# Patient Record
Sex: FEMALE | Race: WHITE | NOT HISPANIC OR LATINO | Employment: OTHER | URBAN - METROPOLITAN AREA
[De-identification: names, ages, dates, MRNs, and addresses within clinical notes are randomized per-mention and may not be internally consistent; named-entity substitution may affect disease eponyms.]

---

## 2017-01-11 ENCOUNTER — NURSE TRIAGE (OUTPATIENT)
Dept: ADMINISTRATIVE | Facility: CLINIC | Age: 62
End: 2017-01-11

## 2017-01-12 NOTE — TELEPHONE ENCOUNTER
Pt reports she has blockages in her carotid arteries: today at 1500 she was raising her arms to hang a curtain and had a sudden sharp pain in chest, brief, she belched and pain resolved. She then developed a sudden, sharp, brief pain in her forehead . She then had some dizziness and feels tired. Reported she has had intermittent dizziness since last week. Also reports a wet, productive cough, nose is dry. Afebrile. No sx's other than feeling tired at this time. She is concerned something broke off in her arteries and caused her sx's.   Reason for Disposition   Nursing judgment    Protocols used: ST NO GUIDELINE OR REFERENCE [id number]    Advised to f/u with her dr for her concerns. Call if any changes.

## 2017-01-16 ENCOUNTER — TELEPHONE (OUTPATIENT)
Dept: INTERNAL MEDICINE | Facility: CLINIC | Age: 62
End: 2017-01-16

## 2017-01-16 NOTE — TELEPHONE ENCOUNTER
----- Message from Jaja Santiago sent at 1/16/2017  7:51 AM CST -----  Contact: call  [phone]  Pt is asking for the nurse to call her,  She would not give any other information just that you know her and know why she is calling

## 2017-01-16 NOTE — TELEPHONE ENCOUNTER
Spoke with pt, she states she is been having dizzy spells offer pt an appt she stated she can come Wednesday or Thursday schedule pt an appt for this Wednesday 1/18/2017.

## 2017-01-16 NOTE — TELEPHONE ENCOUNTER
----- Message from Neisha Martinez sent at 1/16/2017 11:23 AM CST -----  Contact: self/[phone]  Pt called in regards to speaking to the nurse about her appointment that was scheduled.        Please advise

## 2017-01-19 ENCOUNTER — OFFICE VISIT (OUTPATIENT)
Dept: INTERNAL MEDICINE | Facility: CLINIC | Age: 62
End: 2017-01-19
Payer: COMMERCIAL

## 2017-01-19 VITALS
DIASTOLIC BLOOD PRESSURE: 72 MMHG | TEMPERATURE: 98 F | HEART RATE: 70 BPM | SYSTOLIC BLOOD PRESSURE: 110 MMHG | WEIGHT: 108.94 LBS | OXYGEN SATURATION: 98 % | HEIGHT: 62 IN | BODY MASS INDEX: 20.05 KG/M2

## 2017-01-19 DIAGNOSIS — I10 ESSENTIAL HYPERTENSION: Primary | ICD-10-CM

## 2017-01-19 DIAGNOSIS — R42 LIGHTHEADEDNESS: ICD-10-CM

## 2017-01-19 DIAGNOSIS — Z87.448 HISTORY OF HEMATURIA: ICD-10-CM

## 2017-01-19 LAB
BILIRUB UR QL STRIP: NEGATIVE
CLARITY UR REFRACT.AUTO: CLEAR
COLOR UR AUTO: NORMAL
GLUCOSE UR QL STRIP: NEGATIVE
HGB UR QL STRIP: NEGATIVE
KETONES UR QL STRIP: NEGATIVE
LEUKOCYTE ESTERASE UR QL STRIP: NEGATIVE
NITRITE UR QL STRIP: NEGATIVE
PH UR STRIP: 7 [PH] (ref 5–8)
PROT UR QL STRIP: NEGATIVE
SP GR UR STRIP: 1 (ref 1–1.03)
URN SPEC COLLECT METH UR: NORMAL
UROBILINOGEN UR STRIP-ACNC: NEGATIVE EU/DL

## 2017-01-19 PROCEDURE — 1159F MED LIST DOCD IN RCRD: CPT | Mod: S$GLB,,, | Performed by: INTERNAL MEDICINE

## 2017-01-19 PROCEDURE — 3078F DIAST BP <80 MM HG: CPT | Mod: S$GLB,,, | Performed by: INTERNAL MEDICINE

## 2017-01-19 PROCEDURE — 99999 PR PBB SHADOW E&M-EST. PATIENT-LVL IV: CPT | Mod: PBBFAC,,, | Performed by: INTERNAL MEDICINE

## 2017-01-19 PROCEDURE — 81003 URINALYSIS AUTO W/O SCOPE: CPT

## 2017-01-19 PROCEDURE — 3074F SYST BP LT 130 MM HG: CPT | Mod: S$GLB,,, | Performed by: INTERNAL MEDICINE

## 2017-01-19 PROCEDURE — 99214 OFFICE O/P EST MOD 30 MIN: CPT | Mod: S$GLB,,, | Performed by: INTERNAL MEDICINE

## 2017-01-19 RX ORDER — TIZANIDINE 4 MG/1
TABLET ORAL
Refills: 2 | COMMUNITY
Start: 2016-12-29 | End: 2018-04-17 | Stop reason: SDUPTHER

## 2017-01-19 NOTE — PROGRESS NOTES
Subjective:       Patient ID: Bhavna Landry is a 61 y.o. female.    Chief Complaint: Dizziness (x 2 weeks )    HPI   She got 133/73, we got 110/72.  Periodic lightheadedness.  Feels dizzy, but no vertigo.  May last 20 seconds.  Occurs sitting, standing.  Began about 10 days ago.    Had shooting pain in forehead, then pain in chest when arms extending above head hanging shower curtains.  Cp lasted 30 seconds.  She thought it was related to carotid art dz, so she called [facility], and thus comes in today.    Her machine was tested and is accurate.  BP has been similar at home.  Taking amlodipine 5 mg at home.    She has noted tiny spots of blood on front part of underwear, intermittent, since jennifer.  No hematuria.  She has had hysterectomy.  She refuses gyn eval.  No dysuria. Saw Dr Hobbs for gross hematuria.  CT urogram was nl as was cystoscope.  Review of Systems   Constitutional: Negative for fever and unexpected weight change.   HENT: Negative for congestion and postnasal drip.    Eyes: Negative for pain, discharge and visual disturbance.   Respiratory: Negative for cough, chest tightness, shortness of breath and wheezing.    Cardiovascular: Negative for chest pain and leg swelling.   Gastrointestinal: Negative for abdominal pain, constipation, diarrhea and nausea.   Genitourinary: Negative for difficulty urinating, dysuria and hematuria.   Skin: Negative for rash.   Neurological: Negative for headaches.   Psychiatric/Behavioral: Negative for dysphoric mood and sleep disturbance. The patient is not nervous/anxious.        Objective:      Physical Exam   Constitutional: She is oriented to person, place, and time. She appears well-developed and well-nourished.   Eyes: No scleral icterus.   Neck: No JVD present. No thyromegaly present.   Cardiovascular: Normal rate, regular rhythm and normal heart sounds.    Pulmonary/Chest: Effort normal and breath sounds normal. No respiratory distress. She has no wheezes. She has  no rales.   Abdominal: Soft. She exhibits no mass. There is no tenderness.   Musculoskeletal: She exhibits no edema.   Neurological: She is alert and oriented to person, place, and time.   Psychiatric: She has a normal mood and affect. Her behavior is normal.       122/70  119/65 by her machine.  130/70  Assessment:       1. Essential hypertension    2. Lightheadedness    3. History of hematuria      4.    Benign transient head and chest pain  Plan:       Bhavna was seen today for dizziness.    Diagnoses and all orders for this visit:    Essential hypertension    Lightheadedness    History of hematuria  -     Urinalysis       PE due in July

## 2017-01-19 NOTE — MR AVS SNAPSHOT
[facility] - Internal Medicine  [address]  [facility] [address]  Phone: [phone]  Fax: [phone]                  Bhavna Landry   2017 10:40 AM   Office Visit    Description:  Female : 1955   Provider:  Sadaf Vargas MD   Department:  [facility] Medicine           Reason for Visit     Dizziness           Diagnoses this Visit        Comments    Essential hypertension    -  Primary     Lightheadedness         History of hematuria                To Do List           Goals (5 Years of Data)     None      [facility]     [facility] -  Assistance  Registered nurses in the [facility] provide clinical advisement, health education, appointment booking, and other advisory services.  Call for this free service at [phone].             Medications           Message regarding Medications     Verify the changes and/or additions to your medication regime listed below are the same as discussed with your clinician today.  If any of these changes or additions are incorrect, please notify your healthcare provider.        STOP taking these medications     lorazepam (ATIVAN) 1 MG tablet Take 1 tablet (1 mg total) by mouth every evening.           Verify that the below list of medications is an accurate representation of the medications you are currently taking.  If none reported, the list may be blank. If incorrect, please contact your healthcare provider. Carry this list with you in case of emergency.           Current Medications     amlodipine (NORVASC) 5 MG tablet Take 1 tablet (5 mg total) by mouth once daily.    aspirin (ECOTRIN) 81 MG EC tablet Take 81 mg by mouth once daily.    atorvastatin (LIPITOR) 40 MG tablet Take 1 tablet (40 mg total) by mouth once daily.    benztropine (COGENTIN) 0.5 MG tablet Take 1 tablet (0.5 mg total) by mouth 2 (two) times daily.    butalbital-acetaminophen-caffeine -40 mg (FIORICET, ESGIC) -40  "mg per tablet     diclofenac (VOLTAREN) 75 MG EC tablet     mirtazapine (REMERON) 7.5 MG Tab Take 1 tablet (7.5 mg total) by mouth every evening.    multivitamin capsule Take 1 capsule by mouth once daily.    ropinirole (REQUIP) 3 MG tablet Take 1 tablet 2 hours before bedtime    thiothixene (NAVANE) 10 MG capsule Take 1 capsule (10 mg total) by mouth 3 (three) times daily.    tizanidine (ZANAFLEX) 4 MG tablet TK 1 T PO HS    topiramate (TOPAMAX) 25 MG tablet Take 75 mg by mouth 2 (two) times daily.    venlafaxine (EFFEXOR-XR) 150 MG Cp24 Take 1 capsule (150 mg total) by mouth once daily.    fluticasone (FLONASE) 50 mcg/actuation nasal spray 1 spray by Each Nare route once daily.           Clinical Reference Information           Vital Signs - Last Recorded  Most recent update: 1/19/2017 10:47 AM by Álvaro Duncan MA    BP Pulse Temp Ht Wt SpO2    110/72 (BP Location: Right arm, Patient Position: Sitting, BP Method: Manual) 70 98.4 °F (36.9 °C) (Oral) 5' 2" (1.575 m) 49.4 kg (108 lb 14.5 oz) 98%    BMI                19.92 kg/m2          Blood Pressure          Most Recent Value    BP  110/72      Allergies as of 1/19/2017     Adhesive    Chloromycetin [Chloramphenicol Sod Succinate]    Etanercept    Nickel Sutures [Surgical Stainless Steel]      Immunizations Administered on Date of Encounter - 1/19/2017     None      Orders Placed During Today's Visit      Normal Orders This Visit    Urinalysis       Smoking Cessation     If you would like to quit smoking:   You may be eligible for free services if you are a [place] resident and started smoking cigarettes before September 1, 1988.  Call the Smoking Cessation Trust (SCT) toll free at (254) 311-7781 or (768) 367-1831.   Call 3-800-QUIT-NOW if you do not meet the above criteria.            "

## 2017-01-24 ENCOUNTER — TELEPHONE (OUTPATIENT)
Dept: INTERNAL MEDICINE | Facility: CLINIC | Age: 62
End: 2017-01-24

## 2017-01-24 NOTE — TELEPHONE ENCOUNTER
----- Message from Mady Brennan sent at 1/24/2017  4:38 PM CST -----  Contact: pt [phone]  Patient would like to get test results.  Name of test (lab, mammo, etc.):  urine  Date of test:  1-19  Ordering provider: Alicia  Where was the test performed[facility]  Comments:

## 2017-01-25 ENCOUNTER — TELEPHONE (OUTPATIENT)
Dept: INTERNAL MEDICINE | Facility: CLINIC | Age: 62
End: 2017-01-25

## 2017-01-25 NOTE — TELEPHONE ENCOUNTER
----- Message from Ivan Limon sent at 1/25/2017  8:23 AM CST -----  Contact: Self/[phone] cell  Type: Test Results    What test was performed?Lab work    Who ordered the test?    When and where were the test performed? 01/19/2017 [facility]    Comments:Patient is calling to request test results. Please call and advise.    Thank you!

## 2017-02-27 DIAGNOSIS — E78.5 HYPERLIPIDEMIA: ICD-10-CM

## 2017-02-28 RX ORDER — ATORVASTATIN CALCIUM 40 MG/1
TABLET, FILM COATED ORAL
Qty: 30 TABLET | Refills: 11 | Status: SHIPPED | OUTPATIENT
Start: 2017-02-28 | End: 2018-01-15 | Stop reason: SDUPTHER

## 2017-03-15 ENCOUNTER — TELEPHONE (OUTPATIENT)
Dept: PSYCHIATRY | Facility: CLINIC | Age: 62
End: 2017-03-15

## 2017-03-15 NOTE — TELEPHONE ENCOUNTER
See below. Patient requested letter for jury duty. Dr. Stafford (prior psychiatrist) has provided patient with letter in the past simply stating patient's name, diagnosis, and that she is treated with antipsychotics. Patient gave verbal permission for this information being documented in letter to court. Will writer letter and leave at  for patient to  3/16/16 - anytime after noon.    -Armando Coleman,   PGY-3  Pager: [phone]        ----- Message from Martha Cosby sent at 3/14/2017  8:15 AM CDT -----  Contact: pt   Pt calling stating she needs a   Excuse Letter for Moise Kincaid  Jury Duty that  she received on yesterday.   Her cb #  [phone]

## 2017-04-17 ENCOUNTER — TELEPHONE (OUTPATIENT)
Dept: INTERNAL MEDICINE | Facility: CLINIC | Age: 62
End: 2017-04-17

## 2017-04-17 NOTE — TELEPHONE ENCOUNTER
Spoke with pt, x 4 days white phlegm, otc mucinex, headache 7/10. No fever.Pt stated she is clogged up.

## 2017-04-17 NOTE — TELEPHONE ENCOUNTER
Sinus symptoms sound viral.  Call if no better in a few days    She prefers to call neurologist about frontal headache.

## 2017-04-17 NOTE — TELEPHONE ENCOUNTER
----- Message from Stephani Ferrer sent at 4/13/2017  4:35 PM CDT -----  Contact: Patient  Patient would like to get medical advice.  Symptoms (please be specific): sinus headache and chest congestion, coughing up phlegm  How long has patient had these symptoms:    Pharmacy name and phone #:  [id number] - [place] - [phone] AT [facility] [address] [place] [address] [phone] (Phone)  [phone] (Fax)  Any drug allergies:  Please see chart.   Comments: The patient says she has tried multiple OTCs without any relief.  Please call her at [phone].    Thanks!

## 2017-04-27 PROCEDURE — 99283 EMERGENCY DEPT VISIT LOW MDM: CPT

## 2017-04-27 PROCEDURE — 99284 EMERGENCY DEPT VISIT MOD MDM: CPT | Mod: ,,, | Performed by: EMERGENCY MEDICINE

## 2017-04-27 PROCEDURE — 96372 THER/PROPH/DIAG INJ SC/IM: CPT | Mod: 25

## 2017-04-28 ENCOUNTER — HOSPITAL ENCOUNTER (EMERGENCY)
Facility: HOSPITAL | Age: 62
Discharge: HOME | End: 2017-04-28
Attending: EMERGENCY MEDICINE | Admitting: EMERGENCY MEDICINE
Payer: COMMERCIAL

## 2017-04-28 VITALS
RESPIRATION RATE: 18 BRPM | OXYGEN SATURATION: 100 % | SYSTOLIC BLOOD PRESSURE: 144 MMHG | HEART RATE: 66 BPM | DIASTOLIC BLOOD PRESSURE: 65 MMHG | BODY MASS INDEX: 20.24 KG/M2 | WEIGHT: 110 LBS | TEMPERATURE: 98 F | HEIGHT: 62 IN

## 2017-04-28 DIAGNOSIS — G43.909 MIGRAINE WITHOUT STATUS MIGRAINOSUS, NOT INTRACTABLE, UNSPECIFIED MIGRAINE TYPE: ICD-10-CM

## 2017-04-28 DIAGNOSIS — R51.9 HEADACHE, UNSPECIFIED HEADACHE TYPE: Primary | ICD-10-CM

## 2017-04-28 PROCEDURE — 63600175 PHARM REV CODE 636 W HCPCS: Performed by: EMERGENCY MEDICINE

## 2017-04-28 RX ORDER — DIPHENHYDRAMINE HYDROCHLORIDE 50 MG/ML
25 INJECTION INTRAMUSCULAR; INTRAVENOUS
Status: COMPLETED | OUTPATIENT
Start: 2017-04-28 | End: 2017-04-28

## 2017-04-28 RX ORDER — METOCLOPRAMIDE HYDROCHLORIDE 5 MG/ML
10 INJECTION INTRAMUSCULAR; INTRAVENOUS
Status: COMPLETED | OUTPATIENT
Start: 2017-04-28 | End: 2017-04-28

## 2017-04-28 RX ADMIN — DEXAMETHASONE 10 MG: 4 TABLET ORAL at 02:04

## 2017-04-28 RX ADMIN — DIPHENHYDRAMINE HYDROCHLORIDE 25 MG: 50 INJECTION, SOLUTION INTRAMUSCULAR; INTRAVENOUS at 01:04

## 2017-04-28 RX ADMIN — METOCLOPRAMIDE 10 MG: 5 INJECTION, SOLUTION INTRAMUSCULAR; INTRAVENOUS at 01:04

## 2017-04-28 NOTE — ED AVS SNAPSHOT
[facility]  [address] [facility] [address]               Bhavna Landry   2017 12:13 AM   ED    Description:  Female : 1955   Department:  [facility]           Your Care was Coordinated By:     Provider Role From To    Mark Elizabeth MD Attending Provider 17 --    Rosendo Doran MD Resident 17 --      Reason for Visit     Headache           Diagnoses this Visit        Comments    Headache, unspecified headache type    -  Primary     Migraine without status migrainosus, not intractable, unspecified migraine type           ED Disposition     ED Disposition Condition Comment    Discharge             To Do List           Follow-up Information     Follow up with Sadaf Vargas MD.    Specialty:  Internal Medicine    Contact information:    [address]  [place] [address]  [phone]          Schedule an appointment as soon as possible for a visit with Your Neurologist.        Follow up with Sadaf Vargas MD In 1 week(s).    Specialty:  Internal Medicine    Contact information:    [address]  [place] [address]  [phone]        [facility]     [facility] On Call Nurse Care Line -  Assistance  Unless otherwise directed by your provider, please contact [facility], our nurse care line that is available for  assistance.     Registered nurses in the [facility] provide: appointment scheduling, clinical advisement, health education, and other advisory services.  Call: [phone] (toll free)               Medications           Message regarding Medications     Verify the changes and/or additions to your medication regime listed below are the same as discussed with your clinician today.  If any of these changes or additions are incorrect, please notify your healthcare provider.        These medications were administered today        Dose Freq    metoclopramide  HCl injection 10 mg 10 mg ED 1 Time    Sig: Inject 2 mLs (10 mg total) into the muscle ED 1 Time.    Class: Normal    Route: Intramuscular    diphenhydrAMINE injection 25 mg 25 mg ED 1 Time    Sig: Inject 0.5 mLs (25 mg total) into the muscle ED 1 Time.    Class: Normal    Route: Intramuscular    dexamethasone tablet 10 mg 10 mg ED 1 Time    Sig: Take 10 mg by mouth ED 1 Time.    Class: Normal    Route: Oral           Verify that the below list of medications is an accurate representation of the medications you are currently taking.  If none reported, the list may be blank. If incorrect, please contact your healthcare provider. Carry this list with you in case of emergency.           Current Medications     amlodipine (NORVASC) 5 MG tablet Take 1 tablet (5 mg total) by mouth once daily.    aspirin (ECOTRIN) 81 MG EC tablet Take 81 mg by mouth once daily.    atorvastatin (LIPITOR) 40 MG tablet TAKE 1 TABLET BY MOUTH DAILY    benztropine (COGENTIN) 0.5 MG tablet Take 1 tablet (0.5 mg total) by mouth 2 (two) times daily.    butalbital-acetaminophen-caffeine -40 mg (FIORICET, ESGIC) -40 mg per tablet     diclofenac (VOLTAREN) 75 MG EC tablet     fluticasone (FLONASE) 50 mcg/actuation nasal spray 1 spray by Each Nare route once daily.    mirtazapine (REMERON) 7.5 MG Tab Take 1 tablet (7.5 mg total) by mouth every evening.    multivitamin capsule Take 1 capsule by mouth once daily.    ropinirole (REQUIP) 3 MG tablet Take 1 tablet 2 hours before bedtime    thiothixene (NAVANE) 10 MG capsule Take 1 capsule (10 mg total) by mouth 3 (three) times daily.    tizanidine (ZANAFLEX) 4 MG tablet TK 1 T PO HS    topiramate (TOPAMAX) 25 MG tablet Take 75 mg by mouth 2 (two) times daily.    venlafaxine (EFFEXOR-XR) 150 MG Cp24 Take 1 capsule (150 mg total) by mouth once daily.           Clinical Reference Information           Your Vitals Were     BP Pulse Temp Resp Height Weight    144/65 (BP Location: Right arm, Patient  "Position: Sitting) 66 97.9 °F (36.6 °C) (Oral) 18 5' 2" (1.575 m) 49.9 kg (110 lb)    SpO2 BMI             100% 20.12 kg/m2         Allergies as of 4/28/2017        Reactions    Adhesive     Other reaction(s): Rash    Chloromycetin [Chloramphenicol Sod Succinate] Hives    Etanercept     Other reaction(s): recurrent infections    Nickel Sutures [Surgical Stainless Steel]     Allergic contact dermatitis      Immunizations Administered on Date of Encounter - 4/28/2017     None      ED Micro, Lab, POCT     None      ED Imaging Orders     Start Ordered       Status Ordering Provider    04/28/17 0141 04/28/17 0140  CT Head Without Contrast  1 time imaging      Final result         Discharge Instructions         Self-Care for Headaches  Most headaches aren't serious and can be relieved with self-care. But some headaches may be a sign of another health problem like eye trouble or high blood pressure. To find the best treatment, learn what kind of headaches you get. For tension headaches, self-care will usually help. To treat migraines, ask your healthcare provider for advice. It is also possible to get both tension and migraine headaches. Self-care involves relieving the pain and avoiding headache triggers if you can.    Ways to reduce pain and tension  Try these steps:  · Apply a cold compress or ice pack to the pain site.  · Drink fluids. If nausea makes it hard to drink, try sucking on ice.  · Rest. Protect yourself from bright light and loud noises.  · Calm your emotions by imagining a peaceful scene.  · Massage tight neck, shoulder, and head muscles.  · To relax muscles, soak in a hot bath or use a hot shower.  Use medicines  Aspirin or aspirin substitutes, such as ibuprofen and acetaminophen, can relieve headache. Remember: Never give aspirin to anyone 18 years old or younger because of the risk of developing Reye syndrome. Use pain medicines only when necessary.  Track your headaches  Keeping a headache diary can " "help you and your healthcare provider identify what's causing your headaches:  · Note when each headache happens.  · Identify your activities and the foods you've eaten 6 to 8 hours before the headache began.  · Look for any trends or "triggers."  Signs of tension headache  Any of the following can be signs:  · Dull pain or feeling of pressure in a tight band around your head  · Pain in your neck or shoulders  · Headache without a definite beginning or end  · Headache after an activity such as driving or working on a computer  Signs of migraine  Any of the following can be signs:  · Throbbing pain on one or both sides of your head  · Nausea or vomiting  · Extreme sensitivity to light, sound, and smells  · Bright spots, flashes, or other visual changes  · Pain or nausea so severe that you can't continue your daily activities  Call your healthcare provider   If you have any of the following symptoms, contact your healthcare provider:  · A headache that lingers after a recent injury or bump to the head.  · A fever with a stiff neck or pain when you bend your head toward your chest.  · A headache along with slurred speech, changes in your vision, or numbness or weakness in your arms or legs.  · A headache for longer than 3 days.  · Frequent headaches, especially in the morning.  · Headaches with seizures   · Seek immediate medical attention if you have a headache that you would call "the worst headache you have ever had."   Date Last Reviewed: 10/4/2015  © 4221-0533 [employer]. [address] [place], [place] [address]. All rights reserved. This information is not intended as a substitute for professional medical care. Always follow your healthcare professional's instructions.          Preventing Migraine Headaches: Medicines and Lifestyle Changes  A migraine is a type of severe headache. Having a migraine can be very painful. But there are steps you can take to help prevent migraines.    Medicines to " help prevent migraines  · Your healthcare provider may prescribe certain medicines to help prevent migraines. These medicines may need to be taken daily. Or they may only need to be taken at times when youre likely to have a migraine.  · Common medicines used to help prevent migraines include:  ¨ Triptans (serotonin receptor agonists)  ¨ Nonsteroidal anti-inflammatory drugs (available over-the-counter)  ¨ Beta-blockers  ¨ Anticonvulsants  ¨ Tricyclic antidepressants  ¨ Calcium channel blockers  ¨ Certain vitamins, minerals, and plant extracts  ¨ Botulinum toxin injection (Botox) for certain chronic migraines   ¨ CGRP (calcitonin gene-related peptide) agnonists are being reviewed by the Food and Drug Administration (FDA)  Lifestyle changes for long-term prevention  Here are some suggestions:  · Exercise. Regular exercise can help prevent migraines and improve your health. (If exercise triggers your migraines, talk to your healthcare provider.)  · Keep regular habits. Dont skip or delay meals. Drink plenty of water. And go to bed and get up at about the same time each day. This includes weekends.  · Try alternative treatments. These are treatments that do not involve the use of medicines or surgery. They may help relieve symptoms and prevent migraines. Some treatment options include biofeedback and acupuncture. Ask your healthcare provider to tell you more about these treatments if you have questions.  · Limit caffeine. You may find that caffeine helps relieve pain during an attack. But too much caffeine can also trigger migraines. So, limit the amount of caffeine you consume.  Date Last Reviewed: 10/11/2015  © 2876-7657 [employer]. [address] [place] [address], [place] [address]. All rights reserved. This information is not intended as a substitute for professional medical care. Always follow your healthcare professional's instructions.          Preventing Migraine Headaches: Triggers  The first step in  preventing migraines is to learn what triggers them. You may then be able to control your triggers to avoid or reduce the severity of your migraines.     Know your triggers  Be aware that you may have more than one trigger, and that some triggers may work together. Common migraine triggers include:  · Food and nutrition. Skipping meals or not drinking enough water can trigger headaches. So can certain foods, such as caffeine, monosodium glutamate (MSG), aged cheese, or sausage.  · Alcohol. Red wine and other alcoholic beverages are common migraine triggers.  · Chemicals. Scents, cleaning products, gasoline, glue, perfume, and paint can be triggers. So can tobacco smoke, including secondhand smoke.  · Emotions. Stress can trigger headaches or make them worse once they begin.  · Sleep disruption. Staying up late, sleeping late, and traveling across time zones can disrupt your sleep cycle, triggering headaches.  · Hormones. Many women notice that migraines tend to happen at a certain point in their menstrual cycle. Birth control pills or hormone replacement therapy may also trigger migraines.  · Environment and weather. Air travel, changes in altitude, air pressure changes, hot sun, or bright or flashing lights can be triggers.    Control your triggers  These are some of the things you can do to try to control triggers:  · Avoid triggers if you can. For example, stay clear of alcohol and foods that trigger your headaches. Use unscented household products. Keep regular sleep habits. Manage stress to help control emotional triggers.  · Change your behavior at times when triggers can't be avoided. For example, make sure to get enough rest and drink plenty of water while you're traveling. Make sure to carry a hat, sunglasses, and your medicines. Be alert for migraine symptoms, so you can treat a migraine early if it happens.  Date Last Reviewed: 10/9/2015  © 3521-5373 [employer]. [address],  [place] [address]. All rights reserved. This information is not intended as a substitute for professional medical care. Always follow your healthcare professional's instructions.          Smoking Cessation     If you would like to quit smoking:   You may be eligible for free services if you are a [place] resident and started smoking cigarettes before September 1, 1988.  Call the Smoking Cessation Trust (SCT) toll free at (863) 039-6631 or (986) 945-1700.   Call 1-800-QUIT-NOW if you do not meet the above criteria.   Contact us via email: [email or web address]   View our website for more information: [email or web address]         [facility] complies with applicable Federal civil rights laws and does not discriminate on the basis of race, color, national origin, age, disability, or sex.        Language Assistance Services     ATTENTION: Language assistance services are available, free of charge. Please call [phone].      ATENCIÓN: Si habla español, tiene a tobias disposición servicios gratuitos de asistencia lingüística. Llame al [phone].     CHÚ Ý: N?u b?n nói Ti?ng Vi?t, có các d?ch v? h? tr? ngôn ng? mi?n phí dành cho b?n. G?i s? [phone].

## 2017-04-28 NOTE — DISCHARGE INSTRUCTIONS
"  Self-Care for Headaches  Most headaches aren't serious and can be relieved with self-care. But some headaches may be a sign of another health problem like eye trouble or high blood pressure. To find the best treatment, learn what kind of headaches you get. For tension headaches, self-care will usually help. To treat migraines, ask your healthcare provider for advice. It is also possible to get both tension and migraine headaches. Self-care involves relieving the pain and avoiding headache triggers if you can.    Ways to reduce pain and tension  Try these steps:  · Apply a cold compress or ice pack to the pain site.  · Drink fluids. If nausea makes it hard to drink, try sucking on ice.  · Rest. Protect yourself from bright light and loud noises.  · Calm your emotions by imagining a peaceful scene.  · Massage tight neck, shoulder, and head muscles.  · To relax muscles, soak in a hot bath or use a hot shower.  Use medicines  Aspirin or aspirin substitutes, such as ibuprofen and acetaminophen, can relieve headache. Remember: Never give aspirin to anyone 18 years old or younger because of the risk of developing Reye syndrome. Use pain medicines only when necessary.  Track your headaches  Keeping a headache diary can help you and your healthcare provider identify what's causing your headaches:  · Note when each headache happens.  · Identify your activities and the foods you've eaten 6 to 8 hours before the headache began.  · Look for any trends or "triggers."  Signs of tension headache  Any of the following can be signs:  · Dull pain or feeling of pressure in a tight band around your head  · Pain in your neck or shoulders  · Headache without a definite beginning or end  · Headache after an activity such as driving or working on a computer  Signs of migraine  Any of the following can be signs:  · Throbbing pain on one or both sides of your head  · Nausea or vomiting  · Extreme sensitivity to light, sound, and " "smells  · Bright spots, flashes, or other visual changes  · Pain or nausea so severe that you can't continue your daily activities  Call your healthcare provider   If you have any of the following symptoms, contact your healthcare provider:  · A headache that lingers after a recent injury or bump to the head.  · A fever with a stiff neck or pain when you bend your head toward your chest.  · A headache along with slurred speech, changes in your vision, or numbness or weakness in your arms or legs.  · A headache for longer than 3 days.  · Frequent headaches, especially in the morning.  · Headaches with seizures   · Seek immediate medical attention if you have a headache that you would call "the worst headache you have ever had."   Date Last Reviewed: 10/4/2015  © 7953-1475 [employer]. [address] [place] [address], [place] [address]. All rights reserved. This information is not intended as a substitute for professional medical care. Always follow your healthcare professional's instructions.          Preventing Migraine Headaches: Medicines and Lifestyle Changes  A migraine is a type of severe headache. Having a migraine can be very painful. But there are steps you can take to help prevent migraines.    Medicines to help prevent migraines  · Your healthcare provider may prescribe certain medicines to help prevent migraines. These medicines may need to be taken daily. Or they may only need to be taken at times when youre likely to have a migraine.  · Common medicines used to help prevent migraines include:  ¨ Triptans (serotonin receptor agonists)  ¨ Nonsteroidal anti-inflammatory drugs (available over-the-counter)  ¨ Beta-blockers  ¨ Anticonvulsants  ¨ Tricyclic antidepressants  ¨ Calcium channel blockers  ¨ Certain vitamins, minerals, and plant extracts  ¨ Botulinum toxin injection (Botox) for certain chronic migraines   ¨ CGRP (calcitonin gene-related peptide) agnonists are being reviewed by the Food and " Drug Administration (FDA)  Lifestyle changes for long-term prevention  Here are some suggestions:  · Exercise. Regular exercise can help prevent migraines and improve your health. (If exercise triggers your migraines, talk to your healthcare provider.)  · Keep regular habits. Dont skip or delay meals. Drink plenty of water. And go to bed and get up at about the same time each day. This includes weekends.  · Try alternative treatments. These are treatments that do not involve the use of medicines or surgery. They may help relieve symptoms and prevent migraines. Some treatment options include biofeedback and acupuncture. Ask your healthcare provider to tell you more about these treatments if you have questions.  · Limit caffeine. You may find that caffeine helps relieve pain during an attack. But too much caffeine can also trigger migraines. So, limit the amount of caffeine you consume.  Date Last Reviewed: 10/11/2015  © 9739-6975 [employer]. [address] [place] [address]. All rights reserved. This information is not intended as a substitute for professional medical care. Always follow your healthcare professional's instructions.          Preventing Migraine Headaches: Triggers  The first step in preventing migraines is to learn what triggers them. You may then be able to control your triggers to avoid or reduce the severity of your migraines.     Know your triggers  Be aware that you may have more than one trigger, and that some triggers may work together. Common migraine triggers include:  · Food and nutrition. Skipping meals or not drinking enough water can trigger headaches. So can certain foods, such as caffeine, monosodium glutamate (MSG), aged cheese, or sausage.  · Alcohol. Red wine and other alcoholic beverages are common migraine triggers.  · Chemicals. Scents, cleaning products, gasoline, glue, perfume, and paint can be triggers. So can tobacco smoke, including secondhand  smoke.  · Emotions. Stress can trigger headaches or make them worse once they begin.  · Sleep disruption. Staying up late, sleeping late, and traveling across time zones can disrupt your sleep cycle, triggering headaches.  · Hormones. Many women notice that migraines tend to happen at a certain point in their menstrual cycle. Birth control pills or hormone replacement therapy may also trigger migraines.  · Environment and weather. Air travel, changes in altitude, air pressure changes, hot sun, or bright or flashing lights can be triggers.    Control your triggers  These are some of the things you can do to try to control triggers:  · Avoid triggers if you can. For example, stay clear of alcohol and foods that trigger your headaches. Use unscented household products. Keep regular sleep habits. Manage stress to help control emotional triggers.  · Change your behavior at times when triggers can't be avoided. For example, make sure to get enough rest and drink plenty of water while you're traveling. Make sure to carry a hat, sunglasses, and your medicines. Be alert for migraine symptoms, so you can treat a migraine early if it happens.  Date Last Reviewed: 10/9/2015  © 1292-4228 [employer]. [address] [place] [address], [place] [address]. All rights reserved. This information is not intended as a substitute for professional medical care. Always follow your healthcare professional's instructions.

## 2017-04-28 NOTE — ED NOTES
Pt assisted to bed. Pt identifiers verified.    APPEARANCE: Pt is awake and alert. Pt is clean and well groomed with clothes appropriately fastened.   RESPIRATORY: Respirations are even and unlabored. Airway is patent.   SKIN: Skin is warm, dry, intact and appropriately colored for ethnicity.   NEURO: Pt is moving all extremities without difficulty. Sensation is intact. Speech is clear and appropriate. Facial movement is symmetrical.   CARDIAC: No edema noted. Peripheral pulses are intact and palpable. Cap refill is <3 seconds.     Bed is in low, locked position with side rails upx2. Call light is in reach.

## 2017-04-28 NOTE — ED PROVIDER NOTES
Encounter Date: 2017    SCRIBE #1 NOTE: I, Carlos Alberto Gee, am scribing for, and in the presence of,  Mark Elizabeth MD. I have scribed the following portions of the note - the Resident attestation.       History     Chief Complaint   Patient presents with    Headache     Patient reports a migraine x 3 weeks. Patient also reports right eye swelling     Review of patient's allergies indicates:   Allergen Reactions    Adhesive      Other reaction(s): Rash    Chloromycetin [chloramphenicol sod succinate] Hives    Etanercept      Other reaction(s): recurrent infections    Nickel sutures [surgical stainless steel]      Allergic contact dermatitis     HPI Comments: 62 yo female pmhx schizophrenia, HTN, migraine headaches presenting with three weeks unilateral , throbbing headache associated with photophobia, phonophobia, nausea. Reports this is her typical migraine symptoms however they usually don't last this long. She has been taking Fioricet and hydrocodone-acetaminophen at home which relieves pain temporarily. Pain is constant. She sees a neurologist outside the [facility] system for her migraines. Typically gets them once per year.    The history is provided by the patient.     Past Medical History:   Diagnosis Date    Allergy     Amblyopia     Anemia     Arthritis 1992    Cataract     Depression     Dry eyes     Dry mouth     Fibromyalgia 2014    Fibromyalgia     GERD (gastroesophageal reflux disease)     History of psychiatric hospitalization     Hyperlipidemia 1992    Hypertension     Kidney stone     Migraine headache     Osteoporosis     Psoriatic arthritis 1992    Right knee pain     post knee replacement surgery (possible rejectiion of metal)    RLS (restless legs syndrome)     Schizophrenia 1992    stable on meds    Urinary tract infection      Past Surgical History:   Procedure Laterality Date     SECTION      COLONOSCOPY N/A 2016     Procedure: COLONOSCOPY;  Surgeon: ANDRIA Connelly MD;  Location: [facility] ([address] [place] [address]);  Service: Endoscopy;  Laterality: N/A;    cyst removed from right sinus  07/09/1982    HYSTERECTOMY      VAGINAL HYSTERECTOMY WITHOUT BSO - ENDOMETRIOSIS    JOINT REPLACEMENT Right     knee    KNEE SURGERY      Surgery on right knee  07/09/1982    tumor removed from back left side upper shoulder  03/17/2006     Family History   Problem Relation Age of Onset    Colon cancer Mother     Psoriasis Mother     Cancer Mother      colon    Depression Mother     Diabetes Brother     Arthritis Brother     Heart disease Brother      cad    Cancer Father      lymphoma    Stroke Sister     No Known Problems Daughter     Hypertension Neg Hx     Suicide Neg Hx     Amblyopia Neg Hx     Blindness Neg Hx     Cataracts Neg Hx     Glaucoma Neg Hx     Macular degeneration Neg Hx     Retinal detachment Neg Hx     Strabismus Neg Hx      Social History   Substance Use Topics    Smoking status: Current Every Day Smoker     Packs/day: 0.50     Years: 10.00     Types: Cigarettes     Last attempt to quit: 5/19/2012    Smokeless tobacco: Former User    Alcohol use No     Review of Systems   Constitutional: Negative for activity change, appetite change, chills and fever.   HENT: Negative for congestion, ear pain, facial swelling, rhinorrhea, sinus pressure, trouble swallowing and voice change.    Eyes: Positive for photophobia. Negative for visual disturbance.   Respiratory: Negative for cough, shortness of breath and wheezing.    Cardiovascular: Negative for chest pain and palpitations.   Gastrointestinal: Positive for nausea. Negative for abdominal pain, diarrhea and vomiting.   Endocrine: Negative for polydipsia, polyphagia and polyuria.   Genitourinary: Negative for flank pain, frequency and urgency.   Musculoskeletal: Negative for joint swelling, neck pain and neck stiffness.   Skin: Negative for color change, pallor, rash and  wound.   Neurological: Positive for facial asymmetry (chronic) and headaches. Negative for speech difficulty, weakness and numbness.   Psychiatric/Behavioral: Negative for confusion.   All other systems reviewed and are negative.      Physical Exam   Initial Vitals   BP Pulse Resp Temp SpO2   04/27/17 2203 04/27/17 2203 04/27/17 2203 04/27/17 2203 04/27/17 2203   144/65 66 18 97.9 °F (36.6 °C) 100 %     Physical Exam    Constitutional: She appears well-developed and well-nourished. She is not diaphoretic. No distress.   HENT:   Head: Normocephalic and atraumatic.   Nose: Nose normal.   Mouth/Throat: No oropharyngeal exudate.   Eyes: Pupils are equal, round, and reactive to light. Right eye exhibits abnormal extraocular motion (Unable to abduct(chronic)). Left eye exhibits normal extraocular motion.   Neck: Normal range of motion. Neck supple.   Cardiovascular: Normal rate, regular rhythm, normal heart sounds and intact distal pulses. Exam reveals no gallop and no friction rub.    No murmur heard.  Pulmonary/Chest: Breath sounds normal. No respiratory distress. She has no wheezes. She has no rales.   Abdominal: Soft. Bowel sounds are normal. She exhibits no distension. There is no tenderness. There is no rebound.   Musculoskeletal: Normal range of motion. She exhibits no edema.   Neurological: She is alert and oriented to person, place, and time. She has normal strength. No cranial nerve deficit or sensory deficit. She displays a negative Romberg sign. Coordination normal.   Rt sided facial droop(chronic)   Skin: Skin is warm and dry.   Psychiatric: She has a normal mood and affect. Her behavior is normal. Judgment and thought content normal.         ED Course   Procedures  Labs Reviewed - No data to display     Imaging Results         CT Head Without Contrast (Final result) Result time:  04/28/17 02:33:05    Final result by Dale Hernandez MD (04/28/17 02:33:05)    Impression:       No acute intracranial  process.              Electronically signed by: SUZY SAMANO MD  Date:     04/28/17  Time:    02:33     Narrative:    Exam: [id number]  04/28/17  02:20:00 [id number] ([facility]) : CT HEAD WITHOUT CONTRAST    Technique:    Axial CT scan of the head was obtained from the vertex to the skull base without intravenous contrast. Coronal and Sagittal reformats were obtained.     Comparison:    2/14/14    Findings:      The subcutaneous tissues are within normal limits.  The bony calvarium is intact.  The paranasal sinuses and the mastoid air cells are clear.  The orbits and intraorbital contents are within normal limits.    There are no extra-axial fluid collections.  There is no evidence of intracranial hemorrhage.  The ventricles and sulci are within normal limits.  The gray-white differentiation is maintained.  There is no evidence of midline shift.  There is no evidence of mass effect.                 Medical Decision Making:   History:   Old Medical Records: I decided to obtain old medical records.  Initial Assessment:   62 yo female pmhx schizophrenia, HTN, migraine presenting with unilateral, throbbing headache with photophobia, phonophobia, nausea. Will try and break headache cycle with metoclopramide and benadryl with decadron to decrease recurrence. No new neurologic findings.  Differential Diagnosis:   Migraine headache vs tension headache vs medication overuse headache  Clinical Tests:   Radiological Study: Ordered and Reviewed            Scribe Attestation:   Scribe #1: I performed the above scribed service and the documentation accurately describes the services I performed. I attest to the accuracy of the note.    Attending Attestation:   Physician Attestation Statement for Resident:  As the supervising MD   Physician Attestation Statement: I have personally seen and examined this patient.   I agree with the above history. -: Pt presents with right sided frontal headache that feels like her prior migraine headaches. It  has been off an on for the last 3 weeks. I considered stroke, brain bleed, brain tumor, migraine headache, tension headache  among other diagnosis. Plan to give migraine cocktail, obtain Head CT scan and will re-evaluate.     Head CT neg.    HA improved with Migraine cocktail.    The patient was given strict return precautions and follow up instructions and expressed understanding of these.  The patient felt comfortable with the plan for discharge and I did as well.  Stable for DC.       As the supervising MD I agree with the above PE.    As the supervising MD I agree with the above treatment, course, plan, and disposition.  I have reviewed and agree with the residents interpretation of the following: CT scans.          Physician Attestation for Scribe:  Physician Attestation Statement for Scribe #1: I, Mark Elizabeth MD, reviewed documentation, as scribed by Carlos Alberto Gee in my presence, and it is both accurate and complete.                 ED Course     Clinical Impression:   The primary encounter diagnosis was Headache, unspecified headache type. A diagnosis of Migraine without status migrainosus, not intractable, unspecified migraine type was also pertinent to this visit.          Mark Elizabeth MD  04/28/17 0646

## 2017-05-10 ENCOUNTER — TELEPHONE (OUTPATIENT)
Dept: INTERNAL MEDICINE | Facility: CLINIC | Age: 62
End: 2017-05-10

## 2017-05-10 NOTE — TELEPHONE ENCOUNTER
Spoke with pt, informed her that the pressure is normal and if she has any concerns she can schedule an appt.

## 2017-05-10 NOTE — TELEPHONE ENCOUNTER
----- Message from Stephani Ferrer sent at 5/10/2017 11:08 AM CDT -----  Contact: Patient, phone [phone]  The patient says she is low on energy today and her blood pressure is 121/70.  Please call and advise.     Thanks!

## 2017-05-10 NOTE — TELEPHONE ENCOUNTER
pls call - that BP is perfect.    Does she have any specific complaints beyond fatigue?    pls offer appt.

## 2017-05-15 ENCOUNTER — TELEPHONE (OUTPATIENT)
Dept: INTERNAL MEDICINE | Facility: CLINIC | Age: 62
End: 2017-05-15

## 2017-05-15 RX ORDER — ALBUTEROL SULFATE 90 UG/1
2 AEROSOL, METERED RESPIRATORY (INHALATION) EVERY 6 HOURS PRN
Qty: 1 EACH | Refills: 2 | Status: SHIPPED | OUTPATIENT
Start: 2017-05-15 | End: 2017-09-14

## 2017-05-15 RX ORDER — PREDNISONE 20 MG/1
TABLET ORAL
Qty: 10 TABLET | Refills: 0 | Status: SHIPPED | OUTPATIENT
Start: 2017-05-15 | End: 2017-09-14

## 2017-05-15 NOTE — TELEPHONE ENCOUNTER
pls call -     symptoms sound viral so I don't think she needs abx    Is she using inhaler?   If wheezing a lot I can try prednisone.  Let me know if temp goes above 101. Or if she doesn't feel better soon  She may take tylenol for fever.    So use albuterol inhaler and Ill call in prednisone if she agrees 20 mg  2 tabs daily for 5 days

## 2017-05-15 NOTE — TELEPHONE ENCOUNTER
Spoke with pt in regards to below listed message. Pt stated that she is in agreement with taking steroid and using inhaler. Pt stated that she does not currently have an inhaler and would like for you to send in a prescription for one.

## 2017-05-15 NOTE — TELEPHONE ENCOUNTER
Pt c/o sore throat, lots of coughing and wheezing x 3 days. She stated that she currently has a temp of 100.3. Pt has been taking Aleve and Coricidin for symptoms. Pt states that she is unable to come in for evaluation due to the cost of the visit. She stated that if she has to pay for her visit, that'll take away the funds to purchase medication.  Please advise. Thanks

## 2017-05-15 NOTE — TELEPHONE ENCOUNTER
----- Message from Neisha Taylor sent at 5/15/2017  8:11 AM CDT -----  Contact: patient [phone]  Pt called with c/o bronchitis flare up/wheezing/ productive cough and swollen glands on neck. Pt can't come in for an appt because she has her grandchildren and they are sick too. Pt is requesting meds . She cannot lay flat without wheezing. Pt has not been able to sleep x 3 nights. Please call pt asap.

## 2017-06-21 ENCOUNTER — TELEPHONE (OUTPATIENT)
Dept: INTERNAL MEDICINE | Facility: CLINIC | Age: 62
End: 2017-06-21

## 2017-06-21 RX ORDER — DOXYCYCLINE 100 MG/1
100 CAPSULE ORAL 2 TIMES DAILY
Qty: 20 CAPSULE | Refills: 0 | Status: SHIPPED | OUTPATIENT
Start: 2017-06-21 | End: 2017-09-14 | Stop reason: SDUPTHER

## 2017-06-21 NOTE — TELEPHONE ENCOUNTER
Be sure to use albuterol inhaler 2-3 times a day if wheezing.    rx was sent last m [facility]    appt if no betteer

## 2017-06-21 NOTE — TELEPHONE ENCOUNTER
----- Message from Annabel Naik sent at 6/21/2017  8:09 AM CDT -----  Contact: Self/[phone]  Pt said that she is calling in regards to she has been coughing up phlem again pt stated that she was taking Prednisone for this and it went away but the cough has returned. Please call and advise              Thank you

## 2017-07-07 ENCOUNTER — TELEPHONE (OUTPATIENT)
Dept: INTERNAL MEDICINE | Facility: CLINIC | Age: 62
End: 2017-07-07

## 2017-07-07 DIAGNOSIS — Z00.00 ANNUAL PHYSICAL EXAM: Primary | ICD-10-CM

## 2017-07-09 ENCOUNTER — HOSPITAL ENCOUNTER (EMERGENCY)
Facility: HOSPITAL | Age: 62
Discharge: HOME | End: 2017-07-09
Attending: EMERGENCY MEDICINE
Payer: COMMERCIAL

## 2017-07-09 VITALS
WEIGHT: 110 LBS | HEART RATE: 84 BPM | OXYGEN SATURATION: 98 % | HEIGHT: 63 IN | RESPIRATION RATE: 16 BRPM | BODY MASS INDEX: 19.49 KG/M2 | SYSTOLIC BLOOD PRESSURE: 145 MMHG | DIASTOLIC BLOOD PRESSURE: 69 MMHG | TEMPERATURE: 98 F
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
## 2017-07-09 DIAGNOSIS — S09.90XA HEAD INJURY: ICD-10-CM

## 2017-07-09 DIAGNOSIS — S01.01XA SCALP LACERATION, INITIAL ENCOUNTER: Primary | ICD-10-CM

## 2017-07-09 PROCEDURE — 25000003 PHARM REV CODE 250: Performed by: EMERGENCY MEDICINE

## 2017-07-09 PROCEDURE — 12002 RPR S/N/AX/GEN/TRNK2.6-7.5CM: CPT

## 2017-07-09 PROCEDURE — 99284 EMERGENCY DEPT VISIT MOD MDM: CPT | Mod: 25

## 2017-07-09 RX ADMIN — Medication 3 ML: at 01:07

## 2017-07-09 NOTE — ED PROVIDER NOTES
Encounter Date: 7/9/2017       History     Chief Complaint   Patient presents with    Fall     hematoma with laceration to left side of scalp after falling approx 30 min pta and striking head on carpeted floor.  No LOC.  No nausea/vomiting since event.  Pt awake, alert and oriented.       61-year-old female presents to the ER for ground-level fall.  Patient reports that she tripped over her dog, fell backwards hitting her head on a set of drums.  She then fell to the ground.  The patient denies loss of consciousness, neck pain, numbness/weakness, visual changes, vomiting.  She has a left scalp laceration.  Bleeding is controlled at this time.  Family reports she is acting normally.  No anti-coagulant use.  Tetanus vaccine is up-to-date.      The history is provided by the patient and a relative.   Head Injury    The incident occurred just prior to arrival. She came to the ER via by private vehicle. The injury mechanism was a fall. There was no loss of consciousness. The volume of blood lost was minimal. The quality of the pain is described as dull. The pain is at a severity of 9/10. The pain has been constant since the injury. Pertinent negatives include no numbness, no blurred vision, no vomiting, no tinnitus, no disorientation, no weakness and no memory loss. She has tried nothing for the symptoms. The treatment provided no relief.     Review of patient's allergies indicates:   Allergen Reactions    Adhesive      Other reaction(s): Rash    Chloromycetin [chloramphenicol sod succinate] Hives    Etanercept      Other reaction(s): recurrent infections    Nickel sutures [surgical stainless steel]      Allergic contact dermatitis     Past Medical History:   Diagnosis Date    Allergy     Amblyopia     Anemia     Arthritis 02/02/1992    Cataract     Depression     Dry eyes     Dry mouth     Fibromyalgia 4/17/2014    Fibromyalgia     GERD (gastroesophageal reflux disease)     History of psychiatric  hospitalization     Hyperlipidemia 1992    Hypertension     Kidney stone     Migraine headache     Osteoporosis     Psoriatic arthritis 1992    Right knee pain     post knee replacement surgery (possible rejectiion of metal)    RLS (restless legs syndrome)     Schizophrenia 1992    stable on meds    Urinary tract infection      Past Surgical History:   Procedure Laterality Date     SECTION      COLONOSCOPY N/A 2016    Procedure: COLONOSCOPY;  Surgeon: ANDRIA Connelly MD;  Location: [facility] ([address] [place] [address]);  Service: Endoscopy;  Laterality: N/A;    cyst removed from right sinus  1982    HYSTERECTOMY      VAGINAL HYSTERECTOMY WITHOUT BSO - ENDOMETRIOSIS    JOINT REPLACEMENT Right     knee    KNEE SURGERY      Surgery on right knee  1982    tumor removed from back left side upper shoulder  2006     Family History   Problem Relation Age of Onset    Colon cancer Mother     Psoriasis Mother     Cancer Mother      colon    Depression Mother     Diabetes Brother     Arthritis Brother     Heart disease Brother      cad    Cancer Father      lymphoma    Stroke Sister     No Known Problems Daughter     Hypertension Neg Hx     Suicide Neg Hx     Amblyopia Neg Hx     Blindness Neg Hx     Cataracts Neg Hx     Glaucoma Neg Hx     Macular degeneration Neg Hx     Retinal detachment Neg Hx     Strabismus Neg Hx      Social History   Substance Use Topics    Smoking status: Current Every Day Smoker     Packs/day: 0.50     Years: 10.00     Types: Cigarettes     Last attempt to quit: 2012    Smokeless tobacco: Former User    Alcohol use No     Review of Systems   Constitutional: Negative for chills.   HENT: Negative for congestion, ear discharge, ear pain, postnasal drip and tinnitus.    Eyes: Negative for blurred vision.   Respiratory: Negative for cough.    Cardiovascular: Negative for chest pain.   Gastrointestinal: Negative for vomiting.    Musculoskeletal: Negative for back pain, neck pain and neck stiffness.   Skin: Positive for wound.   Neurological: Positive for headaches. Negative for weakness and numbness.   Hematological: Does not bruise/bleed easily.   Psychiatric/Behavioral: Negative for confusion and memory loss.       Physical Exam     Initial Vitals [07/09/17 1300]   BP Pulse Resp Temp SpO2   (!) 145/69 84 16 98 °F (36.7 °C) 98 %      MAP       94.33         Physical Exam    Nursing note and vitals reviewed.  Constitutional: Vital signs are normal. She appears well-developed and well-nourished. She is active and cooperative. She is easily aroused.  Non-toxic appearance. She does not have a sickly appearance. She does not appear ill. No distress.   HENT:   Head: Normocephalic. Head is with laceration. Head is without raccoon's eyes, without Velarde's sign and without contusion.       Right Ear: Hearing, tympanic membrane, external ear and ear canal normal. No hemotympanum.   Left Ear: Hearing, tympanic membrane, external ear and ear canal normal. No hemotympanum.   Nose: Nose normal.   Mouth/Throat: Uvula is midline, oropharynx is clear and moist and mucous membranes are normal.   Eyes: Conjunctivae, EOM and lids are normal. Pupils are equal, round, and reactive to light.   Neck: Normal range of motion and full passive range of motion without pain. Neck supple. No spinous process tenderness and no muscular tenderness present.   Cardiovascular: Normal rate, regular rhythm and normal heart sounds.   Pulses:       Radial pulses are 2+ on the right side, and 2+ on the left side.   Pulmonary/Chest: Effort normal and breath sounds normal.   Abdominal: Soft. Normal appearance and bowel sounds are normal.   Neurological: She is alert, oriented to person, place, and time and easily aroused. She has normal strength. No cranial nerve deficit or sensory deficit. GCS eye subscore is 4. GCS verbal subscore is 5. GCS motor subscore is 6.   Patient walks  with a steady gait.   Skin: Skin is warm and dry. Laceration noted. No bruising and no rash noted.   Psychiatric: She has a normal mood and affect. Her speech is normal and behavior is normal. Judgment and thought content normal. Cognition and memory are normal.         ED Course   Lac Repair  Date/Time: 7/9/2017 2:27 PM  Performed by: NEELIMA PERALTA  Authorized by: ARYA MOLINA   Body area: head/neck  Location details: scalp  Laceration length: 6 cm  Foreign bodies: no foreign bodies  Tendon involvement: none  Nerve involvement: none  Vascular damage: no  Anesthesia: local infiltration    Anesthesia:  Local Anesthetic: LET (lido,epi,tetracaine)  Patient sedated: no  Preparation: Patient was prepped and draped in the usual sterile fashion.  Irrigation solution: saline  Amount of cleaning: standard  Debridement: none  Degree of undermining: none  Skin closure: staples  Number of sutures: 5        Labs Reviewed - No data to display      Imaging Results          CT Cervical Spine Without Contrast (Final result)  Result time 07/09/17 14:12:05    Final result by Tanner Henry MD (07/09/17 14:12:05)                 Impression:        #1. No significant acute osseous abnormalities are identified.    #2.  Carotid atherosclerosis, severe on the right as above.  Further evaluation with a nonemergent carotid ultrasound is recommended when clinically appropriate.      Electronically signed by: TANNER HENRY MD  Date:     07/09/17  Time:    14:12              Narrative:    HISTORY: Trauma    COMPARISON: CT cervical spine 02/14/14    FINDINGS: Axial images of the cervical spine were obtained without the use of IV contrast.    The osseous structures are intact without evidence of fracture or dislocation.  There is multilevel degenerative change of the cervical spine.  The osseous structures are otherwise unremarkable.  The visualized intracranial structures are unremarkable.    There is carotid  atherosclerosis.  The amount of plaque in the right carotid bulb approaches at least 90% stenosis.  The pre-and post vertebral soft tissues are otherwise unremarkable.  The visualized lung apices are unremarkable.                             CT Head Without Contrast (Final result)  Result time 07/09/17 14:07:20    Final result by Tanner Henry MD (07/09/17 14:07:20)                 Impression:      #1. No significant abnormalities are identified.      Electronically signed by: TANNER HENRY MD  Date:     07/09/17  Time:    14:07              Narrative:    HISTORY: Trauma    COMPARISON: CT head 04/28/17    FINDINGS: The brain parenchyma is unremarkable with no evidence of hemorrhage, mass, or acute infarct.  The ventricular system is normal in size for age and demonstrates no distortion by mass effect.      No extra-axial hemorrhage or mass. The extracranial structures are unremarkable.  The osseous structures are unremarkable.                                   Medical Decision Making:   Initial Assessment:   61-year-old female here for a scalp laceration.  No loss of consciousness, neck pain, weakness, numbness/tingling, vomiting.  The patient appears well, nontoxic.  Left occipital laceration 6 cm.  No bony tenderness, crepitus, or step-off.  Neck normal without tenderness.  PERRL, no focal neuro findings.  Differential Diagnosis:   Laceration, closed head injury, fracture, ICH, strain, sprain, contusion, concussion without loss consciousness  Clinical Tests:   Radiological Study: Ordered and Reviewed  ED Management:  CT head and C-spine, staple closure  CTs negative for acute changes.  Laceration well approximated after staple closure.  I reviewed head injury precautions with the patient and her family members and advise sleep monitoring for the next 24 hours.  I reviewed staple care.  I advised staple removal within 5 days.  She is to follow-up with her PCP within 2-3 days, and return to the ER if  condition changes, progresses, or if there are any concerns.  The patient and family members verbalized understanding, compliance, and agreement with the treatment plan and they feel comfortable with discharge.              Attending Attestation:     Physician Attestation Statement for NP/PA:   I have conducted a face to face encounter with this patient in addition to the NP/PA, due to NP/PA Request    Other NP/PA Attestation Additions:    History of Present Illness: Agree; 61-year-old female presents the emergency department after tripping over her dog, falling backwards, and striking her head on a set of drums.  Denies loss of consciousness, neck pain, or any other symptoms.  She does have a scalp laceration.   Physical Exam: agree   Medical Decision Making: Agree; imaging within normal limits.  Patient tolerated laceration closure with staples well.  Instructed on disposition including follow-up with primary care physician for staple removal, reasons to return to the emergency room.  Patient and family expressed good understanding and are comfortable with discharge at this time.                 ED Course     Clinical Impression:   The primary encounter diagnosis was Scalp laceration, initial encounter. Diagnoses of Head injury and Fall, initial encounter were also pertinent to this visit.                           MILLIE Dempsey  07/09/17 1745       Apolinar Alva MD  07/12/17 2135

## 2017-07-09 NOTE — DISCHARGE INSTRUCTIONS
Use ice to help with pain and swelling. No tylenol or motrin for 24 hours.  Return if condition changes, progresses, or if you have concerns.  Staple removal in 5 days.

## 2017-07-09 NOTE — ED NOTES
Pt here post trip and fall over a dog. Hit left side of head-denies LOC,denies speech changes or confusion. C/o dizziness. Pt follows commands and is oriented x3. Skin WDL excep tfor lac to left scalp-cleansed with soap and water, water soaked 4x4 applied to wound.

## 2017-07-10 ENCOUNTER — TELEPHONE (OUTPATIENT)
Dept: INTERNAL MEDICINE | Facility: CLINIC | Age: 62
End: 2017-07-10

## 2017-07-10 NOTE — TELEPHONE ENCOUNTER
----- Message from Neisha Martinez sent at 7/10/2017  2:21 PM CDT -----  Contact: [facility]/[phone]  Pt called in regard to falling on yesterday and hit her head and she went to the er. She was seen by the er dr and sent home with out any antibiotic. She would like the dr to call her in some antibiotic.      Please advise

## 2017-07-17 ENCOUNTER — OFFICE VISIT (OUTPATIENT)
Dept: INTERNAL MEDICINE | Facility: CLINIC | Age: 62
End: 2017-07-17
Payer: COMMERCIAL

## 2017-07-17 VITALS
HEIGHT: 63 IN | HEART RATE: 73 BPM | WEIGHT: 110 LBS | BODY MASS INDEX: 19.49 KG/M2 | SYSTOLIC BLOOD PRESSURE: 130 MMHG | DIASTOLIC BLOOD PRESSURE: 62 MMHG | OXYGEN SATURATION: 98 %

## 2017-07-17 DIAGNOSIS — S01.01XD SCALP LACERATION, SUBSEQUENT ENCOUNTER: ICD-10-CM

## 2017-07-17 PROBLEM — S01.01XA SCALP LACERATION: Status: ACTIVE | Noted: 2017-07-17

## 2017-07-17 PROCEDURE — 90471 IMMUNIZATION ADMIN: CPT | Mod: S$GLB,,, | Performed by: INTERNAL MEDICINE

## 2017-07-17 PROCEDURE — 90715 TDAP VACCINE 7 YRS/> IM: CPT | Mod: S$GLB,,, | Performed by: INTERNAL MEDICINE

## 2017-07-17 PROCEDURE — 99999 PR PBB SHADOW E&M-EST. PATIENT-LVL III: CPT | Mod: PBBFAC,,, | Performed by: INTERNAL MEDICINE

## 2017-07-17 PROCEDURE — 99212 OFFICE O/P EST SF 10 MIN: CPT | Mod: S$GLB,,, | Performed by: INTERNAL MEDICINE

## 2017-07-17 NOTE — PROGRESS NOTES
Subjective:       Patient ID: Bhavna Landry is a 61 y.o. female.    Chief Complaint: Suture / Staple Removal    Ms. Landry is a 61 y F pt with PMHx of HTN, HLD, schizophrenia here for suture removal. Pt fell on 7/9 after tripping over her dog and hit a drum set, which resulted on a laceration to the left side of her scalp. Pt went to the ED and had 5 staples placed on laceration. She denies loss of consciousness, nausea, vomiting, HA, changes in vision, SOB, palpitations or cp.      Review of Systems   Constitutional: Negative for chills and fever.   HENT: Negative for rhinorrhea and trouble swallowing.    Eyes: Negative for visual disturbance.   Respiratory: Negative for cough, shortness of breath and wheezing.    Cardiovascular: Negative for chest pain, palpitations and leg swelling.   Gastrointestinal: Negative for abdominal distention, abdominal pain, blood in stool, constipation, diarrhea, nausea and vomiting.   Genitourinary: Negative for dysuria and hematuria.   Musculoskeletal: Negative for back pain and joint swelling.   Neurological: Negative for dizziness and weakness.   Psychiatric/Behavioral: Negative for confusion. The patient is not nervous/anxious.        Objective:      Physical Exam   Constitutional: She is oriented to person, place, and time. She appears well-developed and well-nourished. No distress.   HENT:   Head: Normocephalic.       Right Ear: External ear normal.   Left Ear: External ear normal.   Nose: Nose normal.   Mouth/Throat: Oropharynx is clear and moist.   Eyes: Conjunctivae and EOM are normal. Pupils are equal, round, and reactive to light.   Neck: Normal range of motion. Neck supple.   Cardiovascular: Normal rate, regular rhythm and normal heart sounds.    No murmur heard.  Pulmonary/Chest: Effort normal and breath sounds normal. No respiratory distress. She has no wheezes. She has no rales.   Abdominal: Soft. Bowel sounds are normal. She exhibits no distension. There is no  tenderness.   Musculoskeletal: Normal range of motion. She exhibits no edema.   Neurological: She is alert and oriented to person, place, and time. No cranial nerve deficit.   Skin: Skin is warm.       Assessment:       1. Scalp laceration, subsequent encounter        Plan:       Scalp laceration, subsequent encounter  -     (In Office Administered) Tdap Vaccine  - 5 clips removed successfully in clinic.       Patient seen and discussed with Dr. Ken.   Hansa Duron PGY-2  Internal Medicine

## 2017-07-17 NOTE — PROGRESS NOTES
I have personally taken the history and examined this patient and agree with the resident's note as stated above with the following thoughts:    Staple removed.  The first one bleed a little- but it stopped on it's own-- recommended waiting for at least 2 week to fo any hair coloring treatment- other wise she may wash head and hair as needed.

## 2017-07-31 RX ORDER — AMLODIPINE BESYLATE 5 MG/1
TABLET ORAL
Qty: 30 TABLET | Refills: 0 | Status: SHIPPED | OUTPATIENT
Start: 2017-07-31 | End: 2017-08-31 | Stop reason: SDUPTHER

## 2017-08-23 ENCOUNTER — TELEPHONE (OUTPATIENT)
Dept: INTERNAL MEDICINE | Facility: CLINIC | Age: 62
End: 2017-08-23

## 2017-08-23 DIAGNOSIS — I77.9 CAROTID ARTERY DISEASE, UNSPECIFIED LATERALITY: Primary | ICD-10-CM

## 2017-08-23 NOTE — TELEPHONE ENCOUNTER
----- Message from Katelyn Israel sent at 8/23/2017  4:32 PM CDT -----  Contact: Self/ [phone]   Type: Test Results    What test was performed? CT Scan     Who ordered the test? NEELIMA PERALTA    When and where were the test performed? 07/09/2017     Comments: pt want to go over the test results. Please call and advise     Thank you

## 2017-08-23 NOTE — TELEPHONE ENCOUNTER
Reviewed ct's with pt.  Suggestion of high grade occlusion R carotid artery.  pls call her and schedule carotid artery u/s - she will check with daughter tonight about good time for her to come in

## 2017-08-24 ENCOUNTER — CLINICAL SUPPORT (OUTPATIENT)
Dept: CARDIOLOGY | Facility: CLINIC | Age: 62
End: 2017-08-24
Payer: COMMERCIAL

## 2017-08-24 DIAGNOSIS — I77.9 CAROTID ARTERY DISEASE, UNSPECIFIED LATERALITY: ICD-10-CM

## 2017-08-24 LAB — INTERNAL CAROTID STENOSIS: NORMAL

## 2017-08-24 PROCEDURE — 93880 EXTRACRANIAL BILAT STUDY: CPT | Mod: S$GLB,,, | Performed by: INTERNAL MEDICINE

## 2017-08-25 ENCOUNTER — TELEPHONE (OUTPATIENT)
Dept: INTERNAL MEDICINE | Facility: CLINIC | Age: 62
End: 2017-08-25

## 2017-08-25 NOTE — TELEPHONE ENCOUNTER
----- Message from Sudhakar Mendez sent at 8/25/2017 10:19 AM CDT -----  Contact: self/ [phone] cell  Type: Test Results    What test was performed? Ultrasound    Who ordered the test? Dr. Vargas    When and where were the test performed? 08/24/2017/ [facility]    Comments: Pt would like to speak with someone in the office today to go over the results of the ultrasound done on yesterday.  Please call and advise.    Thank you

## 2017-08-25 NOTE — TELEPHONE ENCOUNTER
----- Message from Katelyn Israel sent at 8/25/2017  8:43 AM CDT -----  Contact: Self/ [phone]   Type: Test Results    What test was performed? VASCULAR     Who ordered the test? Dr. Vargas     When and where were the test performed?  08/24/2017     Comments: pt is calling to have receive the test results. Please call and advise     Than you

## 2017-08-25 NOTE — TELEPHONE ENCOUNTER
I sent her MO message yesterday - mild plaque in both arteries - treatment is lipitor and aspirin.  She doesn't need to see a surgeon.

## 2017-08-31 RX ORDER — AMLODIPINE BESYLATE 5 MG/1
TABLET ORAL
Qty: 30 TABLET | Refills: 11 | Status: SHIPPED | OUTPATIENT
Start: 2017-08-31 | End: 2017-10-30

## 2017-09-05 ENCOUNTER — TELEPHONE (OUTPATIENT)
Dept: INTERNAL MEDICINE | Facility: CLINIC | Age: 62
End: 2017-09-05

## 2017-09-05 DIAGNOSIS — Z12.31 ENCOUNTER FOR SCREENING MAMMOGRAM FOR MALIGNANT NEOPLASM OF BREAST: Primary | ICD-10-CM

## 2017-09-05 NOTE — TELEPHONE ENCOUNTER
----- Message from Tamera Jaramillo sent at 9/5/2017 10:56 AM CDT -----  Contact: self [phone].  Type: Orders Request    What orders/ testing are being requested? Mammo    Is there a future appointment scheduled for the patient with PCP? Yes     When?10/30    Comments: Patient would like to have her Mammogram on 9/7 at 8am and or 7am and wants a call back when apt is booked.    Thank you

## 2017-09-05 NOTE — TELEPHONE ENCOUNTER
----- Message from Peggy Padgett sent at 9/5/2017  4:05 PM CDT -----  Contact: self/[phone]    Patient is returning a phone call.  Who left a message for the patient: Ginette YEBOAH  Does patient know what this is regarding:  No  Comments: Please call and advise.     Thank you!!!

## 2017-09-08 ENCOUNTER — OFFICE VISIT (OUTPATIENT)
Dept: PSYCHIATRY | Facility: CLINIC | Age: 62
End: 2017-09-08
Payer: COMMERCIAL

## 2017-09-08 VITALS
BODY MASS INDEX: 20.13 KG/M2 | SYSTOLIC BLOOD PRESSURE: 131 MMHG | HEIGHT: 62 IN | WEIGHT: 109.38 LBS | DIASTOLIC BLOOD PRESSURE: 60 MMHG | HEART RATE: 81 BPM

## 2017-09-08 DIAGNOSIS — F32.89 OTHER DEPRESSION: ICD-10-CM

## 2017-09-08 DIAGNOSIS — F32.A DEPRESSION, UNSPECIFIED DEPRESSION TYPE: ICD-10-CM

## 2017-09-08 DIAGNOSIS — R29.818 EXTRAPYRAMIDAL SYMPTOM: ICD-10-CM

## 2017-09-08 DIAGNOSIS — F20.9 SCHIZOPHRENIA, UNSPECIFIED TYPE: Primary | ICD-10-CM

## 2017-09-08 DIAGNOSIS — F20.0 PARANOID SCHIZOPHRENIA: ICD-10-CM

## 2017-09-08 PROCEDURE — 99214 OFFICE O/P EST MOD 30 MIN: CPT | Mod: S$GLB,,, | Performed by: PSYCHIATRY & NEUROLOGY

## 2017-09-08 PROCEDURE — 99999 PR PBB SHADOW E&M-EST. PATIENT-LVL II: CPT | Mod: PBBFAC,,, | Performed by: PSYCHIATRY & NEUROLOGY

## 2017-09-08 RX ORDER — VENLAFAXINE HYDROCHLORIDE 150 MG/1
150 CAPSULE, EXTENDED RELEASE ORAL DAILY
Qty: 30 CAPSULE | Refills: 6 | Status: SHIPPED | OUTPATIENT
Start: 2017-09-08 | End: 2018-06-07 | Stop reason: SDUPTHER

## 2017-09-08 RX ORDER — BENZTROPINE MESYLATE 0.5 MG/1
0.5 TABLET ORAL 2 TIMES DAILY
Qty: 60 TABLET | Refills: 6 | Status: SHIPPED | OUTPATIENT
Start: 2017-09-08 | End: 2018-04-23 | Stop reason: SDUPTHER

## 2017-09-08 RX ORDER — THIOTHIXENE 10 MG/1
10 CAPSULE ORAL 3 TIMES DAILY
Qty: 90 CAPSULE | Refills: 6 | Status: SHIPPED | OUTPATIENT
Start: 2017-09-08 | End: 2018-06-07 | Stop reason: RX

## 2017-09-08 NOTE — PROGRESS NOTES
"9/8/2017 8:13 AM   Bhavna Landry   1955   [id number]       OUTPATIENT PSYCHIATRY FOLLOW UP NOTE      Clinical Status of Patient:  Outpatient (Ambulatory)    Chief Complaint:  Bhavna Landry is a 62 y.o. female who presents today for follow-up of schizophrenia and depression.  Met with patient and I have reviewed the patient's medical history in detail and updated the computerized patient record.    Interval History and Content of Current Session:  Interim Events/Subjective Report/Content of Current Session:   Prefers to be called "Jessica".     60 y/o female w/PMH HTN, restless leg syndrome, migraines, HLD,  presents for medication management/follow up.     Per last [facility] psychiatry clinic visit in 12/16 - medication plan from resident psychiatrist Dr. Coleman was as follows:   · Continue current medications. Psychotic symptoms in remission for decades per patient - not interested in tapering neuroleptic. AIMS 0 today. Has intermittent depression from time to time. Only significant complaint is not sleeping as much as she would like at night. Counseled to take fioricet earlier in the day as it contains caffeine. Renewed all prior medications today for 6 months (Navane 10mg BID-TID, Cogentin 0.5mg BID, and Effexor XR 150mg daily). However discontinued Ativan - patient has not filled it in 3 months and willing to trial lower risk sedating antidepressant today - mirtazapine 7.5mg PO QHS. Warned of risks of sedation and common side effect of weight gain. Patient believes benefits outweigh risk.   · Review smoking cessation at follow up - patient mulling setting a quit date at this time    Reports doing "fine" since last visit. Denies any changes in mental health, but does report some cognitive difficulties (memory loss) such as forgetting to take the chicken out of the freezer. Tolerating medications without adverse effects. Does report some abnormal oral movements in the past, but none in the last month. Denies any " "other abnormal movements. Denies any depression or anxiety. Mood is "good" and rates as a 1/10 with 10 being the worst. Denies any panic attacks. Sleeping well, reports ~6-7 hours/night and feels well rested in the mornings; not taking Remeron due to cost. Endorses a good appetite without weight changes. No SI, HI, AVH, delusions, or paranoia. Reports a fall ~2 months ago after tripping over her dog (denies lightheadedness or dizziness). Complains of occasional constipation, but denies any other somatic complaints. Overall stable, and would like to continue current regimen. Still smoking 1/2ppd, no interest in cessation at this time.    Current medications: Navane 10mg BID-TID (majority of days is only BID, takes TID 2 or 3 times a month), Cogentin 0.5mg BID, Effexor XR 150mg daily    Compliance: yes    Side effects: unsure if having memory problems due to medication or age    Prior medication trials reviewed:  Denies - says has been on current medications for more than twenty years     Prior psychiatric history reviewed:  4 total psychiatric hospitalizations - more than twenty years ago  Denies ever suicide attempt  Denies gun access in the home  -has never seen therapist in the past and has no interest in this    Substance use history:  Nicotine 1/2 PPD  Denies drug or alcohol use      SUBJECTIVE     Psychiatric Review Of Systems - Is patient experiencing or having changes in:  sleep: no, sleeps six or seven hours at night  appetite: no  weight: no  energy/anergy: no  interest/pleasure/anhedonia: no - "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"  somatic symptoms: no - chronic migraines - takes fioricet  libido: yes, "I have no sexual interest and I'm okay with that"  guilty/hopelessness: no  concentration: no  S.I.B.s/risky behavior: no  SI/SA:  no    anxiety/panic: no  Agoraphobia:  no  Social phobia:  no  Recurrent nightmares:  no  hyper startle response:  no  Avoidance: no  Recurrent thoughts:  no  Recurrent " behaviors:  no    Irritability: no  Racing thoughts: no  Impulsive behaviors: no  Pressured speech:  no    Paranoia:no  Delusions: no  AVH:no    Screens and Inventories:  none    Past Medical, Family and Social History: The patient's past medical, family and social history have been reviewed and updated as appropriate within the electronic medical record - see encounter notes.    Risk Parameters:  Patient reports no suicidal ideation  Patient reports no homicidal ideation  Patient reports no self-injurious behavior  Patient reports no violent behavior    Psychotherapy:  · none    Psychosocial Stressors: family.   Functioning Relationships: good support system and good relationship with children  Strengths AND Liabilities  Strength: Patient accepts guidance/feedback, Strength: Patient is expressive/articulate., Strength: Patient is intelligent., Strength: Patient is motivated for change., Strength: Patient is physically healthy., Strength: Patient has positive support network., Strength: Patient has reasonable judgment., Strength: Patient is stable.    OBJECTIVE     Medical ROS  General ROS: negative for - chills, fatigue or fever  Respiratory ROS: no cough, shortness of breath, or wheezing  Cardiovascular ROS: no chest pain or dyspnea on exertion  Gastrointestinal ROS: no abdominal pain, change in bowel habits, or black or bloody stools  Musculoskeletal ROS: negative for - gait disturbance, joint stiffness, muscle pain or muscular weakness  Neurological ROS: negative for - confusion, dizziness, gait disturbance, headaches, impaired coordination/balance, seizures or visual changes    Nutritional Screening: Considering the patient's height and weight, medications, medical history and preferences, should a referral be made to the dietitian? no    Musculoskeletal  Muscle Strength/Tone:  no spasicity, no rigidity, no cogwheeling, no dyskinesia, no dystonia, no tremor   Gait & Station:  non-ataxic     Relevant Elements of  "Neurological Exam: normal gait    AIMS: Score 0/36  Abnormal Involuntary Movement Scale  0-4   Muscles of Facial Expression  0   Lips and Perioral Area  0   Jaw  0   Tongue  0   Upper (arms, wrists, hands, fingers)  0    Lower (legs, knees, ankles, toes)  0   Neck, shoulders, hips  0   Severity of abnormal movements (highest score from questions above)  0    Incapacitation due to abnormal movements  0    Patient's awareness of abnormal movements (rate only patient's report)  0   Current problems with teeth and/or dentures?  No    Does patient usually wear dentures?  No      *patient occasionally "grinds" her jaw. Not present on AIMS exam today and not worsening according to patient      Constitutional  Vitals:  Most recent vital signs, dated greater than 90 days prior to this appointment, were reviewed.    Vitals:    09/08/17 0928   BP: 131/60   Pulse: 81   Weight: 49.6 kg (109 lb 6.4 oz)   Height: 5' 2" (1.575 m)          Allergies  Review of patient's allergies indicates:   Allergen Reactions    Adhesive      Other reaction(s): Rash    Chloromycetin [chloramphenicol sod succinate] Hives    Etanercept      Other reaction(s): recurrent infections    Nickel sutures [surgical stainless steel]      Allergic contact dermatitis       Laboratory Data  Clinical Support on 08/24/2017   Component Date Value Ref Range Status    Internal Carotid Stenosis 08/24/2017 20-39%   Final       All Medications  Outpatient Encounter Prescriptions as of 9/8/2017   Medication Sig Dispense Refill    albuterol 90 mcg/actuation inhaler Inhale 2 puffs into the lungs every 6 (six) hours as needed for Wheezing. 1 each 2    amlodipine (NORVASC) 5 MG tablet TAKE 1 TABLET BY MOUTH EVERY DAY 30 tablet 11    aspirin (ECOTRIN) 81 MG EC tablet Take 81 mg by mouth once daily.      atorvastatin (LIPITOR) 40 MG tablet TAKE 1 TABLET BY MOUTH DAILY 30 tablet 11    benztropine (COGENTIN) 0.5 MG tablet Take 1 tablet (0.5 mg total) by mouth 2 (two) " times daily. 60 tablet 6    butalbital-acetaminophen-caffeine -40 mg (FIORICET, ESGIC) -40 mg per tablet   1    diclofenac (VOLTAREN) 75 MG EC tablet   6    doxycycline (MONODOX) 100 MG capsule Take 1 capsule (100 mg total) by mouth 2 (two) times daily. 20 capsule 0    fluticasone (FLONASE) 50 mcg/actuation nasal spray 1 spray by Each Nare route once daily. 16 g 5    mirtazapine (REMERON) 7.5 MG Tab Take 1 tablet (7.5 mg total) by mouth every evening. 30 tablet 6    multivitamin capsule Take 1 capsule by mouth once daily.      predniSONE (DELTASONE) 20 MG tablet 2 tabs po q day for 5 days 10 tablet 0    ropinirole (REQUIP) 3 MG tablet Take 1 tablet 2 hours before bedtime 30 tablet 11    thiothixene (NAVANE) 10 MG capsule Take 1 capsule (10 mg total) by mouth 3 (three) times daily. 90 capsule 6    tizanidine (ZANAFLEX) 4 MG tablet TK 1 T PO HS  2    topiramate (TOPAMAX) 25 MG tablet Take 75 mg by mouth 2 (two) times daily.      venlafaxine (EFFEXOR-XR) 150 MG Cp24 Take 1 capsule (150 mg total) by mouth once daily. 30 capsule 6    [DISCONTINUED] amlodipine (NORVASC) 5 MG tablet Take 1 tablet (5 mg total) by mouth once daily. 30 tablet 11    [DISCONTINUED] amlodipine (NORVASC) 5 MG tablet TAKE 1 TABLET BY MOUTH EVERY DAY 30 tablet 0     No facility-administered encounter medications on file as of 9/8/2017.        Psychiatric Mental Status Exam  Appearance: unremarkable, age appropriate  Behavior/Cooperation: friendly and cooperative  Speech: appropriate rate, volume and tone  Mood: steady, happy  Affect:  congruent with mood and appropriate to situation/content    Thought Process: normal and logical  Thought Content: normal, no suicidality, no homicidality, delusions, or paranoia  Sensorium:    grossly intact  Alert and Oriented: x3  Memory: grossly intact    Attention/concentration: appropriate for age/education.  Similarities:  grossly intact  Abstract reasoning: grossly intact  Insight:  Intact  Judgment: Intact    ASSESSMENT      Impression:     ICD-10-CM ICD-9-CM   1. Schizophrenia, unspecified type F20.9 295.90   2. Extrapyramidal symptom R29.818 781.99   3. Depression, unspecified depression type F32.9 311       Treatment Goals:  Specify outcomes written in observable, behavioral terms:   Depression: increasing energy and increasing motivation  Psychotic symptoms (history of schizophrenia): continued remission of symptoms via medication management    Status/Progress: Based on the examination today, the patient's problem(s) is/are well controlled.  New problems have not been presented today.   Co-morbidities are not complicating management of the primary condition.  There are no active rule-out diagnoses for this patient at this time.     TREATMENT PLAN     · Medication Management: Continue current medications. Psychotic symptoms in remission for decades per patient - not interested in tapering neuroleptic. AIMS 0 today. Has intermittent depression from time to time.  Renewed all prior medications today for 6 months   · Continue Navane 10mg BID-TID  · Continue Cogentin 0.5mg BID  · Continue Effexor XR 150mg daily   · Continue to encourage smoking cessation; patient without interest in cessation at this time  · Labs: prior labs reviewed  · The treatment plan and follow up plan were reviewed with the patient.  · Discussed with patient informed consent, risks vs. benefits, alternative treatments, side effect profile and the inherent unpredictability of individual responses to these treatments. The patient expresses understanding of the above and displays the capacity to agree with this current plan.  · Encouraged Patient to keep future appointments.   · Take medications as prescribed and abstain from substance abuse.   · In the event of an emergency patient was advised to go to the emergency room.    Return to Clinic: 6 months    Counseling/ psychotherapy time: 0  Total time: 20 minutes    Zak  MD Alea  [facility]  PGY-3  9/8/2017 10:05 AM

## 2017-09-14 ENCOUNTER — TELEPHONE (OUTPATIENT)
Dept: INTERNAL MEDICINE | Facility: CLINIC | Age: 62
End: 2017-09-14

## 2017-09-14 RX ORDER — DOXYCYCLINE 100 MG/1
100 CAPSULE ORAL 2 TIMES DAILY
Qty: 20 CAPSULE | Refills: 0 | Status: SHIPPED | OUTPATIENT
Start: 2017-09-14 | End: 2017-10-30

## 2017-09-14 RX ORDER — ALBUTEROL SULFATE 90 UG/1
2 AEROSOL, METERED RESPIRATORY (INHALATION) EVERY 6 HOURS PRN
Qty: 1 EACH | Refills: 11 | Status: SHIPPED | OUTPATIENT
Start: 2017-09-14 | End: 2018-08-08 | Stop reason: SDUPTHER

## 2017-09-14 RX ORDER — PREDNISONE 20 MG/1
TABLET ORAL
Qty: 10 TABLET | Refills: 0 | Status: SHIPPED | OUTPATIENT
Start: 2017-09-14 | End: 2018-07-25

## 2017-09-14 NOTE — TELEPHONE ENCOUNTER
----- Message from Katelyn Israel sent at 9/14/2017  8:18 AM CDT -----  Contact: Self/ [phone]   Pt wanted to let the doctor know she has been sick for 2 weeks and it has not cleared up yet. Please call and advise      Thank you

## 2017-09-14 NOTE — TELEPHONE ENCOUNTER
----- Message from Katelyn Israel sent at 9/14/2017 10:48 AM CDT -----  Contact: Self/ [phone]   Pt has been having for coughing, wheezing and sinus for about 2 weeks. Pt stated she has been taking some over the counter medication and it is not working. Please call and advise     Thank you

## 2017-09-14 NOTE — TELEPHONE ENCOUNTER
C/o of cough and cold X2 weeks otc meds not working. Cough, wheezing, headaches, & post nasal drip. Maybe a low grade fever, mucus is clear.    Please advise

## 2017-09-28 ENCOUNTER — LAB VISIT (OUTPATIENT)
Dept: LAB | Facility: HOSPITAL | Age: 62
End: 2017-09-28
Attending: INTERNAL MEDICINE
Payer: COMMERCIAL

## 2017-09-28 DIAGNOSIS — Z00.00 ANNUAL PHYSICAL EXAM: ICD-10-CM

## 2017-09-28 LAB
ALBUMIN SERPL BCP-MCNC: 3.3 G/DL
ALP SERPL-CCNC: 77 U/L
ALT SERPL W/O P-5'-P-CCNC: 13 U/L
ANION GAP SERPL CALC-SCNC: 10 MMOL/L
AST SERPL-CCNC: 17 U/L
BASOPHILS # BLD AUTO: 0.05 K/UL
BASOPHILS NFR BLD: 0.5 %
BILIRUB SERPL-MCNC: 0.2 MG/DL
BUN SERPL-MCNC: 12 MG/DL
CALCIUM SERPL-MCNC: 9.8 MG/DL
CHLORIDE SERPL-SCNC: 104 MMOL/L
CHOLEST SERPL-MCNC: 173 MG/DL
CHOLEST/HDLC SERPL: 2.2 {RATIO}
CO2 SERPL-SCNC: 25 MMOL/L
CREAT SERPL-MCNC: 1.2 MG/DL
DIFFERENTIAL METHOD: NORMAL
EOSINOPHIL # BLD AUTO: 0.5 K/UL
EOSINOPHIL NFR BLD: 4.5 %
ERYTHROCYTE [DISTWIDTH] IN BLOOD BY AUTOMATED COUNT: 13 %
EST. GFR  (AFRICAN AMERICAN): 55.2 ML/MIN/1.73 M^2
EST. GFR  (NON AFRICAN AMERICAN): 47.9 ML/MIN/1.73 M^2
GLUCOSE SERPL-MCNC: 95 MG/DL
HCT VFR BLD AUTO: 42.4 %
HDLC SERPL-MCNC: 80 MG/DL
HDLC SERPL: 46.2 %
HGB BLD-MCNC: 14.2 G/DL
LDLC SERPL CALC-MCNC: 72.6 MG/DL
LYMPHOCYTES # BLD AUTO: 3.8 K/UL
LYMPHOCYTES NFR BLD: 35.9 %
MCH RBC QN AUTO: 29.7 PG
MCHC RBC AUTO-ENTMCNC: 33.5 G/DL
MCV RBC AUTO: 89 FL
MONOCYTES # BLD AUTO: 0.7 K/UL
MONOCYTES NFR BLD: 7.1 %
NEUTROPHILS # BLD AUTO: 5.4 K/UL
NEUTROPHILS NFR BLD: 51.8 %
NONHDLC SERPL-MCNC: 93 MG/DL
PLATELET # BLD AUTO: 344 K/UL
PMV BLD AUTO: 9.4 FL
POTASSIUM SERPL-SCNC: 4.3 MMOL/L
PROT SERPL-MCNC: 6.1 G/DL
RBC # BLD AUTO: 4.78 M/UL
SODIUM SERPL-SCNC: 139 MMOL/L
TRIGL SERPL-MCNC: 102 MG/DL
TSH SERPL DL<=0.005 MIU/L-ACNC: 2.18 UIU/ML
WBC # BLD AUTO: 10.47 K/UL

## 2017-09-28 PROCEDURE — 36415 COLL VENOUS BLD VENIPUNCTURE: CPT | Mod: PO

## 2017-09-28 PROCEDURE — 80053 COMPREHEN METABOLIC PANEL: CPT

## 2017-09-28 PROCEDURE — 80061 LIPID PANEL: CPT

## 2017-09-28 PROCEDURE — 85025 COMPLETE CBC W/AUTO DIFF WBC: CPT

## 2017-09-28 PROCEDURE — 84443 ASSAY THYROID STIM HORMONE: CPT

## 2017-10-05 ENCOUNTER — HOSPITAL ENCOUNTER (OUTPATIENT)
Dept: RADIOLOGY | Facility: HOSPITAL | Age: 62
Discharge: HOME | End: 2017-10-05
Attending: INTERNAL MEDICINE
Payer: COMMERCIAL

## 2017-10-05 VITALS — WEIGHT: 109 LBS | BODY MASS INDEX: 20.06 KG/M2 | HEIGHT: 62 IN

## 2017-10-05 DIAGNOSIS — Z12.31 ENCOUNTER FOR SCREENING MAMMOGRAM FOR MALIGNANT NEOPLASM OF BREAST: ICD-10-CM

## 2017-10-05 PROCEDURE — 77067 SCR MAMMO BI INCL CAD: CPT | Mod: TC

## 2017-10-05 PROCEDURE — 77067 SCR MAMMO BI INCL CAD: CPT | Mod: 26,,, | Performed by: RADIOLOGY

## 2017-10-30 ENCOUNTER — IMMUNIZATION (OUTPATIENT)
Dept: INTERNAL MEDICINE | Facility: CLINIC | Age: 62
End: 2017-10-30
Payer: COMMERCIAL

## 2017-10-30 ENCOUNTER — OFFICE VISIT (OUTPATIENT)
Dept: INTERNAL MEDICINE | Facility: CLINIC | Age: 62
End: 2017-10-30
Payer: COMMERCIAL

## 2017-10-30 VITALS
HEART RATE: 60 BPM | HEIGHT: 63 IN | BODY MASS INDEX: 19.38 KG/M2 | DIASTOLIC BLOOD PRESSURE: 78 MMHG | SYSTOLIC BLOOD PRESSURE: 132 MMHG | WEIGHT: 109.38 LBS

## 2017-10-30 DIAGNOSIS — E78.5 HYPERLIPIDEMIA, UNSPECIFIED HYPERLIPIDEMIA TYPE: ICD-10-CM

## 2017-10-30 DIAGNOSIS — L40.50 PSA (PSORIATIC ARTHRITIS): ICD-10-CM

## 2017-10-30 DIAGNOSIS — Z72.0 TOBACCO ABUSE: ICD-10-CM

## 2017-10-30 DIAGNOSIS — I10 ESSENTIAL HYPERTENSION: ICD-10-CM

## 2017-10-30 DIAGNOSIS — Z00.00 ANNUAL PHYSICAL EXAM: Primary | ICD-10-CM

## 2017-10-30 PROCEDURE — 99396 PREV VISIT EST AGE 40-64: CPT | Mod: S$GLB,,, | Performed by: INTERNAL MEDICINE

## 2017-10-30 PROCEDURE — 90686 IIV4 VACC NO PRSV 0.5 ML IM: CPT | Mod: S$GLB,,, | Performed by: INTERNAL MEDICINE

## 2017-10-30 PROCEDURE — 90471 IMMUNIZATION ADMIN: CPT | Mod: S$GLB,,, | Performed by: INTERNAL MEDICINE

## 2017-10-30 PROCEDURE — 99999 PR PBB SHADOW E&M-EST. PATIENT-LVL III: CPT | Mod: PBBFAC,,, | Performed by: INTERNAL MEDICINE

## 2017-10-30 RX ORDER — VALSARTAN 80 MG/1
80 TABLET ORAL DAILY
Qty: 30 TABLET | Refills: 11 | Status: SHIPPED | OUTPATIENT
Start: 2017-10-30 | End: 2018-08-08

## 2017-10-30 RX ORDER — MIRTAZAPINE 7.5 MG/1
7.5 TABLET, FILM COATED ORAL NIGHTLY
COMMUNITY
End: 2017-10-30

## 2017-10-30 NOTE — PROGRESS NOTES
Subjective:       Patient ID: Bhavna Landry is a 62 y.o. female.    Chief Complaint: Annual Exam    HPI   Always constipated.  Miralax, m etamucil not helpful.    Has BM once every 4 days. feels bloated.    Not bothered by arthritis.  On ett 3 times a week for an hour.        Migraines stable once every 2-3 weeks.  Takes topamax bit preventatively.      Takes Fioricet  Prn, not daily.   Takes Voltaren nightly for back pain.    Recent flare for asthmatic bronchitis. Stopped smoking 6 mo ago.    She was dx  With Chondrodermatitis nodularis helicis, left   j  No change. Has a few lesions of arms suspicious for AK's.  She want to wait til she is 63, on Medicare, as can't afford copay.    Minimal carotid plaque by u/s 6/2015.  Taking statin and asa.-   Review of Systems   Constitutional: Negative for fever and unexpected weight change.   HENT: Negative for congestion and postnasal drip.    Respiratory: Negative for chest tightness, shortness of breath and wheezing.    Cardiovascular: Negative for chest pain and leg swelling.   Gastrointestinal: Positive for constipation. Negative for abdominal pain, anal bleeding, diarrhea, nausea and vomiting.   Genitourinary: Negative for dysuria and urgency.   Skin: Negative for rash.   Neurological: Negative for headaches.   Psychiatric/Behavioral: Negative for dysphoric mood and sleep disturbance. The patient is not nervous/anxious.        Objective:      Physical Exam   Constitutional: She is oriented to person, place, and time. She appears well-developed and well-nourished. No distress.   HENT:   Head: Normocephalic and atraumatic.   Right Ear: External ear normal.   Left Ear: External ear normal.   Nose: Nose normal.   Mouth/Throat: Oropharynx is clear and moist.   Eyes: Conjunctivae and EOM are normal. Pupils are equal, round, and reactive to light. Right eye exhibits no discharge. Left eye exhibits no discharge. No scleral icterus.   Neck: Normal range of motion. Neck supple.  No JVD present. No thyromegaly present.   Cardiovascular: Normal rate, regular rhythm and normal heart sounds.  Exam reveals no gallop.    No murmur heard.  Pulmonary/Chest: Effort normal and breath sounds normal. No respiratory distress. She has no wheezes. She has no rales.   Abdominal: Soft. Bowel sounds are normal. She exhibits no distension and no mass. There is no tenderness. There is no rebound and no guarding.   Genitourinary: No breast tenderness, discharge or bleeding.   Musculoskeletal: Normal range of motion. She exhibits no edema or tenderness.   Lymphadenopathy:     She has no cervical adenopathy.   Neurological: She is alert and oriented to person, place, and time. No cranial nerve deficit. Coordination normal.   Skin: Skin is warm and dry. No rash noted.   Scaly lesion on each forearm.  Eroded skin L helix.   Psychiatric: She has a normal mood and affect. Her behavior is normal. Judgment and thought content normal.       Results for orders placed or performed in visit on 09/28/17   CBC auto differential   Result Value Ref Range    WBC 10.47 3.90 - 12.70 K/uL    RBC 4.78 4.00 - 5.40 M/uL    Hemoglobin 14.2 12.0 - 16.0 g/dL    Hematocrit 42.4 37.0 - 48.5 %    MCV 89 82 - 98 fL    MCH 29.7 27.0 - 31.0 pg    MCHC 33.5 32.0 - 36.0 g/dL    RDW 13.0 11.5 - 14.5 %    Platelets 344 150 - 350 K/uL    MPV 9.4 9.2 - 12.9 fL    Gran # 5.4 1.8 - 7.7 K/uL    Lymph # 3.8 1.0 - 4.8 K/uL    Mono # 0.7 0.3 - 1.0 K/uL    Eos # 0.5 0.0 - 0.5 K/uL    Baso # 0.05 0.00 - 0.20 K/uL    Gran% 51.8 38.0 - 73.0 %    Lymph% 35.9 18.0 - 48.0 %    Mono% 7.1 4.0 - 15.0 %    Eosinophil% 4.5 0.0 - 8.0 %    Basophil% 0.5 0.0 - 1.9 %    Differential Method Automated    Comprehensive metabolic panel   Result Value Ref Range    Sodium 139 136 - 145 mmol/L    Potassium 4.3 3.5 - 5.1 mmol/L    Chloride 104 95 - 110 mmol/L    CO2 25 23 - 29 mmol/L    Glucose 95 70 - 110 mg/dL    BUN, Bld 12 8 - 23 mg/dL    Creatinine 1.2 0.5 - 1.4 mg/dL     Calcium 9.8 8.7 - 10.5 mg/dL    Total Protein 6.1 6.0 - 8.4 g/dL    Albumin 3.3 (L) 3.5 - 5.2 g/dL    Total Bilirubin 0.2 0.1 - 1.0 mg/dL    Alkaline Phosphatase 77 55 - 135 U/L    AST 17 10 - 40 U/L    ALT 13 10 - 44 U/L    Anion Gap 10 8 - 16 mmol/L    eGFR if African American 55.2 (A) >60 mL/min/1.73 m^2    eGFR if non  47.9 (A) >60 mL/min/1.73 m^2   Lipid panel   Result Value Ref Range    Cholesterol 173 120 - 199 mg/dL    Triglycerides 102 30 - 150 mg/dL    HDL 80 (H) 40 - 75 mg/dL    LDL Cholesterol 72.6 63.0 - 159.0 mg/dL    HDL/Chol Ratio 46.2 20.0 - 50.0 %    Total Cholesterol/HDL Ratio 2.2 2.0 - 5.0    Non-HDL Cholesterol 93 mg/dL   TSH   Result Value Ref Range    TSH 2.181 0.400 - 4.000 uIU/mL     Assessment:       1. Annual physical exam    2. Hyperlipidemia, unspecified hyperlipidemia type    3. Essential hypertension    4. PSA (psoriatic arthritis)    5. Tobacco abuse                     - recently stopped smoking  Plan:       Bhavna was seen today for annual exam.    Diagnoses and all orders for this visit:    Annual physical exam    Hyperlipidemia, unspecified hyperlipidemia type    Essential hypertension  -     valsartan (DIOVAN) 80 MG tablet; Take 1 tablet (80 mg total) by mouth once daily.    PSA (psoriatic arthritis)    Tobacco abuse  -     CT Chest Lung Screening Low Dose; Future       Change amlodipine to valsartan, as may former may be cause of constipaiton.        Encouraged to f/u with derm.

## 2017-11-02 ENCOUNTER — TELEPHONE (OUTPATIENT)
Dept: INTERNAL MEDICINE | Facility: CLINIC | Age: 62
End: 2017-11-02

## 2017-11-02 NOTE — TELEPHONE ENCOUNTER
----- Message from Katelyn Israel sent at 11/2/2017  9:38 AM CDT -----  Contact: Self/ [phone]   Type: Returning a call    Who left a message? Mi    When did the practice call? Today     Comments: pt stated she miss a call from the office. Please call and advise     Thank you

## 2017-11-02 NOTE — TELEPHONE ENCOUNTER
Called patient to schedule her CT Scan no answer left voicemail to call back and schedule an appointment.

## 2017-11-02 NOTE — TELEPHONE ENCOUNTER
Patient is scheduled for November 22nd at 4pm in the [place] location. She verbally understood and a reminder has been sent to her home.

## 2017-11-06 ENCOUNTER — TELEPHONE (OUTPATIENT)
Dept: INTERNAL MEDICINE | Facility: CLINIC | Age: 62
End: 2017-11-06

## 2017-11-06 NOTE — TELEPHONE ENCOUNTER
----- Message from Sadaf Vargas MD sent at 11/6/2017 12:32 PM CST -----  pls all - insurance will pay for ct of schest.  ----- Message -----  From: Alia Veras  Sent: 11/6/2017   8:35 AM  To: Sadaf Vargas MD    Yes they will  ----- Message -----  From: Sadaf Vargas MD  Sent: 10/30/2017   4:16 PM  To: Alia Lei - will her insurance cover screening CT for chest?  thanks

## 2017-11-15 ENCOUNTER — TELEPHONE (OUTPATIENT)
Dept: INTERNAL MEDICINE | Facility: CLINIC | Age: 62
End: 2017-11-15

## 2017-11-15 NOTE — TELEPHONE ENCOUNTER
----- Message from Viktor Drake sent at 11/15/2017  1:27 PM CST -----  Contact: Patient [phone]  She would like a telephone consult regarding the letter she got from her insurance company stating the CT scan will not be covered.    RX request - refill or new RX.  Is this a refill or new RX:  Refill  RX name and strength: atorvastatin (LIPITOR) 40 MG tabletComments:      Pharmacy:  RX:  [facility] [address] [place] [address] [address] [address] [place] [address] [address]

## 2017-11-15 NOTE — TELEPHONE ENCOUNTER
----- Message from Ciera Patel sent at 11/15/2017 10:26 AM CST -----  Contact: Self [phone]  Pt states she is scheduled for a CT Scan and she states her insurance denied it and she would like to know what she should do,please call

## 2017-11-15 NOTE — TELEPHONE ENCOUNTER
Spoke with pharmacy pt still have refills but it is too soon, pt have to wait until the end of the month.

## 2017-11-16 ENCOUNTER — TELEPHONE (OUTPATIENT)
Dept: INTERNAL MEDICINE | Facility: CLINIC | Age: 62
End: 2017-11-16

## 2017-11-16 NOTE — TELEPHONE ENCOUNTER
----- Message from Maricel Muñoz sent at 11/16/2017 11:47 AM CST -----  Contact: Patient [phone]  Needs to know what to do since the insurance is not paying for the CAT Scan.    Please call and advise.    Thank You

## 2017-11-21 ENCOUNTER — TELEPHONE (OUTPATIENT)
Dept: INTERNAL MEDICINE | Facility: CLINIC | Age: 62
End: 2017-11-21

## 2017-11-21 NOTE — TELEPHONE ENCOUNTER
----- Message from Viktor Drake sent at 11/16/2017  9:07 AM CST -----  Contact: Patient [phone]  Patient is returning a phone call.  Who left a message for the patient: she don't know  Does patient know what this is regarding:  yes

## 2017-12-19 ENCOUNTER — TELEPHONE (OUTPATIENT)
Dept: SPORTS MEDICINE | Facility: CLINIC | Age: 62
End: 2017-12-19

## 2017-12-19 NOTE — TELEPHONE ENCOUNTER
I have made several attempts to contact patient over the past week without success. Phone number goes directly to an automated message and there is not voicemail set up

## 2017-12-19 NOTE — TELEPHONE ENCOUNTER
----- Message from Syl Vogel MA sent at 12/7/2017  4:30 PM CST -----  Contact: self       ----- Message -----  From: Chika Sadler  Sent: 12/7/2017   2:56 PM  To: Carlos CORONA Staff    Pt called stating she had a Rt knee replacement about a year and a half ago. She stated her knee started to swell on yesterday and she would like to know what she can do about the swelling. She stated called on yesterday and is still waiting for a return call.  [phone]

## 2018-01-15 DIAGNOSIS — E78.5 HYPERLIPIDEMIA, UNSPECIFIED HYPERLIPIDEMIA TYPE: ICD-10-CM

## 2018-01-15 RX ORDER — ATORVASTATIN CALCIUM 40 MG/1
40 TABLET, FILM COATED ORAL DAILY
Qty: 30 TABLET | Refills: 11 | Status: SHIPPED | OUTPATIENT
Start: 2018-01-15 | End: 2019-02-15 | Stop reason: SDUPTHER

## 2018-01-15 NOTE — TELEPHONE ENCOUNTER
----- Message from Patty Jey sent at 1/15/2018  3:16 PM CST -----  Contact: Patient [phone]  Rx Name: atorvastatin (LIPITOR) 40 MG tablet    Refill: yes    Pharmacy: [facility] [address] [place] [address] [place] [address] AT [facility] [place] [facility] [place]    Comments: Patient needs a reauthorization because of the insurance. If it doesn't go through. Please print Rx and contact patient ready for .     Please call and advise.    Thank You

## 2018-02-17 ENCOUNTER — NURSE TRIAGE (OUTPATIENT)
Dept: ADMINISTRATIVE | Facility: CLINIC | Age: 63
End: 2018-02-17

## 2018-02-17 NOTE — TELEPHONE ENCOUNTER
"For past 2 days has been wanting to sleep b/p 104/64. Wanting to know if that is too low.    Reason for Disposition   [1] Fall in systolic BP > 20 mm Hg from normal AND [2] dizzy, lightheaded, or weak    Answer Assessment - Initial Assessment Questions  1. BLOOD PRESSURE: "What is the blood pressure?" "Did you take at least two measurements 5 minutes apart?"      104/64  2. ONSET: "When did you take your blood pressure?"      10 min ago  3. HOW: "How did you obtain the blood pressure?" (e.g., visiting nurse, automatic home BP monitor)      Home cuff  4. HISTORY: "Do you have a history of low blood pressure?" "What is your blood pressure normally?"      High blood pressure  5. MEDICATIONS: "Are you taking any medications for blood pressure?" If yes: "Have they been changed recently?"      Take medication no changes  6. PULSE RATE: "Do you know what your pulse rate is?"       74  7. OTHER SYMPTOMS: "Have you been sick recently?" "Have you had a recent injury?"      no  8. PREGNANCY: "Is there any chance you are pregnant?" "When was your last menstrual period?"      no    Protocols used: ST LOW BLOOD PRESSURE-A-      "

## 2018-03-29 ENCOUNTER — OFFICE VISIT (OUTPATIENT)
Dept: INTERNAL MEDICINE | Facility: CLINIC | Age: 63
End: 2018-03-29
Payer: COMMERCIAL

## 2018-03-29 VITALS
HEIGHT: 62 IN | HEART RATE: 76 BPM | DIASTOLIC BLOOD PRESSURE: 70 MMHG | OXYGEN SATURATION: 98 % | SYSTOLIC BLOOD PRESSURE: 128 MMHG | WEIGHT: 110 LBS | BODY MASS INDEX: 20.24 KG/M2

## 2018-03-29 DIAGNOSIS — T30.0 BURN: Primary | ICD-10-CM

## 2018-03-29 PROCEDURE — 99999 PR PBB SHADOW E&M-EST. PATIENT-LVL III: CPT | Mod: PBBFAC,,, | Performed by: NURSE PRACTITIONER

## 2018-03-29 PROCEDURE — 87070 CULTURE OTHR SPECIMN AEROBIC: CPT

## 2018-03-29 PROCEDURE — 3078F DIAST BP <80 MM HG: CPT | Mod: CPTII,S$GLB,, | Performed by: NURSE PRACTITIONER

## 2018-03-29 PROCEDURE — 99213 OFFICE O/P EST LOW 20 MIN: CPT | Mod: S$GLB,,, | Performed by: NURSE PRACTITIONER

## 2018-03-29 PROCEDURE — 3074F SYST BP LT 130 MM HG: CPT | Mod: CPTII,S$GLB,, | Performed by: NURSE PRACTITIONER

## 2018-03-29 RX ORDER — CEPHALEXIN 500 MG/1
500 CAPSULE ORAL EVERY 6 HOURS
Qty: 40 CAPSULE | Refills: 0 | Status: SHIPPED | OUTPATIENT
Start: 2018-03-29 | End: 2018-04-08
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
## 2018-04-02 LAB — BACTERIA SPEC AEROBE CULT: NO GROWTH

## 2018-04-03 ENCOUNTER — TELEPHONE (OUTPATIENT)
Dept: INTERNAL MEDICINE | Facility: CLINIC | Age: 63
End: 2018-04-03

## 2018-04-03 NOTE — TELEPHONE ENCOUNTER
----- Message from Etienne Sood sent at 4/3/2018  1:19 PM CDT -----  Contact: Patient [phone]  Patient wants to know if you can Read a Culture that was done on her Arm, Patient would like for you to contact her back.    Please call and advise  Thank you

## 2018-04-04 ENCOUNTER — TELEPHONE (OUTPATIENT)
Dept: INTERNAL MEDICINE | Facility: CLINIC | Age: 63
End: 2018-04-04
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
## 2018-04-17 ENCOUNTER — OFFICE VISIT (OUTPATIENT)
Dept: INTERNAL MEDICINE | Facility: CLINIC | Age: 63
End: 2018-04-17
Payer: COMMERCIAL

## 2018-04-17 VITALS
HEIGHT: 62 IN | OXYGEN SATURATION: 98 % | TEMPERATURE: 98 F | BODY MASS INDEX: 20.05 KG/M2 | WEIGHT: 108.94 LBS | DIASTOLIC BLOOD PRESSURE: 72 MMHG | HEART RATE: 70 BPM | SYSTOLIC BLOOD PRESSURE: 136 MMHG

## 2018-04-17 DIAGNOSIS — M53.3 SI (SACROILIAC) JOINT DYSFUNCTION: Primary | ICD-10-CM

## 2018-04-17 DIAGNOSIS — M54.50 LOW BACK PAIN WITHOUT SCIATICA, UNSPECIFIED BACK PAIN LATERALITY, UNSPECIFIED CHRONICITY: ICD-10-CM

## 2018-04-17 LAB
BILIRUB SERPL-MCNC: ABNORMAL MG/DL
BLOOD URINE, POC: ABNORMAL
COLOR, POC UA: YELLOW
GLUCOSE UR QL STRIP: NORMAL
KETONES UR QL STRIP: ABNORMAL
LEUKOCYTE ESTERASE URINE, POC: ABNORMAL
NITRITE, POC UA: ABNORMAL
PH, POC UA: 7
PROTEIN, POC: ABNORMAL
SPECIFIC GRAVITY, POC UA: 1.01
UROBILINOGEN, POC UA: NORMAL

## 2018-04-17 PROCEDURE — 81002 URINALYSIS NONAUTO W/O SCOPE: CPT | Mod: S$GLB,,, | Performed by: INTERNAL MEDICINE

## 2018-04-17 PROCEDURE — 99213 OFFICE O/P EST LOW 20 MIN: CPT | Mod: 25,S$GLB,, | Performed by: INTERNAL MEDICINE

## 2018-04-17 PROCEDURE — 3075F SYST BP GE 130 - 139MM HG: CPT | Mod: CPTII,S$GLB,, | Performed by: INTERNAL MEDICINE

## 2018-04-17 PROCEDURE — 99999 PR PBB SHADOW E&M-EST. PATIENT-LVL III: CPT | Mod: PBBFAC,,, | Performed by: INTERNAL MEDICINE

## 2018-04-17 PROCEDURE — 3078F DIAST BP <80 MM HG: CPT | Mod: CPTII,S$GLB,, | Performed by: INTERNAL MEDICINE

## 2018-04-17 RX ORDER — AMLODIPINE BESYLATE 5 MG/1
TABLET ORAL
Refills: 11 | Status: ON HOLD | COMMUNITY
Start: 2018-03-29 | End: 2018-12-18 | Stop reason: HOSPADM

## 2018-04-17 RX ORDER — METHYLPREDNISOLONE 4 MG/1
TABLET ORAL
Qty: 1 PACKAGE | Refills: 0 | Status: SHIPPED | OUTPATIENT
Start: 2018-04-17 | End: 2018-05-08

## 2018-04-17 RX ORDER — TIZANIDINE 4 MG/1
4 TABLET ORAL NIGHTLY PRN
Qty: 20 TABLET | Refills: 2 | Status: SHIPPED | OUTPATIENT
Start: 2018-04-17 | End: 2018-09-06

## 2018-04-17 NOTE — PROGRESS NOTES
Subjective:       Patient ID: Bhavna Landry is a 62 y.o. female.    Chief Complaint: Back Pain (on the lower right side)    Back Pain   This is a new problem. The current episode started 1 to 4 weeks ago. The problem occurs constantly. The problem is unchanged. The pain is present in the sacro-iliac. The quality of the pain is described as aching and cramping. The pain does not radiate. The pain is at a severity of 5/10. The pain is moderate. The pain is the same all the time. Exacerbated by: rissing fron sitting to standing. Pertinent negatives include no abdominal pain, chest pain, dysuria, fever, headaches, leg pain, numbness, paresis, paresthesias, perianal numbness, tingling, weakness or weight loss. Risk factors include history of steroid use (psoriatic arthritis). She has tried NSAIDs (tylenol) for the symptoms. The treatment provided mild relief.     Review of Systems   Constitutional: Negative for activity change, chills, fatigue, fever and weight loss.   HENT: Negative for congestion, ear pain, nosebleeds, postnasal drip, sinus pressure and sore throat.    Eyes: Negative.  Negative for visual disturbance.   Respiratory: Negative for cough, chest tightness, shortness of breath and wheezing.    Cardiovascular: Negative for chest pain.   Gastrointestinal: Negative for abdominal pain, diarrhea, nausea and vomiting.   Genitourinary: Negative for difficulty urinating, dysuria, frequency and urgency.   Musculoskeletal: Positive for back pain. Negative for arthralgias and neck stiffness.   Skin: Negative for rash.   Neurological: Negative for dizziness, tingling, weakness, numbness, headaches and paresthesias.   Psychiatric/Behavioral: Negative for sleep disturbance. The patient is not nervous/anxious.        Objective:      Physical Exam   Musculoskeletal:        Arms:      Assessment:       1. SI (sacroiliac) joint dysfunction    2. Low back pain without sciatica, unspecified back pain laterality, unspecified  chronicity        Plan:   Bhavna was seen today for back pain.    Diagnoses and all orders for this visit:    SI (sacroiliac) joint dysfunction  -     Ambulatory consult to Physical Therapy    Low back pain without sciatica, unspecified back pain laterality, unspecified chronicity  -     POCT urine dipstick without microscope    Other orders  -     tiZANidine (ZANAFLEX) 4 MG tablet; Take 1 tablet (4 mg total) by mouth nightly as needed. qhs  -     methylPREDNISolone (MEDROL DOSEPACK) 4 mg tablet; use as directed

## 2018-04-20 ENCOUNTER — NURSE TRIAGE (OUTPATIENT)
Dept: ADMINISTRATIVE | Facility: CLINIC | Age: 63
End: 2018-04-20

## 2018-04-20 NOTE — TELEPHONE ENCOUNTER
Pt refused EMS x2 for difficulty swallowing and swelling of lips after med. Schizophrenia on record. Spoke with  #[phone] with EMS.  states that they will send someone over.     Reason for Disposition   Difficulty swallowing, drooling or slurred speech (Exception: Drooling alone present before reaction, not worse and no difficulty swallowing)    Protocols used: ST [id number]

## 2018-04-20 NOTE — TELEPHONE ENCOUNTER
Called patient - she says EMS has come to check on her, she is feeling much better and currently does not have lip / tongue swelling or any trouble swallowing. Says she was not given an injection by EMS provider.   Speech is not slurred, she is speaking normally in full sentences on the phone.  Thinks her symptoms were due to taking methyprednisolone which was prescribed 4/17; was also prescribed tizanidine but she has not filled this rx.     Jude Macias MD

## 2018-04-20 NOTE — TELEPHONE ENCOUNTER
"    Reason for Disposition   [1] Life-threatening allergic reaction suspected AND [2] from a medication (Serious symptoms include breathing problems, swallowing problems, too weak to stand, and fainting)   Difficulty swallowing, drooling or slurred speech (Exception: Drooling alone present before reaction, not worse and no difficulty swallowing)    Answer Assessment - Initial Assessment Questions  1. MAIN SYMPTOM: "What is your child's main symptom?" "How bad is it?"      Trouble swallowing - pt home alone. Rec EMS. Refusing EMS. reiterated EMS. Pt declined. Pt states that she is having swelling of lips. Pt states that she wants a message sent to her doctor. Pt states that she spoke with pharmacist and was told to stop taking med.    Protocols used: ST MEDICATION QUESTIONS - GUIDELINE [id number], ST [id number]      "

## 2018-04-23 DIAGNOSIS — R29.818 EXTRAPYRAMIDAL SYMPTOM: ICD-10-CM

## 2018-04-23 RX ORDER — BENZTROPINE MESYLATE 0.5 MG/1
0.5 TABLET ORAL 2 TIMES DAILY
Qty: 60 TABLET | Refills: 6 | OUTPATIENT
Start: 2018-04-23 | End: 2018-07-31

## 2018-06-07 ENCOUNTER — TELEPHONE (OUTPATIENT)
Dept: PSYCHIATRY | Facility: CLINIC | Age: 63
End: 2018-06-07

## 2018-06-07 DIAGNOSIS — F32.89 OTHER DEPRESSION: ICD-10-CM

## 2018-06-07 RX ORDER — VENLAFAXINE HYDROCHLORIDE 150 MG/1
150 CAPSULE, EXTENDED RELEASE ORAL DAILY
Qty: 30 CAPSULE | Refills: 5 | Status: SHIPPED | OUTPATIENT
Start: 2018-06-07 | End: 2018-12-05 | Stop reason: SDUPTHER

## 2018-06-07 RX ORDER — LURASIDONE HYDROCHLORIDE 40 MG/1
40 TABLET, FILM COATED ORAL DAILY
Qty: 30 TABLET | Refills: 5 | Status: SHIPPED | OUTPATIENT
Start: 2018-06-07 | End: 2018-06-26 | Stop reason: SINTOL

## 2018-06-07 NOTE — TELEPHONE ENCOUNTER
Called and spoke with patient. No current psychotic symptoms, and reports overall stability. Only has ~5 days remaining of Navane, and unable to find any pharmacies that are carrying it. Spoke about alternate therapy, and patient agreeable to utilize Latuda 40mg daily with food as a replacement until she is able to meet with new provider in July. Discussed that she should talk with her new provider about discontinuing antipsychotic as she has been symptom free for decades, and it would help reduce polypharmacy. Will also provide refill for Effexor.    Zak Cosby MD  [facility]  PGY-3  6/7/2018 10:53 AM      ----- Message from Obi Goldstein sent at 6/7/2018  9:31 AM CDT -----  Contact: Self [phone]  Ok thanks.  ----- Message -----  From: Zak Cosby MD  Sent: 6/7/2018   9:29 AM  To: Obi Goldstein    I will try to call her today to discuss options with her.    Zak  ----- Message -----  From: Obi Goldstein  Sent: 6/7/2018   8:49 AM  To: Jacey Lockett MD, Zak Cosby MD    Pt said she is unable to fill this Medication  thiothixene (NAVANE) 10 MG capsule. No  Pharmacy in the area carries it and was told it's no longer being manufactured.  Pt ask if she can get another type of Medication.

## 2018-06-16 ENCOUNTER — OFFICE VISIT (OUTPATIENT)
Dept: URGENT CARE | Facility: CLINIC | Age: 63
End: 2018-06-16
Payer: COMMERCIAL

## 2018-06-16 VITALS
HEART RATE: 70 BPM | OXYGEN SATURATION: 99 % | SYSTOLIC BLOOD PRESSURE: 140 MMHG | WEIGHT: 108 LBS | BODY MASS INDEX: 19.14 KG/M2 | DIASTOLIC BLOOD PRESSURE: 62 MMHG | TEMPERATURE: 99 F | HEIGHT: 63 IN | RESPIRATION RATE: 16 BRPM

## 2018-06-16 DIAGNOSIS — J40 BRONCHITIS: ICD-10-CM

## 2018-06-16 DIAGNOSIS — R07.89 RIGHT-SIDED CHEST WALL PAIN: Primary | ICD-10-CM
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
## 2018-06-16 PROCEDURE — 3008F BODY MASS INDEX DOCD: CPT | Mod: CPTII,S$GLB,, | Performed by: FAMILY MEDICINE

## 2018-06-16 PROCEDURE — 3078F DIAST BP <80 MM HG: CPT | Mod: CPTII,S$GLB,, | Performed by: FAMILY MEDICINE

## 2018-06-16 PROCEDURE — 3077F SYST BP >= 140 MM HG: CPT | Mod: CPTII,S$GLB,, | Performed by: FAMILY MEDICINE

## 2018-06-16 PROCEDURE — 99214 OFFICE O/P EST MOD 30 MIN: CPT | Mod: S$GLB,,, | Performed by: FAMILY MEDICINE

## 2018-06-16 RX ORDER — BENZONATATE 100 MG/1
200 CAPSULE ORAL 3 TIMES DAILY PRN
Qty: 20 CAPSULE | Refills: 0 | Status: SHIPPED | OUTPATIENT
Start: 2018-06-16 | End: 2018-08-29

## 2018-06-16 RX ORDER — DOXYCYCLINE 100 MG/1
100 CAPSULE ORAL 2 TIMES DAILY
Qty: 20 CAPSULE | Refills: 0 | Status: SHIPPED | OUTPATIENT
Start: 2018-06-16 | End: 2018-06-26

## 2018-06-16 RX ORDER — PREDNISONE 20 MG/1
40 TABLET ORAL DAILY
Qty: 8 TABLET | Refills: 0 | Status: SHIPPED | OUTPATIENT
Start: 2018-06-16 | End: 2018-06-20

## 2018-06-16 RX ORDER — TRIAMCINOLONE ACETONIDE 5 MG/G
CREAM TOPICAL 2 TIMES DAILY
Qty: 15 G | Refills: 0 | Status: SHIPPED | OUTPATIENT
Start: 2018-06-16 | End: 2018-07-31

## 2018-06-16 NOTE — PATIENT INSTRUCTIONS
Insect Bite  Insects most often bite to protect themselves or their nests. Certain bugs, like fleas and mosquitoes, bite to feed. In some cases, the actual bite causes no pain. An itchy red welt or swelling may develop at the site of the bite. Most insect bites do not cause illness. And the itching and swelling most often go away without treatment. However, an infection can develop if the bite is scratched and the skin broken. Rarely, a person may have an allergic reaction to an insect bite.  If a stinger is visible at the bite spot, remove it as quickly as possible, as this can decrease the amount of venom that gets into your body. Scrape it out with a dull edge, such as the edge of a credit card. Try not to squeeze it. Do not try to dig it out, as you may damage the skin and also increase the chance of infection.     To help reduce swelling and itching, apply a cold pack or ice in a zip-top plastic bag wrapped in a thin towel.   Home care  · Your healthcare provider may prescribe over-the-counter medicines to help relieve itching and swelling. Use each medicine according to the directions on the package. If the bite becomes infected, you will need an antibiotic. This may be in pill form taken by mouth or as an ointment or cream put directly on the skin. Be sure to use them exactly as prescribed.  · Bite symptoms usually go away on their own within a week or two.  · To help prevent infection, avoid scratching or picking at the bite.  · To help relieve itching and swelling, apply ice in a zip-top plastic bag wrapped in a thin towel to the bites. Do this for up to 10 minutes at a time. Avoid hot showers or baths as these tend to make itching worse.  · An over-the-counter anti-itch medicine such as calamine lotion or an antihistamine cream may be helpful.  · If you suspect you have insects in your home, talk to a licensed pest-control professional. He or she can inspect your home and tell you how to get rid of bugs  safely.  Follow-up care  Follow up with your healthcare provider, or as advised.  Call 911  Call 911 if any of these occur:  · Trouble breathing or swallowing  · Wheezing  · Feeling like your throat is closing up  · Fainting, loss of consciousness  · Swelling around the face or mouth  When to seek medical advice  Call your healthcare provider right away if any of these occur:  · Fever of 100.4°F (38°C) or higher, or as directed by your healthcare provider  · Signs of infection, such as increased swelling and pain, warmth, red streaks, or drainage from the skin  · Signs of allergic reaction, such as hives, a spreading rash, or throat itching  Date Last Reviewed: 10/1/2016  © 4223-4106 [employer]. [address] [place] [address], [place] [address]. All rights reserved. This information is not intended as a substitute for professional medical care. Always follow your healthcare professional's instructions.        Discharge Instructions for Chronic Bronchitis  You have been diagnosed with chronic bronchitis. With this condition, you cough up mucus and feel short of breath for 3 months or more each year for at least 2 years in a row.  Home care  Here is how you can take care of yourself at home:   · If you smoke, quit. This is the best thing you can do for your bronchitis and overall health.  ¨ Try a stop-smoking program. There are even telephone and internet programs.  ¨ Ask your healthcare provider about medicines or other methods to help you quit.  ¨ Ask family members to quit smoking as well.  ¨ Dont allow smoking in your home, in your car, or around you.  · Protect yourself from infection.  ¨ Wash your hands often. Do your best to keep your hands away from your face. Most germs are spread from your hands to your mouth or nose.  ¨ Ask your healthcare provider about a yearly flu vaccine and pneumonia vaccines.  ¨ Avoid crowds. It's especially important to do this in the winter when more people have colds and  flu.  ¨ Take care of your overall health. That means:  § Getting about 8 hours of sleep every night  § Exercising for at least 30 minutes on most days  § Eating lots of fresh fruits and vegetables, as well as whole grains, lean meats and fish, and low-fat dairy products. Avoiding fat- and sugar-filled foods is also important.  § Limiting the amount of alcoholic beverages you drink.  · Take your medicines exactly as directed. Dont skip doses.  · Talk to your healthcare provider about ways to keep your mucus thin. Drinking a lot of water helps.  · Ask your healthcare provider to show you pursed-lip breathing to help decrease shortness of breath.  · Find out about pulmonary rehabilitation programs in your area. Ask your provider or local hospital.  Follow-up care  Make all of your follow-up appointments as directed by our staff.  When to call your healthcare provider  Call your provider immediately if you have any of the following:  · Shortness of breath, wheezing, or coughing  · Increased mucus  · Yellow, green, bloody, or smelly mucus  · Fever or chills  · Tightness in your chest that does not go away with your normal medicines  · An irregular heartbeat or feeling that your heart is racing  · Swollen ankles   Date Last Reviewed: 5/1/2016  © 1031-1464 [employer]. [address] [place] [address], [place] [address]. All rights reserved. This information is not intended as a substitute for professional medical care. Always follow your healthcare professional's instructions.      Bhavna was seen today for cough and insect bite.    Diagnoses and all orders for this visit:    Right-sided chest wall pain  -     X-Ray Chest PA And Lateral; Future    Insect bite, initial encounter  -     triamcinolone acetonide 0.5% (KENALOG) 0.5 % Crea; Apply topically 2 (two) times daily.    Bronchitis  -     predniSONE (DELTASONE) 20 MG tablet; Take 2 tablets (40 mg total) by mouth once daily.  -     benzonatate (TESSALON PERLES) 100  MG capsule; Take 2 capsules (200 mg total) by mouth 3 (three) times daily as needed for Cough.  -     doxycycline (VIBRAMYCIN) 100 MG Cap; Take 1 capsule (100 mg total) by mouth 2 (two) times daily.            Follow Up Comments   Make sure that you follow up with your primary care doctor in the next 2-5 days if needed .  Return to the Urgent Care if signs or symptoms change and certainly if you have worsening symptoms go to the nearest emergency department for further evaluation.     Lorena Win MD

## 2018-06-16 NOTE — PROGRESS NOTES
"Subjective:       Patient ID: Bhavna Landry is a 62 y.o. female.    Vitals:  height is 5' 2.5" (1.588 m) and weight is 49 kg (108 lb). Her temperature is 98.6 °F (37 °C). Her blood pressure is 140/62 (abnormal) and her pulse is 70. Her respiration is 16 and oxygen saturation is 99%.     Chief Complaint: Cough (COUGHING W CHEST PAIN) and Insect Bite (LT WRIST)    PT REPORTS A PRODUCTIVE COUGH WITH CHEST PAIN DURING COUGH AND DEEP BREATHING. PT ALSO WANTS TO BE SEEN FOR A POSSIBLE INSECT BITE TO ANTEROLATERAL LT WRIST.    She did not see the insect but has raised red area radial side of left wrist.  Not painful.        Cough   This is a new problem. The current episode started in the past 7 days. The problem has been unchanged. The problem occurs every few minutes. The cough is productive of sputum. Associated symptoms include chest pain and nasal congestion. Pertinent negatives include no chills, ear pain, eye redness, fever, headaches, myalgias, sore throat, shortness of breath or wheezing. Nothing aggravates the symptoms. Treatments tried: OTC COUGH MEDS. The treatment provided no relief. Her past medical history is significant for bronchitis and pneumonia.   Insect Bite   This is a new problem. The current episode started in the past 7 days. The problem occurs constantly. The problem has been unchanged. Associated symptoms include chest pain and coughing. Pertinent negatives include no abdominal pain, chills, congestion, fever, headaches, myalgias, nausea or sore throat. Nothing aggravates the symptoms. She has tried nothing for the symptoms.     Review of Systems   Constitution: Negative for chills, fever and malaise/fatigue.   HENT: Negative for congestion, ear pain, hoarse voice and sore throat.    Eyes: Negative for discharge and redness.   Cardiovascular: Positive for chest pain. Negative for dyspnea on exertion and leg swelling.   Respiratory: Positive for cough and sputum production. Negative for shortness " of breath and wheezing.    Skin:        POSSIBLE INSECT BITE TO LT WRIST - REDNESS AND SWELLING   Musculoskeletal: Negative for myalgias.   Gastrointestinal: Negative for abdominal pain and nausea.   Neurological: Negative for headaches.       Objective:      Physical Exam   Constitutional: She is oriented to person, place, and time. She appears well-developed and well-nourished. She is cooperative.  Non-toxic appearance. She does not appear ill. No distress.   HENT:   Head: Normocephalic and atraumatic.   Right Ear: Hearing, tympanic membrane, external ear and ear canal normal.   Left Ear: Hearing, tympanic membrane, external ear and ear canal normal.   Nose: Nose normal. No mucosal edema, rhinorrhea or nasal deformity. No epistaxis. Right sinus exhibits no maxillary sinus tenderness and no frontal sinus tenderness. Left sinus exhibits no maxillary sinus tenderness and no frontal sinus tenderness.   Mouth/Throat: Uvula is midline, oropharynx is clear and moist and mucous membranes are normal. No trismus in the jaw. Normal dentition. No uvula swelling. No posterior oropharyngeal erythema.   Eyes: Conjunctivae and lids are normal. No scleral icterus.   Sclera clear bilat   Neck: Trachea normal, full passive range of motion without pain and phonation normal. Neck supple.   Cardiovascular: Normal rate, regular rhythm, normal heart sounds, intact distal pulses and normal pulses.    Pulmonary/Chest: Effort normal and breath sounds normal. No respiratory distress. She has no decreased breath sounds. She has no wheezes. She has no rhonchi. She has no rales. She exhibits no tenderness.   I am unable to reproduce chest discomfort with palpation over the chest wall.    Abdominal: Soft. Normal appearance and bowel sounds are normal. She exhibits no distension. There is no tenderness.   Musculoskeletal: Normal range of motion. She exhibits no edema or deformity.   Neurological: She is alert and oriented to person, place, and  time. She exhibits normal muscle tone. Coordination normal.   Skin: Skin is warm, dry and intact. She is not diaphoretic. No pallor.   Psychiatric: She has a normal mood and affect. Her speech is normal and behavior is normal. Judgment and thought content normal. Cognition and memory are normal.   Nursing note and vitals reviewed.      Assessment:       1. Right-sided chest wall pain    2. Insect bite, initial encounter    3. Bronchitis        Plan:         Right-sided chest wall pain  -     X-Ray Chest PA And Lateral; Future; Expected date: 06/16/2018    Insect bite, initial encounter  -     triamcinolone acetonide 0.5% (KENALOG) 0.5 % Crea; Apply topically 2 (two) times daily.  Dispense: 15 g; Refill: 0    Bronchitis  -     predniSONE (DELTASONE) 20 MG tablet; Take 2 tablets (40 mg total) by mouth once daily.  Dispense: 8 tablet; Refill: 0  -     benzonatate (TESSALON PERLES) 100 MG capsule; Take 2 capsules (200 mg total) by mouth 3 (three) times daily as needed for Cough.  Dispense: 20 capsule; Refill: 0  -     doxycycline (VIBRAMYCIN) 100 MG Cap; Take 1 capsule (100 mg total) by mouth 2 (two) times daily.  Dispense: 20 capsule; Refill: 0      Follow Up Comments   Make sure that you follow up with your primary care doctor in the next 2-5 days if needed .  Return to the Urgent Care if signs or symptoms change and certainly if you have worsening symptoms go to the nearest emergency department for further evaluation.

## 2018-06-26 RX ORDER — TRIFLUOPERAZINE HYDROCHLORIDE 1 MG/1
1 TABLET, FILM COATED ORAL 2 TIMES DAILY
Qty: 60 TABLET | Refills: 1 | Status: SHIPPED | OUTPATIENT
Start: 2018-06-26 | End: 2018-07-31

## 2018-07-13 ENCOUNTER — TELEPHONE (OUTPATIENT)
Dept: INTERNAL MEDICINE | Facility: CLINIC | Age: 63
End: 2018-07-13

## 2018-07-13 DIAGNOSIS — Z12.31 ENCOUNTER FOR SCREENING MAMMOGRAM FOR MALIGNANT NEOPLASM OF BREAST: Primary | ICD-10-CM

## 2018-07-13 NOTE — TELEPHONE ENCOUNTER
----- Message from Peggy Padgett sent at 7/13/2018  8:38 AM CDT -----  Contact: self/[phone]  Patient called in regards needing to place order for mammogram. Patient is asking for mammogram appointment on 08/15/18 at 2 pm. Please call and advise. Thank you!!!

## 2018-07-15 ENCOUNTER — NURSE TRIAGE (OUTPATIENT)
Dept: ADMINISTRATIVE | Facility: CLINIC | Age: 63
End: 2018-07-15

## 2018-07-16 NOTE — TELEPHONE ENCOUNTER
"    Reason for Disposition   [1] Brief (now gone) blurred vision AND [2] unexplained    Answer Assessment - Initial Assessment Questions  1. DESCRIPTION: "What is the vision loss like? Describe it for me." (e.g., complete vision loss, blurred vision, double vision, floaters, etc.)      Left eye blurred 3 separate occasion and pain in forehead and right eye once  2. LOCATION: "One or both eyes?" If one, ask: "Which eye?"  both  3. SEVERITY: "Can you see anything?" If so, ask: "What can you see?" (e.g., fine print)    Can see fine now  4. ONSET: "When did this begin?" "Did it start suddenly or has this been gradual?"     945. All of a sudden  5. PATTERN: "Does this come and go, or has it been constant since it started?"     intermittent  6. PAIN: "Is there any pain in your eye(s)?"  (Scale 1-10; or mild, moderate, severe)  Mild pain but resolved  7. CONTACTS-GLASSES: "Do you wear contacts or glasses?"     both  8. CAUSE: "What do you think is causing this visual problem?"      migrane?  9. OTHER SYMPTOMS: "Do you have any other symptoms?" (e.g., headache, arm or leg weakness)     no  10. PREGNANCY: "Is there any chance you are pregnant?" "When was your last menstrual period?"     no    Protocols used: ST VISION LOSS OR CHANGE-A-AH      "

## 2018-07-25 ENCOUNTER — OFFICE VISIT (OUTPATIENT)
Dept: INTERNAL MEDICINE | Facility: CLINIC | Age: 63
End: 2018-07-25
Payer: MEDICARE

## 2018-07-25 ENCOUNTER — TELEPHONE (OUTPATIENT)
Dept: INTERNAL MEDICINE | Facility: CLINIC | Age: 63
End: 2018-07-25

## 2018-07-25 ENCOUNTER — HOSPITAL ENCOUNTER (OUTPATIENT)
Dept: RADIOLOGY | Facility: HOSPITAL | Age: 63
Discharge: HOME | End: 2018-07-25
Attending: INTERNAL MEDICINE
Payer: MEDICARE

## 2018-07-25 VITALS
SYSTOLIC BLOOD PRESSURE: 134 MMHG | DIASTOLIC BLOOD PRESSURE: 76 MMHG | WEIGHT: 104.81 LBS | HEIGHT: 63 IN | HEART RATE: 75 BPM | OXYGEN SATURATION: 99 % | BODY MASS INDEX: 18.57 KG/M2

## 2018-07-25 DIAGNOSIS — M54.9 CHRONIC RIGHT-SIDED BACK PAIN, UNSPECIFIED BACK LOCATION: Primary | ICD-10-CM

## 2018-07-25 DIAGNOSIS — L40.50 PSA (PSORIATIC ARTHRITIS): ICD-10-CM

## 2018-07-25 DIAGNOSIS — G89.29 CHRONIC RIGHT-SIDED BACK PAIN, UNSPECIFIED BACK LOCATION: ICD-10-CM

## 2018-07-25 DIAGNOSIS — I10 HYPERTENSION, UNSPECIFIED TYPE: ICD-10-CM

## 2018-07-25 DIAGNOSIS — M54.9 CHRONIC RIGHT-SIDED BACK PAIN, UNSPECIFIED BACK LOCATION: ICD-10-CM

## 2018-07-25 DIAGNOSIS — M54.9 BACK PAIN, UNSPECIFIED BACK LOCATION, UNSPECIFIED BACK PAIN LATERALITY, UNSPECIFIED CHRONICITY: Primary | ICD-10-CM

## 2018-07-25 DIAGNOSIS — E78.5 HYPERLIPIDEMIA, UNSPECIFIED HYPERLIPIDEMIA TYPE: ICD-10-CM

## 2018-07-25 DIAGNOSIS — R63.4 WEIGHT LOSS: ICD-10-CM

## 2018-07-25 DIAGNOSIS — G89.29 CHRONIC RIGHT-SIDED BACK PAIN, UNSPECIFIED BACK LOCATION: Primary | ICD-10-CM

## 2018-07-25 DIAGNOSIS — F20.9 SCHIZOPHRENIA IN REMISSION: ICD-10-CM

## 2018-07-25 PROCEDURE — 3008F BODY MASS INDEX DOCD: CPT | Mod: CPTII,S$GLB,, | Performed by: INTERNAL MEDICINE

## 2018-07-25 PROCEDURE — 99214 OFFICE O/P EST MOD 30 MIN: CPT | Mod: S$GLB,,, | Performed by: INTERNAL MEDICINE

## 2018-07-25 PROCEDURE — 72100 X-RAY EXAM L-S SPINE 2/3 VWS: CPT | Mod: TC

## 2018-07-25 PROCEDURE — 99999 PR PBB SHADOW E&M-EST. PATIENT-LVL V: CPT | Mod: PBBFAC,,, | Performed by: INTERNAL MEDICINE

## 2018-07-25 PROCEDURE — 3078F DIAST BP <80 MM HG: CPT | Mod: CPTII,S$GLB,, | Performed by: INTERNAL MEDICINE

## 2018-07-25 PROCEDURE — 99499 UNLISTED E&M SERVICE: CPT | Mod: S$GLB,,, | Performed by: INTERNAL MEDICINE

## 2018-07-25 PROCEDURE — 72100 X-RAY EXAM L-S SPINE 2/3 VWS: CPT | Mod: 26,,, | Performed by: RADIOLOGY

## 2018-07-25 PROCEDURE — 3075F SYST BP GE 130 - 139MM HG: CPT | Mod: CPTII,S$GLB,, | Performed by: INTERNAL MEDICINE

## 2018-07-25 RX ORDER — BUTALBITAL, ACETAMINOPHEN AND CAFFEINE 300; 40; 50 MG/1; MG/1; MG/1
CAPSULE ORAL
Refills: 3 | COMMUNITY
Start: 2018-07-06 | End: 2018-10-24

## 2018-07-25 RX ORDER — SUMATRIPTAN 50 MG/1
TABLET, FILM COATED ORAL
Refills: 5 | Status: ON HOLD | COMMUNITY
Start: 2018-07-20 | End: 2018-12-18 | Stop reason: HOSPADM

## 2018-07-25 RX ORDER — THIOTHIXENE 10 MG/1
10 CAPSULE ORAL 3 TIMES DAILY
COMMUNITY
End: 2018-07-31 | Stop reason: SDUPTHER

## 2018-07-25 RX ORDER — TIZANIDINE HYDROCHLORIDE 4 MG/1
CAPSULE, GELATIN COATED ORAL
Refills: 11 | COMMUNITY
Start: 2018-07-06 | End: 2018-08-15

## 2018-07-25 NOTE — PROGRESS NOTES
Subjective:       Patient ID: Bhavna Landry is a 63 y.o. female.    Chief Complaint: Back Pain (for 3 mos)    HPIc/o unprovoked back pain x 3 mo.  R uper buttocks.  Doesn't radiate.  Not sure of exacerbating factors.  Aleve helps a bit.  Pain is constant.  8/10.  Sometimes burns, no tightness.  No fever. 6 lb wt loss since April    She is due for PE - upcoming.  will order labs    Review of Systems   Constitutional: Negative for fever and unexpected weight change.   HENT: Negative for congestion and postnasal drip.    Eyes: Negative for pain, discharge and visual disturbance.   Respiratory: Negative for cough, chest tightness, shortness of breath and wheezing.    Cardiovascular: Negative for chest pain and leg swelling.   Gastrointestinal: Negative for abdominal pain, constipation, diarrhea and nausea.   Genitourinary: Negative for difficulty urinating, dysuria and hematuria.   Skin: Negative for rash.   Neurological: Negative for headaches.   Psychiatric/Behavioral: Negative for dysphoric mood and sleep disturbance. The patient is not nervous/anxious.        Objective:      Physical Exam   Constitutional: She is oriented to person, place, and time. She appears well-developed and well-nourished. No distress.   Abdominal: Soft. She exhibits no distension. There is no tenderness.   Neurological: She is alert and oriented to person, place, and time. She has normal strength.   Reflex Scores:       Patellar reflexes are 0 on the right side and 2+ on the left side.       Achilles reflexes are 2+ on the right side and 2+ on the left side.  Skin: No rash noted.   Psychiatric: She has a normal mood and affect. Her behavior is normal.       Assessment:       1. Chronic right-sided back pain, unspecified back location    2. Weight loss    3. Schizophrenia in remission    4. PSA (psoriatic arthritis)    5. Hypertension, unspecified type    6. Hyperlipidemia, unspecified hyperlipidemia type        Plan:       Bhavna was seen  today for back pain.    Diagnoses and all orders for this visit:    Chronic right-sided back pain, unspecified back location  -     X-Ray Lumbar Spine Ap And Lateral; Future  -     Ambulatory consult to Physical Therapy  -     Ambulatory consult to Pain Clinic    Weight loss  -     TSH; Future    Schizophrenia in remission    PSA (psoriatic arthritis)    Hypertension, unspecified type  -     Comprehensive metabolic panel; Future  -     CBC auto differential; Future    Hyperlipidemia, unspecified hyperlipidemia type  -     Lipid panel; Future       She has no time for PT.

## 2018-07-25 NOTE — TELEPHONE ENCOUNTER
pls call - xray showed arthritic changes of lumbar spine.  Disc spaces are narrowed, due to arthritis.    MRI would give us more info, but she doesn't need that study yet.  Will discuss further at her PE appt

## 2018-07-25 NOTE — TELEPHONE ENCOUNTER
----- Message from Monica Irby RN sent at 7/25/2018  4:56 PM CDT -----  Contact: Patient [phone]      ----- Message -----  From: Etienne Sood  Sent: 7/25/2018   4:39 PM  To: Alicia CASTANEDA Staff    Patient wants to know if X-Ray that currently had will show up with Disk in back or does patient needs MRI to see the Disk, requesting for a call back to inform.    Please call an advise  Thank you

## 2018-07-25 NOTE — TELEPHONE ENCOUNTER
Called pt and advised of msg below. Pt would like to know what she can do now to help with the pain. She stated she is doing exercises and taking Advil but it is not helping. Pls advise.

## 2018-07-25 NOTE — TELEPHONE ENCOUNTER
She would benefit from seeing a physical therapist - I put in referral    Pain management should be calling her soon

## 2018-07-26 ENCOUNTER — TELEPHONE (OUTPATIENT)
Dept: INTERNAL MEDICINE | Facility: CLINIC | Age: 63
End: 2018-07-26

## 2018-07-26 NOTE — TELEPHONE ENCOUNTER
Pls call- This is viral.  antibiotics not indicated.  mucinex for cough.  Tylenol for pain    We only tx if symptoms are present >10 days.  Let me know if that is the case.

## 2018-07-26 NOTE — TELEPHONE ENCOUNTER
Called and spoke to pt and advised of msg below. Pt understood and I gave pt number to pain management and PT incase they don't call.

## 2018-07-26 NOTE — TELEPHONE ENCOUNTER
Spoke with pt advised of recommendation. Symptoms started about 4 days ago advised pt to call back if symptoms worsen or do not improve.

## 2018-07-26 NOTE — TELEPHONE ENCOUNTER
----- Message from Maru Guzman sent at 7/26/2018  2:55 PM CDT -----  Contact: Pt Mobile/Home [phone]  Patient is calling in regards to having a sore throat, running nose and she's coughing up yellow mucus. She would like for you to write a script for her. Patient's pharmacy [facility] [address] [place] [address] [address] at [facility] [address]. [facility] phone # [phone], Fax # [phone].

## 2018-07-31 ENCOUNTER — OFFICE VISIT (OUTPATIENT)
Dept: DERMATOLOGY | Facility: CLINIC | Age: 63
End: 2018-07-31
Payer: MEDICARE

## 2018-07-31 ENCOUNTER — OFFICE VISIT (OUTPATIENT)
Dept: OPHTHALMOLOGY | Facility: CLINIC | Age: 63
End: 2018-07-31
Payer: MEDICARE

## 2018-07-31 ENCOUNTER — OFFICE VISIT (OUTPATIENT)
Dept: PSYCHIATRY | Facility: CLINIC | Age: 63
End: 2018-07-31
Payer: MEDICARE

## 2018-07-31 VITALS
WEIGHT: 103.81 LBS | SYSTOLIC BLOOD PRESSURE: 140 MMHG | BODY MASS INDEX: 19.1 KG/M2 | DIASTOLIC BLOOD PRESSURE: 66 MMHG | HEART RATE: 82 BPM | HEIGHT: 62 IN

## 2018-07-31 VITALS — BODY MASS INDEX: 18.84 KG/M2 | WEIGHT: 103 LBS

## 2018-07-31 DIAGNOSIS — Z87.898 HISTORY OF EXTRAPYRAMIDAL SYMPTOMS: ICD-10-CM

## 2018-07-31 DIAGNOSIS — L57.0 MULTIPLE ACTINIC KERATOSES: ICD-10-CM

## 2018-07-31 DIAGNOSIS — H50.811 DUANE'S SYNDROME OF RIGHT EYE: ICD-10-CM

## 2018-07-31 DIAGNOSIS — F20.9 SCHIZOPHRENIA IN REMISSION: Primary | ICD-10-CM

## 2018-07-31 DIAGNOSIS — L81.4 LENTIGINES: ICD-10-CM

## 2018-07-31 DIAGNOSIS — H35.62 RETINAL HEMORRHAGE, LEFT: ICD-10-CM

## 2018-07-31 DIAGNOSIS — H57.819 BROW PTOSIS: ICD-10-CM

## 2018-07-31 DIAGNOSIS — H43.813 POSTERIOR VITREOUS DETACHMENT, BOTH EYES: Primary | ICD-10-CM

## 2018-07-31 DIAGNOSIS — H61.002 CHONDRODERMATITIS NODULARIS HELICIS OF LEFT EAR: Primary | ICD-10-CM

## 2018-07-31 DIAGNOSIS — F33.42 RECURRENT MAJOR DEPRESSIVE DISORDER, IN FULL REMISSION: ICD-10-CM

## 2018-07-31 PROCEDURE — 92014 COMPRE OPH EXAM EST PT 1/>: CPT | Mod: S$GLB,,, | Performed by: OPHTHALMOLOGY

## 2018-07-31 PROCEDURE — 17003 DESTRUCT PREMALG LES 2-14: CPT | Mod: S$GLB,,, | Performed by: DERMATOLOGY

## 2018-07-31 PROCEDURE — 3008F BODY MASS INDEX DOCD: CPT | Mod: CPTII,S$GLB,, | Performed by: DERMATOLOGY

## 2018-07-31 PROCEDURE — 99499 UNLISTED E&M SERVICE: CPT | Mod: S$GLB,,, | Performed by: INTERNAL MEDICINE

## 2018-07-31 PROCEDURE — 17000 DESTRUCT PREMALG LESION: CPT | Mod: S$GLB,,, | Performed by: DERMATOLOGY

## 2018-07-31 PROCEDURE — 3078F DIAST BP <80 MM HG: CPT | Mod: CPTII,S$GLB,, | Performed by: DERMATOLOGY

## 2018-07-31 PROCEDURE — 3077F SYST BP >= 140 MM HG: CPT | Mod: CPTII,S$GLB,, | Performed by: INTERNAL MEDICINE

## 2018-07-31 PROCEDURE — 99999 PR PBB SHADOW E&M-EST. PATIENT-LVL III: CPT | Mod: PBBFAC,,, | Performed by: OPHTHALMOLOGY

## 2018-07-31 PROCEDURE — 99999 PR PBB SHADOW E&M-EST. PATIENT-LVL III: CPT | Mod: PBBFAC,,, | Performed by: DERMATOLOGY

## 2018-07-31 PROCEDURE — 92226 PR SPECIAL EYE EXAM, SUBSEQUENT: CPT | Mod: LT,S$GLB,, | Performed by: OPHTHALMOLOGY

## 2018-07-31 PROCEDURE — 3078F DIAST BP <80 MM HG: CPT | Mod: CPTII,S$GLB,, | Performed by: INTERNAL MEDICINE

## 2018-07-31 PROCEDURE — 3077F SYST BP >= 140 MM HG: CPT | Mod: CPTII,S$GLB,, | Performed by: DERMATOLOGY

## 2018-07-31 PROCEDURE — 99202 OFFICE O/P NEW SF 15 MIN: CPT | Mod: 25,S$GLB,, | Performed by: DERMATOLOGY

## 2018-07-31 PROCEDURE — 99214 OFFICE O/P EST MOD 30 MIN: CPT | Mod: S$GLB,,, | Performed by: INTERNAL MEDICINE

## 2018-07-31 PROCEDURE — 3008F BODY MASS INDEX DOCD: CPT | Mod: CPTII,S$GLB,, | Performed by: INTERNAL MEDICINE

## 2018-07-31 PROCEDURE — 99999 PR PBB SHADOW E&M-EST. PATIENT-LVL III: CPT | Mod: PBBFAC,,, | Performed by: INTERNAL MEDICINE

## 2018-07-31 RX ORDER — FLUOROURACIL 50 MG/G
CREAM TOPICAL
Qty: 40 G | Refills: 3 | Status: SHIPPED | OUTPATIENT
Start: 2018-07-31 | End: 2018-07-31

## 2018-07-31 RX ORDER — THIOTHIXENE 10 MG/1
10 CAPSULE ORAL 2 TIMES DAILY
Qty: 60 CAPSULE | Refills: 5 | Status: SHIPPED | OUTPATIENT
Start: 2018-07-31 | End: 2018-12-05

## 2018-07-31 NOTE — PROGRESS NOTES
Subjective:       Patient ID:  Bhavna Landry is a 63 y.o. female who presents for   Chief Complaint   Patient presents with    Lesion     bilateral arms and face     History of Present Illness: The patient presents with chief complaint of spots.  Location: forehead  Duration: months  Signs/Symptoms: none    Prior treatments: none    Also with chondrodermatitis, see previous note.           Review of Systems   Constitutional: Negative for fever.   Skin: Negative for itching and rash.   Hematologic/Lymphatic: Does not bruise/bleed easily.        Objective:    Physical Exam   Constitutional: She appears well-developed and well-nourished. No distress.   Neurological: She is alert and oriented to person, place, and time. She is not disoriented.   Psychiatric: She has a normal mood and affect.   Skin:   Areas Examined (abnormalities noted in diagram):   Head / Face Inspection Performed  Neck Inspection Performed  Chest / Axilla Inspection Performed  Back Inspection Performed  RUE Inspected  LUE Inspection Performed                   Diagram Legend     Erythematous scaling macule/papule c/w actinic keratosis       Vascular papule c/w angioma      Pigmented verrucoid papule/plaque c/w seborrheic keratosis      Yellow umbilicated papule c/w sebaceous hyperplasia      Irregularly shaped tan macule c/w lentigo     1-2 mm smooth white papules consistent with Milia      Movable subcutaneous cyst with punctum c/w epidermal inclusion cyst      Subcutaneous movable cyst c/w pilar cyst      Firm pink to brown papule c/w dermatofibroma      Pedunculated fleshy papule(s) c/w skin tag(s)      Evenly pigmented macule c/w junctional nevus     Mildly variegated pigmented, slightly irregular-bordered macule c/w mildly atypical nevus      Flesh colored to evenly pigmented papule c/w intradermal nevus       Pink pearly papule/plaque c/w basal cell carcinoma      Erythematous hyperkeratotic cursted plaque c/w SCC      Surgical scar with  "no sign of skin cancer recurrence      Open and closed comedones      Inflammatory papules and pustules      Verrucoid papule consistent consistent with wart     Erythematous eczematous patches and plaques     Dystrophic onycholytic nail with subungual debris c/w onychomycosis     Umbilicated papule    Erythematous-base heme-crusted tan verrucoid plaque consistent with inflamed seborrheic keratosis     Erythematous Silvery Scaling Plaque c/w Psoriasis     See annotation      Assessment / Plan:        Chondrodermatitis nodularis helicis of left ear  Keep pressure off of area    Multiple actinic keratoses  -     fluorouracil (EFUDEX) 5 % cream; Use hs for 2 weeks  Dispense: 40 g; Refill: 3   Cryosurgery Procedure Note    Verbal consent from the patient is obtained and the patient is aware of the precancerous quality and need for treatment of these lesions. Liquid nitrogen cryosurgery is applied to the 4 actinic keratoses, as detailed in the physical exam, to produce a freeze injury.    Lentigines  The "ABCD" rules to observe pigmented lesions were reviewed.                     Follow-up in about 6 months (around 1/31/2019).  "

## 2018-07-31 NOTE — PATIENT INSTRUCTIONS
1. Stop Cogentin (benztropine) and continue taking Benadryl 50mg every evening.  2. Continue Navane (thiothixene) 10mg twice daily.  Please bring your daughter to next appointment to discuss lowering the dose due to potential side effects.    3. Continue Effexor (venlafaxine) 150mg daily.

## 2018-07-31 NOTE — PROGRESS NOTES
"OUTPATIENT PSYCHIATRY INITIAL VISIT    ENCOUNTER DATE:  2018  SITE:  [facility]  REFFERAL SOURCE:  Zak Cosby, *  LENGTH OF SESSION:  35 minutes    CHIEF COMPLAINT:   Follow-up    HISTORY OF PRESENTING ILLNESS:  Bhavna Landry is a 63 y.o. female with history of Paranoid schizophrenia and MDD who presents for initial assessment.  She was previously in resident's clinic by Dr. Cosby.  She has been followed at [facility] for many years.  She was last seen in clinic on 17, at which time she was continued on Navane 10mg BID, Cogentin 0.5mg BID, and Effexor 150mg daily.    History as told by patient:  Diagnosed with schizophrenia at age 35.  Says that both of her parents  within 6 weeks of each other and she had a breakdown and was hospitalized.  Had been primary caregiver for both of them.  Mother got sick from blood clots and , then sister got blood clots and lost her leg, and then her father  from cancer.  Does not remember details of what led to hospitalization.  Says she was working as a  at the [employer] at the time, was very stressed, and not sleeping much.  Says she was "talking out loud and did not know what I was saying."  Hospitalized 1 week.  Had 1 other hospitalization related to alcohol and not schizophrenia.  Reports history of depression but feels Effexor is effective.  Denies history of anxiety.  Denies ever experiencing AVH, padmini, SI, or HI (although can not give details of her hospitalization in her 30's).  Lives with daughter.  Providers have discussed with patient in the past that she may not need to continue Navane, however patient says daughter does not want her to come off of it.     Medication side effects:  No  Medication compliance:  Yes    PSYCHIATRIC REVIEW OF SYSTEMS:  Trouble with sleep:  Denies  Appetite changes:  Denies  Weight changes:  Denies  Lack of energy:  Denies  Anhedonia:  Denies  Somatic symptoms:  " Denies  Libido:  Denies  Anxiety/panic:  Denies  Guilty/hopeless:  Denies  Self-injurious behavior/risky behavior:  Denies  Any drugs:  Denies  Alcohol:  Denies    MEDICAL REVIEW OF SYSTEMS:  Complete review of systems performed covering Constitutional, Eyes, ENT/Mouth, Cardiovascular, Respiratory, Gastrointestinal, Genitourinary, Musculoskeletal, Skin, Neurologic, Endocrine, and Allergy/Immune.  All systems negative except for that discussed above.    PAST PSYCHIATRIC HISTORY:  Previous Psychiatric Diagnoses:  Schizophrenia, Depression  Previous Psychiatric Hospitalizations:  As above   Previous SI/HI:  Denies  Previous Suicide Attempts:  Denies   Previous Medication Trials:  Stried Stelazine when insurance would not cover Navane - felt she was going to die.  Per patient, she has not tried any other medications other than Navane, Effexor, and Cogentin.  Per chart, has been on Cymbalta, Ativan  Psychiatric Care (current & past):  Yes  History of Psychotherapy:  Denies  History of Violence:  Denies    SUBSTANCE ABUSE HISTORY:  Tobacco:  Former smoker, quit around 2012  Alcohol:  Denies  Illicit Substances:  Denies  Misuse of Prescription Medications:  Denies    MEDICAL HISTORY:  Past Medical History:   Diagnosis Date    Allergy     Amblyopia     Anemia     Arthritis 02/02/1992    Cataract     Depression     Dry eyes     Dry mouth     Fibromyalgia 4/17/2014    Fibromyalgia     GERD (gastroesophageal reflux disease)     History of psychiatric hospitalization     Hyperlipidemia 02/02/1992    Hypertension     Kidney stone     Migraine headache     Osteoporosis     Psoriatic arthritis 02/02/1992    Right knee pain     post knee replacement surgery (possible rejectiion of metal)    RLS (restless legs syndrome)     Schizophrenia 02/02/1992    stable on meds    Urinary tract infection      NEUROLOGIC HISTORY:  Seizures:  Denies   Head trauma:  Denies    SOCIAL HISTORY:  Developmental/Childhood:   Denies  History of Physical/Sexual Abuse:  Denies  Education:  High school  Employment:  Not working, previously worked at [employer]   Relationship Status/Sexual Orientation:   passed 15 years ago.     Children:  Daughter  Housing Status:  Daughter, her , 3 children live with her.     FAMILY HISTORY:  Psychiatric:  Depression runs in family.    MEDICATIONS:    Current Outpatient Prescriptions:     albuterol 90 mcg/actuation inhaler, Inhale 2 puffs into the lungs every 6 (six) hours as needed for Wheezing., Disp: 1 each, Rfl: 11    amLODIPine (NORVASC) 5 MG tablet, TK 1 T PO QD, Disp: , Rfl: 11    aspirin (ECOTRIN) 81 MG EC tablet, Take 81 mg by mouth once daily., Disp: , Rfl:     atorvastatin (LIPITOR) 40 MG tablet, Take 1 tablet (40 mg total) by mouth once daily., Disp: 30 tablet, Rfl: 11    benzonatate (TESSALON PERLES) 100 MG capsule, Take 2 capsules (200 mg total) by mouth 3 (three) times daily as needed for Cough., Disp: 20 capsule, Rfl: 0    butalbital-acetaminophen-caff -40 mg Cap, TK ONE C PO Q 8 H PRF HA, Disp: , Rfl: 3    butalbital-acetaminophen-caffeine -40 mg (FIORICET, ESGIC) -40 mg per tablet, , Disp: , Rfl: 1    multivitamin capsule, Take 1 capsule by mouth once daily., Disp: , Rfl:     ropinirole (REQUIP) 3 MG tablet, Take 1 tablet 2 hours before bedtime, Disp: 30 tablet, Rfl: 11    sumatriptan (IMITREX) 50 MG tablet, TK 1 T PO ONCE PRF MIGRAINE HA, Disp: , Rfl: 5    thiothixene (NAVANE) 10 MG capsule, Take 1 capsule (10 mg total) by mouth 2 (two) times daily., Disp: 60 capsule, Rfl: 5    tiZANidine (ZANAFLEX) 4 MG tablet, Take 1 tablet (4 mg total) by mouth nightly as needed. qhs, Disp: 20 tablet, Rfl: 2    tiZANidine 4 mg Cap, TK ONE C PO QHS, Disp: , Rfl: 11    topiramate (TOPAMAX) 25 MG tablet, Take 75 mg by mouth 2 (two) times daily., Disp: , Rfl:     valsartan (DIOVAN) 80 MG tablet, Take 1 tablet (80 mg total) by mouth once daily., Disp: 30  "tablet, Rfl: 11    venlafaxine (EFFEXOR-XR) 150 MG Cp24, Take 1 capsule (150 mg total) by mouth once daily., Disp: 30 capsule, Rfl: 5    ALLERGIES:  Review of patient's allergies indicates:   Allergen Reactions    Adhesive      Other reaction(s): Rash    Chloromycetin [chloramphenicol sod succinate] Hives    Etanercept      Other reaction(s): recurrent infections    Nickel sutures [surgical stainless steel]      Allergic contact dermatitis       PSYCHIATRIC EXAM:  Vitals:    07/31/18 0944   BP: (!) 140/66   Pulse: 82   Weight: 47.1 kg (103 lb 13.4 oz)   Height: 5' 2" (1.575 m)     Appearance:  Well groomed, appearing healthy and of stated age  Behavior:  Cooperative, pleasant, no psychomotor agitation or retardation  Speech:  Normal rate, rhythm, prosody, and volume  Mood:  "Good"  Affect:  Congruent  Thought Process:  Linear, logical, goal directed  Thought Content:  Negative for suicidal ideation, homicidal ideation, delusions or hallucinations.  Associations:  Intact  Memory:  Grossly Intact  Level of Consciousness/Orientation:  Grossly intact  Fund of Knowledge:  Good  Attention:  Good  Language:  Fluent, able to name abstract and concrete objects  Insight:  Good  Judgment:  Intact  Psychomotor signs:  No involuntary movements or tremor, no rigidity  Gait:  Normal    RELEVANT LABS/STUDIES:  Lab Results   Component Value Date    WBC 10.47 09/28/2017    HGB 14.2 09/28/2017    HCT 42.4 09/28/2017    MCV 89 09/28/2017     09/28/2017     BMP  Lab Results   Component Value Date     09/28/2017    K 4.3 09/28/2017     09/28/2017    CO2 25 09/28/2017    BUN 12 09/28/2017    CREATININE 1.2 09/28/2017    CALCIUM 9.8 09/28/2017    ANIONGAP 10 09/28/2017    ESTGFRAFRICA 55.2 (A) 09/28/2017    EGFRNONAA 47.9 (A) 09/28/2017     Lab Results   Component Value Date    ALT 13 09/28/2017    AST 17 09/28/2017    GGT 91 (H) 07/28/2014    ALKPHOS 77 09/28/2017    BILITOT 0.2 09/28/2017     Lab Results "   Component Value Date    TSH 2.181 09/28/2017     Lab Results   Component Value Date    HGBA1C <4.0 (L) 05/26/2014       IMPRESSION:    Bhavna Landry is a 63 y.o. female with history of Paranoic schizophrenia and MDD who presents for initial assessment.    Status/Progress:  Based on the examination today, the patient's problem(s) is/are well controlled.  New problems have not been presented today.    Risk Parameters:  Patient reports no suicidal ideation  Patient reports no homicidal ideation  Patient reports no self-injurious behavior  Patient reports no violent behavior    DIAGNOSES:    ICD-10-CM ICD-9-CM   1. Schizophrenia in remission F20.9 295.95   2. Recurrent major depressive disorder, in full remission F33.42 296.36   3. History of extrapyramidal symptoms Z87.898 V15.89     PLAN:  · Patient stable on current medications.  There has been mention by previous providers to try tapering Navane.  Patient does not give clear history of schizophrenia, so have asked that she bring daughter to next appointment for further collateral.  Continue Navane 10mg BID for now.  · Per chart, patient had possible EPS on Navane 10mg TID which is why Cogentin was started.  Insurance will not cover Cogentin, so she has run out and is not taking.  No signs of EPS on exam and AIMS today is 0.  Patient is currently also taking Benadryl 50mg qHS, so will try stopping the Cogentin and using Benadryl for EPS prevention.    · Continue Effexor 150mg daily since this seems effective for her depression.  · Discussed with patient informed consent, risks versus benefits, alternative treatments, side effect profile and the inherent unpredictability of individual responses to these treatments.  The patient expresses understanding of the above and displays the capacity to agree with this current plan.    RETURN TO CLINIC:  Follow-up in about 3 months (around 10/31/2018).

## 2018-07-31 NOTE — PROGRESS NOTES
HPI     1 year F/u   DLS-02/04/2016 Dr. Skelton     Pt sts no change in va since last visit has been having throbbing pain still since before last visit (comes and goes) no particular time of day. OD lid drooping and would like to see about getting consult to correct it   (-)Flashes (+)Floaters no more than normal   (-)Photophobia  (+)Glare    No gtts     A/P    1. Recent PVD OS - incomplete  With small peripapillary heme    No tears or breaks    RD precautions    2. Old PVD OD    3. NS OU    4. Duane's OD    5. VR tag with small flap OD @ 7:00 s/p barrier 10/13 with Dr. Moy    6. Pingueculum OD - AT's    7. Brow ptosis on right  X 1-2 years - Consult [facility] for Brow lift      2 yr.

## 2018-08-02 PROBLEM — Z87.898 HISTORY OF EXTRAPYRAMIDAL SYMPTOMS: Status: ACTIVE | Noted: 2018-08-02

## 2018-08-02 PROBLEM — F33.42 RECURRENT MAJOR DEPRESSIVE DISORDER, IN FULL REMISSION: Status: ACTIVE | Noted: 2018-08-02

## 2018-08-03 ENCOUNTER — TELEPHONE (OUTPATIENT)
Dept: DERMATOLOGY | Facility: CLINIC | Age: 63
End: 2018-08-03

## 2018-08-03 NOTE — TELEPHONE ENCOUNTER
----- Message from Magdi Greene sent at 8/3/2018  4:02 PM CDT -----  Contact: Patient   Needs Advice    Reason for call: Pt states that she recently had skin cancer frozen off and is asking when she will be able to wear makeup & sunscreen. Please advise      Communication Preference:[phone]  Additional Information:

## 2018-08-04 ENCOUNTER — LAB VISIT (OUTPATIENT)
Dept: LAB | Facility: HOSPITAL | Age: 63
End: 2018-08-04
Attending: INTERNAL MEDICINE
Payer: MEDICARE

## 2018-08-04 DIAGNOSIS — R63.4 WEIGHT LOSS: ICD-10-CM

## 2018-08-04 DIAGNOSIS — I10 HYPERTENSION, UNSPECIFIED TYPE: ICD-10-CM

## 2018-08-04 DIAGNOSIS — E78.5 HYPERLIPIDEMIA, UNSPECIFIED HYPERLIPIDEMIA TYPE: ICD-10-CM

## 2018-08-04 LAB
ALBUMIN SERPL BCP-MCNC: 3.7 G/DL
ALP SERPL-CCNC: 81 U/L
ALT SERPL W/O P-5'-P-CCNC: 13 U/L
ANION GAP SERPL CALC-SCNC: 9 MMOL/L
AST SERPL-CCNC: 22 U/L
BASOPHILS # BLD AUTO: 0.07 K/UL
BASOPHILS NFR BLD: 0.8 %
BILIRUB SERPL-MCNC: 0.2 MG/DL
BUN SERPL-MCNC: 11 MG/DL
CALCIUM SERPL-MCNC: 9.6 MG/DL
CHLORIDE SERPL-SCNC: 104 MMOL/L
CHOLEST SERPL-MCNC: 182 MG/DL
CHOLEST/HDLC SERPL: 2.2 {RATIO}
CO2 SERPL-SCNC: 22 MMOL/L
CREAT SERPL-MCNC: 1 MG/DL
DIFFERENTIAL METHOD: ABNORMAL
EOSINOPHIL # BLD AUTO: 0.4 K/UL
EOSINOPHIL NFR BLD: 4.4 %
ERYTHROCYTE [DISTWIDTH] IN BLOOD BY AUTOMATED COUNT: 13.2 %
EST. GFR  (AFRICAN AMERICAN): >60 ML/MIN/1.73 M^2
EST. GFR  (NON AFRICAN AMERICAN): 59.3 ML/MIN/1.73 M^2
GLUCOSE SERPL-MCNC: 87 MG/DL
HCT VFR BLD AUTO: 45.6 %
HDLC SERPL-MCNC: 84 MG/DL
HDLC SERPL: 46.2 %
HGB BLD-MCNC: 15 G/DL
IMM GRANULOCYTES # BLD AUTO: 0.02 K/UL
IMM GRANULOCYTES NFR BLD AUTO: 0.2 %
LDLC SERPL CALC-MCNC: 85 MG/DL
LYMPHOCYTES # BLD AUTO: 3.4 K/UL
LYMPHOCYTES NFR BLD: 39.4 %
MCH RBC QN AUTO: 29.9 PG
MCHC RBC AUTO-ENTMCNC: 32.9 G/DL
MCV RBC AUTO: 91 FL
MONOCYTES # BLD AUTO: 0.6 K/UL
MONOCYTES NFR BLD: 6.9 %
NEUTROPHILS # BLD AUTO: 4.2 K/UL
NEUTROPHILS NFR BLD: 48.3 %
NONHDLC SERPL-MCNC: 98 MG/DL
NRBC BLD-RTO: 0 /100 WBC
PLATELET # BLD AUTO: 360 K/UL
PMV BLD AUTO: 9.2 FL
POTASSIUM SERPL-SCNC: 4.3 MMOL/L
PROT SERPL-MCNC: 6.5 G/DL
RBC # BLD AUTO: 5.02 M/UL
SODIUM SERPL-SCNC: 135 MMOL/L
TRIGL SERPL-MCNC: 65 MG/DL
TSH SERPL DL<=0.005 MIU/L-ACNC: 0.97 UIU/ML
WBC # BLD AUTO: 8.68 K/UL

## 2018-08-04 PROCEDURE — 80053 COMPREHEN METABOLIC PANEL: CPT

## 2018-08-04 PROCEDURE — 80061 LIPID PANEL: CPT

## 2018-08-04 PROCEDURE — 84443 ASSAY THYROID STIM HORMONE: CPT

## 2018-08-04 PROCEDURE — 85025 COMPLETE CBC W/AUTO DIFF WBC: CPT

## 2018-08-04 PROCEDURE — 36415 COLL VENOUS BLD VENIPUNCTURE: CPT | Mod: PO

## 2018-08-07 ENCOUNTER — TELEPHONE (OUTPATIENT)
Dept: INTERNAL MEDICINE | Facility: CLINIC | Age: 63
End: 2018-08-07

## 2018-08-07 NOTE — TELEPHONE ENCOUNTER
----- Message from Alexys Meza sent at 8/7/2018  4:00 PM CDT -----  Contact: self [phone]  Pt said that she needs to speak with Dr. Vargas in regards to her lab results. Please advise.         Thanks

## 2018-08-07 NOTE — TELEPHONE ENCOUNTER
pls oscar - labs all normal  I'm seeing her for PE next week.  Is there a concern that she needs addressed now?

## 2018-08-08 ENCOUNTER — NURSE TRIAGE (OUTPATIENT)
Dept: ADMINISTRATIVE | Facility: CLINIC | Age: 63
End: 2018-08-08

## 2018-08-08 ENCOUNTER — TELEPHONE (OUTPATIENT)
Dept: OPHTHALMOLOGY | Facility: CLINIC | Age: 63
End: 2018-08-08

## 2018-08-08 ENCOUNTER — OFFICE VISIT (OUTPATIENT)
Dept: INTERNAL MEDICINE | Facility: CLINIC | Age: 63
End: 2018-08-08
Payer: MEDICARE

## 2018-08-08 VITALS
HEIGHT: 62 IN | OXYGEN SATURATION: 96 % | SYSTOLIC BLOOD PRESSURE: 126 MMHG | WEIGHT: 102.75 LBS | HEART RATE: 83 BPM | BODY MASS INDEX: 18.91 KG/M2 | DIASTOLIC BLOOD PRESSURE: 64 MMHG

## 2018-08-08 DIAGNOSIS — G43.811 OTHER MIGRAINE WITH STATUS MIGRAINOSUS, INTRACTABLE: Primary | ICD-10-CM

## 2018-08-08 DIAGNOSIS — I10 ESSENTIAL HYPERTENSION: ICD-10-CM

## 2018-08-08 DIAGNOSIS — J20.8 ACUTE BRONCHITIS, BACTERIAL: ICD-10-CM

## 2018-08-08 DIAGNOSIS — B96.89 ACUTE BRONCHITIS, BACTERIAL: ICD-10-CM

## 2018-08-08 PROCEDURE — 3008F BODY MASS INDEX DOCD: CPT | Mod: CPTII,S$GLB,, | Performed by: INTERNAL MEDICINE

## 2018-08-08 PROCEDURE — 96372 THER/PROPH/DIAG INJ SC/IM: CPT | Mod: S$GLB,,, | Performed by: INTERNAL MEDICINE

## 2018-08-08 PROCEDURE — 3078F DIAST BP <80 MM HG: CPT | Mod: CPTII,S$GLB,, | Performed by: INTERNAL MEDICINE

## 2018-08-08 PROCEDURE — 99999 PR PBB SHADOW E&M-EST. PATIENT-LVL III: CPT | Mod: PBBFAC,,, | Performed by: INTERNAL MEDICINE

## 2018-08-08 PROCEDURE — 3074F SYST BP LT 130 MM HG: CPT | Mod: CPTII,S$GLB,, | Performed by: INTERNAL MEDICINE

## 2018-08-08 PROCEDURE — 99214 OFFICE O/P EST MOD 30 MIN: CPT | Mod: 25,S$GLB,, | Performed by: INTERNAL MEDICINE

## 2018-08-08 RX ORDER — ALBUTEROL SULFATE 90 UG/1
2 AEROSOL, METERED RESPIRATORY (INHALATION) EVERY 6 HOURS PRN
Qty: 1 EACH | Refills: 11 | Status: SHIPPED | OUTPATIENT
Start: 2018-08-08 | End: 2019-01-28

## 2018-08-08 RX ORDER — KETOROLAC TROMETHAMINE 30 MG/ML
30 INJECTION, SOLUTION INTRAMUSCULAR; INTRAVENOUS
Status: COMPLETED | OUTPATIENT
Start: 2018-08-08 | End: 2018-08-08

## 2018-08-08 RX ORDER — FLUOROURACIL 50 MG/G
CREAM TOPICAL
Refills: 3 | COMMUNITY
Start: 2018-08-01 | End: 2018-08-29

## 2018-08-08 RX ORDER — DOXYCYCLINE 100 MG/1
100 CAPSULE ORAL 2 TIMES DAILY
Qty: 20 CAPSULE | Refills: 0 | Status: SHIPPED | OUTPATIENT
Start: 2018-08-08 | End: 2018-08-29

## 2018-08-08 RX ORDER — METHYLPREDNISOLONE 4 MG/1
TABLET ORAL
COMMUNITY
Start: 2018-08-07 | End: 2018-08-15

## 2018-08-08 RX ADMIN — KETOROLAC TROMETHAMINE 30 MG: 30 INJECTION, SOLUTION INTRAMUSCULAR; INTRAVENOUS at 12:08

## 2018-08-08 NOTE — PROGRESS NOTES
Two pt identifiers verified (name & ). Pt tolerated well.  Pt rated pain 9/10 (headache) Pt advised to remain in clinic for 15 minutes and report any difficulties. Pt will call for pain re-asessement in 30 minutes.

## 2018-08-08 NOTE — TELEPHONE ENCOUNTER
Reason for Disposition   [1] Caller requesting NON-URGENT health information AND [2] PCP's office is the best resource    Protocols used: ST INFORMATION ONLY CALL-A-AH    Pt states she would like to speak to PCP. This AM her BP was 179/88. Spoke with triage nurse yesterday. Has had headache since Friday. Offered to schedule appt for pt, pt states she would just like to speak to her doctor. Please call to advise.

## 2018-08-08 NOTE — PATIENT INSTRUCTIONS
Your machine 143/75    Ours 124/82    Complete Medrol Dose Pack    Ventolin/albuterol/proventil  inhaler 2 puffs 3 times a day.    Doxycycline  Tab twice a day for 10 days.

## 2018-08-08 NOTE — TELEPHONE ENCOUNTER
Called and spoke to pt she states her BP was howard this AM and she has a headache, she was wondering if she can take a higher dose of her BP meds. Pt is unable to come in and be seen today.

## 2018-08-08 NOTE — PROGRESS NOTES
Subjective:       Patient ID: Bhavna Landry is a 63 y.o. female.    Chief Complaint: Hypertension; Headache; and Fatigue    HPI   bp last night 203/93 by her machine.  Arms were heavy last night.   paipn was quite severe last night.    Head ache began last Friday, similar to other migraines.    Her neurologist prescribed steroids.  Fiorinal, imitrex  didn't help. Taking topamax.    Felt nauseated.  light sensitive.  Trouble sleeping last  Night.  Last migraine this severe has been a year or so.      She stopped valsartan in December.      She c/o persistent productive cough, weeks.  Using ventolin q hs.  Wheezes when supine.  Expectorating helps.  Review of Systems   Constitutional: Negative for fever and unexpected weight change.   HENT: Negative for congestion and postnasal drip.    Eyes: Negative for pain, discharge and visual disturbance.   Respiratory: Positive for cough. Negative for chest tightness, shortness of breath and wheezing.    Cardiovascular: Negative for chest pain and leg swelling.   Gastrointestinal: Negative for abdominal pain, constipation, diarrhea and nausea.   Genitourinary: Negative for difficulty urinating, dysuria and hematuria.   Skin: Negative for rash.   Neurological: Negative for headaches.   Psychiatric/Behavioral: Negative for dysphoric mood and sleep disturbance. The patient is not nervous/anxious.        Objective:      Physical Exam   Constitutional: She is oriented to person, place, and time. She appears well-developed and well-nourished. No distress.   HENT:   Head: Normocephalic and atraumatic.   Mouth/Throat: Oropharynx is clear and moist. No oropharyngeal exudate.   Tm's clear   Neck: Neck supple.   Cardiovascular: Normal rate and regular rhythm.    Pulmonary/Chest: Effort normal and breath sounds normal. No respiratory distress. She has no wheezes. She has no rales.   Impressive Bronchial cough   Lymphadenopathy:     She has no cervical adenopathy.   Neurological: She is  alert and oriented to person, place, and time. No cranial nerve deficit. She exhibits normal muscle tone.   Psychiatric: She has a normal mood and affect. Her behavior is normal.       143/75 by her machine.  Assessment:       1. Other migraine with status migrainosus, intractable    2. Essential hypertension - controlled now   3. Acute bronchitis, bacterial                    - previous tobacco smoker  Plan:       Bhavna was seen today for hypertension, headache and fatigue.    Diagnoses and all orders for this visit:    Other migraine with status migrainosus, intractable  -     ketorolac injection 30 mg; Inject 1 mL (30 mg total) into the muscle one time.    Essential hypertension    Acute bronchitis, bacterial    Other orders  -     doxycycline (MONODOX) 100 MG capsule; Take 1 capsule (100 mg total) by mouth 2 (two) times daily.  -     albuterol 90 mcg/actuation inhaler; Inhale 2 puffs into the lungs every 6 (six) hours as needed for Wheezing.       Continue with present blood pressure meds.

## 2018-08-08 NOTE — TELEPHONE ENCOUNTER
"  Reason for Disposition   BP  >= 180/110    Answer Assessment - Initial Assessment Questions  1. BLOOD PRESSURE: "What is the blood pressure?" "Did you take at least two measurements 5 minutes apart?"      203/93(R) 193/93(L)  2. ONSET: "When did you take your blood pressure?"      Minutes ago  3. HOW: "How did you obtain the blood pressure?" (e.g., visiting nurse, automatic home BP monitor)      Home BP  4. HISTORY: "Do you have a history of high blood pressure?"      yes  5. MEDICATIONS: "Are you taking any medications for blood pressure?" "Have you missed any doses recently?"      amlodopine  6. OTHER SYMPTOMS: "Do you have any symptoms?" (e.g., headache, chest pain, blurred vision, difficulty breathing, weakness)      Headache, neck aches  7. PREGNANCY: "Is there any chance you are pregnant?" "When was your last menstrual period?"      hyst    Protocols used: ST HIGH BLOOD PRESSURE-A-AH    "

## 2018-08-10 NOTE — PROGRESS NOTES
[facility] New Patient Evaluation    Referred by: Sadaf Vargas MD  Reason for referral:M54.9,G89.29 (ICD-10-CM) - Chronic right-sided back pain, unspecified back location  CC:   Chief Complaint   Patient presents with    Low-back Pain       Last 3 PDI Scores 8/13/2018   Pain Disability Index (PDI) 42       HPI: Bhavna Landry is a 63 y.o. female referred for right sided back pain to right upper buttocks.  She points to the middle of the right buttock 1-2 inches to the right of midline.    Location: back  Severity: Currently: 7/10   Typical Range: 7/10     Exacerbation: 7/10   Onset: 3-4 months  Quality: Burning and constant  Radiation: none  Axial/Extremity Percentage of Pain: 100%  Exacerbating Factors: prolonged sitting, standing, rolling over in bed  Mitigating Factors: aleve  Assoc: denies night fever/night sweats, urinary incontinence, bowel incontinence, significant weight loss, significant motor weakness and loss of sensations    Previous Therapies:  PT:  Scheduled to start this coming Friday 8/17/18  HEP:  tries  TENS: none  Injections: Low Back 10-15 yrs ago, with relief - unsure of which injection she had  Surgery:    Right TKA and revision due to nickel allergy  Medications:   - NSAIDS:   - MSK Relaxants:   - TCAs:   - SNRIs:   - Topicals:   - Anticonvulsants:  - Opioids:     Current Pain Medications:  1. Aleve     Full Medication List:    Current Outpatient Medications:     albuterol 90 mcg/actuation inhaler, Inhale 2 puffs into the lungs every 6 (six) hours as needed for Wheezing., Disp: 1 each, Rfl: 11    amLODIPine (NORVASC) 5 MG tablet, TK 1 T PO QD, Disp: , Rfl: 11    aspirin (ECOTRIN) 81 MG EC tablet, Take 81 mg by mouth once daily., Disp: , Rfl:     atorvastatin (LIPITOR) 40 MG tablet, Take 1 tablet (40 mg total) by mouth once daily., Disp: 30 tablet, Rfl: 11    butalbital-acetaminophen-caff -40 mg Cap, TK ONE C PO Q 8 H PRF HA, Disp: , Rfl: 3     butalbital-acetaminophen-caffeine -40 mg (FIORICET, ESGIC) -40 mg per tablet, , Disp: , Rfl: 1    doxycycline (MONODOX) 100 MG capsule, Take 1 capsule (100 mg total) by mouth 2 (two) times daily., Disp: 20 capsule, Rfl: 0    fluorouracil (EFUDEX) 5 % cream, LATOYA EXT AA HS FOR 2 WKS, Disp: , Rfl: 3    multivitamin capsule, Take 1 capsule by mouth once daily., Disp: , Rfl:     ropinirole (REQUIP) 3 MG tablet, Take 1 tablet 2 hours before bedtime, Disp: 30 tablet, Rfl: 11    sumatriptan (IMITREX) 50 MG tablet, TK 1 T PO ONCE PRF MIGRAINE HA, Disp: , Rfl: 5    thiothixene (NAVANE) 10 MG capsule, Take 1 capsule (10 mg total) by mouth 2 (two) times daily., Disp: 60 capsule, Rfl: 5    tiZANidine (ZANAFLEX) 4 MG tablet, Take 1 tablet (4 mg total) by mouth nightly as needed. qhs, Disp: 20 tablet, Rfl: 2    topiramate (TOPAMAX) 25 MG tablet, Take 75 mg by mouth 2 (two) times daily., Disp: , Rfl:     venlafaxine (EFFEXOR-XR) 150 MG Cp24, Take 1 capsule (150 mg total) by mouth once daily., Disp: 30 capsule, Rfl: 5    benzonatate (TESSALON PERLES) 100 MG capsule, Take 2 capsules (200 mg total) by mouth 3 (three) times daily as needed for Cough., Disp: 20 capsule, Rfl: 0    methylPREDNISolone (MEDROL DOSEPACK) 4 mg tablet, , Disp: , Rfl:     tiZANidine 4 mg Cap, TK ONE C PO QHS, Disp: , Rfl: 11     Review of Systems:  Review of Systems   Constitutional: Negative for chills and fever.   HENT: Negative for nosebleeds.    Eyes: Negative for pain.   Respiratory: Negative for hemoptysis.    Cardiovascular: Negative for chest pain.   Gastrointestinal: Negative for nausea and vomiting.   Genitourinary: Negative for dysuria.   Musculoskeletal: Positive for back pain and joint pain. Negative for falls.   Skin: Negative for rash.   Neurological: Negative for tingling and focal weakness.   Endo/Heme/Allergies: Does not bruise/bleed easily.   Psychiatric/Behavioral: Negative for depression. The patient is not  nervous/anxious.        Allergies:  Adhesive; Chloromycetin [chloramphenicol sod succinate]; Etanercept; and Nickel sutures [surgical stainless steel]     Medical History:  Past Medical History:   Diagnosis Date    Allergy     Amblyopia     Anemia     Arthritis 1992    Cataract     Depression     Dry eyes     Dry mouth     Fibromyalgia 2014    Fibromyalgia     GERD (gastroesophageal reflux disease)     History of psychiatric hospitalization     Hyperlipidemia 1992    Hypertension     Kidney stone     Migraine headache     Osteoporosis     Psoriatic arthritis 1992    Right knee pain     post knee replacement surgery (possible rejectiion of metal)    RLS (restless legs syndrome)     Schizophrenia 1992    stable on meds    Urinary tract infection         Surgical History:  Past Surgical History:   Procedure Laterality Date     SECTION      cyst removed from right sinus  1982    HYSTERECTOMY      VAGINAL HYSTERECTOMY WITHOUT BSO - ENDOMETRIOSIS    JOINT REPLACEMENT Right     knee    KNEE SURGERY      Surgery on right knee  1982    tumor removed from back left side upper shoulder  2006        Social History:  Social History     Socioeconomic History    Marital status:      Spouse name: Not on file    Number of children: 1    Years of education: Not on file    Highest education level: Not on file   Social Needs    Financial resource strain: Not on file    Food insecurity - worry: Not on file    Food insecurity - inability: Not on file    Transportation needs - medical: Not on file    Transportation needs - non-medical: Not on file   Occupational History    Occupation: retired asst [employer] [address].     Employer: 2010   Tobacco Use    Smoking status: Former Smoker     Packs/day: 0.50     Years: 10.00     Pack years: 5.00     Types: Cigarettes     Last attempt to quit: 2012     Years since quittin.2     Smokeless tobacco: Former User   Substance and Sexual Activity    Alcohol use: No    Drug use: No    Sexual activity: No     Birth control/protection: Post-menopausal   Other Topics Concern    Patient feels they ought to cut down on drinking/drug use Not Asked    Patient annoyed by others criticizing their drinking/drug use Not Asked    Patient has felt bad or guilty about drinking/drug use Not Asked    Patient has had a drink/used drugs as an eye opener in the AM Not Asked    Are you pregnant or think you may be? Not Asked    Breast-feeding Not Asked   Social History Narrative    Exercise:  Childcare.        Ett:  3 days a week       Physical Exam:  Vitals:    18 1031   BP: 112/68   Pulse: 74   Weight: 44.9 kg (99 lb)   PainSc:   7   PainLoc: Back     General    Nursing note and vitals reviewed.  Constitutional: She is oriented to person, place, and time. She appears well-developed and well-nourished. No distress.   HENT:   Head: Normocephalic and atraumatic.   Nose: Nose normal.   Eyes: Conjunctivae and EOM are normal. Pupils are equal, round, and reactive to light. Right eye exhibits no discharge. Left eye exhibits no discharge. No scleral icterus.   Neck: No JVD present.   Cardiovascular: Intact distal pulses.    Pulmonary/Chest: Effort normal. No respiratory distress.   Abdominal: She exhibits no distension.   Neurological: She is alert and oriented to person, place, and time. Coordination normal.   Psychiatric: She has a normal mood and affect. Her behavior is normal. Judgment and thought content normal.     General Musculoskeletal Exam   Gait: antalgic         Right Hip Exam   Right hip exam is normal.     Tenderness   The patient tender to palpation of the SI joint.    Tests   Pain w/ forced internal rotation (HELIO): present  Back (L-Spine & T-Spine) / Neck (C-Spine) Exam     Tenderness Right paramedian tenderness of the Sacrum.         Imagin18 - X-Ray Lumbar Spine Ap And  Lateral   Narrative    EXAMINATION:  XR LUMBAR SPINE AP AND LATERAL    CLINICAL HISTORY:  Low back pain, >6wks conservative tx, persistent-progressive sx, surgical candidate;pain r low back;Dorsalgia, unspecified    TECHNIQUE:  AP, lateral and spot images were performed of the lumbar spine.    COMPARISON:  None    FINDINGS:  DJD and lumbar scoliosis.  The L1/L2 and the L2/L3 disc spaces are significantly narrowed.  No fracture, spondylolisthesis or bone destruction identified        Labs:  BMP  Lab Results   Component Value Date     (L) 08/04/2018    K 4.3 08/04/2018     08/04/2018    CO2 22 (L) 08/04/2018    BUN 11 08/04/2018    CREATININE 1.0 08/04/2018    CALCIUM 9.6 08/04/2018    ANIONGAP 9 08/04/2018    ESTGFRAFRICA >60.0 08/04/2018    EGFRNONAA 59.3 (A) 08/04/2018     Lab Results   Component Value Date    ALT 13 08/04/2018    AST 22 08/04/2018    GGT 91 (H) 07/28/2014    ALKPHOS 81 08/04/2018    BILITOT 0.2 08/04/2018       Assessment:  Problem List Items Addressed This Visit     Low back pain - Primary    Sacroiliac joint dysfunction          This is a 63-year-old female with chronic low back pain the past 4 months.  The pain is nonradiating and exacerbated by prolonged sitting, standing, rolling over in bed.  Her physical exam demonstrates a positive HELIO sign on the right.  The history and exam are consistent with right sacroiliac joint dysfunction.  The patient states pain limits her ability to accomplish some activities of daily living and I have recommended a right sacroiliac joint injection for diagnostic and therapeutic purposes.  This injection will also facilitate her participation in physical therapy which is scheduled to start this coming Friday, August 17th.    Treatment Plan:   PT/OT/HEP: Attend as scheduled.  The injection will help reduce pain so that she can fully participate in PT.  I also strongly encouraged establishing a HEP based on PT.  Procedures: RIGHT SI injection  x1  Medications: No changes recommended at this time.  Imaging: No additional imaging recommended at this time.  Labs: Reviewed.  Medications are appropriately dosed for current hepatorenal function.    Follow Up: RTC p injection    Michelle Pineda Jr, MD  Interventional Pain Medicine / Anesthesiology    Disclaimer: This note was partly generated using dictation software which may occasionally result in transcription errors.

## 2018-08-10 NOTE — H&P (VIEW-ONLY)
[facility] New Patient Evaluation    Referred by: Sadaf Vargas MD  Reason for referral:M54.9,G89.29 (ICD-10-CM) - Chronic right-sided back pain, unspecified back location  CC:   Chief Complaint   Patient presents with    Low-back Pain       Last 3 PDI Scores 8/13/2018   Pain Disability Index (PDI) 42       HPI: Bhavna Landry is a 63 y.o. female referred for right sided back pain to right upper buttocks.  She points to the middle of the right buttock 1-2 inches to the right of midline.    Location: back  Severity: Currently: 7/10   Typical Range: 7/10     Exacerbation: 7/10   Onset: 3-4 months  Quality: Burning and constant  Radiation: none  Axial/Extremity Percentage of Pain: 100%  Exacerbating Factors: prolonged sitting, standing, rolling over in bed  Mitigating Factors: aleve  Assoc: denies night fever/night sweats, urinary incontinence, bowel incontinence, significant weight loss, significant motor weakness and loss of sensations    Previous Therapies:  PT:  Scheduled to start this coming Friday 8/17/18  HEP:  tries  TENS: none  Injections: Low Back 10-15 yrs ago, with relief - unsure of which injection she had  Surgery:    Right TKA and revision due to nickel allergy  Medications:   - NSAIDS:   - MSK Relaxants:   - TCAs:   - SNRIs:   - Topicals:   - Anticonvulsants:  - Opioids:     Current Pain Medications:  1. Aleve     Full Medication List:    Current Outpatient Medications:     albuterol 90 mcg/actuation inhaler, Inhale 2 puffs into the lungs every 6 (six) hours as needed for Wheezing., Disp: 1 each, Rfl: 11    amLODIPine (NORVASC) 5 MG tablet, TK 1 T PO QD, Disp: , Rfl: 11    aspirin (ECOTRIN) 81 MG EC tablet, Take 81 mg by mouth once daily., Disp: , Rfl:     atorvastatin (LIPITOR) 40 MG tablet, Take 1 tablet (40 mg total) by mouth once daily., Disp: 30 tablet, Rfl: 11    butalbital-acetaminophen-caff -40 mg Cap, TK ONE C PO Q 8 H PRF HA, Disp: , Rfl: 3     butalbital-acetaminophen-caffeine -40 mg (FIORICET, ESGIC) -40 mg per tablet, , Disp: , Rfl: 1    doxycycline (MONODOX) 100 MG capsule, Take 1 capsule (100 mg total) by mouth 2 (two) times daily., Disp: 20 capsule, Rfl: 0    fluorouracil (EFUDEX) 5 % cream, LATOYA EXT AA HS FOR 2 WKS, Disp: , Rfl: 3    multivitamin capsule, Take 1 capsule by mouth once daily., Disp: , Rfl:     ropinirole (REQUIP) 3 MG tablet, Take 1 tablet 2 hours before bedtime, Disp: 30 tablet, Rfl: 11    sumatriptan (IMITREX) 50 MG tablet, TK 1 T PO ONCE PRF MIGRAINE HA, Disp: , Rfl: 5    thiothixene (NAVANE) 10 MG capsule, Take 1 capsule (10 mg total) by mouth 2 (two) times daily., Disp: 60 capsule, Rfl: 5    tiZANidine (ZANAFLEX) 4 MG tablet, Take 1 tablet (4 mg total) by mouth nightly as needed. qhs, Disp: 20 tablet, Rfl: 2    topiramate (TOPAMAX) 25 MG tablet, Take 75 mg by mouth 2 (two) times daily., Disp: , Rfl:     venlafaxine (EFFEXOR-XR) 150 MG Cp24, Take 1 capsule (150 mg total) by mouth once daily., Disp: 30 capsule, Rfl: 5    benzonatate (TESSALON PERLES) 100 MG capsule, Take 2 capsules (200 mg total) by mouth 3 (three) times daily as needed for Cough., Disp: 20 capsule, Rfl: 0    methylPREDNISolone (MEDROL DOSEPACK) 4 mg tablet, , Disp: , Rfl:     tiZANidine 4 mg Cap, TK ONE C PO QHS, Disp: , Rfl: 11     Review of Systems:  Review of Systems   Constitutional: Negative for chills and fever.   HENT: Negative for nosebleeds.    Eyes: Negative for pain.   Respiratory: Negative for hemoptysis.    Cardiovascular: Negative for chest pain.   Gastrointestinal: Negative for nausea and vomiting.   Genitourinary: Negative for dysuria.   Musculoskeletal: Positive for back pain and joint pain. Negative for falls.   Skin: Negative for rash.   Neurological: Negative for tingling and focal weakness.   Endo/Heme/Allergies: Does not bruise/bleed easily.   Psychiatric/Behavioral: Negative for depression. The patient is not  nervous/anxious.        Allergies:  Adhesive; Chloromycetin [chloramphenicol sod succinate]; Etanercept; and Nickel sutures [surgical stainless steel]     Medical History:  Past Medical History:   Diagnosis Date    Allergy     Amblyopia     Anemia     Arthritis 1992    Cataract     Depression     Dry eyes     Dry mouth     Fibromyalgia 2014    Fibromyalgia     GERD (gastroesophageal reflux disease)     History of psychiatric hospitalization     Hyperlipidemia 1992    Hypertension     Kidney stone     Migraine headache     Osteoporosis     Psoriatic arthritis 1992    Right knee pain     post knee replacement surgery (possible rejectiion of metal)    RLS (restless legs syndrome)     Schizophrenia 1992    stable on meds    Urinary tract infection         Surgical History:  Past Surgical History:   Procedure Laterality Date     SECTION      cyst removed from right sinus  1982    HYSTERECTOMY      VAGINAL HYSTERECTOMY WITHOUT BSO - ENDOMETRIOSIS    JOINT REPLACEMENT Right     knee    KNEE SURGERY      Surgery on right knee  1982    tumor removed from back left side upper shoulder  2006        Social History:  Social History     Socioeconomic History    Marital status:      Spouse name: Not on file    Number of children: 1    Years of education: Not on file    Highest education level: Not on file   Social Needs    Financial resource strain: Not on file    Food insecurity - worry: Not on file    Food insecurity - inability: Not on file    Transportation needs - medical: Not on file    Transportation needs - non-medical: Not on file   Occupational History    Occupation: retired asst [employer] [address]     Employer: 2010   Tobacco Use    Smoking status: Former Smoker     Packs/day: 0.50     Years: 10.00     Pack years: 5.00     Types: Cigarettes     Last attempt to quit: 2012     Years since quittin.2     Smokeless tobacco: Former User   Substance and Sexual Activity    Alcohol use: No    Drug use: No    Sexual activity: No     Birth control/protection: Post-menopausal   Other Topics Concern    Patient feels they ought to cut down on drinking/drug use Not Asked    Patient annoyed by others criticizing their drinking/drug use Not Asked    Patient has felt bad or guilty about drinking/drug use Not Asked    Patient has had a drink/used drugs as an eye opener in the AM Not Asked    Are you pregnant or think you may be? Not Asked    Breast-feeding Not Asked   Social History Narrative    Exercise:  Childcare.        Ett:  3 days a week       Physical Exam:  Vitals:    18 1031   BP: 112/68   Pulse: 74   Weight: 44.9 kg (99 lb)   PainSc:   7   PainLoc: Back     General    Nursing note and vitals reviewed.  Constitutional: She is oriented to person, place, and time. She appears well-developed and well-nourished. No distress.   HENT:   Head: Normocephalic and atraumatic.   Nose: Nose normal.   Eyes: Conjunctivae and EOM are normal. Pupils are equal, round, and reactive to light. Right eye exhibits no discharge. Left eye exhibits no discharge. No scleral icterus.   Neck: No JVD present.   Cardiovascular: Intact distal pulses.    Pulmonary/Chest: Effort normal. No respiratory distress.   Abdominal: She exhibits no distension.   Neurological: She is alert and oriented to person, place, and time. Coordination normal.   Psychiatric: She has a normal mood and affect. Her behavior is normal. Judgment and thought content normal.     General Musculoskeletal Exam   Gait: antalgic         Right Hip Exam   Right hip exam is normal.     Tenderness   The patient tender to palpation of the SI joint.    Tests   Pain w/ forced internal rotation (HELIO): present  Back (L-Spine & T-Spine) / Neck (C-Spine) Exam     Tenderness Right paramedian tenderness of the Sacrum.         Imagin18 - X-Ray Lumbar Spine Ap And  Lateral   Narrative    EXAMINATION:  XR LUMBAR SPINE AP AND LATERAL    CLINICAL HISTORY:  Low back pain, >6wks conservative tx, persistent-progressive sx, surgical candidate;pain r low back;Dorsalgia, unspecified    TECHNIQUE:  AP, lateral and spot images were performed of the lumbar spine.    COMPARISON:  None    FINDINGS:  DJD and lumbar scoliosis.  The L1/L2 and the L2/L3 disc spaces are significantly narrowed.  No fracture, spondylolisthesis or bone destruction identified        Labs:  BMP  Lab Results   Component Value Date     (L) 08/04/2018    K 4.3 08/04/2018     08/04/2018    CO2 22 (L) 08/04/2018    BUN 11 08/04/2018    CREATININE 1.0 08/04/2018    CALCIUM 9.6 08/04/2018    ANIONGAP 9 08/04/2018    ESTGFRAFRICA >60.0 08/04/2018    EGFRNONAA 59.3 (A) 08/04/2018     Lab Results   Component Value Date    ALT 13 08/04/2018    AST 22 08/04/2018    GGT 91 (H) 07/28/2014    ALKPHOS 81 08/04/2018    BILITOT 0.2 08/04/2018       Assessment:  Problem List Items Addressed This Visit     Low back pain - Primary    Sacroiliac joint dysfunction          This is a 63-year-old female with chronic low back pain the past 4 months.  The pain is nonradiating and exacerbated by prolonged sitting, standing, rolling over in bed.  Her physical exam demonstrates a positive HELIO sign on the right.  The history and exam are consistent with right sacroiliac joint dysfunction.  The patient states pain limits her ability to accomplish some activities of daily living and I have recommended a right sacroiliac joint injection for diagnostic and therapeutic purposes.  This injection will also facilitate her participation in physical therapy which is scheduled to start this coming Friday, August 17th.    Treatment Plan:   PT/OT/HEP: Attend as scheduled.  The injection will help reduce pain so that she can fully participate in PT.  I also strongly encouraged establishing a HEP based on PT.  Procedures: RIGHT SI injection  x1  Medications: No changes recommended at this time.  Imaging: No additional imaging recommended at this time.  Labs: Reviewed.  Medications are appropriately dosed for current hepatorenal function.    Follow Up: RTC p injection    Michelle Pineda Jr, MD  Interventional Pain Medicine / Anesthesiology    Disclaimer: This note was partly generated using dictation software which may occasionally result in transcription errors.

## 2018-08-13 ENCOUNTER — TELEPHONE (OUTPATIENT)
Dept: INTERNAL MEDICINE | Facility: CLINIC | Age: 63
End: 2018-08-13

## 2018-08-13 ENCOUNTER — OFFICE VISIT (OUTPATIENT)
Dept: PAIN MEDICINE | Facility: CLINIC | Age: 63
End: 2018-08-13
Payer: MEDICARE

## 2018-08-13 VITALS
HEART RATE: 74 BPM | SYSTOLIC BLOOD PRESSURE: 112 MMHG | BODY MASS INDEX: 18.11 KG/M2 | DIASTOLIC BLOOD PRESSURE: 68 MMHG | WEIGHT: 99 LBS

## 2018-08-13 DIAGNOSIS — G89.29 CHRONIC RIGHT-SIDED LOW BACK PAIN WITHOUT SCIATICA: Primary | ICD-10-CM

## 2018-08-13 DIAGNOSIS — M53.3 SACROILIAC JOINT DYSFUNCTION: ICD-10-CM

## 2018-08-13 DIAGNOSIS — M54.50 CHRONIC RIGHT-SIDED LOW BACK PAIN WITHOUT SCIATICA: Primary | ICD-10-CM

## 2018-08-13 PROCEDURE — 3008F BODY MASS INDEX DOCD: CPT | Mod: CPTII,S$GLB,, | Performed by: PAIN MEDICINE

## 2018-08-13 PROCEDURE — 99999 PR PBB SHADOW E&M-EST. PATIENT-LVL IV: CPT | Mod: PBBFAC,,, | Performed by: PAIN MEDICINE

## 2018-08-13 PROCEDURE — 99204 OFFICE O/P NEW MOD 45 MIN: CPT | Mod: S$GLB,,, | Performed by: PAIN MEDICINE

## 2018-08-13 PROCEDURE — 3074F SYST BP LT 130 MM HG: CPT | Mod: CPTII,S$GLB,, | Performed by: PAIN MEDICINE

## 2018-08-13 PROCEDURE — 3078F DIAST BP <80 MM HG: CPT | Mod: CPTII,S$GLB,, | Performed by: PAIN MEDICINE

## 2018-08-13 NOTE — TELEPHONE ENCOUNTER
----- Message from Adrianna Sethi sent at 8/13/2018  4:22 PM CDT -----  Contact: pt [phone]  Pt states that she was in the office last week and weighed 104 and today she weighs 99 pounds and would like to discuss this. Please advise.

## 2018-08-13 NOTE — LETTER
August 13, 2018      Sadaf Vargas MD  [address]  [place] [address]           [facility]  [address]  [facility] [address]  Phone: [phone]          Patient: Bhavna Landry   MR Number: [id number]   YOB: 1955   Date of Visit: 8/13/2018       Dear Dr. Sadaf Vargas:    Thank you for referring Bhavna Landry to me for evaluation. Attached you will find relevant portions of my assessment and plan of care.    If you have questions, please do not hesitate to call me. I look forward to following Bhavna Landry along with you.    Sincerely,    Michelle Pineda Jr., MD    Enclosure  CC:  No Recipients    If you would like to receive this communication electronically, please contact [email or web address] or [phone] to request more information on [employer] Link access.    For providers and/or their staff who would like to refer a patient to [facility], please contact us through our one-stop-shop provider referral line, [facility], at [phone].    If you feel you have received this communication in error or would no longer like to receive these types of communications, please e-mail [email or web address]

## 2018-08-14 ENCOUNTER — TELEPHONE (OUTPATIENT)
Dept: INTERNAL MEDICINE | Facility: CLINIC | Age: 63
End: 2018-08-14

## 2018-08-14 NOTE — TELEPHONE ENCOUNTER
----- Message from Angela Fallon sent at 8/14/2018  9:57 AM CDT -----  Contact: Bhavna Pierre [phone]  Patient is returning a phone call.  Who left a message for the patient: CELINE CIFUENTES  Does patient know what this is regarding:    Comments:

## 2018-08-15 ENCOUNTER — OFFICE VISIT (OUTPATIENT)
Dept: INTERNAL MEDICINE | Facility: CLINIC | Age: 63
End: 2018-08-15
Payer: MEDICARE

## 2018-08-15 VITALS
HEART RATE: 80 BPM | DIASTOLIC BLOOD PRESSURE: 70 MMHG | WEIGHT: 103.38 LBS | OXYGEN SATURATION: 99 % | BODY MASS INDEX: 19.02 KG/M2 | HEIGHT: 62 IN | SYSTOLIC BLOOD PRESSURE: 128 MMHG

## 2018-08-15 DIAGNOSIS — Z00.00 ANNUAL PHYSICAL EXAM: Primary | ICD-10-CM

## 2018-08-15 DIAGNOSIS — Z12.9 SCREENING FOR CANCER: ICD-10-CM

## 2018-08-15 DIAGNOSIS — E78.5 HYPERLIPIDEMIA, UNSPECIFIED HYPERLIPIDEMIA TYPE: ICD-10-CM

## 2018-08-15 DIAGNOSIS — N95.9 MENOPAUSAL DISORDER: ICD-10-CM

## 2018-08-15 DIAGNOSIS — M53.3 SACROILIAC DYSFUNCTION: ICD-10-CM

## 2018-08-15 DIAGNOSIS — I10 ESSENTIAL HYPERTENSION: ICD-10-CM
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
## 2018-08-15 PROCEDURE — 96372 THER/PROPH/DIAG INJ SC/IM: CPT | Mod: S$GLB,,, | Performed by: INTERNAL MEDICINE

## 2018-08-15 PROCEDURE — 99999 PR PBB SHADOW E&M-EST. PATIENT-LVL IV: CPT | Mod: PBBFAC,,, | Performed by: INTERNAL MEDICINE

## 2018-08-15 PROCEDURE — 3074F SYST BP LT 130 MM HG: CPT | Mod: CPTII,S$GLB,, | Performed by: INTERNAL MEDICINE

## 2018-08-15 PROCEDURE — 99214 OFFICE O/P EST MOD 30 MIN: CPT | Mod: 25,S$GLB,, | Performed by: INTERNAL MEDICINE

## 2018-08-15 PROCEDURE — 3078F DIAST BP <80 MM HG: CPT | Mod: CPTII,S$GLB,, | Performed by: INTERNAL MEDICINE

## 2018-08-15 RX ORDER — KETOROLAC TROMETHAMINE 30 MG/ML
30 INJECTION, SOLUTION INTRAMUSCULAR; INTRAVENOUS
Status: COMPLETED | OUTPATIENT
Start: 2018-08-15 | End: 2018-08-15

## 2018-08-15 RX ADMIN — KETOROLAC TROMETHAMINE 30 MG: 30 INJECTION, SOLUTION INTRAMUSCULAR; INTRAVENOUS at 04:08

## 2018-08-15 NOTE — PROGRESS NOTES
Subjective:       Patient ID: Bhavna Landry is a 63 y.o. female.    Chief Complaint: Annual Exam; Back Pain (low back); Neck Pain (right side); and Cough    HPI   Cough has improved , but not resolved.  Continues to use inhaler.  Not wheezing as much.    Migraine improved, but minor headache persists.     Pain management note reviewed.  She would like an injection of Toradol, which really helped her headache.    C/o lump in neck an dear is painful..    RLS controlled with requip.    No significant carotid artery dis by last u/s 2017.  Review of Systems   Constitutional: Negative for fever and unexpected weight change.   HENT: Negative for congestion and postnasal drip.    Respiratory: Positive for cough. Negative for chest tightness, shortness of breath and wheezing.    Cardiovascular: Negative for chest pain and leg swelling.   Gastrointestinal: Negative for abdominal pain, anal bleeding, constipation, diarrhea, nausea and vomiting.   Genitourinary: Negative for dysuria and urgency.   Musculoskeletal: Positive for back pain.   Skin: Negative for rash.   Neurological: Positive for headaches.   Psychiatric/Behavioral: Negative for dysphoric mood and sleep disturbance. The patient is not nervous/anxious.        Objective:      Physical Exam   Constitutional: She is oriented to person, place, and time. She appears well-developed and well-nourished. No distress.   HENT:   Head: Normocephalic and atraumatic.   Right Ear: External ear normal.   Left Ear: External ear normal.   Nose: Nose normal.   Mouth/Throat: Oropharynx is clear and moist.   Eyes: Conjunctivae and EOM are normal. Pupils are equal, round, and reactive to light. Right eye exhibits no discharge. Left eye exhibits no discharge. No scleral icterus.   Neck: Normal range of motion. Neck supple. No JVD present. No thyromegaly present.   Mild fullness R mid neck - but no discreet masses.    Cardiovascular: Normal rate, regular rhythm and normal heart sounds.  Exam reveals no gallop.   No murmur heard.  Pulmonary/Chest: Effort normal and breath sounds normal. No respiratory distress. She has no wheezes. She has no rales.   Bronchial cough     Abdominal: Soft. Bowel sounds are normal. She exhibits no distension and no mass. There is no tenderness. There is no rebound and no guarding.   Musculoskeletal: Normal range of motion. She exhibits no edema or tenderness.   Lymphadenopathy:     She has no cervical adenopathy.   Neurological: She is alert and oriented to person, place, and time. No cranial nerve deficit. Coordination normal.   Skin: Skin is warm and dry. No rash noted.   Psychiatric: She has a normal mood and affect. Her behavior is normal. Judgment and thought content normal.       Results for orders placed or performed in visit on 08/04/18   Comprehensive metabolic panel   Result Value Ref Range    Sodium 135 (L) 136 - 145 mmol/L    Potassium 4.3 3.5 - 5.1 mmol/L    Chloride 104 95 - 110 mmol/L    CO2 22 (L) 23 - 29 mmol/L    Glucose 87 70 - 110 mg/dL    BUN, Bld 11 8 - 23 mg/dL    Creatinine 1.0 0.5 - 1.4 mg/dL    Calcium 9.6 8.7 - 10.5 mg/dL    Total Protein 6.5 6.0 - 8.4 g/dL    Albumin 3.7 3.5 - 5.2 g/dL    Total Bilirubin 0.2 0.1 - 1.0 mg/dL    Alkaline Phosphatase 81 55 - 135 U/L    AST 22 10 - 40 U/L    ALT 13 10 - 44 U/L    Anion Gap 9 8 - 16 mmol/L    eGFR if African American >60.0 >60 mL/min/1.73 m^2    eGFR if non  59.3 (A) >60 mL/min/1.73 m^2   CBC auto differential   Result Value Ref Range    WBC 8.68 3.90 - 12.70 K/uL    RBC 5.02 4.00 - 5.40 M/uL    Hemoglobin 15.0 12.0 - 16.0 g/dL    Hematocrit 45.6 37.0 - 48.5 %    MCV 91 82 - 98 fL    MCH 29.9 27.0 - 31.0 pg    MCHC 32.9 32.0 - 36.0 g/dL    RDW 13.2 11.5 - 14.5 %    Platelets 360 (H) 150 - 350 K/uL    MPV 9.2 9.2 - 12.9 fL    Immature Granulocytes 0.2 0.0 - 0.5 %    Gran # (ANC) 4.2 1.8 - 7.7 K/uL    Immature Grans (Abs) 0.02 0.00 - 0.04 K/uL    Lymph # 3.4 1.0 - 4.8 K/uL    Mono #  0.6 0.3 - 1.0 K/uL    Eos # 0.4 0.0 - 0.5 K/uL    Baso # 0.07 0.00 - 0.20 K/uL    nRBC 0 0 /100 WBC    Gran% 48.3 38.0 - 73.0 %    Lymph% 39.4 18.0 - 48.0 %    Mono% 6.9 4.0 - 15.0 %    Eosinophil% 4.4 0.0 - 8.0 %    Basophil% 0.8 0.0 - 1.9 %    Differential Method Automated    Lipid panel   Result Value Ref Range    Cholesterol 182 120 - 199 mg/dL    Triglycerides 65 30 - 150 mg/dL    HDL 84 (H) 40 - 75 mg/dL    LDL Cholesterol 85.0 63.0 - 159.0 mg/dL    HDL/Chol Ratio 46.2 20.0 - 50.0 %    Total Cholesterol/HDL Ratio 2.2 2.0 - 5.0    Non-HDL Cholesterol 98 mg/dL   TSH   Result Value Ref Range    TSH 0.972 0.400 - 4.000 uIU/mL     Assessment:       1. Annual physical exam    2. Menopausal disorder    3. Hyperlipidemia, unspecified hyperlipidemia type    4. Essential hypertension    5. Screening for cancer        Plan:       Bhavna was seen today for annual exam, back pain, neck pain and cough.    Diagnoses and all orders for this visit:    Annual physical exam    Menopausal disorder  -     DXA Bone Density Spine And Hip; Future    Hyperlipidemia, unspecified hyperlipidemia type    Essential hypertension    Screening for cancer  -     CT Chest Lung Screening Low Dose; Future       Continue with albuterol.  Call if cough doesn't continue to improve.       Call if neck tenderness doesn't resolve.  Observe for adenopathy.      PRevnar now

## 2018-08-17 ENCOUNTER — CLINICAL SUPPORT (OUTPATIENT)
Dept: REHABILITATION | Facility: HOSPITAL | Age: 63
End: 2018-08-17
Payer: MEDICARE

## 2018-08-17 DIAGNOSIS — R53.1 DECREASED STRENGTH: ICD-10-CM

## 2018-08-17 DIAGNOSIS — M54.50 ACUTE RIGHT-SIDED LOW BACK PAIN WITHOUT SCIATICA: ICD-10-CM

## 2018-08-17 DIAGNOSIS — M53.86 DECREASED ROM OF LUMBAR SPINE: ICD-10-CM

## 2018-08-17 PROCEDURE — 97161 PT EVAL LOW COMPLEX 20 MIN: CPT | Mod: PN

## 2018-08-17 PROCEDURE — G8978 MOBILITY CURRENT STATUS: HCPCS | Mod: CK,PN

## 2018-08-17 PROCEDURE — G8979 MOBILITY GOAL STATUS: HCPCS | Mod: CJ,PN

## 2018-08-17 PROCEDURE — 97110 THERAPEUTIC EXERCISES: CPT | Mod: PN

## 2018-08-17 NOTE — PLAN OF CARE
TIME RECORD    Date: 8/17/2018    Start Time:  11:20  Stop Time:  12:00    PROCEDURES:    TIMED  Procedure Min.   TE 10                     UNTIMED  Procedure Min.   IE 30         Total Timed Minutes:  10  Total Timed Units:  1 TE  Total Untimed Units:  1 LCE  Charges Billed/# of units:  1 TE + 1 LCE    [facility]    PHYSICAL THERAPY EVALUATION  Onset Date: 4/2018  Medical Diagnosis:      M54.9 (ICD-10-CM) - Back pain, unspecified back location, unspecified back pain laterality, unspecified chronicity   Treatment Diagnosis:   1. Decreased ROM of lumbar spine     2. Decreased strength     3. Acute right-sided low back pain without sciatica       Physician: Sadaf Vargas MD  Past Medical History:   Diagnosis Date    Allergy     Amblyopia     Anemia     Arthritis 02/02/1992    Cataract     Depression     Dry eyes     Dry mouth     Fibromyalgia 4/17/2014    Fibromyalgia     GERD (gastroesophageal reflux disease)     History of psychiatric hospitalization     Hyperlipidemia 02/02/1992    Hypertension     Kidney stone     Migraine headache     Osteoporosis     Psoriatic arthritis 02/02/1992    Right knee pain     post knee replacement surgery (possible rejectiion of metal)    RLS (restless legs syndrome)     Schizophrenia 02/02/1992    stable on meds    Urinary tract infection      Precautions: R TKA and revision, psoriatic arthritis, osteoporosis, migraines, HTN, psych history, depression, fibromyalgia, GERD  Prior Therapy: yes for R knee  Medications: Bhavna Landry has a current medication list which includes the following prescription(s): albuterol, amlodipine, aspirin, atorvastatin, benzonatate, butalbital-acetaminophen-caff, butalbital-acetaminophen-caffeine -40 mg, doxycycline, fluorouracil, multivitamin, pneumoc 13-evert conj-dip cr(pf), ropinirole, sumatriptan, thiothixene, tizanidine, topiramate, and venlafaxine.  Nutrition:  Normal  History of Present Illness:  See subjective below  Prior Level of Function: Independent  Social History: retired [place] [employer] as   Place of Residence (Steps/Adaptations): 1-2 steps to enter [facility]  Functional Deficits Leading to Referral/Nature of Injury:  prolonged standing, STS especially in the morning (has to use night table to stand initially), rolling in bed, bending over, squatting, and at times sitting.  Patient Therapy Goals: No more pain    Subjective     Bhavna Landry states she started to have R sided and midline low back pain starting about 3-4 months ago that is more at the location of her R upper buttock. Pt does have a history of falling out the attic about 10 years ago creating a disc issue, but has recovered from that. Pain now is worse int he mornings, prolonged standing, STS especially in the morning (has to use night table to stand initially), rolling in bed, bending over, squatting, and at times sitting. Pain has been about the same since it started. Pt reports she used to walk on her treadmill for an hour 3x a week before her back pain started, but now only goes for 45 mins 2x a week dur to her back pain. Pt is L side sleeper in sidelying, pain in supine with legs straight that is relieved with hookyling position.  Pt also reports since changing to a firm mattress that has greatly helped with pain in and out of bed. Pt has 3 grandchildren that she takes care of at home.    Pain:  Location: back  and buttocks   Description: Aching  Activities Which Increase Pain: Standing, Bending, Walking, Morning and Getting out of bed/chair  Activities Which Decrease Pain: pain medication, lying down, rest and long sitting in bed  Pain Scale: 5/10 at best 6/10 now  8/10 at worst    Objective     Posture: R trunk lean in standing and sitting, R shoulder greatly lower than L, R rotation slightly  Spine: L upper thoracic curve, R mid-lower thoracic curve, and L lumbar curve with decreased lumbar lordosis  Palpation: +TTP at R  SI joint, L  Sensation: light touch intact  DTRs: 3+/5 S1 and L3  Range of Motion/Strength:  * = R low back pain with testing  AROM: LUMBAR   Flexion 100%*, pain w/ return to ext   Extension 25%*   Right side bending 75%   Left side bending 75%*   Right rotation 75%   Left rotation 75%     Hip  Right   Left  Pain/Dysfunction with Movement    AROM PROM MMT AROM PROM MMT    Flexion WFL WFL 4/5 WFL WFL 4+/5    Extension WFL WFL 4-/5* WFL WFL 3+/5*    Abduction WFL WFL 4-/5 WFL WFL 4/5    Adduction WFL WFL 4/5 WFL WFL 4/5    Internal rotation WFL WFL 4/5 WFL WFL 4/5    External rotation WFL WFL 4/5 WFL WFL 4/5       Knee  Right   Left  Pain/Dysfunction with Movement    AROM PROM MMT AROM PROM MMT    Flexion WFL WFL 4+/5* WFL WFL 4+/5    Extension WFL WFL 4+/5* WFL WFL 4+/5      Ankle  Right   Left  Pain/Dysfunction with Movement    AROM PROM MMT AROM PROM MMT    Plantarflexion WFL WFL 5/5* WFL WFL 4+/5    Dorsiflexion WFL WFL 5/5* WFL WFL 5/5    Inversion WFL WFL NT WFL WFL NT    Eversion WFL WFL NT WFL WFL NT      Flexibility: significant B quad /hip flexor tightness  Gait: Without AD  Analysis: R trunk lean, decreased trish.  Bed Mobility:Independent  Transfers: Independent  Special Tests:   Slump test = NT  Quadrant test = NT  SLR Test = negative on R, postive on L in descent of L LE  SL Bridge Test (glut med strength) = NT  Ely's test = positive B for quad tightness  Prone Instability Test = NT  Niko Test = NT   J-Sign= NT  HS Length 90-90 test = NT    SI Tests:  SI distraction test = NT  SI compression test (sidelying) = NT  Flick test = NT  Thigh Thrust Test = NT  Sacral Spring = NT    Repeated Measures:  Lumbar flexion = increased low back pain  Lumbar extension = increased low back pain    CMS Impairment/Limitation/Restriction for FOTO Lumbar Spine Survey    Therapist reviewed FOTO scores for Bhavna Landry on 8/17/2018.   FOTO documents entered into EPIC - see Media section.    Limitation Score:  50%  Category: Mobility    Current : CK = at least 40% but < 60% impaired, limited or restricted  Goal: CJ = at least 20% but < 40% impaired, limited or restricted       TREATMENT     Time In: 11:50  Time Out: 12:00    PT Evaluation Completed? Yes  Discussed Plan of Care with patient: Yes    Bhavna received 10 minutes of therapeutic exercise & instruction including:  DATE 8/17/2018   VISIT 1   POC 10/17/18    G CODE 1/10   FOTO 1/5   Cap Visit  Cap Total 113.20  113.20   MHP    MT    Stretches:    SKTC 1x30'' B   Piriformis stretch    Hip flexor stretch    Supine:    LTRs 10x   TAs Next    Brace knee fall outs    Brace marching    Bridges 10x   SL hip abd 10x B   Clams next   Thoracic rotations    Dead bug        Prone:    Glut sets    Butt winks    Prone contra UE/LE ext     Quadruped contra UE/LE ext    Cat-Camel    Isometric multifidi submax contraction        Standing:    Rows    Lat pull downs Next    Calf stretch on fitter    Leg Press Next    Table turns on SB    Lifts    Chops    Gait    CP    INITIALS SHOBHA     Written Home Exercises Provided: Yes  Bhavna demo good understanding of the education provided. Patient demo good return demo of skill of exercises.    See EMR in Patient Instructions for HEP given: Yes      Assessment       Initial Assessment (Pertinent finding, problem list and factors affecting outcome):   Pt is a 62 yo female dx with Back pain (Unspecified back location) presenting to PT at [facility]. Pt currently presents with R sided SI and R buttock pain, decreased lumbar ROM, decreased BLE and trunk/core strength, 2 curve scoliosis, poor posture, impaired balance and gait, and functional deficits with lifting activities, prolonged standing activities, and rolling in bed. Pt would benefit from skilled PT consisting of gait training, muscular skeletal stretching/strengthening, manual therapy, neuro muscular re-education, and modalities prn to address limitations and  increase functional mobility.      History  Co-morbidities and personal factors that may impact the plan of care Co-morbidities:   artritis, back pain, cancer, depression, headaches, HTN      Personal Factors:   no deficits     high   Examination  Body Structures and Functions, activity limitations and participation restrictions that may impact the plan of care Body Regions:   neck  back  lower extremities  trunk    Body Systems:    gross symmetry  ROM  strength  gross coordinated movement  balance  gait  transfers  transitions  motor control    Participation Restrictions:   Prolonged standing ADLs.    Activity limitations:   Learning and applying knowledge  no deficits    General Tasks and Commands  no deficits    Communication  no deficits    Mobility  lifting and carrying objects  walking  driving (bike, car, motorcycle)    Self care  no deficits    Domestic Life  shopping  cooking  doing house work (cleaning house, washing dishes, laundry)    Interactions/Relationships  no deficits    Life Areas  no deficits    Community and Social Life  no deficits         high   Clinical Presentation stable and uncomplicated low   Decision Making/ Complexity Score: low       Rehab Potiential: good  Spiritual/Cultural Needs: None  Barriers to Rehab: None  GOALS: Short Term Goals: 4 weeks  1. Pt will demo good TA muscle contraction for improved deep abdominal strength and lumbar stability.  3. Increase lumbar ROM to WNL without pain in order to improve functional mobility.    4. Pt will demo good sitting/standing posture and body mechanics for improved spine health and decreased risk of future injury.  5. Pt to tolerate HEP to improve ROM and independence with ADL's.    Long Term Goals: 8 weeks  1. Report decreased low back pain without radiculopathy to </= 2/10  to increase tolerance for ADLs  2. Increase strength to >/= 4/5 MMT grade for BLE to increase tolerance for ADL and work activities.  3. Pt to demonstrate negative SLR  and/or Slump Test in order to show improved core strength and decreased nerve/dural tension.  4. Patient's goal: No more pain  5. Pt will report at CJ level (20-40% impaired) on FOTO lumbar score for low back pain disability to demonstrate decrease in disability and improvement in back pain.      Plan     Certification Period: 8/17/18 to 10/17/18  Recommended Treatment Plan: 2 times per week for 8 weeks: Aquatic Therapy, Cervical/Lumbar Traction, Electrical Stimulation IFC/Russian, Gait Training, Manual Therapy, Moist Heat/ Ice, Neuromuscular Re-ed, Patient Education, Self Care, Therapeutic Activites and Therapeutic Exercise  Other Recommendations: modalities prn, ASTYM prn, kinesiotape prn, Functional Dry Needling prn       Cal Arredondo, PT  8/17/2018      I CERTIFY THE NEED FOR THESE SERVICES FURNISHED UNDER THIS PLAN OF TREATMENT AND WHILE UNDER MY CARE    Physician's comments: ________________________________________________________________________________________________________________________________________________      Physician's Name: ___________________________________

## 2018-08-21 ENCOUNTER — HOSPITAL ENCOUNTER (OUTPATIENT)
Dept: RADIOLOGY | Facility: HOSPITAL | Age: 63
Discharge: HOME | End: 2018-08-21
Attending: INTERNAL MEDICINE
Payer: MEDICARE

## 2018-08-21 ENCOUNTER — TELEPHONE (OUTPATIENT)
Dept: INTERNAL MEDICINE | Facility: CLINIC | Age: 63
End: 2018-08-21

## 2018-08-21 DIAGNOSIS — Z12.9 SCREENING FOR CANCER: ICD-10-CM

## 2018-08-21 DIAGNOSIS — N95.9 MENOPAUSAL DISORDER: ICD-10-CM

## 2018-08-21 PROCEDURE — 77080 DXA BONE DENSITY AXIAL: CPT | Mod: TC

## 2018-08-21 PROCEDURE — 77080 DXA BONE DENSITY AXIAL: CPT | Mod: 26,,, | Performed by: RADIOLOGY

## 2018-08-21 PROCEDURE — G0297 LDCT FOR LUNG CA SCREEN: HCPCS | Mod: TC

## 2018-08-21 NOTE — TELEPHONE ENCOUNTER
Ct of chest showed atherosclerosis of aorta, otherwise normal.  This is being treated with lipitor (Atorvastatin)

## 2018-08-21 NOTE — TELEPHONE ENCOUNTER
Called and advised pt of msg below and sched an apt for next week.     Pt would like to know results of CT chest lung.

## 2018-08-21 NOTE — TELEPHONE ENCOUNTER
----- Message from Sadaf Vargas MD sent at 8/21/2018 10:48 AM CDT -----  She has osteoporosis.  pls make appt at her convenience to discuss therapy

## 2018-08-21 NOTE — TELEPHONE ENCOUNTER
----- Message from eYnni Nagel sent at 8/21/2018 11:07 AM CDT -----  Type: Returning a call    Who left a message?  Hannah    When did the practice call?  today    Comments: Patient would like a call back today, please advise.    Thank you

## 2018-08-22 ENCOUNTER — TELEPHONE (OUTPATIENT)
Dept: PAIN MEDICINE | Facility: CLINIC | Age: 63
End: 2018-08-22

## 2018-08-22 ENCOUNTER — TELEPHONE (OUTPATIENT)
Dept: INTERNAL MEDICINE | Facility: CLINIC | Age: 63
End: 2018-08-22

## 2018-08-22 NOTE — TELEPHONE ENCOUNTER
----- Message from Maricel Muñoz sent at 8/22/2018  8:35 AM CDT -----  Contact: self / [phone]      ----- Message -----  From: Alexys Meza  Sent: 8/22/2018   8:26 AM  To: Alicia CASTANEDA Staff    Pt is calling to speak with 's nurse about her test results. Pt would like to know if she can get a call back as soon as possible. Please advise.          Thanks

## 2018-08-23 ENCOUNTER — TELEPHONE (OUTPATIENT)
Dept: INTERNAL MEDICINE | Facility: CLINIC | Age: 63
End: 2018-08-23

## 2018-08-23 ENCOUNTER — CLINICAL SUPPORT (OUTPATIENT)
Dept: REHABILITATION | Facility: HOSPITAL | Age: 63
End: 2018-08-23
Payer: MEDICARE

## 2018-08-23 DIAGNOSIS — R53.1 DECREASED STRENGTH: ICD-10-CM

## 2018-08-23 DIAGNOSIS — M53.86 DECREASED ROM OF LUMBAR SPINE: ICD-10-CM

## 2018-08-23 DIAGNOSIS — M54.50 ACUTE RIGHT-SIDED LOW BACK PAIN WITHOUT SCIATICA: ICD-10-CM

## 2018-08-23 PROCEDURE — 97140 MANUAL THERAPY 1/> REGIONS: CPT | Mod: PN

## 2018-08-23 PROCEDURE — 97110 THERAPEUTIC EXERCISES: CPT | Mod: PN

## 2018-08-23 NOTE — TELEPHONE ENCOUNTER
----- Message from Salena Lopes sent at 8/23/2018  1:00 PM CDT -----  Contact: Call her at [phone]  Patient want to know if  her CT Scan was to have been done with contrast or not?

## 2018-08-23 NOTE — PROGRESS NOTES
"DAILY TREATMENT NOTE    DATE: 8/23/2018    Start Time:  905  Stop Time:  955    PROCEDURES:    TIMED  Procedure Min.   Therex 35   Manual therapy 12           Total Timed Minutes:  47  Total Timed Units:  3  Total Untimed Units:  0  Charges Billed/# of units:  3  TEx2, MTx1      Progress/Current Status    Subjective:     Patient ID: Bhavna Landry is a 63 y.o. female.  Diagnosis:   1. Decreased ROM of lumbar spine     2. Decreased strength     3. Acute right-sided low back pain without sciatica       Pain: 7 /10  Patient reports complaint of R sided low back pain.    Objective:     Patient instructed in and performed all therex as per log with 1:1 by PTA x 35 minutes.  Manual therapy provided x 12 minutes in left sidelying including STM/MFR to R lumbosacral paraspinals down to piriformis, STM/MFR along sacral border.      DATE 8/23/18 8/17/2018   VISIT 2 1   POC 10/17/18      G CODE 2/10 1/10   FOTO 2/5 1/5   Cap Visit  Cap Total 88.10  201.30 113.20  113.20   MHP      MT      Stretches:      SKTC 3x30" B 1x30'' B   Piriformis stretch 30"x3 B     Hip flexor stretch MT     Supine:      LTRs 30x 10x   TAs 5"x10 Next    Brace knee fall outs      Brace marching Next     Bridges pain 10x   SL hip abd 2x10 B 10x B   Clams 2x10 B next   Thoracic rotations      Dead bug             Prone:      Glut sets Next     Butt winks      Prone contra UE/LE ext       Quadruped contra UE/LE ext      Cat-Camel      Isometric multifidi submax contraction      Sitting:     TAs 5"x10           Standing:      Rows RTC 2x10     Lat pull downs RTC 2x10 Next    Calf stretch on fitter      Leg Press Next Next    Table turns on SB      Lifts      Chops      Gait      CP      INITIALS DH 1/6 SHOBHA         Assessment:     Significant myofascial restrictions noted along R sacral border with manual therapy.  Able to complete all therex with reports of "Less pain now" after treatment.  Rates "4/10" after session.    Patient Education/Response: "     Patient educated to continue HEP.  Verbalized understanding.  Edu for postural awareness and TAs with daily activity, pillow positioning for sleeping and and instructed in bed mobility and supine <> sit with log roll technique, practiced same and demonstrated understanding.    Plans and Goals:     Continue PT per POC, progress as able.    GOALS: Short Term Goals: 4 weeks  1. Pt will demo good TA muscle contraction for improved deep abdominal strength and lumbar stability.  3. Increase lumbar ROM to WNL without pain in order to improve functional mobility.    4. Pt will demo good sitting/standing posture and body mechanics for improved spine health and decreased risk of future injury.  5. Pt to tolerate HEP to improve ROM and independence with ADL's.     Long Term Goals: 8 weeks  1. Report decreased low back pain without radiculopathy to </= 2/10  to increase tolerance for ADLs  2. Increase strength to >/= 4/5 MMT grade for BLE to increase tolerance for ADL and work activities.  3. Pt to demonstrate negative SLR and/or Slump Test in order to show improved core strength and decreased nerve/dural tension.  4. Patient's goal: No more pain  5. Pt will report at CJ level (20-40% impaired) on FOTO lumbar score for low back pain disability to demonstrate decrease in disability and improvement in back pain.

## 2018-08-23 NOTE — TELEPHONE ENCOUNTER
Attempted to contact Pt. To inform her that of your response. Phone call was sent to . Left  for Pt. To call practice back.    morgan

## 2018-08-24 ENCOUNTER — TELEPHONE (OUTPATIENT)
Dept: INTERNAL MEDICINE | Facility: CLINIC | Age: 63
End: 2018-08-24

## 2018-08-24 ENCOUNTER — CLINICAL SUPPORT (OUTPATIENT)
Dept: REHABILITATION | Facility: HOSPITAL | Age: 63
End: 2018-08-24
Payer: MEDICARE

## 2018-08-24 DIAGNOSIS — R53.1 DECREASED STRENGTH: ICD-10-CM

## 2018-08-24 DIAGNOSIS — M54.50 ACUTE RIGHT-SIDED LOW BACK PAIN WITHOUT SCIATICA: ICD-10-CM

## 2018-08-24 DIAGNOSIS — M53.86 DECREASED ROM OF LUMBAR SPINE: ICD-10-CM

## 2018-08-24 PROCEDURE — 97140 MANUAL THERAPY 1/> REGIONS: CPT | Mod: PN

## 2018-08-24 PROCEDURE — 97110 THERAPEUTIC EXERCISES: CPT | Mod: PN

## 2018-08-24 NOTE — TELEPHONE ENCOUNTER
Pt called and I advised of Dr. Vargas's message below. Pt stated she did not have the dye contrast but was charged for it. Pt states she will call imaging center and try to get her money back.

## 2018-08-24 NOTE — PROGRESS NOTES
"DAILY TREATMENT NOTE    DATE: 8/24/2018    Start Time:  1000  Stop Time:  1050    PROCEDURES:    TIMED  Procedure Min.   Therex  20   Manual therapy 12   Therex supervised 20            Total Timed Minutes:  32  Total Timed Units:  2  Total Untimed Units:  0  Charges Billed/# of units:  2 MTx1, TEx1      Progress/Current Status    Subjective:     Patient ID: Bhavna Landry is a 63 y.o. female.  Diagnosis:   1. Decreased ROM of lumbar spine     2. Decreased strength     3. Acute right-sided low back pain without sciatica       Pain: 7 /10  States she was feeling great after therapy - then she helped her grandson cut the grass, causing resumption of pain.    Objective:     Patient instructed in and performed all therex as per log with 1:1 by PTA x 20 minutes, additional 20 with supervision  Manual therapy provided x 12 minutes in left sidelying including STM/MFR to R lumbosacral paraspinals down to piriformis, STM/MFR along sacral border.      DATE 8/24/18 8/23/18 8/17/2018   VISIT 3 2 1   POC 10/17/18       G CODE 3/10 2/10 1/10   FOTO 3/5 2/5 1/5   Cap Visit  Cap Total   57.78  259.05 88.10  201.30 113.20  113.20   MHP       MT       Stretches:       SKTC 30"x3 B 3x30" B 1x30'' B   Piriformis stretch 30"x3 B 30"x3 B     Hip flexor stretch MT MT     Supine:       LTRs 30x 30x 10x   TAs 5" 2x10 5"x10 Next    Brace knee fall outs --      Brace marching 2x10 Next     Bridges NT pain 10x   SL hip abd 2x12 B 2x10 B 10x B   Clams 2x12 B 2x10 B next   Thoracic rotations       Dead bug               Prone:       Glut sets 5" 2x10 Next     Butt winks next      Prone contra UE/LE ext  --      Quadruped contra UE/LE ext --      Cat-Camel --      Isometric multifidi submax contraction --      Sitting:      TAs 5"x15 5"x10     March 2x10      Standing:       Rows RTC 2x12 RTC 2x10     Lat pull downs RTC 2x12 RTC 2x10 Next    Calf stretch on fitter       Leg Press  Next Next    Table turns on SB       Lifts       Chops       Gait  " "     CP       INITIALS  2/6  1/6 [facility]       Assessment:     Patient able to increase activity with added core stabilization with reports of "much better now" after treatment.  Not specific to scale.    Patient Education/Response:     Patient educated to continue HEP.  Verbalized understanding.      Plans and Goals:     Continue PT per POC, progress as able.    GOALS: Short Term Goals: 4 weeks  1. Pt will demo good TA muscle contraction for improved deep abdominal strength and lumbar stability.  3. Increase lumbar ROM to WNL without pain in order to improve functional mobility.    4. Pt will demo good sitting/standing posture and body mechanics for improved spine health and decreased risk of future injury.  5. Pt to tolerate HEP to improve ROM and independence with ADL's.     Long Term Goals: 8 weeks  1. Report decreased low back pain without radiculopathy to </= 2/10  to increase tolerance for ADLs  2. Increase strength to >/= 4/5 MMT grade for BLE to increase tolerance for ADL and work activities.  3. Pt to demonstrate negative SLR and/or Slump Test in order to show improved core strength and decreased nerve/dural tension.  4. Patient's goal: No more pain  5. Pt will report at CJ level (20-40% impaired) on FOTO lumbar score for low back pain disability to demonstrate decrease in disability and improvement in back pain.        "

## 2018-08-27 ENCOUNTER — TELEPHONE (OUTPATIENT)
Dept: DERMATOLOGY | Facility: CLINIC | Age: 63
End: 2018-08-27

## 2018-08-27 ENCOUNTER — TELEPHONE (OUTPATIENT)
Dept: INTERNAL MEDICINE | Facility: CLINIC | Age: 63
End: 2018-08-27

## 2018-08-27 NOTE — DISCHARGE INSTRUCTIONS
Home Care Instructions Pain Management:    1.  DIET:    You may resume your normal diet today.    2.  BATHING:    You may shower with luke warm water.    3.  DRESSING:    You may remove your bandage today.    4.  ACTIVITY LEVEL:      You may resume your normal activities 24 hours after your procedure.    5.  MEDICATIONS:    You may resume your normal medications today.    6.  SPECIAL INSTRUCTIONS:    No heat to the injection site for 24 hours including bath or shower, heating pad, moist heat or hot tubs.    Use an ice pack to the injection site for any pain or discomfort.  Apply ice packs for 20 minute intervals as needed.    If you have received any sedatives by mouth today, you can not drive for 12 hours.    If you have received sedation through an IV, you can not drive for 24 hours.    PLEASE CALL YOUR DOCTOR FOR THE FOLLOWIN.  Redness or swelling around the injection site.  2.  Fever of 101 degrees.  3.  Drainage (pus) from the injection site.  4.  For any continuous bleeding (some dried blood over the incision is normal.)    FOR EMERGENCIES:    If any unusual problems or difficulties occur during clinic hours, call [phone] or dial [phone].    Follow up with with your physician in 2-3 weeks.       Procedural Sedation  Procedural sedation is medicine to ease discomfort, pain, and anxiety during a procedure. The medicine is often given through an IV (intravenous) line in your arm or hand. In some cases, the medicine may be taken by mouth or inhaled. While you are under sedation, you will likely be awake. But you may not remember anything afterward.  Why procedural sedation is used  Sedation is used for many types of procedures. The goal is to reduce pain, anxiety, and stressful memories of a procedure. It can also help your healthcare provider treat you. For example, having a broken bone fixed may be easier if you feel relaxed.  Procedural sedation is used only for short, basic procedures. It is not used  for complex surgeries. Some procedures that use this type of sedation include:  · Dental surgery  · Breast biopsy, to take a sample of breast tissue  · Endoscopy, to look at gastrointestinal problems  · Bronchoscopy, to check for lung problems  · Bone or joint realignment, to fix a broken bone or dislocated joint  · Minor foot or skin surgery  · Electrical cardioversion, to restore a normal heart rhythm  · Lumbar puncture, to assess neurological disease  Risks of procedural sedation  Procedural sedation has some risks and possible side effects, such as:  · Headache  · Nausea and vomiting  · Unpleasant memory of the procedure  · Lowered rate of breathing  · Changes in heart rate and blood pressure (rare)  · Inhalation of stomach contents into your lungs (rare)  Side effects will likely go away shortly after the procedure. Your healthcare team will watch your heart rate and breathing during your sedation. This is to help prevent problems.  Your own risks may vary based on your age and your overall health. They also depend on the type of sedation you are given. Talk with your healthcare provider about the risks that apply most to you.  Getting ready for procedural sedation  Talk with your healthcare provider about how to get ready for your procedure. Tell him or her about all the medicines you take. This includes over-the-counter medicines such as ibuprofen. It also includes vitamins, herbs, and other supplements. You may need to stop taking some medicines before the procedure, such as blood thinners and aspirin. If you smoke, you should stop to lessen the chance of a lung issue. Talk with your healthcare provider if you need help to stop smoking.  Tell your healthcare provider if you:  · Have had any problems in the past with sedation or anesthesia  · Have had any recent changes in your health, such as an infection or fever  · Are pregnant or think you may be pregnant  Also be sure to:  · Ask a family member or friend  to take you home after the procedure. You cant drive on the day you receive sedation.  · Not eat or drink after midnight the night before your procedure, if advised.  · Not plan on making any important decisions, such as financial or legal, for the day after you receive the sedation.  · Follow all other instructions from your healthcare provider.  During your procedural sedation  You may have your procedure in a hospital or a medical clinic. Sedation is done by a trained healthcare provider. In general, you can expect the following:  · You will be given medicine through an IV line in your arm or hand. Or you may receive a shot. The medicine may also be given by mouth. Or you may inhale it through a mask.  · If you receive medicine through an IV, you may feel the effects very quickly. You will start to feel relaxed and drowsy.  · During the procedure, your heart rate, breathing, and blood pressure will be closely watched. Your breathing and blood pressure may decrease a little. But you will likely not need help with your breathing. You may receive a little extra oxygen through a mask or through some soft plastic prongs underneath your nose.  · You will probably be awake the entire time. If you do fall asleep, you should be easy to wake up, if needed. You should feel little or no pain.  · When your procedure is over, the sedative medicine will be stopped.  After your procedural sedation  You will begin to feel more awake and aware. But you will likely be drowsy for a while afterward. You will be closely watched as you become more alert. You may have a faint memory of the procedure. Or you may not remember it at all.  You should be able to return home within an hour or two after your procedure. Plan to have someone stay with you for a few hours. Side effects such as headache and nausea may go away quickly. Tell your healthcare provider if they continue.  Dont drive or make any important decisions for at least 24  hours. Be sure to follow all after-care instructions.     When to call your healthcare provider  Have someone call your healthcare provider right away if you have any of these:  · Drowsiness that gets worse  · Weakness or dizziness that gets worse  · Repeated vomiting  · You cant be awakened  · Severe or ongoing pain from the procedure, not relieved by the pain medicine   Date Last Reviewed: 2/1/2017  © 2159-5713 [employer]. [address] [place] [address], [place] [address]. All rights reserved. This information is not intended as a substitute for professional medical care. Always follow your healthcare professional's instructions.

## 2018-08-27 NOTE — TELEPHONE ENCOUNTER
----- Message from Maricel Muñoz sent at 8/27/2018  8:49 AM CDT -----  Contact: Bhavna Pierre [phone]      ----- Message -----  From: Angela Fallon  Sent: 8/27/2018   8:19 AM  To: Alicia CASTANEDA Staff    Bhavna would like a call back . Bhavna states she does not want to leave a message she just have a few questions to ask the nurse.

## 2018-08-27 NOTE — TELEPHONE ENCOUNTER
----- Message from Josefina Vega MD sent at 8/27/2018  4:43 PM CDT -----  Contact: pty at [phone]  She can use over the counter hydrocortisone, it may take many weeks for the redness to resolve.   ----- Message -----  From: Loretta Irby MA  Sent: 8/27/2018   4:35 PM  To: MD Dr. Gary Carvajal Mrs. Pierre stated the tx cryo still have her face pink  ----- Message -----  From: Delaney Patricia  Sent: 8/27/2018   4:21 PM  To: Gary DE LUNA [facility] pt-pt is calling in ref to an area on her face that was frozen.  Using an ointment on it.  Area is still pink.  Please call and advise

## 2018-08-27 NOTE — TELEPHONE ENCOUNTER
----- Message from Delaney Gomez sent at 8/27/2018  4:53 PM CDT -----  Contact: pt at [phone]  [facility] pt-Please try again.  Missed a call a few minutes

## 2018-08-29 ENCOUNTER — LAB VISIT (OUTPATIENT)
Dept: LAB | Facility: HOSPITAL | Age: 63
End: 2018-08-29
Attending: INTERNAL MEDICINE
Payer: MEDICARE

## 2018-08-29 ENCOUNTER — OFFICE VISIT (OUTPATIENT)
Dept: INTERNAL MEDICINE | Facility: CLINIC | Age: 63
End: 2018-08-29
Payer: MEDICARE

## 2018-08-29 VITALS
BODY MASS INDEX: 19.21 KG/M2 | HEIGHT: 62 IN | SYSTOLIC BLOOD PRESSURE: 136 MMHG | OXYGEN SATURATION: 98 % | DIASTOLIC BLOOD PRESSURE: 78 MMHG | HEART RATE: 65 BPM | WEIGHT: 104.38 LBS

## 2018-08-29 DIAGNOSIS — M81.0 OSTEOPOROSIS, POST-MENOPAUSAL: Primary | ICD-10-CM

## 2018-08-29 DIAGNOSIS — M81.0 OSTEOPOROSIS, POST-MENOPAUSAL: ICD-10-CM

## 2018-08-29 LAB
25(OH)D3+25(OH)D2 SERPL-MCNC: 52 NG/ML
PTH-INTACT SERPL-MCNC: 21 PG/ML

## 2018-08-29 PROCEDURE — 83970 ASSAY OF PARATHORMONE: CPT

## 2018-08-29 PROCEDURE — 99999 PR PBB SHADOW E&M-EST. PATIENT-LVL III: CPT | Mod: PBBFAC,,, | Performed by: INTERNAL MEDICINE

## 2018-08-29 PROCEDURE — 82306 VITAMIN D 25 HYDROXY: CPT

## 2018-08-29 PROCEDURE — 3075F SYST BP GE 130 - 139MM HG: CPT | Mod: CPTII,S$GLB,, | Performed by: INTERNAL MEDICINE

## 2018-08-29 PROCEDURE — 99213 OFFICE O/P EST LOW 20 MIN: CPT | Mod: S$GLB,,, | Performed by: INTERNAL MEDICINE

## 2018-08-29 PROCEDURE — 36415 COLL VENOUS BLD VENIPUNCTURE: CPT

## 2018-08-29 PROCEDURE — 3078F DIAST BP <80 MM HG: CPT | Mod: CPTII,S$GLB,, | Performed by: INTERNAL MEDICINE

## 2018-08-29 PROCEDURE — 3008F BODY MASS INDEX DOCD: CPT | Mod: CPTII,S$GLB,, | Performed by: INTERNAL MEDICINE

## 2018-08-29 RX ORDER — ALENDRONATE SODIUM 70 MG/1
70 TABLET ORAL
Qty: 4 TABLET | Refills: 11 | Status: SHIPPED | OUTPATIENT
Start: 2018-08-29 | End: 2018-12-28

## 2018-08-29 NOTE — PROGRESS NOTES
Subjective:       Patient ID: Bhavna Landry is a 63 y.o. female.    Chief Complaint: Follow-up    HPI   Here to discuss osteoporosis.  No dysphagia, dental issues.  No h/o fractures.    Progression of osteopenia from 2014.    She has h/o hypercalcemia and high normal PTH in past.  No h/o kidney stones.  Review of Systems   Constitutional: Negative for fever and unexpected weight change.   HENT: Negative for congestion and postnasal drip.    Eyes: Negative for pain, discharge and visual disturbance.   Respiratory: Negative for cough, chest tightness, shortness of breath and wheezing.    Cardiovascular: Negative for chest pain and leg swelling.   Gastrointestinal: Negative for abdominal pain, constipation, diarrhea and nausea.   Genitourinary: Negative for difficulty urinating, dysuria and hematuria.   Skin: Negative for rash.   Neurological: Negative for headaches.   Psychiatric/Behavioral: Negative for dysphoric mood and sleep disturbance. The patient is not nervous/anxious.        Objective:      Physical Exam   Constitutional: She appears well-developed and well-nourished. No distress.   Psychiatric: She has a normal mood and affect. Her behavior is normal.       Assessment:       1. Osteoporosis, post-menopausal        Plan:       Bhavna was seen today for follow-up.    Diagnoses and all orders for this visit:    Osteoporosis, post-menopausal  -     Vitamin D; Future  -     PTH, intact; Future    Other orders  -     alendronate (FOSAMAX) 70 MG tablet; Take 1 tablet (70 mg total) by mouth every 7 days.

## 2018-08-29 NOTE — PLAN OF CARE
Left message with pt regarding arrival time 1230p. Advised npo for 8 hours prior to procedure and hold asa 81 mg and dm medication. Advised must have a ride to and from procedure. Advised contact phone number [phone] for questions or concerns.

## 2018-08-30 ENCOUNTER — HOSPITAL ENCOUNTER (OUTPATIENT)
Facility: HOSPITAL | Age: 63
Discharge: HOME | End: 2018-08-30
Attending: PAIN MEDICINE | Admitting: PAIN MEDICINE
Payer: MEDICARE

## 2018-08-30 ENCOUNTER — TELEPHONE (OUTPATIENT)
Dept: INTERNAL MEDICINE | Facility: CLINIC | Age: 63
End: 2018-08-30

## 2018-08-30 VITALS
OXYGEN SATURATION: 99 % | BODY MASS INDEX: 19.14 KG/M2 | HEART RATE: 64 BPM | WEIGHT: 104 LBS | HEIGHT: 62 IN | TEMPERATURE: 98 F | SYSTOLIC BLOOD PRESSURE: 168 MMHG | RESPIRATION RATE: 17 BRPM | DIASTOLIC BLOOD PRESSURE: 77 MMHG

## 2018-08-30 DIAGNOSIS — M53.3 SACROILIAC JOINT DYSFUNCTION: Primary | ICD-10-CM

## 2018-08-30 DIAGNOSIS — G89.29 CHRONIC PAIN: ICD-10-CM

## 2018-08-30 PROCEDURE — 25500020 PHARM REV CODE 255: Performed by: PAIN MEDICINE

## 2018-08-30 PROCEDURE — 27096 INJECT SACROILIAC JOINT: CPT | Mod: RT,,, | Performed by: PAIN MEDICINE

## 2018-08-30 PROCEDURE — 63600175 PHARM REV CODE 636 W HCPCS: Performed by: PAIN MEDICINE

## 2018-08-30 PROCEDURE — 25000003 PHARM REV CODE 250: Performed by: PAIN MEDICINE

## 2018-08-30 PROCEDURE — 27096 INJECT SACROILIAC JOINT: CPT | Performed by: PAIN MEDICINE

## 2018-08-30 RX ORDER — LIDOCAINE HYDROCHLORIDE 10 MG/ML
INJECTION, SOLUTION EPIDURAL; INFILTRATION; INTRACAUDAL; PERINEURAL
Status: DISCONTINUED | OUTPATIENT
Start: 2018-08-30 | End: 2018-08-30 | Stop reason: HOSPADM

## 2018-08-30 RX ORDER — ALPRAZOLAM 0.5 MG/1
0.5 TABLET, ORALLY DISINTEGRATING ORAL ONCE AS NEEDED
Status: COMPLETED | OUTPATIENT
Start: 2018-08-30 | End: 2018-08-30

## 2018-08-30 RX ORDER — TRIAMCINOLONE ACETONIDE 40 MG/ML
INJECTION, SUSPENSION INTRA-ARTICULAR; INTRAMUSCULAR
Status: DISCONTINUED | OUTPATIENT
Start: 2018-08-30 | End: 2018-08-30 | Stop reason: HOSPADM

## 2018-08-30 RX ORDER — BUPIVACAINE HYDROCHLORIDE 2.5 MG/ML
INJECTION, SOLUTION EPIDURAL; INFILTRATION; INTRACAUDAL
Status: DISCONTINUED | OUTPATIENT
Start: 2018-08-30 | End: 2018-08-30 | Stop reason: HOSPADM

## 2018-08-30 RX ADMIN — ALPRAZOLAM 0.5 MG: 0.5 TABLET, ORALLY DISINTEGRATING ORAL at 12:08

## 2018-08-30 NOTE — PLAN OF CARE
Problem: Patient Care Overview  Goal: Plan of Care Review  Outcome: Outcome(s) achieved Date Met: 08/30/18  Procedure complete, tolerated well. Discharge instructions given and explained to patient. Patient verbalized understanding. Will follow up in pain clinic as scheduled. Discharged home with daughter() via private vehicle. Safety maintained.

## 2018-08-30 NOTE — DISCHARGE SUMMARY
[facility]  Discharge Note  Short Stay     Admit Date: 8/30/2018    Discharge Date: 8/30/2018     Attending Physician: Michelle Pineda Jr, MD    Diagnoses:  Active Hospital Problems    Diagnosis  POA    *Chronic pain [G89.29]  Yes      Resolved Hospital Problems   No resolved problems to display.     Discharged Condition: Good     Hospital Course: Patient was admitted for an outpatient interventional pain management procedure and tolerated the procedure well with no complications.     Final Diagnoses: Same as principal problem.     Disposition: Home or Self Care     Follow up/Patient Instructions:    Follow-up Information     Michelle Pineda Jr, MD. Go in 1 month.    Specialty:  Pain Medicine  Why:  Post-procedural Follow Up As Scheduled, Call to make an appointment if you do not have one  Contact information:  [address]  [address]  [place] [id number] [phone]                   Reconciled Medications:     Medication List      CONTINUE taking these medications    albuterol 90 mcg/actuation inhaler  Inhale 2 puffs into the lungs every 6 (six) hours as needed for Wheezing.     alendronate 70 MG tablet  Commonly known as:  FOSAMAX  Take 1 tablet (70 mg total) by mouth every 7 days.     amLODIPine 5 MG tablet  Commonly known as:  NORVASC  TK 1 T PO QD     aspirin 81 MG EC tablet  Commonly known as:  ECOTRIN  Take 81 mg by mouth once daily.     atorvastatin 40 MG tablet  Commonly known as:  LIPITOR  Take 1 tablet (40 mg total) by mouth once daily.     * butalbital-acetaminophen-caffeine -40 mg -40 mg per tablet  Commonly known as:  FIORICET, ESGIC     * butalbital-acetaminophen-caff -40 mg Cap  TK ONE C PO Q 8 H PRF HA     multivitamin capsule  Take 1 capsule by mouth once daily.     rOPINIRole 3 MG tablet  Commonly known as:  REQUIP  Take 1 tablet 2 hours before bedtime     sumatriptan 50 MG tablet  Commonly known as:  IMITREX  TK 1 T PO ONCE PRF MIGRAINE HA     thiothixene  10 MG capsule  Commonly known as:  NAVANE  Take 1 capsule (10 mg total) by mouth 2 (two) times daily.     tiZANidine 4 MG tablet  Commonly known as:  ZANAFLEX  Take 1 tablet (4 mg total) by mouth nightly as needed. qhs     topiramate 25 MG tablet  Commonly known as:  TOPAMAX  Take 75 mg by mouth 2 (two) times daily.     venlafaxine 150 MG Cp24  Commonly known as:  EFFEXOR-XR  Take 1 capsule (150 mg total) by mouth once daily.         * This list has 2 medication(s) that are the same as other medications prescribed for you. Read the directions carefully, and ask your doctor or other care provider to review them with you.               Discharge Procedure Orders (must include Diet, Follow-up, Activity)   Call MD for:  temperature >100.4     Call MD for:  severe uncontrolled pain     Call MD for:  redness, tenderness, or signs of infection (pain, swelling, redness, odor or green/yellow discharge around incision site)     Call MD for:  difficulty breathing or increased cough     Call MD for:  severe persistent headache     Call MD for:  worsening rash     Remove dressing in 24 hours       Michelle Pineda Jr, MD  Interventional Pain Medicine / Anesthesiology

## 2018-08-30 NOTE — OP NOTE
Procedure Note    Pre-operative Diagnosis: Chronic pain  Post-operative Diagnosis: Chronic pain  Procedure: (1) RIGHT Sacroiliac Joint Injection and (2) Intraoperative fluoroscopy  Procedure Date: 08/30/2018      Anesthesia: Local    Indications: (1) To alleviate pain and suffering, (2) reduce functional impairment, and for (3) diagnostic purposes.    The patients history and physical exam were reviewed. The risks (local discomfort, infection, headache, temporary or permanent weakness and/or numbness of one or both legs, temporary or permanent paraplegia, heart attack and stroke), benefits and alternatives to the procedure were discussed, and all questions were answered to the patients satisfaction. The patient agreed to proceed, and written, informed consent was verified.    Procedure in Detail: Patient was brought back to procedure room and placed in a prone position and head resting comfortably in head ring. Prior to the initiation of the procedure, the patient's identity, the site, and the nature of the procedure were verified.     The skin was prepped with chloroprep and sterile drapes were applied. The Right SI joint was identified by fluoroscopy in an oblique plane. Skin and subcutaneous tissues overyling the target area was anesthetized with 1-2 mL of Lidocaine 1%.  A 22g 3 1/2 inch spinal needle was advanced under intermittent fluoroscopy until joint space was entered. There was no paresthesia with needle placement. Aspiration was negative for blood. Omnipaque 0.5 mL was injected confirming appropriate position and spread into the joint without intravascualr uptake. Next, 2 mL containing 40 mg kenalog and 1 mL of 0.25% Marcaine  was injected without difficulty or resistance.  The spinal needle was removed with lidocaine flush and bandaged placed over site of needle insertion.      EBL: nil    Disposition: The patient tolerated the procedure well, and there were no apparent complications. Vital signs  remained stable throughout the procedure. The patient was taken to the recovery area where written discharge instructions for the procedure were given.     Follow-up: RTC as scheduled      Michelle Pineda Jr, MD  Interventional Pain Medicine / Anesthesiology

## 2018-08-30 NOTE — TELEPHONE ENCOUNTER
----- Message from Monica Irby RN sent at 8/30/2018 10:56 AM CDT -----      ----- Message -----  From: Sadaf Vargas MD  Sent: 8/30/2018  10:39 AM  To: Nurse Alicia ayala call - pth and vit d normal.    So, start fosamax (alendronate), as we discussed yesterday

## 2018-08-31 NOTE — TELEPHONE ENCOUNTER
----- Message from Etienne Sood sent at 8/31/2018  9:44 AM CDT -----  Contact: Patient [phone]  Patient is returning a phone call.  Who left a message for the patient: Gilda MCGREGOR   Does patient know what this is regarding:  Previous message  Comments:     Please call an advise  Thank you

## 2018-08-31 NOTE — TELEPHONE ENCOUNTER
----- Message from Catracho Hatfield sent at 8/31/2018  8:40 AM CDT -----  Contact: Self [phone]  Patient is returning a phone call.  Who left a message for the patient: Marcela  Does patient know what this is regarding: No   Comments:

## 2018-09-05 ENCOUNTER — TELEPHONE (OUTPATIENT)
Dept: INTERNAL MEDICINE | Facility: CLINIC | Age: 63
End: 2018-09-05

## 2018-09-05 ENCOUNTER — TELEPHONE (OUTPATIENT)
Dept: PSYCHIATRY | Facility: CLINIC | Age: 63
End: 2018-09-05

## 2018-09-05 ENCOUNTER — CLINICAL SUPPORT (OUTPATIENT)
Dept: REHABILITATION | Facility: HOSPITAL | Age: 63
End: 2018-09-05
Payer: MEDICARE

## 2018-09-05 ENCOUNTER — DOCUMENTATION ONLY (OUTPATIENT)
Dept: REHABILITATION | Facility: HOSPITAL | Age: 63
End: 2018-09-05

## 2018-09-05 DIAGNOSIS — M54.50 ACUTE RIGHT-SIDED LOW BACK PAIN WITHOUT SCIATICA: ICD-10-CM

## 2018-09-05 DIAGNOSIS — R53.1 DECREASED STRENGTH: ICD-10-CM

## 2018-09-05 DIAGNOSIS — G21.19 DRUG-INDUCED PARKINSONISM: Primary | ICD-10-CM

## 2018-09-05 DIAGNOSIS — M53.86 DECREASED ROM OF LUMBAR SPINE: ICD-10-CM

## 2018-09-05 PROCEDURE — 97140 MANUAL THERAPY 1/> REGIONS: CPT | Mod: PN

## 2018-09-05 PROCEDURE — 97110 THERAPEUTIC EXERCISES: CPT | Mod: PN

## 2018-09-05 NOTE — TELEPHONE ENCOUNTER
Pt states her back is hurting very little. the cramping alternates legs, She says its worse at night. she has tried aleve but did not get any reliefe

## 2018-09-05 NOTE — PROGRESS NOTES
Face to Face PTA Conference performed with  Estela Luis PTA, Mary Harmon PTA, Vern Uribe PTA Rusty Haley PTA regarding patient's current status, overall progress, and plan of care    Cal Arredondo, PT    Face to face meeting completed with Cal Tabares PT regarding current status and progress of   Bhavna H LaNasa .  Vern Uribe PTA     Face to face meeting completed with Cal Arredondo PT regarding current status and progress of   Bhavna H LaNasa .  Estela Luis PTA     Face to face meeting completed with Cal Arredondo PT regarding current status and progress of   Bhavna H LaNasa .  Rusty Haley PTA     Face to face meeting completed with Cal Arredondo PT regarding current status and progress of   Bhavna H LaNasa .  Mary Harmon PTA

## 2018-09-05 NOTE — TELEPHONE ENCOUNTER
pls call - continue to take alendronate.  She already has rx for Tizanidine - has she tried that for leg cramps?  If not, try it.

## 2018-09-05 NOTE — TELEPHONE ENCOUNTER
----- Message from Etienne Sood sent at 9/5/2018  1:52 PM CDT -----  Contact: Patient [phone]  Patient stating  Rx alendronate (FOSAMAX) 70 MG tablet started taking on Friday and now is experiencing leg cramps, not sure if Rx has given patient the cramps, requesting something to get rid of cramps sent to pharmacy below please. Leg cramps happened today during Physical therapy four times wanted to inform.    Pharmacy: [facility] [address] [place] [address] [address] [address] [place] [address] AT [facility] [address]    Please call an advise  Thank you

## 2018-09-05 NOTE — PROGRESS NOTES
"  Physical Therapy Daily Treatment Note     Name: Bhavna Landry  Clinic Number: [id number]    Therapy Diagnosis:   Encounter Diagnoses   Name Primary?    Decreased ROM of lumbar spine     Decreased strength     Acute right-sided low back pain without sciatica      Physician: Sadaf Vargas MD    Visit Date: 9/5/2018  Physician Orders: PT Eval and Treat  Medical Diagnosis: Back pain  Evaluation Date: 8/17/18  Authorization Period Expiration: 10/17/2018  Plan of Care Certification Period: 8/17/18 to 10/17/18  Visit #/Visits authorized: 4/ 12     Time In: ***  Time Out: ***  Total Billable Time: *** minutes    Precautions: {IP WOUND PRECAUTIONS OHS:53118}    Subjective     Pt reports: ***.  She {Actions; was/was not:35981} compliant with home exercise program.  Response to previous treatment: ***  Functional change: ***    Pain: {0-10:69494::"0"}/10  Location: {RIGHT/LEFT/BILATERAL:80240} {LOCATION ON BODY:72533}     Objective     Jessica received therapeutic exercises to develop {AMB PT PROGRESS OBJECTIVE:16467} for *** minutes including:  ***    DATE 9/5/18 8/24/18 8/23/18 8/17/2018   VISIT 4 3 2 1   POC 10/17/18        G CODE 4/10 3/10 2/10 1/10   FOTO NEXT 3/5 2/5 1/5   Cap Visit  Cap Total    57.78  259.05 88.10  201.30 113.20  113.20   MHP        MT        Stretches:        SKTC  30"x3 B 3x30" B 1x30'' B   Piriformis stretch  30"x3 B 30"x3 B     Hip flexor stretch  MT MT     Supine:        LTRs  30x 30x 10x   TAs  5" 2x10 5"x10 Next    Brace knee fall outs  --      Brace marching  2x10 Next     Bridges  NT pain 10x   SL hip abd  2x12 B 2x10 B 10x B   Clams  2x12 B 2x10 B next   Thoracic rotations        Dead bug                 Prone:        Glut sets  5" 2x10 Next     Butt winks  next      Prone contra UE/LE ext   --      Quadruped contra UE/LE ext  --      Cat-Camel  --      Isometric multifidi submax contraction  --      Sitting:       TAs  5"x15 5"x10     March  2x10      Standing:        Rows  RTC 2x12 RTC " "2x10     Lat pull downs  RTC 2x12 RTC 2x10 Next    Calf stretch on fitter        Leg Press   Next Next    Table turns on SB        Lifts        Chops        Gait        CP        INITIALS  DH 2/6 DH 1/6 SHOBHA       Jessica received the following manual therapy techniques: {AMB PT PROGRESS MANUAL THERAPY:23465} were applied to the: *** for *** minutes, including:  ***    Jessica participated in neuromuscular re-education activities to improve: {AMB PT PROGRESS NEURO RE-ED:39400} for *** minutes. The following activities were included:  ***    Jessica participated in dynamic functional therapeutic activities to improve functional performance for ***  minutes, including:  ***    Jessica participated in gait training to improve functional mobility and safety for ***  minutes, including:  ***    Jessica received the following direct contact modalities after being cleared for contraindications: {AMB PT PROGRESS DIRECT CONTACT MODES:40531}    Jessica received the following supervised modalities after being cleared for contradictions: {AMB PT SUPERVISED MODES:86519}    Jessica received hot pack for *** minutes to ***.    Jessica received cold pack for *** minutes to ***.      Home Exercises Provided and Patient Education Provided     Education provided:   - ***    Written Home Exercises Provided: {Blank single:01988::"yes","Patient instructed to cont prior HEP"}.  Exercises were reviewed and Jessica was able to demonstrate them prior to the end of the session.  Jessica demonstrated {Desc; good/fair/poor:51387} understanding of the education provided.     See EMR under {Blank single:77512::"Media","Patient Instructions"} for exercises provided {Blank single:90806::"9/5/2018","prior visit"}.    Assessment     ***  Jessica {IS/IS NOT:64466} progressing well towards her goals.   Pt prognosis is {REHAB PROGNOSIS OHS:13743}.     Pt will continue to benefit from skilled outpatient physical therapy to address the deficits listed in the problem " list box on initial evaluation, provide pt/family education and to maximize pt's level of independence in the home and community environment.     Pt's spiritual, cultural and educational needs considered and pt agreeable to plan of care and goals.    Anticipated barriers to physical therapy: ***    Goals:     Short Term Goals: 4 weeks  1. Pt will demo good TA muscle contraction for improved deep abdominal strength and lumbar stability.  3. Increase lumbar ROM to WNL without pain in order to improve functional mobility.    4. Pt will demo good sitting/standing posture and body mechanics for improved spine health and decreased risk of future injury.  5. Pt to tolerate HEP to improve ROM and independence with ADL's.     Long Term Goals: 8 weeks  1. Report decreased low back pain without radiculopathy to </= 2/10  to increase tolerance for ADLs  2. Increase strength to >/= 4/5 MMT grade for BLE to increase tolerance for ADL and work activities.  3. Pt to demonstrate negative SLR and/or Slump Test in order to show improved core strength and decreased nerve/dural tension.  4. Patient's goal: No more pain  5. Pt will report at CJ level (20-40% impaired) on FOTO lumbar score for low back pain disability to demonstrate decrease in disability and improvement in back pain.            Plan     ***    Loev Vallejo, PT

## 2018-09-05 NOTE — TELEPHONE ENCOUNTER
pls call - I don't think alendronate is causing cramps.  I need more info - is back hurting?  -may be radiating from back.  Both legs?  Has she tried ibuprofen?

## 2018-09-05 NOTE — TELEPHONE ENCOUNTER
Please call patient and find out what is going on.  We did not start any new medications at last appointment, however she did stop taking Cogentin.  If she needs to come in for an appointment, you can bring her in next Wednesday.  Tell her to bring her daughter to this appointment though.

## 2018-09-05 NOTE — PROGRESS NOTES
"DAILY TREATMENT NOTE    DATE: 9/5/2018    Start Time:  1105  Stop Time:  1205    PROCEDURES:    TIMED  Procedure Min.   TE 45   MT 15   TE supervised             Total Timed Minutes:  60  Total Timed Units: 4  Total Untimed Units:  0  Charges Billed/# of units:  MT-1, TE-3      Progress/Current Status    Subjective:     Patient ID: Bhavna Landry is a 63 y.o. female.  Diagnosis:   1. Decreased ROM of lumbar spine     2. Decreased strength     3. Acute right-sided low back pain without sciatica       Pain: 4/10 R lateral gluteal area prior to session; 0/10 following session    Pt reports she had an injection last Thursday which has improved pain. Pt reports frequent cramping in L LE although adequately hydrated, often complaining of this during bridges.    Objective:     MT x 15' in L sidelying including STM/MFR to R glute med/min; brief STM to L QL, piriformis, and L sacral border (d/c next).  TE 1:1 with PT x 45' including objective measurements and exercises per log.    Palpation: TTP to R glute med/min; atrophy to B gluteal mm  Lumbar ROM:  Flexion WNL no pain  Extension major loss pain superior to R PSIS  Sidebending minimal loss concordant sign to R lateral buttock w/ L sidebend    DATE 9/4/18 8/24/18 8/23/18 8/17/2018   VISIT 4 3 2 1   POC 10/17/18        G CODE 4/10 3/10 2/10 1/10   FOTO 4/5 next 3/5 2/5 1/5   Cap Visit  Cap Total 118.42  377.47   57.78  259.05 88.10  201.30 113.20  113.20   MHP        MT               Stretches:        SKTC 3x30" B 30"x3 B 3x30" B 1x30'' B   Piriformis stretch 3x30" B 30"x3 B 30"x3 B     Hip flexor stretch NT MT MT            Supine:        LTRs 5"x10 B w/ BSB 30x 30x 10x   DKC 5"x20 w/ BSB      TAs 5" 2x10 V/TCs 5" 2x10 5"x10 Next    Brace marching NT 2x10 Next     Dead Bug LE only 3x5 B      Bridges Glute set + bridge 2x10 DL NT pain 10x   SL hip abd 2x15 B, !R 2x12 B 2x10 B 10x B   Clams 2x15 B 2x12 B 2x10 B next            Prone:        Glut sets Supine 5"x20 before " "bridges 5" 2x10 Next            Standing:        Rows NT RTC 2x12 RTC 2x10     Lat pull downs OOT next RTC 2x12 RTC 2x10 Next    Calf stretch on fitter        Leg Press 3.0 2x10 DL  Next Next           Gait               CP               INITIALS CC DH 2/6 DH 1/6 SHOBHA   !=pain  V/TCs= verbal and tactile cues    Assessment:      Increased focus on posterior chain strengthening today. Only exercise increasing pain was second set of SL hip abd on R; pain eliminated by end of session.    Patient Education/Response:     Continue HEP.  Pt verbalized understanding.    Plans and Goals:     Continue POC, focus on posterior chain strengthening.    GOALS: Short Term Goals: 4 weeks  1. Pt will demo good TA muscle contraction for improved deep abdominal strength and lumbar stability.  3. Increase lumbar ROM to WNL without pain in order to improve functional mobility.    4. Pt will demo good sitting/standing posture and body mechanics for improved spine health and decreased risk of future injury.  5. Pt to tolerate HEP to improve ROM and independence with ADL's.     Long Term Goals: 8 weeks  1. Report decreased low back pain without radiculopathy to </= 2/10  to increase tolerance for ADLs  2. Increase strength to >/= 4/5 MMT grade for BLE to increase tolerance for ADL and work activities.  3. Pt to demonstrate negative SLR and/or Slump Test in order to show improved core strength and decreased nerve/dural tension.  4. Patient's goal: No more pain  5. Pt will report at CJ level (20-40% impaired) on FOTO lumbar score for low back pain disability to demonstrate decrease in disability and improvement in back pain.        "

## 2018-09-06 ENCOUNTER — TELEPHONE (OUTPATIENT)
Dept: INTERNAL MEDICINE | Facility: CLINIC | Age: 63
End: 2018-09-06

## 2018-09-06 ENCOUNTER — TELEPHONE (OUTPATIENT)
Dept: PAIN MEDICINE | Facility: CLINIC | Age: 63
End: 2018-09-06

## 2018-09-06 RX ORDER — TIZANIDINE 4 MG/1
TABLET ORAL
Qty: 40 TABLET | Refills: 1 | Status: SHIPPED | OUTPATIENT
Start: 2018-09-06 | End: 2018-09-07 | Stop reason: SDUPTHER

## 2018-09-06 NOTE — TELEPHONE ENCOUNTER
----- Message from Zack Mora sent at 9/6/2018  9:44 AM CDT -----  Contact: [phone]  Patient requesting a call from the office to discus medication. Please call and advise, Thanks

## 2018-09-06 NOTE — TELEPHONE ENCOUNTER
Notified pt, pt says she has no memory of getting that medication and does not have the bottle. Can you send in new rx?

## 2018-09-06 NOTE — TELEPHONE ENCOUNTER
Spoke with pt regarding hip and leg pain. Pt stated she had a SI Joint Injection on 8/30/18 with 100% relief for 4 days. Since the procedure pt stated she's been experiencing bilateral leg cramping and right hip burning. I explained to pt I will give Dr. Pineda the message and I'll give her a call back. Pt verbalized understanding.

## 2018-09-06 NOTE — TELEPHONE ENCOUNTER
----- Message from Sheron Carrillo sent at 9/6/2018  7:30 AM CDT -----  Contact: [phone]/self  Patient requesting to speak with you regarding experiencing leg pain and her right hip is burning. Please advise.

## 2018-09-07 ENCOUNTER — TELEPHONE (OUTPATIENT)
Dept: PSYCHIATRY | Facility: CLINIC | Age: 63
End: 2018-09-07

## 2018-09-07 ENCOUNTER — HOSPITAL ENCOUNTER (OUTPATIENT)
Dept: RADIOLOGY | Facility: HOSPITAL | Age: 63
Discharge: HOME | End: 2018-09-07
Attending: PAIN MEDICINE
Payer: MEDICARE

## 2018-09-07 ENCOUNTER — TELEPHONE (OUTPATIENT)
Dept: REHABILITATION | Facility: HOSPITAL | Age: 63
End: 2018-09-07

## 2018-09-07 ENCOUNTER — TELEPHONE (OUTPATIENT)
Dept: PAIN MEDICINE | Facility: CLINIC | Age: 63
End: 2018-09-07

## 2018-09-07 ENCOUNTER — OFFICE VISIT (OUTPATIENT)
Dept: PAIN MEDICINE | Facility: CLINIC | Age: 63
End: 2018-09-07
Payer: MEDICARE

## 2018-09-07 VITALS
WEIGHT: 101 LBS | DIASTOLIC BLOOD PRESSURE: 85 MMHG | BODY MASS INDEX: 18.47 KG/M2 | SYSTOLIC BLOOD PRESSURE: 163 MMHG | HEART RATE: 77 BPM

## 2018-09-07 DIAGNOSIS — M53.3 SACROILIAC JOINT DYSFUNCTION: Primary | ICD-10-CM

## 2018-09-07 DIAGNOSIS — M79.18 MYOFASCIAL PAIN SYNDROME: ICD-10-CM

## 2018-09-07 DIAGNOSIS — M53.3 SACROILIAC JOINT DYSFUNCTION: ICD-10-CM

## 2018-09-07 DIAGNOSIS — G89.4 CHRONIC PAIN SYNDROME: ICD-10-CM

## 2018-09-07 PROCEDURE — 3008F BODY MASS INDEX DOCD: CPT | Mod: CPTII,,, | Performed by: PAIN MEDICINE

## 2018-09-07 PROCEDURE — 99214 OFFICE O/P EST MOD 30 MIN: CPT | Mod: S$PBB,,, | Performed by: PAIN MEDICINE

## 2018-09-07 PROCEDURE — 72200 X-RAY EXAM SI JOINTS: CPT | Mod: TC,FY

## 2018-09-07 PROCEDURE — 99999 PR PBB SHADOW E&M-EST. PATIENT-LVL III: CPT | Mod: PBBFAC,,, | Performed by: PAIN MEDICINE

## 2018-09-07 PROCEDURE — 3077F SYST BP >= 140 MM HG: CPT | Mod: CPTII,,, | Performed by: PAIN MEDICINE

## 2018-09-07 PROCEDURE — 1101F PT FALLS ASSESS-DOCD LE1/YR: CPT | Mod: CPTII,,, | Performed by: PAIN MEDICINE

## 2018-09-07 PROCEDURE — 72200 X-RAY EXAM SI JOINTS: CPT | Mod: 26,,, | Performed by: RADIOLOGY

## 2018-09-07 PROCEDURE — 99213 OFFICE O/P EST LOW 20 MIN: CPT | Mod: PBBFAC,25,PO | Performed by: PAIN MEDICINE

## 2018-09-07 PROCEDURE — 3079F DIAST BP 80-89 MM HG: CPT | Mod: CPTII,,, | Performed by: PAIN MEDICINE

## 2018-09-07 RX ORDER — BENZTROPINE MESYLATE 0.5 MG/1
0.5 TABLET ORAL 2 TIMES DAILY
Qty: 60 TABLET | Refills: 11 | Status: SHIPPED | OUTPATIENT
Start: 2018-09-07 | End: 2018-12-10 | Stop reason: SDUPTHER

## 2018-09-07 RX ORDER — TRIHEXYPHENIDYL HYDROCHLORIDE 2 MG/1
2 TABLET ORAL 2 TIMES DAILY WITH MEALS
Qty: 60 TABLET | Refills: 3 | Status: SHIPPED | OUTPATIENT
Start: 2018-09-07 | End: 2018-12-07

## 2018-09-07 RX ORDER — TIZANIDINE 4 MG/1
TABLET ORAL
Qty: 276 TABLET | Refills: 0 | Status: ON HOLD | OUTPATIENT
Start: 2018-09-07 | End: 2018-12-18 | Stop reason: HOSPADM

## 2018-09-07 NOTE — PROGRESS NOTES
[facility] New Patient Evaluation    Referred by: Sadaf Vargas MD  Reason for referral:M54.9,G89.29 (ICD-10-CM) - Chronic right-sided back pain, unspecified back location  CC:   Chief Complaint   Patient presents with    Low-back Pain       Last 3 PDI Scores 9/7/2018 8/13/2018   Pain Disability Index (PDI) 32 42     Interval Update:  9/7/18 - Pt returns to clinic reporting severely exacerbated low back pain bilaterally following the last session of physical therapy.  She endorses sharp, achy, stabbing pain in the areas of the SI joints and would like to repeat the SI injection she received a few weeks ago.  She reported 100% relief of lbp following the SI injection that last up until a few days ago when she experienced the exacerbation.  Overall, her PDI is improved which shows progress.    Background:  Bhavna Landry is a 63 y.o. female referred for right sided back pain to right upper buttocks.  She points to the middle of the right buttock 1-2 inches to the right of midline.    Location: back  Severity: Currently: 7/10   Typical Range: 7/10     Exacerbation: 7/10   Onset: 3-4 months  Quality: Burning and constant  Radiation: none  Axial/Extremity Percentage of Pain: 100%  Exacerbating Factors: prolonged sitting, standing, rolling over in bed  Mitigating Factors: aleve  Assoc: denies night fever/night sweats, urinary incontinence, bowel incontinence, significant weight loss, significant motor weakness and loss of sensations    Previous Therapies:  PT:  Scheduled to start this coming Friday 8/17/18  HEP:  tries  TENS: none  Injections: Low Back 10-15 yrs ago, with relief - unsure of which injection she had  Surgery:    Right TKA and revision due to nickel allergy  Medications:   - NSAIDS: Yes  - MSK Relaxants: Yes, tizanidine  - TCAs:   - SNRIs:   - Topicals:   - Anticonvulsants:  - Opioids:     Current Pain Medications:  1. Aleve     Full Medication List:    Current Outpatient Medications:      albuterol 90 mcg/actuation inhaler, Inhale 2 puffs into the lungs every 6 (six) hours as needed for Wheezing., Disp: 1 each, Rfl: 11    alendronate (FOSAMAX) 70 MG tablet, Take 1 tablet (70 mg total) by mouth every 7 days., Disp: 4 tablet, Rfl: 11    amLODIPine (NORVASC) 5 MG tablet, TK 1 T PO QD, Disp: , Rfl: 11    aspirin (ECOTRIN) 81 MG EC tablet, Take 81 mg by mouth once daily., Disp: , Rfl:     atorvastatin (LIPITOR) 40 MG tablet, Take 1 tablet (40 mg total) by mouth once daily., Disp: 30 tablet, Rfl: 11    benztropine (COGENTIN) 0.5 MG tablet, Take 1 tablet (0.5 mg total) by mouth 2 (two) times daily., Disp: 60 tablet, Rfl: 11    butalbital-acetaminophen-caff -40 mg Cap, TK ONE C PO Q 8 H PRF HA, Disp: , Rfl: 3    butalbital-acetaminophen-caffeine -40 mg (FIORICET, ESGIC) -40 mg per tablet, , Disp: , Rfl: 1    multivitamin capsule, Take 1 capsule by mouth once daily., Disp: , Rfl:     ropinirole (REQUIP) 3 MG tablet, Take 1 tablet 2 hours before bedtime, Disp: 30 tablet, Rfl: 11    sumatriptan (IMITREX) 50 MG tablet, TK 1 T PO ONCE PRF MIGRAINE HA, Disp: , Rfl: 5    thiothixene (NAVANE) 10 MG capsule, Take 1 capsule (10 mg total) by mouth 2 (two) times daily., Disp: 60 capsule, Rfl: 5    tiZANidine (ZANAFLEX) 4 MG tablet, 1 tab po q 8 h prn leg cramps, Disp: 40 tablet, Rfl: 1    topiramate (TOPAMAX) 25 MG tablet, Take 75 mg by mouth 2 (two) times daily., Disp: , Rfl:     trihexyphenidyl (ARTANE) 2 MG tablet, Take 1 tablet (2 mg total) by mouth 2 (two) times daily with meals., Disp: 60 tablet, Rfl: 3    venlafaxine (EFFEXOR-XR) 150 MG Cp24, Take 1 capsule (150 mg total) by mouth once daily., Disp: 30 capsule, Rfl: 5     Review of Systems:  Review of Systems   Constitutional: Negative for chills and fever.   HENT: Negative for nosebleeds.    Eyes: Negative for pain.   Respiratory: Negative for hemoptysis.    Cardiovascular: Negative for chest pain.   Gastrointestinal: Negative  for nausea and vomiting.   Genitourinary: Negative for dysuria.   Musculoskeletal: Positive for back pain and joint pain. Negative for falls.   Skin: Negative for rash.   Neurological: Negative for tingling and focal weakness.   Endo/Heme/Allergies: Does not bruise/bleed easily.   Psychiatric/Behavioral: Negative for depression. The patient is not nervous/anxious.        Allergies:  Adhesive; Chloromycetin [chloramphenicol sod succinate]; Etanercept; and Nickel sutures [surgical stainless steel]     Medical History:  Past Medical History:   Diagnosis Date    Allergy     Amblyopia     Anemia     Arthritis 1992    Cataract     Depression     Dry eyes     Dry mouth     Fibromyalgia 2014    Fibromyalgia     GERD (gastroesophageal reflux disease)     History of psychiatric hospitalization     Hyperlipidemia 1992    Hypertension     Kidney stone     Migraine headache     Osteoporosis     Psoriatic arthritis 1992    Right knee pain     post knee replacement surgery (possible rejectiion of metal)    RLS (restless legs syndrome)     Schizophrenia 1992    stable on meds    Urinary tract infection         Surgical History:  Past Surgical History:   Procedure Laterality Date     SECTION      cyst removed from right sinus  1982    HYSTERECTOMY      VAGINAL HYSTERECTOMY WITHOUT BSO - ENDOMETRIOSIS    JOINT REPLACEMENT Right     knee    KNEE SURGERY      Surgery on right knee  1982    tumor removed from back left side upper shoulder  2006        Social History:  Social History     Socioeconomic History    Marital status:      Spouse name: Not on file    Number of children: 1    Years of education: Not on file    Highest education level: Not on file   Social Needs    Financial resource strain: Not on file    Food insecurity - worry: Not on file    Food insecurity - inability: Not on file    Transportation needs - medical: Not on file     Transportation needs - non-medical: Not on file   Occupational History    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     Employer: 2010   Tobacco Use    Smoking status: Former Smoker     Packs/day: 0.50     Years: 10.00     Pack years: 5.00     Types: Cigarettes     Last attempt to quit: 2012     Years since quittin.3    Smokeless tobacco: Former User    Tobacco comment: stopped smoking .     Substance and Sexual Activity    Alcohol use: No    Drug use: No    Sexual activity: No     Birth control/protection: Post-menopausal   Other Topics Concern    Patient feels they ought to cut down on drinking/drug use Not Asked    Patient annoyed by others criticizing their drinking/drug use Not Asked    Patient has felt bad or guilty about drinking/drug use Not Asked    Patient has had a drink/used drugs as an eye opener in the AM Not Asked    Are you pregnant or think you may be? Not Asked    Breast-feeding Not Asked   Social History Narrative    Exercise:  Childcare.        Ett:  3 days a week       Physical Exam:  Vitals:    18 1137   BP: (!) 163/85   Pulse: 77   Weight: 45.8 kg (101 lb)   PainSc:   8     General    Nursing note and vitals reviewed.  Constitutional: She is oriented to person, place, and time. She appears well-developed and well-nourished. No distress.   HENT:   Head: Normocephalic and atraumatic.   Nose: Nose normal.   Eyes: Conjunctivae and EOM are normal. Pupils are equal, round, and reactive to light. Right eye exhibits no discharge. Left eye exhibits no discharge. No scleral icterus.   Neck: No JVD present.   Cardiovascular: Intact distal pulses.    Pulmonary/Chest: Effort normal. No respiratory distress.   Abdominal: She exhibits no distension.   Neurological: She is alert and oriented to person, place, and time. Coordination normal.   Psychiatric: She has a normal mood and affect. Her behavior is normal. Judgment and thought content normal.      General Musculoskeletal Exam   Gait: antalgic         Right Hip Exam   Right hip exam is normal.     Tenderness   The patient tender to palpation of the SI joint.    Tests   Pain w/ forced internal rotation (HELIO): present  Back (L-Spine & T-Spine) / Neck (C-Spine) Exam     Tenderness Right paramedian tenderness of the Sacrum.         Imagin[facility] - X-Ray Lumbar Spine Ap And Lateral   Narrative    EXAMINATION:  XR LUMBAR SPINE AP AND LATERAL    CLINICAL HISTORY:  Low back pain, >6wks conservative tx, persistent-progressive sx, surgical candidate;pain r low back;Dorsalgia, unspecified    TECHNIQUE:  AP, lateral and spot images were performed of the lumbar spine.    COMPARISON:  None    FINDINGS:  DJD and lumbar scoliosis.  The L1/L2 and the L2/L3 disc spaces are significantly narrowed.  No fracture, spondylolisthesis or bone destruction identified        Labs:  BMP  Lab Results   Component Value Date     (L) 2018    K 4.3 2018     2018    CO2 22 (L) 2018    BUN 11 2018    CREATININE 1.0 2018    CALCIUM 9.6 2018    ANIONGAP 9 2018    ESTGFRAFRICA >60.0 2018    EGFRNONAA 59.3 (A) 2018     Lab Results   Component Value Date    ALT 13 2018    AST 22 2018    GGT 91 (H) 2014    ALKPHOS 81 2018    BILITOT 0.2 2018       Assessment:  Problem List Items Addressed This Visit     Sacroiliac joint dysfunction - Primary    Relevant Orders    X-Ray Sacroiliac Joints 3 Views    Chronic pain    Myofascial pain syndrome          This is a 63-year-old female with chronic low back pain the past 4 months.  The pain is nonradiating and exacerbated by prolonged sitting, standing, rolling over in bed.  Her physical exam demonstrates a positive HELIO sign on the right.  The history and exam are consistent with right sacroiliac joint dysfunction.  The patient states pain limits her ability to accomplish some activities of daily  living and I have recommended a right sacroiliac joint injection for diagnostic and therapeutic purposes.  This injection will also facilitate her participation in physical therapy which is scheduled to start this coming Friday, August 17th.    Treatment Plan:   PT/OT/HEP: I requested that she return to PT after the injection even though she associates it with increasing her pain.  Procedures: BILATERAL SI injection x1  Medications: No changes recommended at this time.  Imaging: No additional imaging recommended at this time.  Labs: Reviewed.  Medications are appropriately dosed for current hepatorenal function.    Follow Up: RTC p injection    Michelle Pineda Jr, MD  Interventional Pain Medicine / Anesthesiology    Disclaimer: This note was partly generated using dictation software which may occasionally result in transcription errors.

## 2018-09-07 NOTE — TELEPHONE ENCOUNTER
----- Message from Mary Alba sent at 9/6/2018  5:39 PM CDT -----  Contact: self, [phone]  Patient states she had an injection in her back last week, went to physical therapy this week ans has been hurting having leg cramps since then. Patient requests to be seen tomorrow at around 1 pm if possible. Please advise.

## 2018-09-07 NOTE — TELEPHONE ENCOUNTER
Spoke with pt regarding low back pain. Pt state she's in a lot of pain and would like to be seen today. Pt is scheduled to see Dr. Pineda today for 11:30 am. Pt verbalized understanding and confirmed appt date and time.

## 2018-09-07 NOTE — TELEPHONE ENCOUNTER
She really needs to restart he Cogentin.  I have sent a new prescription to her pharmacy.  Tell her to see how much it costs to see if she can at least restart it for 1 month until we find an alternative.  Her pharmacist may be able to tell us what her insurance covers instead if she can not afford the Cogentin.

## 2018-09-07 NOTE — H&P (VIEW-ONLY)
[facility] New Patient Evaluation    Referred by: Sadaf Vargas MD  Reason for referral:M54.9,G89.29 (ICD-10-CM) - Chronic right-sided back pain, unspecified back location  CC:   Chief Complaint   Patient presents with    Low-back Pain       Last 3 PDI Scores 9/7/2018 8/13/2018   Pain Disability Index (PDI) 32 42     Interval Update:  9/7/18 - Pt returns to clinic reporting severely exacerbated low back pain bilaterally following the last session of physical therapy.  She endorses sharp, achy, stabbing pain in the areas of the SI joints and would like to repeat the SI injection she received a few weeks ago.  She reported 100% relief of lbp following the SI injection that last up until a few days ago when she experienced the exacerbation.  Overall, her PDI is improved which shows progress.    Background:  Bhavna Landry is a 63 y.o. female referred for right sided back pain to right upper buttocks.  She points to the middle of the right buttock 1-2 inches to the right of midline.    Location: back  Severity: Currently: 7/10   Typical Range: 7/10     Exacerbation: 7/10   Onset: 3-4 months  Quality: Burning and constant  Radiation: none  Axial/Extremity Percentage of Pain: 100%  Exacerbating Factors: prolonged sitting, standing, rolling over in bed  Mitigating Factors: aleve  Assoc: denies night fever/night sweats, urinary incontinence, bowel incontinence, significant weight loss, significant motor weakness and loss of sensations    Previous Therapies:  PT:  Scheduled to start this coming Friday 8/17/18  HEP:  tries  TENS: none  Injections: Low Back 10-15 yrs ago, with relief - unsure of which injection she had  Surgery:    Right TKA and revision due to nickel allergy  Medications:   - NSAIDS: Yes  - MSK Relaxants: Yes, tizanidine  - TCAs:   - SNRIs:   - Topicals:   - Anticonvulsants:  - Opioids:     Current Pain Medications:  1. Aleve     Full Medication List:    Current Outpatient Medications:      albuterol 90 mcg/actuation inhaler, Inhale 2 puffs into the lungs every 6 (six) hours as needed for Wheezing., Disp: 1 each, Rfl: 11    alendronate (FOSAMAX) 70 MG tablet, Take 1 tablet (70 mg total) by mouth every 7 days., Disp: 4 tablet, Rfl: 11    amLODIPine (NORVASC) 5 MG tablet, TK 1 T PO QD, Disp: , Rfl: 11    aspirin (ECOTRIN) 81 MG EC tablet, Take 81 mg by mouth once daily., Disp: , Rfl:     atorvastatin (LIPITOR) 40 MG tablet, Take 1 tablet (40 mg total) by mouth once daily., Disp: 30 tablet, Rfl: 11    benztropine (COGENTIN) 0.5 MG tablet, Take 1 tablet (0.5 mg total) by mouth 2 (two) times daily., Disp: 60 tablet, Rfl: 11    butalbital-acetaminophen-caff -40 mg Cap, TK ONE C PO Q 8 H PRF HA, Disp: , Rfl: 3    butalbital-acetaminophen-caffeine -40 mg (FIORICET, ESGIC) -40 mg per tablet, , Disp: , Rfl: 1    multivitamin capsule, Take 1 capsule by mouth once daily., Disp: , Rfl:     ropinirole (REQUIP) 3 MG tablet, Take 1 tablet 2 hours before bedtime, Disp: 30 tablet, Rfl: 11    sumatriptan (IMITREX) 50 MG tablet, TK 1 T PO ONCE PRF MIGRAINE HA, Disp: , Rfl: 5    thiothixene (NAVANE) 10 MG capsule, Take 1 capsule (10 mg total) by mouth 2 (two) times daily., Disp: 60 capsule, Rfl: 5    tiZANidine (ZANAFLEX) 4 MG tablet, 1 tab po q 8 h prn leg cramps, Disp: 40 tablet, Rfl: 1    topiramate (TOPAMAX) 25 MG tablet, Take 75 mg by mouth 2 (two) times daily., Disp: , Rfl:     trihexyphenidyl (ARTANE) 2 MG tablet, Take 1 tablet (2 mg total) by mouth 2 (two) times daily with meals., Disp: 60 tablet, Rfl: 3    venlafaxine (EFFEXOR-XR) 150 MG Cp24, Take 1 capsule (150 mg total) by mouth once daily., Disp: 30 capsule, Rfl: 5     Review of Systems:  Review of Systems   Constitutional: Negative for chills and fever.   HENT: Negative for nosebleeds.    Eyes: Negative for pain.   Respiratory: Negative for hemoptysis.    Cardiovascular: Negative for chest pain.   Gastrointestinal: Negative  for nausea and vomiting.   Genitourinary: Negative for dysuria.   Musculoskeletal: Positive for back pain and joint pain. Negative for falls.   Skin: Negative for rash.   Neurological: Negative for tingling and focal weakness.   Endo/Heme/Allergies: Does not bruise/bleed easily.   Psychiatric/Behavioral: Negative for depression. The patient is not nervous/anxious.        Allergies:  Adhesive; Chloromycetin [chloramphenicol sod succinate]; Etanercept; and Nickel sutures [surgical stainless steel]     Medical History:  Past Medical History:   Diagnosis Date    Allergy     Amblyopia     Anemia     Arthritis 1992    Cataract     Depression     Dry eyes     Dry mouth     Fibromyalgia 2014    Fibromyalgia     GERD (gastroesophageal reflux disease)     History of psychiatric hospitalization     Hyperlipidemia 1992    Hypertension     Kidney stone     Migraine headache     Osteoporosis     Psoriatic arthritis 1992    Right knee pain     post knee replacement surgery (possible rejectiion of metal)    RLS (restless legs syndrome)     Schizophrenia 1992    stable on meds    Urinary tract infection         Surgical History:  Past Surgical History:   Procedure Laterality Date     SECTION      cyst removed from right sinus  1982    HYSTERECTOMY      VAGINAL HYSTERECTOMY WITHOUT BSO - ENDOMETRIOSIS    JOINT REPLACEMENT Right     knee    KNEE SURGERY      Surgery on right knee  1982    tumor removed from back left side upper shoulder  2006        Social History:  Social History     Socioeconomic History    Marital status:      Spouse name: Not on file    Number of children: 1    Years of education: Not on file    Highest education level: Not on file   Social Needs    Financial resource strain: Not on file    Food insecurity - worry: Not on file    Food insecurity - inability: Not on file    Transportation needs - medical: Not on file     Transportation needs - non-medical: Not on file   Occupational History    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     Employer: 2010   Tobacco Use    Smoking status: Former Smoker     Packs/day: 0.50     Years: 10.00     Pack years: 5.00     Types: Cigarettes     Last attempt to quit: 2012     Years since quittin.3    Smokeless tobacco: Former User    Tobacco comment: stopped smoking .     Substance and Sexual Activity    Alcohol use: No    Drug use: No    Sexual activity: No     Birth control/protection: Post-menopausal   Other Topics Concern    Patient feels they ought to cut down on drinking/drug use Not Asked    Patient annoyed by others criticizing their drinking/drug use Not Asked    Patient has felt bad or guilty about drinking/drug use Not Asked    Patient has had a drink/used drugs as an eye opener in the AM Not Asked    Are you pregnant or think you may be? Not Asked    Breast-feeding Not Asked   Social History Narrative    Exercise:  Childcare.        Ett:  3 days a week       Physical Exam:  Vitals:    18 1137   BP: (!) 163/85   Pulse: 77   Weight: 45.8 kg (101 lb)   PainSc:   8     General    Nursing note and vitals reviewed.  Constitutional: She is oriented to person, place, and time. She appears well-developed and well-nourished. No distress.   HENT:   Head: Normocephalic and atraumatic.   Nose: Nose normal.   Eyes: Conjunctivae and EOM are normal. Pupils are equal, round, and reactive to light. Right eye exhibits no discharge. Left eye exhibits no discharge. No scleral icterus.   Neck: No JVD present.   Cardiovascular: Intact distal pulses.    Pulmonary/Chest: Effort normal. No respiratory distress.   Abdominal: She exhibits no distension.   Neurological: She is alert and oriented to person, place, and time. Coordination normal.   Psychiatric: She has a normal mood and affect. Her behavior is normal. Judgment and thought content normal.      General Musculoskeletal Exam   Gait: antalgic         Right Hip Exam   Right hip exam is normal.     Tenderness   The patient tender to palpation of the SI joint.    Tests   Pain w/ forced internal rotation (HELIO): present  Back (L-Spine & T-Spine) / Neck (C-Spine) Exam     Tenderness Right paramedian tenderness of the Sacrum.         Imagin18 - X-Ray Lumbar Spine Ap And Lateral   Narrative    EXAMINATION:  XR LUMBAR SPINE AP AND LATERAL    CLINICAL HISTORY:  Low back pain, >6wks conservative tx, persistent-progressive sx, surgical candidate;pain r low back;Dorsalgia, unspecified    TECHNIQUE:  AP, lateral and spot images were performed of the lumbar spine.    COMPARISON:  None    FINDINGS:  DJD and lumbar scoliosis.  The L1/L2 and the L2/L3 disc spaces are significantly narrowed.  No fracture, spondylolisthesis or bone destruction identified        Labs:  BMP  Lab Results   Component Value Date     (L) 2018    K 4.3 2018     2018    CO2 22 (L) 2018    BUN 11 2018    CREATININE 1.0 2018    CALCIUM 9.6 2018    ANIONGAP 9 2018    ESTGFRAFRICA >60.0 2018    EGFRNONAA 59.3 (A) 2018     Lab Results   Component Value Date    ALT 13 2018    AST 22 2018    GGT 91 (H) 2014    ALKPHOS 81 2018    BILITOT 0.2 2018       Assessment:  Problem List Items Addressed This Visit     Sacroiliac joint dysfunction - Primary    Relevant Orders    X-Ray Sacroiliac Joints 3 Views    Chronic pain    Myofascial pain syndrome          This is a 63-year-old female with chronic low back pain the past 4 months.  The pain is nonradiating and exacerbated by prolonged sitting, standing, rolling over in bed.  Her physical exam demonstrates a positive HELIO sign on the right.  The history and exam are consistent with right sacroiliac joint dysfunction.  The patient states pain limits her ability to accomplish some activities of daily  living and I have recommended a right sacroiliac joint injection for diagnostic and therapeutic purposes.  This injection will also facilitate her participation in physical therapy which is scheduled to start this coming Friday, August 17th.    Treatment Plan:   PT/OT/HEP: I requested that she return to PT after the injection even though she associates it with increasing her pain.  Procedures: BILATERAL SI injection x1  Medications: No changes recommended at this time.  Imaging: No additional imaging recommended at this time.  Labs: Reviewed.  Medications are appropriately dosed for current hepatorenal function.    Follow Up: RTC p injection    Michelle Pineda Jr, MD  Interventional Pain Medicine / Anesthesiology    Disclaimer: This note was partly generated using dictation software which may occasionally result in transcription errors.

## 2018-09-07 NOTE — TELEPHONE ENCOUNTER
It appears her insurance may cover Artane.  I'll send a prescription for Artane.  Will you call her pharmacy and clarify what her insurance covers.  If it covers Artane, please cancel Cogentin prescription.  Please relay message to patient as well.  Tell her to keep us updated on her symptoms.  If symptoms continue, she needs to come in for an appointment.  If she would prefer to schedule Urgent visit with NP today or next week, please offer that as well.

## 2018-09-10 ENCOUNTER — TELEPHONE (OUTPATIENT)
Dept: INTERNAL MEDICINE | Facility: CLINIC | Age: 63
End: 2018-09-10

## 2018-09-10 RX ORDER — TRAMADOL HYDROCHLORIDE 50 MG/1
TABLET ORAL
Qty: 40 TABLET | Refills: 0 | Status: SHIPPED | OUTPATIENT
Start: 2018-09-10 | End: 2018-09-20 | Stop reason: SDUPTHER

## 2018-09-10 NOTE — TELEPHONE ENCOUNTER
----- Message from Angela Fallon sent at 9/10/2018  2:13 PM CDT -----  Contact: Bhavna Gabby [phone]  Bhavna would like a call back in regards to her going to the pain management doctor. Bhavna states he did not give her anything for pain and she wont receive the shots to relive the pain until the 27th. Bhavna would like to receive a script for Tramadol 50 mg.

## 2018-09-18 ENCOUNTER — TELEPHONE (OUTPATIENT)
Dept: INTERNAL MEDICINE | Facility: CLINIC | Age: 63
End: 2018-09-18

## 2018-09-18 NOTE — TELEPHONE ENCOUNTER
----- Message from Etienne Sood sent at 9/18/2018  8:42 AM CDT -----  Contact: Patient [phone]  Patient stating [facility] sent Forms to fill out for Rx the will be needing which is  Rx Benztropine Tab 0.5 MG, [employer] will supply the above Rx  and also wanting to know the Rx that did not work for patient or that made patient sick from the past.    Please call an advise  Thank you

## 2018-09-19 NOTE — TELEPHONE ENCOUNTER
Pt states that the medication alendronate (FOSAMAX) 70 MG tablet makes her very tired and makes her feel sick as if she has a cold or flu. Pls advise. Also pended meds

## 2018-09-19 NOTE — TELEPHONE ENCOUNTER
----- Message from Alexys Meza sent at 9/19/2018 10:49 AM CDT -----  Contact: self/[phone]  Pt states that the medication alendronate (FOSAMAX) 70 MG tablet makes her very tired and makes her feel sick as if she has a cold or flu. Pt is also requesting a refill on medication traMADol (ULTRAM) 50 mg tablet sent to [facility] [address] [place] [address], [place] [address] AT [facility] [place] [address]. Please advise.      Thanks

## 2018-09-19 NOTE — TELEPHONE ENCOUNTER
pls oscar - she got #40 sept 10  Is she out - how many is she taking daily?    Continue with fosamax for another month - lets see if she gets used to it.  Other options are injections - yearly or every 6m onths.

## 2018-09-20 ENCOUNTER — TELEPHONE (OUTPATIENT)
Dept: INTERNAL MEDICINE | Facility: CLINIC | Age: 63
End: 2018-09-20

## 2018-09-20 DIAGNOSIS — M54.9 BACK PAIN, UNSPECIFIED BACK LOCATION, UNSPECIFIED BACK PAIN LATERALITY, UNSPECIFIED CHRONICITY: Primary | ICD-10-CM

## 2018-09-20 RX ORDER — TRAMADOL HYDROCHLORIDE 50 MG/1
TABLET ORAL
Qty: 80 TABLET | Refills: 0 | Status: SHIPPED | OUTPATIENT
Start: 2018-09-20 | End: 2018-11-14 | Stop reason: SDUPTHER

## 2018-09-20 RX ORDER — TRAMADOL HYDROCHLORIDE 50 MG/1
TABLET ORAL
Qty: 40 TABLET | Refills: 0 | OUTPATIENT
Start: 2018-09-20

## 2018-09-20 NOTE — TELEPHONE ENCOUNTER
pls call - she needs to f/u pain management and complete course of PT.  If she didn't have Pain management helpful I'd like her to se Phys MEd and rehab doctor    I don't not want her to take pain meds chronically.

## 2018-09-20 NOTE — TELEPHONE ENCOUNTER
----- Message from Yenni Nagel sent at 9/20/2018  9:04 AM CDT -----  Contact: Montse/[facility]/ [phone]  [facility] is requesting a PA for patient 's medication   benztropine (COGENTIN) 0.5 MG tablet.    Please advise, thank you

## 2018-09-20 NOTE — TELEPHONE ENCOUNTER
Pt called and I advised her of msg below. Pt is seeing back and spine this Wednesday. Pt said thank you for refilling her tramadol.

## 2018-09-20 NOTE — TELEPHONE ENCOUNTER
Pt called regarding the status of her tramadol refill. I also advised pt of your message from yesterday. Pt stated she is out of Tramadol. She takes 2 tablets every 12 hours.

## 2018-09-24 RX ORDER — AMLODIPINE BESYLATE 5 MG/1
TABLET ORAL
Qty: 30 TABLET | Refills: 11 | Status: ON HOLD | OUTPATIENT
Start: 2018-09-24 | End: 2018-12-18 | Stop reason: HOSPADM

## 2018-09-25 ENCOUNTER — TELEPHONE (OUTPATIENT)
Dept: SPINE | Facility: CLINIC | Age: 63
End: 2018-09-25

## 2018-09-25 DIAGNOSIS — M54.5 LOW BACK PAIN, UNSPECIFIED BACK PAIN LATERALITY, UNSPECIFIED CHRONICITY, WITH SCIATICA PRESENCE UNSPECIFIED: Primary | ICD-10-CM

## 2018-09-25 NOTE — TELEPHONE ENCOUNTER
Called and spoke with patient and informed her that she will need to arrive an hour early for her xrays.  Patient verbalized understanding.

## 2018-09-25 NOTE — TELEPHONE ENCOUNTER
----- Message from Unique Arroyo sent at 9/25/2018 11:40 AM CDT -----            Name of Who is Calling:PHUONG HAZEL [[id number]]  What is the request in detail:     NP appt tomorrow does she need XR you looked at it but didn't order any   please call          Can the clinic reply by [facility]:no    What Number to Call Back if not in [facility]:[phone]

## 2018-09-26 ENCOUNTER — HOSPITAL ENCOUNTER (OUTPATIENT)
Dept: RADIOLOGY | Facility: OTHER | Age: 63
Discharge: HOME | End: 2018-09-26
Attending: PHYSICIAN ASSISTANT
Payer: MEDICARE

## 2018-09-26 ENCOUNTER — OFFICE VISIT (OUTPATIENT)
Dept: SPINE | Facility: CLINIC | Age: 63
End: 2018-09-26
Payer: MEDICARE

## 2018-09-26 VITALS
HEIGHT: 62 IN | WEIGHT: 101 LBS | BODY MASS INDEX: 18.58 KG/M2 | SYSTOLIC BLOOD PRESSURE: 144 MMHG | DIASTOLIC BLOOD PRESSURE: 75 MMHG | HEART RATE: 77 BPM

## 2018-09-26 DIAGNOSIS — M54.50 ACUTE BILATERAL LOW BACK PAIN WITHOUT SCIATICA: ICD-10-CM

## 2018-09-26 DIAGNOSIS — M47.816 SPONDYLOSIS OF LUMBAR REGION WITHOUT MYELOPATHY OR RADICULOPATHY: ICD-10-CM

## 2018-09-26 DIAGNOSIS — M46.1 SACROILIITIS: Primary | ICD-10-CM

## 2018-09-26 DIAGNOSIS — M41.25 OTHER IDIOPATHIC SCOLIOSIS, THORACOLUMBAR REGION: ICD-10-CM

## 2018-09-26 DIAGNOSIS — M54.5 LOW BACK PAIN, UNSPECIFIED BACK PAIN LATERALITY, UNSPECIFIED CHRONICITY, WITH SCIATICA PRESENCE UNSPECIFIED: ICD-10-CM

## 2018-09-26 PROCEDURE — 99204 OFFICE O/P NEW MOD 45 MIN: CPT | Mod: S$PBB,,, | Performed by: PHYSICIAN ASSISTANT

## 2018-09-26 PROCEDURE — 99999 PR PBB SHADOW E&M-EST. PATIENT-LVL IV: CPT | Mod: PBBFAC,,, | Performed by: PHYSICIAN ASSISTANT

## 2018-09-26 PROCEDURE — 99214 OFFICE O/P EST MOD 30 MIN: CPT | Mod: PBBFAC,25 | Performed by: PHYSICIAN ASSISTANT

## 2018-09-26 PROCEDURE — 3008F BODY MASS INDEX DOCD: CPT | Mod: CPTII,,, | Performed by: PHYSICIAN ASSISTANT

## 2018-09-26 PROCEDURE — 3078F DIAST BP <80 MM HG: CPT | Mod: CPTII,,, | Performed by: PHYSICIAN ASSISTANT

## 2018-09-26 PROCEDURE — 1101F PT FALLS ASSESS-DOCD LE1/YR: CPT | Mod: CPTII,,, | Performed by: PHYSICIAN ASSISTANT

## 2018-09-26 PROCEDURE — 72082 X-RAY EXAM ENTIRE SPI 2/3 VW: CPT | Mod: 26,,, | Performed by: RADIOLOGY

## 2018-09-26 PROCEDURE — 72082 X-RAY EXAM ENTIRE SPI 2/3 VW: CPT | Mod: TC,FY

## 2018-09-26 PROCEDURE — 99499 UNLISTED E&M SERVICE: CPT | Mod: S$GLB,,, | Performed by: PHYSICIAN ASSISTANT

## 2018-09-26 PROCEDURE — 3077F SYST BP >= 140 MM HG: CPT | Mod: CPTII,,, | Performed by: PHYSICIAN ASSISTANT

## 2018-09-26 RX ORDER — ASPIRIN 325 MG
325 TABLET ORAL DAILY
COMMUNITY
End: 2019-11-20

## 2018-09-26 NOTE — DISCHARGE INSTRUCTIONS
Home Care Instructions Pain Management:    1.  DIET:    You may resume your normal diet today.    2.  BATHING:    You may shower with luke warm water.    3.  DRESSING:    You may remove your bandage today.    4.  ACTIVITY LEVEL:      You may resume your normal activities 24 hours after your procedure.    5.  MEDICATIONS:    You may resume your normal medications today.    6.  SPECIAL INSTRUCTIONS:    No heat to the injection site for 24 hours including bath or shower, heating pad, moist heat or hot tubs.    Use an ice pack to the injection site for any pain or discomfort.  Apply ice packs for 20 minute intervals as needed.    If you have received any sedatives by mouth today, you can not drive for 12 hours.    If you have received sedation through an IV, you can not drive for 24 hours.    PLEASE CALL YOUR DOCTOR FOR THE FOLLOWIN.  Redness or swelling around the injection site.  2.  Fever of 101 degrees.  3.  Drainage (pus) from the injection site.  4.  For any continuous bleeding (some dried blood over the incision is normal.)    FOR EMERGENCIES:    If any unusual problems or difficulties occur during clinic hours, call [phone] or dial [phone].    Follow up with with your physician in 2-3 weeks.

## 2018-09-27 ENCOUNTER — HOSPITAL ENCOUNTER (OUTPATIENT)
Facility: HOSPITAL | Age: 63
Discharge: HOME | End: 2018-09-27
Attending: PAIN MEDICINE | Admitting: PAIN MEDICINE
Payer: MEDICARE

## 2018-09-27 VITALS
WEIGHT: 103 LBS | DIASTOLIC BLOOD PRESSURE: 66 MMHG | TEMPERATURE: 98 F | HEART RATE: 72 BPM | BODY MASS INDEX: 18.95 KG/M2 | OXYGEN SATURATION: 99 % | SYSTOLIC BLOOD PRESSURE: 140 MMHG | RESPIRATION RATE: 15 BRPM | HEIGHT: 62 IN

## 2018-09-27 DIAGNOSIS — M53.3 SACROILIAC JOINT DYSFUNCTION: Primary | ICD-10-CM

## 2018-09-27 DIAGNOSIS — G89.29 CHRONIC PAIN: ICD-10-CM

## 2018-09-27 PROCEDURE — 25000003 PHARM REV CODE 250: Performed by: PAIN MEDICINE

## 2018-09-27 PROCEDURE — 25500020 PHARM REV CODE 255: Performed by: PAIN MEDICINE

## 2018-09-27 PROCEDURE — 27096 INJECT SACROILIAC JOINT: CPT | Mod: 50,,, | Performed by: PAIN MEDICINE

## 2018-09-27 PROCEDURE — 27096 INJECT SACROILIAC JOINT: CPT | Mod: 50 | Performed by: PAIN MEDICINE

## 2018-09-27 PROCEDURE — 63600175 PHARM REV CODE 636 W HCPCS: Performed by: PAIN MEDICINE

## 2018-09-27 RX ORDER — TRIAMCINOLONE ACETONIDE 40 MG/ML
INJECTION, SUSPENSION INTRA-ARTICULAR; INTRAMUSCULAR
Status: DISCONTINUED | OUTPATIENT
Start: 2018-09-27 | End: 2018-09-27 | Stop reason: HOSPADM

## 2018-09-27 RX ORDER — BUPIVACAINE HYDROCHLORIDE 2.5 MG/ML
INJECTION, SOLUTION EPIDURAL; INFILTRATION; INTRACAUDAL
Status: DISCONTINUED | OUTPATIENT
Start: 2018-09-27 | End: 2018-09-27 | Stop reason: HOSPADM

## 2018-09-27 RX ORDER — LIDOCAINE HYDROCHLORIDE 10 MG/ML
INJECTION, SOLUTION EPIDURAL; INFILTRATION; INTRACAUDAL; PERINEURAL
Status: DISCONTINUED | OUTPATIENT
Start: 2018-09-27 | End: 2018-09-27 | Stop reason: HOSPADM

## 2018-09-27 RX ORDER — ALPRAZOLAM 0.5 MG/1
0.5 TABLET, ORALLY DISINTEGRATING ORAL ONCE AS NEEDED
Status: DISCONTINUED | OUTPATIENT
Start: 2018-09-27 | End: 2018-09-27 | Stop reason: HOSPADM

## 2018-09-27 NOTE — DISCHARGE SUMMARY
[facility]  Discharge Note  Short Stay     Admit Date: 9/27/2018    Discharge Date: 9/27/2018     Attending Physician: Michelle Pineda Jr, MD    Diagnoses:  Active Hospital Problems    Diagnosis  POA    *Sacroiliac joint dysfunction [M53.3]  Yes    Chronic pain [G89.29]  Yes      Resolved Hospital Problems   No resolved problems to display.     Discharged Condition: Good     Hospital Course: Patient was admitted for an outpatient interventional pain management procedure and tolerated the procedure well with no complications.     Final Diagnoses: Same as principal problem.     Disposition: Home or Self Care     Follow up/Patient Instructions:    Follow-up Information     Go to Michelle Pineda Jr, MD.    Specialty:  Pain Medicine  Why:  Post-procedural Follow Up As Scheduled, Call to make an appointment if you do not have one  Contact information:  [address]  [address]  [place] [address]  [phone]                   Reconciled Medications:     Medication List      CONTINUE taking these medications    albuterol 90 mcg/actuation inhaler  Commonly known as:  PROVENTIL/VENTOLIN HFA  Inhale 2 puffs into the lungs every 6 (six) hours as needed for Wheezing.     alendronate 70 MG tablet  Commonly known as:  FOSAMAX  Take 1 tablet (70 mg total) by mouth every 7 days.     * amLODIPine 5 MG tablet  Commonly known as:  NORVASC  TK 1 T PO QD     * amLODIPine 5 MG tablet  Commonly known as:  NORVASC  TAKE 1 TABLET BY MOUTH EVERY DAY     * aspirin 81 MG EC tablet  Commonly known as:  ECOTRIN  Take 81 mg by mouth once daily.     * aspirin 325 MG tablet  Take 325 mg by mouth once daily.     atorvastatin 40 MG tablet  Commonly known as:  LIPITOR  Take 1 tablet (40 mg total) by mouth once daily.     benztropine 0.5 MG tablet  Commonly known as:  COGENTIN  Take 1 tablet (0.5 mg total) by mouth 2 (two) times daily.     * butalbital-acetaminophen-caffeine -40 mg -40 mg per tablet  Commonly known as:   FIORICET, ESGIC     * butalbital-acetaminophen-caff -40 mg Cap  TK ONE C PO Q 8 H PRF HA     multivitamin capsule  Take 1 capsule by mouth once daily.     rOPINIRole 3 MG tablet  Commonly known as:  REQUIP  Take 1 tablet 2 hours before bedtime     sumatriptan 50 MG tablet  Commonly known as:  IMITREX  TK 1 T PO ONCE PRF MIGRAINE HA     thiothixene 10 MG capsule  Commonly known as:  NAVANE  Take 1 capsule (10 mg total) by mouth 2 (two) times daily.     tiZANidine 4 MG tablet  Commonly known as:  ZANAFLEX  1 tab po q 8 h prn leg cramps     topiramate 25 MG tablet  Commonly known as:  TOPAMAX  Take 75 mg by mouth 2 (two) times daily.     traMADol 50 mg tablet  Commonly known as:  ULTRAM  1-2 tabs po q 12 h prn pain     trihexyphenidyl 2 MG tablet  Commonly known as:  ARTANE  Take 1 tablet (2 mg total) by mouth 2 (two) times daily with meals.     venlafaxine 150 MG Cp24  Commonly known as:  EFFEXOR-XR  Take 1 capsule (150 mg total) by mouth once daily.         * This list has 6 medication(s) that are the same as other medications prescribed for you. Read the directions carefully, and ask your doctor or other care provider to review them with you.               Discharge Procedure Orders (must include Diet, Follow-up, Activity)   Call MD for:  temperature >100.4     Call MD for:  severe uncontrolled pain     Call MD for:  redness, tenderness, or signs of infection (pain, swelling, redness, odor or green/yellow discharge around incision site)     Call MD for:  difficulty breathing or increased cough     Call MD for:  severe persistent headache     Call MD for:  worsening rash     Remove dressing in 24 hours       Michelle Pineda Jr, MD  Interventional Pain Medicine / Anesthesiology

## 2018-09-27 NOTE — PLAN OF CARE
Problem: Patient Care Overview  Goal: Plan of Care Review  Outcome: Ongoing (interventions implemented as appropriate)  Procedure complete, tolerated well. Discharge instructions given and explained to patient. Patient will call for follow up with pain clinic. No sedation given, patient will drive self home. Safety maintained

## 2018-09-27 NOTE — PROGRESS NOTES
Subjective:     Patient ID:  Bhavna Landry is a 63 y.o. female.    [facility]    Chief Complaint: Low back pain and whole spine cracking    HPI    Bhavna Landry is a 63 y.o. female who presents with the above CC. Low back pain has been present for 4 months and has been getting progressively worse.  Pain is constant rated 7/10 that is worse with standing and better with laying down. No leg pain or paresthesias.  She feels some cracking of the entire spine at time and some pain in the neck region.    She has been seeing Dr. Pineda and got a right SI joint injection which completely relieved her back pain.  PT made it worse.  She is taking tramadol and zanaflex which is helping some.    No spine surgery.     Patient denies any recent accidents or trauma, no saddle anesthesias, and no bowel or bladder incontinence.      Review of Systems:  Please refer to page three of the spine center intake form for a complete review of systems.    Past Medical History:   Diagnosis Date    Allergy     Amblyopia     Anemia     Arthritis 1992    Cataract     Depression     Dry eyes     Dry mouth     Fibromyalgia 2014    Fibromyalgia     GERD (gastroesophageal reflux disease)     History of psychiatric hospitalization     Hyperlipidemia 1992    Hypertension     Kidney stone     Migraine headache     Osteoporosis     Psoriatic arthritis 1992    Right knee pain     post knee replacement surgery (possible rejectiion of metal)    RLS (restless legs syndrome)     Schizophrenia 1992    stable on meds    Urinary tract infection      Past Surgical History:   Procedure Laterality Date    ARTHROPLASTY, KNEE, TOTAL Right 2013    Performed by Philipp [facility]um [facility] at [facility] OR 2ND FLR    BLOCK, SACROILIAC JOINT-Right- ORAL SEDATION Right 2018    Performed by Michelle Pineda Jr., MD at [facility] PAIN MGT     SECTION      COLONOSCOPY N/A 2016    Procedure: COLONOSCOPY;   Surgeon: ANDRIA Connelly MD;  Location: [facility] ENDO (4TH FLR);  Service: Endoscopy;  Laterality: N/A;    COLONOSCOPY N/A 5/30/2016    Performed by ANDRIA Connelly MD at [facility] ENDO (4TH FLR)    COLONOSCOPY N/A 8/16/2013    Performed by Didier Poole MD at [facility] ENDO (4TH FLR)    cyst removed from right sinus  07/09/1982    GRAFT-FEMORAL AUTOLOGOUS BONE GRAFTING Right 8/28/2014    Performed by Stone Gonzalez MD at [facility] OR    HYSTERECTOMY      VAGINAL HYSTERECTOMY WITHOUT BSO - ENDOMETRIOSIS    INJECTION OF ANESTHETIC AGENT INTO SACROILIAC JOINT Right 8/30/2018    Procedure: BLOCK, SACROILIAC JOINT-Right- ORAL SEDATION;  Surgeon: Michelle Pineda Jr., MD;  Location: [facility];  Service: Pain Management;  Laterality: Right;    JOINT REPLACEMENT Right     knee    KNEE SURGERY      REVISION-ARTHROPLASTY-KNEE-TOTAL Right 8/28/2014    Performed by Stone Gonzalez MD at [facility] OR    Surgery on right knee  07/09/1982    tumor removed from back left side upper shoulder  03/17/2006     Current Outpatient Medications on File Prior to Visit   Medication Sig Dispense Refill    alendronate (FOSAMAX) 70 MG tablet Take 1 tablet (70 mg total) by mouth every 7 days. 4 tablet 11    amLODIPine (NORVASC) 5 MG tablet TAKE 1 TABLET BY MOUTH EVERY DAY 30 tablet 11    aspirin 325 MG tablet Take 325 mg by mouth once daily.      atorvastatin (LIPITOR) 40 MG tablet Take 1 tablet (40 mg total) by mouth once daily. 30 tablet 11    benztropine (COGENTIN) 0.5 MG tablet Take 1 tablet (0.5 mg total) by mouth 2 (two) times daily. 60 tablet 11    butalbital-acetaminophen-caff -40 mg Cap TK ONE C PO Q 8 H PRF HA  3    multivitamin capsule Take 1 capsule by mouth once daily.      ropinirole (REQUIP) 3 MG tablet Take 1 tablet 2 hours before bedtime 30 tablet 11    tiZANidine (ZANAFLEX) 4 MG tablet 1 tab po q 8 h prn leg cramps 276 tablet 0    topiramate (TOPAMAX) 25 MG tablet Take 75 mg by mouth 2 (two) times daily.       traMADol (ULTRAM) 50 mg tablet 1-2 tabs po q 12 h prn pain 80 tablet 0    trihexyphenidyl (ARTANE) 2 MG tablet Take 1 tablet (2 mg total) by mouth 2 (two) times daily with meals. 60 tablet 3    venlafaxine (EFFEXOR-XR) 150 MG Cp24 Take 1 capsule (150 mg total) by mouth once daily. 30 capsule 5    albuterol 90 mcg/actuation inhaler Inhale 2 puffs into the lungs every 6 (six) hours as needed for Wheezing. 1 each 11    amLODIPine (NORVASC) 5 MG tablet TK 1 T PO QD  11    aspirin (ECOTRIN) 81 MG EC tablet Take 81 mg by mouth once daily.      butalbital-acetaminophen-caffeine -40 mg (FIORICET, ESGIC) -40 mg per tablet   1    sumatriptan (IMITREX) 50 MG tablet TK 1 T PO ONCE PRF MIGRAINE HA  5    thiothixene (NAVANE) 10 MG capsule Take 1 capsule (10 mg total) by mouth 2 (two) times daily. 60 capsule 5     No current facility-administered medications on file prior to visit.      Review of patient's allergies indicates:   Allergen Reactions    Adhesive      Other reaction(s): Rash    Chloromycetin [chloramphenicol sod succinate] Hives    Etanercept      Other reaction(s): recurrent infections    Nickel sutures [surgical stainless steel]      Allergic contact dermatitis     Social History     Socioeconomic History    Marital status:      Spouse name: Not on file    Number of children: 1    Years of education: Not on file    Highest education level: Not on file   Social Needs    Financial resource strain: Not on file    Food insecurity - worry: Not on file    Food insecurity - inability: Not on file    Transportation needs - medical: Not on file    Transportation needs - non-medical: Not on file   Occupational History    Occupation: retired asst  [employer] [address]     Employer: [employer] 2010   Tobacco Use    Smoking status: Former Smoker     Packs/day: 0.50     Years: 10.00     Pack years: 5.00     Types: Cigarettes     Last attempt to quit: 5/19/2012     Years since  "quittin.3    Smokeless tobacco: Former User    Tobacco comment: stopped smoking 2017.     Substance and Sexual Activity    Alcohol use: No    Drug use: No    Sexual activity: No     Birth control/protection: Post-menopausal   Other Topics Concern    Patient feels they ought to cut down on drinking/drug use Not Asked    Patient annoyed by others criticizing their drinking/drug use Not Asked    Patient has felt bad or guilty about drinking/drug use Not Asked    Patient has had a drink/used drugs as an eye opener in the AM Not Asked    Are you pregnant or think you may be? Not Asked    Breast-feeding Not Asked   Social History Narrative    Exercise:  Childcare.        Ett:  3 days a week     Family History   Problem Relation Age of Onset    Colon cancer Mother     Psoriasis Mother     Cancer Mother         colon    Depression Mother     Diabetes Brother     Arthritis Brother     Heart disease Brother         cad    Cancer Father         lymphoma    Stroke Sister     No Known Problems Daughter     Hypertension Neg Hx     Suicide Neg Hx     Amblyopia Neg Hx     Blindness Neg Hx     Cataracts Neg Hx     Glaucoma Neg Hx     Macular degeneration Neg Hx     Retinal detachment Neg Hx     Strabismus Neg Hx        Objective:      Vitals:    18 1051   BP: (!) 144/75   Pulse: 77   Weight: 45.8 kg (101 lb)   Height: 5' 2" (1.575 m)   PainSc:   7   PainLoc: Back         Physical Exam:    General:  Bhavna Landry is well-developed, well-nourished, appears stated age, in no acute distress, alert and oriented to person, place, and time.    Pulmonary/Chest:  Respiratory effort normal  Abdominal: Exhibits no distension  Psychiatric:  Normal mood and affect.  Behavior is normal.  Judgement and thought content normal    Musculoskeletal:    Patient arises from a sitting to standing position without difficulty.  Patient walks to the door without evidence of limp, pain, or abnormality of gait. Patient " is able to walk on heels and toes without difficulty.    Lumbar ROM:   Mild pain in lumbar flexion, extension, right lateral bending, and left lateral bending.    Lumbar Spine Inspection:  Normal with no surgical scars and no visible rashes.    Lumbar Spine Palpation:  No tenderness to low back palpation.    SI Joint Palpation:  Mderate tenderness to bilateral SI Joint palpation, right greater than left.    Straight Leg Raise:  Negative right and left SLR.    Neurological: Alert and oriented to person, place, and time    Muscle strength against resistance:     Right Left   Hip flexion  5 / 5 5 / 5   Hip extension 5 / 5 5 / 5   Hip abduction 5 / 5 5 / 5   Hip adduction  5 / 5 5 / 5   Knee extension  5 / 5 5 / 5   Knee flexion 5 / 5 5 / 5   Dorsiflexion  5 / 5 5 / 5   EHL  5 / 5 5 / 5   Plantar flexion  5 / 5 5 / 5   Inversion of the feet 5 / 5 5 / 5   Eversion of the feet  5 / 5 5 / 5     Reflexes:     Right Left   Patellar 2+ 2+   Achilles 2+ 2+     Clonus:  Negative bilaterally    On gross examination of the bilateral upper extremities, patient has full painfree ROM with no signs of clubbing, cyanosis, edema, or weakness.     XRAY Results:     FINDINGS:  There is a levoscoliosis of the thoracolumbar spine centered at approximately L1.  Lawson angle measures approximately 26°.  No significant pelvic tilt.  No acute fractures.  Degenerative changes of the proximal lumbar spine and cervical spine worse at C5-C6.  There is grade 1 anterolisthesis of C4 on C5.  Visualized chest and abdomen show no acute findings.      Impression       Levoscoliosis and degenerative change of the spine as above.      Electronically signed by: Kehinde Greer MD  Date: 09/26/2018  Time: 10:15           Assessment:          1. Sacroiliitis    2. Acute bilateral low back pain without sciatica    3. Spondylosis of lumbar region without myelopathy or radiculopathy    4. Other idiopathic scoliosis, thoracolumbar region            Plan:            Bilateral SI joint pain    -She is scheduled to have bilateral SI joint injections with Dr. Reyes tomorrow  -Continue Tramadol and Zanaflex PRN from other providers  -If SI joint injections don't end up helping then she could discuss lumbar procedures further with Dr. Reyes  -Do not recommend continuing with PT lumbar spine at this time  -FU spine surgery PRN      Follow-Up:  Follow-up if symptoms worsen or fail to improve. If there are any questions prior to this, the patient was instructed to contact the office.       MERLY Groves, PA-C  Neurosurgery  [facility]  [facility]

## 2018-09-27 NOTE — OP NOTE
Procedure Note    Pre-operative Diagnosis: Sacroiliac Joint Dysfunction  Post-operative Diagnosis: Sacroiliac Joint Dysfunction  Procedure Date: 09/27/2018      Procedure:  (1) BILATERAL Sacroiliac Joint Injection     (2) Intraoperative fluoroscopy      Indications: (1) To alleviate pain and suffering, (2) reduce functional impairment, and for (3) diagnostic purposes.    The patients history and physical exam were reviewed. The risks (local discomfort, infection, headache, temporary or permanent weakness and/or numbness of one or both legs, temporary or permanent paraplegia, heart attack and stroke), benefits and alternatives to the procedure were discussed, and all questions were answered to the patients satisfaction. The patient agreed to proceed, and written, informed consent was verified.    Procedure in Detail: Patient was brought back to procedure room and placed in a prone position and head resting comfortably in head ring. Prior to the initiation of the procedure, the patient's identity, the site, and the nature of the procedure were verified.     The skin was prepped with chloroprep and sterile drapes were applied. The Right SI joint was identified by fluoroscopy in an oblique plane. Skin and subcutaneous tissues overyling the target area was anesthetized with 1-2 mL of Lidocaine 1%.  A 22g 3 1/2 inch spinal needle was advanced under intermittent fluoroscopy until joint space was entered. There was no paresthesia with needle placement. Aspiration was negative for blood. Omnipaque 0.5 mL was injected confirming appropriate position and spread into the joint without intravascualr uptake. Next, 2 mL containing 40 mg kenalog and 1 mL of 0.25% Marcaine  was injected without difficulty or resistance.  The spinal needle was removed with lidocaine flush and bandaged placed over site of needle insertion.      The same procedure was repeated on the opposite side.    EBL: nil    Disposition: The patient tolerated  the procedure well, and there were no apparent complications. Vital signs remained stable throughout the procedure. The patient was taken to the recovery area where written discharge instructions for the procedure were given.     Follow-up: RTC as scheduled      Michelle Pineda Jr, MD  Interventional Pain Medicine / Anesthesiology

## 2018-10-01 ENCOUNTER — TELEPHONE (OUTPATIENT)
Dept: PAIN MEDICINE | Facility: HOSPITAL | Age: 63
End: 2018-10-01

## 2018-10-06 ENCOUNTER — HOSPITAL ENCOUNTER (OUTPATIENT)
Dept: RADIOLOGY | Facility: HOSPITAL | Age: 63
Discharge: HOME | End: 2018-10-06
Attending: INTERNAL MEDICINE
Payer: MEDICARE

## 2018-10-06 DIAGNOSIS — Z12.31 ENCOUNTER FOR SCREENING MAMMOGRAM FOR MALIGNANT NEOPLASM OF BREAST: ICD-10-CM

## 2018-10-06 PROCEDURE — 77063 BREAST TOMOSYNTHESIS BI: CPT | Mod: TC

## 2018-10-06 PROCEDURE — 77067 SCR MAMMO BI INCL CAD: CPT | Mod: 26,,, | Performed by: RADIOLOGY

## 2018-10-06 PROCEDURE — 77063 BREAST TOMOSYNTHESIS BI: CPT | Mod: 26,,, | Performed by: RADIOLOGY

## 2018-10-06 PROCEDURE — 77067 SCR MAMMO BI INCL CAD: CPT | Mod: TC

## 2018-10-19 ENCOUNTER — HOSPITAL ENCOUNTER (EMERGENCY)
Facility: HOSPITAL | Age: 63
Discharge: HOME | End: 2018-10-19
Attending: EMERGENCY MEDICINE
Payer: MEDICARE

## 2018-10-19 VITALS
RESPIRATION RATE: 18 BRPM | HEIGHT: 62 IN | SYSTOLIC BLOOD PRESSURE: 166 MMHG | OXYGEN SATURATION: 97 % | HEART RATE: 65 BPM | WEIGHT: 104 LBS | DIASTOLIC BLOOD PRESSURE: 81 MMHG | TEMPERATURE: 98 F | BODY MASS INDEX: 19.14 KG/M2

## 2018-10-19 DIAGNOSIS — T14.90XA TRAUMA: ICD-10-CM

## 2018-10-19 DIAGNOSIS — M25.511 ACUTE PAIN OF RIGHT SHOULDER: ICD-10-CM

## 2018-10-19 DIAGNOSIS — R51.9 ACUTE NONINTRACTABLE HEADACHE, UNSPECIFIED HEADACHE TYPE: Primary | ICD-10-CM
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
## 2018-10-19 DIAGNOSIS — M54.2 NECK PAIN: ICD-10-CM

## 2018-10-19 PROCEDURE — 99284 EMERGENCY DEPT VISIT MOD MDM: CPT

## 2018-10-19 PROCEDURE — 99283 EMERGENCY DEPT VISIT LOW MDM: CPT | Mod: ,,, | Performed by: EMERGENCY MEDICINE
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
## 2018-10-24 ENCOUNTER — OFFICE VISIT (OUTPATIENT)
Dept: INTERNAL MEDICINE | Facility: CLINIC | Age: 63
End: 2018-10-24
Payer: MEDICARE

## 2018-10-24 VITALS
DIASTOLIC BLOOD PRESSURE: 80 MMHG | HEART RATE: 75 BPM | BODY MASS INDEX: 18.2 KG/M2 | HEIGHT: 62 IN | OXYGEN SATURATION: 97 % | SYSTOLIC BLOOD PRESSURE: 138 MMHG | WEIGHT: 98.88 LBS

## 2018-10-24 DIAGNOSIS — R51.9 NONINTRACTABLE HEADACHE, UNSPECIFIED CHRONICITY PATTERN, UNSPECIFIED HEADACHE TYPE: ICD-10-CM
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
## 2018-10-24 DIAGNOSIS — R25.2 HAND CRAMPS: ICD-10-CM

## 2018-10-24 DIAGNOSIS — M25.812 SHOULDER IMPINGEMENT, LEFT: ICD-10-CM

## 2018-10-24 DIAGNOSIS — S16.1XXD STRAIN OF NECK MUSCLE, SUBSEQUENT ENCOUNTER: Primary | ICD-10-CM

## 2018-10-24 PROCEDURE — 99213 OFFICE O/P EST LOW 20 MIN: CPT | Mod: PBBFAC | Performed by: INTERNAL MEDICINE

## 2018-10-24 PROCEDURE — 3075F SYST BP GE 130 - 139MM HG: CPT | Mod: CPTII,,, | Performed by: INTERNAL MEDICINE

## 2018-10-24 PROCEDURE — 99214 OFFICE O/P EST MOD 30 MIN: CPT | Mod: S$PBB,,, | Performed by: INTERNAL MEDICINE

## 2018-10-24 PROCEDURE — 3008F BODY MASS INDEX DOCD: CPT | Mod: CPTII,,, | Performed by: INTERNAL MEDICINE

## 2018-10-24 PROCEDURE — 99999 PR PBB SHADOW E&M-EST. PATIENT-LVL III: CPT | Mod: PBBFAC,,, | Performed by: INTERNAL MEDICINE

## 2018-10-24 PROCEDURE — 1101F PT FALLS ASSESS-DOCD LE1/YR: CPT | Mod: CPTII,,, | Performed by: INTERNAL MEDICINE

## 2018-10-24 PROCEDURE — 3079F DIAST BP 80-89 MM HG: CPT | Mod: CPTII,,, | Performed by: INTERNAL MEDICINE
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
## 2018-11-02 ENCOUNTER — NURSE TRIAGE (OUTPATIENT)
Dept: ADMINISTRATIVE | Facility: CLINIC | Age: 63
End: 2018-11-02
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
## 2018-11-05 NOTE — TELEPHONE ENCOUNTER
Spoke with pt, she states she is feeling better and does not need appt at the time. Advised pt to call back if she would like appt. Pt verbalized understanding

## 2018-11-13 ENCOUNTER — INITIAL CONSULT (OUTPATIENT)
Dept: OPHTHALMOLOGY | Facility: CLINIC | Age: 63
End: 2018-11-13
Payer: MEDICARE

## 2018-11-13 ENCOUNTER — CLINICAL SUPPORT (OUTPATIENT)
Dept: OPHTHALMOLOGY | Facility: CLINIC | Age: 63
End: 2018-11-13
Payer: MEDICARE

## 2018-11-13 DIAGNOSIS — H33.311 RETINAL TEAR OF RIGHT EYE: ICD-10-CM

## 2018-11-13 DIAGNOSIS — H35.62 RETINAL HEMORRHAGE, LEFT: ICD-10-CM

## 2018-11-13 DIAGNOSIS — H50.811 DUANE'S SYNDROME OF RIGHT EYE: ICD-10-CM

## 2018-11-13 DIAGNOSIS — H02.409 PTOSIS OF EYELID, UNSPECIFIED LATERALITY: Primary | ICD-10-CM

## 2018-11-13 DIAGNOSIS — H43.813 POSTERIOR VITREOUS DETACHMENT, BOTH EYES: ICD-10-CM

## 2018-11-13 DIAGNOSIS — H57.811 BROW PTOSIS, RIGHT: Primary | ICD-10-CM

## 2018-11-13 PROCEDURE — 92012 INTRM OPH EXAM EST PATIENT: CPT | Mod: S$GLB,,, | Performed by: OPHTHALMOLOGY

## 2018-11-13 PROCEDURE — 92285 EXTERNAL OCULAR PHOTOGRAPHY: CPT | Mod: S$GLB,,, | Performed by: OPHTHALMOLOGY

## 2018-11-13 PROCEDURE — 99999 PR PBB SHADOW E&M-EST. PATIENT-LVL II: CPT | Mod: PBBFAC,,, | Performed by: OPHTHALMOLOGY

## 2018-11-13 PROCEDURE — 92082 INTERMEDIATE VISUAL FIELD XM: CPT | Mod: S$GLB,,, | Performed by: OPHTHALMOLOGY

## 2018-11-13 NOTE — PROGRESS NOTES
HPI     Ref. By Dr Skelton for ptosis evaluation.  Pt. States right eye has been drooping for years.getting worse blocking   vision. Problem reading and driving. Headaches from forcing lid open.  Retinal tear od s/p barrier laser with Dr Moy. Sees Dr Skelton yearly  PVD OD  NS OU  Duane's OD  No accidents or injuries  No previous eyelid surgery  Dry eyes no drops  Treated for migraines Dr Bianchi at [facility]    Last edited by Marisol Pickens on 11/13/2018  1:33 PM. (History)            Assessment /Plan     For exam results, see Encounter Report.    Brow ptosis, right  -     External/Slit Lamp Photography  -     Goldmann Perimetry - OU - Intermediate - Both Eyes    Retinal tear of right eye    Retinal hemorrhage, left    Duane's syndrome of right eye    Posterior vitreous detachment, both eyes      Pt. With significant improvement of right superior visual field with lids taped vs. Untaped.    The patient is a very pleasant 63-year-old female who complains of impairment of the right temporal visual field over the last 3-4 years.  She notices the heaviness from her right brow.     Patient has asymmetry between her right and left side of her body.  CT head reviewed from earlier this year confirming no orbital masses.    Plan for right direct temporal brow lift and minor procedure room.    Informed consent obtained after extensive risks/benefits/alternatives were discussed with the patient including but not limited to pain, bleeding, infection, ocular injury, loss of the eye, asymmetry, need for revision in future, scarring.  Alternatives such as waiting were discussed.  All questions were answered.      Hold ASA, NSAIDS, and fish oil 5 to 7 days prior to procedure.    Return for surgery     Retinal tear followed by Dr. Skelton

## 2018-11-14 ENCOUNTER — TELEPHONE (OUTPATIENT)
Dept: INTERNAL MEDICINE | Facility: CLINIC | Age: 63
End: 2018-11-14

## 2018-11-14 RX ORDER — TRAMADOL HYDROCHLORIDE 50 MG/1
TABLET ORAL
Qty: 80 TABLET | Refills: 0 | Status: ON HOLD | OUTPATIENT
Start: 2018-11-14 | End: 2018-12-18 | Stop reason: HOSPADM

## 2018-11-14 NOTE — TELEPHONE ENCOUNTER
----- Message from Peggy Padgett sent at 11/14/2018  4:10 PM CST -----  Contact: self/[phone]  Patient Is calling in regards her early request for Rx - tramadol. Patient is asking for a courtesy call today. Thank you

## 2018-11-14 NOTE — TELEPHONE ENCOUNTER
Called and spoke to pt and advised that we did receive her medication request and it was sent to Dr. Vargas. Advised prescription request can take 24-48hrs but that I would send a message.

## 2018-11-14 NOTE — TELEPHONE ENCOUNTER
----- Message from Monique Gonzalez sent at 11/14/2018 12:13 PM CST -----  Contact: Patient [phone]  Type: Rx    Name of medication(s): traMADol (ULTRAM) 50 mg tablet  Is this a refill? New rx?Refill    Who prescribed medication?Sadaf Vargas MD    [facility], [facility] [address] [place] [address] [address] [place] [facility] [facility] [address]    Comments:Pt states that she will be going to a chiropractor for her neck and shoulder. Please refill.      Thanks

## 2018-11-14 NOTE — PROGRESS NOTES
Reliable-------Poor--OD--Good--OS  Fixation-------Poor--OD--Good---OS  Coop---------Cathy--OU    Kirk Ptosis HVF & External Photos Done OU

## 2018-11-26 ENCOUNTER — TELEPHONE (OUTPATIENT)
Dept: INTERNAL MEDICINE | Facility: CLINIC | Age: 63
End: 2018-11-26

## 2018-11-26 NOTE — TELEPHONE ENCOUNTER
----- Message from Catracho Hatfield sent at 11/26/2018 12:47 PM CST -----  Contact: Self [phone]  Patient would like to get a referral.  Does the patient already have the specialty clinic appointment scheduled:  no  If yes, what date is the appointment scheduled:     Referral to what specialty:   Colon and rectal  Reason (be specific):    Did you confirm the insurance currently in Epic is correct (important for a referral):    Does the patient want the referral with a specific physician:  Yes  Is the specialist an [facility]:  [facility]  Comments:

## 2018-11-26 NOTE — TELEPHONE ENCOUNTER
Pt states she would like to get orders for a colonoscopy, pt had one 5 years ago, pt has had polyps every time she has had a colonoscopy.

## 2018-12-05 ENCOUNTER — OFFICE VISIT (OUTPATIENT)
Dept: PSYCHIATRY | Facility: CLINIC | Age: 63
End: 2018-12-05
Payer: MEDICARE

## 2018-12-05 VITALS
SYSTOLIC BLOOD PRESSURE: 162 MMHG | HEART RATE: 107 BPM | BODY MASS INDEX: 18.03 KG/M2 | WEIGHT: 98 LBS | DIASTOLIC BLOOD PRESSURE: 86 MMHG | HEIGHT: 62 IN

## 2018-12-05 DIAGNOSIS — F20.0 PARANOID SCHIZOPHRENIA: Primary | ICD-10-CM

## 2018-12-05 DIAGNOSIS — F33.42 RECURRENT MAJOR DEPRESSIVE DISORDER, IN FULL REMISSION: ICD-10-CM

## 2018-12-05 PROCEDURE — 3077F SYST BP >= 140 MM HG: CPT | Mod: CPTII,S$GLB,, | Performed by: INTERNAL MEDICINE

## 2018-12-05 PROCEDURE — 3008F BODY MASS INDEX DOCD: CPT | Mod: CPTII,S$GLB,, | Performed by: INTERNAL MEDICINE

## 2018-12-05 PROCEDURE — 99999 PR PBB SHADOW E&M-EST. PATIENT-LVL III: CPT | Mod: PBBFAC,,, | Performed by: INTERNAL MEDICINE

## 2018-12-05 PROCEDURE — 3079F DIAST BP 80-89 MM HG: CPT | Mod: CPTII,S$GLB,, | Performed by: INTERNAL MEDICINE

## 2018-12-05 PROCEDURE — 1101F PT FALLS ASSESS-DOCD LE1/YR: CPT | Mod: CPTII,S$GLB,, | Performed by: INTERNAL MEDICINE

## 2018-12-05 PROCEDURE — 99214 OFFICE O/P EST MOD 30 MIN: CPT | Mod: S$GLB,,, | Performed by: INTERNAL MEDICINE

## 2018-12-05 RX ORDER — ARIPIPRAZOLE 10 MG/1
10 TABLET ORAL DAILY
Qty: 30 TABLET | Refills: 3 | Status: SHIPPED | OUTPATIENT
Start: 2018-12-05 | End: 2018-12-07 | Stop reason: SDUPTHER

## 2018-12-05 RX ORDER — VENLAFAXINE HYDROCHLORIDE 150 MG/1
150 CAPSULE, EXTENDED RELEASE ORAL DAILY
Qty: 30 CAPSULE | Refills: 11 | Status: SHIPPED | OUTPATIENT
Start: 2018-12-05 | End: 2019-01-10 | Stop reason: SDUPTHER

## 2018-12-07 ENCOUNTER — PATIENT MESSAGE (OUTPATIENT)
Dept: PSYCHIATRY | Facility: CLINIC | Age: 63
End: 2018-12-07

## 2018-12-07 ENCOUNTER — TELEPHONE (OUTPATIENT)
Dept: INTERNAL MEDICINE | Facility: CLINIC | Age: 63
End: 2018-12-07

## 2018-12-07 DIAGNOSIS — F20.0 PARANOID SCHIZOPHRENIA: Primary | ICD-10-CM

## 2018-12-07 DIAGNOSIS — F20.9 SCHIZOPHRENIA IN REMISSION: ICD-10-CM

## 2018-12-07 RX ORDER — ARIPIPRAZOLE 20 MG/1
20 TABLET ORAL DAILY
Qty: 30 TABLET | Refills: 3 | Status: SHIPPED | OUTPATIENT
Start: 2018-12-07 | End: 2018-12-07

## 2018-12-07 RX ORDER — OLANZAPINE 5 MG/1
5 TABLET ORAL 2 TIMES DAILY
Qty: 60 TABLET | Refills: 3 | Status: SHIPPED | OUTPATIENT
Start: 2018-12-07 | End: 2018-12-10

## 2018-12-07 NOTE — TELEPHONE ENCOUNTER
Spoke with daughter.  Patient's paranoia worsening and mother becoming more agitated.  No safety concerns at this time.  Will stop Abilify and start Zyprexa 5mg BID.  Daughter aware that if her mother remains agitated after 1st dose, can give an additional dose after 1 hour.  If any safety issues arise, she agrees to call 911 or take her mother to the ER.

## 2018-12-10 ENCOUNTER — HOSPITAL ENCOUNTER (EMERGENCY)
Facility: HOSPITAL | Age: 63
Discharge: PSYCHIATRIC OR SUBSTANCE USE FACILITY | End: 2018-12-10
Attending: EMERGENCY MEDICINE
Payer: MEDICARE

## 2018-12-10 ENCOUNTER — PATIENT MESSAGE (OUTPATIENT)
Dept: PSYCHIATRY | Facility: CLINIC | Age: 63
End: 2018-12-10

## 2018-12-10 ENCOUNTER — TELEPHONE (OUTPATIENT)
Dept: INTERNAL MEDICINE | Facility: CLINIC | Age: 63
End: 2018-12-10

## 2018-12-10 ENCOUNTER — HOSPITAL ENCOUNTER (INPATIENT)
Facility: HOSPITAL | Age: 63
LOS: 8 days | Discharge: HOME | DRG: 885 | End: 2018-12-18
Attending: INTERNAL MEDICINE | Admitting: INTERNAL MEDICINE
Payer: COMMERCIAL

## 2018-12-10 VITALS
BODY MASS INDEX: 16.73 KG/M2 | HEART RATE: 92 BPM | OXYGEN SATURATION: 99 % | WEIGHT: 98 LBS | SYSTOLIC BLOOD PRESSURE: 207 MMHG | HEIGHT: 64 IN | TEMPERATURE: 98 F | DIASTOLIC BLOOD PRESSURE: 95 MMHG | RESPIRATION RATE: 18 BRPM

## 2018-12-10 DIAGNOSIS — F20.9 SCHIZOPHRENIA, CHRONIC CONDITION WITH ACUTE EXACERBATION: Chronic | ICD-10-CM

## 2018-12-10 DIAGNOSIS — Z91.89 AT RISK FOR PROLONGED QT INTERVAL SYNDROME: ICD-10-CM

## 2018-12-10 DIAGNOSIS — F20.0 PARANOID SCHIZOPHRENIA: ICD-10-CM

## 2018-12-10 DIAGNOSIS — E78.5 HYPERLIPIDEMIA, UNSPECIFIED HYPERLIPIDEMIA TYPE: ICD-10-CM

## 2018-12-10 DIAGNOSIS — F29 PSYCHOSIS, UNSPECIFIED PSYCHOSIS TYPE: ICD-10-CM

## 2018-12-10 DIAGNOSIS — I10 ESSENTIAL HYPERTENSION: Chronic | ICD-10-CM

## 2018-12-10 DIAGNOSIS — Z86.59 HX OF SCHIZOPHRENIA: Primary | ICD-10-CM

## 2018-12-10 DIAGNOSIS — F17.200 TOBACCO USE DISORDER: Chronic | ICD-10-CM

## 2018-12-10 DIAGNOSIS — G25.81 RESTLESS LEGS: ICD-10-CM

## 2018-12-10 DIAGNOSIS — M19.90 OSTEOARTHRITIS, UNSPECIFIED OSTEOARTHRITIS TYPE, UNSPECIFIED SITE: ICD-10-CM

## 2018-12-10 DIAGNOSIS — G25.9 EXTRAPYRAMIDAL SYNDROME: Primary | ICD-10-CM

## 2018-12-10 DIAGNOSIS — G43.009 MIGRAINE WITHOUT AURA AND WITHOUT STATUS MIGRAINOSUS, NOT INTRACTABLE: Chronic | ICD-10-CM

## 2018-12-10 LAB
ALBUMIN SERPL BCP-MCNC: 3.7 G/DL
ALP SERPL-CCNC: 82 U/L
ALT SERPL W/O P-5'-P-CCNC: 24 U/L
AMPHET+METHAMPHET UR QL: NEGATIVE
ANION GAP SERPL CALC-SCNC: 10 MMOL/L
APAP SERPL-MCNC: <3 UG/ML
AST SERPL-CCNC: 33 U/L
BARBITURATES UR QL SCN>200 NG/ML: NEGATIVE
BASOPHILS # BLD AUTO: 0.09 K/UL
BASOPHILS NFR BLD: 1 %
BENZODIAZ UR QL SCN>200 NG/ML: NEGATIVE
BILIRUB SERPL-MCNC: 0.2 MG/DL
BILIRUB UR QL STRIP: NEGATIVE
BUN SERPL-MCNC: 8 MG/DL
BZE UR QL SCN: NEGATIVE
CALCIUM SERPL-MCNC: 10.8 MG/DL
CANNABINOIDS UR QL SCN: NEGATIVE
CHLORIDE SERPL-SCNC: 98 MMOL/L
CLARITY UR REFRACT.AUTO: CLEAR
CO2 SERPL-SCNC: 25 MMOL/L
COLOR UR AUTO: ABNORMAL
CREAT SERPL-MCNC: 1 MG/DL
CREAT UR-MCNC: 17 MG/DL
DIFFERENTIAL METHOD: ABNORMAL
EOSINOPHIL # BLD AUTO: 0.5 K/UL
EOSINOPHIL NFR BLD: 4.8 %
ERYTHROCYTE [DISTWIDTH] IN BLOOD BY AUTOMATED COUNT: 13.4 %
EST. GFR  (AFRICAN AMERICAN): >60 ML/MIN/1.73 M^2
EST. GFR  (NON AFRICAN AMERICAN): 59.3 ML/MIN/1.73 M^2
ETHANOL SERPL-MCNC: <10 MG/DL
GLUCOSE SERPL-MCNC: 94 MG/DL
GLUCOSE UR QL STRIP: NEGATIVE
HCT VFR BLD AUTO: 42.2 %
HGB BLD-MCNC: 13.9 G/DL
HGB UR QL STRIP: NEGATIVE
IMM GRANULOCYTES # BLD AUTO: 0.01 K/UL
IMM GRANULOCYTES NFR BLD AUTO: 0.1 %
KETONES UR QL STRIP: NEGATIVE
LEUKOCYTE ESTERASE UR QL STRIP: NEGATIVE
LYMPHOCYTES # BLD AUTO: 3.4 K/UL
LYMPHOCYTES NFR BLD: 36.4 %
MCH RBC QN AUTO: 29.8 PG
MCHC RBC AUTO-ENTMCNC: 32.9 G/DL
MCV RBC AUTO: 90 FL
METHADONE UR QL SCN>300 NG/ML: NEGATIVE
MONOCYTES # BLD AUTO: 0.7 K/UL
MONOCYTES NFR BLD: 7.1 %
NEUTROPHILS # BLD AUTO: 4.8 K/UL
NEUTROPHILS NFR BLD: 50.6 %
NITRITE UR QL STRIP: NEGATIVE
NRBC BLD-RTO: 0 /100 WBC
OPIATES UR QL SCN: NEGATIVE
PCP UR QL SCN>25 NG/ML: NEGATIVE
PH UR STRIP: 8 [PH] (ref 5–8)
PLATELET # BLD AUTO: 297 K/UL
PMV BLD AUTO: 8.8 FL
POTASSIUM SERPL-SCNC: 4.1 MMOL/L
PROT SERPL-MCNC: 6.8 G/DL
PROT UR QL STRIP: NEGATIVE
RBC # BLD AUTO: 4.67 M/UL
SALICYLATES SERPL-MCNC: <5 MG/DL
SODIUM SERPL-SCNC: 133 MMOL/L
SP GR UR STRIP: 1 (ref 1–1.03)
TOXICOLOGY INFORMATION: NORMAL
TSH SERPL DL<=0.005 MIU/L-ACNC: 0.82 UIU/ML
URN SPEC COLLECT METH UR: ABNORMAL
WBC # BLD AUTO: 9.38 K/UL

## 2018-12-10 PROCEDURE — 80307 DRUG TEST PRSMV CHEM ANLYZR: CPT

## 2018-12-10 PROCEDURE — 12400001 HC PSYCH SEMI-PRIVATE ROOM

## 2018-12-10 PROCEDURE — 80329 ANALGESICS NON-OPIOID 1 OR 2: CPT

## 2018-12-10 PROCEDURE — 25000003 PHARM REV CODE 250: Performed by: STUDENT IN AN ORGANIZED HEALTH CARE EDUCATION/TRAINING PROGRAM

## 2018-12-10 PROCEDURE — 93005 ELECTROCARDIOGRAM TRACING: CPT

## 2018-12-10 PROCEDURE — 99285 EMERGENCY DEPT VISIT HI MDM: CPT

## 2018-12-10 PROCEDURE — 99285 EMERGENCY DEPT VISIT HI MDM: CPT | Mod: ,,, | Performed by: PHYSICIAN ASSISTANT

## 2018-12-10 PROCEDURE — 80320 DRUG SCREEN QUANTALCOHOLS: CPT

## 2018-12-10 PROCEDURE — 93010 ELECTROCARDIOGRAM REPORT: CPT | Mod: ,,, | Performed by: INTERNAL MEDICINE

## 2018-12-10 PROCEDURE — 80053 COMPREHEN METABOLIC PANEL: CPT

## 2018-12-10 PROCEDURE — 81003 URINALYSIS AUTO W/O SCOPE: CPT | Mod: 59

## 2018-12-10 PROCEDURE — 84443 ASSAY THYROID STIM HORMONE: CPT

## 2018-12-10 PROCEDURE — 85025 COMPLETE CBC W/AUTO DIFF WBC: CPT

## 2018-12-10 RX ORDER — BENZTROPINE MESYLATE 0.5 MG/1
0.5 TABLET ORAL 2 TIMES DAILY
Status: DISCONTINUED | OUTPATIENT
Start: 2018-12-10 | End: 2018-12-11

## 2018-12-10 RX ORDER — AMLODIPINE BESYLATE 2.5 MG/1
5 TABLET ORAL DAILY
Status: DISCONTINUED | OUTPATIENT
Start: 2018-12-11 | End: 2018-12-11

## 2018-12-10 RX ORDER — ASPIRIN 325 MG
325 TABLET ORAL DAILY
Status: DISCONTINUED | OUTPATIENT
Start: 2018-12-11 | End: 2018-12-18 | Stop reason: HOSPADM

## 2018-12-10 RX ORDER — ATORVASTATIN CALCIUM 20 MG/1
40 TABLET, FILM COATED ORAL DAILY
Status: DISCONTINUED | OUTPATIENT
Start: 2018-12-11 | End: 2018-12-18 | Stop reason: HOSPADM

## 2018-12-10 RX ORDER — HALOPERIDOL 5 MG/1
5 TABLET ORAL 2 TIMES DAILY
Status: DISCONTINUED | OUTPATIENT
Start: 2018-12-10 | End: 2018-12-11

## 2018-12-10 RX ORDER — HYDROXYZINE PAMOATE 25 MG/1
25 CAPSULE ORAL ONCE
Status: COMPLETED | OUTPATIENT
Start: 2018-12-10 | End: 2018-12-10

## 2018-12-10 RX ORDER — DIPHENHYDRAMINE HYDROCHLORIDE 50 MG/ML
50 INJECTION INTRAMUSCULAR; INTRAVENOUS EVERY 4 HOURS PRN
Status: DISCONTINUED | OUTPATIENT
Start: 2018-12-10 | End: 2018-12-18 | Stop reason: HOSPADM

## 2018-12-10 RX ORDER — HALOPERIDOL 5 MG/1
5 TABLET ORAL EVERY 4 HOURS PRN
Status: DISCONTINUED | OUTPATIENT
Start: 2018-12-10 | End: 2018-12-18 | Stop reason: HOSPADM

## 2018-12-10 RX ORDER — FOLIC ACID 1 MG/1
1 TABLET ORAL DAILY
Status: DISCONTINUED | OUTPATIENT
Start: 2018-12-11 | End: 2018-12-18 | Stop reason: HOSPADM

## 2018-12-10 RX ORDER — DIPHENHYDRAMINE HCL 50 MG
50 CAPSULE ORAL EVERY 4 HOURS PRN
Status: DISCONTINUED | OUTPATIENT
Start: 2018-12-10 | End: 2018-12-18 | Stop reason: HOSPADM

## 2018-12-10 RX ORDER — ROPINIROLE 1 MG/1
3 TABLET, FILM COATED ORAL NIGHTLY
Status: DISCONTINUED | OUTPATIENT
Start: 2018-12-10 | End: 2018-12-11

## 2018-12-10 RX ORDER — HALOPERIDOL 5 MG/1
5-10 TABLET ORAL 2 TIMES DAILY
Qty: 120 TABLET | Refills: 3 | Status: ON HOLD | OUTPATIENT
Start: 2018-12-10 | End: 2018-12-18 | Stop reason: HOSPADM

## 2018-12-10 RX ORDER — BENZTROPINE MESYLATE 0.5 MG/1
0.5 TABLET ORAL 2 TIMES DAILY
Qty: 60 TABLET | Refills: 3 | Status: ON HOLD | OUTPATIENT
Start: 2018-12-10 | End: 2018-12-18 | Stop reason: HOSPADM

## 2018-12-10 RX ORDER — HALOPERIDOL 5 MG/ML
5 INJECTION INTRAMUSCULAR EVERY 4 HOURS PRN
Status: DISCONTINUED | OUTPATIENT
Start: 2018-12-10 | End: 2018-12-18 | Stop reason: HOSPADM

## 2018-12-10 RX ORDER — LORAZEPAM 1 MG/1
2 TABLET ORAL EVERY 4 HOURS PRN
Status: DISCONTINUED | OUTPATIENT
Start: 2018-12-10 | End: 2018-12-18 | Stop reason: HOSPADM

## 2018-12-10 RX ADMIN — HALOPERIDOL 5 MG: 5 TABLET ORAL at 11:12

## 2018-12-10 RX ADMIN — BENZTROPINE MESYLATE 0.5 MG: 0.5 TABLET ORAL at 11:12

## 2018-12-10 RX ADMIN — HYDROXYZINE PAMOATE 25 MG: 25 CAPSULE ORAL at 11:12

## 2018-12-10 NOTE — ED TRIAGE NOTES
Pt ran out of her nevane 2 weeks ago for schizophrenia . Pt psychiatrist changed her med to abilify and symptoms worsened. Pt was recently changed to zyprexa for past 3 days with no improvement. Pt talking to herself and having hallucinations.

## 2018-12-10 NOTE — TELEPHONE ENCOUNTER
Spoke with daughter.  Reports patient remains paranoid and psychotic.  Responding to unseen others.  Not sleeping well.  No agitation or safety issues at this time.  Discussed recommendation for hospitalization since failing 2 antipsychotics in the last week.  Patient declining hospitalization and family does not want to [facility] through .  Will stop Zyprexa and start Haldol 5-10mg BID.  Daughter agrees to keep me updated.  She knows to call 911 or take patient to the ED if patient becomes agitated or other safety issues arise.  She also is aware she can call the  to hold patient involuntarily.

## 2018-12-10 NOTE — ED PROVIDER NOTES
Encounter Date: 12/10/2018       History     Chief Complaint   Patient presents with    Psychiatric Evaluation     Pt sent in by psychiatrist for evaluation. Pt had medication change and new meds are not working.  Pt denies suicidal ideations.      5:08 PM  Patient is a 63 year old female with a pmhx of schizophrenia, HTN, HLD, who presents to the ED for psychiatric evaluation. Patient recently d/c Navane which she has been on for over a decade because it was on back order. Her pscyhiatrist presribed her new medication, but patient and her family feels as if the medication is not working and she is declining. Over the weekend, patient has been going back in forth in the house, slamming doors, talking to herself outloud. She however denies any auditory or visual hallucinations, suicidal ideations, homicidal ideations. She has decrease sleep. She presents to the ED with her daughter who she stays with and is concerned. Patient has a chronic cough, unchanged from baseline. No fever or chills. Smokes 10 cigs per day.           Review of patient's allergies indicates:   Allergen Reactions    Adhesive      Other reaction(s): Rash    Chloromycetin [chloramphenicol sod succinate] Hives    Etanercept      Other reaction(s): recurrent infections    Nickel sutures [surgical stainless steel]      Allergic contact dermatitis     Past Medical History:   Diagnosis Date    Allergy     Amblyopia     Anemia     Arthritis 02/02/1992    Cataract     Depression     Dry eyes     Dry mouth     Fibromyalgia 4/17/2014    Fibromyalgia     GERD (gastroesophageal reflux disease)     History of psychiatric hospitalization     Hyperlipidemia 02/02/1992    Hypertension     Kidney stone     Migraine headache     Osteoporosis     Psoriatic arthritis 02/02/1992    Right knee pain     post knee replacement surgery (possible rejectiion of metal)    RLS (restless legs syndrome)     Schizophrenia 02/02/1992    stable on meds     Urinary tract infection      Past Surgical History:   Procedure Laterality Date    ARTHROPLASTY, KNEE, TOTAL Right 2013    Performed by Philipp Begum MD at [facility] OR 2ND FLR    BLOCK, SACROILIAC JOINT-BILATERAL Bilateral 2018    Performed by Michelle Pineda Jr., [facility] at [facility]    BLOCK, SACROILIAC JOINT-Right- ORAL SEDATION Right 2018    Performed by Michelle Pineda Jr., [facility] at [facility]     SECTION      COLONOSCOPY N/A 2016    Procedure: COLONOSCOPY;  Surgeon: ANDRIA Connelly MD;  Location: [facility] ENDO (4TH FLR);  Service: Endoscopy;  Laterality: N/A;    COLONOSCOPY N/A 2016    Performed by ANDRIA Connelly MD at [facility] ENDO (4TH FLR)    COLONOSCOPY N/A 2013    Performed by Didier Poole MD at [facility] ENDO (4TH FLR)    cyst removed from right sinus  1982    GRAFT-FEMORAL AUTOLOGOUS BONE GRAFTING Right 2014    Performed by Stone Gonzalez MD at [facility], [place] [place] [facility] OR    HYSTERECTOMY      VAGINAL HYSTERECTOMY WITHOUT BSO - ENDOMETRIOSIS    INJECTION OF ANESTHETIC AGENT INTO SACROILIAC JOINT Right 2018    Procedure: BLOCK, SACROILIAC JOINT-Right- ORAL SEDATION;  Surgeon: Michelle Pineda Jr., MD;  Location: [facility];  Service: Pain Management;  Laterality: Right;    INJECTION OF ANESTHETIC AGENT INTO SACROILIAC JOINT Bilateral 2018    Procedure: BLOCK, SACROILIAC JOINT-BILATERAL;  Surgeon: Michelle Pineda Jr., MD;  Location: [facility];  Service: Pain Management;  Laterality: Bilateral;  Please keep at 10:00 due to trasnsportation    JOINT REPLACEMENT Right     knee    KNEE SURGERY      REVISION-ARTHROPLASTY-KNEE-TOTAL Right 2014    Performed by Stone Gonzalez MD at [facility], [place] OR    Surgery on right knee  1982    tumor removed from back left side upper shoulder  2006     Family History   Problem Relation Age of Onset    Colon cancer Mother     Psoriasis Mother     Cancer Mother         colon    Depression  Mother     Diabetes Brother     Arthritis Brother     Heart disease Brother         cad    Cancer Father         lymphoma    Stroke Sister     No Known Problems Daughter     Hypertension Neg Hx     Suicide Neg Hx     Amblyopia Neg Hx     Blindness Neg Hx     Cataracts Neg Hx     Glaucoma Neg Hx     Macular degeneration Neg Hx     Retinal detachment Neg Hx     Strabismus Neg Hx      Social History     Tobacco Use    Smoking status: Former Smoker     Packs/day: 0.50     Years: 10.00     Pack years: 5.00     Types: Cigarettes     Last attempt to quit: 2012     Years since quittin.5    Smokeless tobacco: Former User    Tobacco comment: stopped smoking .     Substance Use Topics    Alcohol use: No    Drug use: No     Review of Systems   Constitutional: Negative for chills and fever.   HENT: Negative for ear pain and sore throat.    Eyes: Negative for pain.   Respiratory: Positive for cough (chronic, unchanged). Negative for shortness of breath.    Cardiovascular: Negative for chest pain.   Gastrointestinal: Negative for nausea.   Endocrine: Negative for polydipsia.   Genitourinary: Negative for dysuria and hematuria.   Musculoskeletal: Negative for back pain.   Skin: Negative for rash.   Neurological: Negative for weakness and headaches.   Hematological: Does not bruise/bleed easily.   Psychiatric/Behavioral: Positive for agitation, decreased concentration and sleep disturbance. Negative for hallucinations and suicidal ideas. The patient is hyperactive. The patient is not nervous/anxious.        Physical Exam     Initial Vitals [12/10/18 1635]   BP Pulse Resp Temp SpO2   (!) 189/85 96 16 98 °F (36.7 °C) 98 %      MAP       --         Physical Exam    Vitals reviewed.  Constitutional: She appears well-developed and well-nourished. She is not diaphoretic.   Pleasant elderly female in NAD and nontoxic appearing. Good clothing and hygiene.    HENT:   Head: Normocephalic and atraumatic.    Nose: Nose normal.   Eyes: Conjunctivae and EOM are normal.   Neck: Normal range of motion.   Cardiovascular: Normal rate, regular rhythm and normal heart sounds. Exam reveals no friction rub.    No murmur heard.  Pulmonary/Chest: No accessory muscle usage. No tachypnea. No respiratory distress. She has no wheezes. She has rhonchi in the right lower field. She has no rales.   Coughing throughout exam.    Abdominal: Soft. Bowel sounds are normal. She exhibits no distension. There is no tenderness. There is no rebound.   Musculoskeletal: Normal range of motion.   Neurological: She is alert and oriented to person, place, and time. She has normal strength. No sensory deficit.   Skin: Skin is warm and dry. No erythema. No pallor.   Psychiatric: She has a normal mood and affect. Her speech is normal and behavior is normal. Judgment and thought content normal. Her mood appears not anxious. She is not agitated, not slowed and not actively hallucinating. Cognition and memory are normal. She does not exhibit a depressed mood. She is attentive.         ED Course   Procedures  Labs Reviewed   CBC W/ AUTO DIFFERENTIAL - Abnormal; Notable for the following components:       Result Value    MPV 8.8 (*)     All other components within normal limits   COMPREHENSIVE METABOLIC PANEL - Abnormal; Notable for the following components:    Sodium 133 (*)     Calcium 10.8 (*)     eGFR if non  59.3 (*)     All other components within normal limits   URINALYSIS, REFLEX TO URINE CULTURE - Abnormal; Notable for the following components:    Specific Gravity, UA 1.000 (*)     All other components within normal limits    Narrative:     Preferred Collection Type->Urine, Clean Catch   ACETAMINOPHEN LEVEL - Abnormal; Notable for the following components:    Acetaminophen (Tylenol), Serum <3.0 (*)     All other components within normal limits   SALICYLATE LEVEL - Abnormal; Notable for the following components:    Salicylate Lvl <5.0  (*)     All other components within normal limits   TSH   DRUG SCREEN PANEL, URINE EMERGENCY    Narrative:     Preferred Collection Type->Urine, Clean Catch   ALCOHOL,MEDICAL (ETHANOL)          Imaging Results    None          Medical Decision Making:   History:   Old Medical Records: I decided to obtain old medical records.  Clinical Tests:   Lab Tests: Reviewed and Ordered       AP[facility] / Resident Notes:   5:21 PM  Psychiatry notified. They will evaluate.     6:33 PM  Psychiatry recommends PEC due to psychosis and gravely disabled. See their consult note. There is room in the [facility].     7:52 PM  Patient medically cleared. She will be admitted to the APU for further evaluation and treatment.       Marialuisa Garcia PA-C  Emergent Department  [facility]           Attending Attestation:     Physician Attestation Statement for NP/PA:   I discussed this assessment and plan of this patient with the NP/PA, but I did not personally examine the patient. The face to face encounter was performed by the NP/PA.                     Clinical Impression:   The primary encounter diagnosis was Hx of schizophrenia. Diagnoses of At risk for prolonged QT interval syndrome and Psychosis, unspecified psychosis type were also pertinent to this visit.      Disposition:   Disposition: Admitted                        Marialuisa Garcia PA-C  12/1955

## 2018-12-11 PROBLEM — F17.200 TOBACCO USE DISORDER: Chronic | Status: ACTIVE | Noted: 2018-12-11

## 2018-12-11 PROBLEM — F33.42 RECURRENT MAJOR DEPRESSIVE DISORDER, IN FULL REMISSION: Status: RESOLVED | Noted: 2018-08-02 | Resolved: 2018-12-11

## 2018-12-11 LAB
ALBUMIN SERPL BCP-MCNC: 3.6 G/DL
ALP SERPL-CCNC: 93 U/L
ALT SERPL W/O P-5'-P-CCNC: 25 U/L
ANION GAP SERPL CALC-SCNC: 13 MMOL/L
AST SERPL-CCNC: 36 U/L
BASOPHILS # BLD AUTO: 0.06 K/UL
BASOPHILS NFR BLD: 0.7 %
BILIRUB SERPL-MCNC: 0.4 MG/DL
BUN SERPL-MCNC: 7 MG/DL
CALCIUM SERPL-MCNC: 10.6 MG/DL
CHLORIDE SERPL-SCNC: 96 MMOL/L
CO2 SERPL-SCNC: 25 MMOL/L
CREAT SERPL-MCNC: 1 MG/DL
DIFFERENTIAL METHOD: ABNORMAL
EOSINOPHIL # BLD AUTO: 0.5 K/UL
EOSINOPHIL NFR BLD: 5.4 %
ERYTHROCYTE [DISTWIDTH] IN BLOOD BY AUTOMATED COUNT: 13.2 %
EST. GFR  (AFRICAN AMERICAN): >60 ML/MIN/1.73 M^2
EST. GFR  (NON AFRICAN AMERICAN): 59.3 ML/MIN/1.73 M^2
ESTIMATED AVG GLUCOSE: 105 MG/DL
FOLATE SERPL-MCNC: 15.3 NG/ML
GLUCOSE SERPL-MCNC: 113 MG/DL
HBA1C MFR BLD HPLC: 5.3 %
HCT VFR BLD AUTO: 44.8 %
HGB BLD-MCNC: 15.3 G/DL
IMM GRANULOCYTES # BLD AUTO: 0.02 K/UL
IMM GRANULOCYTES NFR BLD AUTO: 0.2 %
LYMPHOCYTES # BLD AUTO: 2.7 K/UL
LYMPHOCYTES NFR BLD: 30.5 %
MCH RBC QN AUTO: 29.9 PG
MCHC RBC AUTO-ENTMCNC: 34.2 G/DL
MCV RBC AUTO: 88 FL
MONOCYTES # BLD AUTO: 0.8 K/UL
MONOCYTES NFR BLD: 9 %
NEUTROPHILS # BLD AUTO: 4.7 K/UL
NEUTROPHILS NFR BLD: 54.2 %
NRBC BLD-RTO: 0 /100 WBC
PLATELET # BLD AUTO: 331 K/UL
PMV BLD AUTO: 8.5 FL
POTASSIUM SERPL-SCNC: 3.8 MMOL/L
PROT SERPL-MCNC: 6.9 G/DL
RBC # BLD AUTO: 5.11 M/UL
SODIUM SERPL-SCNC: 134 MMOL/L
T4 FREE SERPL-MCNC: 1.1 NG/DL
TSH SERPL DL<=0.005 MIU/L-ACNC: 2.75 UIU/ML
VIT B12 SERPL-MCNC: >2000 PG/ML
WBC # BLD AUTO: 8.69 K/UL

## 2018-12-11 PROCEDURE — 84439 ASSAY OF FREE THYROXINE: CPT

## 2018-12-11 PROCEDURE — 25000003 PHARM REV CODE 250: Performed by: STUDENT IN AN ORGANIZED HEALTH CARE EDUCATION/TRAINING PROGRAM

## 2018-12-11 PROCEDURE — 83036 HEMOGLOBIN GLYCOSYLATED A1C: CPT

## 2018-12-11 PROCEDURE — 84443 ASSAY THYROID STIM HORMONE: CPT

## 2018-12-11 PROCEDURE — 99223 1ST HOSP IP/OBS HIGH 75: CPT | Mod: ,,, | Performed by: PSYCHIATRY & NEUROLOGY

## 2018-12-11 PROCEDURE — 25000003 PHARM REV CODE 250: Performed by: PSYCHIATRY & NEUROLOGY

## 2018-12-11 PROCEDURE — 82607 VITAMIN B-12: CPT

## 2018-12-11 PROCEDURE — 85025 COMPLETE CBC W/AUTO DIFF WBC: CPT

## 2018-12-11 PROCEDURE — 82746 ASSAY OF FOLIC ACID SERUM: CPT

## 2018-12-11 PROCEDURE — 36415 COLL VENOUS BLD VENIPUNCTURE: CPT

## 2018-12-11 PROCEDURE — 90853 GROUP PSYCHOTHERAPY: CPT | Mod: ,,, | Performed by: PSYCHOLOGIST

## 2018-12-11 PROCEDURE — 12400001 HC PSYCH SEMI-PRIVATE ROOM

## 2018-12-11 PROCEDURE — 80053 COMPREHEN METABOLIC PANEL: CPT

## 2018-12-11 RX ORDER — ALBUTEROL SULFATE 90 UG/1
2 AEROSOL, METERED RESPIRATORY (INHALATION) EVERY 6 HOURS PRN
Status: DISCONTINUED | OUTPATIENT
Start: 2018-12-11 | End: 2018-12-18 | Stop reason: HOSPADM

## 2018-12-11 RX ORDER — AMLODIPINE BESYLATE 10 MG/1
10 TABLET ORAL DAILY
Status: DISCONTINUED | OUTPATIENT
Start: 2018-12-12 | End: 2018-12-18 | Stop reason: HOSPADM

## 2018-12-11 RX ORDER — ACETAMINOPHEN 325 MG/1
650 TABLET ORAL EVERY 6 HOURS PRN
Status: DISCONTINUED | OUTPATIENT
Start: 2018-12-11 | End: 2018-12-18 | Stop reason: HOSPADM

## 2018-12-11 RX ORDER — POLYETHYLENE GLYCOL 3350 17 G/17G
17 POWDER, FOR SOLUTION ORAL 2 TIMES DAILY PRN
Status: DISCONTINUED | OUTPATIENT
Start: 2018-12-11 | End: 2018-12-18 | Stop reason: HOSPADM

## 2018-12-11 RX ORDER — TIZANIDINE 4 MG/1
4 TABLET ORAL EVERY 8 HOURS PRN
Status: DISCONTINUED | OUTPATIENT
Start: 2018-12-11 | End: 2018-12-18 | Stop reason: HOSPADM

## 2018-12-11 RX ORDER — VENLAFAXINE HYDROCHLORIDE 150 MG/1
150 CAPSULE, EXTENDED RELEASE ORAL DAILY
Status: DISCONTINUED | OUTPATIENT
Start: 2018-12-11 | End: 2018-12-18 | Stop reason: HOSPADM

## 2018-12-11 RX ORDER — IBUPROFEN 200 MG
1 TABLET ORAL DAILY PRN
Status: DISCONTINUED | OUTPATIENT
Start: 2018-12-11 | End: 2018-12-18 | Stop reason: HOSPADM

## 2018-12-11 RX ORDER — AMLODIPINE BESYLATE 2.5 MG/1
5 TABLET ORAL ONCE
Status: COMPLETED | OUTPATIENT
Start: 2018-12-11 | End: 2018-12-11

## 2018-12-11 RX ORDER — LOPERAMIDE HYDROCHLORIDE 2 MG/1
2 CAPSULE ORAL 4 TIMES DAILY PRN
Status: DISCONTINUED | OUTPATIENT
Start: 2018-12-11 | End: 2018-12-18 | Stop reason: HOSPADM

## 2018-12-11 RX ORDER — RISPERIDONE 1 MG/1
2 TABLET ORAL NIGHTLY
Status: DISCONTINUED | OUTPATIENT
Start: 2018-12-11 | End: 2018-12-12

## 2018-12-11 RX ORDER — HYDROXYZINE PAMOATE 50 MG/1
50 CAPSULE ORAL EVERY 6 HOURS PRN
Status: DISCONTINUED | OUTPATIENT
Start: 2018-12-11 | End: 2018-12-13

## 2018-12-11 RX ADMIN — AMLODIPINE BESYLATE 5 MG: 2.5 TABLET ORAL at 09:12

## 2018-12-11 RX ADMIN — HALOPERIDOL 5 MG: 5 TABLET ORAL at 09:12

## 2018-12-11 RX ADMIN — ASPIRIN 325 MG ORAL TABLET 325 MG: 325 PILL ORAL at 09:12

## 2018-12-11 RX ADMIN — LORAZEPAM 2 MG: 1 TABLET ORAL at 02:12

## 2018-12-11 RX ADMIN — DIPHENHYDRAMINE HYDROCHLORIDE 50 MG: 50 CAPSULE ORAL at 02:12

## 2018-12-11 RX ADMIN — AMLODIPINE BESYLATE 5 MG: 2.5 TABLET ORAL at 01:12

## 2018-12-11 RX ADMIN — RISPERIDONE 2 MG: 1 TABLET ORAL at 08:12

## 2018-12-11 RX ADMIN — VENLAFAXINE HYDROCHLORIDE 150 MG: 150 CAPSULE, EXTENDED RELEASE ORAL at 01:12

## 2018-12-11 RX ADMIN — FOLIC ACID 1 MG: 1 TABLET ORAL at 09:12

## 2018-12-11 RX ADMIN — BENZTROPINE MESYLATE 0.5 MG: 0.5 TABLET ORAL at 09:12

## 2018-12-11 RX ADMIN — THERA TABS 1 TABLET: TAB at 09:12

## 2018-12-11 RX ADMIN — ATORVASTATIN CALCIUM 40 MG: 20 TABLET, FILM COATED ORAL at 09:12
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
## 2018-12-11 NOTE — H&P
[facility]  Psychiatry  History & Physical    Patient Name: Bhavna Landry  MRN: [id number]   Code Status: No Order  Admission Date: (Not on file)  Attending Physician: Carol Ann Poe MD   Primary Care Provider: Sadaf Vargas MD    Current Legal Status: [facility]    Patient information was obtained from patient, relative(s) and ER records.     Subjective:     Principal Problem:<principal problem not specified>    Chief Complaint:  AH     HPI: Bhavna Landry is a 63 y.o. female with a past psychiatric history of Paranoid Schizophrenia, currently presenting with AH.  Psychiatry was originally consulted to address the patient's symptoms of auditory hallucinations.     Per ED RN(s):  Pt ran out of her nevane 2 weeks ago for schizophrenia . Pt psychiatrist changed her med to abilify and symptoms worsened. Pt was recently changed to zyprexa for past 3 days with no improvement. Pt talking to herself and having hallucinations.      Per ED MD:  Patient is a 63 year old female with a pmhx of schizophrenia who presents to the ED for psychiatric evaluation. Patient recently d/c Navane which she has been on for over a decade because it was on back order. Her pscyhiatrist presribed her new medication, but patient and her family feels as if she is declining. Over the weekend, patient has been going back in forth in the house, slamming doors, talking to herself. She however denies any auditory or visual hallucinations, suicidal ideations, homicidal ideations. She presents to the ED with her daughter who she stays with  and who is concerned. Patient has a chronic cough, unchanged from baseline. No fever or chills. Smokes 10 cigs per day.     Per Psych MD:  Upon initiation of interview, pt was seated next to ED bed, dressed in casual clothing, in no apparent distress, daughter at bedside. Pt reports a long history of paranoid schizophrenia controlled on Navane, however, recently has been unable to fill said  "prescription, being told that said medication is no longer in production. As of late, pt has been "talking outloud," stating "don't do anything to my daughter, I dont want to hurt anybody, not my granddaughter, I wanna protect her from old people bc I dont want her to get gray hair." Additionally, pt states that she has been agitated due to feeling like "my head and body are being pulled apart." Pt follows with Dr. Poe, who has been utilizing different medications in an attempt to stabilize patient, on an outpt basis, having tried Abilify and Zyprexa, to no avail. Pt denies any other psychiatric symptomatology for depression (SIGECAPS), padmini (DIGFAST), suicidality or homicidality.     Psychiatric Review Of Systems - Is patient experiencing or having changes in:  sleep: no  appetite: no  weight: no  energy/anergy: no  interest/pleasure/anhedonia: no  somatic symptoms: no  guilty/hopelessness: no  concentration: no  S.I.B.s/risky behavior: no  SI/SA:  no     anxiety/panic: yes  Agoraphobia:  no  Social phobia:  no  Recurrent nightmares:  no  hyper startle response:  no  Avoidance: no  Recurrent thoughts:  no  Recurrent behaviors:  no     Irritability: yes  Racing thoughts: no  Impulsive behaviors: no  Pressured speech:  no     Paranoia:yes  Delusions: yes  AVH:yes     Medical Review Of Systems:  Pertinent items noted in HPI     Psychiatric History:  Diagnose(s): Yes - Paranoid Schizophrenia  Previous Medication Trials: Yes - NavSerenity malavelify, Zyprexa  Previous Psychiatric Hospitalizations: Yes - 4x (last 2000)  Previous Suicide Attempts: No  History of Violence: No  Outpatient Psychiatrist: Yes - Carol nAn Poe MD     Social History:  Marital Status:   Children: 1   Employment Status: retired  Education: high school diploma/GED  Special Ed: no   History: no  Housing Status: Yes - lives with daughter and grandchildren  Developmental History: No  History of Abuse: No  Access to Gun: Yes - but it is " unloaded     Substance Abuse History:  Recreational Drugs: no  Use of Alcohol: no  Rehab History: No  Tobacco Use: Yes - 10 per day  Use of Caffeine: Yes -  5 to 6 cups per day  Use of OTC: Yes - Benadryl  Legal consequences of chemical use: No  Is the patient aware of the biomedical complications associated with substance abuse and mental illness? No     Legal History:  Past Charges/Incarcerations: No  Pending Charges: No     Family Psychiatric History:   No     Collateral:   Yes - daughter at bedside, who brought pt to ED. She is very concerned, due to her mother being well controlled for years on Navane, and now with inability to access said medication, daughter worried for mother's safety as she has begun to hallucinate, regularly, as well as present agitated on multiple occasions      Psychosocial Stressors: health.   Functioning Relationships: good support system     Psychosocial Factors:  Maladaptive or problem behaviors: No  Peer group, social, ethic, cultural, emotional, and health factors: No  Living situation, family constellation, family circumstances/home: Yes - good support system  Recovery environment: Yes -   Community resources used by patient: No  Treatment acceptance/motivation for change: Yes -         Patient History           Medical as of 12/10/2018     Past Medical History     Diagnosis Date Comments Source    Allergy -- -- Provider    Amblyopia -- -- Provider    Anemia -- -- Provider    Arthritis 02/02/1992 -- Provider    Cataract -- -- Provider    Depression -- -- Provider    Dry eyes -- -- Provider    Dry mouth -- -- Provider    Fibromyalgia 4/17/2014 -- Provider    Fibromyalgia -- -- Provider    GERD (gastroesophageal reflux disease) -- -- Provider    History of psychiatric hospitalization -- -- Provider    Hyperlipidemia 02/02/1992 -- Provider    Hypertension -- -- Provider    Kidney stone -- -- Provider    Migraine headache -- -- Provider    Osteoporosis -- -- Provider    Psoriatic  arthritis 1992 -- Provider    Right knee pain -- post knee replacement surgery (possible rejectiion of metal) Provider    RLS (restless legs syndrome) -- -- Provider    Schizophrenia 1992 stable on meds Provider    Urinary tract infection -- -- Provider          Pertinent Negatives     Diagnosis Date Noted Comments Source    Alopecia 2013 -- Provider    Behavioral problem 2013 -- Provider    Chronic kidney disease 2013 -- Provider    Diabetes mellitus 2014 -- Provider    Diabetic retinopathy 2013 -- Provider    Liver disease 2014 -- Provider    Macular degeneration 2013 -- Provider    Corina 2013 -- Provider    Retinal detachment 2013 -- Provider    Scalp tenderness 2013 -- Provider    Self-harming behavior 2013 -- Provider    STD (sexually transmitted disease) 2014 -- Provider    Strabismus 2013 -- Provider    Suicide attempt 2013 -- Provider    Uveitis 2013 -- Provider    Vaginal infection 2014 -- Provider                  Surgical as of 12/10/2018     Past Surgical History     Procedure Laterality Date Comments Source    Surgery on right knee [Other] -- 1982 -- Provider    cyst removed from right sinus [Other] -- 1982 -- Provider    tumor removed from back left side upper shoulder [Other] -- 2006 -- Provider    HYSTERECTOMY -- -- VAGINAL HYSTERECTOMY WITHOUT BSO - ENDOMETRIOSIS Provider    KNEE SURGERY -- -- -- Provider     SECTION -- -- -- Provider    JOINT REPLACEMENT Right -- knee Provider    COLONOSCOPY N/A 2016 Procedure: COLONOSCOPY;  Surgeon: ANDRIA Connelly MD;  Location: [facility] ENDO ([address] [place] [address]);  Service: Endoscopy;  Laterality: N/A; Provider    INJECTION OF ANESTHETIC AGENT INTO SACROILIAC JOINT Right 2018 Procedure: BLOCK, SACROILIAC JOINT-Right- ORAL SEDATION;  Surgeon: Michelle Pineda Jr., MD;  Location: [facility] PAIN MGT;  Service: Pain Management;  Laterality:  Right; Provider    INJECTION OF ANESTHETIC AGENT INTO SACROILIAC JOINT Bilateral 9/27/2018 Procedure: BLOCK, SACROILIAC JOINT-BILATERAL;  Surgeon: Michelle Pineda Jr., MD;  Location: [facility];  Service: Pain Management;  Laterality: Bilateral;  Please keep at 10:00 due to trasnsportation Provider                  Family as of 12/10/2018     Problem Relation Name Age of Onset Comments Source    Colon cancer Mother -- -- -- Provider    Psoriasis Mother -- -- -- Provider    Cancer Mother -- -- colon Provider    Depression Mother -- -- -- Provider    Diabetes Brother -- -- -- Provider    Arthritis Brother -- -- -- Provider    Heart disease Brother -- -- cad Provider    Cancer Father -- -- lymphoma Provider    Stroke Sister -- -- -- Provider    No Known Problems Daughter -- -- -- Provider    Hypertension Neg Hx -- -- -- Provider    Suicide Neg Hx -- -- -- Provider    Amblyopia Neg Hx -- -- -- Provider    Blindness Neg Hx -- -- -- Provider    Cataracts Neg Hx -- -- -- Provider    Glaucoma Neg Hx -- -- -- Provider    Macular degeneration Neg Hx -- -- -- Provider    Retinal detachment Neg Hx -- -- -- Provider    Strabismus Neg Hx -- -- -- Provider            Tobacco Use as of 12/10/2018     Smoking Status Smoking Start Date Smoking Quit Date Packs/Day Years Used    Former Smoker -- 5/19/2012 0.50 10.00    Types Comments Smokeless Tobacco Status Smokeless Tobacco Quit Date Source     Cigarettes stopped smoking 2017.   Former User -- Provider            Alcohol Use as of 12/10/2018     Alcohol Use Drinks/Week Alcohol/Week Comments Source    No -- -- -- Provider    Frequency Standard Drinks Binge Drinking Source      -- -- -- Provider             Drug Use as of 12/10/2018     Drug Use Types Frequency Comments Source    No -- -- -- Provider            Sexual Activity as of 12/10/2018     Sexually Active Birth Control Partners Comments Source    No Post-menopausal -- -- Provider            Activities of Daily Living as of  12/10/2018     Activities of Daily Living Question Response Comments Source    Patient feels they ought to cut down on drinking/drug use Not Asked -- Provider    Patient annoyed by others criticizing their drinking/drug use Not Asked -- Provider    Patient has felt bad or guilty about drinking/drug use Not Asked -- Provider    Patient has had a drink/used drugs as an eye opener in the AM Not Asked -- Provider    Are you pregnant or think you may be? Not Asked -- Provider    Breast-feeding Not Asked -- Provider            Social Documentation as of 12/10/2018    Exercise:  Childcare.      Ett:  3 days a week  Source: Provider           Occupational as of 12/10/2018     Occupation Employer Comments Source    retired as[employer] DECEMBER 2010 -- Provider            Socioeconomic as of 12/10/2018     Marital Status Spouse Name Number of Children Years Education Education Level Preferred Language Ethnicity Race Source     -- 1 -- -- English /White White Provider    Financial Resource Strain Food Insecurity: Worry Food Insecurity: Inability Transportation Needs: Medical Transportation Needs: Non-medical       -- -- -- -- --             Pertinent History     Question Response Comments    Lives with -- --    Place in Birth Order -- --    Lives in -- --    Number of Siblings -- --    Raised by -- --    Legal Involvement -- --    Childhood Trauma -- --    Criminal History of -- --    Financial Status -- --    Highest Level of Education -- --    Does patient have access to a firearm? -- --     Service -- --    Primary Leisure Activity -- --    Spirituality -- --        Past Medical History:   Diagnosis Date    Allergy     Amblyopia     Anemia     Arthritis 02/02/1992    Cataract     Depression     Dry eyes     Dry mouth     Fibromyalgia 4/17/2014    Fibromyalgia     GERD (gastroesophageal reflux disease)     History of psychiatric hospitalization     Hyperlipidemia  1992    Hypertension     Kidney stone     Migraine headache     Osteoporosis     Psoriatic arthritis 1992    Right knee pain     post knee replacement surgery (possible rejectiion of metal)    RLS (restless legs syndrome)     Schizophrenia 1992    stable on meds    Urinary tract infection      Past Surgical History:   Procedure Laterality Date    ARTHROPLASTY, KNEE, TOTAL Right 2013    Performed by Philipp [facility]um [facility] at [facility] OR 2ND FLR    BLOCK, SACROILIAC JOINT-BILATERAL Bilateral 2018    Performed by Michelle Pineda Jr., [facility] at [facility] PAIN MGT    BLOCK, SACROILIAC JOINT-Right- ORAL SEDATION Right 2018    Performed by Michelle Pineda Jr., MD at [facility] PAIN T     SECTION      COLONOSCOPY N/A 2016    Procedure: COLONOSCOPY;  Surgeon: ANDRIA Connelly MD;  [facility] (4TH [facility]LR);  Service: Endoscopy;  Laterality: N/A;    COLONOSCOPY N/A 2016    Performed by ANDRIA Connelly MD at [facility] ENDO (4TH FLR)    COLONOSCOPY N/A 2013    Performed by Didier Poole MD at [facility] ENDO (4TH FLR)    cyst removed from right sinus  1982    GRAFT-FEMORAL AUTOLOGOUS BONE GRAFTING Right 2014    [facility]    HYSTERECTOMY      VAGINAL HYSTERECTOMY WITHOUT BSO - ENDOMETRIOSIS    INJECTION OF ANESTHETIC AGENT INTO SACROILIAC JOINT Right 2018    Procedure: BLOCK, SACROILIAC JOINT-Right- ORAL SEDATION;  Surgeon: Michelle Pineda Jr., MD;  Location[facility];  Service: Pain Management;  Laterality: Right;    INJECTION OF ANESTHETIC AGENT INTO SACROILIAC JOINT Bilateral 2018    Procedure: BLOCK, SACROILIAC JOINT-BILATERAL;  Surgeon: Michelle Pineda Jr., MD;  Location: [facility];  Service: Pain Management;  Laterality: Bilateral;  Please keep at 10:00 due to trasnsportation    JOINT REPLACEMENT Right     knee    KNEE SURGERY      REVISION-ARTHROPLASTY-KNEE-TOTAL Right 2014     [facility]    Surgery on right knee  1982    tumor removed from back left side upper shoulder  2006     Family History     Problem Relation (Age of Onset)    Arthritis Brother    Cancer Mother, Father    Colon cancer Mother    Depression Mother    Diabetes Brother    Heart disease Brother    No Known Problems Daughter    Psoriasis Mother    Stroke Sister        Tobacco Use    Smoking status: Former Smoker     Packs/day: 0.50     Years: 10.00     Pack years: 5.00     Types: Cigarettes     Last attempt to quit: 2012     Years since quittin.5    Smokeless tobacco: Former User    Tobacco comment: stopped smoking .     Substance and Sexual Activity    Alcohol use: No    Drug use: No    Sexual activity: No     Birth control/protection: Post-menopausal     Review of patient's allergies indicates:   Allergen Reactions    Adhesive      Other reaction(s): Rash    Chloromycetin [chloramphenicol sod succinate] Hives    Etanercept      Other reaction(s): recurrent infections    Nickel sutures [surgical stainless steel]      Allergic contact dermatitis       No current facility-administered medications on file prior to encounter.      Current Outpatient Medications on File Prior to Encounter   Medication Sig    albuterol 90 mcg/actuation inhaler Inhale 2 puffs into the lungs every 6 (six) hours as needed for Wheezing.    alendronate (FOSAMAX) 70 MG tablet Take 1 tablet (70 mg total) by mouth every 7 days.    amLODIPine (NORVASC) 5 MG tablet TK 1 T PO QD    amLODIPine (NORVASC) 5 MG tablet TAKE 1 TABLET BY MOUTH EVERY DAY    aspirin 325 MG tablet Take 325 mg by mouth once daily.    atorvastatin (LIPITOR) 40 MG tablet Take 1 tablet (40 mg total) by mouth once daily.    benztropine (COGENTIN) 0.5 MG tablet Take 1 tablet (0.5 mg total) by mouth 2 (two) times daily.    butalbital-acetaminophen-caffeine -40 mg (FIORICET, ESGIC) -40 mg per tablet      haloperidol (HALDOL) 5 MG tablet Take 1-2 tablets (5-10 mg total) by mouth 2 (two) times daily.    multivitamin capsule Take 1 capsule by mouth once daily.    ropinirole (REQUIP) 3 MG tablet Take 1 tablet 2 hours before bedtime    sumatriptan (IMITREX) 50 MG tablet TK 1 T PO ONCE PRF MIGRAINE HA    tiZANidine (ZANAFLEX) 4 MG tablet 1 tab po q 8 h prn leg cramps    topiramate (TOPAMAX) 25 MG tablet Take 75 mg by mouth 2 (two) times daily.    traMADol (ULTRAM) 50 mg tablet 1-2 tabs po q 12 h prn pain    venlafaxine (EFFEXOR-XR) 150 MG Cp24 Take 1 capsule (150 mg total) by mouth once daily.    [DISCONTINUED] benztropine (COGENTIN) 0.5 MG tablet Take 1 tablet (0.5 mg total) by mouth 2 (two) times daily.    [DISCONTINUED] OLANZapine (ZYPREXA) 5 MG tablet Take 1 tablet (5 mg total) by mouth 2 (two) times daily.     Psychotherapeutics (From admission, onward)    None        Review of Systems   Constitutional: Negative.    HENT: Negative.    Eyes: Negative.    Respiratory: Negative.    Cardiovascular: Negative.    Gastrointestinal: Negative.    Endocrine: Negative.    Genitourinary: Negative.    Musculoskeletal: Negative.    Neurological: Negative.    Hematological: Negative.      Strengths and Liabilities: Strength: Patient has positive support network.    Objective:     Vital Signs (Most Recent):    Vital Signs (24h Range):  Temp:  [98 °F (36.7 °C)] 98 °F (36.7 °C)  Pulse:  [88-96] 88  Resp:  [16-18] 18  SpO2:  [98 %-99 %] 99 %  BP: (146-189)/(84-85) 146/84           There is no height or weight on file to calculate BMI.    No intake or output data in the 24 hours ending 12/10/18 2024    Physical Exam   Constitutional: She is oriented to person, place, and time. She appears well-developed and well-nourished.   HENT:   Head: Normocephalic and atraumatic.   Eyes: Conjunctivae and EOM are normal. Pupils are equal, round, and reactive to light.   Neck: Normal range of motion. Neck supple.   Cardiovascular: Normal  "rate, regular rhythm, normal heart sounds and intact distal pulses.   Pulmonary/Chest: Effort normal and breath sounds normal.   Abdominal: Soft. Bowel sounds are normal.   Musculoskeletal: Normal range of motion.   Neurological: She is alert and oriented to person, place, and time. Gait normal.   Skin: Skin is warm and dry.   Psychiatric:   Mental Status Exam:  Appearance: unremarkable, older than stated age, casually dressed  Level of Consciousness: alert  Behavior/Cooperation: reluctant to participate  Psychomotor: within normal limits   Speech: loud  Language: english, fluid  Orientation: person, place, situation, day of week, month of year, year  Attention Span/Concentration: intact  Memory: Grossly intact  Mood: "agitated"  Affect: irritable  Thought Process: goal-directed  Associations: normal and logical, concrete  Thought Content: delusions: yes, hallucinations: (auditory: yes), paranoid  Fund of Knowledge: Aware of current events  Insight: fair  Judgment: fair      NEUROLOGICAL EXAMINATION:     MENTAL STATUS   Oriented to person, place, and time.     CRANIAL NERVES     CN III, IV, VI   Pupils are equal, round, and reactive to light.  Extraocular motions are normal.     CN V   Facial sensation intact.     CN VII   Facial expression full, symmetric.     CN VIII   CN VIII normal.     CN IX, X   CN IX normal.   CN X normal.     CN XI   CN XI normal.     CN XII   CN XII normal.     GAIT AND COORDINATION     Gait  Gait: normal    Significant Labs: All pertinent labs within the past 24 hours have been reviewed.    Significant Imaging: I have reviewed all pertinent imaging results/findings within the past 24 hours.    Assessment/Plan:     Paranoid schizophrenia    ASSESSMENT      Bhavna Landry is a 63 y.o. female with a past psychiatric history of Paranoid Schizophrenia, currently presenting with AH.  Psychiatry was originally consulted to address the patient's symptoms of auditory hallucinations. Pt with long " history of medication controlled paranoid schizophrenia, now due to inability to access previous med regimen, pt now presenting with paranoia and auditory hallucinations, in need of revamped med regimen in order to stabilize current psychotic symptomatology.     IMPRESSION  Paranoid Schizophrenia     RECOMMENDATION(S)       1. Scheduled Medication(s):  Haldol 5mg PO BID for psychotic features  Cogentin 0.5mg PO BID for EPS  Amlodipine Besylate 5mg PO daily  Ropinirole 3mg PO qHS  Atorvastatin 40mg PO daily  Aspirin 325mg PO daily     2. PRN Medication(s):  -Haldol 5mg PO/IM q6hr PRN for non-redirectable agitation  -Ativan 2mg PO/IM q4hr PRN for non-redirectable agitation  -Ramelteon 8mg PO qHS for insomnia     3.  Monitor:  Please obtain daily EKG to monitor QTc     4. Legal Status/Precaution(s):  Cont [facility] as pt is gravely disabled due to a mental illness. Will admit to [facility], once medically cleared by ED.        Estimated Discharge Date:   Initial Discharge Plan: Home    Prognosis: Fair    Need for Continued Hospitalization:   Requires ongoing hospitalization for stabilization of medications.    Total Time: 70 with greater than 50% of time spent in counseling and/or coordination of care.     In cases of emergency, daily coverage provided by Acute/ER Psych MD, NP, or SW, with contact numbers located in [facility] On Call Schedule.    Braulio Uriarte MD, PATRICIA  [facility], PGY-2  [facility]  Pager Number [phone]

## 2018-12-11 NOTE — PROGRESS NOTES
"Group Psychotherapy (PhD/LCSW)    Site: [facility]    Clinical status of patient: Inpatient    Date: 12/11/2018    Group Focus: Life Skills    Length of service: 77300 - 35-40 minutes    Number of patients in attendance: 8    Referred by: Acute Psychiatry Unit Treatment Team    Target symptoms: Psychosis    Patient's response to treatment: Pt listened but did not actively participate     Progress toward goals: Progressing slowly    Interval History: Pt appeared alert in group. Pt appeared to listen but did not actively participate in a group discussion of the way that we can be trapped by holding on to unrealistic expectations of ourselves or others. Noted examples in which "letting go" of unrealistic expectations allows us to be more effective in adapting to difficult life situations.      Diagnosis: Schizophrenia     Plan: Continue treatment on [facility]        "

## 2018-12-11 NOTE — HPI
"History of Present Illness:   Bhavna Landry is a 63 y.o. female with a past psychiatric history of Paranoid Schizophrenia, currently presenting with .  Psychiatry was originally consulted to address the patient's symptoms of auditory hallucinations.    Per ED RN(s):  Pt ran out of her nevane 2 weeks ago for schizophrenia . Pt psychiatrist changed her med to abilify and symptoms worsened. Pt was recently changed to zyprexa for past 3 days with no improvement. Pt talking to herself and having hallucinations.     Per ED MD:  Patient is a 63 year old female with a pmhx of schizophrenia who presents to the ED for psychiatric evaluation. Patient recently d/c Navane which she has been on for over a decade because it was on back order. Her pscyhiatrist presribed her new medication, but patient and her family feels as if she is declining. Over the weekend, patient has been going back in forth in the house, slamming doors, talking to herself. She however denies any auditory or visual hallucinations, suicidal ideations, homicidal ideations. She presents to the ED with her daughter who she stays with  and who is concerned. Patient has a chronic cough, unchanged from baseline. No fever or chills. Smokes 10 cigs per day.    Per Psych MD:  Upon initiation of interview, pt was seated next to ED bed, dressed in casual clothing, in no apparent distress, daughter at bedside. Pt reports a long history of paranoid schizophrenia controlled on Navane, however, recently has been unable to fill said prescription, being told that said medication is no longer in production. As of late, pt has been "talking outloud," stating "don't do anything to my daughter, I dont want to hurt anybody, not my granddaughter, I wanna protect her from old people bc I dont want her to get gray hair." Additionally, pt states that she has been agitated due to feeling like "my head and body are being pulled apart." Pt follows with Dr. Poe, who has been " utilizing different medications in an attempt to stabilize patient, on an outpt basis, having tried Abilify and Zyprexa, to no avail. Pt denies any other psychiatric symptomatology for depression (SIGECAPS), padmini (DIGFAST), suicidality or homicidality.    Psychiatric Review Of Systems - Is patient experiencing or having changes in:  sleep: no  appetite: no  weight: no  energy/anergy: no  interest/pleasure/anhedonia: no  somatic symptoms: no  guilty/hopelessness: no  concentration: no  S.I.B.s/risky behavior: no  SI/SA:  no    anxiety/panic: yes  Agoraphobia:  no  Social phobia:  no  Recurrent nightmares:  no  hyper startle response:  no  Avoidance: no  Recurrent thoughts:  no  Recurrent behaviors:  no    Irritability: yes  Racing thoughts: no  Impulsive behaviors: no  Pressured speech:  no    Paranoia:yes  Delusions: yes  AVH:yes    Medical Review Of Systems:  Pertinent items noted in HPI    Psychiatric History:  Diagnose(s): Yes - Paranoid Schizophrenia  Previous Medication Trials: Yes - Navane, Abilify, Zyprexa  Previous Psychiatric Hospitalizations: Yes - 4x (last 2000)  Previous Suicide Attempts: No  History of Violence: No  Outpatient Psychiatrist: Yes - Carol Ann Poe MD    Social History:  Marital Status:   Children: 1   Employment Status: retired  Education: high school diploma/GED  Special Ed: no   History: no  Housing Status: Yes - lives with daughter and grandchildren  Developmental History: No  History of Abuse: No  Access to Gun: Yes - but it is unloaded    Substance Abuse History:  Recreational Drugs: no  Use of Alcohol: no  Rehab History: No  Tobacco Use: Yes - 10 per day  Use of Caffeine: Yes -  5 to 6 cups per day  Use of OTC: Yes - Benadryl  Legal consequences of chemical use: No  Is the patient aware of the biomedical complications associated with substance abuse and mental illness? No    Legal History:  Past Charges/Incarcerations: No  Pending Charges: No    Family Psychiatric  History:   No    Collateral:   Yes - daughter at bedside, who brought pt to ED. She is very concerned, due to her mother being well controlled for years on Navane, and now with inability to access said medication, daughter worried for mother's safety as she has begun to hallucinate, regularly, as well as present agitated on multiple occasions     Psychosocial Stressors: health.   Functioning Relationships: good support system    Psychosocial Factors:  Maladaptive or problem behaviors: No  Peer group, social, ethic, cultural, emotional, and health factors: No  Living situation, family constellation, family circumstances/home: Yes - good support system  Recovery environment: Yes -   Community resources used by patient: No  Treatment acceptance/motivation for change: Yes -

## 2018-12-11 NOTE — SUBJECTIVE & OBJECTIVE
Patient History           Medical as of 12/10/2018     Past Medical History     Diagnosis Date Comments Source    Allergy -- -- Provider    Amblyopia -- -- Provider    Anemia -- -- Provider    Arthritis 02/02/1992 -- Provider    Cataract -- -- Provider    Depression -- -- Provider    Dry eyes -- -- Provider    Dry mouth -- -- Provider    Fibromyalgia 4/17/2014 -- Provider    Fibromyalgia -- -- Provider    GERD (gastroesophageal reflux disease) -- -- Provider    History of psychiatric hospitalization -- -- Provider    Hyperlipidemia 02/02/1992 -- Provider    Hypertension -- -- Provider    Kidney stone -- -- Provider    Migraine headache -- -- Provider    Osteoporosis -- -- Provider    Psoriatic arthritis 02/02/1992 -- Provider    Right knee pain -- post knee replacement surgery (possible rejectiion of metal) Provider    RLS (restless legs syndrome) -- -- Provider    Schizophrenia 02/02/1992 stable on meds Provider    Urinary tract infection -- -- Provider          Pertinent Negatives     Diagnosis Date Noted Comments Source    Alopecia 01/21/2013 -- Provider    Behavioral problem 08/22/2013 -- Provider    Chronic kidney disease 01/21/2013 -- Provider    Diabetes mellitus 02/07/2014 -- Provider    Diabetic retinopathy 09/05/2013 -- Provider    Liver disease 02/07/2014 -- Provider    Macular degeneration 09/05/2013 -- Provider    Corina 08/22/2013 -- Provider    Retinal detachment 09/05/2013 -- Provider    Scalp tenderness 01/21/2013 -- Provider    Self-harming behavior 08/22/2013 -- Provider    STD (sexually transmitted disease) 02/07/2014 -- Provider    Strabismus 09/05/2013 -- Provider    Suicide attempt 08/22/2013 -- Provider    Uveitis 09/05/2013 -- Provider    Vaginal infection 02/07/2014 -- Provider                  Surgical as of 12/10/2018     Past Surgical History     Procedure Laterality Date Comments Source    Surgery on right knee [Other] -- 07/09/1982 -- Provider    cyst removed from right sinus  [Other] -- 1982 -- Provider    tumor removed from back left side upper shoulder [Other] -- 2006 -- Provider    HYSTERECTOMY -- -- VAGINAL HYSTERECTOMY WITHOUT BSO - ENDOMETRIOSIS Provider    KNEE SURGERY -- -- -- Provider     SECTION -- -- -- Provider    JOINT REPLACEMENT Right -- knee Provider    COLONOSCOPY N/A 2016 Procedure: COLONOSCOPY;  Surgeon: ANDRIA Connelly MD;  Location: [facility] ENDO ([address] [place] [address]);  Service: Endoscopy;  Laterality: N/A; Provider    INJECTION OF ANESTHETIC AGENT INTO SACROILIAC JOINT Right 2018 Procedure: BLOCK, SACROILIAC JOINT-Right- ORAL SEDATION;  Surgeon: Michelle Pineda Jr., MD;  Location: [facility] PAIN MGT;  Service: Pain Management;  Laterality: Right; Provider    INJECTION OF ANESTHETIC AGENT INTO SACROILIAC JOINT Bilateral 2018 Procedure: BLOCK, SACROILIAC JOINT-BILATERAL;  Surgeon: Michelle Pineda Jr., MD;  Location: [facility] PAIN MGT;  Service: Pain Management;  Laterality: Bilateral;  Please keep at 10:00 due to trasnsportation Provider                  Family as of 12/10/2018     Problem Relation Name Age of Onset Comments Source    Colon cancer Mother -- -- -- Provider    Psoriasis Mother -- -- -- Provider    Cancer Mother -- -- colon Provider    Depression Mother -- -- -- Provider    Diabetes Brother -- -- -- Provider    Arthritis Brother -- -- -- Provider    Heart disease Brother -- -- cad Provider    Cancer Father -- -- lymphoma Provider    Stroke Sister -- -- -- Provider    No Known Problems Daughter -- -- -- Provider    Hypertension Neg Hx -- -- -- Provider    Suicide Neg Hx -- -- -- Provider    Amblyopia Neg Hx -- -- -- Provider    Blindness Neg Hx -- -- -- Provider    Cataracts Neg Hx -- -- -- Provider    Glaucoma Neg Hx -- -- -- Provider    Macular degeneration Neg Hx -- -- -- Provider    Retinal detachment Neg Hx -- -- -- Provider    Strabismus Neg Hx -- -- -- Provider            Tobacco Use as of 12/10/2018     Smoking Status Smoking Start  Date Smoking Quit Date Packs/Day Years Used    Former Smoker -- 5/19/2012 0.50 10.00    Types Comments Smokeless Tobacco Status Smokeless Tobacco Quit Date Source     Cigarettes stopped smoking 2017.   Former User -- Provider            Alcohol Use as of 12/10/2018     Alcohol Use Drinks/Week Alcohol/Week Comments Source    No -- -- -- Provider    Frequency Standard Drinks Binge Drinking Source      -- -- -- Provider             Drug Use as of 12/10/2018     Drug Use Types Frequency Comments Source    No -- -- -- Provider            Sexual Activity as of 12/10/2018     Sexually Active Birth Control Partners Comments Source    No Post-menopausal -- -- Provider            Activities of Daily Living as of 12/10/2018     Activities of Daily Living Question Response Comments Source    Patient feels they ought to cut down on drinking/drug use Not Asked -- Provider    Patient annoyed by others criticizing their drinking/drug use Not Asked -- Provider    Patient has felt bad or guilty about drinking/drug use Not Asked -- Provider    Patient has had a drink/used drugs as an eye opener in the AM Not Asked -- Provider    Are you pregnant or think you may be? Not Asked -- Provider    Breast-feeding Not Asked -- Provider            Social Documentation as of 12/10/2018    Exercise:  Childcare.      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 -- Provider            Socioeconomic as of 12/10/2018     Marital Status Spouse Name Number of Children Years Education Education Level Preferred Language Ethnicity Race Source     -- 1 -- -- English /White White Provider    Financial Resource Strain Food Insecurity: Worry Food Insecurity: Inability Transportation Needs: Medical Transportation Needs: Non-medical       -- -- -- -- --             Pertinent History     Question Response Comments     Lives with -- --    Place in Birth Order -- --    Lives in -- --    Number of Siblings -- --    Raised by -- --    Legal Involvement -- --    Childhood Trauma -- --    Criminal History of -- --    Financial Status -- --    Highest Level of Education -- --    Does patient have access to a firearm? -- --     Service -- --    Primary Leisure Activity -- --    Spirituality -- --        Past Medical History:   Diagnosis Date    Allergy     Amblyopia     Anemia     Arthritis 1992    Cataract     Depression     Dry eyes     Dry mouth     Fibromyalgia 2014    Fibromyalgia     GERD (gastroesophageal reflux disease)     History of psychiatric hospitalization     Hyperlipidemia 1992    Hypertension     Kidney stone     Migraine headache     Osteoporosis     Psoriatic arthritis 1992    Right knee pain     post knee replacement surgery (possible rejectiion of metal)    RLS (restless legs syndrome)     Schizophrenia 1992    stable on meds    Urinary tract infection      Past Surgical History:   Procedure Laterality Date    ARTHROPLASTY, KNEE, TOTAL Right 2013    Performed by Philipp [facility]um [facility] at [facility] OR 2ND FLR    BLOCK, SACROILIAC JOINT-BILATERAL Bilateral 2018    [facility]    BLOCK, SACROILIAC JOINT-Right- ORAL SEDATION Right 2018    [facility]     SECTION      COLONOSCOPY N/A 2016    Procedure: COLONOSCOPY;  Surgeon: ANDRIA Connelly MD;  Location: [facility] (4TH FLR);  Service: Endoscopy;  Laterality: N/A;    COLONOSCOPY N/A 2016    Performed by ANDRIA Connelly MD at [facility] ENDO (4TH FLR)    COLONOSCOPY N/A 2013    Performed by Didier Poole MD at [facility] ENDO (4TH FLR)    cyst removed from right sinus  1982    GRAFT-FEMORAL AUTOLOGOUS BONE GRAFTING Right 2014    [facility]     HYSTERECTOMY      VAGINAL HYSTERECTOMY WITHOUT BSO - ENDOMETRIOSIS    INJECTION OF ANESTHETIC AGENT INTO SACROILIAC JOINT Right 2018    Procedure: BLOCK, SACROILIAC JOINT-Right- ORAL SEDATION;  Surgeon: Michelle Pineda Jr., MD;  Location: [facility] PAIN MGT;  Service: Pain Management;  Laterality: Right;    INJECTION OF ANESTHETIC AGENT INTO SACROILIAC JOINT Bilateral 2018    Procedure: BLOCK, SACROILIAC JOINT-BILATERAL;  Surgeon: Michelle Pineda Jr., MD;  Location: [facility] PAIN MGT;  Service: Pain Management;  Laterality: Bilateral;  Please keep at 10:00 due to trasnsportation    JOINT REPLACEMENT Right     knee    KNEE SURGERY      REVISION-ARTHROPLASTY-KNEE-TOTAL Right 2014    [facility]    Surgery on right knee  1982    tumor removed from back left side upper shoulder  2006     Family History     Problem Relation (Age of Onset)    Arthritis Brother    Cancer Mother, Father    Colon cancer Mother    Depression Mother    Diabetes Brother    Heart disease Brother    No Known Problems Daughter    Psoriasis Mother    Stroke Sister        Tobacco Use    Smoking status: Former Smoker     Packs/day: 0.50     Years: 10.00     Pack years: 5.00     Types: Cigarettes     Last attempt to quit: 2012     Years since quittin.5    Smokeless tobacco: Former User    Tobacco comment: stopped smoking .     Substance and Sexual Activity    Alcohol use: No    Drug use: No    Sexual activity: No     Birth control/protection: Post-menopausal     Review of patient's allergies indicates:   Allergen Reactions    Adhesive      Other reaction(s): Rash    Chloromycetin [chloramphenicol sod succinate] Hives    Etanercept      Other reaction(s): recurrent infections    Nickel sutures [surgical stainless steel]      Allergic contact dermatitis       No current facility-administered medications on file prior to encounter.      Current Outpatient Medications on  File Prior to Encounter   Medication Sig    albuterol 90 mcg/actuation inhaler Inhale 2 puffs into the lungs every 6 (six) hours as needed for Wheezing.    alendronate (FOSAMAX) 70 MG tablet Take 1 tablet (70 mg total) by mouth every 7 days.    amLODIPine (NORVASC) 5 MG tablet TK 1 T PO QD    amLODIPine (NORVASC) 5 MG tablet TAKE 1 TABLET BY MOUTH EVERY DAY    aspirin 325 MG tablet Take 325 mg by mouth once daily.    atorvastatin (LIPITOR) 40 MG tablet Take 1 tablet (40 mg total) by mouth once daily.    benztropine (COGENTIN) 0.5 MG tablet Take 1 tablet (0.5 mg total) by mouth 2 (two) times daily.    butalbital-acetaminophen-caffeine -40 mg (FIORICET, ESGIC) -40 mg per tablet     haloperidol (HALDOL) 5 MG tablet Take 1-2 tablets (5-10 mg total) by mouth 2 (two) times daily.    multivitamin capsule Take 1 capsule by mouth once daily.    ropinirole (REQUIP) 3 MG tablet Take 1 tablet 2 hours before bedtime    sumatriptan (IMITREX) 50 MG tablet TK 1 T PO ONCE PRF MIGRAINE HA    tiZANidine (ZANAFLEX) 4 MG tablet 1 tab po q 8 h prn leg cramps    topiramate (TOPAMAX) 25 MG tablet Take 75 mg by mouth 2 (two) times daily.    traMADol (ULTRAM) 50 mg tablet 1-2 tabs po q 12 h prn pain    venlafaxine (EFFEXOR-XR) 150 MG Cp24 Take 1 capsule (150 mg total) by mouth once daily.    [DISCONTINUED] benztropine (COGENTIN) 0.5 MG tablet Take 1 tablet (0.5 mg total) by mouth 2 (two) times daily.    [DISCONTINUED] OLANZapine (ZYPREXA) 5 MG tablet Take 1 tablet (5 mg total) by mouth 2 (two) times daily.     Psychotherapeutics (From admission, onward)    None        Review of Systems   Constitutional: Negative.    HENT: Negative.    Eyes: Negative.    Respiratory: Negative.    Cardiovascular: Negative.    Gastrointestinal: Negative.    Endocrine: Negative.    Genitourinary: Negative.    Musculoskeletal: Negative.    Neurological: Negative.    Hematological: Negative.      Strengths and Liabilities: Strength:  "Patient has positive support network.    Objective:     Vital Signs (Most Recent):    Vital Signs (24h Range):  Temp:  [98 °F (36.7 °C)] 98 °F (36.7 °C)  Pulse:  [88-96] 88  Resp:  [16-18] 18  SpO2:  [98 %-99 %] 99 %  BP: (146-189)/(84-85) 146/84           There is no height or weight on file to calculate BMI.    No intake or output data in the 24 hours ending 12/10/18 2024    Physical Exam   Constitutional: She is oriented to person, place, and time. She appears well-developed and well-nourished.   HENT:   Head: Normocephalic and atraumatic.   Eyes: Conjunctivae and EOM are normal. Pupils are equal, round, and reactive to light.   Neck: Normal range of motion. Neck supple.   Cardiovascular: Normal rate, regular rhythm, normal heart sounds and intact distal pulses.   Pulmonary/Chest: Effort normal and breath sounds normal.   Abdominal: Soft. Bowel sounds are normal.   Musculoskeletal: Normal range of motion.   Neurological: She is alert and oriented to person, place, and time. Gait normal.   Skin: Skin is warm and dry.   Psychiatric:   Mental Status Exam:  Appearance: unremarkable, older than stated age, casually dressed  Level of Consciousness: alert  Behavior/Cooperation: reluctant to participate  Psychomotor: within normal limits   Speech: loud  Language: english, fluid  Orientation: person, place, situation, day of week, month of year, year  Attention Span/Concentration: intact  Memory: Grossly intact  Mood: "agitated"  Affect: irritable  Thought Process: goal-directed  Associations: normal and logical, concrete  Thought Content: delusions: yes, hallucinations: (auditory: yes), paranoid  Fund of Knowledge: Aware of current events  Insight: fair  Judgment: fair      NEUROLOGICAL EXAMINATION:     MENTAL STATUS   Oriented to person, place, and time.     CRANIAL NERVES     CN III, IV, VI   Pupils are equal, round, and reactive to light.  Extraocular motions are normal.     CN V   Facial sensation intact.     CN VII "   Facial expression full, symmetric.     CN VIII   CN VIII normal.     CN IX, X   CN IX normal.   CN X normal.     CN XI   CN XI normal.     CN XII   CN XII normal.     GAIT AND COORDINATION     Gait  Gait: normal    Significant Labs: All pertinent labs within the past 24 hours have been reviewed.    Significant Imaging: I have reviewed all pertinent imaging results/findings within the past 24 hours.

## 2018-12-11 NOTE — ASSESSMENT & PLAN NOTE
Bhavna Landry is a 63 y.o. female with a past psychiatric history of Paranoid Schizophrenia, who presented with AH.  Psychiatry was originally consulted to address the patient's symptoms of auditory hallucinations. Pt with long history of medication controlled paranoid schizophrenia, now due to inability to access previous med regimen, pt now presenting with paranoia and auditory hallucinations, in need of revamped med regimen in order to stabilize current psychotic symptomatology.     - Continue Effexor 150mg daily  - Started on Haldol, but changed to Risperdal 2mg qHS for psychotic features. Risperdal increased to 3mg qHS on 12/12.       PRN Medication(s):  -Haldol 5mg PO/IM q6hr PRN for non-redirectable agitation  -Ativan 2mg PO/IM q4hr PRN for non-redirectable agitation  -Vistaril 50mg qHS for insomnia

## 2018-12-11 NOTE — ED NOTES
Patient transferred to [facility] with ER staff and security with 1 bag of belongings. Patient searched for contraband. 1 bag of belongings placed in  1 bin.  called and informed of transfer to [facility]. Spoke with Melisa. MVc maintained.

## 2018-12-11 NOTE — PLAN OF CARE
Pt quiet but visible in milieu this shift. Appears to be distracted at times but denies AH/VH at this time. Denies SI/HI. Medication compliant. Pt was CEC'ed by  this shift. Attended unit activities. NAD observed. Will cont to monitor.

## 2018-12-11 NOTE — NURSING
"Pt slept 3 -4 hours, she was admitted during the night. Pt anxious but redirectable when admitted. She became increasingly anxious over the night requiring po Ativan and Benadryl. Pt thoughts became delusional and disorganized. She kept repeating " I'm being pulled from my body." Pt did take the medicine and returned to her room to sleep. MVC in place will continue to monitor.   "

## 2018-12-11 NOTE — PROGRESS NOTES
12/11/18 0900 12/11/18 1000 12/11/18 1100   [facility] Group Therapy   Group Name Community Reintegration Education Education   Specific Interventions Current Events Relapse Prevention Guided Imagery/Relaxation   Participation Level --  Active;Appropriate Active;Appropriate   Participation Quality Sleeping Cooperative Cooperative   Insight/Motivation --  Good Good   Affect/Mood Display --  Appropriate Appropriate   Cognition --  Alert Alert       12/11/18 1200   [facility] Group Therapy   Group Name Therapeutic Recreation   Specific Interventions Skilled Activity Crafts   Participation Level Active;Appropriate   Participation Quality Cooperative   Insight/Motivation Good   Affect/Mood Display Appropriate   Cognition Alert

## 2018-12-11 NOTE — SUBJECTIVE & OBJECTIVE
Patient History           Medical as of 12/10/2018     Past Medical History     Diagnosis Date Comments Source    Allergy -- -- Provider    Amblyopia -- -- Provider    Anemia -- -- Provider    Arthritis 02/02/1992 -- Provider    Cataract -- -- Provider    Depression -- -- Provider    Dry eyes -- -- Provider    Dry mouth -- -- Provider    Fibromyalgia 4/17/2014 -- Provider    Fibromyalgia -- -- Provider    GERD (gastroesophageal reflux disease) -- -- Provider    History of psychiatric hospitalization -- -- Provider    Hyperlipidemia 02/02/1992 -- Provider    Hypertension -- -- Provider    Kidney stone -- -- Provider    Migraine headache -- -- Provider    Osteoporosis -- -- Provider    Psoriatic arthritis 02/02/1992 -- Provider    Right knee pain -- post knee replacement surgery (possible rejectiion of metal) Provider    RLS (restless legs syndrome) -- -- Provider    Schizophrenia 02/02/1992 stable on meds Provider    Urinary tract infection -- -- Provider          Pertinent Negatives     Diagnosis Date Noted Comments Source    Alopecia 01/21/2013 -- Provider    Behavioral problem 08/22/2013 -- Provider    Chronic kidney disease 01/21/2013 -- Provider    Diabetes mellitus 02/07/2014 -- Provider    Diabetic retinopathy 09/05/2013 -- Provider    Liver disease 02/07/2014 -- Provider    Macular degeneration 09/05/2013 -- Provider    Corina 08/22/2013 -- Provider    Retinal detachment 09/05/2013 -- Provider    Scalp tenderness 01/21/2013 -- Provider    Self-harming behavior 08/22/2013 -- Provider    STD (sexually transmitted disease) 02/07/2014 -- Provider    Strabismus 09/05/2013 -- Provider    Suicide attempt 08/22/2013 -- Provider    Uveitis 09/05/2013 -- Provider    Vaginal infection 02/07/2014 -- Provider                  Surgical as of 12/10/2018     Past Surgical History     Procedure Laterality Date Comments Source    Surgery on right knee [Other] -- 07/09/1982 -- Provider    cyst removed from right sinus  [Other] -- 1982 -- Provider    tumor removed from back left side upper shoulder [Other] -- 2006 -- Provider    HYSTERECTOMY -- -- VAGINAL HYSTERECTOMY WITHOUT BSO - ENDOMETRIOSIS Provider    KNEE SURGERY -- -- -- Provider     SECTION -- -- -- Provider    JOINT REPLACEMENT Right -- knee Provider    COLONOSCOPY N/A 2016 Procedure: COLONOSCOPY;  Surgeon: ANDRIA Connelly MD;  Location: [facility] ENDO ([address] [place] [address]);  Service: Endoscopy;  Laterality: N/A; Provider    INJECTION OF ANESTHETIC AGENT INTO SACROILIAC JOINT Right 2018 Procedure: BLOCK, SACROILIAC JOINT-Right- ORAL SEDATION;  Surgeon: Michelle Pineda Jr., MD;  Location: [facility] PAIN MGT;  Service: Pain Management;  Laterality: Right; Provider    INJECTION OF ANESTHETIC AGENT INTO SACROILIAC JOINT Bilateral 2018 Procedure: BLOCK, SACROILIAC JOINT-BILATERAL;  Surgeon: Michelle Pineda Jr., MD;  Location: [facility] PAIN MGT;  Service: Pain Management;  Laterality: Bilateral;  Please keep at 10:00 due to trasnsportation Provider                  Family as of 12/10/2018     Problem Relation Name Age of Onset Comments Source    Colon cancer Mother -- -- -- Provider    Psoriasis Mother -- -- -- Provider    Cancer Mother -- -- colon Provider    Depression Mother -- -- -- Provider    Diabetes Brother -- -- -- Provider    Arthritis Brother -- -- -- Provider    Heart disease Brother -- -- cad Provider    Cancer Father -- -- lymphoma Provider    Stroke Sister -- -- -- Provider    No Known Problems Daughter -- -- -- Provider    Hypertension Neg Hx -- -- -- Provider    Suicide Neg Hx -- -- -- Provider    Amblyopia Neg Hx -- -- -- Provider    Blindness Neg Hx -- -- -- Provider    Cataracts Neg Hx -- -- -- Provider    Glaucoma Neg Hx -- -- -- Provider    Macular degeneration Neg Hx -- -- -- Provider    Retinal detachment Neg Hx -- -- -- Provider    Strabismus Neg Hx -- -- -- Provider            Tobacco Use as of 12/10/2018     Smoking Status Smoking Start  Date Smoking Quit Date Packs/Day Years Used    Former Smoker -- 5/19/2012 0.50 10.00    Types Comments Smokeless Tobacco Status Smokeless Tobacco Quit Date Source     Cigarettes stopped smoking 2017.   Former User -- Provider            Alcohol Use as of 12/10/2018     Alcohol Use Drinks/Week Alcohol/Week Comments Source    No -- -- -- Provider    Frequency Standard Drinks Binge Drinking Source      -- -- -- Provider             Drug Use as of 12/10/2018     Drug Use Types Frequency Comments Source    No -- -- -- Provider            Sexual Activity as of 12/10/2018     Sexually Active Birth Control Partners Comments Source    No Post-menopausal -- -- Provider            Activities of Daily Living as of 12/10/2018     Activities of Daily Living Question Response Comments Source    Patient feels they ought to cut down on drinking/drug use Not Asked -- Provider    Patient annoyed by others criticizing their drinking/drug use Not Asked -- Provider    Patient has felt bad or guilty about drinking/drug use Not Asked -- Provider    Patient has had a drink/used drugs as an eye opener in the AM Not Asked -- Provider    Are you pregnant or think you may be? Not Asked -- Provider    Breast-feeding Not Asked -- Provider            Social Documentation as of 12/10/2018    Exercise:  Childcare.      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 -- Provider            Socioeconomic as of 12/10/2018     Marital Status Spouse Name Number of Children Years Education Education Level Preferred Language Ethnicity Race Source     -- 1 -- -- English /White White Provider    Financial Resource Strain Food Insecurity: Worry Food Insecurity: Inability Transportation Needs: Medical Transportation Needs: Non-medical       -- -- -- -- --             Pertinent History     Question Response Comments     Lives with -- --    Place in Birth Order -- --    Lives in -- --    Number of Siblings -- --    Raised by -- --    Legal Involvement -- --    Childhood Trauma -- --    Criminal History of -- --    Financial Status -- --    Highest Level of Education -- --    Does patient have access to a firearm? -- --     Service -- --    Primary Leisure Activity -- --    Spirituality -- --        Past Medical History:   Diagnosis Date    Allergy     Amblyopia     Anemia     Arthritis 1992    Cataract     Depression     Dry eyes     Dry mouth     Fibromyalgia 2014    Fibromyalgia     GERD (gastroesophageal reflux disease)     History of psychiatric hospitalization     Hyperlipidemia 1992    Hypertension     Kidney stone     Migraine headache     Osteoporosis     Psoriatic arthritis 1992    Right knee pain     post knee replacement surgery (possible rejectiion of metal)    RLS (restless legs syndrome)     Schizophrenia 1992    stable on meds    Urinary tract infection      Past Surgical History:   Procedure Laterality Date    ARTHROPLASTY, KNEE, TOTAL Right 2013    [facility] 2ND FLR    BLOCK, SACROILIAC JOINT-BILATERAL Bilateral 2018    [facility]    BLOCK, SACROILIAC JOINT-Right- ORAL SEDATION Right 2018    [facility]     SECTION      COLONOSCOPY N/A 2016    Procedure: COLONOSCOPY;  Surgeon: ANDRIA Connelly MD;  Location: [facility] (4TH FLR);  Service: Endoscopy;  Laterality: N/A;    COLONOSCOPY N/A 2016    Performed by ANDRIA Connelly MD at [facility] ENDO (4TH FLR)    COLONOSCOPY N/A 2013    Performed by Didier Poole MD at [facility] ENDO (4TH FLR)    cyst removed from right sinus  1982    GRAFT-FEMORAL AUTOLOGOUS BONE GRAFTING Right 2014    [facility]     HYSTERECTOMY      VAGINAL HYSTERECTOMY WITHOUT BSO - ENDOMETRIOSIS    INJECTION OF ANESTHETIC AGENT INTO SACROILIAC JOINT Right 2018    Procedure: BLOCK, SACROILIAC JOINT-Right- ORAL SEDATION;  Surgeon: Michelle Pineda Jr., MD;  Location: [facility] PAIN MGT;  Service: Pain Management;  Laterality: Right;    INJECTION OF ANESTHETIC AGENT INTO SACROILIAC JOINT Bilateral 2018    Procedure: BLOCK, SACROILIAC JOINT-BILATERAL;  Surgeon: Michelle Pineda Jr., MD;  Location: [facility] PAIN MGT;  Service: Pain Management;  Laterality: Bilateral;  Please keep at 10:00 due to trasnsportation    JOINT REPLACEMENT Right     knee    KNEE SURGERY      REVISION-ARTHROPLASTY-KNEE-TOTAL Right 2014    [facility]    Surgery on right knee  1982    tumor removed from back left side upper shoulder  2006     Family History     Problem Relation (Age of Onset)    Arthritis Brother    Cancer Mother, Father    Colon cancer Mother    Depression Mother    Diabetes Brother    Heart disease Brother    No Known Problems Daughter    Psoriasis Mother    Stroke Sister        Tobacco Use    Smoking status: Former Smoker     Packs/day: 0.50     Years: 10.00     Pack years: 5.00     Types: Cigarettes     Last attempt to quit: 2012     Years since quittin.5    Smokeless tobacco: Former User    Tobacco comment: stopped smoking .     Substance and Sexual Activity    Alcohol use: No    Drug use: No    Sexual activity: No     Birth control/protection: Post-menopausal     Review of patient's allergies indicates:   Allergen Reactions    Adhesive      Other reaction(s): Rash    Chloromycetin [chloramphenicol sod succinate] Hives    Etanercept      Other reaction(s): recurrent infections    Nickel sutures [surgical stainless steel]      Allergic contact dermatitis       No current facility-administered medications on file prior to encounter.      Current Outpatient Medications on  File Prior to Encounter   Medication Sig    alendronate (FOSAMAX) 70 MG tablet Take 1 tablet (70 mg total) by mouth every 7 days.    amLODIPine (NORVASC) 5 MG tablet TK 1 T PO QD    aspirin 325 MG tablet Take 325 mg by mouth once daily.    atorvastatin (LIPITOR) 40 MG tablet Take 1 tablet (40 mg total) by mouth once daily.    benztropine (COGENTIN) 0.5 MG tablet Take 1 tablet (0.5 mg total) by mouth 2 (two) times daily.    multivitamin capsule Take 1 capsule by mouth once daily.    sumatriptan (IMITREX) 50 MG tablet TK 1 T PO ONCE PRF MIGRAINE HA    venlafaxine (EFFEXOR-XR) 150 MG Cp24 Take 1 capsule (150 mg total) by mouth once daily.    albuterol 90 mcg/actuation inhaler Inhale 2 puffs into the lungs every 6 (six) hours as needed for Wheezing.    amLODIPine (NORVASC) 5 MG tablet TAKE 1 TABLET BY MOUTH EVERY DAY    butalbital-acetaminophen-caffeine -40 mg (FIORICET, ESGIC) -40 mg per tablet     haloperidol (HALDOL) 5 MG tablet Take 1-2 tablets (5-10 mg total) by mouth 2 (two) times daily.    ropinirole (REQUIP) 3 MG tablet Take 1 tablet 2 hours before bedtime    tiZANidine (ZANAFLEX) 4 MG tablet 1 tab po q 8 h prn leg cramps    topiramate (TOPAMAX) 25 MG tablet Take 75 mg by mouth 2 (two) times daily.    traMADol (ULTRAM) 50 mg tablet 1-2 tabs po q 12 h prn pain    [DISCONTINUED] benztropine (COGENTIN) 0.5 MG tablet Take 1 tablet (0.5 mg total) by mouth 2 (two) times daily.    [DISCONTINUED] OLANZapine (ZYPREXA) 5 MG tablet Take 1 tablet (5 mg total) by mouth 2 (two) times daily.     Psychotherapeutics (From admission, onward)    None        Review of Systems  Strengths and Liabilities: Strength: Patient has positive support network., Liability: Patient is defensive.    Objective:     Vital Signs (Most Recent):  Temp: 98 °F (36.7 °C) (12/10/18 1635)  Pulse: 96 (12/10/18 1635)  Resp: 16 (12/10/18 1635)  BP: (!) 189/85 (12/10/18 1635)  SpO2: 98 % (12/10/18 1635) Vital Signs (24h  "Range):  Temp:  [98 °F (36.7 °C)] 98 °F (36.7 °C)  Pulse:  [96] 96  Resp:  [16] 16  SpO2:  [98 %] 98 %  BP: (189)/(85) 189/85     Height: 5' 4" (162.6 cm)  Weight: 44.5 kg (98 lb)  Body mass index is 16.82 kg/m².    No intake or output data in the 24 hours ending 12/10/18 1933    Physical Exam   Psychiatric:   Mental Status Exam:  Appearance: unremarkable, older than stated age, casually dressed  Level of Consciousness: alert  Behavior/Cooperation: reluctant to participate  Psychomotor: within normal limits   Speech: loud  Language: english, fluid  Orientation: person, place, situation, day of week, month of year, year  Attention Span/Concentration: intact  Memory: Grossly intact  Mood: "agitated"  Affect: irritable  Thought Process: goal-directed  Associations: normal and logical, concrete  Thought Content: delusions: yes, hallucinations: (auditory: yes), paranoid  Fund of Knowledge: Aware of current events  Insight: fair  Judgment: fair        Significant Labs: All pertinent labs within the past 24 hours have been reviewed.    Significant Imaging: I have reviewed all pertinent imaging results/findings within the past 24 hours.  "

## 2018-12-11 NOTE — NURSING
"Pt has not slept she is out of her room at the nursing station stating " I need to use the phone, I am going to die" pt reassured of her safety but continues to state " I'm being pulled I'm gonning to die." Pt is becoming increasingly more anxious. Pt given prn Ativan and Benadryl po, she has received scheduled po Haldol at  11:34 pm. Pt did take the po medicine but continues to fear for her life. Pt instructed she can sit in the phillips with staff while she feels anxious she did appreciate this and is sitting by the nursing station. MVC in place will continue to monitor.    "

## 2018-12-11 NOTE — ASSESSMENT & PLAN NOTE
ASSESSMENT      Bhavna Landry is a 63 y.o. female with a past psychiatric history of Paranoid Schizophrenia, currently presenting with AH.  Psychiatry was originally consulted to address the patient's symptoms of auditory hallucinations. Pt with long history of medication controlled paranoid schizophrenia, now due to inability to access previous med regimen, pt now presenting with paranoia and auditory hallucinations, in need of revamped med regimen in order to stabilize current psychotic symptomatology.     IMPRESSION  Paranoid Schizophrenia     RECOMMENDATION(S)       1. Scheduled Medication(s):  Haldol 5mg PO BID for psychotic features  Cogentin 0.5mg PO BID for EPS  Amlodipine Besylate 5mg PO daily  Ropinirole 3mg PO qHS  Atorvastatin 40mg PO daily  Aspirin 325mg PO daily  Alendronate 70mg PO qweekly     2. PRN Medication(s):  -Haldol 5mg PO/IM q6hr PRN for non-redirectable agitation  -Ativan 2mg PO/IM q4hr PRN for non-redirectable agitation  -Ramelteon 8mg PO qHS for insomnia     3.  Monitor:  Please obtain daily EKG to monitor QTc     4. Legal Status/Precaution(s):  Cont PEC-CEC as pt is gravely disabled due to a mental illness. Will admit to APU, once medically cleared by ED.

## 2018-12-11 NOTE — PLAN OF CARE
Problem: Adult Behavioral Health Plan of Care  Goal: Plan of Care Review  Outcome: Ongoing (interventions implemented as appropriate)  Pt admitted to the unit she is anxious but cooperative. PEC called into the coroners office. Pt checked for contraband and wearing unit scrubs. Pt ID checked using two person identifier and ID band placed. Pt denies SI/HI/AVH's, she did request and take a shower. Med compliant and has a good appetite. Pt oriented to her room and introduced to staff, all legal documents signed MD aware of the admit. Safety plan developed, pt given time to ask questions and have them answered. Belongings checked in and secured. MVC in place will continue to monitor.

## 2018-12-11 NOTE — ASSESSMENT & PLAN NOTE
ASSESSMENT      Bhavna Landry is a 63 y.o. female with a past psychiatric history of Paranoid Schizophrenia, currently presenting with AH.  Psychiatry was originally consulted to address the patient's symptoms of auditory hallucinations. Pt with long history of medication controlled paranoid schizophrenia, now due to inability to access previous med regimen, pt now presenting with paranoia and auditory hallucinations, in need of revamped med regimen in order to stabilize current psychotic symptomatology.     IMPRESSION  Paranoid Schizophrenia     RECOMMENDATION(S)       1. Scheduled Medication(s):  Haldol 5mg PO BID for psychotic features  Cogentin 0.5mg PO BID for EPS  Amlodipine Besylate 5mg PO daily  Ropinirole 3mg PO qHS  Atorvastatin 40mg PO daily  Aspirin 325mg PO daily     2. PRN Medication(s):  -Haldol 5mg PO/IM q6hr PRN for non-redirectable agitation  -Ativan 2mg PO/IM q4hr PRN for non-redirectable agitation  -Ramelteon 8mg PO qHS for insomnia     3.  Monitor:  Please obtain daily EKG to monitor QTc     4. Legal Status/Precaution(s):  Cont PEC-CEC as pt is gravely disabled due to a mental illness. Will admit to APU, once medically cleared by ED.

## 2018-12-11 NOTE — ED NOTES
Pt resting comfortably and independently repositioned in stretcher with bed locked in lowest position for safety.     1:1 sitter at bedside   Family at bedside       NAD noted at this time.   Respirations even and unlabored and visible chest rise noted.    Patient offered bathroom assistance and denies need at this time.     Frequent monitoring

## 2018-12-11 NOTE — PROGRESS NOTES
Acute Psychiatric Unit  Psychosocial History and Assessment  Progress Note      Patient Name: Bhavna Landry YOB: 1955 SW: Mariann Mccurdy, RSW Date: 12/11/2018    Chief Complaint: Audio Hallucinations     Consent:     Did the patient consent for an interview with the ? Yes    Did the patient consent for the  to contact family/friend/caregiver?   Yes  Name: Lang, Relationship: Alannaer  and Contact: [phone]    Did the patient give consent for the  to inform family/friend/caregiver of his/her whereabouts or to discuss discharge planning? Yes    Source of Information: Face to face with patient    Is information obtained from interviews considered reliable?   yes    Reason for Admission:     Active Hospital Problems    Diagnosis  POA    Paranoid schizophrenia [F20.0]  Yes      Resolved Hospital Problems   No resolved problems to display.       History of Present Illness - (Patient Perception):   Patient states she is only here because her daughter,patient states her daughter he talking to herself    History of Present Illness - (Perception of Others): as per h&P:Pt ran out of her nevane 2 weeks ago for schizophrenia . Pt psychiatrist changed her med to abilify and symptoms worsened. Pt was recently changed to zyprexa for past 3 days with no improvement. Pt talking to herself and having hallucinations.     Present biopsychosocial functioning: patient was calm , and pleasant, patient answered all interview questions. Patient is the caregiver for her grandchildren during the day, while their parents are at work.    Past biopsychosocial functioning: patient stated she retired from the [employer]     Family and Marital/Relationship History:     Significant Other/Partner Relationships:       Family Relationships: Intact    Culture and Roman Catholic:     Roman Catholic: No Roman Catholic    How strong of a role does Restoration and spirituality play in patient's life? None    Episcopal or spiritual concerns regarding treatment: not applicable     History of Abuse:   History of Abuse: Denies      Outcome: none     Psychiatric and Medical History:     History of psychiatric illness or treatment: prior inpatient treatment    Medical history:   Past Medical History:   Diagnosis Date    Allergy     Amblyopia     Anemia     Arthritis 02/02/1992    Cataract     Depression     Dry eyes     Dry mouth     Fibromyalgia 4/17/2014    Fibromyalgia     GERD (gastroesophageal reflux disease)     History of psychiatric hospitalization     Hyperlipidemia 02/02/1992    Hypertension     Kidney stone     Migraine headache     Osteoporosis     Psoriatic arthritis 02/02/1992    Right knee pain     post knee replacement surgery (possible rejectiion of metal)    RLS (restless legs syndrome)     Schizophrenia 02/02/1992    stable on meds    Urinary tract infection        Substance Abuse History:     Alcohol - (Patient Perspective): patient denies   Social History     Substance and Sexual Activity   Alcohol Use No       Alcohol - (Collateral Perspective): unable to access at this time     Drugs - (Patient Perspective): patient denies  Social History     Substance and Sexual Activity   Drug Use No     Drugs - (Collateral Perspective): unable to access at this time     Additional Comments: none     Education:     Currently Enrolled? No  High School (9-12) or GED    Special Education? No    Interested in Completing Education/GED: No    Employment and Financial:     Currently employed? Unemployed Reason for unemployment: patient is disabled   Source of Income: custodial/pension    Able to afford basic needs (food, shelter, utilities)? Yes    Who manages finances/personal affairs? Patient       Service:     ? no    Combat Service? No     Community Resources:     Describe present use of community resources: none      Identify previously used community resources   None, according  to patient     Environmental:     Current living situation:Lives with family    Social Evaluation:     Patient Assets: Communicable skills    Patient Limitations: patient is non medicine compliant and was unable to explain why she did not  her prescription.     High risk psychosocial issues that may impact discharge patient is non medicine compliant     Recommendations:     Anticipated discharge plan:   outpatient follow up    High risk issues requiring early treatment planning and immediate intervention: patient is non medicine compliant      Community resources needed for discharge planning:  aftercare treatment sources    Anticipated social work role(s) in treatment and discharge planning: ensure patient has a follow up appointment with patient's primary psychiatrist.

## 2018-12-11 NOTE — ED NOTES
Patient to be discharged to -HERE  All belongings sent home with patients daughter  Security here for escort  Patient transported under 1:1 observation  PEC sent with

## 2018-12-11 NOTE — CONSULTS
[facility]  Psychiatry  Consult Note    Patient Name: Bhavna Landry  MRN: [id number]   Code Status: No Order  Admission Date: 12/10/2018  Hospital Length of Stay: 0 days  Attending Physician: Geo Hauser MD  Primary Care Provider: Sadaf Vargas MD    Current Legal Status: [facility]    Patient information was obtained from patient, relative(s) and ER records.   Consults  Subjective:     Principal Problem:<principal problem not specified>    Chief Complaint:  AH     HPI: History of Present Illness:   Bhavna Landry is a 63 y.o. female with a past psychiatric history of Paranoid Schizophrenia, currently presenting with AH.  Psychiatry was originally consulted to address the patient's symptoms of auditory hallucinations.    Per ED RN(s):  Pt ran out of her nevane 2 weeks ago for schizophrenia . Pt psychiatrist changed her med to abilify and symptoms worsened. Pt was recently changed to zyprexa for past 3 days with no improvement. Pt talking to herself and having hallucinations.     Per ED MD:  Patient is a 63 year old female with a pmhx of schizophrenia who presents to the ED for psychiatric evaluation. Patient recently d/c Navane which she has been on for over a decade because it was on back order. Her pscyhiatrist presribed her new medication, but patient and her family feels as if she is declining. Over the weekend, patient has been going back in forth in the house, slamming doors, talking to herself. She however denies any auditory or visual hallucinations, suicidal ideations, homicidal ideations. She presents to the ED with her daughter who she stays with  and who is concerned. Patient has a chronic cough, unchanged from baseline. No fever or chills. Smokes 10 cigs per day.    Per Psych MD:  Upon initiation of interview, pt was seated next to ED bed, dressed in casual clothing, in no apparent distress, daughter at bedside. Pt reports a long history of paranoid schizophrenia controlled on Navane,  "however, recently has been unable to fill said prescription, being told that said medication is no longer in production. As of late, pt has been "talking outloud," stating "don't do anything to my daughter, I dont want to hurt anybody, not my granddaughter, I wanna protect her from old people bc I dont want her to get gray hair." Additionally, pt states that she has been agitated due to feeling like "my head and body are being pulled apart." Pt follows with Dr. Poe, who has been utilizing different medications in an attempt to stabilize patient, on an outpt basis, having tried Abilify and Zyprexa, to no avail. Pt denies any other psychiatric symptomatology for depression (SIGECAPS), padmini (DIGFAST), suicidality or homicidality.    Psychiatric Review Of Systems - Is patient experiencing or having changes in:  sleep: no  appetite: no  weight: no  energy/anergy: no  interest/pleasure/anhedonia: no  somatic symptoms: no  guilty/hopelessness: no  concentration: no  S.I.B.s/risky behavior: no  SI/SA:  no    anxiety/panic: yes  Agoraphobia:  no  Social phobia:  no  Recurrent nightmares:  no  hyper startle response:  no  Avoidance: no  Recurrent thoughts:  no  Recurrent behaviors:  no    Irritability: yes  Racing thoughts: no  Impulsive behaviors: no  Pressured speech:  no    Paranoia:yes  Delusions: yes  AVH:yes    Medical Review Of Systems:  Pertinent items noted in HPI    Psychiatric History:  Diagnose(s): Yes - Paranoid Schizophrenia  Previous Medication Trials: Yes - Terrie Thorpefy, Zyprexa  Previous Psychiatric Hospitalizations: Yes - 4x (last 2000)  Previous Suicide Attempts: No  History of Violence: No  Outpatient Psychiatrist: Yes - Carol Ann Poe MD    Social History:  Marital Status:   Children: 1   Employment Status: retired  Education: high school diploma/GED  Special Ed: no   History: no  Housing Status: Yes - lives with daughter and grandchildren  Developmental History: No  History of " Abuse: No  Access to Gun: Yes - but it is unloaded    Substance Abuse History:  Recreational Drugs: no  Use of Alcohol: no  Rehab History: No  Tobacco Use: Yes - 10 per day  Use of Caffeine: Yes -  5 to 6 cups per day  Use of OTC: Yes - Benadryl  Legal consequences of chemical use: No  Is the patient aware of the biomedical complications associated with substance abuse and mental illness? No    Legal History:  Past Charges/Incarcerations: No  Pending Charges: No    Family Psychiatric History:   No    Collateral:   Yes - daughter at bedside, who brought pt to ED. She is very concerned, due to her mother being well controlled for years on Navane, and now with inability to access said medication, daughter worried for mother's safety as she has begun to hallucinate, regularly, as well as present agitated on multiple occasions     Psychosocial Stressors: health.   Functioning Relationships: good support system    Psychosocial Factors:  Maladaptive or problem behaviors: No  Peer group, social, ethic, cultural, emotional, and health factors: No  Living situation, family constellation, family circumstances/home: Yes - good support system  Recovery environment: Yes -   Community resources used by patient: No  Treatment acceptance/motivation for change: Yes -    Hospital Course: No notes on file         Patient History           Medical as of 12/10/2018     Past Medical History     Diagnosis Date Comments Source    Allergy -- -- Provider    Amblyopia -- -- Provider    Anemia -- -- Provider    Arthritis 02/02/1992 -- Provider    Cataract -- -- Provider    Depression -- -- Provider    Dry eyes -- -- Provider    Dry mouth -- -- Provider    Fibromyalgia 4/17/2014 -- Provider    Fibromyalgia -- -- Provider    GERD (gastroesophageal reflux disease) -- -- Provider    History of psychiatric hospitalization -- -- Provider    Hyperlipidemia 02/02/1992 -- Provider    Hypertension -- -- Provider    Kidney stone -- -- Provider    Migraine  headache -- -- Provider    Osteoporosis -- -- Provider    Psoriatic arthritis 1992 -- Provider    Right knee pain -- post knee replacement surgery (possible rejectiion of metal) Provider    RLS (restless legs syndrome) -- -- Provider    Schizophrenia 1992 stable on meds Provider    Urinary tract infection -- -- Provider          Pertinent Negatives     Diagnosis Date Noted Comments Source    Alopecia 2013 -- Provider    Behavioral problem 2013 -- Provider    Chronic kidney disease 2013 -- Provider    Diabetes mellitus 2014 -- Provider    Diabetic retinopathy 2013 -- Provider    Liver disease 2014 -- Provider    Macular degeneration 2013 -- Provider    Corina 2013 -- Provider    Retinal detachment 2013 -- Provider    Scalp tenderness 2013 -- Provider    Self-harming behavior 2013 -- Provider    STD (sexually transmitted disease) 2014 -- Provider    Strabismus 2013 -- Provider    Suicide attempt 2013 -- Provider    Uveitis 2013 -- Provider    Vaginal infection 2014 -- Provider                  Surgical as of 12/10/2018     Past Surgical History     Procedure Laterality Date Comments Source    Surgery on right knee [Other] -- 1982 -- Provider    cyst removed from right sinus [Other] -- 1982 -- Provider    tumor removed from back left side upper shoulder [Other] -- 2006 -- Provider    HYSTERECTOMY -- -- VAGINAL HYSTERECTOMY WITHOUT BSO - ENDOMETRIOSIS Provider    KNEE SURGERY -- -- -- Provider     SECTION -- -- -- Provider    JOINT REPLACEMENT Right -- knee Provider    COLONOSCOPY N/A 2016 Procedure: COLONOSCOPY;  Surgeon: ANDRIA Connelly MD;  Location: [facility] ([address] [place] [address]);  Service: Endoscopy;  Laterality: N/A; Provider    INJECTION OF ANESTHETIC AGENT INTO SACROILIAC JOINT Right 2018 Procedure: BLOCK, SACROILIAC JOINT-Right- ORAL SEDATION;  Surgeon: Michelle Pineda Jr., MD;   Location: [facility];  Service: Pain Management;  Laterality: Right; Provider    INJECTION OF ANESTHETIC AGENT INTO SACROILIAC JOINT Bilateral 9/27/2018 Procedure: BLOCK, SACROILIAC JOINT-BILATERAL;  Surgeon: Michelle Pineda Jr., MD;  Location: [facility];  Service: Pain Management;  Laterality: Bilateral;  Please keep at 10:00 due to trasnsportation Provider                  Family as of 12/10/2018     Problem Relation Name Age of Onset Comments Source    Colon cancer Mother -- -- -- Provider    Psoriasis Mother -- -- -- Provider    Cancer Mother -- -- colon Provider    Depression Mother -- -- -- Provider    Diabetes Brother -- -- -- Provider    Arthritis Brother -- -- -- Provider    Heart disease Brother -- -- cad Provider    Cancer Father -- -- lymphoma Provider    Stroke Sister -- -- -- Provider    No Known Problems Daughter -- -- -- Provider    Hypertension Neg Hx -- -- -- Provider    Suicide Neg Hx -- -- -- Provider    Amblyopia Neg Hx -- -- -- Provider    Blindness Neg Hx -- -- -- Provider    Cataracts Neg Hx -- -- -- Provider    Glaucoma Neg Hx -- -- -- Provider    Macular degeneration Neg Hx -- -- -- Provider    Retinal detachment Neg Hx -- -- -- Provider    Strabismus Neg Hx -- -- -- Provider            Tobacco Use as of 12/10/2018     Smoking Status Smoking Start Date Smoking Quit Date Packs/Day Years Used    Former Smoker -- 5/19/2012 0.50 10.00    Types Comments Smokeless Tobacco Status Smokeless Tobacco Quit Date Source     Cigarettes stopped smoking 2017.   Former User -- Provider            Alcohol Use as of 12/10/2018     Alcohol Use Drinks/Week Alcohol/Week Comments Source    No -- -- -- Provider    Frequency Standard Drinks Binge Drinking Source      -- -- -- Provider             Drug Use as of 12/10/2018     Drug Use Types Frequency Comments Source    No -- -- -- Provider            Sexual Activity as of 12/10/2018     Sexually Active Birth Control Partners Comments Source    No  Post-menopausal -- -- Provider            Activities of Daily Living as of 12/10/2018     Activities of Daily Living Question Response Comments Source    Patient feels they ought to cut down on drinking/drug use Not Asked -- Provider    Patient annoyed by others criticizing their drinking/drug use Not Asked -- Provider    Patient has felt bad or guilty about drinking/drug use Not Asked -- Provider    Patient has had a drink/used drugs as an eye opener in the AM Not Asked -- Provider    Are you pregnant or think you may be? Not Asked -- Provider    Breast-feeding Not Asked -- Provider            Social Documentation as of 12/10/2018    Exercise:  Childcare.      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 -- Provider            Socioeconomic as of 12/10/2018     Marital Status Spouse Name Number of Children Years Education Education Level Preferred Language Ethnicity Race Source     -- 1 -- -- English /White White Provider    Financial Resource Strain Food Insecurity: Worry Food Insecurity: Inability Transportation Needs: Medical Transportation Needs: Non-medical       -- -- -- -- --             Pertinent History     Question Response Comments    Lives with -- --    Place in Birth Order -- --    Lives in -- --    Number of Siblings -- --    Raised by -- --    Legal Involvement -- --    Childhood Trauma -- --    Criminal History of -- --    Financial Status -- --    Highest Level of Education -- --    Does patient have access to a firearm? -- --     Service -- --    Primary Leisure Activity -- --    Spirituality -- --        Past Medical History:   Diagnosis Date    Allergy     Amblyopia     Anemia     Arthritis 02/02/1992    Cataract     Depression     Dry eyes     Dry mouth     Fibromyalgia 4/17/2014    Fibromyalgia     GERD (gastroesophageal reflux  disease)     History of psychiatric hospitalization     Hyperlipidemia 1992    Hypertension     Kidney stone     Migraine headache     Osteoporosis     Psoriatic arthritis 1992    Right knee pain     post knee replacement surgery (possible rejectiion of metal)    RLS (restless legs syndrome)     Schizophrenia 1992    stable on meds    Urinary tract infection      Past Surgical History:   Procedure Laterality Date    ARTHROPLASTY, KNEE, TOTAL Right 2013    Performed [facility] Philipp [facility]um [facility] at [facility] OR 2ND FLR    BLOCK, SACROILIAC JOINT-BILATERAL Bilateral 2018    [facility] Jr[facility]    BLOCK, SACROILIAC JOINT-Right- ORAL SEDATION Right 2018    [facility] Jr[facility] at [facility]     SECTION      COLONOSCOPY N/A 2016    Procedure: COLONOSCOPY;  Surgeon: ANDRIA Connelly MD;  Location: [facility] (4TH FLR);  Service: Endoscopy;  Laterality: N/A;    COLONOSCOPY N/A 2016    Performed by ANDRIA Connelly MD at [facility] ENDO (4TH FLR)    COLONOSCOPY N/A 2013    Performed by Didier Poole MD at [facility] ENDO (4TH FLR)    cyst removed from right sinus  1982    GRAFT-FEMORAL AUTOLOGOUS BONE GRAFTING Right 2014    [facility] by [facility] at [facility]    HYSTERECTOMY      VAGINAL HYSTERECTOMY WITHOUT BSO - ENDOMETRIOSIS    INJECTION OF ANESTHETIC AGENT INTO SACROILIAC JOINT Right 2018    Procedure: BLOCK, SACROILIAC JOINT-Right- ORAL SEDATION;  Surgeon: Michelle Pineda Jr., MD;  Location: [facility];  Service: Pain Management;  Laterality: Right;    INJECTION OF ANESTHETIC AGENT INTO SACROILIAC JOINT Bilateral 2018    Procedure: BLOCK, SACROILIAC JOINT-BILATERAL;  Surgeon: Michelle Pineda Jr., MD;  Location: [facility];  Service: Pain Management;  Laterality: Bilateral;  Please keep at 10:00 due to trasnsportation    JOINT REPLACEMENT Right     knee     KNEE SURGERY      REVISION-ARTHROPLASTY-KNEE-TOTAL Right 2014    Performed [facility] at [facility]    Surgery on right knee  1982    tumor removed from back left side upper shoulder  2006     Family History     Problem Relation (Age of Onset)    Arthritis Brother    Cancer Mother, Father    Colon cancer Mother    Depression Mother    Diabetes Brother    Heart disease Brother    No Known Problems Daughter    Psoriasis Mother    Stroke Sister        Tobacco Use    Smoking status: Former Smoker     Packs/day: 0.50     Years: 10.00     Pack years: 5.00     Types: Cigarettes     Last attempt to quit: 2012     Years since quittin.5    Smokeless tobacco: Former User    Tobacco comment: stopped smoking .     Substance and Sexual Activity    Alcohol use: No    Drug use: No    Sexual activity: No     Birth control/protection: Post-menopausal     Review of patient's allergies indicates:   Allergen Reactions    Adhesive      Other reaction(s): Rash    Chloromycetin [chloramphenicol sod succinate] Hives    Etanercept      Other reaction(s): recurrent infections    Nickel sutures [surgical stainless steel]      Allergic contact dermatitis       No current facility-administered medications on file prior to encounter.      Current Outpatient Medications on File Prior to Encounter   Medication Sig    alendronate (FOSAMAX) 70 MG tablet Take 1 tablet (70 mg total) by mouth every 7 days.    amLODIPine (NORVASC) 5 MG tablet TK 1 T PO QD    aspirin 325 MG tablet Take 325 mg by mouth once daily.    atorvastatin (LIPITOR) 40 MG tablet Take 1 tablet (40 mg total) by mouth once daily.    benztropine (COGENTIN) 0.5 MG tablet Take 1 tablet (0.5 mg total) by mouth 2 (two) times daily.    multivitamin capsule Take 1 capsule by mouth once daily.    sumatriptan (IMITREX) 50 MG tablet TK 1 T PO ONCE PRF MIGRAINE HA    venlafaxine (EFFEXOR-XR) 150 MG Cp24 Take 1 capsule (150 mg total) by  "mouth once daily.    albuterol 90 mcg/actuation inhaler Inhale 2 puffs into the lungs every 6 (six) hours as needed for Wheezing.    amLODIPine (NORVASC) 5 MG tablet TAKE 1 TABLET BY MOUTH EVERY DAY    butalbital-acetaminophen-caffeine -40 mg (FIORICET, ESGIC) -40 mg per tablet     haloperidol (HALDOL) 5 MG tablet Take 1-2 tablets (5-10 mg total) by mouth 2 (two) times daily.    ropinirole (REQUIP) 3 MG tablet Take 1 tablet 2 hours before bedtime    tiZANidine (ZANAFLEX) 4 MG tablet 1 tab po q 8 h prn leg cramps    topiramate (TOPAMAX) 25 MG tablet Take 75 mg by mouth 2 (two) times daily.    traMADol (ULTRAM) 50 mg tablet 1-2 tabs po q 12 h prn pain    [DISCONTINUED] benztropine (COGENTIN) 0.5 MG tablet Take 1 tablet (0.5 mg total) by mouth 2 (two) times daily.    [DISCONTINUED] OLANZapine (ZYPREXA) 5 MG tablet Take 1 tablet (5 mg total) by mouth 2 (two) times daily.     Psychotherapeutics (From admission, onward)    None        Review of Systems  Strengths and Liabilities: Strength: Patient has positive support network., Liability: Patient is defensive.    Objective:     Vital Signs (Most Recent):  Temp: 98 °F (36.7 °C) (12/10/18 1635)  Pulse: 96 (12/10/18 1635)  Resp: 16 (12/10/18 1635)  BP: (!) 189/85 (12/10/18 1635)  SpO2: 98 % (12/10/18 1635) Vital Signs (24h Range):  Temp:  [98 °F (36.7 °C)] 98 °F (36.7 °C)  Pulse:  [96] 96  Resp:  [16] 16  SpO2:  [98 %] 98 %  BP: (189)/(85) 189/85     Height: 5' 4" (162.6 cm)  Weight: 44.5 kg (98 lb)  Body mass index is 16.82 kg/m².    No intake or output data in the 24 hours ending 12/10/18 1933    Physical Exam   Psychiatric:   Mental Status Exam:  Appearance: unremarkable, older than stated age, casually dressed  Level of Consciousness: alert  Behavior/Cooperation: reluctant to participate  Psychomotor: within normal limits   Speech: loud  Language: english, fluid  Orientation: person, place, situation, day of week, month of year, year  Attention " "Span/Concentration: intact  Memory: Grossly intact  Mood: "agitated"  Affect: irritable  Thought Process: goal-directed  Associations: normal and logical, concrete  Thought Content: delusions: yes, hallucinations: (auditory: yes), paranoid  Fund of Knowledge: Aware of current events  Insight: fair  Judgment: fair        Significant Labs: All pertinent labs within the past 24 hours have been reviewed.    Significant Imaging: I have reviewed all pertinent imaging results/findings within the past 24 hours.    Assessment/Plan:     Paranoid schizophrenia    ASSESSMENT      Bhavna Landry is a 63 y.o. female with a past psychiatric history of Paranoid Schizophrenia, currently presenting with .  Psychiatry was originally consulted to address the patient's symptoms of auditory hallucinations. Pt with long history of medication controlled paranoid schizophrenia, now due to inability to access previous med regimen, pt now presenting with paranoia and auditory hallucinations, in need of revamped med regimen in order to stabilize current psychotic symptomatology.     IMPRESSION  Paranoid Schizophrenia     RECOMMENDATION(S)       1. Scheduled Medication(s):  Haldol 5mg PO BID for psychotic features  Cogentin 0.5mg PO BID for EPS  Amlodipine Besylate 5mg PO daily  Ropinirole 3mg PO qHS  Atorvastatin 40mg PO daily  Aspirin 325mg PO daily  Alendronate 70mg PO qweekly     2. PRN Medication(s):  -Haldol 5mg PO/IM q6hr PRN for non-redirectable agitation  -Ativan 2mg PO/IM q4hr PRN for non-redirectable agitation  -Ramelteon 8mg PO qHS for insomnia     3.  Monitor:  Please obtain daily EKG to monitor QTc     4. Legal Status/Precaution(s):  Cont [facility] as pt is gravely disabled due to a mental illness. Will admit to [facility], once medically cleared by ED.        Total Time:  60 minutes      In cases of emergency, daily coverage provided by Acute/ER Psych MD, NP, or SW, with contact numbers located in [facility] On [facility] " Schedule.    Case discussed with psychiatry staff: MD Braulio Gruber MD, PATRICIA  [facility], PGY-2  [facility]  Pager Number [phone]

## 2018-12-11 NOTE — CONSULTS
Please see my previous consult note.    Braulio Uriarte MD, PATRICIA  [facility], PGY-2  [facility]  Pager Number [phone]

## 2018-12-11 NOTE — HPI
"Bhavna Landry is a 63 y.o. female with a past psychiatric history of Paranoid Schizophrenia, currently presenting with .  Psychiatry was originally consulted to address the patient's symptoms of auditory hallucinations.     Per ED RN(s):  Pt ran out of her nevane 2 weeks ago for schizophrenia . Pt psychiatrist changed her med to abilify and symptoms worsened. Pt was recently changed to zyprexa for past 3 days with no improvement. Pt talking to herself and having hallucinations.      Per ED MD:  Patient is a 63 year old female with a pmhx of schizophrenia who presents to the ED for psychiatric evaluation. Patient recently d/c Navane which she has been on for over a decade because it was on back order. Her pscyhiatrist presribed her new medication, but patient and her family feels as if she is declining. Over the weekend, patient has been going back in forth in the house, slamming doors, talking to herself. She however denies any auditory or visual hallucinations, suicidal ideations, homicidal ideations. She presents to the ED with her daughter who she stays with  and who is concerned. Patient has a chronic cough, unchanged from baseline. No fever or chills. Smokes 10 cigs per day.     Per Psych MD:  Upon initiation of interview, pt was seated next to ED bed, dressed in casual clothing, in no apparent distress, daughter at bedside. Pt reports a long history of paranoid schizophrenia controlled on Navane, however, recently has been unable to fill said prescription, being told that said medication is no longer in production. As of late, pt has been "talking outloud," stating "don't do anything to my daughter, I dont want to hurt anybody, not my granddaughter, I wanna protect her from old people bc I dont want her to get gray hair." Additionally, pt states that she has been agitated due to feeling like "my head and body are being pulled apart." Pt follows with Dr. Poe, who has been utilizing different medications " in an attempt to stabilize patient, on an outpt basis, having tried Abilify and Zyprexa, to no avail. Pt denies any other psychiatric symptomatology for depression (SIGECAPS), padmini (DIGFAST), suicidality or homicidality.     Psychiatric Review Of Systems - Is patient experiencing or having changes in:  sleep: no  appetite: no  weight: no  energy/anergy: no  interest/pleasure/anhedonia: no  somatic symptoms: no  guilty/hopelessness: no  concentration: no  S.I.B.s/risky behavior: no  SI/SA:  no     anxiety/panic: yes  Agoraphobia:  no  Social phobia:  no  Recurrent nightmares:  no  hyper startle response:  no  Avoidance: no  Recurrent thoughts:  no  Recurrent behaviors:  no     Irritability: yes  Racing thoughts: no  Impulsive behaviors: no  Pressured speech:  no     Paranoia:yes  Delusions: yes  AVH:yes     Medical Review Of Systems:  Pertinent items noted in HPI     Psychiatric History:  Diagnose(s): Yes - Paranoid Schizophrenia  Previous Medication Trials: Yes - Navane, Abilify, Zyprexa  Previous Psychiatric Hospitalizations: Yes - 4x (last 2000)  Previous Suicide Attempts: No  History of Violence: No  Outpatient Psychiatrist: Yes - Carol Ann Poe MD     Social History:  Marital Status:   Children: 1   Employment Status: retired  Education: high school diploma/GED  Special Ed: no   History: no  Housing Status: Yes - lives with daughter and grandchildren  Developmental History: No  History of Abuse: No  Access to Gun: Yes - but it is unloaded     Substance Abuse History:  Recreational Drugs: no  Use of Alcohol: no  Rehab History: No  Tobacco Use: Yes - 10 per day  Use of Caffeine: Yes -  5 to 6 cups per day  Use of OTC: Yes - Benadryl  Legal consequences of chemical use: No  Is the patient aware of the biomedical complications associated with substance abuse and mental illness? No     Legal History:  Past Charges/Incarcerations: No  Pending Charges: No     Family Psychiatric History:    No     Collateral:   Yes - daughter at bedside, who brought pt to ED. She is very concerned, due to her mother being well controlled for years on Navane, and now with inability to access said medication, daughter worried for mother's safety as she has begun to hallucinate, regularly, as well as present agitated on multiple occasions      Psychosocial Stressors: health.   Functioning Relationships: good support system     Psychosocial Factors:  Maladaptive or problem behaviors: No  Peer group, social, ethic, cultural, emotional, and health factors: No  Living situation, family constellation, family circumstances/home: Yes - good support system  Recovery environment: Yes -   Community resources used by patient: No  Treatment acceptance/motivation for change: Yes -

## 2018-12-12 ENCOUNTER — PATIENT MESSAGE (OUTPATIENT)
Dept: PSYCHIATRY | Facility: CLINIC | Age: 63
End: 2018-12-12

## 2018-12-12 PROCEDURE — 25000003 PHARM REV CODE 250: Performed by: STUDENT IN AN ORGANIZED HEALTH CARE EDUCATION/TRAINING PROGRAM

## 2018-12-12 PROCEDURE — 99233 SBSQ HOSP IP/OBS HIGH 50: CPT | Mod: ,,, | Performed by: PSYCHIATRY & NEUROLOGY

## 2018-12-12 PROCEDURE — 90853 GROUP PSYCHOTHERAPY: CPT | Mod: ,,, | Performed by: PSYCHOLOGIST

## 2018-12-12 PROCEDURE — 12400001 HC PSYCH SEMI-PRIVATE ROOM

## 2018-12-12 PROCEDURE — 25000003 PHARM REV CODE 250: Performed by: PSYCHIATRY & NEUROLOGY

## 2018-12-12 RX ORDER — RISPERIDONE 1 MG/1
3 TABLET ORAL NIGHTLY
Status: DISCONTINUED | OUTPATIENT
Start: 2018-12-12 | End: 2018-12-15

## 2018-12-12 RX ADMIN — FOLIC ACID 1 MG: 1 TABLET ORAL at 09:12

## 2018-12-12 RX ADMIN — VENLAFAXINE HYDROCHLORIDE 150 MG: 150 CAPSULE, EXTENDED RELEASE ORAL at 09:12

## 2018-12-12 RX ADMIN — ASPIRIN 325 MG ORAL TABLET 325 MG: 325 PILL ORAL at 09:12

## 2018-12-12 RX ADMIN — AMLODIPINE BESYLATE 10 MG: 10 TABLET ORAL at 09:12

## 2018-12-12 RX ADMIN — HYDROXYZINE PAMOATE 50 MG: 50 CAPSULE ORAL at 02:12

## 2018-12-12 RX ADMIN — THERA TABS 1 TABLET: TAB at 09:12

## 2018-12-12 RX ADMIN — ACETAMINOPHEN 650 MG: 325 TABLET ORAL at 02:12

## 2018-12-12 RX ADMIN — ACETAMINOPHEN 650 MG: 325 TABLET ORAL at 09:12

## 2018-12-12 RX ADMIN — RISPERIDONE 3 MG: 1 TABLET ORAL at 08:12

## 2018-12-12 RX ADMIN — ATORVASTATIN CALCIUM 40 MG: 20 TABLET, FILM COATED ORAL at 01:12

## 2018-12-12 NOTE — PROGRESS NOTES
12/12/18 0900 12/12/18 1000 12/12/18 1100   [facility] Group Therapy   Group Name Community Reintegration Mental Awareness Education   Specific Interventions Current Events Cognitive Stimulation Training Guided Imagery/Relaxation   Participation Level Active;Appropriate Active;Appropriate;Attentive Active;Appropriate;Attentive   Participation Quality Cooperative;Social Cooperative;Social Cooperative;Social   Insight/Motivation Good Good Good   Affect/Mood Display Appropriate Appropriate Appropriate   Cognition Alert Alert Alert       12/12/18 1200   [facility] Group Therapy   Group Name Therapeutic Recreation   Specific Interventions Skilled Activity Crafts   Participation Level Active;Appropriate;Attentive   Participation Quality Cooperative;Social   Insight/Motivation Good   Affect/Mood Display Appropriate   Cognition Alert

## 2018-12-12 NOTE — PROGRESS NOTES
Group Psychotherapy (PhD/LCSW)    Site: [facility]    Clinical status of patient: Inpatient    Date: 12/12/2018    Group Focus: Life Skills    Length of service: [id number] - 35-40 minutes    Number of patients in attendance: 10    Referred by: Acute Psychiatry Unit Treatment Team    Target symptoms: Psychosis    Patient's response to treatment: Active Listening, Self-disclosure     Progress toward goals: Progressing slowly    Interval History:Pt appeared alert and attentive in group. Pt participated briefly but appropriately when prompted n a group discussion of anger management strategies.  Pt stated that she tends to her anger in and then later talk it over with a friend.       Diagnosis: Schizophrenia     Plan: Continue treatment on [facility]

## 2018-12-12 NOTE — PROGRESS NOTES
[facility]  Psychiatry  Progress Note    Patient Name: Bhavna Landry  MRN: [id number]   Code Status: Full Code  Admission Date: 12/10/2018  Hospital Length of Stay: 2 days  Expected Discharge Date:   Attending Physician: Amador Arita MD  Primary Care Provider: Sadaf Vargas MD    Current Legal Status: [facility]    Patient information was obtained from patient and EHR.     Subjective:     Principal Problem:Schizophrenia, chronic condition with acute exacerbation    Chief Complaint: AH    HPI: Bhavna Landry is a 63 y.o. female with a past psychiatric history of Paranoid Schizophrenia, currently presenting with AH.  Psychiatry was originally consulted to address the patient's symptoms of auditory hallucinations.     Per ED RN(s):  Pt ran out of her nevane 2 weeks ago for schizophrenia . Pt psychiatrist changed her med to abilify and symptoms worsened. Pt was recently changed to zyprexa for past 3 days with no improvement. Pt talking to herself and having hallucinations.      Per ED MD:  Patient is a 63 year old female with a pmhx of schizophrenia who presents to the ED for psychiatric evaluation. Patient recently d/c Navane which she has been on for over a decade because it was on back order. Her pscyhiatrist presribed her new medication, but patient and her family feels as if she is declining. Over the weekend, patient has been going back in forth in the house, slamming doors, talking to herself. She however denies any auditory or visual hallucinations, suicidal ideations, homicidal ideations. She presents to the ED with her daughter who she stays with  and who is concerned. Patient has a chronic cough, unchanged from baseline. No fever or chills. Smokes 10 cigs per day.     Per Psych MD:  Upon initiation of interview, pt was seated next to ED bed, dressed in casual clothing, in no apparent distress, daughter at bedside. Pt reports a long history of paranoid schizophrenia controlled on Navane,  "however, recently has been unable to fill said prescription, being told that said medication is no longer in production. As of late, pt has been "talking outloud," stating "don't do anything to my daughter, I dont want to hurt anybody, not my granddaughter, I wanna protect her from old people bc I dont want her to get gray hair." Additionally, pt states that she has been agitated due to feeling like "my head and body are being pulled apart." Pt follows with Dr. Poe, who has been utilizing different medications in an attempt to stabilize patient, on an outpt basis, having tried Abilify and Zyprexa, to no avail. Pt denies any other psychiatric symptomatology for depression (SIGECAPS), padmini (DIGFAST), suicidality or homicidality.     Psychiatric Review Of Systems - Is patient experiencing or having changes in:  sleep: no  appetite: no  weight: no  energy/anergy: no  interest/pleasure/anhedonia: no  somatic symptoms: no  guilty/hopelessness: no  concentration: no  S.I.B.s/risky behavior: no  SI/SA:  no     anxiety/panic: yes  Agoraphobia:  no  Social phobia:  no  Recurrent nightmares:  no  hyper startle response:  no  Avoidance: no  Recurrent thoughts:  no  Recurrent behaviors:  no     Irritability: yes  Racing thoughts: no  Impulsive behaviors: no  Pressured speech:  no     Paranoia:yes  Delusions: yes  AVH:yes     Medical Review Of Systems:  Pertinent items noted in HPI     Psychiatric History:  Diagnose(s): Yes - Paranoid Schizophrenia  Previous Medication Trials: Yes - Serenity Thorpelify, Zyprexa  Previous Psychiatric Hospitalizations: Yes - 4x (last 2000)  Previous Suicide Attempts: No  History of Violence: No  Outpatient Psychiatrist: Yes - Carol Ann Poe MD     Social History:  Marital Status:   Children: 1   Employment Status: retired  Education: high school diploma/GED  Special Ed: no   History: no  Housing Status: Yes - lives with daughter and grandchildren  Developmental History: No  History " of Abuse: No  Access to Gun: Yes - but it is unloaded     Substance Abuse History:  Recreational Drugs: no  Use of Alcohol: no  Rehab History: No  Tobacco Use: Yes - 10 per day  Use of Caffeine: Yes -  5 to 6 cups per day  Use of OTC: Yes - Benadryl  Legal consequences of chemical use: No  Is the patient aware of the biomedical complications associated with substance abuse and mental illness? No     Legal History:  Past Charges/Incarcerations: No  Pending Charges: No     Family Psychiatric History:   No     Collateral:   Yes - daughter at bedside, who brought pt to ED. She is very concerned, due to her mother being well controlled for years on Navane, and now with inability to access said medication, daughter worried for mother's safety as she has begun to hallucinate, regularly, as well as present agitated on multiple occasions      Psychosocial Stressors: health.   Functioning Relationships: good support system     Psychosocial Factors:  Maladaptive or problem behaviors: No  Peer group, social, ethic, cultural, emotional, and health factors: No  Living situation, family constellation, family circumstances/home: Yes - good support system  Recovery environment: Yes -   Community resources used by patient: No  Treatment acceptance/motivation for change: Yes -    Hospital Course: 12/11/2018  Seen for initial evaluation with treatment team. Patient with long history of schizophrenia treated with Navane (also depression on Effexor XR) who was in their usual state of health until she could not access the Navane due to a shortage at the pharmacy.  Off her neuroleptic she quickly decompensated.  Outpatient pscyhiatrist, Dr. Poe (who I spoke with today to discuss the case), attempted to restabilize her on an outpatient basis but she failed brief trials of Abilify and Zyprexa (only a few days in duration each).  She was initiated on Haldol last night but I spoke with Dr. Poe and will first initiate a Risperdal trial  "prior to moving to another typical neuroleptic.  Collateral obtained from daughter.    2018  Staff reports poor sleep (maybe 3 hours overnight) and that patient was changing clothes frequently. Staff denies patient with delusional content, but concerned for hypomania and intrusiveness. Patient seen and examined with treatment team; chart and nursing notes reviewed. No distress noted, and patient was agreeable and cooperative with interview. Patient states they are feeling "alright" this morning. Patient reports poor sleep sleep overnight, but feels it may be attributed to being a strange environment. Has a stable appetite. No somatic complaints today. Tolerating medications without adverse side effects. Denies SI, HI, AVH, delusions, or paranoia. Patient has been compliant with medications.    Interval History: See hospital course    Family History     Problem Relation (Age of Onset)    Arthritis Brother    Cancer Mother, Father    Colon cancer Mother    Depression Mother    Diabetes Brother    Heart disease Brother    No Known Problems Daughter    Psoriasis Mother    Stroke Sister        Tobacco Use    Smoking status: Former Smoker     Packs/day: 0.50     Years: 10.00     Pack years: 5.00     Types: Cigarettes     Last attempt to quit: 2012     Years since quittin.5    Smokeless tobacco: Former User    Tobacco comment: stopped smoking 2017.     Substance and Sexual Activity    Alcohol use: No    Drug use: No    Sexual activity: No     Partners: Male     Birth control/protection: Post-menopausal     Psychotherapeutics (From admission, onward)    Start     Stop Route Frequency Ordered    18 2100  risperiDONE tablet 2 mg      -- Oral Nightly 18 1220    18 1130  venlafaxine 24 hr capsule 150 mg      -- Oral Daily 18 1117    12/10/18 2310  haloperidol tablet 5 mg  (Med - Acute  Behavioral Management)      -- Oral Every 4 hours PRN 12/10/18 2310    12/10/18 2310  LORazepam " "tablet 2 mg  (Med - Acute  Behavioral Management)      -- Oral Every 4 hours PRN 12/10/18 2310    12/10/18 2310  haloperidol lactate injection 5 mg  (Med - Acute  Behavioral Management)      -- IM Every 4 hours PRN 12/10/18 2310    12/10/18 2310  lorazepam (ATIVAN) injection 2 mg  (Med - Acute  Behavioral Management)      -- IM Every 4 hours PRN 12/10/18 2310           Review of Systems   Constitutional: Negative for chills and fever.   Respiratory: Negative for cough and shortness of breath.    Cardiovascular: Negative for chest pain.   Gastrointestinal: Positive for constipation. Negative for abdominal pain, diarrhea, nausea and vomiting.   Neurological: Negative for headaches.   Psychiatric/Behavioral: Positive for sleep disturbance. Negative for behavioral problems, dysphoric mood, hallucinations and suicidal ideas. The patient is not nervous/anxious.      Objective:     Vital Signs (Most Recent):  Temp: 97.5 °F (36.4 °C) (12/12/18 0720)  Pulse: 102 (12/12/18 0720)  Resp: 18 (12/12/18 0720)  BP: (!) 165/88 (12/12/18 0720) Vital Signs (24h Range):  Temp:  [97.5 °F (36.4 °C)-97.6 °F (36.4 °C)] 97.5 °F (36.4 °C)  Pulse:  [] 102  Resp:  [16-18] 18  BP: (153-168)/(71-89) 165/88     Height: 5' 4" (162.6 cm)  Weight: 44.5 kg (98 lb 1.7 oz)  Body mass index is 16.84 kg/m².    No intake or output data in the 24 hours ending 12/12/18 0858    Physical Exam   Musculoskeletal:   AIMS (12/12): 0   Psychiatric:   Mental Status Exam:  Arousal: alert  Sensorium/Orientation: grossly intact  Behavior/Cooperation: normal, friendly and cooperative   Psychomotor: unremarkable and within normal limits  Speech: conversational rate, tone, and volume  Language: answered questions appropriately  Mood: "alright"   Affect: Appropriate and reactive to content  Thought Process: linear, logical  Thought Content:   Auditory hallucinations: NO  Visual hallucinations: NO  Paranoia: NO  Delusions:  NO  Suicidal ideation: NO  Homicidal " ideation: NO  Attention/Concentration:  intact  able to focus  Memory: grossly intact     Insight: Intact  Judgment:Intact         Significant Labs: Labs:    No results found for: LITHIUM, PHENYTOIN, PHENOBARB, VALPROATE, CBMZ    CBC:   Recent Labs   Lab 12/11/18  0940   WBC 8.69   RBC 5.11   HGB 15.3   HCT 44.8      MCV 88   MCH 29.9   MCHC 34.2     CMP:   Recent Labs   Lab 12/11/18  0940   *   CALCIUM 10.6*   ALBUMIN 3.6   PROT 6.9   *   K 3.8   CO2 25   CL 96   BUN 7*   CREATININE 1.0   ALKPHOS 93   ALT 25   AST 36   BILITOT 0.4     Labs within the past 24 hours have been reviewed.    Significant Imaging: None    Assessment/Plan:     * Schizophrenia, chronic condition with acute exacerbation      Bhavna Landry is a 63 y.o. female with a past psychiatric history of Paranoid Schizophrenia, who presented with AH.  Psychiatry was originally consulted to address the patient's symptoms of auditory hallucinations. Pt with long history of medication controlled paranoid schizophrenia, now due to inability to access previous med regimen, pt now presenting with paranoia and auditory hallucinations, in need of revamped med regimen in order to stabilize current psychotic symptomatology.     - Continue Effexor 150mg daily  - Started on Haldol, but changed to Risperdal 2mg qHS for psychotic features. Risperdal increased to 3mg qHS on 12/12.       PRN Medication(s):  -Haldol 5mg PO/IM q6hr PRN for non-redirectable agitation  -Ativan 2mg PO/IM q4hr PRN for non-redirectable agitation  -Vistaril 50mg qHS for insomnia        Tobacco use disorder    PRN Nicotine patch      Migraine without aura and without status migrainosus, not intractable    - Zanaflex 4mg Q8 PRN for headache  - Tylenol 650 mg PRN Q6 for headache     Hyperlipidemia    - Restarted home Atorvastatin 40 mg daily  -  mg daily     Essential hypertension    - Norvasc 10mg daily     Osteoarthritis    - Tylenol 650 mg Q6 PRN as needed for  pain          Need for Continued Hospitalization:   Psychiatric illness continues to pose a potential threat to life or bodily function, of self or others, thereby requiring the need for continued inpatient psychiatric hospitalization. and Requires ongoing hospitalization for stabilization of medications.    Anticipated Disposition: Still a Patient     Total time:  15 minutes with greater than 50% of this time spent in counseling and/or coordination of care.     Zak Cosby MD  [facility]  PGY-4  12/12/2018

## 2018-12-12 NOTE — SUBJECTIVE & OBJECTIVE
Interval History: See hospital course    Family History     Problem Relation (Age of Onset)    Arthritis Brother    Cancer Mother, Father    Colon cancer Mother    Depression Mother    Diabetes Brother    Heart disease Brother    No Known Problems Daughter    Psoriasis Mother    Stroke Sister        Tobacco Use    Smoking status: Former Smoker     Packs/day: 0.50     Years: 10.00     Pack years: 5.00     Types: Cigarettes     Last attempt to quit: 2012     Years since quittin.5    Smokeless tobacco: Former User    Tobacco comment: stopped smoking 2017.     Substance and Sexual Activity    Alcohol use: No    Drug use: No    Sexual activity: No     Partners: Male     Birth control/protection: Post-menopausal     Psychotherapeutics (From admission, onward)    Start     Stop Route Frequency Ordered    18 2100  risperiDONE tablet 2 mg      -- Oral Nightly 18 1220    18 1130  venlafaxine 24 hr capsule 150 mg      -- Oral Daily 18 1117    12/10/18 2310  haloperidol tablet 5 mg  (Med - Acute  Behavioral Management)      -- Oral Every 4 hours PRN 12/10/18 2310    12/10/18 2310  LORazepam tablet 2 mg  (Med - Acute  Behavioral Management)      -- Oral Every 4 hours PRN 12/10/18 2310    12/10/18 2310  haloperidol lactate injection 5 mg  (Med - Acute  Behavioral Management)      -- IM Every 4 hours PRN 12/10/18 2310    12/10/18 2310  lorazepam (ATIVAN) injection 2 mg  (Med - Acute  Behavioral Management)      -- IM Every 4 hours PRN 12/10/18 2310           Review of Systems   Constitutional: Negative for chills and fever.   Respiratory: Negative for cough and shortness of breath.    Cardiovascular: Negative for chest pain.   Gastrointestinal: Positive for constipation. Negative for abdominal pain, diarrhea, nausea and vomiting.   Neurological: Negative for headaches.   Psychiatric/Behavioral: Positive for sleep disturbance. Negative for behavioral problems, dysphoric mood, hallucinations  "and suicidal ideas. The patient is not nervous/anxious.      Objective:     Vital Signs (Most Recent):  Temp: 97.5 °F (36.4 °C) (12/12/18 0720)  Pulse: 102 (12/12/18 0720)  Resp: 18 (12/12/18 0720)  BP: (!) 165/88 (12/12/18 0720) Vital Signs (24h Range):  Temp:  [97.5 °F (36.4 °C)-97.6 °F (36.4 °C)] 97.5 °F (36.4 °C)  Pulse:  [] 102  Resp:  [16-18] 18  BP: (153-168)/(71-89) 165/88     Height: 5' 4" (162.6 cm)  Weight: 44.5 kg (98 lb 1.7 oz)  Body mass index is 16.84 kg/m².    No intake or output data in the 24 hours ending 12/12/18 0858    Physical Exam   Musculoskeletal:   AIMS (12/12): 0   Psychiatric:   Mental Status Exam:  Arousal: alert  Sensorium/Orientation: grossly intact  Behavior/Cooperation: normal, friendly and cooperative   Psychomotor: unremarkable and within normal limits  Speech: conversational rate, tone, and volume  Language: answered questions appropriately  Mood: "alright"   Affect: Appropriate and reactive to content  Thought Process: linear, logical  Thought Content:   Auditory hallucinations: NO  Visual hallucinations: NO  Paranoia: NO  Delusions:  NO  Suicidal ideation: NO  Homicidal ideation: NO  Attention/Concentration:  intact  able to focus  Memory: grossly intact     Insight: Intact  Judgment:Intact         Significant Labs: Labs:    No results found for: LITHIUM, PHENYTOIN, PHENOBARB, VALPROATE, CBMZ    CBC:   Recent Labs   Lab 12/11/18  0940   WBC 8.69   RBC 5.11   HGB 15.3   HCT 44.8      MCV 88   MCH 29.9   MCHC 34.2     CMP:   Recent Labs   Lab 12/11/18  0940   *   CALCIUM 10.6*   ALBUMIN 3.6   PROT 6.9   *   K 3.8   CO2 25   CL 96   BUN 7*   CREATININE 1.0   ALKPHOS 93   ALT 25   AST 36   BILITOT 0.4     Labs within the past 24 hours have been reviewed.    Significant Imaging: None  "

## 2018-12-12 NOTE — PROGRESS NOTES
12/11/18 2000   [facility] Group Therapy   Group Name Community Reintegration   Specific Interventions Current Events   Participation Level None   Participation Quality Other (see comments)  (in the shower)

## 2018-12-12 NOTE — PLAN OF CARE
Problem: Adult Behavioral Health Plan of Care  Goal: Plan of Care Review  Outcome: Ongoing (interventions implemented as appropriate)  Observed awake and alert. Affect flat, mood anxious. Pt denied SI/HI, A/V hallucinations. No agitation or hostile behavior noted. Took scheduled medications without any problems. Pt observed asleep in bed at 2200, at 2230 observed exercising in her room. Encouraged to lay in bed to promote rest. Pt complained of hip pain and insomnia. Medication given per MD's orders ( see MAR). Presently awake in her room sitting in a chair. Free from falls/injury. Safety maintained. Continue to monitor.

## 2018-12-12 NOTE — PLAN OF CARE
12/12/18 1600   [facility] Group Therapy   Group Name Medication   Participation Level Supportive;Appropriate;Attentive;Sharing   Participation Quality Cooperative   Insight/Motivation Good   Affect/Mood Display Appropriate   Cognition Alert

## 2018-12-12 NOTE — HOSPITAL COURSE
"12/11/2018  Seen for initial evaluation with treatment team. Patient with long history of schizophrenia treated with Navane (also depression on Effexor XR) who was in their usual state of health until she could not access the Navane due to a shortage at the pharmacy.  Off her neuroleptic she quickly decompensated.  Outpatient pscyhiatrist, Dr. Poe (who I spoke with today to discuss the case), attempted to restabilize her on an outpatient basis but she failed brief trials of Abilify and Zyprexa (only a few days in duration each).  She was initiated on Haldol last night but I spoke with Dr. Poe and will first initiate a Risperdal trial prior to moving to another typical neuroleptic.  Collateral obtained from daughter.    12/12/2018  Staff reports poor sleep (maybe 3 hours overnight) and that patient was changing clothes frequently. Staff denies patient with delusional content, but concerned for hypomania and intrusiveness. Patient seen and examined with treatment team; chart and nursing notes reviewed. No distress noted, and patient was agreeable and cooperative with interview. Patient states they are feeling "alright" this morning. Patient reports poor sleep sleep overnight, but feels it may be attributed to being a strange environment. Has a stable appetite. No somatic complaints today. Tolerating medications without adverse side effects. Denies SI, HI, AVH, delusions, or paranoia. Patient has been compliant with medications.    12/13/2018  Patient under good behavioral control. Bizarre affect at times, sometimes seen staring into the the nurses station. Patient denies any pain or discomfort. Guarded, but can be pleasant and interacts appropriately with staff and peers. Did not displayed any signs of padmini and did not expressed any delusional thoughts to staff yesterday. Attending groups, taking medications, and denies SI/HI/AV hallucinations. Reports improved sleep, and states she got ~5 hours overnight. " "Stable appetite. Denies any somatic complaints, but does express some concern for constipation. Patient does request medication to help with sleep and "nerves;" states Benadryl was beneficial in the past.    12/14/2018  Patient has not been a management problem. The patient is medication compliant; no PRN medications administered. Patient endorsed feeling anxious last night with poor sleep related to her anxiety. Staff reports that patient appeared guarded and more withdrawn in the evening hour; did not engage with her peers. No manic or disorganized behavior displayed. Her thought content remains appropriate. Daughter plans on coming to visit this weekend and is agreeable to family meeting next week. With treatment team, patient reports doing "better" today. Reports she routinely sleeps sitting up, but alternates with lying down. States she is feeling more like herself today and thinking clearly. Only complaint is Left leg cramps during the day and restless legs at night.    12/15/2018  Patient reports sleep improved though staff states she was still up and down and noted to be sleeping sitting up.  She states she feels like "the ground is moving under my feet" when she walks - had more difficulty characterizing.  Visitor feedback form from daughter reviewed, per daughter she may be guarded about delusional material in effort to secure discharge.  Patient does admit to some paranoia upon my interview but is hesitant to discuss further.  She is amenable to dose titration of risperdal to address.      12/16/2018  Patient's sleep appears to be improved.  She complains about what sounds like somatic delusions - "people are moving through my body".  She asks for medication in the morning - we discussed splitting her risperdal dose and she is amenable to this.  She denies SI/HI.  She states daughter visited.    12/17/2018  Per staff, patient slept 8 hours. She remains anxious but cooperative on the unit. Does attend " "groups, but with less participation in the evenings. Daughter visited over the weekend and reports patient "mostly" back to baseline. Med compliant, good hygiene,and good appetite. Denies SI/HI/AVH, bright affect, thoughts are linear. Denies any depression. Feels mood is "good." Denies any delusions or paranoia. Poor insight, and does not feel like she has schizophrenia, but acknowledges that medications are helpful. Some illogical statements involving hypnotizing herself and experiencing people moving through her body.    12/18/2018   Per staff, patient slept 8 hours. She remains anxious but cooperative. Denies AVH's but does appear to be paranoid. Patient socially isolates during the evening. Follows direction and attends group with minimal participation. Med compliant, good hygiene, and good appetite. Family meeting held with treatment team, patient, and patient's daughter. Patient continued to endorse some delusional content regarding people moving in and out of her body; does state it is improving and that it occurs mainly in the middle of the day. Patient expressed desire to return home today with family and continue care on an outpatient basis. Able to contract for safety and verbalize a comprehensive safety plan. Agreeable to increase of medication. Denies any SI/HI, intent, or plan.  "

## 2018-12-12 NOTE — PLAN OF CARE
Problem: Adult Behavioral Health Plan of Care  Goal: Plan of Care Review  Outcome: Ongoing (interventions implemented as appropriate)  Pt under good behavioral control. Pt with bizarre affect at times, sometimes seen staring into the the nurses station. Pt denies any pain or discomfort, guarded but can be pleasant and interacts appropriately with staff and peers. Pt has not displayed any signs of padmini, has not expressed any delusional thoughts. Pt attending groups, taking medications, denies SI/HI/AV hallucinations. Pt remains free from falls or injuries.

## 2018-12-13 PROCEDURE — 25000003 PHARM REV CODE 250: Performed by: STUDENT IN AN ORGANIZED HEALTH CARE EDUCATION/TRAINING PROGRAM

## 2018-12-13 PROCEDURE — 97165 OT EVAL LOW COMPLEX 30 MIN: CPT

## 2018-12-13 PROCEDURE — 97150 GROUP THERAPEUTIC PROCEDURES: CPT

## 2018-12-13 PROCEDURE — 25000242 PHARM REV CODE 250 ALT 637 W/ HCPCS: Performed by: STUDENT IN AN ORGANIZED HEALTH CARE EDUCATION/TRAINING PROGRAM

## 2018-12-13 PROCEDURE — 12400001 HC PSYCH SEMI-PRIVATE ROOM

## 2018-12-13 PROCEDURE — 99233 SBSQ HOSP IP/OBS HIGH 50: CPT | Mod: ,,, | Performed by: PSYCHIATRY & NEUROLOGY

## 2018-12-13 PROCEDURE — 25000003 PHARM REV CODE 250: Performed by: PSYCHIATRY & NEUROLOGY

## 2018-12-13 PROCEDURE — 90853 GROUP PSYCHOTHERAPY: CPT | Mod: ,,, | Performed by: PSYCHOLOGIST

## 2018-12-13 RX ORDER — DIPHENHYDRAMINE HCL 25 MG
25 CAPSULE ORAL 3 TIMES DAILY PRN
Status: DISCONTINUED | OUTPATIENT
Start: 2018-12-13 | End: 2018-12-18 | Stop reason: HOSPADM

## 2018-12-13 RX ORDER — DIPHENHYDRAMINE HCL 50 MG
50 CAPSULE ORAL NIGHTLY
Status: DISCONTINUED | OUTPATIENT
Start: 2018-12-13 | End: 2018-12-18 | Stop reason: HOSPADM

## 2018-12-13 RX ORDER — BACITRACIN 500 [USP'U]/G
OINTMENT TOPICAL
Status: DISCONTINUED | OUTPATIENT
Start: 2018-12-13 | End: 2018-12-18 | Stop reason: HOSPADM

## 2018-12-13 RX ADMIN — DIPHENHYDRAMINE HYDROCHLORIDE 50 MG: 50 CAPSULE ORAL at 08:12

## 2018-12-13 RX ADMIN — THERA TABS 1 TABLET: TAB at 09:12

## 2018-12-13 RX ADMIN — AMLODIPINE BESYLATE 10 MG: 10 TABLET ORAL at 09:12

## 2018-12-13 RX ADMIN — ASPIRIN 325 MG ORAL TABLET 325 MG: 325 PILL ORAL at 09:12

## 2018-12-13 RX ADMIN — ALBUTEROL SULFATE 2 PUFF: 90 AEROSOL, METERED RESPIRATORY (INHALATION) at 11:12

## 2018-12-13 RX ADMIN — VENLAFAXINE HYDROCHLORIDE 150 MG: 150 CAPSULE, EXTENDED RELEASE ORAL at 09:12

## 2018-12-13 RX ADMIN — FOLIC ACID 1 MG: 1 TABLET ORAL at 09:12

## 2018-12-13 RX ADMIN — ATORVASTATIN CALCIUM 40 MG: 20 TABLET, FILM COATED ORAL at 09:12

## 2018-12-13 RX ADMIN — RISPERIDONE 3 MG: 1 TABLET ORAL at 08:12

## 2018-12-13 RX ADMIN — BACITRACIN: 500 OINTMENT TOPICAL at 04:12

## 2018-12-13 NOTE — PROGRESS NOTES
12/13/18 0900 12/13/18 1000 12/13/18 1100   [facility] Group Therapy   Group Name Community Reintegration Mental Awareness Stress Management   Specific Interventions Current Events Cognitive Stimulation Training Guided Imagery/Relaxation   Participation Level Active;Supportive;Appropriate Active;Supportive;Appropriate Active;Supportive;Appropriate   Participation Quality Cooperative;Social Cooperative;Social Cooperative   Insight/Motivation Good Good Good   Affect/Mood Display Appropriate Appropriate Appropriate   Cognition Alert;Oriented Alert;Oriented Alert;Oriented       12/13/18 1300   [facility] Group Therapy   Group Name Therapeutic Recreation   Specific Interventions Skilled Activity Creative Expression   Participation Level Active;Supportive;Appropriate   Participation Quality Cooperative;Social   Insight/Motivation Good   Affect/Mood Display Appropriate   Cognition Alert;Oriented

## 2018-12-13 NOTE — ASSESSMENT & PLAN NOTE
Bhavna Landry is a 63 y.o. female with a past psychiatric history of Paranoid Schizophrenia, who presented with AH.  Psychiatry was originally consulted to address the patient's symptoms of auditory hallucinations. Pt with long history of medication controlled paranoid schizophrenia, now due to inability to access previous med regimen, pt now presenting with paranoia and auditory hallucinations, in need of revamped med regimen in order to stabilize current psychotic symptomatology.     - Continue Effexor 150mg daily  - Started on Haldol, but changed to Risperdal 2mg qHS for psychotic features. Risperdal increased to 3mg qHS on 12/12.       PRN Medication(s):  - Haldol 5mg PO/IM q6hr PRN for non-redirectable agitation  - Ativan 2mg PO/IM q4hr PRN for non-redirectable agitation  - Benadryl 25mg TID PRN for breakthrough anxiety  - Benadryl 50mg qHS for insomnia, per patient request

## 2018-12-13 NOTE — PROGRESS NOTES
Spoke with daughter and she does feel patient is doing a little bit better. Daughter does plan to come see her this weekend and will fill out a feedback form. Daughter said the friend that came to visit patient last night said patient was progressing. Daughter is informed about the medication change and progress the treatment team has noticed. Daughter will be happy to come in for family meeting next week.
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
## 2018-12-13 NOTE — SUBJECTIVE & OBJECTIVE
Interval History: See hospital course    Family History     Problem Relation (Age of Onset)    Arthritis Brother    Cancer Mother, Father    Colon cancer Mother    Depression Mother    Diabetes Brother    Heart disease Brother    No Known Problems Daughter    Psoriasis Mother    Stroke Sister        Tobacco Use    Smoking status: Former Smoker     Packs/day: 0.50     Years: 10.00     Pack years: 5.00     Types: Cigarettes     Last attempt to quit: 2012     Years since quittin.5    Smokeless tobacco: Former User    Tobacco comment: stopped smoking 2017.     Substance and Sexual Activity    Alcohol use: No    Drug use: No    Sexual activity: No     Partners: Male     Birth control/protection: Post-menopausal     Psychotherapeutics (From admission, onward)    Start     Stop Route Frequency Ordered    18 2100  risperiDONE tablet 3 mg      -- Oral Nightly 18 1002    18 1130  venlafaxine 24 hr capsule 150 mg      -- Oral Daily 18 1117    12/10/18 2310  haloperidol tablet 5 mg  (Med - Acute  Behavioral Management)      -- Oral Every 4 hours PRN 12/10/18 2310    12/10/18 2310  LORazepam tablet 2 mg  (Med - Acute  Behavioral Management)      -- Oral Every 4 hours PRN 12/10/18 2310    12/10/18 2310  haloperidol lactate injection 5 mg  (Med - Acute  Behavioral Management)      -- IM Every 4 hours PRN 12/10/18 2310    12/10/18 2310  lorazepam (ATIVAN) injection 2 mg  (Med - Acute  Behavioral Management)      -- IM Every 4 hours PRN 12/10/18 2310           Review of Systems   Constitutional: Negative for chills and fever.   Respiratory: Negative for cough and shortness of breath.    Cardiovascular: Negative for chest pain.   Gastrointestinal: Positive for constipation. Negative for abdominal pain, diarrhea, nausea and vomiting.   Neurological: Negative for headaches.   Psychiatric/Behavioral: Positive for sleep disturbance. Negative for behavioral problems, dysphoric mood, hallucinations  "and suicidal ideas. The patient is not nervous/anxious.      Objective:     Vital Signs (Most Recent):  Temp: 97.8 °F (36.6 °C) (12/13/18 0735)  Pulse: 108 (12/13/18 0735)  Resp: 18 (12/13/18 0735)  BP: (!) 144/82 (12/13/18 0735) Vital Signs (24h Range):  Temp:  [97.8 °F (36.6 °C)-98 °F (36.7 °C)] 97.8 °F (36.6 °C)  Pulse:  [] 108  Resp:  [16-18] 18  BP: (144-192)/(81-82) 144/82     Height: 5' 4" (162.6 cm)  Weight: 44.5 kg (98 lb 1.7 oz)  Body mass index is 16.84 kg/m².    No intake or output data in the 24 hours ending 12/13/18 0950    Physical Exam   Musculoskeletal:   AIMS (12/12): 0   Psychiatric:   Mental Status Exam:  Arousal: alert  Sensorium/Orientation: grossly intact  Behavior/Cooperation: normal, friendly and cooperative   Psychomotor: unremarkable and within normal limits  Speech: conversational rate, tone, and volume  Language: answered questions appropriately  Mood: "good"   Affect: Appropriate and reactive to content  Thought Process: linear, logical  Thought Content:   Auditory hallucinations: NO  Visual hallucinations: NO  Paranoia: NO  Delusions:  NO  Suicidal ideation: NO  Homicidal ideation: NO  Attention/Concentration:  intact  able to focus  Memory: grossly intact     Insight: Intact  Judgment:Intact           Significant Labs: Labs:    No results found for: LITHIUM, PHENYTOIN, PHENOBARB, VALPROATE, CBMZ    CBC:   Recent Labs   Lab 12/11/18  0940   WBC 8.69   RBC 5.11   HGB 15.3   HCT 44.8      MCV 88   MCH 29.9   MCHC 34.2     CMP:   Recent Labs   Lab 12/11/18  0940   *   CALCIUM 10.6*   ALBUMIN 3.6   PROT 6.9   *   K 3.8   CO2 25   CL 96   BUN 7*   CREATININE 1.0   ALKPHOS 93   ALT 25   AST 36   BILITOT 0.4     Labs within the past 24 hours have been reviewed.    Significant Imaging: None  "

## 2018-12-13 NOTE — PROGRESS NOTES
[facility]  Psychiatry  Progress Note    Patient Name: Bhavna Landry  MRN: [id number]   Code Status: Full Code  Admission Date: 12/10/2018  Hospital Length of Stay: 3 days  Expected Discharge Date:   Attending Physician: Amador Arita MD  Primary Care Provider: Sadaf Vargas MD    Current Legal Status: [facility] [place] [facility]    Patient information was obtained from patient and EHR.     Subjective:     Principal Problem:Schizophrenia, chronic condition with acute exacerbation    Chief Complaint: AH    HPI: Bhavna Landry is a 63 y.o. female with a past psychiatric history of Paranoid Schizophrenia, currently presenting with AH.  Psychiatry was originally consulted to address the patient's symptoms of auditory hallucinations.     Per ED RN(s):  Pt ran out of her nevane 2 weeks ago for schizophrenia . Pt psychiatrist changed her med to abilify and symptoms worsened. Pt was recently changed to zyprexa for past 3 days with no improvement. Pt talking to herself and having hallucinations.      Per ED MD:  Patient is a 63 year old female with a pmhx of schizophrenia who presents to the ED for psychiatric evaluation. Patient recently d/c Navane which she has been on for over a decade because it was on back order. Her pscyhiatrist presribed her new medication, but patient and her family feels as if she is declining. Over the weekend, patient has been going back in forth in the house, slamming doors, talking to herself. She however denies any auditory or visual hallucinations, suicidal ideations, homicidal ideations. She presents to the ED with her daughter who she stays with  and who is concerned. Patient has a chronic cough, unchanged from baseline. No fever or chills. Smokes 10 cigs per day.     Per Psych MD:  Upon initiation of interview, pt was seated next to ED bed, dressed in casual clothing, in no apparent distress, daughter at bedside. Pt reports a long history of paranoid schizophrenia controlled on Navane,  "however, recently has been unable to fill said prescription, being told that said medication is no longer in production. As of late, pt has been "talking outloud," stating "don't do anything to my daughter, I dont want to hurt anybody, not my granddaughter, I wanna protect her from old people bc I dont want her to get gray hair." Additionally, pt states that she has been agitated due to feeling like "my head and body are being pulled apart." Pt follows with Dr. Poe, who has been utilizing different medications in an attempt to stabilize patient, on an outpt basis, having tried Abilify and Zyprexa, to no avail. Pt denies any other psychiatric symptomatology for depression (SIGECAPS), padmini (DIGFAST), suicidality or homicidality.     Psychiatric Review Of Systems - Is patient experiencing or having changes in:  sleep: no  appetite: no  weight: no  energy/anergy: no  interest/pleasure/anhedonia: no  somatic symptoms: no  guilty/hopelessness: no  concentration: no  S.I.B.s/risky behavior: no  SI/SA:  no     anxiety/panic: yes  Agoraphobia:  no  Social phobia:  no  Recurrent nightmares:  no  hyper startle response:  no  Avoidance: no  Recurrent thoughts:  no  Recurrent behaviors:  no     Irritability: yes  Racing thoughts: no  Impulsive behaviors: no  Pressured speech:  no     Paranoia:yes  Delusions: yes  AVH:yes     Medical Review Of Systems:  Pertinent items noted in HPI     Psychiatric History:  Diagnose(s): Yes - Paranoid Schizophrenia  Previous Medication Trials: Yes - Serenity Thorpelify, Zyprexa  Previous Psychiatric Hospitalizations: Yes - 4x (last 2000)  Previous Suicide Attempts: No  History of Violence: No  Outpatient Psychiatrist: Yes - Carol Ann Poe MD     Social History:  Marital Status:   Children: 1   Employment Status: retired  Education: high school diploma/GED  Special Ed: no   History: no  Housing Status: Yes - lives with daughter and grandchildren  Developmental History: No  History " of Abuse: No  Access to Gun: Yes - but it is unloaded     Substance Abuse History:  Recreational Drugs: no  Use of Alcohol: no  Rehab History: No  Tobacco Use: Yes - 10 per day  Use of Caffeine: Yes -  5 to 6 cups per day  Use of OTC: Yes - Benadryl  Legal consequences of chemical use: No  Is the patient aware of the biomedical complications associated with substance abuse and mental illness? No     Legal History:  Past Charges/Incarcerations: No  Pending Charges: No     Family Psychiatric History:   No     Collateral:   Yes - daughter at bedside, who brought pt to ED. She is very concerned, due to her mother being well controlled for years on Navane, and now with inability to access said medication, daughter worried for mother's safety as she has begun to hallucinate, regularly, as well as present agitated on multiple occasions      Psychosocial Stressors: health.   Functioning Relationships: good support system     Psychosocial Factors:  Maladaptive or problem behaviors: No  Peer group, social, ethic, cultural, emotional, and health factors: No  Living situation, family constellation, family circumstances/home: Yes - good support system  Recovery environment: Yes -   Community resources used by patient: No  Treatment acceptance/motivation for change: Yes -    Hospital Course: 12/11/2018  Seen for initial evaluation with treatment team. Patient with long history of schizophrenia treated with Navane (also depression on Effexor XR) who was in their usual state of health until she could not access the Navane due to a shortage at the pharmacy.  Off her neuroleptic she quickly decompensated.  Outpatient pscyhiatrist, Dr. Poe (who I spoke with today to discuss the case), attempted to restabilize her on an outpatient basis but she failed brief trials of Abilify and Zyprexa (only a few days in duration each).  She was initiated on Haldol last night but I spoke with Dr. Poe and will first initiate a Risperdal trial  "prior to moving to another typical neuroleptic.  Collateral obtained from daughter.    12/12/2018  Staff reports poor sleep (maybe 3 hours overnight) and that patient was changing clothes frequently. Staff denies patient with delusional content, but concerned for hypomania and intrusiveness. Patient seen and examined with treatment team; chart and nursing notes reviewed. No distress noted, and patient was agreeable and cooperative with interview. Patient states they are feeling "alright" this morning. Patient reports poor sleep sleep overnight, but feels it may be attributed to being a strange environment. Has a stable appetite. No somatic complaints today. Tolerating medications without adverse side effects. Denies SI, HI, AVH, delusions, or paranoia. Patient has been compliant with medications.    12/13/2018  Patient under good behavioral control. Bizarre affect at times, sometimes seen staring into the the nurses station. Patient denies any pain or discomfort. Guarded, but can be pleasant and interacts appropriately with staff and peers. Did not displayed any signs of padmini and did not expressed any delusional thoughts to staff yesterday. Attending groups, taking medications, and denies SI/HI/AV hallucinations. Reports improved sleep, and states she got ~5 hours overnight. Stable appetite. Denies any somatic complaints, but does express some concern for constipation. Patient does request medication to help with sleep and "nerves;" states Benadryl was beneficial in the past.    No new subjective & objective note has been filed under this hospital service since the last note was generated.    Assessment/Plan:     * Schizophrenia, chronic condition with acute exacerbation      Bhavna Landry is a 63 y.o. female with a past psychiatric history of Paranoid Schizophrenia, who presented with .  Psychiatry was originally consulted to address the patient's symptoms of auditory hallucinations. Pt with long history of " medication controlled paranoid schizophrenia, now due to inability to access previous med regimen, pt now presenting with paranoia and auditory hallucinations, in need of revamped med regimen in order to stabilize current psychotic symptomatology.     - Continue Effexor 150mg daily  - Started on Haldol, but changed to Risperdal 2mg qHS for psychotic features. Risperdal increased to 3mg qHS on 12/12.       PRN Medication(s):  - Haldol 5mg PO/IM q6hr PRN for non-redirectable agitation  - Ativan 2mg PO/IM q4hr PRN for non-redirectable agitation  - Benadryl 25mg TID PRN for breakthrough anxiety  - Benadryl 50mg qHS for insomnia, per patient request        Tobacco use disorder    - PRN Nicotine patch      Migraine without aura and without status migrainosus, not intractable    - Zanaflex 4mg Q8 PRN for headache  - Tylenol 650 mg PRN Q6 for headache     Hyperlipidemia    - Continue home Atorvastatin 40 mg daily  -  mg daily     Essential hypertension    - Norvasc 10mg daily  - Continue to monitor     Osteoarthritis    - Tylenol 650 mg Q6 PRN as needed for pain          Need for Continued Hospitalization:   Psychiatric illness continues to pose a potential threat to life or bodily function, of self or others, thereby requiring the need for continued inpatient psychiatric hospitalization. and Requires ongoing hospitalization for stabilization of medications.    Anticipated Disposition: Still a Patient     Total time:  15 minutes with greater than 50% of this time spent in counseling and/or coordination of care.     Zak Cosby MD  [facility]  PGY-4  12/13/2018

## 2018-12-13 NOTE — PROGRESS NOTES
12/12/18 2000   [facility] Group Therapy   Group Name Community Reintegration   Specific Interventions Current Events   Participation Level Active;Appropriate   Participation Quality Cooperative   Insight/Motivation Good   Affect/Mood Display Appropriate   Cognition Alert;Oriented

## 2018-12-13 NOTE — PROGRESS NOTES
Group Psychotherapy (PhD/LCSW)    Site: [facility]    Clinical status of patient: Inpatient    Date: 12/13/2018    Group Focus: Life Skills    Length of service: 64105 - 35-40 minutes    Number of patients in attendance: 10    Referred by: Acute Psychiatry Unit Treatment Team    Target symptoms: Psychosis    Patient's response to treatment: Active Listening, Self-disclosure     Progress toward goals: Progressing slowly    Interval History:. Pt appeared alert and attentive in group. Pt participated appropriately in a group discussion of strategies for mitigating anxiety. .       Diagnosis: Schizophrenia     Plan: Continue treatment on [facility]

## 2018-12-13 NOTE — PSYCH
The patient has been sleeping for approximately 3 hours. She appears restless and wakes frequently. Will continue to monitor.

## 2018-12-14 ENCOUNTER — TELEPHONE (OUTPATIENT)
Dept: INTERNAL MEDICINE | Facility: CLINIC | Age: 63
End: 2018-12-14

## 2018-12-14 PROBLEM — G25.81 RESTLESS LEGS: Status: ACTIVE | Noted: 2018-12-14

## 2018-12-14 PROCEDURE — 25000003 PHARM REV CODE 250: Performed by: STUDENT IN AN ORGANIZED HEALTH CARE EDUCATION/TRAINING PROGRAM

## 2018-12-14 PROCEDURE — 25000003 PHARM REV CODE 250: Performed by: PSYCHIATRY & NEUROLOGY

## 2018-12-14 PROCEDURE — 99233 SBSQ HOSP IP/OBS HIGH 50: CPT | Mod: ,,, | Performed by: PSYCHIATRY & NEUROLOGY

## 2018-12-14 PROCEDURE — 12400001 HC PSYCH SEMI-PRIVATE ROOM

## 2018-12-14 RX ORDER — LISINOPRIL 10 MG/1
10 TABLET ORAL DAILY
Status: DISCONTINUED | OUTPATIENT
Start: 2018-12-14 | End: 2018-12-18 | Stop reason: HOSPADM

## 2018-12-14 RX ORDER — LISINOPRIL 10 MG/1
10 TABLET ORAL DAILY
Status: DISCONTINUED | OUTPATIENT
Start: 2018-12-14 | End: 2018-12-14

## 2018-12-14 RX ORDER — GABAPENTIN 300 MG/1
600 CAPSULE ORAL NIGHTLY
Status: DISCONTINUED | OUTPATIENT
Start: 2018-12-14 | End: 2018-12-18 | Stop reason: HOSPADM

## 2018-12-14 RX ADMIN — GABAPENTIN 600 MG: 300 CAPSULE ORAL at 08:12

## 2018-12-14 RX ADMIN — LISINOPRIL 10 MG: 10 TABLET ORAL at 09:12

## 2018-12-14 RX ADMIN — BACITRACIN: 500 OINTMENT TOPICAL at 09:12

## 2018-12-14 RX ADMIN — ATORVASTATIN CALCIUM 40 MG: 20 TABLET, FILM COATED ORAL at 09:12

## 2018-12-14 RX ADMIN — RISPERIDONE 3 MG: 1 TABLET ORAL at 08:12

## 2018-12-14 RX ADMIN — ASPIRIN 325 MG ORAL TABLET 325 MG: 325 PILL ORAL at 09:12

## 2018-12-14 RX ADMIN — THERA TABS 1 TABLET: TAB at 09:12

## 2018-12-14 RX ADMIN — DIPHENHYDRAMINE HYDROCHLORIDE 50 MG: 50 CAPSULE ORAL at 08:12

## 2018-12-14 RX ADMIN — VENLAFAXINE HYDROCHLORIDE 150 MG: 150 CAPSULE, EXTENDED RELEASE ORAL at 09:12

## 2018-12-14 RX ADMIN — AMLODIPINE BESYLATE 10 MG: 10 TABLET ORAL at 09:12

## 2018-12-14 RX ADMIN — FOLIC ACID 1 MG: 1 TABLET ORAL at 09:12

## 2018-12-14 NOTE — PLAN OF CARE
12/13/18 2000   [facility] Group Therapy   Group Name Community Reintegration   Specific Interventions Social Skills Training   Participation Level Active   Participation Quality Cooperative   Insight/Motivation Good   Affect/Mood Display Appropriate   Cognition Alert

## 2018-12-14 NOTE — SUBJECTIVE & OBJECTIVE
Interval History: See hospital course    Family History     Problem Relation (Age of Onset)    Arthritis Brother    Cancer Mother, Father    Colon cancer Mother    Depression Mother    Diabetes Brother    Heart disease Brother    No Known Problems Daughter    Psoriasis Mother    Stroke Sister        Tobacco Use    Smoking status: Former Smoker     Packs/day: 0.50     Years: 10.00     Pack years: 5.00     Types: Cigarettes     Last attempt to quit: 2012     Years since quittin.5    Smokeless tobacco: Former User    Tobacco comment: stopped smoking 2017.     Substance and Sexual Activity    Alcohol use: No    Drug use: No    Sexual activity: No     Partners: Male     Birth control/protection: Post-menopausal     Psychotherapeutics (From admission, onward)    Start     Stop Route Frequency Ordered    18 2100  risperiDONE tablet 3 mg      -- Oral Nightly 18 1002    18 1130  venlafaxine 24 hr capsule 150 mg      -- Oral Daily 18 1117    12/10/18 2310  haloperidol tablet 5 mg  (Med - Acute  Behavioral Management)      -- Oral Every 4 hours PRN 12/10/18 2310    12/10/18 2310  LORazepam tablet 2 mg  (Med - Acute  Behavioral Management)      -- Oral Every 4 hours PRN 12/10/18 2310    12/10/18 2310  haloperidol lactate injection 5 mg  (Med - Acute  Behavioral Management)      -- IM Every 4 hours PRN 12/10/18 2310    12/10/18 2310  lorazepam (ATIVAN) injection 2 mg  (Med - Acute  Behavioral Management)      -- IM Every 4 hours PRN 12/10/18 2310           Review of Systems   Constitutional: Negative for chills and fever.   Respiratory: Negative for cough and shortness of breath.    Cardiovascular: Negative for chest pain.   Gastrointestinal: Negative for abdominal pain, constipation, diarrhea, nausea and vomiting.   Musculoskeletal: Positive for myalgias.   Neurological: Negative for headaches.   Psychiatric/Behavioral: Positive for sleep disturbance. Negative for behavioral problems,  "dysphoric mood, hallucinations and suicidal ideas. The patient is not nervous/anxious.      Objective:     Vital Signs (Most Recent):  Temp: 97.9 °F (36.6 °C) (12/13/18 1954)  Pulse: 97 (12/14/18 0725)  Resp: 20 (12/14/18 0725)  BP: (!) 149/97 (12/14/18 0725) Vital Signs (24h Range):  Temp:  [97.9 °F (36.6 °C)] 97.9 °F (36.6 °C)  Pulse:  [] 97  Resp:  [16-20] 20  BP: (149-177)/(84-97) 149/97     Height: 5' 4" (162.6 cm)  Weight: 44.5 kg (98 lb 1.7 oz)  Body mass index is 16.84 kg/m².    No intake or output data in the 24 hours ending 12/14/18 0753    Physical Exam   Musculoskeletal:   AIMS (12/12): 0   Psychiatric:   Mental Status Exam:  Arousal: alert  Sensorium/Orientation: grossly intact  Behavior/Cooperation: normal, friendly and cooperative   Psychomotor: unremarkable and within normal limits  Speech: conversational rate, tone, and volume  Language: answered questions appropriately  Mood: "better"   Affect: Appropriate and reactive to content  Thought Process: linear, logical  Thought Content:   Auditory hallucinations: NO  Visual hallucinations: NO  Paranoia: NO  Delusions:  NO  Suicidal ideation: NO  Homicidal ideation: NO  Attention/Concentration:  intact  able to focus  Memory: grossly intact     Insight: Intact  Judgment:Intact           Significant Labs: Labs:    No results found for: LITHIUM, PHENYTOIN, PHENOBARB, VALPROATE, CBMZ    CBC:   Recent Labs   Lab 12/11/18  0940   WBC 8.69   RBC 5.11   HGB 15.3   HCT 44.8      MCV 88   MCH 29.9   MCHC 34.2     CMP:   Recent Labs   Lab 12/11/18  0940   *   CALCIUM 10.6*   ALBUMIN 3.6   PROT 6.9   *   K 3.8   CO2 25   CL 96   BUN 7*   CREATININE 1.0   ALKPHOS 93   ALT 25   AST 36   BILITOT 0.4     Labs within the past 24 hours have been reviewed.    Significant Imaging: None  "

## 2018-12-14 NOTE — PROGRESS NOTES
[facility]  Psychiatry  Progress Note    Patient Name: Bhavna Landry  MRN: [id number]   Code Status: Full Code  Admission Date: 12/10/2018  Hospital Length of Stay: 4 days  Expected Discharge Date:   Attending Physician: Amador Arita MD  Primary Care Provider: Sadaf Vargas MD    [place] [facility] [place]    Patient information was obtained from patient and EHR.     Subjective:     Principal Problem:Schizophrenia, chronic condition with acute exacerbation    Chief Complaint: AH    HPI: Bhavna Landry is a 63 y.o. female with a past psychiatric history of Paranoid Schizophrenia, currently presenting with AH.  Psychiatry was originally consulted to address the patient's symptoms of auditory hallucinations.     Per ED RN(s):  Pt ran out of her nevane 2 weeks ago for schizophrenia . Pt psychiatrist changed her med to abilify and symptoms worsened. Pt was recently changed to zyprexa for past 3 days with no improvement. Pt talking to herself and having hallucinations.      Per ED MD:  Patient is a 63 year old female with a pmhx of schizophrenia who presents to the ED for psychiatric evaluation. Patient recently d/c Navane which she has been on for over a decade because it was on back order. Her pscyhiatrist presribed her new medication, but patient and her family feels as if she is declining. Over the weekend, patient has been going back in forth in the house, slamming doors, talking to herself. She however denies any auditory or visual hallucinations, suicidal ideations, homicidal ideations. She presents to the ED with her daughter who she stays with  and who is concerned. Patient has a chronic cough, unchanged from baseline. No fever or chills. Smokes 10 cigs per day.     Per Psych MD:  Upon initiation of interview, pt was seated next to ED bed, dressed in casual clothing, in no apparent distress, daughter at bedside. Pt reports a long history of paranoid schizophrenia controlled on Navane,  "however, recently has been unable to fill said prescription, being told that said medication is no longer in production. As of late, pt has been "talking outloud," stating "don't do anything to my daughter, I dont want to hurt anybody, not my granddaughter, I wanna protect her from old people bc I dont want her to get gray hair." Additionally, pt states that she has been agitated due to feeling like "my head and body are being pulled apart." Pt follows with Dr. Poe, who has been utilizing different medications in an attempt to stabilize patient, on an outpt basis, having tried Abilify and Zyprexa, to no avail. Pt denies any other psychiatric symptomatology for depression (SIGECAPS), padmini (DIGFAST), suicidality or homicidality.     Psychiatric Review Of Systems - Is patient experiencing or having changes in:  sleep: no  appetite: no  weight: no  energy/anergy: no  interest/pleasure/anhedonia: no  somatic symptoms: no  guilty/hopelessness: no  concentration: no  S.I.B.s/risky behavior: no  SI/SA:  no     anxiety/panic: yes  Agoraphobia:  no  Social phobia:  no  Recurrent nightmares:  no  hyper startle response:  no  Avoidance: no  Recurrent thoughts:  no  Recurrent behaviors:  no     Irritability: yes  Racing thoughts: no  Impulsive behaviors: no  Pressured speech:  no     Paranoia:yes  Delusions: yes  AVH:yes     Medical Review Of Systems:  Pertinent items noted in HPI     Psychiatric History:  Diagnose(s): Yes - Paranoid Schizophrenia  Previous Medication Trials: Yes - Serenity Thorpelify, Zyprexa  Previous Psychiatric Hospitalizations: Yes - 4x (last 2000)  Previous Suicide Attempts: No  History of Violence: No  Outpatient Psychiatrist: Yes - Carol Ann Poe MD     Social History:  Marital Status:   Children: 1   Employment Status: retired  Education: high school diploma/GED  Special Ed: no   History: no  Housing Status: Yes - lives with daughter and grandchildren  Developmental History: No  History " of Abuse: No  Access to Gun: Yes - but it is unloaded     Substance Abuse History:  Recreational Drugs: no  Use of Alcohol: no  Rehab History: No  Tobacco Use: Yes - 10 per day  Use of Caffeine: Yes -  5 to 6 cups per day  Use of OTC: Yes - Benadryl  Legal consequences of chemical use: No  Is the patient aware of the biomedical complications associated with substance abuse and mental illness? No     Legal History:  Past Charges/Incarcerations: No  Pending Charges: No     Family Psychiatric History:   No     Collateral:   Yes - daughter at bedside, who brought pt to ED. She is very concerned, due to her mother being well controlled for years on Navane, and now with inability to access said medication, daughter worried for mother's safety as she has begun to hallucinate, regularly, as well as present agitated on multiple occasions      Psychosocial Stressors: health.   Functioning Relationships: good support system     Psychosocial Factors:  Maladaptive or problem behaviors: No  Peer group, social, ethic, cultural, emotional, and health factors: No  Living situation, family constellation, family circumstances/home: Yes - good support system  Recovery environment: Yes -   Community resources used by patient: No  Treatment acceptance/motivation for change: Yes -    Hospital Course: 12/11/2018  Seen for initial evaluation with treatment team. Patient with long history of schizophrenia treated with Navane (also depression on Effexor XR) who was in their usual state of health until she could not access the Navane due to a shortage at the pharmacy.  Off her neuroleptic she quickly decompensated.  Outpatient pscyhiatrist, Dr. Poe (who I spoke with today to discuss the case), attempted to restabilize her on an outpatient basis but she failed brief trials of Abilify and Zyprexa (only a few days in duration each).  She was initiated on Haldol last night but I spoke with Dr. Poe and will first initiate a Risperdal trial  "prior to moving to another typical neuroleptic.  Collateral obtained from daughter.    12/12/2018  Staff reports poor sleep (maybe 3 hours overnight) and that patient was changing clothes frequently. Staff denies patient with delusional content, but concerned for hypomania and intrusiveness. Patient seen and examined with treatment team; chart and nursing notes reviewed. No distress noted, and patient was agreeable and cooperative with interview. Patient states they are feeling "alright" this morning. Patient reports poor sleep sleep overnight, but feels it may be attributed to being a strange environment. Has a stable appetite. No somatic complaints today. Tolerating medications without adverse side effects. Denies SI, HI, AVH, delusions, or paranoia. Patient has been compliant with medications.    12/13/2018  Patient under good behavioral control. Bizarre affect at times, sometimes seen staring into the the nurses station. Patient denies any pain or discomfort. Guarded, but can be pleasant and interacts appropriately with staff and peers. Did not displayed any signs of padmini and did not expressed any delusional thoughts to staff yesterday. Attending groups, taking medications, and denies SI/HI/AV hallucinations. Reports improved sleep, and states she got ~5 hours overnight. Stable appetite. Denies any somatic complaints, but does express some concern for constipation. Patient does request medication to help with sleep and "nerves;" states Benadryl was beneficial in the past.    12/14/2018  Patient has not been a management problem. The patient is medication compliant; no PRN medications administered. Patient endorsed feeling anxious last night with poor sleep related to her anxiety. Staff reports that patient appeared guarded and more withdrawn in the evening hour; did not engage with her peers. No manic or disorganized behavior displayed. Her thought content remains appropriate. Daughter plans on coming to visit " "this weekend and is agreeable to family meeting next week. With treatment team, patient reports doing "better" today. Reports she routinely sleeps sitting up, but alternates with lying down. States she is feeling more like herself today and thinking clearly. Only complaint is Left leg cramps during the day and restless legs at night.    Interval History: See hospital course    Family History     Problem Relation (Age of Onset)    Arthritis Brother    Cancer Mother, Father    Colon cancer Mother    Depression Mother    Diabetes Brother    Heart disease Brother    No Known Problems Daughter    Psoriasis Mother    Stroke Sister        Tobacco Use    Smoking status: Former Smoker     Packs/day: 0.50     Years: 10.00     Pack years: 5.00     Types: Cigarettes     Last attempt to quit: 2012     Years since quittin.5    Smokeless tobacco: Former User    Tobacco comment: stopped smoking 2017.     Substance and Sexual Activity    Alcohol use: No    Drug use: No    Sexual activity: No     Partners: Male     Birth control/protection: Post-menopausal     Psychotherapeutics (From admission, onward)    Start     Stop Route Frequency Ordered    18 2100  risperiDONE tablet 3 mg      -- Oral Nightly 18 1002    18 1130  venlafaxine 24 hr capsule 150 mg      -- Oral Daily 18 1117    12/10/18 2310  haloperidol tablet 5 mg  (Med - Acute  Behavioral Management)      -- Oral Every 4 hours PRN 12/10/18 2310    12/10/18 2310  LORazepam tablet 2 mg  (Med - Acute  Behavioral Management)      -- Oral Every 4 hours PRN 12/10/18 2310    12/10/18 2310  haloperidol lactate injection 5 mg  (Med - Acute  Behavioral Management)      -- IM Every 4 hours PRN 12/10/18 2310    12/10/18 2310  lorazepam (ATIVAN) injection 2 mg  (Med - Acute  Behavioral Management)      -- IM Every 4 hours PRN 12/10/18 2310           Review of Systems   Constitutional: Negative for chills and fever.   Respiratory: Negative for cough " "and shortness of breath.    Cardiovascular: Negative for chest pain.   Gastrointestinal: Negative for abdominal pain, constipation, diarrhea, nausea and vomiting.   Musculoskeletal: Positive for myalgias.   Neurological: Negative for headaches.   Psychiatric/Behavioral: Positive for sleep disturbance. Negative for behavioral problems, dysphoric mood, hallucinations and suicidal ideas. The patient is not nervous/anxious.      Objective:     Vital Signs (Most Recent):  Temp: 97.9 °F (36.6 °C) (12/13/18 1954)  Pulse: 97 (12/14/18 0725)  Resp: 20 (12/14/18 0725)  BP: (!) 149/97 (12/14/18 0725) Vital Signs (24h Range):  Temp:  [97.9 °F (36.6 °C)] 97.9 °F (36.6 °C)  Pulse:  [] 97  Resp:  [16-20] 20  BP: (149-177)/(84-97) 149/97     Height: 5' 4" (162.6 cm)  Weight: 44.5 kg (98 lb 1.7 oz)  Body mass index is 16.84 kg/m².    No intake or output data in the 24 hours ending 12/14/18 0753    Physical Exam   Musculoskeletal:   AIMS (12/12): 0   Psychiatric:   Mental Status Exam:  Arousal: alert  Sensorium/Orientation: grossly intact  Behavior/Cooperation: normal, friendly and cooperative   Psychomotor: unremarkable and within normal limits  Speech: conversational rate, tone, and volume  Language: answered questions appropriately  Mood: "better"   Affect: Appropriate and reactive to content  Thought Process: linear, logical  Thought Content:   Auditory hallucinations: NO  Visual hallucinations: NO  Paranoia: NO  Delusions:  NO  Suicidal ideation: NO  Homicidal ideation: NO  Attention/Concentration:  intact  able to focus  Memory: grossly intact     Insight: Intact  Judgment:Intact           Significant Labs: Labs:    No results found for: LITHIUM, PHENYTOIN, PHENOBARB, VALPROATE, CBMZ    CBC:   Recent Labs   Lab 12/11/18  0940   WBC 8.69   RBC 5.11   HGB 15.3   HCT 44.8      MCV 88   MCH 29.9   MCHC 34.2     CMP:   Recent Labs   Lab 12/11/18  0940   *   CALCIUM 10.6*   ALBUMIN 3.6   PROT 6.9   *   K 3.8 "   CO2 25   CL 96   BUN 7*   CREATININE 1.0   ALKPHOS 93   ALT 25   AST 36   BILITOT 0.4     Labs within the past 24 hours have been reviewed.    Significant Imaging: None    Assessment/Plan:     * Schizophrenia, chronic condition with acute exacerbation      Bhavna Landry is a 63 y.o. female with a past psychiatric history of Paranoid Schizophrenia, who presented with AH.  Psychiatry was originally consulted to address the patient's symptoms of auditory hallucinations. Pt with long history of medication controlled paranoid schizophrenia, now due to inability to access previous med regimen, pt now presenting with paranoia and auditory hallucinations, in need of revamped med regimen in order to stabilize current psychotic symptomatology.     - Continue Effexor 150mg daily  - Started on Haldol, but changed to Risperdal 2mg qHS for psychotic features. Risperdal increased to 3mg qHS on 12/12.       PRN Medication(s):  - Haldol 5mg PO/IM q6hr PRN for non-redirectable agitation  - Ativan 2mg PO/IM q4hr PRN for non-redirectable agitation  - Benadryl 25mg TID PRN for breakthrough anxiety  - Benadryl 50mg qHS for insomnia, per patient request        Restless legs    Reports being prescribed medication from outpatient neurologist, but cannot recall medication name.  - Will start gabapentin 600mg qHS and attempt to uncover name of home medication     Tobacco use disorder    - PRN Nicotine patch      Migraine without aura and without status migrainosus, not intractable    - Zanaflex 4mg Q8 PRN for headache  - Tylenol 650 mg PRN Q6 for headache     Hyperlipidemia    - Continue home Atorvastatin 40 mg daily  -  mg daily     Essential hypertension    - Norvasc 10mg daily  - Lisinopril 10mg daily added 12/14  - Continue to monitor     Osteoarthritis    - Tylenol 650 mg Q6 PRN as needed for pain          Need for Continued Hospitalization:   Psychiatric illness continues to pose a potential threat to life or bodily function,  of self or others, thereby requiring the need for continued inpatient psychiatric hospitalization. and Requires ongoing hospitalization for stabilization of medications.    Anticipated Disposition: Still a Patient     Total time:  15 minutes with greater than 50% of this time spent in counseling and/or coordination of care.     Zak Cosby MD  [facility]  PGY-4  12/14/2018

## 2018-12-14 NOTE — ASSESSMENT & PLAN NOTE
- Zanaflex 4mg Q8 PRN for headache  - Tylenol 650 mg PRN Q6 for headache    Neurontin initiated 12/14/18, which should also help with headaches

## 2018-12-14 NOTE — TELEPHONE ENCOUNTER
----- Message from Maricel Muñoz sent at 12/14/2018  3:02 PM CST -----  Contact: [facility]/[phone]      ----- Message -----  From: Yenni Nagel  Sent: 12/14/2018   2:46 PM  To: Alicia CASTANEDA [facility] is requesting a call from [employer] in regards to Prior Authorization for patient.    They have questions concerning the PA.    Please advise, thank you

## 2018-12-14 NOTE — ASSESSMENT & PLAN NOTE
- Norvasc 10mg daily  - Lisinopril 10mg daily added 12/14/18  - Continue to monitor vitals on the unit

## 2018-12-14 NOTE — PLAN OF CARE
Problem: Adult Behavioral Health Plan of Care  Goal: Plan of Care Review  Outcome: Outcome(s) achieved Date Met: 12/14/18  Patient is visible on the unit.  No bizarre or psychotic behavior exhibited.  Thought processes are much improved.  Patient reports mood is also improved but still having problems with anxiety.  Medication compliant no side effects noted.  Appearance is appropriate.  Attending select groups and interacting with peers.   Goal: Adheres to Safety Considerations for Self and Others  Outcome: Ongoing (interventions implemented as appropriate)  Adheres to safety rules and routines.   Goal: Optimized Coping Skills in Response to Life Stressors  Outcome: Ongoing (interventions implemented as appropriate)  Verbalized understanding of coping skills  Goal: Develops/Participates in Therapeutic [place] to Support Successful Transition  Outcome: Ongoing (interventions implemented as appropriate)  Participates in the therapeutic alliance    Problem: Cognitive Impairment (Anxiety Signs/Symptoms)  Goal: Improved Cognitive Function (Anxiety Signs/Symptoms)  Outcome: Ongoing (interventions implemented as appropriate)  Patient thought processes are organized and goal directed.  She does have some anxiety especially at night.     Problem: Sleep Impairment (Anxiety Signs/Symptoms)  Goal: Improved Sleep Hygiene (Anxiety Signs/Symptoms)  Outcome: Ongoing (interventions implemented as appropriate)  Patient cont to have difficulty sleeping.  She does not lye down at night.  She sit up in the bed or on the sofat to sleep.    Problem: Gratification/Engagement Impairment (Psychotic Signs/Symptoms)  Goal: Increased Interaction and Expression (Psychotic Signs/Symptoms)  Outcome: Ongoing (interventions implemented as appropriate)  Patient able to engage in conversation without the interference of delusioins    Problem: Mental State/Mood Impairment (Psychotic Signs/Symptoms)  Goal: Improved Mental State and Mood (Psychotic  Signs/Symptoms)  Outcome: Ongoing (interventions implemented as appropriate)  Patient mood and mental state are improved.      Problem: Psychomotor Movement Impairment (Psychotic Signs/Symptoms)  Goal: Improved Psychomotor Symptoms (Psychotic Signs/Symptoms)  Outcome: Ongoing (interventions implemented as appropriate)  Patient does not appear psychotic at this time.  Denies audio/visual hallucination.      Problem: Cognitive Impairment (Manic/Hypomanic Signs/Symptoms)  Goal: Improved Cognitive Function (Manic/Hypomanic Signs/Symptoms)  Outcome: Ongoing (interventions implemented as appropriate)  Patient thought problems have improved.  She is organized and linear.

## 2018-12-14 NOTE — PROGRESS NOTES
12/14/18 1400   [facility] Group Therapy   Group Name Therapeutic Recreation   Specific Interventions Skilled Activity Crafts   Participation Level Active   Participation Quality Cooperative;Requires Prompting   Insight/Motivation Limited   Affect/Mood Display Appropriate;Restless   Cognition Alert

## 2018-12-14 NOTE — NURSING
Patients thought processes are organized and goal directed.  No delusional thought content noted.  Patient is compliant with medication.  No bizarre behavior noted.  Appetite is stable.  Attended some groups.  Appearance is appropriate.

## 2018-12-14 NOTE — ASSESSMENT & PLAN NOTE
Bhavna Landry is a 63 y.o. female with a past psychiatric history of Paranoid Schizophrenia, who presented with AH.  Psychiatry was originally consulted to address the patient's symptoms of auditory hallucinations. Pt with long history of medication controlled paranoid schizophrenia, now due to inability to access previous med regimen, pt now presenting with paranoia and auditory hallucinations, in need of revamped med regimen in order to stabilize current psychotic symptomatology.     - Continue Effexor 150mg daily  - Started on Haldol, but changed to Risperdal 2mg qHS for psychotic features. Risperdal increased to 3mg qHS on 12/12/18.     PRN Medication(s):  - Haldol 5mg PO/IM q6hr PRN for non-redirectable agitation  - Ativan 2mg PO/IM q4hr PRN for non-redirectable agitation  - Benadryl 25mg TID PRN for breakthrough anxiety  - Benadryl 50mg qHS for insomnia, per patient request    Patient under good behavioral control on unit, showing attenuation of psychosis but not yet back to baseline.

## 2018-12-14 NOTE — ASSESSMENT & PLAN NOTE
Reports being prescribed medication from outpatient neurologist, but cannot recall medication name.  - Will start gabapentin 600mg qHS and attempt to uncover name of home medication

## 2018-12-14 NOTE — PROGRESS NOTES
12/14/18 0900 12/14/18 1000 12/14/18 1100   [facility] Group Therapy   Group Name Community Reintegration Education (pet therapy)   Specific Interventions Current Events Guided Imagery/Relaxation Sensory Stimulation   Participation Level Active;Appropriate Active;Appropriate Active;Appropriate   Participation Quality Cooperative;Social Cooperative;Requires Prompting Cooperative   Insight/Motivation Good Limited Limited   Affect/Mood Display Appropriate;Restless Restless;Appropriate Appropriate;Restless   Cognition Alert Alert Alert

## 2018-12-14 NOTE — NURSING
Patient cont to make improvements.  Thought processes are organized and goal directed.  Patient is medication compliant.  She does endorse anxiety and problems sleeping.  Told treatment team she sleeps sitting up on a regular basis.  Also reports a problem with leg cramps.  Attends groups.  Appearance is appropriate.

## 2018-12-14 NOTE — TELEPHONE ENCOUNTER
"----- Message from Izzy Braun sent at 12/13/2018  4:12 PM CST -----      ----- Message -----  From: Elizabeth Canales  Sent: 12/13/2018   4:08 PM  To: Alicia CASTANEDA Staff    Prior Authorization Needed    Rx:     To submit the PA:    1: Go to " [email or web address] " and click "Enter a Key"    2. Enter the patient's last name and date of birth and the key.      KEY: [id number]    3. Complete the forms and click "send to Plan"    Note chart when prior authorization has been submitted.    Please notify pharmacy when prior authorization has been approved.    Thank You    "

## 2018-12-14 NOTE — PLAN OF CARE
12/14/18 1330   [facility] Group Therapy   Group Name Education   Specific Interventions Coping Skills Training   Participation Level Minimal;Appropriate;Sharing   Participation Quality Cooperative;Lack of Interest   Insight/Motivation Limited   Affect/Mood Display Unable to Assess   Cognition Alert

## 2018-12-14 NOTE — ASSESSMENT & PLAN NOTE
Reports being prescribed medication from outpatient neurologist, but cannot recall medication name.  - Will start gabapentin 600mg qHS, which should also help with sleep, anxiety, and headache

## 2018-12-15 PROCEDURE — 25000003 PHARM REV CODE 250: Performed by: PSYCHIATRY & NEUROLOGY

## 2018-12-15 PROCEDURE — 25000003 PHARM REV CODE 250: Performed by: STUDENT IN AN ORGANIZED HEALTH CARE EDUCATION/TRAINING PROGRAM

## 2018-12-15 PROCEDURE — 99232 SBSQ HOSP IP/OBS MODERATE 35: CPT | Mod: ,,, | Performed by: PSYCHIATRY & NEUROLOGY

## 2018-12-15 PROCEDURE — 12400001 HC PSYCH SEMI-PRIVATE ROOM

## 2018-12-15 RX ORDER — RISPERIDONE 1 MG/1
4 TABLET ORAL NIGHTLY
Status: DISCONTINUED | OUTPATIENT
Start: 2018-12-15 | End: 2018-12-16

## 2018-12-15 RX ADMIN — DIPHENHYDRAMINE HYDROCHLORIDE 50 MG: 50 CAPSULE ORAL at 08:12

## 2018-12-15 RX ADMIN — THERA TABS 1 TABLET: TAB at 08:12

## 2018-12-15 RX ADMIN — RISPERIDONE 4 MG: 1 TABLET ORAL at 08:12

## 2018-12-15 RX ADMIN — AMLODIPINE BESYLATE 10 MG: 10 TABLET ORAL at 08:12

## 2018-12-15 RX ADMIN — GABAPENTIN 600 MG: 300 CAPSULE ORAL at 08:12

## 2018-12-15 RX ADMIN — LISINOPRIL 10 MG: 10 TABLET ORAL at 08:12

## 2018-12-15 RX ADMIN — ATORVASTATIN CALCIUM 40 MG: 20 TABLET, FILM COATED ORAL at 08:12

## 2018-12-15 RX ADMIN — VENLAFAXINE HYDROCHLORIDE 150 MG: 150 CAPSULE, EXTENDED RELEASE ORAL at 08:12

## 2018-12-15 RX ADMIN — ASPIRIN 325 MG ORAL TABLET 325 MG: 325 PILL ORAL at 08:12

## 2018-12-15 RX ADMIN — FOLIC ACID 1 MG: 1 TABLET ORAL at 08:12

## 2018-12-15 RX ADMIN — ACETAMINOPHEN 650 MG: 325 TABLET ORAL at 12:12

## 2018-12-15 NOTE — PROGRESS NOTES
12/15/18 1000   [facility] Group Therapy   Group Name Therapeutic Recreation   Specific Interventions Cognitive Stimulation Training   Participation Level Active   Participation Quality Cooperative   Insight/Motivation Good   Affect/Mood Display Appropriate   Cognition Oriented

## 2018-12-15 NOTE — PLAN OF CARE
Pt displayed a flat affect. Remains guarded in conversation. Restless at times, pacing in hallway. Cooperative. Denies SI/HI. Denies AH/VH. Complained of headache and was given tylenol PO. Compliant with all medications. Attended unit activities. NAD observed. Will cont to monitor.

## 2018-12-15 NOTE — SUBJECTIVE & OBJECTIVE
Interval History: see hospital course    REVIEW OF SYSTEMS  Neuro: denies headache or tremor  GI: no N/V    MENTAL STATUS EXAM  General Appearance:  Stated age, casually dressed  Behavior: cooperative with interview  Speech: conversational, spontaneous  Mood: OK  Affect: low key but euthymic  Thought Process: linear, guarded  Thought Content: +PI.  Denies SI/HI  Memory: grossly intact  Attention: not distractible  Insight: impaired but improving  Judgment: impaired but improving        Family History     Problem Relation (Age of Onset)    Arthritis Brother    Cancer Mother, Father    Colon cancer Mother    Depression Mother    Diabetes Brother    Heart disease Brother    No Known Problems Daughter    Psoriasis Mother    Stroke Sister        Tobacco Use    Smoking status: Former Smoker     Packs/day: 0.50     Years: 10.00     Pack years: 5.00     Types: Cigarettes     Last attempt to quit: 2012     Years since quittin.5    Smokeless tobacco: Former User    Tobacco comment: stopped smoking 2017.     Substance and Sexual Activity    Alcohol use: No    Drug use: No    Sexual activity: No     Partners: Male     Birth control/protection: Post-menopausal     Psychotherapeutics (From admission, onward)    Start     Stop Route Frequency Ordered    12/15/18 2100  risperiDONE tablet 4 mg      -- Oral Nightly 12/15/18 0837    18 1130  venlafaxine 24 hr capsule 150 mg      -- Oral Daily 18 1117    12/10/18 2310  haloperidol tablet 5 mg  (Med - Acute  Behavioral Management)      -- Oral Every 4 hours PRN 12/10/18 2310    12/10/18 231  LORazepam tablet 2 mg  (Med - Acute  Behavioral Management)      -- Oral Every 4 hours PRN 12/10/18 2310    12/10/18 231  haloperidol lactate injection 5 mg  (Med - Acute  Behavioral Management)      -- IM Every 4 hours PRN 12/10/18 2310    12/10/18 2310  lorazepam (ATIVAN) injection 2 mg  (Med - Acute  Behavioral Management)      -- IM Every 4 hours PRN 12/10/18 2310     "       Review of Systems  Objective:     Vital Signs (Most Recent):  Temp: 97.4 °F (36.3 °C) (12/14/18 1920)  Pulse: 110 (12/14/18 1920)  Resp: 16 (12/14/18 1920)  BP: (!) 143/88 (12/14/18 1920) Vital Signs (24h Range):  Temp:  [97.4 °F (36.3 °C)] 97.4 °F (36.3 °C)  Pulse:  [110] 110  Resp:  [16] 16  BP: (143)/(88) 143/88     Height: 5' 4" (162.6 cm)  Weight: 44.5 kg (98 lb 1.7 oz)  Body mass index is 16.84 kg/m².    No intake or output data in the 24 hours ending 12/15/18 0838    Physical Exam     Significant Labs:   Last 24 Hours:   Recent Lab Results     None          Significant Imaging: None  "

## 2018-12-15 NOTE — PROGRESS NOTES
12/15/18 1100   [facility] Group Therapy   Group Name Therapeutic Recreation   Specific Interventions Guided Imagery/Relaxation   Participation Level Active   Participation Quality Cooperative   Insight/Motivation Good   Affect/Mood Display Appropriate   Cognition Oriented

## 2018-12-15 NOTE — PLAN OF CARE
12/15/18 1030   [facility] Group Therapy   Group Name Medication   Participation Level Appropriate;Attentive   Participation Quality Cooperative   Insight/Motivation Improved   Affect/Mood Display Appropriate   Cognition Alert

## 2018-12-15 NOTE — PROGRESS NOTES
12/15/18 0900   [facility] Group Therapy   Group Name Therapeutic Recreation   Specific Interventions Social Skills Training   Participation Level Active   Participation Quality Cooperative   Insight/Motivation Good   Affect/Mood Display Appropriate   Cognition Oriented

## 2018-12-15 NOTE — PLAN OF CARE
Problem: Adult Behavioral Health Plan of Care  Goal: Plan of Care Review  Outcome: Ongoing (interventions implemented as appropriate)  No management issues overnight. The patient is medication compliant at this time. No PRN medications administered. Patient endorsed feeling anxious this evening and poor sleep related to her anxiety. The patient appeared guarded this evening. She did not engage with her peers tonight. No manic or disorganized behavior displayed by the patient this evening. Her thought content remains appropriate. MVC maintained. Frequent safety rounds performed. Will continue to monitor.     Problem: Sleep Impairment (Anxiety Signs/Symptoms)  Goal: Improved Sleep Hygiene (Anxiety Signs/Symptoms)  Outcome: Ongoing (interventions implemented as appropriate)  The patient appears to be less restless this evening but continues to not be able to stay asleep in her bed. She was observed leaving her bed to sleep in the day-room. When sleeping in her bed, the patient does not lay down, instead she appears to sleep sitting up.      Problem: Mental State/Mood Impairment (Psychotic Signs/Symptoms)  Goal: Improved Mental State and Mood (Psychotic Signs/Symptoms)  Outcome: Ongoing (interventions implemented as appropriate)  The patient endorsed an anxious mood this evening. Per the patient she feels anxious at night time and this causes her to feel restless and experience difficulty sleeping. The patient's affect was congruent with her stated mood. She was guarded this evening on the unit.      Problem: Cognitive Impairment (Manic/Hypomanic Signs/Symptoms)  Goal: Improved Cognitive Function (Manic/Hypomanic Signs/Symptoms)  Outcome: Ongoing (interventions implemented as appropriate)  No manic behavior displayed overnight. The patient appeared less restless this evening as well. Her thought process and content appear linear and relevant.

## 2018-12-15 NOTE — PROGRESS NOTES
[facility]  Psychiatry  Progress Note    Patient Name: Bhavna Landry  MRN: [id number]   Code Status: Full Code  Admission Date: 12/10/2018  Hospital Length of Stay: 5 days  Expected Discharge Date:   Attending Physician: Amador Arita MD  Primary Care Provider: Sadaf Vargas MD    [place] [facility] [place]    Patient information was obtained from patient, relative(s), past medical records and ER records.     Subjective:     Principal Problem:Schizophrenia, chronic condition with acute exacerbation    Chief Complaint: psychosis    HPI: Bhavna Landry is a 63 y.o. female with a past psychiatric history of Paranoid Schizophrenia, currently presenting with .  Psychiatry was originally consulted to address the patient's symptoms of auditory hallucinations.     Per ED RN(s):  Pt ran out of her nevane 2 weeks ago for schizophrenia . Pt psychiatrist changed her med to abilify and symptoms worsened. Pt was recently changed to zyprexa for past 3 days with no improvement. Pt talking to herself and having hallucinations.      Per ED MD:  Patient is a 63 year old female with a pmhx of schizophrenia who presents to the ED for psychiatric evaluation. Patient recently d/c Navane which she has been on for over a decade because it was on back order. Her pscyhiatrist presribed her new medication, but patient and her family feels as if she is declining. Over the weekend, patient has been going back in forth in the house, slamming doors, talking to herself. She however denies any auditory or visual hallucinations, suicidal ideations, homicidal ideations. She presents to the ED with her daughter who she stays with  and who is concerned. Patient has a chronic cough, unchanged from baseline. No fever or chills. Smokes 10 cigs per day.     Per Psych MD:  Upon initiation of interview, pt was seated next to ED bed, dressed in casual clothing, in no apparent distress, daughter at bedside. Pt reports a long history of  "paranoid schizophrenia controlled on Navane, however, recently has been unable to fill said prescription, being told that said medication is no longer in production. As of late, pt has been "talking outloud," stating "don't do anything to my daughter, I dont want to hurt anybody, not my granddaughter, I wanna protect her from old people bc I dont want her to get gray hair." Additionally, pt states that she has been agitated due to feeling like "my head and body are being pulled apart." Pt follows with Dr. Poe, who has been utilizing different medications in an attempt to stabilize patient, on an outpt basis, having tried Abilify and Zyprexa, to no avail. Pt denies any other psychiatric symptomatology for depression (SIGECAPS), padmini (DIGFAST), suicidality or homicidality.     Psychiatric Review Of Systems - Is patient experiencing or having changes in:  sleep: no  appetite: no  weight: no  energy/anergy: no  interest/pleasure/anhedonia: no  somatic symptoms: no  guilty/hopelessness: no  concentration: no  S.I.B.s/risky behavior: no  SI/SA:  no     anxiety/panic: yes  Agoraphobia:  no  Social phobia:  no  Recurrent nightmares:  no  hyper startle response:  no  Avoidance: no  Recurrent thoughts:  no  Recurrent behaviors:  no     Irritability: yes  Racing thoughts: no  Impulsive behaviors: no  Pressured speech:  no     Paranoia:yes  Delusions: yes  AVH:yes     Medical Review Of Systems:  Pertinent items noted in HPI     Psychiatric History:  Diagnose(s): Yes - Paranoid Schizophrenia  Previous Medication Trials: Yes - Navane, Abilify, Zyprexa  Previous Psychiatric Hospitalizations: Yes - 4x (last 2000)  Previous Suicide Attempts: No  History of Violence: No  Outpatient Psychiatrist: Yes - Carol Ann Poe MD     Social History:  Marital Status:   Children: 1   Employment Status: retired  Education: high school diploma/GED  Special Ed: no   History: no  Housing Status: Yes - lives with daughter and " grandchildren  Developmental History: No  History of Abuse: No  Access to Gun: Yes - but it is unloaded     Substance Abuse History:  Recreational Drugs: no  Use of Alcohol: no  Rehab History: No  Tobacco Use: Yes - 10 per day  Use of Caffeine: Yes -  5 to 6 cups per day  Use of OTC: Yes - Benadryl  Legal consequences of chemical use: No  Is the patient aware of the biomedical complications associated with substance abuse and mental illness? No     Legal History:  Past Charges/Incarcerations: No  Pending Charges: No     Family Psychiatric History:   No     Collateral:   Yes - daughter at bedside, who brought pt to ED. She is very concerned, due to her mother being well controlled for years on Navane, and now with inability to access said medication, daughter worried for mother's safety as she has begun to hallucinate, regularly, as well as present agitated on multiple occasions      Psychosocial Stressors: health.   Functioning Relationships: good support system     Psychosocial Factors:  Maladaptive or problem behaviors: No  Peer group, social, ethic, cultural, emotional, and health factors: No  Living situation, family constellation, family circumstances/home: Yes - good support system  Recovery environment: Yes -   Community resources used by patient: No  Treatment acceptance/motivation for change: Yes -    Hospital Course: 12/11/2018  Seen for initial evaluation with treatment team. Patient with long history of schizophrenia treated with Navane (also depression on Effexor XR) who was in their usual state of health until she could not access the Navane due to a shortage at the pharmacy.  Off her neuroleptic she quickly decompensated.  Outpatient pscyhiatrist, Dr. Poe (who I spoke with today to discuss the case), attempted to restabilize her on an outpatient basis but she failed brief trials of Abilify and Zyprexa (only a few days in duration each).  She was initiated on Haldol last night but I spoke with   "Lui and will first initiate a Risperdal trial prior to moving to another typical neuroleptic.  Collateral obtained from daughter.    12/12/2018  Staff reports poor sleep (maybe 3 hours overnight) and that patient was changing clothes frequently. Staff denies patient with delusional content, but concerned for hypomania and intrusiveness. Patient seen and examined with treatment team; chart and nursing notes reviewed. No distress noted, and patient was agreeable and cooperative with interview. Patient states they are feeling "alright" this morning. Patient reports poor sleep sleep overnight, but feels it may be attributed to being a strange environment. Has a stable appetite. No somatic complaints today. Tolerating medications without adverse side effects. Denies SI, HI, AVH, delusions, or paranoia. Patient has been compliant with medications.    12/13/2018  Patient under good behavioral control. Bizarre affect at times, sometimes seen staring into the the nurses station. Patient denies any pain or discomfort. Guarded, but can be pleasant and interacts appropriately with staff and peers. Did not displayed any signs of padmini and did not expressed any delusional thoughts to staff yesterday. Attending groups, taking medications, and denies SI/HI/AV hallucinations. Reports improved sleep, and states she got ~5 hours overnight. Stable appetite. Denies any somatic complaints, but does express some concern for constipation. Patient does request medication to help with sleep and "nerves;" states Benadryl was beneficial in the past.    12/14/2018  Patient has not been a management problem. The patient is medication compliant; no PRN medications administered. Patient endorsed feeling anxious last night with poor sleep related to her anxiety. Staff reports that patient appeared guarded and more withdrawn in the evening hour; did not engage with her peers. No manic or disorganized behavior displayed. Her thought content " "remains appropriate. Daughter plans on coming to visit this weekend and is agreeable to family meeting next week. With treatment team, patient reports doing "better" today. Reports she routinely sleeps sitting up, but alternates with lying down. States she is feeling more like herself today and thinking clearly. Only complaint is Left leg cramps during the day and restless legs at night.    12/15/2018  Patient reports sleep improved though staff states she was still up and down and noted to be sleeping sitting up.  She states she feels like "the ground is moving under my feet" when she walks - had more difficulty characterizing.  Visitor feedback form from daughter reviewed, per daughter she may be guarded about delusional material in effort to secure discharge.  Patient does admit to some paranoia upon my interview but is hesitant to discuss further.  She is amenable to dose titration of risperdal to address.      Interval History: see hospital course    REVIEW OF SYSTEMS  Neuro: denies headache or tremor  GI: no N/V    MENTAL STATUS EXAM  General Appearance:  Stated age, casually dressed  Behavior: cooperative with interview  Speech: conversational, spontaneous  Mood: OK  Affect: low key but euthymic  Thought Process: linear, guarded  Thought Content: +PI.  Denies SI/HI  Memory: grossly intact  Attention: not distractible  Insight: impaired but improving  Judgment: impaired but improving        Family History     Problem Relation (Age of Onset)    Arthritis Brother    Cancer Mother, Father    Colon cancer Mother    Depression Mother    Diabetes Brother    Heart disease Brother    No Known Problems Daughter    Psoriasis Mother    Stroke Sister        Tobacco Use    Smoking status: Former Smoker     Packs/day: 0.50     Years: 10.00     Pack years: 5.00     Types: Cigarettes     Last attempt to quit: 2012     Years since quittin.5    Smokeless tobacco: Former User    Tobacco comment: stopped smoking . " "    Substance and Sexual Activity    Alcohol use: No    Drug use: No    Sexual activity: No     Partners: Male     Birth control/protection: Post-menopausal     Psychotherapeutics (From admission, onward)    Start     Stop Route Frequency Ordered    12/15/18 2100  risperiDONE tablet 4 mg      -- Oral Nightly 12/15/18 0837    12/11/18 1130  venlafaxine 24 hr capsule 150 mg      -- Oral Daily 12/11/18 1117    12/10/18 2310  haloperidol tablet 5 mg  (Med - Acute  Behavioral Management)      -- Oral Every 4 hours PRN 12/10/18 2310    12/10/18 2310  LORazepam tablet 2 mg  (Med - Acute  Behavioral Management)      -- Oral Every 4 hours PRN 12/10/18 2310    12/10/18 2310  haloperidol lactate injection 5 mg  (Med - Acute  Behavioral Management)      -- IM Every 4 hours PRN 12/10/18 2310    12/10/18 2310  lorazepam (ATIVAN) injection 2 mg  (Med - Acute  Behavioral Management)      -- IM Every 4 hours PRN 12/10/18 2310           Review of Systems  Objective:     Vital Signs (Most Recent):  Temp: 97.4 °F (36.3 °C) (12/14/18 1920)  Pulse: 110 (12/14/18 1920)  Resp: 16 (12/14/18 1920)  BP: (!) 143/88 (12/14/18 1920) Vital Signs (24h Range):  Temp:  [97.4 °F (36.3 °C)] 97.4 °F (36.3 °C)  Pulse:  [110] 110  Resp:  [16] 16  BP: (143)/(88) 143/88     Height: 5' 4" (162.6 cm)  Weight: 44.5 kg (98 lb 1.7 oz)  Body mass index is 16.84 kg/m².    No intake or output data in the 24 hours ending 12/15/18 0838    Physical Exam     Significant Labs:   Last 24 Hours:   Recent Lab Results     None          Significant Imaging: None    Assessment/Plan:   Schizophrenia - primary problem  Not yet back to baseline - remains in acute episode  Titrate risperdal to 4mg bedtime - continue to monitor      Restless legs    Reports being prescribed medication from outpatient neurologist, but cannot recall medication name.  - Will start gabapentin 600mg qHS, which should also help with sleep, anxiety, and headache     Tobacco use disorder    - PRN " Nicotine patch  Counseled on cessation, motivational interviewing applied.      Migraine without aura and without status migrainosus, not intractable    - Zanaflex 4mg Q8 PRN for headache  - Tylenol 650 mg PRN Q6 for headache    Neurontin initiated 12/14/18, which should also help with headaches     Hyperlipidemia    - Continue home Atorvastatin 40 mg daily  -  mg daily      Essential hypertension    - Norvasc 10mg daily  - Lisinopril 10mg daily added 12/14/18  - Continue to monitor vitals on the unit     Osteoarthritis    - Tylenol 650 mg Q6 PRN as needed for pain           Need for Continued Hospitalization:   Protective inpatient psychiatric hospitalization required while a safe disposition plan is enacted. and Requires ongoing hospitalization for stabilization of medications.    Anticipated Disposition: Home or Self Care       Amador Arita MD   Psychiatry  [facility]

## 2018-12-16 PROCEDURE — 25000003 PHARM REV CODE 250: Performed by: STUDENT IN AN ORGANIZED HEALTH CARE EDUCATION/TRAINING PROGRAM

## 2018-12-16 PROCEDURE — 25000003 PHARM REV CODE 250: Performed by: PSYCHIATRY & NEUROLOGY

## 2018-12-16 PROCEDURE — 12400001 HC PSYCH SEMI-PRIVATE ROOM

## 2018-12-16 PROCEDURE — 99232 SBSQ HOSP IP/OBS MODERATE 35: CPT | Mod: ,,, | Performed by: PSYCHIATRY & NEUROLOGY

## 2018-12-16 RX ORDER — RISPERIDONE 1 MG/1
2 TABLET ORAL 2 TIMES DAILY
Status: DISCONTINUED | OUTPATIENT
Start: 2018-12-16 | End: 2018-12-18 | Stop reason: HOSPADM

## 2018-12-16 RX ADMIN — DIPHENHYDRAMINE HYDROCHLORIDE 50 MG: 50 CAPSULE ORAL at 08:12

## 2018-12-16 RX ADMIN — FOLIC ACID 1 MG: 1 TABLET ORAL at 08:12

## 2018-12-16 RX ADMIN — THERA TABS 1 TABLET: TAB at 08:12

## 2018-12-16 RX ADMIN — RISPERIDONE 2 MG: 1 TABLET ORAL at 08:12

## 2018-12-16 RX ADMIN — ASPIRIN 325 MG ORAL TABLET 325 MG: 325 PILL ORAL at 08:12

## 2018-12-16 RX ADMIN — LISINOPRIL 10 MG: 10 TABLET ORAL at 08:12

## 2018-12-16 RX ADMIN — VENLAFAXINE HYDROCHLORIDE 150 MG: 150 CAPSULE, EXTENDED RELEASE ORAL at 08:12

## 2018-12-16 RX ADMIN — GABAPENTIN 600 MG: 300 CAPSULE ORAL at 08:12

## 2018-12-16 RX ADMIN — ATORVASTATIN CALCIUM 40 MG: 20 TABLET, FILM COATED ORAL at 08:12

## 2018-12-16 RX ADMIN — AMLODIPINE BESYLATE 10 MG: 10 TABLET ORAL at 08:12

## 2018-12-16 NOTE — PROGRESS NOTES
[facility]  Psychiatry  Progress Note    Patient Name: Bhavna Landry  MRN: [id number]   Code Status: Full Code  Admission Date: 12/10/2018  Hospital Length of Stay: 6 days  Expected Discharge Date:   Attending Physician: Amador Arita MD  Primary Care Provider: Sadaf Vargas MD    [place] [facility]    Patient information was obtained from patient, relative(s), past medical records and ER records.     Subjective:     Principal Problem:Schizophrenia, chronic condition with acute exacerbation    Chief Complaint: psychosis    HPI: Bhavna Landry is a 63 y.o. female with a past psychiatric history of Paranoid Schizophrenia, currently presenting with .  Psychiatry was originally consulted to address the patient's symptoms of auditory hallucinations.     Per ED RN(s):  Pt ran out of her nevane 2 weeks ago for schizophrenia . Pt psychiatrist changed her med to abilify and symptoms worsened. Pt was recently changed to zyprexa for past 3 days with no improvement. Pt talking to herself and having hallucinations.      Per ED MD:  Patient is a 63 year old female with a pmhx of schizophrenia who presents to the ED for psychiatric evaluation. Patient recently d/c Navane which she has been on for over a decade because it was on back order. Her pscyhiatrist presribed her new medication, but patient and her family feels as if she is declining. Over the weekend, patient has been going back in forth in the house, slamming doors, talking to herself. She however denies any auditory or visual hallucinations, suicidal ideations, homicidal ideations. She presents to the ED with her daughter who she stays with  and who is concerned. Patient has a chronic cough, unchanged from baseline. No fever or chills. Smokes 10 cigs per day.     Per Psych MD:  Upon initiation of interview, pt was seated next to ED bed, dressed in casual clothing, in no apparent distress, daughter at bedside. Pt reports a long history of  "paranoid schizophrenia controlled on Navane, however, recently has been unable to fill said prescription, being told that said medication is no longer in production. As of late, pt has been "talking outloud," stating "don't do anything to my daughter, I dont want to hurt anybody, not my granddaughter, I wanna protect her from old people bc I dont want her to get gray hair." Additionally, pt states that she has been agitated due to feeling like "my head and body are being pulled apart." Pt follows with Dr. Poe, who has been utilizing different medications in an attempt to stabilize patient, on an outpt basis, having tried Abilify and Zyprexa, to no avail. Pt denies any other psychiatric symptomatology for depression (SIGECAPS), padmini (DIGFAST), suicidality or homicidality.     Psychiatric Review Of Systems - Is patient experiencing or having changes in:  sleep: no  appetite: no  weight: no  energy/anergy: no  interest/pleasure/anhedonia: no  somatic symptoms: no  guilty/hopelessness: no  concentration: no  S.I.B.s/risky behavior: no  SI/SA:  no     anxiety/panic: yes  Agoraphobia:  no  Social phobia:  no  Recurrent nightmares:  no  hyper startle response:  no  Avoidance: no  Recurrent thoughts:  no  Recurrent behaviors:  no     Irritability: yes  Racing thoughts: no  Impulsive behaviors: no  Pressured speech:  no     Paranoia:yes  Delusions: yes  AVH:yes     Medical Review Of Systems:  Pertinent items noted in HPI     Psychiatric History:  Diagnose(s): Yes - Paranoid Schizophrenia  Previous Medication Trials: Yes - Navane, Abilify, Zyprexa  Previous Psychiatric Hospitalizations: Yes - 4x (last 2000)  Previous Suicide Attempts: No  History of Violence: No  Outpatient Psychiatrist: Yes - Carol Ann Poe MD     Social History:  Marital Status:   Children: 1   Employment Status: retired  Education: high school diploma/GED  Special Ed: no   History: no  Housing Status: Yes - lives with daughter and " grandchildren  Developmental History: No  History of Abuse: No  Access to Gun: Yes - but it is unloaded     Substance Abuse History:  Recreational Drugs: no  Use of Alcohol: no  Rehab History: No  Tobacco Use: Yes - 10 per day  Use of Caffeine: Yes -  5 to 6 cups per day  Use of OTC: Yes - Benadryl  Legal consequences of chemical use: No  Is the patient aware of the biomedical complications associated with substance abuse and mental illness? No     Legal History:  Past Charges/Incarcerations: No  Pending Charges: No     Family Psychiatric History:   No     Collateral:   Yes - daughter at bedside, who brought pt to ED. She is very concerned, due to her mother being well controlled for years on Navane, and now with inability to access said medication, daughter worried for mother's safety as she has begun to hallucinate, regularly, as well as present agitated on multiple occasions      Psychosocial Stressors: health.   Functioning Relationships: good support system     Psychosocial Factors:  Maladaptive or problem behaviors: No  Peer group, social, ethic, cultural, emotional, and health factors: No  Living situation, family constellation, family circumstances/home: Yes - good support system  Recovery environment: Yes -   Community resources used by patient: No  Treatment acceptance/motivation for change: Yes -    Hospital Course: 12/11/2018  Seen for initial evaluation with treatment team. Patient with long history of schizophrenia treated with Navane (also depression on Effexor XR) who was in their usual state of health until she could not access the Navane due to a shortage at the pharmacy.  Off her neuroleptic she quickly decompensated.  Outpatient pscyhiatrist, Dr. Poe (who I spoke with today to discuss the case), attempted to restabilize her on an outpatient basis but she failed brief trials of Abilify and Zyprexa (only a few days in duration each).  She was initiated on Haldol last night but I spoke with   "Lui and will first initiate a Risperdal trial prior to moving to another typical neuroleptic.  Collateral obtained from daughter.    12/12/2018  Staff reports poor sleep (maybe 3 hours overnight) and that patient was changing clothes frequently. Staff denies patient with delusional content, but concerned for hypomania and intrusiveness. Patient seen and examined with treatment team; chart and nursing notes reviewed. No distress noted, and patient was agreeable and cooperative with interview. Patient states they are feeling "alright" this morning. Patient reports poor sleep sleep overnight, but feels it may be attributed to being a strange environment. Has a stable appetite. No somatic complaints today. Tolerating medications without adverse side effects. Denies SI, HI, AVH, delusions, or paranoia. Patient has been compliant with medications.    12/13/2018  Patient under good behavioral control. Bizarre affect at times, sometimes seen staring into the the nurses station. Patient denies any pain or discomfort. Guarded, but can be pleasant and interacts appropriately with staff and peers. Did not displayed any signs of padmini and did not expressed any delusional thoughts to staff yesterday. Attending groups, taking medications, and denies SI/HI/AV hallucinations. Reports improved sleep, and states she got ~5 hours overnight. Stable appetite. Denies any somatic complaints, but does express some concern for constipation. Patient does request medication to help with sleep and "nerves;" states Benadryl was beneficial in the past.    12/14/2018  Patient has not been a management problem. The patient is medication compliant; no PRN medications administered. Patient endorsed feeling anxious last night with poor sleep related to her anxiety. Staff reports that patient appeared guarded and more withdrawn in the evening hour; did not engage with her peers. No manic or disorganized behavior displayed. Her thought content " "remains appropriate. Daughter plans on coming to visit this weekend and is agreeable to family meeting next week. With treatment team, patient reports doing "better" today. Reports she routinely sleeps sitting up, but alternates with lying down. States she is feeling more like herself today and thinking clearly. Only complaint is Left leg cramps during the day and restless legs at night.    12/15/2018  Patient reports sleep improved though staff states she was still up and down and noted to be sleeping sitting up.  She states she feels like "the ground is moving under my feet" when she walks - had more difficulty characterizing.  Visitor feedback form from daughter reviewed, per daughter she may be guarded about delusional material in effort to secure discharge.  Patient does admit to some paranoia upon my interview but is hesitant to discuss further.  She is amenable to dose titration of risperdal to address.      12/16/2018  Patient's sleep appears to be improved.  She complains about what sounds like somatic delusions - "people are moving through my body".  She asks for medication in the morning - we discussed splitting her risperdal dose and she is amenable to this.  She denies SI/HI.  She states daughter visited.      Interval History: see hospital course    REVIEW OF SYSTEMS  GI: upset stomach yesterday - not today  Neuro: denies headache or restless legs    MENTAL STATUS EXAM  General Appearance: stated age, casually dressed   Behavior: calm and cooperative with interview  Speech: decreased spontaneity but answers questions  Mood: "alright" - denies depression  Affect: pleasant but oddly related, low key  Thought Process: linear but guarded  Thought Content: +delusions.  Denies SI/HI  Memory: grossly intact  Attention: not distractible  Insight: impaired  Judgment: impaired but improving        Family History     Problem Relation (Age of Onset)    Arthritis Brother    Cancer Mother, Father    Colon cancer " "Mother    Depression Mother    Diabetes Brother    Heart disease Brother    No Known Problems Daughter    Psoriasis Mother    Stroke Sister        Tobacco Use    Smoking status: Former Smoker     Packs/day: 0.50     Years: 10.00     Pack years: 5.00     Types: Cigarettes     Last attempt to quit: 2012     Years since quittin.5    Smokeless tobacco: Former User    Tobacco comment: stopped smoking 2017.     Substance and Sexual Activity    Alcohol use: No    Drug use: No    Sexual activity: No     Partners: Male     Birth control/protection: Post-menopausal     Psychotherapeutics (From admission, onward)    Start     Stop Route Frequency Ordered    18 0900  risperiDONE tablet 2 mg      -- Oral 2 times daily 18 0739    18 1130  venlafaxine 24 hr capsule 150 mg      -- Oral Daily 18 1117    12/10/18 2310  haloperidol tablet 5 mg  (Med - Acute  Behavioral Management)      -- Oral Every 4 hours PRN 12/10/18 2310    12/10/18 2310  LORazepam tablet 2 mg  (Med - Acute  Behavioral Management)      -- Oral Every 4 hours PRN 12/10/18 2310    12/10/18 2310  haloperidol lactate injection 5 mg  (Med - Acute  Behavioral Management)      -- IM Every 4 hours PRN 12/10/18 2310    12/10/18 2310  lorazepam (ATIVAN) injection 2 mg  (Med - Acute  Behavioral Management)      -- IM Every 4 hours PRN 12/10/18 2310           Review of Systems  Objective:     Vital Signs (Most Recent):  Temp: 98 °F (36.7 °C) (12/15/18 1931)  Pulse: 104 (12/15/18 1931)  Resp: 17 (12/15/18 1931)  BP: (!) 145/68 (12/15/18 1931) Vital Signs (24h Range):  Temp:  [98 °F (36.7 °C)] 98 °F (36.7 °C)  Pulse:  [104-120] 104  Resp:  [16-17] 17  BP: (139-145)/(61-68) 145/68     Height: 5' 4" (162.6 cm)  Weight: 44.5 kg (98 lb 1.7 oz)  Body mass index is 16.84 kg/m².    No intake or output data in the 24 hours ending 18 0741    Physical Exam     Significant Labs:   Last 24 Hours:   Recent Lab Results     None          Significant " Imaging: None    Assessment/Plan:     * Schizophrenia, chronic condition with acute exacerbation    Bhavna Landry is a 63 y.o. female with a past psychiatric history of Paranoid Schizophrenia, who presented with AH.  Psychiatry was originally consulted to address the patient's symptoms of auditory hallucinations. Pt with long history of medication controlled paranoid schizophrenia, now due to inability to access previous med regimen, pt now presenting with paranoia and auditory hallucinations, in need of revamped med regimen in order to stabilize current psychotic symptomatology.     - Continue Effexor 150mg daily  - Started on Haldol, but changed to Risperdal 2mg qHS for psychotic features. Risperdal increased to 3mg qHS on 12/12/18.  Risperdal increased to 4mg bedtime on 12/15/18 and then split to 2mg bid on 12/16/18 per her request.     PRN Medication(s):  - Haldol 5mg PO/IM q6hr PRN for non-redirectable agitation  - Ativan 2mg PO/IM q4hr PRN for non-redirectable agitation  - Benadryl 25mg TID PRN for breakthrough anxiety  - Benadryl 50mg qHS for insomnia, per patient request    Patient under good behavioral control on unit, showing attenuation of psychosis but not yet back to baseline.        Restless legs    Reports being prescribed medication from outpatient neurologist, but cannot recall medication name.  - Will start gabapentin 600mg qHS, which should also help with sleep, anxiety, and headache     Tobacco use disorder    - PRN Nicotine patch  Counseled on cessation, motivational interviewing applied.      Migraine without aura and without status migrainosus, not intractable    - Zanaflex 4mg Q8 PRN for headache  - Tylenol 650 mg PRN Q6 for headache    Neurontin initiated 12/14/18, which should also help with headaches     Hyperlipidemia    - Continue home Atorvastatin 40 mg daily  -  mg daily      Essential hypertension    - Norvasc 10mg daily  - Lisinopril 10mg daily added 12/14/18  - Continue to  monitor vitals on the unit     Osteoarthritis    - Tylenol 650 mg Q6 PRN as needed for pain           Need for Continued Hospitalization:   Protective inpatient psychiatric hospitalization required while a safe disposition plan is enacted. and Requires ongoing hospitalization for stabilization of medications.    Anticipated Disposition: Home or Self Care       Amador Arita MD   Psychiatry  [facility]

## 2018-12-16 NOTE — PLAN OF CARE
12/16/18 1000   [facility] Group Therapy   Group Name Medication   Participation Level Appropriate;Attentive   Participation Quality Cooperative   Insight/Motivation Good   Affect/Mood Display Appropriate   Cognition Alert

## 2018-12-16 NOTE — PLAN OF CARE
Problem: Adult Behavioral Health Plan of Care  Goal: Plan of Care Review  Outcome: Ongoing (interventions implemented as appropriate)  Pt remains calm and compliant, pleasant on approach. Pt isolates but attends most groups. Taking all medications, compliant with treatment plan. No signs of distress noted or reported. Denies SI/HI/AV hallucinations. MVC maintained.

## 2018-12-16 NOTE — PROGRESS NOTES
12/16/18 0900 12/16/18 1000 12/16/18 1030   [facility] Group Therapy   Group Name Community Reintegration Medication Mental Awareness   Specific Interventions Current Events --  Cognitive Stimulation Training   Participation Level Active;Appropriate Appropriate;Attentive Active;Appropriate   Participation Quality Cooperative Cooperative Cooperative   Insight/Motivation Good Good Good   Affect/Mood Display Appropriate Appropriate Appropriate   Cognition Oriented Alert Oriented       12/16/18 1100 12/16/18 1245   [facility] Group Therapy   Group Name Stress Management Therapeutic Recreation   Specific Interventions Guided Imagery/Relaxation ( movie social)   Participation Level Appropriate;Attentive Appropriate;Attentive   Participation Quality Cooperative Cooperative   Insight/Motivation Good Good   Affect/Mood Display Appropriate Appropriate   Cognition Oriented Oriented

## 2018-12-16 NOTE — SUBJECTIVE & OBJECTIVE
"Interval History: see hospital course    REVIEW OF SYSTEMS  GI: upset stomach yesterday - not today  Neuro: denies headache or restless legs    MENTAL STATUS EXAM  General Appearance: stated age, casually dressed   Behavior: calm and cooperative with interview  Speech: decreased spontaneity but answers questions  Mood: "alright" - denies depression  Affect: pleasant but oddly related, low key  Thought Process: linear but guarded  Thought Content: +delusions.  Denies SI/HI  Memory: grossly intact  Attention: not distractible  Insight: impaired  Judgment: impaired but improving        Family History     Problem Relation (Age of Onset)    Arthritis Brother    Cancer Mother, Father    Colon cancer Mother    Depression Mother    Diabetes Brother    Heart disease Brother    No Known Problems Daughter    Psoriasis Mother    Stroke Sister        Tobacco Use    Smoking status: Former Smoker     Packs/day: 0.50     Years: 10.00     Pack years: 5.00     Types: Cigarettes     Last attempt to quit: 2012     Years since quittin.5    Smokeless tobacco: Former User    Tobacco comment: stopped smoking 2017.     Substance and Sexual Activity    Alcohol use: No    Drug use: No    Sexual activity: No     Partners: Male     Birth control/protection: Post-menopausal     Psychotherapeutics (From admission, onward)    Start     Stop Route Frequency Ordered    18 0900  risperiDONE tablet 2 mg      -- Oral 2 times daily 18 0739    18 1130  venlafaxine 24 hr capsule 150 mg      -- Oral Daily 18 1117    12/10/18 231  haloperidol tablet 5 mg  (Med - Acute  Behavioral Management)      -- Oral Every 4 hours PRN 12/10/18 2310    12/10/18 231  LORazepam tablet 2 mg  (Med - Acute  Behavioral Management)      -- Oral Every 4 hours PRN 12/10/18 2310    12/10/18 231  haloperidol lactate injection 5 mg  (Med - Acute  Behavioral Management)      -- IM Every 4 hours PRN 12/10/18 2310    12/10/18 231  lorazepam " "(ATIVAN) injection 2 mg  (Med - Acute  Behavioral Management)      -- IM Every 4 hours PRN 12/10/18 2310           Review of Systems  Objective:     Vital Signs (Most Recent):  Temp: 98 °F (36.7 °C) (12/15/18 1931)  Pulse: 104 (12/15/18 1931)  Resp: 17 (12/15/18 1931)  BP: (!) 145/68 (12/15/18 1931) Vital Signs (24h Range):  Temp:  [98 °F (36.7 °C)] 98 °F (36.7 °C)  Pulse:  [104-120] 104  Resp:  [16-17] 17  BP: (139-145)/(61-68) 145/68     Height: 5' 4" (162.6 cm)  Weight: 44.5 kg (98 lb 1.7 oz)  Body mass index is 16.84 kg/m².    No intake or output data in the 24 hours ending 12/16/18 0741    Physical Exam     Significant Labs:   Last 24 Hours:   Recent Lab Results     None          Significant Imaging: None  "

## 2018-12-16 NOTE — PSYCH
"The patient requested that she "take risperidone twice a day instead of once a day because I can feel it wear off". When asked to elaborate on this the patient stated, "I can just feel it. My body feels different". When pressed to further describe the difference she was feeling, the patient was unable to do so. Her sleep was restless and broken this evening. At one point the patient woke up, walked to the nurse's station and stated, "I didn't get my medications that you were supposed to give me earlier in the night". The patient required reorientation and was informed that she indeed had been administered her medications. She continues to sleep sitting up in both her bed and in the chair. The patient remains guarded and withdrawn to her room throughout the night. MVC maintained. Frequent safety rounds performed. Will continue to monitor.   "

## 2018-12-17 PROCEDURE — 25000003 PHARM REV CODE 250: Performed by: STUDENT IN AN ORGANIZED HEALTH CARE EDUCATION/TRAINING PROGRAM

## 2018-12-17 PROCEDURE — 90853 GROUP PSYCHOTHERAPY: CPT | Mod: ,,, | Performed by: PSYCHOLOGIST

## 2018-12-17 PROCEDURE — 12400001 HC PSYCH SEMI-PRIVATE ROOM

## 2018-12-17 PROCEDURE — 25000003 PHARM REV CODE 250: Performed by: PSYCHIATRY & NEUROLOGY

## 2018-12-17 PROCEDURE — 99232 SBSQ HOSP IP/OBS MODERATE 35: CPT | Mod: ,,, | Performed by: PSYCHIATRY & NEUROLOGY

## 2018-12-17 RX ADMIN — GABAPENTIN 600 MG: 300 CAPSULE ORAL at 08:12

## 2018-12-17 RX ADMIN — RISPERIDONE 2 MG: 1 TABLET ORAL at 08:12

## 2018-12-17 RX ADMIN — ATORVASTATIN CALCIUM 40 MG: 20 TABLET, FILM COATED ORAL at 10:12

## 2018-12-17 RX ADMIN — DIPHENHYDRAMINE HYDROCHLORIDE 50 MG: 50 CAPSULE ORAL at 08:12

## 2018-12-17 RX ADMIN — VENLAFAXINE HYDROCHLORIDE 150 MG: 150 CAPSULE, EXTENDED RELEASE ORAL at 10:12

## 2018-12-17 RX ADMIN — FOLIC ACID 1 MG: 1 TABLET ORAL at 10:12

## 2018-12-17 RX ADMIN — ASPIRIN 325 MG ORAL TABLET 325 MG: 325 PILL ORAL at 10:12

## 2018-12-17 RX ADMIN — LISINOPRIL 10 MG: 10 TABLET ORAL at 10:12

## 2018-12-17 RX ADMIN — RISPERIDONE 2 MG: 1 TABLET ORAL at 10:12

## 2018-12-17 RX ADMIN — THERA TABS 1 TABLET: TAB at 10:12

## 2018-12-17 RX ADMIN — AMLODIPINE BESYLATE 10 MG: 10 TABLET ORAL at 10:12

## 2018-12-17 NOTE — PLAN OF CARE
Problem: Adult Behavioral Health Plan of Care  Goal: Plan of Care Review  Outcome: Ongoing (interventions implemented as appropriate)  POC discussed with pt, calm and cooperative on the unit. Follows direction and attends group with minimal participation. Med compliant, good hygiene,and good appetite. Denies SI/HI/AVH, flat affect, thoughts are linear. Mood is fair. Out visible on the unit. Safety plan reviewed and environmental rounds done. Reviewed medicine with pt will require further instruction. Pt given time to ask questions, all questions answered. MVC in place will continue to monitor.     Problem: Cognitive Impairment (Depressive Signs/Symptoms)  Goal: Improved Ability to Think and Concentrate (Depressive Signs/Symptoms)  Outcome: Ongoing (interventions implemented as appropriate)  Pt denies being depressed.     Problem: Mental State/Mood Impairment (Psychotic Signs/Symptoms)  Goal: Improved Mental State and Mood (Psychotic Signs/Symptoms)  Outcome: Ongoing (interventions implemented as appropriate)  Pt thoughts are linear and relevant. She is calm and cooperative. Pt does not engage with her peers during the evening.     Problem: Cognitive Impairment (Manic/Hypomanic Signs/Symptoms)  Goal: Improved Cognitive Function (Manic/Hypomanic Signs/Symptoms)  Outcome: Ongoing (interventions implemented as appropriate)  Pt following instruction she did require some redirection during the evening but did comply with verbal instruction.

## 2018-12-17 NOTE — NURSING
Patients mood is a little anxious.  Cooperative with all rules and routines.  Patient does not appears overtly delusional.  A little paranoid at times.  She has poor insight into her illness and aftercare needs.  Medication compliant.  Patient daughter reports she is almost back to her baseline.  Patient does not think she has schizophrenia.  Appearance appropriate.

## 2018-12-17 NOTE — ASSESSMENT & PLAN NOTE
Bhavna Landry is a 63 y.o. female with a past psychiatric history of Paranoid Schizophrenia, who presented with AH.  Psychiatry was originally consulted to address the patient's symptoms of auditory hallucinations. Pt with long history of medication controlled paranoid schizophrenia, now due to inability to access previous med regimen, pt now presenting with paranoia and auditory hallucinations, in need of revamped med regimen in order to stabilize current psychotic symptomatology.     - Continue Effexor 150mg daily  - Started on Haldol, but changed to Risperdal 2mg qHS for psychotic features. Risperdal increased to 3mg qHS on 12/12/18, 4mg qHS on 12/15, and 2mg BID on 12/16.     PRN Medication(s):  - Haldol 5mg PO/IM q6hr PRN for non-redirectable agitation  - Ativan 2mg PO/IM q4hr PRN for non-redirectable agitation  - Benadryl 25mg TID PRN for breakthrough anxiety      Patient under good behavioral control on unit, showing attenuation of psychosis. Spoke with patient's daughter, and arranged a family meeting.for 12/18 at 1000.

## 2018-12-17 NOTE — PROGRESS NOTES
[facility]  Psychiatry  Progress Note    Patient Name: Bhavna Landry  MRN: [id number]   Code Status: Full Code  Admission Date: 12/10/2018  Hospital Length of Stay: 7 days  Expected Discharge Date:   Attending Physician: Amador Arita MD  Primary Care Provider: Sadaf Vargas MD    Current Legal Status: [facility] (expires 12/25 @ 0635)    Patient information was obtained from patient and EHR.     Subjective:     Principal Problem:Schizophrenia, chronic condition with acute exacerbation    Chief Complaint: AH    HPI: Bhavna Landry is a 63 y.o. female with a past psychiatric history of Paranoid Schizophrenia, currently presenting with AH.  Psychiatry was originally consulted to address the patient's symptoms of auditory hallucinations.     Per ED RN(s):  Pt ran out of her nevane 2 weeks ago for schizophrenia . Pt psychiatrist changed her med to abilify and symptoms worsened. Pt was recently changed to zyprexa for past 3 days with no improvement. Pt talking to herself and having hallucinations.      Per ED MD:  Patient is a 63 year old female with a pmhx of schizophrenia who presents to the ED for psychiatric evaluation. Patient recently d/c Navane which she has been on for over a decade because it was on back order. Her pscyhiatrist presribed her new medication, but patient and her family feels as if she is declining. Over the weekend, patient has been going back in forth in the house, slamming doors, talking to herself. She however denies any auditory or visual hallucinations, suicidal ideations, homicidal ideations. She presents to the ED with her daughter who she stays with  and who is concerned. Patient has a chronic cough, unchanged from baseline. No fever or chills. Smokes 10 cigs per day.     Per Psych MD:  Upon initiation of interview, pt was seated next to ED bed, dressed in casual clothing, in no apparent distress, daughter at bedside. Pt reports a long history of paranoid schizophrenia  "controlled on Navane, however, recently has been unable to fill said prescription, being told that said medication is no longer in production. As of late, pt has been "talking outloud," stating "don't do anything to my daughter, I dont want to hurt anybody, not my granddaughter, I wanna protect her from old people bc I dont want her to get gray hair." Additionally, pt states that she has been agitated due to feeling like "my head and body are being pulled apart." Pt follows with Dr. Poe, who has been utilizing different medications in an attempt to stabilize patient, on an outpt basis, having tried Abilify and Zyprexa, to no avail. Pt denies any other psychiatric symptomatology for depression (SIGECAPS), padmini (DIGFAST), suicidality or homicidality.     Psychiatric Review Of Systems - Is patient experiencing or having changes in:  sleep: no  appetite: no  weight: no  energy/anergy: no  interest/pleasure/anhedonia: no  somatic symptoms: no  guilty/hopelessness: no  concentration: no  S.I.B.s/risky behavior: no  SI/SA:  no     anxiety/panic: yes  Agoraphobia:  no  Social phobia:  no  Recurrent nightmares:  no  hyper startle response:  no  Avoidance: no  Recurrent thoughts:  no  Recurrent behaviors:  no     Irritability: yes  Racing thoughts: no  Impulsive behaviors: no  Pressured speech:  no     Paranoia:yes  Delusions: yes  AVH:yes     Medical Review Of Systems:  Pertinent items noted in HPI     Psychiatric History:  Diagnose(s): Yes - Paranoid Schizophrenia  Previous Medication Trials: Yes - Navane, Abilify, Zyprexa  Previous Psychiatric Hospitalizations: Yes - 4x (last 2000)  Previous Suicide Attempts: No  History of Violence: No  Outpatient Psychiatrist: Yes - Carol Ann Poe MD     Social History:  Marital Status:   Children: 1   Employment Status: retired  Education: high school diploma/GED  Special Ed: no   History: no  Housing Status: Yes - lives with daughter and " grandchildren  Developmental History: No  History of Abuse: No  Access to Gun: Yes - but it is unloaded     Substance Abuse History:  Recreational Drugs: no  Use of Alcohol: no  Rehab History: No  Tobacco Use: Yes - 10 per day  Use of Caffeine: Yes -  5 to 6 cups per day  Use of OTC: Yes - Benadryl  Legal consequences of chemical use: No  Is the patient aware of the biomedical complications associated with substance abuse and mental illness? No     Legal History:  Past Charges/Incarcerations: No  Pending Charges: No     Family Psychiatric History:   No     Collateral:   Yes - daughter at bedside, who brought pt to ED. She is very concerned, due to her mother being well controlled for years on Navane, and now with inability to access said medication, daughter worried for mother's safety as she has begun to hallucinate, regularly, as well as present agitated on multiple occasions      Psychosocial Stressors: health.   Functioning Relationships: good support system     Psychosocial Factors:  Maladaptive or problem behaviors: No  Peer group, social, ethic, cultural, emotional, and health factors: No  Living situation, family constellation, family circumstances/home: Yes - good support system  Recovery environment: Yes -   Community resources used by patient: No  Treatment acceptance/motivation for change: Yes -    Hospital Course: 12/11/2018  Seen for initial evaluation with treatment team. Patient with long history of schizophrenia treated with Navane (also depression on Effexor XR) who was in their usual state of health until she could not access the Navane due to a shortage at the pharmacy.  Off her neuroleptic she quickly decompensated.  Outpatient pscyhiatrist, Dr. Poe (who I spoke with today to discuss the case), attempted to restabilize her on an outpatient basis but she failed brief trials of Abilify and Zyprexa (only a few days in duration each).  She was initiated on Haldol last night but I spoke with   "Lui and will first initiate a Risperdal trial prior to moving to another typical neuroleptic.  Collateral obtained from daughter.    12/12/2018  Staff reports poor sleep (maybe 3 hours overnight) and that patient was changing clothes frequently. Staff denies patient with delusional content, but concerned for hypomania and intrusiveness. Patient seen and examined with treatment team; chart and nursing notes reviewed. No distress noted, and patient was agreeable and cooperative with interview. Patient states they are feeling "alright" this morning. Patient reports poor sleep sleep overnight, but feels it may be attributed to being a strange environment. Has a stable appetite. No somatic complaints today. Tolerating medications without adverse side effects. Denies SI, HI, AVH, delusions, or paranoia. Patient has been compliant with medications.    12/13/2018  Patient under good behavioral control. Bizarre affect at times, sometimes seen staring into the the nurses station. Patient denies any pain or discomfort. Guarded, but can be pleasant and interacts appropriately with staff and peers. Did not displayed any signs of padmini and did not expressed any delusional thoughts to staff yesterday. Attending groups, taking medications, and denies SI/HI/AV hallucinations. Reports improved sleep, and states she got ~5 hours overnight. Stable appetite. Denies any somatic complaints, but does express some concern for constipation. Patient does request medication to help with sleep and "nerves;" states Benadryl was beneficial in the past.    12/14/2018  Patient has not been a management problem. The patient is medication compliant; no PRN medications administered. Patient endorsed feeling anxious last night with poor sleep related to her anxiety. Staff reports that patient appeared guarded and more withdrawn in the evening hour; did not engage with her peers. No manic or disorganized behavior displayed. Her thought content " "remains appropriate. Daughter plans on coming to visit this weekend and is agreeable to family meeting next week. With treatment team, patient reports doing "better" today. Reports she routinely sleeps sitting up, but alternates with lying down. States she is feeling more like herself today and thinking clearly. Only complaint is Left leg cramps during the day and restless legs at night.    12/15/2018  Patient reports sleep improved though staff states she was still up and down and noted to be sleeping sitting up.  She states she feels like "the ground is moving under my feet" when she walks - had more difficulty characterizing.  Visitor feedback form from daughter reviewed, per daughter she may be guarded about delusional material in effort to secure discharge.  Patient does admit to some paranoia upon my interview but is hesitant to discuss further.  She is amenable to dose titration of risperdal to address.      12/16/2018  Patient's sleep appears to be improved.  She complains about what sounds like somatic delusions - "people are moving through my body".  She asks for medication in the morning - we discussed splitting her risperdal dose and she is amenable to this.  She denies SI/HI.  She states daughter visited.    12/17/2018  Per staff, patient slept 8 hours. She remains anxious but cooperative on the unit. Does attend groups, but with less participation in the evenings. Daughter visited over the weekend and reports patient "mostly" back to baseline. Med compliant, good hygiene,and good appetite. Denies SI/HI/AVH, bright affect, thoughts are linear. Denies any depression. Feels mood is "good." Denies any delusions or paranoia. Poor insight, and does not feel like she has schizophrenia, but acknowledges that medications are helpful. Some illogical statements involving hypnotizing herself and experiencing people moving through her body.    Interval History: See hospital course    Family History     Problem " Relation (Age of Onset)    Arthritis Brother    Cancer Mother, Father    Colon cancer Mother    Depression Mother    Diabetes Brother    Heart disease Brother    No Known Problems Daughter    Psoriasis Mother    Stroke Sister        Tobacco Use    Smoking status: Former Smoker     Packs/day: 0.50     Years: 10.00     Pack years: 5.00     Types: Cigarettes     Last attempt to quit: 2012     Years since quittin.5    Smokeless tobacco: Former User    Tobacco comment: stopped smoking 2017.     Substance and Sexual Activity    Alcohol use: No    Drug use: No    Sexual activity: No     Partners: Male     Birth control/protection: Post-menopausal     Psychotherapeutics (From admission, onward)    Start     Stop Route Frequency Ordered    18 0900  risperiDONE tablet 2 mg      -- Oral 2 times daily 18 0739    18 1130  venlafaxine 24 hr capsule 150 mg      -- Oral Daily 18 1117    12/10/18 2310  haloperidol tablet 5 mg  (Med - Acute  Behavioral Management)      -- Oral Every 4 hours PRN 12/10/18 2310    12/10/18 2310  LORazepam tablet 2 mg  (Med - Acute  Behavioral Management)      -- Oral Every 4 hours PRN 12/10/18 2310    12/10/18 2310  haloperidol lactate injection 5 mg  (Med - Acute  Behavioral Management)      -- IM Every 4 hours PRN 12/10/18 2310    12/10/18 2310  lorazepam (ATIVAN) injection 2 mg  (Med - Acute  Behavioral Management)      -- IM Every 4 hours PRN 12/10/18 2310           Review of Systems   Constitutional: Positive for diaphoresis. Negative for chills and fever.   Respiratory: Negative for cough and shortness of breath.    Cardiovascular: Negative for chest pain.   Gastrointestinal: Positive for constipation. Negative for abdominal pain, diarrhea, nausea and vomiting.   Neurological: Negative for headaches.   Psychiatric/Behavioral: Negative for behavioral problems, dysphoric mood, hallucinations, sleep disturbance and suicidal ideas. The patient is not  "nervous/anxious.      Objective:     Vital Signs (Most Recent):  Temp: 97.3 °F (36.3 °C) (12/17/18 0733)  Pulse: 108 (12/17/18 0733)  Resp: 18 (12/17/18 0733)  BP: (!) 140/71 (12/17/18 0733)  SpO2: 98 % (12/16/18 1929) Vital Signs (24h Range):  Temp:  [97.3 °F (36.3 °C)-98 °F (36.7 °C)] 97.3 °F (36.3 °C)  Pulse:  [] 108  Resp:  [18] 18  SpO2:  [98 %] 98 %  BP: (140-166)/(71-78) 140/71     Height: 5' 4" (162.6 cm)  Weight: 44.5 kg (98 lb 1.7 oz)  Body mass index is 16.84 kg/m².    No intake or output data in the 24 hours ending 12/17/18 0800    Physical Exam   Musculoskeletal:   AIMS (12/12): 0   Psychiatric:   Mental Status Exam:  Arousal: alert  Sensorium/Orientation: grossly intact  Behavior/Cooperation: normal, friendly and cooperative   Psychomotor: unremarkable and within normal limits  Speech: conversational rate, tone, and volume  Language: answered questions appropriately  Mood: "good"   Affect: Appropriate and reactive to content  Thought Process: linear, logical  Thought Content:   Auditory hallucinations: NO  Visual hallucinations: NO  Paranoia: NO  Delusions:  NO  Suicidal ideation: NO  Homicidal ideation: NO  Attention/Concentration:  intact  able to focus  Memory: grossly intact     Insight: Intact  Judgment:Intact           Significant Labs: Labs:    No results found for: LITHIUM, PHENYTOIN, PHENOBARB, VALPROATE, CBMZ    CBC:   Recent Labs   Lab 12/11/18  0940   WBC 8.69   RBC 5.11   HGB 15.3   HCT 44.8      MCV 88   MCH 29.9   MCHC 34.2     CMP:   Recent Labs   Lab 12/11/18  0940   *   CALCIUM 10.6*   ALBUMIN 3.6   PROT 6.9   *   K 3.8   CO2 25   CL 96   BUN 7*   CREATININE 1.0   ALKPHOS 93   ALT 25   AST 36   BILITOT 0.4     Labs within the past 24 hours have been reviewed.    Significant Imaging: None    Assessment/Plan:     * Schizophrenia, chronic condition with acute exacerbation    Bhavna Landry is a 63 y.o. female with a past psychiatric history of Paranoid " Schizophrenia, who presented with AH.  Psychiatry was originally consulted to address the patient's symptoms of auditory hallucinations. Pt with long history of medication controlled paranoid schizophrenia, now due to inability to access previous med regimen, pt now presenting with paranoia and auditory hallucinations, in need of revamped med regimen in order to stabilize current psychotic symptomatology.     - Continue Effexor 150mg daily  - Started on Haldol, but changed to Risperdal 2mg qHS for psychotic features. Risperdal increased to 3mg qHS on 12/12/18, 4mg qHS on 12/15, and 2mg BID on 12/16.     PRN Medication(s):  - Haldol 5mg PO/IM q6hr PRN for non-redirectable agitation  - Ativan 2mg PO/IM q4hr PRN for non-redirectable agitation  - Benadryl 25mg TID PRN for breakthrough anxiety      Patient under good behavioral control on unit, showing attenuation of psychosis. Spoke with patient's daughter, and arranged a family meeting.for 12/18 at 1000.        Restless legs    Reports being prescribed medication from outpatient neurologist, but cannot recall medication name.  - Will start gabapentin 600mg qHS, which should also help with sleep, anxiety, and headache     Tobacco use disorder    - PRN Nicotine patch  Counseled on cessation, motivational interviewing applied.      Migraine without aura and without status migrainosus, not intractable    - Zanaflex 4mg Q8 PRN for headache  - Tylenol 650 mg PRN Q6 for headache    Neurontin initiated 12/14/18, which should also help with headaches     Insomnia    - Benadryl 50mg qHS for insomnia, per patient request     Hyperlipidemia    - Continue home Atorvastatin 40 mg daily  -  mg daily      Essential hypertension    - Norvasc 10mg daily  - Lisinopril 10mg daily added 12/14/18  - Continue to monitor vitals on the unit     Osteoarthritis    - Tylenol 650 mg Q6 PRN as needed for pain           Need for Continued Hospitalization:   Psychiatric illness continues to  pose a potential threat to life or bodily function, of self or others, thereby requiring the need for continued inpatient psychiatric hospitalization., Protective inpatient psychiatric hospitalization required while a safe disposition plan is enacted. and Requires ongoing hospitalization for stabilization of medications.    Anticipated Disposition: Home or Self Care     Zak Cosby MD  [facility]  PGY-4  12/17/2018

## 2018-12-17 NOTE — PROGRESS NOTES
12/17/18 0900 12/17/18 1000 12/17/18 1100   [facility] Group Therapy   Group Name Community Reintegration Mental Awareness Education   Specific Interventions Current Events Cognitive Stimulation Training Guided Imagery/Relaxation   Participation Level Active;Appropriate Active;Appropriate;Attentive Active;Appropriate;Attentive   Participation Quality Cooperative Cooperative;Social Cooperative;Social   Insight/Motivation Good Good Good   Affect/Mood Display Appropriate Appropriate Appropriate   Cognition Alert Alert Alert       12/17/18 1300   [facility] Group Therapy   Group Name Therapeutic Recreation   Specific Interventions Skilled Activity Crafts   Participation Level Active;Appropriate   Participation Quality Cooperative   Insight/Motivation Good   Affect/Mood Display Appropriate   Cognition Alert

## 2018-12-17 NOTE — PROGRESS NOTES
12/16/18 2000   [facility] Group Therapy   Group Name Community Reintegration   Specific Interventions Current Events   Participation Level None   Participation Quality Refused

## 2018-12-17 NOTE — NURSING
Pt slept 8 hours she remains anxious but cooperative on the unit pt does attend group but does not participate. She does not engage in any social interaction with her peers. MVC in place will continue to monitor.

## 2018-12-17 NOTE — SUBJECTIVE & OBJECTIVE
Interval History: See hospital course    Family History     Problem Relation (Age of Onset)    Arthritis Brother    Cancer Mother, Father    Colon cancer Mother    Depression Mother    Diabetes Brother    Heart disease Brother    No Known Problems Daughter    Psoriasis Mother    Stroke Sister        Tobacco Use    Smoking status: Former Smoker     Packs/day: 0.50     Years: 10.00     Pack years: 5.00     Types: Cigarettes     Last attempt to quit: 2012     Years since quittin.5    Smokeless tobacco: Former User    Tobacco comment: stopped smoking 2017.     Substance and Sexual Activity    Alcohol use: No    Drug use: No    Sexual activity: No     Partners: Male     Birth control/protection: Post-menopausal     Psychotherapeutics (From admission, onward)    Start     Stop Route Frequency Ordered    18 0900  risperiDONE tablet 2 mg      -- Oral 2 times daily 18 0739    18 1130  venlafaxine 24 hr capsule 150 mg      -- Oral Daily 18 1117    12/10/18 2310  haloperidol tablet 5 mg  (Med - Acute  Behavioral Management)      -- Oral Every 4 hours PRN 12/10/18 2310    12/10/18 2310  LORazepam tablet 2 mg  (Med - Acute  Behavioral Management)      -- Oral Every 4 hours PRN 12/10/18 2310    12/10/18 2310  haloperidol lactate injection 5 mg  (Med - Acute  Behavioral Management)      -- IM Every 4 hours PRN 12/10/18 2310    12/10/18 2310  lorazepam (ATIVAN) injection 2 mg  (Med - Acute  Behavioral Management)      -- IM Every 4 hours PRN 12/10/18 2310           Review of Systems   Constitutional: Positive for diaphoresis. Negative for chills and fever.   Respiratory: Negative for cough and shortness of breath.    Cardiovascular: Negative for chest pain.   Gastrointestinal: Positive for constipation. Negative for abdominal pain, diarrhea, nausea and vomiting.   Neurological: Negative for headaches.   Psychiatric/Behavioral: Negative for behavioral problems, dysphoric mood, hallucinations,  "sleep disturbance and suicidal ideas. The patient is not nervous/anxious.      Objective:     Vital Signs (Most Recent):  Temp: 97.3 °F (36.3 °C) (12/17/18 0733)  Pulse: 108 (12/17/18 0733)  Resp: 18 (12/17/18 0733)  BP: (!) 140/71 (12/17/18 0733)  SpO2: 98 % (12/16/18 1929) Vital Signs (24h Range):  Temp:  [97.3 °F (36.3 °C)-98 °F (36.7 °C)] 97.3 °F (36.3 °C)  Pulse:  [] 108  Resp:  [18] 18  SpO2:  [98 %] 98 %  BP: (140-166)/(71-78) 140/71     Height: 5' 4" (162.6 cm)  Weight: 44.5 kg (98 lb 1.7 oz)  Body mass index is 16.84 kg/m².    No intake or output data in the 24 hours ending 12/17/18 0800    Physical Exam   Musculoskeletal:   AIMS (12/12): 0   Psychiatric:   Mental Status Exam:  Arousal: alert  Sensorium/Orientation: grossly intact  Behavior/Cooperation: normal, friendly and cooperative   Psychomotor: unremarkable and within normal limits  Speech: conversational rate, tone, and volume  Language: answered questions appropriately  Mood: "good"   Affect: Appropriate and reactive to content  Thought Process: linear, logical  Thought Content:   Auditory hallucinations: NO  Visual hallucinations: NO  Paranoia: NO  Delusions:  NO  Suicidal ideation: NO  Homicidal ideation: NO  Attention/Concentration:  intact  able to focus  Memory: grossly intact     Insight: Intact  Judgment:Intact           Significant Labs: Labs:    No results found for: LITHIUM, PHENYTOIN, PHENOBARB, VALPROATE, CBMZ    CBC:   Recent Labs   Lab 12/11/18  0940   WBC 8.69   RBC 5.11   HGB 15.3   HCT 44.8      MCV 88   MCH 29.9   MCHC 34.2     CMP:   Recent Labs   Lab 12/11/18  0940   *   CALCIUM 10.6*   ALBUMIN 3.6   PROT 6.9   *   K 3.8   CO2 25   CL 96   BUN 7*   CREATININE 1.0   ALKPHOS 93   ALT 25   AST 36   BILITOT 0.4     Labs within the past 24 hours have been reviewed.    Significant Imaging: None  "

## 2018-12-17 NOTE — PROGRESS NOTES
Group Psychotherapy (PhD/LCSW)    Site: [facility]    Clinical status of patient: Inpatient    Date: 12/17/2018    Group Focus: Life Skills    Length of service: 49748 - 35-40 minutes    Number of patients in attendance: 8    Referred by: Acute Psychiatry Unit Treatment Team    Target symptoms: Psychosis    Patient's response to treatment: Active Listening, Self-disclosure     Progress toward goals: Progressing slowly    Interval History:.Pt appeared alert and attentive in group. Pt participated appropriately when prompted in a group discussion of various forms of self-care (biological, psychological, social, spiritual) that could support resilience post-discharge from the [facility]. Emphasized the value of self-care practices on a daily basis. Pt noted that being able to be in nature (eg going camping) is an important form of self-care for her.       Diagnosis: Schizophrenia     Plan: Continue treatment on [facility]

## 2018-12-18 VITALS
DIASTOLIC BLOOD PRESSURE: 70 MMHG | TEMPERATURE: 98 F | HEIGHT: 64 IN | HEART RATE: 109 BPM | BODY MASS INDEX: 16.75 KG/M2 | OXYGEN SATURATION: 98 % | WEIGHT: 98.13 LBS | SYSTOLIC BLOOD PRESSURE: 157 MMHG | RESPIRATION RATE: 18 BRPM

## 2018-12-18 PROCEDURE — 25000003 PHARM REV CODE 250: Performed by: STUDENT IN AN ORGANIZED HEALTH CARE EDUCATION/TRAINING PROGRAM

## 2018-12-18 PROCEDURE — 25000003 PHARM REV CODE 250: Performed by: PSYCHIATRY & NEUROLOGY

## 2018-12-18 PROCEDURE — 90847 FAMILY PSYTX W/PT 50 MIN: CPT | Mod: ,,, | Performed by: PSYCHIATRY & NEUROLOGY

## 2018-12-18 PROCEDURE — 99239 HOSP IP/OBS DSCHRG MGMT >30: CPT | Mod: 25,,, | Performed by: PSYCHIATRY & NEUROLOGY

## 2018-12-18 RX ORDER — LISINOPRIL 10 MG/1
10 TABLET ORAL DAILY
Qty: 30 TABLET | Refills: 0 | Status: SHIPPED | OUTPATIENT
Start: 2018-12-19 | End: 2019-01-10 | Stop reason: SDUPTHER

## 2018-12-18 RX ORDER — AMLODIPINE BESYLATE 10 MG/1
10 TABLET ORAL DAILY
Qty: 30 TABLET | Refills: 0 | Status: SHIPPED | OUTPATIENT
Start: 2018-12-19 | End: 2019-02-21 | Stop reason: SDUPTHER

## 2018-12-18 RX ORDER — RISPERIDONE 2 MG/1
3 TABLET ORAL 2 TIMES DAILY
Qty: 90 TABLET | Refills: 0 | Status: SHIPPED | OUTPATIENT
Start: 2018-12-18 | End: 2018-12-21

## 2018-12-18 RX ORDER — GABAPENTIN 300 MG/1
600 CAPSULE ORAL NIGHTLY
Qty: 60 CAPSULE | Refills: 0 | Status: SHIPPED | OUTPATIENT
Start: 2018-12-18 | End: 2018-12-19 | Stop reason: SDUPTHER

## 2018-12-18 RX ADMIN — THERA TABS 1 TABLET: TAB at 09:12

## 2018-12-18 RX ADMIN — ASPIRIN 325 MG ORAL TABLET 325 MG: 325 PILL ORAL at 09:12

## 2018-12-18 RX ADMIN — RISPERIDONE 2 MG: 1 TABLET ORAL at 09:12

## 2018-12-18 RX ADMIN — VENLAFAXINE HYDROCHLORIDE 150 MG: 150 CAPSULE, EXTENDED RELEASE ORAL at 09:12

## 2018-12-18 RX ADMIN — ATORVASTATIN CALCIUM 40 MG: 20 TABLET, FILM COATED ORAL at 09:12

## 2018-12-18 RX ADMIN — LISINOPRIL 10 MG: 10 TABLET ORAL at 09:12

## 2018-12-18 RX ADMIN — AMLODIPINE BESYLATE 10 MG: 10 TABLET ORAL at 09:12

## 2018-12-18 RX ADMIN — FOLIC ACID 1 MG: 1 TABLET ORAL at 09:12

## 2018-12-18 NOTE — PLAN OF CARE
----- Message from Re Singh sent at 4/28/2017 11:23 AM CDT -----  Contact: [facility]  //[phone]  RHANNON  Calling about  New  Birth control , not  Available //please call   Problem: Adult Behavioral Health Plan of Care  Goal: Plan of Care Review  Outcome: Ongoing (interventions implemented as appropriate)  POC discussed with pt, calm and cooperative on the unit. Follows direction and attends group with minimal participation. Med compliant, good hygiene,and good appetite. Denies SI/HI/AVH, bright affect, thoughts are linear. Mood is good. Out visible on the unit. Safety plan reviewed and environmental rounds done. Reviewed medicine with pt will require further instruction. Pt given time to ask questions, all questions answered. MVC in place will continue to monitor.     Problem: Sleep Impairment (Anxiety Signs/Symptoms)  Goal: Improved Sleep Hygiene (Anxiety Signs/Symptoms)  Outcome: Ongoing (interventions implemented as appropriate)  Pt sleep has improved from admit. She appears to have slept 8 hours last night and got up well rested.     Problem: Mental State/Mood Impairment (Psychotic Signs/Symptoms)  Goal: Improved Mental State and Mood (Psychotic Signs/Symptoms)  Outcome: Ongoing (interventions implemented as appropriate)  Pt is calm and cooperative she is active on the unit. Pt tends to socialize with staff only but does attend group and participate.

## 2018-12-18 NOTE — NURSING
Pt slept 8 hours she remains anxious but cooperative. Pt denies AVH's but does appear to be paranoid. She is med compliant. Pt socially isolates during the evening. MVC in place will continue to monitor.

## 2018-12-18 NOTE — ASSESSMENT & PLAN NOTE
- PRN Nicotine patch never requested  Counseled on cessation, motivational interviewing applied.     Agreeable to referral for smoking cessation assistance

## 2018-12-18 NOTE — ASSESSMENT & PLAN NOTE
- Norvasc 10mg daily  - Lisinopril 10mg daily added 12/14/18  - Follow up with PCP for continued management

## 2018-12-18 NOTE — ASSESSMENT & PLAN NOTE
Reports being prescribed medication from outpatient neurologist, but cannot recall medication name.  - gabapentin 600mg qHS, which should also help with sleep, anxiety, and headache

## 2018-12-18 NOTE — NURSING
Pt is given detailed d/c instructions on medications and follow up appointments. She is given signed MD scripts.  Pt is told to notify friends/ family/911/ suicide hotline if symptoms return. Pt denies SI/HI/AV hallucinations. She is calm and compliant. Pt signs form verifying that she has received ALL of her belongings. She is d/c to home with daughter.

## 2018-12-18 NOTE — ASSESSMENT & PLAN NOTE
- Zanaflex 4mg Q8 PRN for headache (not required)  - Tylenol 650 mg PRN Q6 for headache    Gabapentin initiated 12/14/18, which should help with headaches

## 2018-12-18 NOTE — ASSESSMENT & PLAN NOTE
Bhavna Landry is a 63 y.o. female with a past psychiatric history of Paranoid Schizophrenia, who presented with AH.  Psychiatry was originally consulted to address the patient's symptoms of auditory hallucinations. Pt with long history of medication controlled paranoid schizophrenia, now due to inability to access previous med regimen, pt now presenting with paranoia and auditory hallucinations, in need of revamped med regimen in order to stabilize current psychotic symptomatology.     - Continued Effexor 150mg daily during hospitalization and at discharge  - Started on Haldol, but changed to Risperdal 2mg qHS for psychotic features. Risperdal increased to 3mg qHS on 12/12/18, 4mg qHS on 12/15, and 2mg BID on 12/16. Will discharge on 3mg BID.     PRN Medication(s):  - Haldol 5mg PO/IM q6hr PRN for non-redirectable agitation  - Ativan 2mg PO/IM q4hr PRN for non-redirectable agitation  - Benadryl 25mg TID PRN for breakthrough anxiety      Patient under good behavioral control on unit, showing attenuation of psychosis. Spoke with patient's daughter during family meeting on 12/18, and patient and daughter agreeable to discharge.

## 2018-12-18 NOTE — DISCHARGE SUMMARY
"[facility]  Psychiatry  Discharge Summary      Patient Name: Bhavna Landry  MRN: [id number]  Admission Date: 12/10/2018  Hospital Length of Stay: 8 days  Discharge Date and Time:  12/18/2018 11:23 AM  Attending Physician: Amador Arita MD   Discharging Provider: Zak Cosby MD  Primary Care Provider: Sadaf Vargas MD    HPI:   Bhavna Landry is a 63 y.o. female with a past psychiatric history of Paranoid Schizophrenia, currently presenting with .  Psychiatry was originally consulted to address the patient's symptoms of auditory hallucinations.     Per ED RN(s):  Pt ran out of her nevane 2 weeks ago for schizophrenia . Pt psychiatrist changed her med to abilify and symptoms worsened. Pt was recently changed to zyprexa for past 3 days with no improvement. Pt talking to herself and having hallucinations.      Per ED MD:  Patient is a 63 year old female with a pmhx of schizophrenia who presents to the ED for psychiatric evaluation. Patient recently d/c Navane which she has been on for over a decade because it was on back order. Her pscyhiatrist presribed her new medication, but patient and her family feels as if she is declining. Over the weekend, patient has been going back in forth in the house, slamming doors, talking to herself. She however denies any auditory or visual hallucinations, suicidal ideations, homicidal ideations. She presents to the ED with her daughter who she stays with  and who is concerned. Patient has a chronic cough, unchanged from baseline. No fever or chills. Smokes 10 cigs per day.     Per Psych MD:  Upon initiation of interview, pt was seated next to ED bed, dressed in casual clothing, in no apparent distress, daughter at bedside. Pt reports a long history of paranoid schizophrenia controlled on Navane, however, recently has been unable to fill said prescription, being told that said medication is no longer in production. As of late, pt has been "talking " "outloud," stating "don't do anything to my daughter, I dont want to hurt anybody, not my granddaughter, I wanna protect her from old people bc I dont want her to get gray hair." Additionally, pt states that she has been agitated due to feeling like "my head and body are being pulled apart." Pt follows with Dr. Poe, who has been utilizing different medications in an attempt to stabilize patient, on an outpt basis, having tried Abilify and Zyprexa, to no avail. Pt denies any other psychiatric symptomatology for depression (SIGECAPS), padmini (DIGFAST), suicidality or homicidality.     Psychiatric Review Of Systems - Is patient experiencing or having changes in:  sleep: no  appetite: no  weight: no  energy/anergy: no  interest/pleasure/anhedonia: no  somatic symptoms: no  guilty/hopelessness: no  concentration: no  S.I.B.s/risky behavior: no  SI/SA:  no     anxiety/panic: yes  Agoraphobia:  no  Social phobia:  no  Recurrent nightmares:  no  hyper startle response:  no  Avoidance: no  Recurrent thoughts:  no  Recurrent behaviors:  no     Irritability: yes  Racing thoughts: no  Impulsive behaviors: no  Pressured speech:  no     Paranoia:yes  Delusions: yes  AVH:yes     Medical Review Of Systems:  Pertinent items noted in HPI     Psychiatric History:  Diagnose(s): Yes - Paranoid Schizophrenia  Previous Medication Trials: Yes - Navane, Abilify, Zyprexa  Previous Psychiatric Hospitalizations: Yes - 4x (last 2000)  Previous Suicide Attempts: No  History of Violence: No  Outpatient Psychiatrist: Yes - Carol Ann Poe MD     Social History:  Marital Status:   Children: 1   Employment Status: retired  Education: high school diploma/GED  Special Ed: no   History: no  Housing Status: Yes - lives with daughter and grandchildren  Developmental History: No  History of Abuse: No  Access to Gun: Yes - but it is unloaded     Substance Abuse History:  Recreational Drugs: no  Use of Alcohol: no  Rehab History: " No  Tobacco Use: Yes - 10 per day  Use of Caffeine: Yes -  5 to 6 cups per day  Use of OTC: Yes - Benadryl  Legal consequences of chemical use: No  Is the patient aware of the biomedical complications associated with substance abuse and mental illness? No     Legal History:  Past Charges/Incarcerations: No  Pending Charges: No     Family Psychiatric History:   No     Collateral:   Yes - daughter at bedside, who brought pt to ED. She is very concerned, due to her mother being well controlled for years on Navane, and now with inability to access said medication, daughter worried for mother's safety as she has begun to hallucinate, regularly, as well as present agitated on multiple occasions      Psychosocial Stressors: health.   Functioning Relationships: good support system     Psychosocial Factors:  Maladaptive or problem behaviors: No  Peer group, social, ethic, cultural, emotional, and health factors: No  Living situation, family constellation, family circumstances/home: Yes - good support system  Recovery environment: Yes -   Community resources used by patient: No  Treatment acceptance/motivation for change: Yes -    Hospital Course:   12/11/2018  Seen for initial evaluation with treatment team. Patient with long history of schizophrenia treated with Navane (also depression on Effexor XR) who was in their usual state of health until she could not access the Navane due to a shortage at the pharmacy.  Off her neuroleptic she quickly decompensated.  Outpatient pscyhiatrist, Dr. Poe (who I spoke with today to discuss the case), attempted to restabilize her on an outpatient basis but she failed brief trials of Abilify and Zyprexa (only a few days in duration each).  She was initiated on Haldol last night but I spoke with Dr. Poe and will first initiate a Risperdal trial prior to moving to another typical neuroleptic.  Collateral obtained from daughter.    12/12/2018  Staff reports poor sleep (maybe 3 hours  "overnight) and that patient was changing clothes frequently. Staff denies patient with delusional content, but concerned for hypomania and intrusiveness. Patient seen and examined with treatment team; chart and nursing notes reviewed. No distress noted, and patient was agreeable and cooperative with interview. Patient states they are feeling "alright" this morning. Patient reports poor sleep sleep overnight, but feels it may be attributed to being a strange environment. Has a stable appetite. No somatic complaints today. Tolerating medications without adverse side effects. Denies SI, HI, AVH, delusions, or paranoia. Patient has been compliant with medications.    12/13/2018  Patient under good behavioral control. Bizarre affect at times, sometimes seen staring into the the nurses station. Patient denies any pain or discomfort. Guarded, but can be pleasant and interacts appropriately with staff and peers. Did not displayed any signs of padmini and did not expressed any delusional thoughts to staff yesterday. Attending groups, taking medications, and denies SI/HI/AV hallucinations. Reports improved sleep, and states she got ~5 hours overnight. Stable appetite. Denies any somatic complaints, but does express some concern for constipation. Patient does request medication to help with sleep and "nerves;" states Benadryl was beneficial in the past.    12/14/2018  Patient has not been a management problem. The patient is medication compliant; no PRN medications administered. Patient endorsed feeling anxious last night with poor sleep related to her anxiety. Staff reports that patient appeared guarded and more withdrawn in the evening hour; did not engage with her peers. No manic or disorganized behavior displayed. Her thought content remains appropriate. Daughter plans on coming to visit this weekend and is agreeable to family meeting next week. With treatment team, patient reports doing "better" today. Reports she routinely " "sleeps sitting up, but alternates with lying down. States she is feeling more like herself today and thinking clearly. Only complaint is Left leg cramps during the day and restless legs at night.    12/15/2018  Patient reports sleep improved though staff states she was still up and down and noted to be sleeping sitting up.  She states she feels like "the ground is moving under my feet" when she walks - had more difficulty characterizing.  Visitor feedback form from daughter reviewed, per daughter she may be guarded about delusional material in effort to secure discharge.  Patient does admit to some paranoia upon my interview but is hesitant to discuss further.  She is amenable to dose titration of risperdal to address.      12/16/2018  Patient's sleep appears to be improved.  She complains about what sounds like somatic delusions - "people are moving through my body".  She asks for medication in the morning - we discussed splitting her risperdal dose and she is amenable to this.  She denies SI/HI.  She states daughter visited.    12/17/2018  Per staff, patient slept 8 hours. She remains anxious but cooperative on the unit. Does attend groups, but with less participation in the evenings. Daughter visited over the weekend and reports patient "mostly" back to baseline. Med compliant, good hygiene,and good appetite. Denies SI/HI/AVH, bright affect, thoughts are linear. Denies any depression. Feels mood is "good." Denies any delusions or paranoia. Poor insight, and does not feel like she has schizophrenia, but acknowledges that medications are helpful. Some illogical statements involving hypnotizing herself and experiencing people moving through her body.    12/18/2018   Per staff, patient slept 8 hours. She remains anxious but cooperative. Denies AVH's but does appear to be paranoid. Patient socially isolates during the evening. Follows direction and attends group with minimal participation. Med compliant, good hygiene, " and good appetite. Family meeting held with treatment team, patient, and patient's daughter. Patient continued to endorse some delusional content regarding people moving in and out of her body; does state it is improving and that it occurs mainly in the middle of the day. Patient expressed desire to return home today with family and continue care on an outpatient basis. Able to contract for safety and verbalize a comprehensive safety plan. Agreeable to increase of medication. Denies any SI/HI, intent, or plan.     * No surgery found *     Consults:   Physical Exam   Musculoskeletal:   AIMS (12/12): Abnormal Involuntary Movement Scale  (0-4 point scale)     Facial and Oral movements   Muscles of Facial Expression (forehead, eyebrows, cheeks: blinking, smiling, frowning, etc.) 0  Lips and Perioral Area (puckering, pouting, smacking, etc.) 0  Jaw (biting, clinching, chewing, etc.) 0  Tongue (increases in movement in and out; NOT sustained movement) 0     Extremity Movements   Upper: arms, wrists, hands, fingers  (choreic:rapid, irregular) (athetoid: slow, irregular, serpintine) NOT tremor 0  Lower: legs, knees, ankles, toes (foot tapping, heel dropping, foot inversion/eversion, etc.) 0     Trunk Movements   Neck, shoulders, hips (rocking, twisting, pelvic gyrations, etc.) 0     Overall Severity   Severity of abnormal movements (highest score from questions above)  0  Incapacitation due to abnormal movements  0     Patient's awareness of abnormal movements (rate only patient's report; awareness+distress)  0     Dental Status (may aid in lip/jaw, tongue movements)   Current problems with teeth and/or dentures?  No   Does patient usually wear dentures?  No      A POSITIVE AIMS EXAMINATION IS A SCORE OF 2 IN TWO OR MORE MOVEMENTS or a SCORE OF 3 OR 4 IN A SINGLE MOVEMENT   Do not sum the scores: e.g. a patient who has scores 1 in four movements DOES NOT have a positive AIMS score of 4.    Psychiatric:   Mental Status  "Exam:  Arousal: alert  Sensorium/Orientation: person, place, situation, time/date  Behavior/Cooperation: normal, friendly and cooperative   Psychomotor: unremarkable and within normal limits  Speech: conversational rate, tone, and volume  Language: answered questions appropriately  Mood: "fine"   Affect: Appropriate and reactive to content  Thought Process: linear, logical  Thought Content:   Auditory hallucinations: NO  Visual hallucinations: NO  Paranoia: NO  Delusions:  YES: feels people passing through her (improving)     Suicidal ideation: NO  Homicidal ideation: NO  Attention/Concentration:  intact  able to focus  Memory: grossly intact     Insight: Intact  Judgment:Intact         Significant Labs:   TSH wnl  Hg A1c 5.3  CMP non-concerning  CBC wnl  UTox negative    Significant Imaging: None    Smoking Cessation:   Does the patient smoke? Yes  Does the patient want a prescription for Smoking Cessation? No  Does the patient want counseling for Smoking Cessation? Yes         Pending Diagnostic Studies:     None        Final Active Diagnoses:    Diagnosis Date Noted POA    PRINCIPAL PROBLEM:  Schizophrenia, chronic condition with acute exacerbation [F20.9] 05/28/2013 Yes     Chronic    Restless legs [G25.81] 12/14/2018 Yes    Tobacco use disorder [F17.200] 12/11/2018 Yes     Chronic    Migraine without aura and without status migrainosus, not intractable [G43.009] 07/25/2013 Yes     Chronic    Insomnia [G47.00] 05/28/2013 Yes    Essential hypertension [I10] 07/23/2012 Yes     Chronic    Hyperlipidemia [E78.5] 07/23/2012 Yes    Osteoarthritis [M19.90] 07/19/2012 Yes      Problems Resolved During this Admission:      * Schizophrenia, chronic condition with acute exacerbation    Bhavna Landry is a 63 y.o. female with a past psychiatric history of Paranoid Schizophrenia, who presented with AH.  Psychiatry was originally consulted to address the patient's symptoms of auditory hallucinations. Pt with long " history of medication controlled paranoid schizophrenia, now due to inability to access previous med regimen, pt now presenting with paranoia and auditory hallucinations, in need of revamped med regimen in order to stabilize current psychotic symptomatology.     - Continued Effexor 150mg daily during hospitalization and at discharge  - Started on Haldol, but changed to Risperdal 2mg qHS for psychotic features. Risperdal increased to 3mg qHS on 12/12/18, 4mg qHS on 12/15, and 2mg BID on 12/16. Will discharge on 3mg BID.     PRN Medication(s):  - Haldol 5mg PO/IM q6hr PRN for non-redirectable agitation  - Ativan 2mg PO/IM q4hr PRN for non-redirectable agitation  - Benadryl 25mg TID PRN for breakthrough anxiety      Patient under good behavioral control on unit, showing attenuation of psychosis. Spoke with patient's daughter during family meeting on 12/18, and patient and daughter agreeable to discharge.     Restless legs    Reports being prescribed medication from outpatient neurologist, but cannot recall medication name.  - gabapentin 600mg qHS, which should also help with sleep, anxiety, and headache     Tobacco use disorder    - PRN Nicotine patch never requested  Counseled on cessation, motivational interviewing applied.     Agreeable to referral for smoking cessation assistance     Migraine without aura and without status migrainosus, not intractable    - Zanaflex 4mg Q8 PRN for headache (not required)  - Tylenol 650 mg PRN Q6 for headache    Gabapentin initiated 12/14/18, which should help with headaches     Insomnia    - Benadryl 50mg qHS for insomnia, per patient request     Hyperlipidemia    - Atorvastatin 40 mg daily  -  mg daily      Essential hypertension    - Norvasc 10mg daily  - Lisinopril 10mg daily added 12/14/18  - Follow up with PCP for continued management     Osteoarthritis    - Tylenol 650 mg Q6 PRN for pain available         Functional Condition: Independent ambulation, Independent  communication skills, Can read/write, Able to access transportation , Independent with ADLs and basic life skills, Able to obtain/access community resources and Able to participate in aftercare arrangements independently    Discharged Condition: fair    Disposition: Home or Self Care    Follow Up:  Follow-up Information     [facility].    Specialty:  Emergency Medicine  Why:  As needed  Contact information:  [address] [facility] [id number] [phone]           Carol Ann Poe MD. Go on 12/19/2018.    Specialties:  Internal Medicine, Psychiatry  Why:  appointment scheduled for 10:00 AM  Contact information:  [address] [facility] [address]  [phone]                 Patient Instructions:      Ambulatory referral to Smoking Cessation Program   Referral Priority: Routine Referral Type: Consultation   Referral Reason: Specialty Services Required   Requested Specialty: CTTS   Number of Visits Requested: 1     Call MD for:   Order Comments: True SI, HI, AVH, or other changes to mental status     Medications:  Reconciled Home Medications:      Medication List      START taking these medications    gabapentin 300 MG capsule  Commonly known as:  NEURONTIN  Take 2 capsules (600 mg total) by mouth every evening.     lisinopril 10 MG tablet  Take 1 tablet (10 mg total) by mouth once daily.  Start taking on:  12/19/2018     risperiDONE 2 MG tablet  Commonly known as:  RISPERDAL  Take 1.5 tablets (3 mg total) by mouth 2 (two) times daily.        CHANGE how you take these medications    amLODIPine 10 MG tablet  Commonly known as:  NORVASC  Take 1 tablet (10 mg total) by mouth once daily.  Start taking on:  12/19/2018  What changed:    · medication strength  · how much to take  · Another medication with the same name was removed. Continue taking this medication, and follow the directions you see here.        CONTINUE taking these medications    albuterol 90  mcg/actuation inhaler  Commonly known as:  PROVENTIL/VENTOLIN HFA  Inhale 2 puffs into the lungs every 6 (six) hours as needed for Wheezing.     alendronate 70 MG tablet  Commonly known as:  FOSAMAX  Take 1 tablet (70 mg total) by mouth every 7 days.     aspirin 325 MG tablet  Take 325 mg by mouth once daily.     atorvastatin 40 MG tablet  Commonly known as:  LIPITOR  Take 1 tablet (40 mg total) by mouth once daily.     butalbital-acetaminophen-caffeine -40 mg -40 mg per tablet  Commonly known as:  FIORICET, ESGIC     multivitamin capsule  Take 1 capsule by mouth once daily.     venlafaxine 150 MG Cp24  Commonly known as:  EFFEXOR-XR  Take 1 capsule (150 mg total) by mouth once daily.        STOP taking these medications    benztropine 0.5 MG tablet  Commonly known as:  COGENTIN     haloperidol 5 MG tablet  Commonly known as:  HALDOL     OLANZapine 5 MG tablet  Commonly known as:  ZyPREXA     rOPINIRole 3 MG tablet  Commonly known as:  REQUIP     sumatriptan 50 MG tablet  Commonly known as:  IMITREX     tiZANidine 4 MG tablet  Commonly known as:  ZANAFLEX     topiramate 25 MG tablet  Commonly known as:  TOPAMAX     traMADol 50 mg tablet  Commonly known as:  ULTRAM          Is patient being discharged on multiple antipsychotics? No        Total time:45 minutes with greater than 50% of this time spent in counseling and/or coordination of care.     All elements of the transition record were discussed with the patient at discharge and patient agrees to this plan.    Zak Cosby MD  [facility]  PGY-4  12/18/2018

## 2018-12-19 ENCOUNTER — OFFICE VISIT (OUTPATIENT)
Dept: PSYCHIATRY | Facility: CLINIC | Age: 63
End: 2018-12-19
Payer: MEDICARE

## 2018-12-19 VITALS
HEIGHT: 64 IN | WEIGHT: 104.63 LBS | DIASTOLIC BLOOD PRESSURE: 58 MMHG | BODY MASS INDEX: 17.86 KG/M2 | SYSTOLIC BLOOD PRESSURE: 134 MMHG | HEART RATE: 84 BPM

## 2018-12-19 DIAGNOSIS — F33.42 RECURRENT MAJOR DEPRESSIVE DISORDER, IN FULL REMISSION: ICD-10-CM

## 2018-12-19 DIAGNOSIS — G25.81 RESTLESS LEGS: ICD-10-CM

## 2018-12-19 DIAGNOSIS — F20.9 SCHIZOPHRENIA, CHRONIC CONDITION WITH ACUTE EXACERBATION: Primary | Chronic | ICD-10-CM

## 2018-12-19 PROCEDURE — 1101F PT FALLS ASSESS-DOCD LE1/YR: CPT | Mod: CPTII,S$GLB,, | Performed by: INTERNAL MEDICINE

## 2018-12-19 PROCEDURE — 3008F BODY MASS INDEX DOCD: CPT | Mod: CPTII,S$GLB,, | Performed by: INTERNAL MEDICINE

## 2018-12-19 PROCEDURE — 99214 OFFICE O/P EST MOD 30 MIN: CPT | Mod: S$GLB,,, | Performed by: INTERNAL MEDICINE

## 2018-12-19 PROCEDURE — 3078F DIAST BP <80 MM HG: CPT | Mod: CPTII,S$GLB,, | Performed by: INTERNAL MEDICINE

## 2018-12-19 PROCEDURE — 99999 PR PBB SHADOW E&M-EST. PATIENT-LVL III: CPT | Mod: PBBFAC,,, | Performed by: INTERNAL MEDICINE

## 2018-12-19 PROCEDURE — 3075F SYST BP GE 130 - 139MM HG: CPT | Mod: CPTII,S$GLB,, | Performed by: INTERNAL MEDICINE

## 2018-12-19 RX ORDER — GABAPENTIN 300 MG/1
600 CAPSULE ORAL NIGHTLY
Qty: 60 CAPSULE | Refills: 5 | Status: SHIPPED | OUTPATIENT
Start: 2018-12-19 | End: 2019-02-21

## 2018-12-19 NOTE — PROGRESS NOTES
"OUTPATIENT PSYCHIATRY RETURN VISIT    ENCOUNTER DATE:  12/20/2018  SITE:  [facility]  LENGTH OF SESSION:  30 minutes    CHIEF COMPLAINT:  Paranoid    HISTORY OF PRESENTING ILLNESS:  Bhavna Landry is a 63 y.o. female with history of Paranoid Schizophrenia who presents for follow up appointment.  Patient was last seen in clinic on 12/7/18, at which time she was started on Abilify and then soon changed to Zyprexa.  She was recently hospitalized from 12/10/18 to 12/18/18 and discharged on Risperdal 3mg BID, Effexor 150mg daily, Neurontin 600mg qHS, and Benadryl 50mg qHS.  She presents today accompanied by her daughter.    History as told by patient and daughter:  Says hospital was "knowledgable."  Says she "moves" people (not physically but "emotionally" ).  Moved her brother in law mentally.  Daughter says patient reported being stressed in the hospital.  Thought she was there to make others well.  Felt others were "taking her health."  Discharged on Risperdal 3mg BID, which was just increased from 2mg BID last night for the first time.  Neurontin seems to be working well for restless leg.  Patient denies sleeping well but daughter says she is sleeping through the night.  Still with some paranoia.  Daughter feels she is able to converse normally.  Reports definite improvement but still not back to baseline.  Patient denies SI, HI, or AVH.    Medication side effects:  No  Medication compliance:  Yes    PSYCHIATRIC REVIEW OF SYSTEMS:  Trouble with sleep:  Patient reports, daughter denies  Appetite changes:  Denies  Weight changes:  Denies  Lack of energy:  Denies  Anhedonia:  Denies  Somatic symptoms:  Denies  Libido:  Denies  Anxiety/panic:  Denies  Guilty/hopeless:  Denies  Self-injurious behavior/risky behavior:  Denies  Any drugs:  Denies  Alcohol:  Denies    MEDICAL REVIEW OF SYSTEMS:  Complete review of systems performed covering Constitutional, Musculoskeletal, Neurologic.  All systems " negative except for that covered in HPI.    PAST PSYCHIATRIC, MEDICAL, AND SOCIAL HISTORY REVIEWED  The patient's past medical, family and social history have been reviewed and updated as appropriate within the electronic medical record - see encounter notes.    MEDICATIONS:    Current Outpatient Medications:     albuterol 90 mcg/actuation inhaler, Inhale 2 puffs into the lungs every 6 (six) hours as needed for Wheezing., Disp: 1 each, Rfl: 11    alendronate (FOSAMAX) 70 MG tablet, Take 1 tablet (70 mg total) by mouth every 7 days., Disp: 4 tablet, Rfl: 11    amLODIPine (NORVASC) 10 MG tablet, Take 1 tablet (10 mg total) by mouth once daily., Disp: 30 tablet, Rfl: 0    aspirin 325 MG tablet, Take 325 mg by mouth once daily., Disp: , Rfl:     atorvastatin (LIPITOR) 40 MG tablet, Take 1 tablet (40 mg total) by mouth once daily., Disp: 30 tablet, Rfl: 11    butalbital-acetaminophen-caffeine -40 mg (FIORICET, ESGIC) -40 mg per tablet, , Disp: , Rfl: 1    gabapentin (NEURONTIN) 300 MG capsule, Take 2 capsules (600 mg total) by mouth every evening., Disp: 60 capsule, Rfl: 5    lisinopril 10 MG tablet, Take 1 tablet (10 mg total) by mouth once daily., Disp: 30 tablet, Rfl: 0    multivitamin capsule, Take 1 capsule by mouth once daily., Disp: , Rfl:     risperiDONE (RISPERDAL) 2 MG tablet, Take 1.5 tablets (3 mg total) by mouth 2 (two) times daily., Disp: 90 tablet, Rfl: 0    venlafaxine (EFFEXOR-XR) 150 MG Cp24, Take 1 capsule (150 mg total) by mouth once daily., Disp: 30 capsule, Rfl: 11    ALLERGIES:  Review of patient's allergies indicates:   Allergen Reactions    Adhesive      Other reaction(s): Rash    Chloromycetin [chloramphenicol sod succinate] Hives    Etanercept      Other reaction(s): recurrent infections    Nickel sutures [surgical stainless steel]      Allergic contact dermatitis     PSYCHIATRIC EXAM:  Vitals:    12/19/18 0957   BP: (!) 134/58   Pulse: 84   Weight: 47.4 kg (104 lb  "9.7 oz)   Height: 5' 4" (1.626 m)     Appearance:  Well groomed, appearing healthy and of stated age  Behavior:  Cooperative, pleasant, no psychomotor agitation or retardation  Speech:  Normal rate, rhythm, prosody, and volume  Mood:  "Good"  Affect:  Blunted, smiling at times  Thought Process:  At times relatively logical and other times disorganized  Thought Content:  Negative for suicidal ideation, homicidal ideation.  Not responding to unseen others.  Some paranoia.   Associations:  Loose  Memory:  Poor  Level of Consciousness/Orientation:  Grossly intact  Fund of Knowledge:  Fair  Attention:  Good  Language:  Fluent, able to name abstract and concrete objects  Insight:  Poor due to severe mental illness  Judgment:  Intact  Psychomotor signs:  No involuntary movements or tremor  Gait:  Normal    RELEVANT LABS/STUDIES:    Lab Results   Component Value Date    WBC 8.69 12/11/2018    HGB 15.3 12/11/2018    HCT 44.8 12/11/2018    MCV 88 12/11/2018     12/11/2018     BMP  Lab Results   Component Value Date     (L) 12/11/2018    K 3.8 12/11/2018    CL 96 12/11/2018    CO2 25 12/11/2018    BUN 7 (L) 12/11/2018    CREATININE 1.0 12/11/2018    CALCIUM 10.6 (H) 12/11/2018    ANIONGAP 13 12/11/2018    ESTGFRAFRICA >60.0 12/11/2018    EGFRNONAA 59.3 (A) 12/11/2018     Lab Results   Component Value Date    ALT 25 12/11/2018    AST 36 12/11/2018    GGT 91 (H) 07/28/2014    ALKPHOS 93 12/11/2018    BILITOT 0.4 12/11/2018     Lab Results   Component Value Date    TSH 2.745 12/11/2018     Lab Results   Component Value Date    HGBA1C 5.3 12/11/2018       IMPRESSION:    Bhavna Landry is a 63 y.o. female with history of history of Paranoid Schizophrenia who presents for follow up appointment.    Status/Progress:  Based on the examination today, the patient's problem(s) is/are improving but still inadequately controlled.  New problems have not been presented today.     Risk Parameters:  Patient reports no suicidal " ideation  Patient reports no homicidal ideation  Patient reports no self-injurious behavior  Patient reports no violent behavior    DIAGNOSES:    ICD-10-CM ICD-9-CM   1. Schizophrenia, chronic condition with acute exacerbation F20.9 295.94   2. Recurrent major depressive disorder, in full remission F33.42 296.36   3. Restless legs G25.81 333.94     PLAN:  · Patient tolerating Risperdal 3mg BID very well.  She continues to be improving, however is not back at baseline.  Dose just increased from 2mg BID to 3mg BID last night, so will give her a few more days on this dose.  Will call daughter on Friday to decide about continuing 3mg BID versus increasing to 4mg BID.    · No EPS on interview today.  Continue Benadryl 50mg qHS.  · Continue Effexor 150mg daily for depression.  · Continue Neurontin 600mg qHS for restless leg.    · Discussed with patient and daughter informed consent, risks versus benefits, alternative treatments, side effect profile and the inherent unpredictability of individual responses to these treatments.  The patient and daughter express understanding of the above and display the capacity to agree with this current plan.    RETURN TO CLINIC:  Follow-up in about 2 months (around 2/19/2019).

## 2018-12-21 ENCOUNTER — PATIENT MESSAGE (OUTPATIENT)
Dept: PSYCHIATRY | Facility: CLINIC | Age: 63
End: 2018-12-21

## 2018-12-21 DIAGNOSIS — F20.9 SCHIZOPHRENIA, CHRONIC CONDITION WITH ACUTE EXACERBATION: Primary | Chronic | ICD-10-CM

## 2018-12-21 RX ORDER — RISPERIDONE 4 MG/1
4 TABLET ORAL 2 TIMES DAILY
Qty: 60 TABLET | Refills: 3 | Status: SHIPPED | OUTPATIENT
Start: 2018-12-21 | End: 2019-02-26 | Stop reason: SDUPTHER

## 2018-12-28 ENCOUNTER — TELEPHONE (OUTPATIENT)
Dept: OPHTHALMOLOGY | Facility: CLINIC | Age: 63
End: 2018-12-28

## 2018-12-28 ENCOUNTER — PROCEDURE VISIT (OUTPATIENT)
Dept: OPHTHALMOLOGY | Facility: CLINIC | Age: 63
End: 2018-12-28
Payer: MEDICARE

## 2018-12-28 VITALS — SYSTOLIC BLOOD PRESSURE: 158 MMHG | HEART RATE: 85 BPM | DIASTOLIC BLOOD PRESSURE: 83 MMHG

## 2018-12-28 DIAGNOSIS — H50.811 DUANE'S SYNDROME OF RIGHT EYE: ICD-10-CM

## 2018-12-28 DIAGNOSIS — H43.813 POSTERIOR VITREOUS DETACHMENT, BOTH EYES: ICD-10-CM

## 2018-12-28 DIAGNOSIS — H57.811 BROW PTOSIS, RIGHT: Primary | ICD-10-CM

## 2018-12-28 DIAGNOSIS — H33.311 RETINAL TEAR OF RIGHT EYE: ICD-10-CM

## 2018-12-28 DIAGNOSIS — H35.62 RETINAL HEMORRHAGE, LEFT: ICD-10-CM

## 2018-12-28 PROCEDURE — 99499 UNLISTED E&M SERVICE: CPT | Mod: S$GLB,,, | Performed by: OPHTHALMOLOGY

## 2018-12-28 RX ORDER — DIAZEPAM 5 MG/1
5 TABLET ORAL
Status: COMPLETED | OUTPATIENT
Start: 2018-12-28 | End: 2018-12-28

## 2018-12-28 RX ADMIN — DIAZEPAM 5 MG: 5 TABLET ORAL at 08:12

## 2018-12-28 NOTE — PROGRESS NOTES
HPI     Here for ptosis right   Procedure approved  Requesting valium  No aspirin    Last edited by Marisol Pickens on 12/28/2018  8:15 AM. (History)            Assessment /Plan     For exam results, see Encounter Report.    Brow ptosis, right  -     Cancel: Fix Eyelid Ptosis - OD - Right Eye    Retinal tear of right eye    Retinal hemorrhage, left    Duane's syndrome of right eye    Posterior vitreous detachment, both eyes    Other orders  -     diazePAM tablet 5 mg      Here for right brow ptosis repair. Patient given 5 mg valium prior to procedure.     The procedure was not performed as authorization was not obtained for the brow ptosis repair.    Will obtain authorization and have the patient return for her procedure.

## 2018-12-31 ENCOUNTER — TELEPHONE (OUTPATIENT)
Dept: OPHTHALMOLOGY | Facility: CLINIC | Age: 63
End: 2018-12-31

## 2019-01-04 ENCOUNTER — TELEPHONE (OUTPATIENT)
Dept: OPHTHALMOLOGY | Facility: CLINIC | Age: 64
End: 2019-01-04

## 2019-01-07 ENCOUNTER — TELEPHONE (OUTPATIENT)
Dept: INTERNAL MEDICINE | Facility: CLINIC | Age: 64
End: 2019-01-07

## 2019-01-07 RX ORDER — TRAMADOL HYDROCHLORIDE 50 MG/1
TABLET ORAL
Qty: 40 TABLET | Refills: 0 | Status: SHIPPED | OUTPATIENT
Start: 2019-01-07 | End: 2019-01-22 | Stop reason: SDUPTHER

## 2019-01-07 NOTE — TELEPHONE ENCOUNTER
"----- Message from Tamera Jaramillo sent at 1/7/2019  3:07 PM CST -----  Contact: Self/[phone]      ----- Message -----  From: Jackie Denton  Sent: 1/7/2019   3:02 PM  To: Alicia CASTANEDA Staff    Patient would like to get medical advice.    Symptoms (please be specific):  Back pain    How long has patient had these symptoms:  today    Pharmacy name and phone # (DON'T enter "on file" or "in chart"):  [facility] [phone] (Phone) or [phone] (Fax)  Any drug allergies:  No    Would you prefer a response via [employer]?:      Comments:  Patient requesting Tramadol   "

## 2019-01-07 NOTE — TELEPHONE ENCOUNTER
Pt called stating she is having lower back pain. Pt states this is something that she deals with normally and she gets shots for it but she doesn't want to go to doctor for a shot. Pt states PCP is aware of back pain as well, requesting tramadol for pain.  She rates Pain at a 7, it started this morning when she first woke up. She denies any other symptoms

## 2019-01-09 ENCOUNTER — TELEPHONE (OUTPATIENT)
Dept: INTERNAL MEDICINE | Facility: CLINIC | Age: 64
End: 2019-01-09

## 2019-01-09 RX ORDER — OSELTAMIVIR PHOSPHATE 75 MG/1
75 CAPSULE ORAL 2 TIMES DAILY
Qty: 10 CAPSULE | Refills: 0 | Status: SHIPPED | OUTPATIENT
Start: 2019-01-09 | End: 2019-01-14

## 2019-01-09 NOTE — TELEPHONE ENCOUNTER
"Spoke with pt, she states she has a sore throat, body aches, chills, low grade fever of 100. Pt stated she "feels like she has the flu" explained to pt it is est to be seen to because of symptoms she is having but pt states she has no transportation. Informed pt that in order for RX to be sent we would have to figure out what we are treating so appt is needed. Pt declined  She would like to know what can be sent in for her. Please advise   "

## 2019-01-09 NOTE — TELEPHONE ENCOUNTER
Discussed with pt.  May have the flu.  Had flu vax.  Unable to make it to clinic.  Will call in tamiflu

## 2019-01-09 NOTE — TELEPHONE ENCOUNTER
----- Message from Lorena Deshpande sent at 1/9/2019  2:18 PM CST -----  Contact: Patient [phone]  Patient would like to get medical advice.    Symptoms (please be specific):  Fever Low grade 100, sore throat and Dizziness upon standing.     How long has patient had these symptoms:  Yesterday     Pharmacy name and phone # [id number] - [place] - [address] AT [facility] [address] [phone] (Phone)  [phone] (Fax)      Any drug allergies:  none  Would you prefer a response via [employer]?:  No please call    Comments:    Please call and advise  Thank you

## 2019-01-09 NOTE — TELEPHONE ENCOUNTER
Pt is requesting advice, OTC medication or an RX to be called in. Pt c/o of dizziness when standing, no productive cough and a fever of 100. Pt stated that symptoms began yesterday

## 2019-01-10 DIAGNOSIS — F33.42 RECURRENT MAJOR DEPRESSIVE DISORDER, IN FULL REMISSION: ICD-10-CM

## 2019-01-10 RX ORDER — LISINOPRIL 10 MG/1
10 TABLET ORAL DAILY
Qty: 30 TABLET | Refills: 11 | Status: SHIPPED | OUTPATIENT
Start: 2019-01-10 | End: 2019-02-26

## 2019-01-10 RX ORDER — VENLAFAXINE HYDROCHLORIDE 150 MG/1
150 CAPSULE, EXTENDED RELEASE ORAL DAILY
Qty: 30 CAPSULE | Refills: 11 | Status: SHIPPED | OUTPATIENT
Start: 2019-01-10 | End: 2020-01-22 | Stop reason: SDUPTHER

## 2019-01-22 ENCOUNTER — TELEPHONE (OUTPATIENT)
Dept: INTERNAL MEDICINE | Facility: CLINIC | Age: 64
End: 2019-01-22

## 2019-01-22 DIAGNOSIS — M54.9 BACK PAIN, UNSPECIFIED BACK LOCATION, UNSPECIFIED BACK PAIN LATERALITY, UNSPECIFIED CHRONICITY: Primary | ICD-10-CM

## 2019-01-22 RX ORDER — TRAMADOL HYDROCHLORIDE 50 MG/1
TABLET ORAL
Qty: 40 TABLET | Refills: 0 | Status: SHIPPED | OUTPATIENT
Start: 2019-01-22 | End: 2019-02-07

## 2019-01-22 NOTE — TELEPHONE ENCOUNTER
----- Message from Lorena Deshpande sent at 1/22/2019 11:19 AM CST -----  Contact: Patient [phone]  RX request - refill or new RX.  Is this a refill or new RX: refill   RX name and strength: traMADol (ULTRAM) 50 mg tablet  Directions:   Is this a 30 day or 90 day RX:    Local pharmacy or mail order pharmacy:local    Pharmacy name and phone #[id number] - [place]  [facility] [address] AT [facility] [address] [phone] (Phone)  [phone] (Fax)       Comments:      Please call and advise  Thank you

## 2019-01-22 NOTE — TELEPHONE ENCOUNTER
----- Message from Lorena Deshpande sent at 1/22/2019 11:31 AM CST -----  Contact: patient [phone]  Patient would like to get a referral.  Does the patient already have the specialty clinic appointment scheduled:    If yes, what date is the appointment scheduled:   no  Referral to what specialty:  Pain Management  Reason (be specific):    Did you confirm the insurance currently in Epic is correct (important for a referral):    Does the patient want the referral with a specific physician:    Is the specialist an [facility]:    Comments:      ##Advise the patient that once the physician approves this either a nurse or the  will return their call##

## 2019-01-23 ENCOUNTER — TELEPHONE (OUTPATIENT)
Dept: INTERNAL MEDICINE | Facility: CLINIC | Age: 64
End: 2019-01-23

## 2019-01-23 ENCOUNTER — TELEPHONE (OUTPATIENT)
Dept: PAIN MEDICINE | Facility: CLINIC | Age: 64
End: 2019-01-23

## 2019-01-23 DIAGNOSIS — M54.9 BACK PAIN, UNSPECIFIED BACK LOCATION, UNSPECIFIED BACK PAIN LATERALITY, UNSPECIFIED CHRONICITY: Primary | ICD-10-CM

## 2019-01-23 RX ORDER — LIDOCAINE 50 MG/G
1 PATCH TOPICAL DAILY
Qty: 30 PATCH | Refills: 11 | Status: SHIPPED | OUTPATIENT
Start: 2019-01-23 | End: 2019-02-07

## 2019-01-23 NOTE — TELEPHONE ENCOUNTER
Spoke with pt regarding low back pain. Pt stated she would like to be scheduled for a procedure. I advise pt to f/u with Dr. Pineda to further advise. Pt is schedule to see Dr. Pineda 1/28/19 at 10:15 am. Pt verbalized understanding and confirmed appt date and time.

## 2019-01-23 NOTE — TELEPHONE ENCOUNTER
----- Message from Barbra Krueger sent at 1/23/2019  1:17 PM CST -----  Contact: Self [phone]  Patient is calling to schedule a procedure. Please advice

## 2019-01-28 ENCOUNTER — OFFICE VISIT (OUTPATIENT)
Dept: PAIN MEDICINE | Facility: CLINIC | Age: 64
End: 2019-01-28
Payer: MEDICARE

## 2019-01-28 ENCOUNTER — TELEPHONE (OUTPATIENT)
Dept: PAIN MEDICINE | Facility: CLINIC | Age: 64
End: 2019-01-28

## 2019-01-28 VITALS
SYSTOLIC BLOOD PRESSURE: 130 MMHG | WEIGHT: 105 LBS | BODY MASS INDEX: 18.02 KG/M2 | DIASTOLIC BLOOD PRESSURE: 75 MMHG | HEART RATE: 90 BPM

## 2019-01-28 DIAGNOSIS — M53.3 SACROILIAC JOINT DYSFUNCTION: ICD-10-CM

## 2019-01-28 DIAGNOSIS — M46.1 BILATERAL SACROILIITIS: Primary | ICD-10-CM

## 2019-01-28 DIAGNOSIS — G89.4 CHRONIC PAIN SYNDROME: Primary | ICD-10-CM

## 2019-01-28 PROCEDURE — 99214 PR OFFICE/OUTPT VISIT, EST, LEVL IV, 30-39 MIN: ICD-10-PCS | Mod: S$GLB,,, | Performed by: PAIN MEDICINE

## 2019-01-28 PROCEDURE — 3078F PR MOST RECENT DIASTOLIC BLOOD PRESSURE < 80 MM HG: ICD-10-PCS | Mod: CPTII,S$GLB,, | Performed by: PAIN MEDICINE

## 2019-01-28 PROCEDURE — 1101F PR PT FALLS ASSESS DOC 0-1 FALLS W/OUT INJ PAST YR: ICD-10-PCS | Mod: CPTII,S$GLB,, | Performed by: PAIN MEDICINE

## 2019-01-28 PROCEDURE — 99214 OFFICE O/P EST MOD 30 MIN: CPT | Mod: S$GLB,,, | Performed by: PAIN MEDICINE

## 2019-01-28 PROCEDURE — 3008F PR BODY MASS INDEX (BMI) DOCUMENTED: ICD-10-PCS | Mod: CPTII,S$GLB,, | Performed by: PAIN MEDICINE

## 2019-01-28 PROCEDURE — 3075F PR MOST RECENT SYSTOLIC BLOOD PRESS GE 130-139MM HG: ICD-10-PCS | Mod: CPTII,S$GLB,, | Performed by: PAIN MEDICINE

## 2019-01-28 PROCEDURE — 3078F DIAST BP <80 MM HG: CPT | Mod: CPTII,S$GLB,, | Performed by: PAIN MEDICINE

## 2019-01-28 PROCEDURE — 1101F PT FALLS ASSESS-DOCD LE1/YR: CPT | Mod: CPTII,S$GLB,, | Performed by: PAIN MEDICINE

## 2019-01-28 PROCEDURE — 3008F BODY MASS INDEX DOCD: CPT | Mod: CPTII,S$GLB,, | Performed by: PAIN MEDICINE

## 2019-01-28 PROCEDURE — 3075F SYST BP GE 130 - 139MM HG: CPT | Mod: CPTII,S$GLB,, | Performed by: PAIN MEDICINE

## 2019-01-28 PROCEDURE — 99999 PR PBB SHADOW E&M-EST. PATIENT-LVL III: ICD-10-PCS | Mod: PBBFAC,,, | Performed by: PAIN MEDICINE

## 2019-01-28 PROCEDURE — 99999 PR PBB SHADOW E&M-EST. PATIENT-LVL III: CPT | Mod: PBBFAC,,, | Performed by: PAIN MEDICINE

## 2019-01-28 NOTE — PROGRESS NOTES
[facility] Established Patient Evaluation    Referred by: Sadaf Vargas MD  Reason for referral:M54.9,G89.29 (ICD-10-CM) - Chronic right-sided back pain, unspecified back location    CC:   Chief Complaint   Patient presents with    Hip Pain    Low-back Pain       Last 3 PDI Scores 1/28/2019 9/7/2018 8/13/2018   Pain Disability Index (PDI) 29 32 42     Interval Update:   1/29/19 - Pt returns for follow up she reports 8/10 low back  bilateral hip pain.  She reports return of her low back pain in the area of the SI joint 1-2 weeks ago.  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    9/7/18 - Pt returns to clinic reporting severely exacerbated low back pain bilaterally following the last session of physical therapy.  She endorses sharp, achy, stabbing pain in the areas of the SI joints and would like to repeat the SI injection she received a few weeks ago.  She reported 100% relief of lbp following the SI injection that last up until a few days ago when she experienced the exacerbation.  Overall, her PDI is improved which shows progress.    Background:  Bhavna Landry is a 63 y.o. female referred for right sided back pain to right upper buttocks.  She points to the middle of the right buttock 1-2 inches to the right of midline.    Location: back  Severity: Currently: 7/10   Typical Range: 7/10     Exacerbation: 7/10   Onset: 3-4 months  Quality: Burning and constant  Radiation: none  Axial/Extremity Percentage of Pain: 100%  Exacerbating Factors: prolonged sitting, standing, rolling over in bed  Mitigating Factors: aleve  Assoc: denies night fever/night sweats, urinary incontinence, bowel incontinence, significant weight loss, significant motor weakness and loss of sensations    Previous Therapies:  PT:  Scheduled to start this coming Friday 8/17/18  HEP:  tries  TENS: none  Injections: Low Back 10-15 yrs ago, with relief - unsure of which injection she had  Surgery:    Right TKA and  revision due to nickel allergy  Medications:   - NSAIDS: Yes  - MSK Relaxants: Yes, tizanidine  - TCAs:   - SNRIs: Yes  - Topicals:   - Anticonvulsants:  - Opioids: Yes    Current Pain Medications:  1. Aleve   2. Tramadol  3. Tylenol  4. Effexor    Full Medication List:    Current Outpatient Medications:     amLODIPine (NORVASC) 10 MG tablet, Take 1 tablet (10 mg total) by mouth once daily., Disp: 30 tablet, Rfl: 0    aspirin 325 MG tablet, Take 325 mg by mouth once daily., Disp: , Rfl:     atorvastatin (LIPITOR) 40 MG tablet, Take 1 tablet (40 mg total) by mouth once daily., Disp: 30 tablet, Rfl: 11    gabapentin (NEURONTIN) 300 MG capsule, Take 2 capsules (600 mg total) by mouth every evening., Disp: 60 capsule, Rfl: 5    risperiDONE (RISPERDAL) 4 MG tablet, Take 1 tablet (4 mg total) by mouth 2 (two) times daily., Disp: 60 tablet, Rfl: 3    traMADol (ULTRAM) 50 mg tablet, 1-2 tabs po q 6-8 h prn pain, Disp: 40 tablet, Rfl: 0    venlafaxine (EFFEXOR-XR) 150 MG Cp24, Take 1 capsule (150 mg total) by mouth once daily., Disp: 30 capsule, Rfl: 11    lidocaine (LIDODERM) 5 %, Place 1 patch onto the skin once daily. Remove & Discard patch within 12 hours or as directed by MD, Disp: 30 patch, Rfl: 11    lisinopril 10 MG tablet, Take 1 tablet (10 mg total) by mouth once daily., Disp: 30 tablet, Rfl: 11    multivitamin capsule, Take 1 capsule by mouth once daily., Disp: , Rfl:      Review of Systems:  Review of Systems   Constitutional: Negative for chills and fever.   HENT: Negative for nosebleeds.    Eyes: Negative for pain.   Respiratory: Negative for hemoptysis.    Cardiovascular: Negative for chest pain.   Gastrointestinal: Negative for nausea and vomiting.   Genitourinary: Negative for dysuria.   Musculoskeletal: Positive for back pain and joint pain. Negative for falls.   Skin: Negative for rash.   Neurological: Negative for tingling and focal weakness.   Endo/Heme/Allergies: Does not bruise/bleed easily.    Psychiatric/Behavioral: Negative for depression. The patient is not nervous/anxious.        Allergies:  Adhesive; Chloromycetin [chloramphenicol sod succinate]; Etanercept; and Nickel sutures [surgical stainless steel]     Medical History:  Past Medical History:   Diagnosis Date    Allergy     Amblyopia     Anemia     Arthritis 1992    Cataract     Depression     Dry eyes     Dry mouth     Fibromyalgia 2014    Fibromyalgia     GERD (gastroesophageal reflux disease)     History of psychiatric hospitalization     Hyperlipidemia 1992    Hypertension     Kidney stone     Migraine headache     Osteoporosis     Psoriatic arthritis 1992    Right knee pain     post knee replacement surgery (possible rejectiion of metal)    RLS (restless legs syndrome)     Schizophrenia 1992    stable on meds    Urinary tract infection         Surgical History:  Past Surgical History:   Procedure Laterality Date    ARTHROPLASTY, KNEE, TOTAL Right 2013    [facility]formed [facility] at [facility] OR 2ND FLR    BLOCK, SACROILIAC JOINT-BILATERAL Bilateral 2018    Performed by Michelle [facility]a Jr., [facility] at [facility] PAIN MGT    BLOCK, SACROILIAC JOINT-Right- ORAL SEDATION Right 2018    Performed by [facility] Jr[facility] at [facility] PAIN [facility]GT     SECTION      COLONOSCOPY N/A 2016    Performed by ANDRIA Connelly MD at [facility] ENDO (4TH FLR)    COLONOSCOPY N/A 2013    Performed by Didier Poole MD at [facility] ENDO (4TH FLR)    cyst removed from right sinus  1982    GRAFT-FEMORAL AUTOLOGOUS BONE GRAFTING Right 2014    Performed by [facility] at [facility] OR    HYSTERECTOMY      VAGINAL HYSTERECTOMY WITHOUT BSO - ENDOMETRIOSIS    JOINT REPLACEMENT Right     knee    KNEE SURGERY      REVISION-ARTHROPLASTY-KNEE-TOTAL Right 2014    Performed by [facility] at [facility] OR    Surgery on right knee  1982    tumor removed from back  left side upper shoulder  2006        Social History:  Social History     Socioeconomic History    Marital status:      Spouse name: Not on file    Number of children: 1    Years of education: Not on file    Highest education level: Not on file   Social Needs    Financial resource strain: Not on file    Food insecurity - worry: Not on file    Food insecurity - inability: Not on file    Transportation needs - medical: Not on file    Transportation needs - non-medical: Not on file   Occupational History    Occupation: retired asst  [employer].     Employer: 2010   Tobacco Use    Smoking status: Former Smoker     Packs/day: 0.50     Years: 10.00     Pack years: 5.00     Types: Cigarettes     Last attempt to quit: 2012     Years since quittin.6    Smokeless tobacco: Former User    Tobacco comment: stopped smoking .     Substance and Sexual Activity    Alcohol use: No    Drug use: No    Sexual activity: No     Partners: Male     Birth control/protection: Post-menopausal   Other Topics Concern    Patient feels they ought to cut down on drinking/drug use Not Asked    Patient annoyed by others criticizing their drinking/drug use Not Asked    Patient has felt bad or guilty about drinking/drug use Not Asked    Patient has had a drink/used drugs as an eye opener in the AM Not Asked    Are you pregnant or think you may be? Not Asked    Breast-feeding Not Asked   Social History Narrative    Exercise:  Childcare.        Ett:  3 days a week       Physical Exam:  Vitals:    19 1026   BP: 130/75   Pulse: 90   Weight: 47.6 kg (105 lb)   PainSc:   8     General    Nursing note and vitals reviewed.  Constitutional: She is oriented to person, place, and time. She appears well-developed and well-nourished. No distress.   HENT:   Head: Normocephalic and atraumatic.   Nose: Nose normal.   Eyes: Conjunctivae and EOM are normal. Pupils are equal, round, and reactive to  light. Right eye exhibits no discharge. Left eye exhibits no discharge. No scleral icterus.   Neck: No JVD present.   Cardiovascular: Intact distal pulses.    Pulmonary/Chest: Effort normal. No respiratory distress.   Abdominal: She exhibits no distension.   Neurological: She is alert and oriented to person, place, and time. Coordination normal.   Psychiatric: She has a normal mood and affect. Her behavior is normal. Judgment and thought content normal.     General Musculoskeletal Exam   Gait: antalgic         Right Hip Exam   Right hip exam is normal.     Tenderness   The patient tender to palpation of the SI joint.    Tests   Pain w/ forced internal rotation (HELIO): present  Back (L-Spine & T-Spine) / Neck (C-Spine) Exam     Tenderness Right paramedian tenderness of the Sacrum.         Imagin18 - X-Ray Lumbar Spine Ap And Lateral   Narrative    EXAMINATION:  XR LUMBAR SPINE AP AND LATERAL    CLINICAL HISTORY:  Low back pain, >6wks conservative tx, persistent-progressive sx, surgical candidate;pain r low back;Dorsalgia, unspecified    TECHNIQUE:  AP, lateral and spot images were performed of the lumbar spine.    COMPARISON:  None    FINDINGS:  DJD and lumbar scoliosis.  The L1/L2 and the L2/L3 disc spaces are significantly narrowed.  No fracture, spondylolisthesis or bone destruction identified        Labs:  BMP  Lab Results   Component Value Date     (L) 2018    K 3.8 2018    CL 96 2018    CO2 25 2018    BUN 7 (L) 2018    CREATININE 1.0 2018    CALCIUM 10.6 (H) 2018    ANIONGAP 13 2018    ESTGFRAFRICA >60.0 2018    EGFRNONAA 59.3 (A) 2018     Lab Results   Component Value Date    ALT 25 2018    AST 36 2018    GGT 91 (H) 2014    ALKPHOS 93 2018    BILITOT 0.4 2018       Assessment:  Problem List Items Addressed This Visit     Sacroiliac joint dysfunction    Chronic pain - Primary          This is a 63-year-old  "female with chronic low back pain the past 4 months.  The pain is nonradiating and exacerbated by prolonged sitting, standing, rolling over in bed.  Her physical exam demonstrates a positive HELIO sign on the right.  The history and exam are consistent with right sacroiliac joint dysfunction.  The patient states pain limits her ability to accomplish some activities of daily living and I have recommended a right sacroiliac joint injection for diagnostic and therapeutic purposes.  This injection will also facilitate her participation in physical therapy which is scheduled to start this coming Friday, August 17th.    1/28/19 - Previous SI injection lasted 5 months and patient would like to repeat the injection, which is appropriate for helping to restore function.    Treatment Plan:   PT/OT/HEP: I requested that she return to PT after the injection even though she associates it with increasing her pain.  Procedures: BILATERAL SI injection x1  Medications:    - Patient requested a "stronger" opioid for her pain.  I declined advising her to maximize her use of heat/ice, tylenol, and rest.  Imaging: No additional imaging recommended at this time.  Labs: Reviewed.  Medications are appropriately dosed for current hepatorenal function.    Follow Up: RTC p injection    Michelle Pineda Jr, MD  Interventional Pain Medicine / Anesthesiology    Disclaimer: This note was partly generated using dictation software which may occasionally result in transcription errors.    "

## 2019-01-28 NOTE — H&P (VIEW-ONLY)
[facility] Established Patient Evaluation    Referred by: Sadaf Vargas MD  Reason for referral:M54.9,G89.29 (ICD-10-CM) - Chronic right-sided back pain, unspecified back location    CC:   Chief Complaint   Patient presents with    Hip Pain    Low-back Pain       Last 3 PDI Scores 1/28/2019 9/7/2018 8/13/2018   Pain Disability Index (PDI) 29 [phone] 42     Interval Update:   1/29/19 - Pt returns for follow up she reports 8/10 low back  bilateral hip pain.  She reports return of her low back pain in the area of the SI joint 1-2 weeks ago.  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    9/7/18 - Pt returns to clinic reporting severely exacerbated low back pain bilaterally following the last session of physical therapy.  She endorses sharp, achy, stabbing pain in the areas of the SI joints and would like to repeat the SI injection she received a few weeks ago.  She reported 100% relief of lbp following the SI injection that last up until a few days ago when she experienced the exacerbation.  Overall, her PDI is improved which shows progress.    Background:  Bhavna Landry is a 63 y.o. female referred for right sided back pain to right upper buttocks.  She points to the middle of the right buttock 1-2 inches to the right of midline.    Location: back  Severity: Currently: 7/10   Typical Range: 7/10     Exacerbation: 7/10   Onset: 3-4 months  Quality: Burning and constant  Radiation: none  Axial/Extremity Percentage of Pain: 100%  Exacerbating Factors: prolonged sitting, standing, rolling over in bed  Mitigating Factors: aleve  Assoc: denies night fever/night sweats, urinary incontinence, bowel incontinence, significant weight loss, significant motor weakness and loss of sensations    Previous Therapies:  PT:  Scheduled to start this coming Friday 8/17/18  HEP:  tries  TENS: none  Injections: Low Back 10-15 yrs ago, with relief - unsure of which injection she had  Surgery:    Right TKA and  revision due to nickel allergy  Medications:   - NSAIDS: Yes  - MSK Relaxants: Yes, tizanidine  - TCAs:   - SNRIs: Yes  - Topicals:   - Anticonvulsants:  - Opioids: Yes    Current Pain Medications:  1. Aleve   2. Tramadol  3. Tylenol  4. Effexor    Full Medication List:    Current Outpatient Medications:     amLODIPine (NORVASC) 10 MG tablet, Take 1 tablet (10 mg total) by mouth once daily., Disp: 30 tablet, Rfl: 0    aspirin 325 MG tablet, Take 325 mg by mouth once daily., Disp: , Rfl:     atorvastatin (LIPITOR) 40 MG tablet, Take 1 tablet (40 mg total) by mouth once daily., Disp: 30 tablet, Rfl: 11    gabapentin (NEURONTIN) 300 MG capsule, Take 2 capsules (600 mg total) by mouth every evening., Disp: 60 capsule, Rfl: 5    risperiDONE (RISPERDAL) 4 MG tablet, Take 1 tablet (4 mg total) by mouth 2 (two) times daily., Disp: 60 tablet, Rfl: 3    traMADol (ULTRAM) 50 mg tablet, 1-2 tabs po q 6-8 h prn pain, Disp: 40 tablet, Rfl: 0    venlafaxine (EFFEXOR-XR) 150 MG Cp24, Take 1 capsule (150 mg total) by mouth once daily., Disp: 30 capsule, Rfl: 11    lidocaine (LIDODERM) 5 %, Place 1 patch onto the skin once daily. Remove & Discard patch within 12 hours or as directed by MD, Disp: 30 patch, Rfl: 11    lisinopril 10 MG tablet, Take 1 tablet (10 mg total) by mouth once daily., Disp: 30 tablet, Rfl: 11    multivitamin capsule, Take 1 capsule by mouth once daily., Disp: , Rfl:      Review of Systems:  Review of Systems   Constitutional: Negative for chills and fever.   HENT: Negative for nosebleeds.    Eyes: Negative for pain.   Respiratory: Negative for hemoptysis.    Cardiovascular: Negative for chest pain.   Gastrointestinal: Negative for nausea and vomiting.   Genitourinary: Negative for dysuria.   Musculoskeletal: Positive for back pain and joint pain. Negative for falls.   Skin: Negative for rash.   Neurological: Negative for tingling and focal weakness.   Endo/Heme/Allergies: Does not bruise/bleed easily.    Psychiatric/Behavioral: Negative for depression. The patient is not nervous/anxious.        Allergies:  Adhesive; Chloromycetin [chloramphenicol sod succinate]; Etanercept; and Nickel sutures [surgical stainless steel]     Medical History:  Past Medical History:   Diagnosis Date    Allergy     Amblyopia     Anemia     Arthritis 1992    Cataract     Depression     Dry eyes     Dry mouth     Fibromyalgia 2014    Fibromyalgia     GERD (gastroesophageal reflux disease)     History of psychiatric hospitalization     Hyperlipidemia 1992    Hypertension     Kidney stone     Migraine headache     Osteoporosis     Psoriatic arthritis 1992    Right knee pain     post knee replacement surgery (possible rejectiion of metal)    RLS (restless legs syndrome)     Schizophrenia 1992    stable on meds    Urinary tract infection         Surgical History:  Past Surgical History:   Procedure Laterality Date    ARTHROPLASTY, KNEE, TOTAL Right 2013    Performed [facility] at [facility] OR 2ND FLR    BLOCK, SACROILIAC JOINT-BILATERAL Bilateral 2018    Performed by Michelle Pineda Jr., MD at [facility]    BLOCK, SACROILIAC JOINT-Right- ORAL SEDATION Right 2018    Performed by Michelle Pineda Jr., MD at [facility]     SECTION      COLONOSCOPY N/A 2016    Performed by ANDRIA Connelly MD at [facility] ENDO (4TH FLR)    COLONOSCOPY N/A 2013    Performed by Didier Poole MD at [facility] ENDO (4TH FLR)    cyst removed from right sinus  1982    GRAFT-FEMORAL AUTOLOGOUS BONE GRAFTING Right 2014    Performed [facility] at [facility] OR    HYSTERECTOMY      VAGINAL HYSTERECTOMY WITHOUT BSO - ENDOMETRIOSIS    JOINT REPLACEMENT Right     knee    KNEE SURGERY      REVISION-ARTHROPLASTY-KNEE-TOTAL Right 2014    Performed by Stone Gonzalez MD at [facility] OR    Surgery on right knee  1982    tumor removed from back  left side upper shoulder  2006        Social History:  Social History     Socioeconomic History    Marital status:      Spouse name: Not on file    Number of children: 1    Years of education: Not on file    Highest education level: Not on file   Social Needs    Financial resource strain: Not on file    Food insecurity - worry: Not on file    Food insecurity - inability: Not on file    Transportation needs - medical: Not on file    Transportation needs - non-medical: Not on file   Occupational History    Occupation: retired asst  [employer] [address]     Employer: 2010   Tobacco Use    Smoking status: Former Smoker     Packs/day: 0.50     Years: 10.00     Pack years: 5.00     Types: Cigarettes     Last attempt to quit: 2012     Years since quittin.6    Smokeless tobacco: Former User    Tobacco comment: stopped smoking .     Substance and Sexual Activity    Alcohol use: No    Drug use: No    Sexual activity: No     Partners: Male     Birth control/protection: Post-menopausal   Other Topics Concern    Patient feels they ought to cut down on drinking/drug use Not Asked    Patient annoyed by others criticizing their drinking/drug use Not Asked    Patient has felt bad or guilty about drinking/drug use Not Asked    Patient has had a drink/used drugs as an eye opener in the AM Not Asked    Are you pregnant or think you may be? Not Asked    Breast-feeding Not Asked   Social History Narrative    Exercise:  Childcare.        Ett:  3 days a week       Physical Exam:  Vitals:    19 1026   BP: 130/75   Pulse: 90   Weight: 47.6 kg (105 lb)   PainSc:   8     General    Nursing note and vitals reviewed.  Constitutional: She is oriented to person, place, and time. She appears well-developed and well-nourished. No distress.   HENT:   Head: Normocephalic and atraumatic.   Nose: Nose normal.   Eyes: Conjunctivae and EOM are normal. Pupils are equal, round, and reactive to  light. Right eye exhibits no discharge. Left eye exhibits no discharge. No scleral icterus.   Neck: No JVD present.   Cardiovascular: Intact distal pulses.    Pulmonary/Chest: Effort normal. No respiratory distress.   Abdominal: She exhibits no distension.   Neurological: She is alert and oriented to person, place, and time. Coordination normal.   Psychiatric: She has a normal mood and affect. Her behavior is normal. Judgment and thought content normal.     General Musculoskeletal Exam   Gait: antalgic         Right Hip Exam   Right hip exam is normal.     Tenderness   The patient tender to palpation of the SI joint.    Tests   Pain w/ forced internal rotation (HELIO): present  Back (L-Spine & T-Spine) / Neck (C-Spine) Exam     Tenderness Right paramedian tenderness of the Sacrum.         Imagin18 - X-Ray Lumbar Spine Ap And Lateral   Narrative    EXAMINATION:  XR LUMBAR SPINE AP AND LATERAL    CLINICAL HISTORY:  Low back pain, >6wks conservative tx, persistent-progressive sx, surgical candidate;pain r low back;Dorsalgia, unspecified    TECHNIQUE:  AP, lateral and spot images were performed of the lumbar spine.    COMPARISON:  None    FINDINGS:  DJD and lumbar scoliosis.  The L1/L2 and the L2/L3 disc spaces are significantly narrowed.  No fracture, spondylolisthesis or bone destruction identified        Labs:  BMP  Lab Results   Component Value Date     (L) 2018    K 3.8 2018    CL 96 2018    CO2 25 2018    BUN 7 (L) 2018    CREATININE 1.0 2018    CALCIUM 10.6 (H) 2018    ANIONGAP 13 2018    ESTGFRAFRICA >60.0 2018    EGFRNONAA 59.3 (A) 2018     Lab Results   Component Value Date    ALT 25 2018    AST 36 2018    GGT 91 (H) 2014    ALKPHOS 93 2018    BILITOT 0.4 2018       Assessment:  Problem List Items Addressed This Visit     Sacroiliac joint dysfunction    Chronic pain - Primary          This is a 63-year-old  "female with chronic low back pain the past 4 months.  The pain is nonradiating and exacerbated by prolonged sitting, standing, rolling over in bed.  Her physical exam demonstrates a positive HELIO sign on the right.  The history and exam are consistent with right sacroiliac joint dysfunction.  The patient states pain limits her ability to accomplish some activities of daily living and I have recommended a right sacroiliac joint injection for diagnostic and therapeutic purposes.  This injection will also facilitate her participation in physical therapy which is scheduled to start this coming Friday, August 17th.    1/28/19 - Previous SI injection lasted 5 months and patient would like to repeat the injection, which is appropriate for helping to restore function.    Treatment Plan:   PT/OT/HEP: I requested that she return to PT after the injection even though she associates it with increasing her pain.  Procedures: BILATERAL SI injection x1  Medications:    - Patient requested a "stronger" opioid for her pain.  I declined advising her to maximize her use of heat/ice, tylenol, and rest.  Imaging: No additional imaging recommended at this time.  Labs: Reviewed.  Medications are appropriately dosed for current hepatorenal function.    Follow Up: RTC p injection    Michelle Pineda Jr, MD  Interventional Pain Medicine / Anesthesiology    Disclaimer: This note was partly generated using dictation software which may occasionally result in transcription errors.    "

## 2019-02-04 NOTE — DISCHARGE INSTRUCTIONS
Home Care Instructions Pain Management:    1.  DIET:    You may resume your normal diet today.    2.  BATHING:    You may shower with luke warm water.    3.  DRESSING:    You may remove your bandage today.    4.  ACTIVITY LEVEL:      You may resume your normal activities 24 hours after your procedure.    5.  MEDICATIONS:    You may resume your normal medications today.    6.  SPECIAL INSTRUCTIONS:    No heat to the injection site for 24 hours including bath or shower, heating pad, moist heat or hot tubs.    Use an ice pack to the injection site for any pain or discomfort.  Apply ice packs for 20 minute intervals as needed.    If you have received any sedatives by mouth today, you can not drive for 12 hours.    If you have received sedation through an IV, you can not drive for 24 hours.    PLEASE CALL YOUR DOCTOR FOR THE FOLLOWIN.  Redness or swelling around the injection site.  2.  Fever of 101 degrees.  3.  Drainage (pus) from the injection site.  4.  For any continuous bleeding (some dried blood over the incision is normal.)    FOR EMERGENCIES:    If any unusual problems or difficulties occur during clinic hours, call [phone] or dial [phone].    Follow up with with your physician in 2-3 weeks.

## 2019-02-06 ENCOUNTER — OFFICE VISIT (OUTPATIENT)
Dept: URGENT CARE | Facility: CLINIC | Age: 64
End: 2019-02-06
Payer: MEDICARE

## 2019-02-06 VITALS
SYSTOLIC BLOOD PRESSURE: 130 MMHG | RESPIRATION RATE: 14 BRPM | WEIGHT: 105 LBS | OXYGEN SATURATION: 98 % | HEART RATE: 105 BPM | TEMPERATURE: 99 F | HEIGHT: 64 IN | BODY MASS INDEX: 17.93 KG/M2 | DIASTOLIC BLOOD PRESSURE: 78 MMHG

## 2019-02-06 DIAGNOSIS — J02.9 SORE THROAT: ICD-10-CM

## 2019-02-06 DIAGNOSIS — M79.642 LEFT HAND PAIN: ICD-10-CM

## 2019-02-06 DIAGNOSIS — J06.9 UPPER RESPIRATORY INFECTION, VIRAL: Primary | ICD-10-CM

## 2019-02-06 DIAGNOSIS — M19.042 OSTEOARTHRITIS OF LEFT HAND, UNSPECIFIED OSTEOARTHRITIS TYPE: ICD-10-CM

## 2019-02-06 LAB
CTP QC/QA: YES
S PYO RRNA THROAT QL PROBE: NEGATIVE

## 2019-02-06 PROCEDURE — 87880 STREP A ASSAY W/OPTIC: CPT | Mod: QW,S$GLB,, | Performed by: NURSE PRACTITIONER

## 2019-02-06 PROCEDURE — 3078F DIAST BP <80 MM HG: CPT | Mod: CPTII,S$GLB,, | Performed by: NURSE PRACTITIONER

## 2019-02-06 PROCEDURE — 1101F PT FALLS ASSESS-DOCD LE1/YR: CPT | Mod: CPTII,S$GLB,, | Performed by: NURSE PRACTITIONER

## 2019-02-06 PROCEDURE — 1101F PR PT FALLS ASSESS DOC 0-1 FALLS W/OUT INJ PAST YR: ICD-10-PCS | Mod: CPTII,S$GLB,, | Performed by: NURSE PRACTITIONER

## 2019-02-06 PROCEDURE — 3075F PR MOST RECENT SYSTOLIC BLOOD PRESS GE 130-139MM HG: ICD-10-PCS | Mod: CPTII,S$GLB,, | Performed by: NURSE PRACTITIONER

## 2019-02-06 PROCEDURE — 96372 THER/PROPH/DIAG INJ SC/IM: CPT | Mod: S$GLB,,, | Performed by: NURSE PRACTITIONER

## 2019-02-06 PROCEDURE — 73130 X-RAY EXAM OF HAND: CPT | Mod: FY,LT,S$GLB, | Performed by: RADIOLOGY

## 2019-02-06 PROCEDURE — 87880 POCT RAPID STREP A: ICD-10-PCS | Mod: QW,S$GLB,, | Performed by: NURSE PRACTITIONER

## 2019-02-06 PROCEDURE — 99214 PR OFFICE/OUTPT VISIT, EST, LEVL IV, 30-39 MIN: ICD-10-PCS | Mod: 25,S$GLB,, | Performed by: NURSE PRACTITIONER

## 2019-02-06 PROCEDURE — 3078F PR MOST RECENT DIASTOLIC BLOOD PRESSURE < 80 MM HG: ICD-10-PCS | Mod: CPTII,S$GLB,, | Performed by: NURSE PRACTITIONER

## 2019-02-06 PROCEDURE — 73130 XR HAND COMPLETE 3 VIEW LEFT: ICD-10-PCS | Mod: FY,LT,S$GLB, | Performed by: RADIOLOGY

## 2019-02-06 PROCEDURE — 96372 PR INJECTION,THERAP/PROPH/DIAG2ST, IM OR SUBCUT: ICD-10-PCS | Mod: S$GLB,,, | Performed by: NURSE PRACTITIONER

## 2019-02-06 PROCEDURE — 99214 OFFICE O/P EST MOD 30 MIN: CPT | Mod: 25,S$GLB,, | Performed by: NURSE PRACTITIONER

## 2019-02-06 PROCEDURE — 3008F PR BODY MASS INDEX (BMI) DOCUMENTED: ICD-10-PCS | Mod: CPTII,S$GLB,, | Performed by: NURSE PRACTITIONER

## 2019-02-06 PROCEDURE — 3008F BODY MASS INDEX DOCD: CPT | Mod: CPTII,S$GLB,, | Performed by: NURSE PRACTITIONER

## 2019-02-06 PROCEDURE — 3075F SYST BP GE 130 - 139MM HG: CPT | Mod: CPTII,S$GLB,, | Performed by: NURSE PRACTITIONER

## 2019-02-06 RX ORDER — PREDNISONE 20 MG/1
20 TABLET ORAL DAILY
Qty: 5 TABLET | Refills: 0 | Status: SHIPPED | OUTPATIENT
Start: 2019-02-06 | End: 2019-02-07

## 2019-02-06 RX ORDER — KETOROLAC TROMETHAMINE 30 MG/ML
30 INJECTION, SOLUTION INTRAMUSCULAR; INTRAVENOUS
Status: COMPLETED | OUTPATIENT
Start: 2019-02-06 | End: 2019-02-06

## 2019-02-06 RX ADMIN — KETOROLAC TROMETHAMINE 30 MG: 30 INJECTION, SOLUTION INTRAMUSCULAR; INTRAVENOUS at 02:02

## 2019-02-06 NOTE — PLAN OF CARE
Left message with pt regarding arrival time, advised to arrive at 945. Advised npo at midnight and hold asa and dm medication. Advised must have a ride to and from procedure.

## 2019-02-06 NOTE — PATIENT INSTRUCTIONS
Use an antihistamine such as Claritin, Zyrtec or Allegra to dry you out.     Use Flonase 1-2 sprays/nostril per day. It is a local acting steroid nasal spray, if you develop a bloody nose, stop using the medication immediately. Warm tea with honey.     Use warm salt water gargles to ease your throat pain. Warm salt water gargles as needed for sore throat-  1/2 tsp salt to 1 cup warm water, gargle as desired.    Sometimes Nyquil at night is beneficial to help you get some rest, however it is sedating and it does have an antihistamine, and tylenol.      For hand pain. Rest, ice, compress, and elevate.  Take NSAIDs or Tylenol as needed for pain. Patient to follow up with primary if symptoms persist or worsen.      Viral Upper Respiratory Illness (Adult)  You have a viral upper respiratory illness (URI), which is another term for the common cold. This illness is contagious during the first few days. It is spread through the air by coughing and sneezing. It may also be spread by direct contact (touching the sick person and then touching your own eyes, nose, or mouth). Frequent handwashing will decrease risk of spread. Most viral illnesses go away within 7 to 10 days with rest and simple home remedies. Sometimes the illness may last for several weeks. Antibiotics will not kill a virus, and they are generally not prescribed for this condition.    Home care  · If symptoms are severe, rest at home for the first 2 to 3 days. When you resume activity, don't let yourself get too tired.  · Avoid being exposed to cigarette smoke (yours or others).  · You may use acetaminophen or ibuprofen to control pain and fever, unless another medicine was prescribed. (Note: If you have chronic liver or kidney disease, have ever had a stomach ulcer or gastrointestinal bleeding, or are taking blood-thinning medicines, talk with your healthcare provider before using these medicines.) Aspirin should never be given to anyone under 18 years of age  who is ill with a viral infection or fever. It may cause severe liver or brain damage.  · Your appetite may be poor, so a light diet is fine. Avoid dehydration by drinking 6 to 8 glasses of fluids per day (water, soft drinks, juices, tea, or soup). Extra fluids will help loosen secretions in the nose and lungs.  · Over-the-counter cold medicines will not shorten the length of time youre sick, but they may be helpful for the following symptoms: cough, sore throat, and nasal and sinus congestion. (Note: Do not use decongestants if you have high blood pressure.)  Follow-up care  Follow up with your healthcare provider, or as advised.  When to seek medical advice  Call your healthcare provider right away if any of these occur:  · Cough with lots of colored sputum (mucus)  · Severe headache; face, neck, or ear pain  · Difficulty swallowing due to throat pain  · Fever of 100.4°F (38°C)  Call 911, or get immediate medical care  Call emergency services right away if any of these occur:  · Chest pain, shortness of breath, wheezing, or difficulty breathing  · Coughing up blood  · Inability to swallow due to throat pain  Date Last Reviewed: 9/13/2015  © 9496-3899 [employer]. [address] [place] [address], [place] [address]. All rights reserved. This information is not intended as a substitute for professional medical care. Always follow your healthcare professional's instructions.        Understanding Carpometacarpal Osteoarthritis    The base of the thumb where it meets the hand is called the carpometacarpal (CMC) joint. This joint allows the thumb to move freely in many directions. It also provides strength so the hand can grasp and .  A smooth tissue called cartilage lines and cushions the bones of the CMC joint. Using the thumb puts stress on the joint. Over time, this can lead to the breakdown of the cartilage in the joint. This is known as osteoarthritis. With this condition, bones of the joint may be  exposed and rub together. They may become irritated and rough. This keeps the joint from moving smoothly and can lead to pain.     How to say it  DORON-matheus-met-uh-DORON-selam      What causes CMC joint osteoarthritis?    This type of osteoarthritis is mainly caused by years of using the hand and thumb. The condition may be more likely if you:  · Regularly do things that put great stress on the thumb joint  · Have had previous thumb injuries  · Have weakened or loose structures in the thumb  · Are a woman who is past menopause  Symptoms of CMC joint osteoarthritis  Symptoms commonly include:  · Thumb pain. It may get worse with pinching or gripping.  · Thumb weakness  · Sounds of grinding or popping in the thumb joint  · Base of the thumb is enlarged   Treatment for CMC joint osteoarthritis  Osteoarthritis is a long-term (chronic) condition. Treatment focuses on managing symptoms. It may include:  · Taking prescription or over-the-counter pain medicines to help reduce pain and swelling  · Using a brace to rest and support the thumb joint  · Using hot or cold therapy to help relieve pain  · Learning and practicing ways to reduce stress on the thumb joint  · Using devices that help protect the joint. These include jar openers, pen , and spring-action scissors.  · Following a plan of physical therapy and exercises. This will help improve the flexibility and strength of the hand and thumb.  · Getting shots of medicine into the joint to help relieve symptoms for a time  If these treatments dont do enough to relieve severe pain, you may need surgery. There are several different types of surgery. In general, the goal is to relieve pain and help you to be able to use the hand.     When to call your healthcare provider  Call your healthcare provider right away if you have any of these:  · Fever of 100.4°F (38°C) or higher, or as directed  · Symptoms that dont get better, or get worse  · New symptoms   Date Last Reviewed:  3/10/2016  © 1266-9254 [employer]. [address] [place] [address], [place] [address]. All rights reserved. This information is not intended as a substitute for professional medical care. Always follow your healthcare professional's instructions.

## 2019-02-06 NOTE — PROGRESS NOTES
"Subjective:       Patient ID: Bhavna Landry is a 63 y.o. female.    Vitals:  height is 5' 4" (1.626 m) and weight is 47.6 kg (105 lb). Her temporal temperature is 98.7 °F (37.1 °C). Her blood pressure is 130/78 and her pulse is 105. Her respiration is 14 and oxygen saturation is 98%.     Chief Complaint: Sore Throat (1 week) and Hand Pain (this morning)    Patient c/o left hand pain since waking up this morning and sore throat for one week. Ambulatory. MJB      Sore Throat    This is a new problem. The current episode started in the past 7 days. The problem has been gradually worsening. Neither (same) side of throat is experiencing more pain than the other. There has been no fever. The pain is at a severity of 7/10. The pain is moderate. Associated symptoms include congestion. Pertinent negatives include no coughing, diarrhea, headaches, shortness of breath or vomiting. She has tried acetaminophen for the symptoms. The treatment provided no relief.   Hand Pain    The incident occurred more than 1 week ago. The incident occurred at home. There was no injury mechanism. The pain is present in the left hand. The quality of the pain is described as aching. The pain is at a severity of 8/10. The pain is moderate. The pain has been constant since the incident. Pertinent negatives include no chest pain. The symptoms are aggravated by movement, lifting and palpation. She has tried acetaminophen for the symptoms. The treatment provided no relief.       Constitution: Negative for chills, fatigue and fever.   HENT: Positive for congestion, postnasal drip, sinus pain, sinus pressure and sore throat.    Neck: Positive for painful lymph nodes.   Cardiovascular: Negative for chest pain and leg swelling.   Eyes: Negative for double vision and blurred vision.   Respiratory: Negative for cough and shortness of breath.    Gastrointestinal: Negative for nausea, vomiting and diarrhea.   Genitourinary: Negative for dysuria, frequency, " urgency and history of kidney stones.   Musculoskeletal: Positive for pain. Negative for joint pain, joint swelling, muscle cramps and muscle ache.        Left hand   Skin: Negative for color change, pale, rash and bruising.   Allergic/Immunologic: Negative for seasonal allergies.   Neurological: Negative for dizziness, history of vertigo, light-headedness, passing out and headaches.   Hematologic/Lymphatic: Positive for swollen lymph nodes.   Psychiatric/Behavioral: Negative for nervous/anxious, sleep disturbance and depression. The patient is not nervous/anxious.        Objective:      Physical Exam   Constitutional: She is oriented to person, place, and time. She appears well-developed and well-nourished. She is cooperative.  Non-toxic appearance. She does not appear ill. No distress.   HENT:   Head: Normocephalic and atraumatic.   Right Ear: Hearing, tympanic membrane, external ear and ear canal normal.   Left Ear: Hearing, tympanic membrane, external ear and ear canal normal.   Nose: Mucosal edema and rhinorrhea present. No nasal deformity. No epistaxis. Right sinus exhibits no maxillary sinus tenderness and no frontal sinus tenderness. Left sinus exhibits no maxillary sinus tenderness and no frontal sinus tenderness.   Mouth/Throat: Uvula is midline and mucous membranes are normal. No trismus in the jaw. Normal dentition. No uvula swelling. Posterior oropharyngeal erythema present.   Post nasal drip    Eyes: Conjunctivae and lids are normal. No scleral icterus.   Sclera clear bilat   Neck: Trachea normal, full passive range of motion without pain and phonation normal. Neck supple.   Cardiovascular: Normal rate, regular rhythm, normal heart sounds, intact distal pulses and normal pulses.   Pulmonary/Chest: Effort normal and breath sounds normal. No respiratory distress.   Abdominal: Soft. Normal appearance and bowel sounds are normal. She exhibits no distension. There is no tenderness.   Musculoskeletal: Normal  range of motion. She exhibits no edema or deformity.   Neurological: She is alert and oriented to person, place, and time. She exhibits normal muscle tone. Coordination normal.   Skin: Skin is warm, dry and intact. She is not diaphoretic. No pallor.   Psychiatric: She has a normal mood and affect. Her speech is normal and behavior is normal. Judgment and thought content normal. Cognition and memory are normal.   Nursing note and vitals reviewed.      Results for orders placed or performed in visit on 02/06/19   POCT rapid strep A   Result Value Ref Range    Rapid Strep A Screen Negative Negative     Acceptable Yes      Xr Hand Complete 3 View Left    Result Date: 2/6/2019  EXAMINATION: XR HAND COMPLETE 3 VIEW LEFT CLINICAL HISTORY: . Pain in left hand TECHNIQUE: PA, lateral, and oblique views of the left hand were performed. COMPARISON: None FINDINGS: Bones are fairly well mineralized.  Degenerative change 1st carpometacarpal joint.  Prominent geode in the base of the 1st metacarpal.  Mild narrowing of the IP joints.  No erosions.     Degenerative change 1st carpometacarpal joint. Electronically signed by: Segundo Nichols MD Date:    02/06/2019 Time:    14:30  Assessment:       1. Upper respiratory infection, viral    2. Sore throat    3. Left hand pain    4. Osteoarthritis of left hand, unspecified osteoarthritis type        Plan:         Upper respiratory infection, viral  -     predniSONE (DELTASONE) 20 MG tablet; Take 1 tablet (20 mg total) by mouth once daily. for 5 days  Dispense: 5 tablet; Refill: 0    Sore throat  -     POCT rapid strep A    Left hand pain  -     XR HAND COMPLETE 3 VIEW LEFT; Future; Expected date: 02/06/2019    Osteoarthritis of left hand, unspecified osteoarthritis type  -     BANDAGE ELASTIC 4IN ACE NS  -     ketorolac injection 30 mg      Patient Instructions     Use an antihistamine such as Claritin, Zyrtec or Allegra to dry you out.     Use Flonase 1-2 sprays/nostril per  day. It is a local acting steroid nasal spray, if you develop a bloody nose, stop using the medication immediately. Warm tea with honey.     Use warm salt water gargles to ease your throat pain. Warm salt water gargles as needed for sore throat-  1/2 tsp salt to 1 cup warm water, gargle as desired.    Sometimes Nyquil at night is beneficial to help you get some rest, however it is sedating and it does have an antihistamine, and tylenol.      For hand pain. Rest, ice, compress, and elevate.  Take NSAIDs or Tylenol as needed for pain. Patient to follow up with primary if symptoms persist or worsen.      Viral Upper Respiratory Illness (Adult)  You have a viral upper respiratory illness (URI), which is another term for the common cold. This illness is contagious during the first few days. It is spread through the air by coughing and sneezing. It may also be spread by direct contact (touching the sick person and then touching your own eyes, nose, or mouth). Frequent handwashing will decrease risk of spread. Most viral illnesses go away within 7 to 10 days with rest and simple home remedies. Sometimes the illness may last for several weeks. Antibiotics will not kill a virus, and they are generally not prescribed for this condition.    Home care  · If symptoms are severe, rest at home for the first 2 to 3 days. When you resume activity, don't let yourself get too tired.  · Avoid being exposed to cigarette smoke (yours or others).  · You may use acetaminophen or ibuprofen to control pain and fever, unless another medicine was prescribed. (Note: If you have chronic liver or kidney disease, have ever had a stomach ulcer or gastrointestinal bleeding, or are taking blood-thinning medicines, talk with your healthcare provider before using these medicines.) Aspirin should never be given to anyone under 18 years of age who is ill with a viral infection or fever. It may cause severe liver or brain damage.  · Your appetite may be  poor, so a light diet is fine. Avoid dehydration by drinking 6 to 8 glasses of fluids per day (water, soft drinks, juices, tea, or soup). Extra fluids will help loosen secretions in the nose and lungs.  · Over-the-counter cold medicines will not shorten the length of time youre sick, but they may be helpful for the following symptoms: cough, sore throat, and nasal and sinus congestion. (Note: Do not use decongestants if you have high blood pressure.)  Follow-up care  Follow up with your healthcare provider, or as advised.  When to seek medical advice  Call your healthcare provider right away if any of these occur:  · Cough with lots of colored sputum (mucus)  · Severe headache; face, neck, or ear pain  · Difficulty swallowing due to throat pain  · Fever of 100.4°F (38°C)  Call 911, or get immediate medical care  Call emergency services right away if any of these occur:  · Chest pain, shortness of breath, wheezing, or difficulty breathing  · Coughing up blood  · Inability to swallow due to throat pain  Date Last Reviewed: 9/13/2015 © 2000-2017 [employer]. [address] [place] [address]. All rights reserved. This information is not intended as a substitute for professional medical care. Always follow your healthcare professional's instructions.        Understanding Carpometacarpal Osteoarthritis    The base of the thumb where it meets the hand is called the carpometacarpal (CMC) joint. This joint allows the thumb to move freely in many directions. It also provides strength so the hand can grasp and .  A smooth tissue called cartilage lines and cushions the bones of the CMC joint. Using the thumb puts stress on the joint. Over time, this can lead to the breakdown of the cartilage in the joint. This is known as osteoarthritis. With this condition, bones of the joint may be exposed and rub together. They may become irritated and rough. This keeps the joint from moving smoothly and can lead  to pain.     How to say it  [id number]      What causes CMC joint osteoarthritis?    This type of osteoarthritis is mainly caused by years of using the hand and thumb. The condition may be more likely if you:  · Regularly do things that put great stress on the thumb joint  · Have had previous thumb injuries  · Have weakened or loose structures in the thumb  · Are a woman who is past menopause  Symptoms of CMC joint osteoarthritis  Symptoms commonly include:  · Thumb pain. It may get worse with pinching or gripping.  · Thumb weakness  · Sounds of grinding or popping in the thumb joint  · Base of the thumb is enlarged   Treatment for CMC joint osteoarthritis  Osteoarthritis is a long-term (chronic) condition. Treatment focuses on managing symptoms. It may include:  · Taking prescription or over-the-counter pain medicines to help reduce pain and swelling  · Using a brace to rest and support the thumb joint  · Using hot or cold therapy to help relieve pain  · Learning and practicing ways to reduce stress on the thumb joint  · Using devices that help protect the joint. These include jar openers, pen , and spring-action scissors.  · Following a plan of physical therapy and exercises. This will help improve the flexibility and strength of the hand and thumb.  · Getting shots of medicine into the joint to help relieve symptoms for a time  If these treatments dont do enough to relieve severe pain, you may need surgery. There are several different types of surgery. In general, the goal is to relieve pain and help you to be able to use the hand.     When to call your healthcare provider  Call your healthcare provider right away if you have any of these:  · Fever of 100.4°F (38°C) or higher, or as directed  · Symptoms that dont get better, or get worse  · New symptoms   Date Last Reviewed: 3/10/2016  © 5955-5402 [employer]. [address] [place] [address], [place] [address]. All rights reserved. This  information is not intended as a substitute for professional medical care. Always follow your healthcare professional's instructions.

## 2019-02-07 ENCOUNTER — TELEPHONE (OUTPATIENT)
Dept: PAIN MEDICINE | Facility: CLINIC | Age: 64
End: 2019-02-07

## 2019-02-07 ENCOUNTER — OFFICE VISIT (OUTPATIENT)
Dept: RHEUMATOLOGY | Facility: CLINIC | Age: 64
End: 2019-02-07
Payer: MEDICARE

## 2019-02-07 ENCOUNTER — HOSPITAL ENCOUNTER (OUTPATIENT)
Facility: HOSPITAL | Age: 64
Discharge: HOME | End: 2019-02-07
Attending: PAIN MEDICINE | Admitting: PAIN MEDICINE
Payer: MEDICARE

## 2019-02-07 VITALS
TEMPERATURE: 98 F | SYSTOLIC BLOOD PRESSURE: 157 MMHG | HEIGHT: 62 IN | BODY MASS INDEX: 19.32 KG/M2 | DIASTOLIC BLOOD PRESSURE: 72 MMHG | OXYGEN SATURATION: 97 % | WEIGHT: 105 LBS | RESPIRATION RATE: 16 BRPM | HEART RATE: 71 BPM

## 2019-02-07 VITALS
WEIGHT: 112 LBS | HEART RATE: 74 BPM | SYSTOLIC BLOOD PRESSURE: 156 MMHG | HEIGHT: 64 IN | BODY MASS INDEX: 19.12 KG/M2 | DIASTOLIC BLOOD PRESSURE: 74 MMHG

## 2019-02-07 DIAGNOSIS — G89.29 CHRONIC PAIN: ICD-10-CM

## 2019-02-07 DIAGNOSIS — M53.3 SACROILIAC JOINT DYSFUNCTION: ICD-10-CM

## 2019-02-07 DIAGNOSIS — Z11.4 ENCOUNTER FOR SCREENING FOR HIV: ICD-10-CM

## 2019-02-07 DIAGNOSIS — L40.50 PSORIATIC ARTHRITIS: Primary | ICD-10-CM

## 2019-02-07 DIAGNOSIS — M53.3 SACROILIAC JOINT DYSFUNCTION: Primary | ICD-10-CM

## 2019-02-07 DIAGNOSIS — L40.50 PSORIATIC ARTHRITIS: ICD-10-CM

## 2019-02-07 PROCEDURE — 99204 OFFICE O/P NEW MOD 45 MIN: CPT | Mod: GC,S$GLB,, | Performed by: INTERNAL MEDICINE

## 2019-02-07 PROCEDURE — 1101F PT FALLS ASSESS-DOCD LE1/YR: CPT | Mod: CPTII,GC,S$GLB, | Performed by: INTERNAL MEDICINE

## 2019-02-07 PROCEDURE — 63600175 PHARM REV CODE 636 W HCPCS: Performed by: PAIN MEDICINE

## 2019-02-07 PROCEDURE — 99499 UNLISTED E&M SERVICE: CPT | Mod: S$GLB,,, | Performed by: INTERNAL MEDICINE

## 2019-02-07 PROCEDURE — 3008F BODY MASS INDEX DOCD: CPT | Mod: CPTII,GC,S$GLB, | Performed by: INTERNAL MEDICINE

## 2019-02-07 PROCEDURE — 3077F SYST BP >= 140 MM HG: CPT | Mod: CPTII,GC,S$GLB, | Performed by: INTERNAL MEDICINE

## 2019-02-07 PROCEDURE — 25000003 PHARM REV CODE 250: Performed by: PAIN MEDICINE

## 2019-02-07 PROCEDURE — 1101F PR PT FALLS ASSESS DOC 0-1 FALLS W/OUT INJ PAST YR: ICD-10-PCS | Mod: CPTII,GC,S$GLB, | Performed by: INTERNAL MEDICINE

## 2019-02-07 PROCEDURE — 99204 PR OFFICE/OUTPT VISIT, NEW, LEVL IV, 45-59 MIN: ICD-10-PCS | Mod: GC,S$GLB,, | Performed by: INTERNAL MEDICINE

## 2019-02-07 PROCEDURE — 27096 INJECT SACROILIAC JOINT: CPT | Mod: 50,,, | Performed by: PAIN MEDICINE

## 2019-02-07 PROCEDURE — 27096 INJECT SACROILIAC JOINT: CPT | Mod: 50 | Performed by: PAIN MEDICINE

## 2019-02-07 PROCEDURE — 99999 PR PBB SHADOW E&M-EST. PATIENT-LVL V: ICD-10-PCS | Mod: PBBFAC,GC,, | Performed by: INTERNAL MEDICINE

## 2019-02-07 PROCEDURE — 27096 PR INJECTION,SACROILIAC JOINT: ICD-10-PCS | Mod: 50,,, | Performed by: PAIN MEDICINE

## 2019-02-07 PROCEDURE — 99499 RISK ADDL DX/OHS AUDIT: ICD-10-PCS | Mod: S$GLB,,, | Performed by: INTERNAL MEDICINE

## 2019-02-07 PROCEDURE — 3078F DIAST BP <80 MM HG: CPT | Mod: CPTII,GC,S$GLB, | Performed by: INTERNAL MEDICINE

## 2019-02-07 PROCEDURE — 25500020 PHARM REV CODE 255: Performed by: PAIN MEDICINE

## 2019-02-07 PROCEDURE — 99999 PR PBB SHADOW E&M-EST. PATIENT-LVL V: CPT | Mod: PBBFAC,GC,, | Performed by: INTERNAL MEDICINE

## 2019-02-07 PROCEDURE — 3078F PR MOST RECENT DIASTOLIC BLOOD PRESSURE < 80 MM HG: ICD-10-PCS | Mod: CPTII,GC,S$GLB, | Performed by: INTERNAL MEDICINE

## 2019-02-07 PROCEDURE — 3077F PR MOST RECENT SYSTOLIC BLOOD PRESSURE >= 140 MM HG: ICD-10-PCS | Mod: CPTII,GC,S$GLB, | Performed by: INTERNAL MEDICINE

## 2019-02-07 PROCEDURE — 3008F PR BODY MASS INDEX (BMI) DOCUMENTED: ICD-10-PCS | Mod: CPTII,GC,S$GLB, | Performed by: INTERNAL MEDICINE

## 2019-02-07 RX ORDER — BUPIVACAINE HYDROCHLORIDE 2.5 MG/ML
INJECTION, SOLUTION EPIDURAL; INFILTRATION; INTRACAUDAL
Status: DISCONTINUED | OUTPATIENT
Start: 2019-02-07 | End: 2019-02-07 | Stop reason: HOSPADM

## 2019-02-07 RX ORDER — SODIUM CHLORIDE 9 MG/ML
500 INJECTION, SOLUTION INTRAVENOUS CONTINUOUS
Status: DISCONTINUED | OUTPATIENT
Start: 2019-02-07 | End: 2019-02-07 | Stop reason: HOSPADM

## 2019-02-07 RX ORDER — DICLOFENAC SODIUM 10 MG/G
2 GEL TOPICAL DAILY
Qty: 1 TUBE | Refills: 3 | Status: SHIPPED | OUTPATIENT
Start: 2019-02-07 | End: 2020-02-03 | Stop reason: SDUPTHER

## 2019-02-07 RX ORDER — METHYLPREDNISOLONE ACETATE 40 MG/ML
INJECTION, SUSPENSION INTRA-ARTICULAR; INTRALESIONAL; INTRAMUSCULAR; SOFT TISSUE
Status: DISCONTINUED | OUTPATIENT
Start: 2019-02-07 | End: 2019-02-07 | Stop reason: HOSPADM

## 2019-02-07 RX ORDER — SULFASALAZINE 500 MG/1
TABLET, DELAYED RELEASE ORAL
Qty: 20 TABLET | Refills: 0 | Status: SHIPPED | OUTPATIENT
Start: 2019-02-07 | End: 2019-02-07 | Stop reason: SDUPTHER

## 2019-02-07 RX ORDER — LIDOCAINE HYDROCHLORIDE 10 MG/ML
1 INJECTION, SOLUTION EPIDURAL; INFILTRATION; INTRACAUDAL; PERINEURAL ONCE
Status: COMPLETED | OUTPATIENT
Start: 2019-02-07 | End: 2019-02-07

## 2019-02-07 RX ORDER — SULFASALAZINE 500 MG/1
TABLET, DELAYED RELEASE ORAL
Qty: 225 TABLET | Refills: 1 | Status: SHIPPED | OUTPATIENT
Start: 2019-02-07 | End: 2019-08-07 | Stop reason: SDUPTHER

## 2019-02-07 ASSESSMENT — ROUTINE ASSESSMENT OF PATIENT INDEX DATA (RAPID3)
PSYCHOLOGICAL DISTRESS SCORE: 1.1
PAIN SCORE: 8
MDHAQ FUNCTION SCORE: .4
PATIENT GLOBAL ASSESSMENT SCORE: 4.5
AM STIFFNESS SCORE: 1, YES
TOTAL RAPID3 SCORE: 4.61
WHEN YOU AWAKENED IN THE MORNING OVER THE LAST WEEK, PLEASE INDICATE THE AMOUNT OF TIME IT TAKES UNTIL YOU ARE AS LIMBER AS YOU WILL BE FOR THE DAY: 1 HR
FATIGUE SCORE: 4

## 2019-02-07 NOTE — DISCHARGE SUMMARY
[facility]  Discharge Note  Short Stay     Admit Date: 2/7/2019    Discharge Date: 2/7/2019     Attending Physician: Michelle Pineda Jr, MD    Diagnoses:  Active Hospital Problems    Diagnosis  POA    *Sacroiliac joint dysfunction [M53.3]  Yes    Chronic pain [G89.29]  Yes      Resolved Hospital Problems   No resolved problems to display.     Discharged Condition: Good     Hospital Course: Patient was admitted for an outpatient interventional pain management procedure and tolerated the procedure well with no complications.     Final Diagnoses: Same as principal problem.     Disposition: Home or Self Care     Follow up/Patient Instructions:    Follow-up Information     Michelle Pineda Jr, MD. Go in 3 weeks.    Specialty:  Pain Medicine  Why:  Post-procedural Follow Up As Scheduled, Call to make an appointment if you do not have one  Contact information:  [address]  [address]  [place] [address]  [phone]                   Reconciled Medications:     Medication List      CONTINUE taking these medications    amLODIPine 10 MG tablet  Commonly known as:  NORVASC  Take 1 tablet (10 mg total) by mouth once daily.     aspirin 325 MG tablet  Take 325 mg by mouth once daily.     atorvastatin 40 MG tablet  Commonly known as:  LIPITOR  Take 1 tablet (40 mg total) by mouth once daily.     gabapentin 300 MG capsule  Commonly known as:  NEURONTIN  Take 2 capsules (600 mg total) by mouth every evening.     lidocaine 5 %  Commonly known as:  LIDODERM  Place 1 patch onto the skin once daily. Remove & Discard patch within 12 hours or as directed by MD     lisinopril 10 MG tablet  Take 1 tablet (10 mg total) by mouth once daily.     multivitamin capsule  Take 1 capsule by mouth once daily.     predniSONE 20 MG tablet  Commonly known as:  DELTASONE  Take 1 tablet (20 mg total) by mouth once daily. for 5 days     risperiDONE 4 MG tablet  Commonly known as:  RISPERDAL  Take 1 tablet (4 mg total) by mouth 2  (two) times daily.     traMADol 50 mg tablet  Commonly known as:  ULTRAM  1-2 tabs po q 6-8 h prn pain     venlafaxine 150 MG Cp24  Commonly known as:  EFFEXOR-XR  Take 1 capsule (150 mg total) by mouth once daily.           Discharge Procedure Orders (must include Diet, Follow-up, Activity)   Call MD for:  temperature >100.4     Call MD for:  severe uncontrolled pain     Call MD for:  redness, tenderness, or signs of infection (pain, swelling, redness, odor or green/yellow discharge around incision site)     Call MD for:  difficulty breathing or increased cough     Call MD for:  severe persistent headache     Call MD for:  worsening rash     Remove dressing in 24 hours       Michelle Pineda Jr, MD  Interventional Pain Medicine / Anesthesiology

## 2019-02-07 NOTE — PROGRESS NOTES
Discharge instructions reviewed with patient. Questions answered. Verbalized understanding, no further questions at this time. Procedure site is CDI. VSS. No acute distress noted. Staff at bedside to help pt change. Pt did not have sedation and refusing wheelchair at this time.

## 2019-02-07 NOTE — PROGRESS NOTES
Subjective:     Patient ID: Bhavna Landry is a 63 y.o. female     Chief Complaint: Joint Pain       HPI    This is a 62 yo F with RLS, HTN, HLD, fibromyalgia, psoriatic arthritis, SI joint dysfunction who presents with complaints of left hand pain x 5 days. The pain started suddenly and involves the back of the left hand towards the base of the thumb and in between the thumb and index finger. Any kind of movement makes it worse. It is an achy 8/10 pain with radiation all over the left hand. Has also noticed some associated swelling involving the thumb joints and the left 2nd digit. No numbness or tingling. No other joints are bothersome. Psoriasis well controlled except for her heels. Has not taken any NSAIDs. Does take Tylenol 500 mg, 4 tablets a day which provide some relief. Went to urgent care with the pain yesterday and was given ketorolac injection which did not help the pain much. Also prescribed prednisone 20 mg x 5 days for viral upper respiratory tract infection. Just took first dose of prednisone 20 mg today. Patient was last seen in the Rheumatology clinic in 2014 for psoriatic arthritis. She was on SSZ 1500 mg twice daily and doing well. Patient reports that she developed some family issues and that is why she was lost to follow-up. Retired. No alcohol or smoking. Has lower back pain and gets injections with pain management. Denies associated alopecia, bleeding/clotting problems, dry eyes, dry mouth, fatigue, fevers, memory loss, muscle weakness, nausea, new headache, nodules, oral/nasal ulcers, palpitations, pleurisy, polydypsia, polyuria, rashes/photosensitive, Raynaud's, weight loss, and seizures.      Current Outpatient Medications   Medication Sig Dispense Refill    aspirin 325 MG tablet Take 325 mg by mouth once daily.      atorvastatin (LIPITOR) 40 MG tablet Take 1 tablet (40 mg total) by mouth once daily. 30 tablet 11    lisinopril 10 MG tablet Take 1 tablet (10 mg total) by mouth once  daily. 30 tablet 11    venlafaxine (EFFEXOR-XR) 150 MG Cp24 Take 1 capsule (150 mg total) by mouth once daily. 30 capsule 11    amLODIPine (NORVASC) 10 MG tablet Take 1 tablet (10 mg total) by mouth once daily. 30 tablet 0    gabapentin (NEURONTIN) 300 MG capsule Take 2 capsules (600 mg total) by mouth every evening. 60 capsule 5    risperiDONE (RISPERDAL) 4 MG tablet Take 1 tablet (4 mg total) by mouth 2 (two) times daily. 60 tablet 3     No current facility-administered medications for this visit.        Review of patient's allergies indicates:   Allergen Reactions    Adhesive      Other reaction(s): Rash    Chloromycetin [chloramphenicol sod succinate] Hives    Etanercept      Other reaction(s): recurrent infections    Nickel sutures [surgical stainless steel]      Allergic contact dermatitis       Review of Systems   Constitutional: Negative for chills, fatigue and fever.   HENT: Positive for congestion, sinus pressure and sinus pain.    Eyes: Negative for pain and redness.   Respiratory: Negative for shortness of breath.    Cardiovascular: Negative for chest pain and leg swelling.   Gastrointestinal: Negative for abdominal distention, diarrhea and nausea.   Endocrine: Negative for polydipsia and polyphagia.   Genitourinary: Negative for difficulty urinating.   Musculoskeletal: Positive for arthralgias, back pain and joint swelling.   Skin: Positive for rash.   Neurological: Negative for dizziness.       Past Medical History:   Diagnosis Date    Allergy     Amblyopia     Anemia     Arthritis 02/02/1992    Cataract     Depression     Dry eyes     Dry mouth     Fibromyalgia 4/17/2014    Fibromyalgia     GERD (gastroesophageal reflux disease)     History of psychiatric hospitalization     Hyperlipidemia 02/02/1992    Hypertension     Kidney stone     Migraine headache     Osteoporosis     Psoriatic arthritis 02/02/1992    Right knee pain     post knee replacement surgery (possible  rejectiion of metal)    RLS (restless legs syndrome)     Schizophrenia 1992    stable on meds    Urinary tract infection        Past Surgical History:   Procedure Laterality Date    ARTHROPLASTY, KNEE, TOTAL Right 2013    Performed by Philipp [facility] at [facility] OR 2ND FLR    BLOCK, SACROILIAC JOINT-BILATERAL Bilateral 2018    Performed by Michelle Pineda Jr., [facility] at [facility] PAIN MGT    BLOCK, SACROILIAC JOINT-Right- ORAL SEDATION Right 2018    Performed by Michelle Pineda Jr., MD at [facility] PAIN MGT     SECTION      COLONOSCOPY N/A 2016    Performed by ANDRIA Connelly MD at [facility] ENDO (4TH FLR)    COLONOSCOPY N/A 2013    Performed by Didier Poole MD at [facility] ENDO (4TH FLR)    cyst removed from right sinus  1982    GRAFT-FEMORAL AUTOLOGOUS BONE GRAFTING Right 2014    Performed by Stone [facility] at [facility] OR    HYSTERECTOMY      VAGINAL HYSTERECTOMY WITHOUT BSO - ENDOMETRIOSIS    JOINT REPLACEMENT Right     knee    KNEE SURGERY      REVISION-ARTHROPLASTY-KNEE-TOTAL Right 2014    Performed by [facility] at [facility] OR    Surgery on right knee  1982    tumor removed from back left side upper shoulder  2006       Family History   Problem Relation Age of Onset    Colon cancer Mother     Psoriasis Mother     Cancer Mother         colon    Depression Mother     Diabetes Brother     Arthritis Brother     Heart disease Brother         cad    Cancer Father         lymphoma    Stroke Sister     No Known Problems Daughter     Hypertension Neg Hx     Suicide Neg Hx     Amblyopia Neg Hx     Blindness Neg Hx     Cataracts Neg Hx     Glaucoma Neg Hx     Macular degeneration Neg Hx     Retinal detachment Neg Hx     Strabismus Neg Hx        Social History     Tobacco Use    Smoking status: Former Smoker     Packs/day: 0.50     Years: 10.00     Pack years: 5.00     Types: Cigarettes     Last attempt to quit: 2012  "    Years since quittin.7    Smokeless tobacco: Former User    Tobacco comment: stopped smoking 2017.     Substance Use Topics    Alcohol use: No    Drug use: No       Objective:     BP (!) 156/74   Pulse 74   Ht 5' 3.6" (1.615 m)   Wt 50.8 kg (112 lb)   BMI 19.47 kg/m²     Physical Exam   Constitutional: She is oriented to person, place, and time and well-developed, well-nourished, and in no distress.   HENT:   Head: Normocephalic and atraumatic.   Eyes: EOM are normal. Pupils are equal, round, and reactive to light.   Neck: Normal range of motion. Neck supple.   Cardiovascular: Normal rate and regular rhythm.    Pulmonary/Chest: Effort normal and breath sounds normal.   Abdominal: Soft. Bowel sounds are normal.       Right Side Rheumatological Exam     Examination finds the shoulder, elbow, wrist, knee, 1st PIP, 1st MCP, 2nd PIP, 3rd MCP, 4th PIP, 4th MCP, 5th PIP and 5th MCP normal.    The patient is tender to palpation of the 2nd MCP    She has swelling of the 2nd MCP    Muscle Strength (0-5 scale):  Deltoid:  5  Biceps: 5/5   Triceps:  5  : 5/5   Iliopsoas: 5  Quadriceps:  5   Distal Lower Extremity: 5    Left Side Rheumatological Exam     Examination finds the shoulder, elbow, wrist, knee, 3rd PIP, 4th PIP, 5th PIP and 5th MCP normal.    The patient is tender to palpation of the 1st PIP, 1st MCP, 2nd PIP, 2nd MCP, 3rd MCP and 4th MCP.    She has swelling of the 1st PIP, 1st MCP, 2nd PIP, 2nd MCP and 3rd MCP    Muscle Strength (0-5 scale):  Deltoid:  5  Biceps: 5/5   Triceps:  5  :  5/5   Iliopsoas: 5  Quadriceps:  5   Distal Lower Extremity: 5      Neurological: She is alert and oriented to person, place, and time.   Skin: Rash noted.     Peeling skin with erythema b/l heels consistent with plaque psoriasis   Psychiatric: Affect normal.     Results for HECTOR HAZEL (MRN [id number]) as of 2019 21:46   Ref. Range 2018 09:40   WBC Latest Ref Range: 3.90 - 12.70 K/uL 8.69   RBC Latest " Ref Range: 4.00 - 5.40 M/uL 5.11   Hemoglobin Latest Ref Range: 12.0 - 16.0 g/dL 15.3   Hematocrit Latest Ref Range: 37.0 - 48.5 % 44.8   MCV Latest Ref Range: 82 - 98 fL 88   MCH Latest Ref Range: 27.0 - 31.0 pg 29.9   MCHC Latest Ref Range: 32.0 - 36.0 g/dL 34.2   RDW Latest Ref Range: 11.5 - 14.5 % 13.2   Platelets Latest Ref Range: 150 - 350 K/uL 331   MPV Latest Ref Range: 9.2 - 12.9 fL 8.5 (L)   Gran% Latest Ref Range: 38.0 - 73.0 % 54.2   Gran # (ANC) Latest Ref Range: 1.8 - 7.7 K/uL 4.7   Immature Granulocytes Latest Ref Range: 0.0 - 0.5 % 0.2   Immature Grans (Abs) Latest Ref Range: 0.00 - 0.04 K/uL 0.02   Lymph% Latest Ref Range: 18.0 - 48.0 % 30.5   Lymph # Latest Ref Range: 1.0 - 4.8 K/uL 2.7   Mono% Latest Ref Range: 4.0 - 15.0 % 9.0   Mono # Latest Ref Range: 0.3 - 1.0 K/uL 0.8   Eosinophil% Latest Ref Range: 0.0 - 8.0 % 5.4   Eos # Latest Ref Range: 0.0 - 0.5 K/uL 0.5   Basophil% Latest Ref Range: 0.0 - 1.9 % 0.7   Baso # Latest Ref Range: 0.00 - 0.20 K/uL 0.06   nRBC Latest Ref Range: 0 /100 WBC 0   Differential Method Unknown Automated   Results for HECTOR HAZEL (MRN [id number]) as of 2/7/2019 21:46   Ref. Range 12/11/2018 09:40   Sodium Latest Ref Range: 136 - 145 mmol/L 134 (L)   Potassium Latest Ref Range: 3.5 - 5.1 mmol/L 3.8   Chloride Latest Ref Range: 95 - 110 mmol/L 96   CO2 Latest Ref Range: 23 - 29 mmol/L 25   Anion Gap Latest Ref Range: 8 - 16 mmol/L 13   BUN, Bld Latest Ref Range: 8 - 23 mg/dL 7 (L)   Creatinine Latest Ref Range: 0.5 - 1.4 mg/dL 1.0   eGFR if non African American Latest Ref Range: >60 mL/min/1.73 m^2 59.3 (A)   eGFR if African American Latest Ref Range: >60 mL/min/1.73 m^2 >60.0   Glucose Latest Ref Range: 70 - 110 mg/dL 113 (H)   Calcium Latest Ref Range: 8.7 - 10.5 mg/dL 10.6 (H)   Alkaline Phosphatase Latest Ref Range: 55 - 135 U/L 93   Total Protein Latest Ref Range: 6.0 - 8.4 g/dL 6.9   Albumin Latest Ref Range: 3.5 - 5.2 g/dL 3.6   Total Bilirubin Latest Ref Range:  0.1 - 1.0 mg/dL 0.4   AST Latest Ref Range: 10 - 40 U/L 36   ALT Latest Ref Range: 10 - 44 U/L 25   Results for HECTOR HAZEL (MRN [id number]) as of 2/7/2019 21:46   Ref. Range 5/6/2014 14:49   KRIS Screen Latest Ref Range: Negative <1:160  Negative <1:160   CCP Antibodies Latest Ref Range: <5.0 U/mL <0.5   Rheumatoid Factor Latest Ref Range: 0.0 - 15.0 IU/mL <10.0       Assessment:     1. Psoriatic arthritis    2. Encounter for screening for HIV     3. Sacroiliac joint dysfunction      This is a 62 yo F with RLS, HTN, HLD, fibromyalgia, psoriatic arthritis, SI joint dysfunction who presents with complaints of left hand pain x 5 days. Last seen in Rheumatology clinic in 7/2014. At that time PsA well controlled on SSZ 1500 mg twice daily. Patient reports that she took SSZ until she ran out. Has missed follow-ups to do personal issues. On exam TJC: 7, SJC: 6. Has mild psoriasis on heels. Pain level: 8, Pt. Global: 4.5. Need CRP to complete DAPSA but patient does appear to have moderate disease activity. Will need to re-start medications.   SI joint dysfunction: follows with pain management. Will check pelvic xrays to r/o sacroilitis related to PsA  Plan:     - re-start SSZ. Start with 500 mg twice daily x 1 week, then increase to 1000 mg twice daily x 1 week, then increase to 1500 mg twice daily and stay on this dose  - try Voltaren gel 1% up to 4 times a day over affected small joints  - can take Tylenol extra strength up 3g/d  - avoid NSAIDs  - ID consult for vaccinations  - pre-dmard labs  - CBC, CMP, sed rate, CRP  - OT referral  - hand out on brace given  - arthritis survey   - X-Ray Pelvis Routine AP; Future    RTC in 1 week    CC: Sadaf Vargas MD

## 2019-02-07 NOTE — TELEPHONE ENCOUNTER
Returned call for the second time no answer no vm .    ----- Message from Leona Connelly sent at 2/7/2019  8:30 AM CST -----  Contact: [phone]  Pt has a procedure schedule today for 9:30 pm , but she has a sore throat and sinus with no fever . Pt wants to know if she needs to reschedule . Please advise

## 2019-02-07 NOTE — OP NOTE
Procedure Note    Pre-operative Diagnosis: Sacroiliac Joint Dysfunction  Post-operative Diagnosis: Sacroiliac Joint Dysfunction  Procedure Date: 02/07/2019      Procedure:  (1) BILATERAL Sacroiliac Joint Injection     (2) Intraoperative fluoroscopy      Indications: (1) To alleviate pain and suffering, (2) reduce functional impairment, and for (3) diagnostic purposes.    The patients history and physical exam were reviewed. The risks (local discomfort, infection, headache, temporary or permanent weakness and/or numbness of one or both legs, temporary or permanent paraplegia, heart attack and stroke), benefits and alternatives to the procedure were discussed, and all questions were answered to the patients satisfaction. The patient agreed to proceed, and written, informed consent was verified.    Procedure in Detail: Patient was brought back to procedure room and placed in a prone position and head resting comfortably in head ring. Prior to the initiation of the procedure, the patient's identity, the site, and the nature of the procedure were verified.     The skin was prepped with chloroprep and sterile drapes were applied. The Right SI joint was identified by fluoroscopy in an oblique plane. Skin and subcutaneous tissues overyling the target area was anesthetized with 1-2 mL of Lidocaine 1%.  A 22g 3 1/2 inch spinal needle was advanced under intermittent fluoroscopy until joint space was entered. There was no paresthesia with needle placement. Aspiration was negative for blood. Omnipaque 0.5 mL was injected confirming appropriate position and spread into the joint with local accumulation and no evidence of direct vascular uptake. Next, 1.5 mL containing Depomedrol 40 mg (0.5 mL) and Bupivacaine 0.5% (1 mL) was injected without difficulty or resistance.  The spinal needle was removed with lidocaine flush and bandaged placed over site of needle insertion.      The same procedure was repeated on the left  side.    EBL: nil    Disposition: The patient tolerated the procedure well, and there were no apparent complications. Vital signs remained stable throughout the procedure. The patient was taken to the recovery area where written discharge instructions for the procedure were given.     Follow-up: RTC as scheduled      Michelle Pineda Jr, MD  Interventional Pain Medicine / Anesthesiology

## 2019-02-07 NOTE — TELEPHONE ENCOUNTER
Returned call no answer no vm      ----- Message from Sheron Carrillo sent at 2/7/2019  7:43 AM CST -----  Contact: [phone]/self  Patient would like to know if she can still have her procedure with a runny nose and coughing. Please advise.

## 2019-02-08 ENCOUNTER — PATIENT MESSAGE (OUTPATIENT)
Dept: RHEUMATOLOGY | Facility: HOSPITAL | Age: 64
End: 2019-02-08

## 2019-02-08 ENCOUNTER — HOSPITAL ENCOUNTER (OUTPATIENT)
Dept: RADIOLOGY | Facility: HOSPITAL | Age: 64
Discharge: HOME | End: 2019-02-08
Attending: INTERNAL MEDICINE
Payer: MEDICARE

## 2019-02-08 ENCOUNTER — TELEPHONE (OUTPATIENT)
Dept: RHEUMATOLOGY | Facility: CLINIC | Age: 64
End: 2019-02-08

## 2019-02-08 ENCOUNTER — TELEPHONE (OUTPATIENT)
Dept: INTERNAL MEDICINE | Facility: CLINIC | Age: 64
End: 2019-02-08

## 2019-02-08 DIAGNOSIS — L40.50 PSORIATIC ARTHRITIS: ICD-10-CM

## 2019-02-08 PROCEDURE — 72170 X-RAY EXAM OF PELVIS: CPT | Mod: TC,FY,PO

## 2019-02-08 PROCEDURE — 72170 XR PELVIS ROUTINE AP: ICD-10-PCS | Mod: 26,,, | Performed by: RADIOLOGY

## 2019-02-08 PROCEDURE — 72170 X-RAY EXAM OF PELVIS: CPT | Mod: 26,,, | Performed by: RADIOLOGY

## 2019-02-08 RX ORDER — HYDROCHLOROTHIAZIDE 12.5 MG/1
12.5 TABLET ORAL DAILY
Qty: 30 TABLET | Refills: 0 | Status: SHIPPED | OUTPATIENT
Start: 2019-02-08 | End: 2019-03-19 | Stop reason: SDUPTHER

## 2019-02-08 RX ORDER — HYDROCHLOROTHIAZIDE 12.5 MG/1
TABLET ORAL
Qty: 90 TABLET | Refills: 0 | OUTPATIENT
Start: 2019-02-08

## 2019-02-08 NOTE — PROGRESS NOTES
I  Have personally take the history and examined the patient and agree with fellow's note as stated above. Will likely need biologic Rx. Will review medical record to determine why etanercept discontinued in past. YEN

## 2019-02-08 NOTE — TELEPHONE ENCOUNTER
Detailed review of legacy documents: took etanercept 50mg twice weekly for 3 months Rx by Josefina Vega in [facility] for psoriasis started 7/12/05. Then 50mg sc weekly until 4/2008 and stopped b/o recurrent infections requiring frequent holding of Enbrel dosing. She did not want to resume for this reason.

## 2019-02-08 NOTE — TELEPHONE ENCOUNTER
----- Message from Lorena Deshpande sent at 2/8/2019  9:01 AM CST -----  Contact: Patient [phone]  Patient was seen by Rheumatologist. Patients Legs, ankles and feet are swollen.  Patients pressure is 157/79. Patient is requesting a RX for the fluid she is maintaining. Patient is not interested in being seen.  Please call and advise  Thank you

## 2019-02-11 ENCOUNTER — TELEPHONE (OUTPATIENT)
Dept: INTERNAL MEDICINE | Facility: CLINIC | Age: 64
End: 2019-02-11

## 2019-02-11 ENCOUNTER — PATIENT MESSAGE (OUTPATIENT)
Dept: RHEUMATOLOGY | Facility: CLINIC | Age: 64
End: 2019-02-11

## 2019-02-11 RX ORDER — DOXYCYCLINE 100 MG/1
100 CAPSULE ORAL 2 TIMES DAILY
Qty: 20 CAPSULE | Refills: 0 | Status: SHIPPED | OUTPATIENT
Start: 2019-02-11 | End: 2019-02-21

## 2019-02-11 NOTE — TELEPHONE ENCOUNTER
Spoke with pt, complaints of cough, runny nose, sweats. Denies body aches, headaches, or chills. States her temp is not her normal but is not considered fever to us (98.8) Symptoms have been going on for 11 days now. Was seen in urgent care and prescribed prednisone. Taking last pill today. No relief with the prednisone. Has been taking tylenol and has tried zyrtec, Claritin d, and benadryl but can not get ride of the runny nose and cough. Pt would like recommendations or something called in to dry up the mucus.      Please advise

## 2019-02-11 NOTE — TELEPHONE ENCOUNTER
----- Message from Tamera Jaramillo sent at 2/11/2019 10:04 AM CST -----  Contact: Patient [phone]      ----- Message -----  From: Lorena Deshpande  Sent: 2/11/2019   9:32 AM  To: Alicia CASTANEDA Staff    Patient would like to get medical advice.  Symptoms (please be specific):  Mucus(clear runny nose night sweats. Patient was seen in urgent care and received predisone four days ago. Medication is not completely working.    How long has patient had these symptoms:  11 days ago  Pharmacy name and phone [facility] [id number] [place] [address] [address] AT [facility] [address] [phone] (Phone)  [phone] (Fax)    Would the patient rather a call back or a response via [facility]?:  no  Comments:  Patient would like to receive medication for continuing symptoms.  Please call and advise  Thank you

## 2019-02-12 ENCOUNTER — TELEPHONE (OUTPATIENT)
Dept: INTERNAL MEDICINE | Facility: CLINIC | Age: 64
End: 2019-02-12

## 2019-02-12 RX ORDER — ALBUTEROL SULFATE 90 UG/1
2 AEROSOL, METERED RESPIRATORY (INHALATION) EVERY 6 HOURS PRN
Qty: 1 EACH | Refills: 11 | Status: SHIPPED | OUTPATIENT
Start: 2019-02-12 | End: 2019-11-11

## 2019-02-12 RX ORDER — ALBUTEROL SULFATE 90 UG/1
AEROSOL, METERED RESPIRATORY (INHALATION)
Qty: 18 G | Refills: 0 | Status: SHIPPED | OUTPATIENT
Start: 2019-02-12 | End: 2019-02-21 | Stop reason: SDUPTHER

## 2019-02-12 RX ORDER — BACLOFEN 20 MG/1
20 TABLET ORAL 3 TIMES DAILY
Qty: 90 TABLET | Refills: 11 | Status: ON HOLD | OUTPATIENT
Start: 2019-02-12 | End: 2020-06-02 | Stop reason: CLARIF

## 2019-02-12 RX ORDER — ALBUTEROL SULFATE 90 UG/1
2 AEROSOL, METERED RESPIRATORY (INHALATION) EVERY 6 HOURS PRN
Qty: 1 EACH | Refills: 11 | Status: SHIPPED | OUTPATIENT
Start: 2019-02-12 | End: 2019-10-28 | Stop reason: SDUPTHER

## 2019-02-12 NOTE — TELEPHONE ENCOUNTER
----- Message from Yenni Nagel sent at 2/12/2019  8:58 AM CST -----  Contact: [phone]  Patient needs something for leg cramps and right is starting to cramp too.    Please advise, thank you

## 2019-02-12 NOTE — TELEPHONE ENCOUNTER
----- Message from Monique Gonzalez sent at 2/12/2019  3:02 PM CST -----  Contact: Patient [phone]  Pt states that she is calling to speak with someone in the office. She states that she wants to know if  can send her in an inhaler. She states that she has been coughing and wheezing. She also states that [employer] won pay for Proventol or Ventalin inhalers and wants to know if you can look up which one they will pay for and have it sent over to [facility] [id number] [place] [address] [address] AT [facility] [address]. Please call back and advise.      Thanks

## 2019-02-12 NOTE — TELEPHONE ENCOUNTER
Gary - could you pls tell me what bronchodilator is covered?  [facility] rejected albuterol  And can't tell me what is preferred. I tried proventil also -

## 2019-02-12 NOTE — TELEPHONE ENCOUNTER
Pt is calling asking for something for her leg cramps called into her pharmacy. Does pt need to be seen or can something be called in? Pls advise.

## 2019-02-12 NOTE — TELEPHONE ENCOUNTER
Called [facility]: they stated they cannot  look up to see what insurance will cover but usually medicaid will cover albuterol ventolin

## 2019-02-13 ENCOUNTER — TELEPHONE (OUTPATIENT)
Dept: INTERNAL MEDICINE | Facility: CLINIC | Age: 64
End: 2019-02-13

## 2019-02-13 NOTE — TELEPHONE ENCOUNTER
----- Message from Peggy Padgett sent at 2/13/2019  9:38 AM CST -----  Contact: [facility]/[phone]  Prescription Alternative Needed:     The pharmacy needs alternative on the following RX:    Albuterol hfa inh (200 puffs) 18 gm    Reason: Drug not covered. Preferred alternative is Leval buterol aeract, Ventolin hfa aer. Please call/fax the pharmacy to change medication along with strength, directions, quantity, and refills     Pharmacy:   [facility] [address] [place] [address] [address] AT [facility] [address] [phone] (Phone)  [phone] (Fax)    Please advise.    Thank You

## 2019-02-14 ENCOUNTER — OFFICE VISIT (OUTPATIENT)
Dept: RHEUMATOLOGY | Facility: CLINIC | Age: 64
End: 2019-02-14
Payer: MEDICARE

## 2019-02-14 VITALS
HEIGHT: 62 IN | BODY MASS INDEX: 20.24 KG/M2 | WEIGHT: 110 LBS | SYSTOLIC BLOOD PRESSURE: 99 MMHG | HEART RATE: 82 BPM | DIASTOLIC BLOOD PRESSURE: 60 MMHG

## 2019-02-14 DIAGNOSIS — M79.7 FIBROMYALGIA: ICD-10-CM

## 2019-02-14 DIAGNOSIS — Z51.81 MEDICATION MONITORING ENCOUNTER: ICD-10-CM

## 2019-02-14 DIAGNOSIS — M19.90 OSTEOARTHRITIS, UNSPECIFIED OSTEOARTHRITIS TYPE, UNSPECIFIED SITE: ICD-10-CM

## 2019-02-14 DIAGNOSIS — M53.3 SACROILIAC JOINT DYSFUNCTION: ICD-10-CM

## 2019-02-14 DIAGNOSIS — L40.50 PSA (PSORIATIC ARTHRITIS): Primary | ICD-10-CM

## 2019-02-14 PROCEDURE — 99215 OFFICE O/P EST HI 40 MIN: CPT | Mod: GC,S$GLB,, | Performed by: INTERNAL MEDICINE

## 2019-02-14 PROCEDURE — 1101F PT FALLS ASSESS-DOCD LE1/YR: CPT | Mod: CPTII,GC,S$GLB, | Performed by: INTERNAL MEDICINE

## 2019-02-14 PROCEDURE — 99499 RISK ADDL DX/OHS AUDIT: ICD-10-PCS | Mod: S$GLB,,, | Performed by: INTERNAL MEDICINE

## 2019-02-14 PROCEDURE — 3008F PR BODY MASS INDEX (BMI) DOCUMENTED: ICD-10-PCS | Mod: CPTII,GC,S$GLB, | Performed by: INTERNAL MEDICINE

## 2019-02-14 PROCEDURE — 99999 PR PBB SHADOW E&M-EST. PATIENT-LVL IV: ICD-10-PCS | Mod: PBBFAC,GC,, | Performed by: INTERNAL MEDICINE

## 2019-02-14 PROCEDURE — 3078F DIAST BP <80 MM HG: CPT | Mod: CPTII,GC,S$GLB, | Performed by: INTERNAL MEDICINE

## 2019-02-14 PROCEDURE — 3074F SYST BP LT 130 MM HG: CPT | Mod: CPTII,GC,S$GLB, | Performed by: INTERNAL MEDICINE

## 2019-02-14 PROCEDURE — 3074F PR MOST RECENT SYSTOLIC BLOOD PRESSURE < 130 MM HG: ICD-10-PCS | Mod: CPTII,GC,S$GLB, | Performed by: INTERNAL MEDICINE

## 2019-02-14 PROCEDURE — 99499 UNLISTED E&M SERVICE: CPT | Mod: S$GLB,,, | Performed by: INTERNAL MEDICINE

## 2019-02-14 PROCEDURE — 1101F PR PT FALLS ASSESS DOC 0-1 FALLS W/OUT INJ PAST YR: ICD-10-PCS | Mod: CPTII,GC,S$GLB, | Performed by: INTERNAL MEDICINE

## 2019-02-14 PROCEDURE — 3078F PR MOST RECENT DIASTOLIC BLOOD PRESSURE < 80 MM HG: ICD-10-PCS | Mod: CPTII,GC,S$GLB, | Performed by: INTERNAL MEDICINE

## 2019-02-14 PROCEDURE — 3008F BODY MASS INDEX DOCD: CPT | Mod: CPTII,GC,S$GLB, | Performed by: INTERNAL MEDICINE

## 2019-02-14 PROCEDURE — 99999 PR PBB SHADOW E&M-EST. PATIENT-LVL IV: CPT | Mod: PBBFAC,GC,, | Performed by: INTERNAL MEDICINE

## 2019-02-14 PROCEDURE — 99215 PR OFFICE/OUTPT VISIT, EST, LEVL V, 40-54 MIN: ICD-10-PCS | Mod: GC,S$GLB,, | Performed by: INTERNAL MEDICINE

## 2019-02-14 NOTE — PROGRESS NOTES
Subjective:     Patient ID: Bhavna Landry is a 63 y.o. female     Chief Complaint: Disease Management       HPI    This is a 64 yo F with RLS, HTN, HLD, fibromyalgia, psoriatic arthritis, SI joint dysfunction who presents with complaints of left hand pain x 5 days. The pain started suddenly and involves the back of the left hand towards the base of the thumb and in between the thumb and index finger. Any kind of movement makes it worse. It is an achy 8/10 pain with radiation all over the left hand. Has also noticed some associated swelling involving the thumb joints and the left 2nd digit. No numbness or tingling. No other joints are bothersome. Psoriasis well controlled except for her heels. Has not taken any NSAIDs. Does take Tylenol 500 mg, 4 tablets a day which provide some relief. Went to urgent care with the pain yesterday and was given ketorolac injection which did not help the pain much. Also prescribed prednisone 20 mg x 5 days for viral upper respiratory tract infection. Just took first dose of prednisone 20 mg today. Patient was last seen in the Rheumatology clinic in 2014 for psoriatic arthritis. She was on SSZ 1500 mg twice daily and doing well. Patient reports that she developed some family issues and that is why she was lost to follow-up. Retired. No alcohol or smoking. Has lower back pain and gets injections with pain management. Denies associated alopecia, bleeding/clotting problems, dry eyes, dry mouth, fatigue, fevers, memory loss, muscle weakness, nausea, new headache, nodules, oral/nasal ulcers, palpitations, pleurisy, polydypsia, polyuria, rashes/photosensitive, Raynaud's, weight loss, and seizures.      Interval history:  Patient here for follow-up of psoriatic arthritis. Last visit on 2/7/19. At that time, patient with significant pain 8/10 pain involving the back of the left hand mainly the entire thumb and both 2nd and 3rd knuckles. Has bought a brace and has been wearing it all day.  Completed 5 day course of prednisone 20 mg daily last week but upper respiratory symptoms continued. Due to worsening cough, saw her PCP, Dr. Vargas two days ago and was prescribed a 10 day course of doxycycline. Also started on Sulfasalazine 500 mg BID. Will be increasing to 1000 mg BID today. Is still taking Tylenol 1000 mg BID.     Current Outpatient Medications   Medication Sig Dispense Refill    albuterol (PROVENTIL/VENTOLIN HFA) 90 mcg/actuation inhaler INHALE 2 PUFFS INTO THE LUNGS EVERY 6 HOURS AS NEEDED FOR WHEEZING 18 g 0    albuterol (PROVENTIL/VENTOLIN HFA) 90 mcg/actuation inhaler Inhale 2 puffs into the lungs every 6 (six) hours as needed for Wheezing. 1 each 11    albuterol (PROVENTIL/VENTOLIN HFA) 90 mcg/actuation inhaler test 18 g 0    albuterol (VENTOLIN HFA) 90 mcg/actuation inhaler Inhale 2 puffs into the lungs every 6 (six) hours as needed for Wheezing. Rescue 1 each 11    aspirin 325 MG tablet Take 325 mg by mouth once daily.      atorvastatin (LIPITOR) 40 MG tablet Take 1 tablet (40 mg total) by mouth once daily. 30 tablet 11    baclofen (LIORESAL) 20 MG tablet Take 1 tablet (20 mg total) by mouth 3 (three) times daily. 90 tablet 11    diclofenac sodium (VOLTAREN) 1 % Gel Apply 2 g topically once daily. 1 Tube 3    doxycycline (MONODOX) 100 MG capsule Take 1 capsule (100 mg total) by mouth 2 (two) times daily. 20 capsule 0    hydroCHLOROthiazide (HYDRODIURIL) 12.5 MG Tab Take 1 tablet (12.5 mg total) by mouth once daily. 30 tablet 0    sulfaSALAzine (AZULFIDINE) 500 MG TbEC TAKE 1 TABLET BY MOUTH TWICE DAILY FOR 1 WEEK, THEN 2 TABLETS TWICE DAILY FOR 1 WEEK, THEN 3 TABLETS BY MOUTH TWICE DAILY 225 tablet 1    venlafaxine (EFFEXOR-XR) 150 MG Cp24 Take 1 capsule (150 mg total) by mouth once daily. 30 capsule 11    amLODIPine (NORVASC) 10 MG tablet Take 1 tablet (10 mg total) by mouth once daily. 30 tablet 0    gabapentin (NEURONTIN) 300 MG capsule Take 2 capsules (600 mg total) by  mouth every evening. 60 capsule 5    lisinopril 10 MG tablet Take 1 tablet (10 mg total) by mouth once daily. 30 tablet 11    risperiDONE (RISPERDAL) 4 MG tablet Take 1 tablet (4 mg total) by mouth 2 (two) times daily. 60 tablet 3     No current facility-administered medications for this visit.        Review of patient's allergies indicates:   Allergen Reactions    Adhesive      Other reaction(s): Rash    Chloromycetin [chloramphenicol sod succinate] Hives    Etanercept      Other reaction(s): recurrent infections    Nickel sutures [surgical stainless steel]      Allergic contact dermatitis       Review of Systems   Constitutional: Negative for chills, fatigue and fever.   HENT: Positive for congestion, sinus pressure and sinus pain.    Eyes: Negative for pain and redness.   Respiratory: Negative for shortness of breath.    Cardiovascular: Negative for chest pain and leg swelling.   Gastrointestinal: Negative for abdominal distention, diarrhea and nausea.   Endocrine: Negative for polydipsia and polyphagia.   Genitourinary: Negative for difficulty urinating.   Musculoskeletal: Positive for arthralgias, back pain and joint swelling.   Skin: Positive for rash.   Neurological: Negative for dizziness.       Past Medical History:   Diagnosis Date    Allergy     Amblyopia     Anemia     Arthritis 02/02/1992    Cataract     Depression     Dry eyes     Dry mouth     Fibromyalgia 4/17/2014    Fibromyalgia     GERD (gastroesophageal reflux disease)     History of psychiatric hospitalization     Hyperlipidemia 02/02/1992    Hypertension     Kidney stone     Migraine headache     Osteoporosis     Psoriatic arthritis 02/02/1992    Right knee pain     post knee replacement surgery (possible rejectiion of metal)    RLS (restless legs syndrome)     Schizophrenia 02/02/1992    stable on meds    Urinary tract infection        Past Surgical History:   Procedure Laterality Date    ARTHROPLASTY, KNEE, TOTAL  Right 2013    Performed by Philipp Begum [facility] at [facility] OR 2ND FLR    Bilateral Sacroiliac Joint Injection - Per Dr Pineda, not necessary to hold ASA. Bilateral 2019    Performed by Michelle [facility]ineda Jr., [facility] at [facility] PAIN MGT    BLOCK, SACROILIAC JOINT-BILATERAL Bilateral 2018    Performed by Michelle [facility]ineda Jr., [facility] at [facility] PAIN MGT    BLOCK, SACROILIAC JOINT-Right- ORAL SEDATION Right 2018    Performed by Michelle [facility]ineda Jr., [facility] at [facility] PAIN MGT     SECTION      COLONOSCOPY N/A 2016    Performed by ANDRIA Connelly MD at [facility] ENDO (4TH FLR)    COLONOSCOPY N/A 2013    Performed by Didier Poole MD at [facility] ENDO (4TH FLR)    cyst removed from right sinus  1982    GRAFT-FEMORAL AUTOLOGOUS BONE GRAFTING Right 2014    Performed by [facility] at [facility] OR    HYSTERECTOMY      VAGINAL HYSTERECTOMY WITHOUT BSO - ENDOMETRIOSIS    JOINT REPLACEMENT Right     knee    KNEE SURGERY      REVISION-ARTHROPLASTY-KNEE-TOTAL Right 2014    Performed by Stone [facility] at [facility] OR    Surgery on right knee  1982    tumor removed from back left side upper shoulder  2006       Family History   Problem Relation Age of Onset    Colon cancer Mother     Psoriasis Mother     Cancer Mother         colon    Depression Mother     Diabetes Brother     Arthritis Brother     Heart disease Brother         cad    Cancer Father         lymphoma    Stroke Sister     No Known Problems Daughter     Hypertension Neg Hx     Suicide Neg Hx     Amblyopia Neg Hx     Blindness Neg Hx     Cataracts Neg Hx     Glaucoma Neg Hx     Macular degeneration Neg Hx     Retinal detachment Neg Hx     Strabismus Neg Hx        Social History     Tobacco Use    Smoking status: Former Smoker     Packs/day: 0.50     Years: 10.00     Pack years: 5.00     Types: Cigarettes     Last attempt to quit: 2012     Years since quittin.7    Smokeless  "tobacco: Former User    Tobacco comment: stopped smoking 2017.     Substance Use Topics    Alcohol use: No    Drug use: No       Objective:     BP 99/60   Pulse 82   Ht 5' 2.4" (1.585 m)   Wt 49.9 kg (110 lb)   BMI 19.86 kg/m²     Physical Exam   Constitutional: She is oriented to person, place, and time and well-developed, well-nourished, and in no distress.   HENT:   Head: Normocephalic and atraumatic.   Eyes: EOM are normal. Pupils are equal, round, and reactive to light.   Neck: Normal range of motion. Neck supple.   Cardiovascular: Normal rate and regular rhythm.    Pulmonary/Chest: Effort normal and breath sounds normal.   Abdominal: Soft. Bowel sounds are normal.       Right Side Rheumatological Exam     Examination finds the shoulder, elbow, wrist, knee, 1st PIP, 1st MCP, 2nd PIP, 4th PIP, 4th MCP, 5th PIP and 5th MCP normal.    The patient is tender to palpation of the 2nd MCP and 3rd MCP    She has swelling of the 2nd MCP and 3rd MCP    Muscle Strength (0-5 scale):  Deltoid:  5  Biceps: 5/5   Triceps:  5  : 5/5   Iliopsoas: 5  Quadriceps:  5   Distal Lower Extremity: 5    Left Side Rheumatological Exam     Examination finds the shoulder, elbow, wrist, knee, 3rd MCP, 4th PIP, 4th MCP, 5th PIP and 5th MCP normal.    The patient is tender to palpation of the 1st PIP, 1st MCP, 2nd PIP, 2nd MCP and 3rd PIP.    She has swelling of the 1st PIP, 1st MCP, 2nd PIP, 2nd MCP and 3rd PIP    Muscle Strength (0-5 scale):  Deltoid:  5  Biceps: 5/5   Triceps:  5  :  5/5   Iliopsoas: 5  Quadriceps:  5   Distal Lower Extremity: 5      Neurological: She is alert and oriented to person, place, and time.   Skin: Rash noted.     Peeling skin with erythema b/l heels consistent with plaque psoriasis   Psychiatric: Affect normal.     Results for HECTOR HAZEL (MRN [id number]) as of 2/14/2019 18:38   Ref. Range 2/8/2019 07:58   WBC Latest Ref Range: 3.90 - 12.70 K/uL 6.57   RBC Latest Ref Range: 4.00 - 5.40 M/uL 4.25 "   Hemoglobin Latest Ref Range: 12.0 - 16.0 g/dL 12.3   Hematocrit Latest Ref Range: 37.0 - 48.5 % 39.2   MCV Latest Ref Range: 82 - 98 fL 92   MCH Latest Ref Range: 27.0 - 31.0 pg 28.9   MCHC Latest Ref Range: 32.0 - 36.0 g/dL 31.4 (L)   RDW Latest Ref Range: 11.5 - 14.5 % 14.1   Platelets Latest Ref Range: 150 - 350 K/uL 330   MPV Latest Ref Range: 9.2 - 12.9 fL 8.9 (L)   Gran% Latest Ref Range: 38.0 - 73.0 % 70.3   Gran # (ANC) Latest Ref Range: 1.8 - 7.7 K/uL 4.6   Immature Granulocytes Latest Ref Range: 0.0 - 0.5 % 0.3   Immature Grans (Abs) Latest Ref Range: 0.00 - 0.04 K/uL 0.02   Lymph% Latest Ref Range: 18.0 - 48.0 % 22.7   Lymph # Latest Ref Range: 1.0 - 4.8 K/uL 1.5   Mono% Latest Ref Range: 4.0 - 15.0 % 5.3   Mono # Latest Ref Range: 0.3 - 1.0 K/uL 0.4   Eosinophil% Latest Ref Range: 0.0 - 8.0 % 0.8   Eos # Latest Ref Range: 0.0 - 0.5 K/uL 0.1   Basophil% Latest Ref Range: 0.0 - 1.9 % 0.6   Baso # Latest Ref Range: 0.00 - 0.20 K/uL 0.04   nRBC Latest Ref Range: 0 /100 WBC 0   Differential Method Unknown Automated   Results for HECTOR HAZEL (MRN [id number]) as of 2/14/2019 18:38   Ref. Range 2/8/2019 07:58   Sodium Latest Ref Range: 136 - 145 mmol/L 136   Potassium Latest Ref Range: 3.5 - 5.1 mmol/L 3.8   Chloride Latest Ref Range: 95 - 110 mmol/L 100   CO2 Latest Ref Range: 23 - 29 mmol/L 26   Anion Gap Latest Ref Range: 8 - 16 mmol/L 10   BUN, Bld Latest Ref Range: 8 - 23 mg/dL 7 (L)   Creatinine Latest Ref Range: 0.5 - 1.4 mg/dL 0.8   eGFR if non African American Latest Ref Range: >60 mL/min/1.73 m^2 >60.0   eGFR if African American Latest Ref Range: >60 mL/min/1.73 m^2 >60.0   Glucose Latest Ref Range: 70 - 110 mg/dL 179 (H)   Calcium Latest Ref Range: 8.7 - 10.5 mg/dL 9.8   Alkaline Phosphatase Latest Ref Range: 55 - 135 U/L 74   Total Protein Latest Ref Range: 6.0 - 8.4 g/dL 6.3   Albumin Latest Ref Range: 3.5 - 5.2 g/dL 3.4 (L)   Total Bilirubin Latest Ref Range: 0.1 - 1.0 mg/dL 0.2   AST Latest Ref  Range: 10 - 40 U/L 20   ALT Latest Ref Range: 10 - 44 U/L 15   CRP Latest Ref Range: 0.0 - 8.2 mg/L 2.7   Results for HECTOR HAZEL (MRN [id number]) as of 2/14/2019 18:38   Ref. Range 5/6/2014 14:49 7/14/2014 14:12 10/8/2014 14:03 2/29/2016 22:45 2/8/2019 07:58   Sed Rate Latest Ref Range: 0 - 36 mm/Hr 0 2 12 2 11   Results for HECTOR HAZEL (MRN [id number]) as of 2/14/2019 18:38   Ref. Range 5/6/2014 14:49 7/14/2014 14:12 10/8/2014 14:03 2/29/2016 22:45 2/8/2019 07:58   CRP Latest Ref Range: 0.0 - 8.2 mg/L 0.4 0.1 0.7 0.2 2.7     Results for HECTOR HAZEL (MRN [id number]) as of 2/14/2019 18:38   Ref. Range 5/6/2014 14:49   KRIS Screen Latest Ref Range: Negative <1:160  Negative <1:160   CCP Antibodies Latest Ref Range: <5.0 U/mL <0.5   Rheumatoid Factor Latest Ref Range: 0.0 - 15.0 IU/mL <10.0     Results for HECTOR HAZEL (MRN [id number]) as of 2/14/2019 18:38   Ref. Range 2/8/2019 07:58   Hep B Core Total Ab Unknown Negative   Hep B S Ab Unknown Negative   Hepatitis B Surface Ag Unknown Negative   Hepatitis C Ab Unknown Negative   Mitogen - Nil Latest Ref Range: See text IU/mL >10.000   NIL Latest Ref Range: See text IU/mL 0.030   TB Gold Plus Unknown Negative   TB1 - Nil Latest Ref Range: See text IU/mL 0.000   TB2 - Nil Latest Ref Range: See text IU/mL -0.010   HIV 1/2 Ag/Ab Latest Ref Range: Negative  Negative   RPR Latest Ref Range: Non-reactive  Non-reactive   Strongyloides Ab IgG Latest Ref Range: Negative  Negative   Varicella IgG Latest Ref Range: 0.00 - 0.90 ISR 3.49 (H)   Varicella Interpretation Latest Ref Range: Negative  Positive (A)   Results for HECTOR HAZEL (MRN [id number]) as of 2/14/2019 18:38   Ref. Range 5/6/2014 14:49   HLA B27 Result Unknown Negative     CT chest 8/2018:  FINDINGS:  Lungs: There are no abnormal opacities that require further evaluation.   The lungs show no findings consistent with emphysema.    Pleura:   No effusion..    Heart and pericardium: Normal size without  effusion.    Aorta and vasculature: Atherosclerosis of the aorta..    Chest wall and skeletal structures: Unremarkable except age-appropriate degenerative changes.    Upper abdomen: Unremarkable.    Pelvic xray 2/2018:  The pelvis x-rays show degenerative changes in sacroliac joints and possibly signs of inflammation right sacroiliac joint.    Assessment:     1. PSA (psoriatic arthritis)    2. Sacroiliac joint dysfunction    3. Fibromyalgia    4. Osteoarthritis, unspecified osteoarthritis type, unspecified site    5. Medication monitoring encounter      This is a 62 yo F with RLS, HTN, HLD, fibromyalgia, psoriatic arthritis, SI joint dysfunction who presents for follow-up. Continues to have pain and swelling involving multiple PIPs and MCPs of the left hand and a few on the right. Last seen in Rheumatology clinic in 2014 and then lost to follow-up. Previously psoriatic arthritis was well controlled with SSZ. This was re-started on 2/7/19. Today patient with TJC: 7, SJC: 7, PP: 7, Pt global: 6,  CRP: 2.7. DAPSA: 27 indicating moderate disease activity. Also continues to have b/l heel lesions consistent with psoriasis. Per chart review, patient took etanercept 50mg twice weekly for 3 months Rx by Josefina Vega in [facility] for psoriasis started 7/12/05. Then 50mg sc weekly until 4/2008 and stopped b/o recurrent infections requiring frequent holding of Enbrel dosing. She did not want to resume for this reason.  At this time, patient will need to be started on biologic for tx of PsA and psoriasis. Given recurrent infections on TNF, will move to IL-17 inhibitors such as Secukinumab. Will also continue SSZ with titration up to full dose.   SI joint dysfunction: follows with pain management. Pelvic xray from 2/8/19 shows DJD changes in SI joints and possible inflammation. HLAB27 checked in 2014 was negative. No inflammatory back pain currently. Will consider MRI for further work-up in future.   Plan:     - start on Secukinumab  with a loading dose: 150 mg at weeks 0, 1, 2, 3, and 4 followed by 150 mg every 4 weeks. Prescription sent to [facility]. Do not start until completion of antibiotics.  - hand out given on Secukinumab and PsA  - continue SSZ with titration up to full dose, 1500 mg twice daily  - try Voltaren gel 1% up to 4 times a day over affected small joints; prescription sent to [facility].   - can take Tylenol extra strength up 3g/d  - avoid NSAIDs  - ID consult for vaccinations  - OT   - continue using brace     RTC in 2 months    CC: Sadaf Vargas MD

## 2019-02-15 ENCOUNTER — PATIENT MESSAGE (OUTPATIENT)
Dept: RHEUMATOLOGY | Facility: CLINIC | Age: 64
End: 2019-02-15

## 2019-02-15 DIAGNOSIS — E78.5 HYPERLIPIDEMIA, UNSPECIFIED HYPERLIPIDEMIA TYPE: ICD-10-CM

## 2019-02-15 NOTE — PATIENT INSTRUCTIONS
1. Complete antibiotic course. Once completed, can start on Secukinumab injections, once a week for 5 weeks and then every 4 weeks. Prescription sent to pharmacy.  2. Continue Sulfasalazine. Today start 1000 mg twice daily x 1 week and then increase to 1500 mg twice daily and stay on this dose.

## 2019-02-17 DIAGNOSIS — E78.5 HYPERLIPIDEMIA, UNSPECIFIED HYPERLIPIDEMIA TYPE: ICD-10-CM

## 2019-02-17 RX ORDER — ATORVASTATIN CALCIUM 40 MG/1
TABLET, FILM COATED ORAL
Qty: 90 TABLET | Refills: 0 | Status: SHIPPED | OUTPATIENT
Start: 2019-02-17 | End: 2019-05-16 | Stop reason: SDUPTHER

## 2019-02-17 RX ORDER — ATORVASTATIN CALCIUM 40 MG/1
TABLET, FILM COATED ORAL
Qty: 30 TABLET | Refills: 0 | Status: SHIPPED | OUTPATIENT
Start: 2019-02-17 | End: 2019-02-17 | Stop reason: SDUPTHER

## 2019-02-18 ENCOUNTER — TELEPHONE (OUTPATIENT)
Dept: PHARMACY | Facility: CLINIC | Age: 64
End: 2019-02-18

## 2019-02-19 ENCOUNTER — PATIENT MESSAGE (OUTPATIENT)
Dept: RHEUMATOLOGY | Facility: CLINIC | Age: 64
End: 2019-02-19

## 2019-02-21 ENCOUNTER — OFFICE VISIT (OUTPATIENT)
Dept: INTERNAL MEDICINE | Facility: CLINIC | Age: 64
End: 2019-02-21
Payer: MEDICARE

## 2019-02-21 VITALS
OXYGEN SATURATION: 97 % | HEIGHT: 62 IN | BODY MASS INDEX: 19.53 KG/M2 | HEART RATE: 75 BPM | SYSTOLIC BLOOD PRESSURE: 100 MMHG | WEIGHT: 106.13 LBS | DIASTOLIC BLOOD PRESSURE: 50 MMHG

## 2019-02-21 DIAGNOSIS — F33.42 RECURRENT MAJOR DEPRESSIVE DISORDER, IN FULL REMISSION: ICD-10-CM

## 2019-02-21 DIAGNOSIS — G89.4 CHRONIC PAIN SYNDROME: ICD-10-CM

## 2019-02-21 DIAGNOSIS — G25.81 RESTLESS LEGS: ICD-10-CM

## 2019-02-21 DIAGNOSIS — M79.18 MYOFASCIAL PAIN SYNDROME: ICD-10-CM

## 2019-02-21 DIAGNOSIS — I10 ESSENTIAL HYPERTENSION: Chronic | ICD-10-CM

## 2019-02-21 DIAGNOSIS — M81.0 OSTEOPOROSIS, POST-MENOPAUSAL: ICD-10-CM

## 2019-02-21 DIAGNOSIS — L40.50 PSA (PSORIATIC ARTHRITIS): ICD-10-CM

## 2019-02-21 DIAGNOSIS — R60.9 EDEMA, UNSPECIFIED TYPE: Primary | ICD-10-CM

## 2019-02-21 DIAGNOSIS — F20.9 SCHIZOPHRENIA, UNSPECIFIED TYPE: ICD-10-CM

## 2019-02-21 PROCEDURE — 99999 PR PBB SHADOW E&M-EST. PATIENT-LVL III: ICD-10-PCS | Mod: PBBFAC,,, | Performed by: INTERNAL MEDICINE

## 2019-02-21 PROCEDURE — 3008F PR BODY MASS INDEX (BMI) DOCUMENTED: ICD-10-PCS | Mod: CPTII,S$GLB,, | Performed by: INTERNAL MEDICINE

## 2019-02-21 PROCEDURE — 3078F DIAST BP <80 MM HG: CPT | Mod: CPTII,S$GLB,, | Performed by: INTERNAL MEDICINE

## 2019-02-21 PROCEDURE — 3074F PR MOST RECENT SYSTOLIC BLOOD PRESSURE < 130 MM HG: ICD-10-PCS | Mod: CPTII,S$GLB,, | Performed by: INTERNAL MEDICINE

## 2019-02-21 PROCEDURE — 1101F PT FALLS ASSESS-DOCD LE1/YR: CPT | Mod: CPTII,S$GLB,, | Performed by: INTERNAL MEDICINE

## 2019-02-21 PROCEDURE — 99499 UNLISTED E&M SERVICE: CPT | Mod: S$GLB,,, | Performed by: INTERNAL MEDICINE

## 2019-02-21 PROCEDURE — 99999 PR PBB SHADOW E&M-EST. PATIENT-LVL III: CPT | Mod: PBBFAC,,, | Performed by: INTERNAL MEDICINE

## 2019-02-21 PROCEDURE — 3078F PR MOST RECENT DIASTOLIC BLOOD PRESSURE < 80 MM HG: ICD-10-PCS | Mod: CPTII,S$GLB,, | Performed by: INTERNAL MEDICINE

## 2019-02-21 PROCEDURE — 99499 RISK ADDL DX/OHS AUDIT: ICD-10-PCS | Mod: S$GLB,,, | Performed by: INTERNAL MEDICINE

## 2019-02-21 PROCEDURE — 3074F SYST BP LT 130 MM HG: CPT | Mod: CPTII,S$GLB,, | Performed by: INTERNAL MEDICINE

## 2019-02-21 PROCEDURE — 3008F BODY MASS INDEX DOCD: CPT | Mod: CPTII,S$GLB,, | Performed by: INTERNAL MEDICINE

## 2019-02-21 PROCEDURE — 99214 OFFICE O/P EST MOD 30 MIN: CPT | Mod: S$GLB,,, | Performed by: INTERNAL MEDICINE

## 2019-02-21 PROCEDURE — 1101F PR PT FALLS ASSESS DOC 0-1 FALLS W/OUT INJ PAST YR: ICD-10-PCS | Mod: CPTII,S$GLB,, | Performed by: INTERNAL MEDICINE

## 2019-02-21 PROCEDURE — 99214 PR OFFICE/OUTPT VISIT, EST, LEVL IV, 30-39 MIN: ICD-10-PCS | Mod: S$GLB,,, | Performed by: INTERNAL MEDICINE

## 2019-02-21 RX ORDER — HEPARIN 100 UNIT/ML
500 SYRINGE INTRAVENOUS
Status: CANCELLED | OUTPATIENT
Start: 2019-02-21

## 2019-02-21 RX ORDER — ZOLEDRONIC ACID 5 MG/100ML
5 INJECTION, SOLUTION INTRAVENOUS
Status: CANCELLED | OUTPATIENT
Start: 2019-02-21

## 2019-02-21 RX ORDER — ACETAMINOPHEN 500 MG
500 TABLET ORAL
Status: CANCELLED | OUTPATIENT
Start: 2019-02-21

## 2019-02-21 RX ORDER — GABAPENTIN 300 MG/1
300 CAPSULE ORAL NIGHTLY
Qty: 60 CAPSULE | Refills: 5
Start: 2019-02-21 | End: 2019-11-26 | Stop reason: SDUPTHER

## 2019-02-21 RX ORDER — AMLODIPINE BESYLATE 5 MG/1
5 TABLET ORAL DAILY
Qty: 30 TABLET | Refills: 0
Start: 2019-02-21 | End: 2019-11-03

## 2019-02-21 RX ORDER — TRAMADOL HYDROCHLORIDE 50 MG/1
50 TABLET ORAL EVERY 12 HOURS PRN
Qty: 60 TABLET | Refills: 0 | Status: SHIPPED | OUTPATIENT
Start: 2019-02-21 | End: 2019-03-19 | Stop reason: SDUPTHER

## 2019-02-21 RX ORDER — SODIUM CHLORIDE 0.9 % (FLUSH) 0.9 %
10 SYRINGE (ML) INJECTION
Status: CANCELLED | OUTPATIENT
Start: 2019-02-21

## 2019-02-21 NOTE — PROGRESS NOTES
Subjective:       Patient ID: Bhavna Landry is a 63 y.o. female.    Chief Complaint: Follow-up (for meds review)    HPI   C/o thick clear sputum production.  Taking mucinex and claritin.  Was prescribed abx.  Continued wheezing when supine, for which she uses inhaler.    She comes for f/u of complaint of edema.  She takes hctz only prn edema, typically once or twice a week.    H/o htn - BP quite low today.      She is seeing rheum for psoriatic arthritis.  She has a complex pain syndrome due to OA, PA and fibromyalgia.  She takes tramadol once- twice a day, typically just once a day.  Taking gabapentin 300 mg  At night only.  Higher doses cause fatigue.  Recent Si injection not helpful    She has a h/o depression, in full remission.  Schizophrenia, chronic, is also stable., with risperdal.    We started fosamax for osteoporosis, in august 2018, but she stopped due to nausea.  Review of Systems   Constitutional: Negative for fever and unexpected weight change.   HENT: Negative for congestion and postnasal drip.    Eyes: Negative for pain, discharge and visual disturbance.   Respiratory: Negative for cough, chest tightness, shortness of breath and wheezing.    Cardiovascular: Negative for chest pain and leg swelling.   Gastrointestinal: Negative for abdominal pain, constipation, diarrhea and nausea.   Genitourinary: Negative for difficulty urinating, dysuria and hematuria.   Musculoskeletal: Positive for arthralgias and back pain.   Skin: Negative for rash.   Neurological: Negative for headaches.   Psychiatric/Behavioral: Negative for dysphoric mood and sleep disturbance. The patient is not nervous/anxious.        Objective:      Physical Exam   Constitutional: She is oriented to person, place, and time. She appears well-developed and well-nourished. No distress.   Pulmonary/Chest: Effort normal and breath sounds normal.   Musculoskeletal: She exhibits no edema.   Neurological: She is alert and oriented to person,  place, and time.   Psychiatric: She has a normal mood and affect. Her behavior is normal.       Assessment:       1. Edema, unspecified type    2. Recurrent major depressive disorder, in full remission    3. Schizophrenia, unspecified type    4. PSA (psoriatic arthritis)    5. Myofascial pain syndrome    6. Essential hypertension    7. Chronic pain syndrome    8. Osteoporosis, post-menopausal        Plan:       Bhavna was seen today for follow-up.    Diagnoses and all orders for this visit:    Edema, unspecified type    Recurrent major depressive disorder, in full remission    Schizophrenia, unspecified type    PSA (psoriatic arthritis)    Myofascial pain syndrome    Essential hypertension    Chronic pain syndrome    Osteoporosis, post-menopausal  -     Ambulatory Referral to Infusion Dept    Other orders  -     acetaminophen tablet 500 mg  -     zoledronic acid-mannitol & water 5 mg/100 mL infusion 5 mg  -     sodium chloride 0.9% flush 10 mL  -     heparin, porcine (PF) 100 unit/mL injection flush 500 Units       Stop lisinopril. Monitor bp.  Restart if necessary.

## 2019-02-21 NOTE — PATIENT INSTRUCTIONS
Stop lisinopril 10 mg.   Monitor bp.  Restart if necessary.    Reclast infusion for osteoporosis.

## 2019-02-25 ENCOUNTER — TELEPHONE (OUTPATIENT)
Dept: OPHTHALMOLOGY | Facility: CLINIC | Age: 64
End: 2019-02-25

## 2019-02-26 ENCOUNTER — TELEPHONE (OUTPATIENT)
Dept: INTERNAL MEDICINE | Facility: CLINIC | Age: 64
End: 2019-02-26

## 2019-02-26 ENCOUNTER — OFFICE VISIT (OUTPATIENT)
Dept: PSYCHIATRY | Facility: CLINIC | Age: 64
End: 2019-02-26
Payer: COMMERCIAL

## 2019-02-26 ENCOUNTER — TELEPHONE (OUTPATIENT)
Dept: OPHTHALMOLOGY | Facility: CLINIC | Age: 64
End: 2019-02-26

## 2019-02-26 VITALS
DIASTOLIC BLOOD PRESSURE: 58 MMHG | HEART RATE: 83 BPM | WEIGHT: 109.56 LBS | HEIGHT: 62 IN | BODY MASS INDEX: 20.16 KG/M2 | SYSTOLIC BLOOD PRESSURE: 124 MMHG

## 2019-02-26 DIAGNOSIS — G47.00 INSOMNIA, UNSPECIFIED TYPE: ICD-10-CM

## 2019-02-26 DIAGNOSIS — F33.42 RECURRENT MAJOR DEPRESSIVE DISORDER, IN FULL REMISSION: ICD-10-CM

## 2019-02-26 DIAGNOSIS — F20.9 SCHIZOPHRENIA, CHRONIC CONDITION WITH ACUTE EXACERBATION: Primary | Chronic | ICD-10-CM

## 2019-02-26 PROCEDURE — 1101F PR PT FALLS ASSESS DOC 0-1 FALLS W/OUT INJ PAST YR: ICD-10-PCS | Mod: CPTII,S$GLB,, | Performed by: INTERNAL MEDICINE

## 2019-02-26 PROCEDURE — 1101F PT FALLS ASSESS-DOCD LE1/YR: CPT | Mod: CPTII,S$GLB,, | Performed by: INTERNAL MEDICINE

## 2019-02-26 PROCEDURE — 3078F DIAST BP <80 MM HG: CPT | Mod: CPTII,S$GLB,, | Performed by: INTERNAL MEDICINE

## 2019-02-26 PROCEDURE — 3008F BODY MASS INDEX DOCD: CPT | Mod: CPTII,S$GLB,, | Performed by: INTERNAL MEDICINE

## 2019-02-26 PROCEDURE — 99214 PR OFFICE/OUTPT VISIT, EST, LEVL IV, 30-39 MIN: ICD-10-PCS | Mod: S$GLB,,, | Performed by: INTERNAL MEDICINE

## 2019-02-26 PROCEDURE — 99999 PR PBB SHADOW E&M-EST. PATIENT-LVL III: CPT | Mod: PBBFAC,,, | Performed by: INTERNAL MEDICINE

## 2019-02-26 PROCEDURE — 3074F PR MOST RECENT SYSTOLIC BLOOD PRESSURE < 130 MM HG: ICD-10-PCS | Mod: CPTII,S$GLB,, | Performed by: INTERNAL MEDICINE

## 2019-02-26 PROCEDURE — 99214 OFFICE O/P EST MOD 30 MIN: CPT | Mod: S$GLB,,, | Performed by: INTERNAL MEDICINE

## 2019-02-26 PROCEDURE — 99999 PR PBB SHADOW E&M-EST. PATIENT-LVL III: ICD-10-PCS | Mod: PBBFAC,,, | Performed by: INTERNAL MEDICINE

## 2019-02-26 PROCEDURE — 3074F SYST BP LT 130 MM HG: CPT | Mod: CPTII,S$GLB,, | Performed by: INTERNAL MEDICINE

## 2019-02-26 PROCEDURE — 3008F PR BODY MASS INDEX (BMI) DOCUMENTED: ICD-10-PCS | Mod: CPTII,S$GLB,, | Performed by: INTERNAL MEDICINE

## 2019-02-26 PROCEDURE — 3078F PR MOST RECENT DIASTOLIC BLOOD PRESSURE < 80 MM HG: ICD-10-PCS | Mod: CPTII,S$GLB,, | Performed by: INTERNAL MEDICINE

## 2019-02-26 RX ORDER — RISPERIDONE 2 MG/1
2 TABLET ORAL NIGHTLY
Qty: 30 TABLET | Refills: 11 | Status: SHIPPED | OUTPATIENT
Start: 2019-02-26 | End: 2019-04-15 | Stop reason: SDUPTHER

## 2019-02-26 RX ORDER — RISPERIDONE 4 MG/1
4 TABLET ORAL DAILY
Qty: 30 TABLET | Refills: 11 | Status: SHIPPED | OUTPATIENT
Start: 2019-02-26 | End: 2019-03-28

## 2019-02-26 NOTE — TELEPHONE ENCOUNTER
----- Message from Hannah Grant MA sent at 2/25/2019  3:36 PM CST -----      ----- Message -----  From: Octavio Orozco  Sent: 2/25/2019   3:32 PM  To: Alicia CASTANEDA Staff    Patient is having brow ptosis repair on 3/4/2019 in the minor room under local. Dr. Vargas seen patient on 2/21/2019. I'm not sure if patient made the doctor aware of this at her last visit. Is patient cleared for sx?      Octavio ELIZABETH, OT

## 2019-02-26 NOTE — TELEPHONE ENCOUNTER
She is cleared for lid surgery.    She needs to ask her rheumatologist about back brace, as I have no idea what kind to order, or if it would be helpful

## 2019-02-26 NOTE — PROGRESS NOTES
OUTPATIENT PSYCHIATRY RETURN VISIT    ENCOUNTER DATE:  2/26/2019  SITE:  [facility], [facility]  LENGTH OF SESSION:  20 minutes    CHIEF COMPLAINT:  Follow-up    HISTORY OF PRESENTING ILLNESS:  Bhavna Landry is a 63 y.o. female with history of Paranoid Schizophrenia, Depression, and Insomnia who presents for follow up appointment.     Plan at last appointment on 12/19/2018:  · Patient tolerating Risperdal 3mg BID very well.  She continues to be improving, however is not back at baseline.  Dose just increased from 2mg BID to 3mg BID last night, so will give her a few more days on this dose.  Will call daughter on Friday to decide about continuing 3mg BID versus increasing to 4mg BID.    · No EPS on interview today.  Continue Benadryl 50mg qHS.  · Continue Effexor 150mg daily for depression.  · Continue Neurontin 600mg qHS for restless leg.   Risperdal increased to 4mg BID on 12/21/18.    History as told by patient and daughter:  Says she was taking Risperdal 4mg BID x 2 months.  Started having some weakness a few weeks ago - PCP realized it was hypotension and lisinopril was stopped.  Patient reports at that time she decided to decrease Risperdal to 4mg/2mg and she is still taking this dose.  Daughter feels she was doing extremely well on 4mg BID.  Still has no concerns on lower dose.  Says she is just not quite as engaging as she was on higher dose.  Patient would like to continue the 4mg/2mg dose for now and daughter is fine with this.  No recent paranoia, AVH, depression, anxiety, padmini, SI, or HI.  They deny side effects from the Risperdal.  Patient says she has a habit of moving her tongue against her front teeth but that she can stop it if she wants to.  Says this was present prior to medication change.  Daughter reports patient has always done that with her tongue.      Medication side effects:  No  Medication compliance:  Yes    PSYCHIATRIC REVIEW OF SYSTEMS:  Trouble with sleep:   Denies  Appetite changes:  Denies  Weight changes:  Denies  Lack of energy:  Denies  Anhedonia:  Denies  Somatic symptoms:  Denies  Libido:  Denies  Anxiety/panic:  Denies  Guilty/hopeless:  Denies  Self-injurious behavior/risky behavior:  Denies  Any drugs:  Denies  Alcohol:  Denies    MEDICAL REVIEW OF SYSTEMS:  Complete review of systems performed covering Constitutional, Musculoskeletal, Neurologic.  All systems negative except for that covered in HPI.    PAST PSYCHIATRIC, MEDICAL, AND SOCIAL HISTORY REVIEWED  The patient's past medical, family and social history have been reviewed and updated as appropriate within the electronic medical record - see encounter notes.    MEDICATIONS:    Current Outpatient Medications:     albuterol (PROVENTIL/VENTOLIN HFA) 90 mcg/actuation inhaler, Inhale 2 puffs into the lungs every 6 (six) hours as needed for Wheezing., Disp: 1 each, Rfl: 11    albuterol (VENTOLIN HFA) 90 mcg/actuation inhaler, Inhale 2 puffs into the lungs every 6 (six) hours as needed for Wheezing. Rescue, Disp: 1 each, Rfl: 11    amLODIPine (NORVASC) 5 MG tablet, Take 1 tablet (5 mg total) by mouth once daily., Disp: 30 tablet, Rfl: 0    aspirin 325 MG tablet, Take 325 mg by mouth once daily., Disp: , Rfl:     atorvastatin (LIPITOR) 40 MG tablet, TAKE 1 TABLET(40 MG) BY MOUTH EVERY DAY, Disp: 90 tablet, Rfl: 0    baclofen (LIORESAL) 20 MG tablet, Take 1 tablet (20 mg total) by mouth 3 (three) times daily., Disp: 90 tablet, Rfl: 11    diclofenac sodium (VOLTAREN) 1 % Gel, Apply 2 g topically once daily., Disp: 1 Tube, Rfl: 3    gabapentin (NEURONTIN) 300 MG capsule, Take 1 capsule (300 mg total) by mouth every evening., Disp: 60 capsule, Rfl: 5    hydroCHLOROthiazide (HYDRODIURIL) 12.5 MG Tab, Take 1 tablet (12.5 mg total) by mouth once daily., Disp: 30 tablet, Rfl: 0    risperiDONE (RISPERDAL) 2 MG tablet, Take 1 tablet (2 mg total) by mouth every evening., Disp: 30 tablet, Rfl: 11    risperiDONE  "(RISPERDAL) 4 MG tablet, Take 1 tablet (4 mg total) by mouth once daily., Disp: 30 tablet, Rfl: 11    secukinumab (COSENTYX PEN) 150 mg/mL PnIj, Inject 150 mg into the skin once a week for 5 weeks, Disp: 5 mL, Rfl: 0    secukinumab (COSENTYX PEN) 150 mg/mL PnIj, Inject 150 mg into the skin every 4 weeks, Disp: 1 mL, Rfl: 0    sulfaSALAzine (AZULFIDINE) 500 MG TbEC, TAKE 1 TABLET BY MOUTH TWICE DAILY FOR 1 WEEK, THEN 2 TABLETS TWICE DAILY FOR 1 WEEK, THEN 3 TABLETS BY MOUTH TWICE DAILY, Disp: 225 tablet, Rfl: 1    traMADol (ULTRAM) 50 mg tablet, Take 1 tablet (50 mg total) by mouth every 12 (twelve) hours as needed for Pain., Disp: 60 tablet, Rfl: 0    venlafaxine (EFFEXOR-XR) 150 MG Cp24, Take 1 capsule (150 mg total) by mouth once daily., Disp: 30 capsule, Rfl: 11    ALLERGIES:  Review of patient's allergies indicates:   Allergen Reactions    Adhesive      Other reaction(s): Rash    Chloromycetin [chloramphenicol sod succinate] Hives    Etanercept      Other reaction(s): recurrent infections    Nickel sutures [surgical stainless steel]      Allergic contact dermatitis     PSYCHIATRIC EXAM:  Vitals:    02/26/19 1330   BP: (!) 124/58   Pulse: 83   Weight: 49.7 kg (109 lb 9.1 oz)   Height: 5' 2" (1.575 m)     Appearance:  Well groomed, appearing healthy and of stated age  Behavior:  Cooperative, pleasant, no psychomotor agitation or retardation  Speech:  Normal rate, rhythm, prosody, and volume  Mood:  "Good"  Affect:  Congruent, smiling  Thought Process:  At times relatively logical and other times disorganized  Thought Content:  Negative for suicidal ideation, homicidal ideation.  Not responding to unseen others.  Some paranoia.   Associations:  Loose  Memory:  Poor  Level of Consciousness/Orientation:  Grossly intact  Fund of Knowledge:  Fair  Attention:  Good  Language:  Fluent, able to name abstract and concrete objects  Insight:  Poor due to severe mental illness  Judgment:  Intact  Psychomotor signs:  " No involuntary movements or tremor, No rigidity on exam  Gait:  Normal    RELEVANT LABS/STUDIES:    Lab Results   Component Value Date    WBC 6.57 02/08/2019    HGB 12.3 02/08/2019    HCT 39.2 02/08/2019    MCV 92 02/08/2019     02/08/2019     BMP  Lab Results   Component Value Date     02/08/2019    K 3.8 02/08/2019     02/08/2019    CO2 26 02/08/2019    BUN 7 (L) 02/08/2019    CREATININE 0.8 02/08/2019    CALCIUM 9.8 02/08/2019    ANIONGAP 10 02/08/2019    ESTGFRAFRICA >60.0 02/08/2019    EGFRNONAA >60.0 02/08/2019     Lab Results   Component Value Date    ALT 15 02/08/2019    AST 20 02/08/2019    GGT 91 (H) 07/28/2014    ALKPHOS 74 02/08/2019    BILITOT 0.2 02/08/2019     Lab Results   Component Value Date    TSH 2.745 12/11/2018     Lab Results   Component Value Date    HGBA1C 5.3 12/11/2018       IMPRESSION:    Bhavna Landry is a 63 y.o. female with history of history of Paranoid Schizophrenia, Depression, and Insomnia who presents for follow up appointment.    Status/Progress:  Based on the examination today, the patient's problem(s) is/are improving but still inadequately controlled.  New problems have not been presented today.     Risk Parameters:  Patient reports no suicidal ideation  Patient reports no homicidal ideation  Patient reports no self-injurious behavior  Patient reports no violent behavior    DIAGNOSES:    ICD-10-CM ICD-9-CM   1. Schizophrenia, chronic condition with acute exacerbation F20.9 295.94     PLAN:  · Patient prefers to continue Risperdal 4mg/2mg for now.  Can always increase back to 4mg BID for worsening symptoms in the future.    · No EPS on interview today.  Continue Benadryl 50mg qHS.  · Continue Effexor 150mg daily for depression.  · Continue Neurontin 300mg qHS for restless leg and sleep.    · Discussed with patient and daughter informed consent, risks versus benefits, alternative treatments, side effect profile and the inherent unpredictability of individual  responses to these treatments.  The patient and daughter express understanding of the above and display the capacity to agree with this current plan.    RETURN TO CLINIC:  Follow-up in about 4 months (around 6/26/2019).

## 2019-02-26 NOTE — TELEPHONE ENCOUNTER
----- Message from Catracho Hatfield sent at 2/26/2019 10:47 AM CST -----  Contact: Self [phone]  Pt states the department of Ophthalmology need Dr. Vargas approval for an eyebrow lift on Monday 3/4 and also need a prescription for back brace.

## 2019-02-28 ENCOUNTER — TELEPHONE (OUTPATIENT)
Dept: PAIN MEDICINE | Facility: CLINIC | Age: 64
End: 2019-02-28

## 2019-02-28 NOTE — TELEPHONE ENCOUNTER
Returned call informed pt that she will need to come in to be assessed for back brace. S/f visit.      ----- Message from Frida Brennan sent at 2/28/2019  3:24 PM CST -----  Contact: [phone]/self  Patient is requesting a call back. She would like a prescription written for a lower back brace. Please advise.

## 2019-03-01 ENCOUNTER — TELEPHONE (OUTPATIENT)
Dept: OPHTHALMOLOGY | Facility: CLINIC | Age: 64
End: 2019-03-01

## 2019-03-04 ENCOUNTER — PROCEDURE VISIT (OUTPATIENT)
Dept: OPHTHALMOLOGY | Facility: CLINIC | Age: 64
End: 2019-03-04
Payer: MEDICARE

## 2019-03-04 VITALS — DIASTOLIC BLOOD PRESSURE: 77 MMHG | SYSTOLIC BLOOD PRESSURE: 160 MMHG | HEART RATE: 76 BPM

## 2019-03-04 DIAGNOSIS — H33.311 RETINAL TEAR OF RIGHT EYE: ICD-10-CM

## 2019-03-04 DIAGNOSIS — H57.811 BROW PTOSIS, RIGHT: Primary | ICD-10-CM

## 2019-03-04 DIAGNOSIS — H35.62 RETINAL HEMORRHAGE, LEFT: ICD-10-CM

## 2019-03-04 DIAGNOSIS — H50.811 DUANE'S SYNDROME OF RIGHT EYE: ICD-10-CM

## 2019-03-04 DIAGNOSIS — H43.813 POSTERIOR VITREOUS DETACHMENT, BOTH EYES: ICD-10-CM

## 2019-03-04 PROCEDURE — 67900 PR REPAIR BROW PTOSIS: ICD-10-PCS | Mod: RT,S$GLB,, | Performed by: OPHTHALMOLOGY

## 2019-03-04 PROCEDURE — 67900 REPAIR BROW DEFECT: CPT | Mod: RT,S$GLB,, | Performed by: OPHTHALMOLOGY

## 2019-03-04 PROCEDURE — 99499 NO LOS: ICD-10-PCS | Mod: S$GLB,,, | Performed by: OPHTHALMOLOGY

## 2019-03-04 PROCEDURE — 99499 UNLISTED E&M SERVICE: CPT | Mod: S$GLB,,, | Performed by: OPHTHALMOLOGY

## 2019-03-04 RX ORDER — DIAZEPAM 5 MG/1
5 TABLET ORAL
Status: DISCONTINUED | OUTPATIENT
Start: 2019-03-04 | End: 2019-03-04

## 2019-03-04 RX ORDER — HYDROCODONE BITARTRATE AND ACETAMINOPHEN 5; 325 MG/1; MG/1
1 TABLET ORAL EVERY 6 HOURS PRN
Qty: 16 TABLET | Refills: 0 | Status: SHIPPED | OUTPATIENT
Start: 2019-03-04 | End: 2019-04-23

## 2019-03-04 RX ORDER — CEPHALEXIN 500 MG/1
500 CAPSULE ORAL 4 TIMES DAILY
Qty: 28 CAPSULE | Refills: 0 | Status: SHIPPED | OUTPATIENT
Start: 2019-03-04 | End: 2019-03-11

## 2019-03-04 RX ORDER — DIAZEPAM 5 MG/1
5 TABLET ORAL
Status: COMPLETED | OUTPATIENT
Start: 2019-03-04 | End: 2019-03-04

## 2019-03-04 RX ADMIN — DIAZEPAM 5 MG: 5 TABLET ORAL at 11:03

## 2019-03-04 NOTE — PROGRESS NOTES
HPI     Patient here for right brow ptosis repair. Patient last took aspirin on   Saturday, 3/2/2019.    S/p cyst removed from right sinus (7/9/1982).    Hx of brow ptosis (right), retinal tear of right eye, retinal hemorrhage   (left), duane's syndrome of right eye, PVD ou, migraines w/out aura and   w/out status migrainosus (not intractable), hypertension, dry eyes,   cataract.    Last edited by Octavio Orozco on 3/4/2019 10:52 AM. (History)            Assessment /Plan     For exam results, see Encounter Report.    Brow ptosis, right  -     Discontinue: diazePAM tablet 5 mg    Retinal tear of right eye    Retinal hemorrhage, left    Duane's syndrome of right eye    Posterior vitreous detachment, both eyes    Other orders  -     diazePAM tablet 5 mg  -     cephALEXin (KEFLEX) 500 MG capsule; Take 1 capsule (500 mg total) by mouth 4 (four) times daily. for 7 days  Dispense: 28 capsule; Refill: 0  -     HYDROcodone-acetaminophen (NORCO) 5-325 mg per tablet; Take 1 tablet by mouth every 6 (six) hours as needed for Pain.  Dispense: 16 tablet; Refill: 0  -     tobramycin-dexamethasone 0.3-0.1% (TOBRADEX) 0.3-0.1 % Oint; Place a thin ribbon of ointment to suture line 3 times daily.  Dispense: 3.5 g; Refill: 0      Here for right brow lift.     Indications for surgery: 63 y.o. female with significant impairment of right superior visual fields. Informed consent obtained after extensive risks/benefits/alternatives were discussed with the patient including but not limited to pain, bleeding, infection, ocular injury, loss of the eye, asymmetry, need for revision in future, scarring.  Alternatives such as waiting were discussed.  All questions were answered.      Attention was turned to the right brow. The epidermis and dermis were incised along the markings using a #15 scalpel down to the level of the frontalis. Adson forceps were used for distraction, and monopolar cautery was used to remove the tissue. The deep and  subcutaneous tissues were closed with buried 5-0 Vicryl on a P-2 needle. Closure was completed using interrupted 6-0 Prolene on a P-3 vertical mattress sutures. The patient tolerated the procedure well.  There were no complications.    Post-operative instructions were given to the patient.     Return in one week sooner any worsening

## 2019-03-06 DIAGNOSIS — L40.50 PSA (PSORIATIC ARTHRITIS): ICD-10-CM

## 2019-03-07 NOTE — TELEPHONE ENCOUNTER
DOCUMENTATION ONLY:  [employer] approved via appeal process.   Dates: 3/6/19 through 12/31/19  Case ID: [id number]  Co pay: $80.00  Co pay assistance IS required.   Forwarded to [facility] financial assistance team for review. CEB

## 2019-03-08 NOTE — PROGRESS NOTES
[facility] Established Patient Evaluation    Referred by: Sadaf Vargas MD  Reason for referral:M54.9,G89.29 (ICD-10-CM) - Chronic right-sided back pain, unspecified back location    CC:   Chief Complaint   Patient presents with    Hip Pain     bilateral    Low-back Pain       Last 3 PDI Scores 3/11/2019 1/28/2019 9/7/2018   Pain Disability Index (PDI) 13 29 32     Interval Update:   3/11/19 - Pt returns for follow up she is S/P BILATERAL Sacroiliac Joint Injection on 2/7/19 with 90 % relief for three days. Pt reports 6/10 low back and bilateral hip pain.  She is requesting a brace for the low back to help facilitate participation in ADLs that require extended walking.    1/29/19 - Pt returns for follow up she reports 8/10 low back  bilateral hip pain.  She reports return of her low back pain in the area of the SI joint 1-2 weeks ago.  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    9/7/18 - Pt returns to clinic reporting severely exacerbated low back pain bilaterally following the last session of physical therapy.  She endorses sharp, achy, stabbing pain in the areas of the SI joints and would like to repeat the SI injection she received a few weeks ago.  She reported 100% relief of lbp following the SI injection that last up until a few days ago when she experienced the exacerbation.  Overall, her PDI is improved which shows progress.    Background:  Bhavna Landry is a 63 y.o. female referred for right sided back pain to right upper buttocks.  She points to the middle of the right buttock 1-2 inches to the right of midline.    Location: back  Severity: Currently: 7/10   Typical Range: 7/10     Exacerbation: 7/10   Onset: 3-4 months  Quality: Burning and constant  Radiation: none  Axial/Extremity Percentage of Pain: 100%  Exacerbating Factors: prolonged sitting, standing, rolling over in bed  Mitigating Factors: aleve  Assoc: denies night fever/night sweats, urinary incontinence,  bowel incontinence, significant weight loss, significant motor weakness and loss of sensations    Previous Therapies:  PT:  Scheduled to start this coming Friday 8/17/18  HEP:  tries  TENS: none  Injections: Low Back 10-15 yrs ago, with relief - unsure of which injection she had  Surgery:    Right TKA and revision due to nickel allergy  Medications:   - NSAIDS: Yes  - MSK Relaxants: Yes, tizanidine  - TCAs:   - SNRIs: Yes  - Topicals:   - Anticonvulsants:  - Opioids: Yes    Current Pain Medications:  1. Aleve   2. Tramadol  3. Tylenol  4. Effexor    Full Medication List:    Current Outpatient Medications:     albuterol (PROVENTIL/VENTOLIN HFA) 90 mcg/actuation inhaler, Inhale 2 puffs into the lungs every 6 (six) hours as needed for Wheezing., Disp: 1 each, Rfl: 11    albuterol (VENTOLIN HFA) 90 mcg/actuation inhaler, Inhale 2 puffs into the lungs every 6 (six) hours as needed for Wheezing. Rescue, Disp: 1 each, Rfl: 11    amLODIPine (NORVASC) 5 MG tablet, Take 1 tablet (5 mg total) by mouth once daily., Disp: 30 tablet, Rfl: 0    aspirin 325 MG tablet, Take 325 mg by mouth once daily., Disp: , Rfl:     atorvastatin (LIPITOR) 40 MG tablet, TAKE 1 TABLET(40 MG) BY MOUTH EVERY DAY, Disp: 90 tablet, Rfl: 0    baclofen (LIORESAL) 20 MG tablet, Take 1 tablet (20 mg total) by mouth 3 (three) times daily., Disp: 90 tablet, Rfl: 11    cephALEXin (KEFLEX) 500 MG capsule, Take 1 capsule (500 mg total) by mouth 4 (four) times daily. for 7 days, Disp: 28 capsule, Rfl: 0    diclofenac sodium (VOLTAREN) 1 % Gel, Apply 2 g topically once daily., Disp: 1 Tube, Rfl: 3    gabapentin (NEURONTIN) 300 MG capsule, Take 1 capsule (300 mg total) by mouth every evening., Disp: 60 capsule, Rfl: 5    hydroCHLOROthiazide (HYDRODIURIL) 12.5 MG Tab, Take 1 tablet (12.5 mg total) by mouth once daily., Disp: 30 tablet, Rfl: 0    HYDROcodone-acetaminophen (NORCO) 5-325 mg per tablet, Take 1 tablet by mouth every 6 (six) hours as needed  for Pain., Disp: 16 tablet, Rfl: 0    risperiDONE (RISPERDAL) 2 MG tablet, Take 1 tablet (2 mg total) by mouth every evening., Disp: 30 tablet, Rfl: 11    risperiDONE (RISPERDAL) 4 MG tablet, Take 1 tablet (4 mg total) by mouth once daily., Disp: 30 tablet, Rfl: 11    secukinumab (COSENTYX PEN) 150 mg/mL PnIj, Inject 150 mg into the skin every 4 weeks, Disp: 1 mL, Rfl: 0    secukinumab (COSENTYX PEN) 150 mg/mL PnIj, Inject 150 mg into the skin once a week for 5 weeks, Disp: 5 mL, Rfl: 0    sulfaSALAzine (AZULFIDINE) 500 MG TbEC, TAKE 1 TABLET BY MOUTH TWICE DAILY FOR 1 WEEK, THEN 2 TABLETS TWICE DAILY FOR 1 WEEK, THEN 3 TABLETS BY MOUTH TWICE DAILY, Disp: 225 tablet, Rfl: 1    tobramycin-dexamethasone 0.3-0.1% (TOBRADEX) 0.3-0.1 % Oint, Place a thin ribbon of ointment to suture line 3 times daily., Disp: 3.5 g, Rfl: 0    traMADol (ULTRAM) 50 mg tablet, Take 1 tablet (50 mg total) by mouth every 12 (twelve) hours as needed for Pain., Disp: 60 tablet, Rfl: 0    venlafaxine (EFFEXOR-XR) 150 MG Cp24, Take 1 capsule (150 mg total) by mouth once daily., Disp: 30 capsule, Rfl: 11     Review of Systems:  Review of Systems   Constitutional: Negative for chills and fever.   HENT: Negative for nosebleeds.    Eyes: Negative for pain.   Respiratory: Negative for hemoptysis.    Cardiovascular: Negative for chest pain.   Gastrointestinal: Negative for nausea and vomiting.   Genitourinary: Negative for dysuria.   Musculoskeletal: Positive for back pain and joint pain. Negative for falls.   Skin: Negative for rash.   Neurological: Negative for tingling and focal weakness.   Endo/Heme/Allergies: Does not bruise/bleed easily.   Psychiatric/Behavioral: Negative for depression. The patient is not nervous/anxious.        Allergies:  Chloramphenicol sod succinate; Etanercept; Nickel sutures [surgical stainless steel]; and Adhesive     Medical History:  Past Medical History:   Diagnosis Date    Allergy     Amblyopia     Anemia      Arthritis 1992    Cataract     Depression     Dry eyes     Dry mouth     Fibromyalgia 2014    Fibromyalgia     GERD (gastroesophageal reflux disease)     History of psychiatric hospitalization     Hyperlipidemia 1992    Hypertension     Kidney stone     Migraine headache     Osteoporosis     Psoriatic arthritis 1992    Right knee pain     post knee replacement surgery (possible rejectiion of metal)    RLS (restless legs syndrome)     Schizophrenia 1992    stable on meds    Urinary tract infection         Surgical History:  Past Surgical History:   Procedure Laterality Date    ARTHROPLASTY, KNEE, TOTAL Right 2013    Performed [facility] at [facility] OR 2ND FLR    Bilateral Sacroiliac Joint Injection - Per Dr Pineda, not necessary to hold ASA. Bilateral 2019    Performed by Michelle P[facility] Jr., [facility] at [facility] PAIN MGT    BLOCK, SACROILIAC JOINT-BILATERAL Bilateral 2018    Performed by [facility] Jr[facility] at [facility] PAIN MGT    BLOCK, SACROILIAC JOINT-Right- ORAL SEDATION Right 2018    Performed by Michelle [facility] Jr., MD at [facility] PAIN MGT     SECTION      COLONOSCOPY N/A 2016    Performed by ANDRIA Connelly MD at [facility] ENDO (4TH FLR)    COLONOSCOPY N/A 2013    Performed by Didier Poole MD at [facility] ENDO (4TH FLR)    cyst removed from right sinus  1982    GRAFT-FEMORAL AUTOLOGOUS BONE GRAFTING Right 2014    [facility] at [facility]    HYSTERECTOMY      VAGINAL HYSTERECTOMY WITHOUT BSO - ENDOMETRIOSIS    JOINT REPLACEMENT Right     knee    KNEE SURGERY      REVISION-ARTHROPLASTY-KNEE-TOTAL Right 2014    Performed by Stone [facility] at [facility] OR    Surgery on right knee  1982    tumor removed from back left side upper shoulder  2006        Social History:  Social History     Socioeconomic History    Marital status:      Spouse name: Not on  file    Number of children: 1    Years of education: Not on file    Highest education level: Not on file   Social Needs    Financial resource strain: Not on file    Food insecurity - worry: Not on file    Food insecurity - inability: Not on file    Transportation needs - medical: Not on file    Transportation needs - non-medical: Not on file   Occupational History    Occupation: retired   [address].     Employer: 2010   Tobacco Use    Smoking status: Former Smoker     Packs/day: 0.50     Years: 10.00     Pack years: 5.00     Types: Cigarettes     Last attempt to quit: 2012     Years since quittin.8    Smokeless tobacco: Former User    Tobacco comment: stopped smoking .     Substance and Sexual Activity    Alcohol use: No    Drug use: No    Sexual activity: No     Partners: Male     Birth control/protection: Post-menopausal   Other Topics Concern    Patient feels they ought to cut down on drinking/drug use Not Asked    Patient annoyed by others criticizing their drinking/drug use Not Asked    Patient has felt bad or guilty about drinking/drug use Not Asked    Patient has had a drink/used drugs as an eye opener in the AM Not Asked    Are you pregnant or think you may be? Not Asked    Breast-feeding Not Asked   Social History Narrative    Exercise:  Childcare.        Ett:  3 days a week       Physical Exam:  Vitals:    19 1003   BP: 131/78   Pulse: 91   Weight: 47.6 kg (105 lb)   PainSc:   6     General    Nursing note and vitals reviewed.  Constitutional: She is oriented to person, place, and time. She appears well-developed and well-nourished. No distress.   HENT:   Head: Normocephalic and atraumatic.   Nose: Nose normal.   Eyes: Conjunctivae and EOM are normal. Pupils are equal, round, and reactive to light. Right eye exhibits no discharge. Left eye exhibits no discharge. No scleral icterus.   Neck: No JVD present.   Cardiovascular: Intact distal  pulses.    Pulmonary/Chest: Effort normal. No respiratory distress.   Abdominal: She exhibits no distension.   Neurological: She is alert and oriented to person, place, and time. Coordination normal.   Psychiatric: She has a normal mood and affect. Her behavior is normal. Judgment and thought content normal.     General Musculoskeletal Exam   Gait: antalgic         Right Hip Exam   Right hip exam is normal.     Tenderness   The patient tender to palpation of the SI joint.    Tests   Pain w/ forced internal rotation (HELIO): present  Back (L-Spine & T-Spine) / Neck (C-Spine) Exam     Tenderness Right paramedian tenderness of the Sacrum.         Imagin18 - X-Ray Lumbar Spine Ap And Lateral   Narrative    EXAMINATION:  XR LUMBAR SPINE AP AND LATERAL    CLINICAL HISTORY:  Low back pain, >6wks conservative tx, persistent-progressive sx, surgical candidate;pain r low back;Dorsalgia, unspecified    TECHNIQUE:  AP, lateral and spot images were performed of the lumbar spine.    COMPARISON:  None    FINDINGS:  DJD and lumbar scoliosis.  The L1/L2 and the L2/L3 disc spaces are significantly narrowed.  No fracture, spondylolisthesis or bone destruction identified        Labs:  BMP  Lab Results   Component Value Date     2019    K 3.8 2019     2019    CO2 26 2019    BUN 7 (L) 2019    CREATININE 0.8 2019    CALCIUM 9.8 2019    ANIONGAP 10 2019    ESTGFRAFRICA >60.0 2019    EGFRNONAA >60.0 2019     Lab Results   Component Value Date    ALT 15 2019    AST 20 2019    GGT 91 (H) 2014    ALKPHOS 74 2019    BILITOT 0.2 2019       Assessment:  Problem List Items Addressed This Visit     Low back pain    Sacroiliac joint dysfunction - Primary    Chronic pain          This is a 63-year-old female with chronic low back pain the past 4 months.  The pain is nonradiating and exacerbated by prolonged sitting, standing, rolling over in  "bed.  Her physical exam demonstrates a positive HELIO sign on the right.  The history and exam are consistent with right sacroiliac joint dysfunction.  The patient states pain limits her ability to accomplish some activities of daily living and I have recommended a right sacroiliac joint injection for diagnostic and therapeutic purposes.  This injection will also facilitate her participation in physical therapy which is scheduled to start this coming Friday, August 17th.    1/28/19 - Previous SI injection lasted 5 months and patient would like to repeat the injection, which is appropriate for helping to restore function.    3/11/19 - sacroiliac joint injection provided excellent diagnostic value in that confirmed the location of her pain with 90% relief reported for 3 days.  She did not receive prolonged relief from the injection and additional SI injections are not indicated.  I spent time discussing with her additional treatment options including the Coolief procedure, which will require diagnostic blocks prior to it.  Should that fail to provide her at least 6 months of relief, she may be a candidate for SI fusion.    Treatment Plan:   DME: Order placed for SI joint brace  PT/OT/HEP: I requested that she return to PT after the injection even though she associates it with increasing her pain.  Procedures:  None at this time; however, the patient will call back if she wishes to proceed with blockade of DR5 + Lateral Branches of S1, S2, and S3  Medications:    - Patient requested a "stronger" opioid for her pain.  I declined advising her to maximize her use of heat/ice, tylenol, and rest.  Imaging: No additional imaging recommended at this time.  Labs: Reviewed.  Medications are appropriately dosed for current hepatorenal function.    Follow Up: RTC p injection    Michelle Pineda Jr, MD  Interventional Pain Medicine / Anesthesiology    Disclaimer: This note was partly generated using dictation software which " may occasionally result in transcription errors.

## 2019-03-11 ENCOUNTER — OFFICE VISIT (OUTPATIENT)
Dept: PAIN MEDICINE | Facility: CLINIC | Age: 64
End: 2019-03-11
Payer: MEDICARE

## 2019-03-11 VITALS
DIASTOLIC BLOOD PRESSURE: 78 MMHG | WEIGHT: 105 LBS | BODY MASS INDEX: 19.2 KG/M2 | SYSTOLIC BLOOD PRESSURE: 131 MMHG | HEART RATE: 91 BPM

## 2019-03-11 DIAGNOSIS — G89.4 CHRONIC PAIN SYNDROME: ICD-10-CM

## 2019-03-11 DIAGNOSIS — M53.3 SACROILIAC JOINT DYSFUNCTION: Primary | ICD-10-CM

## 2019-03-11 DIAGNOSIS — M54.50 ACUTE BILATERAL LOW BACK PAIN WITHOUT SCIATICA: ICD-10-CM

## 2019-03-11 PROCEDURE — 99999 PR PBB SHADOW E&M-EST. PATIENT-LVL III: ICD-10-PCS | Mod: PBBFAC,,, | Performed by: PAIN MEDICINE

## 2019-03-11 PROCEDURE — 99214 OFFICE O/P EST MOD 30 MIN: CPT | Mod: S$GLB,,, | Performed by: PAIN MEDICINE

## 2019-03-11 PROCEDURE — 1101F PT FALLS ASSESS-DOCD LE1/YR: CPT | Mod: CPTII,S$GLB,, | Performed by: PAIN MEDICINE

## 2019-03-11 PROCEDURE — 3075F SYST BP GE 130 - 139MM HG: CPT | Mod: CPTII,S$GLB,, | Performed by: PAIN MEDICINE

## 2019-03-11 PROCEDURE — 3008F PR BODY MASS INDEX (BMI) DOCUMENTED: ICD-10-PCS | Mod: CPTII,S$GLB,, | Performed by: PAIN MEDICINE

## 2019-03-11 PROCEDURE — 3008F BODY MASS INDEX DOCD: CPT | Mod: CPTII,S$GLB,, | Performed by: PAIN MEDICINE

## 2019-03-11 PROCEDURE — 99999 PR PBB SHADOW E&M-EST. PATIENT-LVL III: CPT | Mod: PBBFAC,,, | Performed by: PAIN MEDICINE

## 2019-03-11 PROCEDURE — 3078F DIAST BP <80 MM HG: CPT | Mod: CPTII,S$GLB,, | Performed by: PAIN MEDICINE

## 2019-03-11 PROCEDURE — 3078F PR MOST RECENT DIASTOLIC BLOOD PRESSURE < 80 MM HG: ICD-10-PCS | Mod: CPTII,S$GLB,, | Performed by: PAIN MEDICINE

## 2019-03-11 PROCEDURE — 1101F PR PT FALLS ASSESS DOC 0-1 FALLS W/OUT INJ PAST YR: ICD-10-PCS | Mod: CPTII,S$GLB,, | Performed by: PAIN MEDICINE

## 2019-03-11 PROCEDURE — 99214 PR OFFICE/OUTPT VISIT, EST, LEVL IV, 30-39 MIN: ICD-10-PCS | Mod: S$GLB,,, | Performed by: PAIN MEDICINE

## 2019-03-11 PROCEDURE — 3075F PR MOST RECENT SYSTOLIC BLOOD PRESS GE 130-139MM HG: ICD-10-PCS | Mod: CPTII,S$GLB,, | Performed by: PAIN MEDICINE

## 2019-03-14 ENCOUNTER — OFFICE VISIT (OUTPATIENT)
Dept: OPHTHALMOLOGY | Facility: CLINIC | Age: 64
End: 2019-03-14
Payer: MEDICARE

## 2019-03-14 ENCOUNTER — TELEPHONE (OUTPATIENT)
Dept: PHARMACY | Facility: CLINIC | Age: 64
End: 2019-03-14

## 2019-03-14 DIAGNOSIS — H57.811 BROW PTOSIS, RIGHT: ICD-10-CM

## 2019-03-14 DIAGNOSIS — Z98.890 POSTOPERATIVE EYE STATE: Primary | ICD-10-CM

## 2019-03-14 DIAGNOSIS — H50.811 DUANE'S SYNDROME OF RIGHT EYE: ICD-10-CM

## 2019-03-14 DIAGNOSIS — H33.311 RETINAL TEAR OF RIGHT EYE: ICD-10-CM

## 2019-03-14 PROCEDURE — 99024 POSTOP FOLLOW-UP VISIT: CPT | Mod: S$GLB,,, | Performed by: OPHTHALMOLOGY

## 2019-03-14 PROCEDURE — 99999 PR PBB SHADOW E&M-EST. PATIENT-LVL II: CPT | Mod: PBBFAC,,, | Performed by: OPHTHALMOLOGY

## 2019-03-14 PROCEDURE — 99024 PR POST-OP FOLLOW-UP VISIT: ICD-10-PCS | Mod: S$GLB,,, | Performed by: OPHTHALMOLOGY

## 2019-03-14 PROCEDURE — 99999 PR PBB SHADOW E&M-EST. PATIENT-LVL II: ICD-10-PCS | Mod: PBBFAC,,, | Performed by: OPHTHALMOLOGY

## 2019-03-14 RX ORDER — LISINOPRIL 10 MG/1
10 TABLET ORAL DAILY PRN
COMMUNITY
End: 2020-08-17 | Stop reason: DRUGHIGH

## 2019-03-14 NOTE — PROGRESS NOTES
HPI     Patient here for 1 wk postop. Right brow ptosis repair 3/10/19    Eye meds: tobradex ointment    S/p cyst removed from right sinus (7/9/1982)    Hx of brow ptosis right, retinal tear of right eye, retinal hemorrhage   left, duane's syndrome of right eye, posterior vitreous detachment ou,   migraine without aura and without status migrainosis, hypertension,   tobacco use disorder, schizophrenia.    Last edited by Marisol Pickens on 3/14/2019  3:06 PM. (History)            Assessment /Plan     For exam results, see Encounter Report.    Postoperative eye state    Brow ptosis, right    Retinal tear of right eye    Duane's syndrome of right eye      Patient doing well. Post-operative instructions reviewed. Sutures removed. All questions answered.  Return in 3 weeks prn sooner any worsening of vision/symptoms or any concerns.    I have reviewed and concur with the resident's history, physical, assessment, and plan.  I have personally interviewed and examined the patient.

## 2019-03-19 ENCOUNTER — TELEPHONE (OUTPATIENT)
Dept: INTERNAL MEDICINE | Facility: CLINIC | Age: 64
End: 2019-03-19

## 2019-03-19 DIAGNOSIS — L40.50 PSORIATIC ARTHRITIS: ICD-10-CM

## 2019-03-19 DIAGNOSIS — I10 HYPERTENSION, UNSPECIFIED TYPE: Primary | ICD-10-CM

## 2019-03-19 RX ORDER — TRAMADOL HYDROCHLORIDE 50 MG/1
TABLET ORAL
Qty: 60 TABLET | Refills: 0 | Status: SHIPPED | OUTPATIENT
Start: 2019-03-19 | End: 2019-04-23 | Stop reason: SDUPTHER

## 2019-03-19 RX ORDER — SULFASALAZINE 500 MG/1
1500 TABLET, DELAYED RELEASE ORAL 2 TIMES DAILY
Qty: 180 TABLET | Refills: 0 | Status: SHIPPED | OUTPATIENT
Start: 2019-03-19 | End: 2019-03-19 | Stop reason: SDUPTHER

## 2019-03-19 RX ORDER — SULFASALAZINE 500 MG/1
TABLET, DELAYED RELEASE ORAL
Qty: 540 TABLET | Refills: 0 | Status: SHIPPED | OUTPATIENT
Start: 2019-03-19 | End: 2019-07-11 | Stop reason: SDUPTHER

## 2019-03-19 RX ORDER — HYDROCHLOROTHIAZIDE 25 MG/1
25 TABLET ORAL DAILY
Qty: 30 TABLET | Refills: 5 | Status: SHIPPED | OUTPATIENT
Start: 2019-03-19 | End: 2019-06-18 | Stop reason: SDUPTHER

## 2019-03-19 NOTE — TELEPHONE ENCOUNTER
Increase HCTZ to 2 a day. Take only as needed.   new rx sent for 25 mg dose.    schedule bmp 2 weeks to make sure potassium ok

## 2019-03-19 NOTE — TELEPHONE ENCOUNTER
----- Message from Lilly Rowland sent at 3/19/2019  4:23 PM CDT -----  Contact: [phone]  Patient stated her ankles are still swollen she has been taking the fluid pills hydroCHLOROthiazide (HYDRODIURIL) 12.5 MG Tab for two days.    Please advise, thanks

## 2019-03-28 ENCOUNTER — TELEPHONE (OUTPATIENT)
Dept: DERMATOLOGY | Facility: CLINIC | Age: 64
End: 2019-03-28

## 2019-03-28 ENCOUNTER — TELEPHONE (OUTPATIENT)
Dept: OPHTHALMOLOGY | Facility: CLINIC | Age: 64
End: 2019-03-28

## 2019-03-29 ENCOUNTER — TELEPHONE (OUTPATIENT)
Dept: PAIN MEDICINE | Facility: CLINIC | Age: 64
End: 2019-03-29

## 2019-03-29 NOTE — TELEPHONE ENCOUNTER
Spoke with pt in regards to scheduling a procedure per Dr. Pineda. I informed pt the  will give her a call back to schedule. Pt verbalized understanding.

## 2019-03-29 NOTE — TELEPHONE ENCOUNTER
----- Message from Elizabeth Morin sent at 3/29/2019  8:16 AM CDT -----  Contact: Self [phone]  Patient would like to speak with you about a personal matter. Please advise

## 2019-04-01 ENCOUNTER — LAB VISIT (OUTPATIENT)
Dept: LAB | Facility: HOSPITAL | Age: 64
End: 2019-04-01
Attending: INTERNAL MEDICINE
Payer: MEDICARE

## 2019-04-01 ENCOUNTER — TELEPHONE (OUTPATIENT)
Dept: PAIN MEDICINE | Facility: CLINIC | Age: 64
End: 2019-04-01

## 2019-04-01 DIAGNOSIS — I10 HYPERTENSION, UNSPECIFIED TYPE: ICD-10-CM

## 2019-04-01 DIAGNOSIS — M53.3 SACROILIAC JOINT DYSFUNCTION: Primary | ICD-10-CM

## 2019-04-01 LAB
ANION GAP SERPL CALC-SCNC: 7 MMOL/L (ref 8–16)
BUN SERPL-MCNC: 9 MG/DL (ref 8–23)
CALCIUM SERPL-MCNC: 9.7 MG/DL (ref 8.7–10.5)
CHLORIDE SERPL-SCNC: 100 MMOL/L (ref 95–110)
CO2 SERPL-SCNC: 28 MMOL/L (ref 23–29)
CREAT SERPL-MCNC: 1 MG/DL (ref 0.5–1.4)
EST. GFR  (AFRICAN AMERICAN): >60 ML/MIN/1.73 M^2
EST. GFR  (NON AFRICAN AMERICAN): 59.3 ML/MIN/1.73 M^2
GLUCOSE SERPL-MCNC: 110 MG/DL (ref 70–110)
POTASSIUM SERPL-SCNC: 4.3 MMOL/L (ref 3.5–5.1)
SODIUM SERPL-SCNC: 135 MMOL/L (ref 136–145)

## 2019-04-01 PROCEDURE — 80048 BASIC METABOLIC PNL TOTAL CA: CPT

## 2019-04-01 PROCEDURE — 36415 COLL VENOUS BLD VENIPUNCTURE: CPT | Mod: PO

## 2019-04-01 NOTE — TELEPHONE ENCOUNTER
----- Message from Chika Mcallister MA sent at 3/29/2019  8:21 AM CDT -----  Per Dr. Pineda pt would like to scheduled a blockade of DR5 + Lateral Branches of S1, S2, and S3. She is taking  mg prescribed by PCP.

## 2019-04-01 NOTE — TELEPHONE ENCOUNTER
Pt scheduled for procedure per Dr Pineda on 4/11/19.  Instructions reviewed with pt per phone and mailed to pt's home.  Pt instructed to hold ASA x 3 days prior to procedure.  Pt verbalized understanding of all instructions.

## 2019-04-05 ENCOUNTER — TELEPHONE (OUTPATIENT)
Dept: PAIN MEDICINE | Facility: CLINIC | Age: 64
End: 2019-04-05

## 2019-04-05 NOTE — TELEPHONE ENCOUNTER
----- Message from Charleen Conn sent at 4/5/2019 12:28 PM CDT -----  Contact: [phone]/SELF  Patient requesting to speak with you regarding scheduling a procedure.

## 2019-04-05 NOTE — TELEPHONE ENCOUNTER
Returned call pt stated that she wanted a earlier procedure time informed pt that we try to save the morning time slots for pt that are diabetic but I will see what I can do. I spoke with IR and pt was moved to 12.00 pm pt verbalized understanding.    ----- Message from Charleen Conn sent at 4/5/2019 12:28 PM CDT -----  Contact: [phone]/SELF  Patient requesting to speak with you regarding scheduling a procedure.

## 2019-04-05 NOTE — TELEPHONE ENCOUNTER
Called pt.  Pt states she needs procedure to be done first thing in am due to her transportation.  Advised pt that I can request a time, but that procedure times are decided by the hospital.  Pt decided to keep her procedure as originally scheduled on 4/11/19, and work on different transportation.

## 2019-04-05 NOTE — TELEPHONE ENCOUNTER
----- Message from Elisa Grimaldo sent at 4/5/2019  3:46 PM CDT -----  Contact: Self/ [phone]  Patient called in to reschedule her procedure.    Please call.

## 2019-04-09 NOTE — TELEPHONE ENCOUNTER
FOR DOCUMENTATION ONLY:  Financial Assistance for Cosentyx approved from 4/8/19 to 12/31/19  Source: [facility] Patient Assistance Foundation  Phone: [phone]  Fax: [phone]

## 2019-04-09 NOTE — TELEPHONE ENCOUNTER
Patient notified of Cosentyx assistance approval. She has already called to schedule delivery but is unsure of the date of arrival. Explained that she will need to call to order loading doses weekly as they will only send 1 week at a time. Made patient aware to call [facility] or MD if renewal assistance is needed. Patient verbalized understanding.

## 2019-04-10 ENCOUNTER — TELEPHONE (OUTPATIENT)
Dept: PAIN MEDICINE | Facility: CLINIC | Age: 64
End: 2019-04-10

## 2019-04-10 NOTE — TELEPHONE ENCOUNTER
----- Message from Armando Fields sent at 4/10/2019  2:42 PM CDT -----  Contact: self/[phone]  Patient called to speak with your office about how long the procedure is going to take tomorrow and if he is doing both sides.    Please call today to discuss as she needs to coordinate her ride with her friend.

## 2019-04-11 ENCOUNTER — HOSPITAL ENCOUNTER (OUTPATIENT)
Facility: HOSPITAL | Age: 64
Discharge: HOME | End: 2019-04-11
Attending: PAIN MEDICINE | Admitting: PAIN MEDICINE
Payer: MEDICARE

## 2019-04-11 VITALS
HEIGHT: 62 IN | TEMPERATURE: 98 F | RESPIRATION RATE: 14 BRPM | DIASTOLIC BLOOD PRESSURE: 77 MMHG | WEIGHT: 105 LBS | HEART RATE: 88 BPM | SYSTOLIC BLOOD PRESSURE: 173 MMHG | BODY MASS INDEX: 19.32 KG/M2 | OXYGEN SATURATION: 100 %

## 2019-04-11 DIAGNOSIS — M53.3 SACROILIAC JOINT DYSFUNCTION: Primary | ICD-10-CM

## 2019-04-11 DIAGNOSIS — G89.29 CHRONIC PAIN: ICD-10-CM

## 2019-04-11 PROCEDURE — 64493 INJ PARAVERT F JNT L/S 1 LEV: CPT | Mod: 50 | Performed by: PAIN MEDICINE

## 2019-04-11 PROCEDURE — 64493 INJ PARAVERT F JNT L/S 1 LEV: CPT | Mod: 50,,, | Performed by: PAIN MEDICINE

## 2019-04-11 PROCEDURE — 99152 MOD SED SAME PHYS/QHP 5/>YRS: CPT | Performed by: PAIN MEDICINE

## 2019-04-11 PROCEDURE — 64495 PR INJ DX/THER AGNT PARAVERT FACET JOINT,IMG GUIDE,LUMBAR/SAC, ADD LEVEL: ICD-10-PCS | Mod: 50,,, | Performed by: PAIN MEDICINE

## 2019-04-11 PROCEDURE — 64493 PR INJ DX/THER AGNT PARAVERT FACET JOINT,IMG GUIDE,LUMBAR/SAC,1ST LVL: ICD-10-PCS | Mod: 50,,, | Performed by: PAIN MEDICINE

## 2019-04-11 PROCEDURE — 99152 MOD SED SAME PHYS/QHP 5/>YRS: CPT | Mod: ,,, | Performed by: PAIN MEDICINE

## 2019-04-11 PROCEDURE — 64494 INJ PARAVERT F JNT L/S 2 LEV: CPT | Mod: 50 | Performed by: PAIN MEDICINE

## 2019-04-11 PROCEDURE — 64494 PR INJ DX/THER AGNT PARAVERT FACET JOINT,IMG GUIDE,LUMBAR/SAC, 2ND LEVEL: ICD-10-PCS | Mod: 50,,, | Performed by: PAIN MEDICINE

## 2019-04-11 PROCEDURE — 25500020 PHARM REV CODE 255: Performed by: PAIN MEDICINE

## 2019-04-11 PROCEDURE — 64495 INJ PARAVERT F JNT L/S 3 LEV: CPT | Mod: 50,,, | Performed by: PAIN MEDICINE

## 2019-04-11 PROCEDURE — 64495 INJ PARAVERT F JNT L/S 3 LEV: CPT | Mod: 50 | Performed by: PAIN MEDICINE

## 2019-04-11 PROCEDURE — 64494 INJ PARAVERT F JNT L/S 2 LEV: CPT | Mod: 50,,, | Performed by: PAIN MEDICINE

## 2019-04-11 PROCEDURE — 25000003 PHARM REV CODE 250: Performed by: PAIN MEDICINE

## 2019-04-11 PROCEDURE — 99152 PR MOD CONSCIOUS SEDATION, SAME PHYS, 5+ YRS, FIRST 15 MIN: ICD-10-PCS | Mod: ,,, | Performed by: PAIN MEDICINE

## 2019-04-11 PROCEDURE — 63600175 PHARM REV CODE 636 W HCPCS: Performed by: PAIN MEDICINE

## 2019-04-11 RX ORDER — BUPIVACAINE HYDROCHLORIDE 2.5 MG/ML
INJECTION, SOLUTION EPIDURAL; INFILTRATION; INTRACAUDAL
Status: DISCONTINUED | OUTPATIENT
Start: 2019-04-11 | End: 2019-04-11 | Stop reason: HOSPADM

## 2019-04-11 RX ORDER — FENTANYL CITRATE 50 UG/ML
INJECTION, SOLUTION INTRAMUSCULAR; INTRAVENOUS
Status: DISCONTINUED | OUTPATIENT
Start: 2019-04-11 | End: 2019-04-11 | Stop reason: HOSPADM

## 2019-04-11 RX ORDER — LIDOCAINE HYDROCHLORIDE 10 MG/ML
1 INJECTION, SOLUTION EPIDURAL; INFILTRATION; INTRACAUDAL; PERINEURAL ONCE
Status: DISCONTINUED | OUTPATIENT
Start: 2019-04-11 | End: 2019-04-11 | Stop reason: HOSPADM

## 2019-04-11 RX ORDER — SODIUM CHLORIDE 9 MG/ML
500 INJECTION, SOLUTION INTRAVENOUS CONTINUOUS
Status: DISCONTINUED | OUTPATIENT
Start: 2019-04-11 | End: 2019-04-11 | Stop reason: HOSPADM

## 2019-04-11 RX ORDER — MIDAZOLAM HYDROCHLORIDE 1 MG/ML
INJECTION INTRAMUSCULAR; INTRAVENOUS
Status: DISCONTINUED | OUTPATIENT
Start: 2019-04-11 | End: 2019-04-11 | Stop reason: HOSPADM

## 2019-04-11 RX ORDER — SODIUM BICARBONATE 1 MEQ/ML
SYRINGE (ML) INTRAVENOUS
Status: DISCONTINUED | OUTPATIENT
Start: 2019-04-11 | End: 2019-04-11 | Stop reason: HOSPADM

## 2019-04-11 RX ORDER — LIDOCAINE HYDROCHLORIDE 10 MG/ML
INJECTION, SOLUTION EPIDURAL; INFILTRATION; INTRACAUDAL; PERINEURAL
Status: DISCONTINUED | OUTPATIENT
Start: 2019-04-11 | End: 2019-04-11 | Stop reason: HOSPADM

## 2019-04-11 RX ADMIN — SODIUM CHLORIDE 500 ML: 0.9 INJECTION, SOLUTION INTRAVENOUS at 12:04

## 2019-04-11 NOTE — DISCHARGE SUMMARY
[facility]  Discharge Note  Short Stay     Admit Date: 4/11/2019    Discharge Date: 4/11/2019     Attending Physician: Michelle Pineda Jr, MD    Diagnoses:  Active Hospital Problems    Diagnosis  POA    *Sacroiliac joint dysfunction [M53.3]  Yes    Chronic pain [G89.29]  Yes      Resolved Hospital Problems   No resolved problems to display.     Discharged Condition: Good     Hospital Course: Patient was admitted for an outpatient interventional pain management procedure and tolerated the procedure well with no complications.     Final Diagnoses: Same as principal problem.     Disposition: Home or Self Care     Follow up/Patient Instructions:    Follow-up Information     MILLIE Kumar. Go in 1 week.    Specialty:  Pain Medicine  Why:  Post-procedural Follow Up As Scheduled, To Report Your Pain Scores from the Pain Diary  Contact information:  [address]  [address]  [place] [address] [phone]                   Reconciled Medications:     Medication List      CONTINUE taking these medications    * albuterol 90 mcg/actuation inhaler  Commonly known as:  PROVENTIL/VENTOLIN HFA  Inhale 2 puffs into the lungs every 6 (six) hours as needed for Wheezing.     * albuterol 90 mcg/actuation inhaler  Commonly known as:  VENTOLIN HFA  Inhale 2 puffs into the lungs every 6 (six) hours as needed for Wheezing. Rescue     amLODIPine 5 MG tablet  Commonly known as:  NORVASC  Take 1 tablet (5 mg total) by mouth once daily.     aspirin 325 MG tablet  Take 325 mg by mouth once daily.     atorvastatin 40 MG tablet  Commonly known as:  LIPITOR  TAKE 1 TABLET(40 MG) BY MOUTH EVERY DAY     baclofen 20 MG tablet  Commonly known as:  LIORESAL  Take 1 tablet (20 mg total) by mouth 3 (three) times daily.     diclofenac sodium 1 % Gel  Commonly known as:  VOLTAREN  Apply 2 g topically once daily.     gabapentin 300 MG capsule  Commonly known as:  NEURONTIN  Take 1 capsule (300 mg total) by mouth every evening.      hydroCHLOROthiazide 25 MG tablet  Commonly known as:  HYDRODIURIL  Take 1 tablet (25 mg total) by mouth once daily.     HYDROcodone-acetaminophen 5-325 mg per tablet  Commonly known as:  NORCO  Take 1 tablet by mouth every 6 (six) hours as needed for Pain.     lisinopril 10 MG tablet  Take 10 mg by mouth once daily.     risperiDONE 2 MG tablet  Commonly known as:  RISPERDAL  Take 1 tablet (2 mg total) by mouth every evening.     secukinumab 150 mg/mL Pnij  Commonly known as:  COSENTYX PEN  Inject 150 mg into the skin every 4 weeks     * sulfaSALAzine 500 MG Tbec  Commonly known as:  AZULFIDINE  TAKE 1 TABLET BY MOUTH TWICE DAILY FOR 1 WEEK, THEN 2 TABLETS TWICE DAILY FOR 1 WEEK, THEN 3 TABLETS BY MOUTH TWICE DAILY     * sulfaSALAzine 500 MG Tbec  Commonly known as:  AZULFIDINE  TAKE 3 TABLETS(1500 MG) BY MOUTH TWICE DAILY     tobramycin-dexamethasone 0.3-0.1% 0.3-0.1 % Oint  Commonly known as:  TOBRADEX  Place a thin ribbon of ointment to suture line 3 times daily.     traMADol 50 mg tablet  Commonly known as:  ULTRAM  TAKE 1 TABLET BY MOUTH EVERY 12 HOURS AS NEEDED FOR PAIN     venlafaxine 150 MG Cp24  Commonly known as:  EFFEXOR-XR  Take 1 capsule (150 mg total) by mouth once daily.         * This list has 4 medication(s) that are the same as other medications prescribed for you. Read the directions carefully, and ask your doctor or other care provider to review them with you.               Discharge Procedure Orders (must include Diet, Follow-up, Activity)   Call MD for:  temperature >100.4     Call MD for:  severe uncontrolled pain     Call MD for:  redness, tenderness, or signs of infection (pain, swelling, redness, odor or green/yellow discharge around incision site)     Call MD for:  difficulty breathing or increased cough     Call MD for:  severe persistent headache     Call MD for:  worsening rash     Remove dressing in 24 hours       Michelle Pineda Jr, MD  Interventional Pain Medicine /  Anesthesiology

## 2019-04-11 NOTE — DISCHARGE INSTRUCTIONS
Home Care Instructions Pain Management:    1.  DIET:    You may resume your normal diet today.    2.  BATHING:    You may shower with luke warm water.    3.  DRESSING:    You may remove your bandage today.    4.  ACTIVITY LEVEL:      You may resume your normal activities 24 hours after your procedure.    5.  MEDICATIONS:    You may resume your normal medications today.    6.  SPECIAL INSTRUCTIONS:    No heat to the injection site for 24 hours including bath or shower, heating pad, moist heat or hot tubs.    Use an ice pack to the injection site for any pain or discomfort.  Apply ice packs for 20 minute intervals as needed.    If you have received any sedatives by mouth today, you can not drive for 12 hours.    If you have received sedation through an IV, you can not drive for 24 hours.    PLEASE CALL YOUR DOCTOR FOR THE FOLLOWIN.  Redness or swelling around the injection site.  2.  Fever of 101 degrees.  3.  Drainage (pus) from the injection site.  4.  For any continuous bleeding (some dried blood over the incision is normal.)    FOR EMERGENCIES:    If any unusual problems or difficulties occur during clinic hours, call [phone] or dial [phone].    Follow up with with your physician in 2-3 weeks.

## 2019-04-11 NOTE — OP NOTE
Procedure Note    Pre-operative diagnosis: Sacroiliac Dysfunction, Chronic Low Back Pain  Post-operative diagnosis: Sacroiliac Dysfunction, Chronic Low Back Pain  Procedure Date: 04/11/2019  Procedure:  (1) BILATERAL Dorsal Ramus Block at L5    (2) BILATERAL Sacral Lateral Branch Block at S1, S2, S3    (3) Fluoroscopy for needle guidance    (3) IV Moderate Sedation (Midazolam 2 mg, Fentanyl 50 mcg)      Procedure in detail:  Informed consent obtained after explaining the procedure and potential complications including local discomfort, infection, headache, temporary or permanent weakness and/or numbness of one or both legs, temporary or permanent paraplegia, heart attack and stroke. All questions were answered.      Patient was taken to the procedure room and placed in prone position.  Time out was performed.  Patient's lumbosacral area was prepped and draped in a sterile manner.  AP and oblique fluoroscopy were used to identify and johnny the RIGHT sacral ala as well as the mid point between the sacroiliac joint and the dorsal sacral foramina at RIGHT S1, S2, and S3.   Skin and tissues overlying the targeted points were anesthetized with Lidocaine 1% using a 25G needle.  Then, under fluoroscopic guidance, a 22 G 3.5 inch spinal needle, was advanced through the anesthetized skin and tissues to the targeted points corresponding with the locations of the L5 dorsal ramus and sacral lateral branches at S1, S2, and S3.    Then, after negative aspiration for heme, 0.2 mL of contrast was administered demonstrating appropriate spread for medial branch coverage.  Next, 0.5 mL of 0.5% bupivacaine was injected at each of the above targeted points.  The sacral needles were adjusted approximately 2 mm cephalad and 2 mm caudad with additional 0.5 mL of bupivacaine administered at each location.    The procedure was repeated on the opposite side with similar findings.      Needle placement and contrast spread were consistent with  successful medial branch nerve block.  Needle was removed with flush and bandaged placed over site of needle insertion.      Estimated Blood Loss: None    Disposition: The patient tolerated the procedure well and was transported to the recovery room in stable condition.      Michelle Pineda Jr, MD  Interventional Pain Medicine / Anesthesiology

## 2019-04-11 NOTE — PLAN OF CARE
13:10 Pt transferred to post pain/ir recovery slot 7 via stretcher w/ sr up x2. Vss. aidet completed to pt. Will continue to monitor pt. No c/o pain.

## 2019-04-11 NOTE — PLAN OF CARE
13:40  pt is stable. c/o pain 2/10 to lower back. pt tolerating po fluids well and crackers well. No n/v. Vss. Lower back gerry gauze paper tape CDI. No bleeding, no hematoma, no redness noted. Iv site dc'd with tip intact. Gauze/koban applied cdi. Pt is in w/c escorted out pain to home with family friend.

## 2019-04-11 NOTE — H&P
[facility]  History & Physical - Short Stay  Pain Management           SUBJECTIVE:     Procedure: Procedure(s) (LRB):  Lumbo-sacral Block, DR5 and Lateral Branches of S1,S2, S3 (N/A)    Chief Complaint/Reason for Admission:  Sacroiliac joint dysfunction [M53.3]    PTA Medications   Medication Sig    albuterol (PROVENTIL/VENTOLIN HFA) 90 mcg/actuation inhaler Inhale 2 puffs into the lungs every 6 (six) hours as needed for Wheezing.    albuterol (VENTOLIN HFA) 90 mcg/actuation inhaler Inhale 2 puffs into the lungs every 6 (six) hours as needed for Wheezing. Rescue    aspirin 325 MG tablet Take 325 mg by mouth once daily.    baclofen (LIORESAL) 20 MG tablet Take 1 tablet (20 mg total) by mouth 3 (three) times daily.    diclofenac sodium (VOLTAREN) 1 % Gel Apply 2 g topically once daily.    HYDROcodone-acetaminophen (NORCO) 5-325 mg per tablet Take 1 tablet by mouth every 6 (six) hours as needed for Pain.    lisinopril 10 MG tablet Take 10 mg by mouth once daily.    risperiDONE (RISPERDAL) 2 MG tablet Take 1 tablet (2 mg total) by mouth every evening.    secukinumab (COSENTYX PEN) 150 mg/mL PnIj Inject 150 mg into the skin every 4 weeks    sulfaSALAzine (AZULFIDINE) 500 MG TbEC TAKE 1 TABLET BY MOUTH TWICE DAILY FOR 1 WEEK, THEN 2 TABLETS TWICE DAILY FOR 1 WEEK, THEN 3 TABLETS BY MOUTH TWICE DAILY    sulfaSALAzine (AZULFIDINE) 500 MG TbEC TAKE 3 TABLETS(1500 MG) BY MOUTH TWICE DAILY    tobramycin-dexamethasone 0.3-0.1% (TOBRADEX) 0.3-0.1 % Oint Place a thin ribbon of ointment to suture line 3 times daily.    traMADol (ULTRAM) 50 mg tablet TAKE 1 TABLET BY MOUTH EVERY 12 HOURS AS NEEDED FOR PAIN    venlafaxine (EFFEXOR-XR) 150 MG Cp24 Take 1 capsule (150 mg total) by mouth once daily.    amLODIPine (NORVASC) 5 MG tablet Take 1 tablet (5 mg total) by mouth once daily.    atorvastatin (LIPITOR) 40 MG tablet TAKE 1 TABLET(40 MG) BY MOUTH EVERY DAY    gabapentin (NEURONTIN) 300 MG capsule  Take 1 capsule (300 mg total) by mouth every evening.    hydroCHLOROthiazide (HYDRODIURIL) 25 MG tablet Take 1 tablet (25 mg total) by mouth once daily.       Review of patient's allergies indicates:   Allergen Reactions    Chloramphenicol sod succinate Hives and Other (See Comments)    Etanercept Other (See Comments)     Other reaction(s): recurrent infections    Nickel sutures [surgical stainless steel]      Allergic contact dermatitis    Adhesive Rash     Other reaction(s): Rash       Past Medical History:   Diagnosis Date    Allergy     Amblyopia     Anemia     Arthritis 1992    Cataract     Depression     Dry eyes     Dry mouth     Fibromyalgia 2014    Fibromyalgia     GERD (gastroesophageal reflux disease)     History of psychiatric hospitalization     Hyperlipidemia 1992    Hypertension     Kidney stone     Migraine headache     Osteoporosis     Psoriatic arthritis 1992    Right knee pain     post knee replacement surgery (possible rejectiion of metal)    RLS (restless legs syndrome)     Schizophrenia 1992    stable on meds    Urinary tract infection      Past Surgical History:   Procedure Laterality Date    ARTHROPLASTY, KNEE, TOTAL Right 2013    [facility] 2ND FLR    Bilateral Sacroiliac Joint Injection - Per Dr Pineda, not necessary to hold ASA. Bilateral 2019    Performed by Michelle [facility]a Jr., [facility] at [facility] PAIN MGT    BLOCK, SACROILIAC JOINT-BILATERAL Bilateral 2018    Performed by [facility] Jr[facility] at [facility] PAIN MGT    BLOCK, SACROILIAC JOINT-Right- ORAL SEDATION Right 2018    Performed by Michelle [facility] Jr., [facility] at [facility] PAIN MGT    brow ptosis repair Right 2019    Surgeon: Dr. Karla Henson     SECTION      COLONOSCOPY N/A 2016    Performed by ANDRIA Connelly MD at [facility] ENDO (4TH FLR)    COLONOSCOPY N/A 2013    [facility]ier [facility] (4[facility])    cyst removed from right sinus  1982    GRAFT-FEMORAL AUTOLOGOUS BONE GRAFTING Right 2014    [facility] at [facility]    HYSTERECTOMY      VAGINAL HYSTERECTOMY WITHOUT BSO - ENDOMETRIOSIS    JOINT REPLACEMENT Right     knee    KNEE SURGERY      REVISION-ARTHROPLASTY-KNEE-TOTAL Right 2014    [facility]    Surgery on right knee  1982    tumor removed from back left side upper shoulder  2006     Family History   Problem Relation Age of Onset    Colon cancer Mother     Psoriasis Mother     Cancer Mother         colon    Depression Mother     Diabetes Brother     Arthritis Brother     Heart disease Brother         cad    Cancer Father         lymphoma    Stroke Sister     No Known Problems Daughter     Hypertension Neg Hx     Suicide Neg Hx     Amblyopia Neg Hx     Blindness Neg Hx     Cataracts Neg Hx     Glaucoma Neg Hx     Macular degeneration Neg Hx     Retinal detachment Neg Hx     Strabismus Neg Hx      Social History     Tobacco Use    Smoking status: Former Smoker     Packs/day: 0.50     Years: 10.00     Pack years: 5.00     Types: Cigarettes     Last attempt to quit: 2012     Years since quittin.8    Smokeless tobacco: Former User    Tobacco comment: stopped smoking .     Substance Use Topics    Alcohol use: No    Drug use: No        Review of Systems:  Review of Systems   Constitutional: Negative for chills and fever.   HENT: Negative for nosebleeds.    Eyes: Negative for blurred vision and pain.   Respiratory: Negative for hemoptysis.    Cardiovascular: Negative for chest pain and palpitations.   Gastrointestinal: Negative for heartburn, nausea and vomiting.   Genitourinary: Negative for dysuria and hematuria.   Musculoskeletal: Positive for back pain. Negative for myalgias.   Skin: Negative for rash.   Neurological: Negative for seizures and loss of consciousness.    Endo/Heme/Allergies: Does not bruise/bleed easily.   Psychiatric/Behavioral: Negative for hallucinations.       OBJECTIVE:     Vital Signs (Most Recent):  Temp: 98.3 °F (36.8 °C) (04/11/19 1218)  Pulse: 81 (04/11/19 1218)  Resp: 16 (04/11/19 1218)  BP: (!) 166/76 (04/11/19 1218)  SpO2: 99 % (04/11/19 1218)    Physical Exam:  General appearance - alert, well appearing, and in no distress  Mental status - AOx3  Eyes - pupils equal and reactive, extraocular eye movements intact  Heart - normal rate, regular rhythm, normal S1, S2, no murmurs, rubs, clicks or gallops  Chest - clear to auscultation, no wheezes, rales or rhonchi, symmetric air entry  Abdomen - soft, nontender, nondistended, no masses or organomegaly  Neurological - alert, oriented, normal speech, no focal findings or movement disorder noted  Extremities - peripheral pulses normal, no pedal edema, no clubbing or cyanosis        ASSESSMENT/PLAN:     Active Hospital Problems    Diagnosis  POA    Chronic pain [G89.29]  Yes      Resolved Hospital Problems   No resolved problems to display.        The risks and benefits of this intervention, and alternative therapies were discussed with the patient.  The discussion of risks included infection, bleeding, need for additional procedures or surgery, nerve damage, paralysis, adverse medication reaction(s), stroke, and/or death.  Questions regarding the procedure, risks, expected outcome, and possible side effects were solicited and answered to the patient's satisfaction.  Jessica wishes to proceed with the injection.  Verbal and written consent were verified.      Proceed with intervention as scheduled.      Michelle Pineda Jr, MD  Interventional Pain Medicine / Anesthesiology

## 2019-04-15 ENCOUNTER — PATIENT MESSAGE (OUTPATIENT)
Dept: PSYCHIATRY | Facility: CLINIC | Age: 64
End: 2019-04-15

## 2019-04-15 DIAGNOSIS — F20.9 SCHIZOPHRENIA, CHRONIC CONDITION WITH ACUTE EXACERBATION: Chronic | ICD-10-CM

## 2019-04-15 RX ORDER — RISPERIDONE 4 MG/1
4 TABLET ORAL 2 TIMES DAILY
Qty: 60 TABLET | Refills: 11 | Status: SHIPPED | OUTPATIENT
Start: 2019-04-15 | End: 2019-06-19 | Stop reason: SDUPTHER

## 2019-04-15 NOTE — TELEPHONE ENCOUNTER
----- Message from Maricel Muñoz sent at 4/15/2019 10:07 AM CDT -----  Contact: Amie [phone]  Prescription Request:     Name of medication: risperiDONE tablet 4 mg       Reason for request: New    Pharmacy: [facility] [address] [place] [address] [place], [place] - [address] [facility] [address]    Requesting increased dosage. Please advise.    Thank You

## 2019-04-19 ENCOUNTER — OFFICE VISIT (OUTPATIENT)
Dept: URGENT CARE | Facility: CLINIC | Age: 64
End: 2019-04-19
Payer: MEDICARE

## 2019-04-19 VITALS
OXYGEN SATURATION: 97 % | BODY MASS INDEX: 19.32 KG/M2 | TEMPERATURE: 97 F | SYSTOLIC BLOOD PRESSURE: 122 MMHG | WEIGHT: 105 LBS | DIASTOLIC BLOOD PRESSURE: 84 MMHG | HEART RATE: 84 BPM | HEIGHT: 62 IN | RESPIRATION RATE: 18 BRPM

## 2019-04-19 DIAGNOSIS — S51.802A AVULSION OF SKIN OF FOREARM, LEFT, INITIAL ENCOUNTER: Primary | ICD-10-CM

## 2019-04-19 PROCEDURE — 3008F PR BODY MASS INDEX (BMI) DOCUMENTED: ICD-10-PCS | Mod: CPTII,S$GLB,, | Performed by: PHYSICIAN ASSISTANT

## 2019-04-19 PROCEDURE — 99214 OFFICE O/P EST MOD 30 MIN: CPT | Mod: S$GLB,,, | Performed by: PHYSICIAN ASSISTANT

## 2019-04-19 PROCEDURE — 99214 PR OFFICE/OUTPT VISIT, EST, LEVL IV, 30-39 MIN: ICD-10-PCS | Mod: S$GLB,,, | Performed by: PHYSICIAN ASSISTANT

## 2019-04-19 PROCEDURE — 3079F DIAST BP 80-89 MM HG: CPT | Mod: CPTII,S$GLB,, | Performed by: PHYSICIAN ASSISTANT

## 2019-04-19 PROCEDURE — 3008F BODY MASS INDEX DOCD: CPT | Mod: CPTII,S$GLB,, | Performed by: PHYSICIAN ASSISTANT

## 2019-04-19 PROCEDURE — 3074F SYST BP LT 130 MM HG: CPT | Mod: CPTII,S$GLB,, | Performed by: PHYSICIAN ASSISTANT

## 2019-04-19 PROCEDURE — 3074F PR MOST RECENT SYSTOLIC BLOOD PRESSURE < 130 MM HG: ICD-10-PCS | Mod: CPTII,S$GLB,, | Performed by: PHYSICIAN ASSISTANT

## 2019-04-19 PROCEDURE — 3079F PR MOST RECENT DIASTOLIC BLOOD PRESSURE 80-89 MM HG: ICD-10-PCS | Mod: CPTII,S$GLB,, | Performed by: PHYSICIAN ASSISTANT

## 2019-04-19 PROCEDURE — 1101F PR PT FALLS ASSESS DOC 0-1 FALLS W/OUT INJ PAST YR: ICD-10-PCS | Mod: CPTII,S$GLB,, | Performed by: PHYSICIAN ASSISTANT

## 2019-04-19 PROCEDURE — 1101F PT FALLS ASSESS-DOCD LE1/YR: CPT | Mod: CPTII,S$GLB,, | Performed by: PHYSICIAN ASSISTANT

## 2019-04-19 RX ORDER — MUPIROCIN 20 MG/G
OINTMENT TOPICAL
Qty: 22 G | Refills: 0 | Status: SHIPPED | OUTPATIENT
Start: 2019-04-19 | End: 2019-07-31 | Stop reason: CLARIF

## 2019-04-19 RX ORDER — CEPHALEXIN 500 MG/1
500 CAPSULE ORAL 4 TIMES DAILY
Qty: 28 CAPSULE | Refills: 0 | Status: SHIPPED | OUTPATIENT
Start: 2019-04-19 | End: 2019-04-26

## 2019-04-19 NOTE — PATIENT INSTRUCTIONS
Skin Tear (Skin Avulsion)  A skin avulsion is a tearing of the top layer of skin. This commonly happens after a fall or other injury. It also tends to be more common in older people, or those taking blood thinners or steroids for long periods of time.  Home care  These guidelines will help you care for your wound at home:  · Keep the wound clean and dry for the first 24 to 48 hours, or as your healthcare provider advises.  · If there is a dressing or bandage, change it when it gets wet or dirty. Otherwise, leave it on for the first 24 hours, then change it once a day or as often as the doctor says.  · If stitches or staples were used, check the wound every day.  · After taking off the dressing, wash the area gently with soap and water. Clean as close to the stitches as you can. Avoid washing or rubbing the stitches directly.  · After 3 days you can keep the bandages off the wound, unless told otherwise, or there is continued drainage.  Allow the wound to be open to the air.  · Keep a thin layer of antibiotic ointment on the cut. This will keep the wound clean, make it easier to remove the stitches, and reduce scarring.  · If your wound is oozing, you can put a nonstick dressing over it. Then, reapply the bandage or dressing as you were told.  · You can shower as usual after the first 24 hours, but don't soak the area in water (no baths or swimming) until the stitches or staples are taken out.  · If surgical tape was used, keep the area clean and dry. If it becomes wet, blot it dry with a clean towel.  · If skin glue was used, don't put any creams, lotions, or antibiotic ointments on it. These can dissolve the glue. Usually the glue will flake off in about 5 to 10 days by itself. Try to resist picking it off before that so the wound doesn't open up. When it gets wet, pat it dry.  Here is some information about medicine:  · You may use over-the-counter medicine such as acetaminophen or ibuprofen to control pain,  unless another pain medicine was given. If you have chronic liver or kidney disease or ever had a stomach ulcer or gastrointestinal bleeding, talk with your doctor before using these medicines.  · If you were given antibiotics, take them until they are all used up. It is important to finish the antibiotics even if the wound looks better. This will ensure that the infection has cleared.  Follow-up care  Follow up with your healthcare provider, or as advised.  · Watch for any signs of infection, such as increasing redness, swelling, or pus coming out. If this happens, don't wait for your scheduled visit. Instead, see a doctor sooner.  · Stitches or staples are usually taken out within 5 to 14 days. This varies depending on what part of your body they are on, and the type of wound. The doctor will tell you how long stitches should be left in.   · If surgical tape was used, it is usually left on for 7 to 10 days. You can remove surgical tape after that unless you were told otherwise. If you try to remove it, and it is too hard, soaking can help. Surgical tape strips will eventually fall off on their own. If the edges of the cut pull apart, stop removing the tape or strips and follow up with your doctor  · As mentioned above, skin glue will flake off by itself in 5 to 10 days, so you don't need to pull it off.  If any X-rays were done, you will be notified of any changes that may affect your care.  When to seek medical advice  Call your healthcare provider right away if any of these occur:  · Increasing pain in the wound  · Redness, swelling, or pus coming from the wound  · Fever of 100.4ºF (38ºC) or higher, or as directed by your healthcare provider  · Sutures or staples come apart or fall out before your next appointment and the wound edges look as if they will re-open  · Surgical tape closures fall off before 7 days, and the wound edges look as if they will re-open  · Bleeding not controlled by direct pressure  Date  Last Reviewed: 9/1/2016  © 7897-7834 [employer] [address] [place] [address], [place] [address]. All rights reserved. This information is not intended as a substitute for professional medical care. Always follow your healthcare professional's instructions.      Please follow up with your Primary care provider within 2-5 days if your signs and symptoms have not resolved or worsen.     If your condition worsens or fails to improve we recommend that you receive another evaluation at the emergency room immediately or contact your primary medical clinic to discuss your concerns.   You must understand that you have received an Urgent Care treatment only and that you may be released before all of your medical problems are known or treated. You, the patient, will arrange for follow up care as instructed.     RED FLAGS/WARNING SYMPTOMS DISCUSSED WITH PATIENT THAT WOULD WARRANT EMERGENT MEDICAL ATTENTION. PATIENT VERBALIZED UNDERSTANDING.

## 2019-04-19 NOTE — PROGRESS NOTES
"Subjective:       Patient ID: Bhavna Landry is a 63 y.o. female.    Vitals:  height is 5' 2" (1.575 m) and weight is 47.6 kg (105 lb). Her temperature is 97.4 °F (36.3 °C). Her blood pressure is 122/84 and her pulse is 84. Her respiration is 18 and oxygen saturation is 97%.     Chief Complaint: Laceration    Pt has laceration on LT arm by hitting it on a door knob     Laceration    The incident occurred 2 days ago. The laceration is located on the left arm. The pain is at a severity of 4/10. The pain is mild. The pain has been constant since onset. Her tetanus status is UTD (2017).       Constitution: Negative for fatigue.   HENT: Negative for facial swelling and facial trauma.    Neck: Negative for neck stiffness.   Cardiovascular: Negative for chest trauma.   Eyes: Negative for eye trauma, double vision and blurred vision.   Gastrointestinal: Negative for abdominal trauma, abdominal pain and rectal bleeding.   Genitourinary: Negative for hematuria, missed menses, genital trauma and pelvic pain.   Musculoskeletal: Negative for pain, trauma, joint swelling and abnormal ROM of joint.   Skin: Positive for laceration. Negative for color change, wound, abrasion, erythema and bruising.   Neurological: Negative for dizziness, history of vertigo, light-headedness, coordination disturbances, altered mental status and loss of consciousness.   Hematologic/Lymphatic: Negative for history of bleeding disorder.   Psychiatric/Behavioral: Negative for altered mental status.       Objective:      Physical Exam   Constitutional: She is oriented to person, place, and time. She appears well-developed and well-nourished. She is cooperative.  Non-toxic appearance. She does not appear ill. No distress.   HENT:   Head: Normocephalic and atraumatic. Head is without abrasion, without contusion and without laceration.   Right Ear: Hearing, tympanic membrane, external ear and ear canal normal.   Left Ear: Hearing, tympanic membrane, external " ear and ear canal normal.   Nose: Nose normal. No mucosal edema, rhinorrhea or nasal deformity. No epistaxis. Right sinus exhibits no maxillary sinus tenderness and no frontal sinus tenderness. Left sinus exhibits no maxillary sinus tenderness and no frontal sinus tenderness.   Mouth/Throat: Uvula is midline, oropharynx is clear and moist and mucous membranes are normal. No trismus in the jaw. Normal dentition. No uvula swelling. No posterior oropharyngeal erythema.   Eyes: Pupils are equal, round, and reactive to light. Conjunctivae, EOM and lids are normal. Right eye exhibits no discharge. Left eye exhibits no discharge. No scleral icterus.   Sclera clear bilat   Neck: Trachea normal, normal range of motion, full passive range of motion without pain and phonation normal. Neck supple.   Cardiovascular: Normal rate, regular rhythm, normal heart sounds, intact distal pulses and normal pulses.   Pulmonary/Chest: Effort normal and breath sounds normal. No stridor. No respiratory distress.   Abdominal: Soft. Normal appearance and bowel sounds are normal. She exhibits no distension, no pulsatile midline mass and no mass. There is no tenderness.   Musculoskeletal: Normal range of motion. She exhibits no edema or deformity.   Neurological: She is alert and oriented to person, place, and time. She exhibits normal muscle tone. Coordination normal.   Skin: Skin is warm and dry. Capillary refill takes less than 2 seconds. Laceration noted. No abrasion, no bruising, no burn, no ecchymosis, no lesion and no rash noted. She is not diaphoretic. No erythema. No pallor.        Avulsion cleaned with hibiclens, irrigated with saline, bactroban and nonstick dressing applied.    Psychiatric: She has a normal mood and affect. Her speech is normal and behavior is normal. Judgment and thought content normal. Cognition and memory are normal.   Nursing note and vitals reviewed.      Assessment:       1. Avulsion of skin of forearm, left,  initial encounter        Plan:         Avulsion of skin of forearm, left, initial encounter  -     mupirocin (BACTROBAN) 2 % ointment; Apply to affected area 3 times daily  Dispense: 22 g; Refill: 0  -     cephALEXin (KEFLEX) 500 MG capsule; Take 1 capsule (500 mg total) by mouth 4 (four) times daily. for 7 days  Dispense: 28 capsule; Refill: 0        WAIT AND SEE ORAL ANTIBIOTICS GIVEN    Skin Tear (Skin Avulsion)  A skin avulsion is a tearing of the top layer of skin. This commonly happens after a fall or other injury. It also tends to be more common in older people, or those taking blood thinners or steroids for long periods of time.  Home care  These guidelines will help you care for your wound at home:  · Keep the wound clean and dry for the first 24 to 48 hours, or as your healthcare provider advises.  · If there is a dressing or bandage, change it when it gets wet or dirty. Otherwise, leave it on for the first 24 hours, then change it once a day or as often as the doctor says.  · If stitches or staples were used, check the wound every day.  · After taking off the dressing, wash the area gently with soap and water. Clean as close to the stitches as you can. Avoid washing or rubbing the stitches directly.  · After 3 days you can keep the bandages off the wound, unless told otherwise, or there is continued drainage.  Allow the wound to be open to the air.  · Keep a thin layer of antibiotic ointment on the cut. This will keep the wound clean, make it easier to remove the stitches, and reduce scarring.  · If your wound is oozing, you can put a nonstick dressing over it. Then, reapply the bandage or dressing as you were told.  · You can shower as usual after the first 24 hours, but don't soak the area in water (no baths or swimming) until the stitches or staples are taken out.  · If surgical tape was used, keep the area clean and dry. If it becomes wet, blot it dry with a clean towel.  · If skin glue was used,  don't put any creams, lotions, or antibiotic ointments on it. These can dissolve the glue. Usually the glue will flake off in about 5 to 10 days by itself. Try to resist picking it off before that so the wound doesn't open up. When it gets wet, pat it dry.  Here is some information about medicine:  · You may use over-the-counter medicine such as acetaminophen or ibuprofen to control pain, unless another pain medicine was given. If you have chronic liver or kidney disease or ever had a stomach ulcer or gastrointestinal bleeding, talk with your doctor before using these medicines.  · If you were given antibiotics, take them until they are all used up. It is important to finish the antibiotics even if the wound looks better. This will ensure that the infection has cleared.  Follow-up care  Follow up with your healthcare provider, or as advised.  · Watch for any signs of infection, such as increasing redness, swelling, or pus coming out. If this happens, don't wait for your scheduled visit. Instead, see a doctor sooner.  · Stitches or staples are usually taken out within 5 to 14 days. This varies depending on what part of your body they are on, and the type of wound. The doctor will tell you how long stitches should be left in.   · If surgical tape was used, it is usually left on for 7 to 10 days. You can remove surgical tape after that unless you were told otherwise. If you try to remove it, and it is too hard, soaking can help. Surgical tape strips will eventually fall off on their own. If the edges of the cut pull apart, stop removing the tape or strips and follow up with your doctor  · As mentioned above, skin glue will flake off by itself in 5 to 10 days, so you don't need to pull it off.  If any X-rays were done, you will be notified of any changes that may affect your care.  When to seek medical advice  Call your healthcare provider right away if any of these occur:  · Increasing pain in the wound  · Redness,  swelling, or pus coming from the wound  · Fever of 100.4ºF (38ºC) or higher, or as directed by your healthcare provider  · Sutures or staples come apart or fall out before your next appointment and the wound edges look as if they will re-open  · Surgical tape closures fall off before 7 days, and the wound edges look as if they will re-open  · Bleeding not controlled by direct pressure  Date Last Reviewed: 9/1/2016 © 2000-2017 [employer], [facility]. [address] [place] [address]. All rights reserved. This information is not intended as a substitute for professional medical care. Always follow your healthcare professional's instructions.      Please follow up with your Primary care provider within 2-5 days if your signs and symptoms have not resolved or worsen.     If your condition worsens or fails to improve we recommend that you receive another evaluation at the emergency room immediately or contact your primary medical clinic to discuss your concerns.   You must understand that you have received an Urgent Care treatment only and that you may be released before all of your medical problems are known or treated. You, the patient, will arrange for follow up care as instructed.     RED FLAGS/WARNING SYMPTOMS DISCUSSED WITH PATIENT THAT WOULD WARRANT EMERGENT MEDICAL ATTENTION. PATIENT VERBALIZED UNDERSTANDING.

## 2019-04-22 ENCOUNTER — TELEPHONE (OUTPATIENT)
Dept: PAIN MEDICINE | Facility: CLINIC | Age: 64
End: 2019-04-22

## 2019-04-22 NOTE — TELEPHONE ENCOUNTER
----- Message from Frida Brennan sent at 4/22/2019  4:37 PM CDT -----  Contact: [phone]/self  Patient is requesting a call back. She is having severe back pain. Thanks

## 2019-04-22 NOTE — TELEPHONE ENCOUNTER
Returned call to pt.  Pt states that pain was improved following procedure for short time, and now has increased to a 9/10.  Noted that pt has follow up appt scheduled on 4/24/19.  Offered sooner appt, but pt declined stating she will discuss the situation at her clinic visit on Wednesday, 4/24/19.

## 2019-04-23 RX ORDER — TRAMADOL HYDROCHLORIDE 50 MG/1
TABLET ORAL
Qty: 60 TABLET | Refills: 0 | Status: SHIPPED | OUTPATIENT
Start: 2019-04-23 | End: 2019-05-23 | Stop reason: SDUPTHER

## 2019-04-24 ENCOUNTER — TELEPHONE (OUTPATIENT)
Dept: PAIN MEDICINE | Facility: CLINIC | Age: 64
End: 2019-04-24

## 2019-04-24 ENCOUNTER — OFFICE VISIT (OUTPATIENT)
Dept: PAIN MEDICINE | Facility: CLINIC | Age: 64
End: 2019-04-24
Payer: MEDICARE

## 2019-04-24 ENCOUNTER — TELEPHONE (OUTPATIENT)
Dept: INTERNAL MEDICINE | Facility: CLINIC | Age: 64
End: 2019-04-24

## 2019-04-24 VITALS
SYSTOLIC BLOOD PRESSURE: 122 MMHG | BODY MASS INDEX: 19.35 KG/M2 | DIASTOLIC BLOOD PRESSURE: 84 MMHG | HEART RATE: 70 BPM | WEIGHT: 105.81 LBS

## 2019-04-24 DIAGNOSIS — M53.3 SACROILIAC JOINT DYSFUNCTION: Primary | ICD-10-CM

## 2019-04-24 DIAGNOSIS — M79.18 MYOFASCIAL PAIN SYNDROME: ICD-10-CM

## 2019-04-24 DIAGNOSIS — M54.5 BILATERAL LOW BACK PAIN, UNSPECIFIED CHRONICITY, WITH SCIATICA PRESENCE UNSPECIFIED: ICD-10-CM

## 2019-04-24 DIAGNOSIS — G89.4 CHRONIC PAIN SYNDROME: ICD-10-CM

## 2019-04-24 DIAGNOSIS — M53.3 SACROILIAC JOINT DYSFUNCTION OF BOTH SIDES: Primary | ICD-10-CM

## 2019-04-24 DIAGNOSIS — M46.1 BILATERAL SACROILIITIS: ICD-10-CM

## 2019-04-24 DIAGNOSIS — G89.29 CHRONIC RIGHT-SIDED LOW BACK PAIN WITHOUT SCIATICA: ICD-10-CM

## 2019-04-24 DIAGNOSIS — M53.3 SACROILIAC PAIN: Primary | ICD-10-CM

## 2019-04-24 DIAGNOSIS — M54.5 LOW BACK PAIN, UNSPECIFIED BACK PAIN LATERALITY, UNSPECIFIED CHRONICITY, WITH SCIATICA PRESENCE UNSPECIFIED: ICD-10-CM

## 2019-04-24 DIAGNOSIS — M54.50 CHRONIC RIGHT-SIDED LOW BACK PAIN WITHOUT SCIATICA: ICD-10-CM

## 2019-04-24 PROCEDURE — 3074F SYST BP LT 130 MM HG: CPT | Mod: CPTII,S$GLB,, | Performed by: NURSE PRACTITIONER

## 2019-04-24 PROCEDURE — 3079F PR MOST RECENT DIASTOLIC BLOOD PRESSURE 80-89 MM HG: ICD-10-PCS | Mod: CPTII,S$GLB,, | Performed by: NURSE PRACTITIONER

## 2019-04-24 PROCEDURE — 3079F DIAST BP 80-89 MM HG: CPT | Mod: CPTII,S$GLB,, | Performed by: NURSE PRACTITIONER

## 2019-04-24 PROCEDURE — 3008F PR BODY MASS INDEX (BMI) DOCUMENTED: ICD-10-PCS | Mod: CPTII,S$GLB,, | Performed by: NURSE PRACTITIONER

## 2019-04-24 PROCEDURE — 1101F PR PT FALLS ASSESS DOC 0-1 FALLS W/OUT INJ PAST YR: ICD-10-PCS | Mod: CPTII,S$GLB,, | Performed by: NURSE PRACTITIONER

## 2019-04-24 PROCEDURE — 99999 PR PBB SHADOW E&M-EST. PATIENT-LVL IV: ICD-10-PCS | Mod: PBBFAC,,, | Performed by: NURSE PRACTITIONER

## 2019-04-24 PROCEDURE — 99499 UNLISTED E&M SERVICE: CPT | Mod: S$GLB,,, | Performed by: NURSE PRACTITIONER

## 2019-04-24 PROCEDURE — 99999 PR PBB SHADOW E&M-EST. PATIENT-LVL IV: CPT | Mod: PBBFAC,,, | Performed by: NURSE PRACTITIONER

## 2019-04-24 PROCEDURE — 3008F BODY MASS INDEX DOCD: CPT | Mod: CPTII,S$GLB,, | Performed by: NURSE PRACTITIONER

## 2019-04-24 PROCEDURE — 1101F PT FALLS ASSESS-DOCD LE1/YR: CPT | Mod: CPTII,S$GLB,, | Performed by: NURSE PRACTITIONER

## 2019-04-24 PROCEDURE — 99213 OFFICE O/P EST LOW 20 MIN: CPT | Mod: S$GLB,,, | Performed by: NURSE PRACTITIONER

## 2019-04-24 PROCEDURE — 99499 RISK ADDL DX/OHS AUDIT: ICD-10-PCS | Mod: S$GLB,,, | Performed by: NURSE PRACTITIONER

## 2019-04-24 PROCEDURE — 3074F PR MOST RECENT SYSTOLIC BLOOD PRESSURE < 130 MM HG: ICD-10-PCS | Mod: CPTII,S$GLB,, | Performed by: NURSE PRACTITIONER

## 2019-04-24 PROCEDURE — 99213 PR OFFICE/OUTPT VISIT, EST, LEVL III, 20-29 MIN: ICD-10-PCS | Mod: S$GLB,,, | Performed by: NURSE PRACTITIONER

## 2019-04-24 NOTE — PROGRESS NOTES
[facility] Established Patient Evaluation    Referred by: Sadaf Vargas MD  Reason for referral:M54.9,G89.29 (ICD-10-CM) - Chronic right-sided back pain, unspecified back location    CC:   Chief Complaint   Patient presents with    Hip Pain     bilateral        Last 3 PDI Scores 4/24/2019 3/11/2019 1/28/2019   Pain Disability Index (PDI) 14 13 29     Interval Update: 4/24/19 Pt returns today S/P BILATERAL Dorsal Ramus Block at L5 on 4/24/19 with 60-80% relief for 1-2 days and pain returned.       3/11/19 - Pt returns for follow up she is S/P BILATERAL Sacroiliac Joint Injection on 2/7/19 with 90 % relief for three days. Pt reports 6/10 low back and bilateral hip pain.  She is requesting a brace for the low back to help facilitate participation in ADLs that require extended walking.    1/29/19 - Pt returns for follow up she reports 8/10 low back  bilateral hip pain.  She reports return of her low back pain in the area of the SI joint 1-2 weeks ago.  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    9/7/18 - Pt returns to clinic reporting severely exacerbated low back pain bilaterally following the last session of physical therapy.  She endorses sharp, achy, stabbing pain in the areas of the SI joints and would like to repeat the SI injection she received a few weeks ago.  She reported 100% relief of lbp following the SI injection that last up until a few days ago when she experienced the exacerbation.  Overall, her PDI is improved which shows progress.    Background:  Bhavna Landry is a 63 y.o. female referred for right sided back pain to right upper buttocks.  She points to the middle of the right buttock 1-2 inches to the right of midline.    Location: back  Severity: Currently: 7/10   Typical Range: 7/10     Exacerbation: 7/10   Onset: 3-4 months  Quality: Burning and constant  Radiation: none  Axial/Extremity Percentage of Pain: 100%  Exacerbating Factors: prolonged sitting,  standing, rolling over in bed  Mitigating Factors: aleve  Assoc: denies night fever/night sweats, urinary incontinence, bowel incontinence, significant weight loss, significant motor weakness and loss of sensations    Previous Therapies:  PT:  Scheduled to start this coming Friday 8/17/18  HEP:  damaris  TENS: none  Injections: Low Back 10-15 yrs ago, with relief - unsure of which injection she had  Surgery:    Right TKA and revision due to nickel allergy  Medications:   - NSAIDS: Yes  - MSK Relaxants: Yes, tizanidine  - TCAs:   - SNRIs: Yes  - Topicals:   - Anticonvulsants:  - Opioids: Yes    Current Pain Medications:  1. Aleve   2. Tramadol  3. Tylenol  4. Effexor    Full Medication List:    Current Outpatient Medications:     albuterol (PROVENTIL/VENTOLIN HFA) 90 mcg/actuation inhaler, Inhale 2 puffs into the lungs every 6 (six) hours as needed for Wheezing., Disp: 1 each, Rfl: 11    albuterol (VENTOLIN HFA) 90 mcg/actuation inhaler, Inhale 2 puffs into the lungs every 6 (six) hours as needed for Wheezing. Rescue, Disp: 1 each, Rfl: 11    aspirin 325 MG tablet, Take 325 mg by mouth once daily., Disp: , Rfl:     atorvastatin (LIPITOR) 40 MG tablet, TAKE 1 TABLET(40 MG) BY MOUTH EVERY DAY, Disp: 90 tablet, Rfl: 0    baclofen (LIORESAL) 20 MG tablet, Take 1 tablet (20 mg total) by mouth 3 (three) times daily., Disp: 90 tablet, Rfl: 11    cephALEXin (KEFLEX) 500 MG capsule, Take 1 capsule (500 mg total) by mouth 4 (four) times daily. for 7 days, Disp: 28 capsule, Rfl: 0    diclofenac sodium (VOLTAREN) 1 % Gel, Apply 2 g topically once daily., Disp: 1 Tube, Rfl: 3    hydroCHLOROthiazide (HYDRODIURIL) 25 MG tablet, Take 1 tablet (25 mg total) by mouth once daily., Disp: 30 tablet, Rfl: 5    lisinopril 10 MG tablet, Take 10 mg by mouth once daily., Disp: , Rfl:     mupirocin (BACTROBAN) 2 % ointment, Apply to affected area 3 times daily, Disp: 22 g, Rfl: 0    risperiDONE (RISPERDAL) 4 MG tablet, Take 1 tablet  (4 mg total) by mouth 2 (two) times daily., Disp: 60 tablet, Rfl: 11    secukinumab (COSENTYX PEN) 150 mg/mL PnIj, Inject 150 mg into the skin every 4 weeks, Disp: 1 mL, Rfl: 0    sulfaSALAzine (AZULFIDINE) 500 MG TbEC, TAKE 1 TABLET BY MOUTH TWICE DAILY FOR 1 WEEK, THEN 2 TABLETS TWICE DAILY FOR 1 WEEK, THEN 3 TABLETS BY MOUTH TWICE DAILY, Disp: 225 tablet, Rfl: 1    sulfaSALAzine (AZULFIDINE) 500 MG TbEC, TAKE 3 TABLETS(1500 MG) BY MOUTH TWICE DAILY, Disp: 540 tablet, Rfl: 0    tobramycin-dexamethasone 0.3-0.1% (TOBRADEX) 0.3-0.1 % Oint, Place a thin ribbon of ointment to suture line 3 times daily., Disp: 3.5 g, Rfl: 0    traMADol (ULTRAM) 50 mg tablet, TAKE 1 TABLET BY MOUTH EVERY 12 HOURS AS NEEDED FOR PAIN, Disp: 60 tablet, Rfl: 0    venlafaxine (EFFEXOR-XR) 150 MG Cp24, Take 1 capsule (150 mg total) by mouth once daily., Disp: 30 capsule, Rfl: 11    amLODIPine (NORVASC) 5 MG tablet, Take 1 tablet (5 mg total) by mouth once daily., Disp: 30 tablet, Rfl: 0    gabapentin (NEURONTIN) 300 MG capsule, Take 1 capsule (300 mg total) by mouth every evening., Disp: 60 capsule, Rfl: 5     Review of Systems:  Review of Systems   Constitutional: Negative for chills and fever.   HENT: Negative for nosebleeds.    Eyes: Negative for pain.   Respiratory: Negative for hemoptysis.    Cardiovascular: Negative for chest pain.   Gastrointestinal: Negative for nausea and vomiting.   Genitourinary: Negative for dysuria.   Musculoskeletal: Positive for back pain and joint pain. Negative for falls.   Skin: Negative for rash.   Neurological: Negative for tingling and focal weakness.   Endo/Heme/Allergies: Does not bruise/bleed easily.   Psychiatric/Behavioral: Negative for depression. The patient is not nervous/anxious.        Allergies:  Chloramphenicol sod succinate; Etanercept; Nickel sutures [surgical stainless steel]; and Adhesive     Medical History:  Past Medical History:   Diagnosis Date    Allergy     Amblyopia      Anemia     Arthritis 1992    Cataract     Depression     Dry eyes     Dry mouth     Fibromyalgia 2014    Fibromyalgia     GERD (gastroesophageal reflux disease)     History of psychiatric hospitalization     Hyperlipidemia 1992    Hypertension     Kidney stone     Migraine headache     Osteoporosis     Psoriatic arthritis 1992    Right knee pain     post knee replacement surgery (possible rejectiion of metal)    RLS (restless legs syndrome)     Schizophrenia 1992    stable on meds    Urinary tract infection         Surgical History:  Past Surgical History:   Procedure Laterality Date    ARTHROPLASTY, KNEE, TOTAL Right 2013    Performed by Philipp Begum MD at [facility] OR 2ND FLR    Bilateral Sacroiliac Joint Injection - Per Dr Pineda, not necessary to hold ASA. Bilateral 2019    Performed by Michelle Pineda Jr., MD at [facility]IN MGT    BLOCK, SACROILIAC JOINT-BILATERAL Bilateral 2018    Performed by Michelle [facility]ineda Jr., MD at [facility]    BLOCK, SACROILIAC JOINT-Right- ORAL SEDATION Right 2018    Performed by Michelle [facility]ineda Jr., MD at [facility]    brow ptosis repair Right 2019    Surgeon: Dr. Karla Henson     SECTION      COLONOSCOPY N/A 2016    Performed by ANDRIA Connelly MD at [facility] ENDO (4TH FLR)    COLONOSCOPY N/A 2013    Performed by Didier Poole MD at [facility] END[facility] (4TH FLR)    cyst removed from right sinus  1982    GRAFT-FEMORAL AUTOLOGOUS BONE GRAFTING Right 2014    [facility] at [facility]    HYSTERECTOMY      VAGINAL HYSTERECTOMY WITHOUT BSO - ENDOMETRIOSIS    JOINT REPLACEMENT Right     knee    KNEE SURGERY      Lumbo-sacral Block, DR5 and Lateral Branches of S1,S2, S3 N/A 2019    Performed by Michelle Pineda Jr., MD at [facility] PA[facility]GT    REVISION-ARTHROPLASTY-KNEE-TOTAL Right 2014    Performed by [facility] at [facility] OR    Surgery  on right knee  1982    tumor removed from back left side upper shoulder  2006        Social History:  Social History     Socioeconomic History    Marital status:      Spouse name: Not on file    Number of children: 1    Years of education: Not on file    Highest education level: Not on file   Occupational History    Occupation: retired   [address].     Employer: 2010   Social Needs    Financial resource strain: Not on file    Food insecurity:     Worry: Not on file     Inability: Not on file    Transportation needs:     Medical: Not on file     Non-medical: Not on file   Tobacco Use    Smoking status: Former Smoker     Packs/day: 0.50     Years: 10.00     Pack years: 5.00     Types: Cigarettes     Last attempt to quit: 2012     Years since quittin.9    Smokeless tobacco: Former User    Tobacco comment: stopped smoking .     Substance and Sexual Activity    Alcohol use: No    Drug use: No    Sexual activity: Never     Partners: Male     Birth control/protection: Post-menopausal   Lifestyle    Physical activity:     Days per week: Not on file     Minutes per session: Not on file    Stress: Not on file   Relationships    Social connections:     Talks on phone: Not on file     Gets together: Not on file     Attends Voodoo service: Not on file     Active member of club or organization: Not on file     Attends meetings of clubs or organizations: Not on file     Relationship status: Not on file   Other Topics Concern    Patient feels they ought to cut down on drinking/drug use Not Asked    Patient annoyed by others criticizing their drinking/drug use Not Asked    Patient has felt bad or guilty about drinking/drug use Not Asked    Patient has had a drink/used drugs as an eye opener in the AM Not Asked    Are you pregnant or think you may be? Not Asked    Breast-feeding Not Asked   Social History Narrative    Exercise:  Childcare.        Ett:   3 days a week       Physical Exam:  Vitals:    19 1036   BP: 122/84   Pulse: 70   Weight: 48 kg (105 lb 13.1 oz)   PainSc:   9     General    Nursing note and vitals reviewed.  Constitutional: She is oriented to person, place, and time. She appears well-developed and well-nourished. No distress.   HENT:   Head: Normocephalic and atraumatic.   Nose: Nose normal.   Eyes: Conjunctivae and EOM are normal. Pupils are equal, round, and reactive to light. Right eye exhibits no discharge. Left eye exhibits no discharge. No scleral icterus.   Neck: No JVD present.   Cardiovascular: Intact distal pulses.    Pulmonary/Chest: Effort normal. No respiratory distress.   Abdominal: She exhibits no distension.   Neurological: She is alert and oriented to person, place, and time. Coordination normal.   Psychiatric: She has a normal mood and affect. Her behavior is normal. Judgment and thought content normal.     General Musculoskeletal Exam   Gait: antalgic         Right Hip Exam   Right hip exam is normal.     Tenderness   The patient tender to palpation of the SI joint.    Tests   Pain w/ forced internal rotation (HELIO): present  Back (L-Spine & T-Spine) / Neck (C-Spine) Exam     Tenderness Right paramedian tenderness of the Sacrum.         Imagin18 - X-Ray Lumbar Spine Ap And Lateral   Narrative    EXAMINATION:  XR LUMBAR SPINE AP AND LATERAL    CLINICAL HISTORY:  Low back pain, >6wks conservative tx, persistent-progressive sx, surgical candidate;pain r low back;Dorsalgia, unspecified    TECHNIQUE:  AP, lateral and spot images were performed of the lumbar spine.    COMPARISON:  None    FINDINGS:  DJD and lumbar scoliosis.  The L1/L2 and the L2/L3 disc spaces are significantly narrowed.  No fracture, spondylolisthesis or bone destruction identified        Labs:  BMP  Lab Results   Component Value Date     (L) 2019    K 4.3 2019     2019    CO2 28 2019    BUN 9 2019     CREATININE 1.0 04/01/2019    CALCIUM 9.7 04/01/2019    ANIONGAP 7 (L) 04/01/2019    ESTGFRAFRICA >60.0 04/01/2019    EGFRNONAA 59.3 (A) 04/01/2019     Lab Results   Component Value Date    ALT 15 02/08/2019    AST 20 02/08/2019    GGT 91 (H) 07/28/2014    ALKPHOS 74 02/08/2019    BILITOT 0.2 02/08/2019       Assessment:  Problem List Items Addressed This Visit        Neuro    Sacroiliac joint dysfunction - Primary    Chronic pain       Orthopedic    Low back pain    Myofascial pain syndrome      Other Visit Diagnoses     Bilateral sacroiliitis              This is a 63-year-old female with chronic low back pain the past 4 months.  The pain is nonradiating and exacerbated by prolonged sitting, standing, rolling over in bed.  Her physical exam demonstrates a positive HELIO sign on the right.  The history and exam are consistent with right sacroiliac joint dysfunction.  The patient states pain limits her ability to accomplish some activities of daily living and I have recommended a right sacroiliac joint injection for diagnostic and therapeutic purposes.  This injection will also facilitate her participation in physical therapy which is scheduled to start this coming Friday, August 17th.    1/28/19 - Previous SI injection lasted 5 months and patient would like to repeat the injection, which is appropriate for helping to restore function.    3/11/19 - sacroiliac joint injection provided excellent diagnostic value in that confirmed the location of her pain with 90% relief reported for 3 days.  She did not receive prolonged relief from the injection and additional SI injections are not indicated.  I spent time discussing with her additional treatment options including the Coolief procedure, which will require diagnostic blocks prior to it.  Should that fail to provide her at least 6 months of relief, she may be a candidate for SI fusion.    4/24/2019- 89 y/o female s/p bilateral  Dorsal ramus block(DR5, S1,S2,S3) with  reported  60-80% relief for 1-2 days and pain returned, she presented pain diary which reflected her relief. Dicussed that I recommend a Right then Left DR 5, Lateral branches of  S1, S2,S3 RFA for sustained relief.     Treatment Plan:   DME: pt received support brace.   PT/OT/HEP: I requested that she return to PT after the injection even though she associates it with increasing her pain.  Procedures:  S/F  DR5 + Lateral Branches of S1, S2, and S3 RFA right then Left.  Medications:    - Recommended she continue  to maximize her use of heat/ice, tylenol, and rest.  Imaging: No additional imaging recommended at this time.  Labs: Reviewed.  Medications are appropriately dosed for current hepatorenal function.    Follow Up: RTC p kumar Brar, NP-C  [facility]        Disclaimer: This note was partly generated using dictation software which may occasionally result in transcription errors.

## 2019-04-24 NOTE — H&P (VIEW-ONLY)
[facility] Established Patient Evaluation    Referred by: Sadaf Vargas MD  Reason for referral:M54.9,G89.29 (ICD-10-CM) - Chronic right-sided back pain, unspecified back location    CC:   Chief Complaint   Patient presents with    Hip Pain     bilateral        Last 3 PDI Scores 4/24/2019 3/11/2019 1/28/2019   Pain Disability Index (PDI) 14 13 29     Interval Update: 4/24/19 Pt returns today S/P BILATERAL Dorsal Ramus Block at L5 on 4/24/19 with 60-80% relief for 1-2 days and pain returned.       3/11/19 - Pt returns for follow up she is S/P BILATERAL Sacroiliac Joint Injection on 2/7/19 with 90 % relief for three days. Pt reports 6/10 low back and bilateral hip pain.  She is requesting a brace for the low back to help facilitate participation in ADLs that require extended walking.    1/29/19 - Pt returns for follow up she reports 8/10 low back  bilateral hip pain.  She reports return of her low back pain in the area of the SI joint 1-2 weeks ago.  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    9/7/18 - Pt returns to clinic reporting severely exacerbated low back pain bilaterally following the last session of physical therapy.  She endorses sharp, achy, stabbing pain in the areas of the SI joints and would like to repeat the SI injection she received a few weeks ago.  She reported 100% relief of lbp following the SI injection that last up until a few days ago when she experienced the exacerbation.  Overall, her PDI is improved which shows progress.    Background:  Bhavna Landry is a 63 y.o. female referred for right sided back pain to right upper buttocks.  She points to the middle of the right buttock 1-2 inches to the right of midline.    Location: back  Severity: Currently: 7/10   Typical Range: 7/10     Exacerbation: 7/10   Onset: 3-4 months  Quality: Burning and constant  Radiation: none  Axial/Extremity Percentage of Pain: 100%  Exacerbating Factors: prolonged sitting,  standing, rolling over in bed  Mitigating Factors: aleve  Assoc: denies night fever/night sweats, urinary incontinence, bowel incontinence, significant weight loss, significant motor weakness and loss of sensations    Previous Therapies:  PT:  Scheduled to start this coming Friday 8/17/18  HEP:  damaris  TENS: none  Injections: Low Back 10-15 yrs ago, with relief - unsure of which injection she had  Surgery:    Right TKA and revision due to nickel allergy  Medications:   - NSAIDS: Yes  - MSK Relaxants: Yes, tizanidine  - TCAs:   - SNRIs: Yes  - Topicals:   - Anticonvulsants:  - Opioids: Yes    Current Pain Medications:  1. Aleve   2. Tramadol  3. Tylenol  4. Effexor    Full Medication List:    Current Outpatient Medications:     albuterol (PROVENTIL/VENTOLIN HFA) 90 mcg/actuation inhaler, Inhale 2 puffs into the lungs every 6 (six) hours as needed for Wheezing., Disp: 1 each, Rfl: 11    albuterol (VENTOLIN HFA) 90 mcg/actuation inhaler, Inhale 2 puffs into the lungs every 6 (six) hours as needed for Wheezing. Rescue, Disp: 1 each, Rfl: 11    aspirin 325 MG tablet, Take 325 mg by mouth once daily., Disp: , Rfl:     atorvastatin (LIPITOR) 40 MG tablet, TAKE 1 TABLET(40 MG) BY MOUTH EVERY DAY, Disp: 90 tablet, Rfl: 0    baclofen (LIORESAL) 20 MG tablet, Take 1 tablet (20 mg total) by mouth 3 (three) times daily., Disp: 90 tablet, Rfl: 11    cephALEXin (KEFLEX) 500 MG capsule, Take 1 capsule (500 mg total) by mouth 4 (four) times daily. for 7 days, Disp: 28 capsule, Rfl: 0    diclofenac sodium (VOLTAREN) 1 % Gel, Apply 2 g topically once daily., Disp: 1 Tube, Rfl: 3    hydroCHLOROthiazide (HYDRODIURIL) 25 MG tablet, Take 1 tablet (25 mg total) by mouth once daily., Disp: 30 tablet, Rfl: 5    lisinopril 10 MG tablet, Take 10 mg by mouth once daily., Disp: , Rfl:     mupirocin (BACTROBAN) 2 % ointment, Apply to affected area 3 times daily, Disp: 22 g, Rfl: 0    risperiDONE (RISPERDAL) 4 MG tablet, Take 1 tablet  (4 mg total) by mouth 2 (two) times daily., Disp: 60 tablet, Rfl: 11    secukinumab (COSENTYX PEN) 150 mg/mL PnIj, Inject 150 mg into the skin every 4 weeks, Disp: 1 mL, Rfl: 0    sulfaSALAzine (AZULFIDINE) 500 MG TbEC, TAKE 1 TABLET BY MOUTH TWICE DAILY FOR 1 WEEK, THEN 2 TABLETS TWICE DAILY FOR 1 WEEK, THEN 3 TABLETS BY MOUTH TWICE DAILY, Disp: 225 tablet, Rfl: 1    sulfaSALAzine (AZULFIDINE) 500 MG TbEC, TAKE 3 TABLETS(1500 MG) BY MOUTH TWICE DAILY, Disp: 540 tablet, Rfl: 0    tobramycin-dexamethasone 0.3-0.1% (TOBRADEX) 0.3-0.1 % Oint, Place a thin ribbon of ointment to suture line 3 times daily., Disp: 3.5 g, Rfl: 0    traMADol (ULTRAM) 50 mg tablet, TAKE 1 TABLET BY MOUTH EVERY 12 HOURS AS NEEDED FOR PAIN, Disp: 60 tablet, Rfl: 0    venlafaxine (EFFEXOR-XR) 150 MG Cp24, Take 1 capsule (150 mg total) by mouth once daily., Disp: 30 capsule, Rfl: 11    amLODIPine (NORVASC) 5 MG tablet, Take 1 tablet (5 mg total) by mouth once daily., Disp: 30 tablet, Rfl: 0    gabapentin (NEURONTIN) 300 MG capsule, Take 1 capsule (300 mg total) by mouth every evening., Disp: 60 capsule, Rfl: 5     Review of Systems:  Review of Systems   Constitutional: Negative for chills and fever.   HENT: Negative for nosebleeds.    Eyes: Negative for pain.   Respiratory: Negative for hemoptysis.    Cardiovascular: Negative for chest pain.   Gastrointestinal: Negative for nausea and vomiting.   Genitourinary: Negative for dysuria.   Musculoskeletal: Positive for back pain and joint pain. Negative for falls.   Skin: Negative for rash.   Neurological: Negative for tingling and focal weakness.   Endo/Heme/Allergies: Does not bruise/bleed easily.   Psychiatric/Behavioral: Negative for depression. The patient is not nervous/anxious.        Allergies:  Chloramphenicol sod succinate; Etanercept; Nickel sutures [surgical stainless steel]; and Adhesive     Medical History:  Past Medical History:   Diagnosis Date    Allergy     Amblyopia      Anemia     Arthritis 1992    Cataract     Depression     Dry eyes     Dry mouth     Fibromyalgia 2014    Fibromyalgia     GERD (gastroesophageal reflux disease)     History of psychiatric hospitalization     Hyperlipidemia 1992    Hypertension     Kidney stone     Migraine headache     Osteoporosis     Psoriatic arthritis 1992    Right knee pain     post knee replacement surgery (possible rejectiion of metal)    RLS (restless legs syndrome)     Schizophrenia 1992    stable on meds    Urinary tract infection         Surgical History:  Past Surgical History:   Procedure Laterality Date    ARTHROPLASTY, KNEE, TOTAL Right 2013    Performed by [facility] at [facility] OR 2ND FLR    Bilateral Sacroiliac Joint Injection - Per Dr Pineda, not necessary to hold ASA. Bilateral 2019    Performed by Michelle [facility]ineda Jr., [facility] at [facility] PAIN MGT    BLOCK, SACROILIAC JOINT-BILATERAL Bilateral 2018    Performed by Michelle [facility]a Jr., [facility] at [facility] PAIN MGT    BLOCK, SACROILIAC JOINT-Right- ORAL SEDATION Right 2018    Performed by Michelle [facility]a Jr., [facility] at [facility] PAIN MGT    brow ptosis repair Right 2019    Surgeon: Dr. Karla Henson     SECTION      COLONOSCOPY N/A 2016    Performed by ANDRIA Connelly MD at [facility] ENDO (4TH FLR)    COLONOSCOPY N/A 2013    Performed by Didier Poole MD at [facility] ENDO (4TH FLR)    cyst removed from right sinus  1982    GRAFT-FEMORAL AUTOLOGOUS BONE GRAFTING Right 2014    Performed [facility] at [facility]    HYSTERECTOMY      VAGINAL HYSTERECTOMY WITHOUT BSO - ENDOMETRIOSIS    JOINT REPLACEMENT Right     knee    KNEE SURGERY      Lumbo-sacral Block, DR5 and Lateral Branches of S1,S2, S3 N/A 2019    Performed by Michelle [facility]a Jr., [facility] at [facility] PAIN MGT    REVISION-ARTHROPLASTY-KNEE-TOTAL Right 2014    [facility] by [facility] at [facility] OR    Surgery  on right knee  1982    tumor removed from back left side upper shoulder  2006        Social History:  Social History     Socioeconomic History    Marital status:      Spouse name: Not on file    Number of children: 1    Years of education: Not on file    Highest education level: Not on file   Occupational History    Occupation: retired   [address].     Employer: 2010   Social Needs    Financial resource strain: Not on file    Food insecurity:     Worry: Not on file     Inability: Not on file    Transportation needs:     Medical: Not on file     Non-medical: Not on file   Tobacco Use    Smoking status: Former Smoker     Packs/day: 0.50     Years: 10.00     Pack years: 5.00     Types: Cigarettes     Last attempt to quit: 2012     Years since quittin.9    Smokeless tobacco: Former User    Tobacco comment: stopped smoking .     Substance and Sexual Activity    Alcohol use: No    Drug use: No    Sexual activity: Never     Partners: Male     Birth control/protection: Post-menopausal   Lifestyle    Physical activity:     Days per week: Not on file     Minutes per session: Not on file    Stress: Not on file   Relationships    Social connections:     Talks on phone: Not on file     Gets together: Not on file     Attends Spiritism service: Not on file     Active member of club or organization: Not on file     Attends meetings of clubs or organizations: Not on file     Relationship status: Not on file   Other Topics Concern    Patient feels they ought to cut down on drinking/drug use Not Asked    Patient annoyed by others criticizing their drinking/drug use Not Asked    Patient has felt bad or guilty about drinking/drug use Not Asked    Patient has had a drink/used drugs as an eye opener in the AM Not Asked    Are you pregnant or think you may be? Not Asked    Breast-feeding Not Asked   Social History Narrative    Exercise:  Childcare.        Ett:   3 days a week       Physical Exam:  Vitals:    19 1036   BP: 122/84   Pulse: 70   Weight: 48 kg (105 lb 13.1 oz)   PainSc:   9     General    Nursing note and vitals reviewed.  Constitutional: She is oriented to person, place, and time. She appears well-developed and well-nourished. No distress.   HENT:   Head: Normocephalic and atraumatic.   Nose: Nose normal.   Eyes: Conjunctivae and EOM are normal. Pupils are equal, round, and reactive to light. Right eye exhibits no discharge. Left eye exhibits no discharge. No scleral icterus.   Neck: No JVD present.   Cardiovascular: Intact distal pulses.    Pulmonary/Chest: Effort normal. No respiratory distress.   Abdominal: She exhibits no distension.   Neurological: She is alert and oriented to person, place, and time. Coordination normal.   Psychiatric: She has a normal mood and affect. Her behavior is normal. Judgment and thought content normal.     General Musculoskeletal Exam   Gait: antalgic         Right Hip Exam   Right hip exam is normal.     Tenderness   The patient tender to palpation of the SI joint.    Tests   Pain w/ forced internal rotation (HELIO): present  Back (L-Spine & T-Spine) / Neck (C-Spine) Exam     Tenderness Right paramedian tenderness of the Sacrum.         Imagin18 - X-Ray Lumbar Spine Ap And Lateral   Narrative    EXAMINATION:  XR LUMBAR SPINE AP AND LATERAL    CLINICAL HISTORY:  Low back pain, >6wks conservative tx, persistent-progressive sx, surgical candidate;pain r low back;Dorsalgia, unspecified    TECHNIQUE:  AP, lateral and spot images were performed of the lumbar spine.    COMPARISON:  None    FINDINGS:  DJD and lumbar scoliosis.  The L1/L2 and the L2/L3 disc spaces are significantly narrowed.  No fracture, spondylolisthesis or bone destruction identified        Labs:  BMP  Lab Results   Component Value Date     (L) 2019    K 4.3 2019     2019    CO2 28 2019    BUN 9 2019     CREATININE 1.0 04/01/2019    CALCIUM 9.7 04/01/2019    ANIONGAP 7 (L) 04/01/2019    ESTGFRAFRICA >60.0 04/01/2019    EGFRNONAA 59.3 (A) 04/01/2019     Lab Results   Component Value Date    ALT 15 02/08/2019    AST 20 02/08/2019    GGT 91 (H) 07/28/2014    ALKPHOS 74 02/08/2019    BILITOT 0.2 02/08/2019       Assessment:  Problem List Items Addressed This Visit        Neuro    Sacroiliac joint dysfunction - Primary    Chronic pain       Orthopedic    Low back pain    Myofascial pain syndrome      Other Visit Diagnoses     Bilateral sacroiliitis              This is a 63-year-old female with chronic low back pain the past 4 months.  The pain is nonradiating and exacerbated by prolonged sitting, standing, rolling over in bed.  Her physical exam demonstrates a positive HELIO sign on the right.  The history and exam are consistent with right sacroiliac joint dysfunction.  The patient states pain limits her ability to accomplish some activities of daily living and I have recommended a right sacroiliac joint injection for diagnostic and therapeutic purposes.  This injection will also facilitate her participation in physical therapy which is scheduled to start this coming Friday, August 17th.    1/28/19 - Previous SI injection lasted 5 months and patient would like to repeat the injection, which is appropriate for helping to restore function.    3/11/19 - sacroiliac joint injection provided excellent diagnostic value in that confirmed the location of her pain with 90% relief reported for 3 days.  She did not receive prolonged relief from the injection and additional SI injections are not indicated.  I spent time discussing with her additional treatment options including the Coolief procedure, which will require diagnostic blocks prior to it.  Should that fail to provide her at least 6 months of relief, she may be a candidate for SI fusion.    4/24/2019- 89 y/o female s/p bilateral  Dorsal ramus block(DR5, S1,S2,S3) with  reported  60-80% relief for 1-2 days and pain returned, she presented pain diary which reflected her relief. Dicussed that I recommend a Right then Left DR 5, Lateral branches of  S1, S2,S3 RFA for sustained relief.     Treatment Plan:   DME: pt received support brace.   PT/OT/HEP: I requested that she return to PT after the injection even though she associates it with increasing her pain.  Procedures:  S/F  DR5 + Lateral Branches of S1, S2, and S3 RFA right then Left.  Medications:    - Recommended she continue  to maximize her use of heat/ice, tylenol, and rest.  Imaging: No additional imaging recommended at this time.  Labs: Reviewed.  Medications are appropriately dosed for current hepatorenal function.    Follow Up: RTC p kumar Brar, NP-C  [facility]        Disclaimer: This note was partly generated using dictation software which may occasionally result in transcription errors.

## 2019-04-24 NOTE — H&P (VIEW-ONLY)
[facility] Established Patient Evaluation    Referred by: Sadaf Vargas MD  Reason for referral:M54.9,G89.29 (ICD-10-CM) - Chronic right-sided back pain, unspecified back location    CC:   Chief Complaint   Patient presents with    Hip Pain     bilateral        Last 3 PDI Scores 4/24/2019 3/11/2019 1/28/2019   Pain Disability Index (PDI) 14 13 29     Interval Update: 4/24/19 Pt returns today S/P BILATERAL Dorsal Ramus Block at L5 on 4/24/19 with 60-80% relief for 1-2 days and pain returned.       3/11/19 - Pt returns for follow up she is S/P BILATERAL Sacroiliac Joint Injection on 2/7/19 with 90 % relief for three days. Pt reports 6/10 low back and bilateral hip pain.  She is requesting a brace for the low back to help facilitate participation in ADLs that require extended walking.    1/29/19 - Pt returns for follow up she reports 8/10 low back  bilateral hip pain.  She reports return of her low back pain in the area of the SI joint 1-2 weeks ago.  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    9/7/18 - Pt returns to clinic reporting severely exacerbated low back pain bilaterally following the last session of physical therapy.  She endorses sharp, achy, stabbing pain in the areas of the SI joints and would like to repeat the SI injection she received a few weeks ago.  She reported 100% relief of lbp following the SI injection that last up until a few days ago when she experienced the exacerbation.  Overall, her PDI is improved which shows progress.    Background:  Bhavna Landry is a 63 y.o. female referred for right sided back pain to right upper buttocks.  She points to the middle of the right buttock 1-2 inches to the right of midline.    Location: back  Severity: Currently: 7/10   Typical Range: 7/10     Exacerbation: 7/10   Onset: 3-4 months  Quality: Burning and constant  Radiation: none  Axial/Extremity Percentage of Pain: 100%  Exacerbating Factors: prolonged sitting,  standing, rolling over in bed  Mitigating Factors: aleve  Assoc: denies night fever/night sweats, urinary incontinence, bowel incontinence, significant weight loss, significant motor weakness and loss of sensations    Previous Therapies:  PT:  Scheduled to start this coming Friday 8/17/18  HEP:  damaris  TENS: none  Injections: Low Back 10-15 yrs ago, with relief - unsure of which injection she had  Surgery:    Right TKA and revision due to nickel allergy  Medications:   - NSAIDS: Yes  - MSK Relaxants: Yes, tizanidine  - TCAs:   - SNRIs: Yes  - Topicals:   - Anticonvulsants:  - Opioids: Yes    Current Pain Medications:  1. Aleve   2. Tramadol  3. Tylenol  4. Effexor    Full Medication List:    Current Outpatient Medications:     albuterol (PROVENTIL/VENTOLIN HFA) 90 mcg/actuation inhaler, Inhale 2 puffs into the lungs every 6 (six) hours as needed for Wheezing., Disp: 1 each, Rfl: 11    albuterol (VENTOLIN HFA) 90 mcg/actuation inhaler, Inhale 2 puffs into the lungs every 6 (six) hours as needed for Wheezing. Rescue, Disp: 1 each, Rfl: 11    aspirin 325 MG tablet, Take 325 mg by mouth once daily., Disp: , Rfl:     atorvastatin (LIPITOR) 40 MG tablet, TAKE 1 TABLET(40 MG) BY MOUTH EVERY DAY, Disp: 90 tablet, Rfl: 0    baclofen (LIORESAL) 20 MG tablet, Take 1 tablet (20 mg total) by mouth 3 (three) times daily., Disp: 90 tablet, Rfl: 11    cephALEXin (KEFLEX) 500 MG capsule, Take 1 capsule (500 mg total) by mouth 4 (four) times daily. for 7 days, Disp: 28 capsule, Rfl: 0    diclofenac sodium (VOLTAREN) 1 % Gel, Apply 2 g topically once daily., Disp: 1 Tube, Rfl: 3    hydroCHLOROthiazide (HYDRODIURIL) 25 MG tablet, Take 1 tablet (25 mg total) by mouth once daily., Disp: 30 tablet, Rfl: 5    lisinopril 10 MG tablet, Take 10 mg by mouth once daily., Disp: , Rfl:     mupirocin (BACTROBAN) 2 % ointment, Apply to affected area 3 times daily, Disp: 22 g, Rfl: 0    risperiDONE (RISPERDAL) 4 MG tablet, Take 1 tablet  (4 mg total) by mouth 2 (two) times daily., Disp: 60 tablet, Rfl: 11    secukinumab (COSENTYX PEN) 150 mg/mL PnIj, Inject 150 mg into the skin every 4 weeks, Disp: 1 mL, Rfl: 0    sulfaSALAzine (AZULFIDINE) 500 MG TbEC, TAKE 1 TABLET BY MOUTH TWICE DAILY FOR 1 WEEK, THEN 2 TABLETS TWICE DAILY FOR 1 WEEK, THEN 3 TABLETS BY MOUTH TWICE DAILY, Disp: 225 tablet, Rfl: 1    sulfaSALAzine (AZULFIDINE) 500 MG TbEC, TAKE 3 TABLETS(1500 MG) BY MOUTH TWICE DAILY, Disp: 540 tablet, Rfl: 0    tobramycin-dexamethasone 0.3-0.1% (TOBRADEX) 0.3-0.1 % Oint, Place a thin ribbon of ointment to suture line 3 times daily., Disp: 3.5 g, Rfl: 0    traMADol (ULTRAM) 50 mg tablet, TAKE 1 TABLET BY MOUTH EVERY 12 HOURS AS NEEDED FOR PAIN, Disp: 60 tablet, Rfl: 0    venlafaxine (EFFEXOR-XR) 150 MG Cp24, Take 1 capsule (150 mg total) by mouth once daily., Disp: 30 capsule, Rfl: 11    amLODIPine (NORVASC) 5 MG tablet, Take 1 tablet (5 mg total) by mouth once daily., Disp: 30 tablet, Rfl: 0    gabapentin (NEURONTIN) 300 MG capsule, Take 1 capsule (300 mg total) by mouth every evening., Disp: 60 capsule, Rfl: 5     Review of Systems:  Review of Systems   Constitutional: Negative for chills and fever.   HENT: Negative for nosebleeds.    Eyes: Negative for pain.   Respiratory: Negative for hemoptysis.    Cardiovascular: Negative for chest pain.   Gastrointestinal: Negative for nausea and vomiting.   Genitourinary: Negative for dysuria.   Musculoskeletal: Positive for back pain and joint pain. Negative for falls.   Skin: Negative for rash.   Neurological: Negative for tingling and focal weakness.   Endo/Heme/Allergies: Does not bruise/bleed easily.   Psychiatric/Behavioral: Negative for depression. The patient is not nervous/anxious.        Allergies:  Chloramphenicol sod succinate; Etanercept; Nickel sutures [surgical stainless steel]; and Adhesive     Medical History:  Past Medical History:   Diagnosis Date    Allergy     Amblyopia      Anemia     Arthritis 1992    Cataract     Depression     Dry eyes     Dry mouth     Fibromyalgia 2014    Fibromyalgia     GERD (gastroesophageal reflux disease)     History of psychiatric hospitalization     Hyperlipidemia 1992    Hypertension     Kidney stone     Migraine headache     Osteoporosis     Psoriatic arthritis 1992    Right knee pain     post knee replacement surgery (possible rejectiion of metal)    RLS (restless legs syndrome)     Schizophrenia 1992    stable on meds    Urinary tract infection         Surgical History:  Past Surgical History:   Procedure Laterality Date    ARTHROPLASTY, KNEE, TOTAL Right 2013    Performed by Philipp [facility]um [facility] at [facility] OR 2ND FLR    Bilateral Sacroiliac Joint Injection - Per Dr Pineda, not necessary to hold ASA. Bilateral 2019    Performed by Michelle [facility]a Jr., [facility] at [facility] PAIN MGT    BLOCK, SACROILIAC JOINT-BILATERAL Bilateral 2018    Performed by Michelle [facility]a Jr., MD at [facility] PAIN MGT    BLOCK, SACROILIAC JOINT-Right- ORAL SEDATION Right 2018    Performed by Michelle [facility]a Jr., MD at [facility] PAIN MGT    brow ptosis repair Right 2019    Surgeon: Dr. Karla Henson     SECTION      COLONOSCOPY N/A 2016    Performed by ANDRIA Connelly MD at [facility] ENDO (4TH FLR)    COLONOSCOPY N/A 2013    Performed by Didier Poole MD at [facility] ENDO (4TH FLR)    cyst removed from right sinus  1982    GRAFT-FEMORAL AUTOLOGOUS BONE GRAFTING Right 2014    Performed by [facility] at [facility] OR    HYSTERECTOMY      VAGINAL HYSTERECTOMY WITHOUT BSO - ENDOMETRIOSIS    JOINT REPLACEMENT Right     knee    KNEE SURGERY      Lumbo-sacral Block, DR5 and Lateral Branches of S1,S2, S3 N/A 2019    Performed by Michelle [facility]a Jr., MD at [facility] PAIN MGT    REVISION-ARTHROPLASTY-KNEE-TOTAL Right 2014    Performed by Stone [facility] at [facility] OR    Surgery  on right knee  1982    tumor removed from back left side upper shoulder  2006        Social History:  Social History     Socioeconomic History    Marital status:      Spouse name: Not on file    Number of children: 1    Years of education: Not on file    Highest education level: Not on file   Occupational History    Occupation: retired   [address].     Employer: 2010   Social Needs    Financial resource strain: Not on file    Food insecurity:     Worry: Not on file     Inability: Not on file    Transportation needs:     Medical: Not on file     Non-medical: Not on file   Tobacco Use    Smoking status: Former Smoker     Packs/day: 0.50     Years: 10.00     Pack years: 5.00     Types: Cigarettes     Last attempt to quit: 2012     Years since quittin.9    Smokeless tobacco: Former User    Tobacco comment: stopped smoking .     Substance and Sexual Activity    Alcohol use: No    Drug use: No    Sexual activity: Never     Partners: Male     Birth control/protection: Post-menopausal   Lifestyle    Physical activity:     Days per week: Not on file     Minutes per session: Not on file    Stress: Not on file   Relationships    Social connections:     Talks on phone: Not on file     Gets together: Not on file     Attends Orthodox service: Not on file     Active member of club or organization: Not on file     Attends meetings of clubs or organizations: Not on file     Relationship status: Not on file   Other Topics Concern    Patient feels they ought to cut down on drinking/drug use Not Asked    Patient annoyed by others criticizing their drinking/drug use Not Asked    Patient has felt bad or guilty about drinking/drug use Not Asked    Patient has had a drink/used drugs as an eye opener in the AM Not Asked    Are you pregnant or think you may be? Not Asked    Breast-feeding Not Asked   Social History Narrative    Exercise:  Childcare.        Ett:   3 days a week       Physical Exam:  Vitals:    19 1036   BP: 122/84   Pulse: 70   Weight: 48 kg (105 lb 13.1 oz)   PainSc:   9     General    Nursing note and vitals reviewed.  Constitutional: She is oriented to person, place, and time. She appears well-developed and well-nourished. No distress.   HENT:   Head: Normocephalic and atraumatic.   Nose: Nose normal.   Eyes: Conjunctivae and EOM are normal. Pupils are equal, round, and reactive to light. Right eye exhibits no discharge. Left eye exhibits no discharge. No scleral icterus.   Neck: No JVD present.   Cardiovascular: Intact distal pulses.    Pulmonary/Chest: Effort normal. No respiratory distress.   Abdominal: She exhibits no distension.   Neurological: She is alert and oriented to person, place, and time. Coordination normal.   Psychiatric: She has a normal mood and affect. Her behavior is normal. Judgment and thought content normal.     General Musculoskeletal Exam   Gait: antalgic         Right Hip Exam   Right hip exam is normal.     Tenderness   The patient tender to palpation of the SI joint.    Tests   Pain w/ forced internal rotation (HELIO): present  Back (L-Spine & T-Spine) / Neck (C-Spine) Exam     Tenderness Right paramedian tenderness of the Sacrum.         Imagin18 - X-Ray Lumbar Spine Ap And Lateral   Narrative    EXAMINATION:  XR LUMBAR SPINE AP AND LATERAL    CLINICAL HISTORY:  Low back pain, >6wks conservative tx, persistent-progressive sx, surgical candidate;pain r low back;Dorsalgia, unspecified    TECHNIQUE:  AP, lateral and spot images were performed of the lumbar spine.    COMPARISON:  None    FINDINGS:  DJD and lumbar scoliosis.  The L1/L2 and the L2/L3 disc spaces are significantly narrowed.  No fracture, spondylolisthesis or bone destruction identified        Labs:  BMP  Lab Results   Component Value Date     (L) 2019    K 4.3 2019     2019    CO2 28 2019    BUN 9 2019     CREATININE 1.0 04/01/2019    CALCIUM 9.7 04/01/2019    ANIONGAP 7 (L) 04/01/2019    ESTGFRAFRICA >60.0 04/01/2019    EGFRNONAA 59.3 (A) 04/01/2019     Lab Results   Component Value Date    ALT 15 02/08/2019    AST 20 02/08/2019    GGT 91 (H) 07/28/2014    ALKPHOS 74 02/08/2019    BILITOT 0.2 02/08/2019       Assessment:  Problem List Items Addressed This Visit        Neuro    Sacroiliac joint dysfunction - Primary    Chronic pain       Orthopedic    Low back pain    Myofascial pain syndrome      Other Visit Diagnoses     Bilateral sacroiliitis              This is a 63-year-old female with chronic low back pain the past 4 months.  The pain is nonradiating and exacerbated by prolonged sitting, standing, rolling over in bed.  Her physical exam demonstrates a positive HELIO sign on the right.  The history and exam are consistent with right sacroiliac joint dysfunction.  The patient states pain limits her ability to accomplish some activities of daily living and I have recommended a right sacroiliac joint injection for diagnostic and therapeutic purposes.  This injection will also facilitate her participation in physical therapy which is scheduled to start this coming Friday, August 17th.    1/28/19 - Previous SI injection lasted 5 months and patient would like to repeat the injection, which is appropriate for helping to restore function.    3/11/19 - sacroiliac joint injection provided excellent diagnostic value in that confirmed the location of her pain with 90% relief reported for 3 days.  She did not receive prolonged relief from the injection and additional SI injections are not indicated.  I spent time discussing with her additional treatment options including the Coolief procedure, which will require diagnostic blocks prior to it.  Should that fail to provide her at least 6 months of relief, she may be a candidate for SI fusion.    4/24/2019- 89 y/o female s/p bilateral  Dorsal ramus block(DR5, S1,S2,S3) with  reported  60-80% relief for 1-2 days and pain returned, she presented pain diary which reflected her relief. Dicussed that I recommend a Right then Left DR 5, Lateral branches of  S1, S2,S3 RFA for sustained relief.     Treatment Plan:   DME: pt received support brace.   PT/OT/HEP: I requested that she return to PT after the injection even though she associates it with increasing her pain.  Procedures:  S/F  DR5 + Lateral Branches of S1, S2, and S3 RFA right then Left.  Medications:    - Recommended she continue  to maximize her use of heat/ice, tylenol, and rest.  Imaging: No additional imaging recommended at this time.  Labs: Reviewed.  Medications are appropriately dosed for current hepatorenal function.    Follow Up: RTC p kumar Brar, NP-C  Interventional Pain Management        Disclaimer: This note was partly generated using dictation software which may occasionally result in transcription errors.

## 2019-04-24 NOTE — TELEPHONE ENCOUNTER
----- Message from Chika Mcallister MA sent at 4/24/2019  1:56 PM CDT -----  Pt is scheduled to have a right and left Dorsal and Lateral Branch RFA with Dr. Pineda scheduled for 5/7/19 and 5/14/19 and pt needs med clearance to hold  mg 5 days prior to each procedure. Please advise.

## 2019-04-25 ENCOUNTER — OFFICE VISIT (OUTPATIENT)
Dept: OPHTHALMOLOGY | Facility: CLINIC | Age: 64
End: 2019-04-25
Payer: MEDICARE

## 2019-04-25 ENCOUNTER — TELEPHONE (OUTPATIENT)
Dept: INTERNAL MEDICINE | Facility: CLINIC | Age: 64
End: 2019-04-25

## 2019-04-25 DIAGNOSIS — Z98.890 POST-OPERATIVE STATE: Primary | ICD-10-CM

## 2019-04-25 DIAGNOSIS — H50.811 DUANE'S SYNDROME OF RIGHT EYE: ICD-10-CM

## 2019-04-25 DIAGNOSIS — H33.311 RETINAL TEAR OF RIGHT EYE: ICD-10-CM

## 2019-04-25 DIAGNOSIS — H57.811 BROW PTOSIS, RIGHT: ICD-10-CM

## 2019-04-25 DIAGNOSIS — H35.62 RETINAL HEMORRHAGE, LEFT: ICD-10-CM

## 2019-04-25 PROCEDURE — 99999 PR PBB SHADOW E&M-EST. PATIENT-LVL II: CPT | Mod: PBBFAC,,, | Performed by: OPHTHALMOLOGY

## 2019-04-25 PROCEDURE — 99999 PR PBB SHADOW E&M-EST. PATIENT-LVL II: ICD-10-PCS | Mod: PBBFAC,,, | Performed by: OPHTHALMOLOGY

## 2019-04-25 PROCEDURE — 99024 POSTOP FOLLOW-UP VISIT: CPT | Mod: S$GLB,,, | Performed by: OPHTHALMOLOGY

## 2019-04-25 PROCEDURE — 99024 PR POST-OP FOLLOW-UP VISIT: ICD-10-PCS | Mod: S$GLB,,, | Performed by: OPHTHALMOLOGY

## 2019-04-25 PROCEDURE — 92285 EXTERNAL PHOTOGRAPHY - OU - BOTH EYES: ICD-10-PCS | Mod: S$GLB,,, | Performed by: OPHTHALMOLOGY

## 2019-04-25 PROCEDURE — 92285 EXTERNAL OCULAR PHOTOGRAPHY: CPT | Mod: S$GLB,,, | Performed by: OPHTHALMOLOGY

## 2019-04-25 RX ORDER — AMOXICILLIN 500 MG/1
TABLET, FILM COATED ORAL
Refills: 0 | COMMUNITY
Start: 2019-04-12 | End: 2019-05-31

## 2019-04-25 NOTE — PROGRESS NOTES
HPI     Post-op Evaluation      Additional comments: 1 mo              Comments     Patient here for 1 mo postop. Patient c/o floaters and states that there   may be one stitch left.    S/p cyst removed from right sinus (7/9/1982)    Brow ptosis OD  Retinal tear of OD  Retinal hemorrhage OS  Duane's syndrome OD  PVD OU  Migraine without aura and without status migrainosis  Hypertension  Tobacco use disorder          Last edited by Octavio Orozco on 4/25/2019  8:13 AM. (History)            Assessment /Plan     For exam results, see Encounter Report.    Post-operative state  -     External Photography - OU - Both Eyes    Brow ptosis, right    Retinal tear of right eye    Duane's syndrome of right eye    Retinal hemorrhage, left      Patient doing well! Post-operative instructions reviewed. All questions answered. Return in 4 weeks prn sooner any worsening of vision/symptoms or any concerns. Patient with mild residual right upper eyelid mechanical ptosis.  May need right upper eyelid blepharoplasty to completely relieve upper eyelid heaviness.    History of retinal tear and followed by Dr. Skelton.

## 2019-04-29 ENCOUNTER — TELEPHONE (OUTPATIENT)
Dept: PAIN MEDICINE | Facility: CLINIC | Age: 64
End: 2019-04-29

## 2019-04-29 NOTE — TELEPHONE ENCOUNTER
----- Message from Armando Fields sent at 4/29/2019  2:43 PM CDT -----  Contact: self/[phone]  Patient called to find out if her insurance has approved the procedures that Dr. Pineda wants to do on 05/07/19 and 05/14/19.    Please call to discuss today.

## 2019-04-29 NOTE — TELEPHONE ENCOUNTER
Called pt to advise that we do not show that Authorization has not been obtained as of yet for scheduled procedures.  Pt states that she will call back on 5/6/19 to see if a determination has been received.

## 2019-05-01 ENCOUNTER — TELEPHONE (OUTPATIENT)
Dept: PAIN MEDICINE | Facility: CLINIC | Age: 64
End: 2019-05-01

## 2019-05-03 ENCOUNTER — TELEPHONE (OUTPATIENT)
Dept: PAIN MEDICINE | Facility: CLINIC | Age: 64
End: 2019-05-03

## 2019-05-03 NOTE — TELEPHONE ENCOUNTER
----- Message from Charleen Conn sent at 5/3/2019  3:18 PM CDT -----  Contact: [phone]/SELF  Patient requesting to speak with you regarding her procedure scheduled for Tuesday.

## 2019-05-03 NOTE — TELEPHONE ENCOUNTER
Pt called to inquire if procedure scheduled 5/7/19 per Dr Pineda has been approved per her Ins. Co?  Advised pt that per our system, it is showing as approved.  Pt states that she was confirming as Ins Co told her it was approved.

## 2019-05-06 NOTE — DISCHARGE INSTRUCTIONS
Home Care Instructions Pain Management:    1.  DIET:    You may resume your normal diet today.    2.  BATHING:    You may shower with luke warm water.    3.  DRESSING:    You may remove your bandage today.    4.  ACTIVITY LEVEL:      You may resume your normal activities 24 hours after your procedure.    5.  MEDICATIONS:    You may resume your normal medications today.    6.  SPECIAL INSTRUCTIONS:    No heat to the injection site for 24 hours including bath or shower, heating pad, moist heat or hot tubs.    Use an ice pack to the injection site for any pain or discomfort.  Apply ice packs for 20 minute intervals as needed.    If you have received any sedatives by mouth today, you can not drive for 12 hours.    If you have received sedation through an IV, you can not drive for 24 hours.    PLEASE CALL YOUR DOCTOR FOR THE FOLLOWIN.  Redness or swelling around the injection site.  2.  Fever of 101 degrees.  3.  Drainage (pus) from the injection site.  4.  For any continuous bleeding (some dried blood over the incision is normal.)    FOR EMERGENCIES:    If any unusual problems or difficulties occur during clinic hours, call [phone] or dial [phone].    Follow up with with your physician in 2-3 weeks.

## 2019-05-07 ENCOUNTER — TELEPHONE (OUTPATIENT)
Dept: OPHTHALMOLOGY | Facility: CLINIC | Age: 64
End: 2019-05-07

## 2019-05-07 ENCOUNTER — HOSPITAL ENCOUNTER (OUTPATIENT)
Facility: HOSPITAL | Age: 64
Discharge: HOME | End: 2019-05-07
Attending: PAIN MEDICINE | Admitting: PAIN MEDICINE
Payer: MEDICARE

## 2019-05-07 VITALS
RESPIRATION RATE: 16 BRPM | OXYGEN SATURATION: 96 % | HEIGHT: 62 IN | TEMPERATURE: 98 F | DIASTOLIC BLOOD PRESSURE: 72 MMHG | SYSTOLIC BLOOD PRESSURE: 165 MMHG | HEART RATE: 81 BPM | BODY MASS INDEX: 19.32 KG/M2 | WEIGHT: 105 LBS

## 2019-05-07 DIAGNOSIS — G89.29 CHRONIC PAIN: ICD-10-CM

## 2019-05-07 DIAGNOSIS — M53.3 SACROILIAC JOINT DYSFUNCTION: Primary | ICD-10-CM

## 2019-05-07 PROCEDURE — 99152 MOD SED SAME PHYS/QHP 5/>YRS: CPT | Performed by: PAIN MEDICINE

## 2019-05-07 PROCEDURE — 64635 DESTROY LUMB/SAC FACET JNT: CPT | Performed by: PAIN MEDICINE

## 2019-05-07 PROCEDURE — 64636 DESTROY L/S FACET JNT ADDL: CPT | Performed by: PAIN MEDICINE

## 2019-05-07 PROCEDURE — 64636 DESTROY L/S FACET JNT ADDL: CPT | Mod: RT,,, | Performed by: PAIN MEDICINE

## 2019-05-07 PROCEDURE — A4649 SURGICAL SUPPLIES: HCPCS | Performed by: PAIN MEDICINE

## 2019-05-07 PROCEDURE — 64635 PR DESTROY LUMB/SAC FACET JNT: ICD-10-PCS | Mod: RT,,, | Performed by: PAIN MEDICINE

## 2019-05-07 PROCEDURE — 25000003 PHARM REV CODE 250: Performed by: PAIN MEDICINE

## 2019-05-07 PROCEDURE — 63600175 PHARM REV CODE 636 W HCPCS: Performed by: PAIN MEDICINE

## 2019-05-07 PROCEDURE — 64635 DESTROY LUMB/SAC FACET JNT: CPT | Mod: RT,,, | Performed by: PAIN MEDICINE

## 2019-05-07 PROCEDURE — 99152 PR MOD CONSCIOUS SEDATION, SAME PHYS, 5+ YRS, FIRST 15 MIN: ICD-10-PCS | Mod: ,,, | Performed by: PAIN MEDICINE

## 2019-05-07 PROCEDURE — 99152 MOD SED SAME PHYS/QHP 5/>YRS: CPT | Mod: ,,, | Performed by: PAIN MEDICINE

## 2019-05-07 PROCEDURE — 25500020 PHARM REV CODE 255: Performed by: PAIN MEDICINE

## 2019-05-07 PROCEDURE — 64636 PR DESTROY L/S FACET JNT ADDL: ICD-10-PCS | Mod: RT,,, | Performed by: PAIN MEDICINE

## 2019-05-07 RX ORDER — SODIUM CHLORIDE 9 MG/ML
500 INJECTION, SOLUTION INTRAVENOUS CONTINUOUS
Status: DISCONTINUED | OUTPATIENT
Start: 2019-05-07 | End: 2019-05-07 | Stop reason: HOSPADM

## 2019-05-07 RX ORDER — LIDOCAINE HYDROCHLORIDE 10 MG/ML
1 INJECTION, SOLUTION EPIDURAL; INFILTRATION; INTRACAUDAL; PERINEURAL ONCE
Status: COMPLETED | OUTPATIENT
Start: 2019-05-07 | End: 2019-05-07

## 2019-05-07 RX ORDER — METHYLPREDNISOLONE ACETATE 40 MG/ML
INJECTION, SUSPENSION INTRA-ARTICULAR; INTRALESIONAL; INTRAMUSCULAR; SOFT TISSUE
Status: DISCONTINUED | OUTPATIENT
Start: 2019-05-07 | End: 2019-05-07 | Stop reason: HOSPADM

## 2019-05-07 RX ORDER — LIDOCAINE HYDROCHLORIDE 20 MG/ML
INJECTION, SOLUTION EPIDURAL; INFILTRATION; INTRACAUDAL; PERINEURAL
Status: DISCONTINUED | OUTPATIENT
Start: 2019-05-07 | End: 2019-05-07 | Stop reason: HOSPADM

## 2019-05-07 RX ORDER — INDOMETHACIN 25 MG/1
CAPSULE ORAL
Status: DISCONTINUED | OUTPATIENT
Start: 2019-05-07 | End: 2019-05-07 | Stop reason: HOSPADM

## 2019-05-07 RX ORDER — FENTANYL CITRATE 50 UG/ML
INJECTION, SOLUTION INTRAMUSCULAR; INTRAVENOUS
Status: DISCONTINUED | OUTPATIENT
Start: 2019-05-07 | End: 2019-05-07 | Stop reason: HOSPADM

## 2019-05-07 RX ORDER — LIDOCAINE HYDROCHLORIDE 10 MG/ML
INJECTION, SOLUTION EPIDURAL; INFILTRATION; INTRACAUDAL; PERINEURAL
Status: DISCONTINUED | OUTPATIENT
Start: 2019-05-07 | End: 2019-05-07 | Stop reason: HOSPADM

## 2019-05-07 RX ORDER — MIDAZOLAM HYDROCHLORIDE 1 MG/ML
INJECTION, SOLUTION INTRAMUSCULAR; INTRAVENOUS
Status: DISCONTINUED | OUTPATIENT
Start: 2019-05-07 | End: 2019-05-07 | Stop reason: HOSPADM

## 2019-05-07 RX ADMIN — SODIUM CHLORIDE 500 ML: 0.9 INJECTION, SOLUTION INTRAVENOUS at 10:05

## 2019-05-07 NOTE — PLAN OF CARE
Dc instructions to pt. Voiced good understanding. To car via w/c with staff to meet family.pt table and voices no complaints

## 2019-05-07 NOTE — DISCHARGE SUMMARY
[facility]  Discharge Note  Short Stay     Admit Date: 5/7/2019    Discharge Date: 5/7/2019     Attending Physician: Michelle Pineda Jr, MD    Diagnoses:  Active Hospital Problems    Diagnosis  POA    *Sacroiliac joint dysfunction [M53.3]  Yes    Chronic pain [G89.29]  Yes      Resolved Hospital Problems   No resolved problems to display.     Discharged Condition: Good     Hospital Course: Patient was admitted for an outpatient interventional pain management procedure and tolerated the procedure well with no complications.     Final Diagnoses: Same as principal problem.     Disposition: Home or Self Care     Follow up/Patient Instructions:    Follow-up Information     Michelle Pineda Jr, MD. Go in 1 week.    Specialty:  Pain Medicine  Why:  Post-procedural Follow Up As Scheduled  Contact information:  [address]  [address]  [facility] [address] [phone]                   Reconciled Medications:     Medication List      CONTINUE taking these medications    * albuterol 90 mcg/actuation inhaler  Commonly known as:  PROVENTIL/VENTOLIN HFA  Inhale 2 puffs into the lungs every 6 (six) hours as needed for Wheezing.     * albuterol 90 mcg/actuation inhaler  Commonly known as:  VENTOLIN HFA  Inhale 2 puffs into the lungs every 6 (six) hours as needed for Wheezing. Rescue     amLODIPine 5 MG tablet  Commonly known as:  NORVASC  Take 1 tablet (5 mg total) by mouth once daily.     amoxicillin 500 MG Tab  Commonly known as:  AMOXIL  TK ONE T PO TID TAT     aspirin 325 MG tablet  Take 325 mg by mouth once daily.     atorvastatin 40 MG tablet  Commonly known as:  LIPITOR  TAKE 1 TABLET(40 MG) BY MOUTH EVERY DAY     baclofen 20 MG tablet  Commonly known as:  LIORESAL  Take 1 tablet (20 mg total) by mouth 3 (three) times daily.     diclofenac sodium 1 % Gel  Commonly known as:  VOLTAREN  Apply 2 g topically once daily.     gabapentin 300 MG capsule  Commonly known as:  NEURONTIN  Take 1 capsule (300 mg  total) by mouth every evening.     hydroCHLOROthiazide 25 MG tablet  Commonly known as:  HYDRODIURIL  Take 1 tablet (25 mg total) by mouth once daily.     lisinopril 10 MG tablet  Take 10 mg by mouth once daily.     mupirocin 2 % ointment  Commonly known as:  BACTROBAN  Apply to affected area 3 times daily     risperiDONE 4 MG tablet  Commonly known as:  RISPERDAL  Take 1 tablet (4 mg total) by mouth 2 (two) times daily.     secukinumab 150 mg/mL Pnij  Commonly known as:  COSENTYX PEN  Inject 150 mg into the skin every 4 weeks     * sulfaSALAzine 500 MG Tbec  Commonly known as:  AZULFIDINE  TAKE 1 TABLET BY MOUTH TWICE DAILY FOR 1 WEEK, THEN 2 TABLETS TWICE DAILY FOR 1 WEEK, THEN 3 TABLETS BY MOUTH TWICE DAILY     * sulfaSALAzine 500 MG Tbec  Commonly known as:  AZULFIDINE  TAKE 3 TABLETS(1500 MG) BY MOUTH TWICE DAILY     tobramycin-dexamethasone 0.3-0.1% 0.3-0.1 % Oint  Commonly known as:  TOBRADEX  Place a thin ribbon of ointment to suture line 3 times daily.     traMADol 50 mg tablet  Commonly known as:  ULTRAM  TAKE 1 TABLET BY MOUTH EVERY 12 HOURS AS NEEDED FOR PAIN     venlafaxine 150 MG Cp24  Commonly known as:  EFFEXOR-XR  Take 1 capsule (150 mg total) by mouth once daily.         * This list has 4 medication(s) that are the same as other medications prescribed for you. Read the directions carefully, and ask your doctor or other care provider to review them with you.               Discharge Procedure Orders (must include Diet, Follow-up, Activity)   Call MD for:  temperature >100.4     Call MD for:  severe uncontrolled pain     Call MD for:  redness, tenderness, or signs of infection (pain, swelling, redness, odor or green/yellow discharge around incision site)     Call MD for:  difficulty breathing or increased cough     Call MD for:  severe persistent headache     Call MD for:  worsening rash     Remove dressing in 24 hours       Michelle Pineda Jr, MD  Interventional Pain Medicine /  Anesthesiology

## 2019-05-07 NOTE — OP NOTE
Procedure Note    Preoperative Diagnosis: Chronic Low Back Pain & Sacroiliac Joint Dysfunction  Postoperative Diagnosis: Chronic Low Back Pain & Sacroiliac Joint Dysfunction  Procedure Date: 05/07/2019  Procedure Title:  (1) Radiofrequency Ablation of Right L5 Dorsal Ramus and Lateral Branches of S1, S2, and S3 (4 levels)     (2) Intraoperative Fluoroscopy     (3) IV Moderate Sedation (Midazolam 2 mg, Fentanyl 75 mcg)    Anesthesia: Local    Indications: Bhavna Landry is a 63 y.o. year-old female with chronic low back pain due to sacroiliac joint dysfunction.  The patient had significant relief of the pain with the previous diagnostic nerve blocks.     Findings: Final needle placement consistent with technically sucsessful procedure.     Procedure in Detail: The patients history and physical exam were reviewed.  Informed consent was verified and a time out was performed in the presence of the patient prior to administration of IV sedation. The patient agreed to proceed.    The patient was placed in a prone position and head resting comfortably in a head ring.  Cardiopulmonary monitoring was initiated and medications to achieve IV moderate sedation were administered.  The lumbosacral area was prepped and draped in sterile fashion.    AP and ipsilateral oblique fluoroscopic views were employed to visualize the sacral ala, the location of the L5 dorsal ramus.  Skin overlying the target point was anesthetized with 2-3 mL of lidocaine 1%.  A 14 gauge styletted cannula was advanced through the anesthetized skin to contact os at the sacral ala.  Needle placement was inspected and optimized in lateral fluoroscopic views.  Motor stimulation at 2 Hz & 2 V was negative for muscle contractions in the L5 nerve distribution except for local multifidus twitch.  One mL of lidocaine 2% was injected followed by radiofrequency ablation for 2 min 30 sec with a tissue temperature of 80° C.    The fluoroscope was adjusted to optimize  visualization of the dorsal sacral foramina and the corresponding arcuate lines.  A point approximately 5 mm lateral to the dorsal foramina of S1, S2, and S3 was marked on the skin.  A 14 gauge styletted cannula was advanced through the anesthetized skin to contact os at the targeted points.  Prior to lesioning, 1 mL of lidocaine 2% was injected. Radiofrequency lesions (as detailed above) were performed circumferentially at 3 locations for S1, 3 locations for S2, and 2 locations for S3.    A solution consisting of 2 mL 0.5% bupivacaine and 40 mg Depomedrol was divided between the locations and injected through each probe. The probes were removed with a 1% lidocaine flush.  The patients back was cleaned and bandages were placed at the needle insertion sites.    Estimated blood loss: None    Complications: None     Disposition:  The patient tolerated the procedure well and there were no apparent complications. Vital signs remained stable throughout the procedure. The patient was taken to the recovery area where written discharge instructions for the procedure were given.     Follow-up: RTC as scheduled for clinic eval or next procedure.    Michelle Pineda Jr, MD  Interventional Pain Medicine / Anesthesiology

## 2019-05-10 ENCOUNTER — TELEPHONE (OUTPATIENT)
Dept: PAIN MEDICINE | Facility: CLINIC | Age: 64
End: 2019-05-10

## 2019-05-10 NOTE — TELEPHONE ENCOUNTER
Called pt to advise that Auth for scheduled procedure is now showing as approved.  Also, pt reports that she has held her ASA with approval per Dr Vargas.

## 2019-05-10 NOTE — DISCHARGE INSTRUCTIONS
Home Care Instructions Pain Management:    1.  DIET:    You may resume your normal diet today.    2.  BATHING:    You may shower with luke warm water.    3.  DRESSING:    You may remove your bandage today.    4.  ACTIVITY LEVEL:      You may resume your normal activities 24 hours after your procedure.    5.  MEDICATIONS:    You may resume your normal medications today.    6.  SPECIAL INSTRUCTIONS:    No heat to the injection site for 24 hours including bath or shower, heating pad, moist heat or hot tubs.    Use an ice pack to the injection site for any pain or discomfort.  Apply ice packs for 20 minute intervals as needed.    If you have received any sedatives by mouth today, you can not drive for 12 hours.    If you have received sedation through an IV, you can not drive for 24 hours.    PLEASE CALL YOUR DOCTOR FOR THE FOLLOWIN.  Redness or swelling around the injection site.  2.  Fever of 101 degrees.  3.  Drainage (pus) from the injection site.  4.  For any continuous bleeding (some dried blood over the incision is normal.)    FOR EMERGENCIES:    If any unusual problems or difficulties occur during clinic hours, call [phone] or dial [phone].    Follow up with with your physician in 2-3 weeks.

## 2019-05-10 NOTE — TELEPHONE ENCOUNTER
Returned call to pt.  Pt states that she spoke to her Ins Co who advised her that her scheduled procedure on 5/14/19 has been approved.  Advised pt that I will contact the Auth Dept to advise them an update is needed as the case is still showing as pending.

## 2019-05-10 NOTE — TELEPHONE ENCOUNTER
----- Message from Kailee Gonzalez sent at 5/10/2019 11:35 AM CDT -----  Contact: [phone]  She is calling to make sure you received the approval from her insurance company for the surgery.

## 2019-05-14 ENCOUNTER — HOSPITAL ENCOUNTER (OUTPATIENT)
Facility: HOSPITAL | Age: 64
Discharge: HOME | End: 2019-05-14
Attending: PAIN MEDICINE | Admitting: PAIN MEDICINE
Payer: MEDICARE

## 2019-05-14 VITALS
BODY MASS INDEX: 19.32 KG/M2 | HEART RATE: 78 BPM | SYSTOLIC BLOOD PRESSURE: 167 MMHG | OXYGEN SATURATION: 99 % | RESPIRATION RATE: 16 BRPM | TEMPERATURE: 98 F | DIASTOLIC BLOOD PRESSURE: 76 MMHG | HEIGHT: 62 IN | WEIGHT: 105 LBS

## 2019-05-14 DIAGNOSIS — M53.3 SACROILIAC JOINT DYSFUNCTION: Primary | ICD-10-CM

## 2019-05-14 DIAGNOSIS — G89.29 CHRONIC PAIN: ICD-10-CM

## 2019-05-14 PROCEDURE — 64640 PR DESTRUCT BY NEURO AGENT; OTHER PERIPH NERVE: ICD-10-PCS | Mod: 59,LT,, | Performed by: PAIN MEDICINE

## 2019-05-14 PROCEDURE — 64636 DESTROY L/S FACET JNT ADDL: CPT | Performed by: PAIN MEDICINE

## 2019-05-14 PROCEDURE — 99152 MOD SED SAME PHYS/QHP 5/>YRS: CPT | Mod: ,,, | Performed by: PAIN MEDICINE

## 2019-05-14 PROCEDURE — 64635 DESTROY LUMB/SAC FACET JNT: CPT | Performed by: PAIN MEDICINE

## 2019-05-14 PROCEDURE — 64635 DESTROY LUMB/SAC FACET JNT: CPT | Mod: LT,,, | Performed by: PAIN MEDICINE

## 2019-05-14 PROCEDURE — 64635 PR DESTROY LUMB/SAC FACET JNT: ICD-10-PCS | Mod: LT,,, | Performed by: PAIN MEDICINE

## 2019-05-14 PROCEDURE — 25000003 PHARM REV CODE 250: Performed by: PAIN MEDICINE

## 2019-05-14 PROCEDURE — 99152 PR MOD CONSCIOUS SEDATION, SAME PHYS, 5+ YRS, FIRST 15 MIN: ICD-10-PCS | Mod: ,,, | Performed by: PAIN MEDICINE

## 2019-05-14 PROCEDURE — 64640 INJECTION TREATMENT OF NERVE: CPT | Mod: 59,LT,, | Performed by: PAIN MEDICINE

## 2019-05-14 PROCEDURE — 63600175 PHARM REV CODE 636 W HCPCS: Performed by: PAIN MEDICINE

## 2019-05-14 RX ORDER — BUPIVACAINE HYDROCHLORIDE 2.5 MG/ML
INJECTION, SOLUTION EPIDURAL; INFILTRATION; INTRACAUDAL
Status: DISCONTINUED | OUTPATIENT
Start: 2019-05-14 | End: 2019-05-14 | Stop reason: HOSPADM

## 2019-05-14 RX ORDER — LIDOCAINE HYDROCHLORIDE 10 MG/ML
1 INJECTION, SOLUTION EPIDURAL; INFILTRATION; INTRACAUDAL; PERINEURAL ONCE
Status: COMPLETED | OUTPATIENT
Start: 2019-05-14 | End: 2019-05-14

## 2019-05-14 RX ORDER — MIDAZOLAM HYDROCHLORIDE 1 MG/ML
INJECTION INTRAMUSCULAR; INTRAVENOUS
Status: DISCONTINUED | OUTPATIENT
Start: 2019-05-14 | End: 2019-05-14 | Stop reason: HOSPADM

## 2019-05-14 RX ORDER — SODIUM CHLORIDE 9 MG/ML
500 INJECTION, SOLUTION INTRAVENOUS CONTINUOUS
Status: DISCONTINUED | OUTPATIENT
Start: 2019-05-14 | End: 2019-05-14 | Stop reason: HOSPADM

## 2019-05-14 RX ORDER — LIDOCAINE HYDROCHLORIDE 20 MG/ML
INJECTION, SOLUTION EPIDURAL; INFILTRATION; INTRACAUDAL; PERINEURAL
Status: DISCONTINUED | OUTPATIENT
Start: 2019-05-14 | End: 2019-05-14 | Stop reason: HOSPADM

## 2019-05-14 RX ORDER — METHYLPREDNISOLONE ACETATE 80 MG/ML
INJECTION, SUSPENSION INTRA-ARTICULAR; INTRALESIONAL; INTRAMUSCULAR; SOFT TISSUE
Status: DISCONTINUED | OUTPATIENT
Start: 2019-05-14 | End: 2019-05-14 | Stop reason: HOSPADM

## 2019-05-14 RX ORDER — FENTANYL CITRATE 50 UG/ML
INJECTION, SOLUTION INTRAMUSCULAR; INTRAVENOUS
Status: DISCONTINUED | OUTPATIENT
Start: 2019-05-14 | End: 2019-05-14 | Stop reason: HOSPADM

## 2019-05-14 NOTE — OP NOTE
Procedure Note    Preoperative Diagnosis: Chronic Low Back Pain & Sacroiliac Joint Dysfunction  Postoperative Diagnosis: Chronic Low Back Pain & Sacroiliac Joint Dysfunction  Procedure Date: 05/15/2019  Procedure Title:  (1) Radiofrequency Ablation of Left L5 Dorsal Ramus and Lateral Branches of S1, S2, and S3 (4 levels)     (2) Intraoperative Fluoroscopy     (3) IV Moderate Sedation (Midazolam 2 mg, Fentanyl 50 mcg)    Anesthesia: Local    Indications: Bhavna Landry is a 63 y.o. year-old female with chronic low back pain due to sacroiliac joint dysfunction.  The patient had significant relief of the pain with the previous diagnostic nerve blocks.     Findings: Final needle placement consistent with technically sucsessful procedure.     Procedure in Detail: The patients history and physical exam were reviewed.  Informed consent was verified and a time out was performed in the presence of the patient prior to administration of IV sedation. The patient agreed to proceed.    The patient was placed in a prone position and head resting comfortably in a head ring.  Cardiopulmonary monitoring was initiated and medications to achieve IV moderate sedation were administered.  The lumbosacral area was prepped and draped in sterile fashion.    AP and ipsilateral oblique fluoroscopic views were employed to visualize the sacral ala, the location of the L5 dorsal ramus.  Skin overlying the target point was anesthetized with 2-3 mL of lidocaine 1%.  A 14 gauge styletted cannula was advanced through the anesthetized skin to contact os at the sacral ala.  Needle placement was inspected and optimized in lateral fluoroscopic views.  Motor stimulation at 2 Hz & 2 V was negative for muscle contractions in the L5 nerve distribution except for local multifidus twitch.  One mL of lidocaine 2% was injected followed by radiofrequency ablation for 2 min 30 sec with a tissue temperature of 80° C.    The fluoroscope was adjusted to optimize  visualization of the dorsal sacral foramina and the corresponding arcuate lines.  A point approximately 5 mm lateral to the dorsal foramina of S1, S2, and S3 was marked on the skin.  A 14 gauge styletted cannula was advanced through the anesthetized skin to contact os at the targeted points.  Prior to lesioning, 1 mL of lidocaine 2% was injected. Radiofrequency lesions (as detailed above) were performed circumferentially at 3 locations for S1, 3 locations for S2, and 2 locations for S3.    A solution consisting of 2 mL 0.5% bupivacaine and 40 mg Depomedrol was divided between the locations and injected through each probe. The probes were removed with a 1% lidocaine flush.  The patients back was cleaned and bandages were placed at the needle insertion sites.    Estimated blood loss: None    Complications: None     Disposition:  The patient tolerated the procedure well and there were no apparent complications. Vital signs remained stable throughout the procedure. The patient was taken to the recovery area where written discharge instructions for the procedure were given.     Follow-up: RTC as scheduled for clinic eval or next procedure.    Michelle Pineda Jr, MD  Interventional Pain Medicine / Anesthesiology

## 2019-05-14 NOTE — DISCHARGE SUMMARY
[facility]  Discharge Note  Short Stay     Admit Date: 5/14/2019    Discharge Date: 5/14/2019     Attending Physician: Michelle Pineda Jr, MD    Diagnoses:  Active Hospital Problems    Diagnosis  POA    *Sacroiliac joint dysfunction [M53.3]  Yes    Chronic pain [G89.29]  Yes      Resolved Hospital Problems   No resolved problems to display.     Discharged Condition: Good     Hospital Course: Patient was admitted for an outpatient interventional pain management procedure and tolerated the procedure well with no complications.     Final Diagnoses: Same as principal problem.     Disposition: Home or Self Care     Follow up/Patient Instructions:    Follow-up Information     Michelle Pineda Jr, MD. Go in 2 weeks.    Specialty:  Pain Medicine  Why:  Post-procedural Follow Up As Scheduled  Contact information:  [address]  [address]  [facility] [address] [phone]                   Reconciled Medications:     Medication List      CONTINUE taking these medications    * albuterol 90 mcg/actuation inhaler  Commonly known as:  PROVENTIL/VENTOLIN HFA  Inhale 2 puffs into the lungs every 6 (six) hours as needed for Wheezing.     * albuterol 90 mcg/actuation inhaler  Commonly known as:  VENTOLIN HFA  Inhale 2 puffs into the lungs every 6 (six) hours as needed for Wheezing. Rescue     amLODIPine 5 MG tablet  Commonly known as:  NORVASC  Take 1 tablet (5 mg total) by mouth once daily.     amoxicillin 500 MG Tab  Commonly known as:  AMOXIL  TK ONE T PO TID TAT     aspirin 325 MG tablet  Take 325 mg by mouth once daily.     atorvastatin 40 MG tablet  Commonly known as:  LIPITOR  TAKE 1 TABLET(40 MG) BY MOUTH EVERY DAY     baclofen 20 MG tablet  Commonly known as:  LIORESAL  Take 1 tablet (20 mg total) by mouth 3 (three) times daily.     diclofenac sodium 1 % Gel  Commonly known as:  VOLTAREN  Apply 2 g topically once daily.     gabapentin 300 MG capsule  Commonly known as:  NEURONTIN  Take 1 capsule (300 mg  total) by mouth every evening.     hydroCHLOROthiazide 25 MG tablet  Commonly known as:  HYDRODIURIL  Take 1 tablet (25 mg total) by mouth once daily.     lisinopril 10 MG tablet  Take 10 mg by mouth once daily.     mupirocin 2 % ointment  Commonly known as:  BACTROBAN  Apply to affected area 3 times daily     risperiDONE 4 MG tablet  Commonly known as:  RISPERDAL  Take 1 tablet (4 mg total) by mouth 2 (two) times daily.     secukinumab 150 mg/mL Pnij  Commonly known as:  COSENTYX PEN  Inject 150 mg into the skin every 4 weeks     * sulfaSALAzine 500 MG Tbec  Commonly known as:  AZULFIDINE  TAKE 1 TABLET BY MOUTH TWICE DAILY FOR 1 WEEK, THEN 2 TABLETS TWICE DAILY FOR 1 WEEK, THEN 3 TABLETS BY MOUTH TWICE DAILY     * sulfaSALAzine 500 MG Tbec  Commonly known as:  AZULFIDINE  TAKE 3 TABLETS(1500 MG) BY MOUTH TWICE DAILY     tobramycin-dexamethasone 0.3-0.1% 0.3-0.1 % Oint  Commonly known as:  TOBRADEX  Place a thin ribbon of ointment to suture line 3 times daily.     traMADol 50 mg tablet  Commonly known as:  ULTRAM  TAKE 1 TABLET BY MOUTH EVERY 12 HOURS AS NEEDED FOR PAIN     venlafaxine 150 MG Cp24  Commonly known as:  EFFEXOR-XR  Take 1 capsule (150 mg total) by mouth once daily.         * This list has 4 medication(s) that are the same as other medications prescribed for you. Read the directions carefully, and ask your doctor or other care provider to review them with you.               Discharge Procedure Orders (must include Diet, Follow-up, Activity)   Call MD for:  temperature >100.4     Call MD for:  severe uncontrolled pain     Call MD for:  redness, tenderness, or signs of infection (pain, swelling, redness, odor or green/yellow discharge around incision site)     Call MD for:  difficulty breathing or increased cough     Call MD for:  severe persistent headache     Call MD for:  worsening rash     Remove dressing in 24 hours       Michelle Pineda Jr, MD  Interventional Pain Medicine /  Anesthesiology

## 2019-05-16 DIAGNOSIS — E78.5 HYPERLIPIDEMIA, UNSPECIFIED HYPERLIPIDEMIA TYPE: ICD-10-CM

## 2019-05-16 RX ORDER — ATORVASTATIN CALCIUM 40 MG/1
TABLET, FILM COATED ORAL
Qty: 90 TABLET | Refills: 3 | Status: SHIPPED | OUTPATIENT
Start: 2019-05-16 | End: 2020-01-12 | Stop reason: SDUPTHER

## 2019-05-20 DIAGNOSIS — L40.50 PSORIATIC ARTHRITIS: Primary | ICD-10-CM

## 2019-05-21 ENCOUNTER — LAB VISIT (OUTPATIENT)
Dept: LAB | Facility: HOSPITAL | Age: 64
End: 2019-05-21
Attending: INTERNAL MEDICINE
Payer: MEDICARE

## 2019-05-21 DIAGNOSIS — L40.50 PSORIATIC ARTHRITIS: ICD-10-CM

## 2019-05-21 LAB
ALBUMIN SERPL BCP-MCNC: 3.7 G/DL (ref 3.5–5.2)
ALP SERPL-CCNC: 65 U/L (ref 55–135)
ALT SERPL W/O P-5'-P-CCNC: 14 U/L (ref 10–44)
ANION GAP SERPL CALC-SCNC: 7 MMOL/L (ref 8–16)
AST SERPL-CCNC: 23 U/L (ref 10–40)
BASOPHILS # BLD AUTO: 0.06 K/UL (ref 0–0.2)
BASOPHILS NFR BLD: 0.7 % (ref 0–1.9)
BILIRUB SERPL-MCNC: 0.3 MG/DL (ref 0.1–1)
BUN SERPL-MCNC: 7 MG/DL (ref 8–23)
CALCIUM SERPL-MCNC: 10 MG/DL (ref 8.7–10.5)
CHLORIDE SERPL-SCNC: 99 MMOL/L (ref 95–110)
CO2 SERPL-SCNC: 30 MMOL/L (ref 23–29)
CREAT SERPL-MCNC: 0.8 MG/DL (ref 0.5–1.4)
DIFFERENTIAL METHOD: ABNORMAL
EOSINOPHIL # BLD AUTO: 0.2 K/UL (ref 0–0.5)
EOSINOPHIL NFR BLD: 2.4 % (ref 0–8)
ERYTHROCYTE [DISTWIDTH] IN BLOOD BY AUTOMATED COUNT: 13.1 % (ref 11.5–14.5)
EST. GFR  (AFRICAN AMERICAN): >60 ML/MIN/1.73 M^2
EST. GFR  (NON AFRICAN AMERICAN): >60 ML/MIN/1.73 M^2
GLUCOSE SERPL-MCNC: 80 MG/DL (ref 70–110)
HCT VFR BLD AUTO: 37.5 % (ref 37–48.5)
HGB BLD-MCNC: 12 G/DL (ref 12–16)
IMM GRANULOCYTES # BLD AUTO: 0.02 K/UL (ref 0–0.04)
IMM GRANULOCYTES NFR BLD AUTO: 0.2 % (ref 0–0.5)
LYMPHOCYTES # BLD AUTO: 2.5 K/UL (ref 1–4.8)
LYMPHOCYTES NFR BLD: 30.9 % (ref 18–48)
MCH RBC QN AUTO: 32.1 PG (ref 27–31)
MCHC RBC AUTO-ENTMCNC: 32 G/DL (ref 32–36)
MCV RBC AUTO: 100 FL (ref 82–98)
MONOCYTES # BLD AUTO: 0.5 K/UL (ref 0.3–1)
MONOCYTES NFR BLD: 6.6 % (ref 4–15)
NEUTROPHILS # BLD AUTO: 4.9 K/UL (ref 1.8–7.7)
NEUTROPHILS NFR BLD: 59.2 % (ref 38–73)
NRBC BLD-RTO: 0 /100 WBC
PLATELET # BLD AUTO: 314 K/UL (ref 150–350)
PMV BLD AUTO: 8.8 FL (ref 9.2–12.9)
POTASSIUM SERPL-SCNC: 4.1 MMOL/L (ref 3.5–5.1)
PROT SERPL-MCNC: 6.2 G/DL (ref 6–8.4)
RBC # BLD AUTO: 3.74 M/UL (ref 4–5.4)
SODIUM SERPL-SCNC: 136 MMOL/L (ref 136–145)
WBC # BLD AUTO: 8.2 K/UL (ref 3.9–12.7)

## 2019-05-21 PROCEDURE — 36415 COLL VENOUS BLD VENIPUNCTURE: CPT | Mod: PO

## 2019-05-21 PROCEDURE — 85025 COMPLETE CBC W/AUTO DIFF WBC: CPT

## 2019-05-21 PROCEDURE — 80053 COMPREHEN METABOLIC PANEL: CPT

## 2019-05-23 RX ORDER — TRAMADOL HYDROCHLORIDE 50 MG/1
TABLET ORAL
Qty: 60 TABLET | Refills: 0 | Status: SHIPPED | OUTPATIENT
Start: 2019-05-23 | End: 2019-06-24 | Stop reason: SDUPTHER

## 2019-05-27 ENCOUNTER — TELEPHONE (OUTPATIENT)
Dept: INTERNAL MEDICINE | Facility: CLINIC | Age: 64
End: 2019-05-27

## 2019-05-27 DIAGNOSIS — M25.579 ANKLE PAIN, UNSPECIFIED CHRONICITY, UNSPECIFIED LATERALITY: Primary | ICD-10-CM

## 2019-05-27 DIAGNOSIS — M25.569 KNEE PAIN, UNSPECIFIED CHRONICITY, UNSPECIFIED LATERALITY: ICD-10-CM

## 2019-05-27 NOTE — TELEPHONE ENCOUNTER
----- Message from Neisha Martinez sent at 5/27/2019  3:11 PM CDT -----  Contact: self/[phone]  Pt called in regards to falling at [facility] and told them to call you and cancel the appointment. I would like for you to look at the x ray that I took on this morning at [facility]. She would like a call once the dr has looked at the results.    Please advise

## 2019-05-27 NOTE — TELEPHONE ENCOUNTER
Spoke with pt advised of normal results. Pot would like to know what she can do for the pain. She says she has been icing it, she tried tylenol but it did not help. Pt also says her tramadol isn't helping either. Please advise

## 2019-05-30 ENCOUNTER — OFFICE VISIT (OUTPATIENT)
Dept: URGENT CARE | Facility: CLINIC | Age: 64
End: 2019-05-30
Payer: MEDICARE

## 2019-05-30 VITALS
DIASTOLIC BLOOD PRESSURE: 62 MMHG | HEIGHT: 63 IN | BODY MASS INDEX: 18.61 KG/M2 | SYSTOLIC BLOOD PRESSURE: 106 MMHG | HEART RATE: 81 BPM | RESPIRATION RATE: 18 BRPM | OXYGEN SATURATION: 96 % | TEMPERATURE: 97 F | WEIGHT: 105 LBS

## 2019-05-30 DIAGNOSIS — S61.213A LACERATION OF LEFT MIDDLE FINGER WITHOUT FOREIGN BODY WITHOUT DAMAGE TO NAIL, INITIAL ENCOUNTER: Primary | ICD-10-CM

## 2019-05-30 PROCEDURE — 3008F BODY MASS INDEX DOCD: CPT | Mod: CPTII,S$GLB,, | Performed by: NURSE PRACTITIONER

## 2019-05-30 PROCEDURE — 99214 PR OFFICE/OUTPT VISIT, EST, LEVL IV, 30-39 MIN: ICD-10-PCS | Mod: 25,S$GLB,, | Performed by: NURSE PRACTITIONER

## 2019-05-30 PROCEDURE — 3074F SYST BP LT 130 MM HG: CPT | Mod: CPTII,S$GLB,, | Performed by: NURSE PRACTITIONER

## 2019-05-30 PROCEDURE — 3008F PR BODY MASS INDEX (BMI) DOCUMENTED: ICD-10-PCS | Mod: CPTII,S$GLB,, | Performed by: NURSE PRACTITIONER

## 2019-05-30 PROCEDURE — 3078F DIAST BP <80 MM HG: CPT | Mod: CPTII,S$GLB,, | Performed by: NURSE PRACTITIONER

## 2019-05-30 PROCEDURE — 99214 OFFICE O/P EST MOD 30 MIN: CPT | Mod: 25,S$GLB,, | Performed by: NURSE PRACTITIONER

## 2019-05-30 PROCEDURE — 3078F PR MOST RECENT DIASTOLIC BLOOD PRESSURE < 80 MM HG: ICD-10-PCS | Mod: CPTII,S$GLB,, | Performed by: NURSE PRACTITIONER

## 2019-05-30 PROCEDURE — 12002 RPR S/N/AX/GEN/TRNK2.6-7.5CM: CPT | Mod: S$GLB,,, | Performed by: NURSE PRACTITIONER

## 2019-05-30 PROCEDURE — 12002 LACERATION REPAIR: ICD-10-PCS | Mod: S$GLB,,, | Performed by: NURSE PRACTITIONER

## 2019-05-30 PROCEDURE — 1101F PR PT FALLS ASSESS DOC 0-1 FALLS W/OUT INJ PAST YR: ICD-10-PCS | Mod: CPTII,S$GLB,, | Performed by: NURSE PRACTITIONER

## 2019-05-30 PROCEDURE — 1101F PT FALLS ASSESS-DOCD LE1/YR: CPT | Mod: CPTII,S$GLB,, | Performed by: NURSE PRACTITIONER

## 2019-05-30 PROCEDURE — 3074F PR MOST RECENT SYSTOLIC BLOOD PRESSURE < 130 MM HG: ICD-10-PCS | Mod: CPTII,S$GLB,, | Performed by: NURSE PRACTITIONER

## 2019-05-30 NOTE — PROGRESS NOTES
"Subjective:       Patient ID: Bhavna Landry is a 63 y.o. female.    Vitals:  height is 5' 2.5" (1.588 m) and weight is 47.6 kg (105 lb). Her oral temperature is 97.1 °F (36.2 °C). Her blood pressure is 106/62 and her pulse is 81. Her respiration is 18 and oxygen saturation is 96%.     Chief Complaint: Laceration (lt middle finger .5 hours ago)    Laceration    The incident occurred less than 1 hour ago. The laceration is located on the left hand. The laceration mechanism was a clean knife. The pain is at a severity of 8/10. The pain is moderate. The pain has been constant since onset. She reports no foreign bodies present. Her tetanus status is UTD.       Constitution: Negative for fatigue.   HENT: Negative for facial swelling and facial trauma.    Neck: Negative for neck stiffness.   Cardiovascular: Negative for chest trauma.   Eyes: Negative for eye trauma, double vision and blurred vision.   Gastrointestinal: Negative for abdominal trauma, abdominal pain and rectal bleeding.   Genitourinary: Negative for hematuria, missed menses, genital trauma and pelvic pain.   Musculoskeletal: Positive for pain and trauma. Negative for joint swelling and abnormal ROM of joint.   Skin: Negative for color change, wound, abrasion, laceration and bruising.   Neurological: Negative for dizziness, history of vertigo, light-headedness, coordination disturbances, altered mental status and loss of consciousness.   Hematologic/Lymphatic: Negative for history of bleeding disorder.   Psychiatric/Behavioral: Negative for altered mental status.       Objective:      Physical Exam   Musculoskeletal:        Hands:      Assessment:       1. Laceration of left middle finger without foreign body without damage to nail, initial encounter        Plan:         Laceration of left middle finger without foreign body without damage to nail, initial encounter      Patient Instructions   If patient notices swelling, increased tenderness, or purulent " discharge patient needs to follow up immediately.      Laceration, Extremity: Skin Glue  A laceration is a cut through the skin. You have a laceration that has been closed with skin glue. This is used on cuts that have smooth edges and are not infected.   You may need a tetanus shot. This is given if you have no record of a shot, and the object that caused the cut may lead to tetanus.  Home Care  · Your healthcare provider may prescribe an antibiotic. This is to help prevent infection. Follow all instructions for taking this medicine. Take the medicine every day until it is gone or you are told to stop. You should not have any left over.  · The healthcare provider may prescribe medicines for pain. Follow instructions for taking them.  · Follow the healthcare providers instructions on how to care for the cut.  · No bandage is needed. Skin glue peels off on its own within 5 to 10 days. Most skin wounds heal within 10 days.  · Keep the wound clean and dry. You may shower or bathe as usual, but do not use soaps, lotions, or ointments on the wound area. Do not scrub the wound. After bathing, pat the wound dry with a soft towel.  · Do not scratch, rub, or pick at the film. Do not place tape directly over the film.  · Do not apply liquids (such as peroxide), ointments, or creams to the wound while the skin glue is in place.  · Avoid activities that may reinjure your wound.  · Avoid activities that cause heavy sweating. Protect the wound from sunlight.  · Most skin wounds heal without problems. However, an infection sometimes occurs despite proper treatment. Therefore, watch for the signs of infection listed below.  Follow-up care  Follow up as directed with your healthcare provider or our staff.  When to seek medical advice  Call your health care provider right away if any of these occur:  · Wound bleeding not controlled by direct pressure  · Signs of infection, including increasing pain in the wound, increasing wound  redness or swelling, or pus or bad odor coming from the wound  · Fever of 100.4°F (38.ºC) or higher or as directed by your healthcare provider  · Wound edges re-open  · Wound changes colors  · Numbness around the wound   · Decreased movement around the injured area  Date Last Reviewed: 6/14/2015  © 8169-8021 [employer]. [address] [place] [address]. All rights reserved. This information is not intended as a substitute for professional medical care. Always follow your healthcare professional's instructions.

## 2019-05-30 NOTE — PATIENT INSTRUCTIONS
If patient notices swelling, increased tenderness, or purulent discharge patient needs to follow up immediately.      Laceration, Extremity: Skin Glue  A laceration is a cut through the skin. You have a laceration that has been closed with skin glue. This is used on cuts that have smooth edges and are not infected.   You may need a tetanus shot. This is given if you have no record of a shot, and the object that caused the cut may lead to tetanus.  Home Care  · Your healthcare provider may prescribe an antibiotic. This is to help prevent infection. Follow all instructions for taking this medicine. Take the medicine every day until it is gone or you are told to stop. You should not have any left over.  · The healthcare provider may prescribe medicines for pain. Follow instructions for taking them.  · Follow the healthcare providers instructions on how to care for the cut.  · No bandage is needed. Skin glue peels off on its own within 5 to 10 days. Most skin wounds heal within 10 days.  · Keep the wound clean and dry. You may shower or bathe as usual, but do not use soaps, lotions, or ointments on the wound area. Do not scrub the wound. After bathing, pat the wound dry with a soft towel.  · Do not scratch, rub, or pick at the film. Do not place tape directly over the film.  · Do not apply liquids (such as peroxide), ointments, or creams to the wound while the skin glue is in place.  · Avoid activities that may reinjure your wound.  · Avoid activities that cause heavy sweating. Protect the wound from sunlight.  · Most skin wounds heal without problems. However, an infection sometimes occurs despite proper treatment. Therefore, watch for the signs of infection listed below.  Follow-up care  Follow up as directed with your healthcare provider or our staff.  When to seek medical advice  Call your health care provider right away if any of these occur:  · Wound bleeding not controlled by direct pressure  · Signs of  infection, including increasing pain in the wound, increasing wound redness or swelling, or pus or bad odor coming from the wound  · Fever of 100.4°F (38.ºC) or higher or as directed by your healthcare provider  · Wound edges re-open  · Wound changes colors  · Numbness around the wound   · Decreased movement around the injured area  Date Last Reviewed: 6/14/2015  © 7625-0558 [employer]. [address] [place] [address], [place] [address]. All rights reserved. This information is not intended as a substitute for professional medical care. Always follow your healthcare professional's instructions.

## 2019-05-30 NOTE — PROCEDURES
Laceration Repair  Date/Time: 5/30/2019 2:43 PM  Performed by: Lyubov Wynne NP  Authorized by: Lyubov Wynne NP   Body area: upper extremity  Location details: left long finger  Laceration length: 4 cm  Foreign bodies: no foreign bodies  Tendon involvement: none  Nerve involvement: none  Vascular damage: no  Irrigation solution: saline  Irrigation method: syringe  Amount of cleaning: standard  Debridement: none  Degree of undermining: none  Skin closure: glue  Patient tolerance: Patient tolerated the procedure well with no immediate complications

## 2019-05-31 ENCOUNTER — OFFICE VISIT (OUTPATIENT)
Dept: OPHTHALMOLOGY | Facility: CLINIC | Age: 64
End: 2019-05-31
Payer: MEDICARE

## 2019-05-31 DIAGNOSIS — H25.12 NUCLEAR SCLEROTIC CATARACT OF LEFT EYE: ICD-10-CM

## 2019-05-31 DIAGNOSIS — H50.811 DUANE'S SYNDROME OF RIGHT EYE: ICD-10-CM

## 2019-05-31 DIAGNOSIS — H25.11 NUCLEAR SCLEROTIC CATARACT OF RIGHT EYE: Primary | ICD-10-CM

## 2019-05-31 DIAGNOSIS — H33.311 RETINAL TEAR OF RIGHT EYE: ICD-10-CM

## 2019-05-31 PROCEDURE — 92136 OPHTHALMIC BIOMETRY: CPT | Mod: RT,S$GLB,, | Performed by: OPHTHALMOLOGY

## 2019-05-31 PROCEDURE — 92014 COMPRE OPH EXAM EST PT 1/>: CPT | Mod: S$GLB,,, | Performed by: OPHTHALMOLOGY

## 2019-05-31 PROCEDURE — 92025 COMPUTERIZED CORNEAL TOPOGRAPHY: ICD-10-PCS | Mod: S$GLB,,, | Performed by: OPHTHALMOLOGY

## 2019-05-31 PROCEDURE — 92014 PR EYE EXAM, EST PATIENT,COMPREHESV: ICD-10-PCS | Mod: S$GLB,,, | Performed by: OPHTHALMOLOGY

## 2019-05-31 PROCEDURE — 99999 PR PBB SHADOW E&M-EST. PATIENT-LVL III: ICD-10-PCS | Mod: PBBFAC,,, | Performed by: OPHTHALMOLOGY

## 2019-05-31 PROCEDURE — 92025 CPTRIZED CORNEAL TOPOGRAPHY: CPT | Mod: S$GLB,,, | Performed by: OPHTHALMOLOGY

## 2019-05-31 PROCEDURE — 92136 IOL MASTER - OD - RIGHT EYE: ICD-10-PCS | Mod: RT,S$GLB,, | Performed by: OPHTHALMOLOGY

## 2019-05-31 PROCEDURE — 99999 PR PBB SHADOW E&M-EST. PATIENT-LVL III: CPT | Mod: PBBFAC,,, | Performed by: OPHTHALMOLOGY

## 2019-05-31 NOTE — PROGRESS NOTES
HPI     Dr Roberts / sea / brendan/mary    Brow ptosis OD   Retinal tear of OD   Retinal hemorrhage OS   Duane's syndrome OD   PVD OU   +lazy eye OD  S/p cyst removed from right sinus (7/9/1982)  AT's PRN OU     Pt here for cataract eval.  Pt had seen Dr Roberts and he referred pt to Dr Dunbar but pt has all of her care here at [facility] so wanted an [facility]   Ophthalmologist. Pt states blurry VA OD and difficulty with glare OU. Pt   states eye pain OD (6 on scale)    Last edited by Karen Song MD on 5/31/2019 11:18 AM. (History)            Assessment /Plan     For exam results, see Encounter Report.    Nuclear sclerotic cataract of right eye  -     IOL Master - OD - Right Eye  -     Computerized corneal topography    Nuclear sclerotic cataract of left eye    Retinal tear of right eye    Duane's syndrome of right eye      Visually significant nuclear sclerotic cataract   - Interfering with activities of daily living.  Pt desires cataract surgery for Va rehabilitation.   - R/B/A discussed and pt agrees to proceed with surgery.   - IOL options discussed according to patient's goals and concomitant ocular pathology; and pt content with monofocal lens.    - Target: plano.    pcboo 16.5 OD    (pcboo 15.0 OS - when ready)    *pt has astig - okay with Rx FT post sx.    Brow ptosis OD s/p repair - sea  - will need eval for additional repair post cat sx.    Retinal tear of OD   Retinal hemorrhage OS   Duane's syndrome OD   PVD OU   +lazy eye OD  S/p cyst removed from right sinus (7/9/1982)  AT's PRN OU

## 2019-05-31 NOTE — LETTER
[facility]  [address]  [facility] [address]  Phone: [phone]  Fax: [phone]   May 31, 2019    Boo MEEK Lozao, OD  [address]  [address] [address]  [place] [id number]    Patient: Bhavna Landry   MR Number: [id number]   YOB: 1955   Date of Visit: 5/31/2019       Dear Dr. Medina:    Thank you for referring Bhavna Landry to me for evaluation. Here is my assessment and plan of care:      Nuclear sclerotic cataract of right eye  Nuclear sclerotic cataract of left eye  Retinal tear of right eye  Duane's syndrome    Visually significant nuclear sclerotic cataract OD>OS  - Interfering with activities of daily living.  Pt desires cataract surgery for [facility] rehabilitation.   - R/B/A discussed and pt agrees to proceed with surgery.   - IOL options discussed according to patient's goals and concomitant ocular pathology; and pt content with monofocal lens.    - Target: plano.    *pt has astig - okay with Rx FT post sx.    Brow ptosis OD s/p repair - sea  - will need eval for additional repair post cat sx.    H/o Retinal tear of OD with retinal hemorrhage OS      Below you will find my full exam findings. If you have questions, please do not hesitate to call me. I look forward to following Ms. Bhavna Landry along with you.    Sincerely,        Karen Song MD       CC  No Recipients             Base Eye Exam     Visual Acuity (Snellen - Linear)       Right Left    Dist cc 20/50+1 20/25+1    Correction:  Glasses          Tonometry (Tonopen, 11:08 AM)       Right Left    Pressure 18 19          Pupils       Pupils APD    Right PERRL None    Left PERRL None          Extraocular Movement       Right Left     -- -- --   --  --   -- -- --    0 0 0   0  0   0 0 0             Neuro/Psych     Oriented x3:  Yes    Mood/Affect:  Normal          Dilation     Both eyes:  1% Mydriacyl, 2.5% Phenylephrine @ 11:08 AM            Additional Tests     Glare Testing       Medium High    Right 20/50  20/50-2    Left 20/25-2 20/25-2            Slit Lamp and Fundus Exam     External Exam       Right Left    External Normal Normal          Slit Lamp Exam       Right Left    Lids/Lashes ptosis Normal    Conjunctiva/Sclera White and quiet White and quiet    Cornea Clear Clear    Anterior Chamber Deep and quiet Deep and quiet    Iris Round and reactive Round and reactive    Lens Nuclear sclerosis 2+ /cortical changes Nuclear sclerosis 2+ /cortical changes    Vitreous Normal Posterior vitreous detachment (shallow)          Fundus Exam       Right Left    Disc Normal Normal    C/D Ratio 0.1 0.1    Macula Normal Normal    Vessels Normal Normal    Periphery Elevated area with depigmentation at 7 o'clock no holes or tears            Refraction     Manifest Refraction       Sphere Cylinder Axis Dist VA    Right -5.50 Sphere  20/40-3    Left -5.75 +0.50 045 20/20-2

## 2019-06-03 ENCOUNTER — TELEPHONE (OUTPATIENT)
Dept: OPHTHALMOLOGY | Facility: CLINIC | Age: 64
End: 2019-06-03

## 2019-06-03 NOTE — TELEPHONE ENCOUNTER
----- Message from Bernice Cyo sent at 6/3/2019 11:04 AM CDT -----  Contact: Bhavna  Needs Advice    Reason for call:Pt called to schedule her second cataract surgery.        Communication Preference:[phone]    Additional Information:  LM for pt to return call. [facility]

## 2019-06-10 ENCOUNTER — TELEPHONE (OUTPATIENT)
Dept: OPHTHALMOLOGY | Facility: CLINIC | Age: 64
End: 2019-06-10

## 2019-06-10 NOTE — TELEPHONE ENCOUNTER
----- Message from Vidhi Pope sent at 6/7/2019  3:36 PM CDT -----  Contact: Bhavna  Pt calling to find out if you were able to discuss her concern with Dr. Song regarding the medication.  She can be reached at [phone]  Spoke with pt.  Will call drops into [facility] 1 week before surgery and Mail new instructions.  [facility]

## 2019-06-18 RX ORDER — HYDROCHLOROTHIAZIDE 25 MG/1
TABLET ORAL
Qty: 30 TABLET | Refills: 11 | Status: SHIPPED | OUTPATIENT
Start: 2019-06-18 | End: 2020-06-29 | Stop reason: ALTCHOICE

## 2019-06-19 ENCOUNTER — OFFICE VISIT (OUTPATIENT)
Dept: PSYCHIATRY | Facility: CLINIC | Age: 64
End: 2019-06-19
Payer: COMMERCIAL

## 2019-06-19 VITALS
SYSTOLIC BLOOD PRESSURE: 144 MMHG | HEART RATE: 84 BPM | DIASTOLIC BLOOD PRESSURE: 64 MMHG | WEIGHT: 104.5 LBS | BODY MASS INDEX: 18.81 KG/M2

## 2019-06-19 DIAGNOSIS — F33.41 RECURRENT MAJOR DEPRESSIVE DISORDER, IN PARTIAL REMISSION: ICD-10-CM

## 2019-06-19 DIAGNOSIS — G47.00 INSOMNIA, UNSPECIFIED TYPE: ICD-10-CM

## 2019-06-19 DIAGNOSIS — G25.81 RESTLESS LEGS: ICD-10-CM

## 2019-06-19 DIAGNOSIS — F20.9 SCHIZOPHRENIA, UNSPECIFIED TYPE: Primary | ICD-10-CM

## 2019-06-19 PROCEDURE — 99214 OFFICE O/P EST MOD 30 MIN: CPT | Mod: S$GLB,,, | Performed by: INTERNAL MEDICINE

## 2019-06-19 PROCEDURE — 99999 PR PBB SHADOW E&M-EST. PATIENT-LVL III: CPT | Mod: PBBFAC,,, | Performed by: INTERNAL MEDICINE

## 2019-06-19 PROCEDURE — 99499 UNLISTED E&M SERVICE: CPT | Mod: S$GLB,,, | Performed by: INTERNAL MEDICINE

## 2019-06-19 PROCEDURE — 99499 RISK ADDL DX/OHS AUDIT: ICD-10-PCS | Mod: S$GLB,,, | Performed by: INTERNAL MEDICINE

## 2019-06-19 PROCEDURE — 99999 PR PBB SHADOW E&M-EST. PATIENT-LVL III: ICD-10-PCS | Mod: PBBFAC,,, | Performed by: INTERNAL MEDICINE

## 2019-06-19 PROCEDURE — 99214 PR OFFICE/OUTPT VISIT, EST, LEVL IV, 30-39 MIN: ICD-10-PCS | Mod: S$GLB,,, | Performed by: INTERNAL MEDICINE

## 2019-06-19 RX ORDER — RISPERIDONE 2 MG/1
2 TABLET ORAL NIGHTLY
Qty: 90 TABLET | Refills: 3 | Status: SHIPPED | OUTPATIENT
Start: 2019-06-19 | End: 2019-12-03 | Stop reason: SDUPTHER

## 2019-06-19 RX ORDER — RISPERIDONE 4 MG/1
4 TABLET ORAL EVERY MORNING
Qty: 90 TABLET | Refills: 3 | Status: SHIPPED | OUTPATIENT
Start: 2019-06-19 | End: 2019-12-03 | Stop reason: SDUPTHER

## 2019-06-19 NOTE — PROGRESS NOTES
OUTPATIENT PSYCHIATRY RETURN VISIT    ENCOUNTER DATE:  6/20/2019  SITE:  [facility]  LENGTH OF SESSION:  20 minutes    CHIEF COMPLAINT:  No chief complaint on file.    HISTORY OF PRESENTING ILLNESS:  Bhavna Landry is a 63 y.o. female with history of Paranoid Schizophrenia, Depression, and Insomnia who presents for follow up appointment.     Plan at last appointment on 2/26/2019:  · Patient prefers to continue Risperdal 4mg/2mg for now.  Can always increase back to 4mg BID for worsening symptoms in the future.    · No EPS on interview today.  Continue Benadryl 50mg qHS.  · Continue Effexor 150mg daily for depression.  · Continue Neurontin 300mg qHS for restless leg and sleep.    · Discussed with patient and daughter informed consent, risks versus benefits, alternative treatments, side effect profile and the inherent unpredictability of individual responses to these treatments.  The patient and daughter express understanding of the above and display the capacity to agree with this current plan.    History as told by patient and daughter:  Says she has been feeling great since last appointment.  Still taking Risperdal 4mg in the morning and 2mg before bed.  Reports mood has been relatively stable.  Depression comes and goes but does not last long.  Denies anxiety.  Denies paranoia, delusions, or AVH since being on Risperdal.  Family also feels she is doing really well, so let her come to appointment alone today.  Denies SI or HI.     Medication side effects:  No  Medication compliance:  Yes    PSYCHIATRIC REVIEW OF SYSTEMS:  Trouble with sleep:  Denies  Appetite changes:  Denies  Weight changes:  Denies  Lack of energy:  Denies  Anhedonia:  Denies  Somatic symptoms:  Denies  Libido:  Denies  Anxiety/panic:  Denies  Guilty/hopeless:  Denies  Self-injurious behavior/risky behavior:  Denies  Any drugs:  Denies  Alcohol:  Denies    MEDICAL REVIEW OF SYSTEMS:  Complete review of systems  performed covering Constitutional, Musculoskeletal, Neurologic.  All systems negative except for that covered in HPI.    PAST PSYCHIATRIC, MEDICAL, AND SOCIAL HISTORY REVIEWED  The patient's past medical, family and social history have been reviewed and updated as appropriate within the electronic medical record - see encounter notes.    MEDICATIONS:    Current Outpatient Medications:     albuterol (PROVENTIL/VENTOLIN HFA) 90 mcg/actuation inhaler, Inhale 2 puffs into the lungs every 6 (six) hours as needed for Wheezing., Disp: 1 each, Rfl: 11    albuterol (VENTOLIN HFA) 90 mcg/actuation inhaler, Inhale 2 puffs into the lungs every 6 (six) hours as needed for Wheezing. Rescue, Disp: 1 each, Rfl: 11    amLODIPine (NORVASC) 5 MG tablet, Take 1 tablet (5 mg total) by mouth once daily., Disp: 30 tablet, Rfl: 0    aspirin 325 MG tablet, Take 325 mg by mouth once daily., Disp: , Rfl:     atorvastatin (LIPITOR) 40 MG tablet, TAKE 1 TABLET(40 MG) BY MOUTH EVERY DAY, Disp: 90 tablet, Rfl: 3    baclofen (LIORESAL) 20 MG tablet, Take 1 tablet (20 mg total) by mouth 3 (three) times daily., Disp: 90 tablet, Rfl: 11    diclofenac sodium (VOLTAREN) 1 % Gel, Apply 2 g topically once daily., Disp: 1 Tube, Rfl: 3    gabapentin (NEURONTIN) 300 MG capsule, Take 1 capsule (300 mg total) by mouth every evening., Disp: 60 capsule, Rfl: 5    hydroCHLOROthiazide (HYDRODIURIL) 25 MG tablet, TAKE 1 TABLET(25 MG) BY MOUTH EVERY DAY, Disp: 30 tablet, Rfl: 11    lisinopril 10 MG tablet, Take 10 mg by mouth once daily., Disp: , Rfl:     mupirocin (BACTROBAN) 2 % ointment, Apply to affected area 3 times daily, Disp: 22 g, Rfl: 0    risperiDONE (RISPERDAL) 2 MG tablet, Take 1 tablet (2 mg total) by mouth every evening., Disp: 90 tablet, Rfl: 3    risperiDONE (RISPERDAL) 4 MG tablet, Take 1 tablet (4 mg total) by mouth every morning., Disp: 90 tablet, Rfl: 3    secukinumab (COSENTYX PEN) 150 mg/mL PnIj, Inject 150 mg into the skin  "every 4 weeks, Disp: 1 mL, Rfl: 0    sulfaSALAzine (AZULFIDINE) 500 MG TbEC, TAKE 1 TABLET BY MOUTH TWICE DAILY FOR 1 WEEK, THEN 2 TABLETS TWICE DAILY FOR 1 WEEK, THEN 3 TABLETS BY MOUTH TWICE DAILY, Disp: 225 tablet, Rfl: 1    sulfaSALAzine (AZULFIDINE) 500 MG TbEC, TAKE 3 TABLETS(1500 MG) BY MOUTH TWICE DAILY, Disp: 540 tablet, Rfl: 0    tobramycin-dexamethasone 0.3-0.1% (TOBRADEX) 0.3-0.1 % Oint, Place a thin ribbon of ointment to suture line 3 times daily., Disp: 3.5 g, Rfl: 0    traMADol (ULTRAM) 50 mg tablet, TAKE 1 TABLET BY MOUTH EVERY 12 HOURS AS NEEDED FOR PAIN, Disp: 60 tablet, Rfl: 0    venlafaxine (EFFEXOR-XR) 150 MG Cp24, Take 1 capsule (150 mg total) by mouth once daily., Disp: 30 capsule, Rfl: 11    ALLERGIES:  Review of patient's allergies indicates:   Allergen Reactions    Adhesive      Other reaction(s): Rash    Chloromycetin [chloramphenicol sod succinate] Hives    Etanercept      Other reaction(s): recurrent infections    Nickel sutures [surgical stainless steel]      Allergic contact dermatitis     PSYCHIATRIC EXAM:  Vitals:    06/19/19 1021   BP: (!) 144/64   Pulse: 84   Weight: 47.4 kg (104 lb 8 oz)     Appearance:  Well groomed, appearing healthy and of stated age  Behavior:  Cooperative, pleasant, no psychomotor agitation or retardation  Speech:  Normal rate, rhythm, prosody, and volume  Mood:  "Great"  Affect:  Congruent, smiling  Thought Process:  Linear, logical, goal directed  Thought Content:  Negative for suicidal ideation, homicidal ideation, delusions or hallucinations.  Associations:  Loose  Memory:  Poor  Level of Consciousness/Orientation:  Grossly intact  Fund of Knowledge:  Fair  Attention:  Good  Language:  Fluent, able to name abstract and concrete objects  Insight:  Fair  Judgment:  Intact  Psychomotor signs:  No involuntary movements or tremor, No rigidity on exam  Gait:  Normal    RELEVANT LABS/STUDIES:    Lab Results   Component Value Date    WBC 8.20 05/21/2019 "    HGB 12.0 05/21/2019    HCT 37.5 05/21/2019     (H) 05/21/2019     05/21/2019     BMP  Lab Results   Component Value Date     05/21/2019    K 4.1 05/21/2019    CL 99 05/21/2019    CO2 30 (H) 05/21/2019    BUN 7 (L) 05/21/2019    CREATININE 0.8 05/21/2019    CALCIUM 10.0 05/21/2019    ANIONGAP 7 (L) 05/21/2019    ESTGFRAFRICA >60.0 05/21/2019    EGFRNONAA >60.0 05/21/2019     Lab Results   Component Value Date    ALT 14 05/21/2019    AST 23 05/21/2019    GGT 91 (H) 07/28/2014    ALKPHOS 65 05/21/2019    BILITOT 0.3 05/21/2019     Lab Results   Component Value Date    TSH 2.745 12/11/2018     Lab Results   Component Value Date    HGBA1C 5.3 12/11/2018       IMPRESSION:    Bhavna Landry is a 63 y.o. female with history of history of Paranoid Schizophrenia, Depression, and Insomnia who presents for follow up appointment.    Status/Progress:  Based on the examination today, the patient's problem(s) is/are well controlled.  New problems have not been presented today.    Risk Parameters:  Patient reports no suicidal ideation  Patient reports no homicidal ideation  Patient reports no self-injurious behavior  Patient reports no violent behavior    DIAGNOSES:    ICD-10-CM ICD-9-CM   1. Schizophrenia, unspecified type F20.9 295.90   2. Recurrent major depressive disorder, in partial remission F33.41 296.35   3. Insomnia, unspecified type G47.00 780.52   4. Restless legs G25.81 333.94      PLAN:  · Symptoms stable at this time.    · Continue Risperdal 4mg/2mg for psychosis and Benadryl 50mg qHS for insomnia and EPS prevention.  · Continue Effexor 150mg daily for depression.  · Continue Neurontin 300mg qHS for restless leg and insomnia.    RETURN TO CLINIC:  Follow up in about 6 months (around 12/19/2019).

## 2019-06-20 PROBLEM — F33.41 RECURRENT MAJOR DEPRESSIVE DISORDER, IN PARTIAL REMISSION: Status: ACTIVE | Noted: 2018-08-02

## 2019-06-21 ENCOUNTER — TELEPHONE (OUTPATIENT)
Dept: OPHTHALMOLOGY | Facility: CLINIC | Age: 64
End: 2019-06-21

## 2019-06-21 DIAGNOSIS — H25.11 NUCLEAR SCLEROTIC CATARACT OF RIGHT EYE: Primary | ICD-10-CM

## 2019-06-21 NOTE — TELEPHONE ENCOUNTER
----- Message from Kody Brown sent at 6/21/2019 12:50 PM CDT -----  Contact: Bhavna  Ms. Pierre says that you ordered the eye drops two before the operation. She can be reached at [phone]   for pt that drops are requested 1 week prior to surgery.  Aked her to return call if she did not receive  Paperwork.  [facility]

## 2019-06-25 RX ORDER — TRAMADOL HYDROCHLORIDE 50 MG/1
TABLET ORAL
Qty: 60 TABLET | Refills: 0 | Status: SHIPPED | OUTPATIENT
Start: 2019-06-25 | End: 2019-07-23 | Stop reason: SDUPTHER

## 2019-06-26 ENCOUNTER — OFFICE VISIT (OUTPATIENT)
Dept: DERMATOLOGY | Facility: CLINIC | Age: 64
End: 2019-06-26
Payer: MEDICARE

## 2019-06-26 ENCOUNTER — TELEPHONE (OUTPATIENT)
Dept: OPHTHALMOLOGY | Facility: CLINIC | Age: 64
End: 2019-06-26

## 2019-06-26 VITALS — WEIGHT: 104 LBS | BODY MASS INDEX: 18.72 KG/M2

## 2019-06-26 DIAGNOSIS — L81.4 LENTIGINES: Primary | ICD-10-CM

## 2019-06-26 DIAGNOSIS — B08.1 BATEMAN'S DISEASE: ICD-10-CM

## 2019-06-26 DIAGNOSIS — Z87.2 HISTORY OF ACTINIC KERATOSES: ICD-10-CM

## 2019-06-26 PROCEDURE — 99213 PR OFFICE/OUTPT VISIT, EST, LEVL III, 20-29 MIN: ICD-10-PCS | Mod: S$GLB,,, | Performed by: DERMATOLOGY

## 2019-06-26 PROCEDURE — 99999 PR PBB SHADOW E&M-EST. PATIENT-LVL II: CPT | Mod: PBBFAC,,, | Performed by: DERMATOLOGY

## 2019-06-26 PROCEDURE — 3008F BODY MASS INDEX DOCD: CPT | Mod: CPTII,S$GLB,, | Performed by: DERMATOLOGY

## 2019-06-26 PROCEDURE — 99213 OFFICE O/P EST LOW 20 MIN: CPT | Mod: S$GLB,,, | Performed by: DERMATOLOGY

## 2019-06-26 PROCEDURE — 1101F PT FALLS ASSESS-DOCD LE1/YR: CPT | Mod: CPTII,S$GLB,, | Performed by: DERMATOLOGY

## 2019-06-26 PROCEDURE — 1101F PR PT FALLS ASSESS DOC 0-1 FALLS W/OUT INJ PAST YR: ICD-10-PCS | Mod: CPTII,S$GLB,, | Performed by: DERMATOLOGY

## 2019-06-26 PROCEDURE — 3008F PR BODY MASS INDEX (BMI) DOCUMENTED: ICD-10-PCS | Mod: CPTII,S$GLB,, | Performed by: DERMATOLOGY

## 2019-06-26 PROCEDURE — 99999 PR PBB SHADOW E&M-EST. PATIENT-LVL II: ICD-10-PCS | Mod: PBBFAC,,, | Performed by: DERMATOLOGY

## 2019-06-26 NOTE — PROGRESS NOTES
Subjective:       Patient ID:  Bhavna Landry is a 63 y.o. female who presents for   Chief Complaint   Patient presents with    Spot     bilateal arms, no tx     Skin Check     UBSE     Spot  - Initial  Affected locations: right arm, back and chest  Aggravated by: nothing  Improvement on treatment: no relief        Review of Systems   Constitutional: Negative for fever, chills, weight loss, weight gain, fatigue, night sweats and malaise.   Skin: Positive for daily sunscreen use, activity-related sunscreen use and wears hat.   Hematologic/Lymphatic: Bruises/bleeds easily.        Objective:    Physical Exam   Constitutional: She appears well-developed and well-nourished. No distress.   Neurological: She is alert and oriented to person, place, and time. She is not disoriented.   Psychiatric: She has a normal mood and affect.   Skin:   Areas Examined (abnormalities noted in diagram):   Scalp / Hair Palpated and Inspected  Head / Face Inspection Performed  Neck Inspection Performed  Chest / Axilla Inspection Performed  Back Inspection Performed  RUE Inspected  LUE Inspection Performed                   Diagram Legend     Erythematous scaling macule/papule c/w actinic keratosis       Vascular papule c/w angioma      Pigmented verrucoid papule/plaque c/w seborrheic keratosis      Yellow umbilicated papule c/w sebaceous hyperplasia      Irregularly shaped tan macule c/w lentigo     1-2 mm smooth white papules consistent with Milia      Movable subcutaneous cyst with punctum c/w epidermal inclusion cyst      Subcutaneous movable cyst c/w pilar cyst      Firm pink to brown papule c/w dermatofibroma      Pedunculated fleshy papule(s) c/w skin tag(s)      Evenly pigmented macule c/w junctional nevus     Mildly variegated pigmented, slightly irregular-bordered macule c/w mildly atypical nevus      Flesh colored to evenly pigmented papule c/w intradermal nevus       Pink pearly papule/plaque c/w basal cell carcinoma       "Erythematous hyperkeratotic cursted plaque c/w SCC      Surgical scar with no sign of skin cancer recurrence      Open and closed comedones      Inflammatory papules and pustules      Verrucoid papule consistent consistent with wart     Erythematous eczematous patches and plaques     Dystrophic onycholytic nail with subungual debris c/w onychomycosis     Umbilicated papule    Erythematous-base heme-crusted tan verrucoid plaque consistent with inflamed seborrheic keratosis     Erythematous Silvery Scaling Plaque c/w Psoriasis     See annotation      Assessment / Plan:        Lentigines  The "ABCD" rules to observe pigmented lesions were reviewed.      History of actinic keratoses      Willi's disease  Long sleeves  sunscreen             Follow up in about 1 year (around 6/26/2020).  "

## 2019-06-27 ENCOUNTER — HOSPITAL ENCOUNTER (OUTPATIENT)
Dept: RADIOLOGY | Facility: HOSPITAL | Age: 64
Discharge: HOME | End: 2019-06-27
Attending: PHYSICIAN ASSISTANT
Payer: MEDICARE

## 2019-06-27 ENCOUNTER — OFFICE VISIT (OUTPATIENT)
Dept: ORTHOPEDICS | Facility: CLINIC | Age: 64
End: 2019-06-27
Payer: MEDICARE

## 2019-06-27 VITALS
SYSTOLIC BLOOD PRESSURE: 107 MMHG | BODY MASS INDEX: 18.59 KG/M2 | HEIGHT: 63 IN | WEIGHT: 104.94 LBS | DIASTOLIC BLOOD PRESSURE: 66 MMHG | HEART RATE: 92 BPM

## 2019-06-27 DIAGNOSIS — R60.9 SWELLING: ICD-10-CM

## 2019-06-27 DIAGNOSIS — R52 PAIN: ICD-10-CM

## 2019-06-27 DIAGNOSIS — S93.602A FOOT SPRAIN, LEFT, INITIAL ENCOUNTER: Primary | ICD-10-CM

## 2019-06-27 PROCEDURE — 3008F PR BODY MASS INDEX (BMI) DOCUMENTED: ICD-10-PCS | Mod: CPTII,S$GLB,, | Performed by: PHYSICIAN ASSISTANT

## 2019-06-27 PROCEDURE — 1101F PR PT FALLS ASSESS DOC 0-1 FALLS W/OUT INJ PAST YR: ICD-10-PCS | Mod: CPTII,S$GLB,, | Performed by: PHYSICIAN ASSISTANT

## 2019-06-27 PROCEDURE — 1101F PT FALLS ASSESS-DOCD LE1/YR: CPT | Mod: CPTII,S$GLB,, | Performed by: PHYSICIAN ASSISTANT

## 2019-06-27 PROCEDURE — 3074F SYST BP LT 130 MM HG: CPT | Mod: CPTII,S$GLB,, | Performed by: PHYSICIAN ASSISTANT

## 2019-06-27 PROCEDURE — 99999 PR PBB SHADOW E&M-EST. PATIENT-LVL IV: ICD-10-PCS | Mod: PBBFAC,,, | Performed by: PHYSICIAN ASSISTANT

## 2019-06-27 PROCEDURE — 99203 PR OFFICE/OUTPT VISIT, NEW, LEVL III, 30-44 MIN: ICD-10-PCS | Mod: S$GLB,,, | Performed by: PHYSICIAN ASSISTANT

## 2019-06-27 PROCEDURE — 73630 XR FOOT COMPLETE 3 VIEW LEFT: ICD-10-PCS | Mod: 26,LT,, | Performed by: RADIOLOGY

## 2019-06-27 PROCEDURE — 73630 X-RAY EXAM OF FOOT: CPT | Mod: TC,LT

## 2019-06-27 PROCEDURE — 3008F BODY MASS INDEX DOCD: CPT | Mod: CPTII,S$GLB,, | Performed by: PHYSICIAN ASSISTANT

## 2019-06-27 PROCEDURE — 73630 X-RAY EXAM OF FOOT: CPT | Mod: 26,LT,, | Performed by: RADIOLOGY

## 2019-06-27 PROCEDURE — 99999 PR PBB SHADOW E&M-EST. PATIENT-LVL IV: CPT | Mod: PBBFAC,,, | Performed by: PHYSICIAN ASSISTANT

## 2019-06-27 PROCEDURE — 99203 OFFICE O/P NEW LOW 30 MIN: CPT | Mod: S$GLB,,, | Performed by: PHYSICIAN ASSISTANT

## 2019-06-27 PROCEDURE — 3078F DIAST BP <80 MM HG: CPT | Mod: CPTII,S$GLB,, | Performed by: PHYSICIAN ASSISTANT

## 2019-06-27 PROCEDURE — 3078F PR MOST RECENT DIASTOLIC BLOOD PRESSURE < 80 MM HG: ICD-10-PCS | Mod: CPTII,S$GLB,, | Performed by: PHYSICIAN ASSISTANT

## 2019-06-27 PROCEDURE — 3074F PR MOST RECENT SYSTOLIC BLOOD PRESSURE < 130 MM HG: ICD-10-PCS | Mod: CPTII,S$GLB,, | Performed by: PHYSICIAN ASSISTANT

## 2019-07-03 ENCOUNTER — TELEPHONE (OUTPATIENT)
Dept: OPHTHALMOLOGY | Facility: CLINIC | Age: 64
End: 2019-07-03

## 2019-07-11 DIAGNOSIS — L40.50 PSORIATIC ARTHRITIS: ICD-10-CM

## 2019-07-11 RX ORDER — SULFASALAZINE 500 MG/1
TABLET, DELAYED RELEASE ORAL
Qty: 540 TABLET | Refills: 0 | Status: SHIPPED | OUTPATIENT
Start: 2019-07-11 | End: 2019-09-24 | Stop reason: SDUPTHER

## 2019-07-15 ENCOUNTER — TELEPHONE (OUTPATIENT)
Dept: OPHTHALMOLOGY | Facility: CLINIC | Age: 64
End: 2019-07-15

## 2019-07-15 ENCOUNTER — TELEPHONE (OUTPATIENT)
Dept: RHEUMATOLOGY | Facility: CLINIC | Age: 64
End: 2019-07-15

## 2019-07-15 DIAGNOSIS — H25.12 NUCLEAR SCLEROTIC CATARACT OF LEFT EYE: Primary | ICD-10-CM

## 2019-07-15 NOTE — TELEPHONE ENCOUNTER
----- Message from Laurence Flanagan sent at 7/15/2019 10:55 AM CDT -----  Contact: Bhavna  Needs Advice    Reason for call: Pt stated she needed to speak with you.         Communication Preference: [phone]    Additional Information:    Pt moved second ye surgery to 9/26/19.  [facility]

## 2019-07-15 NOTE — TELEPHONE ENCOUNTER
Patient called me back, she can only come on mondays  Or wednesdays  For an appt, I cancelled her with dr pacheco and scheduled her with dr cristobal on a wednesday

## 2019-07-17 ENCOUNTER — TELEPHONE (OUTPATIENT)
Dept: INTERNAL MEDICINE | Facility: CLINIC | Age: 64
End: 2019-07-17

## 2019-07-17 NOTE — TELEPHONE ENCOUNTER
----- Message from Viktor Drake sent at 7/17/2019  1:40 PM CDT -----  Contact: Pt. [phone]  She is requesting orders/referral to get a colonoscopy.    Thank you

## 2019-07-19 ENCOUNTER — NURSE TRIAGE (OUTPATIENT)
Dept: ADMINISTRATIVE | Facility: CLINIC | Age: 64
End: 2019-07-19

## 2019-07-19 NOTE — TELEPHONE ENCOUNTER
Her bp was 91/50 at 2 pm, and she has no energy and a headache all day.  She takes lisinopril and hctz, she did not take the hctz today.  103/53, pulse 78 now.  Her usual bp is 140's/70-80's.  Reports some dizziness when she goes to stand up from seated or lying position, only last a few seconds.  Offered appt today, she declined due to not having a car.  Will have a car tomorrow, scheduled her for tomorrow am.      Reason for Disposition   Systolic BP < 90 and NOT dizzy, lightheaded or weak    Protocols used: LOW BLOOD PRESSURE-A-OH

## 2019-07-22 ENCOUNTER — TELEPHONE (OUTPATIENT)
Dept: INTERNAL MEDICINE | Facility: CLINIC | Age: 64
End: 2019-07-22

## 2019-07-22 DIAGNOSIS — Z86.010 PERSONAL HISTORY OF COLONIC POLYPS: Primary | ICD-10-CM

## 2019-07-22 NOTE — TELEPHONE ENCOUNTER
pls call - last polyps in 2016 were hyperplastic.  NOT precancerous.    Colonosocpy not due til 2021 accroding to Dr Connelly..

## 2019-07-22 NOTE — TELEPHONE ENCOUNTER
----- Message from Yenni Nagel sent at 7/22/2019 11:56 AM CDT -----  Contact: [phone]  Patient is requesting a call from the office regarding order/referral for a colonoscopy.    Please advise, thank you

## 2019-07-23 ENCOUNTER — TELEPHONE (OUTPATIENT)
Dept: INTERNAL MEDICINE | Facility: CLINIC | Age: 64
End: 2019-07-23

## 2019-07-23 RX ORDER — TRAMADOL HYDROCHLORIDE 50 MG/1
TABLET ORAL
Qty: 60 TABLET | Refills: 0 | Status: SHIPPED | OUTPATIENT
Start: 2019-07-23 | End: 2019-08-18 | Stop reason: SDUPTHER

## 2019-07-23 NOTE — TELEPHONE ENCOUNTER
Patient states she will call back for an appt , she is not sure when she can come in for the Tramadol refill

## 2019-07-26 ENCOUNTER — TELEPHONE (OUTPATIENT)
Dept: OPHTHALMOLOGY | Facility: CLINIC | Age: 64
End: 2019-07-26

## 2019-07-26 NOTE — TELEPHONE ENCOUNTER
----- Message from Laurence Flanagan sent at 7/26/2019  2:00 PM CDT -----  Contact: Bhavna  Needs Advice    Reason for call: Pt stated she needed to speak with you about her eye drops. Pt stated she forgot what you told her about which drops to take after surgery.         Communication Preference: [phone]    Additional Information:    Reviewed drops again with pt.  [facility]

## 2019-07-31 ENCOUNTER — TELEPHONE (OUTPATIENT)
Dept: OPTOMETRY | Facility: CLINIC | Age: 64
End: 2019-07-31

## 2019-07-31 NOTE — H&P
History    Chief complaint:  Painless progressive vision loss    Present Ilness/Diagnosis: Nuclear sclerotic Cataract    Past Medical History:  has a past medical history of Allergy, Amblyopia, Anemia, Arthritis (02/02/1992), Cataract, Depression, Dry eyes, Dry mouth, Fibromyalgia (4/17/2014), Fibromyalgia, GERD (gastroesophageal reflux disease), History of psychiatric hospitalization, Hyperlipidemia (02/02/1992), Hypertension, Kidney stone, Migraine headache, Osteoporosis, Psoriatic arthritis (02/02/1992), Right knee pain, RLS (restless legs syndrome), Schizophrenia (02/02/1992), and Urinary tract infection.    Family History/Social History: refer to chart    Allergies:   Review of patient's allergies indicates:   Allergen Reactions    Chloramphenicol sod succinate Hives and Other (See Comments)    Codeine Other (See Comments)     Other reaction(s): Stomach upset    Etanercept Other (See Comments)     Other reaction(s): recurrent infections    Nickel sutures [surgical stainless steel]      Allergic contact dermatitis    Adhesive Rash     Other reaction(s): Rash       Current Medications: No current facility-administered medications for this encounter.     Current Outpatient Medications:     albuterol (PROVENTIL/VENTOLIN HFA) 90 mcg/actuation inhaler, Inhale 2 puffs into the lungs every 6 (six) hours as needed for Wheezing., Disp: 1 each, Rfl: 11    albuterol (VENTOLIN HFA) 90 mcg/actuation inhaler, Inhale 2 puffs into the lungs every 6 (six) hours as needed for Wheezing. Rescue, Disp: 1 each, Rfl: 11    amLODIPine (NORVASC) 5 MG tablet, Take 1 tablet (5 mg total) by mouth once daily., Disp: 30 tablet, Rfl: 0    aspirin 325 MG tablet, Take 325 mg by mouth once daily., Disp: , Rfl:     atorvastatin (LIPITOR) 40 MG tablet, TAKE 1 TABLET(40 MG) BY MOUTH EVERY DAY, Disp: 90 tablet, Rfl: 3    baclofen (LIORESAL) 20 MG tablet, Take 1 tablet (20 mg total) by mouth 3 (three) times daily., Disp: 90 tablet, Rfl:  11    diclofenac sodium (VOLTAREN) 1 % Gel, Apply 2 g topically once daily., Disp: 1 Tube, Rfl: 3    gabapentin (NEURONTIN) 300 MG capsule, Take 1 capsule (300 mg total) by mouth every evening., Disp: 60 capsule, Rfl: 5    hydroCHLOROthiazide (HYDRODIURIL) 25 MG tablet, TAKE 1 TABLET(25 MG) BY MOUTH EVERY DAY, Disp: 30 tablet, Rfl: 11    lisinopril 10 MG tablet, Take 10 mg by mouth once daily., Disp: , Rfl:     mupirocin (BACTROBAN) 2 % ointment, Apply to affected area 3 times daily, Disp: 22 g, Rfl: 0    risperiDONE (RISPERDAL) 2 MG tablet, Take 1 tablet (2 mg total) by mouth every evening., Disp: 90 tablet, Rfl: 3    risperiDONE (RISPERDAL) 4 MG tablet, Take 1 tablet (4 mg total) by mouth every morning., Disp: 90 tablet, Rfl: 3    secukinumab (COSENTYX PEN) 150 mg/mL PnIj, Inject 150 mg into the skin every 4 weeks, Disp: 1 mL, Rfl: 0    sulfaSALAzine (AZULFIDINE) 500 MG TbEC, TAKE 1 TABLET BY MOUTH TWICE DAILY FOR 1 WEEK, THEN 2 TABLETS TWICE DAILY FOR 1 WEEK, THEN 3 TABLETS BY MOUTH TWICE DAILY, Disp: 225 tablet, Rfl: 1    sulfaSALAzine (AZULFIDINE) 500 MG TbEC, TAKE 3 TABLETS(1500 MG) BY MOUTH TWICE DAILY, Disp: 540 tablet, Rfl: 0    tobramycin-dexamethasone 0.3-0.1% (TOBRADEX) 0.3-0.1 % Oint, Place a thin ribbon of ointment to suture line 3 times daily., Disp: 3.5 g, Rfl: 0    traMADol (ULTRAM) 50 mg tablet, TAKE 1 TABLET BY MOUTH EVERY 12 HOURS AS NEEDED FOR PAIN, Disp: 60 tablet, Rfl: 0    venlafaxine (EFFEXOR-XR) 150 MG Cp24, Take 1 capsule (150 mg total) by mouth once daily., Disp: 30 capsule, Rfl: 11    Physical Exam    BP: Vital signs stable  General: No apparent distress  HEENT: nuclear sclerotic cataract  Lungs: adequate respirations  Heart: + pulses  Abdomen: soft  Rectal/pelvic: deferred    Impression: Visually significant Cataract    Plan: Phacoemulsification with implantation of Intraocular lens

## 2019-07-31 NOTE — PRE-PROCEDURE INSTRUCTIONS
Preop instructions: NPO solids/ milk products after midnight or clears up to 2 hours before arrival (clear liquids are: water, apple juice, Gatorade & Jell-O, black coffee/no milk, cream or creamer), shower instructions, directions, leave all valuables at home, medication instructions for PM prior & am of procedure explained. Patient stated an understanding.      Patient denies any side effects or issues with anesthesia or sedation.                                                                                                                                    Pt aware that instructions apply to second eye surgery to be scheduled.  Pt verbalized an understanding.

## 2019-08-01 ENCOUNTER — ANESTHESIA EVENT (OUTPATIENT)
Dept: SURGERY | Facility: HOSPITAL | Age: 64
End: 2019-08-01
Payer: MEDICARE

## 2019-08-01 ENCOUNTER — HOSPITAL ENCOUNTER (OUTPATIENT)
Facility: HOSPITAL | Age: 64
Discharge: HOME | End: 2019-08-01
Attending: OPHTHALMOLOGY | Admitting: OPHTHALMOLOGY
Payer: MEDICARE

## 2019-08-01 ENCOUNTER — ANESTHESIA (OUTPATIENT)
Dept: SURGERY | Facility: HOSPITAL | Age: 64
End: 2019-08-01
Payer: MEDICARE

## 2019-08-01 VITALS
BODY MASS INDEX: 18.07 KG/M2 | HEART RATE: 77 BPM | DIASTOLIC BLOOD PRESSURE: 70 MMHG | TEMPERATURE: 98 F | RESPIRATION RATE: 18 BRPM | SYSTOLIC BLOOD PRESSURE: 169 MMHG | WEIGHT: 102 LBS | HEIGHT: 63 IN | OXYGEN SATURATION: 98 %

## 2019-08-01 DIAGNOSIS — H25.10 SENILE NUCLEAR SCLEROSIS: ICD-10-CM

## 2019-08-01 DIAGNOSIS — H25.11 NUCLEAR SCLEROTIC CATARACT OF RIGHT EYE: Primary | ICD-10-CM

## 2019-08-01 PROCEDURE — 25000003 PHARM REV CODE 250

## 2019-08-01 PROCEDURE — 63600175 PHARM REV CODE 636 W HCPCS: Performed by: OPHTHALMOLOGY

## 2019-08-01 PROCEDURE — D9220A PRA ANESTHESIA: ICD-10-PCS | Mod: ANES,,, | Performed by: ANESTHESIOLOGY

## 2019-08-01 PROCEDURE — 37000009 HC ANESTHESIA EA ADD 15 MINS: Performed by: OPHTHALMOLOGY

## 2019-08-01 PROCEDURE — V2632 POST CHMBR INTRAOCULAR LENS: HCPCS | Performed by: OPHTHALMOLOGY

## 2019-08-01 PROCEDURE — D9220A PRA ANESTHESIA: Mod: CRNA,,, | Performed by: NURSE ANESTHETIST, CERTIFIED REGISTERED

## 2019-08-01 PROCEDURE — 66984 XCAPSL CTRC RMVL W/O ECP: CPT | Mod: RT,,, | Performed by: OPHTHALMOLOGY

## 2019-08-01 PROCEDURE — C9447 INJ, PHENYLEPHRINE KETOROLAC: HCPCS | Performed by: OPHTHALMOLOGY

## 2019-08-01 PROCEDURE — D9220A PRA ANESTHESIA: ICD-10-PCS | Mod: CRNA,,, | Performed by: NURSE ANESTHETIST, CERTIFIED REGISTERED

## 2019-08-01 PROCEDURE — D9220A PRA ANESTHESIA: Mod: ANES,,, | Performed by: ANESTHESIOLOGY

## 2019-08-01 PROCEDURE — 36000707: Performed by: OPHTHALMOLOGY

## 2019-08-01 PROCEDURE — 63600175 PHARM REV CODE 636 W HCPCS: Performed by: NURSE ANESTHETIST, CERTIFIED REGISTERED

## 2019-08-01 PROCEDURE — 25000003 PHARM REV CODE 250: Performed by: OPHTHALMOLOGY

## 2019-08-01 PROCEDURE — 36000706: Performed by: OPHTHALMOLOGY

## 2019-08-01 PROCEDURE — 37000008 HC ANESTHESIA 1ST 15 MINUTES: Performed by: OPHTHALMOLOGY

## 2019-08-01 PROCEDURE — 66984 PR REMOVAL, CATARACT, W/INSRT INTRAOC LENS, W/O ENDO CYCLO: ICD-10-PCS | Mod: RT,,, | Performed by: OPHTHALMOLOGY

## 2019-08-01 PROCEDURE — 71000015 HC POSTOP RECOV 1ST HR: Performed by: OPHTHALMOLOGY

## 2019-08-01 DEVICE — LENS IOL ITEC PRELOAD 16.5D: Type: IMPLANTABLE DEVICE | Site: EYE | Status: FUNCTIONAL

## 2019-08-01 RX ORDER — LIDOCAINE HYDROCHLORIDE 10 MG/ML
INJECTION, SOLUTION EPIDURAL; INFILTRATION; INTRACAUDAL; PERINEURAL
Status: DISCONTINUED | OUTPATIENT
Start: 2019-08-01 | End: 2019-08-01 | Stop reason: HOSPADM

## 2019-08-01 RX ORDER — LIDOCAINE HYDROCHLORIDE 10 MG/ML
INJECTION, SOLUTION EPIDURAL; INFILTRATION; INTRACAUDAL; PERINEURAL
Status: DISCONTINUED
Start: 2019-08-01 | End: 2019-08-01 | Stop reason: HOSPADM

## 2019-08-01 RX ORDER — LIDOCAINE HYDROCHLORIDE 40 MG/ML
INJECTION, SOLUTION RETROBULBAR
Status: DISCONTINUED
Start: 2019-08-01 | End: 2019-08-01 | Stop reason: HOSPADM

## 2019-08-01 RX ORDER — TROPICAMIDE 10 MG/ML
1 SOLUTION/ DROPS OPHTHALMIC
Status: DISPENSED | OUTPATIENT
Start: 2019-08-01

## 2019-08-01 RX ORDER — MOXIFLOXACIN 5 MG/ML
SOLUTION/ DROPS OPHTHALMIC
Status: DISCONTINUED | OUTPATIENT
Start: 2019-08-01 | End: 2019-08-01 | Stop reason: HOSPADM

## 2019-08-01 RX ORDER — PROPARACAINE HYDROCHLORIDE 5 MG/ML
1 SOLUTION/ DROPS OPHTHALMIC
Status: DISCONTINUED | OUTPATIENT
Start: 2019-08-01 | End: 2022-05-30

## 2019-08-01 RX ORDER — PREDNISOLONE ACETATE 10 MG/ML
SUSPENSION/ DROPS OPHTHALMIC
Status: DISCONTINUED
Start: 2019-08-01 | End: 2019-08-01 | Stop reason: HOSPADM

## 2019-08-01 RX ORDER — LORAZEPAM 2 MG/ML
0.25 INJECTION INTRAMUSCULAR ONCE AS NEEDED
Status: DISCONTINUED | OUTPATIENT
Start: 2019-08-01 | End: 2019-08-01 | Stop reason: HOSPADM

## 2019-08-01 RX ORDER — ONDANSETRON HYDROCHLORIDE 2 MG/ML
INJECTION, SOLUTION INTRAMUSCULAR; INTRAVENOUS
Status: DISCONTINUED | OUTPATIENT
Start: 2019-08-01 | End: 2019-08-01

## 2019-08-01 RX ORDER — PREDNISOLONE ACETATE 10 MG/ML
SUSPENSION/ DROPS OPHTHALMIC
Status: DISCONTINUED | OUTPATIENT
Start: 2019-08-01 | End: 2019-08-01 | Stop reason: HOSPADM

## 2019-08-01 RX ORDER — PHENYLEPHRINE HYDROCHLORIDE 25 MG/ML
1 SOLUTION/ DROPS OPHTHALMIC
Status: DISCONTINUED | OUTPATIENT
Start: 2019-08-01 | End: 2022-05-30

## 2019-08-01 RX ORDER — ACETAMINOPHEN 325 MG/1
650 TABLET ORAL EVERY 4 HOURS PRN
Status: DISCONTINUED | OUTPATIENT
Start: 2019-08-01 | End: 2019-08-01

## 2019-08-01 RX ORDER — SODIUM CHLORIDE 9 MG/ML
INJECTION, SOLUTION INTRAVENOUS CONTINUOUS
Status: DISCONTINUED | OUTPATIENT
Start: 2019-08-01 | End: 2019-08-01 | Stop reason: HOSPADM

## 2019-08-01 RX ORDER — ACETAMINOPHEN 325 MG/1
650 TABLET ORAL EVERY 4 HOURS PRN
Status: DISCONTINUED | OUTPATIENT
Start: 2019-08-01 | End: 2019-08-01 | Stop reason: HOSPADM

## 2019-08-01 RX ORDER — MOXIFLOXACIN 5 MG/ML
1 SOLUTION/ DROPS OPHTHALMIC
Status: COMPLETED | OUTPATIENT
Start: 2019-08-01 | End: 2019-08-01

## 2019-08-01 RX ORDER — KETOROLAC TROMETHAMINE 5 MG/ML
1 SOLUTION OPHTHALMIC ONCE
Status: COMPLETED | OUTPATIENT
Start: 2019-08-01 | End: 2019-08-01

## 2019-08-01 RX ORDER — MEPERIDINE HYDROCHLORIDE 50 MG/ML
12.5 INJECTION INTRAMUSCULAR; INTRAVENOUS; SUBCUTANEOUS ONCE AS NEEDED
Status: DISCONTINUED | OUTPATIENT
Start: 2019-08-01 | End: 2019-08-01 | Stop reason: HOSPADM

## 2019-08-01 RX ORDER — SODIUM CHLORIDE 9 MG/ML
INJECTION, SOLUTION INTRAVENOUS CONTINUOUS PRN
Status: DISCONTINUED | OUTPATIENT
Start: 2019-08-01 | End: 2019-08-01

## 2019-08-01 RX ORDER — LIDOCAINE HYDROCHLORIDE 40 MG/ML
INJECTION, SOLUTION RETROBULBAR
Status: DISCONTINUED | OUTPATIENT
Start: 2019-08-01 | End: 2019-08-01 | Stop reason: HOSPADM

## 2019-08-01 RX ORDER — MIDAZOLAM HYDROCHLORIDE 1 MG/ML
INJECTION INTRAMUSCULAR; INTRAVENOUS
Status: DISCONTINUED | OUTPATIENT
Start: 2019-08-01 | End: 2019-08-01

## 2019-08-01 RX ADMIN — SODIUM CHLORIDE: 0.9 INJECTION, SOLUTION INTRAVENOUS at 07:08

## 2019-08-01 RX ADMIN — TROPICAMIDE 1 DROP: 10 SOLUTION/ DROPS OPHTHALMIC at 06:08

## 2019-08-01 RX ADMIN — PROPARACAINE HYDROCHLORIDE 1 DROP: 5 SOLUTION/ DROPS OPHTHALMIC at 06:08

## 2019-08-01 RX ADMIN — PHENYLEPHRINE HYDROCHLORIDE 1 DROP: 2.5 SOLUTION/ DROPS OPHTHALMIC at 06:08

## 2019-08-01 RX ADMIN — MOXIFLOXACIN HYDROCHLORIDE 1 DROP: 5 SOLUTION/ DROPS OPHTHALMIC at 06:08

## 2019-08-01 RX ADMIN — MIDAZOLAM HYDROCHLORIDE 1 MG: 1 INJECTION, SOLUTION INTRAMUSCULAR; INTRAVENOUS at 07:08

## 2019-08-01 RX ADMIN — ONDANSETRON 4 MG: 2 INJECTION, SOLUTION INTRAMUSCULAR; INTRAVENOUS at 07:08

## 2019-08-01 RX ADMIN — KETOROLAC TROMETHAMINE 1 DROP: 5 SOLUTION OPHTHALMIC at 06:08

## 2019-08-01 NOTE — DISCHARGE SUMMARY
BRIEF DISCHARGE NOTE:    Reason for hospitalization -  Cataract surgery     Final Diagnosis - Visually significant Cataract    Procedures and treatment provided - Status post phacoemulsification with placement of intraocular lens     Diet - Advance to regular as tolerated    Activity - as tolerated    Disposition at the end of the case - Good.    Discharge: to home    The patient tolerated the procedure well and knows to follow up with me tomorrow morning in the eye clinic, sooner if needed.    Patient and family instructions (as appropriate) - Given to patient on discharge    Karen Song MD

## 2019-08-01 NOTE — ANESTHESIA PREPROCEDURE EVALUATION
08/01/2019  Bhavna Landry is a 64 y.o., female.    Anesthesia Evaluation    I have reviewed the Patient Summary Reports.    I have reviewed the Nursing Notes.      Review of Systems  Anesthesia Hx:  No problems with previous Anesthesia    Hematology/Oncology:  Hematology Normal   Oncology Normal     EENT/Dental:EENT/Dental Normal   Cardiovascular:   Hypertension    Pulmonary:  Pulmonary Normal    Renal/:   Chronic Renal Disease    Hepatic/GI:   GERD    Musculoskeletal:   Arthritis     Neurological:   Neuromuscular Disease, Headaches    Endocrine:  Endocrine Normal    Dermatological:  Skin Normal    Psych:   Psychiatric History          Physical Exam  General:  Well nourished    Airway/Jaw/Neck:  Airway Findings: Mouth Opening: Normal Tongue: Normal  General Airway Assessment: Adult  Mallampati: II  TM Distance: Normal, at least 6 cm        Eyes/Ears/Nose:  EYES/EARS/NOSE FINDINGS: Normal   Dental:  Dental Findings:   Chest/Lungs:  Chest/Lungs Clear    Heart/Vascular:  Heart Findings: Normal Heart murmur: negative Vascular Findings: Normal    Abdomen:  Abdomen Findings: Normal    Musculoskeletal:  Musculoskeletal Findings: Normal   Skin:  Skin Findings: Normal    Mental Status:  Mental Status Findings: Normal        Anesthesia Plan  Type of Anesthesia, risks & benefits discussed:  Anesthesia Type:  MAC  Patient's Preference:   Intra-op Monitoring Plan:   Intra-op Monitoring Plan Comments:   Post Op Pain Control Plan:   Post Op Pain Control Plan Comments:   Induction:   IV  Beta Blocker:  Patient is not currently on a Beta-Blocker (No further documentation required).       Informed Consent: Patient understands risks and agrees with Anesthesia plan.  Questions answered. Anesthesia consent signed with patient.  ASA Score: 3     Day of Surgery Review of History & Physical:    H&P update referred to the surgeon.          Ready For Surgery From Anesthesia Perspective.

## 2019-08-01 NOTE — TRANSFER OF CARE
"Anesthesia Transfer of Care Note    Patient: Bhavna Landry    Procedure(s) Performed: Procedure(s) (LRB):  PHACOEMULSIFICATION, CATARACT (Right)  INSERTION, IOL PROSTHESIS (Right)    Patient location: PACU    Anesthesia Type: MAC    Transport from OR: Transported from OR on room air with adequate spontaneous ventilation    Post pain: adequate analgesia    Post assessment: no apparent anesthetic complications and tolerated procedure well    Post vital signs: stable    Level of consciousness: awake, alert and oriented    Nausea/Vomiting: no nausea/vomiting    Complications: none    Transfer of care protocol was followed      Last vitals:   Visit Vitals  BP (!) 180/80 (BP Location: Right arm, Patient Position: Lying)   Pulse 78   Temp 36.7 °C (98.1 °F) (Oral)   Resp 18   Ht 5' 2.5" (1.588 m)   Wt 46.3 kg (102 lb)   SpO2 96%   Breastfeeding? No   BMI 18.36 kg/m²     "

## 2019-08-01 NOTE — DISCHARGE INSTRUCTIONS
Discharge Instructions for Cataract Surgery  A surgeon removed the cloudy lens in your eye and replaced it with a clear man-made lens. Be sure to have an adult family member or friend drive you home after surgery. Heres what you can expect following surgery and tips for a healthy recovery.  It is normal to have the following:  · Bruised or bloodshot eye for 7 days  · Itching and mild discomfort for several days  · Some fluid discharge  · Sensitivity to light  · Scratchy, sandlike feeling in the eye for 2 weeks  · Feeling tired, especially during the first 24 hours  Activity level  · Do not drive for 2 days or as instructed by your doctor.  · Do not drink alcohol for at least 24 hours.  · Avoid bending at the waist to  objects or lifting anything heavy for 2 days.  · Relax for the first 24 hours after surgery. Watching TV and reading are OK and wont harm your eye.  Eye protection  · Do not rub or press on your eye.  · Sleep on your back or on your unoperated side for 2 nights.  · If instructed, wear a bandage over your eye for 2 days and 2 nights.  · If instructed, wear a shield to protect your eye for 2 days and 2 nights.  Using eyedrops  You may be given special eyedrops or ointment. Here is one way to use eyedrops:  · Tilt your head back.  · Pull your bottom eyelid down.  · Squeeze one drop into your eye. Do not touch your eye with the bottle tip.  · Close your eyes for a few seconds.  · If you need more than one drop, wait a few minutes before adding the next one.   Call your doctor right away if you have any of the following:  · Bleeding or discharge from the eye  · Your vision suddenly becomes worse  · Pain medicine you are told to take does not ease your pain  · Nausea or vomiting  · Chills or fever over 100.4°F (39.1°C)   Date Last Reviewed: 5/30/2015  © 5326-6732 [employer]. [address] [place] [address], [place] [address]. All rights reserved. This information is not intended as a  substitute for professional medical care. Always follow your healthcare professional's instructions.

## 2019-08-01 NOTE — OP NOTE
DATE OF PROCEDURE: 08/01/2019    SURGEON: DONALDO PADGETT MD    PREOPERATIVE DIAGNOSIS:  Senile nuclear sclerotic cataract right eye.     POSTOPERATIVE DIAGNOSIS: Senile nuclear sclerotic cataract right eye.     PROCEDURE PERFORMED:  Phacoemulsification with placement of intraocular lens, right eye.    IMPLANT:  PCBOO 16.5    ANESTHESIA:  Topical and MAC    COMPLICATIONS: none    ESTIMATED BLOOD LOSS: <1cc    SPECIMENS: none    INDICATIONS FOR PROCEDURE:  This patient presented to the clinic with decreased vision in the right eye and was found to have a cataract.  The risks, benefits, and alternatives were discussed and the patient agreed to proceed with phacoemulsification and implantation of a lens in the right eye.     PROCEDURE IN DETAIL:  The patient was met in the preop holding area.  Consent was confirmed to be signed.  The operative site was marked.  The patient was brought into the operating room by the anesthesia team and placed under monitored anesthesia care.  The right eye was prepped and draped in a sterile ophthalmic fashion.  A Alvin speculum was placed into the right eye.   A paracentesis site was made and 1% preservative-free lidocaine was injected into the anterior chamber.  Viscoelastic  material was injected into the anterior chamber.  A keratome blade was used to make a clear corneal incision.  A cystotome was used to initiate the continuous curvilinear capsulorrhexis which was completed with Utrata forceps.  BSS on a davis cannula was used to perform hydrodissection.  The phacoemulsification tip was introduced into the eye and the nucleus was removed in a standard divide-and-conquer fashion.  Remaining cortical material was removed from the eye using irrigation-aspiration.  The capsular bag was filled with viscoelastic material and the intraocular lens was injected and positioned into place. Remaining viscoelastic material was removed from the eye using irrigation and aspiration.  The  corneal wounds were hydrated.  The eye was filled to physiologic pressure. The wounds were found to be watertight. Drops of Vigamox and prednisilone were placed into the eye.  The eye was washed, dried, and shielded.  The patient tolerated the procedure well and knows to follow up with me tomorrow morning, sooner if needed.

## 2019-08-01 NOTE — ANESTHESIA POSTPROCEDURE EVALUATION
Anesthesia Post Evaluation    Patient: Bhavna Landry    Procedure(s) Performed: Procedure(s) (LRB):  PHACOEMULSIFICATION, CATARACT (Right)  INSERTION, IOL PROSTHESIS (Right)    Final Anesthesia Type: MAC  Patient location during evaluation: PACU  Patient participation: Yes- Able to Participate  Level of consciousness: awake and alert  Post-procedure vital signs: reviewed and stable  Pain management: adequate  Airway patency: patent  PONV status at discharge: No PONV  Anesthetic complications: no      Cardiovascular status: blood pressure returned to baseline  Respiratory status: spontaneous ventilation and room air  Hydration status: euvolemic  Follow-up not needed.          Vitals Value Taken Time   /70 8/1/2019  8:45 AM   Temp 36.7 °C (98.1 °F) 8/1/2019  8:45 AM   Pulse 77 8/1/2019  8:45 AM   Resp 18 8/1/2019  8:45 AM   SpO2 98 % 8/1/2019  8:45 AM         No case tracking events are documented in the log.      Pain/Azra Score: Azra Score: 10 (8/1/2019  8:15 AM)

## 2019-08-02 ENCOUNTER — OFFICE VISIT (OUTPATIENT)
Dept: OPHTHALMOLOGY | Facility: CLINIC | Age: 64
End: 2019-08-02
Payer: MEDICARE

## 2019-08-02 DIAGNOSIS — H25.12 NUCLEAR SCLEROTIC CATARACT OF LEFT EYE: ICD-10-CM

## 2019-08-02 DIAGNOSIS — Z98.41 STATUS POST CATARACT EXTRACTION AND INSERTION OF INTRAOCULAR LENS, RIGHT: Primary | ICD-10-CM

## 2019-08-02 DIAGNOSIS — Z96.1 STATUS POST CATARACT EXTRACTION AND INSERTION OF INTRAOCULAR LENS, RIGHT: Primary | ICD-10-CM

## 2019-08-02 PROCEDURE — 99024 POSTOP FOLLOW-UP VISIT: CPT | Mod: S$GLB,,, | Performed by: OPHTHALMOLOGY

## 2019-08-02 PROCEDURE — 99024 PR POST-OP FOLLOW-UP VISIT: ICD-10-PCS | Mod: S$GLB,,, | Performed by: OPHTHALMOLOGY

## 2019-08-02 PROCEDURE — 99999 PR PBB SHADOW E&M-EST. PATIENT-LVL III: CPT | Mod: PBBFAC,,, | Performed by: OPHTHALMOLOGY

## 2019-08-02 PROCEDURE — 99999 PR PBB SHADOW E&M-EST. PATIENT-LVL III: ICD-10-PCS | Mod: PBBFAC,,, | Performed by: OPHTHALMOLOGY

## 2019-08-02 RX ORDER — PREDNISOLONE ACETATE 10 MG/ML
1 SUSPENSION/ DROPS OPHTHALMIC 3 TIMES DAILY
COMMUNITY
End: 2019-11-20

## 2019-08-02 RX ORDER — OFLOXACIN 3 MG/ML
1 SOLUTION/ DROPS OPHTHALMIC 3 TIMES DAILY
COMMUNITY
End: 2019-11-20

## 2019-08-02 RX ORDER — KETOROLAC TROMETHAMINE 4 MG/ML
1 SOLUTION/ DROPS OPHTHALMIC 3 TIMES DAILY
COMMUNITY
End: 2019-11-20

## 2019-08-02 NOTE — PROGRESS NOTES
HPI     Post-op Evaluation      Additional comments: 1 day              Comments     Dr Roberts / sea / brendan/ mary    s/p phaco w/IOL OD 8/1/19  Brow ptosis OD s/p repair - sea  Retinal tear of OD   Retinal hemorrhage OS   Duane's syndrome OD   PVD OU   +lazy eye OD  S/p cyst removed from right sinus (7/9/1982)    PF TID OD  Ocuflox TID OD  Ketorolac TID OD  AT's PRN OU     Pt here for 1 day post op OD.  Pt states VA OD is blurry.  Pt states eye   pain OD (7 on scale).          Last edited by Karen Song MD on 8/2/2019  2:03 PM. (History)            Assessment /Plan     For exam results, see Encounter Report.    Status post cataract extraction and insertion of intraocular lens, right    Nuclear sclerotic cataract of left eye      Slit Lamp Exam  L/L - normal  C/s - quiet  Cornea - clear  A/C - 1+ cell  Lens - PCIOL    POD #1 s/p phaco/IOL  - doing well  - continue the following drops:    vigamox or ocuflox TID x 1 wk then stop  Pred forte or durezol or dexamethasone TID x  4 wks  Ketorolac TID until runs out    Versus:    Combination drop - 1 drop TID x total of 1 month    Appropriate precautions and post op medications reviewed.  Patient instructed to call or come in if symptoms of redness, decreased vision, or pain are experienced.    -f/up 1-2wks, sooner PRN.       Cat OS - can sign consent next if ready to proceed.    Pt has date - end of sept. Will see if dany has anything avail early sept.

## 2019-08-02 NOTE — Clinical Note
Cat OS - can sign consent next if ready to proceed.Pt has date - end of sept. Will see if dany has anything avail early sept.

## 2019-08-07 ENCOUNTER — LAB VISIT (OUTPATIENT)
Dept: LAB | Facility: HOSPITAL | Age: 64
End: 2019-08-07
Attending: INTERNAL MEDICINE
Payer: MEDICARE

## 2019-08-07 ENCOUNTER — OFFICE VISIT (OUTPATIENT)
Dept: OPHTHALMOLOGY | Facility: CLINIC | Age: 64
End: 2019-08-07
Payer: MEDICARE

## 2019-08-07 ENCOUNTER — OFFICE VISIT (OUTPATIENT)
Dept: INTERNAL MEDICINE | Facility: CLINIC | Age: 64
End: 2019-08-07
Payer: MEDICARE

## 2019-08-07 VITALS
SYSTOLIC BLOOD PRESSURE: 110 MMHG | WEIGHT: 103.63 LBS | BODY MASS INDEX: 19.07 KG/M2 | DIASTOLIC BLOOD PRESSURE: 62 MMHG | OXYGEN SATURATION: 98 % | HEIGHT: 62 IN | HEART RATE: 74 BPM

## 2019-08-07 DIAGNOSIS — E78.5 HYPERLIPIDEMIA, UNSPECIFIED HYPERLIPIDEMIA TYPE: ICD-10-CM

## 2019-08-07 DIAGNOSIS — H25.12 NUCLEAR SCLEROTIC CATARACT OF LEFT EYE: ICD-10-CM

## 2019-08-07 DIAGNOSIS — Z96.1 STATUS POST CATARACT EXTRACTION AND INSERTION OF INTRAOCULAR LENS, RIGHT: Primary | ICD-10-CM

## 2019-08-07 DIAGNOSIS — I10 ESSENTIAL HYPERTENSION: Chronic | ICD-10-CM

## 2019-08-07 DIAGNOSIS — Z98.41 STATUS POST CATARACT EXTRACTION AND INSERTION OF INTRAOCULAR LENS, RIGHT: Primary | ICD-10-CM

## 2019-08-07 DIAGNOSIS — F11.90 CHRONIC NARCOTIC USE: Primary | ICD-10-CM

## 2019-08-07 DIAGNOSIS — R60.0 LEG EDEMA, LEFT: ICD-10-CM

## 2019-08-07 LAB
CHOLEST SERPL-MCNC: 151 MG/DL (ref 120–199)
CHOLEST/HDLC SERPL: 1.7 {RATIO} (ref 2–5)
HDLC SERPL-MCNC: 90 MG/DL (ref 40–75)
HDLC SERPL: 59.6 % (ref 20–50)
LDLC SERPL CALC-MCNC: 50.6 MG/DL (ref 63–159)
NONHDLC SERPL-MCNC: 61 MG/DL
TRIGL SERPL-MCNC: 52 MG/DL (ref 30–150)

## 2019-08-07 PROCEDURE — 99024 POSTOP FOLLOW-UP VISIT: CPT | Mod: S$GLB,,, | Performed by: OPHTHALMOLOGY

## 2019-08-07 PROCEDURE — 99999 PR PBB SHADOW E&M-EST. PATIENT-LVL III: CPT | Mod: PBBFAC,,, | Performed by: OPHTHALMOLOGY

## 2019-08-07 PROCEDURE — 92025 COMPUTERIZED CORNEAL TOPOGRAPHY: ICD-10-PCS | Mod: S$GLB,,, | Performed by: OPHTHALMOLOGY

## 2019-08-07 PROCEDURE — 1101F PT FALLS ASSESS-DOCD LE1/YR: CPT | Mod: CPTII,S$GLB,, | Performed by: INTERNAL MEDICINE

## 2019-08-07 PROCEDURE — 99999 PR PBB SHADOW E&M-EST. PATIENT-LVL III: CPT | Mod: PBBFAC,,, | Performed by: INTERNAL MEDICINE

## 2019-08-07 PROCEDURE — 99499 UNLISTED E&M SERVICE: CPT | Mod: S$GLB,,, | Performed by: OPHTHALMOLOGY

## 2019-08-07 PROCEDURE — 92136 OPHTHALMIC BIOMETRY: CPT | Mod: 26,LT,S$GLB, | Performed by: OPHTHALMOLOGY

## 2019-08-07 PROCEDURE — 92025 CPTRIZED CORNEAL TOPOGRAPHY: CPT | Mod: S$GLB,,, | Performed by: OPHTHALMOLOGY

## 2019-08-07 PROCEDURE — 3078F PR MOST RECENT DIASTOLIC BLOOD PRESSURE < 80 MM HG: ICD-10-PCS | Mod: CPTII,S$GLB,, | Performed by: INTERNAL MEDICINE

## 2019-08-07 PROCEDURE — 3078F DIAST BP <80 MM HG: CPT | Mod: CPTII,S$GLB,, | Performed by: INTERNAL MEDICINE

## 2019-08-07 PROCEDURE — 99214 OFFICE O/P EST MOD 30 MIN: CPT | Mod: S$GLB,,, | Performed by: INTERNAL MEDICINE

## 2019-08-07 PROCEDURE — 99999 PR PBB SHADOW E&M-EST. PATIENT-LVL III: ICD-10-PCS | Mod: PBBFAC,,, | Performed by: OPHTHALMOLOGY

## 2019-08-07 PROCEDURE — 99999 PR PBB SHADOW E&M-EST. PATIENT-LVL III: ICD-10-PCS | Mod: PBBFAC,,, | Performed by: INTERNAL MEDICINE

## 2019-08-07 PROCEDURE — 99024 PR POST-OP FOLLOW-UP VISIT: ICD-10-PCS | Mod: S$GLB,,, | Performed by: OPHTHALMOLOGY

## 2019-08-07 PROCEDURE — 92136 IOL MASTER - OU - BOTH EYES: ICD-10-PCS | Mod: 26,LT,S$GLB, | Performed by: OPHTHALMOLOGY

## 2019-08-07 PROCEDURE — 3074F PR MOST RECENT SYSTOLIC BLOOD PRESSURE < 130 MM HG: ICD-10-PCS | Mod: CPTII,S$GLB,, | Performed by: INTERNAL MEDICINE

## 2019-08-07 PROCEDURE — 1101F PR PT FALLS ASSESS DOC 0-1 FALLS W/OUT INJ PAST YR: ICD-10-PCS | Mod: CPTII,S$GLB,, | Performed by: INTERNAL MEDICINE

## 2019-08-07 PROCEDURE — 99499 RISK ADDL DX/OHS AUDIT: ICD-10-PCS | Mod: S$GLB,,, | Performed by: OPHTHALMOLOGY

## 2019-08-07 PROCEDURE — 99214 PR OFFICE/OUTPT VISIT, EST, LEVL IV, 30-39 MIN: ICD-10-PCS | Mod: S$GLB,,, | Performed by: INTERNAL MEDICINE

## 2019-08-07 PROCEDURE — 36415 COLL VENOUS BLD VENIPUNCTURE: CPT

## 2019-08-07 PROCEDURE — 80061 LIPID PANEL: CPT

## 2019-08-07 PROCEDURE — 3074F SYST BP LT 130 MM HG: CPT | Mod: CPTII,S$GLB,, | Performed by: INTERNAL MEDICINE

## 2019-08-07 NOTE — PROGRESS NOTES
Subjective:       Patient ID: Bhavna Landry is a 64 y.o. female.    Chief Complaint: Follow-up                            Chronic narcotic use.  HPI   Had ablation of Lf dorsal ramus in May.  Helped temporarily.  To be repeated in November.  Pain is primarily in lower back.  She is only taking tramadol 50 mg bid most days.  Pain more severe after house work.    C/o recent sore throat.  Hoarse for 1 week  Occas pain in abd  Some dizziness, which she attributes to having 1 cataract removed.    She is back seeing rheum for psoriatic arthritis.  Taking cosentyx and azulfidine.    Also with htn and hyperlipidemia.  Taking norvasc 5 mg.  ocas takes 10 mg lisinopril only when BP high.      C/o chronic edema l ankle - present for months.  No cp.  Intermittent wheezing - chronic cigarette smoker in past,  Review of Systems   Constitutional: Negative for fever and unexpected weight change.   HENT: Negative for congestion and postnasal drip.    Eyes: Negative for pain, discharge and visual disturbance.   Respiratory: Negative for cough, chest tightness, shortness of breath and wheezing.    Cardiovascular: Negative for chest pain and leg swelling.   Gastrointestinal: Negative for abdominal pain, constipation, diarrhea and nausea.   Genitourinary: Negative for difficulty urinating, dysuria and hematuria.   Musculoskeletal: Positive for back pain.   Skin: Negative for rash.   Neurological: Negative for headaches.   Psychiatric/Behavioral: Negative for dysphoric mood and sleep disturbance. The patient is not nervous/anxious.        Objective:      Physical Exam   Constitutional: She is oriented to person, place, and time. She appears well-developed and well-nourished. No distress.   Musculoskeletal: She exhibits edema (r calf 29.7, l 29.5).   Minimal edema L ankle - no pitting.   Neurological: She is alert and oriented to person, place, and time.   Psychiatric: She has a normal mood and affect. Her behavior is normal.        Assessment:       1. Chronic narcotic use    2. Hyperlipidemia, unspecified hyperlipidemia type    3. Leg edema, left    4. Essential hypertension        Plan:       Bhavna was seen today for follow-up.    Diagnoses and all orders for this visit:    Chronic narcotic use    Hyperlipidemia, unspecified hyperlipidemia type  -     Lipid panel; Future    Leg edema, left    Essential hypertension       call for persistent hoarseness.

## 2019-08-07 NOTE — PROGRESS NOTES
HPI     Dr Roberts / sea / brendan/ mary    s/p phaco w/IOL OD 8/1/19  Brow ptosis OD s/p repair - sea  Retinal tear of OD   Retinal hemorrhage OS   Duane's syndrome OD   PVD OU   +lazy eye OD  S/p cyst removed from right sinus (7/9/1982)    PF TID OD  Ocuflox TID OD  Ketorolac TID OD  AT's PRN OU     Pt here for 1 week post op OD.  Pt states she has been having dizziness,   headaches and eye pain OD (5 on scale).    Last edited by Katheryn Seth MA on 8/7/2019 10:04 AM. (History)              Assessment /Plan     For exam results, see Encounter Report.    Status post cataract extraction and insertion of intraocular lens, right    Nuclear sclerotic cataract of left eye  -     IOL Master - OU - Both Eyes  -     Computerized corneal topography      PO week #1 s/p phaco/IOL -    - doing well, no issues    Continue combo drops for a total of 1 month versus: d/c abx gtt, continue PF/ketorolocTID for total of 1 month    - f/up 3-4 wks for MRx, DFE    Visually significant nuclear sclerotic cataract   - Interfering with activities of daily living.  Pt desires cataract surgery for Va rehabilitation.   - R/B/A discussed and pt agrees to proceed with surgery.   - IOL options discussed according to patient's goals and concomitant ocular pathology; and pt content with monofocal lens.    - Target: plano.      (pcboo 15.0 OS - )    *pt has astig - okay with Rx FT post sx.    Brow ptosis OD s/p repair - sea  - will need eval for additional repair post cat sx.    Retinal tear of OD   Retinal hemorrhage OS   Duane's syndrome OD   PVD OU   +lazy eye OD  S/p cyst removed from right sinus (7/9/1982)

## 2019-08-16 ENCOUNTER — TELEPHONE (OUTPATIENT)
Dept: INTERNAL MEDICINE | Facility: CLINIC | Age: 64
End: 2019-08-16

## 2019-08-16 NOTE — TELEPHONE ENCOUNTER
----- Message from Monique Gonzalez sent at 8/16/2019 10:15 AM CDT -----  Contact: Patient [phone]  Pt states that she is calling to speak with someone in your office. She states that someone was supposed to give her a call in regards to setting an appt to have an ultrasound of the veins in her left leg.She states that she would like top have that appt scheduled for august 19 th or 26 th around 10 a,m. Please call back and advise.      Thanks

## 2019-08-19 RX ORDER — TRAMADOL HYDROCHLORIDE 50 MG/1
TABLET ORAL
Qty: 60 TABLET | Refills: 0 | Status: SHIPPED | OUTPATIENT
Start: 2019-08-19 | End: 2019-09-20 | Stop reason: SDUPTHER

## 2019-08-26 ENCOUNTER — HOSPITAL ENCOUNTER (OUTPATIENT)
Dept: RADIOLOGY | Facility: HOSPITAL | Age: 64
Discharge: HOME | End: 2019-08-26
Attending: INTERNAL MEDICINE
Payer: MEDICARE

## 2019-08-26 DIAGNOSIS — R60.0 LEG EDEMA, LEFT: ICD-10-CM

## 2019-08-26 PROCEDURE — 93971 US LOWER EXTREMITY VEINS LEFT: ICD-10-PCS | Mod: 26,LT,, | Performed by: INTERNAL MEDICINE

## 2019-08-26 PROCEDURE — 93971 EXTREMITY STUDY: CPT | Mod: 26,LT,, | Performed by: INTERNAL MEDICINE

## 2019-08-26 PROCEDURE — 93971 EXTREMITY STUDY: CPT | Mod: TC,LT

## 2019-08-27 ENCOUNTER — TELEPHONE (OUTPATIENT)
Dept: INTERNAL MEDICINE | Facility: CLINIC | Age: 64
End: 2019-08-27

## 2019-08-27 NOTE — TELEPHONE ENCOUNTER
----- Message from Maria D Jennings MA sent at 8/27/2019  2:40 PM CDT -----  Contact: Self Call  [phone]      ----- Message -----  From: Salena Lopes  Sent: 8/27/2019   2:25 PM  To: Alicia CASTANEDA Staff    Patient would like to get test results  Name of test (lab, mammo, etc.):  US Extremity Veins  Date of test:  8/26  Ordering provider: Dr. Abdi  Where was the test performed:  [facility]  [place]  Would the patient rather a call back or a response via [facility]?:  Phone  Comments:

## 2019-09-06 ENCOUNTER — TELEPHONE (OUTPATIENT)
Dept: OPHTHALMOLOGY | Facility: CLINIC | Age: 64
End: 2019-09-06

## 2019-09-06 NOTE — TELEPHONE ENCOUNTER
Spoke to pt regarding her concern when she can have an eye exam after the eye surgery.  Advised pt that she can have an eye exam 4-6 weeks after the surgery.  Pt understood.

## 2019-09-06 NOTE — TELEPHONE ENCOUNTER
----- Message from Charlotte Mccall sent at 9/6/2019  3:31 PM CDT -----  Contact: PHUONG HAZEL   Pt would like to speak with  nurse ,pt questions and concerns,pt can be reached at [phone] please thank you.

## 2019-09-16 ENCOUNTER — TELEPHONE (OUTPATIENT)
Dept: OPHTHALMOLOGY | Facility: CLINIC | Age: 64
End: 2019-09-16

## 2019-09-20 RX ORDER — TRAMADOL HYDROCHLORIDE 50 MG/1
TABLET ORAL
Qty: 60 TABLET | Refills: 0 | Status: SHIPPED | OUTPATIENT
Start: 2019-09-20 | End: 2019-10-15 | Stop reason: SDUPTHER

## 2019-09-24 ENCOUNTER — TELEPHONE (OUTPATIENT)
Dept: RHEUMATOLOGY | Facility: CLINIC | Age: 64
End: 2019-09-24

## 2019-09-24 DIAGNOSIS — Z51.81 ENCOUNTER FOR MONITORING SULFASALAZINE THERAPY: Primary | ICD-10-CM

## 2019-09-24 DIAGNOSIS — L40.50 PSORIATIC ARTHRITIS: ICD-10-CM

## 2019-09-24 DIAGNOSIS — Z79.899 ENCOUNTER FOR MONITORING SULFASALAZINE THERAPY: Primary | ICD-10-CM

## 2019-09-24 RX ORDER — SULFASALAZINE 500 MG/1
TABLET, DELAYED RELEASE ORAL
Qty: 180 TABLET | Refills: 0 | Status: SHIPPED | OUTPATIENT
Start: 2019-09-24 | End: 2019-09-24 | Stop reason: SDUPTHER

## 2019-09-24 RX ORDER — SULFASALAZINE 500 MG/1
TABLET, DELAYED RELEASE ORAL
Qty: 540 TABLET | Refills: 0 | Status: SHIPPED | OUTPATIENT
Start: 2019-09-24 | End: 2019-10-07 | Stop reason: SDUPTHER

## 2019-09-24 NOTE — TELEPHONE ENCOUNTER
Please schedule overdue standing labs this week so sulfsalazine can be refilled. Also overdue for f/u with Dr. Reed and myself. Thanks YEN

## 2019-09-25 ENCOUNTER — TELEPHONE (OUTPATIENT)
Dept: OPTOMETRY | Facility: CLINIC | Age: 64
End: 2019-09-25

## 2019-09-25 NOTE — H&P
History    Chief complaint:  Painless progressive vision loss    Present Ilness/Diagnosis: Nuclear sclerotic Cataract    Past Medical History:  has a past medical history of Allergy, Amblyopia, Anemia, Arthritis (02/02/1992), Cataract, Depression, Dry eyes, Dry mouth, Fibromyalgia (4/17/2014), Fibromyalgia, GERD (gastroesophageal reflux disease), History of psychiatric hospitalization, Hyperlipidemia (02/02/1992), Hypertension, Kidney stone, Migraine headache, Osteoporosis, Psoriatic arthritis (02/02/1992), Right knee pain, RLS (restless legs syndrome), Schizophrenia (02/02/1992), and Urinary tract infection.    Family History/Social History: refer to chart    Allergies:   Review of patient's allergies indicates:   Allergen Reactions    Etanercept Other (See Comments)     Other reaction(s): recurrent infections    Chloramphenicol sod succinate Hives and Other (See Comments)    Codeine Other (See Comments)     Other reaction(s): Stomach upset    Nickel sutures [surgical stainless steel] Dermatitis     Allergic contact dermatitis    Adhesive Rash     Other reaction(s): Rash       Current Medications: No current facility-administered medications for this encounter.     Current Outpatient Medications:     albuterol (PROVENTIL/VENTOLIN HFA) 90 mcg/actuation inhaler, Inhale 2 puffs into the lungs every 6 (six) hours as needed for Wheezing., Disp: 1 each, Rfl: 11    albuterol (VENTOLIN HFA) 90 mcg/actuation inhaler, Inhale 2 puffs into the lungs every 6 (six) hours as needed for Wheezing. Rescue, Disp: 1 each, Rfl: 11    amLODIPine (NORVASC) 5 MG tablet, Take 1 tablet (5 mg total) by mouth once daily., Disp: 30 tablet, Rfl: 0    aspirin 325 MG tablet, Take 325 mg by mouth once daily., Disp: , Rfl:     atorvastatin (LIPITOR) 40 MG tablet, TAKE 1 TABLET(40 MG) BY MOUTH EVERY DAY, Disp: 90 tablet, Rfl: 3    baclofen (LIORESAL) 20 MG tablet, Take 1 tablet (20 mg total) by mouth 3 (three) times daily. (Patient taking  differently: Take 20 mg by mouth 3 (three) times daily as needed. ), Disp: 90 tablet, Rfl: 11    diclofenac sodium (VOLTAREN) 1 % Gel, Apply 2 g topically once daily., Disp: 1 Tube, Rfl: 3    gabapentin (NEURONTIN) 300 MG capsule, Take 1 capsule (300 mg total) by mouth every evening., Disp: 60 capsule, Rfl: 5    hydroCHLOROthiazide (HYDRODIURIL) 25 MG tablet, TAKE 1 TABLET(25 MG) BY MOUTH EVERY DAY (Patient taking differently: TAKE 1 TABLET(25 MG) BY MOUTH DAILY AS NEEDED FOR LLE SWELLING), Disp: 30 tablet, Rfl: 11    ketorolac 0.4% (ACULAR) 0.4 % Drop, Place 1 drop into the right eye 3 (three) times daily., Disp: , Rfl:     lisinopril 10 MG tablet, Take 10 mg by mouth daily as needed. , Disp: , Rfl:     ofloxacin (OCUFLOX) 0.3 % ophthalmic solution, Place 1 drop into the right eye 3 (three) times daily., Disp: , Rfl:     prednisoLONE acetate (PRED FORTE) 1 % DrpS, Place 1 drop into the right eye 3 (three) times daily., Disp: , Rfl:     risperiDONE (RISPERDAL) 2 MG tablet, Take 1 tablet (2 mg total) by mouth every evening., Disp: 90 tablet, Rfl: 3    risperiDONE (RISPERDAL) 4 MG tablet, Take 1 tablet (4 mg total) by mouth every morning., Disp: 90 tablet, Rfl: 3    secukinumab (COSENTYX PEN) 150 mg/mL PnIj, Inject 150 mg into the skin every 4 weeks, Disp: 1 mL, Rfl: 0    sulfaSALAzine (AZULFIDINE) 500 MG TbEC, TAKE 3 TABLETS(1500 MG) BY MOUTH TWICE DAILY, Disp: 540 tablet, Rfl: 0    traMADol (ULTRAM) 50 mg tablet, TAKE 1 TABLET BY MOUTH EVERY 12 HOURS AS NEEDED FOR PAIN, Disp: 60 tablet, Rfl: 0    venlafaxine (EFFEXOR-XR) 150 MG Cp24, Take 1 capsule (150 mg total) by mouth once daily., Disp: 30 capsule, Rfl: 11    Facility-Administered Medications Ordered in Other Encounters:     phenylephrine HCL 2.5% ophthalmic solution 1 drop, 1 drop, Right Eye, On Call Procedure, Karen Song MD, 1 drop at 08/01/19 0648    proparacaine 0.5 % ophthalmic solution 1 drop, 1 drop, Right Eye, On Call Procedure,  Karen Song MD, 1 drop at 08/01/19 0638    tropicamide 1% ophthalmic solution 1 drop, 1 drop, Right Eye, On Call Procedure, Karen Song MD, 1 drop at 08/01/19 0648    Physical Exam    BP: Vital signs stable  General: No apparent distress  HEENT: nuclear sclerotic cataract  Lungs: adequate respirations  Heart: + pulses  Abdomen: soft  Rectal/pelvic: deferred    Impression: Visually significant Cataract    Plan: Phacoemulsification with implantation of Intraocular lens

## 2019-09-26 ENCOUNTER — ANESTHESIA (OUTPATIENT)
Dept: SURGERY | Facility: HOSPITAL | Age: 64
End: 2019-09-26
Payer: MEDICARE

## 2019-09-26 ENCOUNTER — ANESTHESIA EVENT (OUTPATIENT)
Dept: SURGERY | Facility: HOSPITAL | Age: 64
End: 2019-09-26
Payer: MEDICARE

## 2019-09-26 ENCOUNTER — HOSPITAL ENCOUNTER (OUTPATIENT)
Facility: HOSPITAL | Age: 64
Discharge: HOME | End: 2019-09-26
Attending: OPHTHALMOLOGY | Admitting: OPHTHALMOLOGY
Payer: MEDICARE

## 2019-09-26 VITALS
TEMPERATURE: 98 F | SYSTOLIC BLOOD PRESSURE: 154 MMHG | OXYGEN SATURATION: 93 % | HEIGHT: 62 IN | DIASTOLIC BLOOD PRESSURE: 74 MMHG | HEART RATE: 84 BPM | WEIGHT: 104 LBS | BODY MASS INDEX: 19.14 KG/M2 | RESPIRATION RATE: 16 BRPM

## 2019-09-26 DIAGNOSIS — H25.10 NUCLEAR SENILE CATARACT, UNSPECIFIED LATERALITY: Primary | ICD-10-CM

## 2019-09-26 DIAGNOSIS — H25.10 SENILE NUCLEAR SCLEROSIS: ICD-10-CM

## 2019-09-26 PROCEDURE — 66984 XCAPSL CTRC RMVL W/O ECP: CPT | Mod: 79,LT,, | Performed by: OPHTHALMOLOGY

## 2019-09-26 PROCEDURE — 37000009 HC ANESTHESIA EA ADD 15 MINS: Performed by: OPHTHALMOLOGY

## 2019-09-26 PROCEDURE — 25000003 PHARM REV CODE 250: Performed by: OPHTHALMOLOGY

## 2019-09-26 PROCEDURE — D9220A PRA ANESTHESIA: Mod: ANES,,, | Performed by: ANESTHESIOLOGY

## 2019-09-26 PROCEDURE — 36000706: Performed by: OPHTHALMOLOGY

## 2019-09-26 PROCEDURE — V2632 POST CHMBR INTRAOCULAR LENS: HCPCS | Performed by: OPHTHALMOLOGY

## 2019-09-26 PROCEDURE — C9447 INJ, PHENYLEPHRINE KETOROLAC: HCPCS | Performed by: OPHTHALMOLOGY

## 2019-09-26 PROCEDURE — 71000015 HC POSTOP RECOV 1ST HR: Performed by: OPHTHALMOLOGY

## 2019-09-26 PROCEDURE — 25000003 PHARM REV CODE 250

## 2019-09-26 PROCEDURE — D9220A PRA ANESTHESIA: ICD-10-PCS | Mod: ANES,,, | Performed by: ANESTHESIOLOGY

## 2019-09-26 PROCEDURE — D9220A PRA ANESTHESIA: ICD-10-PCS | Mod: CRNA,,, | Performed by: NURSE ANESTHETIST, CERTIFIED REGISTERED

## 2019-09-26 PROCEDURE — 63600175 PHARM REV CODE 636 W HCPCS: Performed by: OPHTHALMOLOGY

## 2019-09-26 PROCEDURE — 37000008 HC ANESTHESIA 1ST 15 MINUTES: Performed by: OPHTHALMOLOGY

## 2019-09-26 PROCEDURE — 63600175 PHARM REV CODE 636 W HCPCS: Performed by: NURSE ANESTHETIST, CERTIFIED REGISTERED

## 2019-09-26 PROCEDURE — 66984 PR REMOVAL, CATARACT, W/INSRT INTRAOC LENS, W/O ENDO CYCLO: ICD-10-PCS | Mod: 79,LT,, | Performed by: OPHTHALMOLOGY

## 2019-09-26 PROCEDURE — 36000707: Performed by: OPHTHALMOLOGY

## 2019-09-26 PROCEDURE — D9220A PRA ANESTHESIA: Mod: CRNA,,, | Performed by: NURSE ANESTHETIST, CERTIFIED REGISTERED

## 2019-09-26 DEVICE — LENS IOL ITEC PRELOAD 15.0D: Type: IMPLANTABLE DEVICE | Site: EYE | Status: FUNCTIONAL

## 2019-09-26 RX ORDER — ACETAMINOPHEN 325 MG/1
650 TABLET ORAL EVERY 4 HOURS PRN
Status: DISCONTINUED | OUTPATIENT
Start: 2019-09-26 | End: 2019-09-26

## 2019-09-26 RX ORDER — LIDOCAINE HYDROCHLORIDE 10 MG/ML
INJECTION, SOLUTION EPIDURAL; INFILTRATION; INTRACAUDAL; PERINEURAL
Status: DISCONTINUED | OUTPATIENT
Start: 2019-09-26 | End: 2019-09-26 | Stop reason: HOSPADM

## 2019-09-26 RX ORDER — PREDNISOLONE ACETATE 10 MG/ML
SUSPENSION/ DROPS OPHTHALMIC
Status: DISCONTINUED
Start: 2019-09-26 | End: 2019-09-26 | Stop reason: HOSPADM

## 2019-09-26 RX ORDER — SODIUM CHLORIDE 0.9 % (FLUSH) 0.9 %
10 SYRINGE (ML) INJECTION
Status: DISCONTINUED | OUTPATIENT
Start: 2019-09-26 | End: 2019-09-26 | Stop reason: HOSPADM

## 2019-09-26 RX ORDER — ACETAMINOPHEN 325 MG/1
650 TABLET ORAL EVERY 4 HOURS PRN
Status: DISCONTINUED | OUTPATIENT
Start: 2019-09-26 | End: 2019-09-26 | Stop reason: HOSPADM

## 2019-09-26 RX ORDER — LIDOCAINE HYDROCHLORIDE 40 MG/ML
INJECTION, SOLUTION RETROBULBAR
Status: DISCONTINUED | OUTPATIENT
Start: 2019-09-26 | End: 2019-09-26 | Stop reason: HOSPADM

## 2019-09-26 RX ORDER — PREDNISOLONE ACETATE 10 MG/ML
SUSPENSION/ DROPS OPHTHALMIC
Status: DISCONTINUED | OUTPATIENT
Start: 2019-09-26 | End: 2019-09-26 | Stop reason: HOSPADM

## 2019-09-26 RX ORDER — MIDAZOLAM HYDROCHLORIDE 1 MG/ML
INJECTION, SOLUTION INTRAMUSCULAR; INTRAVENOUS
Status: DISCONTINUED | OUTPATIENT
Start: 2019-09-26 | End: 2019-09-26

## 2019-09-26 RX ORDER — MOXIFLOXACIN 5 MG/ML
SOLUTION/ DROPS OPHTHALMIC
Status: DISCONTINUED | OUTPATIENT
Start: 2019-09-26 | End: 2019-09-26 | Stop reason: HOSPADM

## 2019-09-26 RX ORDER — LIDOCAINE HYDROCHLORIDE 10 MG/ML
INJECTION, SOLUTION EPIDURAL; INFILTRATION; INTRACAUDAL; PERINEURAL
Status: DISCONTINUED
Start: 2019-09-26 | End: 2019-09-26 | Stop reason: HOSPADM

## 2019-09-26 RX ORDER — PROPARACAINE HYDROCHLORIDE 5 MG/ML
1 SOLUTION/ DROPS OPHTHALMIC
Status: DISCONTINUED | OUTPATIENT
Start: 2019-09-26 | End: 2019-09-26 | Stop reason: HOSPADM

## 2019-09-26 RX ORDER — SODIUM CHLORIDE 9 MG/ML
INJECTION, SOLUTION INTRAVENOUS CONTINUOUS
Status: DISCONTINUED | OUTPATIENT
Start: 2019-09-26 | End: 2019-09-26 | Stop reason: HOSPADM

## 2019-09-26 RX ORDER — TROPICAMIDE 10 MG/ML
1 SOLUTION/ DROPS OPHTHALMIC
Status: DISCONTINUED | OUTPATIENT
Start: 2019-09-26 | End: 2019-09-26 | Stop reason: HOSPADM

## 2019-09-26 RX ORDER — KETOROLAC TROMETHAMINE 5 MG/ML
1 SOLUTION OPHTHALMIC ONCE
Status: COMPLETED | OUTPATIENT
Start: 2019-09-26 | End: 2019-09-26

## 2019-09-26 RX ORDER — PHENYLEPHRINE HYDROCHLORIDE 25 MG/ML
1 SOLUTION/ DROPS OPHTHALMIC
Status: DISCONTINUED | OUTPATIENT
Start: 2019-09-26 | End: 2019-09-26 | Stop reason: HOSPADM

## 2019-09-26 RX ORDER — LIDOCAINE HYDROCHLORIDE 40 MG/ML
INJECTION, SOLUTION RETROBULBAR
Status: DISCONTINUED
Start: 2019-09-26 | End: 2019-09-26 | Stop reason: HOSPADM

## 2019-09-26 RX ORDER — MOXIFLOXACIN 5 MG/ML
1 SOLUTION/ DROPS OPHTHALMIC
Status: COMPLETED | OUTPATIENT
Start: 2019-09-26 | End: 2019-09-26

## 2019-09-26 RX ADMIN — PHENYLEPHRINE HYDROCHLORIDE 1 DROP: 2.5 SOLUTION/ DROPS OPHTHALMIC at 08:09

## 2019-09-26 RX ADMIN — SODIUM CHLORIDE 1000 ML: 0.9 INJECTION, SOLUTION INTRAVENOUS at 08:09

## 2019-09-26 RX ADMIN — PROPARACAINE HYDROCHLORIDE 1 DROP: 5 SOLUTION/ DROPS OPHTHALMIC at 08:09

## 2019-09-26 RX ADMIN — TROPICAMIDE 1 DROP: 10 SOLUTION/ DROPS OPHTHALMIC at 08:09

## 2019-09-26 RX ADMIN — MOXIFLOXACIN HYDROCHLORIDE 1 DROP: 5 SOLUTION/ DROPS OPHTHALMIC at 08:09

## 2019-09-26 RX ADMIN — MIDAZOLAM HYDROCHLORIDE 2 MG: 1 INJECTION, SOLUTION INTRAMUSCULAR; INTRAVENOUS at 09:09

## 2019-09-26 RX ADMIN — KETOROLAC TROMETHAMINE 1 DROP: 5 SOLUTION OPHTHALMIC at 08:09

## 2019-09-26 NOTE — ANESTHESIA RELEASE NOTE
"Anesthesia Release from PACU Note    Patient: Bhavna Landry    Procedure(s) Performed: Procedure(s) (LRB):  PHACOEMULSIFICATION, CATARACT (Left)  INSERTION, IOL PROSTHESIS (Left)    Anesthesia type: general    Post pain: Adequate analgesia    Post assessment: no apparent anesthetic complications and tolerated procedure well    Last Vitals:   Visit Vitals  BP (!) 154/74   Pulse 84   Temp 36.6 °C (97.9 °F) (Temporal)   Resp 16   Ht 5' 2" (1.575 m)   Wt 47.2 kg (104 lb)   SpO2 (!) 93%   BMI 19.02 kg/m²       Post vital signs: stable    Level of consciousness: awake, alert  and oriented    Nausea/Vomiting: no nausea/no vomiting    Complications: none    Airway Patency: patent    Respiratory: unassisted, spontaneous ventilation, room air    Cardiovascular: stable and blood pressure at baseline    Hydration: euvolemic  "

## 2019-09-26 NOTE — DISCHARGE INSTRUCTIONS
Recovery After Procedural Sedation (Adult)  You have been given medicine by vein to make you sleep during your surgery. This may have included both a pain medicine and sleeping medicine. Most of the effects have worn off. But you may still have some drowsiness for the next 6 to 8 hours.  Home care  Follow these guidelines when you get home:  · For the next 8 hours, you should be watched by a responsible adult. This person should make sure your condition is not getting worse.  · Don't drink any alcohol for the next 24 hours.  · Don't drive, operate dangerous machinery, or make important business or personal decisions during the next 24 hours.  Note: Your healthcare provider may tell you not to take any medicine by mouth for pain or sleep in the next 4 hours. These medicines may react with the medicines you were given in the hospital. This could cause a much stronger response than usual.  Follow-up care  Follow up with your healthcare provider if you are not alert and back to your usual level of activity within 12 hours.  When to seek medical advice  Call your healthcare provider right away if any of these occur:  · Drowsiness gets worse  · Weakness or dizziness gets worse  · Repeated vomiting  · You can't be awakened   Date Last Reviewed: 10/18/2016  © 5697-0241 [employer]. [address] [place] [address], [place] [address]. All rights reserved. This information is not intended as a substitute for professional medical care. Always follow your healthcare professional's instructions.

## 2019-09-26 NOTE — ANESTHESIA POSTPROCEDURE EVALUATION
Anesthesia Post Evaluation    Patient: Bhavna Landry    Procedure(s) Performed: Procedure(s) (LRB):  PHACOEMULSIFICATION, CATARACT (Left)  INSERTION, IOL PROSTHESIS (Left)    Final Anesthesia Type: MAC  Patient location during evaluation: PACU  Patient participation: Yes- Able to Participate  Level of consciousness: awake and alert  Post-procedure vital signs: reviewed and stable  Pain management: adequate  Airway patency: patent  PONV status at discharge: No PONV  Anesthetic complications: no      Cardiovascular status: blood pressure returned to baseline  Respiratory status: unassisted, spontaneous ventilation and room air  Hydration status: euvolemic            Vitals Value Taken Time   /74 9/26/2019  9:46 AM   Temp 36.6 °C (97.9 °F) 9/26/2019  9:35 AM   Pulse 82 9/26/2019  9:48 AM   Resp 16 9/26/2019  9:45 AM   SpO2 93 % 9/26/2019  9:48 AM   Vitals shown include unvalidated device data.      No case tracking events are documented in the log.      Pain/Azra Score: Azra Score: 10 (9/26/2019  9:33 AM)

## 2019-09-26 NOTE — OP NOTE
DATE OF PROCEDURE: 09/26/2019    SURGEON: DONALDO PADGETT MD    PREOPERATIVE DIAGNOSIS:  Senile nuclear sclerotic cataract left eye.     POSTOPERATIVE DIAGNOSIS: Senile nuclear sclerotic cataract left eye.     PROCEDURE PERFORMED:  Phacoemulsification with placement of intraocular lens, left eye.    IMPLANT:  PCBOO 15.0    ANESTHESIA:  Topical and MAC    COMPLICATIONS: none    ESTIMATED BLOOD LOSS: <1cc    SPECIMENS: none    INDICATIONS FOR PROCEDURE:  This patient presented to the clinic with decreased vision in the left eye and was found to have a cataract.  The risks, benefits, and alternatives were discussed and the patient agreed to proceed with phacoemulsification and implantation of a lens in the left eye.     PROCEDURE IN DETAIL:  The patient was met in the preop holding area.  Consent was confirmed to be signed.  The operative site was marked.  The patient was brought into the operating room by the anesthesia team and placed under monitored anesthesia care.  The left eye was prepped and draped in a sterile ophthalmic fashion.  A Alvin speculum was placed into the left eye.   A paracentesis site was made and 1% preservative-free lidocaine was injected into the anterior chamber.  Viscoelastic  material was injected into the anterior chamber.  A keratome blade was used to make a clear corneal incision.  A cystotome was used to initiate the continuous curvilinear capsulorrhexis which was completed with Utrata forceps.  BSS on a davis cannula was used to perform hydrodissection.  The phacoemulsification tip was introduced into the eye and the nucleus was removed in a standard divide-and-conquer fashion.  Remaining cortical material was removed from the eye using irrigation-aspiration.  The capsular bag was filled with viscoelastic material and the intraocular lens was injected and positioned into place. Remaining viscoelastic material was removed from the eye using irrigation and aspiration.  The corneal  wounds were hydrated.  The eye was filled to physiologic pressure. The wounds were found to be watertight. Drops of Vigamox and prednisilone were placed into the eye.  The eye was washed, dried, and shielded.  The patient tolerated the procedure well and knows to follow up with me tomorrow morning, sooner if needed.

## 2019-09-26 NOTE — ANESTHESIA PREPROCEDURE EVALUATION
09/26/2019  Bhavna Landry is a 64 y.o., female.  Pre-operative evaluation for Procedure(s) (LRB):  PHACOEMULSIFICATION, CATARACT (Left)  INSERTION, IOL PROSTHESIS (Left)    Bhavna Landry is a 64 y.o. female     Patient Active Problem List   Diagnosis    PSA (psoriatic arthritis)    Osteoarthritis    Low back pain    Essential hypertension    Hyperlipidemia    Occlusion and stenosis of carotid artery without mention of cerebral infarction    Schizophrenia    Insomnia    Extrapyramidal syndrome    Migraine without aura and without status migrainosus, not intractable    Total knee replacement status    Nuclear sclerosis - Both Eyes    Duane's syndrome of right eye    Posterior vitreous detachment, both eyes    Retinal hemorrhage, left    Retinal tear    Syncope    Fibromyalgia    History of nickel allergy    Exposure to nickel dust    Right knee DJD    Internal derangement of right knee    S/P R knee surgery, TKA revision    Tibial fracture    Knee pain, right    Osteopenia    Right knee pain    Pain of right tibia    Scalp laceration    Right-sided chest wall pain    Brow ptosis    Recurrent major depressive disorder, in partial remission    History of extrapyramidal symptoms    Sacroiliac joint dysfunction    Decreased ROM of lumbar spine    Decreased strength    Acute right-sided low back pain without sciatica    Chronic pain    Myofascial pain syndrome    Tobacco use disorder    Restless legs    Medication monitoring encounter    Osteoporosis, post-menopausal    Senile nuclear sclerosis       Review of patient's allergies indicates:   Allergen Reactions    Etanercept Other (See Comments)     Other reaction(s): recurrent infections    Chloramphenicol sod succinate Hives and Other (See Comments)    Codeine Other (See Comments)     Other reaction(s): Stomach  upset    Nickel sutures [surgical stainless steel] Dermatitis     Allergic contact dermatitis    Adhesive Rash     Other reaction(s): Rash       Current Facility-Administered Medications on File Prior to Encounter   Medication Dose Route Frequency Provider Last Rate Last Dose    phenylephrine HCL 2.5% ophthalmic solution 1 drop  1 drop Right Eye On Call Procedure Karen Song MD   1 drop at 08/01/19 0648    proparacaine 0.5 % ophthalmic solution 1 drop  1 drop Right Eye On Call Procedure Karen Song MD   1 drop at 08/01/19 0638    tropicamide 1% ophthalmic solution 1 drop  1 drop Right Eye On Call Procedure Karen Song MD   1 drop at 08/01/19 0648     Current Outpatient Medications on File Prior to Encounter   Medication Sig Dispense Refill    albuterol (PROVENTIL/VENTOLIN HFA) 90 mcg/actuation inhaler Inhale 2 puffs into the lungs every 6 (six) hours as needed for Wheezing. 1 each 11    albuterol (VENTOLIN HFA) 90 mcg/actuation inhaler Inhale 2 puffs into the lungs every 6 (six) hours as needed for Wheezing. Rescue 1 each 11    amLODIPine (NORVASC) 5 MG tablet Take 1 tablet (5 mg total) by mouth once daily. 30 tablet 0    aspirin 325 MG tablet Take 325 mg by mouth once daily.      atorvastatin (LIPITOR) 40 MG tablet TAKE 1 TABLET(40 MG) BY MOUTH EVERY DAY 90 tablet 3    baclofen (LIORESAL) 20 MG tablet Take 1 tablet (20 mg total) by mouth 3 (three) times daily. (Patient taking differently: Take 20 mg by mouth 3 (three) times daily as needed. ) 90 tablet 11    diclofenac sodium (VOLTAREN) 1 % Gel Apply 2 g topically once daily. 1 Tube 3    gabapentin (NEURONTIN) 300 MG capsule Take 1 capsule (300 mg total) by mouth every evening. 60 capsule 5    hydroCHLOROthiazide (HYDRODIURIL) 25 MG tablet TAKE 1 TABLET(25 MG) BY MOUTH EVERY DAY (Patient taking differently: TAKE 1 TABLET(25 MG) BY MOUTH DAILY AS NEEDED FOR LLE SWELLING) 30 tablet 11    lisinopril 10 MG tablet Take 10 mg by mouth  daily as needed.       risperiDONE (RISPERDAL) 2 MG tablet Take 1 tablet (2 mg total) by mouth every evening. 90 tablet 3    risperiDONE (RISPERDAL) 4 MG tablet Take 1 tablet (4 mg total) by mouth every morning. 90 tablet 3    secukinumab (COSENTYX PEN) 150 mg/mL PnIj Inject 150 mg into the skin every 4 weeks 1 mL 0    venlafaxine (EFFEXOR-XR) 150 MG Cp24 Take 1 capsule (150 mg total) by mouth once daily. 30 capsule 11       Past Surgical History:   Procedure Laterality Date    brow ptosis repair Right 2019    Surgeon: Dr. Karla Henson    CATARACT EXTRACTION       SECTION      COLONOSCOPY N/A 2016    Procedure: COLONOSCOPY;  Surgeon: ANDRIA Connelly MD;  Location: [address];  Service: Endoscopy;  Laterality: N/A;    cyst removed from right sinus  1982    HYSTERECTOMY      VAGINAL HYSTERECTOMY WITHOUT BSO - ENDOMETRIOSIS    INJECTION OF ANESTHETIC AGENT AROUND MEDIAL BRANCH NERVES INNERVATING LUMBAR FACET JOINT N/A 2019    Procedure: Lumbo-sacral Block, DR5 and Lateral Branches of S1,S2, S3;  Surgeon: Michelle Pineda Jr., MD;  [facility] [place];  Service: Pain Management;  Laterality: N/A;  Pt takes  and states she holds ASA on her own whenever she has procedures.  Instructed to hold x 3 days prior to procedure.      INJECTION OF ANESTHETIC AGENT INTO SACROILIAC JOINT Right 2018    Procedure: BLOCK, SACROILIAC JOINT-Right- ORAL SEDATION;  Surgeon: Michlele Pineda Jr., MD;  Location: [facility] [place];  Service: Pain Management;  Laterality: Right;    INJECTION OF ANESTHETIC AGENT INTO SACROILIAC JOINT Bilateral 2018    Procedure: BLOCK, SACROILIAC JOINT-BILATERAL;  Surgeon: Michelle Pineda Jr., MD;  Location: [facility] [place];  Service: Pain Management;  Laterality: Bilateral;  Please keep at 10:00 due to trasnsportation    INJECTION OF ANESTHETIC AGENT INTO SACROILIAC JOINT Bilateral 2019    Procedure: Bilateral Sacroiliac Joint Injection  - Per Dr Pineda, not necessary to hold ASA.;  Surgeon: Michelle Pineda Jr., MD;  Location: [facility];  Service: Pain Management;  Laterality: Bilateral;    INTRAOCULAR PROSTHESES INSERTION Right 8/1/2019    Procedure: INSERTION, IOL PROSTHESIS;  Surgeon: Karen Song MD;  Location: [facility] OR [address] [place] [address];  Service: Ophthalmology;  Laterality: Right;    JOINT REPLACEMENT Right     knee    KNEE SURGERY      PHACOEMULSIFICATION OF CATARACT Right 8/1/2019    Procedure: PHACOEMULSIFICATION, CATARACT;  Surgeon: Karen Song MD;  Location: [facility] OR [address] [place] [address];  Service: Ophthalmology;  Laterality: Right;    RADIOFREQUENCY THERMOCOAGULATION Right 5/7/2019    Procedure: RADIOFREQUENCY THERMAL COAGULATION RIGHT DORSAL RAMUS 5 AND LATERAL BRANCH OF S1, S2 AND S3;  Surgeon: Michelle Pineda Jr., MD;  Location: [facility];  Service: Pain Management;  Laterality: Right;    RADIOFREQUENCY THERMOCOAGULATION Left 5/14/2019    Procedure: RADIOFREQUENCY THERMAL COAGULATION LEFT DORSAL RAMUS 5 AND LATERAL BRANCH OF S1,S2 AND S3;  Surgeon: Michelle Pineda Jr., MD;  Location: [facility];  Service: Pain Management;  Laterality: Left;    Surgery on right knee  07/09/1982    tumor removed from back left side upper shoulder  03/17/2006       Social History     Socioeconomic History    Marital status:      Spouse name: Not on file    Number of children: 1    Years of education: Not on file    Highest education level: Not on file   Occupational History    Occupation: retired asst  [employer] [address].     Employer: [employer] 2010   Social Needs    Financial resource strain: Not on file    Food insecurity:     Worry: Not on file     Inability: Not on file    Transportation needs:     Medical: Not on file     Non-medical: Not on file   Tobacco Use    Smoking status: Former Smoker     Packs/day: 0.50     Years: 10.00     Pack years: 5.00     Types: Cigarettes     Last attempt to quit: 5/19/2012     Years  since quittin.3    Smokeless tobacco: Former User    Tobacco comment: stopped smoking 2017.     Substance and Sexual Activity    Alcohol use: No    Drug use: No    Sexual activity: Never     Partners: Male     Birth control/protection: Post-menopausal   Lifestyle    Physical activity:     Days per week: Not on file     Minutes per session: Not on file    Stress: Not on file   Relationships    Social connections:     Talks on phone: Not on file     Gets together: Not on file     Attends [employer] service: Not on file     Active member of club or organization: Not on file     Attends meetings of clubs or organizations: Not on file     Relationship status: Not on file   Other Topics Concern    Patient feels they ought to cut down on drinking/drug use Not Asked    Patient annoyed by others criticizing their drinking/drug use Not Asked    Patient has felt bad or guilty about drinking/drug use Not Asked    Patient has had a drink/used drugs as an eye opener in the AM Not Asked    Are you pregnant or think you may be? Not Asked    Breast-feeding Not Asked   Social History Narrative    Exercise:  Childcare.        Ett:  3 days a week        Daughter and her 3 kids live with her.             CBC: No results for input(s): WBC, RBC, HGB, HCT, PLT, MCV, MCH, MCHC in the last 72 hours.    CMP: No results for input(s): NA, K, CL, CO2, BUN, CREATININE, GLU, MG, PHOS, CALCIUM, ALBUMIN, PROT, ALKPHOS, ALT, AST, BILITOT in the last 72 hours.    INR  No results for input(s): PT, INR, PROTIME, APTT in the last 72 hours.        Diagnostic Studies:      EKD Echo:  Results for orders placed or performed during the hospital encounter of 14   2D echo with color flow doppler   Result Value Ref Range    QEF 65     Diastolic Dysfunction No      Last 3 sets of Vitals    Vitals - 1 value per visit 2019   SYSTOLIC - 110 -   DIASTOLIC - 62 -   PULSE - 74 -   TEMPERATURE - - -   RESPIRATIONS -  "- -   SPO2 - 98 -   Weight (lb) - 103.62 -   Weight (kg) - 47 -   HEIGHT - 5' 2" -   BODY MASS INDEX - 18.95 -   VISIT REPORT - - -   Pain Score  0 0 5   Some encounter information is confidential and restricted. Go to Review Flowsheets activity to see all data.   Some recent data might be hidden       Pre-op Assessment    I have reviewed the Patient Summary Reports.     I have reviewed the Nursing Notes.   I have reviewed the Medications.     Review of Systems  Anesthesia Hx:  No problems with previous Anesthesia  History of prior surgery of interest to airway management or planning: Denies Family Hx of Anesthesia complications.   Denies Personal Hx of Anesthesia complications.   Cardiovascular:   Hypertension hyperlipidemia ECG has been reviewed.    Pulmonary:  Pulmonary Normal    Renal/:   renal calculi    Hepatic/GI:   GERD    Musculoskeletal:   Arthritis     Neurological:   Headaches    Psych:   depression          Physical Exam  General:  Well nourished    Airway/Jaw/Neck:  Airway Findings: Mouth Opening: Normal Tongue: Normal  General Airway Assessment: Adult  Mallampati: II  Improves to I with phonation.  TM Distance: Normal, at least 6 cm  Jaw/Neck Findings:  Micrognathia: Negative Neck ROM: Normal ROM      Dental:  Dental Findings: In tact   Chest/Lungs:  Chest/Lungs Findings: Clear to auscultation, Normal Respiratory Rate     Heart/Vascular:  Heart Findings: Rate: Normal  Rhythm: Regular Rhythm  Sounds: Normal  Heart murmur: negative    Abdomen:  Abdomen Findings:  Normal, Nontender, Soft       Mental Status:  Mental Status Findings:  Cooperative, Alert and Oriented         Anesthesia Plan  Type of Anesthesia, risks & benefits discussed:  Anesthesia Type:  MAC, general  Patient's Preference: same   Intra-op Monitoring Plan: standard ASA monitors  Intra-op Monitoring Plan Comments:   Post Op Pain Control Plan: per primary service following discharge from PACU  Post Op Pain Control Plan Comments: "   Induction:   IV  Beta Blocker:  Patient is not currently on a Beta-Blocker (No further documentation required).       Informed Consent: Patient understands risks and agrees with Anesthesia plan.  Questions answered. Anesthesia consent signed with patient.  ASA Score: 3     Day of Surgery Review of History & Physical:    H&P update referred to the provider.         Ready For Surgery From Anesthesia Perspective.

## 2019-09-26 NOTE — TRANSFER OF CARE
"Anesthesia Transfer of Care Note    Patient: Bhavna Landry    Procedure(s) Performed: Procedure(s) (LRB):  PHACOEMULSIFICATION, CATARACT (Left)  INSERTION, IOL PROSTHESIS (Left)    Patient location: PACU    Anesthesia Type: MAC    Transport from OR: Transported from OR on room air with adequate spontaneous ventilation    Post pain: adequate analgesia    Post assessment: no apparent anesthetic complications    Post vital signs: stable    Level of consciousness: awake    Complications: none    Transfer of care protocol was followed      Last vitals:   Visit Vitals  BP (!) 171/74 (BP Location: Left arm, Patient Position: Lying)   Pulse 83   Temp 36.6 °C (97.9 °F) (Oral)   Resp 16   Ht 5' 2" (1.575 m)   Wt 47.2 kg (104 lb)   SpO2 97%   BMI 19.02 kg/m²     "

## 2019-09-26 NOTE — PROGRESS NOTES
Pt discharged to home.  Discharge instructions given, pt and daughter stated understanding.  Dressing to eye dry and intact, IV removed.  Pt left via wheelchair with daughter to home.

## 2019-09-27 ENCOUNTER — OFFICE VISIT (OUTPATIENT)
Dept: OPHTHALMOLOGY | Facility: CLINIC | Age: 64
End: 2019-09-27
Payer: MEDICARE

## 2019-09-27 DIAGNOSIS — Z98.42 STATUS POST CATARACT EXTRACTION AND INSERTION OF INTRAOCULAR LENS, LEFT: ICD-10-CM

## 2019-09-27 DIAGNOSIS — Z98.41 STATUS POST CATARACT EXTRACTION AND INSERTION OF INTRAOCULAR LENS, RIGHT: Primary | ICD-10-CM

## 2019-09-27 DIAGNOSIS — Z96.1 STATUS POST CATARACT EXTRACTION AND INSERTION OF INTRAOCULAR LENS, RIGHT: Primary | ICD-10-CM

## 2019-09-27 DIAGNOSIS — Z96.1 STATUS POST CATARACT EXTRACTION AND INSERTION OF INTRAOCULAR LENS, LEFT: ICD-10-CM

## 2019-09-27 PROCEDURE — 99999 PR PBB SHADOW E&M-EST. PATIENT-LVL III: ICD-10-PCS | Mod: PBBFAC,,, | Performed by: OPHTHALMOLOGY

## 2019-09-27 PROCEDURE — 99024 PR POST-OP FOLLOW-UP VISIT: ICD-10-PCS | Mod: S$GLB,,, | Performed by: OPHTHALMOLOGY

## 2019-09-27 PROCEDURE — 99999 PR PBB SHADOW E&M-EST. PATIENT-LVL III: CPT | Mod: PBBFAC,,, | Performed by: OPHTHALMOLOGY

## 2019-09-27 PROCEDURE — 99024 POSTOP FOLLOW-UP VISIT: CPT | Mod: S$GLB,,, | Performed by: OPHTHALMOLOGY

## 2019-09-29 NOTE — PROGRESS NOTES
HPI     Dr Roberts / sea / brendan/ mary    S/p phaco w/ IOL OS 9/26/19  s/p phaco w/IOL OD 8/1/19  Brow ptosis OD s/p repair - sea  Retinal tear of OD   Retinal hemorrhage OS   Duane's syndrome OD   PVD OU   +lazy eye OD  S/p cyst removed from right sinus (7/9/1982)    PF TID OS  Ocuflox TID OS  Ketorolac TID OS  AT's PRN OU     Pt here for 1 day post op OS. Patient states pain at about 5 this morning.   No other ocular complaints.    Last edited by Cheyanne Kovacs on 9/27/2019  9:33 AM. (History)            Assessment /Plan     For exam results, see Encounter Report.    Status post cataract extraction and insertion of intraocular lens, right    Status post cataract extraction and insertion of intraocular lens, left      S/p phaco w/ IOL OS 9/26/19  s/p phaco w/IOL OD 8/1/19    Slit Lamp Exam  L/L - normal  C/s - quiet  Cornea - clear  A/C - 1+ cell  Lens - PCIOL    POD #1 s/p phaco/IOL  - doing well  - continue the following drops:    vigamox or ocuflox TID x 1 wk then stop  Pred forte or durezol or dexamethasone TID x  4 wks  Ketorolac TID until runs out    Versus:    Combination drop - 1 drop TID x total of 1 month    Appropriate precautions and post op medications reviewed.  Patient instructed to call or come in if symptoms of redness, decreased vision, or pain are experienced.        Doing well, content with [facility] distance, readers for near.    F/up dr. Coello for 1 mo f/up, DFE, Mrx if needed.

## 2019-09-30 ENCOUNTER — OFFICE VISIT (OUTPATIENT)
Dept: INTERNAL MEDICINE | Facility: CLINIC | Age: 64
End: 2019-09-30
Payer: MEDICARE

## 2019-09-30 ENCOUNTER — PATIENT OUTREACH (OUTPATIENT)
Dept: ADMINISTRATIVE | Facility: OTHER | Age: 64
End: 2019-09-30

## 2019-09-30 VITALS
WEIGHT: 104.25 LBS | HEIGHT: 62 IN | DIASTOLIC BLOOD PRESSURE: 84 MMHG | SYSTOLIC BLOOD PRESSURE: 150 MMHG | BODY MASS INDEX: 19.19 KG/M2 | HEART RATE: 74 BPM | OXYGEN SATURATION: 98 %

## 2019-09-30 DIAGNOSIS — M54.9 BACK PAIN, UNSPECIFIED BACK LOCATION, UNSPECIFIED BACK PAIN LATERALITY, UNSPECIFIED CHRONICITY: ICD-10-CM

## 2019-09-30 DIAGNOSIS — M53.3 SACROILIAC DYSFUNCTION: ICD-10-CM

## 2019-09-30 DIAGNOSIS — G89.29 CHRONIC PAIN OF RIGHT KNEE: Primary | ICD-10-CM

## 2019-09-30 DIAGNOSIS — M25.561 CHRONIC PAIN OF RIGHT KNEE: Primary | ICD-10-CM

## 2019-09-30 DIAGNOSIS — I87.2 VENOUS INCOMPETENCE: ICD-10-CM

## 2019-09-30 DIAGNOSIS — M79.604 PAIN OF RIGHT LOWER EXTREMITY: ICD-10-CM

## 2019-09-30 PROCEDURE — 3077F SYST BP >= 140 MM HG: CPT | Mod: CPTII,S$GLB,, | Performed by: INTERNAL MEDICINE

## 2019-09-30 PROCEDURE — 3079F DIAST BP 80-89 MM HG: CPT | Mod: CPTII,S$GLB,, | Performed by: INTERNAL MEDICINE

## 2019-09-30 PROCEDURE — 99214 OFFICE O/P EST MOD 30 MIN: CPT | Mod: S$GLB,,, | Performed by: INTERNAL MEDICINE

## 2019-09-30 PROCEDURE — 99999 PR PBB SHADOW E&M-EST. PATIENT-LVL IV: CPT | Mod: PBBFAC,,, | Performed by: INTERNAL MEDICINE

## 2019-09-30 PROCEDURE — 3079F PR MOST RECENT DIASTOLIC BLOOD PRESSURE 80-89 MM HG: ICD-10-PCS | Mod: CPTII,S$GLB,, | Performed by: INTERNAL MEDICINE

## 2019-09-30 PROCEDURE — 1101F PR PT FALLS ASSESS DOC 0-1 FALLS W/OUT INJ PAST YR: ICD-10-PCS | Mod: CPTII,S$GLB,, | Performed by: INTERNAL MEDICINE

## 2019-09-30 PROCEDURE — 1101F PT FALLS ASSESS-DOCD LE1/YR: CPT | Mod: CPTII,S$GLB,, | Performed by: INTERNAL MEDICINE

## 2019-09-30 PROCEDURE — 99214 PR OFFICE/OUTPT VISIT, EST, LEVL IV, 30-39 MIN: ICD-10-PCS | Mod: S$GLB,,, | Performed by: INTERNAL MEDICINE

## 2019-09-30 PROCEDURE — 3077F PR MOST RECENT SYSTOLIC BLOOD PRESSURE >= 140 MM HG: ICD-10-PCS | Mod: CPTII,S$GLB,, | Performed by: INTERNAL MEDICINE

## 2019-09-30 PROCEDURE — 99999 PR PBB SHADOW E&M-EST. PATIENT-LVL IV: ICD-10-PCS | Mod: PBBFAC,,, | Performed by: INTERNAL MEDICINE

## 2019-09-30 NOTE — PROGRESS NOTES
Subjective:       Patient ID: Bhavna Landry is a 64 y.o. female.    Chief Complaint: Joint Swelling    HPI   C/o leg edema  - chronic.  Vasc u/s negative.  Compressive stockings helpful.    See august note.     S/o r knee replacement 2013.  C/o pain which she attributes to chino which was placed.  Pain makes it difficult to walk.   Wobbly at times.       C/o lower back pain radiating into buttocks.  She plans to f/u with Dr Pineda.   Ablation gave her pain relief for only a few weeks.  PT no effective.      Review of Systems   Constitutional: Negative for fever and unexpected weight change.   HENT: Negative for congestion and postnasal drip.    Eyes: Negative for pain, discharge and visual disturbance.   Respiratory: Negative for cough, chest tightness, shortness of breath and wheezing.    Cardiovascular: Negative for chest pain and leg swelling.   Gastrointestinal: Negative for abdominal pain, constipation, diarrhea and nausea.   Genitourinary: Negative for difficulty urinating, dysuria and hematuria.   Musculoskeletal: Positive for arthralgias and back pain.   Skin: Negative for rash.   Neurological: Negative for headaches.   Psychiatric/Behavioral: Negative for dysphoric mood and sleep disturbance. The patient is not nervous/anxious.        Objective:      Physical Exam   Constitutional: She is oriented to person, place, and time. She appears well-developed and well-nourished.   Musculoskeletal: She exhibits no edema.   Neurological: She is alert and oriented to person, place, and time.   Psychiatric: She has a normal mood and affect. Her behavior is normal.       Assessment:       1. Chronic pain of right knee    2. Pain of right lower extremity    3. Back pain, unspecified back location, unspecified back pain laterality, unspecified chronicity    4. Sacroiliac dysfunction    5. Venous incompetence        Plan:       Bhavna was seen today for joint swelling.    Diagnoses and all orders for this  visit:    Chronic pain of right knee  -     CANE FOR HOME USE    Pain of right lower extremity  -     CANE FOR HOME USE    Back pain, unspecified back location, unspecified back pain laterality, unspecified chronicity  -     Ambulatory consult to Physical Therapy                         [facility]  Sacroiliac dysfunction  -     Ambulatory consult to Physical Therapy    Venous incompetence       Temp handicapped license     Flu vax

## 2019-10-01 ENCOUNTER — TELEPHONE (OUTPATIENT)
Dept: INTERNAL MEDICINE | Facility: CLINIC | Age: 64
End: 2019-10-01

## 2019-10-01 DIAGNOSIS — E78.5 HYPERLIPIDEMIA, UNSPECIFIED HYPERLIPIDEMIA TYPE: Primary | ICD-10-CM

## 2019-10-01 DIAGNOSIS — Z79.899 ON SULFASALAZINE THERAPY: Primary | ICD-10-CM

## 2019-10-01 NOTE — TELEPHONE ENCOUNTER
----- Message from Marcia Reyes sent at 10/1/2019 12:01 PM CDT -----  Contact: self   Doctor appointment and lab have been scheduled.  Please link lab orders to the lab appointment.  Date of doctor appointment:  11/4  Date of lab appointment:  10/28  Physical or EP: Physical  Comments: Pt has a non fasting lab appt scheduled on 10/2 with orders linked

## 2019-10-01 NOTE — TELEPHONE ENCOUNTER
Looks like pt just had labs done by Dr. Cornelius. I pend the orders just in case. Please see message

## 2019-10-02 ENCOUNTER — LAB VISIT (OUTPATIENT)
Dept: LAB | Facility: HOSPITAL | Age: 64
End: 2019-10-02
Attending: INTERNAL MEDICINE
Payer: MEDICARE

## 2019-10-02 DIAGNOSIS — Z79.899 ENCOUNTER FOR MONITORING SULFASALAZINE THERAPY: ICD-10-CM

## 2019-10-02 DIAGNOSIS — Z51.81 ENCOUNTER FOR MONITORING SULFASALAZINE THERAPY: ICD-10-CM

## 2019-10-02 LAB
ALBUMIN SERPL BCP-MCNC: 3.9 G/DL (ref 3.5–5.2)
ALP SERPL-CCNC: 67 U/L (ref 55–135)
ALT SERPL W/O P-5'-P-CCNC: 15 U/L (ref 10–44)
ANION GAP SERPL CALC-SCNC: 10 MMOL/L (ref 8–16)
AST SERPL-CCNC: 23 U/L (ref 10–40)
BASOPHILS # BLD AUTO: 0.1 K/UL (ref 0–0.2)
BASOPHILS NFR BLD: 1.1 % (ref 0–1.9)
BILIRUB SERPL-MCNC: 0.3 MG/DL (ref 0.1–1)
BUN SERPL-MCNC: 7 MG/DL (ref 8–23)
CALCIUM SERPL-MCNC: 9.5 MG/DL (ref 8.7–10.5)
CHLORIDE SERPL-SCNC: 100 MMOL/L (ref 95–110)
CO2 SERPL-SCNC: 26 MMOL/L (ref 23–29)
CREAT SERPL-MCNC: 0.8 MG/DL (ref 0.5–1.4)
CRP SERPL-MCNC: 0.4 MG/L (ref 0–8.2)
DIFFERENTIAL METHOD: ABNORMAL
EOSINOPHIL # BLD AUTO: 0.3 K/UL (ref 0–0.5)
EOSINOPHIL NFR BLD: 3.2 % (ref 0–8)
ERYTHROCYTE [DISTWIDTH] IN BLOOD BY AUTOMATED COUNT: 12.3 % (ref 11.5–14.5)
ERYTHROCYTE [SEDIMENTATION RATE] IN BLOOD BY WESTERGREN METHOD: <2 MM/HR (ref 0–36)
EST. GFR  (AFRICAN AMERICAN): >60 ML/MIN/1.73 M^2
EST. GFR  (NON AFRICAN AMERICAN): >60 ML/MIN/1.73 M^2
GLUCOSE SERPL-MCNC: 52 MG/DL (ref 70–110)
HCT VFR BLD AUTO: 37.7 % (ref 37–48.5)
HGB BLD-MCNC: 12.3 G/DL (ref 12–16)
IMM GRANULOCYTES # BLD AUTO: 0.02 K/UL (ref 0–0.04)
IMM GRANULOCYTES NFR BLD AUTO: 0.2 % (ref 0–0.5)
LYMPHOCYTES # BLD AUTO: 2.7 K/UL (ref 1–4.8)
LYMPHOCYTES NFR BLD: 29.3 % (ref 18–48)
MCH RBC QN AUTO: 32.4 PG (ref 27–31)
MCHC RBC AUTO-ENTMCNC: 32.6 G/DL (ref 32–36)
MCV RBC AUTO: 99 FL (ref 82–98)
MONOCYTES # BLD AUTO: 1 K/UL (ref 0.3–1)
MONOCYTES NFR BLD: 10.7 % (ref 4–15)
NEUTROPHILS # BLD AUTO: 5 K/UL (ref 1.8–7.7)
NEUTROPHILS NFR BLD: 55.5 % (ref 38–73)
NRBC BLD-RTO: 0 /100 WBC
PLATELET # BLD AUTO: 316 K/UL (ref 150–350)
PMV BLD AUTO: 9 FL (ref 9.2–12.9)
POTASSIUM SERPL-SCNC: 4.4 MMOL/L (ref 3.5–5.1)
PROT SERPL-MCNC: 6.1 G/DL (ref 6–8.4)
RBC # BLD AUTO: 3.8 M/UL (ref 4–5.4)
SODIUM SERPL-SCNC: 136 MMOL/L (ref 136–145)
WBC # BLD AUTO: 9.08 K/UL (ref 3.9–12.7)

## 2019-10-02 PROCEDURE — 36415 COLL VENOUS BLD VENIPUNCTURE: CPT | Mod: PO

## 2019-10-02 PROCEDURE — 86140 C-REACTIVE PROTEIN: CPT

## 2019-10-02 PROCEDURE — 85025 COMPLETE CBC W/AUTO DIFF WBC: CPT

## 2019-10-02 PROCEDURE — 85652 RBC SED RATE AUTOMATED: CPT

## 2019-10-02 PROCEDURE — 80053 COMPREHEN METABOLIC PANEL: CPT

## 2019-10-04 ENCOUNTER — TELEPHONE (OUTPATIENT)
Dept: OPHTHALMOLOGY | Facility: CLINIC | Age: 64
End: 2019-10-04

## 2019-10-04 NOTE — TELEPHONE ENCOUNTER
Spoke to pt and advised to stop using the Ofloxacin eyedrops 1 week after her surgery and to continue using PF and Ketorolac eyedrops for a total of one month after surgery.  Pt understood.

## 2019-10-04 NOTE — TELEPHONE ENCOUNTER
----- Message from Allyson Dennison sent at 10/4/2019  8:55 AM CDT -----  Contact: Pt      ----- Message -----  From: Zeny Saxena  Sent: 10/4/2019   8:29 AM CDT  To: [facility]ST[facility] Staff    Patient calling to speak with nurse to find out what eye drop she is to discontinue using after surgery.      Pt contact [phone]

## 2019-10-07 ENCOUNTER — TELEPHONE (OUTPATIENT)
Dept: PAIN MEDICINE | Facility: CLINIC | Age: 64
End: 2019-10-07

## 2019-10-07 DIAGNOSIS — L40.50 PSORIATIC ARTHRITIS: ICD-10-CM

## 2019-10-07 RX ORDER — SULFASALAZINE 500 MG/1
TABLET, DELAYED RELEASE ORAL
Qty: 540 TABLET | Refills: 0 | Status: SHIPPED | OUTPATIENT
Start: 2019-10-07 | End: 2020-02-03 | Stop reason: SDUPTHER

## 2019-10-07 NOTE — TELEPHONE ENCOUNTER
----- Message from Jesika Aragon sent at 10/7/2019  4:21 PM CDT -----  Contact: Patient  Patient wants to know if she can get scheduled to get a burning of the nerve of sciatica on both sides.     Please call [phone] to discuss today.

## 2019-10-08 ENCOUNTER — TELEPHONE (OUTPATIENT)
Dept: PAIN MEDICINE | Facility: CLINIC | Age: 64
End: 2019-10-08

## 2019-10-08 ENCOUNTER — TELEPHONE (OUTPATIENT)
Dept: RHEUMATOLOGY | Facility: CLINIC | Age: 64
End: 2019-10-08

## 2019-10-08 DIAGNOSIS — M46.1 BILATERAL SACROILIITIS: Primary | ICD-10-CM

## 2019-10-08 NOTE — TELEPHONE ENCOUNTER
Returned call scheduled pt for repeat of SI RFAs    ----- Message from Norma Marino sent at 10/8/2019 10:18 AM CDT -----  Contact: Pt  Pt called to speak to the nurse to discuss her care and would like a call back today at [phone]

## 2019-10-09 ENCOUNTER — TELEPHONE (OUTPATIENT)
Dept: PAIN MEDICINE | Facility: CLINIC | Age: 64
End: 2019-10-09

## 2019-10-09 DIAGNOSIS — M53.3 SACROILIAC JOINT DYSFUNCTION: Primary | ICD-10-CM

## 2019-10-09 DIAGNOSIS — M46.1 BILATERAL SACROILIITIS: ICD-10-CM

## 2019-10-14 ENCOUNTER — TELEPHONE (OUTPATIENT)
Dept: SPORTS MEDICINE | Facility: CLINIC | Age: 64
End: 2019-10-14

## 2019-10-14 ENCOUNTER — TELEPHONE (OUTPATIENT)
Dept: PAIN MEDICINE | Facility: CLINIC | Age: 64
End: 2019-10-14

## 2019-10-14 NOTE — TELEPHONE ENCOUNTER
Left message for patient to call us back and let us know what the % of relief she experienced from the RFA that she had done on 5/7/19. I informed her that we needed it for the insurance.

## 2019-10-14 NOTE — TELEPHONE ENCOUNTER
Called Suni, She stated that the patient was not billed for a RFA on 5/7/19 or 5/14/19. She said it was not billed that way. I gave her information to Brennon to call her back regarding this matter.       ----- Message from Dahlia Pickett sent at 10/14/2019 11:38 AM CDT -----  Contact: Suni  Outpatient Nurse  [phone]    Injection ordered for patient, but they are not sure exactly what injection has been ordered. They also need past results.

## 2019-10-15 RX ORDER — TRAMADOL HYDROCHLORIDE 50 MG/1
TABLET ORAL
Qty: 60 TABLET | Refills: 0 | Status: SHIPPED | OUTPATIENT
Start: 2019-10-15 | End: 2019-11-26 | Stop reason: SDUPTHER

## 2019-10-18 NOTE — DISCHARGE INSTRUCTIONS
Home Care Instructions Pain Management:    1.  DIET:    You may resume your normal diet today.    2.  BATHING:    You may shower with luke warm water.    3.  DRESSING:    You may remove your bandage today.    4.  ACTIVITY LEVEL:      You may resume your normal activities 24 hours after your procedure.    5.  MEDICATIONS:    You may resume your normal medications today.    6.  SPECIAL INSTRUCTIONS:    No heat to the injection site for 24 hours including bath or shower, heating pad, moist heat or hot tubs.    Use an ice pack to the injection site for any pain or discomfort.  Apply ice packs for 20 minute intervals as needed.    If you have received any sedatives by mouth today, you can not drive for 12 hours.    If you have received sedation through an IV, you can not drive for 24 hours.    PLEASE CALL YOUR DOCTOR FOR THE FOLLOWIN.  Redness or swelling around the injection site.  2.  Fever of 101 degrees.  3.  Drainage (pus) from the injection site.  4.  For any continuous bleeding (some dried blood over the incision is normal.)    FOR EMERGENCIES:    If any unusual problems or difficulties occur during clinic hours, call [phone] or dial [phone].    Follow up with with your physician in 2-3 weeks.

## 2019-10-22 ENCOUNTER — HOSPITAL ENCOUNTER (OUTPATIENT)
Facility: HOSPITAL | Age: 64
Discharge: HOME | End: 2019-10-22
Attending: PAIN MEDICINE | Admitting: PAIN MEDICINE
Payer: MEDICARE

## 2019-10-22 ENCOUNTER — TELEPHONE (OUTPATIENT)
Dept: INTERNAL MEDICINE | Facility: CLINIC | Age: 64
End: 2019-10-22

## 2019-10-22 VITALS
DIASTOLIC BLOOD PRESSURE: 96 MMHG | RESPIRATION RATE: 16 BRPM | OXYGEN SATURATION: 95 % | BODY MASS INDEX: 19.14 KG/M2 | HEART RATE: 83 BPM | TEMPERATURE: 98 F | WEIGHT: 104 LBS | HEIGHT: 62 IN | SYSTOLIC BLOOD PRESSURE: 180 MMHG

## 2019-10-22 DIAGNOSIS — G89.29 CHRONIC PAIN: ICD-10-CM

## 2019-10-22 DIAGNOSIS — M53.3 SACROILIAC JOINT DYSFUNCTION: Primary | ICD-10-CM

## 2019-10-22 PROBLEM — M46.1 BILATERAL SACROILIITIS: Status: ACTIVE | Noted: 2019-10-22

## 2019-10-22 PROCEDURE — 64635 PR DESTROY LUMB/SAC FACET JNT: ICD-10-PCS | Mod: LT,,, | Performed by: PAIN MEDICINE

## 2019-10-22 PROCEDURE — 63600175 PHARM REV CODE 636 W HCPCS: Performed by: PAIN MEDICINE

## 2019-10-22 PROCEDURE — S0020 INJECTION, BUPIVICAINE HYDRO: HCPCS | Performed by: PAIN MEDICINE

## 2019-10-22 PROCEDURE — 64635 DESTROY LUMB/SAC FACET JNT: CPT | Mod: LT,,, | Performed by: PAIN MEDICINE

## 2019-10-22 PROCEDURE — 64636 DESTROY L/S FACET JNT ADDL: CPT | Performed by: PAIN MEDICINE

## 2019-10-22 PROCEDURE — A4649 SURGICAL SUPPLIES: HCPCS | Performed by: PAIN MEDICINE

## 2019-10-22 PROCEDURE — 64635 DESTROY LUMB/SAC FACET JNT: CPT | Mod: LT | Performed by: PAIN MEDICINE

## 2019-10-22 PROCEDURE — 64636 PR DESTROY L/S FACET JNT ADDL: ICD-10-PCS | Mod: LT,,, | Performed by: PAIN MEDICINE

## 2019-10-22 PROCEDURE — 64636 DESTROY L/S FACET JNT ADDL: CPT | Mod: LT,,, | Performed by: PAIN MEDICINE

## 2019-10-22 PROCEDURE — 25000003 PHARM REV CODE 250: Performed by: PAIN MEDICINE

## 2019-10-22 RX ORDER — LIDOCAINE HYDROCHLORIDE 10 MG/ML
1 INJECTION, SOLUTION EPIDURAL; INFILTRATION; INTRACAUDAL; PERINEURAL ONCE
Status: DISCONTINUED | OUTPATIENT
Start: 2019-10-22 | End: 2019-10-22 | Stop reason: HOSPADM

## 2019-10-22 RX ORDER — LIDOCAINE HYDROCHLORIDE 20 MG/ML
INJECTION, SOLUTION EPIDURAL; INFILTRATION; INTRACAUDAL; PERINEURAL
Status: DISCONTINUED | OUTPATIENT
Start: 2019-10-22 | End: 2019-10-22 | Stop reason: HOSPADM

## 2019-10-22 RX ORDER — SODIUM CHLORIDE 9 MG/ML
500 INJECTION, SOLUTION INTRAVENOUS CONTINUOUS
Status: DISCONTINUED | OUTPATIENT
Start: 2019-10-22 | End: 2019-10-22 | Stop reason: HOSPADM

## 2019-10-22 RX ORDER — LIDOCAINE HYDROCHLORIDE 10 MG/ML
INJECTION, SOLUTION EPIDURAL; INFILTRATION; INTRACAUDAL; PERINEURAL
Status: DISCONTINUED | OUTPATIENT
Start: 2019-10-22 | End: 2019-10-22 | Stop reason: HOSPADM

## 2019-10-22 RX ORDER — SODIUM BICARBONATE 1 MEQ/ML
SYRINGE (ML) INTRAVENOUS
Status: DISCONTINUED | OUTPATIENT
Start: 2019-10-22 | End: 2019-10-22 | Stop reason: HOSPADM

## 2019-10-22 RX ORDER — METHYLPREDNISOLONE ACETATE 40 MG/ML
INJECTION, SUSPENSION INTRA-ARTICULAR; INTRALESIONAL; INTRAMUSCULAR; SOFT TISSUE
Status: DISCONTINUED | OUTPATIENT
Start: 2019-10-22 | End: 2019-10-22 | Stop reason: HOSPADM

## 2019-10-22 RX ORDER — BUPIVACAINE HYDROCHLORIDE 5 MG/ML
INJECTION, SOLUTION EPIDURAL; INTRACAUDAL
Status: DISCONTINUED | OUTPATIENT
Start: 2019-10-22 | End: 2019-10-22 | Stop reason: HOSPADM

## 2019-10-22 NOTE — H&P (VIEW-ONLY)
[facility]  History & Physical - Short Stay  Pain Management           SUBJECTIVE:     Procedure: Procedure(s) (LRB):  RADIOFREQUENCY THERMAL COAGULATION---DARSAL RAMUS 5 and LATERAL S1,S2,and S3 Right (Right)    Chief Complaint/Reason for Admission:  Bilateral sacroiliitis [M46.1]    PTA Medications   Medication Sig    albuterol (PROVENTIL/VENTOLIN HFA) 90 mcg/actuation inhaler Inhale 2 puffs into the lungs every 6 (six) hours as needed for Wheezing.    albuterol (VENTOLIN HFA) 90 mcg/actuation inhaler Inhale 2 puffs into the lungs every 6 (six) hours as needed for Wheezing. Rescue    amLODIPine (NORVASC) 5 MG tablet Take 1 tablet (5 mg total) by mouth once daily.    aspirin 325 MG tablet Take 325 mg by mouth once daily.    atorvastatin (LIPITOR) 40 MG tablet TAKE 1 TABLET(40 MG) BY MOUTH EVERY DAY    baclofen (LIORESAL) 20 MG tablet Take 1 tablet (20 mg total) by mouth 3 (three) times daily. (Patient taking differently: Take 20 mg by mouth 3 (three) times daily as needed. )    diclofenac sodium (VOLTAREN) 1 % Gel Apply 2 g topically once daily.    gabapentin (NEURONTIN) 300 MG capsule Take 1 capsule (300 mg total) by mouth every evening.    hydroCHLOROthiazide (HYDRODIURIL) 25 MG tablet TAKE 1 TABLET(25 MG) BY MOUTH EVERY DAY (Patient taking differently: TAKE 1 TABLET(25 MG) BY MOUTH DAILY AS NEEDED FOR LLE SWELLING)    ketorolac 0.4% (ACULAR) 0.4 % Drop Place 1 drop into the right eye 3 (three) times daily.    lisinopril 10 MG tablet Take 10 mg by mouth daily as needed.     ofloxacin (OCUFLOX) 0.3 % ophthalmic solution Place 1 drop into the right eye 3 (three) times daily.    prednisoLONE acetate (PRED FORTE) 1 % DrpS Place 1 drop into the right eye 3 (three) times daily.    risperiDONE (RISPERDAL) 2 MG tablet Take 1 tablet (2 mg total) by mouth every evening.    risperiDONE (RISPERDAL) 4 MG tablet Take 1 tablet (4 mg total) by mouth every morning.    secukinumab (COSENTYX PEN)  150 mg/mL PnIj Inject 150 mg into the skin every 4 weeks    sulfaSALAzine (AZULFIDINE) 500 MG TbEC TAKE 3 TABLETS(1500 MG) BY MOUTH TWICE DAILY    traMADol (ULTRAM) 50 mg tablet TAKE 1 TABLET BY MOUTH EVERY 12 HOURS AS NEEDED FOR PAIN    venlafaxine (EFFEXOR-XR) 150 MG Cp24 Take 1 capsule (150 mg total) by mouth once daily.       Review of patient's allergies indicates:   Allergen Reactions    Etanercept Other (See Comments)     Other reaction(s): recurrent infections    Chloramphenicol sod succinate Hives and Other (See Comments)    Codeine Other (See Comments)     Other reaction(s): Stomach upset    Nickel sutures [surgical stainless steel] Dermatitis     Allergic contact dermatitis    Adhesive Rash     Other reaction(s): Rash       Past Medical History:   Diagnosis Date    Allergy     Amblyopia     Anemia     Arthritis 1992    Cataract     Depression     Dry eyes     Dry mouth     Fibromyalgia 2014    Fibromyalgia     GERD (gastroesophageal reflux disease)     History of psychiatric hospitalization     Hyperlipidemia 1992    Hypertension     Kidney stone     Migraine headache     Osteoporosis     Psoriatic arthritis 1992    Right knee pain     post knee replacement surgery (possible rejectiion of metal)    RLS (restless legs syndrome)     Schizophrenia 1992    stable on meds    Urinary tract infection      Past Surgical History:   Procedure Laterality Date    brow ptosis repair Right 2019    Surgeon: Dr. Karla Henson    CATARACT EXTRACTION       SECTION      COLONOSCOPY N/A 2016    Procedure: COLONOSCOPY;  Surgeon: ANDRIA Connelly MD;  Location: [address]);  Service: Endoscopy;  Laterality: N/A;    cyst removed from right sinus  1982    HYSTERECTOMY      VAGINAL HYSTERECTOMY WITHOUT BSO - ENDOMETRIOSIS    INJECTION OF ANESTHETIC AGENT AROUND MEDIAL BRANCH NERVES INNERVATING LUMBAR FACET JOINT N/A 2019     Procedure: Lumbo-sacral Block, DR5 and Lateral Branches of S1,S2, S3;  Surgeon: Michelle Pineda Jr., MD;  Location: [facility]IN [facility];  Service: Pain Management;  Laterality: N/A;  Pt takes  and states she holds ASA on her own whenever she has procedures.  Instructed to hold x 3 days prior to procedure.      INJECTION OF ANESTHETIC AGENT INTO SACROILIAC JOINT Right 8/30/2018    Procedure: BLOCK, SACROILIAC JOINT-Right- ORAL SEDATION;  Surgeon: Michelle Pineda Jr., MD;  Location: [facility];  Service: Pain Management;  Laterality: Right;    INJECTION OF ANESTHETIC AGENT INTO SACROILIAC JOINT Bilateral 9/27/2018    Procedure: BLOCK, SACROILIAC JOINT-BILATERAL;  Surgeon: Michelle Pineda Jr., MD;  Location: [facility];  Service: Pain Management;  Laterality: Bilateral;  Please keep at 10:00 due to trasnsportation    INJECTION OF ANESTHETIC AGENT INTO SACROILIAC JOINT Bilateral 2/7/2019    Procedure: Bilateral Sacroiliac Joint Injection - Per Dr Pineda, not necessary to hold ASA.;  Surgeon: Michelle Pineda Jr., MD;  Location: [facility];  Service: Pain Management;  Laterality: Bilateral;    INTRAOCULAR PROSTHESES INSERTION Right 8/1/2019    Procedure: INSERTION, IOL PROSTHESIS;  Surgeon: Karen Song MD;  Location: [facility] OR [address] [place] [address];  Service: Ophthalmology;  Laterality: Right;    INTRAOCULAR PROSTHESES INSERTION Left 9/26/2019    Procedure: INSERTION, IOL PROSTHESIS;  Surgeon: Karen Song MD;  Location: [facility] OR [address] [place] [address];  Service: Ophthalmology;  Laterality: Left;    JOINT REPLACEMENT Right     knee    KNEE SURGERY      PHACOEMULSIFICATION OF CATARACT Right 8/1/2019    Procedure: PHACOEMULSIFICATION, CATARACT;  Surgeon: Karen Song MD;  Location: [facility] OR [address] [place] [address];  Service: Ophthalmology;  Laterality: Right;    PHACOEMULSIFICATION OF CATARACT Left 9/26/2019    Procedure: PHACOEMULSIFICATION, CATARACT;  Surgeon: Karen Song MD;  Location: [facility] OR [address] [place] [address];  Service:  Ophthalmology;  Laterality: Left;    RADIOFREQUENCY THERMOCOAGULATION Right 2019    Procedure: RADIOFREQUENCY THERMAL COAGULATION RIGHT DORSAL RAMUS 5 AND LATERAL BRANCH OF S1, S2 AND S3;  Surgeon: Michelle Pineda Jr., MD;  Location: [facility];  Service: Pain Management;  Laterality: Right;    RADIOFREQUENCY THERMOCOAGULATION Left 2019    Procedure: RADIOFREQUENCY THERMAL COAGULATION LEFT DORSAL RAMUS 5 AND LATERAL BRANCH OF S1,S2 AND S3;  Surgeon: Michelle Pineda Jr., MD;  Location: [facility];  Service: Pain Management;  Laterality: Left;    Surgery on right knee  1982    tumor removed from back left side upper shoulder  2006     Family History   Problem Relation Age of Onset    Colon cancer Mother     Psoriasis Mother     Cancer Mother         colon    Depression Mother     Diabetes Brother     Arthritis Brother     Heart disease Brother         cad    Cancer Father         lymphoma    Stroke Sister     No Known Problems Daughter     Hypertension Neg Hx     Suicide Neg Hx     Amblyopia Neg Hx     Blindness Neg Hx     Cataracts Neg Hx     Glaucoma Neg Hx     Macular degeneration Neg Hx     Retinal detachment Neg Hx     Strabismus Neg Hx      Social History     Tobacco Use    Smoking status: Former Smoker     Packs/day: 0.50     Years: 10.00     Pack years: 5.00     Types: Cigarettes     Last attempt to quit: 2012     Years since quittin.4    Smokeless tobacco: Former User    Tobacco comment: stopped smoking 2017.     Substance Use Topics    Alcohol use: No    Drug use: No        Review of Systems:  Review of Systems   Constitutional: Negative.    HENT: Negative.    Eyes: Negative.    Respiratory: Negative.    Cardiovascular: Negative.    Gastrointestinal: Negative.    Genitourinary: Negative.    Musculoskeletal: Positive for back pain and joint pain.   Skin: Negative.    Neurological: Negative.    Endo/Heme/Allergies: Negative.     Psychiatric/Behavioral: Negative.          OBJECTIVE:     Vital Signs (Most Recent):       Physical Exam:  General appearance - alert, well appearing, and in no distress  Mental status - AOx3  Eyes - pupils equal and reactive, extraocular eye movements intact  Heart - normal rate, regular rhythm, normal S1, S2, no murmurs, rubs, clicks or gallops  Chest - clear to auscultation, no wheezes, rales or rhonchi, symmetric air entry  Abdomen - soft, nontender, nondistended, no masses or organomegaly  Neurological - alert, oriented, normal speech, no focal findings or movement disorder noted  Extremities - peripheral pulses normal, no pedal edema, no clubbing or cyanosis      ASSESSMENT/PLAN:     There are no hospital problems to display for this patient.       The risks and benefits of this intervention, and alternative therapies were discussed with the patient.  The discussion of risks included infection, bleeding, need for additional procedures or surgery, nerve damage, paralysis, adverse medication reaction(s), stroke, and/or death.  Questions regarding the procedure, risks, expected outcome, and possible side effects were solicited and answered to the patient's satisfaction.  Jessica wishes to proceed with the injection.  Verbal and written consent were verified.      Proceed with intervention as scheduled.      Michelle Pineda Jr, MD  Interventional Pain Medicine / Anesthesiology

## 2019-10-22 NOTE — OP NOTE
Procedure Note    Preoperative Diagnosis: Chronic Low Back Pain & Sacroiliac Joint Dysfunction  Postoperative Diagnosis: Chronic Low Back Pain & Sacroiliac Joint Dysfunction  Procedure Date: 10/22/2019  Procedure Title:  (1) Radiofrequency Ablation of Right L5 Dorsal Ramus  (2) Radiofrequency Ablation of Right Lateral Branches of S1, S2, and S3     (2) Intraoperative Fluoroscopy     (3) IV Moderate Sedation: None    Kit / Equipment: [employer] Synergy Kit with 75 mm Introducer and 2 mm Active Tip ([id number])    Anesthesia: Local    Indications: Bhavna Landry is a 64 y.o. year-old female with chronic low back pain due to sacroiliac joint dysfunction.  The patient had significant relief of the pain with the previous diagnostic nerve blocks.     Findings: Final needle placement consistent with technically sucsessful procedure.     Procedure in Detail: The patients history and physical exam were reviewed.  Informed consent was verified and a time out was performed in the presence of the patient prior to administration of IV sedation. The patient agreed to proceed.    The patient was placed in a prone position and head resting comfortably in a head ring.  Cardiopulmonary monitoring was initiated and medications to achieve IV moderate sedation were administered.  The lumbosacral area was prepped and draped in sterile fashion.    AP and ipsilateral oblique fluoroscopic views were employed to visualize the sacral ala, the location of the L5 dorsal ramus.  Skin overlying the target point was anesthetized with 2-3 mL of lidocaine 1%.  A 14 gauge styletted cannula was advanced through the anesthetized skin to contact os at the sacral ala.  Needle placement was inspected and optimized in lateral fluoroscopic views.  Motor stimulation at 2 Hz & 2 V was negative for muscle contractions in the L5 nerve distribution except for local multifidus twitch.  One mL of lidocaine 2% was injected followed by radiofrequency ablation for 2  min 30 sec with a tissue temperature of 80° C.    The fluoroscope was adjusted to optimize visualization of the dorsal sacral foramina and the corresponding arcuate lines.  A point approximately 5 mm lateral to the dorsal foramina of S1, S2, and S3 was marked on the skin.  A 14 gauge styletted cannula was advanced through the anesthetized skin to contact os at the targeted points.  Prior to lesioning, 1 mL of lidocaine 2% was injected. Radiofrequency lesions (as detailed above) were performed semi-circumferentially at 3 locations for S1, 3 locations for S2, and 2 locations for S3.    A solution consisting of 3 mL 0.5% bupivacaine and 40 mg Depomedrol was divided evenly between the locations and injected through each probe. The probes were removed with a 1% lidocaine flush.  The patients back was cleaned and bandages were placed at the needle insertion sites.    Estimated blood loss: None    Complications: None     Disposition:  The patient tolerated the procedure well and there were no apparent complications. Vital signs remained stable throughout the procedure. The patient was taken to the recovery area where written discharge instructions for the procedure were given.     Follow-up: RTC as scheduled for clinic eval or next procedure.    Michelle Pineda Jr, MD  Interventional Pain Medicine / Anesthesiology

## 2019-10-22 NOTE — PLAN OF CARE
Discharge instructions reviewed with patient. Questions answered. Verbalized understanding, no further questions at this time. . Procedure site is DSI. VSS. No acute distress noted. Staff at bedside to help pt change. Wheeled to DC area by staff. Home with family in private vehicle.

## 2019-10-22 NOTE — H&P
[facility]  History & Physical - Short Stay  Pain Management           SUBJECTIVE:     Procedure: Procedure(s) (LRB):  RADIOFREQUENCY THERMAL COAGULATION---DARSAL RAMUS 5 and LATERAL S1,S2,and S3 Right (Right)    Chief Complaint/Reason for Admission:  Bilateral sacroiliitis [M46.1]    PTA Medications   Medication Sig    albuterol (PROVENTIL/VENTOLIN HFA) 90 mcg/actuation inhaler Inhale 2 puffs into the lungs every 6 (six) hours as needed for Wheezing.    albuterol (VENTOLIN HFA) 90 mcg/actuation inhaler Inhale 2 puffs into the lungs every 6 (six) hours as needed for Wheezing. Rescue    amLODIPine (NORVASC) 5 MG tablet Take 1 tablet (5 mg total) by mouth once daily.    aspirin 325 MG tablet Take 325 mg by mouth once daily.    atorvastatin (LIPITOR) 40 MG tablet TAKE 1 TABLET(40 MG) BY MOUTH EVERY DAY    baclofen (LIORESAL) 20 MG tablet Take 1 tablet (20 mg total) by mouth 3 (three) times daily. (Patient taking differently: Take 20 mg by mouth 3 (three) times daily as needed. )    diclofenac sodium (VOLTAREN) 1 % Gel Apply 2 g topically once daily.    gabapentin (NEURONTIN) 300 MG capsule Take 1 capsule (300 mg total) by mouth every evening.    hydroCHLOROthiazide (HYDRODIURIL) 25 MG tablet TAKE 1 TABLET(25 MG) BY MOUTH EVERY DAY (Patient taking differently: TAKE 1 TABLET(25 MG) BY MOUTH DAILY AS NEEDED FOR LLE SWELLING)    ketorolac 0.4% (ACULAR) 0.4 % Drop Place 1 drop into the right eye 3 (three) times daily.    lisinopril 10 MG tablet Take 10 mg by mouth daily as needed.     ofloxacin (OCUFLOX) 0.3 % ophthalmic solution Place 1 drop into the right eye 3 (three) times daily.    prednisoLONE acetate (PRED FORTE) 1 % DrpS Place 1 drop into the right eye 3 (three) times daily.    risperiDONE (RISPERDAL) 2 MG tablet Take 1 tablet (2 mg total) by mouth every evening.    risperiDONE (RISPERDAL) 4 MG tablet Take 1 tablet (4 mg total) by mouth every morning.    secukinumab (COSENTYX PEN)  150 mg/mL PnIj Inject 150 mg into the skin every 4 weeks    sulfaSALAzine (AZULFIDINE) 500 MG TbEC TAKE 3 TABLETS(1500 MG) BY MOUTH TWICE DAILY    traMADol (ULTRAM) 50 mg tablet TAKE 1 TABLET BY MOUTH EVERY 12 HOURS AS NEEDED FOR PAIN    venlafaxine (EFFEXOR-XR) 150 MG Cp24 Take 1 capsule (150 mg total) by mouth once daily.       Review of patient's allergies indicates:   Allergen Reactions    Etanercept Other (See Comments)     Other reaction(s): recurrent infections    Chloramphenicol sod succinate Hives and Other (See Comments)    Codeine Other (See Comments)     Other reaction(s): Stomach upset    Nickel sutures [surgical stainless steel] Dermatitis     Allergic contact dermatitis    Adhesive Rash     Other reaction(s): Rash       Past Medical History:   Diagnosis Date    Allergy     Amblyopia     Anemia     Arthritis 1992    Cataract     Depression     Dry eyes     Dry mouth     Fibromyalgia 2014    Fibromyalgia     GERD (gastroesophageal reflux disease)     History of psychiatric hospitalization     Hyperlipidemia 1992    Hypertension     Kidney stone     Migraine headache     Osteoporosis     Psoriatic arthritis 1992    Right knee pain     post knee replacement surgery (possible rejectiion of metal)    RLS (restless legs syndrome)     Schizophrenia 1992    stable on meds    Urinary tract infection      Past Surgical History:   Procedure Laterality Date    brow ptosis repair Right 2019    Surgeon: Dr. Karla Henson    CATARACT EXTRACTION       SECTION      COLONOSCOPY N/A 2016    Procedure: COLONOSCOPY;  Surgeon: ANDRIA Connelly MD;  Location: [address]);  Service: Endoscopy;  Laterality: N/A;    cyst removed from right sinus  1982    HYSTERECTOMY      VAGINAL HYSTERECTOMY WITHOUT BSO - ENDOMETRIOSIS    INJECTION OF ANESTHETIC AGENT AROUND MEDIAL BRANCH NERVES INNERVATING LUMBAR FACET JOINT N/A 2019     Procedure: Lumbo-sacral Block, DR5 and Lateral Branches of S1,S2, S3;  Surgeon: Michelle Pineda Jr., MD;  [place] [facility] [place] [facility];  Service: Pain Management;  Laterality: N/A;  Pt takes  and states she holds ASA on her own whenever she has procedures.  Instructed to hold x 3 days prior to procedure.      INJECTION OF ANESTHETIC AGENT INTO SACROILIAC JOINT Right 8/30/2018    Procedure: BLOCK, SACROILIAC JOINT-Right- ORAL SEDATION;  Surgeon: Michelle Pineda Jr., MD;  Location: [facility];  Service: Pain Management;  Laterality: Right;    INJECTION OF ANESTHETIC AGENT INTO SACROILIAC JOINT Bilateral 9/27/2018    Procedure: BLOCK, SACROILIAC JOINT-BILATERAL;  Surgeon: Michelle Pineda Jr., MD;  Location: [facility];  Service: Pain Management;  Laterality: Bilateral;  Please keep at 10:00 due to trasnsportation    INJECTION OF ANESTHETIC AGENT INTO SACROILIAC JOINT Bilateral 2/7/2019    Procedure: Bilateral Sacroiliac Joint Injection - Per Dr Pineda, not necessary to hold ASA.;  Surgeon: Michelle Pineda Jr., MD;  Location: [facility];  Service: Pain Management;  Laterality: Bilateral;    INTRAOCULAR PROSTHESES INSERTION Right 8/1/2019    Procedure: INSERTION, IOL PROSTHESIS;  Surgeon: Karen Song MD;  Location: [facility] OR [address] [place] [address];  Service: Ophthalmology;  Laterality: Right;    INTRAOCULAR PROSTHESES INSERTION Left 9/26/2019    Procedure: INSERTION, IOL PROSTHESIS;  Surgeon: Karen Song MD;  Location: [facility] OR [address] [place] [address];  Service: Ophthalmology;  Laterality: Left;    JOINT REPLACEMENT Right     knee    KNEE SURGERY      PHACOEMULSIFICATION OF CATARACT Right 8/1/2019    Procedure: PHACOEMULSIFICATION, CATARACT;  Surgeon: Karen Song MD;  Location: [facility] OR [address] [place] [address];  Service: Ophthalmology;  Laterality: Right;    PHACOEMULSIFICATION OF CATARACT Left 9/26/2019    Procedure: PHACOEMULSIFICATION, CATARACT;  Surgeon: Karen Song MD;  Location: [facility] OR [address] [place] [address];  Service:  Ophthalmology;  Laterality: Left;    RADIOFREQUENCY THERMOCOAGULATION Right 2019    Procedure: RADIOFREQUENCY THERMAL COAGULATION RIGHT DORSAL RAMUS 5 AND LATERAL BRANCH OF S1, S2 AND S3;  Surgeon: Michelle Pineda Jr., MD;  Location: [facility] [place] [place];  Service: Pain Management;  Laterality: Right;    RADIOFREQUENCY THERMOCOAGULATION Left 2019    Procedure: RADIOFREQUENCY THERMAL COAGULATION LEFT DORSAL RAMUS 5 AND LATERAL BRANCH OF S1,S2 AND S3;  Surgeon: Michelle Pineda Jr., MD;  Location: [facility];  Service: Pain Management;  Laterality: Left;    Surgery on right knee  1982    tumor removed from back left side upper shoulder  2006     Family History   Problem Relation Age of Onset    Colon cancer Mother     Psoriasis Mother     Cancer Mother         colon    Depression Mother     Diabetes Brother     Arthritis Brother     Heart disease Brother         cad    Cancer Father         lymphoma    Stroke Sister     No Known Problems Daughter     Hypertension Neg Hx     Suicide Neg Hx     Amblyopia Neg Hx     Blindness Neg Hx     Cataracts Neg Hx     Glaucoma Neg Hx     Macular degeneration Neg Hx     Retinal detachment Neg Hx     Strabismus Neg Hx      Social History     Tobacco Use    Smoking status: Former Smoker     Packs/day: 0.50     Years: 10.00     Pack years: 5.00     Types: Cigarettes     Last attempt to quit: 2012     Years since quittin.4    Smokeless tobacco: Former User    Tobacco comment: stopped smoking 2017.     Substance Use Topics    Alcohol use: No    Drug use: No        Review of Systems:  Review of Systems   Constitutional: Negative.    HENT: Negative.    Eyes: Negative.    Respiratory: Negative.    Cardiovascular: Negative.    Gastrointestinal: Negative.    Genitourinary: Negative.    Musculoskeletal: Positive for back pain and joint pain.   Skin: Negative.    Neurological: Negative.    Endo/Heme/Allergies: Negative.     Psychiatric/Behavioral: Negative.          OBJECTIVE:     Vital Signs (Most Recent):       Physical Exam:  General appearance - alert, well appearing, and in no distress  Mental status - AOx3  Eyes - pupils equal and reactive, extraocular eye movements intact  Heart - normal rate, regular rhythm, normal S1, S2, no murmurs, rubs, clicks or gallops  Chest - clear to auscultation, no wheezes, rales or rhonchi, symmetric air entry  Abdomen - soft, nontender, nondistended, no masses or organomegaly  Neurological - alert, oriented, normal speech, no focal findings or movement disorder noted  Extremities - peripheral pulses normal, no pedal edema, no clubbing or cyanosis      ASSESSMENT/PLAN:     There are no hospital problems to display for this patient.       The risks and benefits of this intervention, and alternative therapies were discussed with the patient.  The discussion of risks included infection, bleeding, need for additional procedures or surgery, nerve damage, paralysis, adverse medication reaction(s), stroke, and/or death.  Questions regarding the procedure, risks, expected outcome, and possible side effects were solicited and answered to the patient's satisfaction.  Jessica wishes to proceed with the injection.  Verbal and written consent were verified.      Proceed with intervention as scheduled.      Michelle Pineda Jr, MD  Interventional Pain Medicine / Anesthesiology

## 2019-10-22 NOTE — TELEPHONE ENCOUNTER
Pt was okay with me canceling her appointment and will give me a call back with a date to reschedule her annual appointment

## 2019-10-22 NOTE — DISCHARGE SUMMARY
[facility]  Discharge Note  Short Stay     Admit Date: 10/22/2019    Discharge Date: 10/22/2019     Attending Physician: Michelle Pineda Jr, MD    Diagnoses:  Active Hospital Problems    Diagnosis  POA    Bilateral sacroiliitis [M46.1]  Unknown    Chronic pain [G89.29]  Yes      Resolved Hospital Problems   No resolved problems to display.     Discharged Condition: Good     Hospital Course: Patient was admitted for an outpatient interventional pain management procedure and tolerated the procedure well with no complications.     Final Diagnoses: Same as principal problem.     Disposition: Home or Self Care     Follow up/Patient Instructions:    Follow-up Information     Michelle Pineda Jr, MD In 3 weeks.    Specialty:  Pain Medicine  Why:  Follow-Up Appointment  Contact information:  [address]  [address]  [place] [id number] [phone]                   Reconciled Medications:     Medication List      CHANGE how you take these medications    baclofen 20 MG tablet  Commonly known as:  LIORESAL  Take 1 tablet (20 mg total) by mouth 3 (three) times daily.  What changed:    · when to take this  · reasons to take this     hydroCHLOROthiazide 25 MG tablet  Commonly known as:  HYDRODIURIL  TAKE 1 TABLET(25 MG) BY MOUTH EVERY DAY  What changed:  See the new instructions.        CONTINUE taking these medications    * albuterol 90 mcg/actuation inhaler  Commonly known as:  PROVENTIL/VENTOLIN HFA  Inhale 2 puffs into the lungs every 6 (six) hours as needed for Wheezing.     * albuterol 90 mcg/actuation inhaler  Commonly known as:  VENTOLIN HFA  Inhale 2 puffs into the lungs every 6 (six) hours as needed for Wheezing. Rescue     amLODIPine 5 MG tablet  Commonly known as:  NORVASC  Take 1 tablet (5 mg total) by mouth once daily.     aspirin 325 MG tablet  Take 325 mg by mouth once daily.     atorvastatin 40 MG tablet  Commonly known as:  LIPITOR  TAKE 1 TABLET(40 MG) BY MOUTH EVERY DAY     diclofenac  sodium 1 % Gel  Commonly known as:  VOLTAREN  Apply 2 g topically once daily.     gabapentin 300 MG capsule  Commonly known as:  NEURONTIN  Take 1 capsule (300 mg total) by mouth every evening.     ketorolac 0.4% 0.4 % Drop  Commonly known as:  ACULAR  Place 1 drop into the right eye 3 (three) times daily.     lisinopril 10 MG tablet  Take 10 mg by mouth daily as needed.     ofloxacin 0.3 % ophthalmic solution  Commonly known as:  OCUFLOX  Place 1 drop into the right eye 3 (three) times daily.     prednisoLONE acetate 1 % Drps  Commonly known as:  PRED FORTE  Place 1 drop into the right eye 3 (three) times daily.     * risperiDONE 4 MG tablet  Commonly known as:  RISPERDAL  Take 1 tablet (4 mg total) by mouth every morning.     * risperiDONE 2 MG tablet  Commonly known as:  RISPERDAL  Take 1 tablet (2 mg total) by mouth every evening.     secukinumab 150 mg/mL Pnij  Commonly known as:  COSENTYX PEN  Inject 150 mg into the skin every 4 weeks     sulfaSALAzine 500 MG Tbec  Commonly known as:  AZULFIDINE  TAKE 3 TABLETS(1500 MG) BY MOUTH TWICE DAILY     traMADol 50 mg tablet  Commonly known as:  ULTRAM  TAKE 1 TABLET BY MOUTH EVERY 12 HOURS AS NEEDED FOR PAIN     venlafaxine 150 MG Cp24  Commonly known as:  EFFEXOR-XR  Take 1 capsule (150 mg total) by mouth once daily.         * This list has 4 medication(s) that are the same as other medications prescribed for you. Read the directions carefully, and ask your doctor or other care provider to review them with you.              No discharge procedures on file.    Michelle Pineda Jr, MD  Interventional Pain Medicine / Anesthesiology

## 2019-10-23 NOTE — DISCHARGE INSTRUCTIONS
Home Care Instructions Pain Management:    1.  DIET:    You may resume your normal diet today.    2.  BATHING:    You may shower with luke warm water.    3.  DRESSING:    You may remove your bandage today.    4.  ACTIVITY LEVEL:      You may resume your normal activities 24 hours after your procedure.    5.  MEDICATIONS:    You may resume your normal medications today.    6.  SPECIAL INSTRUCTIONS:    No heat to the injection site for 24 hours including bath or shower, heating pad, moist heat or hot tubs.    Use an ice pack to the injection site for any pain or discomfort.  Apply ice packs for 20 minute intervals as needed.    If you have received any sedatives by mouth today, you can not drive for 12 hours.    If you have received sedation through an IV, you can not drive for 24 hours.    PLEASE CALL YOUR DOCTOR FOR THE FOLLOWIN.  Redness or swelling around the injection site.  2.  Fever of 101 degrees.  3.  Drainage (pus) from the injection site.  4.  For any continuous bleeding (some dried blood over the incision is normal.)    FOR EMERGENCIES:    If any unusual problems or difficulties occur during clinic hours, call [phone] or dial [phone].    Follow up with with your physician in 2-3 weeks.

## 2019-10-24 ENCOUNTER — TELEPHONE (OUTPATIENT)
Dept: INTERNAL MEDICINE | Facility: CLINIC | Age: 64
End: 2019-10-24

## 2019-10-24 ENCOUNTER — PATIENT OUTREACH (OUTPATIENT)
Dept: ADMINISTRATIVE | Facility: OTHER | Age: 64
End: 2019-10-24

## 2019-10-24 ENCOUNTER — TELEPHONE (OUTPATIENT)
Dept: PAIN MEDICINE | Facility: CLINIC | Age: 64
End: 2019-10-24

## 2019-10-24 DIAGNOSIS — Z12.31 ENCOUNTER FOR SCREENING MAMMOGRAM FOR MALIGNANT NEOPLASM OF BREAST: Primary | ICD-10-CM

## 2019-10-24 NOTE — TELEPHONE ENCOUNTER
----- Message from Alexys Meza sent at 10/24/2019 10:17 AM CDT -----  Contact: self/[phone]  Pt is calling to get her physical rescheduled she had to cancel 11/4 because the provider won't be in but nothing else is available until January. Please advise.        Thank You

## 2019-10-24 NOTE — TELEPHONE ENCOUNTER
Returned call to patient and scheduled her a sooner appointment to see Daniel. Pt stated that she is in severe pain after procedure. Pt verbalized understanding.       ----- Message from hSane Cam sent at 10/24/2019  2:25 PM CDT -----  Contact: [phone]/self  Patient requesting to speak with you about  Severe pain after the procedure.    Please call back to assist at [phone]

## 2019-10-28 ENCOUNTER — LAB VISIT (OUTPATIENT)
Dept: LAB | Facility: HOSPITAL | Age: 64
End: 2019-10-28
Attending: INTERNAL MEDICINE
Payer: MEDICARE

## 2019-10-28 DIAGNOSIS — E78.5 HYPERLIPIDEMIA, UNSPECIFIED HYPERLIPIDEMIA TYPE: ICD-10-CM

## 2019-10-28 LAB
CHOLEST SERPL-MCNC: 173 MG/DL (ref 120–199)
CHOLEST/HDLC SERPL: 1.7 {RATIO} (ref 2–5)
HDLC SERPL-MCNC: 104 MG/DL (ref 40–75)
HDLC SERPL: 60.1 % (ref 20–50)
LDLC SERPL CALC-MCNC: 58.6 MG/DL (ref 63–159)
NONHDLC SERPL-MCNC: 69 MG/DL
TRIGL SERPL-MCNC: 52 MG/DL (ref 30–150)

## 2019-10-28 PROCEDURE — 36415 COLL VENOUS BLD VENIPUNCTURE: CPT | Mod: PO

## 2019-10-28 PROCEDURE — 80061 LIPID PANEL: CPT

## 2019-10-28 RX ORDER — ALBUTEROL SULFATE 90 UG/1
2 AEROSOL, METERED RESPIRATORY (INHALATION) EVERY 6 HOURS PRN
Qty: 1 EACH | Refills: 11 | Status: SHIPPED | OUTPATIENT
Start: 2019-10-28 | End: 2020-01-15 | Stop reason: SDUPTHER

## 2019-10-28 NOTE — TELEPHONE ENCOUNTER
----- Message from Randall Pope sent at 10/28/2019  1:37 PM CDT -----  Contact: self    Patient is calling for an RX refill or new RX.  Is this a refill or new RX:    RX name and strength: albuterol (VENTOLIN HFA) 90 mcg/actuation inhaler  Directions (copy/paste from chart):    Is this a 30 day or 90 day RX:    Local pharmacy or mail order pharmacy: [id number] - [place] - [address] AT [facility] [address] [phone] (Phone)  [phone] (Fax)   Pharmacy name and phone # (copy/paste from chart):     Comments:

## 2019-10-29 ENCOUNTER — HOSPITAL ENCOUNTER (OUTPATIENT)
Facility: HOSPITAL | Age: 64
Discharge: HOME | End: 2019-10-29
Attending: PAIN MEDICINE | Admitting: PAIN MEDICINE
Payer: MEDICARE

## 2019-10-29 VITALS
DIASTOLIC BLOOD PRESSURE: 72 MMHG | HEIGHT: 62 IN | RESPIRATION RATE: 16 BRPM | TEMPERATURE: 98 F | OXYGEN SATURATION: 95 % | BODY MASS INDEX: 19.14 KG/M2 | SYSTOLIC BLOOD PRESSURE: 167 MMHG | WEIGHT: 104 LBS | HEART RATE: 76 BPM

## 2019-10-29 DIAGNOSIS — M46.1 BILATERAL SACROILIITIS: ICD-10-CM

## 2019-10-29 DIAGNOSIS — M53.3 SACROILIAC JOINT DYSFUNCTION: Primary | ICD-10-CM

## 2019-10-29 DIAGNOSIS — G89.29 CHRONIC PAIN: ICD-10-CM

## 2019-10-29 PROCEDURE — 64635 DESTROY LUMB/SAC FACET JNT: CPT | Mod: LT,,, | Performed by: PAIN MEDICINE

## 2019-10-29 PROCEDURE — 64636 DESTROY L/S FACET JNT ADDL: CPT | Mod: LT,,, | Performed by: PAIN MEDICINE

## 2019-10-29 PROCEDURE — 64635 PR DESTROY LUMB/SAC FACET JNT: ICD-10-PCS | Mod: LT,,, | Performed by: PAIN MEDICINE

## 2019-10-29 PROCEDURE — 63600175 PHARM REV CODE 636 W HCPCS: Performed by: PAIN MEDICINE

## 2019-10-29 PROCEDURE — 64636 DESTROY L/S FACET JNT ADDL: CPT | Performed by: PAIN MEDICINE

## 2019-10-29 PROCEDURE — 25000003 PHARM REV CODE 250: Performed by: PAIN MEDICINE

## 2019-10-29 PROCEDURE — 64635 DESTROY LUMB/SAC FACET JNT: CPT | Performed by: PAIN MEDICINE

## 2019-10-29 PROCEDURE — A4649 SURGICAL SUPPLIES: HCPCS | Performed by: PAIN MEDICINE

## 2019-10-29 PROCEDURE — 64636 PR DESTROY L/S FACET JNT ADDL: ICD-10-PCS | Mod: LT,,, | Performed by: PAIN MEDICINE

## 2019-10-29 PROCEDURE — S0020 INJECTION, BUPIVICAINE HYDRO: HCPCS | Performed by: PAIN MEDICINE

## 2019-10-29 RX ORDER — SODIUM BICARBONATE 1 MEQ/ML
SYRINGE (ML) INTRAVENOUS
Status: DISCONTINUED | OUTPATIENT
Start: 2019-10-29 | End: 2019-10-29 | Stop reason: HOSPADM

## 2019-10-29 RX ORDER — METHYLPREDNISOLONE ACETATE 40 MG/ML
INJECTION, SUSPENSION INTRA-ARTICULAR; INTRALESIONAL; INTRAMUSCULAR; SOFT TISSUE
Status: DISCONTINUED | OUTPATIENT
Start: 2019-10-29 | End: 2019-10-29 | Stop reason: HOSPADM

## 2019-10-29 RX ORDER — SODIUM CHLORIDE 9 MG/ML
500 INJECTION, SOLUTION INTRAVENOUS CONTINUOUS
Status: DISCONTINUED | OUTPATIENT
Start: 2019-10-29 | End: 2019-10-29 | Stop reason: HOSPADM

## 2019-10-29 RX ORDER — BUPIVACAINE HYDROCHLORIDE 5 MG/ML
INJECTION, SOLUTION EPIDURAL; INTRACAUDAL
Status: DISCONTINUED | OUTPATIENT
Start: 2019-10-29 | End: 2019-10-29 | Stop reason: HOSPADM

## 2019-10-29 RX ORDER — LIDOCAINE HYDROCHLORIDE 10 MG/ML
1 INJECTION, SOLUTION EPIDURAL; INFILTRATION; INTRACAUDAL; PERINEURAL ONCE
Status: COMPLETED | OUTPATIENT
Start: 2019-10-29 | End: 2019-10-29

## 2019-10-29 RX ORDER — LIDOCAINE HYDROCHLORIDE 20 MG/ML
INJECTION, SOLUTION EPIDURAL; INFILTRATION; INTRACAUDAL; PERINEURAL
Status: DISCONTINUED | OUTPATIENT
Start: 2019-10-29 | End: 2019-10-29 | Stop reason: HOSPADM

## 2019-10-29 NOTE — DISCHARGE SUMMARY
[facility]  Discharge Note  Short Stay     Admit Date: 10/29/2019    Discharge Date: 10/29/2019     Attending Physician: Michelle Pineda Jr, MD    Diagnoses:  Active Hospital Problems    Diagnosis  POA    *Sacroiliac joint dysfunction [M53.3]  Yes    Chronic pain [G89.29]  Yes      Resolved Hospital Problems   No resolved problems to display.     Discharged Condition: Good     Hospital Course: Patient was admitted for an outpatient interventional pain management procedure and tolerated the procedure well with no complications.     Final Diagnoses: Same as principal problem.     Disposition: Home or Self Care     Follow up/Patient Instructions:    Follow-up Information     Michelle Pineda Jr, MD. Go in 2 weeks.    Specialty:  Pain Medicine  Why:  Post-procedural Follow Up As Scheduled, Call to make an appointment if you do not have one  Contact information:  [address]  [address]  [place] [address]  [phone]                   Reconciled Medications:     Medication List      CHANGE how you take these medications    baclofen 20 MG tablet  Commonly known as:  LIORESAL  Take 1 tablet (20 mg total) by mouth 3 (three) times daily.  What changed:    · when to take this  · reasons to take this     hydroCHLOROthiazide 25 MG tablet  Commonly known as:  HYDRODIURIL  TAKE 1 TABLET(25 MG) BY MOUTH EVERY DAY  What changed:  See the new instructions.        CONTINUE taking these medications    * albuterol 90 mcg/actuation inhaler  Commonly known as:  VENTOLIN HFA  Inhale 2 puffs into the lungs every 6 (six) hours as needed for Wheezing. Rescue     * albuterol 90 mcg/actuation inhaler  Commonly known as:  PROVENTIL/VENTOLIN HFA  Inhale 2 puffs into the lungs every 6 (six) hours as needed for Wheezing.     amLODIPine 5 MG tablet  Commonly known as:  NORVASC  Take 1 tablet (5 mg total) by mouth once daily.     aspirin 325 MG tablet  Take 325 mg by mouth once daily.     atorvastatin 40 MG tablet  Commonly  known as:  LIPITOR  TAKE 1 TABLET(40 MG) BY MOUTH EVERY DAY     diclofenac sodium 1 % Gel  Commonly known as:  VOLTAREN  Apply 2 g topically once daily.     gabapentin 300 MG capsule  Commonly known as:  NEURONTIN  Take 1 capsule (300 mg total) by mouth every evening.     ketorolac 0.4% 0.4 % Drop  Commonly known as:  ACULAR  Place 1 drop into the right eye 3 (three) times daily.     lisinopril 10 MG tablet  Take 10 mg by mouth daily as needed.     ofloxacin 0.3 % ophthalmic solution  Commonly known as:  OCUFLOX  Place 1 drop into the right eye 3 (three) times daily.     prednisoLONE acetate 1 % Drps  Commonly known as:  PRED FORTE  Place 1 drop into the right eye 3 (three) times daily.     * risperiDONE 4 MG tablet  Commonly known as:  RISPERDAL  Take 1 tablet (4 mg total) by mouth every morning.     * risperiDONE 2 MG tablet  Commonly known as:  RISPERDAL  Take 1 tablet (2 mg total) by mouth every evening.     secukinumab 150 mg/mL Pnij  Commonly known as:  COSENTYX PEN  Inject 150 mg into the skin every 4 weeks     sulfaSALAzine 500 MG Tbec  Commonly known as:  AZULFIDINE  TAKE 3 TABLETS(1500 MG) BY MOUTH TWICE DAILY     traMADol 50 mg tablet  Commonly known as:  ULTRAM  TAKE 1 TABLET BY MOUTH EVERY 12 HOURS AS NEEDED FOR PAIN     venlafaxine 150 MG Cp24  Commonly known as:  EFFEXOR-XR  Take 1 capsule (150 mg total) by mouth once daily.         * This list has 4 medication(s) that are the same as other medications prescribed for you. Read the directions carefully, and ask your doctor or other care provider to review them with you.               Discharge Procedure Orders (must include Diet, Follow-up, Activity)   Call MD for:  temperature >100.4     Call MD for:  severe uncontrolled pain     Call MD for:  redness, tenderness, or signs of infection (pain, swelling, redness, odor or green/yellow discharge around incision site)     Call MD for:  difficulty breathing or increased cough     Call MD for:  severe  persistent headache     Call MD for:  worsening rash     Remove dressing in 24 hours       Michelle Pineda Jr, MD  Interventional Pain Medicine / Anesthesiology

## 2019-10-29 NOTE — PLAN OF CARE
Reviewed discharge instructions. Pt gives good teachback. Left ambulatory (no sedation), stable, no distress

## 2019-10-29 NOTE — PROGRESS NOTES
Patient requested no sedation for procedure. Dr. Alab aware. Consent signed in room. O2 2l applied for sat 93% while lying on stomach.

## 2019-10-29 NOTE — OP NOTE
Procedure Note    Preoperative Diagnosis: Chronic Low Back Pain & Sacroiliac Joint Dysfunction  Postoperative Diagnosis: Chronic Low Back Pain & Sacroiliac Joint Dysfunction  Procedure Date: 10/29/2019  Procedure Title:  (1) Radiofrequency Ablation of Left L5 Dorsal Ramus  (2) Radiofrequency Ablation of LEFT Lateral Branches of S1, S2, and S3     (2) Intraoperative Fluoroscopy     (3) IV Moderate Sedation: None    Kit / Equipment: [employer] Synergy Kit with 75 mm Introducer and 2 mm Active Tip ([id number])    Anesthesia: Local    Indications: Bhavna Landry is a 64 y.o. year-old female with chronic low back pain due to sacroiliac joint dysfunction.  The patient had significant relief of the pain with the previous diagnostic nerve blocks.     Findings: Final needle placement consistent with technically sucsessful procedure.     Procedure in Detail: The patients history and physical exam were reviewed.  Informed consent was verified and a time out was performed in the presence of the patient prior to administration of IV sedation. The patient agreed to proceed.    The patient was placed in a prone position and head resting comfortably in a head ring.  Cardiopulmonary monitoring was initiated and medications to achieve IV moderate sedation were administered.  The lumbosacral area was prepped and draped in sterile fashion.    AP and ipsilateral oblique fluoroscopic views were employed to visualize the sacral ala, the location of the L5 dorsal ramus.  Skin overlying the target point was anesthetized with 2-3 mL of lidocaine 1%.  A 14 gauge styletted cannula was advanced through the anesthetized skin to contact os at the sacral ala.  Needle placement was inspected and optimized in lateral fluoroscopic views.  Motor stimulation at 2 Hz & 2 V was negative for muscle contractions in the L5 nerve distribution except for local multifidus twitch.  One mL of lidocaine 2% was injected followed by radiofrequency ablation for 2  min 30 sec with a tissue temperature of 80° C.    The fluoroscope was adjusted to optimize visualization of the dorsal sacral foramina and the corresponding arcuate lines.  A point approximately 5 mm lateral to the dorsal foramina of S1, S2, and S3 was marked on the skin.  A 14 gauge styletted cannula was advanced through the anesthetized skin to contact os at the targeted points.  Prior to lesioning, 1 mL of lidocaine 2% was injected. Radiofrequency lesions (as detailed above) were performed semi-circumferentially at 3 locations for S1, 3 locations for S2, and 2 locations for S3.    A solution consisting of 3 mL 0.5% bupivacaine and 40 mg Depomedrol was divided evenly between the locations and injected through each probe. The probes were removed with a 1% lidocaine flush.  The patients back was cleaned and bandages were placed at the needle insertion sites.    Estimated blood loss: None    Complications: None     Disposition:  The patient tolerated the procedure well and there were no apparent complications. Vital signs remained stable throughout the procedure. The patient was taken to the recovery area where written discharge instructions for the procedure were given.     Follow-up: RTC as scheduled for clinic eval or next procedure.    Michelle Pineda Jr, MD  Interventional Pain Medicine / Anesthesiology

## 2019-10-30 ENCOUNTER — PATIENT OUTREACH (OUTPATIENT)
Dept: ADMINISTRATIVE | Facility: OTHER | Age: 64
End: 2019-10-30

## 2019-10-30 ENCOUNTER — OFFICE VISIT (OUTPATIENT)
Dept: OPTOMETRY | Facility: CLINIC | Age: 64
End: 2019-10-30
Payer: MEDICARE

## 2019-10-30 DIAGNOSIS — Z98.41 CATARACT EXTRACTION STATUS OF EYE, RIGHT: Primary | ICD-10-CM

## 2019-10-30 PROCEDURE — 99024 PR POST-OP FOLLOW-UP VISIT: ICD-10-PCS | Mod: S$GLB,,, | Performed by: OPTOMETRIST

## 2019-10-30 PROCEDURE — 99024 POSTOP FOLLOW-UP VISIT: CPT | Mod: S$GLB,,, | Performed by: OPTOMETRIST

## 2019-10-30 PROCEDURE — 99999 PR PBB SHADOW E&M-EST. PATIENT-LVL II: ICD-10-PCS | Mod: PBBFAC,,, | Performed by: OPTOMETRIST

## 2019-10-30 PROCEDURE — 99999 PR PBB SHADOW E&M-EST. PATIENT-LVL II: CPT | Mod: PBBFAC,,, | Performed by: OPTOMETRIST

## 2019-10-30 NOTE — PROGRESS NOTES
HPI     DLS; 9/27/19 eddie   Pt states she is here for a check up on her eyes afte the CAT sx. States   no va problems wears OTC readers +2.50.   S/p phaco w/ IOL OS 9/26/19   s/p phaco w/IOL OD 8/1/19  Occ. Floaters OU, no flashes   No gtts     Last edited by Bradley Coello, OD on 10/30/2019  1:45 PM. (History)        ROS     Positive for: Eyes (cat surgery OU/duanes OD)    Negative for: Constitutional, Gastrointestinal, Neurological, Skin,   Genitourinary, Musculoskeletal, HENT, Endocrine, Cardiovascular,   Respiratory, Psychiatric, Allergic/Imm, Heme/Lymph    Last edited by Bradley Coello, OD on 10/30/2019  1:52 PM. (History)        Assessment /Plan     For exam results, see Encounter Report.    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 1 month post -op cat surgery OU--doing well.  Pt happy w otc readers  3. Migraine hx  4. Hx  VR tag with small flap OD @ 7:00 s/p barrier 10/13 with Dr. Moy--pt has appt w Dr Skelton in 3 weeks so deferred dilation today  5. Dry eye sx--advised REFRESH PLUS ATs    PLAN:    rtc as sched w Dr Skelton, and 1 yr to me

## 2019-11-03 ENCOUNTER — OFFICE VISIT (OUTPATIENT)
Dept: URGENT CARE | Facility: CLINIC | Age: 64
End: 2019-11-03
Payer: MEDICARE

## 2019-11-03 VITALS
TEMPERATURE: 99 F | HEART RATE: 68 BPM | DIASTOLIC BLOOD PRESSURE: 67 MMHG | RESPIRATION RATE: 16 BRPM | BODY MASS INDEX: 19.14 KG/M2 | HEIGHT: 62 IN | WEIGHT: 104 LBS | SYSTOLIC BLOOD PRESSURE: 122 MMHG | OXYGEN SATURATION: 97 %

## 2019-11-03 DIAGNOSIS — H65.03 BILATERAL ACUTE SEROUS OTITIS MEDIA, RECURRENCE NOT SPECIFIED: Primary | ICD-10-CM

## 2019-11-03 DIAGNOSIS — R05.9 COUGH: ICD-10-CM

## 2019-11-03 DIAGNOSIS — J20.8 ACUTE BACTERIAL BRONCHITIS: ICD-10-CM

## 2019-11-03 DIAGNOSIS — J02.9 SORE THROAT: ICD-10-CM

## 2019-11-03 DIAGNOSIS — B96.89 ACUTE BACTERIAL BRONCHITIS: ICD-10-CM

## 2019-11-03 LAB
CTP QC/QA: YES
CTP QC/QA: YES
FLUAV AG NPH QL: NEGATIVE
FLUBV AG NPH QL: NEGATIVE
S PYO RRNA THROAT QL PROBE: NEGATIVE

## 2019-11-03 PROCEDURE — 1101F PR PT FALLS ASSESS DOC 0-1 FALLS W/OUT INJ PAST YR: ICD-10-PCS | Mod: CPTII,S$GLB,, | Performed by: PHYSICIAN ASSISTANT

## 2019-11-03 PROCEDURE — 87880 POCT RAPID STREP A: ICD-10-PCS | Mod: QW,S$GLB,, | Performed by: PHYSICIAN ASSISTANT

## 2019-11-03 PROCEDURE — 87804 POCT INFLUENZA A/B: ICD-10-PCS | Mod: 59,QW,S$GLB, | Performed by: PHYSICIAN ASSISTANT

## 2019-11-03 PROCEDURE — 87804 INFLUENZA ASSAY W/OPTIC: CPT | Mod: QW,S$GLB,, | Performed by: PHYSICIAN ASSISTANT

## 2019-11-03 PROCEDURE — 3078F PR MOST RECENT DIASTOLIC BLOOD PRESSURE < 80 MM HG: ICD-10-PCS | Mod: CPTII,S$GLB,, | Performed by: PHYSICIAN ASSISTANT

## 2019-11-03 PROCEDURE — 3074F PR MOST RECENT SYSTOLIC BLOOD PRESSURE < 130 MM HG: ICD-10-PCS | Mod: CPTII,S$GLB,, | Performed by: PHYSICIAN ASSISTANT

## 2019-11-03 PROCEDURE — 3074F SYST BP LT 130 MM HG: CPT | Mod: CPTII,S$GLB,, | Performed by: PHYSICIAN ASSISTANT

## 2019-11-03 PROCEDURE — 1101F PT FALLS ASSESS-DOCD LE1/YR: CPT | Mod: CPTII,S$GLB,, | Performed by: PHYSICIAN ASSISTANT

## 2019-11-03 PROCEDURE — 87880 STREP A ASSAY W/OPTIC: CPT | Mod: QW,S$GLB,, | Performed by: PHYSICIAN ASSISTANT

## 2019-11-03 PROCEDURE — 99214 OFFICE O/P EST MOD 30 MIN: CPT | Mod: S$GLB,,, | Performed by: PHYSICIAN ASSISTANT

## 2019-11-03 PROCEDURE — 3078F DIAST BP <80 MM HG: CPT | Mod: CPTII,S$GLB,, | Performed by: PHYSICIAN ASSISTANT

## 2019-11-03 PROCEDURE — 99214 PR OFFICE/OUTPT VISIT, EST, LEVL IV, 30-39 MIN: ICD-10-PCS | Mod: S$GLB,,, | Performed by: PHYSICIAN ASSISTANT

## 2019-11-03 RX ORDER — BENZONATATE 100 MG/1
100 CAPSULE ORAL EVERY 6 HOURS PRN
Qty: 30 CAPSULE | Refills: 1 | Status: ON HOLD | OUTPATIENT
Start: 2019-11-03 | End: 2020-06-02 | Stop reason: CLARIF

## 2019-11-03 RX ORDER — PREDNISONE 10 MG/1
10 TABLET ORAL DAILY
Qty: 3 TABLET | Refills: 0 | Status: SHIPPED | OUTPATIENT
Start: 2019-11-03 | End: 2019-11-06

## 2019-11-03 RX ORDER — DOXYCYCLINE 100 MG/1
100 CAPSULE ORAL 2 TIMES DAILY
Qty: 14 CAPSULE | Refills: 0 | Status: SHIPPED | OUTPATIENT
Start: 2019-11-03 | End: 2019-11-10

## 2019-11-03 NOTE — PATIENT INSTRUCTIONS
Bronchitis, Antibiotic Treatment (Adult)    Bronchitis is an infection of the air passages (bronchial tubes) in your lungs. It often occurs when you have a cold. This illness is contagious during the first few days and is spread through the air by coughing and sneezing, or by direct contact (touching the sick person and then touching your own eyes, nose, or mouth).  Symptoms of bronchitis include cough with mucus (phlegm) and low-grade fever. Bronchitis usually lasts 7 to 14 days. Mild cases can be treated with simple home remedies. More severe infection is treated with an antibiotic.  Home care  Follow these guidelines when caring for yourself at home:  · If your symptoms are severe, rest at home for the first 2 to 3 days. When you go back to your usual activities, don't let yourself get too tired.  · Do not smoke. Also avoid being exposed to secondhand smoke.  · You may use over-the-counter medicines to control fever or pain, unless another medicine was prescribed. (Note: If you have chronic liver or kidney disease or have ever had a stomach ulcer or gastrointestinal bleeding, talk with your healthcare provider before using these medicines. Also talk to your provider if you are taking medicine to prevent blood clots.) Aspirin should never be given to anyone younger than 18 years of age who is ill with a viral infection or fever. It may cause severe liver or brain damage.  · Your appetite may be poor, so a light diet is fine. Avoid dehydration by drinking 6 to 8 glasses of fluids per day (such as water, soft drinks, sports drinks, juices, tea, or soup). Extra fluids will help loosen secretions in the nose and lungs.  · Over-the-counter cough, cold, and sore-throat medicines will not shorten the length of the illness, but they may be helpful to reduce symptoms. (Note: Do not use decongestants if you have high blood pressure.)  · Finish all antibiotic medicine. Do this even if you are feeling better after only a  few days.  Follow-up care  Follow up with your healthcare provider, or as advised. If you had an X-ray or ECG (electrocardiogram), a specialist will review it. You will be notified of any new findings that may affect your care.  Note: If you are age 65 or older, or if you have a chronic lung disease or condition that affects your immune system, or you smoke, talk to your healthcare provider about having pneumococcal vaccinations and a yearly influenza vaccination (flu shot).  When to seek medical advice  Call your healthcare provider right away if any of these occur:  · Fever of 100.4°F (38°C) or higher  · Coughing up increased amounts of colored sputum  · Weakness, drowsiness, headache, facial pain, ear pain, or a stiff neck  Call 911, or get immediate medical care  Contact emergency services right away if any of these occur.  · Coughing up blood  · Worsening weakness, drowsiness, headache, or stiff neck  · Trouble breathing, wheezing, or pain with breathing  Date Last Reviewed: 9/13/2015 © 2000-2017 [employer]. [address] [place] [address]. All rights reserved. This information is not intended as a substitute for professional medical care. Always follow your healthcare professional's instructions.        Middle Ear Infection (Adult)  You have an infection of the middle ear, the space behind the eardrum. This is also called acute otitis media (AOM). Sometimes it is caused by the common cold. This is because congestion can block the internal passage (eustachian tube) that drains fluid from the middle ear. When the middle ear fills with fluid, bacteria can grow there and cause an infection. Oral antibiotics are used to treat this illness, not ear drops. Symptoms usually start to improve within 1 to 2 days of treatment.    Home care  The following are general care guidelines:  · Finish all of the antibiotic medicine given, even though you may feel better after the first few days.  · You  may use over-the-counter medicine, such as acetaminophen or ibuprofen, to control pain and fever, unless something else was prescribed. If you have chronic liver or kidney disease or have ever had a stomach ulcer or gastrointestinal bleeding, talk with your healthcare provider before using these medicines. Do not give aspirin to anyone under 18 years of age who has a fever. It may cause severe illness or death.  Follow-up care  Follow up with your healthcare provider, or as advised, in 2 weeks if all symptoms have not gotten better, or if hearing doesn't go back to normal within 1 month.  When to seek medical advice  Call your healthcare provider right away if any of these occur:  · Ear pain gets worse or does not improve after 3 days of treatment  · Unusual drowsiness or confusion  · Neck pain, stiff neck, or headache  · Fluid or blood draining from the ear canal  · Fever of 100.4°F (38°C) or as advised   · Seizure  Date Last Reviewed: 6/1/2016  © 2370-5298 [employer]. [address] [place] [address], [place] [address]. All rights reserved. This information is not intended as a substitute for professional medical care. Always follow your healthcare professional's instructions.        Self-Care for Sore Throats    Sore throats happen for many reasons, such as colds, allergies, and infections caused by viruses or bacteria. In any case, your throat becomes red and sore. Your goal for self-care is to reduce your discomfort while giving your throat a chance to heal.  Moisten and soothe your throat  Tips include the following:  · Try a sip of water first thing after waking up.  · Keep your throat moist by drinking 6 or more glasses of clear liquids every day.  · Run a cool-air humidifier in your room overnight.  · Avoid cigarette smoke.   · Suck on throat lozenges, cough drops, hard candy, ice chips, or frozen fruit-juice bars. Use the sugar-free versions if your diet or medical condition requires them.  Gargle to  ease irritation  Gargling every hour or 2 can ease irritation. Try gargling with 1 of these solutions:  · 1/4 teaspoon of salt in 1/2 cup of warm water  · An over-the-counter anesthetic gargle  Use medicine for more relief  Over-the-counter medicine can reduce sore throat symptoms. Ask your pharmacist if you have questions about which medicine to use:  · Ease pain with anesthetic sprays. Aspirin or an aspirin substitute also helps. Remember, never give aspirin to anyone 18 or younger, or if you are already taking blood thinners.   · For sore throats caused by allergies, try antihistamines to block the allergic reaction.  · Remember: unless a sore throat is caused by a bacterial infection, antibiotics wont help you.  Prevent future sore throats  Prevention tips include the following:  · Stop smoking or reduce contact with secondhand smoke. Smoke irritates the tender throat lining.  · Limit contact with pets and with allergy-causing substances, such as pollen and mold.  · When youre around someone with a sore throat or cold, wash your hands often to keep viruses or bacteria from spreading.  · Dont strain your vocal cords.  Call your healthcare provider  Contact your healthcare provider if you have:  · A temperature over 101°F (38.3°C)  · White spots on the throat  · Great difficulty swallowing  · Trouble breathing  · A skin rash  · Recent exposure to someone else with strep bacteria  · Severe hoarseness and swollen glands in the neck or jaw   Date Last Reviewed: 8/1/2016  © 1568-6023 [employer]. [address] [place] [address], [place] [address]. All rights reserved. This information is not intended as a substitute for professional medical care. Always follow your healthcare professional's instructions.      Patient Instructions   -Below are suggestions for symptomatic relief:              -Tylenol every 4 hours OR ibuprofen every 6 hours as needed for pain/fever.              -Salt water gargles to soothe  throat pain.              -Chloroseptic spray also helps to numb throat pain.              -Nasal saline spray reduces inflammation and dryness.              -Warm face compresses to help with facial sinus pain/pressure.              -Vicks vapor rub at night.              -Flonase OTC or Nasacort OTC for nasal congestion.              -Simple foods like chicken noodle soup.              -Delsym helps with coughing at night              -Zyrtec/Claritin during the day & Benadryl at night may help with allergies.                If you DO NOT have Hypertension or any history of palpitations, it is ok to take over the counter Sudafed or Mucinex D or Allegra-D or Claritin-D or Zyrtec-D.  If you do take one of the above, it is ok to combine that with plain over the counter Mucinex or Allegra or Claritin or Zyrtec. If, for example, you are taking Zyrtec -D, you can combine that with Mucinex, but not Mucinex-D.  If you are taking Mucinex-D, you can combine that with plain Allegra or Claritin or Zyrtec.   If you DO have Hypertension or palpitations, it is safe to take Coricidin HBP for relief of sinus symptoms.    Please follow up with your Primary care provider within 2-5 days if your signs and symptoms have not resolved or worsen.     If your condition worsens or fails to improve we recommend that you receive another evaluation at the emergency room immediately or contact your primary medical clinic to discuss your concerns.   You must understand that you have received an Urgent Care treatment only and that you may be released before all of your medical problems are known or treated. You, the patient, will arrange for follow up care as instructed.     RED FLAGS/WARNING SYMPTOMS DISCUSSED WITH PATIENT THAT WOULD WARRANT EMERGENT MEDICAL ATTENTION. PATIENT VERBALIZED UNDERSTANDING.

## 2019-11-03 NOTE — PROGRESS NOTES
"Subjective:       Patient ID: Bhavna Landry is a 64 y.o. female.    Vitals:  height is 5' 2" (1.575 m) and weight is 47.2 kg (104 lb). Her oral temperature is 98.7 °F (37.1 °C). Her blood pressure is 122/67 and her pulse is 68. Her respiration is 16 and oxygen saturation is 97%.     Chief Complaint: Sore Throat    Sore Throat    This is a new problem. Episode onset: 8 days. The problem has been gradually worsening. Neither side of throat is experiencing more pain than the other. There has been no fever. The pain is at a severity of 8/10. The pain is moderate. Associated symptoms include congestion, coughing, a hoarse voice, swollen glands and trouble swallowing. Pertinent negatives include no ear pain, shortness of breath, stridor or vomiting. She has tried acetaminophen for the symptoms. The treatment provided mild relief.       Constitution: Positive for sweating and fatigue. Negative for chills and fever.   HENT: Positive for congestion, sinus pain, sinus pressure, sore throat and trouble swallowing. Negative for ear pain and voice change.    Neck: Positive for painful lymph nodes.   Eyes: Negative for eye redness.   Respiratory: Positive for cough and sputum production. Negative for chest tightness, bloody sputum, COPD, shortness of breath, stridor, wheezing and asthma.    Gastrointestinal: Positive for nausea. Negative for vomiting.   Musculoskeletal: Negative for muscle ache.   Skin: Negative for rash.   Allergic/Immunologic: Negative for seasonal allergies and asthma.   Hematologic/Lymphatic: Positive for swollen lymph nodes.       Objective:      Physical Exam   Constitutional: She is oriented to person, place, and time. She appears well-developed and well-nourished. She is cooperative.  Non-toxic appearance. She does not have a sickly appearance. She does not appear ill. No distress.   HENT:   Head: Normocephalic and atraumatic.   Right Ear: Hearing, external ear and ear canal normal. Tympanic membrane is " erythematous. A middle ear effusion is present.   Left Ear: Hearing, external ear and ear canal normal. Tympanic membrane is erythematous. A middle ear effusion is present.   Nose: Nose normal. No mucosal edema, rhinorrhea or nasal deformity. No epistaxis. Right sinus exhibits no maxillary sinus tenderness and no frontal sinus tenderness. Left sinus exhibits no maxillary sinus tenderness and no frontal sinus tenderness.   Mouth/Throat: Uvula is midline and mucous membranes are normal. No trismus in the jaw. Normal dentition. No uvula swelling. Posterior oropharyngeal erythema present. No oropharyngeal exudate, posterior oropharyngeal edema, tonsillar abscesses or cobblestoning.   Eyes: Conjunctivae and lids are normal. No scleral icterus.   Neck: Trachea normal, full passive range of motion without pain and phonation normal. Neck supple. No neck rigidity. No edema and no erythema present.   Cardiovascular: Normal rate, regular rhythm, normal heart sounds, intact distal pulses and normal pulses.   Pulmonary/Chest: Effort normal and breath sounds normal. No accessory muscle usage or stridor. No respiratory distress. She has no decreased breath sounds. She has no wheezes. She has no rhonchi. She has no rales. She exhibits no tenderness and no bony tenderness.   Moderate upper airway congestion with intermittent nonproductive cough noted.   Abdominal: Normal appearance.   Musculoskeletal: Normal range of motion. She exhibits no edema or deformity.   Neurological: She is alert and oriented to person, place, and time. She exhibits normal muscle tone. Coordination normal.   Skin: Skin is warm, dry, intact, not diaphoretic and not pale.   Psychiatric: She has a normal mood and affect. Her speech is normal and behavior is normal. Judgment and thought content normal. Cognition and memory are normal.   Nursing note and vitals reviewed.        Assessment:       1. Bilateral acute serous otitis media, recurrence not specified     2. Sore throat    3. Acute bacterial bronchitis    4. Cough        Plan:         Bilateral acute serous otitis media, recurrence not specified    Sore throat  -     POCT rapid strep A  -     POCT Influenza A/B    Acute bacterial bronchitis  -     benzonatate (TESSALON PERLES) 100 MG capsule; Take 1 capsule (100 mg total) by mouth every 6 (six) hours as needed for Cough.  Dispense: 30 capsule; Refill: 1  -     predniSONE (DELTASONE) 10 MG tablet; Take 1 tablet (10 mg total) by mouth once daily. for 3 days  Dispense: 3 tablet; Refill: 0  -     doxycycline (VIBRAMYCIN) 100 MG Cap; Take 1 capsule (100 mg total) by mouth 2 (two) times daily. for 7 days  Dispense: 14 capsule; Refill: 0    Cough           Bronchitis, Antibiotic Treatment (Adult)    Bronchitis is an infection of the air passages (bronchial tubes) in your lungs. It often occurs when you have a cold. This illness is contagious during the first few days and is spread through the air by coughing and sneezing, or by direct contact (touching the sick person and then touching your own eyes, nose, or mouth).  Symptoms of bronchitis include cough with mucus (phlegm) and low-grade fever. Bronchitis usually lasts 7 to 14 days. Mild cases can be treated with simple home remedies. More severe infection is treated with an antibiotic.  Home care  Follow these guidelines when caring for yourself at home:  · If your symptoms are severe, rest at home for the first 2 to 3 days. When you go back to your usual activities, don't let yourself get too tired.  · Do not smoke. Also avoid being exposed to secondhand smoke.  · You may use over-the-counter medicines to control fever or pain, unless another medicine was prescribed. (Note: If you have chronic liver or kidney disease or have ever had a stomach ulcer or gastrointestinal bleeding, talk with your healthcare provider before using these medicines. Also talk to your provider if you are taking medicine to prevent blood clots.)  Aspirin should never be given to anyone younger than 18 years of age who is ill with a viral infection or fever. It may cause severe liver or brain damage.  · Your appetite may be poor, so a light diet is fine. Avoid dehydration by drinking 6 to 8 glasses of fluids per day (such as water, soft drinks, sports drinks, juices, tea, or soup). Extra fluids will help loosen secretions in the nose and lungs.  · Over-the-counter cough, cold, and sore-throat medicines will not shorten the length of the illness, but they may be helpful to reduce symptoms. (Note: Do not use decongestants if you have high blood pressure.)  · Finish all antibiotic medicine. Do this even if you are feeling better after only a few days.  Follow-up care  Follow up with your healthcare provider, or as advised. If you had an X-ray or ECG (electrocardiogram), a specialist will review it. You will be notified of any new findings that may affect your care.  Note: If you are age 65 or older, or if you have a chronic lung disease or condition that affects your immune system, or you smoke, talk to your healthcare provider about having pneumococcal vaccinations and a yearly influenza vaccination (flu shot).  When to seek medical advice  Call your healthcare provider right away if any of these occur:  · Fever of 100.4°F (38°C) or higher  · Coughing up increased amounts of colored sputum  · Weakness, drowsiness, headache, facial pain, ear pain, or a stiff neck  Call 911, or get immediate medical care  Contact emergency services right away if any of these occur.  · Coughing up blood  · Worsening weakness, drowsiness, headache, or stiff neck  · Trouble breathing, wheezing, or pain with breathing  Date Last Reviewed: 9/13/2015 © 2000-2017 [employer]. [address] [place] [address], [place] [address]. All rights reserved. This information is not intended as a substitute for professional medical care. Always follow your healthcare professional's  instructions.        Middle Ear Infection (Adult)  You have an infection of the middle ear, the space behind the eardrum. This is also called acute otitis media (AOM). Sometimes it is caused by the common cold. This is because congestion can block the internal passage (eustachian tube) that drains fluid from the middle ear. When the middle ear fills with fluid, bacteria can grow there and cause an infection. Oral antibiotics are used to treat this illness, not ear drops. Symptoms usually start to improve within 1 to 2 days of treatment.    Home care  The following are general care guidelines:  · Finish all of the antibiotic medicine given, even though you may feel better after the first few days.  · You may use over-the-counter medicine, such as acetaminophen or ibuprofen, to control pain and fever, unless something else was prescribed. If you have chronic liver or kidney disease or have ever had a stomach ulcer or gastrointestinal bleeding, talk with your healthcare provider before using these medicines. Do not give aspirin to anyone under 18 years of age who has a fever. It may cause severe illness or death.  Follow-up care  Follow up with your healthcare provider, or as advised, in 2 weeks if all symptoms have not gotten better, or if hearing doesn't go back to normal within 1 month.  When to seek medical advice  Call your healthcare provider right away if any of these occur:  · Ear pain gets worse or does not improve after 3 days of treatment  · Unusual drowsiness or confusion  · Neck pain, stiff neck, or headache  · Fluid or blood draining from the ear canal  · Fever of 100.4°F (38°C) or as advised   · Seizure  Date Last Reviewed: 6/1/2016  © 5950-3545 [employer]. [address] [place] [address], [place] [address]. All rights reserved. This information is not intended as a substitute for professional medical care. Always follow your healthcare professional's instructions.        Self-Care for Sore  Throats    Sore throats happen for many reasons, such as colds, allergies, and infections caused by viruses or bacteria. In any case, your throat becomes red and sore. Your goal for self-care is to reduce your discomfort while giving your throat a chance to heal.  Moisten and soothe your throat  Tips include the following:  · Try a sip of water first thing after waking up.  · Keep your throat moist by drinking 6 or more glasses of clear liquids every day.  · Run a cool-air humidifier in your room overnight.  · Avoid cigarette smoke.   · Suck on throat lozenges, cough drops, hard candy, ice chips, or frozen fruit-juice bars. Use the sugar-free versions if your diet or medical condition requires them.  Gargle to ease irritation  Gargling every hour or 2 can ease irritation. Try gargling with 1 of these solutions:  · 1/4 teaspoon of salt in 1/2 cup of warm water  · An over-the-counter anesthetic gargle  Use medicine for more relief  Over-the-counter medicine can reduce sore throat symptoms. Ask your pharmacist if you have questions about which medicine to use:  · Ease pain with anesthetic sprays. Aspirin or an aspirin substitute also helps. Remember, never give aspirin to anyone 18 or younger, or if you are already taking blood thinners.   · For sore throats caused by allergies, try antihistamines to block the allergic reaction.  · Remember: unless a sore throat is caused by a bacterial infection, antibiotics wont help you.  Prevent future sore throats  Prevention tips include the following:  · Stop smoking or reduce contact with secondhand smoke. Smoke irritates the tender throat lining.  · Limit contact with pets and with allergy-causing substances, such as pollen and mold.  · When youre around someone with a sore throat or cold, wash your hands often to keep viruses or bacteria from spreading.  · Dont strain your vocal cords.  Call your healthcare provider  Contact your healthcare provider if you have:  · A  temperature over 101°F (38.3°C)  · White spots on the throat  · Great difficulty swallowing  · Trouble breathing  · A skin rash  · Recent exposure to someone else with strep bacteria  · Severe hoarseness and swollen glands in the neck or jaw   Date Last Reviewed: 8/1/2016  © 0803-0820 [employer]. [address] [place] [address], [place] [address]. All rights reserved. This information is not intended as a substitute for professional medical care. Always follow your healthcare professional's instructions.      Patient Instructions   -Below are suggestions for symptomatic relief:              -Tylenol every 4 hours OR ibuprofen every 6 hours as needed for pain/fever.              -Salt water gargles to soothe throat pain.              -Chloroseptic spray also helps to numb throat pain.              -Nasal saline spray reduces inflammation and dryness.              -Warm face compresses to help with facial sinus pain/pressure.              -Vicks vapor rub at night.              -Flonase OTC or Nasacort OTC for nasal congestion.              -Simple foods like chicken noodle soup.              -Delsym helps with coughing at night              -Zyrtec/Claritin during the day & Benadryl at night may help with allergies.                If you DO NOT have Hypertension or any history of palpitations, it is ok to take over the counter Sudafed or Mucinex D or Allegra-D or Claritin-D or Zyrtec-D.  If you do take one of the above, it is ok to combine that with plain over the counter Mucinex or Allegra or Claritin or Zyrtec. If, for example, you are taking Zyrtec -D, you can combine that with Mucinex, but not Mucinex-D.  If you are taking Mucinex-D, you can combine that with plain Allegra or Claritin or Zyrtec.   If you DO have Hypertension or palpitations, it is safe to take Coricidin HBP for relief of sinus symptoms.    Please follow up with your Primary care provider within 2-5 days if your signs and symptoms have not  resolved or worsen.     If your condition worsens or fails to improve we recommend that you receive another evaluation at the emergency room immediately or contact your primary medical clinic to discuss your concerns.   You must understand that you have received an Urgent Care treatment only and that you may be released before all of your medical problems are known or treated. You, the patient, will arrange for follow up care as instructed.     RED FLAGS/WARNING SYMPTOMS DISCUSSED WITH PATIENT THAT WOULD WARRANT EMERGENT MEDICAL ATTENTION. PATIENT VERBALIZED UNDERSTANDING.

## 2019-11-11 ENCOUNTER — NURSE TRIAGE (OUTPATIENT)
Dept: ADMINISTRATIVE | Facility: CLINIC | Age: 64
End: 2019-11-11

## 2019-11-11 ENCOUNTER — TELEPHONE (OUTPATIENT)
Dept: INTERNAL MEDICINE | Facility: CLINIC | Age: 64
End: 2019-11-11

## 2019-11-11 ENCOUNTER — PATIENT OUTREACH (OUTPATIENT)
Dept: ADMINISTRATIVE | Facility: HOSPITAL | Age: 64
End: 2019-11-11

## 2019-11-11 ENCOUNTER — OFFICE VISIT (OUTPATIENT)
Dept: URGENT CARE | Facility: CLINIC | Age: 64
End: 2019-11-11
Payer: MEDICARE

## 2019-11-11 VITALS
SYSTOLIC BLOOD PRESSURE: 121 MMHG | HEART RATE: 72 BPM | OXYGEN SATURATION: 96 % | HEIGHT: 62 IN | WEIGHT: 104 LBS | TEMPERATURE: 98 F | DIASTOLIC BLOOD PRESSURE: 101 MMHG | BODY MASS INDEX: 19.14 KG/M2 | RESPIRATION RATE: 18 BRPM

## 2019-11-11 DIAGNOSIS — R06.2 WHEEZING: ICD-10-CM

## 2019-11-11 DIAGNOSIS — J20.8 ACUTE BRONCHITIS, VIRAL: ICD-10-CM

## 2019-11-11 DIAGNOSIS — R05.9 COUGH: Primary | ICD-10-CM

## 2019-11-11 PROCEDURE — 71046 XR CHEST PA AND LATERAL: ICD-10-PCS | Mod: FY,S$GLB,, | Performed by: RADIOLOGY

## 2019-11-11 PROCEDURE — 3074F PR MOST RECENT SYSTOLIC BLOOD PRESSURE < 130 MM HG: ICD-10-PCS | Mod: CPTII,S$GLB,, | Performed by: NURSE PRACTITIONER

## 2019-11-11 PROCEDURE — 99214 OFFICE O/P EST MOD 30 MIN: CPT | Mod: 25,S$GLB,, | Performed by: NURSE PRACTITIONER

## 2019-11-11 PROCEDURE — 99214 PR OFFICE/OUTPT VISIT, EST, LEVL IV, 30-39 MIN: ICD-10-PCS | Mod: 25,S$GLB,, | Performed by: NURSE PRACTITIONER

## 2019-11-11 PROCEDURE — 71046 X-RAY EXAM CHEST 2 VIEWS: CPT | Mod: FY,S$GLB,, | Performed by: RADIOLOGY

## 2019-11-11 PROCEDURE — 3080F PR MOST RECENT DIASTOLIC BLOOD PRESSURE >= 90 MM HG: ICD-10-PCS | Mod: CPTII,S$GLB,, | Performed by: NURSE PRACTITIONER

## 2019-11-11 PROCEDURE — 3074F SYST BP LT 130 MM HG: CPT | Mod: CPTII,S$GLB,, | Performed by: NURSE PRACTITIONER

## 2019-11-11 PROCEDURE — 3080F DIAST BP >= 90 MM HG: CPT | Mod: CPTII,S$GLB,, | Performed by: NURSE PRACTITIONER

## 2019-11-11 PROCEDURE — 1101F PT FALLS ASSESS-DOCD LE1/YR: CPT | Mod: CPTII,S$GLB,, | Performed by: NURSE PRACTITIONER

## 2019-11-11 PROCEDURE — 94640 PR INHAL RX, AIRWAY OBST/DX SPUTUM INDUCT: ICD-10-PCS | Mod: S$GLB,,, | Performed by: NURSE PRACTITIONER

## 2019-11-11 PROCEDURE — 94640 AIRWAY INHALATION TREATMENT: CPT | Mod: S$GLB,,, | Performed by: NURSE PRACTITIONER

## 2019-11-11 PROCEDURE — 1101F PR PT FALLS ASSESS DOC 0-1 FALLS W/OUT INJ PAST YR: ICD-10-PCS | Mod: CPTII,S$GLB,, | Performed by: NURSE PRACTITIONER

## 2019-11-11 RX ORDER — PROMETHAZINE HYDROCHLORIDE AND DEXTROMETHORPHAN HYDROBROMIDE 6.25; 15 MG/5ML; MG/5ML
5 SYRUP ORAL 3 TIMES DAILY PRN
Qty: 120 ML | Refills: 0 | Status: SHIPPED | OUTPATIENT
Start: 2019-11-11 | End: 2019-11-21

## 2019-11-11 RX ORDER — IPRATROPIUM BROMIDE 0.5 MG/2.5ML
0.5 SOLUTION RESPIRATORY (INHALATION)
Status: COMPLETED | OUTPATIENT
Start: 2019-11-11 | End: 2019-11-11

## 2019-11-11 RX ORDER — ALBUTEROL SULFATE 1.25 MG/3ML
1.25 SOLUTION RESPIRATORY (INHALATION) EVERY 6 HOURS PRN
Qty: 1 BOX | Refills: 0 | Status: SHIPPED | OUTPATIENT
Start: 2019-11-11 | End: 2019-11-26 | Stop reason: SDUPTHER

## 2019-11-11 RX ORDER — ALBUTEROL SULFATE 0.83 MG/ML
2.5 SOLUTION RESPIRATORY (INHALATION)
Status: COMPLETED | OUTPATIENT
Start: 2019-11-11 | End: 2019-11-11

## 2019-11-11 RX ADMIN — ALBUTEROL SULFATE 2.5 MG: 0.83 SOLUTION RESPIRATORY (INHALATION) at 04:11

## 2019-11-11 RX ADMIN — IPRATROPIUM BROMIDE 0.5 MG: 0.5 SOLUTION RESPIRATORY (INHALATION) at 04:11

## 2019-11-11 NOTE — TELEPHONE ENCOUNTER
Pt thinks she has pneumonia, [place] [facility] [place], dx with bronchitis and was given abx and steroids, and she completed them, but the symptoms have not gone away, yet.  She is coughing a lot, occasionally coughs up clear mucous, no fever, but lungs hurt when she takes a deep breath or cough, and mild sob with exertion.  She rates it 7/10 in severity, and states no improvement with the use of ventolin inhaler.  No same day appts, offered her an appt today at [facility], she declined, states it too far away.  [place] [facility] [place] and [place] agrees she will go there.    Reason for Disposition   Intermittent chest pains persist > 3 days    Additional Information   Negative: SEVERE chest pain   Negative: Pain also present in shoulder(s) or arm(s) or jaw   Negative: Difficulty breathing   Negative: Cocaine use within last 3 days   Negative: History of prior 'blood clot' in leg or lungs (i.e., deep vein thrombosis, pulmonary embolism)   Negative: Recent illness requiring prolonged bed rest (i.e., immobilization)   Negative: Hip or leg fracture in past 2 months (e.g, or had cast on leg or ankle)   Negative: Major surgery in the past month   Negative: Recent long-distance travel with prolonged time in car, bus, plane, or train (i.e., within past 2 weeks; 6 or more hours duration)   Negative: Heart beating irregularly or very rapidly   Negative: Chest pain lasting longer than 5 minutes   Negative: Intermittent chest pain and pain has been increasing in severity or frequency   Negative: Dizziness or lightheadedness   Negative: Coughing up blood   Negative: Patient sounds very sick or weak to the triager   Negative: Fever > 100.5 F (38.1 C)   Negative: Severe difficulty breathing (e.g., struggling for each breath, speaks in single words)   Negative: Passed out (i.e., fainted, collapsed and was not responding)   Negative: Chest pain lasting longer than 5 minutes and ANY of the following:* Over 50  years old* Over 30 years old and at least one cardiac risk factor (i.e., high blood pressure, diabetes, high cholesterol, obesity, smoker or strong family history of heart disease)* Pain is crushing, pressure-like, or heavy * Took nitroglycerin and chest pain was not relieved* History of heart disease (i.e., angina, heart attack, bypass surgery, angioplasty, CHF)   Negative: Visible sweat on face or sweat dripping down face   Negative: Sounds like a life-threatening emergency to the triager   Negative: Followed an injury to chest    Protocols used: CHEST PAIN-A-[place]

## 2019-11-11 NOTE — TELEPHONE ENCOUNTER
Spoke to patient and patient is scheduled to see you on Thurs 11/14 at 9 am for same-day urgent appointment for further evaluation of sx.

## 2019-11-11 NOTE — TELEPHONE ENCOUNTER
Spoke to patient. Patient states that she was seen in  with Anna Deshpande PA-C on 11/03 in [facility]. Patient says that she was diagnosed with Bronchitis. Patient was prescribed Benzonatate for cough, Doxy 100 mg taken po BID and prednisone 10 mg taken daily. Patient says that the prednisone and abx are not effective at all. Pt is still having cough, nasal congestion and facial edema (localized on R side under R eye). Denies fever, chills and n/v. Patient is asking if she has to be seen again or if you could prescribe something stronger than?    Please advise.

## 2019-11-11 NOTE — PATIENT INSTRUCTIONS
Bronchitis with Wheezing (Viral or Bacterial: Adult)    Bronchitis is an infection of the air passages. It often occurs during a cold and is usually caused by a virus. Symptoms include cough with mucus (phlegm) and low-grade fever. This illness is contagious during the first few days and is spread through the air by coughing and sneezing, or by direct contact (touching the sick person and then touching your own eyes, nose, or mouth).  If there is a lot of inflammation, air flow is restricted. The air passages may also go into spasm, especially if you have asthma. This causes wheezing and difficulty breathing even in people who do not have asthma.  Bronchitis usually lasts 7 to 14 days. The wheezing should improve with treatment during the first week. An inhaler is often prescribed to relax the air passages and stop wheezing. Antibiotics will be prescribed if your doctor thinks there is also a secondary bacterial infection.  Home care  · If symptoms are severe, rest at home for the first 2 to 3 days. When you go back to your usual activities, don't let yourself get too tired.  · Do not smoke. Also avoid being exposed to secondhand smoke.  · You may use over-the-counter medicine to control fever or pain, unless another medicine was prescribed. Note: If you have chronic liver or kidney disease or have ever had a stomach ulcer or gastrointestinal bleeding, talk with your healthcare provider before using these medicines. Also talk to your provider if you are taking medicine to prevent blood clots.) Aspirin should never be given to anyone younger than 18 years of age who is ill with a viral infection or fever. It may cause severe liver or brain damage.  · Your appetite may be poor, so a light diet is fine. Avoid dehydration by drinking 6 to 8 glasses of fluids per day (such as water, soft drinks, sports drinks, juices, tea, or soup). Extra fluids will help loosen secretions in the nose and lungs.  · Over-the-counter  cough, cold, and sore-throat medicines will not shorten the length of the illness, but they may be helpful to reduce symptoms. (Note: Do not use decongestants if you have high blood pressure.)  · If you were given an inhaler, use it exactly as directed. If you need to use it more often than prescribed, your condition may be worsening. If this happens, contact your healthcare provider.  · If prescribed, finish all antibiotic medicine, even if you are feeling better after only a few days.  Follow-up care  Follow up with your healthcare provider, or as advised. If you had an X-ray or ECG (electrocardiogram), a specialist will review it. You will be notified of any new findings that may affect your care.  Note: If you are age 65 or older, or if you have a chronic lung disease or condition that affects your immune system, or you smoke, talk to your healthcare provider about having a pneumococcal vaccinations and a yearly influenza vaccination (flu shot).  When to seek medical advice  Call your healthcare provider right away if any of these occur:  · Fever of 100.4°F (38°C) or higher  · Coughing up increasing amounts of colored sputum  · Weakness, drowsiness, headache, facial pain, ear pain, or a stiff neck  Call 911, or get immediate medical care  Contact emergency services right away if any of these occur.  · Coughing up blood  · Worsening weakness, drowsiness, headache, or stiff neck  · Increased wheezing not helped with medication, shortness of breath, or pain with breathing  Date Last Reviewed: 9/13/2015  © 0234-6270 [employer]. [address] [place] [address], [place] [address]. All rights reserved. This information is not intended as a substitute for professional medical care. Always follow your healthcare professional's instructions.

## 2019-11-11 NOTE — PROGRESS NOTES
"Subjective:       Patient ID: Bhavna Landry is a 64 y.o. female.    Vitals:  height is 5' 2" (1.575 m) and weight is 47.2 kg (104 lb). Her temperature is 98.3 °F (36.8 °C). Her blood pressure is 121/101 (abnormal) and her pulse is 72. Her respiration is 18 and oxygen saturation is 96%.     Chief Complaint: Cough    Patient was seen in clinic on 11/03.  She was diagnosed with ear infection and bronchitis.  She was prescribed prednisone and doxycycline.  She states she is not getting any better.  She reports wheezing and mucus production.  Patient reports pain in chest when she coughs.     Cough   This is a recurrent problem. The current episode started 1 to 4 weeks ago (2 Weeks Ago ). The problem has been gradually worsening. The problem occurs constantly. The cough is non-productive. Associated symptoms include chills, shortness of breath and wheezing. Pertinent negatives include no ear pain, eye redness, fever, hemoptysis, myalgias, rash or sore throat. The symptoms are aggravated by lying down. Treatments tried: Prednisone and Antibiotic  The treatment provided moderate relief.       Constitution: Positive for chills. Negative for sweating, fatigue and fever.   HENT: Positive for congestion. Negative for ear pain, sinus pain, sinus pressure, sore throat and voice change.    Neck: Negative for painful lymph nodes.   Eyes: Negative for eye redness.   Respiratory: Positive for cough, shortness of breath and wheezing. Negative for chest tightness, sputum production, bloody sputum, COPD, stridor and asthma.    Gastrointestinal: Negative for nausea and vomiting.   Musculoskeletal: Negative for muscle ache.   Skin: Negative for rash.   Allergic/Immunologic: Negative for seasonal allergies and asthma.   Hematologic/Lymphatic: Negative for swollen lymph nodes.       Objective:      Physical Exam   Constitutional: She is oriented to person, place, and time. She appears well-developed and well-nourished. She is cooperative. "  Non-toxic appearance. She does not have a sickly appearance. She does not appear ill. No distress.   HENT:   Head: Normocephalic and atraumatic.   Right Ear: Hearing, tympanic membrane, external ear and ear canal normal.   Left Ear: Hearing, external ear and ear canal normal. Tympanic membrane is erythematous.   Ears:    Nose: Nose normal. No mucosal edema, rhinorrhea or nasal deformity. No epistaxis. Right sinus exhibits no maxillary sinus tenderness and no frontal sinus tenderness. Left sinus exhibits no maxillary sinus tenderness and no frontal sinus tenderness.   Mouth/Throat: Uvula is midline, oropharynx is clear and moist and mucous membranes are normal. No trismus in the jaw. Normal dentition. No uvula swelling. No oropharyngeal exudate, posterior oropharyngeal edema or posterior oropharyngeal erythema.   Resolving OM left ear   Eyes: Conjunctivae and lids are normal. No scleral icterus.   Neck: Trachea normal, full passive range of motion without pain and phonation normal. Neck supple. No neck rigidity. No edema and no erythema present.   Cardiovascular: Normal rate, regular rhythm, normal heart sounds, intact distal pulses and normal pulses.   Pulmonary/Chest: Effort normal. No accessory muscle usage. No respiratory distress. She has no decreased breath sounds. She has wheezes in the right upper field and the left lower field. She has rhonchi in the right middle field.   Abdominal: Normal appearance.   Musculoskeletal: Normal range of motion. She exhibits no edema or deformity.   Neurological: She is alert and oriented to person, place, and time. She exhibits normal muscle tone. Coordination normal.   Skin: Skin is warm, dry, intact, not diaphoretic and not pale.   Psychiatric: She has a normal mood and affect. Her speech is normal and behavior is normal. Judgment and thought content normal. Cognition and memory are normal.   Nursing note and vitals reviewed.    CXR  Impression       No failure or  pneumonia or nodule.  Hyperinflation unchanged.     Pt reports feeling better after breathing treatment.        Assessment:       1. Cough    2. Wheezing        Plan:       Pt will start nebulizer treatments.  RTC if develops fever.    Cough  -     XR CHEST PA AND LATERAL; Future; Expected date: 11/11/2019  -     ipratropium 0.02 % nebulizer solution 0.5 mg  -     albuterol nebulizer solution 2.5 mg    Wheezing  -     XR CHEST PA AND LATERAL; Future; Expected date: 11/11/2019  -     ipratropium 0.02 % nebulizer solution 0.5 mg  -     albuterol nebulizer solution 2.5 mg         Patient Instructions     Bronchitis with Wheezing (Viral or Bacterial: Adult)    Bronchitis is an infection of the air passages. It often occurs during a cold and is usually caused by a virus. Symptoms include cough with mucus (phlegm) and low-grade fever. This illness is contagious during the first few days and is spread through the air by coughing and sneezing, or by direct contact (touching the sick person and then touching your own eyes, nose, or mouth).  If there is a lot of inflammation, air flow is restricted. The air passages may also go into spasm, especially if you have asthma. This causes wheezing and difficulty breathing even in people who do not have asthma.  Bronchitis usually lasts 7 to 14 days. The wheezing should improve with treatment during the first week. An inhaler is often prescribed to relax the air passages and stop wheezing. Antibiotics will be prescribed if your doctor thinks there is also a secondary bacterial infection.  Home care  · If symptoms are severe, rest at home for the first 2 to 3 days. When you go back to your usual activities, don't let yourself get too tired.  · Do not smoke. Also avoid being exposed to secondhand smoke.  · You may use over-the-counter medicine to control fever or pain, unless another medicine was prescribed. Note: If you have chronic liver or kidney disease or have ever had a stomach  ulcer or gastrointestinal bleeding, talk with your healthcare provider before using these medicines. Also talk to your provider if you are taking medicine to prevent blood clots.) Aspirin should never be given to anyone younger than 18 years of age who is ill with a viral infection or fever. It may cause severe liver or brain damage.  · Your appetite may be poor, so a light diet is fine. Avoid dehydration by drinking 6 to 8 glasses of fluids per day (such as water, soft drinks, sports drinks, juices, tea, or soup). Extra fluids will help loosen secretions in the nose and lungs.  · Over-the-counter cough, cold, and sore-throat medicines will not shorten the length of the illness, but they may be helpful to reduce symptoms. (Note: Do not use decongestants if you have high blood pressure.)  · If you were given an inhaler, use it exactly as directed. If you need to use it more often than prescribed, your condition may be worsening. If this happens, contact your healthcare provider.  · If prescribed, finish all antibiotic medicine, even if you are feeling better after only a few days.  Follow-up care  Follow up with your healthcare provider, or as advised. If you had an X-ray or ECG (electrocardiogram), a specialist will review it. You will be notified of any new findings that may affect your care.  Note: If you are age 65 or older, or if you have a chronic lung disease or condition that affects your immune system, or you smoke, talk to your healthcare provider about having a pneumococcal vaccinations and a yearly influenza vaccination (flu shot).  When to seek medical advice  Call your healthcare provider right away if any of these occur:  · Fever of 100.4°F (38°C) or higher  · Coughing up increasing amounts of colored sputum  · Weakness, drowsiness, headache, facial pain, ear pain, or a stiff neck  Call 911, or get immediate medical care  Contact emergency services right away if any of these occur.  · Coughing up  blood  · Worsening weakness, drowsiness, headache, or stiff neck  · Increased wheezing not helped with medication, shortness of breath, or pain with breathing  Date Last Reviewed: 9/13/2015  © 6991-7294 [employer]. [address] [place] [address], [place] [address]. All rights reserved. This information is not intended as a substitute for professional medical care. Always follow your healthcare professional's instructions.

## 2019-11-11 NOTE — TELEPHONE ENCOUNTER
----- Message from Patty Khanna sent at 11/11/2019  8:18 AM CST -----  Contact: Patient [phone]  Patient went to urgent care about a week and half ago. States she got 3 petazone pills and a few antibiotics. She has bronchitis. But it haven't gone away, she has no fever. Requesting a rx to be sent. She is still coughing and wheezing.      [id number]  [place] [address] AT [facility] [address] [phone] (Phone)  [phone] (Fax)    Please call and advise.    Thank You

## 2019-11-12 ENCOUNTER — TELEPHONE (OUTPATIENT)
Dept: INTERNAL MEDICINE | Facility: CLINIC | Age: 64
End: 2019-11-12

## 2019-11-12 RX ORDER — PREDNISONE 20 MG/1
TABLET ORAL
Qty: 10 TABLET | Refills: 0 | Status: SHIPPED | OUTPATIENT
Start: 2019-11-12 | End: 2020-02-03

## 2019-11-12 NOTE — TELEPHONE ENCOUNTER
----- Message from Etienne Sood sent at 11/12/2019 11:09 AM CST -----  Contact: Patient [phone]  Patient would like to get medical advice.    Comments: Patient when to ER yesterday and was given a breathing treatment, also was prescribed a cough Rx and Nebulizer. The provider stating patient has virus in lungs, took chest xray, will not be coming in Thursday at 9 am, does not have a ride, has a low grade fever, doing much better with breathing treatment. albuterol (ACCUNEB) 1.25 mg/3 mL Nebu, has 25 of them to take, stating the inhaler does not do anything.    Please call an advise  Thank you

## 2019-11-15 ENCOUNTER — HOSPITAL ENCOUNTER (EMERGENCY)
Facility: HOSPITAL | Age: 64
Discharge: HOME | End: 2019-11-15
Attending: EMERGENCY MEDICINE
Payer: MEDICARE

## 2019-11-15 ENCOUNTER — TELEPHONE (OUTPATIENT)
Dept: INTERNAL MEDICINE | Facility: CLINIC | Age: 64
End: 2019-11-15

## 2019-11-15 VITALS
SYSTOLIC BLOOD PRESSURE: 115 MMHG | HEART RATE: 70 BPM | DIASTOLIC BLOOD PRESSURE: 54 MMHG | OXYGEN SATURATION: 97 % | RESPIRATION RATE: 17 BRPM | HEIGHT: 62 IN | WEIGHT: 104 LBS | BODY MASS INDEX: 19.14 KG/M2

## 2019-11-15 DIAGNOSIS — R06.02 SHORTNESS OF BREATH: ICD-10-CM

## 2019-11-15 DIAGNOSIS — R06.02 SOB (SHORTNESS OF BREATH): ICD-10-CM

## 2019-11-15 DIAGNOSIS — I48.91 ATRIAL FIBRILLATION WITH RVR: Primary | ICD-10-CM

## 2019-11-15 LAB
ALBUMIN SERPL BCP-MCNC: 3.6 G/DL (ref 3.5–5.2)
ALP SERPL-CCNC: 71 U/L (ref 55–135)
ALT SERPL W/O P-5'-P-CCNC: 26 U/L (ref 10–44)
ANION GAP SERPL CALC-SCNC: 10 MMOL/L (ref 8–16)
AST SERPL-CCNC: 30 U/L (ref 10–40)
BASOPHILS # BLD AUTO: 0.01 K/UL (ref 0–0.2)
BASOPHILS NFR BLD: 0.1 % (ref 0–1.9)
BILIRUB SERPL-MCNC: 0.5 MG/DL (ref 0.1–1)
BNP SERPL-MCNC: 43 PG/ML (ref 0–99)
BUN SERPL-MCNC: 5 MG/DL (ref 8–23)
CALCIUM SERPL-MCNC: 9.8 MG/DL (ref 8.7–10.5)
CHLORIDE SERPL-SCNC: 101 MMOL/L (ref 95–110)
CO2 SERPL-SCNC: 21 MMOL/L (ref 23–29)
CREAT SERPL-MCNC: 0.9 MG/DL (ref 0.5–1.4)
DIFFERENTIAL METHOD: ABNORMAL
EOSINOPHIL # BLD AUTO: 0 K/UL (ref 0–0.5)
EOSINOPHIL NFR BLD: 0.1 % (ref 0–8)
ERYTHROCYTE [DISTWIDTH] IN BLOOD BY AUTOMATED COUNT: 13.4 % (ref 11.5–14.5)
EST. GFR  (AFRICAN AMERICAN): >60 ML/MIN/1.73 M^2
EST. GFR  (NON AFRICAN AMERICAN): >60 ML/MIN/1.73 M^2
GLUCOSE SERPL-MCNC: 135 MG/DL (ref 70–110)
HCT VFR BLD AUTO: 37.1 % (ref 37–48.5)
HGB BLD-MCNC: 12.2 G/DL (ref 12–16)
LYMPHOCYTES # BLD AUTO: 1.3 K/UL (ref 1–4.8)
LYMPHOCYTES NFR BLD: 13.8 % (ref 18–48)
MCH RBC QN AUTO: 31.9 PG (ref 27–31)
MCHC RBC AUTO-ENTMCNC: 32.9 G/DL (ref 32–36)
MCV RBC AUTO: 97 FL (ref 82–98)
MONOCYTES # BLD AUTO: 0.4 K/UL (ref 0.3–1)
MONOCYTES NFR BLD: 3.7 % (ref 4–15)
NEUTROPHILS # BLD AUTO: 7.7 K/UL (ref 1.8–7.7)
NEUTROPHILS NFR BLD: 82.3 % (ref 38–73)
PLATELET # BLD AUTO: 357 K/UL (ref 150–350)
PMV BLD AUTO: 8.2 FL (ref 9.2–12.9)
POTASSIUM SERPL-SCNC: 4.9 MMOL/L (ref 3.5–5.1)
PROT SERPL-MCNC: 5.7 G/DL (ref 6–8.4)
RBC # BLD AUTO: 3.83 M/UL (ref 4–5.4)
SODIUM SERPL-SCNC: 132 MMOL/L (ref 136–145)
TROPONIN I SERPL DL<=0.01 NG/ML-MCNC: 0.02 NG/ML (ref 0–0.03)
TSH SERPL DL<=0.005 MIU/L-ACNC: 2 UIU/ML (ref 0.4–4)
WBC # BLD AUTO: 9.39 K/UL (ref 3.9–12.7)

## 2019-11-15 PROCEDURE — 80053 COMPREHEN METABOLIC PANEL: CPT

## 2019-11-15 PROCEDURE — 93005 ELECTROCARDIOGRAM TRACING: CPT

## 2019-11-15 PROCEDURE — 93010 EKG 12-LEAD: ICD-10-PCS | Mod: ,,, | Performed by: STUDENT IN AN ORGANIZED HEALTH CARE EDUCATION/TRAINING PROGRAM

## 2019-11-15 PROCEDURE — 25000003 PHARM REV CODE 250: Performed by: EMERGENCY MEDICINE

## 2019-11-15 PROCEDURE — 96374 THER/PROPH/DIAG INJ IV PUSH: CPT

## 2019-11-15 PROCEDURE — 83880 ASSAY OF NATRIURETIC PEPTIDE: CPT

## 2019-11-15 PROCEDURE — 84484 ASSAY OF TROPONIN QUANT: CPT

## 2019-11-15 PROCEDURE — 84443 ASSAY THYROID STIM HORMONE: CPT

## 2019-11-15 PROCEDURE — 99291 CRITICAL CARE FIRST HOUR: CPT | Mod: 25

## 2019-11-15 PROCEDURE — 85025 COMPLETE CBC W/AUTO DIFF WBC: CPT

## 2019-11-15 PROCEDURE — 93010 ELECTROCARDIOGRAM REPORT: CPT | Mod: ,,, | Performed by: STUDENT IN AN ORGANIZED HEALTH CARE EDUCATION/TRAINING PROGRAM

## 2019-11-15 RX ORDER — DILTIAZEM HYDROCHLORIDE 5 MG/ML
15 INJECTION INTRAVENOUS
Status: COMPLETED | OUTPATIENT
Start: 2019-11-15 | End: 2019-11-15

## 2019-11-15 RX ORDER — METOPROLOL SUCCINATE 25 MG/1
12.5 TABLET, EXTENDED RELEASE ORAL DAILY
Qty: 30 TABLET | Refills: 3 | Status: SHIPPED | OUTPATIENT
Start: 2019-11-15 | End: 2020-04-22 | Stop reason: SDUPTHER

## 2019-11-15 RX ORDER — DILTIAZEM HYDROCHLORIDE 5 MG/ML
20 INJECTION INTRAVENOUS
Status: DISCONTINUED | OUTPATIENT
Start: 2019-11-15 | End: 2019-11-15 | Stop reason: HOSPADM

## 2019-11-15 RX ADMIN — DILTIAZEM HYDROCHLORIDE 15 MG: 5 INJECTION INTRAVENOUS at 10:11

## 2019-11-15 NOTE — ED NOTES
MD at bedside for assessment.  Administered Caridzem and Md stated to watch for 15 min and will reassess HR

## 2019-11-15 NOTE — ED PROVIDER NOTES
Encounter Date: 11/15/2019    SCRIBE #1 NOTE: I, Michelle Ahumada, am scribing for, and in the presence of,  Dr. Madrid. I have scribed the entire note.       History     Chief Complaint   Patient presents with    Shortness of Breath     Patient has AFIB 's new onset and not of blood thinners.      Time seen by provider: 10:38 AM    This is a 64 y.o. female with a history of HTN, HLD, and fibromyalgia who presents to the ED via EMS with complaint of shortness of breath since 8:30 AM this morning. Patient says she was at the dentist office when she suddenly felt short of breath. She reports to have bronchitis and started taking 10 MG Prednisone b.i.d. 3 days ago. Per EMS, patient has A Fib RVR with a heart rate in the 190's. Patient does not have a history of A fib. Patient says she feels better in the ED and says she's only feeling congested. She denies chest pain, nausea, vomiting, or any other complaints at this time. Patient says she took an aspirin this morning.    The history is provided by the patient and the EMS personnel.     Review of patient's allergies indicates:   Allergen Reactions    Etanercept Other (See Comments)     Other reaction(s): recurrent infections    Chloramphenicol sod succinate Hives and Other (See Comments)    Codeine Other (See Comments)     Other reaction(s): Stomach upset    Nickel sutures [surgical stainless steel] Dermatitis     Allergic contact dermatitis    Adhesive Rash     Other reaction(s): Rash     Past Medical History:   Diagnosis Date    Allergy     Amblyopia     Anemia     Anticoagulant long-term use     Arthritis 02/02/1992    Cataract     Depression     Dry eyes     Dry mouth     Duane's syndrome of right eye     Fibromyalgia 4/17/2014    Fibromyalgia     GERD (gastroesophageal reflux disease)     History of psychiatric hospitalization     Hyperlipidemia 02/02/1992    Hypertension     Kidney stone     Migraine headache     Osteoporosis      Psoriatic arthritis 1992    Right knee pain     post knee replacement surgery (possible rejectiion of metal)    RLS (restless legs syndrome)     Schizophrenia 1992    stable on meds    Urinary tract infection      Past Surgical History:   Procedure Laterality Date    brow ptosis repair Right 2019    Surgeon: Dr. Karla Henson    CATARACT EXTRACTION       SECTION      COLONOSCOPY N/A 2016    Procedure: COLONOSCOPY;  Surgeon: ANDRIA Connelly MD;  Location: [facility] ([address] [place] [address]);  Service: Endoscopy;  Laterality: N/A;    cyst removed from right sinus  1982    HYSTERECTOMY      VAGINAL HYSTERECTOMY WITHOUT BSO - ENDOMETRIOSIS    INJECTION OF ANESTHETIC AGENT AROUND MEDIAL BRANCH NERVES INNERVATING LUMBAR FACET JOINT N/A 2019    Procedure: Lumbo-sacral Block, DR5 and Lateral Branches of S1,S2, S3;  Surgeon: Michelle Pineda Jr., MD;  Location: [facility] PAIN MGT;  Service: Pain Management;  Laterality: N/A;  Pt takes  and states she holds ASA on her own whenever she has procedures.  Instructed to hold x 3 days prior to procedure.      INJECTION OF ANESTHETIC AGENT INTO SACROILIAC JOINT Right 2018    Procedure: BLOCK, SACROILIAC JOINT-Right- ORAL SEDATION;  Surgeon: Michelle Pineda Jr., MD;  Location: [facility] PAIN [facility]GT;  Service: Pain Management;  Laterality: Right;    INJECTION OF ANESTHETIC AGENT INTO SACROILIAC JOINT Bilateral 2018    Procedure: BLOCK, SACROILIAC JOINT-BILATERAL;  Surgeon: Michelle Pineda Jr., MD;  Location: [facility]IN [facility];  Service: Pain Management;  Laterality: Bilateral;  Please keep at 10:00 due to trasnsportation    INJECTION OF ANESTHETIC AGENT INTO SACROILIAC JOINT Bilateral 2019    Procedure: Bilateral Sacroiliac Joint Injection - Per Dr Pineda, not necessary to hold ASA.;  Surgeon: Michelle Pineda Jr., MD;  Location: [facility]IN [facility];  Service: Pain Management;  Laterality: Bilateral;    INTRAOCULAR PROSTHESES INSERTION  Right 8/1/2019    Procedure: INSERTION, IOL PROSTHESIS;  Surgeon: Karen Song MD;  [facility];  Service: Ophthalmology;  Laterality: Right;    INTRAOCULAR PROSTHESES INSERTION Left 9/26/2019    Procedure: INSERTION, IOL PROSTHESIS;  Surgeon: Karen Song MD;  Location: [facility] OR [address] [place] [address];  Service: Ophthalmology;  Laterality: Left;    JOINT REPLACEMENT Right     knee    KNEE SURGERY      PHACOEMULSIFICATION OF CATARACT Right 8/1/2019    Procedure: PHACOEMULSIFICATION, CATARACT;  Surgeon: Karen Song MD;  Location: [facility] OR [address] [place] [address];  Service: Ophthalmology;  Laterality: Right;    PHACOEMULSIFICATION OF CATARACT Left 9/26/2019    Procedure: PHACOEMULSIFICATION, CATARACT;  Surgeon: Karen Song MD;  Location: [facility] OR [address] [place] [address];  Service: Ophthalmology;  Laterality: Left;    RADIOFREQUENCY THERMOCOAGULATION Right 5/7/2019    Procedure: RADIOFREQUENCY THERMAL COAGULATION RIGHT DORSAL RAMUS 5 AND LATERAL BRANCH OF S1, S2 AND S3;  Surgeon: Michelle Pineda Jr., MD;  Location: [facility] PAIN [facility]GT;  Service: Pain Management;  Laterality: Right;    RADIOFREQUENCY THERMOCOAGULATION Left 5/14/2019    Procedure: RADIOFREQUENCY THERMAL COAGULATION LEFT DORSAL RAMUS 5 AND LATERAL BRANCH OF S1,S2 AND S3;  Surgeon: Michelle Pineda Jr., MD;  Location: [facility] PAIN [facility]GT;  Service: Pain Management;  Laterality: Left;    RADIOFREQUENCY THERMOCOAGULATION Right 10/22/2019    Procedure: RADIOFREQUENCY THERMAL COAGULATION---DARSAL RAMUS 5 and LATERAL S1,S2,and S3 Right;  Surgeon: Michelle Pineda Jr., MD;  Location: [facility] PAIN [facility]GT;  Service: Pain Management;  Laterality: Right;  patient to sign consent DOS    RADIOFREQUENCY THERMOCOAGULATION Left 10/29/2019    Procedure: RADIOFREQUENCY THERMAL COAGULATION - LEFT - DR5, S1,S2, AND S3;  Surgeon: Michelle Pineda Jr., MD;  Location: [facility]IN [facility];  Service: Pain Management;  Laterality: Left;    Surgery on right knee  07/09/1982    tumor removed from back  left side upper shoulder  2006     Family History   Problem Relation Age of Onset    Colon cancer Mother     Psoriasis Mother     Cancer Mother         colon    Depression Mother     Diabetes Brother     Arthritis Brother     Heart disease Brother         cad    Cancer Father         lymphoma    Stroke Sister     No Known Problems Daughter     Hypertension Neg Hx     Suicide Neg Hx     Amblyopia Neg Hx     Blindness Neg Hx     Cataracts Neg Hx     Glaucoma Neg Hx     Macular degeneration Neg Hx     Retinal detachment Neg Hx     Strabismus Neg Hx      Social History     Tobacco Use    Smoking status: Former Smoker     Packs/day: 0.50     Years: 10.00     Pack years: 5.00     Types: Cigarettes     Last attempt to quit: 2012     Years since quittin.4    Smokeless tobacco: Former User    Tobacco comment: stopped smoking 2017.     Substance Use Topics    Alcohol use: No    Drug use: No     Review of Systems   HENT: Positive for congestion.    Respiratory: Positive for shortness of breath.    All other systems reviewed and are negative.      Physical Exam     Initial Vitals [11/15/19 1031]   BP Pulse Resp Temp SpO2   120/61 (!) 208 (!) 28 -- 95 %      MAP       --         Physical Exam    Nursing note and vitals reviewed.  Constitutional: She appears well-developed and well-nourished. No distress.   HENT:   Head: Normocephalic and atraumatic.   Mouth/Throat: Oropharynx is clear and moist.   Eyes: Conjunctivae and EOM are normal. Pupils are equal, round, and reactive to light.   Neck: Normal range of motion. Neck supple. No stridor present. No tracheal deviation present.   Cardiovascular:   Murmur heard.  Irregularly irregular, tachycardia, systolic ejection murmur   Pulmonary/Chest: Breath sounds normal. No respiratory distress.   Abdominal: Soft. Bowel sounds are normal. There is no tenderness. There is no rebound and no guarding.   Musculoskeletal: Normal range of motion. She  exhibits no edema or tenderness.   Neurological: She is alert and oriented to person, place, and time. No sensory deficit.   Skin: Skin is warm and dry. Capillary refill takes less than 2 seconds.   Psychiatric: She has a normal mood and affect. Her behavior is normal.         ED Course   Critical Care  Date/Time: 11/15/2019 12:23 PM  Performed by: Caden Madrid MD  Authorized by: Caden Madrid MD   Direct patient critical care time: 60 minutes  Total critical care time (exclusive of procedural time) : 60 minutes  Critical care was time spent personally by me on the following activities: blood draw for specimens, development of treatment plan with patient or surrogate, discussions with consultants, interpretation of cardiac output measurements, evaluation of patient's response to treatment, examination of patient, obtaining history from patient or surrogate, ordering and performing treatments and interventions, ordering and review of laboratory studies, ordering and review of radiographic studies, pulse oximetry and re-evaluation of patient's condition.        Labs Reviewed   CBC W/ AUTO DIFFERENTIAL - Abnormal; Notable for the following components:       Result Value    RBC 3.83 (*)     Mean Corpuscular Hemoglobin 31.9 (*)     Platelets 357 (*)     MPV 8.2 (*)     Gran% 82.3 (*)     Lymph% 13.8 (*)     Mono% 3.7 (*)     All other components within normal limits   COMPREHENSIVE METABOLIC PANEL - Abnormal; Notable for the following components:    Sodium 132 (*)     CO2 21 (*)     Glucose 135 (*)     BUN, Bld 5 (*)     Total Protein 5.7 (*)     All other components within normal limits   TROPONIN I   B-TYPE NATRIURETIC PEPTIDE   TSH     EKG Readings: (Independently Interpreted)   Atrial fibrillation, rate 184, LAD, no ST elevations, no STEMI.     ECG Results          EKG 12-lead (In process)  Result time 11/15/19 12:23:21    In process by Interface, Lab In [facility] (11/15/19 12:23:21)                  Narrative:    Test Reason : R06.02,    Vent. Rate : 184 BPM     Atrial Rate : 163 BPM     P-R Int : 000 ms          QRS Dur : 068 ms      QT Int : 204 ms       P-R-T Axes : 000 -32 087 degrees     QTc Int : 357 ms    Atrial fibrillation with rapid ventricular response  Left axis deviation  Anteroseptal infarct (cited on or before 10-DEC-2018)  Abnormal ECG  When compared with ECG of 10-DEC-2018 22:49,  Significant changes have occurred    Referred By: [email or web address]   SELF           Confirmed By:                             X-Rays:   Independently Interpreted Readings:   Other Readings:  Reviewed by myself, read by radiology.    Imaging Results          X-Ray Chest 1 View (Final result)  Result time 11/15/19 11:19:48    Final result by Henok Damon MD (11/15/19 11:19:48)                 Impression:      As above.      Electronically signed by: Henok Damon  Date:    11/15/2019  Time:    11:19             Narrative:    EXAMINATION:  XR CHEST 1 VIEW    CLINICAL HISTORY:  Shortness of breath    TECHNIQUE:  Single frontal view of the chest was performed.    COMPARISON:  11/11/2019    FINDINGS:  Lungs appear somewhat hyperinflated with coarsened interstitial attenuation.  No lobar consolidation, pleural effusion, or pneumothorax.  Cardiomediastinal silhouette is unchanged.  No acute osseous abnormality.  Slight levocurvature of the thoracolumbar spine.                              Medical Decision Making:   Clinical Tests:   Lab Tests: Ordered and Reviewed  Radiological Study: Reviewed and Ordered  Medical Tests: Ordered and Reviewed  ED Management:  64-year-old female with shortness of breath.  Patient presents in atrial fibrillation with RVR.  She was given 15 mg of Cardizem intravenously which converted her to a normal sinus rhythm. Lab work is unremarkable as well as chest x-ray.  She has had no chest pain this morning.  Case was discussed with Cardiology on-call, Dr. Sheldon.  Patient will follow up with  him.  I will start her on Toprol 12.5 mg once a day.  Patient will return to the ED for any recurrence of symptoms.                                 Clinical Impression:     1. Atrial fibrillation with RVR    2. SOB (shortness of breath)    3. Shortness of breath           I, Dr. Caden Madrid, personally performed the services described in this documentation. All medical record entries made by the scribe were at my direction and in my presence. I have reviewed the chart and agree that the record reflects my personal performance and is accurate and complete. Caden Madrid MD.  1:51 PM 11/15/2019                 Caden Madrid MD  11/15/19 [id number]

## 2019-11-15 NOTE — TELEPHONE ENCOUNTER
She should take the metoprolol succinate 25 mg in addition to her other Blood pressure medications

## 2019-11-15 NOTE — ED NOTES
Patient Heart rate decreased to 81 and is remaining at rate prior to administration of medication. Medication on hold and will continue to monitor.

## 2019-11-15 NOTE — ED NOTES
Patient was brought in via ems from home for cc of SOB. EMS reported that upon EKG the patient was in Afib with RVR. Patient does not have history of Afib. EMS administered 10 mg of Cardizem and 200 ml of IVF for low BP and HR in 190's. Patient arrived to ED awake alert oriented x4, verbal and breathing to RA. Chest rise and fall symmetrical and unlabored. Patient does have a productive congested cough, breath sounds clear in all fields. Patient did state that she felt slightly SOB this am when she went went to dentist and it got worse when she got home. Patient stated that she called 911 when she got home from dentist appt. Patient placed on cardiac monitor and HR is 204 with rhythm of Afib. MD notified of patient arrival and EKG performed. Patient pulse ox low and placed on 2L/NC. BP stable and WNL.

## 2019-11-15 NOTE — TELEPHONE ENCOUNTER
----- Message from Lilly Rowland sent at 11/15/2019  2:09 PM CST -----  Contact: [phone]  Patient is requesting a call from the office. She stated she went to the hospital this morning and MD gave her a medication for her heart and blood pressure - metoprolol succinate (TOPROL-XL) 25 MG 24 hr tablet. She wants to know if she should stop the blood pressure medication that  prescribed her?  Please advise, thanks.

## 2019-11-17 RX ORDER — AMLODIPINE BESYLATE 5 MG/1
TABLET ORAL
Qty: 30 TABLET | Refills: 11 | Status: SHIPPED | OUTPATIENT
Start: 2019-11-17 | End: 2020-06-29 | Stop reason: DRUGHIGH

## 2019-11-18 ENCOUNTER — PATIENT OUTREACH (OUTPATIENT)
Dept: ADMINISTRATIVE | Facility: OTHER | Age: 64
End: 2019-11-18

## 2019-11-20 ENCOUNTER — OFFICE VISIT (OUTPATIENT)
Dept: CARDIOLOGY | Facility: CLINIC | Age: 64
End: 2019-11-20
Payer: MEDICARE

## 2019-11-20 VITALS — OXYGEN SATURATION: 94 % | BODY MASS INDEX: 18.7 KG/M2 | HEIGHT: 62 IN | HEART RATE: 86 BPM | WEIGHT: 101.63 LBS

## 2019-11-20 DIAGNOSIS — I48.0 PAF (PAROXYSMAL ATRIAL FIBRILLATION): Primary | ICD-10-CM

## 2019-11-20 DIAGNOSIS — I10 ESSENTIAL HYPERTENSION: Chronic | ICD-10-CM

## 2019-11-20 DIAGNOSIS — E78.5 HYPERLIPIDEMIA, UNSPECIFIED HYPERLIPIDEMIA TYPE: ICD-10-CM

## 2019-11-20 DIAGNOSIS — I65.23 BILATERAL CAROTID ARTERY STENOSIS: ICD-10-CM

## 2019-11-20 DIAGNOSIS — I48.91 ATRIAL FIBRILLATION, UNSPECIFIED TYPE: Primary | ICD-10-CM

## 2019-11-20 PROBLEM — I77.9 BILATERAL CAROTID ARTERY DISEASE: Status: ACTIVE | Noted: 2019-11-20

## 2019-11-20 PROCEDURE — 99204 PR OFFICE/OUTPT VISIT, NEW, LEVL IV, 45-59 MIN: ICD-10-PCS | Mod: S$GLB,,, | Performed by: INTERNAL MEDICINE

## 2019-11-20 PROCEDURE — 1159F PR MEDICATION LIST DOCUMENTED IN MEDICAL RECORD: ICD-10-PCS | Mod: S$GLB,,, | Performed by: INTERNAL MEDICINE

## 2019-11-20 PROCEDURE — 99999 PR PBB SHADOW E&M-EST. PATIENT-LVL IV: CPT | Mod: PBBFAC,,, | Performed by: INTERNAL MEDICINE

## 2019-11-20 PROCEDURE — 99999 PR PBB SHADOW E&M-EST. PATIENT-LVL IV: ICD-10-PCS | Mod: PBBFAC,,, | Performed by: INTERNAL MEDICINE

## 2019-11-20 PROCEDURE — 1101F PR PT FALLS ASSESS DOC 0-1 FALLS W/OUT INJ PAST YR: ICD-10-PCS | Mod: CPTII,S$GLB,, | Performed by: INTERNAL MEDICINE

## 2019-11-20 PROCEDURE — 1159F MED LIST DOCD IN RCRD: CPT | Mod: S$GLB,,, | Performed by: INTERNAL MEDICINE

## 2019-11-20 PROCEDURE — 99204 OFFICE O/P NEW MOD 45 MIN: CPT | Mod: S$GLB,,, | Performed by: INTERNAL MEDICINE

## 2019-11-20 PROCEDURE — 1101F PT FALLS ASSESS-DOCD LE1/YR: CPT | Mod: CPTII,S$GLB,, | Performed by: INTERNAL MEDICINE

## 2019-11-20 PROCEDURE — 1126F PR PAIN SEVERITY QUANTIFIED, NO PAIN PRESENT: ICD-10-PCS | Mod: S$GLB,,, | Performed by: INTERNAL MEDICINE

## 2019-11-20 PROCEDURE — 1126F AMNT PAIN NOTED NONE PRSNT: CPT | Mod: S$GLB,,, | Performed by: INTERNAL MEDICINE

## 2019-11-20 NOTE — PATIENT INSTRUCTIONS
Understanding Atrial Fibrillation    An arrhythmia is any problem with the speed or pattern of the heartbeat. Atrial fibrillation (AFib) is a common type of arrhythmia. It causes fast, chaotic electrical signals in the atria. This leads to poor functioning of the heart. It also affects how much blood your heart can pump out to the body.  Afib may occur once in a while and go away on its own. Or it may continue for longer periods and need treatment.  AFib can lead to serious problems, such as stroke. Your healthcare provider will need to monitor and manage it.  What happens during atrial fibrillation?   The heart has an electrical system that sends signals to control the heartbeat. As signals move through the heart, they tell the hearts upper chambers (atria) and lower chambers (ventricles) when to squeeze (contract) and relax. This lets blood move through the heart and out to the body and lungs.  With AFib, the atria receive abnormal signals. This causes them to contract in a fast and irregular way, and out of sync with the ventricles. When this happens, the atria also have a harder time moving blood into the ventricles. Blood may then pool in the atria, which increases the risk for blood clots and stroke. The ventricles also may contract too quickly and irregularly. As a result, they may not pump blood to the body and lungs as well as they should. This can weaken the heart muscle over time and cause heart failure.  What causes atrial fibrillation?  AFib is more common in older adults. It has many possible causes including:  · Coronary artery disease  · Heart valve disease  · Heart attack  · Heart surgery  · High blood pressure  · Thyroid disease  · Diabetes  · Lung disease  · Sleep apnea  · Heavy alcohol use  In some cases of AFib, doctors do not know the cause.  What are the symptoms of atrial fibrillation?  AFib may or may not cause symptoms. If symptoms do occur, they may include:  · A fast, pounding,  irregular heartbeat  · Shortness of breath  · Tiredness  · Dizziness or fainting  · Chest pain  How is atrial fibrillation treated?  Treatments for AFib can include any of the options below.  · Medicines. You may be prescribed:  ¨ Heart rate medicines to help slow down the heartbeat  ¨ Heart rhythm medicines to help the heart beat more regularly  ¨ Anti-clotting medicines to help reduce the risk for blood clots and stroke  · Electrical cardioversion. Your healthcare provider uses special pads or paddles to send one or more brief electrical shocks to the heart. This can help reset the heartbeat to normal.  · Ablation. Long, thin tubes called catheters are threaded through a blood vessel to the heart. There, the catheters send out hot or cold energy to the areas causing the abnormal signals. This energy destroys the problem tissue or cells. This improves the chances that your heart will stay in normal rhythm without using medicines. If your heart rate and rhythm cant be controlled, you may need ablation and a pacemaker. These will help control the heart rate and regularity of the heartbeat.  · Surgery. During surgery, your healthcare provider may use different methods to create scar tissue in the areas of the heart causing the abnormal signals. The scar tissue disrupts the abnormal signals and may stop AFib from occurring.  What are the complications of atrial fibrillation?  These can include:  · Blood clots  · Stroke  · Heart failure. This problem occurs when the heart muscle weakens so much that it can no longer pump blood well.  When should I call my healthcare provider?  Call your healthcare provider right away if you have any of these:  · Symptoms that dont get better with treatment, or get worse  · New symptoms   Date Last Reviewed: 5/1/2016  © 6185-5271 [employer]. [address] [place] [address], [place] [address]. All rights reserved. This information is not intended as a substitute for professional  medical care. Always follow your healthcare professional's instructions.

## 2019-11-20 NOTE — LETTER
November 20, 2019      Caden Madrid MD  [address]  [facility] [address]           [facility] Cardiology  [address]  [facility] [address]  Phone: [phone]          Patient: Bhavna Landry   MR Number: [id number]   YOB: 1955   Date of Visit: 11/20/2019       Dear Dr. Caden Madrid:    Thank you for referring Bhavna Landry to me for evaluation. Attached you will find relevant portions of my assessment and plan of care.    If you have questions, please do not hesitate to call me. I look forward to following Bhavna Landry along with you.    Sincerely,    Philipp Goel MD    Enclosure  CC:  No Recipients    If you would like to receive this communication electronically, please contact [email or web address] or [phone] to request more information on [employer] access.    For providers and/or their staff who would like to refer a patient to [facility], please contact us through our one-stop-shop provider referral line, [facility] , at [phone].    If you feel you have received this communication in error or would no longer like to receive these types of communications, please e-mail [email or web address]

## 2019-11-20 NOTE — PROGRESS NOTES
Subjective:   @Patient ID:  Bhavna Landry is a 64 y.o. female who presents for evaluation of atrial fibrillation         HPI:   Patient her for initial evaluation.   She went to the ED 11/15/2019 with Joanna with [facility]  And she was spontaneously converted after cardizem.  For the last 2 weeks she has been having upper respiratory tract infection and got steroids. The day she presented she felt warmth all over her chest.     Today she is feeling well.  Back in SR. No chest pain or palpitations. She is compliant with her medication.     NO DM, no TIA or CVA. Remote hx of tobacco, No known ROMAN.   Has sig FH of heart disease, and strokes.     Pertinent Hx( FER, HTN, HLP, remote Tobacco abuse)      Prior cardiovascular  Hx  --------------------------------           - ECHO 6/2014  EF 60-65%    - Carotid U/S  20-39% stenosis b/l carotids    - Stress Echo 9/2016 Pharmacologic negative for ischemia       Patient Active Problem List    Diagnosis Date Noted    PAF (paroxysmal atrial fibrillation) 11/20/2019    Bilateral carotid artery disease 11/20/2019    Bilateral sacroiliitis 10/22/2019    Venous incompetence 09/30/2019    Senile nuclear sclerosis 08/01/2019    Osteoporosis, post-menopausal 02/21/2019    Medication monitoring encounter 02/14/2019    Restless legs 12/14/2018    Tobacco use disorder 12/11/2018    Myofascial pain syndrome 09/07/2018    Chronic pain 08/30/2018    Decreased ROM of lumbar spine 08/17/2018    Decreased strength 08/17/2018    Acute right-sided low back pain without sciatica 08/17/2018    Sacroiliac joint dysfunction 08/13/2018    Recurrent major depressive disorder, in partial remission 08/02/2018    History of extrapyramidal symptoms 08/02/2018    Brow ptosis 07/31/2018    Right-sided chest wall pain 06/16/2018    Scalp laceration 07/17/2017    Right knee pain 11/30/2015    Pain of right tibia 11/30/2015    Osteopenia 06/02/2015    Knee pain, right 05/25/2015    Tibial  fracture 2014    S/P R knee surgery, TKA revision 2014    Right knee DJD 2014    Internal derangement of right knee 2014    History of nickel allergy 2014    Exposure to nickel dust 2014    Fibromyalgia 2014    Syncope 2014    Retinal tear 10/08/2013    Posterior vitreous detachment, both eyes 2013    Retinal hemorrhage, left 2013    Nuclear sclerosis - Both Eyes 2013    Duane's syndrome of right eye 2013    Total knee replacement status 2013    Migraine without aura and without status migrainosus, not intractable 2013    Schizophrenia 2013    Insomnia 2013    Extrapyramidal syndrome 2013    Occlusion and stenosis of carotid artery without mention of cerebral infarction 2012    Essential hypertension 2012    Hyperlipidemia 2012    PSA (psoriatic arthritis) 2012    Osteoarthritis 2012    Low back pain 2012           Right Arm BP - Sittin/75  Left Arm BP - Sittin/77        LAST HbA1c  Lab Results   Component Value Date    HGBA1C 5.3 2018       Lipid panel  Lab Results   Component Value Date    CHOL 173 10/28/2019    CHOL 151 2019    CHOL 182 2018     Lab Results   Component Value Date     (H) 10/28/2019    HDL 90 (H) 2019    HDL 84 (H) 2018     Lab Results   Component Value Date    LDLCALC 58.6 (L) 10/28/2019    LDLCALC 50.6 (L) 2019    LDLCALC 85.0 2018     Lab Results   Component Value Date    TRIG 52 10/28/2019    TRIG 52 2019    TRIG 65 2018     Lab Results   Component Value Date    CHOLHDL 60.1 (H) 10/28/2019    CHOLHDL 59.6 (H) 2019    CHOLHDL 46.2 2018            Review of Systems   Constitution: Negative for chills and fever.   HENT: Negative for hearing loss and nosebleeds.    Eyes: Negative for blurred vision.   Cardiovascular: Negative for chest pain and  palpitations.   Respiratory: Negative for hemoptysis and shortness of breath.    Hematologic/Lymphatic: Negative for bleeding problem.   Skin: Negative for itching.   Musculoskeletal: Negative for falls.   Gastrointestinal: Negative for abdominal pain and hematochezia.   Genitourinary: Negative for hematuria.   Neurological: Negative for dizziness and loss of balance.   Psychiatric/Behavioral: Negative for altered mental status and depression.       Objective:   Physical Exam   Constitutional: She is oriented to person, place, and time. She appears well-developed and well-nourished.   HENT:   Head: Normocephalic and atraumatic.   Eyes: Conjunctivae are normal.   Neck: Neck supple. Carotid bruit is present (b/l  Rt>Lt).   Cardiovascular: Normal rate, regular rhythm and normal heart sounds. Exam reveals no gallop and no friction rub.   No murmur heard.  Pulmonary/Chest: Effort normal and breath sounds normal. No stridor. No respiratory distress. She has no wheezes.   Neurological: She is alert and oriented to person, place, and time.   Skin: Skin is warm and dry.   Psychiatric: She has a normal mood and affect. Her behavior is normal.       Assessment:     1. PAF (paroxysmal atrial fibrillation)    2. Essential hypertension    3. Hyperlipidemia, unspecified hyperlipidemia type    4. Bilateral carotid artery stenosis        Plan:     - Current in SR  - Most likely triggered but resp infection. However silent PAF can't be excluded hence will check EM.     - I have discussed with the patient about stroke risk, patient would like to start anticoagulation, she understands the risks and understands that the a.fib could just be related to the resp infection event.  Will start NOAC and cut ASA back to 81 mg.  CHADVASC is 4    - Continue ASA/Statin  - Monitor BP at home and let us know if it is consistently >130/80    - Check Echo, she reports hx of mitral valve prolapse       Pertinent cardiac images and EKG reviewed  independently.    Continue with current medical plan and lifestyle changes.  Return sooner for concerns or questions. If symptoms persist go to the ED  I have reviewed all pertinent data including patient's medical history in detail and updated the computerized patient record.     Orders Placed This Encounter   Procedures    Cardiac event monitor     Standing Status:   Future     Standing Expiration Date:   11/20/2020     Order Specific Question:   Cardiac Event Monitor     Answer:   Auto Trigger    Echo Color Flow Doppler? Yes     Standing Status:   Future     Standing Expiration Date:   11/20/2020     Order Specific Question:   Color Flow Doppler?     Answer:   Yes       Follow up as scheduled.     She expressed verbal understanding and agreed with the plan    Patient's Medications   New Prescriptions    APIXABAN (ELIQUIS) 5 MG TAB    Take 1 tablet (5 mg total) by mouth 2 (two) times daily.   Previous Medications    ALBUTEROL (ACCUNEB) 1.25 MG/3 ML NEBU    Take 3 mLs (1.25 mg total) by nebulization every 6 (six) hours as needed. Rescue    ALBUTEROL (PROVENTIL/VENTOLIN HFA) 90 MCG/ACTUATION INHALER    Inhale 2 puffs into the lungs every 6 (six) hours as needed for Wheezing.    AMLODIPINE (NORVASC) 5 MG TABLET    TAKE 1 TABLET BY MOUTH EVERY DAY    ATORVASTATIN (LIPITOR) 40 MG TABLET    TAKE 1 TABLET(40 MG) BY MOUTH EVERY DAY    BACLOFEN (LIORESAL) 20 MG TABLET    Take 1 tablet (20 mg total) by mouth 3 (three) times daily.    BENZONATATE (TESSALON PERLES) 100 MG CAPSULE    Take 1 capsule (100 mg total) by mouth every 6 (six) hours as needed for Cough.    DICLOFENAC SODIUM (VOLTAREN) 1 % GEL    Apply 2 g topically once daily.    GABAPENTIN (NEURONTIN) 300 MG CAPSULE    Take 1 capsule (300 mg total) by mouth every evening.    HYDROCHLOROTHIAZIDE (HYDRODIURIL) 25 MG TABLET    TAKE 1 TABLET(25 MG) BY MOUTH EVERY DAY    LISINOPRIL 10 MG TABLET    Take 10 mg by mouth daily as needed.     METOPROLOL SUCCINATE (TOPROL-XL)  25 MG 24 HR TABLET    Take 0.5 tablets (12.5 mg total) by mouth once daily.    PREDNISONE (DELTASONE) 20 MG TABLET    2 tabs po q day for 5 days    PROMETHAZINE-DEXTROMETHORPHAN (PROMETHAZINE-DM) 6.25-15 MG/5 ML SYRP    Take 5 mLs by mouth 3 (three) times daily as needed (cough).    RISPERIDONE (RISPERDAL) 2 MG TABLET    Take 1 tablet (2 mg total) by mouth every evening.    RISPERIDONE (RISPERDAL) 4 MG TABLET    Take 1 tablet (4 mg total) by mouth every morning.    SECUKINUMAB (COSENTYX PEN) 150 MG/ML PNIJ    Inject 150 mg into the skin every 4 weeks    SULFASALAZINE (AZULFIDINE) 500 MG TBEC    TAKE 3 TABLETS(1500 MG) BY MOUTH TWICE DAILY    TRAMADOL (ULTRAM) 50 MG TABLET    TAKE 1 TABLET BY MOUTH EVERY 12 HOURS AS NEEDED FOR PAIN    VENLAFAXINE (EFFEXOR-XR) 150 MG CP24    Take 1 capsule (150 mg total) by mouth once daily.   Modified Medications    No medications on file   Discontinued Medications    ASPIRIN 325 MG TABLET    Take 325 mg by mouth once daily.    KETOROLAC 0.4% (ACULAR) 0.4 % DROP    Place 1 drop into the right eye 3 (three) times daily.    OFLOXACIN (OCUFLOX) 0.3 % OPHTHALMIC SOLUTION    Place 1 drop into the right eye 3 (three) times daily.    PREDNISOLONE ACETATE (PRED FORTE) 1 % DRPS    Place 1 drop into the right eye 3 (three) times daily.

## 2019-11-21 ENCOUNTER — TELEPHONE (OUTPATIENT)
Dept: CARDIOLOGY | Facility: CLINIC | Age: 64
End: 2019-11-21

## 2019-11-21 ENCOUNTER — TELEPHONE (OUTPATIENT)
Dept: INTERNAL MEDICINE | Facility: CLINIC | Age: 64
End: 2019-11-21

## 2019-11-21 RX ORDER — AMOXICILLIN AND CLAVULANATE POTASSIUM 875; 125 MG/1; MG/1
1 TABLET, FILM COATED ORAL 2 TIMES DAILY
Qty: 20 TABLET | Refills: 0 | Status: ON HOLD | OUTPATIENT
Start: 2019-11-21 | End: 2020-06-02 | Stop reason: CLARIF

## 2019-11-21 NOTE — TELEPHONE ENCOUNTER
----- Message from Patty Khanna sent at 11/21/2019 11:07 AM CST -----  Contact: Patient [phone]  Patient is calling for an RX refill or new RX.  Is this a refill or new RX:  new  RX name and strength:   Directions (copy/paste from chart):    Is this a 30 day or 90 day RX:    Local pharmacy or mail order pharmacy:  local  Pharmacy name and phone # (copy/paste from chart):   [id number] - [place] - [address] AT [facility] [place] [address] [phone] (Phone)  [phone] (Fax)    Comments:  Coughing/coughing up yellow and green mucus. Patient will be there Monday. But need something sent to the Pharmacy today.    Please call and advise.    Thank You

## 2019-11-21 NOTE — TELEPHONE ENCOUNTER
----- Message from Salvador Dash sent at 11/21/2019 11:20 AM CST -----  Contact: [phone] / self  Patient would like to reschedule her appointment for her monitor to be put on. Please Advise.

## 2019-11-21 NOTE — TELEPHONE ENCOUNTER
Pt says she has been sick for 3 weeks. She says when prednisone was prescribed she had some improvement but her symptoms returned. Pt says she has Congestion, she is coughing up yellow and green mucus. Pt says she is unsure if she has fever or not but she does have chills. Please advise

## 2019-11-25 ENCOUNTER — OFFICE VISIT (OUTPATIENT)
Dept: INTERNAL MEDICINE | Facility: CLINIC | Age: 64
End: 2019-11-25
Payer: MEDICARE

## 2019-11-25 VITALS
DIASTOLIC BLOOD PRESSURE: 82 MMHG | WEIGHT: 101.88 LBS | BODY MASS INDEX: 18.75 KG/M2 | OXYGEN SATURATION: 99 % | SYSTOLIC BLOOD PRESSURE: 138 MMHG | HEART RATE: 82 BPM | HEIGHT: 62 IN

## 2019-11-25 DIAGNOSIS — I10 ESSENTIAL HYPERTENSION: Chronic | ICD-10-CM

## 2019-11-25 DIAGNOSIS — I65.23 BILATERAL CAROTID ARTERY STENOSIS: ICD-10-CM

## 2019-11-25 DIAGNOSIS — I77.9 BILATERAL CAROTID ARTERY DISEASE, UNSPECIFIED TYPE: ICD-10-CM

## 2019-11-25 DIAGNOSIS — F32.5 MAJOR DEPRESSION IN REMISSION: ICD-10-CM

## 2019-11-25 DIAGNOSIS — I48.0 PAF (PAROXYSMAL ATRIAL FIBRILLATION): ICD-10-CM

## 2019-11-25 DIAGNOSIS — J41.1 MUCOPURULENT CHRONIC BRONCHITIS: ICD-10-CM

## 2019-11-25 DIAGNOSIS — J43.9 PULMONARY EMPHYSEMA, UNSPECIFIED EMPHYSEMA TYPE: ICD-10-CM

## 2019-11-25 DIAGNOSIS — E78.5 HYPERLIPIDEMIA, UNSPECIFIED HYPERLIPIDEMIA TYPE: ICD-10-CM

## 2019-11-25 DIAGNOSIS — M81.0 OSTEOPOROSIS, POST-MENOPAUSAL: ICD-10-CM

## 2019-11-25 DIAGNOSIS — J42 CHRONIC BRONCHITIS, UNSPECIFIED CHRONIC BRONCHITIS TYPE: ICD-10-CM

## 2019-11-25 DIAGNOSIS — Z87.891 SMOKING HISTORY: Primary | ICD-10-CM

## 2019-11-25 DIAGNOSIS — G89.4 CHRONIC PAIN SYNDROME: ICD-10-CM

## 2019-11-25 PROBLEM — S01.01XA SCALP LACERATION: Status: RESOLVED | Noted: 2017-07-17 | Resolved: 2019-11-25

## 2019-11-25 PROBLEM — R07.89 RIGHT-SIDED CHEST WALL PAIN: Status: RESOLVED | Noted: 2018-06-16 | Resolved: 2019-11-25

## 2019-11-25 PROBLEM — F17.200 TOBACCO USE DISORDER: Chronic | Status: RESOLVED | Noted: 2018-12-11 | Resolved: 2019-11-25

## 2019-11-25 PROBLEM — Z51.81 MEDICATION MONITORING ENCOUNTER: Status: RESOLVED | Noted: 2019-02-14 | Resolved: 2019-11-25

## 2019-11-25 PROBLEM — Z87.898 HISTORY OF EXTRAPYRAMIDAL SYMPTOMS: Status: RESOLVED | Noted: 2018-08-02 | Resolved: 2019-11-25

## 2019-11-25 PROCEDURE — 99214 OFFICE O/P EST MOD 30 MIN: CPT | Mod: S$GLB,,, | Performed by: INTERNAL MEDICINE

## 2019-11-25 PROCEDURE — 3075F SYST BP GE 130 - 139MM HG: CPT | Mod: CPTII,S$GLB,, | Performed by: INTERNAL MEDICINE

## 2019-11-25 PROCEDURE — 3079F PR MOST RECENT DIASTOLIC BLOOD PRESSURE 80-89 MM HG: ICD-10-PCS | Mod: CPTII,S$GLB,, | Performed by: INTERNAL MEDICINE

## 2019-11-25 PROCEDURE — 99999 PR PBB SHADOW E&M-EST. PATIENT-LVL V: ICD-10-PCS | Mod: PBBFAC,,, | Performed by: INTERNAL MEDICINE

## 2019-11-25 PROCEDURE — 3075F PR MOST RECENT SYSTOLIC BLOOD PRESS GE 130-139MM HG: ICD-10-PCS | Mod: CPTII,S$GLB,, | Performed by: INTERNAL MEDICINE

## 2019-11-25 PROCEDURE — 99499 RISK ADDL DX/OHS AUDIT: ICD-10-PCS | Mod: S$GLB,,, | Performed by: INTERNAL MEDICINE

## 2019-11-25 PROCEDURE — 99999 PR PBB SHADOW E&M-EST. PATIENT-LVL V: CPT | Mod: PBBFAC,,, | Performed by: INTERNAL MEDICINE

## 2019-11-25 PROCEDURE — 99214 PR OFFICE/OUTPT VISIT, EST, LEVL IV, 30-39 MIN: ICD-10-PCS | Mod: S$GLB,,, | Performed by: INTERNAL MEDICINE

## 2019-11-25 PROCEDURE — 3079F DIAST BP 80-89 MM HG: CPT | Mod: CPTII,S$GLB,, | Performed by: INTERNAL MEDICINE

## 2019-11-25 PROCEDURE — 99499 UNLISTED E&M SERVICE: CPT | Mod: S$GLB,,, | Performed by: INTERNAL MEDICINE

## 2019-11-25 RX ORDER — ALBUTEROL SULFATE 90 UG/1
AEROSOL, METERED RESPIRATORY (INHALATION)
Qty: 3 EACH | Refills: 3 | Status: SHIPPED | OUTPATIENT
Start: 2019-11-25 | End: 2020-01-14 | Stop reason: SDUPTHER

## 2019-11-25 NOTE — PROGRESS NOTES
Subjective:       Patient ID: Bhavna Landry is a 64 y.o. female.    Chief Complaint: Annual Exam    HPI   She has prolonged bronchitis - she was evaluated twice in UC and once in ER with new A Fib.  She took doxy and prednisone, and albuterol, but symptoms didn't improve.  Nebulized treatments were prescribed, and that has helped.  Car on 11/11 and 11/15:  No infiltrate.  Hyperinflation noted.   She smoked for decades.  She is unaware of wheezing, tightness, SOB.   Prior smoker. I worley din Augmentin last week and she feels that finally cough has improved.  She has a lot of head congestion, sore throat.  C/o frontal headache.    Back pain improved after last procedure:  radiofreq ablation.  She has cut down of tramadol to one a day. Also takes if for hand stiffness.    Depression is controlled.  Dr Poe is managing.      She has osteoporosis - REFUSED RECLAST    Review of Systems   Constitutional: Negative for fever and unexpected weight change.   HENT: Negative for congestion and postnasal drip.    Eyes: Negative for pain, discharge and visual disturbance.   Respiratory: Positive for cough. Negative for chest tightness, shortness of breath and wheezing.    Cardiovascular: Negative for chest pain and leg swelling.   Gastrointestinal: Negative for abdominal pain, constipation, diarrhea and nausea.   Genitourinary: Negative for difficulty urinating, dysuria and hematuria.   Skin: Negative for rash.   Neurological: Negative for headaches.   Psychiatric/Behavioral: Negative for dysphoric mood and sleep disturbance. The patient is not nervous/anxious.        Objective:      Physical Exam   Constitutional: She is oriented to person, place, and time. She appears well-developed and well-nourished. No distress.   HENT:   Head: Normocephalic and atraumatic.   Right Ear: External ear normal.   Left Ear: External ear normal.   Nose: Nose normal.   Mouth/Throat: Oropharynx is clear and moist.   Eyes: Pupils are equal, round,  and reactive to light. Conjunctivae and EOM are normal. Right eye exhibits no discharge. Left eye exhibits no discharge. No scleral icterus.   Neck: Normal range of motion. Neck supple. No JVD present. No thyromegaly present.   Cardiovascular: Normal rate, regular rhythm and normal heart sounds. Exam reveals no gallop.   No murmur heard.  Pulmonary/Chest: Effort normal and breath sounds normal. No respiratory distress. She has no wheezes. She has no rales. No breast tenderness, discharge or bleeding.   Upper airway congestion and bronchoaspasm demonstrated by coughing - no wheezing.   Abdominal: Soft. Bowel sounds are normal. She exhibits no distension and no mass. There is no tenderness. There is no rebound and no guarding.   Genitourinary: No breast tenderness, discharge or bleeding.   Musculoskeletal: Normal range of motion. She exhibits no edema or tenderness.   Lymphadenopathy:     She has no cervical adenopathy.   Neurological: She is alert and oriented to person, place, and time. No cranial nerve deficit. Coordination normal.   Skin: Skin is warm and dry. No rash noted.   Psychiatric: She has a normal mood and affect. Her behavior is normal. Judgment and thought content normal.       Results for orders placed or performed during the hospital encounter of 11/15/19   CBC auto differential   Result Value Ref Range    WBC 9.39 3.90 - 12.70 K/uL    RBC 3.83 (L) 4.00 - 5.40 M/uL    Hemoglobin 12.2 12.0 - 16.0 g/dL    Hematocrit 37.1 37.0 - 48.5 %    Mean Corpuscular Volume 97 82 - 98 fL    Mean Corpuscular Hemoglobin 31.9 (H) 27.0 - 31.0 pg    Mean Corpuscular Hemoglobin Conc 32.9 32.0 - 36.0 g/dL    RDW 13.4 11.5 - 14.5 %    Platelets 357 (H) 150 - 350 K/uL    MPV 8.2 (L) 9.2 - 12.9 fL    Gran # (ANC) 7.7 1.8 - 7.7 K/uL    Lymph # 1.3 1.0 - 4.8 K/uL    Mono # 0.4 0.3 - 1.0 K/uL    Eos # 0.0 0.0 - 0.5 K/uL    Baso # 0.01 0.00 - 0.20 K/uL    Gran% 82.3 (H) 38.0 - 73.0 %    Lymph% 13.8 (L) 18.0 - 48.0 %    Mono% 3.7  (L) 4.0 - 15.0 %    Eosinophil% 0.1 0.0 - 8.0 %    Basophil% 0.1 0.0 - 1.9 %    Differential Method Automated    Comprehensive metabolic panel   Result Value Ref Range    Sodium 132 (L) 136 - 145 mmol/L    Potassium 4.9 3.5 - 5.1 mmol/L    Chloride 101 95 - 110 mmol/L    CO2 21 (L) 23 - 29 mmol/L    Glucose 135 (H) 70 - 110 mg/dL    BUN, Bld 5 (L) 8 - 23 mg/dL    Creatinine 0.9 0.5 - 1.4 mg/dL    Calcium 9.8 8.7 - 10.5 mg/dL    Total Protein 5.7 (L) 6.0 - 8.4 g/dL    Albumin 3.6 3.5 - 5.2 g/dL    Total Bilirubin 0.5 0.1 - 1.0 mg/dL    Alkaline Phosphatase 71 55 - 135 U/L    AST 30 10 - 40 U/L    ALT 26 10 - 44 U/L    Anion Gap 10 8 - 16 mmol/L    eGFR if African American >60 >60 mL/min/1.73 m^2    eGFR if non African American >60 >60 mL/min/1.73 m^2   Troponin I   Result Value Ref Range    Troponin I 0.017 0.000 - 0.026 ng/mL   Brain natriuretic peptide   Result Value Ref Range    BNP 43 0 - 99 pg/mL   TSH   Result Value Ref Range    TSH 2.001 0.400 - 4.000 uIU/mL     *Note: Due to a large number of results and/or encounters for the requested time period, some results have not been displayed. A complete set of results can be found in Results Review.     Last lipids at goal    Assessment:       1. Smoking history    2. Chronic bronchitis, unspecified chronic bronchitis type    3. Mucopurulent chronic bronchitis     4. Pulmonary emphysema, unspecified emphysema type    5. Bilateral carotid artery stenosis    6. Chronic pain syndrome    7. Hyperlipidemia, unspecified hyperlipidemia type    8. PAF (paroxysmal atrial fibrillation)    9. Essential hypertension    10. Bilateral carotid artery disease, unspecified type    11. Major depression in remission    12. Osteoporosis, post-menopausal        Plan:       Bhavna was seen today for annual exam.    Diagnoses and all orders for this visit:    Smoking history  -     Spirometry with/without bronchodilator; Future    Chronic bronchitis, unspecified chronic bronchitis type  -      Spirometry with/without bronchodilator; Future    Mucopurulent chronic bronchitis   -     Spirometry with/without bronchodilator; Future    Pulmonary emphysema, unspecified emphysema type    Bilateral carotid artery stenosis  -     US Carotid Bilateral; Future    Chronic pain syndrome    Hyperlipidemia, unspecified hyperlipidemia type    PAF (paroxysmal atrial fibrillation)    Essential hypertension    Bilateral carotid artery disease, unspecified type    Other orders  -     Cancel: [facility] (Opioids) ALREADY SEEING PSYCH.  -     albuterol (PROAIR HFA) 90 mcg/actuation inhaler; 2 puffs qid prn       She defers MG to next year.  Pneumo 23

## 2019-11-26 ENCOUNTER — TELEPHONE (OUTPATIENT)
Dept: INTERNAL MEDICINE | Facility: CLINIC | Age: 64
End: 2019-11-26

## 2019-11-26 ENCOUNTER — CLINICAL SUPPORT (OUTPATIENT)
Dept: INTERNAL MEDICINE | Facility: CLINIC | Age: 64
End: 2019-11-26
Payer: MEDICARE

## 2019-11-26 DIAGNOSIS — G25.81 RESTLESS LEGS: ICD-10-CM

## 2019-11-26 PROCEDURE — 99999 PR PBB SHADOW E&M-EST. PATIENT-LVL I: ICD-10-PCS | Mod: PBBFAC,,,

## 2019-11-26 PROCEDURE — 90732 PPSV23 VACC 2 YRS+ SUBQ/IM: CPT | Mod: S$GLB,,, | Performed by: INTERNAL MEDICINE

## 2019-11-26 PROCEDURE — G0009 PNEUMOCOCCAL POLYSACCHARIDE VACCINE 23-VALENT =>2YO SQ IM: ICD-10-PCS | Mod: S$GLB,,, | Performed by: INTERNAL MEDICINE

## 2019-11-26 PROCEDURE — G0009 ADMIN PNEUMOCOCCAL VACCINE: HCPCS | Mod: S$GLB,,, | Performed by: INTERNAL MEDICINE

## 2019-11-26 PROCEDURE — 99999 PR PBB SHADOW E&M-EST. PATIENT-LVL I: CPT | Mod: PBBFAC,,,

## 2019-11-26 PROCEDURE — 90732 PNEUMOCOCCAL POLYSACCHARIDE VACCINE 23-VALENT =>2YO SQ IM: ICD-10-PCS | Mod: S$GLB,,, | Performed by: INTERNAL MEDICINE

## 2019-11-26 RX ORDER — ALBUTEROL SULFATE 1.25 MG/3ML
SOLUTION RESPIRATORY (INHALATION)
Qty: 75 ML | Refills: 0 | Status: ON HOLD | OUTPATIENT
Start: 2019-11-26 | End: 2021-03-31

## 2019-11-26 RX ORDER — ALBUTEROL SULFATE 90 UG/1
2 AEROSOL, METERED RESPIRATORY (INHALATION) EVERY 6 HOURS PRN
Qty: 1 EACH | Refills: 11 | Status: CANCELLED | OUTPATIENT
Start: 2019-11-26

## 2019-11-26 RX ORDER — TRAMADOL HYDROCHLORIDE 50 MG/1
TABLET ORAL
Qty: 60 TABLET | Refills: 0 | Status: SHIPPED | OUTPATIENT
Start: 2019-11-26 | End: 2020-01-14 | Stop reason: SDUPTHER

## 2019-11-26 NOTE — TELEPHONE ENCOUNTER
----- Message from Viktor Drake sent at 11/26/2019  8:09 AM CST -----  Contact: Pt [phone]  Is this a refill or new RX:  Refill    RX name and strength: albuterol (PROVENTIL/VENTOLIN HFA) 90 mcg/actuation inhaler and traMADol (ULTRAM) 50 mg tablet    Pharmacy name and phone # [id number] - [place]  [address] AT [facility] [address] [phone] (Phone)  [phone] (Fax)

## 2019-11-26 NOTE — TELEPHONE ENCOUNTER
Refill Routing Note    Medication(s) are appropriate for refill Outside of protocol      Requested Prescriptions   Pending Prescriptions Disp Refills    gabapentin (NEURONTIN) 300 MG capsule 60 capsule 5     Sig: Take 1 capsule (300 mg total) by mouth every evening.       Anticonvulsants Protocol Passed - 11/26/2019  3:38 PM        Passed - Visit with Authorizing provider in past 9 months or upcoming 90 days        Passed - No active pregnancy on record

## 2019-11-29 ENCOUNTER — HOSPITAL ENCOUNTER (OUTPATIENT)
Dept: CARDIOLOGY | Facility: HOSPITAL | Age: 64
Discharge: HOME | End: 2019-11-29
Attending: INTERNAL MEDICINE
Payer: MEDICARE

## 2019-11-29 VITALS — WEIGHT: 101 LBS | HEIGHT: 62 IN | BODY MASS INDEX: 18.58 KG/M2

## 2019-11-29 DIAGNOSIS — I48.0 PAF (PAROXYSMAL ATRIAL FIBRILLATION): ICD-10-CM

## 2019-11-29 LAB
AORTIC ROOT ANNULUS: 2.46 CM
ASCENDING AORTA: 2.81 CM
AV INDEX (PROSTH): 0.61
AV MEAN GRADIENT: 7 MMHG
AV PEAK GRADIENT: 12 MMHG
AV VALVE AREA: 1.86 CM2
AV VELOCITY RATIO: 0.74
BSA FOR ECHO PROCEDURE: 1.42 M2
CV ECHO LV RWT: 0.47 CM
DOP CALC AO PEAK VEL: 1.75 M/S
DOP CALC AO VTI: 32.28 CM
DOP CALC LVOT AREA: 3 CM2
DOP CALC LVOT DIAMETER: 1.97 CM
DOP CALC LVOT PEAK VEL: 1.3 M/S
DOP CALC LVOT STROKE VOLUME: 60.05 CM3
DOP CALCLVOT PEAK VEL VTI: 19.71 CM
E WAVE DECELERATION TIME: 191.45 MSEC
E/A RATIO: 0.7
ECHO LV POSTERIOR WALL: 0.99 CM (ref 0.6–1.1)
FRACTIONAL SHORTENING: 36 % (ref 28–44)
INTERVENTRICULAR SEPTUM: 1.01 CM (ref 0.6–1.1)
IVRT: 0.1 MSEC
LA MAJOR: 4.1 CM
LA MINOR: 3.92 CM
LA WIDTH: 3.12 CM
LEFT ATRIUM SIZE: 3.03 CM
LEFT ATRIUM VOLUME INDEX: 22.5 ML/M2
LEFT ATRIUM VOLUME: 32.21 CM3
LEFT INTERNAL DIMENSION IN SYSTOLE: 2.67 CM (ref 2.1–4)
LEFT VENTRICLE DIASTOLIC VOLUME INDEX: 54.67 ML/M2
LEFT VENTRICLE DIASTOLIC VOLUME: 78.18 ML
LEFT VENTRICLE MASS INDEX: 96 G/M2
LEFT VENTRICLE SYSTOLIC VOLUME INDEX: 18.5 ML/M2
LEFT VENTRICLE SYSTOLIC VOLUME: 26.4 ML
LEFT VENTRICULAR INTERNAL DIMENSION IN DIASTOLE: 4.19 CM (ref 3.5–6)
LEFT VENTRICULAR MASS: 136.73 G
MV PEAK A VEL: 0.81 M/S
MV PEAK E VEL: 0.57 M/S
PISA TR MAX VEL: 1.44 M/S
PULM VEIN S/D RATIO: 1.24
PV PEAK D VEL: 0.42 M/S
PV PEAK S VEL: 0.52 M/S
RA MAJOR: 3.72 CM
RA PRESSURE: 3 MMHG
RA WIDTH: 2.47 CM
RIGHT VENTRICULAR END-DIASTOLIC DIMENSION: 2.36 CM
STJ: 2.24 CM
TR MAX PG: 8 MMHG
TV REST PULMONARY ARTERY PRESSURE: 11 MMHG

## 2019-11-29 PROCEDURE — 93306 ECHO (CUPID ONLY): ICD-10-PCS | Mod: 26,,, | Performed by: INTERNAL MEDICINE

## 2019-11-29 PROCEDURE — 93306 TTE W/DOPPLER COMPLETE: CPT | Mod: 26,,, | Performed by: INTERNAL MEDICINE

## 2019-11-29 PROCEDURE — 93306 TTE W/DOPPLER COMPLETE: CPT

## 2019-11-29 RX ORDER — GABAPENTIN 300 MG/1
300 CAPSULE ORAL NIGHTLY
Qty: 60 CAPSULE | Refills: 5
Start: 2019-11-29 | End: 2019-12-03 | Stop reason: SDUPTHER

## 2019-12-02 ENCOUNTER — TELEPHONE (OUTPATIENT)
Dept: CARDIOLOGY | Facility: CLINIC | Age: 64
End: 2019-12-02

## 2019-12-02 ENCOUNTER — PATIENT MESSAGE (OUTPATIENT)
Dept: CARDIOLOGY | Facility: CLINIC | Age: 64
End: 2019-12-02

## 2019-12-02 NOTE — TELEPHONE ENCOUNTER
----- Message from Philipp Goel MD sent at 12/1/2019  6:24 PM CST -----  Please let the patient know that the echo result is ok. Heart muscles appears a little thick nothing needs to be done. Thanks

## 2019-12-02 NOTE — PROGRESS NOTES
Please let the patient know that the echo result is ok. Heart muscles appears a little thick nothing needs to be done. Thanks

## 2019-12-02 NOTE — TELEPHONE ENCOUNTER
I left patient a v/m letting her know that Dr Goel has reviewed the echo and everything is ok. Heart muscles appears a little thick nothing needs to be done. I asked patient to call us with any questions.

## 2019-12-03 ENCOUNTER — OFFICE VISIT (OUTPATIENT)
Dept: PSYCHIATRY | Facility: CLINIC | Age: 64
End: 2019-12-03
Payer: MEDICARE

## 2019-12-03 VITALS
DIASTOLIC BLOOD PRESSURE: 57 MMHG | HEART RATE: 85 BPM | HEIGHT: 62 IN | WEIGHT: 103.81 LBS | BODY MASS INDEX: 19.1 KG/M2 | SYSTOLIC BLOOD PRESSURE: 128 MMHG

## 2019-12-03 DIAGNOSIS — F20.9 SCHIZOPHRENIA, UNSPECIFIED TYPE: ICD-10-CM

## 2019-12-03 DIAGNOSIS — G47.00 INSOMNIA, UNSPECIFIED TYPE: Primary | ICD-10-CM

## 2019-12-03 DIAGNOSIS — G25.81 RESTLESS LEGS: ICD-10-CM

## 2019-12-03 DIAGNOSIS — G25.9 EXTRAPYRAMIDAL SYNDROME: ICD-10-CM

## 2019-12-03 PROCEDURE — 99214 OFFICE O/P EST MOD 30 MIN: CPT | Mod: S$GLB,,, | Performed by: INTERNAL MEDICINE

## 2019-12-03 PROCEDURE — 3288F PR FALLS RISK ASSESSMENT DOCUMENTED: ICD-10-PCS | Mod: CPTII,S$GLB,, | Performed by: INTERNAL MEDICINE

## 2019-12-03 PROCEDURE — 3074F SYST BP LT 130 MM HG: CPT | Mod: CPTII,S$GLB,, | Performed by: INTERNAL MEDICINE

## 2019-12-03 PROCEDURE — 3078F DIAST BP <80 MM HG: CPT | Mod: CPTII,S$GLB,, | Performed by: INTERNAL MEDICINE

## 2019-12-03 PROCEDURE — 3288F FALL RISK ASSESSMENT DOCD: CPT | Mod: CPTII,S$GLB,, | Performed by: INTERNAL MEDICINE

## 2019-12-03 PROCEDURE — 99999 PR PBB SHADOW E&M-EST. PATIENT-LVL III: CPT | Mod: PBBFAC,,, | Performed by: INTERNAL MEDICINE

## 2019-12-03 PROCEDURE — 1159F PR MEDICATION LIST DOCUMENTED IN MEDICAL RECORD: ICD-10-PCS | Mod: S$GLB,,, | Performed by: INTERNAL MEDICINE

## 2019-12-03 PROCEDURE — 1159F MED LIST DOCD IN RCRD: CPT | Mod: S$GLB,,, | Performed by: INTERNAL MEDICINE

## 2019-12-03 PROCEDURE — 99499 UNLISTED E&M SERVICE: CPT | Mod: S$GLB,,, | Performed by: INTERNAL MEDICINE

## 2019-12-03 PROCEDURE — 3074F PR MOST RECENT SYSTOLIC BLOOD PRESSURE < 130 MM HG: ICD-10-PCS | Mod: CPTII,S$GLB,, | Performed by: INTERNAL MEDICINE

## 2019-12-03 PROCEDURE — 3078F PR MOST RECENT DIASTOLIC BLOOD PRESSURE < 80 MM HG: ICD-10-PCS | Mod: CPTII,S$GLB,, | Performed by: INTERNAL MEDICINE

## 2019-12-03 PROCEDURE — 99214 PR OFFICE/OUTPT VISIT, EST, LEVL IV, 30-39 MIN: ICD-10-PCS | Mod: S$GLB,,, | Performed by: INTERNAL MEDICINE

## 2019-12-03 PROCEDURE — 1100F PR PT FALLS ASSESS DOC 2+ FALLS/FALL W/INJURY/YR: ICD-10-PCS | Mod: CPTII,S$GLB,, | Performed by: INTERNAL MEDICINE

## 2019-12-03 PROCEDURE — 1100F PTFALLS ASSESS-DOCD GE2>/YR: CPT | Mod: CPTII,S$GLB,, | Performed by: INTERNAL MEDICINE

## 2019-12-03 PROCEDURE — 99499 RISK ADDL DX/OHS AUDIT: ICD-10-PCS | Mod: S$GLB,,, | Performed by: INTERNAL MEDICINE

## 2019-12-03 PROCEDURE — 99999 PR PBB SHADOW E&M-EST. PATIENT-LVL III: ICD-10-PCS | Mod: PBBFAC,,, | Performed by: INTERNAL MEDICINE

## 2019-12-03 RX ORDER — GABAPENTIN 300 MG/1
300 CAPSULE ORAL NIGHTLY
Qty: 90 CAPSULE | Refills: 3 | Status: SHIPPED | OUTPATIENT
Start: 2019-12-03 | End: 2020-05-01 | Stop reason: SDUPTHER

## 2019-12-03 RX ORDER — RISPERIDONE 2 MG/1
2 TABLET ORAL NIGHTLY
Qty: 90 TABLET | Refills: 3 | Status: SHIPPED | OUTPATIENT
Start: 2019-12-03 | End: 2020-09-29 | Stop reason: SDUPTHER

## 2019-12-03 RX ORDER — TRAZODONE HYDROCHLORIDE 50 MG/1
50 TABLET ORAL NIGHTLY PRN
Qty: 90 TABLET | Refills: 1 | Status: SHIPPED | OUTPATIENT
Start: 2019-12-03 | End: 2020-05-01 | Stop reason: SDUPTHER

## 2019-12-03 RX ORDER — BENZTROPINE MESYLATE 0.5 MG/1
0.5 TABLET ORAL 2 TIMES DAILY
Qty: 180 TABLET | Refills: 3 | Status: SHIPPED | OUTPATIENT
Start: 2019-12-03 | End: 2020-01-29 | Stop reason: SDUPTHER

## 2019-12-03 RX ORDER — RISPERIDONE 4 MG/1
4 TABLET ORAL EVERY MORNING
Qty: 90 TABLET | Refills: 3 | Status: SHIPPED | OUTPATIENT
Start: 2019-12-03 | End: 2020-04-30 | Stop reason: SDUPTHER

## 2019-12-03 NOTE — PROGRESS NOTES
OUTPATIENT PSYCHIATRY RETURN VISIT    ENCOUNTER DATE:  12/5/2019  SITE:  [facility]  LENGTH OF SESSION:  25 minutes    CHIEF COMPLAINT:  Insomnia    HISTORY OF PRESENTING ILLNESS:  Bhavna Landry is a 64 y.o. female with history of Paranoid Schizophrenia, Depression, and Insomnia who presents for follow up appointment.     Plan at last appointment on 2/26/2019:  · Symptoms stable at this time.    · Continue Risperdal 4mg/2mg for psychosis and Benadryl 50mg qHS for insomnia and EPS prevention.  · Continue Effexor 150mg daily for depression.  · Continue Neurontin 300mg qHS for restless leg and insomnia.    History as told by patient:  Patient says she is doing well.  Mood has been stable.  No residual psychosis or depression.  Has had more trouble sleeping lately.  Also having more restless leg and does not believe Neurontin is helping completely.  No medication side effects.      Medication side effects:  TD  Medication compliance:  Yes    PSYCHIATRIC REVIEW OF SYSTEMS:  Trouble with sleep:  Yes  Appetite changes:  Denies  Weight changes:  Denies  Lack of energy:  Denies  Anhedonia:  Denies  Somatic symptoms:  Denies  Libido:  Denies  Anxiety/panic:  Denies  Guilty/hopeless:  Denies  Self-injurious behavior/risky behavior:  Denies  Any drugs:  Denies  Alcohol:  Denies    MEDICAL REVIEW OF SYSTEMS:  Complete review of systems performed covering Constitutional, Musculoskeletal, Neurologic.  All systems negative except for that covered in HPI.    PAST PSYCHIATRIC, MEDICAL, AND SOCIAL HISTORY REVIEWED  The patient's past medical, family and social history have been reviewed and updated as appropriate within the electronic medical record - see encounter notes.    MEDICATIONS:    Current Outpatient Medications:     albuterol (ACCUNEB) 1.25 mg/3 mL Nebu, TAKE 3 ML BY NEBULIZATION EVERY 6 HOURS AS NEEDED, Disp: 75 mL, Rfl: 0    albuterol (PROAIR HFA) 90 mcg/actuation inhaler, 2 puffs qid prn,  Disp: 3 each, Rfl: 3    albuterol (PROVENTIL/VENTOLIN HFA) 90 mcg/actuation inhaler, Inhale 2 puffs into the lungs every 6 (six) hours as needed for Wheezing., Disp: 1 each, Rfl: 11    amLODIPine (NORVASC) 5 MG tablet, TAKE 1 TABLET BY MOUTH EVERY DAY, Disp: 30 tablet, Rfl: 11    amoxicillin-clavulanate 875-125mg (AUGMENTIN) 875-125 mg per tablet, Take 1 tablet by mouth 2 (two) times daily., Disp: 20 tablet, Rfl: 0    apixaban (ELIQUIS) 5 mg Tab, Take 1 tablet (5 mg total) by mouth 2 (two) times daily., Disp: 60 tablet, Rfl: 6    atorvastatin (LIPITOR) 40 MG tablet, TAKE 1 TABLET(40 MG) BY MOUTH EVERY DAY, Disp: 90 tablet, Rfl: 3    baclofen (LIORESAL) 20 MG tablet, Take 1 tablet (20 mg total) by mouth 3 (three) times daily. (Patient taking differently: Take 20 mg by mouth 3 (three) times daily as needed. ), Disp: 90 tablet, Rfl: 11    benzonatate (TESSALON PERLES) 100 MG capsule, Take 1 capsule (100 mg total) by mouth every 6 (six) hours as needed for Cough. (Patient not taking: Reported on 11/25/2019), Disp: 30 capsule, Rfl: 1    benztropine (COGENTIN) 0.5 MG tablet, Take 1 tablet (0.5 mg total) by mouth 2 (two) times daily., Disp: 180 tablet, Rfl: 3    diclofenac sodium (VOLTAREN) 1 % Gel, Apply 2 g topically once daily., Disp: 1 Tube, Rfl: 3    gabapentin (NEURONTIN) 300 MG capsule, Take 1 capsule (300 mg total) by mouth every evening., Disp: 90 capsule, Rfl: 3    hydroCHLOROthiazide (HYDRODIURIL) 25 MG tablet, TAKE 1 TABLET(25 MG) BY MOUTH EVERY DAY (Patient taking differently: TAKE 1 TABLET(25 MG) BY MOUTH DAILY AS NEEDED FOR LLE SWELLING), Disp: 30 tablet, Rfl: 11    lisinopril 10 MG tablet, Take 10 mg by mouth daily as needed. , Disp: , Rfl:     metoprolol succinate (TOPROL-XL) 25 MG 24 hr tablet, Take 0.5 tablets (12.5 mg total) by mouth once daily., Disp: 30 tablet, Rfl: 3    predniSONE (DELTASONE) 20 MG tablet, 2 tabs po q day for 5 days (Patient not taking: Reported on 11/25/2019), Disp: 10  "tablet, Rfl: 0    risperiDONE (RISPERDAL) 2 MG tablet, Take 1 tablet (2 mg total) by mouth every evening., Disp: 90 tablet, Rfl: 3    risperiDONE (RISPERDAL) 4 MG tablet, Take 1 tablet (4 mg total) by mouth every morning., Disp: 90 tablet, Rfl: 3    secukinumab (COSENTYX PEN) 150 mg/mL PnIj, Inject 150 mg into the skin every 4 weeks, Disp: 1 mL, Rfl: 0    sulfaSALAzine (AZULFIDINE) 500 MG TbEC, TAKE 3 TABLETS(1500 MG) BY MOUTH TWICE DAILY, Disp: 540 tablet, Rfl: 0    traMADol (ULTRAM) 50 mg tablet, TAKE 1 TABLET BY MOUTH EVERY 12 HOURS AS NEEDED FOR PAIN, Disp: 60 tablet, Rfl: 0    traZODone (DESYREL) 50 MG tablet, Take 1 tablet (50 mg total) by mouth nightly as needed for Insomnia., Disp: 90 tablet, Rfl: 1    venlafaxine (EFFEXOR-XR) 150 MG Cp24, Take 1 capsule (150 mg total) by mouth once daily., Disp: 30 capsule, Rfl: 11  No current facility-administered medications for this visit.     Facility-Administered Medications Ordered in Other Visits:     phenylephrine HCL 2.5% ophthalmic solution 1 drop, 1 drop, Right Eye, On Call Procedure, Karen Song MD, 1 drop at 08/01/19 0648    proparacaine 0.5 % ophthalmic solution 1 drop, 1 drop, Right Eye, On Call Procedure, Karen Song MD, 1 drop at 08/01/19 0638    tropicamide 1% ophthalmic solution 1 drop, 1 drop, Right Eye, On Call Procedure, Karen Song MD, 1 drop at 08/01/19 0648    ALLERGIES:  Review of patient's allergies indicates:   Allergen Reactions    Adhesive      Other reaction(s): Rash    Chloromycetin [chloramphenicol sod succinate] Hives    Etanercept      Other reaction(s): recurrent infections    Nickel sutures [surgical stainless steel]      Allergic contact dermatitis     PSYCHIATRIC EXAM:  Vitals:    12/03/19 0958   BP: (!) 128/57   Pulse: 85   Weight: 47.1 kg (103 lb 13.4 oz)   Height: 5' 2" (1.575 m)     Appearance:  Well groomed, appearing healthy and of stated age  Behavior:  Cooperative, pleasant, no psychomotor " "agitation or retardation  Speech:  Normal rate, rhythm, prosody, and volume  Mood:  "Good"  Affect:  Congruent, smiling  Thought Process:  Linear, logical, goal directed  Thought Content:  Negative for suicidal ideation, homicidal ideation, delusions or hallucinations.  Associations:  Loose  Memory:  Poor  Level of Consciousness/Orientation:  Grossly intact  Fund of Knowledge:  Fair  Attention:  Good  Language:  Fluent, able to name abstract and concrete objects  Insight:  Fair  Judgment:  Intact  Psychomotor signs:  Involuntary oral buccal movements  Gait:  Normal    RELEVANT LABS/STUDIES:    Lab Results   Component Value Date    WBC 9.39 11/15/2019    HGB 12.2 11/15/2019    HCT 37.1 11/15/2019    MCV 97 11/15/2019     (H) 11/15/2019     BMP  Lab Results   Component Value Date     (L) 11/15/2019    K 4.9 11/15/2019     11/15/2019    CO2 21 (L) 11/15/2019    BUN 5 (L) 11/15/2019    CREATININE 0.9 11/15/2019    CALCIUM 9.8 11/15/2019    ANIONGAP 10 11/15/2019    ESTGFRAFRICA >60 11/15/2019    EGFRNONAA >60 11/15/2019     Lab Results   Component Value Date    ALT 26 11/15/2019    AST 30 11/15/2019    GGT 91 (H) 07/28/2014    ALKPHOS 71 11/15/2019    BILITOT 0.5 11/15/2019     Lab Results   Component Value Date    TSH 2.001 11/15/2019     Lab Results   Component Value Date    HGBA1C 5.3 12/11/2018       IMPRESSION:    Bhavna Landry is a 64 y.o. female with history of history of Paranoid Schizophrenia, Depression, and Insomnia who presents for follow up appointment.    Status/Progress:  Based on the examination today, the patient's problem(s) is/are well controlled.  New problems have not been presented today.    Risk Parameters:  Patient reports no suicidal ideation  Patient reports no homicidal ideation  Patient reports no self-injurious behavior  Patient reports no violent behavior    DIAGNOSES:    ICD-10-CM ICD-9-CM   1. Insomnia, unspecified type G47.00 780.52   2. Restless legs G25.81 333.94 "   3. Extrapyramidal syndrome G25.9 333.90   4. Schizophrenia, unspecified type F20.9 295.90      PLAN:  · Will start Trazodone 50mg qHS for insomnia and continue Neurontin 300mg qHS for restless legs.  Will avoid Requip due to concern it could worsen psychosis.   · EPS more obvious on exam today.  Restart Cogentin 0.5mg BID.    · Continue Risperdal 4mg/2mg for psychosis.  Symptoms stable.  · Continue Effexor 150mg daily for depression.  · Discussed with patient informed consent, risks versus benefits, alternative treatments, side effect profile and the inherent unpredictability of individual responses to these treatments.  The patient expresses understanding of the above and displays the capacity to agree with this current plan.    RETURN TO CLINIC:  Follow up in about 6 months (around 6/3/2020).

## 2019-12-09 ENCOUNTER — PATIENT OUTREACH (OUTPATIENT)
Dept: ADMINISTRATIVE | Facility: OTHER | Age: 64
End: 2019-12-09

## 2019-12-17 ENCOUNTER — CLINICAL SUPPORT (OUTPATIENT)
Dept: CARDIOLOGY | Facility: HOSPITAL | Age: 64
End: 2019-12-17
Attending: INTERNAL MEDICINE
Payer: MEDICARE

## 2019-12-17 ENCOUNTER — TELEPHONE (OUTPATIENT)
Dept: OPHTHALMOLOGY | Facility: CLINIC | Age: 64
End: 2019-12-17

## 2019-12-17 DIAGNOSIS — I48.0 PAF (PAROXYSMAL ATRIAL FIBRILLATION): ICD-10-CM

## 2019-12-17 PROCEDURE — 93272 ECG/REVIEW INTERPRET ONLY: CPT | Mod: ,,, | Performed by: INTERNAL MEDICINE

## 2019-12-17 PROCEDURE — 93272 CARDIAC EVENT MONITOR (CUPID ONLY): ICD-10-PCS | Mod: ,,, | Performed by: INTERNAL MEDICINE

## 2019-12-17 PROCEDURE — 93271 ECG/MONITORING AND ANALYSIS: CPT

## 2019-12-17 NOTE — TELEPHONE ENCOUNTER
----- Message from Paula Laureano sent at 12/17/2019 12:35 PM CST -----  Contact: Patient  Type:  Needs Medical Advice    Who Called: Jessica    Symptoms (please be specific): reschedule Appt    How long has patient had these symptoms:      Pharmacy name and phone #:      Would the patient rather a call back or a response via [facility]? Call    Best Call Back Number: [phone]    Additional Information:

## 2019-12-17 NOTE — TELEPHONE ENCOUNTER
----- Message from Paula Laureano sent at 12/17/2019 12:39 PM CST -----  Contact: Patient   Need Advice:      Reason For Call: Patient would like to reschedule appt for floaters      Communication Preference: [phone]       Additional Information:

## 2019-12-26 ENCOUNTER — TELEPHONE (OUTPATIENT)
Dept: CARDIOLOGY | Facility: CLINIC | Age: 64
End: 2019-12-26

## 2019-12-26 NOTE — TELEPHONE ENCOUNTER
Returned patient call regarding Holter monitor.    I left her V/Message, for patient to call us back.      RW                        ----- Message from Jess Fried sent at 12/26/2019  8:54 AM CST -----  Contact: Self  Pt calling in regards to heart monitor appt on 01-    Pt states she needs someone to call her in regards to a few questions on her heart monitor and if the doctor still needs to see her on 01-    Please advise pt can be reached at [phone]

## 2020-01-03 ENCOUNTER — TELEPHONE (OUTPATIENT)
Dept: RHEUMATOLOGY | Facility: CLINIC | Age: 65
End: 2020-01-03

## 2020-01-03 ENCOUNTER — PATIENT OUTREACH (OUTPATIENT)
Dept: ADMINISTRATIVE | Facility: OTHER | Age: 65
End: 2020-01-03

## 2020-01-03 NOTE — TELEPHONE ENCOUNTER
Called the pt and let her know the next available would be in march and she changed her mind and will keep this appointment

## 2020-01-03 NOTE — TELEPHONE ENCOUNTER
----- Message from Zeny Saxena sent at 1/3/2020  1:42 PM CST -----  Pt calling to resched appt from 1/6    Pt contact [phone]

## 2020-01-09 ENCOUNTER — HOSPITAL ENCOUNTER (OUTPATIENT)
Dept: RADIOLOGY | Facility: HOSPITAL | Age: 65
Discharge: HOME | End: 2020-01-09
Attending: INTERNAL MEDICINE
Payer: MEDICARE

## 2020-01-09 DIAGNOSIS — I65.23 BILATERAL CAROTID ARTERY STENOSIS: ICD-10-CM

## 2020-01-09 PROCEDURE — 93880 EXTRACRANIAL BILAT STUDY: CPT | Mod: TC

## 2020-01-09 PROCEDURE — 93880 EXTRACRANIAL BILAT STUDY: CPT | Mod: 26,,, | Performed by: RADIOLOGY

## 2020-01-09 PROCEDURE — 93880 US CAROTID BILATERAL: ICD-10-PCS | Mod: 26,,, | Performed by: RADIOLOGY

## 2020-01-12 DIAGNOSIS — I65.23 BILATERAL CAROTID ARTERY STENOSIS: Primary | ICD-10-CM

## 2020-01-12 DIAGNOSIS — E78.5 HYPERLIPIDEMIA, UNSPECIFIED HYPERLIPIDEMIA TYPE: ICD-10-CM

## 2020-01-12 RX ORDER — ATORVASTATIN CALCIUM 80 MG/1
TABLET, FILM COATED ORAL
Qty: 90 TABLET | Refills: 3 | Status: SHIPPED | OUTPATIENT
Start: 2020-01-12 | End: 2020-02-14 | Stop reason: SDUPTHER

## 2020-01-14 ENCOUNTER — TELEPHONE (OUTPATIENT)
Dept: INTERNAL MEDICINE | Facility: CLINIC | Age: 65
End: 2020-01-14

## 2020-01-14 RX ORDER — TRAMADOL HYDROCHLORIDE 50 MG/1
TABLET ORAL
Qty: 60 TABLET | Refills: 0 | Status: SHIPPED | OUTPATIENT
Start: 2020-01-14 | End: 2020-02-24 | Stop reason: SDUPTHER

## 2020-01-14 NOTE — TELEPHONE ENCOUNTER
Spoke with pt, notified of results and to increase atorvastatin to 80mg a day. Pt verbalized understanding  Labs scheduled

## 2020-01-14 NOTE — TELEPHONE ENCOUNTER
----- Message from Sadaf Vargas MD sent at 1/12/2020  9:39 PM CST -----  pls call - plaque in R carotid artery has increased.    So, increase atorvastatin in 80 mg daily, from 40 (may take 2 of 40 mg tabs until gone, and then 80 mg daily).      Be sure to discuss with your cardiologist next month.  Repeat u/s 1 year.     Schedule lipids, alt, ast 2 months.

## 2020-01-14 NOTE — TELEPHONE ENCOUNTER
----- Message from Lilly Rowland sent at 1/14/2020  9:51 AM CST -----  Contact: [phone]  Type: Rx    Name of medication(s): traMADol (ULTRAM) 50 mg tablet    Is this a refill? New rx? Refill     Pharmacy Name, Phone, & Location:[id number] - [place], [place] - [address] AT [facility]   [phone] (Phone)  [phone] (Fax)      Comments:please advise, thanks

## 2020-01-15 RX ORDER — ALBUTEROL SULFATE 90 UG/1
AEROSOL, METERED RESPIRATORY (INHALATION)
Qty: 3 EACH | Refills: 3 | OUTPATIENT
Start: 2020-01-15 | End: 2020-01-30 | Stop reason: SDUPTHER

## 2020-01-15 NOTE — PROGRESS NOTES
Refill Authorization Note     is requesting a refill authorization.    Brief assessment and rationale for refill: APPROVE: prr                                         Comments:     Requested Prescriptions   Signed Prescriptions Disp Refills    albuterol (PROAIR HFA) 90 mcg/actuation inhaler 3 each 3     Si puffs qid prn       Pulmonology:  Beta Agonists Failed - 2020  9:35 AM        Failed - If patient with asthma requests more than 1 inhaler every 3 months, assess for uncontrolled symptoms and/or notify provider.        Passed - Patient is at least 18 years old        Passed - Last BP in normal range within 360 days     BP Readings from Last 3 Encounters:   19 138/82   11/15/19 (!) 115/54   19 (!) 121/101              Passed - Last Heart Rate in normal range within 360 days     Pulse Readings from Last 3 Encounters:   19 82   19 86   11/15/19 70             Passed - Office visit in past 12 months or future 90 days     Recent Outpatient Visits            1 month ago Insomnia, unspecified type    [facility] Carol Ann Poe MD    1 month ago Smoking history    [facility] Internal Medicine Sadaf Vargas MD    1 month ago PAF (paroxysmal atrial fibrillation)    [facility] Philipp Goel MD    2 months ago Cough    [facility], [place]    2 months ago Bilateral acute serous otitis media, recurrence not specified    [facility] Deshpande PA-C          Future Appointments              In 2 weeks DMD Ward [facility] - Ophthalmology, [facility]    In 1 month MD Tiera [place] - Rheumatology, [place]    In 1 month MD Gial Sheehan - Cardiology, [facility]    In 2 months LAB, [facility] [place]

## 2020-01-25 ENCOUNTER — PATIENT MESSAGE (OUTPATIENT)
Dept: INTERNAL MEDICINE | Facility: CLINIC | Age: 65
End: 2020-01-25

## 2020-01-28 ENCOUNTER — TELEPHONE (OUTPATIENT)
Dept: INTERNAL MEDICINE | Facility: CLINIC | Age: 65
End: 2020-01-28

## 2020-01-28 ENCOUNTER — TELEPHONE (OUTPATIENT)
Dept: CARDIOLOGY | Facility: CLINIC | Age: 65
End: 2020-01-28

## 2020-01-28 DIAGNOSIS — F33.42 RECURRENT MAJOR DEPRESSIVE DISORDER, IN FULL REMISSION: ICD-10-CM

## 2020-01-28 DIAGNOSIS — G25.9 EXTRAPYRAMIDAL SYNDROME: ICD-10-CM

## 2020-01-28 NOTE — TELEPHONE ENCOUNTER
----- Message from Etienne Sood sent at 1/28/2020  1:37 PM CST -----  Contact: Patient [phone]  RX request - refill or new RX.  Is this a refill or new RX:  New   RX name and strength: Pro Air  Directions:   Is this a 30 day or 90 day RX:  90  Day supply  Pharmacy name and phone # [facility] [phone]  Comments:  Patient stating the Rx is needing as P.A., will be using a different pharmacy, which is above.    Please call an advise  Thank you

## 2020-01-28 NOTE — TELEPHONE ENCOUNTER
----- Message from Philipp Goel MD sent at 1/27/2020  4:42 PM CST -----  Please let her know that the monitor result is ok. Thanks

## 2020-01-29 RX ORDER — VENLAFAXINE HYDROCHLORIDE 150 MG/1
150 CAPSULE, EXTENDED RELEASE ORAL DAILY
Qty: 90 CAPSULE | Refills: 3 | Status: SHIPPED | OUTPATIENT
Start: 2020-01-29 | End: 2020-11-23 | Stop reason: SDUPTHER

## 2020-01-29 RX ORDER — BENZTROPINE MESYLATE 0.5 MG/1
0.5 TABLET ORAL 2 TIMES DAILY
Qty: 180 TABLET | Refills: 3 | Status: SHIPPED | OUTPATIENT
Start: 2020-01-29 | End: 2020-03-18 | Stop reason: SDUPTHER

## 2020-01-29 RX ORDER — ALBUTEROL SULFATE 90 UG/1
AEROSOL, METERED RESPIRATORY (INHALATION)
Status: CANCELLED | OUTPATIENT
Start: 2020-01-29

## 2020-01-29 NOTE — TELEPHONE ENCOUNTER
----- Message from Viktor Drake sent at 1/29/2020  2:33 PM CST -----  Contact: Pt [phone]  Is this a refill or new RX:  Refill    RX name and strength: albuterol (PROAIR HFA) 90 mcg/actuation inhaler    Quantity: 4     Pharmacy name and phone # [facility] - [address] [phone] (Phone) [phone] (Fax)      Comments:  It need a PA

## 2020-01-29 NOTE — TELEPHONE ENCOUNTER
----- Message from Paula Goff RN sent at 2020  2:32 PM CST -----  Pharmacy has been added to the record.    Thank you  ----- Message -----  From: Martha Cosby  Sent: 2020   1:54 PM CST  To: Paula Goff RN    Pt requeasting meds to be sent to mail order because it's cheaper for her.  The meds are    benztropine (COGENTIN) 0.5 MG tablet ()  And venlafaxine (EFFEXOR-XR) 150 MG Cp24.           [facility]    [phone] -- Pt only had contact $

## 2020-01-30 ENCOUNTER — TELEPHONE (OUTPATIENT)
Dept: INTERNAL MEDICINE | Facility: CLINIC | Age: 65
End: 2020-01-30

## 2020-01-30 NOTE — TELEPHONE ENCOUNTER
----- Message from Salena Lopes sent at 1/30/2020  1:08 PM CST -----  Contact: Shilpa with [employer]  [phone]  Ref# [id number]  For the PA for albuterol (PROAIR HFA) 90 mcg/actuation inhaler they need more information to complete this. They sent a fax to you yesterday about 3:30 pm.

## 2020-01-30 NOTE — TELEPHONE ENCOUNTER
----- Message from Marcia Reyes sent at 1/30/2020  9:23 AM CST -----  Contact: self   Pt is calling to follow up on the Rx for albuterol (PROAIR HFA) 90 mcg/actuation inhaler. Pt advised the Rx needs to be sent to [facility] - [address] [phone] (Phone)  [phone] (Fax)  Pt states she needs th Rx filled ASAP  Please call and advise pt.

## 2020-01-31 ENCOUNTER — PATIENT OUTREACH (OUTPATIENT)
Dept: ADMINISTRATIVE | Facility: OTHER | Age: 65
End: 2020-01-31

## 2020-01-31 ENCOUNTER — TELEPHONE (OUTPATIENT)
Dept: INTERNAL MEDICINE | Facility: CLINIC | Age: 65
End: 2020-01-31

## 2020-01-31 RX ORDER — ALBUTEROL SULFATE 90 UG/1
AEROSOL, METERED RESPIRATORY (INHALATION)
Qty: 54 G | Refills: 3 | Status: SHIPPED | OUTPATIENT
Start: 2020-01-31 | End: 2020-01-31 | Stop reason: SDUPTHER

## 2020-01-31 RX ORDER — ALBUTEROL SULFATE 90 UG/1
AEROSOL, METERED RESPIRATORY (INHALATION)
Qty: 54 G | Refills: 3 | Status: SHIPPED | OUTPATIENT
Start: 2020-01-31 | End: 2021-09-28 | Stop reason: SDUPTHER

## 2020-01-31 NOTE — TELEPHONE ENCOUNTER
"I have processed a PA for patients medication albuterol (PROAIR HFA) 90 mcg/actuation inhaler. Insurance has DENIED PA. Pt has called several times stating that her insurance says they do cover Proair HFA but they will not cover albuterol (proair HFA) this is very confusing and I do not know what else to do. Insurance has denied it. Pt is stating that you have to send in a script that reads "PROAIR only. It can not state albuterol".   "

## 2020-01-31 NOTE — PROGRESS NOTES
Refill Authorization Note     is requesting a refill authorization.    Brief assessment and rationale for refill: APPROVE: prr          Medication Therapy Plan: recent script was set to print-will resend to pharmacy; approve 12 more    Medication reconciliation completed: No                         Comments:   Requested Prescriptions   Pending Prescriptions Disp Refills    albuterol (PROAIR HFA) 90 mcg/actuation inhaler 25.5 g 3     Si puffs qid prn       Pulmonology:  Beta Agonists Failed - 2020  4:51 PM        Failed - If patient with asthma requests more than 1 inhaler every 3 months, assess for uncontrolled symptoms and/or notify provider.        Passed - Patient is at least 18 years old        Passed - Last BP in normal range within 360 days     BP Readings from Last 3 Encounters:   19 138/82   11/15/19 (!) 115/54   19 (!) 121/101              Passed - Last Heart Rate in normal range within 360 days     Pulse Readings from Last 3 Encounters:   19 82   19 86   11/15/19 70             Passed - Office visit in past 12 months or future 90 days     Recent Outpatient Visits            1 month ago Insomnia, unspecified type    [facility] Carol Ann Poe MD    2 months ago Smoking history    [facility] Sadaf Vargas MD    2 months ago PAF (paroxysmal atrial fibrillation)    [facility] Philipp Goel MD    2 months ago Cough    [facility], [place]    2 months ago Bilateral acute serous otitis media, recurrence not specified    [facility] Deshp[facility] PA-C          Future Appointments              In 3 days MD Ward De [place] - Rheumatology, [facility]    In 3 weeks [facility] DEXA1 LIMIT 350 LBS [facility]    In 3 weeks [facility] MAMMO1 [facility]    In 3 weeks MD [facility] - Cardiology, [facility]     In 4 weeks MD Ward [facility] - Ophthalmology, [facility]    In 1 month LAB, [facility] [place]                 Appointments  past 12m or future 3m with PCP    Date Provider   Last Visit   11/25/2019 Sadaf Vargas MD   Next Visit   1/30/2020 Sadaf Vargas MD        Date composed: 01/31/2020

## 2020-01-31 NOTE — TELEPHONE ENCOUNTER
Processed Prior authorization for RX albuterol (PROAIR HFA) 90 mcg/actuation inhaler    It was denied by insurance, I called to notify pt

## 2020-01-31 NOTE — TELEPHONE ENCOUNTER
----- Message from Salena Lopes sent at 1/31/2020  2:04 PM CST -----  Contact: Self  [phone]  Patient is calling for an RX refill or new RX.  Is this a refill or new RX:  refill  RX name and strength:albuterol (PROAIR HFA) 90 mcg/actuation inhaler   Directions (copy/paste from chart):  2 puffs qid prn  Is this a 30 day or 90 day RX: 90   Local pharmacy or mail order pharmacy: mail order  Pharmacy name and phone # (copy/paste from chart):  [facility] [phone] (Phone)  [phone] (Fax)    Comments:  Insurance will not pay for Albuterol.  Need this ordered today.

## 2020-01-31 NOTE — TELEPHONE ENCOUNTER
----- Message from Maricel Muñoz sent at 1/31/2020  4:29 PM CST -----  Contact: Patient [phone]  Requesting to speak with you regarding the Proair.    Please call and advise.    Thank You

## 2020-02-03 ENCOUNTER — OFFICE VISIT (OUTPATIENT)
Dept: RHEUMATOLOGY | Facility: CLINIC | Age: 65
End: 2020-02-03
Payer: MEDICARE

## 2020-02-03 ENCOUNTER — LAB VISIT (OUTPATIENT)
Dept: LAB | Facility: HOSPITAL | Age: 65
End: 2020-02-03
Attending: INTERNAL MEDICINE
Payer: MEDICARE

## 2020-02-03 VITALS
SYSTOLIC BLOOD PRESSURE: 135 MMHG | WEIGHT: 107.81 LBS | HEART RATE: 67 BPM | DIASTOLIC BLOOD PRESSURE: 76 MMHG | HEIGHT: 62 IN | BODY MASS INDEX: 19.84 KG/M2

## 2020-02-03 DIAGNOSIS — Z79.899 ON SULFASALAZINE THERAPY: ICD-10-CM

## 2020-02-03 DIAGNOSIS — L40.50 PSORIATIC ARTHRITIS: ICD-10-CM

## 2020-02-03 LAB
ALBUMIN SERPL BCP-MCNC: 3.8 G/DL (ref 3.5–5.2)
ALP SERPL-CCNC: 68 U/L (ref 55–135)
ALT SERPL W/O P-5'-P-CCNC: 13 U/L (ref 10–44)
ANION GAP SERPL CALC-SCNC: 7 MMOL/L (ref 8–16)
AST SERPL-CCNC: 23 U/L (ref 10–40)
BASOPHILS # BLD AUTO: 0.07 K/UL (ref 0–0.2)
BASOPHILS NFR BLD: 1 % (ref 0–1.9)
BILIRUB SERPL-MCNC: 0.5 MG/DL (ref 0.1–1)
BUN SERPL-MCNC: 5 MG/DL (ref 8–23)
CALCIUM SERPL-MCNC: 10.1 MG/DL (ref 8.7–10.5)
CHLORIDE SERPL-SCNC: 101 MMOL/L (ref 95–110)
CO2 SERPL-SCNC: 28 MMOL/L (ref 23–29)
CREAT SERPL-MCNC: 0.8 MG/DL (ref 0.5–1.4)
CRP SERPL-MCNC: 0.1 MG/L (ref 0–8.2)
DIFFERENTIAL METHOD: ABNORMAL
EOSINOPHIL # BLD AUTO: 0.3 K/UL (ref 0–0.5)
EOSINOPHIL NFR BLD: 4.2 % (ref 0–8)
ERYTHROCYTE [DISTWIDTH] IN BLOOD BY AUTOMATED COUNT: 12.4 % (ref 11.5–14.5)
ERYTHROCYTE [SEDIMENTATION RATE] IN BLOOD BY WESTERGREN METHOD: <2 MM/HR (ref 0–36)
EST. GFR  (AFRICAN AMERICAN): >60 ML/MIN/1.73 M^2
EST. GFR  (NON AFRICAN AMERICAN): >60 ML/MIN/1.73 M^2
GLUCOSE SERPL-MCNC: 85 MG/DL (ref 70–110)
HCT VFR BLD AUTO: 38.7 % (ref 37–48.5)
HGB BLD-MCNC: 12.6 G/DL (ref 12–16)
IMM GRANULOCYTES # BLD AUTO: 0.01 K/UL (ref 0–0.04)
IMM GRANULOCYTES NFR BLD AUTO: 0.1 % (ref 0–0.5)
LYMPHOCYTES # BLD AUTO: 2 K/UL (ref 1–4.8)
LYMPHOCYTES NFR BLD: 28.7 % (ref 18–48)
MCH RBC QN AUTO: 32.8 PG (ref 27–31)
MCHC RBC AUTO-ENTMCNC: 32.6 G/DL (ref 32–36)
MCV RBC AUTO: 101 FL (ref 82–98)
MONOCYTES # BLD AUTO: 0.5 K/UL (ref 0.3–1)
MONOCYTES NFR BLD: 7.1 % (ref 4–15)
NEUTROPHILS # BLD AUTO: 4.1 K/UL (ref 1.8–7.7)
NEUTROPHILS NFR BLD: 58.9 % (ref 38–73)
NRBC BLD-RTO: 0 /100 WBC
PLATELET # BLD AUTO: 305 K/UL (ref 150–350)
PMV BLD AUTO: 8.7 FL (ref 9.2–12.9)
POTASSIUM SERPL-SCNC: 4.7 MMOL/L (ref 3.5–5.1)
PROT SERPL-MCNC: 6.3 G/DL (ref 6–8.4)
RBC # BLD AUTO: 3.84 M/UL (ref 4–5.4)
SODIUM SERPL-SCNC: 136 MMOL/L (ref 136–145)
WBC # BLD AUTO: 6.94 K/UL (ref 3.9–12.7)

## 2020-02-03 PROCEDURE — 1101F PR PT FALLS ASSESS DOC 0-1 FALLS W/OUT INJ PAST YR: ICD-10-PCS | Mod: CPTII,S$GLB,, | Performed by: INTERNAL MEDICINE

## 2020-02-03 PROCEDURE — 36415 COLL VENOUS BLD VENIPUNCTURE: CPT

## 2020-02-03 PROCEDURE — 99999 PR PBB SHADOW E&M-EST. PATIENT-LVL V: ICD-10-PCS | Mod: PBBFAC,,, | Performed by: INTERNAL MEDICINE

## 2020-02-03 PROCEDURE — 3075F SYST BP GE 130 - 139MM HG: CPT | Mod: CPTII,S$GLB,, | Performed by: INTERNAL MEDICINE

## 2020-02-03 PROCEDURE — 1125F PR PAIN SEVERITY QUANTIFIED, PAIN PRESENT: ICD-10-PCS | Mod: S$GLB,,, | Performed by: INTERNAL MEDICINE

## 2020-02-03 PROCEDURE — 1159F MED LIST DOCD IN RCRD: CPT | Mod: S$GLB,,, | Performed by: INTERNAL MEDICINE

## 2020-02-03 PROCEDURE — 99214 PR OFFICE/OUTPT VISIT, EST, LEVL IV, 30-39 MIN: ICD-10-PCS | Mod: S$GLB,,, | Performed by: INTERNAL MEDICINE

## 2020-02-03 PROCEDURE — 3078F PR MOST RECENT DIASTOLIC BLOOD PRESSURE < 80 MM HG: ICD-10-PCS | Mod: CPTII,S$GLB,, | Performed by: INTERNAL MEDICINE

## 2020-02-03 PROCEDURE — 3075F PR MOST RECENT SYSTOLIC BLOOD PRESS GE 130-139MM HG: ICD-10-PCS | Mod: CPTII,S$GLB,, | Performed by: INTERNAL MEDICINE

## 2020-02-03 PROCEDURE — 1125F AMNT PAIN NOTED PAIN PRSNT: CPT | Mod: S$GLB,,, | Performed by: INTERNAL MEDICINE

## 2020-02-03 PROCEDURE — 1101F PT FALLS ASSESS-DOCD LE1/YR: CPT | Mod: CPTII,S$GLB,, | Performed by: INTERNAL MEDICINE

## 2020-02-03 PROCEDURE — 86140 C-REACTIVE PROTEIN: CPT

## 2020-02-03 PROCEDURE — 99999 PR PBB SHADOW E&M-EST. PATIENT-LVL V: CPT | Mod: PBBFAC,,, | Performed by: INTERNAL MEDICINE

## 2020-02-03 PROCEDURE — 85025 COMPLETE CBC W/AUTO DIFF WBC: CPT

## 2020-02-03 PROCEDURE — 80053 COMPREHEN METABOLIC PANEL: CPT

## 2020-02-03 PROCEDURE — 99214 OFFICE O/P EST MOD 30 MIN: CPT | Mod: S$GLB,,, | Performed by: INTERNAL MEDICINE

## 2020-02-03 PROCEDURE — 1159F PR MEDICATION LIST DOCUMENTED IN MEDICAL RECORD: ICD-10-PCS | Mod: S$GLB,,, | Performed by: INTERNAL MEDICINE

## 2020-02-03 PROCEDURE — 3078F DIAST BP <80 MM HG: CPT | Mod: CPTII,S$GLB,, | Performed by: INTERNAL MEDICINE

## 2020-02-03 PROCEDURE — 85652 RBC SED RATE AUTOMATED: CPT

## 2020-02-03 RX ORDER — DICLOFENAC SODIUM 10 MG/G
2 GEL TOPICAL DAILY
Qty: 3 TUBE | Refills: 3 | Status: SHIPPED | OUTPATIENT
Start: 2020-02-03 | End: 2020-02-06 | Stop reason: SDUPTHER

## 2020-02-03 RX ORDER — SULFASALAZINE 500 MG/1
TABLET, DELAYED RELEASE ORAL
Qty: 540 TABLET | Refills: 0 | Status: SHIPPED | OUTPATIENT
Start: 2020-02-03 | End: 2020-02-11 | Stop reason: SDUPTHER

## 2020-02-03 ASSESSMENT — ROUTINE ASSESSMENT OF PATIENT INDEX DATA (RAPID3)
MDHAQ FUNCTION SCORE: .1
PSYCHOLOGICAL DISTRESS SCORE: 2.2
PATIENT GLOBAL ASSESSMENT SCORE: 6.5
WHEN YOU AWAKENED IN THE MORNING OVER THE LAST WEEK, PLEASE INDICATE THE AMOUNT OF TIME IT TAKES UNTIL YOU ARE AS LIMBER AS YOU WILL BE FOR THE DAY: 10 MINUTES
TOTAL RAPID3 SCORE: 4.61
AM STIFFNESS SCORE: 1, YES
PAIN SCORE: 7
FATIGUE SCORE: 8

## 2020-02-03 NOTE — PATIENT INSTRUCTIONS
Get blood work today on the 2nd floor.  Get Shingrix vaccine today on the 1st floor.  Start getting Cosentyx shots twice a month. If you have an infection or you are taking antibiotics, do not take the monthly dose of Cosentyx.

## 2020-02-03 NOTE — PROGRESS NOTES
"Subjective:       Patient ID: Bhavna Landry is a 64 y.o. female.    Chief Complaint: Disease Management    HPI     The patient is a 64 year old female with a PMH of psoriatic arthritis, schizophrenia, paroxysmal Afib, s/p R TKA, osteoporosis, who presents for follow up. She was last seen 02/2019. She is on sekukinumab 150 mg monthly and sulfasalazine 1,500 mg BID for psoriatic arthritis. She complains of pain in joints of her hands and wrist as well as right 1st and 2nd toes. She complains of swelling of the digits of the hands often but not daily. She accidentally took an extra tablet of sulfasalazine 500 mg one day and the hand joint pain was significantly better. Her psoriasis symptoms have improved and she only has a small affected area above the right heel.    Review of Systems   Constitutional: Negative for chills, fever and unexpected weight change.   HENT: Negative for sore throat.    Eyes: Negative for photophobia and visual disturbance.   Respiratory: Negative for cough, shortness of breath and wheezing.    Cardiovascular: Negative for chest pain and leg swelling.   Gastrointestinal: Negative for abdominal pain, diarrhea and vomiting.   Genitourinary: Negative for dysuria and frequency.   Musculoskeletal: Positive for arthralgias and joint swelling. Negative for back pain.   Skin: Negative for rash.   Neurological: Negative for dizziness and headaches.   Psychiatric/Behavioral: Negative for behavioral problems and hallucinations.         Objective:   /76   Pulse 67   Ht 5' 2" (1.575 m)   Wt 48.9 kg (107 lb 12.9 oz)   BMI 19.72 kg/m²      Physical Exam   Constitutional: She is oriented to person, place, and time and well-developed, well-nourished, and in no distress. No distress.   HENT:   Head: Normocephalic and atraumatic.   Eyes: EOM are normal. Pupils are equal, round, and reactive to light.   Neck: Normal range of motion.   Cardiovascular: Normal rate and regular rhythm.    No murmur " heard.  Pulmonary/Chest: Effort normal and breath sounds normal. No respiratory distress. She has no wheezes.   Abdominal: Soft. Bowel sounds are normal. There is no tenderness.       Right Side Rheumatological Exam     Examination finds the shoulder, elbow, wrist, knee, 1st PIP, 3rd PIP and 5th PIP normal.    The patient is tender to palpation of the 1st MCP, 2nd PIP, 2nd MCP, 3rd MCP, 4th PIP, 4th MCP, 5th MCP, 1st MTP, 2nd MTP, 1st toe IP and 2nd toe IP    She has swelling of the 2nd MCP and 3rd MCP    Left Side Rheumatological Exam     Examination finds the shoulder, elbow, knee, 2nd PIP, 3rd PIP, 4th PIP, 4th MCP and 5th PIP normal.    The patient is tender to palpation of the wrist, 1st PIP, 1st MCP, 2nd MCP, 3rd MCP, 5th MCP and 5th DIP.    She has swelling of the 2nd MCP and 3rd MCP      Neurological: She is alert and oriented to person, place, and time.   Skin: Skin is warm and dry.     Mild psoriasis above right heel   Psychiatric: Affect and judgment normal.   Musculoskeletal: She exhibits no edema or deformity.                 Assessment/Plan         Psoriatic arthritis  Arthritis pain and swelling in bilateral hands and right 1st and 2nd toes.  Increase secukinumab dose from 150 mg to 300 mg monthly  · Continue sulfasalazine 1500 mg BID  · Continue diclofenac gel as needed  · Get standing labs today      Osteoporosis  DXA in 8/2018 with left femoral neck osteopenia (T score -2.0) and total hip osteoporosis (T score -2.8) with FRAX 10-year risk of major osteoporotic fracture 19.9% and hip fracture 3.8%.  · She has refused zoledronic acid infusion in the past. Discuss at next visit.      Immunizations  Up to date with influenza, pneumonia, and TDaP vaccinations.  · Shingrix 1st dose today and then 2nd dose in 2-6 months.         Problem List Items Addressed This Visit     None      Visit Diagnoses     Psoriatic arthritis        Relevant Medications    sulfaSALAzine (AZULFIDINE) 500 MG TbEC    diclofenac  sodium (VOLTAREN) 1 % Gel          Reed Rodriguez MD  Internal Medicine Resident  PGY-2

## 2020-02-03 NOTE — PROGRESS NOTES
I have personally taken the history and examined the patient and agree with the resident,s note as stated above       Detailed review of legacy documents: took etanercept 50mg twice weekly for 3 months Rx by Josefina Vega in [facility] for psoriasis started 7/12/05. Then 50mg sc weekly until 4/2008 and stopped b/o recurrent infections requiring frequent holding of Enbrel dosing. She did not want to resume for this reason.  Started secukinumab 150mg sc monthly      PsA  TJ 12  SJ 4  Pt global 65  ESR  CRP  DAS28   [id number]  DAPSA TJ 18  SJ 4  Pt global 6.5 Pt pain 7  CRP   = 35.7(HDA)  CDAI 24.5 (HDA)  Psoriasis, guttate resolved except psoriasis v. Xerosis right heel  Osteoporosis, on DXA 8/21/18 ordered by Dr. Vargas who had ordered zolendronic acid but then patient refused.   Gen OA  Lumbar spondylosis  Neck pain  Failed right TKA  Nickel allergy    Standing labs today  *Shingrix series  Sulfasalazine-EC 1500mg twice daily  Increase secukinumab to 300mg sc q 30 days  RTC 3 months with standing labs and discuss osteoporosis Rx at that time, why refused zolendronic acid from Dr. Vargas?

## 2020-02-03 NOTE — TELEPHONE ENCOUNTER
Proair dispensed 20 per claims data.     Last Prescribed Info:   Original Order:  albuterol (PROAIR HFA) 90 mcg/actuation inhaler [[id number]]      Pharmacy:  [facility] - [address] [place] #:  --     Pharmacy Comments:  --          Fill quantity remaining:  -- Fill quantity used:  -- Next fill due: --       Outpatient Medication Detail      Disp Refills Start End    albuterol (PROAIR HFA) 90 mcg/actuation inhaler 54 g 3 2020     Si puffs qid prn    Sent to pharmacy as: albuterol (PROAIR HFA) 90 mcg/actuation inhaler    Notes to Pharmacy: Please inactivate all prior scripts with same name and strength including on holds. Dispense Proair 3 inhalers.    E-Prescribing Status: Receipt confirmed by pharmacy (2020  5:17 PM CST)        Dispenses    Dispensed Days Supply Quantity Provider Pharmacy   PROAIR HFA  108 MCG/ACT AERS 2020 90 34 Inhaler

## 2020-02-06 DIAGNOSIS — L40.50 PSORIATIC ARTHRITIS: ICD-10-CM

## 2020-02-06 RX ORDER — DICLOFENAC SODIUM 10 MG/G
2 GEL TOPICAL 4 TIMES DAILY PRN
Qty: 3 TUBE | Refills: 3 | Status: SHIPPED | OUTPATIENT
Start: 2020-02-06 | End: 2021-10-25 | Stop reason: SDUPTHER

## 2020-02-11 DIAGNOSIS — L40.50 PSORIATIC ARTHRITIS: ICD-10-CM

## 2020-02-11 RX ORDER — SULFASALAZINE 500 MG/1
TABLET, DELAYED RELEASE ORAL
Qty: 540 TABLET | Refills: 0 | Status: SHIPPED | OUTPATIENT
Start: 2020-02-11 | End: 2020-04-03

## 2020-02-14 DIAGNOSIS — E78.5 HYPERLIPIDEMIA, UNSPECIFIED HYPERLIPIDEMIA TYPE: ICD-10-CM

## 2020-02-14 NOTE — TELEPHONE ENCOUNTER
----- Message from Lilly Rowland sent at 2/14/2020 11:16 AM CST -----  Contact: [phone]  Type: Rx    Name of medication(s): atorvastatin (LIPITOR) 80 MG tablet    Is this a refill? New rx? Refill     Pharmacy Name, Phone, & Location:[id number] - [place] - [address] [facility] [address]   [phone] (Phone)  [phone] (Fax)    Comments:patient takes two a day. Please advise, thanks

## 2020-02-17 RX ORDER — ATORVASTATIN CALCIUM 80 MG/1
80 TABLET, FILM COATED ORAL DAILY
Qty: 90 TABLET | Refills: 0 | Status: SHIPPED | OUTPATIENT
Start: 2020-02-17 | End: 2020-05-08

## 2020-02-17 NOTE — TELEPHONE ENCOUNTER
Refill Authorization Note     is requesting a refill authorization.    Brief assessment and rationale for refill: APPROVE: prr          Medication Therapy Plan: Updated sig to reflect current dosage                              Comments:   Requested Prescriptions   Signed Prescriptions Disp Refills    atorvastatin (LIPITOR) 80 MG tablet 90 tablet 0     Sig: Take 1 tablet (80 mg total) by mouth once daily.       Cardiovascular:  Antilipid - Statins Passed - 2/14/2020 11:42 AM        Passed - Patient is at least 18 years old        Passed - Office visit in past 12 months or future 90 days.     Recent Outpatient Visits            2 weeks ago Psoriatic arthritis    [facility] Mode Cornelius MD    2 months ago Insomnia, unspecified type    [facility] Psychiat[facility] Carol Ann Poe MD    2 months ago Smoking history    [facility] Sadaf Vargas MD    2 months ago PAF (paroxysmal atrial fibrillation)    [facility] Philipp Goel MD    3 months ago Cough    [facility] Dalia Cesar NP          Future Appointments              In 1 week [facility] DEXA1 LIMIT 350 LBS [facility]    In 1 week [facility] MAMMO1 [facility]    In 1 week Philipp Goel MD [facility]    In 1 week MD Ward Kwong - Ophthalmology, [facility]    In 4 weeks LAB, [facility] [place]    In 2 months LAB, [facility] [place]    In 2 months MD [facility] De [facility] Cruz - Rheumatology, [facility]                Passed - Lipid Panel completed in last 360 days     Lab Results   Component Value Date    CHOL 173 10/28/2019     (H) 10/28/2019    LDLCALC 58.6 (L) 10/28/2019    TRIG 52 10/28/2019             Passed - ALT is 94 or below and within 360 days     ALT   Date Value Ref Range Status   02/03/2020 13 10 - 44 U/L Final    11/15/2019 26 10 - 44 U/L Final   10/02/2019 15 10 - 44 U/L Final              Passed - AST is 54 or below and within 360 days     AST   Date Value Ref Range Status   02/03/2020 23 10 - 40 U/L Final   11/15/2019 30 10 - 40 U/L Final     Comment:     Specimen slightly hemolyzed   10/02/2019 23 10 - 40 U/L Final               Appointments  past 12m or future 3m with PCP    Date Provider   Last Visit   11/25/2019 Sadaf Vargas MD   Next Visit   Visit date not found Sadaf Vargas MD   .  ED visits in past 90 days: [unfilled]       Note composed:2:42 PM 02/17/2020

## 2020-02-24 ENCOUNTER — PATIENT OUTREACH (OUTPATIENT)
Dept: ADMINISTRATIVE | Facility: OTHER | Age: 65
End: 2020-02-24

## 2020-02-24 RX ORDER — TRAMADOL HYDROCHLORIDE 50 MG/1
TABLET ORAL
Qty: 60 TABLET | Refills: 0 | Status: SHIPPED | OUTPATIENT
Start: 2020-02-24 | End: 2020-04-10 | Stop reason: SDUPTHER

## 2020-02-24 RX ORDER — TRAMADOL HYDROCHLORIDE 50 MG/1
TABLET ORAL
Qty: 60 TABLET | Refills: 0 | Status: SHIPPED | OUTPATIENT
Start: 2020-02-24 | End: 2020-04-15

## 2020-02-24 NOTE — TELEPHONE ENCOUNTER
----- Message from Neisha Martinez sent at 2/24/2020  8:34 AM CST -----  Contact: [phone]  Pt called in regards to getting a Rx refill for     tramadol (ULTRAM) 50 mg tablet 60 tablet 0 1/14/2020  No  TAKE 1 TABLET BY MOUTH EVERY 12 HOURS AS NEEDED FOR PAIN    [facility] [phone]    Please advise

## 2020-02-26 ENCOUNTER — OFFICE VISIT (OUTPATIENT)
Dept: CARDIOLOGY | Facility: CLINIC | Age: 65
End: 2020-02-26
Payer: MEDICARE

## 2020-02-26 VITALS
HEIGHT: 62 IN | BODY MASS INDEX: 19.74 KG/M2 | WEIGHT: 107.25 LBS | OXYGEN SATURATION: 90 % | DIASTOLIC BLOOD PRESSURE: 53 MMHG | HEART RATE: 74 BPM | SYSTOLIC BLOOD PRESSURE: 107 MMHG

## 2020-02-26 DIAGNOSIS — E78.5 HYPERLIPIDEMIA, UNSPECIFIED HYPERLIPIDEMIA TYPE: ICD-10-CM

## 2020-02-26 DIAGNOSIS — I48.0 PAF (PAROXYSMAL ATRIAL FIBRILLATION): Primary | ICD-10-CM

## 2020-02-26 DIAGNOSIS — I77.9 BILATERAL CAROTID ARTERY DISEASE, UNSPECIFIED TYPE: ICD-10-CM

## 2020-02-26 DIAGNOSIS — I10 ESSENTIAL HYPERTENSION: Chronic | ICD-10-CM

## 2020-02-26 DIAGNOSIS — I87.2 VENOUS INCOMPETENCE: ICD-10-CM

## 2020-02-26 PROCEDURE — 99999 PR PBB SHADOW E&M-EST. PATIENT-LVL V: CPT | Mod: PBBFAC,,, | Performed by: INTERNAL MEDICINE

## 2020-02-26 PROCEDURE — 99499 RISK ADDL DX/OHS AUDIT: ICD-10-PCS | Mod: S$GLB,,, | Performed by: INTERNAL MEDICINE

## 2020-02-26 PROCEDURE — 99999 PR PBB SHADOW E&M-EST. PATIENT-LVL V: ICD-10-PCS | Mod: PBBFAC,,, | Performed by: INTERNAL MEDICINE

## 2020-02-26 PROCEDURE — 99214 PR OFFICE/OUTPT VISIT, EST, LEVL IV, 30-39 MIN: ICD-10-PCS | Mod: S$GLB,,, | Performed by: INTERNAL MEDICINE

## 2020-02-26 PROCEDURE — 99499 UNLISTED E&M SERVICE: CPT | Mod: S$GLB,,, | Performed by: INTERNAL MEDICINE

## 2020-02-26 PROCEDURE — 99214 OFFICE O/P EST MOD 30 MIN: CPT | Mod: S$GLB,,, | Performed by: INTERNAL MEDICINE

## 2020-02-26 NOTE — LETTER
February 26, 2020      Caden Madrid MD  [address]  [place] [facility] [address]           [facility]  [address]  [facility] [address]  Phone: [phone]          Patient: Bhavna Landry   MR Number: [id number]   YOB: 1955   Date of Visit: 2/26/2020       Dear Dr. Caden Madrid:    Thank you for referring Bhavna Landry to me for evaluation. Attached you will find relevant portions of my assessment and plan of care.    If you have questions, please do not hesitate to call me. I look forward to following Bhavna Landry along with you.    Sincerely,    Philipp Goel MD    Enclosure  CC:  No Recipients    If you would like to receive this communication electronically, please contact [email or web address] or [phone] to request more information on [employer] Link access.    For providers and/or their staff who would like to refer a patient to [facility], please contact us through our one-stop-shop provider referral line, [facility] , at [phone].    If you feel you have received this communication in error or would no longer like to receive these types of communications, please e-mail [email or web address]

## 2020-02-26 NOTE — PROGRESS NOTES
Subjective:   @Patient ID:  Bhavna Landry is a 64 y.o. female who presents for follow-up of atrial fibrillation         HPI:   Patient her for follow up  She had the event monitor with no evidence of a.fib  Had 3 episodes of chest pain, sharp lasted for 5 seconds. No exertional angina or LEMON.   She is tolerating NOAC well.     Historically   She went to the ED 11/15/2019 with A.fib with RVR  And she was spontaneously converted after cardizem. She had upper respiratory tract infection at that time.     No DM, no TIA or CVA. Remote hx of tobacco, No known ROMAN.   Has sig FH of heart disease, and strokes.     Pertinent Hx( FER, HTN, HLP, remote Tobacco abuse)      Prior cardiovascular  Hx  --------------------------------    - ECHO 6/2014  EF 60-65%    - Carotid U/S  20-39% stenosis b/l carotids    - Stress Echo 9/2016 Pharmacologic negative for ischemia     - Event monitor 12/17/2019 Sinus rhythm with 1st degree AV block, and a single episode of palpitations correlating with NSR with rare PACs.      Patient Active Problem List    Diagnosis Date Noted    PAF (paroxysmal atrial fibrillation) 11/20/2019    Bilateral carotid artery disease 11/20/2019    Bilateral sacroiliitis 10/22/2019    Venous incompetence 09/30/2019    Senile nuclear sclerosis 08/01/2019    Osteoporosis, post-menopausal 02/21/2019     She refused Reclast      Restless legs 12/14/2018    Myofascial pain syndrome 09/07/2018    Chronic pain 08/30/2018    Decreased ROM of lumbar spine 08/17/2018    Decreased strength 08/17/2018    Acute right-sided low back pain without sciatica 08/17/2018    Sacroiliac joint dysfunction 08/13/2018    Recurrent major depressive disorder, in partial remission 08/02/2018    Brow ptosis 07/31/2018    Pain of right tibia 11/30/2015    Knee pain, right 05/25/2015    S/P R knee surgery, TKA revision 09/08/2014    Internal derangement of right knee 08/20/2014    History of nickel allergy 07/16/2014     Exposure to nickel dust 07/16/2014    Fibromyalgia 04/17/2014    Syncope 02/14/2014    Retinal tear 10/08/2013    Posterior vitreous detachment, both eyes 09/17/2013    Retinal hemorrhage, left 09/17/2013    Nuclear sclerosis - Both Eyes 09/05/2013    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    Total knee replacement status 07/26/2013    Migraine without aura and without status migrainosus, not intractable 07/25/2013    Schizophrenia 05/28/2013    Insomnia 05/28/2013    Extrapyramidal syndrome 05/28/2013    Essential hypertension 07/23/2012    Hyperlipidemia 07/23/2012    PSA (psoriatic arthritis) 07/19/2012    Low back pain 07/19/2012           Left Arm BP - Sitting: (P) 111/63        LAST HbA1c  Lab Results   Component Value Date    HGBA1C 5.3 12/11/2018       Lipid panel  Lab Results   Component Value Date    CHOL 173 10/28/2019    CHOL 151 08/07/2019    CHOL 182 08/04/2018     Lab Results   Component Value Date     (H) 10/28/2019    HDL 90 (H) 08/07/2019    HDL 84 (H) 08/04/2018     Lab Results   Component Value Date    LDLCALC 58.6 (L) 10/28/2019    LDLCALC 50.6 (L) 08/07/2019    LDLCALC 85.0 08/04/2018     Lab Results   Component Value Date    TRIG 52 10/28/2019    TRIG 52 08/07/2019    TRIG 65 08/04/2018     Lab Results   Component Value Date    CHOLHDL 60.1 (H) 10/28/2019    CHOLHDL 59.6 (H) 08/07/2019    CHOLHDL 46.2 08/04/2018            Review of Systems   Constitution: Negative for chills and fever.   HENT: Negative for hearing loss and nosebleeds.    Eyes: Negative for blurred vision.   Cardiovascular: Negative for chest pain and palpitations.   Respiratory: Negative for hemoptysis and shortness of breath.    Hematologic/Lymphatic: Negative for bleeding problem.   Skin: Negative for itching.   Musculoskeletal: Negative for falls.   Gastrointestinal: Negative for abdominal pain and hematochezia.   Genitourinary: Negative for hematuria.   Neurological: Negative for dizziness and  loss of balance.   Psychiatric/Behavioral: Negative for altered mental status and depression.       Objective:   Physical Exam   Constitutional: She is oriented to person, place, and time. She appears well-developed and well-nourished.   HENT:   Head: Normocephalic and atraumatic.   Eyes: Conjunctivae are normal.   Neck: Neck supple. Carotid bruit is present (b/l  Rt>Lt).   Cardiovascular: Normal rate, regular rhythm and normal heart sounds. Exam reveals no gallop and no friction rub.   No murmur heard.  Pulmonary/Chest: Effort normal and breath sounds normal. No stridor. No respiratory distress. She has no wheezes.   Neurological: She is alert and oriented to person, place, and time.   Skin: Skin is warm and dry.   Psychiatric: She has a normal mood and affect. Her behavior is normal.       Assessment:     1. PAF (paroxysmal atrial fibrillation)    2. Bilateral carotid artery disease, unspecified type    3. Hyperlipidemia, unspecified hyperlipidemia type    4. Essential hypertension    5. Venous incompetence        Plan:     - Current in SR  - Patient prefer to stay on NOAC. Will keep it on board. She understands the bleeding risks  - Continue ASA/Statin  - Monitor BP at home and let us know if it is consistently >130/80    Pertinent cardiac images and EKG reviewed independently.    Continue with current medical plan and lifestyle changes.  Return sooner for concerns or questions. If symptoms persist go to the ED  I have reviewed all pertinent data including patient's medical history in detail and updated the computerized patient record.     No orders of the defined types were placed in this encounter.      Follow up as scheduled.     She expressed verbal understanding and agreed with the plan    Patient's Medications   New Prescriptions    No medications on file   Previous Medications    ALBUTEROL (ACCUNEB) 1.25 MG/3 ML NEBU    TAKE 3 ML BY NEBULIZATION EVERY 6 HOURS AS NEEDED    ALBUTEROL (PROAIR HFA) 90  MCG/ACTUATION INHALER    2 puffs qid prn    AMLODIPINE (NORVASC) 5 MG TABLET    TAKE 1 TABLET BY MOUTH EVERY DAY    AMOXICILLIN-CLAVULANATE 875-125MG (AUGMENTIN) 875-125 MG PER TABLET    Take 1 tablet by mouth 2 (two) times daily.    APIXABAN (ELIQUIS) 5 MG TAB    Take 1 tablet (5 mg total) by mouth 2 (two) times daily.    ATORVASTATIN (LIPITOR) 80 MG TABLET    Take 1 tablet (80 mg total) by mouth once daily.    BACLOFEN (LIORESAL) 20 MG TABLET    Take 1 tablet (20 mg total) by mouth 3 (three) times daily.    BENZONATATE (TESSALON PERLES) 100 MG CAPSULE    Take 1 capsule (100 mg total) by mouth every 6 (six) hours as needed for Cough.    BENZTROPINE (COGENTIN) 0.5 MG TABLET    Take 1 tablet (0.5 mg total) by mouth 2 (two) times daily.    DICLOFENAC SODIUM (VOLTAREN) 1 % GEL    Apply 2 g topically 4 (four) times daily as needed.    GABAPENTIN (NEURONTIN) 300 MG CAPSULE    Take 1 capsule (300 mg total) by mouth every evening.    HYDROCHLOROTHIAZIDE (HYDRODIURIL) 25 MG TABLET    TAKE 1 TABLET(25 MG) BY MOUTH EVERY DAY    LISINOPRIL 10 MG TABLET    Take 10 mg by mouth daily as needed.     METOPROLOL SUCCINATE (TOPROL-XL) 25 MG 24 HR TABLET    Take 0.5 tablets (12.5 mg total) by mouth once daily.    RISPERIDONE (RISPERDAL) 2 MG TABLET    Take 1 tablet (2 mg total) by mouth every evening.    RISPERIDONE (RISPERDAL) 4 MG TABLET    Take 1 tablet (4 mg total) by mouth every morning.    SECUKINUMAB (COSENTYX PEN) 150 MG/ML PNIJ    Inject 300 mg into the skin every 30 days.    SULFASALAZINE (AZULFIDINE) 500 MG TBEC    TAKE 3 TABLETS(1500 MG) BY MOUTH TWICE DAILY    TRAMADOL (ULTRAM) 50 MG TABLET    TAKE 1 TABLET BY MOUTH EVERY 12 HOURS AS NEEDED FOR PAIN    TRAMADOL (ULTRAM) 50 MG TABLET    TAKE 1 TABLET BY MOUTH EVERY 12 HOURS AS NEEDED FOR PAIN    TRAZODONE (DESYREL) 50 MG TABLET    Take 1 tablet (50 mg total) by mouth nightly as needed for Insomnia.    VARICELLA-ZOSTER GE-AS01B, PF, (SHINGRIX) 50 MCG/0.5 ML INJECTION     Inject into the muscle.    VENLAFAXINE (EFFEXOR-XR) 150 MG CP24    Take 1 capsule (150 mg total) by mouth once daily.   Modified Medications    No medications on file   Discontinued Medications    No medications on file

## 2020-02-26 NOTE — PATIENT INSTRUCTIONS
Aerobic Exercise for a Healthy Heart  Exercise is a lot more than an energy booster and a stress reliever. It also strengthens your heart muscle, lowers your blood pressure and cholesterol, and burns calories. It can also improve your resting muscle tone, and your mood.     Remember, some activity is better than none.    Choose an aerobic activity  Choose an activity that makes your heart and lungs work harder than they do when you rest or walk normally. This aerobic exercise can improve the way your heart and other muscles use oxygen. Make it fun by exercising with a friend and choosing an activity you enjoy. Here are some ideas:  · Walking  · Swimming  · Bicycling  · Stair climbing  · Dancing  · Jogging  · Gardening  Exercise regularly  If you havent been exercising regularly,  get your doctors OK first. Then start slowly.  Here are some tips:  · Begin exercising 3 times a week for 5 to 10 minutes at a time.  · When you feel comfortable, add a few minutes each session.  · Slowly build up to exercising 3 to 4 times each week. Each session should last for 40 minutes, on average, and involve moderate- to vigorous-intensity physical activity.  · If you have been given nitroglycerin, be sure to carry it when you exercise.  · If you get chest pain (angina) when youre exercising, stop what youre doing, take your nitroglycerin, and call your doctor.  Date Last Reviewed: 6/2/2016  © 6205-8945 [employer]. [address] [place] [address]. All rights reserved. This information is not intended as a substitute for professional medical care. Always follow your healthcare professional's instructions.          Eating Heart-Healthy Foods  Eating has a big impact on your heart health. In fact, eating healthier can improve several of your heart risks at once. For instance, it helps you manage weight, cholesterol, and blood pressure. Here are ideas to help you make heart-healthy changes without giving up  all the foods and flavors you love.  Getting started  · Talk with your health care provider about eating plans, such as the DASH or Mediterranean diet. You may also be referred to a dietitian.  · Change a few things at a time. Give yourself time to get used to a few eating changes before adding more.  · Work to create a tasty, healthy eating plan that you can stick to for the rest of your life.    Goals for healthy eating  Below are some tips to improve your eating habits:  · Limit saturated fats and trans fats. Saturated fats raise your levels of cholesterol, so keep these fats to a minimum. They are found in foods such as fatty meats, whole milk, cheese, and palm and coconut oils. Avoid trans fats because they lower good cholesterol as well as raise bad cholesterol. Trans fats are most often found in processed foods.  · Reduce sodium (salt) intake. Eating too much salt may increase your blood pressure. Limit your sodium intake to 2,300 milligrams (mg) per day, or less if your health care provider recommends it. Dining out less often and eating fewer processed foods are two great ways to decrease the amount of salt you consume.  · Managing calories. A calorie is a unit of energy. Your body burns calories for fuel, but if you eat more calories than your body burns, the extras are stored as fat. Your health care provider can help you create a diet plan to manage your calories. This will likely include eating healthier foods as well as exercising regularly. To help you track your progress, keep a diary to record what you eat and how often you exercise.  Choose the right foods  Aim to make these foods staples of your diet. If you have diabetes, you may have different recommendations than what is listed here:  · Fruits and vegetable provide plenty of nutrients without a lot of calories. At meals, fill half your plate with these foods. Split the other half of your plate between whole grains and lean protein.  · Whole  grains are high in fiber and rich in vitamins and nutrients. Good choices include whole-wheat bread, pasta, and brown rice.  · Lean proteins give you nutrition with less fat. Good choices include fish, skinless chicken, and beans.  · Low-fat or nonfat dairy provides nutrients without a lot of fat. Try low-fat or nonfat milk, cheese, or yogurt.  · Healthy fats can be good for you in small amounts. These are unsaturated fats, such as olive oil, nuts, and fish. Try to have at least 2 servings per week of fatty fish such as salmon, sardines, mackerel, rainbow trout, and albacore tuna. These contain omega-3 fatty acids, which are good for your heart. Flaxseed is another source of a heart-healthy fat.  More on heart healthy eating    Read food labels  Healthy eating starts at the grocery store. Be sure to pay attention to food labels on packaged foods. Look for products that are high in fiber and protein, and low in saturated fat, cholesterol, and sodium. Avoid products that contain trans fat. And pay close attention to serving size. For instance, if you plan to eat two servings, double all the numbers on the label.  Prepare food right  A key part of healthy cooking is cutting down on added fat and salt. Look on the internet for lower-fat, lower-sodium recipes. Also, try these tips:  · Remove fat from meat and skin from poultry before cooking.  · Skim fat from the surface of soups and sauces.  · Broil, boil, bake, steam, grill, and microwave food without added fats.  · Choose ingredients that spice up your food without adding calories, fat, or sodium. Try these items: horseradish, hot sauce, lemon, mustard, nonfat salad dressings, and vinegar. For salt-free herbs and spices, try basil, cilantro, cinnamon, pepper, and rosemary.  Date Last Reviewed: 6/25/2015  © 3944-5537 [employer]. [address] [place] [address], [place] [address]. All rights reserved. This information is not intended as a substitute for  professional medical care. Always follow your healthcare professional's instructions.          Understanding Atrial Fibrillation    An arrhythmia is any problem with the speed or pattern of the heartbeat. Atrial fibrillation (AFib) is a common type of arrhythmia. It causes fast, chaotic electrical signals in the atria. This leads to poor functioning of the heart. It also affects how much blood your heart can pump out to the body.  Afib may occur once in a while and go away on its own. Or it may continue for longer periods and need treatment.  AFib can lead to serious problems, such as stroke. Your healthcare provider will need to monitor and manage it.  What happens during atrial fibrillation?   The heart has an electrical system that sends signals to control the heartbeat. As signals move through the heart, they tell the hearts upper chambers (atria) and lower chambers (ventricles) when to squeeze (contract) and relax. This lets blood move through the heart and out to the body and lungs.  With AFib, the atria receive abnormal signals. This causes them to contract in a fast and irregular way, and out of sync with the ventricles. When this happens, the atria also have a harder time moving blood into the ventricles. Blood may then pool in the atria, which increases the risk for blood clots and stroke. The ventricles also may contract too quickly and irregularly. As a result, they may not pump blood to the body and lungs as well as they should. This can weaken the heart muscle over time and cause heart failure.  What causes atrial fibrillation?  AFib is more common in older adults. It has many possible causes including:  · Coronary artery disease  · Heart valve disease  · Heart attack  · Heart surgery  · High blood pressure  · Thyroid disease  · Diabetes  · Lung disease  · Sleep apnea  · Heavy alcohol use  In some cases of AFib, doctors do not know the cause.  What are the symptoms of atrial fibrillation?  AFib may or  may not cause symptoms. If symptoms do occur, they may include:  · A fast, pounding, irregular heartbeat  · Shortness of breath  · Tiredness  · Dizziness or fainting  · Chest pain  How is atrial fibrillation treated?  Treatments for AFib can include any of the options below.  · Medicines. You may be prescribed:  ¨ Heart rate medicines to help slow down the heartbeat  ¨ Heart rhythm medicines to help the heart beat more regularly  ¨ Anti-clotting medicines to help reduce the risk for blood clots and stroke  · Electrical cardioversion. Your healthcare provider uses special pads or paddles to send one or more brief electrical shocks to the heart. This can help reset the heartbeat to normal.  · Ablation. Long, thin tubes called catheters are threaded through a blood vessel to the heart. There, the catheters send out hot or cold energy to the areas causing the abnormal signals. This energy destroys the problem tissue or cells. This improves the chances that your heart will stay in normal rhythm without using medicines. If your heart rate and rhythm cant be controlled, you may need ablation and a pacemaker. These will help control the heart rate and regularity of the heartbeat.  · Surgery. During surgery, your healthcare provider may use different methods to create scar tissue in the areas of the heart causing the abnormal signals. The scar tissue disrupts the abnormal signals and may stop AFib from occurring.  What are the complications of atrial fibrillation?  These can include:  · Blood clots  · Stroke  · Heart failure. This problem occurs when the heart muscle weakens so much that it can no longer pump blood well.  When should I call my healthcare provider?  Call your healthcare provider right away if you have any of these:  · Symptoms that dont get better with treatment, or get worse  · New symptoms   Date Last Reviewed: 5/1/2016  © 7534-3194 [employer]. [address] [place] [address], [place] [address]. All  rights reserved. This information is not intended as a substitute for professional medical care. Always follow your healthcare professional's instructions.

## 2020-02-28 ENCOUNTER — OFFICE VISIT (OUTPATIENT)
Dept: OPHTHALMOLOGY | Facility: CLINIC | Age: 65
End: 2020-02-28
Payer: MEDICARE

## 2020-02-28 DIAGNOSIS — H35.62 RETINAL HEMORRHAGE, LEFT: ICD-10-CM

## 2020-02-28 DIAGNOSIS — L40.50 PSA (PSORIATIC ARTHRITIS): Primary | ICD-10-CM

## 2020-02-28 DIAGNOSIS — H43.813 POSTERIOR VITREOUS DETACHMENT, BOTH EYES: Primary | ICD-10-CM

## 2020-02-28 DIAGNOSIS — H33.311 RETINAL TEAR OF RIGHT EYE: ICD-10-CM

## 2020-02-28 PROCEDURE — 99999 PR PBB SHADOW E&M-EST. PATIENT-LVL II: ICD-10-PCS | Mod: PBBFAC,,, | Performed by: OPHTHALMOLOGY

## 2020-02-28 PROCEDURE — 92201 PR OPHTHALMOSCOPY, EXT, W/RET DRAW/SCLERAL DEPR, I&R, UNI/BI: ICD-10-PCS | Mod: S$GLB,,, | Performed by: OPHTHALMOLOGY

## 2020-02-28 PROCEDURE — 99999 PR PBB SHADOW E&M-EST. PATIENT-LVL II: CPT | Mod: PBBFAC,,, | Performed by: OPHTHALMOLOGY

## 2020-02-28 PROCEDURE — 92201 OPSCPY EXTND RTA DRAW UNI/BI: CPT | Mod: S$GLB,,, | Performed by: OPHTHALMOLOGY

## 2020-02-28 PROCEDURE — 92014 PR EYE EXAM, EST PATIENT,COMPREHESV: ICD-10-PCS | Mod: S$GLB,,, | Performed by: OPHTHALMOLOGY

## 2020-02-28 PROCEDURE — 92014 COMPRE OPH EXAM EST PT 1/>: CPT | Mod: S$GLB,,, | Performed by: OPHTHALMOLOGY

## 2020-02-28 NOTE — PROGRESS NOTES
HPI     Eye Problem      Additional comments: PVD OU              Comments     DLS: 07/31/2018 Dr. Skelton     Patient states she has had increase floaters since having cataract sx.     AT's PRN OU       A/P    1. VD OS -  No tears or breaks    RD precautions    2. PVD OD    3. NS OU    4. Duane's OD    5. VR tag with small flap OD @ 7:00 s/p barrier 10/13 with Dr. Moy    6. Pingueculum OD - AT's    7. Brow ptosis on right  X 1-2 years - Consult [facility] for Brow lift    8. Brief episode of color Va change  Lasted seconds.  Resolved without recurrence  Amaurosis precautions      2 yr.

## 2020-03-02 ENCOUNTER — TELEPHONE (OUTPATIENT)
Dept: PHARMACY | Facility: CLINIC | Age: 65
End: 2020-03-02

## 2020-03-02 NOTE — TELEPHONE ENCOUNTER
LVM for callback to inform patient that [facility] received prescription for COSENTYX and benefits investigation is required.  [facility] will be back in touch once insurance determination is received.

## 2020-03-11 NOTE — TELEPHONE ENCOUNTER
No. She should speak with her primary about memory, may need to see Neurology. RJQ    Call and let pt know

## 2020-03-13 NOTE — TELEPHONE ENCOUNTER
DOCUMENTATION ONLY:  Prior authorization for Cosentyx approved from 03/13/20 to 12/31/20    Case Id: [id number]    Co-pay: $1163.85    Patient Assistance IS required

## 2020-03-16 ENCOUNTER — PATIENT MESSAGE (OUTPATIENT)
Dept: PSYCHIATRY | Facility: CLINIC | Age: 65
End: 2020-03-16

## 2020-03-16 ENCOUNTER — TELEPHONE (OUTPATIENT)
Dept: INTERNAL MEDICINE | Facility: CLINIC | Age: 65
End: 2020-03-16

## 2020-03-16 NOTE — TELEPHONE ENCOUNTER
----- Message from Yenni Nagel sent at 3/16/2020  9:23 AM CDT -----  Contact: [phone]  Patient is requesting if the doctor can call in something stronger than the tramadol.      Please advise, thank you.

## 2020-03-16 NOTE — TELEPHONE ENCOUNTER
For what reason?  Back?next med would be hydrocodone, which is addictive, so we don't like to give except to take very sparingly.    Is she taking gabapentin? How much?  Does it help?

## 2020-03-18 ENCOUNTER — PATIENT MESSAGE (OUTPATIENT)
Dept: PSYCHIATRY | Facility: CLINIC | Age: 65
End: 2020-03-18

## 2020-03-18 ENCOUNTER — OFFICE VISIT (OUTPATIENT)
Dept: PSYCHIATRY | Facility: CLINIC | Age: 65
End: 2020-03-18
Payer: MEDICARE

## 2020-03-18 DIAGNOSIS — F20.0 PARANOID SCHIZOPHRENIA: Primary | ICD-10-CM

## 2020-03-18 DIAGNOSIS — F33.42 RECURRENT MAJOR DEPRESSIVE DISORDER, IN FULL REMISSION: ICD-10-CM

## 2020-03-18 DIAGNOSIS — G47.00 INSOMNIA, UNSPECIFIED TYPE: ICD-10-CM

## 2020-03-18 DIAGNOSIS — G25.9 EXTRAPYRAMIDAL SYNDROME: ICD-10-CM

## 2020-03-18 PROCEDURE — 3288F PR FALLS RISK ASSESSMENT DOCUMENTED: ICD-10-PCS | Mod: CPTII,S$GLB,, | Performed by: INTERNAL MEDICINE

## 2020-03-18 PROCEDURE — 1100F PTFALLS ASSESS-DOCD GE2>/YR: CPT | Mod: CPTII,S$GLB,, | Performed by: INTERNAL MEDICINE

## 2020-03-18 PROCEDURE — 1159F PR MEDICATION LIST DOCUMENTED IN MEDICAL RECORD: ICD-10-PCS | Mod: S$GLB,,, | Performed by: INTERNAL MEDICINE

## 2020-03-18 PROCEDURE — 99214 PR OFFICE/OUTPT VISIT, EST, LEVL IV, 30-39 MIN: ICD-10-PCS | Mod: 95,,, | Performed by: INTERNAL MEDICINE

## 2020-03-18 PROCEDURE — 99499 UNLISTED E&M SERVICE: CPT | Mod: 95,,, | Performed by: INTERNAL MEDICINE

## 2020-03-18 PROCEDURE — 3288F FALL RISK ASSESSMENT DOCD: CPT | Mod: CPTII,S$GLB,, | Performed by: INTERNAL MEDICINE

## 2020-03-18 PROCEDURE — 1159F MED LIST DOCD IN RCRD: CPT | Mod: S$GLB,,, | Performed by: INTERNAL MEDICINE

## 2020-03-18 PROCEDURE — 1100F PR PT FALLS ASSESS DOC 2+ FALLS/FALL W/INJURY/YR: ICD-10-PCS | Mod: CPTII,S$GLB,, | Performed by: INTERNAL MEDICINE

## 2020-03-18 PROCEDURE — 99214 OFFICE O/P EST MOD 30 MIN: CPT | Mod: 95,,, | Performed by: INTERNAL MEDICINE

## 2020-03-18 PROCEDURE — 99499 RISK ADDL DX/OHS AUDIT: ICD-10-PCS | Mod: 95,,, | Performed by: INTERNAL MEDICINE

## 2020-03-18 RX ORDER — BENZTROPINE MESYLATE 0.5 MG/1
0.5 TABLET ORAL 2 TIMES DAILY
Qty: 180 TABLET | Refills: 3 | Status: SHIPPED | OUTPATIENT
Start: 2020-03-18 | End: 2020-11-09

## 2020-03-18 NOTE — LETTER
March 18, 2020      [address]  [facility] [address]  Phone: [phone]  Fax: [phone]       Patient: Bhavna Landry   YOB: 1955  Date of Visit: 03/18/2020      To Whom It May Concern:      I am currently caring for Vidhi Haywood's mother, Jessica Landry, at [facility].  Vidhi will need to be out of work for the next 3 days due to her mother needing 24/7 care at home while she is ill.       Sincerely,      Carol Ann Poe MD

## 2020-03-18 NOTE — PROGRESS NOTES
"OUTPATIENT PSYCHIATRY RETURN VISIT    ENCOUNTER DATE:  3/22/2020  SITE:  [facility]  LENGTH OF SESSION:  25 minutes    The patient location is:  [place]; [place]  The chief complaint leading to consultation is:  Psychosis  Visit type:  Virtual visit with synchronous audio and video  Total time spent with patient:  25 minutes  Each patient to whom he or she provides medical services by telemedicine is:  (1) informed of the relationship between the physician and patient and the respective role of any other health care provider with respect to management of the patient; and (2) notified that he or she may decline to receive medical services by telemedicine and may withdraw from such care at any time.    CHIEF COMPLAINT:  Hallucinations and Paranoid    HISTORY OF PRESENTING ILLNESS:  Bhavna Landry is a 64 y.o. female with history of Paranoid Schizophrenia, Depression, and Insomnia who presents for follow up appointment.  She is accompanied by her daughter.      Plan at last appointment on 2/26/2019:  · Symptoms stable at this time.    · Continue Risperdal 4mg/2mg for psychosis and Benadryl 50mg qHS for insomnia and EPS prevention.  · Continue Effexor 150mg daily for depression.  · Continue Neurontin 300mg qHS for restless leg and insomnia.    History as told by patient:  Daughter reports she is more withdrawn and paranoid.  Patient reports, "it seems like [place] is trying to send everything out here."  Reports feelig like people are wanting to hurt her.  Says Mayomor Martell wants to hurt her with the coronavirus.  "Everybody is sending nerves to me.  Their skin is very dry.  She is sending aliens and dentists and old people to me."  Says she sometimes hears Jayshree saying "All the old peple."  Says coronavirus is really worrying her.  "Trying to get in the nursing homes. Black people's feet at the bottom are white.  Black people are in white people.  No one wants Medicare."  Denies that " the voices are telling her to hurt herself or others.  Has no desire to hurt anyone, including Mayor Jayshree.  Just feels worried others want to hurt her.  Says she is taking her medication regularly, except Cogentin and Trazodone.  Unsure why she stopped these.  Daughter says patient has been giving herself her medication so unsure if she has missed doses.  Denies visual hallucinations.  Not sleeping well.  Denies SI or HI.    Medication side effects:  TD (chronic)  Medication compliance:  Yes    PSYCHIATRIC REVIEW OF SYSTEMS:  Trouble with sleep:  Yes  Appetite changes:  Decreased  Weight changes:  Denies  Lack of energy:  Denies  Anhedonia:  Denies  Somatic symptoms:  Denies  Libido:  Denies  Anxiety/panic:  Paranoia as above  Guilty/hopeless:  Denies  Self-injurious behavior/risky behavior:  Denies  Any drugs:  Denies  Alcohol:  Denies    MEDICAL REVIEW OF SYSTEMS:  Complete review of systems performed covering Constitutional, Musculoskeletal, Neurologic.  All systems negative except for that covered in HPI.    PAST PSYCHIATRIC, MEDICAL, AND SOCIAL HISTORY REVIEWED  The patient's past medical, family and social history have been reviewed and updated as appropriate within the electronic medical record - see encounter notes.    MEDICATIONS:    Current Outpatient Medications:     albuterol (ACCUNEB) 1.25 mg/3 mL Nebu, TAKE 3 ML BY NEBULIZATION EVERY 6 HOURS AS NEEDED, Disp: 75 mL, Rfl: 0    albuterol (PROAIR HFA) 90 mcg/actuation inhaler, 2 puffs qid prn, Disp: 54 g, Rfl: 3    amLODIPine (NORVASC) 5 MG tablet, TAKE 1 TABLET BY MOUTH EVERY DAY, Disp: 30 tablet, Rfl: 11    amoxicillin-clavulanate 875-125mg (AUGMENTIN) 875-125 mg per tablet, Take 1 tablet by mouth 2 (two) times daily., Disp: 20 tablet, Rfl: 0    apixaban (ELIQUIS) 5 mg Tab, Take 1 tablet (5 mg total) by mouth 2 (two) times daily., Disp: 60 tablet, Rfl: 6    atorvastatin (LIPITOR) 80 MG tablet, Take 1 tablet (80 mg total) by mouth once daily.,  Disp: 90 tablet, Rfl: 0    baclofen (LIORESAL) 20 MG tablet, Take 1 tablet (20 mg total) by mouth 3 (three) times daily., Disp: 90 tablet, Rfl: 11    benzonatate (TESSALON PERLES) 100 MG capsule, Take 1 capsule (100 mg total) by mouth every 6 (six) hours as needed for Cough., Disp: 30 capsule, Rfl: 1    benztropine (COGENTIN) 0.5 MG tablet, Take 1 tablet (0.5 mg total) by mouth 2 (two) times daily., Disp: 180 tablet, Rfl: 3    diclofenac sodium (VOLTAREN) 1 % Gel, Apply 2 g topically 4 (four) times daily as needed., Disp: 3 Tube, Rfl: 3    gabapentin (NEURONTIN) 300 MG capsule, Take 1 capsule (300 mg total) by mouth every evening., Disp: 90 capsule, Rfl: 3    hydroCHLOROthiazide (HYDRODIURIL) 25 MG tablet, TAKE 1 TABLET(25 MG) BY MOUTH EVERY DAY, Disp: 30 tablet, Rfl: 11    lisinopril 10 MG tablet, Take 10 mg by mouth daily as needed. , Disp: , Rfl:     metoprolol succinate (TOPROL-XL) 25 MG 24 hr tablet, Take 0.5 tablets (12.5 mg total) by mouth once daily., Disp: 30 tablet, Rfl: 3    risperiDONE (RISPERDAL) 2 MG tablet, Take 1 tablet (2 mg total) by mouth every evening., Disp: 90 tablet, Rfl: 3    risperiDONE (RISPERDAL) 4 MG tablet, Take 1 tablet (4 mg total) by mouth every morning., Disp: 90 tablet, Rfl: 3    secukinumab (COSENTYX PEN) 150 mg/mL PnIj, Inject 300 mg into the skin every 30 days., Disp: 2 mL, Rfl: 2    sulfaSALAzine (AZULFIDINE) 500 MG TbEC, TAKE 3 TABLETS(1500 MG) BY MOUTH TWICE DAILY, Disp: 540 tablet, Rfl: 0    traMADol (ULTRAM) 50 mg tablet, TAKE 1 TABLET BY MOUTH EVERY 12 HOURS AS NEEDED FOR PAIN, Disp: 60 tablet, Rfl: 0    traMADol (ULTRAM) 50 mg tablet, TAKE 1 TABLET BY MOUTH EVERY 12 HOURS AS NEEDED FOR PAIN, Disp: 60 tablet, Rfl: 0    traZODone (DESYREL) 50 MG tablet, Take 1 tablet (50 mg total) by mouth nightly as needed for Insomnia., Disp: 90 tablet, Rfl: 1    varicella-zoster gE-AS01B, PF, (SHINGRIX) 50 mcg/0.5 mL injection, Inject into the muscle., Disp: 0.5 mL, Rfl:  "0    venlafaxine (EFFEXOR-XR) 150 MG Cp24, Take 1 capsule (150 mg total) by mouth once daily., Disp: 90 capsule, Rfl: 3  No current facility-administered medications for this visit.     Facility-Administered Medications Ordered in Other Visits:     phenylephrine HCL 2.5% ophthalmic solution 1 drop, 1 drop, Right Eye, On Call Procedure, Kaern Song MD, 1 drop at 08/01/19 0648    proparacaine 0.5 % ophthalmic solution 1 drop, 1 drop, Right Eye, On Call Procedure, Karen Song MD, 1 drop at 08/01/19 0638    tropicamide 1% ophthalmic solution 1 drop, 1 drop, Right Eye, On Call Procedure, Karen Song MD, 1 drop at 08/01/19 0648    ALLERGIES:  Review of patient's allergies indicates:   Allergen Reactions    Adhesive      Other reaction(s): Rash    Chloromycetin [chloramphenicol sod succinate] Hives    Etanercept      Other reaction(s): recurrent infections    Nickel sutures [surgical stainless steel]      Allergic contact dermatitis     PSYCHIATRIC EXAM:  There were no vitals filed for this visit.  Appearance:  Well groomed, appearing healthy and of stated age  Behavior:  Cooperative, pleasant, no psychomotor agitation or retardation  Speech:  Slow, monotone  Mood:  "Fine"  Affect:  Blunted  Thought Process:  Tangential, bizarre  Thought Content:  +Delusions, paranoia.  Negative for suicidal ideation, homicidal.  Not responding to unseen others during interview.  Associations:  Loose  Memory:  Poor  Level of Consciousness/Orientation:  Grossly intact  Fund of Knowledge:  Fair  Attention:  Good  Language:  Fluent, able to name abstract and concrete objects  Insight:  Poor due to psychosis  Judgment:  Poor due to psychosis  Psychomotor signs:  Involuntary oral buccal movements  Gait:  Unable to assess via video    RELEVANT LABS/STUDIES:    Lab Results   Component Value Date    WBC 6.94 02/03/2020    HGB 12.6 02/03/2020    HCT 38.7 02/03/2020     (H) 02/03/2020     02/03/2020 "     BMP  Lab Results   Component Value Date     02/03/2020    K 4.7 02/03/2020     02/03/2020    CO2 28 02/03/2020    BUN 5 (L) 02/03/2020    CREATININE 0.8 02/03/2020    CALCIUM 10.1 02/03/2020    ANIONGAP 7 (L) 02/03/2020    ESTGFRAFRICA >60.0 02/03/2020    EGFRNONAA >60.0 02/03/2020     Lab Results   Component Value Date    ALT 13 02/03/2020    AST 23 02/03/2020    GGT 91 (H) 07/28/2014    ALKPHOS 68 02/03/2020    BILITOT 0.5 02/03/2020     Lab Results   Component Value Date    TSH 2.001 11/15/2019     Lab Results   Component Value Date    HGBA1C 5.3 12/11/2018       IMPRESSION:    Bhavna Landry is a 64 y.o. female with history of history of Paranoid Schizophrenia, Depression, and Insomnia who presents for follow up appointment.    Status/Progress:  Based on the examination today, the patient's problem(s) is/are inadequately controlled.  New problems have not been presented today.    Risk Parameters:  Patient reports no suicidal ideation  Patient reports no homicidal ideation  Patient reports no self-injurious behavior  Patient reports no violent behavior    DIAGNOSES:    ICD-10-CM ICD-9-CM   1. Paranoid schizophrenia F20.0 295.30   2. Recurrent major depressive disorder, in full remission F33.42 296.36   3. Insomnia, unspecified type G47.00 780.52      PLAN:  · Daughter counted patient's pills immediately after appointment today.  She has only been taking the Risperdal 2mg tablets and not the 4mg tablets since the beginning of February.  Instructed daughter to increase Risperdal to 4mg BID and watch patient take the medication until paranoia has resolved.  Will then plan to decrease back to 4mg/2mg since patient has been stable on this for some time.  · Restart Cogentin 0.5mg BID.  Refill sent to pharmacy.  · Continue Effexor 150mg daily for depression.  · Restart Trazodone 50mg qHS for insomnia.  · Discussed with daughter safety plan, including that patient should be watched 24/7 until paranoia  resolves.  She has expressed understanding.  Letter provided so that daughter can take off of work to watch patient and her children who are at home.  · Discussed with patient and daughter informed consent, risks versus benefits, alternative treatments, side effect profile and the inherent unpredictability of individual responses to these treatments.  The patient's daughter expresses understanding of the above and displays the capacity to agree with this current plan.    RETURN TO CLINIC:  Follow up in about 6 weeks (around 4/29/2020).

## 2020-03-19 DIAGNOSIS — E78.5 HYPERLIPIDEMIA, UNSPECIFIED HYPERLIPIDEMIA TYPE: ICD-10-CM

## 2020-03-20 ENCOUNTER — PATIENT MESSAGE (OUTPATIENT)
Dept: PSYCHIATRY | Facility: CLINIC | Age: 65
End: 2020-03-20

## 2020-03-22 ENCOUNTER — PATIENT MESSAGE (OUTPATIENT)
Dept: PSYCHIATRY | Facility: CLINIC | Age: 65
End: 2020-03-22

## 2020-03-23 RX ORDER — ATORVASTATIN CALCIUM 40 MG/1
TABLET, FILM COATED ORAL
Qty: 90 TABLET | Refills: 3 | OUTPATIENT
Start: 2020-03-23

## 2020-03-23 NOTE — PROGRESS NOTES
Refill Authorization Note     is requesting a refill authorization.    Brief assessment and rationale for refill: QUICK DC: Inappropriate Request          Medication Therapy Plan: pt takes atorvastatin 80 mg; inappriate request, quick dc    Medication reconciliation completed: No                         Comments:   Last Prescribed Info:        atorvastatin (LIPITOR) 80 MG tablet 90 tablet 0 2/17/2020     Sig - Route: Take 1 tablet (80 mg total) by mouth once daily. - Oral    Sent to pharmacy as: atorvastatin (LIPITOR) 80 MG tablet    Notes to Pharmacy: Please delete all prior scripts with same name and strength including on holds.    E-Prescribing Status: Receipt confirmed by pharmacy (2/17/2020  2:41 PM CST)      Ordering User:  Kb Mon, PharmD            Diagnosis Association: Hyperlipidemia, unspecified hyperlipidemia type (E78.5)      Original Order:  atorvastatin (LIPITOR) 80 MG tablet [[id number]]      Pharmacy:  [id number] - [place], [place] - [address] AT [facility] [address]             Note composed:3:38 PM 03/23/2020

## 2020-04-01 DIAGNOSIS — L40.50 PSA (PSORIATIC ARTHRITIS): ICD-10-CM

## 2020-04-03 DIAGNOSIS — L40.50 PSORIATIC ARTHRITIS: ICD-10-CM

## 2020-04-03 RX ORDER — SULFASALAZINE 500 MG/1
TABLET, DELAYED RELEASE ORAL
Qty: 540 TABLET | Refills: 0 | Status: SHIPPED | OUTPATIENT
Start: 2020-04-03 | End: 2020-06-18 | Stop reason: SDUPTHER

## 2020-04-03 NOTE — TELEPHONE ENCOUNTER
Notified pt of message below. She verbalized understanding     Your ultrasound shows normal testicles without evidence of surgical pathology.  If you have persistent pain follow-up with urology.

## 2020-04-10 RX ORDER — TRAMADOL HYDROCHLORIDE 50 MG/1
TABLET ORAL
Qty: 60 TABLET | Refills: 0 | Status: SHIPPED | OUTPATIENT
Start: 2020-04-10 | End: 2020-04-15

## 2020-04-10 NOTE — TELEPHONE ENCOUNTER
----- Message from Corine Pope sent at 4/9/2020  4:44 PM CDT -----  Contact: self   Requesting an RX refill or new RX.  Is this a refill or new RX:    RX name and strength: traMADol (ULTRAM) 50 mg tablet  Directions (copy/paste from chart):  TAKE 1 TABLET BY MOUTH EVERY 12 HOURS AS NEEDED FOR PAIN  Is this a 30 day or 90 day RX:    Local pharmacy or mail order pharmacy:  local  Pharmacy name and phone # (copy/paste from chart):   [id number] - [place] - [address] AT [facility] [address] [phone] (Phone)  [phone] (Fax)  Comments:

## 2020-04-15 ENCOUNTER — TELEPHONE (OUTPATIENT)
Dept: INTERNAL MEDICINE | Facility: CLINIC | Age: 65
End: 2020-04-15

## 2020-04-15 ENCOUNTER — OFFICE VISIT (OUTPATIENT)
Dept: INTERNAL MEDICINE | Facility: CLINIC | Age: 65
End: 2020-04-15
Payer: MEDICARE

## 2020-04-15 DIAGNOSIS — R10.32 GROIN PAIN, LEFT: Primary | ICD-10-CM

## 2020-04-15 DIAGNOSIS — Z79.01 CHRONIC ANTICOAGULATION: ICD-10-CM

## 2020-04-15 DIAGNOSIS — L40.50 PSA (PSORIATIC ARTHRITIS): ICD-10-CM

## 2020-04-15 PROCEDURE — 99441 PR PHYSICIAN TELEPHONE EVALUATION 5-10 MIN: CPT | Mod: 95,,, | Performed by: INTERNAL MEDICINE

## 2020-04-15 PROCEDURE — 99441 PR PHYSICIAN TELEPHONE EVALUATION 5-10 MIN: ICD-10-PCS | Mod: 95,,, | Performed by: INTERNAL MEDICINE

## 2020-04-15 RX ORDER — HYDROCODONE BITARTRATE AND ACETAMINOPHEN 5; 325 MG/1; MG/1
1 TABLET ORAL EVERY 8 HOURS PRN
Qty: 30 TABLET | Refills: 0 | Status: SHIPPED | OUTPATIENT
Start: 2020-04-15 | End: 2020-05-15 | Stop reason: SDUPTHER

## 2020-04-15 NOTE — PROGRESS NOTES
Subjective:       Patient ID: Bhavna Landry is a 64 y.o. female.    Chief Complaint: No chief complaint on file.    HPI   The patient location is: home  The chief complaint leading to consultation is: groin pain  Visit type: audio only  Total time spent with patient:  8 min  Each patient to whom he or she provides medical services by telemedicine is:  (1) informed of the relationship between the physician and patient and the respective role of any other health care provider with respect to management of the patient; and (2) notified that he or she may decline to receive medical services by telemedicine and may withdraw from such care at any time.    Notes:  [id number] [phone]    C/o pain unrelieved with tramadol.  Different than sciatica.  Begins L buttocks into groin and top of thigh.  Severe pain.  9/10 with walking.   No trauma.  Began 1-2 weeks ago.    Also pain with sitting  No tingling down legs.  She has had ablation for lumbar radiculopathy.    She is taking eliquis for a fib..    Uses voltaren gel on hands.   Also with psoriatic arthritis, tx with azulfidine and cosentyx.        Review of Systems   Constitutional: Negative for fever and unexpected weight change.   HENT: Negative for congestion and postnasal drip.    Respiratory: Negative for chest tightness, shortness of breath and wheezing.    Cardiovascular: Negative for chest pain and leg swelling.   Gastrointestinal: Negative for abdominal pain, anal bleeding, constipation, diarrhea, nausea and vomiting.   Genitourinary: Negative for dysuria and urgency.   Musculoskeletal: Positive for arthralgias.   Skin: Negative for rash.   Neurological: Negative for headaches.   Psychiatric/Behavioral: Negative for dysphoric mood and sleep disturbance. The patient is not nervous/anxious.        Objective:      Physical Exam   Constitutional: She is oriented to person, place, and time. No distress.   Neurological: She is alert and oriented to person, place, and time.    Psychiatric: She has a normal mood and affect. Her behavior is normal.       Assessment:       1. Groin pain, left    2. Chronic anticoagulation    3. PSA (psoriatic arthritis)                      - r/o hip OA  2.       H/o lumbar radiculopathy, sacroiliitis, lumbar spondylosis.    Plan:       Diagnoses and all orders for this visit:    Groin pain, left  -     X-Ray Hip 2 View Left; Future    Chronic anticoagulation    PSA (psoriatic arthritis)       Change tramadol to hydrocodone.  Use cautiously.  May also try applying voltaren gel to affected areas.     Hip xray if few weeks.  ORtho thereafter if indicated.       Phone review.

## 2020-04-21 ENCOUNTER — TELEPHONE (OUTPATIENT)
Dept: PAIN MEDICINE | Facility: CLINIC | Age: 65
End: 2020-04-21

## 2020-04-21 ENCOUNTER — TELEPHONE (OUTPATIENT)
Dept: INTERNAL MEDICINE | Facility: CLINIC | Age: 65
End: 2020-04-21

## 2020-04-21 NOTE — TELEPHONE ENCOUNTER
Spoke with pt regarding back pain. I advised pt to come in to further discuss back and leg pain. Pt is scheduled for 5/5/20 at 9:30 with Daniel. Pt verbalized understanding and confirmed appt date and time.

## 2020-04-21 NOTE — TELEPHONE ENCOUNTER
I made appt for pt's DXA bone scan. I also informed her of an xray appt she had that she was not aware of. I printed out appointment reminders for all her appt's in May so she could organize accordingly. I told her I was doing this and she expressed much gratitude.    Omar Paiz

## 2020-04-21 NOTE — TELEPHONE ENCOUNTER
----- Message from Umm Zuluaga sent at 4/21/2020  9:23 AM CDT -----  Contact: [phone] self   Pt is requesting to speak with you re: possibly scheduling sciatica procedure. Please advise

## 2020-04-21 NOTE — TELEPHONE ENCOUNTER
----- Message from Viktor Drake sent at 4/21/2020 11:25 AM CDT -----  Contact: Pt [phone]  Type: Orders Request    What orders/ testing are being requested? Xray of her back and hip    Is there a future appointment scheduled for the patient with PCP? No    When? She said there should be an order in for this

## 2020-04-22 RX ORDER — METOPROLOL SUCCINATE 25 MG/1
12.5 TABLET, EXTENDED RELEASE ORAL DAILY
Qty: 15 TABLET | Refills: 11 | Status: SHIPPED | OUTPATIENT
Start: 2020-04-22 | End: 2020-08-17 | Stop reason: SDUPTHER

## 2020-04-22 NOTE — TELEPHONE ENCOUNTER
----- Message from Sheron Carrillo sent at 4/22/2020 12:01 PM CDT -----  Contact: [phone]  Type:  RX Refill Request    Who Called:self   Refill or New Rx:refill   RX Name and Strength:metoprolol succinate (TOPROL-XL) 25 MG 24 hr tablet  How is the patient currently taking it? (ex. 1XDay):Take 0.5 tablets (12.5 mg total) by mouth once daily.  Is this a 30 day or 90 day RX:30 day   Preferred Pharmacy with phone number:[facility][id number] - [place] - [address] AT [facility] [address]  Local or Mail Order:local  Ordering Provider:N/A  Would the patient rather a call back or a response via [facility]?callback   Best Call Back Number:[phone]  Additional Information:N/A

## 2020-04-27 ENCOUNTER — TELEPHONE (OUTPATIENT)
Dept: INTERNAL MEDICINE | Facility: CLINIC | Age: 65
End: 2020-04-27

## 2020-04-27 NOTE — TELEPHONE ENCOUNTER
----- Message from Peggy Padgett sent at 4/27/2020 12:19 PM CDT -----  Contact: self/[phone]  Patient  Stated that for the Past 2 week of October and November was sick. On nov 15 went to a fit, prior to that went to urgent care twice, and then call [facility]. Patient is wondering if back then she had the covid 19 would like to know if is possible to have the test for the antibody for the virus. If she can have it on Friday may 01 in [place] (patient aware that for public is until May 05/20).   Please call and advise. Thank you

## 2020-04-29 ENCOUNTER — OFFICE VISIT (OUTPATIENT)
Dept: PSYCHIATRY | Facility: CLINIC | Age: 65
End: 2020-04-29
Payer: MEDICARE

## 2020-04-29 DIAGNOSIS — F20.0 PARANOID SCHIZOPHRENIA: Primary | ICD-10-CM

## 2020-04-29 DIAGNOSIS — G47.00 INSOMNIA, UNSPECIFIED TYPE: ICD-10-CM

## 2020-04-29 DIAGNOSIS — G25.81 RESTLESS LEGS: ICD-10-CM

## 2020-04-29 DIAGNOSIS — F33.42 RECURRENT MAJOR DEPRESSIVE DISORDER, IN FULL REMISSION: ICD-10-CM

## 2020-04-29 PROCEDURE — 1159F PR MEDICATION LIST DOCUMENTED IN MEDICAL RECORD: ICD-10-PCS | Mod: ,,, | Performed by: INTERNAL MEDICINE

## 2020-04-29 PROCEDURE — 3288F PR FALLS RISK ASSESSMENT DOCUMENTED: ICD-10-PCS | Mod: CPTII,,, | Performed by: INTERNAL MEDICINE

## 2020-04-29 PROCEDURE — 1100F PTFALLS ASSESS-DOCD GE2>/YR: CPT | Mod: CPTII,,, | Performed by: INTERNAL MEDICINE

## 2020-04-29 PROCEDURE — 3288F FALL RISK ASSESSMENT DOCD: CPT | Mod: CPTII,,, | Performed by: INTERNAL MEDICINE

## 2020-04-29 PROCEDURE — 1100F PR PT FALLS ASSESS DOC 2+ FALLS/FALL W/INJURY/YR: ICD-10-PCS | Mod: CPTII,,, | Performed by: INTERNAL MEDICINE

## 2020-04-29 PROCEDURE — 1159F MED LIST DOCD IN RCRD: CPT | Mod: ,,, | Performed by: INTERNAL MEDICINE

## 2020-04-29 PROCEDURE — 99214 PR OFFICE/OUTPT VISIT, EST, LEVL IV, 30-39 MIN: ICD-10-PCS | Mod: 95,,, | Performed by: INTERNAL MEDICINE

## 2020-04-29 PROCEDURE — 99214 OFFICE O/P EST MOD 30 MIN: CPT | Mod: 95,,, | Performed by: INTERNAL MEDICINE

## 2020-04-29 RX ORDER — GABAPENTIN 400 MG/1
400 CAPSULE ORAL 3 TIMES DAILY
Qty: 90 CAPSULE | Refills: 11 | Status: CANCELLED | OUTPATIENT
Start: 2020-04-29 | End: 2021-04-29

## 2020-04-29 NOTE — PROGRESS NOTES
"OUTPATIENT PSYCHIATRY RETURN VISIT    ENCOUNTER DATE:  4/29/2020  SITE:  [facility]  LENGTH OF SESSION:  25 minutes    The patient location is:  [place]; [place]  The chief complaint leading to consultation is:  Psychosis  Visit type:  Virtual visit with synchronous audio and video  Total time spent with patient:  25 minutes  Each patient to whom he or she provides medical services by telemedicine is:  (1) informed of the relationship between the physician and patient and the respective role of any other health care provider with respect to management of the patient; and (2) notified that he or she may decline to receive medical services by telemedicine and may withdraw from such care at any time.    CHIEF COMPLAINT:  No chief complaint on file.    HISTORY OF PRESENTING ILLNESS:  Bhavna Landry is a 64 y.o. female with history of Paranoid Schizophrenia, Depression, and Insomnia who presents for follow up appointment.  She is accompanied by her daughter.      Plan at last appointment on 3/18/2020:      Plan at last appointment on 2/26/2019:  · Symptoms stable at this time.    · Continue Risperdal 4mg/2mg for psychosis and Benadryl 50mg qHS for insomnia and EPS prevention.  · Continue Effexor 150mg daily for depression.  · Continue Neurontin 300mg qHS for restless leg and insomnia.    History as told by patient:    Did not remember daughter's, difficult with directions, losing things, not remembering conversations from the day prior.  About 1 year.      Daughter reports she is more withdrawn and paranoid.  Patient reports, "it seems like [place] is trying to send everything out here."  Reports feelig like people are wanting to hurt her.  Says Mayor Jayshree wants to hurt her with the coronavirus.  "Everybody is sending nerves to me.  Their skin is very dry.  She is sending aliens and dentists and old people to me."  Says she sometimes hears Jayshree saying "All the old peple."  Says " "coronavirus is really worrying her.  "Trying to get in the nursing homes. Black people's feet at the bottom are white.  Black people are in white people.  No one wants Medicare."  Denies that the voices are telling her to hurt herself or others.  Has no desire to hurt anyone, including Mayor Jayshree.  Just feels worried others want to hurt her.  Says she is taking her medication regularly, except Cogentin and Trazodone.  Unsure why she stopped these.  Daughter says patient has been giving herself her medication so unsure if she has missed doses.  Denies visual hallucinations.  Not sleeping well.  Denies SI or HI.    Medication side effects:  TD (chronic)  Medication compliance:  Yes    PSYCHIATRIC REVIEW OF SYSTEMS:  Trouble with sleep:  Yes  Appetite changes:  Decreased  Weight changes:  Denies  Lack of energy:  Denies  Anhedonia:  Denies  Somatic symptoms:  Denies  Libido:  Denies  Anxiety/panic:  Paranoia as above  Guilty/hopeless:  Denies  Self-injurious behavior/risky behavior:  Denies  Any drugs:  Denies  Alcohol:  Denies    MEDICAL REVIEW OF SYSTEMS:  Complete review of systems performed covering Constitutional, Musculoskeletal, Neurologic.  All systems negative except for that covered in HPI.    PAST PSYCHIATRIC, MEDICAL, AND SOCIAL HISTORY REVIEWED  The patient's past medical, family and social history have been reviewed and updated as appropriate within the electronic medical record - see encounter notes.    MEDICATIONS:    Current Outpatient Medications:     albuterol (ACCUNEB) 1.25 mg/3 mL Nebu, TAKE 3 ML BY NEBULIZATION EVERY 6 HOURS AS NEEDED, Disp: 75 mL, Rfl: 0    albuterol (PROAIR HFA) 90 mcg/actuation inhaler, 2 puffs qid prn, Disp: 54 g, Rfl: 3    amLODIPine (NORVASC) 5 MG tablet, TAKE 1 TABLET BY MOUTH EVERY DAY, Disp: 30 tablet, Rfl: 11    amoxicillin-clavulanate 875-125mg (AUGMENTIN) 875-125 mg per tablet, Take 1 tablet by mouth 2 (two) times daily., Disp: 20 tablet, Rfl: 0    apixaban " (ELIQUIS) 5 mg Tab, Take 1 tablet (5 mg total) by mouth 2 (two) times daily., Disp: 60 tablet, Rfl: 6    atorvastatin (LIPITOR) 80 MG tablet, Take 1 tablet (80 mg total) by mouth once daily., Disp: 90 tablet, Rfl: 0    baclofen (LIORESAL) 20 MG tablet, Take 1 tablet (20 mg total) by mouth 3 (three) times daily., Disp: 90 tablet, Rfl: 11    benzonatate (TESSALON PERLES) 100 MG capsule, Take 1 capsule (100 mg total) by mouth every 6 (six) hours as needed for Cough., Disp: 30 capsule, Rfl: 1    benztropine (COGENTIN) 0.5 MG tablet, Take 1 tablet (0.5 mg total) by mouth 2 (two) times daily., Disp: 180 tablet, Rfl: 3    diclofenac sodium (VOLTAREN) 1 % Gel, Apply 2 g topically 4 (four) times daily as needed., Disp: 3 Tube, Rfl: 3    gabapentin (NEURONTIN) 300 MG capsule, Take 1 capsule (300 mg total) by mouth every evening., Disp: 90 capsule, Rfl: 3    hydroCHLOROthiazide (HYDRODIURIL) 25 MG tablet, TAKE 1 TABLET(25 MG) BY MOUTH EVERY DAY, Disp: 30 tablet, Rfl: 11    HYDROcodone-acetaminophen (NORCO) 5-325 mg per tablet, Take 1 tablet by mouth every 8 (eight) hours as needed for Pain., Disp: 30 tablet, Rfl: 0    lisinopril 10 MG tablet, Take 10 mg by mouth daily as needed. , Disp: , Rfl:     metoprolol succinate (TOPROL-XL) 25 MG 24 hr tablet, Take 0.5 tablets (12.5 mg total) by mouth once daily., Disp: 15 tablet, Rfl: 11    risperiDONE (RISPERDAL) 2 MG tablet, Take 1 tablet (2 mg total) by mouth every evening., Disp: 90 tablet, Rfl: 3    risperiDONE (RISPERDAL) 4 MG tablet, Take 1 tablet (4 mg total) by mouth every morning., Disp: 90 tablet, Rfl: 3    secukinumab (COSENTYX PEN) 150 mg/mL PnIj, Inject 300 mg into the skin every 30 days., Disp: 2 mL, Rfl: 2    sulfaSALAzine (AZULFIDINE) 500 MG TbEC, TAKE 3 TABLETS BY MOUTH  TWICE A DAY, Disp: 540 tablet, Rfl: 0    traZODone (DESYREL) 50 MG tablet, Take 1 tablet (50 mg total) by mouth nightly as needed for Insomnia., Disp: 90 tablet, Rfl: 1     "varicella-zoster gE-AS01B, PF, (SHINGRIX) 50 mcg/0.5 mL injection, Inject into the muscle., Disp: 0.5 mL, Rfl: 0    venlafaxine (EFFEXOR-XR) 150 MG Cp24, Take 1 capsule (150 mg total) by mouth once daily., Disp: 90 capsule, Rfl: 3  No current facility-administered medications for this visit.     Facility-Administered Medications Ordered in Other Visits:     phenylephrine HCL 2.5% ophthalmic solution 1 drop, 1 drop, Right Eye, On Call Procedure, Karen Song MD, 1 drop at 08/01/19 0648    proparacaine 0.5 % ophthalmic solution 1 drop, 1 drop, Right Eye, On Call Procedure, Karen Song MD, 1 drop at 08/01/19 0638    tropicamide 1% ophthalmic solution 1 drop, 1 drop, Right Eye, On Call Procedure, Karen Song MD, 1 drop at 08/01/19 0648    ALLERGIES:  Review of patient's allergies indicates:   Allergen Reactions    Adhesive      Other reaction(s): Rash    Chloromycetin [chloramphenicol sod succinate] Hives    Etanercept      Other reaction(s): recurrent infections    Nickel sutures [surgical stainless steel]      Allergic contact dermatitis     PSYCHIATRIC EXAM:  There were no vitals filed for this visit.  Appearance:  Well groomed, appearing healthy and of stated age  Behavior:  Cooperative, pleasant, no psychomotor agitation or retardation  Speech:  Slow, monotone  Mood:  "Fine"  Affect:  Blunted  Thought Process:  Tangential, bizarre  Thought Content:  +Delusions, paranoia.  Negative for suicidal ideation, homicidal.  Not responding to unseen others during interview.  Associations:  Loose  Memory:  Poor  Level of Consciousness/Orientation:  Grossly intact  Fund of Knowledge:  Fair  Attention:  Good  Language:  Fluent, able to name abstract and concrete objects  Insight:  Poor due to psychosis  Judgment:  Poor due to psychosis  Psychomotor signs:  Involuntary oral buccal movements  Gait:  Unable to assess via video    RELEVANT LABS/STUDIES:    Lab Results   Component Value Date    WBC 6.94 " 02/03/2020    HGB 12.6 02/03/2020    HCT 38.7 02/03/2020     (H) 02/03/2020     02/03/2020     BMP  Lab Results   Component Value Date     02/03/2020    K 4.7 02/03/2020     02/03/2020    CO2 28 02/03/2020    BUN 5 (L) 02/03/2020    CREATININE 0.8 02/03/2020    CALCIUM 10.1 02/03/2020    ANIONGAP 7 (L) 02/03/2020    ESTGFRAFRICA >60.0 02/03/2020    EGFRNONAA >60.0 02/03/2020     Lab Results   Component Value Date    ALT 13 02/03/2020    AST 23 02/03/2020    GGT 91 (H) 07/28/2014    ALKPHOS 68 02/03/2020    BILITOT 0.5 02/03/2020     Lab Results   Component Value Date    TSH 2.001 11/15/2019     Lab Results   Component Value Date    HGBA1C 5.3 12/11/2018       IMPRESSION:    Bhavna Landry is a 64 y.o. female with history of history of Paranoid Schizophrenia, Depression, and Insomnia who presents for follow up appointment.    Status/Progress:  Based on the examination today, the patient's problem(s) is/are inadequately controlled.  New problems have not been presented today.    Risk Parameters:  Patient reports no suicidal ideation  Patient reports no homicidal ideation  Patient reports no self-injurious behavior  Patient reports no violent behavior    DIAGNOSES:  No diagnosis found.   PLAN:  · Daughter counted patient's pills immediately after appointment today.  She has only been taking the Risperdal 2mg tablets and not the 4mg tablets since the beginning of February.  Instructed daughter to increase Risperdal to 4mg BID and watch patient take the medication until paranoia has resolved.  Will then plan to decrease back to 4mg/2mg since patient has been stable on this for some time.  · Restart Cogentin 0.5mg BID.  Refill sent to pharmacy.  · Continue Effexor 150mg daily for depression.  · Restart Trazodone 50mg qHS for insomnia.  · Discussed with daughter safety plan, including that patient should be watched 24/7 until paranoia resolves.  She has expressed understanding.  Letter provided so  that daughter can take off of work to watch patient and her children who are at home.  · Discussed with patient and daughter informed consent, risks versus benefits, alternative treatments, side effect profile and the inherent unpredictability of individual responses to these treatments.  The patient's daughter expresses understanding of the above and displays the capacity to agree with this current plan.    RETURN TO CLINIC:  No follow-ups on file.

## 2020-04-30 ENCOUNTER — TELEPHONE (OUTPATIENT)
Dept: CARDIOLOGY | Facility: CLINIC | Age: 65
End: 2020-04-30

## 2020-04-30 NOTE — TELEPHONE ENCOUNTER
I called the pt back in regards to the message. The pt stated she would like to stop taking eliquis and her fluid pill because she is always tired since starting the medications.     I asked her if she has any other symptoms such as chest pains, SOB, or feels weak. The pt stated no, just tired.  She also stated she had a holter monitor and nothing was detected. She said she does not see the need to continue the medications now.    I informed the pt I will let the doctor know of this message then follow up.     ND

## 2020-04-30 NOTE — TELEPHONE ENCOUNTER
----- Message from Mahnaz Laws sent at 4/30/2020  4:01 PM CDT -----  Contact: Self  Type:  Needs Medical Advice    Who Called: Self  Reason: Trying to see if she can get off her ELIQUIS and her fluid pill  Would the patient rather a call back or a response via [facility]? Callback  Best Call Back Number: [phone]  Additional Information: None

## 2020-05-01 ENCOUNTER — HOSPITAL ENCOUNTER (OUTPATIENT)
Dept: RADIOLOGY | Facility: HOSPITAL | Age: 65
Discharge: HOME | End: 2020-05-01
Attending: INTERNAL MEDICINE
Payer: MEDICARE

## 2020-05-01 DIAGNOSIS — G47.00 INSOMNIA, UNSPECIFIED TYPE: ICD-10-CM

## 2020-05-01 DIAGNOSIS — G25.81 RESTLESS LEGS: ICD-10-CM

## 2020-05-01 DIAGNOSIS — R10.32 GROIN PAIN, LEFT: ICD-10-CM

## 2020-05-01 PROCEDURE — 73502 XR HIP 2 VIEW LEFT: ICD-10-PCS | Mod: 26,LT,, | Performed by: RADIOLOGY

## 2020-05-01 PROCEDURE — 73502 X-RAY EXAM HIP UNI 2-3 VIEWS: CPT | Mod: 26,LT,, | Performed by: RADIOLOGY

## 2020-05-01 PROCEDURE — 73502 X-RAY EXAM HIP UNI 2-3 VIEWS: CPT | Mod: TC,FY,PO,LT

## 2020-05-01 RX ORDER — GABAPENTIN 400 MG/1
400 CAPSULE ORAL NIGHTLY
Qty: 90 CAPSULE | Refills: 3 | Status: SHIPPED | OUTPATIENT
Start: 2020-05-01 | End: 2020-07-16

## 2020-05-01 RX ORDER — TRAZODONE HYDROCHLORIDE 50 MG/1
50 TABLET ORAL NIGHTLY PRN
Qty: 90 TABLET | Refills: 3 | Status: SHIPPED | OUTPATIENT
Start: 2020-05-01 | End: 2020-10-21 | Stop reason: SDUPTHER

## 2020-05-03 NOTE — PROGRESS NOTES
OUTPATIENT PSYCHIATRY RETURN VISIT    ENCOUNTER DATE:  5/3/2020  SITE:  [facility], [facility]  LENGTH OF SESSION:  20 minutes    The patient location is:  [place]; [place]  The chief complaint leading to consultation is:  Psychosis, Insomnia  Visit type:  Virtual visit with synchronous audio and video  Total time spent with patient:  20 minutes  Each patient to whom he or she provides medical services by telemedicine is:  (1) informed of the relationship between the physician and patient and the respective role of any other health care provider with respect to management of the patient; and (2) notified that he or she may decline to receive medical services by telemedicine and may withdraw from such care at any time.    CHIEF COMPLAINT:  Psychosis and Insomnia    HISTORY OF PRESENTING ILLNESS:  Bhavna Landry is a 64 y.o. female with history of Paranoid Schizophrenia, Depression, and Insomnia who presents for follow up appointment.  She is accompanied by her daughter.      Plan at last appointment on 3/18/2020:  · Daughter counted patient's pills immediately after appointment today.  She has only been taking the Risperdal 2mg tablets and not the 4mg tablets since the beginning of February.  Instructed daughter to increase Risperdal to 4mg BID and watch patient take the medication until paranoia has resolved.  Will then plan to decrease back to 4mg/2mg since patient has been stable on this for some time.  · Restart Cogentin 0.5mg BID.  Refill sent to pharmacy.  · Continue Effexor 150mg daily for depression.  · Restart Trazodone 50mg qHS for insomnia.  · Discussed with daughter safety plan, including that patient should be watched 24/7 until paranoia resolves.  She has expressed understanding.  Letter provided so that daughter can take off of work to watch patient and her children who are at home.  · Discussed with patient and daughter informed consent, risks versus benefits, alternative treatments,  side effect profile and the inherent unpredictability of individual responses to these treatments.  The patient's daughter expresses understanding of the above and displays the capacity to agree with this current plan.    History as told by patient:  Did not remember daughter's, difficult with directions, losing things, not remembering conversations from the day prior.  About 1 year.    Medication side effects:  No  Medication compliance:  Yes    PSYCHIATRIC REVIEW OF SYSTEMS:  Trouble with sleep:  Yes  Appetite changes:  Decreased  Weight changes:  Denies  Lack of energy:  Denies  Anhedonia:  Denies  Somatic symptoms:  Denies  Libido:  Denies  Anxiety/panic:  Paranoia as above  Guilty/hopeless:  Denies  Self-injurious behavior/risky behavior:  Denies  Any drugs:  Denies  Alcohol:  Denies    MEDICAL REVIEW OF SYSTEMS:  Complete review of systems performed covering Constitutional, Musculoskeletal, Neurologic.  All systems negative except for that covered in HPI.    PAST PSYCHIATRIC, MEDICAL, AND SOCIAL HISTORY REVIEWED  The patient's past medical, family and social history have been reviewed and updated as appropriate within the electronic medical record - see encounter notes.    MEDICATIONS:    Current Outpatient Medications:     albuterol (ACCUNEB) 1.25 mg/3 mL Nebu, TAKE 3 ML BY NEBULIZATION EVERY 6 HOURS AS NEEDED, Disp: 75 mL, Rfl: 0    albuterol (PROAIR HFA) 90 mcg/actuation inhaler, 2 puffs qid prn, Disp: 54 g, Rfl: 3    amLODIPine (NORVASC) 5 MG tablet, TAKE 1 TABLET BY MOUTH EVERY DAY, Disp: 30 tablet, Rfl: 11    amoxicillin-clavulanate 875-125mg (AUGMENTIN) 875-125 mg per tablet, Take 1 tablet by mouth 2 (two) times daily., Disp: 20 tablet, Rfl: 0    atorvastatin (LIPITOR) 80 MG tablet, Take 1 tablet (80 mg total) by mouth once daily., Disp: 90 tablet, Rfl: 0    baclofen (LIORESAL) 20 MG tablet, Take 1 tablet (20 mg total) by mouth 3 (three) times daily., Disp: 90 tablet, Rfl: 11    benzonatate  (TESSALON PERLES) 100 MG capsule, Take 1 capsule (100 mg total) by mouth every 6 (six) hours as needed for Cough., Disp: 30 capsule, Rfl: 1    benztropine (COGENTIN) 0.5 MG tablet, Take 1 tablet (0.5 mg total) by mouth 2 (two) times daily., Disp: 180 tablet, Rfl: 3    diclofenac sodium (VOLTAREN) 1 % Gel, Apply 2 g topically 4 (four) times daily as needed., Disp: 3 Tube, Rfl: 3    gabapentin (NEURONTIN) 400 MG capsule, Take 1 capsule (400 mg total) by mouth every evening., Disp: 90 capsule, Rfl: 3    hydroCHLOROthiazide (HYDRODIURIL) 25 MG tablet, TAKE 1 TABLET(25 MG) BY MOUTH EVERY DAY, Disp: 30 tablet, Rfl: 11    HYDROcodone-acetaminophen (NORCO) 5-325 mg per tablet, Take 1 tablet by mouth every 8 (eight) hours as needed for Pain., Disp: 30 tablet, Rfl: 0    lisinopril 10 MG tablet, Take 10 mg by mouth daily as needed. , Disp: , Rfl:     metoprolol succinate (TOPROL-XL) 25 MG 24 hr tablet, Take 0.5 tablets (12.5 mg total) by mouth once daily., Disp: 15 tablet, Rfl: 11    risperiDONE (RISPERDAL) 2 MG tablet, Take 1 tablet (2 mg total) by mouth every evening., Disp: 90 tablet, Rfl: 3    risperiDONE (RISPERDAL) 4 MG tablet, Take 1 tablet (4 mg total) by mouth every morning., Disp: 90 tablet, Rfl: 3    secukinumab (COSENTYX PEN) 150 mg/mL PnIj, Inject 300 mg into the skin every 30 days., Disp: 2 mL, Rfl: 2    sulfaSALAzine (AZULFIDINE) 500 MG TbEC, TAKE 3 TABLETS BY MOUTH  TWICE A DAY, Disp: 540 tablet, Rfl: 0    traZODone (DESYREL) 50 MG tablet, Take 1 tablet (50 mg total) by mouth nightly as needed for Insomnia., Disp: 90 tablet, Rfl: 3    varicella-zoster gE-AS01B, PF, (SHINGRIX) 50 mcg/0.5 mL injection, Inject into the muscle., Disp: 0.5 mL, Rfl: 0    venlafaxine (EFFEXOR-XR) 150 MG Cp24, Take 1 capsule (150 mg total) by mouth once daily., Disp: 90 capsule, Rfl: 3  No current facility-administered medications for this visit.     Facility-Administered Medications Ordered in Other Visits:      "phenylephrine HCL 2.5% ophthalmic solution 1 drop, 1 drop, Right Eye, On Call Procedure, Karen Song MD, 1 drop at 08/01/19 0648    proparacaine 0.5 % ophthalmic solution 1 drop, 1 drop, Right Eye, On Call Procedure, Karen Song MD, 1 drop at 08/01/19 0638    tropicamide 1% ophthalmic solution 1 drop, 1 drop, Right Eye, On Call Procedure, Karen Song MD, 1 drop at 08/01/19 0648    ALLERGIES:  Review of patient's allergies indicates:   Allergen Reactions    Etanercept Other (See Comments)     Other reaction(s): recurrent infections    Chloramphenicol sod succinate Hives and Other (See Comments)    Codeine Other (See Comments)     Other reaction(s): Stomach upset    Nickel sutures [surgical stainless steel] Dermatitis     Allergic contact dermatitis    Adhesive Rash     Other reaction(s): Rash       PSYCHIATRIC EXAM:  There were no vitals filed for this visit.  Appearance:  Well groomed, appearing healthy and of stated age  Behavior:  Cooperative, pleasant, no psychomotor agitation or retardation  Speech:  Slow, monotone  Mood:  "Fine"  Affect:  Blunted  Thought Process:  Tangential, bizarre  Thought Content:  +Delusions, paranoia.  Negative for suicidal ideation, homicidal.  Not responding to unseen others during interview.  Associations:  Loose  Memory:  Poor  Level of Consciousness/Orientation:  Grossly intact  Fund of Knowledge:  Fair  Attention:  Good  Language:  Fluent, able to name abstract and concrete objects  Insight:  Poor due to psychosis  Judgment:  Poor due to psychosis  Psychomotor signs:  Involuntary oral buccal movements  Gait:  Unable to assess via video    RELEVANT LABS/STUDIES:    Lab Results   Component Value Date    WBC 6.94 02/03/2020    HGB 12.6 02/03/2020    HCT 38.7 02/03/2020     (H) 02/03/2020     02/03/2020     BMP  Lab Results   Component Value Date     02/03/2020    K 4.7 02/03/2020     02/03/2020    CO2 28 02/03/2020    BUN 5 (L) " 02/03/2020    CREATININE 0.8 02/03/2020    CALCIUM 10.1 02/03/2020    ANIONGAP 7 (L) 02/03/2020    ESTGFRAFRICA >60.0 02/03/2020    EGFRNONAA >60.0 02/03/2020     Lab Results   Component Value Date    ALT 13 02/03/2020    AST 23 02/03/2020    GGT 91 (H) 07/28/2014    ALKPHOS 68 02/03/2020    BILITOT 0.5 02/03/2020     Lab Results   Component Value Date    TSH 2.001 11/15/2019     Lab Results   Component Value Date    HGBA1C 5.3 12/11/2018       IMPRESSION:    Bhavna Landry is a 64 y.o. female with history of Paranoid Schizophrenia, Depression, and Insomnia who presents for follow up appointment.    Status/Progress:  Based on the examination today, the patient's problem(s) is/are adequately but not ideally controlled.  New problems have not been presented today.     Risk Parameters:  Patient reports no suicidal ideation  Patient reports no homicidal ideation  Patient reports no self-injurious behavior  Patient reports no violent behavior    DIAGNOSES:    ICD-10-CM ICD-9-CM   1. Paranoid schizophrenia F20.0 295.30   2. Recurrent major depressive disorder, in full remission F33.42 296.36   3. Insomnia, unspecified type G47.00 780.52   4. Restless legs G25.81 333.94     PLAN:  · Psychosis improved now that patient is taking medication consistently.  Continue Risperdal 4mg/2mg and Cogentin 0.5mg BID.  · Continue Effexor 150mg daily for depression.  · Continue Trazodone 50mg qHS for insomnia.  · Increase Neurontin to 400mg qHS to help with pain and sleep.  · Discussed with patient and daughter informed consent, risks versus benefits, alternative treatments, side effect profile and the inherent unpredictability of individual responses to these treatments.  The patient's daughter expresses understanding of the above and displays the capacity to agree with this current plan.    RETURN TO CLINIC:  Follow up in about 3 months (around 7/29/2020).

## 2020-05-04 ENCOUNTER — PATIENT OUTREACH (OUTPATIENT)
Dept: ADMINISTRATIVE | Facility: OTHER | Age: 65
End: 2020-05-04

## 2020-05-05 ENCOUNTER — OFFICE VISIT (OUTPATIENT)
Dept: PAIN MEDICINE | Facility: CLINIC | Age: 65
End: 2020-05-05
Payer: MEDICARE

## 2020-05-05 DIAGNOSIS — G89.4 CHRONIC PAIN SYNDROME: ICD-10-CM

## 2020-05-05 DIAGNOSIS — G89.29 CHRONIC RIGHT-SIDED LOW BACK PAIN WITHOUT SCIATICA: ICD-10-CM

## 2020-05-05 DIAGNOSIS — M54.50 CHRONIC RIGHT-SIDED LOW BACK PAIN WITHOUT SCIATICA: ICD-10-CM

## 2020-05-05 DIAGNOSIS — M54.50 ACUTE BILATERAL LOW BACK PAIN WITHOUT SCIATICA: ICD-10-CM

## 2020-05-05 DIAGNOSIS — M46.1 BILATERAL SACROILIITIS: Primary | ICD-10-CM

## 2020-05-05 DIAGNOSIS — M53.3 SACROILIAC JOINT DYSFUNCTION OF BOTH SIDES: ICD-10-CM

## 2020-05-05 PROCEDURE — 3288F FALL RISK ASSESSMENT DOCD: CPT | Mod: CPTII,95,, | Performed by: NURSE PRACTITIONER

## 2020-05-05 PROCEDURE — 3288F PR FALLS RISK ASSESSMENT DOCUMENTED: ICD-10-PCS | Mod: CPTII,95,, | Performed by: NURSE PRACTITIONER

## 2020-05-05 PROCEDURE — 1100F PR PT FALLS ASSESS DOC 2+ FALLS/FALL W/INJURY/YR: ICD-10-PCS | Mod: CPTII,95,, | Performed by: NURSE PRACTITIONER

## 2020-05-05 PROCEDURE — 99213 PR OFFICE/OUTPT VISIT, EST, LEVL III, 20-29 MIN: ICD-10-PCS | Mod: 95,,, | Performed by: NURSE PRACTITIONER

## 2020-05-05 PROCEDURE — 1159F PR MEDICATION LIST DOCUMENTED IN MEDICAL RECORD: ICD-10-PCS | Mod: 95,,, | Performed by: NURSE PRACTITIONER

## 2020-05-05 PROCEDURE — 1100F PTFALLS ASSESS-DOCD GE2>/YR: CPT | Mod: CPTII,95,, | Performed by: NURSE PRACTITIONER

## 2020-05-05 PROCEDURE — 1159F MED LIST DOCD IN RCRD: CPT | Mod: 95,,, | Performed by: NURSE PRACTITIONER

## 2020-05-05 PROCEDURE — 99213 OFFICE O/P EST LOW 20 MIN: CPT | Mod: 95,,, | Performed by: NURSE PRACTITIONER

## 2020-05-05 NOTE — H&P (VIEW-ONLY)
Chronic Pain-Tele-Medicine-Established Note (Follow up visit)      The patient location is:  Home  The chief complaint leading to consultation is:  Low Back, left and  right groin,  bilateral hips and anterior thigh pain.   Visit type: Virtual visit with synchronous audio and video  Total time spent with patient: 20  Each patient to whom he or she provides medical services by telemedicine is:  (1) informed of the relationship between the physician and patient and the respective role of any other health care provider with respect to management of the patient; and (2) notified that he or she may decline to receive medical services by telemedicine and may withdraw from such care at any time.    Notes:     SUBJECTIVE:    Bhavna Landry presents tele-medicine appointment for a follow-up appointment for low back, left groin and bilateral hip pain.  Since the last visit, Bhavna Landry states the pain has been persistant. Current pain intensity is 9/10.  Patient reports onset of pain 2 weeks, patient states bending forward and sitting for long periods of time increase her pain.  Pain is described as aching.  She reports no radicular symptoms in either leg.  Worst pain is not out of 10.  Patient is status post right and left L5 Dorsal Ramus and Lateral Branches of S1, S2, and S3 (4 levels) RFA reported 80-90% relief for a minimal 6 months.  Pain is now returned and patient like to reschedule procedure.        Pain Medications:    - Opioids:  Norco 5-325 per her PCP  - NSAIDs:  Diclofenac gel  - Anti-Depressants: venlafaxine (EFFEXOR-XR) 150 MG   - Anti-Convulsants:  Gabapentin 400 mg nightly  - Others:  Not applicable      Physical Therapy/Home Exercise: yes, but has not attended in the last 6 months.     report:  Reviewed and consistent with medication use as prescribed.    Pain Procedures:  Bilateral SI joint injections, bilateral  Dorsal ramus block(DR5, S1,S2,S3),  bilateral L5 Dorsal Ramus and Lateral Branches of  S1, S2, and S3 (4 levels) RFA      Imaging:     X-Ray Hip 2 View Left  EXAMINATION:  XR HIP 2 VIEW LEFT    CLINICAL HISTORY:  Left lower quadrant pain    FINDINGS:  Two views: No fracture, dislocation, or bone destruction seen.  There is mild DJD.    Electronically signed by: John Bazan MD  Date:    2020  Time:    10:40         Past Medical History:   Diagnosis Date    Allergy     Amblyopia     Anemia     Anticoagulant long-term use     Arthritis 1992    Cataract     Depression     Dry eyes     Dry mouth     Duane's syndrome of right eye     Fibromyalgia 2014    Fibromyalgia     GERD (gastroesophageal reflux disease)     History of psychiatric hospitalization     Hyperlipidemia 1992    Hypertension     Kidney stone     Migraine headache     Osteoporosis     Psoriatic arthritis 1992    Right knee pain     post knee replacement surgery (possible rejectiion of metal)    RLS (restless legs syndrome)     Schizophrenia 1992    stable on meds    Urinary tract infection      Past Surgical History:   Procedure Laterality Date    brow ptosis repair Right 2019    Surgeon: Dr. Karla Henson    CATARACT EXTRACTION       SECTION      COLONOSCOPY N/A 2016    Procedure: COLONOSCOPY;  Surgeon: ANDRIA Connelly MD;  Location: [address]);  Service: Endoscopy;  Laterality: N/A;    cyst removed from right sinus  1982    HYSTERECTOMY      VAGINAL HYSTERECTOMY WITHOUT BSO - ENDOMETRIOSIS    INJECTION OF ANESTHETIC AGENT AROUND MEDIAL BRANCH NERVES INNERVATING LUMBAR FACET JOINT N/A 2019    Procedure: Lumbo-sacral Block, DR5 and Lateral Branches of S1,S2, S3;  Surgeon: Michelle Pineda Jr., MD;  Location: [facility];  Service: Pain Management;  Laterality: N/A;  Pt takes  and states she holds ASA on her own whenever she has procedures.  Instructed to hold x 3 days prior to procedure.      INJECTION OF ANESTHETIC AGENT INTO  SACROILIAC JOINT Right 8/30/2018    Procedure: BLOCK, SACROILIAC JOINT-Right- ORAL SEDATION;  Surgeon: Michelle Pineda Jr., MD;  Location: [facility];  Service: Pain Management;  Laterality: Right;    INJECTION OF ANESTHETIC AGENT INTO SACROILIAC JOINT Bilateral 9/27/2018    Procedure: BLOCK, SACROILIAC JOINT-BILATERAL;  Surgeon: Michelle Pineda Jr., MD;  Location: [facility];  Service: Pain Management;  Laterality: Bilateral;  Please keep at 10:00 due to trasnsportation    INJECTION OF ANESTHETIC AGENT INTO SACROILIAC JOINT Bilateral 2/7/2019    Procedure: Bilateral Sacroiliac Joint Injection - Per Dr Pineda, not necessary to hold ASA.;  Surgeon: Michelle Pineda Jr., MD;  Location: [facility];  Service: Pain Management;  Laterality: Bilateral;    INTRAOCULAR PROSTHESES INSERTION Right 8/1/2019    Procedure: INSERTION, IOL PROSTHESIS;  Surgeon: Karen Song MD;  Location: [facility] OR [address] [place] [address];  Service: Ophthalmology;  Laterality: Right;    INTRAOCULAR PROSTHESES INSERTION Left 9/26/2019    Procedure: INSERTION, IOL PROSTHESIS;  Surgeon: Karen Song MD;  Location: [facility] OR [address] [place] [address];  Service: Ophthalmology;  Laterality: Left;    JOINT REPLACEMENT Right     knee    KNEE SURGERY      PHACOEMULSIFICATION OF CATARACT Right 8/1/2019    Procedure: PHACOEMULSIFICATION, CATARACT;  Surgeon: Karen Song MD;  Location: [facility] OR [address] [place] [address];  Service: Ophthalmology;  Laterality: Right;    PHACOEMULSIFICATION OF CATARACT Left 9/26/2019    Procedure: PHACOEMULSIFICATION, CATARACT;  Surgeon: Karen Song MD;  [facility];  Service: Ophthalmology;  Laterality: Left;    RADIOFREQUENCY THERMOCOAGULATION Right 5/7/2019    Procedure: RADIOFREQUENCY THERMAL COAGULATION RIGHT DORSAL RAMUS 5 AND LATERAL BRANCH OF S1, S2 AND S3;  Surgeon: Michelle Pineda Jr., MD;  Location: [facility];  Service: Pain Management;  Laterality: Right;    RADIOFREQUENCY THERMOCOAGULATION Left 5/14/2019     Procedure: RADIOFREQUENCY THERMAL COAGULATION LEFT DORSAL RAMUS 5 AND LATERAL BRANCH OF S1,S2 AND S3;  Surgeon: Michelle Pineda Jr., MD;  [place] [facility]  Service: Pain Management;  Laterality: Left;    RADIOFREQUENCY THERMOCOAGULATION Right 10/22/2019    Procedure: RADIOFREQUENCY THERMAL COAGULATION---DARSAL RAMUS 5 and LATERAL S1,S2,and S3 Right;  Surgeon: Michelle Pineda Jr., MD;  Location: [facility];  Service: Pain Management;  Laterality: Right;  patient to sign consent DOS    RADIOFREQUENCY THERMOCOAGULATION Left 10/29/2019    Procedure: RADIOFREQUENCY THERMAL COAGULATION - LEFT - DR5, S1,S2, AND S3;  Surgeon: Michelle Pineda Jr., MD;  Location: [facility];  Service: Pain Management;  Laterality: Left;    Surgery on right knee  1982    tumor removed from back left side upper shoulder  2006     Social History     Socioeconomic History    Marital status:      Spouse name: Not on file    Number of children: 1    Years of education: Not on file    Highest education level: Not on file   Occupational History    Occupation: retired asst  [employer].     Employer: 2010   Social Needs    Financial resource strain: Not on file    Food insecurity:     Worry: Not on file     Inability: Not on file    Transportation needs:     Medical: Not on file     Non-medical: Not on file   Tobacco Use    Smoking status: Former Smoker     Packs/day: 0.50     Years: 10.00     Pack years: 5.00     Types: Cigarettes     Last attempt to quit: 2012     Years since quittin.9    Smokeless tobacco: Former User    Tobacco comment: stopped smoking .     Substance and Sexual Activity    Alcohol use: No    Drug use: No    Sexual activity: Never     Partners: Male     Birth control/protection: Post-menopausal   Lifestyle    Physical activity:     Days per week: Not on file     Minutes per session: Not on file    Stress: Not on file   Relationships     Social connections:     Talks on phone: Not on file     Gets together: Not on file     Attends [employer]ism service: Not on file     Active member of club or organization: Not on file     Attends meetings of clubs or organizations: Not on file     Relationship status: Not on file   Other Topics Concern    Patient feels they ought to cut down on drinking/drug use Not Asked    Patient annoyed by others criticizing their drinking/drug use Not Asked    Patient has felt bad or guilty about drinking/drug use Not Asked    Patient has had a drink/used drugs as an eye opener in the AM Not Asked    Are you pregnant or think you may be? Not Asked    Breast-feeding Not Asked   Social History Narrative    Exercise:  Childcare.        Ett:  3 days a week        Daughter and her 3 kids live with her.         Family History   Problem Relation Age of Onset    Colon cancer Mother     Psoriasis Mother     Cancer Mother         colon    Depression Mother     Diabetes Brother     Arthritis Brother     Heart disease Brother         cad    Cancer Father         lymphoma    Stroke Sister     No Known Problems Daughter     Hypertension Neg Hx     Suicide Neg Hx     Amblyopia Neg Hx     Blindness Neg Hx     Cataracts Neg Hx     Glaucoma Neg Hx     Macular degeneration Neg Hx     Retinal detachment Neg Hx     Strabismus Neg Hx        Review of patient's allergies indicates:   Allergen Reactions    Etanercept Other (See Comments)     Other reaction(s): recurrent infections    Chloramphenicol sod succinate Hives and Other (See Comments)    Codeine Other (See Comments)     Other reaction(s): Stomach upset    Nickel sutures [surgical stainless steel] Dermatitis     Allergic contact dermatitis    Adhesive Rash     Other reaction(s): Rash       Current Outpatient Medications   Medication Sig    albuterol (ACCUNEB) 1.25 mg/3 mL Nebu TAKE 3 ML BY NEBULIZATION EVERY 6 HOURS AS NEEDED    albuterol (PROAIR HFA) 90  mcg/actuation inhaler 2 puffs qid prn    amLODIPine (NORVASC) 5 MG tablet TAKE 1 TABLET BY MOUTH EVERY DAY    amoxicillin-clavulanate 875-125mg (AUGMENTIN) 875-125 mg per tablet Take 1 tablet by mouth 2 (two) times daily.    atorvastatin (LIPITOR) 80 MG tablet Take 1 tablet (80 mg total) by mouth once daily.    baclofen (LIORESAL) 20 MG tablet Take 1 tablet (20 mg total) by mouth 3 (three) times daily.    benzonatate (TESSALON PERLES) 100 MG capsule Take 1 capsule (100 mg total) by mouth every 6 (six) hours as needed for Cough.    benztropine (COGENTIN) 0.5 MG tablet Take 1 tablet (0.5 mg total) by mouth 2 (two) times daily.    diclofenac sodium (VOLTAREN) 1 % Gel Apply 2 g topically 4 (four) times daily as needed.    gabapentin (NEURONTIN) 400 MG capsule Take 1 capsule (400 mg total) by mouth every evening.    hydroCHLOROthiazide (HYDRODIURIL) 25 MG tablet TAKE 1 TABLET(25 MG) BY MOUTH EVERY DAY    HYDROcodone-acetaminophen (NORCO) 5-325 mg per tablet Take 1 tablet by mouth every 8 (eight) hours as needed for Pain.    lisinopril 10 MG tablet Take 10 mg by mouth daily as needed.     metoprolol succinate (TOPROL-XL) 25 MG 24 hr tablet Take 0.5 tablets (12.5 mg total) by mouth once daily.    risperiDONE (RISPERDAL) 2 MG tablet Take 1 tablet (2 mg total) by mouth every evening.    risperiDONE (RISPERDAL) 4 MG tablet Take 1 tablet (4 mg total) by mouth every morning.    secukinumab (COSENTYX PEN) 150 mg/mL PnIj Inject 300 mg into the skin every 30 days.    sulfaSALAzine (AZULFIDINE) 500 MG TbEC TAKE 3 TABLETS BY MOUTH  TWICE A DAY    traZODone (DESYREL) 50 MG tablet Take 1 tablet (50 mg total) by mouth nightly as needed for Insomnia.    varicella-zoster gE-AS01B, PF, (SHINGRIX) 50 mcg/0.5 mL injection Inject into the muscle.    venlafaxine (EFFEXOR-XR) 150 MG Cp24 Take 1 capsule (150 mg total) by mouth once daily.     No current facility-administered medications for this visit.       Facility-Administered Medications Ordered in Other Visits   Medication    phenylephrine HCL 2.5% ophthalmic solution 1 drop    proparacaine 0.5 % ophthalmic solution 1 drop    tropicamide 1% ophthalmic solution 1 drop       REVIEW OF SYSTEMS:    GENERAL:  No weight loss, malaise or fevers.  HEENT:   No recent changes in vision or hearing  NECK:  Negative for lumps, no difficulty with swallowing.  RESPIRATORY:  Negative for cough, wheezing or shortness of breath, patient denies any recent URI.  CARDIOVASCULAR:  Negative for chest pain, leg swelling or palpitations.  GI:  Negative for abdominal discomfort, blood in stools or black stools or change in bowel habits.  MUSCULOSKELETAL:  See HPI.  SKIN:  Negative for lesions, rash, and itching.  PSYCH:  No mood disorder or recent psychosocial stressors.  Patients sleep is not disturbed secondary to pain.  HEMATOLOGY/LYMPHOLOGY:  Negative for prolonged bleeding, bruising easily or swollen nodes.  Patient is not currently taking any anti-coagulants  NEURO:   No history of headaches, syncope, paralysis, seizures or tremors.  All other reviewed and negative other than HPI.    OBJECTIVE:    Physical Exam  There were no vitals filed for this visit.  Ortho/SPM Exam      ASSESSMENT:  64-year-old female with chronic low back, bilateral hip and left groin pain.  The pain is nonradiating and exacerbated by prolonged sitting, standing and rolling over in bed.  Patient states that this pain limits her ability to accomplish some of her activities of daily living, Patient's description of pain is consistent with bilateral sacroiliac joint dysfunction.  The patient has benefited from a left then right DR 5+ lateral branches of the S1, S2 and S3 RFA she states that this procedure provided her with 80-90% relief for 6 months.  Recommended repeating a left then right DR 5, lateral branch RFA patient agreed and understood.    1. Bilateral sacroiliitis     2. Sacroiliac joint  dysfunction of both sides     3. Chronic pain syndrome     4. Chronic right-sided low back pain without sciatica     5. Acute bilateral low back pain without sciatica           PLAN:     - I have stressed the importance of physical activity and a home exercise plan to help with pain and improve health.  - Referral to Physical therapy for Lumbar stabilization, core strengthening, and a home exercise program.  - Patient can continue with medications for now since they are providing benefits, using them appropriately, and without side effects.  - Counseled patient regarding the importance of activity modification, constant sleeping habits and physical therapy.  - recommend scheduling for a left then right  DR 5+ lateral branches of the S1, S2 and S3 RFA  The above plan and management options were discussed at length with patient. Patient is in agreement with the above and verbalized understanding. It will be communicated with the referring physician via electronic record, fax, or mail.  The above plan and management options were discussed at length with patient. Patient is in agreement with the above and verbalized understanding. Dr. Pineda was consulted on this patient  and agrees with this plan.      LUI Brown  Interventional Pain Management      05/05/2020     Disclaimer: This note was partly generated using dictation software which may occasionally result in transcription errors.

## 2020-05-05 NOTE — PROGRESS NOTES
Chronic Pain-Tele-Medicine-Established Note (Follow up visit)      The patient location is:  Home  The chief complaint leading to consultation is:  Low Back, left and  right groin,  bilateral hips and anterior thigh pain.   Visit type: Virtual visit with synchronous audio and video  Total time spent with patient: 20  Each patient to whom he or she provides medical services by telemedicine is:  (1) informed of the relationship between the physician and patient and the respective role of any other health care provider with respect to management of the patient; and (2) notified that he or she may decline to receive medical services by telemedicine and may withdraw from such care at any time.    Notes:     SUBJECTIVE:    Bhavna Landry presents tele-medicine appointment for a follow-up appointment for low back, left groin and bilateral hip pain.  Since the last visit, Bhavna Landry states the pain has been persistant. Current pain intensity is 9/10.  Patient reports onset of pain 2 weeks, patient states bending forward and sitting for long periods of time increase her pain.  Pain is described as aching.  She reports no radicular symptoms in either leg.  Worst pain is not out of 10.  Patient is status post right and left L5 Dorsal Ramus and Lateral Branches of S1, S2, and S3 (4 levels) RFA reported 80-90% relief for a minimal 6 months.  Pain is now returned and patient like to reschedule procedure.        Pain Medications:    - Opioids:  Norco 5-325 per her PCP  - NSAIDs:  Diclofenac gel  - Anti-Depressants: venlafaxine (EFFEXOR-XR) 150 MG   - Anti-Convulsants:  Gabapentin 400 mg nightly  - Others:  Not applicable      Physical Therapy/Home Exercise: yes, but has not attended in the last 6 months.     report:  Reviewed and consistent with medication use as prescribed.    Pain Procedures:  Bilateral SI joint injections, bilateral  Dorsal ramus block(DR5, S1,S2,S3),  bilateral L5 Dorsal Ramus and Lateral Branches of  S1, S2, and S3 (4 levels) RFA      Imaging:     X-Ray Hip 2 View Left  EXAMINATION:  XR HIP 2 VIEW LEFT    CLINICAL HISTORY:  Left lower quadrant pain    FINDINGS:  Two views: No fracture, dislocation, or bone destruction seen.  There is mild DJD.    Electronically signed by: John Bazan MD  Date:    2020  Time:    10:40         Past Medical History:   Diagnosis Date    Allergy     Amblyopia     Anemia     Anticoagulant long-term use     Arthritis 1992    Cataract     Depression     Dry eyes     Dry mouth     Duane's syndrome of right eye     Fibromyalgia 2014    Fibromyalgia     GERD (gastroesophageal reflux disease)     History of psychiatric hospitalization     Hyperlipidemia 1992    Hypertension     Kidney stone     Migraine headache     Osteoporosis     Psoriatic arthritis 1992    Right knee pain     post knee replacement surgery (possible rejectiion of metal)    RLS (restless legs syndrome)     Schizophrenia 1992    stable on meds    Urinary tract infection      Past Surgical History:   Procedure Laterality Date    brow ptosis repair Right 2019    Surgeon: Dr. Karla Henson    CATARACT EXTRACTION       SECTION      COLONOSCOPY N/A 2016    Procedure: COLONOSCOPY;  Surgeon: ANDRIA Connelly MD;  Location: [address]);  Service: Endoscopy;  Laterality: N/A;    cyst removed from right sinus  1982    HYSTERECTOMY      VAGINAL HYSTERECTOMY WITHOUT BSO - ENDOMETRIOSIS    INJECTION OF ANESTHETIC AGENT AROUND MEDIAL BRANCH NERVES INNERVATING LUMBAR FACET JOINT N/A 2019    Procedure: Lumbo-sacral Block, DR5 and Lateral Branches of S1,S2, S3;  Surgeon: Michelle Pineda Jr., MD;  Location: [facility];  Service: Pain Management;  Laterality: N/A;  Pt takes  and states she holds ASA on her own whenever she has procedures.  Instructed to hold x 3 days prior to procedure.      INJECTION OF ANESTHETIC AGENT INTO  SACROILIAC JOINT Right 8/30/2018    Procedure: BLOCK, SACROILIAC JOINT-Right- ORAL SEDATION;  Surgeon: Michelle Pineda Jr., MD;  Location: [facility];  Service: Pain Management;  Laterality: Right;    INJECTION OF ANESTHETIC AGENT INTO SACROILIAC JOINT Bilateral 9/27/2018    Procedure: BLOCK, SACROILIAC JOINT-BILATERAL;  Surgeon: Michelle Pineda Jr., MD;  Location: [facility];  Service: Pain Management;  Laterality: Bilateral;  Please keep at 10:00 due to trasnsportation    INJECTION OF ANESTHETIC AGENT INTO SACROILIAC JOINT Bilateral 2/7/2019    Procedure: Bilateral Sacroiliac Joint Injection - Per Dr Pineda, not necessary to hold ASA.;  Surgeon: Michelle Pineda Jr., MD;  Location: [facility];  Service: Pain Management;  Laterality: Bilateral;    INTRAOCULAR PROSTHESES INSERTION Right 8/1/2019    Procedure: INSERTION, IOL PROSTHESIS;  Surgeon: Karen Song MD;  Location: [facility] OR [address] [place] [address];  Service: Ophthalmology;  Laterality: Right;    INTRAOCULAR PROSTHESES INSERTION Left 9/26/2019    Procedure: INSERTION, IOL PROSTHESIS;  Surgeon: Karen Song MD;  Location: [facility] OR [address] [place] [address];  Service: Ophthalmology;  Laterality: Left;    JOINT REPLACEMENT Right     knee    KNEE SURGERY      PHACOEMULSIFICATION OF CATARACT Right 8/1/2019    Procedure: PHACOEMULSIFICATION, CATARACT;  Surgeon: Karen Song MD;  Location: [facility] OR [address] [place] [address];  Service: Ophthalmology;  Laterality: Right;    PHACOEMULSIFICATION OF CATARACT Left 9/26/2019    Procedure: PHACOEMULSIFICATION, CATARACT;  Surgeon: Karen Song MD;  [facility];  Service: Ophthalmology;  Laterality: Left;    RADIOFREQUENCY THERMOCOAGULATION Right 5/7/2019    Procedure: RADIOFREQUENCY THERMAL COAGULATION RIGHT DORSAL RAMUS 5 AND LATERAL BRANCH OF S1, S2 AND S3;  Surgeon: Michelle Pineda Jr., MD;  Location: [facility];  Service: Pain Management;  Laterality: Right;    RADIOFREQUENCY THERMOCOAGULATION Left 5/14/2019     Procedure: RADIOFREQUENCY THERMAL COAGULATION LEFT DORSAL RAMUS 5 AND LATERAL BRANCH OF S1,S2 AND S3;  Surgeon: Michelle Pineda Jr., MD;  Location: [facility];  Service: Pain Management;  Laterality: Left;    RADIOFREQUENCY THERMOCOAGULATION Right 10/22/2019    Procedure: RADIOFREQUENCY THERMAL COAGULATION---DARSAL RAMUS 5 and LATERAL S1,S2,and S3 Right;  Surgeon: Michelle Pineda Jr., MD;  Location: [facility];  Service: Pain Management;  Laterality: Right;  patient to sign consent DOS    RADIOFREQUENCY THERMOCOAGULATION Left 10/29/2019    Procedure: RADIOFREQUENCY THERMAL COAGULATION - LEFT - DR5, S1,S2, AND S3;  Surgeon: Michelle Pineda Jr., MD;  Location: [facility];  Service: Pain Management;  Laterality: Left;    Surgery on right knee  1982    tumor removed from back left side upper shoulder  2006     Social History     Socioeconomic History    Marital status:      Spouse name: Not on file    Number of children: 1    Years of education: Not on file    Highest education level: Not on file   Occupational History    Occupation: retired asst  [address].     Employer: 2010   Social Needs    Financial resource strain: Not on file    Food insecurity:     Worry: Not on file     Inability: Not on file    Transportation needs:     Medical: Not on file     Non-medical: Not on file   Tobacco Use    Smoking status: Former Smoker     Packs/day: 0.50     Years: 10.00     Pack years: 5.00     Types: Cigarettes     Last attempt to quit: 2012     Years since quittin.9    Smokeless tobacco: Former User    Tobacco comment: stopped smoking .     Substance and Sexual Activity    Alcohol use: No    Drug use: No    Sexual activity: Never     Partners: Male     Birth control/protection: Post-menopausal   Lifestyle    Physical activity:     Days per week: Not on file     Minutes per session: Not on file    Stress: Not on file   Relationships     Social connections:     Talks on phone: Not on file     Gets together: Not on file     Attends Gnosticist service: Not on file     Active member of club or organization: Not on file     Attends meetings of clubs or organizations: Not on file     Relationship status: Not on file   Other Topics Concern    Patient feels they ought to cut down on drinking/drug use Not Asked    Patient annoyed by others criticizing their drinking/drug use Not Asked    Patient has felt bad or guilty about drinking/drug use Not Asked    Patient has had a drink/used drugs as an eye opener in the AM Not Asked    Are you pregnant or think you may be? Not Asked    Breast-feeding Not Asked   Social History Narrative    Exercise:  Childcare.        Ett:  3 days a week        Daughter and her 3 kids live with her.         Family History   Problem Relation Age of Onset    Colon cancer Mother     Psoriasis Mother     Cancer Mother         colon    Depression Mother     Diabetes Brother     Arthritis Brother     Heart disease Brother         cad    Cancer Father         lymphoma    Stroke Sister     No Known Problems Daughter     Hypertension Neg Hx     Suicide Neg Hx     Amblyopia Neg Hx     Blindness Neg Hx     Cataracts Neg Hx     Glaucoma Neg Hx     Macular degeneration Neg Hx     Retinal detachment Neg Hx     Strabismus Neg Hx        Review of patient's allergies indicates:   Allergen Reactions    Etanercept Other (See Comments)     Other reaction(s): recurrent infections    Chloramphenicol sod succinate Hives and Other (See Comments)    Codeine Other (See Comments)     Other reaction(s): Stomach upset    Nickel sutures [surgical stainless steel] Dermatitis     Allergic contact dermatitis    Adhesive Rash     Other reaction(s): Rash       Current Outpatient Medications   Medication Sig    albuterol (ACCUNEB) 1.25 mg/3 mL Nebu TAKE 3 ML BY NEBULIZATION EVERY 6 HOURS AS NEEDED    albuterol (PROAIR HFA) 90  mcg/actuation inhaler 2 puffs qid prn    amLODIPine (NORVASC) 5 MG tablet TAKE 1 TABLET BY MOUTH EVERY DAY    amoxicillin-clavulanate 875-125mg (AUGMENTIN) 875-125 mg per tablet Take 1 tablet by mouth 2 (two) times daily.    atorvastatin (LIPITOR) 80 MG tablet Take 1 tablet (80 mg total) by mouth once daily.    baclofen (LIORESAL) 20 MG tablet Take 1 tablet (20 mg total) by mouth 3 (three) times daily.    benzonatate (TESSALON PERLES) 100 MG capsule Take 1 capsule (100 mg total) by mouth every 6 (six) hours as needed for Cough.    benztropine (COGENTIN) 0.5 MG tablet Take 1 tablet (0.5 mg total) by mouth 2 (two) times daily.    diclofenac sodium (VOLTAREN) 1 % Gel Apply 2 g topically 4 (four) times daily as needed.    gabapentin (NEURONTIN) 400 MG capsule Take 1 capsule (400 mg total) by mouth every evening.    hydroCHLOROthiazide (HYDRODIURIL) 25 MG tablet TAKE 1 TABLET(25 MG) BY MOUTH EVERY DAY    HYDROcodone-acetaminophen (NORCO) 5-325 mg per tablet Take 1 tablet by mouth every 8 (eight) hours as needed for Pain.    lisinopril 10 MG tablet Take 10 mg by mouth daily as needed.     metoprolol succinate (TOPROL-XL) 25 MG 24 hr tablet Take 0.5 tablets (12.5 mg total) by mouth once daily.    risperiDONE (RISPERDAL) 2 MG tablet Take 1 tablet (2 mg total) by mouth every evening.    risperiDONE (RISPERDAL) 4 MG tablet Take 1 tablet (4 mg total) by mouth every morning.    secukinumab (COSENTYX PEN) 150 mg/mL PnIj Inject 300 mg into the skin every 30 days.    sulfaSALAzine (AZULFIDINE) 500 MG TbEC TAKE 3 TABLETS BY MOUTH  TWICE A DAY    traZODone (DESYREL) 50 MG tablet Take 1 tablet (50 mg total) by mouth nightly as needed for Insomnia.    varicella-zoster gE-AS01B, PF, (SHINGRIX) 50 mcg/0.5 mL injection Inject into the muscle.    venlafaxine (EFFEXOR-XR) 150 MG Cp24 Take 1 capsule (150 mg total) by mouth once daily.     No current facility-administered medications for this visit.       Facility-Administered Medications Ordered in Other Visits   Medication    phenylephrine HCL 2.5% ophthalmic solution 1 drop    proparacaine 0.5 % ophthalmic solution 1 drop    tropicamide 1% ophthalmic solution 1 drop       REVIEW OF SYSTEMS:    GENERAL:  No weight loss, malaise or fevers.  HEENT:   No recent changes in vision or hearing  NECK:  Negative for lumps, no difficulty with swallowing.  RESPIRATORY:  Negative for cough, wheezing or shortness of breath, patient denies any recent URI.  CARDIOVASCULAR:  Negative for chest pain, leg swelling or palpitations.  GI:  Negative for abdominal discomfort, blood in stools or black stools or change in bowel habits.  MUSCULOSKELETAL:  See HPI.  SKIN:  Negative for lesions, rash, and itching.  PSYCH:  No mood disorder or recent psychosocial stressors.  Patients sleep is not disturbed secondary to pain.  HEMATOLOGY/LYMPHOLOGY:  Negative for prolonged bleeding, bruising easily or swollen nodes.  Patient is not currently taking any anti-coagulants  NEURO:   No history of headaches, syncope, paralysis, seizures or tremors.  All other reviewed and negative other than HPI.    OBJECTIVE:    Physical Exam  There were no vitals filed for this visit.  Ortho/SPM Exam      ASSESSMENT:  64-year-old female with chronic low back, bilateral hip and left groin pain.  The pain is nonradiating and exacerbated by prolonged sitting, standing and rolling over in bed.  Patient states that this pain limits her ability to accomplish some of her activities of daily living, Patient's description of pain is consistent with bilateral sacroiliac joint dysfunction.  The patient has benefited from a left then right DR 5+ lateral branches of the S1, S2 and S3 RFA she states that this procedure provided her with 80-90% relief for 6 months.  Recommended repeating a left then right DR 5, lateral branch RFA patient agreed and understood.    1. Bilateral sacroiliitis     2. Sacroiliac joint  dysfunction of both sides     3. Chronic pain syndrome     4. Chronic right-sided low back pain without sciatica     5. Acute bilateral low back pain without sciatica           PLAN:     - I have stressed the importance of physical activity and a home exercise plan to help with pain and improve health.  - Referral to Physical therapy for Lumbar stabilization, core strengthening, and a home exercise program.  - Patient can continue with medications for now since they are providing benefits, using them appropriately, and without side effects.  - Counseled patient regarding the importance of activity modification, constant sleeping habits and physical therapy.  - recommend scheduling for a left then right  DR 5+ lateral branches of the S1, S2 and S3 RFA  The above plan and management options were discussed at length with patient. Patient is in agreement with the above and verbalized understanding. It will be communicated with the referring physician via electronic record, fax, or mail.  The above plan and management options were discussed at length with patient. Patient is in agreement with the above and verbalized understanding. Dr. Pineda was consulted on this patient  and agrees with this plan.      LUI Brown  Interventional Pain Management      05/05/2020     Disclaimer: This note was partly generated using dictation software which may occasionally result in transcription errors.

## 2020-05-05 NOTE — H&P (VIEW-ONLY)
Chronic Pain-Tele-Medicine-Established Note (Follow up visit)      The patient location is:  Home  The chief complaint leading to consultation is:  Low Back, left and  right groin,  bilateral hips and anterior thigh pain.   Visit type: Virtual visit with synchronous audio and video  Total time spent with patient: 20  Each patient to whom he or she provides medical services by telemedicine is:  (1) informed of the relationship between the physician and patient and the respective role of any other health care provider with respect to management of the patient; and (2) notified that he or she may decline to receive medical services by telemedicine and may withdraw from such care at any time.    Notes:     SUBJECTIVE:    Bhavna Landry presents tele-medicine appointment for a follow-up appointment for low back, left groin and bilateral hip pain.  Since the last visit, Bhavna Landry states the pain has been persistant. Current pain intensity is 9/10.  Patient reports onset of pain 2 weeks, patient states bending forward and sitting for long periods of time increase her pain.  Pain is described as aching.  She reports no radicular symptoms in either leg.  Worst pain is not out of 10.  Patient is status post right and left L5 Dorsal Ramus and Lateral Branches of S1, S2, and S3 (4 levels) RFA reported 80-90% relief for a minimal 6 months.  Pain is now returned and patient like to reschedule procedure.        Pain Medications:    - Opioids:  Norco 5-325 per her PCP  - NSAIDs:  Diclofenac gel  - Anti-Depressants: venlafaxine (EFFEXOR-XR) 150 MG   - Anti-Convulsants:  Gabapentin 400 mg nightly  - Others:  Not applicable      Physical Therapy/Home Exercise: yes, but has not attended in the last 6 months.     report:  Reviewed and consistent with medication use as prescribed.    Pain Procedures:  Bilateral SI joint injections, bilateral  Dorsal ramus block(DR5, S1,S2,S3),  bilateral L5 Dorsal Ramus and Lateral Branches of  S1, S2, and S3 (4 levels) RFA      Imaging:     X-Ray Hip 2 View Left  EXAMINATION:  XR HIP 2 VIEW LEFT    CLINICAL HISTORY:  Left lower quadrant pain    FINDINGS:  Two views: No fracture, dislocation, or bone destruction seen.  There is mild DJD.    Electronically signed by: John Bazan MD  Date:    2020  Time:    10:40         Past Medical History:   Diagnosis Date    Allergy     Amblyopia     Anemia     Anticoagulant long-term use     Arthritis 1992    Cataract     Depression     Dry eyes     Dry mouth     Duane's syndrome of right eye     Fibromyalgia 2014    Fibromyalgia     GERD (gastroesophageal reflux disease)     History of psychiatric hospitalization     Hyperlipidemia 1992    Hypertension     Kidney stone     Migraine headache     Osteoporosis     Psoriatic arthritis 1992    Right knee pain     post knee replacement surgery (possible rejectiion of metal)    RLS (restless legs syndrome)     Schizophrenia 1992    stable on meds    Urinary tract infection      Past Surgical History:   Procedure Laterality Date    brow ptosis repair Right 2019    Surgeon: Dr. Karla Henson    CATARACT EXTRACTION       SECTION      COLONOSCOPY N/A 2016    Procedure: COLONOSCOPY;  Surgeon: ANDRIA Connelly MD;  Location: [address]);  Service: Endoscopy;  Laterality: N/A;    cyst removed from right sinus  1982    HYSTERECTOMY      VAGINAL HYSTERECTOMY WITHOUT BSO - ENDOMETRIOSIS    INJECTION OF ANESTHETIC AGENT AROUND MEDIAL BRANCH NERVES INNERVATING LUMBAR FACET JOINT N/A 2019    Procedure: Lumbo-sacral Block, DR5 and Lateral Branches of S1,S2, S3;  Surgeon: Michelle Pineda Jr., MD;  Location: [facility];  Service: Pain Management;  Laterality: N/A;  Pt takes  and states she holds ASA on her own whenever she has procedures.  Instructed to hold x 3 days prior to procedure.      INJECTION OF ANESTHETIC AGENT INTO  SACROILIAC JOINT Right 8/30/2018    Procedure: BLOCK, SACROILIAC JOINT-Right- ORAL SEDATION;  Surgeon: Michelle Pineda Jr., MD;  Location: [facility];  Service: Pain Management;  Laterality: Right;    INJECTION OF ANESTHETIC AGENT INTO SACROILIAC JOINT Bilateral 9/27/2018    Procedure: BLOCK, SACROILIAC JOINT-BILATERAL;  Surgeon: Michelle Pineda Jr., MD;  Location: [facility];  Service: Pain Management;  Laterality: Bilateral;  Please keep at 10:00 due to trasnsportation    INJECTION OF ANESTHETIC AGENT INTO SACROILIAC JOINT Bilateral 2/7/2019    Procedure: Bilateral Sacroiliac Joint Injection - Per Dr Pineda, not necessary to hold ASA.;  Surgeon: Michelle Pineda Jr., MD;  Location: [facility];  Service: Pain Management;  Laterality: Bilateral;    INTRAOCULAR PROSTHESES INSERTION Right 8/1/2019    Procedure: INSERTION, IOL PROSTHESIS;  Surgeon: Karen Song MD;  Location: [facility] OR [address] [place] [address];  Service: Ophthalmology;  Laterality: Right;    INTRAOCULAR PROSTHESES INSERTION Left 9/26/2019    Procedure: INSERTION, IOL PROSTHESIS;  Surgeon: Karen Song MD;  Location: [facility] OR [address] [place] [address];  Service: Ophthalmology;  Laterality: Left;    JOINT REPLACEMENT Right     knee    KNEE SURGERY      PHACOEMULSIFICATION OF CATARACT Right 8/1/2019    Procedure: PHACOEMULSIFICATION, CATARACT;  Surgeon: Karen Song MD;  Location: [facility] OR [address] [place] [address];  Service: Ophthalmology;  Laterality: Right;    PHACOEMULSIFICATION OF CATARACT Left 9/26/2019    Procedure: PHACOEMULSIFICATION, CATARACT;  Surgeon: Karen Song MD;  [facility];  Service: Ophthalmology;  Laterality: Left;    RADIOFREQUENCY THERMOCOAGULATION Right 5/7/2019    Procedure: RADIOFREQUENCY THERMAL COAGULATION RIGHT DORSAL RAMUS 5 AND LATERAL BRANCH OF S1, S2 AND S3;  Surgeon: Michelle Pineda Jr., MD;  Location: [facility];  Service: Pain Management;  Laterality: Right;    RADIOFREQUENCY THERMOCOAGULATION Left 5/14/2019     Procedure: RADIOFREQUENCY THERMAL COAGULATION LEFT DORSAL RAMUS 5 AND LATERAL BRANCH OF S1,S2 AND S3;  Surgeon: Michelle Pineda Jr., MD;  [facility];  Service: Pain Management;  Laterality: Left;    RADIOFREQUENCY THERMOCOAGULATION Right 10/22/2019    Procedure: RADIOFREQUENCY THERMAL COAGULATION---DARSAL RAMUS 5 and LATERAL S1,S2,and S3 Right;  Surgeon: Michelle Pineda Jr., MD;  Location: [facility];  Service: Pain Management;  Laterality: Right;  patient to sign consent DOS    RADIOFREQUENCY THERMOCOAGULATION Left 10/29/2019    Procedure: RADIOFREQUENCY THERMAL COAGULATION - LEFT - DR5, S1,S2, AND S3;  Surgeon: Michelle Pineda Jr., MD;  Location: [facility];  Service: Pain Management;  Laterality: Left;    Surgery on right knee  1982    tumor removed from back left side upper shoulder  2006     Social History     Socioeconomic History    Marital status:      Spouse name: Not on file    Number of children: 1    Years of education: Not on file    Highest education level: Not on file   Occupational History    Occupation: retired asst  [employer] [address].     Employer: 2010   Social Needs    Financial resource strain: Not on file    Food insecurity:     Worry: Not on file     Inability: Not on file    Transportation needs:     Medical: Not on file     Non-medical: Not on file   Tobacco Use    Smoking status: Former Smoker     Packs/day: 0.50     Years: 10.00     Pack years: 5.00     Types: Cigarettes     Last attempt to quit: 2012     Years since quittin.9    Smokeless tobacco: Former User    Tobacco comment: stopped smoking .     Substance and Sexual Activity    Alcohol use: No    Drug use: No    Sexual activity: Never     Partners: Male     Birth control/protection: Post-menopausal   Lifestyle    Physical activity:     Days per week: Not on file     Minutes per session: Not on file    Stress: Not on file   Relationships     Social connections:     Talks on phone: Not on file     Gets together: Not on file     Attends Orthodoxy service: Not on file     Active member of club or organization: Not on file     Attends meetings of clubs or organizations: Not on file     Relationship status: Not on file   Other Topics Concern    Patient feels they ought to cut down on drinking/drug use Not Asked    Patient annoyed by others criticizing their drinking/drug use Not Asked    Patient has felt bad or guilty about drinking/drug use Not Asked    Patient has had a drink/used drugs as an eye opener in the AM Not Asked    Are you pregnant or think you may be? Not Asked    Breast-feeding Not Asked   Social History Narrative    Exercise:  Childcare.        Ett:  3 days a week        Daughter and her 3 kids live with her.         Family History   Problem Relation Age of Onset    Colon cancer Mother     Psoriasis Mother     Cancer Mother         colon    Depression Mother     Diabetes Brother     Arthritis Brother     Heart disease Brother         cad    Cancer Father         lymphoma    Stroke Sister     No Known Problems Daughter     Hypertension Neg Hx     Suicide Neg Hx     Amblyopia Neg Hx     Blindness Neg Hx     Cataracts Neg Hx     Glaucoma Neg Hx     Macular degeneration Neg Hx     Retinal detachment Neg Hx     Strabismus Neg Hx        Review of patient's allergies indicates:   Allergen Reactions    Etanercept Other (See Comments)     Other reaction(s): recurrent infections    Chloramphenicol sod succinate Hives and Other (See Comments)    Codeine Other (See Comments)     Other reaction(s): Stomach upset    Nickel sutures [surgical stainless steel] Dermatitis     Allergic contact dermatitis    Adhesive Rash     Other reaction(s): Rash       Current Outpatient Medications   Medication Sig    albuterol (ACCUNEB) 1.25 mg/3 mL Nebu TAKE 3 ML BY NEBULIZATION EVERY 6 HOURS AS NEEDED    albuterol (PROAIR HFA) 90  mcg/actuation inhaler 2 puffs qid prn    amLODIPine (NORVASC) 5 MG tablet TAKE 1 TABLET BY MOUTH EVERY DAY    amoxicillin-clavulanate 875-125mg (AUGMENTIN) 875-125 mg per tablet Take 1 tablet by mouth 2 (two) times daily.    atorvastatin (LIPITOR) 80 MG tablet Take 1 tablet (80 mg total) by mouth once daily.    baclofen (LIORESAL) 20 MG tablet Take 1 tablet (20 mg total) by mouth 3 (three) times daily.    benzonatate (TESSALON PERLES) 100 MG capsule Take 1 capsule (100 mg total) by mouth every 6 (six) hours as needed for Cough.    benztropine (COGENTIN) 0.5 MG tablet Take 1 tablet (0.5 mg total) by mouth 2 (two) times daily.    diclofenac sodium (VOLTAREN) 1 % Gel Apply 2 g topically 4 (four) times daily as needed.    gabapentin (NEURONTIN) 400 MG capsule Take 1 capsule (400 mg total) by mouth every evening.    hydroCHLOROthiazide (HYDRODIURIL) 25 MG tablet TAKE 1 TABLET(25 MG) BY MOUTH EVERY DAY    HYDROcodone-acetaminophen (NORCO) 5-325 mg per tablet Take 1 tablet by mouth every 8 (eight) hours as needed for Pain.    lisinopril 10 MG tablet Take 10 mg by mouth daily as needed.     metoprolol succinate (TOPROL-XL) 25 MG 24 hr tablet Take 0.5 tablets (12.5 mg total) by mouth once daily.    risperiDONE (RISPERDAL) 2 MG tablet Take 1 tablet (2 mg total) by mouth every evening.    risperiDONE (RISPERDAL) 4 MG tablet Take 1 tablet (4 mg total) by mouth every morning.    secukinumab (COSENTYX PEN) 150 mg/mL PnIj Inject 300 mg into the skin every 30 days.    sulfaSALAzine (AZULFIDINE) 500 MG TbEC TAKE 3 TABLETS BY MOUTH  TWICE A DAY    traZODone (DESYREL) 50 MG tablet Take 1 tablet (50 mg total) by mouth nightly as needed for Insomnia.    varicella-zoster gE-AS01B, PF, (SHINGRIX) 50 mcg/0.5 mL injection Inject into the muscle.    venlafaxine (EFFEXOR-XR) 150 MG Cp24 Take 1 capsule (150 mg total) by mouth once daily.     No current facility-administered medications for this visit.       Facility-Administered Medications Ordered in Other Visits   Medication    phenylephrine HCL 2.5% ophthalmic solution 1 drop    proparacaine 0.5 % ophthalmic solution 1 drop    tropicamide 1% ophthalmic solution 1 drop       REVIEW OF SYSTEMS:    GENERAL:  No weight loss, malaise or fevers.  HEENT:   No recent changes in vision or hearing  NECK:  Negative for lumps, no difficulty with swallowing.  RESPIRATORY:  Negative for cough, wheezing or shortness of breath, patient denies any recent URI.  CARDIOVASCULAR:  Negative for chest pain, leg swelling or palpitations.  GI:  Negative for abdominal discomfort, blood in stools or black stools or change in bowel habits.  MUSCULOSKELETAL:  See HPI.  SKIN:  Negative for lesions, rash, and itching.  PSYCH:  No mood disorder or recent psychosocial stressors.  Patients sleep is not disturbed secondary to pain.  HEMATOLOGY/LYMPHOLOGY:  Negative for prolonged bleeding, bruising easily or swollen nodes.  Patient is not currently taking any anti-coagulants  NEURO:   No history of headaches, syncope, paralysis, seizures or tremors.  All other reviewed and negative other than HPI.    OBJECTIVE:    Physical Exam  There were no vitals filed for this visit.  Ortho/SPM Exam      ASSESSMENT:  64-year-old female with chronic low back, bilateral hip and left groin pain.  The pain is nonradiating and exacerbated by prolonged sitting, standing and rolling over in bed.  Patient states that this pain limits her ability to accomplish some of her activities of daily living, Patient's description of pain is consistent with bilateral sacroiliac joint dysfunction.  The patient has benefited from a left then right DR 5+ lateral branches of the S1, S2 and S3 RFA she states that this procedure provided her with 80-90% relief for 6 months.  Recommended repeating a left then right DR 5, lateral branch RFA patient agreed and understood.    1. Bilateral sacroiliitis     2. Sacroiliac joint  dysfunction of both sides     3. Chronic pain syndrome     4. Chronic right-sided low back pain without sciatica     5. Acute bilateral low back pain without sciatica           PLAN:     - I have stressed the importance of physical activity and a home exercise plan to help with pain and improve health.  - Referral to Physical therapy for Lumbar stabilization, core strengthening, and a home exercise program.  - Patient can continue with medications for now since they are providing benefits, using them appropriately, and without side effects.  - Counseled patient regarding the importance of activity modification, constant sleeping habits and physical therapy.  - recommend scheduling for a left then right  DR 5+ lateral branches of the S1, S2 and S3 RFA  The above plan and management options were discussed at length with patient. Patient is in agreement with the above and verbalized understanding. It will be communicated with the referring physician via electronic record, fax, or mail.  The above plan and management options were discussed at length with patient. Patient is in agreement with the above and verbalized understanding. Dr. Pineda was consulted on this patient  and agrees with this plan.      LUI Brown  Interventional Pain Management      05/05/2020     Disclaimer: This note was partly generated using dictation software which may occasionally result in transcription errors.

## 2020-05-06 ENCOUNTER — TELEPHONE (OUTPATIENT)
Dept: RHEUMATOLOGY | Facility: CLINIC | Age: 65
End: 2020-05-06

## 2020-05-06 ENCOUNTER — LAB VISIT (OUTPATIENT)
Dept: LAB | Facility: HOSPITAL | Age: 65
End: 2020-05-06
Attending: INTERNAL MEDICINE
Payer: MEDICARE

## 2020-05-06 DIAGNOSIS — Z51.81 ENCOUNTER FOR MONITORING SULFASALAZINE THERAPY: ICD-10-CM

## 2020-05-06 DIAGNOSIS — Z79.899 ENCOUNTER FOR MONITORING SULFASALAZINE THERAPY: ICD-10-CM

## 2020-05-06 LAB
ALBUMIN SERPL BCP-MCNC: 3.9 G/DL (ref 3.5–5.2)
ALP SERPL-CCNC: 70 U/L (ref 55–135)
ALT SERPL W/O P-5'-P-CCNC: 25 U/L (ref 10–44)
ANION GAP SERPL CALC-SCNC: 9 MMOL/L (ref 8–16)
AST SERPL-CCNC: 36 U/L (ref 10–40)
BASOPHILS # BLD AUTO: 0.07 K/UL (ref 0–0.2)
BASOPHILS NFR BLD: 1.2 % (ref 0–1.9)
BILIRUB SERPL-MCNC: 0.5 MG/DL (ref 0.1–1)
BUN SERPL-MCNC: 6 MG/DL (ref 8–23)
CALCIUM SERPL-MCNC: 8.9 MG/DL (ref 8.7–10.5)
CHLORIDE SERPL-SCNC: 100 MMOL/L (ref 95–110)
CO2 SERPL-SCNC: 26 MMOL/L (ref 23–29)
CREAT SERPL-MCNC: 0.8 MG/DL (ref 0.5–1.4)
CRP SERPL-MCNC: 0.2 MG/L (ref 0–8.2)
DIFFERENTIAL METHOD: ABNORMAL
EOSINOPHIL # BLD AUTO: 0.3 K/UL (ref 0–0.5)
EOSINOPHIL NFR BLD: 5.5 % (ref 0–8)
ERYTHROCYTE [DISTWIDTH] IN BLOOD BY AUTOMATED COUNT: 13.2 % (ref 11.5–14.5)
ERYTHROCYTE [SEDIMENTATION RATE] IN BLOOD BY WESTERGREN METHOD: <2 MM/HR (ref 0–36)
EST. GFR  (AFRICAN AMERICAN): >60 ML/MIN/1.73 M^2
EST. GFR  (NON AFRICAN AMERICAN): >60 ML/MIN/1.73 M^2
GLUCOSE SERPL-MCNC: 80 MG/DL (ref 70–110)
HCT VFR BLD AUTO: 39.6 % (ref 37–48.5)
HGB BLD-MCNC: 12.5 G/DL (ref 12–16)
IMM GRANULOCYTES # BLD AUTO: 0.01 K/UL (ref 0–0.04)
IMM GRANULOCYTES NFR BLD AUTO: 0.2 % (ref 0–0.5)
LYMPHOCYTES # BLD AUTO: 2 K/UL (ref 1–4.8)
LYMPHOCYTES NFR BLD: 34.1 % (ref 18–48)
MCH RBC QN AUTO: 32.5 PG (ref 27–31)
MCHC RBC AUTO-ENTMCNC: 31.6 G/DL (ref 32–36)
MCV RBC AUTO: 103 FL (ref 82–98)
MONOCYTES # BLD AUTO: 0.5 K/UL (ref 0.3–1)
MONOCYTES NFR BLD: 7.9 % (ref 4–15)
NEUTROPHILS # BLD AUTO: 3.1 K/UL (ref 1.8–7.7)
NEUTROPHILS NFR BLD: 51.1 % (ref 38–73)
NRBC BLD-RTO: 0 /100 WBC
PLATELET # BLD AUTO: 319 K/UL (ref 150–350)
PMV BLD AUTO: 9.3 FL (ref 9.2–12.9)
POTASSIUM SERPL-SCNC: 4.5 MMOL/L (ref 3.5–5.1)
PROT SERPL-MCNC: 6.4 G/DL (ref 6–8.4)
RBC # BLD AUTO: 3.85 M/UL (ref 4–5.4)
SODIUM SERPL-SCNC: 135 MMOL/L (ref 136–145)
WBC # BLD AUTO: 5.98 K/UL (ref 3.9–12.7)

## 2020-05-06 PROCEDURE — 85025 COMPLETE CBC W/AUTO DIFF WBC: CPT

## 2020-05-06 PROCEDURE — 86140 C-REACTIVE PROTEIN: CPT

## 2020-05-06 PROCEDURE — 85652 RBC SED RATE AUTOMATED: CPT

## 2020-05-06 PROCEDURE — 80053 COMPREHEN METABOLIC PANEL: CPT

## 2020-05-07 ENCOUNTER — TELEPHONE (OUTPATIENT)
Dept: RHEUMATOLOGY | Facility: CLINIC | Age: 65
End: 2020-05-07

## 2020-05-08 DIAGNOSIS — E78.5 HYPERLIPIDEMIA, UNSPECIFIED HYPERLIPIDEMIA TYPE: ICD-10-CM

## 2020-05-08 RX ORDER — ATORVASTATIN CALCIUM 80 MG/1
TABLET, FILM COATED ORAL
Qty: 90 TABLET | Refills: 3 | Status: SHIPPED | OUTPATIENT
Start: 2020-05-08 | End: 2021-05-05 | Stop reason: SDUPTHER

## 2020-05-09 NOTE — PROGRESS NOTES
Refill Authorization Note     is requesting a refill authorization.    Brief assessment and rationale for refill: APPROVE: prr               Medication reconciliation completed: No                         Comments:   Automatic Epic Protocol Generated Data:    Requested Prescriptions   Signed Prescriptions Disp Refills    atorvastatin (LIPITOR) 80 MG tablet 90 tablet 3     Sig: TAKE 1 TABLET(80 MG) BY MOUTH EVERY DAY       Cardiovascular:  Antilipid - Statins Passed - 5/8/2020 11:29 AM        Passed - Patient is at least 18 years old        Passed - Office visit in past 12 months or future 90 days.     Recent Outpatient Visits            3 days ago Bilateral sacroiliitis    [facility] MILLIE Watts    1 week ago Paranoid schizophrenia    [facility] - Psychiatry Carol Ann Poe MD    3 weeks ago Groin pain, left    [facility] - Internal Medicine Sadaf Vargas MD    1 month ago Paranoid schizophrenia    [facility] - Psychiatry Carol Ann Poe MD    2 months ago Posterior vitreous detachment, both eyes    [facility] - Ophthalmology D. Juan Alberto Skelton MD          Future Appointments              In 3 days Mode Cornelius MD [facility] - Rheumatology, [facility]    In 3 months Philipp Goel MD [facility] - Cardiology, [facility]                Passed - Lipid Panel completed in last 360 days     Lab Results   Component Value Date    CHOL 155 05/06/2020    HDL 91 (H) 05/06/2020    LDLCALC 53.0 (L) 05/06/2020    TRIG 55 05/06/2020             Passed - ALT is 94 or below and within 360 days     ALT   Date Value Ref Range Status   05/06/2020 25 10 - 44 U/L Final   02/03/2020 13 10 - 44 U/L Final   11/15/2019 26 10 - 44 U/L Final              Passed - AST is 54 or below and within 360 days     AST   Date Value Ref Range Status   05/06/2020 36 10 - 40 U/L Final   02/03/2020 23 10 - 40 U/L Final   11/15/2019 30 10 - 40 U/L Final     Comment:     Specimen slightly hemolyzed                  Appointments  past 12m or future 3m with PCP    Date Provider   Last Visit   4/15/2020 Sadaf Vargas MD   Next Visit   Visit date not found Sadaf Vargas MD   ED visits in past 90 days: 0     Note composed:8:29 PM 05/08/2020

## 2020-05-11 ENCOUNTER — OFFICE VISIT (OUTPATIENT)
Dept: RHEUMATOLOGY | Facility: CLINIC | Age: 65
End: 2020-05-11
Payer: MEDICARE

## 2020-05-11 VITALS — HEIGHT: 62 IN | WEIGHT: 104 LBS | BODY MASS INDEX: 19.14 KG/M2

## 2020-05-11 DIAGNOSIS — L40.50 PSA (PSORIATIC ARTHRITIS): Primary | ICD-10-CM

## 2020-05-11 DIAGNOSIS — M81.0 OSTEOPOROSIS, UNSPECIFIED OSTEOPOROSIS TYPE, UNSPECIFIED PATHOLOGICAL FRACTURE PRESENCE: ICD-10-CM

## 2020-05-11 DIAGNOSIS — M25.552 PAIN OF BOTH HIP JOINTS: ICD-10-CM

## 2020-05-11 DIAGNOSIS — M53.3 SACROILIAC JOINT DYSFUNCTION: ICD-10-CM

## 2020-05-11 DIAGNOSIS — M25.551 PAIN OF BOTH HIP JOINTS: ICD-10-CM

## 2020-05-11 PROCEDURE — 3288F FALL RISK ASSESSMENT DOCD: CPT | Mod: CPTII,95,, | Performed by: INTERNAL MEDICINE

## 2020-05-11 PROCEDURE — 99214 OFFICE O/P EST MOD 30 MIN: CPT | Mod: 95,,, | Performed by: INTERNAL MEDICINE

## 2020-05-11 PROCEDURE — 1100F PR PT FALLS ASSESS DOC 2+ FALLS/FALL W/INJURY/YR: ICD-10-PCS | Mod: CPTII,95,, | Performed by: INTERNAL MEDICINE

## 2020-05-11 PROCEDURE — 1159F PR MEDICATION LIST DOCUMENTED IN MEDICAL RECORD: ICD-10-PCS | Mod: 95,,, | Performed by: INTERNAL MEDICINE

## 2020-05-11 PROCEDURE — 1159F MED LIST DOCD IN RCRD: CPT | Mod: 95,,, | Performed by: INTERNAL MEDICINE

## 2020-05-11 PROCEDURE — 99214 PR OFFICE/OUTPT VISIT, EST, LEVL IV, 30-39 MIN: ICD-10-PCS | Mod: 95,,, | Performed by: INTERNAL MEDICINE

## 2020-05-11 PROCEDURE — 3288F PR FALLS RISK ASSESSMENT DOCUMENTED: ICD-10-PCS | Mod: CPTII,95,, | Performed by: INTERNAL MEDICINE

## 2020-05-11 PROCEDURE — 1100F PTFALLS ASSESS-DOCD GE2>/YR: CPT | Mod: CPTII,95,, | Performed by: INTERNAL MEDICINE

## 2020-05-11 ASSESSMENT — ROUTINE ASSESSMENT OF PATIENT INDEX DATA (RAPID3)
MDHAQ FUNCTION SCORE: .5
PSYCHOLOGICAL DISTRESS SCORE: 0
AM STIFFNESS SCORE: 0, NO
PAIN SCORE: 9
TOTAL RAPID3 SCORE: 5.89
PATIENT GLOBAL ASSESSMENT SCORE: 7
FATIGUE SCORE: 7

## 2020-05-11 NOTE — PROGRESS NOTES
I have personally reviewed the history, confirmed via exam findings, and discussed assessment and plan with fellow.

## 2020-05-11 NOTE — PROGRESS NOTES
Subjective:       Patient ID: Bhavna Landry is a 64 y.o. female.    Chief Complaint: Psoriatic Arthritis    HPI     The patient location is: Home  The chief complaint leading to consultation is: Psoriatic arthritis  Visit type: audiovisual  Total time spent with patient: 60 minutes  Each patient to whom he or she provides medical services by telemedicine is:  (1) informed of the relationship between the physician and patient and the respective role of any other health care provider with respect to management of the patient; and (2) notified that he or she may decline to receive medical services by telemedicine and may withdraw from such care at any time.    Notes:        The patient is a 64 year old female with a PMH of guttate psoriasis, psoriatic arthritis, osteoporosis (Zolendronic acid ordered by Dr Vargas but patient refused), OA s/p failed right TKA, lumbar spondylosis on diclofenac gel, Effexor, and gabapentin 400mg nightly, schizophrenia, and paroxysmal Afib who presents for follow up. She was last seen Feb 2020. She is on secukinumab 300mg monthly and sulfasalazine 1,500 mg BID. She was seen by her PCP recently for severe groin pain and was given hydrocodone. An Xray was done showing DJD. She describes the pain as stabbing/aching, in her butt, groin, and thigh. She denies any shooting pain, she has some improvement with Norco but when the medication wears of the pain recurs. She also reports pain and swelling in her right 1st DIP and 2nd PIP with a popping sensation. She has difficulty unscrewing her toothpaste. She also reports back pain in her lower back. She denies any new rashes.     Treatment History:   Enbrel: Discontinued in 2008 due to frequent infections    Review of Systems   Constitutional: Negative for fever and unexpected weight change.   HENT: Negative for trouble swallowing.    Eyes: Negative for redness.   Respiratory: Negative for cough and shortness of breath.    Cardiovascular: Negative  "for chest pain.   Gastrointestinal: Negative for constipation and diarrhea.   Genitourinary: Negative for dysuria and genital sores.   Skin: Negative for rash.   Neurological: Negative for headaches.   Hematological: Does not bruise/bleed easily.         Objective:   Ht 5' 2.4" (1.585 m)   Wt 47.2 kg (104 lb)   BMI 18.78 kg/m²      Physical Exam   Constitutional: She is well-developed, well-nourished, and in no distress. No distress.   Skin: She is not diaphoretic.     Musculoskeletal:   Tenderness on palpation of right 1st DIP and 2nd right PIP           2/3/2020   Tender (CLARK-28) 12 / 28    Swollen (CLARK-28) 4 / 28    Provider Global Assessment 20 (mm)   Patient Global Assessment 65 (mm)   ESR Not documented   CRP Not documented   CLARK-28 (ESR) Not documented   CLARK-28 (CRP) Not documented     CBC (5/6/2020): , otherwise WNL  CMP: WNL  Lipid panel: HDL 91, LDL 53, trig 55  ESR <2  CRP 0.2     Assessment:       1. PSA (psoriatic arthritis)    2. Pain of both hip joints    3. Sacroiliac joint dysfunction    4. Osteoporosis, unspecified osteoporosis type, unspecified pathological fracture presence        The patient is a 64 year old female with a PMH of guttate psoriasis, psoriatic arthritis, osteoporosis (Zolendronic acid ordered by Dr Vargas but patient refused), OA s/p failed right TKA, lumbar spondylosis on diclofenac gel, Effexor, and gabapentin 400mg nightly, schizophrenia, and paroxysmal Afib who presents for follow up. She was last seen Feb 2020. She is on secukinumab 300mg monthly and sulfasalazine 1,500 mg BID. She presents with bilateral hip pain, lower back pain, right 1st DIP and right 2nd PIP pain. This has improved significantly compared to her last visit (she had more joint involved before). It is unclear if the back/hip pain is secondary to her lumbar spondylosis, OA, or possibly a stress fracture (she has osteoporosis). We will request for MRI of the pelvis and bilateral hips and an Xray of " the lumbar spine. Will also check a vitamin D level.     In terms of her osteoporosis, we discussed the potential side effects of Reclast. This was previously ordered by Dr. Vargas. We will have her scheduled for her first infusion.     Plan:       Problem List Items Addressed This Visit        Neuro    Sacroiliac joint dysfunction    Relevant Orders    MRI Hip Without Contrast Left    MRI Hip Without Contrast Right    MRI Pelvis Without Contrast    X-Ray Lumbar Spine AP And Lateral    Vitamin D       Orthopedic    PSA (psoriatic arthritis) - Primary    Relevant Orders    MRI Hip Without Contrast Left    MRI Hip Without Contrast Right    MRI Pelvis Without Contrast    X-Ray Lumbar Spine AP And Lateral    Vitamin D      Other Visit Diagnoses     Pain of both hip joints        Relevant Orders    MRI Hip Without Contrast Left    MRI Hip Without Contrast Right    MRI Pelvis Without Contrast    X-Ray Lumbar Spine AP And Lateral    Vitamin D    Osteoporosis, unspecified osteoporosis type, unspecified pathological fracture presence        Relevant Orders    MRI Hip Without Contrast Left    MRI Hip Without Contrast Right    MRI Pelvis Without Contrast    X-Ray Lumbar Spine AP And Lateral    Vitamin D        - Continue secukinumab 300mg monthly and sulfasalazine 1,500 mg BID.  - Request for MRI of the pelvis and bilateral hips and an Xray of the lumbar spine  - If MRI is negative, will consider sending the patient to the [facility] back program  - Request for vitamin D level  - Schedule for Reclast infusion  - Patient is due for Shingrix     Patient seen and discussed with Dr. Cornelius    RTC in 3 months    Yulisa Simental M.D.  Rheumatology, PGY 4

## 2020-05-11 NOTE — PROGRESS NOTES
Answers for HPI/ROS submitted by the patient on 5/7/2020   fever: No  eye redness: No  headaches: No  shortness of breath: No  chest pain: No  trouble swallowing: No  diarrhea: No  constipation: No  unexpected weight change: No  genital sore: No  dysuria: No  During the last 3 days, have you had a skin rash?: No  Bruises or bleeds easily: No  cough: No

## 2020-05-12 ENCOUNTER — HOSPITAL ENCOUNTER (OUTPATIENT)
Dept: RADIOLOGY | Facility: HOSPITAL | Age: 65
Discharge: HOME | End: 2020-05-12
Attending: STUDENT IN AN ORGANIZED HEALTH CARE EDUCATION/TRAINING PROGRAM
Payer: MEDICARE

## 2020-05-12 DIAGNOSIS — L40.50 PSA (PSORIATIC ARTHRITIS): ICD-10-CM

## 2020-05-12 DIAGNOSIS — M25.552 PAIN OF BOTH HIP JOINTS: ICD-10-CM

## 2020-05-12 DIAGNOSIS — M25.551 PAIN OF BOTH HIP JOINTS: ICD-10-CM

## 2020-05-12 DIAGNOSIS — L40.50 PSA (PSORIATIC ARTHRITIS): Primary | ICD-10-CM

## 2020-05-12 DIAGNOSIS — M53.3 SACROILIAC JOINT DYSFUNCTION: ICD-10-CM

## 2020-05-12 DIAGNOSIS — M81.0 OSTEOPOROSIS, UNSPECIFIED OSTEOPOROSIS TYPE, UNSPECIFIED PATHOLOGICAL FRACTURE PRESENCE: ICD-10-CM

## 2020-05-12 PROCEDURE — 72100 X-RAY EXAM L-S SPINE 2/3 VWS: CPT | Mod: 26,,, | Performed by: RADIOLOGY

## 2020-05-12 PROCEDURE — 72100 XR LUMBAR SPINE AP AND LATERAL: ICD-10-PCS | Mod: 26,,, | Performed by: RADIOLOGY

## 2020-05-12 PROCEDURE — 72100 X-RAY EXAM L-S SPINE 2/3 VWS: CPT | Mod: TC,FY,PO

## 2020-05-14 ENCOUNTER — TELEPHONE (OUTPATIENT)
Dept: INTERNAL MEDICINE | Facility: CLINIC | Age: 65
End: 2020-05-14

## 2020-05-14 ENCOUNTER — TELEPHONE (OUTPATIENT)
Dept: PAIN MEDICINE | Facility: CLINIC | Age: 65
End: 2020-05-14

## 2020-05-14 DIAGNOSIS — M46.1 SACROILIITIS: Primary | ICD-10-CM

## 2020-05-14 DIAGNOSIS — Z01.818 PREOP TESTING: Primary | ICD-10-CM

## 2020-05-14 DIAGNOSIS — M53.3 SACROILIAC JOINT DYSFUNCTION OF RIGHT SIDE: ICD-10-CM

## 2020-05-14 DIAGNOSIS — M53.3 SACROILIAC JOINT DYSFUNCTION OF LEFT SIDE: ICD-10-CM

## 2020-05-14 NOTE — TELEPHONE ENCOUNTER
Pt scheduled for 6/2/2020 at 0915 am for Right DR5 RFA. Pt aware to check in at registration desk on the first floor of the hospital for 0815 am. Pt is taking ASA and aware to hold 3 days prior to procedure. Pt aware to walk in at [facility] on 5/31/20 for covid testing.

## 2020-05-14 NOTE — TELEPHONE ENCOUNTER
Pt scheduled for 5/26/20 at 0945 am for Left DR5 RFA. Pt aware to check in at registration desk on the first floor of the hospital for 0845 am. Pt is taking ASA and aware to hold 3 days prior to procedure. Pt aware to walk in at [facility] on 5/24/20 for covid testing.

## 2020-05-14 NOTE — TELEPHONE ENCOUNTER
----- Message from Caro Dupont sent at 5/14/2020  4:48 PM CDT -----  Contact: self/[phone]  Pt states can the dr give her a call back to explain the xrays she took with dr hancock on 5/12 into terms she can understand.Pt states she is also have MRI done on 5/19 that dr hancock ordered.  Please call and advise

## 2020-05-15 RX ORDER — HYDROCODONE BITARTRATE AND ACETAMINOPHEN 5; 325 MG/1; MG/1
1 TABLET ORAL EVERY 8 HOURS PRN
Qty: 30 TABLET | Refills: 0 | Status: SHIPPED | OUTPATIENT
Start: 2020-05-15 | End: 2020-06-15 | Stop reason: SDUPTHER

## 2020-05-15 NOTE — TELEPHONE ENCOUNTER
----- Message from Salena Lopes sent at 5/15/2020  2:23 PM CDT -----  Contact: [phone]  Requesting an RX refill or new RX.  Is this a refill or new RX:  Refill  RX name and strength: HYDROcodone-acetaminophen (NORCO) 5-325 mg per tablet  Directions (copy/paste from chart): Take 1 tablet by mouth every 8 (eight) hours as needed for Pain.   Is this a 30 day or 90 day RX:  30  Local pharmacy or mail order pharmacy:  Local   Pharmacy name and phone # (copy/paste from chart):[facility]   [phone] (Phone)  [phone] (Fax)   Comments:

## 2020-05-18 ENCOUNTER — TELEPHONE (OUTPATIENT)
Dept: INTERNAL MEDICINE | Facility: CLINIC | Age: 65
End: 2020-05-18

## 2020-05-18 ENCOUNTER — TELEPHONE (OUTPATIENT)
Dept: PAIN MEDICINE | Facility: CLINIC | Age: 65
End: 2020-05-18

## 2020-05-18 NOTE — TELEPHONE ENCOUNTER
Pt requesting Lumbar MRI to be done. Pt states she is having right and left hip MRI done tomorrow. Pt would to an order to have lumbar MRI done and get it scheduled for another day.

## 2020-05-18 NOTE — TELEPHONE ENCOUNTER
pls call - xray was ordered by Dr Mcconnell and showed arthritic changes.    She really needs to discuss with him.  OA changes could certainly cause lower back pain.     MRIs have also been ordered, and will give us more information.

## 2020-05-18 NOTE — TELEPHONE ENCOUNTER
----- Message from Corine Pope sent at 5/18/2020 10:40 AM CDT -----  Contact: Self   Patient state she have questions about her xray. Please call and advise.

## 2020-05-18 NOTE — TELEPHONE ENCOUNTER
----- Message from Shawna Sibley sent at 5/18/2020  4:44 PM CDT -----  Contact: Pt  Pt requesting to speak with the Nurse    Pt states that she needs to know if Dr Pineda would do a MRI of Her Spine Tomorrow, She's in severe pain    Please call and advise    Phone [phone]

## 2020-05-19 ENCOUNTER — TELEPHONE (OUTPATIENT)
Dept: RHEUMATOLOGY | Facility: CLINIC | Age: 65
End: 2020-05-19

## 2020-05-19 ENCOUNTER — HOSPITAL ENCOUNTER (OUTPATIENT)
Dept: RADIOLOGY | Facility: HOSPITAL | Age: 65
Discharge: HOME | End: 2020-05-19
Attending: STUDENT IN AN ORGANIZED HEALTH CARE EDUCATION/TRAINING PROGRAM
Payer: MEDICARE

## 2020-05-19 DIAGNOSIS — M53.3 SACROILIAC JOINT DYSFUNCTION: ICD-10-CM

## 2020-05-19 DIAGNOSIS — M81.0 OSTEOPOROSIS, UNSPECIFIED OSTEOPOROSIS TYPE, UNSPECIFIED PATHOLOGICAL FRACTURE PRESENCE: ICD-10-CM

## 2020-05-19 DIAGNOSIS — M25.552 PAIN OF BOTH HIP JOINTS: ICD-10-CM

## 2020-05-19 DIAGNOSIS — M25.551 PAIN OF BOTH HIP JOINTS: ICD-10-CM

## 2020-05-19 DIAGNOSIS — S32.401A CLOSED NONDISPLACED FRACTURE OF RIGHT ACETABULUM, UNSPECIFIED PORTION OF ACETABULUM, INITIAL ENCOUNTER: ICD-10-CM

## 2020-05-19 DIAGNOSIS — L40.50 PSA (PSORIATIC ARTHRITIS): ICD-10-CM

## 2020-05-19 DIAGNOSIS — R18.8 FREE FLUID IN PELVIS: Primary | ICD-10-CM

## 2020-05-19 PROCEDURE — 73721 MRI JNT OF LWR EXTRE W/O DYE: CPT | Mod: 26,RT,, | Performed by: RADIOLOGY

## 2020-05-19 PROCEDURE — 72195 MRI PELVIS W/O DYE: CPT | Mod: 26,,, | Performed by: RADIOLOGY

## 2020-05-19 PROCEDURE — 73721 MRI JNT OF LWR EXTRE W/O DYE: CPT | Mod: TC,RT

## 2020-05-19 PROCEDURE — 72195 MRI PELVIS W/O DYE: CPT | Mod: TC

## 2020-05-19 PROCEDURE — 72195 MRI PELVIS WITHOUT CONTRAST: ICD-10-PCS | Mod: 26,,, | Performed by: RADIOLOGY

## 2020-05-19 PROCEDURE — 73721 MRI HIP WITHOUT CONTRAST LEFT: ICD-10-PCS | Mod: 26,LT,, | Performed by: RADIOLOGY

## 2020-05-19 PROCEDURE — 73721 MRI JNT OF LWR EXTRE W/O DYE: CPT | Mod: 26,LT,, | Performed by: RADIOLOGY

## 2020-05-19 PROCEDURE — 73721 MRI HIP WITHOUT CONTRAST RIGHT: ICD-10-PCS | Mod: 26,RT,, | Performed by: RADIOLOGY

## 2020-05-19 PROCEDURE — 73721 MRI JNT OF LWR EXTRE W/O DYE: CPT | Mod: TC,LT

## 2020-05-20 ENCOUNTER — TELEPHONE (OUTPATIENT)
Dept: PAIN MEDICINE | Facility: CLINIC | Age: 65
End: 2020-05-20

## 2020-05-20 ENCOUNTER — PATIENT OUTREACH (OUTPATIENT)
Dept: ADMINISTRATIVE | Facility: OTHER | Age: 65
End: 2020-05-20

## 2020-05-20 ENCOUNTER — TELEPHONE (OUTPATIENT)
Dept: RHEUMATOLOGY | Facility: CLINIC | Age: 65
End: 2020-05-20

## 2020-05-20 NOTE — TELEPHONE ENCOUNTER
Please schedule  CT right hip to r/o acetabular fracture prior to Orthopedic consult this week for possible non-displaced right acetabular fracture.  Also please schedule Ob-Gyn to evaluate small amount of pelvic fluid seen on MRI. Thanks YEN

## 2020-05-20 NOTE — TELEPHONE ENCOUNTER
I spoke with Ms. Gris and clarified that she has been referred to ortho for an insufficiency fracture noted on pelvic MRI, and that she will need to see OBGYN for some fluid incidentally noted in the pelvis on MRI.

## 2020-05-20 NOTE — TELEPHONE ENCOUNTER
Pt called stating She Hip MRIs done yesterday and was told to see orthopedic for slip disc. Pt wanting to know if she should still have procedures done. Please advise.

## 2020-05-20 NOTE — TELEPHONE ENCOUNTER
----- Message from Lyubov Fish sent at 5/20/2020  8:23 AM CDT -----  Contact: patient  Patient called to speak with a nurse concerning upcoming procedures.    She would like a callback at [phone]    Thanks  KB

## 2020-05-21 ENCOUNTER — OFFICE VISIT (OUTPATIENT)
Dept: ORTHOPEDICS | Facility: CLINIC | Age: 65
End: 2020-05-21
Payer: MEDICARE

## 2020-05-21 VITALS — WEIGHT: 102.06 LBS | BODY MASS INDEX: 18.78 KG/M2 | HEIGHT: 62 IN

## 2020-05-21 DIAGNOSIS — S32.401A CLOSED NONDISPLACED FRACTURE OF RIGHT ACETABULUM, UNSPECIFIED PORTION OF ACETABULUM, INITIAL ENCOUNTER: ICD-10-CM

## 2020-05-21 DIAGNOSIS — M54.9 BACK PAIN, UNSPECIFIED BACK LOCATION, UNSPECIFIED BACK PAIN LATERALITY, UNSPECIFIED CHRONICITY: Primary | ICD-10-CM

## 2020-05-21 PROCEDURE — 99213 PR OFFICE/OUTPT VISIT, EST, LEVL III, 20-29 MIN: ICD-10-PCS | Mod: S$GLB,,, | Performed by: NURSE PRACTITIONER

## 2020-05-21 PROCEDURE — 1125F AMNT PAIN NOTED PAIN PRSNT: CPT | Mod: S$GLB,,, | Performed by: NURSE PRACTITIONER

## 2020-05-21 PROCEDURE — 1101F PT FALLS ASSESS-DOCD LE1/YR: CPT | Mod: CPTII,S$GLB,, | Performed by: NURSE PRACTITIONER

## 2020-05-21 PROCEDURE — 99999 PR PBB SHADOW E&M-EST. PATIENT-LVL III: ICD-10-PCS | Mod: PBBFAC,,, | Performed by: NURSE PRACTITIONER

## 2020-05-21 PROCEDURE — 1159F PR MEDICATION LIST DOCUMENTED IN MEDICAL RECORD: ICD-10-PCS | Mod: S$GLB,,, | Performed by: NURSE PRACTITIONER

## 2020-05-21 PROCEDURE — 1125F PR PAIN SEVERITY QUANTIFIED, PAIN PRESENT: ICD-10-PCS | Mod: S$GLB,,, | Performed by: NURSE PRACTITIONER

## 2020-05-21 PROCEDURE — 99999 PR PBB SHADOW E&M-EST. PATIENT-LVL III: CPT | Mod: PBBFAC,,, | Performed by: NURSE PRACTITIONER

## 2020-05-21 PROCEDURE — 99213 OFFICE O/P EST LOW 20 MIN: CPT | Mod: S$GLB,,, | Performed by: NURSE PRACTITIONER

## 2020-05-21 PROCEDURE — 1101F PR PT FALLS ASSESS DOC 0-1 FALLS W/OUT INJ PAST YR: ICD-10-PCS | Mod: CPTII,S$GLB,, | Performed by: NURSE PRACTITIONER

## 2020-05-21 PROCEDURE — 1159F MED LIST DOCD IN RCRD: CPT | Mod: S$GLB,,, | Performed by: NURSE PRACTITIONER

## 2020-05-21 NOTE — LETTER
May 22, 2020      Mode Cornelius MD  [address]  [facility] [address]           [facility]  [address]  [facility] [address]  Phone: [phone]          Patient: Bhavna Landry   MR Number: [id number]   YOB: 1955   Date of Visit: 5/21/2020       Dear Dr. Mode Cornelius:    Thank you for referring Bhavna Landry to me for evaluation. Attached you will find relevant portions of my assessment and plan of care.    If you have questions, please do not hesitate to call me. I look forward to following Bhavna Landry along with you.    Sincerely,    Izzy Blackmon, NP    Enclosure  CC:  No Recipients    If you would like to receive this communication electronically, please contact [email or web address] or [phone] to request more information on [employer] access.    For providers and/or their staff who would like to refer a patient to [facility], please contact us through our one-stop-shop provider referral line, Maddie Daniels, at [phone].    If you feel you have received this communication in error or would no longer like to receive these types of communications, please e-mail [email or web address]

## 2020-05-21 NOTE — PROGRESS NOTES
CC: Pain of the Left Hip  Referred for right acetabular fracture noted on MRI    Pt is seen with Dr. Evans    HPI: Pt with c/o left hip pain for the past few weeks. She was seen in rheumatology and had an MRI which showed a right acetabular fracture. She has scoliosis with tightness and pain across her lower back. She was referred for the fracture. Dr. Evans is seeing her in my clinic. She is ambulating without assistive device. There is not a limp, but she does c/o pain.    ROS  General: denies fever and chills  Resp: no c/o sob  CVS: no c/o cp  MSK: c/o left hip pain and lower back pain    PE  General: AAOx3, pleasant and cooperative  Resp: respirations even and unlabored  MSK: left hip exam  - [facility]  - straight leg raise  - pain with internal rotation  - pain with external rotation  Mild pain over the greater trochanter  + tightness with stretching    Xray:  Reviewed by Dr. Evans: Two views: No fracture, dislocation, or bone destruction seen.  There is mild DJD.    Assessment:  Low back pain  scoliosis    Plan:  PT ordered as recommended by Dr. Evans- to include stretching  nsaids prn

## 2020-05-22 ENCOUNTER — PATIENT MESSAGE (OUTPATIENT)
Dept: RHEUMATOLOGY | Facility: CLINIC | Age: 65
End: 2020-05-22

## 2020-05-22 ENCOUNTER — TELEPHONE (OUTPATIENT)
Dept: RHEUMATOLOGY | Facility: HOSPITAL | Age: 65
End: 2020-05-22

## 2020-05-24 ENCOUNTER — CLINICAL SUPPORT (OUTPATIENT)
Dept: URGENT CARE | Facility: CLINIC | Age: 65
End: 2020-05-24
Payer: MEDICARE

## 2020-05-24 VITALS — TEMPERATURE: 97 F | OXYGEN SATURATION: 95 % | HEART RATE: 82 BPM

## 2020-05-25 NOTE — DISCHARGE INSTRUCTIONS
Home Care Instructions Pain Management:    1.  DIET:    You may resume your normal diet today.    2.  BATHING:    You may shower with luke warm water.    3.  DRESSING:    You may remove your bandage today.    4.  ACTIVITY LEVEL:      You may resume your normal activities 24 hours after your procedure.    5.  MEDICATIONS:    You may resume your normal medications today.    6.  SPECIAL INSTRUCTIONS:    No heat to the injection site for 24 hours including bath or shower, heating pad, moist heat or hot tubs.    Use an ice pack to the injection site for any pain or discomfort.  Apply ice packs for 20 minute intervals as needed.    If you have received any sedatives by mouth today, you can not drive for 12 hours.    If you have received sedation through an IV, you can not drive for 24 hours.    PLEASE CALL YOUR DOCTOR FOR THE FOLLOWIN.  Redness or swelling around the injection site.  2.  Fever of 101 degrees.  3.  Drainage (pus) from the injection site.  4.  For any continuous bleeding (some dried blood over the incision is normal.)    FOR EMERGENCIES:    If any unusual problems or difficulties occur during clinic hours, call [phone] or dial [phone].    Follow up with with your physician in 2-3 weeks.

## 2020-05-26 ENCOUNTER — HOSPITAL ENCOUNTER (OUTPATIENT)
Facility: HOSPITAL | Age: 65
Discharge: HOME | End: 2020-05-26
Attending: PAIN MEDICINE | Admitting: PAIN MEDICINE
Payer: MEDICARE

## 2020-05-26 VITALS
OXYGEN SATURATION: 97 % | DIASTOLIC BLOOD PRESSURE: 82 MMHG | BODY MASS INDEX: 18.58 KG/M2 | WEIGHT: 101 LBS | RESPIRATION RATE: 16 BRPM | SYSTOLIC BLOOD PRESSURE: 192 MMHG | HEART RATE: 73 BPM | TEMPERATURE: 98 F | HEIGHT: 62 IN

## 2020-05-26 DIAGNOSIS — M46.1 SACROILIITIS: Primary | ICD-10-CM

## 2020-05-26 DIAGNOSIS — G89.29 CHRONIC PAIN: ICD-10-CM

## 2020-05-26 DIAGNOSIS — M53.3 SACROILIAC JOINT DYSFUNCTION OF LEFT SIDE: ICD-10-CM

## 2020-05-26 PROCEDURE — 99152 MOD SED SAME PHYS/QHP 5/>YRS: CPT | Performed by: PAIN MEDICINE

## 2020-05-26 PROCEDURE — 64635 PR DESTROY LUMB/SAC FACET JNT: ICD-10-PCS | Mod: LT,,, | Performed by: PAIN MEDICINE

## 2020-05-26 PROCEDURE — 64636 DESTROY L/S FACET JNT ADDL: CPT | Mod: LT,,, | Performed by: PAIN MEDICINE

## 2020-05-26 PROCEDURE — 63600175 PHARM REV CODE 636 W HCPCS: Performed by: PAIN MEDICINE

## 2020-05-26 PROCEDURE — 64636 DESTROY L/S FACET JNT ADDL: CPT | Mod: LT | Performed by: PAIN MEDICINE

## 2020-05-26 PROCEDURE — 25000003 PHARM REV CODE 250: Performed by: PAIN MEDICINE

## 2020-05-26 PROCEDURE — 64635 DESTROY LUMB/SAC FACET JNT: CPT | Performed by: PAIN MEDICINE

## 2020-05-26 PROCEDURE — 99153 MOD SED SAME PHYS/QHP EA: CPT | Performed by: PAIN MEDICINE

## 2020-05-26 PROCEDURE — A4649 SURGICAL SUPPLIES: HCPCS | Performed by: PAIN MEDICINE

## 2020-05-26 PROCEDURE — 64635 DESTROY LUMB/SAC FACET JNT: CPT | Mod: LT,,, | Performed by: PAIN MEDICINE

## 2020-05-26 PROCEDURE — 64636 PR DESTROY L/S FACET JNT ADDL: ICD-10-PCS | Mod: LT,,, | Performed by: PAIN MEDICINE

## 2020-05-26 RX ORDER — SODIUM BICARBONATE 1 MEQ/ML
SYRINGE (ML) INTRAVENOUS
Status: DISCONTINUED | OUTPATIENT
Start: 2020-05-26 | End: 2020-05-26 | Stop reason: HOSPADM

## 2020-05-26 RX ORDER — LIDOCAINE HYDROCHLORIDE 20 MG/ML
INJECTION, SOLUTION EPIDURAL; INFILTRATION; INTRACAUDAL; PERINEURAL
Status: DISCONTINUED | OUTPATIENT
Start: 2020-05-26 | End: 2020-05-26 | Stop reason: HOSPADM

## 2020-05-26 RX ORDER — FENTANYL CITRATE 50 UG/ML
INJECTION, SOLUTION INTRAMUSCULAR; INTRAVENOUS
Status: DISCONTINUED | OUTPATIENT
Start: 2020-05-26 | End: 2020-05-26 | Stop reason: HOSPADM

## 2020-05-26 RX ORDER — LIDOCAINE HYDROCHLORIDE 10 MG/ML
1 INJECTION, SOLUTION EPIDURAL; INFILTRATION; INTRACAUDAL; PERINEURAL ONCE
Status: DISCONTINUED | OUTPATIENT
Start: 2020-05-26 | End: 2022-05-30

## 2020-05-26 RX ORDER — BUPIVACAINE HYDROCHLORIDE 2.5 MG/ML
INJECTION, SOLUTION EPIDURAL; INFILTRATION; INTRACAUDAL
Status: DISCONTINUED | OUTPATIENT
Start: 2020-05-26 | End: 2020-05-26 | Stop reason: HOSPADM

## 2020-05-26 RX ORDER — MIDAZOLAM HYDROCHLORIDE 1 MG/ML
INJECTION, SOLUTION INTRAMUSCULAR; INTRAVENOUS
Status: DISCONTINUED | OUTPATIENT
Start: 2020-05-26 | End: 2020-05-26 | Stop reason: HOSPADM

## 2020-05-26 RX ORDER — SODIUM CHLORIDE 9 MG/ML
500 INJECTION, SOLUTION INTRAVENOUS CONTINUOUS
Status: DISCONTINUED | OUTPATIENT
Start: 2020-05-26 | End: 2022-05-30

## 2020-05-26 RX ORDER — METHYLPREDNISOLONE ACETATE 40 MG/ML
INJECTION, SUSPENSION INTRA-ARTICULAR; INTRALESIONAL; INTRAMUSCULAR; SOFT TISSUE
Status: DISCONTINUED | OUTPATIENT
Start: 2020-05-26 | End: 2020-05-26 | Stop reason: HOSPADM

## 2020-05-26 RX ORDER — LIDOCAINE HYDROCHLORIDE 10 MG/ML
INJECTION INFILTRATION; PERINEURAL
Status: DISCONTINUED | OUTPATIENT
Start: 2020-05-26 | End: 2020-05-26 | Stop reason: HOSPADM

## 2020-05-26 NOTE — OP NOTE
Procedure Note    Preoperative Diagnosis: Lumbosacral spondylosis  Postoperative Diagnosis: Lumbosacral spondylosis  Procedure Date: 05/26/2020  Procedure Title:  (1) Radiofrequency Ablation of Left L5 Dorsal Ramus  (2) Radiofrequency Ablation of Left Lateral Branches of S1, S2, and S3     (2) Intraoperative Fluoroscopy     (3) IV Moderate Sedation (Midazolam 2 mg, Fentanyl 50 mcg)    Kit / Equipment: [employer]i Mobi Synergy Kit with 75 mm Introducer and 2 mm Active Tip ([id number])    Anesthesia: Local    Indications: Bhavna Landry is a 64 y.o. year-old female with chronic low back pain due to sacroiliac joint dysfunction.  The patient had significant relief of the pain with the previous diagnostic nerve blocks.     Findings: Final needle placement consistent with technically sucsessful procedure.     Procedure in Detail: The patients history and physical exam were reviewed.  Informed consent was verified and a time out was performed in the presence of the patient prior to administration of IV sedation. The patient agreed to proceed.    The patient was placed in a prone position and head resting comfortably in a head ring.  Cardiopulmonary monitoring was initiated and medications to achieve IV moderate sedation were administered.  The lumbosacral area was prepped and draped in sterile fashion.    AP and ipsilateral oblique fluoroscopic views were employed to visualize the sacral ala, the location of the L5 dorsal ramus.  Skin overlying the target point was anesthetized with 2-3 mL of lidocaine 1%.  A 14 gauge styletted cannula was advanced through the anesthetized skin to contact os at the sacral ala.  Needle placement was inspected and optimized in lateral fluoroscopic views.  Motor stimulation at 2 Hz & 2 V was negative for muscle contractions in the L5 nerve distribution except for local multifidus twitch.  One mL of lidocaine 2% was injected followed by radiofrequency ablation for 2 min 30 sec with a tissue  temperature of 80° C.    The fluoroscope was adjusted to optimize visualization of the dorsal sacral foramina and the corresponding arcuate lines.  A point approximately 5 mm lateral to the dorsal foramina of S1, S2, and S3 was marked on the skin.  A 14 gauge styletted cannula was advanced through the anesthetized skin to contact os at the targeted points.  Prior to lesioning, 1 mL of lidocaine 2% was injected. Radiofrequency lesions (as detailed above) were performed semi-circumferentially at 2 locations for S1, 3 locations for S2, and 1 location for S3.    A solution consisting of 3 mL 0.25% bupivacaine and 40 mg Depomedrol was divided evenly between the locations and injected through each probe. The probes were removed with a 1% lidocaine flush.  The patients back was cleaned and bandages were placed at the needle insertion sites.    Estimated blood loss: None    Complications: None     Disposition:  The patient tolerated the procedure well and there were no apparent complications. Vital signs remained stable throughout the procedure. The patient was taken to the recovery area where written discharge instructions for the procedure were given.     Follow-up: RTC as scheduled for clinic eval or next procedure.    Michelle Pineda Jr, MD  Interventional Pain Medicine / Anesthesiology

## 2020-05-26 NOTE — PROGRESS NOTES
MD Arrival Time:0952  Sedation Start Time:0952  Sedation End Time:1017  MD Departure Time:1017

## 2020-05-26 NOTE — DISCHARGE SUMMARY
[facility]  Discharge Note  Short Stay     Admit Date: 5/26/2020    Discharge Date: 5/26/2020     Attending Physician: Michelle Pineda Jr, MD    Diagnoses:  Active Hospital Problems    Diagnosis  POA    Chronic pain [G89.29]  Yes      Resolved Hospital Problems   No resolved problems to display.     Discharged Condition: Good     Hospital Course: Patient was admitted for an outpatient interventional pain management procedure and tolerated the procedure well with no complications.     Final Diagnoses: Same as principal problem.     Disposition: Home or Self Care     Follow up/Patient Instructions:    Follow-up Information     Go to Michelle Pineda Jr, MD.    Specialty:  Pain Medicine  Why:  Post-procedural Follow Up As Scheduled  Contact information:  [address]  [address]  [place] [address] [phone]                   Reconciled Medications:     Medication List      CONTINUE taking these medications    * albuterol 1.25 mg/3 mL Nebu  Commonly known as:  ACCUNEB  TAKE 3 ML BY NEBULIZATION EVERY 6 HOURS AS NEEDED     * albuterol 90 mcg/actuation inhaler  Commonly known as:  PROAIR HFA  2 puffs qid prn     amLODIPine 5 MG tablet  Commonly known as:  NORVASC  TAKE 1 TABLET BY MOUTH EVERY DAY     amoxicillin-clavulanate 875-125mg 875-125 mg per tablet  Commonly known as:  AUGMENTIN  Take 1 tablet by mouth 2 (two) times daily.     atorvastatin 80 MG tablet  Commonly known as:  LIPITOR  TAKE 1 TABLET(80 MG) BY MOUTH EVERY DAY     baclofen 20 MG tablet  Commonly known as:  LIORESAL  Take 1 tablet (20 mg total) by mouth 3 (three) times daily.     benzonatate 100 MG capsule  Commonly known as:  TESSALON PERLES  Take 1 capsule (100 mg total) by mouth every 6 (six) hours as needed for Cough.     benztropine 0.5 MG tablet  Commonly known as:  COGENTIN  Take 1 tablet (0.5 mg total) by mouth 2 (two) times daily.     diclofenac sodium 1 % Gel  Commonly known as:  VOLTAREN  Apply 2 g topically 4 (four)  times daily as needed.     gabapentin 400 MG capsule  Commonly known as:  NEURONTIN  Take 1 capsule (400 mg total) by mouth every evening.     hydroCHLOROthiazide 25 MG tablet  Commonly known as:  HYDRODIURIL  TAKE 1 TABLET(25 MG) BY MOUTH EVERY DAY     HYDROcodone-acetaminophen 5-325 mg per tablet  Commonly known as:  NORCO  Take 1 tablet by mouth every 8 (eight) hours as needed for Pain.     lisinopriL 10 MG tablet  Take 10 mg by mouth daily as needed.     metoprolol succinate 25 MG 24 hr tablet  Commonly known as:  TOPROL-XL  Take 0.5 tablets (12.5 mg total) by mouth once daily.     * risperiDONE 2 MG tablet  Commonly known as:  RISPERDAL  Take 1 tablet (2 mg total) by mouth every evening.     * risperiDONE 4 MG tablet  Commonly known as:  RISPERDAL  Take 1 tablet (4 mg total) by mouth every morning.     secukinumab 150 mg/mL Pnij  Commonly known as:  COSENTYX PEN (2 PENS)  Inject 300 mg into the skin every 30 days.     sulfaSALAzine 500 MG Tbec  Commonly known as:  AZULFIDINE  TAKE 3 TABLETS BY MOUTH  TWICE A DAY     traZODone 50 MG tablet  Commonly known as:  DESYREL  Take 1 tablet (50 mg total) by mouth nightly as needed for Insomnia.     varicella-zoster gE-AS01B (PF) 50 mcg/0.5 mL injection  Commonly known as:  SHINGRIX  Inject into the muscle.     venlafaxine 150 MG Cp24  Commonly known as:  EFFEXOR-XR  Take 1 capsule (150 mg total) by mouth once daily.         * This list has 4 medication(s) that are the same as other medications prescribed for you. Read the directions carefully, and ask your doctor or other care provider to review them with you.               Discharge Procedure Orders (must include Diet, Follow-up, Activity)   Call MD for:  temperature >100.4     Call MD for:  severe uncontrolled pain     Call MD for:  redness, tenderness, or signs of infection (pain, swelling, redness, odor or green/yellow discharge around incision site)     Call MD for:  difficulty breathing or increased cough     Call  MD for:  severe persistent headache     Call MD for:  worsening rash     Remove dressing in 24 hours       Michelle Pineda Jr, MD  Interventional Pain Medicine / Anesthesiology

## 2020-05-29 NOTE — DISCHARGE INSTRUCTIONS
Home Care Instructions Pain Management:    1.  DIET:    You may resume your normal diet today.    2.  BATHING:    You may shower with luke warm water.    3.  DRESSING:    You may remove your bandage today.    4.  ACTIVITY LEVEL:      You may resume your normal activities 24 hours after your procedure.    5.  MEDICATIONS:    You may resume your normal medications today.    6.  SPECIAL INSTRUCTIONS:    No heat to the injection site for 24 hours including bath or shower, heating pad, moist heat or hot tubs.    Use an ice pack to the injection site for any pain or discomfort.  Apply ice packs for 20 minute intervals as needed.    If you have received any sedatives by mouth today, you can not drive for 12 hours.    If you have received sedation through an IV, you can not drive for 24 hours.    PLEASE CALL YOUR DOCTOR FOR THE FOLLOWIN.  Redness or swelling around the injection site.  2.  Fever of 101 degrees.  3.  Drainage (pus) from the injection site.  4.  For any continuous bleeding (some dried blood over the incision is normal.)    FOR EMERGENCIES:    If any unusual problems or difficulties occur during clinic hours, call [phone] or dial [phone].    Follow up with with your physician in 2-3 weeks.

## 2020-05-31 ENCOUNTER — CLINICAL SUPPORT (OUTPATIENT)
Dept: URGENT CARE | Facility: CLINIC | Age: 65
End: 2020-05-31
Payer: MEDICARE

## 2020-05-31 VITALS — HEART RATE: 88 BPM | OXYGEN SATURATION: 95 % | TEMPERATURE: 97 F

## 2020-05-31 DIAGNOSIS — Z01.818 PREOP TESTING: ICD-10-CM

## 2020-05-31 PROCEDURE — U0003 INFECTIOUS AGENT DETECTION BY NUCLEIC ACID (DNA OR RNA); SEVERE ACUTE RESPIRATORY SYNDROME CORONAVIRUS 2 (SARS-COV-2) (CORONAVIRUS DISEASE [COVID-19]), AMPLIFIED PROBE TECHNIQUE, MAKING USE OF HIGH THROUGHPUT TECHNOLOGIES AS DESCRIBED BY CMS-2020-01-R: HCPCS

## 2020-06-01 LAB — SARS-COV-2 RNA RESP QL NAA+PROBE: NOT DETECTED

## 2020-06-02 ENCOUNTER — TELEPHONE (OUTPATIENT)
Dept: DERMATOLOGY | Facility: CLINIC | Age: 65
End: 2020-06-02

## 2020-06-02 ENCOUNTER — HOSPITAL ENCOUNTER (OUTPATIENT)
Facility: HOSPITAL | Age: 65
Discharge: HOME | End: 2020-06-02
Attending: PAIN MEDICINE | Admitting: PAIN MEDICINE
Payer: MEDICARE

## 2020-06-02 VITALS
HEART RATE: 68 BPM | BODY MASS INDEX: 18.58 KG/M2 | TEMPERATURE: 99 F | HEIGHT: 62 IN | OXYGEN SATURATION: 92 % | RESPIRATION RATE: 16 BRPM | WEIGHT: 101 LBS | DIASTOLIC BLOOD PRESSURE: 74 MMHG | SYSTOLIC BLOOD PRESSURE: 171 MMHG

## 2020-06-02 DIAGNOSIS — M53.3 SACROILIAC JOINT DYSFUNCTION OF RIGHT SIDE: ICD-10-CM

## 2020-06-02 DIAGNOSIS — G89.29 CHRONIC PAIN: ICD-10-CM

## 2020-06-02 DIAGNOSIS — M46.1 SACROILIITIS: Primary | ICD-10-CM

## 2020-06-02 PROCEDURE — 99153 MOD SED SAME PHYS/QHP EA: CPT | Performed by: PAIN MEDICINE

## 2020-06-02 PROCEDURE — 25500020 PHARM REV CODE 255: Performed by: PAIN MEDICINE

## 2020-06-02 PROCEDURE — 99152 MOD SED SAME PHYS/QHP 5/>YRS: CPT | Performed by: PAIN MEDICINE

## 2020-06-02 PROCEDURE — 64636 DESTROY L/S FACET JNT ADDL: CPT | Mod: RT,,, | Performed by: PAIN MEDICINE

## 2020-06-02 PROCEDURE — A4649 SURGICAL SUPPLIES: HCPCS | Performed by: PAIN MEDICINE

## 2020-06-02 PROCEDURE — 63600175 PHARM REV CODE 636 W HCPCS: Performed by: PAIN MEDICINE

## 2020-06-02 PROCEDURE — 64635 DESTROY LUMB/SAC FACET JNT: CPT | Mod: RT,,, | Performed by: PAIN MEDICINE

## 2020-06-02 PROCEDURE — 64635 PR DESTROY LUMB/SAC FACET JNT: ICD-10-PCS | Mod: RT,,, | Performed by: PAIN MEDICINE

## 2020-06-02 PROCEDURE — 64636 DESTROY L/S FACET JNT ADDL: CPT | Mod: RT | Performed by: PAIN MEDICINE

## 2020-06-02 PROCEDURE — 64636 PR DESTROY L/S FACET JNT ADDL: ICD-10-PCS | Mod: RT,,, | Performed by: PAIN MEDICINE

## 2020-06-02 PROCEDURE — 64635 DESTROY LUMB/SAC FACET JNT: CPT | Performed by: PAIN MEDICINE

## 2020-06-02 PROCEDURE — 25000003 PHARM REV CODE 250: Performed by: PAIN MEDICINE

## 2020-06-02 RX ORDER — METHYLPREDNISOLONE ACETATE 40 MG/ML
INJECTION, SUSPENSION INTRA-ARTICULAR; INTRALESIONAL; INTRAMUSCULAR; SOFT TISSUE
Status: DISCONTINUED | OUTPATIENT
Start: 2020-06-02 | End: 2020-06-02 | Stop reason: HOSPADM

## 2020-06-02 RX ORDER — FENTANYL CITRATE 50 UG/ML
INJECTION, SOLUTION INTRAMUSCULAR; INTRAVENOUS
Status: DISCONTINUED | OUTPATIENT
Start: 2020-06-02 | End: 2020-06-02 | Stop reason: HOSPADM

## 2020-06-02 RX ORDER — SODIUM CHLORIDE 9 MG/ML
500 INJECTION, SOLUTION INTRAVENOUS CONTINUOUS
Status: DISCONTINUED | OUTPATIENT
Start: 2020-06-02 | End: 2022-05-30

## 2020-06-02 RX ORDER — INDOMETHACIN 25 MG/1
CAPSULE ORAL
Status: DISCONTINUED | OUTPATIENT
Start: 2020-06-02 | End: 2020-06-02 | Stop reason: HOSPADM

## 2020-06-02 RX ORDER — BUPIVACAINE HYDROCHLORIDE 2.5 MG/ML
INJECTION, SOLUTION EPIDURAL; INFILTRATION; INTRACAUDAL
Status: DISCONTINUED | OUTPATIENT
Start: 2020-06-02 | End: 2020-06-02 | Stop reason: HOSPADM

## 2020-06-02 RX ORDER — MIDAZOLAM HYDROCHLORIDE 1 MG/ML
INJECTION, SOLUTION INTRAMUSCULAR; INTRAVENOUS
Status: DISCONTINUED | OUTPATIENT
Start: 2020-06-02 | End: 2020-06-02 | Stop reason: HOSPADM

## 2020-06-02 RX ORDER — LIDOCAINE HYDROCHLORIDE 20 MG/ML
INJECTION, SOLUTION EPIDURAL; INFILTRATION; INTRACAUDAL; PERINEURAL
Status: DISCONTINUED | OUTPATIENT
Start: 2020-06-02 | End: 2020-06-02 | Stop reason: HOSPADM

## 2020-06-02 RX ORDER — LIDOCAINE HYDROCHLORIDE 10 MG/ML
1 INJECTION, SOLUTION EPIDURAL; INFILTRATION; INTRACAUDAL; PERINEURAL ONCE
Status: COMPLETED | OUTPATIENT
Start: 2020-06-02 | End: 2020-06-02

## 2020-06-02 NOTE — PLAN OF CARE
Discharge instruction with pain diary given and  explained, pt voiced understanding. Denies pain or discomfort at this time. Transported to car via w/c with daughter safety maintained.

## 2020-06-02 NOTE — OP NOTE
Procedure Note    Preoperative Diagnosis: Lumbosacral spondylosis  Postoperative Diagnosis: Lumbosacral spondylosis  Procedure Date: 06/02/2020  Procedure Title:  (1) Radiofrequency Ablation of Right L5 Dorsal Ramus  (2) Radiofrequency Ablation of Right Lateral Branches of S1, S2, and S3     (2) Intraoperative Fluoroscopy     (3) IV Moderate Sedation (Midazolam 2 mg, Fentanyl 50 mcg)    Kit / Equipment: iZ3D Synergy Kit with 75 mm Introducer and 2 mm Active Tip ([id number])    Anesthesia: Local    Indications: Bhavna Landry is a 64 y.o. year-old female with chronic low back pain due to sacroiliac joint dysfunction.  The patient had significant relief of the pain with the previous diagnostic nerve blocks.     Findings: Final needle placement consistent with technically sucsessful procedure.     Procedure in Detail: The patients history and physical exam were reviewed.  Informed consent was verified and a time out was performed in the presence of the patient prior to administration of IV sedation. The patient agreed to proceed.    The patient was placed in a prone position and head resting comfortably in a head ring.  Cardiopulmonary monitoring was initiated and medications to achieve IV moderate sedation were administered.  The lumbosacral area was prepped and draped in sterile fashion.    AP and ipsilateral oblique fluoroscopic views were employed to visualize the sacral ala, the location of the L5 dorsal ramus.  Skin overlying the target point was anesthetized with 2-3 mL of lidocaine 1%.  A 14 gauge styletted cannula was advanced through the anesthetized skin to contact os at the sacral ala.  Needle placement was inspected and optimized in lateral fluoroscopic views.  Motor stimulation at 2 Hz & 2 V was negative for muscle contractions in the L5 nerve distribution except for local multifidus twitch.  One mL of lidocaine 2% was injected followed by radiofrequency ablation for 2 min 30 sec with a tissue  temperature of 80° C.    The fluoroscope was adjusted to optimize visualization of the dorsal sacral foramina and the corresponding arcuate lines.  A point approximately 5 mm lateral to the dorsal foramina of S1, S2, and S3 was marked on the skin.  A 14 gauge styletted cannula was advanced through the anesthetized skin to contact os at the targeted points.  Prior to lesioning, 1 mL of lidocaine 2% was injected. Radiofrequency lesions (as detailed above) were performed semi-circumferentially at 2 locations for S1, 3 locations for S2, and 1 location for S3.    A solution consisting of 3 mL 0.25% bupivacaine and 40 mg Depomedrol was divided evenly between the locations and injected through each probe. The probes were removed with a 1% lidocaine flush.  The patients back was cleaned and bandages were placed at the needle insertion sites.    Estimated blood loss: None    Complications: None     Disposition:  The patient tolerated the procedure well and there were no apparent complications. Vital signs remained stable throughout the procedure. The patient was taken to the recovery area where written discharge instructions for the procedure were given.     Follow-up: RTC as scheduled for clinic eval or next procedure.    Michelle Pineda Jr, MD  Interventional Pain Medicine / Anesthesiology

## 2020-06-02 NOTE — DISCHARGE SUMMARY
[facility]  Discharge Note  Short Stay     Admit Date: 6/2/2020    Discharge Date: 6/2/2020     Attending Physician: Michelle Pineda Jr, MD    Diagnoses:  Active Hospital Problems    Diagnosis  POA    Chronic pain [G89.29]  Yes      Resolved Hospital Problems   No resolved problems to display.     Discharged Condition: Good     Hospital Course: Patient was admitted for an outpatient interventional pain management procedure and tolerated the procedure well with no complications.     Final Diagnoses: Same as principal problem.     Disposition: Home or Self Care     Follow up/Patient Instructions:    Follow-up Information     Michelle Pineda Jr, MD. Go in 2 weeks.    Specialty:  Pain Medicine  Why:  Post-procedural Follow Up As Scheduled  Contact information:  [address]  [address]  [place] [address] [phone]                   Reconciled Medications:     Medication List      CONTINUE taking these medications    * albuterol 1.25 mg/3 mL Nebu  Commonly known as:  ACCUNEB  TAKE 3 ML BY NEBULIZATION EVERY 6 HOURS AS NEEDED     * albuterol 90 mcg/actuation inhaler  Commonly known as:  PROAIR HFA  2 puffs qid prn     amLODIPine 5 MG tablet  Commonly known as:  NORVASC  TAKE 1 TABLET BY MOUTH EVERY DAY     atorvastatin 80 MG tablet  Commonly known as:  LIPITOR  TAKE 1 TABLET(80 MG) BY MOUTH EVERY DAY     benztropine 0.5 MG tablet  Commonly known as:  COGENTIN  Take 1 tablet (0.5 mg total) by mouth 2 (two) times daily.     diclofenac sodium 1 % Gel  Commonly known as:  VOLTAREN  Apply 2 g topically 4 (four) times daily as needed.     gabapentin 400 MG capsule  Commonly known as:  NEURONTIN  Take 1 capsule (400 mg total) by mouth every evening.     hydroCHLOROthiazide 25 MG tablet  Commonly known as:  HYDRODIURIL  TAKE 1 TABLET(25 MG) BY MOUTH EVERY DAY     HYDROcodone-acetaminophen 5-325 mg per tablet  Commonly known as:  NORCO  Take 1 tablet by mouth every 8 (eight) hours as needed for Pain.      lisinopriL 10 MG tablet  Take 10 mg by mouth daily as needed.     metoprolol succinate 25 MG 24 hr tablet  Commonly known as:  TOPROL-XL  Take 0.5 tablets (12.5 mg total) by mouth once daily.     * risperiDONE 2 MG tablet  Commonly known as:  RISPERDAL  Take 1 tablet (2 mg total) by mouth every evening.     * risperiDONE 4 MG tablet  Commonly known as:  RISPERDAL  Take 1 tablet (4 mg total) by mouth every morning.     secukinumab 150 mg/mL Pnij  Commonly known as:  COSENTYX PEN (2 PENS)  Inject 300 mg into the skin every 30 days.     sulfaSALAzine 500 MG Tbec  Commonly known as:  AZULFIDINE  TAKE 3 TABLETS BY MOUTH  TWICE A DAY     traZODone 50 MG tablet  Commonly known as:  DESYREL  Take 1 tablet (50 mg total) by mouth nightly as needed for Insomnia.     venlafaxine 150 MG Cp24  Commonly known as:  EFFEXOR-XR  Take 1 capsule (150 mg total) by mouth once daily.         * This list has 4 medication(s) that are the same as other medications prescribed for you. Read the directions carefully, and ask your doctor or other care provider to review them with you.               Discharge Procedure Orders (must include Diet, Follow-up, Activity)   Call MD for:  temperature >100.4     Call MD for:  severe uncontrolled pain     Call MD for:  redness, tenderness, or signs of infection (pain, swelling, redness, odor or green/yellow discharge around incision site)     Call MD for:  difficulty breathing or increased cough     Call MD for:  severe persistent headache     Call MD for:  worsening rash     Remove dressing in 24 hours       Michelle Pineda Jr, MD  Interventional Pain Medicine / Anesthesiology

## 2020-06-02 NOTE — TELEPHONE ENCOUNTER
----- Message from Belen Franklin LPN sent at 6/1/2020  4:57 PM CDT -----  Contact: [phone]      ----- Message -----  From: Nicole Aldana  Sent: 6/1/2020   2:33 PM CDT  To: [facility] Staff    Calling in regards to scheduling appointment for skin check. Please call

## 2020-06-04 ENCOUNTER — TELEPHONE (OUTPATIENT)
Dept: RHEUMATOLOGY | Facility: CLINIC | Age: 65
End: 2020-06-04

## 2020-06-04 ENCOUNTER — TELEPHONE (OUTPATIENT)
Dept: ORTHOPEDICS | Facility: CLINIC | Age: 65
End: 2020-06-04

## 2020-06-04 ENCOUNTER — HOSPITAL ENCOUNTER (OUTPATIENT)
Dept: RADIOLOGY | Facility: HOSPITAL | Age: 65
Discharge: HOME | End: 2020-06-04
Attending: INTERNAL MEDICINE
Payer: MEDICARE

## 2020-06-04 ENCOUNTER — PATIENT OUTREACH (OUTPATIENT)
Dept: ADMINISTRATIVE | Facility: OTHER | Age: 65
End: 2020-06-04

## 2020-06-04 ENCOUNTER — TELEPHONE (OUTPATIENT)
Dept: PSYCHIATRY | Facility: CLINIC | Age: 65
End: 2020-06-04

## 2020-06-04 ENCOUNTER — TELEPHONE (OUTPATIENT)
Dept: NEUROLOGY | Facility: CLINIC | Age: 65
End: 2020-06-04

## 2020-06-04 ENCOUNTER — PATIENT MESSAGE (OUTPATIENT)
Dept: INTERNAL MEDICINE | Facility: CLINIC | Age: 65
End: 2020-06-04

## 2020-06-04 ENCOUNTER — LAB VISIT (OUTPATIENT)
Dept: LAB | Facility: HOSPITAL | Age: 65
End: 2020-06-04
Attending: INTERNAL MEDICINE
Payer: MEDICARE

## 2020-06-04 DIAGNOSIS — M81.0 OSTEOPOROSIS, UNSPECIFIED OSTEOPOROSIS TYPE, UNSPECIFIED PATHOLOGICAL FRACTURE PRESENCE: Primary | ICD-10-CM

## 2020-06-04 DIAGNOSIS — R41.3 MEMORY LOSS: Primary | ICD-10-CM

## 2020-06-04 DIAGNOSIS — S32.401A CLOSED NONDISPLACED FRACTURE OF RIGHT ACETABULUM, UNSPECIFIED PORTION OF ACETABULUM, INITIAL ENCOUNTER: ICD-10-CM

## 2020-06-04 DIAGNOSIS — M81.0 OSTEOPOROSIS, UNSPECIFIED OSTEOPOROSIS TYPE, UNSPECIFIED PATHOLOGICAL FRACTURE PRESENCE: ICD-10-CM

## 2020-06-04 LAB
MAGNESIUM SERPL-MCNC: 1.7 MG/DL (ref 1.6–2.6)
PHOSPHATE SERPL-MCNC: 4.1 MG/DL (ref 2.7–4.5)

## 2020-06-04 PROCEDURE — 73700 CT HIP WITHOUT CONTRAST RIGHT: ICD-10-PCS | Mod: 26,RT,, | Performed by: RADIOLOGY

## 2020-06-04 PROCEDURE — 86334 IMMUNOFIX E-PHORESIS SERUM: CPT

## 2020-06-04 PROCEDURE — 83735 ASSAY OF MAGNESIUM: CPT

## 2020-06-04 PROCEDURE — 86334 IMMUNOFIX E-PHORESIS SERUM: CPT | Mod: 26,,, | Performed by: PATHOLOGY

## 2020-06-04 PROCEDURE — 86334 PATHOLOGIST INTERPRETATION IFE: ICD-10-PCS | Mod: 26,,, | Performed by: PATHOLOGY

## 2020-06-04 PROCEDURE — 83970 ASSAY OF PARATHORMONE: CPT

## 2020-06-04 PROCEDURE — 84100 ASSAY OF PHOSPHORUS: CPT

## 2020-06-04 PROCEDURE — 36415 COLL VENOUS BLD VENIPUNCTURE: CPT | Mod: PO

## 2020-06-04 PROCEDURE — 73700 CT LOWER EXTREMITY W/O DYE: CPT | Mod: TC,RT

## 2020-06-04 PROCEDURE — 73700 CT LOWER EXTREMITY W/O DYE: CPT | Mod: 26,RT,, | Performed by: RADIOLOGY

## 2020-06-04 RX ORDER — ZOLEDRONIC ACID 5 MG/100ML
5 INJECTION, SOLUTION INTRAVENOUS
Status: CANCELLED | OUTPATIENT
Start: 2020-06-04

## 2020-06-04 RX ORDER — ACETAMINOPHEN 500 MG
500 TABLET ORAL
Status: CANCELLED | OUTPATIENT
Start: 2020-06-04

## 2020-06-04 RX ORDER — SODIUM CHLORIDE 0.9 % (FLUSH) 0.9 %
10 SYRINGE (ML) INJECTION
Status: CANCELLED | OUTPATIENT
Start: 2020-06-04

## 2020-06-04 RX ORDER — HEPARIN 100 UNIT/ML
500 SYRINGE INTRAVENOUS
Status: CANCELLED | OUTPATIENT
Start: 2020-06-04

## 2020-06-04 NOTE — TELEPHONE ENCOUNTER
----- Message from Laurence Goff sent at 6/4/2020 12:19 PM CDT -----  Contact: self   Pt states that she has an order in the system regarding to her right hip she's asking for a callback to discuss this matter           contact info

## 2020-06-04 NOTE — TELEPHONE ENCOUNTER
Returned call to patient and she is confused about the CT results that she's seeing. Dr. Evans called her and explained everything again regarding the fracture. She requests that we add her right hip to her therapy. Dr. Evans discussed with Dr. Whitney as well.

## 2020-06-04 NOTE — PROGRESS NOTES
Please schedule iPTH, SIFE, Mg, Po4, DXA and then brief visit with Dr. Simental and myself to discuss Reclast for osteoporosis which she has refused in past, but now with fractures, really should proceed. Thanks YEN

## 2020-06-04 NOTE — TELEPHONE ENCOUNTER
----- Message from Peggy Whitney MD sent at 6/4/2020 12:36 PM CDT -----  Oh ok. So does she need to follow up for the right hip with you?  Peggy  ----- Message -----  From: Izzy Blackmon NP  Sent: 6/4/2020  12:29 PM CDT  To: Armando Evans MD, Mode Cornelius MD, #    I actually did see her. I asked Dr. Evans to come in and see her as well since it was a fracture and he examined her and determined that the left hip pain was related to her back. She wasn't having any right hip pain at the time at all. He wanted her to see physical therapy with a focus on stretching, so I put those orders in.    Izzy  ----- Message -----  From: Peggy Whitney MD  Sent: 6/4/2020  12:12 PM CDT  To: Izzy Blackmon NP, Mode Cornelius MD     Hi Izzy  I am covering for Dr. Cornelius.  He ordered CT of the hip on patient you saw recently and it showed hip fracture.  Can you get her into your clinic?  Thanks.  Peggy

## 2020-06-04 NOTE — TELEPHONE ENCOUNTER
CALLED  Patient and let her know of her abnormal CT scan showing stress fracture in right hip. I emailed her orthopedic NP to let her know and also asked her to reach out to orthopedic for evaluation. Patient verbalized understanding.

## 2020-06-04 NOTE — TELEPHONE ENCOUNTER
Please call patient and help her schedule Neurology referral or give her phone number to call to schedule.

## 2020-06-04 NOTE — TELEPHONE ENCOUNTER
Called and spoke with pt. Could not accept appt scheduled in July. New appt scheduled for August 18, 2020. Appt letter printed and mailed.

## 2020-06-05 DIAGNOSIS — L40.50 PSA (PSORIATIC ARTHRITIS): ICD-10-CM

## 2020-06-05 LAB
INTERPRETATION SERPL IFE-IMP: NORMAL
PATHOLOGIST INTERPRETATION IFE: NORMAL
PTH-INTACT SERPL-MCNC: 50 PG/ML (ref 9–77)

## 2020-06-06 DIAGNOSIS — L40.50 PSA (PSORIATIC ARTHRITIS): ICD-10-CM

## 2020-06-08 ENCOUNTER — NURSE TRIAGE (OUTPATIENT)
Dept: ADMINISTRATIVE | Facility: CLINIC | Age: 65
End: 2020-06-08

## 2020-06-09 ENCOUNTER — OFFICE VISIT (OUTPATIENT)
Dept: PAIN MEDICINE | Facility: CLINIC | Age: 65
End: 2020-06-09
Payer: MEDICARE

## 2020-06-09 DIAGNOSIS — G89.29 CHRONIC RIGHT-SIDED LOW BACK PAIN WITHOUT SCIATICA: ICD-10-CM

## 2020-06-09 DIAGNOSIS — M53.3 SACROILIAC JOINT DYSFUNCTION OF LEFT SIDE: Primary | ICD-10-CM

## 2020-06-09 DIAGNOSIS — M46.1 BILATERAL SACROILIITIS: ICD-10-CM

## 2020-06-09 DIAGNOSIS — G89.4 CHRONIC PAIN SYNDROME: ICD-10-CM

## 2020-06-09 DIAGNOSIS — M54.50 CHRONIC RIGHT-SIDED LOW BACK PAIN WITHOUT SCIATICA: ICD-10-CM

## 2020-06-09 PROCEDURE — 1101F PR PT FALLS ASSESS DOC 0-1 FALLS W/OUT INJ PAST YR: ICD-10-PCS | Mod: CPTII,95,, | Performed by: NURSE PRACTITIONER

## 2020-06-09 PROCEDURE — 99213 PR OFFICE/OUTPT VISIT, EST, LEVL III, 20-29 MIN: ICD-10-PCS | Mod: 95,,, | Performed by: NURSE PRACTITIONER

## 2020-06-09 PROCEDURE — 99213 OFFICE O/P EST LOW 20 MIN: CPT | Mod: 95,,, | Performed by: NURSE PRACTITIONER

## 2020-06-09 PROCEDURE — 1101F PT FALLS ASSESS-DOCD LE1/YR: CPT | Mod: CPTII,95,, | Performed by: NURSE PRACTITIONER

## 2020-06-09 PROCEDURE — 99499 RISK ADDL DX/OHS AUDIT: ICD-10-PCS | Mod: 95,,, | Performed by: NURSE PRACTITIONER

## 2020-06-09 PROCEDURE — 99499 UNLISTED E&M SERVICE: CPT | Mod: 95,,, | Performed by: NURSE PRACTITIONER

## 2020-06-09 PROCEDURE — 1159F PR MEDICATION LIST DOCUMENTED IN MEDICAL RECORD: ICD-10-PCS | Mod: 95,,, | Performed by: NURSE PRACTITIONER

## 2020-06-09 PROCEDURE — 1159F MED LIST DOCD IN RCRD: CPT | Mod: 95,,, | Performed by: NURSE PRACTITIONER

## 2020-06-09 NOTE — PROGRESS NOTES
Chronic Pain-Tele-Medicine-Established Note (Follow up visit)      The patient location is:  Home  The chief complaint leading to consultation is:  Low Back, left and  right groin,  bilateral hips and anterior thigh pain.   Visit type: Virtual visit with synchronous audio and video  Total time spent with patient: 20  Each patient to whom he or she provides medical services by telemedicine is:  (1) informed of the relationship between the physician and patient and the respective role of any other health care provider with respect to management of the patient; and (2) notified that he or she may decline to receive medical services by telemedicine and may withdraw from such care at any time.    Notes:     SUBJECTIVE:    Interval update:    06/09/2020-   64-year-old female presents status post left then right  DR 5+ lateral branches of the S1, S2 and S3 RFA with reported 100% continued  relief she reports that her pain score today is 0/10.  She reports improvement with her ADLs and overall improvement of her functionality.    05/05/2020  presents tele-medicine appointment for a follow-up appointment for low back, left groin and bilateral hip pain.  Since the last visit, Bhavna Landry states the pain has been persistant. Current pain intensity is 9/10.  Patient reports onset of pain 2 weeks, patient states bending forward and sitting for long periods of time increase her pain.  Pain is described as aching.  She reports no radicular symptoms in either leg.  Worst pain is not out of 10.  Patient is status post right and left L5 Dorsal Ramus and Lateral Branches of S1, S2, and S3 (4 levels) RFA reported 80-90% relief for a minimal 6 months.  Pain is now returned and patient like to reschedule procedure.        Pain Medications:    - Opioids:  Norco 5-325 per her PCP  - NSAIDs:  Diclofenac gel  - Anti-Depressants: venlafaxine (EFFEXOR-XR) 150 MG   - Anti-Convulsants:  Gabapentin 400 mg nightly  - Others:  Not  applicable      Physical Therapy/Home Exercise: yes, but has not attended in the last 6 months.     report:  Reviewed and consistent with medication use as prescribed.    Pain Procedures:  Bilateral SI joint injections, bilateral  Dorsal ramus block(DR5, S1,S2,S3),  bilateral L5 Dorsal Ramus and Lateral Branches of S1, S2, and S3 (4 levels) RFA      Imaging:     CT Hip Without Contrast Right  Narrative: EXAMINATION:  CT HIP WITHOUT CONTRAST RIGHT    CLINICAL HISTORY:  Fracture, hip;  Unspecified fracture of right acetabulum, initial encounter for closed fracture    TECHNIQUE:  Axial CT through the right hip without IV contrast with coronal and sagittal reformatted images generated from the axial data set.    COMPARISON:  MRI 05/19/2020    FINDINGS:  There is an extremely subtle linear area of sclerosis involving the right acetabular roof that roughly correlates with the the abnormal bone marrow edema seen on MRI and is compatible with a insufficiency type stress fracture of the right acetabular roof.  No cortical disruption is seen.  The right hip joint is located with mild diffuse joint space narrowing and extensive chondrocalcinosis of the articular cartilage of the right femoral head and neck.  Nitrogen gas phenomenon seen throughout the right SI joint with subarticular sclerosis.  Pubic symphyseal joint space narrowing subchondral cystic change, sclerosis and chondrocalcinosis noted.    The visualized pelvic soft tissues have a benign appearance.  Impression: Subchondral insufficiency stress fracture of the right acetabular roof, better seen on MRI.    Right hip arthritis and osteitis pubis with chondrocalcinosis suggesting underlying CPPD arthropathy    Osteoarthritis of the right SI joint    Electronically signed by: Amador Palomino Jr  Date:    06/04/2020  Time:    10:49         Past Medical History:   Diagnosis Date    Allergy     Amblyopia     Anemia     Anticoagulant long-term use     Arthritis  1992    Cataract     Depression     Dry eyes     Dry mouth     Duane's syndrome of right eye     Fibromyalgia 2014    Fibromyalgia     GERD (gastroesophageal reflux disease)     History of psychiatric hospitalization     Hyperlipidemia 1992    Hypertension     Kidney stone     Migraine headache     Osteoporosis     Psoriatic arthritis 1992    Right knee pain     post knee replacement surgery (possible rejectiion of metal)    RLS (restless legs syndrome)     Schizophrenia 1992    stable on meds    Urinary tract infection      Past Surgical History:   Procedure Laterality Date    brow ptosis repair Right 2019    Surgeon: Dr. Karla Henson    CATARACT EXTRACTION       SECTION      COLONOSCOPY N/A 2016    Procedure: COLONOSCOPY;  Surgeon: ANDRIA Connelly MD;  Location: [address]);  Service: Endoscopy;  Laterality: N/A;    cyst removed from right sinus  1982    HYSTERECTOMY      VAGINAL HYSTERECTOMY WITHOUT BSO - ENDOMETRIOSIS    INJECTION OF ANESTHETIC AGENT AROUND MEDIAL BRANCH NERVES INNERVATING LUMBAR FACET JOINT N/A 2019    Procedure: Lumbo-sacral Block, DR5 and Lateral Branches of S1,S2, S3;  Surgeon: Michelle Pineda Jr., MD;  Location: [facility]IN [facility];  Service: Pain Management;  Laterality: N/A;  Pt takes  and states she holds ASA on her own whenever she has procedures.  Instructed to hold x 3 days prior to procedure.      INJECTION OF ANESTHETIC AGENT INTO SACROILIAC JOINT Right 2018    Procedure: BLOCK, SACROILIAC JOINT-Right- ORAL SEDATION;  Surgeon: Michelle Pineda Jr., MD;  Location: [facility] PAIN T;  Service: Pain Management;  Laterality: Right;    INJECTION OF ANESTHETIC AGENT INTO SACROILIAC JOINT Bilateral 2018    Procedure: BLOCK, SACROILIAC JOINT-BILATERAL;  Surgeon: Michelle Pineda Jr., MD;  Location: [facility] PAIN T;  Service: Pain Management;  Laterality: Bilateral;  Please keep at 10:00  due to trasnsportation    INJECTION OF ANESTHETIC AGENT INTO SACROILIAC JOINT Bilateral 2/7/2019    Procedure: Bilateral Sacroiliac Joint Injection - Per Dr Pineda, not necessary to hold ASA.;  Surgeon: Michelle Pineda Jr., MD;  Location: [facility] PAIN MGT;  Service: Pain Management;  Laterality: Bilateral;    INTRAOCULAR PROSTHESES INSERTION Right 8/1/2019    Procedure: INSERTION, IOL PROSTHESIS;  Surgeon: Karen Song MD;  Location: [facility] OR 1ST FLR;  Service: Ophthalmology;  Laterality: Right;    INTRAOCULAR PROSTHESES INSERTION Left 9/26/2019    Procedure: INSERTION, IOL PROSTHESIS;  Surgeon: Karen Song MD;  Location: [facility] OR 1ST FLR;  Service: Ophthalmology;  Laterality: Left;    JOINT REPLACEMENT Right     knee    KNEE SURGERY      PHACOEMULSIFICATION OF CATARACT Right 8/1/2019    Procedure: PHACOEMULSIFICATION, CATARACT;  Surgeon: Karen Song MD;  [facility];  Service: Ophthalmology;  Laterality: Right;    PHACOEMULSIFICATION OF CATARACT Left 9/26/2019    Procedure: PHACOEMULSIFICATION, CATARACT;  Surgeon: Karen Song MD;  Location: [facility] OR 1ST FLR;  Service: Ophthalmology;  Laterality: Left;    RADIOFREQUENCY THERMOCOAGULATION Right 5/7/2019    Procedure: RADIOFREQUENCY THERMAL COAGULATION RIGHT DORSAL RAMUS 5 AND LATERAL BRANCH OF S1, S2 AND S3;  Surgeon: Michelle Pineda Jr., MD;  Location: [facility] PAIN T;  Service: Pain Management;  Laterality: Right;    RADIOFREQUENCY THERMOCOAGULATION Left 5/14/2019    Procedure: RADIOFREQUENCY THERMAL COAGULATION LEFT DORSAL RAMUS 5 AND LATERAL BRANCH OF S1,S2 AND S3;  Surgeon: Michelle Pineda Jr., MD;  Location: [facility] PAIN MGT;  Service: Pain Management;  Laterality: Left;    RADIOFREQUENCY THERMOCOAGULATION Right 10/22/2019    Procedure: RADIOFREQUENCY THERMAL COAGULATION---DARSAL RAMUS 5 and LATERAL S1,S2,and S3 Right;  Surgeon: Michelle Pineda Jr., MD;  Location: [facility] PAIN MGT;  Service: Pain Management;   Laterality: Right;  patient to sign consent DOS    RADIOFREQUENCY THERMOCOAGULATION Left 10/29/2019    Procedure: RADIOFREQUENCY THERMAL COAGULATION - LEFT - DR5, S1,S2, AND S3;  Surgeon: Michelle Pineda Jr., MD;  Location: [facility];  Service: Pain Management;  Laterality: Left;    RADIOFREQUENCY THERMOCOAGULATION Left 2020    Procedure: RADIOFREQUENCY THERMAL COAGULATION--Left DR5+ lateral branches of S1, S2, S3;  Surgeon: Michelle Pineda Jr., MD;  [place] [facility] [place] [facility]  Service: Pain Management;  Laterality: Left;    RADIOFREQUENCY THERMOCOAGULATION Right 2020    Procedure: RADIOFREQUENCY THERMAL COAGULATION--Right DR5+ lateral branches of S1, S2, S3;  Surgeon: Michelle Pineda Jr., MD;  Location: [facility];  Service: Pain Management;  Laterality: Right;    Surgery on right knee  1982    tumor removed from back left side upper shoulder  2006     Social History     Socioeconomic History    Marital status:      Spouse name: Not on file    Number of children: 1    Years of education: Not on file    Highest education level: Not on file   Occupational History    Occupation: retired asst  [address].     Employer: 2010   Social Needs    Financial resource strain: Not hard at all    Food insecurity:     Worry: Never true     Inability: Never true    Transportation needs:     Medical: No     Non-medical: No   Tobacco Use    Smoking status: Former Smoker     Packs/day: 0.50     Years: 10.00     Pack years: 5.00     Types: Cigarettes     Last attempt to quit: 2012     Years since quittin.0    Smokeless tobacco: Former User    Tobacco comment: stopped smoking .     Substance and Sexual Activity    Alcohol use: No     Frequency: Never    Drug use: No    Sexual activity: Never     Partners: Male     Birth control/protection: Post-menopausal   Lifestyle    Physical activity:     Days per week: Not on file     Minutes per session:  Not on file    Stress: Not on file   Relationships    Social connections:     Talks on phone: More than three times a week     Gets together: Once a week     Attends L[employer] service: Never     Active member of club or organization: Yes     Attends meetings of clubs or organizations: Never     Relationship status:    Other Topics Concern    Patient feels they ought to cut down on drinking/drug use Not Asked    Patient annoyed by others criticizing their drinking/drug use Not Asked    Patient has felt bad or guilty about drinking/drug use Not Asked    Patient has had a drink/used drugs as an eye opener in the AM Not Asked    Are you pregnant or think you may be? Not Asked    Breast-feeding Not Asked   Social History Narrative    Exercise:  Childcare.        Ett:  3 days a week        Daughter and her 3 kids live with her.         Family History   Problem Relation Age of Onset    Colon cancer Mother     Psoriasis Mother     Cancer Mother         colon    Depression Mother     Diabetes Brother     Arthritis Brother     Heart disease Brother         cad    Cancer Father         lymphoma    Stroke Sister     No Known Problems Daughter     Hypertension Neg Hx     Suicide Neg Hx     Amblyopia Neg Hx     Blindness Neg Hx     Cataracts Neg Hx     Glaucoma Neg Hx     Macular degeneration Neg Hx     Retinal detachment Neg Hx     Strabismus Neg Hx        Review of patient's allergies indicates:   Allergen Reactions    Etanercept Other (See Comments)     Other reaction(s): recurrent infections    Chloramphenicol sod succinate Hives and Other (See Comments)    Codeine Other (See Comments)     Other reaction(s): Stomach upset    Nickel sutures [surgical stainless steel] Dermatitis     Allergic contact dermatitis    Adhesive Rash     Other reaction(s): Rash       Current Outpatient Medications   Medication Sig    albuterol (ACCUNEB) 1.25 mg/3 mL Nebu TAKE 3 ML BY NEBULIZATION EVERY 6 HOURS  AS NEEDED    albuterol (PROAIR HFA) 90 mcg/actuation inhaler 2 puffs qid prn    amLODIPine (NORVASC) 5 MG tablet TAKE 1 TABLET BY MOUTH EVERY DAY    atorvastatin (LIPITOR) 80 MG tablet TAKE 1 TABLET(80 MG) BY MOUTH EVERY DAY    benztropine (COGENTIN) 0.5 MG tablet Take 1 tablet (0.5 mg total) by mouth 2 (two) times daily.    diclofenac sodium (VOLTAREN) 1 % Gel Apply 2 g topically 4 (four) times daily as needed.    gabapentin (NEURONTIN) 400 MG capsule Take 1 capsule (400 mg total) by mouth every evening.    hydroCHLOROthiazide (HYDRODIURIL) 25 MG tablet TAKE 1 TABLET(25 MG) BY MOUTH EVERY DAY    HYDROcodone-acetaminophen (NORCO) 5-325 mg per tablet Take 1 tablet by mouth every 8 (eight) hours as needed for Pain.    lisinopril 10 MG tablet Take 10 mg by mouth daily as needed.     metoprolol succinate (TOPROL-XL) 25 MG 24 hr tablet Take 0.5 tablets (12.5 mg total) by mouth once daily.    risperiDONE (RISPERDAL) 2 MG tablet Take 1 tablet (2 mg total) by mouth every evening.    risperiDONE (RISPERDAL) 4 MG tablet Take 1 tablet (4 mg total) by mouth every morning.    secukinumab (COSENTYX PEN, 2 PENS,) 150 mg/mL PnIj Inject 300 mg into the skin every 30 days.    sulfaSALAzine (AZULFIDINE) 500 MG TbEC TAKE 3 TABLETS BY MOUTH  TWICE A DAY    traZODone (DESYREL) 50 MG tablet Take 1 tablet (50 mg total) by mouth nightly as needed for Insomnia.    venlafaxine (EFFEXOR-XR) 150 MG Cp24 Take 1 capsule (150 mg total) by mouth once daily.     No current facility-administered medications for this visit.      Facility-Administered Medications Ordered in Other Visits   Medication    0.9%  NaCl infusion    0.9%  NaCl infusion    lidocaine (PF) 10 mg/ml (1%) injection 10 mg    phenylephrine HCL 2.5% ophthalmic solution 1 drop    proparacaine 0.5 % ophthalmic solution 1 drop    tropicamide 1% ophthalmic solution 1 drop       REVIEW OF SYSTEMS:    GENERAL:  No weight loss, malaise or fevers.  HEENT:   No recent  changes in vision or hearing  NECK:  Negative for lumps, no difficulty with swallowing.  RESPIRATORY:  Negative for cough, wheezing or shortness of breath, patient denies any recent URI.  CARDIOVASCULAR:  Negative for chest pain, leg swelling or palpitations.  GI:  Negative for abdominal discomfort, blood in stools or black stools or change in bowel habits.  MUSCULOSKELETAL:  See HPI.  SKIN:  Negative for lesions, rash, and itching.  PSYCH:  No mood disorder or recent psychosocial stressors.  Patients sleep is not disturbed secondary to pain.  HEMATOLOGY/LYMPHOLOGY:  Negative for prolonged bleeding, bruising easily or swollen nodes.  Patient is not currently taking any anti-coagulants  NEURO:   No history of headaches, syncope, paralysis, seizures or tremors.  All other reviewed and negative other than HPI.    OBJECTIVE:    Physical Exam  There were no vitals filed for this visit.  Ortho/SPM Exam      ASSESSMENT:  64-year-old female with chronic low back, bilateral hip and left groin pain.  The pain is nonradiating and exacerbated by prolonged sitting, standing and rolling over in bed consistent with:      Diagnosis:    1. Sacroiliac joint dysfunction of left side     2. Chronic right-sided low back pain without sciatica     3. Bilateral sacroiliitis     4. Chronic pain syndrome           PLAN:     - I have stressed the importance of physical activity and a home exercise plan to help with pain and improve health.  - Referral to Physical therapy for Lumbar stabilization, core strengthening, and a home exercise program.  - Patient can continue with medications for now since they are providing benefits, using them appropriately, and without side effects.  - Counseled patient regarding the importance of activity modification, constant sleeping habits and physical therapy.  -Consider a SI joint fusion in the future if pain returns.   The above plan and management options were discussed at length with patient. Patient is  in agreement with the above and verbalized understanding. Dr. Pineda was consulted on this patient  and agrees with this plan.      LUI Brown  Interventional Pain Management      06/09/2020     Disclaimer: This note was partly generated using dictation software which may occasionally result in transcription errors.

## 2020-06-10 DIAGNOSIS — L40.50 PSA (PSORIATIC ARTHRITIS): ICD-10-CM

## 2020-06-11 ENCOUNTER — PATIENT OUTREACH (OUTPATIENT)
Dept: OTHER | Facility: OTHER | Age: 65
End: 2020-06-11

## 2020-06-11 ENCOUNTER — PATIENT OUTREACH (OUTPATIENT)
Dept: ADMINISTRATIVE | Facility: OTHER | Age: 65
End: 2020-06-11

## 2020-06-11 NOTE — PROGRESS NOTES
Digital Medicine: Health  Introduction    Introduced Bhavna Landry to Digital Medicine. Discussed health  role and recommended lifestyle modifications.    Patient had elevated BP readings this morning due to inaccurate technique.  Reports she took her BP reading before her blood pressure medication.    The history is provided by the patient.     HYPERTENSION  Our goal is to get BP to consistently below 130/80mmHg and make the process convenient so patient can avoid extra trips to the office. Getting your blood pressure below 130/80mmHg (definition of control) will reduce your risk for heart attack, kidney failure, stroke and death (as well as kidney failure, eye disease, & dementia)      Reviewed that the Digital Medicine care team - consisting of a clinician and a health  - will follow the most current evidence-based national guidelines for treating your condition.  The health  will focus on lifestyle modifications and motivation while the clinician will focus on medication therapy.  The care team will review all data on a regular basis and reach out as needed.      Explained that one of the key parts of the program is communication with the care team.  Asked patient to respond to outreach attempts and complete questionnaires.  Stressed importance of medication adherence.    Explained that we expect patient to obtain several blood pressures per week at random times of day.  Instructed patient not to allow anyone else to use phone and monitoring device.  Confirmed appropriate BP monitoring technique.      Patient's BP goal is 130/80.Patient's BP average is 169/80 mmHg, which is above goal, per 2017 ACC/AHA Hypertension Guidelines.          Last 5 Patient Entered Readings                                      Current 30 Day Average: 169/80     Recent Readings 6/11/2020 6/11/2020 6/10/2020    SBP (mmHg) 189 195 149    DBP (mmHg) 85 84 71    Pulse 63 66 59            INTERVENTION(S)  reviewed  monitoring technique, encouragement/support and denied questions    PLAN  patient verbalizes understanding and continue monitoring      There are no preventive care reminders to display for this patient.    Reviewed the importance of self-monitoring, medication adherence, and that the health  can be used as a resource for lifestyle modifications to help reduce or maintain a healthy lifestyle.    Sent link to [facility] webpages and my contact information via miDrive for future questions. Follow up scheduled.         Screenings    SDOH

## 2020-06-11 NOTE — LETTER
June 11, 2020     Bhavna Landry  [address] [address]       Dear Bhavna,    Welcome to [facility]! Our goal is to make care effective, proactive and convenient by using data you send us from home to better treat your chronic conditions.              My name is Galen Escobedo, and I am your dedicated Digital Medicine clinician. As an expert in medication management, I will help ensure that the medications you are taking continue to provide the intended benefits and help you reach your goals. You can reach me directly at [phone] or by sending me a message directly through your [facility] account.      I am Nba Terry and I will be your health . My job is to help you identify lifestyle changes to improve your disease control. We will talk about nutrition, exercise, and other ways you may be able to adjust your current habits to better your health. Additionally, we will help ensure you are completing the tests and screenings that are necessary to help manage your conditions. You can reach me directly at [phone] or by sending me a message directly through your [facility] account.    Most importantly, YOU are at the center of this team. Together, we will work to improve your overall health and encourage you to meet your goals for a healthier lifestyle.     What we expect from YOU:  · Please take frequent home blood pressure measurements. We ask that you take at least 1 blood pressure reading per week, but more information will better help us get you know you. Be sure you rest for a few minutes before taking the reading in a quiet, comfortable place.     Be available to receive phone calls or Use It Better[employer] messages, when appropriate, from your care team. Please let us know if there are any specific days or times that work best for us to reach you via phone.     Complete routine tests and screenings. Dont worry, we will help keep you on track!           What you should expect  from your Digital Medicine Care Team:   We will work with you to create a personalized plan of care and provide you with encouragement and education, including regarding lifestyle changes, that could help you manage your disease states.     We will adjust your current medications, if needed, and continue to monitor your long-term progress.     We will provide you and your physician with monthly progress reports after you have been in the program for more than 30 days.     We will send you reminders through [employer] and text messages to help ensure you do not miss any testing deadlines to help manage your disease states.    You will be able to reach us by phone or through your [facility] account by clicking our names under Care Team on the right side of the home screen.    I look forward to working with you to achieve your blood pressure goals!    We look forward to working with you to help manage your health,    Sincerely,    Your Digital Medicine Team    Please visit our websites to learn more:   · Hypertension: [email or web address]      Remember, we are not available for emergencies. If you have an emergency, please contact your doctors office directly or call [facility] on-call ([phone] or [phone]) or 911.

## 2020-06-12 ENCOUNTER — OFFICE VISIT (OUTPATIENT)
Dept: OBSTETRICS AND GYNECOLOGY | Facility: CLINIC | Age: 65
End: 2020-06-12
Payer: MEDICARE

## 2020-06-12 VITALS
HEIGHT: 62 IN | SYSTOLIC BLOOD PRESSURE: 162 MMHG | DIASTOLIC BLOOD PRESSURE: 80 MMHG | BODY MASS INDEX: 18.83 KG/M2 | WEIGHT: 102.31 LBS

## 2020-06-12 DIAGNOSIS — R18.8 FREE FLUID IN PELVIS: ICD-10-CM

## 2020-06-12 PROCEDURE — 3077F SYST BP >= 140 MM HG: CPT | Mod: CPTII,S$GLB,, | Performed by: OBSTETRICS & GYNECOLOGY

## 2020-06-12 PROCEDURE — 3079F PR MOST RECENT DIASTOLIC BLOOD PRESSURE 80-89 MM HG: ICD-10-PCS | Mod: CPTII,S$GLB,, | Performed by: OBSTETRICS & GYNECOLOGY

## 2020-06-12 PROCEDURE — 1101F PR PT FALLS ASSESS DOC 0-1 FALLS W/OUT INJ PAST YR: ICD-10-PCS | Mod: CPTII,S$GLB,, | Performed by: OBSTETRICS & GYNECOLOGY

## 2020-06-12 PROCEDURE — 1159F PR MEDICATION LIST DOCUMENTED IN MEDICAL RECORD: ICD-10-PCS | Mod: S$GLB,,, | Performed by: OBSTETRICS & GYNECOLOGY

## 2020-06-12 PROCEDURE — 3079F DIAST BP 80-89 MM HG: CPT | Mod: CPTII,S$GLB,, | Performed by: OBSTETRICS & GYNECOLOGY

## 2020-06-12 PROCEDURE — 1101F PT FALLS ASSESS-DOCD LE1/YR: CPT | Mod: CPTII,S$GLB,, | Performed by: OBSTETRICS & GYNECOLOGY

## 2020-06-12 PROCEDURE — 99999 PR PBB SHADOW E&M-EST. PATIENT-LVL IV: ICD-10-PCS | Mod: PBBFAC,,, | Performed by: OBSTETRICS & GYNECOLOGY

## 2020-06-12 PROCEDURE — 99203 OFFICE O/P NEW LOW 30 MIN: CPT | Mod: S$GLB,,, | Performed by: OBSTETRICS & GYNECOLOGY

## 2020-06-12 PROCEDURE — 99203 PR OFFICE/OUTPT VISIT, NEW, LEVL III, 30-44 MIN: ICD-10-PCS | Mod: S$GLB,,, | Performed by: OBSTETRICS & GYNECOLOGY

## 2020-06-12 PROCEDURE — 1125F PR PAIN SEVERITY QUANTIFIED, PAIN PRESENT: ICD-10-PCS | Mod: S$GLB,,, | Performed by: OBSTETRICS & GYNECOLOGY

## 2020-06-12 PROCEDURE — 1159F MED LIST DOCD IN RCRD: CPT | Mod: S$GLB,,, | Performed by: OBSTETRICS & GYNECOLOGY

## 2020-06-12 PROCEDURE — 3077F PR MOST RECENT SYSTOLIC BLOOD PRESSURE >= 140 MM HG: ICD-10-PCS | Mod: CPTII,S$GLB,, | Performed by: OBSTETRICS & GYNECOLOGY

## 2020-06-12 PROCEDURE — 1125F AMNT PAIN NOTED PAIN PRSNT: CPT | Mod: S$GLB,,, | Performed by: OBSTETRICS & GYNECOLOGY

## 2020-06-12 PROCEDURE — 99999 PR PBB SHADOW E&M-EST. PATIENT-LVL IV: CPT | Mod: PBBFAC,,, | Performed by: OBSTETRICS & GYNECOLOGY

## 2020-06-12 NOTE — PROGRESS NOTES
"Gynecology    SUBJECTIVE:     Chief Complaint: Referral (pt states she has fluid in pelvic area per CT scan)       History of Present Illness:  64 year old who presents for follow up of "trace fluid in the pelvis" which was found with her MRI of hip done 5/19.  She does report some pelvic pain that has been present for about one month in the LLQ.  However the pain is in the left buttock, on the left hip and in the front as well.  She points primarily to the area near her hips.  No vaginal pain.  No abnormal discharge, blood, etc.  The pain comes and goes.  Reports that it is an achy pain, not sharp or stabbing.  She has had a hysterectomy, ovaries remain.  She is not sexually active.  MRI of the pelvis does not mention enlarged soft tissue masses.     Review of Systems:  Review of Systems   Constitutional: Negative for appetite change, fever and unexpected weight change.   Gastrointestinal: Negative for vomiting.   Genitourinary: Negative for pelvic pain, vaginal bleeding, vaginal discharge, vaginal pain and postmenopausal bleeding.        OBJECTIVE:     Physical Exam:  Physical Exam   Constitutional: She is oriented to person, place, and time. She appears well-developed and well-nourished.   Pulmonary/Chest: Effort normal.   Abdominal: Soft.   Genitourinary: Vagina normal. No labial fusion. There is no rash, tenderness, lesion or injury on the right labia. There is no rash, tenderness, lesion or injury on the left labia. Right adnexum displays no mass, no tenderness and no fullness. Left adnexum displays no mass, no tenderness and no fullness. No erythema, tenderness or bleeding in the vagina. No foreign body in the vagina. No signs of injury around the vagina. No vaginal discharge found.   Genitourinary Comments: Urethra: normal appearing urethra with no masses, tenderness or lesions  Urethral meatus: normal size, anterior vaginal wall with no prolapse, no lesions   Neurological: She is alert and oriented to " person, place, and time.   Psychiatric: She has a normal mood and affect. Her behavior is normal. Judgment and thought content normal.   Nursing note and vitals reviewed.      Chaperoned by: Nghia    ASSESSMENT:       ICD-10-CM ICD-9-CM    1. Free fluid in pelvis R18.8 789.59 Ambulatory referral/consult to Obstetrics / Gynecology          Plan:      Bhavna was seen today for referral.    Diagnoses and all orders for this visit:    Free fluid in pelvis  -     Ambulatory referral/consult to Obstetrics / Gynecology    - low suspicion for ovarian or gyn cause for pelvic free fluid; suspect phsyiologic and related to recent hip injury; ovaries not visualized on MRI and not palpable on exam; recent CMP normal, so liver unlikely to be a cause; no history of alcoholism;   - patient reassured    No orders of the defined types were placed in this encounter.      Follow up for annual or prn.    Kristyn Jang

## 2020-06-12 NOTE — LETTER
June 12, 2020      Mode Cornelius MD  [address]  [facility] [address]           [facility] 5th Floor  [address]  [facility] [address]  Phone: [phone]          Patient: Bhavna Landry   MR Number: [id number]   YOB: 1955   Date of Visit: 6/12/2020       Dear Dr. Mode Cornelius:    Thank you for referring Bhavna Landry to me for evaluation. Attached you will find relevant portions of my assessment and plan of care.    If you have questions, please do not hesitate to call me. I look forward to following Bhavna Landry along with you.    Sincerely,    Kristyn Jang MD    Enclosure  CC:  No Recipients    If you would like to receive this communication electronically, please contact [email or web address] or [phone] to request more information on [employer] access.    For providers and/or their staff who would like to refer a patient to [facility], please contact us through our one-stop-shop provider referral line, [facility], at [phone].    If you feel you have received this communication in error or would no longer like to receive these types of communications, please e-mail [email or web address]

## 2020-06-15 ENCOUNTER — TELEPHONE (OUTPATIENT)
Dept: INTERNAL MEDICINE | Facility: CLINIC | Age: 65
End: 2020-06-15

## 2020-06-15 RX ORDER — HYDROCODONE BITARTRATE AND ACETAMINOPHEN 5; 325 MG/1; MG/1
1 TABLET ORAL EVERY 8 HOURS PRN
Qty: 30 TABLET | Refills: 0 | Status: SHIPPED | OUTPATIENT
Start: 2020-06-15 | End: 2020-06-29 | Stop reason: SDUPTHER

## 2020-06-15 NOTE — TELEPHONE ENCOUNTER
----- Message from Marcia Reyes sent at 6/15/2020 10:07 AM CDT -----  Regarding: self [phone]  Requesting an RX refill or new RX.  Is this a refill or new RX:  new  RX name and strength: HYDROcodone-acetaminophen (NORCO) 5-325 mg per tablet  Directions (copy/paste from chart):   Take 1 tablet by mouth every 8 (eight) hours as needed for Pain  Is this a 30 day or 90 day RX:  30  Local pharmacy or mail order pharmacy:  local  Pharmacy name and phone # (copy/paste from chart):   [id number] - [place], [place] - [address] AT [facility] [address] [phone] (Phone)  [phone] (Fax)  Comments:

## 2020-06-15 NOTE — TELEPHONE ENCOUNTER
Pt wants to see if she can get in contact with Dr. Cornelius nurse before scheduling the appointment

## 2020-06-16 DIAGNOSIS — M54.9 BACK PAIN, UNSPECIFIED BACK LOCATION, UNSPECIFIED BACK PAIN LATERALITY, UNSPECIFIED CHRONICITY: Primary | ICD-10-CM

## 2020-06-18 ENCOUNTER — CLINICAL SUPPORT (OUTPATIENT)
Dept: REHABILITATION | Facility: HOSPITAL | Age: 65
End: 2020-06-18
Payer: MEDICARE

## 2020-06-18 DIAGNOSIS — G89.29 CHRONIC LEFT-SIDED LOW BACK PAIN WITHOUT SCIATICA: ICD-10-CM

## 2020-06-18 DIAGNOSIS — M54.50 CHRONIC LEFT-SIDED LOW BACK PAIN WITHOUT SCIATICA: ICD-10-CM

## 2020-06-18 DIAGNOSIS — M25.552 LEFT HIP PAIN: ICD-10-CM

## 2020-06-18 DIAGNOSIS — L40.50 PSORIATIC ARTHRITIS: ICD-10-CM

## 2020-06-18 DIAGNOSIS — M53.86 DECREASED ROM OF LUMBAR SPINE: Primary | ICD-10-CM

## 2020-06-18 DIAGNOSIS — R10.32 LEFT GROIN PAIN: ICD-10-CM

## 2020-06-18 PROCEDURE — 97110 THERAPEUTIC EXERCISES: CPT | Mod: PN

## 2020-06-18 PROCEDURE — 97163 PT EVAL HIGH COMPLEX 45 MIN: CPT | Mod: PN

## 2020-06-18 RX ORDER — SULFASALAZINE 500 MG/1
TABLET, DELAYED RELEASE ORAL
Qty: 540 TABLET | Refills: 0 | Status: SHIPPED | OUTPATIENT
Start: 2020-06-18 | End: 2020-08-19

## 2020-06-22 PROBLEM — R10.32 LEFT GROIN PAIN: Status: ACTIVE | Noted: 2020-06-22

## 2020-06-22 PROBLEM — M54.50 LEFT-SIDED LOW BACK PAIN WITHOUT SCIATICA: Status: ACTIVE | Noted: 2020-06-22

## 2020-06-22 PROBLEM — M25.552 LEFT HIP PAIN: Status: ACTIVE | Noted: 2020-06-22

## 2020-06-22 NOTE — PLAN OF CARE
[facility]  Physical Therapy Initial Evaluation    Date: 6/18/2020   Name: Bhavna Landry  Clinic Number: [id number]    Therapy Diagnosis:   Encounter Diagnoses   Name Primary?    Chronic left-sided low back pain without sciatica     Left groin pain     Left hip pain     Decreased ROM of lumbar spine Yes     Physician: Izzy Blackmon NP    Physician Orders: PT Eval and Treat   Medical Diagnosis from Referral: M54.9 (ICD-10-CM) - Back pain, unspecified back location, unspecified back pain laterality, unspecified chronicity  Evaluation Date: 6/18/2020  Authorization Period Expiration: 12/31/2020  Plan of Care Expiration: 8/21/2020  Visit # / Visits authorized: 1/12    Time In: 1:15 pm  Time Out: 2:00 pm   Total Appointment Time (timed & untimed codes): 45 minutes (1 eval - high) (1 TE)    Visit: 113.2  Total: 113.2    Precautions: Standard, Osteoporosis    Subjective   Date of onset: about 3 months ago  History of current condition - Jessica reports: Her low back has been hurting for about 3 months now. Patient reports she does not remember any specific mechanism of injury. Sitting, standing, laying down, and transitioning from sitting to standing aggravate low back pain. Low back always hurts primarily on the (L) side, (L) glute and (L) anterior hip. Biofreeze is the only thing that alleviates low back pain and all motions cause increase in pain. Pain is not worse in the morning vs at night. Patient has an extended surgical history.      Medical History:   Past Medical History:   Diagnosis Date    Allergy     Amblyopia     Anemia     Anticoagulant long-term use     Arthritis 02/02/1992    Cataract     Depression     Dry eyes     Dry mouth     Duane's syndrome of right eye     Fibromyalgia 4/17/2014    Fibromyalgia     Fractured hip     RIGHT HIP    GERD (gastroesophageal reflux disease)     History of psychiatric hospitalization     Hyperlipidemia 02/02/1992     Hypertension     Kidney stone     Migraine headache     Osteoporosis     Psoriatic arthritis 1992    Right knee pain     post knee replacement surgery (possible rejectiion of metal)    RLS (restless legs syndrome)     Schizophrenia 1992    stable on meds    Urinary tract infection        Surgical History:   Bhavna Landry  has a past surgical history that includes Surgery on right knee (1982); cyst removed from right sinus (1982); tumor removed from back left side upper shoulder (2006); Hysterectomy; Knee surgery;  section; Joint replacement (Right); Colonoscopy (N/A, 2016); Injection of anesthetic agent into sacroiliac joint (Right, 2018); Injection of anesthetic agent into sacroiliac joint (Bilateral, 2018); Injection of anesthetic agent into sacroiliac joint (Bilateral, 2019); brow ptosis repair (Right, 2019); Injection of anesthetic agent around medial branch nerves innervating lumbar facet joint (N/A, 2019); Radiofrequency thermocoagulation (Right, 2019); Radiofrequency thermocoagulation (Left, 2019); Phacoemulsification of cataract (Right, 2019); Intraocular prosthesis insertion (Right, 2019); Cataract extraction; Phacoemulsification of cataract (Left, 2019); Intraocular prosthesis insertion (Left, 2019); Radiofrequency thermocoagulation (Right, 10/22/2019); Radiofrequency thermocoagulation (Left, 10/29/2019); Radiofrequency thermocoagulation (Left, 2020); and Radiofrequency thermocoagulation (Right, 2020).    Medications:   Bhavna has a current medication list which includes the following prescription(s): albuterol, albuterol, amlodipine, atorvastatin, benztropine, diclofenac sodium, gabapentin, hydrochlorothiazide, hydrocodone-acetaminophen, lisinopril, metoprolol succinate, risperidone, risperidone, secukinumab, sulfasalazine, trazodone, and venlafaxine, and the following Facility-Administered  Medications: sodium chloride 0.9%, sodium chloride 0.9%, lidocaine (pf) 10 mg/ml (1%), phenylephrine hcl 2.5%, proparacaine, and tropicamide 1%.    Allergies:   Review of patient's allergies indicates:   Allergen Reactions    Etanercept Other (See Comments)     Other reaction(s): recurrent infections    Chloramphenicol sod succinate Hives and Other (See Comments)    Codeine Other (See Comments)     Other reaction(s): Stomach upset    Nickel sutures [surgical stainless steel] Dermatitis     Allergic contact dermatitis    Adhesive Rash     Other reaction(s): Rash        Imaging  (R) hip MRI: Osseous Structures: There is patchy focus of T2 subchondral signal of the right central acetabulum with question of corresponding linear T1 hypointensity raising concern for possible nondisplaced insufficiency type fracture.  No infiltrative marrow process.  DJD change and edema at the pubic symphysis.  Hip and Sacroiliac joints: Femoral heads are well seated.  Mild DJD change of the hips without evident full-thickness chondral defect or subcortical femoral head edema.  Probable superolateral acetabular fraying bilaterally, otherwise suboptimally evaluated given non arthrogram technique.  Trace joint fluid with possible synovitis on the right.  Mild DJD of bilateral SI joints without evident erosion, significant subchondral edema, or joint effusion.  Bursae: No trochanteric or iliopsoas bursal distension.  Soft Tissues: Minimal T2 signal at the left gluteal insertion.Otherwise, visualized pelvic myotendinous structures demonstrate no significant atrophy, edema, or mass.  Misc: Fluid distended bladder.  Trace intrapelvic free fluid.  (L) hip MRI: Osseous Structures: There is patchy focus of T2 subchondral signal of the right central acetabulum with question of corresponding linear T1 hypointensity raising concern for possible nondisplaced insufficiency type fracture.  No infiltrative marrow process.  DJD change and edema at  the pubic symphysis.  Hip and Sacroiliac joints: Femoral heads are well seated.  Mild DJD change of the hips without evident full-thickness chondral defect or subcortical femoral head edema.  Probable superolateral acetabular fraying bilaterally, otherwise suboptimally evaluated given non arthrogram technique.  Trace joint fluid with possible synovitis on the right.  Mild DJD of bilateral SI joints without evident erosion, significant subchondral edema, or joint effusion.  Bursae: No trochanteric or iliopsoas bursal distension.  Soft Tissues: Minimal T2 signal at the left gluteal insertion.Otherwise, visualized pelvic myotendinous structures demonstrate no significant atrophy, edema, or mass.  Misc: Fluid distended bladder.  Trace intrapelvic free fluid.  Pelvis MRI: Osseous Structures: There is patchy focus of T2 subchondral signal of the right central acetabulum with question of corresponding linear T1 hypointensity raising concern for possible nondisplaced insufficiency type fracture.  No infiltrative marrow process.  DJD change and edema at the pubic symphysis.  Hip and Sacroiliac joints: Femoral heads are well seated.  Mild DJD change of the hips without evident full-thickness chondral defect or subcortical femoral head edema.  Probable superolateral acetabular fraying bilaterally, otherwise suboptimally evaluated given non arthrogram technique.  Trace joint fluid with possible synovitis on the right.  Mild DJD of bilateral SI joints without evident erosion, significant subchondral edema, or joint effusion.  Bursae: No trochanteric or iliopsoas bursal distension.  Soft Tissues: Minimal T2 signal at the left gluteal insertion.Otherwise, visualized pelvic myotendinous structures demonstrate no significant atrophy, edema, or mass.  Misc: Fluid distended bladder.  Trace intrapelvic free fluid.    Prior Therapy: Yes, for her low back  Social History: lives with daughter, son and law, and their 3 kids   Occupation:  retired   Prior Level of Function: WFL  Current Level of Function: Limited    Pain:  Current: 8/10; Worst: 10/10; Best: 8/10   Location: (L) sided low back, (L) hip  Description: Aching, Dull, Tight and Sharp  Aggravating Factors: Sitting, Standing, Laying, Bending, Walking, Night Time, Morning, Extension, Flexing, Lifting and Getting out of bed/chair  Easing Factors: pain medication and ice    Pt's goals:   1. Get back to gardening without pain  Objective   Observation: Guarded    Gait:   Decreased hip EXT   Decreased step length    Posture:     Lumbar lordosis   Thoracic kyphosis    Lumbar Range of Motion:    Degrees Pain   Flexion 120   p!        Extension 20 degrees p!        Left Side Bending Limited p!        Right Side Bending Very limited p!        Left rotation   WNL p!        Right Rotation   limited p!             Lower Extremity Strength  Right LE  Left LE    Knee extension: 3+/5 Knee extension: 3+/5   Knee flexion: 3+/5 Knee flexion: 3+/5   Hip flexion: 3+/5 Hip flexion: 3+/5   Hip extension:  3+/5 Hip extension: 3+/5   Hip abduction: 3+/5 Hip abduction: 3+/5   Hip adduction: 3+/5 Hip adduction 3+/5   Ankle dorsiflexion: 4/5 Ankle dorsiflexion: 4/5   Ankle plantarflexion: 4/5 Ankle plantarflexion: 4/5         Special Tests:  - SLR: (+) bilaterally   - Repeated Flexion: (-)  - Repeated Ext: (-)    Neuro Dynamic Testing:    Sciatic nerve:      SLR:   R = (+)     L = (+)       Palpation:    (B) QL tender to touch   (L) Glute med tender to touch   (L) Piriformis tender to touch   (B) Lumbar paraspinals tender to touch    Sensation: WNL    Flexibility:   Right   Hip flexor tightness   Quadriceps tightness   Hamstrings tightness    Piriformis tightness     Left   Hip flexor tightness   Quadriceps tightness   Hamstrings tightness   Piriformis tightness       Limitation/Restriction for FOTO Lumbar Survey    Therapist reviewed FOTO scores for Bhavna Landry on 6/18/2020.   FOTO documents entered into EPIC - see  "Media section.    Limitation Score: N/A  Predicted Limitation Score: N/A  - Need to do FOTO first visit         TREATMENT   Treatment Time In: 1:40 pm  Treatment Time Out: 2:00 pm  Total Treatment time (time-based codes) separate from Evaluation: 20 minutes    Jessica received therapeutic exercises to develop strength, endurance, ROM, flexibility, posture and core stabilization for 15 minutes including:  LTR      20 x  Hamstring stretch with ankle pumps   15 x   Piriformis stretch    3 x 30  DKC      10 x 5"    Ice x 5 min   Home Exercises and Patient Education Provided    Education provided:   - HEP    Written Home Exercises Provided: yes.  Exercises were reviewed and Jessica was able to demonstrate them prior to the end of the session.  Jessica demonstrated good  understanding of the education provided.     See EMR under Media for exercises provided 6/18/2020.    Assessment   Bhavna is a 64 y.o. female referred to outpatient Physical Therapy with a medical diagnosis of (L) sided low back pain for the past 3 months. Pt presents with limited and/or painful lumbar ROM in all directions, generalized weakness of the LE, tenderness/tightness of (L) piriformis, (L) gluteus medius/maxiumus and (B) QL, and functional limitations of sitting, standing, walking, and laying down for extended periods of time. She would benefit from skilled PT to address generalized LE weakness and mobility impairments. Patient is very guarded.     Pt to be seen 2/week for 8 weeks    Pt prognosis is Fair.   Pt will benefit from skilled outpatient Physical Therapy to address the deficits stated above and in the chart below, provide pt/family education, and to maximize pt's level of independence.     Plan of care discussed with patient: Yes  Pt's spiritual, cultural and educational needs considered and patient is agreeable to the plan of care and goals as stated below:     Anticipated Barriers for therapy: Age, DJD    Medical Necessity is demonstrated " by the following  History  Co-morbidities and personal factors that may impact the plan of care Co-morbidities:   advanced age, anxiety, depression and degenerative joint disease    Personal Factors:   age  coping style  lifestyle     high   Examination  Body Structures and Functions, activity limitations and participation restrictions that may impact the plan of care Body Regions:   back  lower extremities  trunk    Body Systems:    gross symmetry  ROM  strength  gross coordinated movement  balance  gait  transfers  transitions  motor control  motor learning    Participation Restrictions:   Sitting  Standing  Walking   Laying down     Activity limitations:   Learning and applying knowledge  no deficits    General Tasks and Commands  no deficits    Communication  no deficits    Mobility  lifting and carrying objects  walking    Self care  dressing  looking after one's health    Domestic Life  doing house work (cleaning house, washing dishes, laundry)  assisting others    Interactions/Relationships  no deficits    Life Areas  no deficits    Community and Social Life  no deficits         high   Clinical Presentation unstable clinical presentation with unpredictable characteristics high   Decision Making/ Complexity Score: high     Goals:  Short Term Goals: 4 weeks  1. Patient will be independent with HEP in order to supplement pain free lumbar ROM - PROGRESSING, NOT MET  2. Pt will improve hip flexor, hamstring, and piriformis flexibility to WNL to promote functional mobility - PROGRESSING, NOT MET  3. Patient will improve Modified Oswestry Disability Index score from - % to - % limitation in order to supplement functional independence- PROGRESSING, NOT MET     Long Term Goals: 8 weeks   1. Pt will improve lumbar FOTO survey to </= - % limited in order to return to ADLs without limitation - PROGRESSING, NOT MET  2. Patient will improve LE strength from 3+/5 to a 4/5 bilaterally  for improved trunk support. -  PROGRESSING, NOT MET  3. Pt will report no pain during lumbar AROM in order to promote functional mobility - PROGRESSING, NOT MET    Plan   Plan of care Certification: 6/18/2020 to 8/21/2020.    Outpatient Physical Therapy 2 times weekly for 8 weeks to include the following interventions: Gait Training, Manual Therapy, Moist Heat/ Ice, Neuromuscular Re-ed, Patient Education, Self Care, Therapeutic Activites and Therapeutic Exercise.     Matthias Smallwood, PT

## 2020-06-23 ENCOUNTER — CLINICAL SUPPORT (OUTPATIENT)
Dept: REHABILITATION | Facility: HOSPITAL | Age: 65
End: 2020-06-23
Payer: MEDICARE

## 2020-06-23 ENCOUNTER — PATIENT OUTREACH (OUTPATIENT)
Dept: ADMINISTRATIVE | Facility: OTHER | Age: 65
End: 2020-06-23

## 2020-06-23 DIAGNOSIS — G89.29 CHRONIC LEFT-SIDED LOW BACK PAIN WITHOUT SCIATICA: ICD-10-CM

## 2020-06-23 DIAGNOSIS — M54.50 CHRONIC LEFT-SIDED LOW BACK PAIN WITHOUT SCIATICA: ICD-10-CM

## 2020-06-23 DIAGNOSIS — M25.552 LEFT HIP PAIN: ICD-10-CM

## 2020-06-23 DIAGNOSIS — R10.32 LEFT GROIN PAIN: ICD-10-CM

## 2020-06-23 PROCEDURE — 97110 THERAPEUTIC EXERCISES: CPT | Mod: PN

## 2020-06-23 NOTE — PROGRESS NOTES
"  Physical Therapy Daily Treatment Note     Name: Bhavna Landry  Clinic Number: [id number]    Therapy Diagnosis:   Encounter Diagnoses   Name Primary?    Chronic left-sided low back pain without sciatica     Left groin pain     Left hip pain      Physician: Izzy Blackmon NP    Visit Date: 6/23/2020    Physician Orders: PT Eval and Treat   Medical Diagnosis from Referral: M54.9 (ICD-10-CM) - Back pain, unspecified back location, unspecified back pain laterality, unspecified chronicity  Evaluation Date: 6/18/2020  Authorization Period Expiration: 12/31/2020  Plan of Care Expiration: 8/21/2020  Visit # / Visits authorized: 2/12     Visit: 90.96  Total: 204.16    Time In: 12:15 pm  Time Out: 1:00 pm   Total Appointment Time (timed & untimed codes): 45 minutes (3 TE)     Precautions: Standard, Osteoporosis    Subjective     Pt reports: some increased pain while doing knee to chest and bringing knee across her body. Unsure what else is causing her pain along with frequency of pain  She was compliant with home exercise program.  Response to previous treatment: left lateral hip and low back pain  Functional change: ongoing    Pain: 8/10  Location: left side low back, L lateral hip, L groin    Objective     Jessica received therapeutic exercises to develop strength, endurance, ROM, flexibility, posture and core stabilization for 45 minutes including:  LTR                                                                 20x  Hamstring stretch with ankle pumps              15x   Glut sets with hip add    2x10, 3'' holds  Hooklying hip abd    20x3'' with red TB  Bridges:     5x ! (low back pain)  L SL over towel roll    3' (towel at about T10-T12)  Piriformis stretch                                            3x30 - not done today  DKC                                                                10 x 5" - not done today  Seated thoraco-lumbar sidebending  12x B with bolster B for support     Home Exercises Provided and " Patient Education Provided   Education provided:   - HEP    Written Home Exercises Provided: yes.  Exercises were reviewed and Jessica was able to demonstrate them prior to the end of the session.  Jessica demonstrated good  understanding of the education provided.     See EMR under Media for exercises provided 6/18/2020.    Assessment     Any lumbar motion including flexion appears to increased L groin and L sided low back/hip pain, and all low level activities caused some form of discomfort. She does say the pain fluctuates with activities but always feels some pain. X-rays make no note of lesions; significant L thoraco-lumbar curve present. Poor muscle mass and decreased lumbar mobility.  Jessica is progressing well towards her goals.   Pt prognosis is Fair.     Pt will continue to benefit from skilled outpatient physical therapy to address the deficits listed in the problem list box on initial evaluation, provide pt/family education and to maximize pt's level of independence in the home and community environment.   Pt's spiritual, cultural and educational needs considered and pt agreeable to plan of care and goals.    Anticipated barriers to physical therapy: Age, DJD    Goals:   Short Term Goals: 4 weeks  1. Patient will be independent with HEP in order to supplement pain free lumbar ROM - PROGRESSING, NOT MET  2. Pt will improve hip flexor, hamstring, and piriformis flexibility to WNL to promote functional mobility - PROGRESSING, NOT MET  3. Patient will improve Modified Oswestry Disability Index score from - % to - % limitation in order to supplement functional independence- PROGRESSING, NOT MET     Long Term Goals: 8 weeks   1. Pt will improve lumbar FOTO survey to </= - % limited in order to return to ADLs without limitation - PROGRESSING, NOT MET  2. Patient will improve LE strength from 3+/5 to a 4/5 bilaterally  for improved trunk support. - PROGRESSING, NOT MET  3. Pt will report no pain during lumbar AROM  in order to promote functional mobility - PROGRESSING, NOT MET    Plan     Cont per POC and protocol.    Cal Arredondo, PT

## 2020-06-24 ENCOUNTER — PATIENT OUTREACH (OUTPATIENT)
Dept: OTHER | Facility: OTHER | Age: 65
End: 2020-06-24

## 2020-06-24 ENCOUNTER — OFFICE VISIT (OUTPATIENT)
Dept: RHEUMATOLOGY | Facility: CLINIC | Age: 65
End: 2020-06-24
Payer: MEDICARE

## 2020-06-24 ENCOUNTER — TELEPHONE (OUTPATIENT)
Dept: PHARMACY | Facility: CLINIC | Age: 65
End: 2020-06-24

## 2020-06-24 VITALS
HEIGHT: 62 IN | HEART RATE: 67 BPM | DIASTOLIC BLOOD PRESSURE: 72 MMHG | WEIGHT: 104 LBS | BODY MASS INDEX: 19.14 KG/M2 | SYSTOLIC BLOOD PRESSURE: 135 MMHG

## 2020-06-24 DIAGNOSIS — M81.0 OSTEOPOROSIS, UNSPECIFIED OSTEOPOROSIS TYPE, UNSPECIFIED PATHOLOGICAL FRACTURE PRESENCE: Primary | ICD-10-CM

## 2020-06-24 DIAGNOSIS — L40.50 PSA (PSORIATIC ARTHRITIS): ICD-10-CM

## 2020-06-24 DIAGNOSIS — I10 ESSENTIAL HYPERTENSION: Primary | Chronic | ICD-10-CM

## 2020-06-24 DIAGNOSIS — M79.7 FIBROMYALGIA: ICD-10-CM

## 2020-06-24 PROCEDURE — 99214 PR OFFICE/OUTPT VISIT, EST, LEVL IV, 30-39 MIN: ICD-10-PCS | Mod: GC,S$GLB,, | Performed by: STUDENT IN AN ORGANIZED HEALTH CARE EDUCATION/TRAINING PROGRAM

## 2020-06-24 PROCEDURE — 1101F PT FALLS ASSESS-DOCD LE1/YR: CPT | Mod: CPTII,GC,S$GLB, | Performed by: STUDENT IN AN ORGANIZED HEALTH CARE EDUCATION/TRAINING PROGRAM

## 2020-06-24 PROCEDURE — 3008F BODY MASS INDEX DOCD: CPT | Mod: CPTII,GC,S$GLB, | Performed by: STUDENT IN AN ORGANIZED HEALTH CARE EDUCATION/TRAINING PROGRAM

## 2020-06-24 PROCEDURE — 99999 PR PBB SHADOW E&M-EST. PATIENT-LVL V: ICD-10-PCS | Mod: PBBFAC,GC,, | Performed by: STUDENT IN AN ORGANIZED HEALTH CARE EDUCATION/TRAINING PROGRAM

## 2020-06-24 PROCEDURE — 1159F PR MEDICATION LIST DOCUMENTED IN MEDICAL RECORD: ICD-10-PCS | Mod: GC,S$GLB,, | Performed by: STUDENT IN AN ORGANIZED HEALTH CARE EDUCATION/TRAINING PROGRAM

## 2020-06-24 PROCEDURE — 1101F PR PT FALLS ASSESS DOC 0-1 FALLS W/OUT INJ PAST YR: ICD-10-PCS | Mod: CPTII,GC,S$GLB, | Performed by: STUDENT IN AN ORGANIZED HEALTH CARE EDUCATION/TRAINING PROGRAM

## 2020-06-24 PROCEDURE — 3078F DIAST BP <80 MM HG: CPT | Mod: CPTII,GC,S$GLB, | Performed by: STUDENT IN AN ORGANIZED HEALTH CARE EDUCATION/TRAINING PROGRAM

## 2020-06-24 PROCEDURE — 1159F MED LIST DOCD IN RCRD: CPT | Mod: GC,S$GLB,, | Performed by: STUDENT IN AN ORGANIZED HEALTH CARE EDUCATION/TRAINING PROGRAM

## 2020-06-24 PROCEDURE — 1125F AMNT PAIN NOTED PAIN PRSNT: CPT | Mod: GC,S$GLB,, | Performed by: STUDENT IN AN ORGANIZED HEALTH CARE EDUCATION/TRAINING PROGRAM

## 2020-06-24 PROCEDURE — 3078F PR MOST RECENT DIASTOLIC BLOOD PRESSURE < 80 MM HG: ICD-10-PCS | Mod: CPTII,GC,S$GLB, | Performed by: STUDENT IN AN ORGANIZED HEALTH CARE EDUCATION/TRAINING PROGRAM

## 2020-06-24 PROCEDURE — 99999 PR PBB SHADOW E&M-EST. PATIENT-LVL V: CPT | Mod: PBBFAC,GC,, | Performed by: STUDENT IN AN ORGANIZED HEALTH CARE EDUCATION/TRAINING PROGRAM

## 2020-06-24 PROCEDURE — 3075F SYST BP GE 130 - 139MM HG: CPT | Mod: CPTII,GC,S$GLB, | Performed by: STUDENT IN AN ORGANIZED HEALTH CARE EDUCATION/TRAINING PROGRAM

## 2020-06-24 PROCEDURE — 3075F PR MOST RECENT SYSTOLIC BLOOD PRESS GE 130-139MM HG: ICD-10-PCS | Mod: CPTII,GC,S$GLB, | Performed by: STUDENT IN AN ORGANIZED HEALTH CARE EDUCATION/TRAINING PROGRAM

## 2020-06-24 PROCEDURE — 3008F PR BODY MASS INDEX (BMI) DOCUMENTED: ICD-10-PCS | Mod: CPTII,GC,S$GLB, | Performed by: STUDENT IN AN ORGANIZED HEALTH CARE EDUCATION/TRAINING PROGRAM

## 2020-06-24 PROCEDURE — 1125F PR PAIN SEVERITY QUANTIFIED, PAIN PRESENT: ICD-10-PCS | Mod: GC,S$GLB,, | Performed by: STUDENT IN AN ORGANIZED HEALTH CARE EDUCATION/TRAINING PROGRAM

## 2020-06-24 PROCEDURE — 99214 OFFICE O/P EST MOD 30 MIN: CPT | Mod: GC,S$GLB,, | Performed by: STUDENT IN AN ORGANIZED HEALTH CARE EDUCATION/TRAINING PROGRAM

## 2020-06-24 ASSESSMENT — ROUTINE ASSESSMENT OF PATIENT INDEX DATA (RAPID3)
PSYCHOLOGICAL DISTRESS SCORE: 2.2
PATIENT GLOBAL ASSESSMENT SCORE: 6.5
MDHAQ FUNCTION SCORE: 0.8
WHEN YOU AWAKENED IN THE MORNING OVER THE LAST WEEK, PLEASE INDICATE THE AMOUNT OF TIME IT TAKES UNTIL YOU ARE AS LIMBER AS YOU WILL BE FOR THE DAY: 1 HR
PAIN SCORE: 7.5
TOTAL RAPID3 SCORE: 5.55
FATIGUE SCORE: 8
AM STIFFNESS SCORE: 1, YES

## 2020-06-24 NOTE — PATIENT INSTRUCTIONS
Preventing Osteoporosis: Meeting Your Calcium Needs    Your body needs calcium to build and repair bones. But it can't make calcium on its own. That's why it's important to eat calcium-rich foods. Some foods are naturally rich in calcium. Others have calcium added (fortified). It's best to get calcium from the foods you eat. But if you can't get enough, you may want to take calcium supplements. To meet your daily calcium needs, try the foods listed below.  Dairy Fish & beans Other sources      Source   Calcium (mg) per serving   Source   Calcium (mg) per serving   Source   Calcium (mg) per serving      Low-fat yogurt, plain   415 mg/8 oz.   Sardines, Atlantic, canned, with bones   351 mg/3 oz.   Oatmeal, instant, fortified   215 mg/1 cup   Nonfat milk   302 mg/1 cup   [place], sockeye, canned, with bones   239 mg/3 oz.   Tofu made with calcium sulfate   204 mg/3 oz.   Low-fat milk   297 mg/1 cup   Soybeans, fresh, boiled   131 mg/1/2 cup   Collards   179 mg/1/2 cup   Swiss cheese   272 mg/1 oz.   White beans, cooked   81 mg/1/2 cup   English muffin, whole wheat   175 mg/1 muffin   Cheddar cheese   205 mg/1 oz.   Navy beans, cooked   79 mg/1/2 cup   Kale   90 mg/1/2 cup   Ice cream strawberry   79 mg/1/2 cup           Orange, navel   56 mg/1 medium   Note: Calcium levels may vary depending on brand and size.  Daily calcium needs  14-18 years old: 1,300 mg  19-30 years old: 1,000 mg  31-50 years old: 1,000 mg  51-70 years old, women: 1,200 mg  51-70 years old, men: 1,000 mg  Pregnant or nursin-28 years old: 1,300 mg, 19-50 years old: 1,000 mg  Older than 70 (women and men): 1,200 mg   Date Last Reviewed: 10/17/2015  © 7982-8785 [employer]. [address] [place] [address], [place] [address]. All rights reserved. This information is not intended as a substitute for professional medical care. Always follow your healthcare professional's instructions.

## 2020-06-24 NOTE — TELEPHONE ENCOUNTER
DOCUMENTATION ONLY:  Prior authorization for Tymlos approved from 06/24/20 to 12/31/20    Case Id: [id number]    Co-pay: $413.09    Assistance is needed.

## 2020-06-24 NOTE — PROGRESS NOTES
"Subjective:       Patient ID: Bhavna Landry is a 64 y.o. female.    Chief Complaint: Osteoporosis    HPI     The patient is a 64 year old female with a PMH of guttate psoriasis, psoriatic arthritis, osteoporosis (zolendronic acid ordered by Dr Vargas but patient refused), OA s/p failed right TKA, lumbar spondylosis on diclofenac gel, Effexor, and gabapentin 400mg nightly, schizophrenia, and paroxysmal Afib who presents for follow up. She was last seen May 2020. She is on secukinumab 300mg monthly and sulfasalazine 1,500 mg BID. When she was last seen, she was having a lot of hip and lower back pain. An MRI of the pelvis and bilateral hips was done showing an insufficiency fracture on pelvic MRI and CT of the right hip. She has had locking of her right thumb, and pain in bilateral CMC joints. She also complains of pain in both hips, right > left. She states that she does want to get dental work done (she needs to have 5 teeth extracted to get dentures), but she cant afford the procedures.     Treatment History:   Enbrel: Discontinued in 2008 due to frequent infections    Review of Systems   Constitutional: Negative for activity change, appetite change, fatigue and fever.   HENT: Negative for mouth sores.    Eyes: Negative for redness and visual disturbance.   Respiratory: Negative for cough and shortness of breath.    Cardiovascular: Negative for chest pain, palpitations and leg swelling.   Gastrointestinal: Negative for abdominal pain, constipation, diarrhea, nausea and vomiting.   Genitourinary: Negative for dysuria.   Musculoskeletal: Positive for arthralgias. Negative for myalgias and neck pain.   Skin: Negative for rash.   Neurological: Negative for weakness, numbness and headaches.   Psychiatric/Behavioral: Negative for agitation and confusion.         Objective:   /72   Pulse 67   Ht 5' 2.4" (1.585 m)   Wt 47.2 kg (104 lb)   BMI 18.78 kg/m²      Physical Exam   Constitutional: She is " well-developed, well-nourished, and in no distress. No distress.   HENT:   Head: Normocephalic and atraumatic.   Mouth/Throat: No oropharyngeal exudate.   Eyes: Pupils are equal, round, and reactive to light. Right eye exhibits no discharge. Left eye exhibits no discharge. No scleral icterus.   Neck: Normal range of motion. Neck supple. No thyromegaly present.   Cardiovascular: Normal rate, regular rhythm and normal heart sounds.  Exam reveals no gallop and no friction rub.    No murmur heard.  Pulmonary/Chest: Effort normal and breath sounds normal.   Abdominal: Soft. Bowel sounds are normal.   Skin: Skin is warm and dry. She is not diaphoretic.     Musculoskeletal:      Comments: No small joint synovitis or large joint effusions  + Bilateral trochanteric bursitis  + Pain on internal rotation of left hip           2/3/2020   CLARK-28 (ESR) --   CLARK-28 (CRP) --   Tender (CLARK-28) 12 / 28    Swollen (CLARK-28) 4 / 28    Provider Global 20 mm   Patient Global 65 mm   ESR --   CRP --        Assessment:       1. Osteoporosis, unspecified osteoporosis type, unspecified pathological fracture presence    2. PSA (psoriatic arthritis)    3. Fibromyalgia        The patient is a 64 year old female with a PMH of guttate psoriasis, psoriatic arthritis, osteoporosis (zolendronic acid ordered by Dr Vargas but patient refused), OA s/p failed right TKA, lumbar spondylosis on diclofenac gel, Effexor, and gabapentin 400mg nightly, schizophrenia, and paroxysmal Afib who presents for follow up. She was last seen May 2020. She is on secukinumab 300mg monthly and sulfasalazine 1,500 mg BID. When she was last seen, she was having a lot of hip and lower back pain. An MRI of the pelvis and bilateral hips was done showing an insufficiency fracture on pelvic MRI and CT of the right hip. She has bilateral CMC joint osteoarthritis and osteoporosis with a hip fragility fracture. She will also be needing an invasive dental procedure soon. She is a  good candidate for Tymlos and we will request for prior authorization.     Plan:       Problem List Items Addressed This Visit        Orthopedic    PSA (psoriatic arthritis)    Fibromyalgia      Other Visit Diagnoses     Osteoporosis, unspecified osteoporosis type, unspecified pathological fracture presence    -  Primary    Relevant Medications    abaloparatide (TYMLOS) 80 mcg (3,120 mcg/1.56 mL) PnIj        - Will request for prior authorization for Tymlos, side effects including osteosarcoma in rats discussed  - Continue secukinumab 300mg monthly and sulfasalazine 1,500 mg BID  - Patient advised to take dietary calcium and vitamin D      Patient seen and discussed with Dr. Cornelius    RTC in 3 months    Yulisa Simental M.D.  Rheumatology, PGY 4

## 2020-06-25 ENCOUNTER — CLINICAL SUPPORT (OUTPATIENT)
Dept: REHABILITATION | Facility: HOSPITAL | Age: 65
End: 2020-06-25
Payer: MEDICARE

## 2020-06-25 DIAGNOSIS — R10.32 LEFT GROIN PAIN: ICD-10-CM

## 2020-06-25 DIAGNOSIS — M25.552 LEFT HIP PAIN: ICD-10-CM

## 2020-06-25 DIAGNOSIS — M54.50 CHRONIC LEFT-SIDED LOW BACK PAIN WITHOUT SCIATICA: ICD-10-CM

## 2020-06-25 DIAGNOSIS — G89.29 CHRONIC LEFT-SIDED LOW BACK PAIN WITHOUT SCIATICA: ICD-10-CM

## 2020-06-25 PROCEDURE — 97140 MANUAL THERAPY 1/> REGIONS: CPT | Mod: PN

## 2020-06-25 PROCEDURE — 97110 THERAPEUTIC EXERCISES: CPT | Mod: PN

## 2020-06-25 RX ORDER — SECUKINUMAB 150 MG/ML
300 INJECTION SUBCUTANEOUS
Qty: 6 SYRINGE | Refills: 0 | Status: SHIPPED | OUTPATIENT
Start: 2020-06-25 | End: 2020-09-03 | Stop reason: SDUPTHER

## 2020-06-25 NOTE — PROGRESS NOTES
"  Physical Therapy Daily Treatment Note     Name: Bhavna Landry  Clinic Number: [id number]    Therapy Diagnosis:   Encounter Diagnoses   Name Primary?    Chronic left-sided low back pain without sciatica     Left groin pain     Left hip pain      Physician: Izzy Blackmon NP    Visit Date: 6/25/2020    Physician Orders: PT Eval and Treat   Medical Diagnosis from Referral: M54.9 (ICD-10-CM) - Back pain, unspecified back location, unspecified back pain laterality, unspecified chronicity  Evaluation Date: 6/18/2020  Authorization Period Expiration: 12/31/2020  Plan of Care Expiration: 8/21/2020  Visit # / Visits authorized: 3/12     Visit: 88.1  Total: 292.26    Time In: 2:45 pm  Time Out: 3:25 pm   Total Appointment Time (timed & untimed codes): 40 minutes (2 TE) (1 MT)     Precautions: Standard, Osteoporosis    Subjective     Pt reports: she has been in a lot of pain and has pain during all of her exercises.   She was compliant with home exercise program.  Response to previous treatment: left lateral hip and low back pain  Functional change: ongoing    Pain: 9/10  Pain post-tx: 3/10  Location: left side low back, L lateral hip, L groin    Objective     Jessica received therapeutic exercises to develop strength, endurance, ROM, flexibility, posture and core stabilization for 25 minutes including:  Posterior pelvic tilts    5" x 20  Lumbar swiss ball roll outs   15 x  LTR                                                                 20 x  Hamstring stretch with ankle pumps              15 x   Glut sets      10" x 10  Hooklying hip abd    20 x 5'' with red TB  Sit to stand      10 x       Nae received manual therapy to L low back x 15 min to reduce pain and for soft tissue mobilization  (L) low back/glute foam rolling     Home Exercises Provided and Patient Education Provided   Education provided:   - HEP    Written Home Exercises Provided: yes.  Exercises were reviewed and Jessica was able to demonstrate them " prior to the end of the session.  Jessica demonstrated good  understanding of the education provided.     See EMR under Media for exercises provided 6/18/2020.    Assessment     Any lumbar motion including flexion appears to increase L groin and L sided low back/hip pain, and all low level activities caused some form of discomfort. She does say the pain fluctuates with activities but always feels some pain. X-rays make no note of lesions; significant L thoraco-lumbar curve present. Poor muscle mass and decreased lumbar mobility. Foam rolling to low back/L glute provided relief today and allowed for decreased pain during therapeutic exercise. Pain reported as 3/10 post-tx compared to 9/10 when walking in.   Jessica is progressing well towards her goals.   Pt prognosis is Fair.     Pt will continue to benefit from skilled outpatient physical therapy to address the deficits listed in the problem list box on initial evaluation, provide pt/family education and to maximize pt's level of independence in the home and community environment.   Pt's spiritual, cultural and educational needs considered and pt agreeable to plan of care and goals.    Anticipated barriers to physical therapy: Age, DJD    Goals:   Short Term Goals: 4 weeks  1. Patient will be independent with HEP in order to supplement pain free lumbar ROM -  MET  2. Pt will improve hip flexor, hamstring, and piriformis flexibility to WNL to promote functional mobility - PROGRESSING, NOT MET  3. Patient will improve Modified Oswestry Disability Index score from - % to - % limitation in order to supplement functional independence- PROGRESSING, NOT MET     Long Term Goals: 8 weeks   1. Pt will improve lumbar FOTO survey to </= - % limited in order to return to ADLs without limitation - PROGRESSING, NOT MET  2. Patient will improve LE strength from 3+/5 to a 4/5 bilaterally  for improved trunk support. - PROGRESSING, NOT MET  3. Pt will report no pain during lumbar AROM  in order to promote functional mobility - PROGRESSING, NOT MET    Plan     Cont per POC and protocol.    Matthias Smallwood, PT

## 2020-06-26 ENCOUNTER — TELEPHONE (OUTPATIENT)
Dept: INTERNAL MEDICINE | Facility: CLINIC | Age: 65
End: 2020-06-26

## 2020-06-26 NOTE — PROGRESS NOTES
Ave / Called to welcome to [facility] and to address an alert. lvm and asked patient to call me back. Will continue to monitor and attempt to reach patient. Will consider increasing amlodipine and possibly change lisinopril to losartan.

## 2020-06-26 NOTE — TELEPHONE ENCOUNTER
----- Message from Alina Salomon sent at 6/26/2020 11:48 AM CDT -----  Type:  Needs Medical Advice    Who Called: maribel       Would the patient rather a call back or a response via [facility]? Call back     Best Call Back Number: [phone]    Additional Information: pt would like to speak with the nurse about scheduling the antibody test.

## 2020-06-29 RX ORDER — MULTIVITAMIN
1 TABLET ORAL DAILY
COMMUNITY
End: 2024-02-23

## 2020-06-29 RX ORDER — UBIDECARENONE 75 MG
500 CAPSULE ORAL DAILY
COMMUNITY
End: 2024-02-23

## 2020-06-29 RX ORDER — MULTIVIT WITH MINERALS/HERBS
1 TABLET ORAL DAILY
COMMUNITY
End: 2022-04-25 | Stop reason: CLARIF

## 2020-06-29 RX ORDER — ASPIRIN 325 MG
325 TABLET ORAL DAILY
Status: ON HOLD | COMMUNITY
End: 2021-03-31 | Stop reason: HOSPADM

## 2020-06-29 RX ORDER — CHOLECALCIFEROL (VITAMIN D3) 25 MCG
1000 TABLET ORAL DAILY
COMMUNITY

## 2020-06-29 RX ORDER — AMLODIPINE BESYLATE 10 MG/1
10 TABLET ORAL DAILY
Qty: 90 TABLET | Refills: 1 | Status: SHIPPED | OUTPATIENT
Start: 2020-06-29 | End: 2020-12-28 | Stop reason: SDUPTHER

## 2020-06-29 RX ORDER — ASCORBIC ACID 500 MG
500 TABLET ORAL DAILY
COMMUNITY

## 2020-06-29 NOTE — PROGRESS NOTES
Digital Medicine: Clinician Introduction    Bhavna Landry is a 64 y.o. female who is newly enrolled in the Digital Medicine Clinic.    The following information was reviewed and updated:  Preferred pharmacy   [id number] - [place] - [address] AT [facility] [address]  [address]  [place] [address]  Phone: [phone] Fax: [phone]    [facility] - [address]  [address]  [facility] [address]  Phone: [phone] Fax: [phone]    PHARMACY SERVICES AT [facility] [address]  [facility] [address]  Phone: [phone] Fax: [phone]      Patient prefers a 90 days supply.     Review of patient's allergies indicates:   Allergen Reactions    Etanercept Other (See Comments)     Other reaction(s): recurrent infections    Chloramphenicol sod succinate Hives and Other (See Comments)    Codeine Other (See Comments)     Other reaction(s): Stomach upset    Nickel sutures [surgical stainless steel] Dermatitis     Allergic contact dermatitis    Adhesive Rash     Other reaction(s): Rash       Called to welcome patient to [facility] and to introduce myself. Reviewed goals of therapy, medication list/allergies, monitoring requirements and technique with patient. Reducing salt intake and tries to use sea salt to cook. She struggles with exercising because if the arthritis pain.      The history is provided by the patient.     HYPERTENSION  Our goal is to get BP to consistently below 130/80mmHg and make the process convenient so patient can avoid extra trips to the office. Getting your blood pressure below 130/80mmHg (definition of control) will reduce your risk for heart attack, kidney failure, stroke and death (as well as kidney failure, eye disease, & dementia)      Reviewed non-pharmacologic therapies and impact on BP      Explained that we expect  patient to obtain several blood pressures per week at random times of day.  Instructed patient not to allow anyone else to use phone and monitoring device.  Confirmed appropriate BP monitoring technique.      Explained to patient that the digital medicine team is not available for emergencies.  Patient will call [facility] on-call ([phone] or [phone]) or 911 if needed.    Patient's BP goal is 130/80. Patients BP average is 163/75 mmHg, which is above goal, per 2017 ACC/AHA Hypertension Guidelines.  When asked what the patient thinks is causing BP to be elevated, they state: pain      Med Review complete.    Allergies reviewed.      Last 5 Patient Entered Readings                                      Current 30 Day Average: 163/75     Recent Readings 6/27/2020 6/23/2020 6/22/2020 6/20/2020 6/19/2020    SBP (mmHg) 168 182 175 167 192    DBP (mmHg) 79 84 85 68 81    Pulse 70 59 68 61 60            INTERVENTION(S)  reviewed appropriate dose schedule, recommended diet modifications, recommended physical activity and reviewed monitoring technique    PLAN  patient verbalizes understanding, patient amenable to changes and continue monitoring    -Average BP is  mmHg which is above program goal of 130/80 mmHg.   -Patient instructed to check BP after taking medications.   -Continue current medication regimen. Increased amlodipine to 10 mg daily If BP trends up consider increasing to lisinopril 20 mg daily.  -Will continue monitoring closely and follow up as necessary.        There are no preventive care reminders to display for this patient.    Current Medication Regimen:  Hypertension Medications             amLODIPine (NORVASC) 5 MG tablet TAKE 1 TABLET BY MOUTH EVERY DAY    lisinopril 10 MG tablet Take 10 mg by mouth daily as needed.     metoprolol succinate (TOPROL-XL) 25 MG 24 hr tablet Take 0.5 tablets (12.5 mg total) by mouth once daily.            Reviewed the importance of self-monitoring, medication  adherence, and that the health  can be used as a resource for lifestyle modifications to help reduce or maintain a healthy lifestyle.    Sent link to [facility] [employer] [facility] webpages and my contact information via [employer] for future questions. Follow up scheduled.         Screenings

## 2020-06-30 ENCOUNTER — TELEPHONE (OUTPATIENT)
Dept: INTERNAL MEDICINE | Facility: CLINIC | Age: 65
End: 2020-06-30

## 2020-06-30 NOTE — TELEPHONE ENCOUNTER
----- Message from Carol Ann Godoy sent at 6/30/2020  2:52 PM CDT -----  Contact: Pt-- [phone]  Type:  RX Refill Request    Who Called:  Pt    Refill or New Rx: refill    RX Name and Strength: HYDROcodone-acetaminophen (NORCO) 5-325 mg per tablet    Preferred Pharmacy with phone number: [id number] - [place] - [address] AT [facility] [address] [phone] (Phone)  [phone] (Fax)    Would the patient rather a call back or a response via [facility]? Call    Best Call Back Number: [phone]

## 2020-06-30 NOTE — PROGRESS NOTES
"  Physical Therapy Daily Treatment Note     Name: Bhavna Landry  Clinic Number: [id number]    Therapy Diagnosis:   Encounter Diagnoses   Name Primary?    Chronic left-sided low back pain without sciatica     Left groin pain     Left hip pain      Physician: Izzy Blackmon NP    Visit Date: 7/1/2020    Physician Orders: PT Eval and Treat   Medical Diagnosis from Referral: M54.9 (ICD-10-CM) - Back pain, unspecified back location, unspecified back pain laterality, unspecified chronicity  Evaluation Date: 6/18/2020  Authorization Period Expiration: 12/31/2020  Plan of Care Expiration: 8/21/2020  Visit # / Visits authorized: 4/12     Visit: 88.10  Total:  380.36    Time In: 1:35 PM  Time Out: 2:15 PM  Total Appointment Time (timed & untimed codes): 40 minutes (2 TE) (1 MT)     Precautions: Standard, Osteoporosis    Subjective     Pt reports: she's continuing to have pain with all of her exercises. States she currently has a fractured right hip that she was told is referring pain to her low back and to her left hip. States the only thing that helps is icing for 20 minutes, but the pain comes back after 30 minutes.   She was compliant with home exercise program.  Response to previous treatment: left lateral hip and low back pain  Functional change: ongoing    Pain: 7/10  Pain post-tx: 5/10  Location: left side low back, L lateral hip, L groin    Objective     Jessica received therapeutic exercises to develop strength, endurance, ROM, flexibility, posture and core stabilization for 30 minutes including:  Posterior pelvic tilts    5" x 20  LTR                                                                 20 x  Glut sets      10" x 20  Hamstring stretch with ankle pumps              15 x   Hooklying Hip ABD    20 x 5'' with red TB  Hooklying Hip ADD    20x, 5" holds  Lumbar swiss ball roll outs   15x each direction, Forward, right, and left  Sit to stand      10x       Nae received manual therapy to L low back x 10 " "min to reduce pain and for soft tissue mobilization  (L) low back/glute foam rolling     Home Exercises Provided and Patient Education Provided   Education provided:   - HEP    Written Home Exercises Provided: yes.  Exercises were reviewed and Jessica was able to demonstrate them prior to the end of the session.  Jessica demonstrated good  understanding of the education provided.     See EMR under Media for exercises provided 6/18/2020.    Assessment     Patient presented to therapy with reports of increased pain, especially with performance of exercises. Believes the cause of all of her pain is due to her right hip being possibly fractured. Extremely hesitant and guarded with performance of all supine exercises, requiring max encouragement to complete. Requested foam roller to low back and left hip with reports of pain relief and "loosening". Patient much more inclined to perform exercises following foam roller. Showed good performance and some relief with swiss ball roll outs. Decreased pain reported at end of session from 7/10 to 5/10.   Jessica is progressing well towards her goals.   Pt prognosis is Fair.     Pt will continue to benefit from skilled outpatient physical therapy to address the deficits listed in the problem list box on initial evaluation, provide pt/family education and to maximize pt's level of independence in the home and community environment.   Pt's spiritual, cultural and educational needs considered and pt agreeable to plan of care and goals.    Anticipated barriers to physical therapy: Age, DJD    Goals:   Short Term Goals: 4 weeks  1. Patient will be independent with HEP in order to supplement pain free lumbar ROM -  MET  2. Pt will improve hip flexor, hamstring, and piriformis flexibility to WNL to promote functional mobility - PROGRESSING, NOT MET  3. Patient will improve Modified Oswestry Disability Index score from - % to - % limitation in order to supplement functional independence- " PROGRESSING, NOT MET     Long Term Goals: 8 weeks   1. Pt will improve lumbar FOTO survey to </= - % limited in order to return to ADLs without limitation - PROGRESSING, NOT MET  2. Patient will improve LE strength from 3+/5 to a 4/5 bilaterally  for improved trunk support. - PROGRESSING, NOT MET  3. Pt will report no pain during lumbar AROM in order to promote functional mobility - PROGRESSING, NOT MET    Plan     Cont per POC and protocol.    Casandra Townsend, PT

## 2020-07-01 ENCOUNTER — CLINICAL SUPPORT (OUTPATIENT)
Dept: REHABILITATION | Facility: HOSPITAL | Age: 65
End: 2020-07-01
Payer: MEDICARE

## 2020-07-01 DIAGNOSIS — M25.552 LEFT HIP PAIN: ICD-10-CM

## 2020-07-01 DIAGNOSIS — M54.50 CHRONIC LEFT-SIDED LOW BACK PAIN WITHOUT SCIATICA: ICD-10-CM

## 2020-07-01 DIAGNOSIS — G89.29 CHRONIC LEFT-SIDED LOW BACK PAIN WITHOUT SCIATICA: ICD-10-CM

## 2020-07-01 DIAGNOSIS — R10.32 LEFT GROIN PAIN: ICD-10-CM

## 2020-07-01 PROCEDURE — 97140 MANUAL THERAPY 1/> REGIONS: CPT | Mod: PN

## 2020-07-01 PROCEDURE — 97110 THERAPEUTIC EXERCISES: CPT | Mod: PN

## 2020-07-07 ENCOUNTER — CLINICAL SUPPORT (OUTPATIENT)
Dept: REHABILITATION | Facility: HOSPITAL | Age: 65
End: 2020-07-07
Payer: MEDICARE

## 2020-07-07 DIAGNOSIS — G89.29 CHRONIC LEFT-SIDED LOW BACK PAIN WITHOUT SCIATICA: ICD-10-CM

## 2020-07-07 DIAGNOSIS — M25.552 LEFT HIP PAIN: ICD-10-CM

## 2020-07-07 DIAGNOSIS — M54.50 CHRONIC LEFT-SIDED LOW BACK PAIN WITHOUT SCIATICA: ICD-10-CM

## 2020-07-07 DIAGNOSIS — R10.32 LEFT GROIN PAIN: ICD-10-CM

## 2020-07-07 PROCEDURE — 97140 MANUAL THERAPY 1/> REGIONS: CPT | Mod: PN

## 2020-07-07 PROCEDURE — 97110 THERAPEUTIC EXERCISES: CPT | Mod: PN

## 2020-07-07 NOTE — PROGRESS NOTES
"  Physical Therapy Daily Treatment Note     Name: Bhavna Landry  Clinic Number: [id number]    Therapy Diagnosis:   Encounter Diagnoses   Name Primary?    Chronic left-sided low back pain without sciatica     Left groin pain     Left hip pain      Physician: Izzy Blackmon NP    Visit Date: 7/7/2020    Physician Orders: PT Eval and Treat   Medical Diagnosis from Referral: M54.9 (ICD-10-CM) - Back pain, unspecified back location, unspecified back pain laterality, unspecified chronicity  Evaluation Date: 6/18/2020  Authorization Period Expiration: 12/31/2020  Plan of Care Expiration: 8/21/2020  Visit # / Visits authorized: 5/12     Visit: 88.10  Total:  468.46    Time In: 1:30 PM  Time Out: 2:10 PM  Total Appointment Time (timed & untimed codes): 40 minutes (2 TE) (1 MT)     Precautions: Standard, Osteoporosis    Subjective     Pt reports: her pain started being manageable yesterday but it still comes on during exercises and walking.    She was compliant with home exercise program.  Response to previous treatment: left lateral hip and low back pain  Functional change: ongoing    Pain: 6/10  Location: left side low back, L lateral hip, L groin    Objective     Jessica received therapeutic exercises to develop strength, endurance, ROM, flexibility, posture and core stabilization for 30 minutes including:  Posterior pelvic tilts    5" x 20  LTR                                                                 20 x  Glute sets      10" x 20  Hamstring stretch with ankle pumps              15 x   Hooklying Hip ABD    20 x with red TB  Hooklying Hip ADD    20x, 10" holds  Lumbar swiss ball roll outs   15x each direction, Forward, right, and left  Sit to stand with ball squeeze   2 x 10  Standing hip flexor stretch - (L) only  3 x 30"   Standing straight arm pulldown - RTB 2 x 10  Standing rows - RTB    2 x 10          Nae received manual therapy to L low back x 10 min to reduce pain and for soft tissue mobilization  (L) " low back/glute foam rolling     Home Exercises Provided and Patient Education Provided   Education provided:   - HEP    Written Home Exercises Provided: yes.  Exercises were reviewed and Jessica was able to demonstrate them prior to the end of the session.  Jessica demonstrated good  understanding of the education provided.     See EMR under Media for exercises provided 6/18/2020.    Assessment     Patient presented to therapy with reports of increased pain, especially with performance of exercises. She presents as extremely guarded and believes exercises increase pain. Patient much more inclined to perform exercises following foam roller to (L) glute and low back. Showed good performance and some relief with swiss ball roll outs. Decreased pain reported at end of session.  Jessica is progressing well towards her goals.   Pt prognosis is Fair.     Pt will continue to benefit from skilled outpatient physical therapy to address the deficits listed in the problem list box on initial evaluation, provide pt/family education and to maximize pt's level of independence in the home and community environment.   Pt's spiritual, cultural and educational needs considered and pt agreeable to plan of care and goals.    Anticipated barriers to physical therapy: Age, DJD    Goals:   Short Term Goals: 4 weeks  1. Patient will be independent with HEP in order to supplement pain free lumbar ROM -  MET  2. Pt will improve hip flexor, hamstring, and piriformis flexibility to WNL to promote functional mobility - PROGRESSING, NOT MET  3. Patient will improve Modified Oswestry Disability Index score from - % to - % limitation in order to supplement functional independence- PROGRESSING, NOT MET     Long Term Goals: 8 weeks   1. Pt will improve lumbar FOTO survey to </= - % limited in order to return to ADLs without limitation - PROGRESSING, NOT MET  2. Patient will improve LE strength from 3+/5 to a 4/5 bilaterally  for improved trunk  support. - PROGRESSING, NOT MET  3. Pt will report no pain during lumbar AROM in order to promote functional mobility - PROGRESSING, NOT MET    Plan     Cont per POC and protocol.    Matthias Smallwood, PT

## 2020-07-09 ENCOUNTER — CLINICAL SUPPORT (OUTPATIENT)
Dept: REHABILITATION | Facility: HOSPITAL | Age: 65
End: 2020-07-09
Payer: MEDICARE

## 2020-07-09 DIAGNOSIS — M25.552 LEFT HIP PAIN: ICD-10-CM

## 2020-07-09 DIAGNOSIS — R10.32 LEFT GROIN PAIN: ICD-10-CM

## 2020-07-09 DIAGNOSIS — M54.50 CHRONIC LEFT-SIDED LOW BACK PAIN WITHOUT SCIATICA: ICD-10-CM

## 2020-07-09 DIAGNOSIS — G89.29 CHRONIC LEFT-SIDED LOW BACK PAIN WITHOUT SCIATICA: ICD-10-CM

## 2020-07-09 PROCEDURE — 97110 THERAPEUTIC EXERCISES: CPT | Mod: PN

## 2020-07-09 PROCEDURE — 97140 MANUAL THERAPY 1/> REGIONS: CPT | Mod: PN

## 2020-07-09 NOTE — PROGRESS NOTES
"  Physical Therapy Daily Treatment Note     Name: Bhavna Landry  Clinic Number: [id number]    Therapy Diagnosis:   Encounter Diagnoses   Name Primary?    Chronic left-sided low back pain without sciatica     Left groin pain     Left hip pain      Physician: Izzy Blackmno NP    Visit Date: 7/9/2020    Physician Orders: PT Eval and Treat   Medical Diagnosis from Referral: M54.9 (ICD-10-CM) - Back pain, unspecified back location, unspecified back pain laterality, unspecified chronicity  Evaluation Date: 6/18/2020  Authorization Period Expiration: 12/31/2020  Plan of Care Expiration: 8/21/2020  Visit # / Visits authorized: 6/12     Visit: 88.10  Total:  556.56    Time In: 1:15 PM  Time Out: 2:00 PM  Total Appointment Time (timed & untimed codes): 40 minutes (2 TE) (1 MT)     Precautions: Standard, Osteoporosis    Subjective     Pt reports: she sat in the car earlier today and did something to bother her (L) hip. Pain eventually went down but it was hurting really bad earlier today  She was compliant with home exercise program.  Response to previous treatment: left lateral hip and low back pain  Functional change: ongoing    Pain: 6/10  Location: left side low back, L lateral hip, L groin    Objective     Jessica received therapeutic exercises to develop strength, endurance, ROM, flexibility, posture and core stabilization for 30 minutes including:  Standing:   Nu Step     5' x Level 2   Standing marching - (R) hip FLX only 20 x   SLS (B)     2 x 30"   Standing hip ABD - YTB (B)  1 x 10   Standing straight arm pulldown - RTB 2 x 10   Standing rows - RTB    2 x 10    Sit to stand with ball squeeze   2 x 10  Table:   Posterior pelvic tilts    5" x 20   LTR                                                                 20 x   Glute sets      10" x 10   Hamstring stretch with ankle pumps              15 x    Hooklying Hip ABD    20 x with red TB   Hooklying Hip ADD with ball squeeze  20 x 5"   SKTC      10" x " 5         Nae received manual therapy to L low back x 10 min to reduce pain and for soft tissue mobilization  (L) low back/glute foam rolling     Ice x 5 min     Home Exercises Provided and Patient Education Provided   Education provided:   - HEP    Written Home Exercises Provided: yes.  Exercises were reviewed and Jessica was able to demonstrate them prior to the end of the session.  Jessica demonstrated good  understanding of the education provided.     See EMR under Media for exercises provided 6/18/2020.    Assessment     Patient presented to therapy with reports of increased pain, especially with performance of exercises. She presents as extremely guarded and believes exercises increase pain. Patient much more inclined to perform exercises following foam roller to (L) glute and low back. Introduced new functional exercises today including the Nu Step, standing marching, and standing hip ABD with no issues or complaints. Decreased pain reported at end of session.  Jessica is progressing well towards her goals.   Pt prognosis is Fair.     Pt will continue to benefit from skilled outpatient physical therapy to address the deficits listed in the problem list box on initial evaluation, provide pt/family education and to maximize pt's level of independence in the home and community environment.   Pt's spiritual, cultural and educational needs considered and pt agreeable to plan of care and goals.    Anticipated barriers to physical therapy: Age, DJD    Goals:   Short Term Goals: 4 weeks  1. Patient will be independent with HEP in order to supplement pain free lumbar ROM -  MET  2. Pt will improve hip flexor, hamstring, and piriformis flexibility to WNL to promote functional mobility - PROGRESSING, NOT MET  3. Patient will improve Modified Oswestry Disability Index score from - % to - % limitation in order to supplement functional independence- PROGRESSING, NOT MET     Long Term Goals: 8 weeks   1. Pt will improve  lumbar FOTO survey to </= - % limited in order to return to ADLs without limitation - PROGRESSING, NOT MET  2. Patient will improve LE strength from 3+/5 to a 4/5 bilaterally  for improved trunk support. - PROGRESSING, NOT MET  3. Pt will report no pain during lumbar AROM in order to promote functional mobility - PROGRESSING, NOT MET    Plan     Cont per POC and protocol.    Matthias Smallwood, PT

## 2020-07-13 NOTE — TELEPHONE ENCOUNTER
----- Message from Carol Ann Godoy sent at 7/13/2020  3:38 PM CDT -----  Contact: Pt-- [phone]  Type:  RX Refill Request    Who Called:  Pt    Refill or New Rx: refill    RX Name and Strength: HYDROcodone-acetaminophen (NORCO) 5-325 mg per tablet    Preferred Pharmacy with phone number: [id number] - [place], [place] - [address] AT [facility] [address] [phone] (Phone)  [phone] (Fax)    Would the patient rather a call back or a response via [facility]? Call    Best Call Back Number: [phone]

## 2020-07-14 ENCOUNTER — PATIENT OUTREACH (OUTPATIENT)
Dept: OTHER | Facility: OTHER | Age: 65
End: 2020-07-14

## 2020-07-14 ENCOUNTER — CLINICAL SUPPORT (OUTPATIENT)
Dept: REHABILITATION | Facility: HOSPITAL | Age: 65
End: 2020-07-14
Payer: MEDICARE

## 2020-07-14 ENCOUNTER — PATIENT OUTREACH (OUTPATIENT)
Dept: ADMINISTRATIVE | Facility: OTHER | Age: 65
End: 2020-07-14

## 2020-07-14 ENCOUNTER — TELEPHONE (OUTPATIENT)
Dept: ORTHOPEDICS | Facility: CLINIC | Age: 65
End: 2020-07-14

## 2020-07-14 DIAGNOSIS — S32.401D: Primary | ICD-10-CM

## 2020-07-14 DIAGNOSIS — R10.32 LEFT GROIN PAIN: ICD-10-CM

## 2020-07-14 DIAGNOSIS — M25.552 LEFT HIP PAIN: ICD-10-CM

## 2020-07-14 DIAGNOSIS — G89.29 CHRONIC LEFT-SIDED LOW BACK PAIN WITHOUT SCIATICA: ICD-10-CM

## 2020-07-14 DIAGNOSIS — M54.50 CHRONIC LEFT-SIDED LOW BACK PAIN WITHOUT SCIATICA: ICD-10-CM

## 2020-07-14 PROCEDURE — 97110 THERAPEUTIC EXERCISES: CPT | Mod: PN

## 2020-07-14 PROCEDURE — 97140 MANUAL THERAPY 1/> REGIONS: CPT | Mod: PN

## 2020-07-14 NOTE — PROGRESS NOTES
Digital Medicine: Health  Follow-Up    Patient's BP on downward trend.  States she is feeling tired.  Reports she takes 5mg linisporil in the morning and 10mg amlodipine at night.  On medlist, it says 10mg lisinopril and 10mg of amlodipine.  Unsure if patient is confused on medications since pharmacist increased amlodipine on 6/29, but will route note to clinician about hypotensive symptoms.    The history is provided by the patient.   Follow Up  Follow-up reason(s): reading review      Readings are trending down due to drug-drug interaction.    Routine Education Topics: eating patterns and physical activity        INTERVENTION(S)  recommended diet modifications, recommend physical activity, encouragement/support and denied questions    PLAN  patient verbalizes understanding, Clinician follow-up and continue monitoring      There are no preventive care reminders to display for this patient.    Last 5 Patient Entered Readings                                      Current 30 Day Average: 150/69     Recent Readings 7/10/2020 7/4/2020 7/3/2020 7/1/2020 6/30/2020    SBP (mmHg) 116 134 105 140 128    DBP (mmHg) 48 58 57 51 69    Pulse 72 70 76 72 71                      Diet Screening       States she drinks about 1 gallon of water per day.    Physical Activity Screening   When asked if exercising, patient responded: yes    Patient participates in the following activities: physical therapy/rehab    Reports she does not feel PT is helping even after her 7th session.  States she is feeling achy.      SDOH

## 2020-07-14 NOTE — TELEPHONE ENCOUNTER
Spoke with pt.   Scheduled her to see Dr Evans at 930 am on 7/16   Pt scheduled for x-rays at 9 am

## 2020-07-14 NOTE — TELEPHONE ENCOUNTER
----- Message from Izzy Blackmon NP sent at 7/14/2020  3:11 PM CDT -----  Regarding: FW: Physical Therapy Update  Contact: PT  Naina  Dr. Evans actually came in and saw this lady in my clinic when she came in. She called back with confusion and he called her again and explained that she needed PT for stretching. He also talked to Dr. Whitney about her problem. She is now saying that she's no better and needs to be seen again. Can you please see when you can get her in with him, so that he can do another good exam and give her a treatment plan. I should have had her moved to his schedule when he saw her in my clinic, but I didn't and now I honestly don't know what else to do for her....  Izzy  ----- Message -----  From: Sylwia Fernando MA  Sent: 7/14/2020   3:03 PM CDT  To: Izzy Blackmon NP  Subject: FW: Physical Therapy Update                        ----- Message -----  From: Candi Coy  Sent: 7/14/2020   2:45 PM CDT  To: [facility] Staff  Subject: Physical Therapy Update                          PT called to report that she has been going to therapy two times weekly since the end of June and they haven't helped at all    Callback: [phone]

## 2020-07-14 NOTE — PROGRESS NOTES
"  Physical Therapy Daily Treatment Note     Name: Bhavna Landry  Clinic Number: [id number]    Therapy Diagnosis:   Encounter Diagnoses   Name Primary?    Chronic left-sided low back pain without sciatica     Left groin pain     Left hip pain      Physician: Izzy Blackmon NP    Visit Date: 7/14/2020    Physician Orders: PT Eval and Treat   Medical Diagnosis from Referral: M54.9 (ICD-10-CM) - Back pain, unspecified back location, unspecified back pain laterality, unspecified chronicity  Evaluation Date: 6/18/2020  Authorization Period Expiration: 12/31/2020  Plan of Care Expiration: 8/21/2020  Visit # / Visits authorized: 7/12     Visit: 85.24  Total:  641.8    Time In: 1:30 PM  Time Out: 2:15 PM  Total Appointment Time (timed & untimed codes): 45 minutes (1 TE) (2 MT)     Precautions: Standard, Osteoporosis    Subjective     Pt reports: she doesn't think physical therapy is helping at all. Pt reports all of the exercises hurt and she has not gotten any better.  She was compliant with home exercise program.  Response to previous treatment: left lateral hip and low back pain  Functional change: ongoing    Pain: 6/10  Location: left side low back, L lateral hip, L groin    Objective     Jessica received therapeutic exercises to develop strength, endurance, ROM, flexibility, posture and core stabilization for 20 minutes including:    Standing: NOT DONE TODAY   Nu Step     5' x Level 2   Standing marching - (R) hip FLX only  20 x   SLS (B)     2 x 30"   Standing hip ABD - YTB (B)   1 x 10   Standing straight arm pulldown - RTB 2 x 10   Standing rows - RTB    2 x 10    Sit to stand with ball squeeze   2 x 10    Table:   Posterior pelvic tilts    5" x 20   LTR                                                                 20 x   Glute sets      10" x 10   Hamstring stretch with ankle pumps              15 x    Hooklying Hip ABD iso   20 x    Hooklying Hip ADD with ball squeeze  20 x 5"            Nae received " manual therapy to L low back x 25 min to reduce pain and for soft tissue mobilization  (L) low back/glute foam rolling   Grade I-II (L) hip mobs - lateral distraction, inferior, posterior, long axis distraction     Ice x 0 min     Home Exercises Provided and Patient Education Provided   Education provided:   - HEP    Written Home Exercises Provided: yes.  Exercises were reviewed and Jessica was able to demonstrate them prior to the end of the session.  Jessica demonstrated good  understanding of the education provided.     See EMR under Media for exercises provided 6/18/2020.    Assessment     Patient presented to therapy with reports that she doesn't believe physical therapy is working. Discussed with patient about contacting MD for further treatment options. Elected to hold off on most therapeutic exercises due to reports of every exercise causing pain. Added grade I-II hip mobilizations to the (L) side with reports of significant decreases in pain in the (L) hip and low back.     Jessica is progressing well towards her goals.   Pt prognosis is Fair.     Pt will continue to benefit from skilled outpatient physical therapy to address the deficits listed in the problem list box on initial evaluation, provide pt/family education and to maximize pt's level of independence in the home and community environment.   Pt's spiritual, cultural and educational needs considered and pt agreeable to plan of care and goals.    Anticipated barriers to physical therapy: Age, DJD    Goals:   Short Term Goals: 4 weeks  1. Patient will be independent with HEP in order to supplement pain free lumbar ROM -  MET  2. Pt will improve hip flexor, hamstring, and piriformis flexibility to WNL to promote functional mobility - PROGRESSING, NOT MET     Long Term Goals: 8 weeks   1. Pt will improve lumbar FOTO survey to </= - % limited in order to return to ADLs without limitation - PROGRESSING, NOT MET  2. Patient will improve LE strength from 3+/5  to a 4/5 bilaterally  for improved trunk support. - PROGRESSING, NOT MET  3. Pt will report no pain during lumbar AROM in order to promote functional mobility - PROGRESSING, NOT MET    Plan     Cont per POC and protocol.    Matthias Smallwood, PT

## 2020-07-15 NOTE — PROGRESS NOTES
Requested updates within Care Everywhere.  Patient's chart was reviewed for overdue TARYN topics.  Immunizations reconciled.    Orders placed:n/a  Tasked appts:n/a  Labs Linked:n/a

## 2020-07-16 ENCOUNTER — HOSPITAL ENCOUNTER (OUTPATIENT)
Dept: RADIOLOGY | Facility: HOSPITAL | Age: 65
Discharge: HOME | End: 2020-07-16
Attending: ORTHOPAEDIC SURGERY
Payer: MEDICARE

## 2020-07-16 ENCOUNTER — OFFICE VISIT (OUTPATIENT)
Dept: ORTHOPEDICS | Facility: CLINIC | Age: 65
End: 2020-07-16
Payer: MEDICARE

## 2020-07-16 VITALS — WEIGHT: 104 LBS | BODY MASS INDEX: 19.14 KG/M2 | HEIGHT: 62 IN

## 2020-07-16 DIAGNOSIS — S32.401D: ICD-10-CM

## 2020-07-16 DIAGNOSIS — M81.0 OSTEOPOROSIS, UNSPECIFIED OSTEOPOROSIS TYPE, UNSPECIFIED PATHOLOGICAL FRACTURE PRESENCE: Primary | ICD-10-CM

## 2020-07-16 DIAGNOSIS — S32.484D CLOSED NONDISPLACED DOME FRACTURE OF RIGHT ACETABULUM WITH ROUTINE HEALING, SUBSEQUENT ENCOUNTER: ICD-10-CM

## 2020-07-16 DIAGNOSIS — S76.012A MUSCLE STRAIN OF LEFT GLUTEAL REGION, INITIAL ENCOUNTER: ICD-10-CM

## 2020-07-16 PROCEDURE — 1101F PT FALLS ASSESS-DOCD LE1/YR: CPT | Mod: CPTII,S$GLB,, | Performed by: ORTHOPAEDIC SURGERY

## 2020-07-16 PROCEDURE — 72170 X-RAY EXAM OF PELVIS: CPT | Mod: 26,,, | Performed by: RADIOLOGY

## 2020-07-16 PROCEDURE — 1126F AMNT PAIN NOTED NONE PRSNT: CPT | Mod: S$GLB,,, | Performed by: ORTHOPAEDIC SURGERY

## 2020-07-16 PROCEDURE — 3008F BODY MASS INDEX DOCD: CPT | Mod: CPTII,S$GLB,, | Performed by: ORTHOPAEDIC SURGERY

## 2020-07-16 PROCEDURE — 99999 PR PBB SHADOW E&M-EST. PATIENT-LVL IV: CPT | Mod: PBBFAC,,, | Performed by: ORTHOPAEDIC SURGERY

## 2020-07-16 PROCEDURE — 72170 XR PELVIS JUDET VIEWS: ICD-10-PCS | Mod: 26,,, | Performed by: RADIOLOGY

## 2020-07-16 PROCEDURE — 72170 X-RAY EXAM OF PELVIS: CPT | Mod: TC

## 2020-07-16 PROCEDURE — 3008F PR BODY MASS INDEX (BMI) DOCUMENTED: ICD-10-PCS | Mod: CPTII,S$GLB,, | Performed by: ORTHOPAEDIC SURGERY

## 2020-07-16 PROCEDURE — 1159F MED LIST DOCD IN RCRD: CPT | Mod: S$GLB,,, | Performed by: ORTHOPAEDIC SURGERY

## 2020-07-16 PROCEDURE — 1126F PR PAIN SEVERITY QUANTIFIED, NO PAIN PRESENT: ICD-10-PCS | Mod: S$GLB,,, | Performed by: ORTHOPAEDIC SURGERY

## 2020-07-16 PROCEDURE — 1101F PR PT FALLS ASSESS DOC 0-1 FALLS W/OUT INJ PAST YR: ICD-10-PCS | Mod: CPTII,S$GLB,, | Performed by: ORTHOPAEDIC SURGERY

## 2020-07-16 PROCEDURE — 99214 OFFICE O/P EST MOD 30 MIN: CPT | Mod: S$GLB,,, | Performed by: ORTHOPAEDIC SURGERY

## 2020-07-16 PROCEDURE — 1159F PR MEDICATION LIST DOCUMENTED IN MEDICAL RECORD: ICD-10-PCS | Mod: S$GLB,,, | Performed by: ORTHOPAEDIC SURGERY

## 2020-07-16 PROCEDURE — 99214 PR OFFICE/OUTPT VISIT, EST, LEVL IV, 30-39 MIN: ICD-10-PCS | Mod: S$GLB,,, | Performed by: ORTHOPAEDIC SURGERY

## 2020-07-16 PROCEDURE — 99999 PR PBB SHADOW E&M-EST. PATIENT-LVL IV: ICD-10-PCS | Mod: PBBFAC,,, | Performed by: ORTHOPAEDIC SURGERY

## 2020-07-16 RX ORDER — PETROLATUM,WHITE/LANOLIN
1 OINTMENT (GRAM) TOPICAL 2 TIMES DAILY PRN
Qty: 30 EACH | Refills: 0 | COMMUNITY
Start: 2020-07-16 | End: 2021-04-29

## 2020-07-16 RX ORDER — SULFASALAZINE 500 MG/1
TABLET, DELAYED RELEASE ORAL
COMMUNITY
Start: 2020-06-24 | End: 2020-07-16

## 2020-07-16 NOTE — PROGRESS NOTES
HPI: 64-year-old female with osteoporosis had some right hip pain beginning in the 1st week of May 2020.  She had an MRI of her pelvis which showed a very mild increase in signal on T1 and T2 in the anterior column to the anterior portion of the dome.  A CT scan was also ordered and is very difficult to see anything on the CT scan in that area although there may be a small cortical defect.  She has been weight-bearing as tolerated prior to and since that point time.  She is now about 2 months out from when she was originally seen in the joint clinic.  She has been taking Tymlos since June which is in anabolic osteoporotic medication.      Interval Hx 7/16/20:  Today she presents with continued pain in the left hip, back.  States that she has been active in therapy for 2 months has been doing stretching, strengthening exercises.  She points to the buttocks and states that the pain does not radiate down the leg.  The pain is throbbing in nature.  States that she is frustrated and cannot do certain things like bending down, sitting for long periods as this does bother her.  She takes occasional aspirin 325 for the pain.    ROS:  Patient denies constitutional symptoms, cardiac symptoms, respiratory symptoms, GI symptoms.  The remainder of the musculoskeletal ROS is included in the HPI.    PE:    AA&O x 4.  NAD  HEENT:  NCAT, sclera nonicteric  Lungs:  Respirations are equal and unlabored.  CV:  2+ bilateral upper and lower extremity pulses.  Skin:  Intact throughout.    MS:      No visible deformity, leg lengths are equal and symmetric.  Minimal tenderness to palpation over the groin, he is exquisitely tender over the gluteus medius and part gluteus araseli as well as the short external rotators and posterior hip.  Negative HELIO.  Pain in the abductors with resisted internal rotation and adduction.  Full range of motion at the hip  Iliopsoas nontender with flexion/extension.  Neurovascularly intact distal.    Rads:   X-ray, Judet views of pelvis today showing no acute bony abnormality.    A/P:  At this time the patient is having continued left hip pain despite 2 months of conservative management with physical therapy.  Her pain seems to be largely in the area of the buttocks in the abductor and short external rotator region.  She is going to continue to work on her stretching.  I have shown her several different over-the-counter massage devices that she can use in this area to in have recommended a foam roller as well as the stickwhich I think work well in that area.  We also went over some distinct stretching exercises for that region in clinic today.  The right hip pain in the area of the MRI diagnosed anterior column abnormality from several months ago has completely resolved.

## 2020-07-17 ENCOUNTER — PATIENT OUTREACH (OUTPATIENT)
Dept: OTHER | Facility: OTHER | Age: 65
End: 2020-07-17

## 2020-07-17 DIAGNOSIS — I10 ESSENTIAL HYPERTENSION: Primary | Chronic | ICD-10-CM

## 2020-07-17 RX ORDER — CLONIDINE HYDROCHLORIDE 0.1 MG/1
0.1 TABLET ORAL 2 TIMES DAILY PRN
Qty: 60 TABLET | Refills: 0 | Status: SHIPPED | OUTPATIENT
Start: 2020-07-17 | End: 2020-07-20

## 2020-07-17 RX ORDER — HYDROCODONE BITARTRATE AND ACETAMINOPHEN 5; 325 MG/1; MG/1
1 TABLET ORAL EVERY 8 HOURS PRN
Qty: 30 TABLET | Refills: 0 | Status: SHIPPED | OUTPATIENT
Start: 2020-07-17 | End: 2020-12-21 | Stop reason: SDUPTHER

## 2020-07-17 NOTE — PROGRESS NOTES
Digital Medicine: Clinician Follow-Up    Called to follow up on medication changes. When she is resting BP is close to goal or below goal but she reports that she is in pain consistently. Has been going for pain management for sciatica. States she is in pain currently. States it is not managed by PT so sometimes her BP is elevated because of it. She also states she had been living with her daughter and son in law who stress her out and make her angry.     The history is provided by the patient.   Follow-up reason(s): medication change follow-up and returning patient call.     Readings are trending down   due to medication adherence.       Patient is not experiencing signs/symptoms of hypotension.    Patient did make medication change.    Is patient tolerating med change?: yes      Additional Follow-up details: Instructed her not to check her BP when she is angry as this gives the impression that her BP is elevated because medications are not working when she should be checking when she is calm and rested.      Last 5 Patient Entered Readings                                      Current 30 Day Average: 154/72     Recent Readings 7/17/2020 7/15/2020 7/10/2020 7/4/2020 7/3/2020    SBP (mmHg) 182 178 116 134 105    DBP (mmHg) 79 86 48 58 57    Pulse 63 61 72 70 76               Depression Screening  Bhavna Landry screened negative on the depression screening.     Sleep Apnea Screening  Patient not previously diagnosed with ROMAN and       Medication Affordability Screening  Patient did not answer the medication affordability questionnaires. Patient is currently not having problems affording medications    Medication Adherence-Medication adherence was assessed.          ASSESSMENT(S)  Patients BP average is 154/72 mmHg, which is above goal. Patient's BP goal is less than or equal to 130/80 per 2017 ACC/AHA Hypertension Guidelines.       PLAN  Medication change: Added clonidine 0.1mg twice daily prn for when her systolic  BP is above 160 mmHg and diastolic is above 100 mmHg  Continue current therapy: as below  Continue current diet/physical activity routine: DASH diet and up to 30minutes of activity that raises heat rate  Await resolution of current disease process: pain  Await MD intervention: Patient states she is in pain and PCP was notified. as this could be a reason for elevated BP.    Patient verbalizes understanding. Patient did not express questions or concerns and patient has contact information if needed.      Explained to the patient that the Digital Medicine team is not available for emergencies. Advised patient call [facility] ([phone] or [phone]) or [phone] if needed.         There are no preventive care reminders to display for this patient.      Hypertension Medications             amLODIPine (NORVASC) 10 MG tablet Take 1 tablet (10 mg total) by mouth once daily.    lisinopril 10 MG tablet Take 10 mg by mouth daily as needed.     metoprolol succinate (TOPROL-XL) 25 MG 24 hr tablet Take 0.5 tablets (12.5 mg total) by mouth once daily.

## 2020-07-20 ENCOUNTER — TELEPHONE (OUTPATIENT)
Dept: INTERNAL MEDICINE | Facility: CLINIC | Age: 65
End: 2020-07-20

## 2020-07-20 NOTE — TELEPHONE ENCOUNTER
----- Message from Maru Guzman sent at 7/20/2020  3:40 PM CDT -----  Regarding: Pt self Mobile/Home [phone]  Patient would like a call back in regards to her wanting to know if she should take her cloNIDine (CATAPRES) 0.1 MG tablet pills when she need them with her regular blood pressure pills?

## 2020-07-21 RX ORDER — AMOXICILLIN 875 MG/1
875 TABLET, FILM COATED ORAL 2 TIMES DAILY
Qty: 14 TABLET | Refills: 0 | Status: SHIPPED | OUTPATIENT
Start: 2020-07-21 | End: 2020-08-22 | Stop reason: CLARIF

## 2020-07-21 NOTE — TELEPHONE ENCOUNTER
Spoke with patient and inforrmed her that amoxi was sent to her pharmacy and to give us a call if it dies not help. Patient states she understand

## 2020-07-21 NOTE — TELEPHONE ENCOUNTER
Notified pt of message below.   Pt says her sinuses is draining into her throat and her throat is very sore. She does have a cough. No fever, no SOB, or any other symptoms. Pt has been taking benadryl with no relief. Please advise

## 2020-07-28 ENCOUNTER — OFFICE VISIT (OUTPATIENT)
Dept: PSYCHIATRY | Facility: CLINIC | Age: 65
End: 2020-07-28
Payer: MEDICARE

## 2020-07-28 DIAGNOSIS — G47.00 INSOMNIA, UNSPECIFIED TYPE: ICD-10-CM

## 2020-07-28 DIAGNOSIS — F33.42 RECURRENT MAJOR DEPRESSIVE DISORDER, IN FULL REMISSION: ICD-10-CM

## 2020-07-28 DIAGNOSIS — F41.9 ANXIETY: Primary | ICD-10-CM

## 2020-07-28 DIAGNOSIS — F20.0 PARANOID SCHIZOPHRENIA: ICD-10-CM

## 2020-07-28 DIAGNOSIS — G89.4 CHRONIC PAIN SYNDROME: ICD-10-CM

## 2020-07-28 PROCEDURE — 99214 OFFICE O/P EST MOD 30 MIN: CPT | Mod: S$GLB,,, | Performed by: INTERNAL MEDICINE

## 2020-07-28 PROCEDURE — 99214 PR OFFICE/OUTPT VISIT, EST, LEVL IV, 30-39 MIN: ICD-10-PCS | Mod: S$GLB,,, | Performed by: INTERNAL MEDICINE

## 2020-07-28 PROCEDURE — 1101F PR PT FALLS ASSESS DOC 0-1 FALLS W/OUT INJ PAST YR: ICD-10-PCS | Mod: CPTII,S$GLB,, | Performed by: INTERNAL MEDICINE

## 2020-07-28 PROCEDURE — 1159F PR MEDICATION LIST DOCUMENTED IN MEDICAL RECORD: ICD-10-PCS | Mod: S$GLB,,, | Performed by: INTERNAL MEDICINE

## 2020-07-28 PROCEDURE — 99999 PR PBB SHADOW E&M-EST. PATIENT-LVL II: CPT | Mod: PBBFAC,,, | Performed by: INTERNAL MEDICINE

## 2020-07-28 PROCEDURE — 99999 PR PBB SHADOW E&M-EST. PATIENT-LVL II: ICD-10-PCS | Mod: PBBFAC,,, | Performed by: INTERNAL MEDICINE

## 2020-07-28 PROCEDURE — 1159F MED LIST DOCD IN RCRD: CPT | Mod: S$GLB,,, | Performed by: INTERNAL MEDICINE

## 2020-07-28 PROCEDURE — 1101F PT FALLS ASSESS-DOCD LE1/YR: CPT | Mod: CPTII,S$GLB,, | Performed by: INTERNAL MEDICINE

## 2020-07-28 RX ORDER — GABAPENTIN 100 MG/1
CAPSULE ORAL
Qty: 180 CAPSULE | Refills: 1 | Status: SHIPPED | OUTPATIENT
Start: 2020-07-28 | End: 2021-05-05

## 2020-07-28 RX ORDER — GABAPENTIN 400 MG/1
400 CAPSULE ORAL NIGHTLY
Qty: 90 CAPSULE | Refills: 1 | Status: SHIPPED | OUTPATIENT
Start: 2020-07-28 | End: 2021-04-28 | Stop reason: SDUPTHER

## 2020-07-28 NOTE — PROGRESS NOTES
"OUTPATIENT PSYCHIATRY RETURN VISIT    ENCOUNTER DATE:  7/29/2020  SITE:  [facility], [facility]  LENGTH OF SESSION:  20 minutes    CHIEF COMPLAINT:  Anxiety      HISTORY OF PRESENTING ILLNESS:  Bhavna Landry is a 65 y.o. female with history of Paranoid Schizophrenia, Depression, and Insomnia who presents for follow up appointment.      Plan at last appointment on 4/29/2020:  · Psychosis improved now that patient is taking medication consistently.  Continue Risperdal 4mg/2mg and Cogentin 0.5mg BID.  · Continue Effexor 150mg daily for depression.  · Continue Trazodone 50mg qHS for insomnia.  · Increase Neurontin to 400mg qHS to help with pain and sleep.  · Discussed with patient and daughter informed consent, risks versus benefits, alternative treatments, side effect profile and the inherent unpredictability of individual responses to these treatments.  The patient's daughter expresses understanding of the above and displays the capacity to agree with this current plan.    History as told by patient:  Says she is doing well, but has some big stressors.  Feeling aggravated with daughter.  Got anxiety about when she needed to get to this appointment.  Bought tea to drink, put 2 in the fridge.  Writes down when she drinks them.  Went to fridge and they weren't there.  Found them in the recycling and put bottles there and left them there for a day.  She knows her memory is not good, but knows someone else drank them because she writes down when she drinks them.  She feels they put them in the recycling bin to make her "lose my mind."  Frustrated over car insurance and cost of grand-daughter being on it.  Vidhi and Rosendo go to the office.  16 y/o, 15 y/o, and 8 y/o grand-children are at home with patient.  They are good children.  She pays all the house bills.  Feels everyone at home is frustrated right now.  Had hip fracture since last appointment.  Also has arthritis in her left hip.  Says she is in a " lot of pain.  Feels anxious.  Denies SI, HI, or AVH.    Medication side effects:  No  Medication compliance:  Yes    PSYCHIATRIC REVIEW OF SYSTEMS:  Trouble with sleep:  Denies  Appetite changes:  Denies  Weight changes:  Denies  Lack of energy:  Denies  Anhedonia:  Denies  Somatic symptoms:  Denies  Libido:  Denies  Anxiety/panic:  Yes  Guilty/hopeless:  Denies  Self-injurious behavior/risky behavior:  Denies  Any drugs:  Denies  Alcohol:  Denies    MEDICAL REVIEW OF SYSTEMS:  Complete review of systems performed covering Constitutional, Musculoskeletal, Neurologic.  All systems negative except for that covered in HPI.    PAST PSYCHIATRIC, MEDICAL, AND SOCIAL HISTORY REVIEWED  The patient's past medical, family and social history have been reviewed and updated as appropriate within the electronic medical record - see encounter notes.    MEDICATIONS:    Current Outpatient Medications:     abaloparatide (TYMLOS) 80 mcg (3,120 mcg/1.56 mL) PnIj, Inject 80 mcg into the skin once daily. (Patient not taking: Reported on 7/16/2020), Disp: 4.68 mL, Rfl: 2    albuterol (ACCUNEB) 1.25 mg/3 mL Nebu, TAKE 3 ML BY NEBULIZATION EVERY 6 HOURS AS NEEDED (Patient not taking: Reported on 7/16/2020), Disp: 75 mL, Rfl: 0    albuterol (PROAIR HFA) 90 mcg/actuation inhaler, 2 puffs qid prn (Patient not taking: Reported on 7/16/2020), Disp: 54 g, Rfl: 3    amLODIPine (NORVASC) 10 MG tablet, Take 1 tablet (10 mg total) by mouth once daily., Disp: 90 tablet, Rfl: 1    amoxicillin (AMOXIL) 875 MG tablet, Take 1 tablet (875 mg total) by mouth 2 (two) times daily., Disp: 14 tablet, Rfl: 0    ascorbic acid, vitamin C, (VITAMIN C) 500 MG tablet, Take 1,500 mg by mouth once daily., Disp: , Rfl:     aspirin 325 MG tablet, Take 325 mg by mouth once daily., Disp: , Rfl:     atorvastatin (LIPITOR) 80 MG tablet, TAKE 1 TABLET(80 MG) BY MOUTH EVERY DAY, Disp: 90 tablet, Rfl: 3    b complex vitamins tablet, Take 1 tablet by mouth once daily.,  Disp: , Rfl:     benztropine (COGENTIN) 0.5 MG tablet, Take 1 tablet (0.5 mg total) by mouth 2 (two) times daily., Disp: 180 tablet, Rfl: 3    capsaicin 0.025 % PtMd, Apply 1 patch topically 2 (two) times daily as needed., Disp: 30 each, Rfl: 0    cloNIDine (CATAPRES) 0.1 MG tablet, TAKE 1 TABLET BY MOUTH TWICE DAILY AS NEEDED WHEN SYSTOLIC BLOOD PRESSURE IS ABOVE 160 OR DIASTOLIC ABOVE 100 MMHG, Disp: 180 tablet, Rfl: 0    cyanocobalamin 500 MCG tablet, Take 500 mcg by mouth once daily., Disp: , Rfl:     diclofenac sodium (VOLTAREN) 1 % Gel, Apply 2 g topically 4 (four) times daily as needed. (Patient not taking: Reported on 7/16/2020), Disp: 3 Tube, Rfl: 3    gabapentin (NEURONTIN) 100 MG capsule, Take 1 tab by mouth in the morning and middle of the day., Disp: 180 capsule, Rfl: 1    gabapentin (NEURONTIN) 400 MG capsule, Take 1 capsule (400 mg total) by mouth every evening., Disp: 90 capsule, Rfl: 1    HYDROcodone-acetaminophen (NORCO) 5-325 mg per tablet, Take 1 tablet by mouth every 8 (eight) hours as needed for Pain., Disp: 30 tablet, Rfl: 0    lisinopril 10 MG tablet, Take 10 mg by mouth daily as needed. , Disp: , Rfl:     metoprolol succinate (TOPROL-XL) 25 MG 24 hr tablet, Take 0.5 tablets (12.5 mg total) by mouth once daily., Disp: 15 tablet, Rfl: 11    multivitamin (THERAGRAN) per tablet, Take 1 tablet by mouth once daily., Disp: , Rfl:     risperiDONE (RISPERDAL) 2 MG tablet, Take 1 tablet (2 mg total) by mouth every evening., Disp: 90 tablet, Rfl: 3    risperiDONE (RISPERDAL) 4 MG tablet, Take 1 tablet (4 mg total) by mouth every morning., Disp: 90 tablet, Rfl: 3    secukinumab (COSENTYX PEN, 2 PENS,) 150 mg/mL PnIj, Inject 300 mg into the skin every 30 days., Disp: 6 Syringe, Rfl: 0    sulfaSALAzine (AZULFIDINE) 500 MG TbEC, TAKE 3 TABLETS BY MOUTH  TWICE A DAY, Disp: 540 tablet, Rfl: 0    traZODone (DESYREL) 50 MG tablet, Take 1 tablet (50 mg total) by mouth nightly as needed for  "Insomnia., Disp: 90 tablet, Rfl: 3    venlafaxine (EFFEXOR-XR) 150 MG Cp24, Take 1 capsule (150 mg total) by mouth once daily., Disp: 90 capsule, Rfl: 3    vitamin D (VITAMIN D3) 1000 units Tab, Take 1,000 Units by mouth once daily., Disp: , Rfl:     vitamin E 200 UNIT capsule, Take 200 Units by mouth once daily., Disp: , Rfl:   No current facility-administered medications for this visit.     Facility-Administered Medications Ordered in Other Visits:     0.9%  NaCl infusion, 500 mL, Intravenous, Continuous, Michelle Pineda Jr., MD    0.9%  NaCl infusion, 500 mL, Intravenous, Continuous, Michelle Pineda Jr., MD    lidocaine (PF) 10 mg/ml (1%) injection 10 mg, 1 mL, Intradermal, Once, Michelle Pineda Jr., MD    phenylephrine HCL 2.5% ophthalmic solution 1 drop, 1 drop, Right Eye, On Call Procedure, Karen Song MD, 1 drop at 08/01/19 0648    proparacaine 0.5 % ophthalmic solution 1 drop, 1 drop, Right Eye, On Call Procedure, Karen Song MD, 1 drop at 08/01/19 0638    tropicamide 1% ophthalmic solution 1 drop, 1 drop, Right Eye, On Call Procedure, Karen Song MD, 1 drop at 08/01/19 0648    ALLERGIES:  Review of patient's allergies indicates:   Allergen Reactions    Etanercept Other (See Comments)     Other reaction(s): recurrent infections    Chloramphenicol sod succinate Hives and Other (See Comments)    Codeine Other (See Comments)     Other reaction(s): Stomach upset    Nickel sutures [surgical stainless steel] Dermatitis     Allergic contact dermatitis    Adhesive Rash     Other reaction(s): Rash       PSYCHIATRIC EXAM:  There were no vitals filed for this visit.  Appearance:  Well groomed, appearing healthy and of stated age  Behavior:  Cooperative, pleasant, no psychomotor agitation or retardation  Speech:  Normal rate, rhythm, prosody, and volume  Mood:  "Upset"  Affect:  Anxious  Thought Process:  Linear, logical, goal directed  Thought Content:  Negative for suicidal " ideation, homicidal ideation, delusions or hallucinations.  Mild paranoia.  Associations:  Loose  Memory:  Poor  Level of Consciousness/Orientation:  Grossly intact  Fund of Knowledge:  Fair  Attention:  Good  Language:  Fluent, able to name abstract and concrete objects  Insight:  Fair  Judgment:  Intact  Psychomotor signs:  No involuntary movements or tremor  Gait:  Normal      RELEVANT LABS/STUDIES:    Lab Results   Component Value Date    WBC 5.98 05/06/2020    HGB 12.5 05/06/2020    HCT 39.6 05/06/2020     (H) 05/06/2020     05/06/2020     BMP  Lab Results   Component Value Date     (L) 05/06/2020    K 4.5 05/06/2020     05/06/2020    CO2 26 05/06/2020    BUN 6 (L) 05/06/2020    CREATININE 0.8 05/06/2020    CALCIUM 8.9 05/06/2020    ANIONGAP 9 05/06/2020    ESTGFRAFRICA >60.0 05/06/2020    EGFRNONAA >60.0 05/06/2020     Lab Results   Component Value Date    ALT 25 05/06/2020    AST 36 05/06/2020    GGT 91 (H) 07/28/2014    ALKPHOS 70 05/06/2020    BILITOT 0.5 05/06/2020     Lab Results   Component Value Date    TSH 2.001 11/15/2019     Lab Results   Component Value Date    HGBA1C 5.3 12/11/2018       IMPRESSION:    Bhavna Landry is a 65 y.o. female with history of Paranoid Schizophrenia, Depression, and Insomnia who presents for follow up appointment.    Status/Progress:  Based on the examination today, the patient's problem(s) is/are inadequately controlled.  New problems have not been presented today.    Risk Parameters:  Patient reports no suicidal ideation  Patient reports no homicidal ideation  Patient reports no self-injurious behavior  Patient reports no violent behavior      DIAGNOSES:    ICD-10-CM ICD-9-CM   1. Anxiety  F41.9 300.00   2. Chronic pain syndrome  G89.4 338.4   3. Paranoid schizophrenia  F20.0 295.30   4. Recurrent major depressive disorder, in full remission  F33.42 296.36   5. Insomnia, unspecified type  G47.00 780.52       PLAN:  · Patient reporting recent  stress and anxiety.  Possibly some mild paranoia versus stress from family conflict.  Will first try increasing Neurontin to 100mg/100mg/400mg to help both pain and anxiety.  Will increase dose as she tolerates.    · Continue Risperdal 4mg/2mg and Cogentin 0.5mg BID.  May need to consider increasing Risperdal in the future if any other signs of paranoia.    · Continue Effexor 150mg daily for depression.  · Continue Trazodone 50mg qHS for insomnia.  · Discussed with patient and daughter informed consent, risks versus benefits, alternative treatments, side effect profile and the inherent unpredictability of individual responses to these treatments.  The patient's daughter expresses understanding of the above and displays the capacity to agree with this current plan.  · Discussed with patient upcoming change in schedule.    RETURN TO CLINIC:  Follow up in about 2 months (around 9/28/2020).

## 2020-07-29 ENCOUNTER — TELEPHONE (OUTPATIENT)
Dept: INTERNAL MEDICINE | Facility: CLINIC | Age: 65
End: 2020-07-29

## 2020-07-29 NOTE — TELEPHONE ENCOUNTER
----- Message from Saray Rosasue sent at 7/29/2020  1:26 PM CDT -----  Contact: self   Pt states that her nose is congested and its keeping her up at night. Pt states her right ear hurts as well. Please advise

## 2020-07-30 ENCOUNTER — TELEPHONE (OUTPATIENT)
Dept: CARDIOLOGY | Facility: CLINIC | Age: 65
End: 2020-07-30

## 2020-07-30 ENCOUNTER — TELEPHONE (OUTPATIENT)
Dept: INTERNAL MEDICINE | Facility: CLINIC | Age: 65
End: 2020-07-30

## 2020-07-30 ENCOUNTER — OFFICE VISIT (OUTPATIENT)
Dept: INTERNAL MEDICINE | Facility: CLINIC | Age: 65
End: 2020-07-30
Payer: MEDICARE

## 2020-07-30 DIAGNOSIS — J02.9 SORE THROAT: ICD-10-CM

## 2020-07-30 DIAGNOSIS — R05.8 UPPER AIRWAY COUGH SYNDROME: ICD-10-CM

## 2020-07-30 DIAGNOSIS — R09.82 POST-NASAL DRIP: Primary | ICD-10-CM

## 2020-07-30 DIAGNOSIS — H92.01 EAR PAIN, REFERRED, RIGHT: ICD-10-CM

## 2020-07-30 DIAGNOSIS — J30.89 ALLERGIC RHINITIS DUE TO OTHER ALLERGIC TRIGGER, UNSPECIFIED SEASONALITY: ICD-10-CM

## 2020-07-30 PROCEDURE — 99443 PR PHYSICIAN TELEPHONE EVALUATION 21-30 MIN: CPT | Mod: 95,GC,, | Performed by: STUDENT IN AN ORGANIZED HEALTH CARE EDUCATION/TRAINING PROGRAM

## 2020-07-30 PROCEDURE — 1159F MED LIST DOCD IN RCRD: CPT | Mod: 95,GC,, | Performed by: STUDENT IN AN ORGANIZED HEALTH CARE EDUCATION/TRAINING PROGRAM

## 2020-07-30 PROCEDURE — 99443 PR PHYSICIAN TELEPHONE EVALUATION 21-30 MIN: ICD-10-PCS | Mod: 95,GC,, | Performed by: STUDENT IN AN ORGANIZED HEALTH CARE EDUCATION/TRAINING PROGRAM

## 2020-07-30 PROCEDURE — 1101F PR PT FALLS ASSESS DOC 0-1 FALLS W/OUT INJ PAST YR: ICD-10-PCS | Mod: CPTII,95,GC, | Performed by: STUDENT IN AN ORGANIZED HEALTH CARE EDUCATION/TRAINING PROGRAM

## 2020-07-30 PROCEDURE — 1101F PT FALLS ASSESS-DOCD LE1/YR: CPT | Mod: CPTII,95,GC, | Performed by: STUDENT IN AN ORGANIZED HEALTH CARE EDUCATION/TRAINING PROGRAM

## 2020-07-30 PROCEDURE — 1159F PR MEDICATION LIST DOCUMENTED IN MEDICAL RECORD: ICD-10-PCS | Mod: 95,GC,, | Performed by: STUDENT IN AN ORGANIZED HEALTH CARE EDUCATION/TRAINING PROGRAM

## 2020-07-30 NOTE — PROGRESS NOTES
Clinic Note  7/30/2020    The patient location is: home residence  The chief complaint leading to consultation is: ear pain, stuffy nose, and swollen face by sinuses    Visit type: audio only    Face to Face time with patient: None audio visit only  30 minutes of total time spent on the encounter, which includes face to face time and non-face to face time preparing to see the patient (eg, review of tests), Obtaining and/or reviewing separately obtained history, Documenting clinical information in the electronic or other health record, Independently interpreting results (not separately reported) and communicating results to the patient/family/caregiver, or Care coordination (not separately reported).     Each patient to whom he or she provides medical services by telemedicine is:  (1) informed of the relationship between the physician and patient and the respective role of any other health care provider with respect to management of the patient; and (2) notified that he or she may decline to receive medical services by telemedicine and may withdraw from such care at any time.      Subjective:       Patient ID:  Jessica is a 65 y.o. female being seen for an urgent care visit.    Chief Complaint:  Ear pain, stuffy nose, and swollen face by sinuses    HPI: Ms Landry is a 65-year-old  female with paranoid schizophrenia, MDD, anxiety, paroxysmal atrial fibrillation, osteoarthritis, chronic pain on opiates, psoriatic arthritis, lumbar spondylosis, and osteoporosis who presents to clinic today as a audio visit only for seven day history of sore throat and nasal congestion. Patient reports onset of symptoms approximately seven days which started as congestion and sore throat which she attributes to post nasal drip. She tried Benadryl at that time with limited relief so she reached out to her PCP, Dr. Vargas, who subsequently prescribed amoxicillin 875 mg BID on 7/20. She notes that she has only been using her  "amoxicillin and Flonase for the last four days now and continues to be symptomatic despite use of aforementioned medications such that two days ago she began to develop facial swelling and "dull" pain localized to the area over the right maxillary sinus/beneath the right eye. Associated with those symptoms were right sided ear pain with diminished hearing described as a "clogged feeling". She specifically denied radiation of pain anywhere else or ear discharge but reports similarity with prior ear infections. Of note, patient also has complaints of right sided headache localized to right eye (but denies blurry vision, actual eye pain, redness, or discharge), fatigue, insomnia, and three day history of productive cough with "thick clear" sputum. She denies fever, chills, night sweats, shortness of breath, wheezing, LEMON, diffuse myalgias (complaints of hip pain on the left), denies neck pain, or difficulty swallowing. Patient requested prescription of steroids today as she received steroids back in October 2019 when she had similar symptoms for about a three week duration for which was complicated by an ED visit with atrial fibrillation. Patient reports she is unable to get ride to clinic visit at this time.    Review of Systems   Constitutional: Positive for malaise/fatigue. Negative for chills, diaphoresis and fever.   HENT: Positive for congestion, ear pain, hearing loss, sinus pain and sore throat. Negative for tinnitus.    Eyes: Negative for blurred vision, double vision, pain, discharge and redness.   Respiratory: Positive for cough and sputum production. Negative for hemoptysis, shortness of breath and wheezing.    Cardiovascular: Positive for leg swelling (in left ankle, reports chronic). Negative for chest pain and palpitations.   Gastrointestinal: Positive for constipation. Negative for abdominal pain, blood in stool, diarrhea, heartburn, nausea and vomiting.   Genitourinary: Negative for dysuria, frequency, " hematuria and urgency.   Musculoskeletal: Positive for joint pain (hip pain as mentioned in HPI). Negative for back pain, falls, myalgias and neck pain.   Skin: Negative for itching and rash.   Neurological: Positive for headaches (over right eye). Negative for dizziness and loss of consciousness.   Psychiatric/Behavioral: Negative for depression and hallucinations. The patient has insomnia. The patient is not nervous/anxious.        Medication List with Changes/Refills   Current Medications    ABALOPARATIDE (TYMLOS) 80 MCG (3,120 MCG/1.56 ML) PNIJ    Inject 80 mcg into the skin once daily.    ALBUTEROL (ACCUNEB) 1.25 MG/3 ML NEBU    TAKE 3 ML BY NEBULIZATION EVERY 6 HOURS AS NEEDED    ALBUTEROL (PROAIR HFA) 90 MCG/ACTUATION INHALER    2 puffs qid prn    AMLODIPINE (NORVASC) 10 MG TABLET    Take 1 tablet (10 mg total) by mouth once daily.    AMOXICILLIN (AMOXIL) 875 MG TABLET    Take 1 tablet (875 mg total) by mouth 2 (two) times daily.    ASCORBIC ACID, VITAMIN C, (VITAMIN C) 500 MG TABLET    Take 1,500 mg by mouth once daily.    ASPIRIN 325 MG TABLET    Take 325 mg by mouth once daily.    ATORVASTATIN (LIPITOR) 80 MG TABLET    TAKE 1 TABLET(80 MG) BY MOUTH EVERY DAY    B COMPLEX VITAMINS TABLET    Take 1 tablet by mouth once daily.    BENZTROPINE (COGENTIN) 0.5 MG TABLET    Take 1 tablet (0.5 mg total) by mouth 2 (two) times daily.    CAPSAICIN 0.025 % PTMD    Apply 1 patch topically 2 (two) times daily as needed.    CLONIDINE (CATAPRES) 0.1 MG TABLET    TAKE 1 TABLET BY MOUTH TWICE DAILY AS NEEDED WHEN SYSTOLIC BLOOD PRESSURE IS ABOVE 160 OR DIASTOLIC ABOVE 100 MMHG    CYANOCOBALAMIN 500 MCG TABLET    Take 500 mcg by mouth once daily.    DICLOFENAC SODIUM (VOLTAREN) 1 % GEL    Apply 2 g topically 4 (four) times daily as needed.    GABAPENTIN (NEURONTIN) 100 MG CAPSULE    Take 1 tab by mouth in the morning and middle of the day.    GABAPENTIN (NEURONTIN) 400 MG CAPSULE    Take 1 capsule (400 mg total) by mouth  every evening.    HYDROCODONE-ACETAMINOPHEN (NORCO) 5-325 MG PER TABLET    Take 1 tablet by mouth every 8 (eight) hours as needed for Pain.    LISINOPRIL 10 MG TABLET    Take 10 mg by mouth daily as needed.     METOPROLOL SUCCINATE (TOPROL-XL) 25 MG 24 HR TABLET    Take 0.5 tablets (12.5 mg total) by mouth once daily.    MULTIVITAMIN (THERAGRAN) PER TABLET    Take 1 tablet by mouth once daily.    RISPERIDONE (RISPERDAL) 2 MG TABLET    Take 1 tablet (2 mg total) by mouth every evening.    RISPERIDONE (RISPERDAL) 4 MG TABLET    Take 1 tablet (4 mg total) by mouth every morning.    SECUKINUMAB (COSENTYX PEN, 2 PENS,) 150 MG/ML PNIJ    Inject 300 mg into the skin every 30 days.    SULFASALAZINE (AZULFIDINE) 500 MG TBEC    TAKE 3 TABLETS BY MOUTH  TWICE A DAY    TRAZODONE (DESYREL) 50 MG TABLET    Take 1 tablet (50 mg total) by mouth nightly as needed for Insomnia.    VENLAFAXINE (EFFEXOR-XR) 150 MG CP24    Take 1 capsule (150 mg total) by mouth once daily.    VITAMIN D (VITAMIN D3) 1000 UNITS TAB    Take 1,000 Units by mouth once daily.    VITAMIN E 200 UNIT CAPSULE    Take 200 Units by mouth once daily.       Patient Active Problem List   Diagnosis    PSA (psoriatic arthritis)    Low back pain    Essential hypertension    Hyperlipidemia    Paranoid schizophrenia    Insomnia    Extrapyramidal syndrome    Migraine without aura and without status migrainosus, not intractable    Total knee replacement status    Nuclear sclerosis - Both Eyes    Duane's syndrome of right eye    Posterior vitreous detachment, both eyes    Retinal hemorrhage, left    Retinal tear    Syncope    Fibromyalgia    History of nickel allergy    Exposure to nickel dust    Internal derangement of right knee    S/P R knee surgery, TKA revision    Knee pain, right    Pain of right tibia    Brow ptosis    Recurrent major depressive disorder, in full remission    Sacroiliac joint dysfunction    Decreased ROM of lumbar spine     "Decreased strength    Acute right-sided low back pain without sciatica    Chronic pain    Myofascial pain syndrome    Restless legs    Osteoporosis, post-menopausal    Senile nuclear sclerosis    Venous incompetence    Bilateral sacroiliitis    PAF (paroxysmal atrial fibrillation)    Bilateral carotid artery disease    Chronic anticoagulation    Left-sided low back pain without sciatica    Left groin pain    Left hip pain         Objective:      There were no vitals taken for this visit.  Estimated body mass index is 19.02 kg/m² as calculated from the following:    Height as of 7/16/20: 5' 2" (1.575 m).    Weight as of 7/16/20: 47.2 kg (104 lb).    No Physical Exam for this visit as virtual visit with audio only.      Assessment and Plan:   Diagnoses and all orders for this visit:    Post-nasal drip  Upper airway cough syndrome  Allergic rhinitis due to other allergic trigger, unspecified seasonality  Ear pain, referred, right  Sore throat         - Patient encouraged to continue with antibiotic course as prescribed and resume conservative measures for management of her symptoms.         - She was instructed to resume OTC Flonase and anti-histamines (can do Benadryl nightly and Claritin or Zyrtec during the day).         - Encouraged to try steamy showers, staying hydrated, gargling with saline water, and use of over the counter ibuprofen.         - Would stay clear of steroids given history of schizophrenia, atrial fibrillation, and use of biologic for psoriatic arthritis.         - Will also avoid sympathomimetics like pseudoephedrine given concern for HTN and PRN clonidine.          - She was advised to schedule in person visit if symptoms fail to improve with aforementioned conservative measures.    Follow Up:   Follow up if symptoms worsen or fail to improve, for follow up PRN if symptoms fail to resolve or worsen..      Case discussed with Dr. Carrillo.    Henok Sawyer MD  PGY-2 Internal " Medicine

## 2020-07-30 NOTE — PATIENT INSTRUCTIONS
Allergic Rhinitis  Allergic rhinitis is an allergic reaction that affects the nose, and often the eyes. Its often known as nasal allergies. Nasal allergies are often due to things in the environment that are breathed in. Depending what you are sensitive to, nasal allergies may occur only during certain seasons. Or they may occur year round. Common indoor allergens include house dust mites, mold, cockroaches, and pet dander. Outdoor allergens include pollen from trees, grasses, and weeds.   Symptoms include a drippy, stuffy, and itchy nose. They also include sneezing and red and itchy eyes. You may feel tired more often. Severe allergies may also affect your breathing and trigger a condition called asthma.   Tests can be done to see what allergens are affecting you. You may be referred to an allergy specialist for testing and further evaluation.  Home care  Your healthcare provider may prescribe medicines to help relieve allergy symptoms. These may include oral medicines, nasal sprays, or eye drops.  Ask your provider for advice on how to avoid substances that you are allergic to. Below are a few tips for each type of allergen.  Pet dander:  · Do not have pets with fur and feathers.  · If you can't avoid having a pet, keep it out of your bedroom and off upholstered furniture.  Pollen:  · When pollen counts are high, keep windows of your car and home closed. If possible, use an air conditioner instead.  · Wear a filter mask when mowing or doing yard work.  House dust mites:  · Wash bedding every week in warm water and detergent and dry on a hot setting.  · Cover the mattress, box spring, and pillows with allergy covers.   · If possible, sleep in a room with no carpet, curtains, or upholstered furniture.  Cockroaches:  · Store food in sealed containers.  · Remove garbage from the home promptly.  · Fix water leaks  Mold:  · Keep humidity low by using a dehumidifier or air conditioner. Keep the dehumidifier and air  conditioner clean and free of mold.  · Clean moldy areas with bleach and water.  In general:  · Vacuum once or twice a week. If possible, use a vacuum with a high-efficiency particulate air (HEPA) filter.  · Do not smoke. Avoid cigarette smoke. Cigarette smoke is an irritant that can make symptoms worse.  Follow-up care  Follow up as advised by the healthcare provider or our staff. If you were referred to an allergy specialist, make this appointment promptly.  When to seek medical advice  Call your healthcare provider right away if the following occur:  · Coughing or wheezing  · Fever greater than 100.4°F (38°C)  · Hives (raised red bumps)  · Continuing symptoms, new symptoms, or worsening symptoms  Call 911 right away if you have:  · Trouble breathing  · Severe swelling of the face or severe itching of the eyes or mouth  Date Last Reviewed: 3/1/2017  © 0104-3315 [employer]. [address] [place] [address]. All rights reserved. This information is not intended as a substitute for professional medical care. Always follow your healthcare professional's instructions.      Self-Care for Sore Throats    Sore throats happen for many reasons, such as colds, allergies, and infections caused by viruses or bacteria. In any case, your throat becomes red and sore. Your goal for self-care is to reduce your discomfort while giving your throat a chance to heal.  Moisten and soothe your throat  Tips include the following:  · Try a sip of water first thing after waking up.  · Keep your throat moist by drinking 6 or more glasses of clear liquids every day.  · Run a cool-air humidifier in your room overnight.  · Avoid cigarette smoke.   · Suck on throat lozenges, cough drops, hard candy, ice chips, or frozen fruit-juice bars. Use the sugar-free versions if your diet or medical condition requires them.  Gargle to ease irritation  Gargling every hour or 2 can ease irritation. Try gargling with 1 of these  solutions:  · 1/4 teaspoon of salt in 1/2 cup of warm water  · An over-the-counter anesthetic gargle  Use medicine for more relief  Over-the-counter medicine can reduce sore throat symptoms. Ask your pharmacist if you have questions about which medicine to use:  · Ease pain with anesthetic sprays. Aspirin or an aspirin substitute also helps. Remember, never give aspirin to anyone 18 or younger, or if you are already taking blood thinners.   · For sore throats caused by allergies, try antihistamines to block the allergic reaction.  · Remember: unless a sore throat is caused by a bacterial infection, antibiotics wont help you.  Prevent future sore throats  Prevention tips include the following:  · Stop smoking or reduce contact with secondhand smoke. Smoke irritates the tender throat lining.  · Limit contact with pets and with allergy-causing substances, such as pollen and mold.  · When youre around someone with a sore throat or cold, wash your hands often to keep viruses or bacteria from spreading.  · Dont strain your vocal cords.  Call your healthcare provider  Contact your healthcare provider if you have:  · A temperature over 101°F (38.3°C)  · White spots on the throat  · Great difficulty swallowing  · Trouble breathing  · A skin rash  · Recent exposure to someone else with strep bacteria  · Severe hoarseness and swollen glands in the neck or jaw   Date Last Reviewed: 8/1/2016  © 1338-8811 [employer]. [address] [place] [address]. All rights reserved. This information is not intended as a substitute for professional medical care. Always follow your healthcare professional's instructions.        Sinus Headaches     When using nasal spray, keep your chin down and angle the spray away from center.     Sinus headaches can cause a gnawing pain behind the nose and eyes. The pain most often gets worse in the afternoon and evening. You may also run a fever. Sinus headaches are caused by  colds or allergies that make the nasal passages inflamed or infected.  To help prevent sinus headaches:  · Treat colds promptly to keep mucus from backing up.  · Avoid things that trigger sinus problems, such as pollens, dust, smoke, fumes, and strong odors.  · Take allergy medicines as directed by your healthcare provider.  To relieve the pain:  · Keep your sinuses open and free of mucus. Try over-the-counter sinus rinse products.  · Use a nasal decongestant as directed to reduce the inflammation.  · Drink fluids to keep the mucus thinner. This helps it drain more easily. You can also use a humidifier.  · Apply hot packs to the area around your sinuses. Use a hot water bottle.  · See your healthcare provider if your sinus headache lasts more than 2 weeks. You may need medicine for a sinus infection or an exam to check for other headache conditions, like migraines.  Date Last Reviewed: 10/1/2016  © 0176-0969 [employer]. [address] [place] [address]. All rights reserved. This information is not intended as a substitute for professional medical care. Always follow your healthcare professional's instructions.      Viral Upper Respiratory Illness (Adult)  You have a viral upper respiratory illness (URI), which is another term for the common cold. This illness is contagious during the first few days. It is spread through the air by coughing and sneezing. It may also be spread by direct contact (touching the sick person and then touching your own eyes, nose, or mouth). Frequent handwashing will decrease risk of spread. Most viral illnesses go away within 7 to 10 days with rest and simple home remedies. Sometimes the illness may last for several weeks. Antibiotics will not kill a virus, and they are generally not prescribed for this condition.    Home care  · If symptoms are severe, rest at home for the first 2 to 3 days. When you resume activity, don't let yourself get too tired.  · Avoid being  exposed to cigarette smoke (yours or others).  · You may use acetaminophen or ibuprofen to control pain and fever, unless another medicine was prescribed. (Note: If you have chronic liver or kidney disease, have ever had a stomach ulcer or gastrointestinal bleeding, or are taking blood-thinning medicines, talk with your healthcare provider before using these medicines.) Aspirin should never be given to anyone under 18 years of age who is ill with a viral infection or fever. It may cause severe liver or brain damage.  · Your appetite may be poor, so a light diet is fine. Avoid dehydration by drinking 6 to 8 glasses of fluids per day (water, soft drinks, juices, tea, or soup). Extra fluids will help loosen secretions in the nose and lungs.  · Over-the-counter cold medicines will not shorten the length of time youre sick, but they may be helpful for the following symptoms: cough, sore throat, and nasal and sinus congestion. (Note: Do not use decongestants if you have high blood pressure.)  Follow-up care  Follow up with your healthcare provider, or as advised.  When to seek medical advice  Call your healthcare provider right away if any of these occur:  · Cough with lots of colored sputum (mucus)  · Severe headache; face, neck, or ear pain  · Difficulty swallowing due to throat pain  · Fever of 100.4°F (38°C)  Call 911, or get immediate medical care  Call emergency services right away if any of these occur:  · Chest pain, shortness of breath, wheezing, or difficulty breathing  · Coughing up blood  · Inability to swallow due to throat pain  Date Last Reviewed: 9/13/2015 © 2000-2017 [employer]. [address] [place] [address], [place] [address]. All rights reserved. This information is not intended as a substitute for professional medical care. Always follow your healthcare professional's instructions.

## 2020-07-30 NOTE — TELEPHONE ENCOUNTER
----- Message from Patty Khanna sent at 7/30/2020 11:06 AM CDT -----  Contact: Pt [phone]  Patient is returning a phone call.  Who left a message for the patient: leonor  Does patient know what this is regarding:    Comments:

## 2020-07-30 NOTE — TELEPHONE ENCOUNTER
Reached out to pt and rescheduled appt per request    New appt set for 8/20 at 2 pm    Verbalized understanding

## 2020-07-30 NOTE — TELEPHONE ENCOUNTER
----- Message from Viktor Drake sent at 7/30/2020 10:46 AM CDT -----  Would like to get medical advice.  Symptoms (please be specific):  Stuffed up nose, right ear pain, and face swollen by nose  How long has patient had these symptoms:  3 days  Pharmacy name and phone #:  [id number] - [place], [place] - [address] AT [facility] [address] [phone] (Phone)  [phone] (Fax)    Comments:  She only wanted to have a virtual with you because she can't come in today. She accepted a virtual with a colleague. But, still asked me to send this message

## 2020-07-30 NOTE — TELEPHONE ENCOUNTER
----- Message from Kalee Claire sent at 7/30/2020  3:20 PM CDT -----  Contact: Patient  Patient would like yo reschedule her appointment     Please call [phone]

## 2020-07-31 ENCOUNTER — OFFICE VISIT (OUTPATIENT)
Dept: URGENT CARE | Facility: CLINIC | Age: 65
End: 2020-07-31
Payer: MEDICARE

## 2020-07-31 ENCOUNTER — PATIENT OUTREACH (OUTPATIENT)
Dept: OTHER | Facility: OTHER | Age: 65
End: 2020-07-31

## 2020-07-31 VITALS
HEIGHT: 62 IN | DIASTOLIC BLOOD PRESSURE: 60 MMHG | BODY MASS INDEX: 19.14 KG/M2 | WEIGHT: 104 LBS | HEART RATE: 89 BPM | SYSTOLIC BLOOD PRESSURE: 119 MMHG | OXYGEN SATURATION: 100 % | TEMPERATURE: 97 F | RESPIRATION RATE: 18 BRPM

## 2020-07-31 DIAGNOSIS — B96.89 ACUTE BACTERIAL SINUSITIS: Primary | ICD-10-CM

## 2020-07-31 DIAGNOSIS — J01.90 ACUTE BACTERIAL SINUSITIS: Primary | ICD-10-CM

## 2020-07-31 PROCEDURE — 99213 OFFICE O/P EST LOW 20 MIN: CPT | Mod: S$GLB,,, | Performed by: PHYSICIAN ASSISTANT

## 2020-07-31 PROCEDURE — 99213 PR OFFICE/OUTPT VISIT, EST, LEVL III, 20-29 MIN: ICD-10-PCS | Mod: S$GLB,,, | Performed by: PHYSICIAN ASSISTANT

## 2020-07-31 RX ORDER — FLUTICASONE PROPIONATE 50 MCG
1 SPRAY, SUSPENSION (ML) NASAL DAILY
Qty: 18.2 ML | Refills: 0 | Status: SHIPPED | OUTPATIENT
Start: 2020-07-31 | End: 2020-12-01 | Stop reason: SDUPTHER

## 2020-07-31 RX ORDER — DOXYCYCLINE HYCLATE 100 MG
100 TABLET ORAL 2 TIMES DAILY
Qty: 14 TABLET | Refills: 0 | Status: SHIPPED | OUTPATIENT
Start: 2020-07-31 | End: 2020-08-07

## 2020-07-31 NOTE — PROGRESS NOTES
"Subjective:       Patient ID: Bhavna Landry is a 65 y.o. female.    Vitals:  height is 5' 2" (1.575 m) and weight is 47.2 kg (104 lb). Her temperature is 97.1 °F (36.2 °C). Her blood pressure is 119/60 and her pulse is 89. Her respiration is 18 and oxygen saturation is 100%.     Chief Complaint: Otalgia    Ms. Landry presents for evaluation of right ear pain, sore throat, sinus pain & pressure, and PND x 10 days.  She called her PCP & amoxicillin x 7 days was called into her pharmacy for her.  She denies any fevers, chills, cough, shortness of breath, chest pain, leg swelling, nausea, vomiting, diarrhea.  She did not have improvement in symptoms after course of antibiotics.     Otalgia   There is pain in the right ear. This is a new problem. The current episode started 1 to 4 weeks ago. The problem occurs constantly. The problem has been unchanged. There has been no fever. The pain is at a severity of 7/10. The pain is moderate. Associated symptoms include headaches and a sore throat. Pertinent negatives include no abdominal pain, coughing, diarrhea, ear discharge, hearing loss, neck pain, rash, rhinorrhea or vomiting. She has tried antibiotics for the symptoms. The treatment provided no relief.       Constitution: Negative for chills, sweating, fatigue and fever.   HENT: Positive for ear pain, congestion, postnasal drip, sinus pain, sinus pressure and sore throat. Negative for ear discharge and hearing loss.    Neck: Negative for neck pain.   Cardiovascular: Negative for chest trauma, chest pain, leg swelling, palpitations, sob on exertion and passing out.   Respiratory: Negative for cough and shortness of breath.    Gastrointestinal: Negative for abdominal pain, nausea, vomiting, constipation and diarrhea.   Genitourinary: Negative for dysuria, frequency and urgency.   Musculoskeletal: Negative for pain.   Skin: Negative for rash.   Neurological: Positive for headaches. Negative for dizziness.       Objective:    "   Physical Exam   Constitutional: She is oriented to person, place, and time. She appears well-developed. She is cooperative.  Non-toxic appearance. She does not appear ill. No distress.   HENT:   Head: Normocephalic and atraumatic.   Ears:   Right Ear: Hearing, tympanic membrane, external ear and ear canal normal.   Left Ear: Hearing, tympanic membrane, external ear and ear canal normal.   Nose: No mucosal edema, rhinorrhea or nasal deformity. No epistaxis. Right sinus exhibits maxillary sinus tenderness and frontal sinus tenderness. Left sinus exhibits no maxillary sinus tenderness and no frontal sinus tenderness.   Mouth/Throat: Uvula is midline, oropharynx is clear and moist and mucous membranes are normal. No trismus in the jaw. Normal dentition. No uvula swelling. No oropharyngeal exudate, posterior oropharyngeal edema or posterior oropharyngeal erythema. Tonsils are 2+ on the right. Tonsils are 2+ on the left. No tonsillar exudate.   Eyes: Conjunctivae and lids are normal. No scleral icterus.   Neck: Trachea normal, full passive range of motion without pain and phonation normal. Neck supple. No neck rigidity. No edema and no erythema present.   Cardiovascular: Normal rate, regular rhythm, normal heart sounds and normal pulses.   Pulmonary/Chest: Effort normal and breath sounds normal. No stridor. No respiratory distress. She has no decreased breath sounds. She has no wheezes. She has no rhonchi. She has no rales.   Abdominal: Normal appearance.   Musculoskeletal: Normal range of motion.         General: No deformity.   Neurological: She is alert and oriented to person, place, and time. She exhibits normal muscle tone. Coordination normal.   Skin: Skin is warm, dry, intact, not diaphoretic and not pale. Psychiatric: Her speech is normal and behavior is normal. Judgment and thought content normal.   Nursing note and vitals reviewed.        Assessment:       1. Acute bacterial sinusitis        Plan:          Acute bacterial sinusitis    Other orders  -     doxycycline (VIBRA-TABS) 100 MG tablet; Take 1 tablet (100 mg total) by mouth 2 (two) times daily. for 7 days  Dispense: 14 tablet; Refill: 0  -     fluticasone propionate (FLONASE) 50 mcg/actuation nasal spray; 1 spray (50 mcg total) by Each Nostril route once daily.  Dispense: 18.2 mL; Refill: 0      Patient Instructions     PLEASE READ YOUR DISCHARGE INSTRUCTIONS ENTIRELY AS IT CONTAINS IMPORTANT INFORMATION.  - Rest.    - Drink plenty of fluids.    - Tylenol or Ibuprofen as directed as needed for fever/pain.    - If you were prescribed antibiotics, please take them to completion.  - If you are female and on birth control pills - please use additional methods of contraception to prevent pregnancy while on antibiotics and for one cycle after.   - If you were prescribed a narcotic medication or muscle relaxer, do not drive or operate heavy equipment or machinery while taking these medications, as they can cause drowsiness.   - If you smoke, please stop smoking.  -You must understand that you've received an Urgent Care treatment only and that you may be released before all your medical problems are known or treated. You, the patient, will    arrange for follow up care as instructed. Please arrange follow up with your primary medical clinic as soon as possible.   - Follow up with your PCP or specialty clinic as directed in the next 1-2 weeks if not improved or as needed.  You can call [phone] to schedule an appointment with the appropriate provider.    - Please return to Urgent Care or to the Emergency Department if your symptoms worsen.    Patient aware and verbalized understanding.    Acute Bacterial Rhinosinusitis (ABRS)    Acute bacterial rhinosinusitis (ABRS) is an infection of your nasal cavity and sinuses. Its caused by bacteria. Acute means that youve had symptoms for less than 12 weeks.  Understanding your sinuses  The nasal cavity is the large  air-filled space behind your nose. The sinuses are a group of spaces formed by the bones of your face. They connect with your nasal cavity. ABRS causes the tissue lining these spaces to become inflamed. Mucus may not drain normally. This leads to facial pain and other symptoms.  What causes ABRS?  ABRS most often follows an upper respiratory infection caused by a virus. Bacteria then infect the lining of your nasal cavity and sinuses. But you can also get ABRS if you have:  · Nasal allergies  · Long-term nasal swelling and congestion not caused by allergies  · Blockage in the nose  Symptoms of ABRS  The symptoms of ABRS may be different for each person, and can include:  · Nasal congestion  · Runny nose  · Fluid draining from the nose down the throat (postnasal drip)  · Headache  · Cough  · Pain in the sinuses  · Thick, colored fluid from the nose (mucus)  · Fever  Diagnosing ABRS  ABRS may be diagnosed if youve had an upper respiratory infection like a cold and cough for longer than 10 to 14 days. Your health care provider will ask about your symptoms and your medical history. The provider will check your vital signs, including your temperature. Youll have a physical exam. The health care provider will check your ears, nose, and throat. You likely wont need any tests. If ABRS comes back, you may have a culture or other tests.  Treatment for ABRS  Treatment may include:  · Antibiotic medicine. This is for symptoms that last for at least 10 to 14 days.  · Nasal corticosteroid medicine. Drops or spray used in the nose can lessen swelling and congestion.  · Over-the-counter pain medicine. This is to lessen sinus pain and pressure.  · Nasal decongestant medicine. Spray or drops may help to lessen congestion. Do not use them for more than a few days.  · Salt wash (saline irrigation). This can help to loosen mucus.  Possible complications of ABRS  ABRS may come back or become long-term (chronic).  In rare cases, ABRS  may cause complications such as:   · Inflamed tissue around the brain and spinal cord (meningitis)  · Inflamed tissue around the eyes (orbital cellulitis)  · Inflamed bones around the sinuses (osteitis)  These problems may need to be treated in a hospital with intravenous (IV) antibiotic medicine or surgery.  When to call the health care provider  Call your health care provider if you have any of the following:  · Symptoms that dont get better, or get worse  · Symptoms that dont get better after 3 to 5 days on antibiotics  · Trouble seeing  · Swelling around your eyes  · Confusion or trouble staying awake   Date Last Reviewed: 3/3/2015  © 6955-8582 [employer]. [address] [place] [address], [place] [address]. All rights reserved. This information is not intended as a substitute for professional medical care. Always follow your healthcare professional's instructions.

## 2020-07-31 NOTE — PROGRESS NOTES
Digital Medicine: Clinician Follow-Up    The history is provided by the patient.   Follow-up reason(s): routine follow up.     Hypertension    Readings are trending down   Patient is not experiencing signs/symptoms of hypotension.  Patient is not experiencing signs/symptoms of hypertension.   except on the day where her BP was 109/44 where she felt dizzy        Last 5 Patient Entered Readings                                      Current 30 Day Average: 135/67     Recent Readings 7/28/2020 7/24/2020 7/22/2020 7/21/2020 7/19/2020    SBP (mmHg) 148 109 133 123 178    DBP (mmHg) 73 44 92 62 87    Pulse 65 68 66 70 67             Screenings    ASSESSMENT(S)  Patients BP average is 139/70 mmHg, which is above goal. Patient's BP goal is less than or equal to 130/80 per 2017 ACC/AHA Hypertension Guidelines.       Hypertension Plan  Continue current therapy. DBP is dropping too low sometimes to add more medications may make thsis worse  Continue current diet/physical activity routine.       Addressed any questions or concerns and patient has my contact information if needed prior to next outreach. Patient verbalizes understanding.      Explained the importance of self-monitoring and medication adherence. Encouraged the patient to communicate with their health  for lifestyle modifications to help improve or maintain a healthy lifestyle.        Explained to the patient that the Digital Medicine team is not available for emergencies. Advised patient call [facility] ([phone] or [phone]) or [phone] if needed.         There are no preventive care reminders to display for this patient.      Hypertension Medications             amLODIPine (NORVASC) 10 MG tablet Take 1 tablet (10 mg total) by mouth once daily.    cloNIDine (CATAPRES) 0.1 MG tablet TAKE 1 TABLET BY MOUTH TWICE DAILY AS NEEDED WHEN SYSTOLIC BLOOD PRESSURE IS ABOVE 160 OR DIASTOLIC ABOVE 100 MMHG    lisinopril 10 MG tablet Take 10 mg by mouth daily as  needed.     metoprolol succinate (TOPROL-XL) 25 MG 24 hr tablet Take 0.5 tablets (12.5 mg total) by mouth once daily.

## 2020-07-31 NOTE — PATIENT INSTRUCTIONS
PLEASE READ YOUR DISCHARGE INSTRUCTIONS ENTIRELY AS IT CONTAINS IMPORTANT INFORMATION.  - Rest.    - Drink plenty of fluids.    - Tylenol or Ibuprofen as directed as needed for fever/pain.    - If you were prescribed antibiotics, please take them to completion.  - If you are female and on birth control pills - please use additional methods of contraception to prevent pregnancy while on antibiotics and for one cycle after.   - If you were prescribed a narcotic medication or muscle relaxer, do not drive or operate heavy equipment or machinery while taking these medications, as they can cause drowsiness.   - If you smoke, please stop smoking.  -You must understand that you've received an Urgent Care treatment only and that you may be released before all your medical problems are known or treated. You, the patient, will    arrange for follow up care as instructed. Please arrange follow up with your primary medical clinic as soon as possible.   - Follow up with your PCP or specialty clinic as directed in the next 1-2 weeks if not improved or as needed.  You can call [phone] to schedule an appointment with the appropriate provider.    - Please return to Urgent Care or to the Emergency Department if your symptoms worsen.    Patient aware and verbalized understanding.    Acute Bacterial Rhinosinusitis (ABRS)    Acute bacterial rhinosinusitis (ABRS) is an infection of your nasal cavity and sinuses. Its caused by bacteria. Acute means that youve had symptoms for less than 12 weeks.  Understanding your sinuses  The nasal cavity is the large air-filled space behind your nose. The sinuses are a group of spaces formed by the bones of your face. They connect with your nasal cavity. ABRS causes the tissue lining these spaces to become inflamed. Mucus may not drain normally. This leads to facial pain and other symptoms.  What causes ABRS?  ABRS most often follows an upper respiratory infection caused by a virus. Bacteria  then infect the lining of your nasal cavity and sinuses. But you can also get ABRS if you have:  · Nasal allergies  · Long-term nasal swelling and congestion not caused by allergies  · Blockage in the nose  Symptoms of ABRS  The symptoms of ABRS may be different for each person, and can include:  · Nasal congestion  · Runny nose  · Fluid draining from the nose down the throat (postnasal drip)  · Headache  · Cough  · Pain in the sinuses  · Thick, colored fluid from the nose (mucus)  · Fever  Diagnosing ABRS  ABRS may be diagnosed if youve had an upper respiratory infection like a cold and cough for longer than 10 to 14 days. Your health care provider will ask about your symptoms and your medical history. The provider will check your vital signs, including your temperature. Youll have a physical exam. The health care provider will check your ears, nose, and throat. You likely wont need any tests. If ABRS comes back, you may have a culture or other tests.  Treatment for ABRS  Treatment may include:  · Antibiotic medicine. This is for symptoms that last for at least 10 to 14 days.  · Nasal corticosteroid medicine. Drops or spray used in the nose can lessen swelling and congestion.  · Over-the-counter pain medicine. This is to lessen sinus pain and pressure.  · Nasal decongestant medicine. Spray or drops may help to lessen congestion. Do not use them for more than a few days.  · Salt wash (saline irrigation). This can help to loosen mucus.  Possible complications of ABRS  ABRS may come back or become long-term (chronic).  In rare cases, ABRS may cause complications such as:   · Inflamed tissue around the brain and spinal cord (meningitis)  · Inflamed tissue around the eyes (orbital cellulitis)  · Inflamed bones around the sinuses (osteitis)  These problems may need to be treated in a hospital with intravenous (IV) antibiotic medicine or surgery.  When to call the health care provider  Call your health care provider if  you have any of the following:  · Symptoms that dont get better, or get worse  · Symptoms that dont get better after 3 to 5 days on antibiotics  · Trouble seeing  · Swelling around your eyes  · Confusion or trouble staying awake   Date Last Reviewed: 3/3/2015  © 3526-2908 [employer]. [address] [place] [address], [place] [address]. All rights reserved. This information is not intended as a substitute for professional medical care. Always follow your healthcare professional's instructions.

## 2020-08-04 ENCOUNTER — PATIENT OUTREACH (OUTPATIENT)
Dept: ADMINISTRATIVE | Facility: HOSPITAL | Age: 65
End: 2020-08-04

## 2020-08-05 ENCOUNTER — TELEPHONE (OUTPATIENT)
Dept: INTERNAL MEDICINE | Facility: CLINIC | Age: 65
End: 2020-08-05

## 2020-08-05 DIAGNOSIS — J06.9 VIRAL URI: Primary | ICD-10-CM

## 2020-08-05 NOTE — TELEPHONE ENCOUNTER
Pt said she was sick last week of October and first 2 weeks in November. Said this has been going on for 3 weeks,. 2 weeks on antidotic and the first week she didn't call.

## 2020-08-05 NOTE — TELEPHONE ENCOUNTER
----- Message from Neisha Martinez sent at 8/5/2020  9:07 AM CDT -----  Contact: self/[phone]  Pt called in regards to asking the dr can she get the antibody test done. She went to the er and was told she still has a sinus infection after taken the rx that she was given. She would like a call back.    Please advise

## 2020-08-05 NOTE — TELEPHONE ENCOUNTER
It takes several weeks after infection for antibodies to turn positive - so will check in 1 month.  We can discuss further at her upcoming appt

## 2020-08-06 ENCOUNTER — PATIENT OUTREACH (OUTPATIENT)
Dept: OTHER | Facility: OTHER | Age: 65
End: 2020-08-06

## 2020-08-06 ENCOUNTER — LAB VISIT (OUTPATIENT)
Dept: LAB | Facility: HOSPITAL | Age: 65
End: 2020-08-06
Attending: INTERNAL MEDICINE
Payer: MEDICARE

## 2020-08-06 DIAGNOSIS — J06.9 VIRAL URI: ICD-10-CM

## 2020-08-06 PROCEDURE — 86769 SARS-COV-2 COVID-19 ANTIBODY: CPT

## 2020-08-06 PROCEDURE — 36415 COLL VENOUS BLD VENIPUNCTURE: CPT | Mod: PO

## 2020-08-06 NOTE — PROGRESS NOTES
Digital Medicine: Clinician Follow-Up    Called patient for routine follow up on blood pressure. Patient reports adherence to medication regimen daily and denies missed doses. Patient denies hypotensive s/sx (lightheadedness, dizziness, nausea, fatigue); patient denies hypertensive s/sx (SOB, CP, severe headaches, changes in vision, dizziness, fatigue).  She has not experienced another low since last week.      The history is provided by the patient.   Follow-up reason(s): routine follow up.     Hypertension    Readings are trending down due to medication adherence.       Patient is not experiencing signs/symptoms of hypotension.  Patient is not experiencing signs/symptoms of hypertension.          Last 5 Patient Entered Readings                                      Current 30 Day Average: 139/70     Recent Readings 8/4/2020 8/2/2020 7/28/2020 7/24/2020 7/22/2020    SBP (mmHg) 140 148 148 109 133    DBP (mmHg) 65 69 73 44 92    Pulse 74 73 65 68 66             Screenings    ASSESSMENT(S)  Patients BP average is 139/70 mmHg, which is above goal. Patient's BP goal is less than or equal to 130/80 per 2017 ACC/AHA Hypertension Guidelines.       Hypertension Plan  Continue current therapy.  Continue current diet/physical activity routine.       Addressed any questions or concerns and patient has my contact information if needed prior to next outreach. Patient verbalizes understanding.      Explained to the patient that the Digital Medicine team is not available for emergencies. Advised patient call [facility] ([phone] or [phone]) or [phone] if needed.         There are no preventive care reminders to display for this patient.      Hypertension Medications             amLODIPine (NORVASC) 10 MG tablet Take 1 tablet (10 mg total) by mouth once daily.    cloNIDine (CATAPRES) 0.1 MG tablet TAKE 1 TABLET BY MOUTH TWICE DAILY AS NEEDED WHEN SYSTOLIC BLOOD PRESSURE IS ABOVE 160 OR DIASTOLIC ABOVE 100 MMHG     lisinopril 10 MG tablet Take 10 mg by mouth daily as needed.     metoprolol succinate (TOPROL-XL) 25 MG 24 hr tablet Take 0.5 tablets (12.5 mg total) by mouth once daily.

## 2020-08-07 ENCOUNTER — TELEPHONE (OUTPATIENT)
Dept: RHEUMATOLOGY | Facility: CLINIC | Age: 65
End: 2020-08-07

## 2020-08-07 LAB — SARS-COV-2 IGG SERPLBLD QL IA.RAPID: NEGATIVE

## 2020-08-07 NOTE — TELEPHONE ENCOUNTER
Spoke with the patient and let her know that I would have to move her appointment and placed in the mail

## 2020-08-11 DIAGNOSIS — I10 ESSENTIAL HYPERTENSION: Chronic | ICD-10-CM

## 2020-08-11 RX ORDER — DOXYCYCLINE HYCLATE 100 MG
100 TABLET ORAL 2 TIMES DAILY
Qty: 14 TABLET | Refills: 0 | OUTPATIENT
Start: 2020-08-11 | End: 2020-08-18

## 2020-08-11 NOTE — TELEPHONE ENCOUNTER
No new care gaps identified.  Powered by [employer]. Reference number: [id number]. 8/11/2020 10:11:07 AM   BRENDA

## 2020-08-11 NOTE — TELEPHONE ENCOUNTER
----- Message from Gloria Coello sent at 8/11/2020  3:07 PM CDT -----  Regarding: refill  Contact: Jessica [phone]  Rx Refill/Request     Is this a Refill or New Rx:  refill    Rx Name and Strength:    doxycycline (VIBRA-TABS) 100 MG tablet 14 tablet     Preferred Pharmacy with phone number:   [id number] - [place] - [address] [facility] [address]   [phone] (Phone)  [phone] (Fax)      Communication Preference:Jessica [phone]    Additional Information:    Please call pt when sent to the pharmacy. This medication was Rx by the Urgent Care clinic.

## 2020-08-12 ENCOUNTER — TELEPHONE (OUTPATIENT)
Dept: INTERNAL MEDICINE | Facility: CLINIC | Age: 65
End: 2020-08-12

## 2020-08-12 ENCOUNTER — TELEPHONE (OUTPATIENT)
Dept: OTOLARYNGOLOGY | Facility: CLINIC | Age: 65
End: 2020-08-12

## 2020-08-12 RX ORDER — CLONIDINE HYDROCHLORIDE 0.1 MG/1
TABLET ORAL
Qty: 60 TABLET | Refills: 2 | Status: SHIPPED | OUTPATIENT
Start: 2020-08-12 | End: 2020-08-31 | Stop reason: ALTCHOICE

## 2020-08-12 NOTE — TELEPHONE ENCOUNTER
Spoke with the pt. She explained that she has had facial swelling in cheek and above right eye. Started 2 weeks ago. Went to urgent care and was put on antibiotics. Says the swelling got better while on antibiotics but came back after finishing them. I explained that a covid test may be required 72 hours prior to an apt because the doctor may use a scope to assess her sinuses. Pt stated that she would call back once she finds out what her grandsons school schedule looks like before scheduling an apt. I verbalized understanding.

## 2020-08-12 NOTE — TELEPHONE ENCOUNTER
----- Message from Salena Lopes sent at 8/12/2020  9:34 AM CDT -----  Contact: Keny @ [phone]  Requesting an RX refill or new RX.  Is this a refill or new RX:  Refill  RX name and strength: doxycycline (VIBRA-TABS) 100 MG tablet  Directions (copy/paste from chart):  Take 1 tablet (100 mg total) by mouth 2 (two) times daily. for 7 days -  Is this a 30 day or 90 day RX:  30  Local pharmacy or mail order pharmacy:  local   Pharmacy name and phone # (copy/paste from chart): [facility]   [phone] (Phone)  [phone] (Fax)  Comments:  Face is still swollen from her sinus. The Rx helped but now has returned.

## 2020-08-12 NOTE — PROGRESS NOTES
Refill Routing Note   Medication(s) are not appropriate for processing by [facility]:    - Outside of Protocol       Automatic Epic Protocol Generated Data:    Requested Prescriptions   Pending Prescriptions Disp Refills    cloNIDine (CATAPRES) 0.1 MG tablet [Pharmacy Med Name: CLONIDINE 0.1MG TABLETS] 60 tablet      Sig: TAKE 1 TABLET BY MOUTH TWICE DAILY AS NEEDED WHEN SYSTOLIC BLOOD PRESSURE IS ABOVE 160 OR DIASTOLIC ABOVE 100 MMHG       Cardiovascular:  Alpha-2 Agonists - clonidine Passed - 8/11/2020 10:10 AM        Passed - Patient is at least 18 years old        Passed - Last BP in normal range within 360 days.     BP Readings from Last 3 Encounters:   07/31/20 119/60   06/24/20 135/72   06/12/20 (!) 162/80              Passed - Last Heart Rate in normal range within 360 days.     Pulse Readings from Last 3 Encounters:   07/31/20 89   06/24/20 67   06/02/20 68             Passed - Office visit in past 12 months or future 90 days.     Recent Outpatient Visits            1 week ago Acute bacterial sinusitis    [facility] PA-C    1 week ago Post-nasal drip    [facility] - Internal Medicine Henok Sawyer MD    2 weeks ago Anxiety    [facility] - Psychiatry Carol Ann Poe MD    3 weeks ago Osteoporosis, unspecified osteoporosis type, unspecified pathological fracture presence    [facility] - Orthopedics Armando Evans MD    1 month ago Osteoporosis, unspecified osteoporosis type, unspecified pathological fracture presence    [facility] - Rheumatology Yulisa Simental MD          Future Appointments              In 5 days Sadaf Vargas MD [facility] - Internal Medicine, [facility]    In 6 days MD Ward [facility] - Neurology, [place]    In 1 week MD Gail Sheehan - Cardiology, [facility]    In 1 week [facility] DEXA1 LIMIT 350 LBS [facility]    In 1 month Josefina Vega MD [facility] - Dermatology,  [place]    In 1 month APPOINTMENT LAB, [place] [facility]    In 2 months MD Ward Araujo - Rheumatology, [place]                      Appointments [id number] or future 3m with PCP    Date Provider   Last Visit   4/15/2020 Sadaf Vargas MD   Next Visit   8/11/2020 Sadaf Vargas MD   ED visits in past 90 days: 0     Note composed:11:25 AM 08/12/2020

## 2020-08-12 NOTE — TELEPHONE ENCOUNTER
----- Message from Karlee More sent at 8/12/2020  4:12 PM CDT -----  Contact: [phone]/Self  Patient is requesting to speak with nurse to schedule a new patient appointment for a Sinus Infection on 08/20/2020. Please advise.

## 2020-08-13 ENCOUNTER — TELEPHONE (OUTPATIENT)
Dept: PHARMACY | Facility: CLINIC | Age: 65
End: 2020-08-13

## 2020-08-13 ENCOUNTER — PATIENT OUTREACH (OUTPATIENT)
Dept: OTHER | Facility: OTHER | Age: 65
End: 2020-08-13

## 2020-08-13 DIAGNOSIS — M81.0 OSTEOPOROSIS, UNSPECIFIED OSTEOPOROSIS TYPE, UNSPECIFIED PATHOLOGICAL FRACTURE PRESENCE: Primary | ICD-10-CM

## 2020-08-13 RX ORDER — TERIPARATIDE 250 UG/ML
20 INJECTION, SOLUTION SUBCUTANEOUS DAILY
Qty: 2.4 ML | Refills: 2 | Status: ON HOLD | OUTPATIENT
Start: 2020-08-13 | End: 2021-03-31

## 2020-08-13 NOTE — PROGRESS NOTES
Digital Medicine: Clinician Follow-Up    Called patient for routine follow up on blood pressure. Patient reports adherence to medication regimen daily and denies missed doses. Patient denies hypotensive s/s (lightheadedness, dizziness, nausea, fatigue); patient denies hypertensive s/s (SOB, CP, severe headaches, changes in vision, dizziness, fatigue).  She has only been taking amlodipine 5 mg daily rather than 10 mg as prescribed. She states she did not know her PCP had prescribed a higher dose in June.    The history is provided by the patient.      Review of patient's allergies indicates:   -- Etanercept -- Other (See Comments)    --  Other reaction(s): recurrent infections   -- Chloramphenicol sod succinate -- Hives and Other (See Comments)   -- Codeine -- Other (See Comments)    --  Other reaction(s): Stomach upset   -- Nickel sutures (surgical stainless steel) -- Dermatitis    --  Allergic contact dermatitis   -- Adhesive -- Rash    --  Other reaction(s): Rash  Care Team received high BP alert.      Hypertension    Readings are trending up due to she has not been taking her medication as prescribed.    Patient is not experiencing signs/symptoms of hypotension.  Patient is not experiencing signs/symptoms of hypertension.          Last 5 Patient Entered Readings                                      Current 30 Day Average: 144/73     Recent Readings 8/13/2020 8/13/2020 8/13/2020 8/12/2020 8/12/2020    SBP (mmHg) 159 124 155 145 159    DBP (mmHg) 76 62 80 75 75    Pulse 71 69 80 73 69                           Medication Adherence-Medication adherence was asssessed.    Patient reported missing medication: more than once a month and misunderstood dosing of amlodipine.        ASSESSMENT(S)  Patients BP average is 144/73 mmHg, which is above goal. Patient's BP goal is less than or equal to 130/80 per 2017 ACC/AHA Hypertension Guidelines.       Hypertension Plan  Medication change. Patient will start taking amlodipine  10mg daily  Continue current diet/physical activity routine.       Addressed any questions or concerns and patient has my contact information if needed prior to next outreach. Patient verbalizes understanding.      Explained to the patient that the Digital Medicine team is not available for emergencies. Advised patient call [facility] ([phone] or [phone]) or [phone] if needed.         There are no preventive care reminders to display for this patient.      Hypertension Medications             amLODIPine (NORVASC) 10 MG tablet Take 1 tablet (10 mg total) by mouth once daily.    cloNIDine (CATAPRES) 0.1 MG tablet TAKE 1 TABLET BY MOUTH TWICE DAILY AS NEEDED WHEN SYSTOLIC BLOOD PRESSURE IS ABOVE 160 OR DIASTOLIC ABOVE 100 MMHG    lisinopril 10 MG tablet Take 10 mg by mouth daily as needed.     metoprolol succinate (TOPROL-XL) 25 MG 24 hr tablet Take 0.5 tablets (12.5 mg total) by mouth once daily.

## 2020-08-14 NOTE — TELEPHONE ENCOUNTER
DOCUMENTATION ONLY:  Prior authorization for Forteo approved from 8/14/20 to 12/31/20    Case Id: [id number]    Co-pay: $755.70 - patient assistance is being researched.

## 2020-08-15 ENCOUNTER — PATIENT OUTREACH (OUTPATIENT)
Dept: ADMINISTRATIVE | Facility: OTHER | Age: 65
End: 2020-08-15

## 2020-08-16 NOTE — PROGRESS NOTES
Requested updates from Care Everywhere.  Reviewed chart for overdue TARYN topics.  Updated Health Maintenance.   Reconciled immunizations in LINKS.

## 2020-08-17 ENCOUNTER — OFFICE VISIT (OUTPATIENT)
Dept: INTERNAL MEDICINE | Facility: CLINIC | Age: 65
End: 2020-08-17
Payer: MEDICARE

## 2020-08-17 ENCOUNTER — PATIENT OUTREACH (OUTPATIENT)
Dept: OTHER | Facility: OTHER | Age: 65
End: 2020-08-17

## 2020-08-17 VITALS
OXYGEN SATURATION: 94 % | BODY MASS INDEX: 18.33 KG/M2 | DIASTOLIC BLOOD PRESSURE: 70 MMHG | HEIGHT: 62 IN | HEART RATE: 75 BPM | WEIGHT: 99.63 LBS | SYSTOLIC BLOOD PRESSURE: 158 MMHG

## 2020-08-17 DIAGNOSIS — Z12.31 ENCOUNTER FOR SCREENING MAMMOGRAM FOR MALIGNANT NEOPLASM OF BREAST: Primary | ICD-10-CM

## 2020-08-17 DIAGNOSIS — J41.1 MUCOPURULENT CHRONIC BRONCHITIS: ICD-10-CM

## 2020-08-17 DIAGNOSIS — J42 CHRONIC BRONCHITIS, UNSPECIFIED CHRONIC BRONCHITIS TYPE: ICD-10-CM

## 2020-08-17 DIAGNOSIS — M54.50 LEFT-SIDED LOW BACK PAIN WITHOUT SCIATICA, UNSPECIFIED CHRONICITY: ICD-10-CM

## 2020-08-17 DIAGNOSIS — M25.552 LEFT HIP PAIN: ICD-10-CM

## 2020-08-17 DIAGNOSIS — I10 ESSENTIAL HYPERTENSION: Primary | Chronic | ICD-10-CM

## 2020-08-17 PROCEDURE — 1100F PTFALLS ASSESS-DOCD GE2>/YR: CPT | Mod: CPTII,S$GLB,, | Performed by: INTERNAL MEDICINE

## 2020-08-17 PROCEDURE — 3078F PR MOST RECENT DIASTOLIC BLOOD PRESSURE < 80 MM HG: ICD-10-PCS | Mod: CPTII,S$GLB,, | Performed by: INTERNAL MEDICINE

## 2020-08-17 PROCEDURE — 99214 PR OFFICE/OUTPT VISIT, EST, LEVL IV, 30-39 MIN: ICD-10-PCS | Mod: S$GLB,,, | Performed by: INTERNAL MEDICINE

## 2020-08-17 PROCEDURE — 99499 UNLISTED E&M SERVICE: CPT | Mod: S$GLB,,, | Performed by: INTERNAL MEDICINE

## 2020-08-17 PROCEDURE — 1125F AMNT PAIN NOTED PAIN PRSNT: CPT | Mod: S$GLB,,, | Performed by: INTERNAL MEDICINE

## 2020-08-17 PROCEDURE — 3078F DIAST BP <80 MM HG: CPT | Mod: CPTII,S$GLB,, | Performed by: INTERNAL MEDICINE

## 2020-08-17 PROCEDURE — 1159F MED LIST DOCD IN RCRD: CPT | Mod: S$GLB,,, | Performed by: INTERNAL MEDICINE

## 2020-08-17 PROCEDURE — 3077F PR MOST RECENT SYSTOLIC BLOOD PRESSURE >= 140 MM HG: ICD-10-PCS | Mod: CPTII,S$GLB,, | Performed by: INTERNAL MEDICINE

## 2020-08-17 PROCEDURE — 3077F SYST BP >= 140 MM HG: CPT | Mod: CPTII,S$GLB,, | Performed by: INTERNAL MEDICINE

## 2020-08-17 PROCEDURE — 99214 OFFICE O/P EST MOD 30 MIN: CPT | Mod: S$GLB,,, | Performed by: INTERNAL MEDICINE

## 2020-08-17 PROCEDURE — 3288F PR FALLS RISK ASSESSMENT DOCUMENTED: ICD-10-PCS | Mod: CPTII,S$GLB,, | Performed by: INTERNAL MEDICINE

## 2020-08-17 PROCEDURE — 3008F BODY MASS INDEX DOCD: CPT | Mod: CPTII,S$GLB,, | Performed by: INTERNAL MEDICINE

## 2020-08-17 PROCEDURE — 1100F PR PT FALLS ASSESS DOC 2+ FALLS/FALL W/INJURY/YR: ICD-10-PCS | Mod: CPTII,S$GLB,, | Performed by: INTERNAL MEDICINE

## 2020-08-17 PROCEDURE — 3288F FALL RISK ASSESSMENT DOCD: CPT | Mod: CPTII,S$GLB,, | Performed by: INTERNAL MEDICINE

## 2020-08-17 PROCEDURE — 99999 PR PBB SHADOW E&M-EST. PATIENT-LVL IV: CPT | Mod: PBBFAC,,, | Performed by: INTERNAL MEDICINE

## 2020-08-17 PROCEDURE — 3008F PR BODY MASS INDEX (BMI) DOCUMENTED: ICD-10-PCS | Mod: CPTII,S$GLB,, | Performed by: INTERNAL MEDICINE

## 2020-08-17 PROCEDURE — 99999 PR PBB SHADOW E&M-EST. PATIENT-LVL IV: ICD-10-PCS | Mod: PBBFAC,,, | Performed by: INTERNAL MEDICINE

## 2020-08-17 PROCEDURE — 1125F PR PAIN SEVERITY QUANTIFIED, PAIN PRESENT: ICD-10-PCS | Mod: S$GLB,,, | Performed by: INTERNAL MEDICINE

## 2020-08-17 PROCEDURE — 99499 RISK ADDL DX/OHS AUDIT: ICD-10-PCS | Mod: S$GLB,,, | Performed by: INTERNAL MEDICINE

## 2020-08-17 PROCEDURE — 1159F PR MEDICATION LIST DOCUMENTED IN MEDICAL RECORD: ICD-10-PCS | Mod: S$GLB,,, | Performed by: INTERNAL MEDICINE

## 2020-08-17 RX ORDER — METOPROLOL SUCCINATE 25 MG/1
25 TABLET, EXTENDED RELEASE ORAL DAILY
Qty: 30 TABLET | Refills: 11 | Status: SHIPPED | OUTPATIENT
Start: 2020-08-17 | End: 2020-08-26 | Stop reason: DRUGHIGH

## 2020-08-17 RX ORDER — LISINOPRIL 10 MG/1
10 TABLET ORAL DAILY
Qty: 90 TABLET | Refills: 1 | Status: ON HOLD
Start: 2020-08-17 | End: 2021-03-31 | Stop reason: HOSPADM

## 2020-08-17 NOTE — PROGRESS NOTES
Digital Medicine: Clinician Follow-Up    Called patient for to answer a question and for routine follow up on blood pressure. Patient reports adherence to medication regimen daily and denies missed doses. Patient denies hypotensive s/s (lightheadedness, dizziness, nausea, fatigue); patient denies hypertensive s/s (SOB, CP, severe headaches, changes in vision, dizziness, fatigue).  Patient was confused about how to take her medication. Used teach back to assess understanding.      The history is provided by the patient.      Review of patient's allergies indicates:   -- Etanercept -- Other (See Comments)    --  Other reaction(s): recurrent infections   -- Chloramphenicol sod succinate -- Hives and Other (See Comments)   -- Codeine -- Other (See Comments)    --  Other reaction(s): Stomach upset   -- Nickel sutures (surgical stainless steel) -- Dermatitis    --  Allergic contact dermatitis   -- Adhesive -- Rash    --  Other reaction(s): Rash  Follow-up reason(s): medication change follow-up and addressing patient questions/concerns.     Hypertension    Readings are trending down       Last 5 Patient Entered Readings                                      Current 30 Day Average: 139/72     Recent Readings 8/17/2020 8/17/2020 8/17/2020 8/16/2020 8/16/2020    SBP (mmHg) 135 125 118 134 136    DBP (mmHg) 66 67 62 62 69    Pulse 71 86 87 73 69             Screenings    ASSESSMENT(S)  Patients BP average is 139/72 mmHg, which is above goal. Patient's BP goal is less than or equal to 130/80 per 2017 ACC/AHA Hypertension Guidelines.       Hypertension Plan  Continue current therapy.  Continue current diet/physical activity routine.       Addressed any questions or concerns and patient has my contact information if needed prior to next outreach. Patient verbalizes understanding.      Explained to the patient that the Digital Medicine team is not available for emergencies. Advised patient call [facility] ([phone] or  [phone]) or [phone] if needed.         There are no preventive care reminders to display for this patient.      Hypertension Medications             metoprolol succinate (TOPROL-XL) 25 MG 24 hr tablet Take 1 tablet (25 mg total) by mouth once daily.    amLODIPine (NORVASC) 10 MG tablet Take 1 tablet (10 mg total) by mouth once daily.    cloNIDine (CATAPRES) 0.1 MG tablet TAKE 1 TABLET BY MOUTH TWICE DAILY AS NEEDED WHEN SYSTOLIC BLOOD PRESSURE IS ABOVE 160 OR DIASTOLIC ABOVE 100 MMHG    lisinopril 10 MG tablet Take 10 mg by mouth daily as needed.

## 2020-08-17 NOTE — PROGRESS NOTES
Subjective:       Patient ID: Bhavna Landry is a 65 y.o. female.    Chief Complaint: Follow-up  hip pain.   HPI   BP has been erratic.  158/70 today.  Home readings 183//61, 125/67.  Taking amlopodine 10 mg bid.  Catapres prn, metoprolol 12.5 mg daily    C/o pain L lower back pain and hip, into groin.  She has had a number of injections for sciatica, but this pain is different.  .Xray showed mild arthritic changes of L hip..    Pain prevents her from walking.  She is in pain all the time.  Would like handicapped license.    Sw Dr Evans 7/16 - she couldn't afford massage device.  Stretching not helpful.  PT not helpful.  See my April 15 note.  Taking half hydrocodone 3 times a day.  Helps a little.  Whole tab doesn't give her more relief.  Tylenol effectiveness varies.  She is taking gabapentin.     She has chronic bronchitis.  She was worried about covid due to chronic cough, which has since improved.  Covid testing negative.    Review of Systems   Constitutional: Negative for fever and unexpected weight change.   HENT: Negative for nasal congestion and postnasal drip.    Eyes: Negative for pain, discharge and visual disturbance.   Respiratory: Negative for cough, chest tightness, shortness of breath and wheezing.    Cardiovascular: Negative for chest pain and leg swelling.   Gastrointestinal: Negative for abdominal pain, constipation, diarrhea and nausea.   Genitourinary: Negative for difficulty urinating, dysuria and hematuria.   Integumentary:  Negative for rash.   Neurological: Negative for headaches.   Psychiatric/Behavioral: Negative for dysphoric mood and sleep disturbance. The patient is not nervous/anxious.          Objective:      Physical Exam  Constitutional:       Appearance: Normal appearance. She is obese.   Musculoskeletal:      Left hip: She exhibits tenderness (lateral superior humerus). She exhibits normal range of motion and normal strength.   Neurological:      General: No focal  deficit present.      Mental Status: She is alert and oriented to person, place, and time.   Psychiatric:         Mood and Affect: Mood normal.         Behavior: Behavior normal.         160/84  Assessment:       1. Encounter for screening mammogram for malignant neoplasm of breast    2. Left hip pain    3. Left-sided low back pain without sciatica, unspecified chronicity    4. Mucopurulent chronic bronchitis    5. Chronic bronchitis, unspecified chronic bronchitis type        Plan:       Bhavna was seen today for follow-up.    Diagnoses and all orders for this visit:    Encounter for screening mammogram for malignant neoplasm of breast  -     Mammo Digital Screening Bilat w/ [facility]; Future    Left hip pain  -     Ambulatory referral/consult to Physical Medicine Rehab; Future    Left-sided low back pain without sciatica, unspecified chronicity  -     Ambulatory referral/consult to Physical Medicine Rehab; Future    Mucopurulent chronic bronchitis    Chronic bronchitis, unspecified chronic bronchitis type    Other orders  -     metoprolol succinate (TOPROL-XL) 25 MG 24 hr tablet; Take 1 tablet (25 mg total) by mouth once daily.         Stop hydrocodone, as no clear benefit.  Take ES tylenol tid    Will see if Phys Med and Rehab can help.    Use walker

## 2020-08-19 ENCOUNTER — TELEPHONE (OUTPATIENT)
Dept: INTERNAL MEDICINE | Facility: CLINIC | Age: 65
End: 2020-08-19

## 2020-08-19 ENCOUNTER — TELEPHONE (OUTPATIENT)
Dept: RHEUMATOLOGY | Facility: CLINIC | Age: 65
End: 2020-08-19

## 2020-08-19 ENCOUNTER — TELEPHONE (OUTPATIENT)
Dept: NEUROLOGY | Facility: CLINIC | Age: 65
End: 2020-08-19

## 2020-08-19 RX ORDER — TRAMADOL HYDROCHLORIDE 50 MG/1
TABLET ORAL
Qty: 40 EACH | Refills: 0 | Status: SHIPPED | OUTPATIENT
Start: 2020-08-19 | End: 2020-10-21 | Stop reason: SDUPTHER

## 2020-08-19 NOTE — TELEPHONE ENCOUNTER
----- Message from Salena Moses sent at 8/19/2020 12:05 PM CDT -----  Contact: Bhavna @ [phone]  Requesting an RX refill or new RX.  Is this a refill or new RX:  Refill  RX name and strength: tramadol (ULTRAM) 50 mg tablet   Directions (copy/paste from chart):  Take 50 mg by mouth as needed. Every 6-8 hours prn   Is this a 30 day or 90 day RX:  30  Local pharmacy or mail order pharmacy: local   Pharmacy name and phone # (copy/paste from chart): [facility]   [phone] (Phone)  [phone] (Fax)       Comments:  The Tylenol XS do not work for her. She has a  pain management appointment in September.

## 2020-08-19 NOTE — TELEPHONE ENCOUNTER
Patient rescheduled to November due to provider not having clinic in October. An appointment will be mail to home address.

## 2020-08-19 NOTE — TELEPHONE ENCOUNTER
----- Message from Jerson Lopez sent at 8/18/2020  4:39 PM CDT -----  Contact: pt @ [phone]  Pt calling to reschedule canceled appt on 08/18/20 to October

## 2020-08-22 ENCOUNTER — HOSPITAL ENCOUNTER (EMERGENCY)
Facility: HOSPITAL | Age: 65
Discharge: HOME | End: 2020-08-22
Attending: EMERGENCY MEDICINE
Payer: MEDICARE

## 2020-08-22 VITALS
BODY MASS INDEX: 18.03 KG/M2 | DIASTOLIC BLOOD PRESSURE: 77 MMHG | SYSTOLIC BLOOD PRESSURE: 181 MMHG | WEIGHT: 98 LBS | HEART RATE: 69 BPM | TEMPERATURE: 98 F | HEIGHT: 62 IN | RESPIRATION RATE: 16 BRPM | OXYGEN SATURATION: 99 %

## 2020-08-22 DIAGNOSIS — R42 VERTIGO: Primary | ICD-10-CM

## 2020-08-22 DIAGNOSIS — R42 DIZZINESS: ICD-10-CM

## 2020-08-22 LAB
BASOPHILS # BLD AUTO: 0.07 K/UL (ref 0–0.2)
BASOPHILS NFR BLD: 0.8 % (ref 0–1.9)
BUN SERPL-MCNC: 6 MG/DL (ref 6–30)
CHLORIDE SERPL-SCNC: 97 MMOL/L (ref 95–110)
CREAT SERPL-MCNC: 0.8 MG/DL (ref 0.5–1.4)
DIFFERENTIAL METHOD: ABNORMAL
EOSINOPHIL # BLD AUTO: 0.2 K/UL (ref 0–0.5)
EOSINOPHIL NFR BLD: 2.2 % (ref 0–8)
ERYTHROCYTE [DISTWIDTH] IN BLOOD BY AUTOMATED COUNT: 12.7 % (ref 11.5–14.5)
GLUCOSE SERPL-MCNC: 100 MG/DL (ref 70–110)
HCT VFR BLD AUTO: 37.3 % (ref 37–48.5)
HCT VFR BLD CALC: 39 %PCV (ref 36–54)
HGB BLD-MCNC: 12.1 G/DL (ref 12–16)
IMM GRANULOCYTES # BLD AUTO: 0.05 K/UL (ref 0–0.04)
IMM GRANULOCYTES NFR BLD AUTO: 0.5 % (ref 0–0.5)
LYMPHOCYTES # BLD AUTO: 2.5 K/UL (ref 1–4.8)
LYMPHOCYTES NFR BLD: 27.5 % (ref 18–48)
MCH RBC QN AUTO: 33.1 PG (ref 27–31)
MCHC RBC AUTO-ENTMCNC: 32.4 G/DL (ref 32–36)
MCV RBC AUTO: 102 FL (ref 82–98)
MONOCYTES # BLD AUTO: 0.7 K/UL (ref 0.3–1)
MONOCYTES NFR BLD: 7.3 % (ref 4–15)
NEUTROPHILS # BLD AUTO: 5.7 K/UL (ref 1.8–7.7)
NEUTROPHILS NFR BLD: 61.7 % (ref 38–73)
NRBC BLD-RTO: 0 /100 WBC
PLATELET # BLD AUTO: 319 K/UL (ref 150–350)
PMV BLD AUTO: 8.5 FL (ref 9.2–12.9)
POC IONIZED CALCIUM: 1.26 MMOL/L (ref 1.06–1.42)
POC TCO2 (MEASURED): 25 MMOL/L (ref 23–29)
POTASSIUM BLD-SCNC: 4.4 MMOL/L (ref 3.5–5.1)
RBC # BLD AUTO: 3.66 M/UL (ref 4–5.4)
SAMPLE: ABNORMAL
SARS-COV-2 RDRP RESP QL NAA+PROBE: NEGATIVE
SODIUM BLD-SCNC: 131 MMOL/L (ref 136–145)
WBC # BLD AUTO: 9.22 K/UL (ref 3.9–12.7)

## 2020-08-22 PROCEDURE — 93010 ELECTROCARDIOGRAM REPORT: CPT | Mod: ,,, | Performed by: INTERNAL MEDICINE

## 2020-08-22 PROCEDURE — 99284 PR EMERGENCY DEPT VISIT,LEVEL IV: ICD-10-PCS | Mod: ,,, | Performed by: PHYSICIAN ASSISTANT

## 2020-08-22 PROCEDURE — 96374 THER/PROPH/DIAG INJ IV PUSH: CPT

## 2020-08-22 PROCEDURE — 93005 ELECTROCARDIOGRAM TRACING: CPT

## 2020-08-22 PROCEDURE — 93010 EKG 12-LEAD: ICD-10-PCS | Mod: ,,, | Performed by: INTERNAL MEDICINE

## 2020-08-22 PROCEDURE — 80047 BASIC METABLC PNL IONIZED CA: CPT

## 2020-08-22 PROCEDURE — U0002 COVID-19 LAB TEST NON-CDC: HCPCS

## 2020-08-22 PROCEDURE — 99284 EMERGENCY DEPT VISIT MOD MDM: CPT | Mod: ,,, | Performed by: PHYSICIAN ASSISTANT

## 2020-08-22 PROCEDURE — 63600175 PHARM REV CODE 636 W HCPCS: Performed by: PHYSICIAN ASSISTANT

## 2020-08-22 PROCEDURE — 25000003 PHARM REV CODE 250: Performed by: PHYSICIAN ASSISTANT

## 2020-08-22 PROCEDURE — 99285 EMERGENCY DEPT VISIT HI MDM: CPT | Mod: 25

## 2020-08-22 PROCEDURE — 85025 COMPLETE CBC W/AUTO DIFF WBC: CPT

## 2020-08-22 RX ORDER — ONDANSETRON 4 MG/1
4 TABLET, ORALLY DISINTEGRATING ORAL EVERY 8 HOURS PRN
Qty: 9 TABLET | Refills: 0 | Status: SHIPPED | OUTPATIENT
Start: 2020-08-22 | End: 2020-08-25

## 2020-08-22 RX ORDER — MECLIZINE HYDROCHLORIDE 25 MG/1
50 TABLET ORAL EVERY 12 HOURS PRN
Qty: 12 TABLET | Refills: 0 | Status: SHIPPED | OUTPATIENT
Start: 2020-08-22 | End: 2020-08-25

## 2020-08-22 RX ORDER — ONDANSETRON 2 MG/ML
4 INJECTION INTRAMUSCULAR; INTRAVENOUS
Status: COMPLETED | OUTPATIENT
Start: 2020-08-22 | End: 2020-08-22

## 2020-08-22 RX ORDER — MECLIZINE HCL 12.5 MG 12.5 MG/1
50 TABLET ORAL
Status: COMPLETED | OUTPATIENT
Start: 2020-08-22 | End: 2020-08-22

## 2020-08-22 RX ADMIN — ONDANSETRON 4 MG: 2 INJECTION INTRAMUSCULAR; INTRAVENOUS at 11:08

## 2020-08-22 RX ADMIN — MECLIZINE 50 MG: 12.5 TABLET ORAL at 11:08

## 2020-08-22 NOTE — ED TRIAGE NOTES
To the ED with c/o dizziness. Worsens with turning her head toward the left.  Improves with closing her eyes. Symptoms began this am.

## 2020-08-22 NOTE — Clinical Note
Bhavna Landry was seen and treated in our emergency department on 8/22/2020.  She may return to work on 08/22/2020.  MS Landry daughter here in the emergency room with her mother     If you have any questions or concerns, please don't hesitate to call.       RN

## 2020-08-22 NOTE — ED NOTES
LOC: The patient is awake, alert, and oriented to place, time, situation. Affect is appropriate.  Speech is appropriate and clear.     APPEARANCE: Patient resting uncomfortably, reporting dizziness that started this am,  in no acute distress.  Patient is clean and well groomed.    SKIN: The skin is warm and dry; color consistent with ethnicity.  Patient has normal skin turgor and moist mucus membranes.  Skin intact; no breakdown or bruising noted.     MUSCULOSKELETAL: Patient moving upper and lower extremities without difficulty.  Denies weakness.     RESPIRATORY: Airway is open and patent. Respirations spontaneous, even, easy, and non-labored.  Patient has a normal effort and rate.  No accessory muscle use noted. Denies cough.     CARDIAC:  Normal rhythm and rate noted.  No peripheral edema noted. No complaints of chest pain.      ABDOMEN: Soft and non tender to palpation.  No distention noted.     NEUROLOGIC: Eyes open spontaneously. Dizziness reported, eye with twitching. Dizziness resolves with closing her eyes. .  Behavior appropriate to situation.  Follows commands; facial expression symmetrical.  Purposeful motor response noted; normal sensation in all extremities.

## 2020-08-22 NOTE — Clinical Note
Bhavna Landry was seen and treated in our emergency department on 8/22/2020.  She may return to work on 08/22/2020.  Here in Emergency Dept with mother     If you have any questions or concerns, please don't hesitate to call.       RN

## 2020-08-22 NOTE — ED PROVIDER NOTES
Encounter Date: 8/22/2020       History     Chief Complaint   Patient presents with    Dizziness     Denies cough, fever, chills. Denies numbness, weakness, tingling. +Nausea.      Ms Landry is a 65yoF who presents for dizziness since this morning; pertinent PMHx amblyopia OD, fibromyalgia, psoriatic arthritis, osteoporosis, paranoid schizophrenia, HTN, HLD. Patient initially reports onset of dizziness when waking to use the bathroom at 0300. She used the bathroom, laid back down and felt better, returned to sleep. Symptoms continued upon waking at 0600 today. Significantly worsened with standing and walking- felt like she had to touch the wall to steady herself. Dizziness best described as floating. Also worsens with head movements. Symptoms are associated with mild nausea and posterior neck discomfort that feels like a muscle strain, B/L paraspinal. No trauma. No double vision.  She had URI symptoms last month with ear fullness of R ear. Reports almost complete resolution of symptoms with some fullness sensation still present in R ear. She denies associated CP, palpitations, SOB, abdominal pain. No recent fever/chills, ear pain, headache.  The patients available PMH, PSH, Social History, medications, allergies, and triage vital signs were reviewed just prior to their medical evaluation.  A ten point review of systems was completed and is negative except as documented above.  Patient denies any other acute medical complaint.    Please be advised this text was dictated with Visual Unity software and may contain errors due to translation.           Review of patient's allergies indicates:   Allergen Reactions    Etanercept Other (See Comments)     Other reaction(s): recurrent infections    Chloramphenicol sod succinate Hives and Other (See Comments)    Codeine Other (See Comments)     Other reaction(s): Stomach upset    Nickel sutures [surgical stainless steel] Dermatitis     Allergic contact dermatitis    Adhesive  Rash     Other reaction(s): Rash     Past Medical History:   Diagnosis Date    Allergy     Amblyopia     Anemia     Anticoagulant long-term use     Arthritis 1992    Cataract     Depression     Dry eyes     Dry mouth     Duane's syndrome of right eye     Fibromyalgia 2014    Fibromyalgia     Fractured hip     RIGHT HIP    GERD (gastroesophageal reflux disease)     History of psychiatric hospitalization     Hyperlipidemia 1992    Hypertension     Kidney stone     Migraine headache     Osteoporosis     Psoriatic arthritis 1992    Right knee pain     post knee replacement surgery (possible rejectiion of metal)    RLS (restless legs syndrome)     Schizophrenia 1992    stable on meds    Urinary tract infection      Past Surgical History:   Procedure Laterality Date    brow ptosis repair Right 2019    Surgeon: Dr. Karla Henson    CATARACT EXTRACTION       SECTION      COLONOSCOPY N/A 2016    Procedure: COLONOSCOPY;  Surgeon: ANDRIA Connelly MD;  Location: [address]);  Service: Endoscopy;  Laterality: N/A;    cyst removed from right sinus  1982    HYSTERECTOMY      VAGINAL HYSTERECTOMY WITHOUT BSO - ENDOMETRIOSIS    INJECTION OF ANESTHETIC AGENT AROUND MEDIAL BRANCH NERVES INNERVATING LUMBAR FACET JOINT N/A 2019    Procedure: Lumbo-sacral Block, DR5 and Lateral Branches of S1,S2, S3;  Surgeon: Michelle Pineda Jr., MD;  Location: [facility] PAIN MGT;  Service: Pain Management;  Laterality: N/A;  Pt takes  and states she holds ASA on her own whenever she has procedures.  Instructed to hold x 3 days prior to procedure.      INJECTION OF ANESTHETIC AGENT INTO SACROILIAC JOINT Right 2018    Procedure: BLOCK, SACROILIAC JOINT-Right- ORAL SEDATION;  Surgeon: Michelle Pineda Jr., MD;  Location: [facility] PAIN [facility]GT;  Service: Pain Management;  Laterality: Right;    INJECTION OF ANESTHETIC AGENT INTO SACROILIAC JOINT Bilateral  9/27/2018    Procedure: BLOCK, SACROILIAC JOINT-BILATERAL;  Surgeon: Michelle Pineda Jr., MD;  Location: [facility]IN [facility]GT;  Service: Pain Management;  Laterality: Bilateral;  Please keep at 10:00 due to trasnsportation    INJECTION OF ANESTHETIC AGENT INTO SACROILIAC JOINT Bilateral 2/7/2019    Procedure: Bilateral Sacroiliac Joint Injection - Per Dr Pineda, not necessary to hold ASA.;  Surgeon: Michelle Pineda Jr., MD;  Location: [facility] PAIN [facility]GT;  Service: Pain Management;  Laterality: Bilateral;    INTRAOCULAR PROSTHESES INSERTION Right 8/1/2019    Procedure: INSERTION, IOL PROSTHESIS;  Surgeon: Karen Song MD;  Location: [facility] OR [address] [place] [address];  Service: Ophthalmology;  Laterality: Right;    INTRAOCULAR PROSTHESES INSERTION Left 9/26/2019    Procedure: INSERTION, IOL PROSTHESIS;  Surgeon: Karen Song MD;  [facility];  Service: Ophthalmology;  Laterality: Left;    JOINT REPLACEMENT Right     knee    KNEE SURGERY      PHACOEMULSIFICATION OF CATARACT Right 8/1/2019    Procedure: PHACOEMULSIFICATION, CATARACT;  Surgeon: Karen Song MD;  [facility];  Service: Ophthalmology;  Laterality: Right;    PHACOEMULSIFICATION OF CATARACT Left 9/26/2019    Procedure: PHACOEMULSIFICATION, CATARACT;  Surgeon: Karen Song MD;  [facility];  Service: Ophthalmology;  Laterality: Left;    RADIOFREQUENCY THERMOCOAGULATION Right 5/7/2019    Procedure: RADIOFREQUENCY THERMAL COAGULATION RIGHT DORSAL RAMUS 5 AND LATERAL BRANCH OF S1, S2 AND S3;  Surgeon: Michelle Pineda Jr., MD;  Location: [facility] PAIN [facility]GT;  Service: Pain Management;  Laterality: Right;    RADIOFREQUENCY THERMOCOAGULATION Left 5/14/2019    Procedure: RADIOFREQUENCY THERMAL COAGULATION LEFT DORSAL RAMUS 5 AND LATERAL BRANCH OF S1,S2 AND S3;  Surgeon: Michelle Pineda Jr., MD;  Location: [facility] PAIN [facility]GT;  Service: Pain Management;  Laterality: Left;    RADIOFREQUENCY THERMOCOAGULATION Right 10/22/2019     Procedure: RADIOFREQUENCY THERMAL COAGULATION---DARSAL RAMUS 5 and LATERAL S1,S2,and S3 Right;  Surgeon: Michelle Pineda Jr., MD;  Location: [facility];  Service: Pain Management;  Laterality: Right;  patient to sign consent DOS    RADIOFREQUENCY THERMOCOAGULATION Left 10/29/2019    Procedure: RADIOFREQUENCY THERMAL COAGULATION - LEFT - DR5, S1,S2, AND S3;  Surgeon: Michelle Pineda Jr., MD;  Location: [facility];  Service: Pain Management;  Laterality: Left;    RADIOFREQUENCY THERMOCOAGULATION Left 2020    Procedure: RADIOFREQUENCY THERMAL COAGULATION--Left DR5+ lateral branches of S1, S2, S3;  Surgeon: Michelle Pineda Jr., MD;  Location: [facility];  Service: Pain Management;  Laterality: Left;    RADIOFREQUENCY THERMOCOAGULATION Right 2020    Procedure: RADIOFREQUENCY THERMAL COAGULATION--Right DR5+ lateral branches of S1, S2, S3;  Surgeon: Michelle Pineda Jr., MD;  Location: [facility];  Service: Pain Management;  Laterality: Right;    Surgery on right knee  1982    tumor removed from back left side upper shoulder  2006     Family History   Problem Relation Age of Onset    Colon cancer Mother     Psoriasis Mother     Cancer Mother         colon    Depression Mother     Diabetes Brother     Arthritis Brother     Heart disease Brother         cad    Cancer Father         lymphoma    Stroke Sister     No Known Problems Daughter     Hypertension Neg Hx     Suicide Neg Hx     Amblyopia Neg Hx     Blindness Neg Hx     Cataracts Neg Hx     Glaucoma Neg Hx     Macular degeneration Neg Hx     Retinal detachment Neg Hx     Strabismus Neg Hx      Social History     Tobacco Use    Smoking status: Former Smoker     Packs/day: 0.50     Years: 10.00     Pack years: 5.00     Types: Cigarettes     Quit date: 2012     Years since quittin.2    Smokeless tobacco: Former User    Tobacco comment: stopped smoking .     Substance Use Topics    Alcohol use:  No     Frequency: Never    Drug use: No     Review of Systems   Constitutional: Negative for chills and fever.   HENT: Negative for ear discharge, ear pain, facial swelling, rhinorrhea, sinus pressure, sinus pain, sore throat and trouble swallowing. Congestion: improved.         R ear fullness   Eyes: Positive for visual disturbance (d/t dizziness). Negative for photophobia and pain.   Respiratory: Negative for cough, chest tightness and shortness of breath.    Cardiovascular: Negative for chest pain and palpitations.   Gastrointestinal: Positive for nausea. Negative for abdominal pain, constipation, diarrhea and vomiting.   Musculoskeletal: Positive for neck pain. Negative for neck stiffness.   Skin: Negative for pallor and rash.   Neurological: Positive for dizziness. Negative for syncope, speech difficulty, weakness, light-headedness and numbness.   Psychiatric/Behavioral: Negative for confusion.       Physical Exam     Initial Vitals [08/22/20 0953]   BP Pulse Resp Temp SpO2   (!) 129/58 69 16 97.8 °F (36.6 °C) (!) 94 %      MAP       --         Physical Exam    Vitals reviewed.  Constitutional: She appears well-developed and well-nourished. She is not diaphoretic. No distress.   HENT:   Head: Normocephalic and atraumatic.   Right Ear: External ear normal.   Left Ear: External ear normal.   Mouth/Throat: Oropharynx is clear and moist. No oropharyngeal exudate.   TMs with chronic inflammatory change B/L, no effusion or infection   Eyes: Conjunctivae are normal. Pupils are equal, round, and reactive to light. No scleral icterus.   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 Neck supple.   TTP B/L paracervical musculature   Cardiovascular: Normal rate, regular rhythm and intact distal pulses.   Pulmonary/Chest: Breath sounds normal. No respiratory  "distress. She has no wheezes. She has no rhonchi. She has no rales.   Abdominal: Soft. Bowel sounds are normal. There is no abdominal tenderness.   Musculoskeletal: Normal range of motion. No edema.   Neurological: She is alert and oriented to person, place, and time. She has normal strength. No cranial nerve deficit or sensory deficit.   No other focal or global neuro deficits  Gait unsteady "had to hold the wall", no truncal ataxia with sitting upright   Skin: Skin is warm and dry. Capillary refill takes less than 2 seconds. No rash noted. No erythema. No pallor.   Psychiatric: She has a normal mood and affect. Her behavior is normal. Judgment and thought content normal.         ED Course   Procedures  Labs Reviewed   CBC W/ AUTO DIFFERENTIAL - Abnormal; Notable for the following components:       Result Value    RBC 3.66 (*)     Mean Corpuscular Volume 102 (*)     Mean Corpuscular Hemoglobin 33.1 (*)     MPV 8.5 (*)     Immature Grans (Abs) 0.05 (*)     All other components within normal limits   ISTAT PROCEDURE - Abnormal; Notable for the following components:    POC Sodium 131 (*)     All other components within normal limits   SARS-COV-2 RNA AMPLIFICATION, QUAL   ISTAT CHEM8          Imaging Results          MRI Brain Without Contrast (Final result)  Result time 08/22/20 16:41:17    Final result by Dale Hernandez MD (08/22/20 16:41:17)                 Impression:      No acute intracranial process.      Electronically signed by: Dale Hernandez MD  Date:    08/22/2020  Time:    16:41             Narrative:    EXAMINATION:  MRI BRAIN WITHOUT CONTRAST    CLINICAL HISTORY:  Dizziness, non-specific;suspected vertiginous, no change after meclizine, continued nystagmus;    TECHNIQUE:  Multiplanar multisequence MR imaging of the brain was performed without contrast.    COMPARISON:  CT scan of the head dated 10/19/2018.    FINDINGS:  The craniocervical junction is within normal limits.  The sellar and parasellar structures " are within normal limits.  The intracranial flow voids are within normal limits.    No diffusion-weighted signal abnormality is present.  The ventricles and sulci are within normal limits for the patient's age.  There are minimal scattered T2/FLAIR signal hyperintensities within the periventricular white matter.  There are no extra-axial fluid collections.  There is no evidence of intracranial hemorrhage.  There is no evidence of mass effect.    There are postoperative changes in the orbits.  The paranasal sinuses unremarkable.  The mastoid air cells are clear.  The calvarium is intact.                                 Medical Decision Making:   History:   I obtained history from: someone other than patient.       <> Summary of History: Daughter at bedside  Old Medical Records: I decided to obtain old medical records.  Old Records Summarized: records from clinic visits and records from previous admission(s).  Initial Assessment:   Patient presents with acute vertiginous dizziness with torsional nystagmus, no other acute neuro findings.  No infectious symptoms, VSS, afebrile  Differential Diagnosis:   DDx peripheral (BPPV) vs less likely central vertigo. Physical exam and history taking lower clinical suspicion for electrolyte disturbance, cardiac cause, encephalopathy.   Clinical Tests:   Lab Tests: Ordered and Reviewed  Radiological Study: Ordered and Reviewed  Medical Tests: Ordered and Reviewed  ED Management:  EKG shows 1st degree AV block without acute ischemic changes. Given 50mg meclizine. No electrolyte disturbance.  Update: on re-eval, patient reports subjective improvement though dizziness still present.  Torsional nystagmus mildly decreased, though still present and not fatigable.  MRI ordered.  Update: MRI without acute findings. On re-eval, patient reports continued improvement, ambulates at baseline per family member at bedside.  Nystagmus still present though continued improvement.  I discussed case  with Neurology and vascular Neurology given continued presence of nystagmus.  Both teams have very strong suspicion for peripheral involvement given exam and workup.  They do not recommend additional workup at this time.  RT was in agreement.  Recommend follow-up to Neurology this week.  Referral placed.  Given meclizine for home use. Patient agreed to plan of care and voiced understanding. Discharged in stable condition with strict ED return precautions.    Karlee Tran PA-C  08/22/2020    I discussed the following case, diagnosis and plan of care with attending physician.    Other:   I have discussed this case with another health care provider.                   ED Course as of Aug 22 1846   Sat Aug 22, 2020   1433 MRI Brain Without Contrast [MF]   1434 MRI in one hour per MRI Tech    [MF]   1708 Temp src: Oral []      ED Course User Index  [MF] Karlee Tran PA-C                Clinical Impression:       ICD-10-CM ICD-9-CM   1. Vertigo  R42 780.4   2. Dizziness  R42 780.4         Disposition:   Disposition: Discharged  Condition: Stable     ED Disposition Condition    Discharge Stable        ED Prescriptions     Medication Sig Dispense Start Date End Date Auth. Provider    meclizine (ANTIVERT) 25 mg tablet Take 2 tablets (50 mg total) by mouth every 12 (twelve) hours as needed for Dizziness. 12 tablet 8/22/2020 8/25/2020 Karlee Tran PA-C    ondansetron (ZOFRAN-ODT) 4 MG TbDL Take 1 tablet (4 mg total) by mouth every 8 (eight) hours as needed. 9 tablet 8/22/2020 8/25/2020 Karlee Tran PA-C        Follow-up Information     Follow up With Specialties Details Why Contact Info Additional Information    [facility] Emergency Medicine Go to  Return to the ER immediately, If symptoms worsen or new symptoms occur [address] [address] [phone]     [facility]   [address]  [address]  [phone] [facility] [place] - Main Building, 7th Floor Please park in [facility] and take Clinic elevator                                     Karlee Tran PA-C  08/22/20 1477

## 2020-08-22 NOTE — DISCHARGE INSTRUCTIONS
Take medication as prescribed.  Follow-up with Neurology (attached is clinic phone number).  Return emergency department if you develop any different or worsening symptoms, persistent nausea/uncontrollable vomiting or headache.    Our goal in the emergency department is to always give you outstanding care and exceptional service. You may receive a survey by mail or e-mail in the next week regarding your experience in our ED. We would greatly appreciate your completing and returning the survey. Your feedback provides us with a way to recognize our staff who give very good care and it helps us learn how to improve when your experience was below our aspiration of excellence.

## 2020-08-22 NOTE — Clinical Note
Vidhi Haywood accompanied Bhavna Landry to the emergency department on 8/22/2020. They may return to work on 08/22/2020.      If you have any questions or concerns, please don't hesitate to call.      Nicole Cortés RN RN

## 2020-08-24 ENCOUNTER — TELEPHONE (OUTPATIENT)
Dept: NEUROLOGY | Facility: CLINIC | Age: 65
End: 2020-08-24

## 2020-08-24 NOTE — TELEPHONE ENCOUNTER
Left a message for the patient to let her know an appointment is scheduled for her on 8/26 at 8:00. The patient was asked to call back to reschedule if this is not a good day and time for her.

## 2020-08-24 NOTE — TELEPHONE ENCOUNTER
----- Message from Salvador Dash sent at 8/24/2020  8:12 AM CDT -----  Type:  Same Day Appointment Request    Caller is requesting a same day appointment.  Caller declined first available appointment listed below.    Name of Caller: Pt   When is the first available appointment?   Symptoms: Vergo , infection in ear   Best Call Back Number: [phone]  Additional Information:  Patient would like to be seen for a hospital f/u. Please Advise.

## 2020-08-26 ENCOUNTER — OFFICE VISIT (OUTPATIENT)
Dept: NEUROLOGY | Facility: CLINIC | Age: 65
End: 2020-08-26
Payer: MEDICARE

## 2020-08-26 ENCOUNTER — PATIENT OUTREACH (OUTPATIENT)
Dept: OTHER | Facility: OTHER | Age: 65
End: 2020-08-26

## 2020-08-26 VITALS
BODY MASS INDEX: 17.92 KG/M2 | HEIGHT: 62 IN | HEART RATE: 52 BPM | DIASTOLIC BLOOD PRESSURE: 51 MMHG | SYSTOLIC BLOOD PRESSURE: 127 MMHG

## 2020-08-26 DIAGNOSIS — I10 ESSENTIAL HYPERTENSION: Primary | Chronic | ICD-10-CM

## 2020-08-26 DIAGNOSIS — R42 VERTIGO: ICD-10-CM

## 2020-08-26 PROCEDURE — 3078F PR MOST RECENT DIASTOLIC BLOOD PRESSURE < 80 MM HG: ICD-10-PCS | Mod: CPTII,S$GLB,, | Performed by: PSYCHIATRY & NEUROLOGY

## 2020-08-26 PROCEDURE — 1159F MED LIST DOCD IN RCRD: CPT | Mod: S$GLB,,, | Performed by: PSYCHIATRY & NEUROLOGY

## 2020-08-26 PROCEDURE — 1101F PR PT FALLS ASSESS DOC 0-1 FALLS W/OUT INJ PAST YR: ICD-10-PCS | Mod: CPTII,S$GLB,, | Performed by: PSYCHIATRY & NEUROLOGY

## 2020-08-26 PROCEDURE — 3074F SYST BP LT 130 MM HG: CPT | Mod: CPTII,S$GLB,, | Performed by: PSYCHIATRY & NEUROLOGY

## 2020-08-26 PROCEDURE — 1101F PT FALLS ASSESS-DOCD LE1/YR: CPT | Mod: CPTII,S$GLB,, | Performed by: PSYCHIATRY & NEUROLOGY

## 2020-08-26 PROCEDURE — 99999 PR PBB SHADOW E&M-EST. PATIENT-LVL IV: CPT | Mod: PBBFAC,,, | Performed by: PSYCHIATRY & NEUROLOGY

## 2020-08-26 PROCEDURE — 1126F PR PAIN SEVERITY QUANTIFIED, NO PAIN PRESENT: ICD-10-PCS | Mod: S$GLB,,, | Performed by: PSYCHIATRY & NEUROLOGY

## 2020-08-26 PROCEDURE — 1159F PR MEDICATION LIST DOCUMENTED IN MEDICAL RECORD: ICD-10-PCS | Mod: S$GLB,,, | Performed by: PSYCHIATRY & NEUROLOGY

## 2020-08-26 PROCEDURE — 3008F PR BODY MASS INDEX (BMI) DOCUMENTED: ICD-10-PCS | Mod: CPTII,S$GLB,, | Performed by: PSYCHIATRY & NEUROLOGY

## 2020-08-26 PROCEDURE — 1126F AMNT PAIN NOTED NONE PRSNT: CPT | Mod: S$GLB,,, | Performed by: PSYCHIATRY & NEUROLOGY

## 2020-08-26 PROCEDURE — 3008F BODY MASS INDEX DOCD: CPT | Mod: CPTII,S$GLB,, | Performed by: PSYCHIATRY & NEUROLOGY

## 2020-08-26 PROCEDURE — 99204 PR OFFICE/OUTPT VISIT, NEW, LEVL IV, 45-59 MIN: ICD-10-PCS | Mod: S$GLB,,, | Performed by: PSYCHIATRY & NEUROLOGY

## 2020-08-26 PROCEDURE — 99999 PR PBB SHADOW E&M-EST. PATIENT-LVL IV: ICD-10-PCS | Mod: PBBFAC,,, | Performed by: PSYCHIATRY & NEUROLOGY

## 2020-08-26 PROCEDURE — 3078F DIAST BP <80 MM HG: CPT | Mod: CPTII,S$GLB,, | Performed by: PSYCHIATRY & NEUROLOGY

## 2020-08-26 PROCEDURE — 99204 OFFICE O/P NEW MOD 45 MIN: CPT | Mod: S$GLB,,, | Performed by: PSYCHIATRY & NEUROLOGY

## 2020-08-26 PROCEDURE — 3074F PR MOST RECENT SYSTOLIC BLOOD PRESSURE < 130 MM HG: ICD-10-PCS | Mod: CPTII,S$GLB,, | Performed by: PSYCHIATRY & NEUROLOGY

## 2020-08-26 RX ORDER — METOPROLOL SUCCINATE 25 MG/1
12.5 TABLET, EXTENDED RELEASE ORAL DAILY
Qty: 45 TABLET | Refills: 1 | Status: SHIPPED | OUTPATIENT
Start: 2020-08-26 | End: 2020-08-31

## 2020-08-26 RX ORDER — MECLIZINE HCL 12.5 MG 12.5 MG/1
12.5 TABLET ORAL 2 TIMES DAILY PRN
Qty: 30 TABLET | Refills: 0 | Status: ON HOLD | OUTPATIENT
Start: 2020-08-26 | End: 2021-03-31

## 2020-08-26 NOTE — LETTER
August 26, 2020      Karlee Tran PA-C  [address]  [facility] [address]           [facility]  [address]  [facility] [address]  Phone: [phone]          Patient: Bhavna Landry   MR Number: [id number]   YOB: 1955   Date of Visit: 8/26/2020       Dear Karlee Tran:    Thank you for referring Bhavna Landry to me for evaluation. Attached you will find relevant portions of my assessment and plan of care.    If you have questions, please do not hesitate to call me. I look forward to following Bhavna Landry along with you.    Sincerely,    Rafat Jefferson MD    Enclosure  CC:  No Recipients    If you would like to receive this communication electronically, please contact [email or web address] or [phone] to request more information on [employer] access.    For providers and/or their staff who would like to refer a patient to [facility], please contact us through our one-stop-shop provider referral line, [facility] , at [phone].    If you feel you have received this communication in error or would no longer like to receive these types of communications, please e-mail [email or web address]

## 2020-08-26 NOTE — PROGRESS NOTES
"  Bhavna Landry is a 65 y.o. year old female that  presents with chief complain of vertigo.     HPI:  Mrs Landry has HTN, HLD, migraine, schizophrenia, depression,  RLS, Duane's syndrome R eye, osteoporosis, kidney stones, and new onset dizziness.  She endorses new onset dizziness that began 5 days ago and has been present daily off and on ever since. Said that " the dizziness and imbalance happens every time I look up or down or turn my head to the left'. It last few seconds to a minute and dissipates when her head is in the straight position. She gets nauseated but denies associated HA, diaphoresis, palpitations, double vision, difficulty swallowing, tremors, memory changes, body rigidity, slurred speech, or language impairment.  Of note, she indicated that two days prior to the onset of dizziness she had an ear and sinus infection that required treatment with antibiotics.  Her symptoms prompted an ED visit where she was noted to have nystagmus and thus MRI brain was done which I reviewed myself and showed no abnormality that could explain her symptoms.  Denies having recent issues with low BP but said that had a new BP medication added recently. No head or neck trauma.    Past Medical History:   Diagnosis Date    Allergy     Amblyopia     Anemia     Anticoagulant long-term use     Arthritis 02/02/1992    Cataract     Depression     Dry eyes     Dry mouth     Duane's syndrome of right eye     Fibromyalgia 4/17/2014    Fibromyalgia     Fractured hip     RIGHT HIP    GERD (gastroesophageal reflux disease)     History of psychiatric hospitalization     Hyperlipidemia 02/02/1992    Hypertension     Kidney stone     Migraine headache     Osteoporosis     Psoriatic arthritis 02/02/1992    Right knee pain     post knee replacement surgery (possible rejectiion of metal)    RLS (restless legs syndrome)     Schizophrenia 02/02/1992    stable on meds    Urinary tract infection      Social History "     Socioeconomic History    Marital status:      Spouse name: Not on file    Number of children: 1    Years of education: Not on file    Highest education level: Not on file   Occupational History    Occupation: retired asst  [employer] [address] [employer].     Employer: 2010   Social Needs    Financial resource strain: Not hard at all    Food insecurity     Worry: Never true     Inability: Never true    Transportation needs     Medical: No     Non-medical: No   Tobacco Use    Smoking status: Former Smoker     Packs/day: 0.50     Years: 10.00     Pack years: 5.00     Types: Cigarettes     Quit date: 2012     Years since quittin.2    Smokeless tobacco: Former User    Tobacco comment: stopped smoking .     Substance and Sexual Activity    Alcohol use: No     Frequency: Never    Drug use: No    Sexual activity: Not Currently     Partners: Male     Birth control/protection: Post-menopausal   Lifestyle    Physical activity     Days per week: Not on file     Minutes per session: Not on file    Stress: Not on file   Relationships    Social connections     Talks on phone: More than three times a week     Gets together: Once a week     Attends Qu[employer] service: Never     Active member of club or organization: Yes     Attends meetings of clubs or organizations: Never     Relationship status:    Other Topics Concern    Patient feels they ought to cut down on drinking/drug use Not Asked    Patient annoyed by others criticizing their drinking/drug use Not Asked    Patient has felt bad or guilty about drinking/drug use Not Asked    Patient has had a drink/used drugs as an eye opener in the AM Not Asked    Are you pregnant or think you may be? Not Asked    Breast-feeding Not Asked   Social History Narrative    Exercise:  Childcare.        Ett:  3 days a week        Daughter and her 3 kids live with her.         Past Surgical History:   Procedure Laterality Date    brow ptosis  repair Right 2019    Surgeon: Dr. Karla Henson    CATARACT EXTRACTION       SECTION      COLONOSCOPY N/A 2016    Procedure: COLONOSCOPY;  Surgeon: ANRDIA Connelly MD;  [facility]  Service: Endoscopy;  Laterality: N/A;    cyst removed from right sinus  1982    HYSTERECTOMY      VAGINAL HYSTERECTOMY WITHOUT BSO - ENDOMETRIOSIS    INJECTION OF ANESTHETIC AGENT AROUND MEDIAL BRANCH NERVES INNERVATING LUMBAR FACET JOINT N/A 2019    Procedure: Lumbo-sacral Block, DR5 and Lateral Branches of S1,S2, S3;  Surgeon: Michelle Pineda Jr., MD;  Location: [facility];  Service: Pain Management;  Laterality: N/A;  Pt takes  and states she holds ASA on her own whenever she has procedures.  Instructed to hold x 3 days prior to procedure.      INJECTION OF ANESTHETIC AGENT INTO SACROILIAC JOINT Right 2018    Procedure: BLOCK, SACROILIAC JOINT-Right- ORAL SEDATION;  Surgeon: Michelle Pineda Jr., MD;  Location: [facility];  Service: Pain Management;  Laterality: Right;    INJECTION OF ANESTHETIC AGENT INTO SACROILIAC JOINT Bilateral 2018    Procedure: BLOCK, SACROILIAC JOINT-BILATERAL;  Surgeon: Michelle Pineda Jr., MD;  Location: [facility];  Service: Pain Management;  Laterality: Bilateral;  Please keep at 10:00 due to trasnsportation    INJECTION OF ANESTHETIC AGENT INTO SACROILIAC JOINT Bilateral 2019    Procedure: Bilateral Sacroiliac Joint Injection - Per Dr Pineda, not necessary to hold ASA.;  Surgeon: Michelle Pineda Jr., MD;  Location: [facility];  Service: Pain Management;  Laterality: Bilateral;    INTRAOCULAR PROSTHESES INSERTION Right 2019    Procedure: INSERTION, IOL PROSTHESIS;  Surgeon: Karen Song MD;  [address];  Service: Ophthalmology;  Laterality: Right;    INTRAOCULAR PROSTHESES INSERTION Left 2019    Procedure: INSERTION, IOL PROSTHESIS;  Surgeon: Karen Song MD;  [facility];  Service: Ophthalmology;  Laterality: Left;    JOINT REPLACEMENT Right     knee    KNEE SURGERY      PHACOEMULSIFICATION OF CATARACT Right 8/1/2019    Procedure: PHACOEMULSIFICATION, CATARACT;  Surgeon: Karen Song MD;  [facility];  Service: Ophthalmology;  Laterality: Right;    PHACOEMULSIFICATION OF CATARACT Left 9/26/2019    Procedure: PHACOEMULSIFICATION, CATARACT;  Surgeon: Karen Song MD;  Location: [facility] [address] [place] [address];  Service: Ophthalmology;  Laterality: Left;    RADIOFREQUENCY THERMOCOAGULATION Right 5/7/2019    Procedure: RADIOFREQUENCY THERMAL COAGULATION RIGHT DORSAL RAMUS 5 AND LATERAL BRANCH OF S1, S2 AND S3;  Surgeon: Michelle Pineda Jr., MD;  Location: [facility];  Service: Pain Management;  Laterality: Right;    RADIOFREQUENCY THERMOCOAGULATION Left 5/14/2019    Procedure: RADIOFREQUENCY THERMAL COAGULATION LEFT DORSAL RAMUS 5 AND LATERAL BRANCH OF S1,S2 AND S3;  Surgeon: Michelle Pineda Jr., MD;  Location: [facility];  Service: Pain Management;  Laterality: Left;    RADIOFREQUENCY THERMOCOAGULATION Right 10/22/2019    Procedure: RADIOFREQUENCY THERMAL COAGULATION---DARSAL RAMUS 5 and LATERAL S1,S2,and S3 Right;  Surgeon: Michelle Pineda Jr., MD;  Location: [facility];  Service: Pain Management;  Laterality: Right;  patient to sign consent DOS    RADIOFREQUENCY THERMOCOAGULATION Left 10/29/2019    Procedure: RADIOFREQUENCY THERMAL COAGULATION - LEFT - DR5, S1,S2, AND S3;  Surgeon: Michelle Pineda Jr., MD;  Location: [facility];  Service: Pain Management;  Laterality: Left;    RADIOFREQUENCY THERMOCOAGULATION Left 5/26/2020    Procedure: RADIOFREQUENCY THERMAL COAGULATION--Left DR5+ lateral branches of S1, S2, S3;  Surgeon: Michelle Pineda Jr., MD;  Location: [facility];  Service: Pain Management;  Laterality: Left;    RADIOFREQUENCY THERMOCOAGULATION Right 6/2/2020    Procedure: RADIOFREQUENCY THERMAL COAGULATION--Right DR5+  "lateral branches of S1, S2, S3;  Surgeon: Michelle Pineda Jr., MD;  Location: [facility];  Service: Pain Management;  Laterality: Right;    Surgery on right knee  07/09/1982    tumor removed from back left side upper shoulder  03/17/2006     Family History   Problem Relation Age of Onset    Colon cancer Mother     Psoriasis Mother     Cancer Mother         colon    Depression Mother     Diabetes Brother     Arthritis Brother     Heart disease Brother         cad    Cancer Father         lymphoma    Stroke Sister     No Known Problems Daughter     Hypertension Neg Hx     Suicide Neg Hx     Amblyopia Neg Hx     Blindness Neg Hx     Cataracts Neg Hx     Glaucoma Neg Hx     Macular degeneration Neg Hx     Retinal detachment Neg Hx     Strabismus Neg Hx            Review of Systems  General ROS: negative for chills, fever or weight loss  Psychological ROS: negative for hallucination, depression or suicidal ideation  Ophthalmic ROS: negative for blurry vision, photophobia or eye pain  ENT ROS: negative for epistaxis, sore throat or rhinorrhea  Respiratory ROS: no cough, shortness of breath, or wheezing  Cardiovascular ROS: no chest pain or dyspnea on exertion  Gastrointestinal ROS: no abdominal pain, change in bowel habits, or black/ bloody stools  Genito-Urinary ROS: no dysuria, trouble voiding, or hematuria  Musculoskeletal ROS: negative for gait disturbance or muscular weakness  Neurological ROS: no syncope or seizures; no ataxia  Dermatological ROS: negative for pruritis, rash and jaundice      Physical Exam:  BP (!) 127/51 (BP Location: Left arm, Patient Position: Sitting)   Pulse (!) 52   Ht 5' 2" (1.575 m)   BMI 17.92 kg/m²   General appearance: alert, cooperative, no distress  Constitutional:Oriented to person, place, and time.appears well-developed and well-nourished.   HEENT: Normocephalic, atraumatic, neck symmetrical, no nasal discharge   Eyes: conjunctivae/corneas clear, PERRL, " EOM's intact  Lungs: clear to auscultation bilaterally, no dullness to percussion bilaterally  Heart: regular rate and rhythm without rub; no displacement of the PMI   Abdomen: soft, non-tender; bowel sounds normoactive; no organomegaly  Extremities: extremities symmetric; no clubbing, cyanosis, or edema  Integument: Skin color, texture, turgor normal; no rashes; hair distrubution normal  Neurologic:   Mental status: alert and awake, oriented x 4, comprehension, naming, and repetition intact. No right to left confusion. Performs serial 7's without difficulty .No dysarthria.  CN 2-12: pupils 4 mm bilaterally, reactive to light. Fundi without papilledema. Visual fields full to confrontation. EOM: mild R eyelid ptosis and unable to abduct the R eye (old), mild unidirectional end of gaze nystagmus to the L but no skew deviation. Face sensation normal in all distributions. Face symmetric. Hearing grossly intact. Palate elevates well. Tongue midline without atrophy or fasciculations.  Motor: 5/5 all over  Sensory: intact in all modalities.  DTR's: 2+ all over.  Plantars: no tested.  Coordination: finger to nose and heel-knee-shin intact.  Gait: no ataxia or bradykinesia    LABS:    Complete Blood Count  Lab Results   Component Value Date    RBC 3.66 (L) 08/22/2020    HGB 12.1 08/22/2020    HCT 39 08/22/2020     (H) 08/22/2020    MCH 33.1 (H) 08/22/2020    MCHC 32.4 08/22/2020    RDW 12.7 08/22/2020     08/22/2020    MPV 8.5 (L) 08/22/2020    GRAN 5.7 08/22/2020    GRAN 61.7 08/22/2020    LYMPH 2.5 08/22/2020    LYMPH 27.5 08/22/2020    MONO 0.7 08/22/2020    MONO 7.3 08/22/2020    EOS 0.2 08/22/2020    BASO 0.07 08/22/2020    EOSINOPHIL 2.2 08/22/2020    BASOPHIL 0.8 08/22/2020    DIFFMETHOD Automated 08/22/2020       Comprehensive Metabolic Panel  Lab Results   Component Value Date    GLU 80 05/06/2020    BUN 6 (L) 05/06/2020    CREATININE 0.8 05/06/2020     (L) 05/06/2020    K 4.5 05/06/2020    CL  100 05/06/2020    PROT 6.4 05/06/2020    ALBUMIN 3.9 05/06/2020    BILITOT 0.5 05/06/2020    AST 36 05/06/2020    ALKPHOS 70 05/06/2020    CO2 26 05/06/2020    ALT 25 05/06/2020    ANIONGAP 9 05/06/2020    EGFRNONAA >60.0 05/06/2020    ESTGFRAFRICA >60.0 05/06/2020       TSH  Lab Results   Component Value Date    TSH 2.001 11/15/2019         Assessment: 66 y/o with HTN, HLD, migraine, schizophrenia, depression,  RLS, Duane's syndrome R eye, osteoporosis, kidney stones, and new onset dizziness triggered by head movements.  Reports recent URI.   Neuro exam with mild unilateral end of L gaze nystagmus without skew deviation. MRI brain is unrevealing.  Suspect BPPV.      ICD-10-CM ICD-9-CM    1. Vertigo  R42 780.4 Ambulatory referral/consult to Neurology     The encounter diagnosis was Vertigo.      Plan:  1) Dizziness/vertigo/imbalance: as above. PT with vestibular rehab/Epley's maneuver.  2) HTN  3) HLD  4) Migraine  5) Schizophrenia  6) Depression  7) RLS  8) Duane's syndrome R eye  9) Osteoporosis  10) Kidney stones  No orders of the defined types were placed in this encounter.          Rafat Jefferson MD

## 2020-08-26 NOTE — PROGRESS NOTES
Digital Medicine: Clinician Follow-Up    Called patient for routine follow up on blood pressure. Patient reports reducing her dose of metoprolol to 12.5mg daily as she is having dizzy spells - sometimes after standing. I encouraged her not to get too concerned about seeing lower SBP numbers especially because they are WNL. Patient denies using clonidine in recent weeks.      The history is provided by the patient.      Review of patient's allergies indicates:   -- Etanercept -- Other (See Comments)    --  Other reaction(s): recurrent infections   -- Chloramphenicol sod succinate -- Hives and Other (See Comments)   -- Codeine -- Other (See Comments)    --  Other reaction(s): Stomach upset   -- Nickel sutures (surgical stainless steel) -- Dermatitis    --  Allergic contact dermatitis   -- Adhesive -- Rash    --  Other reaction(s): Rash  Follow-up reason(s): routine follow up.     Hypertension    Readings are trending down   Patient is experiencing signs/symptoms of hypotension.  Patient is not experiencing signs/symptoms of hypertension.      Patient did make medication change.    Is patient tolerating med change? no  Reasons:  Patient had been confused about how to take her BP medications and after correction had began to experience dizziness more often.        Last 5 Patient Entered Readings                                      Current 30 Day Average: 133/69     Recent Readings 8/25/2020 8/25/2020 8/24/2020 8/24/2020 8/24/2020    SBP (mmHg) 124 128 93 93 100    DBP (mmHg) 81 69 51 50 47    Pulse 60 58 61 59 61                           Medication Adherence-Medication adherence was asssessed.    Patient reported missing medication: once a week and She changed her metoprolol dose herself due to dizziness.    Patient identified the following reasons for non-adherence:  Side effects from medication.            ASSESSMENT(S)  Patients BP average is 133/69 mmHg, which is above goal. Patient's BP goal is less than or  equal to 130/80 per 2017 ACC/AHA Hypertension Guidelines.       Hypertension Plan  Medication change. Metoprolol was reduced to 12.5mg daily  Continue current diet/physical activity routine.       Addressed any questions or concerns and patient has my contact information if needed prior to next outreach. Patient verbalizes understanding.      Explained to the patient that the Digital Medicine team is not available for emergencies. Advised patient call [facility] ([phone] or [phone]) or [phone] if needed.         There are no preventive care reminders to display for this patient.      Hypertension Medications             amLODIPine (NORVASC) 10 MG tablet Take 1 tablet (10 mg total) by mouth once daily.    cloNIDine (CATAPRES) 0.1 MG tablet TAKE 1 TABLET BY MOUTH TWICE DAILY AS NEEDED WHEN SYSTOLIC BLOOD PRESSURE IS ABOVE 160 OR DIASTOLIC ABOVE 100 MMHG    lisinopriL 10 MG tablet Take 1 tablet (10 mg total) by mouth once daily.

## 2020-08-31 ENCOUNTER — PATIENT OUTREACH (OUTPATIENT)
Dept: OTHER | Facility: OTHER | Age: 65
End: 2020-08-31

## 2020-08-31 NOTE — PROGRESS NOTES
"Digital Medicine: Clinician Follow-Up    Called patient for routine follow up on blood pressure and to respond to her call. Patient reports she has stopped taking metoprolol all together without notifying provider. She states it has been since "last Saturday" she stopped taking it. Patient states she has been experiencing dizziness which she attributes to hypotensive s/s. Patient was educated that metoprolol is not a medication that should be stopped suddenly and she may experience unpleasant effects from abrupt cessation.  Patient was recently seen by neurology for vertigo and she reports that she has a follow up appointment in two weeks if her dizziness continues. Patient was reassured that a SBP between 90 and 130 is WNL and was also encouraged to reach out to her PCP to rule out other causes of the dizziness. Patient reports she has not taken clonidine recently.    The history is provided by the patient.     Completed Medication Reconciliation    Follow-up reason(s): medication change follow-up and addressing patient questions/concerns.     Hypertension    Readings are trending down due to lifestyle change.       Patient is experiencing signs/symptoms of hypotension. Dizziness  Patient is not experiencing signs/symptoms of hypertension.      Patient did make medication change.    Is patient tolerating med change? yes      Additional Follow-up details: Patient was discussed during a Digital Medicine Roundtable that included 2 cardiologists. Due to her health history a higher BP goal due to dizziness was thought not necessary at this point, but she may possibly have other underlying issues that need to be assessed or ruled out.      Last 5 Patient Entered Readings                                      Current 30 Day Average: 131/68     Recent Readings 8/31/2020 8/30/2020 8/29/2020 8/29/2020 8/29/2020    SBP (mmHg) 127 121 97 122 114    DBP (mmHg) 59 57 62 74 61    Pulse 73 66 88 75 79         "                   Medication Adherence-Medication adherence was asssessed.    Patient reported missing medication: she stopped taking metoprolol all together.    Patient identified the following reasons for non-adherence:  Side effects from medication. She is concerned that it was the cause of her dizziness            ASSESSMENT(S)  Patients BP average is 131/68 mmHg, which is above goal. Patient's BP goal is less than or equal to 130/80 per 2017 ACC/AHA Hypertension Guidelines.       Hypertension Plan  Medication change. Patient has discontinued metoprolol; clonidine was discontinued as patient's BP has stabilized and she has not used clonidine in weeks  Continue current diet/physical activity routine.  Await MD intervention. Chart routed to PCP per patient request  Provided patient education. Discussed normal BP and the importance of discussing with medical provider before abruptly discontinuing medications        Addressed any questions or concerns and patient has my contact information if needed prior to next outreach. Patient verbalizes understanding.      Explained to the patient that the Digital Medicine team is not available for emergencies. Advised patient call [facility] On [facility] ([phone] or [phone]) or [phone] if needed.         There are no preventive care reminders to display for this patient.      Hypertension Medications             amLODIPine (NORVASC) 10 MG tablet Take 1 tablet (10 mg total) by mouth once daily.    lisinopriL 10 MG tablet Take 1 tablet (10 mg total) by mouth once daily.

## 2020-09-03 DIAGNOSIS — L40.50 PSA (PSORIATIC ARTHRITIS): ICD-10-CM

## 2020-09-03 RX ORDER — SECUKINUMAB 150 MG/ML
300 INJECTION SUBCUTANEOUS
Qty: 6 SYRINGE | Refills: 0 | Status: SHIPPED | OUTPATIENT
Start: 2020-09-03 | End: 2020-09-08 | Stop reason: SDUPTHER

## 2020-09-08 DIAGNOSIS — L40.50 PSA (PSORIATIC ARTHRITIS): ICD-10-CM

## 2020-09-08 RX ORDER — SECUKINUMAB 150 MG/ML
300 INJECTION SUBCUTANEOUS
Qty: 6 SYRINGE | Refills: 0 | Status: SHIPPED | OUTPATIENT
Start: 2020-09-08 | End: 2020-10-12

## 2020-09-10 ENCOUNTER — TELEPHONE (OUTPATIENT)
Dept: RHEUMATOLOGY | Facility: CLINIC | Age: 65
End: 2020-09-10

## 2020-09-10 ENCOUNTER — HOSPITAL ENCOUNTER (OUTPATIENT)
Dept: RADIOLOGY | Facility: HOSPITAL | Age: 65
Discharge: HOME | End: 2020-09-10
Attending: INTERNAL MEDICINE
Payer: MEDICARE

## 2020-09-10 DIAGNOSIS — M81.0 OSTEOPOROSIS, UNSPECIFIED OSTEOPOROSIS TYPE, UNSPECIFIED PATHOLOGICAL FRACTURE PRESENCE: ICD-10-CM

## 2020-09-10 PROCEDURE — 77080 DXA BONE DENSITY AXIAL: CPT | Mod: 26,,, | Performed by: RADIOLOGY

## 2020-09-10 PROCEDURE — 77080 DEXA BONE DENSITY SPINE HIP: ICD-10-PCS | Mod: 26,,, | Performed by: RADIOLOGY

## 2020-09-10 PROCEDURE — 77080 DXA BONE DENSITY AXIAL: CPT | Mod: TC

## 2020-09-14 ENCOUNTER — PATIENT OUTREACH (OUTPATIENT)
Dept: OTHER | Facility: OTHER | Age: 65
End: 2020-09-14

## 2020-09-14 NOTE — PROGRESS NOTES
Digital Medicine: Clinician Follow-Up    Called patient for routine follow up on blood pressure. Patient reports adherence to medication regimen daily and denies missed doses. Patient states she still experiences ocassional dizziness and has an appointment scheduled with PCP next month.    The history is provided by the patient.   Follow-up reason(s): medication change follow-up.     Hypertension    Readings are trending down due to lifestyle change and improved technique.     Patient is not experiencing signs/symptoms of hypertension.      Patient did make medication change.    Is patient tolerating med change? yes          Last 5 Patient Entered Readings                                      Current 30 Day Average: 124/64     Recent Readings 9/13/2020 9/12/2020 9/10/2020 9/9/2020 9/9/2020    SBP (mmHg) 119 124 123 116 79    DBP (mmHg) 65 70 62 63 41    Pulse 88 82 80 62 67             Screenings    ASSESSMENT(S)  Patients BP average is 124/64 mmHg, which is at goal. Patient's BP goal is less than or equal to 130/80 per 2017 ACC/AHA Hypertension Guidelines.      Hypertension Plan  Continue current therapy.  Continue current diet/physical activity routine.       Addressed any questions or concerns and patient has my contact information if needed prior to next outreach. Patient verbalizes understanding.      Explained to the patient that the Digital Medicine team is not available for emergencies. Advised patient call [facility] ([phone] or [phone]) or [phone] if needed.         There are no preventive care reminders to display for this patient.      Hypertension Medications             amLODIPine (NORVASC) 10 MG tablet Take 1 tablet (10 mg total) by mouth once daily.    lisinopriL 10 MG tablet Take 1 tablet (10 mg total) by mouth once daily.

## 2020-09-15 ENCOUNTER — TELEPHONE (OUTPATIENT)
Dept: INFECTIOUS DISEASES | Facility: HOSPITAL | Age: 65
End: 2020-09-15

## 2020-09-15 ENCOUNTER — OFFICE VISIT (OUTPATIENT)
Dept: DERMATOLOGY | Facility: CLINIC | Age: 65
End: 2020-09-15
Payer: MEDICARE

## 2020-09-15 ENCOUNTER — CLINICAL SUPPORT (OUTPATIENT)
Dept: REHABILITATION | Facility: HOSPITAL | Age: 65
End: 2020-09-15
Attending: PHYSICIAN ASSISTANT
Payer: MEDICARE

## 2020-09-15 VITALS — BODY MASS INDEX: 17.92 KG/M2 | WEIGHT: 98 LBS

## 2020-09-15 DIAGNOSIS — R29.898 DECREASED ROM OF NECK: ICD-10-CM

## 2020-09-15 DIAGNOSIS — L82.1 SEBORRHEIC KERATOSES: Primary | ICD-10-CM

## 2020-09-15 DIAGNOSIS — Z87.2 HISTORY OF ACTINIC KERATOSES: ICD-10-CM

## 2020-09-15 DIAGNOSIS — H83.2X2 VESTIBULAR DISEQUILIBRIUM, LEFT: ICD-10-CM

## 2020-09-15 DIAGNOSIS — L81.4 LENTIGINES: ICD-10-CM

## 2020-09-15 PROBLEM — R53.1 DECREASED STRENGTH: Status: RESOLVED | Noted: 2018-08-17 | Resolved: 2020-09-15

## 2020-09-15 PROBLEM — M53.86 DECREASED ROM OF LUMBAR SPINE: Status: RESOLVED | Noted: 2018-08-17 | Resolved: 2020-09-15

## 2020-09-15 PROBLEM — M54.50 ACUTE RIGHT-SIDED LOW BACK PAIN WITHOUT SCIATICA: Status: RESOLVED | Noted: 2018-08-17 | Resolved: 2020-09-15

## 2020-09-15 PROCEDURE — 3288F PR FALLS RISK ASSESSMENT DOCUMENTED: ICD-10-PCS | Mod: CPTII,S$GLB,, | Performed by: DERMATOLOGY

## 2020-09-15 PROCEDURE — 99213 PR OFFICE/OUTPT VISIT, EST, LEVL III, 20-29 MIN: ICD-10-PCS | Mod: S$GLB,,, | Performed by: DERMATOLOGY

## 2020-09-15 PROCEDURE — 97162 PT EVAL MOD COMPLEX 30 MIN: CPT | Mod: PN

## 2020-09-15 PROCEDURE — 1126F AMNT PAIN NOTED NONE PRSNT: CPT | Mod: S$GLB,,, | Performed by: DERMATOLOGY

## 2020-09-15 PROCEDURE — 97112 NEUROMUSCULAR REEDUCATION: CPT | Mod: PN

## 2020-09-15 PROCEDURE — 99213 OFFICE O/P EST LOW 20 MIN: CPT | Mod: S$GLB,,, | Performed by: DERMATOLOGY

## 2020-09-15 PROCEDURE — 1100F PR PT FALLS ASSESS DOC 2+ FALLS/FALL W/INJURY/YR: ICD-10-PCS | Mod: CPTII,S$GLB,, | Performed by: DERMATOLOGY

## 2020-09-15 PROCEDURE — 3008F PR BODY MASS INDEX (BMI) DOCUMENTED: ICD-10-PCS | Mod: CPTII,S$GLB,, | Performed by: DERMATOLOGY

## 2020-09-15 PROCEDURE — 1100F PTFALLS ASSESS-DOCD GE2>/YR: CPT | Mod: CPTII,S$GLB,, | Performed by: DERMATOLOGY

## 2020-09-15 PROCEDURE — 1159F MED LIST DOCD IN RCRD: CPT | Mod: S$GLB,,, | Performed by: DERMATOLOGY

## 2020-09-15 PROCEDURE — 3008F BODY MASS INDEX DOCD: CPT | Mod: CPTII,S$GLB,, | Performed by: DERMATOLOGY

## 2020-09-15 PROCEDURE — 3288F FALL RISK ASSESSMENT DOCD: CPT | Mod: CPTII,S$GLB,, | Performed by: DERMATOLOGY

## 2020-09-15 PROCEDURE — 1159F PR MEDICATION LIST DOCUMENTED IN MEDICAL RECORD: ICD-10-PCS | Mod: S$GLB,,, | Performed by: DERMATOLOGY

## 2020-09-15 PROCEDURE — 99999 PR PBB SHADOW E&M-EST. PATIENT-LVL II: ICD-10-PCS | Mod: PBBFAC,,, | Performed by: DERMATOLOGY

## 2020-09-15 PROCEDURE — 1126F PR PAIN SEVERITY QUANTIFIED, NO PAIN PRESENT: ICD-10-PCS | Mod: S$GLB,,, | Performed by: DERMATOLOGY

## 2020-09-15 PROCEDURE — 95992 CANALITH REPOSITIONING PROC: CPT | Mod: PN

## 2020-09-15 PROCEDURE — 99999 PR PBB SHADOW E&M-EST. PATIENT-LVL II: CPT | Mod: PBBFAC,,, | Performed by: DERMATOLOGY

## 2020-09-15 NOTE — PROGRESS NOTES
"Chronic Pain Established Patient Note (Follow up visit)        SUBJECTIVE:    Interval update:  9/16/2020- Mrs. Landry presents to clinic today reporting left hip pain for the past 3-4 mos, which has not improved with PT.  Pain starts in the lateral left hip "on the bone" and radiates through the buttock, down the lateral thigh not passing the knee, and into the anterolateral thigh.  She endorses regular stretching and states that this pain is distinctly different from the SI pain for which she received SI RFA in the past.    6/09/2020-   64-year-old female presents status post left then right  DR 5+ lateral branches of the S1, S2 and S3 RFA with reported 100% continued  relief she reports that her pain score today is 0/10.  She reports improvement with her ADLs and overall improvement of her functionality.    05/05/2020  presents tele-medicine appointment for a follow-up appointment for low back, left groin and bilateral hip pain.  Since the last visit, Bhavna Landry states the pain has been persistant. Current pain intensity is 9/10.  Patient reports onset of pain 2 weeks, patient states bending forward and sitting for long periods of time increase her pain.  Pain is described as aching.  She reports no radicular symptoms in either leg.  Worst pain is not out of 10.  Patient is status post right and left L5 Dorsal Ramus and Lateral Branches of S1, S2, and S3 (4 levels) RFA reported 80-90% relief for a minimal 6 months.  Pain is now returned and patient like to reschedule procedure.        Pain Medications:  - Opioids:  Norco 5-325 per her PCP  - NSAIDs:  Diclofenac gel  - Anti-Depressants: venlafaxine (EFFEXOR-XR) 150 MG   - Anti-Convulsants:  Gabapentin 400 mg nightly  - Others:  Not applicable      Physical Therapy/Home Exercise: yes, but has not attended in the last 6 months.     report:  Reviewed and consistent with medication use as prescribed.    Pain Procedures:  Bilateral SI joint injections, bilateral "  Dorsal ramus block(DR5, S1,S2,S3),  bilateral L5 Dorsal Ramus and Lateral Branches of S1, S2, and S3 (4 levels) RFA      Imaging:     DXA Bone Density Spine And Hip  Narrative: EXAMINATION:  DEXA BONE DENSITY SPINE HIP    CLINICAL HISTORY:  suspected osteoporosis; Age-related osteoporosis without current pathological fracture    TECHNIQUE:  DXA scanning was performed over the hips and lumbar spine.  Review of the images confirms satisfactory positioning and technique.    COMPARISON:  08/21/2018    FINDINGS:  The L1 to L4 vertebral bone mineral density is equal to 1.202 g/cm squared with a T score of 0.1.  Previously 1.106    The left femoral neck bone mineral density is equal to 0.738 g/cm squared with a T score of -2.2.  Previously, -2.0    The right femoral neck bone mineral density is equal to 0.808 g/cm squared with a T score of -1.7.  Previously, -1.7    There is a 20.6% risk of a major osteoporotic fracture and a 4.6% risk of hip fracture in the next 10 years (FRAX).  Impression: Normal bone mineral density of the lumbar spine for age.  Note that degenerative change, particularly in the lumbar spine may falsely elevate calculated bone mineral density and should be considered as part of the final clinical recommendation.    Osteopenia of bilateral femoral necks.    Electronically signed by: Henok Damon  Date:    09/10/2020  Time:    14:41         Past Medical History:   Diagnosis Date    Allergy     Amblyopia     Anemia     Anticoagulant long-term use     Arthritis 02/02/1992    Cataract     Depression     Dry eyes     Dry mouth     Duane's syndrome of right eye     Fibromyalgia 4/17/2014    Fibromyalgia     Fractured hip     RIGHT HIP    GERD (gastroesophageal reflux disease)     History of psychiatric hospitalization     Hyperlipidemia 02/02/1992    Hypertension     Kidney stone     Migraine headache     Osteoporosis     Psoriatic arthritis 02/02/1992    Right knee pain     post  knee replacement surgery (possible rejectiion of metal)    RLS (restless legs syndrome)     Schizophrenia 1992    stable on meds    Urinary tract infection      Past Surgical History:   Procedure Laterality Date    brow ptosis repair Right 2019    Surgeon: Dr. Karla Henson    CATARACT EXTRACTION       SECTION      COLONOSCOPY N/A 2016    Procedure: COLONOSCOPY;  Surgeon: ANDRIA Connelly MD;  Location: [address]);  Service: Endoscopy;  Laterality: N/A;    cyst removed from right sinus  1982    HYSTERECTOMY      VAGINAL HYSTERECTOMY WITHOUT BSO - ENDOMETRIOSIS    INJECTION OF ANESTHETIC AGENT AROUND MEDIAL BRANCH NERVES INNERVATING LUMBAR FACET JOINT N/A 2019    Procedure: Lumbo-sacral Block, DR5 and Lateral Branches of S1,S2, S3;  Surgeon: Michelle Pineda Jr., MD;  Location: [facility];  Service: Pain Management;  Laterality: N/A;  Pt takes  and states she holds ASA on her own whenever she has procedures.  Instructed to hold x 3 days prior to procedure.      INJECTION OF ANESTHETIC AGENT INTO SACROILIAC JOINT Right 2018    Procedure: BLOCK, SACROILIAC JOINT-Right- ORAL SEDATION;  Surgeon: Michelle Pineda Jr., MD;  Location: [facility];  Service: Pain Management;  Laterality: Right;    INJECTION OF ANESTHETIC AGENT INTO SACROILIAC JOINT Bilateral 2018    Procedure: BLOCK, SACROILIAC JOINT-BILATERAL;  Surgeon: Michelle Pineda Jr., MD;  Location: [facility];  Service: Pain Management;  Laterality: Bilateral;  Please keep at 10:00 due to trasnsportation    INJECTION OF ANESTHETIC AGENT INTO SACROILIAC JOINT Bilateral 2019    Procedure: Bilateral Sacroiliac Joint Injection - Per Dr Pineda, not necessary to hold ASA.;  Surgeon: Michelle Pineda Jr., MD;  Location: [facility];  Service: Pain Management;  Laterality: Bilateral;    INTRAOCULAR PROSTHESES INSERTION Right 2019    Procedure: INSERTION, IOL PROSTHESIS;   Surgeon: Karen Song MD;  Location: [facility] OR [address] [place] [address];  Service: Ophthalmology;  Laterality: Right;    INTRAOCULAR PROSTHESES INSERTION Left 9/26/2019    Procedure: INSERTION, IOL PROSTHESIS;  Surgeon: Karen Song MD;  Location: [facility] OR [address] [place] [address];  Service: Ophthalmology;  Laterality: Left;    JOINT REPLACEMENT Right     knee    KNEE SURGERY      PHACOEMULSIFICATION OF CATARACT Right 8/1/2019    Procedure: PHACOEMULSIFICATION, CATARACT;  Surgeon: Karen Song MD;  Location: [facility] OR [address] [place] [address];  Service: Ophthalmology;  Laterality: Right;    PHACOEMULSIFICATION OF CATARACT Left 9/26/2019    Procedure: PHACOEMULSIFICATION, CATARACT;  Surgeon: Karen Song MD;  Location: [facility] OR [address] [place] [address];  Service: Ophthalmology;  Laterality: Left;    RADIOFREQUENCY THERMOCOAGULATION Right 5/7/2019    Procedure: RADIOFREQUENCY THERMAL COAGULATION RIGHT DORSAL RAMUS 5 AND LATERAL BRANCH OF S1, S2 AND S3;  Surgeon: Michelle Pineda Jr., MD;  Location: [facility];  Service: Pain Management;  Laterality: Right;    RADIOFREQUENCY THERMOCOAGULATION Left 5/14/2019    Procedure: RADIOFREQUENCY THERMAL COAGULATION LEFT DORSAL RAMUS 5 AND LATERAL BRANCH OF S1,S2 AND S3;  Surgeon: Michelle Pineda Jr., MD;  Location: [facility];  Service: Pain Management;  Laterality: Left;    RADIOFREQUENCY THERMOCOAGULATION Right 10/22/2019    Procedure: RADIOFREQUENCY THERMAL COAGULATION---DARSAL RAMUS 5 and LATERAL S1,S2,and S3 Right;  Surgeon: Michelle Pineda Jr., MD;  Location: [facility];  Service: Pain Management;  Laterality: Right;  patient to sign consent DOS    RADIOFREQUENCY THERMOCOAGULATION Left 10/29/2019    Procedure: RADIOFREQUENCY THERMAL COAGULATION - LEFT - DR5, S1,S2, AND S3;  Surgeon: Michelle Pineda Jr., MD;  Location: [facility];  Service: Pain Management;  Laterality: Left;    RADIOFREQUENCY THERMOCOAGULATION Left 5/26/2020    Procedure: RADIOFREQUENCY THERMAL COAGULATION--Left DR5+ lateral  branches of S1, S2, S3;  Surgeon: Michelle Pineda Jr., MD;  Location: [facility];  Service: Pain Management;  Laterality: Left;    RADIOFREQUENCY THERMOCOAGULATION Right 2020    Procedure: RADIOFREQUENCY THERMAL COAGULATION--Right DR5+ lateral branches of S1, S2, S3;  Surgeon: Michelle Pineda Jr., MD;  Location: [facility];  Service: Pain Management;  Laterality: Right;    Surgery on right knee  1982    tumor removed from back left side upper shoulder  2006     Social History     Socioeconomic History    Marital status:      Spouse name: Not on file    Number of children: 1    Years of education: Not on file    Highest education level: Not on file   Occupational History    Occupation: retired asst  [employer].     Employer: 2010   Social Needs    Financial resource strain: Not hard at all    Food insecurity     Worry: Never true     Inability: Never true    Transportation needs     Medical: No     Non-medical: No   Tobacco Use    Smoking status: Former Smoker     Packs/day: 0.50     Years: 10.00     Pack years: 5.00     Types: Cigarettes     Quit date: 2012     Years since quittin.3    Smokeless tobacco: Former User    Tobacco comment: stopped smoking 2017.     Substance and Sexual Activity    Alcohol use: No     Frequency: Never    Drug use: No    Sexual activity: Not Currently     Partners: Male     Birth control/protection: Post-menopausal   Lifestyle    Physical activity     Days per week: Not on file     Minutes per session: Not on file    Stress: Not on file   Relationships    Social connections     Talks on phone: More than three times a week     Gets together: Once a week     Attends Advent service: Never     Active member of club or organization: Yes     Attends meetings of clubs or organizations: Never     Relationship status:    Other Topics Concern    Patient feels they ought to cut down on drinking/drug use Not  Asked    Patient annoyed by others criticizing their drinking/drug use Not Asked    Patient has felt bad or guilty about drinking/drug use Not Asked    Patient has had a drink/used drugs as an eye opener in the AM Not Asked    Are you pregnant or think you may be? Not Asked    Breast-feeding Not Asked   Social History Narrative    Exercise:  Childcare.        Ett:  3 days a week        Daughter and her 3 kids live with her.         Family History   Problem Relation Age of Onset    Colon cancer Mother     Psoriasis Mother     Cancer Mother         colon    Depression Mother     Diabetes Brother     Arthritis Brother     Heart disease Brother         cad    Cancer Father         lymphoma    Stroke Sister     No Known Problems Daughter     Hypertension Neg Hx     Suicide Neg Hx     Amblyopia Neg Hx     Blindness Neg Hx     Cataracts Neg Hx     Glaucoma Neg Hx     Macular degeneration Neg Hx     Retinal detachment Neg Hx     Strabismus Neg Hx        Review of patient's allergies indicates:   Allergen Reactions    Etanercept Other (See Comments)     Other reaction(s): recurrent infections    Chloramphenicol sod succinate Hives and Other (See Comments)    Codeine Other (See Comments)     Other reaction(s): Stomach upset    Nickel sutures [surgical stainless steel] Dermatitis     Allergic contact dermatitis    Adhesive Rash     Other reaction(s): Rash       Current Outpatient Medications   Medication Sig    albuterol (ACCUNEB) 1.25 mg/3 mL Nebu TAKE 3 ML BY NEBULIZATION EVERY 6 HOURS AS NEEDED    albuterol (PROAIR HFA) 90 mcg/actuation inhaler 2 puffs qid prn    amLODIPine (NORVASC) 10 MG tablet Take 1 tablet (10 mg total) by mouth once daily.    ascorbic acid, vitamin C, (VITAMIN C) 500 MG tablet Take 1,500 mg by mouth once daily.    aspirin 325 MG tablet Take 325 mg by mouth once daily.    atorvastatin (LIPITOR) 80 MG tablet TAKE 1 TABLET(80 MG) BY MOUTH EVERY DAY    b complex vitamins  tablet Take 1 tablet by mouth once daily.    benztropine (COGENTIN) 0.5 MG tablet Take 1 tablet (0.5 mg total) by mouth 2 (two) times daily.    capsaicin 0.025 % PtMd Apply 1 patch topically 2 (two) times daily as needed.    cyanocobalamin 500 MCG tablet Take 500 mcg by mouth once daily.    diclofenac sodium (VOLTAREN) 1 % Gel Apply 2 g topically 4 (four) times daily as needed.    fluticasone propionate (FLONASE) 50 mcg/actuation nasal spray 1 spray (50 mcg total) by Each Nostril route once daily.    gabapentin (NEURONTIN) 100 MG capsule Take 1 tab by mouth in the morning and middle of the day.    gabapentin (NEURONTIN) 400 MG capsule Take 1 capsule (400 mg total) by mouth every evening.    HYDROcodone-acetaminophen (NORCO) 5-325 mg per tablet Take 1 tablet by mouth every 8 (eight) hours as needed for Pain. (Patient not taking: Reported on 9/15/2020)    lisinopriL 10 MG tablet Take 1 tablet (10 mg total) by mouth once daily.    meclizine (ANTIVERT) 12.5 mg tablet Take 1 tablet (12.5 mg total) by mouth 2 (two) times daily as needed.    multivitamin (THERAGRAN) per tablet Take 1 tablet by mouth once daily.    risperiDONE (RISPERDAL) 2 MG tablet Take 1 tablet (2 mg total) by mouth every evening.    risperiDONE (RISPERDAL) 4 MG tablet Take 1 tablet (4 mg total) by mouth every morning.    secukinumab (COSENTYX PEN, 2 PENS,) 150 mg/mL PnIj Inject 300 mg into the skin every 30 days.    sulfaSALAzine (AZULFIDINE) 500 MG EC tablet TAKE 3 TABLETS BY MOUTH  TWICE DAILY    teriparatide (FORTEO) 20 mcg/dose - 600 mcg/2.4 mL PnIj Inject 0.08 mLs (20 mcg total) into the skin once daily.    traMADoL (ULTRAM) 50 mg tablet 1-2 tabs po q 12 h prn pain    traZODone (DESYREL) 50 MG tablet Take 1 tablet (50 mg total) by mouth nightly as needed for Insomnia.    venlafaxine (EFFEXOR-XR) 150 MG Cp24 Take 1 capsule (150 mg total) by mouth once daily.    vitamin D (VITAMIN D3) 1000 units Tab Take 1,000 Units by mouth once  daily.    vitamin E 200 UNIT capsule Take 200 Units by mouth once daily.     No current facility-administered medications for this visit.      Facility-Administered Medications Ordered in Other Visits   Medication    0.9%  NaCl infusion    0.9%  NaCl infusion    lidocaine (PF) 10 mg/ml (1%) injection 10 mg    phenylephrine HCL 2.5% ophthalmic solution 1 drop    proparacaine 0.5 % ophthalmic solution 1 drop    tropicamide 1% ophthalmic solution 1 drop       REVIEW OF SYSTEMS:    GENERAL:  No weight loss, malaise or fevers.  HEENT:   No recent changes in vision or hearing  NECK:  Negative for lumps, no difficulty with swallowing.  RESPIRATORY:  Negative for cough, wheezing or shortness of breath, patient denies any recent URI.  CARDIOVASCULAR:  Negative for chest pain, leg swelling or palpitations.  GI:  Negative for abdominal discomfort, blood in stools or black stools or change in bowel habits.  MUSCULOSKELETAL:  See HPI.  SKIN:  Negative for lesions, rash, and itching.  PSYCH:  No mood disorder or recent psychosocial stressors.  Patients sleep is not disturbed secondary to pain.  HEMATOLOGY/LYMPHOLOGY:  Negative for prolonged bleeding, bruising easily or swollen nodes.  Patient is not currently taking any anti-coagulants  NEURO:   No history of headaches, syncope, paralysis, seizures or tremors.  All other reviewed and negative other than HPI.    OBJECTIVE:    Physical Exam  Lumbar Spine & Hip Exam:          Inspection: atraumatic, normal lordosis/scoliosis          Palpation: Mild TTP over left SI joint.  Exquisite TTP at LEFT GT bursa.  TTP tracks posteriorly along path of piriformis muscle and lateral along IT band.          ROM: limited extension and flexion due to pain          Facet loading: positive bilaterally          SLR: negative bilaterally          Kehinde's: L neg / R neg        ASSESSMENT:  64-year-old female with chronic SI pain and newer onset left GT bursitis.  Patient advised to stretch  and strengthen muscles of th legs.  I will provide a right GT bursa injection in clinic today (see note below).      Diagnosis:    1. Greater trochanteric bursitis of left hip     2. Chronic left hip pain           PLAN:   - Right GT bursa injection in clinic today  - I have stressed the importance of physical activity and a home exercise plan to help with pain and improve health.  - Patient can continue with medications for now since they are providing benefits, using them appropriately, and without side effects.  - Counseled patient regarding the importance of activity modification, constant sleeping habits and physical therapy.  -Consider a SI joint fusion in the future if pain returns.   The above plan and management options were discussed at length with patient. Patient is in agreement with the above and verbalized understanding. Dr. Pineda was consulted on this patient  and agrees with this plan.      Michelle Pineda Jr, MD  Interventional Pain Medicine / Anesthesiology        09/15/2020     Disclaimer: This note was partly generated using dictation software which may occasionally result in transcription errors.      Greater Trochanter Bursa Injection     Pre-operative diagnosis: LEFT GT bursitis  Post-operative diagnosis: LEFT GT bursitis  Procedure Date: 09/16/2020  Procedure:  (1) Left Greater Trochanter Bursa Injection    Description of procedure in detail: Informed consent obtained after explaining the procedure and potential complications including local discomfort, infection, headache, temporary or permanent weakness and/or numbness of one or both legs, temporary or permanent paraplegia, heart attack and stroke. Patient was brought back to procedure room and placed in a prone position and head resting comfortably in head ring. Prior to the initiation of the procedure, the patient's identity, the site, and the nature of the procedure were verified.     The skin was prepped with chloroprep . The  "Left greater trochanter bursa was identified by palpation.  A 25G 1.25" needle was advanced through the skin tot he target location.  Next, 2.5 mL containing 20 mg Depomedrol and 2 mL of 0.25% Bupivacaine was injected without difficulty or resistance. Needle was removed with flush and bandaged placed over site of needle insertion. The procedure was well tolerated.    Estimated blood loss: None       Michelle Pineda Jr, MD  Interventional Pain Medicine / Anesthesiology          "

## 2020-09-15 NOTE — TELEPHONE ENCOUNTER
Detail Level: Zone Explained to patient that Marcela will be reaching out to further help with any questions patient may have in regards to payment for Recalst infusion   Detail Level: Generalized

## 2020-09-15 NOTE — PROGRESS NOTES
Subjective:       Patient ID:  Bhavna Landry is a 65 y.o. female who presents for   Chief Complaint   Patient presents with    Spot     left arm    Skin Check     UBSE     Spot on left arm not painful no tx.     Spot - Initial  Affected locations: face, right arm, chest, left arm, torso, back and abdomen  Signs / symptoms: asymptomatic  Aggravated by: nothing  Relieving factors/Treatments tried: nothing        Review of Systems   Constitutional: Negative for fever, chills, weight loss, weight gain, fatigue, night sweats and malaise.   Skin: Positive for daily sunscreen use, activity-related sunscreen use and wears hat.   Hematologic/Lymphatic: Bruises/bleeds easily.        Objective:    Physical Exam   Constitutional: She appears well-developed and well-nourished. No distress.   Neurological: She is alert and oriented to person, place, and time. She is not disoriented.   Psychiatric: She has a normal mood and affect.   Skin:   Areas Examined (abnormalities noted in diagram):   Head / Face Inspection Performed  Neck Inspection Performed  Chest / Axilla Inspection Performed  Back Inspection Performed  RUE Inspected  LUE Inspection Performed                   Diagram Legend     Erythematous scaling macule/papule c/w actinic keratosis       Vascular papule c/w angioma      Pigmented verrucoid papule/plaque c/w seborrheic keratosis      Yellow umbilicated papule c/w sebaceous hyperplasia      Irregularly shaped tan macule c/w lentigo     1-2 mm smooth white papules consistent with Milia      Movable subcutaneous cyst with punctum c/w epidermal inclusion cyst      Subcutaneous movable cyst c/w pilar cyst      Firm pink to brown papule c/w dermatofibroma      Pedunculated fleshy papule(s) c/w skin tag(s)      Evenly pigmented macule c/w junctional nevus     Mildly variegated pigmented, slightly irregular-bordered macule c/w mildly atypical nevus      Flesh colored to evenly pigmented papule c/w intradermal nevus        "Pink pearly papule/plaque c/w basal cell carcinoma      Erythematous hyperkeratotic cursted plaque c/w SCC      Surgical scar with no sign of skin cancer recurrence      Open and closed comedones      Inflammatory papules and pustules      Verrucoid papule consistent consistent with wart     Erythematous eczematous patches and plaques     Dystrophic onycholytic nail with subungual debris c/w onychomycosis     Umbilicated papule    Erythematous-base heme-crusted tan verrucoid plaque consistent with inflamed seborrheic keratosis     Erythematous Silvery Scaling Plaque c/w Psoriasis     See annotation      Assessment / Plan:        Seborrheic keratoses  reassurance      Lentigines  The "ABCD" rules to observe pigmented lesions were reviewed.      History of actinic keratoses  Brochure provided    Skin cancer aad             Follow up in about 1 year (around 9/15/2021).  "

## 2020-09-15 NOTE — PLAN OF CARE
[facility]  Physical Therapy Neurological Rehabilitation Initial Evaluation    Name: Bhavna Landry  Clinic Number: [id number]    Therapy Diagnosis:   Encounter Diagnoses   Name Primary?    Vestibular disequilibrium, left     Decreased ROM of neck      Physician: Karlee Tran PA-C    Physician Orders: PT Eval and Treat NOTE: Dizziness/vertigo/imbalance: as above. PT with vestibular rehab/Epley's maneuver.  Medical Diagnosis from Referral: R42 (ICD-10-CM) - Vertigo  Evaluation Date: 9/15/2020  Authorization Period Expiration: 0822/2021  Plan of Care Expiration:  11/13/2020  Visit # / Visits authorized: 1/ 1    Time In: 1005  Time Out: 1110  Total Billable Time: 65 minutes (1 high eval, 1 NMR, 1Canalith repositioning)    Precautions: Falls and dizziness    Subjective   Date of onset: 2 months ago  History of current condition - Jessica reports: dizziness with head turns and sitting up and lying back.  She states that she is dizzy with bed mobility, walking and anytime she performed movements that are triggers (see below for triggers)     Medical History:   Past Medical History:   Diagnosis Date    Allergy     Amblyopia     Anemia     Anticoagulant long-term use     Arthritis 02/02/1992    Cataract     Depression     Dry eyes     Dry mouth     Duane's syndrome of right eye     Fibromyalgia 4/17/2014    Fibromyalgia     Fractured hip     RIGHT HIP    GERD (gastroesophageal reflux disease)     History of psychiatric hospitalization     Hyperlipidemia 02/02/1992    Hypertension     Kidney stone     Migraine headache     Osteoporosis     Psoriatic arthritis 02/02/1992    Right knee pain     post knee replacement surgery (possible rejectiion of metal)    RLS (restless legs syndrome)     Schizophrenia 02/02/1992    stable on meds    Urinary tract infection      Surgical History:   Bhavna Landry  has a past surgical history that includes Surgery on right knee  (1982); cyst removed from right sinus (1982); tumor removed from back left side upper shoulder (2006); Hysterectomy; Knee surgery;  section; Joint replacement (Right); Colonoscopy (N/A, 2016); Injection of anesthetic agent into sacroiliac joint (Right, 2018); Injection of anesthetic agent into sacroiliac joint (Bilateral, 2018); Injection of anesthetic agent into sacroiliac joint (Bilateral, 2019); brow ptosis repair (Right, 2019); Injection of anesthetic agent around medial branch nerves innervating lumbar facet joint (N/A, 2019); Radiofrequency thermocoagulation (Right, 2019); Radiofrequency thermocoagulation (Left, 2019); Phacoemulsification of cataract (Right, 2019); Intraocular prosthesis insertion (Right, 2019); Cataract extraction; Phacoemulsification of cataract (Left, 2019); Intraocular prosthesis insertion (Left, 2019); Radiofrequency thermocoagulation (Right, 10/22/2019); Radiofrequency thermocoagulation (Left, 10/29/2019); Radiofrequency thermocoagulation (Left, 2020); and Radiofrequency thermocoagulation (Right, 2020).    Medications:   Bhavna has a current medication list which includes the following prescription(s): albuterol, albuterol, amlodipine, ascorbic acid (vitamin c), aspirin, atorvastatin, b complex vitamins, benztropine, capsaicin, cyanocobalamin, diclofenac sodium, fluticasone propionate, gabapentin, gabapentin, hydrocodone-acetaminophen, lisinopril, meclizine, multivitamin, risperidone, risperidone, cosentyx pen (2 pens), sulfasalazine, forteo, tramadol, trazodone, venlafaxine, vitamin d, and vitamin e, and the following Facility-Administered Medications: sodium chloride 0.9%, sodium chloride 0.9%, lidocaine (pf) 10 mg/ml (1%), phenylephrine hcl 2.5%, proparacaine, and tropicamide 1%.    Allergies:   Review of patient's allergies indicates:   Allergen Reactions    Etanercept Other (See Comments)      Other reaction(s): recurrent infections    Chloramphenicol sod succinate Hives and Other (See Comments)    Codeine Other (See Comments)     Other reaction(s): Stomach upset    Nickel sutures [surgical stainless steel] Dermatitis     Allergic contact dermatitis    Adhesive Rash     Other reaction(s): Rash        Imaging, MRI studies: 8/22/2020  Impression:  No acute intracranial process.    Prior Therapy: OP for back pain in early 2020  Social History:  lives alone  Falls: 1 fall tripping over something (several months ago)   DME: none  Home Environment: SSH ( 1-2 steps to get into home)   Exercise Routine / History: walk 3-5 times a week    Family Present at time of Eval: none, pt drove to session   Occupation: retired  Prior Level of Function: indep   Current Level of Function: indep but unsteady during dizziness    Pain:  Current 0/10, worst 0/10, best 0/10   Location: head and ears     Patient's goals: not to be dizziness    History of Current Symptoms   Triggers: Left head turns looking up and down,    Alleviating Factors: sitting still with head still   Description of symptoms: dizziness (sensation of spinning vs lightheadedness)   Onset: 2 months ago    Frequency: most days   Duration: minutes (2 minutes at most)   Positional changes: head turns   Limitations due to symptoms: walking steady and turning head    History of migraines: yes     Objective   - Follows commands: 100% of time   - Speech: no deficits    BP: not measured    Mental status: alert, oriented to person, place, and time, normal mood, behavior, speech, dress, motor activity, and thought processes  Appearance: Casually dressed  Behavior:  cooperative and but not flexible on the time she would come to therapy,  She insisted she had to come to therapy on tues and thurs at 11:00 and no other time.  Pt has psych history  Attention Span and Concentration:  Normal    Posture Alignment in sitting:   Head tends to rotate more toward the L but  "posture WFL     Posture Alignment in standing:   See mCTSIB    Sensation: not tested         Visual/Auditory:   R eye had weak eye abductor and she has been like that since birth.   Tracking/Smooth Pursuits:Impaired: sustained nystagmus with Left gaze   Saccades: Impaired: Left beating nystagmus and slow in L eye  Acuity:20/20 in Left eye per report from appt one year ago  Visual field: mild deficits in low fields on both sides   Convergence: intact  VOR: Impaired: R VOR impaired   Eye surgeries/disorder: none    ROM:   CERVICAL SPINE  Flexion 55 degrees (80-90 deg)  Extension 40 degrees (70-80 deg)  L side bend 30  degrees, R side bend 34 L degrees (20-45 degrees)  L rotation limited degrees, R rotation WFL degrees (70-90 degrees)    MCT-SIB:  (P= Pass, F= Fail; note any sway; hold each position for 30")  Condition 1: (firm surface/feet together/eyes open) P  Condition 2: (firm surface/feet together/eyes closed) Fail  Condition 3: (soft surface/feet together/eyes open) P with sway  Condition 4: (soft surface/feet together/eyes closed) Fail but ~10 minutes.      POSITIONAL CANAL TESTING  Looking for nystagmus (slow drift to affected side with quick correction away)    Dian Hallpike (posterior / CL anterior)   Right : (-) with dizziness reported on lying back and sitting up   Left: (+) for end range nystagmus in left eye but no torsional nystagmus in the R eye   Horizontal Canals   Right: Not tested   Left: Not tested    CMS Impairment/Limitation/Restriction for FOTO Vertigo Survey    Therapist reviewed FOTO scores for Bhavna Landry on 9/15/2020.   FOTO documents entered into [employer] - see Media section.    Limitation Score: 23%  DHI score 36/100       TREATMENT   Treatment Time In: 1045  Treatment Time Out: 1110  Total Treatment time separate from Evaluation: 25 minutes    Jessica participated in canalith repositioning manuevers x 2 trials Epley modified on flat mat with adjustable head for cervical extension.  Pt " reported back pain with Epley attempted over pillow.      Jessica participated in neuromuscular re-education activities to improve: Balance, Coordination, Sense, Proprioception and Posture for 15 minutes including education on why HEP was necessary and why the Epley maneuver may not be the method of treatment. The following activities were included:  - VOR x 1 horizonal  X 30''  - Smooth pursuits X 30'' in al directions      Home Exercises and Patient Education Provided    Education provided:   - need for vestibular rehab  - how balance works  - reason for HEP    Written Home Exercises Provided: yes.  Exercises were reviewed and Jessica was able to demonstrate them prior to the end of the session.  Jessica demonstrated fair  understanding of the education provided.     See EMR under Patient Instructions for exercises provided 9/15/2020.    Assessment   Bhavna is a 65 y.o. female referred to outpatient Physical Therapy with a medical diagnosis of vertigo. Patient presents with impaired VOR reflex to the R in the Left eye a failed mCTSIB test, impaired smooth pursuit in the L eye only, impaired R eye movement since birth, and a unclear L taj hallpike test.  She likely does not have L BBPV but vestibular hypofunction or impairment on the L.  She would benefit from 8 weeks of vestibular rehab and a vestibular HEP    Patient prognosis is Good.   Patient will benefit from skilled outpatient Physical Therapy to address the deficits stated above and in the chart below, provide patient/family education, and to maximize patient's level of independence.     Plan of care discussed with patient: Yes  Patient's spiritual, cultural and educational needs considered and patient is agreeable to the plan of care and goals as stated below:     Anticipated Barriers for therapy: none    Medical Necessity is demonstrated by the following  History  Co-morbidities and personal factors that may impact the plan of care Co-morbidities:   Allergy    Amblyopia   Anemia   Anticoagulant long-term use   Arthritis   Cataract   Depression   Dry eyes   Dry mouth   Duane's syndrome of right eye   Fibromyalgia   Fibromyalgia   Fractured hip   RIGHT HIP   GERD (gastroesophageal reflux disease)   History of psychiatric hospitalization   Hyperlipidemia   Hypertension   Kidney stone   Migraine headache   Osteoporosis   Psoriatic arthritis   Right knee pain   post knee replacement surgery (possible rejectiion of metal)   RLS (restless legs syndrome)   Schizophrenia   stable on meds   Urinary tract infection     Personal Factors:   coping style  social background  lifestyle  psych disorder     high   Examination  Body Structures and Functions, activity limitations and participation restrictions that may impact the plan of care Body Regions:   lower extremities  trunk   Eyes  Vestibular system  Neck    Body Systems:    gross symmetry  ROM  strength  gross coordinated movement  balance  gait  transfers  transitions    Participation Restrictions:   Bending to pick things up, walking with head turning, bed mobility    Activity limitations:   Learning and applying knowledge  no deficits    General Tasks and Commands  undertaking multiple tasks    Communication  no deficits    Mobility  lifting and carrying objects  walking    Self care  toileting  dressing    Domestic Life  shopping  doing house work (cleaning house, washing dishes, laundry)    Interactions/Relationships  no deficits    Life Areas  no deficits    Community and Social Life  walking in the neighborhood         high   Clinical Presentation unstable clinical presentation with unpredictable characteristics high   Decision Making/ Complexity Score: high     Goals: Short Term Goals 4 weeks  1. Pt will be indep with HEP.  2. Pt will be able to tolerate 15 minutes of vestibular adaptation exercises without increased dizziness.     Goals: Short Term Goals 8 weeks  1. Pt will score Pass on all components of the mCTSIB  without verbalizing feeling shaking unsteady falling in eyes open/closed on foam.   2. Pt will be 100% compliant with all gaze and occular HEPs issued.   3. Pt will report 20% or less on the FOTO Vertigo Functional Assessment for decreased symptoms of dizziness.    4. Pt will report 30 points or less on the FOTO DHI scale indicating increased confidence in balance and  mobility.    6  Pt will be able to perform all of her usual home/work/shopping duties with minimal symptoms of lightheadedness or dizziness.       Plan   Plan of care Certification: 9/15/2020 to 11/13/2020.    Outpatient Physical Therapy 2 times weekly for 8 weeks to include the following interventions: Gait Training, Manual Therapy, Moist Heat/ Ice, Neuromuscular Re-ed, Patient Education, Self Care, Therapeutic Activites and Therapeutic Exercise and canalith repositioning maneuvers.     Anna Jeffries, PT

## 2020-09-16 ENCOUNTER — OFFICE VISIT (OUTPATIENT)
Dept: PAIN MEDICINE | Facility: CLINIC | Age: 65
End: 2020-09-16
Payer: MEDICARE

## 2020-09-16 ENCOUNTER — TELEPHONE (OUTPATIENT)
Dept: INTERNAL MEDICINE | Facility: CLINIC | Age: 65
End: 2020-09-16

## 2020-09-16 VITALS
BODY MASS INDEX: 17.94 KG/M2 | WEIGHT: 98.13 LBS | HEART RATE: 75 BPM | SYSTOLIC BLOOD PRESSURE: 138 MMHG | DIASTOLIC BLOOD PRESSURE: 76 MMHG

## 2020-09-16 DIAGNOSIS — G89.29 CHRONIC LEFT HIP PAIN: ICD-10-CM

## 2020-09-16 DIAGNOSIS — M70.62 GREATER TROCHANTERIC BURSITIS OF LEFT HIP: Primary | ICD-10-CM

## 2020-09-16 DIAGNOSIS — M25.552 CHRONIC LEFT HIP PAIN: ICD-10-CM

## 2020-09-16 PROCEDURE — 3078F PR MOST RECENT DIASTOLIC BLOOD PRESSURE < 80 MM HG: ICD-10-PCS | Mod: CPTII,S$GLB,, | Performed by: PAIN MEDICINE

## 2020-09-16 PROCEDURE — 99214 OFFICE O/P EST MOD 30 MIN: CPT | Mod: S$GLB,,, | Performed by: PAIN MEDICINE

## 2020-09-16 PROCEDURE — 3075F SYST BP GE 130 - 139MM HG: CPT | Mod: CPTII,S$GLB,, | Performed by: PAIN MEDICINE

## 2020-09-16 PROCEDURE — 1159F PR MEDICATION LIST DOCUMENTED IN MEDICAL RECORD: ICD-10-PCS | Mod: S$GLB,,, | Performed by: PAIN MEDICINE

## 2020-09-16 PROCEDURE — 3008F PR BODY MASS INDEX (BMI) DOCUMENTED: ICD-10-PCS | Mod: CPTII,S$GLB,, | Performed by: PAIN MEDICINE

## 2020-09-16 PROCEDURE — 3075F PR MOST RECENT SYSTOLIC BLOOD PRESS GE 130-139MM HG: ICD-10-PCS | Mod: CPTII,S$GLB,, | Performed by: PAIN MEDICINE

## 2020-09-16 PROCEDURE — 99999 PR PBB SHADOW E&M-EST. PATIENT-LVL III: CPT | Mod: PBBFAC,,, | Performed by: PAIN MEDICINE

## 2020-09-16 PROCEDURE — 1125F PR PAIN SEVERITY QUANTIFIED, PAIN PRESENT: ICD-10-PCS | Mod: S$GLB,,, | Performed by: PAIN MEDICINE

## 2020-09-16 PROCEDURE — 3078F DIAST BP <80 MM HG: CPT | Mod: CPTII,S$GLB,, | Performed by: PAIN MEDICINE

## 2020-09-16 PROCEDURE — 99999 PR PBB SHADOW E&M-EST. PATIENT-LVL III: ICD-10-PCS | Mod: PBBFAC,,, | Performed by: PAIN MEDICINE

## 2020-09-16 PROCEDURE — 1125F AMNT PAIN NOTED PAIN PRSNT: CPT | Mod: S$GLB,,, | Performed by: PAIN MEDICINE

## 2020-09-16 PROCEDURE — 3008F BODY MASS INDEX DOCD: CPT | Mod: CPTII,S$GLB,, | Performed by: PAIN MEDICINE

## 2020-09-16 PROCEDURE — 1159F MED LIST DOCD IN RCRD: CPT | Mod: S$GLB,,, | Performed by: PAIN MEDICINE

## 2020-09-16 PROCEDURE — 99214 PR OFFICE/OUTPT VISIT, EST, LEVL IV, 30-39 MIN: ICD-10-PCS | Mod: S$GLB,,, | Performed by: PAIN MEDICINE

## 2020-09-16 RX ORDER — AMOXICILLIN 875 MG/1
875 TABLET, FILM COATED ORAL 2 TIMES DAILY
Qty: 14 TABLET | Refills: 0 | Status: SHIPPED | OUTPATIENT
Start: 2020-09-16 | End: 2020-12-01

## 2020-09-16 NOTE — TELEPHONE ENCOUNTER
Pt says she her glands under her neck are very swollen. Her throat is sore, nose is stopped up, she is congested and has a headache. Pt denies fever or any other symptoms. I offered pt appt but denied immediately. States she has an appt coming up. appt is not until October. Pt says she has tried several sinus medications with no relief. (could not provide names)

## 2020-09-16 NOTE — TELEPHONE ENCOUNTER
----- Message from Salena Lopes sent at 9/16/2020 12:34 PM CDT -----  Contact: YASEMINPHUONG [[id number]] @ [phone]   Would like to get medical advice.  Symptoms (please be specific):  glands swollen near chin and sore throat no fever  How long has patient had these symptoms:  X 3 days   Pharmacy name and phone #:  [facility] [phone] (Phone)  [phone] (Fax)  Any drug allergies (copy from chart):      Allergies/Adverse Reaction     EtanerceptOther (See Comments)  Chloramphenicol Sod SuccinateHives, Other (See Comments)  CodeineOther (See Comments)  Nickel Sutures [Surgical Stainless Steel]Dermatitis  AdhesiveRash    Would the patient rather a call back or a response via [facility]?:  Phone   Comments:

## 2020-09-17 ENCOUNTER — TELEPHONE (OUTPATIENT)
Dept: RHEUMATOLOGY | Facility: CLINIC | Age: 65
End: 2020-09-17

## 2020-09-17 ENCOUNTER — LAB VISIT (OUTPATIENT)
Dept: LAB | Facility: HOSPITAL | Age: 65
End: 2020-09-17
Attending: INTERNAL MEDICINE
Payer: MEDICARE

## 2020-09-17 DIAGNOSIS — M81.0 OSTEOPOROSIS, UNSPECIFIED OSTEOPOROSIS TYPE, UNSPECIFIED PATHOLOGICAL FRACTURE PRESENCE: Primary | ICD-10-CM

## 2020-09-17 DIAGNOSIS — Z79.899 ON SULFASALAZINE THERAPY: ICD-10-CM

## 2020-09-17 LAB
ALBUMIN SERPL BCP-MCNC: 3.9 G/DL (ref 3.5–5.2)
ALP SERPL-CCNC: 71 U/L (ref 55–135)
ALT SERPL W/O P-5'-P-CCNC: 26 U/L (ref 10–44)
ANION GAP SERPL CALC-SCNC: 9 MMOL/L (ref 8–16)
AST SERPL-CCNC: 32 U/L (ref 10–40)
BASOPHILS # BLD AUTO: 0.08 K/UL (ref 0–0.2)
BASOPHILS NFR BLD: 0.9 % (ref 0–1.9)
BILIRUB SERPL-MCNC: 0.4 MG/DL (ref 0.1–1)
BUN SERPL-MCNC: 7 MG/DL (ref 8–23)
CALCIUM SERPL-MCNC: 9 MG/DL (ref 8.7–10.5)
CHLORIDE SERPL-SCNC: 97 MMOL/L (ref 95–110)
CO2 SERPL-SCNC: 25 MMOL/L (ref 23–29)
CREAT SERPL-MCNC: 0.8 MG/DL (ref 0.5–1.4)
CRP SERPL-MCNC: 0.2 MG/L (ref 0–8.2)
DIFFERENTIAL METHOD: ABNORMAL
EOSINOPHIL # BLD AUTO: 0.2 K/UL (ref 0–0.5)
EOSINOPHIL NFR BLD: 2.3 % (ref 0–8)
ERYTHROCYTE [DISTWIDTH] IN BLOOD BY AUTOMATED COUNT: 12.8 % (ref 11.5–14.5)
ERYTHROCYTE [SEDIMENTATION RATE] IN BLOOD BY WESTERGREN METHOD: <2 MM/HR (ref 0–36)
EST. GFR  (AFRICAN AMERICAN): >60 ML/MIN/1.73 M^2
EST. GFR  (NON AFRICAN AMERICAN): >60 ML/MIN/1.73 M^2
GLUCOSE SERPL-MCNC: 95 MG/DL (ref 70–110)
HCT VFR BLD AUTO: 37.5 % (ref 37–48.5)
HGB BLD-MCNC: 11.5 G/DL (ref 12–16)
IMM GRANULOCYTES # BLD AUTO: 0.04 K/UL (ref 0–0.04)
IMM GRANULOCYTES NFR BLD AUTO: 0.5 % (ref 0–0.5)
LYMPHOCYTES # BLD AUTO: 2.3 K/UL (ref 1–4.8)
LYMPHOCYTES NFR BLD: 26.8 % (ref 18–48)
MCH RBC QN AUTO: 32.8 PG (ref 27–31)
MCHC RBC AUTO-ENTMCNC: 30.7 G/DL (ref 32–36)
MCV RBC AUTO: 107 FL (ref 82–98)
MONOCYTES # BLD AUTO: 0.7 K/UL (ref 0.3–1)
MONOCYTES NFR BLD: 8.4 % (ref 4–15)
NEUTROPHILS # BLD AUTO: 5.3 K/UL (ref 1.8–7.7)
NEUTROPHILS NFR BLD: 61.1 % (ref 38–73)
NRBC BLD-RTO: 0 /100 WBC
PLATELET # BLD AUTO: 375 K/UL (ref 150–350)
PMV BLD AUTO: 8.8 FL (ref 9.2–12.9)
POTASSIUM SERPL-SCNC: 4.6 MMOL/L (ref 3.5–5.1)
PROT SERPL-MCNC: 6.2 G/DL (ref 6–8.4)
RBC # BLD AUTO: 3.51 M/UL (ref 4–5.4)
SODIUM SERPL-SCNC: 131 MMOL/L (ref 136–145)
WBC # BLD AUTO: 8.67 K/UL (ref 3.9–12.7)

## 2020-09-17 PROCEDURE — 85652 RBC SED RATE AUTOMATED: CPT

## 2020-09-17 PROCEDURE — 36415 COLL VENOUS BLD VENIPUNCTURE: CPT | Mod: PO

## 2020-09-17 PROCEDURE — 85025 COMPLETE CBC W/AUTO DIFF WBC: CPT

## 2020-09-17 PROCEDURE — 80053 COMPREHEN METABOLIC PANEL: CPT

## 2020-09-17 PROCEDURE — 86140 C-REACTIVE PROTEIN: CPT

## 2020-09-17 NOTE — TELEPHONE ENCOUNTER
I spoke with Ms. Delphine Gonzales. Her insurance denied Tymlos and Forteo but she has been approved for Reclast. She is scheduled for her first infusion on 9/24. Discussed potential side effects including osteonecrosis of the jaw and atypical femoral fractures. All questions answered.

## 2020-09-18 ENCOUNTER — TELEPHONE (OUTPATIENT)
Dept: RHEUMATOLOGY | Facility: CLINIC | Age: 65
End: 2020-09-18

## 2020-09-18 DIAGNOSIS — D64.9 ANEMIA, UNSPECIFIED TYPE: Primary | ICD-10-CM

## 2020-09-18 DIAGNOSIS — Z79.899 OTHER LONG TERM (CURRENT) DRUG THERAPY: ICD-10-CM

## 2020-09-22 ENCOUNTER — CLINICAL SUPPORT (OUTPATIENT)
Dept: REHABILITATION | Facility: HOSPITAL | Age: 65
End: 2020-09-22
Attending: PHYSICIAN ASSISTANT
Payer: MEDICARE

## 2020-09-22 DIAGNOSIS — H83.2X2 VESTIBULAR DISEQUILIBRIUM, LEFT: ICD-10-CM

## 2020-09-22 DIAGNOSIS — R29.898 DECREASED ROM OF NECK: ICD-10-CM

## 2020-09-22 PROCEDURE — 97112 NEUROMUSCULAR REEDUCATION: CPT | Mod: PN

## 2020-09-22 NOTE — PROGRESS NOTES
"  Physical Therapy Treatment Note     Name: Bhavna Landry  Clinic Number: [id number]    Therapy Diagnosis:   Encounter Diagnoses   Name Primary?    Vestibular disequilibrium, left     Decreased ROM of neck      Physician: Karlee Tran PA-C    Visit Date: 9/22/2020    Physician Orders: Pt Eval and Treat  Medical Diagnosis: R42 (ICD-10-CM) - Vertigo  Evaluation Date: 09/15/2020  Authorization Period Expiration: 11/15/2020  Plan of Care Certification Period: 11/13/2020  Visit #/Visits authorized: 2/ 12     Time In: 01:00 pm   Time Out: 01:40 pm  Total Billable Time: 40 minutes (3 NMR)     Precautions: Fall and dizziness    Subjective     Pt reports: she's doing well today and her dizziness isn't too bad.  She was compliant with home exercise program.  Response to previous treatment: improvements in dizziness  Functional change: decreased frequency in dizziness    Dizziness: 3/10     Objective     Jessica participated in neuromuscular re-education activities to improve: Balance, Coordination, Kinesthetic, Sense, Proprioception and Posture for 40 minutes. The following activities were included:  Standing in corner:   -- VOR #1   3 x30" vertical, horizontal, and diagonal   -- Smooth pursuits  2x30" all directions    -- standing on Airex, narrow VIKASH,  3x30" all directions head turns  -- Standing ball toss hand to hand 3x30" track ball with eye movemts  -- Tandem walking   3 laps in //    [place] Hallpike:    - R: (-)   - L: (-)    Home Exercises Provided and Patient Education Provided     Education provided:   - Continue     Written Home Exercises Provided: Patient instructed to cont prior HEP.  Exercises were reviewed and Jessica was able to demonstrate them prior to the end of the session.  Jessica demonstrated good  understanding of the education provided.     See EMR under Patient Instructions for exercises provided prior visit.    Assessment   Pt participated well in therapy today with no habituation exercises " increasing her dizziness symptoms beyond her baseline for today. She was most challenged with narrow VIKASH on Airex with and without head turns. Pt experienced no LOB today.     Jessica is progressing well towards her goals.   Pt prognosis is Good.     Pt will continue to benefit from skilled outpatient physical therapy to address the deficits listed in the problem list box on initial evaluation, provide pt/family education and to maximize pt's level of independence in the home and community environment.   Pt's spiritual, cultural and educational needs considered and pt agreeable to plan of care and goals.     Anticipated barriers to physical therapy: None    Goals: Short Term Goals 4 weeks  1. Pt will be indep with HEP. (Progressing, Not MET)  2. Pt will be able to tolerate 15 minutes of vestibular adaptation exercises without increased dizziness. (MET, 9/22/2020)     Goals: Short Term Goals 8 weeks  1. Pt will score Pass on all components of the mCTSIB without verbalizing feeling shaking unsteady falling in eyes open/closed on foam. (Progressing, Not MET)  2. Pt will be 100% compliant with all gaze and occular HEPs issued. (Progressing, Not MET)  3. Pt will report 20% or less on the FOTO Vertigo Functional Assessment for decreased symptoms of dizziness. (Progressing, Not MET)   4. Pt will report 30 points or less on the FOTO DHI scale indicating increased confidence in balance and  mobility. (Progressing, Not MET)    6  Pt will be able to perform all of her usual home/work/shopping duties with minimal symptoms of lightheadedness or dizziness. (Progressing, Not MET)    Plan   Progress per POC    Anna Jeffries, PT

## 2020-09-24 ENCOUNTER — INFUSION (OUTPATIENT)
Dept: INFECTIOUS DISEASES | Facility: HOSPITAL | Age: 65
End: 2020-09-24
Attending: INTERNAL MEDICINE
Payer: MEDICARE

## 2020-09-24 VITALS
WEIGHT: 101.94 LBS | BODY MASS INDEX: 18.65 KG/M2 | SYSTOLIC BLOOD PRESSURE: 161 MMHG | TEMPERATURE: 98 F | OXYGEN SATURATION: 95 % | RESPIRATION RATE: 16 BRPM | DIASTOLIC BLOOD PRESSURE: 72 MMHG | HEART RATE: 67 BPM

## 2020-09-24 DIAGNOSIS — M81.0 OSTEOPOROSIS, POST-MENOPAUSAL: Primary | ICD-10-CM

## 2020-09-24 PROCEDURE — 25000003 PHARM REV CODE 250: Performed by: INTERNAL MEDICINE

## 2020-09-24 PROCEDURE — 63600175 PHARM REV CODE 636 W HCPCS: Performed by: INTERNAL MEDICINE

## 2020-09-24 PROCEDURE — 96365 THER/PROPH/DIAG IV INF INIT: CPT

## 2020-09-24 RX ORDER — ACETAMINOPHEN 500 MG
500 TABLET ORAL
Status: CANCELLED | OUTPATIENT
Start: 2020-09-24

## 2020-09-24 RX ORDER — ZOLEDRONIC ACID 5 MG/100ML
5 INJECTION, SOLUTION INTRAVENOUS
Status: CANCELLED | OUTPATIENT
Start: 2020-09-24

## 2020-09-24 RX ORDER — ZOLEDRONIC ACID 5 MG/100ML
5 INJECTION, SOLUTION INTRAVENOUS
Status: COMPLETED | OUTPATIENT
Start: 2020-09-24 | End: 2020-09-24

## 2020-09-24 RX ORDER — HEPARIN 100 UNIT/ML
500 SYRINGE INTRAVENOUS
Status: CANCELLED | OUTPATIENT
Start: 2020-09-24

## 2020-09-24 RX ORDER — ACETAMINOPHEN 500 MG
500 TABLET ORAL
Status: DISCONTINUED | OUTPATIENT
Start: 2020-09-24 | End: 2020-09-24 | Stop reason: HOSPADM

## 2020-09-24 RX ORDER — SODIUM CHLORIDE 0.9 % (FLUSH) 0.9 %
10 SYRINGE (ML) INJECTION
Status: CANCELLED | OUTPATIENT
Start: 2020-09-24

## 2020-09-24 RX ADMIN — ACETAMINOPHEN 500 MG: 500 TABLET ORAL at 11:09

## 2020-09-24 RX ADMIN — ZOLEDRONIC ACID 5 MG: 5 INJECTION, SOLUTION INTRAVENOUS at 11:09

## 2020-09-24 NOTE — PROGRESS NOTES
Pt received Reclast infusion over 20 min., tylenol 500 mg po as premed 30 mins prior to reclast.  Tolerated well, left unit in NAD.

## 2020-09-29 ENCOUNTER — OFFICE VISIT (OUTPATIENT)
Dept: PSYCHIATRY | Facility: CLINIC | Age: 65
End: 2020-09-29
Payer: COMMERCIAL

## 2020-09-29 DIAGNOSIS — F33.42 RECURRENT MAJOR DEPRESSIVE DISORDER, IN FULL REMISSION: ICD-10-CM

## 2020-09-29 DIAGNOSIS — G47.00 INSOMNIA, UNSPECIFIED TYPE: ICD-10-CM

## 2020-09-29 DIAGNOSIS — G25.9 EXTRAPYRAMIDAL SYNDROME: ICD-10-CM

## 2020-09-29 DIAGNOSIS — F20.0 PARANOID SCHIZOPHRENIA: Primary | ICD-10-CM

## 2020-09-29 PROCEDURE — 1100F PR PT FALLS ASSESS DOC 2+ FALLS/FALL W/INJURY/YR: ICD-10-PCS | Mod: CPTII,,, | Performed by: INTERNAL MEDICINE

## 2020-09-29 PROCEDURE — 3288F PR FALLS RISK ASSESSMENT DOCUMENTED: ICD-10-PCS | Mod: CPTII,,, | Performed by: INTERNAL MEDICINE

## 2020-09-29 PROCEDURE — 1100F PTFALLS ASSESS-DOCD GE2>/YR: CPT | Mod: CPTII,,, | Performed by: INTERNAL MEDICINE

## 2020-09-29 PROCEDURE — 99213 OFFICE O/P EST LOW 20 MIN: CPT | Mod: 95,,, | Performed by: INTERNAL MEDICINE

## 2020-09-29 PROCEDURE — 3288F FALL RISK ASSESSMENT DOCD: CPT | Mod: CPTII,,, | Performed by: INTERNAL MEDICINE

## 2020-09-29 PROCEDURE — 99213 PR OFFICE/OUTPT VISIT, EST, LEVL III, 20-29 MIN: ICD-10-PCS | Mod: 95,,, | Performed by: INTERNAL MEDICINE

## 2020-09-29 PROCEDURE — 1159F MED LIST DOCD IN RCRD: CPT | Mod: ,,, | Performed by: INTERNAL MEDICINE

## 2020-09-29 PROCEDURE — 1159F PR MEDICATION LIST DOCUMENTED IN MEDICAL RECORD: ICD-10-PCS | Mod: ,,, | Performed by: INTERNAL MEDICINE

## 2020-09-29 RX ORDER — RISPERIDONE 2 MG/1
2 TABLET ORAL EVERY MORNING
Qty: 90 TABLET | Refills: 3 | Status: SHIPPED | OUTPATIENT
Start: 2020-09-29 | End: 2021-11-09 | Stop reason: SDUPTHER

## 2020-09-29 RX ORDER — RISPERIDONE 4 MG/1
4 TABLET ORAL NIGHTLY
Qty: 90 TABLET | Refills: 3 | Status: SHIPPED | OUTPATIENT
Start: 2020-09-29 | End: 2021-09-29

## 2020-09-29 NOTE — PROGRESS NOTES
OUTPATIENT PSYCHIATRY RETURN VISIT    ENCOUNTER DATE:  10/3/2020  SITE:  [facility], [facility]  LENGTH OF SESSION:  10 minutes    The patient location is:  [place] ([place])  The chief complaint leading to consultation is:  Mood    Visit type:  audiovisual    Face to Face time with patient:  10 minutes  15 minutes of total time spent on the encounter, which includes face to face time and non-face to face time preparing to see the patient (eg, review of tests), Obtaining and/or reviewing separately obtained history, Documenting clinical information in the electronic or other health record, Independently interpreting results (not separately reported) and communicating results to the patient/family/caregiver, or Care coordination (not separately reported).     Each patient to whom he or she provides medical services by telemedicine is:  (1) informed of the relationship between the physician and patient and the respective role of any other health care provider with respect to management of the patient; and (2) notified that he or she may decline to receive medical services by telemedicine and may withdraw from such care at any time.    CHIEF COMPLAINT:  Mood      HISTORY OF PRESENTING ILLNESS:  Bhavna Landry is a 65 y.o. female with history of Paranoid Schizophrenia, Depression, and Insomnia who presents for follow up appointment.      Plan at last appointment on 7/28/2020:  · Patient reporting recent stress and anxiety.  Possibly some mild paranoia versus stress from family conflict.  Will first try increasing Neurontin to 100mg/100mg/400mg to help both pain and anxiety.  Will increase dose as she tolerates.    · Continue Risperdal 4mg/2mg and Cogentin 0.5mg BID.  May need to consider increasing Risperdal in the future if any other signs of paranoia.    · Continue Effexor 150mg daily for depression.  · Continue Trazodone 50mg qHS for insomnia.  · Discussed with patient and daughter informed consent,  risks versus benefits, alternative treatments, side effect profile and the inherent unpredictability of individual responses to these treatments.  The patient's daughter expresses understanding of the above and displays the capacity to agree with this current plan.  · Discussed with patient upcoming change in schedule.    History as told by patient:  Says she is doing well.  States stress has improved since last appointment.  Denies paranoia or AVH.  Denies depression or anxiety.  Medications are working well.  Denies SI or HI.  She does believe Neurontin increase has been helpful.  She thinks she is currently taking 100mg in the morning and 500mg at night but is not sure since daughter gives her her medications.  Plans to try 100mg/100mg/400mg.  Says she has been feeling some fatigue during the day.      Medication side effects:  No  Medication compliance:  Yes    PSYCHIATRIC REVIEW OF SYSTEMS:  Trouble with sleep:  Denies  Appetite changes:  Denies  Weight changes:  Denies  Lack of energy:  Denies  Anhedonia:  Denies  Somatic symptoms:  Denies  Libido:  Denies  Anxiety/panic:  Improved  Guilty/hopeless:  Denies  Self-injurious behavior/risky behavior:  Denies  Any drugs:  Denies  Alcohol:  Denies    MEDICAL REVIEW OF SYSTEMS:  Complete review of systems performed covering Constitutional, Musculoskeletal, Neurologic.  All systems negative except for that covered in HPI.    PAST PSYCHIATRIC, MEDICAL, AND SOCIAL HISTORY REVIEWED  The patient's past medical, family and social history have been reviewed and updated as appropriate within the electronic medical record - see encounter notes.    MEDICATIONS:    Current Outpatient Medications:     albuterol (ACCUNEB) 1.25 mg/3 mL Nebu, TAKE 3 ML BY NEBULIZATION EVERY 6 HOURS AS NEEDED, Disp: 75 mL, Rfl: 0    albuterol (PROAIR HFA) 90 mcg/actuation inhaler, 2 puffs qid prn, Disp: 54 g, Rfl: 3    amLODIPine (NORVASC) 10 MG tablet, Take 1 tablet (10 mg total) by mouth once  daily., Disp: 90 tablet, Rfl: 1    amoxicillin (AMOXIL) 875 MG tablet, Take 1 tablet (875 mg total) by mouth 2 (two) times daily., Disp: 14 tablet, Rfl: 0    ascorbic acid, vitamin C, (VITAMIN C) 500 MG tablet, Take 1,500 mg by mouth once daily., Disp: , Rfl:     aspirin 325 MG tablet, Take 325 mg by mouth once daily., Disp: , Rfl:     atorvastatin (LIPITOR) 80 MG tablet, TAKE 1 TABLET(80 MG) BY MOUTH EVERY DAY, Disp: 90 tablet, Rfl: 3    b complex vitamins tablet, Take 1 tablet by mouth once daily., Disp: , Rfl:     benztropine (COGENTIN) 0.5 MG tablet, Take 1 tablet (0.5 mg total) by mouth 2 (two) times daily., Disp: 180 tablet, Rfl: 3    capsaicin 0.025 % PtMd, Apply 1 patch topically 2 (two) times daily as needed., Disp: 30 each, Rfl: 0    cyanocobalamin 500 MCG tablet, Take 500 mcg by mouth once daily., Disp: , Rfl:     diclofenac sodium (VOLTAREN) 1 % Gel, Apply 2 g topically 4 (four) times daily as needed., Disp: 3 Tube, Rfl: 3    fluticasone propionate (FLONASE) 50 mcg/actuation nasal spray, 1 spray (50 mcg total) by Each Nostril route once daily., Disp: 18.2 mL, Rfl: 0    gabapentin (NEURONTIN) 100 MG capsule, Take 1 tab by mouth in the morning and middle of the day., Disp: 180 capsule, Rfl: 1    gabapentin (NEURONTIN) 400 MG capsule, Take 1 capsule (400 mg total) by mouth every evening., Disp: 90 capsule, Rfl: 1    HYDROcodone-acetaminophen (NORCO) 5-325 mg per tablet, Take 1 tablet by mouth every 8 (eight) hours as needed for Pain., Disp: 30 tablet, Rfl: 0    lisinopriL 10 MG tablet, Take 1 tablet (10 mg total) by mouth once daily., Disp: 90 tablet, Rfl: 1    meclizine (ANTIVERT) 12.5 mg tablet, Take 1 tablet (12.5 mg total) by mouth 2 (two) times daily as needed., Disp: 30 tablet, Rfl: 0    multivitamin (THERAGRAN) per tablet, Take 1 tablet by mouth once daily., Disp: , Rfl:     risperiDONE (RISPERDAL) 2 MG tablet, Take 1 tablet (2 mg total) by mouth every morning., Disp: 90 tablet, Rfl:  3    risperiDONE (RISPERDAL) 4 MG tablet, Take 1 tablet (4 mg total) by mouth every evening., Disp: 90 tablet, Rfl: 3    secukinumab (COSENTYX PEN, 2 PENS,) 150 mg/mL PnIj, Inject 300 mg into the skin every 30 days., Disp: 6 Syringe, Rfl: 0    sulfaSALAzine (AZULFIDINE) 500 MG EC tablet, TAKE 3 TABLETS BY MOUTH  TWICE DAILY, Disp: 540 tablet, Rfl: 0    teriparatide (FORTEO) 20 mcg/dose - 600 mcg/2.4 mL PnIj, Inject 0.08 mLs (20 mcg total) into the skin once daily., Disp: 2.4 mL, Rfl: 2    traMADoL (ULTRAM) 50 mg tablet, 1-2 tabs po q 12 h prn pain, Disp: 40 each, Rfl: 0    traZODone (DESYREL) 50 MG tablet, Take 1 tablet (50 mg total) by mouth nightly as needed for Insomnia., Disp: 90 tablet, Rfl: 3    venlafaxine (EFFEXOR-XR) 150 MG Cp24, Take 1 capsule (150 mg total) by mouth once daily., Disp: 90 capsule, Rfl: 3    vitamin D (VITAMIN D3) 1000 units Tab, Take 1,000 Units by mouth once daily., Disp: , Rfl:     vitamin E 200 UNIT capsule, Take 200 Units by mouth once daily., Disp: , Rfl:   No current facility-administered medications for this visit.     Facility-Administered Medications Ordered in Other Visits:     0.9%  NaCl infusion, 500 mL, Intravenous, Continuous, Michelle Pineda Jr., MD    0.9%  NaCl infusion, 500 mL, Intravenous, Continuous, Michelle Pineda Jr., MD    lidocaine (PF) 10 mg/ml (1%) injection 10 mg, 1 mL, Intradermal, Once, Michelle Pineda Jr., MD    phenylephrine HCL 2.5% ophthalmic solution 1 drop, 1 drop, Right Eye, On Call Procedure, Karen Song MD, 1 drop at 08/01/19 0648    proparacaine 0.5 % ophthalmic solution 1 drop, 1 drop, Right Eye, On Call Procedure, Karen Song MD, 1 drop at 08/01/19 0638    tropicamide 1% ophthalmic solution 1 drop, 1 drop, Right Eye, On Call Procedure, Karen Song MD, 1 drop at 08/01/19 0639    ALLERGIES:  Review of patient's allergies indicates:   Allergen Reactions    Etanercept Other (See Comments)     Other reaction(s):  "recurrent infections    Chloramphenicol sod succinate Hives and Other (See Comments)    Codeine Other (See Comments)     Other reaction(s): Stomach upset    Nickel sutures [surgical stainless steel] Dermatitis     Allergic contact dermatitis    Adhesive Rash     Other reaction(s): Rash       PSYCHIATRIC EXAM:  There were no vitals filed for this visit.  Appearance:  Well groomed, appearing healthy and of stated age  Behavior:  Cooperative, pleasant, no psychomotor agitation or retardation  Speech:  Normal rate, rhythm, prosody, and volume  Mood:  "Better"  Affect:  Congruent  Thought Process:  Linear, logical, goal directed  Thought Content:  Negative for suicidal ideation, homicidal ideation, delusions or hallucinations.  Mild paranoia.  Associations:  Loose  Memory:  Poor  Level of Consciousness/Orientation:  Grossly intact  Fund of Knowledge:  Fair  Attention:  Good  Language:  Fluent, able to name abstract and concrete objects  Insight:  Fair  Judgment:  Intact  Psychomotor signs:  +Orobuccal EPS  Gait:  Unable to assess via virtual visit      RELEVANT LABS/STUDIES:    Lab Results   Component Value Date    WBC 8.67 09/17/2020    HGB 11.5 (L) 09/17/2020    HCT 37.5 09/17/2020     (H) 09/17/2020     (H) 09/17/2020     BMP  Lab Results   Component Value Date     (L) 09/17/2020    K 4.6 09/17/2020    CL 97 09/17/2020    CO2 25 09/17/2020    BUN 7 (L) 09/17/2020    CREATININE 0.8 09/17/2020    CALCIUM 9.0 09/17/2020    ANIONGAP 9 09/17/2020    ESTGFRAFRICA >60.0 09/17/2020    EGFRNONAA >60.0 09/17/2020     Lab Results   Component Value Date    ALT 26 09/17/2020    AST 32 09/17/2020    GGT 91 (H) 07/28/2014    ALKPHOS 71 09/17/2020    BILITOT 0.4 09/17/2020     Lab Results   Component Value Date    TSH 2.001 11/15/2019     Lab Results   Component Value Date    HGBA1C 5.3 12/11/2018       IMPRESSION:    Bhavna Landry is a 65 y.o. female with history of Paranoid Schizophrenia, Depression, and " Insomnia who presents for follow up appointment.    Status/Progress:  Based on the examination today, the patient's problem(s) is/are improving.  New problems have not been presented today.    Risk Parameters:  Patient reports no suicidal ideation  Patient reports no homicidal ideation  Patient reports no self-injurious behavior  Patient reports no violent behavior      DIAGNOSES:    ICD-10-CM ICD-9-CM   1. Paranoid schizophrenia  F20.0 295.30   2. Recurrent major depressive disorder, in full remission  F33.42 296.36   3. Insomnia, unspecified type  G47.00 780.52   4. Extrapyramidal syndrome  G25.9 333.90       PLAN:  · Will change Risperdal to 2mg in the morning and 4mg at night to help with daytime fatigue.  · Continue Effexor 150mg daily, Trazodone 50mg qHS, Cogentin 0.5mg BID, and Neurontin 100mg/100mg/400mg to help both pain and anxiety.  · Discussed with patient and daughter informed consent, risks versus benefits, alternative treatments, side effect profile and the inherent unpredictability of individual responses to these treatments.  The patient's daughter expresses understanding of the above and displays the capacity to agree with this current plan.    RETURN TO CLINIC:  Follow up in about 3 months (around 12/29/2020).

## 2020-09-30 ENCOUNTER — PATIENT OUTREACH (OUTPATIENT)
Dept: OTHER | Facility: OTHER | Age: 65
End: 2020-09-30

## 2020-09-30 NOTE — PROGRESS NOTES
Digital Medicine: Health  Follow-Up    The history is provided by the patient.             Reason for review: Blood pressure at goal      Additional Follow-up details: Brief encounter.  Patient reports she is doing well. No questions or concerns at this time.          Diet-Not assessed          Physical Activity-Not assessed    Medication Adherence-Medication Adherence not addressed.      Substance, Sleep, Stress-Not assessed      Continue current diet/physical activity routine.       Addressed patient questions and patient has my contact information if needed prior to next outreach. Patient verbalizes understanding.      Explained the importance of self-monitoring and medication adherence. Encouraged the patient to communicate with their health  for lifestyle modifications to help improve or maintain a healthy lifestyle.            There are no preventive care reminders to display for this patient.    Last 5 Patient Entered Readings                                      Current 30 Day Average: 126/64     Recent Readings 9/29/2020 9/27/2020 9/25/2020 9/24/2020 9/23/2020    SBP (mmHg) 142 131 108 94 141    DBP (mmHg) 54 56 51 52 59    Pulse 73 67 72 76 70

## 2020-10-01 ENCOUNTER — CLINICAL SUPPORT (OUTPATIENT)
Dept: REHABILITATION | Facility: HOSPITAL | Age: 65
End: 2020-10-01
Payer: MEDICARE

## 2020-10-01 DIAGNOSIS — H83.2X2 VESTIBULAR DISEQUILIBRIUM, LEFT: ICD-10-CM

## 2020-10-01 DIAGNOSIS — R29.898 DECREASED ROM OF NECK: ICD-10-CM

## 2020-10-01 PROCEDURE — 97112 NEUROMUSCULAR REEDUCATION: CPT | Mod: PN,CQ

## 2020-10-01 NOTE — PROGRESS NOTES
"  Physical Therapy Treatment Note     Name: Bhavna Landry  Clinic Number: [id number]    Therapy Diagnosis:        Encounter Diagnoses   Name Primary?    Vestibular disequilibrium, left      Decreased ROM of neck          Physician: Karlee Tran PA-C    Visit Date: 10/1/2020    Physician Orders: Pt Eval and Treat  Medical Diagnosis: R42 (ICD-10-CM) - Vertigo  Evaluation Date: 09/15/2020  Authorization Period Expiration: 11/15/2020  Plan of Care Certification Period: 11/13/2020  Visit #/Visits authorized: 2/ 12     Time In: 11:50 PM  Time Out: 12:30 PM  Total Billable Time:  40 minutes (3 NMR)     Precautions: Fall and dizziness    Subjective     Pt reports: slight dizziness   She was compliant with home exercise program.  Response to previous treatment: improvements in dizziness  Functional change: decreased frequency and intensity of dizziness,     Dizziness: 3/10     Objective     Jessica participated in neuromuscular re-education activities to improve: Balance, Coordination, Kinesthetic, Sense, Proprioception and Posture for 40 minutes. The following activities were included:  Standing in facing wall in private room: Post its used   -- VOR #1   2 x30" vertical, no visual change, no symptom elecitation               -- VOR #1                               2 x 30" horizontal, no visual change, no dizziness  elecitation              -- VOR #!                                2x30"  Diagonal, Left eye nystagmus, reports DECREASED dizziness    -- Smooth pursuits  2x30" all directions , Left eye nystagmus, reports DECREASED dizziness              -- Saccades                            3x30" horizontal , Left eye nystagmus, no dizziness elicitation      -- standing on Airex, narrow VIKASH,  3x30" each  R<>L, up<>down  head turns (eyes open)  -- standing on Airex, narrow VIKASH      2x10" eyes closed  -- Walking ball toss hand to hand     100 ft track ball with eyes horizontally  -- Walking  ball toss up/down         100 ft " "track ball with eyes vertically                        -- Tandem walking   3 laps in // constant UE handrail support  -- Tandem walking (semi)                 20 ft out of //bars no UE support  -- Tandem static                                 3x20"  Min A/ CGA      -- Transitional changes Sit<>side ly<>sit  NEXT                             Dian Channing:    - R: (-)   - L: (-)    Home Exercises Provided and Patient Education Provided     Education provided:   - Continue     Written Home Exercises Provided: Patient instructed to cont prior HEP.  Exercises were reviewed and Jessica was able to demonstrate them prior to the end of the session.  Jessica demonstrated good  understanding of the education provided.     See EMR under Patient Instructions for exercises provided prior visit.    Assessment   Jessica tolerated PT treatment well today. Arrived a little late and insisted she leave at 12:30. Progressed with additional oculomotor and balance challenges. She was most challenged with tandem progression challenges. Pt experienced multiple LOB today static standing in tandem initially but once stabilized was able to maintain 10-15 sec w/o support. Remarkably reported DECREASED dizziness with noted locomotor exercises.    Jessica is progressing well towards her goals.   Pt prognosis is Good.     Pt will continue to benefit from skilled outpatient physical therapy to address the deficits listed in the problem list box on initial evaluation, provide pt/family education and to maximize pt's level of independence in the home and community environment.   Pt's spiritual, cultural and educational needs considered and pt agreeable to plan of care and goals.     Anticipated barriers to physical therapy: None    Goals: Short Term Goals 4 weeks  1. Pt will be indep with HEP. (Progressing, Not MET)  2. Pt will be able to tolerate 15 minutes of vestibular adaptation exercises without increased dizziness. (MET, 9/22/2020)     Goals: Short " Term Goals 8 weeks  1. Pt will score Pass on all components of the mCTSIB without verbalizing feeling shaking unsteady falling in eyes open/closed on foam. (Progressing, Not MET)  2. Pt will be 100% compliant with all gaze and occular HEPs issued. (Progressing, Not MET)  3. Pt will report 20% or less on the FOTO Vertigo Functional Assessment for decreased symptoms of dizziness. (Progressing, Not MET)   4. Pt will report 30 points or less on the FOTO DHI scale indicating increased confidence in balance and  mobility. (Progressing, Not MET)    6  Pt will be able to perform all of her usual home/work/shopping duties with minimal symptoms of lightheadedness or dizziness. (Progressing, Not MET)    Plan   Progress per RACHEL Haley, SCOTT

## 2020-10-05 ENCOUNTER — TELEPHONE (OUTPATIENT)
Dept: PAIN MEDICINE | Facility: CLINIC | Age: 65
End: 2020-10-05

## 2020-10-05 ENCOUNTER — PATIENT MESSAGE (OUTPATIENT)
Dept: RESEARCH | Facility: OTHER | Age: 65
End: 2020-10-05

## 2020-10-05 NOTE — TELEPHONE ENCOUNTER
----- Message from Christina Hurtado MA sent at 10/2/2020  3:57 PM CDT -----    ----- Message -----  From: Martha Wayne  Sent: 10/2/2020  11:09 AM CDT  To: [facility] Staff    Type:  Patient Returning Call    Who Called:pt   Who Left Message for Patient: pt  Does the patient know what this is regarding?: pt want to know how often she can she  take  her steroid shot    Would the patient rather a call back or a response via [facility]?  Call   Best Call Back Number: [phone]  Additional Information:  call back

## 2020-10-05 NOTE — TELEPHONE ENCOUNTER
Pt states she is having left hip pain again. Pt had a GTB in office in September. Pt requesting for another injection please advise.

## 2020-10-07 ENCOUNTER — OFFICE VISIT (OUTPATIENT)
Dept: INTERNAL MEDICINE | Facility: CLINIC | Age: 65
End: 2020-10-07
Payer: MEDICARE

## 2020-10-07 VITALS
HEART RATE: 56 BPM | BODY MASS INDEX: 18.7 KG/M2 | SYSTOLIC BLOOD PRESSURE: 115 MMHG | HEIGHT: 62 IN | DIASTOLIC BLOOD PRESSURE: 66 MMHG | OXYGEN SATURATION: 98 % | WEIGHT: 101.63 LBS

## 2020-10-07 DIAGNOSIS — R22.1 NECK MASS: ICD-10-CM

## 2020-10-07 DIAGNOSIS — R42 VERTIGO: ICD-10-CM

## 2020-10-07 DIAGNOSIS — G89.4 CHRONIC PAIN SYNDROME: ICD-10-CM

## 2020-10-07 DIAGNOSIS — I10 ESSENTIAL HYPERTENSION: Primary | Chronic | ICD-10-CM

## 2020-10-07 PROCEDURE — 1101F PR PT FALLS ASSESS DOC 0-1 FALLS W/OUT INJ PAST YR: ICD-10-PCS | Mod: CPTII,S$GLB,, | Performed by: INTERNAL MEDICINE

## 2020-10-07 PROCEDURE — 3074F SYST BP LT 130 MM HG: CPT | Mod: CPTII,S$GLB,, | Performed by: INTERNAL MEDICINE

## 2020-10-07 PROCEDURE — 3008F PR BODY MASS INDEX (BMI) DOCUMENTED: ICD-10-PCS | Mod: CPTII,S$GLB,, | Performed by: INTERNAL MEDICINE

## 2020-10-07 PROCEDURE — 3078F DIAST BP <80 MM HG: CPT | Mod: CPTII,S$GLB,, | Performed by: INTERNAL MEDICINE

## 2020-10-07 PROCEDURE — 3008F BODY MASS INDEX DOCD: CPT | Mod: CPTII,S$GLB,, | Performed by: INTERNAL MEDICINE

## 2020-10-07 PROCEDURE — 1159F MED LIST DOCD IN RCRD: CPT | Mod: S$GLB,,, | Performed by: INTERNAL MEDICINE

## 2020-10-07 PROCEDURE — 99999 PR PBB SHADOW E&M-EST. PATIENT-LVL III: ICD-10-PCS | Mod: PBBFAC,,, | Performed by: INTERNAL MEDICINE

## 2020-10-07 PROCEDURE — 1159F PR MEDICATION LIST DOCUMENTED IN MEDICAL RECORD: ICD-10-PCS | Mod: S$GLB,,, | Performed by: INTERNAL MEDICINE

## 2020-10-07 PROCEDURE — 99214 PR OFFICE/OUTPT VISIT, EST, LEVL IV, 30-39 MIN: ICD-10-PCS | Mod: S$GLB,,, | Performed by: INTERNAL MEDICINE

## 2020-10-07 PROCEDURE — 99999 PR PBB SHADOW E&M-EST. PATIENT-LVL III: CPT | Mod: PBBFAC,,, | Performed by: INTERNAL MEDICINE

## 2020-10-07 PROCEDURE — 3074F PR MOST RECENT SYSTOLIC BLOOD PRESSURE < 130 MM HG: ICD-10-PCS | Mod: CPTII,S$GLB,, | Performed by: INTERNAL MEDICINE

## 2020-10-07 PROCEDURE — 1101F PT FALLS ASSESS-DOCD LE1/YR: CPT | Mod: CPTII,S$GLB,, | Performed by: INTERNAL MEDICINE

## 2020-10-07 PROCEDURE — 99214 OFFICE O/P EST MOD 30 MIN: CPT | Mod: S$GLB,,, | Performed by: INTERNAL MEDICINE

## 2020-10-07 PROCEDURE — 3078F PR MOST RECENT DIASTOLIC BLOOD PRESSURE < 80 MM HG: ICD-10-PCS | Mod: CPTII,S$GLB,, | Performed by: INTERNAL MEDICINE

## 2020-10-07 NOTE — PROGRESS NOTES
Subjective:       Patient ID: Bhavna Landry is a 65 y.o. female.    Chief Complaint: Follow-up    HPI   127/40 is average reading by dig htn, according to pt.  115/66 by dig htn yesterday.    She had hip bursa infection which helped for 3 days, and is to have another.  She never saw PMR.   Her pain has not really improved.  She stopped hydrocodone, as ineffective.  She went back to tramadol, which she takes only when pain severe.  She takes aleve now and then.  Last filled tramadol #40- august 19.  She is not using walker.    Interval vertigo.    She was worried about a gland below L jaw.  Shrunk with antibiotic.  Not painful.  Review of Systems   Constitutional: Negative for fever and unexpected weight change.   HENT: Negative for nasal congestion and postnasal drip.    Eyes: Negative for pain, discharge and visual disturbance.   Respiratory: Negative for cough, chest tightness, shortness of breath and wheezing.    Cardiovascular: Negative for chest pain and leg swelling.   Gastrointestinal: Negative for abdominal pain, constipation, diarrhea and nausea.   Genitourinary: Negative for difficulty urinating, dysuria and hematuria.   Integumentary:  Negative for rash.   Neurological: Negative for headaches.   Psychiatric/Behavioral: Negative for dysphoric mood and sleep disturbance. The patient is not nervous/anxious.          Objective:      Physical Exam  Constitutional:       Appearance: Normal appearance. She is not ill-appearing or diaphoretic.   Neck:      Comments: Salivary gland L submental area.  Neurological:      General: No focal deficit present.      Mental Status: She is alert.         Assessment:       1. Essential hypertension    2. Vertigo    3. Chronic pain syndrome    4. Neck mass        Plan:       Bhavna was seen today for follow-up.    Diagnoses and all orders for this visit:    Essential hypertension    Vertigo    Chronic pain syndrome    Neck mass  -     US Soft Tissue Head Neck Thyroid;  Future

## 2020-10-09 ENCOUNTER — PES CALL (OUTPATIENT)
Dept: ADMINISTRATIVE | Facility: CLINIC | Age: 65
End: 2020-10-09

## 2020-10-11 ENCOUNTER — PATIENT OUTREACH (OUTPATIENT)
Dept: ADMINISTRATIVE | Facility: OTHER | Age: 65
End: 2020-10-11

## 2020-10-11 NOTE — PROGRESS NOTES
Care Everywhere: updated  Immunization: updated  Health Maintenance: updated  Media Review:review for outside colon cancer and mammogram report   Legacy Review:   Order placed:   Upcoming appts:  Mammogram scheduling ticket sent to patient's portal on 8/17/2020

## 2020-10-12 ENCOUNTER — OFFICE VISIT (OUTPATIENT)
Dept: RHEUMATOLOGY | Facility: CLINIC | Age: 65
End: 2020-10-12
Payer: MEDICARE

## 2020-10-12 ENCOUNTER — PATIENT MESSAGE (OUTPATIENT)
Dept: PHARMACY | Facility: CLINIC | Age: 65
End: 2020-10-12

## 2020-10-12 VITALS
HEART RATE: 76 BPM | SYSTOLIC BLOOD PRESSURE: 135 MMHG | DIASTOLIC BLOOD PRESSURE: 78 MMHG | HEIGHT: 62 IN | WEIGHT: 103 LBS | BODY MASS INDEX: 18.95 KG/M2

## 2020-10-12 DIAGNOSIS — Z79.899 OTHER LONG TERM (CURRENT) DRUG THERAPY: ICD-10-CM

## 2020-10-12 DIAGNOSIS — L40.50 PSA (PSORIATIC ARTHRITIS): ICD-10-CM

## 2020-10-12 DIAGNOSIS — Z79.899 ENCOUNTER FOR MONITORING SULFASALAZINE THERAPY: ICD-10-CM

## 2020-10-12 DIAGNOSIS — M81.0 OSTEOPOROSIS, UNSPECIFIED OSTEOPOROSIS TYPE, UNSPECIFIED PATHOLOGICAL FRACTURE PRESENCE: Primary | ICD-10-CM

## 2020-10-12 DIAGNOSIS — Z51.81 ENCOUNTER FOR MONITORING SULFASALAZINE THERAPY: ICD-10-CM

## 2020-10-12 DIAGNOSIS — M79.7 FIBROMYALGIA: ICD-10-CM

## 2020-10-12 PROCEDURE — 1159F MED LIST DOCD IN RCRD: CPT | Mod: GC,S$GLB,, | Performed by: STUDENT IN AN ORGANIZED HEALTH CARE EDUCATION/TRAINING PROGRAM

## 2020-10-12 PROCEDURE — 3078F DIAST BP <80 MM HG: CPT | Mod: CPTII,GC,S$GLB, | Performed by: STUDENT IN AN ORGANIZED HEALTH CARE EDUCATION/TRAINING PROGRAM

## 2020-10-12 PROCEDURE — 3008F BODY MASS INDEX DOCD: CPT | Mod: CPTII,GC,S$GLB, | Performed by: STUDENT IN AN ORGANIZED HEALTH CARE EDUCATION/TRAINING PROGRAM

## 2020-10-12 PROCEDURE — 3078F PR MOST RECENT DIASTOLIC BLOOD PRESSURE < 80 MM HG: ICD-10-PCS | Mod: CPTII,GC,S$GLB, | Performed by: STUDENT IN AN ORGANIZED HEALTH CARE EDUCATION/TRAINING PROGRAM

## 2020-10-12 PROCEDURE — 99214 OFFICE O/P EST MOD 30 MIN: CPT | Mod: GC,S$GLB,, | Performed by: STUDENT IN AN ORGANIZED HEALTH CARE EDUCATION/TRAINING PROGRAM

## 2020-10-12 PROCEDURE — 1101F PR PT FALLS ASSESS DOC 0-1 FALLS W/OUT INJ PAST YR: ICD-10-PCS | Mod: CPTII,GC,S$GLB, | Performed by: STUDENT IN AN ORGANIZED HEALTH CARE EDUCATION/TRAINING PROGRAM

## 2020-10-12 PROCEDURE — 99214 PR OFFICE/OUTPT VISIT, EST, LEVL IV, 30-39 MIN: ICD-10-PCS | Mod: GC,S$GLB,, | Performed by: STUDENT IN AN ORGANIZED HEALTH CARE EDUCATION/TRAINING PROGRAM

## 2020-10-12 PROCEDURE — 3008F PR BODY MASS INDEX (BMI) DOCUMENTED: ICD-10-PCS | Mod: CPTII,GC,S$GLB, | Performed by: STUDENT IN AN ORGANIZED HEALTH CARE EDUCATION/TRAINING PROGRAM

## 2020-10-12 PROCEDURE — 99999 PR PBB SHADOW E&M-EST. PATIENT-LVL III: ICD-10-PCS | Mod: PBBFAC,GC,, | Performed by: STUDENT IN AN ORGANIZED HEALTH CARE EDUCATION/TRAINING PROGRAM

## 2020-10-12 PROCEDURE — 3075F SYST BP GE 130 - 139MM HG: CPT | Mod: CPTII,GC,S$GLB, | Performed by: STUDENT IN AN ORGANIZED HEALTH CARE EDUCATION/TRAINING PROGRAM

## 2020-10-12 PROCEDURE — 3075F PR MOST RECENT SYSTOLIC BLOOD PRESS GE 130-139MM HG: ICD-10-PCS | Mod: CPTII,GC,S$GLB, | Performed by: STUDENT IN AN ORGANIZED HEALTH CARE EDUCATION/TRAINING PROGRAM

## 2020-10-12 PROCEDURE — 99999 PR PBB SHADOW E&M-EST. PATIENT-LVL III: CPT | Mod: PBBFAC,GC,, | Performed by: STUDENT IN AN ORGANIZED HEALTH CARE EDUCATION/TRAINING PROGRAM

## 2020-10-12 PROCEDURE — 1101F PT FALLS ASSESS-DOCD LE1/YR: CPT | Mod: CPTII,GC,S$GLB, | Performed by: STUDENT IN AN ORGANIZED HEALTH CARE EDUCATION/TRAINING PROGRAM

## 2020-10-12 PROCEDURE — 1159F PR MEDICATION LIST DOCUMENTED IN MEDICAL RECORD: ICD-10-PCS | Mod: GC,S$GLB,, | Performed by: STUDENT IN AN ORGANIZED HEALTH CARE EDUCATION/TRAINING PROGRAM

## 2020-10-12 RX ORDER — ZOSTER VACCINE RECOMBINANT, ADJUVANTED 50 MCG/0.5
0.5 KIT INTRAMUSCULAR
Qty: 2 EACH | Refills: 0 | Status: SHIPPED | OUTPATIENT
Start: 2020-10-12 | End: 2021-01-11

## 2020-10-12 RX ORDER — TOFACITINIB 11 MG/1
11 TABLET, FILM COATED, EXTENDED RELEASE ORAL DAILY
Qty: 90 TABLET | Refills: 0 | Status: SHIPPED | OUTPATIENT
Start: 2020-10-12 | End: 2021-10-25 | Stop reason: SDUPTHER

## 2020-10-12 NOTE — PROGRESS NOTES
"Subjective:       Patient ID: Bhavna Landry is a 65 y.o. female.    Chief Complaint: Osteoporosis    HPI     The patient is a 64 year old female with a PMH of guttate psoriasis, psoriatic arthritis, osteoporosis s/p zolendronic acid (9/24/20), OA s/p failed right TKA, lumbar spondylosis on diclofenac gel, Effexor, and gabapentin 400mg nightly, schizophrenia, and paroxysmal Afib who presents for follow up. She was last seen in June 2020. She is on secukinumab 300mg monthly and sulfasalazine 1,500 mg BID. She reports aching over bilateral CMC joints with some swelling. She also reports some discomfort and occasional swelling in her left knee. She denies stiffness, rashes, dactylitis, and red/painful eyes.     Treatment History:  Enbrel: Recurrent infections    Review of Systems   Constitutional: Negative for fever and unexpected weight change.   HENT: Negative for mouth sores and trouble swallowing.    Eyes: Negative for redness.   Respiratory: Negative for cough and shortness of breath.    Cardiovascular: Negative for chest pain.   Gastrointestinal: Negative for constipation and diarrhea.   Genitourinary: Negative for dysuria and genital sores.   Musculoskeletal: Positive for arthralgias and joint swelling.   Skin: Negative for rash.   Neurological: Negative for headaches.   Hematological: Does not bruise/bleed easily.         Objective:   /78   Pulse 76   Ht 5' 2.4" (1.585 m)   Wt 46.7 kg (103 lb)   BMI 18.60 kg/m²      Physical Exam   Constitutional: She is well-developed, well-nourished, and in no distress. No distress.   HENT:   Head: Normocephalic and atraumatic.   Mouth/Throat: No oropharyngeal exudate.   Eyes: Conjunctivae are normal. Right eye exhibits no discharge. Left eye exhibits no discharge. No scleral icterus.   Neck: Normal range of motion. Neck supple.   Cardiovascular: Normal rate, regular rhythm and normal heart sounds.    Pulmonary/Chest: Effort normal and breath sounds normal. "   Abdominal: Soft. Bowel sounds are normal.   Skin: Skin is warm and dry. No rash noted. She is not diaphoretic.     Musculoskeletal:      Comments: Mild bilateral 2nd and 3rd PIP, and 2nd-4th PIP swelling  Trace left elbow swelling  Trace left knee effusion           2/3/2020 10/12/2020   CLARK-28 (ESR) -- --   CLARK-28 (CRP) -- 2.61 (Low disease activity)   Tender (CLARK-28) 12 / 28  0 / 28    Swollen (CLARK-28) 4 / 28  10 / 28    Provider Global 20 mm 40 mm   Patient Global 65 mm 50 mm   ESR -- --   CRP -- 0.2 mg/L     CBC (9/17/2020): Hgb 11.5, Plt 375, otherwise WNL   Anemia panel: B12 and folate elevated, sat iron WNL   CMP WNL  ESR <2  CRP 0.2    Assessment:       1. Osteoporosis, unspecified osteoporosis type, unspecified pathological fracture presence    2. PSA (psoriatic arthritis)    3. Fibromyalgia    4. Encounter for monitoring sulfasalazine therapy    5. Other long term (current) drug therapy         The patient is a 64 year old female with a PMH of guttate psoriasis, psoriatic arthritis, osteoporosis s/p zolendronic acid (9/24/20), OA s/p failed right TKA, lumbar spondylosis on diclofenac gel, Effexor, and gabapentin 400mg nightly, schizophrenia, and paroxysmal Afib who presents for follow up. She was last seen in June 2020. She is on secukinumab 300mg monthly and sulfasalazine 1,500 mg BID. DAPSA is 23.02, CLARK-28 is moderate. Will plan to switch to Xeljanz if approved once her Cosentyx runs out.     Plan:       Problem List Items Addressed This Visit        Orthopedic    PSA (psoriatic arthritis)    Fibromyalgia      Other Visit Diagnoses     Osteoporosis, unspecified osteoporosis type, unspecified pathological fracture presence    -  Primary    Encounter for monitoring sulfasalazine therapy        Other long term (current) drug therapy             - Switch to Xeljanz once her Cosentyx runs out  - CBC, CMP, ESR, CRP, lipid panel, pre-DMARDs in 2 months  - Shingrix today  - Reclast given 9/24/2020  - DEXA  done 9/2020      Patient seen and discussed with Dr. Cornelius    RTANNAMARIE in 3 months    Yulisa Simental M.D.  Rheumatology, PGY 5

## 2020-10-13 ENCOUNTER — IMMUNIZATION (OUTPATIENT)
Dept: PHARMACY | Facility: CLINIC | Age: 65
End: 2020-10-13
Payer: MEDICARE

## 2020-10-14 ENCOUNTER — PROCEDURE VISIT (OUTPATIENT)
Dept: PAIN MEDICINE | Facility: CLINIC | Age: 65
End: 2020-10-14
Payer: MEDICARE

## 2020-10-14 ENCOUNTER — TELEPHONE (OUTPATIENT)
Dept: PHARMACY | Facility: CLINIC | Age: 65
End: 2020-10-14

## 2020-10-14 VITALS
WEIGHT: 102.94 LBS | DIASTOLIC BLOOD PRESSURE: 65 MMHG | SYSTOLIC BLOOD PRESSURE: 102 MMHG | HEART RATE: 82 BPM | BODY MASS INDEX: 18.59 KG/M2

## 2020-10-14 DIAGNOSIS — M70.62 GREATER TROCHANTERIC BURSITIS OF LEFT HIP: Primary | ICD-10-CM

## 2020-10-14 PROCEDURE — 76942 PR U/S GUIDANCE FOR NEEDLE GUIDANCE: ICD-10-PCS | Mod: S$GLB,,, | Performed by: PAIN MEDICINE

## 2020-10-14 PROCEDURE — 20611 DRAIN/INJ JOINT/BURSA W/US: CPT | Mod: LT,GC,S$GLB, | Performed by: STUDENT IN AN ORGANIZED HEALTH CARE EDUCATION/TRAINING PROGRAM

## 2020-10-14 PROCEDURE — 76942 ECHO GUIDE FOR BIOPSY: CPT | Mod: S$GLB,,, | Performed by: PAIN MEDICINE

## 2020-10-14 PROCEDURE — 99214 PR OFFICE/OUTPT VISIT, EST, LEVL IV, 30-39 MIN: ICD-10-PCS | Mod: S$GLB,,, | Performed by: PAIN MEDICINE

## 2020-10-14 PROCEDURE — 99214 OFFICE O/P EST MOD 30 MIN: CPT | Mod: S$GLB,,, | Performed by: PAIN MEDICINE

## 2020-10-14 PROCEDURE — 20611 LARGE JOINT ASPIRATION/INJECTION: L GREATER TROCHANTERIC BURSA: ICD-10-PCS | Mod: LT,GC,S$GLB, | Performed by: STUDENT IN AN ORGANIZED HEALTH CARE EDUCATION/TRAINING PROGRAM

## 2020-10-14 RX ORDER — TRIAMCINOLONE ACETONIDE 40 MG/ML
40 INJECTION, SUSPENSION INTRA-ARTICULAR; INTRAMUSCULAR
Status: DISCONTINUED | OUTPATIENT
Start: 2020-10-14 | End: 2020-10-14 | Stop reason: HOSPADM

## 2020-10-14 RX ADMIN — TRIAMCINOLONE ACETONIDE 40 MG: 40 INJECTION, SUSPENSION INTRA-ARTICULAR; INTRAMUSCULAR at 10:10

## 2020-10-14 NOTE — PROCEDURES
Large Joint Aspiration/Injection: L greater trochanteric bursa    Date/Time: 10/14/2020 10:30 AM  Performed by: Manjula Jensen MD  Authorized by: Michelle Pineda Jr., MD     Consent Done?:  Yes (Written)  Indications:  Arthritis and pain  Site marked: the procedure site was marked    Timeout: prior to procedure the correct patient, procedure, and site was verified    Prep: patient was prepped and draped in usual sterile fashion    Local anesthesia used?: No      Details:  Needle Size:  25 G  Ultrasonic Guidance for needle placement?: Yes    Images are saved and documented.  Approach:  Lateral  Location:  Hip  Site:  L greater trochanteric bursa  Medications:  40 mg triamcinolone acetonide 40 mg/mL  Patient tolerance:  Patient tolerated the procedure well with no immediate complications    Pt advised that she has reached her cumulative steroid dosing for the year.  Will will not be able to perform anymore steroid containing injections/procedures until Jan 2021.  She expressed understanding.  She was advised to work on her home stretching and home strengthening exercises as well.

## 2020-10-15 ENCOUNTER — CLINICAL SUPPORT (OUTPATIENT)
Dept: REHABILITATION | Facility: HOSPITAL | Age: 65
End: 2020-10-15
Payer: MEDICARE

## 2020-10-15 DIAGNOSIS — H83.2X2 VESTIBULAR DISEQUILIBRIUM, LEFT: ICD-10-CM

## 2020-10-15 DIAGNOSIS — R29.898 DECREASED ROM OF NECK: ICD-10-CM

## 2020-10-15 PROCEDURE — 97112 NEUROMUSCULAR REEDUCATION: CPT | Mod: PN,CQ

## 2020-10-15 NOTE — PROGRESS NOTES
"  Physical Therapy Treatment Note     Name: Bhavna Landry  Clinic Number: [id number]    Therapy Diagnosis:        Encounter Diagnoses   Name Primary?    Vestibular disequilibrium, left      Decreased ROM of neck          Physician: Karlee Tran PA-C    Visit Date: 10/15/2020    Physician Orders: Pt Eval and Treat  Medical Diagnosis: R42 (ICD-10-CM) - Vertigo  Evaluation Date: 09/15/2020  Authorization Period Expiration: 11/15/2020  Plan of Care Certification Period: 11/13/2020  Visit #/Visits authorized: 3/ 12     Time In: 11:00 AM  Time Out: 11:40 AM  Total Billable Time:  40 minutes (3 NMR)     Precautions: Fall and dizziness    Subjective     Pt reports: no dizziness currently. Doesn't recall the last time she was dizzy.   She was compliant with home exercise program.  Response to previous treatment: improvements in dizziness  Functional change: decreased frequency and intensity of dizziness,     Dizziness: 0/10     Objective     Jessica participated in neuromuscular re-education activities to improve: Balance, Coordination, Kinesthetic, Sense, Proprioception and Posture for 40 minutes. The following activities were included:  Standing in facing wall in private room: Post its used   -- VOR #1   2 x30" vertical, no visual change, no symptom elecitation               -- VOR #1                               2 x 30" horizontal, no visual change, no dizziness  elecitation              -- VOR #1                                2x30"  Diagonal, Left eye nystagmus, reports DECREASED dizziness    -- Smooth pursuits  2x30" all directions , Left eye nystagmus, reports no symptom elcitation              -- Saccades                            3x30" horizontal , Left eye nystagmus, no dizziness elicitation      -- standing on Airex, narrow VIKASH,  3x30" each  R<>L, up<>down  head turns (eyes open)  -- standing on Airex, narrow VIKASH      2x10" eyes closed CGA  -- Lareral stepping foam beam(2)  3 laps no UE support   -- Tandem " "walk on foam beam(2)   3 laps no UE support     -- Walking ball toss hand to hand     100 ft track ball with eyes horizontally  -- Walking  ball toss up/down         100 ft track ball with eyes vertically                        -- Tandem walking   3 laps in // constant UE handrail support  -- Tandem walking (semi)                  20 ft out of //bars no UE support Not performed today  -- Tandem static                                 3x20"  Min A/ CGA  -- High knee raise walking               100 ft w/3" hold CGA    -- Transitional changes Sit<>side ly<>sit x2 each  multi direactional                             Dian Halljaileneke:    - R: (-)   - L: (-)    Home Exercises Provided and Patient Education Provided     Education provided:   - Continue     Written Home Exercises Provided: Patient instructed to cont prior HEP.  Exercises were reviewed and Jessica was able to demonstrate them prior to the end of the session.  Jessica demonstrated good  understanding of the education provided.     See EMR under Patient Instructions for exercises provided prior visit.    Assessment   Jessica presents without complaint s/s of diizziness. Tolerated PT treatment well today. Good HEP compliance based on improved performance in clinic. However, continues to struggle with diagonal head movement for VOR.  Progressed with additional oculomotor and balance challenges. She was most challenged with tandem progression challenges. Added foam beam conditions    Jessica is progressing well towards her goals.    Pt prognosis is Good.     Pt will continue to benefit from skilled outpatient physical therapy to address the deficits listed in the problem list box on initial evaluation, provide pt/family education and to maximize pt's level of independence in the home and community environment.   Pt's spiritual, cultural and educational needs considered and pt agreeable to plan of care and goals.     Anticipated barriers to physical therapy: None    Goals: " Short Term Goals 4 weeks  1. Pt will be indep with HEP. (Progressing, Not MET)  2. Pt will be able to tolerate 15 minutes of vestibular adaptation exercises without increased dizziness. (MET, 9/22/2020)     Goals: Short Term Goals 8 weeks  1. Pt will score Pass on all components of the mCTSIB without verbalizing feeling shaking unsteady falling in eyes open/closed on foam. (Progressing, Not MET)  2. Pt will be 100% compliant with all gaze and occular HEPs issued. (Progressing, Not MET)  3. Pt will report 20% or less on the FOTO Vertigo Functional Assessment for decreased symptoms of dizziness. (Progressing, Not MET)   4. Pt will report 30 points or less on the FOTO DHI scale indicating increased confidence in balance and  mobility. (Progressing, Not MET)    6  Pt will be able to perform all of her usual home/work/shopping duties with minimal symptoms of lightheadedness or dizziness. (Progressing, Not MET)    Plan   Progress per RACHEL Haley, PTA

## 2020-10-17 ENCOUNTER — HOSPITAL ENCOUNTER (OUTPATIENT)
Dept: RADIOLOGY | Facility: HOSPITAL | Age: 65
Discharge: HOME | End: 2020-10-17
Attending: INTERNAL MEDICINE
Payer: MEDICARE

## 2020-10-17 DIAGNOSIS — R22.1 NECK MASS: ICD-10-CM

## 2020-10-17 PROCEDURE — 76536 US EXAM OF HEAD AND NECK: CPT | Mod: TC

## 2020-10-17 PROCEDURE — 76536 US EXAM OF HEAD AND NECK: CPT | Mod: 26,,, | Performed by: RADIOLOGY

## 2020-10-17 PROCEDURE — 76536 US SOFT TISSUE HEAD NECK THYROID: ICD-10-PCS | Mod: 26,,, | Performed by: RADIOLOGY

## 2020-10-19 ENCOUNTER — TELEPHONE (OUTPATIENT)
Dept: INTERNAL MEDICINE | Facility: CLINIC | Age: 65
End: 2020-10-19

## 2020-10-19 NOTE — TELEPHONE ENCOUNTER
Spoke with pt, pt stated that has a lump under her neck and she is losing weight. Pt stated that she went from 104 to 98 in 2 or 3 weeks.

## 2020-10-19 NOTE — TELEPHONE ENCOUNTER
----- Message from Saray Moya sent at 10/19/2020 11:27 AM CDT -----  Contact: Self   Pt is requesting a call regarding her thyroid and her weight. Please advise

## 2020-10-20 ENCOUNTER — CLINICAL SUPPORT (OUTPATIENT)
Dept: REHABILITATION | Facility: HOSPITAL | Age: 65
End: 2020-10-20
Payer: MEDICARE

## 2020-10-20 DIAGNOSIS — H83.2X2 VESTIBULAR DISEQUILIBRIUM, LEFT: ICD-10-CM

## 2020-10-20 DIAGNOSIS — G47.00 INSOMNIA, UNSPECIFIED TYPE: ICD-10-CM

## 2020-10-20 DIAGNOSIS — R29.898 DECREASED ROM OF NECK: ICD-10-CM

## 2020-10-20 PROCEDURE — 97112 NEUROMUSCULAR REEDUCATION: CPT | Mod: PN,CQ

## 2020-10-20 NOTE — PROGRESS NOTES
"  Physical Therapy Treatment Note     Name: Bhavna Landry  Clinic Number: [id number]    Therapy Diagnosis:        Encounter Diagnoses   Name Primary?    Vestibular disequilibrium, left      Decreased ROM of neck          Physician: Karlee Tran PA-C    Visit Date: 10/20/2020    Physician Orders: Pt Eval and Treat  Medical Diagnosis: R42 (ICD-10-CM) - Vertigo  Evaluation Date: 09/15/2020  Authorization Period Expiration: 11/15/2020  Plan of Care Certification Period: 11/13/2020  Visit #/Visits authorized: 4/ 12     Time In: 11:00 AM  Time Out: 11:40 AM  Total Billable Time:  40 minutes (3 NMR)     Precautions: Fall and dizziness    Subjective     Pt reports: no dizziness currently. Doesn't recall the last time she was dizzy. Stated her left hip bursitis is acting up right now. Will do her best. Has exercises she does at home to address bursitis.   She was compliant with home exercise program.  Response to previous treatment: improvements in dizziness  Functional change: decreased frequency and intensity of dizziness,     Dizziness: 0/10     Objective     Jessica participated in neuromuscular re-education activities to improve: Balance, Coordination, Kinesthetic, Sense, Proprioception and Posture for 40 minutes. The following activities were included:  Standing in facing wall in private room: Post its used   -- VOR #1   2 x30" vertical, no visual change, no dizziness elecitation               -- VOR #1                               2 x 30" horizontal, no visual change, no dizziness  elecitation              -- VOR #1                                2x30"  Diagonal, minimal  eye nystagmus, reports no dizziness    -- Smooth pursuits  2x30" all directions , minimal Left eye nystagmus, reports no dizziness elcitation              -- Saccades                            3x30" horizontal , minimal  Left eye nystagmus noted , no dizziness elicitation      -- standing on Airex, narrow VIKASH,  3x30" each  R<>L, up<>down  head " "turns (eyes open)  -- standing on Airex, narrow VIKASH      2x10" eyes closed CGA  -- Lareral stepping foam beam(2)  3 laps no UE support   -- Tandem walk on foam beam(2)   3 laps no UE support                        -- Tandem walking   3 laps in // constant UE handrail support  -- Tandem walking (semi)                  20 ft out of //bars no UE support (Attempted but patient then abandoned due to failed attempts)  -- Tandem static                                 3x20"  Min A/ CGA  -- High knee raise walking               150 ft w/3" hold CGA    -- Transitional changes Sit<>side ly<>sit x2 each  multi direactional (denied s/s elicitation)                             Dian Snell:    - R: (-)   - L: (-)    Home Exercises Provided and Patient Education Provided     Education provided:   - Continue     Written Home Exercises Provided: Patient instructed to cont prior HEP.  Exercises were reviewed and Jessica was able to demonstrate them prior to the end of the session.  Jessica demonstrated good  understanding of the education provided.     See EMR under Patient Instructions for exercises provided prior visit.    Assessment   Jessica presents without complaint s/s of dizziness this morning.  Good HEP compliance based on improved performance in clinic. Noted decreased left nystagmus during oculomotor exercises.   Improved  diagonal head movement for VOR.  Progressed with additional oculomotor and balance challenges. She was most challenged with tandem progression challenges. Jessica is progressing well towards her goals.    Pt prognosis is Good.     Pt will continue to benefit from skilled outpatient physical therapy to address the deficits listed in the problem list box on initial evaluation, provide pt/family education and to maximize pt's level of independence in the home and community environment.   Pt's spiritual, cultural and educational needs considered and pt agreeable to plan of care and goals.     Anticipated " barriers to physical therapy: None    Goals: Short Term Goals 4 weeks  1. Pt will be indep with HEP. (Progressing, Not MET)  2. Pt will be able to tolerate 15 minutes of vestibular adaptation exercises without increased dizziness. (MET, 9/22/2020)     Goals: Short Term Goals 8 weeks  1. Pt will score Pass on all components of the mCTSIB without verbalizing feeling shaking unsteady falling in eyes open/closed on foam. (Progressing, Not MET)  2. Pt will be 100% compliant with all gaze and occular HEPs issued. (Progressing, Not MET)  3. Pt will report 20% or less on the FOTO Vertigo Functional Assessment for decreased symptoms of dizziness. (Progressing, Not MET)   4. Pt will report 30 points or less on the FOTO DHI scale indicating increased confidence in balance and  mobility. (Progressing, Not MET)    6  Pt will be able to perform all of her usual home/work/shopping duties with minimal symptoms of lightheadedness or dizziness. (Progressing, Not MET)    Plan   Progress per RACHEL Haley, SCOTT

## 2020-10-20 NOTE — TELEPHONE ENCOUNTER
----- Message from Caro Dupont sent at 10/20/2020  8:20 AM CDT -----  Contact: self [phone]  Pt would like to set up appt this week to see dr mccormick per dr mccormick request. The only day open was Thursday at 1pm. Pt states she would prefer something around 8:30 on Thursday if possible. Please call and advise. Thank you

## 2020-10-21 ENCOUNTER — SPECIALTY PHARMACY (OUTPATIENT)
Dept: PHARMACY | Facility: CLINIC | Age: 65
End: 2020-10-21

## 2020-10-21 ENCOUNTER — NURSE TRIAGE (OUTPATIENT)
Dept: ADMINISTRATIVE | Facility: CLINIC | Age: 65
End: 2020-10-21

## 2020-10-21 ENCOUNTER — PATIENT OUTREACH (OUTPATIENT)
Dept: OTHER | Facility: OTHER | Age: 65
End: 2020-10-21

## 2020-10-21 DIAGNOSIS — L40.50 PSORIATIC ARTHRITIS: Primary | ICD-10-CM

## 2020-10-21 RX ORDER — TRAZODONE HYDROCHLORIDE 50 MG/1
50 TABLET ORAL NIGHTLY PRN
Qty: 90 TABLET | Refills: 3 | Status: SHIPPED | OUTPATIENT
Start: 2020-10-21 | End: 2022-04-25 | Stop reason: CLARIF

## 2020-10-21 RX ORDER — TRAMADOL HYDROCHLORIDE 50 MG/1
TABLET ORAL
Qty: 40 EACH | Refills: 0 | Status: SHIPPED | OUTPATIENT
Start: 2020-10-21 | End: 2020-11-19 | Stop reason: SDUPTHER

## 2020-10-21 NOTE — TELEPHONE ENCOUNTER
Pt states she took aleve for her bursitis per Dr Vargas's recommendations which brought her BP up to 163/111. She took PRN BP medication prescribed and laid down for 30 minutes. She wants a refill for tramadol. Pt declines triage. She states she is in the HTN monitoring program, and she already sent them the BP reading. They told her to go to ER or call MD if she has symptoms. No symptoms per pt. RN advised pt to call back/ go to ER with any symptoms such as CP, SOB, blurred vision, severe headache. She VU. Message will be sent to Dr Vargas; RN advised pt to call back if she has not heard anything by end of day.    Reason for Disposition   Caller requesting a NON-URGENT new prescription or refill and triager unable to refill per department policy    Protocols used: MEDICATION QUESTION CALL-A-[place]

## 2020-10-21 NOTE — TELEPHONE ENCOUNTER
----- Message from Caro Dupont sent at 10/21/2020  8:28 AM CDT -----  Contact: SELF [phone]  Requesting an RX refill or new RX.  Is this a refill or new RX: REFILL  RX name and strength:traMADoL (ULTRAM) 50 mg tablet  Is this a 30 day or 90 day RX:   Pharmacy name and phone # (copy/paste from chart):  [id number] - [place], [place] - [address] [facility] [address] [phone] (Phone)  [phone] (Fax)  Comments:   Pts states she has taken alive but it has made her BP go up. Sent pt to on call nurse

## 2020-10-21 NOTE — TELEPHONE ENCOUNTER
No new care gaps identified.  Powered by [employer]. Reference number: [id number]. 10/21/2020 9:41:19 AM   NICHOLAST

## 2020-10-21 NOTE — TELEPHONE ENCOUNTER
----- Message from Edgar Pack sent at 10/21/2020  1:51 PM CDT -----  Regarding: [phone]  Bhavna is requesting a callback as soon as possible.  She states that she needed traMADoL (ULTRAM) 50 mg tablet; not traZODone (DESYREL) 50 MG tablet.      Callback number: [phone]

## 2020-10-22 ENCOUNTER — CLINICAL SUPPORT (OUTPATIENT)
Dept: REHABILITATION | Facility: HOSPITAL | Age: 65
End: 2020-10-22
Payer: MEDICARE

## 2020-10-22 DIAGNOSIS — R29.898 DECREASED ROM OF NECK: ICD-10-CM

## 2020-10-22 DIAGNOSIS — H83.2X2 VESTIBULAR DISEQUILIBRIUM, LEFT: ICD-10-CM

## 2020-10-22 PROCEDURE — 97112 NEUROMUSCULAR REEDUCATION: CPT | Mod: PN,CQ

## 2020-10-22 NOTE — PROGRESS NOTES
"  Physical Therapy Treatment Note     Name: Bhavna Landry  Clinic Number: [id number]    Therapy Diagnosis:        Encounter Diagnoses   Name Primary?    Vestibular disequilibrium, left      Decreased ROM of neck          Physician: Karlee Tran PA-C    Visit Date: 10/22/2020    Physician Orders: Pt Eval and Treat  Medical Diagnosis: R42 (ICD-10-CM) - Vertigo  Evaluation Date: 09/15/2020  Authorization Period Expiration: 11/15/2020  Plan of Care Certification Period: 11/13/2020  Visit #/Visits authorized: 5/ 12  FOTO completed 10/22    Time In: 11:00 AM  Time Out: 11:40 AM  Total Billable Time:  40 minutes (3 NMR)     Precautions: Fall and dizziness    Subjective     Pt reports: no dizziness currently. Stated the last time she was dizzy was 2-3 weeks ago. Stated her left hip bursitis okay today as she took a pain pill before coming. Will do her best. Has exercises she does at home to address bursitis.   She was compliant with home exercise program.  Response to previous treatment: improvements in dizziness  Functional change: decreased frequency and intensity of dizziness,     Dizziness: 0/10     Objective     Jessica participated in neuromuscular re-education activities to improve: Balance, Coordination, Kinesthetic, Sense, Proprioception and Posture for 40 minutes. The following activities were included:  Standing in facing wall in private room: Post its used   -- VOR #1   2 x30" vertical, no visual change, no dizziness elecitation               -- VOR #1                               2 x 30" horizontal, no visual change, no dizziness  elecitation              -- VOR #1                                2x30"  Diagonal, minimal  eye nystagmus, reports no dizziness    -- Smooth pursuits  2x30" all directions, minimal Left eye nystagmus, reports no dizziness elcitation              -- Saccades                            3x30" horizontal , minimal Left eye nystagmus noted , no dizziness elicitation      -- standing " "on Airex, narrow VIKASH,  3x30" each  R<>L, up<>down  head turns (eyes open)  -- standing on Airex, narrow VIKASH      2x10" eyes closed CGA  -- Lareral stepping foam beam(2)      4 laps no UE support   -- Tandem walk on foam beam(2)   3 laps no UE support                        -- Tandem walking   3 laps in // constant UE handrail support  -- Tandem walking (semi)                  20 ft out of //bars no UE support (Attempted but patient then abandoned due to failed attempts)  -- Tandem static on foam beam         3x20"  Min A/ CGA  -- High knee raise walking               150 ft w/3" hold CGA  --Single leg stance Right on foam   3x30" (contaralteral foot on 6" step) Most challenging    --Single leg stance Left on foam    3x30" (contaralteral foot on 6" step) Less challenging   -- Transitional changes Sit<>side ly<>sit x2 each  multi direactional (denied s/s elicitation)  --                                        Stand<>1/2 kneeling<>tall kneeling<>quadruped<> side sit<> long sit<>supine 2 x Mod I (denied s/s elicitation)                             Dian Snell:    - R: (-)   - L: (-)    Home Exercises Provided and Patient Education Provided     Education provided:   - Continue     Written Home Exercises Provided: Patient instructed to cont prior HEP.  Exercises were reviewed and Jessica was able to demonstrate them prior to the end of the session.  Jessica demonstrated good  understanding of the education provided.     See EMR under Patient Instructions for exercises provided prior visit.    Assessment   Jessica again presents without complaint s/s of dizziness this morning.  Good HEP compliance based on improved performance in clinic. Noted decreased left nystagmus during oculomotor exercises.   Improved diagonal head movement for VOR but still needed occasional cues.  Progressed with additional oculomotor and balance challenges. Aso added floor transfer transitional movements with no s/s elicitation. Right single leg " stance on Airex foam more challenging compared to left.  Foto completed today. Continues to be  most challenged with tandem and single leg stance on foam progression challenges. Jessica is progressing well towards her goals. She may be close to DC    Pt prognosis is Good.     Pt will continue to benefit from skilled outpatient physical therapy to address the deficits listed in the problem list box on initial evaluation, provide pt/family education and to maximize pt's level of independence in the home and community environment.   Pt's spiritual, cultural and educational needs considered and pt agreeable to plan of care and goals.     Anticipated barriers to physical therapy: None    Goals: Short Term Goals 4 weeks  1. Pt will be indep with HEP. (Progressing, Not MET)  2. Pt will be able to tolerate 15 minutes of vestibular adaptation exercises without increased dizziness. (MET, 9/22/2020)     Goals: Short Term Goals 8 weeks  1. Pt will score Pass on all components of the mCTSIB without verbalizing feeling shaking unsteady falling in eyes open/closed on foam. (Progressing, Not MET)  2. Pt will be 100% compliant with all gaze and occular HEPs issued. (Progressing, Not MET)  3. Pt will report 20% or less on the FOTO Vertigo Functional Assessment for decreased symptoms of dizziness. (Progressing, Not MET)   4. Pt will report 30 points or less on the FOTO DHI scale indicating increased confidence in balance and  mobility. (Progressing, Not MET)    6  Pt will be able to perform all of her usual home/work/shopping duties with minimal symptoms of lightheadedness or dizziness. (Progressing, Not MET)    Plan   Progress per RACHEL Haley, PTA

## 2020-10-23 ENCOUNTER — SPECIALTY PHARMACY (OUTPATIENT)
Dept: PHARMACY | Facility: CLINIC | Age: 65
End: 2020-10-23

## 2020-10-27 ENCOUNTER — CLINICAL SUPPORT (OUTPATIENT)
Dept: REHABILITATION | Facility: HOSPITAL | Age: 65
End: 2020-10-27
Payer: MEDICARE

## 2020-10-27 DIAGNOSIS — R29.898 DECREASED ROM OF NECK: ICD-10-CM

## 2020-10-27 DIAGNOSIS — H83.2X2 VESTIBULAR DISEQUILIBRIUM, LEFT: ICD-10-CM

## 2020-10-27 PROCEDURE — 97112 NEUROMUSCULAR REEDUCATION: CPT | Mod: PN

## 2020-10-27 NOTE — PROGRESS NOTES
"  Physical Therapy Treatment Note/Discharge Summary     Name: Bhavna Landry  Clinic Number: [id number]    Therapy Diagnosis:        Encounter Diagnoses   Name Primary?    Vestibular disequilibrium, left      Decreased ROM of neck      Physician: Karlee Tran PA-C    Visit Date: 10/27/2020    Physician Orders: Pt Eval and Treat  Medical Diagnosis: R42 (ICD-10-CM) - Vertigo  Evaluation Date: 09/15/2020  Authorization Period Expiration: 11/15/2020  Plan of Care Certification Period: 11/13/2020  Visit #/Visits authorized: 7/ 12  FOTO completed 10/22    Time In: 11:30 AM  Time Out: 11:45 AM  Total Billable Time:  15 minutes (1 NMR)     Precautions: Fall and dizziness    Subjective     Pt reports: she is much better with no recent symptoms of   She was compliant with home exercise program.  Response to previous treatment: improvements in dizziness  Functional change: decreased frequency and intensity of dizziness,     Dizziness: 0/10     Objective      PT performed objective measures and pt completed FOTO assessment and DHI x 15' total.    MCT-SIB:  (P= Pass, F= Fail; note any sway; hold each position for 30")  Condition 1: (firm surface/feet together/eyes open) P  Condition 2: (firm surface/feet together/eyes closed) Fail  Condition 3: (soft surface/feet together/eyes open) P with sway  Condition 4: (soft surface/feet together/eyes closed) Fail but able to sustain 20 secs (10 secs longer than eval)      Home Exercises Provided and Patient Education Provided     Education provided:   - Continue     Written Home Exercises Provided: Patient instructed to cont prior HEP.  Exercises were reviewed and Jessica was able to demonstrate them prior to the end of the session.  Jessica demonstrated good  understanding of the education provided.     See EMR under Patient Instructions for exercises provided prior visit.    Assessment   Jessica again presents without complaint s/s of dizziness this morning.  Good HEP compliance based " on improved performance in clinic. She met all goals and had a complete resolution of dizziness with 0/100 on DHI and she is no longer limited in function by dizziness.        Pt prognosis is Good.     Pt will continue to benefit from skilled outpatient physical therapy to address the deficits listed in the problem list box on initial evaluation, provide pt/family education and to maximize pt's level of independence in the home and community environment.   Pt's spiritual, cultural and educational needs considered and pt agreeable to plan of care and goals.     Anticipated barriers to physical therapy: None    Goals: Short Term Goals 4 weeks  1. Pt will be indep with HEP. (Progressing, Not MET)  2. Pt will be able to tolerate 15 minutes of vestibular adaptation exercises without increased dizziness. (MET, 9/22/2020)     Goals: Short Term Goals 8 weeks  1. Pt will score Pass on all components of the mCTSIB without verbalizing feeling shaking unsteady falling in eyes open/closed on foam. (Progressing, Not MET) 10 sec increase with eyes closed, passed eyes open  2. Pt will be 100% compliant with all gaze and occular HEPs issued. MET  3. Pt will report 20% or less on the FOTO Vertigo Functional Assessment for decreased symptoms of dizziness. Met 3% limited  4. Pt will report 30 points or less on the FOTO DHI scale indicating increased confidence in balance and  mobility. MET 0/100 reported on DHI at d/c  6  Pt will be able to perform all of her usual home/work/shopping duties with minimal symptoms of lightheadedness or dizziness. MET    Plan   D/c today, recommend PT orders for hip bursitis    Anna Jeffries, PT

## 2020-10-28 NOTE — ASSESSMENT & PLAN NOTE
Bhavna Landry is a 63 y.o. female with a past psychiatric history of Paranoid Schizophrenia, who presented with AH.  Psychiatry was originally consulted to address the patient's symptoms of auditory hallucinations. Pt with long history of medication controlled paranoid schizophrenia, now due to inability to access previous med regimen, pt now presenting with paranoia and auditory hallucinations, in need of revamped med regimen in order to stabilize current psychotic symptomatology.     - Continue Effexor 150mg daily  - Started on Haldol, but changed to Risperdal 2mg qHS for psychotic features. Risperdal increased to 3mg qHS on 12/12.       PRN Medication(s):  - Haldol 5mg PO/IM q6hr PRN for non-redirectable agitation  - Ativan 2mg PO/IM q4hr PRN for non-redirectable agitation  - Benadryl 25mg TID PRN for breakthrough anxiety  - Benadryl 50mg qHS for insomnia, per patient request      [FreeTextEntry1] : 63 year old female well known to me for many years. She has a diagnosis of SLE, osteopenia and hand OA presents for routine management today. \par \par \par SLE-\par  she was diagnosed prior to seeing me, she has had minor symptoms mostly with positive serologies and negative APL antibodies. She has been doing well and her SLE has been quiescent for the past few years. She is now off PLQ for past year and doing well. \par Her review of systems is otherwise negative , recent labs her double-stranded DNA is negative\par To repeat labs\par \par Muscle spasm/ secondary FMS-\par \par To continue amitrytiline at bed time\par \par Osteopenia-\par she had osteoporosis in the past. She was on reclast and received it 2 years ago at a q 2 year dose. She is to use calcium and vitamin D at 1200 mg qd. FRAX score is low , will hold off on rx for now\par \par Hand OA-\par She has underlying OA and has had steroid injections in her 5th PIP in the past on the right hand. \par \par Cough-\par -Ct chest reviewed, ANCAs negative with allergy, will repeat CT chest in 6 months as it showed tree in bud appearance, but improved with abx and steroid course . \par \par \par She is aware to call if her symptoms worsen. f/u 4 months. \par

## 2020-10-29 NOTE — TELEPHONE ENCOUNTER
Provider portion of xeljanz zita sent to [employer] 10/26. Pt received/emailed back pt portion of application. Awaiting MD portion of application to send.

## 2020-11-04 ENCOUNTER — PATIENT OUTREACH (OUTPATIENT)
Dept: OTHER | Facility: OTHER | Age: 65
End: 2020-11-04
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
## 2020-11-05 ENCOUNTER — PATIENT MESSAGE (OUTPATIENT)
Dept: PSYCHIATRY | Facility: CLINIC | Age: 65
End: 2020-11-05

## 2020-11-05 NOTE — TELEPHONE ENCOUNTER
Submitted Xeljanz assistance application via fax to [employer] 10/29 @ 5:44pm - RAC    11/5 - Contacted [employer] to check status of patient's assistance application. Rep reports patient will need to call in to express hardship for the case to proceed to the HAP program. - RAC

## 2020-11-09 NOTE — TELEPHONE ENCOUNTER
EARLM for patient to notify her of Xeljanz assistance approval until 12/31/20. [facility] will resubmit application approx 12/15 for 2021.

## 2020-11-09 NOTE — TELEPHONE ENCOUNTER
FOR DOCUMENTATION ONLY:  Financial Assistance for Xeljanz approved from 11/6/20 to 12/31/20  Source: [employer]  Phone: [phone]  Fax: [phone]  [facility] will dispense medication directly to the patient.

## 2020-11-12 ENCOUNTER — LAB VISIT (OUTPATIENT)
Dept: LAB | Facility: HOSPITAL | Age: 65
End: 2020-11-12
Attending: STUDENT IN AN ORGANIZED HEALTH CARE EDUCATION/TRAINING PROGRAM
Payer: MEDICARE

## 2020-11-12 DIAGNOSIS — M81.0 OSTEOPOROSIS, UNSPECIFIED OSTEOPOROSIS TYPE, UNSPECIFIED PATHOLOGICAL FRACTURE PRESENCE: ICD-10-CM

## 2020-11-12 DIAGNOSIS — L40.50 PSA (PSORIATIC ARTHRITIS): ICD-10-CM

## 2020-11-12 DIAGNOSIS — M79.7 FIBROMYALGIA: ICD-10-CM

## 2020-11-12 DIAGNOSIS — Z51.81 ENCOUNTER FOR MONITORING SULFASALAZINE THERAPY: ICD-10-CM

## 2020-11-12 DIAGNOSIS — Z79.899 ENCOUNTER FOR MONITORING SULFASALAZINE THERAPY: ICD-10-CM

## 2020-11-12 DIAGNOSIS — Z79.899 OTHER LONG TERM (CURRENT) DRUG THERAPY: ICD-10-CM

## 2020-11-12 LAB
ALBUMIN SERPL BCP-MCNC: 4 G/DL (ref 3.5–5.2)
ALP SERPL-CCNC: 62 U/L (ref 55–135)
ALT SERPL W/O P-5'-P-CCNC: 18 U/L (ref 10–44)
ANION GAP SERPL CALC-SCNC: 9 MMOL/L (ref 8–16)
AST SERPL-CCNC: 24 U/L (ref 10–40)
BASOPHILS # BLD AUTO: 0.09 K/UL (ref 0–0.2)
BASOPHILS NFR BLD: 1.2 % (ref 0–1.9)
BILIRUB SERPL-MCNC: 0.4 MG/DL (ref 0.1–1)
BUN SERPL-MCNC: 14 MG/DL (ref 8–23)
CALCIUM SERPL-MCNC: 8.7 MG/DL (ref 8.7–10.5)
CHLORIDE SERPL-SCNC: 102 MMOL/L (ref 95–110)
CHOLEST SERPL-MCNC: 167 MG/DL (ref 120–199)
CHOLEST/HDLC SERPL: 1.7 {RATIO} (ref 2–5)
CO2 SERPL-SCNC: 25 MMOL/L (ref 23–29)
CREAT SERPL-MCNC: 1.4 MG/DL (ref 0.5–1.4)
CRP SERPL-MCNC: 0.1 MG/L (ref 0–8.2)
DIFFERENTIAL METHOD: ABNORMAL
EOSINOPHIL # BLD AUTO: 0.2 K/UL (ref 0–0.5)
EOSINOPHIL NFR BLD: 2.5 % (ref 0–8)
ERYTHROCYTE [DISTWIDTH] IN BLOOD BY AUTOMATED COUNT: 13.5 % (ref 11.5–14.5)
ERYTHROCYTE [SEDIMENTATION RATE] IN BLOOD BY WESTERGREN METHOD: <2 MM/HR (ref 0–36)
EST. GFR  (AFRICAN AMERICAN): 44.9 ML/MIN/1.73 M^2
EST. GFR  (NON AFRICAN AMERICAN): 38.9 ML/MIN/1.73 M^2
GLUCOSE SERPL-MCNC: 112 MG/DL (ref 70–110)
HCT VFR BLD AUTO: 40.7 % (ref 37–48.5)
HDLC SERPL-MCNC: 101 MG/DL (ref 40–75)
HDLC SERPL: 60.5 % (ref 20–50)
HGB BLD-MCNC: 12.9 G/DL (ref 12–16)
IMM GRANULOCYTES # BLD AUTO: 0.02 K/UL (ref 0–0.04)
IMM GRANULOCYTES NFR BLD AUTO: 0.3 % (ref 0–0.5)
LDLC SERPL CALC-MCNC: 51.8 MG/DL (ref 63–159)
LYMPHOCYTES # BLD AUTO: 2.2 K/UL (ref 1–4.8)
LYMPHOCYTES NFR BLD: 29.2 % (ref 18–48)
MCH RBC QN AUTO: 33.2 PG (ref 27–31)
MCHC RBC AUTO-ENTMCNC: 31.7 G/DL (ref 32–36)
MCV RBC AUTO: 105 FL (ref 82–98)
MONOCYTES # BLD AUTO: 0.5 K/UL (ref 0.3–1)
MONOCYTES NFR BLD: 6.9 % (ref 4–15)
NEUTROPHILS # BLD AUTO: 4.6 K/UL (ref 1.8–7.7)
NEUTROPHILS NFR BLD: 59.9 % (ref 38–73)
NONHDLC SERPL-MCNC: 66 MG/DL
NRBC BLD-RTO: 0 /100 WBC
PLATELET # BLD AUTO: 359 K/UL (ref 150–350)
PMV BLD AUTO: 8.9 FL (ref 9.2–12.9)
POTASSIUM SERPL-SCNC: 4.5 MMOL/L (ref 3.5–5.1)
PROT SERPL-MCNC: 6.2 G/DL (ref 6–8.4)
RBC # BLD AUTO: 3.89 M/UL (ref 4–5.4)
SODIUM SERPL-SCNC: 136 MMOL/L (ref 136–145)
TRIGL SERPL-MCNC: 71 MG/DL (ref 30–150)
WBC # BLD AUTO: 7.66 K/UL (ref 3.9–12.7)

## 2020-11-12 PROCEDURE — 86703 HIV-1/HIV-2 1 RESULT ANTBDY: CPT

## 2020-11-12 PROCEDURE — 80053 COMPREHEN METABOLIC PANEL: CPT

## 2020-11-12 PROCEDURE — 85652 RBC SED RATE AUTOMATED: CPT

## 2020-11-12 PROCEDURE — 86706 HEP B SURFACE ANTIBODY: CPT

## 2020-11-12 PROCEDURE — 85025 COMPLETE CBC W/AUTO DIFF WBC: CPT

## 2020-11-12 PROCEDURE — 86704 HEP B CORE ANTIBODY TOTAL: CPT

## 2020-11-12 PROCEDURE — 86787 VARICELLA-ZOSTER ANTIBODY: CPT

## 2020-11-12 PROCEDURE — 86803 HEPATITIS C AB TEST: CPT

## 2020-11-12 PROCEDURE — 86790 VIRUS ANTIBODY NOS: CPT

## 2020-11-12 PROCEDURE — 36415 COLL VENOUS BLD VENIPUNCTURE: CPT | Mod: PO

## 2020-11-12 PROCEDURE — 86592 SYPHILIS TEST NON-TREP QUAL: CPT

## 2020-11-12 PROCEDURE — 86140 C-REACTIVE PROTEIN: CPT

## 2020-11-12 PROCEDURE — 80061 LIPID PANEL: CPT

## 2020-11-12 PROCEDURE — 87340 HEPATITIS B SURFACE AG IA: CPT

## 2020-11-12 PROCEDURE — 86682 HELMINTH ANTIBODY: CPT

## 2020-11-13 ENCOUNTER — TELEPHONE (OUTPATIENT)
Dept: RHEUMATOLOGY | Facility: CLINIC | Age: 65
End: 2020-11-13

## 2020-11-13 ENCOUNTER — TELEPHONE (OUTPATIENT)
Dept: INTERNAL MEDICINE | Facility: CLINIC | Age: 65
End: 2020-11-13

## 2020-11-13 DIAGNOSIS — R79.89 ELEVATED SERUM CREATININE: Primary | ICD-10-CM

## 2020-11-13 LAB
HBV CORE AB SERPL QL IA: NEGATIVE
HBV SURFACE AB SER-ACNC: NEGATIVE M[IU]/ML
HBV SURFACE AG SERPL QL IA: NEGATIVE
HCV AB SERPL QL IA: NEGATIVE
HEPATITIS A ANTIBODY, IGG: NEGATIVE
HIV 1+2 AB+HIV1 P24 AG SERPL QL IA: NEGATIVE
RPR SER QL: NORMAL

## 2020-11-13 NOTE — TELEPHONE ENCOUNTER
----- Message from Tamera Jaramillo sent at 11/13/2020  3:45 PM CST -----  Contact: self [phone]  Patient's rheumatology's run blood test and her Kidneys results are elevated, she was told by Dr West to call her pcp.    Thank you

## 2020-11-13 NOTE — TELEPHONE ENCOUNTER
I spoke with Ms. Landry she has had an increase in creatinine from 0.8 to 1.4 (GFR >60 to GFR 38.9). She just recently started taking Xeljanz and states she has been taking NSAID's twice daily. I advised her to hold NSAIDs for now and to contact her PCP about creatinine increase. Will recheck kidney function in 1 week.

## 2020-11-16 ENCOUNTER — TELEPHONE (OUTPATIENT)
Dept: RHEUMATOLOGY | Facility: CLINIC | Age: 65
End: 2020-11-16

## 2020-11-16 DIAGNOSIS — N28.9 RENAL INSUFFICIENCY: Primary | ICD-10-CM

## 2020-11-16 DIAGNOSIS — R79.89 ELEVATED SERUM CREATININE: Primary | ICD-10-CM

## 2020-11-16 LAB
STRONGYLOIDES ANTIBODY IGG: NEGATIVE
VARICELLA INTERPRETATION: POSITIVE
VARICELLA ZOSTER IGG: 5.06 ISR (ref 0–0.9)

## 2020-11-19 RX ORDER — TRAMADOL HYDROCHLORIDE 50 MG/1
TABLET ORAL
Qty: 40 EACH | Refills: 0 | Status: ON HOLD | OUTPATIENT
Start: 2020-11-19 | End: 2021-03-31 | Stop reason: HOSPADM

## 2020-11-19 NOTE — TELEPHONE ENCOUNTER
----- Message from Angela Fields sent at 11/19/2020  4:10 PM CST -----  Contact: Bhavna [phone]  Requesting an RX refill or new RX.    Is this a refill or new RX: Refill    RX name and strength: traMADoL (ULTRAM) 50 mg tablet    Pharmacy name and phone # (copy/paste from chart):  [id number] - [place], [place] - [address] AT [facility] [address]    Comments:

## 2020-11-20 ENCOUNTER — LAB VISIT (OUTPATIENT)
Dept: LAB | Facility: HOSPITAL | Age: 65
End: 2020-11-20
Attending: STUDENT IN AN ORGANIZED HEALTH CARE EDUCATION/TRAINING PROGRAM
Payer: MEDICARE

## 2020-11-20 DIAGNOSIS — R79.89 ELEVATED SERUM CREATININE: ICD-10-CM

## 2020-11-20 LAB
BILIRUB UR QL STRIP: NEGATIVE
CLARITY UR: CLEAR
COLOR UR: YELLOW
GLUCOSE UR QL STRIP: NEGATIVE
HGB UR QL STRIP: NEGATIVE
KETONES UR QL STRIP: NEGATIVE
LEUKOCYTE ESTERASE UR QL STRIP: NEGATIVE
NITRITE UR QL STRIP: NEGATIVE
PH UR STRIP: 6 [PH] (ref 5–8)
PROT UR QL STRIP: NEGATIVE
SP GR UR STRIP: 1 (ref 1–1.03)
URN SPEC COLLECT METH UR: NORMAL
UROBILINOGEN UR STRIP-ACNC: NEGATIVE EU/DL

## 2020-11-20 PROCEDURE — 81002 URINALYSIS NONAUTO W/O SCOPE: CPT | Mod: PO

## 2020-11-21 ENCOUNTER — TELEPHONE (OUTPATIENT)
Dept: RHEUMATOLOGY | Facility: CLINIC | Age: 65
End: 2020-11-21

## 2020-11-21 NOTE — TELEPHONE ENCOUNTER
Yulisa, note the sodium of 128 down from 136 9 days ago. Would call pt and see if any symptoms. If not, she should restrict fluids to 1 liter/day, and may need to review her psychiatric meds rajiv venlafaxine and trazodone as contributory and also lisinopril. She should also contact Dr. Vargas her primary and arrange f/u lytes etc. Thank you. YEN

## 2020-11-23 ENCOUNTER — TELEPHONE (OUTPATIENT)
Dept: PSYCHIATRY | Facility: CLINIC | Age: 65
End: 2020-11-23

## 2020-11-23 ENCOUNTER — TELEPHONE (OUTPATIENT)
Dept: RHEUMATOLOGY | Facility: HOSPITAL | Age: 65
End: 2020-11-23

## 2020-11-23 ENCOUNTER — TELEPHONE (OUTPATIENT)
Dept: INTERNAL MEDICINE | Facility: CLINIC | Age: 65
End: 2020-11-23

## 2020-11-23 DIAGNOSIS — E87.1 HYPONATREMIA: Primary | ICD-10-CM

## 2020-11-23 DIAGNOSIS — L40.50 PSORIATIC ARTHRITIS: ICD-10-CM

## 2020-11-23 DIAGNOSIS — F33.42 RECURRENT MAJOR DEPRESSIVE DISORDER, IN FULL REMISSION: ICD-10-CM

## 2020-11-23 RX ORDER — VENLAFAXINE HYDROCHLORIDE 75 MG/1
75 CAPSULE, EXTENDED RELEASE ORAL DAILY
Qty: 90 CAPSULE | Refills: 1 | Status: SHIPPED | OUTPATIENT
Start: 2020-11-23 | End: 2021-03-02

## 2020-11-23 RX ORDER — SULFASALAZINE 500 MG/1
1500 TABLET, DELAYED RELEASE ORAL 2 TIMES DAILY
Qty: 540 TABLET | Refills: 0 | Status: SHIPPED | OUTPATIENT
Start: 2020-11-23 | End: 2021-05-12 | Stop reason: SDUPTHER

## 2020-11-23 NOTE — TELEPHONE ENCOUNTER
Also See message below. Pt called wanting to report low sodium. Per separate encounter pt was advise to speak with her psychiatrist regarding venlafaxine being the cause of low sodium. She was also advised to contact PCP

## 2020-11-23 NOTE — TELEPHONE ENCOUNTER
I spoke with Ms. Landry regarding her low sodium. She is completely asymptomatic at the moment. Discussed that she will need to limit her fluid intake to 1L per day for now and that she will need to contact her primary care doctor. She will also need to discuss her medications with her psychiatrist as venlaxafine can cause hyponatremia.

## 2020-11-23 NOTE — TELEPHONE ENCOUNTER
Spoke to patient regarding recent hyponatremia.  She is aware Effexor could be contributing to this.  She is currently drinking 2L of water per day, and PCP has instructed her to decrease to 1L per day.  Patient would like to first decrease Effexor to 75mg daily and then recheck Na in 1 week.  She is hesitant to stop since it has been so helpful for her.  She is currently not having symptoms of hyponatremia but agrees to contact PCP if this should occur.

## 2020-11-23 NOTE — TELEPHONE ENCOUNTER
----- Message from Jess Rodríguez sent at 11/23/2020  8:42 AM CST -----  Regarding: Medical advise  Contact: [phone] @ Patient  Good Morning  Patient went to see Dr Cornelius and her sodium is lower about 9 to 11 points within 9 days . Patient said Dr Cornelius feel it is the BP Rx. Patient is always tired. Patient had labs 11/20/2020 . Patient was informed to drink a 4 cups of hummel a day.      Please call and advise

## 2020-11-23 NOTE — TELEPHONE ENCOUNTER
----- Message from Donald Beasley MA sent at 11/23/2020 12:40 PM CST -----  [phone]    Pt is requesting a call back. She stated she spoke with her rheumatologist and he stated the venlafaxine (EFFEXOR-XR) 150 MG Cp24 might be the cause of her sodium being low.

## 2020-12-01 ENCOUNTER — LAB VISIT (OUTPATIENT)
Dept: INTERNAL MEDICINE | Facility: CLINIC | Age: 65
End: 2020-12-01
Payer: MEDICARE

## 2020-12-01 ENCOUNTER — LAB VISIT (OUTPATIENT)
Dept: LAB | Facility: HOSPITAL | Age: 65
End: 2020-12-01
Attending: INTERNAL MEDICINE
Payer: MEDICARE

## 2020-12-01 ENCOUNTER — OFFICE VISIT (OUTPATIENT)
Dept: INTERNAL MEDICINE | Facility: CLINIC | Age: 65
End: 2020-12-01
Payer: MEDICARE

## 2020-12-01 VITALS
HEART RATE: 83 BPM | BODY MASS INDEX: 18.41 KG/M2 | SYSTOLIC BLOOD PRESSURE: 110 MMHG | DIASTOLIC BLOOD PRESSURE: 62 MMHG | TEMPERATURE: 98 F | OXYGEN SATURATION: 97 % | HEIGHT: 62 IN | WEIGHT: 100.06 LBS

## 2020-12-01 DIAGNOSIS — R05.9 COUGH: ICD-10-CM

## 2020-12-01 DIAGNOSIS — E87.1 HYPONATREMIA: ICD-10-CM

## 2020-12-01 DIAGNOSIS — J06.9 UPPER RESPIRATORY TRACT INFECTION, UNSPECIFIED TYPE: ICD-10-CM

## 2020-12-01 LAB
ANION GAP SERPL CALC-SCNC: 6 MMOL/L (ref 8–16)
BUN SERPL-MCNC: 8 MG/DL (ref 8–23)
CALCIUM SERPL-MCNC: 9.2 MG/DL (ref 8.7–10.5)
CHLORIDE SERPL-SCNC: 101 MMOL/L (ref 95–110)
CO2 SERPL-SCNC: 28 MMOL/L (ref 23–29)
CREAT SERPL-MCNC: 0.8 MG/DL (ref 0.5–1.4)
EST. GFR  (AFRICAN AMERICAN): >60 ML/MIN/1.73 M^2
EST. GFR  (NON AFRICAN AMERICAN): >60 ML/MIN/1.73 M^2
GLUCOSE SERPL-MCNC: 93 MG/DL (ref 70–110)
POTASSIUM SERPL-SCNC: 4.9 MMOL/L (ref 3.5–5.1)
SODIUM SERPL-SCNC: 135 MMOL/L (ref 136–145)

## 2020-12-01 PROCEDURE — 1159F PR MEDICATION LIST DOCUMENTED IN MEDICAL RECORD: ICD-10-PCS | Mod: S$GLB,,, | Performed by: PHYSICIAN ASSISTANT

## 2020-12-01 PROCEDURE — 3078F DIAST BP <80 MM HG: CPT | Mod: CPTII,S$GLB,, | Performed by: PHYSICIAN ASSISTANT

## 2020-12-01 PROCEDURE — 1125F PR PAIN SEVERITY QUANTIFIED, PAIN PRESENT: ICD-10-PCS | Mod: S$GLB,,, | Performed by: PHYSICIAN ASSISTANT

## 2020-12-01 PROCEDURE — 80048 BASIC METABOLIC PNL TOTAL CA: CPT

## 2020-12-01 PROCEDURE — 3074F PR MOST RECENT SYSTOLIC BLOOD PRESSURE < 130 MM HG: ICD-10-PCS | Mod: CPTII,S$GLB,, | Performed by: PHYSICIAN ASSISTANT

## 2020-12-01 PROCEDURE — 1101F PR PT FALLS ASSESS DOC 0-1 FALLS W/OUT INJ PAST YR: ICD-10-PCS | Mod: CPTII,S$GLB,, | Performed by: PHYSICIAN ASSISTANT

## 2020-12-01 PROCEDURE — 3008F PR BODY MASS INDEX (BMI) DOCUMENTED: ICD-10-PCS | Mod: CPTII,S$GLB,, | Performed by: PHYSICIAN ASSISTANT

## 2020-12-01 PROCEDURE — 3078F PR MOST RECENT DIASTOLIC BLOOD PRESSURE < 80 MM HG: ICD-10-PCS | Mod: CPTII,S$GLB,, | Performed by: PHYSICIAN ASSISTANT

## 2020-12-01 PROCEDURE — 3008F BODY MASS INDEX DOCD: CPT | Mod: CPTII,S$GLB,, | Performed by: PHYSICIAN ASSISTANT

## 2020-12-01 PROCEDURE — 99999 PR PBB SHADOW E&M-EST. PATIENT-LVL V: CPT | Mod: PBBFAC,,, | Performed by: PHYSICIAN ASSISTANT

## 2020-12-01 PROCEDURE — 99213 PR OFFICE/OUTPT VISIT, EST, LEVL III, 20-29 MIN: ICD-10-PCS | Mod: S$GLB,,, | Performed by: PHYSICIAN ASSISTANT

## 2020-12-01 PROCEDURE — 1101F PT FALLS ASSESS-DOCD LE1/YR: CPT | Mod: CPTII,S$GLB,, | Performed by: PHYSICIAN ASSISTANT

## 2020-12-01 PROCEDURE — 1159F MED LIST DOCD IN RCRD: CPT | Mod: S$GLB,,, | Performed by: PHYSICIAN ASSISTANT

## 2020-12-01 PROCEDURE — 3288F PR FALLS RISK ASSESSMENT DOCUMENTED: ICD-10-PCS | Mod: CPTII,S$GLB,, | Performed by: PHYSICIAN ASSISTANT

## 2020-12-01 PROCEDURE — 1125F AMNT PAIN NOTED PAIN PRSNT: CPT | Mod: S$GLB,,, | Performed by: PHYSICIAN ASSISTANT

## 2020-12-01 PROCEDURE — 99999 PR PBB SHADOW E&M-EST. PATIENT-LVL V: ICD-10-PCS | Mod: PBBFAC,,, | Performed by: PHYSICIAN ASSISTANT

## 2020-12-01 PROCEDURE — U0003 INFECTIOUS AGENT DETECTION BY NUCLEIC ACID (DNA OR RNA); SEVERE ACUTE RESPIRATORY SYNDROME CORONAVIRUS 2 (SARS-COV-2) (CORONAVIRUS DISEASE [COVID-19]), AMPLIFIED PROBE TECHNIQUE, MAKING USE OF HIGH THROUGHPUT TECHNOLOGIES AS DESCRIBED BY CMS-2020-01-R: HCPCS

## 2020-12-01 PROCEDURE — 3288F FALL RISK ASSESSMENT DOCD: CPT | Mod: CPTII,S$GLB,, | Performed by: PHYSICIAN ASSISTANT

## 2020-12-01 PROCEDURE — 36415 COLL VENOUS BLD VENIPUNCTURE: CPT

## 2020-12-01 PROCEDURE — 99213 OFFICE O/P EST LOW 20 MIN: CPT | Mod: S$GLB,,, | Performed by: PHYSICIAN ASSISTANT

## 2020-12-01 PROCEDURE — 3074F SYST BP LT 130 MM HG: CPT | Mod: CPTII,S$GLB,, | Performed by: PHYSICIAN ASSISTANT

## 2020-12-01 RX ORDER — FLUTICASONE PROPIONATE 50 MCG
SPRAY, SUSPENSION (ML) NASAL
Qty: 48 G | Refills: 0 | Status: SHIPPED | OUTPATIENT
Start: 2020-12-01 | End: 2021-02-24 | Stop reason: SDUPTHER

## 2020-12-01 RX ORDER — FLUTICASONE PROPIONATE 50 MCG
1 SPRAY, SUSPENSION (ML) NASAL DAILY
Qty: 18.2 ML | Refills: 0 | Status: SHIPPED | OUTPATIENT
Start: 2020-12-01 | End: 2020-12-01

## 2020-12-01 NOTE — PATIENT INSTRUCTIONS
Instructions for Patients with Confirmed or Suspected COVID-19    If you are awaiting your test result, you will either be called or it will be released to the patient portal.  If you have any questions about your test, please visit [email or web address] or call our COVID-19 information line at [phone].      Instructions for non-hospitalized or discharged patients with confirmed or suspected COVID-19:       Stay home except to get medical care.    Separate yourself from other people and animals in your home.    Call ahead before visiting your doctor.    Wear a face mask.    Cover your coughs and sneezes.    Clean your hands often.    Avoid sharing personal household items.    Clean all high-touch surfaces every day.    Monitor your symptoms. Seek prompt medical attention if your illness is worsening (e.g., difficulty breathing). Before seeking care, call your healthcare provider.    If you have a medical emergency and must call 911, notify the dispatcher that you have or are being evaluated for COVID-19. If possible, put on a face mask before emergency medical services arrive.    Use the following symptom-based strategy to return to normal activity following a suspected or confirmed case of COVID-19. Continue isolation until:   o At least 3 days (72 hours) have passed since recovery defined as resolution of fever without the use of fever-reducing medications and improvement in respiratory symptoms (e.g. cough, shortness of breath), and   o At least 10 days have passed since the first positive test.       As one of the next steps, you will receive a call or text from the [place] Department of Health ([facility]) COVID-19 Tracing Team. See the contact information below so you know not to ignore the health departments call. It is important that you contact them back immediately so they can help.     Contact Tracer Number:  [phone]  Caller ID for most carriers: [place] Dept [facility]    What is  contact tracing?   Contact tracing is a process that helps identify everyone who has been in close contact with an infected person. Contact tracers let those people know they may have been exposed and guide them on next steps. Confidentiality is important for everyone; no one will be told who may have exposed them to the virus.   Your involvement is important. The more we know about where and how this virus is spreading, the better chance we have at stopping it from spreading further.  What does exposure mean?   Exposure means you have been within 6 feet for more than 15 minutes with a person who has or had COVID-19.  What kind of questions do the contact tracers ask?   A contact tracer will confirm your basic contact information including name, address, phone number, and next of kin, as well as asking about any symptoms you may have had. Theyll also ask you how you think you may have gotten sick, such as places where you may have been exposed to the virus, and people you were with. Those names will never be shared with anyone outside of that call, and will only be used to help trace and stop the spread of the virus.   I have privacy concerns. How will the state use my information?   Your privacy about your health is important. All calls are completed using call centers that use the appropriate health privacy protection measures (HIPAA compliance), meaning that your patient information is safe. No one will ever ask you any questions related to immigration status. Your health comes first.   Do I have to participate?   You do not have to participate, but we strongly encourage you to. Contact tracing can help us catch and control new outbreaks as theyre developing to keep your friends and family safe.   What if I dont hear from anyone?   If you dont receive a call within 24 hours, you can call the number above right away to inquire about your status. That line is open from 8:00 am - 8:00 p.m., 7 days a  week.  Contact tracing saves lives! Together, we have the power to beat this virus and keep our loved ones and neighbors safe.       Instructions for household members, intimate partners and caregivers in a non-healthcare setting of a patient with confirmed or suspected COVID-19:         Close contacts should monitor their health and call their healthcare provider right away if they develop symptoms suggestive of COVID-19 (e.g., fever, cough, shortness of breath).    Stay home except to get medical care. Separate yourself from other people and animals in the home.   Monitor the patients symptoms. If the patient is getting sicker, call his or her healthcare provider. If the patient has a medical emergency and you need to call 911, notify the dispatch personnel that the patient has or is being evaluated for COVID-19.    Wear a facemask when around other people such as sharing a room or vehicle and before entering a healthcare provider's office.   Cover coughs and sneezes with a tissue. Throw used tissues in a lined trash can immediately and wash hands.   Clean hands often with soap and water for at least 20 seconds or with an alcohol-based hand , rubbing hands together until they feel dry. Avoid touching your eyes, nose, and mouth with unwashed hands.   Clean all high-touch; surfaces every day, including counters, tabletops, doorknobs, bathroom fixtures, toilets, phones, keyboards, tablets, bedside tables, etc. Use a household cleaning spray or wipe according to label instructions.   Avoid sharing personal household items such as dishes, drinking glasses, cups, towels, bedding, etc. After these items are used, they should be washed thoroughly with soap and water.   Continue isolation until:   At least 3 days (72 hours) have passed since recovery defined as resolution of fever without the use of fever-reducing medications and improvement in respiratory symptoms (e.g. cough, shortness of breath),  and    At least 10 days have passed since the patients first positive test.    https://www.cdc.gov/coronavirus/2019-ncov/your-health/index.htm

## 2020-12-01 NOTE — PROGRESS NOTES
Subjective:       Patient ID: Bhavna Landry is a 65 y.o. female.        Chief Complaint: Follow-up    Bhavna Landry is an established patient of Sadaf Vargas MD here today for urgent care visit.    Sodium level was low in labs drawn for rheum, scheduled for repeat BMP today, venlafaxine dec from 150 to 75 mg daily by Dr. Poe in case contributing to hyponatremia, she has also fluid restricted to 1L/day    Also with 10 days of sx with sinus drip, right ear pain, some coughing, right ear pain, feels glands swollen right side of neck.  No shortness of breath, chest tightness, or fever.  No N/V/D/C.           Review of Systems   Constitutional: Negative for chills, diaphoresis, fatigue and fever.   HENT: Positive for congestion, ear pain and postnasal drip. Negative for sore throat.    Eyes: Negative for visual disturbance.   Respiratory: Positive for cough. Negative for chest tightness and shortness of breath.    Cardiovascular: Negative for chest pain, palpitations and leg swelling.   Gastrointestinal: Negative for abdominal pain, blood in stool, constipation, diarrhea, nausea and vomiting.   Genitourinary: Negative for dysuria, frequency, hematuria and urgency.   Musculoskeletal: Negative for arthralgias and back pain.   Skin: Negative for rash.   Neurological: Negative for dizziness, syncope, weakness and headaches.   Psychiatric/Behavioral: Negative for dysphoric mood and sleep disturbance. The patient is not nervous/anxious.        Objective:      Physical Exam  Vitals signs and nursing note reviewed.   Constitutional:       Appearance: Normal appearance. She is well-developed.   HENT:      Head: Normocephalic.      Right Ear: External ear normal. Tympanic membrane is not erythematous.      Left Ear: External ear normal. Tympanic membrane is not erythematous.      Ears:      Comments: Clear post nasal discharge in oropharynx      Nose:      Right Sinus: No maxillary sinus tenderness or frontal sinus  "tenderness.      Left Sinus: No maxillary sinus tenderness or frontal sinus tenderness.      Mouth/Throat:      Pharynx: Oropharynx is clear.   Eyes:      Pupils: Pupils are equal, round, and reactive to light.   Cardiovascular:      Rate and Rhythm: Normal rate and regular rhythm.      Heart sounds: Normal heart sounds. No murmur. No friction rub. No gallop.    Pulmonary:      Effort: Pulmonary effort is normal. No respiratory distress.      Breath sounds: Normal breath sounds.   Abdominal:      Palpations: Abdomen is soft.      Tenderness: There is no abdominal tenderness.   Lymphadenopathy:      Comments: No palpable lymph nodes in neck   Skin:     General: Skin is warm and dry.   Neurological:      Mental Status: She is alert.         Assessment:       1. Upper respiratory tract infection, unspecified type        Plan:       Bhavna was seen today for follow-up.    Diagnoses and all orders for this visit:    Upper respiratory tract infection, unspecified type  -     COVID-19 Routine Screening; Future  -     fluticasone propionate (FLONASE) 50 mcg/actuation nasal spray; 1 spray (50 mcg total) by Each Nostril route once daily.    Check covid swab  Isolation precautions discussed  Flonase to help with sx  If not resolving, consider course of abx  ED prompts reviewed    Pt has been given instructions populated from [employer] database and has verbalized understanding of the after visit summary and information contained wherein.    Follow up with a primary care provider. May go to ER for acute shortness of breath, lightheadedness, fever, or any other emergent complaints or changes in condition.    "This note will be shared with the patient"    Future Appointments   Date Time Provider Department Center   12/1/2020  3:00 PM COVID TESTING DRIVE THRU, [facility] [place] Located within [facility]   1/5/2021  1:00 PM Craol Ann Poe MD [facility] [place]   1/11/2021  9:30 AM LAB, [place] [facility] [place]   1/13/2021  9:00 AM Yulisa F. " MD [place] [facility] RHEUM [place]

## 2020-12-02 LAB — SARS-COV-2 RNA RESP QL NAA+PROBE: NOT DETECTED

## 2020-12-03 ENCOUNTER — TELEPHONE (OUTPATIENT)
Dept: INTERNAL MEDICINE | Facility: CLINIC | Age: 65
End: 2020-12-03

## 2020-12-03 DIAGNOSIS — J32.9 SINUSITIS, UNSPECIFIED CHRONICITY, UNSPECIFIED LOCATION: Primary | ICD-10-CM

## 2020-12-03 RX ORDER — DOXYCYCLINE 100 MG/1
100 CAPSULE ORAL EVERY 12 HOURS
Qty: 20 CAPSULE | Refills: 0 | Status: SHIPPED | OUTPATIENT
Start: 2020-12-03 | End: 2021-02-24

## 2020-12-03 NOTE — TELEPHONE ENCOUNTER
Spoke with pt, pt stated that she is still not doing well. Can you send her something into the pharmacy?

## 2020-12-03 NOTE — TELEPHONE ENCOUNTER
----- Message from Krupa Pan sent at 12/3/2020  8:38 AM CST -----  Contact: [phone]  Patient is requesting a call back from Divya. Patient states Divya asked her to follow up with her about her ear, patient states she still having ear pain. Patient is requesting a Rx to help her with the ear pain and her sinus issues, headaches and sore throat. Please send Rx to her local [facility] on [address]. Please call and advise

## 2020-12-10 ENCOUNTER — TELEPHONE (OUTPATIENT)
Dept: PHARMACY | Facility: CLINIC | Age: 65
End: 2020-12-10

## 2020-12-19 ENCOUNTER — PATIENT MESSAGE (OUTPATIENT)
Dept: PSYCHIATRY | Facility: CLINIC | Age: 65
End: 2020-12-19

## 2020-12-21 RX ORDER — HYDROCODONE BITARTRATE AND ACETAMINOPHEN 5; 325 MG/1; MG/1
1 TABLET ORAL EVERY 8 HOURS PRN
Qty: 30 TABLET | Refills: 0 | Status: ON HOLD | OUTPATIENT
Start: 2020-12-21 | End: 2021-03-31

## 2020-12-21 NOTE — TELEPHONE ENCOUNTER
----- Message from Edgar Pack sent at 12/21/2020 10:41 AM CST -----  Regarding: [phone]  Requesting an RX refill or new RX.    Is this a refill or new RX: Refill    RX name and strength: traMADoL (ULTRAM) 50 mg tablet    Is this a 30 day or 90 day RX: 40 each    Pharmacy name and phone # (copy/paste from chart):  [id number] - [place], [place] - [address] AT [facility] [address] [phone] (Phone)  [phone] (Fax)      Comments: Bhavna is requesting a callback.

## 2020-12-24 NOTE — PROGRESS NOTES
Digital Medicine: Clinician Follow-Up    Called patient for routine follow up on blood pressure. Patient reports adherence to medication regimen daily and denies missed doses. Patient denies hypotensive s/s (lightheadedness, dizziness, nausea, fatigue); patient denies hypertensive s/s (SOB, CP, severe headaches, changes in vision, dizziness, fatigue).        The history is provided by the patient.      Review of patient's allergies indicates:   -- Etanercept -- Other (See Comments)    --  Other reaction(s): recurrent infections   -- Chloramphenicol sod succinate -- Hives and Other (See Comments)   -- Codeine -- Other (See Comments)    --  Other reaction(s): Stomach upset   -- Nickel sutures (surgical stainless steel) -- Dermatitis    --  Allergic contact dermatitis   -- Adhesive -- Rash    --  Other reaction(s): Rash  Follow-up reason(s): routine follow up.     Hypertension    Patient's blood pressure is stable.   Patient is not experiencing signs/symptoms of hypotension.  Patient is not experiencing signs/symptoms of hypertension.            Last 5 Patient Entered Readings                                      Current 30 Day Average: 113/61     Recent Readings 12/16/2020 12/13/2020 12/13/2020 12/12/2020 12/6/2020    SBP (mmHg) 97 134 148 110 93    DBP (mmHg) 43 59 88 58 48    Pulse 82 90 84 78 79                    ASSESSMENT(S)  Patients BP average is 113/61 mmHg, which is at goal. Patient's BP goal is less than or equal to 130/80.    Hypertension Plan  Continue current therapy.  Continue current diet/physical activity routine.       Addressed patient questions and patient has my contact information if needed prior to next outreach. Patient verbalizes understanding.      Explained to the patient that the Digital Medicine team is not available for emergencies. Advised patient call [facility] ([phone] or [phone]) or [phone] if needed.            There are no preventive care reminders to display for this  patient.  There are no preventive care reminders to display for this patient.      Hypertension Medications             amLODIPine (NORVASC) 10 MG tablet Take 1 tablet (10 mg total) by mouth once daily.    lisinopriL 10 MG tablet Take 1 tablet (10 mg total) by mouth once daily.

## 2020-12-28 DIAGNOSIS — I10 ESSENTIAL HYPERTENSION: Chronic | ICD-10-CM

## 2020-12-28 NOTE — TELEPHONE ENCOUNTER
No new care gaps identified.  Powered by "[employer]. Reference number: [id number]. 12/28/2020 2:22:27 PM   CST

## 2020-12-28 NOTE — TELEPHONE ENCOUNTER
----- Message from Viktor Drake sent at 12/28/2020 11:12 AM CST -----  Requesting an RX refill or new RX.  Is this a refill or new RX: Refill  RX name and strength: amLODIPine (NORVASC) 10 MG tablet  Is this a 30 day or 90 day RX:   Pharmacy name and phone # [id number] - [place] - [address] AT [facility] [address] [phone] (Phone)  [phone] (Fax)      Comments:

## 2020-12-29 RX ORDER — AMLODIPINE BESYLATE 10 MG/1
10 TABLET ORAL DAILY
Qty: 90 TABLET | Refills: 3 | Status: SHIPPED | OUTPATIENT
Start: 2020-12-29 | End: 2021-01-19 | Stop reason: DRUGHIGH

## 2020-12-29 NOTE — PROGRESS NOTES
Refill Authorization Note   Bhavna Landry  is requesting a refill authorization.  Brief Assessment and Rationale for Refill:  Approve     Medication Therapy Plan:       Medication Reconciliation Completed: No   Comments:       Requested Prescriptions   Pending Prescriptions Disp Refills    amLODIPine (NORVASC) 10 MG tablet 90 tablet 3     Sig: Take 1 tablet (10 mg total) by mouth once daily.       Cardiovascular:  Calcium Channel Blockers Passed - 12/28/2020  2:21 PM        Passed - Patient is at least 18 years old        Passed - Last BP in normal range within 360 days.     BP Readings from Last 3 Encounters:   12/01/20 110/62   10/14/20 102/65   10/12/20 135/78              Passed - Office visit in past 12 months or future 90 days     Recent Outpatient Visits            4 weeks ago Upper respiratory tract infection, unspecified type    [facility] Divya De Leon PA-C    2 months ago Osteoporosis, unspecified osteoporosis type, unspecified pathological fracture presence    [id number]    2 months ago Essential hypertension    [facility] Sadaf Vargas MD    3 months ago Paranoid schizophrenia    [facility] Ann Poe [facility]    3 months ago Greater trochanteric bursitis of left hip    [facility] Michelle Pineda Jr., MD          Future Appointments              In 1 week LAB, [facility] [place]    In 2 weeks [id number]                    Appointments  past 12m or future 3m with PCP    Date Provider   Last Visit   10/7/2020 Sadaf Vargas MD   Next Visit   Visit date not found Sadaf Vargas MD   ED visits in past 90 days: 0     Note composed:4:15 PM 12/29/2020

## 2021-01-05 ENCOUNTER — IMMUNIZATION (OUTPATIENT)
Dept: PHARMACY | Facility: CLINIC | Age: 66
End: 2021-01-05
Payer: MEDICARE

## 2021-01-05 ENCOUNTER — OFFICE VISIT (OUTPATIENT)
Dept: PSYCHIATRY | Facility: CLINIC | Age: 66
End: 2021-01-05
Payer: COMMERCIAL

## 2021-01-05 DIAGNOSIS — G47.00 INSOMNIA, UNSPECIFIED TYPE: ICD-10-CM

## 2021-01-05 DIAGNOSIS — F20.0 PARANOID SCHIZOPHRENIA: Primary | ICD-10-CM

## 2021-01-05 DIAGNOSIS — F33.42 RECURRENT MAJOR DEPRESSIVE DISORDER, IN FULL REMISSION: ICD-10-CM

## 2021-01-05 DIAGNOSIS — E87.1 HYPONATREMIA: ICD-10-CM

## 2021-01-05 DIAGNOSIS — G25.9 EXTRAPYRAMIDAL SYNDROME: ICD-10-CM

## 2021-01-05 PROCEDURE — 1159F PR MEDICATION LIST DOCUMENTED IN MEDICAL RECORD: ICD-10-PCS | Mod: 95,,, | Performed by: INTERNAL MEDICINE

## 2021-01-05 PROCEDURE — 99214 PR OFFICE/OUTPT VISIT, EST, LEVL IV, 30-39 MIN: ICD-10-PCS | Mod: 95,,, | Performed by: INTERNAL MEDICINE

## 2021-01-05 PROCEDURE — 1159F MED LIST DOCD IN RCRD: CPT | Mod: 95,,, | Performed by: INTERNAL MEDICINE

## 2021-01-05 PROCEDURE — 99214 OFFICE O/P EST MOD 30 MIN: CPT | Mod: 95,,, | Performed by: INTERNAL MEDICINE

## 2021-01-09 NOTE — TELEPHONE ENCOUNTER
Incoming call from patient to check status of [employer] application. Advised her that her application had not yet been approved but [employer] would complete a status check Monday, 1/11.

## 2021-01-11 ENCOUNTER — LAB VISIT (OUTPATIENT)
Dept: LAB | Facility: HOSPITAL | Age: 66
End: 2021-01-11
Attending: INTERNAL MEDICINE
Payer: MEDICARE

## 2021-01-11 ENCOUNTER — TELEPHONE (OUTPATIENT)
Dept: RHEUMATOLOGY | Facility: CLINIC | Age: 66
End: 2021-01-11

## 2021-01-11 DIAGNOSIS — D64.9 ANEMIA, UNSPECIFIED TYPE: Primary | ICD-10-CM

## 2021-01-11 DIAGNOSIS — Z51.81 ENCOUNTER FOR MONITORING SULFASALAZINE THERAPY: ICD-10-CM

## 2021-01-11 DIAGNOSIS — Z79.899 ENCOUNTER FOR MONITORING SULFASALAZINE THERAPY: ICD-10-CM

## 2021-01-11 LAB
ALBUMIN SERPL BCP-MCNC: 3.8 G/DL (ref 3.5–5.2)
ALP SERPL-CCNC: 57 U/L (ref 55–135)
ALT SERPL W/O P-5'-P-CCNC: 24 U/L (ref 10–44)
ANION GAP SERPL CALC-SCNC: 6 MMOL/L (ref 8–16)
AST SERPL-CCNC: 35 U/L (ref 10–40)
BASOPHILS # BLD AUTO: 0.07 K/UL (ref 0–0.2)
BASOPHILS NFR BLD: 1 % (ref 0–1.9)
BILIRUB SERPL-MCNC: 0.5 MG/DL (ref 0.1–1)
BUN SERPL-MCNC: 6 MG/DL (ref 8–23)
CALCIUM SERPL-MCNC: 9.1 MG/DL (ref 8.7–10.5)
CHLORIDE SERPL-SCNC: 92 MMOL/L (ref 95–110)
CO2 SERPL-SCNC: 29 MMOL/L (ref 23–29)
CREAT SERPL-MCNC: 0.8 MG/DL (ref 0.5–1.4)
CRP SERPL-MCNC: 0.2 MG/L (ref 0–8.2)
DIFFERENTIAL METHOD: ABNORMAL
EOSINOPHIL # BLD AUTO: 0.2 K/UL (ref 0–0.5)
EOSINOPHIL NFR BLD: 2.4 % (ref 0–8)
ERYTHROCYTE [DISTWIDTH] IN BLOOD BY AUTOMATED COUNT: 12.8 % (ref 11.5–14.5)
ERYTHROCYTE [SEDIMENTATION RATE] IN BLOOD BY WESTERGREN METHOD: <2 MM/HR (ref 0–36)
EST. GFR  (AFRICAN AMERICAN): >60 ML/MIN/1.73 M^2
EST. GFR  (NON AFRICAN AMERICAN): >60 ML/MIN/1.73 M^2
GLUCOSE SERPL-MCNC: 174 MG/DL (ref 70–110)
HCT VFR BLD AUTO: 35.9 % (ref 37–48.5)
HGB BLD-MCNC: 11.6 G/DL (ref 12–16)
IMM GRANULOCYTES # BLD AUTO: 0.03 K/UL (ref 0–0.04)
IMM GRANULOCYTES NFR BLD AUTO: 0.4 % (ref 0–0.5)
LYMPHOCYTES # BLD AUTO: 2.2 K/UL (ref 1–4.8)
LYMPHOCYTES NFR BLD: 31.4 % (ref 18–48)
MCH RBC QN AUTO: 33 PG (ref 27–31)
MCHC RBC AUTO-ENTMCNC: 32.3 G/DL (ref 32–36)
MCV RBC AUTO: 102 FL (ref 82–98)
MONOCYTES # BLD AUTO: 0.6 K/UL (ref 0.3–1)
MONOCYTES NFR BLD: 8.7 % (ref 4–15)
NEUTROPHILS # BLD AUTO: 4 K/UL (ref 1.8–7.7)
NEUTROPHILS NFR BLD: 56.1 % (ref 38–73)
NRBC BLD-RTO: 0 /100 WBC
PLATELET # BLD AUTO: 385 K/UL (ref 150–350)
PMV BLD AUTO: 8.7 FL (ref 9.2–12.9)
POTASSIUM SERPL-SCNC: 4.6 MMOL/L (ref 3.5–5.1)
PROT SERPL-MCNC: 6.1 G/DL (ref 6–8.4)
RBC # BLD AUTO: 3.51 M/UL (ref 4–5.4)
SODIUM SERPL-SCNC: 127 MMOL/L (ref 136–145)
WBC # BLD AUTO: 7.13 K/UL (ref 3.9–12.7)

## 2021-01-11 PROCEDURE — 86140 C-REACTIVE PROTEIN: CPT

## 2021-01-11 PROCEDURE — 36415 COLL VENOUS BLD VENIPUNCTURE: CPT | Mod: PO

## 2021-01-11 PROCEDURE — 85652 RBC SED RATE AUTOMATED: CPT

## 2021-01-11 PROCEDURE — 80053 COMPREHEN METABOLIC PANEL: CPT

## 2021-01-11 PROCEDURE — 85025 COMPLETE CBC W/AUTO DIFF WBC: CPT

## 2021-01-12 ENCOUNTER — PATIENT OUTREACH (OUTPATIENT)
Dept: ADMINISTRATIVE | Facility: OTHER | Age: 66
End: 2021-01-12

## 2021-01-12 ENCOUNTER — TELEPHONE (OUTPATIENT)
Dept: RHEUMATOLOGY | Facility: CLINIC | Age: 66
End: 2021-01-12

## 2021-01-13 ENCOUNTER — OFFICE VISIT (OUTPATIENT)
Dept: RHEUMATOLOGY | Facility: CLINIC | Age: 66
End: 2021-01-13
Payer: MEDICARE

## 2021-01-13 VITALS
DIASTOLIC BLOOD PRESSURE: 74 MMHG | WEIGHT: 106.5 LBS | BODY MASS INDEX: 18.87 KG/M2 | SYSTOLIC BLOOD PRESSURE: 131 MMHG | HEIGHT: 63 IN | HEART RATE: 74 BPM

## 2021-01-13 DIAGNOSIS — L40.50 PSORIATIC ARTHRITIS: Primary | ICD-10-CM

## 2021-01-13 DIAGNOSIS — D64.9 ANEMIA, UNSPECIFIED TYPE: ICD-10-CM

## 2021-01-13 DIAGNOSIS — M81.0 OSTEOPOROSIS, UNSPECIFIED OSTEOPOROSIS TYPE, UNSPECIFIED PATHOLOGICAL FRACTURE PRESENCE: ICD-10-CM

## 2021-01-13 DIAGNOSIS — E87.1 HYPONATREMIA: ICD-10-CM

## 2021-01-13 PROCEDURE — 3078F PR MOST RECENT DIASTOLIC BLOOD PRESSURE < 80 MM HG: ICD-10-PCS | Mod: CPTII,GC,S$GLB, | Performed by: STUDENT IN AN ORGANIZED HEALTH CARE EDUCATION/TRAINING PROGRAM

## 2021-01-13 PROCEDURE — 3075F PR MOST RECENT SYSTOLIC BLOOD PRESS GE 130-139MM HG: ICD-10-PCS | Mod: CPTII,GC,S$GLB, | Performed by: STUDENT IN AN ORGANIZED HEALTH CARE EDUCATION/TRAINING PROGRAM

## 2021-01-13 PROCEDURE — 99214 PR OFFICE/OUTPT VISIT, EST, LEVL IV, 30-39 MIN: ICD-10-PCS | Mod: GC,S$GLB,, | Performed by: STUDENT IN AN ORGANIZED HEALTH CARE EDUCATION/TRAINING PROGRAM

## 2021-01-13 PROCEDURE — 1159F PR MEDICATION LIST DOCUMENTED IN MEDICAL RECORD: ICD-10-PCS | Mod: GC,S$GLB,, | Performed by: STUDENT IN AN ORGANIZED HEALTH CARE EDUCATION/TRAINING PROGRAM

## 2021-01-13 PROCEDURE — 99214 OFFICE O/P EST MOD 30 MIN: CPT | Mod: GC,S$GLB,, | Performed by: STUDENT IN AN ORGANIZED HEALTH CARE EDUCATION/TRAINING PROGRAM

## 2021-01-13 PROCEDURE — 99999 PR PBB SHADOW E&M-EST. PATIENT-LVL V: ICD-10-PCS | Mod: PBBFAC,GC,, | Performed by: STUDENT IN AN ORGANIZED HEALTH CARE EDUCATION/TRAINING PROGRAM

## 2021-01-13 PROCEDURE — 3075F SYST BP GE 130 - 139MM HG: CPT | Mod: CPTII,GC,S$GLB, | Performed by: STUDENT IN AN ORGANIZED HEALTH CARE EDUCATION/TRAINING PROGRAM

## 2021-01-13 PROCEDURE — 99999 PR PBB SHADOW E&M-EST. PATIENT-LVL V: CPT | Mod: PBBFAC,GC,, | Performed by: STUDENT IN AN ORGANIZED HEALTH CARE EDUCATION/TRAINING PROGRAM

## 2021-01-13 PROCEDURE — 1159F MED LIST DOCD IN RCRD: CPT | Mod: GC,S$GLB,, | Performed by: STUDENT IN AN ORGANIZED HEALTH CARE EDUCATION/TRAINING PROGRAM

## 2021-01-13 PROCEDURE — 3078F DIAST BP <80 MM HG: CPT | Mod: CPTII,GC,S$GLB, | Performed by: STUDENT IN AN ORGANIZED HEALTH CARE EDUCATION/TRAINING PROGRAM

## 2021-01-13 ASSESSMENT — ROUTINE ASSESSMENT OF PATIENT INDEX DATA (RAPID3)
PAIN SCORE: 3.5
AM STIFFNESS SCORE: 1, YES
PATIENT GLOBAL ASSESSMENT SCORE: 2
PSYCHOLOGICAL DISTRESS SCORE: 2.2
TOTAL RAPID3 SCORE: 2.17
FATIGUE SCORE: 3.5
MDHAQ FUNCTION SCORE: 0.3

## 2021-01-28 ENCOUNTER — TELEPHONE (OUTPATIENT)
Dept: INTERNAL MEDICINE | Facility: CLINIC | Age: 66
End: 2021-01-28

## 2021-02-01 ENCOUNTER — TELEPHONE (OUTPATIENT)
Dept: INTERNAL MEDICINE | Facility: CLINIC | Age: 66
End: 2021-02-01

## 2021-02-02 RX ORDER — AMOXICILLIN 875 MG/1
875 TABLET, FILM COATED ORAL 2 TIMES DAILY
Qty: 14 TABLET | Refills: 0 | Status: SHIPPED | OUTPATIENT
Start: 2021-02-02 | End: 2021-02-09

## 2021-02-03 ENCOUNTER — TELEPHONE (OUTPATIENT)
Dept: INTERNAL MEDICINE | Facility: CLINIC | Age: 66
End: 2021-02-03

## 2021-02-03 DIAGNOSIS — E87.1 HYPONATREMIA: Primary | ICD-10-CM

## 2021-02-12 ENCOUNTER — LAB VISIT (OUTPATIENT)
Dept: LAB | Facility: HOSPITAL | Age: 66
End: 2021-02-12
Attending: STUDENT IN AN ORGANIZED HEALTH CARE EDUCATION/TRAINING PROGRAM
Payer: MEDICARE

## 2021-02-12 DIAGNOSIS — L40.50 PSORIATIC ARTHRITIS: ICD-10-CM

## 2021-02-12 LAB
CHOLEST SERPL-MCNC: 153 MG/DL (ref 120–199)
CHOLEST/HDLC SERPL: 1.7 {RATIO} (ref 2–5)
HDLC SERPL-MCNC: 91 MG/DL (ref 40–75)
HDLC SERPL: 59.5 % (ref 20–50)
LDLC SERPL CALC-MCNC: 51.6 MG/DL (ref 63–159)
NONHDLC SERPL-MCNC: 62 MG/DL
TRIGL SERPL-MCNC: 52 MG/DL (ref 30–150)

## 2021-02-12 PROCEDURE — 36415 COLL VENOUS BLD VENIPUNCTURE: CPT | Mod: PO

## 2021-02-12 PROCEDURE — 80061 LIPID PANEL: CPT

## 2021-02-15 NOTE — TELEPHONE ENCOUNTER
Contacted [facility] to check status of patient's renewal application. Rep Maria Del Rosario states patient was approved 2/2/21-12/31/21 and has received shipment.

## 2021-02-16 ENCOUNTER — PATIENT MESSAGE (OUTPATIENT)
Dept: RHEUMATOLOGY | Facility: CLINIC | Age: 66
End: 2021-02-16

## 2021-02-21 ENCOUNTER — PATIENT MESSAGE (OUTPATIENT)
Dept: INTERNAL MEDICINE | Facility: CLINIC | Age: 66
End: 2021-02-21

## 2021-02-23 ENCOUNTER — PATIENT MESSAGE (OUTPATIENT)
Dept: RHEUMATOLOGY | Facility: CLINIC | Age: 66
End: 2021-02-23

## 2021-02-24 ENCOUNTER — OFFICE VISIT (OUTPATIENT)
Dept: INTERNAL MEDICINE | Facility: CLINIC | Age: 66
End: 2021-02-24
Payer: MEDICARE

## 2021-02-24 VITALS
DIASTOLIC BLOOD PRESSURE: 70 MMHG | TEMPERATURE: 98 F | BODY MASS INDEX: 19.07 KG/M2 | WEIGHT: 103.63 LBS | OXYGEN SATURATION: 95 % | SYSTOLIC BLOOD PRESSURE: 132 MMHG | HEIGHT: 62 IN | HEART RATE: 82 BPM

## 2021-02-24 DIAGNOSIS — J30.9 ALLERGIC RHINITIS, UNSPECIFIED SEASONALITY, UNSPECIFIED TRIGGER: Primary | ICD-10-CM

## 2021-02-24 DIAGNOSIS — E78.5 HYPERLIPIDEMIA, UNSPECIFIED HYPERLIPIDEMIA TYPE: ICD-10-CM

## 2021-02-24 DIAGNOSIS — I10 ESSENTIAL HYPERTENSION: Chronic | ICD-10-CM

## 2021-02-24 PROCEDURE — 1159F PR MEDICATION LIST DOCUMENTED IN MEDICAL RECORD: ICD-10-PCS | Mod: S$GLB,,, | Performed by: PHYSICIAN ASSISTANT

## 2021-02-24 PROCEDURE — 1159F MED LIST DOCD IN RCRD: CPT | Mod: S$GLB,,, | Performed by: PHYSICIAN ASSISTANT

## 2021-02-24 PROCEDURE — 3078F PR MOST RECENT DIASTOLIC BLOOD PRESSURE < 80 MM HG: ICD-10-PCS | Mod: CPTII,S$GLB,, | Performed by: PHYSICIAN ASSISTANT

## 2021-02-24 PROCEDURE — 99999 PR PBB SHADOW E&M-EST. PATIENT-LVL V: CPT | Mod: PBBFAC,,, | Performed by: PHYSICIAN ASSISTANT

## 2021-02-24 PROCEDURE — 3008F PR BODY MASS INDEX (BMI) DOCUMENTED: ICD-10-PCS | Mod: CPTII,S$GLB,, | Performed by: PHYSICIAN ASSISTANT

## 2021-02-24 PROCEDURE — 99214 OFFICE O/P EST MOD 30 MIN: CPT | Mod: S$GLB,,, | Performed by: PHYSICIAN ASSISTANT

## 2021-02-24 PROCEDURE — 99999 PR PBB SHADOW E&M-EST. PATIENT-LVL V: ICD-10-PCS | Mod: PBBFAC,,, | Performed by: PHYSICIAN ASSISTANT

## 2021-02-24 PROCEDURE — 3075F PR MOST RECENT SYSTOLIC BLOOD PRESS GE 130-139MM HG: ICD-10-PCS | Mod: CPTII,S$GLB,, | Performed by: PHYSICIAN ASSISTANT

## 2021-02-24 PROCEDURE — 99214 PR OFFICE/OUTPT VISIT, EST, LEVL IV, 30-39 MIN: ICD-10-PCS | Mod: S$GLB,,, | Performed by: PHYSICIAN ASSISTANT

## 2021-02-24 PROCEDURE — 3075F SYST BP GE 130 - 139MM HG: CPT | Mod: CPTII,S$GLB,, | Performed by: PHYSICIAN ASSISTANT

## 2021-02-24 PROCEDURE — 3078F DIAST BP <80 MM HG: CPT | Mod: CPTII,S$GLB,, | Performed by: PHYSICIAN ASSISTANT

## 2021-02-24 PROCEDURE — 3008F BODY MASS INDEX DOCD: CPT | Mod: CPTII,S$GLB,, | Performed by: PHYSICIAN ASSISTANT

## 2021-02-24 PROCEDURE — 1125F PR PAIN SEVERITY QUANTIFIED, PAIN PRESENT: ICD-10-PCS | Mod: S$GLB,,, | Performed by: PHYSICIAN ASSISTANT

## 2021-02-24 PROCEDURE — 1125F AMNT PAIN NOTED PAIN PRSNT: CPT | Mod: S$GLB,,, | Performed by: PHYSICIAN ASSISTANT

## 2021-02-24 RX ORDER — FLUTICASONE PROPIONATE 50 MCG
SPRAY, SUSPENSION (ML) NASAL
Qty: 48 G | Refills: 0 | Status: SHIPPED | OUTPATIENT
Start: 2021-02-24 | End: 2021-08-17

## 2021-02-24 RX ORDER — AZELASTINE 1 MG/ML
1 SPRAY, METERED NASAL 2 TIMES DAILY
Qty: 30 ML | Refills: 0 | Status: SHIPPED | OUTPATIENT
Start: 2021-02-24 | End: 2022-07-19

## 2021-02-26 ENCOUNTER — LAB VISIT (OUTPATIENT)
Dept: LAB | Facility: HOSPITAL | Age: 66
End: 2021-02-26
Attending: INTERNAL MEDICINE
Payer: MEDICARE

## 2021-02-26 DIAGNOSIS — E87.1 HYPONATREMIA: ICD-10-CM

## 2021-02-26 PROCEDURE — 36415 COLL VENOUS BLD VENIPUNCTURE: CPT | Mod: PO

## 2021-02-26 PROCEDURE — 84443 ASSAY THYROID STIM HORMONE: CPT

## 2021-02-26 PROCEDURE — 83930 ASSAY OF BLOOD OSMOLALITY: CPT

## 2021-02-27 LAB
OSMOLALITY SERPL: 273 MOSM/KG (ref 275–295)
TSH SERPL DL<=0.005 MIU/L-ACNC: 3.6 UIU/ML (ref 0.4–4)

## 2021-03-02 ENCOUNTER — OFFICE VISIT (OUTPATIENT)
Dept: PSYCHIATRY | Facility: CLINIC | Age: 66
End: 2021-03-02
Payer: COMMERCIAL

## 2021-03-02 DIAGNOSIS — F20.0 PARANOID SCHIZOPHRENIA: ICD-10-CM

## 2021-03-02 DIAGNOSIS — F33.42 RECURRENT MAJOR DEPRESSIVE DISORDER, IN FULL REMISSION: ICD-10-CM

## 2021-03-02 DIAGNOSIS — G47.00 INSOMNIA, UNSPECIFIED TYPE: ICD-10-CM

## 2021-03-02 DIAGNOSIS — E87.1 HYPONATREMIA: Primary | ICD-10-CM

## 2021-03-02 PROCEDURE — 99214 PR OFFICE/OUTPT VISIT, EST, LEVL IV, 30-39 MIN: ICD-10-PCS | Mod: 95,,, | Performed by: INTERNAL MEDICINE

## 2021-03-02 PROCEDURE — 1159F MED LIST DOCD IN RCRD: CPT | Mod: 95,,, | Performed by: INTERNAL MEDICINE

## 2021-03-02 PROCEDURE — 1159F PR MEDICATION LIST DOCUMENTED IN MEDICAL RECORD: ICD-10-PCS | Mod: 95,,, | Performed by: INTERNAL MEDICINE

## 2021-03-02 PROCEDURE — 99214 OFFICE O/P EST MOD 30 MIN: CPT | Mod: 95,,, | Performed by: INTERNAL MEDICINE

## 2021-03-04 ENCOUNTER — PATIENT MESSAGE (OUTPATIENT)
Dept: PSYCHIATRY | Facility: CLINIC | Age: 66
End: 2021-03-04

## 2021-03-04 DIAGNOSIS — F33.42 RECURRENT MAJOR DEPRESSIVE DISORDER, IN FULL REMISSION: Primary | ICD-10-CM

## 2021-03-04 RX ORDER — VENLAFAXINE HYDROCHLORIDE 37.5 MG/1
37.5 CAPSULE, EXTENDED RELEASE ORAL DAILY
Qty: 7 CAPSULE | Refills: 0 | Status: SHIPPED | OUTPATIENT
Start: 2021-03-04 | End: 2021-03-16

## 2021-03-10 ENCOUNTER — PATIENT OUTREACH (OUTPATIENT)
Dept: ADMINISTRATIVE | Facility: OTHER | Age: 66
End: 2021-03-10

## 2021-03-11 ENCOUNTER — OFFICE VISIT (OUTPATIENT)
Dept: OTOLARYNGOLOGY | Facility: CLINIC | Age: 66
End: 2021-03-11
Payer: MEDICARE

## 2021-03-11 VITALS — SYSTOLIC BLOOD PRESSURE: 117 MMHG | HEART RATE: 92 BPM | DIASTOLIC BLOOD PRESSURE: 58 MMHG

## 2021-03-11 DIAGNOSIS — Q30.8 HYPOPLASTIC MAXILLARY SINUS: ICD-10-CM

## 2021-03-11 DIAGNOSIS — J34.2 DEVIATED NASAL SEPTUM: ICD-10-CM

## 2021-03-11 DIAGNOSIS — J01.90 ACUTE SINUSITIS, RECURRENCE NOT SPECIFIED, UNSPECIFIED LOCATION: Primary | ICD-10-CM

## 2021-03-11 PROCEDURE — 3078F PR MOST RECENT DIASTOLIC BLOOD PRESSURE < 80 MM HG: ICD-10-PCS | Mod: CPTII,S$GLB,, | Performed by: OTOLARYNGOLOGY

## 2021-03-11 PROCEDURE — 3074F SYST BP LT 130 MM HG: CPT | Mod: CPTII,S$GLB,, | Performed by: OTOLARYNGOLOGY

## 2021-03-11 PROCEDURE — 1159F PR MEDICATION LIST DOCUMENTED IN MEDICAL RECORD: ICD-10-PCS | Mod: S$GLB,,, | Performed by: OTOLARYNGOLOGY

## 2021-03-11 PROCEDURE — 3288F PR FALLS RISK ASSESSMENT DOCUMENTED: ICD-10-PCS | Mod: CPTII,S$GLB,, | Performed by: OTOLARYNGOLOGY

## 2021-03-11 PROCEDURE — 1159F MED LIST DOCD IN RCRD: CPT | Mod: S$GLB,,, | Performed by: OTOLARYNGOLOGY

## 2021-03-11 PROCEDURE — 3078F DIAST BP <80 MM HG: CPT | Mod: CPTII,S$GLB,, | Performed by: OTOLARYNGOLOGY

## 2021-03-11 PROCEDURE — 3288F FALL RISK ASSESSMENT DOCD: CPT | Mod: CPTII,S$GLB,, | Performed by: OTOLARYNGOLOGY

## 2021-03-11 PROCEDURE — 1101F PT FALLS ASSESS-DOCD LE1/YR: CPT | Mod: CPTII,S$GLB,, | Performed by: OTOLARYNGOLOGY

## 2021-03-11 PROCEDURE — 1101F PR PT FALLS ASSESS DOC 0-1 FALLS W/OUT INJ PAST YR: ICD-10-PCS | Mod: CPTII,S$GLB,, | Performed by: OTOLARYNGOLOGY

## 2021-03-11 PROCEDURE — 99204 PR OFFICE/OUTPT VISIT, NEW, LEVL IV, 45-59 MIN: ICD-10-PCS | Mod: S$GLB,,, | Performed by: OTOLARYNGOLOGY

## 2021-03-11 PROCEDURE — 1125F PR PAIN SEVERITY QUANTIFIED, PAIN PRESENT: ICD-10-PCS | Mod: S$GLB,,, | Performed by: OTOLARYNGOLOGY

## 2021-03-11 PROCEDURE — 1125F AMNT PAIN NOTED PAIN PRSNT: CPT | Mod: S$GLB,,, | Performed by: OTOLARYNGOLOGY

## 2021-03-11 PROCEDURE — 3074F PR MOST RECENT SYSTOLIC BLOOD PRESSURE < 130 MM HG: ICD-10-PCS | Mod: CPTII,S$GLB,, | Performed by: OTOLARYNGOLOGY

## 2021-03-11 PROCEDURE — 99999 PR PBB SHADOW E&M-EST. PATIENT-LVL III: CPT | Mod: PBBFAC,,, | Performed by: OTOLARYNGOLOGY

## 2021-03-11 PROCEDURE — 99204 OFFICE O/P NEW MOD 45 MIN: CPT | Mod: S$GLB,,, | Performed by: OTOLARYNGOLOGY

## 2021-03-11 PROCEDURE — 99999 PR PBB SHADOW E&M-EST. PATIENT-LVL III: ICD-10-PCS | Mod: PBBFAC,,, | Performed by: OTOLARYNGOLOGY

## 2021-03-11 RX ORDER — DOXYCYCLINE 100 MG/1
100 CAPSULE ORAL EVERY 12 HOURS
Qty: 28 CAPSULE | Refills: 0 | Status: SHIPPED | OUTPATIENT
Start: 2021-03-11 | End: 2021-03-25

## 2021-03-16 ENCOUNTER — TELEPHONE (OUTPATIENT)
Dept: PSYCHIATRY | Facility: CLINIC | Age: 66
End: 2021-03-16

## 2021-03-16 ENCOUNTER — PATIENT MESSAGE (OUTPATIENT)
Dept: PSYCHIATRY | Facility: CLINIC | Age: 66
End: 2021-03-16

## 2021-03-16 DIAGNOSIS — F41.9 ANXIETY: Primary | ICD-10-CM

## 2021-03-16 RX ORDER — BUSPIRONE HYDROCHLORIDE 5 MG/1
5 TABLET ORAL 3 TIMES DAILY
Qty: 90 TABLET | Refills: 3 | Status: SHIPPED | OUTPATIENT
Start: 2021-03-16 | End: 2021-04-28 | Stop reason: SDUPTHER

## 2021-03-23 ENCOUNTER — LAB VISIT (OUTPATIENT)
Dept: LAB | Facility: HOSPITAL | Age: 66
End: 2021-03-23
Attending: INTERNAL MEDICINE
Payer: MEDICARE

## 2021-03-23 DIAGNOSIS — E87.1 HYPONATREMIA: ICD-10-CM

## 2021-03-23 LAB
ANION GAP SERPL CALC-SCNC: 7 MMOL/L (ref 8–16)
BUN SERPL-MCNC: 5 MG/DL (ref 8–23)
CALCIUM SERPL-MCNC: 8.4 MG/DL (ref 8.7–10.5)
CHLORIDE SERPL-SCNC: 97 MMOL/L (ref 95–110)
CO2 SERPL-SCNC: 23 MMOL/L (ref 23–29)
CREAT SERPL-MCNC: 0.8 MG/DL (ref 0.5–1.4)
EST. GFR  (AFRICAN AMERICAN): >60 ML/MIN/1.73 M^2
EST. GFR  (NON AFRICAN AMERICAN): >60 ML/MIN/1.73 M^2
GLUCOSE SERPL-MCNC: 120 MG/DL (ref 70–110)
POTASSIUM SERPL-SCNC: 4.6 MMOL/L (ref 3.5–5.1)
SODIUM SERPL-SCNC: 127 MMOL/L (ref 136–145)

## 2021-03-23 PROCEDURE — 80048 BASIC METABOLIC PNL TOTAL CA: CPT | Performed by: INTERNAL MEDICINE

## 2021-03-23 PROCEDURE — 36415 COLL VENOUS BLD VENIPUNCTURE: CPT | Mod: PO | Performed by: INTERNAL MEDICINE

## 2021-03-30 ENCOUNTER — TELEPHONE (OUTPATIENT)
Dept: CARDIOLOGY | Facility: CLINIC | Age: 66
End: 2021-03-30

## 2021-03-30 ENCOUNTER — TELEPHONE (OUTPATIENT)
Dept: INTERNAL MEDICINE | Facility: CLINIC | Age: 66
End: 2021-03-30

## 2021-03-30 ENCOUNTER — OFFICE VISIT (OUTPATIENT)
Dept: PSYCHIATRY | Facility: CLINIC | Age: 66
End: 2021-03-30
Payer: MEDICARE

## 2021-03-30 ENCOUNTER — HOSPITAL ENCOUNTER (OUTPATIENT)
Facility: HOSPITAL | Age: 66
Discharge: HOME | End: 2021-03-31
Attending: INTERNAL MEDICINE | Admitting: INTERNAL MEDICINE
Payer: MEDICARE

## 2021-03-30 DIAGNOSIS — F41.9 ANXIETY: Primary | ICD-10-CM

## 2021-03-30 DIAGNOSIS — F32.A DEPRESSION, UNSPECIFIED DEPRESSION TYPE: ICD-10-CM

## 2021-03-30 DIAGNOSIS — F20.9 SCHIZOPHRENIA, UNSPECIFIED TYPE: ICD-10-CM

## 2021-03-30 DIAGNOSIS — F20.0 PARANOID SCHIZOPHRENIA: ICD-10-CM

## 2021-03-30 DIAGNOSIS — M06.9 RHEUMATOID ARTHRITIS, INVOLVING UNSPECIFIED SITE, UNSPECIFIED WHETHER RHEUMATOID FACTOR PRESENT: ICD-10-CM

## 2021-03-30 DIAGNOSIS — I10 ESSENTIAL HYPERTENSION: Primary | ICD-10-CM

## 2021-03-30 DIAGNOSIS — R07.9 CHEST PAIN, UNSPECIFIED TYPE: ICD-10-CM

## 2021-03-30 DIAGNOSIS — R07.9 CHEST PAIN: ICD-10-CM

## 2021-03-30 DIAGNOSIS — I16.0 HYPERTENSIVE URGENCY: ICD-10-CM

## 2021-03-30 DIAGNOSIS — G47.00 INSOMNIA, UNSPECIFIED TYPE: ICD-10-CM

## 2021-03-30 DIAGNOSIS — E87.1 HYPONATREMIA: ICD-10-CM

## 2021-03-30 DIAGNOSIS — D64.9 NORMOCYTIC ANEMIA: ICD-10-CM

## 2021-03-30 DIAGNOSIS — E87.1 HYPONATREMIA: Primary | ICD-10-CM

## 2021-03-30 DIAGNOSIS — E78.2 MIXED HYPERLIPIDEMIA: ICD-10-CM

## 2021-03-30 LAB
ALBUMIN SERPL BCP-MCNC: 3.7 G/DL (ref 3.5–5.2)
ALP SERPL-CCNC: 57 U/L (ref 55–135)
ALT SERPL W/O P-5'-P-CCNC: 22 U/L (ref 10–44)
ANION GAP SERPL CALC-SCNC: 6 MMOL/L (ref 8–16)
AST SERPL-CCNC: 25 U/L (ref 10–40)
BASOPHILS # BLD AUTO: 0.06 K/UL (ref 0–0.2)
BASOPHILS NFR BLD: 1 % (ref 0–1.9)
BILIRUB SERPL-MCNC: 0.3 MG/DL (ref 0.1–1)
BNP SERPL-MCNC: 18 PG/ML (ref 0–99)
BUN SERPL-MCNC: 6 MG/DL (ref 8–23)
CALCIUM SERPL-MCNC: 8.3 MG/DL (ref 8.7–10.5)
CHLORIDE SERPL-SCNC: 94 MMOL/L (ref 95–110)
CO2 SERPL-SCNC: 27 MMOL/L (ref 23–29)
CREAT SERPL-MCNC: 0.7 MG/DL (ref 0.5–1.4)
CTP QC/QA: YES
DIFFERENTIAL METHOD: ABNORMAL
EOSINOPHIL # BLD AUTO: 0.2 K/UL (ref 0–0.5)
EOSINOPHIL NFR BLD: 2.9 % (ref 0–8)
ERYTHROCYTE [DISTWIDTH] IN BLOOD BY AUTOMATED COUNT: 12 % (ref 11.5–14.5)
EST. GFR  (AFRICAN AMERICAN): >60 ML/MIN/1.73 M^2
EST. GFR  (NON AFRICAN AMERICAN): >60 ML/MIN/1.73 M^2
FERRITIN SERPL-MCNC: 49 NG/ML (ref 20–300)
FOLATE SERPL-MCNC: 17.7 NG/ML (ref 4–24)
GLUCOSE SERPL-MCNC: 98 MG/DL (ref 70–110)
HCT VFR BLD AUTO: 33.8 % (ref 37–48.5)
HGB BLD-MCNC: 11.5 G/DL (ref 12–16)
IMM GRANULOCYTES # BLD AUTO: 0.01 K/UL (ref 0–0.04)
IMM GRANULOCYTES NFR BLD AUTO: 0.2 % (ref 0–0.5)
IRON SERPL-MCNC: 79 UG/DL (ref 30–160)
LYMPHOCYTES # BLD AUTO: 2.8 K/UL (ref 1–4.8)
LYMPHOCYTES NFR BLD: 46.9 % (ref 18–48)
MCH RBC QN AUTO: 33 PG (ref 27–31)
MCHC RBC AUTO-ENTMCNC: 34 G/DL (ref 32–36)
MCV RBC AUTO: 97 FL (ref 82–98)
MONOCYTES # BLD AUTO: 0.6 K/UL (ref 0.3–1)
MONOCYTES NFR BLD: 9.4 % (ref 4–15)
NEUTROPHILS # BLD AUTO: 2.4 K/UL (ref 1.8–7.7)
NEUTROPHILS NFR BLD: 39.6 % (ref 38–73)
NRBC BLD-RTO: 0 /100 WBC
PLATELET # BLD AUTO: 383 K/UL (ref 150–450)
PMV BLD AUTO: 8.1 FL (ref 9.2–12.9)
POTASSIUM SERPL-SCNC: 4.4 MMOL/L (ref 3.5–5.1)
PROT SERPL-MCNC: 5.7 G/DL (ref 6–8.4)
RBC # BLD AUTO: 3.49 M/UL (ref 4–5.4)
SARS-COV-2 RDRP RESP QL NAA+PROBE: NEGATIVE
SATURATED IRON: 21 % (ref 20–50)
SODIUM SERPL-SCNC: 127 MMOL/L (ref 136–145)
TOTAL IRON BINDING CAPACITY: 377 UG/DL (ref 250–450)
TRANSFERRIN SERPL-MCNC: 255 MG/DL (ref 200–375)
TROPONIN I SERPL DL<=0.01 NG/ML-MCNC: 0.01 NG/ML (ref 0–0.03)
TROPONIN I SERPL DL<=0.01 NG/ML-MCNC: 0.01 NG/ML (ref 0–0.03)
TROPONIN I SERPL DL<=0.01 NG/ML-MCNC: <0.006 NG/ML (ref 0–0.03)
TROPONIN I SERPL DL<=0.01 NG/ML-MCNC: <0.006 NG/ML (ref 0–0.03)
TSH SERPL DL<=0.005 MIU/L-ACNC: 2.27 UIU/ML (ref 0.4–4)
VIT B12 SERPL-MCNC: >2000 PG/ML (ref 210–950)
WBC # BLD AUTO: 5.95 K/UL (ref 3.9–12.7)

## 2021-03-30 PROCEDURE — 3077F PR MOST RECENT SYSTOLIC BLOOD PRESSURE >= 140 MM HG: ICD-10-PCS | Mod: CPTII,95,, | Performed by: INTERNAL MEDICINE

## 2021-03-30 PROCEDURE — 93010 EKG 12-LEAD: ICD-10-PCS | Mod: ,,, | Performed by: INTERNAL MEDICINE

## 2021-03-30 PROCEDURE — 93005 ELECTROCARDIOGRAM TRACING: CPT

## 2021-03-30 PROCEDURE — 84484 ASSAY OF TROPONIN QUANT: CPT | Performed by: NURSE PRACTITIONER

## 2021-03-30 PROCEDURE — 96360 HYDRATION IV INFUSION INIT: CPT

## 2021-03-30 PROCEDURE — 99285 EMERGENCY DEPT VISIT HI MDM: CPT | Mod: 25

## 2021-03-30 PROCEDURE — 82607 VITAMIN B-12: CPT | Performed by: STUDENT IN AN ORGANIZED HEALTH CARE EDUCATION/TRAINING PROGRAM

## 2021-03-30 PROCEDURE — 96361 HYDRATE IV INFUSION ADD-ON: CPT

## 2021-03-30 PROCEDURE — 80053 COMPREHEN METABOLIC PANEL: CPT | Performed by: NURSE PRACTITIONER

## 2021-03-30 PROCEDURE — 1159F PR MEDICATION LIST DOCUMENTED IN MEDICAL RECORD: ICD-10-PCS | Mod: 95,,, | Performed by: INTERNAL MEDICINE

## 2021-03-30 PROCEDURE — 96372 THER/PROPH/DIAG INJ SC/IM: CPT | Mod: 59

## 2021-03-30 PROCEDURE — 93010 ELECTROCARDIOGRAM REPORT: CPT | Mod: ,,, | Performed by: INTERNAL MEDICINE

## 2021-03-30 PROCEDURE — 82746 ASSAY OF FOLIC ACID SERUM: CPT | Performed by: STUDENT IN AN ORGANIZED HEALTH CARE EDUCATION/TRAINING PROGRAM

## 2021-03-30 PROCEDURE — 99214 PR OFFICE/OUTPT VISIT, EST, LEVL IV, 30-39 MIN: ICD-10-PCS | Mod: 95,,, | Performed by: INTERNAL MEDICINE

## 2021-03-30 PROCEDURE — 85025 COMPLETE CBC W/AUTO DIFF WBC: CPT | Performed by: NURSE PRACTITIONER

## 2021-03-30 PROCEDURE — G0378 HOSPITAL OBSERVATION PER HR: HCPCS

## 2021-03-30 PROCEDURE — 3078F DIAST BP <80 MM HG: CPT | Mod: CPTII,95,, | Performed by: INTERNAL MEDICINE

## 2021-03-30 PROCEDURE — 3077F SYST BP >= 140 MM HG: CPT | Mod: CPTII,95,, | Performed by: INTERNAL MEDICINE

## 2021-03-30 PROCEDURE — 1159F MED LIST DOCD IN RCRD: CPT | Mod: 95,,, | Performed by: INTERNAL MEDICINE

## 2021-03-30 PROCEDURE — 84484 ASSAY OF TROPONIN QUANT: CPT | Mod: 91 | Performed by: STUDENT IN AN ORGANIZED HEALTH CARE EDUCATION/TRAINING PROGRAM

## 2021-03-30 PROCEDURE — 36415 COLL VENOUS BLD VENIPUNCTURE: CPT | Performed by: STUDENT IN AN ORGANIZED HEALTH CARE EDUCATION/TRAINING PROGRAM

## 2021-03-30 PROCEDURE — 83930 ASSAY OF BLOOD OSMOLALITY: CPT | Performed by: STUDENT IN AN ORGANIZED HEALTH CARE EDUCATION/TRAINING PROGRAM

## 2021-03-30 PROCEDURE — 3078F PR MOST RECENT DIASTOLIC BLOOD PRESSURE < 80 MM HG: ICD-10-PCS | Mod: CPTII,95,, | Performed by: INTERNAL MEDICINE

## 2021-03-30 PROCEDURE — U0002 COVID-19 LAB TEST NON-CDC: HCPCS | Performed by: NURSE PRACTITIONER

## 2021-03-30 PROCEDURE — 83880 ASSAY OF NATRIURETIC PEPTIDE: CPT | Performed by: NURSE PRACTITIONER

## 2021-03-30 PROCEDURE — 99214 OFFICE O/P EST MOD 30 MIN: CPT | Mod: 95,,, | Performed by: INTERNAL MEDICINE

## 2021-03-30 PROCEDURE — 63600175 PHARM REV CODE 636 W HCPCS: Performed by: STUDENT IN AN ORGANIZED HEALTH CARE EDUCATION/TRAINING PROGRAM

## 2021-03-30 PROCEDURE — 25000003 PHARM REV CODE 250: Performed by: STUDENT IN AN ORGANIZED HEALTH CARE EDUCATION/TRAINING PROGRAM

## 2021-03-30 PROCEDURE — 83540 ASSAY OF IRON: CPT | Performed by: STUDENT IN AN ORGANIZED HEALTH CARE EDUCATION/TRAINING PROGRAM

## 2021-03-30 PROCEDURE — 25000003 PHARM REV CODE 250: Performed by: NURSE PRACTITIONER

## 2021-03-30 PROCEDURE — 82728 ASSAY OF FERRITIN: CPT | Performed by: STUDENT IN AN ORGANIZED HEALTH CARE EDUCATION/TRAINING PROGRAM

## 2021-03-30 PROCEDURE — 84443 ASSAY THYROID STIM HORMONE: CPT | Performed by: STUDENT IN AN ORGANIZED HEALTH CARE EDUCATION/TRAINING PROGRAM

## 2021-03-30 RX ORDER — MECLIZINE HCL 12.5 MG 12.5 MG/1
12.5 TABLET ORAL 2 TIMES DAILY PRN
Status: DISCONTINUED | OUTPATIENT
Start: 2021-03-30 | End: 2021-03-31 | Stop reason: HOSPADM

## 2021-03-30 RX ORDER — IBUPROFEN 200 MG
16 TABLET ORAL
Status: DISCONTINUED | OUTPATIENT
Start: 2021-03-30 | End: 2021-03-31 | Stop reason: HOSPADM

## 2021-03-30 RX ORDER — ENOXAPARIN SODIUM 100 MG/ML
40 INJECTION SUBCUTANEOUS EVERY 24 HOURS
Status: DISCONTINUED | OUTPATIENT
Start: 2021-03-30 | End: 2021-03-31 | Stop reason: HOSPADM

## 2021-03-30 RX ORDER — BUSPIRONE HYDROCHLORIDE 5 MG/1
5 TABLET ORAL 3 TIMES DAILY
Status: DISCONTINUED | OUTPATIENT
Start: 2021-03-30 | End: 2021-03-31 | Stop reason: HOSPADM

## 2021-03-30 RX ORDER — TRAZODONE HYDROCHLORIDE 50 MG/1
50 TABLET ORAL NIGHTLY PRN
Status: DISCONTINUED | OUTPATIENT
Start: 2021-03-30 | End: 2021-03-31 | Stop reason: HOSPADM

## 2021-03-30 RX ORDER — GLUCAGON 1 MG
1 KIT INJECTION
Status: DISCONTINUED | OUTPATIENT
Start: 2021-03-30 | End: 2021-03-31 | Stop reason: HOSPADM

## 2021-03-30 RX ORDER — ALBUTEROL SULFATE 90 UG/1
1 AEROSOL, METERED RESPIRATORY (INHALATION) EVERY 6 HOURS PRN
Status: DISCONTINUED | OUTPATIENT
Start: 2021-03-30 | End: 2021-03-31 | Stop reason: HOSPADM

## 2021-03-30 RX ORDER — RISPERIDONE 1 MG/1
4 TABLET, ORALLY DISINTEGRATING ORAL NIGHTLY
Status: DISCONTINUED | OUTPATIENT
Start: 2021-03-30 | End: 2021-03-31 | Stop reason: HOSPADM

## 2021-03-30 RX ORDER — SULFASALAZINE 500 MG/1
1500 TABLET, DELAYED RELEASE ORAL 2 TIMES DAILY
Status: DISCONTINUED | OUTPATIENT
Start: 2021-03-30 | End: 2021-03-31 | Stop reason: HOSPADM

## 2021-03-30 RX ORDER — CYANOCOBALAMIN (VITAMIN B-12) 250 MCG
500 TABLET ORAL DAILY
Status: DISCONTINUED | OUTPATIENT
Start: 2021-03-31 | End: 2021-03-31

## 2021-03-30 RX ORDER — RISPERIDONE 0.5 MG/1
2 TABLET, ORALLY DISINTEGRATING ORAL DAILY
Status: DISCONTINUED | OUTPATIENT
Start: 2021-03-31 | End: 2021-03-31 | Stop reason: HOSPADM

## 2021-03-30 RX ORDER — ASCORBIC ACID 500 MG
1500 TABLET ORAL DAILY
Status: DISCONTINUED | OUTPATIENT
Start: 2021-03-31 | End: 2021-03-31 | Stop reason: HOSPADM

## 2021-03-30 RX ORDER — GABAPENTIN 400 MG/1
400 CAPSULE ORAL NIGHTLY
Status: DISCONTINUED | OUTPATIENT
Start: 2021-03-30 | End: 2021-03-31 | Stop reason: HOSPADM

## 2021-03-30 RX ORDER — FLUTICASONE PROPIONATE 50 MCG
1 SPRAY, SUSPENSION (ML) NASAL DAILY
Status: DISCONTINUED | OUTPATIENT
Start: 2021-03-31 | End: 2021-03-31 | Stop reason: HOSPADM

## 2021-03-30 RX ORDER — ATORVASTATIN CALCIUM 40 MG/1
80 TABLET, FILM COATED ORAL DAILY
Status: DISCONTINUED | OUTPATIENT
Start: 2021-03-31 | End: 2021-03-31 | Stop reason: HOSPADM

## 2021-03-30 RX ORDER — AMLODIPINE BESYLATE 5 MG/1
5 TABLET ORAL DAILY
Status: DISCONTINUED | OUTPATIENT
Start: 2021-03-31 | End: 2021-03-31 | Stop reason: HOSPADM

## 2021-03-30 RX ORDER — AZELASTINE 1 MG/ML
1 SPRAY, METERED NASAL 2 TIMES DAILY
Status: DISCONTINUED | OUTPATIENT
Start: 2021-03-30 | End: 2021-03-31 | Stop reason: HOSPADM

## 2021-03-30 RX ORDER — GABAPENTIN 100 MG/1
100 CAPSULE ORAL DAILY
Status: DISCONTINUED | OUTPATIENT
Start: 2021-03-31 | End: 2021-03-31 | Stop reason: HOSPADM

## 2021-03-30 RX ORDER — BENZTROPINE MESYLATE 0.5 MG/1
0.5 TABLET ORAL 2 TIMES DAILY
Status: DISCONTINUED | OUTPATIENT
Start: 2021-03-30 | End: 2021-03-31 | Stop reason: HOSPADM

## 2021-03-30 RX ORDER — SODIUM CHLORIDE 0.9 % (FLUSH) 0.9 %
10 SYRINGE (ML) INJECTION
Status: DISCONTINUED | OUTPATIENT
Start: 2021-03-30 | End: 2021-03-31 | Stop reason: HOSPADM

## 2021-03-30 RX ORDER — LISINOPRIL 5 MG/1
10 TABLET ORAL DAILY
Status: DISCONTINUED | OUTPATIENT
Start: 2021-03-31 | End: 2021-03-31

## 2021-03-30 RX ORDER — IBUPROFEN 200 MG
24 TABLET ORAL
Status: DISCONTINUED | OUTPATIENT
Start: 2021-03-30 | End: 2021-03-31 | Stop reason: HOSPADM

## 2021-03-30 RX ADMIN — BENZTROPINE MESYLATE 0.5 MG: 0.5 TABLET ORAL at 09:03

## 2021-03-30 RX ADMIN — ENOXAPARIN SODIUM 40 MG: 40 INJECTION SUBCUTANEOUS at 06:03

## 2021-03-30 RX ADMIN — BUSPIRONE HYDROCHLORIDE 5 MG: 5 TABLET ORAL at 09:03

## 2021-03-30 RX ADMIN — GABAPENTIN 400 MG: 400 CAPSULE ORAL at 09:03

## 2021-03-30 RX ADMIN — SODIUM CHLORIDE 500 ML: 0.9 INJECTION, SOLUTION INTRAVENOUS at 01:03

## 2021-03-30 RX ADMIN — AZELASTINE HYDROCHLORIDE 137 MCG: 137 SPRAY, METERED NASAL at 09:03

## 2021-03-30 RX ADMIN — RISPERIDONE 4 MG: 1 TABLET, ORALLY DISINTEGRATING ORAL at 09:03

## 2021-03-30 RX ADMIN — NITROGLYCERIN 0.5 INCH: 20 OINTMENT TOPICAL at 12:03

## 2021-03-31 VITALS
HEIGHT: 63 IN | OXYGEN SATURATION: 95 % | RESPIRATION RATE: 18 BRPM | TEMPERATURE: 96 F | WEIGHT: 110 LBS | DIASTOLIC BLOOD PRESSURE: 67 MMHG | HEART RATE: 71 BPM | SYSTOLIC BLOOD PRESSURE: 157 MMHG | BODY MASS INDEX: 19.49 KG/M2

## 2021-03-31 PROBLEM — R07.9 CHEST PAIN: Status: RESOLVED | Noted: 2021-03-30 | Resolved: 2021-03-31

## 2021-03-31 LAB
ALBUMIN SERPL BCP-MCNC: 4 G/DL (ref 3.5–5.2)
ALP SERPL-CCNC: 62 U/L (ref 55–135)
ALT SERPL W/O P-5'-P-CCNC: 24 U/L (ref 10–44)
ANION GAP SERPL CALC-SCNC: 12 MMOL/L (ref 8–16)
AORTIC ROOT ANNULUS: 2.4 CM
AORTIC VALVE CUSP SEPERATION: 1.17 CM
AST SERPL-CCNC: 28 U/L (ref 10–40)
AV INDEX (PROSTH): 0.74
AV MEAN GRADIENT: 7 MMHG
AV PEAK GRADIENT: 19 MMHG
AV VALVE AREA: 1.98 CM2
AV VELOCITY RATIO: 0.74
BASOPHILS # BLD AUTO: 0.09 K/UL (ref 0–0.2)
BASOPHILS NFR BLD: 1.3 % (ref 0–1.9)
BILIRUB SERPL-MCNC: 0.4 MG/DL (ref 0.1–1)
BSA FOR ECHO PROCEDURE: 1.49 M2
BUN SERPL-MCNC: 7 MG/DL (ref 8–23)
CALCIUM SERPL-MCNC: 8.3 MG/DL (ref 8.7–10.5)
CHLORIDE SERPL-SCNC: 98 MMOL/L (ref 95–110)
CO2 SERPL-SCNC: 21 MMOL/L (ref 23–29)
CREAT SERPL-MCNC: 0.7 MG/DL (ref 0.5–1.4)
CV ECHO LV RWT: 0.4 CM
DIFFERENTIAL METHOD: ABNORMAL
DOP CALC AO PEAK VEL: 2.17 M/S
DOP CALC AO VTI: 39.74 CM
DOP CALC LVOT AREA: 2.7 CM2
DOP CALC LVOT DIAMETER: 1.85 CM
DOP CALC LVOT PEAK VEL: 1.6 M/S
DOP CALC LVOT STROKE VOLUME: 78.85 CM3
DOP CALCLVOT PEAK VEL VTI: 29.35 CM
E WAVE DECELERATION TIME: 214.85 MSEC
E/A RATIO: 0.8
E/E' RATIO: 17.6 M/S
ECHO LV POSTERIOR WALL: 0.86 CM (ref 0.6–1.1)
EOSINOPHIL # BLD AUTO: 0.3 K/UL (ref 0–0.5)
EOSINOPHIL NFR BLD: 3.5 % (ref 0–8)
ERYTHROCYTE [DISTWIDTH] IN BLOOD BY AUTOMATED COUNT: 11.7 % (ref 11.5–14.5)
EST. GFR  (AFRICAN AMERICAN): >60 ML/MIN/1.73 M^2
EST. GFR  (NON AFRICAN AMERICAN): >60 ML/MIN/1.73 M^2
FRACTIONAL SHORTENING: 34 % (ref 28–44)
GLUCOSE SERPL-MCNC: 90 MG/DL (ref 70–110)
HCT VFR BLD AUTO: 34.4 % (ref 37–48.5)
HGB BLD-MCNC: 11.9 G/DL (ref 12–16)
IMM GRANULOCYTES # BLD AUTO: 0.02 K/UL (ref 0–0.04)
IMM GRANULOCYTES NFR BLD AUTO: 0.3 % (ref 0–0.5)
INTERVENTRICULAR SEPTUM: 0.98 CM (ref 0.6–1.1)
IVRT: 89.44 MSEC
LA MAJOR: 4.88 CM
LA MINOR: 5.33 CM
LA WIDTH: 3.42 CM
LEFT ATRIUM SIZE: 3.22 CM
LEFT ATRIUM VOLUME INDEX MOD: 21 ML/M2
LEFT ATRIUM VOLUME INDEX: 31.8 ML/M2
LEFT ATRIUM VOLUME MOD: 31.5 CM3
LEFT ATRIUM VOLUME: 47.69 CM3
LEFT INTERNAL DIMENSION IN SYSTOLE: 2.8 CM (ref 2.1–4)
LEFT VENTRICLE DIASTOLIC VOLUME INDEX: 54.39 ML/M2
LEFT VENTRICLE DIASTOLIC VOLUME: 81.59 ML
LEFT VENTRICLE MASS INDEX: 84 G/M2
LEFT VENTRICLE SYSTOLIC VOLUME INDEX: 19.7 ML/M2
LEFT VENTRICLE SYSTOLIC VOLUME: 29.56 ML
LEFT VENTRICULAR INTERNAL DIMENSION IN DIASTOLE: 4.27 CM (ref 3.5–6)
LEFT VENTRICULAR MASS: 125.6 G
LV LATERAL E/E' RATIO: 14.67 M/S
LV SEPTAL E/E' RATIO: 22 M/S
LYMPHOCYTES # BLD AUTO: 3.4 K/UL (ref 1–4.8)
LYMPHOCYTES NFR BLD: 46.9 % (ref 18–48)
MCH RBC QN AUTO: 33.1 PG (ref 27–31)
MCHC RBC AUTO-ENTMCNC: 34.6 G/DL (ref 32–36)
MCV RBC AUTO: 96 FL (ref 82–98)
MONOCYTES # BLD AUTO: 0.7 K/UL (ref 0.3–1)
MONOCYTES NFR BLD: 9.2 % (ref 4–15)
MV A" WAVE DURATION": 14.84 MSEC
MV MEAN GRADIENT: 1 MMHG
MV PEAK A VEL: 1.1 M/S
MV PEAK E VEL: 0.88 M/S
MV PEAK GRADIENT: 6 MMHG
MV STENOSIS PRESSURE HALF TIME: 62.31 MS
MV VALVE AREA P 1/2 METHOD: 3.53 CM2
NEUTROPHILS # BLD AUTO: 2.8 K/UL (ref 1.8–7.7)
NEUTROPHILS NFR BLD: 38.8 % (ref 38–73)
NRBC BLD-RTO: 0 /100 WBC
OSMOLALITY SERPL: 268 MOSM/KG (ref 275–295)
PISA TR MAX VEL: 2.28 M/S
PLATELET # BLD AUTO: 398 K/UL (ref 150–450)
PMV BLD AUTO: 8.4 FL (ref 9.2–12.9)
POTASSIUM SERPL-SCNC: 4.3 MMOL/L (ref 3.5–5.1)
PROT SERPL-MCNC: 6.1 G/DL (ref 6–8.4)
PULM VEIN S/D RATIO: 1.57
PV PEAK D VEL: 0.46 M/S
PV PEAK S VEL: 0.72 M/S
PV PEAK VELOCITY: 0.91 CM/S
RA MAJOR: 4.68 CM
RA PRESSURE: 3 MMHG
RA WIDTH: 3.5 CM
RBC # BLD AUTO: 3.59 M/UL (ref 4–5.4)
RIGHT VENTRICULAR END-DIASTOLIC DIMENSION: 2.13 CM
SODIUM SERPL-SCNC: 131 MMOL/L (ref 136–145)
TDI LATERAL: 0.06 M/S
TDI SEPTAL: 0.04 M/S
TDI: 0.05 M/S
TR MAX PG: 21 MMHG
TV REST PULMONARY ARTERY PRESSURE: 24 MMHG
WBC # BLD AUTO: 7.2 K/UL (ref 3.9–12.7)

## 2021-03-31 PROCEDURE — 94761 N-INVAS EAR/PLS OXIMETRY MLT: CPT

## 2021-03-31 PROCEDURE — 36415 COLL VENOUS BLD VENIPUNCTURE: CPT | Performed by: STUDENT IN AN ORGANIZED HEALTH CARE EDUCATION/TRAINING PROGRAM

## 2021-03-31 PROCEDURE — 25000003 PHARM REV CODE 250: Performed by: STUDENT IN AN ORGANIZED HEALTH CARE EDUCATION/TRAINING PROGRAM

## 2021-03-31 PROCEDURE — 85025 COMPLETE CBC W/AUTO DIFF WBC: CPT | Performed by: STUDENT IN AN ORGANIZED HEALTH CARE EDUCATION/TRAINING PROGRAM

## 2021-03-31 PROCEDURE — 80053 COMPREHEN METABOLIC PANEL: CPT | Performed by: STUDENT IN AN ORGANIZED HEALTH CARE EDUCATION/TRAINING PROGRAM

## 2021-03-31 PROCEDURE — G0378 HOSPITAL OBSERVATION PER HR: HCPCS

## 2021-03-31 PROCEDURE — 25000242 PHARM REV CODE 250 ALT 637 W/ HCPCS: Performed by: STUDENT IN AN ORGANIZED HEALTH CARE EDUCATION/TRAINING PROGRAM

## 2021-03-31 PROCEDURE — 94640 AIRWAY INHALATION TREATMENT: CPT

## 2021-03-31 RX ORDER — PANTOPRAZOLE SODIUM 40 MG/1
40 TABLET, DELAYED RELEASE ORAL DAILY
Status: DISCONTINUED | OUTPATIENT
Start: 2021-03-31 | End: 2021-03-31 | Stop reason: HOSPADM

## 2021-03-31 RX ORDER — LISINOPRIL 20 MG/1
20 TABLET ORAL DAILY
Qty: 90 TABLET | Refills: 3 | Status: SHIPPED | OUTPATIENT
Start: 2021-04-01 | End: 2021-04-07

## 2021-03-31 RX ORDER — TALC
6 POWDER (GRAM) TOPICAL NIGHTLY PRN
Status: DISCONTINUED | OUTPATIENT
Start: 2021-03-31 | End: 2021-03-31 | Stop reason: HOSPADM

## 2021-03-31 RX ORDER — ASPIRIN 81 MG/1
81 TABLET ORAL DAILY
Status: DISCONTINUED | OUTPATIENT
Start: 2021-03-31 | End: 2021-03-31 | Stop reason: HOSPADM

## 2021-03-31 RX ORDER — LISINOPRIL 20 MG/1
20 TABLET ORAL DAILY
Status: DISCONTINUED | OUTPATIENT
Start: 2021-04-01 | End: 2021-03-31 | Stop reason: HOSPADM

## 2021-03-31 RX ORDER — ASPIRIN 81 MG/1
81 TABLET ORAL DAILY
Qty: 360 TABLET | Refills: 0 | Status: SHIPPED | OUTPATIENT
Start: 2021-04-01 | End: 2022-04-01

## 2021-03-31 RX ADMIN — RISPERIDONE 2 MG: 0.5 TABLET, ORALLY DISINTEGRATING ORAL at 08:03

## 2021-03-31 RX ADMIN — AMLODIPINE BESYLATE 5 MG: 5 TABLET ORAL at 08:03

## 2021-03-31 RX ADMIN — OXYCODONE HYDROCHLORIDE AND ACETAMINOPHEN 1500 MG: 500 TABLET ORAL at 08:03

## 2021-03-31 RX ADMIN — PANTOPRAZOLE SODIUM 40 MG: 40 TABLET, DELAYED RELEASE ORAL at 08:03

## 2021-03-31 RX ADMIN — AZELASTINE HYDROCHLORIDE 137 MCG: 137 SPRAY, METERED NASAL at 08:03

## 2021-03-31 RX ADMIN — Medication 81 MG: at 08:03

## 2021-03-31 RX ADMIN — LISINOPRIL 10 MG: 5 TABLET ORAL at 08:03

## 2021-03-31 RX ADMIN — ATORVASTATIN CALCIUM 80 MG: 40 TABLET, FILM COATED ORAL at 08:03

## 2021-03-31 RX ADMIN — BUSPIRONE HYDROCHLORIDE 5 MG: 5 TABLET ORAL at 08:03

## 2021-03-31 RX ADMIN — ALBUTEROL SULFATE 1 PUFF: 90 AEROSOL, METERED RESPIRATORY (INHALATION) at 12:03

## 2021-03-31 RX ADMIN — BENZTROPINE MESYLATE 0.5 MG: 0.5 TABLET ORAL at 08:03

## 2021-03-31 RX ADMIN — TRAZODONE HYDROCHLORIDE 50 MG: 50 TABLET ORAL at 02:03

## 2021-03-31 RX ADMIN — THERA TABS 1 TABLET: TAB at 08:03

## 2021-03-31 RX ADMIN — GABAPENTIN 100 MG: 100 CAPSULE ORAL at 08:03

## 2021-04-01 ENCOUNTER — TELEPHONE (OUTPATIENT)
Dept: CARDIOLOGY | Facility: CLINIC | Age: 66
End: 2021-04-01

## 2021-04-01 ENCOUNTER — OFFICE VISIT (OUTPATIENT)
Dept: CARDIOLOGY | Facility: CLINIC | Age: 66
End: 2021-04-01
Payer: MEDICARE

## 2021-04-01 VITALS
HEART RATE: 68 BPM | BODY MASS INDEX: 18.59 KG/M2 | SYSTOLIC BLOOD PRESSURE: 101 MMHG | DIASTOLIC BLOOD PRESSURE: 64 MMHG | WEIGHT: 104.94 LBS | OXYGEN SATURATION: 97 %

## 2021-04-01 DIAGNOSIS — I48.0 PAF (PAROXYSMAL ATRIAL FIBRILLATION): ICD-10-CM

## 2021-04-01 DIAGNOSIS — I20.89 OTHER FORMS OF ANGINA PECTORIS: Primary | ICD-10-CM

## 2021-04-01 DIAGNOSIS — I10 ESSENTIAL HYPERTENSION: Chronic | ICD-10-CM

## 2021-04-01 DIAGNOSIS — I77.9 BILATERAL CAROTID ARTERY DISEASE, UNSPECIFIED TYPE: ICD-10-CM

## 2021-04-01 DIAGNOSIS — Z91.09 HISTORY OF NICKEL ALLERGY: ICD-10-CM

## 2021-04-01 DIAGNOSIS — F20.0 PARANOID SCHIZOPHRENIA: ICD-10-CM

## 2021-04-01 DIAGNOSIS — E78.5 HYPERLIPIDEMIA, UNSPECIFIED HYPERLIPIDEMIA TYPE: ICD-10-CM

## 2021-04-01 DIAGNOSIS — E87.1 HYPONATREMIA: ICD-10-CM

## 2021-04-01 PROCEDURE — 3074F SYST BP LT 130 MM HG: CPT | Mod: CPTII,S$GLB,, | Performed by: INTERNAL MEDICINE

## 2021-04-01 PROCEDURE — 1101F PT FALLS ASSESS-DOCD LE1/YR: CPT | Mod: CPTII,S$GLB,, | Performed by: INTERNAL MEDICINE

## 2021-04-01 PROCEDURE — 1126F AMNT PAIN NOTED NONE PRSNT: CPT | Mod: S$GLB,,, | Performed by: INTERNAL MEDICINE

## 2021-04-01 PROCEDURE — 1159F MED LIST DOCD IN RCRD: CPT | Mod: S$GLB,,, | Performed by: INTERNAL MEDICINE

## 2021-04-01 PROCEDURE — 3008F BODY MASS INDEX DOCD: CPT | Mod: CPTII,S$GLB,, | Performed by: INTERNAL MEDICINE

## 2021-04-01 PROCEDURE — 1126F PR PAIN SEVERITY QUANTIFIED, NO PAIN PRESENT: ICD-10-PCS | Mod: S$GLB,,, | Performed by: INTERNAL MEDICINE

## 2021-04-01 PROCEDURE — 99999 PR PBB SHADOW E&M-EST. PATIENT-LVL V: ICD-10-PCS | Mod: PBBFAC,,, | Performed by: INTERNAL MEDICINE

## 2021-04-01 PROCEDURE — 99999 PR PBB SHADOW E&M-EST. PATIENT-LVL V: CPT | Mod: PBBFAC,,, | Performed by: INTERNAL MEDICINE

## 2021-04-01 PROCEDURE — 99499 RISK ADDL DX/OHS AUDIT: ICD-10-PCS | Mod: S$GLB,,, | Performed by: INTERNAL MEDICINE

## 2021-04-01 PROCEDURE — 1101F PR PT FALLS ASSESS DOC 0-1 FALLS W/OUT INJ PAST YR: ICD-10-PCS | Mod: CPTII,S$GLB,, | Performed by: INTERNAL MEDICINE

## 2021-04-01 PROCEDURE — 99214 PR OFFICE/OUTPT VISIT, EST, LEVL IV, 30-39 MIN: ICD-10-PCS | Mod: S$GLB,,, | Performed by: INTERNAL MEDICINE

## 2021-04-01 PROCEDURE — 3288F FALL RISK ASSESSMENT DOCD: CPT | Mod: CPTII,S$GLB,, | Performed by: INTERNAL MEDICINE

## 2021-04-01 PROCEDURE — 3074F PR MOST RECENT SYSTOLIC BLOOD PRESSURE < 130 MM HG: ICD-10-PCS | Mod: CPTII,S$GLB,, | Performed by: INTERNAL MEDICINE

## 2021-04-01 PROCEDURE — 1159F PR MEDICATION LIST DOCUMENTED IN MEDICAL RECORD: ICD-10-PCS | Mod: S$GLB,,, | Performed by: INTERNAL MEDICINE

## 2021-04-01 PROCEDURE — 3288F PR FALLS RISK ASSESSMENT DOCUMENTED: ICD-10-PCS | Mod: CPTII,S$GLB,, | Performed by: INTERNAL MEDICINE

## 2021-04-01 PROCEDURE — 3078F DIAST BP <80 MM HG: CPT | Mod: CPTII,S$GLB,, | Performed by: INTERNAL MEDICINE

## 2021-04-01 PROCEDURE — 3078F PR MOST RECENT DIASTOLIC BLOOD PRESSURE < 80 MM HG: ICD-10-PCS | Mod: CPTII,S$GLB,, | Performed by: INTERNAL MEDICINE

## 2021-04-01 PROCEDURE — 99214 OFFICE O/P EST MOD 30 MIN: CPT | Mod: S$GLB,,, | Performed by: INTERNAL MEDICINE

## 2021-04-01 PROCEDURE — 3008F PR BODY MASS INDEX (BMI) DOCUMENTED: ICD-10-PCS | Mod: CPTII,S$GLB,, | Performed by: INTERNAL MEDICINE

## 2021-04-01 PROCEDURE — 99499 UNLISTED E&M SERVICE: CPT | Mod: S$GLB,,, | Performed by: INTERNAL MEDICINE

## 2021-04-05 ENCOUNTER — TELEPHONE (OUTPATIENT)
Dept: ADMINISTRATIVE | Facility: OTHER | Age: 66
End: 2021-04-05

## 2021-04-06 ENCOUNTER — PATIENT MESSAGE (OUTPATIENT)
Dept: CARDIOLOGY | Facility: CLINIC | Age: 66
End: 2021-04-06

## 2021-04-06 DIAGNOSIS — R07.9 CHEST PAIN, UNSPECIFIED TYPE: Primary | ICD-10-CM

## 2021-04-07 ENCOUNTER — TELEPHONE (OUTPATIENT)
Dept: CARDIOLOGY | Facility: CLINIC | Age: 66
End: 2021-04-07

## 2021-04-07 ENCOUNTER — PATIENT MESSAGE (OUTPATIENT)
Dept: PSYCHIATRY | Facility: CLINIC | Age: 66
End: 2021-04-07

## 2021-04-07 ENCOUNTER — TELEPHONE (OUTPATIENT)
Dept: INTERNAL MEDICINE | Facility: CLINIC | Age: 66
End: 2021-04-07

## 2021-04-07 RX ORDER — CLOPIDOGREL BISULFATE 75 MG/1
75 TABLET ORAL DAILY
Qty: 30 TABLET | Refills: 11 | Status: SHIPPED | OUTPATIENT
Start: 2021-04-07 | End: 2021-05-05

## 2021-04-07 RX ORDER — LISINOPRIL 5 MG/1
5 TABLET ORAL DAILY
Qty: 90 TABLET | Refills: 3 | Status: SHIPPED | OUTPATIENT
Start: 2021-04-07 | End: 2021-05-05

## 2021-04-07 RX ORDER — ISOSORBIDE MONONITRATE 30 MG/1
30 TABLET, EXTENDED RELEASE ORAL DAILY
Qty: 30 TABLET | Refills: 11 | Status: SHIPPED | OUTPATIENT
Start: 2021-04-07 | End: 2022-03-22

## 2021-04-09 ENCOUNTER — PATIENT MESSAGE (OUTPATIENT)
Dept: CARDIOLOGY | Facility: CLINIC | Age: 66
End: 2021-04-09

## 2021-04-09 ENCOUNTER — TELEPHONE (OUTPATIENT)
Dept: CARDIOLOGY | Facility: CLINIC | Age: 66
End: 2021-04-09

## 2021-04-12 ENCOUNTER — HOSPITAL ENCOUNTER (OUTPATIENT)
Dept: RADIOLOGY | Facility: HOSPITAL | Age: 66
Discharge: HOME | End: 2021-04-12
Attending: INTERNAL MEDICINE
Payer: MEDICARE

## 2021-04-12 ENCOUNTER — TELEPHONE (OUTPATIENT)
Dept: CARDIOLOGY | Facility: CLINIC | Age: 66
End: 2021-04-12

## 2021-04-12 ENCOUNTER — HOSPITAL ENCOUNTER (OUTPATIENT)
Dept: CARDIOLOGY | Facility: HOSPITAL | Age: 66
Discharge: HOME | End: 2021-04-12
Attending: INTERNAL MEDICINE
Payer: MEDICARE

## 2021-04-12 DIAGNOSIS — I10 ESSENTIAL HYPERTENSION: Chronic | ICD-10-CM

## 2021-04-12 DIAGNOSIS — E78.5 HYPERLIPIDEMIA, UNSPECIFIED HYPERLIPIDEMIA TYPE: ICD-10-CM

## 2021-04-12 DIAGNOSIS — I20.89 OTHER FORMS OF ANGINA PECTORIS: ICD-10-CM

## 2021-04-12 LAB
CV STRESS BASE HR: 66 BPM
DIASTOLIC BLOOD PRESSURE: 70 MMHG
OHS CV CPX 85 PERCENT MAX PREDICTED HEART RATE MALE: 125
OHS CV CPX MAX PREDICTED HEART RATE: 147
OHS CV CPX PATIENT IS FEMALE: 1
OHS CV CPX PATIENT IS MALE: 0
OHS CV CPX PEAK DIASTOLIC BLOOD PRESSURE: 74 MMHG
OHS CV CPX PEAK HEAR RATE: 114 BPM
OHS CV CPX PEAK RATE PRESSURE PRODUCT: NORMAL
OHS CV CPX PEAK SYSTOLIC BLOOD PRESSURE: 174 MMHG
OHS CV CPX PERCENT MAX PREDICTED HEART RATE ACHIEVED: 78
OHS CV CPX RATE PRESSURE PRODUCT PRESENTING: NORMAL
SYSTOLIC BLOOD PRESSURE: 161 MMHG

## 2021-04-12 PROCEDURE — 93016 CV STRESS TEST SUPVJ ONLY: CPT | Mod: ,,, | Performed by: INTERNAL MEDICINE

## 2021-04-12 PROCEDURE — 63600175 PHARM REV CODE 636 W HCPCS: Performed by: INTERNAL MEDICINE

## 2021-04-12 PROCEDURE — 93017 CV STRESS TEST TRACING ONLY: CPT

## 2021-04-12 PROCEDURE — 93016 NUCLEAR STRESS TEST (CUPID ONLY): ICD-10-PCS | Mod: ,,, | Performed by: INTERNAL MEDICINE

## 2021-04-12 PROCEDURE — A9502 TC99M TETROFOSMIN: HCPCS

## 2021-04-12 PROCEDURE — 93018 CV STRESS TEST I&R ONLY: CPT | Mod: ,,, | Performed by: INTERNAL MEDICINE

## 2021-04-12 PROCEDURE — 78452 HT MUSCLE IMAGE SPECT MULT: CPT | Mod: 26,,, | Performed by: RADIOLOGY

## 2021-04-12 PROCEDURE — 93018 NUCLEAR STRESS TEST (CUPID ONLY): ICD-10-PCS | Mod: ,,, | Performed by: INTERNAL MEDICINE

## 2021-04-12 PROCEDURE — 78452 NM MYOCARDIAL PERFUSION SPECT MULTI PHARM: ICD-10-PCS | Mod: 26,,, | Performed by: RADIOLOGY

## 2021-04-12 PROCEDURE — 78452 HT MUSCLE IMAGE SPECT MULT: CPT | Mod: TC

## 2021-04-12 RX ORDER — REGADENOSON 0.08 MG/ML
0.4 INJECTION, SOLUTION INTRAVENOUS ONCE
Status: COMPLETED | OUTPATIENT
Start: 2021-04-12 | End: 2021-04-12

## 2021-04-12 RX ADMIN — REGADENOSON 0.4 MG: 0.08 INJECTION, SOLUTION INTRAVENOUS at 12:04

## 2021-04-13 ENCOUNTER — PATIENT OUTREACH (OUTPATIENT)
Dept: ADMINISTRATIVE | Facility: OTHER | Age: 66
End: 2021-04-13

## 2021-04-13 ENCOUNTER — TELEPHONE (OUTPATIENT)
Dept: CARDIOLOGY | Facility: CLINIC | Age: 66
End: 2021-04-13

## 2021-04-15 ENCOUNTER — OFFICE VISIT (OUTPATIENT)
Dept: OTOLARYNGOLOGY | Facility: CLINIC | Age: 66
End: 2021-04-15
Payer: MEDICARE

## 2021-04-15 VITALS — HEART RATE: 65 BPM | SYSTOLIC BLOOD PRESSURE: 125 MMHG | DIASTOLIC BLOOD PRESSURE: 51 MMHG

## 2021-04-15 DIAGNOSIS — J34.2 DEVIATED NASAL SEPTUM: ICD-10-CM

## 2021-04-15 DIAGNOSIS — J32.0 CHRONIC MAXILLARY SINUSITIS: ICD-10-CM

## 2021-04-15 DIAGNOSIS — Q30.8 HYPOPLASTIC MAXILLARY SINUS: Primary | ICD-10-CM

## 2021-04-15 PROCEDURE — 31231 NASAL ENDOSCOPY DX: CPT | Mod: S$GLB,,, | Performed by: OTOLARYNGOLOGY

## 2021-04-15 PROCEDURE — 3288F FALL RISK ASSESSMENT DOCD: CPT | Mod: CPTII,S$GLB,, | Performed by: OTOLARYNGOLOGY

## 2021-04-15 PROCEDURE — 99999 PR PBB SHADOW E&M-EST. PATIENT-LVL V: ICD-10-PCS | Mod: PBBFAC,,, | Performed by: OTOLARYNGOLOGY

## 2021-04-15 PROCEDURE — 1101F PR PT FALLS ASSESS DOC 0-1 FALLS W/OUT INJ PAST YR: ICD-10-PCS | Mod: CPTII,S$GLB,, | Performed by: OTOLARYNGOLOGY

## 2021-04-15 PROCEDURE — 1125F PR PAIN SEVERITY QUANTIFIED, PAIN PRESENT: ICD-10-PCS | Mod: S$GLB,,, | Performed by: OTOLARYNGOLOGY

## 2021-04-15 PROCEDURE — 99214 OFFICE O/P EST MOD 30 MIN: CPT | Mod: 25,S$GLB,, | Performed by: OTOLARYNGOLOGY

## 2021-04-15 PROCEDURE — 1159F MED LIST DOCD IN RCRD: CPT | Mod: S$GLB,,, | Performed by: OTOLARYNGOLOGY

## 2021-04-15 PROCEDURE — 3288F PR FALLS RISK ASSESSMENT DOCUMENTED: ICD-10-PCS | Mod: CPTII,S$GLB,, | Performed by: OTOLARYNGOLOGY

## 2021-04-15 PROCEDURE — 99214 PR OFFICE/OUTPT VISIT, EST, LEVL IV, 30-39 MIN: ICD-10-PCS | Mod: 25,S$GLB,, | Performed by: OTOLARYNGOLOGY

## 2021-04-15 PROCEDURE — 1125F AMNT PAIN NOTED PAIN PRSNT: CPT | Mod: S$GLB,,, | Performed by: OTOLARYNGOLOGY

## 2021-04-15 PROCEDURE — 1159F PR MEDICATION LIST DOCUMENTED IN MEDICAL RECORD: ICD-10-PCS | Mod: S$GLB,,, | Performed by: OTOLARYNGOLOGY

## 2021-04-15 PROCEDURE — 99999 PR PBB SHADOW E&M-EST. PATIENT-LVL V: CPT | Mod: PBBFAC,,, | Performed by: OTOLARYNGOLOGY

## 2021-04-15 PROCEDURE — 1101F PT FALLS ASSESS-DOCD LE1/YR: CPT | Mod: CPTII,S$GLB,, | Performed by: OTOLARYNGOLOGY

## 2021-04-15 PROCEDURE — 31231 NASAL/SINUS ENDOSCOPY: ICD-10-PCS | Mod: S$GLB,,, | Performed by: OTOLARYNGOLOGY

## 2021-04-21 ENCOUNTER — PATIENT MESSAGE (OUTPATIENT)
Dept: PSYCHIATRY | Facility: CLINIC | Age: 66
End: 2021-04-21

## 2021-04-21 ENCOUNTER — HOSPITAL ENCOUNTER (OUTPATIENT)
Dept: RADIOLOGY | Facility: HOSPITAL | Age: 66
Discharge: HOME | End: 2021-04-21
Attending: OTOLARYNGOLOGY
Payer: MEDICARE

## 2021-04-21 DIAGNOSIS — J32.0 CHRONIC MAXILLARY SINUSITIS: ICD-10-CM

## 2021-04-21 PROCEDURE — 70486 CT MEDTRONIC SINUSES WITHOUT: ICD-10-PCS | Mod: 26,,, | Performed by: SPECIALIST

## 2021-04-21 PROCEDURE — 70486 CT MAXILLOFACIAL W/O DYE: CPT | Mod: 26,,, | Performed by: SPECIALIST

## 2021-04-21 PROCEDURE — 70486 CT MAXILLOFACIAL W/O DYE: CPT | Mod: TC

## 2021-04-22 ENCOUNTER — PATIENT MESSAGE (OUTPATIENT)
Dept: PSYCHIATRY | Facility: CLINIC | Age: 66
End: 2021-04-22

## 2021-04-22 ENCOUNTER — TELEPHONE (OUTPATIENT)
Dept: OTOLARYNGOLOGY | Facility: CLINIC | Age: 66
End: 2021-04-22

## 2021-04-22 DIAGNOSIS — J32.0 CHRONIC MAXILLARY SINUSITIS: Primary | ICD-10-CM

## 2021-04-22 RX ORDER — AMOXICILLIN AND CLAVULANATE POTASSIUM 875; 125 MG/1; MG/1
1 TABLET, FILM COATED ORAL 2 TIMES DAILY
Qty: 20 TABLET | Refills: 0 | Status: SHIPPED | OUTPATIENT
Start: 2021-04-22 | End: 2021-05-02

## 2021-04-23 ENCOUNTER — TELEPHONE (OUTPATIENT)
Dept: PSYCHIATRY | Facility: CLINIC | Age: 66
End: 2021-04-23

## 2021-04-27 ENCOUNTER — TELEPHONE (OUTPATIENT)
Dept: OTOLARYNGOLOGY | Facility: CLINIC | Age: 66
End: 2021-04-27

## 2021-04-28 ENCOUNTER — OFFICE VISIT (OUTPATIENT)
Dept: NEPHROLOGY | Facility: CLINIC | Age: 66
End: 2021-04-28
Payer: MEDICARE

## 2021-04-28 ENCOUNTER — OFFICE VISIT (OUTPATIENT)
Dept: PSYCHIATRY | Facility: CLINIC | Age: 66
End: 2021-04-28
Payer: COMMERCIAL

## 2021-04-28 ENCOUNTER — LAB VISIT (OUTPATIENT)
Dept: LAB | Facility: HOSPITAL | Age: 66
End: 2021-04-28
Attending: STUDENT IN AN ORGANIZED HEALTH CARE EDUCATION/TRAINING PROGRAM
Payer: MEDICARE

## 2021-04-28 VITALS
WEIGHT: 101.44 LBS | DIASTOLIC BLOOD PRESSURE: 80 MMHG | SYSTOLIC BLOOD PRESSURE: 136 MMHG | OXYGEN SATURATION: 96 % | BODY MASS INDEX: 17.96 KG/M2 | HEART RATE: 79 BPM

## 2021-04-28 DIAGNOSIS — R80.9 PROTEINURIA, UNSPECIFIED TYPE: ICD-10-CM

## 2021-04-28 DIAGNOSIS — G89.4 CHRONIC PAIN SYNDROME: ICD-10-CM

## 2021-04-28 DIAGNOSIS — E87.1 HYPONATREMIA: ICD-10-CM

## 2021-04-28 DIAGNOSIS — M81.0 OSTEOPOROSIS, UNSPECIFIED OSTEOPOROSIS TYPE, UNSPECIFIED PATHOLOGICAL FRACTURE PRESENCE: ICD-10-CM

## 2021-04-28 DIAGNOSIS — N18.30 STAGE 3 CHRONIC KIDNEY DISEASE, UNSPECIFIED WHETHER STAGE 3A OR 3B CKD: Primary | ICD-10-CM

## 2021-04-28 DIAGNOSIS — F33.42 RECURRENT MAJOR DEPRESSIVE DISORDER, IN FULL REMISSION: ICD-10-CM

## 2021-04-28 DIAGNOSIS — F41.9 ANXIETY: Primary | ICD-10-CM

## 2021-04-28 DIAGNOSIS — L40.50 PSORIATIC ARTHRITIS: ICD-10-CM

## 2021-04-28 DIAGNOSIS — D50.9 IRON DEFICIENCY ANEMIA, UNSPECIFIED IRON DEFICIENCY ANEMIA TYPE: ICD-10-CM

## 2021-04-28 DIAGNOSIS — F20.0 PARANOID SCHIZOPHRENIA: ICD-10-CM

## 2021-04-28 DIAGNOSIS — D64.9 ANEMIA, UNSPECIFIED TYPE: ICD-10-CM

## 2021-04-28 DIAGNOSIS — G47.00 INSOMNIA, UNSPECIFIED TYPE: ICD-10-CM

## 2021-04-28 PROCEDURE — 1101F PR PT FALLS ASSESS DOC 0-1 FALLS W/OUT INJ PAST YR: ICD-10-PCS | Mod: CPTII,S$GLB,, | Performed by: INTERNAL MEDICINE

## 2021-04-28 PROCEDURE — 1159F MED LIST DOCD IN RCRD: CPT | Mod: 95,,, | Performed by: INTERNAL MEDICINE

## 2021-04-28 PROCEDURE — 99203 PR OFFICE/OUTPT VISIT, NEW, LEVL III, 30-44 MIN: ICD-10-PCS | Mod: S$GLB,,, | Performed by: INTERNAL MEDICINE

## 2021-04-28 PROCEDURE — 99203 OFFICE O/P NEW LOW 30 MIN: CPT | Mod: S$GLB,,, | Performed by: INTERNAL MEDICINE

## 2021-04-28 PROCEDURE — 99999 PR PBB SHADOW E&M-EST. PATIENT-LVL III: CPT | Mod: PBBFAC,,, | Performed by: INTERNAL MEDICINE

## 2021-04-28 PROCEDURE — 3288F FALL RISK ASSESSMENT DOCD: CPT | Mod: CPTII,S$GLB,, | Performed by: INTERNAL MEDICINE

## 2021-04-28 PROCEDURE — 1159F MED LIST DOCD IN RCRD: CPT | Mod: S$GLB,,, | Performed by: INTERNAL MEDICINE

## 2021-04-28 PROCEDURE — 1101F PT FALLS ASSESS-DOCD LE1/YR: CPT | Mod: CPTII,S$GLB,, | Performed by: INTERNAL MEDICINE

## 2021-04-28 PROCEDURE — 3008F PR BODY MASS INDEX (BMI) DOCUMENTED: ICD-10-PCS | Mod: CPTII,S$GLB,, | Performed by: INTERNAL MEDICINE

## 2021-04-28 PROCEDURE — 1159F PR MEDICATION LIST DOCUMENTED IN MEDICAL RECORD: ICD-10-PCS | Mod: S$GLB,,, | Performed by: INTERNAL MEDICINE

## 2021-04-28 PROCEDURE — 3008F BODY MASS INDEX DOCD: CPT | Mod: CPTII,S$GLB,, | Performed by: INTERNAL MEDICINE

## 2021-04-28 PROCEDURE — 99999 PR PBB SHADOW E&M-EST. PATIENT-LVL III: ICD-10-PCS | Mod: PBBFAC,,, | Performed by: INTERNAL MEDICINE

## 2021-04-28 PROCEDURE — 3288F PR FALLS RISK ASSESSMENT DOCUMENTED: ICD-10-PCS | Mod: CPTII,S$GLB,, | Performed by: INTERNAL MEDICINE

## 2021-04-28 PROCEDURE — 99214 OFFICE O/P EST MOD 30 MIN: CPT | Mod: 95,,, | Performed by: INTERNAL MEDICINE

## 2021-04-28 PROCEDURE — 1125F PR PAIN SEVERITY QUANTIFIED, PAIN PRESENT: ICD-10-PCS | Mod: S$GLB,,, | Performed by: INTERNAL MEDICINE

## 2021-04-28 PROCEDURE — 99214 PR OFFICE/OUTPT VISIT, EST, LEVL IV, 30-39 MIN: ICD-10-PCS | Mod: 95,,, | Performed by: INTERNAL MEDICINE

## 2021-04-28 PROCEDURE — 1159F PR MEDICATION LIST DOCUMENTED IN MEDICAL RECORD: ICD-10-PCS | Mod: 95,,, | Performed by: INTERNAL MEDICINE

## 2021-04-28 PROCEDURE — 1125F AMNT PAIN NOTED PAIN PRSNT: CPT | Mod: S$GLB,,, | Performed by: INTERNAL MEDICINE

## 2021-04-28 RX ORDER — BUSPIRONE HYDROCHLORIDE 10 MG/1
10 TABLET ORAL 3 TIMES DAILY
Qty: 90 TABLET | Refills: 5 | Status: SHIPPED | OUTPATIENT
Start: 2021-04-28 | End: 2021-05-26

## 2021-04-28 RX ORDER — GABAPENTIN 400 MG/1
400 CAPSULE ORAL NIGHTLY
Qty: 90 CAPSULE | Refills: 3 | Status: SHIPPED | OUTPATIENT
Start: 2021-04-28 | End: 2021-11-19 | Stop reason: SDUPTHER

## 2021-04-30 ENCOUNTER — TELEPHONE (OUTPATIENT)
Dept: OTOLARYNGOLOGY | Facility: CLINIC | Age: 66
End: 2021-04-30

## 2021-05-03 ENCOUNTER — LAB VISIT (OUTPATIENT)
Dept: LAB | Facility: HOSPITAL | Age: 66
End: 2021-05-03
Attending: INTERNAL MEDICINE
Payer: MEDICARE

## 2021-05-03 DIAGNOSIS — E87.1 HYPONATREMIA: ICD-10-CM

## 2021-05-03 LAB
CREAT 24H UR-MRATE: 28 MG/HR (ref 40–75)
CREAT UR-MCNC: 32 MG/DL (ref 15–325)
CREATININE, URINE (MG/SPEC): 672 MG/SPEC
UREA NITROGEN URINE (MG/SPEC): 2793 MG/SPEC
UREA NITROGEN,TIMED URINE: 116 MG/HR (ref 500–850)
URINE COLLECTION DURATION: 24 HR
URINE COLLECTION DURATION: 24 HR
URINE VOLUME: 2100 ML
URINE VOLUME: 2100 ML
UUN UR-MCNC: 133 MG/DL (ref 140–1050)

## 2021-05-03 PROCEDURE — 84540 ASSAY OF URINE/UREA-N: CPT | Performed by: INTERNAL MEDICINE

## 2021-05-03 PROCEDURE — 82570 ASSAY OF URINE CREATININE: CPT | Performed by: INTERNAL MEDICINE

## 2021-05-04 ENCOUNTER — TELEPHONE (OUTPATIENT)
Dept: CARDIOLOGY | Facility: CLINIC | Age: 66
End: 2021-05-04

## 2021-05-05 ENCOUNTER — OFFICE VISIT (OUTPATIENT)
Dept: CARDIOLOGY | Facility: CLINIC | Age: 66
End: 2021-05-05
Payer: MEDICARE

## 2021-05-05 VITALS
OXYGEN SATURATION: 98 % | BODY MASS INDEX: 18.16 KG/M2 | SYSTOLIC BLOOD PRESSURE: 145 MMHG | WEIGHT: 102.5 LBS | HEART RATE: 86 BPM | DIASTOLIC BLOOD PRESSURE: 81 MMHG

## 2021-05-05 DIAGNOSIS — I10 ESSENTIAL HYPERTENSION: Primary | ICD-10-CM

## 2021-05-05 DIAGNOSIS — E78.5 HYPERLIPIDEMIA, UNSPECIFIED HYPERLIPIDEMIA TYPE: ICD-10-CM

## 2021-05-05 DIAGNOSIS — I48.0 PAF (PAROXYSMAL ATRIAL FIBRILLATION): ICD-10-CM

## 2021-05-05 DIAGNOSIS — I10 ESSENTIAL HYPERTENSION: ICD-10-CM

## 2021-05-05 DIAGNOSIS — I77.9 BILATERAL CAROTID ARTERY DISEASE, UNSPECIFIED TYPE: ICD-10-CM

## 2021-05-05 PROCEDURE — 3288F FALL RISK ASSESSMENT DOCD: CPT | Mod: CPTII,S$GLB,, | Performed by: INTERNAL MEDICINE

## 2021-05-05 PROCEDURE — 99499 UNLISTED E&M SERVICE: CPT | Mod: S$GLB,,, | Performed by: INTERNAL MEDICINE

## 2021-05-05 PROCEDURE — 3079F PR MOST RECENT DIASTOLIC BLOOD PRESSURE 80-89 MM HG: ICD-10-PCS | Mod: CPTII,S$GLB,, | Performed by: INTERNAL MEDICINE

## 2021-05-05 PROCEDURE — 3008F BODY MASS INDEX DOCD: CPT | Mod: CPTII,S$GLB,, | Performed by: INTERNAL MEDICINE

## 2021-05-05 PROCEDURE — 3008F PR BODY MASS INDEX (BMI) DOCUMENTED: ICD-10-PCS | Mod: CPTII,S$GLB,, | Performed by: INTERNAL MEDICINE

## 2021-05-05 PROCEDURE — 1101F PR PT FALLS ASSESS DOC 0-1 FALLS W/OUT INJ PAST YR: ICD-10-PCS | Mod: CPTII,S$GLB,, | Performed by: INTERNAL MEDICINE

## 2021-05-05 PROCEDURE — 99499 RISK ADDL DX/OHS AUDIT: ICD-10-PCS | Mod: S$GLB,,, | Performed by: INTERNAL MEDICINE

## 2021-05-05 PROCEDURE — 1101F PT FALLS ASSESS-DOCD LE1/YR: CPT | Mod: CPTII,S$GLB,, | Performed by: INTERNAL MEDICINE

## 2021-05-05 PROCEDURE — 1126F AMNT PAIN NOTED NONE PRSNT: CPT | Mod: S$GLB,,, | Performed by: INTERNAL MEDICINE

## 2021-05-05 PROCEDURE — 1159F MED LIST DOCD IN RCRD: CPT | Mod: S$GLB,,, | Performed by: INTERNAL MEDICINE

## 2021-05-05 PROCEDURE — 99214 OFFICE O/P EST MOD 30 MIN: CPT | Mod: S$GLB,,, | Performed by: INTERNAL MEDICINE

## 2021-05-05 PROCEDURE — 3077F SYST BP >= 140 MM HG: CPT | Mod: CPTII,S$GLB,, | Performed by: INTERNAL MEDICINE

## 2021-05-05 PROCEDURE — 3077F PR MOST RECENT SYSTOLIC BLOOD PRESSURE >= 140 MM HG: ICD-10-PCS | Mod: CPTII,S$GLB,, | Performed by: INTERNAL MEDICINE

## 2021-05-05 PROCEDURE — 99214 PR OFFICE/OUTPT VISIT, EST, LEVL IV, 30-39 MIN: ICD-10-PCS | Mod: S$GLB,,, | Performed by: INTERNAL MEDICINE

## 2021-05-05 PROCEDURE — 1159F PR MEDICATION LIST DOCUMENTED IN MEDICAL RECORD: ICD-10-PCS | Mod: S$GLB,,, | Performed by: INTERNAL MEDICINE

## 2021-05-05 PROCEDURE — 99999 PR PBB SHADOW E&M-EST. PATIENT-LVL IV: ICD-10-PCS | Mod: PBBFAC,,, | Performed by: INTERNAL MEDICINE

## 2021-05-05 PROCEDURE — 99999 PR PBB SHADOW E&M-EST. PATIENT-LVL IV: CPT | Mod: PBBFAC,,, | Performed by: INTERNAL MEDICINE

## 2021-05-05 PROCEDURE — 1126F PR PAIN SEVERITY QUANTIFIED, NO PAIN PRESENT: ICD-10-PCS | Mod: S$GLB,,, | Performed by: INTERNAL MEDICINE

## 2021-05-05 PROCEDURE — 3079F DIAST BP 80-89 MM HG: CPT | Mod: CPTII,S$GLB,, | Performed by: INTERNAL MEDICINE

## 2021-05-05 PROCEDURE — 3288F PR FALLS RISK ASSESSMENT DOCUMENTED: ICD-10-PCS | Mod: CPTII,S$GLB,, | Performed by: INTERNAL MEDICINE

## 2021-05-05 RX ORDER — ATORVASTATIN CALCIUM 80 MG/1
80 TABLET, FILM COATED ORAL DAILY
Qty: 90 TABLET | Refills: 3 | Status: SHIPPED | OUTPATIENT
Start: 2021-05-05 | End: 2022-04-20

## 2021-05-05 RX ORDER — AMLODIPINE BESYLATE 10 MG/1
10 TABLET ORAL DAILY
Qty: 30 TABLET | Refills: 11 | Status: SHIPPED | OUTPATIENT
Start: 2021-05-05 | End: 2022-05-30 | Stop reason: SDUPTHER

## 2021-05-05 RX ORDER — LISINOPRIL 5 MG/1
10 TABLET ORAL DAILY
Qty: 180 TABLET | Refills: 3 | Status: SHIPPED | OUTPATIENT
Start: 2021-05-05 | End: 2021-10-01 | Stop reason: SDUPTHER

## 2021-05-06 ENCOUNTER — TELEPHONE (OUTPATIENT)
Dept: OTOLARYNGOLOGY | Facility: CLINIC | Age: 66
End: 2021-05-06

## 2021-05-06 DIAGNOSIS — Z01.818 PRE-OP TESTING: Primary | ICD-10-CM

## 2021-05-07 ENCOUNTER — PATIENT MESSAGE (OUTPATIENT)
Dept: PSYCHIATRY | Facility: CLINIC | Age: 66
End: 2021-05-07

## 2021-05-07 ENCOUNTER — TELEPHONE (OUTPATIENT)
Dept: INTERNAL MEDICINE | Facility: CLINIC | Age: 66
End: 2021-05-07

## 2021-05-07 RX ORDER — LISINOPRIL 5 MG/1
10 TABLET ORAL DAILY
Qty: 180 TABLET | Refills: 3 | OUTPATIENT
Start: 2021-05-07 | End: 2022-05-07

## 2021-05-08 ENCOUNTER — LAB VISIT (OUTPATIENT)
Dept: INTERNAL MEDICINE | Facility: CLINIC | Age: 66
End: 2021-05-08
Payer: MEDICARE

## 2021-05-08 DIAGNOSIS — Z01.818 PRE-OP TESTING: ICD-10-CM

## 2021-05-08 PROCEDURE — U0003 INFECTIOUS AGENT DETECTION BY NUCLEIC ACID (DNA OR RNA); SEVERE ACUTE RESPIRATORY SYNDROME CORONAVIRUS 2 (SARS-COV-2) (CORONAVIRUS DISEASE [COVID-19]), AMPLIFIED PROBE TECHNIQUE, MAKING USE OF HIGH THROUGHPUT TECHNOLOGIES AS DESCRIBED BY CMS-2020-01-R: HCPCS | Performed by: OTOLARYNGOLOGY

## 2021-05-08 PROCEDURE — U0005 INFEC AGEN DETEC AMPLI PROBE: HCPCS | Performed by: OTOLARYNGOLOGY

## 2021-05-09 LAB — SARS-COV-2 RNA RESP QL NAA+PROBE: NOT DETECTED

## 2021-05-10 ENCOUNTER — TELEPHONE (OUTPATIENT)
Dept: DERMATOLOGY | Facility: CLINIC | Age: 66
End: 2021-05-10

## 2021-05-11 ENCOUNTER — PROCEDURE VISIT (OUTPATIENT)
Dept: OTOLARYNGOLOGY | Facility: CLINIC | Age: 66
End: 2021-05-11
Payer: MEDICARE

## 2021-05-11 ENCOUNTER — TELEPHONE (OUTPATIENT)
Dept: NEPHROLOGY | Facility: CLINIC | Age: 66
End: 2021-05-11

## 2021-05-11 VITALS
SYSTOLIC BLOOD PRESSURE: 126 MMHG | WEIGHT: 104.25 LBS | DIASTOLIC BLOOD PRESSURE: 70 MMHG | HEART RATE: 70 BPM | BODY MASS INDEX: 18.47 KG/M2

## 2021-05-11 DIAGNOSIS — Q30.8 HYPOPLASTIC MAXILLARY SINUS: Primary | ICD-10-CM

## 2021-05-11 DIAGNOSIS — J32.0 CHRONIC MAXILLARY SINUSITIS: ICD-10-CM

## 2021-05-11 PROCEDURE — 88305 TISSUE EXAM BY PATHOLOGIST: CPT | Mod: 26,,, | Performed by: PATHOLOGY

## 2021-05-11 PROCEDURE — 88305 TISSUE EXAM BY PATHOLOGIST: ICD-10-PCS | Mod: 26,,, | Performed by: PATHOLOGY

## 2021-05-11 PROCEDURE — 31267 ENDOSCOPY MAXILLARY SINUS: CPT | Mod: RT,S$GLB,, | Performed by: OTOLARYNGOLOGY

## 2021-05-11 PROCEDURE — 88305 TISSUE EXAM BY PATHOLOGIST: CPT | Performed by: PATHOLOGY

## 2021-05-11 PROCEDURE — 31267 PR NASAL/SINUS ENDOSCOPY,RMV TISS MAXILL SINUS: ICD-10-PCS | Mod: RT,S$GLB,, | Performed by: OTOLARYNGOLOGY

## 2021-05-12 DIAGNOSIS — L40.50 PSORIATIC ARTHRITIS: ICD-10-CM

## 2021-05-13 RX ORDER — SULFASALAZINE 500 MG/1
1500 TABLET, DELAYED RELEASE ORAL 2 TIMES DAILY
Qty: 540 TABLET | Refills: 0 | Status: SHIPPED | OUTPATIENT
Start: 2021-05-13 | End: 2021-06-10

## 2021-05-14 ENCOUNTER — PATIENT OUTREACH (OUTPATIENT)
Dept: ADMINISTRATIVE | Facility: OTHER | Age: 66
End: 2021-05-14

## 2021-05-17 ENCOUNTER — PATIENT MESSAGE (OUTPATIENT)
Dept: INTERNAL MEDICINE | Facility: CLINIC | Age: 66
End: 2021-05-17

## 2021-05-17 ENCOUNTER — PATIENT MESSAGE (OUTPATIENT)
Dept: PSYCHIATRY | Facility: CLINIC | Age: 66
End: 2021-05-17

## 2021-05-17 ENCOUNTER — OFFICE VISIT (OUTPATIENT)
Dept: RHEUMATOLOGY | Facility: CLINIC | Age: 66
End: 2021-05-17
Payer: MEDICARE

## 2021-05-17 VITALS
DIASTOLIC BLOOD PRESSURE: 68 MMHG | BODY MASS INDEX: 18.95 KG/M2 | WEIGHT: 103 LBS | HEIGHT: 62 IN | SYSTOLIC BLOOD PRESSURE: 113 MMHG | HEART RATE: 79 BPM

## 2021-05-17 DIAGNOSIS — M79.89 LEFT LEG SWELLING: ICD-10-CM

## 2021-05-17 DIAGNOSIS — R60.0 LOCALIZED EDEMA: ICD-10-CM

## 2021-05-17 DIAGNOSIS — Z83.2 FAMILY HISTORY OF CLOTTING DISORDER: Primary | ICD-10-CM

## 2021-05-17 DIAGNOSIS — M85.80 OSTEOPENIA, UNSPECIFIED LOCATION: ICD-10-CM

## 2021-05-17 DIAGNOSIS — F20.9 SCHIZOPHRENIA, UNSPECIFIED TYPE: ICD-10-CM

## 2021-05-17 DIAGNOSIS — L40.50 PSORIATIC ARTHRITIS: Primary | ICD-10-CM

## 2021-05-17 PROCEDURE — 1159F PR MEDICATION LIST DOCUMENTED IN MEDICAL RECORD: ICD-10-PCS | Mod: GC,S$GLB,, | Performed by: STUDENT IN AN ORGANIZED HEALTH CARE EDUCATION/TRAINING PROGRAM

## 2021-05-17 PROCEDURE — 99215 PR OFFICE/OUTPT VISIT, EST, LEVL V, 40-54 MIN: ICD-10-PCS | Mod: GC,S$GLB,, | Performed by: STUDENT IN AN ORGANIZED HEALTH CARE EDUCATION/TRAINING PROGRAM

## 2021-05-17 PROCEDURE — 99999 PR PBB SHADOW E&M-EST. PATIENT-LVL III: CPT | Mod: PBBFAC,GC,, | Performed by: STUDENT IN AN ORGANIZED HEALTH CARE EDUCATION/TRAINING PROGRAM

## 2021-05-17 PROCEDURE — 99215 OFFICE O/P EST HI 40 MIN: CPT | Mod: GC,S$GLB,, | Performed by: STUDENT IN AN ORGANIZED HEALTH CARE EDUCATION/TRAINING PROGRAM

## 2021-05-17 PROCEDURE — 1159F MED LIST DOCD IN RCRD: CPT | Mod: GC,S$GLB,, | Performed by: STUDENT IN AN ORGANIZED HEALTH CARE EDUCATION/TRAINING PROGRAM

## 2021-05-17 PROCEDURE — 99999 PR PBB SHADOW E&M-EST. PATIENT-LVL III: ICD-10-PCS | Mod: PBBFAC,GC,, | Performed by: STUDENT IN AN ORGANIZED HEALTH CARE EDUCATION/TRAINING PROGRAM

## 2021-05-17 ASSESSMENT — ROUTINE ASSESSMENT OF PATIENT INDEX DATA (RAPID3)
MDHAQ FUNCTION SCORE: 0.3
AM STIFFNESS SCORE: 1, YES
TOTAL RAPID3 SCORE: 4.17
PATIENT GLOBAL ASSESSMENT SCORE: 5.5
PAIN SCORE: 6
FATIGUE SCORE: 5
PSYCHOLOGICAL DISTRESS SCORE: 3.3

## 2021-05-18 ENCOUNTER — OFFICE VISIT (OUTPATIENT)
Dept: DERMATOLOGY | Facility: CLINIC | Age: 66
End: 2021-05-18
Payer: MEDICARE

## 2021-05-18 VITALS — WEIGHT: 103 LBS | BODY MASS INDEX: 18.6 KG/M2

## 2021-05-18 DIAGNOSIS — L57.0 ACTINIC KERATOSIS: Primary | ICD-10-CM

## 2021-05-18 DIAGNOSIS — L82.1 SEBORRHEIC KERATOSES: ICD-10-CM

## 2021-05-18 LAB
FINAL PATHOLOGIC DIAGNOSIS: NORMAL
GROSS: NORMAL
Lab: NORMAL

## 2021-05-18 PROCEDURE — 17000 DESTRUCT PREMALG LESION: CPT | Mod: S$GLB,,, | Performed by: DERMATOLOGY

## 2021-05-18 PROCEDURE — 17000 PR DESTRUCTION(LASER SURGERY,CRYOSURGERY,CHEMOSURGERY),PREMALIGNANT LESIONS,FIRST LESION: ICD-10-PCS | Mod: S$GLB,,, | Performed by: DERMATOLOGY

## 2021-05-18 PROCEDURE — 3008F PR BODY MASS INDEX (BMI) DOCUMENTED: ICD-10-PCS | Mod: CPTII,S$GLB,, | Performed by: DERMATOLOGY

## 2021-05-18 PROCEDURE — 1101F PT FALLS ASSESS-DOCD LE1/YR: CPT | Mod: CPTII,S$GLB,, | Performed by: DERMATOLOGY

## 2021-05-18 PROCEDURE — 3008F BODY MASS INDEX DOCD: CPT | Mod: CPTII,S$GLB,, | Performed by: DERMATOLOGY

## 2021-05-18 PROCEDURE — 1159F MED LIST DOCD IN RCRD: CPT | Mod: S$GLB,,, | Performed by: DERMATOLOGY

## 2021-05-18 PROCEDURE — 1126F AMNT PAIN NOTED NONE PRSNT: CPT | Mod: S$GLB,,, | Performed by: DERMATOLOGY

## 2021-05-18 PROCEDURE — 1101F PR PT FALLS ASSESS DOC 0-1 FALLS W/OUT INJ PAST YR: ICD-10-PCS | Mod: CPTII,S$GLB,, | Performed by: DERMATOLOGY

## 2021-05-18 PROCEDURE — 1126F PR PAIN SEVERITY QUANTIFIED, NO PAIN PRESENT: ICD-10-PCS | Mod: S$GLB,,, | Performed by: DERMATOLOGY

## 2021-05-18 PROCEDURE — 99213 PR OFFICE/OUTPT VISIT, EST, LEVL III, 20-29 MIN: ICD-10-PCS | Mod: 25,S$GLB,, | Performed by: DERMATOLOGY

## 2021-05-18 PROCEDURE — 99999 PR PBB SHADOW E&M-EST. PATIENT-LVL IV: CPT | Mod: PBBFAC,,, | Performed by: DERMATOLOGY

## 2021-05-18 PROCEDURE — 99999 PR PBB SHADOW E&M-EST. PATIENT-LVL IV: ICD-10-PCS | Mod: PBBFAC,,, | Performed by: DERMATOLOGY

## 2021-05-18 PROCEDURE — 1159F PR MEDICATION LIST DOCUMENTED IN MEDICAL RECORD: ICD-10-PCS | Mod: S$GLB,,, | Performed by: DERMATOLOGY

## 2021-05-18 PROCEDURE — 3288F FALL RISK ASSESSMENT DOCD: CPT | Mod: CPTII,S$GLB,, | Performed by: DERMATOLOGY

## 2021-05-18 PROCEDURE — 3288F PR FALLS RISK ASSESSMENT DOCUMENTED: ICD-10-PCS | Mod: CPTII,S$GLB,, | Performed by: DERMATOLOGY

## 2021-05-18 PROCEDURE — 99213 OFFICE O/P EST LOW 20 MIN: CPT | Mod: 25,S$GLB,, | Performed by: DERMATOLOGY

## 2021-05-25 ENCOUNTER — OFFICE VISIT (OUTPATIENT)
Dept: OTOLARYNGOLOGY | Facility: CLINIC | Age: 66
End: 2021-05-25
Payer: MEDICARE

## 2021-05-25 ENCOUNTER — PATIENT MESSAGE (OUTPATIENT)
Dept: PSYCHIATRY | Facility: CLINIC | Age: 66
End: 2021-05-25

## 2021-05-25 VITALS — WEIGHT: 104.5 LBS | BODY MASS INDEX: 18.87 KG/M2

## 2021-05-25 DIAGNOSIS — Q30.8 HYPOPLASTIC MAXILLARY SINUS: Primary | ICD-10-CM

## 2021-05-25 DIAGNOSIS — J32.0 CHRONIC MAXILLARY SINUSITIS: ICD-10-CM

## 2021-05-25 PROCEDURE — 3008F PR BODY MASS INDEX (BMI) DOCUMENTED: ICD-10-PCS | Mod: CPTII,S$GLB,, | Performed by: OTOLARYNGOLOGY

## 2021-05-25 PROCEDURE — 99999 PR PBB SHADOW E&M-EST. PATIENT-LVL IV: ICD-10-PCS | Mod: PBBFAC,,, | Performed by: OTOLARYNGOLOGY

## 2021-05-25 PROCEDURE — 3288F FALL RISK ASSESSMENT DOCD: CPT | Mod: CPTII,S$GLB,, | Performed by: OTOLARYNGOLOGY

## 2021-05-25 PROCEDURE — 1159F MED LIST DOCD IN RCRD: CPT | Mod: S$GLB,,, | Performed by: OTOLARYNGOLOGY

## 2021-05-25 PROCEDURE — 31231 NASAL ENDOSCOPY DX: CPT | Mod: S$GLB,,, | Performed by: OTOLARYNGOLOGY

## 2021-05-25 PROCEDURE — 3008F BODY MASS INDEX DOCD: CPT | Mod: CPTII,S$GLB,, | Performed by: OTOLARYNGOLOGY

## 2021-05-25 PROCEDURE — 1126F AMNT PAIN NOTED NONE PRSNT: CPT | Mod: S$GLB,,, | Performed by: OTOLARYNGOLOGY

## 2021-05-25 PROCEDURE — 1126F PR PAIN SEVERITY QUANTIFIED, NO PAIN PRESENT: ICD-10-PCS | Mod: S$GLB,,, | Performed by: OTOLARYNGOLOGY

## 2021-05-25 PROCEDURE — 1101F PR PT FALLS ASSESS DOC 0-1 FALLS W/OUT INJ PAST YR: ICD-10-PCS | Mod: CPTII,S$GLB,, | Performed by: OTOLARYNGOLOGY

## 2021-05-25 PROCEDURE — 3288F PR FALLS RISK ASSESSMENT DOCUMENTED: ICD-10-PCS | Mod: CPTII,S$GLB,, | Performed by: OTOLARYNGOLOGY

## 2021-05-25 PROCEDURE — 31231 NASAL/SINUS ENDOSCOPY: ICD-10-PCS | Mod: S$GLB,,, | Performed by: OTOLARYNGOLOGY

## 2021-05-25 PROCEDURE — 99999 PR PBB SHADOW E&M-EST. PATIENT-LVL IV: CPT | Mod: PBBFAC,,, | Performed by: OTOLARYNGOLOGY

## 2021-05-25 PROCEDURE — 1101F PT FALLS ASSESS-DOCD LE1/YR: CPT | Mod: CPTII,S$GLB,, | Performed by: OTOLARYNGOLOGY

## 2021-05-25 PROCEDURE — 1159F PR MEDICATION LIST DOCUMENTED IN MEDICAL RECORD: ICD-10-PCS | Mod: S$GLB,,, | Performed by: OTOLARYNGOLOGY

## 2021-05-25 PROCEDURE — 99212 PR OFFICE/OUTPT VISIT, EST, LEVL II, 10-19 MIN: ICD-10-PCS | Mod: 25,S$GLB,, | Performed by: OTOLARYNGOLOGY

## 2021-05-25 PROCEDURE — 99212 OFFICE O/P EST SF 10 MIN: CPT | Mod: 25,S$GLB,, | Performed by: OTOLARYNGOLOGY

## 2021-05-26 ENCOUNTER — PATIENT MESSAGE (OUTPATIENT)
Dept: PSYCHIATRY | Facility: CLINIC | Age: 66
End: 2021-05-26

## 2021-05-26 DIAGNOSIS — F41.9 ANXIETY: Primary | ICD-10-CM

## 2021-05-26 DIAGNOSIS — F33.42 RECURRENT MAJOR DEPRESSIVE DISORDER, IN FULL REMISSION: ICD-10-CM

## 2021-05-26 RX ORDER — VENLAFAXINE HYDROCHLORIDE 75 MG/1
75 CAPSULE, EXTENDED RELEASE ORAL DAILY
Qty: 30 CAPSULE | Refills: 0 | Status: SHIPPED | OUTPATIENT
Start: 2021-05-26 | End: 2021-06-23

## 2021-05-31 ENCOUNTER — LAB VISIT (OUTPATIENT)
Dept: LAB | Facility: HOSPITAL | Age: 66
End: 2021-05-31
Attending: INTERNAL MEDICINE
Payer: MEDICARE

## 2021-05-31 ENCOUNTER — OFFICE VISIT (OUTPATIENT)
Dept: NEPHROLOGY | Facility: CLINIC | Age: 66
End: 2021-05-31
Payer: MEDICARE

## 2021-05-31 VITALS
DIASTOLIC BLOOD PRESSURE: 60 MMHG | HEIGHT: 62 IN | OXYGEN SATURATION: 92 % | SYSTOLIC BLOOD PRESSURE: 110 MMHG | WEIGHT: 103.19 LBS | HEART RATE: 85 BPM | BODY MASS INDEX: 18.99 KG/M2

## 2021-05-31 DIAGNOSIS — E87.1 HYPONATREMIA: ICD-10-CM

## 2021-05-31 DIAGNOSIS — E87.1 HYPONATREMIA: Primary | ICD-10-CM

## 2021-05-31 LAB
ANION GAP SERPL CALC-SCNC: 9 MMOL/L (ref 8–16)
BUN SERPL-MCNC: 9 MG/DL (ref 8–23)
CALCIUM SERPL-MCNC: 9.1 MG/DL (ref 8.7–10.5)
CHLORIDE SERPL-SCNC: 93 MMOL/L (ref 95–110)
CO2 SERPL-SCNC: 27 MMOL/L (ref 23–29)
CREAT SERPL-MCNC: 0.8 MG/DL (ref 0.5–1.4)
EST. GFR  (AFRICAN AMERICAN): >60 ML/MIN/1.73 M^2
EST. GFR  (NON AFRICAN AMERICAN): >60 ML/MIN/1.73 M^2
GLUCOSE SERPL-MCNC: 74 MG/DL (ref 70–110)
OSMOLALITY SERPL: 273 MOSM/KG (ref 275–295)
POTASSIUM SERPL-SCNC: 4.6 MMOL/L (ref 3.5–5.1)
SODIUM SERPL-SCNC: 129 MMOL/L (ref 136–145)

## 2021-05-31 PROCEDURE — 99999 PR PBB SHADOW E&M-EST. PATIENT-LVL IV: ICD-10-PCS | Mod: PBBFAC,,, | Performed by: INTERNAL MEDICINE

## 2021-05-31 PROCEDURE — 80048 BASIC METABOLIC PNL TOTAL CA: CPT | Performed by: INTERNAL MEDICINE

## 2021-05-31 PROCEDURE — 1101F PR PT FALLS ASSESS DOC 0-1 FALLS W/OUT INJ PAST YR: ICD-10-PCS | Mod: CPTII,S$GLB,, | Performed by: INTERNAL MEDICINE

## 2021-05-31 PROCEDURE — 83930 ASSAY OF BLOOD OSMOLALITY: CPT | Performed by: INTERNAL MEDICINE

## 2021-05-31 PROCEDURE — 99215 PR OFFICE/OUTPT VISIT, EST, LEVL V, 40-54 MIN: ICD-10-PCS | Mod: S$GLB,,, | Performed by: INTERNAL MEDICINE

## 2021-05-31 PROCEDURE — 3008F PR BODY MASS INDEX (BMI) DOCUMENTED: ICD-10-PCS | Mod: CPTII,S$GLB,, | Performed by: INTERNAL MEDICINE

## 2021-05-31 PROCEDURE — 1126F PR PAIN SEVERITY QUANTIFIED, NO PAIN PRESENT: ICD-10-PCS | Mod: S$GLB,,, | Performed by: INTERNAL MEDICINE

## 2021-05-31 PROCEDURE — 3288F FALL RISK ASSESSMENT DOCD: CPT | Mod: CPTII,S$GLB,, | Performed by: INTERNAL MEDICINE

## 2021-05-31 PROCEDURE — 99999 PR PBB SHADOW E&M-EST. PATIENT-LVL IV: CPT | Mod: PBBFAC,,, | Performed by: INTERNAL MEDICINE

## 2021-05-31 PROCEDURE — 3008F BODY MASS INDEX DOCD: CPT | Mod: CPTII,S$GLB,, | Performed by: INTERNAL MEDICINE

## 2021-05-31 PROCEDURE — 1159F PR MEDICATION LIST DOCUMENTED IN MEDICAL RECORD: ICD-10-PCS | Mod: S$GLB,,, | Performed by: INTERNAL MEDICINE

## 2021-05-31 PROCEDURE — 99215 OFFICE O/P EST HI 40 MIN: CPT | Mod: S$GLB,,, | Performed by: INTERNAL MEDICINE

## 2021-05-31 PROCEDURE — 1159F MED LIST DOCD IN RCRD: CPT | Mod: S$GLB,,, | Performed by: INTERNAL MEDICINE

## 2021-05-31 PROCEDURE — 1126F AMNT PAIN NOTED NONE PRSNT: CPT | Mod: S$GLB,,, | Performed by: INTERNAL MEDICINE

## 2021-05-31 PROCEDURE — 3288F PR FALLS RISK ASSESSMENT DOCUMENTED: ICD-10-PCS | Mod: CPTII,S$GLB,, | Performed by: INTERNAL MEDICINE

## 2021-05-31 PROCEDURE — 36415 COLL VENOUS BLD VENIPUNCTURE: CPT | Performed by: INTERNAL MEDICINE

## 2021-05-31 PROCEDURE — 1101F PT FALLS ASSESS-DOCD LE1/YR: CPT | Mod: CPTII,S$GLB,, | Performed by: INTERNAL MEDICINE

## 2021-06-02 ENCOUNTER — HOSPITAL ENCOUNTER (OUTPATIENT)
Dept: RADIOLOGY | Facility: HOSPITAL | Age: 66
Discharge: HOME | End: 2021-06-02
Attending: STUDENT IN AN ORGANIZED HEALTH CARE EDUCATION/TRAINING PROGRAM
Payer: MEDICARE

## 2021-06-02 ENCOUNTER — PATIENT MESSAGE (OUTPATIENT)
Dept: RHEUMATOLOGY | Facility: CLINIC | Age: 66
End: 2021-06-02

## 2021-06-02 DIAGNOSIS — R60.0 LOCALIZED EDEMA: ICD-10-CM

## 2021-06-02 DIAGNOSIS — M79.89 LEFT LEG SWELLING: ICD-10-CM

## 2021-06-02 PROCEDURE — 93971 US LOWER EXTREMITY VEINS LEFT: ICD-10-PCS | Mod: 26,LT,, | Performed by: RADIOLOGY

## 2021-06-02 PROCEDURE — 93971 EXTREMITY STUDY: CPT | Mod: 26,LT,, | Performed by: RADIOLOGY

## 2021-06-02 PROCEDURE — 93971 EXTREMITY STUDY: CPT | Mod: TC,LT

## 2021-06-07 ENCOUNTER — TELEPHONE (OUTPATIENT)
Dept: NEPHROLOGY | Facility: CLINIC | Age: 66
End: 2021-06-07

## 2021-06-09 ENCOUNTER — PATIENT MESSAGE (OUTPATIENT)
Dept: RHEUMATOLOGY | Facility: CLINIC | Age: 66
End: 2021-06-09

## 2021-06-10 ENCOUNTER — PATIENT MESSAGE (OUTPATIENT)
Dept: RHEUMATOLOGY | Facility: CLINIC | Age: 66
End: 2021-06-10

## 2021-06-10 ENCOUNTER — TELEPHONE (OUTPATIENT)
Dept: NEPHROLOGY | Facility: CLINIC | Age: 66
End: 2021-06-10

## 2021-06-10 DIAGNOSIS — L40.50 PSORIATIC ARTHRITIS: Primary | ICD-10-CM

## 2021-06-10 RX ORDER — FOLIC ACID 1 MG/1
1 TABLET ORAL DAILY
Qty: 90 TABLET | Refills: 0 | Status: SHIPPED | OUTPATIENT
Start: 2021-06-10 | End: 2021-10-25 | Stop reason: SDUPTHER

## 2021-06-10 RX ORDER — METHOTREXATE 2.5 MG/1
10 TABLET ORAL
Qty: 16 TABLET | Refills: 0 | Status: SHIPPED | OUTPATIENT
Start: 2021-06-10 | End: 2021-07-19 | Stop reason: SDUPTHER

## 2021-06-25 NOTE — PLAN OF CARE
Problem: Adult Behavioral Health Plan of Care  Goal: Plan of Care Review  Outcome: Ongoing (interventions implemented as appropriate)  No management issues overnight. The patient is medication compliant at this time. No PRN medications administered. Patient endorsed feeling anxious this evening and poor sleep related to her anxiety. The patient appeared guarded this evening and more withdrawn. She did not engage with her peers tonight. No manic or disorganized behavior displayed by the patient this evening. Her thought content remains appropriate. MVC maintained. Frequent safety rounds performed. Will continue to monitor.    Problem: Sleep Impairment (Anxiety Signs/Symptoms)  Goal: Improved Sleep Hygiene (Anxiety Signs/Symptoms)  Outcome: Ongoing (interventions implemented as appropriate)  The patient appears to be less restless this evening but continues to not be able to stay asleep in her bed. She was observed leaving her bed to sleep in the day-room. When sleeping in her bed, the patient does not lay down, instead she appears to sleep sitting up.     Problem: Mental State/Mood Impairment (Psychotic Signs/Symptoms)  Goal: Improved Mental State and Mood (Psychotic Signs/Symptoms)  Outcome: Ongoing (interventions implemented as appropriate)  The patient endorsed an anxious mood this evening. Per the patient she feels anxious at night time and this causes her to feel restless and experience difficulty sleeping. The patient's affect was congruent with her stated mood. She was guarded this evening on the unit.     Problem: Cognitive Impairment (Manic/Hypomanic Signs/Symptoms)  Goal: Improved Cognitive Function (Manic/Hypomanic Signs/Symptoms)  Outcome: Ongoing (interventions implemented as appropriate)  No manic behavior displayed overnight. The patient appeared less restless this evening as well. Her thought process and content appear linear and relevant.        show

## 2021-06-29 ENCOUNTER — LAB VISIT (OUTPATIENT)
Dept: LAB | Facility: HOSPITAL | Age: 66
End: 2021-06-29
Attending: INTERNAL MEDICINE
Payer: MEDICARE

## 2021-06-29 DIAGNOSIS — E87.1 HYPONATREMIA: ICD-10-CM

## 2021-06-29 LAB
ANION GAP SERPL CALC-SCNC: 7 MMOL/L (ref 8–16)
BUN SERPL-MCNC: 6 MG/DL (ref 8–23)
CALCIUM SERPL-MCNC: 8.7 MG/DL (ref 8.7–10.5)
CHLORIDE SERPL-SCNC: 101 MMOL/L (ref 95–110)
CO2 SERPL-SCNC: 27 MMOL/L (ref 23–29)
CREAT SERPL-MCNC: 0.8 MG/DL (ref 0.5–1.4)
EST. GFR  (AFRICAN AMERICAN): >60 ML/MIN/1.73 M^2
EST. GFR  (NON AFRICAN AMERICAN): >60 ML/MIN/1.73 M^2
GLUCOSE SERPL-MCNC: 88 MG/DL (ref 70–110)
OSMOLALITY SERPL: 277 MOSM/KG (ref 275–295)
POTASSIUM SERPL-SCNC: 4.5 MMOL/L (ref 3.5–5.1)
SODIUM SERPL-SCNC: 135 MMOL/L (ref 136–145)

## 2021-06-29 PROCEDURE — 80048 BASIC METABOLIC PNL TOTAL CA: CPT | Performed by: INTERNAL MEDICINE

## 2021-06-29 PROCEDURE — 83930 ASSAY OF BLOOD OSMOLALITY: CPT | Performed by: INTERNAL MEDICINE

## 2021-06-29 PROCEDURE — 36415 COLL VENOUS BLD VENIPUNCTURE: CPT | Mod: PO | Performed by: INTERNAL MEDICINE

## 2021-07-02 ENCOUNTER — TELEPHONE (OUTPATIENT)
Dept: GENETICS | Facility: CLINIC | Age: 66
End: 2021-07-02

## 2021-07-09 ENCOUNTER — PATIENT MESSAGE (OUTPATIENT)
Dept: RHEUMATOLOGY | Facility: CLINIC | Age: 66
End: 2021-07-09

## 2021-07-09 ENCOUNTER — TELEPHONE (OUTPATIENT)
Dept: NEPHROLOGY | Facility: CLINIC | Age: 66
End: 2021-07-09

## 2021-07-09 ENCOUNTER — LAB VISIT (OUTPATIENT)
Dept: LAB | Facility: HOSPITAL | Age: 66
End: 2021-07-09
Attending: STUDENT IN AN ORGANIZED HEALTH CARE EDUCATION/TRAINING PROGRAM
Payer: MEDICARE

## 2021-07-09 ENCOUNTER — TELEPHONE (OUTPATIENT)
Dept: RHEUMATOLOGY | Facility: CLINIC | Age: 66
End: 2021-07-09

## 2021-07-09 DIAGNOSIS — E87.1 HYPONATREMIA: ICD-10-CM

## 2021-07-09 DIAGNOSIS — L40.50 PSORIATIC ARTHRITIS: ICD-10-CM

## 2021-07-09 LAB
ALBUMIN SERPL BCP-MCNC: 3.7 G/DL (ref 3.5–5.2)
ALP SERPL-CCNC: 68 U/L (ref 55–135)
ALT SERPL W/O P-5'-P-CCNC: 38 U/L (ref 10–44)
ANION GAP SERPL CALC-SCNC: 5 MMOL/L (ref 8–16)
AST SERPL-CCNC: 35 U/L (ref 10–40)
BASOPHILS # BLD AUTO: 0.1 K/UL (ref 0–0.2)
BASOPHILS NFR BLD: 0.9 % (ref 0–1.9)
BILIRUB SERPL-MCNC: 0.2 MG/DL (ref 0.1–1)
BUN SERPL-MCNC: 10 MG/DL (ref 8–23)
CALCIUM SERPL-MCNC: 9.4 MG/DL (ref 8.7–10.5)
CHLORIDE SERPL-SCNC: 103 MMOL/L (ref 95–110)
CO2 SERPL-SCNC: 30 MMOL/L (ref 23–29)
CREAT SERPL-MCNC: 0.8 MG/DL (ref 0.5–1.4)
CRP SERPL-MCNC: 0.2 MG/L (ref 0–8.2)
DIFFERENTIAL METHOD: ABNORMAL
EOSINOPHIL # BLD AUTO: 0.4 K/UL (ref 0–0.5)
EOSINOPHIL NFR BLD: 3.2 % (ref 0–8)
ERYTHROCYTE [DISTWIDTH] IN BLOOD BY AUTOMATED COUNT: 13.3 % (ref 11.5–14.5)
ERYTHROCYTE [SEDIMENTATION RATE] IN BLOOD BY WESTERGREN METHOD: <2 MM/HR (ref 0–36)
EST. GFR  (AFRICAN AMERICAN): >60 ML/MIN/1.73 M^2
EST. GFR  (NON AFRICAN AMERICAN): >60 ML/MIN/1.73 M^2
GLUCOSE SERPL-MCNC: 90 MG/DL (ref 70–110)
HCT VFR BLD AUTO: 40.8 % (ref 37–48.5)
HGB BLD-MCNC: 13.3 G/DL (ref 12–16)
IMM GRANULOCYTES # BLD AUTO: 0.04 K/UL (ref 0–0.04)
IMM GRANULOCYTES NFR BLD AUTO: 0.3 % (ref 0–0.5)
LYMPHOCYTES # BLD AUTO: 3.9 K/UL (ref 1–4.8)
LYMPHOCYTES NFR BLD: 33.7 % (ref 18–48)
MCH RBC QN AUTO: 32.4 PG (ref 27–31)
MCHC RBC AUTO-ENTMCNC: 32.6 G/DL (ref 32–36)
MCV RBC AUTO: 99 FL (ref 82–98)
MONOCYTES # BLD AUTO: 0.8 K/UL (ref 0.3–1)
MONOCYTES NFR BLD: 6.6 % (ref 4–15)
NEUTROPHILS # BLD AUTO: 6.4 K/UL (ref 1.8–7.7)
NEUTROPHILS NFR BLD: 55.3 % (ref 38–73)
NRBC BLD-RTO: 0 /100 WBC
PLATELET # BLD AUTO: 439 K/UL (ref 150–450)
PMV BLD AUTO: 8.5 FL (ref 9.2–12.9)
POTASSIUM SERPL-SCNC: 4.7 MMOL/L (ref 3.5–5.1)
PROT SERPL-MCNC: 6.2 G/DL (ref 6–8.4)
RBC # BLD AUTO: 4.11 M/UL (ref 4–5.4)
SODIUM SERPL-SCNC: 138 MMOL/L (ref 136–145)
WBC # BLD AUTO: 11.55 K/UL (ref 3.9–12.7)

## 2021-07-09 PROCEDURE — 85025 COMPLETE CBC W/AUTO DIFF WBC: CPT | Performed by: STUDENT IN AN ORGANIZED HEALTH CARE EDUCATION/TRAINING PROGRAM

## 2021-07-09 PROCEDURE — 80053 COMPREHEN METABOLIC PANEL: CPT | Performed by: STUDENT IN AN ORGANIZED HEALTH CARE EDUCATION/TRAINING PROGRAM

## 2021-07-09 PROCEDURE — 36415 COLL VENOUS BLD VENIPUNCTURE: CPT | Mod: PO | Performed by: STUDENT IN AN ORGANIZED HEALTH CARE EDUCATION/TRAINING PROGRAM

## 2021-07-09 PROCEDURE — 86140 C-REACTIVE PROTEIN: CPT | Performed by: STUDENT IN AN ORGANIZED HEALTH CARE EDUCATION/TRAINING PROGRAM

## 2021-07-09 PROCEDURE — 85652 RBC SED RATE AUTOMATED: CPT | Performed by: STUDENT IN AN ORGANIZED HEALTH CARE EDUCATION/TRAINING PROGRAM

## 2021-07-14 ENCOUNTER — TELEPHONE (OUTPATIENT)
Dept: GENETICS | Facility: CLINIC | Age: 66
End: 2021-07-14

## 2021-07-16 ENCOUNTER — TELEPHONE (OUTPATIENT)
Dept: INTERNAL MEDICINE | Facility: CLINIC | Age: 66
End: 2021-07-16

## 2021-07-16 DIAGNOSIS — Z00.00 ANNUAL PHYSICAL EXAM: Primary | ICD-10-CM

## 2021-07-19 DIAGNOSIS — L40.50 PSORIATIC ARTHRITIS: ICD-10-CM

## 2021-07-19 RX ORDER — METHOTREXATE 2.5 MG/1
10 TABLET ORAL
Qty: 48 TABLET | Refills: 0 | Status: SHIPPED | OUTPATIENT
Start: 2021-07-19 | End: 2021-08-17 | Stop reason: SDUPTHER

## 2021-08-10 ENCOUNTER — LAB VISIT (OUTPATIENT)
Dept: LAB | Facility: HOSPITAL | Age: 66
End: 2021-08-10
Payer: MEDICARE

## 2021-08-10 DIAGNOSIS — L40.50 PSORIATIC ARTHRITIS: ICD-10-CM

## 2021-08-10 LAB
ALBUMIN SERPL BCP-MCNC: 3.6 G/DL (ref 3.5–5.2)
ALP SERPL-CCNC: 60 U/L (ref 55–135)
ALT SERPL W/O P-5'-P-CCNC: 23 U/L (ref 10–44)
ANION GAP SERPL CALC-SCNC: 6 MMOL/L (ref 8–16)
AST SERPL-CCNC: 30 U/L (ref 10–40)
BASOPHILS # BLD AUTO: 0.07 K/UL (ref 0–0.2)
BASOPHILS NFR BLD: 0.6 % (ref 0–1.9)
BILIRUB SERPL-MCNC: 0.2 MG/DL (ref 0.1–1)
BUN SERPL-MCNC: 10 MG/DL (ref 8–23)
CALCIUM SERPL-MCNC: 9.6 MG/DL (ref 8.7–10.5)
CHLORIDE SERPL-SCNC: 99 MMOL/L (ref 95–110)
CO2 SERPL-SCNC: 27 MMOL/L (ref 23–29)
CREAT SERPL-MCNC: 0.8 MG/DL (ref 0.5–1.4)
DIFFERENTIAL METHOD: ABNORMAL
EOSINOPHIL # BLD AUTO: 0.3 K/UL (ref 0–0.5)
EOSINOPHIL NFR BLD: 2.4 % (ref 0–8)
ERYTHROCYTE [DISTWIDTH] IN BLOOD BY AUTOMATED COUNT: 13.7 % (ref 11.5–14.5)
EST. GFR  (AFRICAN AMERICAN): >60 ML/MIN/1.73 M^2
EST. GFR  (NON AFRICAN AMERICAN): >60 ML/MIN/1.73 M^2
GLUCOSE SERPL-MCNC: 97 MG/DL (ref 70–110)
HCT VFR BLD AUTO: 38.3 % (ref 37–48.5)
HGB BLD-MCNC: 12.7 G/DL (ref 12–16)
IMM GRANULOCYTES # BLD AUTO: 0.03 K/UL (ref 0–0.04)
IMM GRANULOCYTES NFR BLD AUTO: 0.3 % (ref 0–0.5)
LYMPHOCYTES # BLD AUTO: 2.1 K/UL (ref 1–4.8)
LYMPHOCYTES NFR BLD: 18.5 % (ref 18–48)
MCH RBC QN AUTO: 32.4 PG (ref 27–31)
MCHC RBC AUTO-ENTMCNC: 33.2 G/DL (ref 32–36)
MCV RBC AUTO: 98 FL (ref 82–98)
MONOCYTES # BLD AUTO: 1 K/UL (ref 0.3–1)
MONOCYTES NFR BLD: 8.9 % (ref 4–15)
NEUTROPHILS # BLD AUTO: 7.9 K/UL (ref 1.8–7.7)
NEUTROPHILS NFR BLD: 69.3 % (ref 38–73)
NRBC BLD-RTO: 0 /100 WBC
PLATELET # BLD AUTO: 374 K/UL (ref 150–450)
PMV BLD AUTO: 8.7 FL (ref 9.2–12.9)
POTASSIUM SERPL-SCNC: 4.6 MMOL/L (ref 3.5–5.1)
PROT SERPL-MCNC: 5.9 G/DL (ref 6–8.4)
RBC # BLD AUTO: 3.92 M/UL (ref 4–5.4)
SODIUM SERPL-SCNC: 132 MMOL/L (ref 136–145)
WBC # BLD AUTO: 11.37 K/UL (ref 3.9–12.7)

## 2021-08-10 PROCEDURE — 36415 COLL VENOUS BLD VENIPUNCTURE: CPT | Mod: PO | Performed by: STUDENT IN AN ORGANIZED HEALTH CARE EDUCATION/TRAINING PROGRAM

## 2021-08-10 PROCEDURE — 80053 COMPREHEN METABOLIC PANEL: CPT | Performed by: STUDENT IN AN ORGANIZED HEALTH CARE EDUCATION/TRAINING PROGRAM

## 2021-08-10 PROCEDURE — 85025 COMPLETE CBC W/AUTO DIFF WBC: CPT | Performed by: STUDENT IN AN ORGANIZED HEALTH CARE EDUCATION/TRAINING PROGRAM

## 2021-08-16 ENCOUNTER — TELEPHONE (OUTPATIENT)
Dept: RHEUMATOLOGY | Facility: CLINIC | Age: 66
End: 2021-08-16

## 2021-08-16 ENCOUNTER — TELEPHONE (OUTPATIENT)
Dept: NEPHROLOGY | Facility: CLINIC | Age: 66
End: 2021-08-16

## 2021-08-17 ENCOUNTER — TELEPHONE (OUTPATIENT)
Dept: RHEUMATOLOGY | Facility: CLINIC | Age: 66
End: 2021-08-17

## 2021-08-17 DIAGNOSIS — J30.9 ALLERGIC RHINITIS, UNSPECIFIED SEASONALITY, UNSPECIFIED TRIGGER: ICD-10-CM

## 2021-08-17 DIAGNOSIS — L40.50 PSORIATIC ARTHRITIS: ICD-10-CM

## 2021-08-17 RX ORDER — METHOTREXATE 2.5 MG/1
10 TABLET ORAL
Qty: 48 TABLET | Refills: 0 | Status: SHIPPED | OUTPATIENT
Start: 2021-08-17 | End: 2021-10-25 | Stop reason: SDUPTHER

## 2021-08-17 RX ORDER — FLUTICASONE PROPIONATE 50 MCG
SPRAY, SUSPENSION (ML) NASAL
Qty: 48 G | Refills: 0 | Status: SHIPPED | OUTPATIENT
Start: 2021-08-17 | End: 2022-02-07

## 2021-08-25 ENCOUNTER — LAB VISIT (OUTPATIENT)
Dept: LAB | Facility: HOSPITAL | Age: 66
End: 2021-08-25
Attending: STUDENT IN AN ORGANIZED HEALTH CARE EDUCATION/TRAINING PROGRAM
Payer: MEDICARE

## 2021-08-25 DIAGNOSIS — L40.50 PSORIATIC ARTHRITIS: ICD-10-CM

## 2021-08-25 DIAGNOSIS — M79.89 LEFT LEG SWELLING: ICD-10-CM

## 2021-08-25 LAB
ALBUMIN SERPL BCP-MCNC: 3.6 G/DL (ref 3.5–5.2)
ALP SERPL-CCNC: 65 U/L (ref 55–135)
ALT SERPL W/O P-5'-P-CCNC: 23 U/L (ref 10–44)
ANION GAP SERPL CALC-SCNC: 8 MMOL/L (ref 8–16)
AST SERPL-CCNC: 25 U/L (ref 10–40)
BASOPHILS # BLD AUTO: 0.09 K/UL (ref 0–0.2)
BASOPHILS NFR BLD: 1 % (ref 0–1.9)
BILIRUB SERPL-MCNC: 0.3 MG/DL (ref 0.1–1)
BUN SERPL-MCNC: 8 MG/DL (ref 8–23)
CALCIUM SERPL-MCNC: 10.1 MG/DL (ref 8.7–10.5)
CHLORIDE SERPL-SCNC: 100 MMOL/L (ref 95–110)
CO2 SERPL-SCNC: 27 MMOL/L (ref 23–29)
CREAT SERPL-MCNC: 0.8 MG/DL (ref 0.5–1.4)
CRP SERPL-MCNC: <0.3 MG/L (ref 0–8.2)
DIFFERENTIAL METHOD: ABNORMAL
EOSINOPHIL # BLD AUTO: 0.3 K/UL (ref 0–0.5)
EOSINOPHIL NFR BLD: 2.9 % (ref 0–8)
ERYTHROCYTE [DISTWIDTH] IN BLOOD BY AUTOMATED COUNT: 13.4 % (ref 11.5–14.5)
ERYTHROCYTE [SEDIMENTATION RATE] IN BLOOD BY WESTERGREN METHOD: 3 MM/HR (ref 0–36)
EST. GFR  (AFRICAN AMERICAN): >60 ML/MIN/1.73 M^2
EST. GFR  (NON AFRICAN AMERICAN): >60 ML/MIN/1.73 M^2
GLUCOSE SERPL-MCNC: 110 MG/DL (ref 70–110)
HCT VFR BLD AUTO: 37.7 % (ref 37–48.5)
HGB BLD-MCNC: 12.5 G/DL (ref 12–16)
IMM GRANULOCYTES # BLD AUTO: 0.02 K/UL (ref 0–0.04)
IMM GRANULOCYTES NFR BLD AUTO: 0.2 % (ref 0–0.5)
LYMPHOCYTES # BLD AUTO: 3.8 K/UL (ref 1–4.8)
LYMPHOCYTES NFR BLD: 43 % (ref 18–48)
MCH RBC QN AUTO: 31.7 PG (ref 27–31)
MCHC RBC AUTO-ENTMCNC: 33.2 G/DL (ref 32–36)
MCV RBC AUTO: 96 FL (ref 82–98)
MONOCYTES # BLD AUTO: 0.9 K/UL (ref 0.3–1)
MONOCYTES NFR BLD: 9.6 % (ref 4–15)
NEUTROPHILS # BLD AUTO: 3.8 K/UL (ref 1.8–7.7)
NEUTROPHILS NFR BLD: 43.3 % (ref 38–73)
NRBC BLD-RTO: 0 /100 WBC
PLATELET # BLD AUTO: 410 K/UL (ref 150–450)
PMV BLD AUTO: 8.4 FL (ref 9.2–12.9)
POTASSIUM SERPL-SCNC: 4.1 MMOL/L (ref 3.5–5.1)
PROT SERPL-MCNC: 6 G/DL (ref 6–8.4)
RBC # BLD AUTO: 3.94 M/UL (ref 4–5.4)
SODIUM SERPL-SCNC: 135 MMOL/L (ref 136–145)
WBC # BLD AUTO: 8.88 K/UL (ref 3.9–12.7)

## 2021-08-25 PROCEDURE — 86140 C-REACTIVE PROTEIN: CPT | Performed by: STUDENT IN AN ORGANIZED HEALTH CARE EDUCATION/TRAINING PROGRAM

## 2021-08-25 PROCEDURE — 85652 RBC SED RATE AUTOMATED: CPT | Performed by: STUDENT IN AN ORGANIZED HEALTH CARE EDUCATION/TRAINING PROGRAM

## 2021-08-25 PROCEDURE — 85025 COMPLETE CBC W/AUTO DIFF WBC: CPT | Performed by: STUDENT IN AN ORGANIZED HEALTH CARE EDUCATION/TRAINING PROGRAM

## 2021-08-25 PROCEDURE — 80053 COMPREHEN METABOLIC PANEL: CPT | Performed by: STUDENT IN AN ORGANIZED HEALTH CARE EDUCATION/TRAINING PROGRAM

## 2021-08-25 PROCEDURE — 36415 COLL VENOUS BLD VENIPUNCTURE: CPT | Mod: PO | Performed by: STUDENT IN AN ORGANIZED HEALTH CARE EDUCATION/TRAINING PROGRAM

## 2021-08-26 ENCOUNTER — TELEPHONE (OUTPATIENT)
Dept: RHEUMATOLOGY | Facility: CLINIC | Age: 66
End: 2021-08-26

## 2021-08-27 ENCOUNTER — PATIENT MESSAGE (OUTPATIENT)
Dept: RHEUMATOLOGY | Facility: CLINIC | Age: 66
End: 2021-08-27

## 2021-08-27 ENCOUNTER — PATIENT OUTREACH (OUTPATIENT)
Dept: ADMINISTRATIVE | Facility: OTHER | Age: 66
End: 2021-08-27

## 2021-09-05 ENCOUNTER — NURSE TRIAGE (OUTPATIENT)
Dept: ADMINISTRATIVE | Facility: CLINIC | Age: 66
End: 2021-09-05

## 2021-09-10 ENCOUNTER — LAB VISIT (OUTPATIENT)
Dept: LAB | Facility: HOSPITAL | Age: 66
End: 2021-09-10
Attending: STUDENT IN AN ORGANIZED HEALTH CARE EDUCATION/TRAINING PROGRAM
Payer: MEDICARE

## 2021-09-10 DIAGNOSIS — L40.50 PSORIATIC ARTHRITIS: ICD-10-CM

## 2021-09-10 LAB
ALBUMIN SERPL BCP-MCNC: 3.7 G/DL (ref 3.5–5.2)
ALP SERPL-CCNC: 66 U/L (ref 55–135)
ALT SERPL W/O P-5'-P-CCNC: 23 U/L (ref 10–44)
ANION GAP SERPL CALC-SCNC: 11 MMOL/L (ref 8–16)
AST SERPL-CCNC: 27 U/L (ref 10–40)
BASOPHILS # BLD AUTO: 0.07 K/UL (ref 0–0.2)
BASOPHILS NFR BLD: 0.8 % (ref 0–1.9)
BILIRUB SERPL-MCNC: 0.2 MG/DL (ref 0.1–1)
BUN SERPL-MCNC: 4 MG/DL (ref 8–23)
CALCIUM SERPL-MCNC: 9.1 MG/DL (ref 8.7–10.5)
CHLORIDE SERPL-SCNC: 101 MMOL/L (ref 95–110)
CO2 SERPL-SCNC: 24 MMOL/L (ref 23–29)
CREAT SERPL-MCNC: 0.8 MG/DL (ref 0.5–1.4)
DIFFERENTIAL METHOD: ABNORMAL
EOSINOPHIL # BLD AUTO: 0.3 K/UL (ref 0–0.5)
EOSINOPHIL NFR BLD: 3.9 % (ref 0–8)
ERYTHROCYTE [DISTWIDTH] IN BLOOD BY AUTOMATED COUNT: 14.1 % (ref 11.5–14.5)
EST. GFR  (AFRICAN AMERICAN): >60 ML/MIN/1.73 M^2
EST. GFR  (NON AFRICAN AMERICAN): >60 ML/MIN/1.73 M^2
GLUCOSE SERPL-MCNC: 127 MG/DL (ref 70–110)
HCT VFR BLD AUTO: 37.4 % (ref 37–48.5)
HGB BLD-MCNC: 12.8 G/DL (ref 12–16)
IMM GRANULOCYTES # BLD AUTO: 0.03 K/UL (ref 0–0.04)
IMM GRANULOCYTES NFR BLD AUTO: 0.4 % (ref 0–0.5)
LYMPHOCYTES # BLD AUTO: 3 K/UL (ref 1–4.8)
LYMPHOCYTES NFR BLD: 34.9 % (ref 18–48)
MCH RBC QN AUTO: 32.3 PG (ref 27–31)
MCHC RBC AUTO-ENTMCNC: 34.2 G/DL (ref 32–36)
MCV RBC AUTO: 94 FL (ref 82–98)
MONOCYTES # BLD AUTO: 0.9 K/UL (ref 0.3–1)
MONOCYTES NFR BLD: 10.1 % (ref 4–15)
NEUTROPHILS # BLD AUTO: 4.3 K/UL (ref 1.8–7.7)
NEUTROPHILS NFR BLD: 49.9 % (ref 38–73)
NRBC BLD-RTO: 0 /100 WBC
PLATELET # BLD AUTO: 371 K/UL (ref 150–450)
PMV BLD AUTO: 8.1 FL (ref 9.2–12.9)
POTASSIUM SERPL-SCNC: 4.4 MMOL/L (ref 3.5–5.1)
PROT SERPL-MCNC: 6.1 G/DL (ref 6–8.4)
RBC # BLD AUTO: 3.96 M/UL (ref 4–5.4)
SODIUM SERPL-SCNC: 136 MMOL/L (ref 136–145)
WBC # BLD AUTO: 8.52 K/UL (ref 3.9–12.7)

## 2021-09-10 PROCEDURE — 80053 COMPREHEN METABOLIC PANEL: CPT | Performed by: STUDENT IN AN ORGANIZED HEALTH CARE EDUCATION/TRAINING PROGRAM

## 2021-09-10 PROCEDURE — 36415 COLL VENOUS BLD VENIPUNCTURE: CPT | Performed by: STUDENT IN AN ORGANIZED HEALTH CARE EDUCATION/TRAINING PROGRAM

## 2021-09-10 PROCEDURE — 85025 COMPLETE CBC W/AUTO DIFF WBC: CPT | Performed by: STUDENT IN AN ORGANIZED HEALTH CARE EDUCATION/TRAINING PROGRAM

## 2021-09-16 ENCOUNTER — PATIENT MESSAGE (OUTPATIENT)
Dept: RHEUMATOLOGY | Facility: CLINIC | Age: 66
End: 2021-09-16

## 2021-09-17 ENCOUNTER — NURSE TRIAGE (OUTPATIENT)
Dept: ADMINISTRATIVE | Facility: CLINIC | Age: 66
End: 2021-09-17

## 2021-09-21 ENCOUNTER — TELEPHONE (OUTPATIENT)
Dept: RHEUMATOLOGY | Facility: CLINIC | Age: 66
End: 2021-09-21

## 2021-09-21 ENCOUNTER — PATIENT MESSAGE (OUTPATIENT)
Dept: RHEUMATOLOGY | Facility: CLINIC | Age: 66
End: 2021-09-21

## 2021-09-24 ENCOUNTER — IMMUNIZATION (OUTPATIENT)
Dept: INTERNAL MEDICINE | Facility: CLINIC | Age: 66
End: 2021-09-24
Payer: MEDICARE

## 2021-09-24 DIAGNOSIS — Z23 NEED FOR VACCINATION: Primary | ICD-10-CM

## 2021-09-24 PROCEDURE — 0013A COVID-19, MRNA, LNP-S, PF, 100 MCG/0.5 ML DOSE VACCINE: CPT | Mod: PBBFAC | Performed by: INTERNAL MEDICINE

## 2021-09-24 PROCEDURE — 91301 COVID-19, MRNA, LNP-S, PF, 100 MCG/0.5 ML DOSE VACCINE: CPT | Mod: PBBFAC | Performed by: INTERNAL MEDICINE

## 2021-10-01 ENCOUNTER — TELEPHONE (OUTPATIENT)
Dept: RHEUMATOLOGY | Facility: CLINIC | Age: 66
End: 2021-10-01

## 2021-10-01 DIAGNOSIS — I10 ESSENTIAL HYPERTENSION: ICD-10-CM

## 2021-10-01 RX ORDER — ALBUTEROL SULFATE 90 UG/1
AEROSOL, METERED RESPIRATORY (INHALATION)
Qty: 54 G | Refills: 3 | Status: SHIPPED | OUTPATIENT
Start: 2021-10-01 | End: 2022-05-30 | Stop reason: SDUPTHER

## 2021-10-04 RX ORDER — LISINOPRIL 5 MG/1
10 TABLET ORAL DAILY
Qty: 180 TABLET | Refills: 0 | Status: SHIPPED | OUTPATIENT
Start: 2021-10-04 | End: 2022-05-30 | Stop reason: SDUPTHER

## 2021-10-05 ENCOUNTER — TELEPHONE (OUTPATIENT)
Dept: PSYCHIATRY | Facility: CLINIC | Age: 66
End: 2021-10-05

## 2021-10-18 ENCOUNTER — PATIENT MESSAGE (OUTPATIENT)
Dept: RHEUMATOLOGY | Facility: CLINIC | Age: 66
End: 2021-10-18
Payer: MEDICARE

## 2021-10-18 ENCOUNTER — TELEPHONE (OUTPATIENT)
Dept: RHEUMATOLOGY | Facility: CLINIC | Age: 66
End: 2021-10-18

## 2021-10-25 ENCOUNTER — LAB VISIT (OUTPATIENT)
Dept: LAB | Facility: HOSPITAL | Age: 66
End: 2021-10-25
Attending: INTERNAL MEDICINE
Payer: MEDICARE

## 2021-10-25 ENCOUNTER — OFFICE VISIT (OUTPATIENT)
Dept: RHEUMATOLOGY | Facility: CLINIC | Age: 66
End: 2021-10-25
Payer: MEDICARE

## 2021-10-25 VITALS
WEIGHT: 108 LBS | HEART RATE: 66 BPM | SYSTOLIC BLOOD PRESSURE: 108 MMHG | BODY MASS INDEX: 19.88 KG/M2 | HEIGHT: 62 IN | DIASTOLIC BLOOD PRESSURE: 62 MMHG

## 2021-10-25 DIAGNOSIS — L40.50 PSORIATIC ARTHRITIS: ICD-10-CM

## 2021-10-25 DIAGNOSIS — L40.50 PSA (PSORIATIC ARTHRITIS): ICD-10-CM

## 2021-10-25 DIAGNOSIS — M81.0 OSTEOPOROSIS, UNSPECIFIED OSTEOPOROSIS TYPE, UNSPECIFIED PATHOLOGICAL FRACTURE PRESENCE: Primary | ICD-10-CM

## 2021-10-25 DIAGNOSIS — M25.561 CHRONIC PAIN OF RIGHT KNEE: ICD-10-CM

## 2021-10-25 DIAGNOSIS — M81.0 OSTEOPOROSIS, UNSPECIFIED OSTEOPOROSIS TYPE, UNSPECIFIED PATHOLOGICAL FRACTURE PRESENCE: ICD-10-CM

## 2021-10-25 DIAGNOSIS — G89.29 CHRONIC PAIN OF RIGHT KNEE: ICD-10-CM

## 2021-10-25 LAB — 25(OH)D3+25(OH)D2 SERPL-MCNC: 57 NG/ML (ref 30–96)

## 2021-10-25 PROCEDURE — 1159F PR MEDICATION LIST DOCUMENTED IN MEDICAL RECORD: ICD-10-PCS | Mod: CPTII,S$GLB,, | Performed by: INTERNAL MEDICINE

## 2021-10-25 PROCEDURE — 1159F MED LIST DOCD IN RCRD: CPT | Mod: CPTII,S$GLB,, | Performed by: INTERNAL MEDICINE

## 2021-10-25 PROCEDURE — 99213 PR OFFICE/OUTPT VISIT, EST, LEVL III, 20-29 MIN: ICD-10-PCS | Mod: S$GLB,,, | Performed by: INTERNAL MEDICINE

## 2021-10-25 PROCEDURE — 3288F PR FALLS RISK ASSESSMENT DOCUMENTED: ICD-10-PCS | Mod: CPTII,S$GLB,, | Performed by: INTERNAL MEDICINE

## 2021-10-25 PROCEDURE — 3288F FALL RISK ASSESSMENT DOCD: CPT | Mod: CPTII,S$GLB,, | Performed by: INTERNAL MEDICINE

## 2021-10-25 PROCEDURE — 99499 RISK ADDL DX/OHS AUDIT: ICD-10-PCS | Mod: S$GLB,,, | Performed by: INTERNAL MEDICINE

## 2021-10-25 PROCEDURE — 99213 OFFICE O/P EST LOW 20 MIN: CPT | Mod: S$GLB,,, | Performed by: INTERNAL MEDICINE

## 2021-10-25 PROCEDURE — 4010F ACE/ARB THERAPY RXD/TAKEN: CPT | Mod: CPTII,S$GLB,, | Performed by: INTERNAL MEDICINE

## 2021-10-25 PROCEDURE — 3066F PR DOCUMENTATION OF TREATMENT FOR NEPHROPATHY: ICD-10-PCS | Mod: CPTII,S$GLB,, | Performed by: INTERNAL MEDICINE

## 2021-10-25 PROCEDURE — 82306 VITAMIN D 25 HYDROXY: CPT | Performed by: INTERNAL MEDICINE

## 2021-10-25 PROCEDURE — 4010F PR ACE/ARB THEARPY RXD/TAKEN: ICD-10-PCS | Mod: CPTII,S$GLB,, | Performed by: INTERNAL MEDICINE

## 2021-10-25 PROCEDURE — 3074F PR MOST RECENT SYSTOLIC BLOOD PRESSURE < 130 MM HG: ICD-10-PCS | Mod: CPTII,S$GLB,, | Performed by: INTERNAL MEDICINE

## 2021-10-25 PROCEDURE — 36415 COLL VENOUS BLD VENIPUNCTURE: CPT | Performed by: INTERNAL MEDICINE

## 2021-10-25 PROCEDURE — 3008F PR BODY MASS INDEX (BMI) DOCUMENTED: ICD-10-PCS | Mod: CPTII,S$GLB,, | Performed by: INTERNAL MEDICINE

## 2021-10-25 PROCEDURE — 3078F DIAST BP <80 MM HG: CPT | Mod: CPTII,S$GLB,, | Performed by: INTERNAL MEDICINE

## 2021-10-25 PROCEDURE — 99999 PR PBB SHADOW E&M-EST. PATIENT-LVL V: CPT | Mod: PBBFAC,,, | Performed by: INTERNAL MEDICINE

## 2021-10-25 PROCEDURE — 3074F SYST BP LT 130 MM HG: CPT | Mod: CPTII,S$GLB,, | Performed by: INTERNAL MEDICINE

## 2021-10-25 PROCEDURE — 3008F BODY MASS INDEX DOCD: CPT | Mod: CPTII,S$GLB,, | Performed by: INTERNAL MEDICINE

## 2021-10-25 PROCEDURE — 1101F PT FALLS ASSESS-DOCD LE1/YR: CPT | Mod: CPTII,S$GLB,, | Performed by: INTERNAL MEDICINE

## 2021-10-25 PROCEDURE — 99999 PR PBB SHADOW E&M-EST. PATIENT-LVL V: ICD-10-PCS | Mod: PBBFAC,,, | Performed by: INTERNAL MEDICINE

## 2021-10-25 PROCEDURE — 1125F PR PAIN SEVERITY QUANTIFIED, PAIN PRESENT: ICD-10-PCS | Mod: CPTII,S$GLB,, | Performed by: INTERNAL MEDICINE

## 2021-10-25 PROCEDURE — 3066F NEPHROPATHY DOC TX: CPT | Mod: CPTII,S$GLB,, | Performed by: INTERNAL MEDICINE

## 2021-10-25 PROCEDURE — 1101F PR PT FALLS ASSESS DOC 0-1 FALLS W/OUT INJ PAST YR: ICD-10-PCS | Mod: CPTII,S$GLB,, | Performed by: INTERNAL MEDICINE

## 2021-10-25 PROCEDURE — 3078F PR MOST RECENT DIASTOLIC BLOOD PRESSURE < 80 MM HG: ICD-10-PCS | Mod: CPTII,S$GLB,, | Performed by: INTERNAL MEDICINE

## 2021-10-25 PROCEDURE — 1125F AMNT PAIN NOTED PAIN PRSNT: CPT | Mod: CPTII,S$GLB,, | Performed by: INTERNAL MEDICINE

## 2021-10-25 PROCEDURE — 99499 UNLISTED E&M SERVICE: CPT | Mod: S$GLB,,, | Performed by: INTERNAL MEDICINE

## 2021-10-25 RX ORDER — METHOTREXATE 2.5 MG/1
10 TABLET ORAL
Qty: 48 TABLET | Refills: 0 | Status: SHIPPED | OUTPATIENT
Start: 2021-10-25 | End: 2022-02-07 | Stop reason: SDUPTHER

## 2021-10-25 RX ORDER — FOLIC ACID 1 MG/1
1 TABLET ORAL DAILY
Qty: 90 TABLET | Refills: 3 | Status: SHIPPED | OUTPATIENT
Start: 2021-10-25 | End: 2022-12-14

## 2021-10-25 RX ORDER — ZINC GLUCONATE 50 MG
50 TABLET ORAL DAILY
COMMUNITY

## 2021-10-25 RX ORDER — DICLOFENAC SODIUM 10 MG/G
2 GEL TOPICAL 4 TIMES DAILY PRN
Qty: 3 EACH | Refills: 3 | Status: SHIPPED | OUTPATIENT
Start: 2021-10-25 | End: 2022-04-25 | Stop reason: CLARIF

## 2021-10-25 RX ORDER — TOFACITINIB 11 MG/1
11 TABLET, FILM COATED, EXTENDED RELEASE ORAL DAILY
Qty: 90 TABLET | Refills: 0 | Status: SHIPPED | OUTPATIENT
Start: 2021-10-25 | End: 2022-02-07

## 2021-10-25 ASSESSMENT — ROUTINE ASSESSMENT OF PATIENT INDEX DATA (RAPID3)
MDHAQ FUNCTION SCORE: 0.6
PAIN SCORE: 7
FATIGUE SCORE: 0
WHEN YOU AWAKENED IN THE MORNING OVER THE LAST WEEK, PLEASE INDICATE THE AMOUNT OF TIME IT TAKES UNTIL YOU ARE AS LIMBER AS YOU WILL BE FOR THE DAY: 1
TOTAL RAPID3 SCORE: 4.67
AM STIFFNESS SCORE: 1, YES
PATIENT GLOBAL ASSESSMENT SCORE: 5
PSYCHOLOGICAL DISTRESS SCORE: 3.3

## 2021-10-27 ENCOUNTER — OFFICE VISIT (OUTPATIENT)
Dept: NEPHROLOGY | Facility: CLINIC | Age: 66
End: 2021-10-27
Payer: MEDICARE

## 2021-10-27 VITALS
WEIGHT: 108.25 LBS | SYSTOLIC BLOOD PRESSURE: 90 MMHG | HEART RATE: 74 BPM | BODY MASS INDEX: 19.92 KG/M2 | OXYGEN SATURATION: 95 % | DIASTOLIC BLOOD PRESSURE: 50 MMHG | HEIGHT: 62 IN

## 2021-10-27 DIAGNOSIS — E87.1 HYPONATREMIA: Primary | ICD-10-CM

## 2021-10-27 PROCEDURE — 3066F NEPHROPATHY DOC TX: CPT | Mod: CPTII,S$GLB,, | Performed by: INTERNAL MEDICINE

## 2021-10-27 PROCEDURE — 4010F ACE/ARB THERAPY RXD/TAKEN: CPT | Mod: CPTII,S$GLB,, | Performed by: INTERNAL MEDICINE

## 2021-10-27 PROCEDURE — 3066F PR DOCUMENTATION OF TREATMENT FOR NEPHROPATHY: ICD-10-PCS | Mod: CPTII,S$GLB,, | Performed by: INTERNAL MEDICINE

## 2021-10-27 PROCEDURE — 3288F PR FALLS RISK ASSESSMENT DOCUMENTED: ICD-10-PCS | Mod: CPTII,S$GLB,, | Performed by: INTERNAL MEDICINE

## 2021-10-27 PROCEDURE — 3008F PR BODY MASS INDEX (BMI) DOCUMENTED: ICD-10-PCS | Mod: CPTII,S$GLB,, | Performed by: INTERNAL MEDICINE

## 2021-10-27 PROCEDURE — 1160F RVW MEDS BY RX/DR IN RCRD: CPT | Mod: CPTII,S$GLB,, | Performed by: INTERNAL MEDICINE

## 2021-10-27 PROCEDURE — 3078F DIAST BP <80 MM HG: CPT | Mod: CPTII,S$GLB,, | Performed by: INTERNAL MEDICINE

## 2021-10-27 PROCEDURE — 3074F SYST BP LT 130 MM HG: CPT | Mod: CPTII,S$GLB,, | Performed by: INTERNAL MEDICINE

## 2021-10-27 PROCEDURE — 99215 OFFICE O/P EST HI 40 MIN: CPT | Mod: S$GLB,,, | Performed by: INTERNAL MEDICINE

## 2021-10-27 PROCEDURE — 1101F PR PT FALLS ASSESS DOC 0-1 FALLS W/OUT INJ PAST YR: ICD-10-PCS | Mod: CPTII,S$GLB,, | Performed by: INTERNAL MEDICINE

## 2021-10-27 PROCEDURE — 1160F PR REVIEW ALL MEDS BY PRESCRIBER/CLIN PHARMACIST DOCUMENTED: ICD-10-PCS | Mod: CPTII,S$GLB,, | Performed by: INTERNAL MEDICINE

## 2021-10-27 PROCEDURE — 3288F FALL RISK ASSESSMENT DOCD: CPT | Mod: CPTII,S$GLB,, | Performed by: INTERNAL MEDICINE

## 2021-10-27 PROCEDURE — 1126F PR PAIN SEVERITY QUANTIFIED, NO PAIN PRESENT: ICD-10-PCS | Mod: CPTII,S$GLB,, | Performed by: INTERNAL MEDICINE

## 2021-10-27 PROCEDURE — 1126F AMNT PAIN NOTED NONE PRSNT: CPT | Mod: CPTII,S$GLB,, | Performed by: INTERNAL MEDICINE

## 2021-10-27 PROCEDURE — 99999 PR PBB SHADOW E&M-EST. PATIENT-LVL V: CPT | Mod: PBBFAC,,, | Performed by: INTERNAL MEDICINE

## 2021-10-27 PROCEDURE — 1101F PT FALLS ASSESS-DOCD LE1/YR: CPT | Mod: CPTII,S$GLB,, | Performed by: INTERNAL MEDICINE

## 2021-10-27 PROCEDURE — 99999 PR PBB SHADOW E&M-EST. PATIENT-LVL V: ICD-10-PCS | Mod: PBBFAC,,, | Performed by: INTERNAL MEDICINE

## 2021-10-27 PROCEDURE — 1159F MED LIST DOCD IN RCRD: CPT | Mod: CPTII,S$GLB,, | Performed by: INTERNAL MEDICINE

## 2021-10-27 PROCEDURE — 4010F PR ACE/ARB THEARPY RXD/TAKEN: ICD-10-PCS | Mod: CPTII,S$GLB,, | Performed by: INTERNAL MEDICINE

## 2021-10-27 PROCEDURE — 99215 PR OFFICE/OUTPT VISIT, EST, LEVL V, 40-54 MIN: ICD-10-PCS | Mod: S$GLB,,, | Performed by: INTERNAL MEDICINE

## 2021-10-27 PROCEDURE — 3074F PR MOST RECENT SYSTOLIC BLOOD PRESSURE < 130 MM HG: ICD-10-PCS | Mod: CPTII,S$GLB,, | Performed by: INTERNAL MEDICINE

## 2021-10-27 PROCEDURE — 1159F PR MEDICATION LIST DOCUMENTED IN MEDICAL RECORD: ICD-10-PCS | Mod: CPTII,S$GLB,, | Performed by: INTERNAL MEDICINE

## 2021-10-27 PROCEDURE — 3078F PR MOST RECENT DIASTOLIC BLOOD PRESSURE < 80 MM HG: ICD-10-PCS | Mod: CPTII,S$GLB,, | Performed by: INTERNAL MEDICINE

## 2021-10-27 PROCEDURE — 3008F BODY MASS INDEX DOCD: CPT | Mod: CPTII,S$GLB,, | Performed by: INTERNAL MEDICINE

## 2021-10-28 ENCOUNTER — LAB VISIT (OUTPATIENT)
Dept: LAB | Facility: HOSPITAL | Age: 66
End: 2021-10-28
Attending: INTERNAL MEDICINE
Payer: MEDICARE

## 2021-10-28 DIAGNOSIS — E87.1 HYPONATREMIA: ICD-10-CM

## 2021-10-28 LAB
ANION GAP SERPL CALC-SCNC: 9 MMOL/L (ref 8–16)
BUN SERPL-MCNC: 7 MG/DL (ref 8–23)
CALCIUM SERPL-MCNC: 9.9 MG/DL (ref 8.7–10.5)
CHLORIDE SERPL-SCNC: 101 MMOL/L (ref 95–110)
CO2 SERPL-SCNC: 30 MMOL/L (ref 23–29)
CORTIS SERPL-MCNC: 12.7 UG/DL
CREAT SERPL-MCNC: 0.8 MG/DL (ref 0.5–1.4)
EST. GFR  (AFRICAN AMERICAN): >60 ML/MIN/1.73 M^2
EST. GFR  (NON AFRICAN AMERICAN): >60 ML/MIN/1.73 M^2
GLUCOSE SERPL-MCNC: 123 MG/DL (ref 70–110)
OSMOLALITY SERPL: 294 MOSM/KG (ref 275–295)
POTASSIUM SERPL-SCNC: 4.8 MMOL/L (ref 3.5–5.1)
SODIUM SERPL-SCNC: 140 MMOL/L (ref 136–145)

## 2021-10-28 PROCEDURE — 36415 COLL VENOUS BLD VENIPUNCTURE: CPT | Mod: PO | Performed by: INTERNAL MEDICINE

## 2021-10-28 PROCEDURE — 83930 ASSAY OF BLOOD OSMOLALITY: CPT | Performed by: INTERNAL MEDICINE

## 2021-10-28 PROCEDURE — 82533 TOTAL CORTISOL: CPT | Performed by: INTERNAL MEDICINE

## 2021-10-28 PROCEDURE — 80048 BASIC METABOLIC PNL TOTAL CA: CPT | Performed by: INTERNAL MEDICINE

## 2021-11-09 ENCOUNTER — PATIENT MESSAGE (OUTPATIENT)
Dept: PSYCHIATRY | Facility: CLINIC | Age: 66
End: 2021-11-09
Payer: MEDICARE

## 2021-11-09 ENCOUNTER — OFFICE VISIT (OUTPATIENT)
Dept: PSYCHIATRY | Facility: CLINIC | Age: 66
End: 2021-11-09
Payer: MEDICARE

## 2021-11-09 DIAGNOSIS — R41.3 MEMORY LOSS: ICD-10-CM

## 2021-11-09 DIAGNOSIS — F32.A DEPRESSION, UNSPECIFIED DEPRESSION TYPE: ICD-10-CM

## 2021-11-09 DIAGNOSIS — F41.9 ANXIETY: ICD-10-CM

## 2021-11-09 DIAGNOSIS — G25.9 EXTRAPYRAMIDAL SYNDROME: ICD-10-CM

## 2021-11-09 DIAGNOSIS — T88.7XXA MEDICATION SIDE EFFECT: ICD-10-CM

## 2021-11-09 DIAGNOSIS — F20.0 PARANOID SCHIZOPHRENIA: Primary | ICD-10-CM

## 2021-11-09 PROCEDURE — 99214 OFFICE O/P EST MOD 30 MIN: CPT | Mod: 95,,, | Performed by: INTERNAL MEDICINE

## 2021-11-09 PROCEDURE — 1159F MED LIST DOCD IN RCRD: CPT | Mod: CPTII,95,, | Performed by: INTERNAL MEDICINE

## 2021-11-09 PROCEDURE — 99214 PR OFFICE/OUTPT VISIT, EST, LEVL IV, 30-39 MIN: ICD-10-PCS | Mod: 95,,, | Performed by: INTERNAL MEDICINE

## 2021-11-09 PROCEDURE — 3066F PR DOCUMENTATION OF TREATMENT FOR NEPHROPATHY: ICD-10-PCS | Mod: CPTII,95,, | Performed by: INTERNAL MEDICINE

## 2021-11-09 PROCEDURE — 4010F PR ACE/ARB THEARPY RXD/TAKEN: ICD-10-PCS | Mod: CPTII,95,, | Performed by: INTERNAL MEDICINE

## 2021-11-09 PROCEDURE — 1159F PR MEDICATION LIST DOCUMENTED IN MEDICAL RECORD: ICD-10-PCS | Mod: CPTII,95,, | Performed by: INTERNAL MEDICINE

## 2021-11-09 PROCEDURE — 1160F RVW MEDS BY RX/DR IN RCRD: CPT | Mod: CPTII,95,, | Performed by: INTERNAL MEDICINE

## 2021-11-09 PROCEDURE — 1160F PR REVIEW ALL MEDS BY PRESCRIBER/CLIN PHARMACIST DOCUMENTED: ICD-10-PCS | Mod: CPTII,95,, | Performed by: INTERNAL MEDICINE

## 2021-11-09 PROCEDURE — 3066F NEPHROPATHY DOC TX: CPT | Mod: CPTII,95,, | Performed by: INTERNAL MEDICINE

## 2021-11-09 PROCEDURE — 4010F ACE/ARB THERAPY RXD/TAKEN: CPT | Mod: CPTII,95,, | Performed by: INTERNAL MEDICINE

## 2021-11-09 RX ORDER — RISPERIDONE 3 MG/1
3 TABLET ORAL NIGHTLY
Qty: 90 TABLET | Refills: 3 | Status: SHIPPED | OUTPATIENT
Start: 2021-11-09 | End: 2021-11-19 | Stop reason: SDUPTHER

## 2021-11-09 RX ORDER — BENZTROPINE MESYLATE 0.5 MG/1
0.5 TABLET ORAL 2 TIMES DAILY
Qty: 180 TABLET | Refills: 3 | Status: SHIPPED | OUTPATIENT
Start: 2021-11-09 | End: 2023-02-24 | Stop reason: SDUPTHER

## 2021-11-09 RX ORDER — RISPERIDONE 2 MG/1
2 TABLET ORAL EVERY MORNING
Qty: 90 TABLET | Refills: 3 | Status: SHIPPED | OUTPATIENT
Start: 2021-11-09 | End: 2022-10-17

## 2021-11-16 ENCOUNTER — PATIENT OUTREACH (OUTPATIENT)
Dept: ADMINISTRATIVE | Facility: OTHER | Age: 66
End: 2021-11-16
Payer: MEDICARE

## 2021-11-16 DIAGNOSIS — Z12.31 ENCOUNTER FOR SCREENING MAMMOGRAM FOR MALIGNANT NEOPLASM OF BREAST: Primary | ICD-10-CM

## 2021-11-17 ENCOUNTER — OFFICE VISIT (OUTPATIENT)
Dept: CARDIOLOGY | Facility: CLINIC | Age: 66
End: 2021-11-17
Payer: MEDICARE

## 2021-11-17 VITALS
HEART RATE: 78 BPM | DIASTOLIC BLOOD PRESSURE: 58 MMHG | SYSTOLIC BLOOD PRESSURE: 110 MMHG | OXYGEN SATURATION: 98 % | WEIGHT: 107.13 LBS | HEIGHT: 62 IN | BODY MASS INDEX: 19.71 KG/M2

## 2021-11-17 DIAGNOSIS — I10 ESSENTIAL HYPERTENSION: Primary | Chronic | ICD-10-CM

## 2021-11-17 DIAGNOSIS — I48.0 PAF (PAROXYSMAL ATRIAL FIBRILLATION): ICD-10-CM

## 2021-11-17 DIAGNOSIS — I77.9 BILATERAL CAROTID ARTERY DISEASE, UNSPECIFIED TYPE: ICD-10-CM

## 2021-11-17 DIAGNOSIS — E78.5 HYPERLIPIDEMIA, UNSPECIFIED HYPERLIPIDEMIA TYPE: ICD-10-CM

## 2021-11-17 PROCEDURE — 1159F MED LIST DOCD IN RCRD: CPT | Mod: CPTII,S$GLB,, | Performed by: INTERNAL MEDICINE

## 2021-11-17 PROCEDURE — 3008F PR BODY MASS INDEX (BMI) DOCUMENTED: ICD-10-PCS | Mod: CPTII,S$GLB,, | Performed by: INTERNAL MEDICINE

## 2021-11-17 PROCEDURE — 3066F PR DOCUMENTATION OF TREATMENT FOR NEPHROPATHY: ICD-10-PCS | Mod: CPTII,S$GLB,, | Performed by: INTERNAL MEDICINE

## 2021-11-17 PROCEDURE — 1159F PR MEDICATION LIST DOCUMENTED IN MEDICAL RECORD: ICD-10-PCS | Mod: CPTII,S$GLB,, | Performed by: INTERNAL MEDICINE

## 2021-11-17 PROCEDURE — 3074F SYST BP LT 130 MM HG: CPT | Mod: CPTII,S$GLB,, | Performed by: INTERNAL MEDICINE

## 2021-11-17 PROCEDURE — 3066F NEPHROPATHY DOC TX: CPT | Mod: CPTII,S$GLB,, | Performed by: INTERNAL MEDICINE

## 2021-11-17 PROCEDURE — 99214 OFFICE O/P EST MOD 30 MIN: CPT | Mod: S$GLB,,, | Performed by: INTERNAL MEDICINE

## 2021-11-17 PROCEDURE — 1125F PR PAIN SEVERITY QUANTIFIED, PAIN PRESENT: ICD-10-PCS | Mod: CPTII,S$GLB,, | Performed by: INTERNAL MEDICINE

## 2021-11-17 PROCEDURE — 3078F PR MOST RECENT DIASTOLIC BLOOD PRESSURE < 80 MM HG: ICD-10-PCS | Mod: CPTII,S$GLB,, | Performed by: INTERNAL MEDICINE

## 2021-11-17 PROCEDURE — 99999 PR PBB SHADOW E&M-EST. PATIENT-LVL IV: ICD-10-PCS | Mod: PBBFAC,,, | Performed by: INTERNAL MEDICINE

## 2021-11-17 PROCEDURE — 99999 PR PBB SHADOW E&M-EST. PATIENT-LVL IV: CPT | Mod: PBBFAC,,, | Performed by: INTERNAL MEDICINE

## 2021-11-17 PROCEDURE — 1125F AMNT PAIN NOTED PAIN PRSNT: CPT | Mod: CPTII,S$GLB,, | Performed by: INTERNAL MEDICINE

## 2021-11-17 PROCEDURE — 3074F PR MOST RECENT SYSTOLIC BLOOD PRESSURE < 130 MM HG: ICD-10-PCS | Mod: CPTII,S$GLB,, | Performed by: INTERNAL MEDICINE

## 2021-11-17 PROCEDURE — 99214 PR OFFICE/OUTPT VISIT, EST, LEVL IV, 30-39 MIN: ICD-10-PCS | Mod: S$GLB,,, | Performed by: INTERNAL MEDICINE

## 2021-11-17 PROCEDURE — 4010F ACE/ARB THERAPY RXD/TAKEN: CPT | Mod: CPTII,S$GLB,, | Performed by: INTERNAL MEDICINE

## 2021-11-17 PROCEDURE — 3008F BODY MASS INDEX DOCD: CPT | Mod: CPTII,S$GLB,, | Performed by: INTERNAL MEDICINE

## 2021-11-17 PROCEDURE — 3078F DIAST BP <80 MM HG: CPT | Mod: CPTII,S$GLB,, | Performed by: INTERNAL MEDICINE

## 2021-11-17 PROCEDURE — 4010F PR ACE/ARB THEARPY RXD/TAKEN: ICD-10-PCS | Mod: CPTII,S$GLB,, | Performed by: INTERNAL MEDICINE

## 2021-11-19 ENCOUNTER — OFFICE VISIT (OUTPATIENT)
Dept: PSYCHIATRY | Facility: CLINIC | Age: 66
End: 2021-11-19
Payer: MEDICARE

## 2021-11-19 DIAGNOSIS — G25.9 EXTRAPYRAMIDAL SYNDROME: ICD-10-CM

## 2021-11-19 DIAGNOSIS — G89.4 CHRONIC PAIN SYNDROME: ICD-10-CM

## 2021-11-19 DIAGNOSIS — F20.0 PARANOID SCHIZOPHRENIA: Primary | ICD-10-CM

## 2021-11-19 DIAGNOSIS — F41.9 ANXIETY: ICD-10-CM

## 2021-11-19 DIAGNOSIS — G47.00 INSOMNIA, UNSPECIFIED TYPE: ICD-10-CM

## 2021-11-19 PROCEDURE — 99214 PR OFFICE/OUTPT VISIT, EST, LEVL IV, 30-39 MIN: ICD-10-PCS | Mod: 95,,, | Performed by: INTERNAL MEDICINE

## 2021-11-19 PROCEDURE — 4010F ACE/ARB THERAPY RXD/TAKEN: CPT | Mod: CPTII,95,, | Performed by: INTERNAL MEDICINE

## 2021-11-19 PROCEDURE — 99214 OFFICE O/P EST MOD 30 MIN: CPT | Mod: 95,,, | Performed by: INTERNAL MEDICINE

## 2021-11-19 PROCEDURE — 3066F NEPHROPATHY DOC TX: CPT | Mod: CPTII,95,, | Performed by: INTERNAL MEDICINE

## 2021-11-19 PROCEDURE — 4010F PR ACE/ARB THEARPY RXD/TAKEN: ICD-10-PCS | Mod: CPTII,95,, | Performed by: INTERNAL MEDICINE

## 2021-11-19 PROCEDURE — 3066F PR DOCUMENTATION OF TREATMENT FOR NEPHROPATHY: ICD-10-PCS | Mod: CPTII,95,, | Performed by: INTERNAL MEDICINE

## 2021-11-19 RX ORDER — RISPERIDONE 4 MG/1
4 TABLET ORAL NIGHTLY
Qty: 90 TABLET | Refills: 3 | Status: SHIPPED | OUTPATIENT
Start: 2021-11-19 | End: 2021-12-27

## 2021-11-19 RX ORDER — GABAPENTIN 300 MG/1
300 CAPSULE ORAL NIGHTLY
Qty: 90 CAPSULE | Refills: 3 | Status: SHIPPED | OUTPATIENT
Start: 2021-11-19 | End: 2022-11-19

## 2021-12-03 ENCOUNTER — HOSPITAL ENCOUNTER (EMERGENCY)
Facility: HOSPITAL | Age: 66
Discharge: HOME | End: 2021-12-03
Attending: EMERGENCY MEDICINE
Payer: MEDICARE

## 2021-12-03 ENCOUNTER — TELEPHONE (OUTPATIENT)
Dept: OPHTHALMOLOGY | Facility: CLINIC | Age: 66
End: 2021-12-03
Payer: MEDICARE

## 2021-12-03 VITALS
RESPIRATION RATE: 18 BRPM | SYSTOLIC BLOOD PRESSURE: 142 MMHG | DIASTOLIC BLOOD PRESSURE: 61 MMHG | HEART RATE: 72 BPM | BODY MASS INDEX: 21.01 KG/M2 | TEMPERATURE: 98 F | OXYGEN SATURATION: 98 % | WEIGHT: 107 LBS | HEIGHT: 60 IN

## 2021-12-03 DIAGNOSIS — T26.92XA CHEMICAL INJURY OF LEFT EYE: Primary | ICD-10-CM

## 2021-12-03 PROCEDURE — 25000003 PHARM REV CODE 250: Performed by: EMERGENCY MEDICINE

## 2021-12-03 PROCEDURE — 99283 EMERGENCY DEPT VISIT LOW MDM: CPT

## 2021-12-03 RX ORDER — PROPARACAINE HYDROCHLORIDE 5 MG/ML
1 SOLUTION/ DROPS OPHTHALMIC
Status: COMPLETED | OUTPATIENT
Start: 2021-12-03 | End: 2021-12-03

## 2021-12-03 RX ORDER — KETOROLAC TROMETHAMINE 5 MG/ML
1 SOLUTION OPHTHALMIC EVERY 6 HOURS
Qty: 5 ML | Refills: 0 | Status: SHIPPED | OUTPATIENT
Start: 2021-12-03 | End: 2021-12-13

## 2021-12-03 RX ADMIN — PROPARACAINE HYDROCHLORIDE 1 DROP: 5 SOLUTION/ DROPS OPHTHALMIC at 04:12

## 2021-12-03 RX ADMIN — FLUORESCEIN SODIUM 1 EACH: 1 STRIP OPHTHALMIC at 04:12

## 2021-12-04 ENCOUNTER — HOSPITAL ENCOUNTER (EMERGENCY)
Facility: HOSPITAL | Age: 66
Discharge: HOME | End: 2021-12-04
Attending: EMERGENCY MEDICINE
Payer: MEDICARE

## 2021-12-04 VITALS
TEMPERATURE: 99 F | HEIGHT: 60 IN | BODY MASS INDEX: 20.9 KG/M2 | DIASTOLIC BLOOD PRESSURE: 86 MMHG | OXYGEN SATURATION: 100 % | HEART RATE: 88 BPM | SYSTOLIC BLOOD PRESSURE: 167 MMHG | RESPIRATION RATE: 18 BRPM

## 2021-12-04 DIAGNOSIS — Z01.00 NORMAL EYE EXAM: Primary | ICD-10-CM

## 2021-12-04 PROCEDURE — 99283 EMERGENCY DEPT VISIT LOW MDM: CPT

## 2021-12-04 PROCEDURE — 25000003 PHARM REV CODE 250: Performed by: EMERGENCY MEDICINE

## 2021-12-04 RX ORDER — PROPARACAINE HYDROCHLORIDE 5 MG/ML
1 SOLUTION/ DROPS OPHTHALMIC
Status: COMPLETED | OUTPATIENT
Start: 2021-12-04 | End: 2021-12-04

## 2021-12-04 RX ADMIN — PROPARACAINE HYDROCHLORIDE 1 DROP: 5 SOLUTION/ DROPS OPHTHALMIC at 10:12

## 2021-12-08 ENCOUNTER — TELEPHONE (OUTPATIENT)
Dept: INTERNAL MEDICINE | Facility: CLINIC | Age: 66
End: 2021-12-08
Payer: MEDICARE

## 2021-12-09 ENCOUNTER — TELEPHONE (OUTPATIENT)
Dept: INTERNAL MEDICINE | Facility: CLINIC | Age: 66
End: 2021-12-09
Payer: MEDICARE

## 2021-12-09 ENCOUNTER — OFFICE VISIT (OUTPATIENT)
Dept: OPHTHALMOLOGY | Facility: CLINIC | Age: 66
End: 2021-12-09
Payer: MEDICARE

## 2021-12-09 DIAGNOSIS — T26.92XA ALKALINE CHEMICAL BURN OF LEFT EYE: Primary | ICD-10-CM

## 2021-12-09 DIAGNOSIS — T54.3X1A ALKALINE CHEMICAL BURN OF LEFT EYE: Primary | ICD-10-CM

## 2021-12-09 DIAGNOSIS — H50.811 DUANE'S SYNDROME OF RIGHT EYE: ICD-10-CM

## 2021-12-09 PROCEDURE — 99213 PR OFFICE/OUTPT VISIT, EST, LEVL III, 20-29 MIN: ICD-10-PCS | Mod: S$GLB,,, | Performed by: OPHTHALMOLOGY

## 2021-12-09 PROCEDURE — 2023F DILAT RTA XM W/O RTNOPTHY: CPT | Mod: CPTII,S$GLB,, | Performed by: OPHTHALMOLOGY

## 2021-12-09 PROCEDURE — 92134 CPTRZ OPH DX IMG PST SGM RTA: CPT | Mod: S$GLB,,, | Performed by: OPHTHALMOLOGY

## 2021-12-09 PROCEDURE — 4010F ACE/ARB THERAPY RXD/TAKEN: CPT | Mod: CPTII,S$GLB,, | Performed by: OPHTHALMOLOGY

## 2021-12-09 PROCEDURE — 92134 PR COMPUTERIZED OPHTHALMIC IMAGING RETINA: ICD-10-PCS | Mod: S$GLB,,, | Performed by: OPHTHALMOLOGY

## 2021-12-09 PROCEDURE — 99999 PR PBB SHADOW E&M-EST. PATIENT-LVL IV: ICD-10-PCS | Mod: PBBFAC,,, | Performed by: OPHTHALMOLOGY

## 2021-12-09 PROCEDURE — 99999 PR PBB SHADOW E&M-EST. PATIENT-LVL IV: CPT | Mod: PBBFAC,,, | Performed by: OPHTHALMOLOGY

## 2021-12-09 PROCEDURE — 99213 OFFICE O/P EST LOW 20 MIN: CPT | Mod: S$GLB,,, | Performed by: OPHTHALMOLOGY

## 2021-12-09 PROCEDURE — 4010F PR ACE/ARB THEARPY RXD/TAKEN: ICD-10-PCS | Mod: CPTII,S$GLB,, | Performed by: OPHTHALMOLOGY

## 2021-12-09 PROCEDURE — 2023F PR DILATED RETINAL EXAM W/O EVID OF RETINOPATHY: ICD-10-PCS | Mod: CPTII,S$GLB,, | Performed by: OPHTHALMOLOGY

## 2021-12-09 PROCEDURE — 3066F NEPHROPATHY DOC TX: CPT | Mod: CPTII,S$GLB,, | Performed by: OPHTHALMOLOGY

## 2021-12-09 PROCEDURE — 3066F PR DOCUMENTATION OF TREATMENT FOR NEPHROPATHY: ICD-10-PCS | Mod: CPTII,S$GLB,, | Performed by: OPHTHALMOLOGY

## 2021-12-09 NOTE — TELEPHONE ENCOUNTER
----- Message from Brenton Fields sent at 12/9/2021  1:03 PM CST -----  Contact: self [phone]  Patient is returning a phone call.  Who left a message for the patient: Migdalia Sotelo LPN   Does patient know what this is regarding:    Would you like a call back, or a response through your [facility] portal?:   yes  Comments:      Please call and advise

## 2021-12-10 ENCOUNTER — PATIENT MESSAGE (OUTPATIENT)
Dept: PSYCHIATRY | Facility: CLINIC | Age: 66
End: 2021-12-10
Payer: MEDICARE

## 2021-12-10 ENCOUNTER — TELEPHONE (OUTPATIENT)
Dept: INTERNAL MEDICINE | Facility: CLINIC | Age: 66
End: 2021-12-10
Payer: MEDICARE

## 2021-12-10 NOTE — TELEPHONE ENCOUNTER
----- Message from Lisa Lowry sent at 12/10/2021  4:00 PM CST -----  Contact: [phone]  Pt says she missed a call and would like a return call.

## 2021-12-13 ENCOUNTER — TELEPHONE (OUTPATIENT)
Dept: INTERNAL MEDICINE | Facility: CLINIC | Age: 66
End: 2021-12-13
Payer: MEDICARE

## 2021-12-13 ENCOUNTER — OFFICE VISIT (OUTPATIENT)
Dept: PSYCHIATRY | Facility: CLINIC | Age: 66
End: 2021-12-13
Payer: COMMERCIAL

## 2021-12-13 ENCOUNTER — TELEPHONE (OUTPATIENT)
Dept: RHEUMATOLOGY | Facility: CLINIC | Age: 66
End: 2021-12-13
Payer: MEDICARE

## 2021-12-13 DIAGNOSIS — E87.1 HYPONATREMIA: Primary | ICD-10-CM

## 2021-12-13 DIAGNOSIS — G47.00 INSOMNIA, UNSPECIFIED TYPE: ICD-10-CM

## 2021-12-13 DIAGNOSIS — F20.0 PARANOID SCHIZOPHRENIA: Primary | ICD-10-CM

## 2021-12-13 DIAGNOSIS — R29.818 EXTRAPYRAMIDAL SYMPTOM: ICD-10-CM

## 2021-12-13 DIAGNOSIS — F41.9 ANXIETY: ICD-10-CM

## 2021-12-13 PROCEDURE — 3066F NEPHROPATHY DOC TX: CPT | Mod: CPTII,S$GLB,, | Performed by: INTERNAL MEDICINE

## 2021-12-13 PROCEDURE — 99214 PR OFFICE/OUTPT VISIT, EST, LEVL IV, 30-39 MIN: ICD-10-PCS | Mod: S$GLB,,, | Performed by: INTERNAL MEDICINE

## 2021-12-13 PROCEDURE — 3066F PR DOCUMENTATION OF TREATMENT FOR NEPHROPATHY: ICD-10-PCS | Mod: CPTII,S$GLB,, | Performed by: INTERNAL MEDICINE

## 2021-12-13 PROCEDURE — 99214 OFFICE O/P EST MOD 30 MIN: CPT | Mod: S$GLB,,, | Performed by: INTERNAL MEDICINE

## 2021-12-13 PROCEDURE — 4010F PR ACE/ARB THEARPY RXD/TAKEN: ICD-10-PCS | Mod: CPTII,S$GLB,, | Performed by: INTERNAL MEDICINE

## 2021-12-13 PROCEDURE — 4010F ACE/ARB THERAPY RXD/TAKEN: CPT | Mod: CPTII,S$GLB,, | Performed by: INTERNAL MEDICINE

## 2021-12-13 PROCEDURE — 99999 PR PBB SHADOW E&M-EST. PATIENT-LVL I: ICD-10-PCS | Mod: PBBFAC,,, | Performed by: INTERNAL MEDICINE

## 2021-12-13 PROCEDURE — 99999 PR PBB SHADOW E&M-EST. PATIENT-LVL I: CPT | Mod: PBBFAC,,, | Performed by: INTERNAL MEDICINE

## 2021-12-14 ENCOUNTER — TELEPHONE (OUTPATIENT)
Dept: RHEUMATOLOGY | Facility: CLINIC | Age: 66
End: 2021-12-14
Payer: MEDICARE

## 2021-12-15 ENCOUNTER — PATIENT MESSAGE (OUTPATIENT)
Dept: PSYCHIATRY | Facility: CLINIC | Age: 66
End: 2021-12-15
Payer: MEDICARE

## 2021-12-17 PROBLEM — R29.818 EXTRAPYRAMIDAL SYMPTOM: Status: ACTIVE | Noted: 2021-12-17

## 2021-12-17 PROBLEM — G24.01 TARDIVE DYSKINESIA: Status: ACTIVE | Noted: 2021-12-17

## 2021-12-17 PROBLEM — G24.01 TARDIVE DYSKINESIA: Status: RESOLVED | Noted: 2021-12-17 | Resolved: 2021-12-17

## 2021-12-28 ENCOUNTER — LAB VISIT (OUTPATIENT)
Dept: PRIMARY CARE CLINIC | Facility: OTHER | Age: 66
End: 2021-12-28
Payer: MEDICARE

## 2021-12-28 ENCOUNTER — TELEPHONE (OUTPATIENT)
Dept: NEUROLOGY | Facility: CLINIC | Age: 66
End: 2021-12-28
Payer: MEDICARE

## 2021-12-28 DIAGNOSIS — R05.9 COUGH: ICD-10-CM

## 2021-12-29 PROCEDURE — U0003 INFECTIOUS AGENT DETECTION BY NUCLEIC ACID (DNA OR RNA); SEVERE ACUTE RESPIRATORY SYNDROME CORONAVIRUS 2 (SARS-COV-2) (CORONAVIRUS DISEASE [COVID-19]), AMPLIFIED PROBE TECHNIQUE, MAKING USE OF HIGH THROUGHPUT TECHNOLOGIES AS DESCRIBED BY CMS-2020-01-R: HCPCS | Performed by: INTERNAL MEDICINE

## 2021-12-30 ENCOUNTER — TELEPHONE (OUTPATIENT)
Dept: RHEUMATOLOGY | Facility: CLINIC | Age: 66
End: 2021-12-30
Payer: MEDICARE

## 2021-12-30 ENCOUNTER — TELEPHONE (OUTPATIENT)
Dept: NEUROLOGY | Facility: CLINIC | Age: 66
End: 2021-12-30
Payer: MEDICARE

## 2021-12-30 ENCOUNTER — OFFICE VISIT (OUTPATIENT)
Dept: NEUROLOGY | Facility: CLINIC | Age: 66
End: 2021-12-30
Payer: MEDICARE

## 2021-12-30 VITALS
HEIGHT: 60 IN | DIASTOLIC BLOOD PRESSURE: 66 MMHG | SYSTOLIC BLOOD PRESSURE: 134 MMHG | HEART RATE: 68 BPM | BODY MASS INDEX: 21 KG/M2 | WEIGHT: 106.94 LBS

## 2021-12-30 DIAGNOSIS — G31.84 MILD COGNITIVE IMPAIRMENT WITH MEMORY LOSS: ICD-10-CM

## 2021-12-30 DIAGNOSIS — H50.811 DUANE'S SYNDROME OF RIGHT EYE: Primary | ICD-10-CM

## 2021-12-30 LAB
SARS-COV-2 RNA RESP QL NAA+PROBE: NOT DETECTED
SARS-COV-2- CYCLE NUMBER: NORMAL

## 2021-12-30 PROCEDURE — 99215 PR OFFICE/OUTPT VISIT, EST, LEVL V, 40-54 MIN: ICD-10-PCS | Mod: S$GLB,,, | Performed by: PSYCHIATRY & NEUROLOGY

## 2021-12-30 PROCEDURE — 3078F PR MOST RECENT DIASTOLIC BLOOD PRESSURE < 80 MM HG: ICD-10-PCS | Mod: CPTII,S$GLB,, | Performed by: PSYCHIATRY & NEUROLOGY

## 2021-12-30 PROCEDURE — 1125F PR PAIN SEVERITY QUANTIFIED, PAIN PRESENT: ICD-10-PCS | Mod: CPTII,S$GLB,, | Performed by: PSYCHIATRY & NEUROLOGY

## 2021-12-30 PROCEDURE — 99215 OFFICE O/P EST HI 40 MIN: CPT | Mod: S$GLB,,, | Performed by: PSYCHIATRY & NEUROLOGY

## 2021-12-30 PROCEDURE — 3075F SYST BP GE 130 - 139MM HG: CPT | Mod: CPTII,S$GLB,, | Performed by: PSYCHIATRY & NEUROLOGY

## 2021-12-30 PROCEDURE — 99999 PR PBB SHADOW E&M-EST. PATIENT-LVL III: ICD-10-PCS | Mod: PBBFAC,,, | Performed by: PSYCHIATRY & NEUROLOGY

## 2021-12-30 PROCEDURE — 1101F PR PT FALLS ASSESS DOC 0-1 FALLS W/OUT INJ PAST YR: ICD-10-PCS | Mod: CPTII,S$GLB,, | Performed by: PSYCHIATRY & NEUROLOGY

## 2021-12-30 PROCEDURE — 3288F FALL RISK ASSESSMENT DOCD: CPT | Mod: CPTII,S$GLB,, | Performed by: PSYCHIATRY & NEUROLOGY

## 2021-12-30 PROCEDURE — 1101F PT FALLS ASSESS-DOCD LE1/YR: CPT | Mod: CPTII,S$GLB,, | Performed by: PSYCHIATRY & NEUROLOGY

## 2021-12-30 PROCEDURE — 3066F PR DOCUMENTATION OF TREATMENT FOR NEPHROPATHY: ICD-10-PCS | Mod: CPTII,S$GLB,, | Performed by: PSYCHIATRY & NEUROLOGY

## 2021-12-30 PROCEDURE — 1125F AMNT PAIN NOTED PAIN PRSNT: CPT | Mod: CPTII,S$GLB,, | Performed by: PSYCHIATRY & NEUROLOGY

## 2021-12-30 PROCEDURE — 1160F RVW MEDS BY RX/DR IN RCRD: CPT | Mod: CPTII,S$GLB,, | Performed by: PSYCHIATRY & NEUROLOGY

## 2021-12-30 PROCEDURE — 1159F PR MEDICATION LIST DOCUMENTED IN MEDICAL RECORD: ICD-10-PCS | Mod: CPTII,S$GLB,, | Performed by: PSYCHIATRY & NEUROLOGY

## 2021-12-30 PROCEDURE — 4010F ACE/ARB THERAPY RXD/TAKEN: CPT | Mod: CPTII,S$GLB,, | Performed by: PSYCHIATRY & NEUROLOGY

## 2021-12-30 PROCEDURE — 3008F BODY MASS INDEX DOCD: CPT | Mod: CPTII,S$GLB,, | Performed by: PSYCHIATRY & NEUROLOGY

## 2021-12-30 PROCEDURE — 99999 PR PBB SHADOW E&M-EST. PATIENT-LVL III: CPT | Mod: PBBFAC,,, | Performed by: PSYCHIATRY & NEUROLOGY

## 2021-12-30 PROCEDURE — 3288F PR FALLS RISK ASSESSMENT DOCUMENTED: ICD-10-PCS | Mod: CPTII,S$GLB,, | Performed by: PSYCHIATRY & NEUROLOGY

## 2021-12-30 PROCEDURE — 3075F PR MOST RECENT SYSTOLIC BLOOD PRESS GE 130-139MM HG: ICD-10-PCS | Mod: CPTII,S$GLB,, | Performed by: PSYCHIATRY & NEUROLOGY

## 2021-12-30 PROCEDURE — 3078F DIAST BP <80 MM HG: CPT | Mod: CPTII,S$GLB,, | Performed by: PSYCHIATRY & NEUROLOGY

## 2021-12-30 PROCEDURE — 1160F PR REVIEW ALL MEDS BY PRESCRIBER/CLIN PHARMACIST DOCUMENTED: ICD-10-PCS | Mod: CPTII,S$GLB,, | Performed by: PSYCHIATRY & NEUROLOGY

## 2021-12-30 PROCEDURE — 1159F MED LIST DOCD IN RCRD: CPT | Mod: CPTII,S$GLB,, | Performed by: PSYCHIATRY & NEUROLOGY

## 2021-12-30 PROCEDURE — 3008F PR BODY MASS INDEX (BMI) DOCUMENTED: ICD-10-PCS | Mod: CPTII,S$GLB,, | Performed by: PSYCHIATRY & NEUROLOGY

## 2021-12-30 PROCEDURE — 3066F NEPHROPATHY DOC TX: CPT | Mod: CPTII,S$GLB,, | Performed by: PSYCHIATRY & NEUROLOGY

## 2021-12-30 PROCEDURE — 4010F PR ACE/ARB THEARPY RXD/TAKEN: ICD-10-PCS | Mod: CPTII,S$GLB,, | Performed by: PSYCHIATRY & NEUROLOGY

## 2022-01-05 ENCOUNTER — PATIENT MESSAGE (OUTPATIENT)
Dept: ADMINISTRATIVE | Facility: OTHER | Age: 67
End: 2022-01-05
Payer: MEDICARE

## 2022-01-05 ENCOUNTER — PATIENT OUTREACH (OUTPATIENT)
Dept: ADMINISTRATIVE | Facility: OTHER | Age: 67
End: 2022-01-05
Payer: MEDICARE

## 2022-01-05 NOTE — PROGRESS NOTES
Care Everywhere: updated  Immunization:   Health Maintenance: updated  Media Review: review for outside mammogram and colon cancer report   Legacy Review:   DIS:  Order placed:   Upcoming appts:  EFAX:  Task Tickets:  Referrals:

## 2022-01-07 ENCOUNTER — OFFICE VISIT (OUTPATIENT)
Dept: DERMATOLOGY | Facility: CLINIC | Age: 67
End: 2022-01-07
Payer: MEDICARE

## 2022-01-07 VITALS — BODY MASS INDEX: 20.7 KG/M2 | WEIGHT: 106 LBS

## 2022-01-07 DIAGNOSIS — L81.4 LENTIGINES: ICD-10-CM

## 2022-01-07 DIAGNOSIS — D48.5 NEOPLASM OF UNCERTAIN BEHAVIOR OF SKIN: Primary | ICD-10-CM

## 2022-01-07 DIAGNOSIS — L57.0 ACTINIC KERATOSIS: ICD-10-CM

## 2022-01-07 PROCEDURE — 1126F AMNT PAIN NOTED NONE PRSNT: CPT | Mod: CPTII,S$GLB,, | Performed by: DERMATOLOGY

## 2022-01-07 PROCEDURE — 99214 OFFICE O/P EST MOD 30 MIN: CPT | Mod: 25,S$GLB,, | Performed by: DERMATOLOGY

## 2022-01-07 PROCEDURE — 99214 PR OFFICE/OUTPT VISIT, EST, LEVL IV, 30-39 MIN: ICD-10-PCS | Mod: 25,S$GLB,, | Performed by: DERMATOLOGY

## 2022-01-07 PROCEDURE — 1101F PT FALLS ASSESS-DOCD LE1/YR: CPT | Mod: CPTII,S$GLB,, | Performed by: DERMATOLOGY

## 2022-01-07 PROCEDURE — 11102 PR TANGENTIAL BIOPSY, SKIN, SINGLE LESION: ICD-10-PCS | Mod: S$GLB,,, | Performed by: DERMATOLOGY

## 2022-01-07 PROCEDURE — 3288F PR FALLS RISK ASSESSMENT DOCUMENTED: ICD-10-PCS | Mod: CPTII,S$GLB,, | Performed by: DERMATOLOGY

## 2022-01-07 PROCEDURE — 17000 PR DESTRUCTION(LASER SURGERY,CRYOSURGERY,CHEMOSURGERY),PREMALIGNANT LESIONS,FIRST LESION: ICD-10-PCS | Mod: XS,S$GLB,, | Performed by: DERMATOLOGY

## 2022-01-07 PROCEDURE — 99999 PR PBB SHADOW E&M-EST. PATIENT-LVL IV: ICD-10-PCS | Mod: PBBFAC,,, | Performed by: DERMATOLOGY

## 2022-01-07 PROCEDURE — 11102 TANGNTL BX SKIN SINGLE LES: CPT | Mod: S$GLB,,, | Performed by: DERMATOLOGY

## 2022-01-07 PROCEDURE — 1101F PR PT FALLS ASSESS DOC 0-1 FALLS W/OUT INJ PAST YR: ICD-10-PCS | Mod: CPTII,S$GLB,, | Performed by: DERMATOLOGY

## 2022-01-07 PROCEDURE — 88305 TISSUE EXAM BY PATHOLOGIST: CPT | Mod: 26,,, | Performed by: PATHOLOGY

## 2022-01-07 PROCEDURE — 17000 DESTRUCT PREMALG LESION: CPT | Mod: XS,S$GLB,, | Performed by: DERMATOLOGY

## 2022-01-07 PROCEDURE — 3008F BODY MASS INDEX DOCD: CPT | Mod: CPTII,S$GLB,, | Performed by: DERMATOLOGY

## 2022-01-07 PROCEDURE — 99999 PR PBB SHADOW E&M-EST. PATIENT-LVL IV: CPT | Mod: PBBFAC,,, | Performed by: DERMATOLOGY

## 2022-01-07 PROCEDURE — 3008F PR BODY MASS INDEX (BMI) DOCUMENTED: ICD-10-PCS | Mod: CPTII,S$GLB,, | Performed by: DERMATOLOGY

## 2022-01-07 PROCEDURE — 3288F FALL RISK ASSESSMENT DOCD: CPT | Mod: CPTII,S$GLB,, | Performed by: DERMATOLOGY

## 2022-01-07 PROCEDURE — 88305 TISSUE EXAM BY PATHOLOGIST: CPT | Performed by: PATHOLOGY

## 2022-01-07 PROCEDURE — 1126F PR PAIN SEVERITY QUANTIFIED, NO PAIN PRESENT: ICD-10-PCS | Mod: CPTII,S$GLB,, | Performed by: DERMATOLOGY

## 2022-01-07 PROCEDURE — 1159F MED LIST DOCD IN RCRD: CPT | Mod: CPTII,S$GLB,, | Performed by: DERMATOLOGY

## 2022-01-07 PROCEDURE — 1160F PR REVIEW ALL MEDS BY PRESCRIBER/CLIN PHARMACIST DOCUMENTED: ICD-10-PCS | Mod: CPTII,S$GLB,, | Performed by: DERMATOLOGY

## 2022-01-07 PROCEDURE — 88305 TISSUE EXAM BY PATHOLOGIST: ICD-10-PCS | Mod: 26,,, | Performed by: PATHOLOGY

## 2022-01-07 PROCEDURE — 1159F PR MEDICATION LIST DOCUMENTED IN MEDICAL RECORD: ICD-10-PCS | Mod: CPTII,S$GLB,, | Performed by: DERMATOLOGY

## 2022-01-07 PROCEDURE — 1160F RVW MEDS BY RX/DR IN RCRD: CPT | Mod: CPTII,S$GLB,, | Performed by: DERMATOLOGY

## 2022-01-07 NOTE — PROGRESS NOTES
Subjective:       Patient ID:  Bhavna Landry is a 66 y.o. female who presents for   Chief Complaint   Patient presents with    Lesion     Right arm     Spot on right arm which for about a month.  Also new spot on left temple.       Review of Systems   Constitutional: Negative for fever, chills, weight loss, weight gain, fatigue, night sweats and malaise.   Skin: Negative for daily sunscreen use, activity-related sunscreen use and wears hat.   Hematologic/Lymphatic: Bruises/bleeds easily.        Objective:    Physical Exam   Constitutional: She appears well-developed and well-nourished. No distress.   Neurological: She is alert and oriented to person, place, and time. She is not disoriented.   Psychiatric: She has a normal mood and affect.   Skin:   Areas Examined (abnormalities noted in diagram):   Head / Face Inspection Performed  Neck Inspection Performed  RUE Inspected  LUE Inspection Performed                   Diagram Legend     Erythematous scaling macule/papule c/w actinic keratosis       Vascular papule c/w angioma      Pigmented verrucoid papule/plaque c/w seborrheic keratosis      Yellow umbilicated papule c/w sebaceous hyperplasia      Irregularly shaped tan macule c/w lentigo     1-2 mm smooth white papules consistent with Milia      Movable subcutaneous cyst with punctum c/w epidermal inclusion cyst      Subcutaneous movable cyst c/w pilar cyst      Firm pink to brown papule c/w dermatofibroma      Pedunculated fleshy papule(s) c/w skin tag(s)      Evenly pigmented macule c/w junctional nevus     Mildly variegated pigmented, slightly irregular-bordered macule c/w mildly atypical nevus      Flesh colored to evenly pigmented papule c/w intradermal nevus       Pink pearly papule/plaque c/w basal cell carcinoma      Erythematous hyperkeratotic cursted plaque c/w SCC      Surgical scar with no sign of skin cancer recurrence      Open and closed comedones      Inflammatory papules and pustules       "Verrucoid papule consistent consistent with wart     Erythematous eczematous patches and plaques     Dystrophic onycholytic nail with subungual debris c/w onychomycosis     Umbilicated papule    Erythematous-base heme-crusted tan verrucoid plaque consistent with inflamed seborrheic keratosis     Erythematous Silvery Scaling Plaque c/w Psoriasis     See annotation      Assessment / Plan:      Pathology Orders:     Normal Orders This Visit    Specimen to Pathology, Dermatology     Questions:    Procedure Type: Dermatology and skin neoplasms    Number of Specimens: 1    ------------------------: -------------------------    Spec 1 Procedure: Biopsy    Spec 1 Clinical Impression: keratoacanthoma    Spec 1 Source: right arm    Release to patient:         Neoplasm of uncertain behavior of skin  -     Specimen to Pathology, Dermatology  Shave biopsy performed after verbal consent including risk of infection, scar, recurrence, need for additional treatment of site. Area prepped with alcohol, anesthetized with 1% lidocaine with epinephrine. . Hemostasis achieved with monsels. No complications. Dressing applied. Wound care explained. Will need further rx if + ca          Actinic keratosis   Cryosurgery Procedure Note    Verbal consent from the patient is obtained and the patient is aware of the precancerous quality and need for treatment of these lesions. Liquid nitrogen cryosurgery is applied to the 1 actinic keratosis left temple, as detailed in the physical exam, to produce a freeze injury.      Lentigines  The "ABCD" rules to observe pigmented lesions were reviewed.  sunscreen             Follow up in about 6 months (around 7/7/2022).  "

## 2022-01-13 LAB
FINAL PATHOLOGIC DIAGNOSIS: NORMAL
GROSS: NORMAL
Lab: NORMAL
MICROSCOPIC EXAM: NORMAL

## 2022-01-24 ENCOUNTER — OFFICE VISIT (OUTPATIENT)
Dept: NEUROLOGY | Facility: CLINIC | Age: 67
End: 2022-01-24
Payer: MEDICARE

## 2022-01-24 DIAGNOSIS — G31.84 MILD COGNITIVE IMPAIRMENT WITH MEMORY LOSS: Primary | ICD-10-CM

## 2022-01-24 PROCEDURE — 90791 PR PSYCHIATRIC DIAGNOSTIC EVALUATION: ICD-10-PCS | Mod: 95,,, | Performed by: STUDENT IN AN ORGANIZED HEALTH CARE EDUCATION/TRAINING PROGRAM

## 2022-01-24 PROCEDURE — 99499 UNLISTED E&M SERVICE: CPT | Mod: 95,,, | Performed by: STUDENT IN AN ORGANIZED HEALTH CARE EDUCATION/TRAINING PROGRAM

## 2022-01-24 PROCEDURE — 99499 NO LOS: ICD-10-PCS | Mod: 95,,, | Performed by: STUDENT IN AN ORGANIZED HEALTH CARE EDUCATION/TRAINING PROGRAM

## 2022-01-24 PROCEDURE — 90791 PSYCH DIAGNOSTIC EVALUATION: CPT | Mod: 95,,, | Performed by: STUDENT IN AN ORGANIZED HEALTH CARE EDUCATION/TRAINING PROGRAM

## 2022-01-26 ENCOUNTER — PROCEDURE VISIT (OUTPATIENT)
Dept: DERMATOLOGY | Facility: CLINIC | Age: 67
End: 2022-01-26
Payer: MEDICARE

## 2022-01-26 VITALS
WEIGHT: 106 LBS | SYSTOLIC BLOOD PRESSURE: 133 MMHG | HEART RATE: 66 BPM | BODY MASS INDEX: 20.81 KG/M2 | DIASTOLIC BLOOD PRESSURE: 64 MMHG | HEIGHT: 60 IN

## 2022-01-26 DIAGNOSIS — C44.622 SQUAMOUS CELL CARCINOMA OF RIGHT UPPER EXTREMITY: Primary | ICD-10-CM

## 2022-01-26 PROCEDURE — 17313 MOHS 1 STAGE T/A/L: CPT | Mod: 51,S$GLB,, | Performed by: DERMATOLOGY

## 2022-01-26 PROCEDURE — 99499 NO LOS: ICD-10-PCS | Mod: S$GLB,,, | Performed by: DERMATOLOGY

## 2022-01-26 PROCEDURE — 99499 UNLISTED E&M SERVICE: CPT | Mod: S$GLB,,, | Performed by: DERMATOLOGY

## 2022-01-26 PROCEDURE — 13121 CMPLX RPR S/A/L 2.6-7.5 CM: CPT | Mod: S$GLB,,, | Performed by: DERMATOLOGY

## 2022-01-26 PROCEDURE — 13121 PR RECMPL WND SCALP,EXTR 2.6-7.5 CM: ICD-10-PCS | Mod: S$GLB,,, | Performed by: DERMATOLOGY

## 2022-01-26 PROCEDURE — 17313: ICD-10-PCS | Mod: 51,S$GLB,, | Performed by: DERMATOLOGY

## 2022-01-26 NOTE — PROGRESS NOTES
PROCEDURE: Mohs' Micrographic Surgery    INDICATION: Tumors with aggressive clinical behavior (rapidly growing, greater than 1 cm in diameter). Tumor with ill-defined borders.    REFERRING PROVIDER: Josefina Vega M.D.    CASE NUMBER:     ANESTHETIC: 3 cc 0.5% Lidocaine with Epi 1:200,000 mixed 1:1 with 0.5% Bupivacaine    SURGICAL PREP: Hibiclens    SURGEON: Christina Leblanc MD    ASSISTANTS: Darcy Yang PA-C and Yodit Rowell, [facility]    PREOPERATIVE DIAGNOSIS: squamous cell carcinoma- keratoacanthoma    POSTOPERATIVE DIAGNOSIS: squamous cell carcinoma    PATHOLOGIC DIAGNOSIS: squamous cell carcinoma- keratoacanthoma, well differentiated    HISTOLOGY OF SPECIMENS IN FIRST STAGE:   Tumor Type: No tumor seen. Inflammation.    STAGES OF MOHS' SURGERY PERFORMED: 1    TUMOR-FREE PLANE ACHIEVED: Yes    HEMOSTASIS: electrocoagulation     SPECIMENS: 2    LOCATION: right arm (mid lower dorsal). Location verified with Dr. Vega's clinical photograph. Patient also verified location.    INITIAL LESION SIZE: 0.6 x 0.9 cm    FINAL DEFECT SIZE: 1.2 x 1.3 cm    WOUND REPAIR/DISPOSITION: The patient tolerated Mohs' Micrographic Surgery for a squamous cell carcinoma very well. When the tumor was completely removed, a repair of the surgical defect was undertaken.       PROCEDURE: Complex Linear Repair    INDICATION: Status post Mohs' Micrographic Surgery for squamous cell carcinoma.    CASE NUMBER:     SURGEON: Christina Leblanc MD    ASSISTANTS: Darcy Yang PA-C and Jackie Campbell MA    ANESTHETIC: 1.5 cc 1% Lidocaine with Epinephrine 1:100,000    SURGICAL PREP: Hibiclens, prepped by Jackie Campbell MA    LOCATION: right arm    DEFECT SIZE: 1.2 x 1.3 cm    WOUND REPAIR/DISPOSITION:  After the patient's carcinoma had been completely removed with Mohs' Micrographic Surgery, a repair of the surgical defect was undertaken. The patient was returned to the operating suite where the area of right arm was prepped, draped, and  anesthetized in the usual sterile fashion. The wound was widely undermined in all directions. The wound was undermined to a distance at least the maximum width of the defect as measured perpendicular to the closure line along at least one entire edge of the defect, in this case 2 cm. Then, electrocoagulation and 4-0 vicryl suture ties were used to obtain meticulous hemostasis. 3-0 Vicryl buried vertical mattress sutures were placed into the subcutaneous and dermal plane to close the wound and susana the cutaneous wound edge. Bilateral dog ears were identified and were removed by a standard Burow's triangle technique. The cutaneous wound edges were closed using interrupted 4-0 Prolene suture.    The patient tolerated the procedure well.    The area was cleaned and dressed appropriately and the patient was given wound care instructions, as well as appointment for follow-up evaluation and suture removal in 14 days.    LENGTH OF REPAIR: 3 cm    Vitals:    01/26/22 1127 01/26/22 1251   BP: 127/65 133/64   Pulse: 67 66   Weight: 48.1 kg (106 lb)    Height: 5' (1.524 m)

## 2022-01-27 NOTE — PROGRESS NOTES
NEUROPSYCHOLOGY CONSULT    Referral Information  Name: Bhavna Landry  MRN: [id number]  Age: 66 y.o.    : 1955  Race: White    Education: 12 years   Gender: female   Handedness: Ambidextrous     Referring Provider: Dr. Carol Ann Poe  Referral Reason/Medical Necessity: Memory concerns.  Consent/Emergency Plan: The patient expressed an understanding of the purpose of the evaluation and consented to all procedures. I informed the patient of limits to confidentiality and discussed an emergency plan.  Telemedicine   Patient location: Patients home in [facility]   Visit type: Virtual visit with synchronous audio and video.  Total time spent with patient: 55 minutes  Each patient to whom he or she provides medical services by telemedicine is: (1) informed of the relationship between the physician and patient and the respective role of any other health care provider with respect to management of the patient; and (2) notified that he or she may decline to receive medical services by telemedicine and may withdraw from such care at any time.  Sources of Information: The following was gathered from a clinical interview with Ms. Landry, her daughter in a separate interview with her permission, and review of available medical records.  Billing table provided at the end of this note.    SUMMARY/TREATMENT PLAN   Results from the interview indicate the following diagnoses and treatment plan recommendations. The patient is likely able to follow a treatment plan without help from family.     Ms. Landry is a 66 y.o. female referred for a neuropsychological evaluation due to progressive memory concerns and slower processing speed for the past couple years. She notes longstanding word finding problems. Her history includes paranoid schizophrenia, depression, and anxiety; symptoms are reported as well managed. She also has a history of insomnia and takes Benadryl on nightly basis. There is no formal diagnosis of dementia in  her family history, but her older brother had memory problems. In-person neuropsychological testing to follow on 1/31/2022.    Problem List Items Addressed This Visit        Neuro    Mild cognitive impairment with memory loss - Primary        Thank you for allowing me to participate in Ms. Landry's care.  If you have any questions, please contact me at [phone].    Isis Jeter, Ph.D.  Licensed Clinical Neuropsychologist  [facility] - Department of Neurology    CLINICAL INTERVIEW & RECORD REVIEW   COGNITIVE SYMPTOMS & HISTORY OF PRESENT ILLNESS  Ms. Landry reports a gradual onset of progressive memory problems in the past couple year. She frequently misplaces items in her home (e.g., leaves her keys in her jacket pocket). If her daughter tells her information in the morning, she may forget by the afternoon. Her daughter will ask her to do a task and she will forget to do it. She watches a sitcom and sometimes forgets the plot of the previous episode. At times she is unsure of plans she has made. Her cognitive speed is slower, and she has a harder time balancing her checkbook. She reports that word finding has always been a weakness. There are no reported issues with judgment, decision making, or safety.     ACTIVITIES OF DAILY LIVING  She is independent in basic ADLs. She drives without getting lost or traffic accidents; she frequently drives her grandchildren to their work or appointments. She prepares simple meals and handles household chores and grocery shopping without difficulty. Her daughter assists her with finances, mediation, and more complex cooking. Her daughter manages her psychotropic medication, as she had been unreliable with taking it in the past. She organizes a separate pill box for all other medications.     PHYSICAL SYMPTOMS  Pain in joints due to arthritis; back and hip pain. On a 1-10 pain rating scale (ascending severity), she endorses a daily average of 7. She reports shuffling gait in  the mornings upon waking that resolves after walking for a few minutes. She denies recent UTIs. She uses hearing aids and notes that she needs a prescription update to her glasses.     MOOD/PSYCHIATRIC HISTORY  She describes her recent mood a normal and denies significant distress. She worries about her memory and occasionally becomes worried about whether her [employer] income will keep up with inflation. She feels well supported by family. She also finds support in her friends, with whom she regularly speaks on the phone or goes on walks. Psychiatric history is notable for paranoid schizophrenia, depression, and anxiety (diagnosed around age 33). Symptoms are well managed, and she is followed by Dr. Poe. She required psychiatric hospitalization 3 years ago due to running out of medication. Prior to that, her last psychiatric hospitalization was in her 30s. She and her daughter do not observe personality changes.    Behavior: Negative for agitation, delusions, apathy, perseveration. She reports no current hallucinations (most recent episode 3 years ago when off medication)  Neurovegetative: Sleeps 5-6 hours per night and takes 1-hour naps daily. Does not take Trazadone regularly but is using Benadryl. RLS rarely interferes with sleep. Appetite is unchanged. Energy level has declined.  Suicidal/Homicidal Ideation: Denied.    SOCIAL HISTORY AND HEALTH BEHAVIOR  Family Status:  18 years ago. Has 1 daughter.   Current Living Situation: Lives in a home with her daughter, son-in-law, and 3 grandchildren.   Daily Activities: Chores; television; memory games on her phone; word finding puzzles  Academic History: Denies history of learning disorder; repeated grade 5; earned average grades in high school; reading is a longstanding weakness.   Education Level: High school.  Occupational Status and History:  for [employer] for 30 years.   Exercise: Daily walks with friend but stopped recently due  to cold weather.   Substance Use: Smokes 10 cigarettes per day; began smoking at age 18. She does not consume alcohol or use other drugs.     FAMILY HISTORY  Includes Arthritis in her brother; Cancer in her father and mother; Colon cancer in her mother; Depression in her mother; Diabetes in her brother; Heart disease in her brother; Psoriasis in her mother; Stroke in her sister. She adds that her older brother had memory problems before he passed away in his 70s. Her parents passed away in their 70s.     MEDICAL STATUS  Patient Active Problem List   Diagnosis    PSA (psoriatic arthritis)    Low back pain    Essential hypertension    Hyperlipidemia    Paranoid schizophrenia    Insomnia    Extrapyramidal syndrome    Migraine without aura and without status migrainosus, not intractable    Total knee replacement status    Nuclear sclerosis - Both Eyes    Duane's syndrome of right eye    Posterior vitreous detachment, both eyes    Retinal hemorrhage, left    Retinal tear    Syncope    Fibromyalgia    History of nickel allergy    Exposure to nickel dust    Internal derangement of right knee    S/P R knee surgery, TKA revision    Knee pain, right    Pain of right tibia    Brow ptosis    Anxiety    Sacroiliac joint dysfunction    Chronic pain    Myofascial pain syndrome    Restless legs    Osteoporosis, post-menopausal    Senile nuclear sclerosis    Venous incompetence    Bilateral sacroiliitis    PAF (paroxysmal atrial fibrillation)    Bilateral carotid artery disease    Chronic anticoagulation    Left-sided low back pain without sciatica    Left groin pain    Left hip pain    Mucopurulent chronic bronchitis    Vestibular disequilibrium, left    Decreased ROM of neck    Chronic left hip pain    Greater trochanteric bursitis of left hip    Hyponatremia    Hypertensive urgency    Normocytic anemia    Depression    Rheumatoid arthritis    Extrapyramidal symptom    Mild  cognitive impairment with memory loss     Past Medical History:   Diagnosis Date    Allergy     Amblyopia     Anemia     Anticoagulant long-term use     Arthritis 02/02/1992    Cataract     Depression     Dry eyes     Dry mouth     Duane's syndrome of right eye     Fibromyalgia 4/17/2014    Fibromyalgia     Fractured hip     RIGHT HIP    GERD (gastroesophageal reflux disease)     History of psychiatric hospitalization     Hyperlipidemia 02/02/1992    Hypertension     Hypocalcemia     Hyponatremia     Kidney stone     Migraine headache     Osteoporosis     Psoriatic arthritis 02/02/1992    Right knee pain     post knee replacement surgery (possible rejection of metal)    RLS (restless legs syndrome)     Schizophrenia 02/02/1992    stable on meds    Urinary tract infection      Additional medical history, including surgical history, is available in the patients electronic medical record.     UPDATED/RELEVANT NEUROLOGIC HISTORY  Falls: Denies recent falls.   TBI: Tripped over her dog and struck her head on a drum set 5-6 years ago without LOC. Required stitches at the ED and discharged the same day. Reports no cognitive sequelae following that event.   Seizures: None reported.  Stroke: None reported.  Movement Concerns: Shuffling gait in the morning.   Referral Diagnosis: R41.3 (ICD-10-CM) - Memory loss    NEUROIMAGING  Results for orders placed or performed during the hospital encounter of 08/22/20   MRI Brain Without Contrast        FINDINGS:  The craniocervical junction is within normal limits.  The sellar and parasellar structures are within normal limits.  The intracranial flow voids are within normal limits.    No diffusion-weighted signal abnormality is present.  The ventricles and sulci are within normal limits for the patient's age.  There are minimal scattered T2/FLAIR signal hyperintensities within the periventricular white matter.  There are no extra-axial fluid collections.   "There is no evidence of intracranial hemorrhage.  There is no evidence of mass effect.    There are postoperative changes in the orbits.  The paranasal sinuses unremarkable.  The mastoid air cells are clear.  The calvarium is intact.      Impression    No acute intracranial process       RECENT LABS  Lab Results   Component Value Date    MOZCGICE56 >2000 (H) 03/30/2021     Lab Results   Component Value Date    RPR Non-reactive 11/12/2020     Lab Results   Component Value Date    FOLATE 17.7 03/30/2021     Lab Results   Component Value Date    TSH 2.265 03/30/2021     Lab Results   Component Value Date    HGBA1C 5.3 12/11/2018     Lab Results   Component Value Date    OEQ66JAEX Negative 11/12/2020       CURRENT MEDICATIONS  Ms. Landry has a current medication list which includes the following prescription(s): albuterol, amlodipine, ascorbic acid (vitamin c), aspirin, atorvastatin, azelastine, b complex vitamins, benztropine, clopidogrel, cyanocobalamin, diclofenac sodium, fluticasone propionate, folic acid, gabapentin, isosorbide mononitrate, lisinopril, methotrexate, multivitamin, risperidone, risperidone, xeljanz xr, trazodone, venlafaxine, vitamin d, vitamin e, and zinc gluconate, and the following Facility-Administered Medications: sodium chloride 0.9%, sodium chloride 0.9%, lidocaine (pf) 10 mg/ml (1%), phenylephrine hcl 2.5%, proparacaine, and tropicamide 1%.     MENTAL STATUS AND OBSERVATIONS  Appearance: Casually dressed and adequate grooming/hygiene.   Alertness/orientation: Attentive and alert. She is oriented to person, place, and circumstance. Temporal orientation was not formally assessed in this visit.   Gait/motor: Unable to observe (virtual visit).  Sensory: Unremarkable.  Speech/language: Normal in rate, rhythm, tone, and volume. Occasional word finding hesitancies were noted. Expressive and receptive language was normal.  Stated mood/affect: The patients stated mood was "normal." Affect appeared " mildly restricted.    Interpersonal behavior: Rapport was quickly and easily established.   Thought processes/insight: Logical and goal-directed. Reponses to interview questions were somewhat concrete.    BILLING     Service Description CPT Code Minutes Units   Psychiatric diagnostic evaluation by physician [phone]   Neurobehavioral status exam by physician [id number]  0   Each additional hour by physician [id number]  0

## 2022-01-31 ENCOUNTER — OFFICE VISIT (OUTPATIENT)
Dept: NEUROLOGY | Facility: CLINIC | Age: 67
End: 2022-01-31
Payer: MEDICARE

## 2022-01-31 DIAGNOSIS — R41.3 MEMORY LOSS: ICD-10-CM

## 2022-01-31 DIAGNOSIS — F41.9 ANXIETY: ICD-10-CM

## 2022-01-31 DIAGNOSIS — F20.0 PARANOID SCHIZOPHRENIA: Primary | ICD-10-CM

## 2022-01-31 DIAGNOSIS — G31.84 MILD COGNITIVE IMPAIRMENT WITH MEMORY LOSS: ICD-10-CM

## 2022-01-31 PROCEDURE — 96138 PSYCL/NRPSYC TECH 1ST: CPT | Mod: S$GLB,,, | Performed by: STUDENT IN AN ORGANIZED HEALTH CARE EDUCATION/TRAINING PROGRAM

## 2022-01-31 PROCEDURE — 96139 PR PSYCH/NEUROPSYCH TEST ADMIN/SCORING, BY TECH, 2+ TESTS, EA ADDTL 30 MIN: ICD-10-PCS | Mod: S$GLB,,, | Performed by: STUDENT IN AN ORGANIZED HEALTH CARE EDUCATION/TRAINING PROGRAM

## 2022-01-31 PROCEDURE — 96139 PSYCL/NRPSYC TST TECH EA: CPT | Mod: S$GLB,,, | Performed by: STUDENT IN AN ORGANIZED HEALTH CARE EDUCATION/TRAINING PROGRAM

## 2022-01-31 PROCEDURE — 96132 PR NEUROPSYCHOLOGIC TEST EVAL SVCS, 1ST HR: ICD-10-PCS | Mod: S$GLB,,, | Performed by: STUDENT IN AN ORGANIZED HEALTH CARE EDUCATION/TRAINING PROGRAM

## 2022-01-31 PROCEDURE — 99499 NO LOS: ICD-10-PCS | Mod: S$GLB,,, | Performed by: STUDENT IN AN ORGANIZED HEALTH CARE EDUCATION/TRAINING PROGRAM

## 2022-01-31 PROCEDURE — 96133 PR NEUROPSYCHOLOGIC TEST EVAL SVCS, EA ADDTL HR: ICD-10-PCS | Mod: S$GLB,,, | Performed by: STUDENT IN AN ORGANIZED HEALTH CARE EDUCATION/TRAINING PROGRAM

## 2022-01-31 PROCEDURE — 96133 NRPSYC TST EVAL PHYS/QHP EA: CPT | Mod: S$GLB,,, | Performed by: STUDENT IN AN ORGANIZED HEALTH CARE EDUCATION/TRAINING PROGRAM

## 2022-01-31 PROCEDURE — 99499 UNLISTED E&M SERVICE: CPT | Mod: S$GLB,,, | Performed by: STUDENT IN AN ORGANIZED HEALTH CARE EDUCATION/TRAINING PROGRAM

## 2022-01-31 PROCEDURE — 96132 NRPSYC TST EVAL PHYS/QHP 1ST: CPT | Mod: S$GLB,,, | Performed by: STUDENT IN AN ORGANIZED HEALTH CARE EDUCATION/TRAINING PROGRAM

## 2022-01-31 PROCEDURE — 96138 PR PSYCH/NEUROPSYCH TEST ADMIN/SCORING, BY TECH, 2+ TESTS, 1ST 30 MIN: ICD-10-PCS | Mod: S$GLB,,, | Performed by: STUDENT IN AN ORGANIZED HEALTH CARE EDUCATION/TRAINING PROGRAM

## 2022-02-01 ENCOUNTER — PATIENT MESSAGE (OUTPATIENT)
Dept: DERMATOLOGY | Facility: CLINIC | Age: 67
End: 2022-02-01
Payer: MEDICARE

## 2022-02-01 ENCOUNTER — TELEPHONE (OUTPATIENT)
Dept: DERMATOLOGY | Facility: CLINIC | Age: 67
End: 2022-02-01
Payer: MEDICARE

## 2022-02-01 NOTE — TELEPHONE ENCOUNTER
----- Message from Jovani Stout sent at 2/1/2022 10:22 AM CST -----  Regarding: Pt Inquiry  Pt called and requesting a call back in regards to her recent surgery. Pt advised her surgery site is oozing yellowing. Requesting a call back to discuss.        [phone] (home)

## 2022-02-01 NOTE — TELEPHONE ENCOUNTER
Contacted Ms. Landry she stated that the site was oozing in two places, she stated that she had some concerns asked if she could send a photo she stated that it would be a little later. She stated that the site was not painful or irritated but does itch.

## 2022-02-02 ENCOUNTER — LAB VISIT (OUTPATIENT)
Dept: LAB | Facility: HOSPITAL | Age: 67
End: 2022-02-02
Attending: INTERNAL MEDICINE
Payer: MEDICARE

## 2022-02-02 DIAGNOSIS — Z79.899 ON SULFASALAZINE THERAPY: ICD-10-CM

## 2022-02-02 DIAGNOSIS — E87.1 HYPONATREMIA: ICD-10-CM

## 2022-02-02 LAB
ALBUMIN SERPL BCP-MCNC: 3.6 G/DL (ref 3.5–5.2)
ALP SERPL-CCNC: 65 U/L (ref 55–135)
ALT SERPL W/O P-5'-P-CCNC: 14 U/L (ref 10–44)
ANION GAP SERPL CALC-SCNC: 13 MMOL/L (ref 8–16)
ANION GAP SERPL CALC-SCNC: 13 MMOL/L (ref 8–16)
AST SERPL-CCNC: 21 U/L (ref 10–40)
BASOPHILS # BLD AUTO: 0.08 K/UL (ref 0–0.2)
BASOPHILS NFR BLD: 0.9 % (ref 0–1.9)
BILIRUB SERPL-MCNC: 0.3 MG/DL (ref 0.1–1)
BUN SERPL-MCNC: 6 MG/DL (ref 8–23)
BUN SERPL-MCNC: 6 MG/DL (ref 8–23)
CALCIUM SERPL-MCNC: 9.3 MG/DL (ref 8.7–10.5)
CALCIUM SERPL-MCNC: 9.3 MG/DL (ref 8.7–10.5)
CHLORIDE SERPL-SCNC: 100 MMOL/L (ref 95–110)
CHLORIDE SERPL-SCNC: 100 MMOL/L (ref 95–110)
CO2 SERPL-SCNC: 23 MMOL/L (ref 23–29)
CO2 SERPL-SCNC: 23 MMOL/L (ref 23–29)
CREAT SERPL-MCNC: 0.8 MG/DL (ref 0.5–1.4)
CREAT SERPL-MCNC: 0.8 MG/DL (ref 0.5–1.4)
CRP SERPL-MCNC: <0.3 MG/L (ref 0–8.2)
DIFFERENTIAL METHOD: ABNORMAL
EOSINOPHIL # BLD AUTO: 0.2 K/UL (ref 0–0.5)
EOSINOPHIL NFR BLD: 2.4 % (ref 0–8)
ERYTHROCYTE [DISTWIDTH] IN BLOOD BY AUTOMATED COUNT: 14.9 % (ref 11.5–14.5)
ERYTHROCYTE [SEDIMENTATION RATE] IN BLOOD BY WESTERGREN METHOD: 3 MM/HR (ref 0–36)
EST. GFR  (AFRICAN AMERICAN): >60 ML/MIN/1.73 M^2
EST. GFR  (AFRICAN AMERICAN): >60 ML/MIN/1.73 M^2
EST. GFR  (NON AFRICAN AMERICAN): >60 ML/MIN/1.73 M^2
EST. GFR  (NON AFRICAN AMERICAN): >60 ML/MIN/1.73 M^2
GLUCOSE SERPL-MCNC: 89 MG/DL (ref 70–110)
GLUCOSE SERPL-MCNC: 89 MG/DL (ref 70–110)
HCT VFR BLD AUTO: 41.4 % (ref 37–48.5)
HGB BLD-MCNC: 13.6 G/DL (ref 12–16)
IMM GRANULOCYTES # BLD AUTO: 0.03 K/UL (ref 0–0.04)
IMM GRANULOCYTES NFR BLD AUTO: 0.3 % (ref 0–0.5)
LYMPHOCYTES # BLD AUTO: 1.9 K/UL (ref 1–4.8)
LYMPHOCYTES NFR BLD: 20.2 % (ref 18–48)
MCH RBC QN AUTO: 31.8 PG (ref 27–31)
MCHC RBC AUTO-ENTMCNC: 32.9 G/DL (ref 32–36)
MCV RBC AUTO: 97 FL (ref 82–98)
MONOCYTES # BLD AUTO: 0.9 K/UL (ref 0.3–1)
MONOCYTES NFR BLD: 9.2 % (ref 4–15)
NEUTROPHILS # BLD AUTO: 6.2 K/UL (ref 1.8–7.7)
NEUTROPHILS NFR BLD: 67 % (ref 38–73)
NRBC BLD-RTO: 0 /100 WBC
OSMOLALITY SERPL: 288 MOSM/KG (ref 275–295)
PLATELET # BLD AUTO: 342 K/UL (ref 150–450)
PMV BLD AUTO: 9 FL (ref 9.2–12.9)
POTASSIUM SERPL-SCNC: 4.5 MMOL/L (ref 3.5–5.1)
POTASSIUM SERPL-SCNC: 4.5 MMOL/L (ref 3.5–5.1)
PROT SERPL-MCNC: 6 G/DL (ref 6–8.4)
RBC # BLD AUTO: 4.28 M/UL (ref 4–5.4)
SODIUM SERPL-SCNC: 136 MMOL/L (ref 136–145)
SODIUM SERPL-SCNC: 136 MMOL/L (ref 136–145)
WBC # BLD AUTO: 9.21 K/UL (ref 3.9–12.7)

## 2022-02-02 PROCEDURE — 85025 COMPLETE CBC W/AUTO DIFF WBC: CPT | Performed by: INTERNAL MEDICINE

## 2022-02-02 PROCEDURE — 86140 C-REACTIVE PROTEIN: CPT | Performed by: INTERNAL MEDICINE

## 2022-02-02 PROCEDURE — 80053 COMPREHEN METABOLIC PANEL: CPT | Performed by: INTERNAL MEDICINE

## 2022-02-02 PROCEDURE — 83930 ASSAY OF BLOOD OSMOLALITY: CPT | Performed by: INTERNAL MEDICINE

## 2022-02-02 PROCEDURE — 36415 COLL VENOUS BLD VENIPUNCTURE: CPT | Mod: PO | Performed by: INTERNAL MEDICINE

## 2022-02-02 PROCEDURE — 85652 RBC SED RATE AUTOMATED: CPT | Performed by: INTERNAL MEDICINE

## 2022-02-05 NOTE — PROGRESS NOTES
NEUROPSYCHOLOGY CONSULT    Referral Information  Name: Bhavna Landry  MRN: [id number]  Age: 66 y.o.    : 1955  Race: White    Education: 12 years   Gender: female   Handedness: Ambidextrous     Referring Provider: Dr. Carol Ann Poe  Referral Reason/Medical Necessity: Memory concerns.  Consent/Emergency Plan: The patient expressed an understanding of the purpose of the evaluation and consented to all procedures. I informed the patient of limits to confidentiality and discussed an emergency plan.  Visit Type: In-person neuropsychological testing on 22.   Sources of Information: The following was gathered from a clinical interview with Ms. Landry, her daughter in a separate interview with her permission, and review of available medical records.  Billing table provided at the end of this note.    SUMMARY/TREATMENT PLAN   Results from the interview indicate the following diagnoses and treatment plan recommendations. The patient is likely able to follow a treatment plan without help from family.    Ms. Landry is a 66 y.o. female referred for a neuropsychological evaluation due to memory concerns and slower cognitive speed in past couple years. She has history of paranoid schizophrenia, depression, and anxiety; symptoms are currently well managed. There is no formal diagnosis of dementia in her family history, but her older brother had memory problems.    On neuropsychological testing, Ms. Landry demonstrates variable executive functioning, memory, and processing speed. Her executive functioning is characterized by impaired mental flexibility with relative weakness in verbal concept formation, updating information, and systematic problem solving. Other aspects of executive functioning however (e.g., working memory) are within normal limits. Learning and memory are inefficient for new unstructured information (word list, visual figures). However, memory is within normal limits for narrative prose  information. She also demonstrates difficulty with naming and reading, consistent with her report of longstanding weakness in these domains, but category fluency is within normal limits.     Select executive functioning deficits and variable processing speed suggest frontal-subcortical systems inefficiency. This profile is consistent with individuals with history of schizophrenia. Without a baseline evaluation against which to compare todays results, it is difficult to determine whether her deficits are longstanding or reflect recent decline. She demonstrates memory weakness, but no consistent pattern of rapid forgetting to suggest medial temporal dysfunction. An emerging Alzheimers disease process cannot be entirely ruled out given her family history, but it appears less likely at this time. There are more proximal factors that are likely contributing to cognitive inefficiencies (insufficient sleep, nightly use of Benadryl). This evaluation will serve as her baseline, and repeat neuropsychological testing in the future will be important to monitor changes over time.     Problem List Items Addressed This Visit        Neuro    Mild cognitive impairment with memory loss       Psychiatric    Paranoid schizophrenia - Primary    Anxiety      Other Visit Diagnoses     Memory loss              PLAN & RECOMMENDATIONS  1. Feedback to discuss results and recommendations is scheduled for 2/9/22.   2. Limit use of Benadryl. Some research suggests a possible link between regular use of certain anticholinergic medications (such as diphenhydramine) and cognitive impairments. Discuss alternative sleep aids with your provider.   3. Practice good sleep hygiene:  a. Drink water earlier in the evening. Avoid drinking anything before bed.   b. Avoid use of electronics in bed.  c. Maintain a cool and dark atmosphere in the bedroom.  d. Maintain a consistent sleep/wake schedule and relaxing evening routine.  e. Go to bed only if you  feel sleepy.  f. If you cannot fall asleep, leave the bedroom, and return only when sleepy.  g. Exercise early in the day.  h. Get sufficient bright and natural light exposure early in the day.  4. Implement compensatory strategies for memory and attention:  · Avoid situations that require multitasking or division of attention.  · Become an active participant in conversations and activities rather than a passive observer. For example, paraphrasing what people are telling you in your head and repeating back important information aloud can help increase level of engagement and interest, and decrease overall distractibility.   · Plan short activities and plan and take frequent breaks when engaging in cognitively demanding task.   · Reduce distractions when engaged in cognitively demanding tasks.  · Use alarm clocks and timers as reminders (e.g., when to take medications, reminder to turn off stove or oven, reminder to check laundry, etc.).  · Repeated/multiple exposures to important information to help encode it into memory.  · Increase the relevance of presented information by rephrasing the information in your own words.   · Where possible, obtain important information (e.g., instructions from doctors) in multiple modalities (verbal statements, visual handouts/notes)  5. Vision care. Ms. Landry reported that she needs an updated prescription for her glasses.   6. Incorporate healthy habits that facilitate healthy brain aging:  a. Mental exercise, such as reading, doing puzzles, learning something new.  b. Healthy and balanced diet, e.g., Mediterranean diet - olive oil, nuts, fish, vegetables, legumes, fruit, whole grains, moderate dairy products, limit intake of red meat and poultry.  c. Monitor and manage cardiac risk factors, such as hypertension, high cholesterol, diabetes and high BMI.   d. Regular aerobic exercise, such as walking, 150 minutes per week (pulse over 100 beats per minute with your doctors  approval). Also, aim at taking 10,000 steps per day.   e. Eat foods high in antioxidants, for example, grapes, beans, berries, green leafy vegetables and green tea.  7. Mood monitoring. She endorses mild symptoms of anxiety. Continued follow up with her psychiatrist is recommended.   8. Repeat neuropsychological testing in 12-16 months to monitor cognition over time and update recommendations.      Thank you for allowing me to participate in Ms. Landry's care.  If you have any questions, please contact me at [phone].    Isis Jeter, Ph.D.  Licensed Clinical Neuropsychologist  [facility] - Department of Neurology      CLINICAL INTERVIEW & RECORD REVIEW   COGNITIVE SYMPTOMS & HISTORY OF PRESENT ILLNESS  Ms. Landry reports a gradual onset of progressive memory problems in the past couple year. She frequently misplaces items in her home (e.g., leaves her keys in her jacket pocket). If her daughter tells her information in the morning, she may forget by the afternoon. Her daughter will ask her to do a task and she will forget to do it. However, when reminded she will recall that the conversation happened. She watches a sitcom and sometimes forgets the plot of the previous episode. At times she is unsure of plans she has made. Her cognitive speed is slower, and she has a harder time balancing her checkbook. She reports that word finding has always been a weakness. There are no reported issues with judgment, decision making, or safety.     ACTIVITIES OF DAILY LIVING  She is independent in basic ADLs. She drives without getting lost or traffic accidents; she frequently drives her grandchildren to their work or appointments. She prepares simple meals and handles her schedule, household chores, and grocery shopping without difficulty. Her daughter has assisted with finances for many years. Her daughter also manages her psychotropic medication, as she was unreliable with taking it in the past. She organizes a separate  pill box for all other medications with good compliance.     PHYSICAL SYMPTOMS  Pain in joints due to arthritis; back and hip pain. On a 1-10 pain rating scale (ascending severity), she endorses a daily average of 7. She reports shuffling gait in the mornings upon waking that resolves after walking for a few minutes. She denies recent UTIs. She uses hearing aids and notes that she needs a prescription update to her glasses.     MOOD/PSYCHIATRIC HISTORY  She describes her recent mood a normal and denies significant distress. 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 She feels well supported by family. She also finds support in her friends, with whom she regularly speaks on the phone or goes on walks. Psychiatric history is notable for paranoid schizophrenia, depression, and anxiety (diagnosed around age 33). Symptoms are well managed, and she is followed by Dr. Poe. She required psychiatric hospitalization 3 years ago due to running out of medication. Prior to that, her last psychiatric hospitalization was in her 30s. She and her daughter do not observe personality changes.    Behavior: Negative for agitation, delusions, apathy, perseveration. She reports no current hallucinations (most recent episode 3 years ago when off medication)  Neurovegetative: Sleeps 5-6 hours per night and takes 1-hour naps daily; does not take Trazodone regularly but is using Benadryl as a sleep aid; RLS rarely interferes with sleep; appetite is unchanged; energy level has declined.  Suicidal/Homicidal Ideation: Denied.    SOCIAL HISTORY AND HEALTH BEHAVIOR  Family Status:  18 years ago. Has 1 daughter.   Current Living Situation: Lives in a home with her daughter, son-in-law, and 3 grandchildren.   Daily Activities: Chores; television; memory games on her phone; word finding puzzles  Academic History: Denies history of learning disorder;  repeated grade 5; earned average grades in high school; reading is a longstanding weakness.   Education Level: High school.  Occupational Status and History:  for [employer] for 30 years.   Exercise: Daily walks with friend but stopped recently due to cold weather.   Substance Use: Smokes 10 cigarettes per day; began smoking at age 18. She does not consume alcohol or use other drugs.     FAMILY HISTORY  Includes Arthritis in her brother; Cancer in her father and mother; Colon cancer in her mother; Depression in her mother; Diabetes in her brother; Heart disease in her brother; Psoriasis in her mother; Stroke in her sister. She adds that her older brother had memory problems before he passed away in his 70s. Her parents passed away in their 70s.     MEDICAL STATUS  Patient Active Problem List   Diagnosis    PSA (psoriatic arthritis)    Low back pain    Essential hypertension    Hyperlipidemia    Paranoid schizophrenia    Insomnia    Extrapyramidal syndrome    Migraine without aura and without status migrainosus, not intractable    Total knee replacement status    Nuclear sclerosis - Both Eyes    Duane's syndrome of right eye    Posterior vitreous detachment, both eyes    Retinal hemorrhage, left    Retinal tear    Syncope    Fibromyalgia    History of nickel allergy    Exposure to nickel dust    Internal derangement of right knee    S/P R knee surgery, TKA revision    Knee pain, right    Pain of right tibia    Brow ptosis    Anxiety    Sacroiliac joint dysfunction    Chronic pain    Myofascial pain syndrome    Restless legs    Osteoporosis, post-menopausal    Senile nuclear sclerosis    Venous incompetence    Bilateral sacroiliitis    PAF (paroxysmal atrial fibrillation)    Bilateral carotid artery disease    Chronic anticoagulation    Left-sided low back pain without sciatica    Left groin pain    Left hip pain    Mucopurulent chronic bronchitis    Vestibular  disequilibrium, left    Decreased ROM of neck    Chronic left hip pain    Greater trochanteric bursitis of left hip    Hyponatremia    Hypertensive urgency    Normocytic anemia    Depression    Rheumatoid arthritis    Extrapyramidal symptom    Mild cognitive impairment with memory loss     Past Medical History:   Diagnosis Date    Allergy     Amblyopia     Anemia     Anticoagulant long-term use     Arthritis 02/02/1992    Cataract     Depression     Dry eyes     Dry mouth     Duane's syndrome of right eye     Fibromyalgia 4/17/2014    Fibromyalgia     Fractured hip     RIGHT HIP    GERD (gastroesophageal reflux disease)     History of psychiatric hospitalization     Hyperlipidemia 02/02/1992    Hypertension     Hypocalcemia     Hyponatremia     Kidney stone     Migraine headache     Osteoporosis     Psoriatic arthritis 02/02/1992    Right knee pain     post knee replacement surgery (possible rejection of metal)    RLS (restless legs syndrome)     Schizophrenia 02/02/1992    stable on meds    Urinary tract infection      Additional medical history, including surgical history, is available in the patients electronic medical record.     UPDATED/RELEVANT NEUROLOGIC HISTORY  Falls: Denies recent falls.   TBI: Tripped over her dog and struck her head on a drum set 5-6 years ago without LOC. Required stitches at the ED and discharged the same day. Reports no cognitive sequelae following that event.   Seizures: None reported.  Stroke: None reported.  Movement Concerns: Shuffling gait in the morning.   Referral Diagnosis: R41.3 (ICD-10-CM) - Memory loss    NEUROIMAGING  Results for orders placed or performed during the hospital encounter of 08/22/20   MRI Brain Without Contrast        FINDINGS:  The craniocervical junction is within normal limits.  The sellar and parasellar structures are within normal limits.  The intracranial flow voids are within normal limits.    No  diffusion-weighted signal abnormality is present.  The ventricles and sulci are within normal limits for the patient's age.  There are minimal scattered T2/FLAIR signal hyperintensities within the periventricular white matter.  There are no extra-axial fluid collections.  There is no evidence of intracranial hemorrhage.  There is no evidence of mass effect.    There are postoperative changes in the orbits.  The paranasal sinuses unremarkable.  The mastoid air cells are clear.  The calvarium is intact.      Impression    No acute intracranial process       RECENT LABS  Lab Results   Component Value Date    [id number] >2000 (H) 03/30/2021     Lab Results   Component Value Date    RPR Non-reactive 11/12/2020     Lab Results   Component Value Date    FOLATE 17.7 03/30/2021     Lab Results   Component Value Date    TSH 2.265 03/30/2021     Lab Results   Component Value Date    HGBA1C 5.3 12/11/2018     Lab Results   Component Value Date    [id number] Negative 11/12/2020       CURRENT MEDICATIONS  Ms. Landry has a current medication list which includes the following prescription(s): albuterol, amlodipine, ascorbic acid (vitamin c), aspirin, atorvastatin, azelastine, b complex vitamins, benztropine, clopidogrel, cyanocobalamin, diclofenac sodium, fluticasone propionate, folic acid, gabapentin, isosorbide mononitrate, lisinopril, methotrexate, multivitamin, risperidone, risperidone, xeljanz xr, trazodone, venlafaxine, vitamin d, vitamin e, and zinc gluconate, and the following Facility-Administered Medications: sodium chloride 0.9%, sodium chloride 0.9%, lidocaine (pf) 10 mg/ml (1%), phenylephrine hcl 2.5%, proparacaine, and tropicamide 1%.     MENTAL STATUS AND OBSERVATIONS  Appearance: Casually dressed and adequate grooming/hygiene.   Alertness/orientation: Attentive and alert. She is fully oriented to person, place, time, and circumstance.  Gait/motor: Unremarkable.  Sensory: Unremarkable.  Speech/language: Normal in  "rate, rhythm, tone, and volume. Occasional word finding hesitancies were noted. Expressive and receptive language was normal.  Stated mood/affect: The patients stated mood was "normal." Affect appeared mildly restricted.    Interpersonal behavior: Rapport was quickly and easily established.   Thought processes/insight: Logical and goal-directed. Reponses to interview questions were somewhat concrete.  Test taking behavior and validity: She was frequently self-critical of her performance, even when she was performing within normal limits. Scores on stand-alone and embedded performance validity measures were within normal limits. The current results, therefore, are considered a valid reflection of the patient's current functioning.     PROCEDURES & RESULTS   Due to COVID-related safety precautions, this evaluation was conducted with masks worn by the evaluator and patient at all times. The standard administration of evaluation procedures does not include masks. The impact of applying non-standard administration methods has been evaluated only in part by scientific research. While every effort was made to simulate standard assessment practices, the diagnostic conclusions and recommendations for treatment provided in this report are being advanced with these limitations considered.     In addition to performing a review of pertinent medical records, reviewing limits to confidentiality, conducting a clinical interview, and explaining procedures, the following measures were administered: CITH; Dementia Rating Scale, Second Edition (DRS-2); Frontal Assessment Battery (MARIAJOSE); Test of Premorbid Functioning; Wechsler Adult Intelligence Scale, Fourth Edition (WAIS-IV) select subtests; Wechsler Memory Scale, Fourth Edition (WMS-IV) select subtests; Brief Visuospatial Memory Test, Revised (BVMT-R); Laith's Auditory Verbal Learning Test (AVLT; MOANS norms); Neuropsychological Assessment Battery (NAB) Naming test; Category fluency " (MOANS norms); Stroop Color Word Test (MOANS norms); Trail Making Test (MOANS norms); Clock Drawing Test and Copy; Generalized Anxiety Disorder Assessment (RASHIDA-7);  Geriatric Depression Scale (GDS-30); Manual norms were used unless otherwise indicated.      TEST RESULTS  GLOBAL COGNITIVE FUNCTION  On a multi-domain dementia screening measure (DRS-2), her performance is below average. She obtained a score of 127/144. She had difficulty with attention and concept formation subtests.    COGNITIVE BASELINE  Baseline cognitive function is estimated to be in the average range based on educational/occupational attainment.    ATTENTION & PROCESSING SPEED  Immediate auditory attention and working memory are average. Speeded digit symbol coding is average. Rapid number sequencing is low average with two consecutive sequencing errors. Rapid word reading is average and rapid color naming is below average.    EXECUTIVE FUNCTIONS  On a mental set shifting task with numbers and letters, her performance is below average with one set loss error. Clock drawing to command is within normal limits. Speed in completing a task requiring her to inhibit interference from competing stimuli is below average and likely related to her slow color naming speed. Performance is intact on a 3-step hand motor sequencing and go-no-go motor inhibition tasks. There is no evidence of stimulus boundedness. Responses to abstract verbal reasoning questions are frequently concrete.     LANGUAGE  Object naming is below average. Semantic fluency is average. Word reading is low average and may reflect longstanding reading difficulty.     VISUOSPATIAL/PERCEPTUAL  Visual analysis and assembly with 3-dimensional blocks is low average. Copy of a clock is notable for minor asymmetry in number placement.    MEMORY  Learning efficiency is low average on a 15-item word list. She made seven intrusion errors across trials. Free recall is average after a brief distractor  task, but she is unable to recall any word list items after a 20-minute delay. Recognition is below average. Performance on a story memory task is average, as is recognition of story elements. Performance on a learning and memory task with geometric figures below average, with low average recognition of figure elements. On this task, she anthony inaccurate reproductions of figures that she did not update or correct over learning trials. Learning is limited but she recalls most of the content she learns.     MOOD  Responses on self-report mood inventories suggest mild symptoms of anxiety and the absence of clinically significant symptoms of depression.       Raw Score Standardized Score Percentile/CP Descriptor   ACS LM II Rec 20 - - -   ACS RDS 9 - - -   CITH 10 - - -   PREMORBID FUNCTIONING Raw Score Standardized Score Percentile/CP Descriptor   TOPF simple dem. eFSIQ - 100 50 Average   TOPF pred. eFSIQ - 82 12 Low Average   TOPF simple + pred. eFSIQ - 90 25 Average   INTELLECTUAL FUNCTIONING Raw Score Standardized Score Percentile/CP Descriptor   WAIS-IV  N/A      Block Design 20 6 9 Low Average   Digit Span 23 9 37 Average         DS Forward 10 10 50 Average         DS Backward 7 9 37 Average         DS Sequence 6 8 25 Average         Longest Digit Forward 7 - - -         Longest Digit Backward 4 - - -         Longest Digit Sequence 5 - - -   Coding 42 8 25 Average   COGNITIVE SCREENING Raw Score Standardized Score Percentile/CP Descriptor   DRS-2        Attention 32 6 9 Low Average   Initiation/Perseveration 36 10 50 Average   Construction 6 10 50 Average   Conceptualization 30 6 9 Low Average   Memory 23 9 37 Average   Total Scale 127 5 5 Below Average   LANGUAGE FUNCTIONING Raw Score Standardized Score Percentile/CP Descriptor   TOPF Word Reading 21 81 10 Low Average   NAB Naming 27 36 8 Below Average   Animals/Fruits/Vegetable 39 8  Average   VISUOSPATIAL FUNCTIONING Raw Score Standardized Score Percentile/CP  Descriptor   WAIS-IV Block Design 20 6 9 Low Average   Clock Request 5 - 100 WNL   Clock Copy 4 - <8.5 Below Average   Clock Total 9 - 45.8 Average   BVMT-R Copy 11 - - -   LEARNING & MEMORY Raw Score Standardized Score Percentile/CP Descriptor   RAVLT        Trial 1 4 40 16 Low Average   Trial 2 7 44 27 Average   Trial 3 8 44 27 Average   Trial 4 9 43 25 Average   Trial 5 9 40 16 Low Average   Trials 1-5 Total 37 42 21 Low Average   List B 4 45 31 Average   Trial 6 (short delay) 9 51 54 Average   30-min Recall (long delay) 0 20 0.1 Exceptionally Low    Recognition Hits 7 - - -   Recognition FP Errors 1 - - -   Recognition Percentage Correct - 30 2 Below Average   WMS-IV Subtests        LM I 34 11 63 Average   LM II 18 10 50 Average   LM Recognition 20 - 51-75 Average   BVMT-R        IR 11 31 3 Below Average   DR 4 31 3 Below Average   Discrimination Index 5 - 11-16 Low Average   ATTENTION/WORKING MEMORY Raw Score Standardized Score Percentile/CP Descriptor   WAIS-IV Digit Span 23 9 37 Average         DS Forward 10 10 50 Average         DS Backward 7 9 37 Average         DS Sequence 6 8 25 Average         Longest Digit Forward 7 - - -         Longest Digit Backward 4 - - -         Longest Digit Sequence 5 - - -   MENTAL PROCESSING SPEED Raw Score Standardized Score Percentile/CP Descriptor   WAIS-IV Coding 42 8 25 Average   TMT A  55 6 9 Low Average   TMT A errors 2 - - -   Stroop: Word Reading 81 8 25 Average   Stroop: Color Naming 46 5 5 Below Average   EXECUTIVE FUNCTIONING Raw Score Standardized Score Percentile/CP Descriptor   TMT B 195 4 2 Below Average   TMT B errors 1 - - -   Stroop: C/W 19 5 5 Below Average   Stroop: Interference -10 N/A N/A N/A   MARIAJOSE 17  50 Average   Clock Request 5 - 100 WNL   MOOD & PERSONALITY Raw Score Standardized Score Percentile/CP Descriptor   GDS-30 8 - - WNL   RASHIDA-7 6 - - Mild   ss = scaled score (mean = 10, SD = 3); SS = standard score (mean = 100, SD = 15); Tscore mean = 50, SD  = 10; zscore (mean = 0.00, SD = 1)       It is important to note that scores/percentiles should only be interpreted by a neuropsychologist. It is common for healthy individuals to have 1-3 isolated low/unusual scores that are not indicative of any significant cognitive dysfunction.    BILLING     Service Description CPT Code Minutes Units   Test Evaluation Services --  --   Neuropsychological testing evaluation services by physician [phone]   Each additional hour by physician [phone]   Test Administration and Scoring --  --   Psychological or neuropsychological test administration and scoring by physician [id number]  0   Each additional 30 minutes by physician [id number]  0   Psychological or neuropsychological test administration and scoring by technician [id number]   Each additional 30 minutes by technician [id number]  5

## 2022-02-06 DIAGNOSIS — J30.9 ALLERGIC RHINITIS, UNSPECIFIED SEASONALITY, UNSPECIFIED TRIGGER: ICD-10-CM

## 2022-02-07 ENCOUNTER — PATIENT MESSAGE (OUTPATIENT)
Dept: NEUROLOGY | Facility: CLINIC | Age: 67
End: 2022-02-07
Payer: MEDICARE

## 2022-02-07 ENCOUNTER — OFFICE VISIT (OUTPATIENT)
Dept: RHEUMATOLOGY | Facility: CLINIC | Age: 67
End: 2022-02-07
Payer: MEDICARE

## 2022-02-07 VITALS
DIASTOLIC BLOOD PRESSURE: 72 MMHG | HEART RATE: 77 BPM | HEIGHT: 60 IN | SYSTOLIC BLOOD PRESSURE: 132 MMHG | WEIGHT: 105 LBS | BODY MASS INDEX: 20.62 KG/M2

## 2022-02-07 DIAGNOSIS — L40.50 PSORIATIC ARTHRITIS: ICD-10-CM

## 2022-02-07 DIAGNOSIS — M79.7 FIBROMYALGIA: ICD-10-CM

## 2022-02-07 DIAGNOSIS — M81.0 OSTEOPOROSIS, POST-MENOPAUSAL: ICD-10-CM

## 2022-02-07 DIAGNOSIS — M79.18 MYOFASCIAL PAIN SYNDROME: ICD-10-CM

## 2022-02-07 DIAGNOSIS — L40.50 PSA (PSORIATIC ARTHRITIS): Primary | ICD-10-CM

## 2022-02-07 DIAGNOSIS — M06.9 RHEUMATOID ARTHRITIS, INVOLVING UNSPECIFIED SITE, UNSPECIFIED WHETHER RHEUMATOID FACTOR PRESENT: ICD-10-CM

## 2022-02-07 PROCEDURE — 3078F DIAST BP <80 MM HG: CPT | Mod: CPTII,S$GLB,, | Performed by: INTERNAL MEDICINE

## 2022-02-07 PROCEDURE — 99213 PR OFFICE/OUTPT VISIT, EST, LEVL III, 20-29 MIN: ICD-10-PCS | Mod: S$GLB,,, | Performed by: INTERNAL MEDICINE

## 2022-02-07 PROCEDURE — 3075F PR MOST RECENT SYSTOLIC BLOOD PRESS GE 130-139MM HG: ICD-10-PCS | Mod: CPTII,S$GLB,, | Performed by: INTERNAL MEDICINE

## 2022-02-07 PROCEDURE — 3075F SYST BP GE 130 - 139MM HG: CPT | Mod: CPTII,S$GLB,, | Performed by: INTERNAL MEDICINE

## 2022-02-07 PROCEDURE — 1125F AMNT PAIN NOTED PAIN PRSNT: CPT | Mod: CPTII,S$GLB,, | Performed by: INTERNAL MEDICINE

## 2022-02-07 PROCEDURE — 3288F PR FALLS RISK ASSESSMENT DOCUMENTED: ICD-10-PCS | Mod: CPTII,S$GLB,, | Performed by: INTERNAL MEDICINE

## 2022-02-07 PROCEDURE — 1125F PR PAIN SEVERITY QUANTIFIED, PAIN PRESENT: ICD-10-PCS | Mod: CPTII,S$GLB,, | Performed by: INTERNAL MEDICINE

## 2022-02-07 PROCEDURE — 1159F PR MEDICATION LIST DOCUMENTED IN MEDICAL RECORD: ICD-10-PCS | Mod: CPTII,S$GLB,, | Performed by: INTERNAL MEDICINE

## 2022-02-07 PROCEDURE — 99499 UNLISTED E&M SERVICE: CPT | Mod: S$GLB,,, | Performed by: INTERNAL MEDICINE

## 2022-02-07 PROCEDURE — 99499 UNLISTED E&M SERVICE: CPT | Mod: S$GLB,,, | Performed by: STUDENT IN AN ORGANIZED HEALTH CARE EDUCATION/TRAINING PROGRAM

## 2022-02-07 PROCEDURE — 1101F PT FALLS ASSESS-DOCD LE1/YR: CPT | Mod: CPTII,S$GLB,, | Performed by: INTERNAL MEDICINE

## 2022-02-07 PROCEDURE — 1159F MED LIST DOCD IN RCRD: CPT | Mod: CPTII,S$GLB,, | Performed by: INTERNAL MEDICINE

## 2022-02-07 PROCEDURE — 99213 OFFICE O/P EST LOW 20 MIN: CPT | Mod: S$GLB,,, | Performed by: INTERNAL MEDICINE

## 2022-02-07 PROCEDURE — 99999 PR PBB SHADOW E&M-EST. PATIENT-LVL V: CPT | Mod: PBBFAC,,, | Performed by: INTERNAL MEDICINE

## 2022-02-07 PROCEDURE — 99499 RISK ADDL DX/OHS AUDIT: ICD-10-PCS | Mod: S$GLB,,, | Performed by: STUDENT IN AN ORGANIZED HEALTH CARE EDUCATION/TRAINING PROGRAM

## 2022-02-07 PROCEDURE — 99499 RISK ADDL DX/OHS AUDIT: ICD-10-PCS | Mod: S$GLB,,, | Performed by: INTERNAL MEDICINE

## 2022-02-07 PROCEDURE — 99999 PR PBB SHADOW E&M-EST. PATIENT-LVL V: ICD-10-PCS | Mod: PBBFAC,,, | Performed by: INTERNAL MEDICINE

## 2022-02-07 PROCEDURE — 1101F PR PT FALLS ASSESS DOC 0-1 FALLS W/OUT INJ PAST YR: ICD-10-PCS | Mod: CPTII,S$GLB,, | Performed by: INTERNAL MEDICINE

## 2022-02-07 PROCEDURE — 3078F PR MOST RECENT DIASTOLIC BLOOD PRESSURE < 80 MM HG: ICD-10-PCS | Mod: CPTII,S$GLB,, | Performed by: INTERNAL MEDICINE

## 2022-02-07 PROCEDURE — 3008F BODY MASS INDEX DOCD: CPT | Mod: CPTII,S$GLB,, | Performed by: INTERNAL MEDICINE

## 2022-02-07 PROCEDURE — 3288F FALL RISK ASSESSMENT DOCD: CPT | Mod: CPTII,S$GLB,, | Performed by: INTERNAL MEDICINE

## 2022-02-07 PROCEDURE — 3008F PR BODY MASS INDEX (BMI) DOCUMENTED: ICD-10-PCS | Mod: CPTII,S$GLB,, | Performed by: INTERNAL MEDICINE

## 2022-02-07 RX ORDER — SODIUM CHLORIDE 0.9 % (FLUSH) 0.9 %
10 SYRINGE (ML) INJECTION
Status: CANCELLED | OUTPATIENT
Start: 2022-02-14

## 2022-02-07 RX ORDER — ZOLEDRONIC ACID 5 MG/100ML
5 INJECTION, SOLUTION INTRAVENOUS
Status: CANCELLED | OUTPATIENT
Start: 2022-02-14

## 2022-02-07 RX ORDER — ZOLEDRONIC ACID 5 MG/100ML
5 INJECTION, SOLUTION INTRAVENOUS
Status: CANCELLED | OUTPATIENT
Start: 2022-02-07

## 2022-02-07 RX ORDER — HEPARIN 100 UNIT/ML
500 SYRINGE INTRAVENOUS
Status: CANCELLED | OUTPATIENT
Start: 2022-02-14

## 2022-02-07 RX ORDER — ACETAMINOPHEN 500 MG
500 TABLET ORAL
Status: CANCELLED | OUTPATIENT
Start: 2022-02-07

## 2022-02-07 RX ORDER — HEPARIN 100 UNIT/ML
500 SYRINGE INTRAVENOUS
Status: CANCELLED | OUTPATIENT
Start: 2022-02-07

## 2022-02-07 RX ORDER — SODIUM CHLORIDE 0.9 % (FLUSH) 0.9 %
10 SYRINGE (ML) INJECTION
Status: CANCELLED | OUTPATIENT
Start: 2022-02-07

## 2022-02-07 RX ORDER — METHOTREXATE 2.5 MG/1
10 TABLET ORAL
Qty: 48 TABLET | Refills: 0 | Status: SHIPPED | OUTPATIENT
Start: 2022-02-07 | End: 2022-04-07 | Stop reason: SDUPTHER

## 2022-02-07 RX ORDER — ACETAMINOPHEN 325 MG/1
650 TABLET ORAL
Status: CANCELLED | OUTPATIENT
Start: 2022-02-14

## 2022-02-07 RX ORDER — FLUTICASONE PROPIONATE 50 MCG
SPRAY, SUSPENSION (ML) NASAL
Qty: 48 G | Refills: 0 | Status: SHIPPED | OUTPATIENT
Start: 2022-02-07 | End: 2022-11-08

## 2022-02-07 ASSESSMENT — ROUTINE ASSESSMENT OF PATIENT INDEX DATA (RAPID3)
FATIGUE SCORE: 6
MDHAQ FUNCTION SCORE: 0.4
AM STIFFNESS SCORE: 1, YES
PATIENT GLOBAL ASSESSMENT SCORE: 5.5
TOTAL RAPID3 SCORE: 4.61
WHEN YOU AWAKENED IN THE MORNING OVER THE LAST WEEK, PLEASE INDICATE THE AMOUNT OF TIME IT TAKES UNTIL YOU ARE AS LIMBER AS YOU WILL BE FOR THE DAY: 45
PAIN SCORE: 7
PSYCHOLOGICAL DISTRESS SCORE: 1.1

## 2022-02-07 NOTE — PROGRESS NOTES
I have personally taken the history and examined the patient and agree with the resident,s note as stated above         Cervical demyelination with etanercept in past, no anti-TNF  Failed secukinumab  Failed sulfasalazine  Does not feel tofacitinib helping, no different off last 2 wks than prior  Still psoriasis         PsA  TJ 10 SJ 4  Pt global 55  ESR 3  CRP < 0.3  DAS28  3.87 [id number] 4.16   CDAI 21.5(all MDA)  DAPSA  TJ 10 SJ 4  Pt global 5.5  Pt pain 7  CRP <0.03 = 26.5  Osteoporosis DXA 9/10/20 FRAX major 20.6% hip 4.6%, but hip fracture= osteoporosis most recent dose of zoledronic acid 9/24/20  Psoriasis, minimal heels only   OA        * 4th dose Moderna Covid vaccine due 2/24/22. Hold methotrexate 1 week after then resume  *Schedule zoledronic acid due now, reordered  Finish tofacitinib supply then stop  Change to guselkumab 100mg sc wk 0,4 then q 8 wks. Sent to [facility]  Discussed risks and benefits, medication information sheet provided.   Methotrexate 10mg once a week with folic acid 1mg daily  F/u Dr. Gonzalez in Sports Medicine who did revision arthroplasty right knee 8/28/14  RTC 3 months with standing labs          Progress Notes

## 2022-02-07 NOTE — PROGRESS NOTES
Subjective:       Patient ID: Bhavna Landry is a 66 y.o. female.    Chief Complaint: guttate psoriasis, psoriatic arthritis    HPI     The patient is a 65 year old female with a PMH of guttate psoriasis, psoriatic arthritis, osteoporosis s/p zolendronic acid (9/24/2020), OA s/p failed right TKA, lumbar spondylosis on diclofenac gel, Effexor, and gabapentin 400mg nightly, schizophrenia, and paroxysmal Afib who presents for follow up. She is on Xeljanz 11mg QD and methotrexate 10mg every 7 days.  She was last seen 10/25/21. At that time, she was complaining of bilateral ankle swelling and intermittent right knee pain for which she was referred to Sports Medicine.    Today, she presents for f/u. She reports doing okay since last visit. She is having about 45 minutes of morning stiffness. She has not received zolendronic acid yet due to insurance not covering it. Has not seen Dr. Gonzalez yet for her knee pain however is unchanged from last visit, still happens about 3 times a week when walking about 1 mile. Having bilateral thumb/wrist pain for about 1 month. Voltaren gel has not helped. Having difficulty using buttons. All finger are swollen which is helped with warmth. Left ankle is swollen still, unchanged from last visit. Having psoriasis on bilateral heels. Had Moh's surgery 2 weeks ago for which she held the Xeljanz and Methotrexate for but is restarted yesterday.     Exercise: walks everyday for 1 mile.     Review of Systems   Constitutional: Positive for fatigue. Negative for activity change, appetite change and fever.   HENT: Negative for mouth sores and sore throat.    Eyes: Negative for pain, redness and visual disturbance.   Respiratory: Negative for cough and shortness of breath.    Cardiovascular: Negative for chest pain, palpitations and leg swelling.   Gastrointestinal: Negative for abdominal pain, constipation, diarrhea, nausea and vomiting.   Genitourinary: Negative for dysuria and hematuria.    Musculoskeletal: Positive for arthralgias. Negative for joint swelling.   Skin: Negative for rash.   Neurological: Positive for headaches. Negative for weakness and numbness.   Hematological: Bruises/bleeds easily.   Psychiatric/Behavioral: Positive for sleep disturbance. Negative for confusion and decreased concentration.         Objective:   /72   Pulse 77   Ht 5' (1.524 m)   Wt 47.6 kg (105 lb)   BMI 20.51 kg/m²      Physical Exam   Constitutional: No distress.   HENT:   Head: Normocephalic and atraumatic.   Mouth/Throat: No oropharyngeal exudate.   Eyes: Conjunctivae are normal. Right eye exhibits no discharge. Left eye exhibits no discharge. No scleral icterus.   Cardiovascular: Normal rate, regular rhythm and normal heart sounds. Exam reveals no gallop and no friction rub.   No murmur heard.  Pulmonary/Chest: Effort normal and breath sounds normal.   Abdominal: Soft. Bowel sounds are normal.   Musculoskeletal:      Cervical back: Normal range of motion and neck supple.      Comments: B/i SIJ tenderness  + HELIO, + FADIR  Left medial malleolus TTP   Skin: Skin is warm and dry. She is not diaphoretic.   Psoriatic rash located bilateral calcaneus        Right Side Rheumatological Exam     Muscle Strength (0-5 scale):  Deltoid:  5  Biceps: 5/5   Triceps:  5  : 5/5   Iliopsoas: 5  Quadriceps:  5   Distal Lower Extremity: 5    Left Side Rheumatological Exam     Muscle Strength (0-5 scale):  Deltoid:  5  Biceps: 5/5   Triceps:  5  :  5/5   Iliopsoas: 5  Quadriceps:  5   Distal Lower Extremity: 5            1/13/2021 5/17/2021 10/25/2021 2/7/2022   Tender (CLARK-28) 2 / 28  0 / 28  0 / 28  10 / 28    Swollen (CLARK-28) 1 / 28  0 / 28 1 / 28 4 / 28    Provider Global 20 mm -- 0 mm 20 mm   Patient Global 20 mm 6 mm 50 mm 55 mm   ESR 2 mm/hr 2 mm/hr -- 3 mm/hr   CRP 0.2 mg/L 0.2 mg/L -- 0.3 mg/L   CLARK-28 (ESR) 1.84 (Remission) 0.57 (Remission) -- 3.87 (Moderate disease activity)   CLRAK-28 (CRP) 2.38  (Remission) 1.11 (Remission) -- 4.16 (Moderate disease activity)   CDAI Score 3  -- 6  21.5        Lab Visit on 02/02/2022   Component Date Value Ref Range Status    Sodium, Urine 02/02/2022 55  20 - 250 mmol/L Final    Comment: The random urine reference ranges provided were established   for 24 hour urine collections.  No reference ranges exist for  random urine specimens.  Correlate clinically.      Osmolality, Urine 02/02/2022 258  50 - 1200 mOsm/kg Final    Comment: The random urine reference ranges provided were established   for 24 hour urine collections.  No reference ranges exist for  random urine specimens.  Correlate clinically.     Lab Visit on 02/02/2022   Component Date Value Ref Range Status    Sed Rate 02/02/2022 3  0 - 36 mm/Hr Final    CRP 02/02/2022 <0.3  0.0 - 8.2 mg/L Final    WBC 02/02/2022 9.21  3.90 - 12.70 K/uL Final    RBC 02/02/2022 4.28  4.00 - 5.40 M/uL Final    Hemoglobin 02/02/2022 13.6  12.0 - 16.0 g/dL Final    Hematocrit 02/02/2022 41.4  37.0 - 48.5 % Final    MCV 02/02/2022 97  82 - 98 fL Final    MCH 02/02/2022 31.8* 27.0 - 31.0 pg Final    MCHC 02/02/2022 32.9  32.0 - 36.0 g/dL Final    RDW 02/02/2022 14.9* 11.5 - 14.5 % Final    Platelets 02/02/2022 342  150 - 450 K/uL Final    MPV 02/02/2022 9.0* 9.2 - 12.9 fL Final    Immature Granulocytes 02/02/2022 0.3  0.0 - 0.5 % Final    Gran # (ANC) 02/02/2022 6.2  1.8 - 7.7 K/uL Final    Immature Grans (Abs) 02/02/2022 0.03  0.00 - 0.04 K/uL Final    Comment: Mild elevation in immature granulocytes is non specific and   can be seen in a variety of conditions including stress response,   acute inflammation, trauma and pregnancy. Correlation with other   laboratory and clinical findings is essential.      Lymph # 02/02/2022 1.9  1.0 - 4.8 K/uL Final    Mono # 02/02/2022 0.9  0.3 - 1.0 K/uL Final    Eos # 02/02/2022 0.2  0.0 - 0.5 K/uL Final    Baso # 02/02/2022 0.08  0.00 - 0.20 K/uL Final    nRBC 02/02/2022 0  0  /100 WBC Final    Gran % 02/02/2022 67.0  38.0 - 73.0 % Final    Lymph % 02/02/2022 20.2  18.0 - 48.0 % Final    Mono % 02/02/2022 9.2  4.0 - 15.0 % Final    Eosinophil % 02/02/2022 2.4  0.0 - 8.0 % Final    Basophil % 02/02/2022 0.9  0.0 - 1.9 % Final    Differential Method 02/02/2022 Automated   Final    Sodium 02/02/2022 136  136 - 145 mmol/L Final    Potassium 02/02/2022 4.5  3.5 - 5.1 mmol/L Final    Chloride 02/02/2022 100  95 - 110 mmol/L Final    CO2 02/02/2022 23  23 - 29 mmol/L Final    Glucose 02/02/2022 89  70 - 110 mg/dL Final    BUN 02/02/2022 6* 8 - 23 mg/dL Final    Creatinine 02/02/2022 0.8  0.5 - 1.4 mg/dL Final    Calcium 02/02/2022 9.3  8.7 - 10.5 mg/dL Final    Total Protein 02/02/2022 6.0  6.0 - 8.4 g/dL Final    Albumin 02/02/2022 3.6  3.5 - 5.2 g/dL Final    Total Bilirubin 02/02/2022 0.3  0.1 - 1.0 mg/dL Final    Comment: For infants and newborns, interpretation of results should be based  on gestational age, weight and in agreement with clinical  observations.    Premature Infant recommended reference ranges:  Up to 24 hours.............<8.0 mg/dL  Up to 48 hours............<12.0 mg/dL  3-5 days..................<15.0 mg/dL  6-29 days.................<15.0 mg/dL      Alkaline Phosphatase 02/02/2022 65  55 - 135 U/L Final    AST 02/02/2022 21  10 - 40 U/L Final    ALT 02/02/2022 14  10 - 44 U/L Final    Anion Gap 02/02/2022 13  8 - 16 mmol/L Final    eGFR if African American 02/02/2022 >60.0  >60 mL/min/1.73 m^2 Final    eGFR if non African American 02/02/2022 >60.0  >60 mL/min/1.73 m^2 Final    Comment: Calculation used to obtain the estimated glomerular filtration  rate (eGFR) is the CKD-EPI equation.       Sodium 02/02/2022 136  136 - 145 mmol/L Final    Potassium 02/02/2022 4.5  3.5 - 5.1 mmol/L Final    Chloride 02/02/2022 100  95 - 110 mmol/L Final    CO2 02/02/2022 23  23 - 29 mmol/L Final    Glucose 02/02/2022 89  70 - 110 mg/dL Final    BUN 02/02/2022 6*  8 - 23 mg/dL Final    Creatinine 02/02/2022 0.8  0.5 - 1.4 mg/dL Final    Calcium 02/02/2022 9.3  8.7 - 10.5 mg/dL Final    Anion Gap 02/02/2022 13  8 - 16 mmol/L Final    eGFR if African American 02/02/2022 >60.0  >60 mL/min/1.73 m^2 Final    eGFR if non African American 02/02/2022 >60.0  >60 mL/min/1.73 m^2 Final    Comment: Calculation used to obtain the estimated glomerular filtration  rate (eGFR) is the CKD-EPI equation.       Osmolality 02/02/2022 288  275 - 295 mOsm/kg Final         Assessment:       1. PSA (psoriatic arthritis)    2. Fibromyalgia    3. Myofascial pain syndrome    4. Osteoporosis, post-menopausal    5. Rheumatoid arthritis, involving unspecified site, unspecified whether rheumatoid factor present        The patient is a 65 year old female with a PMH of guttate psoriasis, psoriatic arthritis, osteopenia with elevated FRAX and history of ?pelvic fragility fracture s/p zolendronic acid (9/24/20), OA s/p failed right TKA, lumbar spondylosis on diclofenac gel, Effexor, and gabapentin 400mg nightly, schizophrenia, and paroxysmal Afib who presents for follow up. She is on Xeljanz 11mg once daily and Methotrexate 10mg, 4 tabs every 7 days, previously cervical demyelination with etanercept in past, Failed secukinumab, Failed sulfasalazine.     Plan:       Problem List Items Addressed This Visit        Orthopedic    PSA (psoriatic arthritis) - Primary    Fibromyalgia    Myofascial pain syndrome    Osteoporosis, post-menopausal    Rheumatoid arthritis        - Discontinue Xeljanz as she is not experiencing much benefit now and higher side effect risk, start Tremfya. Patient can finish remaining Xeljanz at home before starting Tremfya  - Continue Methotrexate 10 mg  - Continue Vitamin D3 1000 units once daily  - Schedule zoledronic acid due now   - Referral to Sports medicine for right knee pain s/p TKA   - standing labs, vitamin D ordered today   - advised to receive 4th booster dose of COVID  vaccine at the end of this month     RTC in 3 months     Jonathan Wayne MD  [employer] PM[facility]R PGY-1    I have reviewed and concur with the resident's history, physical, assessment, and plan.  I have personally interviewed and examined the patient at bedside.  See below addendum for my evaluation and additional findings.

## 2022-02-07 NOTE — PATIENT INSTRUCTIONS
Finish remaining Xeljanz at home. Then begin taken Tremfya.   Continue Methotrexate 10 mg once daily.  Due for Reclast dose.  Follow up with Dr. Gonzalez in Sports Medicine for knee pain  Follow up in 3 months with labs.

## 2022-02-09 ENCOUNTER — TELEPHONE (OUTPATIENT)
Dept: NEUROLOGY | Facility: CLINIC | Age: 67
End: 2022-02-09
Payer: MEDICARE

## 2022-02-09 ENCOUNTER — OFFICE VISIT (OUTPATIENT)
Dept: NEUROLOGY | Facility: CLINIC | Age: 67
End: 2022-02-09
Payer: MEDICARE

## 2022-02-09 ENCOUNTER — OFFICE VISIT (OUTPATIENT)
Dept: DERMATOLOGY | Facility: CLINIC | Age: 67
End: 2022-02-09
Payer: MEDICARE

## 2022-02-09 ENCOUNTER — TELEPHONE (OUTPATIENT)
Dept: NEPHROLOGY | Facility: CLINIC | Age: 67
End: 2022-02-09
Payer: MEDICARE

## 2022-02-09 ENCOUNTER — TELEPHONE (OUTPATIENT)
Dept: RHEUMATOLOGY | Facility: CLINIC | Age: 67
End: 2022-02-09
Payer: MEDICARE

## 2022-02-09 DIAGNOSIS — F41.9 ANXIETY: ICD-10-CM

## 2022-02-09 DIAGNOSIS — F20.0 PARANOID SCHIZOPHRENIA: Primary | ICD-10-CM

## 2022-02-09 DIAGNOSIS — L40.50 PSA (PSORIATIC ARTHRITIS): Primary | ICD-10-CM

## 2022-02-09 DIAGNOSIS — Z09 POSTOP CHECK: Primary | ICD-10-CM

## 2022-02-09 PROCEDURE — 1160F RVW MEDS BY RX/DR IN RCRD: CPT | Mod: CPTII,S$GLB,, | Performed by: DERMATOLOGY

## 2022-02-09 PROCEDURE — 99499 NO LOS: ICD-10-PCS | Mod: 95,,, | Performed by: STUDENT IN AN ORGANIZED HEALTH CARE EDUCATION/TRAINING PROGRAM

## 2022-02-09 PROCEDURE — 99499 UNLISTED E&M SERVICE: CPT | Mod: 95,,, | Performed by: STUDENT IN AN ORGANIZED HEALTH CARE EDUCATION/TRAINING PROGRAM

## 2022-02-09 PROCEDURE — 99999 PR PBB SHADOW E&M-EST. PATIENT-LVL I: CPT | Mod: PBBFAC,,, | Performed by: DERMATOLOGY

## 2022-02-09 PROCEDURE — 1159F MED LIST DOCD IN RCRD: CPT | Mod: CPTII,S$GLB,, | Performed by: DERMATOLOGY

## 2022-02-09 PROCEDURE — 99024 POSTOP FOLLOW-UP VISIT: CPT | Mod: S$GLB,,, | Performed by: DERMATOLOGY

## 2022-02-09 PROCEDURE — 99024 PR POST-OP FOLLOW-UP VISIT: ICD-10-PCS | Mod: S$GLB,,, | Performed by: DERMATOLOGY

## 2022-02-09 PROCEDURE — 1160F PR REVIEW ALL MEDS BY PRESCRIBER/CLIN PHARMACIST DOCUMENTED: ICD-10-PCS | Mod: CPTII,S$GLB,, | Performed by: DERMATOLOGY

## 2022-02-09 PROCEDURE — 99999 PR PBB SHADOW E&M-EST. PATIENT-LVL I: ICD-10-PCS | Mod: PBBFAC,,, | Performed by: DERMATOLOGY

## 2022-02-09 PROCEDURE — 1159F PR MEDICATION LIST DOCUMENTED IN MEDICAL RECORD: ICD-10-PCS | Mod: CPTII,S$GLB,, | Performed by: DERMATOLOGY

## 2022-02-09 RX ORDER — TOFACITINIB 11 MG/1
11 TABLET, FILM COATED, EXTENDED RELEASE ORAL DAILY
Qty: 30 TABLET | Refills: 2 | OUTPATIENT
Start: 2022-02-09 | End: 2022-04-05

## 2022-02-09 NOTE — TELEPHONE ENCOUNTER
----- Message from Alexys Kessler sent at 2/9/2022 12:19 PM CST -----  Type:  Patient Requesting call back    Who Called:Bhavna Pierre  Would the patient rather a call back or a response via [facility]?   Best Call Back Number:[phone]  Additional Information:   Patient Requesting call back regarding dementia test results and would like doctor to look at it.

## 2022-02-09 NOTE — TELEPHONE ENCOUNTER
Pt wishes to continue Xeljanz and feels flare was d/t being off 2 wks prior to visit. Although pt told us did not notice any difference on or off the medication. Will cancel Tremfya and reorder Xeljanz and will re-assess in 3  Months.

## 2022-02-09 NOTE — TELEPHONE ENCOUNTER
----- Message from Sander Villa MD sent at 2/8/2022  9:53 PM CST -----  Regarding: RE: results  Contact: pt [phone]  Please inform her that her lab tests returned normal. No change in treatment.    ----- Message -----  From: Tnaia Guidry MA  Sent: 2/8/2022   4:25 PM CST  To: Sander Villa MD  Subject: FW: results                                        ----- Message -----  From: Christy Owusu  Sent: 2/8/2022   9:30 AM CST  To: [facility] Staff  Subject: results                                          Pt calling to speak to someone in Dr. Villa office in regards to urine results. Please call

## 2022-02-09 NOTE — PROGRESS NOTES
NEUROPSYCHOLOGICAL EVALUATION FEEDBACK    Referral Information  Name: Bhavna Landry  MRN: [id number]  Age: 66 y.o.    : 1955  Race: White    Gender: female        Chief complaint leading to consultation/medical necessity: Feedback from neuropsychological evaluation.  Established Patient - Telehealth Visit   Patient location: Patient's home in [place], [place].  Visit type:Virtual visit with audio only (telephone)  The reason for the audio only service rather than synchronous audio and video virtual visit was related to technical difficulties or patient preference/necessity.  Total time spent with patient: 30 minutes.  Billin attached to testing visit on 22.  Each patient to whom he or she provides medical services by telemedicine is: (1) informed of the relationship between the physician and patient and the respective role of any other health care provider with respect to management of the patient; and (2) notified that he or she may decline to receive medical services by telemedicine and may withdraw from such care at any time.   Consent/Emergency Plan: The patient expressed an understanding of the purpose of the evaluation and consented to all procedures. I informed the patient of limits to confidentiality and discussed an emergency plan.    FEEDBACK NOTE       Bhavna Landry attended a feedback session today.  We discussed the results of the neuropsychological evaluation (30 minutes).  I provided Ms. Landry with a copy of the evaluation report and gave time to discuss questions and concerns.    Isis Jeter, Ph.D.  Licensed Clinical Neuropsychologist  [facility] - Department of Neurology        This service was not originating from a related E/M service provided within the previous 7 days nor will  to an E/M service or procedure within the next 24 hours or my soonest available appointment.  Prevailing standard of care was able to be met in this audio-only visit.

## 2022-02-10 ENCOUNTER — TELEPHONE (OUTPATIENT)
Dept: NEUROLOGY | Facility: CLINIC | Age: 67
End: 2022-02-10
Payer: MEDICARE

## 2022-02-10 ENCOUNTER — PATIENT MESSAGE (OUTPATIENT)
Dept: PSYCHIATRY | Facility: CLINIC | Age: 67
End: 2022-02-10
Payer: MEDICARE

## 2022-02-10 NOTE — TELEPHONE ENCOUNTER
----- Message from Jose Bai DO sent at 2/10/2022  9:55 AM CST -----  Schedule a follow up if she doesn't have one already      ----- Message -----  From: Niles Ruff MA  Sent: 2/9/2022  12:42 PM CST  To: Jose Bai DO    Spoke with patient , patient stated her doctor said its not dementia and notes are in her chart  ----- Message -----  From: Alexys Kessler  Sent: 2/9/2022  12:22 PM CST  To: Bhumika Garcia Staff    Type:  Patient Requesting call back    Who Called:Bhavna Pierre  Would the patient rather a call back or a response via [facility]?   Best Call Back Number:[phone]  Additional Information:   Patient Requesting call back regarding dementia test results and would like doctor to look at it.

## 2022-02-14 ENCOUNTER — OFFICE VISIT (OUTPATIENT)
Dept: PSYCHIATRY | Facility: CLINIC | Age: 67
End: 2022-02-14
Payer: MEDICARE

## 2022-02-14 DIAGNOSIS — G31.84 MILD COGNITIVE IMPAIRMENT WITH MEMORY LOSS: ICD-10-CM

## 2022-02-14 DIAGNOSIS — F41.9 ANXIETY: ICD-10-CM

## 2022-02-14 DIAGNOSIS — R29.818 EXTRAPYRAMIDAL SYMPTOM: ICD-10-CM

## 2022-02-14 DIAGNOSIS — F20.0 PARANOID SCHIZOPHRENIA: Primary | ICD-10-CM

## 2022-02-14 DIAGNOSIS — F33.42 RECURRENT MAJOR DEPRESSIVE DISORDER, IN FULL REMISSION: ICD-10-CM

## 2022-02-14 PROCEDURE — 99214 OFFICE O/P EST MOD 30 MIN: CPT | Mod: 95,,, | Performed by: INTERNAL MEDICINE

## 2022-02-14 PROCEDURE — 1160F RVW MEDS BY RX/DR IN RCRD: CPT | Mod: CPTII,95,, | Performed by: INTERNAL MEDICINE

## 2022-02-14 PROCEDURE — 99499 RISK ADDL DX/OHS AUDIT: ICD-10-PCS | Mod: 95,,, | Performed by: INTERNAL MEDICINE

## 2022-02-14 PROCEDURE — 1159F MED LIST DOCD IN RCRD: CPT | Mod: CPTII,95,, | Performed by: INTERNAL MEDICINE

## 2022-02-14 PROCEDURE — 1160F PR REVIEW ALL MEDS BY PRESCRIBER/CLIN PHARMACIST DOCUMENTED: ICD-10-PCS | Mod: CPTII,95,, | Performed by: INTERNAL MEDICINE

## 2022-02-14 PROCEDURE — 99214 PR OFFICE/OUTPT VISIT, EST, LEVL IV, 30-39 MIN: ICD-10-PCS | Mod: 95,,, | Performed by: INTERNAL MEDICINE

## 2022-02-14 PROCEDURE — 99499 UNLISTED E&M SERVICE: CPT | Mod: 95,,, | Performed by: INTERNAL MEDICINE

## 2022-02-14 PROCEDURE — 1159F PR MEDICATION LIST DOCUMENTED IN MEDICAL RECORD: ICD-10-PCS | Mod: CPTII,95,, | Performed by: INTERNAL MEDICINE

## 2022-02-14 NOTE — PROGRESS NOTES
OUTPATIENT PSYCHIATRY RETURN VISIT    ENCOUNTER DATE:  2/21/2022  SITE:  [facility], [facility]  LENGTH OF SESSION:  10 minutes    The patient location is:  [place]  The chief complaint leading to consultation is:  Follow up omod    Visit type:  audiovisual (doximity)    Face to Face time with patient:  10 minutes  15 minutes of total time spent on the encounter, which includes face to face time and non-face to face time preparing to see the patient (eg, review of tests), Obtaining and/or reviewing separately obtained history, Documenting clinical information in the electronic or other health record, Independently interpreting results (not separately reported) and communicating results to the patient/family/caregiver, or Care coordination (not separately reported).     Each patient to whom he or she provides medical services by telemedicine is:  (1) informed of the relationship between the physician and patient and the respective role of any other health care provider with respect to management of the patient; and (2) notified that he or she may decline to receive medical services by telemedicine and may withdraw from such care at any time.    CHIEF COMPLAINT:  Mood and Follow-up      HISTORY OF PRESENTING ILLNESS:  Bhavna Landry is a 66 y.o. female with history of Paranoid Schizophrenia, Depression, and Insomnia who presents for follow up appointment.  Patient is accompanied by her daughter today.    Plan at last appointment on 12/13/2021:  · Symptoms currently stable.  · Continue Risperdal 2mg/4mg, Effexor 75mg daily, Neurontin 300mg qHS, and Cogentin 0.5mg BID.  · Referred to Neurology and Neuropsychology at last appointment for memory loss.    · Hyponatremia is being managed by Nephrology.  · Discussed with patient and daughter informed consent, risks versus benefits, alternative treatments, side effect profile and the inherent unpredictability of individual responses to these treatments.  The  patient's daughter expresses understanding of the above and displays the capacity to agree with this current plan.    History as told by patient:  Says she has been doing well.  Denies depression, anxiety, or paranoia.  Completed memory testing which was consistent with mild cognitive impairment, likely due to schizophrenia rather than new onset dementia.  Walking 1 mile per day.  Daughter agrees she is doing very well.  Sleeping well.      Medication side effects:  As above  Medication compliance:  Yes    PSYCHIATRIC REVIEW OF SYSTEMS:  Trouble with sleep:  Denies   Appetite changes:  Denies  Weight changes:  Denies  Lack of energy:  Denies  Anhedonia:  Denies  Somatic symptoms:  Denies  Libido:  Denies  Anxiety/panic:  Denies  Guilty/hopeless:  Denies  Self-injurious behavior/risky behavior:  Denies  Any drugs:  Denies  Alcohol:  Denies    MEDICAL REVIEW OF SYSTEMS:  Complete review of systems performed covering Constitutional, Musculoskeletal, Neurologic.  All systems negative except for that covered in HPI.    PAST PSYCHIATRIC, MEDICAL, AND SOCIAL HISTORY REVIEWED  The patient's past medical, family and social history have been reviewed and updated as appropriate within the electronic medical record - see encounter notes.    MEDICATIONS:    Current Outpatient Medications:     albuterol (PROAIR HFA) 90 mcg/actuation inhaler, 2 puffs qid prn, Disp: 54 g, Rfl: 3    amLODIPine (NORVASC) 10 MG tablet, Take 1 tablet (10 mg total) by mouth once daily., Disp: 30 tablet, Rfl: 11    ascorbic acid, vitamin C, (VITAMIN C) 500 MG tablet, Take 500 mg by mouth once daily., Disp: , Rfl:     aspirin (ECOTRIN) 81 MG EC tablet, Take 1 tablet (81 mg total) by mouth once daily., Disp: 360 tablet, Rfl: 0    atorvastatin (LIPITOR) 80 MG tablet, Take 1 tablet (80 mg total) by mouth once daily., Disp: 90 tablet, Rfl: 3    azelastine (ASTELIN) 137 mcg (0.1 %) nasal spray, 1 spray (137 mcg total) by Nasal route 2 (two) times daily.,  Disp: 30 mL, Rfl: 0    b complex vitamins tablet, Take 1 tablet by mouth once daily., Disp: , Rfl:     benztropine (COGENTIN) 0.5 MG tablet, Take 1 tablet (0.5 mg total) by mouth 2 (two) times daily., Disp: 180 tablet, Rfl: 3    clopidogreL (PLAVIX) 75 mg tablet, Take 75 mg by mouth once daily., Disp: , Rfl:     cyanocobalamin 500 MCG tablet, Take 500 mcg by mouth once daily., Disp: , Rfl:     diclofenac sodium (VOLTAREN) 1 % Gel, Apply 2 g topically 4 (four) times daily as needed., Disp: 3 each, Rfl: 3    fluticasone propionate (FLONASE) 50 mcg/actuation nasal spray, SHAKE LIQUID WELL AND USE 1 SPRAY IN EACH NOSTRIL EVERY DAY, Disp: 48 g, Rfl: 0    folic acid (FOLVITE) 1 MG tablet, Take 1 tablet (1 mg total) by mouth once daily., Disp: 90 tablet, Rfl: 3    gabapentin (NEURONTIN) 300 MG capsule, Take 1 capsule (300 mg total) by mouth every evening., Disp: 90 capsule, Rfl: 3    isosorbide mononitrate (IMDUR) 30 MG 24 hr tablet, Take 1 tablet (30 mg total) by mouth once daily., Disp: 30 tablet, Rfl: 11    lisinopriL (PRINIVIL,ZESTRIL) 5 MG tablet, Take 2 tablets (10 mg total) by mouth once daily., Disp: 180 tablet, Rfl: 0    methotrexate 2.5 MG Tab, Take 4 tablets (10 mg total) by mouth every 7 days., Disp: 48 tablet, Rfl: 0    multivitamin (THERAGRAN) per tablet, Take 1 tablet by mouth once daily., Disp: , Rfl:     risperiDONE (RISPERDAL) 2 MG tablet, Take 1 tablet (2 mg total) by mouth every morning., Disp: 90 tablet, Rfl: 3    risperiDONE (RISPERDAL) 4 MG tablet, TAKE 1 TABLET(4 MG) BY MOUTH EVERY EVENING, Disp: 90 tablet, Rfl: 3    tofacitinib (XELJANZ XR) 11 mg Tb24, Take 11 mg by mouth once daily., Disp: 30 tablet, Rfl: 2    traZODone (DESYREL) 50 MG tablet, Take 1 tablet (50 mg total) by mouth nightly as needed for Insomnia., Disp: 90 tablet, Rfl: 3    venlafaxine (EFFEXOR-XR) 75 MG 24 hr capsule, TAKE 1 CAPSULE(75 MG) BY MOUTH EVERY DAY, Disp: 30 capsule, Rfl: 11    vitamin D (VITAMIN D3)  "1000 units Tab, Take 1,000 Units by mouth once daily., Disp: , Rfl:     vitamin E 200 UNIT capsule, Take 200 Units by mouth once daily., Disp: , Rfl:     zinc gluconate 50 mg tablet, Take 50 mg by mouth once daily., Disp: , Rfl:   No current facility-administered medications for this visit.    Facility-Administered Medications Ordered in Other Visits:     0.9%  NaCl infusion, 500 mL, Intravenous, Continuous, Michelle Pineda Jr., MD    0.9%  NaCl infusion, 500 mL, Intravenous, Continuous, Michelle Pineda Jr., MD    lidocaine (PF) 10 mg/ml (1%) injection 10 mg, 1 mL, Intradermal, Once, Michelle Pineda Jr., MD    phenylephrine HCL 2.5% ophthalmic solution 1 drop, 1 drop, Right Eye, On Call Procedure, Karen Song MD, 1 drop at 08/01/19 0648    proparacaine 0.5 % ophthalmic solution 1 drop, 1 drop, Right Eye, On Call Procedure, Karen Song MD, 1 drop at 08/01/19 0638    tropicamide 1% ophthalmic solution 1 drop, 1 drop, Right Eye, On Call Procedure, Karen Song MD, 1 drop at 08/01/19 0648    ALLERGIES:  Review of patient's allergies indicates:   Allergen Reactions    Etanercept Other (See Comments)     Other reaction(s): recurrent infections    Chloramphenicol sod succinate Hives and Other (See Comments)    Codeine Other (See Comments)     Other reaction(s): Stomach upset    Nickel sutures [surgical stainless steel] Dermatitis     Allergic contact dermatitis    Adhesive Rash     Other reaction(s): Rash       PSYCHIATRIC EXAM:  There were no vitals filed for this visit.  Appearance:  Well groomed, appearing healthy and of stated age  Behavior:  Cooperative, pleasant, no psychomotor agitation or retardation  Speech:  Normal rate, rhythm, prosody, and volume  Mood:  "Good"  Affect:  Euthymic  Thought Process:  Linear, logical, goal directed  Thought Content:  Negative for suicidal ideation, homicidal ideation, delusions or hallucinations.  No paranoia.    Associations:  Intact  Memory:  " Fair  Level of Consciousness/Orientation:  Grossly intact  Fund of Knowledge:  Fair  Attention:  Good  Language:  Fluent, able to name abstract and concrete objects  Insight:  Fair  Judgment:  Intact  Psychomotor signs:  Mild ivet buccal movements, no tremor  Gait:  Unable to assess via virtual visit      RELEVANT LABS/STUDIES:    AIMS completed 12/13/21    Lab Results   Component Value Date    WBC 9.21 02/02/2022    HGB 13.6 02/02/2022    HCT 41.4 02/02/2022    MCV 97 02/02/2022     02/02/2022     BMP  Lab Results   Component Value Date     02/02/2022     02/02/2022    K 4.5 02/02/2022    K 4.5 02/02/2022     02/02/2022     02/02/2022    CO2 23 02/02/2022    CO2 23 02/02/2022    BUN 6 (L) 02/02/2022    BUN 6 (L) 02/02/2022    CREATININE 0.8 02/02/2022    CREATININE 0.8 02/02/2022    CALCIUM 9.3 02/02/2022    CALCIUM 9.3 02/02/2022    ANIONGAP 13 02/02/2022    ANIONGAP 13 02/02/2022    ESTGFRAFRICA >60.0 02/02/2022    ESTGFRAFRICA >60.0 02/02/2022    EGFRNONAA >60.0 02/02/2022    EGFRNONAA >60.0 02/02/2022     Lab Results   Component Value Date    ALT 14 02/02/2022    AST 21 02/02/2022    GGT 91 (H) 07/28/2014    ALKPHOS 65 02/02/2022    BILITOT 0.3 02/02/2022     Lab Results   Component Value Date    TSH 2.265 03/30/2021     Lab Results   Component Value Date    HGBA1C 5.3 12/11/2018       IMPRESSION:    Bhavna Landry is a 66 y.o. female with history of Paranoid Schizophrenia, Depression, and Insomnia who presents for follow up appointment.    Status/Progress:  Based on the examination today, the patient's problem(s) is/are stable.  New problems have been presented today.    Risk Parameters:  Patient reports no suicidal ideation  Patient reports no homicidal ideation  Patient reports no self-injurious behavior  Patient reports no violent behavior      DIAGNOSES:    ICD-10-CM ICD-9-CM   1. Paranoid schizophrenia  F20.0 295.30   2. Recurrent major depressive disorder, in full remission   F33.42 296.36   3. Anxiety  F41.9 300.00   4. Mild cognitive impairment with memory loss  G31.84 331.83   5. Extrapyramidal symptom  R29.818 781.99       PLAN:  · Symptoms currently stable.  · Continue Risperdal 2mg/4mg, Effexor 75mg daily, Neurontin 300mg qHS, and Cogentin 0.5mg BID.  · Hyponatremia is being managed by Nephrology.  · Discussed with patient and daughter informed consent, risks versus benefits, alternative treatments, side effect profile and the inherent unpredictability of individual responses to these treatments.  The patient's daughter expresses understanding of the above and displays the capacity to agree with this current plan.    RETURN TO CLINIC:  Follow up in about 3 months (around 5/14/2022).

## 2022-02-15 ENCOUNTER — TELEPHONE (OUTPATIENT)
Dept: INFECTIOUS DISEASES | Facility: HOSPITAL | Age: 67
End: 2022-02-15
Payer: MEDICARE

## 2022-02-18 ENCOUNTER — TELEPHONE (OUTPATIENT)
Dept: INTERNAL MEDICINE | Facility: CLINIC | Age: 67
End: 2022-02-18
Payer: MEDICARE

## 2022-02-18 DIAGNOSIS — E78.5 HYPERLIPIDEMIA, UNSPECIFIED HYPERLIPIDEMIA TYPE: Primary | ICD-10-CM

## 2022-02-18 NOTE — TELEPHONE ENCOUNTER
----- Message from Marcia Reyes sent at 2/18/2022 11:59 AM CST -----  Contact: [phone] Patient  Doctor appointment and lab have been scheduled.  Please link lab orders to the lab appointment.  Date of doctor appointment:  05/30/22  Date of lab appointment:  05/23/22  Physical or F/U: physical  Comments:

## 2022-02-23 DIAGNOSIS — D84.9 IMMUNOSUPPRESSED STATUS: ICD-10-CM

## 2022-03-07 ENCOUNTER — SPECIALTY PHARMACY (OUTPATIENT)
Dept: PHARMACY | Facility: CLINIC | Age: 67
End: 2022-03-07

## 2022-03-07 DIAGNOSIS — M06.9 RHEUMATOID ARTHRITIS, INVOLVING UNSPECIFIED SITE, UNSPECIFIED WHETHER RHEUMATOID FACTOR PRESENT: ICD-10-CM

## 2022-03-07 DIAGNOSIS — L40.50 PSA (PSORIATIC ARTHRITIS): Primary | ICD-10-CM

## 2022-03-07 NOTE — TELEPHONE ENCOUNTER
Vicky, this is Que Almaguer with [facility].  We are working on your prescription that your doctor has sent us. We will be working with your insurance to get this approved for you. We will be calling you along the way with updates on your medication.  If you have any questions, you can reach us at [phone].    Welcome call outcome: Patient/caregiver reached     Obtained patient consent for [facility] to assist with PAP re-enrollment for Xeljanz.

## 2022-03-07 NOTE — TELEPHONE ENCOUNTER
Financial Assistance -Xeljanz 11 mg ER tab    -Spoke to [employer] rep, patient has not re-enrolled in PAP, which  on 2021.     -Forward to FA team to assist her with PAP re-enrollment

## 2022-03-07 NOTE — TELEPHONE ENCOUNTER
Benefits Investigation -Xeljanz 11 mg ER tab    Insurance name: [employer] Medicare Part D  Copay:$ [phone]  LIS LVL: none   Obtained patient consent for FA? Patient has been receiving med through [facility].    BI complete.

## 2022-03-07 NOTE — TELEPHONE ENCOUNTER
Drug/Indication: Xeljanz 11mg ER tabs for PsA     Dose appropriate: Take 11 mg by mouth once daily.  Contraindications: none   Drug Interactions: reviewed   Labs Reviewed: CMP, CBC, lipid panel, TB negative (2019), hepB negative (2020)     -Rx received. Medication appropriate. PA not required.    -Successful test claim

## 2022-03-15 ENCOUNTER — PATIENT OUTREACH (OUTPATIENT)
Dept: ADMINISTRATIVE | Facility: OTHER | Age: 67
End: 2022-03-15
Payer: MEDICARE

## 2022-03-15 ENCOUNTER — OFFICE VISIT (OUTPATIENT)
Dept: DERMATOLOGY | Facility: CLINIC | Age: 67
End: 2022-03-15
Payer: MEDICARE

## 2022-03-15 VITALS — BODY MASS INDEX: 20.51 KG/M2 | WEIGHT: 105 LBS

## 2022-03-15 DIAGNOSIS — L81.4 LENTIGINES: ICD-10-CM

## 2022-03-15 DIAGNOSIS — Z85.828 HISTORY OF SKIN CANCER: ICD-10-CM

## 2022-03-15 DIAGNOSIS — L57.0 MULTIPLE ACTINIC KERATOSES: Primary | ICD-10-CM

## 2022-03-15 PROCEDURE — 1126F AMNT PAIN NOTED NONE PRSNT: CPT | Mod: CPTII,S$GLB,, | Performed by: DERMATOLOGY

## 2022-03-15 PROCEDURE — 1159F MED LIST DOCD IN RCRD: CPT | Mod: CPTII,S$GLB,, | Performed by: DERMATOLOGY

## 2022-03-15 PROCEDURE — 3008F BODY MASS INDEX DOCD: CPT | Mod: CPTII,S$GLB,, | Performed by: DERMATOLOGY

## 2022-03-15 PROCEDURE — 1159F PR MEDICATION LIST DOCUMENTED IN MEDICAL RECORD: ICD-10-PCS | Mod: CPTII,S$GLB,, | Performed by: DERMATOLOGY

## 2022-03-15 PROCEDURE — 17003 DESTRUCT PREMALG LES 2-14: CPT | Mod: S$GLB,,, | Performed by: DERMATOLOGY

## 2022-03-15 PROCEDURE — 1126F PR PAIN SEVERITY QUANTIFIED, NO PAIN PRESENT: ICD-10-PCS | Mod: CPTII,S$GLB,, | Performed by: DERMATOLOGY

## 2022-03-15 PROCEDURE — 17000 PR DESTRUCTION(LASER SURGERY,CRYOSURGERY,CHEMOSURGERY),PREMALIGNANT LESIONS,FIRST LESION: ICD-10-PCS | Mod: S$GLB,,, | Performed by: DERMATOLOGY

## 2022-03-15 PROCEDURE — 17003 DESTRUCTION, PREMALIGNANT LESIONS; SECOND THROUGH 14 LESIONS: ICD-10-PCS | Mod: S$GLB,,, | Performed by: DERMATOLOGY

## 2022-03-15 PROCEDURE — 17000 DESTRUCT PREMALG LESION: CPT | Mod: S$GLB,,, | Performed by: DERMATOLOGY

## 2022-03-15 PROCEDURE — 99999 PR PBB SHADOW E&M-EST. PATIENT-LVL II: ICD-10-PCS | Mod: PBBFAC,,, | Performed by: DERMATOLOGY

## 2022-03-15 PROCEDURE — 99214 PR OFFICE/OUTPT VISIT, EST, LEVL IV, 30-39 MIN: ICD-10-PCS | Mod: 25,S$GLB,, | Performed by: DERMATOLOGY

## 2022-03-15 PROCEDURE — 1160F PR REVIEW ALL MEDS BY PRESCRIBER/CLIN PHARMACIST DOCUMENTED: ICD-10-PCS | Mod: CPTII,S$GLB,, | Performed by: DERMATOLOGY

## 2022-03-15 PROCEDURE — 99214 OFFICE O/P EST MOD 30 MIN: CPT | Mod: 25,S$GLB,, | Performed by: DERMATOLOGY

## 2022-03-15 PROCEDURE — 1160F RVW MEDS BY RX/DR IN RCRD: CPT | Mod: CPTII,S$GLB,, | Performed by: DERMATOLOGY

## 2022-03-15 PROCEDURE — 3008F PR BODY MASS INDEX (BMI) DOCUMENTED: ICD-10-PCS | Mod: CPTII,S$GLB,, | Performed by: DERMATOLOGY

## 2022-03-15 PROCEDURE — 99999 PR PBB SHADOW E&M-EST. PATIENT-LVL II: CPT | Mod: PBBFAC,,, | Performed by: DERMATOLOGY

## 2022-03-15 NOTE — PROGRESS NOTES
Care Everywhere: updated  Immunization:   Health Maintenance: updated  Media Review:review for outside colon cancer report    Legacy Review:   DIS:  Order placed:   Upcoming appts:mammogram 3.16   EFAX:  Task Tickets:  Referrals:

## 2022-03-15 NOTE — PROGRESS NOTES
Subjective:       Patient ID:  Bhavna Landry is a 66 y.o. female who presents for   Chief Complaint   Patient presents with    Follow-up     SCC-right arm     Spot      arm      Had the scc on her arm removed doing well, 2 new spots now.       Review of Systems   Constitutional: Negative for fever, chills, weight loss, weight gain, fatigue, night sweats and malaise.   Skin: Negative for daily sunscreen use, activity-related sunscreen use and wears hat.   Hematologic/Lymphatic: Bruises/bleeds easily.        Objective:    Physical Exam   Constitutional: She appears well-developed and well-nourished. No distress.   Neurological: She is alert and oriented to person, place, and time. She is not disoriented.   Psychiatric: She has a normal mood and affect.   Skin:   Areas Examined (abnormalities noted in diagram):   Head / Face Inspection Performed  Neck Inspection Performed  Chest / Axilla Inspection Performed  Back Inspection Performed  RUE Inspected  LUE Inspection Performed              Diagram Legend     Erythematous scaling macule/papule c/w actinic keratosis       Vascular papule c/w angioma      Pigmented verrucoid papule/plaque c/w seborrheic keratosis      Yellow umbilicated papule c/w sebaceous hyperplasia      Irregularly shaped tan macule c/w lentigo     1-2 mm smooth white papules consistent with Milia      Movable subcutaneous cyst with punctum c/w epidermal inclusion cyst      Subcutaneous movable cyst c/w pilar cyst      Firm pink to brown papule c/w dermatofibroma      Pedunculated fleshy papule(s) c/w skin tag(s)      Evenly pigmented macule c/w junctional nevus     Mildly variegated pigmented, slightly irregular-bordered macule c/w mildly atypical nevus      Flesh colored to evenly pigmented papule c/w intradermal nevus       Pink pearly papule/plaque c/w basal cell carcinoma      Erythematous hyperkeratotic cursted plaque c/w SCC      Surgical scar with no sign of skin cancer recurrence      Open  "and closed comedones      Inflammatory papules and pustules      Verrucoid papule consistent consistent with wart     Erythematous eczematous patches and plaques     Dystrophic onycholytic nail with subungual debris c/w onychomycosis     Umbilicated papule    Erythematous-base heme-crusted tan verrucoid plaque consistent with inflamed seborrheic keratosis     Erythematous Silvery Scaling Plaque c/w Psoriasis     See annotation      Assessment / Plan:        Multiple actinic keratoses   Cryosurgery Procedure Note    Verbal consent from the patient is obtained and the patient is aware of the precancerous quality and need for treatment of these lesions. Liquid nitrogen cryosurgery is applied to the 2 actinic keratoses, as detailed in the physical exam, to produce a freeze injury.      Lentigines  The "ABCD" rules to observe pigmented lesions were reviewed.      History of skin cancer  Comments:  scc right arm removed mohs surgery  Area(s) of previous NMSC evaluated with no signs of recurrence.     No lesions suspicious for malignancy noted.    Recommend daily sun protection/avoidance and use of at least SPF 30, broad spectrum sunscreen (OTC drug).                Follow up in about 6 months (around 9/15/2022).  "

## 2022-03-16 ENCOUNTER — HOSPITAL ENCOUNTER (OUTPATIENT)
Dept: RADIOLOGY | Facility: HOSPITAL | Age: 67
Discharge: HOME | End: 2022-03-16
Attending: INTERNAL MEDICINE
Payer: MEDICARE

## 2022-03-16 DIAGNOSIS — Z12.31 ENCOUNTER FOR SCREENING MAMMOGRAM FOR MALIGNANT NEOPLASM OF BREAST: ICD-10-CM

## 2022-03-16 PROCEDURE — 77067 SCR MAMMO BI INCL CAD: CPT | Mod: TC

## 2022-03-16 PROCEDURE — 77063 BREAST TOMOSYNTHESIS BI: CPT | Mod: 26,,, | Performed by: RADIOLOGY

## 2022-03-16 PROCEDURE — 77067 MAMMO DIGITAL SCREENING BILAT WITH TOMO: ICD-10-PCS | Mod: 26,,, | Performed by: RADIOLOGY

## 2022-03-16 PROCEDURE — 77063 BREAST TOMOSYNTHESIS BI: CPT | Mod: TC

## 2022-03-16 PROCEDURE — 77067 SCR MAMMO BI INCL CAD: CPT | Mod: 26,,, | Performed by: RADIOLOGY

## 2022-03-16 PROCEDURE — 77063 MAMMO DIGITAL SCREENING BILAT WITH TOMO: ICD-10-PCS | Mod: 26,,, | Performed by: RADIOLOGY

## 2022-03-17 ENCOUNTER — INFUSION (OUTPATIENT)
Dept: INFECTIOUS DISEASES | Facility: HOSPITAL | Age: 67
End: 2022-03-17
Attending: INTERNAL MEDICINE
Payer: MEDICARE

## 2022-03-17 VITALS
DIASTOLIC BLOOD PRESSURE: 65 MMHG | OXYGEN SATURATION: 98 % | SYSTOLIC BLOOD PRESSURE: 143 MMHG | WEIGHT: 107.25 LBS | BODY MASS INDEX: 21.06 KG/M2 | TEMPERATURE: 97 F | RESPIRATION RATE: 20 BRPM | HEART RATE: 68 BPM | HEIGHT: 60 IN

## 2022-03-17 DIAGNOSIS — M81.0 OSTEOPOROSIS, POST-MENOPAUSAL: Primary | ICD-10-CM

## 2022-03-17 PROCEDURE — 96365 THER/PROPH/DIAG IV INF INIT: CPT

## 2022-03-17 PROCEDURE — 63600175 PHARM REV CODE 636 W HCPCS: Performed by: INTERNAL MEDICINE

## 2022-03-17 RX ORDER — HEPARIN 100 UNIT/ML
500 SYRINGE INTRAVENOUS
Status: DISCONTINUED | OUTPATIENT
Start: 2022-03-17 | End: 2022-03-17 | Stop reason: HOSPADM

## 2022-03-17 RX ORDER — ZOLEDRONIC ACID 5 MG/100ML
5 INJECTION, SOLUTION INTRAVENOUS
Status: COMPLETED | OUTPATIENT
Start: 2022-03-17 | End: 2022-03-17

## 2022-03-17 RX ORDER — SODIUM CHLORIDE 0.9 % (FLUSH) 0.9 %
10 SYRINGE (ML) INJECTION
Status: CANCELLED | OUTPATIENT
Start: 2022-03-17

## 2022-03-17 RX ORDER — HEPARIN 100 UNIT/ML
500 SYRINGE INTRAVENOUS
Status: CANCELLED | OUTPATIENT
Start: 2022-03-17

## 2022-03-17 RX ORDER — ACETAMINOPHEN 325 MG/1
650 TABLET ORAL
Status: DISCONTINUED | OUTPATIENT
Start: 2022-03-17 | End: 2022-03-17 | Stop reason: HOSPADM

## 2022-03-17 RX ORDER — ACETAMINOPHEN 325 MG/1
650 TABLET ORAL
Status: CANCELLED | OUTPATIENT
Start: 2022-03-17

## 2022-03-17 RX ORDER — ZOLEDRONIC ACID 5 MG/100ML
5 INJECTION, SOLUTION INTRAVENOUS
Status: CANCELLED | OUTPATIENT
Start: 2022-03-17

## 2022-03-17 RX ORDER — SODIUM CHLORIDE 0.9 % (FLUSH) 0.9 %
10 SYRINGE (ML) INJECTION
Status: DISCONTINUED | OUTPATIENT
Start: 2022-03-17 | End: 2022-03-17 | Stop reason: HOSPADM

## 2022-03-17 RX ADMIN — ZOLEDRONIC ACID 5 MG: 5 INJECTION, SOLUTION INTRAVENOUS at 11:03

## 2022-03-17 NOTE — PROGRESS NOTES
"Pt received Reclast infusion over 20 min., Patient refused pre-medication stated, "will take Tylenol when I get home." .  Tolerated infusion with any adverse reactions noted well, left unit in NAD.      "

## 2022-03-18 ENCOUNTER — TELEPHONE (OUTPATIENT)
Dept: INTERNAL MEDICINE | Facility: CLINIC | Age: 67
End: 2022-03-18
Payer: MEDICARE

## 2022-03-18 DIAGNOSIS — Z86.010 HISTORY OF ADENOMATOUS POLYP OF COLON: Primary | ICD-10-CM

## 2022-03-18 NOTE — TELEPHONE ENCOUNTER
----- Message from Salena Lopes sent at 3/18/2022  9:27 AM CDT -----  Contact: Bhavna mohr @ [phone]  Patient want to have a colonoscopy done which is due in five yrs..

## 2022-03-22 ENCOUNTER — HOSPITAL ENCOUNTER (OUTPATIENT)
Dept: RADIOLOGY | Facility: HOSPITAL | Age: 67
Discharge: HOME | End: 2022-03-22
Attending: INTERNAL MEDICINE
Payer: MEDICARE

## 2022-03-22 DIAGNOSIS — R92.8 ABNORMAL MAMMOGRAM: ICD-10-CM

## 2022-03-22 PROCEDURE — 77061 BREAST TOMOSYNTHESIS UNI: CPT | Mod: 26,RT,, | Performed by: RADIOLOGY

## 2022-03-22 PROCEDURE — 77061 MAMMO DIGITAL DIAGNOSTIC RIGHT WITH TOMO: ICD-10-PCS | Mod: 26,RT,, | Performed by: RADIOLOGY

## 2022-03-22 PROCEDURE — 77065 MAMMO DIGITAL DIAGNOSTIC RIGHT WITH TOMO: ICD-10-PCS | Mod: 26,RT,, | Performed by: RADIOLOGY

## 2022-03-22 PROCEDURE — 77065 DX MAMMO INCL CAD UNI: CPT | Mod: TC,RT

## 2022-03-22 PROCEDURE — 76642 ULTRASOUND BREAST LIMITED: CPT | Mod: TC,RT

## 2022-03-22 PROCEDURE — 77065 DX MAMMO INCL CAD UNI: CPT | Mod: 26,RT,, | Performed by: RADIOLOGY

## 2022-03-22 PROCEDURE — 76642 ULTRASOUND BREAST LIMITED: CPT | Mod: 26,RT,, | Performed by: RADIOLOGY

## 2022-03-22 PROCEDURE — 76642 US BREAST RIGHT LIMITED: ICD-10-PCS | Mod: 26,RT,, | Performed by: RADIOLOGY

## 2022-03-29 ENCOUNTER — HOSPITAL ENCOUNTER (OUTPATIENT)
Dept: RADIOLOGY | Facility: HOSPITAL | Age: 67
Discharge: HOME | End: 2022-03-29
Attending: INTERNAL MEDICINE
Payer: MEDICARE

## 2022-03-29 DIAGNOSIS — R92.8 ABNORMAL MAMMOGRAM: ICD-10-CM

## 2022-03-29 PROCEDURE — 88360 TUMOR IMMUNOHISTOCHEM/MANUAL: CPT | Mod: 26,,, | Performed by: PATHOLOGY

## 2022-03-29 PROCEDURE — 77065 DX MAMMO INCL CAD UNI: CPT | Mod: TC,RT

## 2022-03-29 PROCEDURE — 88341 PR IHC OR ICC EACH ADD'L SINGLE ANTIBODY  STAINPR: ICD-10-PCS | Mod: 26,59,, | Performed by: PATHOLOGY

## 2022-03-29 PROCEDURE — 19083 BX BREAST 1ST LESION US IMAG: CPT | Mod: RT,,, | Performed by: RADIOLOGY

## 2022-03-29 PROCEDURE — 88342 IMHCHEM/IMCYTCHM 1ST ANTB: CPT | Performed by: PATHOLOGY

## 2022-03-29 PROCEDURE — 19083 US BREAST BIOPSY WITH IMAGING 1ST SITE RIGHT: ICD-10-PCS | Mod: RT,,, | Performed by: RADIOLOGY

## 2022-03-29 PROCEDURE — 88341 IMHCHEM/IMCYTCHM EA ADD ANTB: CPT | Performed by: PATHOLOGY

## 2022-03-29 PROCEDURE — 88305 TISSUE EXAM BY PATHOLOGIST: CPT | Mod: 26,,, | Performed by: PATHOLOGY

## 2022-03-29 PROCEDURE — 27200934 US BREAST BIOPSY WITH IMAGING 1ST SITE RIGHT

## 2022-03-29 PROCEDURE — 88305 TISSUE EXAM BY PATHOLOGIST: CPT | Performed by: PATHOLOGY

## 2022-03-29 PROCEDURE — 88305 TISSUE EXAM BY PATHOLOGIST: ICD-10-PCS | Mod: 26,,, | Performed by: PATHOLOGY

## 2022-03-29 PROCEDURE — 88342 CHG IMMUNOCYTOCHEMISTRY: ICD-10-PCS | Mod: 26,59,, | Performed by: PATHOLOGY

## 2022-03-29 PROCEDURE — 77065 DX MAMMO INCL CAD UNI: CPT | Mod: 26,RT,, | Performed by: RADIOLOGY

## 2022-03-29 PROCEDURE — 88341 IMHCHEM/IMCYTCHM EA ADD ANTB: CPT | Mod: 26,59,, | Performed by: PATHOLOGY

## 2022-03-29 PROCEDURE — 88342 IMHCHEM/IMCYTCHM 1ST ANTB: CPT | Mod: 26,59,, | Performed by: PATHOLOGY

## 2022-03-29 PROCEDURE — 88360 PR  TUMOR IMMUNOHISTOCHEM/MANUAL: ICD-10-PCS | Mod: 26,,, | Performed by: PATHOLOGY

## 2022-03-29 PROCEDURE — 77065 MAMMO DIGITAL DIAGNOSTIC RIGHT: ICD-10-PCS | Mod: 26,RT,, | Performed by: RADIOLOGY

## 2022-03-29 PROCEDURE — 27202044 US BREAST BIOPSY WITH IMAGING 1ST SITE RIGHT

## 2022-03-29 PROCEDURE — 88360 TUMOR IMMUNOHISTOCHEM/MANUAL: CPT | Performed by: PATHOLOGY

## 2022-03-30 ENCOUNTER — OFFICE VISIT (OUTPATIENT)
Dept: NEUROLOGY | Facility: CLINIC | Age: 67
End: 2022-03-30
Payer: MEDICARE

## 2022-03-30 VITALS
SYSTOLIC BLOOD PRESSURE: 119 MMHG | DIASTOLIC BLOOD PRESSURE: 65 MMHG | BODY MASS INDEX: 21.01 KG/M2 | HEART RATE: 77 BPM | WEIGHT: 107 LBS | HEIGHT: 60 IN

## 2022-03-30 DIAGNOSIS — H50.811 DUANE'S SYNDROME OF RIGHT EYE: Primary | ICD-10-CM

## 2022-03-30 DIAGNOSIS — F20.0 PARANOID SCHIZOPHRENIA: ICD-10-CM

## 2022-03-30 DIAGNOSIS — G31.84 MILD COGNITIVE IMPAIRMENT WITH MEMORY LOSS: ICD-10-CM

## 2022-03-30 PROCEDURE — 1125F PR PAIN SEVERITY QUANTIFIED, PAIN PRESENT: ICD-10-PCS | Mod: CPTII,S$GLB,, | Performed by: PSYCHIATRY & NEUROLOGY

## 2022-03-30 PROCEDURE — 3074F PR MOST RECENT SYSTOLIC BLOOD PRESSURE < 130 MM HG: ICD-10-PCS | Mod: CPTII,S$GLB,, | Performed by: PSYCHIATRY & NEUROLOGY

## 2022-03-30 PROCEDURE — 3078F PR MOST RECENT DIASTOLIC BLOOD PRESSURE < 80 MM HG: ICD-10-PCS | Mod: CPTII,S$GLB,, | Performed by: PSYCHIATRY & NEUROLOGY

## 2022-03-30 PROCEDURE — 4010F ACE/ARB THERAPY RXD/TAKEN: CPT | Mod: CPTII,S$GLB,, | Performed by: PSYCHIATRY & NEUROLOGY

## 2022-03-30 PROCEDURE — 1159F MED LIST DOCD IN RCRD: CPT | Mod: CPTII,S$GLB,, | Performed by: PSYCHIATRY & NEUROLOGY

## 2022-03-30 PROCEDURE — 99999 PR PBB SHADOW E&M-EST. PATIENT-LVL IV: CPT | Mod: PBBFAC,,, | Performed by: PSYCHIATRY & NEUROLOGY

## 2022-03-30 PROCEDURE — 99999 PR PBB SHADOW E&M-EST. PATIENT-LVL IV: ICD-10-PCS | Mod: PBBFAC,,, | Performed by: PSYCHIATRY & NEUROLOGY

## 2022-03-30 PROCEDURE — 3078F DIAST BP <80 MM HG: CPT | Mod: CPTII,S$GLB,, | Performed by: PSYCHIATRY & NEUROLOGY

## 2022-03-30 PROCEDURE — 3074F SYST BP LT 130 MM HG: CPT | Mod: CPTII,S$GLB,, | Performed by: PSYCHIATRY & NEUROLOGY

## 2022-03-30 PROCEDURE — 4010F PR ACE/ARB THEARPY RXD/TAKEN: ICD-10-PCS | Mod: CPTII,S$GLB,, | Performed by: PSYCHIATRY & NEUROLOGY

## 2022-03-30 PROCEDURE — 3008F BODY MASS INDEX DOCD: CPT | Mod: CPTII,S$GLB,, | Performed by: PSYCHIATRY & NEUROLOGY

## 2022-03-30 PROCEDURE — 1159F PR MEDICATION LIST DOCUMENTED IN MEDICAL RECORD: ICD-10-PCS | Mod: CPTII,S$GLB,, | Performed by: PSYCHIATRY & NEUROLOGY

## 2022-03-30 PROCEDURE — 1160F PR REVIEW ALL MEDS BY PRESCRIBER/CLIN PHARMACIST DOCUMENTED: ICD-10-PCS | Mod: CPTII,S$GLB,, | Performed by: PSYCHIATRY & NEUROLOGY

## 2022-03-30 PROCEDURE — 1160F RVW MEDS BY RX/DR IN RCRD: CPT | Mod: CPTII,S$GLB,, | Performed by: PSYCHIATRY & NEUROLOGY

## 2022-03-30 PROCEDURE — 99214 OFFICE O/P EST MOD 30 MIN: CPT | Mod: S$GLB,,, | Performed by: PSYCHIATRY & NEUROLOGY

## 2022-03-30 PROCEDURE — 99214 PR OFFICE/OUTPT VISIT, EST, LEVL IV, 30-39 MIN: ICD-10-PCS | Mod: S$GLB,,, | Performed by: PSYCHIATRY & NEUROLOGY

## 2022-03-30 PROCEDURE — 3008F PR BODY MASS INDEX (BMI) DOCUMENTED: ICD-10-PCS | Mod: CPTII,S$GLB,, | Performed by: PSYCHIATRY & NEUROLOGY

## 2022-03-30 PROCEDURE — 1125F AMNT PAIN NOTED PAIN PRSNT: CPT | Mod: CPTII,S$GLB,, | Performed by: PSYCHIATRY & NEUROLOGY

## 2022-03-30 NOTE — PROGRESS NOTES
[facility] NEUROLOGY  [facility], [place]    Date: 3/30/22  Patient Name: Bhavna Landry   MRN: [id number]   PCP: Sadaf Vargas  Referring Provider: No ref. provider found    Chief Complaint:  Follow-up for Changes in memory  Subjective:     03/30/22:  Patient presents today to discuss neuropsychology results.  Report outlines that no previous baseline has been established with the patient and that examiner.  For this reason, it was difficult for the examiner to establish whether the patient's current cognitive status is indicative of baseline/normal changes related to aging and schizophrenia history.  There were no findings to strongly suggest neuro degenerative process otherwise.  They recommended repeat testing in 1 year now that this testing could be used as baseline.  We discussed the results at length and I answered the patient's questions to her satisfaction.  We agreed to proceed conservatively with monitoring over time.  No new neurological complaints at this time.       ====================================  12/30/21 HPI:   Ms. Bhavna Landry is a 66 y.o. female presenting alongside her granddaughter to discuss changes in memory.  The patient notes that over the last couple of years she has seen a slow decline in her short-term memory.  She gives the example of progressive issues with remembering her grocery list and misplacing items about the home.  She is driving with no instances of accident or navigation complaints.  She handles all of her own activities of daily living including paying bills, managing her finances, bathing, dressing.  She admits to some family history of diagnosed memory disturbances but is unsure if it was Alzheimer's specifically.  Both of her brothers have been diagnosed with memory problems over the age of 65.     Denies any disturbances in sleep.  Lives with daughter, son-in-law, and 3 grandchildren including the 1 present today.  No falls or significant  changes to gait reported.  No head trauma in the last 5 years.    Remote history of migraine headaches in remission for at least the last 4 years.  Congenital cranial nerve 6 palsy/Duane syndrome.  No other significant neurological history including epilepsy.  Psychiatric history includes paranoid schizophrenia with use of antipsychotics.  ====================================     CURRENT MEDS:  Current Outpatient Medications   Medication Sig Dispense Refill    albuterol (PROAIR HFA) 90 mcg/actuation inhaler 2 puffs qid prn 54 g 3    amLODIPine (NORVASC) 10 MG tablet Take 1 tablet (10 mg total) by mouth once daily. 30 tablet 11    ascorbic acid, vitamin C, (VITAMIN C) 500 MG tablet Take 500 mg by mouth once daily.      aspirin (ECOTRIN) 81 MG EC tablet Take 1 tablet (81 mg total) by mouth once daily. 360 tablet 0    atorvastatin (LIPITOR) 80 MG tablet Take 1 tablet (80 mg total) by mouth once daily. 90 tablet 3    b complex vitamins tablet Take 1 tablet by mouth once daily.      benztropine (COGENTIN) 0.5 MG tablet Take 1 tablet (0.5 mg total) by mouth 2 (two) times daily. 180 tablet 3    clopidogreL (PLAVIX) 75 mg tablet Take 75 mg by mouth once daily.      cyanocobalamin 500 MCG tablet Take 500 mcg by mouth once daily.      diclofenac sodium (VOLTAREN) 1 % Gel Apply 2 g topically 4 (four) times daily as needed. 3 each 3    fluticasone propionate (FLONASE) 50 mcg/actuation nasal spray SHAKE LIQUID WELL AND USE 1 SPRAY IN EACH NOSTRIL EVERY DAY 48 g 0    folic acid (FOLVITE) 1 MG tablet Take 1 tablet (1 mg total) by mouth once daily. 90 tablet 3    gabapentin (NEURONTIN) 300 MG capsule Take 1 capsule (300 mg total) by mouth every evening. 90 capsule 3    isosorbide mononitrate (IMDUR) 30 MG 24 hr tablet TAKE 1 TABLET(30 MG) BY MOUTH EVERY DAY 30 tablet 11    lisinopriL (PRINIVIL,ZESTRIL) 5 MG tablet Take 2 tablets (10 mg total) by mouth once daily. 180 tablet 0    methotrexate 2.5 MG Tab Take 4  tablets (10 mg total) by mouth every 7 days. 48 tablet 0    multivitamin (THERAGRAN) per tablet Take 1 tablet by mouth once daily.      risperiDONE (RISPERDAL) 2 MG tablet Take 1 tablet (2 mg total) by mouth every morning. 90 tablet 3    risperiDONE (RISPERDAL) 4 MG tablet TAKE 1 TABLET(4 MG) BY MOUTH EVERY EVENING 90 tablet 3    tofacitinib (XELJANZ XR) 11 mg Tb24 Take 11 mg by mouth once daily. 30 tablet 2    traZODone (DESYREL) 50 MG tablet Take 1 tablet (50 mg total) by mouth nightly as needed for Insomnia. 90 tablet 3    venlafaxine (EFFEXOR-XR) 75 MG 24 hr capsule TAKE 1 CAPSULE(75 MG) BY MOUTH EVERY DAY 30 capsule 11    vitamin D (VITAMIN D3) 1000 units Tab Take 1,000 Units by mouth once daily.      vitamin E 200 UNIT capsule Take 200 Units by mouth once daily.      zinc gluconate 50 mg tablet Take 50 mg by mouth once daily.      azelastine (ASTELIN) 137 mcg (0.1 %) nasal spray 1 spray (137 mcg total) by Nasal route 2 (two) times daily. 30 mL 0     No current facility-administered medications for this visit.     Facility-Administered Medications Ordered in Other Visits   Medication Dose Route Frequency Provider Last Rate Last Admin    0.9%  NaCl infusion  500 mL Intravenous Continuous Michelle Pineda Jr., MD        0.9%  NaCl infusion  500 mL Intravenous Continuous Michelle Pineda Jr., MD        lidocaine (PF) 10 mg/ml (1%) injection 10 mg  1 mL Intradermal Once Michelle Pineda Jr., MD        phenylephrine HCL 2.5% ophthalmic solution 1 drop  1 drop Right Eye On Call Procedure Karen Song MD   1 drop at 08/01/19 0648    proparacaine 0.5 % ophthalmic solution 1 drop  1 drop Right Eye On Call Procedure Karen Song MD   1 drop at 08/01/19 0638    tropicamide 1% ophthalmic solution 1 drop  1 drop Right Eye On Call Procedure Karen Song MD   1 drop at 08/01/19 0648       ALLERGIES:  Review of patient's allergies indicates:   Allergen Reactions    Etanercept Other (See  Comments)     Other reaction(s): recurrent infections    Chloramphenicol sod succinate Hives and Other (See Comments)    Codeine Other (See Comments)     Other reaction(s): Stomach upset    Nickel sutures [surgical stainless steel] Dermatitis     Allergic contact dermatitis    Adhesive Rash     Other reaction(s): Rash        Objective:     Vitals:    03/30/22 1422   BP: 119/65   Pulse: 77   Weight: 48.5 kg (107 lb)   Height: 5' (1.524 m)     General:  Female in NAD, alert and awake, Aox3, well groomed. ?    ? ?   Neurological Examination.    Mental status: AA&O x3; Affect/mood is euthymic/congruent; no aphasia      Cranial Nerves:  Right cranial nerve 6 palsy consistent with history, otherwise II-XII grossly intact.       Muscle Function: Tone WNL and Muscle bulk WNL.  Symmetric use of bilateral upper and lower extremities against gravity     Gait: adequate casual gait with stride length and arm swing WNL.     Other:  12/30/2021 MOCA: 23/30  Visuospatial/Executive 4, Naming 3, Attention 4, Language 2, Abstraction 1, Delayed Recall 3, Orientation 6    Assessment:   Bhavna Landry is a 66 y.o. female presenting to discuss changes in memory.  Recent neuro psychological testing did not find strong evidence of underlying neuro degenerative process but noted that changes associated with history of schizophrenia are difficult to assess based on no previous known baseline with that examiner.  For this reason, the patient will undergo repeat testing in approximately 1 year.  We will proceed conservatively with monitoring over time.    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  We also discussed the need for continued cognitively stimulating activities including regular socialization, reading, puzzles.    Return to clinic in late 2022 for repeat evaluation or sooner as needed    I spent a total of 30 minutes on the day of the visit. This includes face to face time and non-face to face time preparing to see the patient (eg, review of tests), obtaining and/or reviewing separately obtained history, documenting clinical information in the electronic or other health record, independently interpreting results and communicating results to the patient/family/caregiver, or care coordinator.    A dictation device was used to produce this document. Use of such devices sometimes results in grammatical errors or replacement of words that sound similarly.    Jose Bai, DO

## 2022-03-31 NOTE — TELEPHONE ENCOUNTER
Patient accepted PAP     Emailed copy to patient  Faxed [facility]O provider portion and sent staff msg informing of its arrival    Follow up on 4/7

## 2022-03-31 NOTE — TELEPHONE ENCOUNTER
Outgoing call - no answer/left msg    Calling patient to inquire if she is reenrolled in [facility] Patient assistance. If not,  we can assist her in re-enrolling

## 2022-04-01 ENCOUNTER — IMMUNIZATION (OUTPATIENT)
Dept: PHARMACY | Facility: CLINIC | Age: 67
End: 2022-04-01
Payer: MEDICARE

## 2022-04-01 ENCOUNTER — NURSE TRIAGE (OUTPATIENT)
Dept: ADMINISTRATIVE | Facility: CLINIC | Age: 67
End: 2022-04-01
Payer: MEDICARE

## 2022-04-01 ENCOUNTER — PES CALL (OUTPATIENT)
Dept: ADMINISTRATIVE | Facility: CLINIC | Age: 67
End: 2022-04-01
Payer: MEDICARE

## 2022-04-01 DIAGNOSIS — Z23 NEED FOR VACCINATION: Primary | ICD-10-CM

## 2022-04-01 NOTE — TELEPHONE ENCOUNTER
Bhavna calling and states that she had a biopsy on a mass of her Right breast on 3/29/22. States had a near fall but her breast hit the door frame full impact. She expresses concern that the clamp applied to the mass could have been abundio loose or even came off. States there is bruising but no raised area of swelling. Denies any bleeding or drainage from incision. PRS is a 5/10 and moderately relieved with 2 tylenol 500mg at 8pm.High importance question sent to PCP and RT.Protocol reviewed and care advice given. Pt agrees with advice and verbalizes understanding. Instructed to call for questions, concerns or changes.  Reason for Disposition   [1] Caller has NON-URGENT question AND [2] triager unable to answer question    Additional Information   Negative: [1] Major abdominal surgical incision AND [2] wound gaping open AND [3] visible internal organs   Negative: Sounds like a life-threatening emergency to the triager   Negative: [1] Bleeding from incision AND [2] won't stop after 10 minutes of direct pressure   Negative: [1] Widespread rash AND [2] bright red, sunburn-like   Negative: Severe pain in the incision   Negative: [1] Incision gaping open AND [2] < 48 hours since wound re-opened   Negative: [1] Incision gaping open AND [2] length of opening > 2 inches (5 cm)   Negative: Patient sounds very sick or weak to the triager   Negative: Sounds like a serious complication to the triager   Negative: Fever > 100.4 F (38.0 C)   Negative: [1] Incision looks infected (spreading redness, pain) AND [2] fever > 99.5 F (37.5 C)   Negative: [1] Incision looks infected (spreading redness, pain) AND [2] large red area (> 2 in. or 5 cm)   Negative: [1] Incision looks infected (spreading redness, pain) AND [2] face wound   Negative: [1] Red streak runs from the incision AND [2] longer than 1 inch (2.5 cm)   Negative: [1] Pus or bad-smelling fluid draining from incision AND [2] no fever   Negative: [1] Post-op  pain AND [2] not controlled with pain medications   Negative: Dressing soaked with blood or body fluid (e.g., drainage)   Negative: [1] Raised bruise and [2] size > 2 inches (5 cm) and expanding   Negative: [1] Caller has URGENT question AND [2] triager unable to answer question   Negative: [1] Suture or staple came out early AND [2] caller wants wound checked   Negative: [1] INCREASING pain in incision AND [2] > 2 days (48 hours) since surgery   Negative: [1] Small red area or streak AND [2] no fever   Negative: [1] Clear or blood-tinged fluid draining from wound AND [2] no fever   Negative: [1] Incision gaping open AND [2] > 48 hours since wound re-opened AND [3] length of opening > 1/2 inch (12 mm)   Negative: [1] Incision on face gaping open after skin glue AND [2] > 48 hours since wound re-opened AND [3] length of opening > 1/4 inch (6 mm)   Negative: Suture or staple removal is overdue    Protocols used: POST-OP INCISION SYMPTOMS AND [id number]-S-UY

## 2022-04-04 ENCOUNTER — OFFICE VISIT (OUTPATIENT)
Dept: HOME HEALTH SERVICES | Facility: CLINIC | Age: 67
End: 2022-04-04
Payer: MEDICARE

## 2022-04-04 ENCOUNTER — TELEPHONE (OUTPATIENT)
Dept: ADMINISTRATIVE | Facility: CLINIC | Age: 67
End: 2022-04-04
Payer: MEDICARE

## 2022-04-04 VITALS — HEIGHT: 60 IN | WEIGHT: 106 LBS | BODY MASS INDEX: 20.81 KG/M2

## 2022-04-04 DIAGNOSIS — Z00.00 ENCOUNTER FOR PREVENTIVE HEALTH EXAMINATION: Primary | ICD-10-CM

## 2022-04-04 DIAGNOSIS — M81.0 OSTEOPOROSIS, POST-MENOPAUSAL: ICD-10-CM

## 2022-04-04 DIAGNOSIS — E78.5 HYPERLIPIDEMIA, UNSPECIFIED HYPERLIPIDEMIA TYPE: ICD-10-CM

## 2022-04-04 DIAGNOSIS — I70.0 ATHEROSCLEROSIS OF AORTA: ICD-10-CM

## 2022-04-04 DIAGNOSIS — F20.0 PARANOID SCHIZOPHRENIA: ICD-10-CM

## 2022-04-04 DIAGNOSIS — F33.42 RECURRENT MAJOR DEPRESSIVE DISORDER, IN FULL REMISSION: ICD-10-CM

## 2022-04-04 DIAGNOSIS — G47.00 INSOMNIA, UNSPECIFIED TYPE: ICD-10-CM

## 2022-04-04 DIAGNOSIS — L40.50 PSA (PSORIATIC ARTHRITIS): ICD-10-CM

## 2022-04-04 DIAGNOSIS — F41.9 ANXIETY: ICD-10-CM

## 2022-04-04 DIAGNOSIS — M79.7 FIBROMYALGIA: ICD-10-CM

## 2022-04-04 DIAGNOSIS — G89.4 CHRONIC PAIN SYNDROME: ICD-10-CM

## 2022-04-04 DIAGNOSIS — J41.1 MUCOPURULENT CHRONIC BRONCHITIS: ICD-10-CM

## 2022-04-04 DIAGNOSIS — I10 ESSENTIAL HYPERTENSION: Chronic | ICD-10-CM

## 2022-04-04 DIAGNOSIS — I48.0 PAF (PAROXYSMAL ATRIAL FIBRILLATION): ICD-10-CM

## 2022-04-04 DIAGNOSIS — I77.9 BILATERAL CAROTID ARTERY DISEASE, UNSPECIFIED TYPE: ICD-10-CM

## 2022-04-04 DIAGNOSIS — G25.81 RESTLESS LEGS: ICD-10-CM

## 2022-04-04 DIAGNOSIS — M06.9 RHEUMATOID ARTHRITIS, INVOLVING UNSPECIFIED SITE, UNSPECIFIED WHETHER RHEUMATOID FACTOR PRESENT: ICD-10-CM

## 2022-04-04 PROCEDURE — 3288F FALL RISK ASSESSMENT DOCD: CPT | Mod: CPTII,95,, | Performed by: NURSE PRACTITIONER

## 2022-04-04 PROCEDURE — G0439 PPPS, SUBSEQ VISIT: HCPCS | Mod: 95,,, | Performed by: NURSE PRACTITIONER

## 2022-04-04 PROCEDURE — 3008F PR BODY MASS INDEX (BMI) DOCUMENTED: ICD-10-PCS | Mod: CPTII,95,, | Performed by: NURSE PRACTITIONER

## 2022-04-04 PROCEDURE — G0439 PR MEDICARE ANNUAL WELLNESS SUBSEQUENT VISIT: ICD-10-PCS | Mod: 95,,, | Performed by: NURSE PRACTITIONER

## 2022-04-04 PROCEDURE — 4010F ACE/ARB THERAPY RXD/TAKEN: CPT | Mod: CPTII,95,, | Performed by: NURSE PRACTITIONER

## 2022-04-04 PROCEDURE — 1159F MED LIST DOCD IN RCRD: CPT | Mod: CPTII,95,, | Performed by: NURSE PRACTITIONER

## 2022-04-04 PROCEDURE — 1126F PR PAIN SEVERITY QUANTIFIED, NO PAIN PRESENT: ICD-10-PCS | Mod: CPTII,95,, | Performed by: NURSE PRACTITIONER

## 2022-04-04 PROCEDURE — 3288F PR FALLS RISK ASSESSMENT DOCUMENTED: ICD-10-PCS | Mod: CPTII,95,, | Performed by: NURSE PRACTITIONER

## 2022-04-04 PROCEDURE — 1160F RVW MEDS BY RX/DR IN RCRD: CPT | Mod: CPTII,95,, | Performed by: NURSE PRACTITIONER

## 2022-04-04 PROCEDURE — 1101F PR PT FALLS ASSESS DOC 0-1 FALLS W/OUT INJ PAST YR: ICD-10-PCS | Mod: CPTII,95,, | Performed by: NURSE PRACTITIONER

## 2022-04-04 PROCEDURE — 3008F BODY MASS INDEX DOCD: CPT | Mod: CPTII,95,, | Performed by: NURSE PRACTITIONER

## 2022-04-04 PROCEDURE — 1160F PR REVIEW ALL MEDS BY PRESCRIBER/CLIN PHARMACIST DOCUMENTED: ICD-10-PCS | Mod: CPTII,95,, | Performed by: NURSE PRACTITIONER

## 2022-04-04 PROCEDURE — 1159F PR MEDICATION LIST DOCUMENTED IN MEDICAL RECORD: ICD-10-PCS | Mod: CPTII,95,, | Performed by: NURSE PRACTITIONER

## 2022-04-04 PROCEDURE — 99499 RISK ADDL DX/OHS AUDIT: ICD-10-PCS | Mod: 95,,, | Performed by: NURSE PRACTITIONER

## 2022-04-04 PROCEDURE — 1101F PT FALLS ASSESS-DOCD LE1/YR: CPT | Mod: CPTII,95,, | Performed by: NURSE PRACTITIONER

## 2022-04-04 PROCEDURE — 4010F PR ACE/ARB THEARPY RXD/TAKEN: ICD-10-PCS | Mod: CPTII,95,, | Performed by: NURSE PRACTITIONER

## 2022-04-04 PROCEDURE — 1126F AMNT PAIN NOTED NONE PRSNT: CPT | Mod: CPTII,95,, | Performed by: NURSE PRACTITIONER

## 2022-04-04 PROCEDURE — 99499 UNLISTED E&M SERVICE: CPT | Mod: 95,,, | Performed by: NURSE PRACTITIONER

## 2022-04-04 NOTE — PATIENT INSTRUCTIONS
Counseling and Referral of Other Preventative  (Italic type indicates deductible and co-insurance are waived)    Patient Name: Bhavna Landry  Today's Date: 4/4/2022    Health Maintenance       Date Due Completion Date    Colorectal Cancer Screening 05/30/2019 5/30/2016    Mammogram 03/29/2023 3/29/2022    DEXA Scan 09/10/2023 9/10/2020    Lipid Panel 02/12/2026 2/12/2021    TETANUS VACCINE 07/17/2027 7/17/2017        No orders of the defined types were placed in this encounter.    The following information is provided to all patients.  This information is to help you find resources for any of the problems found today that may be affecting your health:                Living healthy guide: www.[facility].[place].gov      Understanding Diabetes: www.diabetes.org      Eating healthy: www.cdc.gov/healthyweight      [facility] home safety checklist: www.cdc.gov/steadi/patient.html      Agency on Aging: www.goea.[place].[facility]      Alcoholics anonymous (AA): www.aa.org      Physical Activity: www.soni.nih.gov/dn5bavw      Tobacco use: www.quitwithusla.org

## 2022-04-04 NOTE — TELEPHONE ENCOUNTER
Provider portion corrected and received by [facility].    Sent completed application to [employer]    Will follow up 4/4/22

## 2022-04-04 NOTE — PROGRESS NOTES
The patient location is: [place]  The chief complaint leading to consultation is: Health Risk Assessment    Visit type: audiovisual    Face to Face time with patient: 20  40 minutes of total time spent on the encounter, which includes face to face time and non-face to face time preparing to see the patient (eg, review of tests), Obtaining and/or reviewing separately obtained history, Documenting clinical information in the electronic or other health record, Independently interpreting results (not separately reported) and communicating results to the patient/family/caregiver, or Care coordination (not separately reported).         Each patient to whom he or she provides medical services by telemedicine is:  (1) informed of the relationship between the physician and patient and the respective role of any other health care provider with respect to management of the patient; and (2) notified that he or she may decline to receive medical services by telemedicine and may withdraw from such care at any time.    Notes:       Bhavna Landry presented for a  Medicare AWV and comprehensive Health Risk Assessment today. The following components were reviewed and updated:    · Medical history  · Family History  · Social history  · Allergies and Current Medications  · Health Risk Assessment  · Health Maintenance  · Care Team         ** See Completed Assessments for Annual Wellness Visit within the encounter summary.**         The following assessments were completed:  · Living Situation  · CAGE  · Depression Screening  · Fall Risk Assessment (MACH 10)  · Hearing Assessment(HHI)  · Cognitive Function Screening  · Nutrition Screening  · ADL Screening  · PAQ Screening      Vitals:    04/04/22 0851   Weight: 48.1 kg (106 lb)   Height: 5' (1.524 m)     Body mass index is 20.7 kg/m².  Physical Exam  Constitutional:       Appearance: Normal appearance.   Neurological:      Mental Status: She is alert and oriented to person, place, and  time.               Diagnoses and health risks identified today and associated recommendations/orders:    1. Encounter for preventive health examination  Assessments completed. Preventive measures and health maintenance reviewed with patient.    2. Paranoid schizophrenia  Stable, followed by Psychiatry.    3. Recurrent major depressive disorder, in full remission  Stable, followed by PCP.    4. PAF (paroxysmal atrial fibrillation)  Stable, followed by Cardiology.    5. PSA (psoriatic arthritis)  Stable, followed by Rheumatology.    6. Bilateral carotid artery disease, unspecified type  Stable, followed by Cardiology.    7. Mucopurulent chronic bronchitis  Stable, followed by PCP.    8. Rheumatoid arthritis, involving unspecified site, unspecified whether rheumatoid factor present  Stable, followed by Rheumatology.    9. Atherosclerosis of aorta  Stable, followed by PCP.    10. Anxiety  Stable, followed by PCP.    11. Essential hypertension  Stable, followed by PCP.    12. Hyperlipidemia, unspecified hyperlipidemia type  Stable, followed by PCP.    13. Fibromyalgia  Stable, followed by Rheumatology.    14. Osteoporosis, post-menopausal  Stable, followed by PCP.    15. Chronic pain syndrome  Stable, followed by PCP and Rheumatology.    16. Restless legs  Stable, followed by PCP.    17. Insomnia, unspecified type  Stable, followed by PCP.    Provided Bhavna with a 5-10 year written screening schedule and personal prevention plan. Recommendations were developed using the USPSTF age appropriate recommendations. Education, counseling, and referrals were provided as needed. After Visit Summary printed and given to patient which includes a list of additional screenings\tests needed.    Follow up in about 1 year (around 4/4/2023) for your next annual wellness visit.    Manuela Rae NP  I offered to discuss advanced care planning, including how to pick a person who would make decisions for you if you were unable to make  them for yourself, called a health care power of , and what kind of decisions you might make such as use of life sustaining treatments such as ventilators and tube feeding when faced with a life limiting illness recorded on a living will that they will need to know. (How you want to be cared for as you near the end of your natural life)     X Patient is interested in learning more about how to make advanced directives.  I provided them paperwork and offered to discuss this with them.

## 2022-04-05 ENCOUNTER — TELEPHONE (OUTPATIENT)
Dept: SURGERY | Facility: CLINIC | Age: 67
End: 2022-04-05
Payer: MEDICARE

## 2022-04-05 ENCOUNTER — TELEPHONE (OUTPATIENT)
Dept: RHEUMATOLOGY | Facility: CLINIC | Age: 67
End: 2022-04-05
Payer: MEDICARE

## 2022-04-05 ENCOUNTER — TELEPHONE (OUTPATIENT)
Dept: INTERNAL MEDICINE | Facility: CLINIC | Age: 67
End: 2022-04-05
Payer: MEDICARE

## 2022-04-05 PROBLEM — I70.0 ATHEROSCLEROSIS OF AORTA: Status: ACTIVE | Noted: 2022-04-05

## 2022-04-05 NOTE — NURSING
Oncology Navigation   Intake  Date of Diagnosis: 3/29/2022  Cancer Type: Breast  Internal / External Referral: Internal  Referral Source: [facility]  Date of Referral: 4/5/2022  Initial Nurse Navigator Contact: 4/5/2022  Referral to Initial Contact Timeline (days): 0  Date Worked: 4/5/2022  First Appointment Available: 4/11/2022  Appointment Date: 4/13/2022  First Available Date vs. Scheduled Date (days): 2     Treatment  Current Status: Staging work-up    Surgical Oncologist: Dr.Alexa Dhillon  Consult Date: 4/13/2022          Procedures: Biopsy  Biopsy Schedule Date: 3/29/2022       ER: Positive  OR: Negative  Her2: Negative           Acuity      Follow Up  No follow-ups on file.

## 2022-04-05 NOTE — TELEPHONE ENCOUNTER
Called & spoke with pt, scheduled to see Dr. Kasey Dhillon on 4-13-22. Reviewed location & confirmed.

## 2022-04-05 NOTE — TELEPHONE ENCOUNTER
----- Message from Roxanna Andrade, RT sent at 4/5/2022  1:36 PM CDT -----  Regarding: positive breast bx  Good afternoon Ladies,    Ms. Pierre needs to be set up with a surgical consult. Her ultrasound breast biopsy results came back positive.  I've have spoken with her and she will be expecting your call.    Thanks,  Roxanna

## 2022-04-05 NOTE — TELEPHONE ENCOUNTER
----- Message from Chika Gonzalez sent at 4/5/2022  9:56 AM CDT -----  Contact: Self [phone]  Patient would like to get medical advice.    Would you like a call back, or a response through your [facility] portal?:   call back      Comments:   Calling to speak with the nurse regarding biopsy results.

## 2022-04-06 ENCOUNTER — TELEPHONE (OUTPATIENT)
Dept: RHEUMATOLOGY | Facility: CLINIC | Age: 67
End: 2022-04-06
Payer: MEDICARE

## 2022-04-06 ENCOUNTER — PATIENT MESSAGE (OUTPATIENT)
Dept: RHEUMATOLOGY | Facility: CLINIC | Age: 67
End: 2022-04-06
Payer: MEDICARE

## 2022-04-06 LAB
FINAL PATHOLOGIC DIAGNOSIS: NORMAL
GROSS: NORMAL
Lab: NORMAL
MICROSCOPIC EXAM: NORMAL
SUPPLEMENTAL DIAGNOSIS: NORMAL

## 2022-04-06 RX ORDER — GUSELKUMAB 100 MG/ML
INJECTION SUBCUTANEOUS
Qty: 3 ML | Refills: 1 | OUTPATIENT
Start: 2022-04-06 | End: 2022-04-25 | Stop reason: CLARIF

## 2022-04-07 ENCOUNTER — TELEPHONE (OUTPATIENT)
Dept: RHEUMATOLOGY | Facility: CLINIC | Age: 67
End: 2022-04-07
Payer: MEDICARE

## 2022-04-07 ENCOUNTER — OFFICE VISIT (OUTPATIENT)
Dept: INTERNAL MEDICINE | Facility: CLINIC | Age: 67
End: 2022-04-07
Payer: MEDICARE

## 2022-04-07 VITALS
DIASTOLIC BLOOD PRESSURE: 58 MMHG | SYSTOLIC BLOOD PRESSURE: 112 MMHG | TEMPERATURE: 97 F | HEIGHT: 62 IN | BODY MASS INDEX: 18.95 KG/M2 | HEART RATE: 76 BPM | OXYGEN SATURATION: 96 % | WEIGHT: 103 LBS

## 2022-04-07 DIAGNOSIS — E78.5 HYPERLIPIDEMIA, UNSPECIFIED HYPERLIPIDEMIA TYPE: Primary | ICD-10-CM

## 2022-04-07 DIAGNOSIS — J32.9 BACTERIAL SINUSITIS: ICD-10-CM

## 2022-04-07 DIAGNOSIS — L40.50 PSORIATIC ARTHRITIS: ICD-10-CM

## 2022-04-07 DIAGNOSIS — J40 BRONCHITIS: ICD-10-CM

## 2022-04-07 DIAGNOSIS — B96.89 BACTERIAL SINUSITIS: ICD-10-CM

## 2022-04-07 LAB
CTP QC/QA: YES
SARS-COV-2 RDRP RESP QL NAA+PROBE: NEGATIVE

## 2022-04-07 PROCEDURE — 4010F PR ACE/ARB THEARPY RXD/TAKEN: ICD-10-PCS | Mod: CPTII,S$GLB,, | Performed by: INTERNAL MEDICINE

## 2022-04-07 PROCEDURE — 99213 OFFICE O/P EST LOW 20 MIN: CPT | Mod: S$GLB,,, | Performed by: INTERNAL MEDICINE

## 2022-04-07 PROCEDURE — 1160F RVW MEDS BY RX/DR IN RCRD: CPT | Mod: CPTII,S$GLB,, | Performed by: INTERNAL MEDICINE

## 2022-04-07 PROCEDURE — 3288F PR FALLS RISK ASSESSMENT DOCUMENTED: ICD-10-PCS | Mod: CPTII,S$GLB,, | Performed by: INTERNAL MEDICINE

## 2022-04-07 PROCEDURE — 3078F PR MOST RECENT DIASTOLIC BLOOD PRESSURE < 80 MM HG: ICD-10-PCS | Mod: CPTII,S$GLB,, | Performed by: INTERNAL MEDICINE

## 2022-04-07 PROCEDURE — 3288F FALL RISK ASSESSMENT DOCD: CPT | Mod: CPTII,S$GLB,, | Performed by: INTERNAL MEDICINE

## 2022-04-07 PROCEDURE — 99999 PR PBB SHADOW E&M-EST. PATIENT-LVL IV: CPT | Mod: PBBFAC,,, | Performed by: INTERNAL MEDICINE

## 2022-04-07 PROCEDURE — 1160F PR REVIEW ALL MEDS BY PRESCRIBER/CLIN PHARMACIST DOCUMENTED: ICD-10-PCS | Mod: CPTII,S$GLB,, | Performed by: INTERNAL MEDICINE

## 2022-04-07 PROCEDURE — 1126F AMNT PAIN NOTED NONE PRSNT: CPT | Mod: CPTII,S$GLB,, | Performed by: INTERNAL MEDICINE

## 2022-04-07 PROCEDURE — 3008F PR BODY MASS INDEX (BMI) DOCUMENTED: ICD-10-PCS | Mod: CPTII,S$GLB,, | Performed by: INTERNAL MEDICINE

## 2022-04-07 PROCEDURE — 4010F ACE/ARB THERAPY RXD/TAKEN: CPT | Mod: CPTII,S$GLB,, | Performed by: INTERNAL MEDICINE

## 2022-04-07 PROCEDURE — U0002 COVID-19 LAB TEST NON-CDC: HCPCS | Mod: QW,S$GLB,, | Performed by: INTERNAL MEDICINE

## 2022-04-07 PROCEDURE — 99213 PR OFFICE/OUTPT VISIT, EST, LEVL III, 20-29 MIN: ICD-10-PCS | Mod: S$GLB,,, | Performed by: INTERNAL MEDICINE

## 2022-04-07 PROCEDURE — U0002: ICD-10-PCS | Mod: QW,S$GLB,, | Performed by: INTERNAL MEDICINE

## 2022-04-07 PROCEDURE — 1159F MED LIST DOCD IN RCRD: CPT | Mod: CPTII,S$GLB,, | Performed by: INTERNAL MEDICINE

## 2022-04-07 PROCEDURE — 3008F BODY MASS INDEX DOCD: CPT | Mod: CPTII,S$GLB,, | Performed by: INTERNAL MEDICINE

## 2022-04-07 PROCEDURE — 3074F PR MOST RECENT SYSTOLIC BLOOD PRESSURE < 130 MM HG: ICD-10-PCS | Mod: CPTII,S$GLB,, | Performed by: INTERNAL MEDICINE

## 2022-04-07 PROCEDURE — 1101F PT FALLS ASSESS-DOCD LE1/YR: CPT | Mod: CPTII,S$GLB,, | Performed by: INTERNAL MEDICINE

## 2022-04-07 PROCEDURE — 3074F SYST BP LT 130 MM HG: CPT | Mod: CPTII,S$GLB,, | Performed by: INTERNAL MEDICINE

## 2022-04-07 PROCEDURE — 1159F PR MEDICATION LIST DOCUMENTED IN MEDICAL RECORD: ICD-10-PCS | Mod: CPTII,S$GLB,, | Performed by: INTERNAL MEDICINE

## 2022-04-07 PROCEDURE — 1101F PR PT FALLS ASSESS DOC 0-1 FALLS W/OUT INJ PAST YR: ICD-10-PCS | Mod: CPTII,S$GLB,, | Performed by: INTERNAL MEDICINE

## 2022-04-07 PROCEDURE — 1126F PR PAIN SEVERITY QUANTIFIED, NO PAIN PRESENT: ICD-10-PCS | Mod: CPTII,S$GLB,, | Performed by: INTERNAL MEDICINE

## 2022-04-07 PROCEDURE — 99999 PR PBB SHADOW E&M-EST. PATIENT-LVL IV: ICD-10-PCS | Mod: PBBFAC,,, | Performed by: INTERNAL MEDICINE

## 2022-04-07 PROCEDURE — 3078F DIAST BP <80 MM HG: CPT | Mod: CPTII,S$GLB,, | Performed by: INTERNAL MEDICINE

## 2022-04-07 RX ORDER — AMOXICILLIN AND CLAVULANATE POTASSIUM 875; 125 MG/1; MG/1
1 TABLET, FILM COATED ORAL 2 TIMES DAILY
Qty: 20 TABLET | Refills: 0 | Status: SHIPPED | OUTPATIENT
Start: 2022-04-07 | End: 2022-04-17

## 2022-04-07 RX ORDER — METHOTREXATE 2.5 MG/1
10 TABLET ORAL
Qty: 16 TABLET | Refills: 0 | Status: SHIPPED | OUTPATIENT
Start: 2022-04-07 | End: 2022-04-11 | Stop reason: SDUPTHER

## 2022-04-07 NOTE — PROGRESS NOTES
Subjective:       Patient ID: Bhavna Landry is a 66 y.o. female.    Chief Complaint: Sore Throat, Otalgia, Cough, and Sinus Problem    HPI   C/o about 10 days ago with head congestion, drainage, productive cough.  Bringing up clear, green, yellow sputum and nasal drainage.  She is wheezing.   No sob.  No fever.  Some pain forehead.  Daughter and grandson and son in law with similar illness.  They didn't test for covid.  Prior smoker.   Using albuterol.    We discussed recent breast bx and new dx of breast cancer.        Review of Systems   Constitutional: Negative for fever and unexpected weight change.   HENT: Positive for nasal congestion and postnasal drip.    Eyes: Negative for pain, discharge and visual disturbance.   Respiratory: Positive for cough. Negative for chest tightness, shortness of breath and wheezing.    Cardiovascular: Negative for chest pain and leg swelling.   Gastrointestinal: Negative for abdominal pain, constipation, diarrhea and nausea.   Genitourinary: Negative for difficulty urinating, dysuria and hematuria.   Integumentary:  Negative for rash.   Neurological: Positive for headaches.   Psychiatric/Behavioral: Negative for dysphoric mood and sleep disturbance. The patient is not nervous/anxious.          Objective:      Physical Exam  Constitutional:       General: She is not in acute distress.     Appearance: She is well-developed.   HENT:      Head: Normocephalic and atraumatic.      Mouth/Throat:      Pharynx: No oropharyngeal exudate.   Cardiovascular:      Rate and Rhythm: Normal rate and regular rhythm.   Pulmonary:      Effort: Pulmonary effort is normal. No respiratory distress.      Breath sounds: Normal breath sounds. No wheezing or rales.      Comments: Bronchial cough    Musculoskeletal:      Cervical back: Neck supple.   Lymphadenopathy:      Cervical: No cervical adenopathy.   Neurological:      Mental Status: She is alert and oriented to person, place, and time.    Psychiatric:         Behavior: Behavior normal.         Assessment:       Problem List Items Addressed This Visit     Hyperlipidemia - Primary    Relevant Orders    Lipid Panel      Other Visit Diagnoses     Bacterial sinusitis        Relevant Orders    POCT COVID-19 Rapid Screening (Completed)    Bronchitis              Plan:       Bhavna was seen today for sore throat, otalgia, cough and sinus problem.    Diagnoses and all orders for this visit:    Hyperlipidemia, unspecified hyperlipidemia type  -     Lipid Panel; Future    Bacterial sinusitis  -     POCT COVID-19 Rapid Screening    Bronchitis    Other orders  -     amoxicillin-clavulanate 875-125mg (AUGMENTIN) 875-125 mg per tablet; Take 1 tablet by mouth 2 (two) times daily. for 10 days           Add lipids May   addendum-covid swab - negative.

## 2022-04-11 DIAGNOSIS — L40.50 PSORIATIC ARTHRITIS: ICD-10-CM

## 2022-04-11 RX ORDER — METHOTREXATE 2.5 MG/1
10 TABLET ORAL
Qty: 16 TABLET | Refills: 0 | Status: SHIPPED | OUTPATIENT
Start: 2022-04-11 | End: 2022-12-15 | Stop reason: SDUPTHER

## 2022-04-12 NOTE — TELEPHONE ENCOUNTER
Outgoing call to [facility]     PAP re-enrollment is approved, the patient was shipped a 90 days supply to home 04/12/2022.    PAP should be good until the end of the year.

## 2022-04-12 NOTE — TELEPHONE ENCOUNTER
Patient return call- she confirm she did received shipment 4/11 for Xeljanz.No other question or concern.

## 2022-04-13 ENCOUNTER — OFFICE VISIT (OUTPATIENT)
Dept: SURGERY | Facility: CLINIC | Age: 67
End: 2022-04-13
Payer: MEDICARE

## 2022-04-13 ENCOUNTER — LAB VISIT (OUTPATIENT)
Dept: LAB | Facility: HOSPITAL | Age: 67
End: 2022-04-13
Attending: SURGERY
Payer: MEDICARE

## 2022-04-13 ENCOUNTER — DOCUMENTATION ONLY (OUTPATIENT)
Dept: SURGERY | Facility: CLINIC | Age: 67
End: 2022-04-13
Payer: MEDICARE

## 2022-04-13 VITALS
BODY MASS INDEX: 18.77 KG/M2 | HEART RATE: 74 BPM | WEIGHT: 102 LBS | SYSTOLIC BLOOD PRESSURE: 111 MMHG | HEIGHT: 62 IN | DIASTOLIC BLOOD PRESSURE: 56 MMHG

## 2022-04-13 DIAGNOSIS — Z17.0 CARCINOMA OF UPPER-OUTER QUADRANT OF RIGHT BREAST IN FEMALE, ESTROGEN RECEPTOR POSITIVE: Primary | ICD-10-CM

## 2022-04-13 DIAGNOSIS — C50.911 INVASIVE DUCTAL CARCINOMA OF BREAST, FEMALE, RIGHT: Primary | ICD-10-CM

## 2022-04-13 DIAGNOSIS — C50.411 CARCINOMA OF UPPER-OUTER QUADRANT OF RIGHT BREAST IN FEMALE, ESTROGEN RECEPTOR POSITIVE: Primary | ICD-10-CM

## 2022-04-13 DIAGNOSIS — C50.911 INVASIVE DUCTAL CARCINOMA OF BREAST, FEMALE, RIGHT: ICD-10-CM

## 2022-04-13 DIAGNOSIS — Z01.818 PRE-OP TESTING: ICD-10-CM

## 2022-04-13 LAB
ALBUMIN SERPL BCP-MCNC: 3.6 G/DL (ref 3.5–5.2)
ALP SERPL-CCNC: 68 U/L (ref 55–135)
ALT SERPL W/O P-5'-P-CCNC: 15 U/L (ref 10–44)
ANION GAP SERPL CALC-SCNC: 8 MMOL/L (ref 8–16)
AST SERPL-CCNC: 23 U/L (ref 10–40)
BASOPHILS # BLD AUTO: 0.06 K/UL (ref 0–0.2)
BASOPHILS NFR BLD: 0.7 % (ref 0–1.9)
BILIRUB SERPL-MCNC: 0.2 MG/DL (ref 0.1–1)
BUN SERPL-MCNC: 5 MG/DL (ref 8–23)
CALCIUM SERPL-MCNC: 9.4 MG/DL (ref 8.7–10.5)
CHLORIDE SERPL-SCNC: 101 MMOL/L (ref 95–110)
CO2 SERPL-SCNC: 28 MMOL/L (ref 23–29)
CREAT SERPL-MCNC: 0.9 MG/DL (ref 0.5–1.4)
DIFFERENTIAL METHOD: ABNORMAL
EOSINOPHIL # BLD AUTO: 0.2 K/UL (ref 0–0.5)
EOSINOPHIL NFR BLD: 1.8 % (ref 0–8)
ERYTHROCYTE [DISTWIDTH] IN BLOOD BY AUTOMATED COUNT: 14.8 % (ref 11.5–14.5)
EST. GFR  (AFRICAN AMERICAN): >60 ML/MIN/1.73 M^2
EST. GFR  (NON AFRICAN AMERICAN): >60 ML/MIN/1.73 M^2
GLUCOSE SERPL-MCNC: 88 MG/DL (ref 70–110)
HCT VFR BLD AUTO: 40.8 % (ref 37–48.5)
HGB BLD-MCNC: 13.2 G/DL (ref 12–16)
IMM GRANULOCYTES # BLD AUTO: 0.02 K/UL (ref 0–0.04)
IMM GRANULOCYTES NFR BLD AUTO: 0.2 % (ref 0–0.5)
LYMPHOCYTES # BLD AUTO: 2.9 K/UL (ref 1–4.8)
LYMPHOCYTES NFR BLD: 35.6 % (ref 18–48)
MCH RBC QN AUTO: 31.7 PG (ref 27–31)
MCHC RBC AUTO-ENTMCNC: 32.4 G/DL (ref 32–36)
MCV RBC AUTO: 98 FL (ref 82–98)
MONOCYTES # BLD AUTO: 0.5 K/UL (ref 0.3–1)
MONOCYTES NFR BLD: 6.2 % (ref 4–15)
NEUTROPHILS # BLD AUTO: 4.6 K/UL (ref 1.8–7.7)
NEUTROPHILS NFR BLD: 55.5 % (ref 38–73)
NRBC BLD-RTO: 0 /100 WBC
PLATELET # BLD AUTO: 372 K/UL (ref 150–450)
PMV BLD AUTO: 8.3 FL (ref 9.2–12.9)
POTASSIUM SERPL-SCNC: 4.5 MMOL/L (ref 3.5–5.1)
PROT SERPL-MCNC: 6.3 G/DL (ref 6–8.4)
RBC # BLD AUTO: 4.17 M/UL (ref 4–5.4)
SODIUM SERPL-SCNC: 137 MMOL/L (ref 136–145)
WBC # BLD AUTO: 8.25 K/UL (ref 3.9–12.7)

## 2022-04-13 PROCEDURE — 4010F ACE/ARB THERAPY RXD/TAKEN: CPT | Mod: CPTII,S$GLB,, | Performed by: SURGERY

## 2022-04-13 PROCEDURE — 80053 COMPREHEN METABOLIC PANEL: CPT | Performed by: SURGERY

## 2022-04-13 PROCEDURE — 3008F PR BODY MASS INDEX (BMI) DOCUMENTED: ICD-10-PCS | Mod: CPTII,S$GLB,, | Performed by: SURGERY

## 2022-04-13 PROCEDURE — 4010F PR ACE/ARB THEARPY RXD/TAKEN: ICD-10-PCS | Mod: CPTII,S$GLB,, | Performed by: SURGERY

## 2022-04-13 PROCEDURE — 3078F DIAST BP <80 MM HG: CPT | Mod: CPTII,S$GLB,, | Performed by: SURGERY

## 2022-04-13 PROCEDURE — 99205 PR OFFICE/OUTPT VISIT, NEW, LEVL V, 60-74 MIN: ICD-10-PCS | Mod: S$GLB,,, | Performed by: SURGERY

## 2022-04-13 PROCEDURE — 1125F AMNT PAIN NOTED PAIN PRSNT: CPT | Mod: CPTII,S$GLB,, | Performed by: SURGERY

## 2022-04-13 PROCEDURE — 1101F PT FALLS ASSESS-DOCD LE1/YR: CPT | Mod: CPTII,S$GLB,, | Performed by: SURGERY

## 2022-04-13 PROCEDURE — 85025 COMPLETE CBC W/AUTO DIFF WBC: CPT | Performed by: SURGERY

## 2022-04-13 PROCEDURE — 99205 OFFICE O/P NEW HI 60 MIN: CPT | Mod: S$GLB,,, | Performed by: SURGERY

## 2022-04-13 PROCEDURE — 1101F PR PT FALLS ASSESS DOC 0-1 FALLS W/OUT INJ PAST YR: ICD-10-PCS | Mod: CPTII,S$GLB,, | Performed by: SURGERY

## 2022-04-13 PROCEDURE — 1159F MED LIST DOCD IN RCRD: CPT | Mod: CPTII,S$GLB,, | Performed by: SURGERY

## 2022-04-13 PROCEDURE — 3078F PR MOST RECENT DIASTOLIC BLOOD PRESSURE < 80 MM HG: ICD-10-PCS | Mod: CPTII,S$GLB,, | Performed by: SURGERY

## 2022-04-13 PROCEDURE — 1160F RVW MEDS BY RX/DR IN RCRD: CPT | Mod: CPTII,S$GLB,, | Performed by: SURGERY

## 2022-04-13 PROCEDURE — 99999 PR PBB SHADOW E&M-EST. PATIENT-LVL IV: ICD-10-PCS | Mod: PBBFAC,,, | Performed by: SURGERY

## 2022-04-13 PROCEDURE — 3288F PR FALLS RISK ASSESSMENT DOCUMENTED: ICD-10-PCS | Mod: CPTII,S$GLB,, | Performed by: SURGERY

## 2022-04-13 PROCEDURE — 3074F SYST BP LT 130 MM HG: CPT | Mod: CPTII,S$GLB,, | Performed by: SURGERY

## 2022-04-13 PROCEDURE — 1160F PR REVIEW ALL MEDS BY PRESCRIBER/CLIN PHARMACIST DOCUMENTED: ICD-10-PCS | Mod: CPTII,S$GLB,, | Performed by: SURGERY

## 2022-04-13 PROCEDURE — 36415 COLL VENOUS BLD VENIPUNCTURE: CPT | Performed by: SURGERY

## 2022-04-13 PROCEDURE — 3288F FALL RISK ASSESSMENT DOCD: CPT | Mod: CPTII,S$GLB,, | Performed by: SURGERY

## 2022-04-13 PROCEDURE — 99999 PR PBB SHADOW E&M-EST. PATIENT-LVL IV: CPT | Mod: PBBFAC,,, | Performed by: SURGERY

## 2022-04-13 PROCEDURE — 1125F PR PAIN SEVERITY QUANTIFIED, PAIN PRESENT: ICD-10-PCS | Mod: CPTII,S$GLB,, | Performed by: SURGERY

## 2022-04-13 PROCEDURE — 3008F BODY MASS INDEX DOCD: CPT | Mod: CPTII,S$GLB,, | Performed by: SURGERY

## 2022-04-13 PROCEDURE — 1159F PR MEDICATION LIST DOCUMENTED IN MEDICAL RECORD: ICD-10-PCS | Mod: CPTII,S$GLB,, | Performed by: SURGERY

## 2022-04-13 PROCEDURE — 3074F PR MOST RECENT SYSTOLIC BLOOD PRESSURE < 130 MM HG: ICD-10-PCS | Mod: CPTII,S$GLB,, | Performed by: SURGERY

## 2022-04-13 RX ORDER — ASPIRIN 81 MG/1
81 TABLET ORAL DAILY
COMMUNITY

## 2022-04-13 NOTE — PROGRESS NOTES
Genetics Lay Navigator Note:    Met with patient at her consult with Dr. Dhillon today 4/13/2022, to facilitate genetic testing. Set patient up with [employer] genetic counselor over the phone to complete counseling prior to testing. Patient verbalized understanding to all counseling information. [employer] brochure with number to call with questions or concerns provided to patient as well as my card. Encouraged patient to call me or [employer] at any time.     Lab appointment made and patient escorted with [employer] kit to lab for specimen draw and processing. Patient instructed that results will be provided as soon as they are available. No questions or concerns from patient about plan of care.     [employer] Tracking # [id number]

## 2022-04-13 NOTE — H&P (VIEW-ONLY)
Breast Surgery  [facility]  Department of Surgery      REFERRING PROVIDER: Mode Cornelius MD  [address] [place] [address]    Chief Complaint: Breast Cancer (New Patient Right Breast Cancer Invasive Duct Carcinoma 3/29/22 .)      Subjective:      Patient ID: Bhavna Landry is a 66 y.o. female who presents with right breast Invasive Ductal Carcinoma.     She presented for screening mammogram on 2022 for yearly scheduled screening.. This identified an asymmetry in the right breast. Follow-up mammogram and ultrasound on 2022 showed a 1 x 0.9 x 0.8 cm mass at the 9 o'clock position of the right breast 3 cm from the nipple. A ultrasound guided biopsy was performed on 2022 with pathology revealing invasive ductal carcinoma of the breast.     Findings at that time were the following:   Lesion 1:    Location:  Right, 09:00 o'clock, 3 cm from nipple   Clip:  Coil, in expected position  Tumor size:  1 cm   Tumor stgstrstastdstest:st st1st Estrogen Receptor:  Positive   Progesterone Receptor:  Negative   Her-2 jayla:  Negative   Lymph node status:  Clinically negative     Patient has not noted a change on breast exam.  Patient denies nipple discharge. Patient denies previous breast biopsy. Patient denies a personal history of breast cancer. Family history includes mother with colon cancer in father with lymphoma.     GYN History:  Age of menarche was 12. Menopause at 28 to when she had a hysterectomy.  Ovaries in place.  Patient denies hormonal therapy. Patient is . Age of first live birth was 22. Patient did not breast feed.      Past Medical History:   Diagnosis Date    Allergy     Amblyopia     Anemia     Anticoagulant long-term use     Arthritis 1992    Cataract     Depression     Dry eyes     Dry mouth     Duane's syndrome of right eye     Fibromyalgia 2014    Fibromyalgia     Fractured hip     RIGHT HIP    GERD (gastroesophageal reflux disease)     History of  psychiatric hospitalization     Hyperlipidemia 1992    Hypertension     Hypocalcemia     Hyponatremia     Kidney stone     Migraine headache     Osteoporosis     Psoriatic arthritis 1992    Right knee pain     post knee replacement surgery (possible rejectiion of metal)    RLS (restless legs syndrome)     Schizophrenia 1992    stable on meds    Squamous cell carcinoma of skin     Urinary tract infection      Past Surgical History:   Procedure Laterality Date    brow ptosis repair Right 2019    Surgeon: Dr. Karla Henson    CATARACT EXTRACTION       SECTION      COLONOSCOPY N/A 2016    Procedure: COLONOSCOPY;  Surgeon: ANDRIA Connelly MD;  Location: [address]);  Service: Endoscopy;  Laterality: N/A;    cyst removed from right sinus  1982    HYSTERECTOMY      VAGINAL HYSTERECTOMY WITHOUT BSO - ENDOMETRIOSIS    INJECTION OF ANESTHETIC AGENT AROUND MEDIAL BRANCH NERVES INNERVATING LUMBAR FACET JOINT N/A 2019    Procedure: Lumbo-sacral Block, DR5 and Lateral Branches of S1,S2, S3;  Surgeon: Michelle Pineda Jr., MD;  Location: [facility];  Service: Pain Management;  Laterality: N/A;  Pt takes  and states she holds ASA on her own whenever she has procedures.  Instructed to hold x 3 days prior to procedure.      INJECTION OF ANESTHETIC AGENT INTO SACROILIAC JOINT Right 2018    Procedure: BLOCK, SACROILIAC JOINT-Right- ORAL SEDATION;  Surgeon: Michelle Pineda Jr., MD;  Location: [facility];  Service: Pain Management;  Laterality: Right;    INJECTION OF ANESTHETIC AGENT INTO SACROILIAC JOINT Bilateral 2018    Procedure: BLOCK, SACROILIAC JOINT-BILATERAL;  Surgeon: Michelle Pineda Jr., MD;  Location: [facility];  Service: Pain Management;  Laterality: Bilateral;  Please keep at 10:00 due to trasnsportation    INJECTION OF ANESTHETIC AGENT INTO SACROILIAC JOINT Bilateral 2019    Procedure: Bilateral Sacroiliac Joint  Injection - Per Dr Pineda, not necessary to hold ASA.;  Surgeon: Michelle Pineda Jr., MD;  Location: [facility] PAIN T;  Service: Pain Management;  Laterality: Bilateral;    INTRAOCULAR PROSTHESES INSERTION Right 8/1/2019    Procedure: INSERTION, IOL PROSTHESIS;  Surgeon: Karen Song MD;  Location: [facility] OR [address] [place] [address];  Service: Ophthalmology;  Laterality: Right;    INTRAOCULAR PROSTHESES INSERTION Left 9/26/2019    Procedure: INSERTION, IOL PROSTHESIS;  Surgeon: Karen Song MD;  Location: [facility] OR [address] [place] [address];  Service: Ophthalmology;  Laterality: Left;    JOINT REPLACEMENT Right     knee    KNEE SURGERY      PHACOEMULSIFICATION OF CATARACT Right 8/1/2019    Procedure: PHACOEMULSIFICATION, CATARACT;  Surgeon: Karen Song MD;  Location: [facility] OR [address] [place] [address];  Service: Ophthalmology;  Laterality: Right;    PHACOEMULSIFICATION OF CATARACT Left 9/26/2019    Procedure: PHACOEMULSIFICATION, CATARACT;  Surgeon: Karen Song MD;  Location: [facility] OR [address] [place] [address];  Service: Ophthalmology;  Laterality: Left;    RADIOFREQUENCY THERMOCOAGULATION Right 5/7/2019    Procedure: RADIOFREQUENCY THERMAL COAGULATION RIGHT DORSAL RAMUS 5 AND LATERAL BRANCH OF S1, S2 AND S3;  Surgeon: Michelle Pineda Jr., MD;  Location: [facility];  Service: Pain Management;  Laterality: Right;    RADIOFREQUENCY THERMOCOAGULATION Left 5/14/2019    Procedure: RADIOFREQUENCY THERMAL COAGULATION LEFT DORSAL RAMUS 5 AND LATERAL BRANCH OF S1,S2 AND S3;  Surgeon: Michelle Pineda Jr., MD;  Location: [facility] PAIN [facility]GT;  Service: Pain Management;  Laterality: Left;    RADIOFREQUENCY THERMOCOAGULATION Right 10/22/2019    Procedure: RADIOFREQUENCY THERMAL COAGULATION---DARSAL RAMUS 5 and LATERAL S1,S2,and S3 Right;  Surgeon: Michelle Pineda Jr., MD;  Location: [facility];  Service: Pain Management;  Laterality: Right;  patient to sign consent DOS    RADIOFREQUENCY THERMOCOAGULATION Left 10/29/2019    Procedure: RADIOFREQUENCY THERMAL  COAGULATION - LEFT - DR5, S1,S2, AND S3;  Surgeon: Michelle Pineda Jr., MD;  Location: [facility];  Service: Pain Management;  Laterality: Left;    RADIOFREQUENCY THERMOCOAGULATION Left 5/26/2020    Procedure: RADIOFREQUENCY THERMAL COAGULATION--Left DR5+ lateral branches of S1, S2, S3;  Surgeon: Michelle Pineda Jr., MD;  Location: [facility];  Service: Pain Management;  Laterality: Left;    RADIOFREQUENCY THERMOCOAGULATION Right 6/2/2020    Procedure: RADIOFREQUENCY THERMAL COAGULATION--Right DR5+ lateral branches of S1, S2, S3;  Surgeon: Michelle Pineda Jr., MD;  Location: [facility];  Service: Pain Management;  Laterality: Right;    Surgery on right knee  07/09/1982    tumor removed from back left side upper shoulder  03/17/2006     Current Outpatient Medications on File Prior to Visit   Medication Sig Dispense Refill    albuterol (PROAIR HFA) 90 mcg/actuation inhaler 2 puffs qid prn 54 g 3    amLODIPine (NORVASC) 10 MG tablet Take 1 tablet (10 mg total) by mouth once daily. 30 tablet 11    amoxicillin-clavulanate 875-125mg (AUGMENTIN) 875-125 mg per tablet Take 1 tablet by mouth 2 (two) times daily. for 10 days 20 tablet 0    ascorbic acid, vitamin C, (VITAMIN C) 500 MG tablet Take 500 mg by mouth once daily.      aspirin (ECOTRIN) 81 MG EC tablet Take 81 mg by mouth once daily.      atorvastatin (LIPITOR) 80 MG tablet Take 1 tablet (80 mg total) by mouth once daily. 90 tablet 3    b complex vitamins tablet Take 1 tablet by mouth once daily.      benztropine (COGENTIN) 0.5 MG tablet Take 1 tablet (0.5 mg total) by mouth 2 (two) times daily. 180 tablet 3    cyanocobalamin 500 MCG tablet Take 500 mcg by mouth once daily.      diclofenac sodium (VOLTAREN) 1 % Gel Apply 2 g topically 4 (four) times daily as needed. 3 each 3    fluticasone propionate (FLONASE) 50 mcg/actuation nasal spray SHAKE LIQUID WELL AND USE 1 SPRAY IN EACH NOSTRIL EVERY DAY 48 g 0    folic acid (FOLVITE) 1 MG  tablet Take 1 tablet (1 mg total) by mouth once daily. 90 tablet 3    gabapentin (NEURONTIN) 300 MG capsule Take 1 capsule (300 mg total) by mouth every evening. 90 capsule 3    guselkumab (TREMFYA) 100 mg/mL AtIn Start 100mg sc x 1 wk 0, 4 then q 8wks 3 mL 1    isosorbide mononitrate (IMDUR) 30 MG 24 hr tablet TAKE 1 TABLET(30 MG) BY MOUTH EVERY DAY 30 tablet 11    lisinopriL (PRINIVIL,ZESTRIL) 5 MG tablet Take 2 tablets (10 mg total) by mouth once daily. 180 tablet 0    methotrexate 2.5 MG Tab Take 4 tablets (10 mg total) by mouth every 7 days. 16 tablet 0    multivitamin (THERAGRAN) per tablet Take 1 tablet by mouth once daily.      risperiDONE (RISPERDAL) 2 MG tablet Take 1 tablet (2 mg total) by mouth every morning. 90 tablet 3    risperiDONE (RISPERDAL) 4 MG tablet TAKE 1 TABLET(4 MG) BY MOUTH EVERY EVENING 90 tablet 3    traZODone (DESYREL) 50 MG tablet Take 1 tablet (50 mg total) by mouth nightly as needed for Insomnia. 90 tablet 3    venlafaxine (EFFEXOR-XR) 75 MG 24 hr capsule TAKE 1 CAPSULE(75 MG) BY MOUTH EVERY DAY 30 capsule 11    vitamin D (VITAMIN D3) 1000 units Tab Take 1,000 Units by mouth once daily.      vitamin E 200 UNIT capsule Take 200 Units by mouth once daily.      zinc gluconate 50 mg tablet Take 50 mg by mouth once daily.      azelastine (ASTELIN) 137 mcg (0.1 %) nasal spray 1 spray (137 mcg total) by Nasal route 2 (two) times daily. 30 mL 0    clopidogreL (PLAVIX) 75 mg tablet Take 75 mg by mouth once daily.       Current Facility-Administered Medications on File Prior to Visit   Medication Dose Route Frequency Provider Last Rate Last Admin    0.9%  NaCl infusion  500 mL Intravenous Continuous Michelle Pineda Jr., MD        0.9%  NaCl infusion  500 mL Intravenous Continuous Michelle Pineda Jr., MD        lidocaine (PF) 10 mg/ml (1%) injection 10 mg  1 mL Intradermal Once Michelle Pineda Jr., MD        phenylephrine HCL 2.5% ophthalmic solution 1 drop  1 drop Right  Eye On Call Procedure Karen Song MD   1 drop at 08/01/19 0648    proparacaine 0.5 % ophthalmic solution 1 drop  1 drop Right Eye On Call Procedure Karen Song MD   1 drop at 08/01/19 0638    tropicamide 1% ophthalmic solution 1 drop  1 drop Right Eye On Call Procedure Karen Song MD   1 drop at 08/01/19 0648     Social History     Socioeconomic History    Marital status:     Number of children: 1   Occupational History    Occupation: retired asst  [employer] [address].     Employer: [employer] 2010   Tobacco Use    Smoking status: Current Every Day Smoker     Packs/day: 0.25     Years: 10.00     Pack years: 2.50     Types: Cigarettes    Smokeless tobacco: Former User    Tobacco comment: about 5 cig a day   Substance and Sexual Activity    Alcohol use: No    Drug use: No    Sexual activity: Not Currently     Partners: Male     Birth control/protection: Post-menopausal   Social History Narrative    Exercise:  Childcare.        Ett:  3 days a week        Daughter and her 3 kids live with her.         Social Determinants of Health     Financial Resource Strain: Low Risk     Difficulty of Paying Living Expenses: Not hard at all   Food Insecurity: No Food Insecurity    Worried About Running Out of Food in the Last Year: Never true    Ran Out of Food in the Last Year: Never true   Transportation Needs: No Transportation Needs    Lack of Transportation (Medical): No    Lack of Transportation (Non-Medical): No   Physical Activity: Sufficiently Active    Days of Exercise per Week: 5 days    Minutes of Exercise per Session: 40 min   Stress: No Stress Concern Present    Feeling of Stress : Not at all   Social Connections: Socially Isolated    Frequency of Communication with Friends and Family: More than three times a week    Frequency of Social Gatherings with Friends and Family: More than three times a week    Attends [employer] Services: Never    Active Member of Clubs or  "Organizations: No    Marital Status:    Housing Stability: Low Risk     Unable to Pay for Housing in the Last Year: No    Number of Places Lived in the Last Year: 1    Unstable Housing in the Last Year: No     Family History   Problem Relation Age of Onset    Colon cancer Mother     Psoriasis Mother     Cancer Mother         colon    Depression Mother     Cancer Father         lymphoma    Stroke Sister     Diabetes Brother     Arthritis Brother     Heart disease Brother         cad    No Known Problems Daughter     Hypertension Neg Hx     Suicide Neg Hx     Amblyopia Neg Hx     Blindness Neg Hx     Cataracts Neg Hx     Glaucoma Neg Hx     Macular degeneration Neg Hx     Retinal detachment Neg Hx     Strabismus Neg Hx         Review of Systems   All other systems reviewed and are negative.    Objective:   BP (!) 111/56 (BP Location: Right arm, Patient Position: Sitting, BP Method: Medium (Automatic))   Pulse 74   Ht 5' 2" (1.575 m)   Wt 46.3 kg (102 lb)   BMI 18.66 kg/m²     Physical Exam   Constitutional: She is oriented to person, place, and time. She appears well-developed and well-nourished.   HENT:   Head: Normocephalic and atraumatic.   Cardiovascular: Normal rate, regular rhythm and normal heart sounds.  Exam reveals no gallop and no friction rub.    No murmur heard.  Pulmonary/Chest: Effort normal and breath sounds normal. No respiratory distress. She has no wheezes. She has no rales. Right breast exhibits no inverted nipple, no mass, no nipple discharge, no skin change and no tenderness. Left breast exhibits no inverted nipple, no mass, no nipple discharge, no skin change and no tenderness.       Lymphadenopathy:     She has no cervical adenopathy.     She has no axillary adenopathy.        Right: No supraclavicular adenopathy present.        Left: No supraclavicular adenopathy present.   Neurological: She is alert and oriented to person, place, and time.   Skin: Skin is " warm and dry. No rash noted. No erythema. No pallor.     Psychiatric: She has a normal mood and affect. Her behavior is normal. Judgment and thought content normal.       Radiology review: Images personally reviewed by me in the clinic and shown to the patient during the consultation.     Assessment:       1. Pre-op testing        Plan:   right breast, Clinical Stage IA ([id number]), invasive ductal carcinoma of the Upper-outer quadrant of breast, estrogen receptor Positive, progesterone receptor Negative, HER2 Negative    Multidisciplinary nature of breast cancer care was discussed in detail at today's visit.    She was concerned about her left breast and her increased breast density.  I offered the patient a breast MRI to further evaluate her breast prior to proceeding with surgery.    We discussed that surgical options would include a lumpectomy versus a mastectomy.  We discussed there is no survival benefit to undergoing a mastectomy compared to lumpectomy.  Based on clinical exam and imaging, she would be a good candidate for breast conservation.  Surgical technique and rationale was discussed with the patient.  We also discussed the option for mastectomy.  We discussed this procedure in detail.  We discussed this could be done with or without reconstruction.  We discussed reconstructive options of breast implant and tissue based reconstruction.  She strongly desires mastectomy.  She does not wish for reconstruction.  The risks benefits of the surgery discussed with the patient including the risk of infection, bleeding, fluid collections, nerve injury, numbness and cosmetic concerns.  We discussed that she has increased risk of wound complications due to her tobacco smoking.  The option for a contralateral mastectomy was briefly discussed.  She does not desire a contralateral mastectomy at this time but may consider it if her genetics or MRI come back with concerning findings.    We also discussed axillary staging  using sentinel node biopsy.  [place] lymph node biopsies performed utilizing the injection of blue and radioactive dye.  This dye travels to the 1st few lymph nodes that drain the breast.  Lymph nodes that uptake the blue or radioactive dye or are palpable are surgically removed and sent to pathology.  Typically 1-5 lymph nodes are removed during this procedure although exact numbers vary depending on the patient.  This procedure allows sampling of the lymph nodes most at risk for metastasis. Risks include but are not limited to lymphedema, bleeding, infection, poor cosmesis, numbness of the incision site, seroma, failure of dye to map, nerve injury leading to permanent arm numbness and or muscle weakness, possibility of a false negative finding, and need for additional surgery.  If metastatic disease is identified on pathology of the lymph nodes been axillary dissection (a second surgery) may be recommended.  We discussed the side effects of the blue dye which include: discoloration of the urine, stool and breast and a small risk of anaphylaxis.  We also discussed the possibility that the axilla would not map, which could necessitate another surgery to assess the axilla.  We reviewed the risk of lymphedema with the sentinel lymph node procedure is 2-10%.      Smoking cessation was discussed with the patient.  We discussed importance of smoking cessation for surgery.  Smoking affects but the skin healing ability and she would be at high risk of wound complications if she continues to smoke.  Preferably she would quit at least 2 weeks prior to surgery.  The use of nicotine has an impact on wound healing in the us the use of nicotine products for smoking cessation is not recommended.    The role of adjuvant radiation therapy following breast conservation surgery was also discussed with the patient. We discussed that when looking at all patient populations risk of local recurrence with lumpectomy alone is high and  radiation therapy is recommended in most cases. We discussed that final recommendations on radiation would be based on final surgical pathology. The duration and treatment side effects were discussed with  the patient.  She'll be referred to radiation oncology post-operatively for further discussion of her options.     We also discussed the role of systemic therapy in the treatment of early stage breast cancer. We discussed that this is based on tumor biology and jose l status and will be determined based on final pathology.  She has an early stage hormone receptor positive tumor. I do not anticipate she will need chemotherapy although final recommendations are pending surgical pathology. She will be recommended for anti-estrogen therapy. We discussed that if the cancer is hormone positive, endocrine therapy would be recommended in most cases and its use can reduce the risk of recurrence as well as improve survival. Side effects of treatment were briefly discussed. She'll be referred to medical oncology post-operatively for further discussion of her options.     We discussed that genetic testing can be offered for all breast cancer in patient.  This was discussed in detail and she she would like to proceed.    Following her discussion today, Bhavna Landry is motivated to undergo a right mastectomy and right axillary sentinel lymph node biopsy.  MRI and genetics pending.  She does not desire reconstruction.  Smoking cessation recommended.    Surgery will be scheduled. Follow-up in clinic roughly 14 days after surgery.      Patient was given patient information binder including [facility] treatment brochure.  All her questions were answered.    Total time spent with the patient: 60 minutes.  40 minutes of face to face consultation and 20 minutes of chart review and coordination of care.

## 2022-04-13 NOTE — PROGRESS NOTES
Breast Surgery  [facility]  Department of Surgery      REFERRING PROVIDER: Mode Cornelius MD  [address] [place] [address]    Chief Complaint: Breast Cancer (New Patient Right Breast Cancer Invasive Duct Carcinoma 3/29/22 .)      Subjective:      Patient ID: Bhavna Landry is a 66 y.o. female who presents with right breast Invasive Ductal Carcinoma.     She presented for screening mammogram on 2022 for yearly scheduled screening.. This identified an asymmetry in the right breast. Follow-up mammogram and ultrasound on 2022 showed a 1 x 0.9 x 0.8 cm mass at the 9 o'clock position of the right breast 3 cm from the nipple. A ultrasound guided biopsy was performed on 2022 with pathology revealing invasive ductal carcinoma of the breast.     Findings at that time were the following:   Lesion 1:    Location:  Right, 09:00 o'clock, 3 cm from nipple   Clip:  Coil, in expected position  Tumor size:  1 cm   Tumor rdgrdrrdarddrderd:rd rd3rd Estrogen Receptor:  Positive   Progesterone Receptor:  Negative   Her-2 jayla:  Negative   Lymph node status:  Clinically negative     Patient has not noted a change on breast exam.  Patient denies nipple discharge. Patient denies previous breast biopsy. Patient denies a personal history of breast cancer. Family history includes mother with colon cancer in father with lymphoma.     GYN History:  Age of menarche was 12. Menopause at 28 to when she had a hysterectomy.  Ovaries in place.  Patient denies hormonal therapy. Patient is . Age of first live birth was 22. Patient did not breast feed.      Past Medical History:   Diagnosis Date    Allergy     Amblyopia     Anemia     Anticoagulant long-term use     Arthritis 1992    Cataract     Depression     Dry eyes     Dry mouth     Duane's syndrome of right eye     Fibromyalgia 2014    Fibromyalgia     Fractured hip     RIGHT HIP    GERD (gastroesophageal reflux disease)     History of  psychiatric hospitalization     Hyperlipidemia 1992    Hypertension     Hypocalcemia     Hyponatremia     Kidney stone     Migraine headache     Osteoporosis     Psoriatic arthritis 1992    Right knee pain     post knee replacement surgery (possible rejectiion of metal)    RLS (restless legs syndrome)     Schizophrenia 1992    stable on meds    Squamous cell carcinoma of skin     Urinary tract infection      Past Surgical History:   Procedure Laterality Date    brow ptosis repair Right 2019    Surgeon: Dr. Krala Henson    CATARACT EXTRACTION       SECTION      COLONOSCOPY N/A 2016    Procedure: COLONOSCOPY;  Surgeon: ANDRIA Connelly MD;  Location: [address]);  Service: Endoscopy;  Laterality: N/A;    cyst removed from right sinus  1982    HYSTERECTOMY      VAGINAL HYSTERECTOMY WITHOUT BSO - ENDOMETRIOSIS    INJECTION OF ANESTHETIC AGENT AROUND MEDIAL BRANCH NERVES INNERVATING LUMBAR FACET JOINT N/A 2019    Procedure: Lumbo-sacral Block, DR5 and Lateral Branches of S1,S2, S3;  Surgeon: Michelle Pineda Jr., MD;  Location: [facility];  Service: Pain Management;  Laterality: N/A;  Pt takes  and states she holds ASA on her own whenever she has procedures.  Instructed to hold x 3 days prior to procedure.      INJECTION OF ANESTHETIC AGENT INTO SACROILIAC JOINT Right 2018    Procedure: BLOCK, SACROILIAC JOINT-Right- ORAL SEDATION;  Surgeon: Michelle Pineda Jr., MD;  Location: [facility];  Service: Pain Management;  Laterality: Right;    INJECTION OF ANESTHETIC AGENT INTO SACROILIAC JOINT Bilateral 2018    Procedure: BLOCK, SACROILIAC JOINT-BILATERAL;  Surgeon: Michelle Pineda Jr., MD;  Location: [facility];  Service: Pain Management;  Laterality: Bilateral;  Please keep at 10:00 due to trasnsportation    INJECTION OF ANESTHETIC AGENT INTO SACROILIAC JOINT Bilateral 2019    Procedure: Bilateral Sacroiliac Joint  Injection - Per Dr Pineda, not necessary to hold ASA.;  Surgeon: Michelle Pineda Jr., MD;  Location: [facility] T;  Service: Pain Management;  Laterality: Bilateral;    INTRAOCULAR PROSTHESES INSERTION Right 8/1/2019    Procedure: INSERTION, IOL PROSTHESIS;  Surgeon: Karen Song MD;  Location: [facility] OR [address] [place] [address];  Service: Ophthalmology;  Laterality: Right;    INTRAOCULAR PROSTHESES INSERTION Left 9/26/2019    Procedure: INSERTION, IOL PROSTHESIS;  Surgeon: Karen Song MD;  Location: [facility] OR [address] [place] [address];  Service: Ophthalmology;  Laterality: Left;    JOINT REPLACEMENT Right     knee    KNEE SURGERY      PHACOEMULSIFICATION OF CATARACT Right 8/1/2019    Procedure: PHACOEMULSIFICATION, CATARACT;  Surgeon: Karen Song MD;  Location: [facility] OR [address] [place] [address];  Service: Ophthalmology;  Laterality: Right;    PHACOEMULSIFICATION OF CATARACT Left 9/26/2019    Procedure: PHACOEMULSIFICATION, CATARACT;  Surgeon: Karen Song MD;  Location: [facility] OR [address] [place] [address];  Service: Ophthalmology;  Laterality: Left;    RADIOFREQUENCY THERMOCOAGULATION Right 5/7/2019    Procedure: RADIOFREQUENCY THERMAL COAGULATION RIGHT DORSAL RAMUS 5 AND LATERAL BRANCH OF S1, S2 AND S3;  Surgeon: Michelle Pineda Jr., MD;  Location: [facility];  Service: Pain Management;  Laterality: Right;    RADIOFREQUENCY THERMOCOAGULATION Left 5/14/2019    Procedure: RADIOFREQUENCY THERMAL COAGULATION LEFT DORSAL RAMUS 5 AND LATERAL BRANCH OF S1,S2 AND S3;  Surgeon: Michelle Pineda Jr., MD;  Location: [facility];  Service: Pain Management;  Laterality: Left;    RADIOFREQUENCY THERMOCOAGULATION Right 10/22/2019    Procedure: RADIOFREQUENCY THERMAL COAGULATION---DARSAL RAMUS 5 and LATERAL S1,S2,and S3 Right;  Surgeon: Michelle Pineda Jr., MD;  Location: [facility];  Service: Pain Management;  Laterality: Right;  patient to sign consent DOS    RADIOFREQUENCY THERMOCOAGULATION Left 10/29/2019    Procedure: RADIOFREQUENCY THERMAL  COAGULATION - LEFT - DR5, S1,S2, AND S3;  Surgeon: Michelle Pineda Jr., MD;  Location: [facility];  Service: Pain Management;  Laterality: Left;    RADIOFREQUENCY THERMOCOAGULATION Left 5/26/2020    Procedure: RADIOFREQUENCY THERMAL COAGULATION--Left DR5+ lateral branches of S1, S2, S3;  Surgeon: Michelle Pineda Jr., MD;  Location: [facility];  Service: Pain Management;  Laterality: Left;    RADIOFREQUENCY THERMOCOAGULATION Right 6/2/2020    Procedure: RADIOFREQUENCY THERMAL COAGULATION--Right DR5+ lateral branches of S1, S2, S3;  Surgeon: Michelle Pineda Jr., MD;  Location: [facility];  Service: Pain Management;  Laterality: Right;    Surgery on right knee  07/09/1982    tumor removed from back left side upper shoulder  03/17/2006     Current Outpatient Medications on File Prior to Visit   Medication Sig Dispense Refill    albuterol (PROAIR HFA) 90 mcg/actuation inhaler 2 puffs qid prn 54 g 3    amLODIPine (NORVASC) 10 MG tablet Take 1 tablet (10 mg total) by mouth once daily. 30 tablet 11    amoxicillin-clavulanate 875-125mg (AUGMENTIN) 875-125 mg per tablet Take 1 tablet by mouth 2 (two) times daily. for 10 days 20 tablet 0    ascorbic acid, vitamin C, (VITAMIN C) 500 MG tablet Take 500 mg by mouth once daily.      aspirin (ECOTRIN) 81 MG EC tablet Take 81 mg by mouth once daily.      atorvastatin (LIPITOR) 80 MG tablet Take 1 tablet (80 mg total) by mouth once daily. 90 tablet 3    b complex vitamins tablet Take 1 tablet by mouth once daily.      benztropine (COGENTIN) 0.5 MG tablet Take 1 tablet (0.5 mg total) by mouth 2 (two) times daily. 180 tablet 3    cyanocobalamin 500 MCG tablet Take 500 mcg by mouth once daily.      diclofenac sodium (VOLTAREN) 1 % Gel Apply 2 g topically 4 (four) times daily as needed. 3 each 3    fluticasone propionate (FLONASE) 50 mcg/actuation nasal spray SHAKE LIQUID WELL AND USE 1 SPRAY IN EACH NOSTRIL EVERY DAY 48 g 0    folic acid (FOLVITE) 1 MG  tablet Take 1 tablet (1 mg total) by mouth once daily. 90 tablet 3    gabapentin (NEURONTIN) 300 MG capsule Take 1 capsule (300 mg total) by mouth every evening. 90 capsule 3    guselkumab (TREMFYA) 100 mg/mL AtIn Start 100mg sc x 1 wk 0, 4 then q 8wks 3 mL 1    isosorbide mononitrate (IMDUR) 30 MG 24 hr tablet TAKE 1 TABLET(30 MG) BY MOUTH EVERY DAY 30 tablet 11    lisinopriL (PRINIVIL,ZESTRIL) 5 MG tablet Take 2 tablets (10 mg total) by mouth once daily. 180 tablet 0    methotrexate 2.5 MG Tab Take 4 tablets (10 mg total) by mouth every 7 days. 16 tablet 0    multivitamin (THERAGRAN) per tablet Take 1 tablet by mouth once daily.      risperiDONE (RISPERDAL) 2 MG tablet Take 1 tablet (2 mg total) by mouth every morning. 90 tablet 3    risperiDONE (RISPERDAL) 4 MG tablet TAKE 1 TABLET(4 MG) BY MOUTH EVERY EVENING 90 tablet 3    traZODone (DESYREL) 50 MG tablet Take 1 tablet (50 mg total) by mouth nightly as needed for Insomnia. 90 tablet 3    venlafaxine (EFFEXOR-XR) 75 MG 24 hr capsule TAKE 1 CAPSULE(75 MG) BY MOUTH EVERY DAY 30 capsule 11    vitamin D (VITAMIN D3) 1000 units Tab Take 1,000 Units by mouth once daily.      vitamin E 200 UNIT capsule Take 200 Units by mouth once daily.      zinc gluconate 50 mg tablet Take 50 mg by mouth once daily.      azelastine (ASTELIN) 137 mcg (0.1 %) nasal spray 1 spray (137 mcg total) by Nasal route 2 (two) times daily. 30 mL 0    clopidogreL (PLAVIX) 75 mg tablet Take 75 mg by mouth once daily.       Current Facility-Administered Medications on File Prior to Visit   Medication Dose Route Frequency Provider Last Rate Last Admin    0.9%  NaCl infusion  500 mL Intravenous Continuous Michelle Pineda Jr., MD        0.9%  NaCl infusion  500 mL Intravenous Continuous Michelle Pineda Jr., MD        lidocaine (PF) 10 mg/ml (1%) injection 10 mg  1 mL Intradermal Once Michelle Pineda Jr., MD        phenylephrine HCL 2.5% ophthalmic solution 1 drop  1 drop Right  Eye On Call Procedure Karen Song MD   1 drop at 08/01/19 0648    proparacaine 0.5 % ophthalmic solution 1 drop  1 drop Right Eye On Call Procedure Karen Song MD   1 drop at 08/01/19 0638    tropicamide 1% ophthalmic solution 1 drop  1 drop Right Eye On Call Procedure Karen Song MD   1 drop at 08/01/19 0648     Social History     Socioeconomic History    Marital status:     Number of children: 1   Occupational History    Occupation: retired asst  [employer] [address] [employer]     Employer: [employer]   Tobacco Use    Smoking status: Current Every Day Smoker     Packs/day: 0.25     Years: 10.00     Pack years: 2.50     Types: Cigarettes    Smokeless tobacco: Former User    Tobacco comment: about 5 cig a day   Substance and Sexual Activity    Alcohol use: No    Drug use: No    Sexual activity: Not Currently     Partners: Male     Birth control/protection: Post-menopausal   Social History Narrative    Exercise:  Childcare.        Ett:  3 days a week        Daughter and her 3 kids live with her.         Social Determinants of Health     Financial Resource Strain: Low Risk     Difficulty of Paying Living Expenses: Not hard at all   Food Insecurity: No Food Insecurity    Worried About Running Out of Food in the Last Year: Never true    Ran Out of Food in the Last Year: Never true   Transportation Needs: No Transportation Needs    Lack of Transportation (Medical): No    Lack of Transportation (Non-Medical): No   Physical Activity: Sufficiently Active    Days of Exercise per Week: 5 days    Minutes of Exercise per Session: 40 min   Stress: No Stress Concern Present    Feeling of Stress : Not at all   Social Connections: Socially Isolated    Frequency of Communication with Friends and Family: More than three times a week    Frequency of Social Gatherings with Friends and Family: More than three times a week    Attends Jew Services: Never    Active Member of Clubs or  "Organizations: No    Marital Status:    Housing Stability: Low Risk     Unable to Pay for Housing in the Last Year: No    Number of Places Lived in the Last Year: 1    Unstable Housing in the Last Year: No     Family History   Problem Relation Age of Onset    Colon cancer Mother     Psoriasis Mother     Cancer Mother         colon    Depression Mother     Cancer Father         lymphoma    Stroke Sister     Diabetes Brother     Arthritis Brother     Heart disease Brother         cad    No Known Problems Daughter     Hypertension Neg Hx     Suicide Neg Hx     Amblyopia Neg Hx     Blindness Neg Hx     Cataracts Neg Hx     Glaucoma Neg Hx     Macular degeneration Neg Hx     Retinal detachment Neg Hx     Strabismus Neg Hx         Review of Systems   All other systems reviewed and are negative.    Objective:   BP (!) 111/56 (BP Location: Right arm, Patient Position: Sitting, BP Method: Medium (Automatic))   Pulse 74   Ht 5' 2" (1.575 m)   Wt 46.3 kg (102 lb)   BMI 18.66 kg/m²     Physical Exam   Constitutional: She is oriented to person, place, and time. She appears well-developed and well-nourished.   HENT:   Head: Normocephalic and atraumatic.   Cardiovascular: Normal rate, regular rhythm and normal heart sounds.  Exam reveals no gallop and no friction rub.    No murmur heard.  Pulmonary/Chest: Effort normal and breath sounds normal. No respiratory distress. She has no wheezes. She has no rales. Right breast exhibits no inverted nipple, no mass, no nipple discharge, no skin change and no tenderness. Left breast exhibits no inverted nipple, no mass, no nipple discharge, no skin change and no tenderness.       Lymphadenopathy:     She has no cervical adenopathy.     She has no axillary adenopathy.        Right: No supraclavicular adenopathy present.        Left: No supraclavicular adenopathy present.   Neurological: She is alert and oriented to person, place, and time.   Skin: Skin is " warm and dry. No rash noted. No erythema. No pallor.     Psychiatric: She has a normal mood and affect. Her behavior is normal. Judgment and thought content normal.       Radiology review: Images personally reviewed by me in the clinic and shown to the patient during the consultation.     Assessment:       1. Pre-op testing        Plan:   right breast, Clinical Stage IA ([id number]), invasive ductal carcinoma of the Upper-outer quadrant of breast, estrogen receptor Positive, progesterone receptor Negative, HER2 Negative    Multidisciplinary nature of breast cancer care was discussed in detail at today's visit.    She was concerned about her left breast and her increased breast density.  I offered the patient a breast MRI to further evaluate her breast prior to proceeding with surgery.    We discussed that surgical options would include a lumpectomy versus a mastectomy.  We discussed there is no survival benefit to undergoing a mastectomy compared to lumpectomy.  Based on clinical exam and imaging, she would be a good candidate for breast conservation.  Surgical technique and rationale was discussed with the patient.  We also discussed the option for mastectomy.  We discussed this procedure in detail.  We discussed this could be done with or without reconstruction.  We discussed reconstructive options of breast implant and tissue based reconstruction.  She strongly desires mastectomy.  She does not wish for reconstruction.  The risks benefits of the surgery discussed with the patient including the risk of infection, bleeding, fluid collections, nerve injury, numbness and cosmetic concerns.  We discussed that she has increased risk of wound complications due to her tobacco smoking.  The option for a contralateral mastectomy was briefly discussed.  She does not desire a contralateral mastectomy at this time but may consider it if her genetics or MRI come back with concerning findings.    We also discussed axillary staging  using sentinel node biopsy.  [place] lymph node biopsies performed utilizing the injection of blue and radioactive dye.  This dye travels to the 1st few lymph nodes that drain the breast.  Lymph nodes that uptake the blue or radioactive dye or are palpable are surgically removed and sent to pathology.  Typically 1-5 lymph nodes are removed during this procedure although exact numbers vary depending on the patient.  This procedure allows sampling of the lymph nodes most at risk for metastasis. Risks include but are not limited to lymphedema, bleeding, infection, poor cosmesis, numbness of the incision site, seroma, failure of dye to map, nerve injury leading to permanent arm numbness and or muscle weakness, possibility of a false negative finding, and need for additional surgery.  If metastatic disease is identified on pathology of the lymph nodes been axillary dissection (a second surgery) may be recommended.  We discussed the side effects of the blue dye which include: discoloration of the urine, stool and breast and a small risk of anaphylaxis.  We also discussed the possibility that the axilla would not map, which could necessitate another surgery to assess the axilla.  We reviewed the risk of lymphedema with the sentinel lymph node procedure is 2-10%.      Smoking cessation was discussed with the patient.  We discussed importance of smoking cessation for surgery.  Smoking affects but the skin healing ability and she would be at high risk of wound complications if she continues to smoke.  Preferably she would quit at least 2 weeks prior to surgery.  The use of nicotine has an impact on wound healing in the us the use of nicotine products for smoking cessation is not recommended.    The role of adjuvant radiation therapy following breast conservation surgery was also discussed with the patient. We discussed that when looking at all patient populations risk of local recurrence with lumpectomy alone is high and  radiation therapy is recommended in most cases. We discussed that final recommendations on radiation would be based on final surgical pathology. The duration and treatment side effects were discussed with  the patient.  She'll be referred to radiation oncology post-operatively for further discussion of her options.     We also discussed the role of systemic therapy in the treatment of early stage breast cancer. We discussed that this is based on tumor biology and jose l status and will be determined based on final pathology.  She has an early stage hormone receptor positive tumor. I do not anticipate she will need chemotherapy although final recommendations are pending surgical pathology. She will be recommended for anti-estrogen therapy. We discussed that if the cancer is hormone positive, endocrine therapy would be recommended in most cases and its use can reduce the risk of recurrence as well as improve survival. Side effects of treatment were briefly discussed. She'll be referred to medical oncology post-operatively for further discussion of her options.     We discussed that genetic testing can be offered for all breast cancer in patient.  This was discussed in detail and she she would like to proceed.    Following her discussion today, Bhavna Landry is motivated to undergo a right mastectomy and right axillary sentinel lymph node biopsy.  MRI and genetics pending.  She does not desire reconstruction.  Smoking cessation recommended.    Surgery will be scheduled. Follow-up in clinic roughly 14 days after surgery.      Patient was given patient information binder [facility].  All her questions were answered.    Total time spent with the patient: 60 minutes.  40 minutes of face to face consultation and 20 minutes of chart review and coordination of care.

## 2022-04-13 NOTE — NURSING
Nurse Navigator Note:     Met with patient during her consult with Dr. Dhillon.  Patient and I reviewed the information she discussed with Dr. Dhillon, including treatment options, diagnosis, and future plans for workup. Patient and I went through the new patient binder, explained some of the information and why it is provided.     Also offered patient consults with our other specialty clinics: Dr. Andrade for gynecological health during treatment, our breast physical therapy department for pre-op and post-operative assessments, Dr. Staples for psychological support, and [facility] for nutritional counseling. Explained to patient that all of these support services are completely optional. Discussed that physical therapy may call patient to offer pre-op appt, and what that appt would entail.     Patient was given a copy of her appointments, Dr. Dhillon's card, and my card. Encouraged her to call me if she has any questions or concerns or would like to schedule any additional appointments. Verbalized understanding of all information.   Oncology Navigation   Intake  Date of Diagnosis: 3/29/2022  Cancer Type: Breast  Internal / External Referral: Internal  Referral Source: Sadaf Vargas  Date of Referral: 3/23/2022  Initial Nurse Navigator Contact: 4/13/2022  Referral to Initial Contact Timeline (days): 21  Date Worked: 4/13/2022  First Appointment Available: 4/13/2022  Appointment Date: 4/13/2022  First Available Date vs. Scheduled Date (days): 0     Treatment  Current Status: Staging work-up    Surgery: Planned  Surgical Oncologist: Alejo  Type of Surgery: R mastectomy and SLNB with no recon  Consult Date: 4/13/2022          Procedures: Biopsy; Genetic test; Ultrasound; Mammogram; MRI  Biopsy Schedule Date: 3/29/2022  Genetic Testing Date Sent: 4/13/2022  Mammo Schedule Date: 3/22/2022  MRI Schedule Date: 4/14/2022  Ultrasound Schedule Date: 3/22/2022       ER: Positive  DE: Negative  Her2: Negative       Support Systems:  Family members     Acuity  Treatment Tolerability: Has not started treatment yet/treatment fully completed and side effects resolved  Hospitalization Within the Past Month: None   Needed: No  Support: Patient reports adequate support system  Verbalizes Financial Concerns: No  Transportation: Adequate transportation for treatment  History of noncompliance/frequent no shows and cancellations: No  Verbalizes the need for more education: No  Navigation Acuity: 0     Follow Up  Follow up in about 2 days (around 4/15/2022) for MRI.

## 2022-04-14 ENCOUNTER — HOSPITAL ENCOUNTER (OUTPATIENT)
Dept: RADIOLOGY | Facility: HOSPITAL | Age: 67
Discharge: HOME | End: 2022-04-14
Attending: SURGERY
Payer: MEDICARE

## 2022-04-14 DIAGNOSIS — C50.911 INVASIVE DUCTAL CARCINOMA OF BREAST, FEMALE, RIGHT: ICD-10-CM

## 2022-04-14 DIAGNOSIS — C50.911 INVASIVE DUCTAL CARCINOMA OF BREAST, FEMALE, RIGHT: Primary | ICD-10-CM

## 2022-04-14 PROCEDURE — 77049 MRI BREAST C-+ W/CAD BI: CPT | Mod: TC

## 2022-04-14 PROCEDURE — A9577 INJ MULTIHANCE: HCPCS | Performed by: SURGERY

## 2022-04-14 PROCEDURE — 25500020 PHARM REV CODE 255: Performed by: SURGERY

## 2022-04-14 PROCEDURE — 77049 MRI BREAST W/WO CONTRAST, W/CAD, BILATERAL: ICD-10-PCS | Mod: 26,,, | Performed by: RADIOLOGY

## 2022-04-14 PROCEDURE — 77049 MRI BREAST C-+ W/CAD BI: CPT | Mod: 26,,, | Performed by: RADIOLOGY

## 2022-04-14 RX ADMIN — GADOBENATE DIMEGLUMINE 10 ML: 529 INJECTION, SOLUTION INTRAVENOUS at 07:04

## 2022-04-18 ENCOUNTER — PATIENT MESSAGE (OUTPATIENT)
Dept: ADMINISTRATIVE | Facility: OTHER | Age: 67
End: 2022-04-18
Payer: MEDICARE

## 2022-04-19 ENCOUNTER — PATIENT MESSAGE (OUTPATIENT)
Dept: SURGERY | Facility: CLINIC | Age: 67
End: 2022-04-19
Payer: MEDICARE

## 2022-04-19 ENCOUNTER — PATIENT OUTREACH (OUTPATIENT)
Dept: ADMINISTRATIVE | Facility: HOSPITAL | Age: 67
End: 2022-04-19
Payer: MEDICARE

## 2022-04-19 NOTE — PROGRESS NOTES
Health Maintenance Due   Topic Date Due    Colorectal Cancer Screening  05/30/2019          updated. Immunizations reconciled.     Orin Alanis LPN   Clinical Care Coordinator  Primary Care and Wellness

## 2022-04-20 ENCOUNTER — TELEPHONE (OUTPATIENT)
Dept: SURGERY | Facility: CLINIC | Age: 67
End: 2022-04-20
Payer: MEDICARE

## 2022-04-20 DIAGNOSIS — E78.5 HYPERLIPIDEMIA, UNSPECIFIED HYPERLIPIDEMIA TYPE: ICD-10-CM

## 2022-04-20 RX ORDER — ATORVASTATIN CALCIUM 80 MG/1
TABLET, FILM COATED ORAL
Qty: 90 TABLET | Refills: 3 | Status: SHIPPED | OUTPATIENT
Start: 2022-04-20 | End: 2023-01-09 | Stop reason: SDUPTHER

## 2022-04-20 NOTE — TELEPHONE ENCOUNTER
Care Due:                  Date            Visit Type   Department     Provider  --------------------------------------------------------------------------------                                EP -                              PRIMARY      [facility] INTERNAL  Last Visit: 04-      CARE ([facility])   MEDICINE       MILLIE TELLO                              [facility]                              PRIMARY      [facility] INTERNAL  Next Visit: 05-      CARE ([facility])   MEDICINE       MILLIE TELLO                                                            Last  Test          Frequency    Reason                     Performed    Due Date  --------------------------------------------------------------------------------    Lipid Panel.  12 months..  atorvastatin.............  02- 02-    Powered by [employer]. Reference number: [id number].   4/20/2022 2:46:34 PM CDT

## 2022-04-20 NOTE — TELEPHONE ENCOUNTER
Returned patient call regarding genetic testing questions.  All questions answered and patient voiced understanding of information given.

## 2022-04-20 NOTE — TELEPHONE ENCOUNTER
Refill Routing Note   Medication(s) are not appropriate for processing by [facility] for the following reason(s):      - Required laboratory values are outdated    [facility] action(s):  Defer Medication-related problems identified: Requires labs        Medication reconciliation completed: No     Appointments  past 12m or future 3m with PCP    Date Provider   Last Visit   4/7/2022 Sadaf Vargas MD   Next Visit   5/30/2022 Sadaf Vargas MD   ED visits in past 90 days: 0        Note composed:3:55 PM 04/20/2022

## 2022-04-25 ENCOUNTER — HOSPITAL ENCOUNTER (OUTPATIENT)
Dept: PREADMISSION TESTING | Facility: OTHER | Age: 67
Discharge: HOME | End: 2022-04-25
Attending: SURGERY
Payer: MEDICARE

## 2022-04-25 ENCOUNTER — ANESTHESIA EVENT (OUTPATIENT)
Dept: SURGERY | Facility: OTHER | Age: 67
End: 2022-04-25
Payer: MEDICARE

## 2022-04-25 VITALS
WEIGHT: 103 LBS | SYSTOLIC BLOOD PRESSURE: 118 MMHG | BODY MASS INDEX: 18.95 KG/M2 | DIASTOLIC BLOOD PRESSURE: 58 MMHG | OXYGEN SATURATION: 98 % | HEIGHT: 62 IN | HEART RATE: 70 BPM

## 2022-04-25 DIAGNOSIS — Z17.0 CARCINOMA OF UPPER-OUTER QUADRANT OF RIGHT BREAST IN FEMALE, ESTROGEN RECEPTOR POSITIVE: ICD-10-CM

## 2022-04-25 DIAGNOSIS — C50.411 CARCINOMA OF UPPER-OUTER QUADRANT OF RIGHT BREAST IN FEMALE, ESTROGEN RECEPTOR POSITIVE: ICD-10-CM

## 2022-04-25 PROCEDURE — 93010 ELECTROCARDIOGRAM REPORT: CPT | Mod: ,,, | Performed by: INTERNAL MEDICINE

## 2022-04-25 PROCEDURE — 93010 EKG 12-LEAD: ICD-10-PCS | Mod: ,,, | Performed by: INTERNAL MEDICINE

## 2022-04-25 PROCEDURE — 93005 ELECTROCARDIOGRAM TRACING: CPT

## 2022-04-25 RX ORDER — AMOXICILLIN 500 MG
CAPSULE ORAL DAILY
COMMUNITY

## 2022-04-25 RX ORDER — SODIUM CHLORIDE, SODIUM LACTATE, POTASSIUM CHLORIDE, CALCIUM CHLORIDE 600; 310; 30; 20 MG/100ML; MG/100ML; MG/100ML; MG/100ML
INJECTION, SOLUTION INTRAVENOUS CONTINUOUS
Status: CANCELLED | OUTPATIENT
Start: 2022-04-25

## 2022-04-25 NOTE — DISCHARGE INSTRUCTIONS
Information to Prepare you for your Surgery    PRE-ADMIT TESTING -  [phone]    [address] [facility]          Your surgery has been scheduled at [facility]. We are pleased to have the opportunity to serve you. For Further Information please call [phone].    On the day of surgery please report to the Information Desk on the 1st floor.    CONTACT YOUR PHYSICIAN'S OFFICE THE DAY PRIOR TO YOUR SURGERY TO OBTAIN YOUR ARRIVAL TIME.     The evening before surgery do not eat anything after 9 p.m. ( this includes hard candy, chewing gum and mints).  You may only have GATORADE, POWERADE AND WATER  from 9 p.m. until you leave your home.   DO NOT DRINK ANY LIQUIDS ON THE WAY TO THE HOSPITAL.      Why does your anesthesiologist allow you to drink Gatorade/Powerade before surgery?  Gatorade/Powerade helps to increase your comfort before surgery and to decrease your nausea after surgery. The carbohydrates in Gatorade/Powerade help reduce your body's stress response to surgery.  If you are a diabetic-drink only water prior to surgery.      Current Visitor policy(12/27/2021) - Patients may have 2 visitors pre and post procedure. Only 2 visitors will be allowed in the Surgical building with the patient.     SPECIAL MEDICATION INSTRUCTIONS: TAKE medications checked off by the Anesthesiologist on your Medication List.    Angiogram Patients: Take medications as instructed by your physician, including aspirin.     Surgery Patients:    If you take ASPIRIN - Your PHYSICIAN/SURGEON will need to inform you IF/OR when you need to stop taking aspirin prior to your surgery.     Do Not take any medications containing IBUPROFEN.    Do Not Wear any make-up (especially eye make-up) to surgery. Please remove any false eyelashes or eyelash extensions. If you arrive the day of surgery with makeup/eyelashes on you will be required to remove prior to surgery. (There is a risk of corneal  abrasions if eye makeup/eyelash extensions are not removed)      Leave all valuables at home.   Do Not wear any jewelry or watches, including any metal in body piercings. Jewelry must be removed prior to coming to the hospital.  There is a possibility that rings that are unable to be removed may be cut off if they are on the surgical extremity.    Please remove all hair extensions, wigs, clips and any other metal accessories/ ornaments from your hair.  These items may pose a flammable/fire risk in Surgery and must be removed.    Do not shave your surgical area at least 5 days prior to your surgery. The surgical prep will be performed at the hospital according to Infection Control regulations.    Contact Lens must be removed before surgery. Either do not wear the contact lens or bring a case and solution for storage.  Please bring a container for eyeglasses or dentures as required.  Bring any paperwork your physician has provided, such as consent forms,  history and physicals, doctor's orders, etc.   Bring comfortable clothes that are loose fitting to wear upon discharge. Take into consideration the type of surgery being performed.  Maintain your diet as advised per your physician the day prior to surgery.      Adequate rest the night before surgery is advised.   Park in the Parking lot behind the hospital or in the [facility] across the street from the parking lot. Parking is complimentary.  If you will be discharged the same day as your procedure, please arrange for a responsible adult to drive you home or to accompany you if traveling by taxi.   YOU WILL NOT BE PERMITTED TO DRIVE OR TO LEAVE THE HOSPITAL ALONE AFTER SURGERY.   If you are being discharged the same day, it is strongly recommended that you arrange for someone to remain with you for the first 24 hrs following your surgery.    The Surgeon will speak to your family/visitor after your surgery regarding the outcome of your surgery and post op  care.  The Surgeon may speak to you after your surgery, but there is a possibility you may not remember the details.  Please check with your family members regarding the conversation with the Surgeon.    We strongly recommend whoever is bringing you home be present for discharge instructions.  This will ensure a thorough understanding for your post op home care.    ALL CHILDREN MUST ALWAYS BE ACCOMPANIED BY AN ADULT.    Visitors-Refer to current Visitor policy handouts.    Thank you for your cooperation.  The Staff of [facility].            Bathing Instructions with Hibiclens    Shower the evening before and morning of your procedure with Hibiclens:  Wash your face with water and your regular face wash/soap  Apply Hibiclens directly on your skin or on a wet washcloth and wash gently. When showering: Move away from the shower stream when applying Hibiclens to avoid rinsing off too soon.  Rinse thoroughly with warm water  Do not dilute Hibiclens        Dry off as usual, do not use any deodorant, powder, body lotions, perfume, after shave or cologne.

## 2022-04-25 NOTE — ANESTHESIA PREPROCEDURE EVALUATION
04/25/2022  Bhavna Landry is a 66 y.o., female.      Pre-op Assessment    I have reviewed the Patient Summary Reports.     I have reviewed the Nursing Notes.    I have reviewed the Medications.     Review of Systems  Anesthesia Hx:  Hx of Anesthetic complications Reports hypertensive urgency during colonoscopy many years ago Denies Family Hx of Anesthesia complications.   Denies Personal Hx of Anesthesia complications.   Social:  Smoker, No Alcohol Use    Hematology/Oncology:         -- Anemia: Hematology Comments: Last hgb 13.2 Current/Recent Cancer. Breast right Oncology Comments: Skin Cancer     Cardiovascular:   Exercise tolerance: good Hypertension Valvular problems/Murmurs, AS Dysrhythmias hyperlipidemia Paroxysmal Afib  Mild carotid stenosis    Echo 3/31/21: EF 75%, mild aortic stenosis    Stress test 2016 (-)    Walks a mile everyday   Pulmonary:   Denies COPD.  Denies Shortness of breath. Chronic bronchitis   Renal/:  Renal/ Normal     Hepatic/GI:   GERD, well controlled    Musculoskeletal:   Arthritis  Fibromyalgia  Myofascial pain syndrome  Sacroilitis  Rheumatoid Arthritis  S/p TKA Spine Disorders: lumbar Degenerative disease    Neurological:   Denies CVA. Headaches Denies Seizures. Cognitive slowing  Extrapyramidal syndrome   Endocrine:  Endocrine Normal    Psych:   depression Paranoid Schizophrenia         Physical Exam  General: Cooperative, Flat Affect and Well nourished    Airway:  Mallampati: II   Mouth Opening: Normal  TM Distance: Normal  Neck ROM: Normal ROM    Dental:  Dentures        Anesthesia Plan  Type of Anesthesia, risks & benefits discussed:    Anesthesia Type: Gen ETT  Intra-op Monitoring Plan: Standard ASA Monitors  Post Op Pain Control Plan: multimodal analgesia  Induction:  IV  Airway Plan: Video  Informed Consent: Informed consent signed with the Patient and all parties  understand the risks and agree with anesthesia plan.  All questions answered.   ASA Score: 3  Day of Surgery Review of History & Physical: H&P Update referred to the surgeon/provider.    Ready For Surgery From Anesthesia Perspective.     .

## 2022-05-02 ENCOUNTER — TELEPHONE (OUTPATIENT)
Dept: SURGERY | Facility: CLINIC | Age: 67
End: 2022-05-02
Payer: MEDICARE

## 2022-05-02 ENCOUNTER — PATIENT MESSAGE (OUTPATIENT)
Dept: SURGERY | Facility: CLINIC | Age: 67
End: 2022-05-02
Payer: MEDICARE

## 2022-05-02 NOTE — TELEPHONE ENCOUNTER
Returned call to Mrs. Landry regarding message left regarding FMLA papers. Patient stated her daughter doesn't need the [place] papers filled out for her job any more.  will still be having her surgery on 05/09/22 with  at the [facility].

## 2022-05-02 NOTE — TELEPHONE ENCOUNTER
Attempted to contact patient regarding genetic testing results.  Patient did not answer message left with my name and direct number for patient to contact back.

## 2022-05-02 NOTE — TELEPHONE ENCOUNTER
Genetic Lay Navigator Note:    Called patient with the results of genetic testing. 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 Discussed that the results do not indicate any further action is needed at this time, and that the company will notify us if any additional recommendations become available. Explained that we will provide a formal copy of report via [employer] or mail to patient.    Offered patient a phone session with a genetic counselor through [employer], or a in person session with our [facility] genetic counselors. Also discussed that this will remain an available option for patient at any time in the future.     Patient was instructed to call with any additional questions or concerns. Verbalized understanding of all information.    Provider notified of results and that patient was given them.     Report # 1          Report # 2

## 2022-05-06 ENCOUNTER — TELEPHONE (OUTPATIENT)
Dept: SURGERY | Facility: CLINIC | Age: 67
End: 2022-05-06
Payer: MEDICARE

## 2022-05-06 NOTE — TELEPHONE ENCOUNTER
Message was left for  regarding arrival time for surgery on 5/9/22 at the [facility] . Arrival time is for 5 am surgery is scheduled for 7 am. Nothing to eat after 9 pm Sunday 5/8/22. Patient may have clear liquids up until 4 hours before surgery.Patient may have 2 people with her the morning of surgery. Please leave all jewelry home . Also please wear a button down shirt . Please call Delaney if you have any [phone] ext: [phone] .

## 2022-05-06 NOTE — TELEPHONE ENCOUNTER
Called  for the 2nd time regarding her arrival time for surgery on 5/09/22 at the [facility] . Nothing to eat after 9:00 pm on 5/8/22 Sunday evening . Patient may have clear liquids up until 4 hours before surgery. Patient informed she may have 2 people with her the morning of surgery. Asked patient to please leave all Jewelry home . Also to wear a button down shirt the morning of surgery.  voiced understanding to this call.

## 2022-05-09 ENCOUNTER — HOSPITAL ENCOUNTER (OUTPATIENT)
Facility: OTHER | Age: 67
Discharge: HOME | End: 2022-05-09
Attending: SURGERY | Admitting: SURGERY
Payer: MEDICARE

## 2022-05-09 ENCOUNTER — HOSPITAL ENCOUNTER (OUTPATIENT)
Dept: RADIOLOGY | Facility: OTHER | Age: 67
Discharge: HOME | End: 2022-05-09
Attending: SURGERY
Payer: MEDICARE

## 2022-05-09 ENCOUNTER — ANESTHESIA (OUTPATIENT)
Dept: SURGERY | Facility: OTHER | Age: 67
End: 2022-05-09
Payer: MEDICARE

## 2022-05-09 VITALS
DIASTOLIC BLOOD PRESSURE: 66 MMHG | RESPIRATION RATE: 18 BRPM | HEIGHT: 62 IN | OXYGEN SATURATION: 94 % | SYSTOLIC BLOOD PRESSURE: 129 MMHG | TEMPERATURE: 98 F | HEART RATE: 91 BPM | BODY MASS INDEX: 18.95 KG/M2 | WEIGHT: 103 LBS

## 2022-05-09 DIAGNOSIS — C50.919 BREAST CANCER: ICD-10-CM

## 2022-05-09 DIAGNOSIS — C50.411 CARCINOMA OF UPPER-OUTER QUADRANT OF RIGHT BREAST IN FEMALE, ESTROGEN RECEPTOR POSITIVE: ICD-10-CM

## 2022-05-09 DIAGNOSIS — Z17.0 CARCINOMA OF UPPER-OUTER QUADRANT OF RIGHT BREAST IN FEMALE, ESTROGEN RECEPTOR POSITIVE: Primary | ICD-10-CM

## 2022-05-09 DIAGNOSIS — Z17.0 CARCINOMA OF UPPER-OUTER QUADRANT OF RIGHT BREAST IN FEMALE, ESTROGEN RECEPTOR POSITIVE: ICD-10-CM

## 2022-05-09 DIAGNOSIS — C50.411 CARCINOMA OF UPPER-OUTER QUADRANT OF RIGHT BREAST IN FEMALE, ESTROGEN RECEPTOR POSITIVE: Primary | ICD-10-CM

## 2022-05-09 PROCEDURE — 19303 MAST SIMPLE COMPLETE: CPT | Mod: RT,,, | Performed by: SURGERY

## 2022-05-09 PROCEDURE — 63600175 PHARM REV CODE 636 W HCPCS: Performed by: ANESTHESIOLOGY

## 2022-05-09 PROCEDURE — 38525 PR BIOPSY/REM LYMPH NODES, AXILLARY: ICD-10-PCS | Mod: 51,RT,, | Performed by: SURGERY

## 2022-05-09 PROCEDURE — 37000008 HC ANESTHESIA 1ST 15 MINUTES: Performed by: SURGERY

## 2022-05-09 PROCEDURE — 37000009 HC ANESTHESIA EA ADD 15 MINS: Performed by: SURGERY

## 2022-05-09 PROCEDURE — 38900 IO MAP OF SENT LYMPH NODE: CPT | Mod: RT,,, | Performed by: SURGERY

## 2022-05-09 PROCEDURE — 76098 X-RAY EXAM SURGICAL SPECIMEN: CPT | Mod: 26,,, | Performed by: SURGERY

## 2022-05-09 PROCEDURE — 71000033 HC RECOVERY, INTIAL HOUR: Performed by: SURGERY

## 2022-05-09 PROCEDURE — 88342 CHG IMMUNOCYTOCHEMISTRY: ICD-10-PCS | Mod: 26,,, | Performed by: STUDENT IN AN ORGANIZED HEALTH CARE EDUCATION/TRAINING PROGRAM

## 2022-05-09 PROCEDURE — 25000003 PHARM REV CODE 250: Performed by: ANESTHESIOLOGY

## 2022-05-09 PROCEDURE — 63600175 PHARM REV CODE 636 W HCPCS: Performed by: SURGERY

## 2022-05-09 PROCEDURE — 88341 PR IHC OR ICC EACH ADD'L SINGLE ANTIBODY  STAINPR: ICD-10-PCS | Mod: 26,,, | Performed by: STUDENT IN AN ORGANIZED HEALTH CARE EDUCATION/TRAINING PROGRAM

## 2022-05-09 PROCEDURE — 88307 PR  SURG PATH,LEVEL V: ICD-10-PCS | Mod: 26,,, | Performed by: STUDENT IN AN ORGANIZED HEALTH CARE EDUCATION/TRAINING PROGRAM

## 2022-05-09 PROCEDURE — 38525 BIOPSY/REMOVAL LYMPH NODES: CPT | Mod: 51,RT,, | Performed by: SURGERY

## 2022-05-09 PROCEDURE — 36000707: Performed by: SURGERY

## 2022-05-09 PROCEDURE — 88341 IMHCHEM/IMCYTCHM EA ADD ANTB: CPT | Mod: 26,,, | Performed by: STUDENT IN AN ORGANIZED HEALTH CARE EDUCATION/TRAINING PROGRAM

## 2022-05-09 PROCEDURE — 71000015 HC POSTOP RECOV 1ST HR: Performed by: SURGERY

## 2022-05-09 PROCEDURE — 88341 IMHCHEM/IMCYTCHM EA ADD ANTB: CPT | Mod: 59 | Performed by: STUDENT IN AN ORGANIZED HEALTH CARE EDUCATION/TRAINING PROGRAM

## 2022-05-09 PROCEDURE — 27201423 OPTIME MED/SURG SUP & DEVICES STERILE SUPPLY: Performed by: SURGERY

## 2022-05-09 PROCEDURE — 88342 IMHCHEM/IMCYTCHM 1ST ANTB: CPT | Mod: 26,,, | Performed by: STUDENT IN AN ORGANIZED HEALTH CARE EDUCATION/TRAINING PROGRAM

## 2022-05-09 PROCEDURE — 38900 PR INTRAOPERATIVE SENTINEL LYMPH NODE ID W DYE INJECTION: ICD-10-PCS | Mod: RT,,, | Performed by: SURGERY

## 2022-05-09 PROCEDURE — 88307 TISSUE EXAM BY PATHOLOGIST: CPT | Mod: 59 | Performed by: STUDENT IN AN ORGANIZED HEALTH CARE EDUCATION/TRAINING PROGRAM

## 2022-05-09 PROCEDURE — 38792 RA TRACER ID OF SENTINL NODE: CPT | Mod: 59,51,RT, | Performed by: SURGERY

## 2022-05-09 PROCEDURE — 63600175 PHARM REV CODE 636 W HCPCS: Performed by: NURSE ANESTHETIST, CERTIFIED REGISTERED

## 2022-05-09 PROCEDURE — 71000016 HC POSTOP RECOV ADDL HR: Performed by: SURGERY

## 2022-05-09 PROCEDURE — 25000003 PHARM REV CODE 250: Performed by: NURSE ANESTHETIST, CERTIFIED REGISTERED

## 2022-05-09 PROCEDURE — 36000706: Performed by: SURGERY

## 2022-05-09 PROCEDURE — 38792 PR IDENTIFY SENTINEL 2DE: ICD-10-PCS | Mod: 59,51,RT, | Performed by: SURGERY

## 2022-05-09 PROCEDURE — 88342 IMHCHEM/IMCYTCHM 1ST ANTB: CPT | Mod: 59 | Performed by: STUDENT IN AN ORGANIZED HEALTH CARE EDUCATION/TRAINING PROGRAM

## 2022-05-09 PROCEDURE — C9290 INJ, BUPIVACAINE LIPOSOME: HCPCS | Performed by: SURGERY

## 2022-05-09 PROCEDURE — 88307 TISSUE EXAM BY PATHOLOGIST: CPT | Mod: 26,,, | Performed by: STUDENT IN AN ORGANIZED HEALTH CARE EDUCATION/TRAINING PROGRAM

## 2022-05-09 PROCEDURE — 76098 PR  X-RAY EXAM, BREAST SPECIMEN: ICD-10-PCS | Mod: 26,,, | Performed by: SURGERY

## 2022-05-09 PROCEDURE — A9520 TC99 TILMANOCEPT DIAG 0.5MCI: HCPCS

## 2022-05-09 PROCEDURE — 19303 PR MASTECTOMY, SIMPLE, COMPLETE: ICD-10-PCS | Mod: RT,,, | Performed by: SURGERY

## 2022-05-09 RX ORDER — OXYCODONE HYDROCHLORIDE 5 MG/1
5 TABLET ORAL
Status: DISCONTINUED | OUTPATIENT
Start: 2022-05-09 | End: 2022-05-09 | Stop reason: HOSPADM

## 2022-05-09 RX ORDER — ROCURONIUM BROMIDE 10 MG/ML
INJECTION, SOLUTION INTRAVENOUS
Status: DISCONTINUED | OUTPATIENT
Start: 2022-05-09 | End: 2022-05-09

## 2022-05-09 RX ORDER — SODIUM CHLORIDE 0.9 % (FLUSH) 0.9 %
3 SYRINGE (ML) INJECTION
Status: DISCONTINUED | OUTPATIENT
Start: 2022-05-09 | End: 2022-05-09 | Stop reason: HOSPADM

## 2022-05-09 RX ORDER — SODIUM CHLORIDE 9 MG/ML
INJECTION, SOLUTION INTRAVENOUS CONTINUOUS
Status: DISCONTINUED | OUTPATIENT
Start: 2022-05-09 | End: 2022-05-09 | Stop reason: HOSPADM

## 2022-05-09 RX ORDER — HYDROMORPHONE HYDROCHLORIDE 2 MG/ML
0.4 INJECTION, SOLUTION INTRAMUSCULAR; INTRAVENOUS; SUBCUTANEOUS EVERY 5 MIN PRN
Status: DISCONTINUED | OUTPATIENT
Start: 2022-05-09 | End: 2022-05-09 | Stop reason: HOSPADM

## 2022-05-09 RX ORDER — CEFAZOLIN SODIUM 1 G/3ML
2 INJECTION, POWDER, FOR SOLUTION INTRAMUSCULAR; INTRAVENOUS
Status: COMPLETED | OUTPATIENT
Start: 2022-05-09 | End: 2022-05-09

## 2022-05-09 RX ORDER — ONDANSETRON 2 MG/ML
INJECTION INTRAMUSCULAR; INTRAVENOUS
Status: DISCONTINUED | OUTPATIENT
Start: 2022-05-09 | End: 2022-05-09

## 2022-05-09 RX ORDER — MEPERIDINE HYDROCHLORIDE 25 MG/ML
12.5 INJECTION INTRAMUSCULAR; INTRAVENOUS; SUBCUTANEOUS ONCE AS NEEDED
Status: DISCONTINUED | OUTPATIENT
Start: 2022-05-09 | End: 2022-05-09 | Stop reason: HOSPADM

## 2022-05-09 RX ORDER — DEXAMETHASONE SODIUM PHOSPHATE 4 MG/ML
INJECTION, SOLUTION INTRA-ARTICULAR; INTRALESIONAL; INTRAMUSCULAR; INTRAVENOUS; SOFT TISSUE
Status: DISCONTINUED | OUTPATIENT
Start: 2022-05-09 | End: 2022-05-09

## 2022-05-09 RX ORDER — EPHEDRINE SULFATE 50 MG/ML
INJECTION, SOLUTION INTRAVENOUS
Status: DISCONTINUED | OUTPATIENT
Start: 2022-05-09 | End: 2022-05-09

## 2022-05-09 RX ORDER — PROCHLORPERAZINE EDISYLATE 5 MG/ML
5 INJECTION INTRAMUSCULAR; INTRAVENOUS EVERY 30 MIN PRN
Status: DISCONTINUED | OUTPATIENT
Start: 2022-05-09 | End: 2022-05-09 | Stop reason: HOSPADM

## 2022-05-09 RX ORDER — SODIUM CHLORIDE, SODIUM LACTATE, POTASSIUM CHLORIDE, CALCIUM CHLORIDE 600; 310; 30; 20 MG/100ML; MG/100ML; MG/100ML; MG/100ML
INJECTION, SOLUTION INTRAVENOUS CONTINUOUS
Status: DISCONTINUED | OUTPATIENT
Start: 2022-05-09 | End: 2022-05-09 | Stop reason: HOSPADM

## 2022-05-09 RX ORDER — DIPHENHYDRAMINE HYDROCHLORIDE 50 MG/ML
25 INJECTION INTRAMUSCULAR; INTRAVENOUS EVERY 6 HOURS PRN
Status: DISCONTINUED | OUTPATIENT
Start: 2022-05-09 | End: 2022-05-09 | Stop reason: HOSPADM

## 2022-05-09 RX ORDER — PROPOFOL 10 MG/ML
VIAL (ML) INTRAVENOUS
Status: DISCONTINUED | OUTPATIENT
Start: 2022-05-09 | End: 2022-05-09

## 2022-05-09 RX ORDER — OXYCODONE HYDROCHLORIDE 5 MG/1
5 TABLET ORAL EVERY 4 HOURS PRN
Qty: 16 TABLET | Refills: 0 | Status: SHIPPED | OUTPATIENT
Start: 2022-05-09 | End: 2022-05-16 | Stop reason: SDUPTHER

## 2022-05-09 RX ORDER — LIDOCAINE HYDROCHLORIDE 20 MG/ML
INJECTION INTRAVENOUS
Status: DISCONTINUED | OUTPATIENT
Start: 2022-05-09 | End: 2022-05-09

## 2022-05-09 RX ORDER — FENTANYL CITRATE 50 UG/ML
INJECTION, SOLUTION INTRAMUSCULAR; INTRAVENOUS
Status: DISCONTINUED | OUTPATIENT
Start: 2022-05-09 | End: 2022-05-09

## 2022-05-09 RX ORDER — ISOSULFAN BLUE 50 MG/5ML
INJECTION, SOLUTION SUBCUTANEOUS
Status: DISCONTINUED | OUTPATIENT
Start: 2022-05-09 | End: 2022-05-09 | Stop reason: HOSPADM

## 2022-05-09 RX ADMIN — OXYCODONE 5 MG: 5 TABLET ORAL at 09:05

## 2022-05-09 RX ADMIN — EPHEDRINE SULFATE 20 MG: 50 INJECTION INTRAVENOUS at 08:05

## 2022-05-09 RX ADMIN — LIDOCAINE HYDROCHLORIDE 100 MG: 20 INJECTION, SOLUTION INTRAVENOUS at 07:05

## 2022-05-09 RX ADMIN — CARBOXYMETHYLCELLULOSE SODIUM 2 DROP: 2.5 SOLUTION/ DROPS OPHTHALMIC at 07:05

## 2022-05-09 RX ADMIN — EPHEDRINE SULFATE 15 MG: 50 INJECTION INTRAVENOUS at 08:05

## 2022-05-09 RX ADMIN — PHENYLEPHRINE HYDROCHLORIDE 0.1 MCG/KG/MIN: 10 INJECTION INTRAVENOUS at 07:05

## 2022-05-09 RX ADMIN — EPHEDRINE SULFATE 15 MG: 50 INJECTION INTRAVENOUS at 07:05

## 2022-05-09 RX ADMIN — ROCURONIUM BROMIDE 30 MG: 10 INJECTION, SOLUTION INTRAVENOUS at 07:05

## 2022-05-09 RX ADMIN — DEXAMETHASONE SODIUM PHOSPHATE 8 MG: 4 INJECTION, SOLUTION INTRAMUSCULAR; INTRAVENOUS at 07:05

## 2022-05-09 RX ADMIN — FENTANYL CITRATE 100 MCG: 50 INJECTION, SOLUTION INTRAMUSCULAR; INTRAVENOUS at 07:05

## 2022-05-09 RX ADMIN — CEFAZOLIN 2 G: 330 INJECTION, POWDER, FOR SOLUTION INTRAMUSCULAR; INTRAVENOUS at 07:05

## 2022-05-09 RX ADMIN — SUGAMMADEX 200 MG: 100 INJECTION, SOLUTION INTRAVENOUS at 08:05

## 2022-05-09 RX ADMIN — EPHEDRINE SULFATE 20 MG: 50 INJECTION INTRAVENOUS at 07:05

## 2022-05-09 RX ADMIN — SODIUM CHLORIDE, SODIUM LACTATE, POTASSIUM CHLORIDE, AND CALCIUM CHLORIDE: .6; .31; .03; .02 INJECTION, SOLUTION INTRAVENOUS at 06:05

## 2022-05-09 RX ADMIN — PROPOFOL 150 MG: 10 INJECTION, EMULSION INTRAVENOUS at 07:05

## 2022-05-09 RX ADMIN — ONDANSETRON HYDROCHLORIDE 4 MG: 2 INJECTION INTRAMUSCULAR; INTRAVENOUS at 08:05

## 2022-05-09 RX ADMIN — PROPOFOL 50 MG: 10 INJECTION, EMULSION INTRAVENOUS at 07:05

## 2022-05-09 NOTE — OP NOTE
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 female brought to the operating room for definitive surgery of invasive ductal carcinoma of the right breast.  The patient has elected to undergo right simple mastectomy with sentinel lymph node biopsy for jose l assessment. The patient was informed of the possible risks and complications of the procedure, including but not limited to anesthetic risks, bleeding, infection, and need for additional surgery.  The patient concurred with the proposed plan, and has given informed consent.  The site of surgery was properly noted/marked in the preoperative holding area.    The patient was then brought to the operating room and placed in the supine position with both upper extremities extended.  general anesthesia was administered. Perioperative antibiotics were administered consisting of Ancef and a time out was performed confirming the patient, site, and procedure.  The right breast was injected with technetium sulfur colloid and lymphazurin blue dyeThe right chest and axilla was then prepped and draped in the usual sterile fashion.    We first turned our attention to the right breast where an elliptical incision was extended from the axillary incision to incorporate the nipple areolar complex.  The incision was  made with a 15-blade and deepened through the subcutaneous tissues with Bovie electrocautery.  Skin flaps were raised to the clavicle superiorly, to the lateral border of the sternum medially, to the inframammary fold inferiorly, and to the anterior border of the latissimus dorsi muscle laterally. The breast tissue was sharply excised off the chest wall taking care to incorporate the pectoralis fascia while leaving the serratus fascia behind.  The resulting mastectomy specimen was marked using a short stitch superiorly and a long stitch laterally.  Intraoperative imaging confirmed removal of the clip and lesion of interest. The breast was sent to pathology for permanent evaluation.   The gamma probe was used to identify an area of increased radioactivity within the lower axilla. The clavipectoral sheath was sharply incised to reveal the level I axillary lymph nodes. The probe was used to identify a single node with increased radioactivity.  This node was brought into the operative field and carefully dissected free of the surrounding lymphovascular structures.  The highest ex vivo count of the node was 2004.  The node was then sent to pathology for frozen section evaluation, labeled as sentinel node #1.  A total of 2 axillary sentinel nodes and 0 axillary non-sentinel nodes were identified, excised and submitted to pathology.  Bed counts were obtained to confirm that the 10% rule had not been violated.  The operative field was irrigated with normal saline and all bleeding points were secured with Bovie electrocautery.    Experal was injected. A 15 Fr jie drain was placed under the mastectomy flap. The incision was closed using an interrupted 3-0 vicryl deep dermal stitch followed by a running 4-0 monocryl subcuticulare.      Dermabond was applied. A post surgical bra was placed on the patient. At the end of the operation, all sponge, instrument, and needle counts x 2 were correct.    ESTIMATED BLOOD LOSS:  Minimal    COMPLICATIONS: none    DISPOSITION: PACU - hemodynamically stable.    ATTESTATION:   I was present and scrubbed for the entire procedure.     All Synoptic reporting:    Operation performed with curative intent: yes    Tracer used to identify sentinel nodes in the upfront surgery (non-neoadjuvant) setting: radioactive tracer,     All nodes (colored or non-colored) present at the end of a dye-filled lymphatic channel were removed: N/A    All significantly radioactive nodes were removed: Yes    All palpably suspicious nodes were removed: N/A, no palpable nodes    Biopsy-proven positive nodes marked with clips prior to chemotherapy were identified and removed: N/A

## 2022-05-09 NOTE — ANESTHESIA PROCEDURE NOTES
Intubation    Date/Time: 5/9/2022 7:06 AM  Performed by: Patty Lake CRNA  Authorized by: Oj Mustafa MD     Intubation:     Induction:  Intravenous    Intubated:  Postinduction    Mask Ventilation:  Easy mask    Attempts:  1    Attempted By:  CRNA    Method of Intubation:  Direct and video laryngoscopy    Blade:  Gregory 3    Laryngeal View Grade: Grade I - full view of cords      Difficult Airway Encountered?: No      Complications:  None    Airway Device:  Oral endotracheal tube    Airway Device Size:  7.0    Style/Cuff Inflation:  Cuffed    Inflation Amount (mL):  3    Tube secured:  20    Secured at:  The lips    Placement Verified By:  Capnometry    Complicating Factors:  None    Findings Post-Intubation:  BS equal bilateral

## 2022-05-09 NOTE — BRIEF OP NOTE
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  Clip identified in specimen on radioimaging intraoperatively.  x2 sentinel lymph nodes obtained.  See op note for details.    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 Right mastectomy-short stitch superior, long stitch lateral2. Right axillary sentinel lymph node #1- hot & blue, count 42045. Right axillary sentinel lymph node #2- hot & blue, count 1700     References:    Click here for ordering Quick Tip   Question Answer Comment   Procedure Type: Breast    Which provider would you like to cc? NEAL DHILLON    Release to patient Immediate        05/09/22 0823                  Discharge Note    OUTCOME: Patient tolerated treatment/procedure well without complication and is now ready for discharge.    DISPOSITION: Home or Self Care    FINAL DIAGNOSIS:  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,  estrogen receptor positive    FOLLOWUP: In clinic    Follow-up Information     Eve Dhillon MD Follow up in 2 week(s).    Specialties: Surgery, Breast Surgery  Why: For wound re-check, post op follow up.  Contact information:  [address]  [place] [address] [phone]                          Medication List      START taking these medications    oxyCODONE 5 MG immediate release tablet  Commonly known as: ROXICODONE  Take 1 tablet (5 mg total) by mouth every 4 (four) hours as needed for Pain.        CONTINUE taking these medications    albuterol 90 mcg/actuation inhaler  Commonly known as: PROAIR HFA  2 puffs qid prn     amLODIPine 10 MG tablet  Commonly known as: NORVASC  Take 1 tablet (10 mg total) by mouth once daily.     ascorbic acid (vitamin C) 500 MG tablet  Commonly known as: VITAMIN C     aspirin 81 MG EC tablet  Commonly known as: ECOTRIN     atorvastatin 80 MG tablet  Commonly known as: LIPITOR  TAKE 1 TABLET(80 MG) BY MOUTH EVERY DAY     azelastine 137 mcg (0.1 %) nasal spray  Commonly known as: ASTELIN  1 spray (137 mcg total) by Nasal route 2 (two) times daily.     benztropine 0.5 MG tablet  Commonly known as: COGENTIN  Take 1 tablet (0.5 mg total) by mouth 2 (two) times daily.     cyanocobalamin 500 MCG tablet     fish oil-omega-3 fatty acids 300-1,000 mg capsule     fluticasone propionate 50 mcg/actuation nasal spray  Commonly known as: FLONASE  SHAKE LIQUID WELL AND USE 1 SPRAY IN EACH NOSTRIL EVERY DAY     folic acid 1 MG tablet  Commonly known as: FOLVITE  Take 1 tablet (1 mg total) by mouth once daily.     gabapentin 300 MG capsule  Commonly known as: NEURONTIN  Take 1 capsule (300 mg total) by mouth every evening.     isosorbide mononitrate 30 MG 24 hr tablet  Commonly known as: IMDUR  TAKE 1 TABLET(30 MG) BY MOUTH EVERY DAY     lisinopriL 5 MG tablet  Commonly known as: PRINIVIL,ZESTRIL  Take 2 tablets (10 mg total) by mouth once daily.     LUTEIN ORAL     methotrexate 2.5 MG  Tab  Take 4 tablets (10 mg total) by mouth every 7 days.     multivitamin per tablet  Commonly known as: THERAGRAN     * risperiDONE 2 MG tablet  Commonly known as: RISPERDAL  Take 1 tablet (2 mg total) by mouth every morning.     * risperiDONE 4 MG tablet  Commonly known as: RISPERDAL  TAKE 1 TABLET(4 MG) BY MOUTH EVERY EVENING     venlafaxine 75 MG 24 hr capsule  Commonly known as: EFFEXOR-XR  TAKE 1 CAPSULE(75 MG) BY MOUTH EVERY DAY     VITAMIN A ORAL     vitamin D 1000 units Tab  Commonly known as: VITAMIN D3     vitamin E 200 UNIT capsule     XELJANZ ORAL     zinc gluconate 50 mg tablet         * This list has 2 medication(s) that are the same as other medications prescribed for you. Read the directions carefully, and ask your doctor or other care provider to review them with you.               Where to Get Your Medications      These medications were sent to [facility]  [address] [address]    Hours: Mon-Fri, 8a-5:30p Phone: [phone]   · oxyCODONE 5 MG immediate release tablet       DISCHARGE INSTRUCTIONS:    Discharge Procedure Orders   Diet Adult Regular     Lifting restrictions   Order Comments: Don't lift more than 10lbs for at least 2 weeks.     No driving until:   Order Comments: No driving while using narcotics.     Remove dressing in 24 hours   Order Comments: Please keep surgical bra on at all times during day and night.  May remove to shower however, please replace once done.  Can replace surgical bra for a tight fitting sports bra as needed for comfort.     Notify your health care provider if you experience any of the following:  temperature >100.4     Notify your health care provider if you experience any of the following:  persistent nausea and vomiting or diarrhea     Notify your health care provider if you experience any of the following:  severe uncontrolled pain     Notify your health care provider if you experience any of the following:  redness,  tenderness, or signs of infection (pain, swelling, redness, odor or green/yellow discharge around incision site)     Notify your health care provider if you experience any of the following:  difficulty breathing or increased cough     Notify your health care provider if you experience any of the following:  severe persistent headache     Activity as tolerated     Shower on day dressing removed (No bath)   Order Comments: May shower tomorrow.  Don't soak in a bath, pool, lake, etc for at least 2 weeks.

## 2022-05-09 NOTE — DISCHARGE INSTRUCTIONS
Your Surgeon Gave You EXPAREL® (bupivacaine liposome injectable suspension)    To help control your pain after surgery, your surgeon injected EXPAREL into your surgical incision just before the end of the procedure.   EXPAREL is a local analgesic that contains the local anesthetic bupivacaine. Local anesthetics provide pain relief by numbing the tissue  around the surgical site.   EXPAREL is specifically designed to release pain medication over time and can control pain for up to 72 hours.   In addition to EXPAREL, your surgeon may provide other pain medications to control your pain.   Each patient is different and responds differently to painmedication. Depending on how you respond to EXPAREL, you may require less  additional pain medication during your recovery.    Possible Side Effects    Side effects can occur with any medication and it is important not to ignore anything you may be experiencing. Some patients who  received EXPAREL experienced nausea, vomiting, or constipation. Rarely, patients who receive bupivacaine (the active ingredient in  EXPAREL) have experienced numbness and tingling in their mouth or lips, lightheadedness, or anxiety. Speak with your doctor right  away if you think you may be experiencing any of these sensations, or if you have other questions regarding possible side effects.    Your Recovery    When your pain is under control, your body can better focus on healing. This is not the time to test your pain tolerance, or grin and  bear it.Work with your surgeon and nurse to make your recovery as speedy and pain-free as possible.   Follow the post-op orders your nurse gave you.   Eat a healthy diet and drink plenty of water. Surgery stresses your body; your body responds by needing more energy to heal.      Important Information About EXPAREL  Products that contain bupivacaine, like EXPAREL, may cause a temporary loss of  sensation or the ability to move in the area where bupivacaine  was injected.    Date Administered: 5/9/2022  Time Administered: 08:24 AM         Anesthesia: After Your Surgery  Youve just had surgery. During surgery, you received medication called anesthesia to keep you comfortable and pain-free. After surgery, you may experience some pain or nausea. This is common. Here are some tips for feeling better and recovering after surgery.    Going home  Your doctor or nurse will show you how to take care of yourself when you go home. He or she will also answer your questions. Have an adult family member or friend drive you home. For the first 24 hours after your surgery:  Do not drive or use heavy equipment.  Do not make important decisions or sign legal documents.  Avoid alcohol.  Have someone stay with you, if needed. He or she can watch for problems and help keep you safe.  Take your time getting up from a seated or lying position. You may experience dizziness for 24 hours  Be sure to keep all follow-up appointments with your doctor. And rest after your procedure for as long as your doctor tells you to.    Coping with pain  If you have pain after surgery, pain medication will help you feel better. Take it as directed, before pain becomes severe. Also, ask your doctor or pharmacist about other ways to control pain, such as with heat, ice, and relaxation. And follow any other instructions your surgeon or nurse gives you.    URINARY RETENTION  Should you experience a decrease in your urine output or are unable to urinate following surgery, this can be due to the medications given during surgery.  We recommend you going to the nearest Emergency Department.    Tips for taking pain medication  To get the best relief possible, remember these points:  Pain medications can upset your stomach. Taking them with a little food may help.  Most pain relievers taken by mouth need at least 20 to 30 minutes to take effect.  Taking medication on a schedule can help you remember to take it. Try to  time your medication so that you can take it before beginning an activity, such as dressing, walking, or sitting down for dinner.  Constipation is a common side effect of pain medications. Contact your doctor before taking any medications like laxatives or stool softeners to help relieve constipation. Also ask about any dietary restrictions, because drinking lots of fluids and eating foods like fruits and vegetables that are high in fiber can also help. Remember, dont take laxatives unless your surgeon has prescribed them.  Mixing alcohol and pain medication can cause dizziness and slow your breathing. It can even be fatal. Dont drink alcohol while taking pain medication.  Pain medication can slow your reflexes. Dont drive or operate machinery while taking pain medication.  If your health care provider tells you to take acetaminophen to help relieve your pain, ask him or her how much you are supposed to take each day. (Acetaminophen is the generic name for Tylenol and other brand-name pain relievers.) Acetaminophen or other pain relievers may interact with your prescription medicines or other over-the-counter (OTC) drugs. Some prescription medications contain acetaminophen along with other active ingredients. Using both prescription and OTC acetaminophen for pain can cause you to overdose. The FDA recommends that you read the labels on your OTC medications carefully. This will help you to clearly understand the list of active ingredients, dosing instructions, and any warnings. It may also help you avoid taking too much acetaminophen. If you have questions or don't understand the information, ask your pharmacist or health care provider to explain it to you before you take the OTC medication.    Managing nausea  Some people have an upset stomach after surgery. This is often due to anesthesia, pain, pain medications, or the stress of surgery. The following tips will help you manage nausea and get good nutrition as  you recover. If you were on a special diet before surgery, ask your doctor if you should follow it during recovery. These tips may help:  Dont push yourself to eat. Your body will tell you when to eat and how much.  Start off with clear liquids and soup. They are easier to digest.  Progress to semi-solid foods (mashed potatoes, applesauce, and gelatin) as you feel ready.  Slowly move to solid foods. Dont eat fatty, rich, or spicy foods at first.  Dont force yourself to have three large meals a day. Instead, eat smaller amounts more often.  Take pain medications with a small amount of solid food, such as crackers or toast to avoid nausea.      Call your surgeon if    You feel too sleepy, dizzy, or groggy (medication may be too strong).  You have side effects like nausea, vomiting, or skin changes (rash, itching, or hives).   © 7361-3634 [employer]. [address] [place] [address], [place] [address]. All rights reserved. This information is not intended as a substitute for professional medical care. Always follow your healthcare professional's instructions.                       Other Forms of Bupivicaine should not be administered within 96hrs following administration of EXPAREL.

## 2022-05-09 NOTE — ANESTHESIA POSTPROCEDURE EVALUATION
"Anesthesia Post Evaluation    Patient: Bhavna Landry    Procedure(s) Performed: Procedure(s) (LRB):  MASTECTOMY-Right (Right)  BIOPSY, LYMPH NODE, SENTINEL-Right (Right)  INJECTION, FOR SENTINEL NODE IDENTIFICATION-Right (Right)    Final Anesthesia Type: general      Patient location during evaluation: PACU  Patient participation: Yes- Able to Participate  Level of consciousness: awake and alert  Post-procedure vital signs: reviewed and stable  Pain management: adequate  Airway patency: patent    PONV status at discharge: No PONV  Anesthetic complications: no      Cardiovascular status: blood pressure returned to baseline  Respiratory status: unassisted, spontaneous ventilation and room air  Hydration status: euvolemic  Follow-up not needed.          Vitals Value Taken Time   /60 05/09/22 0955   Temp 36.7 °C (98 °F) 05/09/22 0945   Pulse 93 05/09/22 0955   Resp 18 05/09/22 0955   SpO2 95 % 05/09/22 0955         Event Time   Out of Recovery 09:48:42         Pain/Azra Score: Pain Rating Prior to Med Admin: 9 ("tolerable") (5/9/2022  9:39 AM)  Azra Score: 10 (5/9/2022  9:42 AM)        "

## 2022-05-09 NOTE — TRANSFER OF CARE
"Anesthesia Transfer of Care Note    Patient: Bhavna Landry    Procedure(s) Performed: Procedure(s) (LRB):  MASTECTOMY-Right (Right)  BIOPSY, LYMPH NODE, SENTINEL-Right (Right)  INJECTION, FOR SENTINEL NODE IDENTIFICATION-Right (Right)    Anesthesia PACU Handoff    Last vitals:   Visit Vitals  /66 (BP Location: Left arm, Patient Position: Lying)   Pulse 70   Temp 36.6 °C (97.8 °F) (Oral)   Resp 16   Ht 5' 2" (1.575 m)   Wt 46.7 kg (103 lb)   SpO2 95%   Breastfeeding No   BMI 18.84 kg/m²     "

## 2022-05-09 NOTE — PLAN OF CARE
Bhavna MEJÍA Gris has met all discharge criteria from Phase II. Vital Signs are stable, ambulating  without difficulty. Discharge instructions given, drain teaching completed, patient and daughter verbalized understanding. Discharged from facility via wheelchair in stable condition.

## 2022-05-09 NOTE — PATIENT INSTRUCTIONS
POSTOPERATIVE INSTRUCTIONS FOLLOWING   MASTECTOMY AND/OR AXILLARY LYMPH NODE DISSECTION    The following are post-operative instructions that will help you to recover from your surgery.  Please read over these instructions carefully and contact us if we can answer any of your questions or concerns.    Dressing/breast binder (surgi-bra)  A surgical bra may be placed around your chest after your surgery.  If you are given the bra, please wear it as close to 24 hours a day as possible until your post-operative clinic appointment.  If the elastic around the bra irritates your skin, you may wear a soft t-shirt underneath the bra.    You may go without wearing the bra long enough to bath, to launder and dry the bra. If you have fluffy filler placed inside the bra, the filler should be removed whenever the bra is taken off. Please reinsert the fluffy filler, or insert the new soft filler, under the bra when you put the bra back on.  If the bra is extremely uncomfortable, you may wear a supportive sports bra instead after 2 days.    You may shower AFTER the drains are removed.  Please sponge bathe until then. Do not take a tub bath and do not soak the surgical site. Please do not remove the glue that covers your incision.  It may flake off around 2-3 weeks after surgery and if not then it will be removed at your clinic visit.    Activity   You will be able to do much of your own personal care, such as bathing, dressing, preparing simple meals, etc.  A short walk each day will help with your recovery  You may find that you need to take rest breaks between activities, but you should not need to stay in bed for prolonged periods of time during the day  You may resume light household activities such as simple meal preparation, folding laundry, using your computer, and completing paperwork as you feel ready  Please avoid activities that require moderate to heavy lifting (grocery shopping) or pushing/pulling (vacuuming) and  repetitive motions (such as washing windows or long hours on the computer). Do not lift anything heavier than a gallon of milk.  A good rule during this time is to listen to your body, do what is comfortable, and stop and rest when your feel tired.  If it hurts, dont do it.  Following a lymph node dissection, dont avoid using your arm, but dont exercise your arm until after your first post-operative visit.  At your first post-op visit, you will be given arm exercises to regain movement and flexibility.  You may be referred to physical therapy.  You may restart driving when you are no longer on narcotics and you feel safe turning the wheel and stopping quickly.  You will need to be out of work approximately 2-6 weeks depending on your particular surgery and how well you are recovering.  We will evaluate how you are doing at the first post-op appointment.  This is a good time to ask when you may return to work and what activities you may do.    Medication for pain  You will be given a prescription for pain medication. You should not drive or operate machinery while taking these.  Please take narcotics with food.  Narcotics can cause, or worse, constipation.  You will need to increase your fluid intake, eat high fiber foods (such as fruits and bran) and make sure that you are up and walking. You may need to take an over the counter stool softener for constipation.  An icepack may be helpful to decrease discomfort and swelling, particularly to the armpit after a lymph node dissection.  A small pillow positioned in the armpit may also decrease discomfort after a lymph node dissection.    How to care for your Drain(s)  Wash hands--STRIP or milk the drainage tube as it comes out of your body toward the bulb.   Beginning where the drain comes out of your body, hold drainage tubing with one hand and with the other, stretch and release tubing an inch at time while moving downward with both hands toward the bulb.  Do this  2-3 times before emptying the bulb.  Remove the stopper from the bulbs port  (drainage port)  Pour the drainage in the measuring cup provided by the nurse  Flatten/squeeze the bulb to create a vacuum and replace the stopper before letting go the of the bulb.  Record the date, time and amount of drainage in ccs (not ounces) each time bulb is emptied. If you have more than one drain, record each separately.  Discard the drainage into the toilet after measuring and then wash hands.  Empty bulbs as needed but at least once daily.   Remember to bring the output record with you to your doctors appointment.    Please report the following:  Temperature greater than 101 degrees  Discharge or bad odor from the wound  Excessive bleeding, such as bloody dressing or extreme bruising  Redness at incision and/or drain sites  Swelling or buildup of fluid around incision  If blue dye was used to locate your sentinel lymph nodes, your urine and stool may be blue-green in color for 1 or 2 days.    Additional information  I will see you approximately 2 weeks following your surgery.  If this follow-up appointment has not been made, please call the office.    If you have any questions or problems, please call my office or my nurse.  Dr. Eve Dhillon MD  If you have questions, please call my nurse: Cyn at [phone] or [place] at [phone]    After hours and on weekends, you may call the main [facility] line at [phone] and ask to have the general surgery resident paged or have me paged      Lymphedema Risk Reduction    Lymphedema is a swelling of a part of the body, caused by an insufficient lymphatic system and an accumulation of fluid in the bodys tissues.  Lymphedema may occur when normal drainage of fluid is disrupted, such as an infection, injury, cancer, scar tissue, or removal of lymph nodes.    If you had a full axillary node dissection procedure, you may be at great risk for lymphedema.     For those patients  having a sentinel lymph node biopsy, these risks may be smaller and the recommendations are provided for your review and consideration.    The following list contains recommendations for reducing your risk of developing lymphedema.    Skin Care--avoid trauma/injury to reduce infection risk  Keep the hand and arm on the side of surgery clean and dry  Pay attention to nail care and do not cut cuticles  Avoid punctures, such as injections and blood draws from you on the side of your surgery.   Wear gloves while doing activities that may cause skin injury (washing dishes, gardening, etc.)  If scratches or punctures occur, wash area with soap and water, and observe for signs of infections (redness, drainage, swelling)  If a rash, itching, redness, pain, increased skin temperatures, fever, or flu-like symptoms occur, contact your physician immediately for early treatment of a possible infection  Activity/Lifestyle  Gradually build up the duration and intensity of any activity or exercise  Take frequent rest periods during activity to allow for arm recovery  Monitor your arm and upper body during and after activity for any change in size, shape, tissue, texture, soreness, heaviness, or firmness                  PLEASE RECORD ALL AMOUNTS IN CCS AND BRIND THIS RECORD   WITH YOU TO YOUR RETURN APPOINTMENT  Date Time Drain #1 Drain #2 comment                                                                                                                                                                                                                         Date Time Drain #1 Drain #2 comment

## 2022-05-11 ENCOUNTER — TELEPHONE (OUTPATIENT)
Dept: SURGERY | Facility: CLINIC | Age: 67
End: 2022-05-11
Payer: MEDICARE

## 2022-05-11 ENCOUNTER — TELEPHONE (OUTPATIENT)
Dept: INTERNAL MEDICINE | Facility: CLINIC | Age: 67
End: 2022-05-11
Payer: MEDICARE

## 2022-05-11 NOTE — TELEPHONE ENCOUNTER
Called and spoke to pt and advised her that Dr Vargas is not in the office this week   She sd she is not feeling well has sore throat and sinus issue   Can not come in due to having a mastectomy on Monday   I did  Tell her that she will need to be seen advised her to contact her surgeon

## 2022-05-11 NOTE — TELEPHONE ENCOUNTER
----- Message from Peggy Padgett sent at 5/11/2022  9:24 AM CDT -----  Contact: [phone]  .1 Patient would like to get medical advice.  Symptoms (please be specific): patient had a Mastectomy Monday, soar throat, sinus drip and cough.   How long has patient had these symptoms: Monday 05/09/22  Pharmacy name and phone#:[id number] - [place] - [address] AT [facility]   Phone:  [phone]  Fax:  [phone]  Any drug allergies: on file  Comments: Patient would like to get medical advice. Patient had a Mastectomy Monday, soar throat, sinus drip and cough. Would like to be call something to the pharmacy. Please call and advise. Thank you.

## 2022-05-11 NOTE — TELEPHONE ENCOUNTER
"Spoke with patient regarding symptoms. Pt states she is coughing and complains of sinus drip to back of throat. Otherwise, asymptomatic from surgery. Incision site CDI, pt is ambulatory. Patient states "I do not feel like going into the doctor's office and being around other people". Advised patient to reach out to her PCP for symptom management regarding sinus drip/cough. Any concerns related to her breast she will call our office back.   "

## 2022-05-16 ENCOUNTER — TELEPHONE (OUTPATIENT)
Dept: SURGERY | Facility: CLINIC | Age: 67
End: 2022-05-16
Payer: MEDICARE

## 2022-05-16 ENCOUNTER — TELEPHONE (OUTPATIENT)
Dept: FAMILY MEDICINE | Facility: CLINIC | Age: 67
End: 2022-05-16
Payer: MEDICARE

## 2022-05-16 RX ORDER — OXYCODONE HYDROCHLORIDE 5 MG/1
5 TABLET ORAL EVERY 6 HOURS PRN
Qty: 20 TABLET | Refills: 0 | Status: SHIPPED | OUTPATIENT
Start: 2022-05-16 | End: 2022-05-17 | Stop reason: SDUPTHER

## 2022-05-16 NOTE — TELEPHONE ENCOUNTER
----- Message from Cyn Tran RN sent at 5/16/2022  7:11 AM CDT -----  Contact: self @ [phone]    ----- Message -----  From: Lorena Wen  Sent: 5/13/2022   1:36 PM CDT  To: Eve Dhillon Staff    Pt is requesting a refill for oxyCODONE (ROXICODONE) 5 MG immediate release tablet.       [id number] - [place] - [address] AT [facility] [address]   Phone:  [phone]  Fax:  [phone]

## 2022-05-16 NOTE — TELEPHONE ENCOUNTER
----- Message from Teresa Childress sent at 5/16/2022  2:01 PM CDT -----  Pt called to [facility] did no receive oxyCODONE (ROXICODONE) 5 MG immediate release tablet      [id number] - [place]  [address] [facility] [address] [address]  Phone: [phone] Fax: [phone]

## 2022-05-16 NOTE — TELEPHONE ENCOUNTER
Left VM with my direct contact information, patient notified that Dr. Dhillon refilled her prescription for pain medicine as requested.

## 2022-05-17 ENCOUNTER — TELEPHONE (OUTPATIENT)
Dept: SURGERY | Facility: CLINIC | Age: 67
End: 2022-05-17
Payer: MEDICARE

## 2022-05-17 LAB — INTEGRATED BRAC ANALYSIS: NORMAL

## 2022-05-17 RX ORDER — OXYCODONE HYDROCHLORIDE 5 MG/1
5 TABLET ORAL EVERY 6 HOURS PRN
Qty: 16 TABLET | Refills: 0 | Status: SHIPPED | OUTPATIENT
Start: 2022-05-17 | End: 2022-05-21

## 2022-05-17 NOTE — TELEPHONE ENCOUNTER
Call pharmacy and confirmed that oxycodone prescription was not picked up and was cancelled due to the medication being unavailable. New prescription sent to [facility] pharmacy.

## 2022-05-23 ENCOUNTER — LAB VISIT (OUTPATIENT)
Dept: LAB | Facility: HOSPITAL | Age: 67
End: 2022-05-23
Attending: INTERNAL MEDICINE
Payer: MEDICARE

## 2022-05-23 DIAGNOSIS — E78.5 HYPERLIPIDEMIA, UNSPECIFIED HYPERLIPIDEMIA TYPE: ICD-10-CM

## 2022-05-23 LAB
CHOLEST SERPL-MCNC: 169 MG/DL (ref 120–199)
CHOLEST/HDLC SERPL: 1.5 {RATIO} (ref 2–5)
HDLC SERPL-MCNC: 110 MG/DL (ref 40–75)
HDLC SERPL: 65.1 % (ref 20–50)
LDLC SERPL CALC-MCNC: 48.4 MG/DL (ref 63–159)
NONHDLC SERPL-MCNC: 59 MG/DL
TRIGL SERPL-MCNC: 53 MG/DL (ref 30–150)

## 2022-05-23 PROCEDURE — 80061 LIPID PANEL: CPT | Performed by: INTERNAL MEDICINE

## 2022-05-23 PROCEDURE — 36415 COLL VENOUS BLD VENIPUNCTURE: CPT | Mod: PO | Performed by: INTERNAL MEDICINE

## 2022-05-23 NOTE — PROGRESS NOTES
"    [facility]       Post-Op        REFERRING PHYSICIAN:  No referring provider defined for this encounter.       Sadaf Vargas MD    MEDICAL ONCOLOGIST:    pending  RADIATION ONCOLOGIST:   na    DIAGNOSIS:    This is a 66 y.o. female with a stage pT1b N0 M0 grade 2 ER + HI - HER2 - IDC of the right breast.    TREATMENT SUMMARY:  The patient is status post right  mastectomy and sentinel node biopsy on 5/9/2022.  Final pathology showed   1. Breast, right, mastectomy:   - Invasive ductal carcinoma       - Greatest dimension:  8 mm       - Jonathan-Alaniz modified SBR score 3 + 2 + 1 = 6 of 9       - Histologic grade 2 of 3       - Mitotic index = 0.2 (average mitoses per high power field) (low)       - Resection margin free of invasive carcinoma           - Invasive carcinoma present 9 mm to closest posterior margin, 26 mm   to inferior margin, and 45 mm to superior margin   - Biopsy clip present   - No lymphovascular invasion is identified   - Ductal carcinoma in situ       - Foci up to 8 mm in greatest dimension spanning a distance of   approximately 20 mm       - Nuclear grade ranges from 2 to 3 of 3       - Solid and cribriform patterns       - Central comedonecrosis present:  Approximately 80%       - Resection margin free of DCIS           - DCIS present present 9 mm to closest posterior margin, 26 mm to   inferior margin, and 45 mm to superior margin   - Background breast contains usual ductal hyperplasia, apocrine metaplasia,   microcysts and macrocysts, ectatic ducts, and stromal fibrosis   - Microcalcifications, calcium phosphate type, associated with DCIS and   focally with medial calcific arterial stenosis (Monckeberg's sclerosis)   - See CAP Tumor Synoptic   2. [place] lymph node, right axillary, "#1 hot and blue count 2004,"   excision:   - 1 lymph node, isolated tumor cells present   3. [place] lymph node, right axillary, "#2 hot and blue count 1700,"   excision:   - 1 lymph node, negative " for metastatic carcinoma   CAP Tumor Synoptic:   Procedure:  Total mastectomy   Specimen laterality:  Right   Tumor site:  9 o'clock   Histologic type:  Invasive carcinoma of no special type (ductal)   Histologic grade (Shelly histologic score):  Shelly score 3 + 2 + 1 =   6 of 9   Glandular (acinar) / tubular differentiation:  Score 3   Nuclear pleomorphism:  Score 2   Mitotic grade:  Score 1   Overall grade:  Grade 2   Tumor size:  8 mm   Tumor focality:  Single focus of invasive carcinoma   Ductal carcinoma in situ (DCIS):  Present (Negative for extensive intraductal   component)       Size (extent) of DCIS:  Foci up to 8 mm in greatest dimension spanning a   distance of approximately 20 mm       Number blocks with DCIS:  4       Number blocks examined:  13       Architectural patterns:  Comedo, cribriform, solid       Nuclear grade:  Ranges from grade 2 (intermediate) to grade 3 (high)   Lobular carcinoma in situ (LCIS):  Not identified   Tumor extent:  Not applicable   Lymphovascular invasion:  Not identified   Dermal lymphovascular invasion:  Not identified   Microcalcifications:  Present in DCIS and nonneoplastic tissue   Treatment effect in the breast:  No known pre-surgical therapy   Treatment effect in the lymph nodes:  Not applicable   Margin status for invasive carcinoma:  All margins negative for invasive   carcinoma       Distance from invasive carcinoma closest posterior margin:  9 mm       Distance from invasive carcinoma to inferior margin:  26 mm       Distance from invasive carcinoma to superior margin:  45 mm   Margin status for DCIS:  All margins negative for DCIS       Distant from DCIS to closest posterior margin:  9 mm       Distance from DCIS to inferior margin:  26 mm       Distance from DCIS to superior margin:  45 mm   Regional lymph node status:  Tumor present in regional lymph nodes   Number of lymph nodes with macrometastases:  0   Number of lymph nodes with micrometastases:  0    Number of lymph nodes with isolated tumor cells:  1   Size of largest jose l metastatic deposit:  0.13 mm   Extranodal extension:  Not identified   Total number of lymph nodes examined (sentinel and nonsentinel):  2   Number of sentinel nodes examined:  2   Distant sites involved:  Not applicable   TNM descriptors:  Not applicable   pT category:  pT1b   Regional lymph nodes modifier:  (sn)   pN category:  (sn) pN0 (i+)   pM category:  Not applicable - pM cannot be determined from the submitted   specimens   Special studies:  Biomarkers, assessed by immunohistochemistry on prior right   breast biopsy ([facility]-) with following reported results:       - Estrogen Receptor (ER):  Positive (100%; strong intensity)       - Progesterone Receptor (NH):  Negative (<1%)       - HER2:  Negative (1+)       - Ki-67 proliferation index:  12% (low)     INTERVAL HISTORY:   Bhavna Landry comes in for a post-op check. Her pain is well controlled.  Drain output has been less than 30 cc for the last few days      MEDICATIONS:  Current Outpatient Medications   Medication Sig Dispense Refill    albuterol (PROAIR HFA) 90 mcg/actuation inhaler 2 puffs qid prn 54 g 3    amLODIPine (NORVASC) 10 MG tablet Take 1 tablet (10 mg total) by mouth once daily. 30 tablet 11    ascorbic acid, vitamin C, (VITAMIN C) 500 MG tablet Take 500 mg by mouth once daily.      aspirin (ECOTRIN) 81 MG EC tablet Take 81 mg by mouth once daily.      atorvastatin (LIPITOR) 80 MG tablet TAKE 1 TABLET(80 MG) BY MOUTH EVERY DAY 90 tablet 3    azelastine (ASTELIN) 137 mcg (0.1 %) nasal spray 1 spray (137 mcg total) by Nasal route 2 (two) times daily. 30 mL 0    benztropine (COGENTIN) 0.5 MG tablet Take 1 tablet (0.5 mg total) by mouth 2 (two) times daily. 180 tablet 3    cyanocobalamin 500 MCG tablet Take 500 mcg by mouth once daily.      fluticasone propionate (FLONASE) 50 mcg/actuation nasal spray SHAKE LIQUID WELL AND USE 1 SPRAY IN EACH NOSTRIL EVERY  DAY 48 g 0    folic acid (FOLVITE) 1 MG tablet Take 1 tablet (1 mg total) by mouth once daily. 90 tablet 3    gabapentin (NEURONTIN) 300 MG capsule Take 1 capsule (300 mg total) by mouth every evening. 90 capsule 3    isosorbide mononitrate (IMDUR) 30 MG 24 hr tablet TAKE 1 TABLET(30 MG) BY MOUTH EVERY DAY 30 tablet 11    lisinopriL (PRINIVIL,ZESTRIL) 5 MG tablet Take 2 tablets (10 mg total) by mouth once daily. 180 tablet 0    LUTEIN ORAL Take by mouth.      methotrexate 2.5 MG Tab Take 4 tablets (10 mg total) by mouth every 7 days. 16 tablet 0    multivitamin (THERAGRAN) per tablet Take 1 tablet by mouth once daily.      omega-3 fatty acids/fish oil (FISH OIL-OMEGA-3 FATTY ACIDS) 300-1,000 mg capsule Take by mouth once daily.      risperiDONE (RISPERDAL) 2 MG tablet Take 1 tablet (2 mg total) by mouth every morning. 90 tablet 3    risperiDONE (RISPERDAL) 4 MG tablet TAKE 1 TABLET(4 MG) BY MOUTH EVERY EVENING 90 tablet 3    tofacitinib citrate (XELJANZ ORAL) Take by mouth.      venlafaxine (EFFEXOR-XR) 75 MG 24 hr capsule TAKE 1 CAPSULE(75 MG) BY MOUTH EVERY DAY 30 capsule 11    VITAMIN A ORAL Take by mouth.      vitamin D (VITAMIN D3) 1000 units Tab Take 1,000 Units by mouth once daily.      vitamin E 200 UNIT capsule Take 200 Units by mouth once daily.      zinc gluconate 50 mg tablet Take 50 mg by mouth once daily.       No current facility-administered medications for this visit.     Facility-Administered Medications Ordered in Other Visits   Medication Dose Route Frequency Provider Last Rate Last Admin    0.9%  NaCl infusion  500 mL Intravenous Continuous Michelle Pineda Jr., MD        0.9%  NaCl infusion  500 mL Intravenous Continuous Michelle Pineda Jr., MD        lidocaine (PF) 10 mg/ml (1%) injection 10 mg  1 mL Intradermal Once Michelle Pienda Jr., MD        phenylephrine HCL 2.5% ophthalmic solution 1 drop  1 drop Right Eye On Call Procedure Karen Song MD   1 drop at  08/01/19 0648    proparacaine 0.5 % ophthalmic solution 1 drop  1 drop Right Eye On Call Procedure Karen Song MD   1 drop at 08/01/19 0638    tropicamide 1% ophthalmic solution 1 drop  1 drop Right Eye On Call Procedure Karen Song MD   1 drop at 08/01/19 0648       ALLERGIES:   Review of patient's allergies indicates:   Allergen Reactions    Etanercept Other (See Comments)     Other reaction(s): recurrent infections    Chloramphenicol sod succinate Hives    Codeine Other (See Comments)     Other reaction(s): Stomach upset. Pt states OK with Percocet    Nickel sutures [surgical stainless steel] Dermatitis     Allergic contact dermatitis    Adhesive Rash       PHYSICAL EXAMINATION:   General:  This is a well appearing female with appropriate speech, affect and gait.     Breast:  Incision clean, dry, and intact.  Drain minimal serous output removed today at bedside with no issues.    IMPRESSION:   The patient has had an uneventful postoperative course.    PLAN:   1. return in 6 months for a follow up office visit and breast exam  2. left mammogram in March.  3. The patient is advised in continued exam of the breast chest wall and to report to this office sooner should she note any areas of abnormality or concern.   4.  She has been instructed to meet with medical oncology for discussion of adjuvant treatment recommendations

## 2022-05-24 ENCOUNTER — PATIENT MESSAGE (OUTPATIENT)
Dept: SMOKING CESSATION | Facility: CLINIC | Age: 67
End: 2022-05-24
Payer: MEDICARE

## 2022-05-24 ENCOUNTER — OFFICE VISIT (OUTPATIENT)
Dept: SURGERY | Facility: CLINIC | Age: 67
End: 2022-05-24
Payer: MEDICARE

## 2022-05-24 VITALS
HEART RATE: 72 BPM | BODY MASS INDEX: 18.95 KG/M2 | HEIGHT: 62 IN | DIASTOLIC BLOOD PRESSURE: 57 MMHG | SYSTOLIC BLOOD PRESSURE: 112 MMHG | WEIGHT: 103 LBS

## 2022-05-24 DIAGNOSIS — Z12.31 SCREENING MAMMOGRAM FOR HIGH-RISK PATIENT: Primary | ICD-10-CM

## 2022-05-24 DIAGNOSIS — Z90.11 STATUS POST RIGHT MASTECTOMY: ICD-10-CM

## 2022-05-24 PROCEDURE — 3008F BODY MASS INDEX DOCD: CPT | Mod: CPTII,S$GLB,, | Performed by: SURGERY

## 2022-05-24 PROCEDURE — 1160F RVW MEDS BY RX/DR IN RCRD: CPT | Mod: CPTII,S$GLB,, | Performed by: SURGERY

## 2022-05-24 PROCEDURE — 3078F PR MOST RECENT DIASTOLIC BLOOD PRESSURE < 80 MM HG: ICD-10-PCS | Mod: CPTII,S$GLB,, | Performed by: SURGERY

## 2022-05-24 PROCEDURE — 99024 POSTOP FOLLOW-UP VISIT: CPT | Mod: S$GLB,,, | Performed by: SURGERY

## 2022-05-24 PROCEDURE — 1125F PR PAIN SEVERITY QUANTIFIED, PAIN PRESENT: ICD-10-PCS | Mod: CPTII,S$GLB,, | Performed by: SURGERY

## 2022-05-24 PROCEDURE — 3078F DIAST BP <80 MM HG: CPT | Mod: CPTII,S$GLB,, | Performed by: SURGERY

## 2022-05-24 PROCEDURE — 3288F FALL RISK ASSESSMENT DOCD: CPT | Mod: CPTII,S$GLB,, | Performed by: SURGERY

## 2022-05-24 PROCEDURE — 1159F PR MEDICATION LIST DOCUMENTED IN MEDICAL RECORD: ICD-10-PCS | Mod: CPTII,S$GLB,, | Performed by: SURGERY

## 2022-05-24 PROCEDURE — 1159F MED LIST DOCD IN RCRD: CPT | Mod: CPTII,S$GLB,, | Performed by: SURGERY

## 2022-05-24 PROCEDURE — 3008F PR BODY MASS INDEX (BMI) DOCUMENTED: ICD-10-PCS | Mod: CPTII,S$GLB,, | Performed by: SURGERY

## 2022-05-24 PROCEDURE — 1101F PR PT FALLS ASSESS DOC 0-1 FALLS W/OUT INJ PAST YR: ICD-10-PCS | Mod: CPTII,S$GLB,, | Performed by: SURGERY

## 2022-05-24 PROCEDURE — 4010F ACE/ARB THERAPY RXD/TAKEN: CPT | Mod: CPTII,S$GLB,, | Performed by: SURGERY

## 2022-05-24 PROCEDURE — 99024 PR POST-OP FOLLOW-UP VISIT: ICD-10-PCS | Mod: S$GLB,,, | Performed by: SURGERY

## 2022-05-24 PROCEDURE — 4010F PR ACE/ARB THEARPY RXD/TAKEN: ICD-10-PCS | Mod: CPTII,S$GLB,, | Performed by: SURGERY

## 2022-05-24 PROCEDURE — 3074F PR MOST RECENT SYSTOLIC BLOOD PRESSURE < 130 MM HG: ICD-10-PCS | Mod: CPTII,S$GLB,, | Performed by: SURGERY

## 2022-05-24 PROCEDURE — 1101F PT FALLS ASSESS-DOCD LE1/YR: CPT | Mod: CPTII,S$GLB,, | Performed by: SURGERY

## 2022-05-24 PROCEDURE — 3074F SYST BP LT 130 MM HG: CPT | Mod: CPTII,S$GLB,, | Performed by: SURGERY

## 2022-05-24 PROCEDURE — 1125F AMNT PAIN NOTED PAIN PRSNT: CPT | Mod: CPTII,S$GLB,, | Performed by: SURGERY

## 2022-05-24 PROCEDURE — 1160F PR REVIEW ALL MEDS BY PRESCRIBER/CLIN PHARMACIST DOCUMENTED: ICD-10-PCS | Mod: CPTII,S$GLB,, | Performed by: SURGERY

## 2022-05-24 PROCEDURE — 3288F PR FALLS RISK ASSESSMENT DOCUMENTED: ICD-10-PCS | Mod: CPTII,S$GLB,, | Performed by: SURGERY

## 2022-05-25 ENCOUNTER — TELEPHONE (OUTPATIENT)
Dept: SURGERY | Facility: CLINIC | Age: 67
End: 2022-05-25
Payer: MEDICARE

## 2022-05-25 NOTE — TELEPHONE ENCOUNTER
Returned call to  regarding message left . Patient asking if she can use deodorant yet. Also patient will forward insurance from to [facility] by Mail. Per  patient may use deodorant again . Mrs. Landry appreciated the return call .

## 2022-05-30 ENCOUNTER — OFFICE VISIT (OUTPATIENT)
Dept: INTERNAL MEDICINE | Facility: CLINIC | Age: 67
End: 2022-05-30
Payer: MEDICARE

## 2022-05-30 VITALS
BODY MASS INDEX: 19.84 KG/M2 | DIASTOLIC BLOOD PRESSURE: 70 MMHG | HEART RATE: 80 BPM | WEIGHT: 107.81 LBS | HEIGHT: 62 IN | OXYGEN SATURATION: 96 % | SYSTOLIC BLOOD PRESSURE: 116 MMHG

## 2022-05-30 DIAGNOSIS — J41.1 MUCOPURULENT CHRONIC BRONCHITIS: ICD-10-CM

## 2022-05-30 DIAGNOSIS — Z17.0 CARCINOMA OF UPPER-OUTER QUADRANT OF RIGHT BREAST IN FEMALE, ESTROGEN RECEPTOR POSITIVE: ICD-10-CM

## 2022-05-30 DIAGNOSIS — I48.0 PAF (PAROXYSMAL ATRIAL FIBRILLATION): ICD-10-CM

## 2022-05-30 DIAGNOSIS — I10 ESSENTIAL HYPERTENSION: ICD-10-CM

## 2022-05-30 DIAGNOSIS — Z86.010 HISTORY OF ADENOMATOUS POLYP OF COLON: ICD-10-CM

## 2022-05-30 DIAGNOSIS — I70.0 ATHEROSCLEROSIS OF AORTA: ICD-10-CM

## 2022-05-30 DIAGNOSIS — Z00.00 ANNUAL PHYSICAL EXAM: Primary | ICD-10-CM

## 2022-05-30 DIAGNOSIS — C50.411 CARCINOMA OF UPPER-OUTER QUADRANT OF RIGHT BREAST IN FEMALE, ESTROGEN RECEPTOR POSITIVE: ICD-10-CM

## 2022-05-30 PROCEDURE — 1125F AMNT PAIN NOTED PAIN PRSNT: CPT | Mod: CPTII,S$GLB,, | Performed by: INTERNAL MEDICINE

## 2022-05-30 PROCEDURE — 3078F PR MOST RECENT DIASTOLIC BLOOD PRESSURE < 80 MM HG: ICD-10-PCS | Mod: CPTII,S$GLB,, | Performed by: INTERNAL MEDICINE

## 2022-05-30 PROCEDURE — 3008F PR BODY MASS INDEX (BMI) DOCUMENTED: ICD-10-PCS | Mod: CPTII,S$GLB,, | Performed by: INTERNAL MEDICINE

## 2022-05-30 PROCEDURE — 1160F RVW MEDS BY RX/DR IN RCRD: CPT | Mod: CPTII,S$GLB,, | Performed by: INTERNAL MEDICINE

## 2022-05-30 PROCEDURE — 3288F PR FALLS RISK ASSESSMENT DOCUMENTED: ICD-10-PCS | Mod: CPTII,S$GLB,, | Performed by: INTERNAL MEDICINE

## 2022-05-30 PROCEDURE — 1159F PR MEDICATION LIST DOCUMENTED IN MEDICAL RECORD: ICD-10-PCS | Mod: CPTII,S$GLB,, | Performed by: INTERNAL MEDICINE

## 2022-05-30 PROCEDURE — 1101F PT FALLS ASSESS-DOCD LE1/YR: CPT | Mod: CPTII,S$GLB,, | Performed by: INTERNAL MEDICINE

## 2022-05-30 PROCEDURE — 99397 PR PREVENTIVE VISIT,EST,65 & OVER: ICD-10-PCS | Mod: GY,S$GLB,, | Performed by: INTERNAL MEDICINE

## 2022-05-30 PROCEDURE — 1101F PR PT FALLS ASSESS DOC 0-1 FALLS W/OUT INJ PAST YR: ICD-10-PCS | Mod: CPTII,S$GLB,, | Performed by: INTERNAL MEDICINE

## 2022-05-30 PROCEDURE — 1159F MED LIST DOCD IN RCRD: CPT | Mod: CPTII,S$GLB,, | Performed by: INTERNAL MEDICINE

## 2022-05-30 PROCEDURE — 3008F BODY MASS INDEX DOCD: CPT | Mod: CPTII,S$GLB,, | Performed by: INTERNAL MEDICINE

## 2022-05-30 PROCEDURE — 1160F PR REVIEW ALL MEDS BY PRESCRIBER/CLIN PHARMACIST DOCUMENTED: ICD-10-PCS | Mod: CPTII,S$GLB,, | Performed by: INTERNAL MEDICINE

## 2022-05-30 PROCEDURE — 1125F PR PAIN SEVERITY QUANTIFIED, PAIN PRESENT: ICD-10-PCS | Mod: CPTII,S$GLB,, | Performed by: INTERNAL MEDICINE

## 2022-05-30 PROCEDURE — 3078F DIAST BP <80 MM HG: CPT | Mod: CPTII,S$GLB,, | Performed by: INTERNAL MEDICINE

## 2022-05-30 PROCEDURE — 4010F PR ACE/ARB THEARPY RXD/TAKEN: ICD-10-PCS | Mod: CPTII,S$GLB,, | Performed by: INTERNAL MEDICINE

## 2022-05-30 PROCEDURE — 99397 PER PM REEVAL EST PAT 65+ YR: CPT | Mod: GY,S$GLB,, | Performed by: INTERNAL MEDICINE

## 2022-05-30 PROCEDURE — 99999 PR PBB SHADOW E&M-EST. PATIENT-LVL IV: CPT | Mod: PBBFAC,,, | Performed by: INTERNAL MEDICINE

## 2022-05-30 PROCEDURE — 3288F FALL RISK ASSESSMENT DOCD: CPT | Mod: CPTII,S$GLB,, | Performed by: INTERNAL MEDICINE

## 2022-05-30 PROCEDURE — 3074F PR MOST RECENT SYSTOLIC BLOOD PRESSURE < 130 MM HG: ICD-10-PCS | Mod: CPTII,S$GLB,, | Performed by: INTERNAL MEDICINE

## 2022-05-30 PROCEDURE — 99999 PR PBB SHADOW E&M-EST. PATIENT-LVL IV: ICD-10-PCS | Mod: PBBFAC,,, | Performed by: INTERNAL MEDICINE

## 2022-05-30 PROCEDURE — 3074F SYST BP LT 130 MM HG: CPT | Mod: CPTII,S$GLB,, | Performed by: INTERNAL MEDICINE

## 2022-05-30 PROCEDURE — 4010F ACE/ARB THERAPY RXD/TAKEN: CPT | Mod: CPTII,S$GLB,, | Performed by: INTERNAL MEDICINE

## 2022-05-30 RX ORDER — LISINOPRIL 10 MG/1
10 TABLET ORAL DAILY
Qty: 90 TABLET | Refills: 3 | Status: SHIPPED | OUTPATIENT
Start: 2022-05-30 | End: 2022-05-30

## 2022-05-30 RX ORDER — LISINOPRIL 5 MG/1
TABLET ORAL
Qty: 90 TABLET | Refills: 3 | Status: SHIPPED | OUTPATIENT
Start: 2022-05-30 | End: 2022-06-08 | Stop reason: CLARIF

## 2022-05-30 RX ORDER — AMLODIPINE BESYLATE 10 MG/1
10 TABLET ORAL DAILY
Qty: 90 TABLET | Refills: 3 | Status: SHIPPED | OUTPATIENT
Start: 2022-05-30 | End: 2022-06-08 | Stop reason: CLARIF

## 2022-05-30 RX ORDER — ALBUTEROL SULFATE 90 UG/1
AEROSOL, METERED RESPIRATORY (INHALATION)
Qty: 54 G | Refills: 3 | Status: SHIPPED | OUTPATIENT
Start: 2022-05-30 | End: 2023-06-23

## 2022-05-30 NOTE — PROGRESS NOTES
"Subjective:       Patient ID: Bhavna Landry is a 66 y.o. female.    Chief Complaint: Annual Exam    HPI   Doing well s/p mastectomy for breast cancer.  Number of lymph nodes with isolated tumor cells:  1    Prior smoker.   Cough related to PND.  Uses albuterol bid.    Cognitive eval reviewed - " Recent neuro psychological testing did not find strong evidence of underlying neuro degenerative process but noted that changes associated with history of schizophrenia are difficult to assess based on no previous known baseline with that examiner.  For this reason, the patient will undergo repeat testing in approximately 1 year.  We will proceed conservatively with monitoring over time."   She has subjective memory issues.  Driving without difficulty.   Does housework, sees friends socially.    Dr Cornelius is managing psoriatic arthritis, osteoporosis.    BOthersome hand pain, L pelvis, ankles, toes.    Received Reclast 3/2022    H/o skin cancer, managed by DR Vega.    Chronic hyponatremia, restricting water.  She stopped salt tabs some time ago.    E htn, controlled.   Dig htn readings reviewed.  She is taking lisinopirl 5 mg (nt 10 mg) and amlopidine 10 mg daily.    Chronically constipated.     Intermittent a fib, for which she is taking asa.  Review of Systems   Constitutional: Negative for fever and unexpected weight change.   HENT: Negative for nasal congestion and postnasal drip.    Respiratory: Negative for chest tightness, shortness of breath and wheezing.    Cardiovascular: Negative for chest pain and leg swelling.   Gastrointestinal: Negative for abdominal pain, anal bleeding, constipation, diarrhea, nausea and vomiting.   Genitourinary: Negative for dysuria and urgency.   Musculoskeletal: Positive for arthralgias.   Integumentary:  Negative for rash.   Neurological: Negative for headaches.   Psychiatric/Behavioral: Negative for dysphoric mood and sleep disturbance. The patient is not nervous/anxious.        "   Objective:      Physical Exam  Constitutional:       General: She is not in acute distress.     Appearance: She is well-developed.   HENT:      Head: Normocephalic and atraumatic.      Right Ear: External ear normal.      Left Ear: External ear normal.      Nose: Nose normal.   Eyes:      General: No scleral icterus.        Right eye: No discharge.         Left eye: No discharge.      Conjunctiva/sclera: Conjunctivae normal.      Pupils: Pupils are equal, round, and reactive to light.   Neck:      Thyroid: No thyromegaly.      Vascular: No JVD.   Cardiovascular:      Rate and Rhythm: Normal rate and regular rhythm.      Heart sounds: Normal heart sounds. No murmur heard.    No gallop.   Pulmonary:      Effort: Pulmonary effort is normal. No respiratory distress.      Breath sounds: Normal breath sounds. No wheezing or rales.   Abdominal:      General: Bowel sounds are normal. There is no distension.      Palpations: Abdomen is soft. There is no mass.      Tenderness: There is no abdominal tenderness. There is no guarding or rebound.   Musculoskeletal:         General: No tenderness. Normal range of motion.      Cervical back: Normal range of motion and neck supple.   Lymphadenopathy:      Cervical: No cervical adenopathy.   Skin:     General: Skin is warm and dry.      Findings: No rash.   Neurological:      Mental Status: She is alert and oriented to person, place, and time.      Cranial Nerves: No cranial nerve deficit.      Coordination: Coordination normal.   Psychiatric:         Behavior: Behavior normal.         Thought Content: Thought content normal.         Judgment: Judgment normal.         Lab Results   Component Value Date    WBC 8.25 04/13/2022    HGB 13.2 04/13/2022    HCT 40.8 04/13/2022     04/13/2022    CHOL 169 05/23/2022    TRIG 53 05/23/2022     (H) 05/23/2022    ALT 15 04/13/2022    AST 23 04/13/2022     04/13/2022    K 4.5 04/13/2022     04/13/2022    CREATININE 0.9  04/13/2022    BUN 5 (L) 04/13/2022    CO2 28 04/13/2022    TSH 2.265 03/30/2021    INR 1.0 02/14/2014    GLUF 100 07/01/2009    HGBA1C 5.3 12/11/2018     Assessment:       Problem List Items Addressed This Visit     Essential hypertension (Chronic)    Relevant Medications    lisinopriL (PRINIVIL,ZESTRIL) 5 MG tablet    amLODIPine (NORVASC) 10 MG tablet    PAF (paroxysmal atrial fibrillation)    Mucopurulent chronic bronchitis    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, estrogen receptor positive    Atherosclerosis of aorta      Other Visit Diagnoses     Annual physical exam    -  Primary          Plan:       Bhavna was seen today for annual exam.    Diagnoses and all orders for this visit:    Annual physical exam    Essential hypertension  -     lisinopriL (PRINIVIL,ZESTRIL) 5 MG tablet; 1 tab po q day  -     amLODIPine (NORVASC) 10 MG tablet; Take 1 tablet (10 mg total) by mouth once daily.    PAF (paroxysmal atrial fibrillation)    Mucopurulent chronic bronchitis    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, estrogen receptor positive    Atherosclerosis of aorta    Other orders  -     Discontinue: lisinopriL 10 MG tablet; Take 1 tablet (10 mg total) by mouth once daily.           We discussed colonoscopy - adenomatous colon polyp 2011.  Subsequent scope 2016: hyperplastic polyps  She will let me know when she is ready - now focused on tx of br cancer.    Miralax.

## 2022-06-02 ENCOUNTER — TELEPHONE (OUTPATIENT)
Dept: SURGERY | Facility: CLINIC | Age: 67
End: 2022-06-02
Payer: MEDICARE

## 2022-06-02 NOTE — TELEPHONE ENCOUNTER
Spoke to patient who stated that she has a small hard area (smaller than a golf ball) in her axilla.  Patient denied redness, bruising, warmth, drainage, pain or fever. Explained that it could be the accumulation of fluid under the skin.  Patient will call if any of above mentioned  symptoms develop or if the area gets any larger.  Explained that it could be the accumulation of fluid under the skin.  Patient verbalized understanding and had no further concerns.

## 2022-06-06 ENCOUNTER — TELEPHONE (OUTPATIENT)
Dept: SURGERY | Facility: CLINIC | Age: 67
End: 2022-06-06
Payer: MEDICARE

## 2022-06-06 NOTE — TELEPHONE ENCOUNTER
Patient called in complaining of jelly like swelling on top of breast. Denies redness or warmth. Offered patient appointment today, pt requested to be scheduled on Wednesday.   Spoke with patient about disability paperwork. Faxed to disability desk on 6/1; have not received paperwork back for Dr. Dhillno to sign. Pt made aware.

## 2022-06-07 ENCOUNTER — OFFICE VISIT (OUTPATIENT)
Dept: HEMATOLOGY/ONCOLOGY | Facility: CLINIC | Age: 67
End: 2022-06-07
Payer: MEDICARE

## 2022-06-07 VITALS
HEIGHT: 62 IN | DIASTOLIC BLOOD PRESSURE: 55 MMHG | RESPIRATION RATE: 16 BRPM | OXYGEN SATURATION: 97 % | SYSTOLIC BLOOD PRESSURE: 100 MMHG | HEART RATE: 77 BPM | TEMPERATURE: 98 F | BODY MASS INDEX: 19.63 KG/M2 | WEIGHT: 106.69 LBS

## 2022-06-07 DIAGNOSIS — M85.89 OSTEOPENIA OF MULTIPLE SITES: ICD-10-CM

## 2022-06-07 DIAGNOSIS — C50.911 INVASIVE DUCTAL CARCINOMA OF BREAST, FEMALE, RIGHT: Primary | ICD-10-CM

## 2022-06-07 DIAGNOSIS — C50.411 CARCINOMA OF UPPER-OUTER QUADRANT OF RIGHT BREAST IN FEMALE, ESTROGEN RECEPTOR POSITIVE: Primary | ICD-10-CM

## 2022-06-07 DIAGNOSIS — Z17.0 CARCINOMA OF UPPER-OUTER QUADRANT OF RIGHT BREAST IN FEMALE, ESTROGEN RECEPTOR POSITIVE: Primary | ICD-10-CM

## 2022-06-07 PROCEDURE — 3288F PR FALLS RISK ASSESSMENT DOCUMENTED: ICD-10-PCS | Mod: CPTII,S$GLB,, | Performed by: INTERNAL MEDICINE

## 2022-06-07 PROCEDURE — 1101F PT FALLS ASSESS-DOCD LE1/YR: CPT | Mod: CPTII,S$GLB,, | Performed by: INTERNAL MEDICINE

## 2022-06-07 PROCEDURE — 3008F PR BODY MASS INDEX (BMI) DOCUMENTED: ICD-10-PCS | Mod: CPTII,S$GLB,, | Performed by: INTERNAL MEDICINE

## 2022-06-07 PROCEDURE — 3078F PR MOST RECENT DIASTOLIC BLOOD PRESSURE < 80 MM HG: ICD-10-PCS | Mod: CPTII,S$GLB,, | Performed by: INTERNAL MEDICINE

## 2022-06-07 PROCEDURE — 3074F PR MOST RECENT SYSTOLIC BLOOD PRESSURE < 130 MM HG: ICD-10-PCS | Mod: CPTII,S$GLB,, | Performed by: INTERNAL MEDICINE

## 2022-06-07 PROCEDURE — 99999 PR PBB SHADOW E&M-EST. PATIENT-LVL V: CPT | Mod: PBBFAC,,, | Performed by: INTERNAL MEDICINE

## 2022-06-07 PROCEDURE — 3008F BODY MASS INDEX DOCD: CPT | Mod: CPTII,S$GLB,, | Performed by: INTERNAL MEDICINE

## 2022-06-07 PROCEDURE — 1125F AMNT PAIN NOTED PAIN PRSNT: CPT | Mod: CPTII,S$GLB,, | Performed by: INTERNAL MEDICINE

## 2022-06-07 PROCEDURE — 3078F DIAST BP <80 MM HG: CPT | Mod: CPTII,S$GLB,, | Performed by: INTERNAL MEDICINE

## 2022-06-07 PROCEDURE — 99499 RISK ADDL DX/OHS AUDIT: ICD-10-PCS | Mod: S$GLB,,, | Performed by: INTERNAL MEDICINE

## 2022-06-07 PROCEDURE — 1101F PR PT FALLS ASSESS DOC 0-1 FALLS W/OUT INJ PAST YR: ICD-10-PCS | Mod: CPTII,S$GLB,, | Performed by: INTERNAL MEDICINE

## 2022-06-07 PROCEDURE — 99205 OFFICE O/P NEW HI 60 MIN: CPT | Mod: S$GLB,,, | Performed by: INTERNAL MEDICINE

## 2022-06-07 PROCEDURE — 1125F PR PAIN SEVERITY QUANTIFIED, PAIN PRESENT: ICD-10-PCS | Mod: CPTII,S$GLB,, | Performed by: INTERNAL MEDICINE

## 2022-06-07 PROCEDURE — 99205 PR OFFICE/OUTPT VISIT, NEW, LEVL V, 60-74 MIN: ICD-10-PCS | Mod: S$GLB,,, | Performed by: INTERNAL MEDICINE

## 2022-06-07 PROCEDURE — 99499 UNLISTED E&M SERVICE: CPT | Mod: S$GLB,,, | Performed by: INTERNAL MEDICINE

## 2022-06-07 PROCEDURE — 3074F SYST BP LT 130 MM HG: CPT | Mod: CPTII,S$GLB,, | Performed by: INTERNAL MEDICINE

## 2022-06-07 PROCEDURE — 3288F FALL RISK ASSESSMENT DOCD: CPT | Mod: CPTII,S$GLB,, | Performed by: INTERNAL MEDICINE

## 2022-06-07 PROCEDURE — 99999 PR PBB SHADOW E&M-EST. PATIENT-LVL V: ICD-10-PCS | Mod: PBBFAC,,, | Performed by: INTERNAL MEDICINE

## 2022-06-07 PROCEDURE — 4010F PR ACE/ARB THEARPY RXD/TAKEN: ICD-10-PCS | Mod: CPTII,S$GLB,, | Performed by: INTERNAL MEDICINE

## 2022-06-07 PROCEDURE — 4010F ACE/ARB THERAPY RXD/TAKEN: CPT | Mod: CPTII,S$GLB,, | Performed by: INTERNAL MEDICINE

## 2022-06-07 NOTE — PROGRESS NOTES
CONSULT NOTE    Subjective:       Patient ID: Bhavna Landry is a 66 y.o. female.  MRN: [id number]  : 1955    Chief Complaint: Breast Cancer  pT1b N0 (i+)  grade 2 ER + ME - HER2 - IDC of the right breast.    History of Present Illness:   Bhavna Landry is a 66 y.o. female who is referred for right breast IDC.     She presented for screening mammogram on 2022 . This identified an asymmetry in the right breast. Follow-up mammogram and ultrasound on 2022 showed a 1 x 0.9 x 0.8 cm mass at the 9 o'clock position of the right breast 3 cm from the nipple. A ultrasound guided biopsy was performed on 2022 with pathology revealing invasive ductal carcinoma of the breast.     She saw Dr. Dhillon and underwent right mastectomy and sentinel node exam.  It showed IDC 8 mm, grade 2, ER strongly positive, ME negative, HER2/jayla 1+, Ki-67 12%.  There was also DCIS, multiple foci up to 8 mm, spanning a distance of 20 mm.  Nuclear grade 2. Two sentinel nodes removed, 1 had isolated tumor cells.    She presents today with her daughter to discuss further recommendations.  She has recovered well from surgery.    GYN History:  Age of menarche was 12. Menopause at 30 when she had a hysterectomy.  Ovaries in place.  Patient denies hormonal therapy. Patient is . Age of first live birth was 22.    FH:  Both parents had cancer, later in life. Mother had colon cancer, father had lymphoma?    Oncology History:  1. Breast, right, mastectomy:   - Invasive ductal carcinoma       - Greatest dimension:  8 mm       - Jonathan-Alaniz modified SBR score 3 + 2 + 1 = 6 of 9       - Histologic grade 2 of 3       - Mitotic index = 0.2 (average mitoses per high power field) (low)       - Resection margin free of invasive carcinoma           - Invasive carcinoma present 9 mm to closest posterior margin, 26 mm   to inferior margin, and 45 mm to superior margin   - Biopsy clip present   - No  "lymphovascular invasion is identified   - Ductal carcinoma in situ       - Foci up to 8 mm in greatest dimension spanning a distance of   approximately 20 mm       - Nuclear grade ranges from 2 to 3 of 3       - Solid and cribriform patterns       - Central comedonecrosis present:  Approximately 80%       - Resection margin free of DCIS           - DCIS present present 9 mm to closest posterior margin, 26 mm to   inferior margin, and 45 mm to superior margin   - Background breast contains usual ductal hyperplasia, apocrine metaplasia,   microcysts and macrocysts, ectatic ducts, and stromal fibrosis   - Microcalcifications, calcium phosphate type, associated with DCIS and   focally with medial calcific arterial stenosis (Monckeberg's sclerosis)   - See CAP Tumor Synoptic   2. [place] lymph node, right axillary, "#1 hot and blue count 2004,"   excision:   - 1 lymph node, isolated tumor cells present   3. [place] lymph node, right axillary, "#2 hot and blue count 1700,"   excision:   - 1 lymph node, negative for metastatic carcinoma   CAP Tumor Synoptic:   Procedure:  Total mastectomy   Specimen laterality:  Right   Tumor site:  9 o'clock   Histologic type:  Invasive carcinoma of no special type (ductal)   Histologic grade (Shelly histologic score):  Shelly score 3 + 2 + 1 =   6 of 9   Glandular (acinar) / tubular differentiation:  Score 3   Nuclear pleomorphism:  Score 2   Mitotic grade:  Score 1   Overall grade:  Grade 2   Tumor size:  8 mm   Tumor focality:  Single focus of invasive carcinoma   Ductal carcinoma in situ (DCIS):  Present (Negative for extensive intraductal   component)       Size (extent) of DCIS:  Foci up to 8 mm in greatest dimension spanning a   distance of approximately 20 mm       Number blocks with DCIS:  4       Number blocks examined:  13       Architectural patterns:  Comedo, cribriform, solid       Nuclear grade:  Ranges from grade 2 (intermediate) to grade 3 (high)   Lobular " carcinoma in situ (LCIS):  Not identified   Tumor extent:  Not applicable   Lymphovascular invasion:  Not identified   Dermal lymphovascular invasion:  Not identified   Microcalcifications:  Present in DCIS and nonneoplastic tissue   Treatment effect in the breast:  No known pre-surgical therapy   Treatment effect in the lymph nodes:  Not applicable   Margin status for invasive carcinoma:  All margins negative for invasive   carcinoma       Distance from invasive carcinoma closest posterior margin:  9 mm       Distance from invasive carcinoma to inferior margin:  26 mm       Distance from invasive carcinoma to superior margin:  45 mm   Margin status for DCIS:  All margins negative for DCIS       Distant from DCIS to closest posterior margin:  9 mm       Distance from DCIS to inferior margin:  26 mm       Distance from DCIS to superior margin:  45 mm   Regional lymph node status:  Tumor present in regional lymph nodes   Number of lymph nodes with macrometastases:  0   Number of lymph nodes with micrometastases:  0   Number of lymph nodes with isolated tumor cells:  1   Size of largest jose l metastatic deposit:  0.13 mm   Extranodal extension:  Not identified   Total number of lymph nodes examined (sentinel and nonsentinel):  2   Number of sentinel nodes examined:  2   Distant sites involved:  Not applicable   TNM descriptors:  Not applicable   pT category:  pT1b   Regional lymph nodes modifier:  (sn)   pN category:  (sn) pN0 (i+)   pM category:  Not applicable - pM cannot be determined from the submitted   specimens   Special studies:  Biomarkers, assessed by immunohistochemistry on prior right   breast biopsy ([facility]-) with following reported results:       - Estrogen Receptor (ER):  Positive (100%; strong intensity)       - Progesterone Receptor (MS):  Negative (<1%)       - HER2:  Negative (1+)       - Ki-67 proliferation index:  12% (low)   Oncology History   Carcinoma of upper-outer quadrant of right breast  in female, estrogen receptor positive   2022 Initial Diagnosis    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, estrogen receptor positive     2022 Cancer Staged    Staging form: Breast, AJCC 8th Edition  - Clinical stage from 2022: Stage IA (cT1b, cN0, cM0, G2, ER+, MS-, HER2-)         History:  Past Medical History:   Diagnosis Date    Allergy     Amblyopia     Anemia     Anticoagulant long-term use     Arthritis 1992    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, estrogen receptor positive 2022    Cataract     Depression     Dry eyes     Dry mouth     Duane's syndrome of right eye     Fibromyalgia 2014    Fibromyalgia     Fractured hip     RIGHT HIP    GERD (gastroesophageal reflux disease)     History of psychiatric hospitalization     Hyperlipidemia 1992    Hypertension     Hypocalcemia     Hyponatremia     Kidney stone     Migraine headache     Osteoporosis     Psoriatic arthritis 1992    Right knee pain     post knee replacement surgery (possible rejectiion of metal)    RLS (restless legs syndrome)     Schizophrenia 1992    stable on meds    Squamous cell carcinoma of skin     Urinary tract infection       Past Surgical History:   Procedure Laterality Date    brow ptosis repair Right 2019    Surgeon: Dr. Karla Henson    CATARACT EXTRACTION       SECTION      COLONOSCOPY N/A 2016    Procedure: COLONOSCOPY;  Surgeon: ANDRIA Connelly MD;  Location: [facility] ([address] [place] [address]);  Service: Endoscopy;  Laterality: N/A;    cyst removed from right sinus  1982    HYSTERECTOMY      VAGINAL HYSTERECTOMY WITHOUT BSO - ENDOMETRIOSIS    INJECTION FOR SENTINEL NODE IDENTIFICATION Right 2022    Procedure: INJECTION, FOR SENTINEL NODE IDENTIFICATION-Right;  Surgeon: NEAL Dhillon MD;  Location: [facility];  Service: General;  Laterality: Right;    INJECTION OF ANESTHETIC AGENT AROUND MEDIAL  BRANCH NERVES INNERVATING LUMBAR FACET JOINT N/A 4/11/2019    Procedure: Lumbo-sacral Block, DR5 and Lateral Branches of S1,S2, S3;  Surgeon: Michelle Pineda Jr., MD;  Location: [facility];  Service: Pain Management;  Laterality: N/A;  Pt takes  and states she holds ASA on her own whenever she has procedures.  Instructed to hold x 3 days prior to procedure.      INJECTION OF ANESTHETIC AGENT INTO SACROILIAC JOINT Right 8/30/2018    Procedure: BLOCK, SACROILIAC JOINT-Right- ORAL SEDATION;  Surgeon: Michelle Pineda Jr., MD;  Location: [facility];  Service: Pain Management;  Laterality: Right;    INJECTION OF ANESTHETIC AGENT INTO SACROILIAC JOINT Bilateral 9/27/2018    Procedure: BLOCK, SACROILIAC JOINT-BILATERAL;  Surgeon: Michelle Pineda Jr., MD;  Location: [facility];  Service: Pain Management;  Laterality: Bilateral;  Please keep at 10:00 due to trasnsportation    INJECTION OF ANESTHETIC AGENT INTO SACROILIAC JOINT Bilateral 2/7/2019    Procedure: Bilateral Sacroiliac Joint Injection - Per Dr Pineda, not necessary to hold ASA.;  Surgeon: Michelle Pineda Jr., MD;  Location: [facility];  Service: Pain Management;  Laterality: Bilateral;    INTRAOCULAR PROSTHESES INSERTION Right 8/1/2019    Procedure: INSERTION, IOL PROSTHESIS;  Surgeon: Karen Song MD;  Location: [address];  Service: Ophthalmology;  Laterality: Right;    INTRAOCULAR PROSTHESES INSERTION Left 9/26/2019    Procedure: INSERTION, IOL PROSTHESIS;  Surgeon: Karen Song MD;  Location: [address];  Service: Ophthalmology;  Laterality: Left;    JOINT REPLACEMENT Right     knee    KNEE SURGERY      MASTECTOMY Right 5/9/2022    Procedure: MASTECTOMY-Right;  Surgeon: NEAL Dhillon MD;  Location: [facility];  Service: General;  Laterality: Right;  2.5 HOURS    PHACOEMULSIFICATION OF CATARACT Right 8/1/2019    Procedure: PHACOEMULSIFICATION, CATARACT;  Surgeon: Karen Song MD;  Location[facility]  1ST FLR;  Service: Ophthalmology;  Laterality: Right;    PHACOEMULSIFICATION OF CATARACT Left 9/26/2019    Procedure: PHACOEMULSIFICATION, CATARACT;  Surgeon: Karen Song MD;  Location: [facility] OR 1ST FLR;  Service: Ophthalmology;  Laterality: Left;    RADIOFREQUENCY THERMOCOAGULATION Right 5/7/2019    Procedure: RADIOFREQUENCY THERMAL COAGULATION RIGHT DORSAL RAMUS 5 AND LATERAL BRANCH OF S1, S2 AND S3;  Surgeon: Michelle Pineda Jr., MD;  Location: [facility];  Service: Pain Management;  Laterality: Right;    RADIOFREQUENCY THERMOCOAGULATION Left 5/14/2019    Procedure: RADIOFREQUENCY THERMAL COAGULATION LEFT DORSAL RAMUS 5 AND LATERAL BRANCH OF S1,S2 AND S3;  Surgeon: Michelle Pineda Jr., MD;  Location: [facility];  Service: Pain Management;  Laterality: Left;    RADIOFREQUENCY THERMOCOAGULATION Right 10/22/2019    Procedure: RADIOFREQUENCY THERMAL COAGULATION---DARSAL RAMUS 5 and LATERAL S1,S2,and S3 Right;  Surgeon: Michelle Pineda Jr., MD;  Location: [facility];  Service: Pain Management;  Laterality: Right;  patient to sign consent DOS    RADIOFREQUENCY THERMOCOAGULATION Left 10/29/2019    Procedure: RADIOFREQUENCY THERMAL COAGULATION - LEFT - DR5, S1,S2, AND S3;  Surgeon: Michelle Pineda Jr., MD;  Location: [facility];  Service: Pain Management;  Laterality: Left;    RADIOFREQUENCY THERMOCOAGULATION Left 5/26/2020    Procedure: RADIOFREQUENCY THERMAL COAGULATION--Left DR5+ lateral branches of S1, S2, S3;  Surgeon: Michelle Pineda Jr., MD;  Location: [facility];  Service: Pain Management;  Laterality: Left;    RADIOFREQUENCY THERMOCOAGULATION Right 6/2/2020    Procedure: RADIOFREQUENCY THERMAL COAGULATION--Right DR5+ lateral branches of S1, S2, S3;  Surgeon: Michelle Pineda Jr., MD;  Location: [facility];  Service: Pain Management;  Laterality: Right;    SENTINEL LYMPH NODE BIOPSY Right 5/9/2022    Procedure: BIOPSY, LYMPH NODE, SENTINEL-Right;  Surgeon: NEAL Dhillon,  MD;  Location: [facility];  Service: General;  Laterality: Right;    Surgery on right knee  07/09/1982    tumor removed from back left side upper shoulder  03/17/2006     Family History   Problem Relation Age of Onset    Colon cancer Mother     Psoriasis Mother     Cancer Mother         colon    Depression Mother     Cancer Father         lymphoma    Stroke Sister     Diabetes Brother     Arthritis Brother     Heart disease Brother         cad    No Known Problems Daughter     Hypertension Neg Hx     Suicide Neg Hx     Amblyopia Neg Hx     Blindness Neg Hx     Cataracts Neg Hx     Glaucoma Neg Hx     Macular degeneration Neg Hx     Retinal detachment Neg Hx     Strabismus Neg Hx       Social History     Tobacco Use    Smoking status: Current Every Day Smoker     Packs/day: 0.25     Years: 10.00     Pack years: 2.50     Types: Cigarettes    Smokeless tobacco: Former User    Tobacco comment: about 5 cig a day   Substance and Sexual Activity    Alcohol use: No    Drug use: No    Sexual activity: Not Currently     Partners: Male     Birth control/protection: Post-menopausal        ROS:   Review of Systems   Constitutional: Negative for fever, malaise/fatigue and weight loss.   HENT: Negative for congestion, ear pain, nosebleeds and sore throat.    Eyes: Negative for blurred vision, double vision and photophobia.   Respiratory: Negative for cough, hemoptysis, sputum production, shortness of breath, wheezing and stridor.    Cardiovascular: Negative for chest pain, palpitations, orthopnea and leg swelling.   Gastrointestinal: Negative for abdominal pain, blood in stool, constipation, diarrhea, heartburn, melena, nausea and vomiting.   Genitourinary: Negative for dysuria and hematuria.   Musculoskeletal: Negative for back pain, myalgias and neck pain.   Skin: Negative for itching and rash.   Neurological: Negative for dizziness, focal weakness, seizures, weakness and headaches.   Endo/Heme/Allergies:  "Negative for polydipsia. Does not bruise/bleed easily.   Psychiatric/Behavioral: Positive for depression. Negative for memory loss. The patient is nervous/anxious. The patient does not have insomnia.         Objective:     Vitals:    06/07/22 0859   BP: (!) 100/55   Pulse: 77   Resp: 16   Temp: 98 °F (36.7 °C)   TempSrc: Oral   SpO2: 97%   Weight: 48.4 kg (106 lb 11.2 oz)   Height: 5' 2" (1.575 m)   PainSc:   4   PainLoc: Breast       Physical Examination:   Physical Exam  Vitals and nursing note reviewed.   Constitutional:       General: She is not in acute distress.     Appearance: She is not diaphoretic.   HENT:      Head: Normocephalic.      Mouth/Throat:      Pharynx: No oropharyngeal exudate.   Eyes:      General: No scleral icterus.     Conjunctiva/sclera: Conjunctivae normal.   Neck:      Thyroid: No thyromegaly.   Cardiovascular:      Rate and Rhythm: Normal rate and regular rhythm.      Heart sounds: Normal heart sounds. No murmur heard.  Pulmonary:      Effort: Pulmonary effort is normal. No respiratory distress.      Breath sounds: No stridor. No wheezing or rales.   Chest:      Chest wall: No tenderness.   Abdominal:      General: Bowel sounds are normal. There is no distension.      Palpations: Abdomen is soft. There is no mass.      Tenderness: There is no abdominal tenderness. There is no rebound.   Musculoskeletal:         General: No tenderness or deformity. Normal range of motion.      Cervical back: Neck supple.   Lymphadenopathy:      Cervical: No cervical adenopathy.   Skin:     General: Skin is warm and dry.      Findings: No erythema or rash.      Comments: Right mastectomy without reconstruction.  No abnormal breast mass, skin, nipple changes or axillary adenopathy on the left side.   Neurological:      Mental Status: She is alert and oriented to person, place, and time.      Cranial Nerves: No cranial nerve deficit.      Coordination: Coordination normal.      Gait: Gait is intact. "   Psychiatric:         Mood and Affect: Affect normal.         Cognition and Memory: Memory normal.         Judgment: Judgment normal.          Diagnostic Tests:  Significant Imaging: I have reviewed and interpreted all pertinent imaging results/findings.        Laboratory Data:  All pertinent labs have been reviewed.    Labs:   Lab Results   Component Value Date    WBC 8.25 04/13/2022    HGB 13.2 04/13/2022    HCT 40.8 04/13/2022    MCV 98 04/13/2022     04/13/2022       Assessment/Plan:   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, estrogen receptor positive  pT1b N0 (i+)  grade 2 ER + WA - HER2 - IDC of the right breast.    She is status post mastectomy and sentinel node exam for IDC as well as DCIS of the right breast.  We discussed that as per NCCN guidelines, she is a candidate for Oncotype DX testing to assess her recurrence score and offer adjuvant chemotherapy if appropriate.  She is willing and Oncotype DX testing was sent today.  I will see her back in 2 weeks to discuss results and further recommendations.  Clinically, she is likely to have a low recurrence score and would be offered endocrine therapy for up to 5 years.  We discussed aromatase inhibitor therapy versus tamoxifen.  We discussed the role of endocrine therapy in early stage breast cancer to prevent recurrence/systemic and local by about 40-50%.  Major side effects were also discussed.  I will obtain a baseline DEXA scan.  See below.    Osteopenia of multiple sites  Diagnosed with osteopenia/osteoporosis given hip fracture in 2020. Follows up with Dr. Matias at and is on zoledronic acid, most recent does in March 2022.   -     Bone Density Spine And Hip; Future; Expected date: 06/07/2022       ECOG SCORE    1 - Restricted in strenuous activity-ambulatory and able to carry out work of a light nature           Discussion:   No follow-ups on file.    Plan was discussed with the patient at length, and she verbalized  understanding. Bhavna was given an opportunity to ask questions that were answered to her satisfaction, and she was advised to call in the interval if any problems or questions arise.    Electronically signed by Millie Marcelino MD        Route Chart for Scheduling    Med Onc Chart Routing      Follow up with physician . 7/1 1 pm , needs oncotype    Follow up with [facility]    Labs    Imaging DXA scan   Before my visit, to be scheduled in [place], prefers afternoon between 1-2    Pharmacy appointment    Other referrals            Therapy Plan Information  Pre-Medications  acetaminophen tablet 650 mg  650 mg, Oral, Every visit  Medications  zoledronic acid-mannitol & water 5 mg/100 mL infusion 5 mg  5 mg, Intravenous, Every visit  Flushes  sodium chloride 0.9% 250 mL flush bag  Intravenous, Every visit  sodium chloride 0.9% flush 10 mL  10 mL, Intravenous, Every visit  heparin, porcine (PF) 100 unit/mL injection flush 500 Units  500 Units, Intravenous, Every visit  alteplase injection 2 mg  2 mg, Intra-Catheter, Every visit

## 2022-06-08 ENCOUNTER — OFFICE VISIT (OUTPATIENT)
Dept: SURGERY | Facility: CLINIC | Age: 67
End: 2022-06-08
Payer: MEDICARE

## 2022-06-08 VITALS
HEART RATE: 78 BPM | HEIGHT: 62 IN | SYSTOLIC BLOOD PRESSURE: 119 MMHG | BODY MASS INDEX: 19.51 KG/M2 | WEIGHT: 106 LBS | DIASTOLIC BLOOD PRESSURE: 59 MMHG

## 2022-06-08 DIAGNOSIS — N64.89 SEROMA OF BREAST: Primary | ICD-10-CM

## 2022-06-08 DIAGNOSIS — Z90.11 S/P MASTECTOMY, RIGHT: ICD-10-CM

## 2022-06-08 DIAGNOSIS — Z85.3 PERSONAL HISTORY OF BREAST CANCER: ICD-10-CM

## 2022-06-08 PROCEDURE — 99999 PR PBB SHADOW E&M-EST. PATIENT-LVL II: ICD-10-PCS | Mod: PBBFAC,,, | Performed by: PHYSICIAN ASSISTANT

## 2022-06-08 PROCEDURE — 1160F RVW MEDS BY RX/DR IN RCRD: CPT | Mod: CPTII,S$GLB,, | Performed by: PHYSICIAN ASSISTANT

## 2022-06-08 PROCEDURE — 4010F ACE/ARB THERAPY RXD/TAKEN: CPT | Mod: CPTII,S$GLB,, | Performed by: PHYSICIAN ASSISTANT

## 2022-06-08 PROCEDURE — 99024 PR POST-OP FOLLOW-UP VISIT: ICD-10-PCS | Mod: S$GLB,,, | Performed by: PHYSICIAN ASSISTANT

## 2022-06-08 PROCEDURE — 1159F PR MEDICATION LIST DOCUMENTED IN MEDICAL RECORD: ICD-10-PCS | Mod: CPTII,S$GLB,, | Performed by: PHYSICIAN ASSISTANT

## 2022-06-08 PROCEDURE — 3288F PR FALLS RISK ASSESSMENT DOCUMENTED: ICD-10-PCS | Mod: CPTII,S$GLB,, | Performed by: PHYSICIAN ASSISTANT

## 2022-06-08 PROCEDURE — 3008F BODY MASS INDEX DOCD: CPT | Mod: CPTII,S$GLB,, | Performed by: PHYSICIAN ASSISTANT

## 2022-06-08 PROCEDURE — 99024 POSTOP FOLLOW-UP VISIT: CPT | Mod: S$GLB,,, | Performed by: PHYSICIAN ASSISTANT

## 2022-06-08 PROCEDURE — 3078F PR MOST RECENT DIASTOLIC BLOOD PRESSURE < 80 MM HG: ICD-10-PCS | Mod: CPTII,S$GLB,, | Performed by: PHYSICIAN ASSISTANT

## 2022-06-08 PROCEDURE — 1159F MED LIST DOCD IN RCRD: CPT | Mod: CPTII,S$GLB,, | Performed by: PHYSICIAN ASSISTANT

## 2022-06-08 PROCEDURE — 4010F PR ACE/ARB THEARPY RXD/TAKEN: ICD-10-PCS | Mod: CPTII,S$GLB,, | Performed by: PHYSICIAN ASSISTANT

## 2022-06-08 PROCEDURE — 99999 PR PBB SHADOW E&M-EST. PATIENT-LVL II: CPT | Mod: PBBFAC,,, | Performed by: PHYSICIAN ASSISTANT

## 2022-06-08 PROCEDURE — 1160F PR REVIEW ALL MEDS BY PRESCRIBER/CLIN PHARMACIST DOCUMENTED: ICD-10-PCS | Mod: CPTII,S$GLB,, | Performed by: PHYSICIAN ASSISTANT

## 2022-06-08 PROCEDURE — 1101F PR PT FALLS ASSESS DOC 0-1 FALLS W/OUT INJ PAST YR: ICD-10-PCS | Mod: CPTII,S$GLB,, | Performed by: PHYSICIAN ASSISTANT

## 2022-06-08 PROCEDURE — 3074F SYST BP LT 130 MM HG: CPT | Mod: CPTII,S$GLB,, | Performed by: PHYSICIAN ASSISTANT

## 2022-06-08 PROCEDURE — 1125F AMNT PAIN NOTED PAIN PRSNT: CPT | Mod: CPTII,S$GLB,, | Performed by: PHYSICIAN ASSISTANT

## 2022-06-08 PROCEDURE — 3078F DIAST BP <80 MM HG: CPT | Mod: CPTII,S$GLB,, | Performed by: PHYSICIAN ASSISTANT

## 2022-06-08 PROCEDURE — 3074F PR MOST RECENT SYSTOLIC BLOOD PRESSURE < 130 MM HG: ICD-10-PCS | Mod: CPTII,S$GLB,, | Performed by: PHYSICIAN ASSISTANT

## 2022-06-08 PROCEDURE — 3288F FALL RISK ASSESSMENT DOCD: CPT | Mod: CPTII,S$GLB,, | Performed by: PHYSICIAN ASSISTANT

## 2022-06-08 PROCEDURE — 1125F PR PAIN SEVERITY QUANTIFIED, PAIN PRESENT: ICD-10-PCS | Mod: CPTII,S$GLB,, | Performed by: PHYSICIAN ASSISTANT

## 2022-06-08 PROCEDURE — 1101F PT FALLS ASSESS-DOCD LE1/YR: CPT | Mod: CPTII,S$GLB,, | Performed by: PHYSICIAN ASSISTANT

## 2022-06-08 PROCEDURE — 3008F PR BODY MASS INDEX (BMI) DOCUMENTED: ICD-10-PCS | Mod: CPTII,S$GLB,, | Performed by: PHYSICIAN ASSISTANT

## 2022-06-08 NOTE — PROGRESS NOTES
"    [facility]       Post-Op        REFERRING PHYSICIAN:  No referring provider defined for this encounter.       Sadaf Vargas MD    MEDICAL ONCOLOGIST:    pending  RADIATION ONCOLOGIST:   na    DIAGNOSIS:    This is a 66 y.o. female with a stage pT1b N0 M0 grade 2 ER + WA - HER2 - IDC of the right breast.    TREATMENT SUMMARY:  The patient is status post right  mastectomy and sentinel node biopsy on 5/9/2022.  Final pathology showed   1. Breast, right, mastectomy:   - Invasive ductal carcinoma       - Greatest dimension:  8 mm       - Jonathan-Alaniz modified SBR score 3 + 2 + 1 = 6 of 9       - Histologic grade 2 of 3       - Mitotic index = 0.2 (average mitoses per high power field) (low)       - Resection margin free of invasive carcinoma           - Invasive carcinoma present 9 mm to closest posterior margin, 26 mm   to inferior margin, and 45 mm to superior margin   - Biopsy clip present   - No lymphovascular invasion is identified   - Ductal carcinoma in situ       - Foci up to 8 mm in greatest dimension spanning a distance of   approximately 20 mm       - Nuclear grade ranges from 2 to 3 of 3       - Solid and cribriform patterns       - Central comedonecrosis present:  Approximately 80%       - Resection margin free of DCIS           - DCIS present present 9 mm to closest posterior margin, 26 mm to   inferior margin, and 45 mm to superior margin   - Background breast contains usual ductal hyperplasia, apocrine metaplasia,   microcysts and macrocysts, ectatic ducts, and stromal fibrosis   - Microcalcifications, calcium phosphate type, associated with DCIS and   focally with medial calcific arterial stenosis (Monckeberg's sclerosis)   - See CAP Tumor Synoptic   2. [place] lymph node, right axillary, "#1 hot and blue count 2004,"   excision:   - 1 lymph node, isolated tumor cells present   3. [place] lymph node, right axillary, "#2 hot and blue count 1700,"   excision:   - 1 lymph node, negative " for metastatic carcinoma   CAP Tumor Synoptic:   Procedure:  Total mastectomy   Specimen laterality:  Right   Tumor site:  9 o'clock   Histologic type:  Invasive carcinoma of no special type (ductal)   Histologic grade (Shelly histologic score):  Shelly score 3 + 2 + 1 =   6 of 9   Glandular (acinar) / tubular differentiation:  Score 3   Nuclear pleomorphism:  Score 2   Mitotic grade:  Score 1   Overall grade:  Grade 2   Tumor size:  8 mm   Tumor focality:  Single focus of invasive carcinoma   Ductal carcinoma in situ (DCIS):  Present (Negative for extensive intraductal   component)       Size (extent) of DCIS:  Foci up to 8 mm in greatest dimension spanning a   distance of approximately 20 mm       Number blocks with DCIS:  4       Number blocks examined:  13       Architectural patterns:  [place], cribriform, solid       Nuclear grade:  Ranges from grade 2 (intermediate) to grade 3 (high)   Lobular carcinoma in situ (LCIS):  Not identified   Tumor extent:  Not applicable   Lymphovascular invasion:  Not identified   Dermal lymphovascular invasion:  Not identified   Microcalcifications:  Present in DCIS and nonneoplastic tissue   Treatment effect in the breast:  No known pre-surgical therapy   Treatment effect in the lymph nodes:  Not applicable   Margin status for invasive carcinoma:  All margins negative for invasive   carcinoma       Distance from invasive carcinoma closest posterior margin:  9 mm       Distance from invasive carcinoma to inferior margin:  26 mm       Distance from invasive carcinoma to superior margin:  45 mm   Margin status for DCIS:  All margins negative for DCIS       Distant from DCIS to closest posterior margin:  9 mm       Distance from DCIS to inferior margin:  26 mm       Distance from DCIS to superior margin:  45 mm   Regional lymph node status:  Tumor present in regional lymph nodes   Number of lymph nodes with macrometastases:  0   Number of lymph nodes with micrometastases:  0    Number of lymph nodes with isolated tumor cells:  1   Size of largest jose l metastatic deposit:  0.13 mm   Extranodal extension:  Not identified   Total number of lymph nodes examined (sentinel and nonsentinel):  2   Number of sentinel nodes examined:  2   Distant sites involved:  Not applicable   TNM descriptors:  Not applicable   pT category:  pT1b   Regional lymph nodes modifier:  (sn)   pN category:  (sn) pN0 (i+)   pM category:  Not applicable - pM cannot be determined from the submitted   specimens   Special studies:  Biomarkers, assessed by immunohistochemistry on prior right   breast biopsy ([facility]-) with following reported results:       - Estrogen Receptor (ER):  Positive (100%; strong intensity)       - Progesterone Receptor (TN):  Negative (<1%)       - HER2:  Negative (1+)       - Ki-67 proliferation index:  12% (low)     INTERVAL HISTORY:   Bhavna Landry comes in for a post-op check of a possible seroma of her right mastectomy bed. Patient states she is doing well, but notes some mild tenderness inferior to the lateral end of her incision. Otherwise denies HA, fevers, chills or body aches.     MEDICATIONS:  Current Outpatient Medications   Medication Sig Dispense Refill    albuterol (PROAIR HFA) 90 mcg/actuation inhaler 2 puffs qid prn 54 g 3    amLODIPine (NORVASC) 5 MG tablet Take 1 tablet (5 mg total) by mouth every evening. 90 tablet 1    ascorbic acid, vitamin C, (VITAMIN C) 500 MG tablet Take 500 mg by mouth once daily.      aspirin (ECOTRIN) 81 MG EC tablet Take 81 mg by mouth once daily.      atorvastatin (LIPITOR) 80 MG tablet TAKE 1 TABLET(80 MG) BY MOUTH EVERY DAY 90 tablet 3    benztropine (COGENTIN) 0.5 MG tablet Take 1 tablet (0.5 mg total) by mouth 2 (two) times daily. 180 tablet 3    cyanocobalamin 500 MCG tablet Take 500 mcg by mouth once daily.      folic acid (FOLVITE) 1 MG tablet Take 1 tablet (1 mg total) by mouth once daily. 90 tablet 3    gabapentin (NEURONTIN)  300 MG capsule Take 1 capsule (300 mg total) by mouth every evening. 90 capsule 3    isosorbide mononitrate (IMDUR) 30 MG 24 hr tablet TAKE 1 TABLET(30 MG) BY MOUTH EVERY DAY 30 tablet 11    lisinopriL 10 MG tablet Take 1 tablet (10 mg total) by mouth once daily. 90 tablet 1    LUTEIN ORAL Take by mouth.      methotrexate 2.5 MG Tab Take 4 tablets (10 mg total) by mouth every 7 days. 16 tablet 0    multivitamin (THERAGRAN) per tablet Take 1 tablet by mouth once daily.      omega-3 fatty acids/fish oil (FISH OIL-OMEGA-3 FATTY ACIDS) 300-1,000 mg capsule Take by mouth once daily.      risperiDONE (RISPERDAL) 2 MG tablet Take 1 tablet (2 mg total) by mouth every morning. 90 tablet 3    risperiDONE (RISPERDAL) 4 MG tablet TAKE 1 TABLET(4 MG) BY MOUTH EVERY EVENING 90 tablet 3    tofacitinib citrate (XELJANZ ORAL) Take by mouth.      venlafaxine (EFFEXOR-XR) 75 MG 24 hr capsule TAKE 1 CAPSULE(75 MG) BY MOUTH EVERY DAY 30 capsule 11    VITAMIN A ORAL Take by mouth.      vitamin D (VITAMIN D3) 1000 units Tab Take 1,000 Units by mouth once daily.      vitamin E 200 UNIT capsule Take 200 Units by mouth once daily.      zinc gluconate 50 mg tablet Take 50 mg by mouth once daily.      azelastine (ASTELIN) 137 mcg (0.1 %) nasal spray 1 spray (137 mcg total) by Nasal route 2 (two) times daily. (Patient not taking: Reported on 6/7/2022) 30 mL 0    fluticasone propionate (FLONASE) 50 mcg/actuation nasal spray SHAKE LIQUID WELL AND USE 1 SPRAY IN EACH NOSTRIL EVERY DAY (Patient not taking: No sig reported) 48 g 0     No current facility-administered medications for this visit.     Facility-Administered Medications Ordered in Other Visits   Medication Dose Route Frequency Provider Last Rate Last Admin    tropicamide 1% ophthalmic solution 1 drop  1 drop Right Eye On Call Procedure Karen Song MD   1 drop at 08/01/19 0603       ALLERGIES:   Review of patient's allergies indicates:   Allergen Reactions     Etanercept Other (See Comments)     Other reaction(s): recurrent infections    Chloramphenicol sod succinate Hives    Codeine Other (See Comments)     Other reaction(s): Stomach upset. Pt states OK with Percocet    Nickel sutures [surgical stainless steel] Dermatitis     Allergic contact dermatitis    Adhesive Rash       PHYSICAL EXAMINATION:   General:  This is a well appearing female with appropriate speech, affect and gait.     Breast:  Incision clean, dry, and intact. Minimal fluid wave across right mastectomy incision. No redness or edema.     Fine Needle Aspiration Procedure Note    Pre-operative Diagnosis: post operative seroma    Post-operative Diagnosis: same    Location: right breast    Anesthesia: 1% plain lidocaine    Procedure Details   The Procedure, risks and complications have been discussed in detail (including, but not limited to pain, infection, bleeding) with the patient, and the patient has signed consent to have the surgery completed.    The skin was sterilely prepped and draped over the affected area in the usual fashion.  Fine Needle Aspiration was performed with a 18 guage needle using ultrasound guidance.  Contents of Aspiration: 16 ml of serosanguinous fluid.  Size of mass after cyst aspiration: gone  Specimens sent to pathology.  EBL: none  Sterile dressing placed.  May place ice pack over affected area during the first 24 hours.    Condition:  Stable    Complications:  none.      IMPRESSION:   The patient has had an uneventful postoperative course.    PLAN:   1. Aspiration of post operative seroma performed today with good tolerance by the patient.   2. Advised to return in 6 months for a follow up office visit and breast exam  3. The patient is advised in continued exam of the breast chest wall and to report to this office sooner should she note any areas of abnormality or concern.       Kalyani Carson PA-C  Breast Surgery

## 2022-06-13 ENCOUNTER — TELEPHONE (OUTPATIENT)
Dept: HEMATOLOGY/ONCOLOGY | Facility: CLINIC | Age: 67
End: 2022-06-13
Payer: MEDICARE

## 2022-06-13 NOTE — TELEPHONE ENCOUNTER
"----- Message from Chiquis Garvin sent at 6/13/2022  3:56 PM CDT -----  Regarding: Consult/Advisory:           Name Of Caller:  Bhavna    Contact Preference?:  [phone]        What is the nature of the call?:  Pt wanted to let the office know that there were openings for her to get her bone density done until September.  She can only come on July 19th.             Additional Notes:  "Thank you for all that you do for our patients'"     "

## 2022-06-14 ENCOUNTER — PATIENT MESSAGE (OUTPATIENT)
Dept: HEMATOLOGY/ONCOLOGY | Facility: CLINIC | Age: 67
End: 2022-06-14
Payer: MEDICARE

## 2022-06-14 NOTE — ASSESSMENT & PLAN NOTE
- Tylenol 650 mg Q6 PRN as needed for pain   06/13/22 2100   Mental Status   Mental Status Assessment X   Level of Consciousness 3   Attention 1;2   Motor Behavior-Retardation 1;2   Change in Mental Status 0   Memory Inability to learn new material   Perceptual Misinterpretations/Hallucinations   (Patient denies)   Sleep/Wake Cycle Interrupted sleep   Affect Anxious;Depressed;Flat   Behavior Poor eye contact;Suicidal/suicidal ideation   Attention (calculation) 1   Cognitive Content Perservation/Rumination;Poor historian   Cognitive Process Circumstantial;Poverty of ideas   Insight Poor   Judgement Poor   Reliability Appears to minimize   Mood Depressed   Appearance/Dress Disheveled appearance

## 2022-06-15 DIAGNOSIS — M85.89 OSTEOPENIA OF MULTIPLE SITES: Primary | ICD-10-CM

## 2022-07-18 ENCOUNTER — TELEPHONE (OUTPATIENT)
Dept: HEMATOLOGY/ONCOLOGY | Facility: CLINIC | Age: 67
End: 2022-07-18
Payer: MEDICARE

## 2022-07-18 NOTE — TELEPHONE ENCOUNTER
Spoke to patient about rescheduling due to provider out. Patient verbalized understanding of appt at 8 with Dr Martins on the 7/19 and thanked nurse

## 2022-07-19 ENCOUNTER — OFFICE VISIT (OUTPATIENT)
Dept: HEMATOLOGY/ONCOLOGY | Facility: CLINIC | Age: 67
End: 2022-07-19
Payer: MEDICARE

## 2022-07-19 VITALS
WEIGHT: 106.69 LBS | OXYGEN SATURATION: 92 % | HEIGHT: 62 IN | HEART RATE: 72 BPM | SYSTOLIC BLOOD PRESSURE: 140 MMHG | RESPIRATION RATE: 17 BRPM | DIASTOLIC BLOOD PRESSURE: 64 MMHG | TEMPERATURE: 97 F | BODY MASS INDEX: 19.63 KG/M2

## 2022-07-19 DIAGNOSIS — M85.89 OSTEOPENIA OF MULTIPLE SITES: ICD-10-CM

## 2022-07-19 DIAGNOSIS — C50.411 CARCINOMA OF UPPER-OUTER QUADRANT OF RIGHT BREAST IN FEMALE, ESTROGEN RECEPTOR POSITIVE: Primary | ICD-10-CM

## 2022-07-19 DIAGNOSIS — Z17.0 CARCINOMA OF UPPER-OUTER QUADRANT OF RIGHT BREAST IN FEMALE, ESTROGEN RECEPTOR POSITIVE: Primary | ICD-10-CM

## 2022-07-19 PROCEDURE — 1126F AMNT PAIN NOTED NONE PRSNT: CPT | Mod: CPTII,S$GLB,, | Performed by: INTERNAL MEDICINE

## 2022-07-19 PROCEDURE — 99215 OFFICE O/P EST HI 40 MIN: CPT | Mod: S$GLB,,, | Performed by: INTERNAL MEDICINE

## 2022-07-19 PROCEDURE — 3077F PR MOST RECENT SYSTOLIC BLOOD PRESSURE >= 140 MM HG: ICD-10-PCS | Mod: CPTII,S$GLB,, | Performed by: INTERNAL MEDICINE

## 2022-07-19 PROCEDURE — 1101F PT FALLS ASSESS-DOCD LE1/YR: CPT | Mod: CPTII,S$GLB,, | Performed by: INTERNAL MEDICINE

## 2022-07-19 PROCEDURE — 99499 UNLISTED E&M SERVICE: CPT | Mod: S$GLB,,, | Performed by: INTERNAL MEDICINE

## 2022-07-19 PROCEDURE — 3078F DIAST BP <80 MM HG: CPT | Mod: CPTII,S$GLB,, | Performed by: INTERNAL MEDICINE

## 2022-07-19 PROCEDURE — 1159F MED LIST DOCD IN RCRD: CPT | Mod: CPTII,S$GLB,, | Performed by: INTERNAL MEDICINE

## 2022-07-19 PROCEDURE — 3288F PR FALLS RISK ASSESSMENT DOCUMENTED: ICD-10-PCS | Mod: CPTII,S$GLB,, | Performed by: INTERNAL MEDICINE

## 2022-07-19 PROCEDURE — 99999 PR PBB SHADOW E&M-EST. PATIENT-LVL V: ICD-10-PCS | Mod: PBBFAC,,, | Performed by: INTERNAL MEDICINE

## 2022-07-19 PROCEDURE — 3078F PR MOST RECENT DIASTOLIC BLOOD PRESSURE < 80 MM HG: ICD-10-PCS | Mod: CPTII,S$GLB,, | Performed by: INTERNAL MEDICINE

## 2022-07-19 PROCEDURE — 4010F ACE/ARB THERAPY RXD/TAKEN: CPT | Mod: CPTII,S$GLB,, | Performed by: INTERNAL MEDICINE

## 2022-07-19 PROCEDURE — 3288F FALL RISK ASSESSMENT DOCD: CPT | Mod: CPTII,S$GLB,, | Performed by: INTERNAL MEDICINE

## 2022-07-19 PROCEDURE — 1101F PR PT FALLS ASSESS DOC 0-1 FALLS W/OUT INJ PAST YR: ICD-10-PCS | Mod: CPTII,S$GLB,, | Performed by: INTERNAL MEDICINE

## 2022-07-19 PROCEDURE — 4010F PR ACE/ARB THEARPY RXD/TAKEN: ICD-10-PCS | Mod: CPTII,S$GLB,, | Performed by: INTERNAL MEDICINE

## 2022-07-19 PROCEDURE — 3077F SYST BP >= 140 MM HG: CPT | Mod: CPTII,S$GLB,, | Performed by: INTERNAL MEDICINE

## 2022-07-19 PROCEDURE — 3008F BODY MASS INDEX DOCD: CPT | Mod: CPTII,S$GLB,, | Performed by: INTERNAL MEDICINE

## 2022-07-19 PROCEDURE — 1126F PR PAIN SEVERITY QUANTIFIED, NO PAIN PRESENT: ICD-10-PCS | Mod: CPTII,S$GLB,, | Performed by: INTERNAL MEDICINE

## 2022-07-19 PROCEDURE — 99215 PR OFFICE/OUTPT VISIT, EST, LEVL V, 40-54 MIN: ICD-10-PCS | Mod: S$GLB,,, | Performed by: INTERNAL MEDICINE

## 2022-07-19 PROCEDURE — 99999 PR PBB SHADOW E&M-EST. PATIENT-LVL V: CPT | Mod: PBBFAC,,, | Performed by: INTERNAL MEDICINE

## 2022-07-19 PROCEDURE — 1159F PR MEDICATION LIST DOCUMENTED IN MEDICAL RECORD: ICD-10-PCS | Mod: CPTII,S$GLB,, | Performed by: INTERNAL MEDICINE

## 2022-07-19 PROCEDURE — 99499 RISK ADDL DX/OHS AUDIT: ICD-10-PCS | Mod: S$GLB,,, | Performed by: INTERNAL MEDICINE

## 2022-07-19 PROCEDURE — 3008F PR BODY MASS INDEX (BMI) DOCUMENTED: ICD-10-PCS | Mod: CPTII,S$GLB,, | Performed by: INTERNAL MEDICINE

## 2022-07-19 NOTE — PLAN OF CARE
START ON PATHWAY REGIMEN - Breast    [id number]        Docetaxel (Taxotere)       Cyclophosphamide           Additional Orders: Premedicate with dexamethasone 8 mg PO bid for three   days beginning 1 day prior to therapy    **Always confirm dose/schedule in your pharmacy ordering system**    Patient Characteristics:  Postoperative without Neoadjuvant Therapy (Pathologic Staging), Invasive   Disease, Adjuvant Therapy, HER2 Negative/Unknown/Equivocal, ER Positive, Node   Negative, pT1a-c, pN0/N1mi or pT2 or Higher, pN0, Oncotype High Risk (? 26)  Therapeutic Status: Postoperative without Neoadjuvant Therapy (Pathologic   Staging)  AJCC Grade: GX  AJCC N Category: pNX  AJCC M Category: cM0  ER Status: Positive (+)  AJCC 8 Stage Grouping: IA  HER2 Status: Negative (-)  Oncotype Dx Recurrence Score: 28  AJCC T Category: pTX  WV Status: Negative (-)  Adjuvant Therapy Status: No Adjuvant Therapy Received Yet or Changing Initial   Adjuvant Regimen due to Tolerance  Has this patient completed genomic testing<= Yes - Oncotype DX(R)  Intent of Therapy:  Curative Intent, Discussed with Patient

## 2022-07-19 NOTE — PROGRESS NOTES
CONSULT NOTE    Subjective:       Patient ID: Bhavna Landry is a 66 y.o. female.  MRN: [id number]  : 1955    Chief Complaint: No chief complaint on file.  pT1b N0 (i+)  grade 2 ER + ME - HER2 - IDC of the right breast.    History of Present Illness:   Bhavna Landry is a 66 y.o. female who is referred for right breast IDC.     She presented for screening mammogram on 2022 . This identified an asymmetry in the right breast. Follow-up mammogram and ultrasound on 2022 showed a 1 x 0.9 x 0.8 cm mass at the 9 o'clock position of the right breast 3 cm from the nipple. A ultrasound guided biopsy was performed on 2022 with pathology revealing invasive ductal carcinoma of the breast.     She saw Dr. Dhillon and underwent right mastectomy and sentinel node exam.  It showed IDC 8 mm, grade 2, ER strongly positive, ME negative, HER2/jayla 1+, Ki-67 12%.  There was also DCIS, multiple foci up to 8 mm, spanning a distance of 20 mm.  Nuclear grade 2. Two sentinel nodes removed, 1 had isolated tumor cells.  Oncotype DX RS 28; Chemo benefit >15%     She presents today for review of oncotype. She is accompanied by her daughter      GYN History:  Age of menarche was 12. Menopause at 30 when she had a hysterectomy.  Ovaries in place.  Patient denies hormonal therapy. Patient is . Age of first live birth was 22.    FH:  Both parents had cancer, later in life. Mother had colon cancer, father had lymphoma?    Oncology History:  1. Breast, right, mastectomy:   - Invasive ductal carcinoma       - Greatest dimension:  8 mm       - Jonathan-Alaniz modified SBR score 3 + 2 + 1 = 6 of 9       - Histologic grade 2 of 3       - Mitotic index = 0.2 (average mitoses per high power field) (low)       - Resection margin free of invasive carcinoma           - Invasive carcinoma present 9 mm to closest posterior margin, 26 mm   to inferior margin, and 45 mm to superior margin   -  "Biopsy clip present   - No lymphovascular invasion is identified   - Ductal carcinoma in situ       - Foci up to 8 mm in greatest dimension spanning a distance of   approximately 20 mm       - Nuclear grade ranges from 2 to 3 of 3       - Solid and cribriform patterns       - Central comedonecrosis present:  Approximately 80%       - Resection margin free of DCIS           - DCIS present present 9 mm to closest posterior margin, 26 mm to   inferior margin, and 45 mm to superior margin   - Background breast contains usual ductal hyperplasia, apocrine metaplasia,   microcysts and macrocysts, ectatic ducts, and stromal fibrosis   - Microcalcifications, calcium phosphate type, associated with DCIS and   focally with medial calcific arterial stenosis (Monckeberg's sclerosis)   - See CAP Tumor Synoptic   2. [place] lymph node, right axillary, "#1 hot and blue count 2004,"   excision:   - 1 lymph node, isolated tumor cells present   3. [place] lymph node, right axillary, "#2 hot and blue count 1700,"   excision:   - 1 lymph node, negative for metastatic carcinoma   CAP Tumor Synoptic:   Procedure:  Total mastectomy   Specimen laterality:  Right   Tumor site:  9 o'clock   Histologic type:  Invasive carcinoma of no special type (ductal)   Histologic grade (Shelly histologic score):  Milton score 3 + 2 + 1 =   6 of 9   Glandular (acinar) / tubular differentiation:  Score 3   Nuclear pleomorphism:  Score 2   Mitotic grade:  Score 1   Overall grade:  Grade 2   Tumor size:  8 mm   Tumor focality:  Single focus of invasive carcinoma   Ductal carcinoma in situ (DCIS):  Present (Negative for extensive intraductal   component)       Size (extent) of DCIS:  Foci up to 8 mm in greatest dimension spanning a   distance of approximately 20 mm       Number blocks with DCIS:  4       Number blocks examined:  13       Architectural patterns:  Comedo, cribriform, solid       Nuclear grade:  Ranges from grade 2 (intermediate) to " grade 3 (high)   Lobular carcinoma in situ (LCIS):  Not identified   Tumor extent:  Not applicable   Lymphovascular invasion:  Not identified   Dermal lymphovascular invasion:  Not identified   Microcalcifications:  Present in DCIS and nonneoplastic tissue   Treatment effect in the breast:  No known pre-surgical therapy   Treatment effect in the lymph nodes:  Not applicable   Margin status for invasive carcinoma:  All margins negative for invasive   carcinoma       Distance from invasive carcinoma closest posterior margin:  9 mm       Distance from invasive carcinoma to inferior margin:  26 mm       Distance from invasive carcinoma to superior margin:  45 mm   Margin status for DCIS:  All margins negative for DCIS       Distant from DCIS to closest posterior margin:  9 mm       Distance from DCIS to inferior margin:  26 mm       Distance from DCIS to superior margin:  45 mm   Regional lymph node status:  Tumor present in regional lymph nodes   Number of lymph nodes with macrometastases:  0   Number of lymph nodes with micrometastases:  0   Number of lymph nodes with isolated tumor cells:  1   Size of largest jose l metastatic deposit:  0.13 mm   Extranodal extension:  Not identified   Total number of lymph nodes examined (sentinel and nonsentinel):  2   Number of sentinel nodes examined:  2   Distant sites involved:  Not applicable   TNM descriptors:  Not applicable   pT category:  pT1b   Regional lymph nodes modifier:  (sn)   pN category:  (sn) pN0 (i+)   pM category:  Not applicable - pM cannot be determined from the submitted   specimens   Special studies:  Biomarkers, assessed by immunohistochemistry on prior right   breast biopsy (S-) with following reported results:       - Estrogen Receptor (ER):  Positive (100%; strong intensity)       - Progesterone Receptor (OR):  Negative (<1%)       - HER2:  Negative (1+)       - Ki-67 proliferation index:  12% (low)   Oncology History   Carcinoma of upper-outer  quadrant of right breast in female, estrogen receptor positive   2022 Initial Diagnosis    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, estrogen receptor positive     2022 Cancer Staged    Staging form: Breast, AJCC 8th Edition  - Clinical stage from 2022: Stage IA (cT1b, cN0, cM0, G2, ER+, OH-, HER2-)         History:  Past Medical History:   Diagnosis Date    Allergy     Amblyopia     Anemia     Anticoagulant long-term use     Arthritis 1992    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, estrogen receptor positive 2022    Cataract     Depression     Dry eyes     Dry mouth     Duane's syndrome of right eye     Fibromyalgia 2014    Fibromyalgia     Fractured hip     RIGHT HIP    GERD (gastroesophageal reflux disease)     History of psychiatric hospitalization     Hyperlipidemia 1992    Hypertension     Hypocalcemia     Hyponatremia     Kidney stone     Migraine headache     Osteoporosis     Psoriatic arthritis 1992    Right knee pain     post knee replacement surgery (possible rejectiion of metal)    RLS (restless legs syndrome)     Schizophrenia 1992    stable on meds    Squamous cell carcinoma of skin     Urinary tract infection       Past Surgical History:   Procedure Laterality Date    brow ptosis repair Right 2019    Surgeon: Dr. Karla Henson    CATARACT EXTRACTION       SECTION      COLONOSCOPY N/A 2016    Procedure: COLONOSCOPY;  Surgeon: ANDRIA Connelly MD;  Location: [address]);  Service: Endoscopy;  Laterality: N/A;    cyst removed from right sinus  1982    HYSTERECTOMY      VAGINAL HYSTERECTOMY WITHOUT BSO - ENDOMETRIOSIS    INJECTION FOR SENTINEL NODE IDENTIFICATION Right 2022    Procedure: INJECTION, FOR SENTINEL NODE IDENTIFICATION-Right;  Surgeon: NEAL Dhillon MD;  Location: [facility];  Service: General;  Laterality: Right;    INJECTION OF  ANESTHETIC AGENT AROUND MEDIAL BRANCH NERVES INNERVATING LUMBAR FACET JOINT N/A 4/11/2019    Procedure: Lumbo-sacral Block, DR5 and Lateral Branches of S1,S2, S3;  Surgeon: Michelle Pineda Jr., MD;  Location: [facility];  Service: Pain Management;  Laterality: N/A;  Pt takes  and states she holds ASA on her own whenever she has procedures.  Instructed to hold x 3 days prior to procedure.      INJECTION OF ANESTHETIC AGENT INTO SACROILIAC JOINT Right 8/30/2018    Procedure: BLOCK, SACROILIAC JOINT-Right- ORAL SEDATION;  Surgeon: Michelle Pineda Jr., MD;  Location: [facility];  Service: Pain Management;  Laterality: Right;    INJECTION OF ANESTHETIC AGENT INTO SACROILIAC JOINT Bilateral 9/27/2018    Procedure: BLOCK, SACROILIAC JOINT-BILATERAL;  Surgeon: Michelle Pineda Jr., MD;  Location: [facility];  Service: Pain Management;  Laterality: Bilateral;  Please keep at 10:00 due to trasnsportation    INJECTION OF ANESTHETIC AGENT INTO SACROILIAC JOINT Bilateral 2/7/2019    Procedure: Bilateral Sacroiliac Joint Injection - Per Dr Pineda, not necessary to hold ASA.;  Surgeon: Michelle Pineda Jr., MD;  Location: [facility];  Service: Pain Management;  Laterality: Bilateral;    INTRAOCULAR PROSTHESES INSERTION Right 8/1/2019    Procedure: INSERTION, IOL PROSTHESIS;  Surgeon: Karen Song MD;  Location: [address];  Service: Ophthalmology;  Laterality: Right;    INTRAOCULAR PROSTHESES INSERTION Left 9/26/2019    Procedure: INSERTION, IOL PROSTHESIS;  Surgeon: Karen Song MD;  Location: [address];  Service: Ophthalmology;  Laterality: Left;    JOINT REPLACEMENT Right     knee    KNEE SURGERY      MASTECTOMY Right 5/9/2022    Procedure: MASTECTOMY-Right;  Surgeon: NEAL Dhillon MD;  Location: [facility];  Service: General;  Laterality: Right;  2.5 HOURS    PHACOEMULSIFICATION OF CATARACT Right 8/1/2019    Procedure: PHACOEMULSIFICATION, CATARACT;  Surgeon: Karen NO  MD Duncan;  Location: [address];  Service: Ophthalmology;  Laterality: Right;    PHACOEMULSIFICATION OF CATARACT Left 9/26/2019    Procedure: PHACOEMULSIFICATION, CATARACT;  Surgeon: Karen Song MD;  Location: [facility] OR [address] [place] [address];  Service: Ophthalmology;  Laterality: Left;    RADIOFREQUENCY THERMOCOAGULATION Right 5/7/2019    Procedure: RADIOFREQUENCY THERMAL COAGULATION RIGHT DORSAL RAMUS 5 AND LATERAL BRANCH OF S1, S2 AND S3;  Surgeon: Michelle Pineda Jr., MD;  Location: [facility];  Service: Pain Management;  Laterality: Right;    RADIOFREQUENCY THERMOCOAGULATION Left 5/14/2019    Procedure: RADIOFREQUENCY THERMAL COAGULATION LEFT DORSAL RAMUS 5 AND LATERAL BRANCH OF S1,S2 AND S3;  Surgeon: Michelle Pineda Jr., MD;  Location: [facility];  Service: Pain Management;  Laterality: Left;    RADIOFREQUENCY THERMOCOAGULATION Right 10/22/2019    Procedure: RADIOFREQUENCY THERMAL COAGULATION---DARSAL RAMUS 5 and LATERAL S1,S2,and S3 Right;  Surgeon: Michelle Pineda Jr., MD;  Location: [facility];  Service: Pain Management;  Laterality: Right;  patient to sign consent DOS    RADIOFREQUENCY THERMOCOAGULATION Left 10/29/2019    Procedure: RADIOFREQUENCY THERMAL COAGULATION - LEFT - DR5, S1,S2, AND S3;  Surgeon: Michelle Pineda Jr., MD;  Location: [facility];  Service: Pain Management;  Laterality: Left;    RADIOFREQUENCY THERMOCOAGULATION Left 5/26/2020    Procedure: RADIOFREQUENCY THERMAL COAGULATION--Left DR5+ lateral branches of S1, S2, S3;  Surgeon: Michelle Pineda Jr., MD;  Location: [facility];  Service: Pain Management;  Laterality: Left;    RADIOFREQUENCY THERMOCOAGULATION Right 6/2/2020    Procedure: RADIOFREQUENCY THERMAL COAGULATION--Right DR5+ lateral branches of S1, S2, S3;  Surgeon: Michelle Pineda Jr., MD;  Location: [facility];  Service: Pain Management;  Laterality: Right;    SENTINEL LYMPH NODE BIOPSY Right 5/9/2022    Procedure: BIOPSY, LYMPH NODE,  SENTINEL-Right;  Surgeon: NEAL Dhillon MD;  Location: [facility];  Service: General;  Laterality: Right;    Surgery on right knee  07/09/1982    tumor removed from back left side upper shoulder  03/17/2006     Family History   Problem Relation Age of Onset    Colon cancer Mother     Psoriasis Mother     Cancer Mother         colon    Depression Mother     Cancer Father         lymphoma    Stroke Sister     Diabetes Brother     Arthritis Brother     Heart disease Brother         cad    No Known Problems Daughter     Hypertension Neg Hx     Suicide Neg Hx     Amblyopia Neg Hx     Blindness Neg Hx     Cataracts Neg Hx     Glaucoma Neg Hx     Macular degeneration Neg Hx     Retinal detachment Neg Hx     Strabismus Neg Hx       Social History     Tobacco Use    Smoking status: Current Every Day Smoker     Packs/day: 0.25     Years: 10.00     Pack years: 2.50     Types: Cigarettes    Smokeless tobacco: Former User    Tobacco comment: about 5 cig a day   Substance and Sexual Activity    Alcohol use: No    Drug use: No    Sexual activity: Not Currently     Partners: Male     Birth control/protection: Post-menopausal        ROS:   Review of Systems   Constitutional: Negative for fever, malaise/fatigue and weight loss.   HENT: Negative for congestion, ear pain, nosebleeds and sore throat.    Eyes: Negative for blurred vision, double vision and photophobia.   Respiratory: Negative for cough, hemoptysis, sputum production, shortness of breath, wheezing and stridor.    Cardiovascular: Negative for chest pain, palpitations, orthopnea and leg swelling.   Gastrointestinal: Negative for abdominal pain, blood in stool, constipation, diarrhea, heartburn, melena, nausea and vomiting.   Genitourinary: Negative for dysuria and hematuria.   Musculoskeletal: Negative for back pain, myalgias and neck pain.   Skin: Negative for itching and rash.   Neurological: Negative for dizziness, focal weakness, seizures,  weakness and headaches.   Endo/Heme/Allergies: Negative for polydipsia. Does not bruise/bleed easily.   Psychiatric/Behavioral: Positive for depression. Negative for memory loss. The patient is nervous/anxious. The patient does not have insomnia.         Objective:     There were no vitals filed for this visit.    Physical Examination:   Physical Exam  Vitals and nursing note reviewed.   Constitutional:       General: She is not in acute distress.     Appearance: She is not diaphoretic.   HENT:      Head: Normocephalic.      Mouth/Throat:      Pharynx: No oropharyngeal exudate.   Eyes:      General: No scleral icterus.     Conjunctiva/sclera: Conjunctivae normal.   Neck:      Thyroid: No thyromegaly.   Cardiovascular:      Rate and Rhythm: Normal rate and regular rhythm.      Heart sounds: Normal heart sounds. No murmur heard.  Pulmonary:      Effort: Pulmonary effort is normal. No respiratory distress.      Breath sounds: No stridor. No wheezing or rales.   Chest:      Chest wall: No tenderness.   Abdominal:      General: Bowel sounds are normal. There is no distension.      Palpations: Abdomen is soft. There is no mass.      Tenderness: There is no abdominal tenderness. There is no rebound.   Musculoskeletal:         General: No tenderness or deformity. Normal range of motion.      Cervical back: Neck supple.   Lymphadenopathy:      Cervical: No cervical adenopathy.   Skin:     General: Skin is warm and dry.      Findings: No erythema or rash.      Comments: Right mastectomy without reconstruction.  No abnormal breast mass, skin, nipple changes or axillary adenopathy on the left side.   Neurological:      Mental Status: She is alert and oriented to person, place, and time.      Cranial Nerves: No cranial nerve deficit.      Coordination: Coordination normal.      Gait: Gait is intact.   Psychiatric:         Mood and Affect: Affect normal.         Cognition and Memory: Memory normal.         Judgment: Judgment  normal.          Diagnostic Tests:  Significant Imaging: I have reviewed and interpreted all pertinent imaging results/findings.        Laboratory Data:  All pertinent labs have been reviewed.    Labs:   Lab Results   Component Value Date    WBC 8.25 04/13/2022    HGB 13.2 04/13/2022    HCT 40.8 04/13/2022    MCV 98 04/13/2022     04/13/2022       Assessment/Plan:   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, estrogen receptor positive  pT1b N0 (i+)  grade 2 ER + OH - HER2 - IDC of the right breast.    She is status post mastectomy and sentinel node exam for IDC as well as DCIS of the right breast. Presents today for oncotype results  Oncotype returned high risk, which confers a benefit ot the addition of chemotherapy to AI.   RSclin shows an 8% risk of recurrence with 4% absolute benefit of chemotherapy.     We discussed adjuvant treatment with Taxotere 75mg/m2 and Cytoxan 600mg/m2 q3wk x4. Discussed the results of Joint Analysis of ABC Trials presented 6/4/16 at [facility]. In this trial patients with node negative disease had a 2-2.5% relative risk reduction in using AC->T compared with TC, making TC a standard of care option when weighing risks vs benefits.     Overall side effects of chemotherapy were discussed including alopecia (loss of hair),  immunosuppression, Neutropenia (low white count), anemia (low hemoglobin), thrombocytopenia (lowered platelets), and neuropathy (especially of the fingers and toes) from Taxotere. Other side effects such as nail changes, dry and itchy eyes, mouth sores, and bone pain were discussed. The need for growth factor support was also discussed. Neulasta related bone pain was discussed. Rare but serious side effects such as increased risk of secondary leukemia with Cytoxan were also discussed as well.     She states that she would like time to think about this prior to making a decision.   Will plan to submit treatment and IR port placement when she is  deciding    Osteopenia of multiple sites  Diagnosed with osteopenia/osteoporosis given hip fracture in 2020. Follows up with Dr. Matias at and is on zoledronic acid, most recent does in March 2022.   -     Bone Density Spine And Hip; Future; Expected date: 06/07/2022       ECOG SCORE             Discussion:   No follow-ups on file.    Plan was discussed with the patient at length, and she verbalized understanding. Bhavna was given an opportunity to ask questions that were answered to her satisfaction, and she was advised to call in the interval if any problems or questions arise.    Electronically signed by Idania Martins MD    MDM includes  :    - Acute or chronic illness or injury that poses a threat to life or bodily function  - Review of prior external notes from unique source  - Independent review and explanation of 3 results from unique tests  - Discussion of management and ordering 1 unique tests  - Extensive discussion of treatment and management  - Prescription drug management  - Drug therapy requiring intensive monitoring for toxicity        Route Chart for Scheduling    Med Onc Chart Routing  Urgent    Follow up with physician 2 weeks. With Dr Marcelino to discuss chemo prefers ~10am   Follow up with [facility]    Infusion scheduling note    Injection scheduling note    Labs    Imaging    Pharmacy appointment    Other referrals            Therapy Plan Information  Pre-Medications  acetaminophen tablet 650 mg  650 mg, Oral, Every visit  Medications  zoledronic acid-mannitol & water 5 mg/100 mL infusion 5 mg  5 mg, Intravenous, Every visit  Flushes  sodium chloride 0.9% 250 mL flush bag  Intravenous, Every visit  sodium chloride 0.9% flush 10 mL  10 mL, Intravenous, Every visit  heparin, porcine (PF) 100 unit/mL injection flush 500 Units  500 Units, Intravenous, Every visit  alteplase injection 2 mg  2 mg, Intra-Catheter, Every visit

## 2022-07-20 ENCOUNTER — TELEPHONE (OUTPATIENT)
Dept: HEMATOLOGY/ONCOLOGY | Facility: CLINIC | Age: 67
End: 2022-07-20
Payer: MEDICARE

## 2022-07-20 NOTE — TELEPHONE ENCOUNTER
----- Message from Tereza Harding RN sent at 7/20/2022 11:55 AM CDT -----  Regarding: FW: Bone Density test  Contact: [phone]  Needs dexa scan  ----- Message -----  From: Frida Brennan  Sent: 7/20/2022  11:48 AM CDT  To: Chari Pinto ([facility]) Staff  Subject: Bone Density test                                Patient is requesting orders for a bone density test  sent to [facility].   Would the patient rather a call back or a response via [facility]?  call  Best Call Back Number:  [phone]  Additional Information:

## 2022-07-21 ENCOUNTER — PATIENT MESSAGE (OUTPATIENT)
Dept: HEMATOLOGY/ONCOLOGY | Facility: CLINIC | Age: 67
End: 2022-07-21
Payer: MEDICARE

## 2022-07-22 ENCOUNTER — TELEPHONE (OUTPATIENT)
Dept: HEMATOLOGY/ONCOLOGY | Facility: CLINIC | Age: 67
End: 2022-07-22
Payer: MEDICARE

## 2022-07-22 ENCOUNTER — HOSPITAL ENCOUNTER (OUTPATIENT)
Dept: RADIOLOGY | Facility: HOSPITAL | Age: 67
Discharge: HOME | End: 2022-07-22
Attending: INTERNAL MEDICINE
Payer: MEDICARE

## 2022-07-22 ENCOUNTER — DOCUMENTATION ONLY (OUTPATIENT)
Dept: HEMATOLOGY/ONCOLOGY | Facility: CLINIC | Age: 67
End: 2022-07-22
Payer: MEDICARE

## 2022-07-22 DIAGNOSIS — M85.89 OSTEOPENIA OF MULTIPLE SITES: ICD-10-CM

## 2022-07-22 PROCEDURE — 77080 DEXA BONE DENSITY SPINE HIP: ICD-10-PCS | Mod: 26,,, | Performed by: RADIOLOGY

## 2022-07-22 PROCEDURE — 77080 DXA BONE DENSITY AXIAL: CPT | Mod: TC

## 2022-07-22 PROCEDURE — 77080 DXA BONE DENSITY AXIAL: CPT | Mod: 26,,, | Performed by: RADIOLOGY

## 2022-07-22 NOTE — PROGRESS NOTES
Spoke with pt regarding scheduling her for chemo education class.  She reports to me that she has decided not to do chemo.  I sent a note to Dr Martins.  Provided pt with my direct number and encouraged her to call for any assistance.  Pt verbalized understanding.

## 2022-07-22 NOTE — TELEPHONE ENCOUNTER
Discussed chemo vs endocrine therapy with patient. RSclin was discussed. Patient called [facility] today and chose not to move forward with chemo. She will follow up with Dr Marcelino to start AI    ----- Message from [facility] Treadwell RN sent at 7/22/2022  3:14 PM CDT -----  Pt reports to me that she has decided not to do chemo, so no need for port or chemo ed.    ----- Message -----  From: Idania Martins MD  Sent: 7/19/2022   1:07 PM CDT  To: [facility] RN    May need chemo. Submitted ref for IR port

## 2022-07-27 ENCOUNTER — TELEPHONE (OUTPATIENT)
Dept: SURGERY | Facility: CLINIC | Age: 67
End: 2022-07-27
Payer: MEDICARE

## 2022-07-27 DIAGNOSIS — Z90.11 S/P MASTECTOMY, RIGHT: ICD-10-CM

## 2022-07-27 DIAGNOSIS — Z85.3 PERSONAL HISTORY OF BREAST CANCER: Primary | ICD-10-CM

## 2022-07-27 DIAGNOSIS — C50.911 INVASIVE DUCTAL CARCINOMA OF BREAST, FEMALE, RIGHT: ICD-10-CM

## 2022-07-27 NOTE — TELEPHONE ENCOUNTER
Patient called regarding her bra and prosthesis prescription. After speaking with the patient, she informed me that it would need to be sent to [employer] for authorization. Orders placed and faxed to [employer] and [facility]. All questions answered at this time.

## 2022-08-02 ENCOUNTER — OFFICE VISIT (OUTPATIENT)
Dept: HEMATOLOGY/ONCOLOGY | Facility: CLINIC | Age: 67
End: 2022-08-02
Payer: MEDICARE

## 2022-08-02 VITALS
OXYGEN SATURATION: 99 % | HEART RATE: 74 BPM | RESPIRATION RATE: 18 BRPM | WEIGHT: 109.81 LBS | BODY MASS INDEX: 20.21 KG/M2 | DIASTOLIC BLOOD PRESSURE: 59 MMHG | HEIGHT: 62 IN | TEMPERATURE: 98 F | SYSTOLIC BLOOD PRESSURE: 133 MMHG

## 2022-08-02 DIAGNOSIS — M85.89 OSTEOPENIA OF MULTIPLE SITES: ICD-10-CM

## 2022-08-02 DIAGNOSIS — Z17.0 CARCINOMA OF UPPER-OUTER QUADRANT OF RIGHT BREAST IN FEMALE, ESTROGEN RECEPTOR POSITIVE: Primary | ICD-10-CM

## 2022-08-02 DIAGNOSIS — C50.411 CARCINOMA OF UPPER-OUTER QUADRANT OF RIGHT BREAST IN FEMALE, ESTROGEN RECEPTOR POSITIVE: Primary | ICD-10-CM

## 2022-08-02 DIAGNOSIS — Z79.811 USE OF ANASTROZOLE: ICD-10-CM

## 2022-08-02 PROCEDURE — 3078F DIAST BP <80 MM HG: CPT | Mod: CPTII,S$GLB,, | Performed by: INTERNAL MEDICINE

## 2022-08-02 PROCEDURE — 99499 RISK ADDL DX/OHS AUDIT: ICD-10-PCS | Mod: S$GLB,,, | Performed by: INTERNAL MEDICINE

## 2022-08-02 PROCEDURE — 1160F PR REVIEW ALL MEDS BY PRESCRIBER/CLIN PHARMACIST DOCUMENTED: ICD-10-PCS | Mod: CPTII,S$GLB,, | Performed by: INTERNAL MEDICINE

## 2022-08-02 PROCEDURE — 4010F ACE/ARB THERAPY RXD/TAKEN: CPT | Mod: CPTII,S$GLB,, | Performed by: INTERNAL MEDICINE

## 2022-08-02 PROCEDURE — 3075F PR MOST RECENT SYSTOLIC BLOOD PRESS GE 130-139MM HG: ICD-10-PCS | Mod: CPTII,S$GLB,, | Performed by: INTERNAL MEDICINE

## 2022-08-02 PROCEDURE — 1159F PR MEDICATION LIST DOCUMENTED IN MEDICAL RECORD: ICD-10-PCS | Mod: CPTII,S$GLB,, | Performed by: INTERNAL MEDICINE

## 2022-08-02 PROCEDURE — 3078F PR MOST RECENT DIASTOLIC BLOOD PRESSURE < 80 MM HG: ICD-10-PCS | Mod: CPTII,S$GLB,, | Performed by: INTERNAL MEDICINE

## 2022-08-02 PROCEDURE — 99417 PROLNG OP E/M EACH 15 MIN: CPT | Mod: S$GLB,,, | Performed by: INTERNAL MEDICINE

## 2022-08-02 PROCEDURE — 1126F AMNT PAIN NOTED NONE PRSNT: CPT | Mod: CPTII,S$GLB,, | Performed by: INTERNAL MEDICINE

## 2022-08-02 PROCEDURE — 99999 PR PBB SHADOW E&M-EST. PATIENT-LVL IV: ICD-10-PCS | Mod: PBBFAC,,, | Performed by: INTERNAL MEDICINE

## 2022-08-02 PROCEDURE — 1126F PR PAIN SEVERITY QUANTIFIED, NO PAIN PRESENT: ICD-10-PCS | Mod: CPTII,S$GLB,, | Performed by: INTERNAL MEDICINE

## 2022-08-02 PROCEDURE — 3008F BODY MASS INDEX DOCD: CPT | Mod: CPTII,S$GLB,, | Performed by: INTERNAL MEDICINE

## 2022-08-02 PROCEDURE — 4010F PR ACE/ARB THEARPY RXD/TAKEN: ICD-10-PCS | Mod: CPTII,S$GLB,, | Performed by: INTERNAL MEDICINE

## 2022-08-02 PROCEDURE — 3075F SYST BP GE 130 - 139MM HG: CPT | Mod: CPTII,S$GLB,, | Performed by: INTERNAL MEDICINE

## 2022-08-02 PROCEDURE — 1160F RVW MEDS BY RX/DR IN RCRD: CPT | Mod: CPTII,S$GLB,, | Performed by: INTERNAL MEDICINE

## 2022-08-02 PROCEDURE — 3008F PR BODY MASS INDEX (BMI) DOCUMENTED: ICD-10-PCS | Mod: CPTII,S$GLB,, | Performed by: INTERNAL MEDICINE

## 2022-08-02 PROCEDURE — 99417 PR PROLONGED SVC, OUTPT, W/WO DIRECT PT CONTACT,  EA ADDTL 15 MIN: ICD-10-PCS | Mod: S$GLB,,, | Performed by: INTERNAL MEDICINE

## 2022-08-02 PROCEDURE — 99999 PR PBB SHADOW E&M-EST. PATIENT-LVL IV: CPT | Mod: PBBFAC,,, | Performed by: INTERNAL MEDICINE

## 2022-08-02 PROCEDURE — 99215 PR OFFICE/OUTPT VISIT, EST, LEVL V, 40-54 MIN: ICD-10-PCS | Mod: S$GLB,,, | Performed by: INTERNAL MEDICINE

## 2022-08-02 PROCEDURE — 99499 UNLISTED E&M SERVICE: CPT | Mod: S$GLB,,, | Performed by: INTERNAL MEDICINE

## 2022-08-02 PROCEDURE — 99215 OFFICE O/P EST HI 40 MIN: CPT | Mod: S$GLB,,, | Performed by: INTERNAL MEDICINE

## 2022-08-02 PROCEDURE — 1159F MED LIST DOCD IN RCRD: CPT | Mod: CPTII,S$GLB,, | Performed by: INTERNAL MEDICINE

## 2022-08-02 RX ORDER — ANASTROZOLE 1 MG/1
1 TABLET ORAL DAILY
Qty: 30 TABLET | Refills: 3 | Status: SHIPPED | OUTPATIENT
Start: 2022-08-02 | End: 2022-11-28 | Stop reason: SDUPTHER

## 2022-08-02 NOTE — PROGRESS NOTES
CONSULT NOTE    Subjective:       Patient ID: Bhavna Landry is a 67 y.o. female.  MRN: [id number]  : 1955    Chief Complaint: 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  pT1b N0 (i+)  grade 2 ER + HI - HER2 - IDC of the right breast.    History of Present Illness:   Bhavna Landry is a 67 y.o. female who is referred for right breast IDC.     She presented for screening mammogram on 2022 . This identified an asymmetry in the right breast. Follow-up mammogram and ultrasound on 2022 showed a 1 x 0.9 x 0.8 cm mass at the 9 o'clock position of the right breast 3 cm from the nipple. A ultrasound guided biopsy was performed on 2022 with pathology revealing invasive ductal carcinoma of the breast.     She saw Dr. Dhillon and underwent right mastectomy and sentinel node exam.  It showed IDC 8 mm, grade 2, ER strongly positive, HI negative, HER2/jayla 1+, Ki-67 12%.  There was also DCIS, multiple foci up to 8 mm, spanning a distance of 20 mm.  Nuclear grade 2. Two sentinel nodes removed, 1 had isolated tumor cells.    Interim history:   She presents today with her daughter to discuss further recommendations.  Oncotype DX RS 28; Chemo benefit >15% Oncotype DX RS 28; Chemo benefit >15% .     She has discussed oncotype results with Dr. Martins during her last visit and has declined adjuvant chemotherapy.       Oncology History:  1. Breast, right, mastectomy:   - Invasive ductal carcinoma       - Greatest dimension:  8 mm       - Jonathan-Alaniz modified SBR score 3 + 2 + 1 = 6 of 9       - Histologic grade 2 of 3       - Mitotic index = 0.2 (average mitoses per high power field) (low)       - Resection margin free of invasive carcinoma           - Invasive carcinoma present 9 mm to closest posterior margin, 26 mm   to inferior margin, and 45 mm to superior margin   - Biopsy clip present   - No lymphovascular invasion is identified   - Ductal  "carcinoma in situ       - Foci up to 8 mm in greatest dimension spanning a distance of   approximately 20 mm       - Nuclear grade ranges from 2 to 3 of 3       - Solid and cribriform patterns       - Central comedonecrosis present:  Approximately 80%       - Resection margin free of DCIS           - DCIS present present 9 mm to closest posterior margin, 26 mm to   inferior margin, and 45 mm to superior margin   - Background breast contains usual ductal hyperplasia, apocrine metaplasia,   microcysts and macrocysts, ectatic ducts, and stromal fibrosis   - Microcalcifications, calcium phosphate type, associated with DCIS and   focally with medial calcific arterial stenosis (Monckeberg's sclerosis)   - See CAP Tumor Synoptic   2. [place] lymph node, right axillary, "#1 hot and blue count 2004,"   excision:   - 1 lymph node, isolated tumor cells present   3. [place] lymph node, right axillary, "#2 hot and blue count 1700,"   excision:   - 1 lymph node, negative for metastatic carcinoma   CAP Tumor Synoptic:   Procedure:  Total mastectomy   Specimen laterality:  Right   Tumor site:  9 o'clock   Histologic type:  Invasive carcinoma of no special type (ductal)   Histologic grade ([place] histologic score):  [place] score 3 + 2 + 1 =   6 of 9   Glandular (acinar) / tubular differentiation:  Score 3   Nuclear pleomorphism:  Score 2   Mitotic grade:  Score 1   Overall grade:  Grade 2   Tumor size:  8 mm   Tumor focality:  Single focus of invasive carcinoma   Ductal carcinoma in situ (DCIS):  Present (Negative for extensive intraductal   component)       Size (extent) of DCIS:  Foci up to 8 mm in greatest dimension spanning a   distance of approximately 20 mm       Number blocks with DCIS:  4       Number blocks examined:  13       Architectural patterns:  [place], cribriform, solid       Nuclear grade:  Ranges from grade 2 (intermediate) to grade 3 (high)   Lobular carcinoma in situ (LCIS):  Not identified   Tumor " extent:  Not applicable   Lymphovascular invasion:  Not identified   Dermal lymphovascular invasion:  Not identified   Microcalcifications:  Present in DCIS and nonneoplastic tissue   Treatment effect in the breast:  No known pre-surgical therapy   Treatment effect in the lymph nodes:  Not applicable   Margin status for invasive carcinoma:  All margins negative for invasive   carcinoma       Distance from invasive carcinoma closest posterior margin:  9 mm       Distance from invasive carcinoma to inferior margin:  26 mm       Distance from invasive carcinoma to superior margin:  45 mm   Margin status for DCIS:  All margins negative for DCIS       Distant from DCIS to closest posterior margin:  9 mm       Distance from DCIS to inferior margin:  26 mm       Distance from DCIS to superior margin:  45 mm   Regional lymph node status:  Tumor present in regional lymph nodes   Number of lymph nodes with macrometastases:  0   Number of lymph nodes with micrometastases:  0   Number of lymph nodes with isolated tumor cells:  1   Size of largest jose l metastatic deposit:  0.13 mm   Extranodal extension:  Not identified   Total number of lymph nodes examined (sentinel and nonsentinel):  2   Number of sentinel nodes examined:  2   Distant sites involved:  Not applicable   TNM descriptors:  Not applicable   pT category:  pT1b   Regional lymph nodes modifier:  (sn)   pN category:  (sn) pN0 (i+)   pM category:  Not applicable - pM cannot be determined from the submitted   specimens   Special studies:  Biomarkers, assessed by immunohistochemistry on prior right   breast biopsy (S-) with following reported results:       - Estrogen Receptor (ER):  Positive (100%; strong intensity)       - Progesterone Receptor (DE):  Negative (<1%)       - HER2:  Negative (1+)       - Ki-67 proliferation index:  12% (low)   Oncology History   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, estrogen receptor positive   4/13/2022  Initial Diagnosis    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, estrogen receptor positive     2022 Cancer Staged    Staging form: Breast, AJCC 8th Edition  - Clinical stage from 2022: Stage IA (cT1b, cN0, cM0, G2, ER+, TN-, HER2-)     2022 - 2022 Chemotherapy    Treatment Summary   Plan Name: OP BREAST TC - DOCETAXEL CYCLOPHOSPHAMIDE Q3W  Treatment Goal: Curative  Status: Inactive  Start Date:   End Date:   Provider: Idania Martins MD  Chemotherapy: cyclophosphamide 600 mg/m2 = 880 mg in sodium chloride 0.9% 250 mL chemo infusion, 600 mg/m2, Intravenous, Clinic/HOD 1 time, 0 of 4 cycles  DOCEtaxeL (TAXOTERE) 75 mg/m2 = 110 mg in sodium chloride 0.9% 250 mL chemo infusion, 75 mg/m2, Intravenous, Clinic/HOD 1 time, 0 of 4 cycles         History:  Past Medical History:   Diagnosis Date    Allergy     Amblyopia     Anemia     Anticoagulant long-term use     Arthritis 1992    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, estrogen receptor positive 2022    Cataract     Depression     Dry eyes     Dry mouth     Duane's syndrome of right eye     Fibromyalgia 2014    Fibromyalgia     Fractured hip     RIGHT HIP    GERD (gastroesophageal reflux disease)     History of psychiatric hospitalization     Hyperlipidemia 1992    Hypertension     Hypocalcemia     Hyponatremia     Kidney stone     Migraine headache     Osteoporosis     Psoriatic arthritis 1992    Right knee pain     post knee replacement surgery (possible rejectiion of metal)    RLS (restless legs syndrome)     Schizophrenia 1992    stable on meds    Squamous cell carcinoma of skin     Urinary tract infection       Past Surgical History:   Procedure Laterality Date    brow ptosis repair Right 2019    Surgeon: Dr. Karla Henson    CATARACT EXTRACTION       SECTION      COLONOSCOPY N/A 2016    Procedure: COLONOSCOPY;  Surgeon: ANDRIA  Amador Connelly MD;  Location: [facility] ENDO (4TH FLR);  Service: Endoscopy;  Laterality: N/A;    cyst removed from right sinus  07/09/1982    HYSTERECTOMY      VAGINAL HYSTERECTOMY WITHOUT BSO - ENDOMETRIOSIS    INJECTION FOR SENTINEL NODE IDENTIFICATION Right 5/9/2022    Procedure: INJECTION, FOR SENTINEL NODE IDENTIFICATION-Right;  Surgeon: NEAL Dhillon MD;  Location: [facility];  Service: General;  Laterality: Right;    INJECTION OF ANESTHETIC AGENT AROUND MEDIAL BRANCH NERVES INNERVATING LUMBAR FACET JOINT N/A 4/11/2019    Procedure: Lumbo-sacral Block, DR5 and Lateral Branches of S1,S2, S3;  Surgeon: Michelle Pineda Jr., MD;  Location: [facility]IN [facility];  Service: Pain Management;  Laterality: N/A;  Pt takes  and states she holds ASA on her own whenever she has procedures.  Instructed to hold x 3 days prior to procedure.      INJECTION OF ANESTHETIC AGENT INTO SACROILIAC JOINT Right 8/30/2018    Procedure: BLOCK, SACROILIAC JOINT-Right- ORAL SEDATION;  Surgeon: Michelle Pineda Jr., MD;  Location: [facility];  Service: Pain Management;  Laterality: Right;    INJECTION OF ANESTHETIC AGENT INTO SACROILIAC JOINT Bilateral 9/27/2018    Procedure: BLOCK, SACROILIAC JOINT-BILATERAL;  Surgeon: Michelle Pineda Jr., MD;  Location: [facility];  Service: Pain Management;  Laterality: Bilateral;  Please keep at 10:00 due to trasnsportation    INJECTION OF ANESTHETIC AGENT INTO SACROILIAC JOINT Bilateral 2/7/2019    Procedure: Bilateral Sacroiliac Joint Injection - Per Dr Pineda, not necessary to hold ASA.;  Surgeon: Michelle Pineda Jr., MD;  Location: [facility];  Service: Pain Management;  Laterality: Bilateral;    INTRAOCULAR PROSTHESES INSERTION Right 8/1/2019    Procedure: INSERTION, IOL PROSTHESIS;  Surgeon: Karen Song MD;  Location: [facility] 1ST FLR;  Service: Ophthalmology;  Laterality: Right;    INTRAOCULAR PROSTHESES INSERTION Left 9/26/2019    Procedure: INSERTION, IOL PROSTHESIS;   Surgeon: Karen Song MD;  Location: [address];  Service: Ophthalmology;  Laterality: Left;    JOINT REPLACEMENT Right     knee    KNEE SURGERY      MASTECTOMY Right 5/9/2022    Procedure: MASTECTOMY-Right;  Surgeon: NEAL Dhillon MD;  Location: [facility];  Service: General;  Laterality: Right;  2.5 HOURS    PHACOEMULSIFICATION OF CATARACT Right 8/1/2019    Procedure: PHACOEMULSIFICATION, CATARACT;  Surgeon: Karen Song MD;  Location: [address];  Service: Ophthalmology;  Laterality: Right;    PHACOEMULSIFICATION OF CATARACT Left 9/26/2019    Procedure: PHACOEMULSIFICATION, CATARACT;  Surgeon: Karen Song MD;  Location: [facility] OR [address] [place] [address];  Service: Ophthalmology;  Laterality: Left;    RADIOFREQUENCY THERMOCOAGULATION Right 5/7/2019    Procedure: RADIOFREQUENCY THERMAL COAGULATION RIGHT DORSAL RAMUS 5 AND LATERAL BRANCH OF S1, S2 AND S3;  Surgeon: Michelle Pineda Jr., MD;  Location: [facility];  Service: Pain Management;  Laterality: Right;    RADIOFREQUENCY THERMOCOAGULATION Left 5/14/2019    Procedure: RADIOFREQUENCY THERMAL COAGULATION LEFT DORSAL RAMUS 5 AND LATERAL BRANCH OF S1,S2 AND S3;  Surgeon: Michelle Pineda Jr., MD;  Location: [facility];  Service: Pain Management;  Laterality: Left;    RADIOFREQUENCY THERMOCOAGULATION Right 10/22/2019    Procedure: RADIOFREQUENCY THERMAL COAGULATION---DARSAL RAMUS 5 and LATERAL S1,S2,and S3 Right;  Surgeon: Michelle Pineda Jr., MD;  Location: [facility];  Service: Pain Management;  Laterality: Right;  patient to sign consent DOS    RADIOFREQUENCY THERMOCOAGULATION Left 10/29/2019    Procedure: RADIOFREQUENCY THERMAL COAGULATION - LEFT - DR5, S1,S2, AND S3;  Surgeon: Michelle Pineda Jr., MD;  Location: [facility];  Service: Pain Management;  Laterality: Left;    RADIOFREQUENCY THERMOCOAGULATION Left 5/26/2020    Procedure: RADIOFREQUENCY THERMAL COAGULATION--Left DR5+ lateral branches of S1, S2, S3;  Surgeon: Michelle MERRITT  Edwin Bergman MD;  Location: [facility];  Service: Pain Management;  Laterality: Left;    RADIOFREQUENCY THERMOCOAGULATION Right 6/2/2020    Procedure: RADIOFREQUENCY THERMAL COAGULATION--Right DR5+ lateral branches of S1, S2, S3;  Surgeon: Michelle Pineda Jr., MD;  Location: [facility];  Service: Pain Management;  Laterality: Right;    SENTINEL LYMPH NODE BIOPSY Right 5/9/2022    Procedure: BIOPSY, LYMPH NODE, SENTINEL-Right;  Surgeon: NEAL Dhillon MD;  Location: [facility];  Service: General;  Laterality: Right;    Surgery on right knee  07/09/1982    tumor removed from back left side upper shoulder  03/17/2006     Family History   Problem Relation Age of Onset    Colon cancer Mother     Psoriasis Mother     Cancer Mother         colon    Depression Mother     Cancer Father         lymphoma    Stroke Sister     Diabetes Brother     Arthritis Brother     Heart disease Brother         cad    No Known Problems Daughter     Hypertension Neg Hx     Suicide Neg Hx     Amblyopia Neg Hx     Blindness Neg Hx     Cataracts Neg Hx     Glaucoma Neg Hx     Macular degeneration Neg Hx     Retinal detachment Neg Hx     Strabismus Neg Hx       Social History     Tobacco Use    Smoking status: Current Every Day Smoker     Packs/day: 0.25     Years: 10.00     Pack years: 2.50     Types: Cigarettes    Smokeless tobacco: Former User    Tobacco comment: about 5 cig a day   Substance and Sexual Activity    Alcohol use: No    Drug use: No    Sexual activity: Not Currently     Partners: Male     Birth control/protection: Post-menopausal        ROS:   Review of Systems   Constitutional: Negative for fever, malaise/fatigue and weight loss.   HENT: Negative for congestion, ear pain, nosebleeds and sore throat.    Eyes: Negative for blurred vision, double vision and photophobia.   Respiratory: Negative for cough, hemoptysis, sputum production, shortness of breath, wheezing and stridor.    Cardiovascular:  "Negative for chest pain, palpitations, orthopnea and leg swelling.   Gastrointestinal: Negative for abdominal pain, blood in stool, constipation, diarrhea, heartburn, melena, nausea and vomiting.   Genitourinary: Negative for dysuria and hematuria.   Musculoskeletal: Negative for back pain, myalgias and neck pain.   Skin: Negative for itching and rash.   Neurological: Negative for dizziness, focal weakness, seizures, weakness and headaches.   Endo/Heme/Allergies: Negative for polydipsia. Does not bruise/bleed easily.   Psychiatric/Behavioral: Positive for depression. Negative for memory loss. The patient is nervous/anxious. The patient does not have insomnia.         Objective:     Vitals:    08/02/22 1030   BP: (!) 133/59   Pulse: 74   Resp: 18   Temp: 97.9 °F (36.6 °C)   TempSrc: Oral   SpO2: 99%   Weight: 49.8 kg (109 lb 12.6 oz)   Height: 5' 2" (1.575 m)   PainSc: 0-No pain       Physical Examination:   Physical Exam  Vitals and nursing note reviewed.   Constitutional:       General: She is not in acute distress.     Appearance: She is not diaphoretic.   HENT:      Head: Normocephalic.      Mouth/Throat:      Pharynx: No oropharyngeal exudate.   Eyes:      General: No scleral icterus.     Conjunctiva/sclera: Conjunctivae normal.   Neck:      Thyroid: No thyromegaly.   Cardiovascular:      Rate and Rhythm: Normal rate and regular rhythm.      Heart sounds: Normal heart sounds. No murmur heard.  Pulmonary:      Effort: Pulmonary effort is normal. No respiratory distress.      Breath sounds: No stridor. No wheezing or rales.   Chest:      Chest wall: No tenderness.   Abdominal:      General: Bowel sounds are normal. There is no distension.      Palpations: Abdomen is soft. There is no mass.      Tenderness: There is no abdominal tenderness. There is no rebound.   Musculoskeletal:         General: No tenderness or deformity. Normal range of motion.      Cervical back: Neck supple.   Lymphadenopathy:      Cervical: " No cervical adenopathy.   Skin:     General: Skin is warm and dry.      Findings: No erythema or rash.      Comments: Right mastectomy without reconstruction.  No abnormal breast mass, skin, nipple changes or axillary adenopathy on the left side.   Neurological:      Mental Status: She is alert and oriented to person, place, and time.      Cranial Nerves: No cranial nerve deficit.      Coordination: Coordination normal.      Gait: Gait is intact.   Psychiatric:         Mood and Affect: Affect normal.         Cognition and Memory: Memory normal.         Judgment: Judgment normal.          Diagnostic Tests:  Significant Imaging: I have reviewed and interpreted all pertinent imaging results/findings.        Laboratory Data:  All pertinent labs have been reviewed.    Labs:   Lab Results   Component Value Date    WBC 8.25 04/13/2022    HGB 13.2 04/13/2022    HCT 40.8 04/13/2022    MCV 98 04/13/2022     04/13/2022       Assessment/Plan:   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, estrogen receptor positive  pT1b N0 (i+)  grade 2 ER + NJ - HER2 - IDC of the right breast.    She is status post mastectomy and sentinel node exam for IDC as well as DCIS of the right breast.     Oncotype returned high risk, which confers a benefit ot the addition of chemotherapy to AI.   RSclin shows an 8% risk of recurrence with 4% absolute benefit of chemotherapy. Previous discussion reviewed and confirmed with patient. She understands the risk benefit ratio of chemotherapy and has declined it in the adjuvant setting.     She is a candidate for endocrine therapy.   We discussed the role of endocrine therapy in early stage breast cancer to prevent recurrence/systemic and local by about 40-50%.    DEXA scan shows mild osteopenia. Will continue zoledronic acid, see below. Start anastrozole. Continue calcium and vitamin D supplementation.     Osteopenia of multiple sites  Diagnosed with osteopenia/osteoporosis given hip  fracture in 2020. Follows up with Dr. Cornelius and is on zoledronic acid, most recent does in March 17, 2022.   She is due for a follow up dose in September and I will co ordinate care with him.        ECOG SCORE    0 - Fully active-able to carry on all pre-disease performance without restriction           Discussion:   No follow-ups on file.    Plan was discussed with the patient at length, and she verbalized understanding. Bhavna was given an opportunity to ask questions that were answered to her satisfaction, and she was advised to call in the interval if any problems or questions arise.    Electronically signed by Millie Marcelino MD      Route Chart for Scheduling    Med Onc Chart Routing  Urgent    Follow up with physician 6 weeks.   Follow up with LATOYA    Infusion scheduling note    Injection scheduling note Needs zometa every 6 months, first dose 9/16    Labs CMP and CBC   Lab interval:  Isauro Reynolds a day before visit    Imaging    Pharmacy appointment    Other referrals

## 2022-08-03 PROBLEM — M85.89 OSTEOPENIA OF MULTIPLE SITES: Status: ACTIVE | Noted: 2022-08-03

## 2022-08-03 PROBLEM — Z79.811 USE OF ANASTROZOLE: Status: ACTIVE | Noted: 2022-08-03

## 2022-08-03 RX ORDER — SODIUM CHLORIDE 0.9 % (FLUSH) 0.9 %
10 SYRINGE (ML) INJECTION
Status: CANCELLED | OUTPATIENT
Start: 2022-09-16

## 2022-08-03 RX ORDER — HEPARIN 100 UNIT/ML
500 SYRINGE INTRAVENOUS
Status: CANCELLED | OUTPATIENT
Start: 2022-09-16

## 2022-08-24 ENCOUNTER — TELEPHONE (OUTPATIENT)
Dept: INTERNAL MEDICINE | Facility: CLINIC | Age: 67
End: 2022-08-24
Payer: MEDICARE

## 2022-08-24 NOTE — TELEPHONE ENCOUNTER
----- Message from Cecil Villavicenciochristophe sent at 8/24/2022  2:27 PM CDT -----  Contact: PT @ [phone]  [email or web address]     Patient is calling for Medical Advice regarding: Fever, Sore Throat, Bronchitis    How long has patient had these symptoms: 3 Days     Pharmacy name and phone#:   [id number] - [place] - [address] AT [facility] [address]  [address] [address]  Phone: [phone] Fax: [phone]    Would like response via [facility]:  Call     Comments: Patient is requesting to speak with nurse regarding the above symptoms. Please call to advise.  Patient states she is on estrogen blockers for the last 2 weeks and has been taking vitamin C

## 2022-08-24 NOTE — TELEPHONE ENCOUNTER
"Called and spoke to the pt. She took a Home COVID test that was negative 2 days ago. Pt still having coughing and congestion. Pt denies any SOB and/or chest pain.     Pt is scheduled to see Divya tomorrow.    Pt " would rather not come in" She is requesting that Dr. Vargas send over something to her pharmacy.   "

## 2022-08-24 NOTE — TELEPHONE ENCOUNTER
Called and spoke to the pt. She agreed to retest and to try the mucinex and inhaler. I will cxl the appt with Diyva.

## 2022-08-24 NOTE — TELEPHONE ENCOUNTER
She should retest for covid.  Take mucinex, albuterol inhaler for congestion  She does not need an antibiotic, as symptoms sound viral  Ok to cancel appt with Divya if she requests.

## 2022-08-29 ENCOUNTER — TELEPHONE (OUTPATIENT)
Dept: INTERNAL MEDICINE | Facility: CLINIC | Age: 67
End: 2022-08-29
Payer: MEDICARE

## 2022-08-29 RX ORDER — PREDNISONE 20 MG/1
20 TABLET ORAL 2 TIMES DAILY
Qty: 10 TABLET | Refills: 0 | Status: SHIPPED | OUTPATIENT
Start: 2022-08-29 | End: 2022-09-03

## 2022-08-29 RX ORDER — DOXYCYCLINE 100 MG/1
100 CAPSULE ORAL 2 TIMES DAILY
Qty: 14 CAPSULE | Status: SHIPPED | OUTPATIENT
Start: 2022-08-29 | End: 2022-08-29 | Stop reason: SDUPTHER

## 2022-08-29 RX ORDER — DOXYCYCLINE 100 MG/1
100 CAPSULE ORAL 2 TIMES DAILY
Qty: 14 CAPSULE | Refills: 0 | Status: SHIPPED | OUTPATIENT
Start: 2022-08-29 | End: 2022-09-05

## 2022-08-29 NOTE — TELEPHONE ENCOUNTER
Called and spoke to pt. Pt states she started with symptoms on the 22nd, tested negative for covid on the 23rd, 24th and again today. Pt states she has a lot of mucous in her chest and she is unable to cough the mucous up. States she is taking Mucinex and increased her fluid intake without relief. Pt states when she does get mucous up, it is clear. Pt states that she is also wheezing and using her inhaler without much relief. Pt also states she thinks she has a sinus infection. States she is blowing yellow and green mucous out of her nose.  Pt denies fever, CP, and SOB. Attempted to schedule pt for appt, but states she really doesn't want to come into the office, prefers a Rx.

## 2022-08-29 NOTE — TELEPHONE ENCOUNTER
Called and spoke to the pt. Informed her that Dr. Vargas did send over 2 rx. Informed pt that if she is not feeling better in a couple of day to come get scheduled for an appt. The pt expressed understanding.

## 2022-08-31 ENCOUNTER — TELEPHONE (OUTPATIENT)
Dept: GASTROENTEROLOGY | Facility: CLINIC | Age: 67
End: 2022-08-31
Payer: MEDICARE

## 2022-08-31 DIAGNOSIS — Z86.010 HISTORY OF ADENOMATOUS POLYP OF COLON: Primary | ICD-10-CM

## 2022-09-01 ENCOUNTER — TELEPHONE (OUTPATIENT)
Dept: INTERNAL MEDICINE | Facility: CLINIC | Age: 67
End: 2022-09-01
Payer: MEDICARE

## 2022-09-01 NOTE — TELEPHONE ENCOUNTER
----- Message from Maricel Muñoz sent at 9/1/2022  2:18 PM CDT -----  Contact: pt [phone]  Patient is returning a phone call.  Who left a message for the patient: Violet Da Silva LPN   Does patient know what this is regarding: n/a  Would you like a call back, or a response through your [facility] portal?:   call     Please call and advise.    Thank You

## 2022-09-01 NOTE — TELEPHONE ENCOUNTER
I would recommend Miralax - OTC  Should work.    If not helpful, or she has already tried , may try Magnesium Citrate.

## 2022-09-01 NOTE — TELEPHONE ENCOUNTER
----- Message from Maricel Muñoz sent at 9/1/2022 12:28 PM CDT -----  Contact: pt [phone]  Prescription Request:     Name of medication: See Symptoms    Reason for request: constipated for 4 days    Pharmacy:   [id number] - [place] [address] AT [facility]  [address] [address]  Phone: [phone] Fax: [phone]    Please contact the patient with the status of this request.    Thank You

## 2022-09-01 NOTE — TELEPHONE ENCOUNTER
Pt states she hasn't had a BM in 4 days. Pt states she has tried stool softeners, Dulcolax, Steven laxative without relief. Pt states she feels full and her stomach is swollen. Pt asking for a prescription to assist her in having a BM.

## 2022-09-12 ENCOUNTER — LAB VISIT (OUTPATIENT)
Dept: LAB | Facility: HOSPITAL | Age: 67
End: 2022-09-12
Attending: INTERNAL MEDICINE
Payer: MEDICARE

## 2022-09-12 DIAGNOSIS — C50.411 CARCINOMA OF UPPER-OUTER QUADRANT OF RIGHT BREAST IN FEMALE, ESTROGEN RECEPTOR POSITIVE: ICD-10-CM

## 2022-09-12 DIAGNOSIS — Z17.0 CARCINOMA OF UPPER-OUTER QUADRANT OF RIGHT BREAST IN FEMALE, ESTROGEN RECEPTOR POSITIVE: ICD-10-CM

## 2022-09-12 LAB
ALBUMIN SERPL BCP-MCNC: 3.5 G/DL (ref 3.5–5.2)
ALP SERPL-CCNC: 60 U/L (ref 55–135)
ALT SERPL W/O P-5'-P-CCNC: 16 U/L (ref 10–44)
ANION GAP SERPL CALC-SCNC: 6 MMOL/L (ref 8–16)
AST SERPL-CCNC: 21 U/L (ref 10–40)
BASOPHILS # BLD AUTO: 0.05 K/UL (ref 0–0.2)
BASOPHILS NFR BLD: 0.5 % (ref 0–1.9)
BILIRUB SERPL-MCNC: 0.3 MG/DL (ref 0.1–1)
BUN SERPL-MCNC: 6 MG/DL (ref 8–23)
CALCIUM SERPL-MCNC: 9.3 MG/DL (ref 8.7–10.5)
CHLORIDE SERPL-SCNC: 98 MMOL/L (ref 95–110)
CO2 SERPL-SCNC: 31 MMOL/L (ref 23–29)
CREAT SERPL-MCNC: 0.9 MG/DL (ref 0.5–1.4)
DIFFERENTIAL METHOD: ABNORMAL
EOSINOPHIL # BLD AUTO: 0.1 K/UL (ref 0–0.5)
EOSINOPHIL NFR BLD: 1.4 % (ref 0–8)
ERYTHROCYTE [DISTWIDTH] IN BLOOD BY AUTOMATED COUNT: 13.7 % (ref 11.5–14.5)
EST. GFR  (NO RACE VARIABLE): >60 ML/MIN/1.73 M^2
GLUCOSE SERPL-MCNC: 144 MG/DL (ref 70–110)
HCT VFR BLD AUTO: 35.1 % (ref 37–48.5)
HGB BLD-MCNC: 11.5 G/DL (ref 12–16)
IMM GRANULOCYTES # BLD AUTO: 0.04 K/UL (ref 0–0.04)
IMM GRANULOCYTES NFR BLD AUTO: 0.4 % (ref 0–0.5)
LYMPHOCYTES # BLD AUTO: 3.8 K/UL (ref 1–4.8)
LYMPHOCYTES NFR BLD: 39.9 % (ref 18–48)
MCH RBC QN AUTO: 30.9 PG (ref 27–31)
MCHC RBC AUTO-ENTMCNC: 32.8 G/DL (ref 32–36)
MCV RBC AUTO: 94 FL (ref 82–98)
MONOCYTES # BLD AUTO: 0.6 K/UL (ref 0.3–1)
MONOCYTES NFR BLD: 6.2 % (ref 4–15)
NEUTROPHILS # BLD AUTO: 4.8 K/UL (ref 1.8–7.7)
NEUTROPHILS NFR BLD: 51.6 % (ref 38–73)
NRBC BLD-RTO: 0 /100 WBC
PLATELET # BLD AUTO: 469 K/UL (ref 150–450)
PMV BLD AUTO: 8.3 FL (ref 9.2–12.9)
POTASSIUM SERPL-SCNC: 4.7 MMOL/L (ref 3.5–5.1)
PROT SERPL-MCNC: 5.5 G/DL (ref 6–8.4)
RBC # BLD AUTO: 3.72 M/UL (ref 4–5.4)
SODIUM SERPL-SCNC: 135 MMOL/L (ref 136–145)
WBC # BLD AUTO: 9.39 K/UL (ref 3.9–12.7)

## 2022-09-12 PROCEDURE — 80053 COMPREHEN METABOLIC PANEL: CPT | Performed by: INTERNAL MEDICINE

## 2022-09-12 PROCEDURE — 36415 COLL VENOUS BLD VENIPUNCTURE: CPT | Mod: PO | Performed by: INTERNAL MEDICINE

## 2022-09-12 PROCEDURE — 85025 COMPLETE CBC W/AUTO DIFF WBC: CPT | Performed by: INTERNAL MEDICINE

## 2022-09-20 ENCOUNTER — OFFICE VISIT (OUTPATIENT)
Dept: OPHTHALMOLOGY | Facility: CLINIC | Age: 67
End: 2022-09-20
Payer: MEDICARE

## 2022-09-20 DIAGNOSIS — H43.813 POSTERIOR VITREOUS DETACHMENT, BOTH EYES: ICD-10-CM

## 2022-09-20 DIAGNOSIS — H35.3131 EARLY DRY STAGE NONEXUDATIVE AGE-RELATED MACULAR DEGENERATION OF BOTH EYES: Primary | ICD-10-CM

## 2022-09-20 DIAGNOSIS — H33.311 RETINAL TEAR OF RIGHT EYE: ICD-10-CM

## 2022-09-20 PROCEDURE — 92134 CPTRZ OPH DX IMG PST SGM RTA: CPT | Mod: S$GLB,,, | Performed by: OPHTHALMOLOGY

## 2022-09-20 PROCEDURE — 1101F PT FALLS ASSESS-DOCD LE1/YR: CPT | Mod: CPTII,S$GLB,, | Performed by: OPHTHALMOLOGY

## 2022-09-20 PROCEDURE — 99999 PR PBB SHADOW E&M-EST. PATIENT-LVL IV: CPT | Mod: PBBFAC,,, | Performed by: OPHTHALMOLOGY

## 2022-09-20 PROCEDURE — 92201 OPSCPY EXTND RTA DRAW UNI/BI: CPT | Mod: S$GLB,,, | Performed by: OPHTHALMOLOGY

## 2022-09-20 PROCEDURE — 1101F PR PT FALLS ASSESS DOC 0-1 FALLS W/OUT INJ PAST YR: ICD-10-PCS | Mod: CPTII,S$GLB,, | Performed by: OPHTHALMOLOGY

## 2022-09-20 PROCEDURE — 1126F PR PAIN SEVERITY QUANTIFIED, NO PAIN PRESENT: ICD-10-PCS | Mod: CPTII,S$GLB,, | Performed by: OPHTHALMOLOGY

## 2022-09-20 PROCEDURE — 92201 PR OPHTHALMOSCOPY, EXT, W/RET DRAW/SCLERAL DEPR, I&R, UNI/BI: ICD-10-PCS | Mod: S$GLB,,, | Performed by: OPHTHALMOLOGY

## 2022-09-20 PROCEDURE — 92134 POSTERIOR SEGMENT OCT RETINA (OCULAR COHERENCE TOMOGRAPHY)-BOTH EYES: ICD-10-PCS | Mod: S$GLB,,, | Performed by: OPHTHALMOLOGY

## 2022-09-20 PROCEDURE — 1126F AMNT PAIN NOTED NONE PRSNT: CPT | Mod: CPTII,S$GLB,, | Performed by: OPHTHALMOLOGY

## 2022-09-20 PROCEDURE — 4010F ACE/ARB THERAPY RXD/TAKEN: CPT | Mod: CPTII,S$GLB,, | Performed by: OPHTHALMOLOGY

## 2022-09-20 PROCEDURE — 1159F PR MEDICATION LIST DOCUMENTED IN MEDICAL RECORD: ICD-10-PCS | Mod: CPTII,S$GLB,, | Performed by: OPHTHALMOLOGY

## 2022-09-20 PROCEDURE — 3288F PR FALLS RISK ASSESSMENT DOCUMENTED: ICD-10-PCS | Mod: CPTII,S$GLB,, | Performed by: OPHTHALMOLOGY

## 2022-09-20 PROCEDURE — 99999 PR PBB SHADOW E&M-EST. PATIENT-LVL IV: ICD-10-PCS | Mod: PBBFAC,,, | Performed by: OPHTHALMOLOGY

## 2022-09-20 PROCEDURE — 92014 PR EYE EXAM, EST PATIENT,COMPREHESV: ICD-10-PCS | Mod: S$GLB,,, | Performed by: OPHTHALMOLOGY

## 2022-09-20 PROCEDURE — 92014 COMPRE OPH EXAM EST PT 1/>: CPT | Mod: S$GLB,,, | Performed by: OPHTHALMOLOGY

## 2022-09-20 PROCEDURE — 1159F MED LIST DOCD IN RCRD: CPT | Mod: CPTII,S$GLB,, | Performed by: OPHTHALMOLOGY

## 2022-09-20 PROCEDURE — 1160F RVW MEDS BY RX/DR IN RCRD: CPT | Mod: CPTII,S$GLB,, | Performed by: OPHTHALMOLOGY

## 2022-09-20 PROCEDURE — 1160F PR REVIEW ALL MEDS BY PRESCRIBER/CLIN PHARMACIST DOCUMENTED: ICD-10-PCS | Mod: CPTII,S$GLB,, | Performed by: OPHTHALMOLOGY

## 2022-09-20 PROCEDURE — 3288F FALL RISK ASSESSMENT DOCD: CPT | Mod: CPTII,S$GLB,, | Performed by: OPHTHALMOLOGY

## 2022-09-20 PROCEDURE — 4010F PR ACE/ARB THEARPY RXD/TAKEN: ICD-10-PCS | Mod: CPTII,S$GLB,, | Performed by: OPHTHALMOLOGY

## 2022-09-20 NOTE — PROGRESS NOTES
HPI    Patient here today for 2 yr f/u. VA stable ou, no pain and no f/f.    Last edited by Cheyanne Kovacs on 9/20/2022  1:42 PM.          OCT few drusen      A/P    1. VD OS -  No tears or breaks    RD precautions    2. PVD OD    3. PCIOL OU  Increased PCO OU with worsening glare  Return to Dr. Song for YAG    4. Duane's OD    5. VR tag with small flap OD @ 7:00 s/p barrier 10/13 with Dr. Moy    6. Pingueculum OD - AT's    7. Brow ptosis on right  X 1-2 years - Consult [facility] for Brow lift    8. Brief episode of color Va change  Lasted seconds.  Resolved without recurrence  Amaurosis precautions    9. Early AMD OU  monitor      2 yr. OCT

## 2022-09-27 ENCOUNTER — HOSPITAL ENCOUNTER (OUTPATIENT)
Dept: RADIOLOGY | Facility: HOSPITAL | Age: 67
Discharge: HOME | End: 2022-09-27
Attending: INTERNAL MEDICINE
Payer: MEDICARE

## 2022-09-27 ENCOUNTER — OFFICE VISIT (OUTPATIENT)
Dept: HEMATOLOGY/ONCOLOGY | Facility: CLINIC | Age: 67
End: 2022-09-27
Payer: MEDICARE

## 2022-09-27 VITALS
BODY MASS INDEX: 19.92 KG/M2 | WEIGHT: 108.25 LBS | SYSTOLIC BLOOD PRESSURE: 135 MMHG | HEART RATE: 78 BPM | OXYGEN SATURATION: 98 % | RESPIRATION RATE: 16 BRPM | HEIGHT: 62 IN | TEMPERATURE: 98 F | DIASTOLIC BLOOD PRESSURE: 63 MMHG

## 2022-09-27 DIAGNOSIS — M25.50 AROMATASE INHIBITOR-ASSOCIATED ARTHRALGIA: ICD-10-CM

## 2022-09-27 DIAGNOSIS — Z79.811 USE OF ANASTROZOLE: ICD-10-CM

## 2022-09-27 DIAGNOSIS — T45.1X5A AROMATASE INHIBITOR-ASSOCIATED ARTHRALGIA: ICD-10-CM

## 2022-09-27 DIAGNOSIS — Z17.0 CARCINOMA OF UPPER-OUTER QUADRANT OF RIGHT BREAST IN FEMALE, ESTROGEN RECEPTOR POSITIVE: ICD-10-CM

## 2022-09-27 DIAGNOSIS — C50.411 CARCINOMA OF UPPER-OUTER QUADRANT OF RIGHT BREAST IN FEMALE, ESTROGEN RECEPTOR POSITIVE: ICD-10-CM

## 2022-09-27 DIAGNOSIS — R05.9 COUGH: Primary | ICD-10-CM

## 2022-09-27 DIAGNOSIS — M85.89 OSTEOPENIA OF MULTIPLE SITES: ICD-10-CM

## 2022-09-27 DIAGNOSIS — R05.9 COUGH: ICD-10-CM

## 2022-09-27 PROCEDURE — 1126F PR PAIN SEVERITY QUANTIFIED, NO PAIN PRESENT: ICD-10-PCS | Mod: CPTII,S$GLB,, | Performed by: INTERNAL MEDICINE

## 2022-09-27 PROCEDURE — 3075F SYST BP GE 130 - 139MM HG: CPT | Mod: CPTII,S$GLB,, | Performed by: INTERNAL MEDICINE

## 2022-09-27 PROCEDURE — 3078F DIAST BP <80 MM HG: CPT | Mod: CPTII,S$GLB,, | Performed by: INTERNAL MEDICINE

## 2022-09-27 PROCEDURE — 99499 RISK ADDL DX/OHS AUDIT: ICD-10-PCS | Mod: S$GLB,,, | Performed by: INTERNAL MEDICINE

## 2022-09-27 PROCEDURE — 71046 X-RAY EXAM CHEST 2 VIEWS: CPT | Mod: 26,,, | Performed by: RADIOLOGY

## 2022-09-27 PROCEDURE — 4010F ACE/ARB THERAPY RXD/TAKEN: CPT | Mod: CPTII,S$GLB,, | Performed by: INTERNAL MEDICINE

## 2022-09-27 PROCEDURE — 99499 UNLISTED E&M SERVICE: CPT | Mod: S$GLB,,, | Performed by: INTERNAL MEDICINE

## 2022-09-27 PROCEDURE — 3075F PR MOST RECENT SYSTOLIC BLOOD PRESS GE 130-139MM HG: ICD-10-PCS | Mod: CPTII,S$GLB,, | Performed by: INTERNAL MEDICINE

## 2022-09-27 PROCEDURE — 3008F PR BODY MASS INDEX (BMI) DOCUMENTED: ICD-10-PCS | Mod: CPTII,S$GLB,, | Performed by: INTERNAL MEDICINE

## 2022-09-27 PROCEDURE — 99214 PR OFFICE/OUTPT VISIT, EST, LEVL IV, 30-39 MIN: ICD-10-PCS | Mod: S$GLB,,, | Performed by: INTERNAL MEDICINE

## 2022-09-27 PROCEDURE — 3008F BODY MASS INDEX DOCD: CPT | Mod: CPTII,S$GLB,, | Performed by: INTERNAL MEDICINE

## 2022-09-27 PROCEDURE — 99999 PR PBB SHADOW E&M-EST. PATIENT-LVL III: CPT | Mod: PBBFAC,,, | Performed by: INTERNAL MEDICINE

## 2022-09-27 PROCEDURE — 3078F PR MOST RECENT DIASTOLIC BLOOD PRESSURE < 80 MM HG: ICD-10-PCS | Mod: CPTII,S$GLB,, | Performed by: INTERNAL MEDICINE

## 2022-09-27 PROCEDURE — 71046 XR CHEST PA AND LATERAL: ICD-10-PCS | Mod: 26,,, | Performed by: RADIOLOGY

## 2022-09-27 PROCEDURE — 4010F PR ACE/ARB THEARPY RXD/TAKEN: ICD-10-PCS | Mod: CPTII,S$GLB,, | Performed by: INTERNAL MEDICINE

## 2022-09-27 PROCEDURE — 71046 X-RAY EXAM CHEST 2 VIEWS: CPT | Mod: TC,FY

## 2022-09-27 PROCEDURE — 99214 OFFICE O/P EST MOD 30 MIN: CPT | Mod: S$GLB,,, | Performed by: INTERNAL MEDICINE

## 2022-09-27 PROCEDURE — 1126F AMNT PAIN NOTED NONE PRSNT: CPT | Mod: CPTII,S$GLB,, | Performed by: INTERNAL MEDICINE

## 2022-09-27 PROCEDURE — 99999 PR PBB SHADOW E&M-EST. PATIENT-LVL III: ICD-10-PCS | Mod: PBBFAC,,, | Performed by: INTERNAL MEDICINE

## 2022-09-27 NOTE — PROGRESS NOTES
CONSULT NOTE    Subjective:       Patient ID: Bhavna Landry is a 67 y.o. female.  MRN: [id number]  : 1955    Chief Complaint: No chief complaint on file.  pT1b N0 (i+)  grade 2 ER + SD - HER2 - IDC of the right breast.    History of Present Illness:   Bhavna Landry is a 67 y.o. female who is referred for right breast IDC.     She presented for screening mammogram on 2022 . This identified an asymmetry in the right breast. Follow-up mammogram and ultrasound on 2022 showed a 1 x 0.9 x 0.8 cm mass at the 9 o'clock position of the right breast 3 cm from the nipple. A ultrasound guided biopsy was performed on 2022 with pathology revealing invasive ductal carcinoma of the breast.     She saw Dr. Dhillon and underwent right mastectomy and sentinel node exam.  It showed IDC 8 mm, grade 2, ER strongly positive, SD negative, HER2/jayla 1+, Ki-67 12%.  There was also DCIS, multiple foci up to 8 mm, spanning a distance of 20 mm.  Nuclear grade 2. Two sentinel nodes removed, 1 had isolated tumor cells.    Interim history:   She presents today with her daughter to discuss further recommendations.  Oncotype DX RS 28; Chemo benefit >15%     She has declined adjuvant chemotherapy.     She is on Letrozole for the past 6 weeks. Denies hot flashes but has noticed worsening right hand and wrist pains. Tolerable.  She has been having a productive cough for over 3 weeks, not relieved with OTC medications. Denies fever or chills.       Oncology History:  1. Breast, right, mastectomy:   - Invasive ductal carcinoma       - Greatest dimension:  8 mm       - Jonathan-Alaniz modified SBR score 3 + 2 + 1 = 6 of 9       - Histologic grade 2 of 3       - Mitotic index = 0.2 (average mitoses per high power field) (low)       - Resection margin free of invasive carcinoma           - Invasive carcinoma present 9 mm to closest posterior margin, 26 mm   to inferior margin, and 45 mm to  "superior margin   - Biopsy clip present   - No lymphovascular invasion is identified   - Ductal carcinoma in situ       - Foci up to 8 mm in greatest dimension spanning a distance of   approximately 20 mm       - Nuclear grade ranges from 2 to 3 of 3       - Solid and cribriform patterns       - Central comedonecrosis present:  Approximately 80%       - Resection margin free of DCIS           - DCIS present present 9 mm to closest posterior margin, 26 mm to   inferior margin, and 45 mm to superior margin   - Background breast contains usual ductal hyperplasia, apocrine metaplasia,   microcysts and macrocysts, ectatic ducts, and stromal fibrosis   - Microcalcifications, calcium phosphate type, associated with DCIS and   focally with medial calcific arterial stenosis (Monckeberg's sclerosis)   - See CAP Tumor Synoptic   2. [place] lymph node, right axillary, "#1 hot and blue count 2004,"   excision:   - 1 lymph node, isolated tumor cells present   3. [place] lymph node, right axillary, "#2 hot and blue count 1700,"   excision:   - 1 lymph node, negative for metastatic carcinoma   CAP Tumor Synoptic:   Procedure:  Total mastectomy   Specimen laterality:  Right   Tumor site:  9 o'clock   Histologic type:  Invasive carcinoma of no special type (ductal)   Histologic grade (Montgomery histologic score):  Shelly score 3 + 2 + 1 =   6 of 9   Glandular (acinar) / tubular differentiation:  Score 3   Nuclear pleomorphism:  Score 2   Mitotic grade:  Score 1   Overall grade:  Grade 2   Tumor size:  8 mm   Tumor focality:  Single focus of invasive carcinoma   Ductal carcinoma in situ (DCIS):  Present (Negative for extensive intraductal   component)       Size (extent) of DCIS:  Foci up to 8 mm in greatest dimension spanning a   distance of approximately 20 mm       Number blocks with DCIS:  4       Number blocks examined:  13       Architectural patterns:  Comedo, cribriform, solid       Nuclear grade:  Ranges from grade 2 " (intermediate) to grade 3 (high)   Lobular carcinoma in situ (LCIS):  Not identified   Tumor extent:  Not applicable   Lymphovascular invasion:  Not identified   Dermal lymphovascular invasion:  Not identified   Microcalcifications:  Present in DCIS and nonneoplastic tissue   Treatment effect in the breast:  No known pre-surgical therapy   Treatment effect in the lymph nodes:  Not applicable   Margin status for invasive carcinoma:  All margins negative for invasive   carcinoma       Distance from invasive carcinoma closest posterior margin:  9 mm       Distance from invasive carcinoma to inferior margin:  26 mm       Distance from invasive carcinoma to superior margin:  45 mm   Margin status for DCIS:  All margins negative for DCIS       Distant from DCIS to closest posterior margin:  9 mm       Distance from DCIS to inferior margin:  26 mm       Distance from DCIS to superior margin:  45 mm   Regional lymph node status:  Tumor present in regional lymph nodes   Number of lymph nodes with macrometastases:  0   Number of lymph nodes with micrometastases:  0   Number of lymph nodes with isolated tumor cells:  1   Size of largest jose l metastatic deposit:  0.13 mm   Extranodal extension:  Not identified   Total number of lymph nodes examined (sentinel and nonsentinel):  2   Number of sentinel nodes examined:  2   Distant sites involved:  Not applicable   TNM descriptors:  Not applicable   pT category:  pT1b   Regional lymph nodes modifier:  (sn)   pN category:  (sn) pN0 (i+)   pM category:  Not applicable - pM cannot be determined from the submitted   specimens   Special studies:  Biomarkers, assessed by immunohistochemistry on prior right   breast biopsy ([facility]-) with following reported results:       - Estrogen Receptor (ER):  Positive (100%; strong intensity)       - Progesterone Receptor (MO):  Negative (<1%)       - HER2:  Negative (1+)       - Ki-67 proliferation index:  12% (low)       Oncology History    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, estrogen receptor positive   4/13/2022 Initial Diagnosis    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, estrogen receptor positive     4/13/2022 Cancer Staged    Staging form: Breast, AJCC 8th Edition  - Clinical stage from 4/13/2022: Stage IA (cT1b, cN0, cM0, G2, ER+, RI-, HER2-)       7/19/2022 - 7/19/2022 Chemotherapy    Treatment Summary   Plan Name: OP BREAST TC - DOCETAXEL CYCLOPHOSPHAMIDE Q3W  Treatment Goal: Curative  Status: Inactive  Start Date:   End Date:   Provider: Idania Martins MD  Chemotherapy: cyclophosphamide 600 mg/m2 = 880 mg in sodium chloride 0.9% 250 mL chemo infusion, 600 mg/m2, Intravenous, Clinic/HOD 1 time, 0 of 4 cycles  DOCEtaxeL (TAXOTERE) 75 mg/m2 = 110 mg in sodium chloride 0.9% 250 mL chemo infusion, 75 mg/m2, Intravenous, Clinic/HOD 1 time, 0 of 4 cycles           History:  Past Medical History:   Diagnosis Date    Allergy     Amblyopia     Anemia     Anticoagulant long-term use     Arthritis 02/02/1992    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, estrogen receptor positive 4/13/2022    Cataract     Depression     Dry eyes     Dry mouth     Duane's syndrome of right eye     Early dry stage nonexudative age-related macular degeneration of both eyes 9/20/2022    Fibromyalgia 4/17/2014    Fibromyalgia     Fractured hip     RIGHT HIP    GERD (gastroesophageal reflux disease)     History of psychiatric hospitalization     Hyperlipidemia 02/02/1992    Hypertension     Hypocalcemia     Hyponatremia     Kidney stone     Migraine headache     Osteoporosis     Psoriatic arthritis 02/02/1992    Right knee pain     post knee replacement surgery (possible rejectiion of metal)    RLS (restless legs syndrome)     Schizophrenia 02/02/1992    stable on meds    Squamous cell carcinoma of skin     Urinary tract infection       Past Surgical History:   Procedure Laterality Date    brow ptosis repair  Right 2019    Surgeon: Dr. Karla Henson    CATARACT EXTRACTION       SECTION      COLONOSCOPY N/A 2016    Procedure: COLONOSCOPY;  Surgeon: ANDRIA Connelly MD;  Location: [address]);  Service: Endoscopy;  Laterality: N/A;    cyst removed from right sinus  1982    HYSTERECTOMY      VAGINAL HYSTERECTOMY WITHOUT BSO - ENDOMETRIOSIS    INJECTION FOR SENTINEL NODE IDENTIFICATION Right 2022    Procedure: INJECTION, FOR SENTINEL NODE IDENTIFICATION-Right;  Surgeon: NEAL Dhillon MD;  Location: [facility] OR;  Service: General;  Laterality: Right;    INJECTION OF ANESTHETIC AGENT AROUND MEDIAL BRANCH NERVES INNERVATING LUMBAR FACET JOINT N/A 2019    Procedure: Lumbo-sacral Block, DR5 and Lateral Branches of S1,S2, S3;  Surgeon: Michelle Pineda Jr., MD;  Location: [facility] PAIN MGT;  Service: Pain Management;  Laterality: N/A;  Pt takes  and states she holds ASA on her own whenever she has procedures.  Instructed to hold x 3 days prior to procedure.      INJECTION OF ANESTHETIC AGENT INTO SACROILIAC JOINT Right 2018    Procedure: BLOCK, SACROILIAC JOINT-Right- ORAL SEDATION;  Surgeon: Michelle Pineda Jr., MD;  Location: [facility] PAIN MGT;  Service: Pain Management;  Laterality: Right;    INJECTION OF ANESTHETIC AGENT INTO SACROILIAC JOINT Bilateral 2018    Procedure: BLOCK, SACROILIAC JOINT-BILATERAL;  Surgeon: Michelle Pineda Jr., MD;  Location: [facility] PAIN MGT;  Service: Pain Management;  Laterality: Bilateral;  Please keep at 10:00 due to trasnsportation    INJECTION OF ANESTHETIC AGENT INTO SACROILIAC JOINT Bilateral 2019    Procedure: Bilateral Sacroiliac Joint Injection - Per Dr Pineda, not necessary to hold ASA.;  Surgeon: Michelle Pineda Jr., MD;  Location: [facility] PAIN [facility]GT;  Service: Pain Management;  Laterality: Bilateral;    INTRAOCULAR PROSTHESES INSERTION Right 2019    Procedure: INSERTION, IOL PROSTHESIS;  Surgeon: Karen Song MD;  Location:  [facility] OR 1ST FLR;  Service: Ophthalmology;  Laterality: Right;    INTRAOCULAR PROSTHESES INSERTION Left 9/26/2019    Procedure: INSERTION, IOL PROSTHESIS;  Surgeon: Karen Song MD;  [facility];  Service: Ophthalmology;  Laterality: Left;    JOINT REPLACEMENT Right     knee    KNEE SURGERY      MASTECTOMY Right 5/9/2022    Procedure: MASTECTOMY-Right;  Surgeon: NEAL Dhillon MD;  Location: [facility];  Service: General;  Laterality: Right;  2.5 HOURS    PHACOEMULSIFICATION OF CATARACT Right 8/1/2019    Procedure: PHACOEMULSIFICATION, CATARACT;  Surgeon: Karen Song MD;  [facility];  Service: Ophthalmology;  Laterality: Right;    PHACOEMULSIFICATION OF CATARACT Left 9/26/2019    Procedure: PHACOEMULSIFICATION, CATARACT;  Surgeon: Karen Song MD;  Location: [facility] OR [address] [place] [address];  Service: Ophthalmology;  Laterality: Left;    RADIOFREQUENCY THERMOCOAGULATION Right 5/7/2019    Procedure: RADIOFREQUENCY THERMAL COAGULATION RIGHT DORSAL RAMUS 5 AND LATERAL BRANCH OF S1, S2 AND S3;  Surgeon: Michelle Pineda Jr., MD;  Location: [facility];  Service: Pain Management;  Laterality: Right;    RADIOFREQUENCY THERMOCOAGULATION Left 5/14/2019    Procedure: RADIOFREQUENCY THERMAL COAGULATION LEFT DORSAL RAMUS 5 AND LATERAL BRANCH OF S1,S2 AND S3;  Surgeon: Michelle Pineda Jr., MD;  Location: [facility] PAIN T;  Service: Pain Management;  Laterality: Left;    RADIOFREQUENCY THERMOCOAGULATION Right 10/22/2019    Procedure: RADIOFREQUENCY THERMAL COAGULATION---DARSAL RAMUS 5 and LATERAL S1,S2,and S3 Right;  Surgeon: Michelle Pineda Jr., MD;  Location: [facility] PAIN [facility]GT;  Service: Pain Management;  Laterality: Right;  patient to sign consent DOS    RADIOFREQUENCY THERMOCOAGULATION Left 10/29/2019    Procedure: RADIOFREQUENCY THERMAL COAGULATION - LEFT - DR5, S1,S2, AND S3;  Surgeon: Michelle Pineda Jr., MD;  Location: [facility] PAIN MGT;  Service: Pain Management;  Laterality: Left;     RADIOFREQUENCY THERMOCOAGULATION Left 5/26/2020    Procedure: RADIOFREQUENCY THERMAL COAGULATION--Left DR5+ lateral branches of S1, S2, S3;  Surgeon: Michelle Pineda Jr., MD;  Location: [facility];  Service: Pain Management;  Laterality: Left;    RADIOFREQUENCY THERMOCOAGULATION Right 6/2/2020    Procedure: RADIOFREQUENCY THERMAL COAGULATION--Right DR5+ lateral branches of S1, S2, S3;  Surgeon: Michelle Pineda Jr., MD;  Location: [facility];  Service: Pain Management;  Laterality: Right;    SENTINEL LYMPH NODE BIOPSY Right 5/9/2022    Procedure: BIOPSY, LYMPH NODE, SENTINEL-Right;  Surgeon: NEAL Dhillon MD;  Location: [facility];  Service: General;  Laterality: Right;    Surgery on right knee  07/09/1982    tumor removed from back left side upper shoulder  03/17/2006     Family History   Problem Relation Age of Onset    Colon cancer Mother     Psoriasis Mother     Cancer Mother         colon    Depression Mother     Cancer Father         lymphoma    Stroke Sister     Diabetes Brother     Arthritis Brother     Heart disease Brother         cad    No Known Problems Daughter     Hypertension Neg Hx     Suicide Neg Hx     Amblyopia Neg Hx     Blindness Neg Hx     Cataracts Neg Hx     Glaucoma Neg Hx     Macular degeneration Neg Hx     Retinal detachment Neg Hx     Strabismus Neg Hx       Social History     Tobacco Use    Smoking status: Every Day     Packs/day: 0.25     Years: 10.00     Pack years: 2.50     Types: Cigarettes    Smokeless tobacco: Former    Tobacco comments:     about 5 cig a day   Substance and Sexual Activity    Alcohol use: No    Drug use: No    Sexual activity: Not Currently     Partners: Male     Birth control/protection: Post-menopausal        ROS:   Review of Systems   Constitutional:  Negative for fever, malaise/fatigue and weight loss.   HENT:  Negative for congestion, ear pain, nosebleeds and sore throat.    Eyes:  Negative for blurred vision, double vision and photophobia.  "  Respiratory:  Positive for cough (x 2 weeks) and sputum production. Negative for hemoptysis, shortness of breath, wheezing and stridor.    Cardiovascular:  Negative for chest pain, palpitations, orthopnea and leg swelling.   Gastrointestinal:  Negative for abdominal pain, blood in stool, constipation, diarrhea, heartburn, melena, nausea and vomiting.   Genitourinary:  Negative for dysuria and hematuria.   Musculoskeletal:  Positive for joint pain (right wrist and hand, intermittent). Negative for back pain, myalgias and neck pain.   Skin:  Negative for itching and rash.   Neurological:  Negative for dizziness, focal weakness, seizures, weakness and headaches.   Endo/Heme/Allergies:  Negative for polydipsia. Does not bruise/bleed easily.   Psychiatric/Behavioral:  Positive for depression. Negative for memory loss. The patient is nervous/anxious. The patient does not have insomnia.       Objective:     Vitals:    09/27/22 0940   BP: 135/63   Pulse: 78   Resp: 16   Temp: 98.2 °F (36.8 °C)   SpO2: 98%   Weight: 49.1 kg (108 lb 3.9 oz)   Height: 5' 2" (1.575 m)   PainSc: 0-No pain       Physical Examination:   Physical Exam  Vitals and nursing note reviewed.   Constitutional:       General: She is not in acute distress.     Appearance: She is not diaphoretic.   HENT:      Head: Normocephalic.      Mouth/Throat:      Pharynx: No oropharyngeal exudate.   Eyes:      General: No scleral icterus.     Conjunctiva/sclera: Conjunctivae normal.   Neck:      Thyroid: No thyromegaly.   Cardiovascular:      Rate and Rhythm: Normal rate and regular rhythm.      Heart sounds: Normal heart sounds. No murmur heard.  Pulmonary:      Effort: Pulmonary effort is normal. No respiratory distress.      Breath sounds: No stridor. Rhonchi and rales (right lower lobe) present. No wheezing.   Chest:      Chest wall: No tenderness.   Abdominal:      General: Bowel sounds are normal. There is no distension.      Palpations: Abdomen is soft. There " is no mass.      Tenderness: There is no abdominal tenderness. There is no rebound.   Musculoskeletal:         General: No tenderness or deformity. Normal range of motion.      Cervical back: Neck supple.   Lymphadenopathy:      Cervical: No cervical adenopathy.   Skin:     General: Skin is warm and dry.      Findings: No erythema or rash.      Comments: Right mastectomy without reconstruction.  No abnormal breast mass, skin, nipple changes or axillary adenopathy on the left side.   Neurological:      Mental Status: She is alert and oriented to person, place, and time.      Cranial Nerves: No cranial nerve deficit.      Coordination: Coordination normal.      Gait: Gait is intact.   Psychiatric:         Mood and Affect: Affect normal.         Cognition and Memory: Memory normal.         Judgment: Judgment normal.        Diagnostic Tests:  Significant Imaging: I have reviewed and interpreted all pertinent imaging results/findings.    Laboratory Data:  All pertinent labs have been reviewed.    Labs:   Lab Results   Component Value Date    WBC 9.39 09/12/2022    HGB 11.5 (L) 09/12/2022    HCT 35.1 (L) 09/12/2022    MCV 94 09/12/2022     (H) 09/12/2022       Assessment/Plan:   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, estrogen receptor positive  pT1b N0 (i+)  grade 2 ER + DC - HER2 - IDC of the right breast.    She is status post mastectomy and sentinel node exam for IDC as well as DCIS of the right breast.     Oncotype returned high risk, which confers a benefit ot the addition of chemotherapy to AI.   RSclin showed an 8% risk of recurrence with 4% absolute benefit of chemotherapy. She has declined chemotherapy.     Tolerating anastrozole relatively well. Continue at this time.     Continue active surveillance. Will see her back in 3 months. Left mammogram is due in March.     Osteopenia of multiple sites  Diagnosed with osteopenia/osteoporosis given hip fracture in 2020. Follows up with   Ashli and is on zoledronic acid, most recent does in March 17, 2022.   Follow up bone density scan is brandie. Will continue zoledronic acid, due at this time, will schedule.     Aromatase inhibitor arthralgias   Continue supportive care. She declined acupuncture referral at this time.     Cough  Abnormal exam, will obtain a chest x ray. She is a non smoker. Further work up as clinically indicated.          ECOG SCORE    0 - Fully active-able to carry on all pre-disease performance without restriction           Discussion:   No follow-ups on file.    Plan was discussed with the patient at length, and she verbalized understanding. Bhavna was given an opportunity to ask questions that were answered to her satisfaction, and she was advised to call in the interval if any problems or questions arise.    Electronically signed by Millie Marcelino MD      Route Chart for Scheduling    Med Onc Chart Routing  Urgent    Follow up with physician 3 months.   Follow up with LATOYA    Infusion scheduling note zometa asap, was due 9/16   Injection scheduling note    Labs    Imaging   Chest x ray today   Pharmacy appointment    Other referrals          Therapy Plan Information  Medications  zoledronic acid (ZOMETA) 4 mg in sodium chloride 0.9% 100 mL IVPB  4 mg, Intravenous, Every 24 weeks  Flushes  sodium chloride 0.9% 250 mL flush bag  Intravenous, Every 24 weeks  sodium chloride 0.9% flush 10 mL  10 mL, Intravenous, Every 24 weeks  heparin, porcine (PF) 100 unit/mL injection flush 500 Units  500 Units, Intravenous, Every 24 weeks  alteplase injection 2 mg  2 mg, Intra-Catheter, Every 24 weeks

## 2022-09-28 ENCOUNTER — TELEPHONE (OUTPATIENT)
Dept: HEMATOLOGY/ONCOLOGY | Facility: CLINIC | Age: 67
End: 2022-09-28
Payer: MEDICARE

## 2022-09-28 NOTE — TELEPHONE ENCOUNTER
Spoke to patient, relayed results from chest xray. Patient verbalized understanding and thanked nurse

## 2022-09-28 NOTE — TELEPHONE ENCOUNTER
"----- Message from Janneth Valencia sent at 9/28/2022 11:45 AM CDT -----  Regarding: speak with office  Contact: maribel  Consult/Advisory:           Name Of Caller:Maribel    Contact Preference?:[phone] (home)              Does patient feel the need to be seen today? No           What is the nature of the call?: speak with farzaneh or dr mack           Additional Notes:  "Thank you for all that you do for our patients'"     "

## 2022-09-28 NOTE — PROGRESS NOTES
Please let her know that her x ray is normal and that she should contact her PMD in case of persistent symptoms. Thanks

## 2022-09-29 ENCOUNTER — OFFICE VISIT (OUTPATIENT)
Dept: DERMATOLOGY | Facility: CLINIC | Age: 67
End: 2022-09-29
Payer: MEDICARE

## 2022-09-29 VITALS — WEIGHT: 108 LBS | BODY MASS INDEX: 19.75 KG/M2

## 2022-09-29 DIAGNOSIS — L81.4 LENTIGINES: ICD-10-CM

## 2022-09-29 DIAGNOSIS — L57.0 MULTIPLE ACTINIC KERATOSES: ICD-10-CM

## 2022-09-29 DIAGNOSIS — Z85.828 HISTORY OF SKIN CANCER: ICD-10-CM

## 2022-09-29 DIAGNOSIS — L82.1 SK (SEBORRHEIC KERATOSIS): Primary | ICD-10-CM

## 2022-09-29 PROCEDURE — 17003 DESTRUCTION, PREMALIGNANT LESIONS; SECOND THROUGH 14 LESIONS: ICD-10-PCS | Mod: S$GLB,,, | Performed by: DERMATOLOGY

## 2022-09-29 PROCEDURE — 17000 DESTRUCT PREMALG LESION: CPT | Mod: S$GLB,,, | Performed by: DERMATOLOGY

## 2022-09-29 PROCEDURE — 1160F PR REVIEW ALL MEDS BY PRESCRIBER/CLIN PHARMACIST DOCUMENTED: ICD-10-PCS | Mod: CPTII,S$GLB,, | Performed by: DERMATOLOGY

## 2022-09-29 PROCEDURE — 3008F BODY MASS INDEX DOCD: CPT | Mod: CPTII,S$GLB,, | Performed by: DERMATOLOGY

## 2022-09-29 PROCEDURE — 99999 PR PBB SHADOW E&M-EST. PATIENT-LVL II: CPT | Mod: PBBFAC,,, | Performed by: DERMATOLOGY

## 2022-09-29 PROCEDURE — 1160F RVW MEDS BY RX/DR IN RCRD: CPT | Mod: CPTII,S$GLB,, | Performed by: DERMATOLOGY

## 2022-09-29 PROCEDURE — 99214 OFFICE O/P EST MOD 30 MIN: CPT | Mod: 25,S$GLB,, | Performed by: DERMATOLOGY

## 2022-09-29 PROCEDURE — 1126F AMNT PAIN NOTED NONE PRSNT: CPT | Mod: CPTII,S$GLB,, | Performed by: DERMATOLOGY

## 2022-09-29 PROCEDURE — 4010F ACE/ARB THERAPY RXD/TAKEN: CPT | Mod: CPTII,S$GLB,, | Performed by: DERMATOLOGY

## 2022-09-29 PROCEDURE — 1101F PR PT FALLS ASSESS DOC 0-1 FALLS W/OUT INJ PAST YR: ICD-10-PCS | Mod: CPTII,S$GLB,, | Performed by: DERMATOLOGY

## 2022-09-29 PROCEDURE — 3288F PR FALLS RISK ASSESSMENT DOCUMENTED: ICD-10-PCS | Mod: CPTII,S$GLB,, | Performed by: DERMATOLOGY

## 2022-09-29 PROCEDURE — 99999 PR PBB SHADOW E&M-EST. PATIENT-LVL II: ICD-10-PCS | Mod: PBBFAC,,, | Performed by: DERMATOLOGY

## 2022-09-29 PROCEDURE — 17003 DESTRUCT PREMALG LES 2-14: CPT | Mod: S$GLB,,, | Performed by: DERMATOLOGY

## 2022-09-29 PROCEDURE — 99214 PR OFFICE/OUTPT VISIT, EST, LEVL IV, 30-39 MIN: ICD-10-PCS | Mod: 25,S$GLB,, | Performed by: DERMATOLOGY

## 2022-09-29 PROCEDURE — 1159F PR MEDICATION LIST DOCUMENTED IN MEDICAL RECORD: ICD-10-PCS | Mod: CPTII,S$GLB,, | Performed by: DERMATOLOGY

## 2022-09-29 PROCEDURE — 1126F PR PAIN SEVERITY QUANTIFIED, NO PAIN PRESENT: ICD-10-PCS | Mod: CPTII,S$GLB,, | Performed by: DERMATOLOGY

## 2022-09-29 PROCEDURE — 3288F FALL RISK ASSESSMENT DOCD: CPT | Mod: CPTII,S$GLB,, | Performed by: DERMATOLOGY

## 2022-09-29 PROCEDURE — 1159F MED LIST DOCD IN RCRD: CPT | Mod: CPTII,S$GLB,, | Performed by: DERMATOLOGY

## 2022-09-29 PROCEDURE — 17000 PR DESTRUCTION(LASER SURGERY,CRYOSURGERY,CHEMOSURGERY),PREMALIGNANT LESIONS,FIRST LESION: ICD-10-PCS | Mod: S$GLB,,, | Performed by: DERMATOLOGY

## 2022-09-29 PROCEDURE — 4010F PR ACE/ARB THEARPY RXD/TAKEN: ICD-10-PCS | Mod: CPTII,S$GLB,, | Performed by: DERMATOLOGY

## 2022-09-29 PROCEDURE — 3008F PR BODY MASS INDEX (BMI) DOCUMENTED: ICD-10-PCS | Mod: CPTII,S$GLB,, | Performed by: DERMATOLOGY

## 2022-09-29 PROCEDURE — 1101F PT FALLS ASSESS-DOCD LE1/YR: CPT | Mod: CPTII,S$GLB,, | Performed by: DERMATOLOGY

## 2022-09-29 NOTE — PROGRESS NOTES
Subjective:       Patient ID:  Bhavna Landry is a 67 y.o. female who presents for   Chief Complaint   Patient presents with    Spot     Left forehead  right  hip     Would like skin check, new spots on forehead and right hip.      Spot - Initial  Affected locations: left eye  Signs / symptoms: bruising  Aggravated by: nothing  Relieving factors/Treatments tried: nothing    Review of Systems   Constitutional:  Negative for fever, chills, weight loss, weight gain, fatigue, night sweats and malaise.   Skin:  Negative for daily sunscreen use, activity-related sunscreen use and wears hat.   Hematologic/Lymphatic: Bruises/bleeds easily.      Objective:    Physical Exam   Constitutional: She appears well-developed and well-nourished. No distress.   Neurological: She is alert and oriented to person, place, and time. She is not disoriented.   Psychiatric: She has a normal mood and affect.   Skin:   Areas Examined (abnormalities noted in diagram):   Head / Face Inspection Performed  Neck Inspection Performed  Chest / Axilla Inspection Performed  Back Inspection Performed  RUE Inspected  LUE Inspection Performed                 Diagram Legend     Erythematous scaling macule/papule c/w actinic keratosis       Vascular papule c/w angioma      Pigmented verrucoid papule/plaque c/w seborrheic keratosis      Yellow umbilicated papule c/w sebaceous hyperplasia      Irregularly shaped tan macule c/w lentigo     1-2 mm smooth white papules consistent with Milia      Movable subcutaneous cyst with punctum c/w epidermal inclusion cyst      Subcutaneous movable cyst c/w pilar cyst      Firm pink to brown papule c/w dermatofibroma      Pedunculated fleshy papule(s) c/w skin tag(s)      Evenly pigmented macule c/w junctional nevus     Mildly variegated pigmented, slightly irregular-bordered macule c/w mildly atypical nevus      Flesh colored to evenly pigmented papule c/w intradermal nevus       Pink pearly papule/plaque c/w basal cell  "carcinoma      Erythematous hyperkeratotic cursted plaque c/w SCC      Surgical scar with no sign of skin cancer recurrence      Open and closed comedones      Inflammatory papules and pustules      Verrucoid papule consistent consistent with wart     Erythematous eczematous patches and plaques     Dystrophic onycholytic nail with subungual debris c/w onychomycosis     Umbilicated papule    Erythematous-base heme-crusted tan verrucoid plaque consistent with inflamed seborrheic keratosis     Erythematous Silvery Scaling Plaque c/w Psoriasis     See annotation      Assessment / Plan:        SK (seborrheic keratosis)  reassurance  Brochure provided      Lentigines  The "ABCD" rules to observe pigmented lesions were reviewed.  sunscreen    History of skin cancer  Comments:  scc right arm removed mohs surgery  Area(s) of previous NMSC evaluated with no signs of recurrence.     No lesions suspicious for malignancy noted.    Recommend daily sun protection/avoidance and use of at least SPF 30, broad spectrum sunscreen (OTC drug).       Multiple actinic keratoses   Cryosurgery Procedure Note    Verbal consent from the patient is obtained and the patient is aware of the precancerous quality and need for treatment of these lesions. Liquid nitrogen cryosurgery is applied to the 2 actinic keratoses, as detailed in the physical exam, to produce a freeze injury.             Follow up in about 6 months (around 3/29/2023).  "

## 2022-10-04 ENCOUNTER — TELEPHONE (OUTPATIENT)
Dept: HEMATOLOGY/ONCOLOGY | Facility: CLINIC | Age: 67
End: 2022-10-04
Payer: MEDICARE

## 2022-10-04 NOTE — TELEPHONE ENCOUNTER
"----- Message from Chika Mcarthur sent at 10/4/2022  1:16 PM CDT -----  Contact: PHUONG HAZEL [[id number]]  Pt is calling to find out if the orders were put in for infusion therapy of "Reclast" not Chemo. Pt needs a call back [phone].     "

## 2022-10-05 ENCOUNTER — TELEPHONE (OUTPATIENT)
Dept: HEMATOLOGY/ONCOLOGY | Facility: CLINIC | Age: 67
End: 2022-10-05
Payer: MEDICARE

## 2022-10-05 ENCOUNTER — OFFICE VISIT (OUTPATIENT)
Dept: URGENT CARE | Facility: CLINIC | Age: 67
End: 2022-10-05
Payer: MEDICARE

## 2022-10-05 VITALS
HEIGHT: 62 IN | HEART RATE: 71 BPM | SYSTOLIC BLOOD PRESSURE: 140 MMHG | OXYGEN SATURATION: 97 % | DIASTOLIC BLOOD PRESSURE: 64 MMHG | BODY MASS INDEX: 19.88 KG/M2 | WEIGHT: 108 LBS | TEMPERATURE: 98 F | RESPIRATION RATE: 17 BRPM

## 2022-10-05 DIAGNOSIS — K05.10 GUM INFLAMMATION: ICD-10-CM

## 2022-10-05 DIAGNOSIS — L03.211 FACIAL CELLULITIS: Primary | ICD-10-CM

## 2022-10-05 PROCEDURE — 96372 THER/PROPH/DIAG INJ SC/IM: CPT | Mod: S$GLB,,, | Performed by: FAMILY MEDICINE

## 2022-10-05 PROCEDURE — 3008F PR BODY MASS INDEX (BMI) DOCUMENTED: ICD-10-PCS | Mod: CPTII,S$GLB,, | Performed by: PHYSICIAN ASSISTANT

## 2022-10-05 PROCEDURE — 3078F PR MOST RECENT DIASTOLIC BLOOD PRESSURE < 80 MM HG: ICD-10-PCS | Mod: CPTII,S$GLB,, | Performed by: PHYSICIAN ASSISTANT

## 2022-10-05 PROCEDURE — 3078F DIAST BP <80 MM HG: CPT | Mod: CPTII,S$GLB,, | Performed by: PHYSICIAN ASSISTANT

## 2022-10-05 PROCEDURE — 3008F BODY MASS INDEX DOCD: CPT | Mod: CPTII,S$GLB,, | Performed by: PHYSICIAN ASSISTANT

## 2022-10-05 PROCEDURE — 3077F SYST BP >= 140 MM HG: CPT | Mod: CPTII,S$GLB,, | Performed by: PHYSICIAN ASSISTANT

## 2022-10-05 PROCEDURE — 4010F PR ACE/ARB THEARPY RXD/TAKEN: ICD-10-PCS | Mod: CPTII,S$GLB,, | Performed by: PHYSICIAN ASSISTANT

## 2022-10-05 PROCEDURE — 1160F PR REVIEW ALL MEDS BY PRESCRIBER/CLIN PHARMACIST DOCUMENTED: ICD-10-PCS | Mod: CPTII,S$GLB,, | Performed by: PHYSICIAN ASSISTANT

## 2022-10-05 PROCEDURE — 1159F MED LIST DOCD IN RCRD: CPT | Mod: CPTII,S$GLB,, | Performed by: PHYSICIAN ASSISTANT

## 2022-10-05 PROCEDURE — 96372 PR INJECTION,THERAP/PROPH/DIAG2ST, IM OR SUBCUT: ICD-10-PCS | Mod: S$GLB,,, | Performed by: FAMILY MEDICINE

## 2022-10-05 PROCEDURE — 1125F PR PAIN SEVERITY QUANTIFIED, PAIN PRESENT: ICD-10-PCS | Mod: CPTII,S$GLB,, | Performed by: PHYSICIAN ASSISTANT

## 2022-10-05 PROCEDURE — 4010F ACE/ARB THERAPY RXD/TAKEN: CPT | Mod: CPTII,S$GLB,, | Performed by: PHYSICIAN ASSISTANT

## 2022-10-05 PROCEDURE — 3077F PR MOST RECENT SYSTOLIC BLOOD PRESSURE >= 140 MM HG: ICD-10-PCS | Mod: CPTII,S$GLB,, | Performed by: PHYSICIAN ASSISTANT

## 2022-10-05 PROCEDURE — 1159F PR MEDICATION LIST DOCUMENTED IN MEDICAL RECORD: ICD-10-PCS | Mod: CPTII,S$GLB,, | Performed by: PHYSICIAN ASSISTANT

## 2022-10-05 PROCEDURE — 1160F RVW MEDS BY RX/DR IN RCRD: CPT | Mod: CPTII,S$GLB,, | Performed by: PHYSICIAN ASSISTANT

## 2022-10-05 PROCEDURE — 99213 PR OFFICE/OUTPT VISIT, EST, LEVL III, 20-29 MIN: ICD-10-PCS | Mod: 25,S$GLB,, | Performed by: PHYSICIAN ASSISTANT

## 2022-10-05 PROCEDURE — 99213 OFFICE O/P EST LOW 20 MIN: CPT | Mod: 25,S$GLB,, | Performed by: PHYSICIAN ASSISTANT

## 2022-10-05 PROCEDURE — 1125F AMNT PAIN NOTED PAIN PRSNT: CPT | Mod: CPTII,S$GLB,, | Performed by: PHYSICIAN ASSISTANT

## 2022-10-05 RX ORDER — AMOXICILLIN AND CLAVULANATE POTASSIUM 875; 125 MG/1; MG/1
1 TABLET, FILM COATED ORAL 2 TIMES DAILY
Qty: 14 TABLET | Refills: 0 | Status: SHIPPED | OUTPATIENT
Start: 2022-10-05 | End: 2022-10-11

## 2022-10-05 RX ORDER — LIDOCAINE HYDROCHLORIDE 10 MG/ML
1 INJECTION INFILTRATION; PERINEURAL
Status: COMPLETED | OUTPATIENT
Start: 2022-10-05 | End: 2022-10-05

## 2022-10-05 RX ORDER — CEFTRIAXONE 1 G/1
1 INJECTION, POWDER, FOR SOLUTION INTRAMUSCULAR; INTRAVENOUS
Status: COMPLETED | OUTPATIENT
Start: 2022-10-05 | End: 2022-10-05

## 2022-10-05 RX ADMIN — CEFTRIAXONE 1 G: 1 INJECTION, POWDER, FOR SOLUTION INTRAMUSCULAR; INTRAVENOUS at 12:10

## 2022-10-05 RX ADMIN — LIDOCAINE HYDROCHLORIDE 1 ML: 10 INJECTION INFILTRATION; PERINEURAL at 12:10

## 2022-10-05 NOTE — PROGRESS NOTES
"Subjective:       Patient ID: Bhavna Landry is a 67 y.o. female.    Vitals:  height is 5' 2" (1.575 m) and weight is 49 kg (108 lb). Her oral temperature is 97.7 °F (36.5 °C). Her blood pressure is 140/64 (abnormal) and her pulse is 71. Her respiration is 17 and oxygen saturation is 97%.     Chief Complaint: Facial Swelling    Ms. Landry with left facial swelling that she 1st noticed this morning.  She reports her cheek is very tender to touch.  She has not had fever or chills at home.  She does report that she wears dentures and when she put her dentures and yesterday, the top left gums were very sore.  They are very sore this morning as well.  She denies any left eye pain, pain with eye movements or vision loss.  She denies any known trauma.  She denies any headache, congestion, sinus symptoms.  She took Tylenol this morning with little relief in the pain.    Other  This is a new problem. The current episode started today. The problem occurs constantly. The problem has been unchanged. Pertinent negatives include no abdominal pain, anorexia, arthralgias, change in bowel habit, chest pain, chills, congestion, coughing, diaphoresis, fatigue, fever, headaches, joint swelling, myalgias, nausea, neck pain, numbness, rash, sore throat, swollen glands, urinary symptoms, vertigo, visual change, vomiting or weakness. Nothing aggravates the symptoms. She has tried nothing for the symptoms.     Constitution: Negative for chills, sweating, fatigue and fever.   HENT:  Positive for dental problem and facial swelling. Negative for facial trauma, congestion and sore throat.    Neck: Negative for neck pain.   Cardiovascular:  Negative for chest pain.   Respiratory:  Negative for cough.    Gastrointestinal:  Negative for abdominal pain, nausea and vomiting.   Musculoskeletal:  Negative for joint pain, joint swelling and muscle ache.   Skin:  Negative for rash.   Neurological:  Negative for history of vertigo, headaches and " numbness.     Objective:      Physical Exam   Constitutional: She is oriented to person, place, and time. She appears well-developed. She is cooperative.  Non-toxic appearance. She does not appear ill. No distress.   HENT:   Head: Normocephalic and atraumatic.       Ears:   Right Ear: Hearing, tympanic membrane, external ear and ear canal normal.   Left Ear: Hearing, tympanic membrane, external ear and ear canal normal.   Nose: Nose normal. No mucosal edema, rhinorrhea or nasal deformity. No epistaxis. Right sinus exhibits no maxillary sinus tenderness and no frontal sinus tenderness. Left sinus exhibits no maxillary sinus tenderness and no frontal sinus tenderness.   Mouth/Throat: Uvula is midline, oropharynx is clear and moist and mucous membranes are normal. No trismus in the jaw. Normal dentition. No uvula swelling. No oropharyngeal exudate, posterior oropharyngeal edema or posterior oropharyngeal erythema.   Patient is edentulous.  She does have tenderness to her gums on the upper left gum.  No visualized abscess.  Managing secretions without difficulty.  Voice is normal.  No trismus.  No Kavin's.            Comments: Patient is edentulous.  She does have tenderness to her gums on the upper left gum.  No visualized abscess.  Managing secretions without difficulty.  Voice is normal.  No trismus.  No Kavin's.        Eyes: Conjunctivae and lids are normal. Lids are everted and swept, no foreign bodies found. No visual field deficit is present. Right eye exhibits no discharge and no hordeolum. No foreign body present in the right eye. Left eye exhibits no discharge and no hordeolum. No foreign body present in the left eye. Right conjunctiva is not injected. Left conjunctiva is not injected. No scleral icterus. Right eye exhibits normal extraocular motion and no nystagmus. Left eye exhibits normal extraocular motion and no nystagmus. Extraocular movement intact vision grossly intact gaze aligned appropriately  periorbital hyperpigmentation  Neck: Trachea normal and phonation normal. Neck supple. No edema present. No erythema present. No neck rigidity present.   Cardiovascular: Normal rate, regular rhythm, normal heart sounds and normal pulses.   Pulmonary/Chest: Effort normal and breath sounds normal. No respiratory distress. She has no decreased breath sounds. She has no rhonchi.   Abdominal: Normal appearance.   Musculoskeletal: Normal range of motion.         General: No deformity. Normal range of motion.   Neurological: She is alert and oriented to person, place, and time. She exhibits normal muscle tone. Coordination normal.   Skin: Skin is warm, dry, intact, not diaphoretic and not pale.   Psychiatric: Her speech is normal and behavior is normal. Judgment and thought content normal.   Nursing note and vitals reviewed.      Assessment:       1. Facial cellulitis    2. Gum inflammation          Plan:         Facial cellulitis    Gum inflammation    Other orders  -     cefTRIAXone injection 1 g  -     LIDOcaine HCL 10 mg/ml (1%) injection 1 mL  -     amoxicillin-clavulanate 875-125mg (AUGMENTIN) 875-125 mg per tablet; Take 1 tablet by mouth 2 (two) times daily. for 7 days  Dispense: 14 tablet; Refill: 0    Diagnoses and plan discussed with the patient, as well as the expected course and duration of her symptoms. All questions and concerns were addressed prior to discharge.  She was advised to follow up with her PCP within 1 week if symptoms do not improve. Emergency department precautions were given. Patient verbalized understanding and was happy with the plan of care.   Note dictated with voice recognition software, please excuse any grammatical errors.    Patient Instructions   PLEASE READ YOUR DISCHARGE INSTRUCTIONS ENTIRELY AS IT CONTAINS IMPORTANT INFORMATION.  Please do not start taking the antibiotic pills until tomorrow.  If you start running high fever or the facial swelling gets worse, please go to the ER.  -  Rest.    - Drink plenty of fluids.    - Tylenol or Ibuprofen as directed as needed for fever/pain.    - If you were prescribed antibiotics, please take them to completion.  - If you are female and on birth control pills - please use additional methods of contraception to prevent pregnancy while on antibiotics and for one cycle after.   - If you were prescribed a narcotic medication, a cough syrup, or a muscle relaxer, do not drive or operate heavy equipment or machinery while taking these medications, as they can cause drowsiness.   - If a referral to a specialty was made today, you should receive a phone call in the next few days to schedule an appt.  Please call [phone] to schedule an appt if have not gotten a phone call in the next few days.  - If you smoke, please stop smoking.  -You must understand that you've received an Urgent Care treatment only and that you may be released before all your medical problems are known or treated. You, the patient, will arrange for follow up care as instructed. Please arrange follow up with your primary medical clinic as soon as possible.   - Follow up with your PCP or specialty clinic as directed in the next 1-2 weeks if not improved or as needed.  You can call [phone] to schedule an appointment with the appropriate provider.    - Please return to Urgent Care or to the Emergency Department if your symptoms worsen.    Patient aware and verbalized understanding.

## 2022-10-05 NOTE — PATIENT INSTRUCTIONS
PLEASE READ YOUR DISCHARGE INSTRUCTIONS ENTIRELY AS IT CONTAINS IMPORTANT INFORMATION.  Please do not start taking the antibiotic pills until tomorrow.  If you start running high fever or the facial swelling gets worse, please go to the ER.  - Rest.    - Drink plenty of fluids.    - Tylenol or Ibuprofen as directed as needed for fever/pain.    - If you were prescribed antibiotics, please take them to completion.  - If you are female and on birth control pills - please use additional methods of contraception to prevent pregnancy while on antibiotics and for one cycle after.   - If you were prescribed a narcotic medication, a cough syrup, or a muscle relaxer, do not drive or operate heavy equipment or machinery while taking these medications, as they can cause drowsiness.   - If a referral to a specialty was made today, you should receive a phone call in the next few days to schedule an appt.  Please call [phone] to schedule an appt if have not gotten a phone call in the next few days.  - If you smoke, please stop smoking.  -You must understand that you've received an Urgent Care treatment only and that you may be released before all your medical problems are known or treated. You, the patient, will arrange for follow up care as instructed. Please arrange follow up with your primary medical clinic as soon as possible.   - Follow up with your PCP or specialty clinic as directed in the next 1-2 weeks if not improved or as needed.  You can call [phone] to schedule an appointment with the appropriate provider.    - Please return to Urgent Care or to the Emergency Department if your symptoms worsen.    Patient aware and verbalized understanding.

## 2022-10-06 ENCOUNTER — CLINICAL SUPPORT (OUTPATIENT)
Dept: ENDOSCOPY | Facility: HOSPITAL | Age: 67
End: 2022-10-06
Attending: INTERNAL MEDICINE
Payer: MEDICARE

## 2022-10-06 ENCOUNTER — TELEPHONE (OUTPATIENT)
Dept: INTERNAL MEDICINE | Facility: CLINIC | Age: 67
End: 2022-10-06
Payer: MEDICARE

## 2022-10-06 VITALS — HEIGHT: 63 IN | BODY MASS INDEX: 19.14 KG/M2 | WEIGHT: 108 LBS

## 2022-10-06 DIAGNOSIS — Z86.010 HISTORY OF ADENOMATOUS POLYP OF COLON: ICD-10-CM

## 2022-10-06 NOTE — TELEPHONE ENCOUNTER
----- Message from Ana Lorenzana sent at 10/6/2022  9:08 AM CDT -----  Contact: [phone]  Pt is asking for a call back she would like to know if the dr will be in tomorrow please advise

## 2022-10-06 NOTE — TELEPHONE ENCOUNTER
Gums infected her sinus she is taking 7 days of amoxicillin was advised to got to ER if symptoms aren't better in 2 days she declined.

## 2022-10-06 NOTE — PLAN OF CARE
Endoscopy procedure scheduled on 10/28/22 with Dr. Rolo Pope , prep instructions reviewed, Pt verbalized understanding.

## 2022-10-06 NOTE — TELEPHONE ENCOUNTER
----- Message from Maricel Muñoz sent at 10/6/2022  9:21 AM CDT -----  Contact: pt [phone]  Stated that she found the information and is requesting a call.    Please call and advise.    Thank You

## 2022-10-10 ENCOUNTER — TELEPHONE (OUTPATIENT)
Dept: HEMATOLOGY/ONCOLOGY | Facility: CLINIC | Age: 67
End: 2022-10-10
Payer: MEDICARE

## 2022-10-10 NOTE — TELEPHONE ENCOUNTER
"----- Message from Kennedi Devonte sent at 10/10/2022  4:26 PM CDT -----  Consult/Advisory:           Name Of Caller: self    Contact Preference?: [phone]    What is the nature of the call?: requesting a call back in regards to infusion tomorrow. States she has a sinus infection and inquiring if she can still get infusion           Additional Notes:  "Thank you for all that you do for our patients'"     "

## 2022-10-10 NOTE — TELEPHONE ENCOUNTER
Spoke to patient, no fever, just clogged sinuses. Will come in for tx tomorrow if no fever in am.

## 2022-10-11 ENCOUNTER — TELEPHONE (OUTPATIENT)
Dept: HEMATOLOGY/ONCOLOGY | Facility: CLINIC | Age: 67
End: 2022-10-11
Payer: MEDICARE

## 2022-10-11 ENCOUNTER — OFFICE VISIT (OUTPATIENT)
Dept: INTERNAL MEDICINE | Facility: CLINIC | Age: 67
End: 2022-10-11
Payer: MEDICARE

## 2022-10-11 VITALS
OXYGEN SATURATION: 97 % | SYSTOLIC BLOOD PRESSURE: 122 MMHG | DIASTOLIC BLOOD PRESSURE: 64 MMHG | BODY MASS INDEX: 20 KG/M2 | WEIGHT: 108.69 LBS | HEIGHT: 62 IN | HEART RATE: 86 BPM

## 2022-10-11 DIAGNOSIS — Z79.899 DRUG-INDUCED IMMUNODEFICIENCY: ICD-10-CM

## 2022-10-11 DIAGNOSIS — D84.821 DRUG-INDUCED IMMUNODEFICIENCY: ICD-10-CM

## 2022-10-11 DIAGNOSIS — J32.9 BACTERIAL SINUSITIS: Primary | ICD-10-CM

## 2022-10-11 DIAGNOSIS — B96.89 BACTERIAL SINUSITIS: Primary | ICD-10-CM

## 2022-10-11 DIAGNOSIS — I10 ESSENTIAL HYPERTENSION: Chronic | ICD-10-CM

## 2022-10-11 PROCEDURE — 99214 OFFICE O/P EST MOD 30 MIN: CPT | Mod: S$GLB,,, | Performed by: INTERNAL MEDICINE

## 2022-10-11 PROCEDURE — 99999 PR PBB SHADOW E&M-EST. PATIENT-LVL II: ICD-10-PCS | Mod: PBBFAC,,, | Performed by: INTERNAL MEDICINE

## 2022-10-11 PROCEDURE — 4010F PR ACE/ARB THEARPY RXD/TAKEN: ICD-10-PCS | Mod: CPTII,S$GLB,, | Performed by: INTERNAL MEDICINE

## 2022-10-11 PROCEDURE — 99214 PR OFFICE/OUTPT VISIT, EST, LEVL IV, 30-39 MIN: ICD-10-PCS | Mod: S$GLB,,, | Performed by: INTERNAL MEDICINE

## 2022-10-11 PROCEDURE — 3288F PR FALLS RISK ASSESSMENT DOCUMENTED: ICD-10-PCS | Mod: CPTII,S$GLB,, | Performed by: INTERNAL MEDICINE

## 2022-10-11 PROCEDURE — 3008F BODY MASS INDEX DOCD: CPT | Mod: CPTII,S$GLB,, | Performed by: INTERNAL MEDICINE

## 2022-10-11 PROCEDURE — 99999 PR PBB SHADOW E&M-EST. PATIENT-LVL II: CPT | Mod: PBBFAC,,, | Performed by: INTERNAL MEDICINE

## 2022-10-11 PROCEDURE — 3078F PR MOST RECENT DIASTOLIC BLOOD PRESSURE < 80 MM HG: ICD-10-PCS | Mod: CPTII,S$GLB,, | Performed by: INTERNAL MEDICINE

## 2022-10-11 PROCEDURE — 3074F SYST BP LT 130 MM HG: CPT | Mod: CPTII,S$GLB,, | Performed by: INTERNAL MEDICINE

## 2022-10-11 PROCEDURE — 1125F AMNT PAIN NOTED PAIN PRSNT: CPT | Mod: CPTII,S$GLB,, | Performed by: INTERNAL MEDICINE

## 2022-10-11 PROCEDURE — 3008F PR BODY MASS INDEX (BMI) DOCUMENTED: ICD-10-PCS | Mod: CPTII,S$GLB,, | Performed by: INTERNAL MEDICINE

## 2022-10-11 PROCEDURE — 1160F RVW MEDS BY RX/DR IN RCRD: CPT | Mod: CPTII,S$GLB,, | Performed by: INTERNAL MEDICINE

## 2022-10-11 PROCEDURE — 99499 UNLISTED E&M SERVICE: CPT | Mod: S$GLB,,, | Performed by: INTERNAL MEDICINE

## 2022-10-11 PROCEDURE — 3074F PR MOST RECENT SYSTOLIC BLOOD PRESSURE < 130 MM HG: ICD-10-PCS | Mod: CPTII,S$GLB,, | Performed by: INTERNAL MEDICINE

## 2022-10-11 PROCEDURE — 1101F PT FALLS ASSESS-DOCD LE1/YR: CPT | Mod: CPTII,S$GLB,, | Performed by: INTERNAL MEDICINE

## 2022-10-11 PROCEDURE — 3078F DIAST BP <80 MM HG: CPT | Mod: CPTII,S$GLB,, | Performed by: INTERNAL MEDICINE

## 2022-10-11 PROCEDURE — 3288F FALL RISK ASSESSMENT DOCD: CPT | Mod: CPTII,S$GLB,, | Performed by: INTERNAL MEDICINE

## 2022-10-11 PROCEDURE — 4010F ACE/ARB THERAPY RXD/TAKEN: CPT | Mod: CPTII,S$GLB,, | Performed by: INTERNAL MEDICINE

## 2022-10-11 PROCEDURE — 1125F PR PAIN SEVERITY QUANTIFIED, PAIN PRESENT: ICD-10-PCS | Mod: CPTII,S$GLB,, | Performed by: INTERNAL MEDICINE

## 2022-10-11 PROCEDURE — 1160F PR REVIEW ALL MEDS BY PRESCRIBER/CLIN PHARMACIST DOCUMENTED: ICD-10-PCS | Mod: CPTII,S$GLB,, | Performed by: INTERNAL MEDICINE

## 2022-10-11 PROCEDURE — 1159F PR MEDICATION LIST DOCUMENTED IN MEDICAL RECORD: ICD-10-PCS | Mod: CPTII,S$GLB,, | Performed by: INTERNAL MEDICINE

## 2022-10-11 PROCEDURE — 1159F MED LIST DOCD IN RCRD: CPT | Mod: CPTII,S$GLB,, | Performed by: INTERNAL MEDICINE

## 2022-10-11 PROCEDURE — 1101F PR PT FALLS ASSESS DOC 0-1 FALLS W/OUT INJ PAST YR: ICD-10-PCS | Mod: CPTII,S$GLB,, | Performed by: INTERNAL MEDICINE

## 2022-10-11 RX ORDER — LISINOPRIL 5 MG/1
5 TABLET ORAL DAILY
Qty: 90 TABLET | Refills: 3
Start: 2022-10-11 | End: 2023-01-09 | Stop reason: SDUPTHER

## 2022-10-11 RX ORDER — AMOXICILLIN AND CLAVULANATE POTASSIUM 875; 125 MG/1; MG/1
1 TABLET, FILM COATED ORAL 2 TIMES DAILY
Qty: 20 TABLET | Refills: 0 | Status: SHIPPED | OUTPATIENT
Start: 2022-10-11 | End: 2023-01-09

## 2022-10-11 NOTE — TELEPHONE ENCOUNTER
Spoke with patient, relayed Dr Marcelino's message regarding delaying zometa due to gum infection and abx therapy. Patient verbalized understanding and thanked nurse

## 2022-10-11 NOTE — PROGRESS NOTES
Subjective:       Patient ID: Bhavna Landry is a 67 y.o. female.    Chief Complaint: gum infection     HPI  Saw  10/5- with left facial swelling and gum tenderness. She was given ceftiraxone and augmentin x 7 days.  Swelling better, but not completely.  GUm is sore.  She has upper ad lower dentures.  She was told dentures caused the infection of gum..    She started draining green from R sinus yesterday.    She c/o significant pain of L side of face.  At  provider's advice, avoiding solids, and drinking fluids instead.    She c/o fatigue, which she attributes to low BP.  BP fine now.    Occas lightheadedness.    She is immunocompromised due to taking Xeljanz.  Takes alleve 1 bid for facial pain..       Review of Systems   Constitutional:  Negative for fever and unexpected weight change.   HENT:  Negative for nasal congestion and postnasal drip.    Eyes:  Negative for pain, discharge and visual disturbance.   Respiratory:  Negative for cough, chest tightness, shortness of breath and wheezing.    Cardiovascular:  Negative for chest pain and leg swelling.   Gastrointestinal:  Negative for abdominal pain, constipation, diarrhea and nausea.   Genitourinary:  Negative for difficulty urinating, dysuria and hematuria.   Integumentary:  Negative for rash.   Neurological:  Negative for headaches.   Psychiatric/Behavioral:  Negative for dysphoric mood and sleep disturbance. The patient is not nervous/anxious.        Objective:      Physical Exam  Constitutional:       General: She is in acute distress (looks mildly uncomfortable.).      Appearance: Normal appearance. She is not ill-appearing or diaphoretic.   HENT:      Head:      Comments: Very tender over L maxillary sinus and L nostril. Mild edema of L cheek.  No erythema     Right Ear: Tympanic membrane normal.      Left Ear: Tympanic membrane normal.   Musculoskeletal:      Cervical back: No rigidity or tenderness.   Neurological:      Mental Status: She is alert.    Psychiatric:         Mood and Affect: Mood normal.         Behavior: Behavior normal.         Thought Content: Thought content normal.       Assessment:       Problem List Items Addressed This Visit       Essential hypertension (Chronic)    Drug-induced immunodeficiency     Other Visit Diagnoses       Bacterial sinusitis    -  Primary            Plan:     Bhavna was seen today for gum infection .    Diagnoses and all orders for this visit:    Bacterial sinusitis    Drug-induced immunodeficiency    Essential hypertension    Other orders  -     amoxicillin-clavulanate 875-125mg (AUGMENTIN) 875-125 mg per tablet; Take 1 tablet by mouth 2 (two) times daily.  -     lisinopriL (PRINIVIL,ZESTRIL) 5 MG tablet; Take 1 tablet (5 mg total) by mouth once daily.         Monitor BP - med list corrected.    10 more days augmentin.  ENT if not better - let me know.    See dentist if gum discomfort persists.  Appears to be more likely referred from sinusitis.

## 2022-10-13 DIAGNOSIS — Z86.010 HISTORY OF ADENOMATOUS POLYP OF COLON: Primary | ICD-10-CM

## 2022-10-19 ENCOUNTER — TELEPHONE (OUTPATIENT)
Dept: HEMATOLOGY/ONCOLOGY | Facility: CLINIC | Age: 67
End: 2022-10-19
Payer: MEDICARE

## 2022-10-19 NOTE — TELEPHONE ENCOUNTER
----- Message from Chiquis Garvin sent at 10/19/2022  3:34 PM CDT -----  Regarding: Reschedule Existing Appointment    Appt Date:  10/26/22    Type of appt : EP and Infusion    Physician: Dr. Marcelino    Reason for rescheduling?   Can only come on a Tuesday, She would like to know if she can come on 11/01/22    Caller:   Bhavna    Contact Preference:  [phone]

## 2022-10-27 ENCOUNTER — TELEPHONE (OUTPATIENT)
Dept: ENDOSCOPY | Facility: HOSPITAL | Age: 67
End: 2022-10-27
Payer: MEDICARE

## 2022-10-27 ENCOUNTER — TELEPHONE (OUTPATIENT)
Dept: INTERNAL MEDICINE | Facility: CLINIC | Age: 67
End: 2022-10-27
Payer: MEDICARE

## 2022-10-27 DIAGNOSIS — R51.9 FACIAL PAIN: ICD-10-CM

## 2022-10-27 DIAGNOSIS — Z86.010 HISTORY OF ADENOMATOUS POLYP OF COLON: ICD-10-CM

## 2022-10-27 DIAGNOSIS — Z12.11 SCREENING FOR MALIGNANT NEOPLASM OF COLON: Primary | ICD-10-CM

## 2022-10-27 DIAGNOSIS — J32.0 CHRONIC MAXILLARY SINUSITIS: Primary | ICD-10-CM

## 2022-10-27 RX ORDER — POLYETHYLENE GLYCOL 3350, SODIUM SULFATE ANHYDROUS, SODIUM BICARBONATE, SODIUM CHLORIDE, POTASSIUM CHLORIDE 236; 22.74; 6.74; 5.86; 2.97 G/4L; G/4L; G/4L; G/4L; G/4L
4 POWDER, FOR SOLUTION ORAL ONCE
Qty: 4000 ML | Refills: 0 | Status: SHIPPED | OUTPATIENT
Start: 2022-10-27 | End: 2022-10-27

## 2022-10-27 NOTE — TELEPHONE ENCOUNTER
----- Message from Maricel Muñoz sent at 10/27/2022 12:22 PM CDT -----  Contact: pt [phone]  Wants to know if you still want her to do the MRI on her sinuses and cheek bone.    Please call and advise.    Thank You

## 2022-10-27 NOTE — TELEPHONE ENCOUNTER
Endoscopy Scheduling Questionnaire:         Are you taking any blood thinners? No               If Yes  Have you been on them for longer than one year?     2. Have you been diagnosed with Diverticulitis in past three months?  No    3. Are you on dialysis or have Kidney Disease? No    4. Previous Colonoscopy?  Yes         If yes Do you have a history of colon polyps?  Yes      6. Are you a diabetic?  No    7. Do you have a history of constipation?  Yes      Procedure scheduled with Dr. Walters on  12/09/2022    The prep being used is Golytely     The patient's prep instructions were sent by [employer]

## 2022-10-28 NOTE — TELEPHONE ENCOUNTER
Are sinus symptoms any better?  Pls get more info - see my last note.    If not, pls order CT of sinuses followed by ent referral - Dr Goode, who saw her in past.

## 2022-10-28 NOTE — TELEPHONE ENCOUNTER
Pt states that the swelling did go down but it hurts to the touch. Pt is blowing out clear mucus out of her nose and chest. Pt states that she tried some OTC medication for allergies that doesn't work. I recommended that she try ones with the D. Zyrtec-D, Claritin D and/or Allegra D. Pt stated that she will give those a try. She did request an Rx but I encouraged her to give those medications a try 1st.    Pt is scheduled for the CT as well as with Dr. Goode.

## 2022-11-03 ENCOUNTER — HOSPITAL ENCOUNTER (OUTPATIENT)
Dept: RADIOLOGY | Facility: HOSPITAL | Age: 67
Discharge: HOME | End: 2022-11-03
Attending: INTERNAL MEDICINE
Payer: MEDICARE

## 2022-11-03 DIAGNOSIS — R51.9 FACIAL PAIN: ICD-10-CM

## 2022-11-03 DIAGNOSIS — J32.0 CHRONIC MAXILLARY SINUSITIS: ICD-10-CM

## 2022-11-03 PROCEDURE — 70486 CT SINUSES WITHOUT CONTRAST: ICD-10-PCS | Mod: 26,,, | Performed by: RADIOLOGY

## 2022-11-03 PROCEDURE — 70486 CT MAXILLOFACIAL W/O DYE: CPT | Mod: 26,,, | Performed by: RADIOLOGY

## 2022-11-03 PROCEDURE — 70486 CT MAXILLOFACIAL W/O DYE: CPT | Mod: TC

## 2022-11-04 ENCOUNTER — TELEPHONE (OUTPATIENT)
Dept: INTERNAL MEDICINE | Facility: CLINIC | Age: 67
End: 2022-11-04
Payer: MEDICARE

## 2022-11-04 RX ORDER — DOXYCYCLINE HYCLATE 100 MG
100 TABLET ORAL 2 TIMES DAILY
Qty: 20 TABLET | Refills: 0 | Status: SHIPPED | OUTPATIENT
Start: 2022-11-04 | End: 2022-11-14

## 2022-11-04 NOTE — TELEPHONE ENCOUNTER
Called and spoke to pt. Pt states she had CT of the sinuses yesterday and looking for PCP to read results. Pt states she is still experiencing left sided facial pain and swelling.

## 2022-11-04 NOTE — TELEPHONE ENCOUNTER
----- Message from Sameera Bravo sent at 11/4/2022 12:09 PM CDT -----  Contact: [phone] Patient  2TESTRESULTS    Type: Test Results    What test was performed? CT Non Contrast    Who ordered the test? Dr Vargas    When and where were the test performed? [facility]  11/03    Would you like response via [employer]: Please call back. Pt received her test results on the portal but does not understand results can you please call and advise.     Comments:

## 2022-11-07 ENCOUNTER — LAB VISIT (OUTPATIENT)
Dept: LAB | Facility: HOSPITAL | Age: 67
End: 2022-11-07
Attending: INTERNAL MEDICINE
Payer: MEDICARE

## 2022-11-07 DIAGNOSIS — Z17.0 CARCINOMA OF UPPER-OUTER QUADRANT OF RIGHT BREAST IN FEMALE, ESTROGEN RECEPTOR POSITIVE: ICD-10-CM

## 2022-11-07 DIAGNOSIS — C50.411 CARCINOMA OF UPPER-OUTER QUADRANT OF RIGHT BREAST IN FEMALE, ESTROGEN RECEPTOR POSITIVE: ICD-10-CM

## 2022-11-07 LAB
ALBUMIN SERPL BCP-MCNC: 3.7 G/DL (ref 3.5–5.2)
ALP SERPL-CCNC: 66 U/L (ref 55–135)
ALT SERPL W/O P-5'-P-CCNC: 19 U/L (ref 10–44)
ANION GAP SERPL CALC-SCNC: 8 MMOL/L (ref 8–16)
AST SERPL-CCNC: 26 U/L (ref 10–40)
BASOPHILS # BLD AUTO: 0.05 K/UL (ref 0–0.2)
BASOPHILS NFR BLD: 0.6 % (ref 0–1.9)
BILIRUB SERPL-MCNC: 0.3 MG/DL (ref 0.1–1)
BUN SERPL-MCNC: 7 MG/DL (ref 8–23)
CALCIUM SERPL-MCNC: 9.6 MG/DL (ref 8.7–10.5)
CHLORIDE SERPL-SCNC: 101 MMOL/L (ref 95–110)
CO2 SERPL-SCNC: 26 MMOL/L (ref 23–29)
CREAT SERPL-MCNC: 0.9 MG/DL (ref 0.5–1.4)
DIFFERENTIAL METHOD: ABNORMAL
EOSINOPHIL # BLD AUTO: 0.2 K/UL (ref 0–0.5)
EOSINOPHIL NFR BLD: 2.5 % (ref 0–8)
ERYTHROCYTE [DISTWIDTH] IN BLOOD BY AUTOMATED COUNT: 14.7 % (ref 11.5–14.5)
EST. GFR  (NO RACE VARIABLE): >60 ML/MIN/1.73 M^2
GLUCOSE SERPL-MCNC: 133 MG/DL (ref 70–110)
HCT VFR BLD AUTO: 37.9 % (ref 37–48.5)
HGB BLD-MCNC: 12.3 G/DL (ref 12–16)
IMM GRANULOCYTES # BLD AUTO: 0.02 K/UL (ref 0–0.04)
IMM GRANULOCYTES NFR BLD AUTO: 0.2 % (ref 0–0.5)
LYMPHOCYTES # BLD AUTO: 1.6 K/UL (ref 1–4.8)
LYMPHOCYTES NFR BLD: 17.8 % (ref 18–48)
MCH RBC QN AUTO: 28.8 PG (ref 27–31)
MCHC RBC AUTO-ENTMCNC: 32.5 G/DL (ref 32–36)
MCV RBC AUTO: 89 FL (ref 82–98)
MONOCYTES # BLD AUTO: 0.8 K/UL (ref 0.3–1)
MONOCYTES NFR BLD: 8.7 % (ref 4–15)
NEUTROPHILS # BLD AUTO: 6.3 K/UL (ref 1.8–7.7)
NEUTROPHILS NFR BLD: 70.2 % (ref 38–73)
NRBC BLD-RTO: 0 /100 WBC
PLATELET # BLD AUTO: 392 K/UL (ref 150–450)
PMV BLD AUTO: 9 FL (ref 9.2–12.9)
POTASSIUM SERPL-SCNC: 4.5 MMOL/L (ref 3.5–5.1)
PROT SERPL-MCNC: 6.1 G/DL (ref 6–8.4)
RBC # BLD AUTO: 4.27 M/UL (ref 4–5.4)
SODIUM SERPL-SCNC: 135 MMOL/L (ref 136–145)
WBC # BLD AUTO: 8.95 K/UL (ref 3.9–12.7)

## 2022-11-07 PROCEDURE — 36415 COLL VENOUS BLD VENIPUNCTURE: CPT | Mod: PO | Performed by: INTERNAL MEDICINE

## 2022-11-07 PROCEDURE — 80053 COMPREHEN METABOLIC PANEL: CPT | Performed by: INTERNAL MEDICINE

## 2022-11-07 PROCEDURE — 85025 COMPLETE CBC W/AUTO DIFF WBC: CPT | Performed by: INTERNAL MEDICINE

## 2022-11-08 ENCOUNTER — OFFICE VISIT (OUTPATIENT)
Dept: HEMATOLOGY/ONCOLOGY | Facility: CLINIC | Age: 67
End: 2022-11-08
Payer: MEDICARE

## 2022-11-08 ENCOUNTER — OFFICE VISIT (OUTPATIENT)
Dept: OTOLARYNGOLOGY | Facility: CLINIC | Age: 67
End: 2022-11-08
Payer: MEDICARE

## 2022-11-08 ENCOUNTER — HOSPITAL ENCOUNTER (OUTPATIENT)
Dept: RADIOLOGY | Facility: HOSPITAL | Age: 67
Discharge: HOME | End: 2022-11-08
Attending: INTERNAL MEDICINE
Payer: MEDICARE

## 2022-11-08 ENCOUNTER — INFUSION (OUTPATIENT)
Dept: INFUSION THERAPY | Facility: HOSPITAL | Age: 67
End: 2022-11-08
Attending: INTERNAL MEDICINE
Payer: MEDICARE

## 2022-11-08 VITALS
DIASTOLIC BLOOD PRESSURE: 67 MMHG | BODY MASS INDEX: 19.92 KG/M2 | OXYGEN SATURATION: 98 % | WEIGHT: 108.25 LBS | HEIGHT: 62 IN | TEMPERATURE: 98 F | RESPIRATION RATE: 16 BRPM | HEART RATE: 77 BPM | SYSTOLIC BLOOD PRESSURE: 152 MMHG

## 2022-11-08 VITALS
DIASTOLIC BLOOD PRESSURE: 70 MMHG | SYSTOLIC BLOOD PRESSURE: 128 MMHG | TEMPERATURE: 98 F | HEART RATE: 68 BPM | RESPIRATION RATE: 18 BRPM | OXYGEN SATURATION: 99 %

## 2022-11-08 VITALS — WEIGHT: 108 LBS | BODY MASS INDEX: 19.88 KG/M2 | HEIGHT: 62 IN

## 2022-11-08 DIAGNOSIS — Z17.0 CARCINOMA OF UPPER-OUTER QUADRANT OF RIGHT BREAST IN FEMALE, ESTROGEN RECEPTOR POSITIVE: Primary | ICD-10-CM

## 2022-11-08 DIAGNOSIS — M85.89 OSTEOPENIA OF MULTIPLE SITES: ICD-10-CM

## 2022-11-08 DIAGNOSIS — J32.0 CHRONIC MAXILLARY SINUSITIS: Primary | ICD-10-CM

## 2022-11-08 DIAGNOSIS — Z79.811 USE OF ANASTROZOLE: ICD-10-CM

## 2022-11-08 DIAGNOSIS — T45.1X5A AROMATASE INHIBITOR-ASSOCIATED ARTHRALGIA: ICD-10-CM

## 2022-11-08 DIAGNOSIS — R51.9 FACIAL PAIN: ICD-10-CM

## 2022-11-08 DIAGNOSIS — C50.411 CARCINOMA OF UPPER-OUTER QUADRANT OF RIGHT BREAST IN FEMALE, ESTROGEN RECEPTOR POSITIVE: Primary | ICD-10-CM

## 2022-11-08 DIAGNOSIS — M25.50 AROMATASE INHIBITOR-ASSOCIATED ARTHRALGIA: ICD-10-CM

## 2022-11-08 DIAGNOSIS — J31.0 CHRONIC RHINITIS: ICD-10-CM

## 2022-11-08 DIAGNOSIS — M85.89 OSTEOPENIA OF MULTIPLE SITES: Primary | ICD-10-CM

## 2022-11-08 DIAGNOSIS — M25.559 PAIN IN UNSPECIFIED HIP: ICD-10-CM

## 2022-11-08 PROCEDURE — 73521 X-RAY EXAM HIPS BI 2 VIEWS: CPT | Mod: TC

## 2022-11-08 PROCEDURE — 3008F PR BODY MASS INDEX (BMI) DOCUMENTED: ICD-10-PCS | Mod: CPTII,S$GLB,, | Performed by: INTERNAL MEDICINE

## 2022-11-08 PROCEDURE — 4010F ACE/ARB THERAPY RXD/TAKEN: CPT | Mod: CPTII,S$GLB,, | Performed by: INTERNAL MEDICINE

## 2022-11-08 PROCEDURE — 3077F SYST BP >= 140 MM HG: CPT | Mod: CPTII,S$GLB,, | Performed by: INTERNAL MEDICINE

## 2022-11-08 PROCEDURE — 3008F BODY MASS INDEX DOCD: CPT | Mod: CPTII,S$GLB,, | Performed by: OTOLARYNGOLOGY

## 2022-11-08 PROCEDURE — 25000003 PHARM REV CODE 250: Performed by: INTERNAL MEDICINE

## 2022-11-08 PROCEDURE — 99999 PR PBB SHADOW E&M-EST. PATIENT-LVL V: CPT | Mod: PBBFAC,,, | Performed by: INTERNAL MEDICINE

## 2022-11-08 PROCEDURE — 1101F PR PT FALLS ASSESS DOC 0-1 FALLS W/OUT INJ PAST YR: ICD-10-PCS | Mod: CPTII,S$GLB,, | Performed by: INTERNAL MEDICINE

## 2022-11-08 PROCEDURE — 99214 PR OFFICE/OUTPT VISIT, EST, LEVL IV, 30-39 MIN: ICD-10-PCS | Mod: 25,S$GLB,, | Performed by: OTOLARYNGOLOGY

## 2022-11-08 PROCEDURE — 31231 NASAL ENDOSCOPY DX: CPT | Mod: S$GLB,,, | Performed by: OTOLARYNGOLOGY

## 2022-11-08 PROCEDURE — 99214 OFFICE O/P EST MOD 30 MIN: CPT | Mod: 25,S$GLB,, | Performed by: OTOLARYNGOLOGY

## 2022-11-08 PROCEDURE — 87075 CULTR BACTERIA EXCEPT BLOOD: CPT | Performed by: OTOLARYNGOLOGY

## 2022-11-08 PROCEDURE — 1101F PR PT FALLS ASSESS DOC 0-1 FALLS W/OUT INJ PAST YR: ICD-10-PCS | Mod: CPTII,S$GLB,, | Performed by: OTOLARYNGOLOGY

## 2022-11-08 PROCEDURE — 4010F ACE/ARB THERAPY RXD/TAKEN: CPT | Mod: CPTII,S$GLB,, | Performed by: OTOLARYNGOLOGY

## 2022-11-08 PROCEDURE — 3288F FALL RISK ASSESSMENT DOCD: CPT | Mod: CPTII,S$GLB,, | Performed by: OTOLARYNGOLOGY

## 2022-11-08 PROCEDURE — 99999 PR PBB SHADOW E&M-EST. PATIENT-LVL V: ICD-10-PCS | Mod: PBBFAC,,, | Performed by: INTERNAL MEDICINE

## 2022-11-08 PROCEDURE — 1101F PT FALLS ASSESS-DOCD LE1/YR: CPT | Mod: CPTII,S$GLB,, | Performed by: OTOLARYNGOLOGY

## 2022-11-08 PROCEDURE — 3077F PR MOST RECENT SYSTOLIC BLOOD PRESSURE >= 140 MM HG: ICD-10-PCS | Mod: CPTII,S$GLB,, | Performed by: INTERNAL MEDICINE

## 2022-11-08 PROCEDURE — 1125F AMNT PAIN NOTED PAIN PRSNT: CPT | Mod: CPTII,S$GLB,, | Performed by: INTERNAL MEDICINE

## 2022-11-08 PROCEDURE — 3078F PR MOST RECENT DIASTOLIC BLOOD PRESSURE < 80 MM HG: ICD-10-PCS | Mod: CPTII,S$GLB,, | Performed by: INTERNAL MEDICINE

## 2022-11-08 PROCEDURE — 99215 OFFICE O/P EST HI 40 MIN: CPT | Mod: S$GLB,,, | Performed by: INTERNAL MEDICINE

## 2022-11-08 PROCEDURE — 87107 FUNGI IDENTIFICATION MOLD: CPT | Performed by: OTOLARYNGOLOGY

## 2022-11-08 PROCEDURE — 4010F PR ACE/ARB THEARPY RXD/TAKEN: ICD-10-PCS | Mod: CPTII,S$GLB,, | Performed by: INTERNAL MEDICINE

## 2022-11-08 PROCEDURE — 73521 X-RAY EXAM HIPS BI 2 VIEWS: CPT | Mod: 26,,, | Performed by: RADIOLOGY

## 2022-11-08 PROCEDURE — 31231 NASAL/SINUS ENDOSCOPY: ICD-10-PCS | Mod: S$GLB,,, | Performed by: OTOLARYNGOLOGY

## 2022-11-08 PROCEDURE — 99999 PR PBB SHADOW E&M-EST. PATIENT-LVL V: CPT | Mod: PBBFAC,,, | Performed by: OTOLARYNGOLOGY

## 2022-11-08 PROCEDURE — 1125F PR PAIN SEVERITY QUANTIFIED, PAIN PRESENT: ICD-10-PCS | Mod: CPTII,S$GLB,, | Performed by: INTERNAL MEDICINE

## 2022-11-08 PROCEDURE — 73521 XR HIPS BILATERAL 2 VIEW INCL AP PELVIS: ICD-10-PCS | Mod: 26,,, | Performed by: RADIOLOGY

## 2022-11-08 PROCEDURE — 1159F MED LIST DOCD IN RCRD: CPT | Mod: CPTII,S$GLB,, | Performed by: OTOLARYNGOLOGY

## 2022-11-08 PROCEDURE — 3288F PR FALLS RISK ASSESSMENT DOCUMENTED: ICD-10-PCS | Mod: CPTII,S$GLB,, | Performed by: INTERNAL MEDICINE

## 2022-11-08 PROCEDURE — 87070 CULTURE OTHR SPECIMN AEROBIC: CPT | Performed by: OTOLARYNGOLOGY

## 2022-11-08 PROCEDURE — 99215 PR OFFICE/OUTPT VISIT, EST, LEVL V, 40-54 MIN: ICD-10-PCS | Mod: S$GLB,,, | Performed by: INTERNAL MEDICINE

## 2022-11-08 PROCEDURE — 1101F PT FALLS ASSESS-DOCD LE1/YR: CPT | Mod: CPTII,S$GLB,, | Performed by: INTERNAL MEDICINE

## 2022-11-08 PROCEDURE — 63600175 PHARM REV CODE 636 W HCPCS: Performed by: INTERNAL MEDICINE

## 2022-11-08 PROCEDURE — 1125F AMNT PAIN NOTED PAIN PRSNT: CPT | Mod: CPTII,S$GLB,, | Performed by: OTOLARYNGOLOGY

## 2022-11-08 PROCEDURE — 3078F DIAST BP <80 MM HG: CPT | Mod: CPTII,S$GLB,, | Performed by: INTERNAL MEDICINE

## 2022-11-08 PROCEDURE — 1159F PR MEDICATION LIST DOCUMENTED IN MEDICAL RECORD: ICD-10-PCS | Mod: CPTII,S$GLB,, | Performed by: OTOLARYNGOLOGY

## 2022-11-08 PROCEDURE — 1125F PR PAIN SEVERITY QUANTIFIED, PAIN PRESENT: ICD-10-PCS | Mod: CPTII,S$GLB,, | Performed by: OTOLARYNGOLOGY

## 2022-11-08 PROCEDURE — 3008F PR BODY MASS INDEX (BMI) DOCUMENTED: ICD-10-PCS | Mod: CPTII,S$GLB,, | Performed by: OTOLARYNGOLOGY

## 2022-11-08 PROCEDURE — 1160F PR REVIEW ALL MEDS BY PRESCRIBER/CLIN PHARMACIST DOCUMENTED: ICD-10-PCS | Mod: CPTII,S$GLB,, | Performed by: OTOLARYNGOLOGY

## 2022-11-08 PROCEDURE — 3288F PR FALLS RISK ASSESSMENT DOCUMENTED: ICD-10-PCS | Mod: CPTII,S$GLB,, | Performed by: OTOLARYNGOLOGY

## 2022-11-08 PROCEDURE — 1160F RVW MEDS BY RX/DR IN RCRD: CPT | Mod: CPTII,S$GLB,, | Performed by: OTOLARYNGOLOGY

## 2022-11-08 PROCEDURE — 99499 UNLISTED E&M SERVICE: CPT | Mod: S$GLB,,, | Performed by: INTERNAL MEDICINE

## 2022-11-08 PROCEDURE — 96365 THER/PROPH/DIAG IV INF INIT: CPT

## 2022-11-08 PROCEDURE — 99499 RISK ADDL DX/OHS AUDIT: ICD-10-PCS | Mod: S$GLB,,, | Performed by: INTERNAL MEDICINE

## 2022-11-08 PROCEDURE — 4010F PR ACE/ARB THEARPY RXD/TAKEN: ICD-10-PCS | Mod: CPTII,S$GLB,, | Performed by: OTOLARYNGOLOGY

## 2022-11-08 PROCEDURE — 3288F FALL RISK ASSESSMENT DOCD: CPT | Mod: CPTII,S$GLB,, | Performed by: INTERNAL MEDICINE

## 2022-11-08 PROCEDURE — 99999 PR PBB SHADOW E&M-EST. PATIENT-LVL V: ICD-10-PCS | Mod: PBBFAC,,, | Performed by: OTOLARYNGOLOGY

## 2022-11-08 PROCEDURE — 3008F BODY MASS INDEX DOCD: CPT | Mod: CPTII,S$GLB,, | Performed by: INTERNAL MEDICINE

## 2022-11-08 RX ORDER — SODIUM CHLORIDE 0.9 % (FLUSH) 0.9 %
10 SYRINGE (ML) INJECTION
Status: CANCELLED | OUTPATIENT
Start: 2022-12-22

## 2022-11-08 RX ORDER — SODIUM CHLORIDE 0.9 % (FLUSH) 0.9 %
10 SYRINGE (ML) INJECTION
Status: DISCONTINUED | OUTPATIENT
Start: 2022-11-08 | End: 2022-11-08 | Stop reason: HOSPADM

## 2022-11-08 RX ORDER — HEPARIN 100 UNIT/ML
500 SYRINGE INTRAVENOUS
Status: CANCELLED | OUTPATIENT
Start: 2022-12-22

## 2022-11-08 RX ORDER — ZOLEDRONIC ACID 0.04 MG/ML
4 INJECTION, SOLUTION INTRAVENOUS
Status: CANCELLED | OUTPATIENT
Start: 2022-12-22

## 2022-11-08 RX ORDER — FLUTICASONE PROPIONATE 50 MCG
2 SPRAY, SUSPENSION (ML) NASAL DAILY
Qty: 16 G | Refills: 2 | Status: SHIPPED | OUTPATIENT
Start: 2022-11-08 | End: 2023-02-06

## 2022-11-08 RX ADMIN — SODIUM CHLORIDE: 9 INJECTION, SOLUTION INTRAVENOUS at 01:11

## 2022-11-08 RX ADMIN — ZOLEDRONIC ACID 3.3 MG: 4 INJECTION INTRAVENOUS at 02:11

## 2022-11-08 NOTE — PROGRESS NOTES
"  Subjective:      Bhavna is a 67 y.o. female who comes for follow-up of sinusitis.  Her last visit with me was on 5/25/2021.  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   Fine until 4 weeks ago, had been breathing and feeling well, then had return of right nasal congestion and new feeling of swelling in the left cheek, with additional congestion and a feeling that she needs to press on her medial left cheek to let air in and let the sinus drain.  No ear pain, purulent discharge, hyposmia.  Took 10 days of augmentin without much improvement, then was just given a prescription for doxycycline but has not started that yet.        %       The patient's medications, allergies, past medical, surgical, social and family histories were reviewed and updated as appropriate.    A detailed review of systems was obtained with pertinent positives as per the above HPI, and otherwise negative.        Objective:     Ht 5' 2" (1.575 m)   Wt 49 kg (108 lb)   BMI 19.75 kg/m²        Constitutional:   She appears well-developed. She is cooperative. Normal speech.  No hoarse voice.      Head:  Normocephalic. Salivary glands normal.  Facial strength is normal.      Ears:    Right Ear: No drainage or tenderness. Tympanic membrane is not perforated. Tympanic membrane mobility is normal. No middle ear effusion. No decreased hearing is noted.   Left Ear: No drainage or tenderness. Tympanic membrane is not perforated. Tympanic membrane mobility is normal.  No middle ear effusion. No decreased hearing is noted.     Nose:  Mucosal edema present. No rhinorrhea, septal deviation or polyps. No epistaxis. Turbinates normal, no turbinate masses and no turbinate hypertrophy.  Right sinus exhibits no maxillary sinus tenderness and no frontal sinus tenderness. Left sinus exhibits no maxillary sinus tenderness and no frontal sinus tenderness.   Relative hypoesthesia of left cheek    Mouth/Throat  Oropharynx " clear and moist without lesions or asymmetry and normal uvula midline. She does not have dentures. Normal dentition. No oral lesions or mucous membrane lesions. No oropharyngeal exudate or posterior oropharyngeal erythema. Mirror exam not performed due to patient tolerance.  Mirror exam not performed due to patient tolerance.      Neck:  Neck normal without thyromegaly masses, asymmetry, normal tracheal structure, crepitus, and tenderness, thyroid normal, trachea normal and no adenopathy. Normal range of motion present.     She has no cervical adenopathy.     Cardiovascular:    Regular rhythm.              Pulmonary/Chest:   Effort normal.     Psychiatric:   She has a normal mood and affect. Her speech is normal and behavior is normal.     Neurological:   No cranial nerve deficit.     Skin:   No rash noted.     Procedure    Nasal endoscopy performed.  See procedure note.        Data Reviewed    WBC (K/uL)   Date Value   11/07/2022 8.95     Eosinophil % (%)   Date Value   11/07/2022 2.5     Eos, Fluid (%)   Date Value   05/05/2014 5     Eos # (K/uL)   Date Value   11/07/2022 0.2     Platelets (K/uL)   Date Value   11/07/2022 392     Glucose (mg/dL)   Date Value   11/07/2022 133 (H)     No results found for: IGE    Pathology report indicated non-eosinophilic chronic inflammation.          Assessment:     1. Chronic maxillary sinusitis    2. Facial pain         Plan:     Cultures taken, will consider different antibiotic.  Rx fluticasone spray daily.  Follow up for test results.

## 2022-11-08 NOTE — PROCEDURES
Nasal/sinus endoscopy    Date/Time: 11/8/2022 10:30 AM  Performed by: Nathaniel Goode MD  Authorized by: Nathaniel Goode MD     Consent Done?:  Yes (Verbal)  Anesthesia:     Local anesthetic:  Lidocaine 4% and Chad-Synephrine 1/2%    Patient tolerance:  Patient tolerated the procedure well with no immediate complications  Nose:     Procedure Performed:  Nasal Endoscopy  External:      No external nasal deformity  Intranasal:      Mucosa no polyps     Mucosa ulcers not present     No mucosa lesions present     Turbinates not enlarged     No septum gross deformity  Nasopharynx:      No mucosa lesions     Adenoids not present     Posterior choanae patent     Eustachian tube patent     Right maxillary patent with yellow crust on MT head  Left side with mucoid PND, swabbed for culture

## 2022-11-08 NOTE — PROGRESS NOTES
CONSULT NOTE    Subjective:       Patient ID: Bhavna Landry is a 67 y.o. female.  MRN: [id number]  : 1955    Chief Complaint: Established Patient (F/u, having some bone pain in pelvic region  for about 2 wks)  pT1b N0 (i+)  grade 2 ER + LA - HER2 - IDC of the right breast.    History of Present Illness:   Bhavna Landry is a 67 y.o. female who is referred for right breast IDC.     She presented for screening mammogram on 2022 . This identified an asymmetry in the right breast. Follow-up mammogram and ultrasound on 2022 showed a 1 x 0.9 x 0.8 cm mass at the 9 o'clock position of the right breast 3 cm from the nipple. A ultrasound guided biopsy was performed on 2022 with pathology revealing invasive ductal carcinoma of the breast.     She saw Dr. Dhillon and underwent right mastectomy and sentinel node exam.  It showed IDC 8 mm, grade 2, ER strongly positive, LA negative, HER2/jayla 1+, Ki-67 12%.  There was also DCIS, multiple foci up to 8 mm, spanning a distance of 20 mm.  Nuclear grade 2. Two sentinel nodes removed, 1 had isolated tumor cells.    Oncotype DX RS 28; Chemo benefit >15%     She has declined adjuvant chemotherapy.     Started Anastrozole  22    Interim history:   She presents today for an urgent visit with new symptoms.     Her cough has resolved. Her Zometa was held for 2 weeks as she was having sinusitis and possible gum issues. Symptoms have resolved. She has completed antibiotics. She is edentulous.     She reports left hip pain and left groin pain, with recent exacerbation. She has used Biofreeze and heating pads without any benefit.  Tylenol does not help. Pain is interfering with her sleep and it hurts to walk.     She had imaging done back in  ( right hip fracture)  and was referred to Orthopedics but did not see them and instead saw her chiropractor. The pain is now much worse and on the left side.      Denies hot  "flashes, has  arthritis.       Oncology History:  1. Breast, right, mastectomy:   - Invasive ductal carcinoma       - Greatest dimension:  8 mm       - Jonathan-Alaniz modified SBR score 3 + 2 + 1 = 6 of 9       - Histologic grade 2 of 3       - Mitotic index = 0.2 (average mitoses per high power field) (low)       - Resection margin free of invasive carcinoma           - Invasive carcinoma present 9 mm to closest posterior margin, 26 mm   to inferior margin, and 45 mm to superior margin   - Biopsy clip present   - No lymphovascular invasion is identified   - Ductal carcinoma in situ       - Foci up to 8 mm in greatest dimension spanning a distance of   approximately 20 mm       - Nuclear grade ranges from 2 to 3 of 3       - Solid and cribriform patterns       - Central comedonecrosis present:  Approximately 80%       - Resection margin free of DCIS           - DCIS present present 9 mm to closest posterior margin, 26 mm to   inferior margin, and 45 mm to superior margin   - Background breast contains usual ductal hyperplasia, apocrine metaplasia,   microcysts and macrocysts, ectatic ducts, and stromal fibrosis   - Microcalcifications, calcium phosphate type, associated with DCIS and   focally with medial calcific arterial stenosis (Monckeberg's sclerosis)   - See CAP Tumor Synoptic   2. Ivel lymph node, right axillary, "#1 hot and blue count 2004,"   excision:   - 1 lymph node, isolated tumor cells present   3. Ivel lymph node, right axillary, "#2 hot and blue count 1700,"   excision:   - 1 lymph node, negative for metastatic carcinoma   CAP Tumor Synoptic:   Procedure:  Total mastectomy   Specimen laterality:  Right   Tumor site:  9 o'clock   Histologic type:  Invasive carcinoma of no special type (ductal)   Histologic grade (Fairmont histologic score):  Fairmont score 3 + 2 + 1 =   6 of 9   Glandular (acinar) / tubular differentiation:  Score 3   Nuclear pleomorphism:  Score 2   Mitotic " grade:  Score 1   Overall grade:  Grade 2   Tumor size:  8 mm   Tumor focality:  Single focus of invasive carcinoma   Ductal carcinoma in situ (DCIS):  Present (Negative for extensive intraductal   component)       Size (extent) of DCIS:  Foci up to 8 mm in greatest dimension spanning a   distance of approximately 20 mm       Number blocks with DCIS:  4       Number blocks examined:  13       Architectural patterns:  Comedo, cribriform, solid       Nuclear grade:  Ranges from grade 2 (intermediate) to grade 3 (high)   Lobular carcinoma in situ (LCIS):  Not identified   Tumor extent:  Not applicable   Lymphovascular invasion:  Not identified   Dermal lymphovascular invasion:  Not identified   Microcalcifications:  Present in DCIS and nonneoplastic tissue   Treatment effect in the breast:  No known pre-surgical therapy   Treatment effect in the lymph nodes:  Not applicable   Margin status for invasive carcinoma:  All margins negative for invasive   carcinoma       Distance from invasive carcinoma closest posterior margin:  9 mm       Distance from invasive carcinoma to inferior margin:  26 mm       Distance from invasive carcinoma to superior margin:  45 mm   Margin status for DCIS:  All margins negative for DCIS       Distant from DCIS to closest posterior margin:  9 mm       Distance from DCIS to inferior margin:  26 mm       Distance from DCIS to superior margin:  45 mm   Regional lymph node status:  Tumor present in regional lymph nodes   Number of lymph nodes with macrometastases:  0   Number of lymph nodes with micrometastases:  0   Number of lymph nodes with isolated tumor cells:  1   Size of largest jose l metastatic deposit:  0.13 mm   Extranodal extension:  Not identified   Total number of lymph nodes examined (sentinel and nonsentinel):  2   Number of sentinel nodes examined:  2   Distant sites involved:  Not applicable   TNM descriptors:  Not applicable   pT category:  pT1b   Regional lymph nodes modifier:   (sn)   pN category:  (sn) pN0 (i+)   pM category:  Not applicable - pM cannot be determined from the submitted   specimens   Special studies:  Biomarkers, assessed by immunohistochemistry on prior right   breast biopsy ([facility] with following reported results:       - Estrogen Receptor (ER):  Positive (100%; strong intensity)       - Progesterone Receptor (KS):  Negative (<1%)       - HER2:  Negative (1+)       - Ki-67 proliferation index:  12% (low)       Oncology History   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, estrogen receptor positive   4/13/2022 Initial Diagnosis    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, estrogen receptor positive     4/13/2022 Cancer Staged    Staging form: Breast, AJCC 8th Edition  - Clinical stage from 4/13/2022: Stage IA (cT1b, cN0, cM0, G2, ER+, KS-, HER2-)       7/19/2022 - 7/19/2022 Chemotherapy    Treatment Summary   Plan Name: OP BREAST TC - DOCETAXEL CYCLOPHOSPHAMIDE Q3W  Treatment Goal: Curative  Status: Inactive  Start Date:   End Date:   Provider: Idania Martins MD  Chemotherapy: cyclophosphamide 600 mg/m2 = 880 mg in sodium chloride 0.9% 250 mL chemo infusion, 600 mg/m2, Intravenous, Clinic/HOD 1 time, 0 of 4 cycles  DOCEtaxeL (TAXOTERE) 75 mg/m2 = 110 mg in sodium chloride 0.9% 250 mL chemo infusion, 75 mg/m2, Intravenous, Clinic/HOD 1 time, 0 of 4 cycles           History:  Past Medical History:   Diagnosis Date    Allergy     Amblyopia     Anemia     Anticoagulant long-term use     Arthritis 02/02/1992    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, estrogen receptor positive 4/13/2022    Cataract     Depression     Dry eyes     Dry mouth     Duane's syndrome of right eye     Early dry stage nonexudative age-related macular degeneration of both eyes 9/20/2022    Fibromyalgia 4/17/2014    Fibromyalgia     Fractured hip     RIGHT HIP    GERD (gastroesophageal reflux disease)     History of psychiatric  hospitalization     Hyperlipidemia 1992    Hypertension     Hypocalcemia     Hyponatremia     Kidney stone     Migraine headache     Osteoporosis     Psoriatic arthritis 1992    Right knee pain     post knee replacement surgery (possible rejectiion of metal)    RLS (restless legs syndrome)     Schizophrenia 1992    stable on meds    Squamous cell carcinoma of skin     Urinary tract infection       Past Surgical History:   Procedure Laterality Date    brow ptosis repair Right 2019    Surgeon: Dr. Karla Henson    CATARACT EXTRACTION       SECTION      COLONOSCOPY N/A 2016    Procedure: COLONOSCOPY;  Surgeon: ANDRIA Connelly MD;  Location: [facility] ENDO [address]);  Service: Endoscopy;  Laterality: N/A;    cyst removed from right sinus  1982    HYSTERECTOMY      VAGINAL HYSTERECTOMY WITHOUT BSO - ENDOMETRIOSIS    INJECTION FOR SENTINEL NODE IDENTIFICATION Right 2022    Procedure: INJECTION, FOR SENTINEL NODE IDENTIFICATION-Right;  Surgeon: NEAL Dhillon MD;  Location[place] [facility] [place];  Service: General;  Laterality: Right;    INJECTION OF ANESTHETIC AGENT AROUND MEDIAL BRANCH NERVES INNERVATING LUMBAR FACET JOINT N/A 2019    Procedure: Lumbo-sacral Block, DR5 and Lateral Branches of S1,S2, S3;  Surgeon: Michelle Pineda Jr., MD;  Location: [facility] PAIN MGT;  Service: Pain Management;  Laterality: N/A;  Pt takes  and states she holds ASA on her own whenever she has procedures.  Instructed to hold x 3 days prior to procedure.      INJECTION OF ANESTHETIC AGENT INTO SACROILIAC JOINT Right 2018    Procedure: BLOCK, SACROILIAC JOINT-Right- ORAL SEDATION;  Surgeon: Michelle Pineda Jr., MD;  Location: [facility] PAIN MGT;  Service: Pain Management;  Laterality: Right;    INJECTION OF ANESTHETIC AGENT INTO SACROILIAC JOINT Bilateral 2018    Procedure: BLOCK, SACROILIAC JOINT-BILATERAL;  Surgeon: Michelle Pineda Jr., MD;  Location: [facility] PAIN MGT;  Service: Pain Management;   Laterality: Bilateral;  Please keep at 10:00 due to trasnsportation    INJECTION OF ANESTHETIC AGENT INTO SACROILIAC JOINT Bilateral 2/7/2019    Procedure: Bilateral Sacroiliac Joint Injection - Per Dr Pineda, not necessary to hold ASA.;  Surgeon: Michelle Pineda Jr., MD;  Location: [facility] PAIN MGT;  Service: Pain Management;  Laterality: Bilateral;    INTRAOCULAR PROSTHESES INSERTION Right 8/1/2019    Procedure: INSERTION, IOL PROSTHESIS;  Surgeon: Karen Song MD;  Location: [facility] OR [address] [place] [address];  Service: Ophthalmology;  Laterality: Right;    INTRAOCULAR PROSTHESES INSERTION Left 9/26/2019    Procedure: INSERTION, IOL PROSTHESIS;  Surgeon: Karen Song MD;  Location: [facility] OR [address] [place] [address];  Service: Ophthalmology;  Laterality: Left;    JOINT REPLACEMENT Right     knee    KNEE SURGERY      MASTECTOMY Right 5/9/2022    Procedure: MASTECTOMY-Right;  Surgeon: NEAL Dhillon MD;  Location: [facility];  Service: General;  Laterality: Right;  2.5 HOURS    PHACOEMULSIFICATION OF CATARACT Right 8/1/2019    Procedure: PHACOEMULSIFICATION, CATARACT;  Surgeon: Karen Song MD;  Location: [facility] OR [address] [place] [address];  Service: Ophthalmology;  Laterality: Right;    PHACOEMULSIFICATION OF CATARACT Left 9/26/2019    Procedure: PHACOEMULSIFICATION, CATARACT;  Surgeon: Karen Song MD;  Location: [facility] OR [address] [place] [address];  Service: Ophthalmology;  Laterality: Left;    RADIOFREQUENCY THERMOCOAGULATION Right 5/7/2019    Procedure: RADIOFREQUENCY THERMAL COAGULATION RIGHT DORSAL RAMUS 5 AND LATERAL BRANCH OF S1, S2 AND S3;  Surgeon: Michelle Pineda Jr., MD;  Location: [facility] PAIN MGT;  Service: Pain Management;  Laterality: Right;    RADIOFREQUENCY THERMOCOAGULATION Left 5/14/2019    Procedure: RADIOFREQUENCY THERMAL COAGULATION LEFT DORSAL RAMUS 5 AND LATERAL BRANCH OF S1,S2 AND S3;  Surgeon: Michelle Pineda Jr., MD;  Location: [facility] PAIN MGT;  Service: Pain Management;  Laterality: Left;    RADIOFREQUENCY THERMOCOAGULATION Right 10/22/2019     Procedure: RADIOFREQUENCY THERMAL COAGULATION---DARSAL RAMUS 5 and LATERAL S1,S2,and S3 Right;  Surgeon: Michelle Pineda Jr., MD;  Location: [facility];  Service: Pain Management;  Laterality: Right;  patient to sign consent DOS    RADIOFREQUENCY THERMOCOAGULATION Left 10/29/2019    Procedure: RADIOFREQUENCY THERMAL COAGULATION - LEFT - DR5, S1,S2, AND S3;  Surgeon: Michelle Pineda Jr., MD;  Location: [facility];  Service: Pain Management;  Laterality: Left;    RADIOFREQUENCY THERMOCOAGULATION Left 5/26/2020    Procedure: RADIOFREQUENCY THERMAL COAGULATION--Left DR5+ lateral branches of S1, S2, S3;  Surgeon: Michelle Pineda Jr., MD;  Location: [facility];  Service: Pain Management;  Laterality: Left;    RADIOFREQUENCY THERMOCOAGULATION Right 6/2/2020    Procedure: RADIOFREQUENCY THERMAL COAGULATION--Right DR5+ lateral branches of S1, S2, S3;  Surgeon: Michelle Pineda Jr., MD;  Location: [facility];  Service: Pain Management;  Laterality: Right;    SENTINEL LYMPH NODE BIOPSY Right 5/9/2022    Procedure: BIOPSY, LYMPH NODE, SENTINEL-Right;  Surgeon: NEAL Dhillon MD;  Location: [facility];  Service: General;  Laterality: Right;    Surgery on right knee  07/09/1982    tumor removed from back left side upper shoulder  03/17/2006     Family History   Problem Relation Age of Onset    Colon cancer Mother     Psoriasis Mother     Cancer Mother         colon    Depression Mother     Cancer Father         lymphoma    Stroke Sister     Diabetes Brother     Arthritis Brother     Heart disease Brother         cad    No Known Problems Daughter     Hypertension Neg Hx     Suicide Neg Hx     Amblyopia Neg Hx     Blindness Neg Hx     Cataracts Neg Hx     Glaucoma Neg Hx     Macular degeneration Neg Hx     Retinal detachment Neg Hx     Strabismus Neg Hx       Social History     Tobacco Use    Smoking status: Every Day     Packs/day: 0.25     Years: 10.00     Pack years: 2.50     Types: Cigarettes    Smokeless  "tobacco: Former    Tobacco comments:     about 5 cig a day   Substance and Sexual Activity    Alcohol use: No    Drug use: No    Sexual activity: Not Currently     Partners: Male     Birth control/protection: Post-menopausal        ROS:   Review of Systems   Constitutional:  Negative for fever, malaise/fatigue and weight loss.   HENT:  Negative for congestion, ear pain, nosebleeds and sore throat.    Eyes:  Negative for blurred vision, double vision and photophobia.   Respiratory:  Negative for cough, hemoptysis, sputum production, shortness of breath, wheezing and stridor.    Cardiovascular:  Negative for chest pain, palpitations, orthopnea and leg swelling.   Gastrointestinal:  Negative for abdominal pain, blood in stool, constipation, diarrhea, heartburn, melena, nausea and vomiting.   Genitourinary:  Negative for dysuria and hematuria.   Musculoskeletal:  Positive for back pain and joint pain (left hip with recent exacerbation). Negative for myalgias and neck pain.   Skin:  Negative for itching and rash.   Neurological:  Negative for dizziness, focal weakness, seizures, weakness and headaches.   Endo/Heme/Allergies:  Negative for polydipsia. Does not bruise/bleed easily.   Psychiatric/Behavioral:  Positive for depression. Negative for memory loss. The patient is nervous/anxious. The patient does not have insomnia.       Objective:     Vitals:    11/08/22 0922   BP: (!) 152/67   Pulse: 77   Resp: 16   Temp: 98.1 °F (36.7 °C)   SpO2: 98%   Weight: 49.1 kg (108 lb 3.9 oz)   Height: 5' 2" (1.575 m)   PainSc:   8         Physical Examination:   Physical Exam  Vitals and nursing note reviewed.   Constitutional:       General: She is not in acute distress.     Appearance: She is not diaphoretic.   HENT:      Head: Normocephalic.      Mouth/Throat:      Pharynx: No oropharyngeal exudate.   Eyes:      General: No scleral icterus.     Conjunctiva/sclera: Conjunctivae normal.   Neck:      Thyroid: No thyromegaly. "   Cardiovascular:      Rate and Rhythm: Normal rate and regular rhythm.      Heart sounds: Normal heart sounds. No murmur heard.  Pulmonary:      Effort: Pulmonary effort is normal. No respiratory distress.      Breath sounds: No stridor. No wheezing or rhonchi.   Chest:      Chest wall: No tenderness.   Abdominal:      General: Bowel sounds are normal. There is no distension.      Palpations: Abdomen is soft. There is no mass.      Tenderness: There is no abdominal tenderness. There is no rebound.   Musculoskeletal:         General: Tenderness (left hip) present. No deformity. Normal range of motion.      Cervical back: Neck supple.   Lymphadenopathy:      Cervical: No cervical adenopathy.   Skin:     General: Skin is warm and dry.      Findings: No erythema or rash.   Neurological:      Mental Status: She is alert and oriented to person, place, and time.      Cranial Nerves: No cranial nerve deficit.      Coordination: Coordination normal.      Gait: Gait is intact.   Psychiatric:         Mood and Affect: Affect normal.         Cognition and Memory: Memory normal.         Judgment: Judgment normal.        Diagnostic Tests:  Significant Imaging: I have reviewed and interpreted all pertinent imaging results/findings.    Laboratory Data:  All pertinent labs have been reviewed.    Labs:   Lab Results   Component Value Date    WBC 8.95 11/07/2022    HGB 12.3 11/07/2022    HCT 37.9 11/07/2022    MCV 89 11/07/2022     11/07/2022       Assessment/Plan:   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, estrogen receptor positive  pT1b N0 (i+)  grade 2 ER + VA - HER2 - IDC of the right breast.    She is status post mastectomy and sentinel node exam for IDC as well as DCIS of the right breast.     Oncotype returned high risk, which confers a benefit ot the addition of chemotherapy to AI.   RSclin showed an 8% risk of recurrence with 4% absolute benefit of chemotherapy. She has declined chemotherapy.      Tolerating anastrozole relatively well. Continue at this time. See below.   Continue Zometa q 6 months for bone health and possible risk reduction for breast cancer.     Continue active surveillance. Will see her back in 6 weeks for her scheduled visit. Left mammogram is due in March.     Osteopenia of multiple sites  Diagnosed with osteopenia/osteoporosis given hip fracture in 2020. Follows up with Dr. Cornelius and is on zoledronic acid, most recent does in March 17, 2022.   Follow up bone density scan is normal. Will continue zoledronic acid, cleared to receive today.     Hip pain   Recent exacerbation of left hip pain. Given only localized symptoms, unlikely to be secondary to AI, will repeat imaging and refer to orthopedics as clinically indicated. Will also refer back to Dr. Cornelius once imaging has resulted.     Aromatase inhibitor arthralgias   Continue supportive care. Follow hip imaging. She declined acupuncture referral at this time.     Cough  Sinusitis   X ray was normal, symptoms are improving. She will follow up with ENT and her PMD.     MDM includes  :    - Acute or chronic illness or injury that poses a threat to life or bodily function  - Independent review and explanation of 3+ results from unique tests  - Discussion of management and ordering 3+ unique tests  - Extensive discussion of treatment and management  - Prescription drug management  - Drug therapy requiring intensive monitoring for toxicity        ECOG SCORE    1 - Restricted in strenuous activity-ambulatory and able to carry out work of a light nature           Discussion:   No follow-ups on file.    Plan was discussed with the patient at length, and she verbalized understanding. Bhavna was given an opportunity to ask questions that were answered to her satisfaction, and she was advised to call in the interval if any problems or questions arise.    Electronically signed by Millie Marcelino MD      Route Chart for Scheduling    Med Onc  Chart Routing      Follow up with physician . 12/20   Follow up with LATOYA    Infusion scheduling note    Injection scheduling note    Labs    Imaging MRI   x rays today. MRI at [facility] first available   Pharmacy appointment    Other referrals          Therapy Plan Information  Medications  zoledronic acid (ZOMETA) 4 mg in sodium chloride 0.9% 100 mL IVPB  4 mg, Intravenous, Every 24 weeks  Flushes  sodium chloride 0.9% 250 mL flush bag  Intravenous, Every 24 weeks  sodium chloride 0.9% flush 10 mL  10 mL, Intravenous, Every 24 weeks  heparin, porcine (PF) 100 unit/mL injection flush 500 Units  500 Units, Intravenous, Every 24 weeks  alteplase injection 2 mg  2 mg, Intra-Catheter, Every 24 weeks

## 2022-11-08 NOTE — PLAN OF CARE
Pt admitted for #1 treatment of IV Zometa, discussed need for drug. Pt tolerated well. PIV removed and Pt discharged home @ 15:05

## 2022-11-09 ENCOUNTER — TELEPHONE (OUTPATIENT)
Dept: HEMATOLOGY/ONCOLOGY | Facility: CLINIC | Age: 67
End: 2022-11-09
Payer: MEDICARE

## 2022-11-09 NOTE — TELEPHONE ENCOUNTER
Please let her know that there is no fracture and she needs to do the MRI (scheduled) and see Ortho. I put in a referral, pls help co ordinate an appt. Thank you

## 2022-11-09 NOTE — TELEPHONE ENCOUNTER
"Returned call no answer, left vm          ----- Message from Tex Yost sent at 11/9/2022  9:42 AM CST -----  Consult/Advisory:          Name Of Caller: Self      Contact Preference?: [phone]      Provider Name: Chari      Does patient feel the need to be seen today? No      What is the nature of the call?: Calling to speak w/ nurse about X-Ray results          Additional Notes:  "Thank you for all that you do for our patients"      "

## 2022-11-10 NOTE — TELEPHONE ENCOUNTER
Called and spoke to pt. Gave xray results, MD recommendations and phone number to [facility] to pt to call and scheduled appt. Pt agrees to date time and location of upcoming MRI>

## 2022-11-14 ENCOUNTER — TELEPHONE (OUTPATIENT)
Dept: OTOLARYNGOLOGY | Facility: CLINIC | Age: 67
End: 2022-11-14
Payer: MEDICARE

## 2022-11-14 LAB — BACTERIA SPEC ANAEROBE CULT: NORMAL

## 2022-11-14 NOTE — TELEPHONE ENCOUNTER
Patient was advised that the culture results were still pending. She will be notified as soon as the results are ready. Patient verbalized understanding.

## 2022-11-14 NOTE — TELEPHONE ENCOUNTER
----- Message from Nathaniel Goode MD sent at 11/14/2022  2:23 PM CST -----  Regarding: RE: Results  Contact: pt [phone]  The final test result is still not available (pending).  Please let her know.    ----- Message -----  From: Charlotte Hood LPN  Sent: 11/14/2022   1:17 PM CST  To: Nathaniel Goode MD  Subject: FW: Results                                        ----- Message -----  From: Heather Galvin  Sent: 11/14/2022   1:10 PM CST  To: [facility] Staff  Subject: Results                                          Pt is calling in ref to recent test results, the results were negative and patient would like to know what she she take for the ongoing problem.

## 2022-11-17 ENCOUNTER — TELEPHONE (OUTPATIENT)
Dept: OTOLARYNGOLOGY | Facility: CLINIC | Age: 67
End: 2022-11-17
Payer: MEDICARE

## 2022-11-17 DIAGNOSIS — J32.0 CHRONIC MAXILLARY SINUSITIS: Primary | ICD-10-CM

## 2022-11-17 LAB — BACTERIA SPEC AEROBE CULT: ABNORMAL

## 2022-11-17 RX ORDER — FLUCONAZOLE 150 MG/1
150 TABLET ORAL DAILY
Qty: 7 TABLET | Refills: 0 | Status: SHIPPED | OUTPATIENT
Start: 2022-11-17 | End: 2022-11-23 | Stop reason: SDUPTHER

## 2022-11-17 NOTE — TELEPHONE ENCOUNTER
I spoke with her.  Symptoms unchanged, has right-sided headache, feeling of swelling, thick mucus discharge and postnasal drip that is white or clear.  Culture finally resulted 10 days later, shows curvularia.  Will rx fluconazole orally x7 days plus voriconazole in sinus rinse from [address].  F/U in 2 weeks for endoscopy.  She agrees, questions answered.

## 2022-11-17 NOTE — TELEPHONE ENCOUNTER
----- Message from Duglas Pan MA sent at 11/17/2022  1:38 PM CST -----  Regarding: FW: Results  Contact: PT @ [phone]  Spoke with her, mentioned you will send her a message through patient portal with her result and what medication is needed.  ----- Message -----  From: Nesha Gee  Sent: 11/17/2022   1:28 PM CST  To: [facility] Staff  Subject: Results                                          Pt is calling to speak to someone in the office to discuss her test results. Pt states that she received her results thru the portal and she has a sinus fungal infection. Pt is very concerned and is wanting a call back to day, she states that this can spread throughout her body and she would like to know what is the next steps. Please call. Thanks.

## 2022-11-17 NOTE — TELEPHONE ENCOUNTER
----- Message from Nesha Gee sent at 11/17/2022  1:24 PM CST -----  Regarding: Results  Contact: PT @ [phone]  Pt is calling to speak to someone in the office to discuss her test results. Pt states that she received her results thru the portal and she has a sinus fungal infection. Pt is very concerned and is wanting a call back to day, she states that this can spread throughout her body and she would like to know what is the next steps. Please call. Thanks.

## 2022-11-23 ENCOUNTER — TELEPHONE (OUTPATIENT)
Dept: OTOLARYNGOLOGY | Facility: CLINIC | Age: 67
End: 2022-11-23
Payer: MEDICARE

## 2022-11-23 ENCOUNTER — HOSPITAL ENCOUNTER (OUTPATIENT)
Dept: RADIOLOGY | Facility: HOSPITAL | Age: 67
Discharge: HOME | End: 2022-11-23
Attending: INTERNAL MEDICINE
Payer: MEDICARE

## 2022-11-23 DIAGNOSIS — J32.0 CHRONIC MAXILLARY SINUSITIS: ICD-10-CM

## 2022-11-23 DIAGNOSIS — M25.559 PAIN IN UNSPECIFIED HIP: ICD-10-CM

## 2022-11-23 PROCEDURE — 73721 MRI HIP WITHOUT CONTRAST LEFT: ICD-10-PCS | Mod: 26,LT,, | Performed by: RADIOLOGY

## 2022-11-23 PROCEDURE — 73721 MRI JNT OF LWR EXTRE W/O DYE: CPT | Mod: 26,LT,, | Performed by: RADIOLOGY

## 2022-11-23 PROCEDURE — 73721 MRI JNT OF LWR EXTRE W/O DYE: CPT | Mod: TC,LT

## 2022-11-23 RX ORDER — FLUCONAZOLE 150 MG/1
150 TABLET ORAL DAILY
Qty: 7 TABLET | Refills: 0 | Status: SHIPPED | OUTPATIENT
Start: 2022-11-23 | End: 2022-11-30

## 2022-11-23 NOTE — TELEPHONE ENCOUNTER
I spoke to her.  Better but not turned the corner yet.  Will refill additional 7 days of diflucan.  Continue voriconazole sinus rinse for 1 month.

## 2022-11-23 NOTE — TELEPHONE ENCOUNTER
----- Message from Duglas Pan MA sent at 11/23/2022 10:25 AM CST -----  Contact: [phone]  Please Advise  ----- Message -----  From: Basilio Mao  Sent: 11/23/2022  10:22 AM CST  To: [facility] Staff    Pt is calling to speak with Dr George in ref to her meds -- states she'll be out her meds and she wants to know if shes suppose to get a refill or not -- please advise [phone]

## 2022-11-28 DIAGNOSIS — C50.411 CARCINOMA OF UPPER-OUTER QUADRANT OF RIGHT BREAST IN FEMALE, ESTROGEN RECEPTOR POSITIVE: ICD-10-CM

## 2022-11-28 DIAGNOSIS — Z17.0 CARCINOMA OF UPPER-OUTER QUADRANT OF RIGHT BREAST IN FEMALE, ESTROGEN RECEPTOR POSITIVE: ICD-10-CM

## 2022-11-29 ENCOUNTER — PATIENT MESSAGE (OUTPATIENT)
Dept: HEMATOLOGY/ONCOLOGY | Facility: CLINIC | Age: 67
End: 2022-11-29
Payer: MEDICARE

## 2022-11-29 ENCOUNTER — OFFICE VISIT (OUTPATIENT)
Dept: SURGERY | Facility: CLINIC | Age: 67
End: 2022-11-29
Payer: MEDICARE

## 2022-11-29 VITALS
HEIGHT: 62 IN | BODY MASS INDEX: 18.95 KG/M2 | WEIGHT: 103 LBS | SYSTOLIC BLOOD PRESSURE: 156 MMHG | DIASTOLIC BLOOD PRESSURE: 73 MMHG | HEART RATE: 70 BPM

## 2022-11-29 DIAGNOSIS — Z85.3 PERSONAL HISTORY OF BREAST CANCER: ICD-10-CM

## 2022-11-29 DIAGNOSIS — C50.411 CARCINOMA OF UPPER-OUTER QUADRANT OF RIGHT BREAST IN FEMALE, ESTROGEN RECEPTOR POSITIVE: ICD-10-CM

## 2022-11-29 DIAGNOSIS — Z17.0 CARCINOMA OF UPPER-OUTER QUADRANT OF RIGHT BREAST IN FEMALE, ESTROGEN RECEPTOR POSITIVE: ICD-10-CM

## 2022-11-29 DIAGNOSIS — Z90.11 S/P MASTECTOMY, RIGHT: Primary | ICD-10-CM

## 2022-11-29 PROCEDURE — 1101F PT FALLS ASSESS-DOCD LE1/YR: CPT | Mod: CPTII,S$GLB,, | Performed by: PHYSICIAN ASSISTANT

## 2022-11-29 PROCEDURE — 1126F PR PAIN SEVERITY QUANTIFIED, NO PAIN PRESENT: ICD-10-PCS | Mod: CPTII,S$GLB,, | Performed by: PHYSICIAN ASSISTANT

## 2022-11-29 PROCEDURE — 99999 PR PBB SHADOW E&M-EST. PATIENT-LVL IV: ICD-10-PCS | Mod: PBBFAC,,, | Performed by: PHYSICIAN ASSISTANT

## 2022-11-29 PROCEDURE — 3008F PR BODY MASS INDEX (BMI) DOCUMENTED: ICD-10-PCS | Mod: CPTII,S$GLB,, | Performed by: PHYSICIAN ASSISTANT

## 2022-11-29 PROCEDURE — 3288F PR FALLS RISK ASSESSMENT DOCUMENTED: ICD-10-PCS | Mod: CPTII,S$GLB,, | Performed by: PHYSICIAN ASSISTANT

## 2022-11-29 PROCEDURE — 3288F FALL RISK ASSESSMENT DOCD: CPT | Mod: CPTII,S$GLB,, | Performed by: PHYSICIAN ASSISTANT

## 2022-11-29 PROCEDURE — 99215 PR OFFICE/OUTPT VISIT, EST, LEVL V, 40-54 MIN: ICD-10-PCS | Mod: S$GLB,,, | Performed by: PHYSICIAN ASSISTANT

## 2022-11-29 PROCEDURE — 3078F PR MOST RECENT DIASTOLIC BLOOD PRESSURE < 80 MM HG: ICD-10-PCS | Mod: CPTII,S$GLB,, | Performed by: PHYSICIAN ASSISTANT

## 2022-11-29 PROCEDURE — 1159F MED LIST DOCD IN RCRD: CPT | Mod: CPTII,S$GLB,, | Performed by: PHYSICIAN ASSISTANT

## 2022-11-29 PROCEDURE — 99215 OFFICE O/P EST HI 40 MIN: CPT | Mod: S$GLB,,, | Performed by: PHYSICIAN ASSISTANT

## 2022-11-29 PROCEDURE — 3078F DIAST BP <80 MM HG: CPT | Mod: CPTII,S$GLB,, | Performed by: PHYSICIAN ASSISTANT

## 2022-11-29 PROCEDURE — 1126F AMNT PAIN NOTED NONE PRSNT: CPT | Mod: CPTII,S$GLB,, | Performed by: PHYSICIAN ASSISTANT

## 2022-11-29 PROCEDURE — 3077F SYST BP >= 140 MM HG: CPT | Mod: CPTII,S$GLB,, | Performed by: PHYSICIAN ASSISTANT

## 2022-11-29 PROCEDURE — 3008F BODY MASS INDEX DOCD: CPT | Mod: CPTII,S$GLB,, | Performed by: PHYSICIAN ASSISTANT

## 2022-11-29 PROCEDURE — 4010F PR ACE/ARB THEARPY RXD/TAKEN: ICD-10-PCS | Mod: CPTII,S$GLB,, | Performed by: PHYSICIAN ASSISTANT

## 2022-11-29 PROCEDURE — 3077F PR MOST RECENT SYSTOLIC BLOOD PRESSURE >= 140 MM HG: ICD-10-PCS | Mod: CPTII,S$GLB,, | Performed by: PHYSICIAN ASSISTANT

## 2022-11-29 PROCEDURE — 1159F PR MEDICATION LIST DOCUMENTED IN MEDICAL RECORD: ICD-10-PCS | Mod: CPTII,S$GLB,, | Performed by: PHYSICIAN ASSISTANT

## 2022-11-29 PROCEDURE — 1101F PR PT FALLS ASSESS DOC 0-1 FALLS W/OUT INJ PAST YR: ICD-10-PCS | Mod: CPTII,S$GLB,, | Performed by: PHYSICIAN ASSISTANT

## 2022-11-29 PROCEDURE — 99999 PR PBB SHADOW E&M-EST. PATIENT-LVL IV: CPT | Mod: PBBFAC,,, | Performed by: PHYSICIAN ASSISTANT

## 2022-11-29 PROCEDURE — 4010F ACE/ARB THERAPY RXD/TAKEN: CPT | Mod: CPTII,S$GLB,, | Performed by: PHYSICIAN ASSISTANT

## 2022-11-29 RX ORDER — ANASTROZOLE 1 MG/1
1 TABLET ORAL DAILY
Qty: 90 TABLET | Refills: 3 | Status: SHIPPED | OUTPATIENT
Start: 2022-11-29 | End: 2023-03-22

## 2022-11-29 NOTE — PROGRESS NOTES
[facility]  Department of Surgery    REFERRING PROVIDER: No referring provider defined for this encounter. Sadaf Vargas MD  CHIEF COMPLAINT:   Chief Complaint   Patient presents with    Follow-up     6 mo f/u       DIAGNOSIS:   This is a 67 y.o. female with a history of stage T1bN0 ( i +), grade 2, ER +, ME -, HER2 - IDC of the right breast.    TREATMENT:   1. S/p right mastectomy with sentinel node biopsy on 5/9/2022. Dr. Alejo M.D. Surgical Oncology  2. Final Path:  8 mm focus of IDC, grade 2. DCIS also present. 1 node with isolated tumor cells.   3. On Letrozole with Dr. Chari M.D. Medical Oncology     HISTORY OF PRESENT ILLNESS:   Bhavna Landry is a 67 y.o. female comes in for oncological follow up.  She denies change in her breast self-exam specifically denying new masses, skin or nipple changes, or nipple discharge. Past medical and surgical history is updated without new changes. There have been no changes to family history. The patient denies constitutional symptoms of night sweats, chills, weight loss, new headaches, visual changes, new back or bony pain, chest pain, or shortness of breath.        Review of Systems: See HPI/Interval History for other systems reviewed.     IMAGING:     None today       MEDICATIONS/ALLERGIES:     Current Outpatient Medications   Medication Sig    albuterol (PROAIR HFA) 90 mcg/actuation inhaler 2 puffs qid prn    amLODIPine (NORVASC) 5 MG tablet Take 1 tablet (5 mg total) by mouth every evening.    amoxicillin-clavulanate 875-125mg (AUGMENTIN) 875-125 mg per tablet Take 1 tablet by mouth 2 (two) times daily.    anastrozole (ARIMIDEX) 1 mg Tab Take 1 tablet (1 mg total) by mouth once daily.    ascorbic acid, vitamin C, (VITAMIN C) 500 MG tablet Take 500 mg by mouth once daily.    aspirin (ECOTRIN) 81 MG EC tablet Take 81 mg by mouth once daily.    atorvastatin (LIPITOR) 80 MG tablet TAKE 1 TABLET(80 MG) BY MOUTH EVERY DAY    benztropine (COGENTIN) 0.5 MG tablet  Take 1 tablet (0.5 mg total) by mouth 2 (two) times daily.    cyanocobalamin 500 MCG tablet Take 500 mcg by mouth once daily.    fluconazole (DIFLUCAN) 150 MG Tab Take 1 tablet (150 mg total) by mouth once daily. Take 1 pill on first day.  If symptoms persist for 3 days, take the second pill. for 7 days    fluticasone propionate (FLONASE) 50 mcg/actuation nasal spray 2 sprays (100 mcg total) by Each Nostril route once daily.    folic acid (FOLVITE) 1 MG tablet Take 1 tablet (1 mg total) by mouth once daily.    isosorbide mononitrate (IMDUR) 30 MG 24 hr tablet TAKE 1 TABLET(30 MG) BY MOUTH EVERY DAY    lisinopriL (PRINIVIL,ZESTRIL) 5 MG tablet Take 1 tablet (5 mg total) by mouth once daily.    LUTEIN ORAL Take by mouth.    methotrexate 2.5 MG Tab Take 4 tablets (10 mg total) by mouth every 7 days.    multivitamin (THERAGRAN) per tablet Take 1 tablet by mouth once daily.    omega-3 fatty acids/fish oil (FISH OIL-OMEGA-3 FATTY ACIDS) 300-1,000 mg capsule Take by mouth once daily.    risperiDONE (RISPERDAL) 2 MG tablet TAKE 1 TABLET(2 MG) BY MOUTH EVERY MORNING    risperiDONE (RISPERDAL) 4 MG tablet TAKE 1 TABLET(4 MG) BY MOUTH EVERY EVENING    tofacitinib citrate (XELJANZ ORAL) Take by mouth.    venlafaxine (EFFEXOR-XR) 75 MG 24 hr capsule TAKE 1 CAPSULE(75 MG) BY MOUTH EVERY DAY    VITAMIN A ORAL Take by mouth.    vitamin D (VITAMIN D3) 1000 units Tab Take 1,000 Units by mouth once daily.    vitamin E 200 UNIT capsule Take 200 Units by mouth once daily.    zinc gluconate 50 mg tablet Take 50 mg by mouth once daily.    gabapentin (NEURONTIN) 300 MG capsule Take 1 capsule (300 mg total) by mouth every evening.     No current facility-administered medications for this visit.     Facility-Administered Medications Ordered in Other Visits   Medication    tropicamide 1% ophthalmic solution 1 drop      Review of patient's allergies indicates:   Allergen Reactions    Etanercept Other (See Comments)     Other reaction(s):  "recurrent infections    Chloramphenicol sod succinate Hives    Codeine Other (See Comments)     Other reaction(s): Stomach upset. Pt states OK with Percocet    Nickel sutures [surgical stainless steel] Dermatitis     Allergic contact dermatitis    Adhesive Rash       PHYSICAL EXAM:   BP (!) 156/73 (BP Location: Left arm, Patient Position: Sitting, BP Method: Small (Automatic))   Pulse 70   Ht 5' 2" (1.575 m)   Wt 46.7 kg (103 lb)   BMI 18.84 kg/m²     Physical Exam   Vitals reviewed.  Constitutional: She is oriented to person, place, and time.   HENT:   Head: Normocephalic and atraumatic.   Nose: Nose normal.   Eyes: Pupils are equal, round, and reactive to light. Right eye exhibits no discharge. Left eye exhibits no discharge.   Pulmonary/Chest: Effort normal and breath sounds normal. No stridor. No respiratory distress. She exhibits no mass, no tenderness and no edema. Right breast exhibits no mass, no skin change and no tenderness. Left breast exhibits no inverted nipple, no mass, no nipple discharge, no skin change and no tenderness. No breast swelling or bleeding. Breasts are symmetrical.       Abdominal: Normal appearance.   Genitourinary: No breast swelling or bleeding.   Neurological: She is alert and oriented to person, place, and time.   Skin: Skin is warm and dry.     Psychiatric: Her behavior is normal. Mood, judgment and thought content normal.     ASSESSMENT:   This is a 67 y.o. female without evidence of recurrence by exam, history or imaging.       PLAN:   1. Continue to follow up with Dr. Marcelino in Medical Oncology.    2. Continue monthly self breast exams and call the clinic with any changes or problems.  3. Left mammogram due in March 2023.   4. Return to clinic in March with mammogram.     The patient is in agreement with the plan. Questions were encouraged and answered to patient's satisfaction. Bhavna will call our office with any questions or concerns.       Total time spent with the " patient: 40.  30 minutes of face to face consultation and 10 minutes of chart review and coordination of care.    Kalyani Carson PA-C  Breast Surgery

## 2022-11-30 ENCOUNTER — OFFICE VISIT (OUTPATIENT)
Dept: ORTHOPEDICS | Facility: CLINIC | Age: 67
End: 2022-11-30
Payer: MEDICARE

## 2022-11-30 ENCOUNTER — TELEPHONE (OUTPATIENT)
Dept: RHEUMATOLOGY | Facility: CLINIC | Age: 67
End: 2022-11-30
Payer: MEDICARE

## 2022-11-30 VITALS
WEIGHT: 104.06 LBS | DIASTOLIC BLOOD PRESSURE: 73 MMHG | SYSTOLIC BLOOD PRESSURE: 146 MMHG | HEIGHT: 62 IN | HEART RATE: 82 BPM | BODY MASS INDEX: 19.15 KG/M2

## 2022-11-30 DIAGNOSIS — M54.50 CHRONIC LEFT-SIDED LOW BACK PAIN WITHOUT SCIATICA: ICD-10-CM

## 2022-11-30 DIAGNOSIS — G89.29 CHRONIC LEFT-SIDED LOW BACK PAIN WITHOUT SCIATICA: ICD-10-CM

## 2022-11-30 DIAGNOSIS — M25.559 PAIN IN UNSPECIFIED HIP: ICD-10-CM

## 2022-11-30 DIAGNOSIS — M53.3 SACROILIAC JOINT DYSFUNCTION: Primary | ICD-10-CM

## 2022-11-30 PROCEDURE — 1125F AMNT PAIN NOTED PAIN PRSNT: CPT | Mod: CPTII,S$GLB,, | Performed by: PHYSICIAN ASSISTANT

## 2022-11-30 PROCEDURE — 99999 PR PBB SHADOW E&M-EST. PATIENT-LVL V: CPT | Mod: PBBFAC,,, | Performed by: PHYSICIAN ASSISTANT

## 2022-11-30 PROCEDURE — 3288F FALL RISK ASSESSMENT DOCD: CPT | Mod: CPTII,S$GLB,, | Performed by: PHYSICIAN ASSISTANT

## 2022-11-30 PROCEDURE — 4010F PR ACE/ARB THEARPY RXD/TAKEN: ICD-10-PCS | Mod: CPTII,S$GLB,, | Performed by: PHYSICIAN ASSISTANT

## 2022-11-30 PROCEDURE — 1101F PT FALLS ASSESS-DOCD LE1/YR: CPT | Mod: CPTII,S$GLB,, | Performed by: PHYSICIAN ASSISTANT

## 2022-11-30 PROCEDURE — 99213 OFFICE O/P EST LOW 20 MIN: CPT | Mod: S$GLB,,, | Performed by: PHYSICIAN ASSISTANT

## 2022-11-30 PROCEDURE — 1101F PR PT FALLS ASSESS DOC 0-1 FALLS W/OUT INJ PAST YR: ICD-10-PCS | Mod: CPTII,S$GLB,, | Performed by: PHYSICIAN ASSISTANT

## 2022-11-30 PROCEDURE — 3077F PR MOST RECENT SYSTOLIC BLOOD PRESSURE >= 140 MM HG: ICD-10-PCS | Mod: CPTII,S$GLB,, | Performed by: PHYSICIAN ASSISTANT

## 2022-11-30 PROCEDURE — 1160F RVW MEDS BY RX/DR IN RCRD: CPT | Mod: CPTII,S$GLB,, | Performed by: PHYSICIAN ASSISTANT

## 2022-11-30 PROCEDURE — 99213 PR OFFICE/OUTPT VISIT, EST, LEVL III, 20-29 MIN: ICD-10-PCS | Mod: S$GLB,,, | Performed by: PHYSICIAN ASSISTANT

## 2022-11-30 PROCEDURE — 3288F PR FALLS RISK ASSESSMENT DOCUMENTED: ICD-10-PCS | Mod: CPTII,S$GLB,, | Performed by: PHYSICIAN ASSISTANT

## 2022-11-30 PROCEDURE — 1159F PR MEDICATION LIST DOCUMENTED IN MEDICAL RECORD: ICD-10-PCS | Mod: CPTII,S$GLB,, | Performed by: PHYSICIAN ASSISTANT

## 2022-11-30 PROCEDURE — 1160F PR REVIEW ALL MEDS BY PRESCRIBER/CLIN PHARMACIST DOCUMENTED: ICD-10-PCS | Mod: CPTII,S$GLB,, | Performed by: PHYSICIAN ASSISTANT

## 2022-11-30 PROCEDURE — 3077F SYST BP >= 140 MM HG: CPT | Mod: CPTII,S$GLB,, | Performed by: PHYSICIAN ASSISTANT

## 2022-11-30 PROCEDURE — 3078F PR MOST RECENT DIASTOLIC BLOOD PRESSURE < 80 MM HG: ICD-10-PCS | Mod: CPTII,S$GLB,, | Performed by: PHYSICIAN ASSISTANT

## 2022-11-30 PROCEDURE — 3008F PR BODY MASS INDEX (BMI) DOCUMENTED: ICD-10-PCS | Mod: CPTII,S$GLB,, | Performed by: PHYSICIAN ASSISTANT

## 2022-11-30 PROCEDURE — 99999 PR PBB SHADOW E&M-EST. PATIENT-LVL V: ICD-10-PCS | Mod: PBBFAC,,, | Performed by: PHYSICIAN ASSISTANT

## 2022-11-30 PROCEDURE — 1159F MED LIST DOCD IN RCRD: CPT | Mod: CPTII,S$GLB,, | Performed by: PHYSICIAN ASSISTANT

## 2022-11-30 PROCEDURE — 4010F ACE/ARB THERAPY RXD/TAKEN: CPT | Mod: CPTII,S$GLB,, | Performed by: PHYSICIAN ASSISTANT

## 2022-11-30 PROCEDURE — 3008F BODY MASS INDEX DOCD: CPT | Mod: CPTII,S$GLB,, | Performed by: PHYSICIAN ASSISTANT

## 2022-11-30 PROCEDURE — 3078F DIAST BP <80 MM HG: CPT | Mod: CPTII,S$GLB,, | Performed by: PHYSICIAN ASSISTANT

## 2022-11-30 PROCEDURE — 1125F PR PAIN SEVERITY QUANTIFIED, PAIN PRESENT: ICD-10-PCS | Mod: CPTII,S$GLB,, | Performed by: PHYSICIAN ASSISTANT

## 2022-11-30 RX ORDER — GABAPENTIN 300 MG/1
300 CAPSULE ORAL 3 TIMES DAILY
COMMUNITY
End: 2023-02-24 | Stop reason: SDUPTHER

## 2022-11-30 NOTE — TELEPHONE ENCOUNTER
----- Message from Jazz Conn PharmD sent at 11/30/2022  2:36 PM CST -----  Contact: @[phone]  I do not have the form and I don't know what [employer] is asking for. Pt stated it was faxed to Dr. Cornelius's office twice. Please have Dr. Cornelius send a new prescription order to [facility] if you need help then someone from our financial assistance team can assist    ----- Message -----  From: Jesscia Genao MA  Sent: 11/30/2022   2:03 PM CST  To: Jazz Conn PharmD    I don't have the form if you have one you can fax please send it to [phone] and I will take care of it tomorrow when I get back to the office  Thank you Ms Schulz dominic rock  ----- Message -----  From: Jazz Conn PharmD  Sent: 11/30/2022   1:53 PM CST  To: Jessica Genao MA    Hello,     We have not sent anything to the office from [facility]. I called pt to see what she needed and she states [facility] sent Dr. Cornelius's office faxes for re-application for [employer] assistance. If the office would like assistance with re-enrollment for patient assistance please have Dr. Cornelius send [facility] a new order and you can fax the form to us at [phone]. Hope this helps!    ----- Message -----  From: Jessica Genao MA  Sent: 11/30/2022  11:27 AM CST  To: Jazz Conn PharmD    Ms Schulz     Do you know if your office sent this to Dr Cornelius or if you knnow anyting about this. I am not at main campus  ----- Message -----  From: Sera Trinh  Sent: 11/30/2022   9:15 AM CST  To: Ashli PANCHAL Staff    Pt is calling in stating that she has called 3 time to get the form to get assistance on her prescription. Pt states that it has been sent twice. Please call to discuss further.

## 2022-11-30 NOTE — PROGRESS NOTES
Subjective:      Patient ID: Bhavna Landry is a 67 y.o. female.    Chief Complaint: Pain of the Spine      68yo female presents with long standing history of low back pain.Scheduled as hip pain but states her pain is in her back and she has degenerative changes in her spine per MRI. She does endorse some left sided groin pain as well as radiation into her anterior thigh with associated tingling. She states she has been to [facility] without relief and chiropractor which helped for only a year. She takes ibuprofen, tylenol and gabapentin. She has been to pain management who has given her injections but have not helped. She has yet to see a spine specialist.       Review of Systems   Constitutional: Negative for chills and fever.   Cardiovascular:  Negative for chest pain.   Respiratory:  Negative for cough.    Hematologic/Lymphatic: Does not bruise/bleed easily.   Skin:  Negative for poor wound healing and rash.   Musculoskeletal:  Positive for arthritis, back pain, joint pain, myalgias and stiffness.   Gastrointestinal:  Negative for abdominal pain.   Genitourinary:  Negative for bladder incontinence.   Neurological:  Negative for dizziness, loss of balance and weakness.   Psychiatric/Behavioral:  Negative for altered mental status.      Review of patient's allergies indicates:   Allergen Reactions    Etanercept Other (See Comments)     Other reaction(s): recurrent infections    Chloramphenicol sod succinate Hives    Codeine Other (See Comments)     Other reaction(s): Stomach upset. Pt states OK with Percocet    Nickel sutures [surgical stainless steel] Dermatitis     Allergic contact dermatitis    Adhesive Rash        Current Outpatient Medications   Medication Sig Dispense Refill    albuterol (PROAIR HFA) 90 mcg/actuation inhaler 2 puffs qid prn 54 g 3    amLODIPine (NORVASC) 5 MG tablet Take 1 tablet (5 mg total) by mouth every evening. 90 tablet 1    amoxicillin-clavulanate 875-125mg (AUGMENTIN) 875-125 mg per tablet  Take 1 tablet by mouth 2 (two) times daily. 20 tablet 0    anastrozole (ARIMIDEX) 1 mg Tab Take 1 tablet (1 mg total) by mouth once daily. 90 tablet 3    ascorbic acid, vitamin C, (VITAMIN C) 500 MG tablet Take 500 mg by mouth once daily.      aspirin (ECOTRIN) 81 MG EC tablet Take 81 mg by mouth once daily.      atorvastatin (LIPITOR) 80 MG tablet TAKE 1 TABLET(80 MG) BY MOUTH EVERY DAY 90 tablet 3    benztropine (COGENTIN) 0.5 MG tablet Take 1 tablet (0.5 mg total) by mouth 2 (two) times daily. 180 tablet 3    cyanocobalamin 500 MCG tablet Take 500 mcg by mouth once daily.      fluconazole (DIFLUCAN) 150 MG Tab Take 1 tablet (150 mg total) by mouth once daily. Take 1 pill on first day.  If symptoms persist for 3 days, take the second pill. for 7 days 7 tablet 0    fluticasone propionate (FLONASE) 50 mcg/actuation nasal spray 2 sprays (100 mcg total) by Each Nostril route once daily. 16 g 2    folic acid (FOLVITE) 1 MG tablet Take 1 tablet (1 mg total) by mouth once daily. 90 tablet 3    gabapentin (NEURONTIN) 300 MG capsule Take 300 mg by mouth 3 (three) times daily.      isosorbide mononitrate (IMDUR) 30 MG 24 hr tablet TAKE 1 TABLET(30 MG) BY MOUTH EVERY DAY 30 tablet 11    lisinopriL (PRINIVIL,ZESTRIL) 5 MG tablet Take 1 tablet (5 mg total) by mouth once daily. 90 tablet 3    LUTEIN ORAL Take by mouth.      methotrexate 2.5 MG Tab Take 4 tablets (10 mg total) by mouth every 7 days. 16 tablet 0    multivitamin (THERAGRAN) per tablet Take 1 tablet by mouth once daily.      omega-3 fatty acids/fish oil (FISH OIL-OMEGA-3 FATTY ACIDS) 300-1,000 mg capsule Take by mouth once daily.      risperiDONE (RISPERDAL) 2 MG tablet TAKE 1 TABLET(2 MG) BY MOUTH EVERY MORNING 90 tablet 0    risperiDONE (RISPERDAL) 4 MG tablet TAKE 1 TABLET(4 MG) BY MOUTH EVERY EVENING 90 tablet 3    tofacitinib citrate (XELJANZ ORAL) Take by mouth.      venlafaxine (EFFEXOR-XR) 75 MG 24 hr capsule TAKE 1 CAPSULE(75 MG) BY MOUTH EVERY DAY 30  "capsule 0    VITAMIN A ORAL Take by mouth.      vitamin D (VITAMIN D3) 1000 units Tab Take 1,000 Units by mouth once daily.      vitamin E 200 UNIT capsule Take 200 Units by mouth once daily.      zinc gluconate 50 mg tablet Take 50 mg by mouth once daily.       No current facility-administered medications for this visit.     Facility-Administered Medications Ordered in Other Visits   Medication Dose Route Frequency Provider Last Rate Last Admin    tropicamide 1% ophthalmic solution 1 drop  1 drop Right Eye On Call Procedure Karen Song MD   1 drop at 08/01/19 0648        The patient's relevant past medical, surgical, and social history was reviewed in Epic.       Objective:      VITAL SIGNS: BP (!) 146/73   Pulse 82   Ht 5' 2" (1.575 m)   Wt 47.2 kg (104 lb 0.9 oz)   BMI 19.03 kg/m²     General    Nursing note and vitals reviewed.  Constitutional: She is oriented to person, place, and time. She appears well-developed and well-nourished.   Neurological: She is alert and oriented to person, place, and time.     General Musculoskeletal Exam   Gait: antalgic     Left Hip Exam     Range of Motion   Extension:  20   Flexion:  120   External rotation:  70   Internal rotation: 25     Tests   Pain w/ forced internal rotation (HELIO): absent  Pain w/ forced external rotation (FADIR): absent  Stinchfield test: negative    Other   Sensation: normal      Back (L-Spine & T-Spine) / Neck (C-Spine) Exam     Tenderness Posterior midline palpation reveals tenderness of the Lower L-Spine.     Back (L-Spine & T-Spine) Range of Motion   Flexion:  abnormal     Back (L-Spine & T-Spine) Tests   Left Side Tests  Straight leg raise: + at 90 deg         Comments:  +lbp with forward flexion and left lateral bend. No back pain with hip movement.  +TTP left SI joint       Muscle Strength   Left Lower Extremity   Hip Abduction: 5/5   Hip Adduction: 5/5   Hip Flexion: 4/5   Ankle Dorsiflexion:  5/5          Assessment:       1. " Sacroiliac joint dysfunction    2. Pain in unspecified hip    3. Chronic left-sided low back pain without sciatica          Plan:         Bhavna was seen today for pain.    Diagnoses and all orders for this visit:    Sacroiliac joint dysfunction  -     Ambulatory referral/consult to Back & Spine Clinic; Future    Pain in unspecified hip  -     Ambulatory referral/consult to Orthopedics    Chronic left-sided low back pain without sciatica  -     Ambulatory referral/consult to Back & Spine Clinic; Future    I believe most of her symptoms are spine/ SI joint related. Minimal degenerative findings noted in the hip. Groin pain most likely from SI joint. She has yet to see a spine specialist or have a MRI of her back. I will refer her to  one at this time for further eval.  I did offer to set her up with IR for left hip joint injection but she declined. Do not think she will get much relief from this injection anyway.     Diagnoses and plan discussed with the patient, as well as the expected course and duration of his symptoms.  All questions and concerns were addressed prior to the end of the visit.   Instructed patient to call office if they have any future questions/concerns or to schedule apt. Patient will return to see me if symptoms worsen or fail to improve    Note dictated with voice recognition software, please excuse any grammatical errors.        Aleena Perdue PA-C   11/30/2022

## 2022-12-01 ENCOUNTER — PATIENT MESSAGE (OUTPATIENT)
Dept: RHEUMATOLOGY | Facility: CLINIC | Age: 67
End: 2022-12-01
Payer: MEDICARE

## 2022-12-05 ENCOUNTER — TELEPHONE (OUTPATIENT)
Dept: OTOLARYNGOLOGY | Facility: CLINIC | Age: 67
End: 2022-12-05
Payer: MEDICARE

## 2022-12-05 DIAGNOSIS — J32.0 CHRONIC MAXILLARY SINUSITIS: Primary | ICD-10-CM

## 2022-12-05 RX ORDER — FLUCONAZOLE 150 MG/1
150 TABLET ORAL DAILY
Qty: 7 TABLET | Refills: 0 | Status: SHIPPED | OUTPATIENT
Start: 2022-12-05 | End: 2022-12-13 | Stop reason: SDUPTHER

## 2022-12-05 NOTE — TELEPHONE ENCOUNTER
----- Message from Duglas Pan MA sent at 12/5/2022 10:51 AM CST -----  Regarding: FW: Pt Advice  Contact: self [phone]    ----- Message -----  From: Hansavargas Crowe  Sent: 12/5/2022  10:48 AM CST  To: Russel Armstrong Staff  Subject: Pt Advice                                        Pt is calling stating that she needs more Diflucan to help clear up the fungus. Please call.      [id number] - [place] - [address] AT [facility] [address]  [address] [address]  Phone: [phone] Fax: [phone]

## 2022-12-05 NOTE — TELEPHONE ENCOUNTER
Spoke with patient to let her know that  sent over a refill request to her pharmacy of Diflucan.

## 2022-12-07 ENCOUNTER — SPECIALTY PHARMACY (OUTPATIENT)
Dept: PHARMACY | Facility: CLINIC | Age: 67
End: 2022-12-07
Payer: MEDICARE

## 2022-12-07 ENCOUNTER — TELEPHONE (OUTPATIENT)
Dept: ENDOSCOPY | Facility: HOSPITAL | Age: 67
End: 2022-12-07
Payer: MEDICARE

## 2022-12-07 NOTE — TELEPHONE ENCOUNTER
Spoke with patient about arrival time @ 0800.   Covid test = boosted    Prep instructions reviewed: the day before the procedure, follow a clear liquid diet all day, then start the first 1/2 of prep at 5pm and take 2nd 1/2 of prep @ 0300.  Pt must be completely NPO when prep completed @ 0500.              Medications: Do not take Insulin or oral diabetic medications the day of the procedure.  Take as prescribed: heart, seizure and blood pressure medication in the morning with a sip of water (less than an ounce).  Take any breathing medications and bring inhalers to hospital with you Leave all valuables and jewelry at home.     Wear comfortable clothes to procedure to change into hospital gown You cannot drive for 24 hours after your procedure because you will receive sedation for your procedure to make you comfortable.  A ride must be provided at discharge.

## 2022-12-09 ENCOUNTER — ANESTHESIA EVENT (OUTPATIENT)
Dept: ENDOSCOPY | Facility: HOSPITAL | Age: 67
End: 2022-12-09
Payer: MEDICARE

## 2022-12-09 ENCOUNTER — HOSPITAL ENCOUNTER (OUTPATIENT)
Facility: HOSPITAL | Age: 67
Discharge: HOME | End: 2022-12-09
Attending: INTERNAL MEDICINE | Admitting: INTERNAL MEDICINE
Payer: MEDICARE

## 2022-12-09 ENCOUNTER — ANESTHESIA (OUTPATIENT)
Dept: ENDOSCOPY | Facility: HOSPITAL | Age: 67
End: 2022-12-09
Payer: MEDICARE

## 2022-12-09 VITALS
OXYGEN SATURATION: 99 % | DIASTOLIC BLOOD PRESSURE: 70 MMHG | HEIGHT: 62 IN | TEMPERATURE: 99 F | HEART RATE: 81 BPM | WEIGHT: 103 LBS | SYSTOLIC BLOOD PRESSURE: 147 MMHG | BODY MASS INDEX: 18.95 KG/M2 | RESPIRATION RATE: 20 BRPM

## 2022-12-09 DIAGNOSIS — Z12.12 ENCOUNTER FOR COLORECTAL CANCER SCREENING: ICD-10-CM

## 2022-12-09 DIAGNOSIS — Z12.11 ENCOUNTER FOR COLORECTAL CANCER SCREENING: ICD-10-CM

## 2022-12-09 PROCEDURE — D9220A PRA ANESTHESIA: Mod: PT,CRNA,, | Performed by: NURSE ANESTHETIST, CERTIFIED REGISTERED

## 2022-12-09 PROCEDURE — D9220A PRA ANESTHESIA: ICD-10-PCS | Mod: PT,CRNA,, | Performed by: NURSE ANESTHETIST, CERTIFIED REGISTERED

## 2022-12-09 PROCEDURE — 88305 TISSUE EXAM BY PATHOLOGIST: ICD-10-PCS | Mod: 26,,, | Performed by: PATHOLOGY

## 2022-12-09 PROCEDURE — 27201089 HC SNARE, DISP (ANY): Performed by: INTERNAL MEDICINE

## 2022-12-09 PROCEDURE — D9220A PRA ANESTHESIA: Mod: PT,ANES,, | Performed by: ANESTHESIOLOGY

## 2022-12-09 PROCEDURE — 25000003 PHARM REV CODE 250: Performed by: INTERNAL MEDICINE

## 2022-12-09 PROCEDURE — 25000003 PHARM REV CODE 250: Performed by: NURSE ANESTHETIST, CERTIFIED REGISTERED

## 2022-12-09 PROCEDURE — 45385 COLONOSCOPY W/LESION REMOVAL: CPT | Mod: PT,,, | Performed by: INTERNAL MEDICINE

## 2022-12-09 PROCEDURE — 37000008 HC ANESTHESIA 1ST 15 MINUTES: Performed by: INTERNAL MEDICINE

## 2022-12-09 PROCEDURE — 45385 COLONOSCOPY W/LESION REMOVAL: CPT | Mod: PT | Performed by: INTERNAL MEDICINE

## 2022-12-09 PROCEDURE — 43264 ERCP REMOVE DUCT CALCULI: CPT | Performed by: INTERNAL MEDICINE

## 2022-12-09 PROCEDURE — 45385 PR COLONOSCOPY,REMV LESN,SNARE: ICD-10-PCS | Mod: PT,,, | Performed by: INTERNAL MEDICINE

## 2022-12-09 PROCEDURE — 63600175 PHARM REV CODE 636 W HCPCS: Performed by: NURSE ANESTHETIST, CERTIFIED REGISTERED

## 2022-12-09 PROCEDURE — 88305 TISSUE EXAM BY PATHOLOGIST: CPT | Performed by: PATHOLOGY

## 2022-12-09 PROCEDURE — 74328 X-RAY BILE DUCT ENDOSCOPY: CPT | Mod: TC | Performed by: INTERNAL MEDICINE

## 2022-12-09 PROCEDURE — 88305 TISSUE EXAM BY PATHOLOGIST: CPT | Mod: 26,,, | Performed by: PATHOLOGY

## 2022-12-09 PROCEDURE — 37000009 HC ANESTHESIA EA ADD 15 MINS: Performed by: INTERNAL MEDICINE

## 2022-12-09 PROCEDURE — D9220A PRA ANESTHESIA: ICD-10-PCS | Mod: PT,ANES,, | Performed by: ANESTHESIOLOGY

## 2022-12-09 RX ORDER — SODIUM CHLORIDE 0.9 % (FLUSH) 0.9 %
10 SYRINGE (ML) INJECTION
Status: DISCONTINUED | OUTPATIENT
Start: 2022-12-09 | End: 2022-12-09 | Stop reason: HOSPADM

## 2022-12-09 RX ORDER — PROPOFOL 10 MG/ML
VIAL (ML) INTRAVENOUS CONTINUOUS PRN
Status: DISCONTINUED | OUTPATIENT
Start: 2022-12-09 | End: 2022-12-09

## 2022-12-09 RX ORDER — SODIUM CHLORIDE 9 MG/ML
INJECTION, SOLUTION INTRAVENOUS CONTINUOUS
Status: DISCONTINUED | OUTPATIENT
Start: 2022-12-09 | End: 2022-12-09 | Stop reason: HOSPADM

## 2022-12-09 RX ORDER — PROPOFOL 10 MG/ML
VIAL (ML) INTRAVENOUS
Status: DISCONTINUED | OUTPATIENT
Start: 2022-12-09 | End: 2022-12-09

## 2022-12-09 RX ORDER — LIDOCAINE HYDROCHLORIDE 20 MG/ML
INJECTION, SOLUTION EPIDURAL; INFILTRATION; INTRACAUDAL; PERINEURAL
Status: DISCONTINUED | OUTPATIENT
Start: 2022-12-09 | End: 2022-12-09

## 2022-12-09 RX ADMIN — SODIUM CHLORIDE: 0.9 INJECTION, SOLUTION INTRAVENOUS at 08:12

## 2022-12-09 RX ADMIN — PROPOFOL 150 MCG/KG/MIN: 10 INJECTION, EMULSION INTRAVENOUS at 09:12

## 2022-12-09 RX ADMIN — LIDOCAINE HYDROCHLORIDE 40 MG: 20 INJECTION, SOLUTION EPIDURAL; INFILTRATION; INTRACAUDAL; PERINEURAL at 09:12

## 2022-12-09 RX ADMIN — SODIUM CHLORIDE: 0.9 INJECTION, SOLUTION INTRAVENOUS at 09:12

## 2022-12-09 RX ADMIN — PROPOFOL 50 MG: 10 INJECTION, EMULSION INTRAVENOUS at 09:12

## 2022-12-09 NOTE — TRANSFER OF CARE
"Anesthesia Transfer of Care Note    Patient: Bhavna Landry    Procedure(s) Performed: Procedure(s) (LRB):  COLONOSCOPY (N/A)    Patient location: GI    Anesthesia Type: general    Transport from OR: Transported from OR on room air with adequate spontaneous ventilation    Post pain: adequate analgesia    Post assessment: no apparent anesthetic complications and tolerated procedure well    Post vital signs: stable    Level of consciousness: awake and alert    Nausea/Vomiting: no nausea/vomiting    Complications: none    Transfer of care protocol was followed      Last vitals:   Visit Vitals  BP (!) 134/58 (BP Location: Left arm, Patient Position: Lying)   Pulse 64   Temp 37.1 °C (98.8 °F) (Temporal)   Resp 13   Ht 5' 2" (1.575 m)   Wt 46.7 kg (103 lb)   SpO2 99%   Breastfeeding No   BMI 18.84 kg/m²     "

## 2022-12-09 NOTE — ANESTHESIA POSTPROCEDURE EVALUATION
Anesthesia Post Evaluation    Patient: Bhavna Landry    Procedure(s) Performed: Procedure(s) (LRB):  COLONOSCOPY (N/A)    Final Anesthesia Type: general      Patient location during evaluation: GI PACU  Patient participation: Yes- Able to Participate  Level of consciousness: awake and alert  Post-procedure vital signs: reviewed and stable  Pain management: adequate  Airway patency: patent    PONV status at discharge: No PONV  Anesthetic complications: no      Cardiovascular status: blood pressure returned to baseline and hemodynamically stable  Respiratory status: unassisted, spontaneous ventilation and room air  Hydration status: euvolemic  Follow-up not needed.          Vitals Value Taken Time   /56 12/09/22 0954   Temp 98.1 12/09/22 0954   Pulse 57 12/09/22 0954   Resp 16 12/09/22 0954   SpO2 100 12/09/22 0954         No case tracking events are documented in the log.      Pain/Azra Score: No data recorded

## 2022-12-09 NOTE — ANESTHESIA PREPROCEDURE EVALUATION
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, estrogen receptor positive 2022    Cataract     Depression     Dry eyes     Dry mouth     Duane's syndrome of right eye     Early dry stage nonexudative age-related macular degeneration of both eyes 2022    Fibromyalgia 2014    Fibromyalgia     Fractured hip     RIGHT HIP    GERD (gastroesophageal reflux disease)     History of psychiatric hospitalization     Hyperlipidemia 1992    Hypertension     Hypocalcemia     Hyponatremia     Kidney stone     Migraine headache     Osteoporosis     Psoriatic arthritis 1992    Right knee pain     post knee replacement surgery (possible rejectiion of metal)    RLS (restless legs syndrome)     Schizophrenia 1992    stable on meds    Squamous cell carcinoma of skin     Urinary tract infection      Past Surgical History:   Procedure Laterality Date    brow ptosis repair Right 2019    Surgeon: Dr. Karla Henson    CATARACT EXTRACTION       SECTION      COLONOSCOPY N/A 2016    Procedure: COLONOSCOPY;  Surgeon: ANDRIA Connelly MD;  Location: [address]);  Service: Endoscopy;  Laterality: N/A;    cyst removed from right sinus  1982    HYSTERECTOMY      VAGINAL HYSTERECTOMY WITHOUT BSO - ENDOMETRIOSIS    INJECTION FOR SENTINEL NODE IDENTIFICATION Right 2022    Procedure: INJECTION, FOR SENTINEL NODE IDENTIFICATION-Right;  Surgeon: NEAL Dhillon MD;  Location: [facility];  Service: General;  Laterality: Right;    INJECTION OF ANESTHETIC AGENT AROUND MEDIAL BRANCH NERVES INNERVATING LUMBAR FACET JOINT N/A 2019     Procedure: Lumbo-sacral Block, DR5 and Lateral Branches of S1,S2, S3;  Surgeon: Michelle Pineda Jr., MD;  [place] [facility]  Service: Pain Management;  Laterality: N/A;  Pt takes  and states she holds ASA on her own whenever she has procedures.  Instructed to hold x 3 days prior to procedure.      INJECTION OF ANESTHETIC AGENT INTO SACROILIAC JOINT Right 8/30/2018    Procedure: BLOCK, SACROILIAC JOINT-Right- ORAL SEDATION;  Surgeon: Michelle Pineda Jr., MD;  [place] [facility];  Service: Pain Management;  Laterality: Right;    INJECTION OF ANESTHETIC AGENT INTO SACROILIAC JOINT Bilateral 9/27/2018    Procedure: BLOCK, SACROILIAC JOINT-BILATERAL;  Surgeon: Michelle Pineda Jr., MD;  Location: [facility];  Service: Pain Management;  Laterality: Bilateral;  Please keep at 10:00 due to trasnsportation    INJECTION OF ANESTHETIC AGENT INTO SACROILIAC JOINT Bilateral 2/7/2019    Procedure: Bilateral Sacroiliac Joint Injection - Per Dr Pineda, not necessary to hold ASA.;  Surgeon: Michelle Pineda Jr., MD;  Location: [facility];  Service: Pain Management;  Laterality: Bilateral;    INTRAOCULAR PROSTHESES INSERTION Right 8/1/2019    Procedure: INSERTION, IOL PROSTHESIS;  Surgeon: Karen Song MD;  Location: [address];  Service: Ophthalmology;  Laterality: Right;    INTRAOCULAR PROSTHESES INSERTION Left 9/26/2019    Procedure: INSERTION, IOL PROSTHESIS;  Surgeon: Karen Song MD;  Location: [address];  Service: Ophthalmology;  Laterality: Left;    JOINT REPLACEMENT Right     knee    KNEE SURGERY      MASTECTOMY Right 5/9/2022    Procedure: MASTECTOMY-Right;  Surgeon: NEAL Dhillon MD;  Location: [facility];  Service: General;  Laterality: Right;  2.5 HOURS    PHACOEMULSIFICATION OF CATARACT Right 8/1/2019    Procedure: PHACOEMULSIFICATION, CATARACT;  Surgeon: Karen Song MD;  Location: [address];  Service: Ophthalmology;  Laterality: Right;     PHACOEMULSIFICATION OF CATARACT Left 9/26/2019    Procedure: PHACOEMULSIFICATION, CATARACT;  Surgeon: Karen Song MD;  Location: [address];  Service: Ophthalmology;  Laterality: Left;    RADIOFREQUENCY THERMOCOAGULATION Right 5/7/2019    Procedure: RADIOFREQUENCY THERMAL COAGULATION RIGHT DORSAL RAMUS 5 AND LATERAL BRANCH OF S1, S2 AND S3;  Surgeon: Michelle Pineda Jr., MD;  Location: [facility];  Service: Pain Management;  Laterality: Right;    RADIOFREQUENCY THERMOCOAGULATION Left 5/14/2019    Procedure: RADIOFREQUENCY THERMAL COAGULATION LEFT DORSAL RAMUS 5 AND LATERAL BRANCH OF S1,S2 AND S3;  Surgeon: Michelle Pineda Jr., MD;  Location: [facility];  Service: Pain Management;  Laterality: Left;    RADIOFREQUENCY THERMOCOAGULATION Right 10/22/2019    Procedure: RADIOFREQUENCY THERMAL COAGULATION---DARSAL RAMUS 5 and LATERAL S1,S2,and S3 Right;  Surgeon: Michelle Pineda Jr., MD;  Location: [facility];  Service: Pain Management;  Laterality: Right;  patient to sign consent DOS    RADIOFREQUENCY THERMOCOAGULATION Left 10/29/2019    Procedure: RADIOFREQUENCY THERMAL COAGULATION - LEFT - DR5, S1,S2, AND S3;  Surgeon: Michelle Pineda Jr., MD;  Location: [facility];  Service: Pain Management;  Laterality: Left;    RADIOFREQUENCY THERMOCOAGULATION Left 5/26/2020    Procedure: RADIOFREQUENCY THERMAL COAGULATION--Left DR5+ lateral branches of S1, S2, S3;  Surgeon: Michelle Pineda Jr., MD;  Location: [facility];  Service: Pain Management;  Laterality: Left;    RADIOFREQUENCY THERMOCOAGULATION Right 6/2/2020    Procedure: RADIOFREQUENCY THERMAL COAGULATION--Right DR5+ lateral branches of S1, S2, S3;  Surgeon: Michelle Pineda Jr., MD;  Location: [facility];  Service: Pain Management;  Laterality: Right;    SENTINEL LYMPH NODE BIOPSY Right 5/9/2022    Procedure: BIOPSY, LYMPH NODE, SENTINEL-Right;  Surgeon: NEAL Dhillon MD;  Location: [facility];  Service: General;  Laterality:  Right;    Surgery on right knee  07/09/1982    tumor removed from back left side upper shoulder  03/17/2006           Pre-op Assessment    I have reviewed the Patient Summary Reports.     I have reviewed the Nursing Notes. I have reviewed the NPO Status.      Review of Systems  Anesthesia Hx:  History of prior surgery of interest to airway management or planning:  Denies Personal Hx of Anesthesia complications.   Hematology/Oncology:         -- Cancer in past history: Breast   Cardiovascular:   Hypertension    Renal/:   Chronic Renal Disease    Hepatic/GI:   GERD    Neurological:   Neuromuscular Disease, Headaches    Psych:   Psychiatric History          Physical Exam  General: Well nourished    Airway:  Mallampati: II   Mouth Opening: Normal        Anesthesia Plan  Type of Anesthesia, risks & benefits discussed:    Anesthesia Type: Gen Natural Airway  Informed Consent: Informed consent signed with the Patient and all parties understand the risks and agree with anesthesia plan.  All questions answered.   ASA Score: 3    Ready For Surgery From Anesthesia Perspective.     .

## 2022-12-09 NOTE — H&P
Short Stay Endoscopy History and Physical    PCP - Sadaf Vargas MD  Referring Physician - Yue Davis RN  No address on file    Procedure - colonoscopy  ASA - per anesthesia  Mallampati - per anesthesia  History of Anesthesia problems - no  Family history Anesthesia problems -  no   Plan of anesthesia - General    HPI:  This is a 67 y.o. 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    Surgical History:  has a past surgical history that includes Surgery on right knee (1982); cyst removed from right sinus (1982); tumor removed from back left side upper shoulder (2006); Hysterectomy; Knee surgery;  section; Joint replacement (Right); Colonoscopy (N/A, 2016); Injection of anesthetic agent into sacroiliac joint (Right, 2018); Injection of anesthetic agent into sacroiliac joint (Bilateral, 2018); Injection of anesthetic agent into  sacroiliac joint (Bilateral, 2/7/2019); brow ptosis repair (Right, 03/04/2019); Injection of anesthetic agent around medial branch nerves innervating lumbar facet joint (N/A, 4/11/2019); Radiofrequency thermocoagulation (Right, 5/7/2019); Radiofrequency thermocoagulation (Left, 5/14/2019); Phacoemulsification of cataract (Right, 8/1/2019); Intraocular prosthesis insertion (Right, 8/1/2019); Cataract extraction; Phacoemulsification of cataract (Left, 9/26/2019); Intraocular prosthesis insertion (Left, 9/26/2019); Radiofrequency thermocoagulation (Right, 10/22/2019); Radiofrequency thermocoagulation (Left, 10/29/2019); Radiofrequency thermocoagulation (Left, 5/26/2020); Radiofrequency thermocoagulation (Right, 6/2/2020); Mastectomy (Right, 5/9/2022); [place] lymph node biopsy (Right, 5/9/2022); and Injection for sentinel node identification (Right, 5/9/2022).    Family History: family history includes Arthritis in her brother; Cancer in her father and mother; Colon cancer in her mother; Depression in her mother; Diabetes in her brother; Heart disease in her brother; No Known Problems in her daughter; Psoriasis in her mother; Stroke in her sister..    Social History:  reports that she has been smoking cigarettes. She has a 2.50 pack-year smoking history. She has never been exposed to tobacco smoke. She has quit using smokeless tobacco. She reports that she does not drink alcohol and does not use drugs.    Review of patient's allergies indicates:   Allergen Reactions    Etanercept Other (See Comments)     Other reaction(s): recurrent infections    Chloramphenicol sod succinate Hives    Codeine Other (See Comments)     Other reaction(s): Stomach upset. Pt states OK with Percocet    Nickel sutures [surgical stainless steel] Dermatitis     Allergic contact dermatitis    Adhesive Rash       Medications:   Medications Prior to Admission   Medication Sig Dispense Refill Last Dose    amoxicillin-clavulanate 875-125mg  (AUGMENTIN) 875-125 mg per tablet Take 1 tablet by mouth 2 (two) times daily. 20 tablet 0 12/8/2022    anastrozole (ARIMIDEX) 1 mg Tab Take 1 tablet (1 mg total) by mouth once daily. 90 tablet 3 Past Month    ascorbic acid, vitamin C, (VITAMIN C) 500 MG tablet Take 500 mg by mouth once daily.   12/8/2022    aspirin (ECOTRIN) 81 MG EC tablet Take 81 mg by mouth once daily.   Past Week    benztropine (COGENTIN) 0.5 MG tablet Take 1 tablet (0.5 mg total) by mouth 2 (two) times daily. 180 tablet 3 12/8/2022    cyanocobalamin 500 MCG tablet Take 500 mcg by mouth once daily.   12/8/2022    fluconazole (DIFLUCAN) 150 MG Tab Take 1 tablet (150 mg total) by mouth once daily. for 7 days 7 tablet 0 12/8/2022    folic acid (FOLVITE) 1 MG tablet Take 1 tablet (1 mg total) by mouth once daily. 90 tablet 3 12/8/2022    gabapentin (NEURONTIN) 300 MG capsule Take 300 mg by mouth 3 (three) times daily.   12/8/2022    isosorbide mononitrate (IMDUR) 30 MG 24 hr tablet TAKE 1 TABLET(30 MG) BY MOUTH EVERY DAY 30 tablet 11 12/8/2022    lisinopriL (PRINIVIL,ZESTRIL) 5 MG tablet Take 1 tablet (5 mg total) by mouth once daily. 90 tablet 3 12/8/2022    LUTEIN ORAL Take by mouth.   12/8/2022    methotrexate 2.5 MG Tab Take 4 tablets (10 mg total) by mouth every 7 days. 16 tablet 0 12/8/2022    omega-3 fatty acids/fish oil (FISH OIL-OMEGA-3 FATTY ACIDS) 300-1,000 mg capsule Take by mouth once daily.   12/8/2022    risperiDONE (RISPERDAL) 2 MG tablet TAKE 1 TABLET(2 MG) BY MOUTH EVERY MORNING 90 tablet 0 12/8/2022    risperiDONE (RISPERDAL) 4 MG tablet TAKE 1 TABLET(4 MG) BY MOUTH EVERY EVENING 90 tablet 3 12/8/2022    tofacitinib citrate (XELJANZ ORAL) Take by mouth.   12/8/2022    venlafaxine (EFFEXOR-XR) 75 MG 24 hr capsule TAKE 1 CAPSULE(75 MG) BY MOUTH EVERY DAY 30 capsule 0 12/8/2022    VITAMIN A ORAL Take by mouth.   12/8/2022    vitamin D (VITAMIN D3) 1000 units Tab Take 1,000 Units by mouth once daily.   12/8/2022    vitamin E 200 UNIT  capsule Take 200 Units by mouth once daily.   12/8/2022    zinc gluconate 50 mg tablet Take 50 mg by mouth once daily.   12/8/2022    albuterol (PROAIR HFA) 90 mcg/actuation inhaler 2 puffs qid prn 54 g 3 More than a month    amLODIPine (NORVASC) 5 MG tablet Take 1 tablet (5 mg total) by mouth every evening. 90 tablet 1     atorvastatin (LIPITOR) 80 MG tablet TAKE 1 TABLET(80 MG) BY MOUTH EVERY DAY 90 tablet 3     fluticasone propionate (FLONASE) 50 mcg/actuation nasal spray 2 sprays (100 mcg total) by Each Nostril route once daily. 16 g 2 More than a month    multivitamin (THERAGRAN) per tablet Take 1 tablet by mouth once daily.   More than a month       Physical Exam:    Vital Signs:   Vitals:    12/09/22 0838   BP: (!) 147/92   Pulse: 79   Resp: 16   Temp: 98.2 °F (36.8 °C)       General Appearance: Well appearing in no acute distress    Labs:  Lab Results   Component Value Date    WBC 8.95 11/07/2022    HGB 12.3 11/07/2022    HCT 37.9 11/07/2022     11/07/2022    CHOL 169 05/23/2022    TRIG 53 05/23/2022     (H) 05/23/2022    ALT 19 11/07/2022    AST 26 11/07/2022     (L) 11/07/2022    K 4.5 11/07/2022     11/07/2022    CREATININE 0.9 11/07/2022    BUN 7 (L) 11/07/2022    CO2 26 11/07/2022    TSH 2.265 03/30/2021    INR 1.0 02/14/2014    GLUF 100 07/01/2009    HGBA1C 5.3 12/11/2018       I have explained the risks and benefits of this endoscopic procedure to the patient including but not limited to bleeding, inflammation, infection, perforation, and death.      Mikey Walters MD

## 2022-12-09 NOTE — [employer] PATIENT INSTRUCTIONS
Discharge Summary/Instructions after an Endoscopic Procedure  Patient Name: Bhavna Pierre  Patient MRN: [id number]  Patient YOB: 1955 Friday, December 9, 2022  Mikey Walters MD  Dear patient,  As a result of recent federal legislation (The Federal Cures Act), you may   receive lab or pathology results from your procedure in your [facility]   account before your physician is able to contact you. Your physician or   their representative will relay the results to you with their   recommendations at their soonest availability.  Thank you,  Your health is very important to us during the Covid Crisis. Following your   procedure today, you will receive a daily text for 2 weeks asking about   signs or symptoms of Covid 19.  Please respond to this text when you   receive it so we can follow up and keep you as safe as possible.   RESTRICTIONS:  During your procedure today, you received medications for sedation.  These   medications may affect your judgment, balance and coordination.  Therefore,   for 24 hours, you have the following restrictions:   - DO NOT drive a car, operate machinery, make legal/financial decisions,   sign important papers or drink alcohol.    ACTIVITY:  Today: no heavy lifting, straining or running due to procedural   sedation/anesthesia.  The following day: return to full activity including work.  DIET:  Eat and drink normally unless instructed otherwise.     TREATMENT FOR COMMON SIDE EFFECTS:  - Mild abdominal pain, nausea, belching, bloating or excessive gas:  rest,   eat lightly and use a heating pad.  - Sore Throat: treat with throat lozenges and/or gargle with warm salt   water.  - Because air was used during the procedure, expelling large amounts of air   from your rectum or belching is normal.  - If a bowel prep was taken, you may not have a bowel movement for 1-3 days.    This is normal.  SYMPTOMS TO WATCH FOR AND REPORT TO YOUR PHYSICIAN:  1. Abdominal pain or bloating, other than gas  cramps.  2. Chest pain.  3. Back pain.  4. Signs of infection such as: chills or fever occurring within 24 hours   after the procedure.  5. Rectal bleeding, which would show as bright red, maroon, or black stools.   (A tablespoon of blood from the rectum is not serious, especially if   hemorrhoids are present.)  6. Vomiting.  7. Weakness or dizziness.  GO DIRECTLY TO THE NEAREST EMERGENCY ROOM IF YOU HAVE ANY OF THE FOLLOWING:      Difficulty breathing              Chills and/or fever over 101 F   Persistent vomiting and/or vomiting blood   Severe abdominal pain   Severe chest pain   Black, tarry stools   Bleeding- more than one tablespoon   Any other symptom or condition that you feel may need urgent attention  Your doctor recommends these additional instructions:  If any biopsies were taken, your doctors clinic will contact you in 1 to 2   weeks with any results.  - Discharge patient to home.   - Resume previous diet.   - Continue present medications.   - Await pathology results.   - Repeat colonoscopy in 3 years for surveillance.   - Return to primary care physician at appointment to be scheduled.  For questions, problems or results please call your physician - Mikey Walters MD.  EMERGENCY PHONE NUMBER: [phone],  LAB RESULTS: [phone]  IF A COMPLICATION OR EMERGENCY SITUATION ARISES AND YOU ARE UNABLE TO REACH   YOUR PHYSICIAN - GO DIRECTLY TO THE EMERGENCY ROOM.  Mikey Walters MD  12/9/2022 9:55:56 AM  This report has been verified and signed electronically.  Dear patient,  As a result of recent federal legislation (The Federal Cures Act), you may   receive lab or pathology results from your procedure in your [facility]   account before your physician is able to contact you. Your physician or   their representative will relay the results to you with their   recommendations at their soonest availability.  Thank you,  [employer]

## 2022-12-12 ENCOUNTER — TELEPHONE (OUTPATIENT)
Dept: OTOLARYNGOLOGY | Facility: CLINIC | Age: 67
End: 2022-12-12
Payer: MEDICARE

## 2022-12-12 NOTE — TELEPHONE ENCOUNTER
----- Message from Tegan Leon sent at 12/12/2022 10:16 AM CST -----  Regarding: advice  Contact: [phone]  Bhavna Landry calling regarding Patient Advice (message) for #fluconazole (DIFLUCAN) 150 MG Tab. Want to know how many time you can refill it?  She also have other questions

## 2022-12-13 ENCOUNTER — TELEPHONE (OUTPATIENT)
Dept: OTOLARYNGOLOGY | Facility: CLINIC | Age: 67
End: 2022-12-13
Payer: MEDICARE

## 2022-12-13 DIAGNOSIS — J32.0 CHRONIC MAXILLARY SINUSITIS: ICD-10-CM

## 2022-12-13 RX ORDER — FLUCONAZOLE 150 MG/1
150 TABLET ORAL DAILY
Qty: 14 TABLET | Refills: 0 | Status: SHIPPED | OUTPATIENT
Start: 2022-12-13 | End: 2022-12-27

## 2022-12-13 NOTE — TELEPHONE ENCOUNTER
I spoke with her.  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  Will refill fluconazole for 14 additional days.  Urged to schedule f/u visit for after that to have exam.

## 2022-12-15 ENCOUNTER — TELEPHONE (OUTPATIENT)
Dept: RHEUMATOLOGY | Facility: CLINIC | Age: 67
End: 2022-12-15
Payer: MEDICARE

## 2022-12-15 DIAGNOSIS — L40.50 PSORIATIC ARTHRITIS: ICD-10-CM

## 2022-12-15 RX ORDER — FOLIC ACID 1 MG/1
1000 TABLET ORAL DAILY
Qty: 90 TABLET | Refills: 3 | Status: SHIPPED | OUTPATIENT
Start: 2022-12-15 | End: 2023-08-22 | Stop reason: SDUPTHER

## 2022-12-15 RX ORDER — METHOTREXATE 2.5 MG/1
10 TABLET ORAL
Qty: 16 TABLET | Refills: 0 | Status: SHIPPED | OUTPATIENT
Start: 2022-12-15 | End: 2023-01-12

## 2022-12-16 NOTE — TELEPHONE ENCOUNTER
Jessica please schedule f/u appt with me  @ 8:30 am. Thank you YEN            Spoke with patient. Pt last seen  at which time tofacitinib stopped d/t lack of efficacy but then patient felt it was in fact helping and wanted to resume so we re-ordered with plan to reassess in 3 months and then decide about change to guselkumab(Tremfya) and shortly thereafter in March diagnosed with breast cancer. She was to change to guselkumab and Rx sent to [facility]  x2 but pt never received so she continued tofacitinib, which the  continued to ship without my refilling(although last Rx was written 22  22).  Was also on methotrexate 10mg once a week.     22:MD Ro De La Cruz MA  Caller: pt @ [phone] (Yesterday,  2:32 PM)  Please tell breast cancer is common in otherwise healthy people and patients taking or not taking medication. Xeljanz recently has been found to have very slightly higher risk of cancer compared to Enbrel or Humira. However, we stopped that any way as it wasn't helping. We had sent Rx for Tremfya instead. Are you taking it?  In any event would not start or continue Tremfya either until they decide what therapy is needed for the breast cancer. Would continue methotrexate as it has been used in much higher doses to treat breast cancer. Let me know what breast cancer therapy you need and we can decide on medication for the psoriatic arthritis YEN        Explained she cannot stay on tofacitinib given h/o breast cancer  Will refill methotrexate 10mg po once a week with folic acid 1mg daily  Will schedule f/u in  and decide on addition of guselkumab as originally planned last Feb.

## 2022-12-20 ENCOUNTER — OFFICE VISIT (OUTPATIENT)
Dept: HEMATOLOGY/ONCOLOGY | Facility: CLINIC | Age: 67
End: 2022-12-20
Payer: MEDICARE

## 2022-12-20 VITALS
BODY MASS INDEX: 17.65 KG/M2 | WEIGHT: 103.38 LBS | DIASTOLIC BLOOD PRESSURE: 31 MMHG | TEMPERATURE: 98 F | HEART RATE: 70 BPM | OXYGEN SATURATION: 99 % | HEIGHT: 64 IN | SYSTOLIC BLOOD PRESSURE: 130 MMHG

## 2022-12-20 DIAGNOSIS — M25.50 AROMATASE INHIBITOR-ASSOCIATED ARTHRALGIA: ICD-10-CM

## 2022-12-20 DIAGNOSIS — C50.411 CARCINOMA OF UPPER-OUTER QUADRANT OF RIGHT BREAST IN FEMALE, ESTROGEN RECEPTOR POSITIVE: Primary | ICD-10-CM

## 2022-12-20 DIAGNOSIS — M85.89 OSTEOPENIA OF MULTIPLE SITES: ICD-10-CM

## 2022-12-20 DIAGNOSIS — M25.559 PAIN IN UNSPECIFIED HIP: ICD-10-CM

## 2022-12-20 DIAGNOSIS — Z17.0 CARCINOMA OF UPPER-OUTER QUADRANT OF RIGHT BREAST IN FEMALE, ESTROGEN RECEPTOR POSITIVE: Primary | ICD-10-CM

## 2022-12-20 DIAGNOSIS — K63.5 POLYP OF COLON, UNSPECIFIED PART OF COLON, UNSPECIFIED TYPE: ICD-10-CM

## 2022-12-20 DIAGNOSIS — T45.1X5A AROMATASE INHIBITOR-ASSOCIATED ARTHRALGIA: ICD-10-CM

## 2022-12-20 PROCEDURE — 1160F RVW MEDS BY RX/DR IN RCRD: CPT | Mod: CPTII,S$GLB,, | Performed by: INTERNAL MEDICINE

## 2022-12-20 PROCEDURE — 1160F PR REVIEW ALL MEDS BY PRESCRIBER/CLIN PHARMACIST DOCUMENTED: ICD-10-PCS | Mod: CPTII,S$GLB,, | Performed by: INTERNAL MEDICINE

## 2022-12-20 PROCEDURE — 3288F FALL RISK ASSESSMENT DOCD: CPT | Mod: CPTII,S$GLB,, | Performed by: INTERNAL MEDICINE

## 2022-12-20 PROCEDURE — 99499 RISK ADDL DX/OHS AUDIT: ICD-10-PCS | Mod: S$GLB,,, | Performed by: STUDENT IN AN ORGANIZED HEALTH CARE EDUCATION/TRAINING PROGRAM

## 2022-12-20 PROCEDURE — 3288F PR FALLS RISK ASSESSMENT DOCUMENTED: ICD-10-PCS | Mod: CPTII,S$GLB,, | Performed by: INTERNAL MEDICINE

## 2022-12-20 PROCEDURE — 3008F PR BODY MASS INDEX (BMI) DOCUMENTED: ICD-10-PCS | Mod: CPTII,S$GLB,, | Performed by: INTERNAL MEDICINE

## 2022-12-20 PROCEDURE — 3078F PR MOST RECENT DIASTOLIC BLOOD PRESSURE < 80 MM HG: ICD-10-PCS | Mod: CPTII,S$GLB,, | Performed by: INTERNAL MEDICINE

## 2022-12-20 PROCEDURE — 99999 PR PBB SHADOW E&M-EST. PATIENT-LVL III: ICD-10-PCS | Mod: PBBFAC,,, | Performed by: INTERNAL MEDICINE

## 2022-12-20 PROCEDURE — 99999 PR PBB SHADOW E&M-EST. PATIENT-LVL III: CPT | Mod: PBBFAC,,, | Performed by: INTERNAL MEDICINE

## 2022-12-20 PROCEDURE — 4010F PR ACE/ARB THEARPY RXD/TAKEN: ICD-10-PCS | Mod: CPTII,S$GLB,, | Performed by: INTERNAL MEDICINE

## 2022-12-20 PROCEDURE — 1101F PR PT FALLS ASSESS DOC 0-1 FALLS W/OUT INJ PAST YR: ICD-10-PCS | Mod: CPTII,S$GLB,, | Performed by: INTERNAL MEDICINE

## 2022-12-20 PROCEDURE — 3075F PR MOST RECENT SYSTOLIC BLOOD PRESS GE 130-139MM HG: ICD-10-PCS | Mod: CPTII,S$GLB,, | Performed by: INTERNAL MEDICINE

## 2022-12-20 PROCEDURE — 1126F AMNT PAIN NOTED NONE PRSNT: CPT | Mod: CPTII,S$GLB,, | Performed by: INTERNAL MEDICINE

## 2022-12-20 PROCEDURE — 3008F BODY MASS INDEX DOCD: CPT | Mod: CPTII,S$GLB,, | Performed by: INTERNAL MEDICINE

## 2022-12-20 PROCEDURE — 4010F ACE/ARB THERAPY RXD/TAKEN: CPT | Mod: CPTII,S$GLB,, | Performed by: INTERNAL MEDICINE

## 2022-12-20 PROCEDURE — 99215 OFFICE O/P EST HI 40 MIN: CPT | Mod: S$GLB,,, | Performed by: INTERNAL MEDICINE

## 2022-12-20 PROCEDURE — 1159F MED LIST DOCD IN RCRD: CPT | Mod: CPTII,S$GLB,, | Performed by: INTERNAL MEDICINE

## 2022-12-20 PROCEDURE — 1159F PR MEDICATION LIST DOCUMENTED IN MEDICAL RECORD: ICD-10-PCS | Mod: CPTII,S$GLB,, | Performed by: INTERNAL MEDICINE

## 2022-12-20 PROCEDURE — 3075F SYST BP GE 130 - 139MM HG: CPT | Mod: CPTII,S$GLB,, | Performed by: INTERNAL MEDICINE

## 2022-12-20 PROCEDURE — 99215 PR OFFICE/OUTPT VISIT, EST, LEVL V, 40-54 MIN: ICD-10-PCS | Mod: S$GLB,,, | Performed by: INTERNAL MEDICINE

## 2022-12-20 PROCEDURE — 1126F PR PAIN SEVERITY QUANTIFIED, NO PAIN PRESENT: ICD-10-PCS | Mod: CPTII,S$GLB,, | Performed by: INTERNAL MEDICINE

## 2022-12-20 PROCEDURE — 3078F DIAST BP <80 MM HG: CPT | Mod: CPTII,S$GLB,, | Performed by: INTERNAL MEDICINE

## 2022-12-20 PROCEDURE — 99499 UNLISTED E&M SERVICE: CPT | Mod: S$GLB,,, | Performed by: STUDENT IN AN ORGANIZED HEALTH CARE EDUCATION/TRAINING PROGRAM

## 2022-12-20 PROCEDURE — 1101F PT FALLS ASSESS-DOCD LE1/YR: CPT | Mod: CPTII,S$GLB,, | Performed by: INTERNAL MEDICINE

## 2022-12-20 RX ORDER — TRAMADOL HYDROCHLORIDE 50 MG/1
50 TABLET ORAL EVERY 12 HOURS PRN
Qty: 30 EACH | Refills: 0 | Status: SHIPPED | OUTPATIENT
Start: 2022-12-20 | End: 2023-01-31

## 2022-12-20 NOTE — PROGRESS NOTES
CONSULT NOTE    Subjective:       Patient ID: Bhavna Landry is a 67 y.o. female.  MRN: [id number]  : 1955    Chief Complaint: Follow-up (3 month f/u)    pT1b N0 (i+)  grade 2 ER + RI - HER2 - IDC of the right breast.    History of Present Illness:   Bhavna Landry is a 67 y.o. female who is referred for right breast IDC.     She presented for screening mammogram on 2022 . This identified an asymmetry in the right breast. Follow-up mammogram and ultrasound on 2022 showed a 1 x 0.9 x 0.8 cm mass at the 9 o'clock position of the right breast 3 cm from the nipple. A ultrasound guided biopsy was performed on 2022 with pathology revealing invasive ductal carcinoma of the breast.     She saw Dr. Dhillon and underwent right mastectomy and sentinel node exam.  It showed IDC 8 mm, grade 2, ER strongly positive, RI negative, HER2/jayla 1+, Ki-67 12%.  There was also DCIS, multiple foci up to 8 mm, spanning a distance of 20 mm.  Nuclear grade 2. Two sentinel nodes removed, 1 had isolated tumor cells.    Oncotype DX RS 28; Chemo benefit >15%     She has declined adjuvant chemotherapy.     Started Anastrozole  22    Interim history:   She presents today for regular follow-up. Since last visit saw orthopedic who recommended back specialists for lumbar spinal pain, but does not want surgical intervention. Controls pain with apap, which provides moderate relief. Tolerating anastrazole well, taking daily. No hot flashes but does have worsening arthralgias.     Oncology History:  1. Breast, right, mastectomy:   - Invasive ductal carcinoma       - Greatest dimension:  8 mm       - Jonathan-Alaniz modified SBR score 3 + 2 + 1 = 6 of 9       - Histologic grade 2 of 3       - Mitotic index = 0.2 (average mitoses per high power field) (low)       - Resection margin free of invasive carcinoma           - Invasive carcinoma present 9 mm to closest posterior margin, 26 mm  "  to inferior margin, and 45 mm to superior margin   - Biopsy clip present   - No lymphovascular invasion is identified   - Ductal carcinoma in situ       - Foci up to 8 mm in greatest dimension spanning a distance of   approximately 20 mm       - Nuclear grade ranges from 2 to 3 of 3       - Solid and cribriform patterns       - Central comedonecrosis present:  Approximately 80%       - Resection margin free of DCIS           - DCIS present present 9 mm to closest posterior margin, 26 mm to   inferior margin, and 45 mm to superior margin   - Background breast contains usual ductal hyperplasia, apocrine metaplasia,   microcysts and macrocysts, ectatic ducts, and stromal fibrosis   - Microcalcifications, calcium phosphate type, associated with DCIS and   focally with medial calcific arterial stenosis (Monckeberg's sclerosis)   - See CAP Tumor Synoptic   2. [place] lymph node, right axillary, "#1 hot and blue count 2004,"   excision:   - 1 lymph node, isolated tumor cells present   3. [place] lymph node, right axillary, "#2 hot and blue count 1700,"   excision:   - 1 lymph node, negative for metastatic carcinoma   CAP Tumor Synoptic:   Procedure:  Total mastectomy   Specimen laterality:  Right   Tumor site:  9 o'clock   Histologic type:  Invasive carcinoma of no special type (ductal)   Histologic grade ([place] histologic score):  [place] score 3 + 2 + 1 =   6 of 9   Glandular (acinar) / tubular differentiation:  Score 3   Nuclear pleomorphism:  Score 2   Mitotic grade:  Score 1   Overall grade:  Grade 2   Tumor size:  8 mm   Tumor focality:  Single focus of invasive carcinoma   Ductal carcinoma in situ (DCIS):  Present (Negative for extensive intraductal   component)       Size (extent) of DCIS:  Foci up to 8 mm in greatest dimension spanning a   distance of approximately 20 mm       Number blocks with DCIS:  4       Number blocks examined:  13       Architectural patterns:  [place], cribriform, solid       " Nuclear grade:  Ranges from grade 2 (intermediate) to grade 3 (high)   Lobular carcinoma in situ (LCIS):  Not identified   Tumor extent:  Not applicable   Lymphovascular invasion:  Not identified   Dermal lymphovascular invasion:  Not identified   Microcalcifications:  Present in DCIS and nonneoplastic tissue   Treatment effect in the breast:  No known pre-surgical therapy   Treatment effect in the lymph nodes:  Not applicable   Margin status for invasive carcinoma:  All margins negative for invasive   carcinoma       Distance from invasive carcinoma closest posterior margin:  9 mm       Distance from invasive carcinoma to inferior margin:  26 mm       Distance from invasive carcinoma to superior margin:  45 mm   Margin status for DCIS:  All margins negative for DCIS       Distant from DCIS to closest posterior margin:  9 mm       Distance from DCIS to inferior margin:  26 mm       Distance from DCIS to superior margin:  45 mm   Regional lymph node status:  Tumor present in regional lymph nodes   Number of lymph nodes with macrometastases:  0   Number of lymph nodes with micrometastases:  0   Number of lymph nodes with isolated tumor cells:  1   Size of largest jose l metastatic deposit:  0.13 mm   Extranodal extension:  Not identified   Total number of lymph nodes examined (sentinel and nonsentinel):  2   Number of sentinel nodes examined:  2   Distant sites involved:  Not applicable   TNM descriptors:  Not applicable   pT category:  pT1b   Regional lymph nodes modifier:  (sn)   pN category:  (sn) pN0 (i+)   pM category:  Not applicable - pM cannot be determined from the submitted   specimens   Special studies:  Biomarkers, assessed by immunohistochemistry on prior right   breast biopsy (S-) with following reported results:       - Estrogen Receptor (ER):  Positive (100%; strong intensity)       - Progesterone Receptor (FL):  Negative (<1%)       - HER2:  Negative (1+)       - Ki-67 proliferation index:   12% (low)       Oncology History   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, estrogen receptor positive   4/13/2022 Initial Diagnosis    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, estrogen receptor positive     4/13/2022 Cancer Staged    Staging form: Breast, AJCC 8th Edition  - Clinical stage from 4/13/2022: Stage IA (cT1b, cN0, cM0, G2, ER+, VA-, HER2-)       7/19/2022 - 7/19/2022 Chemotherapy    Treatment Summary   Plan Name: OP BREAST TC - DOCETAXEL CYCLOPHOSPHAMIDE Q3W  Treatment Goal: Curative  Status: Inactive  Start Date:   End Date:   Provider: Idania Martins MD  Chemotherapy: cyclophosphamide 600 mg/m2 = 880 mg in sodium chloride 0.9% 250 mL chemo infusion, 600 mg/m2, Intravenous, Clinic/HOD 1 time, 0 of 4 cycles  DOCEtaxeL (TAXOTERE) 75 mg/m2 = 110 mg in sodium chloride 0.9% 250 mL chemo infusion, 75 mg/m2, Intravenous, Clinic/HOD 1 time, 0 of 4 cycles           History:  Past Medical History:   Diagnosis Date    Allergy     Amblyopia     Anemia     Anticoagulant long-term use     Arthritis 02/02/1992    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, estrogen receptor positive 4/13/2022    Cataract     Depression     Dry eyes     Dry mouth     Duane's syndrome of right eye     Early dry stage nonexudative age-related macular degeneration of both eyes 9/20/2022    Fibromyalgia 4/17/2014    Fibromyalgia     Fractured hip     RIGHT HIP    GERD (gastroesophageal reflux disease)     History of psychiatric hospitalization     Hyperlipidemia 02/02/1992    Hypertension     Hypocalcemia     Hyponatremia     Kidney stone     Migraine headache     Osteoporosis     Psoriatic arthritis 02/02/1992    Right knee pain     post knee replacement surgery (possible rejectiion of metal)    RLS (restless legs syndrome)     Schizophrenia 02/02/1992    stable on meds    Squamous cell carcinoma of skin     Urinary tract infection       Past Surgical History:   Procedure  Laterality Date    brow ptosis repair Right 2019    Surgeon: Dr. Karla Henson    CATARACT EXTRACTION       SECTION      COLONOSCOPY N/A 2016    Procedure: COLONOSCOPY;  Surgeon: ANDRIA Connelly MD;  [facility]  Service: Endoscopy;  Laterality: N/A;    COLONOSCOPY N/A 2022    Procedure: COLONOSCOPY;  Surgeon: Mikey Walters MD;  Location: [facility] END[facility];  Service: Endoscopy;  Laterality: N/A;    cyst removed from right sinus  1982    HYSTERECTOMY      VAGINAL HYSTERECTOMY WITHOUT BSO - ENDOMETRIOSIS    INJECTION FOR SENTINEL NODE IDENTIFICATION Right 2022    Procedure: INJECTION, FOR SENTINEL NODE IDENTIFICATION-Right;  Surgeon: NEAL Dhillon MD;  Location[facility];  Service: General;  Laterality: Right;    INJECTION OF ANESTHETIC AGENT AROUND MEDIAL BRANCH NERVES INNERVATING LUMBAR FACET JOINT N/A 2019    Procedure: Lumbo-sacral Block, DR5 and Lateral Branches of S1,S2, S3;  Surgeon: Michelle Pineda Jr., MD;  Location: [facility] PAIN [facility];  Service: Pain Management;  Laterality: N/A;  Pt takes  and states she holds ASA on her own whenever she has procedures.  Instructed to hold x 3 days prior to procedure.      INJECTION OF ANESTHETIC AGENT INTO SACROILIAC JOINT Right 2018    Procedure: BLOCK, SACROILIAC JOINT-Right- ORAL SEDATION;  Surgeon: Michelle Pineda Jr., MD;  Location: [facility];  Service: Pain Management;  Laterality: Right;    INJECTION OF ANESTHETIC AGENT INTO SACROILIAC JOINT Bilateral 2018    Procedure: BLOCK, SACROILIAC JOINT-BILATERAL;  Surgeon: Michelle Pineda Jr., MD;  Location: [facility];  Service: Pain Management;  Laterality: Bilateral;  Please keep at 10:00 due to trasnsportation    INJECTION OF ANESTHETIC AGENT INTO SACROILIAC JOINT Bilateral 2019    Procedure: Bilateral Sacroiliac Joint Injection - Per Dr Pineda, not necessary to hold ASA.;  Surgeon: Michelle Pineda Jr., MD;  Location: [facility];   Service: Pain Management;  Laterality: Bilateral;    INTRAOCULAR PROSTHESES INSERTION Right 8/1/2019    Procedure: INSERTION, IOL PROSTHESIS;  Surgeon: Karen Song MD;  Location: [address];  Service: Ophthalmology;  Laterality: Right;    INTRAOCULAR PROSTHESES INSERTION Left 9/26/2019    Procedure: INSERTION, IOL PROSTHESIS;  Surgeon: Karen Song MD;  Location: [address];  Service: Ophthalmology;  Laterality: Left;    JOINT REPLACEMENT Right     knee    KNEE SURGERY      MASTECTOMY Right 5/9/2022    Procedure: MASTECTOMY-Right;  Surgeon: NEAL Dhillon MD;  Location: [facility];  Service: General;  Laterality: Right;  2.5 HOURS    PHACOEMULSIFICATION OF CATARACT Right 8/1/2019    Procedure: PHACOEMULSIFICATION, CATARACT;  Surgeon: Karen Song MD;  Location: [address];  Service: Ophthalmology;  Laterality: Right;    PHACOEMULSIFICATION OF CATARACT Left 9/26/2019    Procedure: PHACOEMULSIFICATION, CATARACT;  Surgeon: Karen Song MD;  Location: [address];  Service: Ophthalmology;  Laterality: Left;    RADIOFREQUENCY THERMOCOAGULATION Right 5/7/2019    Procedure: RADIOFREQUENCY THERMAL COAGULATION RIGHT DORSAL RAMUS 5 AND LATERAL BRANCH OF S1, S2 AND S3;  Surgeon: Michelle Pineda Jr., MD;  Location: [facility];  Service: Pain Management;  Laterality: Right;    RADIOFREQUENCY THERMOCOAGULATION Left 5/14/2019    Procedure: RADIOFREQUENCY THERMAL COAGULATION LEFT DORSAL RAMUS 5 AND LATERAL BRANCH OF S1,S2 AND S3;  Surgeon: Michelle Pineda Jr., MD;  Location: [facility];  Service: Pain Management;  Laterality: Left;    RADIOFREQUENCY THERMOCOAGULATION Right 10/22/2019    Procedure: RADIOFREQUENCY THERMAL COAGULATION---DARSAL RAMUS 5 and LATERAL S1,S2,and S3 Right;  Surgeon: Michelle Pineda Jr., MD;  Location: [facility];  Service: Pain Management;  Laterality: Right;  patient to sign consent DOS    RADIOFREQUENCY THERMOCOAGULATION Left 10/29/2019    Procedure:  RADIOFREQUENCY THERMAL COAGULATION - LEFT - DR5, S1,S2, AND S3;  Surgeon: Michelle Pineda Jr., MD;  Location: [facility];  Service: Pain Management;  Laterality: Left;    RADIOFREQUENCY THERMOCOAGULATION Left 5/26/2020    Procedure: RADIOFREQUENCY THERMAL COAGULATION--Left DR5+ lateral branches of S1, S2, S3;  Surgeon: Michelle Pineda Jr., MD;  Location: [facility];  Service: Pain Management;  Laterality: Left;    RADIOFREQUENCY THERMOCOAGULATION Right 6/2/2020    Procedure: RADIOFREQUENCY THERMAL COAGULATION--Right DR5+ lateral branches of S1, S2, S3;  Surgeon: Michelle Pineda Jr., MD;  Location: [facility] [place];  Service: Pain Management;  Laterality: Right;    SENTINEL LYMPH NODE BIOPSY Right 5/9/2022    Procedure: BIOPSY, LYMPH NODE, SENTINEL-Right;  Surgeon: NEAL Dhillon MD;  Location: [facility];  Service: General;  Laterality: Right;    Surgery on right knee  07/09/1982    tumor removed from back left side upper shoulder  03/17/2006     Family History   Problem Relation Age of Onset    Colon cancer Mother     Psoriasis Mother     Cancer Mother         colon    Depression Mother     Cancer Father         lymphoma    Stroke Sister     Diabetes Brother     Arthritis Brother     Heart disease Brother         cad    No Known Problems Daughter     Hypertension Neg Hx     Suicide Neg Hx     Amblyopia Neg Hx     Blindness Neg Hx     Cataracts Neg Hx     Glaucoma Neg Hx     Macular degeneration Neg Hx     Retinal detachment Neg Hx     Strabismus Neg Hx       Social History     Tobacco Use    Smoking status: Every Day     Packs/day: 0.25     Years: 10.00     Pack years: 2.50     Types: Cigarettes     Passive exposure: Never    Smokeless tobacco: Former    Tobacco comments:     about 5 cig a day   Substance and Sexual Activity    Alcohol use: No    Drug use: No    Sexual activity: Not Currently     Partners: Male     Birth control/protection: Post-menopausal        ROS:   Review of Systems   Constitutional:   "Negative for fever, malaise/fatigue and weight loss.   HENT:  Negative for congestion, ear pain, nosebleeds and sore throat.    Eyes:  Negative for blurred vision, double vision and photophobia.   Respiratory:  Negative for cough, hemoptysis, sputum production, shortness of breath, wheezing and stridor.    Cardiovascular:  Negative for chest pain, palpitations, orthopnea and leg swelling.   Gastrointestinal:  Negative for abdominal pain, blood in stool, constipation, diarrhea, heartburn, melena, nausea and vomiting.   Genitourinary:  Negative for dysuria and hematuria.   Musculoskeletal:  Positive for back pain and joint pain (left hip with recent exacerbation). Negative for myalgias and neck pain.   Skin:  Negative for itching and rash.   Neurological:  Negative for dizziness, focal weakness, seizures, weakness and headaches.   Endo/Heme/Allergies:  Negative for polydipsia. Does not bruise/bleed easily.   Psychiatric/Behavioral:  Positive for depression. Negative for memory loss. The patient is nervous/anxious. The patient does not have insomnia.       Objective:     Vitals:    12/20/22 1105   BP: (!) 130/31   Pulse: 70   Temp: 97.9 °F (36.6 °C)   SpO2: 99%   Weight: 46.9 kg (103 lb 6.3 oz)   Height: 5' 4" (1.626 m)   PainSc: 0-No pain           Physical Examination:   Physical Exam  Vitals and nursing note reviewed.   Constitutional:       General: She is not in acute distress.     Appearance: She is not diaphoretic.   HENT:      Head: Normocephalic.      Mouth/Throat:      Pharynx: No oropharyngeal exudate.   Eyes:      General: No scleral icterus.     Conjunctiva/sclera: Conjunctivae normal.   Neck:      Thyroid: No thyromegaly.   Cardiovascular:      Rate and Rhythm: Normal rate and regular rhythm.      Heart sounds: Normal heart sounds. No murmur heard.  Pulmonary:      Effort: Pulmonary effort is normal. No respiratory distress.      Breath sounds: No stridor. No wheezing or rhonchi.   Chest:      Comments: " Breast exam recently done, defer today  Abdominal:      General: Bowel sounds are normal. There is no distension.      Palpations: Abdomen is soft. There is no mass.      Tenderness: There is no abdominal tenderness. There is no rebound.   Musculoskeletal:         General: Tenderness (left hip) present. No deformity. Normal range of motion.      Cervical back: Neck supple.   Lymphadenopathy:      Cervical: No cervical adenopathy.   Skin:     General: Skin is warm and dry.      Findings: No erythema or rash.   Neurological:      Mental Status: She is alert and oriented to person, place, and time.      Cranial Nerves: No cranial nerve deficit.      Coordination: Coordination normal.      Gait: Gait is intact.   Psychiatric:         Mood and Affect: Affect normal.         Cognition and Memory: Memory normal.         Judgment: Judgment normal.        Diagnostic Tests:  Significant Imaging: I have reviewed and interpreted all pertinent imaging results/findings.    Laboratory Data:  All pertinent labs have been reviewed.    Labs:   Lab Results   Component Value Date    WBC 8.95 11/07/2022    HGB 12.3 11/07/2022    HCT 37.9 11/07/2022    MCV 89 11/07/2022     11/07/2022       Assessment/Plan:   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, estrogen receptor positive  pT1b N0 (i+)  grade 2 ER + IL - HER2 - IDC of the right breast.    She is status post mastectomy and sentinel node exam for IDC as well as DCIS of the right breast.     Oncotype returned high risk, which confers a benefit ot the addition of chemotherapy to AI.   RSclin showed an 8% risk of recurrence with 4% absolute benefit of chemotherapy. She has declined chemotherapy.     Tolerating anastrozole relatively well. Continue at this time. See below.   Continue Zometa q 6 months for bone health and possible risk reduction for breast cancer, next due 5/2023.     Continue active surveillance. Will see her back in 6 weeks for her scheduled  visit. Left mammogram is due in March.     Osteopenia of multiple sites  Diagnosed with osteopenia/osteoporosis given hip fracture in 2020. Follows up with Dr. Cornelius and is on zoledronic acid, most recent does in March 17, 2022.   Continues on MTX weekly  Follow up bone density scan is normal.   Upcoming visit with Dr. Cornelius 1/6, emphasized importance.     Hip pain   Recent exacerbation of left hip pain. Imaging revealed no abnormality of hip, has degenerative changes L3-L5. Declines surgical evaluation at this time.     Aromatase inhibitor arthralgias   Continue supportive care. She declined acupuncture referral previously.     Colon Polyps  Recent colonoscopy 12/09, path pending    MDM includes  :    - Acute or chronic illness or injury that poses a threat to life or bodily function  - Independent review and explanation of 3+ results from unique tests  - Discussion of management and ordering 3+ unique tests  - Extensive discussion of treatment and management  - Prescription drug management  - Drug therapy requiring intensive monitoring for toxicity        ECOG SCORE               Discussion:   Patient discussed with attending physician, Dr. Chari Pinzon, PGY-VI  Hematology/Oncology Fellow      Med Onc Chart Routing      Follow up with physician 3 months. 3/22/23 same day as Dr. Dhillon   Follow up with [facility]    Infusion scheduling note    Injection scheduling note    Labs CBC and CMP   Lab interval:  labs prior to clinic visit   Imaging    Pharmacy appointment    Other referrals

## 2022-12-21 DIAGNOSIS — L40.50 PSA (PSORIATIC ARTHRITIS): Primary | ICD-10-CM

## 2022-12-23 ENCOUNTER — LAB VISIT (OUTPATIENT)
Dept: LAB | Facility: HOSPITAL | Age: 67
End: 2022-12-23
Attending: INTERNAL MEDICINE
Payer: MEDICARE

## 2022-12-23 ENCOUNTER — OFFICE VISIT (OUTPATIENT)
Dept: DERMATOLOGY | Facility: CLINIC | Age: 67
End: 2022-12-23
Payer: MEDICARE

## 2022-12-23 VITALS — WEIGHT: 103 LBS | BODY MASS INDEX: 17.68 KG/M2

## 2022-12-23 DIAGNOSIS — L40.50 PSA (PSORIATIC ARTHRITIS): ICD-10-CM

## 2022-12-23 DIAGNOSIS — L57.0 MULTIPLE ACTINIC KERATOSES: Primary | ICD-10-CM

## 2022-12-23 DIAGNOSIS — Z85.828 HISTORY OF SKIN CANCER: ICD-10-CM

## 2022-12-23 DIAGNOSIS — L81.4 LENTIGINES: ICD-10-CM

## 2022-12-23 DIAGNOSIS — T14.8XXA ABRASION: ICD-10-CM

## 2022-12-23 LAB
ALBUMIN SERPL BCP-MCNC: 3.4 G/DL (ref 3.5–5.2)
ALP SERPL-CCNC: 72 U/L (ref 55–135)
ALT SERPL W/O P-5'-P-CCNC: 16 U/L (ref 10–44)
ANION GAP SERPL CALC-SCNC: 8 MMOL/L (ref 8–16)
AST SERPL-CCNC: 22 U/L (ref 10–40)
BASOPHILS # BLD AUTO: 0.04 K/UL (ref 0–0.2)
BASOPHILS NFR BLD: 0.6 % (ref 0–1.9)
BILIRUB SERPL-MCNC: 0.1 MG/DL (ref 0.1–1)
BUN SERPL-MCNC: 10 MG/DL (ref 8–23)
CALCIUM SERPL-MCNC: 9.8 MG/DL (ref 8.7–10.5)
CHLORIDE SERPL-SCNC: 100 MMOL/L (ref 95–110)
CO2 SERPL-SCNC: 31 MMOL/L (ref 23–29)
CREAT SERPL-MCNC: 0.8 MG/DL (ref 0.5–1.4)
CRP SERPL-MCNC: 0.5 MG/L (ref 0–8.2)
DIFFERENTIAL METHOD: ABNORMAL
EOSINOPHIL # BLD AUTO: 0.2 K/UL (ref 0–0.5)
EOSINOPHIL NFR BLD: 2.4 % (ref 0–8)
ERYTHROCYTE [DISTWIDTH] IN BLOOD BY AUTOMATED COUNT: 16.7 % (ref 11.5–14.5)
ERYTHROCYTE [SEDIMENTATION RATE] IN BLOOD BY PHOTOMETRIC METHOD: 10 MM/HR (ref 0–36)
EST. GFR  (NO RACE VARIABLE): >60 ML/MIN/1.73 M^2
GLUCOSE SERPL-MCNC: 100 MG/DL (ref 70–110)
HCT VFR BLD AUTO: 36.2 % (ref 37–48.5)
HGB BLD-MCNC: 11.4 G/DL (ref 12–16)
IMM GRANULOCYTES # BLD AUTO: 0.01 K/UL (ref 0–0.04)
IMM GRANULOCYTES NFR BLD AUTO: 0.1 % (ref 0–0.5)
LYMPHOCYTES # BLD AUTO: 1.6 K/UL (ref 1–4.8)
LYMPHOCYTES NFR BLD: 23.6 % (ref 18–48)
MCH RBC QN AUTO: 27.7 PG (ref 27–31)
MCHC RBC AUTO-ENTMCNC: 31.5 G/DL (ref 32–36)
MCV RBC AUTO: 88 FL (ref 82–98)
MONOCYTES # BLD AUTO: 0.8 K/UL (ref 0.3–1)
MONOCYTES NFR BLD: 10.9 % (ref 4–15)
NEUTROPHILS # BLD AUTO: 4.3 K/UL (ref 1.8–7.7)
NEUTROPHILS NFR BLD: 62.4 % (ref 38–73)
NRBC BLD-RTO: 0 /100 WBC
PLATELET # BLD AUTO: 425 K/UL (ref 150–450)
PMV BLD AUTO: 8.9 FL (ref 9.2–12.9)
POTASSIUM SERPL-SCNC: 4.8 MMOL/L (ref 3.5–5.1)
PROT SERPL-MCNC: 5.8 G/DL (ref 6–8.4)
RBC # BLD AUTO: 4.12 M/UL (ref 4–5.4)
SODIUM SERPL-SCNC: 139 MMOL/L (ref 136–145)
WBC # BLD AUTO: 6.96 K/UL (ref 3.9–12.7)

## 2022-12-23 PROCEDURE — 3008F BODY MASS INDEX DOCD: CPT | Mod: CPTII,S$GLB,, | Performed by: DERMATOLOGY

## 2022-12-23 PROCEDURE — 36415 COLL VENOUS BLD VENIPUNCTURE: CPT | Mod: PO | Performed by: INTERNAL MEDICINE

## 2022-12-23 PROCEDURE — 1126F AMNT PAIN NOTED NONE PRSNT: CPT | Mod: CPTII,S$GLB,, | Performed by: DERMATOLOGY

## 2022-12-23 PROCEDURE — 99214 OFFICE O/P EST MOD 30 MIN: CPT | Mod: 25,S$GLB,, | Performed by: DERMATOLOGY

## 2022-12-23 PROCEDURE — 1160F PR REVIEW ALL MEDS BY PRESCRIBER/CLIN PHARMACIST DOCUMENTED: ICD-10-PCS | Mod: CPTII,S$GLB,, | Performed by: DERMATOLOGY

## 2022-12-23 PROCEDURE — 86140 C-REACTIVE PROTEIN: CPT | Performed by: INTERNAL MEDICINE

## 2022-12-23 PROCEDURE — 99214 PR OFFICE/OUTPT VISIT, EST, LEVL IV, 30-39 MIN: ICD-10-PCS | Mod: 25,S$GLB,, | Performed by: DERMATOLOGY

## 2022-12-23 PROCEDURE — 1159F MED LIST DOCD IN RCRD: CPT | Mod: CPTII,S$GLB,, | Performed by: DERMATOLOGY

## 2022-12-23 PROCEDURE — 80053 COMPREHEN METABOLIC PANEL: CPT | Performed by: INTERNAL MEDICINE

## 2022-12-23 PROCEDURE — 1159F PR MEDICATION LIST DOCUMENTED IN MEDICAL RECORD: ICD-10-PCS | Mod: CPTII,S$GLB,, | Performed by: DERMATOLOGY

## 2022-12-23 PROCEDURE — 85025 COMPLETE CBC W/AUTO DIFF WBC: CPT | Performed by: INTERNAL MEDICINE

## 2022-12-23 PROCEDURE — 4010F PR ACE/ARB THEARPY RXD/TAKEN: ICD-10-PCS | Mod: CPTII,S$GLB,, | Performed by: DERMATOLOGY

## 2022-12-23 PROCEDURE — 99999 PR PBB SHADOW E&M-EST. PATIENT-LVL II: ICD-10-PCS | Mod: PBBFAC,,, | Performed by: DERMATOLOGY

## 2022-12-23 PROCEDURE — 85652 RBC SED RATE AUTOMATED: CPT | Performed by: INTERNAL MEDICINE

## 2022-12-23 PROCEDURE — 4010F ACE/ARB THERAPY RXD/TAKEN: CPT | Mod: CPTII,S$GLB,, | Performed by: DERMATOLOGY

## 2022-12-23 PROCEDURE — 1160F RVW MEDS BY RX/DR IN RCRD: CPT | Mod: CPTII,S$GLB,, | Performed by: DERMATOLOGY

## 2022-12-23 PROCEDURE — 99999 PR PBB SHADOW E&M-EST. PATIENT-LVL II: CPT | Mod: PBBFAC,,, | Performed by: DERMATOLOGY

## 2022-12-23 PROCEDURE — 1126F PR PAIN SEVERITY QUANTIFIED, NO PAIN PRESENT: ICD-10-PCS | Mod: CPTII,S$GLB,, | Performed by: DERMATOLOGY

## 2022-12-23 PROCEDURE — 3008F PR BODY MASS INDEX (BMI) DOCUMENTED: ICD-10-PCS | Mod: CPTII,S$GLB,, | Performed by: DERMATOLOGY

## 2022-12-23 NOTE — PROGRESS NOTES
Subjective:       Patient ID:  Bhavna Landry is a 67 y.o. female who presents for   Chief Complaint   Patient presents with    Follow-up     Would like skin check, has new spots on right arm and left eyebrow do not bleed.  Has spots on hands where were pinched in door.     Follow-up - Initial  Affected locations: face  Signs / symptoms: asymptomatic  Aggravated by: nothing    Review of Systems   Constitutional:  Negative for fever, chills, weight loss, weight gain, fatigue and malaise.   Skin:  Negative for daily sunscreen use and activity-related sunscreen use.   Psychiatric/Behavioral:  Negative for high stress.    Hematologic/Lymphatic: Bruises/bleeds easily.      Objective:    Physical Exam   Constitutional: She appears well-developed and well-nourished. No distress.   Neurological: She is alert and oriented to person, place, and time. She is not disoriented.   Psychiatric: She has a normal mood and affect.   Skin:   Areas Examined (abnormalities noted in diagram):   Head / Face Inspection Performed  Neck Inspection Performed  RUE Inspected  LUE Inspection Performed  Nails and Digits Inspection Performed                     Diagram Legend     Erythematous scaling macule/papule c/w actinic keratosis       Vascular papule c/w angioma      Pigmented verrucoid papule/plaque c/w seborrheic keratosis      Yellow umbilicated papule c/w sebaceous hyperplasia      Irregularly shaped tan macule c/w lentigo     1-2 mm smooth white papules consistent with Milia      Movable subcutaneous cyst with punctum c/w epidermal inclusion cyst      Subcutaneous movable cyst c/w pilar cyst      Firm pink to brown papule c/w dermatofibroma      Pedunculated fleshy papule(s) c/w skin tag(s)      Evenly pigmented macule c/w junctional nevus     Mildly variegated pigmented, slightly irregular-bordered macule c/w mildly atypical nevus      Flesh colored to evenly pigmented papule c/w intradermal nevus       Pink pearly papule/plaque c/w  "basal cell carcinoma      Erythematous hyperkeratotic cursted plaque c/w SCC      Surgical scar with no sign of skin cancer recurrence      Open and closed comedones      Inflammatory papules and pustules      Verrucoid papule consistent consistent with wart     Erythematous eczematous patches and plaques     Dystrophic onycholytic nail with subungual debris c/w onychomycosis     Umbilicated papule    Erythematous-base heme-crusted tan verrucoid plaque consistent with inflamed seborrheic keratosis     Erythematous Silvery Scaling Plaque c/w Psoriasis     See annotation      Assessment / Plan:        Multiple actinic keratoses   Cryosurgery Procedure Note    Verbal consent from the patient is obtained and the patient is aware of the precancerous quality and need for treatment of these lesions. Liquid nitrogen cryosurgery is applied to the 2 actinic keratoses, as detailed in the physical exam, to produce a freeze injury.      Lentigines  The "ABCD" rules to observe pigmented lesions were reviewed.      History of skin cancers  Area(s) of previous NMSC evaluated with no signs of recurrence.   No lesions suspicious for malignancy noted.    Recommend daily sun protection/avoidance and use of at least SPF 30, broad spectrum sunscreen (OTC drug).       Abrasions  Cetaphil healing ointment with band aids, change daily             Follow up in about 6 months (around 6/23/2023).  "

## 2022-12-24 ENCOUNTER — PATIENT MESSAGE (OUTPATIENT)
Dept: RHEUMATOLOGY | Facility: CLINIC | Age: 67
End: 2022-12-24
Payer: MEDICARE

## 2022-12-24 ENCOUNTER — TELEPHONE (OUTPATIENT)
Dept: RHEUMATOLOGY | Facility: CLINIC | Age: 67
End: 2022-12-24
Payer: MEDICARE

## 2022-12-24 DIAGNOSIS — Z79.631 LONG TERM METHOTREXATE USER: ICD-10-CM

## 2022-12-24 DIAGNOSIS — D64.9 ANEMIA, UNSPECIFIED TYPE: Primary | ICD-10-CM

## 2022-12-24 DIAGNOSIS — L40.50 PSA (PSORIATIC ARTHRITIS): ICD-10-CM

## 2022-12-24 DIAGNOSIS — Z79.899 OTHER LONG TERM (CURRENT) DRUG THERAPY: ICD-10-CM

## 2022-12-25 ENCOUNTER — PATIENT MESSAGE (OUTPATIENT)
Dept: RHEUMATOLOGY | Facility: CLINIC | Age: 67
End: 2022-12-25
Payer: MEDICARE

## 2022-12-27 ENCOUNTER — TELEPHONE (OUTPATIENT)
Dept: INTERNAL MEDICINE | Facility: CLINIC | Age: 67
End: 2022-12-27
Payer: MEDICARE

## 2022-12-27 ENCOUNTER — LAB VISIT (OUTPATIENT)
Dept: LAB | Facility: HOSPITAL | Age: 67
End: 2022-12-27
Attending: INTERNAL MEDICINE
Payer: MEDICARE

## 2022-12-27 DIAGNOSIS — D50.9 IRON DEFICIENCY ANEMIA, UNSPECIFIED IRON DEFICIENCY ANEMIA TYPE: ICD-10-CM

## 2022-12-27 DIAGNOSIS — Z79.899 OTHER LONG TERM (CURRENT) DRUG THERAPY: ICD-10-CM

## 2022-12-27 DIAGNOSIS — D64.9 ANEMIA, UNSPECIFIED TYPE: ICD-10-CM

## 2022-12-27 DIAGNOSIS — Z79.631 LONG TERM METHOTREXATE USER: ICD-10-CM

## 2022-12-27 DIAGNOSIS — I10 ESSENTIAL HYPERTENSION: Primary | Chronic | ICD-10-CM

## 2022-12-27 DIAGNOSIS — L40.50 PSA (PSORIATIC ARTHRITIS): ICD-10-CM

## 2022-12-27 DIAGNOSIS — E78.5 HYPERLIPIDEMIA, UNSPECIFIED HYPERLIPIDEMIA TYPE: ICD-10-CM

## 2022-12-27 LAB
IRON SERPL-MCNC: 52 UG/DL (ref 30–160)
SATURATED IRON: 11 % (ref 20–50)
TOTAL IRON BINDING CAPACITY: 491 UG/DL (ref 250–450)
TRANSFERRIN SERPL-MCNC: 332 MG/DL (ref 200–375)

## 2022-12-27 PROCEDURE — 84238 ASSAY NONENDOCRINE RECEPTOR: CPT | Performed by: INTERNAL MEDICINE

## 2022-12-27 PROCEDURE — 84466 ASSAY OF TRANSFERRIN: CPT | Performed by: INTERNAL MEDICINE

## 2022-12-27 PROCEDURE — 36415 COLL VENOUS BLD VENIPUNCTURE: CPT | Mod: PO | Performed by: INTERNAL MEDICINE

## 2022-12-27 PROCEDURE — 82746 ASSAY OF FOLIC ACID SERUM: CPT | Performed by: INTERNAL MEDICINE

## 2022-12-27 PROCEDURE — 82607 VITAMIN B-12: CPT | Performed by: INTERNAL MEDICINE

## 2022-12-27 PROCEDURE — 82728 ASSAY OF FERRITIN: CPT | Performed by: INTERNAL MEDICINE

## 2022-12-27 NOTE — TELEPHONE ENCOUNTER
----- Message from Sameera Bravo sent at 12/27/2022  4:32 PM CST -----  Contact: [phone] Patient      Caller is requesting to schedule their Lab appointment prior to annual appointment.  Order is not listed in EPIC.  Please enter order and contact patient to schedule.        Preferred Date and Time of Labs: 05/30/2023 morning @ 7 am if fasting labs    Date of Annual Physical Appointment:06/06/2023    Where would they like the lab performed? [facility]    Would the patient rather a call back or a response via [facility]? Patient portal/ Mail reminder please

## 2022-12-28 ENCOUNTER — PATIENT MESSAGE (OUTPATIENT)
Dept: RHEUMATOLOGY | Facility: CLINIC | Age: 67
End: 2022-12-28
Payer: MEDICARE

## 2022-12-28 LAB
FERRITIN SERPL-MCNC: 11 NG/ML (ref 20–300)
FOLATE SERPL-MCNC: >40 NG/ML (ref 4–24)
VIT B12 SERPL-MCNC: 1638 PG/ML (ref 210–950)

## 2022-12-28 NOTE — PROGRESS NOTES
The folate and B12 levels are not reduced. The iron saturation and ferritin are both very low confirming iron deficiency as cause of your anemia. I see you just had colonoscopy biopsies pending Would f/u with Dr. Vargas your internists and Dr. Marcelino your Oncologist regarding the iron deficiency to see if any further evaluation is warranted. YEN

## 2022-12-29 ENCOUNTER — TELEPHONE (OUTPATIENT)
Dept: INTERNAL MEDICINE | Facility: CLINIC | Age: 67
End: 2022-12-29
Payer: MEDICARE

## 2022-12-29 LAB
FINAL PATHOLOGIC DIAGNOSIS: NORMAL
GROSS: NORMAL
Lab: NORMAL
STFR SERPL-MCNC: 5.9 MG/L (ref 1.8–4.6)

## 2022-12-29 NOTE — TELEPHONE ENCOUNTER
Outgoing call regarding [employer] PAP application. Pt stated that Dr. Cornelius took her off the Xeljanz and replaced with Different Medication that she fills at [facility]. Closing pt out.

## 2022-12-29 NOTE — TELEPHONE ENCOUNTER
----- Message from Niyah Rivera sent at 12/29/2022  4:27 PM CST -----  Pt would like know if Dr Vargas received the results of her colonoscopy.    Thanks

## 2022-12-29 NOTE — TELEPHONE ENCOUNTER
Called patient advised provider is out will forward message for review. Results are in please review.

## 2022-12-30 NOTE — TELEPHONE ENCOUNTER
Yes, I have  Pls add 930 on Jan 9 so we can discuss evaluation of iron deficiency.  In meantime, start otc iron - 1 tab a day.

## 2023-01-05 ENCOUNTER — PATIENT MESSAGE (OUTPATIENT)
Dept: RHEUMATOLOGY | Facility: CLINIC | Age: 68
End: 2023-01-05
Payer: MEDICARE

## 2023-01-09 ENCOUNTER — OFFICE VISIT (OUTPATIENT)
Dept: INTERNAL MEDICINE | Facility: CLINIC | Age: 68
End: 2023-01-09
Payer: MEDICARE

## 2023-01-09 VITALS
WEIGHT: 103.38 LBS | OXYGEN SATURATION: 100 % | BODY MASS INDEX: 17.65 KG/M2 | DIASTOLIC BLOOD PRESSURE: 74 MMHG | HEIGHT: 64 IN | SYSTOLIC BLOOD PRESSURE: 160 MMHG | HEART RATE: 82 BPM

## 2023-01-09 DIAGNOSIS — Z79.82 ASPIRIN LONG-TERM USE: ICD-10-CM

## 2023-01-09 DIAGNOSIS — D50.9 IRON DEFICIENCY ANEMIA, UNSPECIFIED IRON DEFICIENCY ANEMIA TYPE: Primary | ICD-10-CM

## 2023-01-09 DIAGNOSIS — E78.5 HYPERLIPIDEMIA, UNSPECIFIED HYPERLIPIDEMIA TYPE: ICD-10-CM

## 2023-01-09 DIAGNOSIS — I10 ESSENTIAL HYPERTENSION: Chronic | ICD-10-CM

## 2023-01-09 PROCEDURE — 99999 PR PBB SHADOW E&M-EST. PATIENT-LVL V: CPT | Mod: PBBFAC,,, | Performed by: INTERNAL MEDICINE

## 2023-01-09 PROCEDURE — 4010F ACE/ARB THERAPY RXD/TAKEN: CPT | Mod: CPTII,S$GLB,, | Performed by: INTERNAL MEDICINE

## 2023-01-09 PROCEDURE — 1126F AMNT PAIN NOTED NONE PRSNT: CPT | Mod: CPTII,S$GLB,, | Performed by: INTERNAL MEDICINE

## 2023-01-09 PROCEDURE — 3077F SYST BP >= 140 MM HG: CPT | Mod: CPTII,S$GLB,, | Performed by: INTERNAL MEDICINE

## 2023-01-09 PROCEDURE — 1159F MED LIST DOCD IN RCRD: CPT | Mod: CPTII,S$GLB,, | Performed by: INTERNAL MEDICINE

## 2023-01-09 PROCEDURE — 99999 PR PBB SHADOW E&M-EST. PATIENT-LVL V: ICD-10-PCS | Mod: PBBFAC,,, | Performed by: INTERNAL MEDICINE

## 2023-01-09 PROCEDURE — 3078F DIAST BP <80 MM HG: CPT | Mod: CPTII,S$GLB,, | Performed by: INTERNAL MEDICINE

## 2023-01-09 PROCEDURE — 3077F PR MOST RECENT SYSTOLIC BLOOD PRESSURE >= 140 MM HG: ICD-10-PCS | Mod: CPTII,S$GLB,, | Performed by: INTERNAL MEDICINE

## 2023-01-09 PROCEDURE — 3078F PR MOST RECENT DIASTOLIC BLOOD PRESSURE < 80 MM HG: ICD-10-PCS | Mod: CPTII,S$GLB,, | Performed by: INTERNAL MEDICINE

## 2023-01-09 PROCEDURE — 99214 PR OFFICE/OUTPT VISIT, EST, LEVL IV, 30-39 MIN: ICD-10-PCS | Mod: S$GLB,,, | Performed by: INTERNAL MEDICINE

## 2023-01-09 PROCEDURE — 3288F FALL RISK ASSESSMENT DOCD: CPT | Mod: CPTII,S$GLB,, | Performed by: INTERNAL MEDICINE

## 2023-01-09 PROCEDURE — 1101F PR PT FALLS ASSESS DOC 0-1 FALLS W/OUT INJ PAST YR: ICD-10-PCS | Mod: CPTII,S$GLB,, | Performed by: INTERNAL MEDICINE

## 2023-01-09 PROCEDURE — 99214 OFFICE O/P EST MOD 30 MIN: CPT | Mod: S$GLB,,, | Performed by: INTERNAL MEDICINE

## 2023-01-09 PROCEDURE — 3008F PR BODY MASS INDEX (BMI) DOCUMENTED: ICD-10-PCS | Mod: CPTII,S$GLB,, | Performed by: INTERNAL MEDICINE

## 2023-01-09 PROCEDURE — 1101F PT FALLS ASSESS-DOCD LE1/YR: CPT | Mod: CPTII,S$GLB,, | Performed by: INTERNAL MEDICINE

## 2023-01-09 PROCEDURE — 1126F PR PAIN SEVERITY QUANTIFIED, NO PAIN PRESENT: ICD-10-PCS | Mod: CPTII,S$GLB,, | Performed by: INTERNAL MEDICINE

## 2023-01-09 PROCEDURE — 3008F BODY MASS INDEX DOCD: CPT | Mod: CPTII,S$GLB,, | Performed by: INTERNAL MEDICINE

## 2023-01-09 PROCEDURE — 1160F RVW MEDS BY RX/DR IN RCRD: CPT | Mod: CPTII,S$GLB,, | Performed by: INTERNAL MEDICINE

## 2023-01-09 PROCEDURE — 1159F PR MEDICATION LIST DOCUMENTED IN MEDICAL RECORD: ICD-10-PCS | Mod: CPTII,S$GLB,, | Performed by: INTERNAL MEDICINE

## 2023-01-09 PROCEDURE — 1160F PR REVIEW ALL MEDS BY PRESCRIBER/CLIN PHARMACIST DOCUMENTED: ICD-10-PCS | Mod: CPTII,S$GLB,, | Performed by: INTERNAL MEDICINE

## 2023-01-09 PROCEDURE — 3288F PR FALLS RISK ASSESSMENT DOCUMENTED: ICD-10-PCS | Mod: CPTII,S$GLB,, | Performed by: INTERNAL MEDICINE

## 2023-01-09 PROCEDURE — 4010F PR ACE/ARB THEARPY RXD/TAKEN: ICD-10-PCS | Mod: CPTII,S$GLB,, | Performed by: INTERNAL MEDICINE

## 2023-01-09 RX ORDER — FERROUS SULFATE 325(65) MG
325 TABLET, DELAYED RELEASE (ENTERIC COATED) ORAL DAILY
Qty: 1 TABLET | Refills: 0
Start: 2023-01-09 | End: 2023-06-06

## 2023-01-09 RX ORDER — ATORVASTATIN CALCIUM 80 MG/1
80 TABLET, FILM COATED ORAL DAILY
Qty: 90 TABLET | Refills: 3 | Status: SHIPPED | OUTPATIENT
Start: 2023-01-09 | End: 2024-02-02 | Stop reason: SDUPTHER

## 2023-01-09 RX ORDER — LISINOPRIL 10 MG/1
10 TABLET ORAL DAILY
Qty: 90 TABLET | Refills: 3 | Status: SHIPPED | OUTPATIENT
Start: 2023-01-09 | End: 2023-04-27

## 2023-01-09 RX ORDER — CLONIDINE HYDROCHLORIDE 0.1 MG/1
TABLET ORAL
Qty: 30 TABLET | Refills: 1 | Status: SHIPPED | OUTPATIENT
Start: 2023-01-09

## 2023-01-09 NOTE — PROGRESS NOTES
Subjective:       Patient ID: Bhavna Landry is a 67 y.o. female.    Chief Complaint: lab discussion     HPI  Mildly anemic.  11.4, hct 36.2.   Ferritin 11, iron sat 11, tibc 491.  She is taking asa. She occas takes Tums.  Chronically constipated.  No n, v.  She is taking stool softener daily, which helps.  She has started iron.  She just had colonoscopy - several benign biopsies.  Sessile serrated lesion by biopsy.      She is due for lipids.      BP erratic.  She is taking lisinopril 5 mg and amlodipine 5 and catapress prn.      Wt fluctuates 101-108.    We discussed her chronic sinusitis and recent ENT eval.          Review of Systems   Constitutional:  Negative for fever and unexpected weight change.   HENT:  Negative for nasal congestion and postnasal drip.    Eyes:  Negative for pain, discharge and visual disturbance.   Respiratory:  Negative for cough, chest tightness, shortness of breath and wheezing.    Cardiovascular:  Negative for chest pain and leg swelling.   Gastrointestinal:  Negative for abdominal pain, constipation, diarrhea and nausea.   Genitourinary:  Negative for difficulty urinating, dysuria and hematuria.   Integumentary:  Negative for rash.   Neurological:  Negative for headaches.   Psychiatric/Behavioral:  Negative for dysphoric mood and sleep disturbance. The patient is not nervous/anxious.        Objective:      Physical Exam  Constitutional:       Appearance: Normal appearance. She is not ill-appearing or diaphoretic.   Neurological:      General: No focal deficit present.      Mental Status: She is oriented to person, place, and time.   Psychiatric:         Mood and Affect: Mood normal.         Behavior: Behavior normal.         Thought Content: Thought content normal.       162/72    Assessment:       Problem List Items Addressed This Visit       Essential hypertension (Chronic)    Hyperlipidemia    Relevant Medications    atorvastatin (LIPITOR) 80 MG tablet     Other Visit Diagnoses        Iron deficiency anemia, unspecified iron deficiency anemia type    -  Primary    Relevant Orders    Ambulatory referral/consult to Endo Procedure     Aspirin long-term use              At risk for UGI bleed  Plan:       Bhavna was seen today for lab discussion .    Diagnoses and all orders for this visit:    Iron deficiency anemia, unspecified iron deficiency anemia type  -     Ambulatory referral/consult to Endo Procedure ; Future    Hyperlipidemia, unspecified hyperlipidemia type  -     atorvastatin (LIPITOR) 80 MG tablet; Take 1 tablet (80 mg total) by mouth once daily.    Aspirin long-term use    Essential hypertension    Other orders  -     ferrous sulfate 325 (65 FE) MG EC tablet; Take 1 tablet (325 mg total) by mouth once daily.           Incr lisinopril to 10 mg daily  Dig HTn to monitor  Cont iron .  Check levels in march  EGD

## 2023-01-09 NOTE — PATIENT INSTRUCTIONS
Number to schedule colonoscopy, if you do not hear from them within next two weeks,  is [phone]       Increase Lisinopril   to 10 mg daily

## 2023-01-11 ENCOUNTER — TELEPHONE (OUTPATIENT)
Dept: ENDOSCOPY | Facility: HOSPITAL | Age: 68
End: 2023-01-11
Payer: MEDICARE

## 2023-01-11 ENCOUNTER — TELEPHONE (OUTPATIENT)
Dept: GASTROENTEROLOGY | Facility: CLINIC | Age: 68
End: 2023-01-11
Payer: MEDICARE

## 2023-01-11 DIAGNOSIS — D50.9 IRON DEFICIENCY ANEMIA, UNSPECIFIED IRON DEFICIENCY ANEMIA TYPE: Primary | ICD-10-CM

## 2023-01-11 NOTE — TELEPHONE ENCOUNTER
Dr. Vargas ordered a urgent EGD for patient with iron deficiency anemia.    We called patient about getting it set up however she wants it completed at [facility] and was given the number. Case request for [place] is in her chart.    Can someone reach out to her to setup up Urgent request from .    Thanks!

## 2023-01-11 NOTE — TELEPHONE ENCOUNTER
EGD Instructions    [facility] [address]  Clinic Office [phone]  Endoscopy Lab [phone]    You are scheduled for an EGD with Dr. Shea  on  2/7/23 at [facility] in [place].    Check in at the Hospital -1st floor, Information desk.   Call [phone] to reschedule.    You cannot have anything to eat or drink after Midnight. You can brush your teeth with a sip of water.     An adult friend/family member must come with you to drive you home.  You cannot drive, take a taxi, Uber/Lyft or bus to leave the Endoscopy Center alone.  If you do not have someone to drive you home, your test will be cancelled.     Please follow the directions of your doctor if you take any pills that thin your blood. If you take these meds: Aggrenox, Brilinta, Effient, Eliquis, Lovenox, Plavix, Pletal, Pradaxa, Ticilid, Xarelto or Coumadin, let the doctor's office know.    DON'T: On the morning of the test do not take insulin or pills for diabetes.     DO: On the morning of the test, do take any pills for blood pressure, heart, anti-rejection and or seizures with a small sip of water. Bring any inhalers with you.    A Covid test is required 72 hours before the procedure. The test is not needed with proof of vaccination > 2 weeks or previous Covid infection.    Leave all valuables and jewelry at home. You will be at the hospital for 2-4 hours.    Call the Endoscopy department at [phone] with any questions about your procedure.    Thank you for choosing [facility].

## 2023-01-11 NOTE — LETTER
[facility]  [address], [address]  [place] [address]  Phone: [phone]           EGD Instructions     [facility]  [address] [facility]  Clinic Office [phone]  Endoscopy Lab [phone]     You are scheduled for an EGD with Dr. Shea  on  2/7/23 at [facility] in [place].    Check in at the Hospital -1st floor, Information desk.   Call [phone] to reschedule.      You cannot have anything to eat or drink after Midnight. You can brush your teeth with a sip of water.       An adult friend/family member must come with you to drive you home.  You cannot drive, take a taxi, Uber/Lyft or bus to leave the Endoscopy Center alone.  If you do not have someone to drive you home, your test will be cancelled.       Please follow the directions of your doctor if you take any pills that thin your blood. If you take these meds: Aggrenox, Brilinta, Effient, Eliquis, Lovenox, Plavix, Pletal, Pradaxa, Ticilid, Xarelto or Coumadin, let the doctor's office know.      DON'T: On the morning of the test do not take insulin or pills for diabetes.       DO: On the morning of the test, do take any pills for blood pressure, heart, anti-rejection and or seizures with a small sip of water. Bring any inhalers with you.      A Covid test is required 72 hours before the procedure. The test is not needed with proof of vaccination > 2 weeks or previous Covid infection.      Leave all valuables and jewelry at home. You will be at the hospital for 2-4 hours.

## 2023-01-12 ENCOUNTER — OFFICE VISIT (OUTPATIENT)
Dept: RHEUMATOLOGY | Facility: CLINIC | Age: 68
End: 2023-01-12
Payer: MEDICARE

## 2023-01-12 VITALS
WEIGHT: 107.38 LBS | HEART RATE: 69 BPM | BODY MASS INDEX: 18.33 KG/M2 | SYSTOLIC BLOOD PRESSURE: 159 MMHG | DIASTOLIC BLOOD PRESSURE: 85 MMHG | HEIGHT: 64 IN

## 2023-01-12 DIAGNOSIS — M81.0 OSTEOPOROSIS, UNSPECIFIED OSTEOPOROSIS TYPE, UNSPECIFIED PATHOLOGICAL FRACTURE PRESENCE: ICD-10-CM

## 2023-01-12 DIAGNOSIS — L40.50 PSORIATIC ARTHRITIS: ICD-10-CM

## 2023-01-12 DIAGNOSIS — D50.9 IRON DEFICIENCY ANEMIA, UNSPECIFIED IRON DEFICIENCY ANEMIA TYPE: Primary | ICD-10-CM

## 2023-01-12 PROCEDURE — 3079F PR MOST RECENT DIASTOLIC BLOOD PRESSURE 80-89 MM HG: ICD-10-PCS | Mod: CPTII,S$GLB,, | Performed by: INTERNAL MEDICINE

## 2023-01-12 PROCEDURE — 99499 RISK ADDL DX/OHS AUDIT: ICD-10-PCS | Mod: S$GLB,,, | Performed by: INTERNAL MEDICINE

## 2023-01-12 PROCEDURE — 3008F BODY MASS INDEX DOCD: CPT | Mod: CPTII,S$GLB,, | Performed by: INTERNAL MEDICINE

## 2023-01-12 PROCEDURE — 99213 OFFICE O/P EST LOW 20 MIN: CPT | Mod: S$GLB,,, | Performed by: INTERNAL MEDICINE

## 2023-01-12 PROCEDURE — 3079F DIAST BP 80-89 MM HG: CPT | Mod: CPTII,S$GLB,, | Performed by: INTERNAL MEDICINE

## 2023-01-12 PROCEDURE — 99999 PR PBB SHADOW E&M-EST. PATIENT-LVL V: ICD-10-PCS | Mod: PBBFAC,,, | Performed by: INTERNAL MEDICINE

## 2023-01-12 PROCEDURE — 3008F PR BODY MASS INDEX (BMI) DOCUMENTED: ICD-10-PCS | Mod: CPTII,S$GLB,, | Performed by: INTERNAL MEDICINE

## 2023-01-12 PROCEDURE — 1159F MED LIST DOCD IN RCRD: CPT | Mod: CPTII,S$GLB,, | Performed by: INTERNAL MEDICINE

## 2023-01-12 PROCEDURE — 1125F PR PAIN SEVERITY QUANTIFIED, PAIN PRESENT: ICD-10-PCS | Mod: CPTII,S$GLB,, | Performed by: INTERNAL MEDICINE

## 2023-01-12 PROCEDURE — 1159F PR MEDICATION LIST DOCUMENTED IN MEDICAL RECORD: ICD-10-PCS | Mod: CPTII,S$GLB,, | Performed by: INTERNAL MEDICINE

## 2023-01-12 PROCEDURE — 4010F ACE/ARB THERAPY RXD/TAKEN: CPT | Mod: CPTII,S$GLB,, | Performed by: INTERNAL MEDICINE

## 2023-01-12 PROCEDURE — 3077F SYST BP >= 140 MM HG: CPT | Mod: CPTII,S$GLB,, | Performed by: INTERNAL MEDICINE

## 2023-01-12 PROCEDURE — 99999 PR PBB SHADOW E&M-EST. PATIENT-LVL V: CPT | Mod: PBBFAC,,, | Performed by: INTERNAL MEDICINE

## 2023-01-12 PROCEDURE — 4010F PR ACE/ARB THEARPY RXD/TAKEN: ICD-10-PCS | Mod: CPTII,S$GLB,, | Performed by: INTERNAL MEDICINE

## 2023-01-12 PROCEDURE — 1125F AMNT PAIN NOTED PAIN PRSNT: CPT | Mod: CPTII,S$GLB,, | Performed by: INTERNAL MEDICINE

## 2023-01-12 PROCEDURE — 99499 UNLISTED E&M SERVICE: CPT | Mod: S$GLB,,, | Performed by: INTERNAL MEDICINE

## 2023-01-12 PROCEDURE — 99213 PR OFFICE/OUTPT VISIT, EST, LEVL III, 20-29 MIN: ICD-10-PCS | Mod: S$GLB,,, | Performed by: INTERNAL MEDICINE

## 2023-01-12 PROCEDURE — 3077F PR MOST RECENT SYSTOLIC BLOOD PRESSURE >= 140 MM HG: ICD-10-PCS | Mod: CPTII,S$GLB,, | Performed by: INTERNAL MEDICINE

## 2023-01-12 RX ORDER — METHOTREXATE 2.5 MG/1
15 TABLET ORAL
Qty: 24 TABLET | Refills: 2 | Status: SHIPPED | OUTPATIENT
Start: 2023-01-12 | End: 2023-04-03 | Stop reason: SDUPTHER

## 2023-01-12 ASSESSMENT — ROUTINE ASSESSMENT OF PATIENT INDEX DATA (RAPID3)
TOTAL RAPID3 SCORE: 4.56
AM STIFFNESS SCORE: 1, YES
PAIN SCORE: 7
PATIENT GLOBAL ASSESSMENT SCORE: 5
PSYCHOLOGICAL DISTRESS SCORE: 3.3
FATIGUE SCORE: 6
MDHAQ FUNCTION SCORE: 0.5

## 2023-01-12 NOTE — PATIENT INSTRUCTIONS
*bivalent Covid vaccine booster hold methotrexate for one week after then resume   labs in 4 wks with Vitamin D  Referral  to Gastro for TONIE  *Increase Methotrexate 15mg po once a week with folic acid 1mg daily  Continue Zometa 4mg q 6 months per Dr. Marcelino in Heme-Onc   If no improvement with increased methotrexate would add guselkumab 100mg sc wk 0,4 then q 8 wks. If OK with Dr. Marcelino her oncologist RTC 3 months with standing labs

## 2023-01-12 NOTE — PROGRESS NOTES
Subjective:       Patient ID: Bhavna Landry is a 67 y.o. female.    Chief Complaint: PsA; guttate psoriasis;osteoporosis; OA failed R TKA    HPI last here 2/7/22. Continue tofacitinib despite my stopping it(lack of efficacy, breast cancer diagnosed at that time) and without my refilling. Last refill exp 4/25/22. Remained on methotrexate 10mg once a week. Had ordered guselkumab but patient never received. Received Reclast 5mg IV 3/17/22 and now on Zometa 4mg IV q 6 months from Dr. Marcelino in Heme-Onc.       Has several scattered psoriatic plaques in scalp, nowhere else  Has pain right index mcp and pip and left thumb and index mcp  Review of Systems   Constitutional:  Negative for appetite change, fatigue, fever and unexpected weight change.   HENT:  Negative for mouth sores.    Eyes:  Negative for visual disturbance.   Respiratory:  Negative for cough, shortness of breath and wheezing.    Cardiovascular:  Negative for chest pain and palpitations.   Gastrointestinal:  Negative for abdominal pain, anal bleeding, blood in stool, constipation, diarrhea, nausea and vomiting.   Genitourinary:  Negative for dysuria, frequency and urgency.   Musculoskeletal:  Negative for arthralgias, back pain, gait problem, joint swelling, myalgias, neck pain and neck stiffness.   Skin:  Negative for rash.   Neurological:  Negative for weakness, numbness and headaches.   Hematological:  Negative for adenopathy. Does not bruise/bleed easily.   Psychiatric/Behavioral:  Negative for sleep disturbance. The patient is not nervous/anxious.        Objective:   There were no vitals taken for this visit.     Physical Exam      1/13/2021 5/17/2021 10/25/2021 2/7/2022   Tender (CLARK-28) 2 / 28  0 / 28  0 / 28  10 / 28    Swollen (CLARK-28) 1 / 28  0 / 28 1 / 28 4 / 28    Provider Global 20 mm -- 0 mm 20 mm   Patient Global 20 mm 6 mm 50 mm 55 mm   ESR 2 mm/hr 2 mm/hr -- 3 mm/hr   CRP 0.2 mg/L 0.2 mg/L -- 0.3 mg/L   CLARK-28 (ESR) 1.84 (Remission) 0.57  (Remission) -- 3.87 (Moderate disease activity)   CLARK-28 (CRP) 2.38 (Remission) 1.11 (Remission) -- 4.16 (Moderate disease activity)   CDAI Score 3  -- 6  21.5      Narrative & Impression  EXAMINATION:  DEXA BONE DENSITY SPINE HIP     CLINICAL HISTORY:  Other specified disorders of bone density and structure, multiple sites     FINDINGS:  If your on Medicare part BMD lumbar spine 1.125 g/cm squared.  T-score -0.1, Z-score 2.1.     BMD left femoral neck is 0.746 g/cm squared.  T-score -2.1, Z-score -0.1.     BMD right femoral neck is 0.800 g/cm squared.  T-score -1.7, Z-score 0.3.     Impression:     Normal bone mineral density lumbar spine, osteopenia right hip and left hip.     Ten year probability of fracture:     Major osteoporotic fracture at 13.5%, hip fracture 3.1%.        Electronically signed by: John Bazan MD  Date:                                            07/22/2022  Time:                                           14:51          4/6/22:  I have still been taking xeljanz because I never received the Tremfya. I have been taking methotrexate but it doesnt have any refills.   Me  to Jessica Landry      1:43 PM  Debbie, [facility]. Will re-send and have Ro check on it. RJQ    Last read by Bhavna Landry at 9:35 PM on 9/12/2022.       CS    1:11 PM  Ro Camacho MA routed this conversation to Me        DR    12:16 PM  Jessica Genao MA routed this conversation to BALBIR Macario  to [facility] Clinical Support Staff (supporting Ro Camacho MA)      12:16 PM  What pharmacy did you send The medication too?  Ro Camacho MA  to Jessica Landry    CS    9:32 AM  MD Ro De La Cruz MA  Caller: pt @ [phone] (Yesterday,  2:32 PM)  Please tell breast cancer is common in otherwise healthy people and patients taking or not taking medication. Xeljanz recently has been found to have very slightly higher risk of cancer  compared to Enbrel or Humira. However, we stopped that any way as it wasn't helping. We had sent Rx for Tremfya instead. Are you taking it?  In any event would not start or continue Tremfya either until they decide what therapy is needed for the breast cancer. Would continue methotrexate as it has been used in much higher doses to treat breast cancer. Let me know what breast cancer therapy you need and we can decide on medication for the psoriatic arthritis RJQ              Last read by Bhavna Landry at 9:35 PM on 2022.     Dr. Millie Marcelino Heme-Onc 22:  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, estrogen receptor positive  pT1b N0 (i+)  grade 2 ER + OK - HER2 - IDC of the right breast.     She is status post mastectomy and sentinel node exam for IDC as well as DCIS of the right breast.      Oncotype returned high risk, which confers a benefit ot the addition of chemotherapy to AI.   RSclin showed an 8% risk of recurrence with 4% absolute benefit of chemotherapy. She has declined chemotherapy.      Tolerating anastrozole relatively well. Continue at this time. See below.   Continue Zometa q 6 months for bone health and possible risk reduction for breast cancer, next due 2023.      Continue active surveillance. Will see her back in 6 weeks for her scheduled visit. Left mammogram is due in March.   Assessment:   Cervical demyelination with etanercept in past, no anti-TNF  Failed secukinumab  Failed sulfasalazine  Did not feel tofacitinib helped and stopped after visit of 22 and not refilled( 22), but patient apparently has been getting automatic refills without refill approvals.   Guselkumab ordered 22 never filled      PsA  TJ 10 SJ 3 Pt global 50  ESR 10  CRP 0.5  DAS28 4.57  LXG77-IGR 4.06   CDAI 19 all MDA)  DAPSA  TJ 10 SJ 3 Pt global 5 Pt pain 7  CRP <0.05 = 25.05(MDA)  Osteoporosis DXA 22FRAX major 13.5 % hip 3.1%, s/p hip fracture;received  Reclast  zoledronic acid(5mg IV) 9/24/20, 3/17/22. Now on Zometa zoledronic acid (4mg IV) q 6 months for breast cancer so no need for Reclast  Psoriasis, mild scalp only  OA  Breast cancer on anastrozole  TONIE    Plan:   Standing labs in 4 wks with Vitamin D  Ref to Gastro for TONIE  *bivalent Covid booster  Hold methotrexate 1 week after then resume  Increase Methotrexate 15mg po once a week with folic acid 1mg daily  Continue Zometa 4mg q 6 months per Dr. Macrelino in Heme-Onc   If no improvement with increased methotrexate would add guselkumab 100mg sc wk 0,4 then q 8 wks. If OK with Dr. Marcelino her oncologist RTC 3 months with standing labs  RTC 3months with standing labs

## 2023-01-13 ENCOUNTER — TELEPHONE (OUTPATIENT)
Dept: GASTROENTEROLOGY | Facility: CLINIC | Age: 68
End: 2023-01-13
Payer: MEDICARE

## 2023-01-13 ENCOUNTER — TELEPHONE (OUTPATIENT)
Dept: INTERNAL MEDICINE | Facility: CLINIC | Age: 68
End: 2023-01-13
Payer: MEDICARE

## 2023-01-13 NOTE — TELEPHONE ENCOUNTER
----- Message from Idania Peralta sent at 1/13/2023  9:26 AM CST -----  Type:  Patient Returning Call    Who Called:Pt   Who Left Message for Patient:Jessica   Does the patient know what this is regarding?: yes   Would the patient rather a call back or a response via [facility]? Call back   Best Call Back Number:[phone]  Additional Information:

## 2023-01-13 NOTE — TELEPHONE ENCOUNTER
----- Message from Salena Lopes sent at 1/13/2023  9:45 AM CST -----  Contact: PHUONG HAZEL [[id number]] @ [phone]   TraMADoL (ULTRAM) 50 mg tablet is not helping her any longer  and [facility] can not go back into their system to tell her why she should not take this Rx with the other medications.     Patient need an Rx for her pain though.        Patient is aware Dr Vargas is out until 1/17 .

## 2023-01-13 NOTE — TELEPHONE ENCOUNTER
Spoke with patient and she would like to see Dr. Shea in clinic. I informed patient that Dr. Shea is booked until March. Patient just wanted an appointment to discuss the results after the EGD. I informed patient that Dr. Shea will make a recommendation after the procedure.

## 2023-01-13 NOTE — TELEPHONE ENCOUNTER
----- Message from Ivone Rainey MA sent at 1/12/2023  4:47 PM CST -----  Regarding: FW: New pt  She is scheduled for an EGD with Dr Shea on 2/7. She wanted to be seen in clinic if possible. Can you assist?  ----- Message -----  From: Karen Fields  Sent: 1/12/2023   2:20 PM CST  To: [facility] Clinical Staff  Subject: New pt                                           Rheumatology Clinic ( is Requesting a Call back Regarding scheduling pt appt for   Diagnosis: D50.9 (ICD-10-CM) - Iron deficiency anemia, unspecified iron deficiency anemia type   Attempt to schedule pt No availability In epic Please Call to discuss further.        Pt@[phone]

## 2023-01-14 ENCOUNTER — OFFICE VISIT (OUTPATIENT)
Dept: URGENT CARE | Facility: CLINIC | Age: 68
End: 2023-01-14
Payer: MEDICARE

## 2023-01-14 VITALS
TEMPERATURE: 98 F | HEART RATE: 64 BPM | SYSTOLIC BLOOD PRESSURE: 138 MMHG | OXYGEN SATURATION: 95 % | DIASTOLIC BLOOD PRESSURE: 74 MMHG | HEIGHT: 64 IN | WEIGHT: 107 LBS | RESPIRATION RATE: 17 BRPM | BODY MASS INDEX: 18.27 KG/M2

## 2023-01-14 DIAGNOSIS — J01.90 ACUTE BACTERIAL SINUSITIS: Primary | ICD-10-CM

## 2023-01-14 DIAGNOSIS — B96.89 ACUTE BACTERIAL SINUSITIS: Primary | ICD-10-CM

## 2023-01-14 DIAGNOSIS — S80.811A ABRASION OF SKIN OF RIGHT LOWER LEG: ICD-10-CM

## 2023-01-14 LAB
CTP QC/QA: YES
SARS-COV-2 AG RESP QL IA.RAPID: NEGATIVE

## 2023-01-14 PROCEDURE — 4010F PR ACE/ARB THEARPY RXD/TAKEN: ICD-10-PCS | Mod: CPTII,S$GLB,, | Performed by: NURSE PRACTITIONER

## 2023-01-14 PROCEDURE — 3075F SYST BP GE 130 - 139MM HG: CPT | Mod: CPTII,S$GLB,, | Performed by: NURSE PRACTITIONER

## 2023-01-14 PROCEDURE — 3075F PR MOST RECENT SYSTOLIC BLOOD PRESS GE 130-139MM HG: ICD-10-PCS | Mod: CPTII,S$GLB,, | Performed by: NURSE PRACTITIONER

## 2023-01-14 PROCEDURE — 1160F RVW MEDS BY RX/DR IN RCRD: CPT | Mod: CPTII,S$GLB,, | Performed by: NURSE PRACTITIONER

## 2023-01-14 PROCEDURE — 3008F BODY MASS INDEX DOCD: CPT | Mod: CPTII,S$GLB,, | Performed by: NURSE PRACTITIONER

## 2023-01-14 PROCEDURE — 3078F DIAST BP <80 MM HG: CPT | Mod: CPTII,S$GLB,, | Performed by: NURSE PRACTITIONER

## 2023-01-14 PROCEDURE — 3008F PR BODY MASS INDEX (BMI) DOCUMENTED: ICD-10-PCS | Mod: CPTII,S$GLB,, | Performed by: NURSE PRACTITIONER

## 2023-01-14 PROCEDURE — 1160F PR REVIEW ALL MEDS BY PRESCRIBER/CLIN PHARMACIST DOCUMENTED: ICD-10-PCS | Mod: CPTII,S$GLB,, | Performed by: NURSE PRACTITIONER

## 2023-01-14 PROCEDURE — 87811 SARS CORONAVIRUS 2 ANTIGEN POCT, MANUAL READ: ICD-10-PCS | Mod: QW,S$GLB,, | Performed by: NURSE PRACTITIONER

## 2023-01-14 PROCEDURE — 99214 OFFICE O/P EST MOD 30 MIN: CPT | Mod: S$GLB,,, | Performed by: NURSE PRACTITIONER

## 2023-01-14 PROCEDURE — 1159F PR MEDICATION LIST DOCUMENTED IN MEDICAL RECORD: ICD-10-PCS | Mod: CPTII,S$GLB,, | Performed by: NURSE PRACTITIONER

## 2023-01-14 PROCEDURE — 4010F ACE/ARB THERAPY RXD/TAKEN: CPT | Mod: CPTII,S$GLB,, | Performed by: NURSE PRACTITIONER

## 2023-01-14 PROCEDURE — 99214 PR OFFICE/OUTPT VISIT, EST, LEVL IV, 30-39 MIN: ICD-10-PCS | Mod: S$GLB,,, | Performed by: NURSE PRACTITIONER

## 2023-01-14 PROCEDURE — 3078F PR MOST RECENT DIASTOLIC BLOOD PRESSURE < 80 MM HG: ICD-10-PCS | Mod: CPTII,S$GLB,, | Performed by: NURSE PRACTITIONER

## 2023-01-14 PROCEDURE — 1159F MED LIST DOCD IN RCRD: CPT | Mod: CPTII,S$GLB,, | Performed by: NURSE PRACTITIONER

## 2023-01-14 PROCEDURE — 87811 SARS-COV-2 COVID19 W/OPTIC: CPT | Mod: QW,S$GLB,, | Performed by: NURSE PRACTITIONER

## 2023-01-14 RX ORDER — AMOXICILLIN AND CLAVULANATE POTASSIUM 875; 125 MG/1; MG/1
1 TABLET, FILM COATED ORAL EVERY 12 HOURS
Qty: 14 TABLET | Refills: 0 | Status: SHIPPED | OUTPATIENT
Start: 2023-01-14 | End: 2023-01-21

## 2023-01-14 NOTE — PROGRESS NOTES
"Subjective:       Patient ID: Bhavna Landry is a 67 y.o. female.    Vitals:  height is 5' 4" (1.626 m) and weight is 48.5 kg (107 lb). Her temperature is 97.6 °F (36.4 °C). Her blood pressure is 138/74 and her pulse is 64. Her respiration is 17 and oxygen saturation is 95%.     Chief Complaint: Cough and Leg Injury    67 year old patient presents cough/right leg injury. Patient stated symptoms started 3 days ago. Patient stated leg injury happened 2 weeks ago.  Patient reports she scratched her right lower leg on her bed frame, denies any trauma fall or injury, denies numbness or tingling, denies radiating pain, patient reports history of sinus infection and following up with ENT, patient reports after following up with the ENT they prescribed her the fluconazole for 15 days twice, the last dose she finished 1 and half week ago, patient reports she is trying to get in touch with her ENT to get advice or if she has to repeat the fluconazole again, ENT also did the swab and after that prescribed her the fluconazole, patient with history of sinusitis, ongoing for 1 week right now, denies fever, body aches or chills, denies cough, wheezing or shortness of breath, denies nausea, vomiting, diarrhea or abdominal pain, denies chest pain or dizziness positional lightheadedness, denies sore throat or trouble swallowing, denies loss of taste or smell, or any other symptoms        Cough  This is a new problem. The current episode started in the past 7 days (3 days). The problem has been unchanged. The problem occurs every few minutes. Associated symptoms include headaches, postnasal drip and a sore throat. Pertinent negatives include no fever or sweats. She has tried nothing for the symptoms.     Constitution: Negative for fever.   HENT:  Positive for postnasal drip and sore throat.    Respiratory:  Positive for cough.    Neurological:  Positive for headaches.     Past Medical History:   Diagnosis Date    Allergy     Amblyopia "     Anemia     Anticoagulant long-term use     Arthritis 02/02/1992    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, estrogen receptor positive 4/13/2022    Cataract     Depression     Dry eyes     Dry mouth     Duane's syndrome of right eye     Early dry stage nonexudative age-related macular degeneration of both eyes 9/20/2022    Fibromyalgia 4/17/2014    Fibromyalgia     Fractured hip     RIGHT HIP    GERD (gastroesophageal reflux disease)     History of psychiatric hospitalization     Hyperlipidemia 02/02/1992    Hypertension     Hypocalcemia     Hyponatremia     Kidney stone     Migraine headache     Osteoporosis     Psoriatic arthritis 02/02/1992    Right knee pain     post knee replacement surgery (possible rejectiion of metal)    RLS (restless legs syndrome)     Schizophrenia 02/02/1992    stable on meds    Squamous cell carcinoma of skin     Urinary tract infection        Objective:      Physical Exam   Constitutional: She is oriented to person, place, and time. She appears well-developed. She is cooperative.  Non-toxic appearance. She does not appear ill. No distress.   HENT:   Head: Normocephalic and atraumatic.   Ears:   Right Ear: Hearing, tympanic membrane, external ear and ear canal normal. Tympanic membrane is not injected. No middle ear effusion.   Left Ear: Hearing, tympanic membrane, external ear and ear canal normal. Tympanic membrane is not injected.  No middle ear effusion.   Nose: Nose normal. No mucosal edema, rhinorrhea or nasal deformity. No epistaxis. Right sinus exhibits no maxillary sinus tenderness and no frontal sinus tenderness. Left sinus exhibits no maxillary sinus tenderness and no frontal sinus tenderness.   Mouth/Throat: Uvula is midline, oropharynx is clear and moist and mucous membranes are normal. No trismus in the jaw. Normal dentition. No uvula swelling. No oropharyngeal exudate, posterior oropharyngeal edema, posterior oropharyngeal erythema, tonsillar  abscesses or cobblestoning.   No sinus tenderness on exam, no facial swelling noted      Comments: No sinus tenderness on exam, no facial swelling noted  Eyes: Conjunctivae and lids are normal. No scleral icterus.   Neck: Trachea normal and phonation normal. Neck supple. No edema present. No erythema present. No neck rigidity present.   Cardiovascular: Normal rate, regular rhythm, normal heart sounds and normal pulses.   Pulmonary/Chest: Effort normal and breath sounds normal. No stridor. No respiratory distress. She has no decreased breath sounds. She has no wheezes. She has no rhonchi. She has no rales.   Abdominal: Normal appearance.   Musculoskeletal: Normal range of motion.         General: No deformity. Normal range of motion.        Legs:       Comments: Skin abrasion to right lower extremity scabbed  ( two week old), healing, no surrounding erythema, swelling or tenderness noted no active drainage or discharge noted   Lymphadenopathy:     She has no cervical adenopathy.   Neurological: She is alert and oriented to person, place, and time. She exhibits normal muscle tone. Coordination normal.   Skin: Skin is warm, dry, intact, not diaphoretic and not pale.   Psychiatric: Her speech is normal and behavior is normal. Judgment and thought content normal.   Nursing note and vitals reviewed.      Results for orders placed or performed in visit on 01/14/23   SARS Coronavirus 2 Antigen, POCT Manual Read   Result Value Ref Range    SARS Coronavirus 2 Antigen Negative Negative     Acceptable Yes      *Note: Due to a large number of results and/or encounters for the requested time period, some results have not been displayed. A complete set of results can be found in Results Review.         Patient in no acute distress.  Vitals reassuring.  Discussed results/diagnosis/plan in depth with patient in clinic. Strict precautions given to patient to monitor for worsening signs and symptoms. Advised to follow  up with primary.All questions answered. Strict ER precautions given. If your symptoms worsens or fail to improve you should go to the Emergency Room. Discharge and follow-up instructions given verbally/printed. Discharge and follow-up instructions discussed with the patient who expressed understanding and willingness to comply with my recommendations.Patient voiced understanding and in agreement with current treatment plan.     Please be advised this text was dictated with [email or web address] software and may contain errors due to translation.    Assessment:       1. Acute bacterial sinusitis    2. Abrasion of skin of right lower leg          Plan:         Acute bacterial sinusitis  -     SARS Coronavirus 2 Antigen, POCT Manual Read  -     amoxicillin-clavulanate 875-125mg (AUGMENTIN) 875-125 mg per tablet; Take 1 tablet by mouth every 12 (twelve) hours. for 7 days  Dispense: 14 tablet; Refill: 0    Abrasion of skin of right lower leg           Medical Decision Making:   Clinical Tests:   Lab Tests: Reviewed  Urgent Care Management:  Patient in no acute distress.  Vitals reassuring.  On exam, patient is nontoxic appearing and afebrile.  No sinus tenderness noted on exam.  Patient reported history of sinus infection and was treated last year with antibiotics and then followed up with the ENT who prescribed her the medication for fungal infection.  Patient reports last set of fluconazole she took 1 and half week ago.  Last antibiotic treatment in October and  November with Augmentin x2 and 1 time with doxycycline.  Will treat with antibiotic and strongly recommended patient to follow-up with ENT for further evaluation and treatment plan if no improvement with antibiotics due to her history of treatment with fluconazole as she will need the lab work also.  Patient agreed with plan or care will start the antibiotic and contact ENT for further evaluation.  Patient is skin abrasion to right lower extremity healing, scabbed, no  concern for infection or cellulitis.  2-week-old.  No trauma or fall reported.  Patient voiced understanding and in agreement with current treatment plan.           Patient Instructions   PLEASE READ YOUR DISCHARGE INSTRUCTIONS ENTIRELY AS IT CONTAINS IMPORTANT INFORMATION.    Try over the counter afrin, but for NO LONGER than 3 days as it can cause rebound congestion. Then you can switch to flonase if you find the nasal sprays are working well.     If you find this dries your nose out or your nose bleeds, try using over the counter nasal saline a few minutes prior to using the flonase to moisten the lining of your nose and throughout the day as needed.     Please take an over the counter antihistamine medication (allegra/Claritin/Zyrtec) of your choice as directed and mucinex-D (if it gives you funny heartbeats you can switch to regular mucinex).     You can try breathe right strips at night to help you breathe.  A cool mist humidifier in bedroom may help with cough and relieve stuffy nose.     Sore throat recommendations: Warm fluids, warm salt water gargles, throat lozenges, tea, honey, soup, rest, hydration.     Sinus rinses DO NOT USE TAP WATER, if you must, water must be a rolling boil for 1 minute, let it cool, then use.  May use distilled water, or over the counter nasal saline rinses.  Vics vapor rub in shower to help open nasal passages.  May use nasal gel to keep passages moisturized.  May use Nasal saline sprays during the day for added relief of congestion.   For those who go to the gym, please do not use the sauna or steam room now to clear sinuses.    During pollen season, change shirt if you are outside for a while when you go in.  Also wash your face.  Do not touch your face with your hands.  Wash your hands often in general while ill, avoid face contact with hands. Good nutrition. Lots of rest. Plenty of fluids    Over the counter you can use Tylenol (acetominophen) or Ibuprofen for your minor  aches and pains as long as you have no contraindications.        Please return or see your primary care doctor if you develop new or worsening symptoms.     Please arrange follow up with your primary medical clinic as soon as possible. You must understand that you've received an Urgent Care treatment only and that you may be released before all of your medical problems are known or treated. You, the patient, will arrange for follow up as instructed. If your symptoms worsen or fail to improve you should go to the Emergency Room.

## 2023-01-14 NOTE — PATIENT INSTRUCTIONS
PLEASE READ YOUR DISCHARGE INSTRUCTIONS ENTIRELY AS IT CONTAINS IMPORTANT INFORMATION.    Try over the counter afrin, but for NO LONGER than 3 days as it can cause rebound congestion. Then you can switch to flonase if you find the nasal sprays are working well.     If you find this dries your nose out or your nose bleeds, try using over the counter nasal saline a few minutes prior to using the flonase to moisten the lining of your nose and throughout the day as needed.     Please take an over the counter antihistamine medication (allegra/Claritin/Zyrtec) of your choice as directed and mucinex-D (if it gives you funny heartbeats you can switch to regular mucinex).     You can try breathe right strips at night to help you breathe.  A cool mist humidifier in bedroom may help with cough and relieve stuffy nose.     Sore throat recommendations: Warm fluids, warm salt water gargles, throat lozenges, tea, honey, soup, rest, hydration.     Sinus rinses DO NOT USE TAP WATER, if you must, water must be a rolling boil for 1 minute, let it cool, then use.  May use distilled water, or over the counter nasal saline rinses.  Vics vapor rub in shower to help open nasal passages.  May use nasal gel to keep passages moisturized.  May use Nasal saline sprays during the day for added relief of congestion.   For those who go to the gym, please do not use the sauna or steam room now to clear sinuses.    During pollen season, change shirt if you are outside for a while when you go in.  Also wash your face.  Do not touch your face with your hands.  Wash your hands often in general while ill, avoid face contact with hands. Good nutrition. Lots of rest. Plenty of fluids    Over the counter you can use Tylenol (acetominophen) or Ibuprofen for your minor aches and pains as long as you have no contraindications.        Please return or see your primary care doctor if you develop new or worsening symptoms.     Please arrange follow up with your  primary medical clinic as soon as possible. You must understand that you've received an Urgent Care treatment only and that you may be released before all of your medical problems are known or treated. You, the patient, will arrange for follow up as instructed. If your symptoms worsen or fail to improve you should go to the Emergency Room.

## 2023-01-16 NOTE — PROGRESS NOTES
Authorization sent to insurance   I have personally reviewed the history, confirmed exam findings, and discussed assessment and plan with fellow.

## 2023-01-17 ENCOUNTER — TELEPHONE (OUTPATIENT)
Dept: INTERNAL MEDICINE | Facility: CLINIC | Age: 68
End: 2023-01-17
Payer: MEDICARE

## 2023-01-17 ENCOUNTER — PATIENT MESSAGE (OUTPATIENT)
Dept: INTERNAL MEDICINE | Facility: CLINIC | Age: 68
End: 2023-01-17
Payer: MEDICARE

## 2023-01-17 NOTE — TELEPHONE ENCOUNTER
Called pt to triage pt pain, no answer. Left message for pt to return call. Will send portal message as well.

## 2023-01-17 NOTE — TELEPHONE ENCOUNTER
----- Message from Alma Rosa Gonzalez sent at 1/17/2023  1:12 PM CST -----  Contact: [phone] pt  Pt is requesting something that is stronger than the Tramadol be called in. Per pt, she is in a lot of pain. Pt is using   [id number] - [place] [address] AT [facility] [place]  [address] [place] [address]  Phone: [phone] Fax: [phone]            Thank you

## 2023-01-17 NOTE — TELEPHONE ENCOUNTER
Message already sent - where is pain?  In back if so . Should see pain management.  I need more info

## 2023-01-18 DIAGNOSIS — F17.200 NEEDS SMOKING CESSATION EDUCATION: ICD-10-CM

## 2023-01-18 NOTE — TELEPHONE ENCOUNTER
Pt returned call. Pt c/o low back pain that increases when she sits up or put her feet down to stand up or walk. Pt states any pressure on the feet increases her pain. Explained to pt that PCP recommends pt f/u with pain management for back pain. Pt verbalized concern that the injections didn't help. Encouraged pt to discuss with pain management to find another successful treatment. Pt verbalized understanding and will call to schedule with pain management today.

## 2023-01-24 ENCOUNTER — OFFICE VISIT (OUTPATIENT)
Dept: OTOLARYNGOLOGY | Facility: CLINIC | Age: 68
End: 2023-01-24
Payer: MEDICARE

## 2023-01-24 ENCOUNTER — TELEPHONE (OUTPATIENT)
Dept: INTERNAL MEDICINE | Facility: CLINIC | Age: 68
End: 2023-01-24
Payer: MEDICARE

## 2023-01-24 VITALS — WEIGHT: 103 LBS | BODY MASS INDEX: 18.95 KG/M2 | HEIGHT: 62 IN

## 2023-01-24 DIAGNOSIS — J32.0 CHRONIC MAXILLARY SINUSITIS: Primary | ICD-10-CM

## 2023-01-24 DIAGNOSIS — J34.3 NASAL TURBINATE HYPERTROPHY: ICD-10-CM

## 2023-01-24 DIAGNOSIS — M54.50 CHRONIC LOW BACK PAIN WITHOUT SCIATICA, UNSPECIFIED BACK PAIN LATERALITY: Primary | ICD-10-CM

## 2023-01-24 DIAGNOSIS — M95.0 NASAL VALVE COLLAPSE: ICD-10-CM

## 2023-01-24 DIAGNOSIS — G89.29 CHRONIC LOW BACK PAIN WITHOUT SCIATICA, UNSPECIFIED BACK PAIN LATERALITY: Primary | ICD-10-CM

## 2023-01-24 PROCEDURE — 3008F BODY MASS INDEX DOCD: CPT | Mod: CPTII,S$GLB,, | Performed by: OTOLARYNGOLOGY

## 2023-01-24 PROCEDURE — 1159F MED LIST DOCD IN RCRD: CPT | Mod: CPTII,S$GLB,, | Performed by: OTOLARYNGOLOGY

## 2023-01-24 PROCEDURE — 87075 CULTR BACTERIA EXCEPT BLOOD: CPT | Performed by: OTOLARYNGOLOGY

## 2023-01-24 PROCEDURE — 3288F FALL RISK ASSESSMENT DOCD: CPT | Mod: CPTII,S$GLB,, | Performed by: OTOLARYNGOLOGY

## 2023-01-24 PROCEDURE — 1101F PT FALLS ASSESS-DOCD LE1/YR: CPT | Mod: CPTII,S$GLB,, | Performed by: OTOLARYNGOLOGY

## 2023-01-24 PROCEDURE — 99214 PR OFFICE/OUTPT VISIT, EST, LEVL IV, 30-39 MIN: ICD-10-PCS | Mod: 25,S$GLB,, | Performed by: OTOLARYNGOLOGY

## 2023-01-24 PROCEDURE — 87070 CULTURE OTHR SPECIMN AEROBIC: CPT | Performed by: OTOLARYNGOLOGY

## 2023-01-24 PROCEDURE — 1159F PR MEDICATION LIST DOCUMENTED IN MEDICAL RECORD: ICD-10-PCS | Mod: CPTII,S$GLB,, | Performed by: OTOLARYNGOLOGY

## 2023-01-24 PROCEDURE — 1125F AMNT PAIN NOTED PAIN PRSNT: CPT | Mod: CPTII,S$GLB,, | Performed by: OTOLARYNGOLOGY

## 2023-01-24 PROCEDURE — 4010F ACE/ARB THERAPY RXD/TAKEN: CPT | Mod: CPTII,S$GLB,, | Performed by: OTOLARYNGOLOGY

## 2023-01-24 PROCEDURE — 99214 OFFICE O/P EST MOD 30 MIN: CPT | Mod: 25,S$GLB,, | Performed by: OTOLARYNGOLOGY

## 2023-01-24 PROCEDURE — 99999 PR PBB SHADOW E&M-EST. PATIENT-LVL III: ICD-10-PCS | Mod: PBBFAC,,, | Performed by: OTOLARYNGOLOGY

## 2023-01-24 PROCEDURE — 31231 NASAL ENDOSCOPY DX: CPT | Mod: S$GLB,,, | Performed by: OTOLARYNGOLOGY

## 2023-01-24 PROCEDURE — 1125F PR PAIN SEVERITY QUANTIFIED, PAIN PRESENT: ICD-10-PCS | Mod: CPTII,S$GLB,, | Performed by: OTOLARYNGOLOGY

## 2023-01-24 PROCEDURE — 3008F PR BODY MASS INDEX (BMI) DOCUMENTED: ICD-10-PCS | Mod: CPTII,S$GLB,, | Performed by: OTOLARYNGOLOGY

## 2023-01-24 PROCEDURE — 4010F PR ACE/ARB THEARPY RXD/TAKEN: ICD-10-PCS | Mod: CPTII,S$GLB,, | Performed by: OTOLARYNGOLOGY

## 2023-01-24 PROCEDURE — 99999 PR PBB SHADOW E&M-EST. PATIENT-LVL III: CPT | Mod: PBBFAC,,, | Performed by: OTOLARYNGOLOGY

## 2023-01-24 PROCEDURE — 1101F PR PT FALLS ASSESS DOC 0-1 FALLS W/OUT INJ PAST YR: ICD-10-PCS | Mod: CPTII,S$GLB,, | Performed by: OTOLARYNGOLOGY

## 2023-01-24 PROCEDURE — 31231 NASAL/SINUS ENDOSCOPY: ICD-10-PCS | Mod: S$GLB,,, | Performed by: OTOLARYNGOLOGY

## 2023-01-24 PROCEDURE — 87102 FUNGUS ISOLATION CULTURE: CPT | Performed by: OTOLARYNGOLOGY

## 2023-01-24 PROCEDURE — 3288F PR FALLS RISK ASSESSMENT DOCUMENTED: ICD-10-PCS | Mod: CPTII,S$GLB,, | Performed by: OTOLARYNGOLOGY

## 2023-01-24 NOTE — TELEPHONE ENCOUNTER
----- Message from Ana Lorenzana sent at 1/24/2023  2:48 PM CST -----  Contact: [phone]  Pt is calling she states she is needing a referral for a dr for Degenerative   disc in back and sciatic nerve please give return call also she is asking for meloxican

## 2023-01-24 NOTE — TELEPHONE ENCOUNTER
Returned patient call no answer LVM to return call. Patient is requesting a referral Degenerative   disc in back and sciatic nerve

## 2023-01-24 NOTE — PROGRESS NOTES
"  Subjective:      Bhavna is a 67 y.o. female who comes for follow-up of sinusitis.  Her last visit with me was on 11/8/2022.  Now over 1-1/2 years status-post endoscopic sinus surgery.  Finished 28 days of oral diflucan and sinus rinse with voriconazole.  Improved mucus, still postnasal drip, occasional right-sided green crusted discharge.  Mostly bothered by bilateral nasal congestion, improved with decongestant, improved also by stretching the cheeks laterally.  Using flonase daily for many months.        %       The patient's medications, allergies, past medical, surgical, social and family histories were reviewed and updated as appropriate.    A detailed review of systems was obtained with pertinent positives as per the above HPI, and otherwise negative.        Objective:     Ht 5' 2" (1.575 m)   Wt 46.7 kg (103 lb)   BMI 18.84 kg/m²        Constitutional:   She appears well-developed. She is cooperative.     Head:  Normocephalic.     Nose:  No mucosal edema, rhinorrhea, septal deviation or polyps. No epistaxis. Turbinate hypertrophy.  Turbinates normal and no turbinate masses.  Right sinus exhibits no maxillary sinus tenderness and no frontal sinus tenderness. Left sinus exhibits no maxillary sinus tenderness and no frontal sinus tenderness.   Positive modified Malena maneuver bilaterally    Mouth/Throat  Oropharynx clear and moist without lesions or asymmetry. No oropharyngeal exudate or posterior oropharyngeal erythema.     Neck:  No adenopathy. Normal range of motion present.     She has no cervical adenopathy.     Procedure    Nasal endoscopy performed.  See procedure note.        Data Reviewed    WBC (K/uL)   Date Value   12/23/2022 6.96     Eosinophil % (%)   Date Value   12/23/2022 2.4     Eos, Fluid (%)   Date Value   05/05/2014 5     Eos # (K/uL)   Date Value   12/23/2022 0.2     Platelets (K/uL)   Date Value   12/23/2022 425     Glucose (mg/dL)   Date Value   12/23/2022 100     No results found for: " IGE    Pathology report indicated non-eosinophilic chronic inflammation.  Cultures showed Curvularia in Nov 2022.      Assessment:     1. Chronic maxillary sinusitis    2. Nasal turbinate hypertrophy    3. Nasal valve collapse         Plan:     New cultures taken, will consider refill antifungal if still positive.  She would benefit from nasal valve remodeling with radiofrequency reduction of the inferior turbinates for the treatment of her condition.  I discussed the risks, benefits and alternatives to surgery with the patient, as well as the expected postoperative course.  I gave her the opportunity to ask questions and I answered all of them.  I provided relevant printed information on her condition for her to review at home.   She will call when she decides to schedule the procedure.  She will need to return for a postoperative visit 1 week after the procedure.  Follow up for the procedure.

## 2023-01-24 NOTE — PROCEDURES
Nasal/sinus endoscopy    Date/Time: 1/24/2023 10:30 AM  Performed by: Nathaniel Goode MD  Authorized by: Nathaniel Goode MD     Consent Done?:  Yes (Verbal)  Anesthesia:     Local anesthetic:  Lidocaine 4% and Chad-Synephrine 1/2%    Patient tolerance:  Patient tolerated the procedure well with no immediate complications  Nose:     Procedure Performed:  Nasal Endoscopy  External:      No external nasal deformity  Intranasal:      Mucosa no polyps     Mucosa ulcers not present     No mucosa lesions present     Turbinates not enlarged     No septum gross deformity  Nasopharynx:      No mucosa lesions     Adenoids not present     Posterior choanae patent     Eustachian tube patent     Nonpurulent mucus on left  Mucoid PND on right, swabbed for culture  No crusting seen

## 2023-01-26 ENCOUNTER — TELEPHONE (OUTPATIENT)
Dept: INTERNAL MEDICINE | Facility: CLINIC | Age: 68
End: 2023-01-26
Payer: MEDICARE

## 2023-01-26 LAB — BACTERIA SPEC AEROBE CULT: NORMAL

## 2023-01-26 NOTE — TELEPHONE ENCOUNTER
----- Message from Brenton Fields sent at 1/26/2023  1:48 PM CST -----  Contact: self [phone]  Pt requesting an Rx stool softner stated miserable constipated need something stronger than was prescribes.      [id number] - [place] - [address] AT [facility] [address]  [address] [place] [address]  Phone: [phone] Fax: [phone]      Please call and advise

## 2023-01-31 ENCOUNTER — PATIENT MESSAGE (OUTPATIENT)
Dept: OTOLARYNGOLOGY | Facility: CLINIC | Age: 68
End: 2023-01-31
Payer: MEDICARE

## 2023-01-31 LAB — BACTERIA SPEC ANAEROBE CULT: NORMAL

## 2023-02-03 ENCOUNTER — TELEPHONE (OUTPATIENT)
Dept: GASTROENTEROLOGY | Facility: CLINIC | Age: 68
End: 2023-02-03
Payer: MEDICARE

## 2023-02-03 ENCOUNTER — TELEPHONE (OUTPATIENT)
Dept: ENDOSCOPY | Facility: HOSPITAL | Age: 68
End: 2023-02-03
Payer: MEDICARE

## 2023-02-03 NOTE — TELEPHONE ENCOUNTER
----- Message from Mame Franklin sent at 2/3/2023 11:35 AM CST -----  Type:  Needs Medical Advice    Who Called: PT  Symptoms (please be specific):    How long has patient had these symptoms:    Pharmacy name and phone #:    Would the patient rather a call back or a response via [facility]?   Best Call Back Number: [phone]  Additional Information: PT IS F/U ABOUT THE LETTER SHE RECEIVED ABOUT THE COVID TEST- NO ONE IS DOING IT UNLESS SHE HAS SYMPTOMS. SHE HAS NO SYMPTOMS. PLEASE CALL HER

## 2023-02-03 NOTE — TELEPHONE ENCOUNTER
Spoke with patient about arrival time @ 1230.   Covid test = vacc    NPO status reviewed: Patient must have nothing to eat after midnight.  Pt may have CLEAR liquids ONLY until completely NPO 3 hrs @ 0900.    Medications: Do not take Insulin or oral diabetic medications the day of the procedure.  Take as prescribed: heart, seizure and blood pressure medication in the morning with a sip of water (less than an ounce).  Take any breathing medications and bring inhalers to hospital with you Leave all valuables and jewelry at home.     Wear comfortable clothes to procedure to change into hospital gown You cannot drive for 24 hours after your procedure because you will receive sedation for your procedure to make you comfortable.  A ride must be provided at discharge.

## 2023-02-06 ENCOUNTER — TELEPHONE (OUTPATIENT)
Dept: OTOLARYNGOLOGY | Facility: CLINIC | Age: 68
End: 2023-02-06
Payer: MEDICARE

## 2023-02-06 NOTE — TELEPHONE ENCOUNTER
----- Message from Lupe Irby sent at 2/6/2023 12:25 PM CST -----  Regarding: Test Results  Contact: Pt @ [phone]  Pt is calling to get test results. Asking for an urgent call back

## 2023-02-07 ENCOUNTER — HOSPITAL ENCOUNTER (OUTPATIENT)
Facility: HOSPITAL | Age: 68
Discharge: HOME | End: 2023-02-07
Attending: INTERNAL MEDICINE | Admitting: INTERNAL MEDICINE
Payer: MEDICARE

## 2023-02-07 ENCOUNTER — ANESTHESIA (OUTPATIENT)
Dept: ENDOSCOPY | Facility: HOSPITAL | Age: 68
End: 2023-02-07
Payer: MEDICARE

## 2023-02-07 ENCOUNTER — ANESTHESIA EVENT (OUTPATIENT)
Dept: ENDOSCOPY | Facility: HOSPITAL | Age: 68
End: 2023-02-07
Payer: MEDICARE

## 2023-02-07 ENCOUNTER — PES CALL (OUTPATIENT)
Dept: ADMINISTRATIVE | Facility: CLINIC | Age: 68
End: 2023-02-07
Payer: MEDICARE

## 2023-02-07 DIAGNOSIS — D50.0 IRON DEFICIENCY ANEMIA DUE TO CHRONIC BLOOD LOSS: ICD-10-CM

## 2023-02-07 PROCEDURE — D9220A PRA ANESTHESIA: Mod: CRNA,,, | Performed by: STUDENT IN AN ORGANIZED HEALTH CARE EDUCATION/TRAINING PROGRAM

## 2023-02-07 PROCEDURE — 63600175 PHARM REV CODE 636 W HCPCS: Performed by: STUDENT IN AN ORGANIZED HEALTH CARE EDUCATION/TRAINING PROGRAM

## 2023-02-07 PROCEDURE — 37000009 HC ANESTHESIA EA ADD 15 MINS: Performed by: INTERNAL MEDICINE

## 2023-02-07 PROCEDURE — 43239 EGD BIOPSY SINGLE/MULTIPLE: CPT | Mod: ,,, | Performed by: INTERNAL MEDICINE

## 2023-02-07 PROCEDURE — 37000008 HC ANESTHESIA 1ST 15 MINUTES: Performed by: INTERNAL MEDICINE

## 2023-02-07 PROCEDURE — 88305 TISSUE EXAM BY PATHOLOGIST: CPT | Performed by: STUDENT IN AN ORGANIZED HEALTH CARE EDUCATION/TRAINING PROGRAM

## 2023-02-07 PROCEDURE — 88305 TISSUE EXAM BY PATHOLOGIST: ICD-10-PCS | Mod: 26,,, | Performed by: STUDENT IN AN ORGANIZED HEALTH CARE EDUCATION/TRAINING PROGRAM

## 2023-02-07 PROCEDURE — 99203 PR OFFICE/OUTPT VISIT, NEW, LEVL III, 30-44 MIN: ICD-10-PCS | Mod: 25,,, | Performed by: INTERNAL MEDICINE

## 2023-02-07 PROCEDURE — 88342 CHG IMMUNOCYTOCHEMISTRY: ICD-10-PCS | Mod: 26,,, | Performed by: STUDENT IN AN ORGANIZED HEALTH CARE EDUCATION/TRAINING PROGRAM

## 2023-02-07 PROCEDURE — 43239 PR EGD, FLEX, W/BIOPSY, SGL/MULTI: ICD-10-PCS | Mod: ,,, | Performed by: INTERNAL MEDICINE

## 2023-02-07 PROCEDURE — 99203 OFFICE O/P NEW LOW 30 MIN: CPT | Mod: 25,,, | Performed by: INTERNAL MEDICINE

## 2023-02-07 PROCEDURE — D9220A PRA ANESTHESIA: ICD-10-PCS | Mod: ANES,,, | Performed by: ANESTHESIOLOGY

## 2023-02-07 PROCEDURE — D9220A PRA ANESTHESIA: Mod: ANES,,, | Performed by: ANESTHESIOLOGY

## 2023-02-07 PROCEDURE — 43239 EGD BIOPSY SINGLE/MULTIPLE: CPT | Performed by: INTERNAL MEDICINE

## 2023-02-07 PROCEDURE — 88342 IMHCHEM/IMCYTCHM 1ST ANTB: CPT | Performed by: STUDENT IN AN ORGANIZED HEALTH CARE EDUCATION/TRAINING PROGRAM

## 2023-02-07 PROCEDURE — 88342 IMHCHEM/IMCYTCHM 1ST ANTB: CPT | Mod: 26,,, | Performed by: STUDENT IN AN ORGANIZED HEALTH CARE EDUCATION/TRAINING PROGRAM

## 2023-02-07 PROCEDURE — D9220A PRA ANESTHESIA: ICD-10-PCS | Mod: CRNA,,, | Performed by: STUDENT IN AN ORGANIZED HEALTH CARE EDUCATION/TRAINING PROGRAM

## 2023-02-07 PROCEDURE — 88305 TISSUE EXAM BY PATHOLOGIST: CPT | Mod: 26,,, | Performed by: STUDENT IN AN ORGANIZED HEALTH CARE EDUCATION/TRAINING PROGRAM

## 2023-02-07 PROCEDURE — 25000003 PHARM REV CODE 250: Performed by: INTERNAL MEDICINE

## 2023-02-07 PROCEDURE — 27201012 HC FORCEPS, HOT/COLD, DISP: Performed by: INTERNAL MEDICINE

## 2023-02-07 PROCEDURE — 25000003 PHARM REV CODE 250: Performed by: STUDENT IN AN ORGANIZED HEALTH CARE EDUCATION/TRAINING PROGRAM

## 2023-02-07 RX ORDER — PROPOFOL 10 MG/ML
INJECTION, EMULSION INTRAVENOUS CONTINUOUS PRN
Status: DISCONTINUED | OUTPATIENT
Start: 2023-02-07 | End: 2023-02-07

## 2023-02-07 RX ORDER — PROPOFOL 10 MG/ML
VIAL (ML) INTRAVENOUS
Status: DISCONTINUED | OUTPATIENT
Start: 2023-02-07 | End: 2023-02-07

## 2023-02-07 RX ORDER — SODIUM CHLORIDE 9 MG/ML
INJECTION, SOLUTION INTRAVENOUS CONTINUOUS
Status: DISCONTINUED | OUTPATIENT
Start: 2023-02-07 | End: 2023-02-07 | Stop reason: HOSPADM

## 2023-02-07 RX ORDER — LIDOCAINE HCL/PF 100 MG/5ML
SYRINGE (ML) INTRAVENOUS
Status: DISCONTINUED | OUTPATIENT
Start: 2023-02-07 | End: 2023-02-07

## 2023-02-07 RX ORDER — SODIUM CHLORIDE 0.9 % (FLUSH) 0.9 %
10 SYRINGE (ML) INJECTION
Status: DISCONTINUED | OUTPATIENT
Start: 2023-02-07 | End: 2023-02-07 | Stop reason: HOSPADM

## 2023-02-07 RX ADMIN — PROPOFOL 125 MCG/KG/MIN: 10 INJECTION, EMULSION INTRAVENOUS at 01:02

## 2023-02-07 RX ADMIN — LIDOCAINE HYDROCHLORIDE 50 MG: 20 INJECTION, SOLUTION INTRAVENOUS at 01:02

## 2023-02-07 RX ADMIN — SODIUM CHLORIDE: 0.9 INJECTION, SOLUTION INTRAVENOUS at 12:02

## 2023-02-07 RX ADMIN — PROPOFOL 30 MG: 10 INJECTION, EMULSION INTRAVENOUS at 01:02

## 2023-02-07 NOTE — ANESTHESIA POSTPROCEDURE EVALUATION
Anesthesia Post Evaluation    Patient: Bhavna Landry    Procedure(s) Performed: Procedure(s) (LRB):  EGD (ESOPHAGOGASTRODUODENOSCOPY) (N/A)    Final Anesthesia Type: general      Patient location during evaluation: GI PACU  Patient participation: Yes- Able to Participate  Level of consciousness: awake and alert  Post-procedure vital signs: reviewed and stable  Pain management: adequate  Airway patency: patent    PONV status at discharge: No PONV  Anesthetic complications: no      Cardiovascular status: blood pressure returned to baseline and stable  Respiratory status: unassisted, spontaneous ventilation and room air  Hydration status: euvolemic  Follow-up not needed.          Vitals Value Taken Time   /58 02/07/23 1327   Temp 36.6 °C (97.9 °F) 02/07/23 1313   Pulse 72 02/07/23 1327   Resp 21 02/07/23 1327   SpO2 98 % 02/07/23 1327         Event Time   Out of Recovery 13:47:14         Pain/Azra Score: Azra Score: 10 (2/7/2023  1:26 PM)

## 2023-02-07 NOTE — PROVATION PATIENT INSTRUCTIONS
Discharge Summary/Instructions after an Endoscopic Procedure  Patient Name: Bhavna Pierre  Patient MRN: [id number]  Patient YOB: 1955 Tuesday, February 7, 2023  Dougie Shea MD  Dear patient,  As a result of recent federal legislation (The Federal Cures Act), you may   receive lab or pathology results from your procedure in your [facility]   account before your physician is able to contact you. Your physician or   their representative will relay the results to you with their   recommendations at their soonest availability.  Thank you,  Your health is very important to us during the Covid Crisis. Following your   procedure today, you will receive a daily text for 2 weeks asking about   signs or symptoms of Covid 19.  Please respond to this text when you   receive it so we can follow up and keep you as safe as possible.   RESTRICTIONS:  During your procedure today, you received medications for sedation.  These   medications may affect your judgment, balance and coordination.  Therefore,   for 24 hours, you have the following restrictions:   - DO NOT drive a car, operate machinery, make legal/financial decisions,   sign important papers or drink alcohol.    ACTIVITY:  Today: no heavy lifting, straining or running due to procedural   sedation/anesthesia.  The following day: return to full activity including work.  DIET:  Eat and drink normally unless instructed otherwise.     TREATMENT FOR COMMON SIDE EFFECTS:  - Mild abdominal pain, nausea, belching, bloating or excessive gas:  rest,   eat lightly and use a heating pad.  - Sore Throat: treat with throat lozenges and/or gargle with warm salt   water.  - Because air was used during the procedure, expelling large amounts of air   from your rectum or belching is normal.  - If a bowel prep was taken, you may not have a bowel movement for 1-3 days.    This is normal.  SYMPTOMS TO WATCH FOR AND REPORT TO YOUR PHYSICIAN:  1. Abdominal pain or bloating, other than  gas cramps.  2. Chest pain.  3. Back pain.  4. Signs of infection such as: chills or fever occurring within 24 hours   after the procedure.  5. Rectal bleeding, which would show as bright red, maroon, or black stools.   (A tablespoon of blood from the rectum is not serious, especially if   hemorrhoids are present.)  6. Vomiting.  7. Weakness or dizziness.  GO DIRECTLY TO THE NEAREST EMERGENCY ROOM IF YOU HAVE ANY OF THE FOLLOWING:      Difficulty breathing              Chills and/or fever over 101 F   Persistent vomiting and/or vomiting blood   Severe abdominal pain   Severe chest pain   Black, tarry stools   Bleeding- more than one tablespoon   Any other symptom or condition that you feel may need urgent attention  Your doctor recommends these additional instructions:  If any biopsies were taken, your doctors clinic will contact you in 1 to 2   weeks with any results.  - Discharge patient to home.   - Patient has a contact number available for emergencies.  The signs and   symptoms of potential delayed complications were discussed with the   patient.  Return to normal activities tomorrow.  Written discharge   instructions were provided to the patient.   - Resume previous diet.   - Continue present medications.   - Await pathology results.   - Await path, mild erosive gastropathy may be causing iron def, need to   reduce Aleve and other NSAIDs use. Consider video capsule down the road.   Start protonix daily after pathology results.  For questions, problems or results please call your physician - Dougie Shea MD.  EMERGENCY PHONE NUMBER: [phone],  LAB RESULTS: [phone]  IF A COMPLICATION OR EMERGENCY SITUATION ARISES AND YOU ARE UNABLE TO REACH   YOUR PHYSICIAN - GO DIRECTLY TO THE EMERGENCY ROOM.  Dougie Shea MD  2/7/2023 1:08:29 PM  This report has been verified and signed electronically.  Dear patient,  As a result of recent federal legislation (The Federal Cures Act), you may   receive  lab or pathology results from your procedure in your [facility]   account before your physician is able to contact you. Your physician or   their representative will relay the results to you with their   recommendations at their soonest availability.  Thank you,  PROVATION

## 2023-02-07 NOTE — ANESTHESIA PREPROCEDURE EVALUATION
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, estrogen receptor positive 2022    Cataract     Depression     Dry eyes     Dry mouth     Duane's syndrome of right eye     Early dry stage nonexudative age-related macular degeneration of both eyes 2022    Fibromyalgia 2014    Fibromyalgia     Fractured hip     RIGHT HIP    GERD (gastroesophageal reflux disease)     History of psychiatric hospitalization     Hyperlipidemia 1992    Hypertension     Hypocalcemia     Hyponatremia     Kidney stone     Migraine headache     Osteoporosis     Psoriatic arthritis 1992    Right knee pain     post knee replacement surgery (possible rejectiion of metal)    RLS (restless legs syndrome)     Schizophrenia 1992    stable on meds    Squamous cell carcinoma of skin     Urinary tract infection      Past Surgical History:   Procedure Laterality Date    brow ptosis repair Right 2019    Surgeon: Dr. Karla Henson    CATARACT EXTRACTION       SECTION      COLONOSCOPY N/A 2016    Procedure: COLONOSCOPY;  Surgeon: ANDRIA Connelly MD;  Location: [address]);  Service: Endoscopy;  Laterality: N/A;    COLONOSCOPY N/A 2022    Procedure: COLONOSCOPY;  Surgeon: Mikey Walters MD;  Location: [facility];  Service: Endoscopy;  Laterality: N/A;    cyst removed from right sinus  1982    HYSTERECTOMY      VAGINAL HYSTERECTOMY WITHOUT BSO - ENDOMETRIOSIS    INJECTION FOR SENTINEL NODE IDENTIFICATION Right 2022    Procedure: INJECTION, FOR SENTINEL NODE IDENTIFICATION-Right;  Surgeon: NEAL Dhillon MD;  Location: [facility];  Service:  General;  Laterality: Right;    INJECTION OF ANESTHETIC AGENT AROUND MEDIAL BRANCH NERVES INNERVATING LUMBAR FACET JOINT N/A 4/11/2019    Procedure: Lumbo-sacral Block, DR5 and Lateral Branches of S1,S2, S3;  Surgeon: Michelle Pineda Jr., MD;  Location: [facility] [place], [place];  Service: Pain Management;  Laterality: N/A;  Pt takes  and states she holds ASA on her own whenever she has procedures.  Instructed to hold x 3 days prior to procedure.      INJECTION OF ANESTHETIC AGENT INTO SACROILIAC JOINT Right 8/30/2018    Procedure: BLOCK, SACROILIAC JOINT-Right- ORAL SEDATION;  Surgeon: Michelle Pineda Jr., MD;  Location: [facility];  Service: Pain Management;  Laterality: Right;    INJECTION OF ANESTHETIC AGENT INTO SACROILIAC JOINT Bilateral 9/27/2018    Procedure: BLOCK, SACROILIAC JOINT-BILATERAL;  Surgeon: Michelle Pineda Jr., MD;  Location: [facility];  Service: Pain Management;  Laterality: Bilateral;  Please keep at 10:00 due to trasnsportation    INJECTION OF ANESTHETIC AGENT INTO SACROILIAC JOINT Bilateral 2/7/2019    Procedure: Bilateral Sacroiliac Joint Injection - Per Dr Pineda, not necessary to hold ASA.;  Surgeon: Michelle Pineda Jr., MD;  Location: [facility];  Service: Pain Management;  Laterality: Bilateral;    INTRAOCULAR PROSTHESES INSERTION Right 8/1/2019    Procedure: INSERTION, IOL PROSTHESIS;  Surgeon: Karen Song MD;  Location: [address];  Service: Ophthalmology;  Laterality: Right;    INTRAOCULAR PROSTHESES INSERTION Left 9/26/2019    Procedure: INSERTION, IOL PROSTHESIS;  Surgeon: Karen Song MD;  Location: [address];  Service: Ophthalmology;  Laterality: Left;    JOINT REPLACEMENT Right     knee    KNEE SURGERY      MASTECTOMY Right 5/9/2022    Procedure: MASTECTOMY-Right;  Surgeon: NEAL Dhillon MD;  Location: [facility];  Service: General;  Laterality: Right;  2.5 HOURS    PHACOEMULSIFICATION OF CATARACT Right 8/1/2019    Procedure:  PHACOEMULSIFICATION, CATARACT;  Surgeon: Karen Song MD;  [facility];  Service: Ophthalmology;  Laterality: Right;    PHACOEMULSIFICATION OF CATARACT Left 9/26/2019    Procedure: PHACOEMULSIFICATION, CATARACT;  Surgeon: Karen Song MD;  [facility];  Service: Ophthalmology;  Laterality: Left;    RADIOFREQUENCY THERMOCOAGULATION Right 5/7/2019    Procedure: RADIOFREQUENCY THERMAL COAGULATION RIGHT DORSAL RAMUS 5 AND LATERAL BRANCH OF S1, S2 AND S3;  Surgeon: Michelel Pineda Jr., MD;  Location: [facility];  Service: Pain Management;  Laterality: Right;    RADIOFREQUENCY THERMOCOAGULATION Left 5/14/2019    Procedure: RADIOFREQUENCY THERMAL COAGULATION LEFT DORSAL RAMUS 5 AND LATERAL BRANCH OF S1,S2 AND S3;  Surgeon: Michelle Pineda Jr., MD;  Location: [facility];  Service: Pain Management;  Laterality: Left;    RADIOFREQUENCY THERMOCOAGULATION Right 10/22/2019    Procedure: RADIOFREQUENCY THERMAL COAGULATION---DARSAL RAMUS 5 and LATERAL S1,S2,and S3 Right;  Surgeon: Michelle Pineda Jr., MD;  Location: [facility];  Service: Pain Management;  Laterality: Right;  patient to sign consent DOS    RADIOFREQUENCY THERMOCOAGULATION Left 10/29/2019    Procedure: RADIOFREQUENCY THERMAL COAGULATION - LEFT - DR5, S1,S2, AND S3;  Surgeon: Michelle Pineda Jr., MD;  Location: [facility];  Service: Pain Management;  Laterality: Left;    RADIOFREQUENCY THERMOCOAGULATION Left 5/26/2020    Procedure: RADIOFREQUENCY THERMAL COAGULATION--Left DR5+ lateral branches of S1, S2, S3;  Surgeon: Michelle Pineda Jr., MD;  Location: [facility];  Service: Pain Management;  Laterality: Left;    RADIOFREQUENCY THERMOCOAGULATION Right 6/2/2020    Procedure: RADIOFREQUENCY THERMAL COAGULATION--Right DR5+ lateral branches of S1, S2, S3;  Surgeon: Michelle Pineda Jr., MD;  Location: [facility];  Service: Pain Management;  Laterality: Right;    SENTINEL LYMPH NODE BIOPSY Right  5/9/2022    Procedure: BIOPSY, LYMPH NODE, SENTINEL-Right;  Surgeon: NEAL Dhillon MD;  Location: [facility];  Service: General;  Laterality: Right;    Surgery on right knee  07/09/1982    tumor removed from back left side upper shoulder  03/17/2006           Pre-op Assessment    I have reviewed the Patient Summary Reports.     I have reviewed the Nursing Notes. I have reviewed the NPO Status.      Review of Systems  Anesthesia Hx:  History of prior surgery of interest to airway management or planning:  Denies Personal Hx of Anesthesia complications.   Social:  Smoker    Hematology/Oncology:         -- Cancer in past history: Breast right surgery    Cardiovascular:   Hypertension    Pulmonary:   Asthma    Renal/:   Chronic Renal Disease    Hepatic/GI:   GERD    Neurological:   Neuromuscular Disease, Headaches    Psych:   Psychiatric History          Physical Exam  General: Well nourished, Cooperative, Alert and Oriented    Airway:  Mallampati: II   Mouth Opening: Normal    Chest/Lungs:  Clear to auscultation, Normal Respiratory Rate    Heart:  Rate: Normal  Rhythm: Regular Rhythm  Sounds: Normal        Anesthesia Plan  Type of Anesthesia, risks & benefits discussed:    Anesthesia Type: Gen Natural Airway  Intra-op Monitoring Plan: Standard ASA Monitors  Induction:  IV  Informed Consent: Informed consent signed with the Patient and all parties understand the risks and agree with anesthesia plan.  All questions answered.   ASA Score: 3  Day of Surgery Review of History & Physical: H&P Update referred to the surgeon/provider.    Ready For Surgery From Anesthesia Perspective.     .

## 2023-02-07 NOTE — H&P
Short Stay Endoscopy History and Physical    PCP - Sadaf Vargas MD     Procedure - EGD  ASA - per anesthesia  Mallampati - per anesthesia  History of Anesthesia problems - no  Family history Anesthesia problems -  no   Plan of anesthesia - General    HPI:  This is a 67 y.o. female here for evaluation of :     Iron def anemia  Referred by pcp.  Feels fatigue and lethargy. No blood in stool. No radiating symptoms. No abd pain, no vomiting. No prior egd. 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    Surgical History:  has a past surgical history that includes Surgery on right knee (1982); cyst removed from right sinus (1982); tumor removed from back left side upper shoulder (2006); Hysterectomy; Knee surgery;  section; Joint replacement (Right); Colonoscopy (N/A, 2016); Injection of anesthetic agent into sacroiliac joint (Right, 2018); Injection of anesthetic agent into sacroiliac joint (Bilateral, 2018); Injection  of anesthetic agent into sacroiliac joint (Bilateral, 2/7/2019); brow ptosis repair (Right, 03/04/2019); Injection of anesthetic agent around medial branch nerves innervating lumbar facet joint (N/A, 4/11/2019); Radiofrequency thermocoagulation (Right, 5/7/2019); Radiofrequency thermocoagulation (Left, 5/14/2019); Phacoemulsification of cataract (Right, 8/1/2019); Intraocular prosthesis insertion (Right, 8/1/2019); Cataract extraction; Phacoemulsification of cataract (Left, 9/26/2019); Intraocular prosthesis insertion (Left, 9/26/2019); Radiofrequency thermocoagulation (Right, 10/22/2019); Radiofrequency thermocoagulation (Left, 10/29/2019); Radiofrequency thermocoagulation (Left, 5/26/2020); Radiofrequency thermocoagulation (Right, 6/2/2020); Mastectomy (Right, 5/9/2022); [place] lymph node biopsy (Right, 5/9/2022); Injection for sentinel node identification (Right, 5/9/2022); and Colonoscopy (N/A, 12/9/2022).    Family History: family history includes Arthritis in her brother; Cancer in her father and mother; Colon cancer in her mother; Depression in her mother; Diabetes in her brother; Heart disease in her brother; No Known Problems in her daughter; Psoriasis in her mother; Stroke in her sister.. Otherwise no colon cancer, inflammatory bowel disease, or GI malignancies.    Social History:  reports that she quit smoking about 4 weeks ago. Her smoking use included cigarettes. She has a 2.50 pack-year smoking history. She has never been exposed to tobacco smoke. She has quit using smokeless tobacco. She reports that she does not drink alcohol and does not use drugs.    Review of patient's allergies indicates:   Allergen Reactions    Etanercept Other (See Comments)     Other reaction(s): recurrent infections    Chloramphenicol sod succinate Hives    Codeine Other (See Comments)     Other reaction(s): Stomach upset. Pt states OK with Percocet    Nickel sutures [surgical stainless steel] Dermatitis     Allergic  contact dermatitis    Adhesive Rash       Medications:   Medications Prior to Admission   Medication Sig Dispense Refill Last Dose    albuterol (PROAIR HFA) 90 mcg/actuation inhaler 2 puffs qid prn 54 g 3 2/6/2023    amLODIPine (NORVASC) 5 MG tablet Take 1 tablet (5 mg total) by mouth every evening. 90 tablet 1 2/6/2023    anastrozole (ARIMIDEX) 1 mg Tab Take 1 tablet (1 mg total) by mouth once daily. 90 tablet 3 2/6/2023    ascorbic acid, vitamin C, (VITAMIN C) 500 MG tablet Take 500 mg by mouth once daily.   2/6/2023    aspirin (ECOTRIN) 81 MG EC tablet Take 81 mg by mouth once daily.   Past Week    atorvastatin (LIPITOR) 80 MG tablet Take 1 tablet (80 mg total) by mouth once daily. 90 tablet 3 2/6/2023    cloNIDine (CATAPRES) 0.1 MG tablet 1 tab po q day for systolic BP > 160 or DBP >100. 30 tablet 1 Past Month    cyanocobalamin 500 MCG tablet Take 500 mcg by mouth once daily.   2/6/2023    ferrous sulfate 325 (65 FE) MG EC tablet Take 1 tablet (325 mg total) by mouth once daily. 1 tablet 0 2/6/2023    folic acid (FOLVITE) 1 MG tablet Take 1 tablet (1,000 mcg total) by mouth once daily. 90 tablet 3 2/6/2023    gabapentin (NEURONTIN) 300 MG capsule Take 300 mg by mouth 3 (three) times daily.   2/6/2023    lisinopriL 10 MG tablet Take 1 tablet (10 mg total) by mouth once daily. 90 tablet 3 2/7/2023    methotrexate 2.5 MG Tab Take 6 tablets (15 mg total) by mouth every 7 days. Take 3 tabs Mon am and 3 tabs Mon pm 24 tablet 2 Past Week    risperiDONE (RISPERDAL) 2 MG tablet TAKE 1 TABLET(2 MG) BY MOUTH EVERY MORNING 90 tablet 0 2/6/2023    risperiDONE (RISPERDAL) 4 MG tablet TAKE 1 TABLET(4 MG) BY MOUTH EVERY EVENING 90 tablet 3 2/6/2023    traMADoL (ULTRAM) 50 mg tablet TAKE 1 TABLET BY MOUTH EVERY 12 HOURS AS NEEDED FOR PAIN 30 tablet 0 2/6/2023    venlafaxine (EFFEXOR-XR) 75 MG 24 hr capsule TAKE 1 CAPSULE(75 MG) BY MOUTH EVERY DAY 30 capsule 0 2/6/2023    VITAMIN A ORAL Take by mouth.   2/6/2023    vitamin D  (VITAMIN D3) 1000 units Tab Take 1,000 Units by mouth once daily.   2023    vitamin E 200 UNIT capsule Take 200 Units by mouth once daily.   2023    zinc gluconate 50 mg tablet Take 50 mg by mouth once daily.   2023    benztropine (COGENTIN) 0.5 MG tablet Take 1 tablet (0.5 mg total) by mouth 2 (two) times daily. (Patient not taking: Reported on 2023) 180 tablet 3     [] fluticasone propionate (FLONASE) 50 mcg/actuation nasal spray 2 sprays (100 mcg total) by Each Nostril route once daily. 16 g 2     isosorbide mononitrate (IMDUR) 30 MG 24 hr tablet TAKE 1 TABLET(30 MG) BY MOUTH EVERY DAY 30 tablet 11     LUTEIN ORAL Take by mouth.       multivitamin (THERAGRAN) per tablet Take 1 tablet by mouth once daily.   More than a month    omega-3 fatty acids/fish oil (FISH OIL-OMEGA-3 FATTY ACIDS) 300-1,000 mg capsule Take by mouth once daily.   More than a month    tofacitinib citrate (XELJANZ ORAL) Take by mouth.   Unknown       Physical Exam:    Vital Signs:   Vitals:    23 1224   BP: (!) 143/63   Pulse: 70   Resp: 18   Temp: 99 °F (37.2 °C)       General Appearance: Well appearing in no acute distress  Eyes:    No scleral icterus  ENT: Neck supple, Lips, mucosa, and tongue normal; teeth and gums normal  Abdomen: Soft, non tender, non distended with normal bowel sounds. No hepatosplenomegaly, ascites, or mass.  Extremities: No edema  Skin: No rash    Labs:  Lab Results   Component Value Date    WBC 6.96 2022    HGB 11.4 (L) 2022    HCT 36.2 (L) 2022     2022    CHOL 169 2022    TRIG 53 2022     (H) 2022    ALT 16 2022    AST 22 2022     2022    K 4.8 2022     2022    CREATININE 0.8 2022    BUN 10 2022    CO2 31 (H) 2022    TSH 2.265 2021    INR 1.0 2014    GLUF 100 2009    HGBA1C 5.3 2018     Open access EGD for iron def anemia.      I have explained the  risks and benefits of endoscopy procedures to the patient including but not limited to bleeding, perforation, infection, and death.  The patient was asked if they understand and allowed to ask any further questions to their satisfaction.      Dougie Shea MD

## 2023-02-07 NOTE — ANESTHESIA RELEASE NOTE
"Anesthesia Release from PACU Note    Patient: Bhavna Landry    Procedure(s) Performed: Procedure(s) (LRB):  EGD (ESOPHAGOGASTRODUODENOSCOPY) (N/A)    Anesthesia type: general    Post pain: Adequate analgesia    Post assessment: no apparent anesthetic complications and tolerated procedure well    Last Vitals:   Visit Vitals  BP (!) 142/58   Pulse 72   Temp 36.6 °C (97.9 °F) (Skin)   Resp (!) 21   Ht 5' 2.5" (1.588 m)   Wt 46.7 kg (103 lb)   SpO2 98%   Breastfeeding No   BMI 18.54 kg/m²       Post vital signs: stable    Level of consciousness: awake, alert  and oriented    Nausea/Vomiting: no nausea/no vomiting    Complications: none    Airway Patency: patent    Respiratory: unassisted, spontaneous ventilation, room air    Cardiovascular: stable and blood pressure at baseline    Hydration: euvolemic  "

## 2023-02-07 NOTE — TRANSFER OF CARE
"Anesthesia Transfer of Care Note    Patient: Bhavna Landry    Procedure(s) Performed: Procedure(s) (LRB):  EGD (ESOPHAGOGASTRODUODENOSCOPY) (N/A)    Patient location: GI    Anesthesia Type: general    Transport from OR: Transported from OR on room air with adequate spontaneous ventilation    Post pain: adequate analgesia    Post assessment: no apparent anesthetic complications    Post vital signs: stable    Level of consciousness: responds to stimulation    Nausea/Vomiting: no nausea/vomiting    Complications: none    Transfer of care protocol was followed      Last vitals:   Visit Vitals  BP (!) 134/62 (BP Location: Left arm, Patient Position: Lying)   Pulse 74   Temp 37.2 °C (99 °F) (Skin)   Resp 20   Ht 5' 2.5" (1.588 m)   Wt 46.7 kg (103 lb)   SpO2 98%   Breastfeeding No   BMI 18.54 kg/m²     "

## 2023-02-08 VITALS
RESPIRATION RATE: 21 BRPM | BODY MASS INDEX: 18.25 KG/M2 | SYSTOLIC BLOOD PRESSURE: 142 MMHG | DIASTOLIC BLOOD PRESSURE: 58 MMHG | HEIGHT: 63 IN | WEIGHT: 103 LBS | OXYGEN SATURATION: 98 % | TEMPERATURE: 98 F | HEART RATE: 72 BPM

## 2023-02-10 ENCOUNTER — HOSPITAL ENCOUNTER (EMERGENCY)
Facility: HOSPITAL | Age: 68
Discharge: HOME | End: 2023-02-10
Attending: STUDENT IN AN ORGANIZED HEALTH CARE EDUCATION/TRAINING PROGRAM
Payer: MEDICARE

## 2023-02-10 VITALS
SYSTOLIC BLOOD PRESSURE: 134 MMHG | BODY MASS INDEX: 18.54 KG/M2 | DIASTOLIC BLOOD PRESSURE: 61 MMHG | HEART RATE: 70 BPM | WEIGHT: 103 LBS | TEMPERATURE: 98 F | RESPIRATION RATE: 20 BRPM | OXYGEN SATURATION: 96 %

## 2023-02-10 DIAGNOSIS — M79.604 RIGHT LEG PAIN: ICD-10-CM

## 2023-02-10 DIAGNOSIS — S80.11XA CONTUSION OF RIGHT LOWER EXTREMITY, INITIAL ENCOUNTER: Primary | ICD-10-CM

## 2023-02-10 DIAGNOSIS — M79.661 RIGHT CALF PAIN: ICD-10-CM

## 2023-02-10 LAB
FINAL PATHOLOGIC DIAGNOSIS: NORMAL
GROSS: NORMAL
Lab: NORMAL
MICROSCOPIC EXAM: NORMAL

## 2023-02-10 PROCEDURE — 99284 EMERGENCY DEPT VISIT MOD MDM: CPT | Mod: 25

## 2023-02-10 RX ORDER — DICLOFENAC SODIUM 10 MG/G
2 GEL TOPICAL DAILY
Qty: 200 G | Refills: 0 | Status: SHIPPED | OUTPATIENT
Start: 2023-02-10 | End: 2024-02-23

## 2023-02-11 NOTE — DISCHARGE INSTRUCTIONS

## 2023-02-11 NOTE — ED NOTES
Present to ED with right leg pain after hitting it on her bed 2 days ago. Bruise noted to right shin. Takes baby ASA daily. Reports feeling like her right knee popped. Hx of right knee replacement. Ambulates with steady gait. Took Tylenol with minimal relief. No distress.    APPEARANCE: Alert, oriented and in no acute distress.  CARDIAC: Normal rate and rhythm, no murmur heard.   RESPIRATORY:Normal rate and effort, breath sounds clear bilaterally throughout chest. Respirations are equal and unlabored no obvious signs of distress.  GASTRO: soft, bowel sounds normal, no tenderness, no abdominal distention.  MUSC: Full ROM. Right leg pain. No obvious deformity.  SKIN: Skin is warm and dry, normal skin turgor, mucous membranes moist. Right shin bruise  NEURO: 5/5 strength major flexors/extensors bilaterally. Sensory intact to light touch bilaterally. Elizabeth coma scale: eyes open spontaneously-4, oriented & converses-5, obeys commands-6. No neurological abnormalities.   MENTAL STATUS: awake, alert and aware of environment.

## 2023-02-12 DIAGNOSIS — D50.0 IRON DEFICIENCY ANEMIA DUE TO CHRONIC BLOOD LOSS: Primary | ICD-10-CM

## 2023-02-12 RX ORDER — PANTOPRAZOLE SODIUM 40 MG/1
40 TABLET, DELAYED RELEASE ORAL DAILY
Qty: 30 TABLET | Refills: 11 | Status: SHIPPED | OUTPATIENT
Start: 2023-02-12 | End: 2023-03-14

## 2023-02-14 ENCOUNTER — LAB VISIT (OUTPATIENT)
Dept: LAB | Facility: HOSPITAL | Age: 68
End: 2023-02-14
Attending: INTERNAL MEDICINE
Payer: MEDICARE

## 2023-02-14 DIAGNOSIS — M81.0 OSTEOPOROSIS, UNSPECIFIED OSTEOPOROSIS TYPE, UNSPECIFIED PATHOLOGICAL FRACTURE PRESENCE: ICD-10-CM

## 2023-02-14 DIAGNOSIS — L40.50 PSA (PSORIATIC ARTHRITIS): ICD-10-CM

## 2023-02-14 DIAGNOSIS — E78.5 HYPERLIPIDEMIA, UNSPECIFIED HYPERLIPIDEMIA TYPE: ICD-10-CM

## 2023-02-14 LAB
25(OH)D3+25(OH)D2 SERPL-MCNC: 60 NG/ML (ref 30–96)
ALBUMIN SERPL BCP-MCNC: 3.7 G/DL (ref 3.5–5.2)
ALP SERPL-CCNC: 71 U/L (ref 55–135)
ALT SERPL W/O P-5'-P-CCNC: 19 U/L (ref 10–44)
ANION GAP SERPL CALC-SCNC: 6 MMOL/L (ref 8–16)
AST SERPL-CCNC: 32 U/L (ref 10–40)
BASOPHILS # BLD AUTO: 0.06 K/UL (ref 0–0.2)
BASOPHILS NFR BLD: 0.8 % (ref 0–1.9)
BILIRUB SERPL-MCNC: 0.3 MG/DL (ref 0.1–1)
BUN SERPL-MCNC: 6 MG/DL (ref 8–23)
CALCIUM SERPL-MCNC: 9.1 MG/DL (ref 8.7–10.5)
CHLORIDE SERPL-SCNC: 102 MMOL/L (ref 95–110)
CHOLEST SERPL-MCNC: 153 MG/DL (ref 120–199)
CHOLEST/HDLC SERPL: 1.7 {RATIO} (ref 2–5)
CO2 SERPL-SCNC: 28 MMOL/L (ref 23–29)
CREAT SERPL-MCNC: 0.8 MG/DL (ref 0.5–1.4)
CRP SERPL-MCNC: <0.3 MG/L (ref 0–8.2)
DIFFERENTIAL METHOD: ABNORMAL
EOSINOPHIL # BLD AUTO: 0.3 K/UL (ref 0–0.5)
EOSINOPHIL NFR BLD: 3.7 % (ref 0–8)
ERYTHROCYTE [DISTWIDTH] IN BLOOD BY AUTOMATED COUNT: 19.6 % (ref 11.5–14.5)
ERYTHROCYTE [SEDIMENTATION RATE] IN BLOOD BY PHOTOMETRIC METHOD: 5 MM/HR (ref 0–36)
EST. GFR  (NO RACE VARIABLE): >60 ML/MIN/1.73 M^2
GLUCOSE SERPL-MCNC: 94 MG/DL (ref 70–110)
HCT VFR BLD AUTO: 40.4 % (ref 37–48.5)
HDLC SERPL-MCNC: 90 MG/DL (ref 40–75)
HDLC SERPL: 58.8 % (ref 20–50)
HGB BLD-MCNC: 12.6 G/DL (ref 12–16)
IMM GRANULOCYTES # BLD AUTO: 0.01 K/UL (ref 0–0.04)
IMM GRANULOCYTES NFR BLD AUTO: 0.1 % (ref 0–0.5)
LDLC SERPL CALC-MCNC: 51.4 MG/DL (ref 63–159)
LYMPHOCYTES # BLD AUTO: 2 K/UL (ref 1–4.8)
LYMPHOCYTES NFR BLD: 26.5 % (ref 18–48)
MCH RBC QN AUTO: 28.6 PG (ref 27–31)
MCHC RBC AUTO-ENTMCNC: 31.2 G/DL (ref 32–36)
MCV RBC AUTO: 92 FL (ref 82–98)
MONOCYTES # BLD AUTO: 0.6 K/UL (ref 0.3–1)
MONOCYTES NFR BLD: 8.4 % (ref 4–15)
NEUTROPHILS # BLD AUTO: 4.5 K/UL (ref 1.8–7.7)
NEUTROPHILS NFR BLD: 60.5 % (ref 38–73)
NONHDLC SERPL-MCNC: 63 MG/DL
NRBC BLD-RTO: 0 /100 WBC
PLATELET # BLD AUTO: 315 K/UL (ref 150–450)
PMV BLD AUTO: 8.9 FL (ref 9.2–12.9)
POTASSIUM SERPL-SCNC: 4.5 MMOL/L (ref 3.5–5.1)
PROT SERPL-MCNC: 6.2 G/DL (ref 6–8.4)
RBC # BLD AUTO: 4.4 M/UL (ref 4–5.4)
SODIUM SERPL-SCNC: 136 MMOL/L (ref 136–145)
TRIGL SERPL-MCNC: 58 MG/DL (ref 30–150)
WBC # BLD AUTO: 7.48 K/UL (ref 3.9–12.7)

## 2023-02-14 PROCEDURE — 85025 COMPLETE CBC W/AUTO DIFF WBC: CPT | Performed by: INTERNAL MEDICINE

## 2023-02-14 PROCEDURE — 80053 COMPREHEN METABOLIC PANEL: CPT | Performed by: INTERNAL MEDICINE

## 2023-02-14 PROCEDURE — 36415 COLL VENOUS BLD VENIPUNCTURE: CPT | Mod: PO | Performed by: INTERNAL MEDICINE

## 2023-02-14 PROCEDURE — 86140 C-REACTIVE PROTEIN: CPT | Performed by: INTERNAL MEDICINE

## 2023-02-14 PROCEDURE — 85652 RBC SED RATE AUTOMATED: CPT | Performed by: INTERNAL MEDICINE

## 2023-02-14 PROCEDURE — 82306 VITAMIN D 25 HYDROXY: CPT | Performed by: INTERNAL MEDICINE

## 2023-02-14 PROCEDURE — 80061 LIPID PANEL: CPT | Performed by: INTERNAL MEDICINE

## 2023-02-15 ENCOUNTER — TELEPHONE (OUTPATIENT)
Dept: OTOLARYNGOLOGY | Facility: CLINIC | Age: 68
End: 2023-02-15
Payer: MEDICARE

## 2023-02-15 NOTE — TELEPHONE ENCOUNTER
I tried to call, left VM.  Results on fungal culture are still pending, final result will come after 4 weeks.

## 2023-02-15 NOTE — TELEPHONE ENCOUNTER
----- Message from Charlotte Hood LPN sent at 2/14/2023 11:10 AM CST -----  Regarding: FW: Results  Contact: [phone]    ----- Message -----  From: Hansa Crowe  Sent: 2/14/2023  10:45 AM CST  To: [facility] Staff  Subject: Results                                          Pt is calling requesting a call back to discuss her test results.  States it's been 3 weeks.  Please call.

## 2023-02-22 LAB — FUNGUS SPEC CULT: NORMAL

## 2023-02-24 ENCOUNTER — TELEPHONE (OUTPATIENT)
Dept: OTOLARYNGOLOGY | Facility: CLINIC | Age: 68
End: 2023-02-24
Payer: MEDICARE

## 2023-02-24 ENCOUNTER — OFFICE VISIT (OUTPATIENT)
Dept: PSYCHIATRY | Facility: CLINIC | Age: 68
End: 2023-02-24
Payer: MEDICARE

## 2023-02-24 ENCOUNTER — PATIENT MESSAGE (OUTPATIENT)
Dept: OTOLARYNGOLOGY | Facility: CLINIC | Age: 68
End: 2023-02-24
Payer: MEDICARE

## 2023-02-24 DIAGNOSIS — G25.81 RESTLESS LEGS: ICD-10-CM

## 2023-02-24 DIAGNOSIS — G25.9 EXTRAPYRAMIDAL SYNDROME: ICD-10-CM

## 2023-02-24 DIAGNOSIS — Z17.0 CARCINOMA OF UPPER-OUTER QUADRANT OF RIGHT BREAST IN FEMALE, ESTROGEN RECEPTOR POSITIVE: ICD-10-CM

## 2023-02-24 DIAGNOSIS — F20.0 PARANOID SCHIZOPHRENIA: ICD-10-CM

## 2023-02-24 DIAGNOSIS — F41.9 ANXIETY: Primary | ICD-10-CM

## 2023-02-24 DIAGNOSIS — C50.411 CARCINOMA OF UPPER-OUTER QUADRANT OF RIGHT BREAST IN FEMALE, ESTROGEN RECEPTOR POSITIVE: ICD-10-CM

## 2023-02-24 DIAGNOSIS — E87.1 HYPONATREMIA: ICD-10-CM

## 2023-02-24 DIAGNOSIS — G47.00 INSOMNIA, UNSPECIFIED TYPE: ICD-10-CM

## 2023-02-24 DIAGNOSIS — F33.42 RECURRENT MAJOR DEPRESSIVE DISORDER, IN FULL REMISSION: ICD-10-CM

## 2023-02-24 DIAGNOSIS — R29.818 EXTRAPYRAMIDAL SYMPTOM: ICD-10-CM

## 2023-02-24 PROCEDURE — 4010F PR ACE/ARB THEARPY RXD/TAKEN: ICD-10-PCS | Mod: CPTII,95,, | Performed by: INTERNAL MEDICINE

## 2023-02-24 PROCEDURE — 1159F PR MEDICATION LIST DOCUMENTED IN MEDICAL RECORD: ICD-10-PCS | Mod: CPTII,95,, | Performed by: INTERNAL MEDICINE

## 2023-02-24 PROCEDURE — 99214 PR OFFICE/OUTPT VISIT, EST, LEVL IV, 30-39 MIN: ICD-10-PCS | Mod: 95,,, | Performed by: INTERNAL MEDICINE

## 2023-02-24 PROCEDURE — 1160F PR REVIEW ALL MEDS BY PRESCRIBER/CLIN PHARMACIST DOCUMENTED: ICD-10-PCS | Mod: CPTII,95,, | Performed by: INTERNAL MEDICINE

## 2023-02-24 PROCEDURE — 1160F RVW MEDS BY RX/DR IN RCRD: CPT | Mod: CPTII,95,, | Performed by: INTERNAL MEDICINE

## 2023-02-24 PROCEDURE — 4010F ACE/ARB THERAPY RXD/TAKEN: CPT | Mod: CPTII,95,, | Performed by: INTERNAL MEDICINE

## 2023-02-24 PROCEDURE — 1159F MED LIST DOCD IN RCRD: CPT | Mod: CPTII,95,, | Performed by: INTERNAL MEDICINE

## 2023-02-24 PROCEDURE — 99214 OFFICE O/P EST MOD 30 MIN: CPT | Mod: 95,,, | Performed by: INTERNAL MEDICINE

## 2023-02-24 RX ORDER — GABAPENTIN 100 MG/1
100 CAPSULE ORAL 2 TIMES DAILY PRN
Qty: 180 CAPSULE | Refills: 3 | Status: SHIPPED | OUTPATIENT
Start: 2023-02-24 | End: 2024-03-24

## 2023-02-24 RX ORDER — BENZTROPINE MESYLATE 0.5 MG/1
0.5 TABLET ORAL 2 TIMES DAILY
Qty: 180 TABLET | Refills: 3 | Status: SHIPPED | OUTPATIENT
Start: 2023-02-24 | End: 2024-02-26

## 2023-02-24 RX ORDER — RISPERIDONE 2 MG/1
TABLET ORAL
Qty: 90 TABLET | Refills: 3 | Status: SHIPPED | OUTPATIENT
Start: 2023-02-24 | End: 2024-02-19

## 2023-02-24 RX ORDER — GABAPENTIN 300 MG/1
300 CAPSULE ORAL NIGHTLY
Qty: 90 CAPSULE | Refills: 3 | Status: SHIPPED | OUTPATIENT
Start: 2023-02-24 | End: 2024-02-23

## 2023-02-24 RX ORDER — RISPERIDONE 4 MG/1
4 TABLET ORAL NIGHTLY
Qty: 90 TABLET | Refills: 3 | Status: SHIPPED | OUTPATIENT
Start: 2023-02-24 | End: 2024-03-07

## 2023-02-24 RX ORDER — VENLAFAXINE HYDROCHLORIDE 75 MG/1
75 CAPSULE, EXTENDED RELEASE ORAL DAILY
Qty: 90 CAPSULE | Refills: 3 | Status: SHIPPED | OUTPATIENT
Start: 2023-02-24 | End: 2023-07-20 | Stop reason: SDUPTHER

## 2023-02-24 NOTE — PROGRESS NOTES
OUTPATIENT PSYCHIATRY RETURN VISIT    ENCOUNTER DATE:  2/24/2023  SITE:  [facility], [facility]  LENGTH OF SESSION:  25 minutes    The patient location is:  [place], not in healthcare facility  Visit type:  audiovisual     Face to Face time with patient:  25 minutes  30 minutes of total time spent on the encounter, which includes face to face time and non-face to face time preparing to see the patient (eg, review of tests), Obtaining and/or reviewing separately obtained history, Documenting clinical information in the electronic or other health record, Independently interpreting results (not separately reported) and communicating results to the patient/family/caregiver, or Care coordination (not separately reported).     Each patient to whom he or she provides medical services by telemedicine is:  (1) informed of the relationship between the physician and patient and the respective role of any other health care provider with respect to management of the patient; and (2) notified that he or she may decline to receive medical services by telemedicine and may withdraw from such care at any time.    CHIEF COMPLAINT:  Mood and Anxiety      HISTORY OF PRESENTING ILLNESS:  Bhavna Landry is a 67 y.o. female with history of Paranoid Schizophrenia, Depression, and Insomnia who presents for follow up appointment.  Patient is accompanied by her daughter today.    Plan at last appointment on 2/14/2022:  Symptoms currently stable.  Continue Risperdal 2mg/4mg, Effexor 75mg daily, Neurontin 300mg qHS, and Cogentin 0.5mg BID.  Hyponatremia is being managed by Nephrology.  Discussed with patient and daughter informed consent, risks versus benefits, alternative treatments, side effect profile and the inherent unpredictability of individual responses to these treatments.  The patient's daughter expresses understanding of the above and displays the capacity to agree with this current plan.    History as told by  patient:  Had right sided mastectomy.  Takes estrogen for 5 years.  Has been having pains down back and sides.  Goes to Oncologist every 3 months.  Went to ER on 2/10 because leg was killing her x 3 weeks.  Knee hurts - has chino going into her shin.  Was told it would last 10 years and has been 10 years.  Was giving daughter $500 a month to buy food.  But insurance on house went up $100 so she said to give her $400.  Takes grandson to [employer] 3 days a week.  Takes granddaughter to work 5-7 days per week.  And grandson to karate.  But doesn't get money for gas for all of these things.  11 y/o grandson, 15 y/o grandson, and 18 y/o grand-daughter.  Says she is paying all the bills and house note.  But when  is cooking, they don't offer her food - patient says she is looking out for granddaughter who is in charge of her own food.  Patient says she needs something to calm her down.  Lashed out at grand-daughter and that is unlike her (yelled, not physical).  Wants something that helps her not dwell on things.  Grand-daughter is non-binary.  No longer taking Effexor - ran out.  Still taking Risperdal, Neurontin, Cogentin.  Denies depression, AVH, or paranoia.  Very tired in the evening - running around places constantly.  Has not been exercising like she used to - used to go to gym but friend she went with is caring for her mother.  Sleeping well.  No RLS on Neurontin.    Medication side effects:  Denies  Medication compliance:  Not taking Effexor    PSYCHIATRIC REVIEW OF SYSTEMS:  Trouble with sleep:  Denies   Appetite changes:  Denies  Weight changes:  5 pound loss  Lack of energy:  Yes  Anhedonia:  Denies  Somatic symptoms:  Denies  Libido:  Not discussed  Anxiety/panic:  Yes, worse recently  Guilty/hopeless:  Denies  Self-injurious behavior/risky behavior:  Denies  Any drugs:  Denies  Alcohol:  Denies    MEDICAL REVIEW OF SYSTEMS:  Complete review of systems performed covering Constitutional, Musculoskeletal,  Neurologic.  All systems negative except for that covered in HPI.    PAST PSYCHIATRIC, MEDICAL, AND SOCIAL HISTORY REVIEWED  The patient's past medical, family and social history have been reviewed and updated as appropriate within the electronic medical record - see encounter notes.    MEDICATIONS:    Current Outpatient Medications:     aspirin (ECOTRIN) 81 MG EC tablet, Take 81 mg by mouth once daily., Disp: , Rfl:     atorvastatin (LIPITOR) 80 MG tablet, Take 1 tablet (80 mg total) by mouth once daily., Disp: 90 tablet, Rfl: 3    albuterol (PROAIR HFA) 90 mcg/actuation inhaler, 2 puffs qid prn, Disp: 54 g, Rfl: 3    amLODIPine (NORVASC) 5 MG tablet, Take 1 tablet (5 mg total) by mouth every evening., Disp: 90 tablet, Rfl: 1    anastrozole (ARIMIDEX) 1 mg Tab, Take 1 tablet (1 mg total) by mouth once daily., Disp: 90 tablet, Rfl: 3    ascorbic acid, vitamin C, (VITAMIN C) 500 MG tablet, Take 500 mg by mouth once daily., Disp: , Rfl:     benztropine (COGENTIN) 0.5 MG tablet, Take 1 tablet (0.5 mg total) by mouth 2 (two) times daily., Disp: 180 tablet, Rfl: 3    cloNIDine (CATAPRES) 0.1 MG tablet, 1 tab po q day for systolic BP > 160 or DBP >100., Disp: 30 tablet, Rfl: 1    cyanocobalamin 500 MCG tablet, Take 500 mcg by mouth once daily., Disp: , Rfl:     diclofenac sodium (VOLTAREN) 1 % Gel, Apply 2 g topically once daily., Disp: 200 g, Rfl: 0    ferrous sulfate 325 (65 FE) MG EC tablet, Take 1 tablet (325 mg total) by mouth once daily., Disp: 1 tablet, Rfl: 0    folic acid (FOLVITE) 1 MG tablet, Take 1 tablet (1,000 mcg total) by mouth once daily., Disp: 90 tablet, Rfl: 3    gabapentin (NEURONTIN) 100 MG capsule, Take 1 capsule (100 mg total) by mouth 2 (two) times daily as needed (anxiety)., Disp: 180 capsule, Rfl: 3    gabapentin (NEURONTIN) 300 MG capsule, Take 1 capsule (300 mg total) by mouth every evening., Disp: 90 capsule, Rfl: 3    isosorbide mononitrate (IMDUR) 30 MG 24 hr tablet, TAKE 1 TABLET(30 MG) BY  MOUTH EVERY DAY, Disp: 30 tablet, Rfl: 11    lisinopriL 10 MG tablet, Take 1 tablet (10 mg total) by mouth once daily., Disp: 90 tablet, Rfl: 3    LUTEIN ORAL, Take by mouth., Disp: , Rfl:     methotrexate 2.5 MG Tab, Take 6 tablets (15 mg total) by mouth every 7 days. Take 3 tabs Mon am and 3 tabs Mon pm, Disp: 24 tablet, Rfl: 2    multivitamin (THERAGRAN) per tablet, Take 1 tablet by mouth once daily., Disp: , Rfl:     omega-3 fatty acids/fish oil (FISH OIL-OMEGA-3 FATTY ACIDS) 300-1,000 mg capsule, Take by mouth once daily., Disp: , Rfl:     pantoprazole (PROTONIX) 40 MG tablet, Take 1 tablet (40 mg total) by mouth once daily., Disp: 30 tablet, Rfl: 11    risperiDONE (RISPERDAL) 2 MG tablet, TAKE 1 TABLET(2 MG) BY MOUTH EVERY MORNING, Disp: 90 tablet, Rfl: 3    risperiDONE (RISPERDAL) 4 MG tablet, Take 1 tablet (4 mg total) by mouth every evening., Disp: 90 tablet, Rfl: 3    tofacitinib citrate (XELJANZ ORAL), Take by mouth., Disp: , Rfl:     traMADoL (ULTRAM) 50 mg tablet, TAKE 1 TABLET BY MOUTH EVERY 12 HOURS AS NEEDED FOR PAIN, Disp: 30 tablet, Rfl: 0    venlafaxine (EFFEXOR-XR) 75 MG 24 hr capsule, Take 1 capsule (75 mg total) by mouth once daily., Disp: 90 capsule, Rfl: 3    VITAMIN A ORAL, Take by mouth., Disp: , Rfl:     vitamin D (VITAMIN D3) 1000 units Tab, Take 1,000 Units by mouth once daily., Disp: , Rfl:     vitamin E 200 UNIT capsule, Take 200 Units by mouth once daily., Disp: , Rfl:     zinc gluconate 50 mg tablet, Take 50 mg by mouth once daily., Disp: , Rfl:   No current facility-administered medications for this visit.    Facility-Administered Medications Ordered in Other Visits:     tropicamide 1% ophthalmic solution 1 drop, 1 drop, Right Eye, On Call Procedure, Karen Song MD, 1 drop at 08/01/19 0625    ALLERGIES:  Review of patient's allergies indicates:   Allergen Reactions    Etanercept Other (See Comments)     Other reaction(s): recurrent infections    Chloramphenicol sod succinate  "Hives    Codeine Other (See Comments)     Other reaction(s): Stomach upset. Pt states OK with Percocet    Nickel sutures [surgical stainless steel] Dermatitis     Allergic contact dermatitis    Adhesive Rash       PSYCHIATRIC EXAM:  There were no vitals filed for this visit.  Appearance:  Well groomed, appearing healthy and of stated age  Behavior:  Cooperative, pleasant, no psychomotor agitation or retardation  Speech:  Normal rate, rhythm, prosody, and volume  Mood:  "Anxious"  Affect:  Euthymic  Thought Process:  Linear, logical, goal directed  Thought Content:  Negative for suicidal ideation, homicidal ideation, delusions or hallucinations.  No paranoia.    Associations:  Intact  Memory:  Fair  Level of Consciousness/Orientation:  Grossly intact  Fund of Knowledge:  Fair  Attention:  Good  Language:  Fluent, able to name abstract and concrete objects  Insight:  Fair  Judgment:  Intact  Psychomotor signs:  Mild ivet buccal movements, no tremor  Gait:  Unable to assess via virtual visit      RELEVANT LABS/STUDIES:    [facility] completed 12/13/21    Lab Results   Component Value Date    WBC 7.48 02/14/2023    HGB 12.6 02/14/2023    HCT 40.4 02/14/2023    MCV 92 02/14/2023     02/14/2023     BMP  Lab Results   Component Value Date     02/14/2023    K 4.5 02/14/2023     02/14/2023    CO2 28 02/14/2023    BUN 6 (L) 02/14/2023    CREATININE 0.8 02/14/2023    CALCIUM 9.1 02/14/2023    ANIONGAP 6 (L) 02/14/2023    ESTGFRAFRICA >60.0 04/13/2022    EGFRNONAA >60.0 04/13/2022     Lab Results   Component Value Date    ALT 19 02/14/2023    AST 32 02/14/2023    GGT 91 (H) 07/28/2014    ALKPHOS 71 02/14/2023    BILITOT 0.3 02/14/2023     Lab Results   Component Value Date    TSH 2.265 03/30/2021     Lab Results   Component Value Date    HGBA1C 5.3 12/11/2018       IMPRESSION:    Bhavna Landry is a 67 y.o. female with history of Paranoid Schizophrenia, Depression, and Insomnia who presents for follow up " appointment.    Status/Progress:  Based on the examination today, the patient's problem(s) is/are inadequately controlled.  New problems have been presented today.    Risk Parameters:  Patient reports no suicidal ideation  Patient reports no homicidal ideation  Patient reports no self-injurious behavior  Patient reports no violent behavior      DIAGNOSES:    ICD-10-CM ICD-9-CM   1. Anxiety  F41.9 300.00   2. Paranoid schizophrenia  F20.0 295.30   3. Extrapyramidal symptom  R29.818 781.99   4. Insomnia, unspecified type  G47.00 780.52   5. Recurrent major depressive disorder, in full remission  F33.42 296.36   6. 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, estrogen receptor positive  C50.411 174.4    Z17.0 V86.0   7. Extrapyramidal syndrome  G25.9 333.90   8. Restless legs  G25.81 333.94   9. History of hyponatremia  E87.1 276.1         PLAN:  Restart Effexor 75mg daily for anxiety.  Check sodium in 1 month (already scheduled by Oncology).  If sodium is normal and anxiety still not well controlled, could consider increasing back to 150mg daily.    Continue Risperdal 2mg/4mg, Neurontin 300mg qHS, and Cogentin 0.5mg BID.  She also wants to restart Neurontin 100mg PRN during the day for anxiety.  Discussed with patient and daughter informed consent, risks versus benefits, alternative treatments, side effect profile and the inherent unpredictability of individual responses to these treatments.  The patient's daughter expresses understanding of the above and displays the capacity to agree with this current plan.    RETURN TO CLINIC:  Follow up in about 2 months (around 4/24/2023).

## 2023-02-24 NOTE — TELEPHONE ENCOUNTER
"Spoke with patient in regard to fungal culture taken 1 month ago by . Patient was inquiring about the result. I let her know  tried contacting her on 2/15 at that time his message stated the culture was still pending and will have a result in 4 weeks.     I took a look at the result through my chart and it stated "Final Result" I read that it mentioned no fungus isolated after 4 weeks.     I let her know  will follow up once he return to office.     Patient thanked me for the call and will wait for a response by .  "

## 2023-03-01 NOTE — PROGRESS NOTES
Subjective:      Patient ID: Bhavna Landry is a 67 y.o. female.    Chief Complaint: Low-back Pain, Mid-back Pain, and Back Pain    Ms Landry is a 68 yo female sent in consultation by Dr. Vargas for evaluation of low back pain.  She was last seen by pain in 2020 with complete relief from left then right  DR 5+ lateral branches of the S1, S2 and S3 RFA 6/2020 and then a left trochanteric bursa injection 10/2020.  She has been having low back pain for the past year.  The pain is lower back and into both sides of the glute.  The pain is also in the midline upper lumbar spine.  She does not recall the rfa.  The pain is walking, sitting, and getting up from sitting and getting out of bed.  The pain is a throbbing pain.  The pain is 8/10 now, worst 10/10 sitting or walking too long, best 4/10 lying down.  She has not done PT recently and did not continue the HEP.  She did go to chiropractor.      MRI hip 11/23/2022  The left hip joint appears within normal limits.  There is no joint effusion.  There is normal femoral head contour.  There is no osseous edema, no geodes, no fracture, no avascular necrosis.  The left acetabular region appears normal as well.  No advanced labral pathology.  The superior and inferior left pubic rami and left ischial tuberosity appear normal.     The bilateral sacroiliac joints demonstrate no significant inflammatory change.     Similar subtle area of signal change at the anterior central acetabulum on the right when compared to the prior exam, stability over time suggests small area of geode  or degenerative change.     Large field-of-view images of the right hip joint demonstrate nothing unusual.     There is significant adjacent endplate edema, on the left at L3-4 and to a lesser extent L4-5 consistent with active degenerative disc disease.     The bilateral ischiofemoral interval appear normal.     The intrapelvic content appears within normal limits.     The uterus is removed.     No  pelvic fluid.     The subcutaneous soft tissues and musculature appear normal including the soft tissues adjacent to the bilateral trochanteric region, no evidence of trochanteric bursitis.     Impression:     No significant abnormality of the left hip joint.     Significant degenerative disc disease at the lower lumbar spine more specifically adjacent endplate edema on the left at L3-4 and L4-5 suggestive of active degenerative change.    X-ray lumbar 2020  Convex left curvature of the thoracolumbar spine again identified.  There is grade 1 retrolisthesis of L3 on L4.  Allowing for limitation by overlapping endplates the lumbar vertebral body heights and contours are relatively stable without evidence for acute fracture.  Further evaluation as warranted clinically.     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  9/20/2022: Early dry stage nonexudative age-related macular   degeneration of both eyes  4/17/2014: Fibromyalgia  No date: Fibromyalgia  No date: Fractured hip      Comment:  RIGHT HIP  No date: GERD (gastroesophageal reflux disease)  No date: History of psychiatric hospitalization  02/02/1992: Hyperlipidemia  No date: Hypertension  No date: Hypocalcemia  No date: Hyponatremia  No date: Kidney stone  No date: Migraine headache  No date: Osteoporosis  02/02/1992: Psoriatic arthritis  No date: Right knee pain      Comment:  post knee replacement surgery (possible rejectiion of                metal)  No date: RLS (restless legs syndrome)  02/02/1992: Schizophrenia      Comment:  stable on meds  No date: Squamous cell carcinoma of skin  No date: Urinary tract  infection    Past Surgical History:  2019: brow ptosis repair; Right      Comment:  Surgeon: Dr. Karla Henson  No date: CATARACT EXTRACTION  No date:  SECTION  2016: COLONOSCOPY; N/A      Comment:  Procedure: COLONOSCOPY;  Surgeon: ANDRIA Connelly MD;                 Location: [facility] ([address] [place] [address]);  Service: Endoscopy;                 Laterality: N/A;  2022: COLONOSCOPY; N/A      Comment:  Procedure: COLONOSCOPY;  Surgeon: Mikey Walters MD;                 Location: [facility];  Service: Endoscopy;  Laterality:                N/A;  1982: cyst removed from right sinus  2023: ESOPHAGOGASTRODUODENOSCOPY; N/A      Comment:  Procedure: EGD (ESOPHAGOGASTRODUODENOSCOPY);  Surgeon:                Dougie Shea MD;  Location: [facility];  Service:                Endoscopy;  Laterality: N/A;  No date: HYSTERECTOMY      Comment:  VAGINAL HYSTERECTOMY WITHOUT BSO - ENDOMETRIOSIS  2022: INJECTION FOR SENTINEL NODE IDENTIFICATION; Right      Comment:  Procedure: INJECTION, FOR SENTINEL NODE                IDENTIFICATION-Right;  Surgeon: NEAL Dhillon MD;                 Location: [facility];  Service: General;  Laterality: Right;  2019: INJECTION OF ANESTHETIC AGENT AROUND MEDIAL BRANCH NERVES   INNERVATING LUMBAR FACET JOINT; N/A      Comment:  Procedure: Lumbo-sacral Block, DR5 and Lateral Branches                of S1,S2, S3;  Surgeon: Michelle Pineda Jr., MD;                 Location: [facility];  Service: Pain Management;                 Laterality: N/A;  Pt takes  and states she holds                ASA on her own whenever she has procedures.  Instructed                to hold x 3 days prior to procedure.    2018: INJECTION OF ANESTHETIC AGENT INTO SACROILIAC JOINT; Right      Comment:  Procedure: BLOCK, SACROILIAC JOINT-Right- ORAL SEDATION;               Surgeon: Michelle Pineda Jr., MD;  Location: [facility]                [facility];  Service: Pain Management;   Laterality: Right;  9/27/2018: INJECTION OF ANESTHETIC AGENT INTO SACROILIAC JOINT;   Bilateral      Comment:  Procedure: BLOCK, SACROILIAC JOINT-BILATERAL;  Surgeon:                Michelle Pineda Jr., MD;  Location: [facility];                 Service: Pain Management;  Laterality: Bilateral;  Please               keep at 10:00 due to trasnsportation  2/7/2019: INJECTION OF ANESTHETIC AGENT INTO SACROILIAC JOINT;   Bilateral      Comment:  Procedure: Bilateral Sacroiliac Joint Injection - Per Dr Pineda, not necessary to hold ASA.;  Surgeon: Michelle Pineda Jr., MD;  Location: [facility];  Service:                Pain Management;  Laterality: Bilateral;  8/1/2019: INTRAOCULAR PROSTHESES INSERTION; Right      Comment:  Procedure: INSERTION, IOL PROSTHESIS;  Surgeon: Karen Song MD;  Location: [address];  Service:                Ophthalmology;  Laterality: Right;  9/26/2019: INTRAOCULAR PROSTHESES INSERTION; Left      Comment:  Procedure: INSERTION, IOL PROSTHESIS;  Surgeon: Karen Song MD;  Location: [address];  Service:                Ophthalmology;  Laterality: Left;  No date: JOINT REPLACEMENT; Right      Comment:  knee  No date: KNEE SURGERY  5/9/2022: MASTECTOMY; Right      Comment:  Procedure: MASTECTOMY-Right;  Surgeon: NEAL Dhillon MD;  Location: [facility];  Service: General;  Laterality:                Right;  2.5 HOURS  8/1/2019: PHACOEMULSIFICATION OF CATARACT; Right      Comment:  Procedure: PHACOEMULSIFICATION, CATARACT;  Surgeon:                Karen Song MD;  Location: [address];                 Service: Ophthalmology;  Laterality: Right;  9/26/2019: PHACOEMULSIFICATION OF CATARACT; Left      Comment:  Procedure: PHACOEMULSIFICATION, CATARACT;  Surgeon:                Karen Song MD;  Location: [address];                 Service: Ophthalmology;  Laterality:  Left;  5/7/2019: RADIOFREQUENCY THERMOCOAGULATION; Right      Comment:  Procedure: RADIOFREQUENCY THERMAL COAGULATION RIGHT                DORSAL RAMUS 5 AND LATERAL BRANCH OF S1, S2 AND S3;                 Surgeon: Michelle Pineda Jr., MD;  Location: [facility];  Service: Pain Management;  Laterality: Right;  5/14/2019: RADIOFREQUENCY THERMOCOAGULATION; Left      Comment:  Procedure: RADIOFREQUENCY THERMAL COAGULATION LEFT                DORSAL RAMUS 5 AND LATERAL BRANCH OF S1,S2 AND S3;                 Surgeon: Michelle Pineda Jr., MD;  Location: [facility];  Service: Pain Management;  Laterality: Left;  10/22/2019: RADIOFREQUENCY THERMOCOAGULATION; Right      Comment:  Procedure: RADIOFREQUENCY THERMAL COAGULATION---DARSAL                RAMUS 5 and LATERAL S1,S2,and S3 Right;  Surgeon: Michelle Pineda Jr., MD;  Location: [facility];  Service:                Pain Management;  Laterality: Right;  patient to sign                consent DOS  10/29/2019: RADIOFREQUENCY THERMOCOAGULATION; Left      Comment:  Procedure: RADIOFREQUENCY THERMAL COAGULATION - LEFT -                DR5, S1,S2, AND S3;  Surgeon: Michelle Pineda Jr., MD;                Location: [facility];  Service: Pain Management;                 Laterality: Left;  5/26/2020: RADIOFREQUENCY THERMOCOAGULATION; Left      Comment:  Procedure: RADIOFREQUENCY THERMAL COAGULATION--Left DR5+               lateral branches of S1, S2, S3;  Surgeon: Michelle Pineda Jr., MD;  Location: [facility];  Service:                Pain Management;  Laterality: Left;  6/2/2020: RADIOFREQUENCY THERMOCOAGULATION; Right      Comment:  Procedure: RADIOFREQUENCY THERMAL COAGULATION--Right                DR5+ lateral branches of S1, S2, S3;  Surgeon: Michelle Pineda Jr., MD;  Location: [facility];  Service:                Pain Management;  Laterality: Right;  5/9/2022: SENTINEL  LYMPH NODE BIOPSY; Right      Comment:  Procedure: BIOPSY, LYMPH NODE, SENTINEL-Right;  Surgeon:               NEAL Dhillon MD;  Location: [facility];  Service:                General;  Laterality: Right;  1982: Surgery on right knee  2006: tumor removed from back left side upper shoulder    Review of patient's family history indicates:      Social History    Socioeconomic History      Marital status:       Number of children: 1    Occupational History      Occupation: retired asst  [address].        Employer: 2010    Tobacco Use      Smoking status: Former        Packs/day: 0.25        Years: 10.00        Pack years: 2.5        Types: Cigarettes        Quit date: 1/10/2023        Years since quittin.1        Passive exposure: Never      Smokeless tobacco: Former      Tobacco comments: about 5 cig a day    Substance and Sexual Activity      Alcohol use: No      Drug use: No      Sexual activity: Not Currently        Partners: Male        Birth control/protection: Post-menopausal    Social History Narrative      Exercise:  Childcare.          Ett:  3 days a week            Daughter and her 3 kids live with her.          Social Determinants of Health  Financial Resource Strain: Low Risk       Difficulty of Paying Living Expenses: Not hard at all  Food Insecurity: No Food Insecurity      Worried About Running Out of Food in the Last Year: Never true      Ran Out of Food in the Last Year: Never true  Transportation Needs: No Transportation Needs      Lack of Transportation (Medical): No      Lack of Transportation (Non-Medical): No  Physical Activity: Sufficiently Active      Days of Exercise per Week: 5 days      Minutes of Exercise per Session: 40 min  Stress: No Stress Concern Present      Feeling of Stress : Not at all  Social Connections: Socially Isolated      Frequency of Communication with Friends and Family: More than three times a week      Frequency of Social  Gatherings with Friends and Family: More than three times a week      Attends [employer] Services: Never      Active Member of Clubs or Organizations: No      Marital Status:   Housing Stability: Low Risk       Unable to Pay for Housing in the Last Year: No      Number of Places Lived in the Last Year: 1      Unstable Housing in the Last Year: No    Current Outpatient Medications:  albuterol (PROAIR HFA) 90 mcg/actuation inhaler, 2 puffs qid prn, Disp: 54 g, Rfl: 3  amLODIPine (NORVASC) 5 MG tablet, Take 1 tablet (5 mg total) by mouth every evening., Disp: 90 tablet, Rfl: 1  anastrozole (ARIMIDEX) 1 mg Tab, Take 1 tablet (1 mg total) by mouth once daily., Disp: 90 tablet, Rfl: 3  ascorbic acid, vitamin C, (VITAMIN C) 500 MG tablet, Take 500 mg by mouth once daily., Disp: , Rfl:   aspirin (ECOTRIN) 81 MG EC tablet, Take 81 mg by mouth once daily., Disp: , Rfl:   atorvastatin (LIPITOR) 80 MG tablet, Take 1 tablet (80 mg total) by mouth once daily., Disp: 90 tablet, Rfl: 3  benztropine (COGENTIN) 0.5 MG tablet, Take 1 tablet (0.5 mg total) by mouth 2 (two) times daily., Disp: 180 tablet, Rfl: 3  cloNIDine (CATAPRES) 0.1 MG tablet, 1 tab po q day for systolic BP > 160 or DBP >100., Disp: 30 tablet, Rfl: 1  cyanocobalamin 500 MCG tablet, Take 500 mcg by mouth once daily., Disp: , Rfl:   diclofenac sodium (VOLTAREN) 1 % Gel, Apply 2 g topically once daily., Disp: 200 g, Rfl: 0  ferrous sulfate 325 (65 FE) MG EC tablet, Take 1 tablet (325 mg total) by mouth once daily., Disp: 1 tablet, Rfl: 0  folic acid (FOLVITE) 1 MG tablet, Take 1 tablet (1,000 mcg total) by mouth once daily., Disp: 90 tablet, Rfl: 3  gabapentin (NEURONTIN) 100 MG capsule, Take 1 capsule (100 mg total) by mouth 2 (two) times daily as needed (anxiety)., Disp: 180 capsule, Rfl: 3  gabapentin (NEURONTIN) 300 MG capsule, Take 1 capsule (300 mg total) by mouth every evening., Disp: 90 capsule, Rfl: 3  isosorbide mononitrate (IMDUR) 30 MG 24 hr tablet,  TAKE 1 TABLET(30 MG) BY MOUTH EVERY DAY, Disp: 30 tablet, Rfl: 11  lisinopriL 10 MG tablet, Take 1 tablet (10 mg total) by mouth once daily., Disp: 90 tablet, Rfl: 3  LUTEIN ORAL, Take by mouth., Disp: , Rfl:   methotrexate 2.5 MG Tab, Take 6 tablets (15 mg total) by mouth every 7 days. Take 3 tabs Mon am and 3 tabs Mon pm, Disp: 24 tablet, Rfl: 2  multivitamin (THERAGRAN) per tablet, Take 1 tablet by mouth once daily., Disp: , Rfl:   omega-3 fatty acids/fish oil (FISH OIL-OMEGA-3 FATTY ACIDS) 300-1,000 mg capsule, Take by mouth once daily., Disp: , Rfl:   pantoprazole (PROTONIX) 40 MG tablet, Take 1 tablet (40 mg total) by mouth once daily., Disp: 30 tablet, Rfl: 11  risperiDONE (RISPERDAL) 2 MG tablet, TAKE 1 TABLET(2 MG) BY MOUTH EVERY MORNING, Disp: 90 tablet, Rfl: 3  risperiDONE (RISPERDAL) 4 MG tablet, Take 1 tablet (4 mg total) by mouth every evening., Disp: 90 tablet, Rfl: 3  tofacitinib citrate (XELJANZ ORAL), Take by mouth., Disp: , Rfl:   traMADoL (ULTRAM) 50 mg tablet, TAKE 1 TABLET BY MOUTH EVERY 12 HOURS AS NEEDED FOR PAIN, Disp: 30 tablet, Rfl: 0  venlafaxine (EFFEXOR-XR) 75 MG 24 hr capsule, Take 1 capsule (75 mg total) by mouth once daily., Disp: 90 capsule, Rfl: 3  VITAMIN A ORAL, Take by mouth., Disp: , Rfl:   vitamin D (VITAMIN D3) 1000 units Tab, Take 1,000 Units by mouth once daily., Disp: , Rfl:   vitamin E 200 UNIT capsule, Take 200 Units by mouth once daily., Disp: , Rfl:   zinc gluconate 50 mg tablet, Take 50 mg by mouth once daily., Disp: , Rfl:     No current facility-administered medications for this visit.  Facility-Administered Medications Ordered in Other Visits:  tropicamide 1% ophthalmic solution 1 drop, 1 drop, Right Eye, On Call Procedure, Karen Song MD, 1 drop at 08/01/19 0648        Review of patient's allergies indicates:   -- Etanercept -- Other (See Comments)    --  Other reaction(s): recurrent infections   -- Chloramphenicol sod succinate -- Hives   -- Codeine --  Other (See Comments)    --  Other reaction(s): Stomach upset. Pt states OK             with Percocet   -- Nickel sutures [surgical stainless steel] -- Dermatitis    --  Allergic contact dermatitis   -- Adhesive -- Rash            Review of Systems   Constitutional: Negative for weight gain and weight loss.   Cardiovascular:  Negative for chest pain.   Respiratory:  Negative for shortness of breath.    Musculoskeletal:  Positive for back pain. Negative for joint pain and joint swelling.   Gastrointestinal:  Negative for abdominal pain, bowel incontinence, nausea and vomiting.   Genitourinary:  Negative for bladder incontinence.   Neurological:  Positive for paresthesias (hands). Negative for numbness.       Objective:        General: Bhavna is well-developed, well-nourished, appears stated age, in no acute distress, alert and oriented to time, place and person.     General    Vitals reviewed.  Constitutional: She is oriented to person, place, and time. She appears well-developed and well-nourished.   HENT:   Head: Normocephalic and atraumatic.   Pulmonary/Chest: Effort normal.   Neurological: She is alert and oriented to person, place, and time.   Psychiatric: She has a normal mood and affect. Her behavior is normal. Judgment and thought content normal.     General Musculoskeletal Exam   Gait: normal     Right Ankle/Foot Exam     Tests   Tiptoe Walk: able to perform    Left Ankle/Foot Exam     Tests   Tiptoe Walk: able to perform  Back (L-Spine & T-Spine) / Neck (C-Spine) Exam     Tenderness Right paramedian tenderness of the Sacrum. Left paramedian tenderness of the Sacrum.     Back (L-Spine & T-Spine) Range of Motion   Extension:  10   Flexion:  90   Lateral bend right:  10 (with pain on left)   Lateral bend left:  10   Rotation right:  30 (pain on left)   Rotation left:  30     Spinal Sensation   Right Side Sensation  C-Spine Level: normal   L-Spine Level: normal  S-Spine Level: normal  Left Side Sensation  C-Spine  Level: normal  L-Spine Level: normal  S-Spine Level: normal    Back (L-Spine & T-Spine) Tests   Right Side Tests  Straight leg raise:        Sitting SLR: > 70 degrees    Left Side Tests  Straight leg raise:       Sitting SLR: > 70 degrees      Other   She has scoliosis .  Spinal Kyphosis:  Absent    Comments:  Pos HELIO bilaterally with back pain      Muscle Strength   Right Upper Extremity   Biceps: 5/5   Deltoid:  5/5  Triceps:  5/5  Wrist extension: 5/5   Finger Flexors:  5/5  Left Upper Extremity  Biceps: 5/5   Deltoid:  5/5  Triceps:  5/5  Wrist extension: 5/5   Finger Flexors:  5/5  Right Lower Extremity   Hip Flexion: 5/5   Quadriceps:  5/5   Anterior tibial:  5/5   EHL:  5/5  Left Lower Extremity   Hip Flexion: 5/5   Quadriceps:  5/5   Anterior tibial:  5/5   EHL:  5/5    Reflexes     Left Side  Biceps:  2+  Triceps:  2+  Brachioradialis:  2+  Achilles:  2+  Left Hilton's Sign:  Absent  Babinski Sign:  absent  Quadriceps:  2+    Right Side   Biceps:  2+  Triceps:  2+  Brachioradialis:  2+  Achilles:  2+  Right Hilton's Sign:  absent  Babinski Sign:  absent  Quadriceps:  2+    Vascular Exam     Right Pulses        Carotid:                  2+    Left Pulses        Carotid:                  2+            Assessment:       1. SI (sacroiliac) joint dysfunction    2. Chronic low back pain without sciatica, unspecified back pain laterality    3. Spondylosis of lumbar region without myelopathy or radiculopathy           Plan:       Orders Placed This Encounter    X-Ray Lumbar Spine Ap Lateral w/Flex Ext    MRI Lumbar Spine Without Contrast    Ambulatory referral/consult to Pain Clinic    Ambulatory referral/consult to Physical/Occupational Therapy     We discussed back pain and the nature of back pain.  We discussed that it is not one thing that causes the pain but an accumulation of multiple things that we do.  We discussed great relief from her RFA for sacroiliitis.  Pain is back in bilateral SI joints.  She  has had RFA x 2.  She does have some memory problems, and notes pain always comes back.  We discussed a couple of years relief is great.  They did discuss SI joint fusion in past  We discussed posture sitting and the importance of trying to sit better.  We discussed watching posture sitting and sitting with good posture and taking standing breaks.  We discussed getting up before her back makes her get up  We discussed the benefits of therapy and exercise and continuing to move.  We discussed need to stretch and move.  She feels like PT never works but has not done it recently.  We will update x-ray and MRI  Pain management in [place] to consider repeat RFA for Si joint   PT for back and core strengthening SI joint mobilization and HEP in [place]  She will follow up with pain for now. She does not like coming to [facility]    More than 50% of the total time  of 45 minutes was spent face to face in counseling on diagnosis and treatment options. I also counseled patient  on common and most usual side effect of prescribed medications.  I reviewed Primary care , and other specialty's notes to better coordinate patient's care. All questions were answered, and patient voiced understanding.         Follow-up: Follow up if symptoms worsen or fail to improve. If there are any questions prior to this, the patient was instructed to contact the office.

## 2023-03-02 ENCOUNTER — TELEPHONE (OUTPATIENT)
Dept: OTOLARYNGOLOGY | Facility: CLINIC | Age: 68
End: 2023-03-02
Payer: MEDICARE

## 2023-03-02 ENCOUNTER — OFFICE VISIT (OUTPATIENT)
Dept: SPINE | Facility: CLINIC | Age: 68
End: 2023-03-02
Attending: PHYSICAL MEDICINE & REHABILITATION
Payer: MEDICARE

## 2023-03-02 VITALS
SYSTOLIC BLOOD PRESSURE: 138 MMHG | RESPIRATION RATE: 18 BRPM | DIASTOLIC BLOOD PRESSURE: 70 MMHG | WEIGHT: 103 LBS | BODY MASS INDEX: 18.54 KG/M2 | TEMPERATURE: 99 F | HEART RATE: 65 BPM | OXYGEN SATURATION: 100 %

## 2023-03-02 DIAGNOSIS — J32.0 CHRONIC MAXILLARY SINUSITIS: Primary | ICD-10-CM

## 2023-03-02 DIAGNOSIS — G89.29 CHRONIC LOW BACK PAIN WITHOUT SCIATICA, UNSPECIFIED BACK PAIN LATERALITY: ICD-10-CM

## 2023-03-02 DIAGNOSIS — M47.816 SPONDYLOSIS OF LUMBAR REGION WITHOUT MYELOPATHY OR RADICULOPATHY: ICD-10-CM

## 2023-03-02 DIAGNOSIS — M54.50 CHRONIC LOW BACK PAIN WITHOUT SCIATICA, UNSPECIFIED BACK PAIN LATERALITY: ICD-10-CM

## 2023-03-02 DIAGNOSIS — M53.3 SI (SACROILIAC) JOINT DYSFUNCTION: Primary | ICD-10-CM

## 2023-03-02 PROCEDURE — 3288F FALL RISK ASSESSMENT DOCD: CPT | Mod: CPTII,S$GLB,, | Performed by: PHYSICAL MEDICINE & REHABILITATION

## 2023-03-02 PROCEDURE — 1101F PT FALLS ASSESS-DOCD LE1/YR: CPT | Mod: CPTII,S$GLB,, | Performed by: PHYSICAL MEDICINE & REHABILITATION

## 2023-03-02 PROCEDURE — 1159F PR MEDICATION LIST DOCUMENTED IN MEDICAL RECORD: ICD-10-PCS | Mod: CPTII,S$GLB,, | Performed by: PHYSICAL MEDICINE & REHABILITATION

## 2023-03-02 PROCEDURE — 99204 OFFICE O/P NEW MOD 45 MIN: CPT | Mod: S$GLB,,, | Performed by: PHYSICAL MEDICINE & REHABILITATION

## 2023-03-02 PROCEDURE — 4010F ACE/ARB THERAPY RXD/TAKEN: CPT | Mod: CPTII,S$GLB,, | Performed by: PHYSICAL MEDICINE & REHABILITATION

## 2023-03-02 PROCEDURE — 1101F PR PT FALLS ASSESS DOC 0-1 FALLS W/OUT INJ PAST YR: ICD-10-PCS | Mod: CPTII,S$GLB,, | Performed by: PHYSICAL MEDICINE & REHABILITATION

## 2023-03-02 PROCEDURE — 3075F PR MOST RECENT SYSTOLIC BLOOD PRESS GE 130-139MM HG: ICD-10-PCS | Mod: CPTII,S$GLB,, | Performed by: PHYSICAL MEDICINE & REHABILITATION

## 2023-03-02 PROCEDURE — 3078F PR MOST RECENT DIASTOLIC BLOOD PRESSURE < 80 MM HG: ICD-10-PCS | Mod: CPTII,S$GLB,, | Performed by: PHYSICAL MEDICINE & REHABILITATION

## 2023-03-02 PROCEDURE — 4010F PR ACE/ARB THEARPY RXD/TAKEN: ICD-10-PCS | Mod: CPTII,S$GLB,, | Performed by: PHYSICAL MEDICINE & REHABILITATION

## 2023-03-02 PROCEDURE — 1125F AMNT PAIN NOTED PAIN PRSNT: CPT | Mod: CPTII,S$GLB,, | Performed by: PHYSICAL MEDICINE & REHABILITATION

## 2023-03-02 PROCEDURE — 99204 PR OFFICE/OUTPT VISIT, NEW, LEVL IV, 45-59 MIN: ICD-10-PCS | Mod: S$GLB,,, | Performed by: PHYSICAL MEDICINE & REHABILITATION

## 2023-03-02 PROCEDURE — 1160F RVW MEDS BY RX/DR IN RCRD: CPT | Mod: CPTII,S$GLB,, | Performed by: PHYSICAL MEDICINE & REHABILITATION

## 2023-03-02 PROCEDURE — 3008F PR BODY MASS INDEX (BMI) DOCUMENTED: ICD-10-PCS | Mod: CPTII,S$GLB,, | Performed by: PHYSICAL MEDICINE & REHABILITATION

## 2023-03-02 PROCEDURE — 1159F MED LIST DOCD IN RCRD: CPT | Mod: CPTII,S$GLB,, | Performed by: PHYSICAL MEDICINE & REHABILITATION

## 2023-03-02 PROCEDURE — 3008F BODY MASS INDEX DOCD: CPT | Mod: CPTII,S$GLB,, | Performed by: PHYSICAL MEDICINE & REHABILITATION

## 2023-03-02 PROCEDURE — 1160F PR REVIEW ALL MEDS BY PRESCRIBER/CLIN PHARMACIST DOCUMENTED: ICD-10-PCS | Mod: CPTII,S$GLB,, | Performed by: PHYSICAL MEDICINE & REHABILITATION

## 2023-03-02 PROCEDURE — 3075F SYST BP GE 130 - 139MM HG: CPT | Mod: CPTII,S$GLB,, | Performed by: PHYSICAL MEDICINE & REHABILITATION

## 2023-03-02 PROCEDURE — 1125F PR PAIN SEVERITY QUANTIFIED, PAIN PRESENT: ICD-10-PCS | Mod: CPTII,S$GLB,, | Performed by: PHYSICAL MEDICINE & REHABILITATION

## 2023-03-02 PROCEDURE — 99999 PR PBB SHADOW E&M-EST. PATIENT-LVL V: CPT | Mod: PBBFAC,,, | Performed by: PHYSICAL MEDICINE & REHABILITATION

## 2023-03-02 PROCEDURE — 3288F PR FALLS RISK ASSESSMENT DOCUMENTED: ICD-10-PCS | Mod: CPTII,S$GLB,, | Performed by: PHYSICAL MEDICINE & REHABILITATION

## 2023-03-02 PROCEDURE — 3078F DIAST BP <80 MM HG: CPT | Mod: CPTII,S$GLB,, | Performed by: PHYSICAL MEDICINE & REHABILITATION

## 2023-03-02 PROCEDURE — 99999 PR PBB SHADOW E&M-EST. PATIENT-LVL V: ICD-10-PCS | Mod: PBBFAC,,, | Performed by: PHYSICAL MEDICINE & REHABILITATION

## 2023-03-02 RX ORDER — CEFDINIR 300 MG/1
300 CAPSULE ORAL 2 TIMES DAILY
Qty: 20 CAPSULE | Refills: 0 | Status: SHIPPED | OUTPATIENT
Start: 2023-03-02 | End: 2023-03-12

## 2023-03-02 NOTE — TELEPHONE ENCOUNTER
----- Message from Duglas Pan MA sent at 3/2/2023  3:09 PM CST -----  Regarding: FW: pt advice  Contact: [phone]  Spoke with patient. I let her know you will be in contact and I will follow up tomorrow with her on this message.  ----- Message -----  From: Jose Carbajal MA  Sent: 3/2/2023   1:10 PM CST  To: [facility] Staff  Subject: pt advice                                        Patient have a sinus infection stated is greenish with blood also headache over her right eye stated that  tylenol is not help would like something to be called into her  pharmacy please call patient  at     [id number] - [place] - [address] AT [facility] [address]  [address] [place] [address]  Phone: [phone] Fax: [phone]

## 2023-03-02 NOTE — TELEPHONE ENCOUNTER
Spoke with patient to let her know that  called in some Cefdinir to her [facility] pharmacy.

## 2023-03-02 NOTE — TELEPHONE ENCOUNTER
----- Message from Jose Carbajal MA sent at 3/2/2023  1:07 PM CST -----  Regarding: pt advice  Contact: [phone]  Patient have a sinus infection stated is greenish with blood also headache over her right eye stated that  tylenol is not help would like something to be called into her  pharmacy please call patient  [facility]     [id number] - [place]  [address] AT [facility] [address]  [address] [place] [address]  Phone: [phone] Fax: [phone]

## 2023-03-05 ENCOUNTER — TELEPHONE (OUTPATIENT)
Dept: INTERNAL MEDICINE | Facility: CLINIC | Age: 68
End: 2023-03-05
Payer: MEDICARE

## 2023-03-05 DIAGNOSIS — D50.9 IRON DEFICIENCY ANEMIA, UNSPECIFIED IRON DEFICIENCY ANEMIA TYPE: Primary | ICD-10-CM

## 2023-03-05 NOTE — TELEPHONE ENCOUNTER
Pls call-    Last blood count was normal.    Pls return for iron, ferritin (labs ) soon - we can schedule at one of her upcoming clinic appts.       Has she cut down on aleve, advil, etc, as GI doctor recommended?      Is she taking iron daily?

## 2023-03-06 ENCOUNTER — HOSPITAL ENCOUNTER (OUTPATIENT)
Dept: RADIOLOGY | Facility: HOSPITAL | Age: 68
Discharge: HOME | End: 2023-03-06
Attending: PHYSICAL MEDICINE & REHABILITATION
Payer: MEDICARE

## 2023-03-06 DIAGNOSIS — M53.3 SI (SACROILIAC) JOINT DYSFUNCTION: ICD-10-CM

## 2023-03-06 DIAGNOSIS — M47.816 SPONDYLOSIS OF LUMBAR REGION WITHOUT MYELOPATHY OR RADICULOPATHY: ICD-10-CM

## 2023-03-06 PROCEDURE — 72110 X-RAY EXAM L-2 SPINE 4/>VWS: CPT | Mod: TC,FY

## 2023-03-06 PROCEDURE — 72110 XR LUMBAR SPINE AP AND LAT WITH FLEX/EXT: ICD-10-PCS | Mod: 26,,, | Performed by: RADIOLOGY

## 2023-03-06 PROCEDURE — 72110 X-RAY EXAM L-2 SPINE 4/>VWS: CPT | Mod: 26,,, | Performed by: RADIOLOGY

## 2023-03-06 NOTE — TELEPHONE ENCOUNTER
Pt advised, she states that she isn't taking any tylenol or aleve and she is taking her iron daily. Labs booked for tomorrow.

## 2023-03-07 ENCOUNTER — LAB VISIT (OUTPATIENT)
Dept: LAB | Facility: HOSPITAL | Age: 68
End: 2023-03-07
Attending: INTERNAL MEDICINE
Payer: MEDICARE

## 2023-03-07 DIAGNOSIS — D50.9 IRON DEFICIENCY ANEMIA, UNSPECIFIED IRON DEFICIENCY ANEMIA TYPE: ICD-10-CM

## 2023-03-07 LAB
FERRITIN SERPL-MCNC: 23 NG/ML (ref 20–300)
IRON SERPL-MCNC: 175 UG/DL (ref 30–160)
SATURATED IRON: 42 % (ref 20–50)
TOTAL IRON BINDING CAPACITY: 419 UG/DL (ref 250–450)
TRANSFERRIN SERPL-MCNC: 283 MG/DL (ref 200–375)

## 2023-03-07 PROCEDURE — 84466 ASSAY OF TRANSFERRIN: CPT | Performed by: INTERNAL MEDICINE

## 2023-03-07 PROCEDURE — 36415 COLL VENOUS BLD VENIPUNCTURE: CPT | Mod: PO | Performed by: INTERNAL MEDICINE

## 2023-03-07 PROCEDURE — 82728 ASSAY OF FERRITIN: CPT | Performed by: INTERNAL MEDICINE

## 2023-03-08 ENCOUNTER — TELEPHONE (OUTPATIENT)
Dept: SPINE | Facility: CLINIC | Age: 68
End: 2023-03-08
Payer: MEDICARE

## 2023-03-08 NOTE — TELEPHONE ENCOUNTER
----- Message from Guillermina Milton sent at 3/8/2023  8:19 AM CST -----  Name of Who is Calling: PHUONG HAZEL [[id number]]          What is the request in detail: Pt is calling to discuss results. Please contact to further discuss and advise.            Can the clinic reply by [facility]: No          What Number to Call Back if not in [facility]: [phone]

## 2023-03-08 NOTE — TELEPHONE ENCOUNTER
Please let her know it shows some degenerative changes, and waiting on MRI results to get more information and more of an idea of injection options and that is scheduled on 3/11

## 2023-03-09 ENCOUNTER — CLINICAL SUPPORT (OUTPATIENT)
Dept: REHABILITATION | Facility: HOSPITAL | Age: 68
End: 2023-03-09
Payer: MEDICARE

## 2023-03-09 DIAGNOSIS — Z74.09 IMPAIRED FUNCTIONAL MOBILITY, BALANCE, GAIT, AND ENDURANCE: ICD-10-CM

## 2023-03-09 DIAGNOSIS — R26.89 DECREASED FUNCTIONAL MOBILITY: ICD-10-CM

## 2023-03-09 DIAGNOSIS — M47.816 SPONDYLOSIS OF LUMBAR REGION WITHOUT MYELOPATHY OR RADICULOPATHY: ICD-10-CM

## 2023-03-09 DIAGNOSIS — M53.86 DECREASED RANGE OF MOTION OF LUMBAR SPINE: ICD-10-CM

## 2023-03-09 DIAGNOSIS — M53.3 SI (SACROILIAC) JOINT DYSFUNCTION: ICD-10-CM

## 2023-03-09 PROCEDURE — 97530 THERAPEUTIC ACTIVITIES: CPT | Mod: PN

## 2023-03-09 PROCEDURE — 97162 PT EVAL MOD COMPLEX 30 MIN: CPT | Mod: PN

## 2023-03-10 ENCOUNTER — TELEPHONE (OUTPATIENT)
Dept: OTOLARYNGOLOGY | Facility: CLINIC | Age: 68
End: 2023-03-10
Payer: MEDICARE

## 2023-03-10 NOTE — TELEPHONE ENCOUNTER
I spoke with her.  Cefdinir didn't seem to help.  Still coughing with PND.  Recommended guaifenesin and f/u with me for exam and culture swab next week when she is able.  Will call.

## 2023-03-10 NOTE — TELEPHONE ENCOUNTER
----- Message from Duglas Pan MA sent at 3/10/2023 11:29 AM CST -----  Regarding: FW: Pt Advice  Contact: Pt @ [phone]  Please Advise.   Called patient to offer appointment on 3/15 but she is unable to make that appointment.  ----- Message -----  From: Jerome Rajput  Sent: 3/9/2023   4:54 PM CST  To: [facility] Staff  Subject: Pt Advice                                        Pt is calling in reference to a sinus headache; she has been taking tylenol for two day with runny nose, draining down her throat, and persistent cough please call to advise. Thank you.

## 2023-03-11 ENCOUNTER — HOSPITAL ENCOUNTER (OUTPATIENT)
Dept: RADIOLOGY | Facility: HOSPITAL | Age: 68
Discharge: HOME | End: 2023-03-11
Attending: PHYSICAL MEDICINE & REHABILITATION
Payer: MEDICARE

## 2023-03-11 DIAGNOSIS — M47.816 SPONDYLOSIS OF LUMBAR REGION WITHOUT MYELOPATHY OR RADICULOPATHY: ICD-10-CM

## 2023-03-11 DIAGNOSIS — M53.3 SI (SACROILIAC) JOINT DYSFUNCTION: ICD-10-CM

## 2023-03-11 PROCEDURE — 72148 MRI LUMBAR SPINE W/O DYE: CPT | Mod: TC

## 2023-03-11 PROCEDURE — 72148 MRI LUMBAR SPINE W/O DYE: CPT | Mod: 26,,, | Performed by: RADIOLOGY

## 2023-03-11 PROCEDURE — 72148 MRI LUMBAR SPINE WITHOUT CONTRAST: ICD-10-PCS | Mod: 26,,, | Performed by: RADIOLOGY

## 2023-03-13 ENCOUNTER — TELEPHONE (OUTPATIENT)
Dept: INTERNAL MEDICINE | Facility: CLINIC | Age: 68
End: 2023-03-13
Payer: MEDICARE

## 2023-03-13 ENCOUNTER — TELEPHONE (OUTPATIENT)
Dept: OTOLARYNGOLOGY | Facility: CLINIC | Age: 68
End: 2023-03-13
Payer: MEDICARE

## 2023-03-13 PROBLEM — Z74.09 IMPAIRED FUNCTIONAL MOBILITY, BALANCE, GAIT, AND ENDURANCE: Status: ACTIVE | Noted: 2023-03-13

## 2023-03-13 PROBLEM — R26.89 DECREASED FUNCTIONAL MOBILITY: Status: ACTIVE | Noted: 2023-03-13

## 2023-03-13 PROBLEM — M53.86 DECREASED RANGE OF MOTION OF LUMBAR SPINE: Status: ACTIVE | Noted: 2023-03-13

## 2023-03-13 NOTE — PLAN OF CARE
[facility]   Physical Therapy Initial Evaluation     Date: 3/9/2023   Name: Bhavna Landry  Clinic Number: [id number]    Therapy Diagnosis:   Encounter Diagnoses   Name Primary?    SI (sacroiliac) joint dysfunction     Spondylosis of lumbar region without myelopathy or radiculopathy     Decreased functional mobility     Impaired functional mobility, balance, gait, and endurance     Decreased range of motion of lumbar spine    Physician: Allyson Galaviz, *    Physician Orders: PT Eval and Treat   Medical Diagnosis from Referral:   M53.3 (ICD-10-CM) - SI (sacroiliac) joint dysfunction   M47.816 (ICD-10-CM) - Spondylosis of lumbar region without myelopathy or radiculopathy     Evaluation Date: 3/9/2023  Authorization Period Expiration: 4/6/2023  Plan of Care Expiration: 5/4/2023  Progress Note Due: 4/9/2023  Visit # / Visits authorized: 1/ pending   FOTO: 1/5    Precautions: Standard, asthma, smoker, osteoporosis    Time In: 2:04 pm  Time Out: 3:00 pm  Total Appointment Time (timed & untimed codes): 56 minutes      SUBJECTIVE     Date of onset: ~ 6 months ago    History of current condition - Jessica reports insidious onset of low back pain about 6 months ago that has progressively gotten worse. Reports radiating pain down bilateral lower extremities that stops at the knee. Notices radiating pain with sitting and transitioning from sit to stand. Reports pain with sitting, standing, and walking. Gets relief with lying flat. Reports history of LBP with therapy that didn't completely resolved. Had a right sided mastectomy in May and currently on chemo medication. Believes her pain began following the surgery.    Falls: 0     Imaging,   XRAY: 3/2/2023  Alignment: Progressive scoliosis convex LEFT  Vertebrae: Concavity of the superior cortical endplate of L4 progressive.  No suspicious appearing lytic or blastic lesions.  Discs and facets: Disc space narrowing L1-L2-L3-L4.  No definite evidence  for fracture on the basis of this examination.  Sclerosis of facet joints predominantly L4-L5-S1.    XRAY: lumbar 2020  Convex left curvature of the thoracolumbar spine again identified.  There is grade 1 retrolisthesis of L3 on L4.  Allowing for limitation by overlapping endplates the lumbar vertebral body heights and contours are relatively stable without evidence for acute fracture.  Further evaluation as warranted clinically.     MRI: not released     Prior Therapy: yes, low back pain and lateral epicondylitis   Social History:  lives with their daughter, son-in-law, and grandchildren  Occupation: retired   Prior Level of Function: independent; used to go to the gym 3-4x/week and walk 2 miles 3x/week.  Current Level of Function: able to cook and complete laundry with pain and rest breaks. Unable to sweep, vacuuming, or mopping.    Pain:  Current 7/10, worst 9/10, best 4/10   Location: bilateral lumbosacral region   Description: Aching, Dull, Tight, Electric, and Shooting  Aggravating Factors: Sitting, Standing, Walking, Lifting, and Getting out of bed/chair  Easing Factors: lying down and rest, heating pad and tylenol    Patients goals: pain-free walking (2 miles - 3x/week) and be able to go to the gym     Medical History:   Past Medical History:   Diagnosis Date    Allergy     Amblyopia     Anemia     Anticoagulant long-term use     Arthritis 02/02/1992    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, estrogen receptor positive 4/13/2022    Cataract     Depression     Dry eyes     Dry mouth     Duane's syndrome of right eye     Early dry stage nonexudative age-related macular degeneration of both eyes 9/20/2022    Fibromyalgia 4/17/2014    Fibromyalgia     Fractured hip     RIGHT HIP    GERD (gastroesophageal reflux disease)     History of psychiatric hospitalization     Hyperlipidemia 02/02/1992    Hypertension     Hypocalcemia     Hyponatremia     Kidney stone     Migraine headache      Osteoporosis     Psoriatic arthritis 1992    Right knee pain     post knee replacement surgery (possible rejectiion of metal)    RLS (restless legs syndrome)     Schizophrenia 1992    stable on meds    Squamous cell carcinoma of skin     Urinary tract infection        Surgical History:   Bhavna Landry  has a past surgical history that includes Surgery on right knee (1982); cyst removed from right sinus (1982); tumor removed from back left side upper shoulder (2006); Hysterectomy; Knee surgery;  section; Joint replacement (Right); Colonoscopy (N/A, 2016); Injection of anesthetic agent into sacroiliac joint (Right, 2018); Injection of anesthetic agent into sacroiliac joint (Bilateral, 2018); Injection of anesthetic agent into sacroiliac joint (Bilateral, 2019); brow ptosis repair (Right, 2019); Injection of anesthetic agent around medial branch nerves innervating lumbar facet joint (N/A, 2019); Radiofrequency thermocoagulation (Right, 2019); Radiofrequency thermocoagulation (Left, 2019); Phacoemulsification of cataract (Right, 2019); Intraocular prosthesis insertion (Right, 2019); Cataract extraction; Phacoemulsification of cataract (Left, 2019); Intraocular prosthesis insertion (Left, 2019); Radiofrequency thermocoagulation (Right, 10/22/2019); Radiofrequency thermocoagulation (Left, 10/29/2019); Radiofrequency thermocoagulation (Left, 2020); Radiofrequency thermocoagulation (Right, 2020); Mastectomy (Right, 2022); Haworth lymph node biopsy (Right, 2022); Injection for sentinel node identification (Right, 2022); Colonoscopy (N/A, 2022); and Esophagogastroduodenoscopy (N/A, 2023).    Medications:   Bhavna has a current medication list which includes the following prescription(s): albuterol, amlodipine, anastrozole, ascorbic acid (vitamin c), aspirin, atorvastatin, benztropine, cefdinir,  clonidine, cyanocobalamin, diclofenac sodium, ferrous sulfate, folic acid, gabapentin, gabapentin, isosorbide mononitrate, lisinopril, lutein, methotrexate, multivitamin, fish oil-omega-3 fatty acids, pantoprazole, risperidone, risperidone, tofacitinib citrate, tramadol, venlafaxine, vitamin a, vitamin d, vitamin e, and zinc gluconate, and the following Facility-Administered Medications: tropicamide 1%.    Allergies:   Review of patient's allergies indicates:   Allergen Reactions    Etanercept Other (See Comments)     Other reaction(s): recurrent infections    Chloramphenicol sod succinate Hives    Codeine Other (See Comments)     Other reaction(s): Stomach upset. Pt states OK with Percocet    Nickel sutures [surgical stainless steel] Dermatitis     Allergic contact dermatitis    Adhesive Rash          OBJECTIVE     Posture: thoracolumbar convex curvature to left (left rib hump); thoracic kyphosis     Palpation: (+) bilateral piriformis, bilateral PSIS (R>L), right greater trochanter    Sensation: intact light touch sensation to BLE throughout L2-S2 dermatomal pattern    Gross Deep Tendon Reflexes:    Right  Left   Patellar (L3-L4): 3+ 3+   Achilles (S1):  2+ 2+     Functional Movements:  Gait Analysis: lack of bilateral hip extension and bilateral terminal knee extension    AROM:   Degrees Pain/Dysfunction   Flexion finger tip to floor P! from flexion < > extension  Hardtner's Sign: +   Extension 25% Tightness   Right Side Bending  25% dull, tight P! on R   Left Side Bending  25% dull, tight P! on R   Right Rotation 25% P! radiating in buttocks    Left Rotation 25% NP     Hip AROM:   RLE LLE   Internal Rotation 25 30   External Rotation 27* (! in R PSIS) 26* (! in L PSIS)   Flexion NT NT       Strength:  RLE  LLE    Hip flexion: 4-/5 Hip flexion: 4-/5   Hip Abduction: 4-/5 Hip abduction: 4-/5   Hip extension 4-/5 Hip extension 4-/5   Knee flexion: 4-/5 Knee flexion: 4-/5   Knee extension: 4/5 Knee extension: 4-/5*  "  Ankle Dorsiflexion: 4+/5 Ankle Dorsiflexion: 4-/5*   Ankle Plantarflexion: 4+/5 Ankle Plantarflexion: 4-/5*     Special tests:   SLR: (+) R - into buttucks; (+) L - radiating from buttocks into calf  Slump: (+) L  HELIO: (+) B  FADIR: (+) B  Scour's: (+) B    Muscle Length Tests:  90-90 HS Test: limited     SIJ Dysfunction Cluster:   Gaenslen's Test: NT  Thigh Thrust: +  SIJ Distraction: +  SIJ Compression: +  Sacral Thrust: +    Joint mobility:   Thoracic: NT  Lumbar: painful and hypomobile  L3-S1; retrolisthesis to L3    Flexibility: tight hip external/internal rotators, hamstrings    Limitation/Restriction for FOTO Lumbar Survey    Therapist reviewed FOTO scores for Bhavna Landry on 3/9/2023.   FOTO documents entered into [employer] - see Media section.    Limitation Score: 74%         TREATMENT     Total Treatment time (time-based codes) separate from Evaluation: 10 minutes      Jessica received the treatments listed below:      therapeutic activities to improve functional performance for 10  minutes, including:  LTR: 20x  Scaitic nerve glides: 20x   Piriformis stretch: 2x30"  SKTC: 3x10"      PATIENT EDUCATION AND HOME EXERCISES     Education provided:   - Prognosis  - Plan of Care  - Pain Science    Written Home Exercises Provided: yes. Exercises were reviewed and Jessica was able to demonstrate them prior to the end of the session.  Jessica demonstrated good  understanding of the education provided. See EMR under Patient Instructions for exercises provided during therapy sessions.    ASSESSMENT     Bhavna is a 67 y.o. 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 Patient presents with signs and symptoms consistent with L4-L5 radiculopathy and sacroiliitis. Bilateral hip mobility deficits and piriformis tightness (R>L). Would benefit from " lumbar manual distraction, neuromobility exercises and improving bilateral hip flexibility.    Patient prognosis is Good.   Patient will benefit from skilled outpatient Physical Therapy to address the deficits stated above and in the chart below, provide patient /family education, and to maximize patientt's level of independence.     Plan of care discussed with patient: Yes  Patient's spiritual, cultural and educational needs considered and patient is agreeable to the plan of care and goals as stated below:     Anticipated Barriers for therapy: co-morbidities    Medical Necessity is demonstrated by the following  History  Co-morbidities and personal factors that may impact the plan of care Co-morbidities:   advanced age, depression, history of cancer, HTN, and osteoporosis, asthma, fibromyalgia , smoker    Personal Factors:   age  coping style  social background  lifestyle  character  attitudes     high   Examination  Body Structures and Functions, activity limitations and participation restrictions that may impact the plan of care Body Regions:   back  lower extremities  trunk    Body Systems:    gross symmetry  ROM  strength  gross coordinated movement  balance  gait  transfers  transitions  motor control    Participation Restrictions:   none     Activity limitations:   Learning and applying knowledge  no deficits    General Tasks and Commands  no deficits    Communication  no deficits    Mobility  lifting and carrying objects  walking  driving (bike, car, motorcycle)    Self care  dressing    Domestic Life  shopping  cooking  doing house work (cleaning house, washing dishes, laundry)  assisting others    Interactions/Relationships  basic interpersonal interactions  complex interpersonal interactions    Life Areas  no deficits    Community and Social Life  community life  recreation and leisure         moderate   Clinical Presentation evolving clinical presentation with changing clinical characteristics moderate    Decision Making/ Complexity Score: moderate     Goals:  Short Term Goals (4 Weeks):  1. Patient will be compliant with home exercise program to supplement therapy in promoting functional mobility.  2. Patient will perform transverse abdominus contraction with good control to demonstrate improved core strength.  3. Patient will report no pain during thoracolumbar active range of motion to promote functional mobility.  4. Patient will improve impaired lower extremity myotomes/manual muscle tests  to >/=4/5 to improve strength for functional tasks.  5. Patient will reports </= 3/10 pain at rest to improve quality of life.    Long Term Goals (8 Weeks):   1. Patient will improve FOTO score to </= 50% limited to decrease perceived limitation with maintaining/changing body position.   2. Patient will improve bilateral hip external/internal rotation range of motion to 30 degrees to promote functional mobility.  3. Patient will improve impaired lower extremity myotomes/manual muscle tests to >/=4+/5 to improve strength for functional tasks.  4. Patient will report no pain while walking 1 mile to return to daily walks.      PLAN   Plan of care Certification: 3/9/2023 to 5/4/2023.    Outpatient Physical Therapy 2 times weekly for 8 weeks to include the following interventions: Cervical/Lumbar Traction, Gait Training, Manual Therapy, Moist Heat/ Ice, Neuromuscular Re-ed, Patient Education, Self Care, Therapeutic Activities, and Therapeutic Exercise.     Stephanie Castellanos, PT, DPT      I CERTIFY THE NEED FOR THESE SERVICES FURNISHED UNDER THIS PLAN OF TREATMENT AND WHILE UNDER MY CARE   Physician's comments:     Physician's Signature: ___________________________________________________

## 2023-03-13 NOTE — TELEPHONE ENCOUNTER
----- Message from Jess Marcos sent at 3/13/2023  4:25 PM CDT -----  Contact: Patient [phone]  [email or web address]     Patient is calling for Medical Advice regarding:Poss Sinus    How long has patient had these symptoms:2 weeks     [facility] and phone#:  [facility] [employer] [id number] [place] - [address] AT [facility] [address]  [address] [place] [address]  Phone: [phone] Fax: [phone]            Would like response via [employer]:  call     Comments:

## 2023-03-14 ENCOUNTER — OFFICE VISIT (OUTPATIENT)
Dept: PAIN MEDICINE | Facility: CLINIC | Age: 68
End: 2023-03-14
Payer: MEDICARE

## 2023-03-14 ENCOUNTER — TELEPHONE (OUTPATIENT)
Dept: PAIN MEDICINE | Facility: CLINIC | Age: 68
End: 2023-03-14
Payer: MEDICARE

## 2023-03-14 ENCOUNTER — OFFICE VISIT (OUTPATIENT)
Dept: FAMILY MEDICINE | Facility: CLINIC | Age: 68
End: 2023-03-14
Attending: FAMILY MEDICINE
Payer: MEDICARE

## 2023-03-14 VITALS
HEART RATE: 83 BPM | DIASTOLIC BLOOD PRESSURE: 81 MMHG | SYSTOLIC BLOOD PRESSURE: 143 MMHG | BODY MASS INDEX: 17.57 KG/M2 | WEIGHT: 99.19 LBS

## 2023-03-14 VITALS
BODY MASS INDEX: 17.57 KG/M2 | HEIGHT: 63 IN | TEMPERATURE: 98 F | DIASTOLIC BLOOD PRESSURE: 80 MMHG | OXYGEN SATURATION: 98 % | SYSTOLIC BLOOD PRESSURE: 160 MMHG | HEART RATE: 74 BPM | WEIGHT: 99.19 LBS

## 2023-03-14 DIAGNOSIS — K21.9 GASTROESOPHAGEAL REFLUX DISEASE, UNSPECIFIED WHETHER ESOPHAGITIS PRESENT: ICD-10-CM

## 2023-03-14 DIAGNOSIS — M54.50 CHRONIC BILATERAL LOW BACK PAIN WITHOUT SCIATICA: Primary | ICD-10-CM

## 2023-03-14 DIAGNOSIS — J01.90 ACUTE BACTERIAL SINUSITIS: Primary | ICD-10-CM

## 2023-03-14 DIAGNOSIS — G89.29 CHRONIC BILATERAL LOW BACK PAIN WITHOUT SCIATICA: Primary | ICD-10-CM

## 2023-03-14 DIAGNOSIS — G89.4 CHRONIC PAIN SYNDROME: ICD-10-CM

## 2023-03-14 DIAGNOSIS — M53.3 SI (SACROILIAC) JOINT DYSFUNCTION: ICD-10-CM

## 2023-03-14 DIAGNOSIS — M47.816 SPONDYLOSIS OF LUMBAR REGION WITHOUT MYELOPATHY OR RADICULOPATHY: ICD-10-CM

## 2023-03-14 DIAGNOSIS — I10 ESSENTIAL HYPERTENSION: Chronic | ICD-10-CM

## 2023-03-14 DIAGNOSIS — B96.89 ACUTE BACTERIAL SINUSITIS: Primary | ICD-10-CM

## 2023-03-14 DIAGNOSIS — M47.816 LUMBAR SPONDYLOSIS: Primary | ICD-10-CM

## 2023-03-14 PROCEDURE — 99999 PR PBB SHADOW E&M-EST. PATIENT-LVL III: ICD-10-PCS | Mod: PBBFAC,,, | Performed by: FAMILY MEDICINE

## 2023-03-14 PROCEDURE — 99999 PR PBB SHADOW E&M-EST. PATIENT-LVL IV: ICD-10-PCS | Mod: PBBFAC,,, | Performed by: STUDENT IN AN ORGANIZED HEALTH CARE EDUCATION/TRAINING PROGRAM

## 2023-03-14 PROCEDURE — 1160F RVW MEDS BY RX/DR IN RCRD: CPT | Mod: CPTII,S$GLB,, | Performed by: FAMILY MEDICINE

## 2023-03-14 PROCEDURE — 1159F PR MEDICATION LIST DOCUMENTED IN MEDICAL RECORD: ICD-10-PCS | Mod: CPTII,S$GLB,, | Performed by: FAMILY MEDICINE

## 2023-03-14 PROCEDURE — 3079F DIAST BP 80-89 MM HG: CPT | Mod: CPTII,S$GLB,, | Performed by: FAMILY MEDICINE

## 2023-03-14 PROCEDURE — 99214 PR OFFICE/OUTPT VISIT, EST, LEVL IV, 30-39 MIN: ICD-10-PCS | Mod: S$GLB,,, | Performed by: FAMILY MEDICINE

## 2023-03-14 PROCEDURE — 99999 PR PBB SHADOW E&M-EST. PATIENT-LVL III: CPT | Mod: PBBFAC,,, | Performed by: FAMILY MEDICINE

## 2023-03-14 PROCEDURE — 99204 OFFICE O/P NEW MOD 45 MIN: CPT | Mod: S$GLB,,, | Performed by: STUDENT IN AN ORGANIZED HEALTH CARE EDUCATION/TRAINING PROGRAM

## 2023-03-14 PROCEDURE — 3008F PR BODY MASS INDEX (BMI) DOCUMENTED: ICD-10-PCS | Mod: CPTII,S$GLB,, | Performed by: FAMILY MEDICINE

## 2023-03-14 PROCEDURE — 99204 PR OFFICE/OUTPT VISIT, NEW, LEVL IV, 45-59 MIN: ICD-10-PCS | Mod: S$GLB,,, | Performed by: STUDENT IN AN ORGANIZED HEALTH CARE EDUCATION/TRAINING PROGRAM

## 2023-03-14 PROCEDURE — 99999 PR PBB SHADOW E&M-EST. PATIENT-LVL IV: CPT | Mod: PBBFAC,,, | Performed by: STUDENT IN AN ORGANIZED HEALTH CARE EDUCATION/TRAINING PROGRAM

## 2023-03-14 PROCEDURE — 1159F MED LIST DOCD IN RCRD: CPT | Mod: CPTII,S$GLB,, | Performed by: FAMILY MEDICINE

## 2023-03-14 PROCEDURE — 99214 OFFICE O/P EST MOD 30 MIN: CPT | Mod: S$GLB,,, | Performed by: FAMILY MEDICINE

## 2023-03-14 PROCEDURE — 4010F ACE/ARB THERAPY RXD/TAKEN: CPT | Mod: CPTII,S$GLB,, | Performed by: FAMILY MEDICINE

## 2023-03-14 PROCEDURE — 1160F PR REVIEW ALL MEDS BY PRESCRIBER/CLIN PHARMACIST DOCUMENTED: ICD-10-PCS | Mod: CPTII,S$GLB,, | Performed by: FAMILY MEDICINE

## 2023-03-14 PROCEDURE — 4010F PR ACE/ARB THEARPY RXD/TAKEN: ICD-10-PCS | Mod: CPTII,S$GLB,, | Performed by: FAMILY MEDICINE

## 2023-03-14 PROCEDURE — 1125F PR PAIN SEVERITY QUANTIFIED, PAIN PRESENT: ICD-10-PCS | Mod: CPTII,S$GLB,, | Performed by: STUDENT IN AN ORGANIZED HEALTH CARE EDUCATION/TRAINING PROGRAM

## 2023-03-14 PROCEDURE — 1125F AMNT PAIN NOTED PAIN PRSNT: CPT | Mod: CPTII,S$GLB,, | Performed by: STUDENT IN AN ORGANIZED HEALTH CARE EDUCATION/TRAINING PROGRAM

## 2023-03-14 PROCEDURE — 3008F BODY MASS INDEX DOCD: CPT | Mod: CPTII,S$GLB,, | Performed by: FAMILY MEDICINE

## 2023-03-14 PROCEDURE — 3079F PR MOST RECENT DIASTOLIC BLOOD PRESSURE 80-89 MM HG: ICD-10-PCS | Mod: CPTII,S$GLB,, | Performed by: FAMILY MEDICINE

## 2023-03-14 PROCEDURE — 4010F ACE/ARB THERAPY RXD/TAKEN: CPT | Mod: CPTII,S$GLB,, | Performed by: STUDENT IN AN ORGANIZED HEALTH CARE EDUCATION/TRAINING PROGRAM

## 2023-03-14 PROCEDURE — 1159F PR MEDICATION LIST DOCUMENTED IN MEDICAL RECORD: ICD-10-PCS | Mod: CPTII,S$GLB,, | Performed by: STUDENT IN AN ORGANIZED HEALTH CARE EDUCATION/TRAINING PROGRAM

## 2023-03-14 PROCEDURE — 3077F SYST BP >= 140 MM HG: CPT | Mod: CPTII,S$GLB,, | Performed by: FAMILY MEDICINE

## 2023-03-14 PROCEDURE — 3079F PR MOST RECENT DIASTOLIC BLOOD PRESSURE 80-89 MM HG: ICD-10-PCS | Mod: CPTII,S$GLB,, | Performed by: STUDENT IN AN ORGANIZED HEALTH CARE EDUCATION/TRAINING PROGRAM

## 2023-03-14 PROCEDURE — 3077F PR MOST RECENT SYSTOLIC BLOOD PRESSURE >= 140 MM HG: ICD-10-PCS | Mod: CPTII,S$GLB,, | Performed by: FAMILY MEDICINE

## 2023-03-14 PROCEDURE — 3077F SYST BP >= 140 MM HG: CPT | Mod: CPTII,S$GLB,, | Performed by: STUDENT IN AN ORGANIZED HEALTH CARE EDUCATION/TRAINING PROGRAM

## 2023-03-14 PROCEDURE — 3008F BODY MASS INDEX DOCD: CPT | Mod: CPTII,S$GLB,, | Performed by: STUDENT IN AN ORGANIZED HEALTH CARE EDUCATION/TRAINING PROGRAM

## 2023-03-14 PROCEDURE — 1159F MED LIST DOCD IN RCRD: CPT | Mod: CPTII,S$GLB,, | Performed by: STUDENT IN AN ORGANIZED HEALTH CARE EDUCATION/TRAINING PROGRAM

## 2023-03-14 PROCEDURE — 3077F PR MOST RECENT SYSTOLIC BLOOD PRESSURE >= 140 MM HG: ICD-10-PCS | Mod: CPTII,S$GLB,, | Performed by: STUDENT IN AN ORGANIZED HEALTH CARE EDUCATION/TRAINING PROGRAM

## 2023-03-14 PROCEDURE — 3008F PR BODY MASS INDEX (BMI) DOCUMENTED: ICD-10-PCS | Mod: CPTII,S$GLB,, | Performed by: STUDENT IN AN ORGANIZED HEALTH CARE EDUCATION/TRAINING PROGRAM

## 2023-03-14 PROCEDURE — 3079F DIAST BP 80-89 MM HG: CPT | Mod: CPTII,S$GLB,, | Performed by: STUDENT IN AN ORGANIZED HEALTH CARE EDUCATION/TRAINING PROGRAM

## 2023-03-14 PROCEDURE — 4010F PR ACE/ARB THEARPY RXD/TAKEN: ICD-10-PCS | Mod: CPTII,S$GLB,, | Performed by: STUDENT IN AN ORGANIZED HEALTH CARE EDUCATION/TRAINING PROGRAM

## 2023-03-14 RX ORDER — METHOCARBAMOL 500 MG/1
500 TABLET, FILM COATED ORAL 4 TIMES DAILY PRN
Qty: 40 TABLET | Refills: 0 | Status: SHIPPED | OUTPATIENT
Start: 2023-03-14 | End: 2023-03-24

## 2023-03-14 RX ORDER — OMEPRAZOLE 20 MG/1
20 CAPSULE, DELAYED RELEASE ORAL DAILY
Qty: 30 CAPSULE | Refills: 2 | Status: SHIPPED | OUTPATIENT
Start: 2023-03-14 | End: 2023-06-08 | Stop reason: SDUPTHER

## 2023-03-14 RX ORDER — AMOXICILLIN AND CLAVULANATE POTASSIUM 875; 125 MG/1; MG/1
1 TABLET, FILM COATED ORAL 2 TIMES DAILY
Qty: 20 TABLET | Refills: 0 | Status: SHIPPED | OUTPATIENT
Start: 2023-03-14 | End: 2023-05-09

## 2023-03-14 NOTE — PROGRESS NOTES
"[facility] Established Patient Evaluation    Chief Complaint  No chief complaint on file.      Last 3 PDI Scores 3/14/2023 9/16/2020 4/24/2019   Pain Disability Index (PDI) 22 56 14         Interval History (03/14/2023):  Bhavna Landry returns today for low back pain that radiates into the b/l thighs.  Pain is described as clenching pain that has worsened over last 2 years. No trauma or surgery.  Pain is worse with extension.  Pain is better with heat, lying down, gabapentin, tylenol and tramadol.  She is scheduled to start PT next week.  Currently, the bilateral hip and bilateral leg pain is stable.  Avoid NSAIDs due to history of gastric ulcer.     Current Pain Scales:  Current: 7/10              Typical Range: 7-8/10     Background from Chart Review  9/16/2020- Mrs. Landry presents to clinic today reporting left hip pain for the past 3-4 mos, which has not improved with PT.  Pain starts in the lateral left hip "on the bone" and radiates through the buttock, down the lateral thigh not passing the knee, and into the anterolateral thigh.  She endorses regular stretching and states that this pain is distinctly different from the SI pain for which she received SI RFA in the past.     6/09/2020-   64-year-old female presents status post left then right  DR 5+ lateral branches of the S1, S2 and S3 RFA with reported 100% continued  relief she reports that her pain score today is 0/10.  She reports improvement with her ADLs and overall improvement of her functionality.     05/05/2020  presents tele-medicine appointment for a follow-up appointment for low back, left groin and bilateral hip pain.  Since the last visit, Bhavna Landry states the pain has been persistant. Current pain intensity is 9/10.  Patient reports onset of pain 2 weeks, patient states bending forward and sitting for long periods of time increase her pain.  Pain is described as aching.  She reports no radicular symptoms in either leg.  Worst " pain is not out of 10.  Patient is status post right and left L5 Dorsal Ramus and Lateral Branches of S1, S2, and S3 (4 levels) RFA reported 80-90% relief for a minimal 6 months.  Pain is now returned and patient like to reschedule procedure.        Treatment History  PT/OT: yes  HEP: yes  TENS:  Injections:   Bilateral SI joint injections, bilateral  Dorsal ramus block(DR5, S1,S2,S3),  bilateral L5 Dorsal Ramus and Lateral Branches of S1, S2, and S3 (4 levels) RFA, GTB injections   Surgery:  Medications:   - NSAIDS: avoid due to gastric ulcer   - MSK Relaxants:   - TCAs:   - SNRIs: Venlafaxine  - Topicals: Voltaren  - Opioids: Tramadol   - Anticonvulsants: Gabapentin    Current Pain Medications  Gabapentin  Tramadol      Full Medication List    Current Outpatient Medications:     albuterol (PROAIR HFA) 90 mcg/actuation inhaler, 2 puffs qid prn, Disp: 54 g, Rfl: 3    amoxicillin-clavulanate 875-125mg (AUGMENTIN) 875-125 mg per tablet, Take 1 tablet by mouth 2 (two) times daily., Disp: 20 tablet, Rfl: 0    anastrozole (ARIMIDEX) 1 mg Tab, Take 1 tablet (1 mg total) by mouth once daily., Disp: 90 tablet, Rfl: 3    ascorbic acid, vitamin C, (VITAMIN C) 500 MG tablet, Take 500 mg by mouth once daily., Disp: , Rfl:     aspirin (ECOTRIN) 81 MG EC tablet, Take 81 mg by mouth once daily., Disp: , Rfl:     atorvastatin (LIPITOR) 80 MG tablet, Take 1 tablet (80 mg total) by mouth once daily., Disp: 90 tablet, Rfl: 3    benztropine (COGENTIN) 0.5 MG tablet, Take 1 tablet (0.5 mg total) by mouth 2 (two) times daily., Disp: 180 tablet, Rfl: 3    cloNIDine (CATAPRES) 0.1 MG tablet, 1 tab po q day for systolic BP > 160 or DBP >100., Disp: 30 tablet, Rfl: 1    cyanocobalamin 500 MCG tablet, Take 500 mcg by mouth once daily., Disp: , Rfl:     diclofenac sodium (VOLTAREN) 1 % Gel, Apply 2 g topically once daily., Disp: 200 g, Rfl: 0    ferrous sulfate 325 (65 FE) MG EC tablet, Take 1 tablet (325 mg total) by mouth once daily., Disp:  1 tablet, Rfl: 0    folic acid (FOLVITE) 1 MG tablet, Take 1 tablet (1,000 mcg total) by mouth once daily., Disp: 90 tablet, Rfl: 3    gabapentin (NEURONTIN) 100 MG capsule, Take 1 capsule (100 mg total) by mouth 2 (two) times daily as needed (anxiety)., Disp: 180 capsule, Rfl: 3    gabapentin (NEURONTIN) 300 MG capsule, Take 1 capsule (300 mg total) by mouth every evening., Disp: 90 capsule, Rfl: 3    isosorbide mononitrate (IMDUR) 30 MG 24 hr tablet, TAKE 1 TABLET(30 MG) BY MOUTH EVERY DAY, Disp: 30 tablet, Rfl: 11    lisinopriL 10 MG tablet, Take 1 tablet (10 mg total) by mouth once daily., Disp: 90 tablet, Rfl: 3    LUTEIN ORAL, Take by mouth., Disp: , Rfl:     methotrexate 2.5 MG Tab, Take 6 tablets (15 mg total) by mouth every 7 days. Take 3 tabs Mon am and 3 tabs Mon pm, Disp: 24 tablet, Rfl: 2    multivitamin (THERAGRAN) per tablet, Take 1 tablet by mouth once daily., Disp: , Rfl:     omega-3 fatty acids/fish oil (FISH OIL-OMEGA-3 FATTY ACIDS) 300-1,000 mg capsule, Take by mouth once daily., Disp: , Rfl:     omeprazole (PRILOSEC) 20 MG capsule, Take 1 capsule (20 mg total) by mouth once daily., Disp: 30 capsule, Rfl: 2    risperiDONE (RISPERDAL) 2 MG tablet, TAKE 1 TABLET(2 MG) BY MOUTH EVERY MORNING, Disp: 90 tablet, Rfl: 3    risperiDONE (RISPERDAL) 4 MG tablet, Take 1 tablet (4 mg total) by mouth every evening., Disp: 90 tablet, Rfl: 3    tofacitinib citrate (XELJANZ ORAL), Take by mouth., Disp: , Rfl:     traMADoL (ULTRAM) 50 mg tablet, TAKE 1 TABLET BY MOUTH EVERY 12 HOURS AS NEEDED FOR PAIN, Disp: 30 tablet, Rfl: 0    venlafaxine (EFFEXOR-XR) 75 MG 24 hr capsule, Take 1 capsule (75 mg total) by mouth once daily., Disp: 90 capsule, Rfl: 3    VITAMIN A ORAL, Take by mouth., Disp: , Rfl:     vitamin D (VITAMIN D3) 1000 units Tab, Take 1,000 Units by mouth once daily., Disp: , Rfl:     vitamin E 200 UNIT capsule, Take 200 Units by mouth once daily., Disp: , Rfl:     zinc gluconate 50 mg tablet, Take 50 mg  by mouth once daily., Disp: , Rfl:     amLODIPine (NORVASC) 5 MG tablet, Take 1 tablet (5 mg total) by mouth every evening., Disp: 90 tablet, Rfl: 1    methocarbamoL (ROBAXIN) 500 MG Tab, Take 1 tablet (500 mg total) by mouth 4 (four) times daily as needed (muscle spasm or pain)., Disp: 40 tablet, Rfl: 0  No current facility-administered medications for this visit.    Facility-Administered Medications Ordered in Other Visits:     tropicamide 1% ophthalmic solution 1 drop, 1 drop, Right Eye, On Call Procedure, Karen Song MD, 1 drop at 08/01/19 0648     Review of Systems  ROS  REVIEW OF SYSTEMS:    GENERAL:  No weight loss, malaise or fevers.  HEENT:   No recent changes in vision or hearing  NECK:  No difficulty with swallowing. No stridor.   RESPIRATORY:  Negative for cough, wheezing or shortness of breath, patient denies any recent URI.  CARDIOVASCULAR:  Negative for chest pain, leg swelling or palpitations. +HTN  GI:  Negative for abdominal discomfort, blood in stools or black stools or change in bowel habits.  MUSCULOSKELETAL:  See HPI.  SKIN:  Negative for lesions, rash, and itching.  PSYCH:  No mood disorder or recent psychosocial stressors.   +anxiety +depression   HEMATOLOGY/LYMPHOLOGY:  Negative for prolonged bleeding, bruising easily or swollen nodes.  Patient is not currently taking any anti-coagulants  NEURO:   No history of headaches, syncope, paralysis, seizures or tremors.  All other reviewed and negative other than HPI.      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, estrogen receptor positive 4/13/2022    Cataract     Depression     Dry eyes     Dry mouth     Duane's syndrome of right eye     Early dry stage nonexudative age-related macular degeneration of both eyes 9/20/2022    Fibromyalgia 4/17/2014    Fibromyalgia      Fractured hip     RIGHT HIP    GERD (gastroesophageal reflux disease)     History of psychiatric hospitalization     Hyperlipidemia 1992    Hypertension     Hypocalcemia     Hyponatremia     Kidney stone     Migraine headache     Osteoporosis     Psoriatic arthritis 1992    Right knee pain     post knee replacement surgery (possible rejectiion of metal)    RLS (restless legs syndrome)     Schizophrenia 1992    stable on meds    Squamous cell carcinoma of skin     Urinary tract infection         Surgical History  Past Surgical History:   Procedure Laterality Date    brow ptosis repair Right 2019    Surgeon: Dr. Karla Henson    CATARACT EXTRACTION       SECTION      COLONOSCOPY N/A 2016    Procedure: COLONOSCOPY;  Surgeon: ANDRIA Connelly MD;  Location: [facility] ([facility]);  Service: Endoscopy;  Laterality: N/A;    COLONOSCOPY N/A 2022    Procedure: COLONOSCOPY;  Surgeon: Mikey Walters MD;  Location: [facility];  Service: Endoscopy;  Laterality: N/A;    cyst removed from right sinus  1982    ESOPHAGOGASTRODUODENOSCOPY N/A 2023    Procedure: EGD (ESOPHAGOGASTRODUODENOSCOPY);  Surgeon: Dougie Shea MD;  Location: [facility];  Service: Endoscopy;  Laterality: N/A;    HYSTERECTOMY      VAGINAL HYSTERECTOMY WITHOUT BSO - ENDOMETRIOSIS    INJECTION FOR SENTINEL NODE IDENTIFICATION Right 2022    Procedure: INJECTION, FOR SENTINEL NODE IDENTIFICATION-Right;  Surgeon: NEAL Dhillon MD;  Location: [facility] OR;  Service: General;  Laterality: Right;    INJECTION OF ANESTHETIC AGENT AROUND MEDIAL BRANCH NERVES INNERVATING LUMBAR FACET JOINT N/A 2019    Procedure: Lumbo-sacral Block, DR5 and Lateral Branches of S1,S2, S3;  Surgeon: Michelle Pineda Jr., MD;  Location: [facility];  Service: Pain Management;  Laterality: N/A;  Pt takes  and states she holds ASA on her own whenever she has procedures.  Instructed to hold x 3 days prior to procedure.       INJECTION OF ANESTHETIC AGENT INTO SACROILIAC JOINT Right 8/30/2018    Procedure: BLOCK, SACROILIAC JOINT-Right- ORAL SEDATION;  Surgeon: Michelle Pineda Jr., MD;  Location: [facility]GT;  Service: Pain Management;  Laterality: Right;    INJECTION OF ANESTHETIC AGENT INTO SACROILIAC JOINT Bilateral 9/27/2018    Procedure: BLOCK, SACROILIAC JOINT-BILATERAL;  Surgeon: Michelle Pineda Jr., MD;  Location: [facility];  Service: Pain Management;  Laterality: Bilateral;  Please keep at 10:00 due to trasnsportation    INJECTION OF ANESTHETIC AGENT INTO SACROILIAC JOINT Bilateral 2/7/2019    Procedure: Bilateral Sacroiliac Joint Injection - Per Dr Pineda, not necessary to hold ASA.;  Surgeon: Michelle Pineda Jr., MD;  Location: [facility];  Service: Pain Management;  Laterality: Bilateral;    INTRAOCULAR PROSTHESES INSERTION Right 8/1/2019    Procedure: INSERTION, IOL PROSTHESIS;  Surgeon: Karen Song MD;  Location: [address];  Service: Ophthalmology;  Laterality: Right;    INTRAOCULAR PROSTHESES INSERTION Left 9/26/2019    Procedure: INSERTION, IOL PROSTHESIS;  Surgeon: Karen Song MD;  Location: [address];  Service: Ophthalmology;  Laterality: Left;    JOINT REPLACEMENT Right     knee    KNEE SURGERY      MASTECTOMY Right 5/9/2022    Procedure: MASTECTOMY-Right;  Surgeon: NEAL Dhillon MD;  Location: [facility];  Service: General;  Laterality: Right;  2.5 HOURS    PHACOEMULSIFICATION OF CATARACT Right 8/1/2019    Procedure: PHACOEMULSIFICATION, CATARACT;  Surgeon: Karen Song MD;  Location: [address];  Service: Ophthalmology;  Laterality: Right;    PHACOEMULSIFICATION OF CATARACT Left 9/26/2019    Procedure: PHACOEMULSIFICATION, CATARACT;  Surgeon: Karen Song MD;  Location: [facility] OR [address] [place] [address];  Service: Ophthalmology;  Laterality: Left;    RADIOFREQUENCY THERMOCOAGULATION Right 5/7/2019    Procedure: RADIOFREQUENCY THERMAL COAGULATION RIGHT DORSAL RAMUS 5 AND LATERAL  BRANCH OF S1, S2 AND S3;  Surgeon: Michelle Pineda Jr., MD;  Location: [facility];  Service: Pain Management;  Laterality: Right;    RADIOFREQUENCY THERMOCOAGULATION Left 5/14/2019    Procedure: RADIOFREQUENCY THERMAL COAGULATION LEFT DORSAL RAMUS 5 AND LATERAL BRANCH OF S1,S2 AND S3;  Surgeon: Michelle Pineda Jr., MD;  Location: [facility];  Service: Pain Management;  Laterality: Left;    RADIOFREQUENCY THERMOCOAGULATION Right 10/22/2019    Procedure: RADIOFREQUENCY THERMAL COAGULATION---DARSAL RAMUS 5 and LATERAL S1,S2,and S3 Right;  Surgeon: Michelle Pineda Jr., MD;  Location: [facility];  Service: Pain Management;  Laterality: Right;  patient to sign consent DOS    RADIOFREQUENCY THERMOCOAGULATION Left 10/29/2019    Procedure: RADIOFREQUENCY THERMAL COAGULATION - LEFT - DR5, S1,S2, AND S3;  Surgeon: Michelle Pineda Jr., MD;  Location: [facility];  Service: Pain Management;  Laterality: Left;    RADIOFREQUENCY THERMOCOAGULATION Left 5/26/2020    Procedure: RADIOFREQUENCY THERMAL COAGULATION--Left DR5+ lateral branches of S1, S2, S3;  Surgeon: Michelle Pineda Jr., MD;  Location: [facility];  Service: Pain Management;  Laterality: Left;    RADIOFREQUENCY THERMOCOAGULATION Right 6/2/2020    Procedure: RADIOFREQUENCY THERMAL COAGULATION--Right DR5+ lateral branches of S1, S2, S3;  Surgeon: Michelle Pineda Jr., MD;  Location: [facility];  Service: Pain Management;  Laterality: Right;    SENTINEL LYMPH NODE BIOPSY Right 5/9/2022    Procedure: BIOPSY, LYMPH NODE, SENTINEL-Right;  Surgeon: NEAL Dhillon MD;  Location: [facility];  Service: General;  Laterality: Right;    Surgery on right knee  07/09/1982    tumor removed from back left side upper shoulder  03/17/2006        Physical Exam  Vitals:    03/14/23 1409   BP: (!) 143/81   Pulse: 83   Weight: 45 kg (99 lb 3.3 oz)   PainSc:   7     Ortho/SPM Exam  PHYSICAL EXAMINATION:    GENERAL: Well appearing, in no acute distress, alert and  oriented x3.  PSYCH:  Mood and affect appropriate.  SKIN: Skin color, texture, turgor normal, no rashes or lesions.  HEAD/FACE:  Normocephalic, atraumatic. Cranial nerves grossly intact.  NECK: Normal ROM. Supple.   CV: RRR with palpation of the radial artery.  PULM: No evidence of respiratory difficulty, symmetric chest rise.  GI:  Soft and non-distended.  MSK: Straight leg raising is  negative to radicular pain. There is pain to palpation over the facet joints of the lumbar spine. There is pain with lumbar facet loading. There is pain over the SI joints. Normal range of motion without pain reproduction with lumbar flexion. Pain with extension.  Peripheral joint ROM is full and pain free without obvious instability or laxity in all four extremities. No deformities, edema, or skin discoloration.  No atrophy or tone abnormalities are noted.   NEURO: Bilateral upper and lower extremity coordination and strength is symmetric.  No loss of sensation is noted.  MENTAL STATUS: A x O x 3, good concentration, speech is fluent and goal directed  MOTOR: 5/5 in all muscle groups  GAIT: normal.    Imaging  MRI LUMBAR SPINE WITHOUT CONTRAST     CLINICAL HISTORY:  low back pain; Sacrococcygeal disorders, not elsewhere classified     TECHNIQUE:  Multiplanar, multisequence MR images were acquired from the thoracolumbar junction to the sacrum without contrast.     COMPARISON:  03/06/2023     FINDINGS:  Alignment: Normal.     Vertebrae: Degenerative endplate changes throughout the lumbar spine.     Discs: Moderate to severe disc height loss throughout the lumbar spine.  No evidence for discitis.     Cord: Conus terminates at L2 and appears unremarkable.  Probable thin fatty filum terminale noted.     Degenerative findings:     T12-L1: Small central protrusion.  No spinal canal stenosis or neural foraminal narrowing.     L1-L2: Circumferential disc bulge with right paracentral/foraminal protrusion result in mild effacement of the  thecal sac and moderate right neural foraminal narrowing.     L2-L3: Circumferential disc bulge.  No spinal canal stenosis or neural foraminal narrowing.     L3-L4: Circumferential disc bulge with left paracentral disc extrusion and moderate facet arthropathy result in effacement of the left lateral recess and severe left neural foraminal narrowing.     L4-L5: Circumferential disc bulge and mild facet arthropathy result in mild effacement of the thecal sac and moderate left, mild right neural foraminal narrowing.     L5-S1: Circumferential disc bulge and moderate facet arthropathy result in mild effacement of the thecal sac and severe right, moderate left neural foraminal narrowing.     Paraspinal muscles & soft tissues: Mild paraspinal muscle atrophy.  Questionable gallbladder wall thickening and small stones.     Impression:     1. Multilevel degenerative changes of the lumbar spine detailed above.  Moderate to severe neural foraminal narrowing at L1-L2 and L3-S1.  2. Questionable gallbladder wall thickening and small stones.  Recommend further evaluation with abdominal ultrasound.        Electronically signed by: Akash Black MD  Date:                                            03/12/2023      Labs:  BMP  Lab Results   Component Value Date     02/14/2023    K 4.5 02/14/2023     02/14/2023    CO2 28 02/14/2023    BUN 6 (L) 02/14/2023    CREATININE 0.8 02/14/2023    CALCIUM 9.1 02/14/2023    ANIONGAP 6 (L) 02/14/2023    EGFRNORACEVR >60.0 02/14/2023     Lab Results   Component Value Date    ALT 19 02/14/2023    AST 32 02/14/2023    GGT 91 (H) 07/28/2014    ALKPHOS 71 02/14/2023    BILITOT 0.3 02/14/2023       Assessment:  Problem List Items Addressed This Visit    None  Visit Diagnoses       SI (sacroiliac) joint dysfunction        Spondylosis of lumbar region without myelopathy or radiculopathy              03/14/2023 -Bhavna MEJÍA Gris is a 67 y.o. 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, estrogen receptor positive (4/13/2022), Cataract, Depression, Dry eyes, Dry mouth, Duane's syndrome of right eye, Early dry stage nonexudative age-related macular degeneration of both eyes (9/20/2022), Fibromyalgia (4/17/2014), Fibromyalgia, Fractured hip, GERD (gastroesophageal reflux disease), History of psychiatric hospitalization, Hyperlipidemia (02/02/1992), Hypertension, Hypocalcemia, Hyponatremia, Kidney stone, Migraine headache, Osteoporosis, Psoriatic arthritis (02/02/1992), Right knee pain, RLS (restless legs syndrome), Schizophrenia (02/02/1992), Squamous cell carcinoma of skin, and Urinary tract infection.  By history and examination this patient has chronic low back pain without radiculopathy.  The underlying cause cause is facet arthritis and deconditioning.  Pathology is confirmed by imaging.  We discussed the underlying diagnoses and multiple treatment options including non-opioid medications, interventional procedures, physical therapy, and home exercise.  The risks and benefits of each treatment option were discussed and all questions were answered.        Plan & Recommendations  Procedures: Schedule for L3, L4, L5 B/l MBB diagnostic blocks. She will need to wait 2 weeks after finishing her antibiotics for recent sinus infection.  Medications: I will prescribe Robaxin 500 mg QID prn for her muscle spasms   Imaging: No additional imaging required at this time.  PT/OT/HEP: I encouraged the patient to maintain a home exercise regimen that includes daily, moderate cardiovascular exercise lasting at least 30 minutes.  This may include yoga, gela chi, walking, swimming, aqua aerobics, or other exercises that maintain a heart rate of 50-70% of the calculated maximum heart rate.  I also encouraged light, daily stretching focused on the target area.  Follow Up: RTC 1-2 weeks  post procedure     Maile Storm, DO   [facility]      Disclaimer: This note was partly generated using dictation software which may occasionally result in transcription errors.

## 2023-03-14 NOTE — PROGRESS NOTES
Subjective:       Patient ID: Bhavna Landry is a 67 y.o. female.    Chief Complaint: Sinusitis    67 yr old pleasant female with migraine, RA, PAF, right breast carcinoma, and other co morbidities presents today for evaluation of sinus congestion x 2-4 weeks. Awaiting ENT appointment. She tried all home remedies and also sprays and nothing works. She has sinus headache. No fever but feels warm. Details as follows -      Sinusitis  This is a recurrent problem. The current episode started more than 1 month ago. The problem has been gradually worsening since onset. There has been no fever. Her pain is at a severity of 4/10. The pain is moderate. Associated symptoms include congestion, coughing and sinus pressure. Pertinent negatives include no diaphoresis, headaches, shortness of breath or sore throat. Past treatments include antibiotics, saline nose sprays and lying down. The treatment provided no relief.   Review of Systems   Constitutional: Negative.  Negative for activity change, diaphoresis and unexpected weight change.   HENT:  Positive for nasal congestion, postnasal drip and sinus pressure/congestion. Negative for ear discharge, hearing loss, rhinorrhea, sore throat and voice change.    Eyes: Negative.  Negative for pain, discharge and visual disturbance.   Respiratory:  Positive for cough. Negative for chest tightness, shortness of breath and wheezing.    Cardiovascular: Negative.  Negative for chest pain.   Gastrointestinal: Negative.  Negative for abdominal distention, anal bleeding, constipation and nausea.   Endocrine: Negative.  Negative for cold intolerance, polydipsia and polyuria.   Genitourinary: Negative.  Negative for decreased urine volume, difficulty urinating, dysuria, frequency, menstrual problem and vaginal pain.   Musculoskeletal: Negative.  Negative for arthralgias, gait problem and myalgias.   Integumentary:  Negative for color change, pallor and wound. Negative.   Allergic/Immunologic:  Negative.  Negative for environmental allergies and immunocompromised state.   Neurological: Negative.  Negative for dizziness, tremors, seizures, speech difficulty and headaches.   Hematological: Negative.  Negative for adenopathy. Does not bruise/bleed easily.   Psychiatric/Behavioral: Negative.  Negative for agitation, confusion, decreased concentration, hallucinations, self-injury and suicidal ideas. The patient is not nervous/anxious.        Past Medical History:   Diagnosis Date    Allergy     Amblyopia     Anemia     Anticoagulant long-term use     Arthritis 1992    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, estrogen receptor positive 2022    Cataract     Depression     Dry eyes     Dry mouth     Duane's syndrome of right eye     Early dry stage nonexudative age-related macular degeneration of both eyes 2022    Fibromyalgia 2014    Fibromyalgia     Fractured hip     RIGHT HIP    GERD (gastroesophageal reflux disease)     History of psychiatric hospitalization     Hyperlipidemia 1992    Hypertension     Hypocalcemia     Hyponatremia     Kidney stone     Migraine headache     Osteoporosis     Psoriatic arthritis 1992    Right knee pain     post knee replacement surgery (possible rejectiion of metal)    RLS (restless legs syndrome)     Schizophrenia 1992    stable on meds    Squamous cell carcinoma of skin     Urinary tract infection        Past Surgical History:   Procedure Laterality Date    brow ptosis repair Right 2019    Surgeon: Dr. Karla Henson    CATARACT EXTRACTION       SECTION      COLONOSCOPY N/A 2016    Procedure: COLONOSCOPY;  Surgeon: ANDRIA Connelly MD;  Location: [facility] ([address] [place] [address]);  Service: Endoscopy;  Laterality: N/A;    COLONOSCOPY N/A 2022    Procedure: COLONOSCOPY;  Surgeon: Mikey Walters MD;  Location: [facility] ENDO;  Service: Endoscopy;  Laterality: N/A;    cyst removed from right sinus  1982     ESOPHAGOGASTRODUODENOSCOPY N/A 2/7/2023    Procedure: EGD (ESOPHAGOGASTRODUODENOSCOPY);  Surgeon: Dougie Shea MD;  Location: [facility] ENDO;  Service: Endoscopy;  Laterality: N/A;    HYSTERECTOMY      VAGINAL HYSTERECTOMY WITHOUT BSO - ENDOMETRIOSIS    INJECTION FOR SENTINEL NODE IDENTIFICATION Right 5/9/2022    Procedure: INJECTION, FOR SENTINEL NODE IDENTIFICATION-Right;  Surgeon: NEAL Dhillon MD;  Location: [facility];  Service: General;  Laterality: Right;    INJECTION OF ANESTHETIC AGENT AROUND MEDIAL BRANCH NERVES INNERVATING LUMBAR FACET JOINT N/A 4/11/2019    Procedure: Lumbo-sacral Block, DR5 and Lateral Branches of S1,S2, S3;  Surgeon: Michelle Pineda Jr., MD;  Location: [facility] PAIN [facility];  Service: Pain Management;  Laterality: N/A;  Pt takes  and states she holds ASA on her own whenever she has procedures.  Instructed to hold x 3 days prior to procedure.      INJECTION OF ANESTHETIC AGENT INTO SACROILIAC JOINT Right 8/30/2018    Procedure: BLOCK, SACROILIAC JOINT-Right- ORAL SEDATION;  Surgeon: Michelle Pineda Jr., MD;  Location: [facility];  Service: Pain Management;  Laterality: Right;    INJECTION OF ANESTHETIC AGENT INTO SACROILIAC JOINT Bilateral 9/27/2018    Procedure: BLOCK, SACROILIAC JOINT-BILATERAL;  Surgeon: Michelle Pineda Jr., MD;  Location: [facility];  Service: Pain Management;  Laterality: Bilateral;  Please keep at 10:00 due to trasnsportation    INJECTION OF ANESTHETIC AGENT INTO SACROILIAC JOINT Bilateral 2/7/2019    Procedure: Bilateral Sacroiliac Joint Injection - Per Dr Pineda, not necessary to hold ASA.;  Surgeon: Michelle Pineda Jr., MD;  Location: [facility];  Service: Pain Management;  Laterality: Bilateral;    INTRAOCULAR PROSTHESES INSERTION Right 8/1/2019    Procedure: INSERTION, IOL PROSTHESIS;  Surgeon: Karen Song MD;  Location: [address];  Service: Ophthalmology;  Laterality: Right;    INTRAOCULAR PROSTHESES INSERTION Left 9/26/2019     Procedure: INSERTION, IOL PROSTHESIS;  Surgeon: Karen Song MD;  Location: [address];  Service: Ophthalmology;  Laterality: Left;    JOINT REPLACEMENT Right     knee    KNEE SURGERY      MASTECTOMY Right 5/9/2022    Procedure: MASTECTOMY-Right;  Surgeon: NEAL Dhillon MD;  Location: [facility];  Service: General;  Laterality: Right;  2.5 HOURS    PHACOEMULSIFICATION OF CATARACT Right 8/1/2019    Procedure: PHACOEMULSIFICATION, CATARACT;  Surgeon: Karen Song MD;  Location: [address];  Service: Ophthalmology;  Laterality: Right;    PHACOEMULSIFICATION OF CATARACT Left 9/26/2019    Procedure: PHACOEMULSIFICATION, CATARACT;  Surgeon: Karen Song MD;  Location: [address];  Service: Ophthalmology;  Laterality: Left;    RADIOFREQUENCY THERMOCOAGULATION Right 5/7/2019    Procedure: RADIOFREQUENCY THERMAL COAGULATION RIGHT DORSAL RAMUS 5 AND LATERAL BRANCH OF S1, S2 AND S3;  Surgeon: Michelle Pineda Jr., MD;  Location: [facility];  Service: Pain Management;  Laterality: Right;    RADIOFREQUENCY THERMOCOAGULATION Left 5/14/2019    Procedure: RADIOFREQUENCY THERMAL COAGULATION LEFT DORSAL RAMUS 5 AND LATERAL BRANCH OF S1,S2 AND S3;  Surgeon: Michelle Pineda Jr., MD;  Location: [facility];  Service: Pain Management;  Laterality: Left;    RADIOFREQUENCY THERMOCOAGULATION Right 10/22/2019    Procedure: RADIOFREQUENCY THERMAL COAGULATION---DARSAL RAMUS 5 and LATERAL S1,S2,and S3 Right;  Surgeon: Michelle Pineda Jr., MD;  Location: [facility];  Service: Pain Management;  Laterality: Right;  patient to sign consent DOS    RADIOFREQUENCY THERMOCOAGULATION Left 10/29/2019    Procedure: RADIOFREQUENCY THERMAL COAGULATION - LEFT - DR5, S1,S2, AND S3;  Surgeon: Michelle Pineda Jr., MD;  Location: [facility];  Service: Pain Management;  Laterality: Left;    RADIOFREQUENCY THERMOCOAGULATION Left 5/26/2020    Procedure: RADIOFREQUENCY THERMAL COAGULATION--Left DR5+ lateral branches of  S1, S2, S3;  Surgeon: Michelle Pineda Jr., MD;  Location: [facility];  Service: Pain Management;  Laterality: Left;    RADIOFREQUENCY THERMOCOAGULATION Right 2020    Procedure: RADIOFREQUENCY THERMAL COAGULATION--Right DR5+ lateral branches of S1, S2, S3;  Surgeon: Michelle Pineda Jr., MD;  Location: [facility];  Service: Pain Management;  Laterality: Right;    SENTINEL LYMPH NODE BIOPSY Right 2022    Procedure: BIOPSY, LYMPH NODE, SENTINEL-Right;  Surgeon: NEAL Dhillon MD;  Location: [facility] OR;  Service: General;  Laterality: Right;    Surgery on right knee  1982    tumor removed from back left side upper shoulder  2006       Family History   Problem Relation Age of Onset    Colon cancer Mother     Psoriasis Mother     Cancer Mother         colon    Depression Mother     Cancer Father         lymphoma    Stroke Sister     Diabetes Brother     Arthritis Brother     Heart disease Brother         cad    No Known Problems Daughter     Hypertension Neg Hx     Suicide Neg Hx     Amblyopia Neg Hx     Blindness Neg Hx     Cataracts Neg Hx     Glaucoma Neg Hx     Macular degeneration Neg Hx     Retinal detachment Neg Hx     Strabismus Neg Hx        Social History     Socioeconomic History    Marital status:     Number of children: 1   Occupational History    Occupation: retired [employer]  [address]     Employer: 2010   Tobacco Use    Smoking status: Former     Packs/day: 0.25     Years: 10.00     Pack years: 2.50     Types: Cigarettes     Quit date: 1/10/2023     Years since quittin.1     Passive exposure: Never    Smokeless tobacco: Former    Tobacco comments:     about 5 cig a day   Substance and Sexual Activity    Alcohol use: No    Drug use: No    Sexual activity: Not Currently     Partners: Male     Birth control/protection: Post-menopausal   Social History Narrative    Exercise:  Childcare.        Ett:  3 days a week        Daughter and her 3 kids live with  her.         Social Determinants of Health     Financial Resource Strain: Low Risk     Difficulty of Paying Living Expenses: Not hard at all   Food Insecurity: No Food Insecurity    Worried About Running Out of Food in the Last Year: Never true    Ran Out of Food in the Last Year: Never true   Transportation Needs: No Transportation Needs    Lack of Transportation (Medical): No    Lack of Transportation (Non-Medical): No   Physical Activity: Sufficiently Active    Days of Exercise per Week: 5 days    Minutes of Exercise per Session: 40 min   Stress: No Stress Concern Present    Feeling of Stress : Not at all   Social Connections: Socially Isolated    Frequency of Communication with Friends and Family: More than three times a week    Frequency of Social Gatherings with Friends and Family: More than three times a week    Attends Spiritism Services: Never    Active Member of Clubs or Organizations: No    Marital Status:    Housing Stability: Low Risk     Unable to Pay for Housing in the Last Year: No    Number of Places Lived in the Last Year: 1    Unstable Housing in the Last Year: No       Current Outpatient Medications   Medication Sig Dispense Refill    albuterol (PROAIR HFA) 90 mcg/actuation inhaler 2 puffs qid prn 54 g 3    amLODIPine (NORVASC) 5 MG tablet Take 1 tablet (5 mg total) by mouth every evening. 90 tablet 1    amoxicillin-clavulanate 875-125mg (AUGMENTIN) 875-125 mg per tablet Take 1 tablet by mouth 2 (two) times daily. 20 tablet 0    anastrozole (ARIMIDEX) 1 mg Tab Take 1 tablet (1 mg total) by mouth once daily. 90 tablet 3    ascorbic acid, vitamin C, (VITAMIN C) 500 MG tablet Take 500 mg by mouth once daily.      aspirin (ECOTRIN) 81 MG EC tablet Take 81 mg by mouth once daily.      atorvastatin (LIPITOR) 80 MG tablet Take 1 tablet (80 mg total) by mouth once daily. 90 tablet 3    benztropine (COGENTIN) 0.5 MG tablet Take 1 tablet (0.5 mg total) by mouth 2 (two) times daily. 180 tablet 3     cloNIDine (CATAPRES) 0.1 MG tablet 1 tab po q day for systolic BP > 160 or DBP >100. 30 tablet 1    cyanocobalamin 500 MCG tablet Take 500 mcg by mouth once daily.      diclofenac sodium (VOLTAREN) 1 % Gel Apply 2 g topically once daily. 200 g 0    ferrous sulfate 325 (65 FE) MG EC tablet Take 1 tablet (325 mg total) by mouth once daily. 1 tablet 0    folic acid (FOLVITE) 1 MG tablet Take 1 tablet (1,000 mcg total) by mouth once daily. 90 tablet 3    gabapentin (NEURONTIN) 100 MG capsule Take 1 capsule (100 mg total) by mouth 2 (two) times daily as needed (anxiety). 180 capsule 3    gabapentin (NEURONTIN) 300 MG capsule Take 1 capsule (300 mg total) by mouth every evening. 90 capsule 3    isosorbide mononitrate (IMDUR) 30 MG 24 hr tablet TAKE 1 TABLET(30 MG) BY MOUTH EVERY DAY 30 tablet 11    lisinopriL 10 MG tablet Take 1 tablet (10 mg total) by mouth once daily. 90 tablet 3    LUTEIN ORAL Take by mouth.      methotrexate 2.5 MG Tab Take 6 tablets (15 mg total) by mouth every 7 days. Take 3 tabs Mon am and 3 tabs Mon pm 24 tablet 2    multivitamin (THERAGRAN) per tablet Take 1 tablet by mouth once daily.      omega-3 fatty acids/fish oil (FISH OIL-OMEGA-3 FATTY ACIDS) 300-1,000 mg capsule Take by mouth once daily.      omeprazole (PRILOSEC) 20 MG capsule Take 1 capsule (20 mg total) by mouth once daily. 30 capsule 2    risperiDONE (RISPERDAL) 2 MG tablet TAKE 1 TABLET(2 MG) BY MOUTH EVERY MORNING 90 tablet 3    risperiDONE (RISPERDAL) 4 MG tablet Take 1 tablet (4 mg total) by mouth every evening. 90 tablet 3    tofacitinib citrate (XELJANZ ORAL) Take by mouth.      traMADoL (ULTRAM) 50 mg tablet TAKE 1 TABLET BY MOUTH EVERY 12 HOURS AS NEEDED FOR PAIN 30 tablet 0    venlafaxine (EFFEXOR-XR) 75 MG 24 hr capsule Take 1 capsule (75 mg total) by mouth once daily. 90 capsule 3    VITAMIN A ORAL Take by mouth.      vitamin D (VITAMIN D3) 1000 units Tab Take 1,000 Units by mouth once daily.      vitamin E 200 UNIT capsule  Take 200 Units by mouth once daily.      zinc gluconate 50 mg tablet Take 50 mg by mouth once daily.       No current facility-administered medications for this visit.     Facility-Administered Medications Ordered in Other Visits   Medication Dose Route Frequency Provider Last Rate Last Admin    tropicamide 1% ophthalmic solution 1 drop  1 drop Right Eye On Call Procedure Karen Song MD   1 drop at 08/01/19 0648       Review of patient's allergies indicates:   Allergen Reactions    Etanercept Other (See Comments)     Other reaction(s): recurrent infections    Chloramphenicol sod succinate Hives    Codeine Other (See Comments)     Other reaction(s): Stomach upset. Pt states OK with Percocet    Nickel sutures [surgical stainless steel] Dermatitis     Allergic contact dermatitis    Adhesive Rash       Objective:      Vitals:    03/14/23 0848   BP: (!) 160/80   Pulse: 74   Temp: 98 °F (36.7 °C)       Physical Exam  Constitutional:       General: She is not in acute distress.     Appearance: She is well-developed. She is not diaphoretic.   HENT:      Head: Normocephalic and atraumatic.      Nose: Mucosal edema present.      Right Sinus: Maxillary sinus tenderness present.      Left Sinus: Maxillary sinus tenderness present.   Eyes:      Pupils: Pupils are equal, round, and reactive to light.   Cardiovascular:      Rate and Rhythm: Normal rate and regular rhythm.      Heart sounds: Normal heart sounds. No murmur heard.    No friction rub. No gallop.   Pulmonary:      Effort: Pulmonary effort is normal. No respiratory distress.      Breath sounds: Normal breath sounds. No wheezing or rales.   Musculoskeletal:      Cervical back: Normal range of motion and neck supple.   Skin:     General: Skin is warm and dry.   Neurological:      Mental Status: She is alert and oriented to person, place, and time.       Assessment:       Problem List Items Addressed This Visit       Essential hypertension (Chronic)     Other Visit  Diagnoses       Acute bacterial sinusitis    -  Primary    Relevant Medications    amoxicillin-clavulanate 875-125mg (AUGMENTIN) 875-125 mg per tablet    Gastroesophageal reflux disease, unspecified whether esophagitis present        Relevant Medications    omeprazole (PRILOSEC) 20 MG capsule              Plan:       Bhavna was seen today for sinusitis.    Diagnoses and all orders for this visit:    Acute bacterial sinusitis  -     amoxicillin-clavulanate 875-125mg (AUGMENTIN) 875-125 mg per tablet; Take 1 tablet by mouth 2 (two) times daily.    Gastroesophageal reflux disease, unspecified whether esophagitis present  -     omeprazole (PRILOSEC) 20 MG capsule; Take 1 capsule (20 mg total) by mouth once daily.    Essential hypertension      Acute bacterial sinusitis  -duration >2 weeks  -augmentin x 10 days  -Advised saline irrigation, warm humidifier, steam inhalation, vicks vaporub, claritin D for congestion    GERD  -intolerant to protonix - try prilosec    HTN  -elevated  -reports will follow PCP and will take an additional lisinopril    Spent adequate time in obtaining history and explaining differentials    25 minutes spent during this visit of which greater than 50% devoted to face-face counseling and coordination of care regarding diagnosis and management plan    Rtc prn worsening

## 2023-03-15 ENCOUNTER — TELEPHONE (OUTPATIENT)
Dept: OPHTHALMOLOGY | Facility: CLINIC | Age: 68
End: 2023-03-15
Payer: MEDICARE

## 2023-03-15 NOTE — TELEPHONE ENCOUNTER
----- Message from Kelsea Fisher sent at 3/15/2023 11:10 AM CDT -----  Consult/Advisory    Name Of Caller:Bhavna      Contact Preference:[phone]    Nature of call: Pt called regarding her left eye she said she is having blurry vision

## 2023-03-21 ENCOUNTER — CLINICAL SUPPORT (OUTPATIENT)
Dept: REHABILITATION | Facility: HOSPITAL | Age: 68
End: 2023-03-21
Payer: MEDICARE

## 2023-03-21 DIAGNOSIS — M53.86 DECREASED RANGE OF MOTION OF LUMBAR SPINE: ICD-10-CM

## 2023-03-21 DIAGNOSIS — M47.816 SPONDYLOSIS OF LUMBAR REGION WITHOUT MYELOPATHY OR RADICULOPATHY: ICD-10-CM

## 2023-03-21 DIAGNOSIS — R26.89 DECREASED FUNCTIONAL MOBILITY: ICD-10-CM

## 2023-03-21 DIAGNOSIS — Z74.09 IMPAIRED FUNCTIONAL MOBILITY, BALANCE, GAIT, AND ENDURANCE: ICD-10-CM

## 2023-03-21 DIAGNOSIS — M53.3 SI (SACROILIAC) JOINT DYSFUNCTION: Primary | ICD-10-CM

## 2023-03-21 PROCEDURE — 97110 THERAPEUTIC EXERCISES: CPT | Mod: PN,CQ

## 2023-03-22 ENCOUNTER — OFFICE VISIT (OUTPATIENT)
Dept: HEMATOLOGY/ONCOLOGY | Facility: CLINIC | Age: 68
End: 2023-03-22
Payer: MEDICARE

## 2023-03-22 ENCOUNTER — HOSPITAL ENCOUNTER (OUTPATIENT)
Dept: RADIOLOGY | Facility: HOSPITAL | Age: 68
Discharge: HOME | End: 2023-03-22
Attending: SURGERY
Payer: MEDICARE

## 2023-03-22 ENCOUNTER — TELEPHONE (OUTPATIENT)
Dept: RHEUMATOLOGY | Facility: CLINIC | Age: 68
End: 2023-03-22
Payer: MEDICARE

## 2023-03-22 VITALS
HEART RATE: 76 BPM | HEIGHT: 63 IN | DIASTOLIC BLOOD PRESSURE: 66 MMHG | SYSTOLIC BLOOD PRESSURE: 144 MMHG | WEIGHT: 99.19 LBS | BODY MASS INDEX: 17.57 KG/M2 | OXYGEN SATURATION: 97 % | TEMPERATURE: 97 F | RESPIRATION RATE: 19 BRPM

## 2023-03-22 DIAGNOSIS — T45.1X5A AROMATASE INHIBITOR-ASSOCIATED ARTHRALGIA: ICD-10-CM

## 2023-03-22 DIAGNOSIS — M25.50 AROMATASE INHIBITOR-ASSOCIATED ARTHRALGIA: ICD-10-CM

## 2023-03-22 DIAGNOSIS — Z79.811 USE OF ANASTROZOLE: ICD-10-CM

## 2023-03-22 DIAGNOSIS — R63.4 WEIGHT LOSS: ICD-10-CM

## 2023-03-22 DIAGNOSIS — M85.89 OSTEOPENIA OF MULTIPLE SITES: ICD-10-CM

## 2023-03-22 DIAGNOSIS — Z12.31 SCREENING MAMMOGRAM FOR HIGH-RISK PATIENT: ICD-10-CM

## 2023-03-22 DIAGNOSIS — E83.52 HYPERCALCEMIA: Primary | ICD-10-CM

## 2023-03-22 DIAGNOSIS — Z17.0 CARCINOMA OF UPPER-OUTER QUADRANT OF RIGHT BREAST IN FEMALE, ESTROGEN RECEPTOR POSITIVE: Primary | ICD-10-CM

## 2023-03-22 DIAGNOSIS — C50.411 CARCINOMA OF UPPER-OUTER QUADRANT OF RIGHT BREAST IN FEMALE, ESTROGEN RECEPTOR POSITIVE: Primary | ICD-10-CM

## 2023-03-22 PROCEDURE — 77067 SCR MAMMO BI INCL CAD: CPT | Mod: 26,52,, | Performed by: RADIOLOGY

## 2023-03-22 PROCEDURE — 77063 BREAST TOMOSYNTHESIS BI: CPT | Mod: 26,52,, | Performed by: RADIOLOGY

## 2023-03-22 PROCEDURE — 3008F BODY MASS INDEX DOCD: CPT | Mod: CPTII,S$GLB,, | Performed by: INTERNAL MEDICINE

## 2023-03-22 PROCEDURE — 4010F ACE/ARB THERAPY RXD/TAKEN: CPT | Mod: CPTII,S$GLB,, | Performed by: INTERNAL MEDICINE

## 2023-03-22 PROCEDURE — 3077F PR MOST RECENT SYSTOLIC BLOOD PRESSURE >= 140 MM HG: ICD-10-PCS | Mod: CPTII,S$GLB,, | Performed by: INTERNAL MEDICINE

## 2023-03-22 PROCEDURE — 3078F PR MOST RECENT DIASTOLIC BLOOD PRESSURE < 80 MM HG: ICD-10-PCS | Mod: CPTII,S$GLB,, | Performed by: INTERNAL MEDICINE

## 2023-03-22 PROCEDURE — 77067 SCR MAMMO BI INCL CAD: CPT | Mod: TC,52

## 2023-03-22 PROCEDURE — 4010F PR ACE/ARB THEARPY RXD/TAKEN: ICD-10-PCS | Mod: CPTII,S$GLB,, | Performed by: INTERNAL MEDICINE

## 2023-03-22 PROCEDURE — 3008F PR BODY MASS INDEX (BMI) DOCUMENTED: ICD-10-PCS | Mod: CPTII,S$GLB,, | Performed by: INTERNAL MEDICINE

## 2023-03-22 PROCEDURE — 77063 MAMMO DIGITAL SCREENING LEFT WITH TOMO: ICD-10-PCS | Mod: 26,52,, | Performed by: RADIOLOGY

## 2023-03-22 PROCEDURE — 1125F AMNT PAIN NOTED PAIN PRSNT: CPT | Mod: CPTII,S$GLB,, | Performed by: INTERNAL MEDICINE

## 2023-03-22 PROCEDURE — 99999 PR PBB SHADOW E&M-EST. PATIENT-LVL V: ICD-10-PCS | Mod: PBBFAC,,, | Performed by: INTERNAL MEDICINE

## 2023-03-22 PROCEDURE — 99999 PR PBB SHADOW E&M-EST. PATIENT-LVL V: CPT | Mod: PBBFAC,,, | Performed by: INTERNAL MEDICINE

## 2023-03-22 PROCEDURE — 3288F FALL RISK ASSESSMENT DOCD: CPT | Mod: CPTII,S$GLB,, | Performed by: INTERNAL MEDICINE

## 2023-03-22 PROCEDURE — 3078F DIAST BP <80 MM HG: CPT | Mod: CPTII,S$GLB,, | Performed by: INTERNAL MEDICINE

## 2023-03-22 PROCEDURE — 3288F PR FALLS RISK ASSESSMENT DOCUMENTED: ICD-10-PCS | Mod: CPTII,S$GLB,, | Performed by: INTERNAL MEDICINE

## 2023-03-22 PROCEDURE — 99215 OFFICE O/P EST HI 40 MIN: CPT | Mod: S$GLB,,, | Performed by: INTERNAL MEDICINE

## 2023-03-22 PROCEDURE — 3077F SYST BP >= 140 MM HG: CPT | Mod: CPTII,S$GLB,, | Performed by: INTERNAL MEDICINE

## 2023-03-22 PROCEDURE — 1125F PR PAIN SEVERITY QUANTIFIED, PAIN PRESENT: ICD-10-PCS | Mod: CPTII,S$GLB,, | Performed by: INTERNAL MEDICINE

## 2023-03-22 PROCEDURE — 99215 PR OFFICE/OUTPT VISIT, EST, LEVL V, 40-54 MIN: ICD-10-PCS | Mod: S$GLB,,, | Performed by: INTERNAL MEDICINE

## 2023-03-22 PROCEDURE — 1101F PT FALLS ASSESS-DOCD LE1/YR: CPT | Mod: CPTII,S$GLB,, | Performed by: INTERNAL MEDICINE

## 2023-03-22 PROCEDURE — 1101F PR PT FALLS ASSESS DOC 0-1 FALLS W/OUT INJ PAST YR: ICD-10-PCS | Mod: CPTII,S$GLB,, | Performed by: INTERNAL MEDICINE

## 2023-03-22 PROCEDURE — 77067 MAMMO DIGITAL SCREENING LEFT WITH TOMO: ICD-10-PCS | Mod: 26,52,, | Performed by: RADIOLOGY

## 2023-03-22 RX ORDER — EXEMESTANE 25 MG/1
25 TABLET ORAL DAILY
Qty: 30 TABLET | Refills: 3 | Status: SHIPPED | OUTPATIENT
Start: 2023-03-22 | End: 2023-07-12

## 2023-03-22 RX ORDER — TRAMADOL HYDROCHLORIDE 50 MG/1
50 TABLET ORAL EVERY 6 HOURS PRN
Qty: 30 EACH | Refills: 0 | Status: SHIPPED | OUTPATIENT
Start: 2023-03-22 | End: 2023-08-10 | Stop reason: SDUPTHER

## 2023-03-22 NOTE — TELEPHONE ENCOUNTER
Please schedule labs ordered today in 2 wks. Please ask pt to stop any calcium tabs including Tums, Caltrate or Citracal  she might be taking  Thank you YEN

## 2023-03-22 NOTE — PROGRESS NOTES
CONSULT NOTE    Subjective:       Patient ID: Bhavna Landry is a 67 y.o. female.  MRN: [id number]  : 1955    Chief Complaint: 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 and Back Pain (Sciatic nerve)    pT1b N0 (i+)  grade 2 ER + NV - HER2 - IDC of the right breast.    History of Present Illness:   Bhavna Landry is a 67 y.o. female who is referred for right breast IDC.     She presented for screening mammogram on 2022 . This identified an asymmetry in the right breast. Follow-up mammogram and ultrasound on 2022 showed a 1 x 0.9 x 0.8 cm mass at the 9 o'clock position of the right breast 3 cm from the nipple. A ultrasound guided biopsy was performed on 2022 with pathology revealing invasive ductal carcinoma of the breast.     She saw Dr. Dhillon and underwent right mastectomy and sentinel node exam.  It showed IDC 8 mm, grade 2, ER strongly positive, NV negative, HER2/jayla 1+, Ki-67 12%.  There was also DCIS, multiple foci up to 8 mm, spanning a distance of 20 mm.  Nuclear grade 2. Two sentinel nodes removed, 1 had isolated tumor cells.    Oncotype DX RS 28; Chemo benefit >15%     She has declined adjuvant chemotherapy.     Started Anastrozole  22    Interim history:   She presents today for regular follow-up. Since last visit saw orthopedic who recommended back specialists for lumbar spinal pain, but does not want surgical intervention. Controls pain with apap, which provides moderate relief. Tolerating anastrazole well, taking daily. No hot flashes but does have worsening arthralgias.     She has back pain and recently diagnosed with sciatica. She is undergoing PT, planning to undergo nerve block 5/3.   Ran out of Tramadol.     Has lost about 8-10 lbs since her last visit, unclear etiology. Has had recent sinusitis, was treated with antibiotics for 10 days.  Has a history of Mucor so treatedment was changed to oral pcn. Still  "has cough and post nasal drip.     She was recently diagnosed with TONIE and B12 def. Started on oral iron. Had a colonoscopy in Dec,(sessile polyps)  had EGD in February that showed gastritis.       Oncology History:  1. Breast, right, mastectomy:   - Invasive ductal carcinoma       - Greatest dimension:  8 mm       - Jonathan-Alaniz modified SBR score 3 + 2 + 1 = 6 of 9       - Histologic grade 2 of 3       - Mitotic index = 0.2 (average mitoses per high power field) (low)       - Resection margin free of invasive carcinoma           - Invasive carcinoma present 9 mm to closest posterior margin, 26 mm   to inferior margin, and 45 mm to superior margin   - Biopsy clip present   - No lymphovascular invasion is identified   - Ductal carcinoma in situ       - Foci up to 8 mm in greatest dimension spanning a distance of   approximately 20 mm       - Nuclear grade ranges from 2 to 3 of 3       - Solid and cribriform patterns       - Central comedonecrosis present:  Approximately 80%       - Resection margin free of DCIS           - DCIS present present 9 mm to closest posterior margin, 26 mm to   inferior margin, and 45 mm to superior margin   - Background breast contains usual ductal hyperplasia, apocrine metaplasia,   microcysts and macrocysts, ectatic ducts, and stromal fibrosis   - Microcalcifications, calcium phosphate type, associated with DCIS and   focally with medial calcific arterial stenosis (Monckeberg's sclerosis)   - See CAP Tumor Synoptic   2. Mcnary lymph node, right axillary, "#1 hot and blue count 2004,"   excision:   - 1 lymph node, isolated tumor cells present   3. Mcnary lymph node, right axillary, "#2 hot and blue count 1700,"   excision:   - 1 lymph node, negative for metastatic carcinoma   CAP Tumor Synoptic:   Procedure:  Total mastectomy   Specimen laterality:  Right   Tumor site:  9 o'clock   Histologic type:  Invasive carcinoma of no special type (ductal)   Histologic grade (Shelly " histologic score):  Shelly score 3 + 2 + 1 =   6 of 9   Glandular (acinar) / tubular differentiation:  Score 3   Nuclear pleomorphism:  Score 2   Mitotic grade:  Score 1   Overall grade:  Grade 2   Tumor size:  8 mm   Tumor focality:  Single focus of invasive carcinoma   Ductal carcinoma in situ (DCIS):  Present (Negative for extensive intraductal   component)       Size (extent) of DCIS:  Foci up to 8 mm in greatest dimension spanning a   distance of approximately 20 mm       Number blocks with DCIS:  4       Number blocks examined:  13       Architectural patterns:  Comedo, cribriform, solid       Nuclear grade:  Ranges from grade 2 (intermediate) to grade 3 (high)   Lobular carcinoma in situ (LCIS):  Not identified   Tumor extent:  Not applicable   Lymphovascular invasion:  Not identified   Dermal lymphovascular invasion:  Not identified   Microcalcifications:  Present in DCIS and nonneoplastic tissue   Treatment effect in the breast:  No known pre-surgical therapy   Treatment effect in the lymph nodes:  Not applicable   Margin status for invasive carcinoma:  All margins negative for invasive   carcinoma       Distance from invasive carcinoma closest posterior margin:  9 mm       Distance from invasive carcinoma to inferior margin:  26 mm       Distance from invasive carcinoma to superior margin:  45 mm   Margin status for DCIS:  All margins negative for DCIS       Distant from DCIS to closest posterior margin:  9 mm       Distance from DCIS to inferior margin:  26 mm       Distance from DCIS to superior margin:  45 mm   Regional lymph node status:  Tumor present in regional lymph nodes   Number of lymph nodes with macrometastases:  0   Number of lymph nodes with micrometastases:  0   Number of lymph nodes with isolated tumor cells:  1   Size of largest jose l metastatic deposit:  0.13 mm   Extranodal extension:  Not identified   Total number of lymph nodes examined (sentinel and nonsentinel):  2   Number of  sentinel nodes examined:  2   Distant sites involved:  Not applicable   TNM descriptors:  Not applicable   pT category:  pT1b   Regional lymph nodes modifier:  (sn)   pN category:  (sn) pN0 (i+)   pM category:  Not applicable - pM cannot be determined from the submitted   specimens   Special studies:  Biomarkers, assessed by immunohistochemistry on prior right   breast biopsy ([facility] with following reported results:       - Estrogen Receptor (ER):  Positive (100%; strong intensity)       - Progesterone Receptor (VA):  Negative (<1%)       - HER2:  Negative (1+)       - Ki-67 proliferation index:  12% (low)       Oncology History   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, estrogen receptor positive   4/13/2022 Initial Diagnosis    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, estrogen receptor positive     4/13/2022 Cancer Staged    Staging form: Breast, AJCC 8th Edition  - Clinical stage from 4/13/2022: Stage IA (cT1b, cN0, cM0, G2, ER+, VA-, HER2-)       7/19/2022 - 7/19/2022 Chemotherapy    Treatment Summary   Plan Name: OP BREAST TC - DOCETAXEL CYCLOPHOSPHAMIDE Q3W  Treatment Goal: Curative  Status: Inactive  Start Date:   End Date:   Provider: Idania Martins MD  Chemotherapy: cyclophosphamide 600 mg/m2 = 880 mg in sodium chloride 0.9% 250 mL chemo infusion, 600 mg/m2, Intravenous, Clinic/HOD 1 time, 0 of 4 cycles  DOCEtaxeL (TAXOTERE) 75 mg/m2 = 110 mg in sodium chloride 0.9% 250 mL chemo infusion, 75 mg/m2, Intravenous, Clinic/HOD 1 time, 0 of 4 cycles           History:  Past Medical History:   Diagnosis Date    Allergy     Amblyopia     Anemia     Anticoagulant long-term use     Arthritis 02/02/1992    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, estrogen receptor positive 4/13/2022    Cataract     Depression     Dry eyes     Dry mouth     Duane's syndrome of right eye     Early dry stage nonexudative age-related macular degeneration of both eyes  2022    Fibromyalgia 2014    Fibromyalgia     Fractured hip     RIGHT HIP    GERD (gastroesophageal reflux disease)     History of psychiatric hospitalization     Hyperlipidemia 1992    Hypertension     Hypocalcemia     Hyponatremia     Kidney stone     Migraine headache     Osteoporosis     Psoriatic arthritis 1992    Right knee pain     post knee replacement surgery (possible rejectiion of metal)    RLS (restless legs syndrome)     Schizophrenia 1992    stable on meds    Squamous cell carcinoma of skin     Urinary tract infection       Past Surgical History:   Procedure Laterality Date    brow ptosis repair Right 2019    Surgeon: Dr. Karla Henson    CATARACT EXTRACTION       SECTION      COLONOSCOPY N/A 2016    Procedure: COLONOSCOPY;  Surgeon: ANDRIA Connelly MD;  Location: [address]);  Service: Endoscopy;  Laterality: N/A;    COLONOSCOPY N/A 2022    Procedure: COLONOSCOPY;  Surgeon: Mikey Walters MD;  Location: [facility] ENDO;  Service: Endoscopy;  Laterality: N/A;    cyst removed from right sinus  1982    ESOPHAGOGASTRODUODENOSCOPY N/A 2023    Procedure: EGD (ESOPHAGOGASTRODUODENOSCOPY);  Surgeon: Dougie Shea MD;  Location: [facility];  Service: Endoscopy;  Laterality: N/A;    HYSTERECTOMY      VAGINAL HYSTERECTOMY WITHOUT BSO - ENDOMETRIOSIS    INJECTION FOR SENTINEL NODE IDENTIFICATION Right 2022    Procedure: INJECTION, FOR SENTINEL NODE IDENTIFICATION-Right;  Surgeon: NEAL Dhillon MD;  Location: [facility] OR;  Service: General;  Laterality: Right;    INJECTION OF ANESTHETIC AGENT AROUND MEDIAL BRANCH NERVES INNERVATING LUMBAR FACET JOINT N/A 2019    Procedure: Lumbo-sacral Block, DR5 and Lateral Branches of S1,S2, S3;  Surgeon: Michelle Pineda Jr., MD;  Location: [facility] PAIN MGT;  Service: Pain Management;  Laterality: N/A;  Pt takes  and states she holds ASA on her own whenever she has procedures.  Instructed to hold x 3  days prior to procedure.      INJECTION OF ANESTHETIC AGENT INTO SACROILIAC JOINT Right 8/30/2018    Procedure: BLOCK, SACROILIAC JOINT-Right- ORAL SEDATION;  Surgeon: Michelle Pineda Jr., MD;  Location: [facility] PAIN MG;  Service: Pain Management;  Laterality: Right;    INJECTION OF ANESTHETIC AGENT INTO SACROILIAC JOINT Bilateral 9/27/2018    Procedure: BLOCK, SACROILIAC JOINT-BILATERAL;  Surgeon: Michelle Pineda Jr., MD;  Location: [facility] PAIN MGT;  Service: Pain Management;  Laterality: Bilateral;  Please keep at 10:00 due to trasnsportation    INJECTION OF ANESTHETIC AGENT INTO SACROILIAC JOINT Bilateral 2/7/2019    Procedure: Bilateral Sacroiliac Joint Injection - Per Dr Pineda, not necessary to hold ASA.;  Surgeon: Michelle Pineda Jr., MD;  Location: [facility] PAIN [facility];  Service: Pain Management;  Laterality: Bilateral;    INTRAOCULAR PROSTHESES INSERTION Right 8/1/2019    Procedure: INSERTION, IOL PROSTHESIS;  Surgeon: Karen Song MD;  Location: [address];  Service: Ophthalmology;  Laterality: Right;    INTRAOCULAR PROSTHESES INSERTION Left 9/26/2019    Procedure: INSERTION, IOL PROSTHESIS;  Surgeon: Karen Song MD;  Location: [address];  Service: Ophthalmology;  Laterality: Left;    JOINT REPLACEMENT Right     knee    KNEE SURGERY      MASTECTOMY Right 5/9/2022    Procedure: MASTECTOMY-Right;  Surgeon: NEAL Dhillon MD;  Location: [facility];  Service: General;  Laterality: Right;  2.5 HOURS    PHACOEMULSIFICATION OF CATARACT Right 8/1/2019    Procedure: PHACOEMULSIFICATION, CATARACT;  Surgeon: Karen Song MD;  Location: [address];  Service: Ophthalmology;  Laterality: Right;    PHACOEMULSIFICATION OF CATARACT Left 9/26/2019    Procedure: PHACOEMULSIFICATION, CATARACT;  Surgeon: Karen Song MD;  Location: [facility] OR [address] [place] [address];  Service: Ophthalmology;  Laterality: Left;    RADIOFREQUENCY THERMOCOAGULATION Right 5/7/2019    Procedure: RADIOFREQUENCY THERMAL COAGULATION  RIGHT DORSAL RAMUS 5 AND LATERAL BRANCH OF S1, S2 AND S3;  Surgeon: Michelle Pineda Jr., MD;  Location: [facility];  Service: Pain Management;  Laterality: Right;    RADIOFREQUENCY THERMOCOAGULATION Left 5/14/2019    Procedure: RADIOFREQUENCY THERMAL COAGULATION LEFT DORSAL RAMUS 5 AND LATERAL BRANCH OF S1,S2 AND S3;  Surgeon: Michelle Pineda Jr., MD;  Location: [facility];  Service: Pain Management;  Laterality: Left;    RADIOFREQUENCY THERMOCOAGULATION Right 10/22/2019    Procedure: RADIOFREQUENCY THERMAL COAGULATION---DARSAL RAMUS 5 and LATERAL S1,S2,and S3 Right;  Surgeon: Michelle Pineda Jr., MD;  Location: [facility];  Service: Pain Management;  Laterality: Right;  patient to sign consent DOS    RADIOFREQUENCY THERMOCOAGULATION Left 10/29/2019    Procedure: RADIOFREQUENCY THERMAL COAGULATION - LEFT - DR5, S1,S2, AND S3;  Surgeon: Michelle Pineda Jr., MD;  Location: [facility];  Service: Pain Management;  Laterality: Left;    RADIOFREQUENCY THERMOCOAGULATION Left 5/26/2020    Procedure: RADIOFREQUENCY THERMAL COAGULATION--Left DR5+ lateral branches of S1, S2, S3;  Surgeon: Michelle Pineda Jr., MD;  Location: [facility];  Service: Pain Management;  Laterality: Left;    RADIOFREQUENCY THERMOCOAGULATION Right 6/2/2020    Procedure: RADIOFREQUENCY THERMAL COAGULATION--Right DR5+ lateral branches of S1, S2, S3;  Surgeon: Michelle Pineda Jr., MD;  Location: [facility];  Service: Pain Management;  Laterality: Right;    SENTINEL LYMPH NODE BIOPSY Right 5/9/2022    Procedure: BIOPSY, LYMPH NODE, SENTINEL-Right;  Surgeon: NEAL Dhillon MD;  Location: [facility];  Service: General;  Laterality: Right;    Surgery on right knee  07/09/1982    tumor removed from back left side upper shoulder  03/17/2006     Family History   Problem Relation Age of Onset    Colon cancer Mother     Psoriasis Mother     Cancer Mother         colon    Depression Mother     Cancer Father         lymphoma    Stroke  Sister     Diabetes Brother     Arthritis Brother     Heart disease Brother         cad    No Known Problems Daughter     Hypertension Neg Hx     Suicide Neg Hx     Amblyopia Neg Hx     Blindness Neg Hx     Cataracts Neg Hx     Glaucoma Neg Hx     Macular degeneration Neg Hx     Retinal detachment Neg Hx     Strabismus Neg Hx       Social History     Tobacco Use    Smoking status: Former     Packs/day: 0.25     Years: 10.00     Pack years: 2.50     Types: Cigarettes     Quit date: 1/10/2023     Years since quittin.1     Passive exposure: Never    Smokeless tobacco: Former    Tobacco comments:     about 5 cig a day   Substance and Sexual Activity    Alcohol use: No    Drug use: No    Sexual activity: Not Currently     Partners: Male     Birth control/protection: Post-menopausal        ROS:   Review of Systems   Constitutional:  Negative for fever, malaise/fatigue and weight loss.   HENT:  Negative for congestion, ear pain, nosebleeds and sore throat.    Eyes:  Negative for blurred vision, double vision and photophobia.   Respiratory:  Negative for cough, hemoptysis, sputum production, shortness of breath, wheezing and stridor.    Cardiovascular:  Negative for chest pain, palpitations, orthopnea and leg swelling.   Gastrointestinal:  Negative for abdominal pain, blood in stool, constipation, diarrhea, heartburn, melena, nausea and vomiting.   Genitourinary:  Negative for dysuria and hematuria.   Musculoskeletal:  Positive for back pain and joint pain (left hip with recent exacerbation). Negative for myalgias and neck pain.   Skin:  Negative for itching and rash.   Neurological:  Negative for dizziness, focal weakness, seizures, weakness and headaches.   Endo/Heme/Allergies:  Negative for polydipsia. Does not bruise/bleed easily.   Psychiatric/Behavioral:  Positive for depression. Negative for memory loss. The patient is nervous/anxious. The patient does not have insomnia.       Objective:     Vitals:    23  "1439   BP: (!) 144/66   Pulse: 76   Resp: 19   Temp: 97.2 °F (36.2 °C)   TempSrc: Oral   SpO2: 97%   Weight: 45 kg (99 lb 3.3 oz)   Height: 5' 3" (1.6 m)   PainSc:   7           Physical Examination:   Physical Exam  Vitals and nursing note reviewed.   Constitutional:       General: She is not in acute distress.     Appearance: She is not diaphoretic.   HENT:      Head: Normocephalic.      Mouth/Throat:      Pharynx: No oropharyngeal exudate.   Eyes:      General: No scleral icterus.     Conjunctiva/sclera: Conjunctivae normal.   Neck:      Thyroid: No thyromegaly.   Cardiovascular:      Rate and Rhythm: Normal rate and regular rhythm.      Heart sounds: Normal heart sounds. No murmur heard.  Pulmonary:      Effort: Pulmonary effort is normal. No respiratory distress.      Breath sounds: No stridor. No wheezing or rhonchi.   Abdominal:      General: Bowel sounds are normal. There is no distension.      Palpations: Abdomen is soft. There is no mass.      Tenderness: There is no abdominal tenderness. There is no rebound.   Musculoskeletal:         General: Tenderness (left hip) present. No deformity. Normal range of motion.      Cervical back: Neck supple.   Lymphadenopathy:      Cervical: No cervical adenopathy.   Skin:     General: Skin is warm and dry.      Findings: No erythema or rash.   Neurological:      Mental Status: She is alert and oriented to person, place, and time.      Cranial Nerves: No cranial nerve deficit.      Coordination: Coordination normal.      Gait: Gait is intact.   Psychiatric:         Mood and Affect: Affect normal.         Cognition and Memory: Memory normal.         Judgment: Judgment normal.        Diagnostic Tests:  Significant Imaging: I have reviewed and interpreted all pertinent imaging results/findings.    Laboratory Data:  All pertinent labs have been reviewed.    Labs:   Lab Results   Component Value Date    WBC 7.48 02/14/2023    HGB 12.6 02/14/2023    HCT 40.4 02/14/2023    MCV " 92 02/14/2023     02/14/2023       Assessment/Plan:   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, estrogen receptor positive  pT1b N0 (i+)  grade 2 ER + CA - HER2 - IDC of the right breast.    She is status post mastectomy and sentinel node exam for IDC as well as DCIS of the right breast.     Oncotype returned high risk, which confers a benefit ot the addition of chemotherapy to AI.   RSclin showed an 8% risk of recurrence with 4% absolute benefit of chemotherapy. She has declined chemotherapy.     Tolerating anastrozole relatively well. Continue at this time. See below.   Continue Zometa q 6 months for bone health and possible risk reduction for breast cancer, next due 5/2023.     Continue active surveillance. Will see her back in 6 weeks for her scheduled visit. Left mammogram done, follow results.     Osteopenia of multiple sites  Diagnosed with osteopenia/osteoporosis given hip fracture in 2020. Follows up with Dr. Cornelius and is on zoledronic acid, now receiving q 6 months.   Continues on MTX weekly  Follow up bone density scan is normal.     Hip pain   Recent exacerbation of left hip pain. Imaging revealed no abnormality of hip, has degenerative changes L3-L5. Declines surgical evaluation at this time. Continue PT, continue to monitor.     Aromatase inhibitor arthralgias   Continue supportive care. She declined acupuncture referral previously.     Colon Polyps  TONIE  Recent colonoscopy 12/09, path did not show any malignancy.   EGD completed 12/09  Continue oral iron supplementation per PMD, improving.     MDM includes  :    - Acute or chronic illness or injury that poses a threat to life or bodily function  - Independent review and explanation of 3+ results from unique tests  - Discussion of management and ordering 3+ unique tests  - Extensive discussion of treatment and management  - Prescription drug management  - Drug therapy requiring intensive monitoring for toxicity        ECOG  SCORE    1 - Restricted in strenuous activity-ambulatory and able to carry out work of a light nature           Discussion:   Patient discussed with attending physician, Dr. Chari Pinzon, PGY-VI  Hematology/Oncology Fellow      Med Onc Chart Routing      Follow up with physician . 5/9 with same day lab and infusion   Follow up with LATOYA    Infusion scheduling note 5/9 zometa infusion   Injection scheduling note    Labs CBC and CMP   Scheduling:  Preferred lab:  Lab interval:     Imaging    Pharmacy appointment    Other referrals

## 2023-03-23 ENCOUNTER — CLINICAL SUPPORT (OUTPATIENT)
Dept: REHABILITATION | Facility: HOSPITAL | Age: 68
End: 2023-03-23
Payer: MEDICARE

## 2023-03-23 ENCOUNTER — TELEPHONE (OUTPATIENT)
Dept: RHEUMATOLOGY | Facility: CLINIC | Age: 68
End: 2023-03-23
Payer: MEDICARE

## 2023-03-23 DIAGNOSIS — R26.89 DECREASED FUNCTIONAL MOBILITY: Primary | ICD-10-CM

## 2023-03-23 DIAGNOSIS — M53.86 DECREASED RANGE OF MOTION OF LUMBAR SPINE: ICD-10-CM

## 2023-03-23 DIAGNOSIS — Z74.09 IMPAIRED FUNCTIONAL MOBILITY, BALANCE, GAIT, AND ENDURANCE: ICD-10-CM

## 2023-03-23 PROCEDURE — 97110 THERAPEUTIC EXERCISES: CPT | Mod: PN

## 2023-03-23 PROCEDURE — 97140 MANUAL THERAPY 1/> REGIONS: CPT | Mod: PN

## 2023-03-23 NOTE — PROGRESS NOTES
[facility]   Physical Therapy Treatment Note     Name: Bhavna Landry  [facility] Number: [id number]    Therapy Diagnosis:   Encounter Diagnoses   Name Primary?    Decreased functional mobility Yes    Impaired functional mobility, balance, gait, and endurance     Decreased range of motion of lumbar spine          Physician: Allyson Galaviz, *    Visit Date: 3/23/2023    Encounter Diagnoses   Name Primary?    SI (sacroiliac) joint dysfunction      Spondylosis of lumbar region without myelopathy or radiculopathy      Decreased functional mobility      Impaired functional mobility, balance, gait, and endurance      Decreased range of motion of lumbar spine     Physician: Allyson Galaviz, *     Physician Orders: PT Eval and Treat   Medical Diagnosis from Referral:   M53.3 (ICD-10-CM) - SI (sacroiliac) joint dysfunction   M47.816 (ICD-10-CM) - Spondylosis of lumbar region without myelopathy or radiculopathy      Evaluation Date: 3/9/2023  Authorization Period Expiration: 4/6/2023  Plan of Care Expiration: 5/4/2023  Progress Note Due: 4/9/2023  Visit # / Visits authorized: 2/pending   FOTO: 2/5     Precautions: Standard, asthma, smoker, osteoporosis      PTA Visit #: 1/5     Time In: 7:08 AM  Time Out: 8:01 AM   Total Billable Time: 53 minutes    SUBJECTIVE     Pt reports: Pain improved for about 24 hours but returned around 3 pm yesterday. Left SIJ point > right.     She was compliant with home exercise program.  Response to previous treatment: some relief   Functional change: Ongoing    Pain: 5/10  Location: bilateral back      OBJECTIVE     Objective Measures updated at progress report unless specified.     Treatment     Jessica received the treatments listed below:      therapeutic exercises to develop strength, endurance, ROM, flexibility, posture, and core stabilization for 28 minutes 1:1 physical therapist including:     Prone hip extension: 2x10 each     Propped hook-lying on red  "cheese:  Gluteus squeezes: 15x5"  Bridges: 2x10 propped  Piriformis stretch: 3x30" bilateral  Scaitic nerve glides: 20x bilateral  Swiss ball roll outs: 20x   Transverse abdominus into blue peanut: 10x5"  Sink distraction stretch: 10x10"    OOT:  Transverse abdominus 15x5" holds   Hook lying Hip Add isometric 20x5" hold with sm.yellow ball  Hamstring stretch 3x30" with strap  Sit to stand from standard height chair 2x10 reps No upper extremity support     NEXT: hook-lying multifidi    manual therapy techniques: Joint mobilizations and Soft tissue Mobilization were applied for 25 minutes, including:  Piriformis flossing   PA hip mobilizations   Lumbar manual distraction with black belts (gentle, pain-free)      Patient Education and Home Exercises     Home Exercises Provided and Patient Education Provided     Education provided:   - home exercise program review  - add sink distraction stretch    Written Home Exercises Provided: Patient instructed to cont prior HEP. Exercises were reviewed and Jessica was able to demonstrate them prior to the end of the session.  Jessica demonstrated good  understanding of the education provided. See EMR under Patient Instructions for exercises provided during therapy sessions    ASSESSMENT   Bhavna is a 67 y.o. 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 Patient presents with signs and symptoms consistent with L4-L5 radiculopathy and sacroiliitis. Performed manual lumber distraction with significant relief of lumbosacral pressure following. Added more gluteus activation and strengthening with fatigue following. Will add more posterior chain strengthening next.      Jessica tolerated her first follow up well. She needed supervision to perform the therapeutic exercises effectively. Reports minimal relief following " today's interventions.   Jessica Is progressing well towards her goals.   Pt prognosis is Good.     Pt will continue to benefit from skilled outpatient physical therapy to address the deficits listed in the problem list box on initial evaluation, provide pt/family education and to maximize pt's level of independence in the home and community environment.     Pt's spiritual, cultural and educational needs considered and pt agreeable to plan of care and goals.     Anticipated barriers to physical therapy: co-morbidities    Goals:   Short Term Goals (4 Weeks):  1. Patient will be compliant with home exercise program to supplement therapy in promoting functional mobility.  2. Patient will perform transverse abdominus contraction with good control to demonstrate improved core strength.  3. Patient will report no pain during thoracolumbar active range of motion to promote functional mobility.  4. Patient will improve impaired lower extremity myotomes/manual muscle tests  to >/=4/5 to improve strength for functional tasks.  5. Patient will reports </= 3/10 pain at rest to improve quality of life.     Long Term Goals (8 Weeks):   1. Patient will improve FOTO score to </= 50% limited to decrease perceived limitation with maintaining/changing body position.   2. Patient will improve bilateral hip external/internal rotation range of motion to 30 degrees to promote functional mobility.  3. Patient will improve impaired lower extremity myotomes/manual muscle tests to >/=4+/5 to improve strength for functional tasks.  4. Patient will report no pain while walking 1 mile to return to daily walks.     PLAN     Continue PT plan of care     Stephanie Castellanos, PT

## 2023-03-23 NOTE — TELEPHONE ENCOUNTER
Spoke with patient advise her to stop any calcium tablets per Dr. Cornelius. Patient will get labs in two weeks

## 2023-03-27 ENCOUNTER — CLINICAL SUPPORT (OUTPATIENT)
Dept: REHABILITATION | Facility: HOSPITAL | Age: 68
End: 2023-03-27
Payer: MEDICARE

## 2023-03-27 DIAGNOSIS — M53.86 DECREASED RANGE OF MOTION OF LUMBAR SPINE: ICD-10-CM

## 2023-03-27 DIAGNOSIS — R26.89 DECREASED FUNCTIONAL MOBILITY: Primary | ICD-10-CM

## 2023-03-27 DIAGNOSIS — Z74.09 IMPAIRED FUNCTIONAL MOBILITY, BALANCE, GAIT, AND ENDURANCE: ICD-10-CM

## 2023-03-27 PROCEDURE — 97110 THERAPEUTIC EXERCISES: CPT | Mod: PN,CQ

## 2023-03-27 PROCEDURE — 97140 MANUAL THERAPY 1/> REGIONS: CPT | Mod: PN,CQ

## 2023-03-27 NOTE — PROGRESS NOTES
[facility]   Physical Therapy Treatment Note     Name: Bhavna Landry  [facility] Number: [id number]    Therapy Diagnosis:   Encounter Diagnoses   Name Primary?    Decreased functional mobility Yes    Impaired functional mobility, balance, gait, and endurance     Decreased range of motion of lumbar spine        Physician: Allyson Galaviz, *    Visit Date: 3/27/2023    Encounter Diagnoses   Name Primary?    SI (sacroiliac) joint dysfunction      Spondylosis of lumbar region without myelopathy or radiculopathy      Decreased functional mobility      Impaired functional mobility, balance, gait, and endurance      Decreased range of motion of lumbar spine     Physician: Allyson Galaviz, *     Physician Orders: PT Eval and Treat   Medical Diagnosis from Referral:   M53.3 (ICD-10-CM) - SI (sacroiliac) joint dysfunction   M47.816 (ICD-10-CM) - Spondylosis of lumbar region without myelopathy or radiculopathy      Evaluation Date: 3/9/2023  Authorization Period Expiration: 4/6/2023  Plan of Care Expiration: 5/4/2023  Progress Note Due: 4/9/2023  Visit # / Visits authorized: 3/11  FOTO: 2/5     Precautions: Standard, asthma, smoker, osteoporosis    PTA Visit #: 1/5     Time In: 2:00 PM  Time Out:  3:00 PM   Total Billable Time: 60 minutes    SUBJECTIVE     Pt reports: soreness post last visit but no other changes pre tx.      She was compliant with home exercise program.  Response to previous treatment: some relief   Functional change: Ongoing    Pain: 4-5/10  Location: bilateral back      OBJECTIVE     Objective Measures updated at progress report unless specified.     Treatment     Jessica received the treatments listed below:      therapeutic exercises to develop strength, endurance, ROM, flexibility, posture, and core stabilization for 40 minutes 1:1 physical therapist assistant including:     Prone hip extension: 2x10 each -> NT     Propped hook-lying on red cheese:  Gluteus squeezes:  "20x5"  Bridges: 2x10 propped  Piriformis stretch: 3x30" bilateral  Scaitic nerve glides: 20x bilateral  Transverse abdominus 20x5" holds   +hook-lying multifidi iso extension into table: 10x3"  + shotgun technique w/ ball/belt: 20x5" holds at 50% strength  Sit to stand from standard height chair 2x10 reps No upper extremity support     Seated:   Swiss ball roll outs: 20x   Hamstring stretch 3x30" with strap (seated today w/ stool)    OOT:  Transverse abdominus into blue peanut: 10x5"  Sink distraction stretch: 10x10"  Hook lying Hip Add isometric 20x5" hold with sm.yellow ball    manual therapy techniques: Joint mobilizations and Soft tissue Mobilization were applied for 20 minutes, including:  - Lumbar manual distraction with green peanut support BLE and black belts (gentle, pain-free)  + R LAD grade I  + MET to correct R sacral torsion     NT:  Piriformis flossing   PA hip mobilizations     Patient Education and Home Exercises     Home Exercises Provided and Patient Education Provided     Education provided:   - home exercise program review  - add sink distraction stretch    Written Home Exercises Provided: Patient instructed to cont prior HEP. Exercises were reviewed and Jessica was able to demonstrate them prior to the end of the session.  Jessica demonstrated good  understanding of the education provided. See EMR under Patient Instructions for exercises provided during therapy sessions    ASSESSMENT     Pt w/ good tricia of instructed exercises today as progressions were made in tx. Pt w/ no c/o increased pain throughout tx but pt required the use of her inhaler mid tx 2/2 coughing and difficulty breathing. Pt w/ good response to propping up on wedge during exercises.  Pt required min verbal cues throughout tx to complete exercises w/ proper technique. Pt w/ good response to all MT. Pt demonstrated increased TTP at bilat SIJ and PSIS. Pt demonstrated slight increased pain during lumbar traction but tolerated well w/ " modification. Pt was advised that she may be sore 2/2 new MT interventions.     Jessica tolerated her first follow up well. She needed supervision to perform the therapeutic exercises effectively. Reports minimal relief following today's interventions.   Jessica Is progressing well towards her goals.   Pt prognosis is Good.     Pt will continue to benefit from skilled outpatient physical therapy to address the deficits listed in the problem list box on initial evaluation, provide pt/family education and to maximize pt's level of independence in the home and community environment.     Pt's spiritual, cultural and educational needs considered and pt agreeable to plan of care and goals.     Anticipated barriers to physical therapy: co-morbidities    Goals:   Short Term Goals (4 Weeks):  1. Patient will be compliant with home exercise program to supplement therapy in promoting functional mobility.  2. Patient will perform transverse abdominus contraction with good control to demonstrate improved core strength.  3. Patient will report no pain during thoracolumbar active range of motion to promote functional mobility.  4. Patient will improve impaired lower extremity myotomes/manual muscle tests  to >/=4/5 to improve strength for functional tasks.  5. Patient will reports </= 3/10 pain at rest to improve quality of life.     Long Term Goals (8 Weeks):   1. Patient will improve FOTO score to </= 50% limited to decrease perceived limitation with maintaining/changing body position.   2. Patient will improve bilateral hip external/internal rotation range of motion to 30 degrees to promote functional mobility.  3. Patient will improve impaired lower extremity myotomes/manual muscle tests to >/=4+/5 to improve strength for functional tasks.  4. Patient will report no pain while walking 1 mile to return to daily walks.     PLAN     Continue PT plan of care     Adrianna Platt PTA

## 2023-03-28 ENCOUNTER — OFFICE VISIT (OUTPATIENT)
Dept: OTOLARYNGOLOGY | Facility: CLINIC | Age: 68
End: 2023-03-28
Payer: MEDICARE

## 2023-03-28 ENCOUNTER — PES CALL (OUTPATIENT)
Dept: ADMINISTRATIVE | Facility: CLINIC | Age: 68
End: 2023-03-28
Payer: MEDICARE

## 2023-03-28 VITALS
HEART RATE: 77 BPM | WEIGHT: 102.31 LBS | DIASTOLIC BLOOD PRESSURE: 64 MMHG | BODY MASS INDEX: 18.12 KG/M2 | SYSTOLIC BLOOD PRESSURE: 129 MMHG

## 2023-03-28 DIAGNOSIS — J32.0 CHRONIC MAXILLARY SINUSITIS: Primary | ICD-10-CM

## 2023-03-28 DIAGNOSIS — R05.3 CHRONIC COUGH: ICD-10-CM

## 2023-03-28 PROCEDURE — 1160F RVW MEDS BY RX/DR IN RCRD: CPT | Mod: CPTII,S$GLB,, | Performed by: OTOLARYNGOLOGY

## 2023-03-28 PROCEDURE — 3074F PR MOST RECENT SYSTOLIC BLOOD PRESSURE < 130 MM HG: ICD-10-PCS | Mod: CPTII,S$GLB,, | Performed by: OTOLARYNGOLOGY

## 2023-03-28 PROCEDURE — 1160F PR REVIEW ALL MEDS BY PRESCRIBER/CLIN PHARMACIST DOCUMENTED: ICD-10-PCS | Mod: CPTII,S$GLB,, | Performed by: OTOLARYNGOLOGY

## 2023-03-28 PROCEDURE — 3288F PR FALLS RISK ASSESSMENT DOCUMENTED: ICD-10-PCS | Mod: CPTII,S$GLB,, | Performed by: OTOLARYNGOLOGY

## 2023-03-28 PROCEDURE — 4010F PR ACE/ARB THEARPY RXD/TAKEN: ICD-10-PCS | Mod: CPTII,S$GLB,, | Performed by: OTOLARYNGOLOGY

## 2023-03-28 PROCEDURE — 31231 NASAL ENDOSCOPY DX: CPT | Mod: S$GLB,,, | Performed by: OTOLARYNGOLOGY

## 2023-03-28 PROCEDURE — 3078F PR MOST RECENT DIASTOLIC BLOOD PRESSURE < 80 MM HG: ICD-10-PCS | Mod: CPTII,S$GLB,, | Performed by: OTOLARYNGOLOGY

## 2023-03-28 PROCEDURE — 99213 OFFICE O/P EST LOW 20 MIN: CPT | Mod: 25,S$GLB,, | Performed by: OTOLARYNGOLOGY

## 2023-03-28 PROCEDURE — 1159F PR MEDICATION LIST DOCUMENTED IN MEDICAL RECORD: ICD-10-PCS | Mod: CPTII,S$GLB,, | Performed by: OTOLARYNGOLOGY

## 2023-03-28 PROCEDURE — 1101F PR PT FALLS ASSESS DOC 0-1 FALLS W/OUT INJ PAST YR: ICD-10-PCS | Mod: CPTII,S$GLB,, | Performed by: OTOLARYNGOLOGY

## 2023-03-28 PROCEDURE — 3078F DIAST BP <80 MM HG: CPT | Mod: CPTII,S$GLB,, | Performed by: OTOLARYNGOLOGY

## 2023-03-28 PROCEDURE — 4010F ACE/ARB THERAPY RXD/TAKEN: CPT | Mod: CPTII,S$GLB,, | Performed by: OTOLARYNGOLOGY

## 2023-03-28 PROCEDURE — 3008F BODY MASS INDEX DOCD: CPT | Mod: CPTII,S$GLB,, | Performed by: OTOLARYNGOLOGY

## 2023-03-28 PROCEDURE — 99999 PR PBB SHADOW E&M-EST. PATIENT-LVL IV: ICD-10-PCS | Mod: PBBFAC,,, | Performed by: OTOLARYNGOLOGY

## 2023-03-28 PROCEDURE — 87070 CULTURE OTHR SPECIMN AEROBIC: CPT | Performed by: OTOLARYNGOLOGY

## 2023-03-28 PROCEDURE — 3008F PR BODY MASS INDEX (BMI) DOCUMENTED: ICD-10-PCS | Mod: CPTII,S$GLB,, | Performed by: OTOLARYNGOLOGY

## 2023-03-28 PROCEDURE — 3074F SYST BP LT 130 MM HG: CPT | Mod: CPTII,S$GLB,, | Performed by: OTOLARYNGOLOGY

## 2023-03-28 PROCEDURE — 1101F PT FALLS ASSESS-DOCD LE1/YR: CPT | Mod: CPTII,S$GLB,, | Performed by: OTOLARYNGOLOGY

## 2023-03-28 PROCEDURE — 99999 PR PBB SHADOW E&M-EST. PATIENT-LVL IV: CPT | Mod: PBBFAC,,, | Performed by: OTOLARYNGOLOGY

## 2023-03-28 PROCEDURE — 87075 CULTR BACTERIA EXCEPT BLOOD: CPT | Performed by: OTOLARYNGOLOGY

## 2023-03-28 PROCEDURE — 31231 NASAL/SINUS ENDOSCOPY: ICD-10-PCS | Mod: S$GLB,,, | Performed by: OTOLARYNGOLOGY

## 2023-03-28 PROCEDURE — 1159F MED LIST DOCD IN RCRD: CPT | Mod: CPTII,S$GLB,, | Performed by: OTOLARYNGOLOGY

## 2023-03-28 PROCEDURE — 99213 PR OFFICE/OUTPT VISIT, EST, LEVL III, 20-29 MIN: ICD-10-PCS | Mod: 25,S$GLB,, | Performed by: OTOLARYNGOLOGY

## 2023-03-28 PROCEDURE — 3288F FALL RISK ASSESSMENT DOCD: CPT | Mod: CPTII,S$GLB,, | Performed by: OTOLARYNGOLOGY

## 2023-03-28 NOTE — PROCEDURES
Nasal/sinus endoscopy    Date/Time: 3/28/2023 9:30 AM  Performed by: Nathaniel Goode MD  Authorized by: Nathaniel Goode MD     Consent Done?:  Yes (Verbal)  Anesthesia:     Local anesthetic:  Lidocaine 4% and Chad-Synephrine 1/2%    Patient tolerance:  Patient tolerated the procedure well with no immediate complications  Nose:     Procedure Performed:  Nasal Endoscopy  External:      No external nasal deformity  Intranasal:      Mucosa no polyps     Mucosa ulcers not present     No mucosa lesions present     Turbinates not enlarged     No septum gross deformity  Nasopharynx:      No mucosa lesions     Adenoids not present     Posterior choanae patent     Eustachian tube patent     Right maxillary sinus patent  Mucopurulent exudate swabbed for culture around right MT

## 2023-03-28 NOTE — PROGRESS NOTES
Subjective:      Bhavna is a 67 y.o. female who comes for follow-up of sinusitis. Her last visit with me was on 1/24/2023. Now nearly 2 years status-post endoscopic sinus surgery in office.  Ongoing right nasal discharge, postnasal drip, cough with green-yellow mucus.  Cefdinir didn't help.  Taking robitussin-DM, flonase prn.        %       The patient's medications, allergies, past medical, surgical, social and family histories were reviewed and updated as appropriate.    A detailed review of systems was obtained with pertinent positives as per the above HPI, and otherwise negative.        Objective:     /64 (BP Location: Right arm, Patient Position: Sitting)   Pulse 77   Wt 46.4 kg (102 lb 4.7 oz)   BMI 18.12 kg/m²        Constitutional:   She appears well-developed. She is cooperative.     Head:  Normocephalic.     Nose:  No mucosal edema, rhinorrhea, septal deviation or polyps. No epistaxis. Turbinates normal, no turbinate masses and no turbinate hypertrophy.  Right sinus exhibits no maxillary sinus tenderness and no frontal sinus tenderness. Left sinus exhibits no maxillary sinus tenderness and no frontal sinus tenderness.     Mouth/Throat  Oropharynx clear and moist without lesions or asymmetry. No oropharyngeal exudate or posterior oropharyngeal erythema.     Neck:  No adenopathy. Normal range of motion present.     She has no cervical adenopathy.     Procedure    Nasal endoscopy performed.  See procedure note.        Data Reviewed    WBC (K/uL)   Date Value   03/22/2023 7.62     Eosinophil % (%)   Date Value   03/22/2023 2.8     Eos, Fluid (%)   Date Value   05/05/2014 5     Eos # (K/uL)   Date Value   03/22/2023 0.2     Platelets (K/uL)   Date Value   03/22/2023 403     Glucose (mg/dL)   Date Value   03/22/2023 83     No results found for: IGE    Pathology report indicated non-eosinophilic chronic inflammation.  Cultures showed Curvularia in Nov 2022, no growth in Jan 2023.      Assessment:     1.  Chronic maxillary sinusitis    2. Chronic cough         Plan:     New cultures taken, will consider different antibiotic.  Increase flonase use to daily consistent use.  Follow up for test results.

## 2023-03-30 ENCOUNTER — CLINICAL SUPPORT (OUTPATIENT)
Dept: REHABILITATION | Facility: HOSPITAL | Age: 68
End: 2023-03-30
Attending: STUDENT IN AN ORGANIZED HEALTH CARE EDUCATION/TRAINING PROGRAM
Payer: MEDICARE

## 2023-03-30 DIAGNOSIS — Z74.09 IMPAIRED FUNCTIONAL MOBILITY, BALANCE, GAIT, AND ENDURANCE: ICD-10-CM

## 2023-03-30 DIAGNOSIS — R26.89 DECREASED FUNCTIONAL MOBILITY: Primary | ICD-10-CM

## 2023-03-30 DIAGNOSIS — M53.86 DECREASED RANGE OF MOTION OF LUMBAR SPINE: ICD-10-CM

## 2023-03-30 PROCEDURE — 97140 MANUAL THERAPY 1/> REGIONS: CPT | Mod: PN

## 2023-03-30 PROCEDURE — 97110 THERAPEUTIC EXERCISES: CPT | Mod: PN

## 2023-03-30 NOTE — PROGRESS NOTES
[facility]   Physical Therapy Treatment Note     Name: Bhavna Landry  [facility] Number: [id number]    Therapy Diagnosis:   Encounter Diagnoses   Name Primary?    Decreased functional mobility Yes    Impaired functional mobility, balance, gait, and endurance     Decreased range of motion of lumbar spine        Physician: Allyson Galaviz, *    Visit Date: 3/30/2023    Encounter Diagnoses   Name Primary?    SI (sacroiliac) joint dysfunction      Spondylosis of lumbar region without myelopathy or radiculopathy      Decreased functional mobility      Impaired functional mobility, balance, gait, and endurance      Decreased range of motion of lumbar spine     Physician: Allyson Galaviz, *     Physician Orders: PT Eval and Treat   Medical Diagnosis from Referral:   M53.3 (ICD-10-CM) - SI (sacroiliac) joint dysfunction   M47.816 (ICD-10-CM) - Spondylosis of lumbar region without myelopathy or radiculopathy      Evaluation Date: 3/9/2023  Authorization Period Expiration: 4/6/2023  Plan of Care Expiration: 5/4/2023  Progress Note Due: 4/9/2023  Visit # / Visits authorized: 4/11  FOTO: 2/5     Precautions: Standard, asthma, smoker, osteoporosis    PTA Visit #: 1/5     Time In: 7:07 AM  Time Out: 8:00  PM   Total Billable Time: 30 minutes - 1:1 physical therapist      SUBJECTIVE     Pt reports: she was aching for three days following last session. Both sides of sacrum started to hurt.     She was compliant with home exercise program.  Response to previous treatment: some relief   Functional change: Ongoing    Pain: 3/10; 0/10 end of session  Location: bilateral lumbosacral region      OBJECTIVE     Objective Measures updated at progress report unless specified.   (+) left anterior innominate rotation    Treatment     Jessica received the treatments listed below:      therapeutic exercises to develop strength, endurance, ROM, flexibility, posture, and core stabilization for 15 minutes 1:1  "physical therapist assistant including:     Propped hook-lying on red cheese:  Gluteus squeezes: 20x5"  Bridges: 2x10 propped  Hook-lying multifidi iso extension into table: 15x5"  Shotgun technique w/ ball/belt: 20x5" holds at 50% strength  Single knee to chest: 5x10"  Piriformis stretch: 3x30" bilateral  Scaitic nerve glides: 20x bilateral  Transverse abdominus 20x5" holds   Sit to stand from standard height chair 2x10 reps with red theraband above knees     Seated:   Swiss ball roll outs: 20x   Hamstring stretch 3x30" with strap     manual therapy techniques: Joint mobilizations and Soft tissue Mobilization were applied for 15 minutes, including:  Lumbar manual distraction with black belts (gentle, pain-free)  Hip abduction/adduction MET to correct L anterior innominate rotation      Patient Education and Home Exercises     Home Exercises Provided and Patient Education Provided     Education provided:   - home exercise program review  - add sink distraction stretch    Written Home Exercises Provided: Patient instructed to cont prior HEP. Exercises were reviewed and Jessica was able to demonstrate them prior to the end of the session.  Jessica demonstrated good  understanding of the education provided. See EMR under Patient Instructions for exercises provided during therapy sessions    ASSESSMENT   Patient tolerated session well with less episodes of coughing and difficulty breathing as patient was propped in hook-lying. Resolution of pain and symptoms following manual lumbar distraction followed by anterior innominate rotation muscle energy techniques. Continued with gluteus and posterior chain activation with good tolerance and resolution of symptoms at end of session. Patient to monitor how long relief lasts and will communicate to therapist next visit.    Jessica Is progressing well towards her goals.   Pt prognosis is Good.     Pt will continue to benefit from skilled outpatient physical therapy to address the " deficits listed in the problem list box on initial evaluation, provide pt/family education and to maximize pt's level of independence in the home and community environment.     Pt's spiritual, cultural and educational needs considered and pt agreeable to plan of care and goals.     Anticipated barriers to physical therapy: co-morbidities    Goals:   Short Term Goals (4 Weeks):  1. Patient will be compliant with home exercise program to supplement therapy in promoting functional mobility.  2. Patient will perform transverse abdominus contraction with good control to demonstrate improved core strength.  3. Patient will report no pain during thoracolumbar active range of motion to promote functional mobility.  4. Patient will improve impaired lower extremity myotomes/manual muscle tests  to >/=4/5 to improve strength for functional tasks.  5. Patient will reports </= 3/10 pain at rest to improve quality of life.     Long Term Goals (8 Weeks):   1. Patient will improve FOTO score to </= 50% limited to decrease perceived limitation with maintaining/changing body position.   2. Patient will improve bilateral hip external/internal rotation range of motion to 30 degrees to promote functional mobility.  3. Patient will improve impaired lower extremity myotomes/manual muscle tests to >/=4+/5 to improve strength for functional tasks.  4. Patient will report no pain while walking 1 mile to return to daily walks.     PLAN     Continue PT plan of care     Stephanie Castellanos, PT

## 2023-03-31 LAB — BACTERIA SPEC AEROBE CULT: NO GROWTH

## 2023-04-03 ENCOUNTER — TELEPHONE (OUTPATIENT)
Dept: RHEUMATOLOGY | Facility: CLINIC | Age: 68
End: 2023-04-03
Payer: MEDICARE

## 2023-04-03 ENCOUNTER — TELEPHONE (OUTPATIENT)
Dept: OPTOMETRY | Facility: CLINIC | Age: 68
End: 2023-04-03
Payer: MEDICARE

## 2023-04-03 ENCOUNTER — LAB VISIT (OUTPATIENT)
Dept: LAB | Facility: HOSPITAL | Age: 68
End: 2023-04-03
Attending: INTERNAL MEDICINE
Payer: MEDICARE

## 2023-04-03 DIAGNOSIS — L40.50 PSORIATIC ARTHRITIS: ICD-10-CM

## 2023-04-03 DIAGNOSIS — E83.52 HYPERCALCEMIA: ICD-10-CM

## 2023-04-03 LAB
BACTERIA SPEC ANAEROBE CULT: NORMAL
CALCIUM SERPL-MCNC: 10.4 MG/DL (ref 8.7–10.5)
MAGNESIUM SERPL-MCNC: 1.8 MG/DL (ref 1.6–2.6)
PHOSPHATE SERPL-MCNC: 5.4 MG/DL (ref 2.7–4.5)
PTH-INTACT SERPL-MCNC: 17.7 PG/ML (ref 9–77)

## 2023-04-03 PROCEDURE — 86334 IMMUNOFIX E-PHORESIS SERUM: CPT | Mod: 26,,, | Performed by: PATHOLOGY

## 2023-04-03 PROCEDURE — 82310 ASSAY OF CALCIUM: CPT | Performed by: INTERNAL MEDICINE

## 2023-04-03 PROCEDURE — 83970 ASSAY OF PARATHORMONE: CPT | Performed by: INTERNAL MEDICINE

## 2023-04-03 PROCEDURE — 84100 ASSAY OF PHOSPHORUS: CPT | Performed by: INTERNAL MEDICINE

## 2023-04-03 PROCEDURE — 86334 PATHOLOGIST INTERPRETATION IFE: ICD-10-PCS | Mod: 26,,, | Performed by: PATHOLOGY

## 2023-04-03 PROCEDURE — 83735 ASSAY OF MAGNESIUM: CPT | Performed by: INTERNAL MEDICINE

## 2023-04-03 PROCEDURE — 36415 COLL VENOUS BLD VENIPUNCTURE: CPT | Mod: PO | Performed by: INTERNAL MEDICINE

## 2023-04-03 PROCEDURE — 86334 IMMUNOFIX E-PHORESIS SERUM: CPT | Performed by: INTERNAL MEDICINE

## 2023-04-03 RX ORDER — METHOTREXATE 2.5 MG/1
15 TABLET ORAL
Qty: 72 TABLET | Refills: 0 | Status: SHIPPED | OUTPATIENT
Start: 2023-04-03 | End: 2023-04-04 | Stop reason: SDUPTHER

## 2023-04-03 NOTE — TELEPHONE ENCOUNTER
----- Message from Chika Davis OD sent at 4/3/2023  4:31 PM CDT -----  Regarding: FW: Appt  Contact: Bhavna @[phone]    ----- Message -----  From: Flora Jensen MA  Sent: 4/3/2023   4:31 PM CDT  To: Grant Jeronimo Staff, Chika Davis Staff  Subject: Appt                                             Can someone schedule this pt a routine eye exam?  ----- Message -----  From: Kirti Dowling  Sent: 3/30/2023  11:46 AM CDT  To: Flora Jensen MA      ----- Message -----  From: Mary Garvin  Sent: 3/30/2023  11:36 AM CDT  To: Duncan NO Staff    Pt states she had cat sx in 2019 but she is now having blurred vision and she is using an ointment but it is not helping. She would an appt

## 2023-04-03 NOTE — TELEPHONE ENCOUNTER
----- Message from Flora Jensen MA sent at 4/3/2023  4:29 PM CDT -----  Regarding: Appt  Contact: Bhavna @[phone]  Can someone schedule this pt a routine eye exam?  ----- Message -----  From: Kirti Dowling  Sent: 3/30/2023  11:46 AM CDT  To: Flora Jensen MA      ----- Message -----  From: Mary Garvin  Sent: 3/30/2023  11:36 AM CDT  To: Duncan NO Staff    Pt states she had cat sx in 2019 but she is now having blurred vision and she is using an ointment but it is not helping. She would an appt

## 2023-04-04 ENCOUNTER — PATIENT MESSAGE (OUTPATIENT)
Dept: RHEUMATOLOGY | Facility: CLINIC | Age: 68
End: 2023-04-04
Payer: MEDICARE

## 2023-04-04 ENCOUNTER — CLINICAL SUPPORT (OUTPATIENT)
Dept: REHABILITATION | Facility: HOSPITAL | Age: 68
End: 2023-04-04
Payer: MEDICARE

## 2023-04-04 DIAGNOSIS — Z74.09 IMPAIRED FUNCTIONAL MOBILITY, BALANCE, GAIT, AND ENDURANCE: ICD-10-CM

## 2023-04-04 DIAGNOSIS — R26.89 DECREASED FUNCTIONAL MOBILITY: Primary | ICD-10-CM

## 2023-04-04 DIAGNOSIS — M53.86 DECREASED RANGE OF MOTION OF LUMBAR SPINE: ICD-10-CM

## 2023-04-04 LAB — INTERPRETATION SERPL IFE-IMP: NORMAL

## 2023-04-04 PROCEDURE — 97140 MANUAL THERAPY 1/> REGIONS: CPT | Mod: PN

## 2023-04-04 PROCEDURE — 97110 THERAPEUTIC EXERCISES: CPT | Mod: PN

## 2023-04-04 RX ORDER — METHOTREXATE 2.5 MG/1
15 TABLET ORAL
Qty: 72 TABLET | Refills: 0 | Status: SHIPPED | OUTPATIENT
Start: 2023-04-04 | End: 2023-05-23

## 2023-04-04 RX ORDER — METHOTREXATE 2.5 MG/1
15 TABLET ORAL
Qty: 72 TABLET | Refills: 0 | Status: SHIPPED | OUTPATIENT
Start: 2023-04-04 | End: 2023-04-04 | Stop reason: SDUPTHER

## 2023-04-04 NOTE — PROGRESS NOTES
[facility]   Physical Therapy Treatment Note     Name: Bhavna Landry  [facility] Number: [id number]    Therapy Diagnosis:   Encounter Diagnoses   Name Primary?    Decreased functional mobility Yes    Impaired functional mobility, balance, gait, and endurance     Decreased range of motion of lumbar spine        Physician: Allyson Galaviz, *    Visit Date: 4/4/2023    Encounter Diagnoses   Name Primary?    SI (sacroiliac) joint dysfunction      Spondylosis of lumbar region without myelopathy or radiculopathy      Decreased functional mobility      Impaired functional mobility, balance, gait, and endurance      Decreased range of motion of lumbar spine     Physician: Allyson Galaviz, *     Physician Orders: PT Eval and Treat   Medical Diagnosis from Referral:   M53.3 (ICD-10-CM) - SI (sacroiliac) joint dysfunction   M47.816 (ICD-10-CM) - Spondylosis of lumbar region without myelopathy or radiculopathy      Evaluation Date: 3/9/2023  Authorization Period Expiration: 4/6/2023  Plan of Care Expiration: 5/4/2023  Progress Note Due: 4/9/2023  Visit # / Visits authorized: 5/11  FOTO: 5/6     Precautions: Standard, asthma, smoker, osteoporosis    PTA Visit #: 1/5     Time In: 8:05 AM  Time Out: 8:58 AM   Total Billable Time: 30 minutes - 1:1 physical therapist      SUBJECTIVE     Pt reports: she changed the cat litter yesterday and has had pain ever since. However, she was a little better than usual over the weekend. After Thursday's session, she was pain-free for 3 hours until pain returned. However, pain less intense than beginning of physical therapy.     She was compliant with home exercise program.  Response to previous treatment: pain relief for 3 hours   Functional change: Ongoing    Pain: 4/10; 2/10 end of session  Location: bilateral lumbosacral region      OBJECTIVE     Objective Measures updated at progress report unless specified.   (+) left anterior innominate  "rotation    Treatment     Jessica received the treatments listed below:      therapeutic exercises to develop strength, endurance, ROM, flexibility, posture, and core stabilization for 15 minutes 1:1 physical therapist assistant including:     Propped hook-lying on red cheese:  Gluteus squeezes: 20x5"  Double knee to chest with peanut: 25x   Hook-lying multifidi iso extension into table: 15x5"  Gentle, LTRs: 20x  Shotgun technique w/ ball/belt: 20x5" holds at 50% strength  Bridges: 2x10 propped  Hamstring stretch 2x30" with strap   Step ups: 6" step - 15x   Sink stretch: 5x10"  Rows: red theraband - 2x10  Straight arm pull downs: red theraband - 2x10  Low rows: red theraband - 2x10  Swiss ball roll outs: 15x     HELD:  Piriformis stretch: 3x30" bilateral  Scaitic nerve glides: 20x bilateral  Transverse abdominus 20x5" holds   Sit to stand from standard height chair 2x10 reps with red theraband above knees     manual therapy techniques: Joint mobilizations and Soft tissue Mobilization were applied for 15 minutes 1:1 physical therapist  , including:  Lumbar manual distraction with black belts (gentle, pain-free)  Hip abduction/adduction MET to correct L anterior innominate rotation      Patient Education and Home Exercises     Home Exercises Provided and Patient Education Provided     Education provided:   - home exercise program review  - add sink distraction stretch    Written Home Exercises Provided: Patient instructed to cont prior HEP. Exercises were reviewed and Jessica was able to demonstrate them prior to the end of the session.  Jessica demonstrated good  understanding of the education provided. See EMR under Patient Instructions for exercises provided during therapy sessions    ASSESSMENT   Bhavna is a 67 y.o. female referred to outpatient Physical Therapy with a medical diagnosis of M53.3 (ICD-10-CM) - SI (sacroiliac) joint dysfunction and M47.816 (ICD-10-CM) - Spondylosis of lumbar region without myelopathy or " radiculopathy. Patient presents with signs and symptoms consistent with L4-L5 radiculopathy and sacroiliitis. Positive relief with manual lumbar distraction and sink stretch. Began multifidi activation and strengthening for posterior chain stabilization.  Good response to flexion based exercises. Will add more core stabilization next.    Jessica Is progressing well towards her goals.   Pt prognosis is Good.     Pt will continue to benefit from skilled outpatient physical therapy to address the deficits listed in the problem list box on initial evaluation, provide pt/family education and to maximize pt's level of independence in the home and community environment.     Pt's spiritual, cultural and educational needs considered and pt agreeable to plan of care and goals.     Anticipated barriers to physical therapy: co-morbidities    Goals:   Short Term Goals (4 Weeks):  1. Patient will be compliant with home exercise program to supplement therapy in promoting functional mobility.  2. Patient will perform transverse abdominus contraction with good control to demonstrate improved core strength.  3. Patient will report no pain during thoracolumbar active range of motion to promote functional mobility.  4. Patient will improve impaired lower extremity myotomes/manual muscle tests  to >/=4/5 to improve strength for functional tasks.  5. Patient will reports </= 3/10 pain at rest to improve quality of life.     Long Term Goals (8 Weeks):   1. Patient will improve FOTO score to </= 50% limited to decrease perceived limitation with maintaining/changing body position.   2. Patient will improve bilateral hip external/internal rotation range of motion to 30 degrees to promote functional mobility.  3. Patient will improve impaired lower extremity myotomes/manual muscle tests to >/=4+/5 to improve strength for functional tasks.  4. Patient will report no pain while walking 1 mile to return to daily walks.     PLAN     Continue PT  plan of care     Stephanie Castellanos, PT

## 2023-04-04 NOTE — TELEPHONE ENCOUNTER
Returned patient call in regards to canceling appointment on 4/25 patient did not answer call.     ----- Message from Jenaesang [place] sent at 4/3/2023  9:52 AM CDT -----  Contact: @[phone]  Pt is calling in stating that she would like a refill on prescription (methotrexate 2.5 MG Tab), pt states that she is out and will not make it to her upcoming appt on 4/25. Please call to discuss further.    [id number] - [place] - [address] AT [facility] [address]  [address] [address]  Phone: [phone] Fax: [phone]

## 2023-04-05 LAB — PATHOLOGIST INTERPRETATION IFE: NORMAL

## 2023-04-06 ENCOUNTER — CLINICAL SUPPORT (OUTPATIENT)
Dept: REHABILITATION | Facility: HOSPITAL | Age: 68
End: 2023-04-06
Payer: MEDICARE

## 2023-04-06 ENCOUNTER — TELEPHONE (OUTPATIENT)
Dept: ADMINISTRATIVE | Facility: OTHER | Age: 68
End: 2023-04-06
Payer: MEDICARE

## 2023-04-06 DIAGNOSIS — Z74.09 IMPAIRED FUNCTIONAL MOBILITY, BALANCE, GAIT, AND ENDURANCE: ICD-10-CM

## 2023-04-06 DIAGNOSIS — R26.89 DECREASED FUNCTIONAL MOBILITY: Primary | ICD-10-CM

## 2023-04-06 DIAGNOSIS — M53.86 DECREASED RANGE OF MOTION OF LUMBAR SPINE: ICD-10-CM

## 2023-04-06 PROCEDURE — 97140 MANUAL THERAPY 1/> REGIONS: CPT | Mod: PN,CQ

## 2023-04-06 PROCEDURE — 97110 THERAPEUTIC EXERCISES: CPT | Mod: PN,CQ

## 2023-04-06 NOTE — PROGRESS NOTES
[facility]   Physical Therapy Treatment Note     Name: Bhavna Landry  [facility] Number: [id number]    Therapy Diagnosis:   Encounter Diagnoses   Name Primary?    Decreased functional mobility Yes    Impaired functional mobility, balance, gait, and endurance     Decreased range of motion of lumbar spine            Physician: Allyson Galaviz, *    Visit Date: 4/6/2023    Encounter Diagnoses   Name Primary?    SI (sacroiliac) joint dysfunction      Spondylosis of lumbar region without myelopathy or radiculopathy      Decreased functional mobility      Impaired functional mobility, balance, gait, and endurance      Decreased range of motion of lumbar spine     Physician: Allyson Galaviz, *     Physician Orders: PT Eval and Treat   Medical Diagnosis from Referral:   M53.3 (ICD-10-CM) - SI (sacroiliac) joint dysfunction   M47.816 (ICD-10-CM) - Spondylosis of lumbar region without myelopathy or radiculopathy      Evaluation Date: 3/9/2023  Authorization Period Expiration: 4/6/2023  Plan of Care Expiration: 5/4/2023  Progress Note Due: 4/9/2023  Visit # / Visits authorized: 6/11  FOTO: 6/6     Precautions: Standard, asthma, smoker, osteoporosis    PTA Visit #: 1/5     Time In: 1300  Time Out 1355  Total Billable Time: 55 minutes - 1:1 physical therapist assistant      SUBJECTIVE     Pt reports: she reports increased left low back pain 3 hours following last visit. She is unable to discern exactly which exercise if any she didn't tolerate. She also feels like her sleeping position may have con  However, pain less intense than beginning of physical therapy.     She was compliant with home exercise program.  Response to previous treatment: pain relief for 3 hours   Functional change: Ongoing    Pain: 5/10; 2/10 end of session  Location: bilateral lumbosacral region      OBJECTIVE     Objective Measures updated at progress report unless specified.   (+) left anterior innominate  "rotation    Treatment     Jessica received the treatments listed below:      therapeutic exercises to develop strength, endurance, ROM, flexibility, posture, and core stabilization for 40 minutes 1:1 physical therapist assistant including:     Propped hook-lying on red cheese:  Gluteus squeezes: 20x5"  Double knee to chest with peanut: 25x   Hook-lying multifidi iso extension into table: 15x5"  Gentle, LTRs: 20x  Shotgun technique w/ ball/belt: 20x5" holds at 50% strength  Bridges: 2x10 propped  Hamstring stretch 2x30" with strap   Step ups: 6" step - 15x2   Sink stretch: 5x10"  Rows: red theraband - 2x10  Straight arm pull downs: red theraband - 2x10  Low rows: red theraband - 2x10  Swiss ball roll outs: 20x     HELD:  Piriformis stretch: 3x30" bilateral  Scaitic nerve glides: 20x bilateral  Transverse abdominus 20x5" holds   Sit to stand from standard height chair 2x10 reps with red theraband above knees     manual therapy techniques: Joint mobilizations and Soft tissue Mobilization were applied for 15 minutes 1:1 physical therapist assistant  , including:  Lumbar manual distraction with black belts (gentle, pain-free)  Hip abduction/adduction MET to correct L anterior innominate rotation      Patient Education and Home Exercises     Home Exercises Provided and Patient Education Provided     Education provided:   - home exercise program review  - add sink distraction stretch    Written Home Exercises Provided: Patient instructed to cont prior HEP. Exercises were reviewed and Jessica was able to demonstrate them prior to the end of the session.  Jessica demonstrated good  understanding of the education provided. See EMR under Patient Instructions for exercises provided during therapy sessions    ASSESSMENT   Bhavna is a 67 y.o. female referred to outpatient Physical Therapy with a medical diagnosis of M53.3 (ICD-10-CM) - SI (sacroiliac) joint dysfunction and M47.816 (ICD-10-CM) - Spondylosis of lumbar region without " myelopathy or radiculopathy. Patient presents with signs and symptoms consistent with L4-L5 radiculopathy and sacroiliitis. Positive relief with manual lumbar distraction and sink stretch. Began multifidi activation and strengthening for posterior chain stabilization.  Good response to flexion based exercises. Will add more core stabilization next.    Jessica Is progressing well towards her goals.   Pt prognosis is Good.     Pt will continue to benefit from skilled outpatient physical therapy to address the deficits listed in the problem list box on initial evaluation, provide pt/family education and to maximize pt's level of independence in the home and community environment.     Pt's spiritual, cultural and educational needs considered and pt agreeable to plan of care and goals.     Anticipated barriers to physical therapy: co-morbidities    Goals:   Short Term Goals (4 Weeks):  1. Patient will be compliant with home exercise program to supplement therapy in promoting functional mobility.  2. Patient will perform transverse abdominus contraction with good control to demonstrate improved core strength.  3. Patient will report no pain during thoracolumbar active range of motion to promote functional mobility.  4. Patient will improve impaired lower extremity myotomes/manual muscle tests  to >/=4/5 to improve strength for functional tasks.  5. Patient will reports </= 3/10 pain at rest to improve quality of life.     Long Term Goals (8 Weeks):   1. Patient will improve FOTO score to </= 50% limited to decrease perceived limitation with maintaining/changing body position.   2. Patient will improve bilateral hip external/internal rotation range of motion to 30 degrees to promote functional mobility.  3. Patient will improve impaired lower extremity myotomes/manual muscle tests to >/=4+/5 to improve strength for functional tasks.  4. Patient will report no pain while walking 1 mile to return to daily walks.     PLAN      Continue PT plan of care     Rusty Haley, PTA

## 2023-04-10 ENCOUNTER — OFFICE VISIT (OUTPATIENT)
Dept: PSYCHIATRY | Facility: CLINIC | Age: 68
End: 2023-04-10
Payer: COMMERCIAL

## 2023-04-10 DIAGNOSIS — R29.818 EXTRAPYRAMIDAL SYMPTOM: ICD-10-CM

## 2023-04-10 DIAGNOSIS — F33.42 RECURRENT MAJOR DEPRESSIVE DISORDER, IN FULL REMISSION: ICD-10-CM

## 2023-04-10 DIAGNOSIS — F20.0 PARANOID SCHIZOPHRENIA: Primary | ICD-10-CM

## 2023-04-10 DIAGNOSIS — F41.9 ANXIETY: ICD-10-CM

## 2023-04-10 DIAGNOSIS — G47.00 INSOMNIA, UNSPECIFIED TYPE: ICD-10-CM

## 2023-04-10 PROCEDURE — 4010F ACE/ARB THERAPY RXD/TAKEN: CPT | Mod: CPTII,S$GLB,, | Performed by: INTERNAL MEDICINE

## 2023-04-10 PROCEDURE — 1159F MED LIST DOCD IN RCRD: CPT | Mod: CPTII,S$GLB,, | Performed by: INTERNAL MEDICINE

## 2023-04-10 PROCEDURE — 4010F PR ACE/ARB THEARPY RXD/TAKEN: ICD-10-PCS | Mod: CPTII,S$GLB,, | Performed by: INTERNAL MEDICINE

## 2023-04-10 PROCEDURE — 1159F PR MEDICATION LIST DOCUMENTED IN MEDICAL RECORD: ICD-10-PCS | Mod: CPTII,S$GLB,, | Performed by: INTERNAL MEDICINE

## 2023-04-10 PROCEDURE — 1160F PR REVIEW ALL MEDS BY PRESCRIBER/CLIN PHARMACIST DOCUMENTED: ICD-10-PCS | Mod: CPTII,S$GLB,, | Performed by: INTERNAL MEDICINE

## 2023-04-10 PROCEDURE — 99214 PR OFFICE/OUTPT VISIT, EST, LEVL IV, 30-39 MIN: ICD-10-PCS | Mod: S$GLB,,, | Performed by: INTERNAL MEDICINE

## 2023-04-10 PROCEDURE — 1160F RVW MEDS BY RX/DR IN RCRD: CPT | Mod: CPTII,S$GLB,, | Performed by: INTERNAL MEDICINE

## 2023-04-10 PROCEDURE — 99999 PR PBB SHADOW E&M-EST. PATIENT-LVL I: ICD-10-PCS | Mod: PBBFAC,,, | Performed by: INTERNAL MEDICINE

## 2023-04-10 PROCEDURE — 99214 OFFICE O/P EST MOD 30 MIN: CPT | Mod: S$GLB,,, | Performed by: INTERNAL MEDICINE

## 2023-04-10 PROCEDURE — 99999 PR PBB SHADOW E&M-EST. PATIENT-LVL I: CPT | Mod: PBBFAC,,, | Performed by: INTERNAL MEDICINE

## 2023-04-10 NOTE — PROGRESS NOTES
OUTPATIENT PSYCHIATRY RETURN VISIT    ENCOUNTER DATE:  4/10/2023  SITE:  [facility]  LENGTH OF SESSION:  20 minutes    CHIEF COMPLAINT:  Follow-up      HISTORY OF PRESENTING ILLNESS:  Bhavna Landry is a 67 y.o. female with history of Paranoid Schizophrenia, Depression, and Insomnia who presents for follow up appointment.     Plan at last appointment on 2/24/2023:  Restart Effexor 75mg daily for anxiety.  Check sodium in 1 month (already scheduled by Oncology).  If sodium is normal and anxiety still not well controlled, could consider increasing back to 150mg daily.    Continue Risperdal 2mg/4mg, Neurontin 300mg qHS, and Cogentin 0.5mg BID.  She also wants to restart Neurontin 100mg PRN during the day for anxiety.  Discussed with patient and daughter informed consent, risks versus benefits, alternative treatments, side effect profile and the inherent unpredictability of individual responses to these treatments.  The patient's daughter expresses understanding of the above and displays the capacity to agree with this current plan.    History as told by patient:  Says she is doing ok.  Stress with family is a little better.  She basically cares for granddaughter who is 18 y/o.  Patient stays in her room most of the time.  Does word books, watches TV.  Still some disagreements between patient and daughter about how much each of them pay for rent and groceries.  Patient owns 2/3 of the house.  Patient does wish she got more time with daughter and her family.  She doesn't even sit with them at dinner, patient eats before they get home.  It feels like them and the boys versus herself and granddaughter.  Granddaughter is nonbinary, works at [employer].  Takes Neurontin 100mg PRN anxiety - helpful.  Sleeping well, restless leg well controlled.  Denies depression.  Denies paranoia or AVH.  Reports shuffling gait right when she wakes up in the morning but this resolves as soon as she starts walking.       Medication side effects:  Denies  Medication compliance:  Yes    PSYCHIATRIC REVIEW OF SYSTEMS:  Trouble with sleep:  Denies   Appetite changes:  Denies  Weight changes:  Loss since breast cancer  Lack of energy:  Sometimes  Anhedonia:  Denies  Somatic symptoms:  Denies  Libido:  Not discussed  Anxiety/panic:  Sometimes related to family household stress  Guilty/hopeless:  Denies  Self-injurious behavior/risky behavior:  Denies  Any drugs:  Denies  Alcohol:  Denies    MEDICAL REVIEW OF SYSTEMS:  Complete review of systems performed covering Constitutional, Musculoskeletal, Neurologic.  All systems negative except for that covered in HPI.    PAST PSYCHIATRIC, MEDICAL, AND SOCIAL HISTORY REVIEWED  The patient's past medical, family and social history have been reviewed and updated as appropriate within the electronic medical record - see encounter notes.    MEDICATIONS:    Current Outpatient Medications:     albuterol (PROAIR HFA) 90 mcg/actuation inhaler, 2 puffs qid prn, Disp: 54 g, Rfl: 3    amLODIPine (NORVASC) 5 MG tablet, Take 1 tablet (5 mg total) by mouth every evening., Disp: 90 tablet, Rfl: 1    amoxicillin-clavulanate 875-125mg (AUGMENTIN) 875-125 mg per tablet, Take 1 tablet by mouth 2 (two) times daily., Disp: 20 tablet, Rfl: 0    ascorbic acid, vitamin C, (VITAMIN C) 500 MG tablet, Take 500 mg by mouth once daily., Disp: , Rfl:     aspirin (ECOTRIN) 81 MG EC tablet, Take 81 mg by mouth once daily., Disp: , Rfl:     atorvastatin (LIPITOR) 80 MG tablet, Take 1 tablet (80 mg total) by mouth once daily., Disp: 90 tablet, Rfl: 3    benztropine (COGENTIN) 0.5 MG tablet, Take 1 tablet (0.5 mg total) by mouth 2 (two) times daily., Disp: 180 tablet, Rfl: 3    cloNIDine (CATAPRES) 0.1 MG tablet, 1 tab po q day for systolic BP > 160 or DBP >100., Disp: 30 tablet, Rfl: 1    cyanocobalamin 500 MCG tablet, Take 500 mcg by mouth once daily., Disp: , Rfl:     diclofenac sodium (VOLTAREN) 1 % Gel, Apply 2 g topically  once daily., Disp: 200 g, Rfl: 0    exemestane (AROMASIN) 25 mg tablet, Take 1 tablet (25 mg total) by mouth once daily., Disp: 30 tablet, Rfl: 3    ferrous sulfate 325 (65 FE) MG EC tablet, Take 1 tablet (325 mg total) by mouth once daily., Disp: 1 tablet, Rfl: 0    fluticasone (VERAMYST) 27.5 mcg/actuation nasal spray, 2 sprays by Nasal route as needed for Rhinitis., Disp: , Rfl:     folic acid (FOLVITE) 1 MG tablet, Take 1 tablet (1,000 mcg total) by mouth once daily., Disp: 90 tablet, Rfl: 3    gabapentin (NEURONTIN) 100 MG capsule, Take 1 capsule (100 mg total) by mouth 2 (two) times daily as needed (anxiety)., Disp: 180 capsule, Rfl: 3    gabapentin (NEURONTIN) 300 MG capsule, Take 1 capsule (300 mg total) by mouth every evening., Disp: 90 capsule, Rfl: 3    isosorbide mononitrate (IMDUR) 30 MG 24 hr tablet, TAKE 1 TABLET(30 MG) BY MOUTH EVERY DAY, Disp: 30 tablet, Rfl: 11    lisinopriL 10 MG tablet, Take 1 tablet (10 mg total) by mouth once daily., Disp: 90 tablet, Rfl: 3    LUTEIN ORAL, Take by mouth., Disp: , Rfl:     methotrexate 2.5 MG Tab, Take 6 tablets (15 mg total) by mouth every 7 days. Take 3 tabs Mon am and 3 tabs Mon pm, Disp: 72 tablet, Rfl: 0    multivitamin (THERAGRAN) per tablet, Take 1 tablet by mouth once daily., Disp: , Rfl:     omega-3 fatty acids/fish oil (FISH OIL-OMEGA-3 FATTY ACIDS) 300-1,000 mg capsule, Take by mouth once daily., Disp: , Rfl:     omeprazole (PRILOSEC) 20 MG capsule, Take 1 capsule (20 mg total) by mouth once daily., Disp: 30 capsule, Rfl: 2    risperiDONE (RISPERDAL) 2 MG tablet, TAKE 1 TABLET(2 MG) BY MOUTH EVERY MORNING, Disp: 90 tablet, Rfl: 3    risperiDONE (RISPERDAL) 4 MG tablet, Take 1 tablet (4 mg total) by mouth every evening., Disp: 90 tablet, Rfl: 3    traMADoL (ULTRAM) 50 mg tablet, Take 1 tablet (50 mg total) by mouth every 6 (six) hours as needed for Pain., Disp: 30 each, Rfl: 0    venlafaxine (EFFEXOR-XR) 75 MG 24 hr capsule, Take 1 capsule (75 mg  "total) by mouth once daily., Disp: 90 capsule, Rfl: 3    VITAMIN A ORAL, Take by mouth., Disp: , Rfl:     vitamin D (VITAMIN D3) 1000 units Tab, Take 1,000 Units by mouth once daily., Disp: , Rfl:     vitamin E 200 UNIT capsule, Take 200 Units by mouth once daily., Disp: , Rfl:     zinc gluconate 50 mg tablet, Take 50 mg by mouth once daily., Disp: , Rfl:   No current facility-administered medications for this visit.    Facility-Administered Medications Ordered in Other Visits:     tropicamide 1% ophthalmic solution 1 drop, 1 drop, Right Eye, On Call Procedure, Karen Song MD, 1 drop at 08/01/19 0648    ALLERGIES:  Review of patient's allergies indicates:   Allergen Reactions    Etanercept Other (See Comments)     Other reaction(s): recurrent infections    Chloramphenicol sod succinate Hives    Codeine Other (See Comments)     Other reaction(s): Stomach upset. Pt states OK with Percocet    Nickel sutures [surgical stainless steel] Dermatitis     Allergic contact dermatitis    Adhesive Rash       PSYCHIATRIC EXAM:  There were no vitals filed for this visit.  Appearance:  Well groomed, appearing healthy and of stated age  Behavior:  Cooperative, pleasant, no psychomotor agitation or retardation  Speech:  Normal rate, rhythm, prosody, and volume  Mood:  "Pretty good"  Affect:  Euthymic  Thought Process:  Linear, logical, goal directed  Thought Content:  Negative for suicidal ideation, homicidal ideation, delusions or hallucinations.  No paranoia.    Associations:  Intact  Memory:  Fair  Level of Consciousness/Orientation:  Grossly intact  Fund of Knowledge:  Fair  Attention:  Good  Language:  Fluent, able to name abstract and concrete objects  Insight:  Fair  Judgment:  Intact  Psychomotor signs:  Mild ivet buccal movements, no tremor  Gait:  Normal, no shuffling gait      RELEVANT LABS/STUDIES:    AIMS completed 4/10/23  Sodium 136 on 3/22/23    Lab Results   Component Value Date    WBC 7.62 03/22/2023    HGB " 13.2 03/22/2023    HCT 40.5 03/22/2023    MCV 92 03/22/2023     03/22/2023     BMP  Lab Results   Component Value Date     03/22/2023    K 5.1 03/22/2023    CL 99 03/22/2023    CO2 28 03/22/2023    BUN 7 (L) 03/22/2023    CREATININE 0.9 03/22/2023    CALCIUM 10.4 04/03/2023    ANIONGAP 9 03/22/2023    ESTGFRAFRICA >60.0 04/13/2022    EGFRNONAA >60.0 04/13/2022     Lab Results   Component Value Date    ALT 20 03/22/2023    AST 25 03/22/2023    GGT 91 (H) 07/28/2014    ALKPHOS 81 03/22/2023    BILITOT 0.3 03/22/2023     Lab Results   Component Value Date    TSH 2.593 03/22/2023     Lab Results   Component Value Date    HGBA1C 5.3 12/11/2018       IMPRESSION:    Bhavna Landry is a 67 y.o. female with history of Paranoid Schizophrenia, Depression, and Insomnia who presents for follow up appointment.    Status/Progress:  Based on the examination today, the patient's problem(s) is/are inadequately controlled.  New problems have been presented today.    Risk Parameters:  Patient reports no suicidal ideation  Patient reports no homicidal ideation  Patient reports no self-injurious behavior  Patient reports no violent behavior      DIAGNOSES:    ICD-10-CM ICD-9-CM   1. Paranoid schizophrenia  F20.0 295.30   2. Recurrent major depressive disorder, in full remission  F33.42 296.36   3. Anxiety  F41.9 300.00   4. Insomnia, unspecified type  G47.00 780.52   5. Extrapyramidal symptom  R29.818 781.99       PLAN:  Symptoms currently stable.    Continue Effexor 75mg daily, Risperdal 2mg/4mg, Neurontin 300mg qHS, Neurontin 100mg daily PRN anxiety, and Cogentin 0.5mg BID.   Discussed with patient and daughter informed consent, risks versus benefits, alternative treatments, side effect profile and the inherent unpredictability of individual responses to these treatments.  The patient's daughter expresses understanding of the above and displays the capacity to agree with this current plan.    RETURN TO CLINIC:  Follow up  in about 6 months (around 10/10/2023).

## 2023-04-11 ENCOUNTER — CLINICAL SUPPORT (OUTPATIENT)
Dept: REHABILITATION | Facility: HOSPITAL | Age: 68
End: 2023-04-11
Payer: MEDICARE

## 2023-04-11 ENCOUNTER — LAB VISIT (OUTPATIENT)
Dept: LAB | Facility: HOSPITAL | Age: 68
End: 2023-04-11
Attending: INTERNAL MEDICINE
Payer: MEDICARE

## 2023-04-11 DIAGNOSIS — Z74.09 IMPAIRED FUNCTIONAL MOBILITY, BALANCE, GAIT, AND ENDURANCE: ICD-10-CM

## 2023-04-11 DIAGNOSIS — R26.89 DECREASED FUNCTIONAL MOBILITY: Primary | ICD-10-CM

## 2023-04-11 DIAGNOSIS — M53.86 DECREASED RANGE OF MOTION OF LUMBAR SPINE: ICD-10-CM

## 2023-04-11 DIAGNOSIS — L40.50 PSA (PSORIATIC ARTHRITIS): ICD-10-CM

## 2023-04-11 LAB
ALBUMIN SERPL BCP-MCNC: 3.8 G/DL (ref 3.5–5.2)
ALP SERPL-CCNC: 65 U/L (ref 55–135)
ALT SERPL W/O P-5'-P-CCNC: 15 U/L (ref 10–44)
ANION GAP SERPL CALC-SCNC: 10 MMOL/L (ref 8–16)
AST SERPL-CCNC: 21 U/L (ref 10–40)
BASOPHILS # BLD AUTO: 0.06 K/UL (ref 0–0.2)
BASOPHILS NFR BLD: 0.6 % (ref 0–1.9)
BILIRUB SERPL-MCNC: 0.2 MG/DL (ref 0.1–1)
BUN SERPL-MCNC: 6 MG/DL (ref 8–23)
CALCIUM SERPL-MCNC: 9.7 MG/DL (ref 8.7–10.5)
CHLORIDE SERPL-SCNC: 99 MMOL/L (ref 95–110)
CO2 SERPL-SCNC: 25 MMOL/L (ref 23–29)
CREAT SERPL-MCNC: 0.8 MG/DL (ref 0.5–1.4)
CRP SERPL-MCNC: <0.3 MG/L (ref 0–8.2)
DIFFERENTIAL METHOD: ABNORMAL
EOSINOPHIL # BLD AUTO: 0.3 K/UL (ref 0–0.5)
EOSINOPHIL NFR BLD: 3.6 % (ref 0–8)
ERYTHROCYTE [DISTWIDTH] IN BLOOD BY AUTOMATED COUNT: 18.9 % (ref 11.5–14.5)
ERYTHROCYTE [SEDIMENTATION RATE] IN BLOOD BY PHOTOMETRIC METHOD: 17 MM/HR (ref 0–36)
EST. GFR  (NO RACE VARIABLE): >60 ML/MIN/1.73 M^2
GLUCOSE SERPL-MCNC: 84 MG/DL (ref 70–110)
HCT VFR BLD AUTO: 41.5 % (ref 37–48.5)
HGB BLD-MCNC: 13.5 G/DL (ref 12–16)
IMM GRANULOCYTES # BLD AUTO: 0.03 K/UL (ref 0–0.04)
IMM GRANULOCYTES NFR BLD AUTO: 0.3 % (ref 0–0.5)
LYMPHOCYTES # BLD AUTO: 2 K/UL (ref 1–4.8)
LYMPHOCYTES NFR BLD: 21.1 % (ref 18–48)
MCH RBC QN AUTO: 31.2 PG (ref 27–31)
MCHC RBC AUTO-ENTMCNC: 32.5 G/DL (ref 32–36)
MCV RBC AUTO: 96 FL (ref 82–98)
MONOCYTES # BLD AUTO: 0.7 K/UL (ref 0.3–1)
MONOCYTES NFR BLD: 7.6 % (ref 4–15)
NEUTROPHILS # BLD AUTO: 6.3 K/UL (ref 1.8–7.7)
NEUTROPHILS NFR BLD: 66.8 % (ref 38–73)
NRBC BLD-RTO: 0 /100 WBC
PLATELET # BLD AUTO: 389 K/UL (ref 150–450)
PMV BLD AUTO: 8.7 FL (ref 9.2–12.9)
POTASSIUM SERPL-SCNC: 4.6 MMOL/L (ref 3.5–5.1)
PROT SERPL-MCNC: 6.4 G/DL (ref 6–8.4)
RBC # BLD AUTO: 4.33 M/UL (ref 4–5.4)
SODIUM SERPL-SCNC: 134 MMOL/L (ref 136–145)
WBC # BLD AUTO: 9.47 K/UL (ref 3.9–12.7)

## 2023-04-11 PROCEDURE — 85652 RBC SED RATE AUTOMATED: CPT | Performed by: INTERNAL MEDICINE

## 2023-04-11 PROCEDURE — 97110 THERAPEUTIC EXERCISES: CPT | Mod: PN,CQ

## 2023-04-11 PROCEDURE — 36415 COLL VENOUS BLD VENIPUNCTURE: CPT | Mod: PO | Performed by: INTERNAL MEDICINE

## 2023-04-11 PROCEDURE — 86140 C-REACTIVE PROTEIN: CPT | Performed by: INTERNAL MEDICINE

## 2023-04-11 PROCEDURE — 80053 COMPREHEN METABOLIC PANEL: CPT | Performed by: INTERNAL MEDICINE

## 2023-04-11 PROCEDURE — 85025 COMPLETE CBC W/AUTO DIFF WBC: CPT | Performed by: INTERNAL MEDICINE

## 2023-04-11 NOTE — PROGRESS NOTES
[facility]   Physical Therapy Treatment Note     Name: Bhavna Landry  [facility] Number: [id number]    Therapy Diagnosis:   Encounter Diagnoses   Name Primary?    Decreased functional mobility Yes    Impaired functional mobility, balance, gait, and endurance     Decreased range of motion of lumbar spine                Physician: Allyson Galaviz, *    Visit Date: 4/11/2023    Encounter Diagnoses   Name Primary?    SI (sacroiliac) joint dysfunction      Spondylosis of lumbar region without myelopathy or radiculopathy      Decreased functional mobility      Impaired functional mobility, balance, gait, and endurance      Decreased range of motion of lumbar spine     Physician: Allyson Galaviz, *     Physician Orders: PT Eval and Treat   Medical Diagnosis from Referral:   M53.3 (ICD-10-CM) - SI (sacroiliac) joint dysfunction   M47.816 (ICD-10-CM) - Spondylosis of lumbar region without myelopathy or radiculopathy      Evaluation Date: 3/9/2023  Authorization Period Expiration: 4/6/2023  Plan of Care Expiration: 5/4/2023  Progress Note Due: 4/9/2023  Visit # / Visits authorized: 7/11  FOTO: 7/6     Precautions: Standard, asthma, smoker, osteoporosis    PTA Visit #: 2/5     Time In: 1300  Time Out 1345  Total Billable Time: 45 minutes - 1:1 physical therapist assistant      SUBJECTIVE     Pt reports: she reports decreased left low back pain  the entire weekend following last visit.     She was compliant with home exercise program.  Response to previous treatment: pain relief  all weekend and this week.  Functional change: Ongoing    Pain: 1/10; 2/10 end of session  Location: bilateral lumbosacral region      OBJECTIVE     Objective Measures updated at progress report unless specified.   (+) left anterior innominate rotation    Treatment     Jessica received the treatments listed below:      therapeutic exercises to develop strength, endurance, ROM, flexibility, posture, and core  "stabilization for 30 minutes 1:1 physical therapist assistant including:  Additional time supervised    Propped hook-lying on red cheese:  Gluteus squeezes: 20x5"  Double knee to chest with peanut: 25x   Hook-lying multifidi iso extension into table: 15x5"  Gentle, LTRs: 20x  Shotgun technique w/ ball/belt: 20x5" holds at 50% strength  Bridges: 2x10 propped  Hamstring stretch 2x30" with strap   Step ups: 6" step - 15x2   Sink stretch: 5x10"  Rows: red theraband - 2x10  Straight arm pull downs: red theraband - 2x10  Low rows: red theraband - 2x10  Swiss ball roll outs: 20x   Piriformis stretch: 3x30" bilateral  Scaitic nerve glides: 20x bilateral  Transverse abdominus 20x5" holds   Sit to stand from standard height chair 2x10 reps with red theraband above knees     manual therapy techniques: Joint mobilizations and Soft tissue Mobilization were applied for 00 minutes 1:1 physical therapist assistant Not performed   , including:  Lumbar manual distraction with black belts (gentle, pain-free)  Hip abduction/adduction MET to correct L anterior innominate rotation      Patient Education and Home Exercises     Home Exercises Provided and Patient Education Provided     Education provided:   - home exercise program review  - add sink distraction stretch    Written Home Exercises Provided: Patient instructed to cont prior HEP. Exercises were reviewed and Jessica was able to demonstrate them prior to the end of the session.  Jessica demonstrated good  understanding of the education provided. See EMR under Patient Instructions for exercises provided during therapy sessions    ASSESSMENT   Bhavna is a 67 y.o. 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 Patient presents with signs and symptoms consistent with L4-L5 radiculopathy and sacroiliitis.  Presents " with good response since last treatment resulting in 1/10 pain since last seen. Manual treatment deferred today due to pain abolished with exercises. Continued multifidi activation and strengthening for posterior chain stabilization.  Good response to flexion based exercises..    Jessica Is progressing well towards her goals.   Pt prognosis is Good.     Pt will continue to benefit from skilled outpatient physical therapy to address the deficits listed in the problem list box on initial evaluation, provide pt/family education and to maximize pt's level of independence in the home and community environment.     Pt's spiritual, cultural and educational needs considered and pt agreeable to plan of care and goals.     Anticipated barriers to physical therapy: co-morbidities    Goals:   Short Term Goals (4 Weeks):  1. Patient will be compliant with home exercise program to supplement therapy in promoting functional mobility.  2. Patient will perform transverse abdominus contraction with good control to demonstrate improved core strength.  3. Patient will report no pain during thoracolumbar active range of motion to promote functional mobility.  4. Patient will improve impaired lower extremity myotomes/manual muscle tests  to >/=4/5 to improve strength for functional tasks.  5. Patient will reports </= 3/10 pain at rest to improve quality of life.     Long Term Goals (8 Weeks):   1. Patient will improve FOTO score to </= 50% limited to decrease perceived limitation with maintaining/changing body position.   2. Patient will improve bilateral hip external/internal rotation range of motion to 30 degrees to promote functional mobility.  3. Patient will improve impaired lower extremity myotomes/manual muscle tests to >/=4+/5 to improve strength for functional tasks.  4. Patient will report no pain while walking 1 mile to return to daily walks.     PLAN     Continue PT plan of care     Rusty Haley, SCOTT

## 2023-04-12 NOTE — PROGRESS NOTES
[facility]   Physical Therapy Treatment Note     Name: Bhavna Landry  [facility] Number: [id number]    Therapy Diagnosis:   Encounter Diagnoses   Name Primary?    Decreased functional mobility Yes    Impaired functional mobility, balance, gait, and endurance     Decreased range of motion of lumbar spine          Physician: Allyson Galaviz, *    Visit Date: 4/13/2023    Encounter Diagnoses   Name Primary?    SI (sacroiliac) joint dysfunction      Spondylosis of lumbar region without myelopathy or radiculopathy      Decreased functional mobility      Impaired functional mobility, balance, gait, and endurance      Decreased range of motion of lumbar spine     Physician: Allyson Galaviz, *     Physician Orders: PT Eval and Treat   Medical Diagnosis from Referral:   M53.3 (ICD-10-CM) - SI (sacroiliac) joint dysfunction   M47.816 (ICD-10-CM) - Spondylosis of lumbar region without myelopathy or radiculopathy      Evaluation Date: 3/9/2023  Authorization Period Expiration: 4/6/2023  Plan of Care Expiration: 5/4/2023  Progress Note Due: 4/9/2023  Visit # / Visits authorized: 8/11  FOTO: 8/6     Precautions: Standard, asthma, smoker, osteoporosis    PTA Visit #: 3/5     Time In: 1300  Time Out 1350  Total Billable Time: 30 minutes - 1:1 physical therapist assistant      SUBJECTIVE     Pt reports: she reports decreased left low back pain  the entire weekend following last visit.     She was compliant with home exercise program.  Response to previous treatment: pain relief  all weekend and this week.  Functional change: Ongoing    Pain: 1/10; 2/10 end of session  Location: bilateral lumbosacral region      OBJECTIVE     Objective Measures updated at progress report unless specified.   (+) left anterior innominate rotation    Treatment     Jessica received the treatments listed below:      therapeutic exercises to develop strength, endurance, ROM, flexibility, posture, and core stabilization for  "50 minutes 1:1 physical therapist assistant including:  Additional time supervised      Propped hook-lying on red cheese:  Gluteus squeezes: 20x5"  Double knee to chest with peanut: 25x   Hook-lying multifidi iso extension into table: 15x5"  Gentle, LTRs: 20x  Shotgun technique w/ ball/belt: 20x5" holds at 50% strength  Bridges: 2x10 propped  Hamstring stretch 2x30" with strap   Fwd Step ups down retro: 6" step - 15x2   Lateral step ups to 6" step 20x   Sink stretch: 5x10"  Rows: green theraband - 2x10  Straight arm pull downs: green  theraband - 3x10  Low rows: green theraband - 3x10  Swiss ball roll outs: 20x   Piriformis stretch: 3x30" bilateral  Scaitic nerve glides: 20x bilateral  Transverse abdominus 20x5" holds   Sit to stand from standard height chair 3x10 reps with red theraband above knees     manual therapy techniques: Joint mobilizations and Soft tissue Mobilization were applied for 00 minutes 1:1 physical therapist assistant Not performed   , including:  Lumbar manual distraction with black belts (gentle, pain-free)  Hip abduction/adduction MET to correct L anterior innominate rotation      Patient Education and Home Exercises     Home Exercises Provided and Patient Education Provided     Education provided:   - home exercise program review  - add sink distraction stretch    Written Home Exercises Provided: Patient instructed to cont prior HEP. Exercises were reviewed and Jessica was able to demonstrate them prior to the end of the session.  Jessica demonstrated good  understanding of the education provided. See EMR under Patient Instructions for exercises provided during therapy sessions    ASSESSMENT   Bhavna is a 67 y.o. 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 Patient presents with signs and symptoms consistent with L4-L5 " radiculopathy and sacroiliitis.  Presents with good response since last two  treatments resulting in 1/10 pain  fr over one week.  Manual treatment deferred again  today due to pain abolished with exercises. Continued multifidi activation and strengthening for posterior chain stabilization.  Good response to flexion based exercises.    Jessica Is progressing well towards her goals.   Pt prognosis is Good.     Pt will continue to benefit from skilled outpatient physical therapy to address the deficits listed in the problem list box on initial evaluation, provide pt/family education and to maximize pt's level of independence in the home and community environment.     Pt's spiritual, cultural and educational needs considered and pt agreeable to plan of care and goals.     Anticipated barriers to physical therapy: co-morbidities    Goals:   Short Term Goals (4 Weeks):  1. Patient will be compliant with home exercise program to supplement therapy in promoting functional mobility.  2. Patient will perform transverse abdominus contraction with good control to demonstrate improved core strength.  3. Patient will report no pain during thoracolumbar active range of motion to promote functional mobility.  4. Patient will improve impaired lower extremity myotomes/manual muscle tests  to >/=4/5 to improve strength for functional tasks.  5. Patient will reports </= 3/10 pain at rest to improve quality of life.     Long Term Goals (8 Weeks):   1. Patient will improve FOTO score to </= 50% limited to decrease perceived limitation with maintaining/changing body position.   2. Patient will improve bilateral hip external/internal rotation range of motion to 30 degrees to promote functional mobility.  3. Patient will improve impaired lower extremity myotomes/manual muscle tests to >/=4+/5 to improve strength for functional tasks.  4. Patient will report no pain while walking 1 mile to return to daily walks.     PLAN     Continue PT plan  of care     Rusty Haley, PTA

## 2023-04-12 NOTE — PROGRESS NOTES
Your CBC is fine except increased RDW. Will copy Dr. Vargas about that.  The sodium is mildly reduced as it has been in past. The liver and kidney tests are fine. The sed rate and crp inflammation tests are normal. YEN

## 2023-04-13 ENCOUNTER — CLINICAL SUPPORT (OUTPATIENT)
Dept: REHABILITATION | Facility: HOSPITAL | Age: 68
End: 2023-04-13
Payer: MEDICARE

## 2023-04-13 DIAGNOSIS — R26.89 DECREASED FUNCTIONAL MOBILITY: Primary | ICD-10-CM

## 2023-04-13 DIAGNOSIS — Z74.09 IMPAIRED FUNCTIONAL MOBILITY, BALANCE, GAIT, AND ENDURANCE: ICD-10-CM

## 2023-04-13 DIAGNOSIS — M53.86 DECREASED RANGE OF MOTION OF LUMBAR SPINE: ICD-10-CM

## 2023-04-13 PROCEDURE — 97110 THERAPEUTIC EXERCISES: CPT | Mod: PN,CQ

## 2023-04-17 ENCOUNTER — TELEPHONE (OUTPATIENT)
Dept: SURGERY | Facility: CLINIC | Age: 68
End: 2023-04-17
Payer: MEDICARE

## 2023-04-17 NOTE — TELEPHONE ENCOUNTER
Returned call to  regarding message left having Left Breast Pain /Lump. Patient has a History of Breast Cancer. Patient was scheduled an apt to see Kalyani VOGT on 4/18/23 @ 11:30 am . Patient appreciated the return call.

## 2023-04-18 ENCOUNTER — OFFICE VISIT (OUTPATIENT)
Dept: SURGERY | Facility: CLINIC | Age: 68
End: 2023-04-18
Payer: MEDICARE

## 2023-04-18 VITALS
HEART RATE: 71 BPM | WEIGHT: 101 LBS | BODY MASS INDEX: 17.89 KG/M2 | HEIGHT: 63 IN | SYSTOLIC BLOOD PRESSURE: 177 MMHG | DIASTOLIC BLOOD PRESSURE: 75 MMHG

## 2023-04-18 DIAGNOSIS — N63.21 MASS OF UPPER OUTER QUADRANT OF LEFT BREAST: Primary | ICD-10-CM

## 2023-04-18 DIAGNOSIS — Z12.31 SCREENING MAMMOGRAM, ENCOUNTER FOR: ICD-10-CM

## 2023-04-18 DIAGNOSIS — Z85.3 PERSONAL HISTORY OF BREAST CANCER: ICD-10-CM

## 2023-04-18 DIAGNOSIS — Z90.11 S/P MASTECTOMY, RIGHT: ICD-10-CM

## 2023-04-18 DIAGNOSIS — Z12.39 SCREENING BREAST EXAMINATION: ICD-10-CM

## 2023-04-18 PROCEDURE — 1101F PT FALLS ASSESS-DOCD LE1/YR: CPT | Mod: CPTII,S$GLB,, | Performed by: PHYSICIAN ASSISTANT

## 2023-04-18 PROCEDURE — 4010F ACE/ARB THERAPY RXD/TAKEN: CPT | Mod: CPTII,S$GLB,, | Performed by: PHYSICIAN ASSISTANT

## 2023-04-18 PROCEDURE — 1160F PR REVIEW ALL MEDS BY PRESCRIBER/CLIN PHARMACIST DOCUMENTED: ICD-10-PCS | Mod: CPTII,S$GLB,, | Performed by: PHYSICIAN ASSISTANT

## 2023-04-18 PROCEDURE — 3288F PR FALLS RISK ASSESSMENT DOCUMENTED: ICD-10-PCS | Mod: CPTII,S$GLB,, | Performed by: PHYSICIAN ASSISTANT

## 2023-04-18 PROCEDURE — 3078F DIAST BP <80 MM HG: CPT | Mod: CPTII,S$GLB,, | Performed by: PHYSICIAN ASSISTANT

## 2023-04-18 PROCEDURE — 3078F PR MOST RECENT DIASTOLIC BLOOD PRESSURE < 80 MM HG: ICD-10-PCS | Mod: CPTII,S$GLB,, | Performed by: PHYSICIAN ASSISTANT

## 2023-04-18 PROCEDURE — 99999 PR PBB SHADOW E&M-EST. PATIENT-LVL V: ICD-10-PCS | Mod: PBBFAC,,, | Performed by: PHYSICIAN ASSISTANT

## 2023-04-18 PROCEDURE — 3077F PR MOST RECENT SYSTOLIC BLOOD PRESSURE >= 140 MM HG: ICD-10-PCS | Mod: CPTII,S$GLB,, | Performed by: PHYSICIAN ASSISTANT

## 2023-04-18 PROCEDURE — 1159F MED LIST DOCD IN RCRD: CPT | Mod: CPTII,S$GLB,, | Performed by: PHYSICIAN ASSISTANT

## 2023-04-18 PROCEDURE — 3077F SYST BP >= 140 MM HG: CPT | Mod: CPTII,S$GLB,, | Performed by: PHYSICIAN ASSISTANT

## 2023-04-18 PROCEDURE — 3008F PR BODY MASS INDEX (BMI) DOCUMENTED: ICD-10-PCS | Mod: CPTII,S$GLB,, | Performed by: PHYSICIAN ASSISTANT

## 2023-04-18 PROCEDURE — 99999 PR PBB SHADOW E&M-EST. PATIENT-LVL V: CPT | Mod: PBBFAC,,, | Performed by: PHYSICIAN ASSISTANT

## 2023-04-18 PROCEDURE — 1101F PR PT FALLS ASSESS DOC 0-1 FALLS W/OUT INJ PAST YR: ICD-10-PCS | Mod: CPTII,S$GLB,, | Performed by: PHYSICIAN ASSISTANT

## 2023-04-18 PROCEDURE — 3288F FALL RISK ASSESSMENT DOCD: CPT | Mod: CPTII,S$GLB,, | Performed by: PHYSICIAN ASSISTANT

## 2023-04-18 PROCEDURE — 1126F AMNT PAIN NOTED NONE PRSNT: CPT | Mod: CPTII,S$GLB,, | Performed by: PHYSICIAN ASSISTANT

## 2023-04-18 PROCEDURE — 3008F BODY MASS INDEX DOCD: CPT | Mod: CPTII,S$GLB,, | Performed by: PHYSICIAN ASSISTANT

## 2023-04-18 PROCEDURE — 99214 OFFICE O/P EST MOD 30 MIN: CPT | Mod: S$GLB,,, | Performed by: PHYSICIAN ASSISTANT

## 2023-04-18 PROCEDURE — 99214 PR OFFICE/OUTPT VISIT, EST, LEVL IV, 30-39 MIN: ICD-10-PCS | Mod: S$GLB,,, | Performed by: PHYSICIAN ASSISTANT

## 2023-04-18 PROCEDURE — 1159F PR MEDICATION LIST DOCUMENTED IN MEDICAL RECORD: ICD-10-PCS | Mod: CPTII,S$GLB,, | Performed by: PHYSICIAN ASSISTANT

## 2023-04-18 PROCEDURE — 4010F PR ACE/ARB THEARPY RXD/TAKEN: ICD-10-PCS | Mod: CPTII,S$GLB,, | Performed by: PHYSICIAN ASSISTANT

## 2023-04-18 PROCEDURE — 1126F PR PAIN SEVERITY QUANTIFIED, NO PAIN PRESENT: ICD-10-PCS | Mod: CPTII,S$GLB,, | Performed by: PHYSICIAN ASSISTANT

## 2023-04-18 PROCEDURE — 1160F RVW MEDS BY RX/DR IN RCRD: CPT | Mod: CPTII,S$GLB,, | Performed by: PHYSICIAN ASSISTANT

## 2023-04-18 NOTE — PROGRESS NOTES
[facility]  Department of Surgery    REFERRING PROVIDER: No referring provider defined for this encounter. Sadaf Vargas MD  CHIEF COMPLAINT:   Chief Complaint   Patient presents with    Breast Pain     L breast pain/ lump       DIAGNOSIS:   This is a 67 y.o. 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    TREATMENT:   1. S/p right mastectomy with sentinel node biopsy on 2/7/2023. Dr. Alejo M.D. Surgical Oncology  2. Final Path:  8 mm focus of IDC, grade 2. DCIS also present. 1 node with isolated tumor cells.   3. On Letrozole with Dr. Chari M.D. Medical Oncology     HISTORY OF PRESENT ILLNESS:   Bhavna Landry is a 67 y.o. female comes in for evaluation of a new palpable left breast mass felt on self exam about two weeks ago. Denies associated pain or skin changes.The patient denies constitutional symptoms of night sweats, chills, weight loss, new headaches, visual changes, new back or bony pain, chest pain, or shortness of breath.        Review of Systems: See HPI/Interval History for other systems reviewed.     IMAGING:     3/22/23 Left Screening Mammo:    Findings:   This procedure was performed using tomosynthesis.   Computer-aided detection was utilized in the interpretation of this examination.     The left breast is heterogeneously dense, which may obscure small masses. There is no evidence of suspicious masses, microcalcifications or architectural distortion.     Impression:   No mammographic evidence of malignancy.     BI-RADS Category 1: Negative     Recommendation:  Routine screening mammogram in 1 year is recommended.       MEDICATIONS/ALLERGIES:     Current Outpatient Medications   Medication Sig    albuterol (PROAIR HFA) 90 mcg/actuation inhaler 2 puffs qid prn    amoxicillin-clavulanate 875-125mg (AUGMENTIN) 875-125 mg per tablet Take 1 tablet by mouth 2 (two) times daily.    ascorbic acid, vitamin C, (VITAMIN C) 500 MG tablet Take 500  mg by mouth once daily.    aspirin (ECOTRIN) 81 MG EC tablet Take 81 mg by mouth once daily.    atorvastatin (LIPITOR) 80 MG tablet Take 1 tablet (80 mg total) by mouth once daily.    benztropine (COGENTIN) 0.5 MG tablet Take 1 tablet (0.5 mg total) by mouth 2 (two) times daily.    cloNIDine (CATAPRES) 0.1 MG tablet 1 tab po q day for systolic BP > 160 or DBP >100.    cyanocobalamin 500 MCG tablet Take 500 mcg by mouth once daily.    diclofenac sodium (VOLTAREN) 1 % Gel Apply 2 g topically once daily.    exemestane (AROMASIN) 25 mg tablet Take 1 tablet (25 mg total) by mouth once daily.    ferrous sulfate 325 (65 FE) MG EC tablet Take 1 tablet (325 mg total) by mouth once daily.    fluticasone (VERAMYST) 27.5 mcg/actuation nasal spray 2 sprays by Nasal route as needed for Rhinitis.    folic acid (FOLVITE) 1 MG tablet Take 1 tablet (1,000 mcg total) by mouth once daily.    gabapentin (NEURONTIN) 100 MG capsule Take 1 capsule (100 mg total) by mouth 2 (two) times daily as needed (anxiety).    gabapentin (NEURONTIN) 300 MG capsule Take 1 capsule (300 mg total) by mouth every evening.    isosorbide mononitrate (IMDUR) 30 MG 24 hr tablet TAKE 1 TABLET(30 MG) BY MOUTH EVERY DAY    lisinopriL 10 MG tablet Take 1 tablet (10 mg total) by mouth once daily.    LUTEIN ORAL Take by mouth.    methotrexate 2.5 MG Tab Take 6 tablets (15 mg total) by mouth every 7 days. Take 3 tabs Mon am and 3 tabs Mon pm    multivitamin (THERAGRAN) per tablet Take 1 tablet by mouth once daily.    omega-3 fatty acids/fish oil (FISH OIL-OMEGA-3 FATTY ACIDS) 300-1,000 mg capsule Take by mouth once daily.    omeprazole (PRILOSEC) 20 MG capsule Take 1 capsule (20 mg total) by mouth once daily.    risperiDONE (RISPERDAL) 2 MG tablet TAKE 1 TABLET(2 MG) BY MOUTH EVERY MORNING    risperiDONE (RISPERDAL) 4 MG tablet Take 1 tablet (4 mg total) by mouth every evening.    traMADoL (ULTRAM) 50 mg tablet Take 1 tablet (50 mg total) by mouth every 6 (six) hours  "as needed for Pain.    venlafaxine (EFFEXOR-XR) 75 MG 24 hr capsule Take 1 capsule (75 mg total) by mouth once daily.    VITAMIN A ORAL Take by mouth.    vitamin D (VITAMIN D3) 1000 units Tab Take 1,000 Units by mouth once daily.    vitamin E 200 UNIT capsule Take 200 Units by mouth once daily.    zinc gluconate 50 mg tablet Take 50 mg by mouth once daily.    amLODIPine (NORVASC) 5 MG tablet Take 1 tablet (5 mg total) by mouth every evening.     No current facility-administered medications for this visit.     Facility-Administered Medications Ordered in Other Visits   Medication    tropicamide 1% ophthalmic solution 1 drop      Review of patient's allergies indicates:   Allergen Reactions    Etanercept Other (See Comments)     Other reaction(s): recurrent infections    Chloramphenicol sod succinate Hives    Codeine Other (See Comments)     Other reaction(s): Stomach upset. Pt states OK with Percocet    Nickel sutures [surgical stainless steel] Dermatitis     Allergic contact dermatitis    Adhesive Rash       PHYSICAL EXAM:   BP (!) 177/75 (BP Location: Left arm, Patient Position: Sitting, BP Method: Medium (Automatic))   Pulse 71   Ht 5' 3" (1.6 m)   Wt 45.8 kg (101 lb)   BMI 17.89 kg/m²     Physical Exam   Vitals reviewed.  Constitutional: She is oriented to person, place, and time.   HENT:   Head: Normocephalic and atraumatic.   Nose: Nose normal.   Eyes: Pupils are equal, round, and reactive to light. Right eye exhibits no discharge. Left eye exhibits no discharge.   Pulmonary/Chest: Effort normal and breath sounds normal. No stridor. No respiratory distress. She exhibits no mass, no tenderness and no edema. Right breast exhibits no mass, no skin change and no tenderness. Left breast exhibits mass. Left breast exhibits no inverted nipple, no nipple discharge, no skin change and no tenderness. No breast swelling or bleeding. Breasts are symmetrical.       Abdominal: Normal appearance.   Genitourinary: No " breast swelling or bleeding.   Neurological: She is alert and oriented to person, place, and time.   Skin: Skin is warm and dry.     Psychiatric: Her behavior is normal. Mood, judgment and thought content normal.     ASSESSMENT:   This is a 67 y.o. female who presents with a new palpable lump of the left breast.   PLAN:   1. CBE with low suspicion. There is a palpable area of density laterally without TTP.   2. Discussed that this finding is likely benign. Recent screening left mammogram in March, BIRADS 1. However,  given focal change, patient prefers imaging correlate to confirm no suspicious abnormality.   3. Diagnostic mammogram and US ordered to evaluate.   4. If negative, follow up in 1 year with mammogram.     The patient is in agreement with the plan. Questions were encouraged and answered to patient's satisfaction. Bhavna will call our office with any questions or concerns.       Total time spent with the patient: 30.  20 minutes of face to face consultation and 10 minutes of chart review and coordination of care.    Kalyani Carson PA-C  Breast Surgery

## 2023-04-19 ENCOUNTER — TELEPHONE (OUTPATIENT)
Dept: REHABILITATION | Facility: HOSPITAL | Age: 68
End: 2023-04-19
Payer: MEDICARE

## 2023-04-19 NOTE — TELEPHONE ENCOUNTER
Return patient's call regarding referral and co-pay. Reminded patient about appointment scheduled tomorrow as she no showed on 4/18/2023.

## 2023-04-20 ENCOUNTER — CLINICAL SUPPORT (OUTPATIENT)
Dept: REHABILITATION | Facility: HOSPITAL | Age: 68
End: 2023-04-20
Payer: MEDICARE

## 2023-04-20 DIAGNOSIS — Z74.09 IMPAIRED FUNCTIONAL MOBILITY, BALANCE, GAIT, AND ENDURANCE: ICD-10-CM

## 2023-04-20 DIAGNOSIS — R26.89 DECREASED FUNCTIONAL MOBILITY: Primary | ICD-10-CM

## 2023-04-20 DIAGNOSIS — M53.86 DECREASED RANGE OF MOTION OF LUMBAR SPINE: ICD-10-CM

## 2023-04-20 PROCEDURE — 97140 MANUAL THERAPY 1/> REGIONS: CPT | Mod: PN,CQ

## 2023-04-20 PROCEDURE — 97110 THERAPEUTIC EXERCISES: CPT | Mod: PN,CQ

## 2023-04-20 NOTE — PROGRESS NOTES
[facility]   Physical Therapy Treatment Note     Name: Bhavna Landry  [facility] Number: [id number]    Therapy Diagnosis:   Encounter Diagnoses   Name Primary?    Decreased functional mobility Yes    Impaired functional mobility, balance, gait, and endurance     Decreased range of motion of lumbar spine          Physician: Allyson Galaviz, *    Visit Date: 4/20/2023    Encounter Diagnoses   Name Primary?    SI (sacroiliac) joint dysfunction      Spondylosis of lumbar region without myelopathy or radiculopathy      Decreased functional mobility      Impaired functional mobility, balance, gait, and endurance      Decreased range of motion of lumbar spine     Physician: Allyson Galaviz, *     Physician Orders: PT Eval and Treat   Medical Diagnosis from Referral:   M53.3 (ICD-10-CM) - SI (sacroiliac) joint dysfunction   M47.816 (ICD-10-CM) - Spondylosis of lumbar region without myelopathy or radiculopathy      Evaluation Date: 3/9/2023  Authorization Period Expiration: 4/6/2023  Plan of Care Expiration: 5/4/2023  Progress Note Due: 4/9/2023  Visit # / Visits authorized: 8/11  FOTO: 8/6     Precautions: Standard, asthma, smoker, osteoporosis    PTA Visit #: 3/5     Time In: 1315  Time Out 1410  Total Billable Time: 45 minutes - 1:1  (TE-2, MT-1) physical therapist assistant      SUBJECTIVE     Pt reports: she reports decreased left low back pain  the entire weekend following last visit.     She was compliant with home exercise program.  Response to previous treatment: pain relief  all weekend and this week.  Functional change: Ongoing    Pain: 1/10; 2/10 end of session  Location: bilateral lumbosacral region      OBJECTIVE     Objective Measures updated at progress report unless specified.   (+) left anterior innominate rotation    Treatment     Jessica received the treatments listed below:      therapeutic exercises to develop strength, endurance, ROM, flexibility, posture, and core  "stabilization for 35 minutes 1:1 physical therapist assistant including:  Additional time supervised      Propped hook-lying on red cheese:  Gluteus squeezes: 20x5"  Double knee to chest with peanut: 25x   Hook-lying multifidi iso extension into table: 15x5"  Gentle, LTRs: 20x  Shotgun technique w/ ball/belt: 20x5" holds at 50% strength  Bridges: 2x10 propped  Hamstring stretch 2x30" with strap   Fwd Step ups down retro: 6" step - 15x2   Lateral step ups to 6" step 20x Not performed  Sink stretch: 5x10"  Rows: green theraband - 2x10  Straight arm pull downs: green  theraband - 3x10  Low rows: green theraband - 3x10  Swiss ball roll outs: 20x   Piriformis stretch: 3x30" bilateral  Scaitic nerve glides: 20x bilateral  Transverse abdominus 20x5" holds   Sit to stand from standard height chair 3x10 reps with red theraband above knees     manual therapy techniques: Joint mobilizations and Soft tissue Mobilization were applied for 10 minutes 1:1 physical therapist assistant  , including:  Lumbar manual distraction with black belts (gentle, pain-free)  Hip abduction/adduction MET to correct L anterior innominate rotation Not performed       Patient Education and Home Exercises     Home Exercises Provided and Patient Education Provided     Education provided:   - home exercise program review  - add sink distraction stretch    Written Home Exercises Provided: Patient instructed to cont prior HEP. Exercises were reviewed and Jessica was able to demonstrate them prior to the end of the session.  Jessica demonstrated good  understanding of the education provided. See EMR under Patient Instructions for exercises provided during therapy sessions    ASSESSMENT   Bhavna is a 67 y.o. 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 Patient presents with signs and " symptoms consistent with L4-L5 radiculopathy and sacroiliitis.  Presents increased pain. Although she reported no pain at that time she feels this may have been due to the lateral step ups performed last visit.   Good with good response to manual lumbar traction with pain abolished.  Continued multifidi activation and strengthening for posterior chain stabilization.  Good response to flexion based exercises.    Jessica Is progressing well towards her goals.   Pt prognosis is Good.     Pt will continue to benefit from skilled outpatient physical therapy to address the deficits listed in the problem list box on initial evaluation, provide pt/family education and to maximize pt's level of independence in the home and community environment.     Pt's spiritual, cultural and educational needs considered and pt agreeable to plan of care and goals.     Anticipated barriers to physical therapy: co-morbidities    Goals:   Short Term Goals (4 Weeks):  1. Patient will be compliant with home exercise program to supplement therapy in promoting functional mobility.  2. Patient will perform transverse abdominus contraction with good control to demonstrate improved core strength.  3. Patient will report no pain during thoracolumbar active range of motion to promote functional mobility.  4. Patient will improve impaired lower extremity myotomes/manual muscle tests  to >/=4/5 to improve strength for functional tasks.  5. Patient will reports </= 3/10 pain at rest to improve quality of life.     Long Term Goals (8 Weeks):   1. Patient will improve FOTO score to </= 50% limited to decrease perceived limitation with maintaining/changing body position.   2. Patient will improve bilateral hip external/internal rotation range of motion to 30 degrees to promote functional mobility.  3. Patient will improve impaired lower extremity myotomes/manual muscle tests to >/=4+/5 to improve strength for functional tasks.  4. Patient will report no pain  while walking 1 mile to return to daily walks.     PLAN     Continue PT plan of care     Rusty Haley, PTA

## 2023-04-21 ENCOUNTER — TELEPHONE (OUTPATIENT)
Dept: PAIN MEDICINE | Facility: CLINIC | Age: 68
End: 2023-04-21
Payer: MEDICARE

## 2023-04-21 NOTE — TELEPHONE ENCOUNTER
----- Message from Idania Rosario sent at 4/21/2023  2:34 PM CDT -----  .Type:  Patient Returning Call    Who Called:pt  Who Left Message for Patient:Chika  Does the patient know what this is regarding?:  Would the patient rather a call back or a response via [facility]? call  Best Call Back Number:[phone]  Additional Information:

## 2023-04-21 NOTE — TELEPHONE ENCOUNTER
----- Message from Chika Mcallister MA sent at 4/20/2023 11:06 AM CDT -----    ----- Message -----  From: Rena Gee  Sent: 4/20/2023  11:04 AM CDT  To: [facility] Staff    Type:  Needs Medical Advice    Who Called: pt  Would the patient rather a call back or a response via [facility]? call  Best Call Back Number: [phone]  Additional Information: Pt would like a call regarding up coming surgery on 05/03/2023 please call pt

## 2023-04-27 ENCOUNTER — CLINICAL SUPPORT (OUTPATIENT)
Dept: REHABILITATION | Facility: HOSPITAL | Age: 68
End: 2023-04-27
Payer: MEDICARE

## 2023-04-27 DIAGNOSIS — R26.89 DECREASED FUNCTIONAL MOBILITY: Primary | ICD-10-CM

## 2023-04-27 DIAGNOSIS — Z74.09 IMPAIRED FUNCTIONAL MOBILITY, BALANCE, GAIT, AND ENDURANCE: ICD-10-CM

## 2023-04-27 DIAGNOSIS — M53.86 DECREASED RANGE OF MOTION OF LUMBAR SPINE: ICD-10-CM

## 2023-04-27 PROCEDURE — 97110 THERAPEUTIC EXERCISES: CPT | Mod: PN

## 2023-04-27 PROCEDURE — 97140 MANUAL THERAPY 1/> REGIONS: CPT | Mod: PN

## 2023-04-27 NOTE — PROGRESS NOTES
[facility]   Physical Therapy Treatment Note     Name: Bhavna Landry  [facility] Number: [id number]    Therapy Diagnosis:   Encounter Diagnoses   Name Primary?    Decreased functional mobility Yes    Impaired functional mobility, balance, gait, and endurance     Decreased range of motion of lumbar spine          Physician: Allyson Galaviz, *    Visit Date: 4/27/2023    Encounter Diagnoses   Name Primary?    SI (sacroiliac) joint dysfunction      Spondylosis of lumbar region without myelopathy or radiculopathy      Decreased functional mobility      Impaired functional mobility, balance, gait, and endurance      Decreased range of motion of lumbar spine     Physician: Allyson Galaviz, *     Physician Orders: PT Eval and Treat   Medical Diagnosis from Referral:   M53.3 (ICD-10-CM) - SI (sacroiliac) joint dysfunction   M47.816 (ICD-10-CM) - Spondylosis of lumbar region without myelopathy or radiculopathy      Evaluation Date: 3/9/2023  Authorization Period Expiration: 4/6/2023  Plan of Care Expiration: 5/4/2023  Progress Note Due: 4/9/2023  Visit # / Visits authorized: 9/11  FOTO: 9/11     Precautions: Standard, asthma, smoker, osteoporosis    PTA Visit #: 3/5     Time In: 7:00 am  Time Out: 8:00 am  Total Billable Time: 30 minutes - 1:1  (1 TE, 1 MT)      SUBJECTIVE     Pt reports: missed past few appointments cause of low blood pressure. She had been really dizzy and weak. This morning she did not take her blood pressure medication and feels much better. Plans on calling MD today to update her and figure out plan    She was compliant with home exercise program.  Response to previous treatment: pain relief   Functional change: No longer having radiating pain down lower extremity     Pain: 3/10; 0/10 end of session  Location: bilateral lumbosacral region      OBJECTIVE     Objective Measures updated at progress report unless specified.     4/27/2023  (+) Left PSIS and Glute    BP:  "117/62 mmHg  HR: 78 bpm    Treatment     Jessica received the treatments listed below:      therapeutic exercises to develop strength, endurance, ROM, flexibility, posture, and core stabilization for 47 minutes 1:1 physical therapist including:  Additional time supervised      Prone:   Hip extension: 2x10 bilateral  Hip extension with knee flexed to 90 degrees: 2x10 bilateral    Propped hook-lying on red wedge:  Swiss ball roll outs: 20x   Bridges: 3x10 propped  Double knee to chest with peanut: 25x   Clamshells: red theraband - 2x10  Hook-lying multifidi isometric extension into table: 15x5"  Transverse abdominus contraction: 20x5" holds   Transverse abdominus contraction with alternating march: 10x  Rows: green theraband - 2x10  Straight arm pull downs: green  theraband - 3x10  Low rows: green theraband - 3x10    NEXT: TA contraction with lower extremity extension, update plan of care    manual therapy techniques: Joint mobilizations and Soft tissue Mobilization were applied for 8 minutes 1:1 physical therapist   , including:  Left PA hip mobilizations - Grade II-III (improved pain levels)    Patient Education and Home Exercises     Home Exercises Provided and Patient Education Provided     Education provided:   - home exercise program review  - add sink distraction stretch    Written Home Exercises Provided: Patient instructed to cont prior HEP. Exercises were reviewed and Jessica was able to demonstrate them prior to the end of the session.  Jessica demonstrated good  understanding of the education provided. See EMR under Patient Instructions for exercises provided during therapy sessions    ASSESSMENT   Bhavna is a 67 y.o. 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 Radicular pain has centralized. Biggest complaints includes left sided " sacroiliac joint pain that improved with PA mobilizations to left hip followed by glute activation therex. Added higher level core stability training. Verbalized 0/10 pain levels at end of session. Patient will not be seen for 2 weeks as she had procedures schedule for eye and nerve ablation. Will update plan of care next visit.    Jessica Is progressing well towards her goals.   Pt prognosis is Good.     Pt will continue to benefit from skilled outpatient physical therapy to address the deficits listed in the problem list box on initial evaluation, provide pt/family education and to maximize pt's level of independence in the home and community environment.     Pt's spiritual, cultural and educational needs considered and pt agreeable to plan of care and goals.     Anticipated barriers to physical therapy: co-morbidities    Goals:   Short Term Goals (4 Weeks):  1. Patient will be compliant with home exercise program to supplement therapy in promoting functional mobility.  2. Patient will perform transverse abdominus contraction with good control to demonstrate improved core strength.  3. Patient will report no pain during thoracolumbar active range of motion to promote functional mobility.  4. Patient will improve impaired lower extremity myotomes/manual muscle tests  to >/=4/5 to improve strength for functional tasks.  5. Patient will reports </= 3/10 pain at rest to improve quality of life.     Long Term Goals (8 Weeks):   1. Patient will improve FOTO score to </= 50% limited to decrease perceived limitation with maintaining/changing body position.   2. Patient will improve bilateral hip external/internal rotation range of motion to 30 degrees to promote functional mobility.  3. Patient will improve impaired lower extremity myotomes/manual muscle tests to >/=4+/5 to improve strength for functional tasks.  4. Patient will report no pain while walking 1 mile to return to daily walks.     PLAN   Left hip PA  mobilizations - grade II-III  Gluteus activation/control   Core stability training   Posterior chain strengthening   Updated plan of care         Stephanie Castellanos, PT

## 2023-04-28 ENCOUNTER — OFFICE VISIT (OUTPATIENT)
Dept: DERMATOLOGY | Facility: CLINIC | Age: 68
End: 2023-04-28
Payer: MEDICARE

## 2023-04-28 VITALS — BODY MASS INDEX: 17.89 KG/M2 | WEIGHT: 101 LBS

## 2023-04-28 DIAGNOSIS — L81.4 LENTIGINES: ICD-10-CM

## 2023-04-28 DIAGNOSIS — L57.0 MULTIPLE ACTINIC KERATOSES: Primary | ICD-10-CM

## 2023-04-28 DIAGNOSIS — Z85.828 HISTORY OF SKIN CANCER: ICD-10-CM

## 2023-04-28 PROCEDURE — 3008F BODY MASS INDEX DOCD: CPT | Mod: CPTII,S$GLB,, | Performed by: DERMATOLOGY

## 2023-04-28 PROCEDURE — 1159F PR MEDICATION LIST DOCUMENTED IN MEDICAL RECORD: ICD-10-PCS | Mod: CPTII,S$GLB,, | Performed by: DERMATOLOGY

## 2023-04-28 PROCEDURE — 99999 PR PBB SHADOW E&M-EST. PATIENT-LVL IV: CPT | Mod: PBBFAC,,, | Performed by: DERMATOLOGY

## 2023-04-28 PROCEDURE — 4010F PR ACE/ARB THEARPY RXD/TAKEN: ICD-10-PCS | Mod: CPTII,S$GLB,, | Performed by: DERMATOLOGY

## 2023-04-28 PROCEDURE — 17000 PR DESTRUCTION(LASER SURGERY,CRYOSURGERY,CHEMOSURGERY),PREMALIGNANT LESIONS,FIRST LESION: ICD-10-PCS | Mod: S$GLB,,, | Performed by: DERMATOLOGY

## 2023-04-28 PROCEDURE — 3008F PR BODY MASS INDEX (BMI) DOCUMENTED: ICD-10-PCS | Mod: CPTII,S$GLB,, | Performed by: DERMATOLOGY

## 2023-04-28 PROCEDURE — 1101F PR PT FALLS ASSESS DOC 0-1 FALLS W/OUT INJ PAST YR: ICD-10-PCS | Mod: CPTII,S$GLB,, | Performed by: DERMATOLOGY

## 2023-04-28 PROCEDURE — 3288F FALL RISK ASSESSMENT DOCD: CPT | Mod: CPTII,S$GLB,, | Performed by: DERMATOLOGY

## 2023-04-28 PROCEDURE — 1160F RVW MEDS BY RX/DR IN RCRD: CPT | Mod: CPTII,S$GLB,, | Performed by: DERMATOLOGY

## 2023-04-28 PROCEDURE — 1160F PR REVIEW ALL MEDS BY PRESCRIBER/CLIN PHARMACIST DOCUMENTED: ICD-10-PCS | Mod: CPTII,S$GLB,, | Performed by: DERMATOLOGY

## 2023-04-28 PROCEDURE — 99214 PR OFFICE/OUTPT VISIT, EST, LEVL IV, 30-39 MIN: ICD-10-PCS | Mod: 25,S$GLB,, | Performed by: DERMATOLOGY

## 2023-04-28 PROCEDURE — 17000 DESTRUCT PREMALG LESION: CPT | Mod: S$GLB,,, | Performed by: DERMATOLOGY

## 2023-04-28 PROCEDURE — 99999 PR PBB SHADOW E&M-EST. PATIENT-LVL IV: ICD-10-PCS | Mod: PBBFAC,,, | Performed by: DERMATOLOGY

## 2023-04-28 PROCEDURE — 17003 DESTRUCTION, PREMALIGNANT LESIONS; SECOND THROUGH 14 LESIONS: ICD-10-PCS | Mod: S$GLB,,, | Performed by: DERMATOLOGY

## 2023-04-28 PROCEDURE — 1126F PR PAIN SEVERITY QUANTIFIED, NO PAIN PRESENT: ICD-10-PCS | Mod: CPTII,S$GLB,, | Performed by: DERMATOLOGY

## 2023-04-28 PROCEDURE — 17003 DESTRUCT PREMALG LES 2-14: CPT | Mod: S$GLB,,, | Performed by: DERMATOLOGY

## 2023-04-28 PROCEDURE — 3288F PR FALLS RISK ASSESSMENT DOCUMENTED: ICD-10-PCS | Mod: CPTII,S$GLB,, | Performed by: DERMATOLOGY

## 2023-04-28 PROCEDURE — 99214 OFFICE O/P EST MOD 30 MIN: CPT | Mod: 25,S$GLB,, | Performed by: DERMATOLOGY

## 2023-04-28 PROCEDURE — 4010F ACE/ARB THERAPY RXD/TAKEN: CPT | Mod: CPTII,S$GLB,, | Performed by: DERMATOLOGY

## 2023-04-28 PROCEDURE — 1159F MED LIST DOCD IN RCRD: CPT | Mod: CPTII,S$GLB,, | Performed by: DERMATOLOGY

## 2023-04-28 PROCEDURE — 1101F PT FALLS ASSESS-DOCD LE1/YR: CPT | Mod: CPTII,S$GLB,, | Performed by: DERMATOLOGY

## 2023-04-28 PROCEDURE — 1126F AMNT PAIN NOTED NONE PRSNT: CPT | Mod: CPTII,S$GLB,, | Performed by: DERMATOLOGY

## 2023-04-28 NOTE — PROGRESS NOTES
Subjective:      Patient ID:  Bhavna Landry is a 67 y.o. female who presents for   Chief Complaint   Patient presents with    Lesion     Face, neck. Several months     Follow-up     UBSE     Would like skin check, few new spots on face and arm.      Lesion - Follow-up  Affected locations: face, scalp, left arm, right arm, chest, torso, back and abdomen    Follow-up    Review of Systems   Constitutional:  Negative for fever, chills, weight loss, weight gain, fatigue, night sweats and malaise.   Skin:  Negative for daily sunscreen use, activity-related sunscreen use and wears hat.   Hematologic/Lymphatic: Bruises/bleeds easily.     Objective:   Physical Exam   Constitutional: She appears well-developed and well-nourished. No distress.   Neurological: She is alert and oriented to person, place, and time. She is not disoriented.   Psychiatric: She has a normal mood and affect.   Skin:   Areas Examined (abnormalities noted in diagram):   Head / Face Inspection Performed  Neck Inspection Performed  Chest / Axilla Inspection Performed  Back Inspection Performed  RUE Inspected  LUE Inspection Performed  Nails and Digits Inspection Performed               Diagram Legend     Erythematous scaling macule/papule c/w actinic keratosis       Vascular papule c/w angioma      Pigmented verrucoid papule/plaque c/w seborrheic keratosis      Yellow umbilicated papule c/w sebaceous hyperplasia      Irregularly shaped tan macule c/w lentigo     1-2 mm smooth white papules consistent with Milia      Movable subcutaneous cyst with punctum c/w epidermal inclusion cyst      Subcutaneous movable cyst c/w pilar cyst      Firm pink to brown papule c/w dermatofibroma      Pedunculated fleshy papule(s) c/w skin tag(s)      Evenly pigmented macule c/w junctional nevus     Mildly variegated pigmented, slightly irregular-bordered macule c/w mildly atypical nevus      Flesh colored to evenly pigmented papule c/w intradermal nevus       Pink pearly  "papule/plaque c/w basal cell carcinoma      Erythematous hyperkeratotic cursted plaque c/w SCC      Surgical scar with no sign of skin cancer recurrence      Open and closed comedones      Inflammatory papules and pustules      Verrucoid papule consistent consistent with wart     Erythematous eczematous patches and plaques     Dystrophic onycholytic nail with subungual debris c/w onychomycosis     Umbilicated papule    Erythematous-base heme-crusted tan verrucoid plaque consistent with inflamed seborrheic keratosis     Erythematous Silvery Scaling Plaque c/w Psoriasis     See annotation      Assessment / Plan:        Multiple actinic keratoses   Cryosurgery Procedure Note    Verbal consent from the patient is obtained and the patient is aware of the precancerous quality and need for treatment of these lesions. Liquid nitrogen cryosurgery is applied to the 5 actinic keratoses, as detailed in the physical exam, to produce a freeze injury.      Lentigines  The "ABCD" rules to observe pigmented lesions were reviewed.      History of skin cancer  Area(s) of previous NMSC evaluated with no signs of recurrence.    . No lesions suspicious for malignancy noted.    Recommend daily sun protection/avoidance and use of at least SPF 30, broad spectrum sunscreen (OTC drug).                Follow up in about 6 months (around 10/28/2023).  "

## 2023-05-01 ENCOUNTER — TELEPHONE (OUTPATIENT)
Dept: PAIN MEDICINE | Facility: HOSPITAL | Age: 68
End: 2023-05-01
Payer: MEDICARE

## 2023-05-01 ENCOUNTER — TELEPHONE (OUTPATIENT)
Dept: PAIN MEDICINE | Facility: CLINIC | Age: 68
End: 2023-05-01
Payer: MEDICARE

## 2023-05-01 NOTE — TELEPHONE ENCOUNTER
Patient notified of 9:20 am arrival time and the following:  Please remember:  Check in at the registration desk on the first floor of the hospital. [address]. [place] [address]  Please DO NOT eat or drink 8 hours prior to your arrival time for the procedure, if diabetic 6 hours prior. If you are taking medications for blood pressure, heart medications, thyroid, cholesterol; you can take them with a small sip of water.  Refrain from drinking alcohol within 24 hours prior to your procedure  You will need someone to drive you home after procedure. You cannot take taxi, Uber, or Lyft.  If you start feeling sick (fever, chills, or coughing) or start on any antibiotics please contact us at [phone] to reschedule.

## 2023-05-01 NOTE — TELEPHONE ENCOUNTER
----- Message from Re Adan sent at 5/1/2023 11:27 AM CDT -----  .Type:  Needs Medical Advice    Who Called: pt    Would the patient rather a call back or a response via [facility]? Call back  Best Call Back Number: [phone]  Additional Information:     Pt stated she is having a procedure on 5/3 and would  like a call back on arrival time

## 2023-05-02 ENCOUNTER — OFFICE VISIT (OUTPATIENT)
Dept: OPHTHALMOLOGY | Facility: CLINIC | Age: 68
End: 2023-05-02
Payer: MEDICARE

## 2023-05-02 DIAGNOSIS — H26.492 POSTERIOR CAPSULAR OPACIFICATION VISUALLY SIGNIFICANT, LEFT EYE: ICD-10-CM

## 2023-05-02 DIAGNOSIS — H26.491 POSTERIOR CAPSULAR OPACIFICATION VISUALLY SIGNIFICANT, RIGHT EYE: Primary | ICD-10-CM

## 2023-05-02 PROCEDURE — 99214 OFFICE O/P EST MOD 30 MIN: CPT | Mod: 57,S$GLB,, | Performed by: OPHTHALMOLOGY

## 2023-05-02 PROCEDURE — 1101F PT FALLS ASSESS-DOCD LE1/YR: CPT | Mod: CPTII,S$GLB,, | Performed by: OPHTHALMOLOGY

## 2023-05-02 PROCEDURE — 4010F PR ACE/ARB THEARPY RXD/TAKEN: ICD-10-PCS | Mod: CPTII,S$GLB,, | Performed by: OPHTHALMOLOGY

## 2023-05-02 PROCEDURE — 4010F ACE/ARB THERAPY RXD/TAKEN: CPT | Mod: CPTII,S$GLB,, | Performed by: OPHTHALMOLOGY

## 2023-05-02 PROCEDURE — 1159F PR MEDICATION LIST DOCUMENTED IN MEDICAL RECORD: ICD-10-PCS | Mod: CPTII,S$GLB,, | Performed by: OPHTHALMOLOGY

## 2023-05-02 PROCEDURE — 99214 PR OFFICE/OUTPT VISIT, EST, LEVL IV, 30-39 MIN: ICD-10-PCS | Mod: 57,S$GLB,, | Performed by: OPHTHALMOLOGY

## 2023-05-02 PROCEDURE — 3288F PR FALLS RISK ASSESSMENT DOCUMENTED: ICD-10-PCS | Mod: CPTII,S$GLB,, | Performed by: OPHTHALMOLOGY

## 2023-05-02 PROCEDURE — 99999 PR PBB SHADOW E&M-EST. PATIENT-LVL III: CPT | Mod: PBBFAC,,, | Performed by: OPHTHALMOLOGY

## 2023-05-02 PROCEDURE — 1126F PR PAIN SEVERITY QUANTIFIED, NO PAIN PRESENT: ICD-10-PCS | Mod: CPTII,S$GLB,, | Performed by: OPHTHALMOLOGY

## 2023-05-02 PROCEDURE — 1126F AMNT PAIN NOTED NONE PRSNT: CPT | Mod: CPTII,S$GLB,, | Performed by: OPHTHALMOLOGY

## 2023-05-02 PROCEDURE — 3288F FALL RISK ASSESSMENT DOCD: CPT | Mod: CPTII,S$GLB,, | Performed by: OPHTHALMOLOGY

## 2023-05-02 PROCEDURE — 66821 YAG CAPSULOTOMY - OS - LEFT EYE: ICD-10-PCS | Mod: 50,S$GLB,, | Performed by: OPHTHALMOLOGY

## 2023-05-02 PROCEDURE — 1159F MED LIST DOCD IN RCRD: CPT | Mod: CPTII,S$GLB,, | Performed by: OPHTHALMOLOGY

## 2023-05-02 PROCEDURE — 99999 PR PBB SHADOW E&M-EST. PATIENT-LVL III: ICD-10-PCS | Mod: PBBFAC,,, | Performed by: OPHTHALMOLOGY

## 2023-05-02 PROCEDURE — 1101F PR PT FALLS ASSESS DOC 0-1 FALLS W/OUT INJ PAST YR: ICD-10-PCS | Mod: CPTII,S$GLB,, | Performed by: OPHTHALMOLOGY

## 2023-05-02 PROCEDURE — 66821 AFTER CATARACT LASER SURGERY: CPT | Mod: 50,S$GLB,, | Performed by: OPHTHALMOLOGY

## 2023-05-02 NOTE — PROGRESS NOTES
HPI    Dr. Roberts    VD OS  PVD OD  PCIOL OU  Duane's OD  VR tag with small flap OD @ 7:00 s/p barrier 10/13 with Dr. Farzaneh Payan OD   Brow ptosis on right  Brief episode of color Va change  Early AMD OU    GTTS: AT's PRN    Pt states that vision is blurry and that OS is worse than OD. Pt states   that OD vision is blurry when she looks through the bottom of her   progressive glasses. Pt denies eye pain.   Last edited by Karen Song MD on 5/2/2023  9:51 AM.            Assessment /Plan     For exam results, see Encounter Report.    Posterior capsular opacification visually significant, right eye  -     Yag Capsulotomy - OS - Left Eye  -     Yag Capsulotomy - OD - Right Eye    Posterior capsular opacification visually significant, left eye  -     Yag Capsulotomy - OS - Left Eye  -     Yag Capsulotomy - OD - Right Eye      Visually significant posterior capsular opacity present.  ou  - discussed risks, benefits, and alternatives to laser surgery - pt wishes to proceed with yag laser  - Informed consent obtained and correct eye(s) verified with patient.  - Intraocular Pressure to be taken 10- 30 minutes post procedure.   - PF QID x 4 days then d/c  - f/up as scheduled    DIAGNOSIS: Visually significant posterior capsular opacity    PROCEDURE: YAG Laser Capsulotomy ou    COMPLICATIONS: none     DESCRIPTION OF PROCEDURE IN DETAIL:  1 drop of topical Proparacaine and Iopidine instilled, and eye(s) dilated with 1% Tropicamide 2.5% Phenylephrine. YAG laser applied to posterior capsule in cruciate pattern.      DISPOSITION:  Patient tolerated procedure well.      Will call pt next wk to ensure doing well s/p yag cap    F/up optom REE

## 2023-05-03 ENCOUNTER — HOSPITAL ENCOUNTER (OUTPATIENT)
Facility: HOSPITAL | Age: 68
Discharge: HOME | End: 2023-05-03
Attending: STUDENT IN AN ORGANIZED HEALTH CARE EDUCATION/TRAINING PROGRAM | Admitting: STUDENT IN AN ORGANIZED HEALTH CARE EDUCATION/TRAINING PROGRAM
Payer: MEDICARE

## 2023-05-03 VITALS
HEART RATE: 73 BPM | OXYGEN SATURATION: 99 % | WEIGHT: 101 LBS | SYSTOLIC BLOOD PRESSURE: 169 MMHG | RESPIRATION RATE: 16 BRPM | TEMPERATURE: 98 F | DIASTOLIC BLOOD PRESSURE: 72 MMHG | HEIGHT: 63 IN | BODY MASS INDEX: 17.89 KG/M2

## 2023-05-03 DIAGNOSIS — G89.29 CHRONIC PAIN: ICD-10-CM

## 2023-05-03 DIAGNOSIS — M47.816 OSTEOARTHRITIS OF LUMBAR SPINE WITHOUT MYELOPATHY OR RADICULOPATHY: Primary | ICD-10-CM

## 2023-05-03 DIAGNOSIS — M47.817 SPONDYLOSIS OF LUMBOSACRAL REGION WITHOUT MYELOPATHY OR RADICULOPATHY: ICD-10-CM

## 2023-05-03 PROCEDURE — 64493 INJ PARAVERT F JNT L/S 1 LEV: CPT | Mod: 50,,, | Performed by: STUDENT IN AN ORGANIZED HEALTH CARE EDUCATION/TRAINING PROGRAM

## 2023-05-03 PROCEDURE — 64493 PR INJ DX/THER AGNT PARAVERT FACET JOINT,IMG GUIDE,LUMBAR/SAC,1ST LVL: ICD-10-PCS | Mod: 50,,, | Performed by: STUDENT IN AN ORGANIZED HEALTH CARE EDUCATION/TRAINING PROGRAM

## 2023-05-03 PROCEDURE — 64494 INJ PARAVERT F JNT L/S 2 LEV: CPT | Mod: 50 | Performed by: STUDENT IN AN ORGANIZED HEALTH CARE EDUCATION/TRAINING PROGRAM

## 2023-05-03 PROCEDURE — 64494 INJ PARAVERT F JNT L/S 2 LEV: CPT | Mod: 50,,, | Performed by: STUDENT IN AN ORGANIZED HEALTH CARE EDUCATION/TRAINING PROGRAM

## 2023-05-03 PROCEDURE — 64493 INJ PARAVERT F JNT L/S 1 LEV: CPT | Mod: 50 | Performed by: STUDENT IN AN ORGANIZED HEALTH CARE EDUCATION/TRAINING PROGRAM

## 2023-05-03 PROCEDURE — 25000003 PHARM REV CODE 250: Performed by: STUDENT IN AN ORGANIZED HEALTH CARE EDUCATION/TRAINING PROGRAM

## 2023-05-03 PROCEDURE — 64494 PR INJ DX/THER AGNT PARAVERT FACET JOINT,IMG GUIDE,LUMBAR/SAC, 2ND LEVEL: ICD-10-PCS | Mod: 50,,, | Performed by: STUDENT IN AN ORGANIZED HEALTH CARE EDUCATION/TRAINING PROGRAM

## 2023-05-03 RX ORDER — ALPRAZOLAM 0.5 MG/1
0.5 TABLET ORAL
Status: DISCONTINUED | OUTPATIENT
Start: 2023-05-03 | End: 2023-05-03 | Stop reason: HOSPADM

## 2023-05-03 RX ORDER — LIDOCAINE HYDROCHLORIDE 20 MG/ML
INJECTION, SOLUTION EPIDURAL; INFILTRATION; INTRACAUDAL; PERINEURAL
Status: DISCONTINUED | OUTPATIENT
Start: 2023-05-03 | End: 2023-05-03 | Stop reason: HOSPADM

## 2023-05-03 RX ORDER — BUPIVACAINE HYDROCHLORIDE 2.5 MG/ML
INJECTION, SOLUTION EPIDURAL; INFILTRATION; INTRACAUDAL
Status: DISCONTINUED | OUTPATIENT
Start: 2023-05-03 | End: 2023-05-03 | Stop reason: HOSPADM

## 2023-05-03 NOTE — DISCHARGE INSTRUCTIONS
Home Care Instructions Pain Management:    1.  DIET:    You may resume your normal diet today.    2.  BATHING:    You may shower with luke warm water.    3.  DRESSING:    You may remove your bandage today.    4.  ACTIVITY LEVEL:      You may resume your normal activities 24 hours after your procedure.    5.  MEDICATIONS:    You may resume your normal medications today.    6.  SPECIAL INSTRUCTIONS:    No heat to the injection site for 24 hours including bath or shower, heating pad, moist heat or hot tubs.    Use an ice pack to the injection site for any pain or discomfort.  Apply ice packs for 20 minute intervals as needed.    If you have received any sedatives by mouth today, you can not drive for 12 hours.    If you have received sedation through an IV, you can not drive for 24 hours.    PLEASE CALL YOUR DOCTOR FOR THE FOLLOWIN.  Redness or swelling around the injection site.  2.  Fever of 101 degrees.  3.  Drainage (pus) from the injection site.  4.  For any continuous bleeding (some dried blood over the incision is normal.)    FOR EMERGENCIES:    If any unusual problems or difficulties occur during clinic hours, call [phone] or dial [phone].    Follow up with with your physician in 2-3 weeks.

## 2023-05-03 NOTE — DISCHARGE SUMMARY
Discharge Note  Short Stay      SUMMARY     Admit Date: 5/3/2023    Attending Physician: Ap Brady      Discharge Physician: Ap Brady      Discharge Date: 5/3/2023 9:54 AM    Procedure(s) (LRB):  Block-nerve-medial branch-lumbar bilateral L3, L4, L5 (Bilateral)    Final Diagnosis: Lumbar spondylosis [M47.816]    Disposition: Home or self care    Patient Instructions:   Current Discharge Medication List        CONTINUE these medications which have NOT CHANGED    Details   amLODIPine (NORVASC) 5 MG tablet Take 1 tablet (5 mg total) by mouth every evening.  Qty: 90 tablet, Refills: 1    Comments: ..Please cancel any previous prescriptions for the same medication including holds.  Associated Diagnoses: Essential hypertension      aspirin (ECOTRIN) 81 MG EC tablet Take 81 mg by mouth once daily.      atorvastatin (LIPITOR) 80 MG tablet Take 1 tablet (80 mg total) by mouth once daily.  Qty: 90 tablet, Refills: 3    Associated Diagnoses: Hyperlipidemia, unspecified hyperlipidemia type      !! gabapentin (NEURONTIN) 300 MG capsule Take 1 capsule (300 mg total) by mouth every evening.  Qty: 90 capsule, Refills: 3    Associated Diagnoses: Anxiety; Insomnia, unspecified type; Restless legs      isosorbide mononitrate (IMDUR) 30 MG 24 hr tablet TAKE 1 TABLET(30 MG) BY MOUTH EVERY DAY  Qty: 30 tablet, Refills: 11      lisinopriL 10 MG tablet Take 0.5 tablets (5 mg total) by mouth once daily.  Qty: 45 tablet, Refills: 0    Comments: .  Associated Diagnoses: Essential hypertension      albuterol (PROAIR HFA) 90 mcg/actuation inhaler 2 puffs qid prn  Qty: 54 g, Refills: 3    Comments: Please inactivate all prior scripts with same name and strength including on holds. Dispense Proair 3 inhalers.      amoxicillin-clavulanate 875-125mg (AUGMENTIN) 875-125 mg per tablet Take 1 tablet by mouth 2 (two) times daily.  Qty: 20 tablet, Refills: 0    Associated Diagnoses: Acute bacterial sinusitis      ascorbic acid,  vitamin C, (VITAMIN C) 500 MG tablet Take 500 mg by mouth once daily.      benztropine (COGENTIN) 0.5 MG tablet Take 1 tablet (0.5 mg total) by mouth 2 (two) times daily.  Qty: 180 tablet, Refills: 3    Associated Diagnoses: Extrapyramidal symptom      cloNIDine (CATAPRES) 0.1 MG tablet 1 tab po q day for systolic BP > 160 or DBP >100.  Qty: 30 tablet, Refills: 1    Comments: .      cyanocobalamin 500 MCG tablet Take 500 mcg by mouth once daily.      diclofenac sodium (VOLTAREN) 1 % Gel Apply 2 g topically once daily.  Qty: 200 g, Refills: 0      exemestane (AROMASIN) 25 mg tablet Take 1 tablet (25 mg total) by mouth once daily.  Qty: 30 tablet, Refills: 3    Associated Diagnoses: 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, estrogen receptor positive      ferrous sulfate 325 (65 FE) MG EC tablet Take 1 tablet (325 mg total) by mouth once daily.  Qty: 1 tablet, Refills: 0      fluticasone (VERAMYST) 27.5 mcg/actuation nasal spray 2 sprays by Nasal route as needed for Rhinitis.      folic acid (FOLVITE) 1 MG tablet Take 1 tablet (1,000 mcg total) by mouth once daily.  Qty: 90 tablet, Refills: 3    Comments: Requesting 1 year supply  Associated Diagnoses: Psoriatic arthritis      !! gabapentin (NEURONTIN) 100 MG capsule Take 1 capsule (100 mg total) by mouth 2 (two) times daily as needed (anxiety).  Qty: 180 capsule, Refills: 3    Associated Diagnoses: Anxiety      LUTEIN ORAL Take by mouth.      methotrexate 2.5 MG Tab Take 6 tablets (15 mg total) by mouth every 7 days. Take 3 tabs Mon am and 3 tabs Mon pm  Qty: 72 tablet, Refills: 0    Associated Diagnoses: Psoriatic arthritis      multivitamin (THERAGRAN) per tablet Take 1 tablet by mouth once daily.      omega-3 fatty acids/fish oil (FISH OIL-OMEGA-3 FATTY ACIDS) 300-1,000 mg capsule Take by mouth once daily.      omeprazole (PRILOSEC) 20 MG capsule Take 1 capsule (20 mg total) by mouth once daily.  Qty: 30 capsule, Refills: 2    Associated  Diagnoses: Gastroesophageal reflux disease, unspecified whether esophagitis present      !! risperiDONE (RISPERDAL) 2 MG tablet TAKE 1 TABLET(2 MG) BY MOUTH EVERY MORNING  Qty: 90 tablet, Refills: 3    Associated Diagnoses: Paranoid schizophrenia      !! risperiDONE (RISPERDAL) 4 MG tablet Take 1 tablet (4 mg total) by mouth every evening.  Qty: 90 tablet, Refills: 3    Associated Diagnoses: Paranoid schizophrenia      traMADoL (ULTRAM) 50 mg tablet Take 1 tablet (50 mg total) by mouth every 6 (six) hours as needed for Pain.  Qty: 30 each, Refills: 0    Comments: Quantity prescribed more than 7 day supply? Yes, quantity medically necessary  Associated Diagnoses: 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, estrogen receptor positive      venlafaxine (EFFEXOR-XR) 75 MG 24 hr capsule Take 1 capsule (75 mg total) by mouth once daily.  Qty: 90 capsule, Refills: 3    Associated Diagnoses: Anxiety; Recurrent major depressive disorder, in full remission      VITAMIN A ORAL Take by mouth.      vitamin D (VITAMIN D3) 1000 units Tab Take 1,000 Units by mouth once daily.      vitamin E 200 UNIT capsule Take 200 Units by mouth once daily.      zinc gluconate 50 mg tablet Take 50 mg by mouth once daily.       !! - Potential duplicate medications found. Please discuss with provider.              Discharge Diagnosis: Lumbar spondylosis [M47.816]  Condition on Discharge: Stable with no complications to procedure   Diet on Discharge: Same as before.  Activity: as per instruction sheet.  Discharge to: Home with a responsible adult.  Follow up: 2-4 weeks       Please call my office or pager at [phone] if experienced any weakness or loss of sensation, fever > 101.5, pain uncontrolled with oral medications, persistent nausea/vomiting/or diarrhea, redness or drainage from the incisions, or any other worrisome concerns. If physician on call was not reached or could not communicate with our office for any reason please  go to the nearest emergency department

## 2023-05-03 NOTE — H&P
HPI  Patient presenting for Procedure(s) (LRB):  Block-nerve-medial branch-lumbar bilateral L3, L4, L5 (Bilateral)     Patient on Anti-coagulation No    No health changes since previous encounter    Past Medical History:   Diagnosis Date    Allergy     Amblyopia     Anemia     Anticoagulant long-term use     Arthritis 1992    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, estrogen receptor positive 2022    Cataract     Depression     Dry eyes     Dry mouth     Duane's syndrome of right eye     Early dry stage nonexudative age-related macular degeneration of both eyes 2022    Fibromyalgia 2014    Fibromyalgia     Fractured hip     RIGHT HIP    GERD (gastroesophageal reflux disease)     History of psychiatric hospitalization     Hyperlipidemia 1992    Hypertension     Hypocalcemia     Hyponatremia     Kidney stone     Migraine headache     Osteoporosis     Psoriatic arthritis 1992    Right knee pain     post knee replacement surgery (possible rejectiion of metal)    RLS (restless legs syndrome)     Schizophrenia 1992    stable on meds    Squamous cell carcinoma of skin     Urinary tract infection      Past Surgical History:   Procedure Laterality Date    brow ptosis repair Right 2019    Surgeon: Dr. Karla Henson    CATARACT EXTRACTION       SECTION      COLONOSCOPY N/A 2016    Procedure: COLONOSCOPY;  Surgeon: ANDRIA Connelly MD;  Location: [facility] ([address] [place] [address]);  Service: Endoscopy;  Laterality: N/A;    COLONOSCOPY N/A 2022    Procedure: COLONOSCOPY;  Surgeon: Mikey Walters MD;  Location: [facility];  Service: Endoscopy;  Laterality: N/A;    cyst removed from right sinus  1982    ESOPHAGOGASTRODUODENOSCOPY N/A 2023    Procedure: EGD (ESOPHAGOGASTRODUODENOSCOPY);  Surgeon: Dougie Shea MD;  Location: [facility];  Service: Endoscopy;  Laterality: N/A;    HYSTERECTOMY      VAGINAL HYSTERECTOMY WITHOUT BSO - ENDOMETRIOSIS    INJECTION  FOR SENTINEL NODE IDENTIFICATION Right 5/9/2022    Procedure: INJECTION, FOR SENTINEL NODE IDENTIFICATION-Right;  Surgeon: NEAL Dhillon MD;  Location: [facility];  Service: General;  Laterality: Right;    INJECTION OF ANESTHETIC AGENT AROUND MEDIAL BRANCH NERVES INNERVATING LUMBAR FACET JOINT N/A 4/11/2019    Procedure: Lumbo-sacral Block, DR5 and Lateral Branches of S1,S2, S3;  Surgeon: Michelle Pineda Jr., MD;  Location: [facility];  Service: Pain Management;  Laterality: N/A;  Pt takes  and states she holds ASA on her own whenever she has procedures.  Instructed to hold x 3 days prior to procedure.      INJECTION OF ANESTHETIC AGENT INTO SACROILIAC JOINT Right 8/30/2018    Procedure: BLOCK, SACROILIAC JOINT-Right- ORAL SEDATION;  Surgeon: Michelle Pineda Jr., MD;  Location: [facility];  Service: Pain Management;  Laterality: Right;    INJECTION OF ANESTHETIC AGENT INTO SACROILIAC JOINT Bilateral 9/27/2018    Procedure: BLOCK, SACROILIAC JOINT-BILATERAL;  Surgeon: Michelle Pineda Jr., MD;  Location: [facility];  Service: Pain Management;  Laterality: Bilateral;  Please keep at 10:00 due to trasnsportation    INJECTION OF ANESTHETIC AGENT INTO SACROILIAC JOINT Bilateral 2/7/2019    Procedure: Bilateral Sacroiliac Joint Injection - Per Dr Pineda, not necessary to hold ASA.;  Surgeon: Michelle Pineda Jr., MD;  Location: [facility];  Service: Pain Management;  Laterality: Bilateral;    INTRAOCULAR PROSTHESES INSERTION Right 8/1/2019    Procedure: INSERTION, IOL PROSTHESIS;  Surgeon: Karen Song MD;  Location: [facility] OR [address] [place] [address];  Service: Ophthalmology;  Laterality: Right;    INTRAOCULAR PROSTHESES INSERTION Left 9/26/2019    Procedure: INSERTION, IOL PROSTHESIS;  Surgeon: Karen Song MD;  Location: [facility] OR [address] [place] [address];  Service: Ophthalmology;  Laterality: Left;    JOINT REPLACEMENT Right     knee    KNEE SURGERY      MASTECTOMY Right 5/9/2022    Procedure: MASTECTOMY-Right;   Surgeon: NEAL Dhillon MD;  Location: [facility];  Service: General;  Laterality: Right;  2.5 HOURS    PHACOEMULSIFICATION OF CATARACT Right 8/1/2019    Procedure: PHACOEMULSIFICATION, CATARACT;  Surgeon: Karen Song MD;  [facility];  Service: Ophthalmology;  Laterality: Right;    PHACOEMULSIFICATION OF CATARACT Left 9/26/2019    Procedure: PHACOEMULSIFICATION, CATARACT;  Surgeon: Karen Song MD;  [facility];  Service: Ophthalmology;  Laterality: Left;    RADIOFREQUENCY THERMOCOAGULATION Right 5/7/2019    Procedure: RADIOFREQUENCY THERMAL COAGULATION RIGHT DORSAL RAMUS 5 AND LATERAL BRANCH OF S1, S2 AND S3;  Surgeon: Michelle Pineda Jr., MD;  Location: [facility];  Service: Pain Management;  Laterality: Right;    RADIOFREQUENCY THERMOCOAGULATION Left 5/14/2019    Procedure: RADIOFREQUENCY THERMAL COAGULATION LEFT DORSAL RAMUS 5 AND LATERAL BRANCH OF S1,S2 AND S3;  Surgeon: Michelle Pineda Jr., MD;  Location: [facility];  Service: Pain Management;  Laterality: Left;    RADIOFREQUENCY THERMOCOAGULATION Right 10/22/2019    Procedure: RADIOFREQUENCY THERMAL COAGULATION---DARSAL RAMUS 5 and LATERAL S1,S2,and S3 Right;  Surgeon: Michelle Pineda Jr., MD;  Location: [facility];  Service: Pain Management;  Laterality: Right;  patient to sign consent DOS    RADIOFREQUENCY THERMOCOAGULATION Left 10/29/2019    Procedure: RADIOFREQUENCY THERMAL COAGULATION - LEFT - DR5, S1,S2, AND S3;  Surgeon: Michelle Pineda Jr., MD;  Location: [facility];  Service: Pain Management;  Laterality: Left;    RADIOFREQUENCY THERMOCOAGULATION Left 5/26/2020    Procedure: RADIOFREQUENCY THERMAL COAGULATION--Left DR5+ lateral branches of S1, S2, S3;  Surgeon: Michelle Pindea Jr., MD;  Location: [facility];  Service: Pain Management;  Laterality: Left;    RADIOFREQUENCY THERMOCOAGULATION Right 6/2/2020    Procedure: RADIOFREQUENCY THERMAL COAGULATION--Right DR5+ lateral branches of S1,  "S2, S3;  Surgeon: Michelle Pineda Jr., MD;  [facility];  Service: Pain Management;  Laterality: Right;    SENTINEL LYMPH NODE BIOPSY Right 5/9/2022    Procedure: BIOPSY, LYMPH NODE, SENTINEL-Right;  Surgeon: NEAL Dhillon MD;  Location: [facility] OR;  Service: General;  Laterality: Right;    Surgery on right knee  07/09/1982    tumor removed from back left side upper shoulder  03/17/2006     Review of patient's allergies indicates:   Allergen Reactions    Etanercept Other (See Comments)     Other reaction(s): recurrent infections    Chloramphenicol sod succinate Hives    Codeine Other (See Comments)     Other reaction(s): Stomach upset. Pt states OK with Percocet    Nickel sutures [surgical stainless steel] Dermatitis     Allergic contact dermatitis    Adhesive Rash      Current Facility-Administered Medications   Medication    ALPRAZolam tablet 0.5 mg    BUPivacaine (PF) 0.25% (2.5 mg/ml) injection    LIDOcaine (PF) 20 mg/mL (2%) injection     Facility-Administered Medications Ordered in Other Encounters   Medication    tropicamide 1% ophthalmic solution 1 drop       PMHx, PSHx, Allergies, Medications reviewed in epic    ROS negative except pain complaints in HPI    OBJECTIVE:    /63   Pulse 76   Temp 98.2 °F (36.8 °C)   Resp 14   Ht 5' 3" (1.6 m)   Wt 45.8 kg (101 lb)   SpO2 99%   Breastfeeding No   BMI 17.89 kg/m²     PHYSICAL EXAMINATION:    GENERAL: Well appearing, in no acute distress, alert and oriented x3.  PSYCH:  Mood and affect appropriate.  SKIN: Skin color, texture, turgor normal, no rashes or lesions which will impact the procedure.  CV: RRR with palpation of the radial artery.  PULM: No evidence of respiratory difficulty, symmetric chest rise. Clear to auscultation.  NEURO: Cranial nerves grossly intact.    Plan:    Proceed with procedure as planned Procedure(s) (LRB):  Block-nerve-medial branch-lumbar bilateral L3, L4, L5 (Bilateral)    Ap " Caitlyn  05/03/2023

## 2023-05-03 NOTE — OP NOTE
Diagnostic Lumbar Medial Branch Block Under Fluoroscopy    The procedure, risks, benefits, and options were discussed with the patient. There are no contraindications to the procedure. The patent expressed understanding and agreed to the procedure. Informed written consent was obtained prior to the start of the procedure and can be found in the patient's chart.    PATIENT NAME: Bhavna Landry   MRN: [id number]     DATE OF PROCEDURE: 05/03/2023                                           PROCEDURE:  Diagnostic Bilateral L3, L4, and L5 Lumbar Medial Branch Block under Fluoroscopy    PRE-OP DIAGNOSIS: Lumbar spondylosis [M47.816] Lumbar spondylosis [M47.816]    POST-OP DIAGNOSIS: Same    PHYSICIAN: Maile Storm DO    ASSISTANTS: Ap Brady D.O. ([facility] Fellow)      MEDICATIONS INJECTED:  Bupivicaine 0.25%    LOCAL ANESTHETIC INJECTED:   Xylocaine 2%    SEDATION: None    ESTIMATED BLOOD LOSS:  None    COMPLICATIONS:  None.    INTERVAL HISTORY: Patient has clinical and imaging findings suggestive of facet mediated pain.    TECHNIQUE: Time-out was performed to identify the patient and procedure to be performed. With the patient laying in a prone position, the surgical area was prepped and draped in the usual sterile fashion using ChloraPrep and fenestrated drape. The levels were determined under fluoroscopic guidance. Skin anesthesia was achieved by injecting Lidocaine 2% over the injection sites. A 22 gauge, 3.5 inch needle was introduced into the medial branch nerves at the junctions of the superior articular process and the transverse processes of the targeted sites using AP, lateral and/or contralateral oblique fluoroscopic imaging. After negative aspiration for blood or CSF was confirmed, 0.5 mL of the anesthetic listed above was then slowly injected at each site. The needles were removed and bleeding was nil. A sterile dressing was applied. No specimens collected. The patient tolerated the procedure  well.     The patient was monitored after the procedure in the recovery area. They were given post-procedure and discharge instructions to follow at home. The patient was discharged in a stable condition.    I reviewed and edited the fellow's note. I conducted my own interview and physical examination. I agree with the findings. I was present and supervising all critical portions of the procedure.    pA Brady, DO

## 2023-05-04 ENCOUNTER — PATIENT MESSAGE (OUTPATIENT)
Dept: RHEUMATOLOGY | Facility: CLINIC | Age: 68
End: 2023-05-04
Payer: MEDICARE

## 2023-05-09 ENCOUNTER — INFUSION (OUTPATIENT)
Dept: INFUSION THERAPY | Facility: HOSPITAL | Age: 68
End: 2023-05-09
Payer: MEDICARE

## 2023-05-09 ENCOUNTER — TELEPHONE (OUTPATIENT)
Dept: OPHTHALMOLOGY | Facility: CLINIC | Age: 68
End: 2023-05-09
Payer: MEDICARE

## 2023-05-09 ENCOUNTER — OFFICE VISIT (OUTPATIENT)
Dept: HEMATOLOGY/ONCOLOGY | Facility: CLINIC | Age: 68
End: 2023-05-09
Payer: MEDICARE

## 2023-05-09 VITALS
WEIGHT: 102.75 LBS | TEMPERATURE: 98 F | HEART RATE: 70 BPM | HEIGHT: 63 IN | RESPIRATION RATE: 16 BRPM | SYSTOLIC BLOOD PRESSURE: 117 MMHG | DIASTOLIC BLOOD PRESSURE: 56 MMHG | BODY MASS INDEX: 18.21 KG/M2 | OXYGEN SATURATION: 100 %

## 2023-05-09 VITALS
TEMPERATURE: 98 F | RESPIRATION RATE: 18 BRPM | HEART RATE: 73 BPM | SYSTOLIC BLOOD PRESSURE: 130 MMHG | DIASTOLIC BLOOD PRESSURE: 59 MMHG

## 2023-05-09 DIAGNOSIS — I70.0 ATHEROSCLEROSIS OF AORTA: ICD-10-CM

## 2023-05-09 DIAGNOSIS — C50.411 CARCINOMA OF UPPER-OUTER QUADRANT OF RIGHT BREAST IN FEMALE, ESTROGEN RECEPTOR POSITIVE: Primary | ICD-10-CM

## 2023-05-09 DIAGNOSIS — I48.0 PAF (PAROXYSMAL ATRIAL FIBRILLATION): ICD-10-CM

## 2023-05-09 DIAGNOSIS — M85.89 OSTEOPENIA OF MULTIPLE SITES: Primary | ICD-10-CM

## 2023-05-09 DIAGNOSIS — D50.0 IRON DEFICIENCY ANEMIA DUE TO CHRONIC BLOOD LOSS: ICD-10-CM

## 2023-05-09 DIAGNOSIS — Z17.0 CARCINOMA OF UPPER-OUTER QUADRANT OF RIGHT BREAST IN FEMALE, ESTROGEN RECEPTOR POSITIVE: Primary | ICD-10-CM

## 2023-05-09 DIAGNOSIS — Z79.811 USE OF ANASTROZOLE: ICD-10-CM

## 2023-05-09 DIAGNOSIS — C77.3 SECONDARY AND UNSPECIFIED MALIGNANT NEOPLASM OF AXILLA AND UPPER LIMB LYMPH NODES: ICD-10-CM

## 2023-05-09 PROCEDURE — 1101F PR PT FALLS ASSESS DOC 0-1 FALLS W/OUT INJ PAST YR: ICD-10-PCS | Mod: CPTII,S$GLB,, | Performed by: INTERNAL MEDICINE

## 2023-05-09 PROCEDURE — 99999 PR PBB SHADOW E&M-EST. PATIENT-LVL V: CPT | Mod: PBBFAC,,, | Performed by: INTERNAL MEDICINE

## 2023-05-09 PROCEDURE — 4010F PR ACE/ARB THEARPY RXD/TAKEN: ICD-10-PCS | Mod: CPTII,S$GLB,, | Performed by: INTERNAL MEDICINE

## 2023-05-09 PROCEDURE — 3078F PR MOST RECENT DIASTOLIC BLOOD PRESSURE < 80 MM HG: ICD-10-PCS | Mod: CPTII,S$GLB,, | Performed by: INTERNAL MEDICINE

## 2023-05-09 PROCEDURE — 3008F BODY MASS INDEX DOCD: CPT | Mod: CPTII,S$GLB,, | Performed by: INTERNAL MEDICINE

## 2023-05-09 PROCEDURE — 3008F PR BODY MASS INDEX (BMI) DOCUMENTED: ICD-10-PCS | Mod: CPTII,S$GLB,, | Performed by: INTERNAL MEDICINE

## 2023-05-09 PROCEDURE — 3074F SYST BP LT 130 MM HG: CPT | Mod: CPTII,S$GLB,, | Performed by: INTERNAL MEDICINE

## 2023-05-09 PROCEDURE — 96365 THER/PROPH/DIAG IV INF INIT: CPT

## 2023-05-09 PROCEDURE — 4010F ACE/ARB THERAPY RXD/TAKEN: CPT | Mod: CPTII,S$GLB,, | Performed by: INTERNAL MEDICINE

## 2023-05-09 PROCEDURE — 1125F AMNT PAIN NOTED PAIN PRSNT: CPT | Mod: CPTII,S$GLB,, | Performed by: INTERNAL MEDICINE

## 2023-05-09 PROCEDURE — 3288F FALL RISK ASSESSMENT DOCD: CPT | Mod: CPTII,S$GLB,, | Performed by: INTERNAL MEDICINE

## 2023-05-09 PROCEDURE — 99999 PR PBB SHADOW E&M-EST. PATIENT-LVL V: ICD-10-PCS | Mod: PBBFAC,,, | Performed by: INTERNAL MEDICINE

## 2023-05-09 PROCEDURE — 3288F PR FALLS RISK ASSESSMENT DOCUMENTED: ICD-10-PCS | Mod: CPTII,S$GLB,, | Performed by: INTERNAL MEDICINE

## 2023-05-09 PROCEDURE — 1125F PR PAIN SEVERITY QUANTIFIED, PAIN PRESENT: ICD-10-PCS | Mod: CPTII,S$GLB,, | Performed by: INTERNAL MEDICINE

## 2023-05-09 PROCEDURE — 25000003 PHARM REV CODE 250: Performed by: INTERNAL MEDICINE

## 2023-05-09 PROCEDURE — 3074F PR MOST RECENT SYSTOLIC BLOOD PRESSURE < 130 MM HG: ICD-10-PCS | Mod: CPTII,S$GLB,, | Performed by: INTERNAL MEDICINE

## 2023-05-09 PROCEDURE — 63600175 PHARM REV CODE 636 W HCPCS: Performed by: INTERNAL MEDICINE

## 2023-05-09 PROCEDURE — 1101F PT FALLS ASSESS-DOCD LE1/YR: CPT | Mod: CPTII,S$GLB,, | Performed by: INTERNAL MEDICINE

## 2023-05-09 PROCEDURE — 3078F DIAST BP <80 MM HG: CPT | Mod: CPTII,S$GLB,, | Performed by: INTERNAL MEDICINE

## 2023-05-09 PROCEDURE — 99214 PR OFFICE/OUTPT VISIT, EST, LEVL IV, 30-39 MIN: ICD-10-PCS | Mod: S$GLB,,, | Performed by: INTERNAL MEDICINE

## 2023-05-09 PROCEDURE — 99214 OFFICE O/P EST MOD 30 MIN: CPT | Mod: S$GLB,,, | Performed by: INTERNAL MEDICINE

## 2023-05-09 RX ORDER — HEPARIN 100 UNIT/ML
500 SYRINGE INTRAVENOUS
Status: CANCELLED | OUTPATIENT
Start: 2023-06-06

## 2023-05-09 RX ORDER — SODIUM CHLORIDE 0.9 % (FLUSH) 0.9 %
10 SYRINGE (ML) INJECTION
Status: CANCELLED | OUTPATIENT
Start: 2023-06-06

## 2023-05-09 RX ORDER — ZOLEDRONIC ACID 0.04 MG/ML
4 INJECTION, SOLUTION INTRAVENOUS
Status: CANCELLED | OUTPATIENT
Start: 2023-06-06

## 2023-05-09 RX ORDER — SODIUM CHLORIDE 0.9 % (FLUSH) 0.9 %
10 SYRINGE (ML) INJECTION
Status: DISCONTINUED | OUTPATIENT
Start: 2023-05-09 | End: 2023-05-09 | Stop reason: HOSPADM

## 2023-05-09 RX ADMIN — SODIUM CHLORIDE: 9 INJECTION, SOLUTION INTRAVENOUS at 10:05

## 2023-05-09 RX ADMIN — ZOLEDRONIC ACID 3.3 MG: 4 INJECTION INTRAVENOUS at 11:05

## 2023-05-09 NOTE — TELEPHONE ENCOUNTER
----- Message from Karen Song MD sent at 5/2/2023 12:09 PM CDT -----  Will call pt next wk to ensure doing well s/p yag cap    F/up optom REE

## 2023-05-09 NOTE — PLAN OF CARE
1207  Infusion completed, pt tolerated; pt instructed to increase water hydration daily and next 3 days r/t Zometa effects; discussed when to contact MD, when to report to ER; next appt not yet scheduled, pt verbalized understanding of all discussed

## 2023-05-09 NOTE — NURSING
1002  Pt here for Zometa infusion, no new complaints or concerns at present, reports tolerating prior treatments; discussed treatment plan for today, all questions answered and pt agrees to proceed

## 2023-05-09 NOTE — PROGRESS NOTES
PROGRESS NOTE    Subjective:       Patient ID: Bhavna Landry is a 67 y.o. female.  MRN: [id number]  : 1955    Chief Complaint: 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    pT1b N0 (i+)  grade 2 ER + AR - HER2 - IDC of the right breast.    History of Present Illness:   Bhavna Landry is a 67 y.o. female who is referred for right breast IDC.     She presented for screening mammogram on 2022 . This identified an asymmetry in the right breast. Follow-up mammogram and ultrasound on 2022 showed a 1 x 0.9 x 0.8 cm mass at the 9 o'clock position of the right breast 3 cm from the nipple. A ultrasound guided biopsy was performed on 2022 with pathology revealing invasive ductal carcinoma of the breast.     She saw Dr. Dhillon and underwent right mastectomy and sentinel node exam.  It showed IDC 8 mm, grade 2, ER strongly positive, AR negative, HER2/jayla 1+, Ki-67 12%.  There was also DCIS, multiple foci up to 8 mm, spanning a distance of 20 mm.  Nuclear grade 2. Two sentinel nodes removed, 1 had isolated tumor cells.    Oncotype DX RS 28; Chemo benefit >15%     She has declined adjuvant chemotherapy.     Started Anastrozole  22    Interim history:   She presents today for regular follow-up.   Had laser surgery to her eyes and that has helped with blurry vision.   Had a nerve block for sciatica on 5/3, relief lasted only a few hours and now has back p ain again. Remains on Tramadol. Still on PT.     Tolerating exemestane fairly well, taking daily. Has noticed night sweats off an on, also does have arthralgias.   Saw Trixie ANDERSON for a palpable lump 3 weeks ago, CBE likely benign, she will have a mammogram at the end of the month.     Has regained some of the lost weight.     She was recently diagnosed with TONIE and B12 def. Started on oral iron. Had a colonoscopy in Dec,(sessile polyps)  had EGD in February that showed gastritis.  "      Oncology History:  1. Breast, right, mastectomy:   - Invasive ductal carcinoma       - Greatest dimension:  8 mm       - Jonathan-Alaniz modified SBR score 3 + 2 + 1 = 6 of 9       - Histologic grade 2 of 3       - Mitotic index = 0.2 (average mitoses per high power field) (low)       - Resection margin free of invasive carcinoma           - Invasive carcinoma present 9 mm to closest posterior margin, 26 mm   to inferior margin, and 45 mm to superior margin   - Biopsy clip present   - No lymphovascular invasion is identified   - Ductal carcinoma in situ       - Foci up to 8 mm in greatest dimension spanning a distance of   approximately 20 mm       - Nuclear grade ranges from 2 to 3 of 3       - Solid and cribriform patterns       - Central comedonecrosis present:  Approximately 80%       - Resection margin free of DCIS           - DCIS present present 9 mm to closest posterior margin, 26 mm to   inferior margin, and 45 mm to superior margin   - Background breast contains usual ductal hyperplasia, apocrine metaplasia,   microcysts and macrocysts, ectatic ducts, and stromal fibrosis   - Microcalcifications, calcium phosphate type, associated with DCIS and   focally with medial calcific arterial stenosis (Monckeberg's sclerosis)   - See CAP Tumor Synoptic   2. [place] lymph node, right axillary, "#1 hot and blue count 2004,"   excision:   - 1 lymph node, isolated tumor cells present   3. [place] lymph node, right axillary, "#2 hot and blue count 1700,"   excision:   - 1 lymph node, negative for metastatic carcinoma   CAP Tumor Synoptic:   Procedure:  Total mastectomy   Specimen laterality:  Right   Tumor site:  9 o'clock   Histologic type:  Invasive carcinoma of no special type (ductal)   Histologic grade (Shelly histologic score):  Homeland score 3 + 2 + 1 =   6 of 9   Glandular (acinar) / tubular differentiation:  Score 3   Nuclear pleomorphism:  Score 2   Mitotic grade:  Score 1   Overall grade:  Grade 2 "   Tumor size:  8 mm   Tumor focality:  Single focus of invasive carcinoma   Ductal carcinoma in situ (DCIS):  Present (Negative for extensive intraductal   component)       Size (extent) of DCIS:  Foci up to 8 mm in greatest dimension spanning a   distance of approximately 20 mm       Number blocks with DCIS:  4       Number blocks examined:  13       Architectural patterns:  Comedo, cribriform, solid       Nuclear grade:  Ranges from grade 2 (intermediate) to grade 3 (high)   Lobular carcinoma in situ (LCIS):  Not identified   Tumor extent:  Not applicable   Lymphovascular invasion:  Not identified   Dermal lymphovascular invasion:  Not identified   Microcalcifications:  Present in DCIS and nonneoplastic tissue   Treatment effect in the breast:  No known pre-surgical therapy   Treatment effect in the lymph nodes:  Not applicable   Margin status for invasive carcinoma:  All margins negative for invasive   carcinoma       Distance from invasive carcinoma closest posterior margin:  9 mm       Distance from invasive carcinoma to inferior margin:  26 mm       Distance from invasive carcinoma to superior margin:  45 mm   Margin status for DCIS:  All margins negative for DCIS       Distant from DCIS to closest posterior margin:  9 mm       Distance from DCIS to inferior margin:  26 mm       Distance from DCIS to superior margin:  45 mm   Regional lymph node status:  Tumor present in regional lymph nodes   Number of lymph nodes with macrometastases:  0   Number of lymph nodes with micrometastases:  0   Number of lymph nodes with isolated tumor cells:  1   Size of largest jose l metastatic deposit:  0.13 mm   Extranodal extension:  Not identified   Total number of lymph nodes examined (sentinel and nonsentinel):  2   Number of sentinel nodes examined:  2   Distant sites involved:  Not applicable   TNM descriptors:  Not applicable   pT category:  pT1b   Regional lymph nodes modifier:  (sn)   pN category:  (sn) pN0 (i+)   pM  category:  Not applicable - pM cannot be determined from the submitted   specimens   Special studies:  Biomarkers, assessed by immunohistochemistry on prior right   breast biopsy (S-) with following reported results:       - Estrogen Receptor (ER):  Positive (100%; strong intensity)       - Progesterone Receptor (OK):  Negative (<1%)       - HER2:  Negative (1+)       - Ki-67 proliferation index:  12% (low)       Oncology History   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, estrogen receptor positive   4/13/2022 Initial Diagnosis    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, estrogen receptor positive       4/13/2022 Cancer Staged    Staging form: Breast, AJCC 8th Edition  - Clinical stage from 4/13/2022: Stage IA (cT1b, cN0, cM0, G2, ER+, OK-, HER2-)       7/19/2022 - 7/19/2022 Chemotherapy    Treatment Summary   Plan Name: OP BREAST TC - DOCETAXEL CYCLOPHOSPHAMIDE Q3W  Treatment Goal: Curative  Status: Inactive  Start Date:   End Date:   Provider: Idania Martins MD  Chemotherapy: cyclophosphamide 600 mg/m2 = 880 mg in sodium chloride 0.9% 250 mL chemo infusion, 600 mg/m2, Intravenous, Clinic/HOD 1 time, 0 of 4 cycles  DOCEtaxeL (TAXOTERE) 75 mg/m2 = 110 mg in sodium chloride 0.9% 250 mL chemo infusion, 75 mg/m2, Intravenous, Clinic/HOD 1 time, 0 of 4 cycles           History:  Past Medical History:   Diagnosis Date    Allergy     Amblyopia     Anemia     Anticoagulant long-term use     Arthritis 02/02/1992    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, estrogen receptor positive 04/13/2022    Cataract     Depression     Dry eyes     Dry mouth     Duane's syndrome of right eye     Early dry stage nonexudative age-related macular degeneration of both eyes 09/20/2022    Fibromyalgia 04/17/2014    Fibromyalgia     Fractured hip     RIGHT HIP    GERD (gastroesophageal reflux disease)     History of psychiatric hospitalization     Hyperlipidemia 02/02/1992     Hypertension     Hypocalcemia     Hyponatremia     Kidney stone     Migraine headache     Osteoporosis     Pressure ulcer of unspecified site, unspecified stage     Psoriatic arthritis 1992    Right knee pain     post knee replacement surgery (possible rejectiion of metal)    RLS (restless legs syndrome)     Schizophrenia 1992    stable on meds    Sciatica     Squamous cell carcinoma of skin     Urinary tract infection       Past Surgical History:   Procedure Laterality Date    brow ptosis repair Right 2019    Surgeon: Dr. Karla Henson    CATARACT EXTRACTION       SECTION      COLONOSCOPY N/A 2016    Procedure: COLONOSCOPY;  Surgeon: ANDRIA Connelly MD;  [address]  Service: Endoscopy;  Laterality: N/A;    COLONOSCOPY N/A 2022    Procedure: COLONOSCOPY;  Surgeon: Mikey Waltres MD;  Location: [facility];  Service: Endoscopy;  Laterality: N/A;    cyst removed from right sinus  1982    ESOPHAGOGASTRODUODENOSCOPY N/A 2023    Procedure: EGD (ESOPHAGOGASTRODUODENOSCOPY);  Surgeon: Dougie Shea MD;  Location: [facility];  Service: Endoscopy;  Laterality: N/A;    HYSTERECTOMY      VAGINAL HYSTERECTOMY WITHOUT BSO - ENDOMETRIOSIS    INJECTION FOR SENTINEL NODE IDENTIFICATION Right 2022    Procedure: INJECTION, FOR SENTINEL NODE IDENTIFICATION-Right;  Surgeon: NEAL Dhillon MD;  Location: [facility];  Service: General;  Laterality: Right;    INJECTION OF ANESTHETIC AGENT AROUND MEDIAL BRANCH NERVES INNERVATING LUMBAR FACET JOINT N/A 2019    Procedure: Lumbo-sacral Block, DR5 and Lateral Branches of S1,S2, S3;  Surgeon: Michelle Pineda Jr., MD;  Location: [facility]IN [facility];  Service: Pain Management;  Laterality: N/A;  Pt takes  and states she holds ASA on her own whenever she has procedures.  Instructed to hold x 3 days prior to procedure.      INJECTION OF ANESTHETIC AGENT AROUND MEDIAL BRANCH NERVES INNERVATING LUMBAR FACET JOINT Bilateral  5/3/2023    Procedure: Block-nerve-medial branch-lumbar bilateral L3, L4, L5;  Surgeon: Maile Storm DO;  Location: [facility];  Service: Pain Management;  Laterality: Bilateral;  asa    INJECTION OF ANESTHETIC AGENT INTO SACROILIAC JOINT Right 8/30/2018    Procedure: BLOCK, SACROILIAC JOINT-Right- ORAL SEDATION;  Surgeon: Michelle Pineda Jr., MD;  Location: [facility];  Service: Pain Management;  Laterality: Right;    INJECTION OF ANESTHETIC AGENT INTO SACROILIAC JOINT Bilateral 9/27/2018    Procedure: BLOCK, SACROILIAC JOINT-BILATERAL;  Surgeon: Michelle Pineda Jr., MD;  Location: [facility];  Service: Pain Management;  Laterality: Bilateral;  Please keep at 10:00 due to trasnsportation    INJECTION OF ANESTHETIC AGENT INTO SACROILIAC JOINT Bilateral 2/7/2019    Procedure: Bilateral Sacroiliac Joint Injection - Per Dr Pineda, not necessary to hold ASA.;  Surgeon: Michelle Pineda Jr., MD;  Location: [facility];  Service: Pain Management;  Laterality: Bilateral;    INTRAOCULAR PROSTHESES INSERTION Right 8/1/2019    Procedure: INSERTION, IOL PROSTHESIS;  Surgeon: Karen Song MD;  Location: [address];  Service: Ophthalmology;  Laterality: Right;    INTRAOCULAR PROSTHESES INSERTION Left 9/26/2019    Procedure: INSERTION, IOL PROSTHESIS;  Surgeon: Karen Song MD;  Location: [address];  Service: Ophthalmology;  Laterality: Left;    JOINT REPLACEMENT Right     knee    KNEE SURGERY      MASTECTOMY Right 5/9/2022    Procedure: MASTECTOMY-Right;  Surgeon: NEAL Dhillon MD;  Location: [facility];  Service: General;  Laterality: Right;  2.5 HOURS    PHACOEMULSIFICATION OF CATARACT Right 8/1/2019    Procedure: PHACOEMULSIFICATION, CATARACT;  Surgeon: Karen Song MD;  Location: [address];  Service: Ophthalmology;  Laterality: Right;    PHACOEMULSIFICATION OF CATARACT Left 9/26/2019    Procedure: PHACOEMULSIFICATION, CATARACT;  Surgeon: Karen Song MD;  Location:  [facility] OR 1ST FLR;  Service: Ophthalmology;  Laterality: Left;    RADIOFREQUENCY THERMOCOAGULATION Right 5/7/2019    Procedure: RADIOFREQUENCY THERMAL COAGULATION RIGHT DORSAL RAMUS 5 AND LATERAL BRANCH OF S1, S2 AND S3;  Surgeon: Michelle Pineda Jr., MD;  Location: [facility];  Service: Pain Management;  Laterality: Right;    RADIOFREQUENCY THERMOCOAGULATION Left 5/14/2019    Procedure: RADIOFREQUENCY THERMAL COAGULATION LEFT DORSAL RAMUS 5 AND LATERAL BRANCH OF S1,S2 AND S3;  Surgeon: Michelle Pineda Jr., MD;  Location: [facility];  Service: Pain Management;  Laterality: Left;    RADIOFREQUENCY THERMOCOAGULATION Right 10/22/2019    Procedure: RADIOFREQUENCY THERMAL COAGULATION---DARSAL RAMUS 5 and LATERAL S1,S2,and S3 Right;  Surgeon: Michelle Pineda Jr., MD;  Location: [facility];  Service: Pain Management;  Laterality: Right;  patient to sign consent DOS    RADIOFREQUENCY THERMOCOAGULATION Left 10/29/2019    Procedure: RADIOFREQUENCY THERMAL COAGULATION - LEFT - DR5, S1,S2, AND S3;  Surgeon: Michelle Pineda Jr., MD;  Location: [facility];  Service: Pain Management;  Laterality: Left;    RADIOFREQUENCY THERMOCOAGULATION Left 5/26/2020    Procedure: RADIOFREQUENCY THERMAL COAGULATION--Left DR5+ lateral branches of S1, S2, S3;  Surgeon: Michelle Pineda Jr., MD;  Location: [facility];  Service: Pain Management;  Laterality: Left;    RADIOFREQUENCY THERMOCOAGULATION Right 6/2/2020    Procedure: RADIOFREQUENCY THERMAL COAGULATION--Right DR5+ lateral branches of S1, S2, S3;  Surgeon: Michelle Pineda Jr., MD;  Location: [facility];  Service: Pain Management;  Laterality: Right;    SENTINEL LYMPH NODE BIOPSY Right 5/9/2022    Procedure: BIOPSY, LYMPH NODE, SENTINEL-Right;  Surgeon: NEAL Dhillon MD;  Location: [facility];  Service: General;  Laterality: Right;    Surgery on right knee  07/09/1982    tumor removed from back left side upper shoulder  03/17/2006     Family History   Problem  Relation Age of Onset    Colon cancer Mother     Psoriasis Mother     Cancer Mother         colon    Depression Mother     Cancer Father         lymphoma    Stroke Sister     Diabetes Brother     Arthritis Brother     Heart disease Brother         cad    No Known Problems Daughter     Hypertension Neg Hx     Suicide Neg Hx     Amblyopia Neg Hx     Blindness Neg Hx     Cataracts Neg Hx     Glaucoma Neg Hx     Macular degeneration Neg Hx     Retinal detachment Neg Hx     Strabismus Neg Hx       Social History     Tobacco Use    Smoking status: Every Day     Packs/day: 0.25     Years: 10.00     Pack years: 2.50     Types: Cigarettes     Last attempt to quit: 1/10/2023     Years since quittin.3     Passive exposure: Never    Smokeless tobacco: Former    Tobacco comments:     about 5 cig a day   Substance and Sexual Activity    Alcohol use: No    Drug use: No    Sexual activity: Not Currently     Partners: Male     Birth control/protection: Post-menopausal        ROS:   Review of Systems   Constitutional:  Negative for fever, malaise/fatigue and weight loss.   HENT:  Negative for congestion, ear pain, nosebleeds and sore throat.    Eyes:  Negative for blurred vision, double vision and photophobia.   Respiratory:  Negative for cough, hemoptysis, sputum production, shortness of breath, wheezing and stridor.    Cardiovascular:  Negative for chest pain, palpitations, orthopnea and leg swelling.   Gastrointestinal:  Negative for abdominal pain, blood in stool, constipation, diarrhea, heartburn, melena, nausea and vomiting.   Genitourinary:  Negative for dysuria and hematuria.   Musculoskeletal:  Positive for back pain and joint pain (left hip with recent exacerbation). Negative for myalgias and neck pain.   Skin:  Negative for itching and rash.   Neurological:  Negative for dizziness, focal weakness, seizures, weakness and headaches.   Endo/Heme/Allergies:  Negative for polydipsia. Does not bruise/bleed easily.  "  Psychiatric/Behavioral:  Positive for depression. Negative for memory loss. The patient is nervous/anxious. The patient does not have insomnia.       Objective:     Vitals:    05/09/23 0825   BP: (!) 117/56   Pulse: 70   Resp: 16   Temp: 97.8 °F (36.6 °C)   TempSrc: Oral   SpO2: 100%   Weight: 46.6 kg (102 lb 11.8 oz)   Height: 5' 3" (1.6 m)   PainSc:   7   PainLoc: Hip           Physical Examination:   Physical Exam  Vitals and nursing note reviewed.   Constitutional:       General: She is not in acute distress.     Appearance: She is not diaphoretic.   HENT:      Head: Normocephalic.      Mouth/Throat:      Pharynx: No oropharyngeal exudate.   Eyes:      General: No scleral icterus.     Conjunctiva/sclera: Conjunctivae normal.   Neck:      Thyroid: No thyromegaly.   Cardiovascular:      Rate and Rhythm: Normal rate and regular rhythm.      Heart sounds: Normal heart sounds. No murmur heard.  Pulmonary:      Effort: Pulmonary effort is normal. No respiratory distress.      Breath sounds: No stridor. No wheezing or rhonchi.   Abdominal:      General: Bowel sounds are normal. There is no distension.      Palpations: Abdomen is soft. There is no mass.      Tenderness: There is no abdominal tenderness. There is no rebound.   Musculoskeletal:         General: No deformity. Normal range of motion.      Cervical back: Neck supple.   Lymphadenopathy:      Cervical: No cervical adenopathy.   Skin:     General: Skin is warm and dry.      Findings: No erythema or rash.   Neurological:      Mental Status: She is alert and oriented to person, place, and time.      Cranial Nerves: No cranial nerve deficit.      Coordination: Coordination normal.      Gait: Gait is intact.   Psychiatric:         Mood and Affect: Affect normal.         Cognition and Memory: Memory normal.         Judgment: Judgment normal.        Diagnostic Tests:  Significant Imaging: I have reviewed and interpreted all pertinent imaging " results/findings.    Laboratory Data:  All pertinent labs have been reviewed.    Labs:   Lab Results   Component Value Date    WBC 13.24 (H) 05/09/2023    HGB 13.2 05/09/2023    HCT 38.9 05/09/2023    MCV 94 05/09/2023     05/09/2023       Assessment/Plan:   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, estrogen receptor positive  pT1b N0 (i+)  grade 2 ER + VA - HER2 - IDC of the right breast.    She is status post mastectomy and sentinel node exam for IDC as well as DCIS of the right breast.     Oncotype returned high risk, which confers a benefit ot the addition of chemotherapy to AI.   RSclin showed an 8% risk of recurrence with 4% absolute benefit of chemotherapy. She has declined chemotherapy.     Tolerating anastrozole relatively well. Continue at this time. See below.   Continue Zometa q 6 months for bone health and possible risk reduction for breast cancer, ok to receive today.     Continue active surveillance. Mammogram due later this month, will see her back in 3 months.     Osteopenia of multiple sites  Diagnosed with osteopenia/osteoporosis given hip fracture in 2020. Follows up with Dr. Cornelius and is on zoledronic acid, now receiving q 6 months.   Continues on MTX weekly  Follow up bone density scan is normal.     Hip pain   Recent exacerbation of left hip pain. Imaging revealed no abnormality of hip, has degenerative changes L3-L5. Declines surgical evaluation at this time. Continue PT, continue to monitor.     Aromatase inhibitor arthralgias   Continue supportive care. She declined acupuncture referral previously.     Colon Polyps  TONIE  Recent colonoscopy 12/22, path did not show any malignancy.   EGD completed 2/23  Continue oral iron supplementation per PMD, improving.     Atrial fibrillation   Atherosclerosis of aorta   On aspirin, follows up with Cardiology         ECOG SCORE    1 - Restricted in strenuous activity-ambulatory and able to carry out work of a light nature            Discussion:         Med Onc Chart Routing      Follow up with physician 3 months and 6 months. 6 month visit on 11/7 with same day labs   Follow up with LATOYA    Infusion scheduling note 11/7   Injection scheduling note    Labs CBC and CMP   Scheduling:  Preferred lab:  Lab interval:     Imaging    Pharmacy appointment    Other referrals

## 2023-05-11 ENCOUNTER — TELEPHONE (OUTPATIENT)
Dept: OPHTHALMOLOGY | Facility: CLINIC | Age: 68
End: 2023-05-11
Payer: MEDICARE

## 2023-05-11 ENCOUNTER — TELEPHONE (OUTPATIENT)
Dept: RHEUMATOLOGY | Facility: CLINIC | Age: 68
End: 2023-05-11
Payer: MEDICARE

## 2023-05-11 NOTE — TELEPHONE ENCOUNTER
----- Message from Elsa Madrid sent at 5/11/2023 11:44 AM CDT -----  Regarding: Patient Advice  Contact: Pt [phone]  Pt is calling  to schedule a sooner appt pt states this is the 3rd time calling and no one is reached back out to her please call
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
## 2023-05-12 ENCOUNTER — CLINICAL SUPPORT (OUTPATIENT)
Dept: REHABILITATION | Facility: HOSPITAL | Age: 68
End: 2023-05-12
Payer: MEDICARE

## 2023-05-12 DIAGNOSIS — M53.86 DECREASED RANGE OF MOTION OF LUMBAR SPINE: ICD-10-CM

## 2023-05-12 DIAGNOSIS — Z74.09 IMPAIRED FUNCTIONAL MOBILITY, BALANCE, GAIT, AND ENDURANCE: ICD-10-CM

## 2023-05-12 DIAGNOSIS — R26.89 DECREASED FUNCTIONAL MOBILITY: Primary | ICD-10-CM

## 2023-05-12 PROCEDURE — 97140 MANUAL THERAPY 1/> REGIONS: CPT | Mod: PN

## 2023-05-12 PROCEDURE — 97110 THERAPEUTIC EXERCISES: CPT | Mod: PN

## 2023-05-12 NOTE — PROGRESS NOTES
[facility]   Physical Therapy Treatment Note     Name: Bhavna Landry  [facility] Number: [id number]    Therapy Diagnosis:   Encounter Diagnoses   Name Primary?    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     Precautions: Standard, asthma, smoker, osteoporosis    PTA Visit #: 3/5     Time In: 8:00 am  Time Out: 9:00 am  Total Billable Time: 30 minutes - 1:1 physical therapist       SUBJECTIVE     Pt reports: got an SOPHY injection on 5/3 that worked for a few hours, but pain returned 8 hours later at a higher intensity than before. Insurance would like her to get another injection before considering nerve ablation. Pain located in left lumbosacral region that radiates sharply into left buttocks and radiates towards the front of her hip. Burning present in anterior left thigh. Has not been here in two weeks cause she had an eye procedure and the SOPHY. While she was out, she attempted some exercises but couldn't do them cause she was in so much pain.    She was compliant with home exercise program.  Response to previous treatment: pain relief   Functional change: No longer having radiating pain down lower extremity     Pain: 8/10  Location: left lumbosacral region radiating into left buttocks and anterior hip    OBJECTIVE     Objective Measures updated at progress report unless specified.        AROM:     "Degrees Pain/Dysfunction   Flexion finger tip to floor P! from flexion < > extension  Emerita's Sign: +   Extension 25% sharp ! in L buttocks   Right Side Bending  25% ! left lumbosacral region   Left Side Bending  25% ! left lumbosacral region   Right Rotation 50% Tight pain in lumbosacral and left buttocks   Left Rotation 35% Tight pain in lumbosacral and left buttocks      Hip AROM:    RLE LLE   Internal Rotation 25 -- 32 30 -- 35 (! in L PSIS)   External Rotation 27 -- 32 26 -- 32          Strength:  RLE   LLE     Hip flexion: 4/5 Hip flexion: 4-/5   Hip Abduction: 4-/5 Hip abduction: 4-/5   Hip extension 4-/5 Hip extension 4-/5   Knee flexion: 4-/5 Knee flexion: 4-/5   Knee extension: 4/5 Knee extension: 4/5   Ankle Dorsiflexion: 4+/5 Ankle Dorsiflexion: 4+/5   Ankle Plantarflexion: 4+/5 Ankle Plantarflexion: 4+/5      Special tests:   SLR: (-) R and (-) left     SIJ Dysfunction Cluster:   Gaenslen's Test: NT  Thigh Thrust: +  SIJ Distraction: +  SIJ Compression: +  Sacral Thrust: - (slight relief)    Joint Mobility:   L3-L5  painful     Treatment     Jessica received the treatments listed below:      therapeutic exercises to develop strength, endurance, ROM, flexibility, posture, and core stabilization for 45 minutes 1:1 physical therapist including:      Prone:   Hip extension: 2x10 bilateral  Hip extension with knee flexed to 90 degrees: 2x10 bilateral    Propped hook-lying on red wedge:  LTRs: 20x   Glute squeezes: 10x10"  Bridges: 3x10 propped  Double knee to chest with peanut: 25x   Clamshells: red theraband - 2x10 bilateral  Side-lying hip abduction: 2x10 bilateral  Transverse abdominus contraction with hip adduction (yellow ball): 20x5" holds   Transverse abdominus contraction with hip abduction (blue belt): 20x5" holds   Swiss ball roll outs: 20x       manual therapy techniques: Joint mobilizations and Soft tissue Mobilization were applied for 15 minutes 1:1 physical therapist   , including:  Left PA " hip mobilizations - Grade II-III (improved pain levels)    Patient Education and Home Exercises     Home Exercises Provided and Patient Education Provided     Education provided:   - home exercise program review  - add sink distraction stretch    Written Home Exercises Provided: Patient instructed to cont prior HEP. Exercises were reviewed and Jessica was able to demonstrate them prior to the end of the session.  Jessica demonstrated good  understanding of the education provided. See EMR under Patient Instructions for exercises provided during therapy sessions    ASSESSMENT   Bhavna is a 67 y.o. 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 Patient has not been to therapy for 2 weeks secondary to SOPHY and eye procedure performed. Patient non-compliant at this time with home exercise program. Radicular symptoms have centralized with no longer having positive neural tension testing. Biggest complaint remains left sided lumbosacral and hip pain. Remains significantly weak in gluteus complex and core. Believes lack of progress made in therapy secondary to arthritic changes in bilateral hips and SIJ and lack of compliance with HEP. Will progress bilateral hip extension mobility, glute complex strengthening, and core stability training for 2x/week for 4 more weeks.    Jessica Is progressing well towards her goals.   Pt prognosis is Good.     Pt will continue to benefit from skilled outpatient physical therapy to address the deficits listed in the problem list box on initial evaluation, provide pt/family education and to maximize pt's level of independence in the home and community environment.     Pt's spiritual, cultural and educational needs considered and pt agreeable to plan of care and goals.     Anticipated barriers to physical therapy:  co-morbidities    Goals:   Short Term Goals (4 Weeks):  1. Patient will be compliant with home exercise program to supplement therapy in promoting functional mobility.  2. Patient will perform transverse abdominus contraction with good control to demonstrate improved core strength.  3. Patient will report no pain during thoracolumbar active range of motion to promote functional mobility.  4. Patient will improve impaired lower extremity myotomes/manual muscle tests  to >/=4/5 to improve strength for functional tasks.  5. Patient will reports </= 3/10 pain at rest to improve quality of life.     Long Term Goals (8 Weeks):   1. Patient will improve FOTO score to </= 50% limited to decrease perceived limitation with maintaining/changing body position.   2. Patient will improve bilateral hip external/internal rotation range of motion to 30 degrees to promote functional mobility.  3. Patient will improve impaired lower extremity myotomes/manual muscle tests to >/=4+/5 to improve strength for functional tasks.  4. Patient will report no pain while walking 1 mile to return to daily walks.     PLAN   Left hip PA mobilizations - grade II-III\  Hip extension mobility   Closed chain gluteus strengthening  Core stability training     Stephanie Castellanos, PT

## 2023-05-15 ENCOUNTER — TELEPHONE (OUTPATIENT)
Dept: OPHTHALMOLOGY | Facility: CLINIC | Age: 68
End: 2023-05-15
Payer: MEDICARE
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
## 2023-05-17 DIAGNOSIS — Z85.3 PERSONAL HISTORY OF BREAST CANCER: Primary | ICD-10-CM

## 2023-05-17 DIAGNOSIS — Z90.11 S/P MASTECTOMY, RIGHT: ICD-10-CM

## 2023-05-17 DIAGNOSIS — N63.21 MASS OF UPPER OUTER QUADRANT OF LEFT BREAST: ICD-10-CM

## 2023-05-17 NOTE — PLAN OF CARE
[facility]  Physical Therapy Plan of Care Note     Name: Bhavna Landry  Clinic Number: [id number]    Therapy Diagnosis:   Encounter Diagnoses   Name Primary?    Decreased functional mobility Yes    Impaired functional mobility, balance, gait, and endurance     Decreased range of motion of lumbar spine      Physician: No ref. 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 Insurance would like her to get another injection before considering nerve ablation. Pain located in left lumbosacral region that radiates sharply into left buttocks and radiates towards the front of her hip. Burning present in anterior left thigh. Has not been here in two weeks cause she had an eye procedure and the SOPHY. While she was out, she attempted some exercises but couldn't do them cause she was in so much pain.    OBJECTIVE     Update:   AROM:    Degrees Pain/Dysfunction   Flexion finger tip to floor P! from flexion < > extension  Emerita's Sign: +   Extension 25% sharp ! in L buttocks   Right Side Bending  25% ! left lumbosacral region   Left Side Bending  25% ! left lumbosacral region   Right Rotation 50% Tight pain in lumbosacral and left buttocks   Left Rotation 35% Tight pain in lumbosacral and left buttocks      Hip AROM:    RLE LLE   Internal Rotation 25 -- 32 30 -- 35 (! in L PSIS)   External Rotation  27 -- 32 26 -- 32          Strength:  RLE   LLE     Hip flexion: 4/5 Hip flexion: 4-/5   Hip Abduction: 4-/5 Hip abduction: 4-/5   Hip extension 4-/5 Hip extension 4-/5   Knee flexion: 4-/5 Knee flexion: 4-/5   Knee extension: 4/5 Knee extension: 4/5   Ankle Dorsiflexion: 4+/5 Ankle Dorsiflexion: 4+/5   Ankle Plantarflexion: 4+/5 Ankle Plantarflexion: 4+/5      Special tests:   SLR: (-) R and (-) left      SIJ Dysfunction Cluster:   Gaenslen's Test: NT  Thigh Thrust: +  SIJ Distraction: +  SIJ Compression: +  Sacral Thrust: - (slight relief)     Joint Mobility:   L3-L5  painful        ASSESSMENT     Update:   Bhavna is a 67 y.o. 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 Patient has not been to therapy for 2 weeks secondary to SOPHY and eye procedure performed. Patient non-compliant at this time with home exercise program. Radicular symptoms have centralized with no longer having positive neural tension testing. Biggest complaint remains left sided lumbosacral and hip pain. Remains significantly weak in gluteus complex and core. Believes lack of progress made in therapy secondary to arthritic changes in bilateral hips and SIJ and lack of compliance with HEP. Will progress bilateral hip extension mobility, glute complex strengthening, and core stability training for 2x/week for 4 more weeks.    Previous Short Term Goals Status:  Continue per initial plan of care   New Short Term Goals Status:   continue per initial plan of care   Long Term Goal Status: continue per initial plan of care.  Reasons for Recertification of Therapy:   see assessment     GOALS  Short Term Goals (4 Weeks):  1. Patient will be compliant with home exercise program to supplement therapy in promoting functional mobility.  2. Patient will perform transverse abdominus  contraction with good control to demonstrate improved core strength.  3. Patient will report no pain during thoracolumbar active range of motion to promote functional mobility.  4. Patient will improve impaired lower extremity myotomes/manual muscle tests  to >/=4/5 to improve strength for functional tasks.  5. Patient will reports </= 3/10 pain at rest to improve quality of life.     Long Term Goals (8 Weeks):   1. Patient will improve FOTO score to </= 50% limited to decrease perceived limitation with maintaining/changing body position.   2. Patient will improve bilateral hip external/internal rotation range of motion to 30 degrees to promote functional mobility.  3. Patient will improve impaired lower extremity myotomes/manual muscle tests to >/=4+/5 to improve strength for functional tasks.  4. Patient will report no pain while walking 1 mile to return to daily walks.    PLAN     Updated Certification Period: 5/12/2023 to 6/9/2023   Recommended Treatment Plan: 2 times per week for 4 weeks:  Cervical/Lumbar Traction, Moist Heat/ Ice, Neuromuscular Re-ed, Patient Education, Self Care, Therapeutic Activities, and Therapeutic Exercise  Other Recommendations: see assessment     Stephanie Castellanos, PT

## 2023-05-18 ENCOUNTER — CLINICAL SUPPORT (OUTPATIENT)
Dept: REHABILITATION | Facility: HOSPITAL | Age: 68
End: 2023-05-18
Payer: MEDICARE

## 2023-05-18 DIAGNOSIS — R26.89 DECREASED FUNCTIONAL MOBILITY: Primary | ICD-10-CM

## 2023-05-18 DIAGNOSIS — M53.86 DECREASED RANGE OF MOTION OF LUMBAR SPINE: ICD-10-CM

## 2023-05-18 DIAGNOSIS — Z74.09 IMPAIRED FUNCTIONAL MOBILITY, BALANCE, GAIT, AND ENDURANCE: ICD-10-CM

## 2023-05-18 PROCEDURE — 97140 MANUAL THERAPY 1/> REGIONS: CPT | Mod: PN

## 2023-05-18 PROCEDURE — 97110 THERAPEUTIC EXERCISES: CPT | Mod: PN

## 2023-05-18 NOTE — PROGRESS NOTES
[facility]   Physical Therapy Treatment Note     Name: Bhavna Landry  [facility] Number: [id number]    Therapy Diagnosis:   Encounter Diagnoses   Name Primary?    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     Precautions: Standard, asthma, smoker, osteoporosis    PTA Visit #: 0/5     Time In: 8:00 am  Time Out: 9:00 am  Total Billable Time: 60 minutes - 1 MT, 3 TE-- 1:1 physical therapist       SUBJECTIVE     Pt reports: has been having this pain for years. Gets pain behind left thigh, originates along low back and/or buttock.  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 Total knee replacement revision perform.   She was compliant with home exercise program.  Response to previous treatment: pain relief   Functional change: No longer having radiating pain down lower extremity     Pain: 5/10   After: 4/10  Location: left lumbosacral region radiating into left buttocks and anterior hip    OBJECTIVE     Objective Measures updated at progress report unless specified.      See plan of care update from Stephanie Castellanos PT for updated measurements.       5/18/23: 7 mins  (+) SLR pain in buttocks (L)     (+) Ely's test (tight hip flexors)    Joint Mobility:   L3-L5  painful     Foot assessment:  Right foot: Hallux valgus  Left foot: Good medial longitudinal arch at  "baseline.  Marching: good mobility in right foot, less in left  Trunk rotation: Increased mobility in right foot, less in left    Navicular drop test:    L foot: 4.6 cm at rest, 5.2 cm with subtalar neutral = 0.6cm or 6 mm= neutral foot  R foot: 4.6 cm at rest, 5.5cm with subtalar neutral= 0.9cm or 9mm = neutral, almost pronated foot        Treatment     Jessica received the treatments listed below:      therapeutic exercises to develop strength, endurance, ROM, flexibility, posture, and core stabilization for 45 minutes 1:1 physical therapist including:      Bold= progressed today  (+)= added today    SciFit to promote lower extremity mobility and cardiovascular endurance: 10 mins,  L5    Prone:   Hip extension: 10 reps, 5'' hold (VC to maintain knee extension, cramping L hamstring)     Propped hook-lying on red wedge:  +Hip abduction x 30'' hold x 10 reps each  Bridges with gait belt hip abduction: 10 reps, 10'' hold  Double knee to chest: 30'' x 3 trials   Piriformis stretch x 30'' hold x 3 trials    Not today:  Clamshells: red theraband - 2x10 bilateral  Side-lying hip abduction: 2x10 bilateral  Transverse abdominus contraction with hip adduction (yellow ball): 20x5" holds   Swiss ball roll outs: 20x   LTRs: 20x       manual therapy techniques: Joint mobilizations and Soft tissue Mobilization were applied for 8 minutes 1:1 physical therapist   , including:  Muscle energy technique to promote left piriformis activation    Patient Education and Home Exercises     Home Exercises Provided and Patient Education Provided     Education provided:   - home exercise program review  - add sink distraction stretch    Written Home Exercises Provided: Patient instructed to cont prior HEP. Exercises were reviewed and Jessica was able to demonstrate them prior to the end of the session.  Jessica demonstrated good  understanding of the education provided. See EMR under Patient Instructions for exercises provided during therapy " sessions    ASSESSMENT   Bhavna is a 67 y.o. 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 Pt presents today with moderate pain level which was reduced further by one grade on the numeric pain scale.  Pt demonstrates a positive Ely's test bilaterally with tight hip flexors, painful P-A mobilization of L2, L4-5 today (Grade I, II), hypomobility noted in left piriformis (relaxed further after muscle energy technique) and nearly pronated right foot (neutral at this time).  Pt will benefit from continued therapy that focuses on gluteal activation/strengthening, lumbar mobility due to multi-level arthropathy and neural mobilization and/or further lumbar stability to promote pain relief.      Jessica Is progressing well towards her goals.   Pt prognosis is Good.     Pt will continue to benefit from skilled outpatient physical therapy to address the deficits listed in the problem list box on initial evaluation, provide pt/family education and to maximize pt's level of independence in the home and community environment.     Pt's spiritual, cultural and educational needs considered and pt agreeable to plan of care and goals.     Anticipated barriers to physical therapy: co-morbidities    Goals:   Short Term Goals (4 Weeks):  1. Patient will be compliant with home exercise program to supplement therapy in promoting functional mobility.  2. Patient will perform transverse abdominus contraction with good control to demonstrate improved core strength.  3. Patient will report no pain during thoracolumbar active range of motion to promote functional mobility.  4. Patient will improve impaired lower extremity myotomes/manual muscle tests  to >/=4/5 to improve strength for functional tasks.  5. Patient will reports </= 3/10 pain at rest to improve quality of  life.     Long Term Goals (8 Weeks):   1. Patient will improve FOTO score to </= 50% limited to decrease perceived limitation with maintaining/changing body position.   2. Patient will improve bilateral hip external/internal rotation range of motion to 30 degrees to promote functional mobility.  3. Patient will improve impaired lower extremity myotomes/manual muscle tests to >/=4+/5 to improve strength for functional tasks.  4. Patient will report no pain while walking 1 mile to return to daily walks.     PLAN   Left hip PA mobilizations - grade II-III\  Hip extension mobility   Closed chain gluteus strengthening  Core stability training     Di Wright, PT  , DPT  5/18/2023

## 2023-05-19 ENCOUNTER — TELEPHONE (OUTPATIENT)
Dept: INTERNAL MEDICINE | Facility: CLINIC | Age: 68
End: 2023-05-19
Payer: MEDICARE

## 2023-05-19 RX ORDER — METHOCARBAMOL 500 MG/1
TABLET, FILM COATED ORAL
Qty: 60 TABLET | Refills: 1 | Status: SHIPPED | OUTPATIENT
Start: 2023-05-19 | End: 2023-05-31 | Stop reason: SDUPTHER

## 2023-05-19 NOTE — TELEPHONE ENCOUNTER
----- Message from Familia Trinh MA sent at 5/18/2023  3:38 PM CDT -----  Contact: [phone]    ----- Message -----  From: Rosita Fields  Sent: 5/18/2023   2:42 PM CDT  To: Alicia CASTANEDA Staff    Pt is having pain and cramps in both legs and hands and wants to see about getting some muscle relaxer prescribed.     [id number] [place] [facility] [address]  [address] [address]  Phone: [phone] Fax: [phone]

## 2023-05-19 NOTE — TELEPHONE ENCOUNTER
Called the pt to inform them that Dr. Vargas called in an Rx to the pharmacy. If the medication is not effective then we will schedule her for an appt. I left the pt a VM asking for a call back.

## 2023-05-22 ENCOUNTER — OFFICE VISIT (OUTPATIENT)
Dept: PAIN MEDICINE | Facility: CLINIC | Age: 68
End: 2023-05-22
Payer: MEDICARE

## 2023-05-22 VITALS
DIASTOLIC BLOOD PRESSURE: 70 MMHG | SYSTOLIC BLOOD PRESSURE: 120 MMHG | BODY MASS INDEX: 18.2 KG/M2 | HEART RATE: 76 BPM | WEIGHT: 102.75 LBS

## 2023-05-22 DIAGNOSIS — M47.816 LUMBAR SPONDYLOSIS: ICD-10-CM

## 2023-05-22 DIAGNOSIS — G89.4 CHRONIC PAIN SYNDROME: ICD-10-CM

## 2023-05-22 DIAGNOSIS — M51.36 DDD (DEGENERATIVE DISC DISEASE), LUMBAR: ICD-10-CM

## 2023-05-22 DIAGNOSIS — M48.062 SPINAL STENOSIS OF LUMBAR REGION WITH NEUROGENIC CLAUDICATION: ICD-10-CM

## 2023-05-22 DIAGNOSIS — M54.16 LUMBAR RADICULOPATHY: Primary | ICD-10-CM

## 2023-05-22 PROCEDURE — 1125F AMNT PAIN NOTED PAIN PRSNT: CPT | Mod: CPTII,S$GLB,, | Performed by: NURSE PRACTITIONER

## 2023-05-22 PROCEDURE — 3074F SYST BP LT 130 MM HG: CPT | Mod: CPTII,S$GLB,, | Performed by: NURSE PRACTITIONER

## 2023-05-22 PROCEDURE — 99214 PR OFFICE/OUTPT VISIT, EST, LEVL IV, 30-39 MIN: ICD-10-PCS | Mod: S$GLB,,, | Performed by: NURSE PRACTITIONER

## 2023-05-22 PROCEDURE — 99214 OFFICE O/P EST MOD 30 MIN: CPT | Mod: S$GLB,,, | Performed by: NURSE PRACTITIONER

## 2023-05-22 PROCEDURE — 99999 PR PBB SHADOW E&M-EST. PATIENT-LVL IV: ICD-10-PCS | Mod: PBBFAC,,, | Performed by: NURSE PRACTITIONER

## 2023-05-22 PROCEDURE — 3008F PR BODY MASS INDEX (BMI) DOCUMENTED: ICD-10-PCS | Mod: CPTII,S$GLB,, | Performed by: NURSE PRACTITIONER

## 2023-05-22 PROCEDURE — 1160F RVW MEDS BY RX/DR IN RCRD: CPT | Mod: CPTII,S$GLB,, | Performed by: NURSE PRACTITIONER

## 2023-05-22 PROCEDURE — 3078F PR MOST RECENT DIASTOLIC BLOOD PRESSURE < 80 MM HG: ICD-10-PCS | Mod: CPTII,S$GLB,, | Performed by: NURSE PRACTITIONER

## 2023-05-22 PROCEDURE — 99999 PR PBB SHADOW E&M-EST. PATIENT-LVL IV: CPT | Mod: PBBFAC,,, | Performed by: NURSE PRACTITIONER

## 2023-05-22 PROCEDURE — 3078F DIAST BP <80 MM HG: CPT | Mod: CPTII,S$GLB,, | Performed by: NURSE PRACTITIONER

## 2023-05-22 PROCEDURE — 4010F ACE/ARB THERAPY RXD/TAKEN: CPT | Mod: CPTII,S$GLB,, | Performed by: NURSE PRACTITIONER

## 2023-05-22 PROCEDURE — 3008F BODY MASS INDEX DOCD: CPT | Mod: CPTII,S$GLB,, | Performed by: NURSE PRACTITIONER

## 2023-05-22 PROCEDURE — 1160F PR REVIEW ALL MEDS BY PRESCRIBER/CLIN PHARMACIST DOCUMENTED: ICD-10-PCS | Mod: CPTII,S$GLB,, | Performed by: NURSE PRACTITIONER

## 2023-05-22 PROCEDURE — 1125F PR PAIN SEVERITY QUANTIFIED, PAIN PRESENT: ICD-10-PCS | Mod: CPTII,S$GLB,, | Performed by: NURSE PRACTITIONER

## 2023-05-22 PROCEDURE — 1159F MED LIST DOCD IN RCRD: CPT | Mod: CPTII,S$GLB,, | Performed by: NURSE PRACTITIONER

## 2023-05-22 PROCEDURE — 3074F PR MOST RECENT SYSTOLIC BLOOD PRESSURE < 130 MM HG: ICD-10-PCS | Mod: CPTII,S$GLB,, | Performed by: NURSE PRACTITIONER

## 2023-05-22 PROCEDURE — 4010F PR ACE/ARB THEARPY RXD/TAKEN: ICD-10-PCS | Mod: CPTII,S$GLB,, | Performed by: NURSE PRACTITIONER

## 2023-05-22 PROCEDURE — 1159F PR MEDICATION LIST DOCUMENTED IN MEDICAL RECORD: ICD-10-PCS | Mod: CPTII,S$GLB,, | Performed by: NURSE PRACTITIONER

## 2023-05-22 NOTE — PROGRESS NOTES
" [facility] Established Patient Evaluation    Chief Complaint  Chief Complaint   Patient presents with    Low-back Pain       Last 3 PDI Scores 3/14/2023 9/16/2020 4/24/2019   Pain Disability Index (PDI) 22 56 14     Interval History (05/22/2023):  Bhavna Landry returns today for follow up she is s/p a a diagnostic Lumbar MBB targeting L4/5 and L5/S1 that  provided 0% relief of her low back and leg pain R>L. She would like to consider other injection that may help. She denies and new pain, denies B/B dysfunction, denies any profound weakness.     Current Pain Scales:  Current: 5/10              Typical Range: 5-9/10     Interval History (03/14/2023):  Bhavna Landry returns today for low back pain that radiates into the b/l thighs.  Pain is described as clenching pain that has worsened over last 2 years. No trauma or surgery.  Pain is worse with extension.  Pain is better with heat, lying down, gabapentin, tylenol and tramadol.  She is scheduled to start PT next week.  Currently, the bilateral hip and bilateral leg pain is stable.  Avoid NSAIDs due to history of gastric ulcer.     Current Pain Scales:  Current: 7/10              Typical Range: 7-8/10     Background from Chart Review  9/16/2020- Mrs. Landry presents to clinic today reporting left hip pain for the past 3-4 mos, which has not improved with PT.  Pain starts in the lateral left hip "on the bone" and radiates through the buttock, down the lateral thigh not passing the knee, and into the anterolateral thigh.  She endorses regular stretching and states that this pain is distinctly different from the SI pain for which she received SI RFA in the past.     6/09/2020-   64-year-old female presents status post left then right  DR 5+ lateral branches of the S1, S2 and S3 RFA with reported 100% continued  relief she reports that her pain score today is 0/10.  She reports improvement with her ADLs and overall improvement of her functionality.   "   05/05/2020  presents tele-medicine appointment for a follow-up appointment for low back, left groin and bilateral hip pain.  Since the last visit, Bhavna Landry states the pain has been persistant. Current pain intensity is 9/10.  Patient reports onset of pain 2 weeks, patient states bending forward and sitting for long periods of time increase her pain.  Pain is described as aching.  She reports no radicular symptoms in either leg.  Worst pain is not out of 10.  Patient is status post right and left L5 Dorsal Ramus and Lateral Branches of S1, S2, and S3 (4 levels) RFA reported 80-90% relief for a minimal 6 months.  Pain is now returned and patient like to reschedule procedure.        Treatment History  PT/OT: yes  HEP: yes  TENS:  Injections:   Bilateral SI joint injections, bilateral  Dorsal ramus block(DR5, S1,S2,S3),  bilateral L5 Dorsal Ramus and Lateral Branches of S1, S2, and S3 (4 levels) RFA, GTB injections   Surgery:  Medications:   - NSAIDS: avoid due to gastric ulcer   - MSK Relaxants:   - TCAs:   - SNRIs: Venlafaxine  - Topicals: Voltaren  - Opioids: Tramadol   - Anticonvulsants: Gabapentin    Current Pain Medications  Gabapentin  Tramadol      Full Medication List    Current Outpatient Medications:     albuterol (PROAIR HFA) 90 mcg/actuation inhaler, 2 puffs qid prn, Disp: 54 g, Rfl: 3    amLODIPine (NORVASC) 5 MG tablet, Take 1 tablet (5 mg total) by mouth every evening., Disp: 90 tablet, Rfl: 1    ascorbic acid, vitamin C, (VITAMIN C) 500 MG tablet, Take 500 mg by mouth once daily., Disp: , Rfl:     aspirin (ECOTRIN) 81 MG EC tablet, Take 81 mg by mouth once daily., Disp: , Rfl:     atorvastatin (LIPITOR) 80 MG tablet, Take 1 tablet (80 mg total) by mouth once daily., Disp: 90 tablet, Rfl: 3    benztropine (COGENTIN) 0.5 MG tablet, Take 1 tablet (0.5 mg total) by mouth 2 (two) times daily., Disp: 180 tablet, Rfl: 3    cloNIDine (CATAPRES) 0.1 MG tablet, 1 tab po q day for systolic BP > 160 or DBP  >100., Disp: 30 tablet, Rfl: 1    cyanocobalamin 500 MCG tablet, Take 500 mcg by mouth once daily., Disp: , Rfl:     diclofenac sodium (VOLTAREN) 1 % Gel, Apply 2 g topically once daily., Disp: 200 g, Rfl: 0    exemestane (AROMASIN) 25 mg tablet, Take 1 tablet (25 mg total) by mouth once daily., Disp: 30 tablet, Rfl: 3    ferrous sulfate 325 (65 FE) MG EC tablet, Take 1 tablet (325 mg total) by mouth once daily., Disp: 1 tablet, Rfl: 0    fluticasone (VERAMYST) 27.5 mcg/actuation nasal spray, 2 sprays by Nasal route as needed for Rhinitis., Disp: , Rfl:     folic acid (FOLVITE) 1 MG tablet, Take 1 tablet (1,000 mcg total) by mouth once daily., Disp: 90 tablet, Rfl: 3    gabapentin (NEURONTIN) 100 MG capsule, Take 1 capsule (100 mg total) by mouth 2 (two) times daily as needed (anxiety)., Disp: 180 capsule, Rfl: 3    gabapentin (NEURONTIN) 300 MG capsule, Take 1 capsule (300 mg total) by mouth every evening., Disp: 90 capsule, Rfl: 3    isosorbide mononitrate (IMDUR) 30 MG 24 hr tablet, TAKE 1 TABLET(30 MG) BY MOUTH EVERY DAY, Disp: 30 tablet, Rfl: 11    lisinopriL 10 MG tablet, Take 0.5 tablets (5 mg total) by mouth once daily., Disp: 45 tablet, Rfl: 0    LUTEIN ORAL, Take by mouth., Disp: , Rfl:     methocarbamoL (ROBAXIN) 500 MG Tab, 1-2 tabs po q 12 h prn muscle cramps, Disp: 60 tablet, Rfl: 1    methotrexate 2.5 MG Tab, Take 6 tablets (15 mg total) by mouth every 7 days. Take 3 tabs Mon am and 3 tabs Mon pm, Disp: 72 tablet, Rfl: 0    multivitamin (THERAGRAN) per tablet, Take 1 tablet by mouth once daily., Disp: , Rfl:     omega-3 fatty acids/fish oil (FISH OIL-OMEGA-3 FATTY ACIDS) 300-1,000 mg capsule, Take by mouth once daily., Disp: , Rfl:     omeprazole (PRILOSEC) 20 MG capsule, Take 1 capsule (20 mg total) by mouth once daily., Disp: 30 capsule, Rfl: 2    risperiDONE (RISPERDAL) 2 MG tablet, TAKE 1 TABLET(2 MG) BY MOUTH EVERY MORNING, Disp: 90 tablet, Rfl: 3    risperiDONE (RISPERDAL) 4 MG tablet, Take 1  tablet (4 mg total) by mouth every evening., Disp: 90 tablet, Rfl: 3    traMADoL (ULTRAM) 50 mg tablet, Take 1 tablet (50 mg total) by mouth every 6 (six) hours as needed for Pain., Disp: 30 each, Rfl: 0    venlafaxine (EFFEXOR-XR) 75 MG 24 hr capsule, Take 1 capsule (75 mg total) by mouth once daily., Disp: 90 capsule, Rfl: 3    VITAMIN A ORAL, Take by mouth., Disp: , Rfl:     vitamin D (VITAMIN D3) 1000 units Tab, Take 1,000 Units by mouth once daily., Disp: , Rfl:     vitamin E 200 UNIT capsule, Take 200 Units by mouth once daily., Disp: , Rfl:     zinc gluconate 50 mg tablet, Take 50 mg by mouth once daily., Disp: , Rfl:   No current facility-administered medications for this visit.    Facility-Administered Medications Ordered in Other Visits:     tropicamide 1% ophthalmic solution 1 drop, 1 drop, Right Eye, On Call Procedure, Karen Song MD, 1 drop at 08/01/19 0648     Review of Systems  ROS  REVIEW OF SYSTEMS:    GENERAL:  No weight loss, malaise or fevers.  HEENT:   No recent changes in vision or hearing  NECK:  No difficulty with swallowing. No stridor.   RESPIRATORY:  Negative for cough, wheezing or shortness of breath, patient denies any recent URI.  CARDIOVASCULAR:  Negative for chest pain, leg swelling or palpitations. +HTN  GI:  Negative for abdominal discomfort, blood in stools or black stools or change in bowel habits.  MUSCULOSKELETAL:  See HPI.  SKIN:  Negative for lesions, rash, and itching.  PSYCH:  No mood disorder or recent psychosocial stressors.   +anxiety +depression   HEMATOLOGY/LYMPHOLOGY:  Negative for prolonged bleeding, bruising easily or swollen nodes.  Patient is not currently taking any anti-coagulants  NEURO:   No history of headaches, syncope, paralysis, seizures or tremors.  All other reviewed and negative other than HPI.      Medical History  Past Medical History:   Diagnosis Date    Allergy     Amblyopia     Anemia     Anticoagulant long-term use     Arthritis 02/02/1992     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, estrogen receptor positive 2022    Cataract     Depression     Dry eyes     Dry mouth     Duane's syndrome of right eye     Early dry stage nonexudative age-related macular degeneration of both eyes 2022    Fibromyalgia 2014    Fibromyalgia     Fractured hip     RIGHT HIP    GERD (gastroesophageal reflux disease)     History of psychiatric hospitalization     Hyperlipidemia 1992    Hypertension     Hypocalcemia     Hyponatremia     Kidney stone     Migraine headache     Osteoporosis     Pressure ulcer of unspecified site, unspecified stage     Psoriatic arthritis 1992    Right knee pain     post knee replacement surgery (possible rejectiion of metal)    RLS (restless legs syndrome)     Schizophrenia 1992    stable on meds    Sciatica     Squamous cell carcinoma of skin     Urinary tract infection         Surgical History  Past Surgical History:   Procedure Laterality Date    brow ptosis repair Right 2019    Surgeon: Dr. Karla Henson    CATARACT EXTRACTION       SECTION      COLONOSCOPY N/A 2016    Procedure: COLONOSCOPY;  Surgeon: ANDRIA Connelly MD;  Location: [address]);  Service: Endoscopy;  Laterality: N/A;    COLONOSCOPY N/A 2022    Procedure: COLONOSCOPY;  Surgeon: Mikey Walters MD;  Location: [facility];  Service: Endoscopy;  Laterality: N/A;    cyst removed from right sinus  1982    ESOPHAGOGASTRODUODENOSCOPY N/A 2023    Procedure: EGD (ESOPHAGOGASTRODUODENOSCOPY);  Surgeon: Dougie Shea MD;  Location: [facility];  Service: Endoscopy;  Laterality: N/A;    HYSTERECTOMY      VAGINAL HYSTERECTOMY WITHOUT BSO - ENDOMETRIOSIS    INJECTION FOR SENTINEL NODE IDENTIFICATION Right 2022    Procedure: INJECTION, FOR SENTINEL NODE IDENTIFICATION-Right;  Surgeon: NEAL Dhillon MD;  Location: [facility];  Service: General;  Laterality: Right;    INJECTION OF ANESTHETIC AGENT  AROUND MEDIAL BRANCH NERVES INNERVATING LUMBAR FACET JOINT N/A 4/11/2019    Procedure: Lumbo-sacral Block, DR5 and Lateral Branches of S1,S2, S3;  Surgeon: Michelle Pineda Jr., MD;  Location: [facility];  Service: Pain Management;  Laterality: N/A;  Pt takes  and states she holds ASA on her own whenever she has procedures.  Instructed to hold x 3 days prior to procedure.      INJECTION OF ANESTHETIC AGENT AROUND MEDIAL BRANCH NERVES INNERVATING LUMBAR FACET JOINT Bilateral 5/3/2023    Procedure: Block-nerve-medial branch-lumbar bilateral L3, L4, L5;  Surgeon: Maile Storm DO;  Location: [facility];  Service: Pain Management;  Laterality: Bilateral;  asa    INJECTION OF ANESTHETIC AGENT INTO SACROILIAC JOINT Right 8/30/2018    Procedure: BLOCK, SACROILIAC JOINT-Right- ORAL SEDATION;  Surgeon: Michelle Pineda Jr., MD;  Location: [facility];  Service: Pain Management;  Laterality: Right;    INJECTION OF ANESTHETIC AGENT INTO SACROILIAC JOINT Bilateral 9/27/2018    Procedure: BLOCK, SACROILIAC JOINT-BILATERAL;  Surgeon: Michelle Pineda Jr., MD;  Location: [facility];  Service: Pain Management;  Laterality: Bilateral;  Please keep at 10:00 due to trasnsportation    INJECTION OF ANESTHETIC AGENT INTO SACROILIAC JOINT Bilateral 2/7/2019    Procedure: Bilateral Sacroiliac Joint Injection - Per Dr Pineda, not necessary to hold ASA.;  Surgeon: Michelle Pineda Jr., MD;  Location: [facility];  Service: Pain Management;  Laterality: Bilateral;    INTRAOCULAR PROSTHESES INSERTION Right 8/1/2019    Procedure: INSERTION, IOL PROSTHESIS;  Surgeon: Karen Song MD;  Location: [facility] OR [address] [place] [address];  Service: Ophthalmology;  Laterality: Right;    INTRAOCULAR PROSTHESES INSERTION Left 9/26/2019    Procedure: INSERTION, IOL PROSTHESIS;  Surgeon: Karen Song MD;  Location: [facility] OR [address] [place] [address];  Service: Ophthalmology;  Laterality: Left;    JOINT REPLACEMENT Right     knee    KNEE SURGERY       MASTECTOMY Right 5/9/2022    Procedure: MASTECTOMY-Right;  Surgeon: NEAL Dhillon MD;  Location: [facility] OR;  Service: General;  Laterality: Right;  2.5 HOURS    PHACOEMULSIFICATION OF CATARACT Right 8/1/2019    Procedure: PHACOEMULSIFICATION, CATARACT;  Surgeon: Karen Song MD;  Location: [facility] OR [address] [place] [address];  Service: Ophthalmology;  Laterality: Right;    PHACOEMULSIFICATION OF CATARACT Left 9/26/2019    Procedure: PHACOEMULSIFICATION, CATARACT;  Surgeon: Karen Song MD;  [facility];  Service: Ophthalmology;  Laterality: Left;    RADIOFREQUENCY THERMOCOAGULATION Right 5/7/2019    Procedure: RADIOFREQUENCY THERMAL COAGULATION RIGHT DORSAL RAMUS 5 AND LATERAL BRANCH OF S1, S2 AND S3;  Surgeon: Michelle Pineda Jr., MD;  Location: [facility];  Service: Pain Management;  Laterality: Right;    RADIOFREQUENCY THERMOCOAGULATION Left 5/14/2019    Procedure: RADIOFREQUENCY THERMAL COAGULATION LEFT DORSAL RAMUS 5 AND LATERAL BRANCH OF S1,S2 AND S3;  Surgeon: Michelle Pineda Jr., MD;  [facility];  Service: Pain Management;  Laterality: Left;    RADIOFREQUENCY THERMOCOAGULATION Right 10/22/2019    Procedure: RADIOFREQUENCY THERMAL COAGULATION---DARSAL RAMUS 5 and LATERAL S1,S2,and S3 Right;  Surgeon: Michelle Pineda Jr., MD;  Location: [facility];  Service: Pain Management;  Laterality: Right;  patient to sign consent DOS    RADIOFREQUENCY THERMOCOAGULATION Left 10/29/2019    Procedure: RADIOFREQUENCY THERMAL COAGULATION - LEFT - DR5, S1,S2, AND S3;  Surgeon: Michelle Pineda Jr., MD;  Location: [facility];  Service: Pain Management;  Laterality: Left;    RADIOFREQUENCY THERMOCOAGULATION Left 5/26/2020    Procedure: RADIOFREQUENCY THERMAL COAGULATION--Left DR5+ lateral branches of S1, S2, S3;  Surgeon: Michelle Pineda Jr., MD;  Location: [facility];  Service: Pain Management;  Laterality: Left;    RADIOFREQUENCY THERMOCOAGULATION Right 6/2/2020    Procedure:  RADIOFREQUENCY THERMAL COAGULATION--Right DR5+ lateral branches of S1, S2, S3;  Surgeon: Michelle Pineda Jr., MD;  Location: [facility];  Service: Pain Management;  Laterality: Right;    SENTINEL LYMPH NODE BIOPSY Right 5/9/2022    Procedure: BIOPSY, LYMPH NODE, SENTINEL-Right;  Surgeon: NEAL Dhillon MD;  Location: [facility];  Service: General;  Laterality: Right;    Surgery on right knee  07/09/1982    tumor removed from back left side upper shoulder  03/17/2006        Physical Exam  Vitals:    05/22/23 1349   Weight: 46.6 kg (102 lb 11.8 oz)     Ortho/SPM Exam  PHYSICAL EXAMINATION:    GENERAL: Well appearing, in no acute distress, alert and oriented x3.  PSYCH:  Mood and affect appropriate.  SKIN: Skin color, texture, turgor normal, no rashes or lesions.  HEAD/FACE:  Normocephalic, atraumatic. Cranial nerves grossly intact.  NECK: Normal ROM. Supple.   CV: RRR with palpation of the radial artery.  PULM: No evidence of respiratory difficulty, symmetric chest rise.  GI:  Soft and non-distended.  MSK: Straight leg raising is  negative to radicular pain. There is pain to palpation over the facet joints of the lumbar spine. There is pain with lumbar facet loading. There is pain over the SI joints. Normal range of motion without pain reproduction with lumbar flexion. Pain with extension.  Peripheral joint ROM is full and pain free without obvious instability or laxity in all four extremities. No deformities, edema, or skin discoloration.  No atrophy or tone abnormalities are noted.   NEURO: Bilateral upper and lower extremity coordination and strength is symmetric.  No loss of sensation is noted.  MENTAL STATUS: A x O x 3, good concentration, speech is fluent and goal directed  MOTOR: 5/5 in all muscle groups  GAIT: normal.    Imaging  MRI LUMBAR SPINE WITHOUT CONTRAST     CLINICAL HISTORY:  low back pain; Sacrococcygeal disorders, not elsewhere classified     TECHNIQUE:  Multiplanar, multisequence MR images were  acquired from the thoracolumbar junction to the sacrum without contrast.     COMPARISON:  03/06/2023     FINDINGS:  Alignment: Normal.     Vertebrae: Degenerative endplate changes throughout the lumbar spine.     Discs: Moderate to severe disc height loss throughout the lumbar spine.  No evidence for discitis.     Cord: Conus terminates at L2 and appears unremarkable.  Probable thin fatty filum terminale noted.     Degenerative findings:     T12-L1: Small central protrusion.  No spinal canal stenosis or neural foraminal narrowing.     L1-L2: Circumferential disc bulge with right paracentral/foraminal protrusion result in mild effacement of the thecal sac and moderate right neural foraminal narrowing.     L2-L3: Circumferential disc bulge.  No spinal canal stenosis or neural foraminal narrowing.     L3-L4: Circumferential disc bulge with left paracentral disc extrusion and moderate facet arthropathy result in effacement of the left lateral recess and severe left neural foraminal narrowing.     L4-L5: Circumferential disc bulge and mild facet arthropathy result in mild effacement of the thecal sac and moderate left, mild right neural foraminal narrowing.     L5-S1: Circumferential disc bulge and moderate facet arthropathy result in mild effacement of the thecal sac and severe right, moderate left neural foraminal narrowing.     Paraspinal muscles & soft tissues: Mild paraspinal muscle atrophy.  Questionable gallbladder wall thickening and small stones.     Impression:     1. Multilevel degenerative changes of the lumbar spine detailed above.  Moderate to severe neural foraminal narrowing at L1-L2 and L3-S1.  2. Questionable gallbladder wall thickening and small stones.  Recommend further evaluation with abdominal ultrasound.        Electronically signed by: Akash Black MD  Date:                                            03/12/2023      Labs:  BMP  Lab Results   Component Value Date     (L) 05/09/2023    K  4.2 05/09/2023    CL 96 05/09/2023    CO2 26 05/09/2023    BUN 5 (L) 05/09/2023    CREATININE 0.8 05/09/2023    CALCIUM 9.4 05/09/2023    ANIONGAP 8 05/09/2023    EGFRNORACEVR >60.0 05/09/2023     Lab Results   Component Value Date    ALT 17 05/09/2023    AST 24 05/09/2023    GGT 91 (H) 07/28/2014    ALKPHOS 66 05/09/2023    BILITOT 0.2 05/09/2023       Assessment:  Problem List Items Addressed This Visit          Neuro    Chronic pain     Other Visit Diagnoses       Lumbar radiculopathy    -  Primary    DDD (degenerative disc disease), lumbar        Spinal stenosis of lumbar region with neurogenic claudication        Lumbar spondylosis                03/14/2023 -Bahvna KYM Landry is a 67 y.o. 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, estrogen receptor positive (04/13/2022), Cataract, Depression, Dry eyes, Dry mouth, Duane's syndrome of right eye, Early dry stage nonexudative age-related macular degeneration of both eyes (09/20/2022), Fibromyalgia (04/17/2014), Fibromyalgia, Fractured hip, GERD (gastroesophageal reflux disease), History of psychiatric hospitalization, Hyperlipidemia (02/02/1992), Hypertension, Hypocalcemia, Hyponatremia, Kidney stone, Migraine headache, Osteoporosis, Pressure ulcer of unspecified site, unspecified stage, Psoriatic arthritis (02/02/1992), Right knee pain, RLS (restless legs syndrome), Schizophrenia (02/02/1992), Sciatica, Squamous cell carcinoma of skin, and Urinary tract infection.  By history and examination this patient has chronic low back pain without radiculopathy.  The underlying cause cause is facet arthritis and deconditioning.  Pathology is confirmed by imaging.  We discussed the underlying diagnoses and multiple treatment options including non-opioid medications, interventional procedures, physical therapy, and home exercise.  The risks  and benefits of each treatment option were discussed and all questions were answered.      5/22/2023- 66 y/o female with a Hx of chronic low back  and bilateral leg pain she is s/p a diagnosis lumbar MBB targeting L4/5 and L5/S1 reporting 0% relief of her symptoms. Her Lumbar MRI multilevel disc bulges starting   at L3 through S1.  She has severe right, moderate left neural foraminal narrowing at L5-S1 thta may be causing her Low back and leg pain.  Her pain was refractory to diagnositic low lumbar MBB so i will attempt a L4-5 Lumbar SOPHY.       Plan & Recommendations  Procedures: s/f a Lumbar SOPHY targeting L4-5                        Failed Diagnostic Lumbar MBB   Medications: Continue medications as previously prescribed.   Imaging: reviewed and discussed in detail  PT/OT/HEP: I encouraged the patient to maintain a home exercise regimen that includes daily, moderate cardiovascular exercise lasting at least 30 minutes.  This may include yoga, gela chi, walking, swimming, aqua aerobics, or other exercises that maintain a heart rate of 50-70% of the calculated maximum heart rate.  I also encouraged light, daily stretching focused on the target area.  Follow Up: RTC 1-2 weeks post procedure     RAMY BrownC  Interventional Pain Management      Disclaimer: This note was partly generated using dictation software which may occasionally result in transcription errors.

## 2023-05-23 ENCOUNTER — TELEPHONE (OUTPATIENT)
Dept: PAIN MEDICINE | Facility: CLINIC | Age: 68
End: 2023-05-23
Payer: MEDICARE

## 2023-05-23 ENCOUNTER — TELEPHONE (OUTPATIENT)
Dept: OPHTHALMOLOGY | Facility: CLINIC | Age: 68
End: 2023-05-23
Payer: MEDICARE

## 2023-05-23 ENCOUNTER — OFFICE VISIT (OUTPATIENT)
Dept: RHEUMATOLOGY | Facility: CLINIC | Age: 68
End: 2023-05-23
Payer: MEDICARE

## 2023-05-23 VITALS
BODY MASS INDEX: 17.67 KG/M2 | HEART RATE: 66 BPM | WEIGHT: 99.75 LBS | SYSTOLIC BLOOD PRESSURE: 111 MMHG | DIASTOLIC BLOOD PRESSURE: 63 MMHG

## 2023-05-23 DIAGNOSIS — L40.50 PSORIATIC ARTHRITIS: ICD-10-CM

## 2023-05-23 DIAGNOSIS — G89.29 CHRONIC PAIN OF LEFT ANKLE: Primary | ICD-10-CM

## 2023-05-23 DIAGNOSIS — M25.572 CHRONIC PAIN OF LEFT ANKLE: Primary | ICD-10-CM

## 2023-05-23 PROCEDURE — 99999 PR PBB SHADOW E&M-EST. PATIENT-LVL III: ICD-10-PCS | Mod: PBBFAC,,, | Performed by: INTERNAL MEDICINE

## 2023-05-23 PROCEDURE — 3078F PR MOST RECENT DIASTOLIC BLOOD PRESSURE < 80 MM HG: ICD-10-PCS | Mod: CPTII,S$GLB,, | Performed by: INTERNAL MEDICINE

## 2023-05-23 PROCEDURE — 1125F PR PAIN SEVERITY QUANTIFIED, PAIN PRESENT: ICD-10-PCS | Mod: CPTII,S$GLB,, | Performed by: INTERNAL MEDICINE

## 2023-05-23 PROCEDURE — 99999 PR PBB SHADOW E&M-EST. PATIENT-LVL III: CPT | Mod: PBBFAC,,, | Performed by: INTERNAL MEDICINE

## 2023-05-23 PROCEDURE — 3074F PR MOST RECENT SYSTOLIC BLOOD PRESSURE < 130 MM HG: ICD-10-PCS | Mod: CPTII,S$GLB,, | Performed by: INTERNAL MEDICINE

## 2023-05-23 PROCEDURE — 1159F MED LIST DOCD IN RCRD: CPT | Mod: CPTII,S$GLB,, | Performed by: INTERNAL MEDICINE

## 2023-05-23 PROCEDURE — 3008F PR BODY MASS INDEX (BMI) DOCUMENTED: ICD-10-PCS | Mod: CPTII,S$GLB,, | Performed by: INTERNAL MEDICINE

## 2023-05-23 PROCEDURE — 1159F PR MEDICATION LIST DOCUMENTED IN MEDICAL RECORD: ICD-10-PCS | Mod: CPTII,S$GLB,, | Performed by: INTERNAL MEDICINE

## 2023-05-23 PROCEDURE — 3074F SYST BP LT 130 MM HG: CPT | Mod: CPTII,S$GLB,, | Performed by: INTERNAL MEDICINE

## 2023-05-23 PROCEDURE — 99213 PR OFFICE/OUTPT VISIT, EST, LEVL III, 20-29 MIN: ICD-10-PCS | Mod: S$GLB,,, | Performed by: INTERNAL MEDICINE

## 2023-05-23 PROCEDURE — 1125F AMNT PAIN NOTED PAIN PRSNT: CPT | Mod: CPTII,S$GLB,, | Performed by: INTERNAL MEDICINE

## 2023-05-23 PROCEDURE — 4010F PR ACE/ARB THEARPY RXD/TAKEN: ICD-10-PCS | Mod: CPTII,S$GLB,, | Performed by: INTERNAL MEDICINE

## 2023-05-23 PROCEDURE — 99213 OFFICE O/P EST LOW 20 MIN: CPT | Mod: S$GLB,,, | Performed by: INTERNAL MEDICINE

## 2023-05-23 PROCEDURE — 3008F BODY MASS INDEX DOCD: CPT | Mod: CPTII,S$GLB,, | Performed by: INTERNAL MEDICINE

## 2023-05-23 PROCEDURE — 4010F ACE/ARB THERAPY RXD/TAKEN: CPT | Mod: CPTII,S$GLB,, | Performed by: INTERNAL MEDICINE

## 2023-05-23 PROCEDURE — 3078F DIAST BP <80 MM HG: CPT | Mod: CPTII,S$GLB,, | Performed by: INTERNAL MEDICINE

## 2023-05-23 RX ORDER — METHOTREXATE 2.5 MG/1
20 TABLET ORAL
Qty: 96 TABLET | Refills: 0 | Status: SHIPPED | OUTPATIENT
Start: 2023-05-23 | End: 2023-08-22 | Stop reason: SDUPTHER

## 2023-05-23 RX ORDER — METHOTREXATE 2.5 MG/1
20 TABLET ORAL
Qty: 96 TABLET | Refills: 0 | Status: SHIPPED | OUTPATIENT
Start: 2023-05-23 | End: 2023-05-23 | Stop reason: SDUPTHER

## 2023-05-23 ASSESSMENT — ROUTINE ASSESSMENT OF PATIENT INDEX DATA (RAPID3)
PSYCHOLOGICAL DISTRESS SCORE: 2.2
FATIGUE SCORE: 4
PATIENT GLOBAL ASSESSMENT SCORE: 3
PAIN SCORE: 7
TOTAL RAPID3 SCORE: 3.78
MDHAQ FUNCTION SCORE: 0.4
AM STIFFNESS SCORE: 1, YES
WHEN YOU AWAKENED IN THE MORNING OVER THE LAST WEEK, PLEASE INDICATE THE AMOUNT OF TIME IT TAKES UNTIL YOU ARE AS LIMBER AS YOU WILL BE FOR THE DAY: 1HOUR

## 2023-05-23 NOTE — PROGRESS NOTES
Subjective:      Patient ID: Bhavna Landry is a 67 y.o. female.    Chief Complaint: Psoriatic arthritis; guttate psoriasis; osteoporosis; OA failed right TKA  HPI  major issue is LBP with sciatica, seeing Pain Management. 1 hr am stiffness.   Some left ankle pain  Review of Systems   Constitutional:  Positive for fatigue (4/10) and unexpected weight change. Negative for appetite change and fever.   HENT:  Negative for mouth sores.         Dry     Eyes:  Negative for visual disturbance.        Dry     Respiratory:  Negative for cough, shortness of breath and wheezing.    Cardiovascular:  Negative for chest pain and palpitations.   Gastrointestinal:  Positive for constipation. Negative for abdominal pain, anal bleeding, blood in stool, diarrhea, nausea and vomiting.   Genitourinary:  Negative for dysuria, frequency and urgency.   Musculoskeletal:  Positive for back pain. Negative for arthralgias, gait problem, joint swelling, myalgias, neck pain and neck stiffness.   Skin:  Negative for rash.        Hair loss     Neurological:  Positive for headaches. Negative for weakness and numbness.   Hematological:  Negative for adenopathy. Does not bruise/bleed easily.   Psychiatric/Behavioral:  Negative for sleep disturbance. The patient is not nervous/anxious.       Objective:   /63   Pulse 66   Wt 45.2 kg (99 lb 12.1 oz)   BMI 17.67 kg/m²   Physical Exam    Left ankle without redness or swelling  Slight pain with tibiotalar, Achilles and plantar fascia         5/17/2021 10/25/2021 2/7/2022 1/12/2023   Tender (CLARK-28) 0 / 28  0 / 28  10 / 28  10 / 28    Swollen (CLARK-28) 0 / 28  1 / 28  4 / 28  3 / 28    Provider Global -- 0 mm 20 mm 10 mm   Patient Global 6 mm 50 mm 55 mm 50 mm   ESR 2 mm/hr -- 3 mm/hr 10 mm/hr   CRP 0.2 mg/L -- 0.3 mg/L 0.5 mg/L   CLARK-28 (ESR) 0.57 (Remission) -- 3.87 (Moderate disease activity) 4.57 (Moderate disease activity)   CLARK-28 (CRP) 1.11 (Remission) -- 4.16 (Moderate disease activity) 4.06  (Moderate disease activity)   CDAI Score -- 6  21.5  19       Latest Reference Range & Units 05/09/23 08:02   WBC 3.90 - 12.70 K/uL 13.24 (H)   RBC 4.00 - 5.40 M/uL 4.14   Hemoglobin 12.0 - 16.0 g/dL 13.2   Hematocrit 37.0 - 48.5 % 38.9   MCV 82 - 98 fL 94   MCH 27.0 - 31.0 pg 31.9 (H)   MCHC 32.0 - 36.0 g/dL 33.9   RDW 11.5 - 14.5 % 16.6 (H)   Platelets 150 - 450 K/uL 338   MPV 9.2 - 12.9 fL 8.2 (L)   Gran % 38.0 - 73.0 % 78.3 (H)   Lymph % 18.0 - 48.0 % 12.5 (L)   Mono % 4.0 - 15.0 % 6.6   Eosinophil % 0.0 - 8.0 % 1.7   Basophil % 0.0 - 1.9 % 0.4   Immature Granulocytes 0.0 - 0.5 % 0.5   Gran # (ANC) 1.8 - 7.7 K/uL 10.4 (H)   Lymph # 1.0 - 4.8 K/uL 1.7   Mono # 0.3 - 1.0 K/uL 0.9   Eos # 0.0 - 0.5 K/uL 0.2   Baso # 0.00 - 0.20 K/uL 0.05   Immature Grans (Abs) 0.00 - 0.04 K/uL 0.06 (H)   nRBC 0 /100 WBC 0   Differential Method  Automated   Sodium 136 - 145 mmol/L 130 (L)   Potassium 3.5 - 5.1 mmol/L 4.2   Chloride 95 - 110 mmol/L 96   CO2 23 - 29 mmol/L 26   Anion Gap 8 - 16 mmol/L 8   BUN 8 - 23 mg/dL 5 (L)   Creatinine 0.5 - 1.4 mg/dL 0.8   eGFR >60 mL/min/1.73 m^2 >60.0   Glucose 70 - 110 mg/dL 83   Calcium 8.7 - 10.5 mg/dL 9.4   Alkaline Phosphatase 55 - 135 U/L 66   PROTEIN TOTAL 6.0 - 8.4 g/dL 6.1   Albumin 3.5 - 5.2 g/dL 3.7   BILIRUBIN TOTAL 0.1 - 1.0 mg/dL 0.2   AST 10 - 40 U/L 24   ALT 10 - 44 U/L 17   (H): Data is abnormally high  (L): Data is abnormally low      Latest Reference Range & Units 02/14/23 07:13   Vit D, 25-Hydroxy 30 - 96 ng/mL 60       Colonoscopy 12/9/23:   Impression:            - Seven 4 to 7 mm polyps at the recto-sigmoid                          colon, in the transverse colon and in the                          ascending colon, removed with a cold snare.                          Resected and retrieved.                          - The distal rectum and anal verge are normal on                          retroflexion view.   EGD 2/7/23:     Impression:            - Normal esophagus.                           - Erosive gastropathy with no bleeding and no                          stigmata of recent bleeding. Biopsied.                          - Normal examined duodenum. Biopsied.     Assessment:   Cervical demyelination with etanercept in past, no anti-TNF  Failed secukinumab  Failed sulfasalazine  Did not feel tofacitinib helped and stopped after visit of 22 and not refilled( 22), but patient apparently has been getting automatic refills without refill approvals.   Guselkumab ordered 22 never filled        PsA  TJ 0 SJ 2  Pt global 30  ESR  CRP DAS28 4  IUX13-XUO CDAI 6(LDA)  DAPSA  TJ 1 SJ 2 Pt global 3 Pt pain 7  CRP  = 13(LDA) no need to add guselkumab just now.   Left ankle pain with Achilles tendinitis and plantar fasciitis  Osteoporosis DXA 22 FRAX major 13.5 % hip 3.1%, s/p hip fracture;received  Reclast zoledronic acid(5mg IV) 20, 3/17/22. Now on Zometa (zoledronic acid) (4mg IV) q 6 months for breast cancer from Dr. Marcelino  so no need for Reclast   Psoriasis, mild scalp only  OA  Breast cancer on anastrozole                    Plan:   ESR, CRP today  X-ray left ankle  Declines OT/PT  Increase methotrexate to 20mg po every Monday, divided dose  Folic acid 1mg daily  Diclofenac gel to left ankle  CBC, CMP in 4 wks  Continue Zometa 4mg q 6 months per Dr. Marcelino in Heme-Onc   If no improvement with increased methotrexate would add guselkumab 100mg sc wk 0,4 then q 8 wks. If OK with Dr. Marcelino her oncologist   RTC 3 months with standing labs

## 2023-05-23 NOTE — TELEPHONE ENCOUNTER
----- Message from Nevaeh Centeno MA sent at 5/23/2023  4:03 PM CDT -----  Contact: pt  Pt returning call    ----- Message -----  From: Victorina Greer  Sent: 5/23/2023   3:44 PM CDT  To: Emeterio Gr Staff    Type:  Call Back    Who Called:pt    Does the patient know what this is regarding?:schedule procedure   Would the patient rather a call back or a response via [facility]? [facility] Call Back Number:[phone]  Additional Information:

## 2023-05-23 NOTE — PATIENT INSTRUCTIONS
Increase methotrexate to  4 tabs  Mon am and 4 tabs Mon pm  Continue folic acid 1mg daily  Labs in 4 wkd   Diclofenac gel to left ankle  X-ray of the left ankle

## 2023-05-24 ENCOUNTER — HOSPITAL ENCOUNTER (OUTPATIENT)
Dept: RADIOLOGY | Facility: HOSPITAL | Age: 68
Discharge: HOME | End: 2023-05-24
Attending: INTERNAL MEDICINE
Payer: MEDICARE

## 2023-05-24 DIAGNOSIS — G89.29 CHRONIC PAIN OF LEFT ANKLE: ICD-10-CM

## 2023-05-24 DIAGNOSIS — M25.572 CHRONIC PAIN OF LEFT ANKLE: ICD-10-CM

## 2023-05-24 PROCEDURE — 73610 XR ANKLE COMPLETE 3 VIEW LEFT: ICD-10-PCS | Mod: 26,LT,, | Performed by: RADIOLOGY

## 2023-05-24 PROCEDURE — 73610 X-RAY EXAM OF ANKLE: CPT | Mod: 26,LT,, | Performed by: RADIOLOGY

## 2023-05-24 PROCEDURE — 73610 X-RAY EXAM OF ANKLE: CPT | Mod: TC,FY,PO,LT

## 2023-05-25 ENCOUNTER — CLINICAL SUPPORT (OUTPATIENT)
Dept: REHABILITATION | Facility: HOSPITAL | Age: 68
End: 2023-05-25
Payer: MEDICARE

## 2023-05-25 DIAGNOSIS — M53.86 DECREASED RANGE OF MOTION OF LUMBAR SPINE: ICD-10-CM

## 2023-05-25 DIAGNOSIS — Z74.09 IMPAIRED FUNCTIONAL MOBILITY, BALANCE, GAIT, AND ENDURANCE: ICD-10-CM

## 2023-05-25 DIAGNOSIS — R26.89 DECREASED FUNCTIONAL MOBILITY: Primary | ICD-10-CM

## 2023-05-25 PROCEDURE — 97110 THERAPEUTIC EXERCISES: CPT | Mod: PN

## 2023-05-25 NOTE — PROGRESS NOTES
[facility]   Physical Therapy Treatment Note     Name: Bhavna Landry  [facility] Number: [id number]    Therapy Diagnosis:   Encounter Diagnoses   Name Primary?    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     Precautions: Standard, asthma, smoker, osteoporosis    PTA Visit #: 0/5     Time In: 7:03 am  Time Out: 8:00 am  Total Billable Time: 30 minutes - 2 TE-- 1:1 physical therapist       SUBJECTIVE     Pt reports: continued left PSIS low level pain. Started going to the gym with a friend and walks for 30 minutes.     She was compliant with home exercise program.  Response to previous treatment: pain relief   Functional change: No longer having radiating pain down lower extremity     Pain: 4/10  Location: left lumbosacral region     OBJECTIVE     Objective Measures updated at progress report unless specified.       5/18/23: 7 mins  (+) SLR pain in buttocks (L)     (+) Ely's test (tight hip flexors)    Joint Mobility:   L3-L5  painful     Foot assessment:  Right foot: Hallux valgus  Left foot: Good medial longitudinal arch at baseline.  Marching: good mobility in right foot, less in left  Trunk rotation: Increased mobility in right foot, less in left    Navicular drop test:    L foot: 4.6 cm at rest, 5.2 cm with subtalar neutral = 0.6cm or 6 mm= neutral foot  R foot: 4.6 cm at rest, 5.5cm with  "subtalar neutral= 0.9cm or 9mm = neutral, almost pronated foot        Treatment     Jessica received the treatments listed below:      therapeutic exercises to develop strength, endurance, ROM, flexibility, posture, and core stabilization for 30 minutes 1:1 physical therapist including:      SciFit to promote lower extremity mobility and cardiovascular endurance: 6 mins,  L5    Prone:   Hip extension: 10 reps, 5'' hold (VC to maintain knee extension)    Propped hook-lying on red wedge:  Transverse abdominus contraction with hip abduction (blue belt) x 10x10'' hold   Transverse abdominus contraction with hip adduction (yellow ball): 10x10" hold   Bridges with gait belt hip abduction: 10 reps, 10'' hold  Double knee to chest: 30'' x 3 trials   Piriformis stretch x 30'' hold x 3 trials  Side-lying hip abduction: 2x10 bilateral  Reverse clamshells: 2x10 bilateral  Hip flexor stretch at step: 5x20"  Sit to stands with isometric hip abduction with blue belt: 20x   Left sciatic nerve glides: 15x     manual therapy techniques: Joint mobilizations and Soft tissue Mobilization were applied for 0 minutes 1:1 physical therapist   , including:  Muscle energy technique to promote left piriformis activation    Patient Education and Home Exercises     Home Exercises Provided and Patient Education Provided     Education provided:   - home exercise program review  - add sink distraction stretch    Written Home Exercises Provided: Patient instructed to cont prior HEP. Exercises were reviewed and Jessica was able to demonstrate them prior to the end of the session.  Jessica demonstrated good  understanding of the education provided. See EMR under Patient Instructions for exercises provided during therapy sessions    ASSESSMENT   Bhavna is a 67 y.o. female referred to outpatient Physical Therapy with a medical diagnosis of M53.3 (ICD-10-CM) - SI (sacroiliac) joint dysfunction and M47.816 (ICD-10-CM) - Spondylosis of lumbar region without " myelopathy or radiculopathy. Patient presents with same low level pain at left PSIS region. Started going to the gym 30 minutes two times a week and walks with a friend, which has helped some of her pain. Added hip flexor stretches, internal rotator, and functional sit to stand strengthening this session with good response. Continued with muscle energy techniques and stabilization. Provided updated home exercise program to promote glute strengthening and hip flexor flexibility, and hip extension mobility. Extensive education regarding importance of compliance with home exercise program in order to see improvements. Pt will benefit from continued therapy that focuses on gluteal activation/strengthening, lumbar mobility due to multi-level arthropathy and neural mobilization and/or further lumbar stability to promote pain relief.     Jessica Is progressing well towards her goals.   Pt prognosis is Good.     Pt will continue to benefit from skilled outpatient physical therapy to address the deficits listed in the problem list box on initial evaluation, provide pt/family education and to maximize pt's level of independence in the home and community environment.     Pt's spiritual, cultural and educational needs considered and pt agreeable to plan of care and goals.     Anticipated barriers to physical therapy: co-morbidities    Goals:   Short Term Goals (4 Weeks):  1. Patient will be compliant with home exercise program to supplement therapy in promoting functional mobility.  2. Patient will perform transverse abdominus contraction with good control to demonstrate improved core strength.  3. Patient will report no pain during thoracolumbar active range of motion to promote functional mobility.  4. Patient will improve impaired lower extremity myotomes/manual muscle tests  to >/=4/5 to improve strength for functional tasks.  5. Patient will reports </= 3/10 pain at rest to improve quality of life.     Long Term Goals (8  Weeks):   1. Patient will improve FOTO score to </= 50% limited to decrease perceived limitation with maintaining/changing body position.   2. Patient will improve bilateral hip external/internal rotation range of motion to 30 degrees to promote functional mobility.  3. Patient will improve impaired lower extremity myotomes/manual muscle tests to >/=4+/5 to improve strength for functional tasks.  4. Patient will report no pain while walking 1 mile to return to daily walks.     PLAN   Left hip PA mobilizations - grade II-III  Hip extension mobility   Hip flexor flexibility  Gluteal activation/strengthening   Lumbar stabilization    Callie Emanuel, PT  , DPT  5/25/2023

## 2023-05-25 NOTE — PATIENT INSTRUCTIONS
"  Reverse clamshells: 3x10   Clamshells: 3x10   Standing hip flexor stretch at step: 5x20"  Piriformis stretch: 5x20"    "

## 2023-05-30 ENCOUNTER — TELEPHONE (OUTPATIENT)
Dept: ADMINISTRATIVE | Facility: OTHER | Age: 68
End: 2023-05-30
Payer: MEDICARE

## 2023-05-30 ENCOUNTER — PATIENT MESSAGE (OUTPATIENT)
Dept: PAIN MEDICINE | Facility: CLINIC | Age: 68
End: 2023-05-30
Payer: MEDICARE

## 2023-05-30 ENCOUNTER — TELEPHONE (OUTPATIENT)
Dept: INTERNAL MEDICINE | Facility: CLINIC | Age: 68
End: 2023-05-30
Payer: MEDICARE

## 2023-05-30 ENCOUNTER — CLINICAL SUPPORT (OUTPATIENT)
Dept: REHABILITATION | Facility: HOSPITAL | Age: 68
End: 2023-05-30
Payer: MEDICARE

## 2023-05-30 ENCOUNTER — HOSPITAL ENCOUNTER (OUTPATIENT)
Dept: RADIOLOGY | Facility: HOSPITAL | Age: 68
Discharge: HOME | End: 2023-05-30
Attending: PHYSICIAN ASSISTANT
Payer: MEDICARE

## 2023-05-30 ENCOUNTER — TELEPHONE (OUTPATIENT)
Dept: PAIN MEDICINE | Facility: CLINIC | Age: 68
End: 2023-05-30
Payer: MEDICARE

## 2023-05-30 DIAGNOSIS — Z74.09 IMPAIRED FUNCTIONAL MOBILITY, BALANCE, GAIT, AND ENDURANCE: ICD-10-CM

## 2023-05-30 DIAGNOSIS — Z12.31 SCREENING MAMMOGRAM, ENCOUNTER FOR: ICD-10-CM

## 2023-05-30 DIAGNOSIS — M53.86 DECREASED RANGE OF MOTION OF LUMBAR SPINE: ICD-10-CM

## 2023-05-30 DIAGNOSIS — N63.21 MASS OF UPPER OUTER QUADRANT OF LEFT BREAST: ICD-10-CM

## 2023-05-30 DIAGNOSIS — R26.89 DECREASED FUNCTIONAL MOBILITY: Primary | ICD-10-CM

## 2023-05-30 PROCEDURE — 77061 BREAST TOMOSYNTHESIS UNI: CPT | Mod: TC,LT

## 2023-05-30 PROCEDURE — 76642 ULTRASOUND BREAST LIMITED: CPT | Mod: TC,LT

## 2023-05-30 PROCEDURE — 77065 DX MAMMO INCL CAD UNI: CPT | Mod: 26,LT,, | Performed by: RADIOLOGY

## 2023-05-30 PROCEDURE — 76642 US BREAST LEFT LIMITED: ICD-10-PCS | Mod: 26,LT,, | Performed by: RADIOLOGY

## 2023-05-30 PROCEDURE — 76642 ULTRASOUND BREAST LIMITED: CPT | Mod: 26,LT,, | Performed by: RADIOLOGY

## 2023-05-30 PROCEDURE — 77061 BREAST TOMOSYNTHESIS UNI: CPT | Mod: 26,LT,, | Performed by: RADIOLOGY

## 2023-05-30 PROCEDURE — 97110 THERAPEUTIC EXERCISES: CPT | Mod: PN

## 2023-05-30 PROCEDURE — 77065 MAMMO DIGITAL DIAGNOSTIC LEFT WITH TOMO: ICD-10-PCS | Mod: 26,LT,, | Performed by: RADIOLOGY

## 2023-05-30 PROCEDURE — 77061 MAMMO DIGITAL DIAGNOSTIC LEFT WITH TOMO: ICD-10-PCS | Mod: 26,LT,, | Performed by: RADIOLOGY

## 2023-05-30 NOTE — PROGRESS NOTES
[facility]   Physical Therapy Treatment Note     Name: Bhavna Landry  [facility] Number: [id number]    Therapy Diagnosis:   Encounter Diagnoses   Name Primary?    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     Precautions: Standard, asthma, smoker, osteoporosis    PTA Visit #: 0/5     Time In: 7:03 am  Time Out: 7:57 am  Total Billable Time: 54 minutes - 4 TE-- 1:1 physical therapist       SUBJECTIVE     Pt reports: Continuing to go to the gym and walk about 2 miles. Wants to start trying to do some machines.     She was compliant with home exercise program.  Response to previous treatment: improved pain levels   Functional change: No longer having radiating pain down lower extremity     Pain: 3/10  Location: left and right lumbosacral region     OBJECTIVE     Objective Measures updated at progress report unless specified.       5/18/23: 7 mins  (+) SLR pain in buttocks (L)     (+) Ely's test (tight hip flexors)    Joint Mobility:   L3-L5  painful     Foot assessment:  Right foot: Hallux valgus  Left foot: Good medial longitudinal arch at baseline.  Marching: good mobility in right foot, less in left  Trunk rotation: Increased mobility in right foot, less in left    Navicular drop test:    L foot: 4.6 cm at rest, 5.2 cm with subtalar neutral = 0.6cm or 6 mm= neutral foot  R foot: 4.6 cm at rest,  "5.5cm with subtalar neutral= 0.9cm or 9mm = neutral, almost pronated foot        Treatment     Jessica received the treatments listed below:      therapeutic exercises to develop strength, endurance, ROM, flexibility, posture, and core stabilization for 54 minutes 1:1 physical therapist including:      Hip flexor stretch at step: 5x20"  Sit to stands with isometric hip abduction with blue belt: 20x   Left seated sciatic nerve glides: 25x   Cybex leg press: seat 6 - plate 6 3x10  Cybex hamstring curls: 4 plates - 3x10   3-way swiss ball roll outs: 10x each direction    Resisted hip extension on treadmill: 5x each  Palloff press: red theraband - 2x10   Side stepping with multifidi isometric ball hold: 1 kg red ball - 3 laps 10 steps each   Multifidi isometric push backs into wall: 20x5"  Standing open books: 10x each   Reverse clamshells: 3x10 bilateral  Bridges with gait belt hip abduction: 10 reps, 10'' hold      manual therapy techniques: Joint mobilizations and Soft tissue Mobilization were applied for 0 minutes 1:1 physical therapist including:  Muscle energy technique to promote left piriformis activation    Patient Education and Home Exercises     Home Exercises Provided and Patient Education Provided     Education provided:   - home exercise program review  - add sink distraction stretch    Written Home Exercises Provided: Patient instructed to cont prior HEP. Exercises were reviewed and Jessica was able to demonstrate them prior to the end of the session.  Jessica demonstrated good  understanding of the education provided. See EMR under Patient Instructions for exercises provided during therapy sessions    ASSESSMENT   Bhavna is a 67 y.o. 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 Progressed towards more standing therex versus mat with " good tolerance. Focused on multifidi activation and lower extremity cybex resistance training as patient is transitioning towards discharge to community gym. Re-emphasized importance of compliance with home exercise routine. Pt will benefit from continued therapy that focuses on gluteal activation/strengthening, lumbar mobility due to multi-level arthropathy and neural mobilization and/or further lumbar stability to promote pain relief.     Jessica Is progressing well towards her goals.   Pt prognosis is Good.     Pt will continue to benefit from skilled outpatient physical therapy to address the deficits listed in the problem list box on initial evaluation, provide pt/family education and to maximize pt's level of independence in the home and community environment.     Pt's spiritual, cultural and educational needs considered and pt agreeable to plan of care and goals.     Anticipated barriers to physical therapy: co-morbidities    Goals:   Short Term Goals (4 Weeks):  1. Patient will be compliant with home exercise program to supplement therapy in promoting functional mobility.  2. Patient will perform transverse abdominus contraction with good control to demonstrate improved core strength.  3. Patient will report no pain during thoracolumbar active range of motion to promote functional mobility.  4. Patient will improve impaired lower extremity myotomes/manual muscle tests  to >/=4/5 to improve strength for functional tasks.  5. Patient will reports </= 3/10 pain at rest to improve quality of life.     Long Term Goals (8 Weeks):   1. Patient will improve FOTO score to </= 50% limited to decrease perceived limitation with maintaining/changing body position.   2. Patient will improve bilateral hip external/internal rotation range of motion to 30 degrees to promote functional mobility.  3. Patient will improve impaired lower extremity myotomes/manual muscle tests to >/=4+/5 to improve strength for functional  tasks.  4. Patient will report no pain while walking 1 mile to return to daily walks.     PLAN   Left hip PA mobilizations - grade II-III  Hip extension mobility   Hip flexor flexibility  Gluteal activation/strengthening   Lumbar stabilization    Callie Emanuel, PT  , DPT  5/30/2023

## 2023-05-30 NOTE — TELEPHONE ENCOUNTER
----- Message from Nevaeh Centeno MA sent at 5/25/2023  2:11 PM CDT -----  Contact: Pt  Pt returning call    ----- Message -----  From: Basilio Ovalles  Sent: 5/25/2023  12:59 PM CDT  To: Emeterio Gr Staff    .Type:  Needs Medical Advice    Who Called: Pt  Would the patient rather a call back or a response via [facility]? call  Best Call Back Number: [phone]  Additional Information:   Pt has requested to speak with Chika and said it is about scheduling a procedure.

## 2023-05-30 NOTE — TELEPHONE ENCOUNTER
Patient has been scheduled for procedure on 7/6/23.  Confirms blood thinners  Denies/Confirms pacemaker/ICD      Check in at the registration desk on the first floor of the hospital.   Please DO NOT eat or drink 8 hours prior to your arrival time for the procedure, if diabetic 6 hours prior. If you are taking medications for blood pressure, heart medications, thyroid, cholesterol; you can take them with a small sip of water.  Refrain from drinking alcohol within 24 hours prior to your procedure  You will need someone to drive you home after procedure. You cannot take taxi, Uber, or Lyft.  If you start feeling sick (fever, chills, or coughing) or start on any antibiotics please contact us at [phone] to reschedule.        Pt voiced understanding and confirmed procedure date.

## 2023-05-30 NOTE — TELEPHONE ENCOUNTER
"Attempted to contact pt to inform per Dr. Vargas "She may hold asa x 5 days."    No answer. Left message.   "

## 2023-05-30 NOTE — TELEPHONE ENCOUNTER
----- Message from Chika Mcallister MA sent at 5/30/2023  9:58 AM CDT -----  Regarding: med clearance  Pt is scheduled to have a Lumbar SOPHY with Dr. Storm on 7/26 and pt needs med clearance to hold asa 5 days prior. Please advise.       AR

## 2023-05-31 ENCOUNTER — TELEPHONE (OUTPATIENT)
Dept: PAIN MEDICINE | Facility: CLINIC | Age: 68
End: 2023-05-31
Payer: MEDICARE

## 2023-05-31 ENCOUNTER — TELEPHONE (OUTPATIENT)
Dept: INTERNAL MEDICINE | Facility: CLINIC | Age: 68
End: 2023-05-31

## 2023-05-31 ENCOUNTER — PATIENT MESSAGE (OUTPATIENT)
Dept: PAIN MEDICINE | Facility: CLINIC | Age: 68
End: 2023-05-31
Payer: MEDICARE

## 2023-05-31 DIAGNOSIS — M51.36 DDD (DEGENERATIVE DISC DISEASE), LUMBAR: ICD-10-CM

## 2023-05-31 DIAGNOSIS — M54.16 LUMBAR RADICULOPATHY: Primary | ICD-10-CM

## 2023-05-31 RX ORDER — METHOCARBAMOL 500 MG/1
TABLET, FILM COATED ORAL
Qty: 60 TABLET | Refills: 1 | Status: CANCELLED | OUTPATIENT
Start: 2023-05-31

## 2023-05-31 NOTE — TELEPHONE ENCOUNTER
----- Message from Idania Rosario sent at 5/31/2023  1:46 PM CDT -----  .Type:  RX Refill Request    Who Called: pt  Refill or New Rx:refill  RX Name and Strength:methocarbamoL (ROBAXIN) 500 MG Tab  How is the patient currently taking it? (ex. 1XDay):  Is this a 30 day or 90 day RX:  Preferred Pharmacy with phone number:[id number] - [place] - [address] AT [facility] [address]   Phone:  [phone]  Fax:  [phone]  Local or Mail Order:local  Ordering Provider:Alicia  Would the patient rather a call back or a response via [facility]? call  Best Call Back Number:[phone]  Additional Information:     Pt says that she takes 4 a day and needs more than 40

## 2023-05-31 NOTE — TELEPHONE ENCOUNTER
----- Message from Basilio Ovalles sent at 5/31/2023  3:04 PM CDT -----  .Type:  Needs Medical Advice    Who Called: Pt  Would the patient rather a call back or a response via [facility]? call  Best Call Back Number: [phone]  Additional Information:    Pt requested to know if  is supposed to fill out her FMLA forms.

## 2023-06-01 ENCOUNTER — CLINICAL SUPPORT (OUTPATIENT)
Dept: REHABILITATION | Facility: HOSPITAL | Age: 68
End: 2023-06-01
Payer: MEDICARE

## 2023-06-01 DIAGNOSIS — M53.86 DECREASED RANGE OF MOTION OF LUMBAR SPINE: ICD-10-CM

## 2023-06-01 DIAGNOSIS — R26.89 DECREASED FUNCTIONAL MOBILITY: Primary | ICD-10-CM

## 2023-06-01 DIAGNOSIS — Z74.09 IMPAIRED FUNCTIONAL MOBILITY, BALANCE, GAIT, AND ENDURANCE: ICD-10-CM

## 2023-06-01 PROCEDURE — 97110 THERAPEUTIC EXERCISES: CPT | Mod: PN

## 2023-06-01 RX ORDER — METHOCARBAMOL 500 MG/1
TABLET, FILM COATED ORAL
Qty: 60 TABLET | Refills: 1 | Status: SHIPPED | OUTPATIENT
Start: 2023-06-01 | End: 2023-08-10 | Stop reason: SDUPTHER

## 2023-06-01 NOTE — PROGRESS NOTES
[facility]   Physical Therapy Treatment Note     Name: Bhavna Landry  [facility] Number: [id number]    Therapy Diagnosis:   Encounter Diagnoses   Name Primary?    Decreased functional mobility Yes    Impaired functional mobility, balance, gait, and endurance     Decreased range of motion of lumbar spine      Visit Date: 6/1/2023    Physician: Allyson Galaviz, *     Physician Orders: PT Eval and Treat   Medical Diagnosis from Referral:   M53.3 (ICD-10-CM) - SI (sacroiliac) joint dysfunction   M47.816 (ICD-10-CM) - Spondylosis of lumbar region without myelopathy or radiculopathy      Evaluation Date: 3/9/2023  Authorization Period Expiration: 4/6/2023  Plan of Care Expiration: 5/4/2023, 6/9/2023  Progress Note Due: 6/26/2023  Visit # / Visits authorized: 14/11  FOTO: Next performed at discharge.     Precautions: Standard, asthma, smoker, osteoporosis    PTA Visit #: 0/5     Time In: 8:03  am  Time Out: 8:58 am  Total Billable Time: 30 minutes - 2 TE-- 1:1 physical therapist       SUBJECTIVE     Pt reports: Walked Tuesday evening. Went to the gym yesterday doing machines and walked. She is sore right now in her legs but her lumbosacral pain is feeling much better and less intense.    She was compliant with home exercise program.  Response to previous treatment: improved pain levels   Functional change: improved pain levels    Pain: 1/10; 0/10 end of session  Location: left and right lumbosacral region     OBJECTIVE     Objective Measures updated at progress report unless specified.       5/18/23: 7 mins  (+) SLR pain in buttocks (L)     (+) Ely's test (tight hip flexors)    Joint Mobility:   L3-L5  painful     Foot assessment:  Right foot: Hallux valgus  Left foot: Good medial longitudinal arch at baseline.  Marching: good mobility in right foot, less in left  Trunk rotation: Increased mobility in right foot, less in left    Navicular drop test:    L foot: 4.6 cm at rest, 5.2 cm with  "subtalar neutral = 0.6cm or 6 mm= neutral foot  R foot: 4.6 cm at rest, 5.5cm with subtalar neutral= 0.9cm or 9mm = neutral, almost pronated foot        Treatment     Jessica received the treatments listed below:      therapeutic exercises to develop strength, endurance, ROM, flexibility, posture, and core stabilization for 55 minutes 1:1 physical therapist including:      Reverse clamshells: 2# ankle weight - 3x10 bilateral  Cybex leg press: seat 6 - plate 5 3x10  Cybex hamstring curls: 4 plates - 3x10   Palloff press: doubled red theraband - 2x10   Side stepping with multifidi isometric ball hold: 1 kg red ball - 3 laps 10 steps each   Multifidi isometric push backs into wall: 20x5"  Left seated sciatic nerve glides: 25x   3-way swiss ball roll outs: 10x each direction    Hip flexor stretch at step: 5x20"  Sit to stands with isometric hip abduction with blue belt: 20x     manual therapy techniques: Joint mobilizations and Soft tissue Mobilization were applied for 0 minutes 1:1 physical therapist including:  Muscle energy technique to promote left piriformis activation    Patient Education and Home Exercises     Home Exercises Provided and Patient Education Provided     Education provided:   - home exercise program review  - add sink distraction stretch    Written Home Exercises Provided: Patient instructed to cont prior HEP. Exercises were reviewed and Jessica was able to demonstrate them prior to the end of the session.  Jessica demonstrated good  understanding of the education provided. See EMR under Patient Instructions for exercises provided during therapy sessions    ASSESSMENT   Bhavna is a 67 y.o. 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 Presents to session with 1/10 pain level in lumbosacral region. Could be contributed to increased " physical activity at the gym the past few days including walking and cybex resistance training. Continued with same therex secondary to positive response to previous session. Improved compliance with home exercise routine. Patient nearing discharge and has two visits remaining. Verbalized 0/10 pain end of session.    Jessica Is progressing well towards her goals.   Pt prognosis is Good.     Pt will continue to benefit from skilled outpatient physical therapy to address the deficits listed in the problem list box on initial evaluation, provide pt/family education and to maximize pt's level of independence in the home and community environment.     Pt's spiritual, cultural and educational needs considered and pt agreeable to plan of care and goals.     Anticipated barriers to physical therapy: co-morbidities    Goals:   Short Term Goals (4 Weeks):  1. Patient will be compliant with home exercise program to supplement therapy in promoting functional mobility.  2. Patient will perform transverse abdominus contraction with good control to demonstrate improved core strength.  3. Patient will report no pain during thoracolumbar active range of motion to promote functional mobility.  4. Patient will improve impaired lower extremity myotomes/manual muscle tests  to >/=4/5 to improve strength for functional tasks.  5. Patient will reports </= 3/10 pain at rest to improve quality of life.     Long Term Goals (8 Weeks):   1. Patient will improve FOTO score to </= 50% limited to decrease perceived limitation with maintaining/changing body position.   2. Patient will improve bilateral hip external/internal rotation range of motion to 30 degrees to promote functional mobility.  3. Patient will improve impaired lower extremity myotomes/manual muscle tests to >/=4+/5 to improve strength for functional tasks.  4. Patient will report no pain while walking 1 mile to return to daily walks.     PLAN   core stability program  Gluteal  activation/strengthening   Lumbar stabilization  discharge planning     Stephanie Castellanos, PT  , DPT  6/1/2023

## 2023-06-05 ENCOUNTER — CLINICAL SUPPORT (OUTPATIENT)
Dept: REHABILITATION | Facility: HOSPITAL | Age: 68
End: 2023-06-05
Payer: MEDICARE

## 2023-06-05 DIAGNOSIS — Z74.09 IMPAIRED FUNCTIONAL MOBILITY, BALANCE, GAIT, AND ENDURANCE: ICD-10-CM

## 2023-06-05 DIAGNOSIS — R26.89 DECREASED FUNCTIONAL MOBILITY: Primary | ICD-10-CM

## 2023-06-05 DIAGNOSIS — M53.86 DECREASED RANGE OF MOTION OF LUMBAR SPINE: ICD-10-CM

## 2023-06-05 PROCEDURE — 97110 THERAPEUTIC EXERCISES: CPT | Mod: PN

## 2023-06-05 NOTE — PROGRESS NOTES
[facility]   Physical Therapy Treatment Note     Name: Bhavna Landry  [facility] Number: [id number]    Therapy Diagnosis:   Encounter Diagnoses   Name Primary?    Decreased functional mobility Yes    Impaired functional mobility, balance, gait, and endurance     Decreased range of motion of lumbar spine      Visit Date: 6/5/2023    Physician: Allyson Galaviz, *     Physician Orders: PT Eval and Treat   Medical Diagnosis from Referral:   M53.3 (ICD-10-CM) - SI (sacroiliac) joint dysfunction   M47.816 (ICD-10-CM) - Spondylosis of lumbar region without myelopathy or radiculopathy      Evaluation Date: 3/9/2023  Authorization Period Expiration: 4/6/2023  Plan of Care Expiration: 5/4/2023, 6/9/2023  Progress Note Due: 6/26/2023  Visit # / Visits authorized: 15/11  FOTO: Next performed at discharge.     Precautions: Standard, asthma, smoker, osteoporosis    PTA Visit #: 0/5     Time In: 7:00 am  Time Out: 7:55 am  Total Billable Time: 30 minutes - 2 TE-- 1:1 physical therapist       SUBJECTIVE     Pt reports: Went to the gym two times over the weekend and participated in machine resistance training, walked a mile, and biked a mile.    She was compliant with home exercise program.  Response to previous treatment: mild soreness   Functional change: Going to the gym weekly, reduced pain level  Pain: 0/10   Location: left and right lumbosacral region     OBJECTIVE     Objective Measures updated at progress report unless specified.       Treatment     Jessica received the treatments listed below:      therapeutic exercises to develop strength, endurance, ROM, flexibility, posture, and core stabilization for 30 minutes 1:1 physical therapist including:      Reverse clamshells: 2# ankle weight - 3x10 bilateral  Cybex leg press: seat 6 - plate 5 3x10  Cybex hamstring curls: 4 plates - 3x10   Palloff press: doubled red theraband - 2x10   Side stepping with multifidi isometric ball hold: 1 kg red ball - 3  "laps 10 steps each   Hip flexor stretch at step: 5x20"  3-way swiss ball roll outs: 10x each direction    Step ups with contralateral dumbbell holds: 6" step with 6# - 20x each  Multifidi isometric push backs into wall: 15x5"    manual therapy techniques: Joint mobilizations and Soft tissue Mobilization were applied for 0 minutes 1:1 physical therapist including:  Muscle energy technique to promote left piriformis activation    Patient Education and Home Exercises     Home Exercises Provided and Patient Education Provided     Education provided:   - home exercise program review  - add sink distraction stretch    Written Home Exercises Provided: Patient instructed to cont prior HEP. Exercises were reviewed and Jessica was able to demonstrate them prior to the end of the session.  Jessica demonstrated good  understanding of the education provided. See EMR under Patient Instructions for exercises provided during therapy sessions    ASSESSMENT   Bhavna is a 67 y.o. 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 Presents to session with 0/10 pain since beginning gym exercises routine weekly with aerobic training. Significant improvement in function. Patient agreeable and ready for discharge next visit as long as pain remains </= 1/10 upon arrival. Will be provided with updated home exercise program focused on posterior chain, gluteal strengthening, and core stability training.    Jessica Is progressing well towards her goals.   Pt prognosis is Good.     Pt will continue to benefit from skilled outpatient physical therapy to address the deficits listed in the problem list box on initial evaluation, provide pt/family education and to maximize pt's level of independence in the home and community environment.     Pt's spiritual, cultural and educational " needs considered and pt agreeable to plan of care and goals.     Anticipated barriers to physical therapy: co-morbidities    Goals:   Short Term Goals (4 Weeks):  1. Patient will be compliant with home exercise program to supplement therapy in promoting functional mobility.  2. Patient will perform transverse abdominus contraction with good control to demonstrate improved core strength.  3. Patient will report no pain during thoracolumbar active range of motion to promote functional mobility.  4. Patient will improve impaired lower extremity myotomes/manual muscle tests  to >/=4/5 to improve strength for functional tasks.  5. Patient will reports </= 3/10 pain at rest to improve quality of life.     Long Term Goals (8 Weeks):   1. Patient will improve FOTO score to </= 50% limited to decrease perceived limitation with maintaining/changing body position.   2. Patient will improve bilateral hip external/internal rotation range of motion to 30 degrees to promote functional mobility.  3. Patient will improve impaired lower extremity myotomes/manual muscle tests to >/=4+/5 to improve strength for functional tasks.  4. Patient will report no pain while walking 1 mile to return to daily walks.     PLAN   core stability program  Gluteal activation/strengthening   Lumbar stabilization  discharge planning     Stephanie Castellanos, PT  , DPT  6/5/2023

## 2023-06-06 ENCOUNTER — OFFICE VISIT (OUTPATIENT)
Dept: INTERNAL MEDICINE | Facility: CLINIC | Age: 68
End: 2023-06-06
Payer: MEDICARE

## 2023-06-06 VITALS
BODY MASS INDEX: 17.7 KG/M2 | HEART RATE: 76 BPM | OXYGEN SATURATION: 98 % | WEIGHT: 99.88 LBS | HEIGHT: 63 IN | SYSTOLIC BLOOD PRESSURE: 116 MMHG | DIASTOLIC BLOOD PRESSURE: 62 MMHG

## 2023-06-06 DIAGNOSIS — C50.411 CARCINOMA OF UPPER-OUTER QUADRANT OF RIGHT BREAST IN FEMALE, ESTROGEN RECEPTOR POSITIVE: ICD-10-CM

## 2023-06-06 DIAGNOSIS — D50.0 IRON DEFICIENCY ANEMIA DUE TO CHRONIC BLOOD LOSS: ICD-10-CM

## 2023-06-06 DIAGNOSIS — Z17.0 CARCINOMA OF UPPER-OUTER QUADRANT OF RIGHT BREAST IN FEMALE, ESTROGEN RECEPTOR POSITIVE: ICD-10-CM

## 2023-06-06 DIAGNOSIS — J41.1 MUCOPURULENT CHRONIC BRONCHITIS: ICD-10-CM

## 2023-06-06 DIAGNOSIS — E78.5 HYPERLIPIDEMIA, UNSPECIFIED HYPERLIPIDEMIA TYPE: ICD-10-CM

## 2023-06-06 DIAGNOSIS — J32.8 OTHER CHRONIC SINUSITIS: ICD-10-CM

## 2023-06-06 DIAGNOSIS — R53.83 FATIGUE, UNSPECIFIED TYPE: ICD-10-CM

## 2023-06-06 DIAGNOSIS — I10 ESSENTIAL HYPERTENSION: Primary | Chronic | ICD-10-CM

## 2023-06-06 DIAGNOSIS — Z79.01 CHRONIC ANTICOAGULATION: ICD-10-CM

## 2023-06-06 PROBLEM — F41.9 ANXIETY: Status: RESOLVED | Noted: 2018-08-02 | Resolved: 2023-06-06

## 2023-06-06 PROBLEM — M54.50 LEFT-SIDED LOW BACK PAIN WITHOUT SCIATICA: Status: RESOLVED | Noted: 2020-06-22 | Resolved: 2023-06-06

## 2023-06-06 PROBLEM — R29.818 EXTRAPYRAMIDAL SYMPTOM: Status: RESOLVED | Noted: 2021-12-17 | Resolved: 2023-06-06

## 2023-06-06 PROBLEM — M25.559 PAIN IN UNSPECIFIED HIP: Status: RESOLVED | Noted: 2020-09-16 | Resolved: 2023-06-06

## 2023-06-06 PROBLEM — Z74.09 IMPAIRED FUNCTIONAL MOBILITY, BALANCE, GAIT, AND ENDURANCE: Status: RESOLVED | Noted: 2023-03-13 | Resolved: 2023-06-06

## 2023-06-06 PROCEDURE — 3078F PR MOST RECENT DIASTOLIC BLOOD PRESSURE < 80 MM HG: ICD-10-PCS | Mod: CPTII,S$GLB,, | Performed by: INTERNAL MEDICINE

## 2023-06-06 PROCEDURE — 3288F FALL RISK ASSESSMENT DOCD: CPT | Mod: CPTII,S$GLB,, | Performed by: INTERNAL MEDICINE

## 2023-06-06 PROCEDURE — 99999 PR PBB SHADOW E&M-EST. PATIENT-LVL III: CPT | Mod: PBBFAC,,, | Performed by: INTERNAL MEDICINE

## 2023-06-06 PROCEDURE — 1160F RVW MEDS BY RX/DR IN RCRD: CPT | Mod: CPTII,S$GLB,, | Performed by: INTERNAL MEDICINE

## 2023-06-06 PROCEDURE — 1101F PT FALLS ASSESS-DOCD LE1/YR: CPT | Mod: CPTII,S$GLB,, | Performed by: INTERNAL MEDICINE

## 2023-06-06 PROCEDURE — 1159F PR MEDICATION LIST DOCUMENTED IN MEDICAL RECORD: ICD-10-PCS | Mod: CPTII,S$GLB,, | Performed by: INTERNAL MEDICINE

## 2023-06-06 PROCEDURE — 1159F MED LIST DOCD IN RCRD: CPT | Mod: CPTII,S$GLB,, | Performed by: INTERNAL MEDICINE

## 2023-06-06 PROCEDURE — 1160F PR REVIEW ALL MEDS BY PRESCRIBER/CLIN PHARMACIST DOCUMENTED: ICD-10-PCS | Mod: CPTII,S$GLB,, | Performed by: INTERNAL MEDICINE

## 2023-06-06 PROCEDURE — 3074F SYST BP LT 130 MM HG: CPT | Mod: CPTII,S$GLB,, | Performed by: INTERNAL MEDICINE

## 2023-06-06 PROCEDURE — 99397 PR PREVENTIVE VISIT,EST,65 & OVER: ICD-10-PCS | Mod: GZ,S$GLB,, | Performed by: INTERNAL MEDICINE

## 2023-06-06 PROCEDURE — 3008F PR BODY MASS INDEX (BMI) DOCUMENTED: ICD-10-PCS | Mod: CPTII,S$GLB,, | Performed by: INTERNAL MEDICINE

## 2023-06-06 PROCEDURE — 1126F AMNT PAIN NOTED NONE PRSNT: CPT | Mod: CPTII,S$GLB,, | Performed by: INTERNAL MEDICINE

## 2023-06-06 PROCEDURE — 4010F PR ACE/ARB THEARPY RXD/TAKEN: ICD-10-PCS | Mod: CPTII,S$GLB,, | Performed by: INTERNAL MEDICINE

## 2023-06-06 PROCEDURE — 3078F DIAST BP <80 MM HG: CPT | Mod: CPTII,S$GLB,, | Performed by: INTERNAL MEDICINE

## 2023-06-06 PROCEDURE — 3008F BODY MASS INDEX DOCD: CPT | Mod: CPTII,S$GLB,, | Performed by: INTERNAL MEDICINE

## 2023-06-06 PROCEDURE — 4010F ACE/ARB THERAPY RXD/TAKEN: CPT | Mod: CPTII,S$GLB,, | Performed by: INTERNAL MEDICINE

## 2023-06-06 PROCEDURE — 3074F PR MOST RECENT SYSTOLIC BLOOD PRESSURE < 130 MM HG: ICD-10-PCS | Mod: CPTII,S$GLB,, | Performed by: INTERNAL MEDICINE

## 2023-06-06 PROCEDURE — 99397 PER PM REEVAL EST PAT 65+ YR: CPT | Mod: GZ,S$GLB,, | Performed by: INTERNAL MEDICINE

## 2023-06-06 PROCEDURE — 1126F PR PAIN SEVERITY QUANTIFIED, NO PAIN PRESENT: ICD-10-PCS | Mod: CPTII,S$GLB,, | Performed by: INTERNAL MEDICINE

## 2023-06-06 PROCEDURE — 3288F PR FALLS RISK ASSESSMENT DOCUMENTED: ICD-10-PCS | Mod: CPTII,S$GLB,, | Performed by: INTERNAL MEDICINE

## 2023-06-06 PROCEDURE — 99999 PR PBB SHADOW E&M-EST. PATIENT-LVL III: ICD-10-PCS | Mod: PBBFAC,,, | Performed by: INTERNAL MEDICINE

## 2023-06-06 PROCEDURE — 1101F PR PT FALLS ASSESS DOC 0-1 FALLS W/OUT INJ PAST YR: ICD-10-PCS | Mod: CPTII,S$GLB,, | Performed by: INTERNAL MEDICINE

## 2023-06-06 RX ORDER — BUDESONIDE AND FORMOTEROL FUMARATE DIHYDRATE 80; 4.5 UG/1; UG/1
2 AEROSOL RESPIRATORY (INHALATION) 2 TIMES DAILY
Qty: 6.9 G | Refills: 11 | Status: SHIPPED | OUTPATIENT
Start: 2023-06-06 | End: 2024-06-05

## 2023-06-06 NOTE — PATIENT INSTRUCTIONS
Dion Med Medi pot or sinus rinse.    Try Symbicort inhaler for bronchitis - 2 puffs twice a day.    Stop iron  Stop Lisinopril

## 2023-06-06 NOTE — PROGRESS NOTES
Subjective     Patient ID: Bhavna Landry is a 67 y.o. female.    Chief Complaint: Annual Exam    HPI  She is to have lumbar epidural injection soon.    S/p lumbar nerve block in early May.    Daughter needs FMLA form completed.    Br Ca - R mastectomy last May.    Facial pain finally resolved with tx of successful rhinitis.    Continues to blow out copious clear, thick mucus.     She has not tried saline irrigation.  Is using flonase daily.    Prior smoker.  Albuterol helpful.breo      Htn controlled, but she c/o low energy.    She reduced lisinopril to half a tab daily and amlodipine 5 mg.      She blames fatigue on low BP.  Bp readings on dig htn has been beautifully controlled.  Rarely take clonidine for elevated BP.    Schizophrenia, controlled.  Occ migraine.  Incont - L ankle swelling  No obvious congitive dyfunction    TONIE, taking iron.    Gastric erosions by 2/23 EGD.      Review of Systems   Constitutional:  Negative for fever and unexpected weight change.   HENT:  Positive for sinus pressure/congestion. Negative for nasal congestion and postnasal drip.    Respiratory:  Negative for chest tightness, shortness of breath and wheezing.    Cardiovascular:  Negative for chest pain and leg swelling.   Gastrointestinal:  Negative for abdominal pain, anal bleeding, constipation, diarrhea, nausea and vomiting.   Genitourinary:  Negative for dysuria and urgency.   Musculoskeletal:  Positive for arthralgias and back pain.   Integumentary:  Negative for rash.   Neurological:  Negative for headaches.   Psychiatric/Behavioral:  Negative for dysphoric mood and sleep disturbance. The patient is not nervous/anxious.         Objective     Physical Exam  Constitutional:       General: She is not in acute distress.     Appearance: She is well-developed.   HENT:      Head: Normocephalic and atraumatic.      Right Ear: External ear normal.      Left Ear: External ear normal.      Nose: Nose normal.   Eyes:      General: No  scleral icterus.        Right eye: No discharge.         Left eye: No discharge.      Conjunctiva/sclera: Conjunctivae normal.      Pupils: Pupils are equal, round, and reactive to light.   Neck:      Thyroid: No thyromegaly.      Vascular: No JVD.   Cardiovascular:      Rate and Rhythm: Normal rate and regular rhythm.      Heart sounds: Normal heart sounds. No murmur heard.    No gallop.   Pulmonary:      Effort: Pulmonary effort is normal. No respiratory distress.      Breath sounds: Normal breath sounds. No wheezing or rales.   Abdominal:      General: Bowel sounds are normal. There is no distension.      Palpations: Abdomen is soft. There is no mass.      Tenderness: There is no abdominal tenderness. There is no guarding or rebound.   Musculoskeletal:         General: No tenderness. Normal range of motion.      Cervical back: Normal range of motion and neck supple.   Lymphadenopathy:      Cervical: No cervical adenopathy.   Skin:     General: Skin is warm and dry.      Findings: No rash.   Neurological:      Mental Status: She is alert and oriented to person, place, and time.      Cranial Nerves: No cranial nerve deficit.      Coordination: Coordination normal.   Psychiatric:         Behavior: Behavior normal.         Thought Content: Thought content normal.         Judgment: Judgment normal.     104/54     Results for orders placed or performed in visit on 05/24/23   LIPID PANEL   Result Value Ref Range    Cholesterol 141 120 - 199 mg/dL    Triglycerides 45 30 - 150 mg/dL    HDL 78 (H) 40 - 75 mg/dL    LDL Cholesterol 54.0 (L) 63.0 - 159.0 mg/dL    HDL/Cholesterol Ratio 55.3 (H) 20.0 - 50.0 %    Total Cholesterol/HDL Ratio 1.8 (L) 2.0 - 5.0    Non-HDL Cholesterol 63 mg/dL   CBC W/ AUTO DIFFERENTIAL   Result Value Ref Range    WBC 7.97 3.90 - 12.70 K/uL    RBC 4.29 4.00 - 5.40 M/uL    Hemoglobin 13.4 12.0 - 16.0 g/dL    Hematocrit 41.6 37.0 - 48.5 %    MCV 97 82 - 98 fL    MCH 31.2 (H) 27.0 - 31.0 pg    MCHC  32.2 32.0 - 36.0 g/dL    RDW 15.9 (H) 11.5 - 14.5 %    Platelets 392 150 - 450 K/uL    MPV 8.6 (L) 9.2 - 12.9 fL    Immature Granulocytes 0.3 0.0 - 0.5 %    Gran # (ANC) 5.5 1.8 - 7.7 K/uL    Immature Grans (Abs) 0.02 0.00 - 0.04 K/uL    Lymph # 1.8 1.0 - 4.8 K/uL    Mono # 0.4 0.3 - 1.0 K/uL    Eos # 0.2 0.0 - 0.5 K/uL    Baso # 0.06 0.00 - 0.20 K/uL    nRBC 0 0 /100 WBC    Gran % 69.1 38.0 - 73.0 %    Lymph % 22.5 18.0 - 48.0 %    Mono % 4.5 4.0 - 15.0 %    Eosinophil % 2.8 0.0 - 8.0 %    Basophil % 0.8 0.0 - 1.9 %    Differential Method Automated    Iron and TIBC   Result Value Ref Range    Iron 119 30 - 160 ug/dL    Transferrin 280 200 - 375 mg/dL    TIBC 414 250 - 450 ug/dL    Saturated Iron 29 20 - 50 %   Ferritin   Result Value Ref Range    Ferritin 37 20.0 - 300.0 ng/mL     *Note: Due to a large number of results and/or encounters for the requested time period, some results have not been displayed. A complete set of results can be found in Results Review.      Assessment and Plan     1. Essential hypertension    2. Hyperlipidemia, unspecified hyperlipidemia type    3. Chronic anticoagulation    4. Mucopurulent chronic bronchitis    5. 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, estrogen receptor positive    6. Iron deficiency anemia due to chronic blood loss  -     Iron and TIBC; Future; Expected date: 06/06/2023  -     Ferritin; Future; Expected date: 06/06/2023    7. Other chronic sinusitis    8. Fatigue, unspecified type    Other orders  -     budesonide-formoterol 80-4.5 mcg (SYMBICORT) 80-4.5 mcg/actuation HFAA; Inhale 2 puffs into the lungs 2 (two) times a day.  Dispense: 6.9 g; Refill: 11             OK to stop iron.  Repeat cbc , iron 2 months  Sinus irrigatoin  Start symbicort  Stop lisinopril    CC pharmacist  Follow up in about 1 year (around 6/6/2024) for PE.

## 2023-06-08 ENCOUNTER — TELEPHONE (OUTPATIENT)
Dept: INTERNAL MEDICINE | Facility: CLINIC | Age: 68
End: 2023-06-08
Payer: MEDICARE

## 2023-06-08 ENCOUNTER — OFFICE VISIT (OUTPATIENT)
Dept: GASTROENTEROLOGY | Facility: CLINIC | Age: 68
End: 2023-06-08
Payer: MEDICARE

## 2023-06-08 VITALS — HEIGHT: 63 IN | WEIGHT: 100.5 LBS | BODY MASS INDEX: 17.81 KG/M2

## 2023-06-08 DIAGNOSIS — K31.89 EROSIVE GASTROPATHY: ICD-10-CM

## 2023-06-08 DIAGNOSIS — K31.89 NSAID-ASSOCIATED GASTROPATHY: Primary | ICD-10-CM

## 2023-06-08 DIAGNOSIS — T39.395A NSAID-ASSOCIATED GASTROPATHY: Primary | ICD-10-CM

## 2023-06-08 DIAGNOSIS — K21.9 GASTROESOPHAGEAL REFLUX DISEASE, UNSPECIFIED WHETHER ESOPHAGITIS PRESENT: ICD-10-CM

## 2023-06-08 PROCEDURE — 1126F AMNT PAIN NOTED NONE PRSNT: CPT | Mod: CPTII,S$GLB,, | Performed by: INTERNAL MEDICINE

## 2023-06-08 PROCEDURE — 3288F FALL RISK ASSESSMENT DOCD: CPT | Mod: CPTII,S$GLB,, | Performed by: INTERNAL MEDICINE

## 2023-06-08 PROCEDURE — 4010F PR ACE/ARB THEARPY RXD/TAKEN: ICD-10-PCS | Mod: CPTII,S$GLB,, | Performed by: INTERNAL MEDICINE

## 2023-06-08 PROCEDURE — 3008F PR BODY MASS INDEX (BMI) DOCUMENTED: ICD-10-PCS | Mod: CPTII,S$GLB,, | Performed by: INTERNAL MEDICINE

## 2023-06-08 PROCEDURE — 1101F PT FALLS ASSESS-DOCD LE1/YR: CPT | Mod: CPTII,S$GLB,, | Performed by: INTERNAL MEDICINE

## 2023-06-08 PROCEDURE — 3288F PR FALLS RISK ASSESSMENT DOCUMENTED: ICD-10-PCS | Mod: CPTII,S$GLB,, | Performed by: INTERNAL MEDICINE

## 2023-06-08 PROCEDURE — 99999 PR PBB SHADOW E&M-EST. PATIENT-LVL II: CPT | Mod: PBBFAC,,, | Performed by: INTERNAL MEDICINE

## 2023-06-08 PROCEDURE — 1101F PR PT FALLS ASSESS DOC 0-1 FALLS W/OUT INJ PAST YR: ICD-10-PCS | Mod: CPTII,S$GLB,, | Performed by: INTERNAL MEDICINE

## 2023-06-08 PROCEDURE — 1159F MED LIST DOCD IN RCRD: CPT | Mod: CPTII,S$GLB,, | Performed by: INTERNAL MEDICINE

## 2023-06-08 PROCEDURE — 1126F PR PAIN SEVERITY QUANTIFIED, NO PAIN PRESENT: ICD-10-PCS | Mod: CPTII,S$GLB,, | Performed by: INTERNAL MEDICINE

## 2023-06-08 PROCEDURE — 99214 PR OFFICE/OUTPT VISIT, EST, LEVL IV, 30-39 MIN: ICD-10-PCS | Mod: S$GLB,,, | Performed by: INTERNAL MEDICINE

## 2023-06-08 PROCEDURE — 3008F BODY MASS INDEX DOCD: CPT | Mod: CPTII,S$GLB,, | Performed by: INTERNAL MEDICINE

## 2023-06-08 PROCEDURE — 99214 OFFICE O/P EST MOD 30 MIN: CPT | Mod: S$GLB,,, | Performed by: INTERNAL MEDICINE

## 2023-06-08 PROCEDURE — 99999 PR PBB SHADOW E&M-EST. PATIENT-LVL II: ICD-10-PCS | Mod: PBBFAC,,, | Performed by: INTERNAL MEDICINE

## 2023-06-08 PROCEDURE — 1159F PR MEDICATION LIST DOCUMENTED IN MEDICAL RECORD: ICD-10-PCS | Mod: CPTII,S$GLB,, | Performed by: INTERNAL MEDICINE

## 2023-06-08 PROCEDURE — 4010F ACE/ARB THERAPY RXD/TAKEN: CPT | Mod: CPTII,S$GLB,, | Performed by: INTERNAL MEDICINE

## 2023-06-08 RX ORDER — OMEPRAZOLE 20 MG/1
20 CAPSULE, DELAYED RELEASE ORAL DAILY
Qty: 30 CAPSULE | Refills: 11 | Status: SHIPPED | OUTPATIENT
Start: 2023-06-08 | End: 2023-09-19

## 2023-06-08 NOTE — TELEPHONE ENCOUNTER
Returned pt call/ Pt states she started yesterday with yellow/green discharge from nose. Pt also c/o sore throat and clear thick productive cough . Pt denies fever. Pt states she just saw PCP on June 6. Pt states she has used Mucinex for 1 week without improvement. Pt asking for prescription.    Pt also requesting PCP fill out FMLA form for her daughter so she can stay home from work to care for her. Pt states she spoke with PCP about this at her appt. Explained pt would need to being in form to be filled out.

## 2023-06-08 NOTE — TELEPHONE ENCOUNTER
----- Message from Maru Guzman sent at 6/8/2023 11:00 AM CDT -----  Contact: Pt Mobile [phone]  Patient said that when she blow her nose a thick greenish, yellowish colored mucus comes out and she said that she's losing her voice and this all started on yesterday.

## 2023-06-08 NOTE — TELEPHONE ENCOUNTER
----- Message from Maru Guzman sent at 6/7/2023  7:52 AM CDT -----  Contact: Pt Mobile [phone]  Patient would like for you to fill out a FMLA form for a year for her please.

## 2023-06-08 NOTE — TELEPHONE ENCOUNTER
Relayed message from PCP:    SHe needs to wait 10 days  before taking antibiotics.  She should also use albuterol inhaler for chest congestion  Irrigate nose with saline - as we discussed last visit  She has been on a lot of abx lately - we need to be careful about use to minimize development of resistance.    Pt verbalized understanding. Pt states she will reach out should she need anything further.

## 2023-06-08 NOTE — TELEPHONE ENCOUNTER
SHe needs to wait 10 days  before taking antibiotics.  She should also use albuterol inhaler for chest congestion  Irrigate nose with saline - as we discussed last visit  She has been on a lot of abx lately - we need to be careful about use to minimize development of resistance.

## 2023-06-08 NOTE — LETTER
June 8, 2023        Sadaf Vargas MD  [address]  [facility] [address]             [facility]  [address]  [facility] [address]  Phone: [phone]   Patient: Bhavna Landry   MR Number: [id number]   YOB: 1955   Date of Visit: 6/8/2023       Dear Dr. Vargas:    Thank you for referring Bhavna Landry to me for evaluation. Below are the relevant portions of my assessment and plan of care.            If you have questions, please do not hesitate to call me. I look forward to following Bhavna along with you.    Sincerely,      Dougie Shea MD           CC    No Recipients

## 2023-06-08 NOTE — PROGRESS NOTES
GASTROENTEROLOGY CLINIC NOTE    Reason for visit: The primary encounter diagnosis was NSAID-associated gastropathy. Diagnoses of Gastroesophageal reflux disease, unspecified whether esophagitis present and Erosive gastropathy were also pertinent to this visit.  Referring provider/PCP: Sadaf Vargas MD    HPI:  Bhavna Landry is a 67 y.o. female here today for iron def anemia follow up    Here for iron def anemia followup  Had egd with me, erosive gastropathy.  Was taking heavy amounts of Aleve at that time.  She is since stopped that and is only taking it sparingly as needed for significant pain.  She is following with pain management.  She was taking iron a but until her recent blood counts have completely normalized with completely normal iron studies she is not taking anymore iron.  She did not tolerate the Protonix, it seemingly gave her woozy feeling.  She is now taking omeprazole 20 daily without complication.  No blood in stool    Prior Endoscopy:  EGD:  2/2023 with me  Impression:            - Normal esophagus.                          - Erosive gastropathy with no bleeding and no                          stigmata of recent bleeding. Biopsied.                          - Normal examined duodenum. Biopsied.   Recommendation:        - Discharge patient to home.                          - Patient has a contact number available for                          emergencies. The signs and symptoms of potential                          delayed complications were discussed with the                          patient. Return to normal activities tomorrow.                          Written discharge instructions were provided to                          the patient.                          - Resume previous diet.                          - Continue present medications.                          - Await pathology results.                          - Await path, mild erosive gastropathy may be                           causing iron def, need to reduce Aleve and other                          NSAIDs use. Consider video capsule down the road.                          Start protonix daily after pathology results.   PATH - negative HP    Colon:  2022 with alston  7 polyps, path is serrated polyps  Repeat 3 years    (Portions of this note were dictated using voice recognition software and may contain dictation related errors in spelling/grammar/syntax not found on text review)    Review of Systems   Constitutional:  Negative for fever and malaise/fatigue.   Respiratory:  Negative for cough and shortness of breath.    Cardiovascular:  Negative for chest pain and palpitations.   Gastrointestinal:  Negative for abdominal pain, blood in stool, nausea and vomiting.   Musculoskeletal:  Positive for back pain and myalgias.   Neurological:  Negative for dizziness and headaches.     Past Medical History: has a past medical history of Allergy, Amblyopia, Anemia, Anticoagulant long-term use, Arthritis, 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, estrogen receptor positive, Cataract, Depression, Dry eyes, Dry mouth, Duane's syndrome of right eye, Early dry stage nonexudative age-related macular degeneration of both eyes, Fibromyalgia, Fibromyalgia, Fractured hip, GERD (gastroesophageal reflux disease), History of psychiatric hospitalization, Hyperlipidemia, Hypertension, Hypocalcemia, Hyponatremia, Kidney stone, Migraine headache, Osteoporosis, Pressure ulcer of unspecified site, unspecified stage, Psoriatic arthritis, Right knee pain, RLS (restless legs syndrome), Schizophrenia, Sciatica, Squamous cell carcinoma of skin, and Urinary tract infection.    Past Surgical History: has a past surgical history that includes Surgery on right knee (1982); cyst removed from right sinus (1982); tumor removed from back left side upper shoulder (2006); Hysterectomy; Knee surgery;  section; Joint replacement (Right);  Colonoscopy (N/A, 5/30/2016); Injection of anesthetic agent into sacroiliac joint (Right, 8/30/2018); Injection of anesthetic agent into sacroiliac joint (Bilateral, 9/27/2018); Injection of anesthetic agent into sacroiliac joint (Bilateral, 2/7/2019); brow ptosis repair (Right, 03/04/2019); Injection of anesthetic agent around medial branch nerves innervating lumbar facet joint (N/A, 4/11/2019); Radiofrequency thermocoagulation (Right, 5/7/2019); Radiofrequency thermocoagulation (Left, 5/14/2019); Phacoemulsification of cataract (Right, 8/1/2019); Intraocular prosthesis insertion (Right, 8/1/2019); Cataract extraction; Phacoemulsification of cataract (Left, 9/26/2019); Intraocular prosthesis insertion (Left, 9/26/2019); Radiofrequency thermocoagulation (Right, 10/22/2019); Radiofrequency thermocoagulation (Left, 10/29/2019); Radiofrequency thermocoagulation (Left, 5/26/2020); Radiofrequency thermocoagulation (Right, 6/2/2020); Mastectomy (Right, 5/9/2022); [place] lymph node biopsy (Right, 5/9/2022); Injection for sentinel node identification (Right, 5/9/2022); Colonoscopy (N/A, 12/9/2022); Esophagogastroduodenoscopy (N/A, 2/7/2023); and Injection of anesthetic agent around medial branch nerves innervating lumbar facet joint (Bilateral, 5/3/2023).    Family History:family history includes Arthritis in her brother; Cancer in her father and mother; Colon cancer in her mother; Depression in her mother; Diabetes in her brother; Heart disease in her brother; No Known Problems in her daughter; Psoriasis in her mother; Stroke in her sister.    Allergies:   Review of patient's allergies indicates:   Allergen Reactions    Etanercept Other (See Comments)     Other reaction(s): recurrent infections    Chloramphenicol sod succinate Hives    Codeine Other (See Comments)     Other reaction(s): Stomach upset. Pt states OK with Percocet    Nickel sutures [surgical stainless steel] Dermatitis     Allergic contact dermatitis     Adhesive Rash       Social History: reports that she has been smoking cigarettes. She has a 2.50 pack-year smoking history. She has never been exposed to tobacco smoke. She has quit using smokeless tobacco. She reports that she does not drink alcohol and does not use drugs.    Home medications:   Current Outpatient Medications on File Prior to Visit   Medication Sig Dispense Refill    albuterol (PROAIR HFA) 90 mcg/actuation inhaler 2 puffs qid prn 54 g 3    amLODIPine (NORVASC) 5 MG tablet Take 1 tablet (5 mg total) by mouth every evening. 90 tablet 1    ascorbic acid, vitamin C, (VITAMIN C) 500 MG tablet Take 500 mg by mouth once daily.      aspirin (ECOTRIN) 81 MG EC tablet Take 81 mg by mouth once daily.      atorvastatin (LIPITOR) 80 MG tablet Take 1 tablet (80 mg total) by mouth once daily. 90 tablet 3    benztropine (COGENTIN) 0.5 MG tablet Take 1 tablet (0.5 mg total) by mouth 2 (two) times daily. 180 tablet 3    budesonide-formoterol 80-4.5 mcg (SYMBICORT) 80-4.5 mcg/actuation HFAA Inhale 2 puffs into the lungs 2 (two) times a day. 6.9 g 11    cloNIDine (CATAPRES) 0.1 MG tablet 1 tab po q day for systolic BP > 160 or DBP >100. 30 tablet 1    cyanocobalamin 500 MCG tablet Take 500 mcg by mouth once daily.      diclofenac sodium (VOLTAREN) 1 % Gel Apply 2 g topically once daily. 200 g 0    exemestane (AROMASIN) 25 mg tablet Take 1 tablet (25 mg total) by mouth once daily. 30 tablet 3    fluticasone (VERAMYST) 27.5 mcg/actuation nasal spray 2 sprays by Nasal route as needed for Rhinitis.      folic acid (FOLVITE) 1 MG tablet Take 1 tablet (1,000 mcg total) by mouth once daily. 90 tablet 3    gabapentin (NEURONTIN) 100 MG capsule Take 1 capsule (100 mg total) by mouth 2 (two) times daily as needed (anxiety). 180 capsule 3    gabapentin (NEURONTIN) 300 MG capsule Take 1 capsule (300 mg total) by mouth every evening. 90 capsule 3    LUTEIN ORAL Take by mouth.      methocarbamoL (ROBAXIN) 500 MG Tab 1-2 tabs po q  "12 h prn muscle cramps 60 tablet 1    methotrexate 2.5 MG Tab Take 8 tablets (20 mg total) by mouth every 7 days. Take 4 tabs Mon am and 4 tabs Mon pm only 96 tablet 0    multivitamin (THERAGRAN) per tablet Take 1 tablet by mouth once daily.      omega-3 fatty acids/fish oil (FISH OIL-OMEGA-3 FATTY ACIDS) 300-1,000 mg capsule Take by mouth once daily.      risperiDONE (RISPERDAL) 2 MG tablet TAKE 1 TABLET(2 MG) BY MOUTH EVERY MORNING 90 tablet 3    risperiDONE (RISPERDAL) 4 MG tablet Take 1 tablet (4 mg total) by mouth every evening. 90 tablet 3    traMADoL (ULTRAM) 50 mg tablet Take 1 tablet (50 mg total) by mouth every 6 (six) hours as needed for Pain. 30 each 0    venlafaxine (EFFEXOR-XR) 75 MG 24 hr capsule Take 1 capsule (75 mg total) by mouth once daily. 90 capsule 3    VITAMIN A ORAL Take by mouth.      vitamin D (VITAMIN D3) 1000 units Tab Take 1,000 Units by mouth once daily.      vitamin E 200 UNIT capsule Take 200 Units by mouth once daily.      zinc gluconate 50 mg tablet Take 50 mg by mouth once daily.      [DISCONTINUED] omeprazole (PRILOSEC) 20 MG capsule Take 1 capsule (20 mg total) by mouth once daily. 30 capsule 2    isosorbide mononitrate (IMDUR) 30 MG 24 hr tablet TAKE 1 TABLET(30 MG) BY MOUTH EVERY DAY (Patient not taking: Reported on 6/8/2023) 30 tablet 11     Current Facility-Administered Medications on File Prior to Visit   Medication Dose Route Frequency Provider Last Rate Last Admin    tropicamide 1% ophthalmic solution 1 drop  1 drop Right Eye On Call Procedure Karen Song MD   1 drop at 08/01/19 0648       Vital signs:  Ht 5' 3" (1.6 m)   Wt 45.6 kg (100 lb 8.5 oz)   BMI 17.81 kg/m²     Physical Exam  Vitals reviewed.   Constitutional:       General: She is not in acute distress.  HENT:      Head: Normocephalic and atraumatic.   Eyes:      General: No scleral icterus.     Conjunctiva/sclera: Conjunctivae normal.   Skin:     General: Skin is warm.      Coloration: Skin is not pale. "      Findings: No rash.   Neurological:      Mental Status: She is oriented to person, place, and time.      Gait: Gait normal.   Psychiatric:         Mood and Affect: Mood normal.         Behavior: Behavior normal.       I have reviewed prior labs, imaging, notes from last month    Assessment:  1. NSAID-associated gastropathy    2. Gastroesophageal reflux disease, unspecified whether esophagitis present    3. Erosive gastropathy        Plan:  Orders Placed This Encounter    omeprazole (PRILOSEC) 20 MG capsule       I would favor continuing omeprazole 20 indefinitely given her still sparingly NSAID use  We discussed avoiding , as possible ,as much NSAIDs as we can    Repeat cbc and iron based on PCP recs.    Repeat colonoscopy for polyps in about 3 years      RTC prn    Dougie Shea MD  [facility] [place]

## 2023-06-09 ENCOUNTER — CLINICAL SUPPORT (OUTPATIENT)
Dept: REHABILITATION | Facility: HOSPITAL | Age: 68
End: 2023-06-09
Payer: MEDICARE

## 2023-06-09 DIAGNOSIS — M53.86 DECREASED RANGE OF MOTION OF LUMBAR SPINE: ICD-10-CM

## 2023-06-09 DIAGNOSIS — R26.89 DECREASED FUNCTIONAL MOBILITY: Primary | ICD-10-CM

## 2023-06-09 PROCEDURE — 97110 THERAPEUTIC EXERCISES: CPT | Mod: PN

## 2023-06-09 NOTE — PROGRESS NOTES
"[facility]   Physical Therapy Treatment and Discharge Note     Name: Bhavna Landry  Clinic Number: [id number]    Therapy Diagnosis:   Encounter Diagnoses   Name Primary?    Decreased functional mobility Yes    Decreased range of motion of lumbar spine      Visit Date: 6/9/2023    Physician: Allyson Galaviz, *     Physician Orders: PT Eval and Treat   Medical Diagnosis from Referral:   M53.3 (ICD-10-CM) - SI (sacroiliac) joint dysfunction   M47.816 (ICD-10-CM) - Spondylosis of lumbar region without myelopathy or radiculopathy      Evaluation Date: 3/9/2023  Authorization Period Expiration: 4/6/2023  Plan of Care Expiration: 5/4/2023, 6/9/2023  Progress Note Due: 6/26/2023  Visit # / Visits authorized: 18/11  FOTO: Done today for d/c     Precautions: Standard, asthma, smoker, osteoporosis    PTA Visit #: 0/5     Time In: 8:00 am  Time Out: 8:30 am  Total Billable Time: 15 minutes (1 TE)     SUBJECTIVE     Pt reports: going to the gym regularly and feeling good about today being her last for therapy.     She was compliant with home exercise program.  Response to previous treatment: mild soreness   Functional change: Going to the gym weekly, reduced pain level  Pain: 0/10   Location: left and right lumbosacral region     OBJECTIVE     Objective Measures updated at progress report unless specified.       Treatment     Jessica received the treatments listed below:      therapeutic exercises to develop strength, endurance, ROM, flexibility, posture, and core stabilization for 30 minutes 1:1 physical therapist including:    Measurements and FOTO for d/c today.    Not today  Reverse clamshells: 2# ankle weight - 3x10 bilateral  Cybex leg press: seat 6 - plate 5 3x10  Cybex hamstring curls: 4 plates - 3x10   Palloff press: doubled red theraband - 2x10   Side stepping with multifidi isometric ball hold: 1 kg red ball - 3 laps 10 steps each   Hip flexor stretch at step: 5x20"  3-way swiss ball " "roll outs: 10x each direction    Step ups with contralateral dumbbell holds: 6" step with 6# - 20x each  Multifidi isometric push backs into wall: 15x5"    manual therapy techniques: Joint mobilizations and Soft tissue Mobilization were applied for 0 minutes 1:1 physical therapist including:  Muscle energy technique to promote left piriformis activation    Patient Education and Home Exercises     Home Exercises Provided and Patient Education Provided     Education provided:   - home exercise program review  - add sink distraction stretch    Written Home Exercises Provided: Patient instructed to cont prior HEP. Exercises were reviewed and Jessica was able to demonstrate them prior to the end of the session.  Jessica demonstrated good  understanding of the education provided. See EMR under Patient Instructions for exercises provided during therapy sessions    ASSESSMENT   Bhavna is a 67 y.o. 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 She has met all short and long term goals, and has already returned to the gym. BLE strength measures 4+/5 Bilateral, full lumabr range of motion, and no pain reported. Pt discharged from PT and given updated home exercise program.       Jessica Is progressing well towards her goals.   Pt prognosis is Good.     Pt will continue to benefit from skilled outpatient physical therapy to address the deficits listed in the problem list box on initial evaluation, provide pt/family education and to maximize pt's level of independence in the home and community environment.     Pt's spiritual, cultural and educational needs considered and pt agreeable to plan of care and goals.     Anticipated barriers to physical therapy: co-morbidities    Goals:   Short Term Goals (4 Weeks):  1. Patient will be compliant with home exercise " program to supplement therapy in promoting functional mobility. - Met  2. Patient will perform transverse abdominus contraction with good control to demonstrate improved core strength. - Met  3. Patient will report no pain during thoracolumbar active range of motion to promote functional mobility. - Met  4. Patient will improve impaired lower extremity myotomes/manual muscle tests  to >/=4/5 to improve strength for functional tasks.- MET  5. Patient will reports </= 3/10 pain at rest to improve quality of life. - Met     Long Term Goals (8 Weeks):   1. Patient will improve FOTO score to </= 50% limited to decrease perceived limitation with maintaining/changing body position. - Met  2. Patient will improve bilateral hip external/internal rotation range of motion to 30 degrees to promote functional mobility. - Met  3. Patient will improve impaired lower extremity myotomes/manual muscle tests to >/=4+/5 to improve strength for functional tasks. - Met  4. Patient will report no pain while walking 1 mile to return to daily walks. - Met     PLAN   Pt discharged from PT.    Cal Arredondo, PT  , DPT  6/9/2023                                       08-Jan-2022 14:06

## 2023-06-21 ENCOUNTER — PATIENT MESSAGE (OUTPATIENT)
Dept: PAIN MEDICINE | Facility: CLINIC | Age: 68
End: 2023-06-21
Payer: MEDICARE

## 2023-06-21 ENCOUNTER — TELEPHONE (OUTPATIENT)
Dept: HEMATOLOGY/ONCOLOGY | Facility: CLINIC | Age: 68
End: 2023-06-21
Payer: MEDICARE

## 2023-06-21 NOTE — TELEPHONE ENCOUNTER
----- Message from Migdalia Davis sent at 6/21/2023  9:36 AM CDT -----  Regarding: bone density orders  Patient needs new orders for bone density  Call back number: [phone]  Additional Information:

## 2023-06-23 ENCOUNTER — LAB VISIT (OUTPATIENT)
Dept: LAB | Facility: HOSPITAL | Age: 68
End: 2023-06-23
Attending: INTERNAL MEDICINE
Payer: MEDICARE

## 2023-06-23 DIAGNOSIS — L40.50 PSA (PSORIATIC ARTHRITIS): ICD-10-CM

## 2023-06-23 LAB
ALBUMIN SERPL BCP-MCNC: 3.7 G/DL (ref 3.5–5.2)
ALP SERPL-CCNC: 69 U/L (ref 55–135)
ALT SERPL W/O P-5'-P-CCNC: 12 U/L (ref 10–44)
ANION GAP SERPL CALC-SCNC: 8 MMOL/L (ref 8–16)
AST SERPL-CCNC: 20 U/L (ref 10–40)
BASOPHILS # BLD AUTO: 0.06 K/UL (ref 0–0.2)
BASOPHILS NFR BLD: 0.7 % (ref 0–1.9)
BILIRUB SERPL-MCNC: 0.3 MG/DL (ref 0.1–1)
BUN SERPL-MCNC: 4 MG/DL (ref 8–23)
CALCIUM SERPL-MCNC: 9.2 MG/DL (ref 8.7–10.5)
CHLORIDE SERPL-SCNC: 99 MMOL/L (ref 95–110)
CO2 SERPL-SCNC: 27 MMOL/L (ref 23–29)
CREAT SERPL-MCNC: 0.7 MG/DL (ref 0.5–1.4)
CRP SERPL-MCNC: 0.5 MG/L (ref 0–8.2)
DIFFERENTIAL METHOD: ABNORMAL
EOSINOPHIL # BLD AUTO: 0.2 K/UL (ref 0–0.5)
EOSINOPHIL NFR BLD: 2.6 % (ref 0–8)
ERYTHROCYTE [DISTWIDTH] IN BLOOD BY AUTOMATED COUNT: 15.2 % (ref 11.5–14.5)
ERYTHROCYTE [SEDIMENTATION RATE] IN BLOOD BY PHOTOMETRIC METHOD: 6 MM/HR (ref 0–36)
EST. GFR  (NO RACE VARIABLE): >60 ML/MIN/1.73 M^2
GLUCOSE SERPL-MCNC: 87 MG/DL (ref 70–110)
HCT VFR BLD AUTO: 37.6 % (ref 37–48.5)
HGB BLD-MCNC: 12.1 G/DL (ref 12–16)
IMM GRANULOCYTES # BLD AUTO: 0.03 K/UL (ref 0–0.04)
IMM GRANULOCYTES NFR BLD AUTO: 0.4 % (ref 0–0.5)
LYMPHOCYTES # BLD AUTO: 2.1 K/UL (ref 1–4.8)
LYMPHOCYTES NFR BLD: 24.2 % (ref 18–48)
MCH RBC QN AUTO: 31.6 PG (ref 27–31)
MCHC RBC AUTO-ENTMCNC: 32.2 G/DL (ref 32–36)
MCV RBC AUTO: 98 FL (ref 82–98)
MONOCYTES # BLD AUTO: 0.4 K/UL (ref 0.3–1)
MONOCYTES NFR BLD: 4.2 % (ref 4–15)
NEUTROPHILS # BLD AUTO: 5.8 K/UL (ref 1.8–7.7)
NEUTROPHILS NFR BLD: 67.9 % (ref 38–73)
NRBC BLD-RTO: 0 /100 WBC
PLATELET # BLD AUTO: 341 K/UL (ref 150–450)
PMV BLD AUTO: 8.6 FL (ref 9.2–12.9)
POTASSIUM SERPL-SCNC: 4.4 MMOL/L (ref 3.5–5.1)
PROT SERPL-MCNC: 6.1 G/DL (ref 6–8.4)
RBC # BLD AUTO: 3.83 M/UL (ref 4–5.4)
SODIUM SERPL-SCNC: 134 MMOL/L (ref 136–145)
WBC # BLD AUTO: 8.55 K/UL (ref 3.9–12.7)

## 2023-06-23 PROCEDURE — 86140 C-REACTIVE PROTEIN: CPT | Performed by: INTERNAL MEDICINE

## 2023-06-23 PROCEDURE — 36415 COLL VENOUS BLD VENIPUNCTURE: CPT | Mod: PO | Performed by: INTERNAL MEDICINE

## 2023-06-23 PROCEDURE — 85652 RBC SED RATE AUTOMATED: CPT | Performed by: INTERNAL MEDICINE

## 2023-06-23 PROCEDURE — 80053 COMPREHEN METABOLIC PANEL: CPT | Performed by: INTERNAL MEDICINE

## 2023-06-23 PROCEDURE — 85025 COMPLETE CBC W/AUTO DIFF WBC: CPT | Performed by: INTERNAL MEDICINE

## 2023-06-26 ENCOUNTER — TELEPHONE (OUTPATIENT)
Dept: HEMATOLOGY/ONCOLOGY | Facility: CLINIC | Age: 68
End: 2023-06-26
Payer: MEDICARE

## 2023-06-26 NOTE — TELEPHONE ENCOUNTER
----- Message from Re Adan sent at 6/26/2023  8:40 AM CDT -----  .Type:  Needs Medical Advice    Who Called: pt    Would the patient rather a call back or a response via [facility]? Call back  Best Call Back Number: [phone]  Additional Information:     Pt would like a call back concerning a bone density test referral

## 2023-06-29 ENCOUNTER — DOCUMENTATION ONLY (OUTPATIENT)
Dept: INTERNAL MEDICINE | Facility: CLINIC | Age: 68
End: 2023-06-29
Payer: MEDICARE

## 2023-07-06 ENCOUNTER — TELEPHONE (OUTPATIENT)
Dept: INTERNAL MEDICINE | Facility: CLINIC | Age: 68
End: 2023-07-06
Payer: MEDICARE

## 2023-07-06 NOTE — TELEPHONE ENCOUNTER
----- Message from Neela Pabon sent at 7/6/2023  2:08 PM CDT -----  Contact: [phone]  112/57 bp      Ms lópez is calling back stating green/yellow snot, coughing, possibly sinus/allergy    Please call and advise @ # [phone]    [id number] - [place] - [address] AT [facility] [place]  [address] [place] [address]  Phone: [phone] Fax: [phone]

## 2023-07-11 ENCOUNTER — OFFICE VISIT (OUTPATIENT)
Dept: OTOLARYNGOLOGY | Facility: CLINIC | Age: 68
End: 2023-07-11
Payer: MEDICARE

## 2023-07-11 ENCOUNTER — PATIENT MESSAGE (OUTPATIENT)
Dept: INTERNAL MEDICINE | Facility: CLINIC | Age: 68
End: 2023-07-11
Payer: MEDICARE

## 2023-07-11 VITALS
HEART RATE: 61 BPM | WEIGHT: 98.56 LBS | SYSTOLIC BLOOD PRESSURE: 135 MMHG | BODY MASS INDEX: 17.46 KG/M2 | DIASTOLIC BLOOD PRESSURE: 64 MMHG

## 2023-07-11 DIAGNOSIS — C50.411 CARCINOMA OF UPPER-OUTER QUADRANT OF RIGHT BREAST IN FEMALE, ESTROGEN RECEPTOR POSITIVE: ICD-10-CM

## 2023-07-11 DIAGNOSIS — J32.0 CHRONIC MAXILLARY SINUSITIS: ICD-10-CM

## 2023-07-11 DIAGNOSIS — Z17.0 CARCINOMA OF UPPER-OUTER QUADRANT OF RIGHT BREAST IN FEMALE, ESTROGEN RECEPTOR POSITIVE: ICD-10-CM

## 2023-07-11 DIAGNOSIS — J31.0 CHRONIC RHINITIS: Primary | ICD-10-CM

## 2023-07-11 DIAGNOSIS — H68.003 SALPINGITIS OF BOTH EUSTACHIAN TUBES: ICD-10-CM

## 2023-07-11 PROCEDURE — 3078F PR MOST RECENT DIASTOLIC BLOOD PRESSURE < 80 MM HG: ICD-10-PCS | Mod: CPTII,S$GLB,, | Performed by: OTOLARYNGOLOGY

## 2023-07-11 PROCEDURE — 1160F PR REVIEW ALL MEDS BY PRESCRIBER/CLIN PHARMACIST DOCUMENTED: ICD-10-PCS | Mod: CPTII,S$GLB,, | Performed by: OTOLARYNGOLOGY

## 2023-07-11 PROCEDURE — 3008F BODY MASS INDEX DOCD: CPT | Mod: CPTII,S$GLB,, | Performed by: OTOLARYNGOLOGY

## 2023-07-11 PROCEDURE — 4010F ACE/ARB THERAPY RXD/TAKEN: CPT | Mod: CPTII,S$GLB,, | Performed by: OTOLARYNGOLOGY

## 2023-07-11 PROCEDURE — 3075F SYST BP GE 130 - 139MM HG: CPT | Mod: CPTII,S$GLB,, | Performed by: OTOLARYNGOLOGY

## 2023-07-11 PROCEDURE — 1126F AMNT PAIN NOTED NONE PRSNT: CPT | Mod: CPTII,S$GLB,, | Performed by: OTOLARYNGOLOGY

## 2023-07-11 PROCEDURE — 3078F DIAST BP <80 MM HG: CPT | Mod: CPTII,S$GLB,, | Performed by: OTOLARYNGOLOGY

## 2023-07-11 PROCEDURE — 31231 NASAL ENDOSCOPY DX: CPT | Mod: S$GLB,,, | Performed by: OTOLARYNGOLOGY

## 2023-07-11 PROCEDURE — 31231 NASAL/SINUS ENDOSCOPY: ICD-10-PCS | Mod: S$GLB,,, | Performed by: OTOLARYNGOLOGY

## 2023-07-11 PROCEDURE — 3288F FALL RISK ASSESSMENT DOCD: CPT | Mod: CPTII,S$GLB,, | Performed by: OTOLARYNGOLOGY

## 2023-07-11 PROCEDURE — 3008F PR BODY MASS INDEX (BMI) DOCUMENTED: ICD-10-PCS | Mod: CPTII,S$GLB,, | Performed by: OTOLARYNGOLOGY

## 2023-07-11 PROCEDURE — 99999 PR PBB SHADOW E&M-EST. PATIENT-LVL V: CPT | Mod: PBBFAC,,, | Performed by: OTOLARYNGOLOGY

## 2023-07-11 PROCEDURE — 99999 PR PBB SHADOW E&M-EST. PATIENT-LVL V: ICD-10-PCS | Mod: PBBFAC,,, | Performed by: OTOLARYNGOLOGY

## 2023-07-11 PROCEDURE — 1159F PR MEDICATION LIST DOCUMENTED IN MEDICAL RECORD: ICD-10-PCS | Mod: CPTII,S$GLB,, | Performed by: OTOLARYNGOLOGY

## 2023-07-11 PROCEDURE — 99214 PR OFFICE/OUTPT VISIT, EST, LEVL IV, 30-39 MIN: ICD-10-PCS | Mod: 25,S$GLB,, | Performed by: OTOLARYNGOLOGY

## 2023-07-11 PROCEDURE — 1101F PT FALLS ASSESS-DOCD LE1/YR: CPT | Mod: CPTII,S$GLB,, | Performed by: OTOLARYNGOLOGY

## 2023-07-11 PROCEDURE — 4010F PR ACE/ARB THEARPY RXD/TAKEN: ICD-10-PCS | Mod: CPTII,S$GLB,, | Performed by: OTOLARYNGOLOGY

## 2023-07-11 PROCEDURE — 1159F MED LIST DOCD IN RCRD: CPT | Mod: CPTII,S$GLB,, | Performed by: OTOLARYNGOLOGY

## 2023-07-11 PROCEDURE — 1160F RVW MEDS BY RX/DR IN RCRD: CPT | Mod: CPTII,S$GLB,, | Performed by: OTOLARYNGOLOGY

## 2023-07-11 PROCEDURE — 3288F PR FALLS RISK ASSESSMENT DOCUMENTED: ICD-10-PCS | Mod: CPTII,S$GLB,, | Performed by: OTOLARYNGOLOGY

## 2023-07-11 PROCEDURE — 99214 OFFICE O/P EST MOD 30 MIN: CPT | Mod: 25,S$GLB,, | Performed by: OTOLARYNGOLOGY

## 2023-07-11 PROCEDURE — 1101F PR PT FALLS ASSESS DOC 0-1 FALLS W/OUT INJ PAST YR: ICD-10-PCS | Mod: CPTII,S$GLB,, | Performed by: OTOLARYNGOLOGY

## 2023-07-11 PROCEDURE — 3075F PR MOST RECENT SYSTOLIC BLOOD PRESS GE 130-139MM HG: ICD-10-PCS | Mod: CPTII,S$GLB,, | Performed by: OTOLARYNGOLOGY

## 2023-07-11 PROCEDURE — 1126F PR PAIN SEVERITY QUANTIFIED, NO PAIN PRESENT: ICD-10-PCS | Mod: CPTII,S$GLB,, | Performed by: OTOLARYNGOLOGY

## 2023-07-11 RX ORDER — FLUTICASONE PROPIONATE 50 MCG
2 SPRAY, SUSPENSION (ML) NASAL DAILY
Qty: 16 G | Refills: 2 | Status: SHIPPED | OUTPATIENT
Start: 2023-07-11 | End: 2023-10-06

## 2023-07-11 RX ORDER — AMOXICILLIN 500 MG/1
500 TABLET, FILM COATED ORAL EVERY 12 HOURS
Qty: 20 TABLET | Refills: 0 | Status: SHIPPED | OUTPATIENT
Start: 2023-07-11 | End: 2023-07-21

## 2023-07-11 NOTE — PROCEDURES
Nasal/sinus endoscopy    Date/Time: 7/11/2023 8:30 AM  Performed by: Nathaniel Goode MD  Authorized by: Nathaniel Goode MD     Consent Done?:  Yes (Verbal)  Anesthesia:     Local anesthetic:  Lidocaine 4% and Chad-Synephrine 1/2%    Patient tolerance:  Patient tolerated the procedure well with no immediate complications  Nose:     Procedure Performed:  Nasal Endoscopy  External:      No external nasal deformity  Intranasal:      Mucosa no polyps     Mucosa ulcers not present     No mucosa lesions present     Turbinates not enlarged     No septum gross deformity  Nasopharynx:      No mucosa lesions     Adenoids not present     Posterior choanae patent     Eustachian tube patent     Stringy nonpurulent mucus on left  Focal yellow purulent crusts and maxillary antrostomy and MT on right  Red nasopharynx  Larynx wnl

## 2023-07-11 NOTE — PROGRESS NOTES
Subjective:      Bhavna is a 67 y.o. female who comes for follow-up of sinusitis. Her last visit with me was on 3/28/2023. Now over 2 years status-post endoscopic sinus surgery.  Past 3 weeks has increased nasal congestion, runny nose, bilateral ear fullness, swollen left neck gland and sore throat.  Ran out of fluticasone spray.        %       The patient's medications, allergies, past medical, surgical, social and family histories were reviewed and updated as appropriate.    A detailed review of systems was obtained with pertinent positives as per the above HPI, and otherwise negative.        Objective:     /64 (BP Location: Left arm, Patient Position: Sitting)   Pulse 61   Wt 44.7 kg (98 lb 8.7 oz)   BMI 17.46 kg/m²        Constitutional:   She appears well-developed. She is cooperative. Normal speech.  No hoarse voice.      Head:  Normocephalic. Salivary glands normal.  Facial strength is normal.      Ears:    Right Ear: No drainage or tenderness. Tympanic membrane is not perforated. Tympanic membrane mobility is abnormal. No middle ear effusion. No decreased hearing is noted.   Left Ear: No drainage or tenderness. Tympanic membrane is not perforated. Tympanic membrane mobility is abnormal.  No middle ear effusion. No decreased hearing is noted.   Thickened TM bilaterally    Nose:  Mucosal edema and rhinorrhea present. No septal deviation or polyps. No epistaxis. Turbinates normal, no turbinate masses and no turbinate hypertrophy.  Right sinus exhibits no maxillary sinus tenderness and no frontal sinus tenderness. Left sinus exhibits no maxillary sinus tenderness and no frontal sinus tenderness.     Mouth/Throat  Oropharynx clear and moist without lesions or asymmetry and normal uvula midline. She does not have dentures. Normal dentition. No oral lesions or mucous membrane lesions. No oropharyngeal exudate or posterior oropharyngeal erythema. Mirror exam not performed due to patient tolerance.  Mirror  exam not performed due to patient tolerance.      Neck:  Neck normal without thyromegaly masses, asymmetry, normal tracheal structure, crepitus, and tenderness, thyroid normal, trachea normal and no adenopathy. Normal range of motion present.     She has no cervical adenopathy.   Isolated 1 cm left submandibular lymph node     Cardiovascular:    Regular rhythm.              Pulmonary/Chest:   Effort normal.     Psychiatric:   She has a normal mood and affect. Her speech is normal and behavior is normal.     Neurological:   No cranial nerve deficit.     Skin:   No rash noted.     Procedure    Nasal endoscopy performed.  See procedure note.        Data Reviewed    WBC (K/uL)   Date Value   06/23/2023 8.55     Eosinophil % (%)   Date Value   06/23/2023 2.6     Eos, Fluid (%)   Date Value   05/05/2014 5     Eos # (K/uL)   Date Value   06/23/2023 0.2     Platelets (K/uL)   Date Value   06/23/2023 341     Glucose (mg/dL)   Date Value   06/23/2023 87     No results found for: IGE    Pathology report indicated non-eosinophilic chronic inflammation.  Cultures showed no growth at last 2 visits.      Assessment:     1. Chronic rhinitis    2. Chronic maxillary sinusitis    3. Salpingitis of both eustachian tubes         Plan:     Rx amoxicillin 10 day course.  Refilled fluticasone spray.  Follow up if symptoms worsen or fail to improve.

## 2023-07-12 RX ORDER — EXEMESTANE 25 MG/1
TABLET ORAL
Qty: 90 TABLET | Refills: 3 | Status: SHIPPED | OUTPATIENT
Start: 2023-07-12 | End: 2023-07-25

## 2023-07-12 NOTE — TELEPHONE ENCOUNTER
Outgoing call to patient regarding message below. Informed patient email sent to disability desk for updates.     Disability desk did receive patients fmla paperwork.     Outgoing call to patient to inform her that our disability desk did receive fmla paperwork. No further issues at this time.       ----- Message from Shefali Tran sent at 7/12/2023  9:38 AM CDT -----  Contact: [phone]  Bhavna Landry calling regarding Patient Advice (message) Pt states that her FMLA letter was sent in on 07/05 and she still haven't heard anything pt states she needs that paper asking for a call back to discuss.

## 2023-07-18 ENCOUNTER — TELEPHONE (OUTPATIENT)
Dept: HEMATOLOGY/ONCOLOGY | Facility: CLINIC | Age: 68
End: 2023-07-18
Payer: MEDICARE

## 2023-07-18 NOTE — TELEPHONE ENCOUNTER
----- Message from Yong Gibson sent at 7/18/2023  1:33 PM CDT -----  Name of Who is Calling:PHUONG HAZEL [[id number]]        What is the request in detail: Pt called in regards to checking the status of her [place]. Please advise       Can the clinic reply by [facility]:NO         What Number to Call Back if not in [facility]:.Telephone Information:  Mobile          [phone]

## 2023-07-19 ENCOUNTER — TELEPHONE (OUTPATIENT)
Dept: PSYCHIATRY | Facility: CLINIC | Age: 68
End: 2023-07-19
Payer: MEDICARE

## 2023-07-19 DIAGNOSIS — F41.9 ANXIETY: Primary | ICD-10-CM

## 2023-07-19 RX ORDER — HYDROXYZINE HYDROCHLORIDE 25 MG/1
25 TABLET, FILM COATED ORAL 2 TIMES DAILY PRN
Qty: 60 TABLET | Refills: 2 | Status: SHIPPED | OUTPATIENT
Start: 2023-07-19 | End: 2024-02-23

## 2023-07-19 NOTE — TELEPHONE ENCOUNTER
Spoke with patient.  She has had worsening anxiety over the last few weeks for no reason.  Feeling anxious all the time but this morning woke up feeling particularly anxious.  Denies panic attacks.  Discussed possibility of increasing Effexor back to 150mg daily since she was on this dose for years.  I will touch base with her medication team regarding their thoughts on her chronic hyponatremia.  Will start hydroxyzine 25mg BID PRN anxiety as well.  In person follow up appointment scheduled for September.

## 2023-07-19 NOTE — TELEPHONE ENCOUNTER
----- Message from Carol Ann Humphrey MA sent at 7/19/2023  8:18 AM CDT -----  Good morning patient is having anxiety attacks and having shortness of breath. [facility], she wanted a call from you today ? Thanks

## 2023-07-20 DIAGNOSIS — F41.9 ANXIETY: ICD-10-CM

## 2023-07-20 DIAGNOSIS — F33.42 RECURRENT MAJOR DEPRESSIVE DISORDER, IN FULL REMISSION: ICD-10-CM

## 2023-07-20 RX ORDER — VENLAFAXINE HYDROCHLORIDE 150 MG/1
150 CAPSULE, EXTENDED RELEASE ORAL DAILY
Qty: 90 CAPSULE | Refills: 3 | Status: SHIPPED | OUTPATIENT
Start: 2023-07-20 | End: 2023-12-28 | Stop reason: SDUPTHER

## 2023-07-24 DIAGNOSIS — C50.411 CARCINOMA OF UPPER-OUTER QUADRANT OF RIGHT BREAST IN FEMALE, ESTROGEN RECEPTOR POSITIVE: ICD-10-CM

## 2023-07-24 DIAGNOSIS — Z17.0 CARCINOMA OF UPPER-OUTER QUADRANT OF RIGHT BREAST IN FEMALE, ESTROGEN RECEPTOR POSITIVE: ICD-10-CM

## 2023-07-25 RX ORDER — EXEMESTANE 25 MG/1
TABLET ORAL
Qty: 90 TABLET | Refills: 3 | Status: SHIPPED | OUTPATIENT
Start: 2023-07-25 | End: 2023-11-07 | Stop reason: SDUPTHER

## 2023-08-01 ENCOUNTER — TELEPHONE (OUTPATIENT)
Dept: PAIN MEDICINE | Facility: CLINIC | Age: 68
End: 2023-08-01
Payer: MEDICARE

## 2023-08-01 NOTE — TELEPHONE ENCOUNTER
----- Message from Carol Ann Brown sent at 7/31/2023  3:51 PM CDT -----  Type:  Needs Medical Advice    Who Called: pt  Symptoms (please be specific): pt needs to get a time for her procedure on Wednesday Aug 2 so she can lock in a ride please call back today  Would the patient rather a call back or a response via [facility]? call  Best Call Back Number: [phone]  Additional Information:

## 2023-08-02 ENCOUNTER — TELEPHONE (OUTPATIENT)
Dept: RHEUMATOLOGY | Facility: CLINIC | Age: 68
End: 2023-08-02
Payer: MEDICARE

## 2023-08-02 ENCOUNTER — HOSPITAL ENCOUNTER (OUTPATIENT)
Facility: HOSPITAL | Age: 68
Discharge: HOME | End: 2023-08-02
Attending: ANESTHESIOLOGY | Admitting: ANESTHESIOLOGY
Payer: MEDICARE

## 2023-08-02 VITALS
BODY MASS INDEX: 17.85 KG/M2 | TEMPERATURE: 98 F | OXYGEN SATURATION: 98 % | RESPIRATION RATE: 16 BRPM | SYSTOLIC BLOOD PRESSURE: 177 MMHG | DIASTOLIC BLOOD PRESSURE: 73 MMHG | WEIGHT: 97 LBS | HEIGHT: 62 IN | HEART RATE: 66 BPM

## 2023-08-02 DIAGNOSIS — G89.29 CHRONIC PAIN: ICD-10-CM

## 2023-08-02 DIAGNOSIS — M54.2 CERVICALGIA: Primary | ICD-10-CM

## 2023-08-02 DIAGNOSIS — M54.16 LUMBAR RADICULOPATHY: Primary | ICD-10-CM

## 2023-08-02 PROCEDURE — 62323 PR INJ LUMBAR/SACRAL, W/IMAGING GUIDANCE: ICD-10-PCS | Mod: ,,, | Performed by: ANESTHESIOLOGY

## 2023-08-02 PROCEDURE — 63600175 PHARM REV CODE 636 W HCPCS: Performed by: ANESTHESIOLOGY

## 2023-08-02 PROCEDURE — 25000003 PHARM REV CODE 250: Performed by: ANESTHESIOLOGY

## 2023-08-02 PROCEDURE — 62323 NJX INTERLAMINAR LMBR/SAC: CPT | Mod: ,,, | Performed by: ANESTHESIOLOGY

## 2023-08-02 PROCEDURE — 25500020 PHARM REV CODE 255: Performed by: ANESTHESIOLOGY

## 2023-08-02 PROCEDURE — 62323 NJX INTERLAMINAR LMBR/SAC: CPT | Performed by: ANESTHESIOLOGY

## 2023-08-02 RX ORDER — LIDOCAINE HYDROCHLORIDE 20 MG/ML
INJECTION, SOLUTION EPIDURAL; INFILTRATION; INTRACAUDAL; PERINEURAL
Status: DISCONTINUED | OUTPATIENT
Start: 2023-08-02 | End: 2023-08-02 | Stop reason: HOSPADM

## 2023-08-02 RX ORDER — DEXAMETHASONE SODIUM PHOSPHATE 10 MG/ML
INJECTION INTRAMUSCULAR; INTRAVENOUS
Status: DISCONTINUED | OUTPATIENT
Start: 2023-08-02 | End: 2023-08-02 | Stop reason: HOSPADM

## 2023-08-02 RX ORDER — LIDOCAINE HYDROCHLORIDE 10 MG/ML
INJECTION, SOLUTION EPIDURAL; INFILTRATION; INTRACAUDAL; PERINEURAL
Status: DISCONTINUED | OUTPATIENT
Start: 2023-08-02 | End: 2023-08-02 | Stop reason: HOSPADM

## 2023-08-02 RX ORDER — SODIUM CHLORIDE 9 MG/ML
500 INJECTION, SOLUTION INTRAVENOUS CONTINUOUS
Status: DISCONTINUED | OUTPATIENT
Start: 2023-08-02 | End: 2023-08-02 | Stop reason: HOSPADM

## 2023-08-02 NOTE — DISCHARGE SUMMARY
Discharge Note  Short Stay      SUMMARY     Admit Date: 8/2/2023    Attending Physician: Chirag Kim      Discharge Physician: Chirag Kim      Discharge Date: 8/2/2023 8:58 AM    Procedure(s) (LRB):  Injection-steroid-epidural-lumbar L5-S1 (N/A)    Final Diagnosis: Lumbar radiculopathy [M54.16]  DDD (degenerative disc disease), lumbar [M51.36]    Disposition: Home or self care    Patient Instructions:   Current Discharge Medication List        CONTINUE these medications which have NOT CHANGED    Details   albuterol (PROVENTIL/VENTOLIN HFA) 90 mcg/actuation inhaler USE 2 INHALATIONS BY MOUTH  4 TIMES DAILY AS NEEDED  Qty: 34 g, Refills: 3    Comments: Requesting 1 year supply      amLODIPine (NORVASC) 2.5 MG tablet Take 1 tablet (2.5 mg total) by mouth 2 (two) times daily.  Qty: 90 tablet, Refills: 0    Comments: .  Associated Diagnoses: Essential hypertension      ascorbic acid, vitamin C, (VITAMIN C) 500 MG tablet Take 500 mg by mouth once daily.      aspirin (ECOTRIN) 81 MG EC tablet Take 81 mg by mouth once daily.      cyanocobalamin 500 MCG tablet Take 500 mcg by mouth once daily.      diclofenac sodium (VOLTAREN) 1 % Gel Apply 2 g topically once daily.  Qty: 200 g, Refills: 0      exemestane (AROMASIN) 25 mg tablet TAKE 1 TABLET(25 MG) BY MOUTH EVERY DAY  Qty: 90 tablet, Refills: 3    Associated Diagnoses: 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, estrogen receptor positive      fluticasone propionate (FLONASE) 50 mcg/actuation nasal spray 2 sprays (100 mcg total) by Each Nostril route once daily.  Qty: 16 g, Refills: 2    Associated Diagnoses: Chronic rhinitis      folic acid (FOLVITE) 1 MG tablet Take 1 tablet (1,000 mcg total) by mouth once daily.  Qty: 90 tablet, Refills: 3    Comments: Requesting 1 year supply  Associated Diagnoses: Psoriatic arthritis      !! gabapentin (NEURONTIN) 300 MG capsule Take 1 capsule (300 mg total) by mouth every evening.  Qty: 90 capsule, Refills: 3     Associated Diagnoses: Anxiety; Insomnia, unspecified type; Restless legs      hydrOXYzine HCL (ATARAX) 25 MG tablet Take 1 tablet (25 mg total) by mouth 2 (two) times daily as needed for Anxiety.  Qty: 60 tablet, Refills: 2    Associated Diagnoses: Anxiety      isosorbide mononitrate (IMDUR) 30 MG 24 hr tablet TAKE 1 TABLET(30 MG) BY MOUTH EVERY DAY  Qty: 30 tablet, Refills: 11      methocarbamoL (ROBAXIN) 500 MG Tab 1-2 tabs po q 12 h prn muscle cramps  Qty: 60 tablet, Refills: 1      multivitamin (THERAGRAN) per tablet Take 1 tablet by mouth once daily.      omega-3 fatty acids/fish oil (FISH OIL-OMEGA-3 FATTY ACIDS) 300-1,000 mg capsule Take by mouth once daily.      omeprazole (PRILOSEC) 20 MG capsule Take 1 capsule (20 mg total) by mouth once daily.  Qty: 30 capsule, Refills: 11    Associated Diagnoses: Gastroesophageal reflux disease, unspecified whether esophagitis present; Erosive gastropathy      !! risperiDONE (RISPERDAL) 2 MG tablet TAKE 1 TABLET(2 MG) BY MOUTH EVERY MORNING  Qty: 90 tablet, Refills: 3    Associated Diagnoses: Paranoid schizophrenia      traMADoL (ULTRAM) 50 mg tablet Take 1 tablet (50 mg total) by mouth every 6 (six) hours as needed for Pain.  Qty: 30 each, Refills: 0    Comments: Quantity prescribed more than 7 day supply? Yes, quantity medically necessary  Associated Diagnoses: 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, estrogen receptor positive      venlafaxine (EFFEXOR-XR) 150 MG Cp24 Take 1 capsule (150 mg total) by mouth once daily.  Qty: 90 capsule, Refills: 3    Associated Diagnoses: Anxiety; Recurrent major depressive disorder, in full remission      VITAMIN A ORAL Take by mouth.      vitamin D (VITAMIN D3) 1000 units Tab Take 1,000 Units by mouth once daily.      vitamin E 200 UNIT capsule Take 200 Units by mouth once daily.      zinc gluconate 50 mg tablet Take 50 mg by mouth once daily.      atorvastatin (LIPITOR) 80 MG tablet Take 1 tablet (80 mg total)  by mouth once daily.  Qty: 90 tablet, Refills: 3    Associated Diagnoses: Hyperlipidemia, unspecified hyperlipidemia type      benztropine (COGENTIN) 0.5 MG tablet Take 1 tablet (0.5 mg total) by mouth 2 (two) times daily.  Qty: 180 tablet, Refills: 3    Associated Diagnoses: Extrapyramidal symptom      budesonide-formoterol 80-4.5 mcg (SYMBICORT) 80-4.5 mcg/actuation HFAA Inhale 2 puffs into the lungs 2 (two) times a day.  Qty: 6.9 g, Refills: 11      cloNIDine (CATAPRES) 0.1 MG tablet 1 tab po q day for systolic BP > 160 or DBP >100.  Qty: 30 tablet, Refills: 1    Comments: .      fluticasone (VERAMYST) 27.5 mcg/actuation nasal spray 2 sprays by Nasal route as needed for Rhinitis.      !! gabapentin (NEURONTIN) 100 MG capsule Take 1 capsule (100 mg total) by mouth 2 (two) times daily as needed (anxiety).  Qty: 180 capsule, Refills: 3    Associated Diagnoses: Anxiety      methotrexate 2.5 MG Tab Take 8 tablets (20 mg total) by mouth every 7 days. Take 4 tabs Mon am and 4 tabs Mon pm only  Qty: 96 tablet, Refills: 0    Associated Diagnoses: Psoriatic arthritis      !! risperiDONE (RISPERDAL) 4 MG tablet Take 1 tablet (4 mg total) by mouth every evening.  Qty: 90 tablet, Refills: 3    Associated Diagnoses: Paranoid schizophrenia       !! - Potential duplicate medications found. Please discuss with provider.              Discharge Diagnosis: Lumbar radiculopathy [M54.16]  DDD (degenerative disc disease), lumbar [M51.36]  Condition on Discharge: Stable with no complications to procedure   Diet on Discharge: Same as before.  Activity: as per instruction sheet.  Discharge to: Home with a responsible adult.  Follow up: 2 weeks

## 2023-08-02 NOTE — OP NOTE
Lumbar Interlaminar Epidural Steroid Injection under Fluoroscopic Guidance    The procedure, risks, benefits, and options were discussed with the patient. There are no contraindications to the procedure. The patent expressed understanding and agreed to the procedure. Informed written consent was obtained prior to the start of the procedure and can be found in the patient's chart.    PATIENT NAME: Bhavna Landry   MRN: [id number]     DATE OF PROCEDURE: 08/02/2023    PROCEDURE: Lumbar Interlaminar Epidural Steroid Injection L5/S1 under Fluoroscopic Guidance    PRE-OP DIAGNOSIS: Lumbar radiculopathy [M54.16]  DDD (degenerative disc disease), lumbar [M51.36] Lumbar radiculopathy [M54.16]    POST-OP DIAGNOSIS: Same    PHYSICIAN: Chirag Kim MD    ASSISTANTS: None     MEDICATIONS INJECTED: Preservative-free Decadron 10mg with 4cc of Lidocaine 1% MPF and preservative free normal saline    LOCAL ANESTHETIC INJECTED: Xylocaine 2%     SEDATION: None    ESTIMATED BLOOD LOSS: None    COMPLICATIONS: None    TECHNIQUE: Time-out was performed to identify the patient and procedure to be performed. With the patient laying in a prone position, the surgical area was prepped and draped in the usual sterile fashion using ChloraPrep and a fenestrated drape. The level was determined under fluoroscopy guidance. Skin anesthesia was achieved by injecting Lidocaine 2% over the injection site. The interlaminar space was then approached with a 20 gauge,  3.5 inch Tuohy needle that was introduced under fluoroscopic guidance in the AP, lateral and/or contralateral oblique imaging. Once the Ligamentum flavum was encountered loss of resistance to saline was used to enter the epidural space. With positive loss of resistance and negative aspiration for CSF or Blood, contrast dye Omnipaque (300mg/mL) was injected to confirm placement and there was no vascular runoff. 5 mL of the medication mixture listed above was injected slowly. Displacement of the  radio opaque contrast after injection of the medication confirmed that the medication went into the area of the epidural space. The needles were removed and bleeding was nil. A sterile dressing was applied. No specimens collected. The patient tolerated the procedure well.       The patient was monitored after the procedure in the recovery area. They were given post-procedure and discharge instructions to follow at home. The patient was discharged in a stable condition.        Chirag Kim MD

## 2023-08-02 NOTE — PLAN OF CARE
Discharge instructions given and explained to patient and family with verbalization of understanding all instructions. Patient ambulatory and stable on feet. Patients v/s stable, denies n/v and tolerating po, rates pain level tolerable, IV removed, and family present for patient discharge home.

## 2023-08-02 NOTE — TELEPHONE ENCOUNTER
Called and left a message letting the patient know that I sent her message to Dr Vargas to place xray orders as Dr Cornelius is out of the office for the next 2 weeks

## 2023-08-02 NOTE — H&P
HPI  68 year old female with lumbar radiculopathy        Past Medical History:   Diagnosis Date    Allergy     Amblyopia     Anemia     Anticoagulant long-term use     Arthritis 1992    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, estrogen receptor positive 2022    Cataract     Depression     Dry eyes     Dry mouth     Duane's syndrome of right eye     Early dry stage nonexudative age-related macular degeneration of both eyes 2022    Fibromyalgia 2014    Fibromyalgia     Fractured hip     RIGHT HIP    GERD (gastroesophageal reflux disease)     History of psychiatric hospitalization     Hyperlipidemia 1992    Hypertension     Hypocalcemia     Hyponatremia     Kidney stone     Migraine headache     Osteoporosis     Pressure ulcer of unspecified site, unspecified stage     Psoriatic arthritis 1992    Right knee pain     post knee replacement surgery (possible rejectiion of metal)    RLS (restless legs syndrome)     Schizophrenia 1992    stable on meds    Sciatica     Squamous cell carcinoma of skin     Urinary tract infection      Past Surgical History:   Procedure Laterality Date    brow ptosis repair Right 2019    Surgeon: Dr. Karla Henson    CATARACT EXTRACTION       SECTION      COLONOSCOPY N/A 2016    Procedure: COLONOSCOPY;  Surgeon: ANDRIA Connelly MD;  Location: [address]);  Service: Endoscopy;  Laterality: N/A;    COLONOSCOPY N/A 2022    Procedure: COLONOSCOPY;  Surgeon: Mikey Walters MD;  Location: [facility];  Service: Endoscopy;  Laterality: N/A;    cyst removed from right sinus  1982    ESOPHAGOGASTRODUODENOSCOPY N/A 2023    Procedure: EGD (ESOPHAGOGASTRODUODENOSCOPY);  Surgeon: Dougie Shea MD;  Location: [facility];  Service: Endoscopy;  Laterality: N/A;    HYSTERECTOMY      VAGINAL HYSTERECTOMY WITHOUT BSO - ENDOMETRIOSIS    INJECTION FOR SENTINEL NODE IDENTIFICATION Right 2022    Procedure:  INJECTION, FOR SENTINEL NODE IDENTIFICATION-Right;  Surgeon: NEAL Dhillon MD;  Location: [facility] OR;  Service: General;  Laterality: Right;    INJECTION OF ANESTHETIC AGENT AROUND MEDIAL BRANCH NERVES INNERVATING LUMBAR FACET JOINT N/A 4/11/2019    Procedure: Lumbo-sacral Block, DR5 and Lateral Branches of S1,S2, S3;  Surgeon: Michelle Pineda Jr., MD;  Location: [facility] [place];  Service: Pain Management;  Laterality: N/A;  Pt takes  and states she holds ASA on her own whenever she has procedures.  Instructed to hold x 3 days prior to procedure.      INJECTION OF ANESTHETIC AGENT AROUND MEDIAL BRANCH NERVES INNERVATING LUMBAR FACET JOINT Bilateral 5/3/2023    Procedure: Block-nerve-medial branch-lumbar bilateral L3, L4, L5;  Surgeon: Maile Storm DO;  Location: [facility] [place];  Service: Pain Management;  Laterality: Bilateral;  asa    INJECTION OF ANESTHETIC AGENT INTO SACROILIAC JOINT Right 8/30/2018    Procedure: BLOCK, SACROILIAC JOINT-Right- ORAL SEDATION;  Surgeon: Michelle Pineda Jr., MD;  Location: [facility] [place];  Service: Pain Management;  Laterality: Right;    INJECTION OF ANESTHETIC AGENT INTO SACROILIAC JOINT Bilateral 9/27/2018    Procedure: BLOCK, SACROILIAC JOINT-BILATERAL;  Surgeon: Michelle Pineda Jr., MD;  Location: [facility];  Service: Pain Management;  Laterality: Bilateral;  Please keep at 10:00 due to trasnsportation    INJECTION OF ANESTHETIC AGENT INTO SACROILIAC JOINT Bilateral 2/7/2019    Procedure: Bilateral Sacroiliac Joint Injection - Per Dr Pineda, not necessary to hold ASA.;  Surgeon: Michelle Pineda Jr., MD;  Location: [facility] [place];  Service: Pain Management;  Laterality: Bilateral;    INTRAOCULAR PROSTHESES INSERTION Right 8/1/2019    Procedure: INSERTION, IOL PROSTHESIS;  Surgeon: Karen Song MD;  Location: [address];  Service: Ophthalmology;  Laterality: Right;    INTRAOCULAR PROSTHESES INSERTION Left 9/26/2019    Procedure: INSERTION, IOL  PROSTHESIS;  Surgeon: Karen Song MD;  Location: [address];  Service: Ophthalmology;  Laterality: Left;    JOINT REPLACEMENT Right     knee    KNEE SURGERY      MASTECTOMY Right 5/9/2022    Procedure: MASTECTOMY-Right;  Surgeon: NEAL Dhillon MD;  Location: [facility];  Service: General;  Laterality: Right;  2.5 HOURS    PHACOEMULSIFICATION OF CATARACT Right 8/1/2019    Procedure: PHACOEMULSIFICATION, CATARACT;  Surgeon: Karen Song MD;  Location: [address];  Service: Ophthalmology;  Laterality: Right;    PHACOEMULSIFICATION OF CATARACT Left 9/26/2019    Procedure: PHACOEMULSIFICATION, CATARACT;  Surgeon: Karen Song MD;  Location: [address];  Service: Ophthalmology;  Laterality: Left;    RADIOFREQUENCY THERMOCOAGULATION Right 5/7/2019    Procedure: RADIOFREQUENCY THERMAL COAGULATION RIGHT DORSAL RAMUS 5 AND LATERAL BRANCH OF S1, S2 AND S3;  Surgeon: Michelle Pineda Jr., MD;  Location: [facility];  Service: Pain Management;  Laterality: Right;    RADIOFREQUENCY THERMOCOAGULATION Left 5/14/2019    Procedure: RADIOFREQUENCY THERMAL COAGULATION LEFT DORSAL RAMUS 5 AND LATERAL BRANCH OF S1,S2 AND S3;  Surgeon: Michelle Pineda Jr., MD;  Location: [facility];  Service: Pain Management;  Laterality: Left;    RADIOFREQUENCY THERMOCOAGULATION Right 10/22/2019    Procedure: RADIOFREQUENCY THERMAL COAGULATION---DARSAL RAMUS 5 and LATERAL S1,S2,and S3 Right;  Surgeon: Michelle Pineda Jr., MD;  Location: [facility];  Service: Pain Management;  Laterality: Right;  patient to sign consent DOS    RADIOFREQUENCY THERMOCOAGULATION Left 10/29/2019    Procedure: RADIOFREQUENCY THERMAL COAGULATION - LEFT - DR5, S1,S2, AND S3;  Surgeon: Michelle Pineda Jr., MD;  Location: [facility];  Service: Pain Management;  Laterality: Left;    RADIOFREQUENCY THERMOCOAGULATION Left 5/26/2020    Procedure: RADIOFREQUENCY THERMAL COAGULATION--Left DR5+ lateral branches of S1, S2, S3;  Surgeon:  "Michelle Pineda Jr., MD;  Location: [facility];  Service: Pain Management;  Laterality: Left;    RADIOFREQUENCY THERMOCOAGULATION Right 6/2/2020    Procedure: RADIOFREQUENCY THERMAL COAGULATION--Right DR5+ lateral branches of S1, S2, S3;  Surgeon: Michelle Pineda Jr., MD;  Location: [facility];  Service: Pain Management;  Laterality: Right;    SENTINEL LYMPH NODE BIOPSY Right 5/9/2022    Procedure: BIOPSY, LYMPH NODE, SENTINEL-Right;  Surgeon: NEAL Dhillon MD;  Location: [facility] OR;  Service: General;  Laterality: Right;    Surgery on right knee  07/09/1982    tumor removed from back left side upper shoulder  03/17/2006     Review of patient's allergies indicates:   Allergen Reactions    Etanercept Other (See Comments)     Other reaction(s): recurrent infections    Chloramphenicol sod succinate Hives    Codeine Other (See Comments)     Other reaction(s): Stomach upset. Pt states OK with Percocet    Nickel sutures [surgical stainless steel] Dermatitis     Allergic contact dermatitis    Adhesive Rash        PMHx, PSHx, Allergies, Medications reviewed in epic      ROS negative except pain complaints in HPI    OBJECTIVE:    BP (!) 142/65 (BP Location: Left arm, Patient Position: Lying)   Pulse 78   Temp 98.6 °F (37 °C) (Temporal)   Resp 16   Ht 5' 2" (1.575 m)   Wt 44 kg (97 lb)   SpO2 95%   Breastfeeding No   BMI 17.74 kg/m²     PHYSICAL EXAMINATION:    GENERAL: Well appearing, in no acute distress, alert and oriented x3.  PSYCH:  Mood and affect appropriate.  SKIN: Skin color, texture, turgor normal, no rashes or lesions.  CV: RRR with palpation of the radial artery.  PULM: No evidence of respiratory difficulty, symmetric chest rise. Clear to auscultation.  NEURO: Cranial nerves grossly intact.    Plan:    Proceed with procedure as planned    Chirag Kim  08/02/2023    "

## 2023-08-02 NOTE — TELEPHONE ENCOUNTER
----- Message from Génesis Guidry sent at 8/2/2023  9:44 AM CDT -----  Who Called: Pt    What is the request in detail: Requesting call back to discuss pt requesting order be put in and scheduled for an xray of her neck at her 08/11 lab appt. Pt feels like bone is rubbing on bone. Please advise    Can the clinic reply by [facility]? No    Best Call Back Number: [phone]      Additional Information:

## 2023-08-03 ENCOUNTER — TELEPHONE (OUTPATIENT)
Dept: RHEUMATOLOGY | Facility: CLINIC | Age: 68
End: 2023-08-03
Payer: MEDICARE

## 2023-08-03 NOTE — TELEPHONE ENCOUNTER
----- Message from Jessica Genao MA sent at 8/2/2023  3:09 PM CDT -----  If possible please place cervical xrays per patients request  ----- Message -----  From: Angela Fields MA  Sent: 8/2/2023   2:52 PM CDT  To: BALBIR CarolinaloDr. Vargas is currently out on vacation until 8-14. If this something she needs done urgently she would have to schedule an appointment to see someone for the order.  ----- Message -----  From: Jessica Genao MA  Sent: 8/2/2023   1:15 PM CDT  To: Alicia CASTANEDA Staff    Patient is requesting a neck xray for 08/11 when she comes for labs Dr Cornelius in Rheumatology is out of the office on vacation and this is something that needs to be brought to Dr Vargas's attention. If possible please place xray orders and I can schedule if you let me know    Thank you  Jessica, Rheumatology  ----- Message -----  From: Génesis Guidry  Sent: 8/2/2023   9:46 AM CDT  To: Ashli PANCHAL Staff    Who Called: Pt    What is the request in detail: Requesting call back to discuss pt requesting order be put in and scheduled for an xray of her neck at her 08/11 lab appt. Pt feels like bone is rubbing on bone. Please advise    Can the clinic reply by [facility]? No    Best Call Back Number: [phone]      Additional Information:

## 2023-08-08 ENCOUNTER — OFFICE VISIT (OUTPATIENT)
Dept: HEMATOLOGY/ONCOLOGY | Facility: CLINIC | Age: 68
End: 2023-08-08
Payer: MEDICARE

## 2023-08-08 VITALS
DIASTOLIC BLOOD PRESSURE: 66 MMHG | SYSTOLIC BLOOD PRESSURE: 151 MMHG | HEIGHT: 62 IN | WEIGHT: 97.69 LBS | TEMPERATURE: 98 F | HEART RATE: 75 BPM | BODY MASS INDEX: 17.98 KG/M2 | OXYGEN SATURATION: 97 %

## 2023-08-08 DIAGNOSIS — C50.411 CARCINOMA OF UPPER-OUTER QUADRANT OF RIGHT BREAST IN FEMALE, ESTROGEN RECEPTOR POSITIVE: Primary | ICD-10-CM

## 2023-08-08 DIAGNOSIS — Z17.0 CARCINOMA OF UPPER-OUTER QUADRANT OF RIGHT BREAST IN FEMALE, ESTROGEN RECEPTOR POSITIVE: Primary | ICD-10-CM

## 2023-08-08 PROCEDURE — 1126F AMNT PAIN NOTED NONE PRSNT: CPT | Mod: CPTII,S$GLB,, | Performed by: INTERNAL MEDICINE

## 2023-08-08 PROCEDURE — 99214 PR OFFICE/OUTPT VISIT, EST, LEVL IV, 30-39 MIN: ICD-10-PCS | Mod: S$GLB,,, | Performed by: INTERNAL MEDICINE

## 2023-08-08 PROCEDURE — 3008F BODY MASS INDEX DOCD: CPT | Mod: CPTII,S$GLB,, | Performed by: INTERNAL MEDICINE

## 2023-08-08 PROCEDURE — 1159F MED LIST DOCD IN RCRD: CPT | Mod: CPTII,S$GLB,, | Performed by: INTERNAL MEDICINE

## 2023-08-08 PROCEDURE — 3077F PR MOST RECENT SYSTOLIC BLOOD PRESSURE >= 140 MM HG: ICD-10-PCS | Mod: CPTII,S$GLB,, | Performed by: INTERNAL MEDICINE

## 2023-08-08 PROCEDURE — 1126F PR PAIN SEVERITY QUANTIFIED, NO PAIN PRESENT: ICD-10-PCS | Mod: CPTII,S$GLB,, | Performed by: INTERNAL MEDICINE

## 2023-08-08 PROCEDURE — 1159F PR MEDICATION LIST DOCUMENTED IN MEDICAL RECORD: ICD-10-PCS | Mod: CPTII,S$GLB,, | Performed by: INTERNAL MEDICINE

## 2023-08-08 PROCEDURE — 4010F ACE/ARB THERAPY RXD/TAKEN: CPT | Mod: CPTII,S$GLB,, | Performed by: INTERNAL MEDICINE

## 2023-08-08 PROCEDURE — 3077F SYST BP >= 140 MM HG: CPT | Mod: CPTII,S$GLB,, | Performed by: INTERNAL MEDICINE

## 2023-08-08 PROCEDURE — 3078F DIAST BP <80 MM HG: CPT | Mod: CPTII,S$GLB,, | Performed by: INTERNAL MEDICINE

## 2023-08-08 PROCEDURE — 1101F PT FALLS ASSESS-DOCD LE1/YR: CPT | Mod: CPTII,S$GLB,, | Performed by: INTERNAL MEDICINE

## 2023-08-08 PROCEDURE — 99214 OFFICE O/P EST MOD 30 MIN: CPT | Mod: S$GLB,,, | Performed by: INTERNAL MEDICINE

## 2023-08-08 PROCEDURE — 3008F PR BODY MASS INDEX (BMI) DOCUMENTED: ICD-10-PCS | Mod: CPTII,S$GLB,, | Performed by: INTERNAL MEDICINE

## 2023-08-08 PROCEDURE — 99999 PR PBB SHADOW E&M-EST. PATIENT-LVL IV: ICD-10-PCS | Mod: PBBFAC,,, | Performed by: INTERNAL MEDICINE

## 2023-08-08 PROCEDURE — 99999 PR PBB SHADOW E&M-EST. PATIENT-LVL IV: CPT | Mod: PBBFAC,,, | Performed by: INTERNAL MEDICINE

## 2023-08-08 PROCEDURE — 1101F PR PT FALLS ASSESS DOC 0-1 FALLS W/OUT INJ PAST YR: ICD-10-PCS | Mod: CPTII,S$GLB,, | Performed by: INTERNAL MEDICINE

## 2023-08-08 PROCEDURE — 3288F PR FALLS RISK ASSESSMENT DOCUMENTED: ICD-10-PCS | Mod: CPTII,S$GLB,, | Performed by: INTERNAL MEDICINE

## 2023-08-08 PROCEDURE — 3078F PR MOST RECENT DIASTOLIC BLOOD PRESSURE < 80 MM HG: ICD-10-PCS | Mod: CPTII,S$GLB,, | Performed by: INTERNAL MEDICINE

## 2023-08-08 PROCEDURE — 3288F FALL RISK ASSESSMENT DOCD: CPT | Mod: CPTII,S$GLB,, | Performed by: INTERNAL MEDICINE

## 2023-08-08 PROCEDURE — 4010F PR ACE/ARB THEARPY RXD/TAKEN: ICD-10-PCS | Mod: CPTII,S$GLB,, | Performed by: INTERNAL MEDICINE

## 2023-08-08 NOTE — PROGRESS NOTES
PROGRESS NOTE    Subjective:       Patient ID: Bhavna Landry is a 68 y.o. female.  MRN: [id number]  : 1955    Chief Complaint: Follow-up (3 month f/u)    pT1b N0 (i+)  grade 2 ER + AK - HER2 - IDC of the right breast.    History of Present Illness:   Bhavna Landry is a 68 y.o. female who is referred for right breast IDC.     She presented for screening mammogram on 2022 . This identified an asymmetry in the right breast. Follow-up mammogram and ultrasound on 2022 showed a 1 x 0.9 x 0.8 cm mass at the 9 o'clock position of the right breast 3 cm from the nipple. A ultrasound guided biopsy was performed on 2022 with pathology revealing invasive ductal carcinoma of the breast.     She saw Dr. Dhillon and underwent right mastectomy and sentinel node exam.  It showed IDC 8 mm, grade 2, ER strongly positive, AK negative, HER2/jayla 1+, Ki-67 12%.  There was also DCIS, multiple foci up to 8 mm, spanning a distance of 20 mm.  Nuclear grade 2. Two sentinel nodes removed, 1 had isolated tumor cells.    Oncotype DX RS 28; Chemo benefit >15%     She has declined adjuvant chemotherapy.     Started Anastrozole  22, switched to exemestane due to arthralgias.     Interim history:   She presents today for regular follow-up.     Tolerating exemestane fairly well, taking daily. No night sweats, has stable chronic joint pains.   Had a cortisone injection in her back last week, seems to be helping. Still on Tramadol, going to the gym, walking on the treadmill.     Has lost about 9 lbs in one year. Reports loss of appetite.     She was recently diagnosed with TONIE and B12 def. On B12, completed iron. Had a colonoscopy in Dec,(sessile polyps)  had EGD in February that showed gastritis.         Oncology History:  1. Breast, right, mastectomy:   - Invasive ductal carcinoma       - Greatest dimension:  8 mm       - Jonathan-Alaniz modified SBR score 3 + 2 + 1 = 6 of 9        "- Histologic grade 2 of 3       - Mitotic index = 0.2 (average mitoses per high power field) (low)       - Resection margin free of invasive carcinoma           - Invasive carcinoma present 9 mm to closest posterior margin, 26 mm   to inferior margin, and 45 mm to superior margin   - Biopsy clip present   - No lymphovascular invasion is identified   - Ductal carcinoma in situ       - Foci up to 8 mm in greatest dimension spanning a distance of   approximately 20 mm       - Nuclear grade ranges from 2 to 3 of 3       - Solid and cribriform patterns       - Central comedonecrosis present:  Approximately 80%       - Resection margin free of DCIS           - DCIS present present 9 mm to closest posterior margin, 26 mm to   inferior margin, and 45 mm to superior margin   - Background breast contains usual ductal hyperplasia, apocrine metaplasia,   microcysts and macrocysts, ectatic ducts, and stromal fibrosis   - Microcalcifications, calcium phosphate type, associated with DCIS and   focally with medial calcific arterial stenosis (Monckeberg's sclerosis)   - See CAP Tumor Synoptic   2. [place] lymph node, right axillary, "#1 hot and blue count 2004,"   excision:   - 1 lymph node, isolated tumor cells present   3. [place] lymph node, right axillary, "#2 hot and blue count 1700,"   excision:   - 1 lymph node, negative for metastatic carcinoma   CAP Tumor Synoptic:   Procedure:  Total mastectomy   Specimen laterality:  Right   Tumor site:  9 o'clock   Histologic type:  Invasive carcinoma of no special type (ductal)   Histologic grade (Munich histologic score):  Shelly score 3 + 2 + 1 =   6 of 9   Glandular (acinar) / tubular differentiation:  Score 3   Nuclear pleomorphism:  Score 2   Mitotic grade:  Score 1   Overall grade:  Grade 2   Tumor size:  8 mm   Tumor focality:  Single focus of invasive carcinoma   Ductal carcinoma in situ (DCIS):  Present (Negative for extensive intraductal   component)       Size " (extent) of DCIS:  Foci up to 8 mm in greatest dimension spanning a   distance of approximately 20 mm       Number blocks with DCIS:  4       Number blocks examined:  13       Architectural patterns:  Comedo, cribriform, solid       Nuclear grade:  Ranges from grade 2 (intermediate) to grade 3 (high)   Lobular carcinoma in situ (LCIS):  Not identified   Tumor extent:  Not applicable   Lymphovascular invasion:  Not identified   Dermal lymphovascular invasion:  Not identified   Microcalcifications:  Present in DCIS and nonneoplastic tissue   Treatment effect in the breast:  No known pre-surgical therapy   Treatment effect in the lymph nodes:  Not applicable   Margin status for invasive carcinoma:  All margins negative for invasive   carcinoma       Distance from invasive carcinoma closest posterior margin:  9 mm       Distance from invasive carcinoma to inferior margin:  26 mm       Distance from invasive carcinoma to superior margin:  45 mm   Margin status for DCIS:  All margins negative for DCIS       Distant from DCIS to closest posterior margin:  9 mm       Distance from DCIS to inferior margin:  26 mm       Distance from DCIS to superior margin:  45 mm   Regional lymph node status:  Tumor present in regional lymph nodes   Number of lymph nodes with macrometastases:  0   Number of lymph nodes with micrometastases:  0   Number of lymph nodes with isolated tumor cells:  1   Size of largest jose l metastatic deposit:  0.13 mm   Extranodal extension:  Not identified   Total number of lymph nodes examined (sentinel and nonsentinel):  2   Number of sentinel nodes examined:  2   Distant sites involved:  Not applicable   TNM descriptors:  Not applicable   pT category:  pT1b   Regional lymph nodes modifier:  (sn)   pN category:  (sn) pN0 (i+)   pM category:  Not applicable - pM cannot be determined from the submitted   specimens   Special studies:  Biomarkers, assessed by immunohistochemistry on prior right   breast biopsy  (KES-) with following reported results:       - Estrogen Receptor (ER):  Positive (100%; strong intensity)       - Progesterone Receptor (TN):  Negative (<1%)       - HER2:  Negative (1+)       - Ki-67 proliferation index:  12% (low)       Oncology History   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, estrogen receptor positive   4/13/2022 Initial Diagnosis    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, estrogen receptor positive     4/13/2022 Cancer Staged    Staging form: Breast, AJCC 8th Edition  - Clinical stage from 4/13/2022: Stage IA (cT1b, cN0, cM0, G2, ER+, TN-, HER2-)     7/19/2022 - 7/19/2022 Chemotherapy    Treatment Summary   Plan Name: OP BREAST TC - DOCETAXEL CYCLOPHOSPHAMIDE Q3W  Treatment Goal: Curative  Status: Inactive  Start Date:   End Date:   Provider: Idania Martins MD  Chemotherapy: cyclophosphamide 600 mg/m2 = 880 mg in sodium chloride 0.9% 250 mL chemo infusion, 600 mg/m2, Intravenous, Clinic/HOD 1 time, 0 of 4 cycles  DOCEtaxeL (TAXOTERE) 75 mg/m2 = 110 mg in sodium chloride 0.9% 250 mL chemo infusion, 75 mg/m2, Intravenous, Clinic/HOD 1 time, 0 of 4 cycles         History:  Past Medical History:   Diagnosis Date    Allergy     Amblyopia     Anemia     Anticoagulant long-term use     Arthritis 02/02/1992    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, estrogen receptor positive 04/13/2022    Cataract     Depression     Dry eyes     Dry mouth     Duane's syndrome of right eye     Early dry stage nonexudative age-related macular degeneration of both eyes 09/20/2022    Fibromyalgia 04/17/2014    Fibromyalgia     Fractured hip     RIGHT HIP    GERD (gastroesophageal reflux disease)     History of psychiatric hospitalization     Hyperlipidemia 02/02/1992    Hypertension     Hypocalcemia     Hyponatremia     Kidney stone     Migraine headache     Osteoporosis     Pressure ulcer of unspecified site, unspecified stage     Psoriatic  arthritis 1992    Right knee pain     post knee replacement surgery (possible rejectiion of metal)    RLS (restless legs syndrome)     Schizophrenia 1992    stable on meds    Sciatica     Squamous cell carcinoma of skin     Urinary tract infection       Past Surgical History:   Procedure Laterality Date    brow ptosis repair Right 2019    Surgeon: Dr. Karla Henson    CATARACT EXTRACTION       SECTION      COLONOSCOPY N/A 2016    Procedure: COLONOSCOPY;  Surgeon: ANDRIA Connelly MD;  Location: [facility] ([address] [place] [address]);  Service: Endoscopy;  Laterality: N/A;    COLONOSCOPY N/A 2022    Procedure: COLONOSCOPY;  Surgeon: Mikey Walters MD;  Location: [facility];  Service: Endoscopy;  Laterality: N/A;    cyst removed from right sinus  1982    EPIDURAL STEROID INJECTION INTO LUMBAR SPINE N/A 2023    Procedure: Injection-steroid-epidural-lumbar L5-S1;  Surgeon: Chirag Kim MD;  Location: [facility];  Service: Pain Management;  Laterality: N/A;  asa    ESOPHAGOGASTRODUODENOSCOPY N/A 2023    Procedure: EGD (ESOPHAGOGASTRODUODENOSCOPY);  Surgeon: Dougie Shea MD;  Location: [facility];  Service: Endoscopy;  Laterality: N/A;    HYSTERECTOMY      VAGINAL HYSTERECTOMY WITHOUT BSO - ENDOMETRIOSIS    INJECTION FOR SENTINEL NODE IDENTIFICATION Right 2022    Procedure: INJECTION, FOR SENTINEL NODE IDENTIFICATION-Right;  Surgeon: NEAL Dhillon MD;  Location: [facility] OR;  Service: General;  Laterality: Right;    INJECTION OF ANESTHETIC AGENT AROUND MEDIAL BRANCH NERVES INNERVATING LUMBAR FACET JOINT N/A 2019    Procedure: Lumbo-sacral Block, DR5 and Lateral Branches of S1,S2, S3;  Surgeon: Michelle Pineda Jr., MD;  Location: [facility];  Service: Pain Management;  Laterality: N/A;  Pt takes  and states she holds ASA on her own whenever she has procedures.  Instructed to hold x 3 days prior to procedure.      INJECTION OF ANESTHETIC AGENT AROUND MEDIAL  BRANCH NERVES INNERVATING LUMBAR FACET JOINT Bilateral 5/3/2023    Procedure: Block-nerve-medial branch-lumbar bilateral L3, L4, L5;  Surgeon: Maile Storm DO;  Location: [facility];  Service: Pain Management;  Laterality: Bilateral;  asa    INJECTION OF ANESTHETIC AGENT INTO SACROILIAC JOINT Right 8/30/2018    Procedure: BLOCK, SACROILIAC JOINT-Right- ORAL SEDATION;  Surgeon: Michelle Pineda Jr., MD;  Location: [facility];  Service: Pain Management;  Laterality: Right;    INJECTION OF ANESTHETIC AGENT INTO SACROILIAC JOINT Bilateral 9/27/2018    Procedure: BLOCK, SACROILIAC JOINT-BILATERAL;  Surgeon: Michelle Pineda Jr., MD;  Location: [facility];  Service: Pain Management;  Laterality: Bilateral;  Please keep at 10:00 due to trasnsportation    INJECTION OF ANESTHETIC AGENT INTO SACROILIAC JOINT Bilateral 2/7/2019    Procedure: Bilateral Sacroiliac Joint Injection - Per Dr Pineda, not necessary to hold ASA.;  Surgeon: Michelle Pineda Jr., MD;  Location: [facility];  Service: Pain Management;  Laterality: Bilateral;    INTRAOCULAR PROSTHESES INSERTION Right 8/1/2019    Procedure: INSERTION, IOL PROSTHESIS;  Surgeon: Karen Song MD;  Location: [address];  Service: Ophthalmology;  Laterality: Right;    INTRAOCULAR PROSTHESES INSERTION Left 9/26/2019    Procedure: INSERTION, IOL PROSTHESIS;  Surgeon: Karen Song MD;  Location: [address];  Service: Ophthalmology;  Laterality: Left;    JOINT REPLACEMENT Right     knee    KNEE SURGERY      MASTECTOMY Right 5/9/2022    Procedure: MASTECTOMY-Right;  Surgeon: NEAL Dhillon MD;  Location: [facility];  Service: General;  Laterality: Right;  2.5 HOURS    PHACOEMULSIFICATION OF CATARACT Right 8/1/2019    Procedure: PHACOEMULSIFICATION, CATARACT;  Surgeon: Karen Song MD;  Location: [address];  Service: Ophthalmology;  Laterality: Right;    PHACOEMULSIFICATION OF CATARACT Left 9/26/2019    Procedure:  PHACOEMULSIFICATION, CATARACT;  Surgeon: Karen Song MD;  Location: [address];  Service: Ophthalmology;  Laterality: Left;    RADIOFREQUENCY THERMOCOAGULATION Right 5/7/2019    Procedure: RADIOFREQUENCY THERMAL COAGULATION RIGHT DORSAL RAMUS 5 AND LATERAL BRANCH OF S1, S2 AND S3;  Surgeon: Michelle Pineda Jr., MD;  Location: [facility];  Service: Pain Management;  Laterality: Right;    RADIOFREQUENCY THERMOCOAGULATION Left 5/14/2019    Procedure: RADIOFREQUENCY THERMAL COAGULATION LEFT DORSAL RAMUS 5 AND LATERAL BRANCH OF S1,S2 AND S3;  Surgeon: Michelle Pineda Jr., MD;  Location: [facility];  Service: Pain Management;  Laterality: Left;    RADIOFREQUENCY THERMOCOAGULATION Right 10/22/2019    Procedure: RADIOFREQUENCY THERMAL COAGULATION---DARSAL RAMUS 5 and LATERAL S1,S2,and S3 Right;  Surgeon: Michelle Pineda Jr., MD;  Location: [facility];  Service: Pain Management;  Laterality: Right;  patient to sign consent DOS    RADIOFREQUENCY THERMOCOAGULATION Left 10/29/2019    Procedure: RADIOFREQUENCY THERMAL COAGULATION - LEFT - DR5, S1,S2, AND S3;  Surgeon: Michelle Pineda Jr., MD;  Location: [facility];  Service: Pain Management;  Laterality: Left;    RADIOFREQUENCY THERMOCOAGULATION Left 5/26/2020    Procedure: RADIOFREQUENCY THERMAL COAGULATION--Left DR5+ lateral branches of S1, S2, S3;  Surgeon: Michelle Pineda Jr., MD;  Location: [facility];  Service: Pain Management;  Laterality: Left;    RADIOFREQUENCY THERMOCOAGULATION Right 6/2/2020    Procedure: RADIOFREQUENCY THERMAL COAGULATION--Right DR5+ lateral branches of S1, S2, S3;  Surgeon: Michelle Pineda Jr., MD;  Location: [facility];  Service: Pain Management;  Laterality: Right;    SENTINEL LYMPH NODE BIOPSY Right 5/9/2022    Procedure: BIOPSY, LYMPH NODE, SENTINEL-Right;  Surgeon: NEAL Dhillon MD;  Location: [facility];  Service: General;  Laterality: Right;    Surgery on right knee  07/09/1982    tumor removed from  back left side upper shoulder  2006     Family History   Problem Relation Age of Onset    Colon cancer Mother     Psoriasis Mother     Cancer Mother         colon    Depression Mother     Cancer Father         lymphoma    Stroke Sister     Diabetes Brother     Arthritis Brother     Heart disease Brother         cad    No Known Problems Daughter     Hypertension Neg Hx     Suicide Neg Hx     Amblyopia Neg Hx     Blindness Neg Hx     Cataracts Neg Hx     Glaucoma Neg Hx     Macular degeneration Neg Hx     Retinal detachment Neg Hx     Strabismus Neg Hx       Social History     Tobacco Use    Smoking status: Every Day     Current packs/day: 0.00     Average packs/day: 0.3 packs/day for 10.0 years (2.5 ttl pk-yrs)     Types: Cigarettes     Start date: 1/10/2013     Last attempt to quit: 1/10/2023     Years since quittin.5     Passive exposure: Never    Smokeless tobacco: Former    Tobacco comments:     about 5 cig a day   Substance and Sexual Activity    Alcohol use: No    Drug use: No    Sexual activity: Not Currently     Partners: Male     Birth control/protection: Post-menopausal        ROS:   Review of Systems   Constitutional:  Positive for weight loss. Negative for fever and malaise/fatigue.   HENT:  Positive for congestion. Negative for ear pain, nosebleeds and sore throat.    Eyes:  Negative for blurred vision, double vision and photophobia.   Respiratory:  Negative for cough, hemoptysis, sputum production, shortness of breath, wheezing and stridor.    Cardiovascular:  Negative for chest pain, palpitations, orthopnea and leg swelling.   Gastrointestinal:  Negative for abdominal pain, blood in stool, constipation, diarrhea, heartburn, melena, nausea and vomiting.   Genitourinary:  Negative for dysuria and hematuria.   Musculoskeletal:  Positive for back pain and joint pain (left hip with recent exacerbation). Negative for myalgias and neck pain.   Skin:  Negative for itching and rash.   Neurological:   "Negative for dizziness, focal weakness, seizures, weakness and headaches.   Endo/Heme/Allergies:  Negative for polydipsia. Does not bruise/bleed easily.   Psychiatric/Behavioral:  Positive for depression. Negative for memory loss. The patient is nervous/anxious. The patient does not have insomnia.         Objective:     Vitals:    08/08/23 0916   BP: (!) 151/66   Pulse: 75   Temp: 97.9 °F (36.6 °C)   SpO2: 97%   Weight: 44.3 kg (97 lb 10.6 oz)   Height: 5' 2" (1.575 m)   PainSc: 0-No pain           Physical Examination:   Physical Exam  Vitals and nursing note reviewed.   Constitutional:       General: She is not in acute distress.     Appearance: She is not diaphoretic.   HENT:      Head: Normocephalic.      Mouth/Throat:      Pharynx: No oropharyngeal exudate.   Eyes:      General: No scleral icterus.     Conjunctiva/sclera: Conjunctivae normal.   Neck:      Thyroid: No thyromegaly.   Cardiovascular:      Rate and Rhythm: Normal rate and regular rhythm.      Heart sounds: Normal heart sounds. No murmur heard.  Pulmonary:      Effort: Pulmonary effort is normal. No respiratory distress.      Breath sounds: No stridor. No wheezing or rhonchi.   Abdominal:      General: Bowel sounds are normal. There is no distension.      Palpations: Abdomen is soft. There is no mass.      Tenderness: There is no abdominal tenderness. There is no rebound.   Musculoskeletal:         General: No deformity. Normal range of motion.      Cervical back: Neck supple.   Lymphadenopathy:      Cervical: No cervical adenopathy.   Skin:     General: Skin is warm and dry.      Findings: No erythema or rash.   Neurological:      Mental Status: She is alert and oriented to person, place, and time.      Cranial Nerves: No cranial nerve deficit.      Coordination: Coordination normal.      Gait: Gait is intact.   Psychiatric:         Mood and Affect: Affect normal.         Cognition and Memory: Memory normal.         Judgment: Judgment normal.      "     Diagnostic Tests:  Significant Imaging: I have reviewed and interpreted all pertinent imaging results/findings.    Laboratory Data:  All pertinent labs have been reviewed.    Labs:   Lab Results   Component Value Date    WBC 8.55 06/23/2023    HGB 12.1 06/23/2023    HCT 37.6 06/23/2023    MCV 98 06/23/2023     06/23/2023       Assessment/Plan:   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, estrogen receptor positive  pT1b N0 (i+)  grade 2 ER + OH - HER2 - IDC of the right breast.    She is status post mastectomy and sentinel node exam for IDC as well as DCIS of the right breast.     Oncotype returned high risk, which confers a benefit ot the addition of chemotherapy to AI.   RSclin showed an 8% risk of recurrence with 4% absolute benefit of chemotherapy. She has declined chemotherapy.     Tolerating exemestane relatively well. Continue at this time. See below.   Continue Zometa q 6 months for bone health and possible risk reduction for breast cancer,next due in November.    Continue active surveillance. Mammogram due lMay 2024, will see her back in 3 months.     Osteopenia of multiple sites  Diagnosed with osteopenia/osteoporosis given hip fracture in 2020. Follows up with Dr. Cornelius and is on zoledronic acid, now receiving q 6 months.   Continues on MTX weekly  Follow up bone density scan is normal.     Hip pain   Has degenerative changes L3-L5. Declines surgical evaluation at this time. Continue PT, continue to monitor.     Aromatase inhibitor arthralgias   Continue supportive care. She declined acupuncture referral previously.     Colon Polyps  TONIE  Recent colonoscopy 12/22, path did not show any malignancy.   EGD completed 2/23    Weight loss  Stabilizing, further work up as clinically indicated.     Atrial fibrillation   Atherosclerosis of aorta   On aspirin, follows up with Cardiology         ECOG SCORE    0 - Fully active-able to carry on all pre-disease performance without  restriction           Discussion:         Med Onc Chart Routing      Follow up with physician 3 months. scheduled   Follow up with LATOYA    Infusion scheduling note    Injection scheduling note    Labs    Imaging    Pharmacy appointment    Other referrals

## 2023-08-09 ENCOUNTER — HOSPITAL ENCOUNTER (OUTPATIENT)
Dept: RADIOLOGY | Facility: HOSPITAL | Age: 68
Discharge: HOME | End: 2023-08-09
Attending: INTERNAL MEDICINE
Payer: MEDICARE

## 2023-08-09 DIAGNOSIS — M54.2 CERVICALGIA: ICD-10-CM

## 2023-08-09 PROCEDURE — 72052 XR CERVICAL SPINE 5 VIEW WITH FLEX AND EXT: ICD-10-PCS | Mod: 26,,, | Performed by: RADIOLOGY

## 2023-08-09 PROCEDURE — 72052 X-RAY EXAM NECK SPINE 6/>VWS: CPT | Mod: TC,FY,PO

## 2023-08-09 PROCEDURE — 72052 X-RAY EXAM NECK SPINE 6/>VWS: CPT | Mod: 26,,, | Performed by: RADIOLOGY

## 2023-08-10 DIAGNOSIS — Z17.0 CARCINOMA OF UPPER-OUTER QUADRANT OF RIGHT BREAST IN FEMALE, ESTROGEN RECEPTOR POSITIVE: ICD-10-CM

## 2023-08-10 DIAGNOSIS — C50.411 CARCINOMA OF UPPER-OUTER QUADRANT OF RIGHT BREAST IN FEMALE, ESTROGEN RECEPTOR POSITIVE: ICD-10-CM

## 2023-08-10 NOTE — TELEPHONE ENCOUNTER
----- Message from Alannah Fields sent at 8/10/2023 10:32 AM CDT -----  Regarding: refill  Contact: pt [phone]  Pt calling to schedule refill on traMADoL (ULTRAM) 50 mg tablet      [id number] - [place]  [address] [facility]  [address] [address]  Phone: [phone] Fax: [phone]

## 2023-08-10 NOTE — TELEPHONE ENCOUNTER
----- Message from Neela Pabon sent at 8/10/2023  1:43 PM CDT -----  Contact: [phone]  Requesting an RX refill or new RX. new  Is this a refill or new RX:  new    RX name and strength (copy/paste from chart):  methocarbamoL (ROBAXIN) 500 MG Tab 60 tablet     Is this a 30 day or 90 day RX: 30    Pharmacy name and phone # (copy/paste from chart):        [id number] - [place] AT [facility] [address]  [address] [address]  Phone: [phone] Fax: [phone]    
No care due was identified.  [facility] Embedded Care Due Messages. Reference number: [id number].   8/10/2023 2:22:59 PM CDT  
Refill Routing Note   Medication(s) are not appropriate for processing by [facility] for the following reason(s):      Medication outside of protocol : methocarbamoL     [facility] action(s):  Route Care Due:  None identified              Appointments  past 12m or future 3m with PCP    Date Provider   Last Visit   6/6/2023 Sadaf Vargas MD   Next Visit   Visit date not found Sadaf Vargas MD   ED visits in past 90 days: 0        Note composed:3:43 PM 08/10/2023        
no

## 2023-08-11 RX ORDER — TRAMADOL HYDROCHLORIDE 50 MG/1
50 TABLET ORAL EVERY 6 HOURS PRN
Qty: 30 EACH | Refills: 0 | Status: SHIPPED | OUTPATIENT
Start: 2023-08-11 | End: 2023-09-13 | Stop reason: SDUPTHER

## 2023-08-12 RX ORDER — METHOCARBAMOL 500 MG/1
TABLET, FILM COATED ORAL
Qty: 60 TABLET | Refills: 1 | Status: SHIPPED | OUTPATIENT
Start: 2023-08-12 | End: 2023-08-22

## 2023-08-13 DIAGNOSIS — D50.0 IRON DEFICIENCY ANEMIA DUE TO CHRONIC BLOOD LOSS: Primary | ICD-10-CM

## 2023-08-21 ENCOUNTER — TELEPHONE (OUTPATIENT)
Dept: PAIN MEDICINE | Facility: CLINIC | Age: 68
End: 2023-08-21
Payer: MEDICARE

## 2023-08-21 NOTE — TELEPHONE ENCOUNTER
----- Message from Josi Worrell sent at 8/19/2023 10:14 AM CDT -----  Regarding: CAll back requested  Contact: [phone]  Hi, pt called to request a call back from the office to discuss something with the nurse. Pls call the pt at [phone] to spk with the pt.

## 2023-08-22 ENCOUNTER — OFFICE VISIT (OUTPATIENT)
Dept: RHEUMATOLOGY | Facility: CLINIC | Age: 68
End: 2023-08-22
Payer: MEDICARE

## 2023-08-22 ENCOUNTER — HOSPITAL ENCOUNTER (OUTPATIENT)
Dept: VASCULAR SURGERY | Facility: CLINIC | Age: 68
Discharge: HOME | End: 2023-08-22
Attending: INTERNAL MEDICINE
Payer: MEDICARE

## 2023-08-22 VITALS
DIASTOLIC BLOOD PRESSURE: 72 MMHG | HEART RATE: 73 BPM | HEIGHT: 62 IN | SYSTOLIC BLOOD PRESSURE: 126 MMHG | BODY MASS INDEX: 18.22 KG/M2 | WEIGHT: 99 LBS

## 2023-08-22 DIAGNOSIS — K21.9 GASTROESOPHAGEAL REFLUX DISEASE, UNSPECIFIED WHETHER ESOPHAGITIS PRESENT: ICD-10-CM

## 2023-08-22 DIAGNOSIS — M47.816 LUMBAR SPONDYLOSIS: ICD-10-CM

## 2023-08-22 DIAGNOSIS — L40.50 PSORIATIC ARTHRITIS: ICD-10-CM

## 2023-08-22 DIAGNOSIS — R60.0 LEG EDEMA, LEFT: ICD-10-CM

## 2023-08-22 DIAGNOSIS — K31.89 EROSIVE GASTROPATHY: ICD-10-CM

## 2023-08-22 DIAGNOSIS — M47.812 CERVICAL SPONDYLOSIS: Primary | ICD-10-CM

## 2023-08-22 PROCEDURE — 1159F MED LIST DOCD IN RCRD: CPT | Mod: CPTII,S$GLB,, | Performed by: INTERNAL MEDICINE

## 2023-08-22 PROCEDURE — 99999 PR PBB SHADOW E&M-EST. PATIENT-LVL III: ICD-10-PCS | Mod: PBBFAC,,, | Performed by: INTERNAL MEDICINE

## 2023-08-22 PROCEDURE — 1125F AMNT PAIN NOTED PAIN PRSNT: CPT | Mod: CPTII,S$GLB,, | Performed by: INTERNAL MEDICINE

## 2023-08-22 PROCEDURE — 3074F SYST BP LT 130 MM HG: CPT | Mod: CPTII,S$GLB,, | Performed by: INTERNAL MEDICINE

## 2023-08-22 PROCEDURE — 3078F PR MOST RECENT DIASTOLIC BLOOD PRESSURE < 80 MM HG: ICD-10-PCS | Mod: CPTII,S$GLB,, | Performed by: INTERNAL MEDICINE

## 2023-08-22 PROCEDURE — 99214 PR OFFICE/OUTPT VISIT, EST, LEVL IV, 30-39 MIN: ICD-10-PCS | Mod: S$GLB,,, | Performed by: INTERNAL MEDICINE

## 2023-08-22 PROCEDURE — 3008F BODY MASS INDEX DOCD: CPT | Mod: CPTII,S$GLB,, | Performed by: INTERNAL MEDICINE

## 2023-08-22 PROCEDURE — 3078F DIAST BP <80 MM HG: CPT | Mod: CPTII,S$GLB,, | Performed by: INTERNAL MEDICINE

## 2023-08-22 PROCEDURE — 1101F PT FALLS ASSESS-DOCD LE1/YR: CPT | Mod: CPTII,S$GLB,, | Performed by: INTERNAL MEDICINE

## 2023-08-22 PROCEDURE — 3008F PR BODY MASS INDEX (BMI) DOCUMENTED: ICD-10-PCS | Mod: CPTII,S$GLB,, | Performed by: INTERNAL MEDICINE

## 2023-08-22 PROCEDURE — 93971 EXTREMITY STUDY: CPT | Mod: S$GLB,,, | Performed by: SURGERY

## 2023-08-22 PROCEDURE — 3288F PR FALLS RISK ASSESSMENT DOCUMENTED: ICD-10-PCS | Mod: CPTII,S$GLB,, | Performed by: INTERNAL MEDICINE

## 2023-08-22 PROCEDURE — 99214 OFFICE O/P EST MOD 30 MIN: CPT | Mod: S$GLB,,, | Performed by: INTERNAL MEDICINE

## 2023-08-22 PROCEDURE — 1159F PR MEDICATION LIST DOCUMENTED IN MEDICAL RECORD: ICD-10-PCS | Mod: CPTII,S$GLB,, | Performed by: INTERNAL MEDICINE

## 2023-08-22 PROCEDURE — 1125F PR PAIN SEVERITY QUANTIFIED, PAIN PRESENT: ICD-10-PCS | Mod: CPTII,S$GLB,, | Performed by: INTERNAL MEDICINE

## 2023-08-22 PROCEDURE — 99999 PR PBB SHADOW E&M-EST. PATIENT-LVL III: CPT | Mod: PBBFAC,,, | Performed by: INTERNAL MEDICINE

## 2023-08-22 PROCEDURE — 1101F PR PT FALLS ASSESS DOC 0-1 FALLS W/OUT INJ PAST YR: ICD-10-PCS | Mod: CPTII,S$GLB,, | Performed by: INTERNAL MEDICINE

## 2023-08-22 PROCEDURE — 4010F ACE/ARB THERAPY RXD/TAKEN: CPT | Mod: CPTII,S$GLB,, | Performed by: INTERNAL MEDICINE

## 2023-08-22 PROCEDURE — 4010F PR ACE/ARB THEARPY RXD/TAKEN: ICD-10-PCS | Mod: CPTII,S$GLB,, | Performed by: INTERNAL MEDICINE

## 2023-08-22 PROCEDURE — 3288F FALL RISK ASSESSMENT DOCD: CPT | Mod: CPTII,S$GLB,, | Performed by: INTERNAL MEDICINE

## 2023-08-22 PROCEDURE — 3074F PR MOST RECENT SYSTOLIC BLOOD PRESSURE < 130 MM HG: ICD-10-PCS | Mod: CPTII,S$GLB,, | Performed by: INTERNAL MEDICINE

## 2023-08-22 PROCEDURE — 93971 PR US DUPLEX, UPPER OR LOWER EXT VENOUS,UNILAT OR LTD: ICD-10-PCS | Mod: S$GLB,,, | Performed by: SURGERY

## 2023-08-22 RX ORDER — METHOTREXATE 2.5 MG/1
20 TABLET ORAL
Qty: 96 TABLET | Refills: 0 | Status: SHIPPED | OUTPATIENT
Start: 2023-08-22 | End: 2023-12-05 | Stop reason: SDUPTHER

## 2023-08-22 RX ORDER — FOLIC ACID 1 MG/1
1000 TABLET ORAL DAILY
Qty: 90 TABLET | Refills: 3 | Status: SHIPPED | OUTPATIENT
Start: 2023-08-22 | End: 2024-02-23 | Stop reason: SDUPTHER

## 2023-08-22 ASSESSMENT — ROUTINE ASSESSMENT OF PATIENT INDEX DATA (RAPID3)
MDHAQ FUNCTION SCORE: 0.6
PAIN SCORE: 7
AM STIFFNESS SCORE: 1, YES
PSYCHOLOGICAL DISTRESS SCORE: 2.2
WHEN YOU AWAKENED IN THE MORNING OVER THE LAST WEEK, PLEASE INDICATE THE AMOUNT OF TIME IT TAKES UNTIL YOU ARE AS LIMBER AS YOU WILL BE FOR THE DAY: 1 HOUR
FATIGUE SCORE: 8
TOTAL RAPID3 SCORE: 5.5
PATIENT GLOBAL ASSESSMENT SCORE: 7.5

## 2023-08-22 NOTE — PROGRESS NOTES
Subjective:      Patient ID: Bhavna Landry is a 68 y.o. female.    Chief Complaint: PsA; psoriasis; OA; osteoporosis    HPI having neck pain with radiation left dorsal trap. No radicular symptoms.  Some stiffness in hands. No joint swelling. Only tiny spots of psoriasis heels, nowhere else.   Review of Systems   Constitutional:  Positive for fatigue (8/10) and unexpected weight change (no change by scales since May(99#)). Negative for appetite change and fever.   HENT:  Negative for mouth sores.         Dry mouth   Eyes:  Negative for visual disturbance.        Dry   Respiratory:  Negative for cough, shortness of breath and wheezing.    Cardiovascular:  Negative for chest pain and palpitations.   Gastrointestinal:  Positive for constipation. Negative for abdominal pain, anal bleeding, blood in stool, diarrhea, nausea and vomiting.   Genitourinary:  Negative for dysuria, frequency and urgency.   Musculoskeletal:  Negative for arthralgias, back pain, gait problem, joint swelling, myalgias, neck pain and neck stiffness.   Skin:  Positive for color change. Negative for rash.   Neurological:  Positive for headaches. Negative for weakness and numbness.   Hematological:  Negative for adenopathy. Bruises/bleeds easily.   Psychiatric/Behavioral:  Negative for sleep disturbance. The patient is not nervous/anxious.         Objective:   There were no vitals taken for this visit.  Physical Exam   Musculoskeletal:      Left lower leg: Edema present.   Skin:   1 cm psoriasis dorsoplantar heels only       Right Side Rheumatological Exam     The patient has an enlarged 1st PIP, 2nd PIP, 3rd PIP, 4th PIP and 5th PIP    Left Side Rheumatological Exam     The patient has an enlarged 1st PIP, 2nd PIP, 3rd PIP, 4th PIP and 5th PIP.              10/25/2021 2/7/2022 1/12/2023 5/23/2023   Tender (CLARK-28) 0 / 28  10 / 28  10 / 28  0 / 28    Swollen (CLARK-28) 1 / 28  4 / 28  3 / 28  2 / 28    Provider Global 0 mm 20 mm 10 mm 10 mm   Patient  Global 50 mm 55 mm 50 mm 30 mm   ESR -- 3 mm/hr 10 mm/hr 9 mm/hr   CRP -- 0.3 mg/L 0.5 mg/L 0.3 mg/L   CLARK-28 (ESR) -- 3.87 (Moderate disease activity) 4.57 (Moderate disease activity) 2.35 (Remission)   CALRK-28 (CRP) -- 4.16 (Moderate disease activity) 4.06 (Moderate disease activity) 1.87 (Remission)   CDAI Score 6  21.5  19  6          Latest Reference Range & Units 08/09/23 10:03   WBC 3.90 - 12.70 K/uL 7.83   RBC 4.00 - 5.40 M/uL 3.94 (L)   Hemoglobin 12.0 - 16.0 g/dL 11.9 (L)   Hematocrit 37.0 - 48.5 % 37.7   MCV 82 - 98 fL 96   MCH 27.0 - 31.0 pg 30.2   MCHC 32.0 - 36.0 g/dL 31.6 (L)   RDW 11.5 - 14.5 % 15.7 (H)   Platelets 150 - 450 K/uL 319   MPV 9.2 - 12.9 fL 8.8 (L)   Gran % 38.0 - 73.0 % 67.1   Lymph % 18.0 - 48.0 % 21.6   Mono % 4.0 - 15.0 % 8.2   Eosinophil % 0.0 - 8.0 % 2.4   Basophil % 0.0 - 1.9 % 0.6   Immature Granulocytes 0.0 - 0.5 % 0.1   Gran # (ANC) 1.8 - 7.7 K/uL 5.3   Lymph # 1.0 - 4.8 K/uL 1.7   Mono # 0.3 - 1.0 K/uL 0.6   Eos # 0.0 - 0.5 K/uL 0.2   Baso # 0.00 - 0.20 K/uL 0.05   Immature Grans (Abs) 0.00 - 0.04 K/uL 0.01   nRBC 0 /100 WBC 0   Differential Method  Automated   Iron 30 - 160 ug/dL 114   TIBC 250 - 450 ug/dL 428   Saturated Iron 20 - 50 % 27   Transferrin 200 - 375 mg/dL 289   Ferritin 20.0 - 300.0 ng/mL 23   Sed Rate 0 - 36 mm/Hr <2   Sodium 136 - 145 mmol/L 133 (L)   Potassium 3.5 - 5.1 mmol/L 4.3   Chloride 95 - 110 mmol/L 97   CO2 23 - 29 mmol/L 27   Anion Gap 8 - 16 mmol/L 9   BUN 8 - 23 mg/dL 6 (L)   Creatinine 0.5 - 1.4 mg/dL 0.9   eGFR >60 mL/min/1.73 m^2 >60.0   Glucose 70 - 110 mg/dL 83   Calcium 8.7 - 10.5 mg/dL 9.1   Alkaline Phosphatase 55 - 135 U/L 63   PROTEIN TOTAL 6.0 - 8.4 g/dL 6.1   Albumin 3.5 - 5.2 g/dL 3.7   BILIRUBIN TOTAL 0.1 - 1.0 mg/dL 0.3   AST 10 - 40 U/L 19   ALT 10 - 44 U/L 12   CRP 0.0 - 8.2 mg/L 1.9   (L): Data is abnormally low  (H): Data is abnormally high    EXAMINATION:  XR ANKLE COMPLETE 3 VIEW LEFT     CLINICAL HISTORY:  Pain in left ankle  and joints of left foot     TECHNIQUE:  AP, lateral and oblique views of the left ankle were performed.     COMPARISON:  05/27/2019     FINDINGS:  Ankle mortise is preserved.  Bony structures are intact with no significant abnormalities identified.     Impression:     See above        Electronically signed by: Rosendo Laboy MD  Date:                                            05/24/2023    EXAMINATION:  XR CERVICAL SPINE 5 VIEW WITH FLEX AND EXT     CLINICAL HISTORY:  Cervicalgia     FINDINGS:  Cervical spine five views: There is a dextroconvex curvature.  There is plaque of the carotid arteries.  There is moderate DJD between C3 and C7.  Odontoid, prevertebral soft tissues, and posterior elements are intact.  No acute fracture dislocation bone destruction seen.  There is instability of C2 on C3.  There is anterolisthesis of C6 on C7.  There is bilateral neural foraminal narrowing between C3 and T1.     Impression:     Degenerative changes as above.        Electronically signed by: John Bazan MD  Date:                                            08/09/2023  Time:                                           10:21      Saw Dr. Shea in Gastro 6/8/23:    Assessment:  1. NSAID-associated gastropathy    2. Gastroesophageal reflux disease, unspecified whether esophagitis present    3. Erosive gastropathy          Plan:      Orders Placed This Encounter    omeprazole (PRILOSEC) 20 MG capsule         I would favor continuing omeprazole 20 indefinitely given her still sparingly NSAID use  We discussed avoiding , as possible ,as much NSAIDs as we can     Repeat cbc and iron based on PCP recs.     Repeat colonoscopy for polyps in about 3 years        RTC prn     Dougie Shea MD  [facility] [place]    Assessment:     Cervical demyelination with etanercept in past, no anti-TNF  Failed secukinumab  Failed sulfasalazine  Did not feel tofacitinib helped and stopped after visit of 2/7/22 and not  refilled( 22), but patient apparently has been getting automatic refills without refill approvals.   Guselkumab ordered 22 never filled        PsA  TJ 0 SJ 0  Pt global 75 ESR <2  CRP 1.9 DAS28 1.04  CKB63-[facility] 2.39 (both remission) CDAI 7.5(LDA)  DAPSA  TJ 0 SJ 0 Pt global 7.5 Pt pain 7  CRP  0.19 = 14.69(MDA) no need to add guselkumab just now.   Osteoporosis DXA 22 FRAX major 13.5 % hip 3.1%, s/p hip fracture;received  Reclast zoledronic acid(5mg IV) 20, 3/17/22. Now on Zometa (zoledronic acid) (4mg IV) q 6 months for breast cancer from Dr. Marcelino  so no need for Reclast   Psoriasis, tiny lesions heels only  OA hands  Breast cancer on anastrozole  /72  Left leg edema  Cervival spondylosis, neck pain  Plan:   Vasc lab of venous US for left leg edema  Ref to PT for neck   Ref to [facility] for neck  methotrexate to 20mg po every Monday, divided dose  Folic acid 1mg daily  Diclofenac gel to left ankle  Omeprazole 20mg before breakfast  Continue Zometa 4mg q 6 months per Dr. Marcelino in [facility]  No  need to add guselkumab 100mg sc wk 0,4 then q 8 wks. If OK with Dr. Marcelino her oncologist   RTC 3 months with standing labs

## 2023-08-23 NOTE — PROGRESS NOTES
DATE: 8/29/2023  PATIENT: Bhavna Landry    Supervising Physician: Binh Ho M.D.    CHIEF COMPLAINT: neck pain    HISTORY:  Bhavna Landry is a 68 y.o. female with pmhx of RA here for initial evaluation of neck and left arm pain (Neck - 5, Arm - 6). The pain has been present for 2 years. The patient describes the pain as dull. The pain is worse with any movement and improved by nothing. There is associated numbness and tingling. There is no subjective weakness. Prior treatments have included tylenol, gabapentin, tylenol and diclofenac, but no SOPHY or surgery. The patient is established with Dr. Galaviz for low back pain.      The patient denies myelopathic symptoms such as handwriting changes or difficulty with buttons/coins/keys. Denies perineal paresthesias, bowel/bladder dysfunction.    PAST MEDICAL/SURGICAL HISTORY:  Past Medical History:   Diagnosis Date    Allergy     Amblyopia     Anemia     Anticoagulant long-term use     Arthritis 02/02/1992    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, estrogen receptor positive 04/13/2022    Cataract     Depression     Dry eyes     Dry mouth     Duane's syndrome of right eye     Early dry stage nonexudative age-related macular degeneration of both eyes 09/20/2022    Fibromyalgia 04/17/2014    Fibromyalgia     Fractured hip     RIGHT HIP    GERD (gastroesophageal reflux disease)     History of psychiatric hospitalization     Hyperlipidemia 02/02/1992    Hypertension     Hypocalcemia     Hyponatremia     Kidney stone     Migraine headache     Osteoporosis     Pressure ulcer of unspecified site, unspecified stage     Psoriatic arthritis 02/02/1992    Right knee pain     post knee replacement surgery (possible rejectiion of metal)    RLS (restless legs syndrome)     Schizophrenia 02/02/1992    stable on meds    Sciatica     Squamous cell carcinoma of skin     Urinary tract infection      Past Surgical History:   Procedure Laterality Date    brow  ptosis repair Right 2019    Surgeon: Dr. Karla Henson    CATARACT EXTRACTION       SECTION      COLONOSCOPY N/A 2016    Procedure: COLONOSCOPY;  Surgeon: ANDRIA Connelly MD;  Location: [facility] ENDO ([facility] FLR);  Service: Endoscopy;  Laterality: N/A;    COLONOSCOPY N/A 2022    Procedure: COLONOSCOPY;  Surgeon: Mikey Walters MD;  Location: [facility] ENDO;  Service: Endoscopy;  Laterality: N/A;    cyst removed from right sinus  1982    EPIDURAL STEROID INJECTION INTO LUMBAR SPINE N/A 2023    Procedure: Injection-steroid-epidural-lumbar L5-S1;  Surgeon: Chirag Kim MD;  Location: [facility] PAIN MANAGEMENT;  Service: Pain Management;  Laterality: N/A;  asa    ESOPHAGOGASTRODUODENOSCOPY N/A 2023    Procedure: EGD (ESOPHAGOGASTRODUODENOSCOPY);  Surgeon: Dougie Shea MD;  Location: [facility];  Service: Endoscopy;  Laterality: N/A;    HYSTERECTOMY      VAGINAL HYSTERECTOMY WITHOUT BSO - ENDOMETRIOSIS    INJECTION FOR SENTINEL NODE IDENTIFICATION Right 2022    Procedure: INJECTION, FOR SENTINEL NODE IDENTIFICATION-Right;  Surgeon: NEAL Dhillon MD;  Location: [facility] OR;  Service: General;  Laterality: Right;    INJECTION OF ANESTHETIC AGENT AROUND MEDIAL BRANCH NERVES INNERVATING LUMBAR FACET JOINT N/A 2019    Procedure: Lumbo-sacral Block, DR5 and Lateral Branches of S1,S2, S3;  Surgeon: Michelle Pineda Jr., MD;  Location: [facility] PAIN MGT;  Service: Pain Management;  Laterality: N/A;  Pt takes  and states she holds ASA on her own whenever she has procedures.  Instructed to hold x 3 days prior to procedure.      INJECTION OF ANESTHETIC AGENT AROUND MEDIAL BRANCH NERVES INNERVATING LUMBAR FACET JOINT Bilateral 5/3/2023    Procedure: Block-nerve-medial branch-lumbar bilateral L3, L4, L5;  Surgeon: Maile Storm DO;  Location: [facility] PAIN MGT;  Service: Pain Management;  Laterality: Bilateral;  asa    INJECTION OF ANESTHETIC AGENT INTO SACROILIAC JOINT Right 2018     Procedure: BLOCK, SACROILIAC JOINT-Right- ORAL SEDATION;  Surgeon: Michelle Pineda Jr., MD;  Location: [facility] PAIN MGT;  Service: Pain Management;  Laterality: Right;    INJECTION OF ANESTHETIC AGENT INTO SACROILIAC JOINT Bilateral 9/27/2018    Procedure: BLOCK, SACROILIAC JOINT-BILATERAL;  Surgeon: Michelle Pineda Jr., MD;  Location: [facility] PAIN MGT;  Service: Pain Management;  Laterality: Bilateral;  Please keep at 10:00 due to trasnsportation    INJECTION OF ANESTHETIC AGENT INTO SACROILIAC JOINT Bilateral 2/7/2019    Procedure: Bilateral Sacroiliac Joint Injection - Per Dr Pineda, not necessary to hold ASA.;  Surgeon: Michelle Pineda Jr., MD;  Location: [facility] PAIN MGT;  Service: Pain Management;  Laterality: Bilateral;    INTRAOCULAR PROSTHESES INSERTION Right 8/1/2019    Procedure: INSERTION, IOL PROSTHESIS;  Surgeon: Karen Song MD;  Location: [address];  Service: Ophthalmology;  Laterality: Right;    INTRAOCULAR PROSTHESES INSERTION Left 9/26/2019    Procedure: INSERTION, IOL PROSTHESIS;  Surgeon: Karen Song MD;  Location: [address];  Service: Ophthalmology;  Laterality: Left;    JOINT REPLACEMENT Right     knee    KNEE SURGERY      MASTECTOMY Right 5/9/2022    Procedure: MASTECTOMY-Right;  Surgeon: NEAL Dhillon MD;  Location: [facility];  Service: General;  Laterality: Right;  2.5 HOURS    PHACOEMULSIFICATION OF CATARACT Right 8/1/2019    Procedure: PHACOEMULSIFICATION, CATARACT;  Surgeon: Karen Song MD;  Location: [address];  Service: Ophthalmology;  Laterality: Right;    PHACOEMULSIFICATION OF CATARACT Left 9/26/2019    Procedure: PHACOEMULSIFICATION, CATARACT;  Surgeon: aKren Song MD;  Location: [address];  Service: Ophthalmology;  Laterality: Left;    RADIOFREQUENCY THERMOCOAGULATION Right 5/7/2019    Procedure: RADIOFREQUENCY THERMAL COAGULATION RIGHT DORSAL RAMUS 5 AND LATERAL BRANCH OF S1, S2 AND S3;  Surgeon: Michelle Pineda Jr., MD;  Location:  [facility] PAIN MGT;  Service: Pain Management;  Laterality: Right;    RADIOFREQUENCY THERMOCOAGULATION Left 5/14/2019    Procedure: RADIOFREQUENCY THERMAL COAGULATION LEFT DORSAL RAMUS 5 AND LATERAL BRANCH OF S1,S2 AND S3;  Surgeon: Michelle Pineda Jr., MD;  Location: [facility];  Service: Pain Management;  Laterality: Left;    RADIOFREQUENCY THERMOCOAGULATION Right 10/22/2019    Procedure: RADIOFREQUENCY THERMAL COAGULATION---DARSAL RAMUS 5 and LATERAL S1,S2,and S3 Right;  Surgeon: Michelle Pineda Jr., MD;  Location: [facility];  Service: Pain Management;  Laterality: Right;  patient to sign consent DOS    RADIOFREQUENCY THERMOCOAGULATION Left 10/29/2019    Procedure: RADIOFREQUENCY THERMAL COAGULATION - LEFT - DR5, S1,S2, AND S3;  Surgeon: Michelle Pineda Jr., MD;  Location: [facility];  Service: Pain Management;  Laterality: Left;    RADIOFREQUENCY THERMOCOAGULATION Left 5/26/2020    Procedure: RADIOFREQUENCY THERMAL COAGULATION--Left DR5+ lateral branches of S1, S2, S3;  Surgeon: Michelle Pineda Jr., MD;  Location: [facility];  Service: Pain Management;  Laterality: Left;    RADIOFREQUENCY THERMOCOAGULATION Right 6/2/2020    Procedure: RADIOFREQUENCY THERMAL COAGULATION--Right DR5+ lateral branches of S1, S2, S3;  Surgeon: Michelle Pineda Jr., MD;  Location: [facility];  Service: Pain Management;  Laterality: Right;    SENTINEL LYMPH NODE BIOPSY Right 5/9/2022    Procedure: BIOPSY, LYMPH NODE, SENTINEL-Right;  Surgeon: NEAL Dhillon MD;  Location: [facility];  Service: General;  Laterality: Right;    Surgery on right knee  07/09/1982    tumor removed from back left side upper shoulder  03/17/2006       Medications:  Current Outpatient Medications on File Prior to Visit   Medication Sig Dispense Refill    albuterol (PROVENTIL/VENTOLIN HFA) 90 mcg/actuation inhaler USE 2 INHALATIONS BY MOUTH  4 TIMES DAILY AS NEEDED 34 g 3    amLODIPine (NORVASC) 2.5 MG tablet Take 1 tablet (2.5 mg total) by  mouth 2 (two) times daily. 180 tablet 1    ascorbic acid, vitamin C, (VITAMIN C) 500 MG tablet Take 500 mg by mouth once daily.      aspirin (ECOTRIN) 81 MG EC tablet Take 81 mg by mouth once daily.      atorvastatin (LIPITOR) 80 MG tablet Take 1 tablet (80 mg total) by mouth once daily. 90 tablet 3    benztropine (COGENTIN) 0.5 MG tablet Take 1 tablet (0.5 mg total) by mouth 2 (two) times daily. 180 tablet 3    budesonide-formoterol 80-4.5 mcg (SYMBICORT) 80-4.5 mcg/actuation HFAA Inhale 2 puffs into the lungs 2 (two) times a day. 6.9 g 11    ciclopirox (PENLAC) 8 % Soln Apply topically nightly. Paint on affected nail(s) once per night, remove residue once per week with alcohol 6.6 mL 3    cloNIDine (CATAPRES) 0.1 MG tablet 1 tab po q day for systolic BP > 160 or DBP >100. 30 tablet 1    cyanocobalamin 500 MCG tablet Take 500 mcg by mouth once daily.      diclofenac (VOLTAREN) 25 MG TbEC Take 1 tablet (25 mg total) by mouth 2 (two) times daily. 30 tablet 0    diclofenac sodium (VOLTAREN) 1 % Gel Apply 2 g topically once daily. 200 g 0    exemestane (AROMASIN) 25 mg tablet TAKE 1 TABLET(25 MG) BY MOUTH EVERY DAY 90 tablet 3    fluticasone (VERAMYST) 27.5 mcg/actuation nasal spray 2 sprays by Nasal route as needed for Rhinitis.      fluticasone propionate (FLONASE) 50 mcg/actuation nasal spray 2 sprays (100 mcg total) by Each Nostril route once daily. 16 g 2    folic acid (FOLVITE) 1 MG tablet Take 1 tablet (1,000 mcg total) by mouth once daily. 90 tablet 3    gabapentin (NEURONTIN) 100 MG capsule Take 1 capsule (100 mg total) by mouth 2 (two) times daily as needed (anxiety). 180 capsule 3    gabapentin (NEURONTIN) 300 MG capsule Take 1 capsule (300 mg total) by mouth every evening. 90 capsule 3    hydrOXYzine HCL (ATARAX) 25 MG tablet Take 1 tablet (25 mg total) by mouth 2 (two) times daily as needed for Anxiety. 60 tablet 2    isosorbide mononitrate (IMDUR) 30 MG 24 hr tablet TAKE 1 TABLET(30 MG) BY MOUTH EVERY DAY  30 tablet 11    methotrexate 2.5 MG Tab Take 8 tablets (20 mg total) by mouth every 7 days. Take 4 tabs Mon am and 4 tabs Mon pm only 96 tablet 0    multivitamin (THERAGRAN) per tablet Take 1 tablet by mouth once daily.      omega-3 fatty acids/fish oil (FISH OIL-OMEGA-3 FATTY ACIDS) 300-1,000 mg capsule Take by mouth once daily.      omeprazole (PRILOSEC) 20 MG capsule Take 1 capsule (20 mg total) by mouth once daily. 30 capsule 11    omeprazole (PRILOSEC) 40 MG capsule Take 1 capsule (40 mg total) by mouth once daily. 30 capsule 0    risperiDONE (RISPERDAL) 2 MG tablet TAKE 1 TABLET(2 MG) BY MOUTH EVERY MORNING 90 tablet 3    risperiDONE (RISPERDAL) 4 MG tablet Take 1 tablet (4 mg total) by mouth every evening. 90 tablet 3    traMADoL (ULTRAM) 50 mg tablet Take 1 tablet (50 mg total) by mouth every 6 (six) hours as needed for Pain. 30 each 0    venlafaxine (EFFEXOR-XR) 150 MG Cp24 Take 1 capsule (150 mg total) by mouth once daily. 90 capsule 3    VITAMIN A ORAL Take by mouth.      vitamin D (VITAMIN D3) 1000 units Tab Take 1,000 Units by mouth once daily.      vitamin E 200 UNIT capsule Take 200 Units by mouth once daily.      zinc gluconate 50 mg tablet Take 50 mg by mouth once daily.       Current Facility-Administered Medications on File Prior to Visit   Medication Dose Route Frequency Provider Last Rate Last Admin    tropicamide 1% ophthalmic solution 1 drop  1 drop Right Eye On Call Procedure Karen Song MD   1 drop at [phone]       Social History:   Social History     Socioeconomic History    Marital status:     Number of children: 1   Occupational History    Occupation: retired asst  [employer].     Employer: 2010   Tobacco Use    Smoking status: Every Day     Current packs/day: 0.00     Average packs/day: 0.3 packs/day for 10.0 years (2.5 ttl pk-yrs)     Types: Cigarettes     Start date: 1/10/2013     Last attempt to quit: 1/10/2023     Years since quittin.6      Passive exposure: Never    Smokeless tobacco: Former    Tobacco comments:     about 5 cig a day   Substance and Sexual Activity    Alcohol use: No    Drug use: No    Sexual activity: Not Currently     Partners: Male     Birth control/protection: Post-menopausal   Social History Narrative    Exercise:  Childcare.        Ett:  3 days a week        Daughter and her 3 kids live with her.         Social Determinants of Health     Financial Resource Strain: Low Risk  (4/4/2022)    Overall Financial Resource Strain (CARDIA)     Difficulty of Paying Living Expenses: Not hard at all   Food Insecurity: No Food Insecurity (4/4/2022)    Hunger Vital Sign     Worried About Running Out of Food in the Last Year: Never true     Ran Out of Food in the Last Year: Never true   Transportation Needs: No Transportation Needs (4/4/2022)    PRAPARE - Transportation     Lack of Transportation (Medical): No     Lack of Transportation (Non-Medical): No   Physical Activity: Sufficiently Active (4/4/2022)    Exercise Vital Sign     Days of Exercise per Week: 5 days     Minutes of Exercise per Session: 40 min   Stress: No Stress Concern Present (4/4/2022)    [place] [employer] - Occupational Stress Questionnaire     Feeling of Stress : Not at all   Social Connections: Unknown (4/4/2022)    Social Connection and Isolation Panel [NHANES]     Frequency of Communication with Friends and Family: More than three times a week   Housing Stability: Low Risk  (4/4/2022)    Housing Stability Vital Sign     Unable to Pay for Housing in the Last Year: No     Number of Places Lived in the Last Year: 1     Unstable Housing in the Last Year: No       REVIEW OF SYSTEMS:  Constitution: Negative. Negative for chills, fever and night sweats.   Cardiovascular: Negative for chest pain and syncope.   Respiratory: Negative for cough and shortness of breath.   Gastrointestinal: See HPI. Negative for nausea/vomiting. Negative for abdominal  "pain.  Genitourinary: See HPI. Negative for discoloration or dysuria.  Skin: Negative for dry skin, itching and rash.   Hematologic/Lymphatic: Negative for bleeding problem. Does not bruise/bleed easily.   Musculoskeletal: Negative for falls and muscle weakness.   Neurological: See HPI. No seizures.   Endocrine: Negative for polydipsia, polyphagia and polyuria.   Allergic/Immunologic: Negative for hives and persistent infections.  Psychiatric/Behavioral: Negative for depression and insomnia.         EXAM:  Ht 5' 2.01" (1.575 m)   Wt 44.6 kg (98 lb 3.4 oz)   BMI 17.96 kg/m²     General: The patient is a 68 y.o. female in no apparent distress, the patient is oriented to person, place and time.  Psych: Normal mood and affect  HEENT: Vision grossly intact, hearing intact to the spoken word.  Lungs: Respirations unlabored.  Gait: Normal station and gait, no difficulty with toe or heel walk.   Skin: Cervical skin negative for rashes, lesions, hairy patches and surgical scars.  Range of motion: Cervical range of motion is acceptable. There is mild tenderness to palpation.  Spinal Balance: Global saggital and coronal spinal balance acceptable, no significant for scoliosis and kyphosis.  Musculoskeletal: No pain with the range of motion of the bilateral shoulders and elbows. Normal bulk and contour of the bilateral hands.  Vascular: Bilateral hands warm and well perfused, radial pulses 2+ bilaterally.  Neurological: Normal strength and tone in all major motor groups in the bilateral upper and lower extremities. Normal sensation to light touch in the C5-T1 and L2-S1 dermatomes bilaterally.  Deep tendon reflexes symmetric 2+ in the bilateral upper and lower extremities.  Negative Inverted Radial Reflex and Hilton's bilaterally. Negative Babinski bilaterally.     IMAGING:   Today I personally reviewed AP, Lat and Flex/Ex  upright C-spine films that demonstrate dextroconvex curvature.  DJD between C3 and C7. There is " instability of C2 on C3; C3 on C5 and C5 on C6.      Body mass index is 17.96 kg/m².    Hemoglobin A1C   Date Value Ref Range Status   12/11/2018 5.3 4.0 - 5.6 % Final     Comment:     ADA Screening Guidelines:  5.7-6.4%  Consistent with prediabetes  >or=6.5%  Consistent with diabetes  High levels of fetal hemoglobin interfere with the HbA1C  assay. Heterozygous hemoglobin variants (HbS, HgC, etc)do  not significantly interfere with this assay.   However, presence of multiple variants may affect accuracy.     05/26/2014 <4.0 (L) 4.5 - 6.2 % Final   10/26/2009 3.9 (L) 4.0 - 6.2 % Final           ASSESSMENT/PLAN:    Bhavna was seen today for neck pain.    Diagnoses and all orders for this visit:    Cervical radiculopathy  -     MRI Cervical Spine Without Contrast; Future    Cervical spondylosis  -     Ambulatory referral/consult to [facility]  -     MRI Cervical Spine Without Contrast; Future      Today we discussed at length all of the different treatment options including anti-inflammatories, acetaminophen, rest, ice, heat, physical therapy including strengthening and stretching exercises, home exercises, ROM, aerobic conditioning, aqua therapy, other modalities including ultrasound, massage, and dry needling, epidural steroid injections and finally surgical intervention.  MRI ordered, I will oscar with results.

## 2023-08-24 ENCOUNTER — OFFICE VISIT (OUTPATIENT)
Dept: PODIATRY | Facility: CLINIC | Age: 68
End: 2023-08-24
Payer: MEDICARE

## 2023-08-24 VITALS
RESPIRATION RATE: 18 BRPM | TEMPERATURE: 99 F | WEIGHT: 98.56 LBS | HEART RATE: 78 BPM | OXYGEN SATURATION: 96 % | BODY MASS INDEX: 18.14 KG/M2 | DIASTOLIC BLOOD PRESSURE: 74 MMHG | SYSTOLIC BLOOD PRESSURE: 156 MMHG | HEIGHT: 62 IN

## 2023-08-24 DIAGNOSIS — M25.472 LEFT ANKLE SWELLING: ICD-10-CM

## 2023-08-24 DIAGNOSIS — M79.674 PAIN OF TOES OF BOTH FEET: ICD-10-CM

## 2023-08-24 DIAGNOSIS — B35.1 TINEA UNGUIUM: Primary | ICD-10-CM

## 2023-08-24 DIAGNOSIS — M79.675 PAIN OF TOES OF BOTH FEET: ICD-10-CM

## 2023-08-24 DIAGNOSIS — M19.079 ARTHRITIS OF MIDFOOT: ICD-10-CM

## 2023-08-24 PROCEDURE — 99999 PR PBB SHADOW E&M-EST. PATIENT-LVL V: ICD-10-PCS | Mod: PBBFAC,,, | Performed by: STUDENT IN AN ORGANIZED HEALTH CARE EDUCATION/TRAINING PROGRAM

## 2023-08-24 PROCEDURE — 99204 OFFICE O/P NEW MOD 45 MIN: CPT | Mod: 25,S$GLB,, | Performed by: STUDENT IN AN ORGANIZED HEALTH CARE EDUCATION/TRAINING PROGRAM

## 2023-08-24 PROCEDURE — 1125F PR PAIN SEVERITY QUANTIFIED, PAIN PRESENT: ICD-10-PCS | Mod: CPTII,S$GLB,, | Performed by: STUDENT IN AN ORGANIZED HEALTH CARE EDUCATION/TRAINING PROGRAM

## 2023-08-24 PROCEDURE — 3008F PR BODY MASS INDEX (BMI) DOCUMENTED: ICD-10-PCS | Mod: CPTII,S$GLB,, | Performed by: STUDENT IN AN ORGANIZED HEALTH CARE EDUCATION/TRAINING PROGRAM

## 2023-08-24 PROCEDURE — 3288F PR FALLS RISK ASSESSMENT DOCUMENTED: ICD-10-PCS | Mod: CPTII,S$GLB,, | Performed by: STUDENT IN AN ORGANIZED HEALTH CARE EDUCATION/TRAINING PROGRAM

## 2023-08-24 PROCEDURE — 4010F ACE/ARB THERAPY RXD/TAKEN: CPT | Mod: CPTII,S$GLB,, | Performed by: STUDENT IN AN ORGANIZED HEALTH CARE EDUCATION/TRAINING PROGRAM

## 2023-08-24 PROCEDURE — 3008F BODY MASS INDEX DOCD: CPT | Mod: CPTII,S$GLB,, | Performed by: STUDENT IN AN ORGANIZED HEALTH CARE EDUCATION/TRAINING PROGRAM

## 2023-08-24 PROCEDURE — 11720 DEBRIDE NAIL 1-5: CPT | Mod: S$GLB,,, | Performed by: STUDENT IN AN ORGANIZED HEALTH CARE EDUCATION/TRAINING PROGRAM

## 2023-08-24 PROCEDURE — 1159F PR MEDICATION LIST DOCUMENTED IN MEDICAL RECORD: ICD-10-PCS | Mod: CPTII,S$GLB,, | Performed by: STUDENT IN AN ORGANIZED HEALTH CARE EDUCATION/TRAINING PROGRAM

## 2023-08-24 PROCEDURE — 3078F PR MOST RECENT DIASTOLIC BLOOD PRESSURE < 80 MM HG: ICD-10-PCS | Mod: CPTII,S$GLB,, | Performed by: STUDENT IN AN ORGANIZED HEALTH CARE EDUCATION/TRAINING PROGRAM

## 2023-08-24 PROCEDURE — 1101F PR PT FALLS ASSESS DOC 0-1 FALLS W/OUT INJ PAST YR: ICD-10-PCS | Mod: CPTII,S$GLB,, | Performed by: STUDENT IN AN ORGANIZED HEALTH CARE EDUCATION/TRAINING PROGRAM

## 2023-08-24 PROCEDURE — 3077F PR MOST RECENT SYSTOLIC BLOOD PRESSURE >= 140 MM HG: ICD-10-PCS | Mod: CPTII,S$GLB,, | Performed by: STUDENT IN AN ORGANIZED HEALTH CARE EDUCATION/TRAINING PROGRAM

## 2023-08-24 PROCEDURE — 4010F PR ACE/ARB THEARPY RXD/TAKEN: ICD-10-PCS | Mod: CPTII,S$GLB,, | Performed by: STUDENT IN AN ORGANIZED HEALTH CARE EDUCATION/TRAINING PROGRAM

## 2023-08-24 PROCEDURE — 3078F DIAST BP <80 MM HG: CPT | Mod: CPTII,S$GLB,, | Performed by: STUDENT IN AN ORGANIZED HEALTH CARE EDUCATION/TRAINING PROGRAM

## 2023-08-24 PROCEDURE — 99999 PR PBB SHADOW E&M-EST. PATIENT-LVL V: CPT | Mod: PBBFAC,,, | Performed by: STUDENT IN AN ORGANIZED HEALTH CARE EDUCATION/TRAINING PROGRAM

## 2023-08-24 PROCEDURE — 3288F FALL RISK ASSESSMENT DOCD: CPT | Mod: CPTII,S$GLB,, | Performed by: STUDENT IN AN ORGANIZED HEALTH CARE EDUCATION/TRAINING PROGRAM

## 2023-08-24 PROCEDURE — 3077F SYST BP >= 140 MM HG: CPT | Mod: CPTII,S$GLB,, | Performed by: STUDENT IN AN ORGANIZED HEALTH CARE EDUCATION/TRAINING PROGRAM

## 2023-08-24 PROCEDURE — 1159F MED LIST DOCD IN RCRD: CPT | Mod: CPTII,S$GLB,, | Performed by: STUDENT IN AN ORGANIZED HEALTH CARE EDUCATION/TRAINING PROGRAM

## 2023-08-24 PROCEDURE — 1101F PT FALLS ASSESS-DOCD LE1/YR: CPT | Mod: CPTII,S$GLB,, | Performed by: STUDENT IN AN ORGANIZED HEALTH CARE EDUCATION/TRAINING PROGRAM

## 2023-08-24 PROCEDURE — 99204 PR OFFICE/OUTPT VISIT, NEW, LEVL IV, 45-59 MIN: ICD-10-PCS | Mod: 25,S$GLB,, | Performed by: STUDENT IN AN ORGANIZED HEALTH CARE EDUCATION/TRAINING PROGRAM

## 2023-08-24 PROCEDURE — 1125F AMNT PAIN NOTED PAIN PRSNT: CPT | Mod: CPTII,S$GLB,, | Performed by: STUDENT IN AN ORGANIZED HEALTH CARE EDUCATION/TRAINING PROGRAM

## 2023-08-24 PROCEDURE — 11720 PR DEBRIDEMENT OF NAIL(S), 1-5: ICD-10-PCS | Mod: S$GLB,,, | Performed by: STUDENT IN AN ORGANIZED HEALTH CARE EDUCATION/TRAINING PROGRAM

## 2023-08-24 RX ORDER — CICLOPIROX 80 MG/ML
SOLUTION TOPICAL NIGHTLY
Qty: 6.6 ML | Refills: 3 | Status: SHIPPED | OUTPATIENT
Start: 2023-08-24 | End: 2024-08-23

## 2023-08-24 NOTE — PROGRESS NOTES
Subjective:     Patient    Bhavna Landry is a 68 y.o. female.    Problem    Presents for thick discolored toenails which grow into her skin and cause pain. There is pain associated with the nails, worse at right 1st nail. The right 2nd toe recently split/crumbled. She has psoriasis and is unsure if her nail changes are related to psoriasis or fungus. Also reports left anterior ankle swelling.     History    History obtained from patient and review of medical records.     Past Medical History:   Diagnosis Date    Allergy     Amblyopia     Anemia     Anticoagulant long-term use     Arthritis 1992    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, estrogen receptor positive 2022    Cataract     Depression     Dry eyes     Dry mouth     Duane's syndrome of right eye     Early dry stage nonexudative age-related macular degeneration of both eyes 2022    Fibromyalgia 2014    Fibromyalgia     Fractured hip     RIGHT HIP    GERD (gastroesophageal reflux disease)     History of psychiatric hospitalization     Hyperlipidemia 1992    Hypertension     Hypocalcemia     Hyponatremia     Kidney stone     Migraine headache     Osteoporosis     Pressure ulcer of unspecified site, unspecified stage     Psoriatic arthritis 1992    Right knee pain     post knee replacement surgery (possible rejectiion of metal)    RLS (restless legs syndrome)     Schizophrenia 1992    stable on meds    Sciatica     Squamous cell carcinoma of skin     Urinary tract infection        Past Surgical History:   Procedure Laterality Date    brow ptosis repair Right 2019    Surgeon: Dr. Karla Henson    CATARACT EXTRACTION       SECTION      COLONOSCOPY N/A 2016    Procedure: COLONOSCOPY;  Surgeon: ANDRIA Connelly MD;  Location: [address];  Service: Endoscopy;  Laterality: N/A;    COLONOSCOPY N/A 2022    Procedure: COLONOSCOPY;  Surgeon: Mikey Walters MD;  Location:  [facility] ENDO;  Service: Endoscopy;  Laterality: N/A;    cyst removed from right sinus  07/09/1982    EPIDURAL STEROID INJECTION INTO LUMBAR SPINE N/A 8/2/2023    Procedure: Injection-steroid-epidural-lumbar L5-S1;  Surgeon: Chirag Kim MD;  [facility];  Service: Pain Management;  Laterality: N/A;  asa    ESOPHAGOGASTRODUODENOSCOPY N/A 2/7/2023    Procedure: EGD (ESOPHAGOGASTRODUODENOSCOPY);  Surgeon: Dougie Shea MD;  Location: [facility] ENDO;  Service: Endoscopy;  Laterality: N/A;    HYSTERECTOMY      VAGINAL HYSTERECTOMY WITHOUT BSO - ENDOMETRIOSIS    INJECTION FOR SENTINEL NODE IDENTIFICATION Right 5/9/2022    Procedure: INJECTION, FOR SENTINEL NODE IDENTIFICATION-Right;  Surgeon: NEAL Dhillon MD;  Location: [facility];  Service: General;  Laterality: Right;    INJECTION OF ANESTHETIC AGENT AROUND MEDIAL BRANCH NERVES INNERVATING LUMBAR FACET JOINT N/A 4/11/2019    Procedure: Lumbo-sacral Block, DR5 and Lateral Branches of S1,S2, S3;  Surgeon: Michelle Pineda Jr., MD;  Location: [facility] PAIN T;  Service: Pain Management;  Laterality: N/A;  Pt takes  and states she holds ASA on her own whenever she has procedures.  Instructed to hold x 3 days prior to procedure.      INJECTION OF ANESTHETIC AGENT AROUND MEDIAL BRANCH NERVES INNERVATING LUMBAR FACET JOINT Bilateral 5/3/2023    Procedure: Block-nerve-medial branch-lumbar bilateral L3, L4, L5;  Surgeon: Maile Storm DO;  [place] [facility] [place] [facility]  Service: Pain Management;  Laterality: Bilateral;  asa    INJECTION OF ANESTHETIC AGENT INTO SACROILIAC JOINT Right 8/30/2018    Procedure: BLOCK, SACROILIAC JOINT-Right- ORAL SEDATION;  Surgeon: Michelle Pineda Jr., MD;  [place] [facility] [place] [facility] [place]  Service: Pain Management;  Laterality: Right;    INJECTION OF ANESTHETIC AGENT INTO SACROILIAC JOINT Bilateral 9/27/2018    Procedure: BLOCK, SACROILIAC JOINT-BILATERAL;  Surgeon: Michelle Pineda Jr., MD;  [place] [facility] [place] [facility] [place]  Service:  Pain Management;  Laterality: Bilateral;  Please keep at 10:00 due to trasnsportation    INJECTION OF ANESTHETIC AGENT INTO SACROILIAC JOINT Bilateral 2/7/2019    Procedure: Bilateral Sacroiliac Joint Injection - Per Dr Pineda, not necessary to hold ASA.;  Surgeon: Michelle Pineda Jr., MD;  Location: [facility] PAIN MGT;  Service: Pain Management;  Laterality: Bilateral;    INTRAOCULAR PROSTHESES INSERTION Right 8/1/2019    Procedure: INSERTION, IOL PROSTHESIS;  Surgeon: Karen Song MD;  [facility];  Service: Ophthalmology;  Laterality: Right;    INTRAOCULAR PROSTHESES INSERTION Left 9/26/2019    Procedure: INSERTION, IOL PROSTHESIS;  Surgeon: Karen Song MD;  Location: [facility] OR [address] [place] [address];  Service: Ophthalmology;  Laterality: Left;    JOINT REPLACEMENT Right     knee    KNEE SURGERY      MASTECTOMY Right 5/9/2022    Procedure: MASTECTOMY-Right;  Surgeon: NEAL Dhillon MD;  Location: [facility];  Service: General;  Laterality: Right;  2.5 HOURS    PHACOEMULSIFICATION OF CATARACT Right 8/1/2019    Procedure: PHACOEMULSIFICATION, CATARACT;  Surgeon: Karen Song MD;  Location: [facility] OR [address] [place] [address];  Service: Ophthalmology;  Laterality: Right;    PHACOEMULSIFICATION OF CATARACT Left 9/26/2019    Procedure: PHACOEMULSIFICATION, CATARACT;  Surgeon: Karen Song MD;  Location: [facility] OR [address] [place] [address];  Service: Ophthalmology;  Laterality: Left;    RADIOFREQUENCY THERMOCOAGULATION Right 5/7/2019    Procedure: RADIOFREQUENCY THERMAL COAGULATION RIGHT DORSAL RAMUS 5 AND LATERAL BRANCH OF S1, S2 AND S3;  Surgeon: Michelle Pineda Jr., MD;  Location: [facility] PAIN MGT;  Service: Pain Management;  Laterality: Right;    RADIOFREQUENCY THERMOCOAGULATION Left 5/14/2019    Procedure: RADIOFREQUENCY THERMAL COAGULATION LEFT DORSAL RAMUS 5 AND LATERAL BRANCH OF S1,S2 AND S3;  Surgeon: Michelle Pineda Jr., MD;  Location: [facility] PAIN MGT;  Service: Pain Management;  Laterality: Left;    RADIOFREQUENCY THERMOCOAGULATION  Right 10/22/2019    Procedure: RADIOFREQUENCY THERMAL COAGULATION---DARSAL RAMUS 5 and LATERAL S1,S2,and S3 Right;  Surgeon: Michelle Pineda Jr., MD;  Location: [facility];  Service: Pain Management;  Laterality: Right;  patient to sign consent DOS    RADIOFREQUENCY THERMOCOAGULATION Left 10/29/2019    Procedure: RADIOFREQUENCY THERMAL COAGULATION - LEFT - DR5, S1,S2, AND S3;  Surgeon: Michelle Pineda Jr., MD;  Location: [facility];  Service: Pain Management;  Laterality: Left;    RADIOFREQUENCY THERMOCOAGULATION Left 5/26/2020    Procedure: RADIOFREQUENCY THERMAL COAGULATION--Left DR5+ lateral branches of S1, S2, S3;  Surgeon: Michelle Pineda Jr., MD;  Location: [facility];  Service: Pain Management;  Laterality: Left;    RADIOFREQUENCY THERMOCOAGULATION Right 6/2/2020    Procedure: RADIOFREQUENCY THERMAL COAGULATION--Right DR5+ lateral branches of S1, S2, S3;  Surgeon: Michelle Pineda Jr., MD;  Location: [facility];  Service: Pain Management;  Laterality: Right;    SENTINEL LYMPH NODE BIOPSY Right 5/9/2022    Procedure: BIOPSY, LYMPH NODE, SENTINEL-Right;  Surgeon: NEAL Dhillon MD;  Location: [facility];  Service: General;  Laterality: Right;    Surgery on right knee  07/09/1982    tumor removed from back left side upper shoulder  03/17/2006        Objective:     Vitals  Wt Readings from Last 1 Encounters:   08/24/23 44.7 kg (98 lb 8.7 oz)     Temp Readings from Last 1 Encounters:   08/24/23 98.5 °F (36.9 °C)     BP Readings from Last 1 Encounters:   08/24/23 (!) 156/74     Pulse Readings from Last 1 Encounters:   08/24/23 78       Dermatological Exam    Skin:  Pedal hair growth, skin color, and skin texture normal on right  Pedal hair growth, skin color, and skin texture normal on left    Nails:  Bilateral 1st and right 2nd nail(s) thickened and bilateral 1st  nail(s) discolored; pain on palpation of nails, worse at medial and lateral borders of right 1st nail            Vascular  Exam    Arteries:  Posterior tibial artery palpable on right  Dorsalis pedis artery palpable on right  Posterior tibial artery palpable on left  Dorsalis pedis artery palpable on left    Veins:  Telangectasia on right  Telangectasia on left    Swelling:  None on right  1+ pitting on left at ankle    Neurological Exam    Wadley touch test:  6/6 sites sensed, light touch intact     Musculoskeletal Exam    Footwear:  Casual on right  Casual on left    Gait Exam:   Ambulatory Status: Ambulatory  Gait: Apropulsive  Assistive Devices: None    Foot Progression Angle:  Normal on right  Normal on left     Right Lower Extremity Additional Findings:  Right foot and ankle function, strength, and range of motion unremarkable except as noted above.     Left Lower Extremity Additional Findings:  Left foot and ankle function, strength, and range of motion unremarkable except as noted above.    Imaging and Other Tests    Imaging:  Independently reviewed and interpreted imaging, findings are as follows:   05/24/23 L ankle X ray: ankle unremarkable, there is dorsal foot arthritis from talus to cuneiforms.      Assessment:     Encounter Diagnoses   Name Primary?    Tinea unguium Yes    Pain of toes of both feet     Left ankle swelling     Arthritis of midfoot         Plan:     I counseled the patient on her conditions, their implications and medical management.    Tinea unguium, pain: chronic stable  -Recommended wearing comfortable well fitting shoes; discarding old footwear or disinfecting old footwear with naphthalene mothballs; avoiding trauma to the nails; avoiding sharing nail clippers with family or friends; avoiding nail salons that do not employ sterile techniques; and wearing flip flops or shower shoes at public pools, showers, locker rooms, and hotels.  -Discussed treatment options including (1) monitoring, (2) debridement, (3) topical antifungals, (4) oral antifungals. Discussed potential risks, benefits, alternatives to  each. Patient opted for topical antifungals and debridement.  -Nails debrided x3, thickness and length reduced using sterile nail nippers, see procedure note. Other nails trimmed as a courtesy.   -Right 1st nail slightly ingrown, return for ingrown nail procedure if needed.   -Ordered topical ciclopirox to be applied to affected nails nightly for 6-12 months, clean of weekly with alcohol.    Left ankle-midfoot swelling and arthritis: chronic stable  -Left anterior ankle swelling may be related to proximal midfoot arthritis and may be amplified by minor venous insufficiency.  -Elevate LLE at all times when in bed or chair.  -OTC compression stocking to LLE when active.  -Voltaren to left midfoot ankle PRN.       Return to clinic in 3 months for routine foot care, call sooner PRN.

## 2023-08-24 NOTE — PROCEDURES
"Routine Foot Care    Date/Time: 8/24/2023 1:00 PM    Performed by: Walt Zhang DPM  Authorized by: Walt Zhang DPM    Time out: Immediately prior to procedure a "time out" was called to verify the correct patient, procedure, equipment, support staff and site/side marked as required.    Consent Done?:  Yes (Verbal)    Nail Care Type:  Debride(Left 1st Toe, Right 1st Toe and Right 2nd Toe)  Patient tolerance:  Patient tolerated the procedure well with no immediate complications     Other nails trimmed as a courtesy.      "

## 2023-08-25 ENCOUNTER — TELEPHONE (OUTPATIENT)
Dept: INTERNAL MEDICINE | Facility: CLINIC | Age: 68
End: 2023-08-25
Payer: MEDICARE

## 2023-08-25 ENCOUNTER — OFFICE VISIT (OUTPATIENT)
Dept: URGENT CARE | Facility: CLINIC | Age: 68
End: 2023-08-25
Payer: MEDICARE

## 2023-08-25 VITALS
SYSTOLIC BLOOD PRESSURE: 156 MMHG | TEMPERATURE: 98 F | DIASTOLIC BLOOD PRESSURE: 74 MMHG | OXYGEN SATURATION: 98 % | RESPIRATION RATE: 17 BRPM | HEIGHT: 62 IN | BODY MASS INDEX: 18.03 KG/M2 | WEIGHT: 98 LBS | HEART RATE: 67 BPM

## 2023-08-25 DIAGNOSIS — K21.9 GASTROESOPHAGEAL REFLUX DISEASE, UNSPECIFIED WHETHER ESOPHAGITIS PRESENT: ICD-10-CM

## 2023-08-25 DIAGNOSIS — M79.672 LEFT FOOT PAIN: Primary | ICD-10-CM

## 2023-08-25 DIAGNOSIS — S99.922A INJURY OF LEFT FOOT, INITIAL ENCOUNTER: Primary | ICD-10-CM

## 2023-08-25 PROCEDURE — 99203 PR OFFICE/OUTPT VISIT, NEW, LEVL III, 30-44 MIN: ICD-10-PCS | Mod: S$GLB,,, | Performed by: FAMILY MEDICINE

## 2023-08-25 PROCEDURE — 73630 XR FOOT COMPLETE 3 VIEW LEFT: ICD-10-PCS | Mod: FY,LT,S$GLB, | Performed by: RADIOLOGY

## 2023-08-25 PROCEDURE — 73630 X-RAY EXAM OF FOOT: CPT | Mod: FY,LT,S$GLB, | Performed by: RADIOLOGY

## 2023-08-25 PROCEDURE — 99203 OFFICE O/P NEW LOW 30 MIN: CPT | Mod: S$GLB,,, | Performed by: FAMILY MEDICINE

## 2023-08-25 RX ORDER — DICLOFENAC SODIUM 25 MG/1
25 TABLET, DELAYED RELEASE ORAL 2 TIMES DAILY
Qty: 30 TABLET | Refills: 0 | Status: SHIPPED | OUTPATIENT
Start: 2023-08-25 | End: 2024-02-23

## 2023-08-25 RX ORDER — OMEPRAZOLE 40 MG/1
40 CAPSULE, DELAYED RELEASE ORAL DAILY
Qty: 30 CAPSULE | Refills: 0 | Status: SHIPPED | OUTPATIENT
Start: 2023-08-25 | End: 2023-09-19 | Stop reason: SDUPTHER

## 2023-08-25 NOTE — TELEPHONE ENCOUNTER
----- Message from Jessica Genao MA sent at 8/25/2023  8:30 AM CDT -----  Regarding: FW: urgent X ray order request  Contact: pt    ----- Message -----  From: Paresh Kovacs  Sent: 8/25/2023   8:29 AM CDT  To: Ashli PANCHAL Staff  Subject: urgent X ray order request                       Pt requesting urgent call back RE: Pt states she stomped her toe and one side is black and blue other side is red as if it is bleeding under her skin, she is requesting an xray today at the [facility] please call      Confirmed contact below:  Contact Name:Bhavna Landry  Phone Number: [phone]

## 2023-08-25 NOTE — TELEPHONE ENCOUNTER
Returned pt call, pt asking if Dr Fonseca placed an Xray order. Explained that Dr DIAZ is in clinic and she has not yet ordered the xray. As soon as we receive orders, we will let her know. Pt verbalized understanding.

## 2023-08-25 NOTE — TELEPHONE ENCOUNTER
Left foot injury. She stubbed her toe next to the small to yesterday on a desk leg. That toe is black and blue. Hurts to walk and touch it.

## 2023-08-25 NOTE — TELEPHONE ENCOUNTER
----- Message from Peggy Padgett sent at 8/25/2023 12:52 PM CDT -----  Contact: [phone]  Patient called, requested a courtesy call from the nurse, in regards a possible xray order place by Dr Vargas. Please call and advise. Thank you

## 2023-08-25 NOTE — PROGRESS NOTES
"Subjective:      Patient ID: Bhavna Landry is a 68 y.o. female.    Vitals:  height is 5' 2.01" (1.575 m) and weight is 44.5 kg (98 lb). Her oral temperature is 98.2 °F (36.8 °C). Her blood pressure is 156/74 (abnormal) and her pulse is 67. Her respiration is 17 and oxygen saturation is 98%.     Chief Complaint: Foot Injury    69 y/o female presents today c/o Lt foot pain that started after she hit on a table leg.   Patient reports severe pain on her Lt big toe.   Patient has been taking Tramadol with no improvement.     Foot Injury   The incident occurred 12 to 24 hours ago. The incident occurred at home. The pain is at a severity of 10/10. The pain is severe. Pertinent negatives include no inability to bear weight, loss of motion, loss of sensation, muscle weakness, numbness or tingling. She reports no foreign bodies present. The symptoms are aggravated by movement. Treatments tried: Tramadol.       Neurological:  Negative for numbness.      Objective:     Physical Exam   Constitutional: She is oriented to person, place, and time. No distress. normal  HENT:   Head: Normocephalic and atraumatic.   Ears:   Right Ear: External ear normal.   Left Ear: External ear normal.   Nose: No rhinorrhea or congestion.   Eyes: Conjunctivae are normal. Pupils are equal, round, and reactive to light. Extraocular movement intact   Neck: Neck supple.   Pulmonary/Chest: Effort normal. No stridor.   Musculoskeletal: Normal range of motion.         General: No swelling. Normal range of motion.      Left foot: Left 4th toe: Exhibits decreased ROM and tenderness.   Neurological: no focal deficit. She is alert and oriented to person, place, and time.   Skin: Skin is warm and dry.   Psychiatric: Her behavior is normal. Mood, judgment and thought content normal.   Nursing note and vitals reviewed.    Assessment:     Plan:   1. Injury of left foot, initial encounter  - XR FOOT COMPLETE 3 VIEW LEFT; Future  - NON-PNEUMATIC WALKING BOOT FOR " HOME USE  - diclofenac (VOLTAREN) 25 MG TbEC; Take 1 tablet (25 mg total) by mouth 2 (two) times daily.  Dispense: 30 tablet; Refill: 0    2. Gastroesophageal reflux disease, unspecified whether esophagitis present  - omeprazole (PRILOSEC) 40 MG capsule; Take 1 capsule (40 mg total) by mouth once daily.  Dispense: 30 capsule; Refill: 0     Patient was given results of her testing prior to being discharged from our clinic

## 2023-08-28 NOTE — TELEPHONE ENCOUNTER
Called and spoke to pt. Pt states she was seen in UC and had a negative xray, no further needs at this time.

## 2023-08-29 ENCOUNTER — OFFICE VISIT (OUTPATIENT)
Dept: ORTHOPEDICS | Facility: CLINIC | Age: 68
End: 2023-08-29
Payer: MEDICARE

## 2023-08-29 VITALS — HEIGHT: 62 IN | BODY MASS INDEX: 18.07 KG/M2 | WEIGHT: 98.19 LBS

## 2023-08-29 DIAGNOSIS — M47.812 CERVICAL SPONDYLOSIS: ICD-10-CM

## 2023-08-29 DIAGNOSIS — M54.12 CERVICAL RADICULOPATHY: Primary | ICD-10-CM

## 2023-08-29 PROCEDURE — 4010F PR ACE/ARB THEARPY RXD/TAKEN: ICD-10-PCS | Mod: CPTII,S$GLB,, | Performed by: REGISTERED NURSE

## 2023-08-29 PROCEDURE — 4010F ACE/ARB THERAPY RXD/TAKEN: CPT | Mod: CPTII,S$GLB,, | Performed by: REGISTERED NURSE

## 2023-08-29 PROCEDURE — 99999 PR PBB SHADOW E&M-EST. PATIENT-LVL V: ICD-10-PCS | Mod: PBBFAC,,, | Performed by: REGISTERED NURSE

## 2023-08-29 PROCEDURE — 1125F AMNT PAIN NOTED PAIN PRSNT: CPT | Mod: CPTII,S$GLB,, | Performed by: REGISTERED NURSE

## 2023-08-29 PROCEDURE — 1160F PR REVIEW ALL MEDS BY PRESCRIBER/CLIN PHARMACIST DOCUMENTED: ICD-10-PCS | Mod: CPTII,S$GLB,, | Performed by: REGISTERED NURSE

## 2023-08-29 PROCEDURE — 99999 PR PBB SHADOW E&M-EST. PATIENT-LVL V: CPT | Mod: PBBFAC,,, | Performed by: REGISTERED NURSE

## 2023-08-29 PROCEDURE — 99214 PR OFFICE/OUTPT VISIT, EST, LEVL IV, 30-39 MIN: ICD-10-PCS | Mod: S$GLB,,, | Performed by: REGISTERED NURSE

## 2023-08-29 PROCEDURE — 1159F PR MEDICATION LIST DOCUMENTED IN MEDICAL RECORD: ICD-10-PCS | Mod: CPTII,S$GLB,, | Performed by: REGISTERED NURSE

## 2023-08-29 PROCEDURE — 3288F FALL RISK ASSESSMENT DOCD: CPT | Mod: CPTII,S$GLB,, | Performed by: REGISTERED NURSE

## 2023-08-29 PROCEDURE — 1160F RVW MEDS BY RX/DR IN RCRD: CPT | Mod: CPTII,S$GLB,, | Performed by: REGISTERED NURSE

## 2023-08-29 PROCEDURE — 1101F PT FALLS ASSESS-DOCD LE1/YR: CPT | Mod: CPTII,S$GLB,, | Performed by: REGISTERED NURSE

## 2023-08-29 PROCEDURE — 3008F BODY MASS INDEX DOCD: CPT | Mod: CPTII,S$GLB,, | Performed by: REGISTERED NURSE

## 2023-08-29 PROCEDURE — 1159F MED LIST DOCD IN RCRD: CPT | Mod: CPTII,S$GLB,, | Performed by: REGISTERED NURSE

## 2023-08-29 PROCEDURE — 1101F PR PT FALLS ASSESS DOC 0-1 FALLS W/OUT INJ PAST YR: ICD-10-PCS | Mod: CPTII,S$GLB,, | Performed by: REGISTERED NURSE

## 2023-08-29 PROCEDURE — 1125F PR PAIN SEVERITY QUANTIFIED, PAIN PRESENT: ICD-10-PCS | Mod: CPTII,S$GLB,, | Performed by: REGISTERED NURSE

## 2023-08-29 PROCEDURE — 3288F PR FALLS RISK ASSESSMENT DOCUMENTED: ICD-10-PCS | Mod: CPTII,S$GLB,, | Performed by: REGISTERED NURSE

## 2023-08-29 PROCEDURE — 3008F PR BODY MASS INDEX (BMI) DOCUMENTED: ICD-10-PCS | Mod: CPTII,S$GLB,, | Performed by: REGISTERED NURSE

## 2023-08-29 PROCEDURE — 99214 OFFICE O/P EST MOD 30 MIN: CPT | Mod: S$GLB,,, | Performed by: REGISTERED NURSE

## 2023-08-30 ENCOUNTER — TELEPHONE (OUTPATIENT)
Dept: INTERNAL MEDICINE | Facility: CLINIC | Age: 68
End: 2023-08-30
Payer: MEDICARE

## 2023-08-30 RX ORDER — METHOCARBAMOL 500 MG/1
TABLET, FILM COATED ORAL
Qty: 40 TABLET | Refills: 1 | Status: SHIPPED | OUTPATIENT
Start: 2023-08-30 | End: 2023-10-16

## 2023-08-30 NOTE — TELEPHONE ENCOUNTER
Returned pt call. Pt states she lost her bottle of muscle relaxers and needs a new prescription. I do not see any muscle relaxer in pt med hx. Pt states she woke with a leg cramp at 1am and a hand cramp today. Please advise.

## 2023-08-30 NOTE — TELEPHONE ENCOUNTER
----- Message from Katherine Isaac sent at 8/30/2023 11:01 AM CDT -----  Contact: Jessica self [phone]  1MEDICALADVICE     Patient is calling for Medical Advice regarding:    How long has patient had these symptoms:    Pharmacy name and phone#:    Would like response via Pawziit: call back    Comments: Pt is calling because she would like to get her muscle relaxers refilled because she has misplaced them and can't find them. Pt doesn't know the name of the muscle relaxers

## 2023-09-05 NOTE — ED TRIAGE NOTES
Pt presents to ED with c/o migraine x3 weeks. Pt's neurologist told pt to come to ED. Pt called him and was told to come in. Pt states her narcotic pain meds helps but headache comes back when meds wear off. Pt states her right eye has been swollen x3 weeks.   
Universal Safety Interventions

## 2023-09-07 ENCOUNTER — TELEPHONE (OUTPATIENT)
Dept: INTERNAL MEDICINE | Facility: CLINIC | Age: 68
End: 2023-09-07
Payer: MEDICARE

## 2023-09-07 NOTE — TELEPHONE ENCOUNTER
----- Message from Yumiko Bernal sent at 9/7/2023 10:57 AM CDT -----  Contact: [phone]  1MEDICALADVICE     Patient is calling for Medical Advice regarding: hit her toe again same toe    How long has patient had these symptoms:    Pharmacy name and phone#:    Would like response via mychart:  call back    Comments:    Pt said she needs a call back.

## 2023-09-08 ENCOUNTER — OFFICE VISIT (OUTPATIENT)
Dept: ORTHOPEDICS | Facility: CLINIC | Age: 68
End: 2023-09-08
Payer: MEDICARE

## 2023-09-08 DIAGNOSIS — M54.12 CERVICAL RADICULOPATHY: Primary | ICD-10-CM

## 2023-09-08 PROCEDURE — 99499 UNLISTED E&M SERVICE: CPT | Mod: 95,,, | Performed by: REGISTERED NURSE

## 2023-09-08 PROCEDURE — 99499 NO LOS: ICD-10-PCS | Mod: 95,,, | Performed by: REGISTERED NURSE

## 2023-09-12 ENCOUNTER — OFFICE VISIT (OUTPATIENT)
Dept: PSYCHIATRY | Facility: CLINIC | Age: 68
End: 2023-09-12
Payer: COMMERCIAL

## 2023-09-12 ENCOUNTER — OFFICE VISIT (OUTPATIENT)
Dept: URGENT CARE | Facility: CLINIC | Age: 68
End: 2023-09-12
Payer: MEDICARE

## 2023-09-12 VITALS
HEIGHT: 62 IN | TEMPERATURE: 98 F | BODY MASS INDEX: 18.03 KG/M2 | DIASTOLIC BLOOD PRESSURE: 73 MMHG | WEIGHT: 98 LBS | RESPIRATION RATE: 18 BRPM | OXYGEN SATURATION: 95 % | SYSTOLIC BLOOD PRESSURE: 146 MMHG | HEART RATE: 68 BPM

## 2023-09-12 DIAGNOSIS — G31.84 MILD COGNITIVE IMPAIRMENT WITH MEMORY LOSS: ICD-10-CM

## 2023-09-12 DIAGNOSIS — E87.1 HYPONATREMIA: ICD-10-CM

## 2023-09-12 DIAGNOSIS — G25.81 RESTLESS LEGS: ICD-10-CM

## 2023-09-12 DIAGNOSIS — F20.0 PARANOID SCHIZOPHRENIA: Primary | ICD-10-CM

## 2023-09-12 DIAGNOSIS — F33.42 RECURRENT MAJOR DEPRESSIVE DISORDER, IN FULL REMISSION: ICD-10-CM

## 2023-09-12 DIAGNOSIS — S99.922D INJURY OF LEFT FOOT, SUBSEQUENT ENCOUNTER: ICD-10-CM

## 2023-09-12 DIAGNOSIS — F41.1 GAD (GENERALIZED ANXIETY DISORDER): ICD-10-CM

## 2023-09-12 DIAGNOSIS — S92.515A CLOSED NONDISPLACED FRACTURE OF PROXIMAL PHALANX OF LESSER TOE OF LEFT FOOT, INITIAL ENCOUNTER: ICD-10-CM

## 2023-09-12 DIAGNOSIS — S99.922A FOOT INJURY, LEFT, INITIAL ENCOUNTER: Primary | ICD-10-CM

## 2023-09-12 PROCEDURE — 4010F PR ACE/ARB THEARPY RXD/TAKEN: ICD-10-PCS | Mod: CPTII,S$GLB,, | Performed by: INTERNAL MEDICINE

## 2023-09-12 PROCEDURE — 99999 PR PBB SHADOW E&M-EST. PATIENT-LVL I: ICD-10-PCS | Mod: PBBFAC,,, | Performed by: INTERNAL MEDICINE

## 2023-09-12 PROCEDURE — 1159F PR MEDICATION LIST DOCUMENTED IN MEDICAL RECORD: ICD-10-PCS | Mod: CPTII,S$GLB,, | Performed by: INTERNAL MEDICINE

## 2023-09-12 PROCEDURE — 99999 PR PBB SHADOW E&M-EST. PATIENT-LVL I: CPT | Mod: PBBFAC,,, | Performed by: INTERNAL MEDICINE

## 2023-09-12 PROCEDURE — 4010F ACE/ARB THERAPY RXD/TAKEN: CPT | Mod: CPTII,S$GLB,, | Performed by: INTERNAL MEDICINE

## 2023-09-12 PROCEDURE — 99214 PR OFFICE/OUTPT VISIT, EST, LEVL IV, 30-39 MIN: ICD-10-PCS | Mod: S$GLB,,, | Performed by: INTERNAL MEDICINE

## 2023-09-12 PROCEDURE — 73630 XR FOOT COMPLETE 3 VIEW LEFT: ICD-10-PCS | Mod: FY,LT,S$GLB, | Performed by: RADIOLOGY

## 2023-09-12 PROCEDURE — 1159F MED LIST DOCD IN RCRD: CPT | Mod: CPTII,S$GLB,, | Performed by: INTERNAL MEDICINE

## 2023-09-12 PROCEDURE — 99213 OFFICE O/P EST LOW 20 MIN: CPT | Mod: S$GLB,,,

## 2023-09-12 PROCEDURE — 99214 OFFICE O/P EST MOD 30 MIN: CPT | Mod: S$GLB,,, | Performed by: INTERNAL MEDICINE

## 2023-09-12 PROCEDURE — 73630 X-RAY EXAM OF FOOT: CPT | Mod: FY,LT,S$GLB, | Performed by: RADIOLOGY

## 2023-09-12 PROCEDURE — 1160F PR REVIEW ALL MEDS BY PRESCRIBER/CLIN PHARMACIST DOCUMENTED: ICD-10-PCS | Mod: CPTII,S$GLB,, | Performed by: INTERNAL MEDICINE

## 2023-09-12 PROCEDURE — 99213 PR OFFICE/OUTPT VISIT, EST, LEVL III, 20-29 MIN: ICD-10-PCS | Mod: S$GLB,,,

## 2023-09-12 PROCEDURE — 1160F RVW MEDS BY RX/DR IN RCRD: CPT | Mod: CPTII,S$GLB,, | Performed by: INTERNAL MEDICINE

## 2023-09-12 NOTE — PATIENT INSTRUCTIONS
A referral for Orthopedics has been placed. Call [phone] to schedule an appointment. Make sure to follow up in 1-2 weeks or sooner if symptoms continue or worsen.     - Continue to wear walking boot at all times.  - Rest, ice and elevate the foot.     - Acetaminophen (tylenol) or Ibuprofen (advil,motrin) as directed as needed for fever/pain. Avoid tylenol if you have a history of liver disease. Do not take ibuprofen if you have a history of GI bleeding, kidney disease, or if you take blood thinners.   - Ibuprofen dosing for adults: 400 mg by mouth every 4-6 hours as needed. Max: 2400 mg/day; Info: use lowest effective dose, shortest effective treatment duration; give w/ food if GI upset occurs.  - Tylenol dosing for adults: [By mouth route, immediate-release form] Dose: 325-1000 mg by mouth every 4-6h as needed; Max: 1 g/4h and 4 g/day from all sources. [By mouth route, extended-release form] Dose: 650-1300 mg Extended Release by mouth every 8h as needed; Max: 4 g/day from all sources.     - You must understand that you have received an Urgent Care treatment only and that you may be released before all of your medical problems are known or treated.   - You, the patient, will arrange for follow up care as instructed.   - If your condition worsens or fails to improve we recommend that you receive another evaluation at the ER immediately or contact your PCP to discuss your concerns or return here.   - Follow up with your PCP or specialty clinic as directed in the next 1-2 weeks if not improved or as needed.  You can call [phone] to schedule an appointment with the appropriate provider.    If your symptoms do not improve or worsen, go to the emergency room immediately.

## 2023-09-12 NOTE — PROGRESS NOTES
"Subjective:      Patient ID: Bhavna Landry is a 68 y.o. female.    Vitals:  height is 5' 2.01" (1.575 m) and weight is 44.5 kg (98 lb). Her oral temperature is 97.5 °F (36.4 °C). Her blood pressure is 146/73 (abnormal) and her pulse is 68. Her respiration is 18 and oxygen saturation is 95%.     Chief Complaint: Foot Injury (left)    68 year old female patient presenting with left foot and toe pain due to hitting her foot on her bed 6 days ago. She states she re injured it this morning hitting the edge of her bed while she was walking.     Foot Injury   The incident occurred 5 to 7 days ago (6 days ago). The incident occurred at home. The injury mechanism was a direct blow. The pain is present in the left foot and left toes. The quality of the pain is described as stabbing and burning. The pain is at a severity of 7/10. The pain is severe. The pain has been Constant since onset. Associated symptoms include an inability to bear weight. Pertinent negatives include no numbness or tingling. She reports no foreign bodies present. The symptoms are aggravated by palpation, weight bearing and movement. She has tried acetaminophen for the symptoms. The treatment provided no relief.       Musculoskeletal:  Positive for pain, trauma, joint pain and joint swelling.   Skin:  Negative for erythema.   Neurological:  Negative for disorientation, altered mental status and numbness.   Psychiatric/Behavioral:  Negative for altered mental status and disorientation.       Objective:     Physical Exam   Constitutional: She is oriented to person, place, and time.  Non-toxic appearance. She does not appear ill. No distress.      Comments:Patient sits comfortably in exam chair. Answers questions in complete sentences. Does not show any signs of distress or discoloration.        HENT:   Head: Normocephalic and atraumatic.   Ears:   Right Ear: External ear normal.   Left Ear: External ear normal.   Nose: Nose normal.   Eyes: Pupils are equal, " round, and reactive to light. Extraocular movement intact   Pulmonary/Chest: Effort normal. No respiratory distress.   Abdominal: Normal appearance.   Musculoskeletal:         General: Swelling, tenderness and signs of injury present. No deformity.      Right lower leg: No edema.      Left lower leg: No edema.      Left foot: Normal range of motion and normal capillary refill. Tenderness, bony tenderness and swelling present. No crepitus, deformity, laceration, bunion, Charcot foot, foot drop or prominent metatarsal heads.   Neurological: no focal deficit. She is alert and oriented to person, place, and time.   Skin: Skin is warm, dry, not diaphoretic, not pale and no rash. Capillary refill takes less than 2 seconds. not left footNo bruising, No erythema and No lesion jaundice  Psychiatric: Her behavior is normal. Mood, judgment and thought content normal.     X-Ray Foot Complete 3 view Left    Result Date: 9/12/2023  EXAMINATION: XR FOOT COMPLETE 3 VIEW LEFT CLINICAL HISTORY: .  Unspecified injury of left foot, initial encounter TECHNIQUE: AP, lateral and oblique views of the left foot were performed. COMPARISON: 08/25/2023 FINDINGS: The 4th toe has recent fractures without significant displacement located at the tuft of the distal phalanx and shaft of the proximal phalanx.  No dislocation or other fracture is detected.  Some soft tissue swelling is present at the 4th toe also.  Joint spaces are satisfactory overall.     Left 4th toe fractures. Electronically signed by: Boo Verde MD Date:    09/12/2023 Time:    13:27    XR FOOT COMPLETE 3 VIEW LEFT    Result Date: 8/25/2023  EXAMINATION: XR FOOT COMPLETE 3 VIEW LEFT CLINICAL HISTORY: .  Unspecified injury of left foot, initial encounter TECHNIQUE: AP, lateral and oblique views of the left foot were performed. COMPARISON: 06/27/2019 FINDINGS: Bones appear somewhat demineralized.  Alignment is satisfactory and joint spaces appear adequately maintained.  Mild  degenerative changes at the talonavicular joint.  No fracture, dislocation, or erosive change.  No soft tissue abnormality appreciated.     No acute abnormality Electronically signed by: Capo Ruiz MD Date:    08/25/2023 Time:    16:04      Assessment:     1. Foot injury, left, initial encounter    2. Closed nondisplaced fracture of proximal phalanx of lesser toe of left foot, initial encounter        Plan:   Patient already in walking boot from previous injury.       Foot injury, left, initial encounter  -     X-Ray Foot Complete 3 view Left; Future; Expected date: 09/12/2023    Closed nondisplaced fracture of proximal phalanx of lesser toe of left foot, initial encounter  -     Ambulatory referral/consult to Orthopedics                Patient Instructions   A referral for Orthopedics has been placed. Call [phone] to schedule an appointment. Make sure to follow up in 1-2 weeks or sooner if symptoms continue or worsen.     - Continue to wear walking boot at all times.  - Rest, ice and elevate the foot.     - Acetaminophen (tylenol) or Ibuprofen (advil,motrin) as directed as needed for fever/pain. Avoid tylenol if you have a history of liver disease. Do not take ibuprofen if you have a history of GI bleeding, kidney disease, or if you take blood thinners.   - Ibuprofen dosing for adults: 400 mg by mouth every 4-6 hours as needed. Max: 2400 mg/day; Info: use lowest effective dose, shortest effective treatment duration; give w/ food if GI upset occurs.  - Tylenol dosing for adults: [By mouth route, immediate-release form] Dose: 325-1000 mg by mouth every 4-6h as needed; Max: 1 g/4h and 4 g/day from all sources. [By mouth route, extended-release form] Dose: 650-1300 mg Extended Release by mouth every 8h as needed; Max: 4 g/day from all sources.     - You must understand that you have received an Urgent Care treatment only and that you may be released before all of your medical problems are known or treated.   -  You, the patient, will arrange for follow up care as instructed.   - If your condition worsens or fails to improve we recommend that you receive another evaluation at the ER immediately or contact your PCP to discuss your concerns or return here.   - Follow up with your PCP or specialty clinic as directed in the next 1-2 weeks if not improved or as needed.  You can call [phone] to schedule an appointment with the appropriate provider.    If your symptoms do not improve or worsen, go to the emergency room immediately.

## 2023-09-12 NOTE — PROGRESS NOTES
OUTPATIENT PSYCHIATRY RETURN VISIT    ENCOUNTER DATE:  9/12/2023  SITE:  [facility]  LENGTH OF SESSION:  20 minutes    CHIEF COMPLAINT:  Follow-up      HISTORY OF PRESENTING ILLNESS:  Bhavna Landry is a 68 y.o. female with history of Paranoid Schizophrenia, Depression, and Insomnia who presents for follow up appointment.     Plan at last appointment on 4/10/2023:  Symptoms currently stable.    Continue Effexor 75mg daily, Risperdal 2mg/4mg, Neurontin 300mg qHS, Neurontin 100mg daily PRN anxiety, and Cogentin 0.5mg BID.   Discussed with patient and daughter informed consent, risks versus benefits, alternative treatments, side effect profile and the inherent unpredictability of individual responses to these treatments.  The patient's daughter expresses understanding of the above and displays the capacity to agree with this current plan.    Telephone conversation 7/19/23:  Spoke with patient.  She has had worsening anxiety over the last few weeks for no reason.  Feeling anxious all the time but this morning woke up feeling particularly anxious.  Denies panic attacks.  Discussed possibility of increasing Effexor back to 150mg daily since she was on this dose for years.  I will touch base with her medication team regarding their thoughts on her chronic hyponatremia.  Will start hydroxyzine 25mg BID PRN anxiety as well.  In person follow up appointment scheduled for September.      History as told by patient today:  Things have been ok.  Hurt her left foot toes - wearing a boot.  Bumped foot on metal bed frame.  Mood has been ok.  Relationship with son-in-law and daughter still tough - stress over finances, groceries.  They won't let her eat the food they buy, even if she is sitting right there.  Increase back to Effexor 150mg has been more helpful for anxiety.  Takes hydroxyzine as needed occasionally.  House is under patient's name - pays all house bills.  House insurance is going up.   Patient contributes to their groceries.  Denies psychosis or paranoia.  Sleeping very well.    Medication side effects:  Denies  Medication compliance:  Yes    PSYCHIATRIC REVIEW OF SYSTEMS:  Trouble with sleep:  Denies   Appetite changes:  Denies  Weight changes:  Not discussed  Lack of energy:  Sometimes  Anhedonia:  Denies  Somatic symptoms:  Denies  Libido:  Not discussed  Anxiety/panic:  Sometimes related to family household stress  Guilty/hopeless:  Denies  Self-injurious behavior/risky behavior:  Denies  Any drugs:  Denies  Alcohol:  Denies    MEDICAL REVIEW OF SYSTEMS:  Complete review of systems performed covering Constitutional, Musculoskeletal, Neurologic.  All systems negative except for that covered in HPI.    PAST PSYCHIATRIC, MEDICAL, AND SOCIAL HISTORY REVIEWED  The patient's past medical, family and social history have been reviewed and updated as appropriate within the electronic medical record - see encounter notes.    MEDICATIONS:    Current Outpatient Medications:     albuterol (PROVENTIL/VENTOLIN HFA) 90 mcg/actuation inhaler, USE 2 INHALATIONS BY MOUTH  4 TIMES DAILY AS NEEDED, Disp: 34 g, Rfl: 3    amLODIPine (NORVASC) 2.5 MG tablet, Take 1 tablet (2.5 mg total) by mouth 2 (two) times daily., Disp: 180 tablet, Rfl: 1    ascorbic acid, vitamin C, (VITAMIN C) 500 MG tablet, Take 500 mg by mouth once daily., Disp: , Rfl:     aspirin (ECOTRIN) 81 MG EC tablet, Take 81 mg by mouth once daily., Disp: , Rfl:     atorvastatin (LIPITOR) 80 MG tablet, Take 1 tablet (80 mg total) by mouth once daily., Disp: 90 tablet, Rfl: 3    benztropine (COGENTIN) 0.5 MG tablet, Take 1 tablet (0.5 mg total) by mouth 2 (two) times daily., Disp: 180 tablet, Rfl: 3    budesonide-formoterol 80-4.5 mcg (SYMBICORT) 80-4.5 mcg/actuation HFAA, Inhale 2 puffs into the lungs 2 (two) times a day., Disp: 6.9 g, Rfl: 11    ciclopirox (PENLAC) 8 % Soln, Apply topically nightly. Paint on affected nail(s) once per night, remove  residue once per week with alcohol, Disp: 6.6 mL, Rfl: 3    cloNIDine (CATAPRES) 0.1 MG tablet, 1 tab po q day for systolic BP > 160 or DBP >100., Disp: 30 tablet, Rfl: 1    cyanocobalamin 500 MCG tablet, Take 500 mcg by mouth once daily., Disp: , Rfl:     diclofenac (VOLTAREN) 25 MG TbEC, Take 1 tablet (25 mg total) by mouth 2 (two) times daily., Disp: 30 tablet, Rfl: 0    diclofenac sodium (VOLTAREN) 1 % Gel, Apply 2 g topically once daily., Disp: 200 g, Rfl: 0    exemestane (AROMASIN) 25 mg tablet, TAKE 1 TABLET(25 MG) BY MOUTH EVERY DAY, Disp: 90 tablet, Rfl: 3    fluticasone (VERAMYST) 27.5 mcg/actuation nasal spray, 2 sprays by Nasal route as needed for Rhinitis., Disp: , Rfl:     fluticasone propionate (FLONASE) 50 mcg/actuation nasal spray, 2 sprays (100 mcg total) by Each Nostril route once daily., Disp: 16 g, Rfl: 2    folic acid (FOLVITE) 1 MG tablet, Take 1 tablet (1,000 mcg total) by mouth once daily., Disp: 90 tablet, Rfl: 3    gabapentin (NEURONTIN) 100 MG capsule, Take 1 capsule (100 mg total) by mouth 2 (two) times daily as needed (anxiety)., Disp: 180 capsule, Rfl: 3    gabapentin (NEURONTIN) 300 MG capsule, Take 1 capsule (300 mg total) by mouth every evening., Disp: 90 capsule, Rfl: 3    hydrOXYzine HCL (ATARAX) 25 MG tablet, Take 1 tablet (25 mg total) by mouth 2 (two) times daily as needed for Anxiety., Disp: 60 tablet, Rfl: 2    isosorbide mononitrate (IMDUR) 30 MG 24 hr tablet, TAKE 1 TABLET(30 MG) BY MOUTH EVERY DAY, Disp: 30 tablet, Rfl: 11    methocarbamoL (ROBAXIN) 500 MG Tab, 1-2 tabs po bid for muscle spasm, Disp: 40 tablet, Rfl: 1    methotrexate 2.5 MG Tab, Take 8 tablets (20 mg total) by mouth every 7 days. Take 4 tabs Mon am and 4 tabs Mon pm only, Disp: 96 tablet, Rfl: 0    multivitamin (THERAGRAN) per tablet, Take 1 tablet by mouth once daily., Disp: , Rfl:     omega-3 fatty acids/fish oil (FISH OIL-OMEGA-3 FATTY ACIDS) 300-1,000 mg capsule, Take by mouth once daily., Disp: , Rfl:  "    omeprazole (PRILOSEC) 20 MG capsule, Take 1 capsule (20 mg total) by mouth once daily., Disp: 30 capsule, Rfl: 11    omeprazole (PRILOSEC) 40 MG capsule, Take 1 capsule (40 mg total) by mouth once daily., Disp: 30 capsule, Rfl: 0    risperiDONE (RISPERDAL) 2 MG tablet, TAKE 1 TABLET(2 MG) BY MOUTH EVERY MORNING, Disp: 90 tablet, Rfl: 3    risperiDONE (RISPERDAL) 4 MG tablet, Take 1 tablet (4 mg total) by mouth every evening., Disp: 90 tablet, Rfl: 3    traMADoL (ULTRAM) 50 mg tablet, Take 1 tablet (50 mg total) by mouth every 6 (six) hours as needed for Pain., Disp: 30 each, Rfl: 0    venlafaxine (EFFEXOR-XR) 150 MG Cp24, Take 1 capsule (150 mg total) by mouth once daily., Disp: 90 capsule, Rfl: 3    VITAMIN A ORAL, Take by mouth., Disp: , Rfl:     vitamin D (VITAMIN D3) 1000 units Tab, Take 1,000 Units by mouth once daily., Disp: , Rfl:     vitamin E 200 UNIT capsule, Take 200 Units by mouth once daily., Disp: , Rfl:     zinc gluconate 50 mg tablet, Take 50 mg by mouth once daily., Disp: , Rfl:   No current facility-administered medications for this visit.    Facility-Administered Medications Ordered in Other Visits:     tropicamide 1% ophthalmic solution 1 drop, 1 drop, Right Eye, On Call Procedure, Karen Song MD, 1 drop at 08/01/19 0648    ALLERGIES:  Review of patient's allergies indicates:   Allergen Reactions    Etanercept Other (See Comments)     Other reaction(s): recurrent infections    Chloramphenicol sod succinate Hives    Codeine Other (See Comments)     Other reaction(s): Stomach upset. Pt states OK with Percocet    Nickel sutures [surgical stainless steel] Dermatitis     Allergic contact dermatitis    Adhesive Rash       PSYCHIATRIC EXAM:  There were no vitals filed for this visit.  Appearance:  Well groomed, appearing healthy and of stated age  Behavior:  Cooperative, pleasant, no psychomotor agitation or retardation  Speech:  Normal rate, rhythm, prosody, and volume  Mood:  "Pretty " "good"  Affect:  Euthymic  Thought Process:  Linear, logical, goal directed  Thought Content:  Negative for suicidal ideation, homicidal ideation, delusions or hallucinations.  No paranoia.    Associations:  Intact  Memory:  Fair  Level of Consciousness/Orientation:  Grossly intact  Fund of Knowledge:  Fair  Attention:  Good  Language:  Fluent, able to name abstract and concrete objects  Insight:  Fair  Judgment:  Intact  Psychomotor signs:  No involuntary movements or tremor today  Gait:  Normal, no shuffling gait      RELEVANT LABS/STUDIES:    AIMS 0 on 9/12/23    Lab Results   Component Value Date    WBC 7.83 08/09/2023    HGB 11.9 (L) 08/09/2023    HCT 37.7 08/09/2023    MCV 96 08/09/2023     08/09/2023     BMP  Lab Results   Component Value Date     (L) 08/09/2023    K 4.3 08/09/2023    CL 97 08/09/2023    CO2 27 08/09/2023    BUN 6 (L) 08/09/2023    CREATININE 0.9 08/09/2023    CALCIUM 9.1 08/09/2023    ANIONGAP 9 08/09/2023    ESTGFRAFRICA >60.0 04/13/2022    EGFRNONAA >60.0 04/13/2022     Lab Results   Component Value Date    ALT 12 08/09/2023    AST 19 08/09/2023    GGT 91 (H) 07/28/2014    ALKPHOS 63 08/09/2023    BILITOT 0.3 08/09/2023     Lab Results   Component Value Date    TSH 2.593 03/22/2023     Lab Results   Component Value Date    HGBA1C 5.3 12/11/2018       IMPRESSION:    Bhavna Landry is a 68 y.o. female with history of Paranoid Schizophrenia, Depression, and Insomnia who presents for follow up appointment.    Status/Progress:  Based on the examination today, the patient's problem(s) is/are  stable .  New problems have been presented today.    Risk Parameters:  Patient reports no suicidal ideation  Patient reports no homicidal ideation  Patient reports no self-injurious behavior  Patient reports no violent behavior      DIAGNOSES:    ICD-10-CM ICD-9-CM   1. Paranoid schizophrenia  F20.0 295.30   2. RASHIDA (generalized anxiety disorder)  F41.1 300.02   3. Recurrent major depressive " disorder, in full remission  F33.42 296.36   4. Mild cognitive impairment with memory loss  G31.84 331.83   5. Restless legs  G25.81 333.94   6. Injury of left foot, subsequent encounter  S99.922D V58.89     959.7   7. Hyponatremia  E87.1 276.1         PLAN:  Symptoms currently stable.  Anxiety has improved with higher dose of Effexor.  Sodium has remained stable.    Continue Effexor 150mg daily, Risperdal 2mg/4mg, Neurontin 300mg qHS, Cogentin 0.5mg BID.   Continue Neurontin 100mg daily PRN anxiety or Hydroxyzine 25mg PRN anxiety (rarely takes either).    Discussed with patient and daughter informed consent, risks versus benefits, alternative treatments, side effect profile and the inherent unpredictability of individual responses to these treatments.  The patient's daughter expresses understanding of the above and displays the capacity to agree with this current plan.    RETURN TO CLINIC:  Follow up in about 6 months (around 3/12/2024).

## 2023-09-13 ENCOUNTER — TELEPHONE (OUTPATIENT)
Dept: HEMATOLOGY/ONCOLOGY | Facility: CLINIC | Age: 68
End: 2023-09-13
Payer: MEDICARE

## 2023-09-13 DIAGNOSIS — C50.411 CARCINOMA OF UPPER-OUTER QUADRANT OF RIGHT BREAST IN FEMALE, ESTROGEN RECEPTOR POSITIVE: ICD-10-CM

## 2023-09-13 DIAGNOSIS — Z17.0 CARCINOMA OF UPPER-OUTER QUADRANT OF RIGHT BREAST IN FEMALE, ESTROGEN RECEPTOR POSITIVE: ICD-10-CM

## 2023-09-13 RX ORDER — TRAMADOL HYDROCHLORIDE 50 MG/1
50 TABLET ORAL EVERY 6 HOURS PRN
Qty: 30 EACH | Refills: 0 | Status: SHIPPED | OUTPATIENT
Start: 2023-09-13 | End: 2024-02-23

## 2023-09-13 NOTE — TELEPHONE ENCOUNTER
No care due was identified.  [facility] Embedded Care Due Messages. Reference number: [id number].   9/13/2023 1:16:42 PM CDT

## 2023-09-13 NOTE — TELEPHONE ENCOUNTER
----- Message from Curtis Chilel RN sent at 9/13/2023 11:15 AM CDT -----  Contact: @ [phone]    ----- Message -----  From: Christy Elias  Sent: 9/13/2023  11:13 AM CDT  To: Chari Pinto ([facility]) Staff    Pt called in regards to informing the doctor that she now has a fracture tow on the left foot and is there anything you would like her to do for the bone  .....Please call and adv @ [phone]

## 2023-09-13 NOTE — TELEPHONE ENCOUNTER
Pt requesting med refill, states she has a fx toe and they did not give her any pain medication. Pended for your review

## 2023-09-13 NOTE — TELEPHONE ENCOUNTER
----- Message from Lisa Lowry sent at 9/13/2023 12:54 PM CDT -----  Contact: [phone]  1MEDICALADVICE     Patient is calling for Medical Advice regarding: fractured her toe     How long has patient had these symptoms: yesterday     Pharmacy name and phone#:   [id number] - [place] - [address] AT [facility] [address]  [address] [address]  Phone: [phone] Fax: [phone]    Would like response via Chataloghart:   call back     Comments:   Pt is requesting a refill on her medication (traMADoL (ULTRAM) 50 mg tablet) due to a fractured toe. Pt says she went to urgent care where they x-rayed it. She was not given pain medicine. Please Advise

## 2023-09-13 NOTE — TELEPHONE ENCOUNTER
----- Message from Lisa Lowry sent at 9/13/2023 12:54 PM CDT -----  Contact: [phone]  1MEDICALADVICE     Patient is calling for Medical Advice regarding: fractured her toe     How long has patient had these symptoms: yesterday     Pharmacy name and phone#:   [facility] #[id number] - [place] - [address] AT [facility] [address]  [address] [address]  Phone: [phone] Fax: [phone]    Would like response via CORP80hart:   call back     Comments:   Pt is requesting a refill on her medication (traMADoL (ULTRAM) 50 mg tablet) due to a fractured toe. Pt says she went to urgent care where they x-rayed it. She was not given pain medicine. Please Advise

## 2023-09-19 ENCOUNTER — OFFICE VISIT (OUTPATIENT)
Dept: ORTHOPEDICS | Facility: CLINIC | Age: 68
End: 2023-09-19
Payer: MEDICARE

## 2023-09-19 VITALS — WEIGHT: 98.13 LBS | HEIGHT: 62 IN | BODY MASS INDEX: 18.06 KG/M2

## 2023-09-19 DIAGNOSIS — K21.9 GASTROESOPHAGEAL REFLUX DISEASE, UNSPECIFIED WHETHER ESOPHAGITIS PRESENT: ICD-10-CM

## 2023-09-19 PROCEDURE — 1159F MED LIST DOCD IN RCRD: CPT | Mod: CPTII,S$GLB,, | Performed by: PHYSICIAN ASSISTANT

## 2023-09-19 PROCEDURE — 4010F ACE/ARB THERAPY RXD/TAKEN: CPT | Mod: CPTII,S$GLB,, | Performed by: PHYSICIAN ASSISTANT

## 2023-09-19 PROCEDURE — 99999 PR PBB SHADOW E&M-EST. PATIENT-LVL IV: CPT | Mod: PBBFAC,,, | Performed by: PHYSICIAN ASSISTANT

## 2023-09-19 PROCEDURE — 3008F BODY MASS INDEX DOCD: CPT | Mod: CPTII,S$GLB,, | Performed by: PHYSICIAN ASSISTANT

## 2023-09-19 PROCEDURE — 4010F PR ACE/ARB THEARPY RXD/TAKEN: ICD-10-PCS | Mod: CPTII,S$GLB,, | Performed by: PHYSICIAN ASSISTANT

## 2023-09-19 PROCEDURE — 3008F PR BODY MASS INDEX (BMI) DOCUMENTED: ICD-10-PCS | Mod: CPTII,S$GLB,, | Performed by: PHYSICIAN ASSISTANT

## 2023-09-19 PROCEDURE — 99214 OFFICE O/P EST MOD 30 MIN: CPT | Mod: S$GLB,,, | Performed by: PHYSICIAN ASSISTANT

## 2023-09-19 PROCEDURE — 99214 PR OFFICE/OUTPT VISIT, EST, LEVL IV, 30-39 MIN: ICD-10-PCS | Mod: S$GLB,,, | Performed by: PHYSICIAN ASSISTANT

## 2023-09-19 PROCEDURE — 1125F AMNT PAIN NOTED PAIN PRSNT: CPT | Mod: CPTII,S$GLB,, | Performed by: PHYSICIAN ASSISTANT

## 2023-09-19 PROCEDURE — 1125F PR PAIN SEVERITY QUANTIFIED, PAIN PRESENT: ICD-10-PCS | Mod: CPTII,S$GLB,, | Performed by: PHYSICIAN ASSISTANT

## 2023-09-19 PROCEDURE — 99999 PR PBB SHADOW E&M-EST. PATIENT-LVL IV: ICD-10-PCS | Mod: PBBFAC,,, | Performed by: PHYSICIAN ASSISTANT

## 2023-09-19 PROCEDURE — 1159F PR MEDICATION LIST DOCUMENTED IN MEDICAL RECORD: ICD-10-PCS | Mod: CPTII,S$GLB,, | Performed by: PHYSICIAN ASSISTANT

## 2023-09-19 RX ORDER — OMEPRAZOLE 40 MG/1
40 CAPSULE, DELAYED RELEASE ORAL DAILY
Qty: 30 CAPSULE | Refills: 0 | Status: SHIPPED | OUTPATIENT
Start: 2023-09-19 | End: 2023-11-07

## 2023-09-19 RX ORDER — IBUPROFEN 600 MG/1
600 TABLET ORAL EVERY 6 HOURS PRN
Qty: 80 TABLET | Refills: 0 | Status: SHIPPED | OUTPATIENT
Start: 2023-09-19 | End: 2024-01-16

## 2023-09-19 NOTE — PROGRESS NOTES
Subjective:     Patient ID: Bhavna Landry is a 68 y.o. female.    Chief Complaint: Pain and Injury of the Left Foot    HPI    Patient is a 68 year old female who presented to urgent care on 09/12/23 with left foot injury after hitting her toe on her bed frame.   RADS: The 4th toe has recent fractures without significant displacement located at the tuft of the distal phalanx and shaft of the proximal phalanx.  No dislocation or other fracture is detected.  Some soft tissue swelling is present at the 4th toe also.  Joint spaces are satisfactory overall.  Patient has been treated in a short boot. She stated that her pain is somewhat controlled. She has ache  and throbbing. No tingling or shooting.     Review of Systems   Constitutional: Negative for chills and fever.   Cardiovascular:  Negative for chest pain.   Respiratory:  Negative for cough and shortness of breath.    Skin:  Negative for color change, dry skin, itching, nail changes, poor wound healing and rash.   Musculoskeletal:         Left 4th toe fracture    Neurological:  Negative for dizziness.   Psychiatric/Behavioral:  Negative for altered mental status. The patient is not nervous/anxious.    All other systems reviewed and are negative.      Objective:       General    Constitutional: She is oriented to person, place, and time. She appears well-developed and well-nourished. No distress.   HENT:   Head: Atraumatic.   Eyes: Conjunctivae are normal.   Cardiovascular:  Normal rate.            Pulmonary/Chest: Effort normal.   Neurological: She is alert and oriented to person, place, and time.   Psychiatric: She has a normal mood and affect. Her behavior is normal.         Left Ankle/Foot Exam   Left ankle exam is normal.    Range of Motion   The patient has normal left ankle ROM.     Muscle Strength   The patient has normal left ankle strength.    Other   Sensation: normal    Comments:  Swelling of 3-5 toes with bruising. Mostly of 4th         Physical  Exam  Constitutional:       General: She is not in acute distress.     Appearance: She is well-developed and well-nourished. She is not diaphoretic.   HENT:      Head: Atraumatic.   Eyes:      Conjunctiva/sclera: Conjunctivae normal.   Cardiovascular:      Rate and Rhythm: Normal rate.   Pulmonary:      Effort: Pulmonary effort is normal.   Neurological:      Mental Status: She is alert and oriented to person, place, and time.   Psychiatric:         Mood and Affect: Mood and affect normal.         Behavior: Behavior normal.         RADS: reviewed by myself:   The 4th toe has recent fractures without significant displacement located at the tuft of the distal phalanx and shaft of the proximal phalanx.  No dislocation or other fracture is detected.  Some soft tissue swelling is present at the 4th toe also.  Joint spaces are satisfactory overall.    Assessment:     Encounter Diagnosis   Name Primary?    Gastroesophageal reflux disease, unspecified whether esophagitis present        Plan:      Dicussed plan with the patient. At this time will treat in post op shoe. Weight bearing as tolerated in shoe. ROMAT of ankle none of toes. Avoid rolling over or bending toes. RICE to reduce pain and swelling. Prescription NSAID. Return to clinic in 2 weeks at that time x-ray of her foot is needed. NWB OOB

## 2023-09-22 ENCOUNTER — PATIENT MESSAGE (OUTPATIENT)
Dept: DERMATOLOGY | Facility: CLINIC | Age: 68
End: 2023-09-22
Payer: MEDICARE

## 2023-09-26 ENCOUNTER — CLINICAL SUPPORT (OUTPATIENT)
Dept: REHABILITATION | Facility: HOSPITAL | Age: 68
End: 2023-09-26
Payer: MEDICARE

## 2023-09-26 ENCOUNTER — HOSPITAL ENCOUNTER (OUTPATIENT)
Dept: RADIOLOGY | Facility: HOSPITAL | Age: 68
Discharge: HOME | End: 2023-09-26
Attending: REGISTERED NURSE
Payer: MEDICARE

## 2023-09-26 DIAGNOSIS — M47.812 CERVICAL SPONDYLOSIS: ICD-10-CM

## 2023-09-26 DIAGNOSIS — R53.1 DECREASED STRENGTH: ICD-10-CM

## 2023-09-26 DIAGNOSIS — M54.12 CERVICAL RADICULOPATHY: ICD-10-CM

## 2023-09-26 DIAGNOSIS — R29.898 DECREASED ROM OF NECK: Primary | ICD-10-CM

## 2023-09-26 DIAGNOSIS — M47.816 LUMBAR SPONDYLOSIS: ICD-10-CM

## 2023-09-26 PROCEDURE — 72141 MRI NECK SPINE W/O DYE: CPT | Mod: 26,,, | Performed by: RADIOLOGY

## 2023-09-26 PROCEDURE — 97140 MANUAL THERAPY 1/> REGIONS: CPT | Mod: PN

## 2023-09-26 PROCEDURE — 72141 MRI NECK SPINE W/O DYE: CPT | Mod: TC

## 2023-09-26 PROCEDURE — 72141 MRI CERVICAL SPINE WITHOUT CONTRAST: ICD-10-PCS | Mod: 26,,, | Performed by: RADIOLOGY

## 2023-09-26 PROCEDURE — 97110 THERAPEUTIC EXERCISES: CPT | Mod: PN

## 2023-09-26 PROCEDURE — 97161 PT EVAL LOW COMPLEX 20 MIN: CPT | Mod: PN
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
## 2023-09-28 ENCOUNTER — CLINICAL SUPPORT (OUTPATIENT)
Dept: REHABILITATION | Facility: HOSPITAL | Age: 68
End: 2023-09-28
Payer: MEDICARE

## 2023-09-28 DIAGNOSIS — R29.898 DECREASED ROM OF NECK: ICD-10-CM

## 2023-09-28 DIAGNOSIS — R53.1 DECREASED STRENGTH: Primary | ICD-10-CM

## 2023-09-28 PROCEDURE — 97140 MANUAL THERAPY 1/> REGIONS: CPT | Mod: PN,CQ

## 2023-09-28 PROCEDURE — 97110 THERAPEUTIC EXERCISES: CPT | Mod: PN,CQ

## 2023-09-28 NOTE — PROGRESS NOTES
"[facility]   Physical Therapy Treatment Note      Name: Bhavna Landry  Clinic Number: [id number]    Therapy Diagnosis:   Encounter Diagnoses   Name Primary?    Decreased strength Yes    Decreased ROM of neck      Physician: Mode Cornelius MD    Visit Date: 9/28/2023  Physician Orders: PT Eval and Treat   Medical Diagnosis from Referral:   M47.812 (ICD-10-CM) - Cervical spondylosis   M47.816 (ICD-10-CM) - Lumbar spondylosis      Evaluation Date: 9/26/2023  Authorization Period Expiration: 11/21/2023  Plan of Care Expiration: 11/24/23  Visit # / Visits authorized: 2/1  FOTO: 2/5  PTA Visit: 1/6     Time In: 8:20 AM  Time Out: 9:00 AM  Total Billable Time: 30 minutes (1 TE +1MT)     Precautions: Standard, Fibromyalgia, Migraines, Schizophrenia (stable)    Subjective     Patient reports: "My neck is hurting today". Pt agreeable to PT session.  She was compliant with home exercise program.  Response to previous treatment: initial evaluation previous  Functional change: none reported     Pain: 5/10 constant  Location: bilateral cervical      Objective      Objective Measures updated at progress report unless specified.     Treatment     Jessica received the treatments listed below:      therapeutic exercises to develop strength, ROM, and flexibility for 20 minutes including:  Chin tucks: 10x5''  Cervical rotations: 5x5" B  Scapular retractions: 10x5''    manual therapy techniques: Joint mobilizations and Soft tissue Mobilization were applied to the: B UT for 10 minutes, including:  -STM B UT  -STM cervical / upper thoracic paraspinals    neuromuscular re-education activities to improve: Balance, Kinesthetic, Sense, and Proprioception for 00 minutes. The following activities were included:      therapeutic activities to improve functional performance for 00  minutes, including:    Patient Education and Home Exercises       Education provided:   - cont HEP  -manual therapy " expectations    Written Home Exercises Provided: Patient instructed to cont prior HEP. Exercises were reviewed and Jessica was able to demonstrate them prior to the end of the session.  Jessica demonstrated fair  understanding of the education provided. See Electronic Medical Record under Patient Instructions for exercises provided during therapy sessions    Assessment     Pt completed today's first PT session, arrived with reports of moderate B UT pain/ tightness. She responded well to manual therapy coupled with ROM cervical therex. She was able to follow verbal /tactile instructions with no adverse reactions reported. Reviewed importance of postural awareness in standing/ sitting positions to reduce B UT soreness.   Jessica Is progressing well towards her goals.   Patient prognosis is Fair.     Patient will continue to benefit from skilled outpatient physical therapy to address the deficits listed in the problem list box on initial evaluation, provide pt/family education and to maximize pt's level of independence in the home and community environment.     Patient's spiritual, cultural and educational needs considered and pt agreeable to plan of care and goals.     Anticipated barriers to physical therapy: RA, comorbidities, cervical scoliosis        GOALS: Short Term Goals: 4 weeks  1. Pt will demo good deep neck flexor strength to improve cervical lordosis and stabilization.  2. Increase cervical rotation ROM by 5-10 degrees Bilaterally in order to perform ADLs with decreased difficulty.  3. Pt will demo good sitting and standing posture for improved spine health and decreased neck pain.  4. Pt to tolerate HEP to improve ROM and independence with ADL's.     Long Term Goals: 8 weeks  1. Report decreased neck pain </=2/10 to increase tolerance for ADLs.  2. Increase cervical AROM to WFL with min neck pain for increased functional mobility.  3. Increased strength in B shoulders/scapular stabilizers to >/= 4/5 MMT  grade to increase tolerance for ADL and work activities.  4. Pt will report at </= TBD% impaired on FOTO neck score for neck pain disability to demonstrate decrease in disability and improvement in neck pain.      Plan     Cont to advance PT within tolerance. Monitor response to session.    Vern Uribe, SCOTT

## 2023-10-02 ENCOUNTER — TELEPHONE (OUTPATIENT)
Dept: ORTHOPEDICS | Facility: CLINIC | Age: 68
End: 2023-10-02
Payer: MEDICARE

## 2023-10-02 RX ORDER — FLUTICASONE PROPIONATE 50 MCG
1 SPRAY, SUSPENSION (ML) NASAL DAILY
Qty: 11.1 ML | Refills: 2 | Status: SHIPPED | OUTPATIENT
Start: 2023-10-02 | End: 2023-10-06

## 2023-10-03 ENCOUNTER — TELEPHONE (OUTPATIENT)
Dept: ORTHOPEDICS | Facility: CLINIC | Age: 68
End: 2023-10-03
Payer: MEDICARE

## 2023-10-03 ENCOUNTER — CLINICAL SUPPORT (OUTPATIENT)
Dept: REHABILITATION | Facility: HOSPITAL | Age: 68
End: 2023-10-03
Payer: MEDICARE

## 2023-10-03 DIAGNOSIS — R53.1 DECREASED STRENGTH: Primary | ICD-10-CM

## 2023-10-03 DIAGNOSIS — R29.898 DECREASED ROM OF NECK: ICD-10-CM

## 2023-10-03 PROCEDURE — 97112 NEUROMUSCULAR REEDUCATION: CPT | Mod: PN,CQ

## 2023-10-03 PROCEDURE — 97140 MANUAL THERAPY 1/> REGIONS: CPT | Mod: PN,CQ

## 2023-10-03 PROCEDURE — 97110 THERAPEUTIC EXERCISES: CPT | Mod: PN,CQ

## 2023-10-03 NOTE — PROGRESS NOTES
Established Patient - Audio Only Telehealth Visit     The patient location is: home  The chief complaint leading to consultation is: MRI results  Visit type: Virtual visit with audio only (telephone)  Total time spent with patient: 15min     The reason for the audio only service rather than synchronous audio and video virtual visit was related to technical difficulties or patient preference/necessity.     Each patient to whom I provide medical services by telemedicine is:  (1) informed of the relationship between the physician and patient and the respective role of any other health care provider with respect to management of the patient; and (2) notified that they may decline to receive medical services by telemedicine and may withdraw from such care at any time. Patient verbally consented to receive this service via voice-only telephone call.    DATE: 10/2/2023  PATIENT: Bhavna Landry    Attending Physician: Binh Ho M.D.    HISTORY:  Bhavna Landry is a 68 y.o. female who returns to me today for MRI results.  She was last seen by me 9/8/2023.  Today she is doing well but notes neck and left arm pain (Neck - 5, Arm - 6).  The patient endorses myelopathic symptoms such as handwriting changes or difficulty with buttons/coins/keys with her left hand; this is new since her last visit with me. Denies perineal paresthesias, bowel/bladder dysfunction.    PMH/PSH/FamHx/SocHx:  Unchanged from prior visit    ROS:  REVIEW OF SYSTEMS:  Constitution: Negative. Negative for chills, fever and night sweats.   HENT: Negative for congestion and headaches.    Eyes: Negative for blurred vision, left vision loss and right vision loss.   Cardiovascular: Negative for chest pain and syncope.   Respiratory: Negative for cough and shortness of breath.    Endocrine: Negative for polydipsia, polyphagia and polyuria.   Hematologic/Lymphatic: Negative for bleeding problem. Does not bruise/bleed easily.   Skin: Negative for dry skin,  itching and rash.   Musculoskeletal: Negative for falls and muscle weakness.   Gastrointestinal: Negative for abdominal pain and bowel incontinence.   Allergic/Immunologic: Negative for hives and persistent infections.  Genitourinary: Negative for urinary retention/incontinence and nocturia.   Neurological: negative for disturbances in coordination, no myelopathic symptoms such as handwriting changes or difficulty with buttons, coins, keys or small objects. No loss of balance and seizures.   Psychiatric/Behavioral: Negative for depression. The patient does not have insomnia.   Denies myelopathic symptoms, perineal paresthesias, bowel or bladder incontinence    EXAM:  There were no vitals taken for this visit.  Stable.     IMAGING:    Today I personally re-reviewed AP, Lat and Flex/Ex  upright C-spine films that demonstrate dextroconvex curvature.  DJD between C3 and C7. There is instability of C2 on C3; C3 on C5 and C5 on C6.      Cervical MRI shows moderate spinal canal stenosis noted at C3-C4 and C5-C6.  Moderate to severe neural foraminal narrowing noted at C3-C4 and C5-T1.    There is no height or weight on file to calculate BMI.    Hemoglobin A1C   Date Value Ref Range Status   12/11/2018 5.3 4.0 - 5.6 % Final     Comment:     ADA Screening Guidelines:  5.7-6.4%  Consistent with prediabetes  >or=6.5%  Consistent with diabetes  High levels of fetal hemoglobin interfere with the HbA1C  assay. Heterozygous hemoglobin variants (HbS, HgC, etc)do  not significantly interfere with this assay.   However, presence of multiple variants may affect accuracy.     05/26/2014 <4.0 (L) 4.5 - 6.2 % Final   10/26/2009 3.9 (L) 4.0 - 6.2 % Final         ASSESSMENT/PLAN:    There are no diagnoses linked to this encounter.    Cervical SOPHY ordered, she may follow up 2 weeks after if her pain persists. We will discuss surgery at that time.     I had a discussion with the patient regarding cervical myelopathy. I discussed the known  stepwise degeneration of cervical myelopathy. I discussed the risks and benefits of decompressive surgery, including the concept that surgery would be done to prevent further neurological injury/degeneration rather than to ameliorate any existing deficits.      This service was not originating from a related E/M service provided within the previous 7 days nor will  to an E/M service or procedure within the next 24 hours or my soonest available appointment.  Prevailing standard of care was able to be met in this audio-only visit.

## 2023-10-03 NOTE — PROGRESS NOTES
"[facility]   Physical Therapy Treatment Note      Name: Bhavna Landry  Clinic Number: [id number]    Therapy Diagnosis:   Encounter Diagnoses   Name Primary?    Decreased strength Yes    Decreased ROM of neck      Physician: Mode Cornelius MD    Visit Date: 10/3/2023  Physician Orders: PT Eval and Treat   Medical Diagnosis from Referral:   M47.812 (ICD-10-CM) - Cervical spondylosis   M47.816 (ICD-10-CM) - Lumbar spondylosis      Evaluation Date: 9/26/2023  Authorization Period Expiration: 11/21/2023  Plan of Care Expiration: 11/24/23  Visit # / Visits authorized: 2/1  FOTO: 2/5  PTA Visit: 1/6     Time In: 8:20 AM  Time Out: 9:00 AM  Total Billable Time: 55 minutes (2 TE +1MT +1NMR)     Precautions: Standard, Fibromyalgia, Migraines, Schizophrenia (stable)    Subjective     Patient reports: Feels worse after her HEP. Pt agreeable to PT session.  She was compliant with home exercise program.  Response to previous treatment: initial evaluation previous  Functional change: none reported     Pain: 6/10 constant  Location: bilateral cervical      Objective      Objective Measures updated at progress report unless specified.     Treatment     Jessica received the treatments listed below:      therapeutic exercises to develop strength, ROM, and flexibility for 35 minutes including:  Cervical rotations with towel roll support : 10x5" B - cues to keep in pain free range  Cervical flexion/extension with towel roll support 10x5"ea - cues to keep in pain free range  Supine wand flexion 2 x 10 0#  Supine chest press 2 x 10 0#  Supine cervical isometric 5" x 10 ea - therapist hold  Seated cervical snags 3" x 10ea     manual therapy techniques: Joint mobilizations and Soft tissue Mobilization were applied to the: B UT for 10 minutes, including:  -STM B UT  -STM cervical / upper thoracic paraspinals      neuromuscular re-education activities to improve: Balance, Kinesthetic, Sense, and Proprioception for " "8 minutes. The following activities were included:  Chin tucks: 15x5''  Chin tuck + rotation 5" x 5ea  Scapular retractions: 15x5'' - cues to keep in pain free range    therapeutic activities to improve functional performance for 00  minutes, including:    Patient Education and Home Exercises       Education provided:   - cont HEP  -manual therapy expectations    Written Home Exercises Provided: Patient instructed to cont prior HEP. Exercises were reviewed and Jessica was able to demonstrate them prior to the end of the session.  Jessica demonstrated fair  understanding of the education provided. See Electronic Medical Record under Patient Instructions for exercises provided during therapy sessions    Assessment     Presents with moderate level pain. Reports HEP does not help and worsens pain. Reviewed HEP with patient and when cued to complete in pain free range tolerated much better. Advised patient to only complete HEP in pain free range. Tolerated other progressions to address cervical/shoulder mobility and strength well. No increased pain post session. Will monitor patient response to session and adjust intensity as needed.     Jessica Is progressing well towards her goals.   Patient prognosis is Fair.     Patient will continue to benefit from skilled outpatient physical therapy to address the deficits listed in the problem list box on initial evaluation, provide pt/family education and to maximize pt's level of independence in the home and community environment.     Patient's spiritual, cultural and educational needs considered and pt agreeable to plan of care and goals.     Anticipated barriers to physical therapy: RA, comorbidities, cervical scoliosis        GOALS: Short Term Goals: 4 weeks  1. Pt will demo good deep neck flexor strength to improve cervical lordosis and stabilization.  2. Increase cervical rotation ROM by 5-10 degrees Bilaterally in order to perform ADLs with decreased difficulty.  3. Pt will " demo good sitting and standing posture for improved spine health and decreased neck pain.  4. Pt to tolerate HEP to improve ROM and independence with ADL's.     Long Term Goals: 8 weeks  1. Report decreased neck pain </=2/10 to increase tolerance for ADLs.  2. Increase cervical AROM to WFL with min neck pain for increased functional mobility.  3. Increased strength in B shoulders/scapular stabilizers to >/= 4/5 MMT grade to increase tolerance for ADL and work activities.  4. Pt will report at </= TBD% impaired on FOTO neck score for neck pain disability to demonstrate decrease in disability and improvement in neck pain.      Plan     Cont to advance PT within tolerance. Monitor response to session.    Gisela Mcgrath, PTA

## 2023-10-04 ENCOUNTER — OFFICE VISIT (OUTPATIENT)
Dept: ORTHOPEDICS | Facility: CLINIC | Age: 68
End: 2023-10-04
Payer: MEDICARE

## 2023-10-04 ENCOUNTER — HOSPITAL ENCOUNTER (OUTPATIENT)
Dept: RADIOLOGY | Facility: HOSPITAL | Age: 68
Discharge: HOME | End: 2023-10-04
Attending: INTERNAL MEDICINE
Payer: MEDICARE

## 2023-10-04 DIAGNOSIS — M54.12 CERVICAL RADICULOPATHY: Primary | ICD-10-CM

## 2023-10-04 DIAGNOSIS — M50.30 DDD (DEGENERATIVE DISC DISEASE), CERVICAL: ICD-10-CM

## 2023-10-04 DIAGNOSIS — M79.672 LEFT FOOT PAIN: ICD-10-CM

## 2023-10-04 PROCEDURE — 99442 PR PHYSICIAN TELEPHONE EVALUATION 11-20 MIN: CPT | Mod: 95,,, | Performed by: REGISTERED NURSE

## 2023-10-04 PROCEDURE — 99442 PR PHYSICIAN TELEPHONE EVALUATION 11-20 MIN: ICD-10-PCS | Mod: 95,,, | Performed by: REGISTERED NURSE

## 2023-10-04 PROCEDURE — 1159F MED LIST DOCD IN RCRD: CPT | Mod: CPTII,95,, | Performed by: REGISTERED NURSE

## 2023-10-04 PROCEDURE — 73630 XR FOOT COMPLETE 3 VIEW LEFT: ICD-10-PCS | Mod: 26,LT,, | Performed by: RADIOLOGY

## 2023-10-04 PROCEDURE — 4010F ACE/ARB THERAPY RXD/TAKEN: CPT | Mod: CPTII,95,, | Performed by: REGISTERED NURSE

## 2023-10-04 PROCEDURE — 73630 X-RAY EXAM OF FOOT: CPT | Mod: TC,FY,PO,LT

## 2023-10-04 PROCEDURE — 1160F PR REVIEW ALL MEDS BY PRESCRIBER/CLIN PHARMACIST DOCUMENTED: ICD-10-PCS | Mod: CPTII,95,, | Performed by: REGISTERED NURSE

## 2023-10-04 PROCEDURE — 1159F PR MEDICATION LIST DOCUMENTED IN MEDICAL RECORD: ICD-10-PCS | Mod: CPTII,95,, | Performed by: REGISTERED NURSE

## 2023-10-04 PROCEDURE — 4010F PR ACE/ARB THEARPY RXD/TAKEN: ICD-10-PCS | Mod: CPTII,95,, | Performed by: REGISTERED NURSE

## 2023-10-04 PROCEDURE — 1160F RVW MEDS BY RX/DR IN RCRD: CPT | Mod: CPTII,95,, | Performed by: REGISTERED NURSE

## 2023-10-04 PROCEDURE — 73630 X-RAY EXAM OF FOOT: CPT | Mod: 26,LT,, | Performed by: RADIOLOGY

## 2023-10-05 ENCOUNTER — CLINICAL SUPPORT (OUTPATIENT)
Dept: REHABILITATION | Facility: HOSPITAL | Age: 68
End: 2023-10-05
Payer: MEDICARE

## 2023-10-05 DIAGNOSIS — R29.898 DECREASED ROM OF NECK: ICD-10-CM

## 2023-10-05 DIAGNOSIS — R53.1 DECREASED STRENGTH: Primary | ICD-10-CM

## 2023-10-05 PROCEDURE — 97110 THERAPEUTIC EXERCISES: CPT | Mod: PN,CQ

## 2023-10-05 PROCEDURE — 97140 MANUAL THERAPY 1/> REGIONS: CPT | Mod: PN,CQ

## 2023-10-05 PROCEDURE — 97112 NEUROMUSCULAR REEDUCATION: CPT | Mod: PN,CQ

## 2023-10-05 NOTE — PROGRESS NOTES
Subjective:     Patient ID: Bhavna Landry is a 68 y.o. female.    Chief Complaint: No chief complaint on file.    HPI    Patient is a 68 year old female who presented to urgent care on 09/12/23 with left foot injury after hitting her toe on her bed frame.   RADS: The 4th toe has recent fractures without significant displacement located at the tuft of the distal phalanx and shaft of the proximal phalanx.  No dislocation or other fracture is detected.  Some soft tissue swelling is present at the 4th toe also.  Joint spaces are satisfactory overall.  Patient has been treated in a short boot. She stated that her pain is somewhat controlled. She has ache  and throbbing. No tingling or shooting.     10/06/23: Over all doing well. Has been compliant with post op shoe. Pain is batter but . She does reports hitting it with her trash can recently which increased her pain.     Review of Systems   Constitutional: Negative for chills and fever.   Cardiovascular:  Negative for chest pain.   Respiratory:  Negative for cough and shortness of breath.    Skin:  Negative for color change, dry skin, itching, nail changes, poor wound healing and rash.   Musculoskeletal:         Left 4th toe fracture    Neurological:  Negative for dizziness.   Psychiatric/Behavioral:  Negative for altered mental status. The patient is not nervous/anxious.    All other systems reviewed and are negative.      Objective:       General    Constitutional: She is oriented to person, place, and time. She appears well-developed and well-nourished. No distress.   HENT:   Head: Atraumatic.   Eyes: Conjunctivae are normal.   Cardiovascular:  Normal rate.            Pulmonary/Chest: Effort normal.   Neurological: She is alert and oriented to person, place, and time.   Psychiatric: She has a normal mood and affect. Her behavior is normal.         Left Ankle/Foot Exam   Left ankle exam is normal.    Range of Motion   The patient has normal left ankle ROM.      Muscle Strength   The patient has normal left ankle strength.    Other   Sensation: normal    Comments:  Swelling of 3-5 toes with bruising. Mostly of 4th       Physical Exam  Constitutional:       General: She is not in acute distress.     Appearance: She is well-developed and well-nourished. She is not diaphoretic.   HENT:      Head: Atraumatic.   Eyes:      Conjunctiva/sclera: Conjunctivae normal.   Cardiovascular:      Rate and Rhythm: Normal rate.   Pulmonary:      Effort: Pulmonary effort is normal.   Neurological:      Mental Status: She is alert and oriented to person, place, and time.   Psychiatric:         Mood and Affect: Mood and affect normal.         Behavior: Behavior normal.       RADS: reviewed by myself:   The 4th toe has recent fractures without significant displacement located at the tuft of the distal phalanx and shaft of the proximal phalanx.  No dislocation or other fracture is detected.  Some soft tissue swelling is present at the 4th toe also.  Joint spaces are satisfactory overall.    Assessment:     Encounter Diagnosis   Name Primary?    Left foot pain Yes         Plan:      Dicussed plan with the patient. At this time will treat in post op shoe. Weight bearing as tolerated in shoe. ROMAT of ankle none of toes. Avoid rolling over or bending toes. RICE to reduce pain and swelling. Prescription NSAID. Return to clinic in 4 weeks at that time x-ray of her foot is needed. NWB OOB Consider wean post op shoe to regular hard sole shoe.

## 2023-10-05 NOTE — PROGRESS NOTES
"[facility]   Physical Therapy Treatment Note      Name: Bhavna Landry  Clinic Number: [id number]    Therapy Diagnosis:   Encounter Diagnoses   Name Primary?    Decreased strength Yes    Decreased ROM of neck      Physician: Mode Cornelius MD    Visit Date: 10/5/2023  Physician Orders: PT Eval and Treat   Medical Diagnosis from Referral:   M47.812 (ICD-10-CM) - Cervical spondylosis   M47.816 (ICD-10-CM) - Lumbar spondylosis      Evaluation Date: 9/26/2023  Authorization Period Expiration: 11/21/2023  Plan of Care Expiration: 11/24/23  Visit # / Visits authorized: 3/1  FOTO: 4/5  PTA Visit:2/26     Time In:  11:00 AM  Time Out: 11:55 AM  Total Billable Time: 55 minutes (2 TE +1MT +1NMR)     Precautions: Standard, Fibromyalgia, Migraines, Schizophrenia (stable)    Subjective     Patient reports: reports she is consulting for injections in neck for pain control.   She was compliant with home exercise program.  Response to previous treatment: reports slight increased cervical pain today  Functional change: none reported     Pain: ARRIVAL: 6/10 constant (ache / soreness)             END:  4/10 Ache  Location: bilateral cervical      Objective      Objective Measures updated at progress report unless specified.     Treatment     Jessica received the treatments listed below:      therapeutic exercises to develop strength, ROM, and flexibility for 30 minutes including:  Cervical rotations with towel roll support : 10x5" B - cues to keep in pain free range  Cervical flexion/extension with towel roll support 10x5"ea - cues to keep in pain free range  -Supine wand flexion 2 x 10 1#  -Supine chest press 2 x 10 1#  -Supine cervical isometric 5" x 10 ea - therapist hold  -Seated cervical snags 3" x 10ea     manual therapy techniques: Joint mobilizations and Soft tissue Mobilization were applied to the: B UT for 10 minutes, including:  -STM B UT  -STM cervical / upper thoracic " "paraspinals    neuromuscular re-education activities to improve: Balance, Kinesthetic, Sense, and Proprioception for 15 minutes. The following activities were included:  -Chin tucks: 15x5''  -Chin tuck + rotation 5" x 5ea  -Scapular retractions: 15x5'' - cues to keep in pain free range    therapeutic activities to improve functional performance for 00  minutes, including:    Patient Education and Home Exercises       Education provided:   - cont HEP  -manual therapy expectations    Written Home Exercises Provided: Patient instructed to cont prior HEP. Exercises were reviewed and Jessica was able to demonstrate them prior to the end of the session.  Jessica demonstrated fair  understanding of the education provided. See Electronic Medical Record under Patient Instructions for exercises provided during therapy sessions    Assessment     Presents with moderate level pain today 6/10 pain scale in cervical and L UT, upon completion of session she reported decreased pain 4/10 in cervical and L UT. Palpable UT tissue tension, improved with manual therapy coupled with cervical ROM therex today. No adverse reactions to today's session.      Jessica Is progressing well towards her goals.   Patient prognosis is Fair.     Patient will continue to benefit from skilled outpatient physical therapy to address the deficits listed in the problem list box on initial evaluation, provide pt/family education and to maximize pt's level of independence in the home and community environment.     Patient's spiritual, cultural and educational needs considered and pt agreeable to plan of care and goals.     Anticipated barriers to physical therapy: RA, comorbidities, cervical scoliosis        GOALS: Short Term Goals: 4 weeks  1. Pt will demo good deep neck flexor strength to improve cervical lordosis and stabilization.  2. Increase cervical rotation ROM by 5-10 degrees Bilaterally in order to perform ADLs with decreased difficulty.  3. Pt will " demo good sitting and standing posture for improved spine health and decreased neck pain.  4. Pt to tolerate HEP to improve ROM and independence with ADL's.     Long Term Goals: 8 weeks  1. Report decreased neck pain </=2/10 to increase tolerance for ADLs.  2. Increase cervical AROM to WFL with min neck pain for increased functional mobility.  3. Increased strength in B shoulders/scapular stabilizers to >/= 4/5 MMT grade to increase tolerance for ADL and work activities.  4. Pt will report at </= TBD% impaired on FOTO neck score for neck pain disability to demonstrate decrease in disability and improvement in neck pain.      Plan     Cont to advance PT within tolerance.    Vern Uribe, PTA

## 2023-10-06 ENCOUNTER — TELEPHONE (OUTPATIENT)
Dept: OTOLARYNGOLOGY | Facility: CLINIC | Age: 68
End: 2023-10-06
Payer: MEDICARE

## 2023-10-06 ENCOUNTER — TELEPHONE (OUTPATIENT)
Dept: ORTHOPEDICS | Facility: CLINIC | Age: 68
End: 2023-10-06
Payer: MEDICARE

## 2023-10-06 ENCOUNTER — OFFICE VISIT (OUTPATIENT)
Dept: ORTHOPEDICS | Facility: CLINIC | Age: 68
End: 2023-10-06
Payer: MEDICARE

## 2023-10-06 ENCOUNTER — HOSPITAL ENCOUNTER (OUTPATIENT)
Dept: RADIOLOGY | Facility: HOSPITAL | Age: 68
Discharge: HOME | End: 2023-10-06
Attending: PHYSICIAN ASSISTANT
Payer: MEDICARE

## 2023-10-06 VITALS — WEIGHT: 98.13 LBS | BODY MASS INDEX: 18.06 KG/M2 | HEIGHT: 62 IN

## 2023-10-06 DIAGNOSIS — M79.672 LEFT FOOT PAIN: Primary | ICD-10-CM

## 2023-10-06 DIAGNOSIS — J31.0 CHRONIC RHINITIS: ICD-10-CM

## 2023-10-06 DIAGNOSIS — M79.672 LEFT FOOT PAIN: ICD-10-CM

## 2023-10-06 PROCEDURE — 99213 OFFICE O/P EST LOW 20 MIN: CPT | Mod: S$GLB,,, | Performed by: PHYSICIAN ASSISTANT

## 2023-10-06 PROCEDURE — 99213 PR OFFICE/OUTPT VISIT, EST, LEVL III, 20-29 MIN: ICD-10-PCS | Mod: S$GLB,,, | Performed by: PHYSICIAN ASSISTANT

## 2023-10-06 PROCEDURE — 1159F PR MEDICATION LIST DOCUMENTED IN MEDICAL RECORD: ICD-10-PCS | Mod: CPTII,S$GLB,, | Performed by: PHYSICIAN ASSISTANT

## 2023-10-06 PROCEDURE — 3008F PR BODY MASS INDEX (BMI) DOCUMENTED: ICD-10-PCS | Mod: CPTII,S$GLB,, | Performed by: PHYSICIAN ASSISTANT

## 2023-10-06 PROCEDURE — 99999 PR PBB SHADOW E&M-EST. PATIENT-LVL IV: CPT | Mod: PBBFAC,,, | Performed by: PHYSICIAN ASSISTANT

## 2023-10-06 PROCEDURE — 1159F MED LIST DOCD IN RCRD: CPT | Mod: CPTII,S$GLB,, | Performed by: PHYSICIAN ASSISTANT

## 2023-10-06 PROCEDURE — 4010F ACE/ARB THERAPY RXD/TAKEN: CPT | Mod: CPTII,S$GLB,, | Performed by: PHYSICIAN ASSISTANT

## 2023-10-06 PROCEDURE — 1125F AMNT PAIN NOTED PAIN PRSNT: CPT | Mod: CPTII,S$GLB,, | Performed by: PHYSICIAN ASSISTANT

## 2023-10-06 PROCEDURE — 3008F BODY MASS INDEX DOCD: CPT | Mod: CPTII,S$GLB,, | Performed by: PHYSICIAN ASSISTANT

## 2023-10-06 PROCEDURE — 4010F PR ACE/ARB THEARPY RXD/TAKEN: ICD-10-PCS | Mod: CPTII,S$GLB,, | Performed by: PHYSICIAN ASSISTANT

## 2023-10-06 PROCEDURE — 1125F PR PAIN SEVERITY QUANTIFIED, PAIN PRESENT: ICD-10-PCS | Mod: CPTII,S$GLB,, | Performed by: PHYSICIAN ASSISTANT

## 2023-10-06 PROCEDURE — 99999 PR PBB SHADOW E&M-EST. PATIENT-LVL IV: ICD-10-PCS | Mod: PBBFAC,,, | Performed by: PHYSICIAN ASSISTANT

## 2023-10-06 RX ORDER — FLUTICASONE PROPIONATE 50 MCG
2 SPRAY, SUSPENSION (ML) NASAL DAILY
Qty: 16 G | Refills: 2 | Status: SHIPPED | OUTPATIENT
Start: 2023-10-06 | End: 2024-01-04

## 2023-10-06 NOTE — TELEPHONE ENCOUNTER
----- Message from Kasey Fields MA sent at 10/2/2023  2:23 PM CDT -----  Regarding: FW: Refill request  Contact: [phone]    ----- Message -----  From: Josi Worrell  Sent: 10/2/2023   1:54 PM CDT  To: Russ[facility] Armstrong Staff  Subject: Refill request                                   Rx Refill/Request         Is this a Refill or New Rx:  Refill    Rx Name and Strength:  fluticasone propionate (FLONASE) 50 mcg/actuation nasal spray    Preferred Pharmacy with phone number:  [id number] - [place] - [address] AT [facility] [address]  [address] [address]  Phone: [phone] Fax: [phone]      Communication Preference:[phone]  Additional Information:  Pt says she is unable to come in at this time.

## 2023-10-06 NOTE — TELEPHONE ENCOUNTER
Spoke to patient regarding message. Stated to patient that someone from the pain management  office will be contacting her to get her scheduled for an appointment for her injection. Patient stated thank you. Thanks.

## 2023-10-09 ENCOUNTER — TELEPHONE (OUTPATIENT)
Dept: PAIN MEDICINE | Facility: CLINIC | Age: 68
End: 2023-10-09
Payer: MEDICARE

## 2023-10-09 DIAGNOSIS — M50.30 DDD (DEGENERATIVE DISC DISEASE), CERVICAL: ICD-10-CM

## 2023-10-09 DIAGNOSIS — M54.12 CERVICAL RADICULOPATHY: Primary | ICD-10-CM

## 2023-10-09 NOTE — TELEPHONE ENCOUNTER
Can you tell me what type of injection pt is getting on the 7th? Pt is schedule for an epidural steroid injection on 11/10 with Dr Jensen.

## 2023-10-09 NOTE — TELEPHONE ENCOUNTER
----- Message from Najma Dobson MA sent at 10/9/2023 12:48 PM CDT -----    ----- Message -----  From: Carol Ann Brown  Sent: 10/9/2023  11:32 AM CDT  To: Mikey Londono Staff    Type:  Needs Medical Advice    Who Called: pt  Symptoms (please be specific): pt has epidural for her bones on 7th and getting a shot on the 10th with Dr Londono and need to know if its safe to get them both please call pt right back pt is worried   Would the patient rather a call back or a response via [facility]? call  Best Call Back Number: [phone]  Additional Information:

## 2023-10-09 NOTE — TELEPHONE ENCOUNTER
----- Message from Spring Jensen MD sent at 10/5/2023  5:26 PM CDT -----  Regarding: RE: Order for PHUONG HAZEL  Sounds good,    Would you mind putting the order in for me?    Thanks,    Spring    ----- Message -----  From: Nevaeh Centeno MA  Sent: 10/5/2023   4:09 PM CDT  To: Spring Jensen MD  Subject: RE: Order for PHUONG HAZEL                       ----- Message -----  From: FRANDY Moss NP  Sent: 10/4/2023   7:49 AM CDT  To: [facility] Schedulers  Subject: Order for PHUONG HAZEL                           Patient Name: PHUONG HAZEL([id number])  Sex: Female  : 1955      PCP: MILLIE TELLO    Center: [facility] CENTRAL BILLING OFFICE     Level of Service:80145     AK OFFICE/OUTPT VISIT, EST, LEVL III, 20-29 MIN    Types of orders made on 10/04/2023: Procedure Request    Order Date:10/4/2023  Ordering User:ANDRES MOSS [[id number]]  Encounter Provider:FRANDY Moss NP [[id number]]  Authorizing Provider: FRANDY Moss NP [[id number]]  Supervising Provider:ALDA AVILES [[id number]]  Type of Supervision:Collaborating Physician  Department:[facility][[id number]]    Common Order Information  Procedure -> Epidural Injection (specify level) Cmt: C6/7    Order Specific Information  Order: Procedure Order to Pain Management [Custom: RE  F168]  Order #:          [id number]: [id number] FUTURE    Priority: Routine  Class: Clinic Performed    Future Order Information      Expires on:10/04/2024            Expected by:10/04/2023                   Associated Diagnoses      M54.12 Cervical radiculopathy      M50.30 DDD (degenerative disc disease), cervical      Facility Name: -> [facility]           Priority: Routine  Class: Clinic Performed    Z1   Future Order Information      Expires on:10/04/2024            Expected by:10/04/2023                   Associated Diagnoses      M54.12 Cervical radiculopathy      M50.30 DDD (degenerative disc disease), cervical      Procedure -> Epidural Injection (specify level)  Cmt: C6/7        Facility Name: -> [facility]

## 2023-10-09 NOTE — TELEPHONE ENCOUNTER
Pt notified if she start taking any antibiotics prior to procedure to please give us a call to reschedule procedure. Pt also was sent a [employer] message with instructions.

## 2023-10-09 NOTE — TELEPHONE ENCOUNTER
pt is schedule for an epidural for her bones on 7th and getting a epidural shot on the 10th with you. Pt wants to know if its safe to get them both. Do she have to wait 2 weeks in between injections?

## 2023-10-12 ENCOUNTER — CLINICAL SUPPORT (OUTPATIENT)
Dept: REHABILITATION | Facility: HOSPITAL | Age: 68
End: 2023-10-12
Payer: MEDICARE

## 2023-10-12 DIAGNOSIS — R53.1 DECREASED STRENGTH: Primary | ICD-10-CM

## 2023-10-12 DIAGNOSIS — R29.898 DECREASED ROM OF NECK: ICD-10-CM

## 2023-10-12 PROCEDURE — 97140 MANUAL THERAPY 1/> REGIONS: CPT | Mod: PN

## 2023-10-12 PROCEDURE — 97112 NEUROMUSCULAR REEDUCATION: CPT | Mod: PN

## 2023-10-12 PROCEDURE — 97110 THERAPEUTIC EXERCISES: CPT | Mod: PN

## 2023-10-13 ENCOUNTER — TELEPHONE (OUTPATIENT)
Dept: INTERNAL MEDICINE | Facility: CLINIC | Age: 68
End: 2023-10-13

## 2023-10-13 NOTE — PROGRESS NOTES
"[facility]   Physical Therapy Treatment Note      Name: Bhavna Landry  Clinic Number: [id number]    Therapy Diagnosis:   Encounter Diagnoses   Name Primary?    Decreased strength Yes    Decreased ROM of neck      Physician: Mode Cornelius MD    Visit Date: 10/12/2023  Physician Orders: PT Eval and Treat   Medical Diagnosis from Referral:   M47.812 (ICD-10-CM) - Cervical spondylosis   M47.816 (ICD-10-CM) - Lumbar spondylosis      Evaluation Date: 9/26/2023  Authorization Period Expiration: 11/21/2023  Plan of Care Expiration: 11/24/23  Visit # / Visits authorized: 4/1  FOTO: 5/5 - Next  PTA Visit:2/26     Time In:  11:05 AM  Time Out: 12:00 PM  Total Billable Time: 55 minutes (2 TE +1MT +1NMR)     Precautions: Standard, Fibromyalgia, Migraines, Schizophrenia (stable)      Subjective     Patient reports: "I felt like my neck got stuck  when I turned to the right a few days ago. It's better today".  She was compliant with home exercise program.  Response to previous treatment: reports slight increased cervical pain today  Functional change: none reported     Pain: 4/10 constant (ache / soreness)   Location: bilateral cervical      Objective      Objective Measures updated at progress report unless specified.     Treatment     Jessica received the treatments listed below:      therapeutic exercises to develop strength, ROM, and flexibility for 30 minutes including:  Cervical rotations with towel roll support : 10x5" B - cues to keep in pain free range  Cervical flexion/extension with towel roll support 10x5"ea - cues to keep in pain free range  -Supine wand flexion 2 x 10 1#  -Supine chest press 2 x 10 1#  -Supine cervical isometric 5" x 10 ea - therapist hold  -Seated cervical snags 3" x 10ea   -Rows: 2x15 green theraband  -Lat pull downs: 3x10 green theraband     manual therapy techniques: Joint mobilizations and Soft tissue Mobilization were applied to the: B UT for 10 minutes, " "including:  -STM B UT  -STM cervical / upper thoracic paraspinals  -Cervical traction  -Cervical sideglide Bilateral to mid-cervical spine, grade II-III    neuromuscular re-education activities to improve: Balance, Kinesthetic, Sense, and Proprioception for 15 minutes. The following activities were included:  -Chin tucks: 15x5''  -Chin tuck + rotation 5" x 5ea  -Scapular retractions: 15x5'' - cues to keep in pain free range    therapeutic activities to improve functional performance for 00  minutes, including:    Patient Education and Home Exercises       Education provided:   - cont HEP  -manual therapy expectations    Written Home Exercises Provided: Patient instructed to cont prior HEP. Exercises were reviewed and Jessica was able to demonstrate them prior to the end of the session.  Jessica demonstrated fair  understanding of the education provided. See Electronic Medical Record under Patient Instructions for exercises provided during therapy sessions    Assessment     Improved pain levels today, progressing in cervical AROM/PROM, no signs of radiculopathy, and improving upper body strength. Continue with cervical mobility training with overpressure. Cervical rotation improved within session and she was encouraged to continue home exercise program including rotations for good carryover.      Jessica Is progressing well towards her goals.   Patient prognosis is Fair.     Patient will continue to benefit from skilled outpatient physical therapy to address the deficits listed in the problem list box on initial evaluation, provide pt/family education and to maximize pt's level of independence in the home and community environment.     Patient's spiritual, cultural and educational needs considered and pt agreeable to plan of care and goals.     Anticipated barriers to physical therapy: RA, comorbidities, cervical scoliosis        GOALS: Short Term Goals: 4 weeks  1. Pt will demo good deep neck flexor strength to improve " cervical lordosis and stabilization.  2. Increase cervical rotation ROM by 5-10 degrees Bilaterally in order to perform ADLs with decreased difficulty.  3. Pt will demo good sitting and standing posture for improved spine health and decreased neck pain.  4. Pt to tolerate HEP to improve ROM and independence with ADL's.     Long Term Goals: 8 weeks  1. Report decreased neck pain </=2/10 to increase tolerance for ADLs.  2. Increase cervical AROM to WFL with min neck pain for increased functional mobility.  3. Increased strength in B shoulders/scapular stabilizers to >/= 4/5 MMT grade to increase tolerance for ADL and work activities.  4. Pt will report at </= TBD% impaired on FOTO neck score for neck pain disability to demonstrate decrease in disability and improvement in neck pain.      Plan     Cont to advance PT within tolerance.    Cal Arredondo, PT

## 2023-10-13 NOTE — TELEPHONE ENCOUNTER
----- Message from Nahomy Clemons sent at 10/12/2023 12:59 PM CDT -----  Contact: [phone]  1MEDICALADVICE     Patient is calling for Medical Advice regarding: pt states the medication methocarbamoL (ROBAXIN) 500 MG Tab does not work anymore and states she has had cramps for for days         Pharmacy name and phone#:   [id number] - [place]  [address] AT [facility] [address]  [address] [address]  Phone: [phone] Fax: [phone]       Would like response via [employer]:  call back     Comments:

## 2023-10-13 NOTE — TELEPHONE ENCOUNTER
Patient states she has cramps in her buttocks and hands. She states the robaxin gives no relief would like a different medication sent in.

## 2023-10-16 RX ORDER — BACLOFEN 20 MG/1
TABLET ORAL
Qty: 90 TABLET | Refills: 2 | Status: SHIPPED | OUTPATIENT
Start: 2023-10-16 | End: 2023-10-17 | Stop reason: SDUPTHER

## 2023-10-17 ENCOUNTER — CLINICAL SUPPORT (OUTPATIENT)
Dept: REHABILITATION | Facility: HOSPITAL | Age: 68
End: 2023-10-17
Payer: MEDICARE

## 2023-10-17 DIAGNOSIS — R53.1 DECREASED STRENGTH: Primary | ICD-10-CM

## 2023-10-17 DIAGNOSIS — R29.898 DECREASED ROM OF NECK: ICD-10-CM

## 2023-10-17 PROCEDURE — 97110 THERAPEUTIC EXERCISES: CPT | Mod: PN,CQ

## 2023-10-17 PROCEDURE — 97140 MANUAL THERAPY 1/> REGIONS: CPT | Mod: PN,CQ

## 2023-10-17 RX ORDER — BACLOFEN 20 MG/1
TABLET ORAL
Qty: 90 TABLET | Refills: 2 | Status: SHIPPED | OUTPATIENT
Start: 2023-10-17 | End: 2024-03-18 | Stop reason: SDUPTHER

## 2023-10-17 NOTE — TELEPHONE ENCOUNTER
----- Message from Brenton Fields sent at 10/17/2023  8:02 AM CDT -----  Contact: Self [phone]  Per pt stated call last week in regards to a relaxer and still awaiting a call.    Please call and advise

## 2023-10-17 NOTE — PROGRESS NOTES
"[facility]   Physical Therapy Treatment Note      Name: Bhavna Landry  Clinic Number: [id number]    Therapy Diagnosis:   Encounter Diagnoses   Name Primary?    Decreased strength Yes    Decreased ROM of neck      Physician: Mode Cornelius MD    Visit Date: 10/17/2023  Physician Orders: PT Eval and Treat   Medical Diagnosis from Referral:   M47.812 (ICD-10-CM) - Cervical spondylosis   M47.816 (ICD-10-CM) - Lumbar spondylosis      Evaluation Date: 9/26/2023  Authorization Period Expiration: 11/21/2023  Plan of Care Expiration: 11/24/23  Visit # / Visits authorized: 5/1  FOTO: 5/5 - Next  PTA Visit:1/6     Time In:  10:00 AM  Time Out: 11:00 aM  Total Billable Time: 30 minutes (1 TE +1MT)     Precautions: Standard, Fibromyalgia, Migraines, Schizophrenia (stable)      Subjective     Patient reports: "I felt like my neck got stuck  when I turned to the right a few days ago. It's better today".  She was compliant with home exercise program.  Response to previous treatment: reports slight increased cervical pain today  Functional change: none reported     Pain: 4/10 constant (ache / soreness)   Location: bilateral cervical      Objective      Objective Measures updated at progress report unless specified.     Treatment     Jessica received the treatments listed below:      therapeutic exercises to develop strength, ROM, and flexibility for 15 1;1 minutes, remaining minute supervised including:  Cervical rotations with towel roll support : 10x5" B - cues to keep in pain free range  Cervical flexion/extension with towel roll support 10x5"ea - cues to keep in pain free range  -Supine wand flexion 2 x 10 1#  -Supine chest press 2 x 10 1#  -Supine cervical isometric 5" x 10 ea - therapist hold  -Seated cervical snags 3" x 10ea   -Rows: 2x15 green theraband  -Lat pull downs: 3x10 green theraband     manual therapy techniques: Joint mobilizations and Soft tissue Mobilization were applied to the: B UT " "for 15 minutes, including:  -STM B UT  -STM cervical / upper thoracic paraspinals  -Cervical traction  -Cervical sideglide Bilateral to mid-cervical spine, grade II-III    neuromuscular re-education activities to improve: Balance, Kinesthetic, Sense, and Proprioception for 15 supervised minutes. The following activities were included:  -Chin tucks: 15x5''  -Chin tuck + rotation 5" x 5ea  -Scapular retractions: 15x5'' - cues to keep in pain free range    therapeutic activities to improve functional performance for 00  minutes, including:    Patient Education and Home Exercises       Education provided:   - cont HEP  -manual therapy expectations    Written Home Exercises Provided: Patient instructed to cont prior HEP. Exercises were reviewed and Jessica was able to demonstrate them prior to the end of the session.  Jessica demonstrated fair  understanding of the education provided. See Electronic Medical Record under Patient Instructions for exercises provided during therapy sessions    Assessment     Improved pain levels today, progressing in cervical AROM/PROM, no signs of radiculopathy, and improving upper body strength. Pt responded well to manual therapy coupled with cervical ROM / stability therex.   Jessica Is progressing well towards her goals.   Patient prognosis is Fair.     Patient will continue to benefit from skilled outpatient physical therapy to address the deficits listed in the problem list box on initial evaluation, provide pt/family education and to maximize pt's level of independence in the home and community environment.     Patient's spiritual, cultural and educational needs considered and pt agreeable to plan of care and goals.     Anticipated barriers to physical therapy: RA, comorbidities, cervical scoliosis        GOALS: Short Term Goals: 4 weeks  1. Pt will demo good deep neck flexor strength to improve cervical lordosis and stabilization.  2. Increase cervical rotation ROM by 5-10 degrees " Bilaterally in order to perform ADLs with decreased difficulty.  3. Pt will demo good sitting and standing posture for improved spine health and decreased neck pain.  4. Pt to tolerate HEP to improve ROM and independence with ADL's.     Long Term Goals: 8 weeks  1. Report decreased neck pain </=2/10 to increase tolerance for ADLs.  2. Increase cervical AROM to WFL with min neck pain for increased functional mobility.  3. Increased strength in B shoulders/scapular stabilizers to >/= 4/5 MMT grade to increase tolerance for ADL and work activities.  4. Pt will report at </= TBD% impaired on FOTO neck score for neck pain disability to demonstrate decrease in disability and improvement in neck pain.      Plan     Cont to advance PT within tolerance.    Vern Uribe, PTA

## 2023-10-18 ENCOUNTER — OFFICE VISIT (OUTPATIENT)
Dept: DERMATOLOGY | Facility: CLINIC | Age: 68
End: 2023-10-18
Payer: MEDICARE

## 2023-10-18 VITALS — BODY MASS INDEX: 17.92 KG/M2 | WEIGHT: 98 LBS

## 2023-10-18 DIAGNOSIS — L57.0 MULTIPLE ACTINIC KERATOSES: Primary | ICD-10-CM

## 2023-10-18 DIAGNOSIS — L82.1 SEBORRHEIC KERATOSES: ICD-10-CM

## 2023-10-18 DIAGNOSIS — L81.4 LENTIGINES: ICD-10-CM

## 2023-10-18 DIAGNOSIS — Z85.828 HISTORY OF SKIN CANCER: ICD-10-CM

## 2023-10-18 PROCEDURE — 1159F MED LIST DOCD IN RCRD: CPT | Mod: CPTII,S$GLB,, | Performed by: DERMATOLOGY

## 2023-10-18 PROCEDURE — 99999 PR PBB SHADOW E&M-EST. PATIENT-LVL IV: CPT | Mod: PBBFAC,,, | Performed by: DERMATOLOGY

## 2023-10-18 PROCEDURE — 4010F ACE/ARB THERAPY RXD/TAKEN: CPT | Mod: CPTII,S$GLB,, | Performed by: DERMATOLOGY

## 2023-10-18 PROCEDURE — 1160F RVW MEDS BY RX/DR IN RCRD: CPT | Mod: CPTII,S$GLB,, | Performed by: DERMATOLOGY

## 2023-10-18 PROCEDURE — 4010F PR ACE/ARB THEARPY RXD/TAKEN: ICD-10-PCS | Mod: CPTII,S$GLB,, | Performed by: DERMATOLOGY

## 2023-10-18 PROCEDURE — 1101F PT FALLS ASSESS-DOCD LE1/YR: CPT | Mod: CPTII,S$GLB,, | Performed by: DERMATOLOGY

## 2023-10-18 PROCEDURE — 99999 PR PBB SHADOW E&M-EST. PATIENT-LVL IV: ICD-10-PCS | Mod: PBBFAC,,, | Performed by: DERMATOLOGY

## 2023-10-18 PROCEDURE — 3008F PR BODY MASS INDEX (BMI) DOCUMENTED: ICD-10-PCS | Mod: CPTII,S$GLB,, | Performed by: DERMATOLOGY

## 2023-10-18 PROCEDURE — 1101F PR PT FALLS ASSESS DOC 0-1 FALLS W/OUT INJ PAST YR: ICD-10-PCS | Mod: CPTII,S$GLB,, | Performed by: DERMATOLOGY

## 2023-10-18 PROCEDURE — 3288F PR FALLS RISK ASSESSMENT DOCUMENTED: ICD-10-PCS | Mod: CPTII,S$GLB,, | Performed by: DERMATOLOGY

## 2023-10-18 PROCEDURE — 99214 OFFICE O/P EST MOD 30 MIN: CPT | Mod: S$GLB,,, | Performed by: DERMATOLOGY

## 2023-10-18 PROCEDURE — 3288F FALL RISK ASSESSMENT DOCD: CPT | Mod: CPTII,S$GLB,, | Performed by: DERMATOLOGY

## 2023-10-18 PROCEDURE — 1159F PR MEDICATION LIST DOCUMENTED IN MEDICAL RECORD: ICD-10-PCS | Mod: CPTII,S$GLB,, | Performed by: DERMATOLOGY

## 2023-10-18 PROCEDURE — 99214 PR OFFICE/OUTPT VISIT, EST, LEVL IV, 30-39 MIN: ICD-10-PCS | Mod: S$GLB,,, | Performed by: DERMATOLOGY

## 2023-10-18 PROCEDURE — 1160F PR REVIEW ALL MEDS BY PRESCRIBER/CLIN PHARMACIST DOCUMENTED: ICD-10-PCS | Mod: CPTII,S$GLB,, | Performed by: DERMATOLOGY

## 2023-10-18 PROCEDURE — 1126F AMNT PAIN NOTED NONE PRSNT: CPT | Mod: CPTII,S$GLB,, | Performed by: DERMATOLOGY

## 2023-10-18 PROCEDURE — 3008F BODY MASS INDEX DOCD: CPT | Mod: CPTII,S$GLB,, | Performed by: DERMATOLOGY

## 2023-10-18 PROCEDURE — 1126F PR PAIN SEVERITY QUANTIFIED, NO PAIN PRESENT: ICD-10-PCS | Mod: CPTII,S$GLB,, | Performed by: DERMATOLOGY

## 2023-10-18 RX ORDER — FLUOROURACIL 50 MG/G
CREAM TOPICAL
Qty: 40 G | Refills: 3 | Status: SHIPPED | OUTPATIENT
Start: 2023-10-18 | End: 2024-02-23

## 2023-10-18 NOTE — PROGRESS NOTES
Subjective:      Patient ID:  Bhavna Landry is a 68 y.o. female who presents for   Chief Complaint   Patient presents with    Follow-up     UBSE     The previous actinic keratoses resolved on her face and arm, she has new spots above left eyebrow.     Follow-up - Follow-up  Affected locations: face, chest, torso, back and abdomen  Signs / symptoms: asymptomatic      Review of Systems   Constitutional:  Negative for fever, chills, weight loss, weight gain, fatigue, night sweats and malaise.   Skin:  Negative for daily sunscreen use, activity-related sunscreen use and wears hat.   Hematologic/Lymphatic: Bruises/bleeds easily.       Objective:   Physical Exam   Constitutional: She appears well-developed and well-nourished. No distress.   Neurological: She is alert and oriented to person, place, and time. She is not disoriented.   Psychiatric: She has a normal mood and affect.   Skin:   Areas Examined (abnormalities noted in diagram):   Scalp / Hair Palpated and Inspected  Head / Face Inspection Performed  Neck Inspection Performed  Chest / Axilla Inspection Performed  Abdomen Inspection Performed  Back Inspection Performed  RUE Inspected  LUE Inspection Performed  Nails and Digits Inspection Performed                 Diagram Legend     Erythematous scaling macule/papule c/w actinic keratosis       Vascular papule c/w angioma      Pigmented verrucoid papule/plaque c/w seborrheic keratosis      Yellow umbilicated papule c/w sebaceous hyperplasia      Irregularly shaped tan macule c/w lentigo     1-2 mm smooth white papules consistent with Milia      Movable subcutaneous cyst with punctum c/w epidermal inclusion cyst      Subcutaneous movable cyst c/w pilar cyst      Firm pink to brown papule c/w dermatofibroma      Pedunculated fleshy papule(s) c/w skin tag(s)      Evenly pigmented macule c/w junctional nevus     Mildly variegated pigmented, slightly irregular-bordered macule c/w mildly atypical nevus      Flesh  "colored to evenly pigmented papule c/w intradermal nevus       Pink pearly papule/plaque c/w basal cell carcinoma      Erythematous hyperkeratotic cursted plaque c/w SCC      Surgical scar with no sign of skin cancer recurrence      Open and closed comedones      Inflammatory papules and pustules      Verrucoid papule consistent consistent with wart     Erythematous eczematous patches and plaques     Dystrophic onycholytic nail with subungual debris c/w onychomycosis     Umbilicated papule    Erythematous-base heme-crusted tan verrucoid plaque consistent with inflamed seborrheic keratosis     Erythematous Silvery Scaling Plaque c/w Psoriasis     See annotation      Assessment / Plan:        Multiple actinic keratoses  -     fluorouraciL (EFUDEX) 5 % cream; Use hs for 2 weeks  Dispense: 40 g; Refill: 3 use above left eyebrow    Seborrheic keratoses  Seborrheic keratosis scattered, told benign no treatment needed.  Brochure provided.        Lentigines  The "ABCD" rules to observe pigmented lesions were reviewed.      History of skin cancers  Area(s) of previous NMSC evaluated with no signs of recurrence.    Upper body skin examination performed today including at least 6 points as noted in physical examination. No lesions suspicious for malignancy noted.    Recommend daily sun protection/avoidance and use of at least SPF 30, broad spectrum sunscreen (OTC drug).                Follow up in about 6 months (around 4/18/2024).  "

## 2023-10-19 ENCOUNTER — CLINICAL SUPPORT (OUTPATIENT)
Dept: REHABILITATION | Facility: HOSPITAL | Age: 68
End: 2023-10-19
Payer: MEDICARE

## 2023-10-19 DIAGNOSIS — R53.1 DECREASED STRENGTH: Primary | ICD-10-CM

## 2023-10-19 DIAGNOSIS — R29.898 DECREASED ROM OF NECK: ICD-10-CM

## 2023-10-19 PROCEDURE — 97140 MANUAL THERAPY 1/> REGIONS: CPT | Mod: PN,CQ

## 2023-10-19 PROCEDURE — 97112 NEUROMUSCULAR REEDUCATION: CPT | Mod: PN,CQ

## 2023-10-19 NOTE — PROGRESS NOTES
"[facility]   Physical Therapy Treatment Note      Name: Bhavna Landry  Clinic Number: [id number]    Therapy Diagnosis:   Encounter Diagnoses   Name Primary?    Decreased strength Yes    Decreased ROM of neck      Physician: Mode Cornelius MD    Visit Date: 10/19/2023  Physician Orders: PT Eval and Treat   Medical Diagnosis from Referral:   M47.812 (ICD-10-CM) - Cervical spondylosis   M47.816 (ICD-10-CM) - Lumbar spondylosis      Evaluation Date: 9/26/2023  Authorization Period Expiration: 11/21/2023  Plan of Care Expiration: 11/24/23  Visit # / Visits authorized: 6/1  FOTO: 6/5 - Next  PTA Visit:2/6     Time In: 1:00 PM  Time Out: 2:00 PM  Total Billable Time: 30 minutes (1 NMR +1MT)     Precautions: Standard, Fibromyalgia, Migraines, Schizophrenia (stable)      Subjective     Patient reports: "I'm really hurting today". Pt agreeable to PT session   She was compliant with home exercise program.  Response to previous treatment: reports slight increased cervical pain today  Functional change: none reported     Pain: 7/10 constant (ache / soreness), reduced to 2/10 pain at end of session  Location: bilateral cervical / L UT the most.     Objective      Objective Measures updated at progress report unless specified.     Treatment     Jessica received the treatments listed below:      therapeutic exercises to develop strength, ROM, and flexibility for supervised minutes, remaining minute supervised including:  Cervical rotations with towel roll support : 10x5" B - cues to keep in pain free range  Cervical flexion/extension with towel roll support 10x5"ea - cues to keep in pain free range  -Supine wand flexion 2 x 10 1#  -Supine chest press 2 x 10 1#  -Supine cervical isometric 5" x 10 ea - therapist hold  -Seated cervical snags 3" x 10ea   -Rows: 2x15 green theraband  -Lat pull downs: 3x10 green theraband     manual therapy techniques: Joint mobilizations and Soft tissue Mobilization were " "applied to the: B UT for 15 minutes, including:  -STM B UT  -STM cervical / upper thoracic paraspinals  -Cervical traction  -Cervical sideglide Bilateral to mid-cervical spine, grade II-III    neuromuscular re-education activities to improve: Balance, Kinesthetic, Sense, and Proprioception for 15 supervised minutes. The following activities were included:  -Chin tucks: 15x5''  -Chin tuck + rotation 5" x 5ea  -Scapular retractions: 15x5'' - cues to keep in pain free range    therapeutic activities to improve functional performance for 00  minutes, including:  Moist heat pack cervical  x 10 min at end of session in supine.  Patient Education and Home Exercises       Education provided:   - cont HEP  -manual therapy expectations    Written Home Exercises Provided: Patient instructed to cont prior HEP. Exercises were reviewed and Jessica was able to demonstrate them prior to the end of the session.  Jessica demonstrated fair  understanding of the education provided. See Electronic Medical Record under Patient Instructions for exercises provided during therapy sessions    Assessment     Improved pain levels today, progressing in cervical AROM/PROM, no signs of radiculopathy, and improving upper body strength. Pt responded well to manual therapy coupled with cervical ROM / stability therex.   Jessica Is progressing well towards her goals.   Patient prognosis is Fair.     Patient will continue to benefit from skilled outpatient physical therapy to address the deficits listed in the problem list box on initial evaluation, provide pt/family education and to maximize pt's level of independence in the home and community environment.     Patient's spiritual, cultural and educational needs considered and pt agreeable to plan of care and goals.     Anticipated barriers to physical therapy: RA, comorbidities, cervical scoliosis        GOALS: Short Term Goals: 4 weeks  1. Pt will demo good deep neck flexor strength to improve cervical " lordosis and stabilization.  2. Increase cervical rotation ROM by 5-10 degrees Bilaterally in order to perform ADLs with decreased difficulty.  3. Pt will demo good sitting and standing posture for improved spine health and decreased neck pain.  4. Pt to tolerate HEP to improve ROM and independence with ADL's.     Long Term Goals: 8 weeks  1. Report decreased neck pain </=2/10 to increase tolerance for ADLs.  2. Increase cervical AROM to WFL with min neck pain for increased functional mobility.  3. Increased strength in B shoulders/scapular stabilizers to >/= 4/5 MMT grade to increase tolerance for ADL and work activities.  4. Pt will report at </= TBD% impaired on FOTO neck score for neck pain disability to demonstrate decrease in disability and improvement in neck pain.      Plan     Cont to advance PT within tolerance.    Vern Uribe, PTA

## 2023-10-24 ENCOUNTER — CLINICAL SUPPORT (OUTPATIENT)
Dept: REHABILITATION | Facility: HOSPITAL | Age: 68
End: 2023-10-24
Payer: MEDICARE

## 2023-10-24 DIAGNOSIS — R53.1 DECREASED STRENGTH: Primary | ICD-10-CM

## 2023-10-24 DIAGNOSIS — R29.898 DECREASED ROM OF NECK: ICD-10-CM

## 2023-10-24 PROCEDURE — 97112 NEUROMUSCULAR REEDUCATION: CPT | Mod: PN,CQ

## 2023-10-24 PROCEDURE — 97110 THERAPEUTIC EXERCISES: CPT | Mod: PN,CQ

## 2023-10-24 PROCEDURE — 97140 MANUAL THERAPY 1/> REGIONS: CPT | Mod: PN,CQ

## 2023-10-24 NOTE — PROGRESS NOTES
"[facility]   Physical Therapy Treatment Note      Name: Bhavna Landry  Clinic Number: [id number]    Therapy Diagnosis:   Encounter Diagnoses   Name Primary?    Decreased strength Yes    Decreased ROM of neck      Physician: Mode Cornelius MD    Visit Date: 10/24/2023  Physician Orders: PT Eval and Treat   Medical Diagnosis from Referral:   M47.812 (ICD-10-CM) - Cervical spondylosis   M47.816 (ICD-10-CM) - Lumbar spondylosis      Evaluation Date: 9/26/2023  Authorization Period Expiration: 11/21/2023  Plan of Care Expiration: 11/24/23  Visit # / Visits authorized: 7/23  FOTO: 8/10   PTA Visit: 3/6     Time In: 1:00 PM  Time Out: 2:00 PM  Total Billable Time: 30 minutes (1 NMR +1MT + 2 TE)     Precautions: Standard, Fibromyalgia, Migraines, Schizophrenia (stable)      Subjective     Patient reports: Still having a lot of pain in left neck and base of skull and into left shoulder.   She was compliant with home exercise program.  Response to previous treatment: reports slight increased cervical pain today  Functional change: none reported     Pain: 6/10 constant (ache / soreness), reduced to 2/10 pain at end of session  Location: bilateral cervical / L UT the most.     Objective      Objective Measures updated at progress report unless specified.     Treatment     Jessica received the treatments listed below:      therapeutic exercises to develop strength, ROM, and flexibility for 25 minutes, including:  Cervical rotations with towel roll support : 10x5" B - cues to keep in pain free range  Cervical flexion/extension with towel roll support 10x5"ea - cues to keep in pain free range  -Supine wand flexion 3 x 10 1#  -Supine chest press 3 x 10 1#  -Supine cervical isometric 5" x 10 ea - therapist hold  -Seated cervical snags 3" x 10ea   -Rows: 2x15 green theraband  -Lat pull downs: 3x10 green theraband     manual therapy techniques: Joint mobilizations and Soft tissue Mobilization were applied " "to the: B UT for 15 minutes, including:  -STM B UT  -STM cervical / upper thoracic paraspinals  -Cervical traction  -Cervical sideglide Bilateral to mid-cervical spine, grade II-III    neuromuscular re-education activities to improve: Balance, Kinesthetic, Sense, and Proprioception for 15 supervised minutes. The following activities were included:  -Chin tucks: 15x5'' - moderate cuing  -Chin tuck + rotation 5" x 5ea  -Scapular retractions: 20x5'' - cues to keep in pain free range    therapeutic activities to improve functional performance for 00  minutes, including:  Moist heat pack cervical  x 10 min at end of session in supine.  Patient Education and Home Exercises       Education provided:   - cont HEP  -manual therapy expectations    Written Home Exercises Provided: Patient instructed to cont prior HEP. Exercises were reviewed and Jessica was able to demonstrate them prior to the end of the session.  Jessica demonstrated fair  understanding of the education provided. See Electronic Medical Record under Patient Instructions for exercises provided during therapy sessions    Assessment     Patient presents with 6/10 pain primarily on the left side of her neck. Needs moderate cuing for appropriate form of exercise. Overall tolerated well without increased pain from baseline. Will continue to progress as appropriate.   Jessica Is progressing well towards her goals.   Patient prognosis is Fair.     Patient will continue to benefit from skilled outpatient physical therapy to address the deficits listed in the problem list box on initial evaluation, provide pt/family education and to maximize pt's level of independence in the home and community environment.     Patient's spiritual, cultural and educational needs considered and pt agreeable to plan of care and goals.     Anticipated barriers to physical therapy: RA, comorbidities, cervical scoliosis        GOALS: Short Term Goals: 4 weeks  1. Pt will demo good deep neck " flexor strength to improve cervical lordosis and stabilization.  2. Increase cervical rotation ROM by 5-10 degrees Bilaterally in order to perform ADLs with decreased difficulty.  3. Pt will demo good sitting and standing posture for improved spine health and decreased neck pain.  4. Pt to tolerate HEP to improve ROM and independence with ADL's.     Long Term Goals: 8 weeks  1. Report decreased neck pain </=2/10 to increase tolerance for ADLs.  2. Increase cervical AROM to WFL with min neck pain for increased functional mobility.  3. Increased strength in B shoulders/scapular stabilizers to >/= 4/5 MMT grade to increase tolerance for ADL and work activities.  4. Pt will report at </= TBD% impaired on FOTO neck score for neck pain disability to demonstrate decrease in disability and improvement in neck pain.      Plan     Cont to advance PT within tolerance.    Gisela Mcgrath, PTA

## 2023-10-29 DIAGNOSIS — I10 ESSENTIAL HYPERTENSION: ICD-10-CM

## 2023-10-29 NOTE — TELEPHONE ENCOUNTER
No care due was identified.  [facility] Embedded Care Due Messages. Reference number: [id number].   10/29/2023 4:43:32 PM CDT

## 2023-10-30 RX ORDER — LISINOPRIL 5 MG/1
TABLET ORAL
Qty: 90 TABLET | Refills: 3 | OUTPATIENT
Start: 2023-10-30

## 2023-10-30 NOTE — TELEPHONE ENCOUNTER
Refill Routing Note   Medication(s) are not appropriate for processing by [facility] for the following reason(s):      Non-participating provider    [facility] action(s):  Route Care Due:  None identified     Medication Therapy Plan: Patient's HTN is being monitored by digital medicine; Route      Appointments  past 12m or future 3m with PCP    Date Provider   Last Visit   6/6/2023 Sadaf Vargas MD   Next Visit   Visit date not found Sadaf Vargas MD   ED visits in past 90 days: 0        Note composed:3:37 AM 10/30/2023

## 2023-10-31 ENCOUNTER — CLINICAL SUPPORT (OUTPATIENT)
Dept: REHABILITATION | Facility: HOSPITAL | Age: 68
End: 2023-10-31
Payer: MEDICARE

## 2023-10-31 DIAGNOSIS — R29.898 DECREASED ROM OF NECK: ICD-10-CM

## 2023-10-31 DIAGNOSIS — R53.1 DECREASED STRENGTH: Primary | ICD-10-CM

## 2023-10-31 PROCEDURE — 97140 MANUAL THERAPY 1/> REGIONS: CPT | Mod: PN

## 2023-10-31 PROCEDURE — 97110 THERAPEUTIC EXERCISES: CPT | Mod: PN

## 2023-10-31 PROCEDURE — 97112 NEUROMUSCULAR REEDUCATION: CPT | Mod: PN

## 2023-10-31 PROCEDURE — 97530 THERAPEUTIC ACTIVITIES: CPT | Mod: PN

## 2023-10-31 NOTE — PROGRESS NOTES
"[facility]   Physical Therapy Treatment Note      Name: Bhavna Landry  Clinic Number: [id number]    Therapy Diagnosis:   Encounter Diagnoses   Name Primary?    Decreased strength Yes    Decreased ROM of neck      Physician: Mode Cornelius MD    Visit Date: 10/31/2023  Physician Orders: PT Eval and Treat   Medical Diagnosis from Referral:   M47.812 (ICD-10-CM) - Cervical spondylosis   M47.816 (ICD-10-CM) - Lumbar spondylosis      Evaluation Date: 9/26/2023  Authorization Period Expiration: 11/21/2023  Plan of Care Expiration: 11/24/23  Visit # / Visits authorized: 8/23  FOTO: 9/10 - Next   PTA Visit: 0/6     Time In: 11:00 AM  Time Out: 11:55 AM  Total Billable Time: 55 minutes (1 NMR +1MT + 1 TA + 1 TE)     Precautions: Standard, Fibromyalgia, Migraines, Schizophrenia (stable)      Subjective     Patient reports: was a little sore after last visit at the base of her head and her left upper trap area. Doing better today.  She was compliant with home exercise program.  Response to previous treatment: reports slight increased cervical pain today  Functional change: none reported     Pain: 6/10 constant (ache / soreness)  Location: bilateral cervical / L UT the most.     Objective      Objective Measures updated at progress report unless specified.     Treatment     Jessica received the treatments listed below:      therapeutic exercises to develop strength, ROM, and flexibility for 15 minutes, including:  -Cervical rotations with towel roll support : 10x5" B - cues to keep in pain free range  -Cervical flexion/extension with towel roll support 10x5"ea - cues to keep in pain free range  -Supine wand flexion 3 x 10 1# - NT  -Supine cervical isometric 5" x 10 ea - therapist hold - NT  -Seated cervical snags 3" x 10ea  - NT, resume next  -Lat pull downs: 3x10 green theraband - NT  -Standing chicken wings on wall: 12x5'' holds    manual therapy techniques: Joint mobilizations and Soft tissue " Mobilization were applied to the: B UT for 15 minutes, including:  -Cervical traction  -Cervical sideglide Bilateral to mid-cervical spine, grade II-III focusing on left side  -Left mid-cervical opening mobilizations, grade II-III  - PAs to CTJ, grade II-III    neuromuscular re-education activities to improve: Balance, Kinesthetic, Sense, and Proprioception for 15 minutes. The following activities were included:  -Chin tucks: 20x5'' - moderate cuing  -Chin tuck + rotation: 10x Bilateral to end range    therapeutic activities to improve functional performance for 10 minutes, including:  Wall slides: 20x5'' holds  -Rows: 2x15 green theraband    Moist heat pack cervical  x 00 min at end of session in supine.    Patient Education and Home Exercises       Education provided:   - cont HEP  -manual therapy expectations    Written Home Exercises Provided: Patient instructed to cont prior HEP. Exercises were reviewed and Jessica was able to demonstrate them prior to the end of the session.  Jessica demonstrated fair  understanding of the education provided. See Electronic Medical Record under Patient Instructions for exercises provided during therapy sessions    Assessment     Bhavna is a 68 y.o. female referred to outpatient Physical Therapy with a medical diagnosis of Cervical spondylosis and lumbar spondylosis. Pt limited in left cervical rotation and sidebending with left sided mid-cervical facet hypomobility. This improved significantly after manual doubling her PROM with decreased pain. Added on some more scapular upward rotation activities for her today with good response.       Jessica Is progressing well towards her goals.   Patient prognosis is Fair.     Patient will continue to benefit from skilled outpatient physical therapy to address the deficits listed in the problem list box on initial evaluation, provide pt/family education and to maximize pt's level of independence in the home and community environment.      Patient's spiritual, cultural and educational needs considered and pt agreeable to plan of care and goals.     Anticipated barriers to physical therapy: RA, comorbidities, cervical scoliosis        GOALS: Short Term Goals: 4 weeks  1. Pt will demo good deep neck flexor strength to improve cervical lordosis and stabilization.  2. Increase cervical rotation ROM by 5-10 degrees Bilaterally in order to perform ADLs with decreased difficulty.  3. Pt will demo good sitting and standing posture for improved spine health and decreased neck pain.  4. Pt to tolerate HEP to improve ROM and independence with ADL's.     Long Term Goals: 8 weeks  1. Report decreased neck pain </=2/10 to increase tolerance for ADLs.  2. Increase cervical AROM to WFL with min neck pain for increased functional mobility.  3. Increased strength in B shoulders/scapular stabilizers to >/= 4/5 MMT grade to increase tolerance for ADL and work activities.  4. Pt will report at </= TBD% impaired on FOTO neck score for neck pain disability to demonstrate decrease in disability and improvement in neck pain.    Plan     Cont to advance PT within tolerance.    Cal Arredondo, PT

## 2023-11-02 ENCOUNTER — CLINICAL SUPPORT (OUTPATIENT)
Dept: REHABILITATION | Facility: HOSPITAL | Age: 68
End: 2023-11-02
Payer: MEDICARE

## 2023-11-02 DIAGNOSIS — R29.898 DECREASED ROM OF NECK: ICD-10-CM

## 2023-11-02 DIAGNOSIS — R53.1 DECREASED STRENGTH: Primary | ICD-10-CM

## 2023-11-02 PROCEDURE — 97110 THERAPEUTIC EXERCISES: CPT | Mod: PN,CQ

## 2023-11-02 PROCEDURE — 97140 MANUAL THERAPY 1/> REGIONS: CPT | Mod: PN,CQ

## 2023-11-02 PROCEDURE — 97112 NEUROMUSCULAR REEDUCATION: CPT | Mod: PN,CQ

## 2023-11-07 ENCOUNTER — INFUSION (OUTPATIENT)
Dept: INFUSION THERAPY | Facility: HOSPITAL | Age: 68
End: 2023-11-07
Payer: MEDICARE

## 2023-11-07 ENCOUNTER — OFFICE VISIT (OUTPATIENT)
Dept: HEMATOLOGY/ONCOLOGY | Facility: CLINIC | Age: 68
End: 2023-11-07
Payer: MEDICARE

## 2023-11-07 VITALS
HEIGHT: 62 IN | BODY MASS INDEX: 18.75 KG/M2 | WEIGHT: 101.88 LBS | DIASTOLIC BLOOD PRESSURE: 71 MMHG | SYSTOLIC BLOOD PRESSURE: 147 MMHG | OXYGEN SATURATION: 99 % | HEART RATE: 77 BPM | RESPIRATION RATE: 18 BRPM

## 2023-11-07 VITALS
SYSTOLIC BLOOD PRESSURE: 132 MMHG | RESPIRATION RATE: 18 BRPM | TEMPERATURE: 98 F | HEART RATE: 67 BPM | BODY MASS INDEX: 18.75 KG/M2 | DIASTOLIC BLOOD PRESSURE: 61 MMHG | WEIGHT: 101.88 LBS | HEIGHT: 62 IN | OXYGEN SATURATION: 98 %

## 2023-11-07 DIAGNOSIS — Z17.0 CARCINOMA OF UPPER-OUTER QUADRANT OF RIGHT BREAST IN FEMALE, ESTROGEN RECEPTOR POSITIVE: Primary | ICD-10-CM

## 2023-11-07 DIAGNOSIS — K21.9 GASTROESOPHAGEAL REFLUX DISEASE, UNSPECIFIED WHETHER ESOPHAGITIS PRESENT: ICD-10-CM

## 2023-11-07 DIAGNOSIS — T45.1X5A AROMATASE INHIBITOR-ASSOCIATED ARTHRALGIA: ICD-10-CM

## 2023-11-07 DIAGNOSIS — C77.3 SECONDARY AND UNSPECIFIED MALIGNANT NEOPLASM OF AXILLA AND UPPER LIMB LYMPH NODES: ICD-10-CM

## 2023-11-07 DIAGNOSIS — C50.411 CARCINOMA OF UPPER-OUTER QUADRANT OF RIGHT BREAST IN FEMALE, ESTROGEN RECEPTOR POSITIVE: Primary | ICD-10-CM

## 2023-11-07 DIAGNOSIS — R63.4 WEIGHT LOSS: ICD-10-CM

## 2023-11-07 DIAGNOSIS — Z79.899 DRUG-INDUCED IMMUNODEFICIENCY: ICD-10-CM

## 2023-11-07 DIAGNOSIS — Z79.811 USE OF ANASTROZOLE: ICD-10-CM

## 2023-11-07 DIAGNOSIS — M85.89 OSTEOPENIA OF MULTIPLE SITES: Primary | ICD-10-CM

## 2023-11-07 DIAGNOSIS — M25.50 AROMATASE INHIBITOR-ASSOCIATED ARTHRALGIA: ICD-10-CM

## 2023-11-07 DIAGNOSIS — Z12.31 ENCOUNTER FOR SCREENING MAMMOGRAM FOR MALIGNANT NEOPLASM OF BREAST: ICD-10-CM

## 2023-11-07 DIAGNOSIS — M85.89 OSTEOPENIA OF MULTIPLE SITES: ICD-10-CM

## 2023-11-07 DIAGNOSIS — D84.821 DRUG-INDUCED IMMUNODEFICIENCY: ICD-10-CM

## 2023-11-07 PROCEDURE — 4010F ACE/ARB THERAPY RXD/TAKEN: CPT | Mod: CPTII,S$GLB,, | Performed by: INTERNAL MEDICINE

## 2023-11-07 PROCEDURE — 1101F PR PT FALLS ASSESS DOC 0-1 FALLS W/OUT INJ PAST YR: ICD-10-PCS | Mod: CPTII,S$GLB,, | Performed by: INTERNAL MEDICINE

## 2023-11-07 PROCEDURE — 3288F PR FALLS RISK ASSESSMENT DOCUMENTED: ICD-10-PCS | Mod: CPTII,S$GLB,, | Performed by: INTERNAL MEDICINE

## 2023-11-07 PROCEDURE — 3008F PR BODY MASS INDEX (BMI) DOCUMENTED: ICD-10-PCS | Mod: CPTII,S$GLB,, | Performed by: INTERNAL MEDICINE

## 2023-11-07 PROCEDURE — 4010F PR ACE/ARB THEARPY RXD/TAKEN: ICD-10-PCS | Mod: CPTII,S$GLB,, | Performed by: INTERNAL MEDICINE

## 2023-11-07 PROCEDURE — 1159F PR MEDICATION LIST DOCUMENTED IN MEDICAL RECORD: ICD-10-PCS | Mod: CPTII,S$GLB,, | Performed by: INTERNAL MEDICINE

## 2023-11-07 PROCEDURE — 3078F PR MOST RECENT DIASTOLIC BLOOD PRESSURE < 80 MM HG: ICD-10-PCS | Mod: CPTII,S$GLB,, | Performed by: INTERNAL MEDICINE

## 2023-11-07 PROCEDURE — 63600175 PHARM REV CODE 636 W HCPCS: Performed by: INTERNAL MEDICINE

## 2023-11-07 PROCEDURE — 99215 OFFICE O/P EST HI 40 MIN: CPT | Mod: S$GLB,,, | Performed by: INTERNAL MEDICINE

## 2023-11-07 PROCEDURE — 1125F PR PAIN SEVERITY QUANTIFIED, PAIN PRESENT: ICD-10-PCS | Mod: CPTII,S$GLB,, | Performed by: INTERNAL MEDICINE

## 2023-11-07 PROCEDURE — 1101F PT FALLS ASSESS-DOCD LE1/YR: CPT | Mod: CPTII,S$GLB,, | Performed by: INTERNAL MEDICINE

## 2023-11-07 PROCEDURE — 1125F AMNT PAIN NOTED PAIN PRSNT: CPT | Mod: CPTII,S$GLB,, | Performed by: INTERNAL MEDICINE

## 2023-11-07 PROCEDURE — 3078F DIAST BP <80 MM HG: CPT | Mod: CPTII,S$GLB,, | Performed by: INTERNAL MEDICINE

## 2023-11-07 PROCEDURE — 3008F BODY MASS INDEX DOCD: CPT | Mod: CPTII,S$GLB,, | Performed by: INTERNAL MEDICINE

## 2023-11-07 PROCEDURE — 96413 CHEMO IV INFUSION 1 HR: CPT

## 2023-11-07 PROCEDURE — 3288F FALL RISK ASSESSMENT DOCD: CPT | Mod: CPTII,S$GLB,, | Performed by: INTERNAL MEDICINE

## 2023-11-07 PROCEDURE — 96365 THER/PROPH/DIAG IV INF INIT: CPT

## 2023-11-07 PROCEDURE — 1159F MED LIST DOCD IN RCRD: CPT | Mod: CPTII,S$GLB,, | Performed by: INTERNAL MEDICINE

## 2023-11-07 PROCEDURE — 99999 PR PBB SHADOW E&M-EST. PATIENT-LVL V: ICD-10-PCS | Mod: PBBFAC,,, | Performed by: INTERNAL MEDICINE

## 2023-11-07 PROCEDURE — 3075F SYST BP GE 130 - 139MM HG: CPT | Mod: CPTII,S$GLB,, | Performed by: INTERNAL MEDICINE

## 2023-11-07 PROCEDURE — 99215 PR OFFICE/OUTPT VISIT, EST, LEVL V, 40-54 MIN: ICD-10-PCS | Mod: S$GLB,,, | Performed by: INTERNAL MEDICINE

## 2023-11-07 PROCEDURE — 99999 PR PBB SHADOW E&M-EST. PATIENT-LVL V: CPT | Mod: PBBFAC,,, | Performed by: INTERNAL MEDICINE

## 2023-11-07 PROCEDURE — 3075F PR MOST RECENT SYSTOLIC BLOOD PRESS GE 130-139MM HG: ICD-10-PCS | Mod: CPTII,S$GLB,, | Performed by: INTERNAL MEDICINE

## 2023-11-07 PROCEDURE — 25000003 PHARM REV CODE 250: Performed by: INTERNAL MEDICINE

## 2023-11-07 RX ORDER — SODIUM CHLORIDE 0.9 % (FLUSH) 0.9 %
10 SYRINGE (ML) INJECTION
Status: DISCONTINUED | OUTPATIENT
Start: 2023-11-07 | End: 2023-11-07 | Stop reason: HOSPADM

## 2023-11-07 RX ORDER — HEPARIN 100 UNIT/ML
500 SYRINGE INTRAVENOUS
Status: DISCONTINUED | OUTPATIENT
Start: 2023-11-07 | End: 2023-11-07 | Stop reason: HOSPADM

## 2023-11-07 RX ORDER — EPINEPHRINE 0.3 MG/.3ML
0.3 INJECTION SUBCUTANEOUS ONCE AS NEEDED
Status: CANCELLED | OUTPATIENT
Start: 2023-11-07

## 2023-11-07 RX ORDER — HEPARIN 100 UNIT/ML
500 SYRINGE INTRAVENOUS
OUTPATIENT
Start: 2023-11-21

## 2023-11-07 RX ORDER — EXEMESTANE 25 MG/1
25 TABLET ORAL DAILY
Qty: 30 TABLET | Refills: 11 | Status: SHIPPED | OUTPATIENT
Start: 2023-11-07

## 2023-11-07 RX ORDER — OMEPRAZOLE 40 MG/1
40 CAPSULE, DELAYED RELEASE ORAL
Qty: 30 CAPSULE | Refills: 0 | Status: SHIPPED | OUTPATIENT
Start: 2023-11-07 | End: 2023-11-16 | Stop reason: SDUPTHER

## 2023-11-07 RX ORDER — HEPARIN 100 UNIT/ML
500 SYRINGE INTRAVENOUS
Status: CANCELLED | OUTPATIENT
Start: 2023-11-07

## 2023-11-07 RX ORDER — SODIUM CHLORIDE 0.9 % (FLUSH) 0.9 %
10 SYRINGE (ML) INJECTION
OUTPATIENT
Start: 2023-11-21

## 2023-11-07 RX ORDER — ZOLEDRONIC ACID 0.04 MG/ML
4 INJECTION, SOLUTION INTRAVENOUS
OUTPATIENT
Start: 2023-11-21

## 2023-11-07 RX ORDER — SODIUM CHLORIDE 0.9 % (FLUSH) 0.9 %
10 SYRINGE (ML) INJECTION
Status: CANCELLED | OUTPATIENT
Start: 2023-11-07

## 2023-11-07 RX ORDER — DIPHENHYDRAMINE HYDROCHLORIDE 50 MG/ML
50 INJECTION INTRAMUSCULAR; INTRAVENOUS ONCE AS NEEDED
Status: CANCELLED | OUTPATIENT
Start: 2023-11-07

## 2023-11-07 RX ADMIN — SODIUM CHLORIDE: 0.9 INJECTION, SOLUTION INTRAVENOUS at 10:11

## 2023-11-07 RX ADMIN — ZOLEDRONIC ACID 3.5 MG: 4 INJECTION, SOLUTION, CONCENTRATE INTRAVENOUS at 10:11

## 2023-11-07 NOTE — PLAN OF CARE
Pt received Zometa today and tolerated well, without complications. VSS. Educated patient about Zometa (indications, side effects, possible reactions,  precautions) and verbalized understanding. PIV positive for blood return, saline locked and removed prior to DC, catheter tip intact. Pt DC with no distress noted, ambulated off of unit, via self, w/o event, pleased.

## 2023-11-07 NOTE — PROGRESS NOTES
PROGRESS NOTE    Subjective:       Patient ID: Bhavna Landry is a 68 y.o. female.  MRN: [id number]  : 1955    Chief Complaint: Follow-up    pT1b N0 (i+)  grade 2 ER + NV - HER2 - IDC of the right breast.    History of Present Illness:   Bhavna Landry is a 68 y.o. female who is referred for right breast IDC.     She presented for screening mammogram on 2022 . This identified an asymmetry in the right breast. Follow-up mammogram and ultrasound on 2022 showed a 1 x 0.9 x 0.8 cm mass at the 9 o'clock position of the right breast 3 cm from the nipple. A ultrasound guided biopsy was performed on 2022 with pathology revealing invasive ductal carcinoma of the breast.     She saw Dr. Dhillon and underwent right mastectomy and sentinel node exam.  It showed IDC 8 mm, grade 2, ER strongly positive, NV negative, HER2/jayla 1+, Ki-67 12%.  There was also DCIS, multiple foci up to 8 mm, spanning a distance of 20 mm.  Nuclear grade 2. Two sentinel nodes removed, 1 had isolated tumor cells.    Oncotype DX RS 28; Chemo benefit >15%     She has declined adjuvant chemotherapy.     Started Anastrozole  22, switched to exemestane due to arthralgias.     Interim history:   She presents today for regular follow-up.     Tolerating exemestane fairly well, taking daily. No night sweats, has stable chronic joint pains.     Diffuse joint pains, no exacerbation with exemestane. Continues Tramadol. Will be getting an C spine epidural soon. Also fractured her toe, using a stabilizing shoe.     She was recently diagnosed with TONIE and B12 def. On B12, completed iron. Had a colonoscopy in Dec,(sessile polyps)  had EGD in February that showed gastritis.     Weight and appetite are improving.         Oncology History:  1. Breast, right, mastectomy:   - Invasive ductal carcinoma       - Greatest dimension:  8 mm       - Jonathan-Alaniz modified SBR score 3 + 2 + 1 = 6 of 9       -  "Histologic grade 2 of 3       - Mitotic index = 0.2 (average mitoses per high power field) (low)       - Resection margin free of invasive carcinoma           - Invasive carcinoma present 9 mm to closest posterior margin, 26 mm   to inferior margin, and 45 mm to superior margin   - Biopsy clip present   - No lymphovascular invasion is identified   - Ductal carcinoma in situ       - Foci up to 8 mm in greatest dimension spanning a distance of   approximately 20 mm       - Nuclear grade ranges from 2 to 3 of 3       - Solid and cribriform patterns       - Central comedonecrosis present:  Approximately 80%       - Resection margin free of DCIS           - DCIS present present 9 mm to closest posterior margin, 26 mm to   inferior margin, and 45 mm to superior margin   - Background breast contains usual ductal hyperplasia, apocrine metaplasia,   microcysts and macrocysts, ectatic ducts, and stromal fibrosis   - Microcalcifications, calcium phosphate type, associated with DCIS and   focally with medial calcific arterial stenosis (Monckeberg's sclerosis)   - See CAP Tumor Synoptic   2. [place] lymph node, right axillary, "#1 hot and blue count 2004,"   excision:   - 1 lymph node, isolated tumor cells present   3. [place] lymph node, right axillary, "#2 hot and blue count 1700,"   excision:   - 1 lymph node, negative for metastatic carcinoma   CAP Tumor Synoptic:   Procedure:  Total mastectomy   Specimen laterality:  Right   Tumor site:  9 o'clock   Histologic type:  Invasive carcinoma of no special type (ductal)   Histologic grade (Kegley histologic score):  Kegley score 3 + 2 + 1 =   6 of 9   Glandular (acinar) / tubular differentiation:  Score 3   Nuclear pleomorphism:  Score 2   Mitotic grade:  Score 1   Overall grade:  Grade 2   Tumor size:  8 mm   Tumor focality:  Single focus of invasive carcinoma   Ductal carcinoma in situ (DCIS):  Present (Negative for extensive intraductal   component)       Size " (extent) of DCIS:  Foci up to 8 mm in greatest dimension spanning a   distance of approximately 20 mm       Number blocks with DCIS:  4       Number blocks examined:  13       Architectural patterns:  [place], cribriform, solid       Nuclear grade:  Ranges from grade 2 (intermediate) to grade 3 (high)   Lobular carcinoma in situ (LCIS):  Not identified   Tumor extent:  Not applicable   Lymphovascular invasion:  Not identified   Dermal lymphovascular invasion:  Not identified   Microcalcifications:  Present in DCIS and nonneoplastic tissue   Treatment effect in the breast:  No known pre-surgical therapy   Treatment effect in the lymph nodes:  Not applicable   Margin status for invasive carcinoma:  All margins negative for invasive   carcinoma       Distance from invasive carcinoma closest posterior margin:  9 mm       Distance from invasive carcinoma to inferior margin:  26 mm       Distance from invasive carcinoma to superior margin:  45 mm   Margin status for DCIS:  All margins negative for DCIS       Distant from DCIS to closest posterior margin:  9 mm       Distance from DCIS to inferior margin:  26 mm       Distance from DCIS to superior margin:  45 mm   Regional lymph node status:  Tumor present in regional lymph nodes   Number of lymph nodes with macrometastases:  0   Number of lymph nodes with micrometastases:  0   Number of lymph nodes with isolated tumor cells:  1   Size of largest jose l metastatic deposit:  0.13 mm   Extranodal extension:  Not identified   Total number of lymph nodes examined (sentinel and nonsentinel):  2   Number of sentinel nodes examined:  2   Distant sites involved:  Not applicable   TNM descriptors:  Not applicable   pT category:  pT1b   Regional lymph nodes modifier:  (sn)   pN category:  (sn) pN0 (i+)   pM category:  Not applicable - pM cannot be determined from the submitted   specimens   Special studies:  Biomarkers, assessed by immunohistochemistry on prior right   breast biopsy  (KES-) with following reported results:       - Estrogen Receptor (ER):  Positive (100%; strong intensity)       - Progesterone Receptor (AZ):  Negative (<1%)       - HER2:  Negative (1+)       - Ki-67 proliferation index:  12% (low)       Oncology History   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, estrogen receptor positive   4/13/2022 Initial Diagnosis    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, estrogen receptor positive     4/13/2022 Cancer Staged    Staging form: Breast, AJCC 8th Edition  - Clinical stage from 4/13/2022: Stage IA (cT1b, cN0, cM0, G2, ER+, AZ-, HER2-)     7/19/2022 - 7/19/2022 Chemotherapy    Treatment Summary   Plan Name: OP BREAST TC - DOCETAXEL CYCLOPHOSPHAMIDE Q3W  Treatment Goal: Curative  Status: Inactive  Start Date:   End Date:   Provider: Idania Martins MD  Chemotherapy: cyclophosphamide 600 mg/m2 = 880 mg in sodium chloride 0.9% 250 mL chemo infusion, 600 mg/m2, Intravenous, Clinic/HOD 1 time, 0 of 4 cycles  DOCEtaxeL (TAXOTERE) 75 mg/m2 = 110 mg in sodium chloride 0.9% 250 mL chemo infusion, 75 mg/m2, Intravenous, Clinic/HOD 1 time, 0 of 4 cycles         History:  Past Medical History:   Diagnosis Date    Allergy     Amblyopia     Anemia     Anticoagulant long-term use     Arthritis 02/02/1992    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, estrogen receptor positive 04/13/2022    Cataract     Depression     Dry eyes     Dry mouth     Duane's syndrome of right eye     Early dry stage nonexudative age-related macular degeneration of both eyes 09/20/2022    Fibromyalgia 04/17/2014    Fibromyalgia     Fractured hip     RIGHT HIP    GERD (gastroesophageal reflux disease)     History of psychiatric hospitalization     Hyperlipidemia 02/02/1992    Hypertension     Hypocalcemia     Hyponatremia     Kidney stone     Migraine headache     Osteoporosis     Pressure ulcer of unspecified site, unspecified stage     Psoriatic  arthritis 1992    Right knee pain     post knee replacement surgery (possible rejectiion of metal)    RLS (restless legs syndrome)     Schizophrenia 1992    stable on meds    Sciatica     Squamous cell carcinoma of skin     Urinary tract infection       Past Surgical History:   Procedure Laterality Date    brow ptosis repair Right 2019    Surgeon: Dr. Karla Henson    CATARACT EXTRACTION       SECTION      COLONOSCOPY N/A 2016    Procedure: COLONOSCOPY;  Surgeon: ANDRIA Connelly MD;  Location: [facility] ([address] [place] [address]);  Service: Endoscopy;  Laterality: N/A;    COLONOSCOPY N/A 2022    Procedure: COLONOSCOPY;  Surgeon: Mikey Walters MD;  Location: [facility];  Service: Endoscopy;  Laterality: N/A;    cyst removed from right sinus  1982    EPIDURAL STEROID INJECTION INTO LUMBAR SPINE N/A 2023    Procedure: Injection-steroid-epidural-lumbar L5-S1;  Surgeon: Chirag Kim MD;  Location: [facility];  Service: Pain Management;  Laterality: N/A;  asa    ESOPHAGOGASTRODUODENOSCOPY N/A 2023    Procedure: EGD (ESOPHAGOGASTRODUODENOSCOPY);  Surgeon: Dougie Shea MD;  Location: [facility];  Service: Endoscopy;  Laterality: N/A;    HYSTERECTOMY      VAGINAL HYSTERECTOMY WITHOUT BSO - ENDOMETRIOSIS    INJECTION FOR SENTINEL NODE IDENTIFICATION Right 2022    Procedure: INJECTION, FOR SENTINEL NODE IDENTIFICATION-Right;  Surgeon: NEAL Dhillon MD;  Location: [facility] OR;  Service: General;  Laterality: Right;    INJECTION OF ANESTHETIC AGENT AROUND MEDIAL BRANCH NERVES INNERVATING LUMBAR FACET JOINT N/A 2019    Procedure: Lumbo-sacral Block, DR5 and Lateral Branches of S1,S2, S3;  Surgeon: Michelle Pineda Jr., MD;  Location: [facility];  Service: Pain Management;  Laterality: N/A;  Pt takes  and states she holds ASA on her own whenever she has procedures.  Instructed to hold x 3 days prior to procedure.      INJECTION OF ANESTHETIC AGENT AROUND MEDIAL  BRANCH NERVES INNERVATING LUMBAR FACET JOINT Bilateral 5/3/2023    Procedure: Block-nerve-medial branch-lumbar bilateral L3, L4, L5;  Surgeon: Maile Storm DO;  Location: [facility];  Service: Pain Management;  Laterality: Bilateral;  asa    INJECTION OF ANESTHETIC AGENT INTO SACROILIAC JOINT Right 8/30/2018    Procedure: BLOCK, SACROILIAC JOINT-Right- ORAL SEDATION;  Surgeon: Michelle Pineda Jr., MD;  Location: [facility];  Service: Pain Management;  Laterality: Right;    INJECTION OF ANESTHETIC AGENT INTO SACROILIAC JOINT Bilateral 9/27/2018    Procedure: BLOCK, SACROILIAC JOINT-BILATERAL;  Surgeon: Michelle Pineda Jr., MD;  Location: [facility];  Service: Pain Management;  Laterality: Bilateral;  Please keep at 10:00 due to trasnsportation    INJECTION OF ANESTHETIC AGENT INTO SACROILIAC JOINT Bilateral 2/7/2019    Procedure: Bilateral Sacroiliac Joint Injection - Per Dr Pineda, not necessary to hold ASA.;  Surgeon: Michelle Pineda Jr., MD;  Location: [facility];  Service: Pain Management;  Laterality: Bilateral;    INTRAOCULAR PROSTHESES INSERTION Right 8/1/2019    Procedure: INSERTION, IOL PROSTHESIS;  Surgeon: Karen Song MD;  Location: [address];  Service: Ophthalmology;  Laterality: Right;    INTRAOCULAR PROSTHESES INSERTION Left 9/26/2019    Procedure: INSERTION, IOL PROSTHESIS;  Surgeon: Karen Song MD;  Location: [address];  Service: Ophthalmology;  Laterality: Left;    JOINT REPLACEMENT Right     knee    KNEE SURGERY      MASTECTOMY Right 5/9/2022    Procedure: MASTECTOMY-Right;  Surgeon: NEAL Dhillon MD;  Location: [facility];  Service: General;  Laterality: Right;  2.5 HOURS    PHACOEMULSIFICATION OF CATARACT Right 8/1/2019    Procedure: PHACOEMULSIFICATION, CATARACT;  Surgeon: Karen Song MD;  Location: [address];  Service: Ophthalmology;  Laterality: Right;    PHACOEMULSIFICATION OF CATARACT Left 9/26/2019    Procedure:  PHACOEMULSIFICATION, CATARACT;  Surgeon: Karen Song MD;  Location: [address];  Service: Ophthalmology;  Laterality: Left;    RADIOFREQUENCY THERMOCOAGULATION Right 5/7/2019    Procedure: RADIOFREQUENCY THERMAL COAGULATION RIGHT DORSAL RAMUS 5 AND LATERAL BRANCH OF S1, S2 AND S3;  Surgeon: Michelle Pineda Jr., MD;  Location: [facility];  Service: Pain Management;  Laterality: Right;    RADIOFREQUENCY THERMOCOAGULATION Left 5/14/2019    Procedure: RADIOFREQUENCY THERMAL COAGULATION LEFT DORSAL RAMUS 5 AND LATERAL BRANCH OF S1,S2 AND S3;  Surgeon: Michelle Pineda Jr., MD;  Location: [facility];  Service: Pain Management;  Laterality: Left;    RADIOFREQUENCY THERMOCOAGULATION Right 10/22/2019    Procedure: RADIOFREQUENCY THERMAL COAGULATION---DARSAL RAMUS 5 and LATERAL S1,S2,and S3 Right;  Surgeon: Michelle Pineda Jr., MD;  Location: [facility];  Service: Pain Management;  Laterality: Right;  patient to sign consent DOS    RADIOFREQUENCY THERMOCOAGULATION Left 10/29/2019    Procedure: RADIOFREQUENCY THERMAL COAGULATION - LEFT - DR5, S1,S2, AND S3;  Surgeon: Michelle Pineda Jr., MD;  Location: [facility];  Service: Pain Management;  Laterality: Left;    RADIOFREQUENCY THERMOCOAGULATION Left 5/26/2020    Procedure: RADIOFREQUENCY THERMAL COAGULATION--Left DR5+ lateral branches of S1, S2, S3;  Surgeon: Michelle Pineda Jr., MD;  Location: [facility];  Service: Pain Management;  Laterality: Left;    RADIOFREQUENCY THERMOCOAGULATION Right 6/2/2020    Procedure: RADIOFREQUENCY THERMAL COAGULATION--Right DR5+ lateral branches of S1, S2, S3;  Surgeon: Michelle Pineda Jr., MD;  Location: [facility];  Service: Pain Management;  Laterality: Right;    SENTINEL LYMPH NODE BIOPSY Right 5/9/2022    Procedure: BIOPSY, LYMPH NODE, SENTINEL-Right;  Surgeon: NEAL Dhillon MD;  Location: [facility];  Service: General;  Laterality: Right;    Surgery on right knee  07/09/1982    tumor removed from  back left side upper shoulder  2006     Family History   Problem Relation Age of Onset    Colon cancer Mother     Psoriasis Mother     Cancer Mother         colon    Depression Mother     Cancer Father         lymphoma    Stroke Sister     Diabetes Brother     Arthritis Brother     Heart disease Brother         cad    No Known Problems Daughter     Hypertension Neg Hx     Suicide Neg Hx     Amblyopia Neg Hx     Blindness Neg Hx     Cataracts Neg Hx     Glaucoma Neg Hx     Macular degeneration Neg Hx     Retinal detachment Neg Hx     Strabismus Neg Hx       Social History     Tobacco Use    Smoking status: Every Day     Current packs/day: 0.00     Average packs/day: 0.3 packs/day for 10.0 years (2.5 ttl pk-yrs)     Types: Cigarettes     Start date: 1/10/2013     Last attempt to quit: 1/10/2023     Years since quittin.8     Passive exposure: Never    Smokeless tobacco: Former    Tobacco comments:     about 5 cig a day   Substance and Sexual Activity    Alcohol use: No    Drug use: No    Sexual activity: Not Currently     Partners: Male     Birth control/protection: Post-menopausal        ROS:   Review of Systems   Constitutional:  Positive for weight loss. Negative for fever and malaise/fatigue.   HENT:  Positive for congestion. Negative for ear pain, nosebleeds and sore throat.    Eyes:  Negative for blurred vision, double vision and photophobia.   Respiratory:  Negative for cough, hemoptysis, sputum production, shortness of breath, wheezing and stridor.    Cardiovascular:  Negative for chest pain, palpitations, orthopnea and leg swelling.   Gastrointestinal:  Negative for abdominal pain, blood in stool, constipation, diarrhea, heartburn, melena, nausea and vomiting.   Genitourinary:  Negative for dysuria and hematuria.   Musculoskeletal:  Positive for back pain and joint pain (left hip with recent exacerbation). Negative for myalgias and neck pain.   Skin:  Negative for itching and rash.   Neurological:   "Negative for dizziness, focal weakness, seizures, weakness and headaches.   Endo/Heme/Allergies:  Negative for polydipsia. Does not bruise/bleed easily.   Psychiatric/Behavioral:  Positive for depression. Negative for memory loss. The patient is nervous/anxious. The patient does not have insomnia.         Objective:     Vitals:    11/07/23 0847   BP: 132/61   Pulse: 67   Resp: 18   Temp: 97.8 °F (36.6 °C)   TempSrc: Oral   SpO2: 98%   Weight: 46.2 kg (101 lb 13.6 oz)   Height: 5' 2" (1.575 m)   PainSc:   7             Physical Examination:   Physical Exam  Vitals and nursing note reviewed.   Constitutional:       General: She is not in acute distress.     Appearance: She is not diaphoretic.   HENT:      Head: Normocephalic.   Eyes:      Conjunctiva/sclera: Conjunctivae normal.   Neck:      Thyroid: No thyromegaly.   Cardiovascular:      Rate and Rhythm: Normal rate.   Pulmonary:      Effort: Pulmonary effort is normal.   Abdominal:      General: Bowel sounds are normal.      Palpations: Abdomen is soft.   Musculoskeletal:         General: Normal range of motion.      Cervical back: Neck supple.   Skin:     General: Skin is warm and dry.   Neurological:      Mental Status: She is alert and oriented to person, place, and time.      Gait: Gait is intact.   Psychiatric:         Mood and Affect: Affect normal.         Cognition and Memory: Memory normal.         Judgment: Judgment normal.          Diagnostic Tests:  Significant Imaging: I have reviewed and interpreted all pertinent imaging results/findings.    Laboratory Data:  All pertinent labs have been reviewed.    Labs:   Lab Results   Component Value Date    WBC 8.58 11/07/2023    HGB 11.8 (L) 11/07/2023    HCT 36.2 (L) 11/07/2023    MCV 93 11/07/2023     11/07/2023       Assessment/Plan:   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, estrogen receptor positive  pT1b N0 (i+)  grade 2 ER + TN - HER2 - IDC of the right breast.    She is status " post mastectomy and sentinel node exam for IDC as well as DCIS of the right breast.     Oncotype returned high risk, which confers a benefit ot the addition of chemotherapy to AI.   RSclin showed an 8% risk of recurrence with 4% absolute benefit of chemotherapy. She has declined chemotherapy.     Tolerating exemestane relatively well. Continue at this time.     Continue Zometa q 6 months for bone health and possible risk reduction for breast cancer,cleared to receive today.   Continue active surveillance. Mammogram due May 2024, will see her back after her mammogram.     Osteopenia of multiple sites  Diagnosed with osteopenia/osteoporosis given hip fracture in 2020. Follows up with Dr. Cornelius and is on zoledronic acid, now receiving q 6 months.   Continues on MTX weekly  Follow up bone density scan is normal.     Hip pain   Has degenerative changes L3-L5. Declines surgical evaluation at this time. Continue PT, continue to monitor.     Aromatase inhibitor arthralgias   Continue supportive care. She declined acupuncture referral previously.     Colon Polyps  TONIE  Recent colonoscopy 12/22, path did not show any malignancy.   EGD completed 2/23  Has TONIE again, poor tolerability and response to oral iron. Will give 2 doses of IV venofer at [place].     Weight loss  Stabilizing, further work up as clinically indicated.     Atrial fibrillation   Atherosclerosis of aorta   On aspirin, follows up with Cardiology         ECOG SCORE    1 - Restricted in strenuous activity-ambulatory and able to carry out work of a light nature       MDM includes  :    - Acute or chronic illness or injury that poses a threat to life or bodily function  - Independent review and explanation of 3+ results from unique tests  - Discussion of management and ordering 3+ unique tests  - Extensive discussion of treatment and management  - Prescription drug management  - Drug therapy requiring intensive monitoring for toxicity       Discussion:          Med Onc Chart Routing      Follow up with physician 6 months. same day labs and Zometa   Follow up with LATOYA    Infusion scheduling note   Venfor x 2, sechedule at [place]. zometa in 6 months at [facility]   Injection scheduling note    Labs CBC, ferritin, iron and TIBC and CMP   Scheduling:  Preferred lab:  Lab interval:  cbc, iron tibc ferritin in 3 months, all labs in 6 months, [place] please   Imaging Mammogram   left mammogram 6/1   Pharmacy appointment    Other referrals

## 2023-11-08 ENCOUNTER — TELEPHONE (OUTPATIENT)
Dept: PAIN MEDICINE | Facility: CLINIC | Age: 68
End: 2023-11-08
Payer: MEDICARE

## 2023-11-08 ENCOUNTER — TELEPHONE (OUTPATIENT)
Dept: SPINE | Facility: CLINIC | Age: 68
End: 2023-11-08
Payer: MEDICARE

## 2023-11-08 NOTE — TELEPHONE ENCOUNTER
----- Message from Martha Wayne sent at 11/8/2023 11:07 AM CST -----  Type:  Patient Returning Call    Who Called: pt   Who Left Message for Patient: pt   Does the patient know what this is regarding?: pt need a call to see what time she should come in for her Procedures on Friday   Would the patient rather a call back or a response via [facility]?  Call   Best Call Back Number:[phone]  Additional Information:  call back

## 2023-11-09 ENCOUNTER — CLINICAL SUPPORT (OUTPATIENT)
Dept: REHABILITATION | Facility: HOSPITAL | Age: 68
End: 2023-11-09
Payer: MEDICARE

## 2023-11-09 DIAGNOSIS — R53.1 DECREASED STRENGTH: Primary | ICD-10-CM

## 2023-11-09 DIAGNOSIS — R29.898 DECREASED ROM OF NECK: ICD-10-CM

## 2023-11-09 PROCEDURE — 97530 THERAPEUTIC ACTIVITIES: CPT | Mod: PN

## 2023-11-09 PROCEDURE — 97110 THERAPEUTIC EXERCISES: CPT | Mod: PN

## 2023-11-09 NOTE — PROGRESS NOTES
"[facility]   Physical Therapy Treatment Note      Name: Bhavna Landry  Clinic Number: [id number]    Therapy Diagnosis:   Encounter Diagnoses   Name Primary?    Decreased strength Yes    Decreased ROM of neck      Physician: Mode Cornelius MD    Visit Date: 11/9/2023  Physician Orders: PT Eval and Treat   Medical Diagnosis from Referral:   M47.812 (ICD-10-CM) - Cervical spondylosis   M47.816 (ICD-10-CM) - Lumbar spondylosis      Evaluation Date: 9/26/2023  Authorization Period Expiration: 11/21/2023  Plan of Care Expiration: 11/24/23  Visit # / Visits authorized: 1/1, 10/23  FOTO: 11/10 - Next   PTA Visit: 1/6     Time In: 1:05 PM  Time Out: 2:00 PM  Total Billable Time: 30 minutes (1TA, 1TE)     Precautions: Standard, Fibromyalgia, Migraines, Schizophrenia (stable)      Subjective     Patient reports: blood pressure has been ok recently. Neck was very sore early last week and has been better today. [place] a crack in her neck and her pain got better.   She was compliant with home exercise program.  Response to previous treatment: reports slight increased cervical pain today  Functional change: none reported     Pain: 7/10 constant (ache / soreness)  Location: bilateral cervical / L UT the most.     Objective      Objective Measures updated at progress report unless specified.     Treatment     Jessica received the treatments listed below:      11/9/23  BP pre activity :140/64, HR: 76 bpm, O2 sat: 98% on room air       therapeutic exercises to develop strength, ROM, and flexibility for 30 minutes, including:  -Cervical rotations with towel roll support : 30x5" B - cues to keep in pain free range  -Cervical flexion/extension with towel roll support 10x5"ea - cues to keep in pain free range  -Supine wand flexion 3 x 10 1# - NT  -Supine cervical isometric 5" x 10 ea - therapist hold - NT  -Seated cervical snags 3" x 10ea  - NT, resume next  -Lat pull downs: 3x10 green theraband - NT  -Standing " chicken wings on wall: 12x5'' holds    manual therapy techniques: Joint mobilizations and Soft tissue Mobilization were applied to the: B UT for 15 minutes, including:  -Cervical traction  -Cervical sideglide Bilateral to mid-cervical spine, grade II-III focusing on left side  -Left mid-cervical opening mobilizations, grade II-III  -PAs to CTJ, grade II-III    neuromuscular re-education activities to improve: Balance, Kinesthetic, Sense, and Proprioception for 00 minutes. The following activities were included:  -Chin tucks: 20x5'' - moderate cuing  -Chin tuck + rotation: 10x Bilateral to end range    therapeutic activities to improve functional performance for 10 minutes, including:  Wall slides: 20x5'' holds  -Rows: 2x15 green theraband    Moist heat pack cervical  x 00 min at end of session in supine.    Patient Education and Home Exercises       Education provided:   - cont HEP  -manual therapy expectations    Written Home Exercises Provided: Patient instructed to cont prior HEP. Exercises were reviewed and Jessica was able to demonstrate them prior to the end of the session.  Jessica demonstrated fair  understanding of the education provided. See Electronic Medical Record under Patient Instructions for exercises provided during therapy sessions    Assessment     Bhavna is a 68 y.o. female referred to outpatient Physical Therapy with a medical diagnosis of Cervical spondylosis and lumbar spondylosis.   Pt did well with periscapular elevators today to decreased muscle tension in neck; however her neck presents with left>right hypomobility at mid and upper cervical spine. More limited in rotation than sidebending and tender to palpation at C2 and C4-C5.    Jessica Is progressing well towards her goals.   Patient prognosis is Fair.     Patient will continue to benefit from skilled outpatient physical therapy to address the deficits listed in the problem list box on initial evaluation, provide pt/family education and to  maximize pt's level of independence in the home and community environment.     Patient's spiritual, cultural and educational needs considered and pt agreeable to plan of care and goals.     Anticipated barriers to physical therapy: RA, comorbidities, cervical scoliosis        GOALS: Short Term Goals: 4 weeks  1. Pt will demo good deep neck flexor strength to improve cervical lordosis and stabilization.  2. Increase cervical rotation ROM by 5-10 degrees Bilaterally in order to perform ADLs with decreased difficulty.  3. Pt will demo good sitting and standing posture for improved spine health and decreased neck pain.  4. Pt to tolerate HEP to improve ROM and independence with ADL's.     Long Term Goals: 8 weeks  1. Report decreased neck pain </=2/10 to increase tolerance for ADLs.  2. Increase cervical AROM to WFL with min neck pain for increased functional mobility.  3. Increased strength in B shoulders/scapular stabilizers to >/= 4/5 MMT grade to increase tolerance for ADL and work activities.  4. Pt will report at </= TBD% impaired on FOTO neck score for neck pain disability to demonstrate decrease in disability and improvement in neck pain.    Plan     Cont to advance PT within tolerance.    Cal Arredondo, PT

## 2023-11-13 ENCOUNTER — TELEPHONE (OUTPATIENT)
Dept: PAIN MEDICINE | Facility: CLINIC | Age: 68
End: 2023-11-13
Payer: MEDICARE

## 2023-11-13 DIAGNOSIS — M54.12 CERVICAL RADICULOPATHY: Primary | ICD-10-CM

## 2023-11-14 ENCOUNTER — TELEPHONE (OUTPATIENT)
Dept: INFUSION THERAPY | Facility: HOSPITAL | Age: 68
End: 2023-11-14
Payer: MEDICARE

## 2023-11-14 NOTE — TELEPHONE ENCOUNTER
Confirmed upcoming infusion appointments with the patient. Reviewed pre infusion instructions with the patient  12/5 at 1230p  12/12 at 1230p

## 2023-11-15 ENCOUNTER — TELEPHONE (OUTPATIENT)
Dept: SPINE | Facility: CLINIC | Age: 68
End: 2023-11-15
Payer: MEDICARE

## 2023-11-15 ENCOUNTER — TELEPHONE (OUTPATIENT)
Dept: ORTHOPEDICS | Facility: CLINIC | Age: 68
End: 2023-11-15
Payer: MEDICARE

## 2023-11-15 ENCOUNTER — TELEPHONE (OUTPATIENT)
Dept: PAIN MEDICINE | Facility: CLINIC | Age: 68
End: 2023-11-15
Payer: MEDICARE

## 2023-11-15 ENCOUNTER — TELEPHONE (OUTPATIENT)
Dept: RHEUMATOLOGY | Facility: CLINIC | Age: 68
End: 2023-11-15
Payer: MEDICARE

## 2023-11-15 NOTE — TELEPHONE ENCOUNTER
----- Message from Leon Fields sent at 11/15/2023 11:51 AM CST -----  Pt Requesting Call Back    Who called: pt  Who called for pt:  Best call back #:  [phone]  Add notes: pt have questions regarding her procedure

## 2023-11-15 NOTE — TELEPHONE ENCOUNTER
Spoke with the patient and let her know that Dr Cornelius will not be in clinic on the date she requested. She was ok with that

## 2023-11-15 NOTE — TELEPHONE ENCOUNTER
"----- Message from Ilda Malin sent at 11/15/2023  1:24 PM CST -----  Regarding: Appt request  Contact: @ [phone]  Scheduling Request    Patient Status: Ep    Scheduling Appt: f/u on left foot     Time/Date Preference: Dec. 21st     Relationship to Patient?: Self    Contact Preference?: Call back     Do you feel you need to be seen today? No    Additional Notes:  "Thank you for all that you do for our patients"     "

## 2023-11-15 NOTE — TELEPHONE ENCOUNTER
"----- Message from Patty Ferrell sent at 11/15/2023  1:13 PM CST -----  Consult/Advisory:      Name Of Caller: Self    Contact Preference:  [phone]        What is the nature of the call?: requesting to be seen on 12/21 if possible.       Additional Notes:  "Thank you for all that you do for our patients"          "

## 2023-11-16 ENCOUNTER — TELEPHONE (OUTPATIENT)
Dept: INTERNAL MEDICINE | Facility: CLINIC | Age: 68
End: 2023-11-16
Payer: MEDICARE

## 2023-11-16 ENCOUNTER — TELEPHONE (OUTPATIENT)
Dept: PAIN MEDICINE | Facility: CLINIC | Age: 68
End: 2023-11-16
Payer: MEDICARE

## 2023-11-16 ENCOUNTER — TELEPHONE (OUTPATIENT)
Dept: SPINE | Facility: CLINIC | Age: 68
End: 2023-11-16
Payer: MEDICARE

## 2023-11-16 ENCOUNTER — CLINICAL SUPPORT (OUTPATIENT)
Dept: REHABILITATION | Facility: HOSPITAL | Age: 68
End: 2023-11-16
Payer: MEDICARE

## 2023-11-16 DIAGNOSIS — R53.1 DECREASED STRENGTH: Primary | ICD-10-CM

## 2023-11-16 DIAGNOSIS — R29.898 DECREASED ROM OF NECK: ICD-10-CM

## 2023-11-16 DIAGNOSIS — K21.9 GASTROESOPHAGEAL REFLUX DISEASE, UNSPECIFIED WHETHER ESOPHAGITIS PRESENT: ICD-10-CM

## 2023-11-16 PROCEDURE — 97110 THERAPEUTIC EXERCISES: CPT | Mod: PN

## 2023-11-16 PROCEDURE — 97140 MANUAL THERAPY 1/> REGIONS: CPT | Mod: PN

## 2023-11-16 RX ORDER — OMEPRAZOLE 40 MG/1
40 CAPSULE, DELAYED RELEASE ORAL EVERY MORNING
Qty: 90 CAPSULE | Refills: 3 | Status: SHIPPED | OUTPATIENT
Start: 2023-11-16

## 2023-11-16 NOTE — TELEPHONE ENCOUNTER
----- Message from Marcia Urbina LPN sent at 11/16/2023  8:39 AM CST -----    ----- Message -----  From: Carol Ann Brown  Sent: 11/16/2023   8:26 AM CST  To: [facility] Staff    Type:  Needs Medical Advice    Who Called: pt  Symptoms (please be specific): can you please change change the medicine omeprazole 20 mg once a day to  omeprazole 40 once a day    Would the patient rather a call back or a response via [facility]? call  Best Call Back Number: [phone]  Additional Information:

## 2023-11-16 NOTE — TELEPHONE ENCOUNTER
----- Message from Gregoria Belcher sent at 11/15/2023  4:15 PM CST -----  Regarding: return call  Contact: pt [phone]  PATIENT RETURNING CALL    Pt returned Mireya's call. Please call pt at [phone]

## 2023-11-16 NOTE — PROGRESS NOTES
"[facility]   Physical Therapy Treatment Note      Name: Bhavna Landry  Clinic Number: [id number]    Therapy Diagnosis:   Encounter Diagnoses   Name Primary?    Decreased strength Yes    Decreased ROM of neck      Physician: Mode Cornelius MD    Visit Date: 11/16/2023  Physician Orders: PT Eval and Treat   Medical Diagnosis from Referral:   M47.812 (ICD-10-CM) - Cervical spondylosis   M47.816 (ICD-10-CM) - Lumbar spondylosis      Evaluation Date: 9/26/2023  Authorization Period Expiration: 11/21/2023  Plan of Care Expiration: 11/24/23  Visit # / Visits authorized: 1/1, 12/23  FOTO: 12/10  PTA Visit: 0/6     Time In: 2:05 PM  Time Out: 3:00 PM  Total Billable Time: 55 minutes (1TA, 3TE)     Precautions: Standard, Fibromyalgia, Migraines, Schizophrenia (stable)      Subjective     Patient reports: neck has been sore and seeing about a future injection. Trying to rest it more recently.   She was compliant with home exercise program.  Response to previous treatment: reports slight increased cervical pain today  Functional change: none reported     Pain: 8/10 constant (ache / soreness)  Location: bilateral cervical / L UT the most.     Objective      Objective Measures updated at progress report unless specified.     BP this morning 118/73 mmHg    Treatment     Jessica received the treatments listed below:      therapeutic exercises to develop strength, ROM, and flexibility for 45 minutes, including:  -Cervical rotations with towel roll support : 30x5" B - cues to keep in pain free range - NT  -Cervical flexion/extension with towel roll support 10x5"ea - cues to keep in pain free range - NT  -Supine wand flexion: 3 x 10 2# (1 pillow)  -Supine cervical isometric 5" x 10 ea - therapist hold - NT  -Seated cervical snags 3" x 10ea  - NT  -Standing chicken wings on wall: 20x5'' holds  -Supine shoulder horizontal abduction: 3x10, yellow theraband     manual therapy techniques: Joint mobilizations " and Soft tissue Mobilization were applied to the: B UT for 00 minutes, including:  -Cervical traction  -Cervical sideglide Bilateral to mid-cervical spine, grade II-III focusing on left side  -Left mid-cervical opening mobilizations, grade II-III  -Isometric sideglide for left cervical sidebending at C4-C6  -PAs to CTJ, grade II-III    neuromuscular re-education activities to improve: Balance, Kinesthetic, Sense, and Proprioception for 00 minutes. The following activities were included:  -Chin tucks: 20x5'' - moderate cuing  -Chin tuck + rotation: 10x Bilateral to end range    therapeutic activities to improve functional performance for 10 minutes, including:  -Wall slides: 20x5'' holds  -Rows: 2x15 green theraband  -Lat pull downs: 3x10 green theraband     Moist heat pack cervical  x 00 min at end of session in supine.    Patient Education and Home Exercises       Education provided:   - cont HEP  -manual therapy expectations    Written Home Exercises Provided: Patient instructed to cont prior HEP. Exercises were reviewed and Jessica was able to demonstrate them prior to the end of the session.  Jessica demonstrated fair  understanding of the education provided. See Electronic Medical Record under Patient Instructions for exercises provided during therapy sessions    Assessment     Bhavna is a 68 y.o. female referred to outpatient Physical Therapy with a medical diagnosis of Cervical spondylosis and lumbar spondylosis.   Focused more on [place]-scapular strengthening to avoid any further irritation to cervical spine. Minimal carryover with manual with cervical spine and home exercise program compliance is questionable. Trial heavier scapular strengthening to ease neck pain, and will then return to cervical mobility/strengthening activities.       Jessica Is progressing well towards her goals.   Patient prognosis is Fair.     Patient will continue to benefit from skilled outpatient physical therapy to address the deficits  listed in the problem list box on initial evaluation, provide pt/family education and to maximize pt's level of independence in the home and community environment.     Patient's spiritual, cultural and educational needs considered and pt agreeable to plan of care and goals.     Anticipated barriers to physical therapy: RA, comorbidities, cervical scoliosis        GOALS: Short Term Goals: 4 weeks  1. Pt will demo good deep neck flexor strength to improve cervical lordosis and stabilization.  2. Increase cervical rotation ROM by 5-10 degrees Bilaterally in order to perform ADLs with decreased difficulty.  3. Pt will demo good sitting and standing posture for improved spine health and decreased neck pain.  4. Pt to tolerate HEP to improve ROM and independence with ADL's.     Long Term Goals: 8 weeks  1. Report decreased neck pain </=2/10 to increase tolerance for ADLs.  2. Increase cervical AROM to WFL with min neck pain for increased functional mobility.  3. Increased strength in B shoulders/scapular stabilizers to >/= 4/5 MMT grade to increase tolerance for ADL and work activities.  4. Pt will report at </= TBD% impaired on FOTO neck score for neck pain disability to demonstrate decrease in disability and improvement in neck pain.    Plan     Cont to advance PT within tolerance.    Cal Arredondo, PT

## 2023-11-16 NOTE — TELEPHONE ENCOUNTER
Returned pt call, pt asking why she didn't receive any refills on her refill for Omeprazole. Pt states this medication really helps. She also said her oncologist scheduled her for 2 iron infusions because the pills were making her constipated.

## 2023-11-20 ENCOUNTER — CLINICAL SUPPORT (OUTPATIENT)
Dept: REHABILITATION | Facility: HOSPITAL | Age: 68
End: 2023-11-20
Payer: MEDICARE

## 2023-11-20 DIAGNOSIS — R53.1 DECREASED STRENGTH: Primary | ICD-10-CM

## 2023-11-20 DIAGNOSIS — R29.898 DECREASED ROM OF NECK: ICD-10-CM

## 2023-11-20 PROCEDURE — 97110 THERAPEUTIC EXERCISES: CPT | Mod: PN

## 2023-11-20 PROCEDURE — 97530 THERAPEUTIC ACTIVITIES: CPT | Mod: PN

## 2023-11-21 ENCOUNTER — CLINICAL SUPPORT (OUTPATIENT)
Dept: REHABILITATION | Facility: HOSPITAL | Age: 68
End: 2023-11-21
Payer: MEDICARE

## 2023-11-21 ENCOUNTER — PATIENT MESSAGE (OUTPATIENT)
Dept: PAIN MEDICINE | Facility: CLINIC | Age: 68
End: 2023-11-21
Payer: MEDICARE

## 2023-11-21 DIAGNOSIS — R53.1 DECREASED STRENGTH: Primary | ICD-10-CM

## 2023-11-21 DIAGNOSIS — R29.898 DECREASED ROM OF NECK: ICD-10-CM

## 2023-11-21 PROCEDURE — 97110 THERAPEUTIC EXERCISES: CPT | Mod: PN,CQ

## 2023-11-21 PROCEDURE — 97140 MANUAL THERAPY 1/> REGIONS: CPT | Mod: PN,CQ

## 2023-11-21 NOTE — PROGRESS NOTES
"[facility]   Physical Therapy Treatment Note      Name: Bhavna Landry  Clinic Number: [id number]    Therapy Diagnosis:   Encounter Diagnoses   Name Primary?    Decreased strength Yes    Decreased ROM of neck      Physician: Mode Cornelius MD    Visit Date: 11/21/2023  Physician Orders: PT Eval and Treat   Medical Diagnosis from Referral:   M47.812 (ICD-10-CM) - Cervical spondylosis   M47.816 (ICD-10-CM) - Lumbar spondylosis      Evaluation Date: 9/26/2023  Authorization Period Expiration: 11/21/2023  Plan of Care Expiration: 11/24/23  Visit # / Visits authorized: 1/1, 11/23  FOTO: 12/10  PTA Visit: 1/6     Time In: 8:00 AM  Time Out: 9:00 AM  Total Billable Time: 55 minutes (2 MT, 2TE)     Precautions: Standard, Fibromyalgia, Migraines, Schizophrenia (stable)      Subjective     Patient reports: "I've been feeling sore since the last visit yesterday." Pt agreeable to PT session. She reports she is able   She was compliant with home exercise program.  Response to previous treatment: reports slight increased cervical pain today  Functional change: none reported     Pain: 8/10 constant (ache / soreness) arrival,  4/10 at end of session.  Location: bilateral cervical / L UT the most.     Objective      Objective Measures updated at progress report unless specified.     BP this morning 118/73 mmHg    Treatment     Jessica received the treatments listed below:      therapeutic exercises to develop strength, ROM, and flexibility for 30 minutes, including:  -Cervical rotations with towel roll support : 30x5" B - cues to keep in pain free range  -Cervical flexion/extension with towel roll support 10x5"ea - cues to keep in pain free range - NT  -Supine wand flexion with towel roll at CTJ: 3 x 10 2# (1 pillow)  -Supine cervical isometric 5" x 10 ea - therapist hold - NT  -Seated cervical snags 3" x 10ea  - NT  -Standing chicken wings on wall: 20x5'' holds    manual therapy techniques: Joint " mobilizations and Soft tissue Mobilization were applied to the: B UT for 25 minutes, including:  -STM B UT  -STM to upper thoracic/ cervical paraspinals  -Manual shoulder depressions (multiple bouts.  Not this visit:  -Cervical traction  -Cervical sideglide Bilateral to mid-cervical spine, grade II-III focusing on left side  -Left mid-cervical opening mobilizations, grade II-III  -Isometric sideglide for left cervical sidebending at C4-C6  -PAs to CTJ, grade II-III     neuromuscular re-education activities to improve: Balance, Kinesthetic, Sense, and Proprioception for 00 minutes. The following activities were included:  -Chin tucks: 20x5'' - moderate cuing  -Chin tuck + rotation: 10x Bilateral to end range    therapeutic activities to improve functional performance for 00 minutes, including:  -Wall slides: 20x5'' holds  -Rows: 2x15 green theraband    Moist heat pack cervical  x 00 min at end of session in supine.    Patient Education and Home Exercises       Education provided:   - cont HEP  -manual therapy expectations    Written Home Exercises Provided: Patient instructed to cont prior HEP. Exercises were reviewed and Jessica was able to demonstrate them prior to the end of the session.  Jessica demonstrated fair  understanding of the education provided. See Electronic Medical Record under Patient Instructions for exercises provided during therapy sessions    Assessment     Bhavna is a 68 y.o. female referred to outpatient Physical Therapy with a medical diagnosis of Cervical spondylosis and lumbar spondylosis.   Completed today's session with no reports of increased cervical pain following manual therapy coupled with ROM therex.    Jessica Is progressing well towards her goals.   Patient prognosis is Fair.     Patient will continue to benefit from skilled outpatient physical therapy to address the deficits listed in the problem list box on initial evaluation, provide pt/family education and to maximize pt's level of  independence in the home and community environment.     Patient's spiritual, cultural and educational needs considered and pt agreeable to plan of care and goals.     Anticipated barriers to physical therapy: RA, comorbidities, cervical scoliosis        GOALS: Short Term Goals: 4 weeks  1. Pt will demo good deep neck flexor strength to improve cervical lordosis and stabilization.  2. Increase cervical rotation ROM by 5-10 degrees Bilaterally in order to perform ADLs with decreased difficulty.  3. Pt will demo good sitting and standing posture for improved spine health and decreased neck pain.  4. Pt to tolerate HEP to improve ROM and independence with ADL's.     Long Term Goals: 8 weeks  1. Report decreased neck pain </=2/10 to increase tolerance for ADLs.  2. Increase cervical AROM to WFL with min neck pain for increased functional mobility.  3. Increased strength in B shoulders/scapular stabilizers to >/= 4/5 MMT grade to increase tolerance for ADL and work activities.  4. Pt will report at </= TBD% impaired on FOTO neck score for neck pain disability to demonstrate decrease in disability and improvement in neck pain.    Plan     Cont to advance PT within tolerance.    Vern Uribe, SCOTT

## 2023-11-24 NOTE — PROGRESS NOTES
"[facility]   Physical Therapy Treatment Note      Name: Bhavna Landry  Clinic Number: [id number]    Therapy Diagnosis:   Encounter Diagnoses   Name Primary?    Decreased strength Yes    Decreased ROM of neck      Physician: Mode Cornelius MD    Visit Date: 11/20/2023  Physician Orders: PT Eval and Treat   Medical Diagnosis from Referral:   M47.812 (ICD-10-CM) - Cervical spondylosis   M47.816 (ICD-10-CM) - Lumbar spondylosis      Evaluation Date: 9/26/2023  Authorization Period Expiration: 11/21/2023  Plan of Care Expiration: 11/24/23  Visit # / Visits authorized: 1/1, 13/23  FOTO: 13/10 - Next  PTA Visit: 0/6     Time In: 1:30 PM  Time Out: 2:30 PM  Total Billable Time: 30 minutes (1TA, 1TE)     Precautions: Standard, Fibromyalgia, Migraines, Schizophrenia (stable)      Subjective     Patient reports: neck still sore but not as bad.  She was compliant with home exercise program.  Response to previous treatment: reports slight increased cervical pain today  Functional change: none reported     Pain: 8/10 constant (ache / soreness)  Location: bilateral cervical / L UT the most.     Objective      Objective Measures updated at progress report unless specified.     BP this morning 118/73 mmHg    Treatment     Jessica received the treatments listed below:      therapeutic exercises to develop strength, ROM, and flexibility for 45 minutes, including:  -Cervical rotations with towel roll support : 30x5" B - cues to keep in pain free range - NT  -Cervical flexion/extension with towel roll support 10x5"ea - cues to keep in pain free range - NT  -Supine wand flexion: 3 x 10 2# (1 pillow)  -Supine cervical isometric 5" x 10 ea - therapist hold - NT  -Seated cervical snags 3" x 10ea  - NT  -Standing chicken wings on wall: 20x5'' holds  -Supine shoulder horizontal abduction: 3x10, yellow theraband     manual therapy techniques: Joint mobilizations and Soft tissue Mobilization were applied to the: B " UT for 00 minutes, including:  -Cervical traction  -Cervical sideglide Bilateral to mid-cervical spine, grade II-III focusing on left side  -Left mid-cervical opening mobilizations, grade II-III  -Isometric sideglide for left cervical sidebending at C4-C6  -PAs to CTJ, grade II-III    neuromuscular re-education activities to improve: Balance, Kinesthetic, Sense, and Proprioception for 00 minutes. The following activities were included:  -Chin tucks: 20x5'' - moderate cuing  -Chin tuck + rotation: 10x Bilateral to end range    therapeutic activities to improve functional performance for 10 minutes, including:  -Wall slides: 20x5'' holds  -Rows: 2x15 green theraband  -Lat pull downs: 3x10 green theraband     Moist heat pack cervical  x 00 min at end of session in supine.    Patient Education and Home Exercises       Education provided:   - cont HEP  -manual therapy expectations    Written Home Exercises Provided: Patient instructed to cont prior HEP. Exercises were reviewed and Jessica was able to demonstrate them prior to the end of the session.  Jessica demonstrated fair  understanding of the education provided. See Electronic Medical Record under Patient Instructions for exercises provided during therapy sessions    Assessment     Bhavna is a 68 y.o. female referred to outpatient Physical Therapy with a medical diagnosis of Cervical spondylosis and lumbar spondylosis.   Fairly high neck irritability and still focusing on indirect cervical strengthening via thoracic and shoulder strengthening. UPOC next visit when she sees a PT, may place patient on hold until pt received cervical injection. Recurrent fear and avoidance when it comes to cervical activities and very low tolerance for mobility and strengthening directed to cervical spine.    Jessica Is progressing well towards her goals.   Patient prognosis is Fair.     Patient will continue to benefit from skilled outpatient physical therapy to address the deficits  listed in the problem list box on initial evaluation, provide pt/family education and to maximize pt's level of independence in the home and community environment.     Patient's spiritual, cultural and educational needs considered and pt agreeable to plan of care and goals.     Anticipated barriers to physical therapy: RA, comorbidities, cervical scoliosis        GOALS: Short Term Goals: 4 weeks  1. Pt will demo good deep neck flexor strength to improve cervical lordosis and stabilization.  2. Increase cervical rotation ROM by 5-10 degrees Bilaterally in order to perform ADLs with decreased difficulty.  3. Pt will demo good sitting and standing posture for improved spine health and decreased neck pain.  4. Pt to tolerate HEP to improve ROM and independence with ADL's.     Long Term Goals: 8 weeks  1. Report decreased neck pain </=2/10 to increase tolerance for ADLs.  2. Increase cervical AROM to WFL with min neck pain for increased functional mobility.  3. Increased strength in B shoulders/scapular stabilizers to >/= 4/5 MMT grade to increase tolerance for ADL and work activities.  4. Pt will report at </= TBD% impaired on FOTO neck score for neck pain disability to demonstrate decrease in disability and improvement in neck pain.    Plan     Cont to advance PT within tolerance.    Cal Arredondo, PT

## 2023-11-28 ENCOUNTER — CLINICAL SUPPORT (OUTPATIENT)
Dept: REHABILITATION | Facility: HOSPITAL | Age: 68
End: 2023-11-28
Payer: MEDICARE

## 2023-11-28 DIAGNOSIS — R29.898 DECREASED ROM OF NECK: ICD-10-CM

## 2023-11-28 DIAGNOSIS — R53.1 DECREASED STRENGTH: Primary | ICD-10-CM

## 2023-11-28 PROCEDURE — 97140 MANUAL THERAPY 1/> REGIONS: CPT | Mod: PN,CQ

## 2023-11-28 PROCEDURE — 97110 THERAPEUTIC EXERCISES: CPT | Mod: PN,CQ

## 2023-11-30 NOTE — PROGRESS NOTES
"[facility]   Physical Therapy Treatment Note      Name: Bhavna Landry  Clinic Number: [id number]    Therapy Diagnosis:   Encounter Diagnoses   Name Primary?    Decreased strength Yes    Decreased ROM of neck      Physician: Mode Cornelius MD    Visit Date: 11/28/2023  Physician Orders: PT Eval and Treat   Medical Diagnosis from Referral:   M47.812 (ICD-10-CM) - Cervical spondylosis   M47.816 (ICD-10-CM) - Lumbar spondylosis      Evaluation Date: 9/26/2023  Authorization Period Expiration: 11/21/2023  Plan of Care Expiration: 11/24/23  Visit # / Visits authorized: 1/1, 11/23  FOTO: 12/10  PTA Visit: 1/6     Time In: 8:00 AM  Time Out: 9:00 AM  Total Billable Time: 55 minutes (2 MT, 2TE)     Precautions: Standard, Fibromyalgia, Migraines, Schizophrenia (stable)      Subjective     Patient reports: "I've been feeling sore since the last visit yesterday." Pt agreeable to PT session.    She was compliant with home exercise program.  Response to previous treatment: reports slight increased cervical pain today  Functional change: none reported     Pain: 8/10 constant (ache / soreness) arrival,  4/10 at end of session.  Location: bilateral cervical / L UT the most.     Objective      Objective Measures updated at progress report unless specified.     BP this morning 118/73 mmHg    Treatment     Jessica received the treatments listed below:      therapeutic exercises to develop strength, ROM, and flexibility for 30 minutes, including:  -Cervical rotations with towel roll support : 30x5" B - cues to keep in pain free range  -Cervical flexion/extension with towel roll support 10x5"ea - cues to keep in pain free range - NT  -Supine wand flexion with towel roll at CTJ: 3 x 10 2# (1 pillow)  -Supine cervical isometric 5" x 10 ea - therapist hold - NT  -Seated cervical snags 3" x 10ea  - NT  -Standing chicken wings on wall: 20x5'' holds    manual therapy techniques: Joint mobilizations and Soft tissue " Mobilization were applied to the: B UT for 25 minutes, including:  -STM B UT  -STM to upper thoracic/ cervical paraspinals  -Manual shoulder depressions (multiple bouts.  Not this visit:  -Cervical traction  -Cervical sideglide Bilateral to mid-cervical spine, grade II-III focusing on left side  -Left mid-cervical opening mobilizations, grade II-III  -Isometric sideglide for left cervical sidebending at C4-C6  -PAs to CTJ, grade II-III     neuromuscular re-education activities to improve: Balance, Kinesthetic, Sense, and Proprioception for 00 minutes. The following activities were included:  -Chin tucks: 20x5'' - moderate cuing  -Chin tuck + rotation: 10x Bilateral to end range    therapeutic activities to improve functional performance for 00 minutes, including:  -Wall slides: 20x5'' holds  -Rows: 2x15 green theraband    Moist heat pack cervical  x 10 min at end of session in supine.    Patient Education and Home Exercises       Education provided:   - cont HEP  -manual therapy expectations    Written Home Exercises Provided: Patient instructed to cont prior HEP. Exercises were reviewed and Jessica was able to demonstrate them prior to the end of the session.  Jessica demonstrated fair  understanding of the education provided. See Electronic Medical Record under Patient Instructions for exercises provided during therapy sessions    Assessment     Bhavna is a 68 y.o. female referred to outpatient Physical Therapy with a medical diagnosis of Cervical spondylosis and lumbar spondylosis.   Completed today's session with reports of decreased cervical pain following manual therapy coupled with ROM therex pain rating down to 3/10 cervical pain.   Jessica Is progressing well towards her goals.   Patient prognosis is Fair.     Patient will continue to benefit from skilled outpatient physical therapy to address the deficits listed in the problem list box on initial evaluation, provide pt/family education and to maximize pt's  level of independence in the home and community environment.     Patient's spiritual, cultural and educational needs considered and pt agreeable to plan of care and goals.     Anticipated barriers to physical therapy: RA, comorbidities, cervical scoliosis        GOALS: Short Term Goals: 4 weeks  1. Pt will demo good deep neck flexor strength to improve cervical lordosis and stabilization.  2. Increase cervical rotation ROM by 5-10 degrees Bilaterally in order to perform ADLs with decreased difficulty.  3. Pt will demo good sitting and standing posture for improved spine health and decreased neck pain.  4. Pt to tolerate HEP to improve ROM and independence with ADL's.     Long Term Goals: 8 weeks  1. Report decreased neck pain </=2/10 to increase tolerance for ADLs.  2. Increase cervical AROM to WFL with min neck pain for increased functional mobility.  3. Increased strength in B shoulders/scapular stabilizers to >/= 4/5 MMT grade to increase tolerance for ADL and work activities.  4. Pt will report at </= TBD% impaired on FOTO neck score for neck pain disability to demonstrate decrease in disability and improvement in neck pain.    Plan     Cont to advance PT within tolerance.    Vern Uribe, SCOTT

## 2023-12-01 ENCOUNTER — TELEPHONE (OUTPATIENT)
Dept: PAIN MEDICINE | Facility: CLINIC | Age: 68
End: 2023-12-01
Payer: MEDICARE

## 2023-12-05 ENCOUNTER — INFUSION (OUTPATIENT)
Dept: INFUSION THERAPY | Facility: HOSPITAL | Age: 68
End: 2023-12-05
Attending: INTERNAL MEDICINE
Payer: MEDICARE

## 2023-12-05 VITALS
BODY MASS INDEX: 18.83 KG/M2 | DIASTOLIC BLOOD PRESSURE: 65 MMHG | HEART RATE: 85 BPM | SYSTOLIC BLOOD PRESSURE: 144 MMHG | TEMPERATURE: 98 F | OXYGEN SATURATION: 99 % | WEIGHT: 102.31 LBS | RESPIRATION RATE: 18 BRPM | HEIGHT: 62 IN

## 2023-12-05 DIAGNOSIS — L40.50 PSORIATIC ARTHRITIS: ICD-10-CM

## 2023-12-05 DIAGNOSIS — D50.0 IRON DEFICIENCY ANEMIA DUE TO CHRONIC BLOOD LOSS: Primary | ICD-10-CM

## 2023-12-05 PROCEDURE — 63600175 PHARM REV CODE 636 W HCPCS: Performed by: INTERNAL MEDICINE

## 2023-12-05 PROCEDURE — 96365 THER/PROPH/DIAG IV INF INIT: CPT

## 2023-12-05 PROCEDURE — 25000003 PHARM REV CODE 250: Performed by: INTERNAL MEDICINE

## 2023-12-05 PROCEDURE — 96366 THER/PROPH/DIAG IV INF ADDON: CPT

## 2023-12-05 RX ORDER — METHOTREXATE 2.5 MG/1
20 TABLET ORAL
Qty: 96 TABLET | Refills: 0 | Status: SHIPPED | OUTPATIENT
Start: 2023-12-05 | End: 2024-02-23 | Stop reason: SDUPTHER

## 2023-12-05 RX ORDER — SODIUM CHLORIDE 0.9 % (FLUSH) 0.9 %
10 SYRINGE (ML) INJECTION
Status: CANCELLED | OUTPATIENT
Start: 2023-12-12

## 2023-12-05 RX ORDER — EPINEPHRINE 0.3 MG/.3ML
0.3 INJECTION SUBCUTANEOUS ONCE AS NEEDED
Status: CANCELLED | OUTPATIENT
Start: 2023-12-12

## 2023-12-05 RX ORDER — EPINEPHRINE 0.3 MG/.3ML
0.3 INJECTION SUBCUTANEOUS ONCE AS NEEDED
Status: DISCONTINUED | OUTPATIENT
Start: 2023-12-05 | End: 2023-12-05 | Stop reason: HOSPADM

## 2023-12-05 RX ORDER — HEPARIN 100 UNIT/ML
500 SYRINGE INTRAVENOUS
Status: CANCELLED | OUTPATIENT
Start: 2023-12-12

## 2023-12-05 RX ORDER — DIPHENHYDRAMINE HYDROCHLORIDE 50 MG/ML
50 INJECTION INTRAMUSCULAR; INTRAVENOUS ONCE AS NEEDED
Status: DISCONTINUED | OUTPATIENT
Start: 2023-12-05 | End: 2023-12-05 | Stop reason: HOSPADM

## 2023-12-05 RX ORDER — SODIUM CHLORIDE 0.9 % (FLUSH) 0.9 %
10 SYRINGE (ML) INJECTION
Status: DISCONTINUED | OUTPATIENT
Start: 2023-12-05 | End: 2023-12-05 | Stop reason: HOSPADM

## 2023-12-05 RX ORDER — DIPHENHYDRAMINE HYDROCHLORIDE 50 MG/ML
50 INJECTION INTRAMUSCULAR; INTRAVENOUS ONCE AS NEEDED
Status: CANCELLED | OUTPATIENT
Start: 2023-12-12

## 2023-12-05 RX ADMIN — SODIUM CHLORIDE: 9 INJECTION, SOLUTION INTRAVENOUS at 12:12

## 2023-12-05 RX ADMIN — IRON SUCROSE 400 MG: 20 INJECTION, SOLUTION INTRAVENOUS at 01:12

## 2023-12-05 NOTE — TELEPHONE ENCOUNTER
----- Message from Mercedes Brown MA sent at 12/4/2023  4:48 PM CST -----  Regarding: FW: Patient needs updates about a refill, she does not have enough to take in the afternoons  Contact: @[phone]    ----- Message -----  From: Estela Kruse  Sent: 12/4/2023   4:46 PM CST  To: Ashli PANCHAL Staff  Subject: Patient needs updates about a refill, she do#    Regarding:methotrexate 2.5 MG Tab    Patient needs updates about a refill, she does not have enough to take in the afternoons- will be out completely until next appt, please advise    Contact: Bhavna    Type: Call back to advise    Who Called:  Bhavna    Would the patient rather a call back or a response via [facility]?     Best Call Back Number: [phone]      Additional Information:     [id number] - [place] - [address] AT [facility] [address]  [address] [address]  Phone: [phone] Fax: [phone]

## 2023-12-05 NOTE — NURSING
Pt tolerated Venofer 400 mg IV infusion well. Patient was observed 30 minutes post treatment. No adverse reaction noted.   IV flushed with NS and D/C per protocol.  Patient left clinic in no acute distress.

## 2023-12-07 NOTE — PRE-PROCEDURE INSTRUCTIONS
The following was discussed with pt via phone and sent to pt portal. Pt verbalized understanding.    Dear Jessica ,  You are scheduled for a pain procedure on 12/08 with Dr. Jensen, please report to [facility] [address] [facility].     Please park in the lot in front of the building and enter at the main entrance. Proceed to the second floor for registration.     Arrival time: 06:30 am     Anesthesia fasting instructions:   IV sedation. You should not eat for 8 hours and can only drink clear liquids (water or black coffee without cream/sugar) up until 2 hours before your scheduled time.  You CANNOT drive yourself and must have a .     *Refrain from drinking any alcohol  *You may NOT drive yourself home after your procedure.  *Please insure that you have pre-planned your ride home.    *You may not leave alone in an uber, taxi, etc. if you have received sedating medications by mouth or by vein  *You must be discharged to a responsible adult.   *Please remain under adult supervision for 24 hours.   If possible, please have your visitor &/or ride home stay during your visit.   The surgeon should speak with your visitors after your procedure.  All visitors must be 18 years of age or older. Please limit your visitors to max of 2 people.     Day of Procedure  No food, no drink 8 hours prior to your arrival time, or 6 hours if diabetic.      *You should take any medications that you routinely take for blood pressure, heart medications, thyroid, cholesterol, etc with small sips of water.   *If you are a diabetic, do not take your medication if you will be fasting, but bring it with you.   *Please plan on being here for roughly 2 hours.   *Hold vitamins and supplements the morning of your procedure.  *Hold any Anticoagulants (ex. Aspirin, goody or BC powder, Coumadin, Eliquis, Heparin, Plavix, Lovenox, Xarelto, etc.)  *Hold any non-insulin injections until after procedure (Ozempic, Mounjaro, Trulicity,  Victoza, Byetta, Wegovy, Adlyxin, etc) (you should have been instructed to hold for a 7 day period)     Shower the night before and the morning of your procedure with antibacterial soap.   Please do not use antibacterial soap to wash your face. Use your regular face soap.  Do not apply any products to your skin nor your hair after you shower the morning of your procedure.         Products include: lotions, oils, ointments, creams, gels, powders, lotion, deodorant, make-up, perfume/cologne, after shave and sunscreen.     Wear loose, comfortable clothing.  No jewelry. No contacts. Bring glasses if necessary.  If you have dentures, please bring a case.  Please leave all jewelry and valuables at home.     ---If you start to feel sick (fever, chills, coughing, sore throat, etc) or start on or have been taking any antibiotics, please contact your doctor's office at [phone] your procedure would have to be rescheduled.      Thanks,  Catina, LPN [facility]  Pre-Admit Dept

## 2023-12-08 ENCOUNTER — HOSPITAL ENCOUNTER (OUTPATIENT)
Facility: HOSPITAL | Age: 68
Discharge: HOME | End: 2023-12-08
Attending: STUDENT IN AN ORGANIZED HEALTH CARE EDUCATION/TRAINING PROGRAM | Admitting: STUDENT IN AN ORGANIZED HEALTH CARE EDUCATION/TRAINING PROGRAM
Payer: MEDICARE

## 2023-12-08 VITALS
DIASTOLIC BLOOD PRESSURE: 61 MMHG | OXYGEN SATURATION: 98 % | SYSTOLIC BLOOD PRESSURE: 145 MMHG | TEMPERATURE: 98 F | WEIGHT: 103 LBS | HEIGHT: 62 IN | BODY MASS INDEX: 18.95 KG/M2 | RESPIRATION RATE: 16 BRPM | HEART RATE: 74 BPM

## 2023-12-08 DIAGNOSIS — G89.29 CHRONIC PAIN: ICD-10-CM

## 2023-12-08 DIAGNOSIS — M50.30 DDD (DEGENERATIVE DISC DISEASE), CERVICAL: Primary | ICD-10-CM

## 2023-12-08 PROCEDURE — 25500020 PHARM REV CODE 255: Performed by: STUDENT IN AN ORGANIZED HEALTH CARE EDUCATION/TRAINING PROGRAM

## 2023-12-08 PROCEDURE — 62321 PR INJ CERV/THORAC, W/GUIDANCE: ICD-10-PCS | Mod: ,,, | Performed by: STUDENT IN AN ORGANIZED HEALTH CARE EDUCATION/TRAINING PROGRAM

## 2023-12-08 PROCEDURE — 63600175 PHARM REV CODE 636 W HCPCS: Performed by: STUDENT IN AN ORGANIZED HEALTH CARE EDUCATION/TRAINING PROGRAM

## 2023-12-08 PROCEDURE — 62321 NJX INTERLAMINAR CRV/THRC: CPT | Mod: ,,, | Performed by: STUDENT IN AN ORGANIZED HEALTH CARE EDUCATION/TRAINING PROGRAM

## 2023-12-08 PROCEDURE — 25000003 PHARM REV CODE 250: Performed by: STUDENT IN AN ORGANIZED HEALTH CARE EDUCATION/TRAINING PROGRAM

## 2023-12-08 PROCEDURE — 62321 NJX INTERLAMINAR CRV/THRC: CPT | Performed by: STUDENT IN AN ORGANIZED HEALTH CARE EDUCATION/TRAINING PROGRAM

## 2023-12-08 RX ORDER — LIDOCAINE HYDROCHLORIDE 10 MG/ML
1 INJECTION, SOLUTION EPIDURAL; INFILTRATION; INTRACAUDAL; PERINEURAL ONCE
Status: DISCONTINUED | OUTPATIENT
Start: 2023-12-08 | End: 2023-12-08 | Stop reason: HOSPADM

## 2023-12-08 RX ORDER — LIDOCAINE HYDROCHLORIDE 20 MG/ML
INJECTION, SOLUTION EPIDURAL; INFILTRATION; INTRACAUDAL; PERINEURAL
Status: DISCONTINUED | OUTPATIENT
Start: 2023-12-08 | End: 2023-12-08 | Stop reason: HOSPADM

## 2023-12-08 RX ORDER — DEXAMETHASONE SODIUM PHOSPHATE 10 MG/ML
INJECTION INTRAMUSCULAR; INTRAVENOUS
Status: DISCONTINUED | OUTPATIENT
Start: 2023-12-08 | End: 2023-12-08 | Stop reason: HOSPADM

## 2023-12-08 RX ORDER — FENTANYL CITRATE 50 UG/ML
INJECTION, SOLUTION INTRAMUSCULAR; INTRAVENOUS
Status: DISCONTINUED | OUTPATIENT
Start: 2023-12-08 | End: 2023-12-08 | Stop reason: HOSPADM

## 2023-12-08 RX ORDER — LIDOCAINE HYDROCHLORIDE 10 MG/ML
INJECTION, SOLUTION EPIDURAL; INFILTRATION; INTRACAUDAL; PERINEURAL
Status: DISCONTINUED | OUTPATIENT
Start: 2023-12-08 | End: 2023-12-08 | Stop reason: HOSPADM

## 2023-12-08 RX ORDER — SODIUM CHLORIDE 9 MG/ML
500 INJECTION, SOLUTION INTRAVENOUS CONTINUOUS
Status: DISCONTINUED | OUTPATIENT
Start: 2023-12-08 | End: 2023-12-08 | Stop reason: HOSPADM

## 2023-12-08 RX ORDER — MIDAZOLAM HYDROCHLORIDE 1 MG/ML
INJECTION INTRAMUSCULAR; INTRAVENOUS
Status: DISCONTINUED | OUTPATIENT
Start: 2023-12-08 | End: 2023-12-08 | Stop reason: HOSPADM

## 2023-12-08 NOTE — OP NOTE
Cervical Interlaminar Epidural Steroid Injection under Fluoroscopic Guidance    The procedure, risks, benefits, and options were discussed with the patient. There are no contraindications to the procedure. The patent expressed understanding and agreed to the procedure. Informed written consent was obtained prior to the start of the procedure and can be found in the patient's chart.     PATIENT NAME: Bhavna Landry   MRN: [id number]     DATE OF PROCEDURE: 12/08/2023    PROCEDURE: Cervical Interlaminar Epidural Steroid Injection C6/C7 under Fluoroscopic Guidance    PRE-OP DIAGNOSIS: Cervical radiculopathy [M54.12] Cervical radiculopathy [M54.12]    POST-OP DIAGNOSIS: Same    PHYSICIAN: Spring Jensen MD     ASSISTANTS: None    MEDICATIONS INJECTED: Preservative-free Decadron 10mg with 1cc of Lidocaine 1% MPF and preservative free normal saline    LOCAL ANESTHETIC INJECTED: Xylocaine 2%     SEDATION: Versed 1mg and Fentanyl 50mcg                                                                                                                                                                                     Conscious sedation ordered by M.D. Patient re-evaluation prior to administration of conscious sedation. No changes noted in patient's status from initial evaluation. The patient's vital signs were monitored by RN and patient remained hemodynamically stable throughout the procedure.    Event Time In   Sedation Start 0752   Sedation End 0801       ESTIMATED BLOOD LOSS: None    COMPLICATIONS: None    TECHNIQUE: Time-out was performed to identify the patient and procedure to be performed. With the patient laying in a prone position, the surgical area was prepped and draped in the usual sterile fashion using ChloraPrep and a fenestrated drape. The level was determined under fluoroscopy guidance. Skin anesthesia was achieved by injecting Lidocaine 2% over the injection site.  The interlaminar space was then approached with a 20  gauge, 3.5 inch Tuohy needle that was introduced under fluoroscopic guidance with AP, lateral and/or contralateral oblique imaging. Once the Ligamentum flavum was encountered loss of resistance to saline was used to enter the epidural space. With positive loss of resistance and negative aspiration for CSF or Blood, contrast dye  Omnipaque (240mg/mL) was injected to confirm placement and there was no vascular runoff. Then 3 mL of the medication mixture listed above was then injected slowly. Displacement of the radio opaque contrast after injection of the medication confirmed that the medication went into the area of the epidural space. The needles were removed, and bleeding was nil. A sterile dressing was applied. No specimens collected. The patient tolerated the procedure well.     The patient was monitored after the procedure in the recovery area. They were given post-procedure and discharge instructions to follow at home. The patient was discharged in a stable condition.    Spring Jensen MD

## 2023-12-08 NOTE — H&P
HPI  Bhavna Landry presents for Procedure(s):  C6-7 SOPHY    Recent anticoagulation/antiplatelets reviewed and verified with nursing.  Recent antibiotics/recent infections reviewed and verified with nursing.    Past Medical History:   Diagnosis Date    Allergy     Amblyopia     Anemia     Anticoagulant long-term use     Arthritis 1992    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, estrogen receptor positive 2022    Cataract     Depression     Dry eyes     Dry mouth     Duane's syndrome of right eye     Early dry stage nonexudative age-related macular degeneration of both eyes 2022    Fibromyalgia 2014    Fibromyalgia     Fractured hip     RIGHT HIP    GERD (gastroesophageal reflux disease)     History of psychiatric hospitalization     Hyperlipidemia 1992    Hypertension     Hypocalcemia     Hyponatremia     Kidney stone     Migraine headache     Osteoporosis     Pressure ulcer of unspecified site, unspecified stage     Psoriatic arthritis 1992    Right knee pain     post knee replacement surgery (possible rejectiion of metal)    RLS (restless legs syndrome)     Schizophrenia 1992    stable on meds    Sciatica     Squamous cell carcinoma of skin     Urinary tract infection      Past Surgical History:   Procedure Laterality Date    brow ptosis repair Right 2019    Surgeon: Dr. Karla Henson    CATARACT EXTRACTION       SECTION      COLONOSCOPY N/A 2016    Procedure: COLONOSCOPY;  Surgeon: ANDRIA Connelly MD;  Location: [address]);  Service: Endoscopy;  Laterality: N/A;    COLONOSCOPY N/A 2022    Procedure: COLONOSCOPY;  Surgeon: Mikey Walters MD;  Location: [facility];  Service: Endoscopy;  Laterality: N/A;    cyst removed from right sinus  1982    EPIDURAL STEROID INJECTION INTO LUMBAR SPINE N/A 2023    Procedure: Injection-steroid-epidural-lumbar L5-S1;  Surgeon: Chirag Kim MD;  Location: [facility];   Service: Pain Management;  Laterality: N/A;  asa    ESOPHAGOGASTRODUODENOSCOPY N/A 2/7/2023    Procedure: EGD (ESOPHAGOGASTRODUODENOSCOPY);  Surgeon: Dougie Shea MD;  Location: [facility] ENDO;  Service: Endoscopy;  Laterality: N/A;    HYSTERECTOMY      VAGINAL HYSTERECTOMY WITHOUT BSO - ENDOMETRIOSIS    INJECTION FOR SENTINEL NODE IDENTIFICATION Right 5/9/2022    Procedure: INJECTION, FOR SENTINEL NODE IDENTIFICATION-Right;  Surgeon: NEAL Dhillon MD;  Location: [facility] OR;  Service: General;  Laterality: Right;    INJECTION OF ANESTHETIC AGENT AROUND MEDIAL BRANCH NERVES INNERVATING LUMBAR FACET JOINT N/A 4/11/2019    Procedure: Lumbo-sacral Block, DR5 and Lateral Branches of S1,S2, S3;  Surgeon: Michelle Pineda Jr., MD;  Location: [facility] PAIN [facility]GT;  Service: Pain Management;  Laterality: N/A;  Pt takes  and states she holds ASA on her own whenever she has procedures.  Instructed to hold x 3 days prior to procedure.      INJECTION OF ANESTHETIC AGENT AROUND MEDIAL BRANCH NERVES INNERVATING LUMBAR FACET JOINT Bilateral 5/3/2023    Procedure: Block-nerve-medial branch-lumbar bilateral L3, L4, L5;  Surgeon: Maile Storm DO;  Location: [facility] PAIN [facility]GT;  Service: Pain Management;  Laterality: Bilateral;  asa    INJECTION OF ANESTHETIC AGENT INTO SACROILIAC JOINT Right 8/30/2018    Procedure: BLOCK, SACROILIAC JOINT-Right- ORAL SEDATION;  Surgeon: Michelle Pineda Jr., MD;  Location: [facility] PAIN [facility]GT;  Service: Pain Management;  Laterality: Right;    INJECTION OF ANESTHETIC AGENT INTO SACROILIAC JOINT Bilateral 9/27/2018    Procedure: BLOCK, SACROILIAC JOINT-BILATERAL;  Surgeon: Michelle Pineda Jr., MD;  Location: [facility] PAIN [facility]GT;  Service: Pain Management;  Laterality: Bilateral;  Please keep at 10:00 due to trasnsportation    INJECTION OF ANESTHETIC AGENT INTO SACROILIAC JOINT Bilateral 2/7/2019    Procedure: Bilateral Sacroiliac Joint Injection - Per Dr Pineda, not necessary to hold ASA.;  Surgeon: Michelle MERRITT  Edwin Bergman MD;  Location: [facility];  Service: Pain Management;  Laterality: Bilateral;    INTRAOCULAR PROSTHESES INSERTION Right 8/1/2019    Procedure: INSERTION, IOL PROSTHESIS;  Surgeon: Karen Song MD;  Location: [facility] OR [address] [place] [address];  Service: Ophthalmology;  Laterality: Right;    INTRAOCULAR PROSTHESES INSERTION Left 9/26/2019    Procedure: INSERTION, IOL PROSTHESIS;  Surgeon: Karen Song MD;  Location: [facility] OR [address] [place] [address];  Service: Ophthalmology;  Laterality: Left;    JOINT REPLACEMENT Right     knee    KNEE SURGERY      MASTECTOMY Right 5/9/2022    Procedure: MASTECTOMY-Right;  Surgeon: NEAL Dhillon MD;  Location: [facility];  Service: General;  Laterality: Right;  2.5 HOURS    PHACOEMULSIFICATION OF CATARACT Right 8/1/2019    Procedure: PHACOEMULSIFICATION, CATARACT;  Surgeon: Karen Song MD;  Location: [facility] OR [address] [place] [address];  Service: Ophthalmology;  Laterality: Right;    PHACOEMULSIFICATION OF CATARACT Left 9/26/2019    Procedure: PHACOEMULSIFICATION, CATARACT;  Surgeon: Karen Song MD;  Location: [facility] OR [address] [place] [address];  Service: Ophthalmology;  Laterality: Left;    RADIOFREQUENCY THERMOCOAGULATION Right 5/7/2019    Procedure: RADIOFREQUENCY THERMAL COAGULATION RIGHT DORSAL RAMUS 5 AND LATERAL BRANCH OF S1, S2 AND S3;  Surgeon: Michelle Pineda Jr., MD;  Location: [facility];  Service: Pain Management;  Laterality: Right;    RADIOFREQUENCY THERMOCOAGULATION Left 5/14/2019    Procedure: RADIOFREQUENCY THERMAL COAGULATION LEFT DORSAL RAMUS 5 AND LATERAL BRANCH OF S1,S2 AND S3;  Surgeon: Michelle Pineda Jr., MD;  Location: [facility] PAIN T;  Service: Pain Management;  Laterality: Left;    RADIOFREQUENCY THERMOCOAGULATION Right 10/22/2019    Procedure: RADIOFREQUENCY THERMAL COAGULATION---DARSAL RAMUS 5 and LATERAL S1,S2,and S3 Right;  Surgeon: Michelle Pineda Jr., MD;  Location: [facility] PAIN [facility];  Service: Pain Management;  Laterality: Right;  patient to sign consent DOS    RADIOFREQUENCY  "THERMOCOAGULATION Left 10/29/2019    Procedure: RADIOFREQUENCY THERMAL COAGULATION - LEFT - DR5, S1,S2, AND S3;  Surgeon: Michelle Pineda Jr., MD;  Location: [facility];  Service: Pain Management;  Laterality: Left;    RADIOFREQUENCY THERMOCOAGULATION Left 5/26/2020    Procedure: RADIOFREQUENCY THERMAL COAGULATION--Left DR5+ lateral branches of S1, S2, S3;  Surgeon: Michelle Pineda Jr., MD;  Location: [facility] PAIN [facility];  Service: Pain Management;  Laterality: Left;    RADIOFREQUENCY THERMOCOAGULATION Right 6/2/2020    Procedure: RADIOFREQUENCY THERMAL COAGULATION--Right DR5+ lateral branches of S1, S2, S3;  Surgeon: Michelle Pineda Jr., MD;  Location: [facility];  Service: Pain Management;  Laterality: Right;    SENTINEL LYMPH NODE BIOPSY Right 5/9/2022    Procedure: BIOPSY, LYMPH NODE, SENTINEL-Right;  Surgeon: NEAL Dhillon MD;  Location: [facility];  Service: General;  Laterality: Right;    Surgery on right knee  07/09/1982    tumor removed from back left side upper shoulder  03/17/2006     Review of patient's allergies indicates:   Allergen Reactions    Etanercept Other (See Comments)     Other reaction(s): recurrent infections    Chloramphenicol sod succinate Hives    Codeine Other (See Comments)     Other reaction(s): Stomach upset. Pt states OK with Percocet    Nickel sutures [surgical stainless steel] Dermatitis     Allergic contact dermatitis    Adhesive Rash        PMHx, PSHx, Allergies, Medications reviewed in epic      ROS negative except pain complaints in HPI    OBJECTIVE:    BP (!) 140/66 (BP Location: Left arm, Patient Position: Lying)   Pulse 70   Temp 97.9 °F (36.6 °C) (Temporal)   Resp 18   Ht 5' 2" (1.575 m)   Wt 46.7 kg (103 lb)   SpO2 98%   BMI 18.84 kg/m²     PHYSICAL EXAMINATION:    GENERAL: Well appearing, in no acute distress, alert and oriented to name, situation, location.  PSYCH:  Mood and affect appropriate.  SKIN: Skin color, texture, turgor normal, no rashes or lesions " at site of procedure.  CV: regular rate with palpation of the radial artery.  PULM: No evidence of respiratory difficulty, symmetric chest rise. No audible abnormal respiratory sounds.  NEURO: Cranial nerves grossly intact.    Plan:    Proceed with procedure as planned    Spring Jensen  12/08/2023

## 2023-12-08 NOTE — DISCHARGE SUMMARY
Discharge Note  Short Stay      SUMMARY     Admit Date: 12/8/2023    Attending Physician: Spring Jensen MD PhD    Discharge Physician: Spring Jensen      Discharge Date: 12/8/2023 8:04 AM    Procedure(s) (LRB):  C6-7 SOPHY (N/A)    Final Diagnosis: Cervical radiculopathy [M54.12]    Disposition: Home or self care    Patient Instructions:   Current Discharge Medication List        CONTINUE these medications which have NOT CHANGED    Details   albuterol (PROVENTIL/VENTOLIN HFA) 90 mcg/actuation inhaler USE 2 INHALATIONS BY MOUTH  4 TIMES DAILY AS NEEDED  Qty: 34 g, Refills: 3    Comments: Requesting 1 year supply      amLODIPine (NORVASC) 5 MG tablet Take 5 mg by mouth 2 (two) times a day.      ascorbic acid, vitamin C, (VITAMIN C) 500 MG tablet Take 500 mg by mouth once daily.      aspirin (ECOTRIN) 81 MG EC tablet Take 81 mg by mouth once daily.      atorvastatin (LIPITOR) 80 MG tablet Take 1 tablet (80 mg total) by mouth once daily.  Qty: 90 tablet, Refills: 3    Associated Diagnoses: Hyperlipidemia, unspecified hyperlipidemia type      baclofen (LIORESAL) 20 MG tablet 1 tab po tid for muscle cramps.  Qty: 90 tablet, Refills: 2      benztropine (COGENTIN) 0.5 MG tablet Take 1 tablet (0.5 mg total) by mouth 2 (two) times daily.  Qty: 180 tablet, Refills: 3    Associated Diagnoses: Extrapyramidal symptom      budesonide-formoterol 80-4.5 mcg (SYMBICORT) 80-4.5 mcg/actuation HFAA Inhale 2 puffs into the lungs 2 (two) times a day.  Qty: 6.9 g, Refills: 11      ciclopirox (PENLAC) 8 % Soln Apply topically nightly. Paint on affected nail(s) once per night, remove residue once per week with alcohol  Qty: 6.6 mL, Refills: 3    Associated Diagnoses: Tinea unguium      cloNIDine (CATAPRES) 0.1 MG tablet 1 tab po q day for systolic BP > 160 or DBP >100.  Qty: 30 tablet, Refills: 1    Comments: .      diclofenac (VOLTAREN) 25 MG TbEC Take 1 tablet (25 mg total) by mouth 2 (two) times daily.  Qty: 30 tablet, Refills: 0    Associated  Diagnoses: Injury of left foot, initial encounter      diclofenac sodium (VOLTAREN) 1 % Gel Apply 2 g topically once daily.  Qty: 200 g, Refills: 0      exemestane (AROMASIN) 25 mg tablet Take 1 tablet (25 mg total) by mouth once daily.  Qty: 30 tablet, Refills: 11    Associated Diagnoses: 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, estrogen receptor positive      fluorouraciL (EFUDEX) 5 % cream Use hs for 2 weeks  Qty: 40 g, Refills: 3    Associated Diagnoses: Multiple actinic keratoses      fluticasone (VERAMYST) 27.5 mcg/actuation nasal spray 2 sprays by Nasal route as needed for Rhinitis.      fluticasone propionate (FLONASE) 50 mcg/actuation nasal spray 2 sprays (100 mcg total) by Each Nostril route once daily.  Qty: 16 g, Refills: 2    Associated Diagnoses: Chronic rhinitis      folic acid (FOLVITE) 1 MG tablet Take 1 tablet (1,000 mcg total) by mouth once daily.  Qty: 90 tablet, Refills: 3    Comments: Requesting 1 year supply  Associated Diagnoses: Psoriatic arthritis      !! gabapentin (NEURONTIN) 100 MG capsule Take 1 capsule (100 mg total) by mouth 2 (two) times daily as needed (anxiety).  Qty: 180 capsule, Refills: 3    Associated Diagnoses: Anxiety      !! gabapentin (NEURONTIN) 300 MG capsule Take 1 capsule (300 mg total) by mouth every evening.  Qty: 90 capsule, Refills: 3    Associated Diagnoses: Anxiety; Insomnia, unspecified type; Restless legs      hydrOXYzine HCL (ATARAX) 25 MG tablet Take 1 tablet (25 mg total) by mouth 2 (two) times daily as needed for Anxiety.  Qty: 60 tablet, Refills: 2    Associated Diagnoses: Anxiety      isosorbide mononitrate (IMDUR) 30 MG 24 hr tablet TAKE 1 TABLET(30 MG) BY MOUTH EVERY DAY  Qty: 30 tablet, Refills: 11      methotrexate 2.5 MG Tab Take 8 tablets (20 mg total) by mouth every 7 days. Take 4 tabs Mon am and 4 tabs Mon pm only  Qty: 96 tablet, Refills: 0    Associated Diagnoses: Psoriatic arthritis      multivitamin (THERAGRAN) per tablet  Take 1 tablet by mouth once daily.      omega-3 fatty acids/fish oil (FISH OIL-OMEGA-3 FATTY ACIDS) 300-1,000 mg capsule Take by mouth once daily.      !! risperiDONE (RISPERDAL) 2 MG tablet TAKE 1 TABLET(2 MG) BY MOUTH EVERY MORNING  Qty: 90 tablet, Refills: 3    Associated Diagnoses: Paranoid schizophrenia      !! risperiDONE (RISPERDAL) 4 MG tablet Take 1 tablet (4 mg total) by mouth every evening.  Qty: 90 tablet, Refills: 3    Associated Diagnoses: Paranoid schizophrenia      venlafaxine (EFFEXOR-XR) 150 MG Cp24 Take 1 capsule (150 mg total) by mouth once daily.  Qty: 90 capsule, Refills: 3    Associated Diagnoses: Anxiety; Recurrent major depressive disorder, in full remission      VITAMIN A ORAL Take by mouth.      vitamin D (VITAMIN D3) 1000 units Tab Take 1,000 Units by mouth once daily.      vitamin E 200 UNIT capsule Take 200 Units by mouth once daily.      zinc gluconate 50 mg tablet Take 50 mg by mouth once daily.      cyanocobalamin 500 MCG tablet Take 500 mcg by mouth once daily.      omeprazole (PRILOSEC) 40 MG capsule Take 1 capsule (40 mg total) by mouth every morning.  Qty: 90 capsule, Refills: 3    Associated Diagnoses: Gastroesophageal reflux disease, unspecified whether esophagitis present      traMADoL (ULTRAM) 50 mg tablet Take 1 tablet (50 mg total) by mouth every 6 (six) hours as needed for Pain.  Qty: 30 each, Refills: 0    Comments: Quantity prescribed more than 7 day supply? Yes, quantity medically necessary  Associated Diagnoses: 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, estrogen receptor positive       !! - Potential duplicate medications found. Please discuss with provider.              Discharge Diagnosis: Cervical radiculopathy [M54.12]  Condition on Discharge: Stable with no complications to procedure   Diet on Discharge: Same as before.  Activity: as per instruction sheet.  Discharge to: Home with a responsible adult.  Follow up: 2-4 weeks       Please call my  office or pager at [phone] if experienced any weakness or loss of sensation, fever > 101.5, pain uncontrolled with oral medications, persistent nausea/vomiting/or diarrhea, redness or drainage from the incisions, or any other worrisome concerns. If physician on call was not reached or could not communicate with our office for any reason please go to the nearest emergency department      Spring Jensen MD PhD

## 2023-12-08 NOTE — DISCHARGE INSTRUCTIONS
Home Care Instructions Pain Management:     1.  DIET:     You may resume your normal diet today.     2.  BATHING:     You may shower with luke warm water.     3.  DRESSING:     You may remove your bandage today.     4.  ACTIVITY LEVEL:       You may resume your normal activities 24 hours after your procedure.     5.  MEDICATIONS:     You may resume your normal medications today.     6.  SPECIAL INSTRUCTIONS:     No heat to the injection site for 24 hours including bath or shower, heating pad, moist heat or hot tubs.     Use an ice pack to the injection site for any pain or discomfort.  Apply ice packs for 20 minute intervals as needed.     If you have received any sedatives by mouth today, you can not drive for 12 hours.     If you have received sedation through an IV, you can not drive for 24 hours.     PLEASE CALL YOUR DOCTOR FOR THE FOLLOWIN.  Redness or swelling around the injection site.  2.  Fever of 101 degrees.  3.  Drainage (pus) from the injection site.  4.  For any continuous bleeding (some dried blood over the incision is normal.)     FOR EMERGENCIES:     If any unusual problems or difficulties occur during clinic hours, call [phone] or dial [phone].     Follow up with with your physician in 2-3 weeks.

## 2023-12-12 ENCOUNTER — OFFICE VISIT (OUTPATIENT)
Dept: DERMATOLOGY | Facility: CLINIC | Age: 68
End: 2023-12-12
Payer: MEDICARE

## 2023-12-12 ENCOUNTER — INFUSION (OUTPATIENT)
Dept: INFUSION THERAPY | Facility: HOSPITAL | Age: 68
End: 2023-12-12
Attending: INTERNAL MEDICINE
Payer: MEDICARE

## 2023-12-12 VITALS — WEIGHT: 103 LBS | BODY MASS INDEX: 18.84 KG/M2

## 2023-12-12 VITALS
OXYGEN SATURATION: 96 % | WEIGHT: 103 LBS | HEART RATE: 77 BPM | SYSTOLIC BLOOD PRESSURE: 123 MMHG | DIASTOLIC BLOOD PRESSURE: 58 MMHG | TEMPERATURE: 98 F | BODY MASS INDEX: 18.95 KG/M2 | RESPIRATION RATE: 18 BRPM | HEIGHT: 62 IN

## 2023-12-12 DIAGNOSIS — M72.1 KNUCKLE PADS: ICD-10-CM

## 2023-12-12 DIAGNOSIS — T14.8XXA ABRASION: Primary | ICD-10-CM

## 2023-12-12 DIAGNOSIS — L57.0 MULTIPLE ACTINIC KERATOSES: ICD-10-CM

## 2023-12-12 DIAGNOSIS — Z85.828 HISTORY OF SKIN CANCER: ICD-10-CM

## 2023-12-12 DIAGNOSIS — D50.0 IRON DEFICIENCY ANEMIA DUE TO CHRONIC BLOOD LOSS: Primary | ICD-10-CM

## 2023-12-12 PROCEDURE — 3008F PR BODY MASS INDEX (BMI) DOCUMENTED: ICD-10-PCS | Mod: CPTII,S$GLB,, | Performed by: DERMATOLOGY

## 2023-12-12 PROCEDURE — 4010F ACE/ARB THERAPY RXD/TAKEN: CPT | Mod: CPTII,S$GLB,, | Performed by: DERMATOLOGY

## 2023-12-12 PROCEDURE — 99214 PR OFFICE/OUTPT VISIT, EST, LEVL IV, 30-39 MIN: ICD-10-PCS | Mod: 25,S$GLB,, | Performed by: DERMATOLOGY

## 2023-12-12 PROCEDURE — 3008F BODY MASS INDEX DOCD: CPT | Mod: CPTII,S$GLB,, | Performed by: DERMATOLOGY

## 2023-12-12 PROCEDURE — 17000 DESTRUCT PREMALG LESION: CPT | Mod: S$GLB,,, | Performed by: DERMATOLOGY

## 2023-12-12 PROCEDURE — 1159F PR MEDICATION LIST DOCUMENTED IN MEDICAL RECORD: ICD-10-PCS | Mod: CPTII,S$GLB,, | Performed by: DERMATOLOGY

## 2023-12-12 PROCEDURE — 1126F AMNT PAIN NOTED NONE PRSNT: CPT | Mod: CPTII,S$GLB,, | Performed by: DERMATOLOGY

## 2023-12-12 PROCEDURE — 96366 THER/PROPH/DIAG IV INF ADDON: CPT

## 2023-12-12 PROCEDURE — 99999 PR PBB SHADOW E&M-EST. PATIENT-LVL II: CPT | Mod: PBBFAC,,, | Performed by: DERMATOLOGY

## 2023-12-12 PROCEDURE — 4010F PR ACE/ARB THEARPY RXD/TAKEN: ICD-10-PCS | Mod: CPTII,S$GLB,, | Performed by: DERMATOLOGY

## 2023-12-12 PROCEDURE — 99214 OFFICE O/P EST MOD 30 MIN: CPT | Mod: 25,S$GLB,, | Performed by: DERMATOLOGY

## 2023-12-12 PROCEDURE — 1160F RVW MEDS BY RX/DR IN RCRD: CPT | Mod: CPTII,S$GLB,, | Performed by: DERMATOLOGY

## 2023-12-12 PROCEDURE — A4216 STERILE WATER/SALINE, 10 ML: HCPCS | Performed by: INTERNAL MEDICINE

## 2023-12-12 PROCEDURE — 25000003 PHARM REV CODE 250: Performed by: INTERNAL MEDICINE

## 2023-12-12 PROCEDURE — 96365 THER/PROPH/DIAG IV INF INIT: CPT

## 2023-12-12 PROCEDURE — 17000 PR DESTRUCTION(LASER SURGERY,CRYOSURGERY,CHEMOSURGERY),PREMALIGNANT LESIONS,FIRST LESION: ICD-10-PCS | Mod: S$GLB,,, | Performed by: DERMATOLOGY

## 2023-12-12 PROCEDURE — 17003 DESTRUCTION, PREMALIGNANT LESIONS; SECOND THROUGH 14 LESIONS: ICD-10-PCS | Mod: S$GLB,,, | Performed by: DERMATOLOGY

## 2023-12-12 PROCEDURE — 17003 DESTRUCT PREMALG LES 2-14: CPT | Mod: S$GLB,,, | Performed by: DERMATOLOGY

## 2023-12-12 PROCEDURE — 1160F PR REVIEW ALL MEDS BY PRESCRIBER/CLIN PHARMACIST DOCUMENTED: ICD-10-PCS | Mod: CPTII,S$GLB,, | Performed by: DERMATOLOGY

## 2023-12-12 PROCEDURE — 1159F MED LIST DOCD IN RCRD: CPT | Mod: CPTII,S$GLB,, | Performed by: DERMATOLOGY

## 2023-12-12 PROCEDURE — 63600175 PHARM REV CODE 636 W HCPCS: Performed by: INTERNAL MEDICINE

## 2023-12-12 PROCEDURE — 99999 PR PBB SHADOW E&M-EST. PATIENT-LVL II: ICD-10-PCS | Mod: PBBFAC,,, | Performed by: DERMATOLOGY

## 2023-12-12 PROCEDURE — 1126F PR PAIN SEVERITY QUANTIFIED, NO PAIN PRESENT: ICD-10-PCS | Mod: CPTII,S$GLB,, | Performed by: DERMATOLOGY

## 2023-12-12 RX ORDER — HEPARIN 100 UNIT/ML
500 SYRINGE INTRAVENOUS
OUTPATIENT
Start: 2023-12-19

## 2023-12-12 RX ORDER — DIPHENHYDRAMINE HYDROCHLORIDE 50 MG/ML
50 INJECTION INTRAMUSCULAR; INTRAVENOUS ONCE AS NEEDED
OUTPATIENT
Start: 2023-12-19

## 2023-12-12 RX ORDER — DIPHENHYDRAMINE HYDROCHLORIDE 50 MG/ML
50 INJECTION INTRAMUSCULAR; INTRAVENOUS ONCE AS NEEDED
Status: DISCONTINUED | OUTPATIENT
Start: 2023-12-12 | End: 2023-12-12 | Stop reason: HOSPADM

## 2023-12-12 RX ORDER — EPINEPHRINE 0.3 MG/.3ML
0.3 INJECTION SUBCUTANEOUS ONCE AS NEEDED
Status: DISCONTINUED | OUTPATIENT
Start: 2023-12-12 | End: 2023-12-12 | Stop reason: HOSPADM

## 2023-12-12 RX ORDER — SODIUM CHLORIDE 0.9 % (FLUSH) 0.9 %
10 SYRINGE (ML) INJECTION
Status: DISCONTINUED | OUTPATIENT
Start: 2023-12-12 | End: 2023-12-12 | Stop reason: HOSPADM

## 2023-12-12 RX ORDER — SODIUM CHLORIDE 0.9 % (FLUSH) 0.9 %
10 SYRINGE (ML) INJECTION
Status: CANCELLED | OUTPATIENT
Start: 2023-12-19

## 2023-12-12 RX ORDER — EPINEPHRINE 0.3 MG/.3ML
0.3 INJECTION SUBCUTANEOUS ONCE AS NEEDED
OUTPATIENT
Start: 2023-12-19

## 2023-12-12 RX ADMIN — Medication 10 ML: at 03:12

## 2023-12-12 RX ADMIN — IRON SUCROSE 400 MG: 20 INJECTION, SOLUTION INTRAVENOUS at 12:12

## 2023-12-12 RX ADMIN — SODIUM CHLORIDE: 9 INJECTION, SOLUTION INTRAVENOUS at 12:12

## 2023-12-12 NOTE — PLAN OF CARE
Pt received IV Venofer 400mg infusion dose 2/2 . Pt tolerated it well. AVS given. Pt will follow up with MD. Discharged with no acute distress.

## 2023-12-12 NOTE — PROGRESS NOTES
Subjective:      Patient ID:  Bhavna Landry is a 68 y.o. female who presents for   Chief Complaint   Patient presents with    Lesion     Right middle finger     She hit her right third finger on a metal shelf over a week ago and has an abrasion.          Review of Systems   Constitutional:  Negative for fever, chills, weight loss, weight gain, fatigue, night sweats and malaise.   Skin:  Positive for wears hat. Negative for daily sunscreen use and activity-related sunscreen use.   Hematologic/Lymphatic: Bruises/bleeds easily.       Objective:   Physical Exam   Constitutional: She appears well-developed and well-nourished.   Neurological: She is alert and oriented to person, place, and time.   Psychiatric: She has a normal mood and affect.   Skin:                        Diagram Legend     Erythematous scaling macule/papule c/w actinic keratosis       Vascular papule c/w angioma      Pigmented verrucoid papule/plaque c/w seborrheic keratosis      Yellow umbilicated papule c/w sebaceous hyperplasia      Irregularly shaped tan macule c/w lentigo     1-2 mm smooth white papules consistent with Milia      Movable subcutaneous cyst with punctum c/w epidermal inclusion cyst      Subcutaneous movable cyst c/w pilar cyst      Firm pink to brown papule c/w dermatofibroma      Pedunculated fleshy papule(s) c/w skin tag(s)      Evenly pigmented macule c/w junctional nevus     Mildly variegated pigmented, slightly irregular-bordered macule c/w mildly atypical nevus      Flesh colored to evenly pigmented papule c/w intradermal nevus       Pink pearly papule/plaque c/w basal cell carcinoma      Erythematous hyperkeratotic cursted plaque c/w SCC      Surgical scar with no sign of skin cancer recurrence      Open and closed comedones      Inflammatory papules and pustules      Verrucoid papule consistent consistent with wart     Erythematous eczematous patches and plaques     Dystrophic onycholytic nail with subungual debris c/w  onychomycosis     Umbilicated papule    Erythematous-base heme-crusted tan verrucoid plaque consistent with inflamed seborrheic keratosis     Erythematous Silvery Scaling Plaque c/w Psoriasis     See annotation      Assessment / Plan:        Abrasion  Use eucrisa ug then  eucerin cream bid for moisturizer  Call if does not improve    Knuckle pads  No tx    Multiple actinic keratoses   Cryosurgery Procedure Note    Verbal consent from the patient is obtained and the patient is aware of the precancerous quality and need for treatment of these lesions. Liquid nitrogen cryosurgery is applied to the 2 actinic keratoses, as detailed in the physical exam, to produce a freeze injury.      History of skin cancers  Area(s) of previous NMSC evaluated with no signs of recurrence.  . No lesions suspicious for malignancy noted.    Recommend daily sun protection/avoidance and use of at least SPF 30, broad spectrum sunscreen (OTC drug).                Follow up in about 6 months (around 6/12/2024).

## 2023-12-14 ENCOUNTER — TELEPHONE (OUTPATIENT)
Dept: HEMATOLOGY/ONCOLOGY | Facility: CLINIC | Age: 68
End: 2023-12-14
Payer: MEDICARE

## 2023-12-14 NOTE — TELEPHONE ENCOUNTER
----- Message from Idania Barrett sent at 12/14/2023  2:51 PM CST -----  Regarding: call back  Contact: [phone]  Pt is calling to speak with someone in provider office is asking for a return call Pt states need to discuss next appt time for infusions  please call pt at  [phone] (home)

## 2023-12-21 ENCOUNTER — OFFICE VISIT (OUTPATIENT)
Dept: PODIATRY | Facility: CLINIC | Age: 68
End: 2023-12-21
Payer: MEDICARE

## 2023-12-21 VITALS
HEIGHT: 62 IN | WEIGHT: 102.94 LBS | DIASTOLIC BLOOD PRESSURE: 75 MMHG | HEART RATE: 80 BPM | SYSTOLIC BLOOD PRESSURE: 124 MMHG | TEMPERATURE: 98 F | BODY MASS INDEX: 18.94 KG/M2

## 2023-12-21 DIAGNOSIS — M79.675 PAIN OF TOES OF BOTH FEET: ICD-10-CM

## 2023-12-21 DIAGNOSIS — B35.1 TINEA UNGUIUM: Primary | ICD-10-CM

## 2023-12-21 DIAGNOSIS — M79.674 PAIN OF TOES OF BOTH FEET: ICD-10-CM

## 2023-12-21 PROCEDURE — 99999 PR PBB SHADOW E&M-EST. PATIENT-LVL IV: CPT | Mod: PBBFAC,,, | Performed by: STUDENT IN AN ORGANIZED HEALTH CARE EDUCATION/TRAINING PROGRAM

## 2023-12-21 PROCEDURE — 11720 ROUTINE FOOT CARE: ICD-10-PCS | Mod: S$GLB,,, | Performed by: STUDENT IN AN ORGANIZED HEALTH CARE EDUCATION/TRAINING PROGRAM

## 2023-12-21 PROCEDURE — 99999 PR PBB SHADOW E&M-EST. PATIENT-LVL IV: ICD-10-PCS | Mod: PBBFAC,,, | Performed by: STUDENT IN AN ORGANIZED HEALTH CARE EDUCATION/TRAINING PROGRAM

## 2023-12-21 PROCEDURE — 11720 DEBRIDE NAIL 1-5: CPT | Mod: S$GLB,,, | Performed by: STUDENT IN AN ORGANIZED HEALTH CARE EDUCATION/TRAINING PROGRAM

## 2023-12-21 PROCEDURE — 99499 UNLISTED E&M SERVICE: CPT | Mod: S$GLB,,, | Performed by: STUDENT IN AN ORGANIZED HEALTH CARE EDUCATION/TRAINING PROGRAM

## 2023-12-21 PROCEDURE — 99499 NO LOS: ICD-10-PCS | Mod: S$GLB,,, | Performed by: STUDENT IN AN ORGANIZED HEALTH CARE EDUCATION/TRAINING PROGRAM

## 2023-12-21 NOTE — PROCEDURES
"Routine Foot Care    Date/Time: 12/21/2023 10:30 AM    Performed by: Walt Zhang DPM  Authorized by: Walt Zhang DPM    Time out: Immediately prior to procedure a "time out" was called to verify the correct patient, procedure, equipment, support staff and site/side marked as required.    Consent Done?:  Yes (Verbal)  Hyperkeratotic Skin Lesions?: No      Nail Care Type:  Debride(Left 1st Toe, Left 2nd Toe, Right 1st Toe and Right 2nd Toe)  Patient tolerance:  Patient tolerated the procedure well with no immediate complications     Other nails trimmed as a courtesy.    Continue topical ciclopirox.     RTC in 3 months for routine foot care    "

## 2023-12-27 ENCOUNTER — PATIENT MESSAGE (OUTPATIENT)
Dept: PSYCHIATRY | Facility: CLINIC | Age: 68
End: 2023-12-27
Payer: MEDICARE

## 2023-12-27 DIAGNOSIS — F41.9 ANXIETY: ICD-10-CM

## 2023-12-27 DIAGNOSIS — F33.42 RECURRENT MAJOR DEPRESSIVE DISORDER, IN FULL REMISSION: ICD-10-CM

## 2023-12-28 ENCOUNTER — TELEPHONE (OUTPATIENT)
Dept: PSYCHIATRY | Facility: CLINIC | Age: 68
End: 2023-12-28
Payer: MEDICARE

## 2023-12-28 RX ORDER — VENLAFAXINE HYDROCHLORIDE 75 MG/1
75 CAPSULE, EXTENDED RELEASE ORAL DAILY
Qty: 90 CAPSULE | Refills: 3 | Status: SHIPPED | OUTPATIENT
Start: 2023-12-28

## 2024-01-16 RX ORDER — IBUPROFEN 600 MG/1
600 TABLET ORAL EVERY 6 HOURS PRN
Qty: 80 TABLET | Refills: 0 | Status: SHIPPED | OUTPATIENT
Start: 2024-01-16 | End: 2024-02-23

## 2024-01-17 ENCOUNTER — TELEPHONE (OUTPATIENT)
Dept: INTERNAL MEDICINE | Facility: CLINIC | Age: 69
End: 2024-01-17
Payer: MEDICARE

## 2024-01-17 NOTE — TELEPHONE ENCOUNTER
----- Message from Miriam Nelson sent at 1/17/2024 12:16 PM CST -----  Contact: Self  [phone]  Prescription refill request.  RX name and strength (copy/paste from chart):  ibuprofen (ADVIL,MOTRIN) 600 MG tablet   Directions (copy/paste from chart):    Is this a 30 day or 90 day RX:  80  Local pharmacy or mail order pharmacy:  local  Pharmacy name and phone # (copy/paste from chart):      [id number] - [place] [address] [facility] [address]  [address] [address]  Phone: [phone] Fax: [phone]    Additional information:

## 2024-01-17 NOTE — TELEPHONE ENCOUNTER
Returned pt call, explained that rx requested was call in by ortho to preferred pharmacy. Pt verbalzied understanding. No further needs at this time.

## 2024-01-22 ENCOUNTER — TELEPHONE (OUTPATIENT)
Dept: INTERNAL MEDICINE | Facility: CLINIC | Age: 69
End: 2024-01-22

## 2024-01-22 DIAGNOSIS — J44.9 CHRONIC OBSTRUCTIVE PULMONARY DISEASE, UNSPECIFIED COPD TYPE: Primary | ICD-10-CM

## 2024-01-22 DIAGNOSIS — J42 CHRONIC BRONCHITIS, UNSPECIFIED CHRONIC BRONCHITIS TYPE: ICD-10-CM

## 2024-01-22 RX ORDER — ALBUTEROL SULFATE 0.83 MG/ML
2.5 SOLUTION RESPIRATORY (INHALATION) EVERY 6 HOURS PRN
Qty: 90 EACH | Refills: 2 | Status: SHIPPED | OUTPATIENT
Start: 2024-01-22 | End: 2025-01-21

## 2024-01-22 NOTE — TELEPHONE ENCOUNTER
----- Message from Brenton Fields sent at 1/22/2024  3:34 PM CST -----  Contact: self  [phone]  Pt stated have breathing machine but med that go in machine is old and will like for you to order meds to help break up mucus in chest.      [id number] - [place] - [address] AT [facility] [address] [[id number]]    Please call and advise

## 2024-01-25 ENCOUNTER — HOSPITAL ENCOUNTER (EMERGENCY)
Facility: HOSPITAL | Age: 69
Discharge: HOME | End: 2024-01-25
Attending: EMERGENCY MEDICINE
Payer: MEDICARE

## 2024-01-25 VITALS
OXYGEN SATURATION: 96 % | TEMPERATURE: 98 F | HEART RATE: 80 BPM | HEIGHT: 62 IN | BODY MASS INDEX: 18.95 KG/M2 | WEIGHT: 103 LBS | RESPIRATION RATE: 16 BRPM | DIASTOLIC BLOOD PRESSURE: 72 MMHG | SYSTOLIC BLOOD PRESSURE: 150 MMHG

## 2024-01-25 DIAGNOSIS — J44.1 COPD EXACERBATION: Primary | ICD-10-CM

## 2024-01-25 DIAGNOSIS — R06.02 SHORTNESS OF BREATH: ICD-10-CM

## 2024-01-25 LAB
ALBUMIN SERPL BCP-MCNC: 3.9 G/DL (ref 3.5–5.2)
ALP SERPL-CCNC: 69 U/L (ref 55–135)
ALT SERPL W/O P-5'-P-CCNC: 12 U/L (ref 10–44)
ANION GAP SERPL CALC-SCNC: 12 MMOL/L (ref 8–16)
AST SERPL-CCNC: 21 U/L (ref 10–40)
BASOPHILS # BLD AUTO: 0.03 K/UL (ref 0–0.2)
BASOPHILS NFR BLD: 0.4 % (ref 0–1.9)
BILIRUB SERPL-MCNC: 0.1 MG/DL (ref 0.1–1)
BILIRUB UR QL STRIP: NEGATIVE
BNP SERPL-MCNC: 14 PG/ML (ref 0–99)
BUN SERPL-MCNC: 10 MG/DL (ref 8–23)
CALCIUM SERPL-MCNC: 9 MG/DL (ref 8.7–10.5)
CHLORIDE SERPL-SCNC: 104 MMOL/L (ref 95–110)
CLARITY UR: CLEAR
CO2 SERPL-SCNC: 24 MMOL/L (ref 23–29)
COLOR UR: COLORLESS
CREAT SERPL-MCNC: 0.8 MG/DL (ref 0.5–1.4)
DIFFERENTIAL METHOD BLD: ABNORMAL
EOSINOPHIL # BLD AUTO: 0.2 K/UL (ref 0–0.5)
EOSINOPHIL NFR BLD: 2.4 % (ref 0–8)
ERYTHROCYTE [DISTWIDTH] IN BLOOD BY AUTOMATED COUNT: 18.7 % (ref 11.5–14.5)
EST. GFR  (NO RACE VARIABLE): >60 ML/MIN/1.73 M^2
GLUCOSE SERPL-MCNC: 110 MG/DL (ref 70–110)
GLUCOSE UR QL STRIP: NEGATIVE
HCT VFR BLD AUTO: 40.6 % (ref 37–48.5)
HGB BLD-MCNC: 13.4 G/DL (ref 12–16)
HGB UR QL STRIP: NEGATIVE
IMM GRANULOCYTES # BLD AUTO: 0.02 K/UL (ref 0–0.04)
IMM GRANULOCYTES NFR BLD AUTO: 0.3 % (ref 0–0.5)
INFLUENZA A, MOLECULAR: NEGATIVE
INFLUENZA B, MOLECULAR: NEGATIVE
KETONES UR QL STRIP: NEGATIVE
LEUKOCYTE ESTERASE UR QL STRIP: NEGATIVE
LYMPHOCYTES # BLD AUTO: 0.9 K/UL (ref 1–4.8)
LYMPHOCYTES NFR BLD: 13.7 % (ref 18–48)
MCH RBC QN AUTO: 30.9 PG (ref 27–31)
MCHC RBC AUTO-ENTMCNC: 33 G/DL (ref 32–36)
MCV RBC AUTO: 94 FL (ref 82–98)
MONOCYTES # BLD AUTO: 0.2 K/UL (ref 0.3–1)
MONOCYTES NFR BLD: 3.2 % (ref 4–15)
NEUTROPHILS # BLD AUTO: 5.4 K/UL (ref 1.8–7.7)
NEUTROPHILS NFR BLD: 80 % (ref 38–73)
NITRITE UR QL STRIP: NEGATIVE
NRBC BLD-RTO: 0 /100 WBC
PH UR STRIP: 8 [PH] (ref 5–8)
PLATELET # BLD AUTO: 309 K/UL (ref 150–450)
PMV BLD AUTO: 8.3 FL (ref 9.2–12.9)
POTASSIUM SERPL-SCNC: 4.6 MMOL/L (ref 3.5–5.1)
PROT SERPL-MCNC: 7.1 G/DL (ref 6–8.4)
PROT UR QL STRIP: NEGATIVE
RBC # BLD AUTO: 4.33 M/UL (ref 4–5.4)
SARS-COV-2 RDRP RESP QL NAA+PROBE: NEGATIVE
SODIUM SERPL-SCNC: 140 MMOL/L (ref 136–145)
SP GR UR STRIP: 1 (ref 1–1.03)
SPECIMEN SOURCE: NORMAL
URN SPEC COLLECT METH UR: ABNORMAL
UROBILINOGEN UR STRIP-ACNC: NEGATIVE EU/DL
WBC # BLD AUTO: 6.78 K/UL (ref 3.9–12.7)

## 2024-01-25 PROCEDURE — 93005 ELECTROCARDIOGRAM TRACING: CPT

## 2024-01-25 PROCEDURE — 94640 AIRWAY INHALATION TREATMENT: CPT

## 2024-01-25 PROCEDURE — 85025 COMPLETE CBC W/AUTO DIFF WBC: CPT

## 2024-01-25 PROCEDURE — 83880 ASSAY OF NATRIURETIC PEPTIDE: CPT

## 2024-01-25 PROCEDURE — 87502 INFLUENZA DNA AMP PROBE: CPT

## 2024-01-25 PROCEDURE — 25000003 PHARM REV CODE 250: Performed by: EMERGENCY MEDICINE

## 2024-01-25 PROCEDURE — U0002 COVID-19 LAB TEST NON-CDC: HCPCS

## 2024-01-25 PROCEDURE — 93010 ELECTROCARDIOGRAM REPORT: CPT | Mod: ,,, | Performed by: INTERNAL MEDICINE

## 2024-01-25 PROCEDURE — 94761 N-INVAS EAR/PLS OXIMETRY MLT: CPT

## 2024-01-25 PROCEDURE — 63600175 PHARM REV CODE 636 W HCPCS

## 2024-01-25 PROCEDURE — 99285 EMERGENCY DEPT VISIT HI MDM: CPT | Mod: 25

## 2024-01-25 PROCEDURE — 81003 URINALYSIS AUTO W/O SCOPE: CPT

## 2024-01-25 PROCEDURE — 80053 COMPREHEN METABOLIC PANEL: CPT

## 2024-01-25 PROCEDURE — 25000242 PHARM REV CODE 250 ALT 637 W/ HCPCS

## 2024-01-25 PROCEDURE — 25000003 PHARM REV CODE 250

## 2024-01-25 RX ORDER — AMLODIPINE BESYLATE 5 MG/1
10 TABLET ORAL
Status: COMPLETED | OUTPATIENT
Start: 2024-01-25 | End: 2024-01-25

## 2024-01-25 RX ORDER — CLONIDINE HYDROCHLORIDE 0.1 MG/1
0.1 TABLET ORAL
Status: COMPLETED | OUTPATIENT
Start: 2024-01-25 | End: 2024-01-25

## 2024-01-25 RX ORDER — PREDNISONE 20 MG/1
50 TABLET ORAL DAILY
Qty: 13 TABLET | Refills: 0 | Status: SHIPPED | OUTPATIENT
Start: 2024-01-25 | End: 2024-01-30

## 2024-01-25 RX ORDER — IPRATROPIUM BROMIDE AND ALBUTEROL SULFATE 2.5; .5 MG/3ML; MG/3ML
3 SOLUTION RESPIRATORY (INHALATION)
Status: COMPLETED | OUTPATIENT
Start: 2024-01-25 | End: 2024-01-25

## 2024-01-25 RX ORDER — AMLODIPINE BESYLATE 5 MG/1
10 TABLET ORAL
Status: DISCONTINUED | OUTPATIENT
Start: 2024-01-25 | End: 2024-01-25 | Stop reason: HOSPADM

## 2024-01-25 RX ADMIN — AMLODIPINE BESYLATE 10 MG: 5 TABLET ORAL at 09:01

## 2024-01-25 RX ADMIN — PREDNISONE 50 MG: 20 TABLET ORAL at 10:01

## 2024-01-25 RX ADMIN — IPRATROPIUM BROMIDE AND ALBUTEROL SULFATE 3 ML: 2.5; .5 SOLUTION RESPIRATORY (INHALATION) at 10:01

## 2024-01-25 RX ADMIN — CLONIDINE HYDROCHLORIDE 0.1 MG: 0.1 TABLET ORAL at 09:01

## 2024-01-25 RX ADMIN — IPRATROPIUM BROMIDE AND ALBUTEROL SULFATE 3 ML: 2.5; .5 SOLUTION RESPIRATORY (INHALATION) at 11:01

## 2024-01-25 NOTE — ED PROVIDER NOTES
Encounter Date: 1/25/2024       History     Chief Complaint   Patient presents with    Shortness of Breath     That began this am, SOB at rest, pt has hx of COPD, no relief with nebs at home, + cough     Hypertension     Pt reports no taking HTN meds this am, + HA, denies n/v/vision changes, B/p in triage -201/85     Patient is a 68-year-old female with a past medical history of anemia, anticoagulant long-term use, arthritis, fibromyalgia, GERD, hyperlipidemia, hypertension, hypocalcemia, hyponatremia, migraine headache, osteoporosis, restless legs syndrome, schizophrenia, sciatica, and COPD who presents to emergency room for shortness of breath.  Patient states that symptoms started last night.  She has been unable to sleep.  Shortness of breath exacerbated with lying down flat.  She also endorses cough.  She has albuterol inhaler and nebulizer at home that has been unable to relieve symptoms.  Denies chest pain, nausea, vomiting, abdominal pain, fever, leg swelling, headache, or lightheadedness.    The history is provided by the patient. No  was used.     Review of patient's allergies indicates:   Allergen Reactions    Etanercept Other (See Comments)     Other reaction(s): recurrent infections    Chloramphenicol sod succinate Hives    Codeine Other (See Comments)     Other reaction(s): Stomach upset. Pt states OK with Percocet    Nickel sutures [surgical stainless steel] Dermatitis     Allergic contact dermatitis    Adhesive Rash     Past Medical History:   Diagnosis Date    Allergy     Amblyopia     Anemia     Anticoagulant long-term use     Arthritis 02/02/1992    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, estrogen receptor positive 04/13/2022    Cataract     Depression     Dry eyes     Dry mouth     Duane's syndrome of right eye     Early dry stage nonexudative age-related macular degeneration of both eyes 09/20/2022    Fibromyalgia 04/17/2014    Fibromyalgia     Fractured  hip     RIGHT HIP    GERD (gastroesophageal reflux disease)     History of psychiatric hospitalization     Hyperlipidemia 1992    Hypertension     Hypocalcemia     Hyponatremia     Kidney stone     Migraine headache     Osteoporosis     Pressure ulcer of unspecified site, unspecified stage     Psoriatic arthritis 1992    Right knee pain     post knee replacement surgery (possible rejectiion of metal)    RLS (restless legs syndrome)     Schizophrenia 1992    stable on meds    Sciatica     Squamous cell carcinoma of skin     Urinary tract infection      Past Surgical History:   Procedure Laterality Date    brow ptosis repair Right 2019    Surgeon: Dr. Karla Henson    CATARACT EXTRACTION       SECTION      COLONOSCOPY N/A 2016    Procedure: COLONOSCOPY;  Surgeon: ANDRIA Connelly MD;  Location: [facility] ([address] [place] [address]);  Service: Endoscopy;  Laterality: N/A;    COLONOSCOPY N/A 2022    Procedure: COLONOSCOPY;  Surgeon: Mikey Walters MD;  Location: [facility];  Service: Endoscopy;  Laterality: N/A;    cyst removed from right sinus  1982    EPIDURAL STEROID INJECTION INTO CERVICAL SPINE N/A 2023    Procedure: C6-7 SOPHY;  Surgeon: Spring Jensen MD;  Location: [facility];  Service: Pain Management;  Laterality: N/A;  20 mins    EPIDURAL STEROID INJECTION INTO LUMBAR SPINE N/A 2023    Procedure: Injection-steroid-epidural-lumbar L5-S1;  Surgeon: Chirag Kim MD;  Location: [facility];  Service: Pain Management;  Laterality: N/A;  asa    ESOPHAGOGASTRODUODENOSCOPY N/A 2023    Procedure: EGD (ESOPHAGOGASTRODUODENOSCOPY);  Surgeon: Dougie Shea MD;  Location: [facility];  Service: Endoscopy;  Laterality: N/A;    HYSTERECTOMY      VAGINAL HYSTERECTOMY WITHOUT BSO - ENDOMETRIOSIS    INJECTION FOR SENTINEL NODE IDENTIFICATION Right 2022    Procedure: INJECTION, FOR SENTINEL NODE IDENTIFICATION-Right;  Surgeon: NEAL Dhillon MD;  Location: [facility] [place]  OR;  Service: General;  Laterality: Right;    INJECTION OF ANESTHETIC AGENT AROUND MEDIAL BRANCH NERVES INNERVATING LUMBAR FACET JOINT N/A 4/11/2019    Procedure: Lumbo-sacral Block, DR5 and Lateral Branches of S1,S2, S3;  Surgeon: Michelle Pineda Jr., MD;  Location: [facility] PAIN [facility]GT;  Service: Pain Management;  Laterality: N/A;  Pt takes  and states she holds ASA on her own whenever she has procedures.  Instructed to hold x 3 days prior to procedure.      INJECTION OF ANESTHETIC AGENT AROUND MEDIAL BRANCH NERVES INNERVATING LUMBAR FACET JOINT Bilateral 5/3/2023    Procedure: Block-nerve-medial branch-lumbar bilateral L3, L4, L5;  Surgeon: Maile Storm DO;  Location: [facility]IN [facility]GT;  Service: Pain Management;  Laterality: Bilateral;  asa    INJECTION OF ANESTHETIC AGENT INTO SACROILIAC JOINT Right 8/30/2018    Procedure: BLOCK, SACROILIAC JOINT-Right- ORAL SEDATION;  Surgeon: Michelle Pineda Jr., MD;  Location: [facility] PAIN MG;  Service: Pain Management;  Laterality: Right;    INJECTION OF ANESTHETIC AGENT INTO SACROILIAC JOINT Bilateral 9/27/2018    Procedure: BLOCK, SACROILIAC JOINT-BILATERAL;  Surgeon: Michelle Pineda Jr., MD;  Location: [facility];  Service: Pain Management;  Laterality: Bilateral;  Please keep at 10:00 due to trasnsportation    INJECTION OF ANESTHETIC AGENT INTO SACROILIAC JOINT Bilateral 2/7/2019    Procedure: Bilateral Sacroiliac Joint Injection - Per Dr Pineda, not necessary to hold ASA.;  Surgeon: Michelle Pineda Jr., MD;  Location: [facility];  Service: Pain Management;  Laterality: Bilateral;    INTRAOCULAR PROSTHESES INSERTION Right 8/1/2019    Procedure: INSERTION, IOL PROSTHESIS;  Surgeon: Karen Song MD;  Location: [facility] OR [address] [place] [address];  Service: Ophthalmology;  Laterality: Right;    INTRAOCULAR PROSTHESES INSERTION Left 9/26/2019    Procedure: INSERTION, IOL PROSTHESIS;  Surgeon: Karen Song MD;  Location: [facility] OR [address] [place] [address];  Service: Ophthalmology;   Laterality: Left;    JOINT REPLACEMENT Right     knee    KNEE SURGERY      MASTECTOMY Right 5/9/2022    Procedure: MASTECTOMY-Right;  Surgeon: NEAL Dhillon MD;  Location: [facility];  Service: General;  Laterality: Right;  2.5 HOURS    PHACOEMULSIFICATION OF CATARACT Right 8/1/2019    Procedure: PHACOEMULSIFICATION, CATARACT;  Surgeon: Karen Song MD;  Location: [facility] OR [address] [place] [address];  Service: Ophthalmology;  Laterality: Right;    PHACOEMULSIFICATION OF CATARACT Left 9/26/2019    Procedure: PHACOEMULSIFICATION, CATARACT;  Surgeon: Karen Song MD;  Location: [facility] OR [address] [place] [address];  Service: Ophthalmology;  Laterality: Left;    RADIOFREQUENCY THERMOCOAGULATION Right 5/7/2019    Procedure: RADIOFREQUENCY THERMAL COAGULATION RIGHT DORSAL RAMUS 5 AND LATERAL BRANCH OF S1, S2 AND S3;  Surgeon: Michelle Pineda Jr., MD;  Location: [facility];  Service: Pain Management;  Laterality: Right;    RADIOFREQUENCY THERMOCOAGULATION Left 5/14/2019    Procedure: RADIOFREQUENCY THERMAL COAGULATION LEFT DORSAL RAMUS 5 AND LATERAL BRANCH OF S1,S2 AND S3;  Surgeon: Michelle Pineda Jr., MD;  Location: [facility] T;  Service: Pain Management;  Laterality: Left;    RADIOFREQUENCY THERMOCOAGULATION Right 10/22/2019    Procedure: RADIOFREQUENCY THERMAL COAGULATION---DARSAL RAMUS 5 and LATERAL S1,S2,and S3 Right;  Surgeon: Michelle Pineda Jr., MD;  Location: [facility];  Service: Pain Management;  Laterality: Right;  patient to sign consent DOS    RADIOFREQUENCY THERMOCOAGULATION Left 10/29/2019    Procedure: RADIOFREQUENCY THERMAL COAGULATION - LEFT - DR5, S1,S2, AND S3;  Surgeon: Michelle Pineda Jr., MD;  Location: [facility] PAIN T;  Service: Pain Management;  Laterality: Left;    RADIOFREQUENCY THERMOCOAGULATION Left 5/26/2020    Procedure: RADIOFREQUENCY THERMAL COAGULATION--Left DR5+ lateral branches of S1, S2, S3;  Surgeon: Michelle Pineda Jr., MD;  Location: [facility];  Service: Pain Management;  Laterality: Left;     RADIOFREQUENCY THERMOCOAGULATION Right 2020    Procedure: RADIOFREQUENCY THERMAL COAGULATION--Right DR5+ lateral branches of S1, S2, S3;  Surgeon: Michelle Pineda Jr., MD;  Location: [facility]IN [facility]GT;  Service: Pain Management;  Laterality: Right;    SENTINEL LYMPH NODE BIOPSY Right 2022    Procedure: BIOPSY, LYMPH NODE, SENTINEL-Right;  Surgeon: NEAL Dhillon MD;  Location: [facility] OR;  Service: General;  Laterality: Right;    Surgery on right knee  1982    tumor removed from back left side upper shoulder  2006     Family History   Problem Relation Age of Onset    Colon cancer Mother     Psoriasis Mother     Cancer Mother         colon    Depression Mother     Cancer Father         lymphoma    Stroke Sister     Diabetes Brother     Arthritis Brother     Heart disease Brother         cad    No Known Problems Daughter     Hypertension Neg Hx     Suicide Neg Hx     Amblyopia Neg Hx     Blindness Neg Hx     Cataracts Neg Hx     Glaucoma Neg Hx     Macular degeneration Neg Hx     Retinal detachment Neg Hx     Strabismus Neg Hx      Social History     Tobacco Use    Smoking status: Every Day     Current packs/day: 0.00     Average packs/day: 0.3 packs/day for 10.0 years (2.5 ttl pk-yrs)     Types: Cigarettes     Start date: 1/10/2013     Last attempt to quit: 1/10/2023     Years since quittin.0     Passive exposure: Never    Smokeless tobacco: Former    Tobacco comments:     about 5 cig a day   Substance Use Topics    Alcohol use: No    Drug use: No     Review of Systems   Constitutional:  Negative for chills, diaphoresis, fatigue and fever.   HENT:  Negative for congestion, sore throat and trouble swallowing.    Respiratory:  Positive for shortness of breath. Negative for cough.    Cardiovascular:  Negative for chest pain and palpitations.   Gastrointestinal:  Negative for abdominal pain, blood in stool, constipation, diarrhea, nausea and vomiting.   Genitourinary:  Negative for difficulty  urinating, dysuria, frequency and urgency.   Musculoskeletal:  Negative for back pain and myalgias.   Skin:  Negative for rash and wound.   Neurological:  Negative for weakness, light-headedness, numbness and headaches.       Physical Exam     Initial Vitals [01/25/24 0916]   BP Pulse Resp Temp SpO2   (!) 201/85 92 (!) 26 97.5 °F (36.4 °C) 97 %      MAP       --         Physical Exam    Nursing note and vitals reviewed.  Constitutional: She appears well-developed and well-nourished. She is not diaphoretic. She appears distressed (mild).   Patient is lying in bed, frail appearing.  Awake and alert.  Patient able to speak in complete sentences.  Maintaining airway well.   HENT:   Head: Normocephalic and atraumatic.   Right Ear: External ear normal.   Left Ear: External ear normal.   Eyes: Conjunctivae and EOM are normal. Pupils are equal, round, and reactive to light.   Neck: Neck supple.   Normal range of motion.  Cardiovascular:  Normal rate, regular rhythm and normal heart sounds.     Exam reveals no friction rub.       No murmur heard.  Pulmonary/Chest: She is in respiratory distress (mild).   No wheezing on auscultation.  Though, patient has tight airways.  Unable to deeply inspire without persistent productive cough.   Abdominal: Abdomen is soft. Bowel sounds are normal. She exhibits no distension. There is no abdominal tenderness. There is no rebound and no guarding.   Musculoskeletal:         General: No tenderness or edema. Normal range of motion.      Cervical back: Normal range of motion and neck supple.     Neurological: She is alert and oriented to person, place, and time. She has normal strength.   Skin: Skin is warm and dry.   Psychiatric: She has a normal mood and affect. Her behavior is normal. Thought content normal.         ED Course   Critical Care    Date/Time: 1/25/2024 3:53 PM    Performed by: Leslie Watt PA-C  Authorized by: Cristal Duarte MD  Direct patient critical care time: 25  minutes  Additional history critical care time: 10 minutes  Ordering / reviewing critical care time: 10 minutes  Documentation critical care time: 15 minutes  Total critical care time (exclusive of procedural time) : 60 minutes  Critical care time was exclusive of separately billable procedures and treating other patients and teaching time.  Critical care was necessary to treat or prevent imminent or life-threatening deterioration of the following conditions: respiratory failure.  Critical care was time spent personally by me on the following activities: blood draw for specimens, development of treatment plan with patient or surrogate, interpretation of cardiac output measurements, evaluation of patient's response to treatment, examination of patient, obtaining history from patient or surrogate, ordering and review of laboratory studies, ordering and review of radiographic studies, pulse oximetry, re-evaluation of patient's condition and review of old charts.        Labs Reviewed   CBC W/ AUTO DIFFERENTIAL - Abnormal; Notable for the following components:       Result Value    RDW 18.7 (*)     MPV 8.3 (*)     Lymph # 0.9 (*)     Mono # 0.2 (*)     Gran % 80.0 (*)     Lymph % 13.7 (*)     Mono % 3.2 (*)     All other components within normal limits   URINALYSIS, REFLEX TO URINE CULTURE - Abnormal; Notable for the following components:    Color, UA Colorless (*)     All other components within normal limits    Narrative:     Specimen Source->Urine   INFLUENZA A & B BY MOLECULAR   COMPREHENSIVE METABOLIC PANEL   B-TYPE NATRIURETIC PEPTIDE   SARS-COV-2 RNA AMPLIFICATION, QUAL        ECG Results              EKG 12-lead (In process)  Result time 01/25/24 09:26:14      In process by Interface, Lab In [facility] (01/25/24 09:26:14)                   Narrative:    Test Reason : R06.02,    Vent. Rate : 087 BPM     Atrial Rate : 087 BPM     P-R Int : 188 ms          QRS Dur : 072 ms      QT Int : 366 ms       P-R-T Axes : 076  -07 083 degrees     QTc Int : 440 ms    Normal sinus rhythm  Possible Left atrial enlargement  Anteroseptal infarct (cited on or before 30-MAR-2021)  Abnormal ECG  When compared with ECG of 25-APR-2022 10:23,  No significant change was found    Referred By: [email or web address]   SELF           Confirmed By:                                   Imaging Results              X-Ray Chest 1 View (Final result)  Result time 01/25/24 10:42:17      Final result by Capo Ruiz MD (01/25/24 10:42:17)                   Impression:      No acute cardiopulmonary disease and no significant interval change      Electronically signed by: Capo Ruiz MD  Date:    01/25/2024  Time:    10:42               Narrative:    EXAMINATION:  XR CHEST 1 VIEW    CLINICAL HISTORY:  shortness of breath;    TECHNIQUE:  Single frontal view of the chest was performed.    COMPARISON:  09/27/2022    FINDINGS:  Heart size and pulmonary vascularity are within normal limits.  Mild tortuosity of the thoracic aorta and brachiocephalic vessels with atherosclerotic calcification in the wall of the aortic arch.  Lungs are satisfactorily expanded.  Mild accentuation of the interstitial pattern bilaterally.  No airspace consolidation or pleural effusion.  No pneumothorax.  Skeletal structures appear intact.  Thoracolumbar scoliosis.                                       Medications   albuterol-ipratropium 2.5 mg-0.5 mg/3 mL nebulizer solution 3 mL (3 mLs Nebulization Given 1/25/24 1012)   cloNIDine tablet 0.1 mg (0.1 mg Oral Given 1/25/24 0932)   amLODIPine tablet 10 mg (10 mg Oral Given 1/25/24 0943)   albuterol-ipratropium 2.5 mg-0.5 mg/3 mL nebulizer solution 3 mL (3 mLs Nebulization Given 1/25/24 1101)   predniSONE tablet 50 mg (50 mg Oral Given 1/25/24 1045)     Medical Decision Making  Patient presents for shortness of breath.  Blood pressure initially 201/85 with respirations of 26.  O2 saturation of 97%.  Patient has not taken blood pressure medications yet today.   Vital signs otherwise stable.    Differential Diagnosis includes, but is not limited to PE, MI/ACS, pneumothorax, pericardial effusion/tamonade, pneumonia, lung abscess, pericarditis/myocarditis, pleural effusion, lung mass, CHF exacerbation, asthma exacerbation, COPD exacerbation, aspirated/ingested foreign body, airway obstruction, CO poisoning, anemia, metabolic derangement, allergy/atopy, influenza, viral URI, or other viral syndrome.  EKG unremarkable.  I do not suspect MI/ACS.  Chest x-ray without signs of pneumothorax, effusion, pneumonia, abscess, or mass.  Lab work relatively unremarkable.  COVID and influenza negative.  Clinical presentation and physical exam most suggestive of COPD exacerbation.  Multiple breathing treatments given with improvement of symptoms and presentation.  Patient given steroid in the emergency room.  Will prescribe patient short course of steroids to take upon discharge.    I see no indication of an emergent process beyond that addressed during our encounter. Patient stable for discharge at this time. I have counseled the patient regarding follow up with primary care physician and gave strict return precautions. I have discussed the final diagnosis and gave instructions regarding prescribed medications. Patient verbalized understanding and is agreeable.     Amount and/or Complexity of Data Reviewed  Labs: ordered. Decision-making details documented in ED Course.  Radiology: ordered. Decision-making details documented in ED Course.    Risk  Prescription drug management.               ED Course as of 01/25/24 1555   Thu Jan 25, 2024   1000 EKG 12-lead  Independent interpretation of EKG with normal sinus rhythm at 87 beats per minute.  No ST elevations.  No T-wave inversions.  No arrhythmias noted. [BJ]   1006 CBC Auto Differential(!)  CBC unremarkable.  No increase in white blood cells.  H/H within normal limits. [BJ]   1023 Brain natriuretic peptide  BNP within normal limits. [BJ]    1023 Comprehensive Metabolic Panel  CMP within normal limits. [BJ]   1023 Influenza A & B by Molecular  Influenza negative. [BJ]   1023 COVID-19 Rapid Screening  COVID negative. [BJ]   1034 On re-evaluation, patient states that she is feeling much better.  On auscultation, she is still tight with wet productive cough on deep inspiration. Will order prednisone dose and another duoneb.  [BJ]   1035 BP(!): 190/87  Blood pressure steadily decreasing. [BJ]   1056 Urinalysis, Reflex to Urine Culture Urine, Clean Catch(!)  UA unremarkable.  [BJ]   1057 X-Ray Chest 1 View  Impression:     No acute cardiopulmonary disease and no significant interval change [BJ]   1206 On re-evaluation, patient lungs clear to auscultation.  Mild cough, but much improved from initial presentation.  Will plan for discharge. [BJ]      ED Course User Index  [BJ] Leslie Watt PA-C                           Clinical Impression:  Final diagnoses:  [R06.02] Shortness of breath  [J44.1] COPD exacerbation (Primary)          ED Disposition Condition    Discharge Stable          ED Prescriptions       Medication Sig Dispense Start Date End Date Auth. Provider    predniSONE (DELTASONE) 20 MG tablet Take 2.5 tablets (50 mg total) by mouth once daily. for 5 days 13 tablet 1/25/2024 1/30/2024 Leslie Watt PA-C          Follow-up Information       Follow up With Specialties Details Why Contact Info    Sadaf Vargas MD Internal Medicine Schedule an appointment as soon as possible for a visit   [address]  [place] [address]  [phone]               Leslie Watt PA-C  01/25/24 [id number]

## 2024-01-25 NOTE — DISCHARGE INSTRUCTIONS
Please take steroids as prescribed for your COPD exacerbation.  Your blood pressure looks great!  Continue taking your blood pressure medications as prescribed.  Also continue using your DuoNebs at home for shortness of breath.    Thank you for allowing me and my emergency team to take care of you here today! I hope you feel better soon. Please do not hesitate to return with any additional concerns that may arise from this or any new problem you encounter.    Our goal in the emergency department is to always give you outstanding care and exceptional service. If you receive a survey by mail or e-mail in the next week regarding your experience in our ED, we would greatly appreciate you completing it. Your feedback provides us with a way to recognize our staff who give very good care and it helps us learn how to improve when your experience was below the excellence we aspire to be!    Brook Juneau, PA-C [facility] [place]   Emergency Room Physician Assistant

## 2024-01-26 ENCOUNTER — PATIENT OUTREACH (OUTPATIENT)
Dept: EMERGENCY MEDICINE | Facility: HOSPITAL | Age: 69
End: 2024-01-26
Payer: MEDICARE

## 2024-01-26 ENCOUNTER — TELEPHONE (OUTPATIENT)
Dept: INTERNAL MEDICINE | Facility: CLINIC | Age: 69
End: 2024-01-26
Payer: MEDICARE

## 2024-01-26 NOTE — TELEPHONE ENCOUNTER
----- Message from Ana Lorenzana sent at 1/26/2024  4:46 PM CST -----  Contact: [phone]  [email or web address]     Patient is calling for Medical Advice regarding:needs ED follow up     How long has patient had these symptoms:    Pharmacy name and phone#:    Would like response via [employer]:  no     Comments:  Needs a follow up from the ED high blood pressure and trouble breathing they let her go home but told her to follow up with the PCP

## 2024-01-29 NOTE — PROGRESS NOTES
Pt is scheduled with Divya De Leon PA-C for 1/31/2023 at 12:00 p.m., and will be reminded on 1/30.    Ayleen Soriano  ED Navigator  [phone]

## 2024-01-30 ENCOUNTER — PATIENT OUTREACH (OUTPATIENT)
Dept: EMERGENCY MEDICINE | Facility: HOSPITAL | Age: 69
End: 2024-01-30
Payer: MEDICARE

## 2024-01-30 ENCOUNTER — TELEPHONE (OUTPATIENT)
Dept: PSYCHIATRY | Facility: CLINIC | Age: 69
End: 2024-01-30
Payer: MEDICARE

## 2024-01-30 NOTE — PROGRESS NOTES
Did not realize until leaving a VM that pt's appointment changed from 1/31 to 2/1. Pt was reminded through  about her appointment with Sadaf Vargas MD for 2/1/2024 at 9:00 a.m. Pt was asked to return to the call.    Ayleen Soriano  ED Navigator  [phone]

## 2024-02-02 DIAGNOSIS — E78.5 HYPERLIPIDEMIA, UNSPECIFIED HYPERLIPIDEMIA TYPE: ICD-10-CM

## 2024-02-02 RX ORDER — ATORVASTATIN CALCIUM 80 MG/1
80 TABLET, FILM COATED ORAL DAILY
Qty: 90 TABLET | Refills: 0 | Status: SHIPPED | OUTPATIENT
Start: 2024-02-02 | End: 2024-04-08

## 2024-02-02 NOTE — TELEPHONE ENCOUNTER
----- Message from Debbi Calderon MA sent at 2/2/2024 11:39 AM CST -----  Contact: [phone]  Pt called                  In regards to needing to speak with provider or staff to confirm if medication was refilled (atorvastatin (LIPITOR) 80 MG tablet. Pt stated that pharmacy was waiting on a  approval for provider.

## 2024-02-02 NOTE — TELEPHONE ENCOUNTER
No care due was identified.  [facility] Embedded Care Due Messages. Reference number: [id number].   2/02/2024 11:43:53 AM CST

## 2024-02-02 NOTE — TELEPHONE ENCOUNTER
Provider Staff:  Action required. This patient has received emergency care.     Please schedule patient for a follow up appointment.     Thanks!  [facility]     Appointments      Date Provider   Last Visit   6/6/2023 Sadaf Vargas MD   Next Visit   Visit date not found Sadaf Vargas MD          Refill Decision Note   Bhavna Landry  is requesting a refill authorization.  Brief Assessment and Rationale for Refill:  Approve     Medication Therapy Plan:  ED noted reviewed. Therapy unchanged. Will approve 90 days and alert PCP of acute care.      Extended chart review required: Yes   Comments:     Note composed:11:52 AM 02/02/2024

## 2024-02-07 ENCOUNTER — LAB VISIT (OUTPATIENT)
Dept: LAB | Facility: HOSPITAL | Age: 69
End: 2024-02-07
Attending: INTERNAL MEDICINE
Payer: MEDICARE

## 2024-02-07 DIAGNOSIS — C50.411 CARCINOMA OF UPPER-OUTER QUADRANT OF RIGHT BREAST IN FEMALE, ESTROGEN RECEPTOR POSITIVE: ICD-10-CM

## 2024-02-07 DIAGNOSIS — Z17.0 CARCINOMA OF UPPER-OUTER QUADRANT OF RIGHT BREAST IN FEMALE, ESTROGEN RECEPTOR POSITIVE: ICD-10-CM

## 2024-02-07 DIAGNOSIS — L40.50 PSA (PSORIATIC ARTHRITIS): ICD-10-CM

## 2024-02-07 LAB
ALBUMIN SERPL BCP-MCNC: 3.5 G/DL (ref 3.5–5.2)
ALP SERPL-CCNC: 69 U/L (ref 55–135)
ALT SERPL W/O P-5'-P-CCNC: 16 U/L (ref 10–44)
ANION GAP SERPL CALC-SCNC: 7 MMOL/L (ref 8–16)
AST SERPL-CCNC: 22 U/L (ref 10–40)
BASOPHILS # BLD AUTO: 0.04 K/UL (ref 0–0.2)
BASOPHILS NFR BLD: 0.5 % (ref 0–1.9)
BILIRUB SERPL-MCNC: 0.4 MG/DL (ref 0.1–1)
BUN SERPL-MCNC: 6 MG/DL (ref 8–23)
CALCIUM SERPL-MCNC: 9.2 MG/DL (ref 8.7–10.5)
CHLORIDE SERPL-SCNC: 103 MMOL/L (ref 95–110)
CO2 SERPL-SCNC: 27 MMOL/L (ref 23–29)
CREAT SERPL-MCNC: 0.8 MG/DL (ref 0.5–1.4)
CRP SERPL-MCNC: 0.8 MG/L (ref 0–8.2)
DIFFERENTIAL METHOD BLD: ABNORMAL
EOSINOPHIL # BLD AUTO: 0.2 K/UL (ref 0–0.5)
EOSINOPHIL NFR BLD: 2.5 % (ref 0–8)
ERYTHROCYTE [DISTWIDTH] IN BLOOD BY AUTOMATED COUNT: 18.8 % (ref 11.5–14.5)
ERYTHROCYTE [SEDIMENTATION RATE] IN BLOOD BY PHOTOMETRIC METHOD: 4 MM/HR (ref 0–36)
EST. GFR  (NO RACE VARIABLE): >60 ML/MIN/1.73 M^2
FERRITIN SERPL-MCNC: 132 NG/ML (ref 20–300)
GLUCOSE SERPL-MCNC: 87 MG/DL (ref 70–110)
HCT VFR BLD AUTO: 42.3 % (ref 37–48.5)
HGB BLD-MCNC: 13.1 G/DL (ref 12–16)
IMM GRANULOCYTES # BLD AUTO: 0.01 K/UL (ref 0–0.04)
IMM GRANULOCYTES NFR BLD AUTO: 0.1 % (ref 0–0.5)
IRON SERPL-MCNC: 93 UG/DL (ref 30–160)
LYMPHOCYTES # BLD AUTO: 1.6 K/UL (ref 1–4.8)
LYMPHOCYTES NFR BLD: 20.9 % (ref 18–48)
MCH RBC QN AUTO: 30.7 PG (ref 27–31)
MCHC RBC AUTO-ENTMCNC: 31 G/DL (ref 32–36)
MCV RBC AUTO: 99 FL (ref 82–98)
MONOCYTES # BLD AUTO: 0.6 K/UL (ref 0.3–1)
MONOCYTES NFR BLD: 7.8 % (ref 4–15)
NEUTROPHILS # BLD AUTO: 5.2 K/UL (ref 1.8–7.7)
NEUTROPHILS NFR BLD: 68.2 % (ref 38–73)
NRBC BLD-RTO: 0 /100 WBC
PLATELET # BLD AUTO: 346 K/UL (ref 150–450)
PMV BLD AUTO: 8.7 FL (ref 9.2–12.9)
POTASSIUM SERPL-SCNC: 4.3 MMOL/L (ref 3.5–5.1)
PROT SERPL-MCNC: 5.9 G/DL (ref 6–8.4)
RBC # BLD AUTO: 4.27 M/UL (ref 4–5.4)
SATURATED IRON: 28 % (ref 20–50)
SODIUM SERPL-SCNC: 137 MMOL/L (ref 136–145)
TOTAL IRON BINDING CAPACITY: 336 UG/DL (ref 250–450)
TRANSFERRIN SERPL-MCNC: 227 MG/DL (ref 200–375)
WBC # BLD AUTO: 7.6 K/UL (ref 3.9–12.7)

## 2024-02-07 PROCEDURE — 83540 ASSAY OF IRON: CPT | Performed by: INTERNAL MEDICINE

## 2024-02-07 PROCEDURE — 36415 COLL VENOUS BLD VENIPUNCTURE: CPT | Mod: PO | Performed by: INTERNAL MEDICINE

## 2024-02-07 PROCEDURE — 85652 RBC SED RATE AUTOMATED: CPT | Performed by: INTERNAL MEDICINE

## 2024-02-07 PROCEDURE — 85025 COMPLETE CBC W/AUTO DIFF WBC: CPT | Performed by: INTERNAL MEDICINE

## 2024-02-07 PROCEDURE — 80053 COMPREHEN METABOLIC PANEL: CPT | Performed by: INTERNAL MEDICINE

## 2024-02-07 PROCEDURE — 82728 ASSAY OF FERRITIN: CPT | Performed by: INTERNAL MEDICINE

## 2024-02-07 PROCEDURE — 86140 C-REACTIVE PROTEIN: CPT | Performed by: INTERNAL MEDICINE

## 2024-02-08 ENCOUNTER — TELEPHONE (OUTPATIENT)
Dept: RHEUMATOLOGY | Facility: CLINIC | Age: 69
End: 2024-02-08
Payer: MEDICARE

## 2024-02-08 DIAGNOSIS — R79.89 LOW SERUM TOTAL PROTEIN LEVEL: Primary | ICD-10-CM

## 2024-02-13 ENCOUNTER — OFFICE VISIT (OUTPATIENT)
Dept: URGENT CARE | Facility: CLINIC | Age: 69
End: 2024-02-13
Payer: MEDICARE

## 2024-02-13 VITALS
SYSTOLIC BLOOD PRESSURE: 127 MMHG | WEIGHT: 102.94 LBS | BODY MASS INDEX: 18.94 KG/M2 | RESPIRATION RATE: 18 BRPM | TEMPERATURE: 98 F | OXYGEN SATURATION: 95 % | HEART RATE: 78 BPM | DIASTOLIC BLOOD PRESSURE: 74 MMHG | HEIGHT: 62 IN

## 2024-02-13 DIAGNOSIS — R59.0 SUBMANDIBULAR LYMPHADENOPATHY: ICD-10-CM

## 2024-02-13 DIAGNOSIS — J01.90 ACUTE BACTERIAL SINUSITIS: Primary | ICD-10-CM

## 2024-02-13 DIAGNOSIS — B96.89 ACUTE BACTERIAL SINUSITIS: Primary | ICD-10-CM

## 2024-02-13 LAB
CTP QC/QA: YES
SARS-COV-2 AG RESP QL IA.RAPID: NEGATIVE

## 2024-02-13 PROCEDURE — 87811 SARS-COV-2 COVID19 W/OPTIC: CPT | Mod: QW,S$GLB,, | Performed by: NURSE PRACTITIONER

## 2024-02-13 PROCEDURE — 99213 OFFICE O/P EST LOW 20 MIN: CPT | Mod: S$GLB,,, | Performed by: NURSE PRACTITIONER

## 2024-02-13 RX ORDER — AMOXICILLIN AND CLAVULANATE POTASSIUM 875; 125 MG/1; MG/1
1 TABLET, FILM COATED ORAL EVERY 12 HOURS
Qty: 20 TABLET | Refills: 0 | Status: SHIPPED | OUTPATIENT
Start: 2024-02-13 | End: 2024-02-23

## 2024-02-13 RX ORDER — AMOXICILLIN AND CLAVULANATE POTASSIUM 875; 125 MG/1; MG/1
1 TABLET, FILM COATED ORAL EVERY 12 HOURS
Qty: 14 TABLET | Refills: 0 | Status: SHIPPED | OUTPATIENT
Start: 2024-02-13 | End: 2024-02-13

## 2024-02-13 RX ORDER — BENZONATATE 100 MG/1
100 CAPSULE ORAL 3 TIMES DAILY PRN
Qty: 30 CAPSULE | Refills: 0 | Status: SHIPPED | OUTPATIENT
Start: 2024-02-13 | End: 2024-02-23

## 2024-02-13 NOTE — PROGRESS NOTES
"Subjective:      Patient ID: Bhavna Landry is a 68 y.o. female.    Vitals:  height is 5' 2" (1.575 m) and weight is 46.7 kg (102 lb 15.3 oz). Her oral temperature is 97.7 °F (36.5 °C). Her blood pressure is 127/74 and her pulse is 78. Her respiration is 18 and oxygen saturation is 95%.     Chief Complaint: Sore Throat    Pt coming into clinic with cough, sore throat, congestion, lump under jaw bone, sx started 3 weeks ago, treatment includes nothing.  Provider note below:  This is a 68 y.o. female who presents today with a chief complaint of cough, sore throat, "lump under left jaw bone bone" , symptoms started 3 weeks ago,  denies fever, body aches or chills, denies wheezing or shortness of breath, denies nausea, vomiting, diarrhea or abdominal pain, denies chest pain or dizziness positional lightheadedness, denies sore throat or trouble swallowing, denies loss of taste or smell, or any other symptoms       Sore Throat   This is a new problem. The current episode started 1 to 4 weeks ago. The problem has been unchanged. The pain is worse on the left side. There has been no fever. The pain is at a severity of 4/10. The pain is mild. Associated symptoms include congestion, coughing, headaches and swollen glands. Pertinent negatives include no abdominal pain, diarrhea, drooling, ear discharge, ear pain, hoarse voice, plugged ear sensation, neck pain, shortness of breath, stridor, trouble swallowing or vomiting. She has had no exposure to strep or mono. She has tried nothing for the symptoms. The treatment provided no relief.     HENT:  Positive for congestion, postnasal drip, sinus pain and sore throat. Negative for ear pain, ear discharge, drooling and trouble swallowing.    Neck: Negative for neck pain.   Respiratory:  Positive for cough. Negative for shortness of breath and stridor.    Gastrointestinal:  Negative for abdominal pain, vomiting and diarrhea.   Neurological:  Positive for headaches.      Past Medical " History:   Diagnosis Date    Allergy     Amblyopia     Anemia     Anticoagulant long-term use     Arthritis 02/02/1992    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, estrogen receptor positive 04/13/2022    Cataract     Depression     Dry eyes     Dry mouth     Duane's syndrome of right eye     Early dry stage nonexudative age-related macular degeneration of both eyes 09/20/2022    Fibromyalgia 04/17/2014    Fibromyalgia     Fractured hip     RIGHT HIP    GERD (gastroesophageal reflux disease)     History of psychiatric hospitalization     Hyperlipidemia 02/02/1992    Hypertension     Hypocalcemia     Hyponatremia     Kidney stone     Migraine headache     Osteoporosis     Pressure ulcer of unspecified site, unspecified stage     Psoriatic arthritis 02/02/1992    Right knee pain     post knee replacement surgery (possible rejectiion of metal)    RLS (restless legs syndrome)     Schizophrenia 02/02/1992    stable on meds    Sciatica     Squamous cell carcinoma of skin     Urinary tract infection        Objective:     Physical Exam   Constitutional: She is oriented to person, place, and time. She appears well-developed. She is cooperative.  Non-toxic appearance. She does not appear ill. No distress.   HENT:   Head: Normocephalic and atraumatic.   Ears:   Right Ear: Hearing, tympanic membrane, external ear and ear canal normal.   Left Ear: Hearing, tympanic membrane, external ear and ear canal normal.   Nose: Nose normal. No mucosal edema, rhinorrhea or nasal deformity. No epistaxis. Right sinus exhibits no maxillary sinus tenderness and no frontal sinus tenderness. Left sinus exhibits no maxillary sinus tenderness and no frontal sinus tenderness.   Mouth/Throat: Uvula is midline, oropharynx is clear and moist and mucous membranes are normal. No trismus in the jaw. Normal dentition. No uvula swelling. No oropharyngeal exudate, posterior oropharyngeal edema, posterior oropharyngeal erythema, tonsillar  abscesses or cobblestoning.   No jake's or trismus, able to tolerate secretions, uvula midline      Comments: No jake's or trismus, able to tolerate secretions, uvula midline  Eyes: Conjunctivae and lids are normal. No scleral icterus.   Neck: Trachea normal and phonation normal. Neck supple. No edema present. No erythema present. No neck rigidity present.   Cardiovascular: Normal rate, regular rhythm, normal heart sounds and normal pulses.   Pulmonary/Chest: Effort normal and breath sounds normal. No stridor. No respiratory distress. She has no decreased breath sounds. She has no wheezes. She has no rhonchi. She has no rales.   Abdominal: Normal appearance.   Musculoskeletal: Normal range of motion.         General: No deformity. Normal range of motion.   Lymphadenopathy:        Head (right side): No submandibular adenopathy present.        Head (left side): Submandibular adenopathy present.     She has no cervical adenopathy.   Neurological: She is alert and oriented to person, place, and time. She exhibits normal muscle tone. Coordination normal.   Skin: Skin is warm, dry, intact, not diaphoretic and not pale.   Psychiatric: Her speech is normal and behavior is normal. Judgment and thought content normal.   Nursing note and vitals reviewed.    Results for orders placed or performed in visit on 02/13/24   SARS Coronavirus 2 Antigen, POCT Manual Read   Result Value Ref Range    SARS Coronavirus 2 Antigen Negative Negative     Acceptable Yes      *Note: Due to a large number of results and/or encounters for the requested time period, some results have not been displayed. A complete set of results can be found in Results Review.         Patient in no acute distress.  Vitals reassuring.  Discussed results/diagnosis/plan in depth with patient in clinic. Strict precautions given to patient to monitor for worsening signs and symptoms. Advised to follow up with primary.All questions answered. Strict ER  precautions given. If your symptoms worsens or fail to improve you should go to the Emergency Room. Discharge and follow-up instructions given verbally/printed. Discharge and follow-up instructions discussed with the patient who expressed understanding and willingness to comply with my recommendations.Patient voiced understanding and in agreement with current treatment plan.     Please be advised this text was dictated with [employer] software and may contain errors due to translation.   Assessment:     1. Acute bacterial sinusitis    2. Submandibular lymphadenopathy        Plan:       Acute bacterial sinusitis  -     SARS Coronavirus 2 Antigen, POCT Manual Read  -     Discontinue: amoxicillin-clavulanate 875-125mg (AUGMENTIN) 875-125 mg per tablet; Take 1 tablet by mouth every 12 (twelve) hours. for 7 days  Dispense: 14 tablet; Refill: 0  -     amoxicillin-clavulanate 875-125mg (AUGMENTIN) 875-125 mg per tablet; Take 1 tablet by mouth every 12 (twelve) hours. for 10 days  Dispense: 20 tablet; Refill: 0    Submandibular lymphadenopathy    Other orders  -     benzonatate (TESSALON) 100 MG capsule; Take 1 capsule (100 mg total) by mouth 3 (three) times daily as needed for Cough.  Dispense: 30 capsule; Refill: 0                  Patient Instructions   PLEASE READ YOUR DISCHARGE INSTRUCTIONS ENTIRELY AS IT CONTAINS IMPORTANT INFORMATION.    Try over the counter afrin, but for NO LONGER than 3 days as it can cause rebound congestion. Then you can switch to flonase if you find the nasal sprays are working well.     If you find this dries your nose out or your nose bleeds, try using over the counter nasal saline a few minutes prior to using the flonase to moisten the lining of your nose and throughout the day as needed.     Please take an over the counter antihistamine medication (allegra/Claritin/Zyrtec) of your choice as directed and mucinex-D (if it gives you funny heartbeats you can switch to regular mucinex).     You can  try breathe right strips at night to help you breathe.  A cool mist humidifier in bedroom may help with cough and relieve stuffy nose.     Sore throat recommendations: Warm fluids, warm salt water gargles, throat lozenges, tea, honey, soup, rest, hydration.     Sinus rinses DO NOT USE TAP WATER, if you must, water must be a rolling boil for 1 minute, let it cool, then use.  May use distilled water, or over the counter nasal saline rinses.  Vics vapor rub in shower to help open nasal passages.  May use nasal gel to keep passages moisturized.  May use Nasal saline sprays during the day for added relief of congestion.   For those who go to the gym, please do not use the sauna or steam room now to clear sinuses.    During pollen season, change shirt if you are outside for a while when you go in.  Also wash your face.  Do not touch your face with your hands.  Wash your hands often in general while ill, avoid face contact with hands. Good nutrition. Lots of rest. Plenty of fluids    Over the counter you can use Tylenol (acetominophen) or Ibuprofen for your minor aches and pains as long as you have no contraindications.        Please return or see your primary care doctor if you develop new or worsening symptoms.     Please arrange follow up with your primary medical clinic as soon as possible. You must understand that you've received an Urgent Care treatment only and that you may be released before all of your medical problems are known or treated. You, the patient, will arrange for follow up as instructed. If your symptoms worsen or fail to improve you should go to the Emergency Room.

## 2024-02-15 ENCOUNTER — TELEPHONE (OUTPATIENT)
Dept: PSYCHIATRY | Facility: CLINIC | Age: 69
End: 2024-02-15
Payer: MEDICARE

## 2024-02-19 DIAGNOSIS — F20.0 PARANOID SCHIZOPHRENIA: ICD-10-CM

## 2024-02-19 RX ORDER — RISPERIDONE 2 MG/1
TABLET ORAL
Qty: 90 TABLET | Refills: 3 | Status: SHIPPED | OUTPATIENT
Start: 2024-02-19

## 2024-02-21 ENCOUNTER — TELEPHONE (OUTPATIENT)
Dept: RHEUMATOLOGY | Facility: CLINIC | Age: 69
End: 2024-02-21
Payer: MEDICARE

## 2024-02-21 ENCOUNTER — LAB VISIT (OUTPATIENT)
Dept: LAB | Facility: HOSPITAL | Age: 69
End: 2024-02-21
Attending: INTERNAL MEDICINE
Payer: MEDICARE

## 2024-02-21 DIAGNOSIS — D80.1 HYPOGAMMAGLOBULINEMIA: Primary | ICD-10-CM

## 2024-02-21 DIAGNOSIS — R79.89 LOW SERUM TOTAL PROTEIN LEVEL: ICD-10-CM

## 2024-02-21 LAB
IGA SERPL-MCNC: 139 MG/DL (ref 40–350)
IGG SERPL-MCNC: 409 MG/DL (ref 650–1600)
IGM SERPL-MCNC: 21 MG/DL (ref 50–300)

## 2024-02-21 PROCEDURE — 84165 PROTEIN E-PHORESIS SERUM: CPT | Performed by: INTERNAL MEDICINE

## 2024-02-21 PROCEDURE — 86334 IMMUNOFIX E-PHORESIS SERUM: CPT | Performed by: INTERNAL MEDICINE

## 2024-02-21 PROCEDURE — 84165 PROTEIN E-PHORESIS SERUM: CPT | Mod: 26,,, | Performed by: PATHOLOGY

## 2024-02-21 PROCEDURE — 82784 ASSAY IGA/IGD/IGG/IGM EACH: CPT | Mod: 59 | Performed by: INTERNAL MEDICINE

## 2024-02-21 PROCEDURE — 83521 IG LIGHT CHAINS FREE EACH: CPT | Performed by: INTERNAL MEDICINE

## 2024-02-21 PROCEDURE — 86334 IMMUNOFIX E-PHORESIS SERUM: CPT | Mod: 26,,, | Performed by: PATHOLOGY

## 2024-02-21 PROCEDURE — 36415 COLL VENOUS BLD VENIPUNCTURE: CPT | Mod: PO | Performed by: INTERNAL MEDICINE

## 2024-02-22 LAB
ALBUMIN SERPL ELPH-MCNC: 3.86 G/DL (ref 3.35–5.55)
ALPHA1 GLOB SERPL ELPH-MCNC: 0.31 G/DL (ref 0.17–0.41)
ALPHA2 GLOB SERPL ELPH-MCNC: 0.79 G/DL (ref 0.43–0.99)
B-GLOBULIN SERPL ELPH-MCNC: 0.65 G/DL (ref 0.5–1.1)
GAMMA GLOB SERPL ELPH-MCNC: 0.39 G/DL (ref 0.67–1.58)
INTERPRETATION SERPL IFE-IMP: NORMAL
KAPPA LC SER QL IA: 1.72 MG/DL (ref 0.33–1.94)
KAPPA LC/LAMBDA SER IA: 1.04 (ref 0.26–1.65)
LAMBDA LC SER QL IA: 1.66 MG/DL (ref 0.57–2.63)
PROT SERPL-MCNC: 6 G/DL (ref 6–8.4)

## 2024-02-23 ENCOUNTER — OFFICE VISIT (OUTPATIENT)
Dept: RHEUMATOLOGY | Facility: CLINIC | Age: 69
End: 2024-02-23
Payer: MEDICARE

## 2024-02-23 ENCOUNTER — OFFICE VISIT (OUTPATIENT)
Dept: ALLERGY | Facility: CLINIC | Age: 69
End: 2024-02-23
Payer: MEDICARE

## 2024-02-23 VITALS
HEART RATE: 71 BPM | DIASTOLIC BLOOD PRESSURE: 70 MMHG | SYSTOLIC BLOOD PRESSURE: 138 MMHG | BODY MASS INDEX: 18.31 KG/M2 | OXYGEN SATURATION: 97 % | WEIGHT: 100.06 LBS

## 2024-02-23 VITALS
DIASTOLIC BLOOD PRESSURE: 71 MMHG | BODY MASS INDEX: 18.33 KG/M2 | WEIGHT: 99.63 LBS | SYSTOLIC BLOOD PRESSURE: 126 MMHG | HEIGHT: 62 IN | HEART RATE: 67 BPM

## 2024-02-23 DIAGNOSIS — L40.50 PSORIATIC ARTHRITIS: ICD-10-CM

## 2024-02-23 DIAGNOSIS — D84.821 DRUG-INDUCED IMMUNODEFICIENCY: ICD-10-CM

## 2024-02-23 DIAGNOSIS — R06.02 SHORTNESS OF BREATH: ICD-10-CM

## 2024-02-23 DIAGNOSIS — D80.1 HYPOGAMMAGLOBULINEMIA: Primary | ICD-10-CM

## 2024-02-23 DIAGNOSIS — Z78.0 MENOPAUSE: ICD-10-CM

## 2024-02-23 DIAGNOSIS — J32.9 RECURRENT SINUSITIS: ICD-10-CM

## 2024-02-23 DIAGNOSIS — Z79.899 DRUG-INDUCED IMMUNODEFICIENCY: ICD-10-CM

## 2024-02-23 DIAGNOSIS — J31.0 RHINITIS, UNSPECIFIED TYPE: ICD-10-CM

## 2024-02-23 DIAGNOSIS — Z86.19 FREQUENT INFECTIONS: ICD-10-CM

## 2024-02-23 DIAGNOSIS — R06.2 WHEEZING: ICD-10-CM

## 2024-02-23 DIAGNOSIS — M81.0 OSTEOPOROSIS, UNSPECIFIED OSTEOPOROSIS TYPE, UNSPECIFIED PATHOLOGICAL FRACTURE PRESENCE: Primary | ICD-10-CM

## 2024-02-23 PROCEDURE — 99999 PR PBB SHADOW E&M-EST. PATIENT-LVL V: CPT | Mod: PBBFAC,,, | Performed by: STUDENT IN AN ORGANIZED HEALTH CARE EDUCATION/TRAINING PROGRAM

## 2024-02-23 PROCEDURE — 99213 OFFICE O/P EST LOW 20 MIN: CPT | Mod: S$GLB,,, | Performed by: INTERNAL MEDICINE

## 2024-02-23 PROCEDURE — 99205 OFFICE O/P NEW HI 60 MIN: CPT | Mod: S$GLB,,, | Performed by: STUDENT IN AN ORGANIZED HEALTH CARE EDUCATION/TRAINING PROGRAM

## 2024-02-23 PROCEDURE — 99999 PR PBB SHADOW E&M-EST. PATIENT-LVL IV: CPT | Mod: PBBFAC,,, | Performed by: INTERNAL MEDICINE

## 2024-02-23 RX ORDER — METHOTREXATE 2.5 MG/1
20 TABLET ORAL
Qty: 96 TABLET | Refills: 0 | Status: SHIPPED | OUTPATIENT
Start: 2024-02-23 | End: 2024-05-09

## 2024-02-23 RX ORDER — FOLIC ACID 1 MG/1
1000 TABLET ORAL DAILY
Qty: 90 TABLET | Refills: 3 | Status: SHIPPED | OUTPATIENT
Start: 2024-02-23

## 2024-02-23 ASSESSMENT — ROUTINE ASSESSMENT OF PATIENT INDEX DATA (RAPID3)
FATIGUE SCORE: 7
TOTAL RAPID3 SCORE: 2.5
MDHAQ FUNCTION SCORE: 0.6
AM STIFFNESS SCORE: 1, YES
PAIN SCORE: 4
PSYCHOLOGICAL DISTRESS SCORE: 3.3
PATIENT GLOBAL ASSESSMENT SCORE: 1.5
WHEN YOU AWAKENED IN THE MORNING OVER THE LAST WEEK, PLEASE INDICATE THE AMOUNT OF TIME IT TAKES UNTIL YOU ARE AS LIMBER AS YOU WILL BE FOR THE DAY: 1HOUR

## 2024-02-23 NOTE — PROGRESS NOTES
ALLERGY & IMMUNOLOGY CLINIC - INITIAL CONSULTATION      HISTORY OF PRESENT ILLNESS     Patient ID: Bhavna Landry is a 68 y.o. female    CC: recurrent sinusitis, hypogammaglobulinemia     HPI: Bhavna Landry is a 68 y.o. female with a history of breast cancer (diagnosed 2022), HTN, psoriatic arthritis, OA, and osteoporosis, presenting for recurrent sinusitis and hypogammaglobulinemia.   Patient was referred by Mode Cornelius MD (rheum).    She had sinus surgery in 2021.   She had pneumovax 3 times (last in 10/2020).  She had prevnar-13 in 8/2018.     She says she gets sinus infections that can drain into her ears or lungs.  She has a cough. When she drinks hot drinks, it loosens it up and she can cough mucus up. If she doesn't drink hot drinks, its a dry cough.  She reports her current symptoms started in Nov. She says she had sinus infections prior to that, but not as often.   She says she gets left sided LAD that had gone down when she was on her recent course of antibiotics.   She endorses post-nasal drip. She says sometimes she feels like she has fluid in her ear. She says she always has sinus pressure. She can get thick mucus.     In the [facility] system, it looks like she has required antibiotics 4 times in the past year. She thinks it was more than that but says she gets all her care at [facility].   Usually when she gets antibiotics, its for a sinus infection or upper respiratory infection.  In 11/2022, she had sinus culture positive to curvularia. She was treated with oral fluconazole and voriconazole sinus rinse. Subsequent sinus cultures were negative.   She reports she had pneumonia decades ago.   She says the sinus infections can spread to the ears.  She denies other types of infections such as GI infections or skin infections.     She says she has never been tested for allergy. She says she didn't think she had allergies, she just thought she was getting sick. She denies ocular pruritus or nasal  pruritus. She endorses congestion. She says she gets rhinorrhea sometimes. She does get post-nasal drip. She doesn't notice any triggers (except she says her cat might be triggering respiratory symptoms at night). Her rhinosinusitis occurs any time of year. In between sinus infections, she usually is without rhinitis, but symptoms more persistent since 11/2023. She says as soon as she gets off antibiotics, she feels she is right back with an infection. She also complains of sinus headaches.   She uses flonase 2 SEN prn. She uses it maybe 2-3 times per week.   She uses a nasal saline spray maybe twice per week.     She was recently represcribed nebs (she says she hadn't needed it for a long time). She has an inhaler as well. She estimates she uses the albuterol inhaler about 3-4 times per week, which she feels she needs when she lays down at night. She can get wheezing at night, sometimes shortness of breath. She has never seen a pulmonologist. She hasn't had lung function testing. She thinks she had asthma as a child, but she outgrew it. She started getting the respiratory symptoms again 10-15 years ago. She started smoking in her 30's. She smokes about 5 cigarettes per day. She has symbicort, but only uses it as needed. She says it is for bronchitis. She hasn't been using it much recently. She doesn't think she has been diagnosed with COPD, but it looks like it has been suspected if not diagnosed, and she had ED visit 1/25/24 diagnosed as COPD exacerbation.      MEDICAL HISTORY     Vaccines:    Immunization History   Administered Date(s) Administered    COVID-19 MRNA, LN-S PF (MODERNA HALF 0.25 ML DOSE) 04/01/2022    COVID-19, MRNA, LN-S, PF (MODERNA FULL 0.5 ML DOSE) 02/19/2021, 03/19/2021, 09/24/2021    COVID-19, mRNA, LNP-S, PF (Moderna 2023)Ages 12+ 10/17/2023    Influenza 01/14/2014, 09/16/2016    Influenza (FLUAD) - Quadrivalent - Adjuvanted - PF *Preferred* (65+) 11/17/2021    Influenza - High Dose - PF (65  years and older) 10/06/2018, 10/24/2019    Influenza - Quadrivalent 10/20/2015    Influenza - Quadrivalent - High Dose - PF (65 years and older) 10/03/2020, 09/23/2022, 09/14/2023    Influenza - Quadrivalent - PF *Preferred* (6 months and older) 12/11/2014, 10/30/2017    Influenza - Trivalent (ADULT) 01/14/2014    Influenza - Trivalent - PF (ADULT) 09/16/2016    Influenza Split 01/14/2014    Pneumococcal Conjugate - 13 Valent 08/15/2018    Pneumococcal Polysaccharide - 23 Valent 01/14/2014, 11/26/2019, 10/03/2020    RSVpreF (Arexvy) 09/14/2023    Tdap 04/18/2007, 07/17/2017    Zoster 12/28/2015, 07/25/2016    Zoster Recombinant 10/12/2020, 01/02/2021     Medical Hx:   Patient Active Problem List   Diagnosis    PSA (psoriatic arthritis)    Low back pain    Essential hypertension    Hyperlipidemia    Paranoid schizophrenia    Insomnia    Extrapyramidal syndrome    Migraine without aura and without status migrainosus, not intractable    Duane's syndrome of right eye    Posterior vitreous detachment, both eyes    Retinal hemorrhage, left    Retinal tear    Fibromyalgia    History of nickel allergy    Internal derangement of right knee    S/P R knee surgery, TKA revision    Pain of right tibia    Brow ptosis    Sacroiliac joint dysfunction    Chronic pain    Myofascial pain syndrome    Restless legs    Osteoporosis, post-menopausal    Senile nuclear sclerosis    Venous incompetence    Bilateral sacroiliitis    PAF (paroxysmal atrial fibrillation)    Bilateral carotid artery disease    Chronic anticoagulation    Left groin pain    Left hip pain    Mucopurulent chronic bronchitis    Vestibular disequilibrium, left    Decreased ROM of neck    Greater trochanteric bursitis of left hip    Hyponatremia    Depression    Rheumatoid arthritis    Mild cognitive impairment with memory loss    Atherosclerosis of aorta    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, estrogen receptor positive    Osteopenia of multiple  sites    Use of anastrozole    Early dry stage nonexudative age-related macular degeneration of both eyes    Aromatase inhibitor-associated arthralgia    Drug-induced immunodeficiency    Iron deficiency anemia due to chronic blood loss    Decreased functional mobility    Decreased range of motion of lumbar spine    Secondary and unspecified malignant neoplasm of axilla and upper limb lymph nodes    Leg edema, left    Gastroesophageal reflux disease    Injury of left foot    RASHIDA (generalized anxiety disorder)    Cervical spondylosis    Lumbar spondylosis    Decreased strength     Surgical Hx:   Past Surgical History:   Procedure Laterality Date    brow ptosis repair Right 2019    Surgeon: Dr. Karla Henson    CATARACT EXTRACTION       SECTION      COLONOSCOPY N/A 2016    Procedure: COLONOSCOPY;  Surgeon: ANDRIA Connelly MD;  Location: [address]);  Service: Endoscopy;  Laterality: N/A;    COLONOSCOPY N/A 2022    Procedure: COLONOSCOPY;  Surgeon: Mikey Walters MD;  Location: [facility];  Service: Endoscopy;  Laterality: N/A;    cyst removed from right sinus  1982    EPIDURAL STEROID INJECTION INTO CERVICAL SPINE N/A 2023    Procedure: C6-7 SOPHY;  Surgeon: Spring Jensen MD;  Location: [facility] PAIN MANAGEMENT;  Service: Pain Management;  Laterality: N/A;  20 mins    EPIDURAL STEROID INJECTION INTO LUMBAR SPINE N/A 2023    Procedure: Injection-steroid-epidural-lumbar L5-S1;  Surgeon: Chirag Kim MD;  Location: [facility] PAIN MANAGEMENT;  Service: Pain Management;  Laterality: N/A;  asa    ESOPHAGOGASTRODUODENOSCOPY N/A 2023    Procedure: EGD (ESOPHAGOGASTRODUODENOSCOPY);  Surgeon: Dougie Shea MD;  Location: [facility];  Service: Endoscopy;  Laterality: N/A;    HYSTERECTOMY      VAGINAL HYSTERECTOMY WITHOUT BSO - ENDOMETRIOSIS    INJECTION FOR SENTINEL NODE IDENTIFICATION Right 2022    Procedure: INJECTION, FOR SENTINEL NODE IDENTIFICATION-Right;  Surgeon: NEAL  Kasey Dhillon MD;  Location: [facility] OR;  Service: General;  Laterality: Right;    INJECTION OF ANESTHETIC AGENT AROUND MEDIAL BRANCH NERVES INNERVATING LUMBAR FACET JOINT N/A 04/11/2019    Procedure: Lumbo-sacral Block, DR5 and Lateral Branches of S1,S2, S3;  Surgeon: Michelle Pineda Jr., MD;  Location: [facility] PAIN MGT;  Service: Pain Management;  Laterality: N/A;  Pt takes  and states she holds ASA on her own whenever she has procedures.  Instructed to hold x 3 days prior to procedure.      INJECTION OF ANESTHETIC AGENT AROUND MEDIAL BRANCH NERVES INNERVATING LUMBAR FACET JOINT Bilateral 05/03/2023    Procedure: Block-nerve-medial branch-lumbar bilateral L3, L4, L5;  Surgeon: Maile Storm DO;  Location: [facility] PAIN MGT;  Service: Pain Management;  Laterality: Bilateral;  asa    INJECTION OF ANESTHETIC AGENT INTO SACROILIAC JOINT Right 08/30/2018    Procedure: BLOCK, SACROILIAC JOINT-Right- ORAL SEDATION;  Surgeon: Michelle Pineda Jr., MD;  Location: [facility] PAIN [facility];  Service: Pain Management;  Laterality: Right;    INJECTION OF ANESTHETIC AGENT INTO SACROILIAC JOINT Bilateral 09/27/2018    Procedure: BLOCK, SACROILIAC JOINT-BILATERAL;  Surgeon: Michelle Pineda Jr., MD;  Location: [facility] PAIN [facility];  Service: Pain Management;  Laterality: Bilateral;  Please keep at 10:00 due to trasnsportation    INJECTION OF ANESTHETIC AGENT INTO SACROILIAC JOINT Bilateral 02/07/2019    Procedure: Bilateral Sacroiliac Joint Injection - Per Dr Pineda, not necessary to hold ASA.;  Surgeon: Michelle Pineda Jr., MD;  Location: [facility] PAIN MGT;  Service: Pain Management;  Laterality: Bilateral;    INTRAOCULAR PROSTHESES INSERTION Right 08/01/2019    Procedure: INSERTION, IOL PROSTHESIS;  Surgeon: Karen Song MD;  Location: [address];  Service: Ophthalmology;  Laterality: Right;    INTRAOCULAR PROSTHESES INSERTION Left 09/26/2019    Procedure: INSERTION, IOL PROSTHESIS;  Surgeon: Karen Song MD;  Location: [facility]  OR 1ST FLR;  Service: Ophthalmology;  Laterality: Left;    JOINT REPLACEMENT Right     knee    KNEE SURGERY      MASTECTOMY Right 05/09/2022    Procedure: MASTECTOMY-Right;  Surgeon: NEAL Dhillon MD;  Location: [facility] OR;  Service: General;  Laterality: Right;  2.5 HOURS    PHACOEMULSIFICATION OF CATARACT Right 08/01/2019    Procedure: PHACOEMULSIFICATION, CATARACT;  Surgeon: Karen Song MD;  [facility];  Service: Ophthalmology;  Laterality: Right;    PHACOEMULSIFICATION OF CATARACT Left 09/26/2019    Procedure: PHACOEMULSIFICATION, CATARACT;  Surgeon: Karen Song MD;  Location: [facility] OR 1ST FLR;  Service: Ophthalmology;  Laterality: Left;    RADIOFREQUENCY THERMOCOAGULATION Right 05/07/2019    Procedure: RADIOFREQUENCY THERMAL COAGULATION RIGHT DORSAL RAMUS 5 AND LATERAL BRANCH OF S1, S2 AND S3;  Surgeon: Michelle Pineda Jr., MD;  Location: [facility];  Service: Pain Management;  Laterality: Right;    RADIOFREQUENCY THERMOCOAGULATION Left 05/14/2019    Procedure: RADIOFREQUENCY THERMAL COAGULATION LEFT DORSAL RAMUS 5 AND LATERAL BRANCH OF S1,S2 AND S3;  Surgeon: Michelle Pineda Jr., MD;  [facility];  Service: Pain Management;  Laterality: Left;    RADIOFREQUENCY THERMOCOAGULATION Right 10/22/2019    Procedure: RADIOFREQUENCY THERMAL COAGULATION---DARSAL RAMUS 5 and LATERAL S1,S2,and S3 Right;  Surgeon: Michelle Pineda Jr., MD;  [facility];  Service: Pain Management;  Laterality: Right;  patient to sign consent DOS    RADIOFREQUENCY THERMOCOAGULATION Left 10/29/2019    Procedure: RADIOFREQUENCY THERMAL COAGULATION - LEFT - DR5, S1,S2, AND S3;  Surgeon: Michelle Pineda Jr., MD;  Location: [facility] PAIN MGT;  Service: Pain Management;  Laterality: Left;    RADIOFREQUENCY THERMOCOAGULATION Left 05/26/2020    Procedure: RADIOFREQUENCY THERMAL COAGULATION--Left DR5+ lateral branches of S1, S2, S3;  Surgeon: Michelle Pineda Jr., MD;  Location: [facility] PAIN T;   Service: Pain Management;  Laterality: Left;    RADIOFREQUENCY THERMOCOAGULATION Right 06/02/2020    Procedure: RADIOFREQUENCY THERMAL COAGULATION--Right DR5+ lateral branches of S1, S2, S3;  Surgeon: Michelle Pineda Jr., MD;  Location: [facility] MGT;  Service: Pain Management;  Laterality: Right;    SENTINEL LYMPH NODE BIOPSY Right 05/09/2022    Procedure: BIOPSY, LYMPH NODE, SENTINEL-Right;  Surgeon: NEAL Dhillon MD;  Location: [facility] OR;  Service: General;  Laterality: Right;    SINUS SURGERY      Surgery on right knee  07/09/1982    TONSILLECTOMY      tumor removed from back left side upper shoulder  03/17/2006     Medications:   Current Outpatient Medications on File Prior to Visit   Medication Sig Dispense Refill    albuterol (PROVENTIL) 2.5 mg /3 mL (0.083 %) nebulizer solution Take 3 mLs (2.5 mg total) by nebulization every 6 (six) hours as needed for Wheezing. Rescue 90 each 2    albuterol (PROVENTIL/VENTOLIN HFA) 90 mcg/actuation inhaler USE 2 INHALATIONS BY MOUTH  4 TIMES DAILY AS NEEDED 34 g 3    amLODIPine (NORVASC) 5 MG tablet Take 1 tablet (5 mg total) by mouth 2 (two) times a day. 180 tablet 1    ascorbic acid, vitamin C, (VITAMIN C) 500 MG tablet Take 500 mg by mouth once daily.      aspirin (ECOTRIN) 81 MG EC tablet Take 81 mg by mouth once daily.      atorvastatin (LIPITOR) 80 MG tablet Take 1 tablet (80 mg total) by mouth once daily. 90 tablet 0    baclofen (LIORESAL) 20 MG tablet 1 tab po tid for muscle cramps. 90 tablet 2    benzonatate (TESSALON) 100 MG capsule Take 1 capsule (100 mg total) by mouth 3 (three) times daily as needed for Cough. 30 capsule 0    benztropine (COGENTIN) 0.5 MG tablet Take 1 tablet (0.5 mg total) by mouth 2 (two) times daily. 180 tablet 3    budesonide-formoterol 80-4.5 mcg (SYMBICORT) 80-4.5 mcg/actuation HFAA Inhale 2 puffs into the lungs 2 (two) times a day. 6.9 g 11    ciclopirox (PENLAC) 8 % Soln Apply topically nightly. Paint on affected nail(s) once per  night, remove residue once per week with alcohol 6.6 mL 3    cloNIDine (CATAPRES) 0.1 MG tablet 1 tab po q day for systolic BP > 160 or DBP >100. 30 tablet 1    exemestane (AROMASIN) 25 mg tablet Take 1 tablet (25 mg total) by mouth once daily. 30 tablet 11    fluticasone (VERAMYST) 27.5 mcg/actuation nasal spray 2 sprays by Nasal route as needed for Rhinitis.      folic acid (FOLVITE) 1 MG tablet Take 1 tablet (1,000 mcg total) by mouth once daily. 90 tablet 3    gabapentin (NEURONTIN) 100 MG capsule Take 1 capsule (100 mg total) by mouth 2 (two) times daily as needed (anxiety). 180 capsule 3    isosorbide mononitrate (IMDUR) 30 MG 24 hr tablet TAKE 1 TABLET(30 MG) BY MOUTH EVERY DAY 30 tablet 11    methotrexate 2.5 MG Tab Take 8 tablets (20 mg total) by mouth every 7 days. Take 4 tabs Mon am and 4 tabs Mon pm only 96 tablet 0    omega-3 fatty acids/fish oil (FISH OIL-OMEGA-3 FATTY ACIDS) 300-1,000 mg capsule Take by mouth once daily.      omeprazole (PRILOSEC) 40 MG capsule Take 1 capsule (40 mg total) by mouth every morning. 90 capsule 3    risperiDONE (RISPERDAL) 2 MG tablet TAKE 1 TABLET(2 MG) BY MOUTH EVERY MORNING 90 tablet 3    risperiDONE (RISPERDAL) 4 MG tablet Take 1 tablet (4 mg total) by mouth every evening. 90 tablet 3    venlafaxine (EFFEXOR-XR) 75 MG 24 hr capsule Take 1 capsule (75 mg total) by mouth once daily. 90 capsule 3    vitamin D (VITAMIN D3) 1000 units Tab Take 1,000 Units by mouth once daily.      vitamin E 200 UNIT capsule Take 200 Units by mouth once daily.      zinc gluconate 50 mg tablet Take 50 mg by mouth once daily.      [DISCONTINUED] amoxicillin-clavulanate 875-125mg (AUGMENTIN) 875-125 mg per tablet Take 1 tablet by mouth every 12 (twelve) hours. for 10 days 20 tablet 0    [DISCONTINUED] cyanocobalamin 500 MCG tablet Take 500 mcg by mouth once daily.      [DISCONTINUED] diclofenac (VOLTAREN) 25 MG TbEC Take 1 tablet (25 mg total) by mouth 2 (two) times daily. 30 tablet 0     [DISCONTINUED] diclofenac sodium (VOLTAREN) 1 % Gel Apply 2 g topically once daily. 200 g 0    [DISCONTINUED] fluorouraciL (EFUDEX) 5 % cream Use hs for 2 weeks 40 g 3    [DISCONTINUED] folic acid (FOLVITE) 1 MG tablet Take 1 tablet (1,000 mcg total) by mouth once daily. 90 tablet 3    [DISCONTINUED] gabapentin (NEURONTIN) 300 MG capsule Take 1 capsule (300 mg total) by mouth every evening. 90 capsule 3    [DISCONTINUED] hydrOXYzine HCL (ATARAX) 25 MG tablet Take 1 tablet (25 mg total) by mouth 2 (two) times daily as needed for Anxiety. 60 tablet 2    [DISCONTINUED] ibuprofen (ADVIL,MOTRIN) 600 MG tablet TAKE 1 TABLET(600 MG) BY MOUTH EVERY 6 HOURS AS NEEDED FOR PAIN 80 tablet 0    [DISCONTINUED] methotrexate 2.5 MG Tab Take 8 tablets (20 mg total) by mouth every 7 days. Take 4 tabs Mon am and 4 tabs Mon pm only 96 tablet 0    [DISCONTINUED] multivitamin (THERAGRAN) per tablet Take 1 tablet by mouth once daily.      [DISCONTINUED] traMADoL (ULTRAM) 50 mg tablet Take 1 tablet (50 mg total) by mouth every 6 (six) hours as needed for Pain. 30 each 0    [DISCONTINUED] VITAMIN A ORAL Take by mouth.       Current Facility-Administered Medications on File Prior to Visit   Medication Dose Route Frequency Provider Last Rate Last Admin    tropicamide 1% ophthalmic solution 1 drop  1 drop Right Eye On Call Procedure Karen Song MD   1 drop at 08/01/19 0648     H/o Asthma: as a child.   H/o Rhinitis: endorses    Drug Allergies:   Review of patient's allergies indicates:   Allergen Reactions    Etanercept Other (See Comments)     Other reaction(s): recurrent infections    Chloramphenicol sod succinate Hives    Codeine Other (See Comments)     Other reaction(s): Stomach upset. Pt states OK with Percocet    Nickel sutures [surgical stainless steel] Dermatitis     Allergic contact dermatitis    Adhesive Rash     Env/Occ:   Pets: dog and cat. She thinks the cat might trigger symptoms.    Social Hx:   She started smoking in  her 30's. She smokes about 5 cigarettes per day.  Social History     Tobacco Use    Smoking status: Every Day     Current packs/day: 0.00     Average packs/day: 0.3 packs/day for 10.0 years (2.5 ttl pk-yrs)     Types: Cigarettes     Start date: 1/10/2013     Last attempt to quit: 1/10/2023     Years since quittin.1     Passive exposure: Never    Smokeless tobacco: Former    Tobacco comments:     about 5 cig a day   Substance Use Topics    Alcohol use: No    Drug use: No     Family Hx:   Asthma: mother  Immune deficiency: unknown   Family History   Problem Relation Age of Onset    Colon cancer Mother     Psoriasis Mother     Cancer Mother         colon    Depression Mother     Cancer Father         lymphoma    Stroke Sister     Diabetes Brother     Arthritis Brother     Heart disease Brother         cad    No Known Problems Daughter     Hypertension Neg Hx     Suicide Neg Hx     Amblyopia Neg Hx     Blindness Neg Hx     Cataracts Neg Hx     Glaucoma Neg Hx     Macular degeneration Neg Hx     Retinal detachment Neg Hx     Strabismus Neg Hx         PHYSICAL EXAM     VS: /70 (BP Location: Left arm, Patient Position: Sitting, BP Method: Medium (Automatic))   Pulse 71   Wt 45.4 kg (100 lb 1.4 oz)   SpO2 97%   BMI 18.31 kg/m²   GENERAL: Alert, NAD, well-appearing  EYES: EOMI, no conjunctival injection, no discharge, no infraorbital shiners  EARS: external auditory canals normal B/L, TM normal B/L  NOSE: NT 2+ B/L, no stringing mucus, no polyps visualized  ORAL: MMM, no ulcers, no thrush  LUNGS: CTAB, no w/r/c, no increased WOB  HEART: RRR, normal S1/S2, no m/g/r  EXTREMITIES: No LE edema  DERM: no rashes  NEURO: normal speech, no facial asymmetry     LABORATORY STUDIES     Component      Latest Ref Rng 2024   IgG      650 - 1600 mg/dL 409 (L)    IgA      40 - 350 mg/dL 139    IgM      50 - 300 mg/dL 21 (L)      Component      Latest Ref Rng 2024   Pasatiempo Free Light Chains      0.33 - 1.94 mg/dL 1.72     Lambda Free Light Chains      0.57 - 2.63 mg/dL 1.66    Kappa/Lambda FLC Ratio      0.26 - 1.65  1.04      Component      Latest Ref Rng 2/21/2024   Protein, Serum      6.0 - 8.4 g/dL 6.0    Albumin grams/dl      3.35 - 5.55 g/dL 3.86    Alpha-1 grams/dl      0.17 - 0.41 g/dL 0.31    Alpha-2      0.43 - 0.99 g/dL 0.79    Beta      0.50 - 1.10 g/dL 0.65    Gamma      0.67 - 1.58 g/dL 0.39 (L)      Component      Latest Ref Rng 11/7/2023 1/25/2024 2/7/2024   WBC      3.90 - 12.70 K/uL 8.58  6.78  7.60    RBC      4.00 - 5.40 M/uL 3.91 (L)  4.33  4.27    Hemoglobin      12.0 - 16.0 g/dL 11.8 (L)  13.4  13.1    Hematocrit      37.0 - 48.5 % 36.2 (L)  40.6  42.3    MCV      82 - 98 fL 93  94  99 (H)    MCH      27.0 - 31.0 pg 30.2  30.9  30.7    MCHC      32.0 - 36.0 g/dL 32.6  33.0  31.0 (L)    RDW      11.5 - 14.5 % 17.6 (H)  18.7 (H)  18.8 (H)    Platelet Count      150 - 450 K/uL 270  309  346    MPV      9.2 - 12.9 fL 8.2 (L)  8.3 (L)  8.7 (L)    Immature Granulocytes      0.0 - 0.5 % 0.6 (H)  0.3  0.1    Gran # (ANC)      1.8 - 7.7 K/uL 6.1  5.4  5.2    Immature Grans (Abs)      0.00 - 0.04 K/uL 0.05 (H)  0.02  0.01    Lymph #      1.0 - 4.8 K/uL 1.4  0.9 (L)  1.6    Mono #      0.3 - 1.0 K/uL 0.7  0.2 (L)  0.6    Eos #      0.0 - 0.5 K/uL 0.3  0.2  0.2    Baso #      0.00 - 0.20 K/uL 0.05  0.03  0.04    Differential Method Automated  Automated  Automated    Sodium      136 - 145 mmol/L 136  140  137    Potassium      3.5 - 5.1 mmol/L 4.4  4.6  4.3    Chloride      95 - 110 mmol/L 99  104  103    CO2      23 - 29 mmol/L 28  24  27    Glucose      70 - 110 mg/dL 81  110  87    BUN      8 - 23 mg/dL 6 (L)  10  6 (L)    Creatinine      0.5 - 1.4 mg/dL 0.7  0.8  0.8    Calcium      8.7 - 10.5 mg/dL 9.1  9.0  9.2    PROTEIN TOTAL      6.0 - 8.4 g/dL 6.1  7.1  5.9 (L)    Albumin      3.5 - 5.2 g/dL 3.6  3.9  3.5    BILIRUBIN TOTAL      0.1 - 1.0 mg/dL 0.2  0.1  0.4    ALP      55 - 135 U/L 74  69  69    AST      10 -  40 U/L 17  21  22    ALT      10 - 44 U/L 12  12  16    Iron      30 - 160 ug/dL 67   93    Transferrin      200 - 375 mg/dL 301   227    TIBC      250 - 450 ug/dL 445   336    Saturated Iron      20 - 50 % 15 (L)   28    Sed Rate      0 - 36 mm/Hr <2   4    CRP      0.0 - 8.2 mg/L <0.3   0.8    Ferritin      20.0 - 300.0 ng/mL 15 (L)   132    BNP      0 - 99 pg/mL  14        ALLERGEN TESTING     Immunocaps: Ordered.     IMAGING & OTHER DIAGNOSTICS     CXR 1 view 1/25/24:  FINDINGS:  Heart size and pulmonary vascularity are within normal limits.  Mild tortuosity of the thoracic aorta and brachiocephalic vessels with atherosclerotic calcification in the wall of the aortic arch.  Lungs are satisfactorily expanded.  Mild accentuation of the interstitial pattern bilaterally.  No airspace consolidation or pleural effusion.  No pneumothorax.  Skeletal structures appear intact.  Thoracolumbar scoliosis.  Impression:  No acute cardiopulmonary disease and no significant interval change    CT sinuses 11/3/22:  Impression:  Acute appearing right maxillary sinusitis.  Prior right maxillary antral window.  Small area of sclerosis, and osseous lucency anterior mid maxilla along the midline incisive canal with punctate  dense material area suggestive of possible prior dental procedure.  There is also a small dense linear area of what appears to be cement, left to the midline within a small incisive canal, medial to the infra orbital canal, likely relate to a prior root canal treatment it is also unchanged from prior study no definite additional osseous erosive changes.      CHART REVIEW     Reviewed rheumatology notes, urgent care note, heme/onc note, ENT note, labs, imaging.      ASSESSMENT & PLAN     Bhavna Landry is a 68 y.o. female with     # Hypogammaglobulinemia; recurrent sinusitis: Low IgG and IgM. She has required antibiotics at least 4 times in the past year. She was previously on sulfasalazine for her psoriatic  arthritis, which can cause hypogam, but hasn't been on it since 2021 (and it isn't clear how long the hypogam effects would last). She received pneumovax 3 times (last in 10/2020), and prevnar-13 once (in 8/2018).   -rechecking total Ig's.   -checking pneumococcal titers, CBC/diff, and lymphocyte flow cytometry.  -if titers low, can consider giving prevnar-20.   -trial saline sinus rinses once to twice daily. Instructed to use distilled water for the saline.     # Rhinitis: In the past, symptoms would resolve between sinus infections, but symptoms have been more persistent since 11/2023.   -immunocaps for aeroallergens ordered.   -trial saline sinus rinses as above.  -start more consistent use of flonase 2 SEN per day.     # Wheezing and shortness of breath: Suspect COPD over asthma. Patient is a long time smoker. Total eos have been normal. She is using albuterol 3-4 nights per week. She has symbicort to use prn, but rarely uses it.   -recommending PFT's, but patient wants to wait until PFT's become available at the [facility] location.  -continue albuterol prn or symbicort 80 mcg prn (advised that the symbicort would give her longer lasting improvement).       Follow up: 2-3 months    I spent a total of 70 minutes on the day of the visit.  This includes face to face time and non-face to face time preparing to see the patient (eg, review of tests), obtaining and/or reviewing separately obtained history, documenting clinical information in the electronic or other health record.    Jeff Benítez MD  Allergy/Immunology

## 2024-02-23 NOTE — PROGRESS NOTES
Subjective:      Patient ID: Bhavna Landry is a 68 y.o. female.    Chief Complaint:  PsA; psoriasis; osteoporosis; OA    HPI feels well. No joint pain or swelling. No psoriasis. Still some neck pain despite PT and injections  Review of Systems   Constitutional:  Negative for appetite change, fatigue, fever and unexpected weight change.   HENT:  Negative for mouth sores.    Eyes:  Negative for visual disturbance.   Respiratory:  Negative for cough, shortness of breath and wheezing.    Cardiovascular:  Negative for chest pain and palpitations.   Gastrointestinal:  Negative for abdominal pain, anal bleeding, blood in stool, constipation, diarrhea, nausea and vomiting.   Genitourinary:  Negative for dysuria, frequency and urgency.   Musculoskeletal:  Negative for arthralgias, back pain, gait problem, joint swelling, myalgias, neck pain and neck stiffness.   Skin:  Negative for rash.   Neurological:  Negative for weakness, numbness and headaches.   Hematological:  Negative for adenopathy. Does not bruise/bleed easily.   Psychiatric/Behavioral:  Negative for sleep disturbance. The patient is not nervous/anxious.         Objective:   There were no vitals taken for this visit.  Physical Exam     2/7/2022 1/12/2023 5/23/2023 8/22/2023   Tender (CLARK-28) 10 / 28  10 / 28  0 / 28  0 / 28    Swollen (CLARK-28) 4 / 28  3 / 28  2 / 28  0 / 28    Provider Global 20 mm 10 mm 10 mm 0 mm   Patient Global 55 mm 50 mm 30 mm 75 mm   ESR 3 mm/hr 10 mm/hr 9 mm/hr 1 mm/hr   CRP 0.3 mg/L 0.5 mg/L 0.3 mg/L 1.9 mg/L   CLARK-28 (ESR) 3.87 (Moderate disease activity) 4.57 (Moderate disease activity) 2.35 (Remission) 1.05 (Remission)   CLARK-28 (CRP) 4.16 (Moderate disease activity) 4.06 (Moderate disease activity) 1.87 (Remission) 2.39 (Remission)   CDAI Score 21.5  19  6  7.5       Latest Reference Range & Units 02/07/24 10:05   WBC 3.90 - 12.70 K/uL 7.60   RBC 4.00 - 5.40 M/uL 4.27   Hemoglobin 12.0 - 16.0 g/dL 13.1   Hematocrit 37.0 - 48.5 % 42.3    MCV 82 - 98 fL 99 (H)   MCH 27.0 - 31.0 pg 30.7   MCHC 32.0 - 36.0 g/dL 31.0 (L)   RDW 11.5 - 14.5 % 18.8 (H)   Platelet Count 150 - 450 K/uL 346   MPV 9.2 - 12.9 fL 8.7 (L)   Gran % 38.0 - 73.0 % 68.2   Lymph % 18.0 - 48.0 % 20.9   Mono % 4.0 - 15.0 % 7.8   Eos % 0.0 - 8.0 % 2.5   Basophil % 0.0 - 1.9 % 0.5   Immature Granulocytes 0.0 - 0.5 % 0.1   Gran # (ANC) 1.8 - 7.7 K/uL 5.2   Lymph # 1.0 - 4.8 K/uL 1.6   Mono # 0.3 - 1.0 K/uL 0.6   Eos # 0.0 - 0.5 K/uL 0.2   Baso # 0.00 - 0.20 K/uL 0.04   Immature Grans (Abs) 0.00 - 0.04 K/uL 0.01   nRBC 0 /100 WBC 0   Differential Method  Automated   Iron 30 - 160 ug/dL 93   TIBC 250 - 450 ug/dL 336   Saturated Iron 20 - 50 % 28   Transferrin 200 - 375 mg/dL 227   Ferritin 20.0 - 300.0 ng/mL 132   Sed Rate 0 - 36 mm/Hr 4   Sodium 136 - 145 mmol/L 137   Potassium 3.5 - 5.1 mmol/L 4.3   Chloride 95 - 110 mmol/L 103   CO2 23 - 29 mmol/L 27   Anion Gap 8 - 16 mmol/L 7 (L)   BUN 8 - 23 mg/dL 6 (L)   Creatinine 0.5 - 1.4 mg/dL 0.8   eGFR >60 mL/min/1.73 m^2 >60.0   Glucose 70 - 110 mg/dL 87   Calcium 8.7 - 10.5 mg/dL 9.2   ALP 55 - 135 U/L 69   PROTEIN TOTAL 6.0 - 8.4 g/dL 5.9 (L)   Albumin 3.5 - 5.2 g/dL 3.5   BILIRUBIN TOTAL 0.1 - 1.0 mg/dL 0.4   AST 10 - 40 U/L 22   ALT 10 - 44 U/L 16   CRP 0.0 - 8.2 mg/L 0.8   (H): Data is abnormally high  (L): Data is abnormally low        Impression   =========     Left Leg: Duplex imaging of the left lower extremity veins demonstrates patent and compressible veins. There is no evidence of a   venous thrombosis in the deep or superficial veins.       Right Leg: CFV patent.       DATE OF SERVICE: 08/22/2023                                                       Sonographer: DIAZ GATES [facility]   Electronically Signed by: SLIME Johnson M.D. at 08/22/2023-13:02      Assessment:   Cervical demyelination with etanercept in past, no anti-TNF  Failed secukinumab  Failed sulfasalazine  Did not feel tofacitinib helped and stopped after visit of 2/7/22 and  not refilled( 22), but patient apparently has been getting automatic refills without refill approvals.   Guselkumab ordered 22 never filled        PsA  TJ  0 SJ 0 Pt global 15 ESR 4  CRP 0.8  DAS28 1  NPL66-YLN 1.2  CDAI 0.2 (all remission)  DAPSA  TJ 0 SJ  0 Pt global 1.5 Pt pain 4  CRP  0.08 = 5.58(LDA) ) no need to add guselkumab just now.   Osteoporosis DXA 22 FRAX major 13.5 % hip 3.1%, s/p hip fracture;received  Reclast zoledronic acid(5mg IV) 20, 3/17/22. Now on Zometa (zoledronic acid) (4mg IV) q 6 months for breast cancer from Dr. Marcelino  so no need for Reclast  most  recent dose 23  Psoriasis, tiny lesions heels only  OA hands  Breast cancer on anastrozole  EH   Cervical spondylosis, neck pain  hypogammaglobulinemia    Plan:   To see Dr. Benítez Allergy Immunology today for hypogamaglobulinemia  methotrexate to 20mg po every Monday, divided dose  Folic acid 1mg daily  Continue Zometa 4mg q 6 months per Dr. Marcelino in Heme-Onc  No  need to add guselkumab 100mg sc wk 0,4 then q 8 wks. If OK with Dr. Marcelino her oncologist   DXA > 24  Standing labs q 3 months'  RTC 6 months

## 2024-02-23 NOTE — PATIENT INSTRUCTIONS
For your frequent sinus infections, I have ordered additional labs to look at your immune system. I have also ordered environmental allergy testing with the labs.     For your nasal/sinus symptoms, I recommend trying saline sinus rinses. You can buy a [employer] Sinus Rinse kit over the counter with the saline packets. Be sure to use distilled water when you mix the saline (not tap water). You can use the sinus rinses once to twice daily.  I also recommend increasing your flonase to 2 sprays each nostril every day. You can use this after the sinus rinse.    I would like to follow up with you in 2-3 months.   I also recommend you schedule follow up with your ENT (Dr. Goode).

## 2024-02-25 DIAGNOSIS — R29.818 EXTRAPYRAMIDAL SYMPTOM: ICD-10-CM

## 2024-02-26 ENCOUNTER — TELEPHONE (OUTPATIENT)
Dept: OTOLARYNGOLOGY | Facility: CLINIC | Age: 69
End: 2024-02-26
Payer: MEDICARE

## 2024-02-26 ENCOUNTER — LAB VISIT (OUTPATIENT)
Dept: LAB | Facility: HOSPITAL | Age: 69
End: 2024-02-26
Attending: STUDENT IN AN ORGANIZED HEALTH CARE EDUCATION/TRAINING PROGRAM
Payer: MEDICARE

## 2024-02-26 DIAGNOSIS — J31.0 RHINITIS, UNSPECIFIED TYPE: ICD-10-CM

## 2024-02-26 DIAGNOSIS — Z86.19 FREQUENT INFECTIONS: ICD-10-CM

## 2024-02-26 DIAGNOSIS — D80.1 HYPOGAMMAGLOBULINEMIA: ICD-10-CM

## 2024-02-26 LAB
BASOPHILS # BLD AUTO: 0.05 K/UL (ref 0–0.2)
BASOPHILS NFR BLD: 0.7 % (ref 0–1.9)
DIFFERENTIAL METHOD BLD: ABNORMAL
EOSINOPHIL # BLD AUTO: 0.1 K/UL (ref 0–0.5)
EOSINOPHIL NFR BLD: 1.3 % (ref 0–8)
ERYTHROCYTE [DISTWIDTH] IN BLOOD BY AUTOMATED COUNT: 18 % (ref 11.5–14.5)
HCT VFR BLD AUTO: 39.6 % (ref 37–48.5)
HGB BLD-MCNC: 12.5 G/DL (ref 12–16)
IGA SERPL-MCNC: 128 MG/DL (ref 40–350)
IGE SERPL-ACNC: <35 IU/ML (ref 0–100)
IGG SERPL-MCNC: 364 MG/DL (ref 650–1600)
IGM SERPL-MCNC: 20 MG/DL (ref 50–300)
IMM GRANULOCYTES # BLD AUTO: 0.03 K/UL (ref 0–0.04)
IMM GRANULOCYTES NFR BLD AUTO: 0.4 % (ref 0–0.5)
LYMPHOCYTES # BLD AUTO: 1.5 K/UL (ref 1–4.8)
LYMPHOCYTES NFR BLD: 19.4 % (ref 18–48)
MCH RBC QN AUTO: 30.6 PG (ref 27–31)
MCHC RBC AUTO-ENTMCNC: 31.6 G/DL (ref 32–36)
MCV RBC AUTO: 97 FL (ref 82–98)
MONOCYTES # BLD AUTO: 0.7 K/UL (ref 0.3–1)
MONOCYTES NFR BLD: 8.8 % (ref 4–15)
NEUTROPHILS # BLD AUTO: 5.2 K/UL (ref 1.8–7.7)
NEUTROPHILS NFR BLD: 69.4 % (ref 38–73)
NRBC BLD-RTO: 0 /100 WBC
PATHOLOGIST INTERPRETATION IFE: NORMAL
PATHOLOGIST INTERPRETATION SPE: NORMAL
PLATELET # BLD AUTO: 329 K/UL (ref 150–450)
PMV BLD AUTO: 8.8 FL (ref 9.2–12.9)
RBC # BLD AUTO: 4.08 M/UL (ref 4–5.4)
WBC # BLD AUTO: 7.47 K/UL (ref 3.9–12.7)

## 2024-02-26 PROCEDURE — 86355 B CELLS TOTAL COUNT: CPT | Performed by: STUDENT IN AN ORGANIZED HEALTH CARE EDUCATION/TRAINING PROGRAM

## 2024-02-26 PROCEDURE — 82785 ASSAY OF IGE: CPT | Performed by: STUDENT IN AN ORGANIZED HEALTH CARE EDUCATION/TRAINING PROGRAM

## 2024-02-26 PROCEDURE — 85025 COMPLETE CBC W/AUTO DIFF WBC: CPT | Performed by: STUDENT IN AN ORGANIZED HEALTH CARE EDUCATION/TRAINING PROGRAM

## 2024-02-26 PROCEDURE — 86003 ALLG SPEC IGE CRUDE XTRC EA: CPT | Performed by: STUDENT IN AN ORGANIZED HEALTH CARE EDUCATION/TRAINING PROGRAM

## 2024-02-26 PROCEDURE — 86359 T CELLS TOTAL COUNT: CPT | Performed by: STUDENT IN AN ORGANIZED HEALTH CARE EDUCATION/TRAINING PROGRAM

## 2024-02-26 PROCEDURE — 86360 T CELL ABSOLUTE COUNT/RATIO: CPT | Performed by: STUDENT IN AN ORGANIZED HEALTH CARE EDUCATION/TRAINING PROGRAM

## 2024-02-26 PROCEDURE — 82784 ASSAY IGA/IGD/IGG/IGM EACH: CPT | Mod: 59 | Performed by: STUDENT IN AN ORGANIZED HEALTH CARE EDUCATION/TRAINING PROGRAM

## 2024-02-26 PROCEDURE — 86357 NK CELLS TOTAL COUNT: CPT | Performed by: STUDENT IN AN ORGANIZED HEALTH CARE EDUCATION/TRAINING PROGRAM

## 2024-02-26 PROCEDURE — 86317 IMMUNOASSAY INFECTIOUS AGENT: CPT | Mod: 59 | Performed by: STUDENT IN AN ORGANIZED HEALTH CARE EDUCATION/TRAINING PROGRAM

## 2024-02-26 PROCEDURE — 36415 COLL VENOUS BLD VENIPUNCTURE: CPT | Mod: PO | Performed by: STUDENT IN AN ORGANIZED HEALTH CARE EDUCATION/TRAINING PROGRAM

## 2024-02-26 PROCEDURE — 86003 ALLG SPEC IGE CRUDE XTRC EA: CPT | Mod: 59 | Performed by: STUDENT IN AN ORGANIZED HEALTH CARE EDUCATION/TRAINING PROGRAM

## 2024-02-26 RX ORDER — BENZTROPINE MESYLATE 0.5 MG/1
0.5 TABLET ORAL 2 TIMES DAILY
Qty: 180 TABLET | Refills: 3 | Status: SHIPPED | OUTPATIENT
Start: 2024-02-26

## 2024-02-26 NOTE — TELEPHONE ENCOUNTER
"----- Message from Thunila Barrett sent at 2/26/2024  9:33 AM CST -----  Regarding: appt access  Contact: [phone]  Name Of Caller: self     Contact Preference?:  [phone]     What is the nature of the call?: pt would like to be seen asap at the [place] location for nasal drainage. She states that it is causing her a sore throat. She states that her allergist states to make an appt asap     Additional Notes:    "Thank you for all that you do for our patients"        "

## 2024-02-26 NOTE — TELEPHONE ENCOUNTER
"Called the pt to assist with scheduling an appointment but the pt didn't answer left voicemail for pt to give the clinic a call back for an appointment.      ----- Message from Thu Barrett sent at 2/26/2024  9:33 AM CST -----  Regarding: appt access  Contact: [phone]  Name Of Caller: self     Contact Preference?:  [phone]     What is the nature of the call?: pt would like to be seen asap at the [place] location for nasal drainage. She states that it is causing her a sore throat. She states that her allergist states to make an appt asap     Additional Notes:    "Thank you for all that you do for our patients"        "

## 2024-02-27 LAB
CD3+CD4+ CELLS # BLD: 827 CELLS/UL (ref 300–1400)
CD3+CD4+ CELLS NFR BLD: 57.7 % (ref 28–57)
LYMPHOCYTES NFR CSF MANUAL: 1081 CELLS/UL (ref 700–2100)
LYMPHOCYTES NFR CSF MANUAL: 18.4 % (ref 7–31)
LYMPHOCYTES NFR CSF MANUAL: 2.6 % (ref 0.9–3.6)
LYMPHOCYTES NFR CSF MANUAL: 22.2 % (ref 10–39)
LYMPHOCYTES NFR CSF MANUAL: 264 CELLS/UL (ref 90–600)
LYMPHOCYTES NFR CSF MANUAL: 318 CELLS/UL (ref 200–900)
LYMPHOCYTES NFR CSF MANUAL: 5.8 % (ref 6–19)
LYMPHOCYTES NFR CSF MANUAL: 75.5 % (ref 55–83)
LYMPHOCYTES NFR CSF MANUAL: 83 CELLS/UL (ref 100–500)

## 2024-02-28 ENCOUNTER — OFFICE VISIT (OUTPATIENT)
Dept: OTOLARYNGOLOGY | Facility: CLINIC | Age: 69
End: 2024-02-28
Payer: MEDICARE

## 2024-02-28 VITALS
DIASTOLIC BLOOD PRESSURE: 68 MMHG | SYSTOLIC BLOOD PRESSURE: 172 MMHG | BODY MASS INDEX: 18.35 KG/M2 | WEIGHT: 100.31 LBS | HEART RATE: 73 BPM

## 2024-02-28 DIAGNOSIS — J34.89 SILENT SINUS SYNDROME: ICD-10-CM

## 2024-02-28 DIAGNOSIS — R05.2 SUBACUTE COUGH: Primary | ICD-10-CM

## 2024-02-28 DIAGNOSIS — J32.0 CHRONIC MAXILLARY SINUSITIS: ICD-10-CM

## 2024-02-28 DIAGNOSIS — F17.210 TOBACCO DEPENDENCE DUE TO CIGARETTES: ICD-10-CM

## 2024-02-28 DIAGNOSIS — J31.0 CHRONIC RHINITIS: ICD-10-CM

## 2024-02-28 PROCEDURE — 31575 DIAGNOSTIC LARYNGOSCOPY: CPT | Mod: S$GLB,,, | Performed by: PHYSICIAN ASSISTANT

## 2024-02-28 PROCEDURE — 99999 PR PBB SHADOW E&M-EST. PATIENT-LVL V: CPT | Mod: PBBFAC,,, | Performed by: PHYSICIAN ASSISTANT

## 2024-02-28 PROCEDURE — 99214 OFFICE O/P EST MOD 30 MIN: CPT | Mod: 25,S$GLB,, | Performed by: PHYSICIAN ASSISTANT

## 2024-02-28 RX ORDER — BENZONATATE 100 MG/1
100 CAPSULE ORAL 3 TIMES DAILY PRN
Qty: 28 CAPSULE | Refills: 0 | Status: SHIPPED | OUTPATIENT
Start: 2024-02-28 | End: 2024-03-09

## 2024-02-28 NOTE — PROGRESS NOTES
Subjective:     HPI: Bhavna Landry is a 68 y.o. female with history of breast cancer, psoriatic arthritis,  hypogammaglobulinemia, chronic sinusitis, silent sinus syndrome s/p sinus surgery and osteoporosis self-referred for sinusitis.    Patient reports developing sore throat 3-4 weeks ago with associated postnasal drip, cough, left facial pressure, congestion, and globus sensation.  Patient denies sick contacts but does report several other people with similar symptoms that started after hers.  Patient reports symptoms are worse at night and lying down but does not usually wake her up.  Rx Augmentin, tessalon perles 2/13/24 by Urgent Care.     Triggers for the cough include the following:   - voice use:  no  - breathing heavily:  no  - laughing:  no  - eating:  no  - drinking cold liquids:  no  - strong odors:  no  - post-prandial:  no  - lying down:  yes    Taking an ARB/ACEI: no    Prior workup includes the following:  - pulmonary:  no  - GI:  no  - allergy:  no  - ENT:  no    Current sinonasal medications include none at present.  The last course of antibiotics was recently.    She recalls previously having allergy testing, which was reportedly all negative.  She denies a history of asthma.  She relates a history of reflux symptoms which is currently managed with omeprazole 40 mg PO daily.    She denies a diagnosis of obstructive sleep apnea.   She does not recall a prior history of nasal trauma.  She has previously had sinonasal surgery S/p R ESS (maxillary, in office) 5/11/21  She has had a tonsillectomy.  She is a tobacco smoker.       Past Medical/Past Surgical History  Past Medical History:   Diagnosis Date    Allergy     Amblyopia     Anemia     Anticoagulant long-term use     Arthritis 02/02/1992    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, estrogen receptor positive 04/13/2022    Cataract     Depression     Dry eyes     Dry mouth     Duane's syndrome of right eye     Early dry stage  nonexudative age-related macular degeneration of both eyes 2022    Fibromyalgia 2014    Fibromyalgia     Fractured hip     RIGHT HIP    GERD (gastroesophageal reflux disease)     History of psychiatric hospitalization     Hyperlipidemia 1992    Hypertension     Hypocalcemia     Hyponatremia     Kidney stone     Migraine headache     Osteoporosis     Pressure ulcer of unspecified site, unspecified stage     Psoriatic arthritis 1992    Recurrent upper respiratory infection (URI)     Right knee pain     post knee replacement surgery (possible rejectiion of metal)    RLS (restless legs syndrome)     Schizophrenia 1992    stable on meds    Sciatica     Squamous cell carcinoma of skin     Urinary tract infection      She has a past surgical history that includes Surgery on right knee (1982); cyst removed from right sinus (1982); tumor removed from back left side upper shoulder (2006); Hysterectomy; Knee surgery;  section; Joint replacement (Right); Colonoscopy (N/A, 2016); Injection of anesthetic agent into sacroiliac joint (Right, 2018); Injection of anesthetic agent into sacroiliac joint (Bilateral, 2018); Injection of anesthetic agent into sacroiliac joint (Bilateral, 2019); brow ptosis repair (Right, 2019); Injection of anesthetic agent around medial branch nerves innervating lumbar facet joint (N/A, 2019); Radiofrequency thermocoagulation (Right, 2019); Radiofrequency thermocoagulation (Left, 2019); Phacoemulsification of cataract (Right, 2019); Intraocular prosthesis insertion (Right, 2019); Cataract extraction; Phacoemulsification of cataract (Left, 2019); Intraocular prosthesis insertion (Left, 2019); Radiofrequency thermocoagulation (Right, 10/22/2019); Radiofrequency thermocoagulation (Left, 10/29/2019); Radiofrequency thermocoagulation (Left, 2020); Radiofrequency  thermocoagulation (Right, 06/02/2020); Mastectomy (Right, 05/09/2022); [place] lymph node biopsy (Right, 05/09/2022); Injection for sentinel node identification (Right, 05/09/2022); Colonoscopy (N/A, 12/09/2022); Esophagogastroduodenoscopy (N/A, 02/07/2023); Injection of anesthetic agent around medial branch nerves innervating lumbar facet joint (Bilateral, 05/03/2023); Epidural steroid injection into lumbar spine (N/A, 08/02/2023); Epidural steroid injection into cervical spine (N/A, 12/08/2023); Tonsillectomy; and Sinus surgery.    Family History/Social History  Her family history includes Arthritis in her brother; Cancer in her father and mother; Colon cancer in her mother; Depression in her mother; Diabetes in her brother; Heart disease in her brother; No Known Problems in her daughter; Psoriasis in her mother; Stroke in her sister.  She reports that she has been smoking cigarettes. She started smoking about 11 years ago. She has a 2.5 pack-year smoking history. She has never been exposed to tobacco smoke. She has quit using smokeless tobacco. She reports that she does not drink alcohol and does not use drugs.    Allergies/Immunizations  She is allergic to etanercept, chloramphenicol sod succinate, codeine, nickel sutures [surgical stainless steel], and adhesive.  Immunization History   Administered Date(s) Administered    COVID-19 MRNA, LN-S PF (MODERNA HALF 0.25 ML DOSE) 04/01/2022    COVID-19, MRNA, LN-S, PF (MODERNA FULL 0.5 ML DOSE) 02/19/2021, 03/19/2021, 09/24/2021    COVID-19, mRNA, LNP-S, PF (Moderna 2023)Ages 12+ 10/17/2023    Influenza 01/14/2014, 09/16/2016    Influenza (FLUAD) - Quadrivalent - Adjuvanted - PF *Preferred* (65+) 11/17/2021    Influenza - High Dose - PF (65 years and older) 10/06/2018, 10/24/2019    Influenza - Quadrivalent 10/20/2015    Influenza - Quadrivalent - High Dose - PF (65 years and older) 10/03/2020, 09/23/2022, 09/14/2023    Influenza - Quadrivalent - PF *Preferred* (6  months and older) 12/11/2014, 10/30/2017    Influenza - Trivalent (ADULT) 01/14/2014    Influenza - Trivalent - PF (ADULT) 09/16/2016    Influenza Split 01/14/2014    Pneumococcal Conjugate - 13 Valent 08/15/2018    Pneumococcal Polysaccharide - 23 Valent 01/14/2014, 11/26/2019, 10/03/2020    RSVpreF (Arexvy) 09/14/2023    Tdap 04/18/2007, 07/17/2017    Zoster 12/28/2015, 07/25/2016    Zoster Recombinant 10/12/2020, 01/02/2021        Medications   albuterol  amLODIPine  ascorbic acid (vitamin C)  aspirin  atorvastatin  baclofen  benztropine  ciclopirox Soln  cloNIDine  exemestane  fish oil-omega-3 fatty acids  fluticasone  folic acid  isosorbide mononitrate  methotrexate Tab  omeprazole  risperiDONE  SYMBICORT Hfaa  tropicamide 1%  venlafaxine  vitamin D Tab  vitamin E  zinc gluconate     Review of Systems     Constitutional: Negative for chills, fatigue and fever.      HENT: Positive for postnasal drip, runny nose and sore throat.  Negative for trouble swallowing.      Respiratory:  Negative for shortness of breath and wheezing.            Objective:     BP (!) 172/68 (BP Location: Left arm, Patient Position: Sitting, BP Method: Large (Automatic))   Pulse 73   Wt 45.5 kg (100 lb 5 oz)   BMI 18.35 kg/m²        Constitutional:   She appears well-developed and well-nourished. She is active. Normal speech.      Head:  Normocephalic and atraumatic.     Nose:  No mucosal edema or rhinorrhea. No turbinate hypertrophy.  Right sinus exhibits no maxillary sinus tenderness and no frontal sinus tenderness. Left sinus exhibits no maxillary sinus tenderness and no frontal sinus tenderness.     Mouth/Throat  Oropharynx clear and moist without lesions or asymmetry and normal uvula midline. She has dentures. No uvula swelling. No oropharyngeal exudate, posterior oropharyngeal edema or posterior oropharyngeal erythema. Tonsils not present.      Neck:  No thyroid mass and no thyromegaly present.     She has no cervical adenopathy.      Pulmonary/Chest:   Effort normal.     Psychiatric:   She has a normal mood and affect. Her speech is normal and behavior is normal.       Procedure    Flexible laryngoscopy performed.  See procedure note.    Data Reviewed  I personally reviewed the chart, including any outside records, and pertinent data below:    I reviewed the following notes: ENT, primary care, ED    WBC (K/uL)   Date Value   02/26/2024 7.47     Eosinophil % (%)   Date Value   02/26/2024 1.3     Eos, Fluid (%)   Date Value   05/05/2014 5     Eos # (K/uL)   Date Value   02/26/2024 0.1     Platelets (K/uL)   Date Value   02/26/2024 329     Glucose (mg/dL)   Date Value   02/07/2024 87     Total IgE (IU/mL)   Date Value   02/26/2024 <35     Pathology report indicated non-eosinophilic chronic inflammation.  Cultures showed no growth at last 2 visits.     I independently reviewed the images of the CT sinuses dated 11/3/22. Pertinent findings include R max antrostomy with hypoplastic R max sinus and mucosal thickening. .    Assessment & Plan:     1. Subacute cough  2. Chronic rhinitis  -     suspect viral infection preceding symptoms.  Patient now with a post viral cough possibly exacerbated by tobacco use, GERD, and chronic rhinosinusitis.  - laryngoscopy without any concerning findings.  Does show some rhinitis without purulence, masses, or polyps.  - advised starting saline rinses  - advised adding Pepcid at night and continue PPI in the morning  - continue Symbicort  -     benzonatate (TESSALON) 100 MG capsule; Take 1 capsule (100 mg total) by mouth 3 (three) times daily as needed for Cough.  Dispense: 28 capsule; Refill: 0    3. Chronic maxillary sinusitis  4. Silent sinus syndrome   - s/p sinus surgery; sinusitis localized to that sinus based on prior CT imaging   - no concerning findings for bacterial sinusitis  5. Tobacco dependence  - patient with extensive prior documentation of COPD/emphysema but no formal evaluation by pulmonology [facility].   Furthermore, patient with CT chest back in 2018 which showed no evidence of emphysematous changes to the lungs.  Despite this patient is a every day smoker and should undergo formal evaluation/screening by pulmonologist.  - Ambulatory referral/consult to Pulmonology; Future; Expected date: 03/06/202    She will Follow up if symptoms worsen or fail to improve.  I had a discussion with the patient regarding her condition and the further workup and management options.    All questions were answered, and the patient is in agreement with the above.     Disclaimer:  This note may have been prepared utilizing voice recognition software which may result in occasional typographical errors in the text such as sound alike words.   If further clarification is needed, please contact the ENT department of [facility].

## 2024-02-28 NOTE — PROCEDURES
"Laryngoscopy    Date/Time: 2/28/2024 3:30 PM    Performed by: Kb Mayer PA-C  Authorized by: Kb Mayer PA-C    Time out: Immediately prior to procedure a "time out" was called to verify the correct patient, procedure, equipment, support staff and site/side marked as required.    Anesthesia:     Local anesthetic:  Lidocaine 4% and Chad-Synephrine 1/2%    Patient tolerance:  Patient tolerated the procedure well with no immediate complications  Laryngoscopy:     Areas examined:  Nasal cavities, nasopharynx, oropharynx, hypopharynx, larynx and vocal cords    Laryngoscope size:  4 mm  Nose Intranasal:      Mucosa no polyps     Mucosa ulcers not present     No mucosa lesions present     No septum gross deformity     Turbinates not enlarged  Nasopharynx:      No mucosa lesions     Adenoids not present     Posterior choanae patent     Eustachian tube patent  Larynx/hypopharynx:      No epiglottis lesions     No epiglottis edema     No AE folds lesions     No vocal cord polyps     Equal and normal bilateral     No hypopharynx lesions     No piriform sinus pooling     No piriform sinus lesions     No post cricoid edema     No post cricoid erythema     Thick mucous, clear bilterally  R MT with dried mucous anteriorly  R max antrostomy changes without purulence    "

## 2024-02-28 NOTE — PATIENT INSTRUCTIONS
Please contact central scheduling at [phone] or [phone] to schedule your appointment if you do not hear from Pulmonology service within the next week.    NeilMed Sinus rinse   Try purchasing this nasal rinse below.  Its over the counter and can use several times daily without any side effects, but please use at least 1-2 times daily.  Use it before applying your nasal spray medications.  This spray can help wash away mucus and crusting and allow for better absorption of the medications.  Please use the nasal saline spray about 15 minutes before applying your medicated nasal sprays. This bottle uses distilled water (NOT tap water).  The Instructions in the box are detailed so please read them before using.     Take 20 mg of pepcid at night for the next three weeks  Continue the omeprazole

## 2024-02-29 ENCOUNTER — TELEPHONE (OUTPATIENT)
Dept: ALLERGY | Facility: CLINIC | Age: 69
End: 2024-02-29
Payer: MEDICARE

## 2024-02-29 ENCOUNTER — DOCUMENTATION ONLY (OUTPATIENT)
Dept: ALLERGY | Facility: CLINIC | Age: 69
End: 2024-02-29
Payer: MEDICARE

## 2024-02-29 LAB
A ALTERNATA IGE QN: <0.1 KU/L
A FUMIGATUS IGE QN: <0.1 KU/L
BERMUDA GRASS IGE QN: <0.1 KU/L
C HERBARUM IGE QN: <0.1 KU/L
C LUNATA IGE QN: <0.1 KU/L
CAT DANDER IGE QN: <0.1 KU/L
CEDAR IGE QN: <0.1 KU/L
D FARINAE IGE QN: <0.1 KU/L
D PTERONYSS IGE QN: <0.1 KU/L
DEPRECATED A ALTERNATA IGE RAST QL: NORMAL
DEPRECATED A FUMIGATUS IGE RAST QL: NORMAL
DEPRECATED BERMUDA GRASS IGE RAST QL: NORMAL
DEPRECATED C HERBARUM IGE RAST QL: NORMAL
DEPRECATED C LUNATA IGE RAST QL: NORMAL
DEPRECATED CAT DANDER IGE RAST QL: NORMAL
DEPRECATED CEDAR IGE RAST QL: NORMAL
DEPRECATED D FARINAE IGE RAST QL: NORMAL
DEPRECATED D PTERONYSS IGE RAST QL: NORMAL
DEPRECATED DOG DANDER IGE RAST QL: NORMAL
DEPRECATED ELDER IGE RAST QL: NORMAL
DEPRECATED ENGL PLANTAIN IGE RAST QL: NORMAL
DEPRECATED P NOTATUM IGE RAST QL: NORMAL
DEPRECATED PECAN/HICK TREE IGE RAST QL: NORMAL
DEPRECATED ROACH IGE RAST QL: NORMAL
DEPRECATED TIMOTHY IGE RAST QL: NORMAL
DEPRECATED WEST RAGWEED IGE RAST QL: NORMAL
DEPRECATED WHITE OAK IGE RAST QL: NORMAL
DOG DANDER IGE QN: <0.1 KU/L
ELDER IGE QN: <0.1 KU/L
ENGL PLANTAIN IGE QN: <0.1 KU/L
P NOTATUM IGE QN: <0.1 KU/L
PECAN/HICK TREE IGE QN: <0.1 KU/L
ROACH IGE QN: <0.1 KU/L
TIMOTHY IGE QN: <0.1 KU/L
WEST RAGWEED IGE QN: <0.1 KU/L
WHITE OAK IGE QN: <0.1 KU/L

## 2024-02-29 NOTE — TELEPHONE ENCOUNTER
----- Message from Kalee Beatty sent at 2/29/2024  9:03 AM CST -----  Regarding: Results  Contact: Pt @[phone]  Pt is calling to speak to someone in the office to discuss test results. Pt is asking for a return call soon. Thanks.         Who Called: Pt         Test Results for:Labs

## 2024-02-29 NOTE — TELEPHONE ENCOUNTER
Good morning Dr. Benítez,    Kindly note I left a message for the patient informing her that the labs were drawn on the 26th February so she does not need the appointment for tomorrow March 1st.    Kind regards  Rahul Leong MA             ----- Message from Kalee Beatty sent at 2/29/2024  9:03 AM CST -----  Regarding: Results  Contact: Pt @[phone]  Pt is calling to speak to someone in the office to discuss test results. Pt is asking for a return call soon. Thanks.         Who Called: Pt         Test Results for:Labs

## 2024-02-29 NOTE — PROGRESS NOTES
Following a message received from the patient, I rang her three times and left a message for her to make contact with the clinic to ascertain if she was able to have her bloods drawn at [facility], as stated on the portal the bloods are in process.  I am awaiting a call back.

## 2024-02-29 NOTE — PROGRESS NOTES
I rang the [facility] , and spoke with Tia who says the patient had her labs drawn on February 26th.

## 2024-03-01 ENCOUNTER — PATIENT MESSAGE (OUTPATIENT)
Dept: ALLERGY | Facility: CLINIC | Age: 69
End: 2024-03-01
Payer: MEDICARE

## 2024-03-01 ENCOUNTER — TELEPHONE (OUTPATIENT)
Dept: ALLERGY | Facility: CLINIC | Age: 69
End: 2024-03-01
Payer: MEDICARE

## 2024-03-01 DIAGNOSIS — D83.9 CVID (COMMON VARIABLE IMMUNODEFICIENCY): ICD-10-CM

## 2024-03-01 DIAGNOSIS — Z86.19 FREQUENT INFECTIONS: Primary | ICD-10-CM

## 2024-03-01 LAB
IMMUNOLOGIST REVIEW: NORMAL
S PN DA SERO 19F IGG SER-MCNC: 1 MCG/ML
S PNEUM DA 1 IGG SER-MCNC: 2.1 MCG/ML
S PNEUM DA 10A IGG SER-MCNC: 5.7 MCG/ML
S PNEUM DA 11A IGG SER-MCNC: 0.1 MCG/ML
S PNEUM DA 12F IGG SER-MCNC: <0.1 MCG/ML
S PNEUM DA 14 IGG SER-MCNC: 0.2 MCG/ML
S PNEUM DA 15B IGG SER-MCNC: 0.1 MCG/ML
S PNEUM DA 17F IGG SER-MCNC: 0.3 MCG/ML
S PNEUM DA 18C IGG SER-MCNC: 0.9 MCG/ML
S PNEUM DA 19A IGG SER-MCNC: 3.5 MCG/ML
S PNEUM DA 2 IGG SER-MCNC: 0.3 MCG/ML
S PNEUM DA 20A IGG SER-MCNC: 0.9 MCG/ML
S PNEUM DA 22F IGG SER-MCNC: 0.1 MCG/ML
S PNEUM DA 23F IGG SER-MCNC: 0.2 MCG/ML
S PNEUM DA 3 IGG SER-MCNC: <0.1 MCG/ML
S PNEUM DA 33F IGG SER-MCNC: 0.2 MCG/ML
S PNEUM DA 4 IGG SER-MCNC: 0.1 MCG/ML
S PNEUM DA 5 IGG SER-MCNC: 2 MCG/ML
S PNEUM DA 6B IGG SER-MCNC: 0.2 MCG/ML
S PNEUM DA 7F IGG SER-MCNC: 1.6 MCG/ML
S PNEUM DA 8 IGG SER-MCNC: 0.2 MCG/ML
S PNEUM DA 9N IGG SER-MCNC: 0.1 MCG/ML
S PNEUM DA 9V IGG SER-MCNC: 0.1 MCG/ML

## 2024-03-01 NOTE — TELEPHONE ENCOUNTER
"----- Message from Jeff Benítez MD sent at 3/1/2024  4:39 PM CST -----  Regarding: RE: pt advice  Contact: [phone]  Thanks.   She does have low pneumococcal titers. I sent her a portal message explaining results. Can you please call or message her to schedule a nurse visit for PREVNAR-20. She should get repeat pneumococcal titer labs 4-6 weeks after the vaccine. I have put in orders for both the vaccine and the labs.    Thanks,  Jeff Benítez MD  Allergy/Immunology      ----- Message -----  From: Rahul Leong MA  Sent: 3/1/2024   9:28 AM CST  To: Jeff Benítez MD  Subject: FW: pt advice                                    Good morning ,    Kindly find attached message for your review and further advice.    Please let me know if and how I can assit you with this.    Kind Regards,  Rhaul Leong MA     ----- Message -----  From: Thu Barrett  Sent: 3/1/2024   8:40 AM CST  To: [facility] Staff  Subject: pt advice                                        Name Of Caller: self     Contact Preference?:  [phone]     What is the nature of the call?: pt is requesting a call back regarding her immune system is low do to the Rx Exemestane 25mg that she takes for cancer. She would like to know will she be receiving the shot for her immune system. She states that all her lab work was negative     Additional Notes:    "Thank you for all that you do for our patients"            "

## 2024-03-01 NOTE — TELEPHONE ENCOUNTER
"Injection scheduled for March 6th at 2:00pm          ----- Message from Jeff Benítez MD sent at 3/1/2024  4:39 PM CST -----  Regarding: RE: pt advice  Contact: [phone]  Thanks.   She does have low pneumococcal titers. I sent her a portal message explaining results. Can you please call or message her to schedule a nurse visit for PREVNAR-20. She should get repeat pneumococcal titer labs 4-6 weeks after the vaccine. I have put in orders for both the vaccine and the labs.    Thanks,  Jeff Benítez MD  Allergy/Immunology      ----- Message -----  From: Rahul Leong MA  Sent: 3/1/2024   9:28 AM CST  To: Jeff Benítez MD  Subject: FW: pt advice                                    Good morning ,    Kindly find attached message for your review and further advice.    Please let me know if and how I can assit you with this.    Kind Regards,  Rahul Leong MA     ----- Message -----  From: Thu Barrett  Sent: 3/1/2024   8:40 AM CST  To: [facility] Staff  Subject: pt advice                                        Name Of Caller: self     Contact Preference?:  [phone]     What is the nature of the call?: pt is requesting a call back regarding her immune system is low do to the Rx Exemestane 25mg that she takes for cancer. She would like to know will she be receiving the shot for her immune system. She states that all her lab work was negative     Additional Notes:    "Thank you for all that you do for our patients"            "

## 2024-03-04 ENCOUNTER — TELEPHONE (OUTPATIENT)
Dept: INTERNAL MEDICINE | Facility: CLINIC | Age: 69
End: 2024-03-04
Payer: MEDICARE

## 2024-03-04 DIAGNOSIS — E87.1 HYPONATREMIA: Primary | ICD-10-CM

## 2024-03-04 NOTE — TELEPHONE ENCOUNTER
----- Message from Viktor Drake sent at 3/4/2024  3:28 PM CST -----  Contact: Pt [phone]  Consut    She wants to know if it's time for a colonoscopy. She is also urinating a lot for the last 3 weeks and want to know if you should check her sodium.    Thank you

## 2024-03-04 NOTE — TELEPHONE ENCOUNTER
Called patient advised when she is due for colonoscopy. Patient states she urinates more than often. No fever, no urgency, no smell. She states she goes more than she use too. Would like to know pcp wants to check sodium level.

## 2024-03-06 ENCOUNTER — CLINICAL SUPPORT (OUTPATIENT)
Dept: ALLERGY | Facility: CLINIC | Age: 69
End: 2024-03-06
Payer: MEDICARE

## 2024-03-06 VITALS — WEIGHT: 99.19 LBS | BODY MASS INDEX: 18.25 KG/M2 | HEIGHT: 62 IN

## 2024-03-06 DIAGNOSIS — Z86.19 FREQUENT INFECTIONS: Primary | ICD-10-CM

## 2024-03-06 PROCEDURE — 90677 PCV20 VACCINE IM: CPT | Mod: S$GLB,,, | Performed by: STUDENT IN AN ORGANIZED HEALTH CARE EDUCATION/TRAINING PROGRAM

## 2024-03-06 PROCEDURE — G0009 ADMIN PNEUMOCOCCAL VACCINE: HCPCS | Mod: S$GLB,,, | Performed by: STUDENT IN AN ORGANIZED HEALTH CARE EDUCATION/TRAINING PROGRAM

## 2024-03-06 PROCEDURE — 99999 PR PBB SHADOW E&M-EST. PATIENT-LVL IV: CPT | Mod: PBBFAC,,,

## 2024-03-06 RX ORDER — FLUTICASONE PROPIONATE 50 MCG
1 SPRAY, SUSPENSION (ML) NASAL DAILY
COMMUNITY
Start: 2024-03-04

## 2024-03-06 NOTE — PROGRESS NOTES
Educated patient of injection Prenar 20, verbalized understanding, answered all questions. After obtaining consent, and per orders of Dr. Benítez, injection of Prenar 20 given left gluteus araseli at patient request, patient tolerated well. Patient instructed to remain in clinic for 15 minutes afterwards, and to report any adverse reaction to me immediately. No adverse reaction noted, denies pain, no complaints voiced. Scheduled follow up labs. Discharged home, safety measures maintained.

## 2024-03-07 ENCOUNTER — OFFICE VISIT (OUTPATIENT)
Dept: PODIATRY | Facility: CLINIC | Age: 69
End: 2024-03-07
Payer: MEDICARE

## 2024-03-07 ENCOUNTER — TELEPHONE (OUTPATIENT)
Dept: PAIN MEDICINE | Facility: CLINIC | Age: 69
End: 2024-03-07
Payer: MEDICARE

## 2024-03-07 VITALS
WEIGHT: 99.19 LBS | DIASTOLIC BLOOD PRESSURE: 75 MMHG | SYSTOLIC BLOOD PRESSURE: 140 MMHG | BODY MASS INDEX: 18.25 KG/M2 | TEMPERATURE: 100 F | HEART RATE: 78 BPM | HEIGHT: 62 IN

## 2024-03-07 DIAGNOSIS — M79.674 PAIN OF TOES OF BOTH FEET: ICD-10-CM

## 2024-03-07 DIAGNOSIS — B35.1 TINEA UNGUIUM: Primary | ICD-10-CM

## 2024-03-07 DIAGNOSIS — F20.0 PARANOID SCHIZOPHRENIA: ICD-10-CM

## 2024-03-07 DIAGNOSIS — M79.675 PAIN OF TOES OF BOTH FEET: ICD-10-CM

## 2024-03-07 PROCEDURE — 99999 PR PBB SHADOW E&M-EST. PATIENT-LVL III: CPT | Mod: PBBFAC,,, | Performed by: STUDENT IN AN ORGANIZED HEALTH CARE EDUCATION/TRAINING PROGRAM

## 2024-03-07 PROCEDURE — 11720 DEBRIDE NAIL 1-5: CPT | Mod: S$GLB,,, | Performed by: STUDENT IN AN ORGANIZED HEALTH CARE EDUCATION/TRAINING PROGRAM

## 2024-03-07 PROCEDURE — 99499 UNLISTED E&M SERVICE: CPT | Mod: S$GLB,,, | Performed by: STUDENT IN AN ORGANIZED HEALTH CARE EDUCATION/TRAINING PROGRAM

## 2024-03-07 RX ORDER — RISPERIDONE 4 MG/1
4 TABLET ORAL NIGHTLY
Qty: 90 TABLET | Refills: 3 | Status: SHIPPED | OUTPATIENT
Start: 2024-03-07

## 2024-03-07 NOTE — PROCEDURES
"Routine Foot Care    Date/Time: 3/7/2024 3:30 PM    Performed by: Walt Zhang DPM  Authorized by: Walt Zhang DPM    Time out: Immediately prior to procedure a "time out" was called to verify the correct patient, procedure, equipment, support staff and site/side marked as required.    Consent Done?:  Yes (Verbal)  Hyperkeratotic Skin Lesions?: No      Nail Care Type:  Debride(Left 1st Toe, Left 2nd Toe, Right 1st Toe and Right 2nd Toe)  Patient tolerance:  Patient tolerated the procedure well with no immediate complications       Continue ciclopirox    RTC in 3 months for routine foot care    "

## 2024-03-07 NOTE — TELEPHONE ENCOUNTER
Called pt in reference to message, patient said she will call back to let us know when she is available to come in for clinic appt.     ----- Message from Barbra Russ sent at 3/7/2024 11:20 AM CST -----  Regarding: Call back  Contact: [phone]  Who Called: PT     Patient is calling to talk to nurse in regards to see if she is able to get another injection on her back. Please advice

## 2024-03-08 ENCOUNTER — TELEPHONE (OUTPATIENT)
Dept: INTERNAL MEDICINE | Facility: CLINIC | Age: 69
End: 2024-03-08
Payer: MEDICARE

## 2024-03-08 DIAGNOSIS — I10 ESSENTIAL HYPERTENSION: Primary | ICD-10-CM

## 2024-03-08 NOTE — TELEPHONE ENCOUNTER
Can you please reorder BMP lab. The original lab won't allow us to schedule due to the expected date on the order was 3/4/24.

## 2024-03-08 NOTE — TELEPHONE ENCOUNTER
----- Message from Carol Ann Brown sent at 3/8/2024  2:47 PM CST -----  Type:  Needs Medical Advice    Who Called: pt  Symptoms (please be specific): pt needs a call back after the blood work I put back in for another day she would like to go Monday 3/11   Would the patient rather a call back or a response via [facility]? call  Best Call Back Number: [phone]  Additional Information:

## 2024-03-11 ENCOUNTER — TELEPHONE (OUTPATIENT)
Dept: ALLERGY | Facility: CLINIC | Age: 69
End: 2024-03-11
Payer: MEDICARE

## 2024-03-11 ENCOUNTER — DOCUMENTATION ONLY (OUTPATIENT)
Dept: ALLERGY | Facility: CLINIC | Age: 69
End: 2024-03-11
Payer: MEDICARE

## 2024-03-11 ENCOUNTER — LAB VISIT (OUTPATIENT)
Dept: LAB | Facility: HOSPITAL | Age: 69
End: 2024-03-11
Attending: INTERNAL MEDICINE
Payer: MEDICARE

## 2024-03-11 DIAGNOSIS — E87.1 HYPONATREMIA: ICD-10-CM

## 2024-03-11 LAB
ANION GAP SERPL CALC-SCNC: 6 MMOL/L (ref 8–16)
BUN SERPL-MCNC: 5 MG/DL (ref 8–23)
CALCIUM SERPL-MCNC: 9.1 MG/DL (ref 8.7–10.5)
CHLORIDE SERPL-SCNC: 101 MMOL/L (ref 95–110)
CO2 SERPL-SCNC: 29 MMOL/L (ref 23–29)
CREAT SERPL-MCNC: 0.8 MG/DL (ref 0.5–1.4)
EST. GFR  (NO RACE VARIABLE): >60 ML/MIN/1.73 M^2
GLUCOSE SERPL-MCNC: 111 MG/DL (ref 70–110)
POTASSIUM SERPL-SCNC: 3.9 MMOL/L (ref 3.5–5.1)
SODIUM SERPL-SCNC: 136 MMOL/L (ref 136–145)

## 2024-03-11 PROCEDURE — 80048 BASIC METABOLIC PNL TOTAL CA: CPT | Performed by: INTERNAL MEDICINE

## 2024-03-11 PROCEDURE — 36415 COLL VENOUS BLD VENIPUNCTURE: CPT | Mod: PO | Performed by: INTERNAL MEDICINE

## 2024-03-11 NOTE — TELEPHONE ENCOUNTER
----- Message from Christy Elias sent at 3/11/2024  8:57 AM CDT -----  Regarding: appt  Contact: @ [phone]  Pt requesting a appointment for the following a 2 month follow no available dates was last seen 02/23..Please call and adv @ [phone]

## 2024-03-11 NOTE — PROGRESS NOTES
Telephone call made to the patient unable to speak with her voice message left with the clinic's number, for the patient to make contact to get assistance with scheduling an appointment.

## 2024-03-12 ENCOUNTER — TELEPHONE (OUTPATIENT)
Dept: ALLERGY | Facility: CLINIC | Age: 69
End: 2024-03-12
Payer: MEDICARE

## 2024-03-12 NOTE — TELEPHONE ENCOUNTER
----- Message from Jerzy Kovacs sent at 3/12/2024  8:48 AM CDT -----  Regarding: Appt  Contact: [phone]  Pt calling to speak with someone in provider office regards scheduling an follow up appt. States she would like to be scheduled in April any time after 2:30 is fine. No appt available. Please call pt back at  [phone]

## 2024-03-14 ENCOUNTER — TELEPHONE (OUTPATIENT)
Dept: ALLERGY | Facility: CLINIC | Age: 69
End: 2024-03-14
Payer: MEDICARE

## 2024-03-14 NOTE — TELEPHONE ENCOUNTER
Good afternoon ,    Kindly note patient wanted clarification on her future appointments and I gave her the details of her upcoming appointment,.    No other concerns voiced.    Kind Regards,  Rahul Leong MA      ---- Message from Brianna Navarro sent at 3/14/2024 12:05 PM CDT -----  Contact: [phone]  Bhavna Landry calling regarding Patient Advice (message) for # pt is calling to speak with nurse that appt for 4/26 is fine

## 2024-03-14 NOTE — TELEPHONE ENCOUNTER
----- Message from Brianna Navarro sent at 3/14/2024 12:05 PM CDT -----  Contact: [phone]  Bhavna Landry calling regarding Patient Advice (message) for # pt is calling to speak with nurse that appt for 4/26 is fine

## 2024-03-15 ENCOUNTER — TELEPHONE (OUTPATIENT)
Dept: RHEUMATOLOGY | Facility: CLINIC | Age: 69
End: 2024-03-15
Payer: MEDICARE

## 2024-03-15 NOTE — TELEPHONE ENCOUNTER
Called the patient and left the message below from Dr Cornelius    Please tell her the DXA is to measure bone density in the lumbar spine and hip. She has degenerative disc disease in the neck for which she sees Rosina [facility] in [facility]. Best treatment for cracking in the neck is to ignore it and proceed with physical therapy neck exercises. YEN

## 2024-03-15 NOTE — TELEPHONE ENCOUNTER
"----- Message from Mode Cornelius MD sent at 3/15/2024  8:08 AM CDT -----  Regarding: RE: pt advice  Contact: [phone]  Please tell her the DXA is to measure bone density in the lumbar spine and hip. She has degenerative disc disease in the neck for which she sees Rosina Moss in [facility]. Best treatment for cracking in the neck is to ignore it and proceed with physical therapy neck exercises. RJQ  ----- Message -----  From: Jessica Genao MA  Sent: 3/14/2024   4:58 PM CDT  To: Mode Cornelius MD  Subject: FW: pt advice                                      ----- Message -----  From: Thu Barrett  Sent: 3/14/2024   4:51 PM CDT  To: Ashli Martinez  Subject: pt advice                                        Name Of Caller: self     Contact Preference?:  [phone]     What is the nature of the call?: would like to know if the bone density test goes up to her skull due to it's cracking a lot     Additional Notes:    "Thank you for all that you do for our patients"            "

## 2024-03-18 ENCOUNTER — HOSPITAL ENCOUNTER (OUTPATIENT)
Dept: RADIOLOGY | Facility: HOSPITAL | Age: 69
Discharge: HOME | End: 2024-03-18
Attending: INTERNAL MEDICINE
Payer: MEDICARE

## 2024-03-18 ENCOUNTER — TELEPHONE (OUTPATIENT)
Dept: INTERNAL MEDICINE | Facility: CLINIC | Age: 69
End: 2024-03-18
Payer: MEDICARE

## 2024-03-18 DIAGNOSIS — M81.0 OSTEOPOROSIS, UNSPECIFIED OSTEOPOROSIS TYPE, UNSPECIFIED PATHOLOGICAL FRACTURE PRESENCE: ICD-10-CM

## 2024-03-18 DIAGNOSIS — Z78.0 MENOPAUSE: ICD-10-CM

## 2024-03-18 PROCEDURE — 77080 DXA BONE DENSITY AXIAL: CPT | Mod: 26,,, | Performed by: RADIOLOGY

## 2024-03-18 PROCEDURE — 77080 DXA BONE DENSITY AXIAL: CPT | Mod: TC

## 2024-03-18 RX ORDER — BACLOFEN 10 MG/1
TABLET ORAL
Qty: 60 TABLET | Refills: 1 | Status: SHIPPED | OUTPATIENT
Start: 2024-03-18

## 2024-03-18 NOTE — PROGRESS NOTES
Your DXA shows osteoporosis with very high fracture risk but stable compared with previous. Would continue Zometa(zoledronate) 4mg every 6 months from Dr. Marcelino.

## 2024-03-18 NOTE — TELEPHONE ENCOUNTER
----- Message from Julia Iselakosta Irby sent at 3/18/2024 11:25 AM CDT -----  Contact: [phone] pt  1MEDICALADVICE     Patient is calling for Medical Advice regarding:pt is calling to see about getting a muscle relax  she do not want baclofen..she do not want this please prescribe her one prefer a 5ml with the opt of taking 2.    How long has patient had these symptoms:    Pharmacy name and phone#:  [id number] - [place] [address] [facility] [address]  [address] [address]  Phone: [phone] Fax: [phone]      Would like response via [facility]:  call back

## 2024-03-18 NOTE — TELEPHONE ENCOUNTER
Returned pt call. Pt states that PCP prescribed Baclofen for her muscle cramps in October. Pt states she cannot take the 20mg dose because it makes her sleepy. Pt asking if there is a lower dose muscle relaxer that can be prescribed for pt cramps in hands, feet, and legs.

## 2024-03-21 ENCOUNTER — TELEPHONE (OUTPATIENT)
Dept: PULMONOLOGY | Facility: CLINIC | Age: 69
End: 2024-03-21
Payer: MEDICARE

## 2024-03-21 ENCOUNTER — PATIENT MESSAGE (OUTPATIENT)
Dept: INTERNAL MEDICINE | Facility: CLINIC | Age: 69
End: 2024-03-21
Payer: MEDICARE

## 2024-03-21 DIAGNOSIS — R05.9 COUGH, UNSPECIFIED TYPE: Primary | ICD-10-CM

## 2024-03-21 NOTE — TELEPHONE ENCOUNTER
----- Message from Leon Fields sent at 3/21/2024  2:24 PM CDT -----  Type:  Patient Returning Call    Who Called: pt  Who Left Message for Patient: Lolis John MA  Does the patient know what this is regarding?:   Would the patient rather a call back or a response via [facility]? call  Best Call Back Number: [phone]  Additional Information:

## 2024-03-21 NOTE — TELEPHONE ENCOUNTER
----- Message from Shawna Sibley sent at 3/21/2024  2:15 PM CDT -----  Regarding: appt req  Contact: Pt  Type: Appointment Request    Caller is requesting an appointment for a new patient.    Name of Caller: pt  Reason for appointment:   R05.2 (ICD-10-CM) - Subacute cough  Would the patient rather a call back or a response via [facility]? Call back  Best Call Back Number:  [phone]  Additional Information: please call, Thank You

## 2024-03-23 DIAGNOSIS — F41.9 ANXIETY: ICD-10-CM

## 2024-03-24 RX ORDER — GABAPENTIN 100 MG/1
CAPSULE ORAL
Qty: 180 CAPSULE | Refills: 3 | Status: SHIPPED | OUTPATIENT
Start: 2024-03-24 | End: 2024-05-08 | Stop reason: SDUPTHER

## 2024-03-25 ENCOUNTER — OFFICE VISIT (OUTPATIENT)
Dept: INTERNAL MEDICINE | Facility: CLINIC | Age: 69
End: 2024-03-25
Payer: MEDICARE

## 2024-03-25 VITALS
DIASTOLIC BLOOD PRESSURE: 59 MMHG | SYSTOLIC BLOOD PRESSURE: 129 MMHG | HEIGHT: 62 IN | BODY MASS INDEX: 18.25 KG/M2 | WEIGHT: 99.19 LBS | OXYGEN SATURATION: 97 % | HEART RATE: 70 BPM

## 2024-03-25 DIAGNOSIS — I10 ESSENTIAL HYPERTENSION: Chronic | ICD-10-CM

## 2024-03-25 DIAGNOSIS — C77.3 SECONDARY AND UNSPECIFIED MALIGNANT NEOPLASM OF AXILLA AND UPPER LIMB LYMPH NODES: ICD-10-CM

## 2024-03-25 DIAGNOSIS — F20.0 PARANOID SCHIZOPHRENIA: ICD-10-CM

## 2024-03-25 DIAGNOSIS — M81.0 OSTEOPOROSIS, POST-MENOPAUSAL: ICD-10-CM

## 2024-03-25 DIAGNOSIS — I48.0 PAF (PAROXYSMAL ATRIAL FIBRILLATION): ICD-10-CM

## 2024-03-25 DIAGNOSIS — M54.50 ACUTE LOW BACK PAIN WITHOUT SCIATICA, UNSPECIFIED BACK PAIN LATERALITY: Primary | ICD-10-CM

## 2024-03-25 DIAGNOSIS — I70.0 ATHEROSCLEROSIS OF AORTA: ICD-10-CM

## 2024-03-25 DIAGNOSIS — M53.3 SACRAL PAIN: ICD-10-CM

## 2024-03-25 PROCEDURE — 99999 PR PBB SHADOW E&M-EST. PATIENT-LVL IV: CPT | Mod: PBBFAC,,, | Performed by: INTERNAL MEDICINE

## 2024-03-25 PROCEDURE — 99214 OFFICE O/P EST MOD 30 MIN: CPT | Mod: S$GLB,,, | Performed by: INTERNAL MEDICINE

## 2024-03-25 RX ORDER — HYDROCODONE BITARTRATE AND ACETAMINOPHEN 5; 325 MG/1; MG/1
1 TABLET ORAL EVERY 8 HOURS PRN
Qty: 30 TABLET | Refills: 0 | Status: SHIPPED | OUTPATIENT
Start: 2024-03-25 | End: 2024-04-08 | Stop reason: SDUPTHER

## 2024-03-25 NOTE — PROGRESS NOTES
"Subjective     Patient ID: Bhavna Landry is a 68 y.o. female.    Chief Complaint: Low-back Pain    Low-back Pain  Pertinent negatives include no abdominal pain, chest pain, congestion, coughing, fever, headaches, nausea or rash.     C/o lower back pain, severe over last 2 months.  Worse over buttocks, bilat.  Doesn't radiate down legs.  She has tried muscle relaxers, which help minimally  She is only take gabapentin 100 mg once a day .  She quit tramadol, as not helpful.  Also with a lot of neck pain.  C/o a "cracking" when she rotates neck.  PT for back many months ago.  Doing home exercises.  Last saw Pain Management 5/23:    Br cancer, under treatment.    Lumbar MRI 3/2023:  Multilevel degenerative changes of the lumbar spine detailed above. Moderate to severe neural foraminal narrowing at L1-L2 and L3-S1.   SOPHY aug 2023- have short term relief only.  Medial BB lumbar 5/3/23  She also had nerve albation 10/19.  Last filled a narcotic, tramadol, in sept 2023    Recent Dexa showed stable osteoporosis.  Tx with Zometa.    She has h/o hyponatremia.  Last Na was normal.    Review of Systems   Constitutional:  Negative for fever and unexpected weight change.   HENT:  Negative for nasal congestion and postnasal drip.    Eyes:  Negative for pain, discharge and visual disturbance.   Respiratory:  Negative for cough, chest tightness, shortness of breath and wheezing.    Cardiovascular:  Negative for chest pain and leg swelling.   Gastrointestinal:  Negative for abdominal pain, constipation, diarrhea and nausea.   Genitourinary:  Negative for difficulty urinating, dysuria and hematuria.   Musculoskeletal:  Positive for back pain.   Integumentary:  Negative for rash.   Neurological:  Negative for headaches.   Psychiatric/Behavioral:  Negative for dysphoric mood and sleep disturbance. The patient is not nervous/anxious.           Objective     Physical Exam  Constitutional:       General: She is not in acute distress.     " Appearance: She is well-developed. She is not ill-appearing, toxic-appearing or diaphoretic.   Eyes:      General: No scleral icterus.  Neck:      Thyroid: No thyromegaly.      Vascular: No JVD.   Cardiovascular:      Rate and Rhythm: Normal rate and regular rhythm.      Heart sounds: Normal heart sounds. No murmur heard.  Pulmonary:      Effort: Pulmonary effort is normal. No respiratory distress.      Breath sounds: Normal breath sounds. No stridor. No wheezing, rhonchi or rales.   Abdominal:      General: There is no distension.      Palpations: Abdomen is soft. There is no mass.      Tenderness: There is no abdominal tenderness. There is no guarding.      Hernia: No hernia is present.   Musculoskeletal:      Cervical back: No rigidity or tenderness.      Right lower leg: No edema.      Left lower leg: No edema.      Comments: Pain is localized to mid buttocks bilat, R > L.   Lymphadenopathy:      Cervical: No cervical adenopathy.   Neurological:      Mental Status: She is alert and oriented to person, place, and time.   Psychiatric:         Mood and Affect: Mood normal.         Behavior: Behavior normal.         Thought Content: Thought content normal.     152/78       Assessment and Plan     1. Acute low back pain without sciatica, unspecified back pain laterality  -     X-Ray Lumbar Spine Ap And Lateral; Future; Expected date: 03/25/2024    2. Sacral pain  -     X-Ray Lumbar Spine Ap And Lateral; Future; Expected date: 03/25/2024    3. Essential hypertension    4. Osteoporosis, post-menopausal  Overview:  She refused Reclast      Other orders  -     HYDROcodone-acetaminophen (NORCO) 5-325 mg per tablet; Take 1 tablet by mouth every 8 (eight) hours as needed for Pain.  Dispense: 30 tablet; Refill: 0      5.  H/O hyponatremia    129/59 is average Feb reading     Start hydrocodone - use judiciously  She declines PT, Pain Management - she is tired of injections without long term benefit.  No follow-ups on  file.

## 2024-03-27 ENCOUNTER — HOSPITAL ENCOUNTER (OUTPATIENT)
Dept: RADIOLOGY | Facility: HOSPITAL | Age: 69
Discharge: HOME | End: 2024-03-27
Attending: INTERNAL MEDICINE
Payer: MEDICARE

## 2024-03-27 DIAGNOSIS — M54.50 ACUTE LOW BACK PAIN WITHOUT SCIATICA, UNSPECIFIED BACK PAIN LATERALITY: ICD-10-CM

## 2024-03-27 DIAGNOSIS — M53.3 SACRAL PAIN: ICD-10-CM

## 2024-03-27 PROCEDURE — 72100 X-RAY EXAM L-S SPINE 2/3 VWS: CPT | Mod: 26,,, | Performed by: STUDENT IN AN ORGANIZED HEALTH CARE EDUCATION/TRAINING PROGRAM

## 2024-03-27 PROCEDURE — 72100 X-RAY EXAM L-S SPINE 2/3 VWS: CPT | Mod: TC,FY,PO

## 2024-03-28 ENCOUNTER — TELEPHONE (OUTPATIENT)
Dept: INTERNAL MEDICINE | Facility: CLINIC | Age: 69
End: 2024-03-28
Payer: MEDICARE

## 2024-03-28 ENCOUNTER — PATIENT MESSAGE (OUTPATIENT)
Dept: INTERNAL MEDICINE | Facility: CLINIC | Age: 69
End: 2024-03-28
Payer: MEDICARE

## 2024-03-28 DIAGNOSIS — G89.29 CHRONIC LOW BACK PAIN WITHOUT SCIATICA, UNSPECIFIED BACK PAIN LATERALITY: Primary | ICD-10-CM

## 2024-03-28 DIAGNOSIS — M54.50 CHRONIC LOW BACK PAIN WITHOUT SCIATICA, UNSPECIFIED BACK PAIN LATERALITY: Primary | ICD-10-CM

## 2024-03-28 NOTE — TELEPHONE ENCOUNTER
Pain management referral    Discussed:  Try taking hydrocodone 1 tab 3- 4 times a day.    If not effective, take 2 at a time 2-3 times a day.     She may take 2 instead of 1 at bedtime if pain keeps her from sleeping.    She agrees to  pain management referral

## 2024-03-28 NOTE — TELEPHONE ENCOUNTER
"----- Message from Ro Gonzalez sent at 3/28/2024  2:40 PM CDT -----  Contact: [phone]  1MEDICALADVICE     Patient is calling for Medical Advice regarding: Patient would like someone to call her back to discuss her results of her x rays from 3/27/24. Patient states that she is still in pain and she is taking only one pill of the HYDROcodone-acetaminophen (NORCO) 5-325 mg per tablet per day because she says it only last for 3 hours. She also would like to know what does"learn more" means ?       Comments:Please call  today and advise.         "

## 2024-03-28 NOTE — TELEPHONE ENCOUNTER
Called patient went over her results advised of pcp note. She states what is the next step because she is still in pain. She states she is only taking 1 norco a day. I advised her of the directions for the pain medication says 1 every 8 hours. Patient states the bottle says 1 every 6 hours but it only last 3 hours. Patient states she is only taking 1 a day.

## 2024-04-02 ENCOUNTER — OFFICE VISIT (OUTPATIENT)
Dept: PAIN MEDICINE | Facility: CLINIC | Age: 69
End: 2024-04-02
Payer: MEDICARE

## 2024-04-02 ENCOUNTER — TELEPHONE (OUTPATIENT)
Dept: PAIN MEDICINE | Facility: CLINIC | Age: 69
End: 2024-04-02
Payer: MEDICARE

## 2024-04-02 VITALS
WEIGHT: 103.31 LBS | SYSTOLIC BLOOD PRESSURE: 166 MMHG | HEART RATE: 73 BPM | BODY MASS INDEX: 19.01 KG/M2 | HEIGHT: 62 IN | DIASTOLIC BLOOD PRESSURE: 65 MMHG

## 2024-04-02 DIAGNOSIS — M46.1 BILATERAL SACROILIITIS: Primary | ICD-10-CM

## 2024-04-02 DIAGNOSIS — M46.1 BILATERAL SACROILIITIS: ICD-10-CM

## 2024-04-02 DIAGNOSIS — G89.29 CHRONIC LOW BACK PAIN WITHOUT SCIATICA, UNSPECIFIED BACK PAIN LATERALITY: Primary | ICD-10-CM

## 2024-04-02 DIAGNOSIS — M53.3 SI (SACROILIAC) JOINT DYSFUNCTION: ICD-10-CM

## 2024-04-02 DIAGNOSIS — M54.50 CHRONIC LOW BACK PAIN WITHOUT SCIATICA, UNSPECIFIED BACK PAIN LATERALITY: ICD-10-CM

## 2024-04-02 DIAGNOSIS — M54.50 CHRONIC LOW BACK PAIN WITHOUT SCIATICA, UNSPECIFIED BACK PAIN LATERALITY: Primary | ICD-10-CM

## 2024-04-02 DIAGNOSIS — M54.50 CHRONIC BILATERAL LOW BACK PAIN WITHOUT SCIATICA: ICD-10-CM

## 2024-04-02 DIAGNOSIS — G89.29 CHRONIC LOW BACK PAIN WITHOUT SCIATICA, UNSPECIFIED BACK PAIN LATERALITY: ICD-10-CM

## 2024-04-02 DIAGNOSIS — G89.29 CHRONIC BILATERAL LOW BACK PAIN WITHOUT SCIATICA: ICD-10-CM

## 2024-04-02 PROCEDURE — 99999 PR PBB SHADOW E&M-EST. PATIENT-LVL IV: CPT | Mod: PBBFAC,,, | Performed by: STUDENT IN AN ORGANIZED HEALTH CARE EDUCATION/TRAINING PROGRAM

## 2024-04-02 PROCEDURE — 1101F PT FALLS ASSESS-DOCD LE1/YR: CPT | Mod: CPTII,S$GLB,, | Performed by: STUDENT IN AN ORGANIZED HEALTH CARE EDUCATION/TRAINING PROGRAM

## 2024-04-02 PROCEDURE — 3077F SYST BP >= 140 MM HG: CPT | Mod: CPTII,S$GLB,, | Performed by: STUDENT IN AN ORGANIZED HEALTH CARE EDUCATION/TRAINING PROGRAM

## 2024-04-02 PROCEDURE — 3288F FALL RISK ASSESSMENT DOCD: CPT | Mod: CPTII,S$GLB,, | Performed by: STUDENT IN AN ORGANIZED HEALTH CARE EDUCATION/TRAINING PROGRAM

## 2024-04-02 PROCEDURE — 1159F MED LIST DOCD IN RCRD: CPT | Mod: CPTII,S$GLB,, | Performed by: STUDENT IN AN ORGANIZED HEALTH CARE EDUCATION/TRAINING PROGRAM

## 2024-04-02 PROCEDURE — 3078F DIAST BP <80 MM HG: CPT | Mod: CPTII,S$GLB,, | Performed by: STUDENT IN AN ORGANIZED HEALTH CARE EDUCATION/TRAINING PROGRAM

## 2024-04-02 PROCEDURE — 99214 OFFICE O/P EST MOD 30 MIN: CPT | Mod: S$GLB,,, | Performed by: STUDENT IN AN ORGANIZED HEALTH CARE EDUCATION/TRAINING PROGRAM

## 2024-04-02 PROCEDURE — 1125F AMNT PAIN NOTED PAIN PRSNT: CPT | Mod: CPTII,S$GLB,, | Performed by: STUDENT IN AN ORGANIZED HEALTH CARE EDUCATION/TRAINING PROGRAM

## 2024-04-02 PROCEDURE — 3008F BODY MASS INDEX DOCD: CPT | Mod: CPTII,S$GLB,, | Performed by: STUDENT IN AN ORGANIZED HEALTH CARE EDUCATION/TRAINING PROGRAM

## 2024-04-02 NOTE — H&P (VIEW-ONLY)
" [facility] Established Patient Evaluation    Chief Complaint  Chief Complaint   Patient presents with    Back Pain    Leg Pain           4/2/2024     1:05 PM 5/22/2023     1:55 PM 3/14/2023     2:10 PM   Last 3 PDI Scores   Pain Disability Index (PDI) 36 11 22     Interval History 04/02/2024:  Bhavna Landry presents today for follow up of her low back pain.  Her low back/sacral pain has returned.  In the past she has responded well to bilateral L5 Dorsal Ramus and Lateral Branches of S1, S2, and S3 RFA.  This is previously given her significant relief lasting several years.  She would like to repeat this procedure.  Denies any recent falls or trauma.  No changes to her pain.  Denies any radicular symptoms.  No weakness, saddle anesthesia, bowel or bladder changes.     Interval History (05/22/2023):  Bhavna Landry returns today for follow up she is s/p a a diagnostic Lumbar MBB targeting L4/5 and L5/S1 that  provided 0% relief of her low back and leg pain R>L. She would like to consider other injection that may help. She denies and new pain, denies B/B dysfunction, denies any profound weakness.     Current Pain Scales:  Current: 5/10              Typical Range: 5-9/10     Interval History (03/14/2023):  Bhavna Landry returns today for low back pain that radiates into the b/l thighs.  Pain is described as clenching pain that has worsened over last 2 years. No trauma or surgery.  Pain is worse with extension.  Pain is better with heat, lying down, gabapentin, tylenol and tramadol.  She is scheduled to start PT next week.  Currently, the bilateral hip and bilateral leg pain is stable.  Avoid NSAIDs due to history of gastric ulcer.     Current Pain Scales:  Current: 7/10              Typical Range: 7-8/10     Background from Chart Review  9/16/2020- Mrs. Landry presents to clinic today reporting left hip pain for the past 3-4 mos, which has not improved with PT.  Pain starts in the lateral left hip "on " "the bone" and radiates through the buttock, down the lateral thigh not passing the knee, and into the anterolateral thigh.  She endorses regular stretching and states that this pain is distinctly different from the SI pain for which she received SI RFA in the past.     6/09/2020-   64-year-old female presents status post left then right  DR 5+ lateral branches of the S1, S2 and S3 RFA with reported 100% continued  relief she reports that her pain score today is 0/10.  She reports improvement with her ADLs and overall improvement of her functionality.     05/05/2020  presents tele-medicine appointment for a follow-up appointment for low back, left groin and bilateral hip pain.  Since the last visit, Bhavna MEJÍA Amberlylauri states the pain has been persistant. Current pain intensity is 9/10.  Patient reports onset of pain 2 weeks, patient states bending forward and sitting for long periods of time increase her pain.  Pain is described as aching.  She reports no radicular symptoms in either leg.  Worst pain is not out of 10.  Patient is status post right and left L5 Dorsal Ramus and Lateral Branches of S1, S2, and S3 (4 levels) RFA reported 80-90% relief for a minimal 6 months.  Pain is now returned and patient like to reschedule procedure.        Treatment History  PT/OT: yes  HEP: yes  TENS:    Injections:   Bilateral SI joint injections, bilateral  Dorsal ramus block(DR5, S1,S2,S3),  bilateral L5 Dorsal Ramus and Lateral Branches of S1, S2, and S3 (4 levels) RFA, GTB injections   12/08/2023 - Cervical Interlaminar Epidural Steroid Injection C6/C7 under Fluoroscopic Guidance   08//02/2023 -  Lumbar Interlaminar Epidural Steroid Injection L5/S1   05/03/2023 -  Diagnostic Bilateral L3, L4, and L5 Lumbar Medial Branch Block under Fluoroscopy  - No relief    Surgery:  Medications:   - NSAIDS: avoid due to gastric ulcer   - MSK Relaxants:   - TCAs:   - SNRIs: Venlafaxine  - Topicals: Voltaren  - Opioids: Tramadol   - " Anticonvulsants: Gabapentin    Current Pain Medications  Gabapentin  Tramadol      Full Medication List    Current Outpatient Medications:     albuterol (PROVENTIL) 2.5 mg /3 mL (0.083 %) nebulizer solution, Take 3 mLs (2.5 mg total) by nebulization every 6 (six) hours as needed for Wheezing. Rescue, Disp: 90 each, Rfl: 2    albuterol (PROVENTIL/VENTOLIN HFA) 90 mcg/actuation inhaler, USE 2 INHALATIONS BY MOUTH  4 TIMES DAILY AS NEEDED, Disp: 34 g, Rfl: 3    amLODIPine (NORVASC) 5 MG tablet, Take 1 tablet (5 mg total) by mouth 2 (two) times a day., Disp: 180 tablet, Rfl: 1    ascorbic acid, vitamin C, (VITAMIN C) 500 MG tablet, Take 500 mg by mouth once daily., Disp: , Rfl:     aspirin (ECOTRIN) 81 MG EC tablet, Take 81 mg by mouth once daily., Disp: , Rfl:     atorvastatin (LIPITOR) 80 MG tablet, Take 1 tablet (80 mg total) by mouth once daily., Disp: 90 tablet, Rfl: 0    baclofen (LIORESAL) 10 MG tablet, 1 tab po tid for muscle cramps., Disp: 60 tablet, Rfl: 1    benztropine (COGENTIN) 0.5 MG tablet, TAKE 1 TABLET(0.5 MG) BY MOUTH TWICE DAILY, Disp: 180 tablet, Rfl: 3    budesonide-formoterol 80-4.5 mcg (SYMBICORT) 80-4.5 mcg/actuation HFAA, Inhale 2 puffs into the lungs 2 (two) times a day., Disp: 6.9 g, Rfl: 11    ciclopirox (PENLAC) 8 % Soln, Apply topically nightly. Paint on affected nail(s) once per night, remove residue once per week with alcohol, Disp: 6.6 mL, Rfl: 3    cloNIDine (CATAPRES) 0.1 MG tablet, 1 tab po q day for systolic BP > 160 or DBP >100., Disp: 30 tablet, Rfl: 1    exemestane (AROMASIN) 25 mg tablet, Take 1 tablet (25 mg total) by mouth once daily., Disp: 30 tablet, Rfl: 11    fluticasone (VERAMYST) 27.5 mcg/actuation nasal spray, 2 sprays by Nasal route as needed for Rhinitis., Disp: , Rfl:     fluticasone propionate (FLONASE) 50 mcg/actuation nasal spray, 1 spray by Each Nostril route once daily., Disp: , Rfl:     folic acid (FOLVITE) 1 MG tablet, Take 1 tablet (1,000 mcg total) by mouth  once daily., Disp: 90 tablet, Rfl: 3    gabapentin (NEURONTIN) 100 MG capsule, TAKE 1 CAPSULE(100 MG) BY MOUTH TWICE DAILY AS NEEDED FOR ANXIETY, Disp: 180 capsule, Rfl: 3    HYDROcodone-acetaminophen (NORCO) 5-325 mg per tablet, Take 1 tablet by mouth every 8 (eight) hours as needed for Pain., Disp: 30 tablet, Rfl: 0    isosorbide mononitrate (IMDUR) 30 MG 24 hr tablet, TAKE 1 TABLET(30 MG) BY MOUTH EVERY DAY, Disp: 30 tablet, Rfl: 11    methotrexate 2.5 MG Tab, Take 8 tablets (20 mg total) by mouth every 7 days. Take 4 tabs Mon am and 4 tabs Mon pm only, Disp: 96 tablet, Rfl: 0    omega-3 fatty acids/fish oil (FISH OIL-OMEGA-3 FATTY ACIDS) 300-1,000 mg capsule, Take by mouth once daily., Disp: , Rfl:     omeprazole (PRILOSEC) 40 MG capsule, Take 1 capsule (40 mg total) by mouth every morning., Disp: 90 capsule, Rfl: 3    risperiDONE (RISPERDAL) 2 MG tablet, TAKE 1 TABLET(2 MG) BY MOUTH EVERY MORNING, Disp: 90 tablet, Rfl: 3    risperiDONE (RISPERDAL) 4 MG tablet, TAKE 1 TABLET(4 MG) BY MOUTH EVERY EVENING, Disp: 90 tablet, Rfl: 3    venlafaxine (EFFEXOR-XR) 75 MG 24 hr capsule, Take 1 capsule (75 mg total) by mouth once daily., Disp: 90 capsule, Rfl: 3    vitamin D (VITAMIN D3) 1000 units Tab, Take 1,000 Units by mouth once daily., Disp: , Rfl:     vitamin E 200 UNIT capsule, Take 200 Units by mouth once daily., Disp: , Rfl:     zinc gluconate 50 mg tablet, Take 50 mg by mouth once daily., Disp: , Rfl:   No current facility-administered medications for this visit.    Facility-Administered Medications Ordered in Other Visits:     tropicamide 1% ophthalmic solution 1 drop, 1 drop, Right Eye, On Call Procedure, Karen Song MD, 1 drop at 08/01/19 0648     Review of Systems  ROS  REVIEW OF SYSTEMS:    GENERAL:  No weight loss, malaise or fevers.  HEENT:   No recent changes in vision or hearing  NECK:  No difficulty with swallowing. No stridor.   RESPIRATORY:  Negative for cough, wheezing or shortness of breath,  patient denies any recent URI.  CARDIOVASCULAR:  Negative for chest pain, leg swelling or palpitations. +HTN  GI:  Negative for abdominal discomfort, blood in stools or black stools or change in bowel habits.  MUSCULOSKELETAL:  See HPI.  SKIN:  Negative for lesions, rash, and itching.  PSYCH:  No mood disorder or recent psychosocial stressors.   +anxiety +depression   HEMATOLOGY/LYMPHOLOGY:  Negative for prolonged bleeding, bruising easily or swollen nodes.  Patient is not currently taking any anti-coagulants  NEURO:   No history of headaches, syncope, paralysis, seizures or tremors.  All other reviewed and negative other than HPI.      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, estrogen receptor positive 2022    Cataract     Depression     Dry eyes     Dry mouth     Duane's syndrome of right eye     Early dry stage nonexudative age-related macular degeneration of both eyes 2022    Fibromyalgia 2014    Fibromyalgia     Fractured hip     RIGHT HIP    GERD (gastroesophageal reflux disease)     History of psychiatric hospitalization     Hyperlipidemia 1992    Hypertension     Hypocalcemia     Hyponatremia     Kidney stone     Migraine headache     Osteoporosis     Pressure ulcer of unspecified site, unspecified stage     Psoriatic arthritis 1992    Recurrent upper respiratory infection (URI)     Right knee pain     post knee replacement surgery (possible rejectiion of metal)    RLS (restless legs syndrome)     Schizophrenia 1992    stable on meds    Sciatica     Squamous cell carcinoma of skin     Urinary tract infection         Surgical History  Past Surgical History:   Procedure Laterality Date    brow ptosis repair Right 2019    Surgeon: Dr. Karla Henson    CATARACT EXTRACTION       SECTION       COLONOSCOPY N/A 05/30/2016    Procedure: COLONOSCOPY;  Surgeon: ANDRIA Connelly MD;  [facility]  Service: Endoscopy;  Laterality: N/A;    COLONOSCOPY N/A 12/09/2022    Procedure: COLONOSCOPY;  Surgeon: Mikey Walters MD;  Location: [facility] ENDO;  Service: Endoscopy;  Laterality: N/A;    cyst removed from right sinus  07/09/1982    EPIDURAL STEROID INJECTION INTO CERVICAL SPINE N/A 12/08/2023    Procedure: C6-7 SOPHY;  Surgeon: Spring Jensen MD;  Location: [facility];  Service: Pain Management;  Laterality: N/A;  20 mins    EPIDURAL STEROID INJECTION INTO LUMBAR SPINE N/A 08/02/2023    Procedure: Injection-steroid-epidural-lumbar L5-S1;  Surgeon: Chirag Kim MD;  Location: [facility];  Service: Pain Management;  Laterality: N/A;  asa    ESOPHAGOGASTRODUODENOSCOPY N/A 02/07/2023    Procedure: EGD (ESOPHAGOGASTRODUODENOSCOPY);  Surgeon: Dougie Shea MD;  Location: [facility];  Service: Endoscopy;  Laterality: N/A;    HYSTERECTOMY      VAGINAL HYSTERECTOMY WITHOUT BSO - ENDOMETRIOSIS    INJECTION FOR SENTINEL NODE IDENTIFICATION Right 05/09/2022    Procedure: INJECTION, FOR SENTINEL NODE IDENTIFICATION-Right;  Surgeon: NEAL Dhillon MD;  Location: [facility];  Service: General;  Laterality: Right;    INJECTION OF ANESTHETIC AGENT AROUND MEDIAL BRANCH NERVES INNERVATING LUMBAR FACET JOINT N/A 04/11/2019    Procedure: Lumbo-sacral Block, DR5 and Lateral Branches of S1,S2, S3;  Surgeon: Michelle Pineda Jr., MD;  Location: [facility]GT;  Service: Pain Management;  Laterality: N/A;  Pt takes  and states she holds ASA on her own whenever she has procedures.  Instructed to hold x 3 days prior to procedure.      INJECTION OF ANESTHETIC AGENT AROUND MEDIAL BRANCH NERVES INNERVATING LUMBAR FACET JOINT Bilateral 05/03/2023    Procedure: Block-nerve-medial branch-lumbar bilateral L3, L4, L5;  Surgeon: Maile Storm DO;  Location: [facility]IN [facility]GT;  Service: Pain Management;   Laterality: Bilateral;  asa    INJECTION OF ANESTHETIC AGENT INTO SACROILIAC JOINT Right 08/30/2018    Procedure: BLOCK, SACROILIAC JOINT-Right- ORAL SEDATION;  Surgeon: Michelle Pineda Jr., MD;  Location: [facility] PAIN MG;  Service: Pain Management;  Laterality: Right;    INJECTION OF ANESTHETIC AGENT INTO SACROILIAC JOINT Bilateral 09/27/2018    Procedure: BLOCK, SACROILIAC JOINT-BILATERAL;  Surgeon: Michelle Pineda Jr., MD;  Location: [facility]IN [facility]GT;  Service: Pain Management;  Laterality: Bilateral;  Please keep at 10:00 due to trasnsportation    INJECTION OF ANESTHETIC AGENT INTO SACROILIAC JOINT Bilateral 02/07/2019    Procedure: Bilateral Sacroiliac Joint Injection - Per Dr Pineda, not necessary to hold ASA.;  Surgeon: Michelle Pineda Jr., MD;  Location: [facility]IN [facility]GT;  Service: Pain Management;  Laterality: Bilateral;    INTRAOCULAR PROSTHESES INSERTION Right 08/01/2019    Procedure: INSERTION, IOL PROSTHESIS;  Surgeon: Karen Song MD;  Location: [address];  Service: Ophthalmology;  Laterality: Right;    INTRAOCULAR PROSTHESES INSERTION Left 09/26/2019    Procedure: INSERTION, IOL PROSTHESIS;  Surgeon: Karen Song MD;  Location: [address];  Service: Ophthalmology;  Laterality: Left;    JOINT REPLACEMENT Right     knee    KNEE SURGERY      MASTECTOMY Right 05/09/2022    Procedure: MASTECTOMY-Right;  Surgeon: NEAL Dhillon MD;  Location: [facility];  Service: General;  Laterality: Right;  2.5 HOURS    PHACOEMULSIFICATION OF CATARACT Right 08/01/2019    Procedure: PHACOEMULSIFICATION, CATARACT;  Surgeon: Karen Song MD;  Location: [address];  Service: Ophthalmology;  Laterality: Right;    PHACOEMULSIFICATION OF CATARACT Left 09/26/2019    Procedure: PHACOEMULSIFICATION, CATARACT;  Surgeon: Karen Song MD;  Location: [facility] OR [address] [place] [address];  Service: Ophthalmology;  Laterality: Left;    RADIOFREQUENCY THERMOCOAGULATION Right 05/07/2019    Procedure: RADIOFREQUENCY THERMAL  "COAGULATION RIGHT DORSAL RAMUS 5 AND LATERAL BRANCH OF S1, S2 AND S3;  Surgeon: Michelle Pineda Jr., MD;  Location: [facility];  Service: Pain Management;  Laterality: Right;    RADIOFREQUENCY THERMOCOAGULATION Left 05/14/2019    Procedure: RADIOFREQUENCY THERMAL COAGULATION LEFT DORSAL RAMUS 5 AND LATERAL BRANCH OF S1,S2 AND S3;  Surgeon: Michelle Pineda Jr., MD;  Location: [facility];  Service: Pain Management;  Laterality: Left;    RADIOFREQUENCY THERMOCOAGULATION Right 10/22/2019    Procedure: RADIOFREQUENCY THERMAL COAGULATION---DARSAL RAMUS 5 and LATERAL S1,S2,and S3 Right;  Surgeon: Michelle Pineda Jr., MD;  Location: [facility];  Service: Pain Management;  Laterality: Right;  patient to sign consent DOS    RADIOFREQUENCY THERMOCOAGULATION Left 10/29/2019    Procedure: RADIOFREQUENCY THERMAL COAGULATION - LEFT - DR5, S1,S2, AND S3;  Surgeon: Michelle Pineda Jr., MD;  Location: [facility];  Service: Pain Management;  Laterality: Left;    RADIOFREQUENCY THERMOCOAGULATION Left 05/26/2020    Procedure: RADIOFREQUENCY THERMAL COAGULATION--Left DR5+ lateral branches of S1, S2, S3;  Surgeon: Michelle Pineda Jr., MD;  Location: [facility];  Service: Pain Management;  Laterality: Left;    RADIOFREQUENCY THERMOCOAGULATION Right 06/02/2020    Procedure: RADIOFREQUENCY THERMAL COAGULATION--Right DR5+ lateral branches of S1, S2, S3;  Surgeon: Michelle Pineda Jr., MD;  Location: [facility];  Service: Pain Management;  Laterality: Right;    SENTINEL LYMPH NODE BIOPSY Right 05/09/2022    Procedure: BIOPSY, LYMPH NODE, SENTINEL-Right;  Surgeon: NEAL Dhillon MD;  Location: [facility];  Service: General;  Laterality: Right;    SINUS SURGERY      Surgery on right knee  07/09/1982    TONSILLECTOMY      tumor removed from back left side upper shoulder  03/17/2006        Physical Exam  Vitals:    04/02/24 1304   BP: (!) 166/65   Pulse: 73   Weight: 46.9 kg (103 lb 4.6 oz)   Height: 5' 2" (1.575 m) "   PainSc:   9   PainLoc: Back       Ortho/SPM Exam  PHYSICAL EXAMINATION:    GENERAL: Well appearing, in no acute distress, alert and oriented x3.  PSYCH:  Mood and affect appropriate.  SKIN: Skin color, texture, turgor normal, no rashes or lesions.  HEAD/FACE:  Normocephalic, atraumatic. Cranial nerves grossly intact.  NECK: Normal ROM. Supple.   CV: RRR with palpation of the radial artery.  PULM: No evidence of respiratory difficulty, symmetric chest rise.  GI:  Soft and non-distended.  MSK: Straight leg raising is  negative to radicular pain. There is pain to palpation over the facet joints of the lumbar spine. There is pain with lumbar facet loading. There is pain over the SI joints. +HELIO. +Ga[place]len. +Sacral Thurst. Normal range of motion without pain reproduction with lumbar flexion. Pain with extension.  Peripheral joint ROM is full and pain free without obvious instability or laxity in all four extremities. No deformities, edema, or skin discoloration.  No atrophy or tone abnormalities are noted.   NEURO: Bilateral upper and lower extremity coordination and strength is symmetric.  No loss of sensation is noted.  MENTAL STATUS: A x O x 3, good concentration, speech is fluent and goal directed  MOTOR: 5/5 in all muscle groups  GAIT: normal.  Ambulates unassisted.    Imaging  MRI LUMBAR SPINE WITHOUT CONTRAST     CLINICAL HISTORY:  low back pain; Sacrococcygeal disorders, not elsewhere classified     TECHNIQUE:  Multiplanar, multisequence MR images were acquired from the thoracolumbar junction to the sacrum without contrast.     COMPARISON:  03/06/2023     FINDINGS:  Alignment: Normal.     Vertebrae: Degenerative endplate changes throughout the lumbar spine.     Discs: Moderate to severe disc height loss throughout the lumbar spine.  No evidence for discitis.     Cord: Conus terminates at L2 and appears unremarkable.  Probable thin fatty filum terminale noted.     Degenerative findings:     T12-L1: Small  central protrusion.  No spinal canal stenosis or neural foraminal narrowing.     L1-L2: Circumferential disc bulge with right paracentral/foraminal protrusion result in mild effacement of the thecal sac and moderate right neural foraminal narrowing.     L2-L3: Circumferential disc bulge.  No spinal canal stenosis or neural foraminal narrowing.     L3-L4: Circumferential disc bulge with left paracentral disc extrusion and moderate facet arthropathy result in effacement of the left lateral recess and severe left neural foraminal narrowing.     L4-L5: Circumferential disc bulge and mild facet arthropathy result in mild effacement of the thecal sac and moderate left, mild right neural foraminal narrowing.     L5-S1: Circumferential disc bulge and moderate facet arthropathy result in mild effacement of the thecal sac and severe right, moderate left neural foraminal narrowing.     Paraspinal muscles & soft tissues: Mild paraspinal muscle atrophy.  Questionable gallbladder wall thickening and small stones.     Impression:     1. Multilevel degenerative changes of the lumbar spine detailed above.  Moderate to severe neural foraminal narrowing at L1-L2 and L3-S1.  2. Questionable gallbladder wall thickening and small stones.  Recommend further evaluation with abdominal ultrasound.        Electronically signed by: Akash Black MD  Date:                                            03/12/2023      Labs:  BMP  Lab Results   Component Value Date     03/11/2024    K 3.9 03/11/2024     03/11/2024    CO2 29 03/11/2024    BUN 5 (L) 03/11/2024    CREATININE 0.8 03/11/2024    CALCIUM 9.1 03/11/2024    ANIONGAP 6 (L) 03/11/2024    EGFRNORACEVR >60.0 03/11/2024     Lab Results   Component Value Date    ALT 16 02/07/2024    AST 22 02/07/2024    GGT 91 (H) 07/28/2014    ALKPHOS 69 02/07/2024    BILITOT 0.4 02/07/2024       Assessment:  Problem List Items Addressed This Visit          Orthopedic    Low back pain    Relevant  Orders    Procedure Order to Pain Management    Bilateral sacroiliitis - Primary    Relevant Orders    Procedure Order to Pain Management    Procedure Order to Pain Management     Other Visit Diagnoses       SI (sacroiliac) joint dysfunction                  03/14/2023 -Bhavna Landry is a 68 y.o. 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, estrogen receptor positive (04/13/2022), Cataract, Depression, Dry eyes, Dry mouth, Duane's syndrome of right eye, Early dry stage nonexudative age-related macular degeneration of both eyes (09/20/2022), Fibromyalgia (04/17/2014), Fibromyalgia, Fractured hip, GERD (gastroesophageal reflux disease), History of psychiatric hospitalization, Hyperlipidemia (02/02/1992), Hypertension, Hypocalcemia, Hyponatremia, Kidney stone, Migraine headache, Osteoporosis, Pressure ulcer of unspecified site, unspecified stage, Psoriatic arthritis (02/02/1992), Recurrent upper respiratory infection (URI), Right knee pain, RLS (restless legs syndrome), Schizophrenia (02/02/1992), Sciatica, Squamous cell carcinoma of skin, and Urinary tract infection.  By history and examination this patient has chronic low back pain without radiculopathy.  The underlying cause cause is facet arthritis and deconditioning.  Pathology is confirmed by imaging.  We discussed the underlying diagnoses and multiple treatment options including non-opioid medications, interventional procedures, physical therapy, and home exercise.  The risks and benefits of each treatment option were discussed and all questions were answered.      5/22/2023- 68 y/o female with a Hx of chronic low back  and bilateral leg pain she is s/p a diagnosis lumbar MBB targeting L4/5 and L5/S1 reporting 0% relief of her symptoms. Her Lumbar MRI multilevel disc bulges starting   at L3 through S1.  She has severe right,  moderate left neural foraminal narrowing at L5-S1 thta may be causing her Low back and leg pain.  Her pain was refractory to diagnositic low lumbar MBB so i will attempt a L4-5 Lumbar SOPHY.     04/02/2024 Bhavna Landry presents for follow up of her low back/SI joint pain.  Patient has previously undergone bilateral L5 Dorsal Ramus and Lateral Branches of S1, S2, and S3 RFA with significant and lasting relief.  Prior RFA lasted about 2 years.  She would like to repeat the procedure as the pain has now returned.      Plan & Recommendations  Procedures:  Scheduled for repeat bilateral L5 Dorsal Ramus and Lateral Branches of S1, S2, and S3 RFA  Medications: Continue medications as previously prescribed.   Imaging: reviewed and discussed in detail  PT/OT/HEP: I encouraged the patient to maintain a home exercise regimen that includes daily, moderate cardiovascular exercise lasting at least 30 minutes.  This may include yoga, gela chi, walking, swimming, aqua aerobics, or other exercises that maintain a heart rate of 50-70% of the calculated maximum heart rate.  I also encouraged light, daily stretching focused on the target area.  Follow Up: RTC 4 weeks post procedure    Maile Storm DO   Interventional Pain Management        Disclaimer: This note was partly generated using dictation software which may occasionally result in transcription errors.

## 2024-04-02 NOTE — PROGRESS NOTES
" [facility] Established Patient Evaluation    Chief Complaint  Chief Complaint   Patient presents with    Back Pain    Leg Pain           4/2/2024     1:05 PM 5/22/2023     1:55 PM 3/14/2023     2:10 PM   Last 3 PDI Scores   Pain Disability Index (PDI) 36 11 22     Interval History 04/02/2024:  Bhavna Landry presents today for follow up of her low back pain.  Her low back/sacral pain has returned.  In the past she has responded well to bilateral L5 Dorsal Ramus and Lateral Branches of S1, S2, and S3 RFA.  This is previously given her significant relief lasting several years.  She would like to repeat this procedure.  Denies any recent falls or trauma.  No changes to her pain.  Denies any radicular symptoms.  No weakness, saddle anesthesia, bowel or bladder changes.     Interval History (05/22/2023):  Bhavna Landry returns today for follow up she is s/p a a diagnostic Lumbar MBB targeting L4/5 and L5/S1 that  provided 0% relief of her low back and leg pain R>L. She would like to consider other injection that may help. She denies and new pain, denies B/B dysfunction, denies any profound weakness.     Current Pain Scales:  Current: 5/10              Typical Range: 5-9/10     Interval History (03/14/2023):  Bhavna Landry returns today for low back pain that radiates into the b/l thighs.  Pain is described as clenching pain that has worsened over last 2 years. No trauma or surgery.  Pain is worse with extension.  Pain is better with heat, lying down, gabapentin, tylenol and tramadol.  She is scheduled to start PT next week.  Currently, the bilateral hip and bilateral leg pain is stable.  Avoid NSAIDs due to history of gastric ulcer.     Current Pain Scales:  Current: 7/10              Typical Range: 7-8/10     Background from Chart Review  9/16/2020- Mrs. Landry presents to clinic today reporting left hip pain for the past 3-4 mos, which has not improved with PT.  Pain starts in the lateral left hip "on " "the bone" and radiates through the buttock, down the lateral thigh not passing the knee, and into the anterolateral thigh.  She endorses regular stretching and states that this pain is distinctly different from the SI pain for which she received SI RFA in the past.     6/09/2020-   64-year-old female presents status post left then right  DR 5+ lateral branches of the S1, S2 and S3 RFA with reported 100% continued  relief she reports that her pain score today is 0/10.  She reports improvement with her ADLs and overall improvement of her functionality.     05/05/2020  presents tele-medicine appointment for a follow-up appointment for low back, left groin and bilateral hip pain.  Since the last visit, Bhavna MEJÍA Amberlylauri states the pain has been persistant. Current pain intensity is 9/10.  Patient reports onset of pain 2 weeks, patient states bending forward and sitting for long periods of time increase her pain.  Pain is described as aching.  She reports no radicular symptoms in either leg.  Worst pain is not out of 10.  Patient is status post right and left L5 Dorsal Ramus and Lateral Branches of S1, S2, and S3 (4 levels) RFA reported 80-90% relief for a minimal 6 months.  Pain is now returned and patient like to reschedule procedure.        Treatment History  PT/OT: yes  HEP: yes  TENS:    Injections:   Bilateral SI joint injections, bilateral  Dorsal ramus block(DR5, S1,S2,S3),  bilateral L5 Dorsal Ramus and Lateral Branches of S1, S2, and S3 (4 levels) RFA, GTB injections   12/08/2023 - Cervical Interlaminar Epidural Steroid Injection C6/C7 under Fluoroscopic Guidance   08//02/2023 -  Lumbar Interlaminar Epidural Steroid Injection L5/S1   05/03/2023 -  Diagnostic Bilateral L3, L4, and L5 Lumbar Medial Branch Block under Fluoroscopy  - No relief    Surgery:  Medications:   - NSAIDS: avoid due to gastric ulcer   - MSK Relaxants:   - TCAs:   - SNRIs: Venlafaxine  - Topicals: Voltaren  - Opioids: Tramadol   - " Anticonvulsants: Gabapentin    Current Pain Medications  Gabapentin  Tramadol      Full Medication List    Current Outpatient Medications:     albuterol (PROVENTIL) 2.5 mg /3 mL (0.083 %) nebulizer solution, Take 3 mLs (2.5 mg total) by nebulization every 6 (six) hours as needed for Wheezing. Rescue, Disp: 90 each, Rfl: 2    albuterol (PROVENTIL/VENTOLIN HFA) 90 mcg/actuation inhaler, USE 2 INHALATIONS BY MOUTH  4 TIMES DAILY AS NEEDED, Disp: 34 g, Rfl: 3    amLODIPine (NORVASC) 5 MG tablet, Take 1 tablet (5 mg total) by mouth 2 (two) times a day., Disp: 180 tablet, Rfl: 1    ascorbic acid, vitamin C, (VITAMIN C) 500 MG tablet, Take 500 mg by mouth once daily., Disp: , Rfl:     aspirin (ECOTRIN) 81 MG EC tablet, Take 81 mg by mouth once daily., Disp: , Rfl:     atorvastatin (LIPITOR) 80 MG tablet, Take 1 tablet (80 mg total) by mouth once daily., Disp: 90 tablet, Rfl: 0    baclofen (LIORESAL) 10 MG tablet, 1 tab po tid for muscle cramps., Disp: 60 tablet, Rfl: 1    benztropine (COGENTIN) 0.5 MG tablet, TAKE 1 TABLET(0.5 MG) BY MOUTH TWICE DAILY, Disp: 180 tablet, Rfl: 3    budesonide-formoterol 80-4.5 mcg (SYMBICORT) 80-4.5 mcg/actuation HFAA, Inhale 2 puffs into the lungs 2 (two) times a day., Disp: 6.9 g, Rfl: 11    ciclopirox (PENLAC) 8 % Soln, Apply topically nightly. Paint on affected nail(s) once per night, remove residue once per week with alcohol, Disp: 6.6 mL, Rfl: 3    cloNIDine (CATAPRES) 0.1 MG tablet, 1 tab po q day for systolic BP > 160 or DBP >100., Disp: 30 tablet, Rfl: 1    exemestane (AROMASIN) 25 mg tablet, Take 1 tablet (25 mg total) by mouth once daily., Disp: 30 tablet, Rfl: 11    fluticasone (VERAMYST) 27.5 mcg/actuation nasal spray, 2 sprays by Nasal route as needed for Rhinitis., Disp: , Rfl:     fluticasone propionate (FLONASE) 50 mcg/actuation nasal spray, 1 spray by Each Nostril route once daily., Disp: , Rfl:     folic acid (FOLVITE) 1 MG tablet, Take 1 tablet (1,000 mcg total) by mouth  once daily., Disp: 90 tablet, Rfl: 3    gabapentin (NEURONTIN) 100 MG capsule, TAKE 1 CAPSULE(100 MG) BY MOUTH TWICE DAILY AS NEEDED FOR ANXIETY, Disp: 180 capsule, Rfl: 3    HYDROcodone-acetaminophen (NORCO) 5-325 mg per tablet, Take 1 tablet by mouth every 8 (eight) hours as needed for Pain., Disp: 30 tablet, Rfl: 0    isosorbide mononitrate (IMDUR) 30 MG 24 hr tablet, TAKE 1 TABLET(30 MG) BY MOUTH EVERY DAY, Disp: 30 tablet, Rfl: 11    methotrexate 2.5 MG Tab, Take 8 tablets (20 mg total) by mouth every 7 days. Take 4 tabs Mon am and 4 tabs Mon pm only, Disp: 96 tablet, Rfl: 0    omega-3 fatty acids/fish oil (FISH OIL-OMEGA-3 FATTY ACIDS) 300-1,000 mg capsule, Take by mouth once daily., Disp: , Rfl:     omeprazole (PRILOSEC) 40 MG capsule, Take 1 capsule (40 mg total) by mouth every morning., Disp: 90 capsule, Rfl: 3    risperiDONE (RISPERDAL) 2 MG tablet, TAKE 1 TABLET(2 MG) BY MOUTH EVERY MORNING, Disp: 90 tablet, Rfl: 3    risperiDONE (RISPERDAL) 4 MG tablet, TAKE 1 TABLET(4 MG) BY MOUTH EVERY EVENING, Disp: 90 tablet, Rfl: 3    venlafaxine (EFFEXOR-XR) 75 MG 24 hr capsule, Take 1 capsule (75 mg total) by mouth once daily., Disp: 90 capsule, Rfl: 3    vitamin D (VITAMIN D3) 1000 units Tab, Take 1,000 Units by mouth once daily., Disp: , Rfl:     vitamin E 200 UNIT capsule, Take 200 Units by mouth once daily., Disp: , Rfl:     zinc gluconate 50 mg tablet, Take 50 mg by mouth once daily., Disp: , Rfl:   No current facility-administered medications for this visit.    Facility-Administered Medications Ordered in Other Visits:     tropicamide 1% ophthalmic solution 1 drop, 1 drop, Right Eye, On Call Procedure, Karen Song MD, 1 drop at 08/01/19 0648     Review of Systems  ROS  REVIEW OF SYSTEMS:    GENERAL:  No weight loss, malaise or fevers.  HEENT:   No recent changes in vision or hearing  NECK:  No difficulty with swallowing. No stridor.   RESPIRATORY:  Negative for cough, wheezing or shortness of breath,  patient denies any recent URI.  CARDIOVASCULAR:  Negative for chest pain, leg swelling or palpitations. +HTN  GI:  Negative for abdominal discomfort, blood in stools or black stools or change in bowel habits.  MUSCULOSKELETAL:  See HPI.  SKIN:  Negative for lesions, rash, and itching.  PSYCH:  No mood disorder or recent psychosocial stressors.   +anxiety +depression   HEMATOLOGY/LYMPHOLOGY:  Negative for prolonged bleeding, bruising easily or swollen nodes.  Patient is not currently taking any anti-coagulants  NEURO:   No history of headaches, syncope, paralysis, seizures or tremors.  All other reviewed and negative other than HPI.      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, estrogen receptor positive 2022    Cataract     Depression     Dry eyes     Dry mouth     Duane's syndrome of right eye     Early dry stage nonexudative age-related macular degeneration of both eyes 2022    Fibromyalgia 2014    Fibromyalgia     Fractured hip     RIGHT HIP    GERD (gastroesophageal reflux disease)     History of psychiatric hospitalization     Hyperlipidemia 1992    Hypertension     Hypocalcemia     Hyponatremia     Kidney stone     Migraine headache     Osteoporosis     Pressure ulcer of unspecified site, unspecified stage     Psoriatic arthritis 1992    Recurrent upper respiratory infection (URI)     Right knee pain     post knee replacement surgery (possible rejectiion of metal)    RLS (restless legs syndrome)     Schizophrenia 1992    stable on meds    Sciatica     Squamous cell carcinoma of skin     Urinary tract infection         Surgical History  Past Surgical History:   Procedure Laterality Date    brow ptosis repair Right 2019    Surgeon: Dr. Karla Henson    CATARACT EXTRACTION       SECTION       COLONOSCOPY N/A 05/30/2016    Procedure: COLONOSCOPY;  Surgeon: ANDRIA Connelly MD;  [facility]  Service: Endoscopy;  Laterality: N/A;    COLONOSCOPY N/A 12/09/2022    Procedure: COLONOSCOPY;  Surgeon: Mikey Walters MD;  Location: [facility] ENDO;  Service: Endoscopy;  Laterality: N/A;    cyst removed from right sinus  07/09/1982    EPIDURAL STEROID INJECTION INTO CERVICAL SPINE N/A 12/08/2023    Procedure: C6-7 SOPHY;  Surgeon: Spring Jensen MD;  Location: [facility] PAIN MANAGEMENT;  Service: Pain Management;  Laterality: N/A;  20 mins    EPIDURAL STEROID INJECTION INTO LUMBAR SPINE N/A 08/02/2023    Procedure: Injection-steroid-epidural-lumbar L5-S1;  Surgeon: Chirag Kim MD;  Location: [facility] PAIN MANAGEMENT;  Service: Pain Management;  Laterality: N/A;  asa    ESOPHAGOGASTRODUODENOSCOPY N/A 02/07/2023    Procedure: EGD (ESOPHAGOGASTRODUODENOSCOPY);  Surgeon: Dougie Shea MD;  Location: [facility];  Service: Endoscopy;  Laterality: N/A;    HYSTERECTOMY      VAGINAL HYSTERECTOMY WITHOUT BSO - ENDOMETRIOSIS    INJECTION FOR SENTINEL NODE IDENTIFICATION Right 05/09/2022    Procedure: INJECTION, FOR SENTINEL NODE IDENTIFICATION-Right;  Surgeon: NEAL Dhillon MD;  Location: [facility];  Service: General;  Laterality: Right;    INJECTION OF ANESTHETIC AGENT AROUND MEDIAL BRANCH NERVES INNERVATING LUMBAR FACET JOINT N/A 04/11/2019    Procedure: Lumbo-sacral Block, DR5 and Lateral Branches of S1,S2, S3;  Surgeon: Michelle Pineda Jr., MD;  Location: [facility] PAIN MGT;  Service: Pain Management;  Laterality: N/A;  Pt takes  and states she holds ASA on her own whenever she has procedures.  Instructed to hold x 3 days prior to procedure.      INJECTION OF ANESTHETIC AGENT AROUND MEDIAL BRANCH NERVES INNERVATING LUMBAR FACET JOINT Bilateral 05/03/2023    Procedure: Block-nerve-medial branch-lumbar bilateral L3, L4, L5;  Surgeon: Maile Storm DO;  Location: [facility] PAIN MGT;  Service: Pain Management;   Laterality: Bilateral;  asa    INJECTION OF ANESTHETIC AGENT INTO SACROILIAC JOINT Right 08/30/2018    Procedure: BLOCK, SACROILIAC JOINT-Right- ORAL SEDATION;  Surgeon: Michelle Pineda Jr., MD;  Location: [facility] PAIN MG;  Service: Pain Management;  Laterality: Right;    INJECTION OF ANESTHETIC AGENT INTO SACROILIAC JOINT Bilateral 09/27/2018    Procedure: BLOCK, SACROILIAC JOINT-BILATERAL;  Surgeon: Michelle Pineda Jr., MD;  Location: [facility] PAIN MGT;  Service: Pain Management;  Laterality: Bilateral;  Please keep at 10:00 due to trasnsportation    INJECTION OF ANESTHETIC AGENT INTO SACROILIAC JOINT Bilateral 02/07/2019    Procedure: Bilateral Sacroiliac Joint Injection - Per Dr Pineda, not necessary to hold ASA.;  Surgeon: Michelle Pineda Jr., MD;  Location: [facility] PAIN MGT;  Service: Pain Management;  Laterality: Bilateral;    INTRAOCULAR PROSTHESES INSERTION Right 08/01/2019    Procedure: INSERTION, IOL PROSTHESIS;  Surgeon: Karen Song MD;  Location: [address];  Service: Ophthalmology;  Laterality: Right;    INTRAOCULAR PROSTHESES INSERTION Left 09/26/2019    Procedure: INSERTION, IOL PROSTHESIS;  Surgeon: Karen Song MD;  Location: [address];  Service: Ophthalmology;  Laterality: Left;    JOINT REPLACEMENT Right     knee    KNEE SURGERY      MASTECTOMY Right 05/09/2022    Procedure: MASTECTOMY-Right;  Surgeon: NEAL Dhillon MD;  Location: [facility];  Service: General;  Laterality: Right;  2.5 HOURS    PHACOEMULSIFICATION OF CATARACT Right 08/01/2019    Procedure: PHACOEMULSIFICATION, CATARACT;  Surgeon: Karen Song MD;  Location: [address];  Service: Ophthalmology;  Laterality: Right;    PHACOEMULSIFICATION OF CATARACT Left 09/26/2019    Procedure: PHACOEMULSIFICATION, CATARACT;  Surgeon: Karen Song MD;  Location: [facility] OR [address] [place] [address];  Service: Ophthalmology;  Laterality: Left;    RADIOFREQUENCY THERMOCOAGULATION Right 05/07/2019    Procedure: RADIOFREQUENCY THERMAL  "COAGULATION RIGHT DORSAL RAMUS 5 AND LATERAL BRANCH OF S1, S2 AND S3;  Surgeon: Michelle Pineda Jr., MD;  Location: [facility];  Service: Pain Management;  Laterality: Right;    RADIOFREQUENCY THERMOCOAGULATION Left 05/14/2019    Procedure: RADIOFREQUENCY THERMAL COAGULATION LEFT DORSAL RAMUS 5 AND LATERAL BRANCH OF S1,S2 AND S3;  Surgeon: Michelle Pineda Jr., MD;  Location: [facility];  Service: Pain Management;  Laterality: Left;    RADIOFREQUENCY THERMOCOAGULATION Right 10/22/2019    Procedure: RADIOFREQUENCY THERMAL COAGULATION---DARSAL RAMUS 5 and LATERAL S1,S2,and S3 Right;  Surgeon: Michelle Pineda Jr., MD;  Location: [facility];  Service: Pain Management;  Laterality: Right;  patient to sign consent DOS    RADIOFREQUENCY THERMOCOAGULATION Left 10/29/2019    Procedure: RADIOFREQUENCY THERMAL COAGULATION - LEFT - DR5, S1,S2, AND S3;  Surgeon: Michelle Pineda Jr., MD;  Location: [facility];  Service: Pain Management;  Laterality: Left;    RADIOFREQUENCY THERMOCOAGULATION Left 05/26/2020    Procedure: RADIOFREQUENCY THERMAL COAGULATION--Left DR5+ lateral branches of S1, S2, S3;  Surgeon: Michelle Pineda Jr., MD;  Location: [facility];  Service: Pain Management;  Laterality: Left;    RADIOFREQUENCY THERMOCOAGULATION Right 06/02/2020    Procedure: RADIOFREQUENCY THERMAL COAGULATION--Right DR5+ lateral branches of S1, S2, S3;  Surgeon: Michelle Pineda Jr., MD;  Location: [facility];  Service: Pain Management;  Laterality: Right;    SENTINEL LYMPH NODE BIOPSY Right 05/09/2022    Procedure: BIOPSY, LYMPH NODE, SENTINEL-Right;  Surgeon: NEAL Dhillon MD;  Location: [facility];  Service: General;  Laterality: Right;    SINUS SURGERY      Surgery on right knee  07/09/1982    TONSILLECTOMY      tumor removed from back left side upper shoulder  03/17/2006        Physical Exam  Vitals:    04/02/24 1304   BP: (!) 166/65   Pulse: 73   Weight: 46.9 kg (103 lb 4.6 oz)   Height: 5' 2" (1.575 m) "   PainSc:   9   PainLoc: Back       Ortho/SPM Exam  PHYSICAL EXAMINATION:    GENERAL: Well appearing, in no acute distress, alert and oriented x3.  PSYCH:  Mood and affect appropriate.  SKIN: Skin color, texture, turgor normal, no rashes or lesions.  HEAD/FACE:  Normocephalic, atraumatic. Cranial nerves grossly intact.  NECK: Normal ROM. Supple.   CV: RRR with palpation of the radial artery.  PULM: No evidence of respiratory difficulty, symmetric chest rise.  GI:  Soft and non-distended.  MSK: Straight leg raising is  negative to radicular pain. There is pain to palpation over the facet joints of the lumbar spine. There is pain with lumbar facet loading. There is pain over the SI joints. +HELIO. +[place]len. +Sacral Thurst. Normal range of motion without pain reproduction with lumbar flexion. Pain with extension.  Peripheral joint ROM is full and pain free without obvious instability or laxity in all four extremities. No deformities, edema, or skin discoloration.  No atrophy or tone abnormalities are noted.   NEURO: Bilateral upper and lower extremity coordination and strength is symmetric.  No loss of sensation is noted.  MENTAL STATUS: A x O x 3, good concentration, speech is fluent and goal directed  MOTOR: 5/5 in all muscle groups  GAIT: normal.  Ambulates unassisted.    Imaging  MRI LUMBAR SPINE WITHOUT CONTRAST     CLINICAL HISTORY:  low back pain; Sacrococcygeal disorders, not elsewhere classified     TECHNIQUE:  Multiplanar, multisequence MR images were acquired from the thoracolumbar junction to the sacrum without contrast.     COMPARISON:  03/06/2023     FINDINGS:  Alignment: Normal.     Vertebrae: Degenerative endplate changes throughout the lumbar spine.     Discs: Moderate to severe disc height loss throughout the lumbar spine.  No evidence for discitis.     Cord: Conus terminates at L2 and appears unremarkable.  Probable thin fatty filum terminale noted.     Degenerative findings:     T12-L1: Small  central protrusion.  No spinal canal stenosis or neural foraminal narrowing.     L1-L2: Circumferential disc bulge with right paracentral/foraminal protrusion result in mild effacement of the thecal sac and moderate right neural foraminal narrowing.     L2-L3: Circumferential disc bulge.  No spinal canal stenosis or neural foraminal narrowing.     L3-L4: Circumferential disc bulge with left paracentral disc extrusion and moderate facet arthropathy result in effacement of the left lateral recess and severe left neural foraminal narrowing.     L4-L5: Circumferential disc bulge and mild facet arthropathy result in mild effacement of the thecal sac and moderate left, mild right neural foraminal narrowing.     L5-S1: Circumferential disc bulge and moderate facet arthropathy result in mild effacement of the thecal sac and severe right, moderate left neural foraminal narrowing.     Paraspinal muscles & soft tissues: Mild paraspinal muscle atrophy.  Questionable gallbladder wall thickening and small stones.     Impression:     1. Multilevel degenerative changes of the lumbar spine detailed above.  Moderate to severe neural foraminal narrowing at L1-L2 and L3-S1.  2. Questionable gallbladder wall thickening and small stones.  Recommend further evaluation with abdominal ultrasound.        Electronically signed by: Akash Black MD  Date:                                            03/12/2023      Labs:  BMP  Lab Results   Component Value Date     03/11/2024    K 3.9 03/11/2024     03/11/2024    CO2 29 03/11/2024    BUN 5 (L) 03/11/2024    CREATININE 0.8 03/11/2024    CALCIUM 9.1 03/11/2024    ANIONGAP 6 (L) 03/11/2024    EGFRNORACEVR >60.0 03/11/2024     Lab Results   Component Value Date    ALT 16 02/07/2024    AST 22 02/07/2024    GGT 91 (H) 07/28/2014    ALKPHOS 69 02/07/2024    BILITOT 0.4 02/07/2024       Assessment:  Problem List Items Addressed This Visit          Orthopedic    Low back pain    Relevant  Orders    Procedure Order to Pain Management    Bilateral sacroiliitis - Primary    Relevant Orders    Procedure Order to Pain Management    Procedure Order to Pain Management     Other Visit Diagnoses       SI (sacroiliac) joint dysfunction                  03/14/2023 -Bhavna Landry is a 68 y.o. 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, estrogen receptor positive (04/13/2022), Cataract, Depression, Dry eyes, Dry mouth, Duane's syndrome of right eye, Early dry stage nonexudative age-related macular degeneration of both eyes (09/20/2022), Fibromyalgia (04/17/2014), Fibromyalgia, Fractured hip, GERD (gastroesophageal reflux disease), History of psychiatric hospitalization, Hyperlipidemia (02/02/1992), Hypertension, Hypocalcemia, Hyponatremia, Kidney stone, Migraine headache, Osteoporosis, Pressure ulcer of unspecified site, unspecified stage, Psoriatic arthritis (02/02/1992), Recurrent upper respiratory infection (URI), Right knee pain, RLS (restless legs syndrome), Schizophrenia (02/02/1992), Sciatica, Squamous cell carcinoma of skin, and Urinary tract infection.  By history and examination this patient has chronic low back pain without radiculopathy.  The underlying cause cause is facet arthritis and deconditioning.  Pathology is confirmed by imaging.  We discussed the underlying diagnoses and multiple treatment options including non-opioid medications, interventional procedures, physical therapy, and home exercise.  The risks and benefits of each treatment option were discussed and all questions were answered.      5/22/2023- 66 y/o female with a Hx of chronic low back  and bilateral leg pain she is s/p a diagnosis lumbar MBB targeting L4/5 and L5/S1 reporting 0% relief of her symptoms. Her Lumbar MRI multilevel disc bulges starting   at L3 through S1.  She has severe right,  moderate left neural foraminal narrowing at L5-S1 thta may be causing her Low back and leg pain.  Her pain was refractory to diagnositic low lumbar MBB so i will attempt a L4-5 Lumbar SOPHY.     04/02/2024 Bhavna Landry presents for follow up of her low back/SI joint pain.  Patient has previously undergone bilateral L5 Dorsal Ramus and Lateral Branches of S1, S2, and S3 RFA with significant and lasting relief.  Prior RFA lasted about 2 years.  She would like to repeat the procedure as the pain has now returned.      Plan & Recommendations  Procedures:  Scheduled for repeat bilateral L5 Dorsal Ramus and Lateral Branches of S1, S2, and S3 RFA  Medications: Continue medications as previously prescribed.   Imaging: reviewed and discussed in detail  PT/OT/HEP: I encouraged the patient to maintain a home exercise regimen that includes daily, moderate cardiovascular exercise lasting at least 30 minutes.  This may include yoga, gela chi, walking, swimming, aqua aerobics, or other exercises that maintain a heart rate of 50-70% of the calculated maximum heart rate.  I also encouraged light, daily stretching focused on the target area.  Follow Up: RTC 4 weeks post procedure    Maile Storm DO   Interventional Pain Management        Disclaimer: This note was partly generated using dictation software which may occasionally result in transcription errors.

## 2024-04-02 NOTE — TELEPHONE ENCOUNTER
----- Message from aNni Storm DO sent at 2024 12:08 PM CDT -----  Regarding: Order for PHUONG HAZEL    Patient Name: PHUONG HAZEL([id number])  Sex: Female  : 1955      PCP: MILLIE TELLO    Center: [facility] CENTRAL BILLING OFFICE     Types of orders made on 2024: Outpatient Referral, Procedure Request    Order Date:2024  Ordering User:NANI STORM [[id number]]  Encounter Provider:Nani Storm DO [[id number]]    Authorizing Provider: Nani Storm DO [[id number]]  Department:[facility] PAIN MANAGEMENT[[id number]]    Common Order Information  Procedure -> Radiofrequency Ablation (Specify level and laterality) Cmt: RIGHT             L5 Dorsal Ramus and RIGHT Lateral Branches of S1, S2, and S3    Pre-op Diagnosis -> Bilateral sacroiliitis     Order Specific Information  Order: Procedure Order to Pain Management [Custom: [id number]]  Order #:            [id number]: 1 FUTURE    Priority: Routine  Class: Clinic Performed    Future Order Information      Expires on:2025            Expected by:2024                   Associated Diagnoses      M54.50, G89.29 Chronic low back pain without sciatica, unspecified back       pain laterality      M46.1 Bilateral sacroiliitis      Physician -> Emeterio         Is patient on anti-coagulants? -> Yes C  mt: ASA        Facility Name: -> [place]         Follow-up: -> 4 weeks Cmt: Daniel Brar          Priority: Routine  Class: Clinic Performed    Future Order Information      Expires on:2025            Expected by:2024                   Associated Diagnoses      M54.50, G89.29 Chronic low back pain without sciatica, unspecified back       pain laterality      M46.1 Bilateral sacroiliitis      Procedure -  > Radiofrequency Ablation (Specify level and laterality) Cmt:                 RIGHT L5 Dorsal Ramus and RIGHT Lateral Branches of S1, S2, and S3        Physician -> Emeterio         Is patient on anti-coagulants? -> Yes Cmt: ASA        Pre-op  Diagnosis -> Bilateral sacroiliitis         Facility Name: -> [place]         Follow-up: -> 4 weeks Cmt: Daniel Brar

## 2024-04-03 ENCOUNTER — TELEPHONE (OUTPATIENT)
Dept: PAIN MEDICINE | Facility: CLINIC | Age: 69
End: 2024-04-03
Payer: MEDICARE

## 2024-04-03 ENCOUNTER — LAB VISIT (OUTPATIENT)
Dept: LAB | Facility: HOSPITAL | Age: 69
End: 2024-04-03
Attending: STUDENT IN AN ORGANIZED HEALTH CARE EDUCATION/TRAINING PROGRAM
Payer: MEDICARE

## 2024-04-03 DIAGNOSIS — D83.9 CVID (COMMON VARIABLE IMMUNODEFICIENCY): ICD-10-CM

## 2024-04-03 DIAGNOSIS — M46.1 BILATERAL SACROILIITIS: Primary | ICD-10-CM

## 2024-04-03 DIAGNOSIS — Z86.19 FREQUENT INFECTIONS: ICD-10-CM

## 2024-04-03 PROCEDURE — 36415 COLL VENOUS BLD VENIPUNCTURE: CPT | Mod: PO | Performed by: STUDENT IN AN ORGANIZED HEALTH CARE EDUCATION/TRAINING PROGRAM

## 2024-04-03 PROCEDURE — 86317 IMMUNOASSAY INFECTIOUS AGENT: CPT | Performed by: STUDENT IN AN ORGANIZED HEALTH CARE EDUCATION/TRAINING PROGRAM

## 2024-04-03 NOTE — TELEPHONE ENCOUNTER
----- Message from Nani Storm DO sent at 2024 12:07 PM CDT -----  Regarding: Order for PHUONG HAZEL    Patient Name: PHUONG HAZEL([id number])  Sex: Female  : 1955      PCP: MILLIE TELLO    Center: [facility] CENTRAL BILLING OFFICE     Types of orders made on 2024: Outpatient Referral, Procedure Request    Order Date:2024  Ordering User:NANI STORM [[id number]]  Encounter Provider:Nani Storm DO [[id number]]    Authorizing Provider: Nani Storm DO [[id number]]  Department:[facility] PAIN MANAGEMENT[[id number]]    Common Order Information  Procedure -> Radiofrequency Ablation (Specify level and laterality) Cmt: LEFT             L5 Dorsal Ramus and LEFT Lateral Branches of S1, S2, and S3    Pre-op Diagnosis -> Sacroiliitis     Order Specific Information  Order: Procedure Order to Pain Management [Custom: [id number]]  Order #:          1  [id number]: 1 FUTURE    Priority: Routine  Class: Clinic Performed    Future Order Information      Expires on:2025            Expected by:2024                   Associated Diagnoses      M46.1 Bilateral sacroiliitis      Physician -> Gelter         Is patient on anti-coagulants? Cmt: ASA        Facility Name: -> [place]         Follow-up: -> 4 weeks Cmt: Daniel Brar          Priority: Rout  ine  Class: Clinic Performed    Future Order Information      Expires on:2025            Expected by:2024                   Associated Diagnoses      M46.1 Bilateral sacroiliitis      Procedure -> Radiofrequency Ablation (Specify level and laterality) Cmt:                 LEFT L5 Dorsal Ramus and LEFT Lateral Branches of S1, S2, and S3        Physician -> Gelter         Is patient on anti-coagulants? Cmt: ASA          Pre-op Diagnosis -> Sacroiliitis         Facility Name: -> [place]         Follow-up: -> 4 weeks Cmt: Daniel Brar

## 2024-04-03 NOTE — TELEPHONE ENCOUNTER
----- Message from Sadaf Vargas MD sent at 4/2/2024  5:03 PM CDT -----  Regarding: RE: Clearance to stop Aspirin  Ok to stop ASA 5 days before procedure.  ----- Message -----  From: Mireya Fields  Sent: 4/2/2024   4:40 PM CDT  To: Sadaf Vargas MD; Alicia CASTANEDA Staff  Subject: Clearance to stop Aspirin                        Good afternoon,   We are scheduling Bhavna Lanlauri for  RIGHT L5 Dorsal Ramus and RIGHT Lateral Branches of S1, S2, and S3 on 4/24/24 and then the Left side on 5/1 and we are requesting clearance for her to stop her ASA 5 days prior to each of these procedures    Thank you in advance   Mireya

## 2024-04-05 ENCOUNTER — TELEPHONE (OUTPATIENT)
Dept: PAIN MEDICINE | Facility: CLINIC | Age: 69
End: 2024-04-05
Payer: MEDICARE

## 2024-04-05 NOTE — TELEPHONE ENCOUNTER
----- Message from Josue Brown MA sent at 4/5/2024  9:18 AM CDT -----    ----- Message -----  From: Felecia Arechiga  Sent: 4/5/2024   8:20 AM CDT  To: Emeterio Gr Staff    Pt would like a call back to discuss time for appt on 4/24 because of ride    Can be reached at [phone]

## 2024-04-05 NOTE — TELEPHONE ENCOUNTER
----- Message from Annmarie Barrett sent at 4/5/2024  8:25 AM CDT -----  Contact: pt  Type:  RX Refill Request    Who Called: pt   Refill or New Rx:refill  RX Name and Strength:isosorbide mononitrate (IMDUR) 30 MG 24 hr tablet  Preferred Pharmacy with phone number:[id number] - [place] - [address] AT [facility] [address]  [address] [address]  Phone: [phone] Fax: [phone]  Local or Mail Order:local  Ordering Provider:Yousef  Would the patient rather a call back or a response via [facility]? call  Best Call Back Number:[phone]  Additional Information:   Pt stated she is out   Pharm sent over request 2 weeks ago

## 2024-04-07 DIAGNOSIS — E78.5 HYPERLIPIDEMIA, UNSPECIFIED HYPERLIPIDEMIA TYPE: ICD-10-CM

## 2024-04-07 NOTE — TELEPHONE ENCOUNTER
Care Due:                  Date            Visit Type   Department     Provider  --------------------------------------------------------------------------------                                EP -                              PRIMARY      [facility] INTERNAL  Last Visit: 03-      CARE ([facility])   MEDICINE       MILLIE TELLO  Next Visit: None Scheduled  None         None Found                                                            Last  Test          Frequency    Reason                     Performed    Due Date  --------------------------------------------------------------------------------    Lipid Panel.  12 months..  atorvastatin.............  05- 05-    [facility] Embedded Care Due Messages. Reference number: [id number].   4/07/2024 5:52:07 PM CDT

## 2024-04-08 ENCOUNTER — PATIENT MESSAGE (OUTPATIENT)
Dept: INTERNAL MEDICINE | Facility: CLINIC | Age: 69
End: 2024-04-08
Payer: MEDICARE

## 2024-04-08 RX ORDER — HYDROCODONE BITARTRATE AND ACETAMINOPHEN 5; 325 MG/1; MG/1
1 TABLET ORAL EVERY 8 HOURS PRN
Qty: 30 TABLET | Refills: 0 | Status: SHIPPED | OUTPATIENT
Start: 2024-04-08 | End: 2024-04-25 | Stop reason: SDUPTHER

## 2024-04-08 RX ORDER — ISOSORBIDE MONONITRATE 30 MG/1
30 TABLET, EXTENDED RELEASE ORAL DAILY
Qty: 30 TABLET | Refills: 3 | Status: SHIPPED | OUTPATIENT
Start: 2024-04-08

## 2024-04-08 RX ORDER — ATORVASTATIN CALCIUM 80 MG/1
80 TABLET, FILM COATED ORAL
Qty: 90 TABLET | Refills: 0 | Status: SHIPPED | OUTPATIENT
Start: 2024-04-08 | End: 2024-04-16

## 2024-04-08 NOTE — TELEPHONE ENCOUNTER
----- Message from Miriam Nelson sent at 4/8/2024  9:41 AM CDT -----  Contact: Pt  [phone]  Prescription refill request.  RX name and strength (copy/paste from chart):    HYDROcodone-acetaminophen (NORCO) 5-325 mg per table  Ibuprofen 600mg    Directions (copy/paste from chart):    Is this a 30 day or 90 day RX:  30 //   Local pharmacy or mail order pharmacy:  local  Pharmacy name and phone # (copy/paste from chart):     [id number] - [place] - [address] [facility]  [address] [address]  Phone: [phone] Fax: [phone]    Additional information:       Pt is in pain and since the procedure isn't scheduled till April 24th she is looking to get the refills placed before then. The ibuprofen scripts was not pulling in the system for reference.

## 2024-04-08 NOTE — TELEPHONE ENCOUNTER
Provider Staff:  Action required for this patient    Requires labs      Please see care gap opportunities below in Care Due Message.    Thanks!  [facility]     Appointments      Date Provider   Last Visit   3/25/2024 Sadaf Vargas MD   Next Visit   4/8/2024 Sadaf Vargas MD     Refill Decision Note   Bhavna Landry  is requesting a refill authorization.  Brief Assessment and Rationale for Refill:  Approve     Medication Therapy Plan:         Comments:     Note composed:1:07 PM 04/08/2024

## 2024-04-08 NOTE — TELEPHONE ENCOUNTER
No care due was identified.  [facility] Embedded Care Due Messages. Reference number: [id number].   4/08/2024 10:00:59 AM CDT

## 2024-04-08 NOTE — TELEPHONE ENCOUNTER
Refill Routing Note   Medication(s) are not appropriate for processing by [facility] for the following reason(s):        No active prescription written by provider    [facility] action(s):  Defer      Medication Therapy Plan: Per note from MA: Good Afternoon,  This above patient is in need of a refill for isosorbide mononitrate. She has not been seen by cardiology since 11/17/2021 but was last seen by PCP on 3/25/24. We are getting her scheduled to follow up but the appointments we have are not available any time soon. Would you be able to refill this for the patient this one time?      Appointments  past 12m or future 3m with PCP    Date Provider   Last Visit   Visit date not found Lolis John MA   Next Visit   Visit date not found Lolis John MA   ED visits in past 90 days: 1        Note composed:3:50 PM 04/08/2024

## 2024-04-09 ENCOUNTER — TELEPHONE (OUTPATIENT)
Dept: ALLERGY | Facility: CLINIC | Age: 69
End: 2024-04-09
Payer: MEDICARE

## 2024-04-09 NOTE — TELEPHONE ENCOUNTER
----- Message from Jeff Benítez MD sent at 4/8/2024  8:07 PM CDT -----  Regarding: RE: appt access  Contact: pt @ [phone]  Please let her know the lab is still in process. I will send her a portal message once it results.     Jeff Benítez MD  Allergy/Immunology    ----- Message -----  From: Saleem Fritz MA  Sent: 4/8/2024   2:56 PM CDT  To: Jeff Benítez MD  Subject: FW: appt access                                  Good afternoon Dr. Benítez,    This patient is asking about her Streptococcus Pneumoniae IgG lab results. Please advise.    Thank you,  BALBIR Monge   ----- Message -----  From: Jerome Rajput  Sent: 4/8/2024  12:07 PM CDT  To: Jackelin Aguilar Staff  Subject: appt access                                      Pt is calling to be advised of results from Labs on 04/03. Please call to further advise pt. Thank you for all you are doing.

## 2024-04-10 ENCOUNTER — PATIENT MESSAGE (OUTPATIENT)
Dept: ALLERGY | Facility: CLINIC | Age: 69
End: 2024-04-10
Payer: MEDICARE

## 2024-04-10 LAB
IMMUNOLOGIST REVIEW: NORMAL
S PN DA SERO 19F IGG SER-MCNC: 1 MCG/ML
S PNEUM DA 1 IGG SER-MCNC: 2.2 MCG/ML
S PNEUM DA 10A IGG SER-MCNC: 6 MCG/ML
S PNEUM DA 11A IGG SER-MCNC: 0.1 MCG/ML
S PNEUM DA 12F IGG SER-MCNC: <0.1 MCG/ML
S PNEUM DA 14 IGG SER-MCNC: 0.3 MCG/ML
S PNEUM DA 15B IGG SER-MCNC: 0.2 MCG/ML
S PNEUM DA 17F IGG SER-MCNC: 0.3 MCG/ML
S PNEUM DA 18C IGG SER-MCNC: 1 MCG/ML
S PNEUM DA 19A IGG SER-MCNC: 3.6 MCG/ML
S PNEUM DA 2 IGG SER-MCNC: 0.3 MCG/ML
S PNEUM DA 20A IGG SER-MCNC: 0.9 MCG/ML
S PNEUM DA 22F IGG SER-MCNC: 0.1 MCG/ML
S PNEUM DA 23F IGG SER-MCNC: 0.2 MCG/ML
S PNEUM DA 3 IGG SER-MCNC: 0.1 MCG/ML
S PNEUM DA 33F IGG SER-MCNC: 0.3 MCG/ML
S PNEUM DA 4 IGG SER-MCNC: 0.1 MCG/ML
S PNEUM DA 5 IGG SER-MCNC: 2.1 MCG/ML
S PNEUM DA 6B IGG SER-MCNC: 0.3 MCG/ML
S PNEUM DA 7F IGG SER-MCNC: 1.6 MCG/ML
S PNEUM DA 8 IGG SER-MCNC: 0.2 MCG/ML
S PNEUM DA 9N IGG SER-MCNC: 0.1 MCG/ML
S PNEUM DA 9V IGG SER-MCNC: 0.1 MCG/ML

## 2024-04-15 ENCOUNTER — TELEPHONE (OUTPATIENT)
Dept: ALLERGY | Facility: CLINIC | Age: 69
End: 2024-04-15
Payer: MEDICARE

## 2024-04-15 DIAGNOSIS — E78.5 HYPERLIPIDEMIA, UNSPECIFIED HYPERLIPIDEMIA TYPE: ICD-10-CM

## 2024-04-15 RX ORDER — ATORVASTATIN CALCIUM 80 MG/1
80 TABLET, FILM COATED ORAL
Qty: 90 TABLET | Refills: 0 | OUTPATIENT
Start: 2024-04-15

## 2024-04-15 NOTE — TELEPHONE ENCOUNTER
----- Message from Felecia Arechiga sent at 4/15/2024  6:11 AM CDT -----  Pt would like a call back to cancel and reschedule appt    Can be reached at  [phone]

## 2024-04-15 NOTE — TELEPHONE ENCOUNTER
Refill Decision Note   Bhavna Landry  is requesting a refill authorization.  Brief Assessment and Rationale for Refill:  Quick Discontinue     Medication Therapy Plan:  E-Prescribing Status: Receipt confirmed by Amie #[id number] (4/8/24)      Comments:     Note composed:4:07 PM 04/15/2024

## 2024-04-15 NOTE — TELEPHONE ENCOUNTER
No care due was identified.  [facility] Embedded Care Due Messages. Reference number: [id number].   4/15/2024 4:24:18 PM CDT

## 2024-04-15 NOTE — TELEPHONE ENCOUNTER
No care due was identified.  [facility] Embedded Care Due Messages. Reference number: [id number].   4/15/2024 10:06:07 AM CDT

## 2024-04-16 ENCOUNTER — TELEPHONE (OUTPATIENT)
Dept: INTERNAL MEDICINE | Facility: CLINIC | Age: 69
End: 2024-04-16
Payer: MEDICARE

## 2024-04-16 DIAGNOSIS — M25.569 KNEE PAIN, UNSPECIFIED CHRONICITY, UNSPECIFIED LATERALITY: Primary | ICD-10-CM

## 2024-04-16 RX ORDER — ATORVASTATIN CALCIUM 80 MG/1
80 TABLET, FILM COATED ORAL
Qty: 90 TABLET | Refills: 0 | Status: SHIPPED | OUTPATIENT
Start: 2024-04-16

## 2024-04-16 NOTE — TELEPHONE ENCOUNTER
----- Message from Yumiko Bernal sent at 4/16/2024  8:19 AM CDT -----  Contact: [phone]  1MEDICALADVICE     Patient is calling for Medical Advice regarding:need a call back about right knee pain and wants an xray    How long has patient had these symptoms:    Pharmacy name and phone#:    Would like response via [employer]:  call back     Comments:

## 2024-04-16 NOTE — TELEPHONE ENCOUNTER
Bhavna Landry  is requesting a refill authorization.  Brief Assessment and Rationale for Refill:  Approve     Medication Therapy Plan:         Comments:     Note composed:4:40 AM 04/16/2024

## 2024-04-16 NOTE — TELEPHONE ENCOUNTER
Patient has a knee replacement in her right knee she states. Patient states her knee cracked on 4/14/24 and has been hurting ever since. States the pain is a 8.

## 2024-04-17 ENCOUNTER — TELEPHONE (OUTPATIENT)
Dept: PAIN MEDICINE | Facility: CLINIC | Age: 69
End: 2024-04-17
Payer: MEDICARE

## 2024-04-17 NOTE — TELEPHONE ENCOUNTER
----- Message from Jess Rodríguez sent at 4/17/2024  9:06 AM CDT -----  Contact: Patient @ [phone]  1MEDICALADVICE     Patient is calling for Medical Advice regarding:Patient need to know if her XRAY was approved    How long has patient had these symptoms:    Pharmacy name and phone#:    Would like response via [employer]: call    Comments:

## 2024-04-22 ENCOUNTER — TELEPHONE (OUTPATIENT)
Dept: HEMATOLOGY/ONCOLOGY | Facility: CLINIC | Age: 69
End: 2024-04-22
Payer: MEDICARE

## 2024-04-22 NOTE — PRE-PROCEDURE INSTRUCTIONS
Hello ,     Your arrival time is 0940 and is roughly 1 hour before your anticipated procedure time to allow sufficient time for pre-op..  This procedure will take place at the [facility] at the corner of [facility] and [facility].  It is in the [place] next to [facility].  The address is:     [address]  [place] [address]     After entering the building, you will proceed to the second floor where you can check in with registration. You should take any medications that you routinely take for blood pressure, heart medications, thyroid, cholesterol, etc. As directed     The fasting restrictions are dependent on whether or not you are receiving sedation.        You CANNOT drive yourself and must have a .     If you are on blood thinners, you need to follow the anticoagulation instructions that had been discussed previously.  You should only stop the blood thinners if it was approved by your primary care physician or your cardiologist.  In the event that you are not able to stop your blood thinners, a blood thinner was not listed on your medication list, or we were not able to get clearance from your cardiologist, then the procedure may have to be postponed/canceled.      IF you were told to stop your blood thinners, this is how long you should generally hold some of the more common ones.  Remember that stopping blood thinners is only necessary for certain procedures. If you are unsure of your instructions, please call us.   Aspirin - 5 days  Plavix/Clopidogrel - 7 days  Warfarin / Coumadin - 5 days  Eliquis - 3 days  Pradaxa/Dabigatran - 4 days  Xarelto/Rivaroxaban - 3 days     If you are a diabetic, do not take your medication if you will be fasting, but bring it with you.          *HOLD ALL VITAMINS, MINERALS, HERBS (INCLUDING HERBAL TEAS) AND SUPPLEMENTS  *SHOWER WITH ANTIBACTERIAL SOAP (ex:. DIAL) NIGHT BEFORE AND MORNING OF PROCEDURE  *DO NOT APPLY ANY  LOTIONS, OILS, POWDERS, PERFUME/COLOGNE, OINTMENTS, GELS, CREAMS, MAKEUP OR DEODORANT TO YOUR SKIN MORNING OF PROCEDURE  *LEAVE JEWELRY AND ANY VALUABLES AT HOME  *WEAR LOOSE COMFORTABLE CLOTHING (PREFERABLY A BUTTON UP SHIRT) - you will change into a gown for the procedure      Thank you,  Maria Teresa

## 2024-04-22 NOTE — TELEPHONE ENCOUNTER
Spoke to patient to schedule 6 month follow up with labs and continue zometa per dr hancock's bone scan comment. Appts scheduled and confirmed with patient. Message sent to pre service, schedulers, infusion nurses to assist with auth and scheduling treatment.       ----- Message from Catrachito Henao sent at 4/22/2024 10:24 AM CDT -----  Regarding: MRI  Contact: pt.  [phone]  Pt is calling to inquire if the MRI she had with provider Ashli that was done on 3/18 ok for provider. Asking to speak with someone. Patient Requesting Call Back @  [phone]

## 2024-04-24 ENCOUNTER — TELEPHONE (OUTPATIENT)
Dept: PAIN MEDICINE | Facility: CLINIC | Age: 69
End: 2024-04-24
Payer: MEDICARE

## 2024-04-24 ENCOUNTER — HOSPITAL ENCOUNTER (OUTPATIENT)
Facility: HOSPITAL | Age: 69
Discharge: HOME | End: 2024-04-24
Attending: STUDENT IN AN ORGANIZED HEALTH CARE EDUCATION/TRAINING PROGRAM | Admitting: STUDENT IN AN ORGANIZED HEALTH CARE EDUCATION/TRAINING PROGRAM
Payer: MEDICARE

## 2024-04-24 VITALS
OXYGEN SATURATION: 94 % | WEIGHT: 103 LBS | BODY MASS INDEX: 18.95 KG/M2 | DIASTOLIC BLOOD PRESSURE: 70 MMHG | TEMPERATURE: 100 F | HEART RATE: 69 BPM | SYSTOLIC BLOOD PRESSURE: 163 MMHG | RESPIRATION RATE: 15 BRPM | HEIGHT: 62 IN

## 2024-04-24 DIAGNOSIS — G89.29 CHRONIC PAIN: ICD-10-CM

## 2024-04-24 DIAGNOSIS — M53.3 SACROILIAC JOINT DYSFUNCTION: ICD-10-CM

## 2024-04-24 DIAGNOSIS — M46.1 SACROILIITIS: Primary | ICD-10-CM

## 2024-04-24 PROCEDURE — 64625 RF ABLTJ NRV NRVTG SI JT: CPT | Mod: RT,,, | Performed by: STUDENT IN AN ORGANIZED HEALTH CARE EDUCATION/TRAINING PROGRAM

## 2024-04-24 PROCEDURE — 25000003 PHARM REV CODE 250: Performed by: STUDENT IN AN ORGANIZED HEALTH CARE EDUCATION/TRAINING PROGRAM

## 2024-04-24 PROCEDURE — 99152 MOD SED SAME PHYS/QHP 5/>YRS: CPT | Performed by: STUDENT IN AN ORGANIZED HEALTH CARE EDUCATION/TRAINING PROGRAM

## 2024-04-24 PROCEDURE — 63600175 PHARM REV CODE 636 W HCPCS: Performed by: STUDENT IN AN ORGANIZED HEALTH CARE EDUCATION/TRAINING PROGRAM

## 2024-04-24 PROCEDURE — 99152 MOD SED SAME PHYS/QHP 5/>YRS: CPT | Mod: ,,, | Performed by: STUDENT IN AN ORGANIZED HEALTH CARE EDUCATION/TRAINING PROGRAM

## 2024-04-24 PROCEDURE — 99153 MOD SED SAME PHYS/QHP EA: CPT | Performed by: STUDENT IN AN ORGANIZED HEALTH CARE EDUCATION/TRAINING PROGRAM

## 2024-04-24 PROCEDURE — 64625 RF ABLTJ NRV NRVTG SI JT: CPT | Mod: RT | Performed by: STUDENT IN AN ORGANIZED HEALTH CARE EDUCATION/TRAINING PROGRAM

## 2024-04-24 RX ORDER — MIDAZOLAM HYDROCHLORIDE 1 MG/ML
INJECTION INTRAMUSCULAR; INTRAVENOUS
Status: DISCONTINUED | OUTPATIENT
Start: 2024-04-24 | End: 2024-04-24 | Stop reason: HOSPADM

## 2024-04-24 RX ORDER — BUPIVACAINE HYDROCHLORIDE 2.5 MG/ML
INJECTION, SOLUTION EPIDURAL; INFILTRATION; INTRACAUDAL
Status: DISCONTINUED | OUTPATIENT
Start: 2024-04-24 | End: 2024-04-24 | Stop reason: HOSPADM

## 2024-04-24 RX ORDER — ALBUTEROL SULFATE 90 UG/1
AEROSOL, METERED RESPIRATORY (INHALATION)
Qty: 34 G | Refills: 3 | Status: SHIPPED | OUTPATIENT
Start: 2024-04-24

## 2024-04-24 RX ORDER — SODIUM CHLORIDE 9 MG/ML
INJECTION, SOLUTION INTRAVENOUS CONTINUOUS
Status: DISCONTINUED | OUTPATIENT
Start: 2024-04-24 | End: 2024-04-24 | Stop reason: HOSPADM

## 2024-04-24 RX ORDER — LIDOCAINE HYDROCHLORIDE 20 MG/ML
INJECTION, SOLUTION EPIDURAL; INFILTRATION; INTRACAUDAL; PERINEURAL
Status: DISCONTINUED | OUTPATIENT
Start: 2024-04-24 | End: 2024-04-24 | Stop reason: HOSPADM

## 2024-04-24 RX ORDER — FENTANYL CITRATE 50 UG/ML
INJECTION, SOLUTION INTRAMUSCULAR; INTRAVENOUS
Status: DISCONTINUED | OUTPATIENT
Start: 2024-04-24 | End: 2024-04-24 | Stop reason: HOSPADM

## 2024-04-24 RX ORDER — DEXAMETHASONE SODIUM PHOSPHATE 10 MG/ML
INJECTION INTRAMUSCULAR; INTRAVENOUS
Status: DISCONTINUED | OUTPATIENT
Start: 2024-04-24 | End: 2024-04-24 | Stop reason: HOSPADM

## 2024-04-24 NOTE — PLAN OF CARE
Patient discharge instructions reviewed, patient verbalized understanding. Tolerated PO fluids well, denies nausea IV removed, cath tip intact.  Escorted to family via wheelchair.  No concerns voiced. Went over discharge instructions with Alana, who was bringing the pt home, in addition to the pt.

## 2024-04-24 NOTE — OP NOTE
Therapeutic Sacroiliac Joint Radiofrequency Ablation Under Fluoroscopy     The procedure, risks, benefits, and options were discussed with the patient. There are no contraindications to the procedure. The patent expressed understanding and agreed to the procedure. Informed written consent was obtained prior to the start of the procedure and can be found in the patient's chart.        PATIENT NAME: Bhavna Landry   MRN: [id number]     DATE OF PROCEDURE: 04/24/2024     PROCEDURE: Therapeutic Right L5, S1, S2, and S3 Sacroiliac Joint Radiofrequency Ablation under Fluoroscopy    PRE-OP DIAGNOSIS: Chronic low back pain without sciatica, unspecified back pain laterality [M54.50, G89.29]  Bilateral sacroiliitis [M46.1]    POST-OP DIAGNOSIS: Same    PHYSICIAN: Maile Storm DO    ASSISTANTS: None    MEDICATIONS INJECTED:  Preservative-free Kenalog 40mg with 9cc of Bupivicaine 0.25%    LOCAL ANESTHETIC INJECTED:   Xylocaine 2%    SEDATION: Versed 2mg and Fentanyl 50mcg                                                                                                                                                                                     Conscious sedation ordered by M.D. Patient re-evaluation prior to administration of conscious sedation. No changes noted in patient's status from initial evaluation. The patient's vital signs were monitored by RN and patient remained hemodynamically stable throughout the procedure.    Event Time In   Sedation Start 1050   Sedation End 1118       ESTIMATED BLOOD LOSS:  None    COMPLICATIONS:  None     INTERVAL HISTORY: Patient has clinical and imaging findings suggestive of recurrent facet mediated pain. Patient has undergone a previous RFA at specified levels with at least 50% relief for at least 6 months. Successful diagnostic medial branch blocks have been completed within the past 2 years.    TECHNIQUE: Time-out was performed to identify the patient and procedure to be performed.  With the patient laying in a prone position, the surgical area was prepped and draped in the usual sterile fashion using ChloraPrep and fenestrated drape. The levels were determined under fluoroscopic guidance. Skin anesthesia was achieved by injecting Lidocaine 2% over the injection sites. A 20 gauge 10mm curved active tip needles were then advanced along the medial border of the sacroiliac joint with the needle tips overlapping. A total of 7 needles were placed along the length of the SI joint, and one was placed at the level of the medial branch nerve of the sacral ala. There was no motor involvement in the lower extremity. After negative aspiration for blood was confirmed, 1 mL of the medication mixture listed above was injected slowly at each site. This was followed by thermal lesioning at 80 degrees celsius for 90 seconds. The needles were removed and bleeding was nil. A sterile dressing was applied. No specimens collected. The patient tolerated the procedure well and did not have any procedure related motor deficit at the conclusion of the procedure.      The patient was monitored after the procedure in the recovery area. They were given post-procedure and discharge instructions to follow at home. The patient was discharged in a stable condition.      Maile Storm DO

## 2024-04-24 NOTE — DISCHARGE SUMMARY
Discharge Note  Short Stay      SUMMARY     Admit Date: 4/24/2024    Attending Physician: Maile Storm      Discharge Physician: Maile Storm      Discharge Date: 4/24/2024 11:25 AM    Procedure(s) (LRB):  RIGHT L5 Dorsal Ramus and RIGHT Lateral Branches of S1, S2, and S3 (Right)    Final Diagnosis: Chronic low back pain without sciatica, unspecified back pain laterality [M54.50, G89.29]  Bilateral sacroiliitis [M46.1]    Disposition: Home or self care    Patient Instructions:   Current Discharge Medication List        START taking these medications    Details   albuterol (PROVENTIL/VENTOLIN HFA) 90 mcg/actuation inhaler USE 2 INHALATIONS BY MOUTH 4  TIMES DAILY AS NEEDED  Qty: 34 g, Refills: 3    Comments: Please send a replace/new response with 90-Day Supply if appropriate to maximize member benefit. Requesting 1 year supply.           CONTINUE these medications which have NOT CHANGED    Details   amLODIPine (NORVASC) 5 MG tablet Take 1 tablet (5 mg total) by mouth 2 (two) times a day.  Qty: 180 tablet, Refills: 1    Comments: .  Associated Diagnoses: Essential hypertension      ascorbic acid, vitamin C, (VITAMIN C) 500 MG tablet Take 500 mg by mouth once daily.      aspirin (ECOTRIN) 81 MG EC tablet Take 81 mg by mouth once daily.      atorvastatin (LIPITOR) 80 MG tablet TAKE 1 TABLET(80 MG) BY MOUTH EVERY DAY  Qty: 90 tablet, Refills: 0    Associated Diagnoses: Hyperlipidemia, unspecified hyperlipidemia type      benztropine (COGENTIN) 0.5 MG tablet TAKE 1 TABLET(0.5 MG) BY MOUTH TWICE DAILY  Qty: 180 tablet, Refills: 3    Associated Diagnoses: Extrapyramidal symptom      budesonide-formoterol 80-4.5 mcg (SYMBICORT) 80-4.5 mcg/actuation HFAA Inhale 2 puffs into the lungs 2 (two) times a day.  Qty: 6.9 g, Refills: 11      ciclopirox (PENLAC) 8 % Soln Apply topically nightly. Paint on affected nail(s) once per night, remove residue once per week with alcohol  Qty: 6.6 mL, Refills: 3    Associated  Diagnoses: Tinea unguium      cloNIDine (CATAPRES) 0.1 MG tablet 1 tab po q day for systolic BP > 160 or DBP >100.  Qty: 30 tablet, Refills: 1    Comments: .      exemestane (AROMASIN) 25 mg tablet Take 1 tablet (25 mg total) by mouth once daily.  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      fluticasone propionate (FLONASE) 50 mcg/actuation nasal spray 1 spray by Each Nostril route once daily.      folic acid (FOLVITE) 1 MG tablet Take 1 tablet (1,000 mcg total) by mouth once daily.  Qty: 90 tablet, Refills: 3    Comments: Requesting 1 year supply  Associated Diagnoses: Psoriatic arthritis      gabapentin (NEURONTIN) 100 MG capsule TAKE 1 CAPSULE(100 MG) BY MOUTH TWICE DAILY AS NEEDED FOR ANXIETY  Qty: 180 capsule, Refills: 3    Associated Diagnoses: Anxiety      HYDROcodone-acetaminophen (NORCO) 5-325 mg per tablet Take 1 tablet by mouth every 8 (eight) hours as needed for Pain.  Qty: 30 tablet, Refills: 0    Comments: Quantity prescribed more than 7 day supply? Yes, quantity medically necessary      isosorbide mononitrate (IMDUR) 30 MG 24 hr tablet Take 1 tablet (30 mg total) by mouth once daily.  Qty: 30 tablet, Refills: 3      losartan (COZAAR) 25 MG tablet Take 1 tablet (25 mg total) by mouth daily as needed (when SBP above 150).  Qty: 30 tablet, Refills: 0    Comments: .  Associated Diagnoses: Essential hypertension      methotrexate 2.5 MG Tab Take 8 tablets (20 mg total) by mouth every 7 days. Take 4 tabs Mon am and 4 tabs Mon pm only  Qty: 96 tablet, Refills: 0    Associated Diagnoses: Psoriatic arthritis      omega-3 fatty acids/fish oil (FISH OIL-OMEGA-3 FATTY ACIDS) 300-1,000 mg capsule Take by mouth once daily.      omeprazole (PRILOSEC) 40 MG capsule Take 1 capsule (40 mg total) by mouth every  morning.  Qty: 90 capsule, Refills: 3    Associated Diagnoses: Gastroesophageal reflux disease, unspecified whether esophagitis present      !! risperiDONE (RISPERDAL) 4 MG tablet TAKE 1 TABLET(4 MG) BY MOUTH EVERY EVENING  Qty: 90 tablet, Refills: 3    Associated Diagnoses: Paranoid schizophrenia      venlafaxine (EFFEXOR-XR) 75 MG 24 hr capsule Take 1 capsule (75 mg total) by mouth once daily.  Qty: 90 capsule, Refills: 3    Comments: Patient is now taking 75mg daily.  Please cancel all other prescriptions.  Associated Diagnoses: Anxiety; Recurrent major depressive disorder, in full remission      vitamin D (VITAMIN D3) 1000 units Tab Take 1,000 Units by mouth once daily.      vitamin E 200 UNIT capsule Take 200 Units by mouth once daily.      zinc gluconate 50 mg tablet Take 50 mg by mouth once daily.      albuterol (PROVENTIL) 2.5 mg /3 mL (0.083 %) nebulizer solution Take 3 mLs (2.5 mg total) by nebulization every 6 (six) hours as needed for Wheezing. Rescue  Qty: 90 each, Refills: 2    Associated Diagnoses: Chronic bronchitis, unspecified chronic bronchitis type      baclofen (LIORESAL) 10 MG tablet 1 tab po tid for muscle cramps.  Qty: 60 tablet, Refills: 1      !! risperiDONE (RISPERDAL) 2 MG tablet TAKE 1 TABLET(2 MG) BY MOUTH EVERY MORNING  Qty: 90 tablet, Refills: 3    Associated Diagnoses: Paranoid schizophrenia       !! - Potential duplicate medications found. Please discuss with provider.              Discharge Diagnosis: Chronic low back pain without sciatica, unspecified back pain laterality [M54.50, G89.29]  Bilateral sacroiliitis [M46.1]  Condition on Discharge: Stable with no complications to procedure   Diet on Discharge: Same as before.  Activity: as per instruction sheet.  Discharge to: Home with a responsible adult.  Follow up: 2-4 weeks       Please call my office or pager at [phone] if experienced any weakness or loss of sensation, fever > 101.5, pain uncontrolled with oral medications,  persistent nausea/vomiting/or diarrhea, redness or drainage from the incisions, or any other worrisome concerns. If physician on call was not reached or could not communicate with our office for any reason please go to the nearest emergency department

## 2024-04-24 NOTE — TELEPHONE ENCOUNTER
No care due was identified.  [facility] Embedded Care Due Messages. Reference number: [id number].   4/24/2024 5:16:43 AM CDT

## 2024-04-24 NOTE — TELEPHONE ENCOUNTER
Refill Decision Note   Bhavna Gris  is requesting a refill authorization.  Brief Assessment and Rationale for Refill:  Approve     Medication Therapy Plan:        Alert overridden per protocol: Yes   Comments:     Note composed:9:38 AM 04/24/2024

## 2024-04-24 NOTE — DISCHARGE INSTRUCTIONS
Home Care Instructions Pain Management:    1.  DIET:    You may resume your normal diet today.    2.  BATHING:    You may shower with luke warm water.    3.  DRESSING:    You may remove your bandage today.    4.  ACTIVITY LEVEL:      You may resume your normal activities 24 hours after your procedure.    5.  MEDICATIONS:    You may resume your normal medications today.    6.  SPECIAL INSTRUCTIONS:    No heat to the injection site for 24 hours including bath or shower, heating pad, moist heat or hot tubs.    Use an ice pack to the injection site for any pain or discomfort.  Apply ice packs for 20 minute intervals as needed.    If you have received any sedatives by mouth today, you can not drive for 12 hours.    If you have received sedation through an IV, you can not drive for 24 hours.    PLEASE CALL YOUR DOCTOR FOR THE FOLLOWIN.  Redness or swelling around the injection site.  2.  Fever of 101 degrees.  3.  Drainage (pus) from the injection site.  4.  For any continuous bleeding (some dried blood over the incision is normal.)    FOR EMERGENCIES:    If any unusual problems or difficulties occur during clinic hours, call [phone] or dial [phone].    Follow up with with your physician in 2-3 weeks.

## 2024-04-25 ENCOUNTER — TELEPHONE (OUTPATIENT)
Dept: NEUROSURGERY | Facility: CLINIC | Age: 69
End: 2024-04-25
Payer: MEDICARE

## 2024-04-25 DIAGNOSIS — I10 ESSENTIAL HYPERTENSION: Chronic | ICD-10-CM

## 2024-04-25 DIAGNOSIS — M25.561 RIGHT KNEE PAIN, UNSPECIFIED CHRONICITY: Primary | ICD-10-CM

## 2024-04-25 RX ORDER — AMLODIPINE BESYLATE 5 MG/1
5 TABLET ORAL 2 TIMES DAILY
Qty: 180 TABLET | Refills: 1 | Status: SHIPPED | OUTPATIENT
Start: 2024-04-25

## 2024-04-25 RX ORDER — HYDROCODONE BITARTRATE AND ACETAMINOPHEN 5; 325 MG/1; MG/1
1 TABLET ORAL EVERY 8 HOURS PRN
Qty: 30 TABLET | Refills: 0 | Status: SHIPPED | OUTPATIENT
Start: 2024-04-25 | End: 2024-05-09 | Stop reason: SDUPTHER

## 2024-04-25 NOTE — TELEPHONE ENCOUNTER
I called the patient to explain this message to her from Dr. Storm.    The ablation procedure we did was for her low back pain and will take about 3-4 weeks to take full effect as the nerves take time to break down.  Unfortunately, the procedure we did will likely not help with pain that radiates down the leg.  She can apply ice packs to the procedure are and take tylenol as needed for pain.  I am happy to see her in clinic to evaluate her pain further.

## 2024-04-25 NOTE — TELEPHONE ENCOUNTER
No care due was identified.  [facility] Embedded Care Due Messages. Reference number: [id number].   4/25/2024 10:16:04 AM CDT

## 2024-04-25 NOTE — TELEPHONE ENCOUNTER
----- Message from Peggy Padgett sent at 4/25/2024  9:48 AM CDT -----  Contact: [phone]  Requesting an RX refill or new RX.  Is this a refill or new RX: refill 1  RX name and strength (copy/paste from chart):  HYDROcodone-acetaminophen (NORCO) 5-325 mg per tablet  Is this a 30 day or 90 day RX:   Pharmacy name and phone # (copy/paste from chart):  [id number] - [place] - [address] AT [facility]   Phone: [phone]  Fax: [phone]      Requesting an RX refill or new RX.  Is this a refill or new RX: refill 2  RX name and strength (copy/paste from chart):  amLODIPine (NORVASC) 5 MG tablet  Is this a 30 day or 90 day RX:   Pharmacy name and phone # (copy/paste from chart):  [id number] - [place] - [address] AT [facility]   Phone: [phone]  Fax: [phone]        The doctors have asked that we provide their patients with the following 2 reminders -- prescription refills can take up to 72 hours, and a friendly reminder that in the future you can use your [facility] account to request refills:

## 2024-04-26 ENCOUNTER — PATIENT MESSAGE (OUTPATIENT)
Dept: ALLERGY | Facility: CLINIC | Age: 69
End: 2024-04-26
Payer: MEDICARE

## 2024-04-29 ENCOUNTER — TELEPHONE (OUTPATIENT)
Dept: ORTHOPEDICS | Facility: CLINIC | Age: 69
End: 2024-04-29
Payer: MEDICARE

## 2024-04-29 NOTE — TELEPHONE ENCOUNTER
Spoke with patient.  Rescheduled appointment from today to tomorrow due to concern about bad weather this afternoon.

## 2024-04-29 NOTE — TELEPHONE ENCOUNTER
----- Message from Mandy Kimbrough sent at 4/29/2024  8:44 AM CDT -----  Regarding: pt advice  Contact: [phone]  Type:  Patient Returning Call    Who Called: Laurie   Who Left Message for Patient: laurie   Does the patient know what this is regarding?: yes   Would the patient rather a call back or a response via [facility]? Call   Best Call Back Number: [phone]   Additional Information:  Pt is calling. She is requesting to speak to the nurse ab her appt. Please give her a call back

## 2024-04-30 ENCOUNTER — HOSPITAL ENCOUNTER (OUTPATIENT)
Dept: RADIOLOGY | Facility: HOSPITAL | Age: 69
Discharge: HOME | End: 2024-04-30
Attending: PHYSICIAN ASSISTANT
Payer: MEDICARE

## 2024-04-30 ENCOUNTER — OFFICE VISIT (OUTPATIENT)
Dept: ORTHOPEDICS | Facility: CLINIC | Age: 69
End: 2024-04-30
Payer: MEDICARE

## 2024-04-30 VITALS — HEIGHT: 62 IN | BODY MASS INDEX: 18.25 KG/M2 | WEIGHT: 99.19 LBS

## 2024-04-30 DIAGNOSIS — M25.561 RIGHT KNEE PAIN, UNSPECIFIED CHRONICITY: ICD-10-CM

## 2024-04-30 DIAGNOSIS — M25.561 ACUTE PAIN OF RIGHT KNEE: Primary | ICD-10-CM

## 2024-04-30 DIAGNOSIS — M54.10 RADICULAR LEG PAIN: ICD-10-CM

## 2024-04-30 DIAGNOSIS — T84.84XA PAIN IN KNEE REGION AFTER TOTAL KNEE REPLACEMENT, INITIAL ENCOUNTER: ICD-10-CM

## 2024-04-30 DIAGNOSIS — Z96.659 PAIN IN KNEE REGION AFTER TOTAL KNEE REPLACEMENT, INITIAL ENCOUNTER: ICD-10-CM

## 2024-04-30 PROCEDURE — 99999 PR PBB SHADOW E&M-EST. PATIENT-LVL II: CPT | Mod: PBBFAC,,, | Performed by: PHYSICIAN ASSISTANT

## 2024-04-30 PROCEDURE — 73564 X-RAY EXAM KNEE 4 OR MORE: CPT | Mod: TC,PN,RT

## 2024-04-30 PROCEDURE — 1125F AMNT PAIN NOTED PAIN PRSNT: CPT | Mod: CPTII,S$GLB,, | Performed by: PHYSICIAN ASSISTANT

## 2024-04-30 PROCEDURE — 73564 X-RAY EXAM KNEE 4 OR MORE: CPT | Mod: 26,RT,, | Performed by: RADIOLOGY

## 2024-04-30 PROCEDURE — 4010F ACE/ARB THERAPY RXD/TAKEN: CPT | Mod: CPTII,S$GLB,, | Performed by: PHYSICIAN ASSISTANT

## 2024-04-30 PROCEDURE — 3008F BODY MASS INDEX DOCD: CPT | Mod: CPTII,S$GLB,, | Performed by: PHYSICIAN ASSISTANT

## 2024-04-30 PROCEDURE — 99213 OFFICE O/P EST LOW 20 MIN: CPT | Mod: S$GLB,,, | Performed by: PHYSICIAN ASSISTANT

## 2024-04-30 PROCEDURE — 1159F MED LIST DOCD IN RCRD: CPT | Mod: CPTII,S$GLB,, | Performed by: PHYSICIAN ASSISTANT

## 2024-04-30 PROCEDURE — 3288F FALL RISK ASSESSMENT DOCD: CPT | Mod: CPTII,S$GLB,, | Performed by: PHYSICIAN ASSISTANT

## 2024-04-30 PROCEDURE — 1101F PT FALLS ASSESS-DOCD LE1/YR: CPT | Mod: CPTII,S$GLB,, | Performed by: PHYSICIAN ASSISTANT

## 2024-04-30 NOTE — PROGRESS NOTES
Subjective:      Patient ID: Bhavna Landry is a 68 y.o. female.    Chief Complaint: Pain of the Right Knee      69yo F of right knee pain.  She states she noted a crack in her right knee which cause pain.  Her pain is medially, laterally, in her tibia and lateral femur also.  She states this pain is new.  She also has paresthesias in her lateral knee and foot.  She has history of lumbar radiculopathy in his currently being seen by pain management.  She notes back pain which radiates down to her knee and foot also.  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  She takes Norco and Tylenol as needed for pain.        Review of Systems   Constitutional: Negative for chills and fever.   Cardiovascular:  Negative for chest pain.   Respiratory:  Negative for cough.    Hematologic/Lymphatic: Does not bruise/bleed easily.   Skin:  Negative for poor wound healing and rash.   Musculoskeletal:  Positive for back pain, joint pain, myalgias and stiffness.   Gastrointestinal:  Negative for abdominal pain.   Genitourinary:  Negative for bladder incontinence.   Neurological:  Positive for numbness and paresthesias. Negative for dizziness, loss of balance and weakness.   Psychiatric/Behavioral:  Negative for altered mental status.        Review of patient's allergies indicates:   Allergen Reactions    Etanercept Other (See Comments)     Other reaction(s): recurrent infections    Chloramphenicol sod succinate Hives    Codeine Other (See Comments)     Other reaction(s): Stomach upset. Pt states OK with Percocet    Nickel sutures [surgical stainless steel] Dermatitis     Allergic contact dermatitis    Adhesive Rash        Current Outpatient Medications   Medication Sig Dispense Refill    albuterol (PROVENTIL) 2.5 mg /3 mL (0.083 %) nebulizer solution Take 3 mLs (2.5 mg total) by nebulization every 6 (six) hours as needed for Wheezing. Rescue 90 each 2    albuterol  (PROVENTIL/VENTOLIN HFA) 90 mcg/actuation inhaler USE 2 INHALATIONS BY MOUTH 4  TIMES DAILY AS NEEDED 34 g 3    amLODIPine (NORVASC) 5 MG tablet Take 1 tablet (5 mg total) by mouth 2 (two) times a day. 180 tablet 1    ascorbic acid, vitamin C, (VITAMIN C) 500 MG tablet Take 500 mg by mouth once daily.      aspirin (ECOTRIN) 81 MG EC tablet Take 81 mg by mouth once daily.      atorvastatin (LIPITOR) 80 MG tablet TAKE 1 TABLET(80 MG) BY MOUTH EVERY DAY 90 tablet 0    baclofen (LIORESAL) 10 MG tablet 1 tab po tid for muscle cramps. 60 tablet 1    benztropine (COGENTIN) 0.5 MG tablet TAKE 1 TABLET(0.5 MG) BY MOUTH TWICE DAILY 180 tablet 3    budesonide-formoterol 80-4.5 mcg (SYMBICORT) 80-4.5 mcg/actuation HFAA Inhale 2 puffs into the lungs 2 (two) times a day. 6.9 g 11    ciclopirox (PENLAC) 8 % Soln Apply topically nightly. Paint on affected nail(s) once per night, remove residue once per week with alcohol 6.6 mL 3    cloNIDine (CATAPRES) 0.1 MG tablet 1 tab po q day for systolic BP > 160 or DBP >100. 30 tablet 1    exemestane (AROMASIN) 25 mg tablet Take 1 tablet (25 mg total) by mouth once daily. 30 tablet 11    fluticasone (VERAMYST) 27.5 mcg/actuation nasal spray 2 sprays by Nasal route as needed for Rhinitis.      fluticasone propionate (FLONASE) 50 mcg/actuation nasal spray 1 spray by Each Nostril route once daily.      folic acid (FOLVITE) 1 MG tablet Take 1 tablet (1,000 mcg total) by mouth once daily. 90 tablet 3    gabapentin (NEURONTIN) 100 MG capsule TAKE 1 CAPSULE(100 MG) BY MOUTH TWICE DAILY AS NEEDED FOR ANXIETY 180 capsule 3    HYDROcodone-acetaminophen (NORCO) 5-325 mg per tablet Take 1 tablet by mouth every 8 (eight) hours as needed for Pain. 30 tablet 0    isosorbide mononitrate (IMDUR) 30 MG 24 hr tablet Take 1 tablet (30 mg total) by mouth once daily. 30 tablet 3    losartan (COZAAR) 25 MG tablet Take 1 tablet (25 mg total) by mouth daily as needed (when SBP above 150). 30 tablet 0    methotrexate  "2.5 MG Tab Take 8 tablets (20 mg total) by mouth every 7 days. Take 4 tabs Mon am and 4 tabs Mon pm only 96 tablet 0    omega-3 fatty acids/fish oil (FISH OIL-OMEGA-3 FATTY ACIDS) 300-1,000 mg capsule Take by mouth once daily.      omeprazole (PRILOSEC) 40 MG capsule Take 1 capsule (40 mg total) by mouth every morning. 90 capsule 3    risperiDONE (RISPERDAL) 2 MG tablet TAKE 1 TABLET(2 MG) BY MOUTH EVERY MORNING 90 tablet 3    risperiDONE (RISPERDAL) 4 MG tablet TAKE 1 TABLET(4 MG) BY MOUTH EVERY EVENING 90 tablet 3    venlafaxine (EFFEXOR-XR) 75 MG 24 hr capsule Take 1 capsule (75 mg total) by mouth once daily. 90 capsule 3    vitamin D (VITAMIN D3) 1000 units Tab Take 1,000 Units by mouth once daily.      vitamin E 200 UNIT capsule Take 200 Units by mouth once daily.      zinc gluconate 50 mg tablet Take 50 mg by mouth once daily.       No current facility-administered medications for this visit.     Facility-Administered Medications Ordered in Other Visits   Medication Dose Route Frequency Provider Last Rate Last Admin    tropicamide 1% ophthalmic solution 1 drop  1 drop Right Eye On Call Procedure Karen Song MD   1 drop at 08/01/19 0648        The patient's relevant past medical, surgical, and social history was reviewed in Epic.       Objective:      VITAL SIGNS: Ht 5' 2" (1.575 m)   Wt 45 kg (99 lb 3.3 oz)   BMI 18.15 kg/m²     General    Nursing note and vitals reviewed.  Constitutional: She is oriented to person, place, and time. She appears well-developed and well-nourished.   Neurological: She is alert and oriented to person, place, and time.     General Musculoskeletal Exam   Gait: antalgic       Right Knee Exam     Inspection   Scars: present    Tenderness   The patient is tender to palpation of the medial joint line and lateral joint line (tibial shaft, IT band).    Range of Motion   Extension:  10   Flexion:  90     Tests   Ligament Examination   Lachman: normal (-1 to 2mm)   MCL - Valgus: " "normal (0 to 2mm)  LCL - Varus: normal    Other   Sensation: decreased    Muscle Strength   Right Lower Extremity   Quadriceps:  3/5   Hamstring:  3/5      X-Ray Knee Complete 4 Or More Views Right  EXAMINATION:  XR KNEE COMP 4 OR MORE VIEWS RIGHT    CLINICAL HISTORY:  Pain in right knee    FINDINGS:  Knee complete three views right.    No fracture, dislocation, or bone destruction seen.  There is a TKA in place good alignment no complication.    Electronically signed by: John Bazan MD  Date:    04/30/2024  Time:    14:49    I have reviewed the above radiograph and agree with the findings stated by the radiologist.         Assessment:       1. Acute pain of right knee    2. Pain in knee region after total knee replacement, initial encounter    3. Radicular leg pain          Plan:         Bhavna Lorenzo" was seen today for pain.    Diagnoses and all orders for this visit:    Acute pain of right knee    Pain in knee region after total knee replacement, initial encounter  -     Ambulatory referral/consult to Orthopedics  -     Sedimentation rate; Future  -     C-reactive protein; Future    Radicular leg pain      Right knee pain s/p revision TKA in 2014. I believe her symptoms are more related to her lumbar radiculopathy than her knee.  X-rays reveal good alignment.  No evidence of infection of the right knee but will order inflammatory markers to rule this out.  She does have significant muscle weakness in her lower extremities so she may benefit from formal physical therapy if inflammatory markers are within normal limits.  She has an appointment with pain management tomorrow so I recommend she discuss her symptoms with them also. Will leave patient portal message once lab results come back.      Diagnoses and plan discussed with the patient, as well as the expected course and duration of his symptoms.  All questions and concerns were addressed prior to the end of the visit.   Instructed patient to call office if " they have any future questions/concerns or to schedule apt. Patient will return to see me if symptoms worsen or fail to improve    Note dictated with voice recognition software, please excuse any grammatical errors.        Aleena Perdue PA-C   04/30/2024

## 2024-05-01 ENCOUNTER — TELEPHONE (OUTPATIENT)
Dept: PAIN MEDICINE | Facility: CLINIC | Age: 69
End: 2024-05-01
Payer: MEDICARE

## 2024-05-01 ENCOUNTER — PATIENT MESSAGE (OUTPATIENT)
Dept: ORTHOPEDICS | Facility: CLINIC | Age: 69
End: 2024-05-01
Payer: MEDICARE

## 2024-05-01 DIAGNOSIS — G89.29 CHRONIC LEFT-SIDED LOW BACK PAIN WITHOUT SCIATICA: ICD-10-CM

## 2024-05-01 DIAGNOSIS — M54.50 CHRONIC LEFT-SIDED LOW BACK PAIN WITHOUT SCIATICA: ICD-10-CM

## 2024-05-01 DIAGNOSIS — M48.062 SPINAL STENOSIS OF LUMBAR REGION WITH NEUROGENIC CLAUDICATION: ICD-10-CM

## 2024-05-01 DIAGNOSIS — M54.16 LUMBAR RADICULOPATHY: Primary | ICD-10-CM

## 2024-05-01 DIAGNOSIS — T84.84XA PAIN IN KNEE REGION AFTER TOTAL KNEE REPLACEMENT, INITIAL ENCOUNTER: Primary | ICD-10-CM

## 2024-05-01 DIAGNOSIS — M54.10 RADICULAR LEG PAIN: ICD-10-CM

## 2024-05-01 DIAGNOSIS — M51.36 DDD (DEGENERATIVE DISC DISEASE), LUMBAR: ICD-10-CM

## 2024-05-01 DIAGNOSIS — Z96.659 PAIN IN KNEE REGION AFTER TOTAL KNEE REPLACEMENT, INITIAL ENCOUNTER: Primary | ICD-10-CM

## 2024-05-01 NOTE — TELEPHONE ENCOUNTER
Blood work was normal. No evidence of infection. No hardware loosening on Xrays. I believe her symptoms are spine related. Will refer to PT. She has pain management follow up today also.

## 2024-05-02 ENCOUNTER — OFFICE VISIT (OUTPATIENT)
Dept: PAIN MEDICINE | Facility: CLINIC | Age: 69
End: 2024-05-02
Payer: MEDICARE

## 2024-05-02 DIAGNOSIS — M48.062 SPINAL STENOSIS OF LUMBAR REGION WITH NEUROGENIC CLAUDICATION: ICD-10-CM

## 2024-05-02 DIAGNOSIS — M47.816 LUMBAR SPONDYLOSIS: ICD-10-CM

## 2024-05-02 DIAGNOSIS — M54.16 LUMBAR RADICULOPATHY: Primary | ICD-10-CM

## 2024-05-02 DIAGNOSIS — M51.36 DDD (DEGENERATIVE DISC DISEASE), LUMBAR: ICD-10-CM

## 2024-05-02 PROCEDURE — 99214 OFFICE O/P EST MOD 30 MIN: CPT | Mod: 95,,, | Performed by: STUDENT IN AN ORGANIZED HEALTH CARE EDUCATION/TRAINING PROGRAM

## 2024-05-02 PROCEDURE — 4010F ACE/ARB THERAPY RXD/TAKEN: CPT | Mod: CPTII,95,, | Performed by: STUDENT IN AN ORGANIZED HEALTH CARE EDUCATION/TRAINING PROGRAM

## 2024-05-02 NOTE — H&P (VIEW-ONLY)
[facility] -  Established Patient Evaluation    Telemedicine Encounter    Telemedicine Bundle:  This visit was completed during the Coronavirus Crisis to enhance patient safety.  The patient location is: patient's home  The chief complaint leading to consultation is: No chief complaint on file.  Visit type: Virtual visit with synchronous audio and video  Total time spent with patient: 15 mins  Each patient to whom he or she provides medical services by telemedicine is:    (1) informed of the relationship between the physician and patient and the respective role of any other health care provider with respect to management of the patient  (2) notified that he or she may decline to receive medical services by telemedicine and may withdraw from such care at any time.    Chief Complaint  No chief complaint on file.          4/2/2024     1:05 PM 5/22/2023     1:55 PM 3/14/2023     2:10 PM   Last 3 PDI Scores   Pain Disability Index (PDI) 36 11 22     Interval History 05/02/2024  Bhavna Landry presents today virtually for follow up her pain back.  She recently underwent right sided sacral lateral branch RFA and was scheduled to have left sided RFA performed, but her sciatica pain has flared up in the interim.  She would like to hold off on the left sacral lateral branch RFA as her right-sided radicular symptoms are more severe.  Pain radiates down the right lower extremity to the top of the foot.  She needs to use a walker to ambulate due to pain.  She has a history of lumbar radiculopathy and underwent L5/S1 interlaminar SOPHY in 08/2023 with excellent results.  She reports her current radicular symptoms are the same as prior.  She would like to repeat the lumbar epidural steroid injection.  She is currently taking gabapentin which is prescribed by her psychiatrist.  She denies any weakness in the lower extremities, saddle anesthesia, or loss of bowel or bladder function.  She currently rates  "her pain 8/10.       Interval History 04/02/2024:  Bhavna Landry presents today for follow up of her low back pain.  Her low back/sacral pain has returned.  In the past she has responded well to bilateral L5 Dorsal Ramus and Lateral Branches of S1, S2, and S3 RFA.  This is previously given her significant relief lasting several years.  She would like to repeat this procedure.  Denies any recent falls or trauma.  No changes to her pain.  Denies any radicular symptoms.  No weakness, saddle anesthesia, bowel or bladder changes.     Interval History (05/22/2023):  Bhavna Landry returns today for follow up she is s/p a a diagnostic Lumbar MBB targeting L4/5 and L5/S1 that  provided 0% relief of her low back and leg pain R>L. She would like to consider other injection that may help. She denies and new pain, denies B/B dysfunction, denies any profound weakness.     Current Pain Scales:  Current: 5/10              Typical Range: 5-9/10     Interval History (03/14/2023):  Bhavna Landry returns today for low back pain that radiates into the b/l thighs.  Pain is described as clenching pain that has worsened over last 2 years. No trauma or surgery.  Pain is worse with extension.  Pain is better with heat, lying down, gabapentin, tylenol and tramadol.  She is scheduled to start PT next week.  Currently, the bilateral hip and bilateral leg pain is stable.  Avoid NSAIDs due to history of gastric ulcer.     Current Pain Scales:  Current: 7/10              Typical Range: 7-8/10     Background from Chart Review  9/16/2020- Mrs. Landry presents to clinic today reporting left hip pain for the past 3-4 mos, which has not improved with PT.  Pain starts in the lateral left hip "on the bone" and radiates through the buttock, down the lateral thigh not passing the knee, and into the anterolateral thigh.  She endorses regular stretching and states that this pain is distinctly different from the SI pain for which she received SI RFA in the " past.     6/09/2020-   64-year-old female presents status post left then right  DR 5+ lateral branches of the S1, S2 and S3 RFA with reported 100% continued  relief she reports that her pain score today is 0/10.  She reports improvement with her ADLs and overall improvement of her functionality.     05/05/2020  presents tele-medicine appointment for a follow-up appointment for low back, left groin and bilateral hip pain.  Since the last visit, Bhavna Landry states the pain has been persistant. Current pain intensity is 9/10.  Patient reports onset of pain 2 weeks, patient states bending forward and sitting for long periods of time increase her pain.  Pain is described as aching.  She reports no radicular symptoms in either leg.  Worst pain is not out of 10.  Patient is status post right and left L5 Dorsal Ramus and Lateral Branches of S1, S2, and S3 (4 levels) RFA reported 80-90% relief for a minimal 6 months.  Pain is now returned and patient like to reschedule procedure.        Treatment History  PT/OT: yes  HEP: yes  TENS:    Injections:   Bilateral SI joint injections, bilateral  Dorsal ramus block(DR5, S1,S2,S3),  bilateral L5 Dorsal Ramus and Lateral Branches of S1, S2, and S3 (4 levels) RFA, GTB injections   12/08/2023 - Cervical Interlaminar Epidural Steroid Injection C6/C7 under Fluoroscopic Guidance   08//02/2023 -  Lumbar Interlaminar Epidural Steroid Injection L5/S1   05/03/2023 -  Diagnostic Bilateral L3, L4, and L5 Lumbar Medial Branch Block under Fluoroscopy  - No relief    Surgery:  Medications:   - NSAIDS: avoid due to gastric ulcer   - MSK Relaxants:   - TCAs:   - SNRIs: Venlafaxine  - Topicals: Voltaren  - Opioids: Tramadol   - Anticonvulsants: Gabapentin    Current Pain Medications  Gabapentin  Tramadol      Full Medication List    Current Outpatient Medications:     albuterol (PROVENTIL) 2.5 mg /3 mL (0.083 %) nebulizer solution, Take 3 mLs (2.5 mg total) by nebulization every 6 (six) hours as  needed for Wheezing. Rescue, Disp: 90 each, Rfl: 2    albuterol (PROVENTIL/VENTOLIN HFA) 90 mcg/actuation inhaler, USE 2 INHALATIONS BY MOUTH 4  TIMES DAILY AS NEEDED, Disp: 34 g, Rfl: 3    amLODIPine (NORVASC) 5 MG tablet, Take 1 tablet (5 mg total) by mouth 2 (two) times a day., Disp: 180 tablet, Rfl: 1    ascorbic acid, vitamin C, (VITAMIN C) 500 MG tablet, Take 500 mg by mouth once daily., Disp: , Rfl:     aspirin (ECOTRIN) 81 MG EC tablet, Take 81 mg by mouth once daily., Disp: , Rfl:     atorvastatin (LIPITOR) 80 MG tablet, TAKE 1 TABLET(80 MG) BY MOUTH EVERY DAY, Disp: 90 tablet, Rfl: 0    baclofen (LIORESAL) 10 MG tablet, 1 tab po tid for muscle cramps., Disp: 60 tablet, Rfl: 1    benztropine (COGENTIN) 0.5 MG tablet, TAKE 1 TABLET(0.5 MG) BY MOUTH TWICE DAILY, Disp: 180 tablet, Rfl: 3    budesonide-formoterol 80-4.5 mcg (SYMBICORT) 80-4.5 mcg/actuation HFAA, Inhale 2 puffs into the lungs 2 (two) times a day., Disp: 6.9 g, Rfl: 11    ciclopirox (PENLAC) 8 % Soln, Apply topically nightly. Paint on affected nail(s) once per night, remove residue once per week with alcohol, Disp: 6.6 mL, Rfl: 3    cloNIDine (CATAPRES) 0.1 MG tablet, 1 tab po q day for systolic BP > 160 or DBP >100., Disp: 30 tablet, Rfl: 1    exemestane (AROMASIN) 25 mg tablet, Take 1 tablet (25 mg total) by mouth once daily., Disp: 30 tablet, Rfl: 11    fluticasone (VERAMYST) 27.5 mcg/actuation nasal spray, 2 sprays by Nasal route as needed for Rhinitis., Disp: , Rfl:     fluticasone propionate (FLONASE) 50 mcg/actuation nasal spray, 1 spray by Each Nostril route once daily., Disp: , Rfl:     folic acid (FOLVITE) 1 MG tablet, Take 1 tablet (1,000 mcg total) by mouth once daily., Disp: 90 tablet, Rfl: 3    gabapentin (NEURONTIN) 100 MG capsule, TAKE 1 CAPSULE(100 MG) BY MOUTH TWICE DAILY AS NEEDED FOR ANXIETY, Disp: 180 capsule, Rfl: 3    HYDROcodone-acetaminophen (NORCO) 5-325 mg per tablet, Take 1 tablet by mouth every 8 (eight) hours as  needed for Pain., Disp: 30 tablet, Rfl: 0    isosorbide mononitrate (IMDUR) 30 MG 24 hr tablet, Take 1 tablet (30 mg total) by mouth once daily., Disp: 30 tablet, Rfl: 3    losartan (COZAAR) 25 MG tablet, Take 1 tablet (25 mg total) by mouth daily as needed (when SBP above 150)., Disp: 30 tablet, Rfl: 0    methotrexate 2.5 MG Tab, Take 8 tablets (20 mg total) by mouth every 7 days. Take 4 tabs Mon am and 4 tabs Mon pm only, Disp: 96 tablet, Rfl: 0    omega-3 fatty acids/fish oil (FISH OIL-OMEGA-3 FATTY ACIDS) 300-1,000 mg capsule, Take by mouth once daily., Disp: , Rfl:     omeprazole (PRILOSEC) 40 MG capsule, Take 1 capsule (40 mg total) by mouth every morning., Disp: 90 capsule, Rfl: 3    risperiDONE (RISPERDAL) 2 MG tablet, TAKE 1 TABLET(2 MG) BY MOUTH EVERY MORNING, Disp: 90 tablet, Rfl: 3    risperiDONE (RISPERDAL) 4 MG tablet, TAKE 1 TABLET(4 MG) BY MOUTH EVERY EVENING, Disp: 90 tablet, Rfl: 3    venlafaxine (EFFEXOR-XR) 75 MG 24 hr capsule, Take 1 capsule (75 mg total) by mouth once daily., Disp: 90 capsule, Rfl: 3    vitamin D (VITAMIN D3) 1000 units Tab, Take 1,000 Units by mouth once daily., Disp: , Rfl:     vitamin E 200 UNIT capsule, Take 200 Units by mouth once daily., Disp: , Rfl:     zinc gluconate 50 mg tablet, Take 50 mg by mouth once daily., Disp: , Rfl:   No current facility-administered medications for this visit.    Facility-Administered Medications Ordered in Other Visits:     tropicamide 1% ophthalmic solution 1 drop, 1 drop, Right Eye, On Call Procedure, Karen Song MD, 1 drop at 08/01/19 0648     Review of Systems  ROS  REVIEW OF SYSTEMS:    GENERAL:  No weight loss, malaise or fevers.  HEENT:   No recent changes in vision or hearing  NECK:  No difficulty with swallowing. No stridor.   RESPIRATORY:  Negative for cough, wheezing or shortness of breath, patient denies any recent URI.  CARDIOVASCULAR:  Negative for chest pain, leg swelling or palpitations. +HTN  GI:  Negative for  abdominal discomfort, blood in stools or black stools or change in bowel habits.  MUSCULOSKELETAL:  See HPI.  SKIN:  Negative for lesions, rash, and itching.  PSYCH:  No mood disorder or recent psychosocial stressors.   +anxiety +depression   HEMATOLOGY/LYMPHOLOGY:  Negative for prolonged bleeding, bruising easily or swollen nodes.  Patient is not currently taking any anti-coagulants  NEURO:   No history of headaches, syncope, paralysis, seizures or tremors.  All other reviewed and negative other than HPI.      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, estrogen receptor positive 2022    Cataract     Depression     Dry eyes     Dry mouth     Duane's syndrome of right eye     Early dry stage nonexudative age-related macular degeneration of both eyes 2022    Fibromyalgia 2014    Fibromyalgia     Fractured hip     RIGHT HIP    GERD (gastroesophageal reflux disease)     History of psychiatric hospitalization     Hyperlipidemia 1992    Hypertension     Hypocalcemia     Hyponatremia     Kidney stone     Migraine headache     Osteoporosis     Pressure ulcer of unspecified site, unspecified stage     Psoriatic arthritis 1992    Recurrent upper respiratory infection (URI)     Right knee pain     post knee replacement surgery (possible rejectiion of metal)    RLS (restless legs syndrome)     Schizophrenia 1992    stable on meds    Sciatica     Squamous cell carcinoma of skin     Urinary tract infection         Surgical History  Past Surgical History:   Procedure Laterality Date    brow ptosis repair Right 2019    Surgeon: Dr. Karla Henson    CATARACT EXTRACTION       SECTION      COLONOSCOPY N/A 2016    Procedure: COLONOSCOPY;  Surgeon: ANDRIA Connelly MD;  Location: [facility] ([address] [place] [address]);  Service:  Endoscopy;  Laterality: N/A;    COLONOSCOPY N/A 12/09/2022    Procedure: COLONOSCOPY;  Surgeon: Mikey Walters MD;  Location: [facility] ENDO;  Service: Endoscopy;  Laterality: N/A;    cyst removed from right sinus  07/09/1982    EPIDURAL STEROID INJECTION INTO CERVICAL SPINE N/A 12/08/2023    Procedure: C6-7 SOPHY;  Surgeon: Spring Jensen MD;  Location: [facility] PAIN MANAGEMENT;  Service: Pain Management;  Laterality: N/A;  20 mins    EPIDURAL STEROID INJECTION INTO LUMBAR SPINE N/A 08/02/2023    Procedure: Injection-steroid-epidural-lumbar L5-S1;  Surgeon: Chirag Kim MD;  Location: [facility] PAIN [facility]NAGEMENT;  Service: Pain Management;  Laterality: N/A;  asa    ESOPHAGOGASTRODUODENOSCOPY N/A 02/07/2023    Procedure: EGD (ESOPHAGOGASTRODUODENOSCOPY);  Surgeon: Dougie Shea MD;  Location: [facility] ENDO;  Service: Endoscopy;  Laterality: N/A;    FOOT FRACTURE SURGERY      HYSTERECTOMY      VAGINAL HYSTERECTOMY WITHOUT BSO - ENDOMETRIOSIS    INJECTION FOR SENTINEL NODE IDENTIFICATION Right 05/09/2022    Procedure: INJECTION, FOR SENTINEL NODE IDENTIFICATION-Right;  Surgeon: NEAL Dhillon MD;  Location: [facility] OR;  Service: General;  Laterality: Right;    INJECTION OF ANESTHETIC AGENT AROUND MEDIAL BRANCH NERVES INNERVATING LUMBAR FACET JOINT N/A 04/11/2019    Procedure: Lumbo-sacral Block, DR5 and Lateral Branches of S1,S2, S3;  Surgeon: Michelle Pineda Jr., MD;  Location: [facility] PAIN MGT;  Service: Pain Management;  Laterality: N/A;  Pt takes  and states she holds ASA on her own whenever she has procedures.  Instructed to hold x 3 days prior to procedure.      INJECTION OF ANESTHETIC AGENT AROUND MEDIAL BRANCH NERVES INNERVATING LUMBAR FACET JOINT Bilateral 05/03/2023    Procedure: Block-nerve-medial branch-lumbar bilateral L3, L4, L5;  Surgeon: Maile Storm DO;  Location: [facility] PAIN MGT;  Service: Pain Management;  Laterality: Bilateral;  asa    INJECTION OF ANESTHETIC AGENT INTO SACROILIAC JOINT Right 08/30/2018     Procedure: BLOCK, SACROILIAC JOINT-Right- ORAL SEDATION;  Surgeon: Michelle Pineda Jr., MD;  Location: [facility] PAIN MGT;  Service: Pain Management;  Laterality: Right;    INJECTION OF ANESTHETIC AGENT INTO SACROILIAC JOINT Bilateral 09/27/2018    Procedure: BLOCK, SACROILIAC JOINT-BILATERAL;  Surgeon: Michelle Pineda Jr., MD;  Location: [facility] PAIN MGT;  Service: Pain Management;  Laterality: Bilateral;  Please keep at 10:00 due to trasnsportation    INJECTION OF ANESTHETIC AGENT INTO SACROILIAC JOINT Bilateral 02/07/2019    Procedure: Bilateral Sacroiliac Joint Injection - Per Dr Pineda, not necessary to hold ASA.;  Surgeon: Michelle Pineda Jr., MD;  Location: [facility] PAIN MGT;  Service: Pain Management;  Laterality: Bilateral;    INTRAOCULAR PROSTHESES INSERTION Right 08/01/2019    Procedure: INSERTION, IOL PROSTHESIS;  Surgeon: Karen Song MD;  Location: [address];  Service: Ophthalmology;  Laterality: Right;    INTRAOCULAR PROSTHESES INSERTION Left 09/26/2019    Procedure: INSERTION, IOL PROSTHESIS;  Surgeon: Karen Song MD;  Location: [address];  Service: Ophthalmology;  Laterality: Left;    JOINT REPLACEMENT Right     knee    KNEE SURGERY      MASTECTOMY Right 05/09/2022    Procedure: MASTECTOMY-Right;  Surgeon: NEAL Dhillon MD;  Location: [facility];  Service: General;  Laterality: Right;  2.5 HOURS    PHACOEMULSIFICATION OF CATARACT Right 08/01/2019    Procedure: PHACOEMULSIFICATION, CATARACT;  Surgeon: Karen Song MD;  Location: [address];  Service: Ophthalmology;  Laterality: Right;    PHACOEMULSIFICATION OF CATARACT Left 09/26/2019    Procedure: PHACOEMULSIFICATION, CATARACT;  Surgeon: Karen Song MD;  Location: [address];  Service: Ophthalmology;  Laterality: Left;    RADIOFREQUENCY ABLATION, NERVE, PERIPHERAL Right 04/24/2024    Procedure: RIGHT L5 Dorsal Ramus and RIGHT Lateral Branches of S1, S2, and S3;  Surgeon: Maile Storm DO;  Location: [facility]  PAIN MANAGEMENT;  Service: Pain Management;  Laterality: Right;  30 mins    RADIOFREQUENCY THERMOCOAGULATION Right 05/07/2019    Procedure: RADIOFREQUENCY THERMAL COAGULATION RIGHT DORSAL RAMUS 5 AND LATERAL BRANCH OF S1, S2 AND S3;  Surgeon: Michelle Pineda Jr., MD;  Location: [facility];  Service: Pain Management;  Laterality: Right;    RADIOFREQUENCY THERMOCOAGULATION Left 05/14/2019    Procedure: RADIOFREQUENCY THERMAL COAGULATION LEFT DORSAL RAMUS 5 AND LATERAL BRANCH OF S1,S2 AND S3;  Surgeon: Michelle Pineda Jr., MD;  Location: [facility];  Service: Pain Management;  Laterality: Left;    RADIOFREQUENCY THERMOCOAGULATION Right 10/22/2019    Procedure: RADIOFREQUENCY THERMAL COAGULATION---DARSAL RAMUS 5 and LATERAL S1,S2,and S3 Right;  Surgeon: Michelle Pineda Jr., MD;  Location: [facility];  Service: Pain Management;  Laterality: Right;  patient to sign consent DOS    RADIOFREQUENCY THERMOCOAGULATION Left 10/29/2019    Procedure: RADIOFREQUENCY THERMAL COAGULATION - LEFT - DR5, S1,S2, AND S3;  Surgeon: Michelle Pineda Jr., MD;  Location: [facility];  Service: Pain Management;  Laterality: Left;    RADIOFREQUENCY THERMOCOAGULATION Left 05/26/2020    Procedure: RADIOFREQUENCY THERMAL COAGULATION--Left DR5+ lateral branches of S1, S2, S3;  Surgeon: Michelle Pineda Jr., MD;  Location: [facility];  Service: Pain Management;  Laterality: Left;    RADIOFREQUENCY THERMOCOAGULATION Right 06/02/2020    Procedure: RADIOFREQUENCY THERMAL COAGULATION--Right DR5+ lateral branches of S1, S2, S3;  Surgeon: Michelle Pineda Jr., MD;  Location: [facility];  Service: Pain Management;  Laterality: Right;    SENTINEL LYMPH NODE BIOPSY Right 05/09/2022    Procedure: BIOPSY, LYMPH NODE, SENTINEL-Right;  Surgeon: NEAL Dhillon MD;  Location: [facility];  Service: General;  Laterality: Right;    SINUS SURGERY      Surgery on right knee  07/09/1982    TONSILLECTOMY      tumor removed from back left side  upper shoulder  03/17/2006        Physical Exam  There were no vitals filed for this visit. - Virtual Visit     Virtual Visit Physical Exam - 05/02/2024  GEN: No acute distress. Calm, comfortable  HENT: Normocephalic, atraumatic, moist mucous membranes  EYE: Anicteric sclera, non-injected  CV: Non-diaphoretic.  CHEST: Breathing comfortably. Chest expansion symmetric  EXT: No clubbing, cyanosis.   Psych: Mood and affect are appropriate  GAIT: Independent, normal ambulation        Ortho/SPM Exam  PHYSICAL EXAMINATION Prior:    GENERAL: Well appearing, in no acute distress, alert and oriented x3.  PSYCH:  Mood and affect appropriate.  SKIN: Skin color, texture, turgor normal, no rashes or lesions.  HEAD/FACE:  Normocephalic, atraumatic. Cranial nerves grossly intact.  NECK: Normal ROM. Supple.   CV: RRR with palpation of the radial artery.  PULM: No evidence of respiratory difficulty, symmetric chest rise.  GI:  Soft and non-distended.  MSK: Straight leg raising is  negative to radicular pain. There is pain to palpation over the facet joints of the lumbar spine. There is pain with lumbar facet loading. There is pain over the SI joints. +HELIO. +[place]len. +Sacral Thurst. Normal range of motion without pain reproduction with lumbar flexion. Pain with extension.  Peripheral joint ROM is full and pain free without obvious instability or laxity in all four extremities. No deformities, edema, or skin discoloration.  No atrophy or tone abnormalities are noted.   NEURO: Bilateral upper and lower extremity coordination and strength is symmetric.  No loss of sensation is noted.  MENTAL STATUS: A x O x 3, good concentration, speech is fluent and goal directed  MOTOR: 5/5 in all muscle groups  GAIT: normal.  Ambulates unassisted.    Imaging  MRI LUMBAR SPINE WITHOUT CONTRAST     CLINICAL HISTORY:  low back pain; Sacrococcygeal disorders, not elsewhere classified     TECHNIQUE:  Multiplanar, multisequence MR images were acquired  from the thoracolumbar junction to the sacrum without contrast.     COMPARISON:  03/06/2023     FINDINGS:  Alignment: Normal.     Vertebrae: Degenerative endplate changes throughout the lumbar spine.     Discs: Moderate to severe disc height loss throughout the lumbar spine.  No evidence for discitis.     Cord: Conus terminates at L2 and appears unremarkable.  Probable thin fatty filum terminale noted.     Degenerative findings:     T12-L1: Small central protrusion.  No spinal canal stenosis or neural foraminal narrowing.     L1-L2: Circumferential disc bulge with right paracentral/foraminal protrusion result in mild effacement of the thecal sac and moderate right neural foraminal narrowing.     L2-L3: Circumferential disc bulge.  No spinal canal stenosis or neural foraminal narrowing.     L3-L4: Circumferential disc bulge with left paracentral disc extrusion and moderate facet arthropathy result in effacement of the left lateral recess and severe left neural foraminal narrowing.     L4-L5: Circumferential disc bulge and mild facet arthropathy result in mild effacement of the thecal sac and moderate left, mild right neural foraminal narrowing.     L5-S1: Circumferential disc bulge and moderate facet arthropathy result in mild effacement of the thecal sac and severe right, moderate left neural foraminal narrowing.     Paraspinal muscles & soft tissues: Mild paraspinal muscle atrophy.  Questionable gallbladder wall thickening and small stones.     Impression:     1. Multilevel degenerative changes of the lumbar spine detailed above.  Moderate to severe neural foraminal narrowing at L1-L2 and L3-S1.  2. Questionable gallbladder wall thickening and small stones.  Recommend further evaluation with abdominal ultrasound.        Electronically signed by: Akash Black MD  Date:                                            03/12/2023      Labs:  BMP  Lab Results   Component Value Date     03/11/2024    K 3.9 03/11/2024      03/11/2024    CO2 29 03/11/2024    BUN 5 (L) 03/11/2024    CREATININE 0.8 03/11/2024    CALCIUM 9.1 03/11/2024    ANIONGAP 6 (L) 03/11/2024    EGFRNORACEVR >60.0 03/11/2024     Lab Results   Component Value Date    ALT 16 02/07/2024    AST 22 02/07/2024    GGT 91 (H) 07/28/2014    ALKPHOS 69 02/07/2024    BILITOT 0.4 02/07/2024       Assessment:  Problem List Items Addressed This Visit          Neuro    Lumbar spondylosis     Other Visit Diagnoses       Lumbar radiculopathy    -  Primary    Relevant Orders    Procedure Order to Pain Management    DDD (degenerative disc disease), lumbar        Relevant Orders    Procedure Order to Pain Management    Spinal stenosis of lumbar region with neurogenic claudication        Relevant Orders    Procedure Order to Pain Management                03/14/2023 -Bhavnaoumou Landry is a 68 y.o. 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, estrogen receptor positive (04/13/2022), Cataract, Depression, Dry eyes, Dry mouth, Duane's syndrome of right eye, Early dry stage nonexudative age-related macular degeneration of both eyes (09/20/2022), Fibromyalgia (04/17/2014), Fibromyalgia, Fractured hip, GERD (gastroesophageal reflux disease), History of psychiatric hospitalization, Hyperlipidemia (02/02/1992), Hypertension, Hypocalcemia, Hyponatremia, Kidney stone, Migraine headache, Osteoporosis, Pressure ulcer of unspecified site, unspecified stage, Psoriatic arthritis (02/02/1992), Recurrent upper respiratory infection (URI), Right knee pain, RLS (restless legs syndrome), Schizophrenia (02/02/1992), Sciatica, Squamous cell carcinoma of skin, and Urinary tract infection.  By history and examination this patient has chronic low back pain without radiculopathy.  The underlying cause cause is facet arthritis and deconditioning.  Pathology is confirmed  by imaging.  We discussed the underlying diagnoses and multiple treatment options including non-opioid medications, interventional procedures, physical therapy, and home exercise.  The risks and benefits of each treatment option were discussed and all questions were answered.      5/22/2023- 68 y/o female with a Hx of chronic low back  and bilateral leg pain she is s/p a diagnosis lumbar MBB targeting L4/5 and L5/S1 reporting 0% relief of her symptoms. Her Lumbar MRI multilevel disc bulges starting   at L3 through S1.  She has severe right, moderate left neural foraminal narrowing at L5-S1 thta may be causing her Low back and leg pain.  Her pain was refractory to diagnositic low lumbar MBB so i will attempt a L4-5 Lumbar SOPHY.     04/02/2024 Bhavna Landry presents for follow up of her low back/SI joint pain.  Patient has previously undergone bilateral L5 Dorsal Ramus and Lateral Branches of S1, S2, and S3 RFA with significant and lasting relief.  Prior RFA lasted about 2 years.  She would like to repeat the procedure as the pain has now returned.    05/02/2024 - Patient presents virtually today for follow up of her low back pain.  Today her biggest complaint is radicular symptoms primarily on the right.  Patient has a history of lumbar radiculopathy and previously underwent an L5/S1 epidural steroid injection in 08/2023 with excellent relief.  Her pain has returned and she would like to repeat the epidural steroid injection.  She would like to hold off on the left-sided sacral RFA for now until we can address her radicular symptoms as they are more severe.  Discussed with the patient that she may benefit from a surgical consult if epidural does not provide persistent relief.      Plan & Recommendations  Procedures:  Scheduled for repeat L5/S1 interlaminar epidural steroid injection.  Plan to reschedule left L5 Dorsal Ramus and Lateral Branches of S1, S2, and S3 RFA (right-sided has already been  completed.)  Medications: Continue medications as previously prescribed.  Continue gabapentin per Psychiatry.  Imaging: reviewed and discussed in detail  PT/OT/HEP: I encouraged the patient to maintain a home exercise regimen that includes daily, moderate cardiovascular exercise lasting at least 30 minutes.  This may include yoga, gela chi, walking, swimming, aqua aerobics, or other exercises that maintain a heart rate of 50-70% of the calculated maximum heart rate.  I also encouraged light, daily stretching focused on the target area.  Follow Up: RTC 2 weeks post procedure    Maile Storm DO   [facility]      Disclaimer: This note was partly generated using dictation software which may occasionally result in transcription errors.

## 2024-05-02 NOTE — TELEPHONE ENCOUNTER
----- Message from Nani Storm DO sent at 2024  8:56 AM CDT -----  Regarding: Order for PHUONG HAZEL    Patient Name: PHUONG HAZEL([id number])  Sex: Female  : 1955      PCP: MILLIE TELLO    Center: [facility]ENTRAL BILLING OFFICE     Types of orders made on 2024: Procedure Request    Order Date:2024  Ordering User:NANI STORM [[id number]]  Encounter Provider:Nani Storm DO [[id number]]  Authorizing Pro  vider: Nani Storm DO [[id number]]  Department:[facility] PAIN MANAGEMENT[[id number]]    Common Order Information  Procedure -> Epidural Injection (specify level) Cmt: L5/S1 Interlaminar SOPHY    Pre-op Diagnosis -> Lumbar radiculopathy       -> DDD (degenerative disc disease), lumbar       -> Lumbar stenosis with neurogenic claudication     Order Specific Information  Order: Procedure Order to Pain Management [Custom: [id number]]  Order   #:          [id number]: 1 FUTURE    Priority: Routine  Class: Clinic Performed    Future Order Information      Expires on:2025            Expected by:2024                   Associated Diagnoses      M54.16 Lumbar radiculopathy      M51.36 DDD (degenerative disc disease), lumbar      M48.062 Spinal stenosis of lumbar region with neurogenic claudication      Physician -> Emeterio         Facility   Name: -> [facility]           Priority: Routine  Class: Clinic Performed    Future Order Information      Expires on:2025            Expected by:2024                   Associated Diagnoses      M54.16 Lumbar radiculopathy      M51.36 DDD (degenerative disc disease), lumbar      M48.062 Spinal stenosis of lumbar region with neurogenic claudication      Procedure -> Epidural Injection (specify level) Cmt:   L5/S1 Interlaminar SOPHY        Physician -> Emeterio         Pre-op Diagnosis -> Lumbar radiculopathy           -> DDD (degenerative disc disease), lumbar           -> Lumbar stenosis with neurogenic claudication         Facility Name: ->  [facility]

## 2024-05-02 NOTE — PROGRESS NOTES
[facility] -  Established Patient Evaluation    Telemedicine Encounter    Telemedicine Bundle:  This visit was completed during the Coronavirus Crisis to enhance patient safety.  The patient location is: patient's home  The chief complaint leading to consultation is: No chief complaint on file.  Visit type: Virtual visit with synchronous audio and video  Total time spent with patient: 15 mins  Each patient to whom he or she provides medical services by telemedicine is:    (1) informed of the relationship between the physician and patient and the respective role of any other health care provider with respect to management of the patient  (2) notified that he or she may decline to receive medical services by telemedicine and may withdraw from such care at any time.    Chief Complaint  No chief complaint on file.          4/2/2024     1:05 PM 5/22/2023     1:55 PM 3/14/2023     2:10 PM   Last 3 PDI Scores   Pain Disability Index (PDI) 36 11 22     Interval History 05/02/2024  Bhavna Landry presents today virtually for follow up her pain back.  She recently underwent right sided sacral lateral branch RFA and was scheduled to have left sided RFA performed, but her sciatica pain has flared up in the interim.  She would like to hold off on the left sacral lateral branch RFA as her right-sided radicular symptoms are more severe.  Pain radiates down the right lower extremity to the top of the foot.  She needs to use a walker to ambulate due to pain.  She has a history of lumbar radiculopathy and underwent L5/S1 interlaminar SOPHY in 08/2023 with excellent results.  She reports her current radicular symptoms are the same as prior.  She would like to repeat the lumbar epidural steroid injection.  She is currently taking gabapentin which is prescribed by her psychiatrist.  She denies any weakness in the lower extremities, saddle anesthesia, or loss of bowel or bladder function.  She currently rates  "her pain 8/10.       Interval History 04/02/2024:  Bhavna Landry presents today for follow up of her low back pain.  Her low back/sacral pain has returned.  In the past she has responded well to bilateral L5 Dorsal Ramus and Lateral Branches of S1, S2, and S3 RFA.  This is previously given her significant relief lasting several years.  She would like to repeat this procedure.  Denies any recent falls or trauma.  No changes to her pain.  Denies any radicular symptoms.  No weakness, saddle anesthesia, bowel or bladder changes.     Interval History (05/22/2023):  Bhavna Landry returns today for follow up she is s/p a a diagnostic Lumbar MBB targeting L4/5 and L5/S1 that  provided 0% relief of her low back and leg pain R>L. She would like to consider other injection that may help. She denies and new pain, denies B/B dysfunction, denies any profound weakness.     Current Pain Scales:  Current: 5/10              Typical Range: 5-9/10     Interval History (03/14/2023):  Bhavna Landry returns today for low back pain that radiates into the b/l thighs.  Pain is described as clenching pain that has worsened over last 2 years. No trauma or surgery.  Pain is worse with extension.  Pain is better with heat, lying down, gabapentin, tylenol and tramadol.  She is scheduled to start PT next week.  Currently, the bilateral hip and bilateral leg pain is stable.  Avoid NSAIDs due to history of gastric ulcer.     Current Pain Scales:  Current: 7/10              Typical Range: 7-8/10     Background from Chart Review  9/16/2020- Mrs. Landry presents to clinic today reporting left hip pain for the past 3-4 mos, which has not improved with PT.  Pain starts in the lateral left hip "on the bone" and radiates through the buttock, down the lateral thigh not passing the knee, and into the anterolateral thigh.  She endorses regular stretching and states that this pain is distinctly different from the SI pain for which she received SI RFA in the " past.     6/09/2020-   64-year-old female presents status post left then right  DR 5+ lateral branches of the S1, S2 and S3 RFA with reported 100% continued  relief she reports that her pain score today is 0/10.  She reports improvement with her ADLs and overall improvement of her functionality.     05/05/2020  presents tele-medicine appointment for a follow-up appointment for low back, left groin and bilateral hip pain.  Since the last visit, Bhavna Landry states the pain has been persistant. Current pain intensity is 9/10.  Patient reports onset of pain 2 weeks, patient states bending forward and sitting for long periods of time increase her pain.  Pain is described as aching.  She reports no radicular symptoms in either leg.  Worst pain is not out of 10.  Patient is status post right and left L5 Dorsal Ramus and Lateral Branches of S1, S2, and S3 (4 levels) RFA reported 80-90% relief for a minimal 6 months.  Pain is now returned and patient like to reschedule procedure.        Treatment History  PT/OT: yes  HEP: yes  TENS:    Injections:   Bilateral SI joint injections, bilateral  Dorsal ramus block(DR5, S1,S2,S3),  bilateral L5 Dorsal Ramus and Lateral Branches of S1, S2, and S3 (4 levels) RFA, GTB injections   12/08/2023 - Cervical Interlaminar Epidural Steroid Injection C6/C7 under Fluoroscopic Guidance   08//02/2023 -  Lumbar Interlaminar Epidural Steroid Injection L5/S1   05/03/2023 -  Diagnostic Bilateral L3, L4, and L5 Lumbar Medial Branch Block under Fluoroscopy  - No relief    Surgery:  Medications:   - NSAIDS: avoid due to gastric ulcer   - MSK Relaxants:   - TCAs:   - SNRIs: Venlafaxine  - Topicals: Voltaren  - Opioids: Tramadol   - Anticonvulsants: Gabapentin    Current Pain Medications  Gabapentin  Tramadol      Full Medication List    Current Outpatient Medications:     albuterol (PROVENTIL) 2.5 mg /3 mL (0.083 %) nebulizer solution, Take 3 mLs (2.5 mg total) by nebulization every 6 (six) hours as  needed for Wheezing. Rescue, Disp: 90 each, Rfl: 2    albuterol (PROVENTIL/VENTOLIN HFA) 90 mcg/actuation inhaler, USE 2 INHALATIONS BY MOUTH 4  TIMES DAILY AS NEEDED, Disp: 34 g, Rfl: 3    amLODIPine (NORVASC) 5 MG tablet, Take 1 tablet (5 mg total) by mouth 2 (two) times a day., Disp: 180 tablet, Rfl: 1    ascorbic acid, vitamin C, (VITAMIN C) 500 MG tablet, Take 500 mg by mouth once daily., Disp: , Rfl:     aspirin (ECOTRIN) 81 MG EC tablet, Take 81 mg by mouth once daily., Disp: , Rfl:     atorvastatin (LIPITOR) 80 MG tablet, TAKE 1 TABLET(80 MG) BY MOUTH EVERY DAY, Disp: 90 tablet, Rfl: 0    baclofen (LIORESAL) 10 MG tablet, 1 tab po tid for muscle cramps., Disp: 60 tablet, Rfl: 1    benztropine (COGENTIN) 0.5 MG tablet, TAKE 1 TABLET(0.5 MG) BY MOUTH TWICE DAILY, Disp: 180 tablet, Rfl: 3    budesonide-formoterol 80-4.5 mcg (SYMBICORT) 80-4.5 mcg/actuation HFAA, Inhale 2 puffs into the lungs 2 (two) times a day., Disp: 6.9 g, Rfl: 11    ciclopirox (PENLAC) 8 % Soln, Apply topically nightly. Paint on affected nail(s) once per night, remove residue once per week with alcohol, Disp: 6.6 mL, Rfl: 3    cloNIDine (CATAPRES) 0.1 MG tablet, 1 tab po q day for systolic BP > 160 or DBP >100., Disp: 30 tablet, Rfl: 1    exemestane (AROMASIN) 25 mg tablet, Take 1 tablet (25 mg total) by mouth once daily., Disp: 30 tablet, Rfl: 11    fluticasone (VERAMYST) 27.5 mcg/actuation nasal spray, 2 sprays by Nasal route as needed for Rhinitis., Disp: , Rfl:     fluticasone propionate (FLONASE) 50 mcg/actuation nasal spray, 1 spray by Each Nostril route once daily., Disp: , Rfl:     folic acid (FOLVITE) 1 MG tablet, Take 1 tablet (1,000 mcg total) by mouth once daily., Disp: 90 tablet, Rfl: 3    gabapentin (NEURONTIN) 100 MG capsule, TAKE 1 CAPSULE(100 MG) BY MOUTH TWICE DAILY AS NEEDED FOR ANXIETY, Disp: 180 capsule, Rfl: 3    HYDROcodone-acetaminophen (NORCO) 5-325 mg per tablet, Take 1 tablet by mouth every 8 (eight) hours as  needed for Pain., Disp: 30 tablet, Rfl: 0    isosorbide mononitrate (IMDUR) 30 MG 24 hr tablet, Take 1 tablet (30 mg total) by mouth once daily., Disp: 30 tablet, Rfl: 3    losartan (COZAAR) 25 MG tablet, Take 1 tablet (25 mg total) by mouth daily as needed (when SBP above 150)., Disp: 30 tablet, Rfl: 0    methotrexate 2.5 MG Tab, Take 8 tablets (20 mg total) by mouth every 7 days. Take 4 tabs Mon am and 4 tabs Mon pm only, Disp: 96 tablet, Rfl: 0    omega-3 fatty acids/fish oil (FISH OIL-OMEGA-3 FATTY ACIDS) 300-1,000 mg capsule, Take by mouth once daily., Disp: , Rfl:     omeprazole (PRILOSEC) 40 MG capsule, Take 1 capsule (40 mg total) by mouth every morning., Disp: 90 capsule, Rfl: 3    risperiDONE (RISPERDAL) 2 MG tablet, TAKE 1 TABLET(2 MG) BY MOUTH EVERY MORNING, Disp: 90 tablet, Rfl: 3    risperiDONE (RISPERDAL) 4 MG tablet, TAKE 1 TABLET(4 MG) BY MOUTH EVERY EVENING, Disp: 90 tablet, Rfl: 3    venlafaxine (EFFEXOR-XR) 75 MG 24 hr capsule, Take 1 capsule (75 mg total) by mouth once daily., Disp: 90 capsule, Rfl: 3    vitamin D (VITAMIN D3) 1000 units Tab, Take 1,000 Units by mouth once daily., Disp: , Rfl:     vitamin E 200 UNIT capsule, Take 200 Units by mouth once daily., Disp: , Rfl:     zinc gluconate 50 mg tablet, Take 50 mg by mouth once daily., Disp: , Rfl:   No current facility-administered medications for this visit.    Facility-Administered Medications Ordered in Other Visits:     tropicamide 1% ophthalmic solution 1 drop, 1 drop, Right Eye, On Call Procedure, Karen Song MD, 1 drop at 08/01/19 0648     Review of Systems  ROS  REVIEW OF SYSTEMS:    GENERAL:  No weight loss, malaise or fevers.  HEENT:   No recent changes in vision or hearing  NECK:  No difficulty with swallowing. No stridor.   RESPIRATORY:  Negative for cough, wheezing or shortness of breath, patient denies any recent URI.  CARDIOVASCULAR:  Negative for chest pain, leg swelling or palpitations. +HTN  GI:  Negative for  abdominal discomfort, blood in stools or black stools or change in bowel habits.  MUSCULOSKELETAL:  See HPI.  SKIN:  Negative for lesions, rash, and itching.  PSYCH:  No mood disorder or recent psychosocial stressors.   +anxiety +depression   HEMATOLOGY/LYMPHOLOGY:  Negative for prolonged bleeding, bruising easily or swollen nodes.  Patient is not currently taking any anti-coagulants  NEURO:   No history of headaches, syncope, paralysis, seizures or tremors.  All other reviewed and negative other than HPI.      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, estrogen receptor positive 2022    Cataract     Depression     Dry eyes     Dry mouth     Duane's syndrome of right eye     Early dry stage nonexudative age-related macular degeneration of both eyes 2022    Fibromyalgia 2014    Fibromyalgia     Fractured hip     RIGHT HIP    GERD (gastroesophageal reflux disease)     History of psychiatric hospitalization     Hyperlipidemia 1992    Hypertension     Hypocalcemia     Hyponatremia     Kidney stone     Migraine headache     Osteoporosis     Pressure ulcer of unspecified site, unspecified stage     Psoriatic arthritis 1992    Recurrent upper respiratory infection (URI)     Right knee pain     post knee replacement surgery (possible rejectiion of metal)    RLS (restless legs syndrome)     Schizophrenia 1992    stable on meds    Sciatica     Squamous cell carcinoma of skin     Urinary tract infection         Surgical History  Past Surgical History:   Procedure Laterality Date    brow ptosis repair Right 2019    Surgeon: Dr. Karla Henson    CATARACT EXTRACTION       SECTION      COLONOSCOPY N/A 2016    Procedure: COLONOSCOPY;  Surgeon: ANDRIA Connelly MD;  Location: [facility] ([address] [place] [address]);  Service:  Endoscopy;  Laterality: N/A;    COLONOSCOPY N/A 12/09/2022    Procedure: COLONOSCOPY;  Surgeon: Mikey Walters MD;  Location: [facility] ENDO;  Service: Endoscopy;  Laterality: N/A;    cyst removed from right sinus  07/09/1982    EPIDURAL STEROID INJECTION INTO CERVICAL SPINE N/A 12/08/2023    Procedure: C6-7 SOPHY;  Surgeon: Spring Jensen MD;  Location: [facility];  Service: Pain Management;  Laterality: N/A;  20 mins    EPIDURAL STEROID INJECTION INTO LUMBAR SPINE N/A 08/02/2023    Procedure: Injection-steroid-epidural-lumbar L5-S1;  Surgeon: Chirag Kim MD;  Location: [facility];  Service: Pain Management;  Laterality: N/A;  asa    ESOPHAGOGASTRODUODENOSCOPY N/A 02/07/2023    Procedure: EGD (ESOPHAGOGASTRODUODENOSCOPY);  Surgeon: Dougie Shea MD;  Location: [facility] ENDO;  Service: Endoscopy;  Laterality: N/A;    FOOT FRACTURE SURGERY      HYSTERECTOMY      VAGINAL HYSTERECTOMY WITHOUT BSO - ENDOMETRIOSIS    INJECTION FOR SENTINEL NODE IDENTIFICATION Right 05/09/2022    Procedure: INJECTION, FOR SENTINEL NODE IDENTIFICATION-Right;  Surgeon: NEAL Dhillon MD;  Location: [facility] OR;  Service: General;  Laterality: Right;    INJECTION OF ANESTHETIC AGENT AROUND MEDIAL BRANCH NERVES INNERVATING LUMBAR FACET JOINT N/A 04/11/2019    Procedure: Lumbo-sacral Block, DR5 and Lateral Branches of S1,S2, S3;  Surgeon: Michelle Pineda Jr., MD;  Location: [facility] PAIN [facility]GT;  Service: Pain Management;  Laterality: N/A;  Pt takes  and states she holds ASA on her own whenever she has procedures.  Instructed to hold x 3 days prior to procedure.      INJECTION OF ANESTHETIC AGENT AROUND MEDIAL BRANCH NERVES INNERVATING LUMBAR FACET JOINT Bilateral 05/03/2023    Procedure: Block-nerve-medial branch-lumbar bilateral L3, L4, L5;  Surgeon: Maile Storm DO;  Location: [facility] PAIN [facility]GT;  Service: Pain Management;  Laterality: Bilateral;  asa    INJECTION OF ANESTHETIC AGENT INTO SACROILIAC JOINT Right 08/30/2018     Procedure: BLOCK, SACROILIAC JOINT-Right- ORAL SEDATION;  Surgeon: Michelle Pineda Jr., MD;  Location: [facility] PAIN MGT;  Service: Pain Management;  Laterality: Right;    INJECTION OF ANESTHETIC AGENT INTO SACROILIAC JOINT Bilateral 09/27/2018    Procedure: BLOCK, SACROILIAC JOINT-BILATERAL;  Surgeon: Michelle Pineda Jr., MD;  Location: [facility] PAIN MGT;  Service: Pain Management;  Laterality: Bilateral;  Please keep at 10:00 due to trasnsportation    INJECTION OF ANESTHETIC AGENT INTO SACROILIAC JOINT Bilateral 02/07/2019    Procedure: Bilateral Sacroiliac Joint Injection - Per Dr Pineda, not necessary to hold ASA.;  Surgeon: Michelle Pineda Jr., MD;  Location: [facility] PAIN [facility]GT;  Service: Pain Management;  Laterality: Bilateral;    INTRAOCULAR PROSTHESES INSERTION Right 08/01/2019    Procedure: INSERTION, IOL PROSTHESIS;  Surgeon: Karen Song MD;  Location: [address];  Service: Ophthalmology;  Laterality: Right;    INTRAOCULAR PROSTHESES INSERTION Left 09/26/2019    Procedure: INSERTION, IOL PROSTHESIS;  Surgeon: Karen Song MD;  Location: [address];  Service: Ophthalmology;  Laterality: Left;    JOINT REPLACEMENT Right     knee    KNEE SURGERY      MASTECTOMY Right 05/09/2022    Procedure: MASTECTOMY-Right;  Surgeon: NEAL Dhillon MD;  Location: [facility];  Service: General;  Laterality: Right;  2.5 HOURS    PHACOEMULSIFICATION OF CATARACT Right 08/01/2019    Procedure: PHACOEMULSIFICATION, CATARACT;  Surgeon: Karen Song MD;  Location: [address];  Service: Ophthalmology;  Laterality: Right;    PHACOEMULSIFICATION OF CATARACT Left 09/26/2019    Procedure: PHACOEMULSIFICATION, CATARACT;  Surgeon: Karen Song MD;  Location: [address];  Service: Ophthalmology;  Laterality: Left;    RADIOFREQUENCY ABLATION, NERVE, PERIPHERAL Right 04/24/2024    Procedure: RIGHT L5 Dorsal Ramus and RIGHT Lateral Branches of S1, S2, and S3;  Surgeon: Maile Storm DO;  Location: [facility]  PAIN MANAGEMENT;  Service: Pain Management;  Laterality: Right;  30 mins    RADIOFREQUENCY THERMOCOAGULATION Right 05/07/2019    Procedure: RADIOFREQUENCY THERMAL COAGULATION RIGHT DORSAL RAMUS 5 AND LATERAL BRANCH OF S1, S2 AND S3;  Surgeon: Michelle Pineda Jr., MD;  Location: [facility];  Service: Pain Management;  Laterality: Right;    RADIOFREQUENCY THERMOCOAGULATION Left 05/14/2019    Procedure: RADIOFREQUENCY THERMAL COAGULATION LEFT DORSAL RAMUS 5 AND LATERAL BRANCH OF S1,S2 AND S3;  Surgeon: Michelle Pineda Jr., MD;  Location: [facility];  Service: Pain Management;  Laterality: Left;    RADIOFREQUENCY THERMOCOAGULATION Right 10/22/2019    Procedure: RADIOFREQUENCY THERMAL COAGULATION---DARSAL RAMUS 5 and LATERAL S1,S2,and S3 Right;  Surgeon: Michelle Pineda Jr., MD;  Location: [facility];  Service: Pain Management;  Laterality: Right;  patient to sign consent DOS    RADIOFREQUENCY THERMOCOAGULATION Left 10/29/2019    Procedure: RADIOFREQUENCY THERMAL COAGULATION - LEFT - DR5, S1,S2, AND S3;  Surgeon: Michelle Pineda Jr., MD;  Location: [facility];  Service: Pain Management;  Laterality: Left;    RADIOFREQUENCY THERMOCOAGULATION Left 05/26/2020    Procedure: RADIOFREQUENCY THERMAL COAGULATION--Left DR5+ lateral branches of S1, S2, S3;  Surgeon: Michelle Pineda Jr., MD;  Location: [facility];  Service: Pain Management;  Laterality: Left;    RADIOFREQUENCY THERMOCOAGULATION Right 06/02/2020    Procedure: RADIOFREQUENCY THERMAL COAGULATION--Right DR5+ lateral branches of S1, S2, S3;  Surgeon: Michelle Pineda Jr., MD;  Location: [facility];  Service: Pain Management;  Laterality: Right;    SENTINEL LYMPH NODE BIOPSY Right 05/09/2022    Procedure: BIOPSY, LYMPH NODE, SENTINEL-Right;  Surgeon: NEAL Dhillon MD;  Location: [facility];  Service: General;  Laterality: Right;    SINUS SURGERY      Surgery on right knee  07/09/1982    TONSILLECTOMY      tumor removed from back left side  upper shoulder  03/17/2006        Physical Exam  There were no vitals filed for this visit. - Virtual Visit     Virtual Visit Physical Exam - 05/02/2024  GEN: No acute distress. Calm, comfortable  HENT: Normocephalic, atraumatic, moist mucous membranes  EYE: Anicteric sclera, non-injected  CV: Non-diaphoretic.  CHEST: Breathing comfortably. Chest expansion symmetric  EXT: No clubbing, cyanosis.   Psych: Mood and affect are appropriate  GAIT: Independent, normal ambulation        Ortho/SPM Exam  PHYSICAL EXAMINATION Prior:    GENERAL: Well appearing, in no acute distress, alert and oriented x3.  PSYCH:  Mood and affect appropriate.  SKIN: Skin color, texture, turgor normal, no rashes or lesions.  HEAD/FACE:  Normocephalic, atraumatic. Cranial nerves grossly intact.  NECK: Normal ROM. Supple.   CV: RRR with palpation of the radial artery.  PULM: No evidence of respiratory difficulty, symmetric chest rise.  GI:  Soft and non-distended.  MSK: Straight leg raising is  negative to radicular pain. There is pain to palpation over the facet joints of the lumbar spine. There is pain with lumbar facet loading. There is pain over the SI joints. +HELIO. +Ga[place]len. +Sacral Thurst. Normal range of motion without pain reproduction with lumbar flexion. Pain with extension.  Peripheral joint ROM is full and pain free without obvious instability or laxity in all four extremities. No deformities, edema, or skin discoloration.  No atrophy or tone abnormalities are noted.   NEURO: Bilateral upper and lower extremity coordination and strength is symmetric.  No loss of sensation is noted.  MENTAL STATUS: A x O x 3, good concentration, speech is fluent and goal directed  MOTOR: 5/5 in all muscle groups  GAIT: normal.  Ambulates unassisted.    Imaging  MRI LUMBAR SPINE WITHOUT CONTRAST     CLINICAL HISTORY:  low back pain; Sacrococcygeal disorders, not elsewhere classified     TECHNIQUE:  Multiplanar, multisequence MR images were acquired  from the thoracolumbar junction to the sacrum without contrast.     COMPARISON:  03/06/2023     FINDINGS:  Alignment: Normal.     Vertebrae: Degenerative endplate changes throughout the lumbar spine.     Discs: Moderate to severe disc height loss throughout the lumbar spine.  No evidence for discitis.     Cord: Conus terminates at L2 and appears unremarkable.  Probable thin fatty filum terminale noted.     Degenerative findings:     T12-L1: Small central protrusion.  No spinal canal stenosis or neural foraminal narrowing.     L1-L2: Circumferential disc bulge with right paracentral/foraminal protrusion result in mild effacement of the thecal sac and moderate right neural foraminal narrowing.     L2-L3: Circumferential disc bulge.  No spinal canal stenosis or neural foraminal narrowing.     L3-L4: Circumferential disc bulge with left paracentral disc extrusion and moderate facet arthropathy result in effacement of the left lateral recess and severe left neural foraminal narrowing.     L4-L5: Circumferential disc bulge and mild facet arthropathy result in mild effacement of the thecal sac and moderate left, mild right neural foraminal narrowing.     L5-S1: Circumferential disc bulge and moderate facet arthropathy result in mild effacement of the thecal sac and severe right, moderate left neural foraminal narrowing.     Paraspinal muscles & soft tissues: Mild paraspinal muscle atrophy.  Questionable gallbladder wall thickening and small stones.     Impression:     1. Multilevel degenerative changes of the lumbar spine detailed above.  Moderate to severe neural foraminal narrowing at L1-L2 and L3-S1.  2. Questionable gallbladder wall thickening and small stones.  Recommend further evaluation with abdominal ultrasound.        Electronically signed by: Akash Black MD  Date:                                            03/12/2023      Labs:  BMP  Lab Results   Component Value Date     03/11/2024    K 3.9 03/11/2024      03/11/2024    CO2 29 03/11/2024    BUN 5 (L) 03/11/2024    CREATININE 0.8 03/11/2024    CALCIUM 9.1 03/11/2024    ANIONGAP 6 (L) 03/11/2024    EGFRNORACEVR >60.0 03/11/2024     Lab Results   Component Value Date    ALT 16 02/07/2024    AST 22 02/07/2024    GGT 91 (H) 07/28/2014    ALKPHOS 69 02/07/2024    BILITOT 0.4 02/07/2024       Assessment:  Problem List Items Addressed This Visit          Neuro    Lumbar spondylosis     Other Visit Diagnoses       Lumbar radiculopathy    -  Primary    Relevant Orders    Procedure Order to Pain Management    DDD (degenerative disc disease), lumbar        Relevant Orders    Procedure Order to Pain Management    Spinal stenosis of lumbar region with neurogenic claudication        Relevant Orders    Procedure Order to Pain Management                03/14/2023 -Bhavnaoumou Landry is a 68 y.o. 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, estrogen receptor positive (04/13/2022), Cataract, Depression, Dry eyes, Dry mouth, Duane's syndrome of right eye, Early dry stage nonexudative age-related macular degeneration of both eyes (09/20/2022), Fibromyalgia (04/17/2014), Fibromyalgia, Fractured hip, GERD (gastroesophageal reflux disease), History of psychiatric hospitalization, Hyperlipidemia (02/02/1992), Hypertension, Hypocalcemia, Hyponatremia, Kidney stone, Migraine headache, Osteoporosis, Pressure ulcer of unspecified site, unspecified stage, Psoriatic arthritis (02/02/1992), Recurrent upper respiratory infection (URI), Right knee pain, RLS (restless legs syndrome), Schizophrenia (02/02/1992), Sciatica, Squamous cell carcinoma of skin, and Urinary tract infection.  By history and examination this patient has chronic low back pain without radiculopathy.  The underlying cause cause is facet arthritis and deconditioning.  Pathology is confirmed  by imaging.  We discussed the underlying diagnoses and multiple treatment options including non-opioid medications, interventional procedures, physical therapy, and home exercise.  The risks and benefits of each treatment option were discussed and all questions were answered.      5/22/2023- 68 y/o female with a Hx of chronic low back  and bilateral leg pain she is s/p a diagnosis lumbar MBB targeting L4/5 and L5/S1 reporting 0% relief of her symptoms. Her Lumbar MRI multilevel disc bulges starting   at L3 through S1.  She has severe right, moderate left neural foraminal narrowing at L5-S1 thta may be causing her Low back and leg pain.  Her pain was refractory to diagnositic low lumbar MBB so i will attempt a L4-5 Lumbar SOPHY.     04/02/2024 Bhavna Landry presents for follow up of her low back/SI joint pain.  Patient has previously undergone bilateral L5 Dorsal Ramus and Lateral Branches of S1, S2, and S3 RFA with significant and lasting relief.  Prior RFA lasted about 2 years.  She would like to repeat the procedure as the pain has now returned.    05/02/2024 - Patient presents virtually today for follow up of her low back pain.  Today her biggest complaint is radicular symptoms primarily on the right.  Patient has a history of lumbar radiculopathy and previously underwent an L5/S1 epidural steroid injection in 08/2023 with excellent relief.  Her pain has returned and she would like to repeat the epidural steroid injection.  She would like to hold off on the left-sided sacral RFA for now until we can address her radicular symptoms as they are more severe.  Discussed with the patient that she may benefit from a surgical consult if epidural does not provide persistent relief.      Plan & Recommendations  Procedures:  Scheduled for repeat L5/S1 interlaminar epidural steroid injection.  Plan to reschedule left L5 Dorsal Ramus and Lateral Branches of S1, S2, and S3 RFA (right-sided has already been  completed.)  Medications: Continue medications as previously prescribed.  Continue gabapentin per Psychiatry.  Imaging: reviewed and discussed in detail  PT/OT/HEP: I encouraged the patient to maintain a home exercise regimen that includes daily, moderate cardiovascular exercise lasting at least 30 minutes.  This may include yoga, gela chi, walking, swimming, aqua aerobics, or other exercises that maintain a heart rate of 50-70% of the calculated maximum heart rate.  I also encouraged light, daily stretching focused on the target area.  Follow Up: RTC 2 weeks post procedure    Maile Storm DO   [facility]      Disclaimer: This note was partly generated using dictation software which may occasionally result in transcription errors.

## 2024-05-03 ENCOUNTER — HOSPITAL ENCOUNTER (OUTPATIENT)
Dept: PULMONOLOGY | Facility: CLINIC | Age: 69
Discharge: HOME | End: 2024-05-03
Payer: MEDICARE

## 2024-05-03 ENCOUNTER — OFFICE VISIT (OUTPATIENT)
Dept: PULMONOLOGY | Facility: CLINIC | Age: 69
End: 2024-05-03
Payer: MEDICARE

## 2024-05-03 ENCOUNTER — PATIENT MESSAGE (OUTPATIENT)
Dept: PULMONOLOGY | Facility: CLINIC | Age: 69
End: 2024-05-03

## 2024-05-03 VITALS
OXYGEN SATURATION: 99 % | HEIGHT: 62 IN | BODY MASS INDEX: 18.94 KG/M2 | WEIGHT: 102.94 LBS | HEART RATE: 69 BPM | DIASTOLIC BLOOD PRESSURE: 82 MMHG | SYSTOLIC BLOOD PRESSURE: 140 MMHG

## 2024-05-03 DIAGNOSIS — R05.9 COUGH, UNSPECIFIED TYPE: ICD-10-CM

## 2024-05-03 DIAGNOSIS — Z87.891 PERSONAL HISTORY OF NICOTINE DEPENDENCE: ICD-10-CM

## 2024-05-03 DIAGNOSIS — R05.8 UPPER AIRWAY COUGH SYNDROME: Primary | ICD-10-CM

## 2024-05-03 DIAGNOSIS — Z72.0 TOBACCO ABUSE: ICD-10-CM

## 2024-05-03 PROCEDURE — 99204 OFFICE O/P NEW MOD 45 MIN: CPT | Mod: 25,S$GLB,, | Performed by: INTERNAL MEDICINE

## 2024-05-03 PROCEDURE — 3077F SYST BP >= 140 MM HG: CPT | Mod: CPTII,S$GLB,, | Performed by: INTERNAL MEDICINE

## 2024-05-03 PROCEDURE — 1101F PT FALLS ASSESS-DOCD LE1/YR: CPT | Mod: CPTII,S$GLB,, | Performed by: INTERNAL MEDICINE

## 2024-05-03 PROCEDURE — 94729 DIFFUSING CAPACITY: CPT | Mod: S$GLB,,, | Performed by: INTERNAL MEDICINE

## 2024-05-03 PROCEDURE — 3079F DIAST BP 80-89 MM HG: CPT | Mod: CPTII,S$GLB,, | Performed by: INTERNAL MEDICINE

## 2024-05-03 PROCEDURE — 1159F MED LIST DOCD IN RCRD: CPT | Mod: CPTII,S$GLB,, | Performed by: INTERNAL MEDICINE

## 2024-05-03 PROCEDURE — 1126F AMNT PAIN NOTED NONE PRSNT: CPT | Mod: CPTII,S$GLB,, | Performed by: INTERNAL MEDICINE

## 2024-05-03 PROCEDURE — 94726 PLETHYSMOGRAPHY LUNG VOLUMES: CPT | Mod: S$GLB,,, | Performed by: INTERNAL MEDICINE

## 2024-05-03 PROCEDURE — 94010 BREATHING CAPACITY TEST: CPT | Mod: S$GLB,,, | Performed by: INTERNAL MEDICINE

## 2024-05-03 PROCEDURE — 3288F FALL RISK ASSESSMENT DOCD: CPT | Mod: CPTII,S$GLB,, | Performed by: INTERNAL MEDICINE

## 2024-05-03 PROCEDURE — 4010F ACE/ARB THERAPY RXD/TAKEN: CPT | Mod: CPTII,S$GLB,, | Performed by: INTERNAL MEDICINE

## 2024-05-03 PROCEDURE — 3008F BODY MASS INDEX DOCD: CPT | Mod: CPTII,S$GLB,, | Performed by: INTERNAL MEDICINE

## 2024-05-03 PROCEDURE — 99999 PR PBB SHADOW E&M-EST. PATIENT-LVL III: CPT | Mod: PBBFAC,,, | Performed by: STUDENT IN AN ORGANIZED HEALTH CARE EDUCATION/TRAINING PROGRAM

## 2024-05-03 PROCEDURE — 1160F RVW MEDS BY RX/DR IN RCRD: CPT | Mod: CPTII,S$GLB,, | Performed by: INTERNAL MEDICINE

## 2024-05-03 RX ORDER — MONTELUKAST SODIUM 10 MG/1
10 TABLET ORAL NIGHTLY
Qty: 30 TABLET | Refills: 0 | Status: SHIPPED | OUTPATIENT
Start: 2024-05-03 | End: 2024-06-02

## 2024-05-03 NOTE — PROGRESS NOTES
[facility]    SUBJECTIVE:     Chief Complaint: Cough    History of Present Illness:  Bhavna Landry is a 68 y.o. female who presents for initial evaluation of cough. She has not been seen in this clinic before. Her history is significant for rheumatoid arthritis on MTX and ongoing tobacco abuse. She was referred from ENT due to longstanding diagnosis of COPD in chart without formal evaluation.    Today the patient reports she's in her normal state of health. She endorses significant sinus congestion and post nasal drip which is something she's been dealing with for a long time. She de endorses a daily cough productive of white sputum. She denies recent fevers, chills, or dyspnea. She endorses occasional wheeze however it clears with coughing. She uses an albuterol inhaler around 2x per day when she feels her chest congestion is too bad and it helps her cough up more sputum. She has never had PFTs. She continues to smoke. We discussed quitting today.     She is not on antihistamines, she takes flonase consistently.    Smokin years (0.5 packs per day)  Other substances: None  Inhalers: Symbicort, Albuterol  Travel: None  Incarceration/homelessness: None  Occupational/environmental exposures: [employer] , retired for 15 years.  Pets/hobbies: Dog and cat (some worsening of symptoms with dogs shedding)  Recent illness: None  B-Symptoms: Night sweats   Autoimmune symptoms/features: Denies  Family Hx: Father Lung Cancer, smoker      Review of patient's allergies indicates:   Allergen Reactions    Etanercept Other (See Comments)     Other reaction(s): recurrent infections    Chloramphenicol sod succinate Hives    Codeine Other (See Comments)     Other reaction(s): Stomach upset. Pt states OK with Percocet    Nickel sutures [surgical stainless steel] Dermatitis     Allergic contact dermatitis    Adhesive Rash       Past Medical History:   Diagnosis Date    Allergy     Amblyopia     Anemia      Anticoagulant long-term use     Arthritis 1992    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, estrogen receptor positive 2022    Cataract     Depression     Dry eyes     Dry mouth     Duane's syndrome of right eye     Early dry stage nonexudative age-related macular degeneration of both eyes 2022    Fibromyalgia 2014    Fibromyalgia     Fractured hip     RIGHT HIP    GERD (gastroesophageal reflux disease)     History of psychiatric hospitalization     Hyperlipidemia 1992    Hypertension     Hypocalcemia     Hyponatremia     Kidney stone     Migraine headache     Osteoporosis     Pressure ulcer of unspecified site, unspecified stage     Psoriatic arthritis 1992    Recurrent upper respiratory infection (URI)     Right knee pain     post knee replacement surgery (possible rejectiion of metal)    RLS (restless legs syndrome)     Schizophrenia 1992    stable on meds    Sciatica     Squamous cell carcinoma of skin     Urinary tract infection      Past Surgical History:   Procedure Laterality Date    brow ptosis repair Right 2019    Surgeon: Dr. Karla Henson    CATARACT EXTRACTION       SECTION      COLONOSCOPY N/A 2016    Procedure: COLONOSCOPY;  Surgeon: ANDRIA Connelly MD;  Location: [address]);  Service: Endoscopy;  Laterality: N/A;    COLONOSCOPY N/A 2022    Procedure: COLONOSCOPY;  Surgeon: Mikey Walters MD;  Location: [facility];  Service: Endoscopy;  Laterality: N/A;    cyst removed from right sinus  1982    EPIDURAL STEROID INJECTION INTO CERVICAL SPINE N/A 2023    Procedure: C6-7 SOPHY;  Surgeon: Spring Jensen MD;  Location: [facility]IN [facility];  Service: Pain Management;  Laterality: N/A;  20 mins    EPIDURAL STEROID INJECTION INTO LUMBAR SPINE N/A 2023    Procedure: Injection-steroid-epidural-lumbar L5-S1;  Surgeon: Chirag Kim MD;  Location: [facility];  Service: Pain Management;   Laterality: N/A;  asa    ESOPHAGOGASTRODUODENOSCOPY N/A 02/07/2023    Procedure: EGD (ESOPHAGOGASTRODUODENOSCOPY);  Surgeon: Dougie Shea MD;  Location: [facility] ENDO;  Service: Endoscopy;  Laterality: N/A;    FOOT FRACTURE SURGERY      HYSTERECTOMY      VAGINAL HYSTERECTOMY WITHOUT BSO - ENDOMETRIOSIS    INJECTION FOR SENTINEL NODE IDENTIFICATION Right 05/09/2022    Procedure: INJECTION, FOR SENTINEL NODE IDENTIFICATION-Right;  Surgeon: NEAL Dhillon MD;  Location: [facility] OR;  Service: General;  Laterality: Right;    INJECTION OF ANESTHETIC AGENT AROUND MEDIAL BRANCH NERVES INNERVATING LUMBAR FACET JOINT N/A 04/11/2019    Procedure: Lumbo-sacral Block, DR5 and Lateral Branches of S1,S2, S3;  Surgeon: Michelle Pineda Jr., MD;  Location: [facility] PAIN MGT;  Service: Pain Management;  Laterality: N/A;  Pt takes  and states she holds ASA on her own whenever she has procedures.  Instructed to hold x 3 days prior to procedure.      INJECTION OF ANESTHETIC AGENT AROUND MEDIAL BRANCH NERVES INNERVATING LUMBAR FACET JOINT Bilateral 05/03/2023    Procedure: Block-nerve-medial branch-lumbar bilateral L3, L4, L5;  Surgeon: Maile Storm DO;  Location: [facility] PAIN MGT;  Service: Pain Management;  Laterality: Bilateral;  asa    INJECTION OF ANESTHETIC AGENT INTO SACROILIAC JOINT Right 08/30/2018    Procedure: BLOCK, SACROILIAC JOINT-Right- ORAL SEDATION;  Surgeon: Michelle Pineda Jr., MD;  Location: [facility] PAIN MGT;  Service: Pain Management;  Laterality: Right;    INJECTION OF ANESTHETIC AGENT INTO SACROILIAC JOINT Bilateral 09/27/2018    Procedure: BLOCK, SACROILIAC JOINT-BILATERAL;  Surgeon: Michelle Pineda Jr., MD;  Location: [facility] PAIN MGT;  Service: Pain Management;  Laterality: Bilateral;  Please keep at 10:00 due to trasnsportation    INJECTION OF ANESTHETIC AGENT INTO SACROILIAC JOINT Bilateral 02/07/2019    Procedure: Bilateral Sacroiliac Joint Injection - Per Dr Pineda, not necessary to hold ASA.;   Surgeon: Michelle Pineda Jr., MD;  Location: [facility] PAIN MGT;  Service: Pain Management;  Laterality: Bilateral;    INTRAOCULAR PROSTHESES INSERTION Right 08/01/2019    Procedure: INSERTION, IOL PROSTHESIS;  Surgeon: Karen Song MD;  Location: [facility] OR [address] [place] [address];  Service: Ophthalmology;  Laterality: Right;    INTRAOCULAR PROSTHESES INSERTION Left 09/26/2019    Procedure: INSERTION, IOL PROSTHESIS;  Surgeon: Karen Song MD;  Location: [facility] OR [address] [place] [address];  Service: Ophthalmology;  Laterality: Left;    JOINT REPLACEMENT Right     knee    KNEE SURGERY      MASTECTOMY Right 05/09/2022    Procedure: MASTECTOMY-Right;  Surgeon: NEAL Dhillon MD;  Location: [facility] OR;  Service: General;  Laterality: Right;  2.5 HOURS    PHACOEMULSIFICATION OF CATARACT Right 08/01/2019    Procedure: PHACOEMULSIFICATION, CATARACT;  Surgeon: Karen Song MD;  Location: [facility] OR [address] [place] [address];  Service: Ophthalmology;  Laterality: Right;    PHACOEMULSIFICATION OF CATARACT Left 09/26/2019    Procedure: PHACOEMULSIFICATION, CATARACT;  Surgeon: Karen Song MD;  Location: [facility] OR [address] [place] [address];  Service: Ophthalmology;  Laterality: Left;    RADIOFREQUENCY ABLATION, NERVE, PERIPHERAL Right 04/24/2024    Procedure: RIGHT L5 Dorsal Ramus and RIGHT Lateral Branches of S1, S2, and S3;  Surgeon: Maile Storm DO;  Location: [facility]IN [facility]NAGEMENT;  Service: Pain Management;  Laterality: Right;  30 mins    RADIOFREQUENCY THERMOCOAGULATION Right 05/07/2019    Procedure: RADIOFREQUENCY THERMAL COAGULATION RIGHT DORSAL RAMUS 5 AND LATERAL BRANCH OF S1, S2 AND S3;  Surgeon: Michelle Pineda Jr., MD;  Location: [facility] PAIN MGT;  Service: Pain Management;  Laterality: Right;    RADIOFREQUENCY THERMOCOAGULATION Left 05/14/2019    Procedure: RADIOFREQUENCY THERMAL COAGULATION LEFT DORSAL RAMUS 5 AND LATERAL BRANCH OF S1,S2 AND S3;  Surgeon: Michelle Pineda Jr., MD;  Location: [facility] PAIN MGT;  Service: Pain Management;  Laterality: Left;    RADIOFREQUENCY  THERMOCOAGULATION Right 10/22/2019    Procedure: RADIOFREQUENCY THERMAL COAGULATION---DARSAL RAMUS 5 and LATERAL S1,S2,and S3 Right;  Surgeon: Michelle Pineda Jr., MD;  Location: [facility];  Service: Pain Management;  Laterality: Right;  patient to sign consent DOS    RADIOFREQUENCY THERMOCOAGULATION Left 10/29/2019    Procedure: RADIOFREQUENCY THERMAL COAGULATION - LEFT - DR5, S1,S2, AND S3;  Surgeon: Michelle Pineda Jr., MD;  Location: [facility];  Service: Pain Management;  Laterality: Left;    RADIOFREQUENCY THERMOCOAGULATION Left 05/26/2020    Procedure: RADIOFREQUENCY THERMAL COAGULATION--Left DR5+ lateral branches of S1, S2, S3;  Surgeon: Michelle Pineda Jr., MD;  Location: [facility];  Service: Pain Management;  Laterality: Left;    RADIOFREQUENCY THERMOCOAGULATION Right 06/02/2020    Procedure: RADIOFREQUENCY THERMAL COAGULATION--Right DR5+ lateral branches of S1, S2, S3;  Surgeon: Michelle Pineda Jr., MD;  Location: [facility];  Service: Pain Management;  Laterality: Right;    SENTINEL LYMPH NODE BIOPSY Right 05/09/2022    Procedure: BIOPSY, LYMPH NODE, SENTINEL-Right;  Surgeon: NEAL Dhillon MD;  Location: [facility];  Service: General;  Laterality: Right;    SINUS SURGERY      Surgery on right knee  07/09/1982    TONSILLECTOMY      tumor removed from back left side upper shoulder  03/17/2006     Family History   Problem Relation Name Age of Onset    Asthma Mother      Colon cancer Mother      Psoriasis Mother      Cancer Mother          colon    Depression Mother      Cancer Father          lymphoma    Stroke Sister      Diabetes Brother x2     Arthritis Brother x2     Heart disease Brother x2         cad    No Known Problems Daughter      Hypertension Neg Hx      Suicide Neg Hx      Amblyopia Neg Hx      Blindness Neg Hx      Cataracts Neg Hx      Glaucoma Neg Hx      Macular degeneration Neg Hx      Retinal detachment Neg Hx      Strabismus Neg Hx      Eczema Neg Hx      Allergies  Neg Hx       Social History     Socioeconomic History    Marital status:     Number of children: 1   Occupational History    Occupation: retired asst  [employer].     Employer: 2010   Tobacco Use    Smoking status: Every Day     Current packs/day: 0.00     Average packs/day: 0.3 packs/day for 10.0 years (2.5 ttl pk-yrs)     Types: Cigarettes     Start date: 1/10/2013     Last attempt to quit: 1/10/2023     Years since quittin.3     Passive exposure: Never    Smokeless tobacco: Former    Tobacco comments:     about 5 cig a day   Substance and Sexual Activity    Alcohol use: No    Drug use: No    Sexual activity: Not Currently     Partners: Male     Birth control/protection: Post-menopausal   Social History Narrative    Exercise:  Childcare.        Ett:  3 days a week        Daughter and her 3 kids live with her.         Social Determinants of Health     Financial Resource Strain: Low Risk  (2022)    Overall Financial Resource Strain (CARDIA)     Difficulty of Paying Living Expenses: Not hard at all   Food Insecurity: No Food Insecurity (2022)    Hunger Vital Sign     Worried About Running Out of Food in the Last Year: Never true     Ran Out of Food in the Last Year: Never true   Transportation Needs: No Transportation Needs (2022)    PRAPARE - Transportation     Lack of Transportation (Medical): No     Lack of Transportation (Non-Medical): No   Physical Activity: Sufficiently Active (2022)    Exercise Vital Sign     Days of Exercise per Week: 5 days     Minutes of Exercise per Session: 40 min   Stress: No Stress Concern Present (2022)    [employer] [place] [employer] - Occupational Stress Questionnaire     Feeling of Stress : Not at all   Housing Stability: Low Risk  (2022)    Housing Stability Vital Sign     Unable to Pay for Housing in the Last Year: No     Number of Places Lived in the Last Year: 1     Unstable Housing in the Last Year: No  "      Review of Systems:  ROS  CV: no syncope  ENT: no sore throat  Resp: per hpi  Eyes: no eye pain  Gastrointestinal: no nausea or vomiting  Integument/Breast: no rash  Musculoskeletal: no arthralgias  Neurological: no headaches  Behavioral/Psych: no confusion or depression  Heme: no bleeding      OBJECTIVE:     Vital Signs  Vitals:    05/03/24 1400   BP: (!) 140/82   BP Location: Left arm   Patient Position: Sitting   BP Method: Medium (Manual)   Pulse: 69   SpO2: 99%   Weight: 46.7 kg (102 lb 15.3 oz)   Height: 5' 2" (1.575 m)     Body mass index is 18.83 kg/m².    Physical Exam:  Physical Exam  General: no distress  Eyes:  conjunctivae/corneas clear  Nose: no discharge  Neck: trachea midline with no masses appreciated  Lungs:  normal respiratory effort, no wheezes, no rales  Heart: regular rate and rhythm and no murmur  Abdomen: non-distended  Extremities: no cyanosis, no edema, no clubbing  Skin: No rashes or lesions. good skin turgor  Neurologic: alert, oriented, thought content appropriate    Laboratory:  CBC  Lab Results   Component Value Date    WBC 7.47 02/26/2024    HGB 12.5 02/26/2024    HCT 39.6 02/26/2024     02/26/2024    MCV 97 02/26/2024    RDW 18.0 (H) 02/26/2024     BMP  Lab Results   Component Value Date     03/11/2024    K 3.9 03/11/2024     03/11/2024    CO2 29 03/11/2024    BUN 5 (L) 03/11/2024    CREATININE 0.8 03/11/2024     (H) 03/11/2024    CALCIUM 9.1 03/11/2024    MG 1.8 04/03/2023    PHOS 5.4 (H) 04/03/2023     LFTs  Lab Results   Component Value Date    PROT 5.9 (L) 02/07/2024    ALBUMIN 3.5 02/07/2024    BILITOT 0.4 02/07/2024    AST 22 02/07/2024    ALKPHOS 69 02/07/2024    ALT 16 02/07/2024    GGT 91 (H) 07/28/2014       PFT  5/2024                  FEV1/FVC           70  FEV1                   1.72 (87%)   FVC                     2.44 (96.2%)   RV                       2.11 (109%)   TLC                     4.54 (102%)   DLCO                  55.2%        "      My interpretation (most recent):No obstruction or restriction. Mild decrease in DLCO.    Chest Imaging, My Impression:   CXR: 1/25/2024  No acute pulmonary process. Tortuous aorta.      Diagnostic Results:  X-Ray: Reviewed  Echo: Reviewed    ASSESSMENT/PLAN:     No problems updated.  Problem List Items Addressed This Visit          Pulmonary    Cough    Current Assessment & Plan     Patient presenting with chronic productive cough in the background of significant smoking history. Chart diagnosis of COPD however has not had PFTs. PFTs in clinic today without obstruction. Largely normal. This is NOT consistent with COPD. Has very significant post nasal drip and sinus drainage for which she follows with ENT. On flonase. This history and clinical picture is consistent with upper airway cough syndrome 2/2 post nasal drip.    - Recommend aggressive treatment of post nasal drip. Starting singulair, continue flonase, and counseled on using consistently for the next 6 weeks.  - Counseled on smoking cessation, referral to smoking cessation sent  - Low dose CT for lung cancer screening   - Follow up in 3 months         Relevant Medications    montelukast (SINGULAIR) 10 mg tablet     Other Visit Diagnoses       Upper airway cough syndrome    -  Primary    Relevant Medications    montelukast (SINGULAIR) 10 mg tablet    Tobacco abuse        Relevant Orders    CT Chest Lung Screening Low Dose    Ambulatory referral/consult to Smoking Cessation Program    Personal history of nicotine dependence        Relevant Orders    CT Chest Lung Screening Low Dose            Evans Kearns M.D.  [facility] Pulmonary & Critical Care Fellow

## 2024-05-03 NOTE — ASSESSMENT & PLAN NOTE
Patient presenting with chronic productive cough in the background of significant smoking history. Chart diagnosis of COPD however has not had PFTs. PFTs in clinic today without obstruction. Largely normal. This is NOT consistent with COPD. Has very significant post nasal drip and sinus drainage for which she follows with ENT. On flonase. This history and clinical picture is consistent with upper airway cough syndrome 2/2 post nasal drip.    - Recommend aggressive treatment of post nasal drip. Starting singulair, continue flonase, and counseled on using consistently for the next 6 weeks.  - Counseled on smoking cessation, referral to smoking cessation sent  - Low dose CT for lung cancer screening   - Follow up in 3 months

## 2024-05-03 NOTE — PROGRESS NOTES
68 year old woman with history of RA on MTX followed by rheumatology referred by ENT for evaluation of ENT.   Active tobacco use.  PFTs today show no evidence of obstruction, no restriction, and moderately reduced DLCO.  Based on these findings the patient DOES NOT HAVE COPD.   Chronic cough due to UACS. +PND on flonase  Start singulair.  Check CT Chest as part of lung cancer surveillance.

## 2024-05-06 ENCOUNTER — TELEPHONE (OUTPATIENT)
Dept: PAIN MEDICINE | Facility: CLINIC | Age: 69
End: 2024-05-06
Payer: MEDICARE

## 2024-05-06 NOTE — TELEPHONE ENCOUNTER
----- Message from Carla Harvey MA sent at 5/6/2024 12:00 PM CDT -----  Please assist.  ----- Message -----  From: Abiola Belcher  Sent: 5/6/2024  11:18 AM CDT  To: Em[facility] Gr Staff    Type:  Patient Returning Call    Who Called:pt  Who Left Message for Patient:  Does the patient know what this is regarding?:  Would the patient rather a call back or a response via [facility]? call  Best Call Back Number: [phone]  Additional Information: Pt wants to know How many shots she is getting in her spine. If more than one she would like to have anesthesia

## 2024-05-07 LAB
DLCO ADJ PRE: 10.73 ML/(MIN*MMHG) (ref 13.69–25.15)
DLCO SINGLE BREATH LLN: 13.69
DLCO SINGLE BREATH PRE REF: 53.7 %
DLCO SINGLE BREATH REF: 19.42
DLCOC SBVA LLN: 2.76
DLCOC SBVA PRE REF: 62.2 %
DLCOC SBVA REF: 4.38
DLCOC SINGLE BREATH LLN: 13.69
DLCOC SINGLE BREATH PRE REF: 55.2 %
DLCOC SINGLE BREATH REF: 19.42
DLCOCSBVAULN: 6
DLCOCSINGLEBREATHULN: 25.15
DLCOSINGLEBREATHULN: 25.15
DLCOVA LLN: 2.76
DLCOVA PRE REF: 60.4 %
DLCOVA PRE: 2.64 ML/(MIN*MMHG*L) (ref 2.76–6)
DLCOVA REF: 4.38
DLCOVAULN: 6
DLVAADJ PRE: 2.72 ML/(MIN*MMHG*L) (ref 2.76–6)
ERV LLN: -16449.38
ERV PRE REF: 141.1 %
ERV REF: 0.62
ERVULN: ABNORMAL
FEF 25 75 LLN: 0.79
FEF 25 75 PRE REF: 60.3 %
FEF 25 75 REF: 1.72
FEV05 LLN: 0.7
FEV05 REF: 1.56
FEV1 FVC LLN: 65
FEV1 FVC PRE REF: 89.8 %
FEV1 FVC REF: 78
FEV1 LLN: 1.43
FEV1 PRE REF: 87 %
FEV1 REF: 1.97
FRCPLETH LLN: 1.72
FRCPLETH PREREF: 117.2 %
FRCPLETH REF: 2.54
FRCPLETHULN: 3.36
FVC LLN: 1.84
FVC PRE REF: 96.2 %
FVC REF: 2.53
IVC PRE: 2.06 L (ref 1.84–3.26)
IVC SINGLE BREATH LLN: 1.84
IVC SINGLE BREATH PRE REF: 81.1 %
IVC SINGLE BREATH REF: 2.53
IVCSINGLEBREATHULN: 3.26
LLN IC: -16448.36
PEF LLN: 3.64
PEF PRE REF: 85.9 %
PEF REF: 5.2
PHYSICIAN COMMENT: ABNORMAL
PRE DLCO: 10.42 ML/(MIN*MMHG) (ref 13.69–25.15)
PRE ERV: 0.87 L (ref -16449.38–16450.62)
PRE FEF 25 75: 1.04 L/S (ref 0.79–3.06)
PRE FET 100: 6.99 SEC
PRE FEV05 REF: 90.3 %
PRE FEV1 FVC: 70.47 % (ref 65–90.01)
PRE FEV1: 1.72 L (ref 1.43–2.49)
PRE FEV5: 1.41 L (ref 0.7–2.41)
PRE FRC PL: 2.98 L (ref 1.72–3.36)
PRE FVC: 2.44 L (ref 1.84–3.26)
PRE IC: 1.57 L (ref -16448.36–16451.64)
PRE PEF: 4.46 L/S (ref 3.64–6.75)
PRE REF IC: 95.3 %
PRE RV: 2.11 L (ref 1.35–2.5)
PRE TLC: 4.54 L (ref 3.45–5.42)
RAW PRE REF: 167 %
RAW PRE: 5.11 CMH2O*S/L (ref 3.06–3.06)
RAW REF: 3.06
REF IC: 1.64
RV LLN: 1.35
RV PRE REF: 109.5 %
RV REF: 1.92
RVTLC LLN: 33
RVTLC PRE REF: 108.4 %
RVTLC PRE: 46.37 % (ref 33.17–52.35)
RVTLC REF: 43
RVTLCULN: 52
RVULN: 2.5
SGAW PRE REF: 55.8 %
SGAW PRE: 0.06 1/(CMH2O*S) (ref 0.1–0.1)
SGAW REF: 0.1
TLC LLN: 3.45
TLC PRE REF: 102.4 %
TLC REF: 4.44
TLC ULN: 5.42
ULN IC: ABNORMAL
VA PRE: 3.94 L (ref 4.29–4.29)
VA SINGLE BREATH LLN: 4.29
VA SINGLE BREATH PRE REF: 92 %
VA SINGLE BREATH REF: 4.29
VASINGLEBREATHULN: 4.29
VC LLN: 1.84
VC PRE REF: 96.2 %
VC PRE: 2.44 L (ref 1.84–3.26)
VC REF: 2.53
VC ULN: 3.26

## 2024-05-08 ENCOUNTER — TELEPHONE (OUTPATIENT)
Dept: PSYCHIATRY | Facility: CLINIC | Age: 69
End: 2024-05-08
Payer: MEDICARE

## 2024-05-08 ENCOUNTER — TELEPHONE (OUTPATIENT)
Dept: PAIN MEDICINE | Facility: CLINIC | Age: 69
End: 2024-05-08
Payer: MEDICARE

## 2024-05-08 DIAGNOSIS — F41.9 ANXIETY: ICD-10-CM

## 2024-05-08 RX ORDER — GABAPENTIN 100 MG/1
100 CAPSULE ORAL 3 TIMES DAILY
Qty: 270 CAPSULE | Refills: 3 | Status: SHIPPED | OUTPATIENT
Start: 2024-05-08

## 2024-05-08 NOTE — TELEPHONE ENCOUNTER
----- Message from Carla Harvey MA sent at 5/7/2024  4:59 PM CDT -----    ----- Message -----  From: Honorio Bro  Sent: 5/7/2024   4:57 PM CDT  To: Em[facility] Gr Staff    Type:  RX Refill Request    Who Called: Pt  Refill or New Rx:refill  RX Name and Strength:Gabapentin 300mg  How is the patient currently taking it? (ex. 1XDay):3X  Is this a 30 day or 90 day RX:90  Preferred Pharmacy with phone number:[id number] - [place] - [address] [facility]  [address]   Phone: [phone]  Fax: [phone]  Local or Mail Order:Local  Ordering Provider:Emeterio  Would the patient rather a call back or a response via [facility]? Call back  Best Call Back Number:[phone]   Additional Information: Patient advise she is out of her medicine and has been out for two days now.

## 2024-05-09 DIAGNOSIS — L40.50 PSORIATIC ARTHRITIS: ICD-10-CM

## 2024-05-09 RX ORDER — HYDROCODONE BITARTRATE AND ACETAMINOPHEN 5; 325 MG/1; MG/1
1 TABLET ORAL EVERY 8 HOURS PRN
Qty: 30 TABLET | Refills: 0 | Status: SHIPPED | OUTPATIENT
Start: 2024-05-09 | End: 2024-05-29 | Stop reason: SDUPTHER

## 2024-05-09 RX ORDER — METHOTREXATE 2.5 MG/1
TABLET ORAL
Qty: 144 TABLET | Refills: 0 | Status: SHIPPED | OUTPATIENT
Start: 2024-05-09

## 2024-05-09 NOTE — TELEPHONE ENCOUNTER
----- Message from Viktor Drake sent at 5/9/2024 10:28 AM CDT -----  Contact: Pt [phone]  Requesting an RX refill or new RX.  Is this a refill or new RX: Refill  RX name and strength HYDROcodone-acetaminophen (NORCO) 5-325 mg per tablet  Pharmacy name and phone # [id number] - [place] - [address] AT [facility] [address]  Phone: [phone]  Fax: [phone]

## 2024-05-09 NOTE — TELEPHONE ENCOUNTER
No care due was identified.  [facility] Embedded Care Due Messages. Reference number: [id number].   5/09/2024 1:54:57 PM CDT

## 2024-05-14 ENCOUNTER — HOSPITAL ENCOUNTER (OUTPATIENT)
Dept: RADIOLOGY | Facility: HOSPITAL | Age: 69
Discharge: HOME | End: 2024-05-14
Attending: STUDENT IN AN ORGANIZED HEALTH CARE EDUCATION/TRAINING PROGRAM
Payer: MEDICARE

## 2024-05-14 DIAGNOSIS — Z72.0 TOBACCO ABUSE: ICD-10-CM

## 2024-05-14 DIAGNOSIS — Z87.891 PERSONAL HISTORY OF NICOTINE DEPENDENCE: ICD-10-CM

## 2024-05-14 PROCEDURE — 71271 CT THORAX LUNG CANCER SCR C-: CPT | Mod: 26,,, | Performed by: RADIOLOGY

## 2024-05-14 PROCEDURE — 71271 CT THORAX LUNG CANCER SCR C-: CPT | Mod: TC

## 2024-05-15 ENCOUNTER — TELEPHONE (OUTPATIENT)
Dept: PAIN MEDICINE | Facility: CLINIC | Age: 69
End: 2024-05-15
Payer: MEDICARE

## 2024-05-16 ENCOUNTER — TELEPHONE (OUTPATIENT)
Dept: PAIN MEDICINE | Facility: CLINIC | Age: 69
End: 2024-05-16
Payer: MEDICARE

## 2024-05-16 NOTE — TELEPHONE ENCOUNTER
Called pt. In reference to message. I relayed Dr. Storm's message to MsJael Yasemin. Pt. Had verbal understanding.  ----- Message from Maile Storm DO sent at 5/16/2024 10:57 AM CDT -----  Please have the patient go to the ED or Urgent Care for evaluation.  ----- Message -----  From: Carla Harvey MA  Sent: 5/16/2024   8:29 AM CDT  To: Maile Storm DO    Please assist.  ----- Message -----  From: Evelyn Mao  Sent: 5/15/2024   5:04 PM CDT  To: Em[facility] Gr Staff    Type: General Call Back    Name of Caller:YASEMINPHUONG KYM [[id number]]  Reason Right ankle and foot swollen  Would the patient rather a call back or a response via [facility]? call  Best Call Back Number: [phone]  Additional Information: Patient state right ankle and foot is swollen and not able to put shoes on, only can wear crocs.  Patient states that she has put ice on it and it remains to be very painful.  Patient would like for provider to please give a call back as soon as possible to advise and further assist.

## 2024-05-17 ENCOUNTER — PATIENT MESSAGE (OUTPATIENT)
Dept: PAIN MEDICINE | Facility: CLINIC | Age: 69
End: 2024-05-17
Payer: MEDICARE

## 2024-05-20 ENCOUNTER — TELEPHONE (OUTPATIENT)
Dept: ALLERGY | Facility: CLINIC | Age: 69
End: 2024-05-20
Payer: MEDICARE

## 2024-05-20 ENCOUNTER — TELEPHONE (OUTPATIENT)
Dept: HEMATOLOGY/ONCOLOGY | Facility: CLINIC | Age: 69
End: 2024-05-20
Payer: MEDICARE

## 2024-05-20 NOTE — TELEPHONE ENCOUNTER
----- Message from Sheryl Briones sent at 5/20/2024  5:00 PM CDT -----  Contact: [phone]  PATIENTCALL     Pt is calling to speak with someone in provider office regarding she needs to know about her shot that is scheduled for 05/22 she has an infusion that is scheduled for 05/21 at 3:00 she needs to know can she do both she is asking for a return call please call.

## 2024-05-20 NOTE — TELEPHONE ENCOUNTER
Spoke to patient informed patient message sent to cheyanne for advice regarding scheduled zometa treatment and scheduled Lumbar radiculopathy surgery. Message also sent requesting bone density scan orders. Will follow up with provider advice.         ----- Message from Kai Saxena sent at 2024  8:28 AM CDT -----  Name Of Caller: Bhavna        Provider Name: Cheyanne Smith        Does patient feel the need to be seen today? no        Relationship to the Pt?: patient        Contact Preference?:  [phone]          What is the nature of the call?:Patient is requesting that an order for a bone density be placed in the system, we went to schedule from the current order but it is indicating that the order is .

## 2024-05-20 NOTE — TELEPHONE ENCOUNTER
"Called patient to inform her per yvonne "zometa should not interfere with her epidural injection. However, she may want to make her pain management team aware to make sure thy have no problem with it on their end." Patient stated she reached out to pain management md to make sure its okay to get zometa infusion the day before epidural injection. She is waiting to hear back from pain management md . She will follow up with this clinic once she has an answer from pain management.   "

## 2024-05-20 NOTE — PRE-PROCEDURE INSTRUCTIONS
Unable to reach pt via phone.  Left voicemail with arrival time also informing pt of need for responsible  accompaniment and instructing pt to follow pre-procedure instructions provided via [facility] portal.  The following message was sent to pt's portal.      Dear Jessica ,     You are scheduled for a procedure with Dr. Storm on 5/22/2024.  Your scheduled arrival time is 08:40 am.  This arrival time is roughly 1 hour before your anticipated procedure time to allow sufficient time for pre-op..  Please wear comfortable clothes. You will be placed in a gown for your procedure.  Please do not wear a dress.  This procedure will take place at the [facility] at the corner of [facility] and [facility].  [facility] to [facility].  The address is:     [address] [place] [address].  [place] [id number]     After entering the building, you will proceed to the second floor where you can check in with registration. You should take any medications that you routinely take for blood pressure, heart medications, thyroid, cholesterol, etc.      The fasting restrictions are dependent on whether or not you are receiving sedation.  Sedation is not available for all procedures.      Your fasting instructions are as follow:  IV sedation. You should not eat for 8 hours and can only drink clear liquids (water or black coffee without cream/sugar) up until 2 hours before your scheduled time.  You CANNOT drive yourself and must have a .     If you are on blood thinners, you need to follow the anticoagulation instructions that had been discussed previously.  You should only stop the blood thinners if it was approved by your primary care physician or your cardiologist.  In the event that you are not able to stop your blood thinners, a blood thinner was not listed on your medication list, or we were not able to get clearance from your cardiologist, then the procedure may have to be  postponed/canceled.      IF you were told to stop your blood thinners, this is how long you should generally hold some of the more common ones.  Remember that stopping blood thinners is only necessary for certain procedures. If you are unsure of your instructions, please call us.   Aspirin - 5 days  Plavix/Clopidogrel - 7 days  Warfarin / Coumadin - 5 days  Eliquis - 3 days  Pradaxa/Dabigatran - 4 days  Xarelto/Rivaroxaban - 3 days     If you are a diabetic, do not take your medication if you will be fasting, but bring it with you. Please plan on being here for roughly 3 hours.     Please call us if you have been sick (running fever, having any flu-like symptoms) or have been taking antibiotics in the past 2 weeks or had any outpatient procedures other than with us (colonoscopy, endoscopy, OBGYN, dental, etc.). If you have been previously COVID positive, you will need to hold off on your procedure until you are symptom free for 10 days. If you did not have any symptoms, you can have your procedure 10 days from your positive test result.       *HOLD ALL VITAMINS, MINERALS, HERBS (INCLUDING HERBAL TEAS) AND SUPPLEMENTS  *SHOWER WITH ANTIBACTERIAL SOAP (EX. DIAL) NIGHT BEFORE AND MORNING OF PROCEDURE  *DO NOT APPLY ANY LOTIONS, OILS, POWDERS, PERFUME/COLOGNE, OINTMENTS, GELS, CREAMS, MAKEUP OR DEODORANT TO YOUR SKIN MORNING OF PROCEDURE  *LEAVE JEWELRY AND ANY VALUABLES AT HOME  *WEAR LOOSE COMFORTABLE CLOTHING (PREFERABLY A BUTTON UP SHIRT)     Please reply to this message as receipt of delivery.     Thank you,  [facility] &  [place], LPN [facility]  Pre-Admit

## 2024-05-21 ENCOUNTER — OFFICE VISIT (OUTPATIENT)
Dept: HEMATOLOGY/ONCOLOGY | Facility: CLINIC | Age: 69
End: 2024-05-21
Payer: MEDICARE

## 2024-05-21 ENCOUNTER — TELEPHONE (OUTPATIENT)
Dept: PAIN MEDICINE | Facility: CLINIC | Age: 69
End: 2024-05-21
Payer: MEDICARE

## 2024-05-21 ENCOUNTER — TELEPHONE (OUTPATIENT)
Dept: HEMATOLOGY/ONCOLOGY | Facility: CLINIC | Age: 69
End: 2024-05-21
Payer: MEDICARE

## 2024-05-21 ENCOUNTER — INFUSION (OUTPATIENT)
Dept: INFUSION THERAPY | Facility: HOSPITAL | Age: 69
End: 2024-05-21
Payer: MEDICARE

## 2024-05-21 ENCOUNTER — PATIENT MESSAGE (OUTPATIENT)
Dept: PAIN MEDICINE | Facility: CLINIC | Age: 69
End: 2024-05-21
Payer: MEDICARE

## 2024-05-21 VITALS
RESPIRATION RATE: 18 BRPM | TEMPERATURE: 98 F | DIASTOLIC BLOOD PRESSURE: 67 MMHG | WEIGHT: 101.44 LBS | OXYGEN SATURATION: 95 % | BODY MASS INDEX: 18.67 KG/M2 | HEART RATE: 69 BPM | SYSTOLIC BLOOD PRESSURE: 152 MMHG | HEIGHT: 62 IN

## 2024-05-21 VITALS
RESPIRATION RATE: 18 BRPM | BODY MASS INDEX: 18.67 KG/M2 | HEIGHT: 62 IN | TEMPERATURE: 98 F | HEART RATE: 70 BPM | WEIGHT: 101.44 LBS | DIASTOLIC BLOOD PRESSURE: 69 MMHG | SYSTOLIC BLOOD PRESSURE: 162 MMHG

## 2024-05-21 DIAGNOSIS — M85.89 OSTEOPENIA OF MULTIPLE SITES: Primary | ICD-10-CM

## 2024-05-21 DIAGNOSIS — M85.89 OSTEOPENIA OF MULTIPLE SITES: ICD-10-CM

## 2024-05-21 DIAGNOSIS — Z17.0 CARCINOMA OF UPPER-OUTER QUADRANT OF RIGHT BREAST IN FEMALE, ESTROGEN RECEPTOR POSITIVE: Primary | ICD-10-CM

## 2024-05-21 DIAGNOSIS — Z79.811 USE OF ANASTROZOLE: ICD-10-CM

## 2024-05-21 DIAGNOSIS — M25.50 AROMATASE INHIBITOR-ASSOCIATED ARTHRALGIA: ICD-10-CM

## 2024-05-21 DIAGNOSIS — M25.559 HIP PAIN, UNSPECIFIED LATERALITY: ICD-10-CM

## 2024-05-21 DIAGNOSIS — R63.4 WEIGHT LOSS: ICD-10-CM

## 2024-05-21 DIAGNOSIS — I48.0 PAF (PAROXYSMAL ATRIAL FIBRILLATION): ICD-10-CM

## 2024-05-21 DIAGNOSIS — C50.411 CARCINOMA OF UPPER-OUTER QUADRANT OF RIGHT BREAST IN FEMALE, ESTROGEN RECEPTOR POSITIVE: Primary | ICD-10-CM

## 2024-05-21 DIAGNOSIS — D50.9 IRON DEFICIENCY ANEMIA, UNSPECIFIED IRON DEFICIENCY ANEMIA TYPE: ICD-10-CM

## 2024-05-21 DIAGNOSIS — C77.3 SECONDARY AND UNSPECIFIED MALIGNANT NEOPLASM OF AXILLA AND UPPER LIMB LYMPH NODES: ICD-10-CM

## 2024-05-21 DIAGNOSIS — T45.1X5A AROMATASE INHIBITOR-ASSOCIATED ARTHRALGIA: ICD-10-CM

## 2024-05-21 DIAGNOSIS — I70.0 ATHEROSCLEROSIS OF AORTA: ICD-10-CM

## 2024-05-21 PROCEDURE — 1101F PT FALLS ASSESS-DOCD LE1/YR: CPT | Mod: CPTII,S$GLB,, | Performed by: NURSE PRACTITIONER

## 2024-05-21 PROCEDURE — 4010F ACE/ARB THERAPY RXD/TAKEN: CPT | Mod: CPTII,S$GLB,, | Performed by: NURSE PRACTITIONER

## 2024-05-21 PROCEDURE — 96374 THER/PROPH/DIAG INJ IV PUSH: CPT

## 2024-05-21 PROCEDURE — 1159F MED LIST DOCD IN RCRD: CPT | Mod: CPTII,S$GLB,, | Performed by: NURSE PRACTITIONER

## 2024-05-21 PROCEDURE — 3288F FALL RISK ASSESSMENT DOCD: CPT | Mod: CPTII,S$GLB,, | Performed by: NURSE PRACTITIONER

## 2024-05-21 PROCEDURE — 3077F SYST BP >= 140 MM HG: CPT | Mod: CPTII,S$GLB,, | Performed by: NURSE PRACTITIONER

## 2024-05-21 PROCEDURE — G2211 COMPLEX E/M VISIT ADD ON: HCPCS | Mod: S$GLB,,, | Performed by: NURSE PRACTITIONER

## 2024-05-21 PROCEDURE — 3008F BODY MASS INDEX DOCD: CPT | Mod: CPTII,S$GLB,, | Performed by: NURSE PRACTITIONER

## 2024-05-21 PROCEDURE — 99999 PR PBB SHADOW E&M-EST. PATIENT-LVL V: CPT | Mod: PBBFAC,,, | Performed by: NURSE PRACTITIONER

## 2024-05-21 PROCEDURE — 3078F DIAST BP <80 MM HG: CPT | Mod: CPTII,S$GLB,, | Performed by: NURSE PRACTITIONER

## 2024-05-21 PROCEDURE — 99214 OFFICE O/P EST MOD 30 MIN: CPT | Mod: S$GLB,,, | Performed by: NURSE PRACTITIONER

## 2024-05-21 PROCEDURE — 1125F AMNT PAIN NOTED PAIN PRSNT: CPT | Mod: CPTII,S$GLB,, | Performed by: NURSE PRACTITIONER

## 2024-05-21 PROCEDURE — 63600175 PHARM REV CODE 636 W HCPCS: Performed by: NURSE PRACTITIONER

## 2024-05-21 PROCEDURE — 25000003 PHARM REV CODE 250: Performed by: NURSE PRACTITIONER

## 2024-05-21 RX ORDER — HEPARIN 100 UNIT/ML
500 SYRINGE INTRAVENOUS
Status: DISCONTINUED | OUTPATIENT
Start: 2024-05-21 | End: 2024-05-21 | Stop reason: HOSPADM

## 2024-05-21 RX ORDER — SODIUM CHLORIDE 0.9 % (FLUSH) 0.9 %
10 SYRINGE (ML) INJECTION
OUTPATIENT
Start: 2024-10-08

## 2024-05-21 RX ORDER — SODIUM CHLORIDE 0.9 % (FLUSH) 0.9 %
10 SYRINGE (ML) INJECTION
Status: DISCONTINUED | OUTPATIENT
Start: 2024-05-21 | End: 2024-05-21 | Stop reason: HOSPADM

## 2024-05-21 RX ORDER — ZOLEDRONIC ACID 0.04 MG/ML
4 INJECTION, SOLUTION INTRAVENOUS
OUTPATIENT
Start: 2024-10-08

## 2024-05-21 RX ORDER — HEPARIN 100 UNIT/ML
500 SYRINGE INTRAVENOUS
OUTPATIENT
Start: 2024-10-08

## 2024-05-21 RX ADMIN — ZOLEDRONIC ACID 3.3 MG: 4 INJECTION, SOLUTION, CONCENTRATE INTRAVENOUS at 03:05

## 2024-05-21 RX ADMIN — SODIUM CHLORIDE: 9 INJECTION, SOLUTION INTRAVENOUS at 03:05

## 2024-05-21 NOTE — TELEPHONE ENCOUNTER
I left a message for the patient and also reached out to her through the portal to let her know that there is no issue on our end with her zometa infusion.

## 2024-05-21 NOTE — PROGRESS NOTES
PROGRESS NOTE    Subjective:       Patient ID: Bhavna Landry is a 68 y.o. female.  MRN: [id number]  : 1955    Chief Complaint: 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    pT1b N0 (i+)  grade 2 ER + DC - HER2 - IDC of the right breast.    History of Present Illness:   Bhavna Landry is a 68 y.o. female who is referred for right breast IDC. She is a former patient of Dr. Salcedo.     Per Dr. Marcelino's note:   She presented for screening mammogram on 2022 . This identified an asymmetry in the right breast. Follow-up mammogram and ultrasound on 2022 showed a 1 x 0.9 x 0.8 cm mass at the 9 o'clock position of the right breast 3 cm from the nipple. A ultrasound guided biopsy was performed on 2022 with pathology revealing invasive ductal carcinoma of the breast.     She saw Dr. Dhillon and underwent right mastectomy and sentinel node exam.  It showed IDC 8 mm, grade 2, ER strongly positive, DC negative, HER2/jayla 1+, Ki-67 12%.  There was also DCIS, multiple foci up to 8 mm, spanning a distance of 20 mm.  Nuclear grade 2. Two sentinel nodes removed, 1 had isolated tumor cells.    Oncotype DX RS 28; Chemo benefit >15%     She has declined adjuvant chemotherapy.     Started Anastrozole  22, switched to exemestane due to arthralgias.     Interim history:   She presents today for regular follow-up.     Tolerating exemestane fairly well, taking daily. No night sweats, has stable chronic joint pains.     Diffuse joint pains, no exacerbation with exemestane. Continues Tramadol. Will be getting an epidural with pain management tomorrow for low back pain with sciatica    She was recently diagnosed with TONIE and B12 def. On B12, completed iron. Had a colonoscopy in Dec,(sessile polyps)  had EGD in February that showed gastritis.     Weight and appetite are improving. Eating and drinking well, bowel movements are normal (takes stools  softeners), Reports mild bilateral ankle swelling today    Survivorship  Cardiac toxicity: no chest pain or sob    Bone Health: takes vitamin D, does not take calcium currently because has had high calcium levels in the past, last bone density was 3/18/24, on zometa every 6 months    Anxiety/depression/ptsd: mood stable, followed by Dr. Poe with psychiatry    Cognitive function: lives with daughter, son in law and grandchildren, granddaughter drove her here today, does report some memory concerns    Fatigue: low energy level    Lymphedema: no swelling in arms    Pain: has chronic generalized arthralgias, see above    Sleep Disorder: sleeping ok at night, but intermittently wakes up at 2 am and has trouble falling back asleep    Healthy Lifestyle: weight/diet/exercise/social/spiritual connections    Health Maintenance: mammogram is scheduled for 6/3/24, Had a colonoscopy in Dec,(sessile polyps)  had EGD in February that showed gastritis. PCP is Dr. Vargas    Oncology History:  1. Breast, right, mastectomy:   - Invasive ductal carcinoma       - Greatest dimension:  8 mm       - Jonathan-Alaniz modified SBR score 3 + 2 + 1 = 6 of 9       - Histologic grade 2 of 3       - Mitotic index = 0.2 (average mitoses per high power field) (low)       - Resection margin free of invasive carcinoma           - Invasive carcinoma present 9 mm to closest posterior margin, 26 mm   to inferior margin, and 45 mm to superior margin   - Biopsy clip present   - No lymphovascular invasion is identified   - Ductal carcinoma in situ       - Foci up to 8 mm in greatest dimension spanning a distance of   approximately 20 mm       - Nuclear grade ranges from 2 to 3 of 3       - Solid and cribriform patterns       - Central comedonecrosis present:  Approximately 80%       - Resection margin free of DCIS           - DCIS present present 9 mm to closest posterior margin, 26 mm to   inferior margin, and 45 mm to superior margin   - Background  "breast contains usual ductal hyperplasia, apocrine metaplasia,   microcysts and macrocysts, ectatic ducts, and stromal fibrosis   - Microcalcifications, calcium phosphate type, associated with DCIS and   focally with medial calcific arterial stenosis (Monckeberg's sclerosis)   - See CAP Tumor Synoptic   2. [place] lymph node, right axillary, "#1 hot and blue count 2004,"   excision:   - 1 lymph node, isolated tumor cells present   3. [place] lymph node, right axillary, "#2 hot and blue count 1700,"   excision:   - 1 lymph node, negative for metastatic carcinoma   CAP Tumor Synoptic:   Procedure:  Total mastectomy   Specimen laterality:  Right   Tumor site:  9 o'clock   Histologic type:  Invasive carcinoma of no special type (ductal)   Histologic grade (Buckhead histologic score):  Shelly score 3 + 2 + 1 =   6 of 9   Glandular (acinar) / tubular differentiation:  Score 3   Nuclear pleomorphism:  Score 2   Mitotic grade:  Score 1   Overall grade:  Grade 2   Tumor size:  8 mm   Tumor focality:  Single focus of invasive carcinoma   Ductal carcinoma in situ (DCIS):  Present (Negative for extensive intraductal   component)       Size (extent) of DCIS:  Foci up to 8 mm in greatest dimension spanning a   distance of approximately 20 mm       Number blocks with DCIS:  4       Number blocks examined:  13       Architectural patterns:  Comedo, cribriform, solid       Nuclear grade:  Ranges from grade 2 (intermediate) to grade 3 (high)   Lobular carcinoma in situ (LCIS):  Not identified   Tumor extent:  Not applicable   Lymphovascular invasion:  Not identified   Dermal lymphovascular invasion:  Not identified   Microcalcifications:  Present in DCIS and nonneoplastic tissue   Treatment effect in the breast:  No known pre-surgical therapy   Treatment effect in the lymph nodes:  Not applicable   Margin status for invasive carcinoma:  All margins negative for invasive   carcinoma       Distance from invasive carcinoma " closest posterior margin:  9 mm       Distance from invasive carcinoma to inferior margin:  26 mm       Distance from invasive carcinoma to superior margin:  45 mm   Margin status for DCIS:  All margins negative for DCIS       Distant from DCIS to closest posterior margin:  9 mm       Distance from DCIS to inferior margin:  26 mm       Distance from DCIS to superior margin:  45 mm   Regional lymph node status:  Tumor present in regional lymph nodes   Number of lymph nodes with macrometastases:  0   Number of lymph nodes with micrometastases:  0   Number of lymph nodes with isolated tumor cells:  1   Size of largest jose l metastatic deposit:  0.13 mm   Extranodal extension:  Not identified   Total number of lymph nodes examined (sentinel and nonsentinel):  2   Number of sentinel nodes examined:  2   Distant sites involved:  Not applicable   TNM descriptors:  Not applicable   pT category:  pT1b   Regional lymph nodes modifier:  (sn)   pN category:  (sn) pN0 (i+)   pM category:  Not applicable - pM cannot be determined from the submitted   specimens   Special studies:  Biomarkers, assessed by immunohistochemistry on prior right   breast biopsy (S-) with following reported results:       - Estrogen Receptor (ER):  Positive (100%; strong intensity)       - Progesterone Receptor (TX):  Negative (<1%)       - HER2:  Negative (1+)       - Ki-67 proliferation index:  12% (low)       Oncology History   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, estrogen receptor positive   4/13/2022 Initial Diagnosis    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, estrogen receptor positive     4/13/2022 Cancer Staged    Staging form: Breast, AJCC 8th Edition  - Clinical stage from 4/13/2022: Stage IA (cT1b, cN0, cM0, G2, ER+, TX-, HER2-)     7/19/2022 - 7/19/2022 Chemotherapy    Treatment Summary   Plan Name: OP BREAST TC - DOCETAXEL CYCLOPHOSPHAMIDE Q3W  Treatment Goal: Curative  Status:  Inactive  Start Date:   End Date:   Provider: Idania Martins MD  Chemotherapy: cyclophosphamide 600 mg/m2 = 880 mg in sodium chloride 0.9% 250 mL chemo infusion, 600 mg/m2, Intravenous, Clinic/HOD 1 time, 0 of 4 cycles  DOCEtaxeL (TAXOTERE) 75 mg/m2 = 110 mg in sodium chloride 0.9% 250 mL chemo infusion, 75 mg/m2, Intravenous, Clinic/HOD 1 time, 0 of 4 cycles         History:  Past Medical History:   Diagnosis Date    Allergy     Amblyopia     Anemia     Anticoagulant long-term use     Arthritis 1992    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, estrogen receptor positive 2022    Cataract     Depression     Dry eyes     Dry mouth     Duane's syndrome of right eye     Early dry stage nonexudative age-related macular degeneration of both eyes 2022    Fibromyalgia 2014    Fibromyalgia     Fractured hip     RIGHT HIP    GERD (gastroesophageal reflux disease)     History of psychiatric hospitalization     Hyperlipidemia 1992    Hypertension     Hypocalcemia     Hyponatremia     Kidney stone     Migraine headache     Osteoporosis     Pressure ulcer of unspecified site, unspecified stage     Psoriatic arthritis 1992    Recurrent upper respiratory infection (URI)     Right knee pain     post knee replacement surgery (possible rejectiion of metal)    RLS (restless legs syndrome)     Schizophrenia 1992    stable on meds    Sciatica     Squamous cell carcinoma of skin     Urinary tract infection       Past Surgical History:   Procedure Laterality Date    brow ptosis repair Right 2019    Surgeon: Dr. Karla Henson    CATARACT EXTRACTION       SECTION      COLONOSCOPY N/A 2016    Procedure: COLONOSCOPY;  Surgeon: ANDRIA Connelly MD;  Location: [address];  Service: Endoscopy;  Laterality: N/A;    COLONOSCOPY N/A 2022    Procedure: COLONOSCOPY;  Surgeon: Mikey Walters MD;  Location: [facility] ENDO;  Service: Endoscopy;  Laterality: N/A;     cyst removed from right sinus  07/09/1982    EPIDURAL STEROID INJECTION INTO CERVICAL SPINE N/A 12/08/2023    Procedure: C6-7 SOPHY;  Surgeon: Spring Jensen MD;  Location: [facility]IN [facility]EMENT;  Service: Pain Management;  Laterality: N/A;  20 mins    EPIDURAL STEROID INJECTION INTO LUMBAR SPINE N/A 08/02/2023    Procedure: Injection-steroid-epidural-lumbar L5-S1;  Surgeon: Chirag Kim MD;  Location: [facility];  Service: Pain Management;  Laterality: N/A;  asa    ESOPHAGOGASTRODUODENOSCOPY N/A 02/07/2023    Procedure: EGD (ESOPHAGOGASTRODUODENOSCOPY);  Surgeon: Dougie Shea MD;  Location: [facility];  Service: Endoscopy;  Laterality: N/A;    FOOT FRACTURE SURGERY      HYSTERECTOMY      VAGINAL HYSTERECTOMY WITHOUT BSO - ENDOMETRIOSIS    INJECTION FOR SENTINEL NODE IDENTIFICATION Right 05/09/2022    Procedure: INJECTION, FOR SENTINEL NODE IDENTIFICATION-Right;  Surgeon: NEAL Dhillon MD;  Location: [facility] OR;  Service: General;  Laterality: Right;    INJECTION OF ANESTHETIC AGENT AROUND MEDIAL BRANCH NERVES INNERVATING LUMBAR FACET JOINT N/A 04/11/2019    Procedure: Lumbo-sacral Block, DR5 and Lateral Branches of S1,S2, S3;  Surgeon: Michelle Pineda Jr., MD;  Location: [facility] PAIN MGT;  Service: Pain Management;  Laterality: N/A;  Pt takes  and states she holds ASA on her own whenever she has procedures.  Instructed to hold x 3 days prior to procedure.      INJECTION OF ANESTHETIC AGENT AROUND MEDIAL BRANCH NERVES INNERVATING LUMBAR FACET JOINT Bilateral 05/03/2023    Procedure: Block-nerve-medial branch-lumbar bilateral L3, L4, L5;  Surgeon: Maile Storm DO;  Location: [facility] PAIN MGT;  Service: Pain Management;  Laterality: Bilateral;  asa    INJECTION OF ANESTHETIC AGENT INTO SACROILIAC JOINT Right 08/30/2018    Procedure: BLOCK, SACROILIAC JOINT-Right- ORAL SEDATION;  Surgeon: Michelle Pineda Jr., MD;  Location: [facility] PAIN MGT;  Service: Pain Management;  Laterality: Right;     INJECTION OF ANESTHETIC AGENT INTO SACROILIAC JOINT Bilateral 09/27/2018    Procedure: BLOCK, SACROILIAC JOINT-BILATERAL;  Surgeon: Michelle Pineda Jr., MD;  Location: [facility] PAIN MGT;  Service: Pain Management;  Laterality: Bilateral;  Please keep at 10:00 due to trasnsportation    INJECTION OF ANESTHETIC AGENT INTO SACROILIAC JOINT Bilateral 02/07/2019    Procedure: Bilateral Sacroiliac Joint Injection - Per Dr Pineda, not necessary to hold ASA.;  Surgeon: Michelle Pineda Jr., MD;  Location: [facility] PAIN MGT;  Service: Pain Management;  Laterality: Bilateral;    INTRAOCULAR PROSTHESES INSERTION Right 08/01/2019    Procedure: INSERTION, IOL PROSTHESIS;  Surgeon: Karen Song MD;  Location: [address];  Service: Ophthalmology;  Laterality: Right;    INTRAOCULAR PROSTHESES INSERTION Left 09/26/2019    Procedure: INSERTION, IOL PROSTHESIS;  Surgeon: Karen Song MD;  Location: [address];  Service: Ophthalmology;  Laterality: Left;    JOINT REPLACEMENT Right     knee    KNEE SURGERY      MASTECTOMY Right 05/09/2022    Procedure: MASTECTOMY-Right;  Surgeon: NEAL Dhillon MD;  Location: [facility];  Service: General;  Laterality: Right;  2.5 HOURS    PHACOEMULSIFICATION OF CATARACT Right 08/01/2019    Procedure: PHACOEMULSIFICATION, CATARACT;  Surgeon: Karen Song MD;  Location: [address];  Service: Ophthalmology;  Laterality: Right;    PHACOEMULSIFICATION OF CATARACT Left 09/26/2019    Procedure: PHACOEMULSIFICATION, CATARACT;  Surgeon: Karen Song MD;  Location: [facility] OR [address] [place] [address];  Service: Ophthalmology;  Laterality: Left;    RADIOFREQUENCY ABLATION, NERVE, PERIPHERAL Right 04/24/2024    Procedure: RIGHT L5 Dorsal Ramus and RIGHT Lateral Branches of S1, S2, and S3;  Surgeon: Maile Storm DO;  Location: [facility] PAIN MANAGEMENT;  Service: Pain Management;  Laterality: Right;  30 mins    RADIOFREQUENCY THERMOCOAGULATION Right 05/07/2019    Procedure: RADIOFREQUENCY THERMAL  COAGULATION RIGHT DORSAL RAMUS 5 AND LATERAL BRANCH OF S1, S2 AND S3;  Surgeon: Michelle Pineda Jr., MD;  Location: [facility];  Service: Pain Management;  Laterality: Right;    RADIOFREQUENCY THERMOCOAGULATION Left 05/14/2019    Procedure: RADIOFREQUENCY THERMAL COAGULATION LEFT DORSAL RAMUS 5 AND LATERAL BRANCH OF S1,S2 AND S3;  Surgeon: Michelle Pineda Jr., MD;  Location: [facility];  Service: Pain Management;  Laterality: Left;    RADIOFREQUENCY THERMOCOAGULATION Right 10/22/2019    Procedure: RADIOFREQUENCY THERMAL COAGULATION---DARSAL RAMUS 5 and LATERAL S1,S2,and S3 Right;  Surgeon: Michelle Pineda Jr., MD;  Location: [facility];  Service: Pain Management;  Laterality: Right;  patient to sign consent DOS    RADIOFREQUENCY THERMOCOAGULATION Left 10/29/2019    Procedure: RADIOFREQUENCY THERMAL COAGULATION - LEFT - DR5, S1,S2, AND S3;  Surgeon: Michelle Pineda Jr., MD;  Location: [facility];  Service: Pain Management;  Laterality: Left;    RADIOFREQUENCY THERMOCOAGULATION Left 05/26/2020    Procedure: RADIOFREQUENCY THERMAL COAGULATION--Left DR5+ lateral branches of S1, S2, S3;  Surgeon: Michelle Pineda Jr., MD;  Location: [facility];  Service: Pain Management;  Laterality: Left;    RADIOFREQUENCY THERMOCOAGULATION Right 06/02/2020    Procedure: RADIOFREQUENCY THERMAL COAGULATION--Right DR5+ lateral branches of S1, S2, S3;  Surgeon: Michelle Pineda Jr., MD;  Location: [facility];  Service: Pain Management;  Laterality: Right;    SENTINEL LYMPH NODE BIOPSY Right 05/09/2022    Procedure: BIOPSY, LYMPH NODE, SENTINEL-Right;  Surgeon: NEAL Dhillon MD;  Location: [facility];  Service: General;  Laterality: Right;    SINUS SURGERY      Surgery on right knee  07/09/1982    TONSILLECTOMY      tumor removed from back left side upper shoulder  03/17/2006     Family History   Problem Relation Name Age of Onset    Asthma Mother      Colon cancer Mother      Psoriasis Mother      Cancer Mother           colon    Depression Mother      Cancer Father          lymphoma    Stroke Sister      Diabetes Brother x2     Arthritis Brother x2     Heart disease Brother x2         cad    No Known Problems Daughter      Hypertension Neg Hx      Suicide Neg Hx      Amblyopia Neg Hx      Blindness Neg Hx      Cataracts Neg Hx      Glaucoma Neg Hx      Macular degeneration Neg Hx      Retinal detachment Neg Hx      Strabismus Neg Hx      Eczema Neg Hx      Allergies Neg Hx        Social History     Tobacco Use    Smoking status: Every Day     Current packs/day: 0.00     Average packs/day: 0.3 packs/day for 10.0 years (2.5 ttl pk-yrs)     Types: Cigarettes     Start date: 1/10/2013     Last attempt to quit: 1/10/2023     Years since quittin.3     Passive exposure: Never    Smokeless tobacco: Former    Tobacco comments:     about 5 cig a day   Substance and Sexual Activity    Alcohol use: No    Drug use: No    Sexual activity: Not Currently     Partners: Male     Birth control/protection: Post-menopausal        ROS:   Review of Systems   Constitutional:  Positive for weight loss. Negative for fever and malaise/fatigue.   HENT:  Negative for congestion, ear pain, nosebleeds and sore throat.    Eyes:  Negative for blurred vision, double vision and photophobia.   Respiratory:  Negative for cough, hemoptysis, sputum production, shortness of breath, wheezing and stridor.    Cardiovascular:  Negative for chest pain, palpitations, orthopnea and leg swelling.   Gastrointestinal:  Negative for abdominal pain, blood in stool, constipation, diarrhea, heartburn, melena, nausea and vomiting.   Genitourinary:  Negative for dysuria and hematuria.   Musculoskeletal:  Positive for back pain and joint pain (left hip with recent exacerbation). Negative for myalgias and neck pain.   Skin:  Negative for itching and rash.   Neurological:  Negative for dizziness, focal weakness, seizures, weakness and headaches.   Endo/Heme/Allergies:  Negative  "for polydipsia. Does not bruise/bleed easily.   Psychiatric/Behavioral:  Positive for depression. Negative for memory loss. The patient is nervous/anxious. The patient does not have insomnia.         Objective:     Vitals:    05/21/24 1354   BP: (!) 152/67   Pulse: 69   Resp: 18   Temp: 98 °F (36.7 °C)   TempSrc: Oral   SpO2: 95%   Weight: 46 kg (101 lb 6.6 oz)   Height: 5' 2" (1.575 m)   PainSc:   8   PainLoc: Back       Physical Examination:   Physical Exam  Vitals and nursing note reviewed.   Constitutional:       General: She is not in acute distress.     Appearance: She is not diaphoretic.   HENT:      Head: Normocephalic.   Eyes:      Conjunctiva/sclera: Conjunctivae normal.   Neck:      Thyroid: No thyromegaly.   Cardiovascular:      Rate and Rhythm: Normal rate.   Pulmonary:      Effort: Pulmonary effort is normal.   Chest:      Comments: Hx of right mastectomy, well healed, No skin changes or breast masses noted to left breast, no  lymphadenopathy noted bilaterally    Abdominal:      General: Bowel sounds are normal.      Palpations: Abdomen is soft.   Musculoskeletal:         General: Normal range of motion.      Cervical back: Neck supple.   Skin:     General: Skin is warm and dry.   Neurological:      Mental Status: She is alert and oriented to person, place, and time.      Gait: Gait is intact.   Psychiatric:         Mood and Affect: Affect normal.         Cognition and Memory: Memory normal.         Judgment: Judgment normal.          Diagnostic Tests:  Significant Imaging: I have reviewed and interpreted all pertinent imaging results/findings.    Laboratory Data:  All pertinent labs have been reviewed.    Labs:   Lab Results   Component Value Date    WBC 7.47 02/26/2024    HGB 12.5 02/26/2024    HCT 39.6 02/26/2024    MCV 97 02/26/2024     02/26/2024       Assessment/Plan:   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, estrogen receptor positive  pT1b N0 (i+)  grade 2 ER + [place] - " HER2 - IDC of the right breast.    She is status post mastectomy and sentinel node exam for IDC as well as DCIS of the right breast.     Oncotype returned high risk, which confers a benefit ot the addition of chemotherapy to AI.   RSclin showed an 8% risk of recurrence with 4% absolute benefit of chemotherapy. She has declined chemotherapy.     Tolerating exemestane relatively well. Continue at this time.     Continue Zometa q 6 months for bone health and possible risk reduction for breast cancer,cleared to receive today.   Continue active surveillance. Mammogram scheduled June 2024, will see her back in 6 months, sooner if needed    Osteopenia of multiple sites  Diagnosed with osteopenia/osteoporosis given hip fracture in 2020. Follows up with Dr. Cornelius and is on zoledronic acid, now receiving q 6 months.   Continues on MTX weekly  Labs ok for zometa today  Follow up bone density done 3/2024     Hip pain   Has degenerative changes L3-L5. Declines surgical evaluation at this time. Getting SOPHY with pain management tomorrow    Aromatase inhibitor arthralgias   Continue supportive care. She declined acupuncture referral previously.     Colon Polyps  TONIE  Recent colonoscopy 12/22, path did not show any malignancy.   EGD completed 2/23  Has TONIE again, poor tolerability and response to oral iron. received 2 doses of IV venofer at [place] in December with improvement in counts  Will repeat cbc and iron studies    Weight loss  Stabilizing, further work up as clinically indicated.     Atrial fibrillation   Atherosclerosis of aorta   On aspirin and lipitor, follows up with Cardiology  Continue to follow with pcp    ECOG SCORE    1 - Restricted in strenuous activity-ambulatory and able to carry out work of a light nature       Total time of this visit, including time spent face to face with patient and/or via video/audio, and also in preparing for today's visit for MDM and documentation. (Medical Decision Making, including  consideration of possible diagnoses, management options, complex medical record review, review of diagnostic tests and information, consideration and discussion of significant complications based on comorbidities, and discussion with providers involved with the care of the patient) is 30 minutes. Greater than 50% was spent face to face with the patient counseling and coordinating care.    Cheyanne Smith NP    Discussion:         Med Onc Chart Routing      Follow up with physician    Follow up with LATOYA 6 months.   Infusion scheduling note   zometa in 6 months   Injection scheduling note    Labs CBC, CMP, ferritin and iron and TIBC   Scheduling:  Preferred lab:  Lab interval:  labs 6/3 at [facility] (same day as scheduled mammogram), cbc, cmp again in 6 months   Imaging    Pharmacy appointment    Other referrals

## 2024-05-22 ENCOUNTER — HOSPITAL ENCOUNTER (OUTPATIENT)
Facility: HOSPITAL | Age: 69
Discharge: HOME | End: 2024-05-22
Attending: STUDENT IN AN ORGANIZED HEALTH CARE EDUCATION/TRAINING PROGRAM | Admitting: STUDENT IN AN ORGANIZED HEALTH CARE EDUCATION/TRAINING PROGRAM
Payer: MEDICARE

## 2024-05-22 VITALS
DIASTOLIC BLOOD PRESSURE: 58 MMHG | SYSTOLIC BLOOD PRESSURE: 123 MMHG | HEART RATE: 61 BPM | WEIGHT: 101 LBS | RESPIRATION RATE: 18 BRPM | TEMPERATURE: 99 F | HEIGHT: 60 IN | BODY MASS INDEX: 19.83 KG/M2 | OXYGEN SATURATION: 94 %

## 2024-05-22 DIAGNOSIS — M54.17 LUMBOSACRAL RADICULOPATHY: ICD-10-CM

## 2024-05-22 DIAGNOSIS — G89.29 CHRONIC PAIN: ICD-10-CM

## 2024-05-22 DIAGNOSIS — M51.36 DDD (DEGENERATIVE DISC DISEASE), LUMBAR: Primary | ICD-10-CM

## 2024-05-22 PROCEDURE — 25500020 PHARM REV CODE 255: Performed by: STUDENT IN AN ORGANIZED HEALTH CARE EDUCATION/TRAINING PROGRAM

## 2024-05-22 PROCEDURE — 63600175 PHARM REV CODE 636 W HCPCS: Performed by: STUDENT IN AN ORGANIZED HEALTH CARE EDUCATION/TRAINING PROGRAM

## 2024-05-22 PROCEDURE — 25000003 PHARM REV CODE 250: Performed by: STUDENT IN AN ORGANIZED HEALTH CARE EDUCATION/TRAINING PROGRAM

## 2024-05-22 PROCEDURE — 62323 NJX INTERLAMINAR LMBR/SAC: CPT | Mod: ,,, | Performed by: STUDENT IN AN ORGANIZED HEALTH CARE EDUCATION/TRAINING PROGRAM

## 2024-05-22 PROCEDURE — 62323 NJX INTERLAMINAR LMBR/SAC: CPT | Performed by: STUDENT IN AN ORGANIZED HEALTH CARE EDUCATION/TRAINING PROGRAM

## 2024-05-22 RX ORDER — FENTANYL CITRATE 50 UG/ML
25 INJECTION, SOLUTION INTRAMUSCULAR; INTRAVENOUS
Status: DISCONTINUED | OUTPATIENT
Start: 2024-05-22 | End: 2024-05-22 | Stop reason: HOSPADM

## 2024-05-22 RX ORDER — SODIUM CHLORIDE 9 MG/ML
INJECTION, SOLUTION INTRAVENOUS CONTINUOUS
Status: DISCONTINUED | OUTPATIENT
Start: 2024-05-22 | End: 2024-05-22 | Stop reason: HOSPADM

## 2024-05-22 RX ORDER — MIDAZOLAM HYDROCHLORIDE 1 MG/ML
1 INJECTION, SOLUTION INTRAMUSCULAR; INTRAVENOUS
Status: DISCONTINUED | OUTPATIENT
Start: 2024-05-22 | End: 2024-05-22 | Stop reason: HOSPADM

## 2024-05-22 RX ORDER — DEXAMETHASONE SODIUM PHOSPHATE 10 MG/ML
INJECTION INTRAMUSCULAR; INTRAVENOUS
Status: DISCONTINUED | OUTPATIENT
Start: 2024-05-22 | End: 2024-05-22 | Stop reason: HOSPADM

## 2024-05-22 RX ORDER — LIDOCAINE HYDROCHLORIDE 20 MG/ML
INJECTION, SOLUTION EPIDURAL; INFILTRATION; INTRACAUDAL; PERINEURAL
Status: DISCONTINUED | OUTPATIENT
Start: 2024-05-22 | End: 2024-05-22 | Stop reason: HOSPADM

## 2024-05-22 RX ORDER — LIDOCAINE HYDROCHLORIDE 10 MG/ML
INJECTION, SOLUTION EPIDURAL; INFILTRATION; INTRACAUDAL; PERINEURAL
Status: DISCONTINUED | OUTPATIENT
Start: 2024-05-22 | End: 2024-05-22 | Stop reason: HOSPADM

## 2024-05-22 NOTE — DISCHARGE SUMMARY
Discharge Note  Short Stay      SUMMARY     Admit Date: 5/22/2024    Attending Physician: Maile Storm      Discharge Physician: Maile Storm      Discharge Date: 5/22/2024 9:41 AM    Procedure(s) (LRB):  L5/S1 Interlaminar SOPHY (N/A)    Final Diagnosis: Lumbar radiculopathy [M54.16]  DDD (degenerative disc disease), lumbar [M51.36]  Spinal stenosis of lumbar region with neurogenic claudication [M48.062]    Disposition: Home or self care    Patient Instructions:   Current Discharge Medication List        CONTINUE these medications which have NOT CHANGED    Details   albuterol (PROVENTIL/VENTOLIN HFA) 90 mcg/actuation inhaler USE 2 INHALATIONS BY MOUTH 4  TIMES DAILY AS NEEDED  Qty: 34 g, Refills: 3    Comments: Please send a replace/new response with 90-Day Supply if appropriate to maximize member benefit. Requesting 1 year supply.      amLODIPine (NORVASC) 5 MG tablet Take 1 tablet (5 mg total) by mouth 2 (two) times a day.  Qty: 180 tablet, Refills: 1    Comments: .  Associated Diagnoses: Essential hypertension      atorvastatin (LIPITOR) 80 MG tablet TAKE 1 TABLET(80 MG) BY MOUTH EVERY DAY  Qty: 90 tablet, Refills: 0    Associated Diagnoses: Hyperlipidemia, unspecified hyperlipidemia type      baclofen (LIORESAL) 10 MG tablet 1 tab po tid for muscle cramps.  Qty: 60 tablet, Refills: 1      benztropine (COGENTIN) 0.5 MG tablet TAKE 1 TABLET(0.5 MG) BY MOUTH TWICE DAILY  Qty: 180 tablet, Refills: 3    Associated Diagnoses: Extrapyramidal symptom      cloNIDine (CATAPRES) 0.1 MG tablet 1 tab po q day for systolic BP > 160 or DBP >100.  Qty: 30 tablet, Refills: 1    Comments: .      exemestane (AROMASIN) 25 mg tablet Take 1 tablet (25 mg total) by mouth once daily.  Qty: 30 tablet, Refills: 11    Associated Diagnoses: 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, estrogen receptor positive      folic acid (FOLVITE) 1 MG tablet Take 1 tablet (1,000 mcg total) by mouth once daily.  Qty: 90  tablet, Refills: 3    Comments: Requesting 1 year supply  Associated Diagnoses: Psoriatic arthritis      gabapentin (NEURONTIN) 100 MG capsule Take 1 capsule (100 mg total) by mouth 3 (three) times daily.  Qty: 270 capsule, Refills: 3    Associated Diagnoses: Anxiety      HYDROcodone-acetaminophen (NORCO) 5-325 mg per tablet Take 1 tablet by mouth every 8 (eight) hours as needed for Pain.  Qty: 30 tablet, Refills: 0    Comments: Quantity prescribed more than 7 day supply? Yes, quantity medically necessary      isosorbide mononitrate (IMDUR) 30 MG 24 hr tablet Take 1 tablet (30 mg total) by mouth once daily.  Qty: 30 tablet, Refills: 3      losartan (COZAAR) 25 MG tablet TAKE 1 TABLET EVERY DAY AS NEEDED FOR.WHEN SYSTOLIC BLOOD PRESSURE ABOVE 150  Qty: 90 tablet, Refills: 1    Comments: **Patient requests 90 days supply** - .  Associated Diagnoses: Essential hypertension      omeprazole (PRILOSEC) 40 MG capsule Take 1 capsule (40 mg total) by mouth every morning.  Qty: 90 capsule, Refills: 3    Associated Diagnoses: Gastroesophageal reflux disease, unspecified whether esophagitis present      !! risperiDONE (RISPERDAL) 2 MG tablet TAKE 1 TABLET(2 MG) BY MOUTH EVERY MORNING  Qty: 90 tablet, Refills: 3    Associated Diagnoses: Paranoid schizophrenia      !! risperiDONE (RISPERDAL) 4 MG tablet TAKE 1 TABLET(4 MG) BY MOUTH EVERY EVENING  Qty: 90 tablet, Refills: 3    Associated Diagnoses: Paranoid schizophrenia      zinc gluconate 50 mg tablet Take 50 mg by mouth once daily.      albuterol (PROVENTIL) 2.5 mg /3 mL (0.083 %) nebulizer solution Take 3 mLs (2.5 mg total) by nebulization every 6 (six) hours as needed for Wheezing. Rescue  Qty: 90 each, Refills: 2    Associated Diagnoses: Chronic bronchitis, unspecified chronic bronchitis type      ascorbic acid, vitamin C, (VITAMIN C) 500 MG tablet Take 500 mg by mouth once daily.      aspirin (ECOTRIN) 81 MG EC tablet Take 81 mg by mouth once daily.       budesonide-formoterol 80-4.5 mcg (SYMBICORT) 80-4.5 mcg/actuation HFAA Inhale 2 puffs into the lungs 2 (two) times a day.  Qty: 6.9 g, Refills: 11      ciclopirox (PENLAC) 8 % Soln Apply topically nightly. Paint on affected nail(s) once per night, remove residue once per week with alcohol  Qty: 6.6 mL, Refills: 3    Associated Diagnoses: Tinea unguium      fluticasone (VERAMYST) 27.5 mcg/actuation nasal spray 2 sprays by Nasal route as needed for Rhinitis.      fluticasone propionate (FLONASE) 50 mcg/actuation nasal spray 1 spray by Each Nostril route once daily.      methotrexate 2.5 MG Tab TAKE 8 TABLETS BY MOUTH EVERY 7 DAYS. TAKE 4 TABLETS MONDAY MORNING AND TAKE 4 TABLETS MOND NIGHT ONLY  Qty: 144 tablet, Refills: 0    Comments: **Patient requests 90 days supply**  Associated Diagnoses: Psoriatic arthritis      montelukast (SINGULAIR) 10 mg tablet Take 1 tablet (10 mg total) by mouth every evening.  Qty: 30 tablet, Refills: 0    Associated Diagnoses: Upper airway cough syndrome; Cough, unspecified type      omega-3 fatty acids/fish oil (FISH OIL-OMEGA-3 FATTY ACIDS) 300-1,000 mg capsule Take by mouth once daily.      venlafaxine (EFFEXOR-XR) 75 MG 24 hr capsule Take 1 capsule (75 mg total) by mouth once daily.  Qty: 90 capsule, Refills: 3    Comments: Patient is now taking 75mg daily.  Please cancel all other prescriptions.  Associated Diagnoses: Anxiety; Recurrent major depressive disorder, in full remission      vitamin D (VITAMIN D3) 1000 units Tab Take 1,000 Units by mouth once daily.      vitamin E 200 UNIT capsule Take 200 Units by mouth once daily.       !! - Potential duplicate medications found. Please discuss with provider.              Discharge Diagnosis: Lumbar radiculopathy [M54.16]  DDD (degenerative disc disease), lumbar [M51.36]  Spinal stenosis of lumbar region with neurogenic claudication [M48.062]  Condition on Discharge: Stable with no complications to procedure   Diet on Discharge: Same  as before.  Activity: as per instruction sheet.  Discharge to: Home with a responsible adult.  Follow up: 2-4 weeks       Please call my office or pager at [phone] if experienced any weakness or loss of sensation, fever > 101.5, pain uncontrolled with oral medications, persistent nausea/vomiting/or diarrhea, redness or drainage from the incisions, or any other worrisome concerns. If physician on call was not reached or could not communicate with our office for any reason please go to the nearest emergency department

## 2024-05-22 NOTE — PLAN OF CARE
Chart reviewed. Preop nursing care completed per orders. Safe surgery checklist complete. Pt denies any open wounds cuts or sores. Pt denies any metal in body. Belongings on stretcher. Pt AAOX4, VSS on room air. Pt toileted, Bed locked in lowest position, Call light within reach. Pt denies any needs at this time.

## 2024-05-22 NOTE — OP NOTE
Lumbar Interlaminar Epidural Steroid Injection under Fluoroscopic Guidance    The procedure, risks, benefits, and options were discussed with the patient. There are no contraindications to the procedure. The patent expressed understanding and agreed to the procedure. Informed written consent was obtained prior to the start of the procedure and can be found in the patient's chart.    PATIENT NAME: Bhavna Landry   MRN: [id number]     DATE OF PROCEDURE: 05/22/2024    PROCEDURE: Lumbar Interlaminar Epidural Steroid Injection L5/S1 under Fluoroscopic Guidance    PRE-OP DIAGNOSIS: Lumbar radiculopathy [M54.16]  DDD (degenerative disc disease), lumbar [M51.36]  Spinal stenosis of lumbar region with neurogenic claudication [M48.062] Lumbar radiculopathy [M54.16]    POST-OP DIAGNOSIS: Same    PHYSICIAN: Maile Storm DO    ASSISTANTS: None     MEDICATIONS INJECTED: Preservative-free Decadron 10mg with 4cc of Lidocaine 1% MPF and preservative free normal saline    LOCAL ANESTHETIC INJECTED: Xylocaine 2%     SEDATION: Versed 2mg and Fentanyl 50mcg                                                                                                                                                                                     Conscious sedation ordered by M.D. Patient re-evaluation prior to administration of conscious sedation. No changes noted in patient's status from initial evaluation. The patient's vital signs were monitored by RN and patient remained hemodynamically stable throughout the procedure.    Event Time In   Sedation Start 0935       ESTIMATED BLOOD LOSS: None    COMPLICATIONS: None    TECHNIQUE: Time-out was performed to identify the patient and procedure to be performed. With the patient laying in a prone position, the surgical area was prepped and draped in the usual sterile fashion using ChloraPrep and a fenestrated drape. The level was determined under fluoroscopy guidance. Skin anesthesia was achieved by  injecting Lidocaine 2% over the injection site. The interlaminar space was then approached with a 18 gauge,  3.5 inch Tuohy needle that was introduced under fluoroscopic guidance in the AP, lateral and/or contralateral oblique imaging. Once the Ligamentum flavum was encountered loss of resistance to saline was used to enter the epidural space. With positive loss of resistance and negative aspiration for CSF or Blood, contrast dye Omnipaque (300mg/mL) was injected to confirm placement and there was no vascular runoff. 5 mL of the medication mixture listed above was injected slowly. Displacement of the radio opaque contrast after injection of the medication confirmed that the medication went into the area of the epidural space. The needles were removed and bleeding was nil. A sterile dressing was applied. No specimens collected. The patient tolerated the procedure well.       The patient was monitored after the procedure in the recovery area. They were given post-procedure and discharge instructions to follow at home. The patient was discharged in a stable condition.      Maile Storm DO

## 2024-05-22 NOTE — DISCHARGE INSTRUCTIONS
Home Care Instructions Pain Management:     1.  DIET:     You may resume your normal diet today.     2.  BATHING:     You may shower with luke warm water.     3.  DRESSING:     You may remove your bandage today.     4.  ACTIVITY LEVEL:       You may resume your normal activities 24 hours after your procedure.     5.  MEDICATIONS:     You may resume your normal medications today.     6.  SPECIAL INSTRUCTIONS:     No heat to the injection site for 24 hours including bath or shower, heating pad, moist heat or hot tubs.     Use an ice pack to the injection site for any pain or discomfort.  Apply ice packs for 20 minute intervals as needed.     If you have received any sedatives by mouth today, you can not drive for 12 hours.     If you have received sedation through an IV, you can not drive for 24 hours.     PLEASE CALL YOUR DOCTOR FOR THE FOLLOWIN.  Redness or swelling around the injection site.  2.  Fever of 101 degrees.  3.  Drainage (pus) from the injection site.  4.  For any continuous bleeding (some dried blood over the incision is normal.)     FOR EMERGENCIES:     If any unusual problems or difficulties occur during clinic hours, call [phone] or dial [phone].     Follow up with with your physician in 2-3 weeks.

## 2024-05-23 DIAGNOSIS — K21.9 GASTROESOPHAGEAL REFLUX DISEASE, UNSPECIFIED WHETHER ESOPHAGITIS PRESENT: ICD-10-CM

## 2024-05-23 DIAGNOSIS — K31.89 EROSIVE GASTROPATHY: ICD-10-CM

## 2024-05-23 RX ORDER — OMEPRAZOLE 20 MG/1
20 CAPSULE, DELAYED RELEASE ORAL
Qty: 30 CAPSULE | Refills: 11 | Status: SHIPPED | OUTPATIENT
Start: 2024-05-23

## 2024-05-24 ENCOUNTER — OFFICE VISIT (OUTPATIENT)
Dept: ALLERGY | Facility: CLINIC | Age: 69
End: 2024-05-24
Payer: MEDICARE

## 2024-05-24 VITALS
SYSTOLIC BLOOD PRESSURE: 136 MMHG | BODY MASS INDEX: 19.56 KG/M2 | OXYGEN SATURATION: 97 % | WEIGHT: 99.63 LBS | DIASTOLIC BLOOD PRESSURE: 67 MMHG | HEART RATE: 78 BPM | HEIGHT: 60 IN

## 2024-05-24 DIAGNOSIS — J31.0 RHINITIS, UNSPECIFIED TYPE: ICD-10-CM

## 2024-05-24 DIAGNOSIS — D80.1 HYPOGAMMAGLOBULINEMIA: ICD-10-CM

## 2024-05-24 DIAGNOSIS — R76.8 WEAK ANTIBODY RESPONSE TO PNEUMOCOCCAL VACCINE: ICD-10-CM

## 2024-05-24 DIAGNOSIS — Z86.19 FREQUENT INFECTIONS: Primary | ICD-10-CM

## 2024-05-24 DIAGNOSIS — R06.2 WHEEZING: ICD-10-CM

## 2024-05-24 PROCEDURE — 1159F MED LIST DOCD IN RCRD: CPT | Mod: CPTII,S$GLB,, | Performed by: STUDENT IN AN ORGANIZED HEALTH CARE EDUCATION/TRAINING PROGRAM

## 2024-05-24 PROCEDURE — 4010F ACE/ARB THERAPY RXD/TAKEN: CPT | Mod: CPTII,S$GLB,, | Performed by: STUDENT IN AN ORGANIZED HEALTH CARE EDUCATION/TRAINING PROGRAM

## 2024-05-24 PROCEDURE — 90732 PPSV23 VACC 2 YRS+ SUBQ/IM: CPT | Mod: S$GLB,,, | Performed by: STUDENT IN AN ORGANIZED HEALTH CARE EDUCATION/TRAINING PROGRAM

## 2024-05-24 PROCEDURE — 3288F FALL RISK ASSESSMENT DOCD: CPT | Mod: CPTII,S$GLB,, | Performed by: STUDENT IN AN ORGANIZED HEALTH CARE EDUCATION/TRAINING PROGRAM

## 2024-05-24 PROCEDURE — 99215 OFFICE O/P EST HI 40 MIN: CPT | Mod: 25,S$GLB,, | Performed by: STUDENT IN AN ORGANIZED HEALTH CARE EDUCATION/TRAINING PROGRAM

## 2024-05-24 PROCEDURE — 3078F DIAST BP <80 MM HG: CPT | Mod: CPTII,S$GLB,, | Performed by: STUDENT IN AN ORGANIZED HEALTH CARE EDUCATION/TRAINING PROGRAM

## 2024-05-24 PROCEDURE — 1126F AMNT PAIN NOTED NONE PRSNT: CPT | Mod: CPTII,S$GLB,, | Performed by: STUDENT IN AN ORGANIZED HEALTH CARE EDUCATION/TRAINING PROGRAM

## 2024-05-24 PROCEDURE — 99999 PR PBB SHADOW E&M-EST. PATIENT-LVL V: CPT | Mod: PBBFAC,,, | Performed by: STUDENT IN AN ORGANIZED HEALTH CARE EDUCATION/TRAINING PROGRAM

## 2024-05-24 PROCEDURE — G0009 ADMIN PNEUMOCOCCAL VACCINE: HCPCS | Mod: S$GLB,,, | Performed by: STUDENT IN AN ORGANIZED HEALTH CARE EDUCATION/TRAINING PROGRAM

## 2024-05-24 PROCEDURE — 1160F RVW MEDS BY RX/DR IN RCRD: CPT | Mod: CPTII,S$GLB,, | Performed by: STUDENT IN AN ORGANIZED HEALTH CARE EDUCATION/TRAINING PROGRAM

## 2024-05-24 PROCEDURE — 1101F PT FALLS ASSESS-DOCD LE1/YR: CPT | Mod: CPTII,S$GLB,, | Performed by: STUDENT IN AN ORGANIZED HEALTH CARE EDUCATION/TRAINING PROGRAM

## 2024-05-24 PROCEDURE — 3075F SYST BP GE 130 - 139MM HG: CPT | Mod: CPTII,S$GLB,, | Performed by: STUDENT IN AN ORGANIZED HEALTH CARE EDUCATION/TRAINING PROGRAM

## 2024-05-24 PROCEDURE — 3008F BODY MASS INDEX DOCD: CPT | Mod: CPTII,S$GLB,, | Performed by: STUDENT IN AN ORGANIZED HEALTH CARE EDUCATION/TRAINING PROGRAM

## 2024-05-24 NOTE — PROGRESS NOTES
Educated patient on Pneumovax 23 injection, verbalized understanding, answered all questions. After obtaining consent, and per orders of Dr. Benítez, injection of Pneumovax 23 was given in left arm at patient request, patient tolerated well. Patient instructed to remain in clinic for 15 minutes afterwards, and to report any adverse reaction to me immediately. No adverse reaction noted, denies pain, no complaints voiced. Scheduled follow up labs. Discharged home, safety measures maintained.

## 2024-05-24 NOTE — PROGRESS NOTES
ALLERGY & IMMUNOLOGY CLINIC - FOLLOW UP     HISTORY OF PRESENT ILLNESS     Patient ID: Bhavna Landry is a 68 y.o. female    CC: recurrent sinusitis, hypogammaglobulinemia, weak ab response to prevnar, rhinitis     HPI: Bhavna Landry is a 68 y.o. female with a history of breast cancer (diagnosed 2022), HTN, psoriatic arthritis, OA, osteoporosis, wheezing and shortness of breath, following up for recurrent sinusitis, hypogammaglobulinemia, weak ab response to prevnar, and rhinitis.     She says she hasn't needed antibiotics since our last visit.   Feels her sinus symptoms have been acting up for about a week, but not to the point of needing antibiotics. Feels stopped up. Has been having green and clear mucus. She says she just restarted her flonase. She is using 1-2 SEN nightly.  She isn't using saline sinus rinses.   She last required antibiotics in Feb. She says she doesn't feel like she is doing better, but that this is a normal interval in between episodes of infection.   She says no shortness of breath. She says she can get the wheezing when she lays down, almost every night. She is using her albuterol mostly for it, which helps. She says she uses the symbicort every now and then, but not much.      MEDICAL HISTORY     Vaccines:    Immunization History   Administered Date(s) Administered    COVID-19 MRNA, LN-S PF (MODERNA HALF 0.25 ML DOSE) 04/01/2022    COVID-19, MRNA, LN-S, PF (MODERNA FULL 0.5 ML DOSE) 02/19/2021, 03/19/2021, 09/24/2021    Influenza 01/14/2014, 09/16/2016    Influenza (FLUAD) - Quadrivalent - Adjuvanted - PF *Preferred* (65+) 11/17/2021    Influenza - High Dose - PF (65 years and older) 10/06/2018, 10/24/2019    Influenza - Quadrivalent 10/20/2015    Influenza - Quadrivalent - High Dose - PF (65 years and older) 10/03/2020, 09/23/2022, 09/14/2023    Influenza - Quadrivalent - PF *Preferred* (6 months and older) 12/11/2014, 10/30/2017    Influenza - Trivalent (ADULT) 01/14/2014    Influenza -  Trivalent - PF (ADULT) 09/16/2016    Influenza Split 01/14/2014    Pneumococcal Conjugate - 13 Valent 08/15/2018    Pneumococcal Conjugate - 20 Valent 03/06/2024    Pneumococcal Polysaccharide - 23 Valent 01/14/2014, 11/26/2019, 10/03/2020    RSVpreF (Arexvy) 09/14/2023    Tdap 04/18/2007, 07/17/2017    Zoster 12/28/2015, 07/25/2016    Zoster Recombinant 10/12/2020, 01/02/2021     Medical Hx:   Patient Active Problem List   Diagnosis    PSA (psoriatic arthritis)    Low back pain    Essential hypertension    Hyperlipidemia    Paranoid schizophrenia    Insomnia    Extrapyramidal syndrome    Migraine without aura and without status migrainosus, not intractable    Duane's syndrome of right eye    Posterior vitreous detachment, both eyes    Retinal hemorrhage, left    Retinal tear    Fibromyalgia    History of nickel allergy    Internal derangement of right knee    S/P R knee surgery, TKA revision    Pain of right tibia    Brow ptosis    Sacroiliac joint dysfunction    Chronic pain    Myofascial pain syndrome    Restless legs    Osteoporosis, post-menopausal    Senile nuclear sclerosis    Venous incompetence    Bilateral sacroiliitis    PAF (paroxysmal atrial fibrillation)    Bilateral carotid artery disease    Chronic anticoagulation    Left groin pain    Left hip pain    Mucopurulent chronic bronchitis    Vestibular disequilibrium, left    Decreased ROM of neck    Greater trochanteric bursitis of left hip    Depression    Rheumatoid arthritis    Mild cognitive impairment with memory loss    Atherosclerosis of aorta    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, estrogen receptor positive    Osteopenia of multiple sites    Use of anastrozole    Early dry stage nonexudative age-related macular degeneration of both eyes    Aromatase inhibitor-associated arthralgia    Drug-induced immunodeficiency    Iron deficiency anemia due to chronic blood loss    Decreased functional mobility    Decreased range of  motion of lumbar spine    Secondary and unspecified malignant neoplasm of axilla and upper limb lymph nodes    Leg edema, left    Gastroesophageal reflux disease    Injury of left foot    RASHIDA (generalized anxiety disorder)    Cervical spondylosis    Lumbar spondylosis    Decreased strength    Cough     Surgical Hx:   Past Surgical History:   Procedure Laterality Date    brow ptosis repair Right 2019    Surgeon: Dr. Karla Henson    CATARACT EXTRACTION       SECTION      COLONOSCOPY N/A 2016    Procedure: COLONOSCOPY;  Surgeon: ANDRIA Connelly MD;  Location: [address]);  Service: Endoscopy;  Laterality: N/A;    COLONOSCOPY N/A 2022    Procedure: COLONOSCOPY;  Surgeon: Mikey Walters MD;  Location: [facility];  Service: Endoscopy;  Laterality: N/A;    cyst removed from right sinus  1982    EPIDURAL STEROID INJECTION INTO CERVICAL SPINE N/A 2023    Procedure: C6-7 SOPHY;  Surgeon: Spring Jensen MD;  Location: [facility];  Service: Pain Management;  Laterality: N/A;  20 mins    EPIDURAL STEROID INJECTION INTO LUMBAR SPINE N/A 2023    Procedure: Injection-steroid-epidural-lumbar L5-S1;  Surgeon: Chirag Kim MD;  Location: [facility];  Service: Pain Management;  Laterality: N/A;  asa    EPIDURAL STEROID INJECTION INTO LUMBAR SPINE N/A 2024    Procedure: L5/S1 Interlaminar SOPHY;  Surgeon: Maile Storm DO;  Location: [facility];  Service: Pain Management;  Laterality: N/A;  20mins-ASA 5d    ESOPHAGOGASTRODUODENOSCOPY N/A 2023    Procedure: EGD (ESOPHAGOGASTRODUODENOSCOPY);  Surgeon: Dougie Shea MD;  Location: [facility];  Service: Endoscopy;  Laterality: N/A;    FOOT FRACTURE SURGERY      HYSTERECTOMY      VAGINAL HYSTERECTOMY WITHOUT BSO - ENDOMETRIOSIS    INJECTION FOR SENTINEL NODE IDENTIFICATION Right 2022    Procedure: INJECTION, FOR SENTINEL NODE IDENTIFICATION-Right;  Surgeon: NEAL Dhillon MD;  Location:  [facility] OR;  Service: General;  Laterality: Right;    INJECTION OF ANESTHETIC AGENT AROUND MEDIAL BRANCH NERVES INNERVATING LUMBAR FACET JOINT N/A 04/11/2019    Procedure: Lumbo-sacral Block, DR5 and Lateral Branches of S1,S2, S3;  Surgeon: Michelle Pineda Jr., MD;  Location: [facility] PAIN MGT;  Service: Pain Management;  Laterality: N/A;  Pt takes  and states she holds ASA on her own whenever she has procedures.  Instructed to hold x 3 days prior to procedure.      INJECTION OF ANESTHETIC AGENT AROUND MEDIAL BRANCH NERVES INNERVATING LUMBAR FACET JOINT Bilateral 05/03/2023    Procedure: Block-nerve-medial branch-lumbar bilateral L3, L4, L5;  Surgeon: Maile Storm DO;  Location: [facility] PAIN [facility]GT;  Service: Pain Management;  Laterality: Bilateral;  asa    INJECTION OF ANESTHETIC AGENT INTO SACROILIAC JOINT Right 08/30/2018    Procedure: BLOCK, SACROILIAC JOINT-Right- ORAL SEDATION;  Surgeon: Michelle Pineda Jr., MD;  Location: [facility] PAIN MGT;  Service: Pain Management;  Laterality: Right;    INJECTION OF ANESTHETIC AGENT INTO SACROILIAC JOINT Bilateral 09/27/2018    Procedure: BLOCK, SACROILIAC JOINT-BILATERAL;  Surgeon: Micehlle Pineda Jr., MD;  [place] [facility] [place] [facility]  Service: Pain Management;  Laterality: Bilateral;  Please keep at 10:00 due to trasnsportation    INJECTION OF ANESTHETIC AGENT INTO SACROILIAC JOINT Bilateral 02/07/2019    Procedure: Bilateral Sacroiliac Joint Injection - Per Dr Pineda, not necessary to hold ASA.;  Surgeon: Michelle Pineda Jr., MD;  Location: [facility] PAIN MGT;  Service: Pain Management;  Laterality: Bilateral;    INTRAOCULAR PROSTHESES INSERTION Right 08/01/2019    Procedure: INSERTION, IOL PROSTHESIS;  Surgeon: Karen Song MD;  Location: [facility] OR [address] [place] [address];  Service: Ophthalmology;  Laterality: Right;    INTRAOCULAR PROSTHESES INSERTION Left 09/26/2019    Procedure: INSERTION, IOL PROSTHESIS;  Surgeon: Karen Song MD;  [facility];  Service:  Ophthalmology;  Laterality: Left;    JOINT REPLACEMENT Right     knee    KNEE SURGERY      MASTECTOMY Right 05/09/2022    Procedure: MASTECTOMY-Right;  Surgeon: NEAL Dhillon MD;  Location: [facility] OR;  Service: General;  Laterality: Right;  2.5 HOURS    PHACOEMULSIFICATION OF CATARACT Right 08/01/2019    Procedure: PHACOEMULSIFICATION, CATARACT;  Surgeon: Karen Song MD;  Location: [facility] OR [address] [place] [address];  Service: Ophthalmology;  Laterality: Right;    PHACOEMULSIFICATION OF CATARACT Left 09/26/2019    Procedure: PHACOEMULSIFICATION, CATARACT;  Surgeon: Karen Song MD;  Location: [facility] OR [address] [place] [address];  Service: Ophthalmology;  Laterality: Left;    RADIOFREQUENCY ABLATION, NERVE, PERIPHERAL Right 04/24/2024    Procedure: RIGHT L5 Dorsal Ramus and RIGHT Lateral Branches of S1, S2, and S3;  Surgeon: Maile Storm DO;  Location: [facility]IN [facility];  Service: Pain Management;  Laterality: Right;  30 mins    RADIOFREQUENCY THERMOCOAGULATION Right 05/07/2019    Procedure: RADIOFREQUENCY THERMAL COAGULATION RIGHT DORSAL RAMUS 5 AND LATERAL BRANCH OF S1, S2 AND S3;  Surgeon: Michelle Pineda Jr., MD;  Location: [facility];  Service: Pain Management;  Laterality: Right;    RADIOFREQUENCY THERMOCOAGULATION Left 05/14/2019    Procedure: RADIOFREQUENCY THERMAL COAGULATION LEFT DORSAL RAMUS 5 AND LATERAL BRANCH OF S1,S2 AND S3;  Surgeon: Michelle Pineda Jr., MD;  Location: [facility];  Service: Pain Management;  Laterality: Left;    RADIOFREQUENCY THERMOCOAGULATION Right 10/22/2019    Procedure: RADIOFREQUENCY THERMAL COAGULATION---DARSAL RAMUS 5 and LATERAL S1,S2,and S3 Right;  Surgeon: Michelle Pineda Jr., MD;  Location: [facility];  Service: Pain Management;  Laterality: Right;  patient to sign consent DOS    RADIOFREQUENCY THERMOCOAGULATION Left 10/29/2019    Procedure: RADIOFREQUENCY THERMAL COAGULATION - LEFT - DR5, S1,S2, AND S3;  Surgeon: Michelle Pineda Jr., MD;  Location: [facility];  Service:  Pain Management;  Laterality: Left;    RADIOFREQUENCY THERMOCOAGULATION Left 05/26/2020    Procedure: RADIOFREQUENCY THERMAL COAGULATION--Left DR5+ lateral branches of S1, S2, S3;  Surgeon: Michelle Pineda Jr., MD;  Location: [facility];  Service: Pain Management;  Laterality: Left;    RADIOFREQUENCY THERMOCOAGULATION Right 06/02/2020    Procedure: RADIOFREQUENCY THERMAL COAGULATION--Right DR5+ lateral branches of S1, S2, S3;  Surgeon: Michelle Pineda Jr., MD;  Location: [facility];  Service: Pain Management;  Laterality: Right;    SENTINEL LYMPH NODE BIOPSY Right 05/09/2022    Procedure: BIOPSY, LYMPH NODE, SENTINEL-Right;  Surgeon: NEAL Dhillon MD;  [facility];  Service: General;  Laterality: Right;    SINUS SURGERY      Surgery on right knee  07/09/1982    TONSILLECTOMY      tumor removed from back left side upper shoulder  03/17/2006     Medications:   Current Outpatient Medications on File Prior to Visit   Medication Sig Dispense Refill    albuterol (PROVENTIL) 2.5 mg /3 mL (0.083 %) nebulizer solution Take 3 mLs (2.5 mg total) by nebulization every 6 (six) hours as needed for Wheezing. Rescue 90 each 2    albuterol (PROVENTIL/VENTOLIN HFA) 90 mcg/actuation inhaler USE 2 INHALATIONS BY MOUTH 4  TIMES DAILY AS NEEDED 34 g 3    amLODIPine (NORVASC) 5 MG tablet Take 1 tablet (5 mg total) by mouth 2 (two) times a day. 180 tablet 1    ascorbic acid, vitamin C, (VITAMIN C) 500 MG tablet Take 500 mg by mouth once daily.      aspirin (ECOTRIN) 81 MG EC tablet Take 81 mg by mouth once daily.      atorvastatin (LIPITOR) 80 MG tablet TAKE 1 TABLET(80 MG) BY MOUTH EVERY DAY 90 tablet 0    baclofen (LIORESAL) 10 MG tablet 1 tab po tid for muscle cramps. 60 tablet 1    benztropine (COGENTIN) 0.5 MG tablet TAKE 1 TABLET(0.5 MG) BY MOUTH TWICE DAILY 180 tablet 3    budesonide-formoterol 80-4.5 mcg (SYMBICORT) 80-4.5 mcg/actuation HFAA Inhale 2 puffs into the lungs 2 (two) times a day. 6.9 g 11    ciclopirox  (PENLAC) 8 % Soln Apply topically nightly. Paint on affected nail(s) once per night, remove residue once per week with alcohol 6.6 mL 3    cloNIDine (CATAPRES) 0.1 MG tablet 1 tab po q day for systolic BP > 160 or DBP >100. 30 tablet 1    exemestane (AROMASIN) 25 mg tablet Take 1 tablet (25 mg total) by mouth once daily. 30 tablet 11    fluticasone (VERAMYST) 27.5 mcg/actuation nasal spray 2 sprays by Nasal route as needed for Rhinitis.      fluticasone propionate (FLONASE) 50 mcg/actuation nasal spray 1 spray by Each Nostril route once daily.      folic acid (FOLVITE) 1 MG tablet Take 1 tablet (1,000 mcg total) by mouth once daily. 90 tablet 3    gabapentin (NEURONTIN) 100 MG capsule Take 1 capsule (100 mg total) by mouth 3 (three) times daily. 270 capsule 3    HYDROcodone-acetaminophen (NORCO) 5-325 mg per tablet Take 1 tablet by mouth every 8 (eight) hours as needed for Pain. 30 tablet 0    isosorbide mononitrate (IMDUR) 30 MG 24 hr tablet Take 1 tablet (30 mg total) by mouth once daily. 30 tablet 3    losartan (COZAAR) 25 MG tablet TAKE 1 TABLET EVERY DAY AS NEEDED FOR.WHEN SYSTOLIC BLOOD PRESSURE ABOVE 150 90 tablet 1    methotrexate 2.5 MG Tab TAKE 8 TABLETS BY MOUTH EVERY 7 DAYS. TAKE 4 TABLETS MONDAY MORNING AND TAKE 4 TABLETS MOND NIGHT ONLY 144 tablet 0    montelukast (SINGULAIR) 10 mg tablet Take 1 tablet (10 mg total) by mouth every evening. 30 tablet 0    omega-3 fatty acids/fish oil (FISH OIL-OMEGA-3 FATTY ACIDS) 300-1,000 mg capsule Take by mouth once daily.      omeprazole (PRILOSEC) 20 MG capsule TAKE 1 CAPSULE(20 MG) BY MOUTH EVERY DAY 30 capsule 11    risperiDONE (RISPERDAL) 2 MG tablet TAKE 1 TABLET(2 MG) BY MOUTH EVERY MORNING 90 tablet 3    risperiDONE (RISPERDAL) 4 MG tablet TAKE 1 TABLET(4 MG) BY MOUTH EVERY EVENING 90 tablet 3    venlafaxine (EFFEXOR-XR) 75 MG 24 hr capsule Take 1 capsule (75 mg total) by mouth once daily. 90 capsule 3    vitamin D (VITAMIN D3) 1000 units Tab Take 1,000  Units by mouth once daily.      vitamin E 200 UNIT capsule Take 200 Units by mouth once daily.      zinc gluconate 50 mg tablet Take 50 mg by mouth once daily.      omeprazole (PRILOSEC) 40 MG capsule Take 1 capsule (40 mg total) by mouth every morning. 90 capsule 3     Current Facility-Administered Medications on File Prior to Visit   Medication Dose Route Frequency Provider Last Rate Last Admin    tropicamide 1% ophthalmic solution 1 drop  1 drop Right Eye On Call Procedure Karen Song MD   1 drop at [phone]     Drug Allergies:   Review of patient's allergies indicates:   Allergen Reactions    Etanercept Other (See Comments)     Other reaction(s): recurrent infections    Chloramphenicol sod succinate Hives    Codeine Other (See Comments)     Other reaction(s): Stomach upset. Pt states OK with Percocet    Nickel sutures [surgical stainless steel] Dermatitis     Allergic contact dermatitis    Adhesive Rash     Social Hx:   Social History     Tobacco Use    Smoking status: Every Day     Current packs/day: 0.00     Average packs/day: 0.3 packs/day for 10.0 years (2.5 ttl pk-yrs)     Types: Cigarettes     Start date: 1/10/2013     Last attempt to quit: 1/10/2023     Years since quittin.3     Passive exposure: Never    Smokeless tobacco: Former    Tobacco comments:     about 5 cig a day   Substance Use Topics    Alcohol use: No    Drug use: No     Additional History Obtained at Initial Visit:  H/o Asthma: as a child.   H/o Rhinitis: endorses  Env/Occ:   Pets: dog and cat. She thinks the cat might trigger symptoms.  Social Hx:   She started smoking in her 30's. She smokes about 5 cigarettes per day.  Family Hx:   Asthma: mother  Immune deficiency: unknown      PHYSICAL EXAM     VS: /67 (BP Location: Left arm, Patient Position: Sitting, BP Method: Medium (Automatic))   Pulse 78   Ht 5' (1.524 m)   Wt 45.2 kg (99 lb 10.4 oz)   SpO2 97%   BMI 19.46 kg/m²   GENERAL: Alert, NAD,  well-appearing  EYES: EOMI, no conjunctival injection, no discharge, no infraorbital shiners  NOSE: NT 2+ B/L, no stringing mucus, no polyps visualized  ORAL: MMM, no ulcers, no thrush  LUNGS: CTAB, no w/r/c, no increased WOB  HEART: RRR, normal S1/S2, no m/g/r  EXTREMITIES: No LE edema  DERM: no rashes  NEURO: normal speech, no facial asymmetry     LABORATORY STUDIES     Component      Latest Ref Rng 2/26/2024 3/11/2024 4/30/2024 5/21/2024   WBC      3.90 - 12.70 K/uL 7.47       RBC      4.00 - 5.40 M/uL 4.08       Hemoglobin      12.0 - 16.0 g/dL 12.5       Hematocrit      37.0 - 48.5 % 39.6       MCV      82 - 98 fL 97       MCH      27.0 - 31.0 pg 30.6       MCHC      32.0 - 36.0 g/dL 31.6 (L)       RDW      11.5 - 14.5 % 18.0 (H)       Platelet Count      150 - 450 K/uL 329       MPV      9.2 - 12.9 fL 8.8 (L)       Immature Granulocytes      0.0 - 0.5 % 0.4       Gran # (ANC)      1.8 - 7.7 K/uL 5.2       Immature Grans (Abs)      0.00 - 0.04 K/uL 0.03       Lymph #      1.0 - 4.8 K/uL 1.5       Mono #      0.3 - 1.0 K/uL 0.7       Eos #      0.0 - 0.5 K/uL 0.1       Baso #      0.00 - 0.20 K/uL 0.05       Differential Method Automated       Sodium      136 - 145 mmol/L  136   134 (L)    Potassium      3.5 - 5.1 mmol/L  3.9   4.6    Chloride      95 - 110 mmol/L  101   100    CO2      23 - 29 mmol/L  29   27    Glucose      70 - 110 mg/dL  111 (H)   88    BUN      8 - 23 mg/dL  5 (L)   7 (L)    Creatinine      0.5 - 1.4 mg/dL  0.8   0.8    Calcium      8.7 - 10.5 mg/dL  9.1   9.1    PROTEIN TOTAL      6.0 - 8.4 g/dL    5.9 (L)    Albumin      3.5 - 5.2 g/dL    3.7    BILIRUBIN TOTAL      0.1 - 1.0 mg/dL    0.3    ALP      55 - 135 U/L    57    AST      10 - 40 U/L    19    ALT      10 - 44 U/L    14    Sed Rate      0 - 36 mm/Hr   <2     CRP      0.0 - 8.2 mg/L   0.2       Component      Latest Ref Rn 2/26/2024   CD3 % Total T Cell      55 - 83 % 75.5    Absolute CD3      700 - 2100 cells/ul 1081    CD8 %  Suppressor T Cell      10 - 39 % 22.2    Absolute CD8      200 - 900 cells/ul 318    CD4 % [place] T Cell      28 - 57 % 57.7 (H)    Absolute CD4      300 - 1400 cells/ul 827    CD4/CD8 Ratio      0.9 - 3.6  2.60    CD56 + CD16 Natural Killers      7 - 31 % 18.4    Absolute CD56 + CD16      90 - 600 cells/ul 264    CD19 B Cells      6 - 19 % 5.8 (L)    Absolute CD19      100 - 500 cells/ul 83 (L)      Component      Latest Ref Rng 2/26/2024 4/3/2024   S.pneumoniae Type 1      >=1.0 mcg/mL 2.1  2.2    Pneumococcal Serotype 2 IgG (PNX)      >=1.0 mcg/mL 0.3  0.3    S.pneumoniae Type 3      >=1.0 mcg/mL <0.1  0.1    Pneumococcal Serotype 4 IgG  (P7,P13,PNX)      >=1.0 mcg/mL 0.1  0.1    S.pneumoniae Type 5      >=1.0 mcg/mL 2.0  2.1    S.pneumoniae Type 8      >=1.0 mcg/mL 0.2  0.2    S.pneumoniae Type 9N      >=1.0 mcg/mL 0.1  0.1    S.pneumoniae Type 12F      >=1.0 mcg/mL <0.1  <0.1    Pneumococcal Serotype 14 IgG (P7,P13,PNX)      >=1.0 mcg/mL 0.2  0.3    Pneumococcal Serotype 17F IgG (PNX)      >=1.0 mcg/mL 0.3  0.3    S.pneumoniae Type 19F      >=1.0 mcg/mL 1.0  1.0    Pneumococcal Serotype 20 IgG (PNX)      >=1.0 mcg/mL 0.9  0.9    Pneumococcal Serotype 22F IgG (PNX)      >=1.0 mcg/mL 0.1  0.1    S.pneumoniae Type 23F      >=1.0 mcg/mL 0.2  0.2    S.pneumoniae Type 6B      >=1.0 mcg/mL 0.2  0.3    Pneumococcal Serotype 10A IgG (PNX)      >=1.0 mcg/mL 5.7  6.0    Pneumococcal Serotype 11A IgG (PNX)      >=1.0 mcg/mL 0.1  0.1    S.pneumoniae Type 7F      >=1.0 mcg/mL 1.6  1.6    Pneumococcal Serotype 15B IgG (PNX)      >=1.0 mcg/mL 0.1  0.2    S.pneumoniae Type 18C      >=1.0 mcg/mL 0.9  1.0    Pneumococcal Serotype 19A IgG (P13,PNX)      >=1.0 mcg/mL 3.5  3.6    S.pneumoniae Type 9V Abs      >=1.0 mcg/mL 0.1  0.1    Pneumococcal Serotype 33 IgG (PNX)      >=1.0 mcg/mL 0.2  0.3    PN23 Interpretation SEE BELOW  SEE BELOW      Component      Latest Ref Rng 2/21/2024 2/26/2024   IgG      650 - 1600 mg/dL 409 (L)  364  (L)    IgA Level      40 - 350 mg/dL 139  128    IgM      50 - 300 mg/dL 21 (L)  20 (L)    Total IgE      0 - 100 IU/mL  <35      Component      Latest Ref [facility] ([place], [place]) 2/21/2024   Protein, Serum      6.0 - 8.4 g/dL 6.0    Albumin grams/dl      3.35 - 5.55 g/dL 3.86    Alpha-1 grams/dl      0.17 - 0.41 g/dL 0.31    Alpha-2      0.43 - 0.99 g/dL 0.79    Beta      0.50 - 1.10 g/dL 0.65    Gamma      0.67 - 1.58 g/dL 0.39 (L)    Pathologist Interpretation SPE Borderline decreased total protein. Normal gamma globulins are decreased. No definitive monoclonal peaks identified.    Pathologist Interpretation SAUMYA No monoclonal peaks identified.       ALLERGEN TESTING     Immunocaps:   Component      Latest Ref [facility] ([place], [place]) 2/26/2024   D. farinae      <0.10 kU/L <0.10    D. farinae Class CLASS 0    D. pteronyssinus Dust Mite IgE      <0.10 kU/L <0.10    D. pteronyssinus Class CLASS 0    Bermuda Grass IgE      <0.10 kU/L <0.10    [place]ermuda Grass Class CLASS 0    [place] Grass IgE      <0.10 kU/L <0.10    [place] Grass Class CLASS 0    Osage IgE      <0.10 kU/L <0.10    Osage Class CLASS 0    English Plantain IgE      <0.10 kU/L <0.10    English Plantain Class CLASS 0    Balm Tree IgE      <0.10 kU/L <0.10    [place], Class CLASS 0    Pecan [place] Tree IgE      <0.10 kU/L <0.10    Pecan, Class CLASS 0    Marshelder IgE      <0.10 kU/L <0.10    Marshelder Class CLASS 0    Ragweed, Western IgE      <0.10 kU/L <0.10    Ragweed, Western, Class CLASS 0    A. alternata IgE      <0.10 kU/L <0.10    Altern. alternata Class CLASS 0    Aspergillus Fumigatus IgE      <0.10 kU/L <0.10    A. fumigatus Class CLASS 0    Cat Dander IgE      <0.10 kU/L <0.10    Cat Epithelium Class CLASS 0    Cockroach, IgE      <0.10 kU/L <0.10    Cockroach, IgE       CLASS 0    Dog Dander IgE      <0.10 kU/L <0.10    Dog Dander Class CLASS 0    Curvularia lunata      <0.10 kU/L <0.10    Curvularia Lunata Class CLASS 0    Penicillium IgE      <0.10 kU/L <0.10    Penicillium  Class CLASS 0    Cladosporium IgE      <0.10 kU/L <0.10    Cladosporium Class CLASS 0       PULMONARY FUNCTION TESTING     Date 5/3/24:  FVC:         96%ref   FEV1:         87%ref  FEV1/FVC: 70%  FEF 25-75: 60%ref  DLCO:        55%ref  Interpretation: Spirometry is normal. Lung volume determination is normal. Airway mechanics are abnormal showing increased airway resistance and decreased conductance. DLCO is moderately decreased.     IMAGING & OTHER DIAGNOSTICS     CT chest low dose 5/14/24:  (See report for full read).  Lungs: There are no abnormal opacities that require further evaluation.  The largest opacity in the right lung appears solid and measures 0.4 cm on series 4, image 97, unchanged.  The largest opacity in the left lung appears calcified and measures 0.2 cm on series 4, image 366. The lungs show no findings consistent with emphysema.  Impression:  Lung-RADS Category:  2 - Benign Appearance or Behavior - continue annual screening with LDCT in 12 months.  Clinically or potentially clinically significant non lung cancer finding:  None.  Prior Lung Cancer Modifier:  No history of prior lung cancer.    CXR 1 view 1/25/24:  FINDINGS:  Heart size and pulmonary vascularity are within normal limits.  Mild tortuosity of the thoracic aorta and brachiocephalic vessels with atherosclerotic calcification in the wall of the aortic arch.  Lungs are satisfactorily expanded.  Mild accentuation of the interstitial pattern bilaterally.  No airspace consolidation or pleural effusion.  No pneumothorax.  Skeletal structures appear intact.  Thoracolumbar scoliosis.  Impression:  No acute cardiopulmonary disease and no significant interval change    CT sinuses 11/3/22:  Impression:  Acute appearing right maxillary sinusitis.  Prior right maxillary antral window.  Small area of sclerosis, and osseous lucency anterior mid maxilla along the midline incisive canal with punctate  dense material area suggestive of possible prior  dental procedure.  There is also a small dense linear area of what appears to be cement, left to the midline within a small incisive canal, medial to the infra orbital canal, likely relate to a prior root canal treatment it is also unchanged from prior study no definite additional osseous erosive changes.    CT chest low dose 8/21/18:  (See report for full read).  Lungs: There are no abnormal opacities that require further evaluation.   The lungs show no findings consistent with emphysema.  IMPRESSION:   Lung-RADS Category:  1 - Negative - Continue annual screening with LDCT in 12 months.  Clinically or potentially clinically significant non lung cancer finding:  None.  Prior Lung Cancer Modifier:  No history of prior lung cancer.      CHART REVIEW     Reviewed pulm note, ENT note, labs, imaging, PFT's.      ASSESSMENT & PLAN     Bhavna Landry is a 68 y.o. female with     # Hypogammaglobulinemia; Recurrent sinusitis; Weak Ab response to pneumococcal vaccine: Low IgG and IgM. She required antibiotics at least 4 times between 2/2023 and 2/2024. She was previously on sulfasalazine for her psoriatic arthritis, which can cause hypogam, but hasn't been on it since 2021 (and it isn't clear how long the hypogam effects would last). She received pneumovax 3 times (last in 10/2020), and prevnar-13 once (in 8/2018). Pneumococcal titers were low, didn't respond to prevnar-20 (given 3/2024). Lymphocyte flow cytometry with low CD19 B cells. Suspect CVID, but will give pneumovax booster before confirming this diagnosis.  -pneumovax given today.  -in 4-6 weeks, will recheck pneumococcal titers and total IgG/A/M.  -discussed that Ig replacement therapy could be a potential option for her. However, will hold off for now (she hasn't required antibiotics since 2/2024).     # Rhinitis: Immunocaps negative for evidence of allergy. She reports increased symptoms over the past week, just restarted her flonase. Previously recommend she try  saline rinses, but she hasn't yet.   -continue flonase 1-2 SEN nightly.     # Wheezing and shortness of breath: Suspected COPD as patient is a long time smoker and previous chest imaging with emphysematous changes; however, she saw pulm recently, spirometry was without obstruction and more recent CT chest without emphysematous changes. Total eos have been normal. She reports she has been using albuterol most nights recently for wheezing. She has symbicort to use prn, but rarely uses it.   -recommend she try using her symbicort 80 mcg prn more as rescue over her albuterol (as this might lead to better control overall).       Follow up: September    I spent a total of 50 minutes on the day of the visit.  This includes face to face time and non-face to face time preparing to see the patient (eg, review of tests), obtaining and/or reviewing separately obtained history, documenting clinical information in the electronic or other health record, independently interpreting results and communicating results to the patient/family/caregiver, or care coordinator.    Jeff Benítez MD  Allergy/Immunology

## 2024-05-27 ENCOUNTER — PATIENT MESSAGE (OUTPATIENT)
Dept: INTERNAL MEDICINE | Facility: CLINIC | Age: 69
End: 2024-05-27
Payer: MEDICARE

## 2024-05-28 ENCOUNTER — TELEPHONE (OUTPATIENT)
Dept: INTERNAL MEDICINE | Facility: CLINIC | Age: 69
End: 2024-05-28
Payer: MEDICARE

## 2024-05-28 NOTE — TELEPHONE ENCOUNTER
----- Message from Julia Irby sent at 5/28/2024  8:38 AM CDT -----  Contact: [phone]  Type: Returning a call    Who left a message?Monica Irby RN    When did the practice call?yesterday 5/27/2024    Comments:

## 2024-05-28 NOTE — TELEPHONE ENCOUNTER
----- Message from Kailyn Dixon MA sent at 5/28/2024  2:41 PM CDT -----  Patient is returning a phone call.    Who left a message for the patient: Staff    Does patient know what this is regarding: Last Message    Would you like a call back, or a response through your [facility] portal?: Jessica at [phone]      Comments:

## 2024-05-29 ENCOUNTER — OFFICE VISIT (OUTPATIENT)
Dept: INTERNAL MEDICINE | Facility: CLINIC | Age: 69
End: 2024-05-29
Payer: MEDICARE

## 2024-05-29 ENCOUNTER — TELEPHONE (OUTPATIENT)
Dept: PAIN MEDICINE | Facility: CLINIC | Age: 69
End: 2024-05-29
Payer: MEDICARE

## 2024-05-29 VITALS
BODY MASS INDEX: 19.56 KG/M2 | SYSTOLIC BLOOD PRESSURE: 128 MMHG | OXYGEN SATURATION: 96 % | DIASTOLIC BLOOD PRESSURE: 50 MMHG | HEIGHT: 60 IN | HEART RATE: 72 BPM | WEIGHT: 99.63 LBS

## 2024-05-29 DIAGNOSIS — M54.16 LUMBAR RADICULOPATHY: ICD-10-CM

## 2024-05-29 DIAGNOSIS — M51.36 DDD (DEGENERATIVE DISC DISEASE), LUMBAR: ICD-10-CM

## 2024-05-29 DIAGNOSIS — F41.1 GAD (GENERALIZED ANXIETY DISORDER): ICD-10-CM

## 2024-05-29 DIAGNOSIS — M53.3 SACROILIAC JOINT DYSFUNCTION: ICD-10-CM

## 2024-05-29 DIAGNOSIS — M54.50 CHRONIC LOW BACK PAIN WITHOUT SCIATICA, UNSPECIFIED BACK PAIN LATERALITY: Primary | ICD-10-CM

## 2024-05-29 DIAGNOSIS — F20.0 PARANOID SCHIZOPHRENIA: ICD-10-CM

## 2024-05-29 DIAGNOSIS — G89.29 CHRONIC LOW BACK PAIN WITHOUT SCIATICA, UNSPECIFIED BACK PAIN LATERALITY: Primary | ICD-10-CM

## 2024-05-29 DIAGNOSIS — F33.42 RECURRENT MAJOR DEPRESSIVE DISORDER, IN FULL REMISSION: ICD-10-CM

## 2024-05-29 DIAGNOSIS — Z17.0 CARCINOMA OF UPPER-OUTER QUADRANT OF RIGHT BREAST IN FEMALE, ESTROGEN RECEPTOR POSITIVE: ICD-10-CM

## 2024-05-29 DIAGNOSIS — M25.473 ANKLE SWELLING, UNSPECIFIED LATERALITY: ICD-10-CM

## 2024-05-29 DIAGNOSIS — E78.5 HYPERLIPIDEMIA, UNSPECIFIED HYPERLIPIDEMIA TYPE: ICD-10-CM

## 2024-05-29 DIAGNOSIS — C50.411 CARCINOMA OF UPPER-OUTER QUADRANT OF RIGHT BREAST IN FEMALE, ESTROGEN RECEPTOR POSITIVE: ICD-10-CM

## 2024-05-29 DIAGNOSIS — G89.4 CHRONIC PAIN SYNDROME: ICD-10-CM

## 2024-05-29 DIAGNOSIS — M46.1 BILATERAL SACROILIITIS: ICD-10-CM

## 2024-05-29 DIAGNOSIS — I10 HYPERTENSION, ESSENTIAL: Primary | ICD-10-CM

## 2024-05-29 PROCEDURE — 1101F PT FALLS ASSESS-DOCD LE1/YR: CPT | Mod: CPTII,S$GLB,, | Performed by: PHYSICIAN ASSISTANT

## 2024-05-29 PROCEDURE — 3288F FALL RISK ASSESSMENT DOCD: CPT | Mod: CPTII,S$GLB,, | Performed by: PHYSICIAN ASSISTANT

## 2024-05-29 PROCEDURE — 1160F RVW MEDS BY RX/DR IN RCRD: CPT | Mod: CPTII,S$GLB,, | Performed by: PHYSICIAN ASSISTANT

## 2024-05-29 PROCEDURE — 1159F MED LIST DOCD IN RCRD: CPT | Mod: CPTII,S$GLB,, | Performed by: PHYSICIAN ASSISTANT

## 2024-05-29 PROCEDURE — 1125F AMNT PAIN NOTED PAIN PRSNT: CPT | Mod: CPTII,S$GLB,, | Performed by: PHYSICIAN ASSISTANT

## 2024-05-29 PROCEDURE — 3074F SYST BP LT 130 MM HG: CPT | Mod: CPTII,S$GLB,, | Performed by: PHYSICIAN ASSISTANT

## 2024-05-29 PROCEDURE — 99999 PR PBB SHADOW E&M-EST. PATIENT-LVL V: CPT | Mod: PBBFAC,,, | Performed by: PHYSICIAN ASSISTANT

## 2024-05-29 PROCEDURE — 3078F DIAST BP <80 MM HG: CPT | Mod: CPTII,S$GLB,, | Performed by: PHYSICIAN ASSISTANT

## 2024-05-29 PROCEDURE — 99214 OFFICE O/P EST MOD 30 MIN: CPT | Mod: S$GLB,,, | Performed by: PHYSICIAN ASSISTANT

## 2024-05-29 PROCEDURE — 3008F BODY MASS INDEX DOCD: CPT | Mod: CPTII,S$GLB,, | Performed by: PHYSICIAN ASSISTANT

## 2024-05-29 PROCEDURE — 4010F ACE/ARB THERAPY RXD/TAKEN: CPT | Mod: CPTII,S$GLB,, | Performed by: PHYSICIAN ASSISTANT

## 2024-05-29 RX ORDER — HYDROCODONE BITARTRATE AND ACETAMINOPHEN 5; 325 MG/1; MG/1
1 TABLET ORAL EVERY 12 HOURS PRN
Qty: 60 TABLET | Refills: 0 | Status: SHIPPED | OUTPATIENT
Start: 2024-05-29

## 2024-05-29 NOTE — PROGRESS NOTES
Subjective:       Patient ID: Bhavna Landry is a 68 y.o. female.        Chief Complaint: Foot Swelling    Bhavna Landry is an established patient of Sadaf Vargas MD here today for urgent care visit.    Bilateral ankle and foot swelling since 3/2024  She started taking amlodipine 5 mg AM and 10 mg PM on her own  Swelling better in morning, comes on over course of day  No chest pain or shortness of breath    Chronic back pain -   S/p two procedures by pain management recently 4/24 and 5/22  Pain remains  Hydrocodone effective in treatment, taking BID-TID prn pain, requesting refill  Last filled 5/9 but only for 30 tablets    Lipids -  Lipitor 80 mg    HTN -   Amlodipine 5 mg BID (listed dosing but patient taking differently)  Losartan 25 mg   Clonidine 0.1 mg prn    Schizophrenia, RASHIDA, depression, MCI -   Followed by Dr. Poe    H/o hyponatremia           Review of Systems   Constitutional:  Negative for chills, diaphoresis, fatigue and fever.   HENT:  Negative for congestion and sore throat.    Eyes:  Negative for visual disturbance.   Respiratory:  Negative for cough, chest tightness and shortness of breath.    Cardiovascular:  Negative for chest pain, palpitations and leg swelling.   Gastrointestinal:  Negative for abdominal pain, blood in stool, constipation, diarrhea, nausea and vomiting.   Genitourinary:  Negative for dysuria, frequency, hematuria and urgency.   Musculoskeletal:  Positive for joint swelling (ankles). Negative for arthralgias and back pain.   Skin:  Negative for rash.   Neurological:  Negative for dizziness, syncope, weakness and headaches.   Psychiatric/Behavioral:  Negative for dysphoric mood and sleep disturbance. The patient is not nervous/anxious.        Objective:      Physical Exam  Vitals and nursing note reviewed.   Constitutional:       Appearance: Normal appearance. She is well-developed.   HENT:      Head: Normocephalic.      Right Ear: Tympanic membrane and external ear  "normal.      Left Ear: Tympanic membrane and external ear normal.      Nose: No mucosal edema or rhinorrhea.      Mouth/Throat:      Pharynx: Oropharynx is clear.   Eyes:      Pupils: Pupils are equal, round, and reactive to light.   Cardiovascular:      Rate and Rhythm: Normal rate and regular rhythm.      Heart sounds: Normal heart sounds. No murmur heard.     No friction rub. No gallop.   Pulmonary:      Effort: Pulmonary effort is normal. No respiratory distress.      Breath sounds: Normal breath sounds.   Abdominal:      Palpations: Abdomen is soft.      Tenderness: There is no abdominal tenderness.   Musculoskeletal:      Comments: Mild edema confined to bilateral ankles   Skin:     General: Skin is warm and dry.   Neurological:      Mental Status: She is alert.         Assessment:       1. Hypertension, essential    2. Ankle swelling, unspecified laterality    3. Chronic pain syndrome    4. Paranoid schizophrenia    5. RASHIDA (generalized anxiety disorder)    6. Recurrent major depressive disorder, in full remission    7. Hyperlipidemia, unspecified hyperlipidemia type    8. 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, estrogen receptor positive    9. Sacroiliac joint dysfunction    10. Bilateral sacroiliitis    11. Lumbar radiculopathy    12. DDD (degenerative disc disease), lumbar        Plan:       Bhavna Lorenzo" was seen today for foot swelling.    Diagnoses and all orders for this visit:    Hypertension, essential - stable and controlled, continue current regimen, REDUCE amlodipine back to dosage as listed - 5 mg BID, discussed she cannot take more than this, she understands, likely contributing to ankle swelling    Ankle swelling, unspecified laterality - monitor on lower dose amlodipine, rec compression stockings but she's unsure she can get then on/off, if ongoing will evaluate further with echo, labs, and consider further reductions of amlodipine dosage    Chronic pain syndrome - " "discussed with Dr. Vargas who will refill hydrocodone for #60 tablets to take BID prn    Paranoid schizophrenia  RASHIDA (generalized anxiety disorder)  Recurrent major depressive disorder, in full remission       -     Followed by Dr. Poe    Hyperlipidemia, unspecified hyperlipidemia type - stable and controlled, continue current regimen  Lab Results   Component Value Date    CHOL 141 05/24/2023    TRIG 45 05/24/2023    HDL 78 (H) 05/24/2023    LDLCALC 54.0 (L) 05/24/2023     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, estrogen receptor positive    Sacroiliac joint dysfunction  Bilateral sacroiliitis  Lumbar radiculopathy  DDD (degenerative disc disease), lumbar       -     See above - chronic pain, s/p multiple procedures but pain ongoing, followed by pain clinic    Pt has been given instructions populated from patient instructions database and has verbalized understanding of the after visit summary and information contained wherein.    Follow up with a primary care provider. May go to ER for acute shortness of breath, lightheadedness, fever, or any other emergent complaints or changes in condition.    "This note will be shared with the patient"    Future Appointments   Date Time Provider Department Center   6/3/2024  2:40 PM APPOINTMENT LAB, [facility]UAN [facility]   6/3/2024  3:20 PM [facility] MAMMO1 [facility] MAMMO [place] [facility]   6/7/2024  3:30 PM Walt Zhang DPM [facility] PODIA [place]   6/20/2024  2:40 PM Daniel Brar FNP [facility] PAINMGT [place] [facility]   6/21/2024  3:00 PM LAB, JUAN C [facility] LAB [place]   8/20/2024  2:20 PM Philipp Goel MD [facility] CARDIO [facility]   9/27/2024  9:40 AM Jeff Benítez MD [facility] ALLERG [place]   11/6/2024 12:30 PM LAB, HEMONC CANCER BLDG [facility] LAB [facility]   11/6/2024  1:30 PM Cheyanne Smith, NP [facility] HEMONC3 [facility]   11/6/2024  2:00 PM CHEMO 1 [facility] CHEMO [facility]                 "

## 2024-05-29 NOTE — PATIENT INSTRUCTIONS
REDUCE amlodipine to 5 mg twice daily  If swelling does not resolve, will reduce amlodipine dosage further - please message to let me know

## 2024-05-30 ENCOUNTER — TELEPHONE (OUTPATIENT)
Dept: INTERNAL MEDICINE | Facility: CLINIC | Age: 69
End: 2024-05-30
Payer: MEDICARE

## 2024-05-30 ENCOUNTER — TELEPHONE (OUTPATIENT)
Dept: PAIN MEDICINE | Facility: CLINIC | Age: 69
End: 2024-05-30
Payer: MEDICARE

## 2024-05-30 ENCOUNTER — PATIENT MESSAGE (OUTPATIENT)
Dept: PAIN MEDICINE | Facility: CLINIC | Age: 69
End: 2024-05-30
Payer: MEDICARE

## 2024-05-30 ENCOUNTER — PATIENT MESSAGE (OUTPATIENT)
Dept: INTERNAL MEDICINE | Facility: CLINIC | Age: 69
End: 2024-05-30
Payer: MEDICARE

## 2024-05-30 NOTE — TELEPHONE ENCOUNTER
----- Message from Carol Ann Brown sent at 5/29/2024  4:40 PM CDT -----  Type:  Needs Medical Advice    Who Called: pt  Symptoms (please be specific): Pt went to her primary Dr and the Dr said that her  swollen ankles are due to the  medication she is taking and the  pain in right back cheek  is caused by her back, please oscar pt right back bp wants to discuss   Would the patient rather a call back or a response via [facility]? call  Best Call Back Number: [phone]  Additional Information:

## 2024-05-30 NOTE — TELEPHONE ENCOUNTER
----- Message from Julia Iselakosta Irby sent at 5/30/2024  1:39 PM CDT -----  Contact: [phone]  1MEDICALADVICE     Patient is calling for Medical Advice regarding: pt is calling to let you know that Dr. Storm called pt and said she needs to go to er because it can be an localize infection that is where the pain might be coming from. Is there a way you can schedule a MRI from the waist down and will that show if there is a infection or something else? Please call pt back today and advise.    How long has patient had these symptoms:    Pharmacy name and phone#:  [id number] - [place] - [address] AT [facility] [address]  [address] [address]  Phone: [phone] Fax: [phone]    Would like response via [facility]:  callback    Comments:

## 2024-05-30 NOTE — TELEPHONE ENCOUNTER
I attempted to reach the patient by phone ([phone]) regarding reports that patient has a painful lump on her low back. I was unable to reach the patient and left a voicemail stating that I am recommending that the patient go to the ED or urgent care to have this assessed and rule out infection, abscess, or other causes for this painful swelling.    Maile Storm, DO   Interventional Pain Management

## 2024-05-30 NOTE — TELEPHONE ENCOUNTER
Called patient advised she needs to follow Dr. Storm advice and go ED for possible infection. Dr. Vargas is out of office until 6-10-24

## 2024-05-30 NOTE — TELEPHONE ENCOUNTER
I  called the Pt but no answer left a message so we can discuss what is going on after seeing her PCP yesterday so I can let Dr. Storm know. Pt. Said that the Dr said that her swollen ankles are due to the medication she is taking and the pain in her right back cheek is caused by her back.

## 2024-05-31 ENCOUNTER — HOSPITAL ENCOUNTER (EMERGENCY)
Facility: HOSPITAL | Age: 69
Discharge: HOME | End: 2024-05-31
Attending: STUDENT IN AN ORGANIZED HEALTH CARE EDUCATION/TRAINING PROGRAM
Payer: MEDICARE

## 2024-05-31 ENCOUNTER — OFFICE VISIT (OUTPATIENT)
Dept: INTERNAL MEDICINE | Facility: CLINIC | Age: 69
End: 2024-05-31
Payer: MEDICARE

## 2024-05-31 VITALS
HEIGHT: 60 IN | WEIGHT: 100 LBS | RESPIRATION RATE: 18 BRPM | BODY MASS INDEX: 19.63 KG/M2 | DIASTOLIC BLOOD PRESSURE: 66 MMHG | HEART RATE: 71 BPM | SYSTOLIC BLOOD PRESSURE: 161 MMHG | TEMPERATURE: 98 F | OXYGEN SATURATION: 97 %

## 2024-05-31 VITALS
OXYGEN SATURATION: 98 % | DIASTOLIC BLOOD PRESSURE: 84 MMHG | HEIGHT: 60 IN | BODY MASS INDEX: 19.65 KG/M2 | WEIGHT: 100.06 LBS | SYSTOLIC BLOOD PRESSURE: 156 MMHG | HEART RATE: 73 BPM

## 2024-05-31 DIAGNOSIS — M54.40 CHRONIC MIDLINE LOW BACK PAIN WITH SCIATICA, SCIATICA LATERALITY UNSPECIFIED: Primary | ICD-10-CM

## 2024-05-31 DIAGNOSIS — G89.29 CHRONIC MIDLINE LOW BACK PAIN WITH SCIATICA, SCIATICA LATERALITY UNSPECIFIED: Primary | ICD-10-CM

## 2024-05-31 DIAGNOSIS — M46.1 BILATERAL SACROILIITIS: ICD-10-CM

## 2024-05-31 DIAGNOSIS — M54.50 CHRONIC LOW BACK PAIN WITHOUT SCIATICA, UNSPECIFIED BACK PAIN LATERALITY: Primary | ICD-10-CM

## 2024-05-31 DIAGNOSIS — G89.29 CHRONIC LOW BACK PAIN WITHOUT SCIATICA, UNSPECIFIED BACK PAIN LATERALITY: Primary | ICD-10-CM

## 2024-05-31 LAB
ALBUMIN SERPL BCP-MCNC: 4.2 G/DL (ref 3.5–5.2)
ALP SERPL-CCNC: 69 U/L (ref 55–135)
ALT SERPL W/O P-5'-P-CCNC: 13 U/L (ref 10–44)
ANION GAP SERPL CALC-SCNC: 14 MMOL/L (ref 8–16)
AST SERPL-CCNC: 19 U/L (ref 10–40)
BASOPHILS # BLD AUTO: 0.06 K/UL (ref 0–0.2)
BASOPHILS NFR BLD: 0.8 % (ref 0–1.9)
BILIRUB SERPL-MCNC: 0.3 MG/DL (ref 0.1–1)
BILIRUB UR QL STRIP: NEGATIVE
BUN SERPL-MCNC: 10 MG/DL (ref 8–23)
CALCIUM SERPL-MCNC: 10.1 MG/DL (ref 8.7–10.5)
CHLORIDE SERPL-SCNC: 101 MMOL/L (ref 95–110)
CLARITY UR: CLEAR
CO2 SERPL-SCNC: 25 MMOL/L (ref 23–29)
COLOR UR: YELLOW
CREAT SERPL-MCNC: 0.8 MG/DL (ref 0.5–1.4)
DIFFERENTIAL METHOD BLD: ABNORMAL
EOSINOPHIL # BLD AUTO: 0.3 K/UL (ref 0–0.5)
EOSINOPHIL NFR BLD: 3.3 % (ref 0–8)
ERYTHROCYTE [DISTWIDTH] IN BLOOD BY AUTOMATED COUNT: 15.4 % (ref 11.5–14.5)
EST. GFR  (NO RACE VARIABLE): >60 ML/MIN/1.73 M^2
GLUCOSE SERPL-MCNC: 84 MG/DL (ref 70–110)
GLUCOSE UR QL STRIP: NEGATIVE
HCT VFR BLD AUTO: 40.3 % (ref 37–48.5)
HGB BLD-MCNC: 13.6 G/DL (ref 12–16)
HGB UR QL STRIP: NEGATIVE
IMM GRANULOCYTES # BLD AUTO: 0.02 K/UL (ref 0–0.04)
IMM GRANULOCYTES NFR BLD AUTO: 0.3 % (ref 0–0.5)
KETONES UR QL STRIP: NEGATIVE
LEUKOCYTE ESTERASE UR QL STRIP: NEGATIVE
LYMPHOCYTES # BLD AUTO: 2.6 K/UL (ref 1–4.8)
LYMPHOCYTES NFR BLD: 32.8 % (ref 18–48)
MCH RBC QN AUTO: 32.4 PG (ref 27–31)
MCHC RBC AUTO-ENTMCNC: 33.7 G/DL (ref 32–36)
MCV RBC AUTO: 96 FL (ref 82–98)
MONOCYTES # BLD AUTO: 0.9 K/UL (ref 0.3–1)
MONOCYTES NFR BLD: 10.7 % (ref 4–15)
NEUTROPHILS # BLD AUTO: 4.1 K/UL (ref 1.8–7.7)
NEUTROPHILS NFR BLD: 52.1 % (ref 38–73)
NITRITE UR QL STRIP: NEGATIVE
NRBC BLD-RTO: 0 /100 WBC
PH UR STRIP: 7 [PH] (ref 5–8)
PLATELET # BLD AUTO: 345 K/UL (ref 150–450)
PMV BLD AUTO: 8.1 FL (ref 9.2–12.9)
POTASSIUM SERPL-SCNC: 4.4 MMOL/L (ref 3.5–5.1)
PROT SERPL-MCNC: 6.8 G/DL (ref 6–8.4)
PROT UR QL STRIP: NEGATIVE
RBC # BLD AUTO: 4.2 M/UL (ref 4–5.4)
SODIUM SERPL-SCNC: 140 MMOL/L (ref 136–145)
SP GR UR STRIP: 1 (ref 1–1.03)
URN SPEC COLLECT METH UR: NORMAL
UROBILINOGEN UR STRIP-ACNC: NEGATIVE EU/DL
WBC # BLD AUTO: 7.93 K/UL (ref 3.9–12.7)

## 2024-05-31 PROCEDURE — 3079F DIAST BP 80-89 MM HG: CPT | Mod: CPTII,S$GLB,, | Performed by: NURSE PRACTITIONER

## 2024-05-31 PROCEDURE — 3008F BODY MASS INDEX DOCD: CPT | Mod: CPTII,S$GLB,, | Performed by: NURSE PRACTITIONER

## 2024-05-31 PROCEDURE — 1160F RVW MEDS BY RX/DR IN RCRD: CPT | Mod: CPTII,S$GLB,, | Performed by: NURSE PRACTITIONER

## 2024-05-31 PROCEDURE — 3077F SYST BP >= 140 MM HG: CPT | Mod: CPTII,S$GLB,, | Performed by: NURSE PRACTITIONER

## 2024-05-31 PROCEDURE — 81003 URINALYSIS AUTO W/O SCOPE: CPT | Performed by: STUDENT IN AN ORGANIZED HEALTH CARE EDUCATION/TRAINING PROGRAM

## 2024-05-31 PROCEDURE — 80053 COMPREHEN METABOLIC PANEL: CPT | Performed by: STUDENT IN AN ORGANIZED HEALTH CARE EDUCATION/TRAINING PROGRAM

## 2024-05-31 PROCEDURE — 85025 COMPLETE CBC W/AUTO DIFF WBC: CPT | Performed by: STUDENT IN AN ORGANIZED HEALTH CARE EDUCATION/TRAINING PROGRAM

## 2024-05-31 PROCEDURE — 1125F AMNT PAIN NOTED PAIN PRSNT: CPT | Mod: CPTII,S$GLB,, | Performed by: NURSE PRACTITIONER

## 2024-05-31 PROCEDURE — 25000003 PHARM REV CODE 250: Performed by: STUDENT IN AN ORGANIZED HEALTH CARE EDUCATION/TRAINING PROGRAM

## 2024-05-31 PROCEDURE — 3288F FALL RISK ASSESSMENT DOCD: CPT | Mod: CPTII,S$GLB,, | Performed by: NURSE PRACTITIONER

## 2024-05-31 PROCEDURE — 99999 PR PBB SHADOW E&M-EST. PATIENT-LVL V: CPT | Mod: PBBFAC,,, | Performed by: NURSE PRACTITIONER

## 2024-05-31 PROCEDURE — 99285 EMERGENCY DEPT VISIT HI MDM: CPT | Mod: 25

## 2024-05-31 PROCEDURE — 96372 THER/PROPH/DIAG INJ SC/IM: CPT | Performed by: STUDENT IN AN ORGANIZED HEALTH CARE EDUCATION/TRAINING PROGRAM

## 2024-05-31 PROCEDURE — 99215 OFFICE O/P EST HI 40 MIN: CPT | Mod: S$GLB,,, | Performed by: NURSE PRACTITIONER

## 2024-05-31 PROCEDURE — 1159F MED LIST DOCD IN RCRD: CPT | Mod: CPTII,S$GLB,, | Performed by: NURSE PRACTITIONER

## 2024-05-31 PROCEDURE — 1101F PT FALLS ASSESS-DOCD LE1/YR: CPT | Mod: CPTII,S$GLB,, | Performed by: NURSE PRACTITIONER

## 2024-05-31 PROCEDURE — 63600175 PHARM REV CODE 636 W HCPCS: Performed by: STUDENT IN AN ORGANIZED HEALTH CARE EDUCATION/TRAINING PROGRAM

## 2024-05-31 PROCEDURE — 4010F ACE/ARB THERAPY RXD/TAKEN: CPT | Mod: CPTII,S$GLB,, | Performed by: NURSE PRACTITIONER

## 2024-05-31 RX ORDER — KETOROLAC TROMETHAMINE 30 MG/ML
15 INJECTION, SOLUTION INTRAMUSCULAR; INTRAVENOUS
Status: DISCONTINUED | OUTPATIENT
Start: 2024-05-31 | End: 2024-05-31

## 2024-05-31 RX ORDER — ACETAMINOPHEN 500 MG
500 TABLET ORAL
Status: COMPLETED | OUTPATIENT
Start: 2024-05-31 | End: 2024-05-31

## 2024-05-31 RX ORDER — HYDROCODONE BITARTRATE AND ACETAMINOPHEN 5; 325 MG/1; MG/1
1 TABLET ORAL
Status: COMPLETED | OUTPATIENT
Start: 2024-05-31 | End: 2024-05-31

## 2024-05-31 RX ORDER — KETOROLAC TROMETHAMINE 30 MG/ML
15 INJECTION, SOLUTION INTRAMUSCULAR; INTRAVENOUS
Status: COMPLETED | OUTPATIENT
Start: 2024-05-31 | End: 2024-05-31

## 2024-05-31 RX ORDER — LIDOCAINE 50 MG/G
1 PATCH TOPICAL DAILY
Qty: 14 PATCH | Refills: 0 | Status: SHIPPED | OUTPATIENT
Start: 2024-05-31

## 2024-05-31 RX ORDER — LIDOCAINE 50 MG/G
1 PATCH TOPICAL
Status: DISCONTINUED | OUTPATIENT
Start: 2024-05-31 | End: 2024-05-31 | Stop reason: HOSPADM

## 2024-05-31 RX ADMIN — HYDROCODONE BITARTRATE AND ACETAMINOPHEN 1 TABLET: 5; 325 TABLET ORAL at 08:05

## 2024-05-31 RX ADMIN — LIDOCAINE 1 PATCH: 50 PATCH CUTANEOUS at 08:05

## 2024-05-31 RX ADMIN — ACETAMINOPHEN 500 MG: 500 TABLET ORAL at 08:05

## 2024-05-31 RX ADMIN — KETOROLAC TROMETHAMINE 15 MG: 30 INJECTION, SOLUTION INTRAMUSCULAR; INTRAVENOUS at 08:05

## 2024-05-31 NOTE — ED NOTES
Present to ED with complaint of back pain that radiate down right leg since March of unknown origin. Denies fall/trauma/injury. Report no relief from treatment with from Pain management.

## 2024-05-31 NOTE — PROGRESS NOTES
"Subjective     Patient ID: Bhavna Landry is a 68 y.o. female.  BP (!) 156/84 (BP Location: Right arm, Patient Position: Sitting, BP Method: Medium (Manual))   Pulse 73   Ht 5' (1.524 m)   Wt 45.4 kg (100 lb 1.4 oz)   SpO2 98%   BMI 19.55 kg/m²      Chief Complaint: Back Pain    Patient with chronic low back pain and bilateral sacroiliitis. Followed by pain management; s/p  right sided sacral lateral branch RFA and subsequent steroid injection over the past couple of months. Prior interventions have not provided relief. Patient has been in contact with her pain management physician who recommended she present to urgent care or ED for evaluation to rule out infectious cause for pain. Patient has been avoiding going to the ED due to fear of associated costs. Pain is throbbing and "electric," radiating into her R buttock. She is unable to find a comfortable position and cannot sit or stand for more than a few minutes. No loss of bowel or bladder control, and no saddle paresthesias. Patient was brought here by her granddaughter today.       Patient Active Problem List   Diagnosis    PSA (psoriatic arthritis)    Low back pain    Essential hypertension    Hyperlipidemia    Paranoid schizophrenia    Insomnia    Extrapyramidal syndrome    Migraine without aura and without status migrainosus, not intractable    Duane's syndrome of right eye    Posterior vitreous detachment, both eyes    Retinal hemorrhage, left    Retinal tear    Fibromyalgia    History of nickel allergy    Internal derangement of right knee    S/P R knee surgery, TKA revision    Pain of right tibia    Brow ptosis    Sacroiliac joint dysfunction    Chronic pain    Myofascial pain syndrome    Restless legs    Osteoporosis, post-menopausal    Senile nuclear sclerosis    Venous incompetence    Bilateral sacroiliitis    PAF (paroxysmal atrial fibrillation)    Bilateral carotid artery disease    Chronic anticoagulation    Left groin pain    Left hip pain    " Mucopurulent chronic bronchitis    Vestibular disequilibrium, left    Decreased ROM of neck    Greater trochanteric bursitis of left hip    Depression    Rheumatoid arthritis    Mild cognitive impairment with memory loss    Atherosclerosis of aorta    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, estrogen receptor positive    Osteopenia of multiple sites    Use of anastrozole    Early dry stage nonexudative age-related macular degeneration of both eyes    Aromatase inhibitor-associated arthralgia    Drug-induced immunodeficiency    Iron deficiency anemia due to chronic blood loss    Decreased functional mobility    Decreased range of motion of lumbar spine    Secondary and unspecified malignant neoplasm of axilla and upper limb lymph nodes    Leg edema, left    Gastroesophageal reflux disease    Injury of left foot    RASHIDA (generalized anxiety disorder)    Cervical spondylosis    Lumbar spondylosis    Decreased strength    Cough        Current Outpatient Medications   Medication Sig Dispense Refill    albuterol (PROVENTIL) 2.5 mg /3 mL (0.083 %) nebulizer solution Take 3 mLs (2.5 mg total) by nebulization every 6 (six) hours as needed for Wheezing. Rescue 90 each 2    albuterol (PROVENTIL/VENTOLIN HFA) 90 mcg/actuation inhaler USE 2 INHALATIONS BY MOUTH 4  TIMES DAILY AS NEEDED 34 g 3    amLODIPine (NORVASC) 5 MG tablet Take 1 tablet (5 mg total) by mouth 2 (two) times a day. 180 tablet 1    ascorbic acid, vitamin C, (VITAMIN C) 500 MG tablet Take 500 mg by mouth once daily.      aspirin (ECOTRIN) 81 MG EC tablet Take 81 mg by mouth once daily.      atorvastatin (LIPITOR) 80 MG tablet TAKE 1 TABLET(80 MG) BY MOUTH EVERY DAY 90 tablet 0    baclofen (LIORESAL) 10 MG tablet 1 tab po tid for muscle cramps. 60 tablet 1    benztropine (COGENTIN) 0.5 MG tablet TAKE 1 TABLET(0.5 MG) BY MOUTH TWICE DAILY 180 tablet 3    budesonide-formoterol 80-4.5 mcg (SYMBICORT) 80-4.5 mcg/actuation HFAA Inhale 2 puffs into  the lungs 2 (two) times a day. 6.9 g 11    ciclopirox (PENLAC) 8 % Soln Apply topically nightly. Paint on affected nail(s) once per night, remove residue once per week with alcohol 6.6 mL 3    cloNIDine (CATAPRES) 0.1 MG tablet 1 tab po q day for systolic BP > 160 or DBP >100. 30 tablet 1    exemestane (AROMASIN) 25 mg tablet Take 1 tablet (25 mg total) by mouth once daily. 30 tablet 11    fluticasone (VERAMYST) 27.5 mcg/actuation nasal spray 2 sprays by Nasal route as needed for Rhinitis.      fluticasone propionate (FLONASE) 50 mcg/actuation nasal spray 1 spray by Each Nostril route once daily.      folic acid (FOLVITE) 1 MG tablet Take 1 tablet (1,000 mcg total) by mouth once daily. 90 tablet 3    gabapentin (NEURONTIN) 100 MG capsule Take 1 capsule (100 mg total) by mouth 3 (three) times daily. 270 capsule 3    HYDROcodone-acetaminophen (NORCO) 5-325 mg per tablet Take 1 tablet by mouth every 12 (twelve) hours as needed for Pain. 60 tablet 0    isosorbide mononitrate (IMDUR) 30 MG 24 hr tablet Take 1 tablet (30 mg total) by mouth once daily. 30 tablet 3    losartan (COZAAR) 25 MG tablet TAKE 1 TABLET EVERY DAY AS NEEDED FOR.WHEN SYSTOLIC BLOOD PRESSURE ABOVE 150 90 tablet 1    methotrexate 2.5 MG Tab TAKE 8 TABLETS BY MOUTH EVERY 7 DAYS. TAKE 4 TABLETS MONDAY MORNING AND TAKE 4 TABLETS MOND NIGHT ONLY 144 tablet 0    montelukast (SINGULAIR) 10 mg tablet Take 1 tablet (10 mg total) by mouth every evening. 30 tablet 0    omega-3 fatty acids/fish oil (FISH OIL-OMEGA-3 FATTY ACIDS) 300-1,000 mg capsule Take by mouth once daily.      omeprazole (PRILOSEC) 20 MG capsule TAKE 1 CAPSULE(20 MG) BY MOUTH EVERY DAY 30 capsule 11    omeprazole (PRILOSEC) 40 MG capsule Take 1 capsule (40 mg total) by mouth every morning. 90 capsule 3    risperiDONE (RISPERDAL) 2 MG tablet TAKE 1 TABLET(2 MG) BY MOUTH EVERY MORNING 90 tablet 3    risperiDONE (RISPERDAL) 4 MG tablet TAKE 1 TABLET(4 MG) BY MOUTH EVERY EVENING 90 tablet 3     venlafaxine (EFFEXOR-XR) 75 MG 24 hr capsule Take 1 capsule (75 mg total) by mouth once daily. 90 capsule 3    vitamin D (VITAMIN D3) 1000 units Tab Take 1,000 Units by mouth once daily.      vitamin E 200 UNIT capsule Take 200 Units by mouth once daily.      zinc gluconate 50 mg tablet Take 50 mg by mouth once daily.       No current facility-administered medications for this visit.     Facility-Administered Medications Ordered in Other Visits   Medication Dose Route Frequency Provider Last Rate Last Admin    tropicamide 1% ophthalmic solution 1 drop  1 drop Right Eye On Call Procedure Karen Song MD   1 drop at 08/01/19 0648        Review of Systems   Constitutional:  Negative for fever.   Musculoskeletal:  Positive for back pain.   Integumentary:  Negative for rash and wound.   All other systems reviewed and are negative.         Objective     Physical Exam  Constitutional:       General: She is not in acute distress.     Appearance: Normal appearance. She is not ill-appearing, toxic-appearing or diaphoretic.   HENT:      Head: Normocephalic and atraumatic.      Nose: Nose normal.      Mouth/Throat:      Mouth: Mucous membranes are moist.      Pharynx: Oropharynx is clear.   Eyes:      Conjunctiva/sclera: Conjunctivae normal.   Cardiovascular:      Rate and Rhythm: Normal rate and regular rhythm.      Heart sounds: Normal heart sounds.   Pulmonary:      Effort: Pulmonary effort is normal.      Breath sounds: Normal breath sounds.   Abdominal:      General: Abdomen is flat.      Palpations: Abdomen is soft.   Musculoskeletal:         General: Tenderness (over R SI joint) present. No swelling, deformity or signs of injury. Normal range of motion.      Cervical back: Normal range of motion and neck supple.      Right lower leg: No edema.      Left lower leg: No edema.   Skin:     General: Skin is warm and dry.   Neurological:      General: No focal deficit present.      Mental Status: She is alert and  oriented to person, place, and time.   Psychiatric:         Mood and Affect: Mood normal.         Behavior: Behavior normal.          Assessment and Plan     1. Chronic low back pain without sciatica, unspecified back pain laterality; Chronic problem with acute exacerbation. Cannot offer adequate pain control in clinic. Patient has been in communication with her pain management physician who recommended she go to the ED. Exam not concerning for infection. Believe this pain is mechanical in nature. Instructed patient to go to the ED for further evaluation and pain control. Discussed with patient's granddaughter who agrees to take her to the [facility] ED. She is stable for transport in a personal vehicle.     2. Bilateral sacroiliitis; Refer to ED       *report called to ED nurse at [facility] at 1642 on 5/31/24    Dougie Conde NP   Internal Medicine           Patient to go to ED and follow up with PCP/pain management.

## 2024-06-01 NOTE — DISCHARGE INSTRUCTIONS
For pain take:  Ibuprofen every 6hrs as needed  Tylenol = Acetaminophen every 6hrs as needed  You can alternate the two medications every 3 hours    You can also apply over-the-counter Voltaren gel or lidocaine patches to the painful areas as needed    You can apply hot or cold packs as needed to the painful area, use them for 15 minutes at a time with breaks in between    Do not take ibuprofen, naproxen, advil, or aleve every day for more than 2-3 weeks without following up with your primary care provider, as they can cause stomach pain and other complications if used every day over long periods of time  Return to the emergency department for any leg weakness, groin numbness, fever/chills for any other concerning symptoms

## 2024-06-01 NOTE — ED PROVIDER NOTES
Encounter Date: 5/31/2024       History     Chief Complaint   Patient presents with    Back Pain     Pt reports right lower back pain radiating down right leg since March. States she has seen pain management but no relief from injection she received, or current meds she is taking.     68 y.o. female with arthritis, history of breast cancer, fibromyalgia, HLD, HTN, schizophrenia, history of migraines, chronic back pain, GERD presents for low back.  Patient reports chronic low back pain for which she follows with pain management.  She had L5/S1 steroid injection approximately 3 weeks ago and reports pain at the site of the injection as well as a focal area of swelling.  She was seen at primary care and referred to the ED for further management.  She denies any fever/chills, lower extremity weakness, lower extremity numbness, groin numbness, dysuria.  She does report some slightly increased urinary frequency    The history is provided by the patient and medical records.     Review of patient's allergies indicates:   Allergen Reactions    Etanercept Other (See Comments)     Other reaction(s): recurrent infections    Chloramphenicol sod succinate Hives    Codeine Other (See Comments)     Other reaction(s): Stomach upset. Pt states OK with Percocet    Nickel sutures [surgical stainless steel] Dermatitis     Allergic contact dermatitis    Adhesive Rash     Past Medical History:   Diagnosis Date    Allergy     Amblyopia     Anemia     Anticoagulant long-term use     Arthritis 02/02/1992    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, estrogen receptor positive 04/13/2022    Cataract     Depression     Dry eyes     Dry mouth     Duane's syndrome of right eye     Early dry stage nonexudative age-related macular degeneration of both eyes 09/20/2022    Fibromyalgia 04/17/2014    Fibromyalgia     Fractured hip     RIGHT HIP    GERD (gastroesophageal reflux disease)     History of psychiatric hospitalization      Hyperlipidemia 1992    Hypertension     Hypocalcemia     Hyponatremia     Kidney stone     Migraine headache     Osteoporosis     Pressure ulcer of unspecified site, unspecified stage     Psoriatic arthritis 1992    Recurrent upper respiratory infection (URI)     Right knee pain     post knee replacement surgery (possible rejectiion of metal)    RLS (restless legs syndrome)     Schizophrenia 1992    stable on meds    Sciatica     Squamous cell carcinoma of skin     Urinary tract infection      Past Surgical History:   Procedure Laterality Date    brow ptosis repair Right 2019    Surgeon: Dr. Karla Henson    CATARACT EXTRACTION       SECTION      COLONOSCOPY N/A 2016    Procedure: COLONOSCOPY;  Surgeon: ANDRIA Connelly MD;  [facility]  Service: Endoscopy;  Laterality: N/A;    COLONOSCOPY N/A 2022    Procedure: COLONOSCOPY;  Surgeon: Mikey Walters MD;  Location: [facility];  Service: Endoscopy;  Laterality: N/A;    cyst removed from right sinus  1982    EPIDURAL STEROID INJECTION INTO CERVICAL SPINE N/A 2023    Procedure: C6-7 SOPHY;  Surgeon: Spring Jensen MD;  Location: [facility] PAIN [facility]NAGEMENT;  Service: Pain Management;  Laterality: N/A;  20 mins    EPIDURAL STEROID INJECTION INTO LUMBAR SPINE N/A 2023    Procedure: Injection-steroid-epidural-lumbar L5-S1;  Surgeon: Chirag Kim MD;  Location: [facility] PAIN [facility]NAGEMENT;  Service: Pain Management;  Laterality: N/A;  asa    EPIDURAL STEROID INJECTION INTO LUMBAR SPINE N/A 2024    Procedure: L5/S1 Interlaminar SOPHY;  Surgeon: Maile Storm DO;  Location: [facility] PAIN [facility]EMENT;  Service: Pain Management;  Laterality: N/A;  20mins-ASA 5d    ESOPHAGOGASTRODUODENOSCOPY N/A 2023    Procedure: EGD (ESOPHAGOGASTRODUODENOSCOPY);  Surgeon: Dougie Shea MD;  Location: [facility];  Service: Endoscopy;  Laterality: N/A;    FOOT FRACTURE SURGERY      HYSTERECTOMY      VAGINAL HYSTERECTOMY  WITHOUT BSO - ENDOMETRIOSIS    INJECTION FOR SENTINEL NODE IDENTIFICATION Right 05/09/2022    Procedure: INJECTION, FOR SENTINEL NODE IDENTIFICATION-Right;  Surgeon: NEAL Dhillon MD;  Location: [facility];  Service: General;  Laterality: Right;    INJECTION OF ANESTHETIC AGENT AROUND MEDIAL BRANCH NERVES INNERVATING LUMBAR FACET JOINT N/A 04/11/2019    Procedure: Lumbo-sacral Block, DR5 and Lateral Branches of S1,S2, S3;  Surgeon: Michelle Pineda Jr., MD;  Location: [facility] PAIN MGT;  Service: Pain Management;  Laterality: N/A;  Pt takes  and states she holds ASA on her own whenever she has procedures.  Instructed to hold x 3 days prior to procedure.      INJECTION OF ANESTHETIC AGENT AROUND MEDIAL BRANCH NERVES INNERVATING LUMBAR FACET JOINT Bilateral 05/03/2023    Procedure: Block-nerve-medial branch-lumbar bilateral L3, L4, L5;  Surgeon: Maile Storm DO;  Location: [facility]IN [facility]GT;  Service: Pain Management;  Laterality: Bilateral;  asa    INJECTION OF ANESTHETIC AGENT INTO SACROILIAC JOINT Right 08/30/2018    Procedure: BLOCK, SACROILIAC JOINT-Right- ORAL SEDATION;  Surgeon: Michelle Pineda Jr., MD;  Location: [facility] PAIN MG;  Service: Pain Management;  Laterality: Right;    INJECTION OF ANESTHETIC AGENT INTO SACROILIAC JOINT Bilateral 09/27/2018    Procedure: BLOCK, SACROILIAC JOINT-BILATERAL;  Surgeon: Michelle Pineda Jr., MD;  Location: [facility]GT;  Service: Pain Management;  Laterality: Bilateral;  Please keep at 10:00 due to trasnsportation    INJECTION OF ANESTHETIC AGENT INTO SACROILIAC JOINT Bilateral 02/07/2019    Procedure: Bilateral Sacroiliac Joint Injection - Per Dr Pineda, not necessary to hold ASA.;  Surgeon: Michelle Pnieda Jr., MD;  Location: [facility]IN MG;  Service: Pain Management;  Laterality: Bilateral;    INTRAOCULAR PROSTHESES INSERTION Right 08/01/2019    Procedure: INSERTION, IOL PROSTHESIS;  Surgeon: Karen Song MD;  [address];  Service:  Ophthalmology;  Laterality: Right;    INTRAOCULAR PROSTHESES INSERTION Left 09/26/2019    Procedure: INSERTION, IOL PROSTHESIS;  Surgeon: Karen Song MD;  Location: [facility] OR [address] [place] [address];  Service: Ophthalmology;  Laterality: Left;    JOINT REPLACEMENT Right     knee    KNEE SURGERY      MASTECTOMY Right 05/09/2022    Procedure: MASTECTOMY-Right;  Surgeon: NEAL Dhillon MD;  Location: [facility] OR;  Service: General;  Laterality: Right;  2.5 HOURS    PHACOEMULSIFICATION OF CATARACT Right 08/01/2019    Procedure: PHACOEMULSIFICATION, CATARACT;  Surgeon: Karen Song MD;  Location: [facility] OR [address] [place] [address];  Service: Ophthalmology;  Laterality: Right;    PHACOEMULSIFICATION OF CATARACT Left 09/26/2019    Procedure: PHACOEMULSIFICATION, CATARACT;  Surgeon: Karen Song MD;  Location: [facility] OR [address] [place] [address];  Service: Ophthalmology;  Laterality: Left;    RADIOFREQUENCY ABLATION, NERVE, PERIPHERAL Right 04/24/2024    Procedure: RIGHT L5 Dorsal Ramus and RIGHT Lateral Branches of S1, S2, and S3;  Surgeon: Maile Storm DO;  Location: [facility];  Service: Pain Management;  Laterality: Right;  30 mins    RADIOFREQUENCY THERMOCOAGULATION Right 05/07/2019    Procedure: RADIOFREQUENCY THERMAL COAGULATION RIGHT DORSAL RAMUS 5 AND LATERAL BRANCH OF S1, S2 AND S3;  Surgeon: Michelle Pineda Jr., MD;  [place] [facility]  Service: Pain Management;  Laterality: Right;    RADIOFREQUENCY THERMOCOAGULATION Left 05/14/2019    Procedure: RADIOFREQUENCY THERMAL COAGULATION LEFT DORSAL RAMUS 5 AND LATERAL BRANCH OF S1,S2 AND S3;  Surgeon: Michelle Pineda Jr., MD;  Location: [facility] PAIN [facility]GT;  Service: Pain Management;  Laterality: Left;    RADIOFREQUENCY THERMOCOAGULATION Right 10/22/2019    Procedure: RADIOFREQUENCY THERMAL COAGULATION---DARSAL RAMUS 5 and LATERAL S1,S2,and S3 Right;  Surgeon: Michelle Pineda Jr., MD;  Location: [facility] PAIN T;  Service: Pain Management;  Laterality: Right;  patient to sign consent DOS     RADIOFREQUENCY THERMOCOAGULATION Left 10/29/2019    Procedure: RADIOFREQUENCY THERMAL COAGULATION - LEFT - DR5, S1,S2, AND S3;  Surgeon: Michelle Pineda Jr., MD;  Location: [facility];  Service: Pain Management;  Laterality: Left;    RADIOFREQUENCY THERMOCOAGULATION Left 05/26/2020    Procedure: RADIOFREQUENCY THERMAL COAGULATION--Left DR5+ lateral branches of S1, S2, S3;  Surgeon: Michelle Pineda Jr., MD;  [place] [facility] [place] [facility]  Service: Pain Management;  Laterality: Left;    RADIOFREQUENCY THERMOCOAGULATION Right 06/02/2020    Procedure: RADIOFREQUENCY THERMAL COAGULATION--Right DR5+ lateral branches of S1, S2, S3;  Surgeon: Michelle Pineda Jr., MD;  Location: [facility];  Service: Pain Management;  Laterality: Right;    SENTINEL LYMPH NODE BIOPSY Right 05/09/2022    Procedure: BIOPSY, LYMPH NODE, SENTINEL-Right;  Surgeon: NEAL Dhillon MD;  Location: [facility];  Service: General;  Laterality: Right;    SINUS SURGERY      Surgery on right knee  07/09/1982    TONSILLECTOMY      tumor removed from back left side upper shoulder  03/17/2006     Family History   Problem Relation Name Age of Onset    Asthma Mother      Colon cancer Mother      Psoriasis Mother      Cancer Mother          colon    Depression Mother      Cancer Father          lymphoma    Stroke Sister      Diabetes Brother x2     Arthritis Brother x2     Heart disease Brother x2         cad    No Known Problems Daughter      Hypertension Neg Hx      Suicide Neg Hx      Amblyopia Neg Hx      Blindness Neg Hx      Cataracts Neg Hx      Glaucoma Neg Hx      Macular degeneration Neg Hx      Retinal detachment Neg Hx      Strabismus Neg Hx      Eczema Neg Hx      Allergies Neg Hx       Social History     Tobacco Use    Smoking status: Every Day     Current packs/day: 0.00     Average packs/day: 0.3 packs/day for 10.0 years (2.5 ttl pk-yrs)     Types: Cigarettes     Start date: 1/10/2013     Last attempt to quit: 1/10/2023     Years since  quittin.3     Passive exposure: Never    Smokeless tobacco: Former    Tobacco comments:     about 5 cig a day   Substance Use Topics    Alcohol use: No    Drug use: No     Review of Systems   Reason unable to perform ROS: See HPI for relevant ROS.       Physical Exam     Initial Vitals [246]   BP Pulse Resp Temp SpO2   (!) 161/66 71 19 97.9 °F (36.6 °C) 97 %      MAP       --         Physical Exam    Nursing note and vitals reviewed.  Constitutional:   Alert, normal work of breathing, no acute distress   Eyes: Conjunctivae are normal. No scleral icterus.   Cardiovascular:  Normal rate and regular rhythm.           Pulmonary/Chest: No stridor. No respiratory distress.   Abdominal: Abdomen is soft. She exhibits no distension. There is no abdominal tenderness.   Musculoskeletal:         General: No edema.      Comments: No significant midline tenderness to the C, T, or L-spine  Right-sided paraspinal lumbar tenderness with a focal area of nodularity/firm tissue, no palpable fluctuance, no erythema  Range of motion intact of bilateral lower extremities     Neurological: She is alert. She has normal strength. No sensory deficit.   Ambulatory  without assistance   Skin: Skin is warm and dry.         ED Course   Procedures  Labs Reviewed   CBC W/ AUTO DIFFERENTIAL - Abnormal; Notable for the following components:       Result Value    MCH 32.4 (*)     RDW 15.4 (*)     MPV 8.1 (*)     All other components within normal limits   URINALYSIS, REFLEX TO URINE CULTURE    Narrative:     Specimen Source->Urine   COMPREHENSIVE METABOLIC PANEL          Imaging Results              CT Lumbar Spine Without Contrast (Final result)  Result time 24 20:05:12      Final result by Tex Brennan DO (24 20:05:12)                   Impression:      No acute fracture or subluxation of the lumbar spine.    Multilevel degenerative changes of the lumbar spine as detailed above.  At L3-L4 there is a progressive left  paracentral/foraminal calcified disc extrusion resulting in severe lateral recess stenosis.      Electronically signed by: Tex Brennan  Date:    05/31/2024  Time:    20:05               Narrative:    EXAMINATION:  CT LUMBAR SPINE WITHOUT CONTRAST    CLINICAL HISTORY:  Low back pain, symptoms persist with > 6wks conservative treatment;S/p lumbar injection, eval underlying fluid collection;    TECHNIQUE:  Axial, sagittal, and coronal reformations are obtained through the lumbar spine without intravenous contrast.    COMPARISON:  Lumbar spine radiographs from 03/27/2024. MRI lumbar spine from 03/11/2023.    FINDINGS:  Alignment: There is lumbarization of S1 with a rudimentary S1-S2 disc.  There is S-shaped scoliotic curvature of the lumbar spine.  Alignment is otherwise unremarkable.    Vertebrae: There is no acute fracture or subluxation of the lumbar spine. Vertebral body heights are maintained. There is no aggressive osseous lesion.    Discs: There is diffuse mild to moderate disc height loss from L1 through S1.  There is vacuum disc phenomenon.    Degenerative changes:    T12-L1: There is no spinal canal stenosis or neural foraminal narrowing.    L1-L2: There is a circumferential disc bulge and mild bilateral facet arthropathy.  There is mild spinal canal stenosis.    L2-L3: There is mild bilateral facet arthropathy and ligamentum flavum hypertrophy and a mild posterior disc bulge resulting in mild spinal canal stenosis.    L3-L4: There is a large left paracentral/foraminal calcified disc extrusion which results in severe left lateral recess stenosis and contributes to at least moderate spinal canal stenosis.  This appears to have progressed from 03/11/2023.  There is moderate left neural foraminal narrowing and mild right neural foraminal narrowing.  There is moderate bilateral facet arthropathy.    L4-L5: There is moderate bilateral facet arthropathy.  There is a posterior disc bulge with a superimposed left  paracentral disc protrusion resulting in mild lateral recess stenosis.  There is mild bilateral neural foraminal narrowing.    L5-S1: There is a posterior disc bulge, ligamentum flavum hypertrophy, and facet arthropathy resulting in at least mild spinal canal stenosis and mild left, moderate right neural foraminal narrowing.    Miscellaneous: The paravertebral soft tissues are unremarkable.  There is colonic diverticulosis.  There are atherosclerotic calcifications.  There is a moderate volume of stool.                                       Medications   HYDROcodone-acetaminophen 5-325 mg per tablet 1 tablet (1 tablet Oral Given 5/31/24 2024)   acetaminophen tablet 500 mg (500 mg Oral Given 5/31/24 2023)   ketorolac injection 15 mg (15 mg Intramuscular Given 5/31/24 2023)     Medical Decision Making  68 y.o. female with arthritis, history of breast cancer, fibromyalgia, HLD, HTN, schizophrenia, history of migraines, chronic back pain, GERD presents for low back.  Differentials include lumbar radiculopathy, muscle strain, scar tissue, seroma, less likely abscess or acute spinal cord compression  Patient presenting with chronic low back pain, now slightly worse in the area of an epidural steroid injection.  She has a firm area of nodularity that feels like scar tissue, not fluctuant, no overlying erythema, no fever/chills, less consistent with abscess  No lower extremity numbness/neurologic deficits, patient ambulatory without assistance, doubt cauda equina/spinal cord compression  Patient treated symptomatically, CT ordered to evaluate for any underlying lesion/fluid collection  UA with no evidence of UTI.  CT without any evidence of fluid collection/seroma/abscess.  Does show degenerative changes, recommended close follow-up with spine surgery and her pain management doctors, strict return precautions given    Amount and/or Complexity of Data Reviewed  Labs: ordered.  Radiology: ordered.    Risk  OTC  drugs.  Prescription drug management.                                      Clinical Impression:  Final diagnoses:  [M54.40, G89.29] Chronic midline low back pain with sciatica, sciatica laterality unspecified (Primary)          ED Disposition Condition    Discharge Stable          ED Prescriptions       Medication Sig Dispense Start Date End Date Auth. Provider    LIDOcaine (LIDODERM) 5 % Place 1 patch onto the skin once daily. Remove & Discard patch within 12 hours or as directed by MD 14 patch 5/31/2024 -- Javier Goins MD          Follow-up Information       Follow up With Specialties Details Why Contact Info Additional Information    [facility] Schedule an appointment as soon as possible for a visit   [id number]  [place] [address] [phone] [address]             Javier Goins MD  06/01/24 2024

## 2024-06-03 ENCOUNTER — LAB VISIT (OUTPATIENT)
Dept: LAB | Facility: HOSPITAL | Age: 69
End: 2024-06-03
Attending: NURSE PRACTITIONER
Payer: MEDICARE

## 2024-06-03 ENCOUNTER — PATIENT OUTREACH (OUTPATIENT)
Dept: EMERGENCY MEDICINE | Facility: HOSPITAL | Age: 69
End: 2024-06-03
Payer: MEDICARE

## 2024-06-03 DIAGNOSIS — D50.9 IRON DEFICIENCY ANEMIA, UNSPECIFIED IRON DEFICIENCY ANEMIA TYPE: ICD-10-CM

## 2024-06-03 DIAGNOSIS — Z79.811 USE OF ANASTROZOLE: ICD-10-CM

## 2024-06-03 DIAGNOSIS — C50.411 CARCINOMA OF UPPER-OUTER QUADRANT OF RIGHT BREAST IN FEMALE, ESTROGEN RECEPTOR POSITIVE: ICD-10-CM

## 2024-06-03 DIAGNOSIS — Z17.0 CARCINOMA OF UPPER-OUTER QUADRANT OF RIGHT BREAST IN FEMALE, ESTROGEN RECEPTOR POSITIVE: ICD-10-CM

## 2024-06-03 LAB
ALBUMIN SERPL BCP-MCNC: 3.8 G/DL (ref 3.5–5.2)
ALP SERPL-CCNC: 67 U/L (ref 55–135)
ALT SERPL W/O P-5'-P-CCNC: 13 U/L (ref 10–44)
ANION GAP SERPL CALC-SCNC: 8 MMOL/L (ref 8–16)
AST SERPL-CCNC: 19 U/L (ref 10–40)
BILIRUB SERPL-MCNC: 0.2 MG/DL (ref 0.1–1)
BUN SERPL-MCNC: 3 MG/DL (ref 8–23)
CALCIUM SERPL-MCNC: 9.3 MG/DL (ref 8.7–10.5)
CHLORIDE SERPL-SCNC: 100 MMOL/L (ref 95–110)
CO2 SERPL-SCNC: 27 MMOL/L (ref 23–29)
CREAT SERPL-MCNC: 0.9 MG/DL (ref 0.5–1.4)
ERYTHROCYTE [DISTWIDTH] IN BLOOD BY AUTOMATED COUNT: 15.7 % (ref 11.5–14.5)
EST. GFR  (NO RACE VARIABLE): >60 ML/MIN/1.73 M^2
FERRITIN SERPL-MCNC: 115 NG/ML (ref 20–300)
GLUCOSE SERPL-MCNC: 92 MG/DL (ref 70–110)
HCT VFR BLD AUTO: 42.5 % (ref 37–48.5)
HGB BLD-MCNC: 14.8 G/DL (ref 12–16)
IMM GRANULOCYTES # BLD AUTO: 0.02 K/UL (ref 0–0.04)
IRON SERPL-MCNC: 81 UG/DL (ref 30–160)
MCH RBC QN AUTO: 34 PG (ref 27–31)
MCHC RBC AUTO-ENTMCNC: 34.8 G/DL (ref 32–36)
MCV RBC AUTO: 98 FL (ref 82–98)
NEUTROPHILS # BLD AUTO: 5.7 K/UL (ref 1.8–7.7)
PLATELET # BLD AUTO: 400 K/UL (ref 150–450)
PMV BLD AUTO: 8.5 FL (ref 9.2–12.9)
POTASSIUM SERPL-SCNC: 4.4 MMOL/L (ref 3.5–5.1)
PROT SERPL-MCNC: 6.3 G/DL (ref 6–8.4)
RBC # BLD AUTO: 4.35 M/UL (ref 4–5.4)
SATURATED IRON: 22 % (ref 20–50)
SODIUM SERPL-SCNC: 135 MMOL/L (ref 136–145)
TOTAL IRON BINDING CAPACITY: 374 UG/DL (ref 250–450)
TRANSFERRIN SERPL-MCNC: 253 MG/DL (ref 200–375)
WBC # BLD AUTO: 8.68 K/UL (ref 3.9–12.7)

## 2024-06-03 PROCEDURE — 85027 COMPLETE CBC AUTOMATED: CPT | Performed by: NURSE PRACTITIONER

## 2024-06-03 PROCEDURE — 36415 COLL VENOUS BLD VENIPUNCTURE: CPT | Mod: PO | Performed by: NURSE PRACTITIONER

## 2024-06-03 PROCEDURE — 83540 ASSAY OF IRON: CPT | Performed by: NURSE PRACTITIONER

## 2024-06-03 PROCEDURE — 80053 COMPREHEN METABOLIC PANEL: CPT | Performed by: NURSE PRACTITIONER

## 2024-06-03 PROCEDURE — 82728 ASSAY OF FERRITIN: CPT | Performed by: NURSE PRACTITIONER

## 2024-06-03 NOTE — PROGRESS NOTES
Patient was contacted for post ED discharge navigation assessment. They declined assistance with making follow-up appointments and denied any other needs/concerns.

## 2024-06-04 ENCOUNTER — PATIENT MESSAGE (OUTPATIENT)
Dept: RHEUMATOLOGY | Facility: CLINIC | Age: 69
End: 2024-06-04
Payer: MEDICARE

## 2024-06-04 ENCOUNTER — PATIENT MESSAGE (OUTPATIENT)
Dept: ORTHOPEDICS | Facility: CLINIC | Age: 69
End: 2024-06-04
Payer: MEDICARE

## 2024-06-05 ENCOUNTER — TELEPHONE (OUTPATIENT)
Dept: ORTHOPEDICS | Facility: CLINIC | Age: 69
End: 2024-06-05
Payer: MEDICARE

## 2024-06-05 DIAGNOSIS — M51.36 DDD (DEGENERATIVE DISC DISEASE), LUMBAR: Primary | ICD-10-CM

## 2024-06-05 NOTE — TELEPHONE ENCOUNTER
Attempt to contact patient regarding an appointment for back pain. Left message for patient to return call back to [phone]. Thanks.

## 2024-06-05 NOTE — TELEPHONE ENCOUNTER
Spoke to patient regarding an appointment for low back pain. Stated to patient that her appointment is scheduled for 1:30 pm and x-ray scheduled for 12:30 pm on 06/06/2024. Patient stated thank you. Thanks.

## 2024-06-05 NOTE — TELEPHONE ENCOUNTER
Spoke to patient regarding xray/ appointment. Patient rescheduled for 07/05/2024 x-ray for 1:00 and appointment for 2:00 pm . Patient stated thank you. Thanks.

## 2024-06-07 ENCOUNTER — OFFICE VISIT (OUTPATIENT)
Dept: PODIATRY | Facility: CLINIC | Age: 69
End: 2024-06-07
Payer: MEDICARE

## 2024-06-07 VITALS
BODY MASS INDEX: 19.65 KG/M2 | HEART RATE: 71 BPM | WEIGHT: 100.06 LBS | DIASTOLIC BLOOD PRESSURE: 70 MMHG | TEMPERATURE: 99 F | SYSTOLIC BLOOD PRESSURE: 140 MMHG | HEIGHT: 60 IN

## 2024-06-07 DIAGNOSIS — B35.1 TINEA UNGUIUM: ICD-10-CM

## 2024-06-07 DIAGNOSIS — G89.29 CHRONIC PAIN OF BOTH FEET: ICD-10-CM

## 2024-06-07 DIAGNOSIS — M21.41 PES PLANUS OF BOTH FEET: ICD-10-CM

## 2024-06-07 DIAGNOSIS — M76.821 POSTERIOR TIBIAL TENDON DYSFUNCTION (PTTD) OF BOTH LOWER EXTREMITIES: Primary | ICD-10-CM

## 2024-06-07 DIAGNOSIS — M79.672 CHRONIC PAIN OF BOTH FEET: ICD-10-CM

## 2024-06-07 DIAGNOSIS — M21.42 PES PLANUS OF BOTH FEET: ICD-10-CM

## 2024-06-07 DIAGNOSIS — M76.822 POSTERIOR TIBIAL TENDON DYSFUNCTION (PTTD) OF BOTH LOWER EXTREMITIES: Primary | ICD-10-CM

## 2024-06-07 DIAGNOSIS — M79.671 CHRONIC PAIN OF BOTH FEET: ICD-10-CM

## 2024-06-07 PROCEDURE — 99999 PR PBB SHADOW E&M-EST. PATIENT-LVL V: CPT | Mod: PBBFAC,,, | Performed by: STUDENT IN AN ORGANIZED HEALTH CARE EDUCATION/TRAINING PROGRAM

## 2024-06-07 PROCEDURE — 1159F MED LIST DOCD IN RCRD: CPT | Mod: CPTII,S$GLB,, | Performed by: STUDENT IN AN ORGANIZED HEALTH CARE EDUCATION/TRAINING PROGRAM

## 2024-06-07 PROCEDURE — 3077F SYST BP >= 140 MM HG: CPT | Mod: CPTII,S$GLB,, | Performed by: STUDENT IN AN ORGANIZED HEALTH CARE EDUCATION/TRAINING PROGRAM

## 2024-06-07 PROCEDURE — 11720 DEBRIDE NAIL 1-5: CPT | Mod: S$GLB,,, | Performed by: STUDENT IN AN ORGANIZED HEALTH CARE EDUCATION/TRAINING PROGRAM

## 2024-06-07 PROCEDURE — 4010F ACE/ARB THERAPY RXD/TAKEN: CPT | Mod: CPTII,S$GLB,, | Performed by: STUDENT IN AN ORGANIZED HEALTH CARE EDUCATION/TRAINING PROGRAM

## 2024-06-07 PROCEDURE — 99213 OFFICE O/P EST LOW 20 MIN: CPT | Mod: 25,S$GLB,, | Performed by: STUDENT IN AN ORGANIZED HEALTH CARE EDUCATION/TRAINING PROGRAM

## 2024-06-07 PROCEDURE — 3288F FALL RISK ASSESSMENT DOCD: CPT | Mod: CPTII,S$GLB,, | Performed by: STUDENT IN AN ORGANIZED HEALTH CARE EDUCATION/TRAINING PROGRAM

## 2024-06-07 PROCEDURE — 3078F DIAST BP <80 MM HG: CPT | Mod: CPTII,S$GLB,, | Performed by: STUDENT IN AN ORGANIZED HEALTH CARE EDUCATION/TRAINING PROGRAM

## 2024-06-07 PROCEDURE — 3008F BODY MASS INDEX DOCD: CPT | Mod: CPTII,S$GLB,, | Performed by: STUDENT IN AN ORGANIZED HEALTH CARE EDUCATION/TRAINING PROGRAM

## 2024-06-07 PROCEDURE — 1125F AMNT PAIN NOTED PAIN PRSNT: CPT | Mod: CPTII,S$GLB,, | Performed by: STUDENT IN AN ORGANIZED HEALTH CARE EDUCATION/TRAINING PROGRAM

## 2024-06-07 PROCEDURE — 1101F PT FALLS ASSESS-DOCD LE1/YR: CPT | Mod: CPTII,S$GLB,, | Performed by: STUDENT IN AN ORGANIZED HEALTH CARE EDUCATION/TRAINING PROGRAM

## 2024-06-07 NOTE — PROGRESS NOTES
Subjective:     Patient    Bhavna Landry is a 68 y.o. female.    Problem    08/24/23: Presents for thick discolored toenails which grow into her skin and cause pain. There is pain associated with the nails, worse at right 1st nail. The right 2nd toe recently split/crumbled. She has psoriasis and is unsure if her nail changes are related to psoriasis or fungus. Also reports left anterior ankle swelling.     06/07/24: Returns for routine foot care for painful fungal nails. Also now with progressive flatfoot deformity right worse than left, has attempted change in footwear and OTC arch supports without significant improvement.     History    History obtained from patient and review of medical records.     Past Medical History:   Diagnosis Date    Allergy     Amblyopia     Anemia     Anticoagulant long-term use     Arthritis 02/02/1992    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, estrogen receptor positive 04/13/2022    Cataract     Depression     Dry eyes     Dry mouth     Duane's syndrome of right eye     Early dry stage nonexudative age-related macular degeneration of both eyes 09/20/2022    Fibromyalgia 04/17/2014    Fibromyalgia     Fractured hip     RIGHT HIP    GERD (gastroesophageal reflux disease)     History of psychiatric hospitalization     Hyperlipidemia 02/02/1992    Hypertension     Hypocalcemia     Hyponatremia     Kidney stone     Migraine headache     Osteoporosis     Pressure ulcer of unspecified site, unspecified stage     Psoriatic arthritis 02/02/1992    Recurrent upper respiratory infection (URI)     Right knee pain     post knee replacement surgery (possible rejectiion of metal)    RLS (restless legs syndrome)     Schizophrenia 02/02/1992    stable on meds    Sciatica     Squamous cell carcinoma of skin     Urinary tract infection        Past Surgical History:   Procedure Laterality Date    brow ptosis repair Right 03/04/2019    Surgeon: Dr. Karla Henson    CATARACT  EXTRACTION       SECTION      COLONOSCOPY N/A 2016    Procedure: COLONOSCOPY;  Surgeon: ANDRIA Connelly MD;  Location: [facility] ENDO ([facility] FLR);  Service: Endoscopy;  Laterality: N/A;    COLONOSCOPY N/A 2022    Procedure: COLONOSCOPY;  Surgeon: Mikey Walters MD;  Location: [facility] ENDO;  Service: Endoscopy;  Laterality: N/A;    cyst removed from right sinus  1982    EPIDURAL STEROID INJECTION INTO CERVICAL SPINE N/A 2023    Procedure: C6-7 SOPHY;  Surgeon: Spring Jensen MD;  Location: [facility] PAIN MANAGEMENT;  Service: Pain Management;  Laterality: N/A;  20 mins    EPIDURAL STEROID INJECTION INTO LUMBAR SPINE N/A 2023    Procedure: Injection-steroid-epidural-lumbar L5-S1;  Surgeon: Chirag Kim MD;  Location: [facility] PAIN MANAGEMENT;  Service: Pain Management;  Laterality: N/A;  asa    EPIDURAL STEROID INJECTION INTO LUMBAR SPINE N/A 2024    Procedure: L5/S1 Interlaminar SOPHY;  Surgeon: Maile Storm DO;  Location: [facility] PAIN MANAGEMENT;  Service: Pain Management;  Laterality: N/A;  20mins-ASA 5d    ESOPHAGOGASTRODUODENOSCOPY N/A 2023    Procedure: EGD (ESOPHAGOGASTRODUODENOSCOPY);  Surgeon: Dougie Shea MD;  Location: [facility];  Service: Endoscopy;  Laterality: N/A;    FOOT FRACTURE SURGERY      HYSTERECTOMY      VAGINAL HYSTERECTOMY WITHOUT BSO - ENDOMETRIOSIS    INJECTION FOR SENTINEL NODE IDENTIFICATION Right 2022    Procedure: INJECTION, FOR SENTINEL NODE IDENTIFICATION-Right;  Surgeon: NEAL Dhillon MD;  Location: [facility] OR;  Service: General;  Laterality: Right;    INJECTION OF ANESTHETIC AGENT AROUND MEDIAL BRANCH NERVES INNERVATING LUMBAR FACET JOINT N/A 2019    Procedure: Lumbo-sacral Block, DR5 and Lateral Branches of S1,S2, S3;  Surgeon: Michelle Pineda Jr., MD;  Location: [facility] PAIN MGT;  Service: Pain Management;  Laterality: N/A;  Pt takes  and states she holds ASA on her own whenever she has procedures.  Instructed to hold x 3 days  prior to procedure.      INJECTION OF ANESTHETIC AGENT AROUND MEDIAL BRANCH NERVES INNERVATING LUMBAR FACET JOINT Bilateral 05/03/2023    Procedure: Block-nerve-medial branch-lumbar bilateral L3, L4, L5;  Surgeon: Maile Storm DO;  Location: [facility] PAIN [facility];  Service: Pain Management;  Laterality: Bilateral;  asa    INJECTION OF ANESTHETIC AGENT INTO SACROILIAC JOINT Right 08/30/2018    Procedure: BLOCK, SACROILIAC JOINT-Right- ORAL SEDATION;  Surgeon: Michelle Pineda Jr., MD;  Location: [facility] PAIN [facility];  Service: Pain Management;  Laterality: Right;    INJECTION OF ANESTHETIC AGENT INTO SACROILIAC JOINT Bilateral 09/27/2018    Procedure: BLOCK, SACROILIAC JOINT-BILATERAL;  Surgeon: Michelle Pineda Jr., MD;  Location: [facility];  Service: Pain Management;  Laterality: Bilateral;  Please keep at 10:00 due to trasnsportation    INJECTION OF ANESTHETIC AGENT INTO SACROILIAC JOINT Bilateral 02/07/2019    Procedure: Bilateral Sacroiliac Joint Injection - Per Dr Pineda, not necessary to hold ASA.;  Surgeon: Michelle Pineda Jr., MD;  Location: [facility];  Service: Pain Management;  Laterality: Bilateral;    INTRAOCULAR PROSTHESES INSERTION Right 08/01/2019    Procedure: INSERTION, IOL PROSTHESIS;  Surgeon: Karen Song MD;  Location: [address];  Service: Ophthalmology;  Laterality: Right;    INTRAOCULAR PROSTHESES INSERTION Left 09/26/2019    Procedure: INSERTION, IOL PROSTHESIS;  Surgeon: Karen Song MD;  Location: [address];  Service: Ophthalmology;  Laterality: Left;    JOINT REPLACEMENT Right     knee    KNEE SURGERY      MASTECTOMY Right 05/09/2022    Procedure: MASTECTOMY-Right;  Surgeon: NEAL Dhillon MD;  [facility];  Service: General;  Laterality: Right;  2.5 HOURS    PHACOEMULSIFICATION OF CATARACT Right 08/01/2019    Procedure: PHACOEMULSIFICATION, CATARACT;  Surgeon: Karen Song MD;  Location: [address];  Service: Ophthalmology;  Laterality: Right;     PHACOEMULSIFICATION OF CATARACT Left 09/26/2019    Procedure: PHACOEMULSIFICATION, CATARACT;  Surgeon: Karen Song MD;  Location: [facility] OR [address] [place] [address];  Service: Ophthalmology;  Laterality: Left;    RADIOFREQUENCY ABLATION, NERVE, PERIPHERAL Right 04/24/2024    Procedure: RIGHT L5 Dorsal Ramus and RIGHT Lateral Branches of S1, S2, and S3;  Surgeon: Maile Storm DO;  Location: [facility] PAIN MANAGEMENT;  Service: Pain Management;  Laterality: Right;  30 mins    RADIOFREQUENCY THERMOCOAGULATION Right 05/07/2019    Procedure: RADIOFREQUENCY THERMAL COAGULATION RIGHT DORSAL RAMUS 5 AND LATERAL BRANCH OF S1, S2 AND S3;  Surgeon: Michelle Pineda Jr., MD;  Location: [facility];  Service: Pain Management;  Laterality: Right;    RADIOFREQUENCY THERMOCOAGULATION Left 05/14/2019    Procedure: RADIOFREQUENCY THERMAL COAGULATION LEFT DORSAL RAMUS 5 AND LATERAL BRANCH OF S1,S2 AND S3;  Surgeon: Michelle Pineda Jr., MD;  [facility];  Service: Pain Management;  Laterality: Left;    RADIOFREQUENCY THERMOCOAGULATION Right 10/22/2019    Procedure: RADIOFREQUENCY THERMAL COAGULATION---DARSAL RAMUS 5 and LATERAL S1,S2,and S3 Right;  Surgeon: Michelle Pineda Jr., MD;  Location: [facility];  Service: Pain Management;  Laterality: Right;  patient to sign consent DOS    RADIOFREQUENCY THERMOCOAGULATION Left 10/29/2019    Procedure: RADIOFREQUENCY THERMAL COAGULATION - LEFT - DR5, S1,S2, AND S3;  Surgeon: Michelle Pineda Jr., MD;  Location: [facility] PAIN T;  Service: Pain Management;  Laterality: Left;    RADIOFREQUENCY THERMOCOAGULATION Left 05/26/2020    Procedure: RADIOFREQUENCY THERMAL COAGULATION--Left DR5+ lateral branches of S1, S2, S3;  Surgeon: Michelle Pineda Jr., MD;  Location: [facility] PAIN T;  Service: Pain Management;  Laterality: Left;    RADIOFREQUENCY THERMOCOAGULATION Right 06/02/2020    Procedure: RADIOFREQUENCY THERMAL COAGULATION--Right DR5+ lateral branches of S1, S2, S3;  Surgeon: Michelle MERRITT  Edwin Bergman MD;  Location: [facility] PAIN MGT;  Service: Pain Management;  Laterality: Right;    SENTINEL LYMPH NODE BIOPSY Right 05/09/2022    Procedure: BIOPSY, LYMPH NODE, SENTINEL-Right;  Surgeon: NEAL Dhillon MD;  Location: [facility];  Service: General;  Laterality: Right;    SINUS SURGERY      Surgery on right knee  07/09/1982    TONSILLECTOMY      tumor removed from back left side upper shoulder  03/17/2006        Objective:     Vitals  Wt Readings from Last 1 Encounters:   06/07/24 45.4 kg (100 lb 1.4 oz)     Temp Readings from Last 1 Encounters:   06/07/24 98.9 °F (37.2 °C) (Oral)     BP Readings from Last 1 Encounters:   06/07/24 (!) 140/70     Pulse Readings from Last 1 Encounters:   06/07/24 71       Dermatological Exam    Skin:  Pedal hair growth, skin color, and skin texture normal on right  Pedal hair growth, skin color, and skin texture normal on left    Nails:  Bilateral 1st and right 2nd nail(s) thickened and bilateral 1st  nail(s) discolored; pain on palpation of nails, worse at medial and lateral borders of 1st toenails      Vascular Exam    Arteries:  Posterior tibial artery palpable on right  Dorsalis pedis artery palpable on right  Posterior tibial artery palpable on left  Dorsalis pedis artery palpable on left    Veins:  Telangectasia on right  Telangectasia on left    Swelling:  None on right  1+ pitting on left at ankle    Neurological Exam    Milton touch test:  6/6 sites sensed, light touch intact     Musculoskeletal Exam    Footwear:  Casual on right  Casual on left    Gait Exam:   Ambulatory Status: Ambulatory  Gait: Apropulsive  Assistive Devices: None    Foot Progression Angle:  Normal on right  Normal on left     Right Lower Extremity Additional Findings:  Pes planus, moderate pain on palpation of navicular and distal PT tendon  Right foot and ankle function, strength, and range of motion unremarkable except as noted above.     Left Lower Extremity Additional Findings:  Pes planus,  moderate pain on palpation of navicular and distal PT tendon  Left foot and ankle function, strength, and range of motion unremarkable except as noted above.    Imaging and Other Tests    Imagin23 L ankle X ray: ankle unremarkable, there is dorsal foot arthritis from talus to cuneiforms.     Independently reviewed and interpreted imaging, findings are as follows: N/A     Assessment:     Encounter Diagnoses   Name Primary?    Posterior tibial tendon dysfunction (PTTD) of both lower extremities Yes    Chronic pain of both feet     Pes planus of both feet         Plan:     I counseled the patient on her conditions, their implications and medical management.    Tinea unguium, pain: chronic stable   -Discussed treatment options including (1) monitoring, (2) debridement, (3) topical antifungals, (4) oral antifungals. Discussed potential risks, benefits, alternatives to each. Patient opted for topical antifungals and debridement.  -Nails debrided x3, thickness and length reduced using sterile nail nippers, see procedure note. Other nails trimmed as a courtesy.    -Continue topical ciclopirox.    Posterior tibial tendon dysfunction, pes planus, pain: chronic exacerbated  -Ordered bilateral custom foot orthoses.     Return to clinic in 2.5 months for routine foot care, call sooner PRN.

## 2024-06-07 NOTE — PROCEDURES
"Routine Foot Care    Date/Time: 6/7/2024 3:30 PM    Performed by: Walt Zhang DPM  Authorized by: Walt Zhang DPM    Time out: Immediately prior to procedure a "time out" was called to verify the correct patient, procedure, equipment, support staff and site/side marked as required.    Consent Done?:  Yes (Verbal)  Hyperkeratotic Skin Lesions?: No      Nail Care Type:  Debride(Right 1st Toe, Right 2nd Toe and Left 1st Toe)  Patient tolerance:  Patient tolerated the procedure well with no immediate complications    "

## 2024-06-10 ENCOUNTER — PATIENT MESSAGE (OUTPATIENT)
Dept: INTERNAL MEDICINE | Facility: CLINIC | Age: 69
End: 2024-06-10
Payer: MEDICARE

## 2024-06-14 ENCOUNTER — HOSPITAL ENCOUNTER (OUTPATIENT)
Dept: RADIOLOGY | Facility: HOSPITAL | Age: 69
Discharge: HOME | End: 2024-06-14
Attending: INTERNAL MEDICINE
Payer: MEDICARE

## 2024-06-14 DIAGNOSIS — Z12.31 ENCOUNTER FOR SCREENING MAMMOGRAM FOR MALIGNANT NEOPLASM OF BREAST: ICD-10-CM

## 2024-06-14 DIAGNOSIS — Z17.0 CARCINOMA OF UPPER-OUTER QUADRANT OF RIGHT BREAST IN FEMALE, ESTROGEN RECEPTOR POSITIVE: ICD-10-CM

## 2024-06-14 DIAGNOSIS — C50.411 CARCINOMA OF UPPER-OUTER QUADRANT OF RIGHT BREAST IN FEMALE, ESTROGEN RECEPTOR POSITIVE: ICD-10-CM

## 2024-06-14 PROCEDURE — 77067 SCR MAMMO BI INCL CAD: CPT | Mod: 26,52,, | Performed by: RADIOLOGY

## 2024-06-14 PROCEDURE — 77067 SCR MAMMO BI INCL CAD: CPT | Mod: TC,52

## 2024-06-14 PROCEDURE — 77063 BREAST TOMOSYNTHESIS BI: CPT | Mod: 26,52,, | Performed by: RADIOLOGY

## 2024-06-14 NOTE — PROGRESS NOTES
DATE: 7/5/2024  PATIENT: Bhavna Landry    Attending Physician: Binh Ho M.D.    HISTORY:  Bhavna Landry is a 68 y.o. female who returns to me today for follow up.  She was last seen by me 10/4/2023.  Today she is doing well but notes right posterior leg pain. The pain began in March. She is scheduled if a right TF SOPHY on 7/17/24 with pain mgmt. She is currently taking Lyrica nightly along with Clear.   The Patient denies myelopathic symptoms such as handwriting changes or difficulty with buttons/coins/keys. Denies perineal paresthesias, bowel/bladder dysfunction.    PMH/PSH/FamHx/SocHx:  Unchanged from prior visit    EXAM:  There were no vitals taken for this visit.    My physical examination was notable for the following findings:     Normal station and gait, no difficulty with toe or heel walk.   Dorsal lumbar skin negative for rashes, lesions, hairy patches and surgical scars. There is mild lumbar tenderness to palpation.  Lumbar range of motion is acceptable.  Global saggital and coronal spinal balance acceptable, not significant for scoliosis and kyphosis.  No pain with the range of motion of the bilateral hips. No trochanteric tenderness to palpation.  Bilateral lower extremities warm and well perfused, dorsalis pedis pulses 2+ bilaterally.  Normal strength and tone in all major motor groups in the bilateral lower extremities. Normal sensation to light touch in the L2-S1 dermatomes bilaterally.  Deep tendon reflexes symmetric 2+ in the bilateral lower extremities.  Negative Babinski bilaterally. Straight leg raise negative bilaterally.      IMAGING:    Today I personally re- reviewed AP, Lat and Flex/Ex  upright L-spine that demonstrate dextroconvex curvature.  DJD between C3 and C7. There is instability of C2 on C3; C3 on C5 and C5 on C6.       Cervical MRI shows moderate spinal canal stenosis noted at C3-C4 and C5-C6.  Moderate to severe neural foraminal narrowing noted at C3-C4 and  C5-T1.    Lumbar xray shows dextroconvex curve with moderate DDD throughout.     There is no height or weight on file to calculate BMI.    Hemoglobin A1C   Date Value Ref Range Status   12/11/2018 5.3 4.0 - 5.6 % Final     Comment:     ADA Screening Guidelines:  5.7-6.4%  Consistent with prediabetes  >or=6.5%  Consistent with diabetes  High levels of fetal hemoglobin interfere with the HbA1C  assay. Heterozygous hemoglobin variants (HbS, HgC, etc)do  not significantly interfere with this assay.   However, presence of multiple variants may affect accuracy.     05/26/2014 <4.0 (L) 4.5 - 6.2 % Final   10/26/2009 3.9 (L) 4.0 - 6.2 % Final         ASSESSMENT/PLAN:    Bhavna was seen today for low-back pain.    Diagnoses and all orders for this visit:    Juvenile idiopathic scoliosis of thoracolumbar region  -     meloxicam (MOBIC) 7.5 MG tablet; Take 1 tablet (7.5 mg total) by mouth once daily.  -     pregabalin (LYRICA) 50 MG capsule; Take 1 capsule (50 mg total) by mouth 3 (three) times daily.    Lumbar radiculopathy  -     meloxicam (MOBIC) 7.5 MG tablet; Take 1 tablet (7.5 mg total) by mouth once daily.  -     pregabalin (LYRICA) 50 MG capsule; Take 1 capsule (50 mg total) by mouth 3 (three) times daily.    Dorsalgia, unspecified  -     MRI Lumbar Spine Without Contrast; Future      TFESI scheduled for 7/17.  MRI ordered, I will call with results.  Lyrica dose increased

## 2024-06-20 NOTE — H&P (VIEW-ONLY)
[facility] -  Established Patient Evaluation    Chief Complaint  No chief complaint on file.          4/2/2024     1:05 PM 5/22/2023     1:55 PM 3/14/2023     2:10 PM   Last 3 PDI Scores   Pain Disability Index (PDI) 36 11 22     Interval Update 06/21/2024: Patient return to clinic for follow up on back pain.  She has status post a lumbar SOPHY targeting L5-S1 on 05/22/2024 reporting 30% relief of her symptoms her daughter who is present with her did report that she was able to walk around her home without using her walker following this injection.  Despite the percentage of relief being low.  Continues to suffer from chronic right low back and right leg pain her pain starts in the low right low back radiating into her right buttocks wrapping around to the front of her right leg into her toes.  She denies any recent incident or trauma denies any falls denies any profound weakness.  She was also previously provided a right-sided sacral lateral branch RFA and was scheduled to have the left side RFA done however her right sciatic pain has taking over her SI joint/hip pain.  I will focus my energy on trying to alleviate her right-sided sciatica type pain.  She denies taking gabapentin anymore as she felt like the medicine was causing her ankles swell.  Today she denies any profound weakness denies any bowel bladder dysfunction denies saddle anesthesia at this time.    Interval History 05/02/2024  Bhavna Landry presents today virtually for follow up her pain back.  She recently underwent right sided sacral lateral branch RFA and was scheduled to have left sided RFA performed, but her sciatica pain has flared up in the interim.  She would like to hold off on the left sacral lateral branch RFA as her right-sided radicular symptoms are more severe.  Pain radiates down the right lower extremity to the top of the foot.  She needs to use a walker to ambulate due to pain.  She has a history of  lumbar radiculopathy and underwent L5/S1 interlaminar SOPHY in 08/2023 with excellent results.  She reports her current radicular symptoms are the same as prior.  She would like to repeat the lumbar epidural steroid injection.  She is currently taking gabapentin which is prescribed by her psychiatrist.  She denies any weakness in the lower extremities, saddle anesthesia, or loss of bowel or bladder function.  She currently rates her pain 8/10.       Interval History 04/02/2024:  Bhavna Landry presents today for follow up of her low back pain.  Her low back/sacral pain has returned.  In the past she has responded well to bilateral L5 Dorsal Ramus and Lateral Branches of S1, S2, and S3 RFA.  This is previously given her significant relief lasting several years.  She would like to repeat this procedure.  Denies any recent falls or trauma.  No changes to her pain.  Denies any radicular symptoms.  No weakness, saddle anesthesia, bowel or bladder changes.     Interval History (05/22/2023):  Bhavna Landry returns today for follow up she is s/p a a diagnostic Lumbar MBB targeting L4/5 and L5/S1 that  provided 0% relief of her low back and leg pain R>L. She would like to consider other injection that may help. She denies and new pain, denies B/B dysfunction, denies any profound weakness.     Current Pain Scales:  Current: 5/10              Typical Range: 5-9/10     Interval History (03/14/2023):  Bhavna Landry returns today for low back pain that radiates into the b/l thighs.  Pain is described as clenching pain that has worsened over last 2 years. No trauma or surgery.  Pain is worse with extension.  Pain is better with heat, lying down, gabapentin, tylenol and tramadol.  She is scheduled to start PT next week.  Currently, the bilateral hip and bilateral leg pain is stable.  Avoid NSAIDs due to history of gastric ulcer.     Current Pain Scales:  Current: 7/10              Typical Range: 7-8/10     Background from Chart  "Review  9/16/2020- Mrs. Landry presents to clinic today reporting left hip pain for the past 3-4 mos, which has not improved with PT.  Pain starts in the lateral left hip "on the bone" and radiates through the buttock, down the lateral thigh not passing the knee, and into the anterolateral thigh.  She endorses regular stretching and states that this pain is distinctly different from the SI pain for which she received SI RFA in the past.     6/09/2020-   64-year-old female presents status post left then right  DR 5+ lateral branches of the S1, S2 and S3 RFA with reported 100% continued  relief she reports that her pain score today is 0/10.  She reports improvement with her ADLs and overall improvement of her functionality.     05/05/2020  presents tele-medicine appointment for a follow-up appointment for low back, left groin and bilateral hip pain.  Since the last visit, Bhavna Landry states the pain has been persistant. Current pain intensity is 9/10.  Patient reports onset of pain 2 weeks, patient states bending forward and sitting for long periods of time increase her pain.  Pain is described as aching.  She reports no radicular symptoms in either leg.  Worst pain is not out of 10.  Patient is status post right and left L5 Dorsal Ramus and Lateral Branches of S1, S2, and S3 (4 levels) RFA reported 80-90% relief for a minimal 6 months.  Pain is now returned and patient like to reschedule procedure.        Treatment History  PT/OT: yes  HEP: yes  TENS:    Injections:   -05/22/2024 Lumbar Interlaminar Epidural Steroid Injection L5/S1 30%  Bilateral SI joint injections, bilateral  Dorsal ramus block(DR5, S1,S2,S3),  bilateral L5 Dorsal Ramus and Lateral Branches of S1, S2, and S3 (4 levels) RFA, GTB injections   12/08/2023 - Cervical Interlaminar Epidural Steroid Injection C6/C7 under Fluoroscopic Guidance   08//02/2023 -  Lumbar Interlaminar Epidural Steroid Injection L5/S1   05/03/2023 -  Diagnostic Bilateral L3, " L4, and L5 Lumbar Medial Branch Block under Fluoroscopy  - No relief    Surgery:  Medications:   - NSAIDS: avoid due to gastric ulcer   - MSK Relaxants:   - TCAs:   - SNRIs: Venlafaxine  - Topicals: Voltaren  - Opioids: Tramadol   - Anticonvulsants: Gabapentin    Current Pain Medications  Gabapentin  Tramadol      Full Medication List    Current Outpatient Medications:     albuterol (PROVENTIL) 2.5 mg /3 mL (0.083 %) nebulizer solution, Take 3 mLs (2.5 mg total) by nebulization every 6 (six) hours as needed for Wheezing. Rescue, Disp: 90 each, Rfl: 2    albuterol (PROVENTIL/VENTOLIN HFA) 90 mcg/actuation inhaler, USE 2 INHALATIONS BY MOUTH 4  TIMES DAILY AS NEEDED, Disp: 34 g, Rfl: 3    amLODIPine (NORVASC) 5 MG tablet, Take 1 tablet (5 mg total) by mouth 2 (two) times a day., Disp: 180 tablet, Rfl: 1    ascorbic acid, vitamin C, (VITAMIN C) 500 MG tablet, Take 500 mg by mouth once daily., Disp: , Rfl:     aspirin (ECOTRIN) 81 MG EC tablet, Take 81 mg by mouth once daily., Disp: , Rfl:     atorvastatin (LIPITOR) 80 MG tablet, TAKE 1 TABLET(80 MG) BY MOUTH EVERY DAY, Disp: 90 tablet, Rfl: 0    baclofen (LIORESAL) 10 MG tablet, 1 tab po tid for muscle cramps., Disp: 60 tablet, Rfl: 1    benztropine (COGENTIN) 0.5 MG tablet, TAKE 1 TABLET(0.5 MG) BY MOUTH TWICE DAILY, Disp: 180 tablet, Rfl: 3    budesonide-formoterol 80-4.5 mcg (SYMBICORT) 80-4.5 mcg/actuation HFAA, Inhale 2 puffs into the lungs 2 (two) times a day., Disp: 6.9 g, Rfl: 11    ciclopirox (PENLAC) 8 % Soln, Apply topically nightly. Paint on affected nail(s) once per night, remove residue once per week with alcohol, Disp: 6.6 mL, Rfl: 3    cloNIDine (CATAPRES) 0.1 MG tablet, 1 tab po q day for systolic BP > 160 or DBP >100., Disp: 30 tablet, Rfl: 1    exemestane (AROMASIN) 25 mg tablet, Take 1 tablet (25 mg total) by mouth once daily., Disp: 30 tablet, Rfl: 11    fluticasone (VERAMYST) 27.5 mcg/actuation nasal spray, 2 sprays by Nasal route as needed for  Rhinitis., Disp: , Rfl:     fluticasone propionate (FLONASE) 50 mcg/actuation nasal spray, 1 spray by Each Nostril route once daily., Disp: , Rfl:     folic acid (FOLVITE) 1 MG tablet, Take 1 tablet (1,000 mcg total) by mouth once daily., Disp: 90 tablet, Rfl: 3    gabapentin (NEURONTIN) 100 MG capsule, Take 1 capsule (100 mg total) by mouth 3 (three) times daily., Disp: 270 capsule, Rfl: 3    HYDROcodone-acetaminophen (NORCO) 5-325 mg per tablet, Take 1 tablet by mouth every 12 (twelve) hours as needed for Pain., Disp: 60 tablet, Rfl: 0    isosorbide mononitrate (IMDUR) 30 MG 24 hr tablet, Take 1 tablet (30 mg total) by mouth once daily., Disp: 30 tablet, Rfl: 3    LIDOcaine (LIDODERM) 5 %, Place 1 patch onto the skin once daily. Remove & Discard patch within 12 hours or as directed by MD, Disp: 14 patch, Rfl: 0    losartan (COZAAR) 25 MG tablet, TAKE 1 TABLET EVERY DAY AS NEEDED FOR.WHEN SYSTOLIC BLOOD PRESSURE ABOVE 150, Disp: 90 tablet, Rfl: 1    methotrexate 2.5 MG Tab, TAKE 8 TABLETS BY MOUTH EVERY 7 DAYS. TAKE 4 TABLETS MONDAY MORNING AND TAKE 4 TABLETS MOND NIGHT ONLY, Disp: 144 tablet, Rfl: 0    omega-3 fatty acids/fish oil (FISH OIL-OMEGA-3 FATTY ACIDS) 300-1,000 mg capsule, Take by mouth once daily., Disp: , Rfl:     omeprazole (PRILOSEC) 20 MG capsule, TAKE 1 CAPSULE(20 MG) BY MOUTH EVERY DAY, Disp: 30 capsule, Rfl: 11    omeprazole (PRILOSEC) 40 MG capsule, Take 1 capsule (40 mg total) by mouth every morning., Disp: 90 capsule, Rfl: 3    risperiDONE (RISPERDAL) 2 MG tablet, TAKE 1 TABLET(2 MG) BY MOUTH EVERY MORNING, Disp: 90 tablet, Rfl: 3    risperiDONE (RISPERDAL) 4 MG tablet, TAKE 1 TABLET(4 MG) BY MOUTH EVERY EVENING, Disp: 90 tablet, Rfl: 3    venlafaxine (EFFEXOR-XR) 75 MG 24 hr capsule, Take 1 capsule (75 mg total) by mouth once daily., Disp: 90 capsule, Rfl: 3    vitamin D (VITAMIN D3) 1000 units Tab, Take 1,000 Units by mouth once daily., Disp: , Rfl:     vitamin E 200 UNIT capsule, Take 200  Units by mouth once daily., Disp: , Rfl:     zinc gluconate 50 mg tablet, Take 50 mg by mouth once daily., Disp: , Rfl:   No current facility-administered medications for this visit.    Facility-Administered Medications Ordered in Other Visits:     tropicamide 1% ophthalmic solution 1 drop, 1 drop, Right Eye, On Call Procedure, Karen Song MD, 1 drop at 08/01/19 0648     Review of Systems  ROS  REVIEW OF SYSTEMS:    GENERAL:  No weight loss, malaise or fevers.  HEENT:   No recent changes in vision or hearing  NECK:  No difficulty with swallowing. No stridor.   RESPIRATORY:  Negative for cough, wheezing or shortness of breath, patient denies any recent URI.  CARDIOVASCULAR:  Negative for chest pain, leg swelling or palpitations. +HTN  GI:  Negative for abdominal discomfort, blood in stools or black stools or change in bowel habits.  MUSCULOSKELETAL:  See HPI.  SKIN:  Negative for lesions, rash, and itching.  PSYCH:  No mood disorder or recent psychosocial stressors.   +anxiety +depression   HEMATOLOGY/LYMPHOLOGY:  Negative for prolonged bleeding, bruising easily or swollen nodes.  Patient is not currently taking any anti-coagulants  NEURO:   No history of headaches, syncope, paralysis, seizures or tremors.  All other reviewed and negative other than HPI.      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, estrogen receptor positive 04/13/2022    Cataract     Depression     Dry eyes     Dry mouth     Duane's syndrome of right eye     Early dry stage nonexudative age-related macular degeneration of both eyes 09/20/2022    Fibromyalgia 04/17/2014    Fibromyalgia     Fractured hip     RIGHT HIP    GERD (gastroesophageal reflux disease)     History of psychiatric hospitalization     Hyperlipidemia 02/02/1992    Hypertension     Hypocalcemia      Hyponatremia     Kidney stone     Migraine headache     Osteoporosis     Pressure ulcer of unspecified site, unspecified stage     Psoriatic arthritis 1992    Recurrent upper respiratory infection (URI)     Right knee pain     post knee replacement surgery (possible rejectiion of metal)    RLS (restless legs syndrome)     Schizophrenia 1992    stable on meds    Sciatica     Squamous cell carcinoma of skin     Urinary tract infection         Surgical History  Past Surgical History:   Procedure Laterality Date    brow ptosis repair Right 2019    Surgeon: Dr. Karla Henson    CATARACT EXTRACTION       SECTION      COLONOSCOPY N/A 2016    Procedure: COLONOSCOPY;  Surgeon: ANDRIA Connelly MD;  Location: [facility] ([address] [place] [address]);  Service: Endoscopy;  Laterality: N/A;    COLONOSCOPY N/A 2022    Procedure: COLONOSCOPY;  Surgeon: Mikey Walters MD;  Location: [facility];  Service: Endoscopy;  Laterality: N/A;    cyst removed from right sinus  1982    EPIDURAL STEROID INJECTION INTO CERVICAL SPINE N/A 2023    Procedure: C6-7 SOPHY;  Surgeon: Spring Jensen MD;  [facility];  Service: Pain Management;  Laterality: N/A;  20 mins    EPIDURAL STEROID INJECTION INTO LUMBAR SPINE N/A 2023    Procedure: Injection-steroid-epidural-lumbar L5-S1;  Surgeon: Chirag Kim MD;  [facility];  Service: Pain Management;  Laterality: N/A;  asa    EPIDURAL STEROID INJECTION INTO LUMBAR SPINE N/A 2024    Procedure: L5/S1 Interlaminar SOPHY;  Surgeon: Maile Storm DO;  [facility];  Service: Pain Management;  Laterality: N/A;  20mins-ASA 5d    ESOPHAGOGASTRODUODENOSCOPY N/A 2023    Procedure: EGD (ESOPHAGOGASTRODUODENOSCOPY);  Surgeon: Dougie Shea MD;  Location: [facility];  Service: Endoscopy;  Laterality: N/A;    FOOT FRACTURE SURGERY      HYSTERECTOMY      VAGINAL HYSTERECTOMY WITHOUT BSO - ENDOMETRIOSIS    INJECTION  FOR SENTINEL NODE IDENTIFICATION Right 05/09/2022    Procedure: INJECTION, FOR SENTINEL NODE IDENTIFICATION-Right;  Surgeon: NEAL Dhillon MD;  Location: [facility] OR;  Service: General;  Laterality: Right;    INJECTION OF ANESTHETIC AGENT AROUND MEDIAL BRANCH NERVES INNERVATING LUMBAR FACET JOINT N/A 04/11/2019    Procedure: Lumbo-sacral Block, DR5 and Lateral Branches of S1,S2, S3;  Surgeon: Michelle Pineda Jr., MD;  Location: [facility] PAIN MGT;  Service: Pain Management;  Laterality: N/A;  Pt takes  and states she holds ASA on her own whenever she has procedures.  Instructed to hold x 3 days prior to procedure.      INJECTION OF ANESTHETIC AGENT AROUND MEDIAL BRANCH NERVES INNERVATING LUMBAR FACET JOINT Bilateral 05/03/2023    Procedure: Block-nerve-medial branch-lumbar bilateral L3, L4, L5;  Surgeon: Maile Storm DO;  Location: [facility] PAIN [facility]GT;  Service: Pain Management;  Laterality: Bilateral;  asa    INJECTION OF ANESTHETIC AGENT INTO SACROILIAC JOINT Right 08/30/2018    Procedure: BLOCK, SACROILIAC JOINT-Right- ORAL SEDATION;  Surgeon: Michelle Pineda Jr., MD;  Location: [facility]IN [facility]GT;  Service: Pain Management;  Laterality: Right;    INJECTION OF ANESTHETIC AGENT INTO SACROILIAC JOINT Bilateral 09/27/2018    Procedure: BLOCK, SACROILIAC JOINT-BILATERAL;  Surgeon: Michelle Pineda Jr., MD;  Location: [facility] PAIN [facility]GT;  Service: Pain Management;  Laterality: Bilateral;  Please keep at 10:00 due to trasnsportation    INJECTION OF ANESTHETIC AGENT INTO SACROILIAC JOINT Bilateral 02/07/2019    Procedure: Bilateral Sacroiliac Joint Injection - Per Dr Pineda, not necessary to hold ASA.;  Surgeon: Michelle Pineda Jr., MD;  Location: [facility] PAIN MGT;  Service: Pain Management;  Laterality: Bilateral;    INTRAOCULAR PROSTHESES INSERTION Right 08/01/2019    Procedure: INSERTION, IOL PROSTHESIS;  Surgeon: Karen Song MD;  [facility];  Service: Ophthalmology;  Laterality: Right;     INTRAOCULAR PROSTHESES INSERTION Left 09/26/2019    Procedure: INSERTION, IOL PROSTHESIS;  Surgeon: Karen Song MD;  Location: [facility] OR [address] [place] [address];  Service: Ophthalmology;  Laterality: Left;    JOINT REPLACEMENT Right     knee    KNEE SURGERY      MASTECTOMY Right 05/09/2022    Procedure: MASTECTOMY-Right;  Surgeon: NEAL Dhillon MD;  Location: [facility] OR;  Service: General;  Laterality: Right;  2.5 HOURS    PHACOEMULSIFICATION OF CATARACT Right 08/01/2019    Procedure: PHACOEMULSIFICATION, CATARACT;  Surgeon: Karen Song MD;  Location: [facility] OR [address] [place] [address];  Service: Ophthalmology;  Laterality: Right;    PHACOEMULSIFICATION OF CATARACT Left 09/26/2019    Procedure: PHACOEMULSIFICATION, CATARACT;  Surgeon: Karen Song MD;  Location: [facility] OR [address] [place] [address];  Service: Ophthalmology;  Laterality: Left;    RADIOFREQUENCY ABLATION, NERVE, PERIPHERAL Right 04/24/2024    Procedure: RIGHT L5 Dorsal Ramus and RIGHT Lateral Branches of S1, S2, and S3;  Surgeon: Maile Storm DO;  Location: [facility] PAIN MANAGEMENT;  Service: Pain Management;  Laterality: Right;  30 mins    RADIOFREQUENCY THERMOCOAGULATION Right 05/07/2019    Procedure: RADIOFREQUENCY THERMAL COAGULATION RIGHT DORSAL RAMUS 5 AND LATERAL BRANCH OF S1, S2 AND S3;  Surgeon: Michelle Pineda Jr., MD;  Location: [facility];  Service: Pain Management;  Laterality: Right;    RADIOFREQUENCY THERMOCOAGULATION Left 05/14/2019    Procedure: RADIOFREQUENCY THERMAL COAGULATION LEFT DORSAL RAMUS 5 AND LATERAL BRANCH OF S1,S2 AND S3;  Surgeon: Michelle Pineda Jr., MD;  Location: [facility] PAIN MGT;  Service: Pain Management;  Laterality: Left;    RADIOFREQUENCY THERMOCOAGULATION Right 10/22/2019    Procedure: RADIOFREQUENCY THERMAL COAGULATION---DARSAL RAMUS 5 and LATERAL S1,S2,and S3 Right;  Surgeon: Michelle Pineda Jr., MD;  Location: [facility] PAIN T;  Service: Pain Management;  Laterality: Right;  patient to sign consent DOS    RADIOFREQUENCY THERMOCOAGULATION Left  10/29/2019    Procedure: RADIOFREQUENCY THERMAL COAGULATION - LEFT - DR5, S1,S2, AND S3;  Surgeon: Michelle Pineda Jr., MD;  Location: [facility];  Service: Pain Management;  Laterality: Left;    RADIOFREQUENCY THERMOCOAGULATION Left 05/26/2020    Procedure: RADIOFREQUENCY THERMAL COAGULATION--Left DR5+ lateral branches of S1, S2, S3;  Surgeon: Michelle Pineda Jr., MD;  Location: [facility];  Service: Pain Management;  Laterality: Left;    RADIOFREQUENCY THERMOCOAGULATION Right 06/02/2020    Procedure: RADIOFREQUENCY THERMAL COAGULATION--Right DR5+ lateral branches of S1, S2, S3;  Surgeon: Michelle Pineda Jr., MD;  Location: [facility];  Service: Pain Management;  Laterality: Right;    SENTINEL LYMPH NODE BIOPSY Right 05/09/2022    Procedure: BIOPSY, LYMPH NODE, SENTINEL-Right;  Surgeon: NEAL Dhillon MD;  Location: [facility];  Service: General;  Laterality: Right;    SINUS SURGERY      Surgery on right knee  07/09/1982    TONSILLECTOMY      tumor removed from back left side upper shoulder  03/17/2006        Physical Exam  There were no vitals filed for this visit. - Virtual Visit     Virtual Visit Physical Exam - 05/02/2024  GEN: No acute distress. Calm, comfortable  HENT: Normocephalic, atraumatic, moist mucous membranes  EYE: Anicteric sclera, non-injected  CV: Non-diaphoretic.  CHEST: Breathing comfortably. Chest expansion symmetric  EXT: No clubbing, cyanosis.   Psych: Mood and affect are appropriate  GAIT: Independent, normal ambulation        Ortho/SPM Exam  PHYSICAL EXAMINATION Prior:    GENERAL: Well appearing, in no acute distress, alert and oriented x3.  PSYCH:  Mood and affect appropriate.  SKIN: Skin color, texture, turgor normal, no rashes or lesions.  HEAD/FACE:  Normocephalic, atraumatic. Cranial nerves grossly intact.  NECK: Normal ROM. Supple.   CV: RRR with palpation of the radial artery.  PULM: No evidence of respiratory difficulty, symmetric chest rise.  GI:  Soft and  non-distended.  MSK: Straight leg raising is  negative to radicular pain. There is pain to palpation over the facet joints of the lumbar spine. There is pain with lumbar facet loading. There is pain over the SI joints. +HELIO. +[place]len. +Sacral Thurst. Normal range of motion without pain reproduction with lumbar flexion. Pain with extension.  Peripheral joint ROM is full and pain free without obvious instability or laxity in all four extremities. No deformities, edema, or skin discoloration.  No atrophy or tone abnormalities are noted.   NEURO: Bilateral upper and lower extremity coordination and strength is symmetric.  No loss of sensation is noted.  MENTAL STATUS: A x O x 3, good concentration, speech is fluent and goal directed  MOTOR: 5/5 in all muscle groups  GAIT: normal.  Ambulates unassisted.    Imaging  MRI LUMBAR SPINE WITHOUT CONTRAST     CLINICAL HISTORY:  low back pain; Sacrococcygeal disorders, not elsewhere classified     TECHNIQUE:  Multiplanar, multisequence MR images were acquired from the thoracolumbar junction to the sacrum without contrast.     COMPARISON:  03/06/2023     FINDINGS:  Alignment: Normal.     Vertebrae: Degenerative endplate changes throughout the lumbar spine.     Discs: Moderate to severe disc height loss throughout the lumbar spine.  No evidence for discitis.     Cord: Conus terminates at L2 and appears unremarkable.  Probable thin fatty filum terminale noted.     Degenerative findings:     T12-L1: Small central protrusion.  No spinal canal stenosis or neural foraminal narrowing.     L1-L2: Circumferential disc bulge with right paracentral/foraminal protrusion result in mild effacement of the thecal sac and moderate right neural foraminal narrowing.     L2-L3: Circumferential disc bulge.  No spinal canal stenosis or neural foraminal narrowing.     L3-L4: Circumferential disc bulge with left paracentral disc extrusion and moderate facet arthropathy result in effacement of the  left lateral recess and severe left neural foraminal narrowing.     L4-L5: Circumferential disc bulge and mild facet arthropathy result in mild effacement of the thecal sac and moderate left, mild right neural foraminal narrowing.     L5-S1: Circumferential disc bulge and moderate facet arthropathy result in mild effacement of the thecal sac and severe right, moderate left neural foraminal narrowing.     Paraspinal muscles & soft tissues: Mild paraspinal muscle atrophy.  Questionable gallbladder wall thickening and small stones.     Impression:     1. Multilevel degenerative changes of the lumbar spine detailed above.  Moderate to severe neural foraminal narrowing at L1-L2 and L3-S1.  2. Questionable gallbladder wall thickening and small stones.  Recommend further evaluation with abdominal ultrasound.        Electronically signed by: Akash Black MD  Date:                                            03/12/2023      Labs:  BMP  Lab Results   Component Value Date     (L) 06/03/2024    K 4.4 06/03/2024     06/03/2024    CO2 27 06/03/2024    BUN 3 (L) 06/03/2024    CREATININE 0.9 06/03/2024    CALCIUM 9.3 06/03/2024    ANIONGAP 8 06/03/2024    EGFRNORACEVR >60.0 06/03/2024     Lab Results   Component Value Date    ALT 13 06/03/2024    AST 19 06/03/2024    GGT 91 (H) 07/28/2014    ALKPHOS 67 06/03/2024    BILITOT 0.2 06/03/2024       Assessment:  Problem List Items Addressed This Visit    None            03/14/2023 -Bhavna Landry is a 68 y.o. 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, estrogen receptor positive (04/13/2022), Cataract, Depression, Dry eyes, Dry mouth, Duane's syndrome of right eye, Early dry stage nonexudative age-related macular degeneration of both eyes (09/20/2022), Fibromyalgia (04/17/2014), Fibromyalgia, Fractured hip, GERD (gastroesophageal reflux  disease), History of psychiatric hospitalization, Hyperlipidemia (02/02/1992), Hypertension, Hypocalcemia, Hyponatremia, Kidney stone, Migraine headache, Osteoporosis, Pressure ulcer of unspecified site, unspecified stage, Psoriatic arthritis (02/02/1992), Recurrent upper respiratory infection (URI), Right knee pain, RLS (restless legs syndrome), Schizophrenia (02/02/1992), Sciatica, Squamous cell carcinoma of skin, and Urinary tract infection.  By history and examination this patient has chronic low back pain without radiculopathy.  The underlying cause cause is facet arthritis and deconditioning.  Pathology is confirmed by imaging.  We discussed the underlying diagnoses and multiple treatment options including non-opioid medications, interventional procedures, physical therapy, and home exercise.  The risks and benefits of each treatment option were discussed and all questions were answered.      5/22/2023- 68 y/o female with a Hx of chronic low back  and bilateral leg pain she is s/p a diagnosis lumbar MBB targeting L4/5 and L5/S1 reporting 0% relief of her symptoms. Her Lumbar MRI multilevel disc bulges starting   at L3 through S1.  She has severe right, moderate left neural foraminal narrowing at L5-S1 thta may be causing her Low back and leg pain.  Her pain was refractory to diagnositic low lumbar MBB so i will attempt a L4-5 Lumbar SOPHY.     04/02/2024 Bhavna Landry presents for follow up of her low back/SI joint pain.  Patient has previously undergone bilateral L5 Dorsal Ramus and Lateral Branches of S1, S2, and S3 RFA with significant and lasting relief.  Prior RFA lasted about 2 years.  She would like to repeat the procedure as the pain has now returned.    05/02/2024 - Patient presents virtually today for follow up of her low back pain.  Today her biggest complaint is radicular symptoms primarily on the right.  Patient has a history of lumbar radiculopathy and previously underwent an L5/S1 epidural  steroid injection in 08/2023 with excellent relief.  Her pain has returned and she would like to repeat the epidural steroid injection.  She would like to hold off on the left-sided sacral RFA for now until we can address her radicular symptoms as they are more severe.  Discussed with the patient that she may benefit from a surgical consult if epidural does not provide persistent relief.    6/21/2024- 69 y/o female presents for continued pain in the right low back right buttocks and right leg that radiates into her right foot. Her recent L5-S1 IESI provided 30% relief of her symptoms.  Her MRI significant for disc bulges throughout the lower lumbar spine L3 through S1 she has severe right moderate left neural foraminal narrowing at L5-S1 that may be causing right low back and right leg pain.  Today we discussed focusing her pain interventions on the right side in effort to reduce some of the right-sided symptoms as she does not complain about pain in the left lumbar or left leg.    Plan & Recommendations  Procedures:  s/f a Right L3/4 and  L4/5 TFESI   Plan to reschedule left L5 Dorsal Ramus and Lateral Branches of S1, S2, and S3 RFA (right-sided has already been completed.)  Medications: Start Lyrica 50 mg QHS no longer taking Gabapentin due to LE swelling.   Imaging: reviewed and discussed in detail  PT/OT/HEP: I encouraged the patient to maintain a home exercise regimen that includes daily, moderate cardiovascular exercise lasting at least 30 minutes.  This may include yoga, gela chi, walking, swimming, aqua aerobics, or other exercises that maintain a heart rate of 50-70% of the calculated maximum heart rate.  I also encouraged light, daily stretching focused on the target area.  Follow Up: RTC 2 weeks post procedure    Daniel Brar NP-C  Interventional Pain Management      Disclaimer: This note was partly generated using dictation software which may occasionally result in transcription errors.

## 2024-06-21 ENCOUNTER — LAB VISIT (OUTPATIENT)
Dept: LAB | Facility: HOSPITAL | Age: 69
End: 2024-06-21
Attending: STUDENT IN AN ORGANIZED HEALTH CARE EDUCATION/TRAINING PROGRAM
Payer: MEDICARE

## 2024-06-21 ENCOUNTER — PATIENT MESSAGE (OUTPATIENT)
Dept: PAIN MEDICINE | Facility: CLINIC | Age: 69
End: 2024-06-21

## 2024-06-21 ENCOUNTER — TELEPHONE (OUTPATIENT)
Dept: PAIN MEDICINE | Facility: CLINIC | Age: 69
End: 2024-06-21
Payer: MEDICARE

## 2024-06-21 ENCOUNTER — OFFICE VISIT (OUTPATIENT)
Dept: PAIN MEDICINE | Facility: CLINIC | Age: 69
End: 2024-06-21
Payer: MEDICARE

## 2024-06-21 VITALS
HEART RATE: 75 BPM | WEIGHT: 99.19 LBS | BODY MASS INDEX: 19.47 KG/M2 | HEIGHT: 60 IN | SYSTOLIC BLOOD PRESSURE: 153 MMHG | DIASTOLIC BLOOD PRESSURE: 76 MMHG

## 2024-06-21 DIAGNOSIS — R76.8 WEAK ANTIBODY RESPONSE TO PNEUMOCOCCAL VACCINE: ICD-10-CM

## 2024-06-21 DIAGNOSIS — D80.1 HYPOGAMMAGLOBULINEMIA: ICD-10-CM

## 2024-06-21 DIAGNOSIS — M54.17 LUMBOSACRAL RADICULOPATHY: Primary | ICD-10-CM

## 2024-06-21 DIAGNOSIS — Z86.19 FREQUENT INFECTIONS: ICD-10-CM

## 2024-06-21 DIAGNOSIS — M54.40 CHRONIC MIDLINE LOW BACK PAIN WITH SCIATICA, SCIATICA LATERALITY UNSPECIFIED: ICD-10-CM

## 2024-06-21 DIAGNOSIS — G89.29 CHRONIC MIDLINE LOW BACK PAIN WITH SCIATICA, SCIATICA LATERALITY UNSPECIFIED: ICD-10-CM

## 2024-06-21 DIAGNOSIS — M51.36 DDD (DEGENERATIVE DISC DISEASE), LUMBAR: ICD-10-CM

## 2024-06-21 DIAGNOSIS — M54.16 LUMBAR RADICULOPATHY: Primary | ICD-10-CM

## 2024-06-21 LAB
IGA SERPL-MCNC: 125 MG/DL (ref 40–350)
IGG SERPL-MCNC: 383 MG/DL (ref 650–1600)
IGM SERPL-MCNC: 18 MG/DL (ref 50–300)

## 2024-06-21 PROCEDURE — 99999 PR PBB SHADOW E&M-EST. PATIENT-LVL V: CPT | Mod: PBBFAC,,, | Performed by: NURSE PRACTITIONER

## 2024-06-21 PROCEDURE — 86317 IMMUNOASSAY INFECTIOUS AGENT: CPT | Mod: 59 | Performed by: STUDENT IN AN ORGANIZED HEALTH CARE EDUCATION/TRAINING PROGRAM

## 2024-06-21 PROCEDURE — 82784 ASSAY IGA/IGD/IGG/IGM EACH: CPT | Mod: 59 | Performed by: STUDENT IN AN ORGANIZED HEALTH CARE EDUCATION/TRAINING PROGRAM

## 2024-06-21 PROCEDURE — 36415 COLL VENOUS BLD VENIPUNCTURE: CPT | Mod: PO | Performed by: STUDENT IN AN ORGANIZED HEALTH CARE EDUCATION/TRAINING PROGRAM

## 2024-06-21 NOTE — TELEPHONE ENCOUNTER
----- Message from MILLIE Watts sent at 2024 11:19 AM CDT -----  Regarding: Order for PHUONG HAZEL    Patient Name: PHUONG HAZEL([id number])  Sex: Female  : 1955      PCP: MILLIE TELLO    Center: [facility] CENTRAL BILLING OFFICE     Types of orders made on 2024: Outpatient Referral, Procedure Request    Order Date:2024  Ordering User:MAHAD DESIR [[id number]]  Encounter Provider:Mahad Desir FNP [[id number]]  Aut  horizing Provider: Mahad Desir FNP [[id number]]  Supervising Provider:NANI SEVERINO [[id number]]  Type of Supervision:Collaborating Physician  Department:[facility] PAIN MANAGEMENT[[id number]]    Common Order Information  Procedure -> Transforaminal Injection (Specify level and laterality) Cmt: Right             L3/4  and L4/5    Pre-op Diagnosis -> Lumbar radiculopathy       -> DDD (degenerative disc disease), lumbar     Order Specific   Information  Order: Procedure Request Order for Pain Management [Custom: [id number]]  Order #:          [id number]: 1 FUTURE    Priority: Routine  Class: Clinic Performed    Future Order Information      Expires on:2025            Expected by:2024                   Associated Diagnoses      M54.40, G89.29 Chronic midline low back pain with sciatica, sciatica       laterality unspecifie  d      M54.17 Lumbosacral radiculopathy      M51.36 DDD (degenerative disc disease), lumbar      Physician -> Emeterio         Is patient on anti-coagulants? -> No         Facility Name: -> [facility]         Follow-up: -> 2 weeks           Priority: Routine  Class: Clinic Performed    Future Order Information      Expires on:2025            Expected by:2024                   Associated Diagnoses      M54.  40, G89.29 Chronic midline low back pain with sciatica, sciatica       laterality unspecified      M54.17 Lumbosacral radiculopathy      M51.36 DDD (degenerative disc disease), lumbar      Procedure -> Transforaminal Injection (Specify level and  laterality) Cmt:                 Right L3/4  and L4/5        Physician -> Sharonater         Is patient on anti-coagulants? -> No         Pre-op Diagnosis -> Lumbar radiculopathy           -> DDD (degenerativ  e disc disease), lumbar         Facility Name: -> [facility]         Follow-up: -> 2 weeks

## 2024-06-24 DIAGNOSIS — K21.9 GASTROESOPHAGEAL REFLUX DISEASE, UNSPECIFIED WHETHER ESOPHAGITIS PRESENT: ICD-10-CM

## 2024-06-24 DIAGNOSIS — K31.89 EROSIVE GASTROPATHY: ICD-10-CM

## 2024-06-24 RX ORDER — OMEPRAZOLE 20 MG/1
20 CAPSULE, DELAYED RELEASE ORAL
Qty: 30 CAPSULE | Refills: 11 | Status: SHIPPED | OUTPATIENT
Start: 2024-06-24

## 2024-06-24 NOTE — TELEPHONE ENCOUNTER
----- Message from MILLIE Watts sent at 6/24/2024  3:03 PM CDT -----  Regarding: Medication  Please pend Lyrica 50 mg q.h.s. 30 days at this time

## 2024-06-25 RX ORDER — PREGABALIN 50 MG/1
50 CAPSULE ORAL NIGHTLY
Qty: 30 CAPSULE | Refills: 0 | Status: SHIPPED | OUTPATIENT
Start: 2024-06-25 | End: 2024-07-25

## 2024-06-26 ENCOUNTER — CLINICAL SUPPORT (OUTPATIENT)
Dept: REHABILITATION | Facility: HOSPITAL | Age: 69
End: 2024-06-26
Payer: MEDICARE

## 2024-06-26 DIAGNOSIS — M54.10 RADICULAR LEG PAIN: ICD-10-CM

## 2024-06-26 DIAGNOSIS — T84.84XA PAIN IN KNEE REGION AFTER TOTAL KNEE REPLACEMENT, INITIAL ENCOUNTER: ICD-10-CM

## 2024-06-26 DIAGNOSIS — G89.29 CHRONIC LEFT-SIDED LOW BACK PAIN WITHOUT SCIATICA: ICD-10-CM

## 2024-06-26 DIAGNOSIS — Z96.659 PAIN IN KNEE REGION AFTER TOTAL KNEE REPLACEMENT, INITIAL ENCOUNTER: ICD-10-CM

## 2024-06-26 DIAGNOSIS — M54.50 CHRONIC LEFT-SIDED LOW BACK PAIN WITHOUT SCIATICA: ICD-10-CM

## 2024-06-26 PROCEDURE — 97162 PT EVAL MOD COMPLEX 30 MIN: CPT | Mod: PN

## 2024-06-26 PROCEDURE — 97530 THERAPEUTIC ACTIVITIES: CPT | Mod: PN

## 2024-06-26 NOTE — PLAN OF CARE
[facility]   Physical Therapy Initial Evaluation      Name: Bhavna Landry  [facility] Number: [id number]    Therapy Diagnosis:   Encounter Diagnoses   Name Primary?    Pain in knee region after total knee replacement, initial encounter     Radicular leg pain     Chronic left-sided low back pain without sciatica         Physician: Aleena Perdue PA-C    Physician Orders: PT Eval and Treat   Medical Diagnosis from Referral:   T84.84XA,Z96.659 (ICD-10-CM) - Pain in knee region after total knee replacement, initial encounter   M54.10 (ICD-10-CM) - Radicular leg pain   M54.50,G89.29 (ICD-10-CM) - Chronic left-sided low back pain without sciatica     Evaluation Date: 6/26/2024  Authorization Period Expiration: 12/31/2024  Plan of Care Expiration: 8/7/2024  Progress Note Due: 7/18/2024  Date of Surgery: none   Visit # / Visits authorized: 1/ 20   FOTO: 1/5    Precautions: Standard and hx of cancer    Time In: 11:10 am  Time Out: 12:00 pm   Total Billable Time: 50 minutes    Subjective     Date of onset: chronic history with acute flare up.    History of current condition - Bhavna reports chronic history of low back pain. In the past 3-4 months significant increase in low back pain. Localizes pain to right buttocks that radiates down the lateral aspect of her thigh and wraps to the front of her shin and to the top of her foot. Describes pain as a constant ache in her right lower extremity. She has had a nerve ablation with no relief and then received SOPHY with some mid-thoracic pain relief, but continued low back pain. Another SOPHY scheduled in July for low back pain. Increased difficulty with standing and walking, especially long durations. Gets relief with slouching forwards. Patient denies incontinence, weakness or changes in gait, saddle/perineal paresthesia, pain unchanged with rest, or unexplainable weight loss.      Falls: none     Imaging: See EMR    Prior Therapy: yes, SIJ, low back, and neck  pain   Social History: Lives with her daughter and son-in-law with three grandchildren    Occupation: retired from desk job   Prior Level of Function: independent   Current Level of Function: independent with pain and frequent rest breaks     Pain:  Current 8/10, worst 10/10, best 6/10   Location: midline low back and right lower extremity pain   Description: Aching and Dull  Aggravating Factors: Standing, Walking, and Getting out of bed/chair  Easing Factors:  slouching, heat    Patients goals: improve pain levels and quality of life; be able to sleep better.     Medical History:   Past Medical History:   Diagnosis Date    Allergy     Amblyopia     Anemia     Anticoagulant long-term use     Arthritis 1992    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, estrogen receptor positive 2022    Cataract     Depression     Dry eyes     Dry mouth     Duane's syndrome of right eye     Early dry stage nonexudative age-related macular degeneration of both eyes 2022    Fibromyalgia 2014    Fibromyalgia     Fractured hip     RIGHT HIP    GERD (gastroesophageal reflux disease)     History of psychiatric hospitalization     Hyperlipidemia 1992    Hypertension     Hypocalcemia     Hyponatremia     Kidney stone     Migraine headache     Osteoporosis     Pressure ulcer of unspecified site, unspecified stage     Psoriatic arthritis 1992    Recurrent upper respiratory infection (URI)     Right knee pain     post knee replacement surgery (possible rejectiion of metal)    RLS (restless legs syndrome)     Schizophrenia 1992    stable on meds    Sciatica     Squamous cell carcinoma of skin     Urinary tract infection        Surgical History:   Bhavna Landry  has a past surgical history that includes Surgery on right knee (1982); cyst removed from right sinus (1982); tumor removed from back left side upper shoulder (2006); Hysterectomy; Knee surgery;   section; Joint replacement (Right); Colonoscopy (N/A, 05/30/2016); Injection of anesthetic agent into sacroiliac joint (Right, 08/30/2018); Injection of anesthetic agent into sacroiliac joint (Bilateral, 09/27/2018); Injection of anesthetic agent into sacroiliac joint (Bilateral, 02/07/2019); brow ptosis repair (Right, 03/04/2019); Injection of anesthetic agent around medial branch nerves innervating lumbar facet joint (N/A, 04/11/2019); Radiofrequency thermocoagulation (Right, 05/07/2019); Radiofrequency thermocoagulation (Left, 05/14/2019); Phacoemulsification of cataract (Right, 08/01/2019); Intraocular prosthesis insertion (Right, 08/01/2019); Cataract extraction; Phacoemulsification of cataract (Left, 09/26/2019); Intraocular prosthesis insertion (Left, 09/26/2019); Radiofrequency thermocoagulation (Right, 10/22/2019); Radiofrequency thermocoagulation (Left, 10/29/2019); Radiofrequency thermocoagulation (Left, 05/26/2020); Radiofrequency thermocoagulation (Right, 06/02/2020); Mastectomy (Right, 05/09/2022); [place] lymph node biopsy (Right, 05/09/2022); Injection for sentinel node identification (Right, 05/09/2022); Colonoscopy (N/A, 12/09/2022); Esophagogastroduodenoscopy (N/A, 02/07/2023); Injection of anesthetic agent around medial branch nerves innervating lumbar facet joint (Bilateral, 05/03/2023); Epidural steroid injection into lumbar spine (N/A, 08/02/2023); Epidural steroid injection into cervical spine (N/A, 12/08/2023); Tonsillectomy; Sinus surgery; radiofrequency ablation, nerve, peripheral (Right, 04/24/2024); Foot fracture surgery; and Epidural steroid injection into lumbar spine (N/A, 5/22/2024).    Medications:   Bhavna has a current medication list which includes the following prescription(s): albuterol, albuterol, amlodipine, ascorbic acid (vitamin c), aspirin, atorvastatin, baclofen, benztropine, symbicort, ciclopirox, clonidine, exemestane, fluticasone, fluticasone propionate, folic acid,  gabapentin, hydrocodone-acetaminophen, isosorbide mononitrate, lidocaine, losartan, methotrexate, fish oil-omega-3 fatty acids, omeprazole, omeprazole, pregabalin, risperidone, risperidone, venlafaxine, vitamin d, vitamin e, and zinc gluconate, and the following Facility-Administered Medications: tropicamide 1%.    Allergies:   Review of patient's allergies indicates:   Allergen Reactions    Etanercept Other (See Comments)     Other reaction(s): recurrent infections    Chloramphenicol sod succinate Hives    Codeine Other (See Comments)     Other reaction(s): Stomach upset. Pt states OK with Percocet    Nickel sutures [surgical stainless steel] Dermatitis     Allergic contact dermatitis    Adhesive Rash        Objective        Posture: forward trunk lean leaning to the left    Palpation: TTP bilateral thoracolumbar paraspinals; right SIJ; right glute    Sensation: intact light touch sensation to BLE throughout L2-S2 dermatomal pattern    Gross Deep Tendon Reflexes:    Right  Left   Patellar (L3-L4): 0 2+   Achilles (S1):  0 0   Ankle clonus: (-)      Functional Movements:  Gait Analysis: antalgic with limp; hand placed on right hip; decreased step length on left and decreased terminal hip extension on right   Bed mobility: painful and guarded     AROM: Seated   Degrees Pain/Dysfunction   Flexion Finger tips to toes  ! in right buttocks   Gregory's Sign: +   Extension 5 ! in right buttocks    Right Side Bending  15% NP   Left Side Bending  15% NP   Right Rotation 20% NP   Left Rotation 20%% NP     Strength:  RLE  LLE    Hip flexion: 4/5 Hip flexion: 4/5   Hip Abduction: 4-/5 Hip abduction: 4-/5   Hip adduction: 4-/5 Hip adduction: 4-/5   Hip ER 4-/5 Hip ER 3/5   Hip IR 3+/5 Hip IR 4-/5   Knee flexion: 4/5 Knee flexion: 4/5   Knee extension: 4+/5 Knee extension: 4+/5   Ankle Dorsiflexion: 4-/5 Ankle Dorsiflexion: 4/5   Ankle Plantarflexion: 5/5 Ankle Plantarflexion: 5/5     Special tests:   SLR: (-) bilateral  HELIO: (+)  "right   FADIR: (+) right   Scour's: (+) relief   Flexion Preference: yes  Extension Preference: no  Lumbar distraction: (+) for relief     Joint mobility:   Lumbar: Painful CPA's L1-L5, S1      Intake Outcome Measure for FOTO lumbar Survey    Therapist reviewed FOTO scores for Bhavna Landry on 6/26/2024.   FOTO report - see Media section or FOTO account episode details.    Intake Score: 64%         Treatment     Total Treatment time (time-based codes) separate from Evaluation: 15 minutes     Bhavna received the treatments listed below:      therapeutic activities to improve functional performance for 5  minutes, including:  stool roll outs: 10x  Seated hip abduction isometric: 10x5"    manual therapy techniques: Joint mobilizations and Soft tissue Mobilization were applied for 10 minutes, including:  Lumbar traction    Patient Education and Home Exercises     Education provided:   - Findings; prognosis and plan of care  - Home exercise program  - Modality options  - Therapist contact information      Written Home Exercises Provided: yes.  Exercises were reviewed and Bhavna was able to demonstrate them prior to the end of the session.  Bhavna demonstrated good understanding of the education provided. See EMR under Patient Instructions for exercises provided during therapy sessions.    Assessment     Bhavna is a 68 y.o. 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 Patient presents with signs and symptoms of chronic low back pain. Symptoms correlate with stenosis pattern of pain with right lower extremity radicular pain. Unable to reproduce with straight leg raise. Relief with forwards flexion. Difficulty with long duration standing and walking affecting ability to tolerate " functional activities.      Patient prognosis is Fair.   Patient will benefit from skilled outpatient Physical Therapy to address the deficits stated above and in the chart below, provide patient /family education, and to maximize patientt's level of independence.     Plan of care discussed with patient: yes  Patient's spiritual, cultural and educational needs considered and patient is agreeable to the plan of care and goals as stated below:     Anticipated Barriers for therapy: chronic re-occurrences; high intensity pain levels     Medical Necessity is demonstrated by the following  History  Co-morbidities and personal factors that may impact the plan of care [] LOW: no personal factors / co-morbidities  [] MODERATE: 1-2 personal factors / co-morbidities  [x] HIGH: 3+ personal factors / co-morbidities    Moderate / High Support Documentation:   Co-morbidities affecting plan of care: schizophrenia, fibromyalgia, HTN, HLD, anemia, depression, osteoporosis,     Personal Factors:   age  coping style  social background  lifestyle     Examination  Body Structures and Functions, activity limitations and participation restrictions that may impact the plan of care [] LOW: addressing 1-2 elements  [x] MODERATE: 3+ elements  [x] HIGH: 4+ elements (please support below)    Moderate / High Support Documentation: Based on PMHX, co morbidities , data from assessments and functional level of assistance required with task and clinical presentation directly impacting function.         Clinical Presentation [] LOW: stable  [x] MODERATE: Evolving  [] HIGH: Unstable     Decision Making/ Complexity Score: moderate       Goals:  Short Term Goals (3 Weeks):  1. Patient will be compliant with home exercise program to supplement therapy in promoting functional mobility.  2. Patient will perform transverse abdominus activation with good control to demonstrate improved core strength.  3. Patient will report no pain during bed mobility  transitions to promote functional mobility.  4. Patient will improve impaired lower extremity myotomes/manual muscle tests  to >/=4/5 to improve strength for functional tasks.    Long Term Goals (6 Weeks):   1. Patient will improve FOTO score to </= 47% limited to decrease perceived limitation with maintaining/changing body position.   2. Patient will be able to sleep for >/= 3 nights without waking from pain to improve quality of life.   3. Patient will improve impaired lower extremity myotomes/manual muscle tests to >/=4+/5 to improve strength for functional tasks.  4. Patient will be able to complete 6 MWT to improve aerobic tolerance and functional lower extremity strength and endurance.     Plan     Plan of care Certification: 6/26/2024 to 8/7/2024.    Outpatient Physical Therapy 2 times weekly for 6 weeks to include the following interventions: Gait Training, Manual Therapy, Moist Heat/ Ice, Neuromuscular Re-ed, Patient Education, Self Care, Therapeutic Activities, and Therapeutic Exercise.     Stephanie Castellanos PT        Physician's Signature: _________________________________________ Date: ________________

## 2024-06-27 PROBLEM — R29.898 DECREASED ROM OF NECK: Status: RESOLVED | Noted: 2020-09-15 | Resolved: 2024-06-27

## 2024-06-27 PROBLEM — R53.1 DECREASED STRENGTH: Status: RESOLVED | Noted: 2023-09-26 | Resolved: 2024-06-27

## 2024-06-28 LAB
IMMUNOLOGIST REVIEW: NORMAL
S PN DA SERO 19F IGG SER-MCNC: 0.9 MCG/ML
S PNEUM DA 1 IGG SER-MCNC: 2.5 MCG/ML
S PNEUM DA 10A IGG SER-MCNC: 6.2 MCG/ML
S PNEUM DA 11A IGG SER-MCNC: 0.2 MCG/ML
S PNEUM DA 12F IGG SER-MCNC: <0.1 MCG/ML
S PNEUM DA 14 IGG SER-MCNC: 0.4 MCG/ML
S PNEUM DA 15B IGG SER-MCNC: 0.2 MCG/ML
S PNEUM DA 17F IGG SER-MCNC: 0.3 MCG/ML
S PNEUM DA 18C IGG SER-MCNC: 0.9 MCG/ML
S PNEUM DA 19A IGG SER-MCNC: 3.4 MCG/ML
S PNEUM DA 2 IGG SER-MCNC: 0.3 MCG/ML
S PNEUM DA 20A IGG SER-MCNC: 0.7 MCG/ML
S PNEUM DA 22F IGG SER-MCNC: 0.1 MCG/ML
S PNEUM DA 23F IGG SER-MCNC: 0.2 MCG/ML
S PNEUM DA 3 IGG SER-MCNC: 0.1 MCG/ML
S PNEUM DA 33F IGG SER-MCNC: 0.4 MCG/ML
S PNEUM DA 4 IGG SER-MCNC: 0.1 MCG/ML
S PNEUM DA 5 IGG SER-MCNC: 3.1 MCG/ML
S PNEUM DA 6B IGG SER-MCNC: 0.2 MCG/ML
S PNEUM DA 7F IGG SER-MCNC: 1.6 MCG/ML
S PNEUM DA 8 IGG SER-MCNC: 0.2 MCG/ML
S PNEUM DA 9N IGG SER-MCNC: 0.1 MCG/ML
S PNEUM DA 9V IGG SER-MCNC: 0.1 MCG/ML

## 2024-07-01 DIAGNOSIS — G89.29 CHRONIC LOW BACK PAIN WITHOUT SCIATICA, UNSPECIFIED BACK PAIN LATERALITY: ICD-10-CM

## 2024-07-01 DIAGNOSIS — M54.50 CHRONIC LOW BACK PAIN WITHOUT SCIATICA, UNSPECIFIED BACK PAIN LATERALITY: ICD-10-CM

## 2024-07-01 RX ORDER — HYDROCODONE BITARTRATE AND ACETAMINOPHEN 5; 325 MG/1; MG/1
1 TABLET ORAL EVERY 12 HOURS PRN
Qty: 60 TABLET | Refills: 0 | Status: SHIPPED | OUTPATIENT
Start: 2024-07-01 | End: 2024-07-02 | Stop reason: SDUPTHER

## 2024-07-01 NOTE — TELEPHONE ENCOUNTER
Care Due:                  Date            Visit Type   Department     Provider  --------------------------------------------------------------------------------                                EP -                              PRIMARY      [facility]  Last Visit: 03-      CARE ([facility])   MEDICINE       MILLIE TELLO  Next Visit: None Scheduled  None         None Found                                                            Last  Test          Frequency    Reason                     Performed    Due Date  --------------------------------------------------------------------------------    Lipid Panel.  12 months..  atorvastatin.............  05- 05-    [facility] Embedded Care Due Messages. Reference number: [id number].   7/01/2024 1:40:01 PM CDT

## 2024-07-01 NOTE — TELEPHONE ENCOUNTER
----- Message from Karin João sent at 7/1/2024 10:48 AM CDT -----  Contact: Bhavna Friedman   Requesting an RX refill or new RX.    Is this a refill or new RX: refill     RX name and strength (copy/paste from chart):  Hyrdrocodone  3 x a day     Is this a 30 day or 90 day RX:     Pharmacy name and phone # (copy/paste from chart):  [facility]     The doctors have asked that we provide their patients with the following 2 reminders -- prescription refills can take up to 72 hours, and a friendly reminder that in the future you can use your [facility] account to request refills:

## 2024-07-02 ENCOUNTER — TELEPHONE (OUTPATIENT)
Dept: CARDIOLOGY | Facility: CLINIC | Age: 69
End: 2024-07-02
Payer: MEDICARE

## 2024-07-02 ENCOUNTER — PATIENT MESSAGE (OUTPATIENT)
Dept: CARDIOLOGY | Facility: CLINIC | Age: 69
End: 2024-07-02
Payer: MEDICARE

## 2024-07-02 ENCOUNTER — PATIENT MESSAGE (OUTPATIENT)
Dept: INTERNAL MEDICINE | Facility: CLINIC | Age: 69
End: 2024-07-02
Payer: MEDICARE

## 2024-07-02 DIAGNOSIS — M54.50 CHRONIC LOW BACK PAIN WITHOUT SCIATICA, UNSPECIFIED BACK PAIN LATERALITY: ICD-10-CM

## 2024-07-02 DIAGNOSIS — G89.29 CHRONIC LOW BACK PAIN WITHOUT SCIATICA, UNSPECIFIED BACK PAIN LATERALITY: ICD-10-CM

## 2024-07-02 NOTE — TELEPHONE ENCOUNTER
No care due was identified.  [facility] Embedded Care Due Messages. Reference number: [id number].   7/02/2024 5:34:50 PM CDT

## 2024-07-02 NOTE — TELEPHONE ENCOUNTER
Patient was scheduled for 08/20/2024 at 2:20, patient was moved to 09/03/2024 @11:20 due to the provider being out of the office/ letter/portal message/lvm

## 2024-07-03 ENCOUNTER — TELEPHONE (OUTPATIENT)
Dept: CARDIOLOGY | Facility: CLINIC | Age: 69
End: 2024-07-03
Payer: MEDICARE

## 2024-07-03 ENCOUNTER — TELEPHONE (OUTPATIENT)
Dept: HEMATOLOGY/ONCOLOGY | Facility: CLINIC | Age: 69
End: 2024-07-03
Payer: MEDICARE

## 2024-07-03 DIAGNOSIS — E78.5 HYPERLIPIDEMIA, UNSPECIFIED HYPERLIPIDEMIA TYPE: Primary | ICD-10-CM

## 2024-07-03 DIAGNOSIS — Z17.0 CARCINOMA OF UPPER-OUTER QUADRANT OF RIGHT BREAST IN FEMALE, ESTROGEN RECEPTOR POSITIVE: Primary | ICD-10-CM

## 2024-07-03 DIAGNOSIS — C50.411 CARCINOMA OF UPPER-OUTER QUADRANT OF RIGHT BREAST IN FEMALE, ESTROGEN RECEPTOR POSITIVE: Primary | ICD-10-CM

## 2024-07-03 DIAGNOSIS — I48.0 PAF (PAROXYSMAL ATRIAL FIBRILLATION): ICD-10-CM

## 2024-07-03 DIAGNOSIS — I48.0 PAF (PAROXYSMAL ATRIAL FIBRILLATION): Primary | ICD-10-CM

## 2024-07-03 RX ORDER — HYDROCODONE BITARTRATE AND ACETAMINOPHEN 5; 325 MG/1; MG/1
1 TABLET ORAL EVERY 12 HOURS PRN
Qty: 60 TABLET | Refills: 0 | Status: SHIPPED | OUTPATIENT
Start: 2024-07-03

## 2024-07-03 NOTE — TELEPHONE ENCOUNTER
----- Message from Christy Elias sent at 7/3/2024  1:01 PM CDT -----  Regarding: order  Contact: @  [phone]  Pt called in regards to seeing if the doctor can order  6 bra and insert to [facility] ..Please call and adv @  [phone]

## 2024-07-03 NOTE — TELEPHONE ENCOUNTER
I called patient back and left her V/Message regarding Isosorbide.      Nw                                ----- Message from Annabelle Pan sent at 7/3/2024 11:10 AM CDT -----  Type:   Call    Who Called:pt  Does the patient know what this is regarding?:medication  Would the patient rather a call back or a response via [facility]? call  Best Call Back Number: [phone]  Additional Information: Pt stated this is the last month Dr. Vargas will fill isosorbide mononitrate (IMDUR) 30 MG 24 hr tablet since they moved her appt date to 9/3/24.  Pt stated she will need another refill at the end of July for August.

## 2024-07-03 NOTE — TELEPHONE ENCOUNTER
I reached back out to , and left her  V/Message regarding Studies prior to seeing .      Nw                        ----- Message from Annabelle Pan sent at 7/3/2024 11:15 AM CDT -----  Type:  Sooner Appointment Request    Caller is requesting a sooner appointment.  Caller declined first available appointment listed below.  Caller will not accept being placed on the waitlist and is requesting a message be sent to doctor.  Name of Caller:call  When is the first available appointment?09/17/24  Symptoms:Annual  Would the patient rather a call back or a response via [facility]? call  Best Call Back Number: [phone]  Additional Information: Desired appt date 9/3/24

## 2024-07-03 NOTE — TELEPHONE ENCOUNTER
I  reached out  and spoke to patient ,    And we schedule her an appointment to See sef with studies prior.    Appointment also mailed to patient home.    Patient confirmed appointment too.      Nelsy Smith (rita).                              ----- Message from Rena Gee sent at 7/3/2024  2:49 PM CDT -----  Type:  Patient Returning Call    Who Called:pt  Who Left Message for Patient:Nelsy Smith,  Does the patient know what this is regarding?:yes  Would the patient rather a call back or a response via [facility]? call  Best Call Back Number: [phone]  Additional Information:

## 2024-07-05 ENCOUNTER — HOSPITAL ENCOUNTER (OUTPATIENT)
Dept: RADIOLOGY | Facility: HOSPITAL | Age: 69
Discharge: HOME | End: 2024-07-05
Attending: REGISTERED NURSE
Payer: MEDICARE

## 2024-07-05 ENCOUNTER — OFFICE VISIT (OUTPATIENT)
Dept: ORTHOPEDICS | Facility: CLINIC | Age: 69
End: 2024-07-05
Payer: MEDICARE

## 2024-07-05 DIAGNOSIS — M54.9 DORSALGIA, UNSPECIFIED: ICD-10-CM

## 2024-07-05 DIAGNOSIS — M41.115 JUVENILE IDIOPATHIC SCOLIOSIS OF THORACOLUMBAR REGION: Primary | ICD-10-CM

## 2024-07-05 DIAGNOSIS — M54.16 LUMBAR RADICULOPATHY: ICD-10-CM

## 2024-07-05 DIAGNOSIS — M51.36 DDD (DEGENERATIVE DISC DISEASE), LUMBAR: ICD-10-CM

## 2024-07-05 PROCEDURE — 72110 X-RAY EXAM L-2 SPINE 4/>VWS: CPT | Mod: 26,,, | Performed by: RADIOLOGY

## 2024-07-05 PROCEDURE — 99999 PR PBB SHADOW E&M-EST. PATIENT-LVL II: CPT | Mod: PBBFAC,,, | Performed by: REGISTERED NURSE

## 2024-07-05 PROCEDURE — 72110 X-RAY EXAM L-2 SPINE 4/>VWS: CPT | Mod: TC

## 2024-07-05 RX ORDER — PREGABALIN 50 MG/1
50 CAPSULE ORAL 3 TIMES DAILY
Qty: 90 CAPSULE | Refills: 5 | Status: SHIPPED | OUTPATIENT
Start: 2024-07-05 | End: 2025-01-03

## 2024-07-05 RX ORDER — MELOXICAM 7.5 MG/1
7.5 TABLET ORAL DAILY
Qty: 14 TABLET | Refills: 0 | Status: SHIPPED | OUTPATIENT
Start: 2024-07-05

## 2024-07-07 ENCOUNTER — PATIENT MESSAGE (OUTPATIENT)
Dept: ALLERGY | Facility: CLINIC | Age: 69
End: 2024-07-07
Payer: MEDICARE

## 2024-07-10 ENCOUNTER — CLINICAL SUPPORT (OUTPATIENT)
Dept: REHABILITATION | Facility: HOSPITAL | Age: 69
End: 2024-07-10
Payer: MEDICARE

## 2024-07-10 ENCOUNTER — TELEPHONE (OUTPATIENT)
Dept: PAIN MEDICINE | Facility: CLINIC | Age: 69
End: 2024-07-10
Payer: MEDICARE

## 2024-07-10 DIAGNOSIS — R53.1 GENERALIZED WEAKNESS: ICD-10-CM

## 2024-07-10 DIAGNOSIS — Z74.09 IMPAIRED FUNCTIONAL MOBILITY, BALANCE, GAIT, AND ENDURANCE: Primary | ICD-10-CM

## 2024-07-10 PROCEDURE — 97140 MANUAL THERAPY 1/> REGIONS: CPT | Mod: PN

## 2024-07-10 PROCEDURE — 97112 NEUROMUSCULAR REEDUCATION: CPT | Mod: PN

## 2024-07-10 NOTE — PROGRESS NOTES
"[facility]   Physical Therapy Treatment Note      Name: Bhavna Landry  Clinic Number: [id number]    Therapy Diagnosis: No diagnosis found.  Physician: Aleena Perdue PA-C    Visit Date: 7/10/2024    Physician Orders: PT Eval and Treat   Medical Diagnosis from Referral:   T84.84XA,Z96.659 (ICD-10-CM) - Pain in knee region after total knee replacement, initial encounter   M54.10 (ICD-10-CM) - Radicular leg pain   M54.50,G89.29 (ICD-10-CM) - Chronic left-sided low back pain without sciatica      Evaluation Date: 6/26/2024  Authorization Period Expiration: 12/31/2024  Plan of Care Expiration: 8/7/2024  Progress Note Due: 7/18/2024  Date of Surgery: none   Visit # / Visits authorized: 1/ 20   FOTO: 1/5     Precautions: Standard and hx of cancer     Time In: 4:05 pm  Time Out: 4:58 pm   Total Billable Time: 53 minutes  PTA Visit #: 0/5       Subjective     Patient reports: reports right lower extremity numbness form buttocks down to front of her ankle. Increased pain sitting on her right side. Mild relief following last visit. Not compliant with home exercise program.     She was not compliant with home exercise program.  Response to previous treatment: initial evaluation  Functional change: ongoing     Pain: 7/10 and 6/10 right lower extremity   Location: right buttocks and right lower extremity       Objective      Objective Measures updated at progress report unless specified.     Treatment     Bhavna received the treatments listed below:      therapeutic exercises to develop strength, endurance, ROM, flexibility, and core stabilization for 5 minutes including:    Supine   DKTC with swiss ball peanut: 5x (! right SIJ)    Seated:   Swiss ball roll out: 20x5" - 2 rounds       manual therapy techniques: Joint mobilizations and Manual traction were applied for 25 minutes, including:    Lateral hip distraction - grade II-III  Long axis hip distraction - grade II-III    neuromuscular re-education " "activities to improve: Coordination, Kinesthetic, Sense, and Proprioception for 23 minutes. The following activities were included:      Nu step: level 3 for 8 minutes for sciatic neural flossing.  Sciatic nerve glides: 20x right   Hook-lying hip abduction isometric: 20x5" (! right SIJ)  Hook-lying adduction isometric: 20x5" (! right SIJ)      Patient Education and Home Exercises       Education provided:   - compliance with HEP    Written Home Exercises Provided: yes. Exercises were reviewed and Bhavna was able to demonstrate them prior to the end of the session.  Bhavna demonstrated good  understanding of the education provided. See Electronic Medical Record under Patient Instructions for exercises provided during therapy sessions    Assessment     Bhavna is a 68 y.o. 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 Patient presents with signs and symptoms of chronic low back pain. Symptoms correlate with stenosis pattern of pain with right lower extremity radicular pain. Very poor tolerance to all interventions today. Unable to tolerate light hip isometrics and flexion biased interventions. Only relief with repeated swiss ball roll outs and nu-step. Will monitor response to first follow up.     Bhavna Is progressing well towards her goals.   Patient prognosis is Fair.     Patient will continue to benefit from skilled outpatient physical therapy to address the deficits listed in the problem list box on initial evaluation, provide pt/family education and to maximize pt's level of independence in the home and community environment.     Patient's spiritual, cultural and educational needs considered and pt agreeable to plan of care and goals.     Anticipated barriers to " physical therapy: chronic re-occurrences; high intensity pain levels     Goals:   Short Term Goals (3 Weeks):  1. Patient will be compliant with home exercise program to supplement therapy in promoting functional mobility.  2. Patient will perform transverse abdominus activation with good control to demonstrate improved core strength.  3. Patient will report no pain during bed mobility transitions to promote functional mobility.  4. Patient will improve impaired lower extremity myotomes/manual muscle tests  to >/=4/5 to improve strength for functional tasks.     Long Term Goals (6 Weeks):   1. Patient will improve FOTO score to </= 47% limited to decrease perceived limitation with maintaining/changing body position.   2. Patient will be able to sleep for >/= 3 nights without waking from pain to improve quality of life.   3. Patient will improve impaired lower extremity myotomes/manual muscle tests to >/=4+/5 to improve strength for functional tasks.  4. Patient will be able to complete 6 MWT to improve aerobic tolerance and functional lower extremity strength and endurance.     Plan     Pain modalities prn   Core stabilization and lumbopelvic stabilization       Callie Emanuel, PT

## 2024-07-12 PROBLEM — Z74.09 IMPAIRED FUNCTIONAL MOBILITY, BALANCE, GAIT, AND ENDURANCE: Status: ACTIVE | Noted: 2024-07-12

## 2024-07-12 PROBLEM — R53.1 GENERALIZED WEAKNESS: Status: ACTIVE | Noted: 2024-07-12

## 2024-07-13 ENCOUNTER — PATIENT MESSAGE (OUTPATIENT)
Dept: INTERNAL MEDICINE | Facility: CLINIC | Age: 69
End: 2024-07-13
Payer: MEDICARE

## 2024-07-13 DIAGNOSIS — M46.1 BILATERAL SACROILIITIS: Primary | ICD-10-CM

## 2024-07-15 ENCOUNTER — CLINICAL SUPPORT (OUTPATIENT)
Dept: REHABILITATION | Facility: HOSPITAL | Age: 69
End: 2024-07-15
Payer: MEDICARE

## 2024-07-15 DIAGNOSIS — R53.1 GENERALIZED WEAKNESS: ICD-10-CM

## 2024-07-15 DIAGNOSIS — Z74.09 IMPAIRED FUNCTIONAL MOBILITY, BALANCE, GAIT, AND ENDURANCE: Primary | ICD-10-CM

## 2024-07-15 PROCEDURE — 97112 NEUROMUSCULAR REEDUCATION: CPT | Mod: PN

## 2024-07-15 NOTE — TELEPHONE ENCOUNTER
Pt requesting Lidocaine patches for her back, states this is a chronic issues she deals with such as. Pt states patches were once ordered by an ER doctor but needs this ordered by Dr. Vargas in order to be covered by her insurance

## 2024-07-15 NOTE — PROGRESS NOTES
"[facility]   Physical Therapy Treatment Note      Name: Bhavna Landry  Clinic Number: [id number]    Therapy Diagnosis:   Encounter Diagnoses   Name Primary?    Impaired functional mobility, balance, gait, and endurance Yes    Generalized weakness      Physician: Aleena Perdue PA-C    Visit Date: 7/15/2024    Physician Orders: PT Eval and Treat   Medical Diagnosis from Referral:   T84.84XA,Z96.659 (ICD-10-CM) - Pain in knee region after total knee replacement, initial encounter   M54.10 (ICD-10-CM) - Radicular leg pain   M54.50,G89.29 (ICD-10-CM) - Chronic left-sided low back pain without sciatica      Evaluation Date: 6/26/2024  Authorization Period Expiration: 12/31/2024  Plan of Care Expiration: 8/7/2024  Progress Note Due: 7/18/2024  Date of Surgery: none   Visit # / Visits authorized: 2/ 20   FOTO: 2/5     Precautions: Standard and hx of cancer     Time In: 1:00 pm   Time Out: 1:50 pm   Total Billable Time: 50 minutes  PTA Visit #: 0/5       Subjective     Patient reports: radiating pain from right buttocks down leg to front of shin and ankle.     She was not compliant with home exercise program.  Response to previous treatment: initial evaluation  Functional change: ongoing     Pain: 7/10 and 6/10 right lower extremity   Location: right buttocks and right lower extremity       Objective      Objective Measures updated at progress report unless specified.     Treatment     Bhavna received the treatments listed below:      therapeutic exercises to develop strength, endurance, ROM, flexibility, and core stabilization for 12 minutes including:    Nu step: level 3 for 8 minutes for sciatic neural flossing.    Elevated head of bed in hook-lying with heat pack:  Single knee to chest: 5x10"  LTR: 1 minute     manual therapy techniques: Joint mobilizations and Manual traction were applied for 5 minutes, including:    Long axis hip distraction - grade II-III right   Quad cane adjustment " "    neuromuscular re-education activities to improve: Coordination, Kinesthetic, Sense, and Proprioception for 33 minutes. The following activities were included:    Elevated head of bed in hook-lying with heat pack:  Sciatic nerve glides propped on swiss ball: 2x20 bilateral   Glute set: 10x10"  Submaximal hip abduction isometric: 10x5" into gait belt (25% contraction)  Submaximal hip adduction isometric: 10x5" into yellow ball (25% contraction)  Transverse abdominus activation: 10x5"      Patient Education and Home Exercises       Education provided:   - compliance with HEP    Written Home Exercises Provided: yes. Exercises were reviewed and Bhavna was able to demonstrate them prior to the end of the session.  Bhavna demonstrated good  understanding of the education provided. See Electronic Medical Record under Patient Instructions for exercises provided during therapy sessions    Assessment     Bhavna is a 68 y.o. 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 Patient presents with signs and symptoms of chronic low back pain. Symptoms correlate with stenosis pattern of pain with right lower extremity radicular pain. Better tolerance to interventions in hook-lying with elevated head of bed and heat pack. Able to tolerate submaximal lumbopelvic stabilization and flexion biased interventions for spinal decompression. Some discomfort with glute setting.       Bhavna Is progressing well towards her goals.   Patient prognosis is Fair.     Patient will continue to benefit from skilled outpatient physical therapy to address the deficits listed in the problem list box on initial evaluation, provide pt/family education and to maximize pt's level of independence in the home and " community environment.     Patient's spiritual, cultural and educational needs considered and pt agreeable to plan of care and goals.     Anticipated barriers to physical therapy: chronic re-occurrences; high intensity pain levels     Goals:   Short Term Goals (3 Weeks):  1. Patient will be compliant with home exercise program to supplement therapy in promoting functional mobility.  2. Patient will perform transverse abdominus activation with good control to demonstrate improved core strength.  3. Patient will report no pain during bed mobility transitions to promote functional mobility.  4. Patient will improve impaired lower extremity myotomes/manual muscle tests  to >/=4/5 to improve strength for functional tasks.     Long Term Goals (6 Weeks):   1. Patient will improve FOTO score to </= 47% limited to decrease perceived limitation with maintaining/changing body position.   2. Patient will be able to sleep for >/= 3 nights without waking from pain to improve quality of life.   3. Patient will improve impaired lower extremity myotomes/manual muscle tests to >/=4+/5 to improve strength for functional tasks.  4. Patient will be able to complete 6 MWT to improve aerobic tolerance and functional lower extremity strength and endurance.     Plan     Pain modalities prn   Core stabilization and lumbopelvic stabilization       Callie Emanuel, PT

## 2024-07-15 NOTE — PRE-PROCEDURE INSTRUCTIONS
Patient reviewed on 7/15/2024.  Okay to proceed at [place]. The following pre-procedure instructions and arrival time have been sent to patient portal for review.  Patient replied to portal message.    Dear Bhavna ,     Please read over the following pre-procedure instructions in it's entirety as there is helpful information here to get you well prepared for your upcoming procedure.              You are scheduled for a procedure with Dr. Storm on 7/17/2024.    Your scheduled arrival time is 10:40 am.  This arrival time is roughly 1 hour before your anticipated procedure time to allow sufficient time for pre-op..    Please wear comfortable clothes. You will be placed in a gown for your procedure.  Please do not wear a dress.  This procedure will take place at the [facility] at the corner of [facility] and [facility].  It is in the [place] [facility] next to [facility].  The address is:     [address] [place] [address].  [place] [address]     After entering the building, you will proceed to the second floor where you can check in with registration. You should take any medications that you routinely take for blood pressure, heart medications, thyroid, cholesterol, etc.      The fasting restrictions are dependent on whether or not you are receiving sedation.  Sedation is not available for all procedures.      Your fasting instructions are as follow:  IV sedation. You should not eat for 8 hours and can only drink clear liquids (water or black coffee without cream/sugar) up until 2 hours before your scheduled time.  You CANNOT drive yourself and must have a .     If you are on blood thinners, you need to follow the anticoagulation instructions that had been discussed previously.  You should only stop the blood thinners if it was approved by your primary care physician or your cardiologist.  In the event that you are not able to stop your blood thinners, a blood thinner was not listed on  your medication list, or we were not able to get clearance from your cardiologist, then the procedure may have to be postponed/canceled.      IF you were told to stop your blood thinners, this is how long you should generally hold some of the more common ones.  Remember that stopping blood thinners is only necessary for certain procedures. If you are unsure of your instructions, please call us.   Aspirin - 5 days  Plavix/Clopidogrel - 7 days  Warfarin / Coumadin - 5 days  Eliquis - 3 days  Pradaxa/Dabigatran - 4 days  Xarelto/Rivaroxaban - 3 days     If you are a diabetic, do not take your medication if you will be fasting, but bring it with you. Please plan on being here for roughly 3 hours.     Please call us if you have been sick (running fever, having any flu-like symptoms) or have been taking ANTIBIOTICS in the past 2 weeks or had any outpatient procedures other than with us (colonoscopy, endoscopy, OBGYN, dental, etc.).     If you have been previously COVID positive, you will need to hold off on your procedure until you are symptom free for 10 days. If you did not have any symptoms, you can have your procedure 10 days from your positive test result.       On the morning of your procedure:  *HOLD ALL VITAMINS, MINERALS, HERBS (INCLUDING HERBAL TEAS) AND SUPPLEMENTS  *SHOWER WITH ANTIBACTERIAL SOAP (EX. DIAL) NIGHT BEFORE AND MORNING OF PROCEDURE  *DO NOT APPLY ANY LOTIONS, OILS, POWDERS, PERFUME/COLOGNE, OINTMENTS, GELS, CREAMS, MAKEUP OR DEODORANT TO YOUR SKIN MORNING OF PROCEDURE  *LEAVE JEWELRY AND ANY VALUABLES AT HOME  *WEAR LOOSE COMFORTABLE CLOTHING (PREFERABLY A BUTTON UP SHIRT)     Please reply to this message as receipt of delivery.     Thank you,  [facility]  [place], LPN [facility]  Pre-Admit

## 2024-07-16 ENCOUNTER — HOSPITAL ENCOUNTER (OUTPATIENT)
Dept: RADIOLOGY | Facility: HOSPITAL | Age: 69
Discharge: HOME | End: 2024-07-16
Attending: REGISTERED NURSE
Payer: MEDICARE

## 2024-07-16 DIAGNOSIS — M54.9 DORSALGIA, UNSPECIFIED: ICD-10-CM

## 2024-07-16 PROCEDURE — 72148 MRI LUMBAR SPINE W/O DYE: CPT | Mod: 26,,, | Performed by: RADIOLOGY

## 2024-07-16 PROCEDURE — 72148 MRI LUMBAR SPINE W/O DYE: CPT | Mod: TC

## 2024-07-17 ENCOUNTER — HOSPITAL ENCOUNTER (OUTPATIENT)
Facility: HOSPITAL | Age: 69
Discharge: HOME | End: 2024-07-17
Attending: STUDENT IN AN ORGANIZED HEALTH CARE EDUCATION/TRAINING PROGRAM | Admitting: STUDENT IN AN ORGANIZED HEALTH CARE EDUCATION/TRAINING PROGRAM
Payer: MEDICARE

## 2024-07-17 VITALS
SYSTOLIC BLOOD PRESSURE: 115 MMHG | BODY MASS INDEX: 16.5 KG/M2 | WEIGHT: 99 LBS | HEART RATE: 60 BPM | DIASTOLIC BLOOD PRESSURE: 57 MMHG | OXYGEN SATURATION: 94 % | RESPIRATION RATE: 16 BRPM | TEMPERATURE: 98 F | HEIGHT: 65 IN

## 2024-07-17 DIAGNOSIS — M51.36 DDD (DEGENERATIVE DISC DISEASE), LUMBAR: ICD-10-CM

## 2024-07-17 DIAGNOSIS — G89.29 CHRONIC PAIN: ICD-10-CM

## 2024-07-17 DIAGNOSIS — M54.16 LUMBAR RADICULOPATHY: Primary | ICD-10-CM

## 2024-07-17 PROCEDURE — 63600175 PHARM REV CODE 636 W HCPCS: Performed by: STUDENT IN AN ORGANIZED HEALTH CARE EDUCATION/TRAINING PROGRAM

## 2024-07-17 PROCEDURE — 64484 NJX AA&/STRD TFRM EPI L/S EA: CPT | Mod: RT | Performed by: STUDENT IN AN ORGANIZED HEALTH CARE EDUCATION/TRAINING PROGRAM

## 2024-07-17 PROCEDURE — 25000003 PHARM REV CODE 250: Performed by: STUDENT IN AN ORGANIZED HEALTH CARE EDUCATION/TRAINING PROGRAM

## 2024-07-17 PROCEDURE — 64483 NJX AA&/STRD TFRM EPI L/S 1: CPT | Mod: RT | Performed by: STUDENT IN AN ORGANIZED HEALTH CARE EDUCATION/TRAINING PROGRAM

## 2024-07-17 PROCEDURE — 64483 NJX AA&/STRD TFRM EPI L/S 1: CPT | Mod: RT,,, | Performed by: STUDENT IN AN ORGANIZED HEALTH CARE EDUCATION/TRAINING PROGRAM

## 2024-07-17 PROCEDURE — 64484 NJX AA&/STRD TFRM EPI L/S EA: CPT | Mod: RT,,, | Performed by: STUDENT IN AN ORGANIZED HEALTH CARE EDUCATION/TRAINING PROGRAM

## 2024-07-17 PROCEDURE — 25500020 PHARM REV CODE 255: Performed by: STUDENT IN AN ORGANIZED HEALTH CARE EDUCATION/TRAINING PROGRAM

## 2024-07-17 RX ORDER — FENTANYL CITRATE 50 UG/ML
INJECTION, SOLUTION INTRAMUSCULAR; INTRAVENOUS
Status: DISCONTINUED | OUTPATIENT
Start: 2024-07-17 | End: 2024-07-17 | Stop reason: HOSPADM

## 2024-07-17 RX ORDER — LIDOCAINE HYDROCHLORIDE 20 MG/ML
INJECTION, SOLUTION EPIDURAL; INFILTRATION; INTRACAUDAL; PERINEURAL
Status: DISCONTINUED | OUTPATIENT
Start: 2024-07-17 | End: 2024-07-17 | Stop reason: HOSPADM

## 2024-07-17 RX ORDER — SODIUM CHLORIDE 9 MG/ML
INJECTION, SOLUTION INTRAVENOUS CONTINUOUS
Status: DISCONTINUED | OUTPATIENT
Start: 2024-07-17 | End: 2024-07-17 | Stop reason: HOSPADM

## 2024-07-17 RX ORDER — DEXAMETHASONE SODIUM PHOSPHATE 10 MG/ML
INJECTION INTRAMUSCULAR; INTRAVENOUS
Status: DISCONTINUED | OUTPATIENT
Start: 2024-07-17 | End: 2024-07-17 | Stop reason: HOSPADM

## 2024-07-17 RX ORDER — LIDOCAINE 50 MG/G
1 PATCH TOPICAL DAILY
Qty: 30 PATCH | Refills: 0 | Status: SHIPPED | OUTPATIENT
Start: 2024-07-17

## 2024-07-17 RX ORDER — LIDOCAINE HYDROCHLORIDE 10 MG/ML
INJECTION, SOLUTION EPIDURAL; INFILTRATION; INTRACAUDAL; PERINEURAL
Status: DISCONTINUED | OUTPATIENT
Start: 2024-07-17 | End: 2024-07-17 | Stop reason: HOSPADM

## 2024-07-17 RX ORDER — MIDAZOLAM HYDROCHLORIDE 1 MG/ML
INJECTION INTRAMUSCULAR; INTRAVENOUS
Status: DISCONTINUED | OUTPATIENT
Start: 2024-07-17 | End: 2024-07-17 | Stop reason: HOSPADM

## 2024-07-17 NOTE — PLAN OF CARE
Pt in preop bay 27, VSS and IV inserted. Pt denies any open wounds on body or the use of any weight loss injections.

## 2024-07-17 NOTE — DISCHARGE SUMMARY
Discharge Note  Short Stay      SUMMARY     Admit Date: 7/17/2024    Attending Physician: Maile Storm      Discharge Physician: aMile Storm      Discharge Date: 7/17/2024 12:26 PM    Procedure(s) (LRB):  Right  L3/4  and L4/5 TFESI (Right)    Final Diagnosis: Lumbar radiculopathy [M54.16]    Disposition: Home or self care    Patient Instructions:   Current Discharge Medication List        CONTINUE these medications which have NOT CHANGED    Details   albuterol (PROVENTIL) 2.5 mg /3 mL (0.083 %) nebulizer solution Take 3 mLs (2.5 mg total) by nebulization every 6 (six) hours as needed for Wheezing. Rescue  Qty: 90 each, Refills: 2    Associated Diagnoses: Chronic bronchitis, unspecified chronic bronchitis type      amLODIPine (NORVASC) 5 MG tablet Take 1 tablet (5 mg total) by mouth 2 (two) times a day.  Qty: 180 tablet, Refills: 1    Comments: .  Associated Diagnoses: Essential hypertension      ascorbic acid, vitamin C, (VITAMIN C) 500 MG tablet Take 500 mg by mouth once daily.      atorvastatin (LIPITOR) 80 MG tablet TAKE 1 TABLET(80 MG) BY MOUTH EVERY DAY  Qty: 90 tablet, Refills: 0    Associated Diagnoses: Hyperlipidemia, unspecified hyperlipidemia type      benztropine (COGENTIN) 0.5 MG tablet TAKE 1 TABLET(0.5 MG) BY MOUTH TWICE DAILY  Qty: 180 tablet, Refills: 3    Associated Diagnoses: Extrapyramidal symptom      budesonide-formoterol 80-4.5 mcg (SYMBICORT) 80-4.5 mcg/actuation HFAA INHALE 2 PUFFS BY MOUTH TWICE DAILY  Qty: 10.2 g, Refills: 2      exemestane (AROMASIN) 25 mg tablet Take 1 tablet (25 mg total) by mouth once daily.  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      fluticasone propionate (FLONASE) 50 mcg/actuation nasal spray 1 spray by Each Nostril route once daily.      folic acid  (FOLVITE) 1 MG tablet Take 1 tablet (1,000 mcg total) by mouth once daily.  Qty: 90 tablet, Refills: 3    Comments: Requesting 1 year supply  Associated Diagnoses: Psoriatic arthritis      HYDROcodone-acetaminophen (NORCO) 5-325 mg per tablet Take 1 tablet by mouth every 12 (twelve) hours as needed for Pain.  Qty: 60 tablet, Refills: 0    Comments: Quantity prescribed more than 7 day supply? Yes, quantity medically necessary  Associated Diagnoses: Chronic low back pain without sciatica, unspecified back pain laterality      isosorbide mononitrate (IMDUR) 30 MG 24 hr tablet Take 1 tablet (30 mg total) by mouth once daily.  Qty: 30 tablet, Refills: 3      !! omeprazole (PRILOSEC) 20 MG capsule TAKE 1 CAPSULE(20 MG) BY MOUTH EVERY DAY  Qty: 30 capsule, Refills: 11    Associated Diagnoses: Gastroesophageal reflux disease, unspecified whether esophagitis present; Erosive gastropathy      pregabalin (LYRICA) 50 MG capsule Take 1 capsule (50 mg total) by mouth 3 (three) times daily.  Qty: 90 capsule, Refills: 5    Associated Diagnoses: Juvenile idiopathic scoliosis of thoracolumbar region; Lumbar radiculopathy      !! risperiDONE (RISPERDAL) 2 MG tablet TAKE 1 TABLET(2 MG) BY MOUTH EVERY MORNING  Qty: 90 tablet, Refills: 3    Associated Diagnoses: Paranoid schizophrenia      !! risperiDONE (RISPERDAL) 4 MG tablet TAKE 1 TABLET(4 MG) BY MOUTH EVERY EVENING  Qty: 90 tablet, Refills: 3    Associated Diagnoses: Paranoid schizophrenia      venlafaxine (EFFEXOR-XR) 75 MG 24 hr capsule Take 1 capsule (75 mg total) by mouth once daily.  Qty: 90 capsule, Refills: 3    Comments: Patient is now taking 75mg daily.  Please cancel all other prescriptions.  Associated Diagnoses: Anxiety; Recurrent major depressive disorder, in full remission      vitamin D (VITAMIN D3) 1000 units Tab Take 1,000 Units by mouth once daily.      vitamin E 200 UNIT capsule Take 200 Units by mouth once daily.      zinc gluconate 50 mg tablet Take 50 mg by  mouth once daily.      albuterol (PROVENTIL/VENTOLIN HFA) 90 mcg/actuation inhaler USE 2 INHALATIONS BY MOUTH 4  TIMES DAILY AS NEEDED  Qty: 34 g, Refills: 3    Comments: Please send a replace/new response with 90-Day Supply if appropriate to maximize member benefit. Requesting 1 year supply.      aspirin (ECOTRIN) 81 MG EC tablet Take 81 mg by mouth once daily.      baclofen (LIORESAL) 10 MG tablet 1 tab po tid for muscle cramps.  Qty: 60 tablet, Refills: 1      ciclopirox (PENLAC) 8 % Soln Apply topically nightly. Paint on affected nail(s) once per night, remove residue once per week with alcohol  Qty: 6.6 mL, Refills: 3    Associated Diagnoses: Tinea unguium      cloNIDine (CATAPRES) 0.1 MG tablet 1 tab po q day for systolic BP > 160 or DBP >100.  Qty: 30 tablet, Refills: 1    Comments: .      LIDOcaine (LIDODERM) 5 % Place 1 patch onto the skin once daily. Remove & Discard patch within 12 hours or as directed by MD  Qty: 30 patch, Refills: 0    Associated Diagnoses: Bilateral sacroiliitis      losartan (COZAAR) 25 MG tablet TAKE 1 TABLET EVERY DAY AS NEEDED FOR.WHEN SYSTOLIC BLOOD PRESSURE ABOVE 150  Qty: 90 tablet, Refills: 0    Comments: **Patient requests 90 days supply** - .  Associated Diagnoses: Essential hypertension      meloxicam (MOBIC) 7.5 MG tablet Take 1 tablet (7.5 mg total) by mouth once daily.  Qty: 14 tablet, Refills: 0    Associated Diagnoses: Juvenile idiopathic scoliosis of thoracolumbar region; Lumbar radiculopathy      methotrexate 2.5 MG Tab TAKE 8 TABLETS BY MOUTH EVERY 7 DAYS. TAKE 4 TABLETS MONDAY MORNING AND TAKE 4 TABLETS MOND NIGHT ONLY  Qty: 144 tablet, Refills: 0    Comments: **Patient requests 90 days supply**  Associated Diagnoses: Psoriatic arthritis      omega-3 fatty acids/fish oil (FISH OIL-OMEGA-3 FATTY ACIDS) 300-1,000 mg capsule Take by mouth once daily.      !! omeprazole (PRILOSEC) 40 MG capsule Take 1 capsule (40 mg total) by mouth every morning.  Qty: 90 capsule,  Refills: 3    Associated Diagnoses: Gastroesophageal reflux disease, unspecified whether esophagitis present       !! - Potential duplicate medications found. Please discuss with provider.              Discharge Diagnosis: Lumbar radiculopathy [M54.16]  Condition on Discharge: Stable with no complications to procedure   Diet on Discharge: Same as before.  Activity: as per instruction sheet.  Discharge to: Home with a responsible adult.  Follow up: 2-4 weeks       Please call my office or pager at [phone] if experienced any weakness or loss of sensation, fever > 101.5, pain uncontrolled with oral medications, persistent nausea/vomiting/or diarrhea, redness or drainage from the incisions, or any other worrisome concerns. If physician on call was not reached or could not communicate with our office for any reason please go to the nearest emergency department

## 2024-07-17 NOTE — OP NOTE
Lumbar Transforaminal Epidural Steroid Injection under Fluoroscopic Guidance    The procedure, risks, benefits, and options were discussed with the patient. There are no contraindications to the procedure. The patent expressed understanding and agreed to the procedure. Informed written consent was obtained prior to the start of the procedure and can be found in the patient's chart.    PATIENT NAME: Bhavna Landry   MRN: [id number]     DATE OF PROCEDURE: 07/17/2024    PROCEDURE:  Right  L3/4 and L4/5 Lumbar Transforaminal Epidural Steroid Injection under Fluoroscopic Guidance    PRE-OP DIAGNOSIS: Lumbar radiculopathy [M54.16] Lumbar radiculopathy [M54.16]    POST-OP DIAGNOSIS: Same    PHYSICIAN: Maile Storm DO    ASSISTANTS: None     MEDICATIONS INJECTED: Preservative-free Decadron 10mg with 5cc of Lidocaine 1% MPF     LOCAL ANESTHETIC INJECTED: Xylocaine 2%     SEDATION: Versed 2mg and Fentanyl 50mcg                                                                                                                                                                                     Conscious sedation ordered by M.D. Patient re-evaluation prior to administration of conscious sedation. No changes noted in patient's status from initial evaluation. The patient's vital signs were monitored by RN and patient remained hemodynamically stable throughout the procedure.    Event Time In   Sedation Start 1221   Sedation End 1224       ESTIMATED BLOOD LOSS: None    COMPLICATIONS: None    TECHNIQUE: Time-out was performed to identify the patient and procedure to be performed. With the patient laying in a prone position, the surgical area was prepped and draped in the usual sterile fashion using ChloraPrep and a fenestrated drape.The levels were determined under fluoroscopy guidance. Skin anesthesia was achieved by injecting Lidocaine 2% over the injection sites. The transforaminal spaces were then approached with a 22 gauge, 3.5  inch spinal quinke needle that was introduced under fluoroscopic guidance in the AP and Lateral views. Once the needle tip was in the area of the transforaminal space, and there was no blood, CSF or paraesthesias, contrast dye Omnipaque (300mg/mL) was injected to confirm placement and there was no vascular runoff. Fluoroscopic imaging in the AP and lateral views revealed a clear outline of the spinal nerve with proximal spread of agent through the neural foramen into the epidural space. 3 mL of the medication mixture listed above was injected slowly at each site. Displacement of the radio opaque contrast after injection of the medication confirmed that the medication went into the area of the transforaminal spaces. The needles were removed and bleeding was nil. A sterile dressing was applied. No specimens collected. The patient tolerated the procedure well.       The patient was monitored after the procedure in the recovery area. They were given post-procedure and discharge instructions to follow at home. The patient was discharged in a stable condition.      Maile Storm DO

## 2024-07-21 DIAGNOSIS — E78.5 HYPERLIPIDEMIA, UNSPECIFIED HYPERLIPIDEMIA TYPE: ICD-10-CM

## 2024-07-21 NOTE — TELEPHONE ENCOUNTER
No care due was identified.  [facility] Embedded Care Due Messages. Reference number: [id number].   7/21/2024 5:44:44 PM CDT

## 2024-07-22 ENCOUNTER — CLINICAL SUPPORT (OUTPATIENT)
Dept: REHABILITATION | Facility: HOSPITAL | Age: 69
End: 2024-07-22
Payer: MEDICARE

## 2024-07-22 DIAGNOSIS — R53.1 GENERALIZED WEAKNESS: ICD-10-CM

## 2024-07-22 DIAGNOSIS — Z74.09 IMPAIRED FUNCTIONAL MOBILITY, BALANCE, GAIT, AND ENDURANCE: Primary | ICD-10-CM

## 2024-07-22 PROCEDURE — 97112 NEUROMUSCULAR REEDUCATION: CPT | Mod: PN

## 2024-07-22 RX ORDER — ATORVASTATIN CALCIUM 80 MG/1
80 TABLET, FILM COATED ORAL
Qty: 90 TABLET | Refills: 0 | Status: SHIPPED | OUTPATIENT
Start: 2024-07-22

## 2024-07-22 NOTE — PROGRESS NOTES
"[facility]   Physical Therapy Treatment Note      Name: Bhavna Landry  Clinic Number: [id number]    Therapy Diagnosis:   Encounter Diagnoses   Name Primary?    Impaired functional mobility, balance, gait, and endurance Yes    Generalized weakness      Physician: Aleena Perdue PA-C    Visit Date: 7/22/2024    Physician Orders: PT Eval and Treat   Medical Diagnosis from Referral:   T84.84XA,Z96.659 (ICD-10-CM) - Pain in knee region after total knee replacement, initial encounter   M54.10 (ICD-10-CM) - Radicular leg pain   M54.50,G89.29 (ICD-10-CM) - Chronic left-sided low back pain without sciatica      Evaluation Date: 6/26/2024  Authorization Period Expiration: 12/31/2024  Plan of Care Expiration: 8/7/2024  Progress Note Due: 7/18/2024  Date of Surgery: none   Visit # / Visits authorized: 3/ 20   FOTO: 3/5     Precautions: Standard and hx of cancer     Time In: 1:03 pm   Time Out: 2:00 pm   Total Billable Time: 57 minutes  PTA Visit #: 0/5       Subjective     Patient reports: feeling much better since injection on Thursday. No longer having right lower extremity pain. Able to walk better.     She was not compliant with home exercise program.  Response to previous treatment: improved pain levels  Functional change: improved ability to ambulate    Pain: 3/10   Location: low back      Objective      Objective Measures updated at progress report unless specified.     Treatment     Bhavna received the treatments listed below:      therapeutic exercises to develop strength, endurance, ROM, flexibility, and core stabilization for 15 minutes including:    Nu step: level 3 for 8 minutes for sciatic neural flossing.    Elevated head of bed in hook-lying with heat pack:  Single knee to chest: 5x10"  LTR: 1 minute   Seated swiss ball roll outs: 20x5" with peanut       neuromuscular re-education activities to improve: Coordination, Kinesthetic, Sense, and Proprioception for 42 minutes. The " "following activities were included:    Elevated head of bed in hook-lying with heat pack:    Sciatic nerve glides propped on swiss ball: 2x20 bilateral   Glute set: 10x10"  Submaximal hip abduction isometric: 10x5" into gait belt (25% contraction)  Submaximal hip adduction isometric: 10x5" into yellow ball (25% contraction)  Transverse abdominus activation: 20x5"  Clamshells: green theraband - 3x10  Standing rows: green theraband 3x10  Standing SAPD: green theraband 3x10  Palloff press: doubled red - 15x each  Lateral band walks: 5 laps length of mat         Patient Education and Home Exercises       Education provided:   - compliance with HEP    Written Home Exercises Provided: yes. Exercises were reviewed and Bhavna was able to demonstrate them prior to the end of the session.  Bhavna demonstrated good  understanding of the education provided. See Electronic Medical Record under Patient Instructions for exercises provided during therapy sessions    Assessment     Bhavna is a 68 y.o. 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 Patient presents with signs and symptoms of chronic low back pain. Symptoms correlate with stenosis pattern of pain with right lower extremity radicular pain. Significant improvements in low back pain and right lower extremity pain since injection on Thursday. Greater ease with ambulation, transitions, and bed mobility. Able to progress gluteal strengthening and posterior chain activation without increased pain levels. Moderate cueing for appropriate intervention performance. Will monitor response to progressions and adjust accordingly.      Bhavna Is progressing well towards her goals.   Patient prognosis is Fair.     Patient will continue to " benefit from skilled outpatient physical therapy to address the deficits listed in the problem list box on initial evaluation, provide pt/family education and to maximize pt's level of independence in the home and community environment.     Patient's spiritual, cultural and educational needs considered and pt agreeable to plan of care and goals.     Anticipated barriers to physical therapy: chronic re-occurrences; high intensity pain levels     Goals:   Short Term Goals (3 Weeks):  1. Patient will be compliant with home exercise program to supplement therapy in promoting functional mobility.  2. Patient will perform transverse abdominus activation with good control to demonstrate improved core strength.  3. Patient will report no pain during bed mobility transitions to promote functional mobility.  4. Patient will improve impaired lower extremity myotomes/manual muscle tests  to >/=4/5 to improve strength for functional tasks.     Long Term Goals (6 Weeks):   1. Patient will improve FOTO score to </= 47% limited to decrease perceived limitation with maintaining/changing body position.   2. Patient will be able to sleep for >/= 3 nights without waking from pain to improve quality of life.   3. Patient will improve impaired lower extremity myotomes/manual muscle tests to >/=4+/5 to improve strength for functional tasks.  4. Patient will be able to complete 6 MWT to improve aerobic tolerance and functional lower extremity strength and endurance.     Plan     Pain modalities prn   Core stabilization and lumbopelvic stabilization       Callie Emanuel, PT

## 2024-07-22 NOTE — TELEPHONE ENCOUNTER
Refill Routing Note   Medication(s) are not appropriate for processing by [facility] for the following reason(s):        ED/Hospital Visit since last OV with provider  Required labs outdated    [facility] action(s):  Defer             Appointments  past 12m or future 3m with PCP    Date Provider   Last Visit   3/25/2024 Sadaf Vargas MD   Next Visit   Visit date not found Sadaf Vargas MD   ED visits in past 90 days: 1        Note composed:10:52 AM 07/22/2024

## 2024-07-23 ENCOUNTER — HOSPITAL ENCOUNTER (OUTPATIENT)
Dept: CARDIOLOGY | Facility: HOSPITAL | Age: 69
Discharge: HOME | End: 2024-07-23
Attending: INTERNAL MEDICINE
Payer: MEDICARE

## 2024-07-23 ENCOUNTER — LAB VISIT (OUTPATIENT)
Dept: LAB | Facility: HOSPITAL | Age: 69
End: 2024-07-23
Attending: INTERNAL MEDICINE
Payer: MEDICARE

## 2024-07-23 VITALS — HEIGHT: 65 IN | BODY MASS INDEX: 16.5 KG/M2 | HEART RATE: 73 BPM | WEIGHT: 99 LBS

## 2024-07-23 DIAGNOSIS — E78.5 HYPERLIPIDEMIA, UNSPECIFIED HYPERLIPIDEMIA TYPE: ICD-10-CM

## 2024-07-23 DIAGNOSIS — I48.0 PAF (PAROXYSMAL ATRIAL FIBRILLATION): ICD-10-CM

## 2024-07-23 LAB
CHOLEST SERPL-MCNC: 140 MG/DL (ref 120–199)
CHOLEST/HDLC SERPL: 1.6 {RATIO} (ref 2–5)
HDLC SERPL-MCNC: 87 MG/DL (ref 40–75)
HDLC SERPL: 62.1 % (ref 20–50)
LDLC SERPL CALC-MCNC: 43.8 MG/DL (ref 63–159)
NONHDLC SERPL-MCNC: 53 MG/DL
TRIGL SERPL-MCNC: 46 MG/DL (ref 30–150)

## 2024-07-23 PROCEDURE — 93306 TTE W/DOPPLER COMPLETE: CPT

## 2024-07-23 PROCEDURE — 93005 ELECTROCARDIOGRAM TRACING: CPT

## 2024-07-23 PROCEDURE — 36415 COLL VENOUS BLD VENIPUNCTURE: CPT | Mod: PO | Performed by: INTERNAL MEDICINE

## 2024-07-23 PROCEDURE — 80061 LIPID PANEL: CPT | Performed by: INTERNAL MEDICINE

## 2024-07-23 PROCEDURE — 93306 TTE W/DOPPLER COMPLETE: CPT | Mod: 26,,, | Performed by: STUDENT IN AN ORGANIZED HEALTH CARE EDUCATION/TRAINING PROGRAM

## 2024-07-23 PROCEDURE — 93010 ELECTROCARDIOGRAM REPORT: CPT | Mod: ,,, | Performed by: STUDENT IN AN ORGANIZED HEALTH CARE EDUCATION/TRAINING PROGRAM

## 2024-07-24 ENCOUNTER — NURSE TRIAGE (OUTPATIENT)
Dept: ADMINISTRATIVE | Facility: CLINIC | Age: 69
End: 2024-07-24
Payer: MEDICARE

## 2024-07-24 LAB
ASCENDING AORTA: 2.84 CM
AV INDEX (PROSTH): 0.63
AV MEAN GRADIENT: 7 MMHG
AV PEAK GRADIENT: 14 MMHG
AV VALVE AREA BY VELOCITY RATIO: 1.76 CM²
AV VALVE AREA: 1.79 CM²
AV VELOCITY RATIO: 0.62
BSA FOR ECHO PROCEDURE: 1.43 M2
CV ECHO LV RWT: 0.32 CM
DOP CALC AO PEAK VEL: 1.9 M/S
DOP CALC AO VTI: 45.03 CM
DOP CALC LVOT AREA: 2.8 CM2
DOP CALC LVOT DIAMETER: 1.9 CM
DOP CALC LVOT PEAK VEL: 1.18 M/S
DOP CALC LVOT STROKE VOLUME: 80.76 CM3
DOP CALCLVOT PEAK VEL VTI: 28.5 CM
E WAVE DECELERATION TIME: 198.52 MSEC
E/A RATIO: 0.78
E/E' RATIO: 12.91 M/S
ECHO LV POSTERIOR WALL: 0.7 CM (ref 0.6–1.1)
FRACTIONAL SHORTENING: 33 % (ref 28–44)
INTERVENTRICULAR SEPTUM: 0.74 CM (ref 0.6–1.1)
IVRT: 111.32 MSEC
LA MAJOR: 5.23 CM
LA MINOR: 5.5 CM
LA WIDTH: 3.32 CM
LEFT ATRIUM SIZE: 2.67 CM
LEFT ATRIUM VOLUME INDEX MOD: 20.4 ML/M2
LEFT ATRIUM VOLUME INDEX: 27.5 ML/M2
LEFT ATRIUM VOLUME MOD: 29.96 CM3
LEFT ATRIUM VOLUME: 40.4 CM3
LEFT INTERNAL DIMENSION IN SYSTOLE: 2.95 CM (ref 2.1–4)
LEFT VENTRICLE DIASTOLIC VOLUME INDEX: 59.8 ML/M2
LEFT VENTRICLE DIASTOLIC VOLUME: 87.9 ML
LEFT VENTRICLE MASS INDEX: 65 G/M2
LEFT VENTRICLE SYSTOLIC VOLUME INDEX: 22.8 ML/M2
LEFT VENTRICLE SYSTOLIC VOLUME: 33.47 ML
LEFT VENTRICULAR INTERNAL DIMENSION IN DIASTOLE: 4.4 CM (ref 3.5–6)
LEFT VENTRICULAR MASS: 95.44 G
LV LATERAL E/E' RATIO: 10.14 M/S
LV SEPTAL E/E' RATIO: 17.75 M/S
MV PEAK A VEL: 0.91 M/S
MV PEAK E VEL: 0.71 M/S
MV STENOSIS PRESSURE HALF TIME: 57.57 MS
MV VALVE AREA P 1/2 METHOD: 3.82 CM2
PISA TR MAX VEL: 2.5 M/S
RA MAJOR: 4.4 CM
RA PRESSURE ESTIMATED: 8 MMHG
RIGHT VENTRICLE DIASTOLIC BASEL DIMENSION: 2.5 CM
RV TB RVSP: 11 MMHG
RV TISSUE DOPPLER FREE WALL SYSTOLIC VELOCITY 1 (APICAL 4 CHAMBER VIEW): 12.08 CM/S
STJ: 2.13 CM
TDI LATERAL: 0.07 M/S
TDI SEPTAL: 0.04 M/S
TDI: 0.06 M/S
TR MAX PG: 25 MMHG
TRICUSPID ANNULAR PLANE SYSTOLIC EXCURSION: 1.98 CM
TV REST PULMONARY ARTERY PRESSURE: 33 MMHG
Z-SCORE OF LEFT VENTRICULAR DIMENSION IN END DIASTOLE: -0.01
Z-SCORE OF LEFT VENTRICULAR DIMENSION IN END SYSTOLE: 0.6

## 2024-07-25 ENCOUNTER — TELEPHONE (OUTPATIENT)
Dept: PAIN MEDICINE | Facility: CLINIC | Age: 69
End: 2024-07-25
Payer: MEDICARE

## 2024-07-25 LAB
OHS QRS DURATION: 80 MS
OHS QTC CALCULATION: 403 MS

## 2024-07-29 ENCOUNTER — CLINICAL SUPPORT (OUTPATIENT)
Dept: REHABILITATION | Facility: HOSPITAL | Age: 69
End: 2024-07-29
Payer: MEDICARE

## 2024-07-29 DIAGNOSIS — R53.1 GENERALIZED WEAKNESS: ICD-10-CM

## 2024-07-29 DIAGNOSIS — Z74.09 IMPAIRED FUNCTIONAL MOBILITY, BALANCE, GAIT, AND ENDURANCE: Primary | ICD-10-CM

## 2024-07-29 PROCEDURE — 97112 NEUROMUSCULAR REEDUCATION: CPT | Mod: PN

## 2024-07-29 PROCEDURE — 97110 THERAPEUTIC EXERCISES: CPT | Mod: PN

## 2024-07-29 NOTE — PROGRESS NOTES
"[facility]   Physical Therapy Treatment Note      Name: Bhavna Landry  Clinic Number: [id number]    Therapy Diagnosis:   Encounter Diagnoses   Name Primary?    Impaired functional mobility, balance, gait, and endurance Yes    Generalized weakness      Physician: Aleena Perdue PA-C    Visit Date: 7/29/2024    Physician Orders: PT Eval and Treat   Medical Diagnosis from Referral:   T84.84XA,Z96.659 (ICD-10-CM) - Pain in knee region after total knee replacement, initial encounter   M54.10 (ICD-10-CM) - Radicular leg pain   M54.50,G89.29 (ICD-10-CM) - Chronic left-sided low back pain without sciatica      Evaluation Date: 6/26/2024  Authorization Period Expiration: 12/31/2024  Plan of Care Expiration: 8/7/2024  Progress Note Due: 7/18/2024  Date of Surgery: none   Visit # / Visits authorized: 4/ 20   FOTO: 4/5     Precautions: Standard and hx of cancer     Time In: 4:10 pm   Time Out: 5:00 pm   Total Billable Time: 50 minutes  PTA Visit #: 0/5       Subjective     Patient reports: injection has started to wear off. Going back to how she felt before injection. Fluctuations in ability to walk. Notices most pain with transferring form sitting to standing.     She was not compliant with home exercise program.  Response to previous treatment: improved pain levels  Functional change: improved ability to ambulate    Pain: 3/10   Location: low back      Objective      Objective Measures updated at progress report unless specified.     Treatment     Bhavna received the treatments listed below:      therapeutic exercises to develop strength, endurance, ROM, flexibility, and core stabilization for 30 minutes including:    Nu step: level 3 for 8 minutes for sciatic neural flossing.    Elevated head of bed in hook-lying with heat pack:  LTR: 1 minute   Single knee to chest: 5x10"  Seated swiss ball roll outs: 20x5" with peanut       neuromuscular re-education activities to improve: Coordination, " "Kinesthetic, Sense, and Proprioception for 20 minutes. The following activities were included:    Elevated head of bed in hook-lying with heat pack:  Glute set: 10x10"  Submaximal hip abduction isometric: 10x5" into gait belt (25% contraction)  Submaximal hip adduction isometric: 10x5" into yellow ball (25% contraction)    Patient Education and Home Exercises       Education provided:   - compliance with HEP    Written Home Exercises Provided: yes. Exercises were reviewed and Bhavna was able to demonstrate them prior to the end of the session.  Bhavna demonstrated good  understanding of the education provided. See Electronic Medical Record under Patient Instructions for exercises provided during therapy sessions    Assessment     Bhavna is a 68 y.o. 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 Patient presents with signs and symptoms of chronic low back pain. Symptoms correlate with stenosis pattern of pain with right lower extremity radicular pain. Patient also has significant lumbar scoliosis contributing. Pain levels moderate to high again since injection previous week. Light isometric performance to reduce pain levels. Unsure of potential in therapy.    Bhavna Is progressing well towards her goals.   Patient prognosis is Fair.     Patient will continue to benefit from skilled outpatient physical therapy to address the deficits listed in the problem list box on initial evaluation, provide pt/family education and to maximize pt's level of independence in the home and community environment.     Patient's spiritual, cultural and educational needs considered and pt agreeable to plan of care and goals.     Anticipated barriers to physical therapy: chronic re-occurrences; " high intensity pain levels     Goals:   Short Term Goals (3 Weeks):  1. Patient will be compliant with home exercise program to supplement therapy in promoting functional mobility.  2. Patient will perform transverse abdominus activation with good control to demonstrate improved core strength.  3. Patient will report no pain during bed mobility transitions to promote functional mobility.  4. Patient will improve impaired lower extremity myotomes/manual muscle tests  to >/=4/5 to improve strength for functional tasks.     Long Term Goals (6 Weeks):   1. Patient will improve FOTO score to </= 47% limited to decrease perceived limitation with maintaining/changing body position.   2. Patient will be able to sleep for >/= 3 nights without waking from pain to improve quality of life.   3. Patient will improve impaired lower extremity myotomes/manual muscle tests to >/=4+/5 to improve strength for functional tasks.  4. Patient will be able to complete 6 MWT to improve aerobic tolerance and functional lower extremity strength and endurance.     Plan     Pain modalities prn   Core stabilization and lumbopelvic stabilization       Callie Emanuel, PT

## 2024-07-30 DIAGNOSIS — G89.29 CHRONIC LOW BACK PAIN WITHOUT SCIATICA, UNSPECIFIED BACK PAIN LATERALITY: ICD-10-CM

## 2024-07-30 DIAGNOSIS — M54.50 CHRONIC LOW BACK PAIN WITHOUT SCIATICA, UNSPECIFIED BACK PAIN LATERALITY: ICD-10-CM

## 2024-07-30 RX ORDER — HYDROCODONE BITARTRATE AND ACETAMINOPHEN 5; 325 MG/1; MG/1
1 TABLET ORAL EVERY 12 HOURS PRN
Qty: 60 TABLET | Refills: 0 | Status: SHIPPED | OUTPATIENT
Start: 2024-07-30

## 2024-07-30 NOTE — TELEPHONE ENCOUNTER
No care due was identified.  [facility] Embedded Care Due Messages. Reference number: [id number].   7/30/2024 4:23:41 PM CDT

## 2024-07-30 NOTE — TELEPHONE ENCOUNTER
----- Message from Ana Lorenzana sent at 7/30/2024  3:56 PM CDT -----  Contact: [phone]  Requesting an RX refill or new RX.    Is this a refill or new RX: refill    RX name and strength (copy/paste from chart):  HYDROcodone-acetaminophen (NORCO) 5-325 mg per tablet    Is this a 30 day or 90 day RX: 60 tablets     Pharmacy name and phone # (copy/paste from chart):    [id number] - [place] - [address] AT [facility] [address]  [address] [address]  Phone: [phone] Fax: [phone]         The doctors have asked that we provide their patients with the following 2 reminders -- prescription refills can take up to 72 hours, and a friendly reminder that in the future you can use your [facility] account to request refills: yes

## 2024-07-31 ENCOUNTER — CLINICAL SUPPORT (OUTPATIENT)
Dept: REHABILITATION | Facility: HOSPITAL | Age: 69
End: 2024-07-31
Payer: MEDICARE

## 2024-07-31 DIAGNOSIS — R53.1 GENERALIZED WEAKNESS: ICD-10-CM

## 2024-07-31 DIAGNOSIS — Z74.09 IMPAIRED FUNCTIONAL MOBILITY, BALANCE, GAIT, AND ENDURANCE: Primary | ICD-10-CM

## 2024-07-31 PROCEDURE — 97112 NEUROMUSCULAR REEDUCATION: CPT | Mod: PN

## 2024-07-31 PROCEDURE — 97110 THERAPEUTIC EXERCISES: CPT | Mod: PN

## 2024-07-31 NOTE — PROGRESS NOTES
"[facility]   Physical Therapy Treatment Note      Name: Bhavna Landry  Clinic Number: [id number]    Therapy Diagnosis:   Encounter Diagnoses   Name Primary?    Impaired functional mobility, balance, gait, and endurance Yes    Generalized weakness      Physician: Aleena Perdue PA-C    Visit Date: 2024    Physician Orders: PT Eval and Treat   Medical Diagnosis from Referral:   T84.84XA,Z96.659 (ICD-10-CM) - Pain in knee region after total knee replacement, initial encounter   M54.10 (ICD-10-CM) - Radicular leg pain   M54.50,G89.29 (ICD-10-CM) - Chronic left-sided low back pain without sciatica      Evaluation Date: 2024  Authorization Period Expiration: 2024  Plan of Care Expiration: 2024  Progress Note Due: 2024  Date of Surgery: none   Visit # / Visits authorized:    FOTO:      Precautions: Standard and hx of cancer     Time In: 4:06 pm   Time Out: 5:00 pm   Total Billable Time: 54 minutes  PTA Visit #: 0/5       Subjective     Patient reports: no improvement. If anything, worse than before SOPHY.    She was not compliant with home exercise program.  Response to previous treatment: improved pain levels  Functional change: improved ability to ambulate    Pain: 3/10   Location: low back      Objective      Objective Measures updated at progress report unless specified.     Treatment     Bhavna received the treatments listed below:      therapeutic exercises to develop strength, endurance, ROM, flexibility, and core stabilization for 10 minutes including:    Nu step: level 4 for 8 minutes for sciatic neural flossing.  Seated hamstring stretch: 3x30" each       neuromuscular re-education activities to improve: Coordination, Kinesthetic, Sense, and Proprioception for 44 minutes. The following activities were included:    Pillow under hips:  Prone lyin minute   Prone on elbows: 1 minute. 2 rounds   Prone press ups on elbows: 2x10  Prone hip flexor/quad " "stretch: 3x30" with strap  Prone Glute sets: 10x10"  Prone hip extension: 2x10    Patient Education and Home Exercises       Education provided:   - compliance with HEP    Written Home Exercises Provided: yes. Exercises were reviewed and Bhavna was able to demonstrate them prior to the end of the session.  Bhavna demonstrated good  understanding of the education provided. See Electronic Medical Record under Patient Instructions for exercises provided during therapy sessions    Assessment     Bhavna is a 68 y.o. 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 Patient presents with signs and symptoms of chronic low back pain. Initiated prone lying and repeated press up with great relief in low back pain. Positive response to repeated extension. Moderate neural tension noted with hamstring stretch. Gluteus complex remains weak.    Bhavna Is progressing well towards her goals.   Patient prognosis is Fair.     Patient will continue to benefit from skilled outpatient physical therapy to address the deficits listed in the problem list box on initial evaluation, provide pt/family education and to maximize pt's level of independence in the home and community environment.     Patient's spiritual, cultural and educational needs considered and pt agreeable to plan of care and goals.     Anticipated barriers to physical therapy: chronic re-occurrences; high intensity pain levels     Goals:   Short Term Goals (3 Weeks):  1. Patient will be compliant with home exercise program to supplement therapy in promoting functional mobility.  2. Patient will perform transverse abdominus activation with good control to demonstrate improved core strength.  3. Patient will report no pain during bed " mobility transitions to promote functional mobility.  4. Patient will improve impaired lower extremity myotomes/manual muscle tests  to >/=4/5 to improve strength for functional tasks.     Long Term Goals (6 Weeks):   1. Patient will improve FOTO score to </= 47% limited to decrease perceived limitation with maintaining/changing body position.   2. Patient will be able to sleep for >/= 3 nights without waking from pain to improve quality of life.   3. Patient will improve impaired lower extremity myotomes/manual muscle tests to >/=4+/5 to improve strength for functional tasks.  4. Patient will be able to complete 6 MWT to improve aerobic tolerance and functional lower extremity strength and endurance.     Plan     Pain modalities prn   Core stabilization and lumbopelvic stabilization       Callie Emanuel, PT

## 2024-08-01 ENCOUNTER — TELEPHONE (OUTPATIENT)
Dept: ORTHOPEDICS | Facility: CLINIC | Age: 69
End: 2024-08-01
Payer: MEDICARE

## 2024-08-05 ENCOUNTER — CLINICAL SUPPORT (OUTPATIENT)
Dept: REHABILITATION | Facility: HOSPITAL | Age: 69
End: 2024-08-05
Payer: MEDICARE

## 2024-08-05 DIAGNOSIS — Z74.09 IMPAIRED FUNCTIONAL MOBILITY, BALANCE, GAIT, AND ENDURANCE: Primary | ICD-10-CM

## 2024-08-05 DIAGNOSIS — R53.1 GENERALIZED WEAKNESS: ICD-10-CM

## 2024-08-05 PROCEDURE — 97112 NEUROMUSCULAR REEDUCATION: CPT | Mod: PN

## 2024-08-06 ENCOUNTER — PATIENT MESSAGE (OUTPATIENT)
Dept: ORTHOPEDICS | Facility: CLINIC | Age: 69
End: 2024-08-06
Payer: MEDICARE

## 2024-08-07 ENCOUNTER — CLINICAL SUPPORT (OUTPATIENT)
Dept: REHABILITATION | Facility: HOSPITAL | Age: 69
End: 2024-08-07
Payer: MEDICARE

## 2024-08-07 DIAGNOSIS — Z74.09 IMPAIRED FUNCTIONAL MOBILITY, BALANCE, GAIT, AND ENDURANCE: Primary | ICD-10-CM

## 2024-08-07 DIAGNOSIS — R53.1 GENERALIZED WEAKNESS: ICD-10-CM

## 2024-08-07 PROCEDURE — 97112 NEUROMUSCULAR REEDUCATION: CPT | Mod: KX,PN

## 2024-08-14 ENCOUNTER — HOSPITAL ENCOUNTER (EMERGENCY)
Facility: HOSPITAL | Age: 69
Discharge: HOME | End: 2024-08-14
Attending: EMERGENCY MEDICINE
Payer: MEDICARE

## 2024-08-14 ENCOUNTER — PATIENT MESSAGE (OUTPATIENT)
Dept: HEMATOLOGY/ONCOLOGY | Facility: CLINIC | Age: 69
End: 2024-08-14
Payer: MEDICARE

## 2024-08-14 VITALS
DIASTOLIC BLOOD PRESSURE: 64 MMHG | BODY MASS INDEX: 16.5 KG/M2 | RESPIRATION RATE: 19 BRPM | SYSTOLIC BLOOD PRESSURE: 127 MMHG | HEART RATE: 76 BPM | WEIGHT: 99 LBS | OXYGEN SATURATION: 98 % | TEMPERATURE: 98 F | HEIGHT: 65 IN

## 2024-08-14 DIAGNOSIS — G96.198 EPIDURAL MASS: Primary | ICD-10-CM

## 2024-08-14 DIAGNOSIS — S16.1XXA CERVICAL STRAIN, ACUTE, INITIAL ENCOUNTER: ICD-10-CM

## 2024-08-14 DIAGNOSIS — S00.03XA CONTUSION OF SCALP, INITIAL ENCOUNTER: Primary | ICD-10-CM

## 2024-08-14 DIAGNOSIS — R55 SYNCOPE: ICD-10-CM
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
## 2024-08-14 LAB
ALBUMIN SERPL BCP-MCNC: 3.6 G/DL (ref 3.5–5.2)
ALP SERPL-CCNC: 56 U/L (ref 55–135)
ALT SERPL W/O P-5'-P-CCNC: 11 U/L (ref 10–44)
ANION GAP SERPL CALC-SCNC: 7 MMOL/L (ref 8–16)
AST SERPL-CCNC: 19 U/L (ref 10–40)
BASOPHILS # BLD AUTO: 0.04 K/UL (ref 0–0.2)
BASOPHILS NFR BLD: 0.5 % (ref 0–1.9)
BILIRUB SERPL-MCNC: 0.4 MG/DL (ref 0.1–1)
BILIRUB UR QL STRIP: NEGATIVE
BUN SERPL-MCNC: 8 MG/DL (ref 8–23)
CALCIUM SERPL-MCNC: 9.3 MG/DL (ref 8.7–10.5)
CHLORIDE SERPL-SCNC: 105 MMOL/L (ref 95–110)
CLARITY UR: CLEAR
CO2 SERPL-SCNC: 25 MMOL/L (ref 23–29)
COLOR UR: YELLOW
CREAT SERPL-MCNC: 0.8 MG/DL (ref 0.5–1.4)
DIFFERENTIAL METHOD BLD: ABNORMAL
EOSINOPHIL # BLD AUTO: 0.2 K/UL (ref 0–0.5)
EOSINOPHIL NFR BLD: 2.2 % (ref 0–8)
ERYTHROCYTE [DISTWIDTH] IN BLOOD BY AUTOMATED COUNT: 14.7 % (ref 11.5–14.5)
EST. GFR  (NO RACE VARIABLE): >60 ML/MIN/1.73 M^2
GLUCOSE SERPL-MCNC: 110 MG/DL (ref 70–110)
GLUCOSE UR QL STRIP: NEGATIVE
HCT VFR BLD AUTO: 37.2 % (ref 37–48.5)
HGB BLD-MCNC: 12.6 G/DL (ref 12–16)
HGB UR QL STRIP: NEGATIVE
IMM GRANULOCYTES # BLD AUTO: 0.02 K/UL (ref 0–0.04)
IMM GRANULOCYTES NFR BLD AUTO: 0.2 % (ref 0–0.5)
KETONES UR QL STRIP: NEGATIVE
LEUKOCYTE ESTERASE UR QL STRIP: NEGATIVE
LYMPHOCYTES # BLD AUTO: 1.4 K/UL (ref 1–4.8)
LYMPHOCYTES NFR BLD: 16.6 % (ref 18–48)
MAGNESIUM SERPL-MCNC: 1.7 MG/DL (ref 1.6–2.6)
MCH RBC QN AUTO: 33.1 PG (ref 27–31)
MCHC RBC AUTO-ENTMCNC: 33.9 G/DL (ref 32–36)
MCV RBC AUTO: 98 FL (ref 82–98)
MONOCYTES # BLD AUTO: 0.6 K/UL (ref 0.3–1)
MONOCYTES NFR BLD: 6.9 % (ref 4–15)
NEUTROPHILS # BLD AUTO: 6.3 K/UL (ref 1.8–7.7)
NEUTROPHILS NFR BLD: 73.6 % (ref 38–73)
NITRITE UR QL STRIP: NEGATIVE
NRBC BLD-RTO: 0 /100 WBC
PH UR STRIP: 7 [PH] (ref 5–8)
PLATELET # BLD AUTO: 259 K/UL (ref 150–450)
PMV BLD AUTO: 8.5 FL (ref 9.2–12.9)
POTASSIUM SERPL-SCNC: 4 MMOL/L (ref 3.5–5.1)
PROT SERPL-MCNC: 5.8 G/DL (ref 6–8.4)
PROT UR QL STRIP: NEGATIVE
RBC # BLD AUTO: 3.81 M/UL (ref 4–5.4)
SODIUM SERPL-SCNC: 137 MMOL/L (ref 136–145)
SP GR UR STRIP: 1 (ref 1–1.03)
URN SPEC COLLECT METH UR: NORMAL
UROBILINOGEN UR STRIP-ACNC: NEGATIVE EU/DL
WBC # BLD AUTO: 8.58 K/UL (ref 3.9–12.7)

## 2024-08-14 PROCEDURE — 96360 HYDRATION IV INFUSION INIT: CPT

## 2024-08-14 PROCEDURE — 83735 ASSAY OF MAGNESIUM: CPT | Performed by: PHYSICIAN ASSISTANT

## 2024-08-14 PROCEDURE — 99285 EMERGENCY DEPT VISIT HI MDM: CPT | Mod: 25

## 2024-08-14 PROCEDURE — 93010 ELECTROCARDIOGRAM REPORT: CPT | Mod: ,,, | Performed by: INTERNAL MEDICINE

## 2024-08-14 PROCEDURE — 80053 COMPREHEN METABOLIC PANEL: CPT | Performed by: PHYSICIAN ASSISTANT

## 2024-08-14 PROCEDURE — 81003 URINALYSIS AUTO W/O SCOPE: CPT | Performed by: PHYSICIAN ASSISTANT

## 2024-08-14 PROCEDURE — 93005 ELECTROCARDIOGRAM TRACING: CPT

## 2024-08-14 PROCEDURE — 25000003 PHARM REV CODE 250: Performed by: PHYSICIAN ASSISTANT

## 2024-08-14 PROCEDURE — 85025 COMPLETE CBC W/AUTO DIFF WBC: CPT | Performed by: PHYSICIAN ASSISTANT

## 2024-08-14 RX ORDER — ACETAMINOPHEN 500 MG
1000 TABLET ORAL
Status: COMPLETED | OUTPATIENT
Start: 2024-08-14 | End: 2024-08-14

## 2024-08-14 RX ADMIN — ACETAMINOPHEN 1000 MG: 500 TABLET ORAL at 09:08

## 2024-08-14 RX ADMIN — SODIUM CHLORIDE 500 ML: 9 INJECTION, SOLUTION INTRAVENOUS at 09:08

## 2024-08-14 NOTE — ED TRIAGE NOTES
Patient arrives to the ED w/ complaints of falling and hitting her head. Reports taking her morning HTN medication and BP dropping to 90s/50s. States she got dizzy while walking through her house and fell. + LOC for a few minutes per patient's daughter. Reports 7 out of 10 pain to right parietal portion of head. Denies blurred vision and current dizziness. Denies other injury and trauma. Denies numbness and tingling.  Reports taking daily HTN med and ASA PTA. Denies taking medication for pain.

## 2024-08-14 NOTE — ED PROVIDER NOTES
Encounter Date: 8/14/2024       History     Chief Complaint   Patient presents with    Fall     Pt arrives with complaints of falling and hitting her head this morning. States she took her b/p meds this morning and checked her b/p and it was around 99/50s. Reports falling and hitting her head. +LOC. Daily ASA.      69-year-old female presents to ED with concern of head injury after fall that occurred this morning.  Patient reports when she woke up her blood pressure was elevated with systolic 190s.  She reports she took her clonidine on an empty stomach and later began to feel lightheaded upon standing ultimately leading to brief syncopal event with head and right hip injury.  She was able to get back to standing position reports her blood pressure at time was low with systolic 90s.  Her lightheadedness symptoms have since improved.  She does have pain to right side of head and right hip from fall described as sharp and worse with touch with severity 8/10.  No visual changes, numbness or focal weakness, chest pain, palpitations, abdominal pain, nausea vomiting, urinary or bowel complaints.  No other acute complaints at this time    The history is provided by the patient.     Review of patient's allergies indicates:   Allergen Reactions    Etanercept Other (See Comments)     Other reaction(s): recurrent infections    Chloramphenicol sod succinate Hives    Codeine Other (See Comments)     Other reaction(s): Stomach upset. Pt states OK with Percocet    Nickel sutures [surgical stainless steel] Dermatitis     Allergic contact dermatitis    Adhesive Rash     Past Medical History:   Diagnosis Date    Allergy     Amblyopia     Anemia     Anticoagulant long-term use     Arthritis 02/02/1992    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, estrogen receptor positive 04/13/2022    Cataract     Depression     Dry eyes     Dry mouth     Duane's syndrome of right eye     Early dry stage nonexudative age-related  macular degeneration of both eyes 2022    Fibromyalgia 2014    Fibromyalgia     Fractured hip     RIGHT HIP    GERD (gastroesophageal reflux disease)     History of psychiatric hospitalization     Hyperlipidemia 1992    Hypertension     Hypocalcemia     Hyponatremia     Kidney stone     Migraine headache     Osteoporosis     Pressure ulcer of unspecified site, unspecified stage     Psoriatic arthritis 1992    Recurrent upper respiratory infection (URI)     Right knee pain     post knee replacement surgery (possible rejectiion of metal)    RLS (restless legs syndrome)     Schizophrenia 1992    stable on meds    Sciatica     Squamous cell carcinoma of skin     Urinary tract infection      Past Surgical History:   Procedure Laterality Date    brow ptosis repair Right 2019    Surgeon: Dr. Karla Henson    CATARACT EXTRACTION       SECTION      COLONOSCOPY N/A 2016    Procedure: COLONOSCOPY;  Surgeon: ANDRIA Connelly MD;  Location: [address]);  Service: Endoscopy;  Laterality: N/A;    COLONOSCOPY N/A 2022    Procedure: COLONOSCOPY;  Surgeon: Mikey Walters MD;  Location: [facility];  Service: Endoscopy;  Laterality: N/A;    cyst removed from right sinus  1982    EPIDURAL STEROID INJECTION INTO CERVICAL SPINE N/A 2023    Procedure: C6-7 SOPHY;  Surgeon: Spring Jensen MD;  Location: [facility] PAIN MANAGEMENT;  Service: Pain Management;  Laterality: N/A;  20 mins    EPIDURAL STEROID INJECTION INTO LUMBAR SPINE N/A 2023    Procedure: Injection-steroid-epidural-lumbar L5-S1;  Surgeon: Chirag Kim MD;  Location: [facility] PAIN MANAGEMENT;  Service: Pain Management;  Laterality: N/A;  asa    EPIDURAL STEROID INJECTION INTO LUMBAR SPINE N/A 2024    Procedure: L5/S1 Interlaminar SOPHY;  Surgeon: Maile Storm DO;  Location: [facility] PAIN MANAGEMENT;  Service: Pain Management;  Laterality: N/A;  20mins-ASA 5d    ESOPHAGOGASTRODUODENOSCOPY N/A 2023     Procedure: EGD (ESOPHAGOGASTRODUODENOSCOPY);  Surgeon: Dougie Shea MD;  Location: [facility] ENDO;  Service: Endoscopy;  Laterality: N/A;    FOOT FRACTURE SURGERY      HYSTERECTOMY      VAGINAL HYSTERECTOMY WITHOUT BSO - ENDOMETRIOSIS    INJECTION FOR SENTINEL NODE IDENTIFICATION Right 05/09/2022    Procedure: INJECTION, FOR SENTINEL NODE IDENTIFICATION-Right;  Surgeon: NEAL Dhillon MD;  Location: [facility] OR;  Service: General;  Laterality: Right;    INJECTION OF ANESTHETIC AGENT AROUND MEDIAL BRANCH NERVES INNERVATING LUMBAR FACET JOINT N/A 04/11/2019    Procedure: Lumbo-sacral Block, DR5 and Lateral Branches of S1,S2, S3;  Surgeon: Michelle Pineda Jr., MD;  Location: [facility] PAIN [facility] [place] [place];  Service: Pain Management;  Laterality: N/A;  Pt takes  and states she holds ASA on her own whenever she has procedures.  Instructed to hold x 3 days prior to procedure.      INJECTION OF ANESTHETIC AGENT AROUND MEDIAL BRANCH NERVES INNERVATING LUMBAR FACET JOINT Bilateral 05/03/2023    Procedure: Block-nerve-medial branch-lumbar bilateral L3, L4, L5;  Surgeon: Maile Storm DO;  Location: [facility] PAIN [facility] [place] [place];  Service: Pain Management;  Laterality: Bilateral;  asa    INJECTION OF ANESTHETIC AGENT INTO SACROILIAC JOINT Right 08/30/2018    Procedure: BLOCK, SACROILIAC JOINT-Right- ORAL SEDATION;  Surgeon: Michelle Pineda Jr., MD;  Location: [facility] PAIN [facility] [place] [place];  Service: Pain Management;  Laterality: Right;    INJECTION OF ANESTHETIC AGENT INTO SACROILIAC JOINT Bilateral 09/27/2018    Procedure: BLOCK, SACROILIAC JOINT-BILATERAL;  Surgeon: Michelle Pineda Jr., MD;  Location: [facility] PAIN [facility] [place] [place];  Service: Pain Management;  Laterality: Bilateral;  Please keep at 10:00 due to trasnsportation    INJECTION OF ANESTHETIC AGENT INTO SACROILIAC JOINT Bilateral 02/07/2019    Procedure: Bilateral Sacroiliac Joint Injection - Per Dr Pineda, not necessary to hold ASA.;  Surgeon: Michelle Pineda Jr., MD;  Location: [facility] PAIN [facility] [place] [place];  Service:  Pain Management;  Laterality: Bilateral;    INJECTION, SPINE, LUMBOSACRAL, TRANSFORAMINAL APPROACH Right 7/17/2024    Procedure: Right  L3/4  and L4/5 TFESI;  Surgeon: Maile Storm DO;  Location: [facility] PAIN MANAGEMENT;  Service: Pain Management;  Laterality: Right;  20 mins  ASA 5 days    INTRAOCULAR PROSTHESES INSERTION Right 08/01/2019    Procedure: INSERTION, IOL PROSTHESIS;  Surgeon: Karen Song MD;  Location: [facility] OR [address] [place] [address];  Service: Ophthalmology;  Laterality: Right;    INTRAOCULAR PROSTHESES INSERTION Left 09/26/2019    Procedure: INSERTION, IOL PROSTHESIS;  Surgeon: Karen Song MD;  Location: [facility] OR [address] [place] [address];  Service: Ophthalmology;  Laterality: Left;    JOINT REPLACEMENT Right     knee    KNEE SURGERY      MASTECTOMY Right 05/09/2022    Procedure: MASTECTOMY-Right;  Surgeon: NEAL Dhillon MD;  Location: [facility];  Service: General;  Laterality: Right;  2.5 HOURS    PHACOEMULSIFICATION OF CATARACT Right 08/01/2019    Procedure: PHACOEMULSIFICATION, CATARACT;  Surgeon: Karen Song MD;  Location: [address];  Service: Ophthalmology;  Laterality: Right;    PHACOEMULSIFICATION OF CATARACT Left 09/26/2019    Procedure: PHACOEMULSIFICATION, CATARACT;  Surgeon: Karen Song MD;  Location: [facility] OR [address] [place] [address];  Service: Ophthalmology;  Laterality: Left;    RADIOFREQUENCY ABLATION, NERVE, PERIPHERAL Right 04/24/2024    Procedure: RIGHT L5 Dorsal Ramus and RIGHT Lateral Branches of S1, S2, and S3;  Surgeon: Maile Storm DO;  Location: [facility] PAIN MANAGEMENT;  Service: Pain Management;  Laterality: Right;  30 mins    RADIOFREQUENCY THERMOCOAGULATION Right 05/07/2019    Procedure: RADIOFREQUENCY THERMAL COAGULATION RIGHT DORSAL RAMUS 5 AND LATERAL BRANCH OF S1, S2 AND S3;  Surgeon: Michelle Pineda Jr., MD;  Location: [facility] PAIN T;  Service: Pain Management;  Laterality: Right;    RADIOFREQUENCY THERMOCOAGULATION Left 05/14/2019    Procedure: RADIOFREQUENCY THERMAL COAGULATION  LEFT DORSAL RAMUS 5 AND LATERAL BRANCH OF S1,S2 AND S3;  Surgeon: Michelle Pineda Jr., MD;  Location: [facility];  Service: Pain Management;  Laterality: Left;    RADIOFREQUENCY THERMOCOAGULATION Right 10/22/2019    Procedure: RADIOFREQUENCY THERMAL COAGULATION---DARSAL RAMUS 5 and LATERAL S1,S2,and S3 Right;  Surgeon: Michelle Pineda Jr., MD;  Location: [facility];  Service: Pain Management;  Laterality: Right;  patient to sign consent DOS    RADIOFREQUENCY THERMOCOAGULATION Left 10/29/2019    Procedure: RADIOFREQUENCY THERMAL COAGULATION - LEFT - DR5, S1,S2, AND S3;  Surgeon: Michelle Pineda Jr., MD;  Location: [facility];  Service: Pain Management;  Laterality: Left;    RADIOFREQUENCY THERMOCOAGULATION Left 05/26/2020    Procedure: RADIOFREQUENCY THERMAL COAGULATION--Left DR5+ lateral branches of S1, S2, S3;  Surgeon: Michelle Pineda Jr., MD;  Location: [facility];  Service: Pain Management;  Laterality: Left;    RADIOFREQUENCY THERMOCOAGULATION Right 06/02/2020    Procedure: RADIOFREQUENCY THERMAL COAGULATION--Right DR5+ lateral branches of S1, S2, S3;  Surgeon: Michelle Pineda Jr., MD;  Location: [facility];  Service: Pain Management;  Laterality: Right;    SENTINEL LYMPH NODE BIOPSY Right 05/09/2022    Procedure: BIOPSY, LYMPH NODE, SENTINEL-Right;  Surgeon: NEAL Dhillon MD;  Location: [facility];  Service: General;  Laterality: Right;    SINUS SURGERY      Surgery on right knee  07/09/1982    TONSILLECTOMY      tumor removed from back left side upper shoulder  03/17/2006     Family History   Problem Relation Name Age of Onset    Asthma Mother      Colon cancer Mother      Psoriasis Mother      Cancer Mother          colon    Depression Mother      Cancer Father          lymphoma    Stroke Sister      Diabetes Brother x2     Arthritis Brother x2     Heart disease Brother x2         cad    No Known Problems Daughter      Hypertension Neg Hx      Suicide Neg Hx      Amblyopia Neg Hx       Blindness Neg Hx      Cataracts Neg Hx      Glaucoma Neg Hx      Macular degeneration Neg Hx      Retinal detachment Neg Hx      Strabismus Neg Hx      Eczema Neg Hx      Allergies Neg Hx       Social History     Tobacco Use    Smoking status: Every Day     Current packs/day: 0.00     Average packs/day: 0.3 packs/day for 10.0 years (2.5 ttl pk-yrs)     Types: Cigarettes     Start date: 1/10/2013     Last attempt to quit: 1/10/2023     Years since quittin.5     Passive exposure: Never    Smokeless tobacco: Former    Tobacco comments:     about 5 cig a day   Substance Use Topics    Alcohol use: No    Drug use: No     Review of Systems   Eyes:  Negative for visual disturbance.   Cardiovascular:  Negative for chest pain and palpitations.   Gastrointestinal:  Negative for abdominal pain, nausea and vomiting.   Genitourinary:  Negative for dysuria.   Musculoskeletal:  Positive for neck pain. Negative for neck stiffness.   Skin:  Negative for wound.   Neurological:  Positive for syncope, light-headedness and headaches. Negative for weakness and numbness.       Physical Exam     Initial Vitals [24 0805]   BP Pulse Resp Temp SpO2   (!) 93/51 70 18 97.4 °F (36.3 °C) (!) 94 %      MAP       --         Physical Exam    Nursing note and vitals reviewed.  Constitutional: She appears well-developed and well-nourished. She is cooperative. She does not have a sickly appearance. She does not appear ill. No distress.   HENT:   Head: Normocephalic. Head is without raccoon's eyes and without Velarde's sign.   Contusion to right parietal scalp with small localized hematoma.  No skin changes or wounds.   Eyes: EOM are normal.   Neck: Neck supple.   Left-sided cervical paraspinal tenderness with no midline bony tenderness.  Appropriate sensation and strength of bilateral upper extremities   Normal range of motion.  Cardiovascular:  Normal rate, regular rhythm and normal heart sounds.           Pulmonary/Chest: Effort normal and  breath sounds normal.   Abdominal: Abdomen is soft.   Musculoskeletal:         General: No tenderness.      Cervical back: Normal range of motion and neck supple.      Comments: Right hip tenderness over lateral aspect with no obvious physical or palpable deformities.  No pain from internal/external rotation of right hip.  No lower extremity deformities.  BLE sensations intact.  Patient able to ambulate.     Neurological: She is alert and oriented to person, place, and time. She is not disoriented. GCS eye subscore is 4. GCS verbal subscore is 5. GCS motor subscore is 6.   Skin: Skin is warm and dry.   Psychiatric: She has a normal mood and affect. Her speech is normal and behavior is normal. Thought content normal.         ED Course   Procedures  Labs Reviewed   CBC W/ AUTO DIFFERENTIAL - Abnormal       Result Value    WBC 8.58      RBC 3.81 (*)     Hemoglobin 12.6      Hematocrit 37.2      MCV 98      MCH 33.1 (*)     MCHC 33.9      RDW 14.7 (*)     Platelets 259      MPV 8.5 (*)     Immature Granulocytes 0.2      Gran # (ANC) 6.3      Immature Grans (Abs) 0.02      Lymph # 1.4      Mono # 0.6      Eos # 0.2      Baso # 0.04      nRBC 0      Gran % 73.6 (*)     Lymph % 16.6 (*)     Mono % 6.9      Eosinophil % 2.2      Basophil % 0.5      Differential Method Automated     COMPREHENSIVE METABOLIC PANEL - Abnormal    Sodium 137      Potassium 4.0      Chloride 105      CO2 25      Glucose 110      BUN 8      Creatinine 0.8      Calcium 9.3      Total Protein 5.8 (*)     Albumin 3.6      Total Bilirubin 0.4      Alkaline Phosphatase 56      AST 19      ALT 11      eGFR >60      Anion Gap 7 (*)    MAGNESIUM    Magnesium 1.7     URINALYSIS, REFLEX TO URINE CULTURE    Specimen UA Urine, Clean Catch      Color, UA Yellow      Appearance, UA Clear      pH, UA 7.0      Specific Gravity, UA 1.005      Protein, UA Negative      Glucose, UA Negative      Ketones, UA Negative      Bilirubin (UA) Negative      Occult Blood UA  Negative      Nitrite, UA Negative      Urobilinogen, UA Negative      Leukocytes, UA Negative      Narrative:     Specimen Source->Urine          Imaging Results               CT Cervical Spine Without Contrast (Final result)  Result time 08/14/24 10:21:09      Final result by Tone Winters MD (08/14/24 10:21:09)                   Impression:      No evidence of acute intracranial  pathology.    Multilevel cervical spondylosis.  Osseous reaction identified level of the LEFT C7 lamina may represent that of age-indeterminate healing fracture and/or an underlying osseous lesion.  MRI (preferably with contrast) recommended for further evaluation.    This report was flagged in Epic as abnormal.      Electronically signed by: Tone Winters MD  Date:    08/14/2024  Time:    10:21               Narrative:    EXAMINATION:  CT HEAD WITHOUT CONTRAST; CT CERVICAL SPINE WITHOUT CONTRAST    CLINICAL HISTORY:  Head trauma, minor (Age >= 65y);; Neck trauma (Age >= 65y);    TECHNIQUE:  Low dose axial CT images obtained throughout the head without the use of intravenous contrast.  Axial, sagittal and coronal reconstructions were performed.    Low dose axial images, sagittal and coronal reformations were performed though the cervical spine.  Contrast was not administered.    COMPARISON:  08/22/2020 and 10/19/2018    FINDINGS:  INTRACRANIAL COMPARTMENT:    Ventricles and sulci are normal in size for age without evidence of hydrocephalus.    The brain parenchyma appears within normal limits for age.  No parenchymal mass, hemorrhage, edema or major vascular distribution infarct.    No extra-axial blood or fluid collections.    SKULL/EXTRACRANIAL CONTENTS (limited evaluation):    No fracture. Mastoid air cells and paranasal sinuses are essentially clear.    Skull Base and Craniocervical Junction (partially imaged): Normal.    CERVICAL SPINE:    Spinal Alignment: Scoliosis..    Vertebrae: No fracture.    Discs: Normal  height.    Degenerative findings: Cervical spondylosis manifest by disc space narrowing C3-C4 and C5-C6 with approximately 2 mm anterolisthesis of C4 on C5.  Less than 2 mm anterolisthesis C6 on C7.  Narrowing of the central canal dimensions most evident C5-C6.  At the C7 lamina and posterior spinous process, lateralizing LEFT, is prominent osseous reaction.  While this could represent that of callus relating to healing fracture age indeterminate, an underlying osseous lesion is not excluded.  MRI recommended for further evaluation.  There is mass effect upon the spinal canal dimensions at this level    Paraspinal muscles & soft tissues: Normal.  Atherosclerosis within the carotid vasculature.                                        CT Head Without Contrast (Final result)  Result time 08/14/24 10:21:09      Final result by Tone Winters MD (08/14/24 10:21:09)                   Impression:      No evidence of acute intracranial  pathology.    Multilevel cervical spondylosis.  Osseous reaction identified level of the LEFT C7 lamina may represent that of age-indeterminate healing fracture and/or an underlying osseous lesion.  MRI (preferably with contrast) recommended for further evaluation.    This report was flagged in Epic as abnormal.      Electronically signed by: Tone Winters MD  Date:    08/14/2024  Time:    10:21               Narrative:    EXAMINATION:  CT HEAD WITHOUT CONTRAST; CT CERVICAL SPINE WITHOUT CONTRAST    CLINICAL HISTORY:  Head trauma, minor (Age >= 65y);; Neck trauma (Age >= 65y);    TECHNIQUE:  Low dose axial CT images obtained throughout the head without the use of intravenous contrast.  Axial, sagittal and coronal reconstructions were performed.    Low dose axial images, sagittal and coronal reformations were performed though the cervical spine.  Contrast was not administered.    COMPARISON:  08/22/2020 and 10/19/2018    FINDINGS:  INTRACRANIAL COMPARTMENT:    Ventricles and sulci are  normal in size for age without evidence of hydrocephalus.    The brain parenchyma appears within normal limits for age.  No parenchymal mass, hemorrhage, edema or major vascular distribution infarct.    No extra-axial blood or fluid collections.    SKULL/EXTRACRANIAL CONTENTS (limited evaluation):    No fracture. Mastoid air cells and paranasal sinuses are essentially clear.    Skull Base and Craniocervical Junction (partially imaged): Normal.    CERVICAL SPINE:    Spinal Alignment: Scoliosis..    Vertebrae: No fracture.    Discs: Normal height.    Degenerative findings: Cervical spondylosis manifest by disc space narrowing C3-C4 and C5-C6 with approximately 2 mm anterolisthesis of C4 on C5.  Less than 2 mm anterolisthesis C6 on C7.  Narrowing of the central canal dimensions most evident C5-C6.  At the C7 lamina and posterior spinous process, lateralizing LEFT, is prominent osseous reaction.  While this could represent that of callus relating to healing fracture age indeterminate, an underlying osseous lesion is not excluded.  MRI recommended for further evaluation.  There is mass effect upon the spinal canal dimensions at this level    Paraspinal muscles & soft tissues: Normal.  Atherosclerosis within the carotid vasculature.                                       X-Ray Hip 2 or 3 views Right with Pelvis when performed (Final result)  Result time 08/14/24 09:51:14      Final result by Tone Winters MD (08/14/24 09:51:14)                   Impression:      No evidence of fracture.No interval detrimental change      Electronically signed by: Tone Winters MD  Date:    08/14/2024  Time:    09:51               Narrative:    EXAMINATION:  XR HIP WITH PELVIS WHEN PERFORMED 2 OR 3 VIEWS RIGHT    CLINICAL HISTORY:  fall;    COMPARISON:  None.    FINDINGS:  The alignment is within normal limits.  No displaced fractures identified.  No evidence of lytic or blastic lesions.Degenerative changes both hip joints,  unchanged from 2022 tissues are unremarkable.  Osteitis pubis.  Vascular calcifications.                                       Medications   sodium chloride 0.9% bolus 500 mL 500 mL (0 mLs Intravenous Stopped 8/14/24 1010)   acetaminophen tablet 1,000 mg (1,000 mg Oral Given 8/14/24 0909)     Medical Decision Making  Patient presents to ED after syncopal event that occurred shortly after taking her blood pressure medicine on empty stomach this morning resulting in head, neck and right hip injury.  Afebrile on arrival with arrival BP 93/51.  Patient in no distress on exam    DDx:  Including but not limited to Scalp contusion, concussion, laceration, skull fracture,diffuse axonal injury, countercoup injury, intracranial hemorrhage such as subdural hematoma, subarachnoid hemorrhage or epidural hematoma, cerebral contusion, soft tissue injury, paraspinal or spinal injury, medication side-effect, dehydration, orthostatic, EDWARDO, electrolyte abnormality    Amount and/or Complexity of Data Reviewed  Labs: ordered. Decision-making details documented in ED Course.  Radiology: ordered. Decision-making details documented in ED Course.    Risk  OTC drugs.               ED Course as of 08/14/24 1120   Wed Aug 14, 2024   0907 CBC auto differential(!)  Grossly unremarkable.  No elevation WBC [KS]   0913 Comprehensive metabolic panel(!)  Unremarkable.  No significant electrolyte abnormality [KS]   0913 Magnesium  WNL [KS]   0954 X-Ray Hip 2 or 3 views Right with Pelvis when performed  No acute fracture visualized per my interpretation and Radiology review [KS]   1057 Urinalysis, Reflex to Urine Culture Urine, Clean Catch  Unremarkable [KS]   1058 CT Head Without Contrast(!)  No acute intracranial findings per Radiology review [KS]   1058 CT Cervical Spine Without Contrast(!)  Per Radiology review:  Degenerative findings: Cervical spondylosis manifest by disc space narrowing C3-C4 and C5-C6 with approximately 2 mm anterolisthesis of  C4 on C5.  Less than 2 mm anterolisthesis C6 on C7.  Narrowing of the central canal dimensions most evident C5-C6.  At the C7 lamina and posterior spinous process, lateralizing LEFT, is prominent osseous reaction.  While this could represent that of callus relating to healing fracture age indeterminate, an underlying osseous lesion is not excluded.  MRI recommended for further evaluation.  There is mass effect upon the spinal canal dimensions at this level [KS]   1117 Patient was briefly discussed with neurosurgeon, Dr. Tolbert, via secure chat.  He states he has ordered outpatient MRI and will schedule outpatient follow-up.  No need for cervical bracing at this time. [KS]   1117 Results were discussed with patient, who continues remained well-appearing in ED.  She does report improvement of her symptoms.  BP has normalized.  Stable for discharge with close outpatient follow-up.  ED return precautions were discussed.  Patient states her understanding and agrees with plan. [KS]   1118 Patient discussed with attending, Dr. Alva, who agrees with ED course and dispo [KS]      ED Course User Index  [KS] Dimas Sethi PA-C                             Clinical Impression:  Final diagnoses:  [R55] Syncope  [S00.03XA] Contusion of scalp, initial encounter (Primary)  [S16.1XXA] Cervical strain, acute, initial encounter  [W19.XXXA] Fall, initial encounter          ED Disposition Condition    Discharge Stable          ED Prescriptions    None       Follow-up Information       Follow up With Specialties Details Why Contact Info    Segundo Tolbert MD Neurosurgery   [address] [facility] [address] [facility]  [address]  [facility] [address]  [phone]               Dimas Sethi PA-C  08/14/24 3750

## 2024-08-14 NOTE — ED NOTES
Limb alert band placed on patient's right arm.  Patient sitting up in bed comfortably. Daughter at bedside.

## 2024-08-15 ENCOUNTER — CLINICAL SUPPORT (OUTPATIENT)
Dept: REHABILITATION | Facility: HOSPITAL | Age: 69
End: 2024-08-15
Payer: MEDICARE

## 2024-08-15 ENCOUNTER — PATIENT OUTREACH (OUTPATIENT)
Dept: EMERGENCY MEDICINE | Facility: HOSPITAL | Age: 69
End: 2024-08-15
Payer: MEDICARE

## 2024-08-15 DIAGNOSIS — Z74.09 IMPAIRED FUNCTIONAL MOBILITY, BALANCE, GAIT, AND ENDURANCE: Primary | ICD-10-CM

## 2024-08-15 DIAGNOSIS — R53.1 GENERALIZED WEAKNESS: ICD-10-CM

## 2024-08-15 LAB
OHS QRS DURATION: 78 MS
OHS QTC CALCULATION: 420 MS

## 2024-08-15 PROCEDURE — 97530 THERAPEUTIC ACTIVITIES: CPT | Mod: KX,PN

## 2024-08-15 NOTE — PROGRESS NOTES
[facility]   Physical Therapy Treatment Note      Name: Bhavna Landry  [facility] Number: [id number]    Therapy Diagnosis:   Encounter Diagnoses   Name Primary?    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, initial encounter   M54.10 (ICD-10-CM) - Radicular leg pain   M54.50,G89.29 (ICD-10-CM) - Chronic left-sided low back pain without sciatica      Evaluation Date: 6/26/2024  Authorization Period Expiration: 12/31/2024  Plan of Care Expiration: 8/7/2024; 9/18/2024  Progress Note Due: 7/18/2024  Date of Surgery: none   Visit # / Visits authorized: 8/ 20   FOTO: MET GOAL     Precautions: Standard and hx of cancer     Time In: 1:03 pm   Time Out: 1:55 pm   Total Billable Time: 30 minutes - 1:1 physical therapist    PTA Visit #: 0/5       Subjective     Patient reports: she passed out yesterday from blood pressure being too low. But day prior blood pressure was too high. Feels like she has improved by about 50% since starting plan of care. No longer has to use the walker and she's not shuffling, but still thinks she could improve more.     She was not compliant with home exercise program.  Response to previous treatment: improved pain levels  Functional change: improved ability to ambulate    Pain: 4/10 beginning; 1/10 end of sesssion  Location: right buttocks, SIJ, and low back     Objective      Objective Measures updated at progress report unless specified.     8/15/2024  AROM: Seated    Degrees Pain/Dysfunction   Flexion Finger tips to toes  pulling in right buttocks  Staples's Sign: -   Extension 10 NP   Right Side Bending  50% NP   Left Side Bending  50% NP   Right Rotation 75%% NP   Left Rotation 75% NP      Strength:  RLE   LLE      Hip flexion: 4+/5 Hip flexion: 4+/5   Hip Abduction: 4/5 Hip abduction: 4/5   Hip adduction: 4/5 Hip adduction: 4/5   Hip ER 4-/5 Hip ER 4-/5   Hip IR 4-/5 Hip IR 4/5   Knee flexion: 4/5 Knee flexion: 4+/5   Knee extension: 5/5 Knee extension: 5/5   Ankle Dorsiflexion: 4-/5 Ankle Dorsiflexion: 4+/5   Ankle Plantarflexion: 4+/5 Ankle Plantarflexion: 5/5     TUG: 10.43 seconds   30 second sit to stand: 13x without upper extremity support     Treatment     Bhavna received the treatments listed below:      therapeutic activities to improve functional performance for 52 minutes, including:  Objective test and measures   FOTO  Outcome measures  Provided updated home exercise program   Discussion regarding plan of care, expectations, prognosis     Patient Education and Home Exercises       Education provided:   - compliance with HEP    Written Home Exercises Provided: yes. Exercises were reviewed and Bhavna was able to demonstrate them prior to the end of the session.  Bhavna demonstrated good  understanding of the education provided. See Electronic Medical Record under Patient Instructions for exercises provided during therapy sessions    Assessment     Bhavna is a 68 y.o. 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 Patient presents with signs and symptoms of chronic low back pain. Patient presents with about 50% improvement in low back pain since beginning plan of care. No longer ambulating with rolling walker and able to tolerate longer distance ambulation/standing. Continues to suffer from morning stiffness and pain upon rising from bed. Has responded great to prone lying and press ups for low back relief. Remains weak in core stabilization and gluteal strength areas. Hip  flexors remain tight, limiting appropriate hip extension during stance phase of ambulation.     Bhavna Is progressing well towards her goals.   Patient prognosis is Fair.     Patient will continue to benefit from skilled outpatient physical therapy to address the deficits listed in the problem list box on initial evaluation, provide pt/family education and to maximize pt's level of independence in the home and community environment.     Patient's spiritual, cultural and educational needs considered and pt agreeable to plan of care and goals.     Anticipated barriers to physical therapy: chronic re-occurrences; high intensity pain levels     Goals:   Short Term Goals (3 Weeks):  1. Patient will be compliant with home exercise program to supplement therapy in promoting functional mobility. progressing, not met   2. Patient will perform transverse abdominus activation with good control to demonstrate improved core strength. MET 8/15/2024  3. Patient will report no pain during bed mobility transitions to promote functional mobility. MET 8/15/2024  4. Patient will improve impaired lower extremity myotomes/manual muscle tests  to >/=4/5 to improve strength for functional tasks. progressing, not met      Long Term Goals (6 Weeks):   1. Patient will improve FOTO score to </= 47% limited to decrease perceived limitation with maintaining/changing body position. MET 8/15/2024  2. Patient will be able to sleep for >/= 3 nights without waking from pain to improve quality of life. progressing, not met   3. Patient will improve impaired lower extremity myotomes/manual muscle tests to >/=4+/5 to improve strength for functional tasks. progressing , not met   4. Patient will be able to complete 6 MWT to improve aerobic tolerance and functional lower extremity strength and endurance. progressing, not met     Plan     see updated home exercise program     Stephanie Castellanos, PT

## 2024-08-15 NOTE — PROGRESS NOTES
Patient was seen in the ED on 8/14/24 for a fall. Patient was contacted for post ED discharge navigation. They have an appointment scheduled with TORIE Rahman on 8/26/24 at 10:30. ED navigator will remind patient of appointment.

## 2024-08-16 ENCOUNTER — TELEPHONE (OUTPATIENT)
Dept: INTERNAL MEDICINE | Facility: CLINIC | Age: 69
End: 2024-08-16
Payer: MEDICARE

## 2024-08-16 NOTE — TELEPHONE ENCOUNTER
----- Message from Darlineshadi Bernal sent at 8/16/2024 10:16 AM CDT -----  Contact: [phone]  1MEDICALADVICE     Patient is calling for Medical Advice regarding: Pt said she needs a call back about her BP and the meds cause it has been out of controls she said     How long has patient had these symptoms: 2 week     Pharmacy name and phone#:  [id number] - [place] - [address] AT [facility] [place]  [address] [place] [address]  Phone: [phone] Fax: [phone]      Patient wants a call back or thru [facility]: call back     Comments:    Please advise patient replies from provider may take up to 48 hours.

## 2024-08-16 NOTE — TELEPHONE ENCOUNTER
----- Message from Marian Morin sent at 8/16/2024  3:25 PM CDT -----  Contact: [phone]  1MEDICALADVICE     Patient is calling for Medical Advice regarding: patient has a virtual appt on 08/22/2024 and would like to know If she can get a virtual appt on  Wednesday or Thursday in the morning ( 08/23/2024)    How long has patient had these symptoms: n/a    Pharmacy name and phone#:n/a    Patient wants a call back or thru [facility]: call back     Comments:    Please advise patient replies from provider may take up to 48 hours.

## 2024-08-16 NOTE — TELEPHONE ENCOUNTER
Returned patient call states her BP has been dropping lately. States it was 145/68 at 2:30 pm today. Scheduled patient virtual appt

## 2024-08-19 ENCOUNTER — CLINICAL SUPPORT (OUTPATIENT)
Dept: REHABILITATION | Facility: HOSPITAL | Age: 69
End: 2024-08-19
Payer: MEDICARE

## 2024-08-19 DIAGNOSIS — Z74.09 IMPAIRED FUNCTIONAL MOBILITY, BALANCE, GAIT, AND ENDURANCE: Primary | ICD-10-CM

## 2024-08-19 DIAGNOSIS — R53.1 GENERALIZED WEAKNESS: ICD-10-CM

## 2024-08-19 PROCEDURE — 97112 NEUROMUSCULAR REEDUCATION: CPT | Mod: PN

## 2024-08-19 NOTE — PROGRESS NOTES
"[facility]   Physical Therapy Treatment Note      Name: Bhavna Landry  Clinic Number: [id number]    Therapy Diagnosis:   Encounter Diagnoses   Name Primary?    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, initial encounter   M54.10 (ICD-10-CM) - Radicular leg pain   M54.50,G89.29 (ICD-10-CM) - Chronic left-sided low back pain without sciatica      Evaluation Date: 6/26/2024  Authorization Period Expiration: 12/31/2024  Plan of Care Expiration: 8/7/2024; 9/18/2024  Progress Note Due: 7/18/2024  Date of Surgery: none   Visit # / Visits authorized: 9/20   FOTO: MET GOAL     Precautions: Standard and hx of cancer     Time In: 3:05 pm   Time Out: 4:00 pm   Total Billable Time: 30 minutes - 1:1 physical therapist    PTA Visit #: 0/5       Subjective     Patient reports: she passed out yesterday from blood pressure being too low. But day prior blood pressure was too high. Feels like she has improved by about 50% since starting plan of care. No longer has to use the walker and she's not shuffling, but still thinks she could improve more.     She was not compliant with home exercise program.  Response to previous treatment: improved pain levels  Functional change: improved ability to ambulate    Pain: 4/10 beginning; 1/10 end of sesssion  Location: right buttocks, SIJ, and low back     Objective      See 8/15/2024 note     Treatment     Bhavna received the treatments listed below:      therapeutic exercises to develop strength, endurance, ROM, flexibility, and core stabilization for 10 minutes including:  Nu step: level 4 for 8 minutes for sciatic neural flossing.  Seated hamstring stretch: 3x30" each      neuromuscular re-education " "activities to improve: Coordination, Kinesthetic, Sense, and Proprioception for 45 minutes. The following activities were included:  Pillow under hips:  Prone lyin minute   Prone on elbows: 1 minute. 2 rounds   Prone press ups on elbows: 2x10  Prone hip flexor/quad stretch: 3x30" with strap  Prone Glute sets: 10x10"  Prone hip extension: 2x10    Patient Education and Home Exercises       Education provided:   - compliance with HEP    Written Home Exercises Provided: yes. Exercises were reviewed and Bhavna was able to demonstrate them prior to the end of the session.  Bhavna demonstrated good  understanding of the education provided. See Electronic Medical Record under Patient Instructions for exercises provided during therapy sessions    Assessment     Bhavna is a 68 y.o. 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 Patient presents with signs and symptoms of chronic low back pain. Patient presents with about 50% improvement in low back pain since beginning plan of care. No longer ambulating with rolling walker and able to tolerate longer distance ambulation/standing. Continues to suffer from morning stiffness and pain upon rising from bed. Has responded great to prone lying and press ups for low back relief. Remains weak in core stabilization and gluteal strength areas. Continuing with similar plan of care focusing on posterior chain strength and promoting lumbar extension.     Bhavna Is progressing well towards her goals.   Patient prognosis is Fair.     Patient will continue to benefit from skilled outpatient physical therapy to address the deficits listed in the problem list box on initial evaluation, provide pt/family education and to maximize pt's level of " independence in the home and community environment.     Patient's spiritual, cultural and educational needs considered and pt agreeable to plan of care and goals.     Anticipated barriers to physical therapy: chronic re-occurrences; high intensity pain levels     Goals:   Short Term Goals (3 Weeks):  1. Patient will be compliant with home exercise program to supplement therapy in promoting functional mobility. progressing, not met   2. Patient will perform transverse abdominus activation with good control to demonstrate improved core strength. MET 8/15/2024  3. Patient will report no pain during bed mobility transitions to promote functional mobility. MET 8/15/2024  4. Patient will improve impaired lower extremity myotomes/manual muscle tests  to >/=4/5 to improve strength for functional tasks. progressing, not met      Long Term Goals (6 Weeks):   1. Patient will improve FOTO score to </= 47% limited to decrease perceived limitation with maintaining/changing body position. MET 8/15/2024  2. Patient will be able to sleep for >/= 3 nights without waking from pain to improve quality of life. progressing, not met   3. Patient will improve impaired lower extremity myotomes/manual muscle tests to >/=4+/5 to improve strength for functional tasks. progressing , not met   4. Patient will be able to complete 6 MWT to improve aerobic tolerance and functional lower extremity strength and endurance. progressing, not met     Plan     see updated home exercise program     Matthias Smallwood, PT

## 2024-08-19 NOTE — PLAN OF CARE
[facility]  Physical Therapy Plan of Care Note     Name: Bhavna Landry  Clinic Number: [id number]    Therapy Diagnosis:   Encounter Diagnoses   Name Primary?    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 But day prior blood pressure was too high. Feels like she has improved by about 50% since starting plan of care. No longer has to use the walker and she's not shuffling, but still thinks she could improve more.        OBJECTIVE     Update:     8/15/2024  AROM: Seated    Degrees Pain/Dysfunction   Flexion Finger tips to toes  pulling in right buttocks  Emerita's Sign: -   Extension 10 NP   Right Side Bending  50% NP   Left Side Bending  50% NP   Right Rotation 75%% NP   Left Rotation 75% NP      Strength:  RLE   LLE     Hip flexion: 4+/5 Hip flexion: 4+/5   Hip Abduction: 4/5 Hip abduction: 4/5   Hip adduction: 4/5 Hip adduction: 4/5   Hip ER 4-/5 Hip ER 4-/5   Hip IR 4-/5 Hip IR 4/5   Knee flexion: 4/5 Knee flexion: 4+/5   Knee extension: 5/5 Knee extension: 5/5   Ankle Dorsiflexion: 4-/5 Ankle Dorsiflexion: 4+/5   Ankle Plantarflexion: 4+/5 Ankle  Plantarflexion: 5/5      TUG: 10.43 seconds   30 second sit to stand: 13x without upper extremity support     ASSESSMENT     Update:   Bhavna is a 68 y.o. 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 Patient presents with signs and symptoms of chronic low back pain. Patient presents with about 50% improvement in low back pain since beginning plan of care. No longer ambulating with rolling walker and able to tolerate longer distance ambulation/standing. Continues to suffer from morning stiffness and pain upon rising from bed. Has responded great to prone lying and press ups for low back relief. Remains weak in core stabilization and gluteal strength areas. Hip flexors remain tight, limiting appropriate hip extension during stance phase of ambulation.        Previous Short Term Goals Status:   progressing, not met   New Short Term Goals Status:   partially met.  Long Term Goal Status: continue per initial plan of care.  Reasons for Recertification of Therapy:   see assessment     GOALS  Short Term Goals (3 Weeks):  1. Patient will be compliant with home exercise program to supplement therapy in promoting functional mobility. progressing, not met   2. Patient will perform transverse abdominus activation with good control to demonstrate improved core strength. MET 8/15/2024  3. Patient will report no pain during bed mobility transitions to promote functional mobility. MET 8/15/2024  4. Patient will improve impaired lower extremity myotomes/manual muscle tests  to >/=4/5 to improve strength for functional tasks. progressing, not met      Long Term Goals (6 Weeks):   1. Patient will improve FOTO score to </= 47% limited to decrease perceived limitation with  maintaining/changing body position. MET 8/15/2024  2. Patient will be able to sleep for >/= 3 nights without waking from pain to improve quality of life. progressing, not met   3. Patient will improve impaired lower extremity myotomes/manual muscle tests to >/=4+/5 to improve strength for functional tasks. progressing , not met   4. Patient will be able to complete 6 MWT to improve aerobic tolerance and functional lower extremity strength and endurance. progressing, not met     PLAN     Updated Certification Period: 8/15/2023 to 9/18/2024   Recommended Treatment Plan: 1-2 times per week for 4-6 weeks:  Gait Training, Manual Therapy, Moist Heat/ Ice, Neuromuscular Re-ed, Patient Education, Self Care, Therapeutic Activities, and Therapeutic Exercise  Other Recommendations: compliance with home exercise program     Stephanie Castellanos, PT

## 2024-08-22 ENCOUNTER — OFFICE VISIT (OUTPATIENT)
Dept: INTERNAL MEDICINE | Facility: CLINIC | Age: 69
End: 2024-08-22
Payer: MEDICARE

## 2024-08-22 DIAGNOSIS — I10 ESSENTIAL HYPERTENSION: Primary | ICD-10-CM

## 2024-08-22 DIAGNOSIS — R55 SYNCOPE, UNSPECIFIED SYNCOPE TYPE: ICD-10-CM

## 2024-08-22 PROCEDURE — 99214 OFFICE O/P EST MOD 30 MIN: CPT | Mod: 95,,, | Performed by: INTERNAL MEDICINE

## 2024-08-22 PROCEDURE — 4010F ACE/ARB THERAPY RXD/TAKEN: CPT | Mod: CPTII,95,, | Performed by: INTERNAL MEDICINE

## 2024-08-22 PROCEDURE — 1160F RVW MEDS BY RX/DR IN RCRD: CPT | Mod: CPTII,95,, | Performed by: INTERNAL MEDICINE

## 2024-08-22 PROCEDURE — 1159F MED LIST DOCD IN RCRD: CPT | Mod: CPTII,95,, | Performed by: INTERNAL MEDICINE

## 2024-08-22 RX ORDER — VALSARTAN 160 MG/1
160 TABLET ORAL DAILY
Qty: 90 TABLET | Refills: 0 | Status: SHIPPED | OUTPATIENT
Start: 2024-08-22 | End: 2024-08-22

## 2024-08-22 RX ORDER — VALSARTAN 80 MG/1
TABLET ORAL
Qty: 90 TABLET | Refills: 0 | Status: SHIPPED | OUTPATIENT
Start: 2024-08-22

## 2024-08-22 NOTE — PROGRESS NOTES
The patient location is: home  The chief complaint leading to consultation is: uncontrolled bp    Visit type: audiovisual    Face to Face time with patient: 13  20 minutes of total time spent on the encounter, which includes face to face time and non-face to face time preparing to see the patient (eg, review of tests), Obtaining and/or reviewing separately obtained history, Documenting clinical information in the electronic or other health record, Independently interpreting results (not separately reported) and communicating results to the patient/family/caregiver, or Care coordination (not separately reported).         Each patient to whom he or she provides medical services by telemedicine is:  (1) informed of the relationship between the physician and patient and the respective role of any other health care provider with respect to management of the patient; and (2) notified that he or she may decline to receive medical services by telemedicine and may withdraw from such care at any time.    Notes:  Subjective     Patient ID: Bhavna Landry is a 69 y.o. female.    Chief Complaint: No chief complaint on file.    Hypertension  This is a chronic problem. Pertinent negatives include no chest pain, headaches or shortness of breath.     Bp has been running quite high.  8/4 bp  190/95.  Took clonidine.    Went into laundry room , arms elevated, and she passed out.  Was evaluated in ED.     Bp erratic since  175/75 this am  She is taking amloipine 5 mg bid.    Losartan 25 mg and CLonidine  only if BP elevated.    She finds it takes 3 h for losartan to lower BP.    She has h/o symptomatic hypotension.  Review of Systems   Constitutional:  Negative for fever and unexpected weight change.   HENT:  Negative for nasal congestion and postnasal drip.    Eyes:  Negative for pain, discharge and visual disturbance.   Respiratory:  Negative for cough, chest tightness, shortness of breath and wheezing.    Cardiovascular:  Negative  for chest pain and leg swelling.   Gastrointestinal:  Negative for abdominal pain, constipation, diarrhea and nausea.   Genitourinary:  Negative for difficulty urinating, dysuria and hematuria.   Integumentary:  Negative for rash.   Neurological:  Negative for headaches.   Psychiatric/Behavioral:  Negative for dysphoric mood and sleep disturbance. The patient is not nervous/anxious.           Objective     Physical Exam  Constitutional:       Appearance: Normal appearance. She is not ill-appearing or diaphoretic.   Neurological:      General: No focal deficit present.      Mental Status: She is alert and oriented to person, place, and time.   Psychiatric:         Mood and Affect: Mood normal.         Behavior: Behavior normal.         Thought Content: Thought content normal.         Judgment: Judgment normal.            Assessment and Plan     1. Essential hypertension    2. Syncope, unspecified syncope type    Other orders  -     Discontinue: valsartan (DIOVAN) 160 MG tablet; Take 1 tablet (160 mg total) by mouth once daily.  Dispense: 90 tablet; Refill: 0  -     valsartan (DIOVAN) 80 MG tablet; 1 tab po q hs  Dispense: 90 tablet; Refill: 0    3.  Hypotension in past.         Continue amlodipine 5 mg twice a day.  Stop losartan.  Start Valsartan 80 mg at bedtime.    Do not take clonidine for now.      Continue to participate in [facility].  Will cc pharmacist  Call with concerns.  No follow-ups on file.

## 2024-08-22 NOTE — PATIENT INSTRUCTIONS
Continue amlodipine 5 mg twice a day.  Stop losartan.  Start Valsartan 80 mg at bedtime.    Do not take clonidine for now.

## 2024-08-23 ENCOUNTER — HOSPITAL ENCOUNTER (OUTPATIENT)
Dept: RADIOLOGY | Facility: HOSPITAL | Age: 69
Discharge: HOME | End: 2024-08-23
Attending: NEUROLOGICAL SURGERY
Payer: MEDICARE

## 2024-08-23 ENCOUNTER — PATIENT OUTREACH (OUTPATIENT)
Dept: EMERGENCY MEDICINE | Facility: HOSPITAL | Age: 69
End: 2024-08-23
Payer: MEDICARE

## 2024-08-23 DIAGNOSIS — G96.198 EPIDURAL MASS: ICD-10-CM

## 2024-08-23 PROCEDURE — A9585 GADOBUTROL INJECTION: HCPCS | Performed by: NEUROLOGICAL SURGERY

## 2024-08-23 PROCEDURE — 25500020 PHARM REV CODE 255: Performed by: NEUROLOGICAL SURGERY

## 2024-08-23 PROCEDURE — 72156 MRI NECK SPINE W/O & W/DYE: CPT | Mod: TC

## 2024-08-23 PROCEDURE — 72156 MRI NECK SPINE W/O & W/DYE: CPT | Mod: 26,,, | Performed by: RADIOLOGY

## 2024-08-23 RX ORDER — GADOBUTROL 604.72 MG/ML
5 INJECTION INTRAVENOUS
Status: COMPLETED | OUTPATIENT
Start: 2024-08-23 | End: 2024-08-23

## 2024-08-23 RX ADMIN — GADOBUTROL 5 ML: 604.72 INJECTION INTRAVENOUS at 06:08

## 2024-08-26 ENCOUNTER — TELEPHONE (OUTPATIENT)
Dept: NEUROSURGERY | Facility: CLINIC | Age: 69
End: 2024-08-26
Payer: MEDICARE

## 2024-08-26 ENCOUNTER — CLINICAL SUPPORT (OUTPATIENT)
Dept: REHABILITATION | Facility: HOSPITAL | Age: 69
End: 2024-08-26
Payer: MEDICARE

## 2024-08-26 DIAGNOSIS — R53.1 GENERALIZED WEAKNESS: ICD-10-CM

## 2024-08-26 DIAGNOSIS — Z74.09 IMPAIRED FUNCTIONAL MOBILITY, BALANCE, GAIT, AND ENDURANCE: Primary | ICD-10-CM

## 2024-08-26 PROCEDURE — 97112 NEUROMUSCULAR REEDUCATION: CPT | Mod: PN

## 2024-08-26 NOTE — PROGRESS NOTES
[facility]   Physical Therapy Treatment Note      [id number]    Therapy Diagnosis:   Encounter Diagnoses   Name Primary?    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    She was not compliant with home exercise program.  Response to previous treatment: improved pain levels  Functional change: improved ability to ambulate    Pain: 4/10 beginning; /10 end of sesssion  Location: right buttocks, SIJ, and low back     Objective      See 8/15/2024 note     Treatment     Bhavna received the treatments listed below:      neuromuscular re-education activities to improve: Coordination, Kinesthetic, Sense, and Proprioception for 30 minutes. The following activities were included:  Nu step: level 4 for 8 minutes for sciatic neural flossing.    Pillow under hips:  Prone lyin minute   Prone on elbows: 1 minute. 2 rounds   Prone press ups on elbows: 2x10  Prone hip extension: 2x10    Seated:  Rows: green theraband 3x10  SAPD: green theraband 3x10  Low multifidi rows: green theraband 3x10  W's: green theraband 3x10    Patient  Education and Home Exercises       Education provided:   - compliance with HEP    Written Home Exercises Provided: yes. Exercises were reviewed and Bhavna was able to demonstrate them prior to the end of the session.  Bhavna demonstrated good  understanding of the education provided. See Electronic Medical Record under Patient Instructions for exercises provided during therapy sessions    Assessment     Bhavna is a 68 y.o. 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 Patient presents with signs and symptoms of chronic low back pain. Continued extension biased positioning for relief upon presenting to clinic. Additional seated posterior chain strengthening included without increased lower extremity pain noted.    Bhavna Is progressing well towards her goals.   Patient prognosis is Fair.     Patient will continue to benefit from skilled outpatient physical therapy to address the deficits listed in the problem list box on initial evaluation, provide pt/family education and to maximize pt's level of independence in the home and community environment.     Patient's spiritual, cultural and educational needs considered and pt agreeable to plan of care and goals.     Anticipated barriers to physical therapy: chronic re-occurrences; high intensity pain levels     Goals:   Short Term Goals (3 Weeks):  1. Patient will be compliant with home exercise program to supplement therapy in promoting functional mobility. progressing, not met   2. Patient will perform transverse abdominus activation with good control to demonstrate improved core strength. MET 8/15/2024  3. Patient will report no pain during bed mobility transitions to promote functional mobility. MET 8/15/2024  4. Patient  will improve impaired lower extremity myotomes/manual muscle tests  to >/=4/5 to improve strength for functional tasks. progressing, not met      Long Term Goals (6 Weeks):   1. Patient will improve FOTO score to </= 47% limited to decrease perceived limitation with maintaining/changing body position. MET 8/15/2024  2. Patient will be able to sleep for >/= 3 nights without waking from pain to improve quality of life. progressing, not met   3. Patient will improve impaired lower extremity myotomes/manual muscle tests to >/=4+/5 to improve strength for functional tasks. progressing , not met   4. Patient will be able to complete 6 MWT to improve aerobic tolerance and functional lower extremity strength and endurance. progressing, not met     Plan     see updated home exercise program     Stephanie Castellanos, PT

## 2024-08-27 ENCOUNTER — TELEPHONE (OUTPATIENT)
Dept: NEUROSURGERY | Facility: CLINIC | Age: 69
End: 2024-08-27
Payer: MEDICARE

## 2024-08-27 ENCOUNTER — PATIENT MESSAGE (OUTPATIENT)
Dept: ORTHOPEDICS | Facility: CLINIC | Age: 69
End: 2024-08-27
Payer: MEDICARE

## 2024-08-27 DIAGNOSIS — G89.29 CHRONIC LOW BACK PAIN WITHOUT SCIATICA, UNSPECIFIED BACK PAIN LATERALITY: ICD-10-CM

## 2024-08-27 DIAGNOSIS — M54.50 CHRONIC LOW BACK PAIN WITHOUT SCIATICA, UNSPECIFIED BACK PAIN LATERALITY: ICD-10-CM

## 2024-08-27 RX ORDER — HYDROCODONE BITARTRATE AND ACETAMINOPHEN 5; 325 MG/1; MG/1
1 TABLET ORAL EVERY 12 HOURS PRN
Qty: 60 TABLET | Refills: 0 | Status: SHIPPED | OUTPATIENT
Start: 2024-08-27

## 2024-08-27 NOTE — TELEPHONE ENCOUNTER
No care due was identified.  [facility] Embedded Care Due Messages. Reference number: [id number].   8/27/2024 11:17:52 AM CDT

## 2024-08-27 NOTE — TELEPHONE ENCOUNTER
----- Message from Candi Schwartz sent at 8/27/2024 10:24 AM CDT -----  Contact: [phone] Patient  Requesting an RX refill or new RX.    Is this a refill or new RX: new    RX name and strength (copy/paste from chart):  HYDROcodone-acetaminophen (NORCO) 5-325 mg per tablet    Is this a 30 day or 90 day RX: 60 tablet    Pharmacy name and phone # (copy/paste from chart):      [id number] - [place] - [address] AT [facility] [address]  [address] [address]  Phone: [phone] Fax: [phone]      The doctors have asked that we provide their patients with the following 2 reminders -- prescription refills can take up to 72 hours, and a friendly reminder that in the future you can use your [facility] account to request refills:

## 2024-08-27 NOTE — TELEPHONE ENCOUNTER
Please schedule her a NP appt with me in clinic.  Cervical spine.    Idania [facility], PA-C  Neurosurgery  [facility]  08/27/2024

## 2024-08-29 ENCOUNTER — TELEPHONE (OUTPATIENT)
Dept: SURGERY | Facility: CLINIC | Age: 69
End: 2024-08-29
Payer: MEDICARE

## 2024-08-29 ENCOUNTER — TELEPHONE (OUTPATIENT)
Dept: HEMATOLOGY/ONCOLOGY | Facility: CLINIC | Age: 69
End: 2024-08-29
Payer: MEDICARE

## 2024-08-29 NOTE — TELEPHONE ENCOUNTER
----- Message from Cheyanne Smith NP sent at 8/29/2024 10:57 AM CDT -----  Regarding: RE: Advise  Contact: [phone]  Yes, I have them and am working on them.  Probably will not be done until next week.  Thanks.  Please notify patient.  ----- Message -----  From: Curtis Chilel RN  Sent: 8/29/2024   9:53 AM CDT  To: Cheyanne Smith NP; Syl Prescott NP; #  Subject: FW: Advise                                       Did anyone receive her fmla forms a few days ago ?  ----- Message -----  From: Marialuisa Barrett  Sent: 8/29/2024   9:30 AM CDT  To: Luis CAMPOS Staff  Subject: Advise                                           Patient is calling in reference to FMLA form that she bought to office about 4 days ago per pt. She wants to know if form has been completed by provider and if it has she would like to know when form can be picked up. Please give pt a call.

## 2024-08-30 ENCOUNTER — TELEPHONE (OUTPATIENT)
Dept: HEMATOLOGY/ONCOLOGY | Facility: CLINIC | Age: 69
End: 2024-08-30
Payer: MEDICARE

## 2024-08-30 ENCOUNTER — TELEPHONE (OUTPATIENT)
Dept: PAIN MEDICINE | Facility: CLINIC | Age: 69
End: 2024-08-30
Payer: MEDICARE

## 2024-08-30 ENCOUNTER — OFFICE VISIT (OUTPATIENT)
Dept: PAIN MEDICINE | Facility: CLINIC | Age: 69
End: 2024-08-30
Payer: MEDICARE

## 2024-08-30 VITALS
HEIGHT: 65 IN | SYSTOLIC BLOOD PRESSURE: 105 MMHG | DIASTOLIC BLOOD PRESSURE: 56 MMHG | HEART RATE: 73 BPM | BODY MASS INDEX: 16.86 KG/M2 | WEIGHT: 101.19 LBS

## 2024-08-30 DIAGNOSIS — M51.36 DDD (DEGENERATIVE DISC DISEASE), LUMBAR: ICD-10-CM

## 2024-08-30 DIAGNOSIS — M54.17 LUMBOSACRAL RADICULOPATHY: Primary | ICD-10-CM

## 2024-08-30 DIAGNOSIS — M54.16 LUMBAR RADICULOPATHY: ICD-10-CM

## 2024-08-30 PROCEDURE — 99999 PR PBB SHADOW E&M-EST. PATIENT-LVL II: CPT | Mod: PBBFAC,,, | Performed by: STUDENT IN AN ORGANIZED HEALTH CARE EDUCATION/TRAINING PROGRAM

## 2024-08-30 RX ORDER — PREGABALIN 25 MG/1
25 CAPSULE ORAL NIGHTLY
Qty: 30 CAPSULE | Refills: 0 | Status: SHIPPED | OUTPATIENT
Start: 2024-08-30 | End: 2025-02-28

## 2024-08-30 NOTE — PROGRESS NOTES
[facility] -  Established Patient Evaluation    Chief Complaint  Chief Complaint   Patient presents with    Hip Pain    Leg Pain           8/30/2024    11:04 AM 6/21/2024    10:43 AM 4/2/2024     1:05 PM   Last 3 PDI Scores   Pain Disability Index (PDI) 35 48 36     Interval update 08/30/24:  Patient return to clinic for follow up on back pain.  She reports most recent right L3/L4 and L4/L5 right-sided transforaminal epidural steroid injection on 07/17/2024 did provide her with pain relief.  Her pain is gradually returning, but she still notes her pain is better than prior to injection.  Pain radiates to the right buttocks and right leg.  She reports pain over the sacrum as well.  She tried the Lyrica 50 mg prescribed by NSGY, but stated the medication sedated her.  She was interested in trying the Lyrica again at a lower dose.  Today she rates her pain 7/10.    Interval Update 06/21/2024: Patient return to clinic for follow up on back pain.  She has status post a lumbar SOPHY targeting L5-S1 on 05/22/2024 reporting 30% relief of her symptoms her daughter who is present with her did report that she was able to walk around her home without using her walker following this injection.  Despite the percentage of relief being low.  Continues to suffer from chronic right low back and right leg pain her pain starts in the low right low back radiating into her right buttocks wrapping around to the front of her right leg into her toes.  She denies any recent incident or trauma denies any falls denies any profound weakness.  She was also previously provided a right-sided sacral lateral branch RFA and was scheduled to have the left side RFA done however her right sciatic pain has taking over her SI joint/hip pain.  I will focus my energy on trying to alleviate her right-sided sciatica type pain.  She denies taking gabapentin anymore as she felt like the medicine was causing her ankles swell.  Today  she denies any profound weakness denies any bowel bladder dysfunction denies saddle anesthesia at this time.    Interval History 05/02/2024  Bhavna Landry presents today virtually for follow up her pain back.  She recently underwent right sided sacral lateral branch RFA and was scheduled to have left sided RFA performed, but her sciatica pain has flared up in the interim.  She would like to hold off on the left sacral lateral branch RFA as her right-sided radicular symptoms are more severe.  Pain radiates down the right lower extremity to the top of the foot.  She needs to use a walker to ambulate due to pain.  She has a history of lumbar radiculopathy and underwent L5/S1 interlaminar SOPHY in 08/2023 with excellent results.  She reports her current radicular symptoms are the same as prior.  She would like to repeat the lumbar epidural steroid injection.  She is currently taking gabapentin which is prescribed by her psychiatrist.  She denies any weakness in the lower extremities, saddle anesthesia, or loss of bowel or bladder function.  She currently rates her pain 8/10.       Interval History 04/02/2024:  Bhavna Landry presents today for follow up of her low back pain.  Her low back/sacral pain has returned.  In the past she has responded well to bilateral L5 Dorsal Ramus and Lateral Branches of S1, S2, and S3 RFA.  This is previously given her significant relief lasting several years.  She would like to repeat this procedure.  Denies any recent falls or trauma.  No changes to her pain.  Denies any radicular symptoms.  No weakness, saddle anesthesia, bowel or bladder changes.     Interval History (05/22/2023):  Bhavna Landry returns today for follow up she is s/p a a diagnostic Lumbar MBB targeting L4/5 and L5/S1 that  provided 0% relief of her low back and leg pain R>L. She would like to consider other injection that may help. She denies and new pain, denies B/B dysfunction, denies any profound weakness.  "    Current Pain Scales:  Current: 5/10              Typical Range: 5-9/10     Interval History (03/14/2023):  Bhavna Landry returns today for low back pain that radiates into the b/l thighs.  Pain is described as clenching pain that has worsened over last 2 years. No trauma or surgery.  Pain is worse with extension.  Pain is better with heat, lying down, gabapentin, tylenol and tramadol.  She is scheduled to start PT next week.  Currently, the bilateral hip and bilateral leg pain is stable.  Avoid NSAIDs due to history of gastric ulcer.     Current Pain Scales:  Current: 7/10              Typical Range: 7-8/10     Background from Chart Review  9/16/2020- Mrs. Landry presents to clinic today reporting left hip pain for the past 3-4 mos, which has not improved with PT.  Pain starts in the lateral left hip "on the bone" and radiates through the buttock, down the lateral thigh not passing the knee, and into the anterolateral thigh.  She endorses regular stretching and states that this pain is distinctly different from the SI pain for which she received SI RFA in the past.     6/09/2020-   64-year-old female presents status post left then right  DR 5+ lateral branches of the S1, S2 and S3 RFA with reported 100% continued  relief she reports that her pain score today is 0/10.  She reports improvement with her ADLs and overall improvement of her functionality.     05/05/2020  presents tele-medicine appointment for a follow-up appointment for low back, left groin and bilateral hip pain.  Since the last visit, Bhavna Landry states the pain has been persistant. Current pain intensity is 9/10.  Patient reports onset of pain 2 weeks, patient states bending forward and sitting for long periods of time increase her pain.  Pain is described as aching.  She reports no radicular symptoms in either leg.  Worst pain is not out of 10.  Patient is status post right and left L5 Dorsal Ramus and Lateral Branches of S1, S2, and S3 (4 " levels) RFA reported 80-90% relief for a minimal 6 months.  Pain is now returned and patient like to reschedule procedure.        Treatment History  PT/OT: yes  HEP: yes  TENS:    Injections:   - 07/17/2024-right L3/L4 and L4/L5 transforaminal epidural steroid injection  -05/22/2024 Lumbar Interlaminar Epidural Steroid Injection L5/S1 30%  Bilateral SI joint injections, bilateral  Dorsal ramus block(DR5, S1,S2,S3),  bilateral L5 Dorsal Ramus and Lateral Branches of S1, S2, and S3 (4 levels) RFA, GTB injections   12/08/2023 - Cervical Interlaminar Epidural Steroid Injection C6/C7 under Fluoroscopic Guidance   08//02/2023 -  Lumbar Interlaminar Epidural Steroid Injection L5/S1   05/03/2023 -  Diagnostic Bilateral L3, L4, and L5 Lumbar Medial Branch Block under Fluoroscopy  - No relief    Surgery:  Medications:   - NSAIDS: avoid due to gastric ulcer   - MSK Relaxants:   - TCAs:   - SNRIs: Venlafaxine  - Topicals: Voltaren  - Opioids: Tramadol   - Anticonvulsants: Gabapentin    Current Pain Medications  Gabapentin  Tramadol      Full Medication List    Current Outpatient Medications:     albuterol (PROVENTIL) 2.5 mg /3 mL (0.083 %) nebulizer solution, Take 3 mLs (2.5 mg total) by nebulization every 6 (six) hours as needed for Wheezing. Rescue, Disp: 90 each, Rfl: 2    albuterol (PROVENTIL/VENTOLIN HFA) 90 mcg/actuation inhaler, USE 2 INHALATIONS BY MOUTH 4  TIMES DAILY AS NEEDED, Disp: 34 g, Rfl: 3    amLODIPine (NORVASC) 5 MG tablet, Take 1 tablet (5 mg total) by mouth 2 (two) times a day., Disp: 180 tablet, Rfl: 1    ascorbic acid, vitamin C, (VITAMIN C) 500 MG tablet, Take 500 mg by mouth once daily., Disp: , Rfl:     aspirin (ECOTRIN) 81 MG EC tablet, Take 81 mg by mouth once daily., Disp: , Rfl:     atorvastatin (LIPITOR) 80 MG tablet, TAKE 1 TABLET(80 MG) BY MOUTH EVERY DAY, Disp: 90 tablet, Rfl: 0    baclofen (LIORESAL) 10 MG tablet, 1 tab po tid for muscle cramps., Disp: 60 tablet, Rfl: 1    benztropine  (COGENTIN) 0.5 MG tablet, TAKE 1 TABLET(0.5 MG) BY MOUTH TWICE DAILY, Disp: 180 tablet, Rfl: 3    budesonide-formoterol 80-4.5 mcg (SYMBICORT) 80-4.5 mcg/actuation HFAA, INHALE 2 PUFFS BY MOUTH TWICE DAILY, Disp: 30.6 g, Rfl: 2    exemestane (AROMASIN) 25 mg tablet, Take 1 tablet (25 mg total) by mouth once daily., Disp: 30 tablet, Rfl: 11    fluticasone (VERAMYST) 27.5 mcg/actuation nasal spray, 2 sprays by Nasal route as needed for Rhinitis., Disp: , Rfl:     fluticasone propionate (FLONASE) 50 mcg/actuation nasal spray, 1 spray by Each Nostril route once daily., Disp: , Rfl:     folic acid (FOLVITE) 1 MG tablet, Take 1 tablet (1,000 mcg total) by mouth once daily., Disp: 90 tablet, Rfl: 3    HYDROcodone-acetaminophen (NORCO) 5-325 mg per tablet, Take 1 tablet by mouth every 12 (twelve) hours as needed for Pain., Disp: 60 tablet, Rfl: 0    isosorbide mononitrate (IMDUR) 30 MG 24 hr tablet, TAKE 1 TABLET(30 MG) BY MOUTH DAILY, Disp: 90 tablet, Rfl: 2    LIDOcaine (LIDODERM) 5 %, Place 1 patch onto the skin once daily. Remove & Discard patch within 12 hours or as directed by MD, Disp: 30 patch, Rfl: 0    meloxicam (MOBIC) 7.5 MG tablet, Take 1 tablet (7.5 mg total) by mouth once daily., Disp: 14 tablet, Rfl: 0    methotrexate 2.5 MG Tab, TAKE 8 TABLETS BY MOUTH EVERY 7 DAYS. TAKE 4 TABLETS MONDAY MORNING AND TAKE 4 TABLETS MOND NIGHT ONLY, Disp: 144 tablet, Rfl: 0    omega-3 fatty acids/fish oil (FISH OIL-OMEGA-3 FATTY ACIDS) 300-1,000 mg capsule, Take by mouth once daily., Disp: , Rfl:     omeprazole (PRILOSEC) 20 MG capsule, TAKE 1 CAPSULE(20 MG) BY MOUTH EVERY DAY, Disp: 30 capsule, Rfl: 11    omeprazole (PRILOSEC) 40 MG capsule, Take 1 capsule (40 mg total) by mouth every morning., Disp: 90 capsule, Rfl: 3    risperiDONE (RISPERDAL) 2 MG tablet, TAKE 1 TABLET(2 MG) BY MOUTH EVERY MORNING, Disp: 90 tablet, Rfl: 3    risperiDONE (RISPERDAL) 4 MG tablet, TAKE 1 TABLET(4 MG) BY MOUTH EVERY EVENING, Disp: 90 tablet,  Rfl: 3    valsartan (DIOVAN) 80 MG tablet, 1 tab po q hs, Disp: 90 tablet, Rfl: 0    venlafaxine (EFFEXOR-XR) 75 MG 24 hr capsule, Take 1 capsule (75 mg total) by mouth once daily., Disp: 90 capsule, Rfl: 3    vitamin D (VITAMIN D3) 1000 units Tab, Take 1,000 Units by mouth once daily., Disp: , Rfl:     vitamin E 200 UNIT capsule, Take 200 Units by mouth once daily., Disp: , Rfl:     zinc gluconate 50 mg tablet, Take 50 mg by mouth once daily., Disp: , Rfl:     ciclopirox (PENLAC) 8 % Soln, Apply topically nightly. Paint on affected nail(s) once per night, remove residue once per week with alcohol, Disp: 6.6 mL, Rfl: 3    pregabalin (LYRICA) 25 MG capsule, Take 1 capsule (25 mg total) by mouth every evening., Disp: 30 capsule, Rfl: 0  No current facility-administered medications for this visit.    Facility-Administered Medications Ordered in Other Visits:     tropicamide 1% ophthalmic solution 1 drop, 1 drop, Right Eye, On Call Procedure, Karen Song MD, 1 drop at 08/01/19 0648     Review of Systems  ROS  REVIEW OF SYSTEMS:    GENERAL:  No weight loss, malaise or fevers.  HEENT:   No recent changes in vision or hearing  NECK:  No difficulty with swallowing. No stridor.   RESPIRATORY:  Negative for cough, wheezing or shortness of breath, patient denies any recent URI.  CARDIOVASCULAR:  Negative for chest pain, leg swelling or palpitations. +HTN  GI:  Negative for abdominal discomfort, blood in stools or black stools or change in bowel habits.  MUSCULOSKELETAL:  See HPI.  SKIN:  Negative for lesions, rash, and itching.  PSYCH:  No mood disorder or recent psychosocial stressors.   +anxiety +depression   HEMATOLOGY/LYMPHOLOGY:  Negative for prolonged bleeding, bruising easily or swollen nodes.  Patient is not currently taking any anti-coagulants  NEURO:   No history of headaches, syncope, paralysis, seizures or tremors.  All other reviewed and negative other than HPI.      Medical History  Past Medical History:    Diagnosis Date    Allergy     Amblyopia     Anemia     Anticoagulant long-term use     Arthritis 1992    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, estrogen receptor positive 2022    Cataract     Depression     Dry eyes     Dry mouth     Duane's syndrome of right eye     Early dry stage nonexudative age-related macular degeneration of both eyes 2022    Fibromyalgia 2014    Fibromyalgia     Fractured hip     RIGHT HIP    GERD (gastroesophageal reflux disease)     History of psychiatric hospitalization     Hyperlipidemia 1992    Hypertension     Hypocalcemia     Hyponatremia     Kidney stone     Migraine headache     Osteoporosis     Pressure ulcer of unspecified site, unspecified stage     Psoriatic arthritis 1992    Recurrent upper respiratory infection (URI)     Right knee pain     post knee replacement surgery (possible rejectiion of metal)    RLS (restless legs syndrome)     Schizophrenia 1992    stable on meds    Sciatica     Squamous cell carcinoma of skin     Urinary tract infection         Surgical History  Past Surgical History:   Procedure Laterality Date    brow ptosis repair Right 2019    Surgeon: Dr. Karla Henson    CATARACT EXTRACTION       SECTION      COLONOSCOPY N/A 2016    Procedure: COLONOSCOPY;  Surgeon: ANDRIA Connelly MD;  Location: [address]);  Service: Endoscopy;  Laterality: N/A;    COLONOSCOPY N/A 2022    Procedure: COLONOSCOPY;  Surgeon: Mikey Walters MD;  Location: [facility];  Service: Endoscopy;  Laterality: N/A;    cyst removed from right sinus  1982    EPIDURAL STEROID INJECTION INTO CERVICAL SPINE N/A 2023    Procedure: C6-7 SOPHY;  Surgeon: Spring Jensen MD;  Location: [facility];  Service: Pain Management;  Laterality: N/A;  20 mins    EPIDURAL STEROID INJECTION INTO LUMBAR SPINE N/A 2023    Procedure: Injection-steroid-epidural-lumbar L5-S1;  Surgeon: Chirag  MD Judy;  Location: [facility];  Service: Pain Management;  Laterality: N/A;  asa    EPIDURAL STEROID INJECTION INTO LUMBAR SPINE N/A 5/22/2024    Procedure: L5/S1 Interlaminar SOPHY;  Surgeon: Maile Storm DO;  Location: [facility];  Service: Pain Management;  Laterality: N/A;  20mins-ASA 5d    ESOPHAGOGASTRODUODENOSCOPY N/A 02/07/2023    Procedure: EGD (ESOPHAGOGASTRODUODENOSCOPY);  Surgeon: Dougie Shea MD;  Location: [facility];  Service: Endoscopy;  Laterality: N/A;    FOOT FRACTURE SURGERY      HYSTERECTOMY      VAGINAL HYSTERECTOMY WITHOUT BSO - ENDOMETRIOSIS    INJECTION FOR SENTINEL NODE IDENTIFICATION Right 05/09/2022    Procedure: INJECTION, FOR SENTINEL NODE IDENTIFICATION-Right;  Surgeon: NEAL Dhillon MD;  Location: [facility] OR;  Service: General;  Laterality: Right;    INJECTION OF ANESTHETIC AGENT AROUND MEDIAL BRANCH NERVES INNERVATING LUMBAR FACET JOINT N/A 04/11/2019    Procedure: Lumbo-sacral Block, DR5 and Lateral Branches of S1,S2, S3;  Surgeon: Michelle Pineda Jr., MD;  Location: [facility];  Service: Pain Management;  Laterality: N/A;  Pt takes  and states she holds ASA on her own whenever she has procedures.  Instructed to hold x 3 days prior to procedure.      INJECTION OF ANESTHETIC AGENT AROUND MEDIAL BRANCH NERVES INNERVATING LUMBAR FACET JOINT Bilateral 05/03/2023    Procedure: Block-nerve-medial branch-lumbar bilateral L3, L4, L5;  Surgeon: Maile Storm DO;  Location: [facility];  Service: Pain Management;  Laterality: Bilateral;  asa    INJECTION OF ANESTHETIC AGENT INTO SACROILIAC JOINT Right 08/30/2018    Procedure: BLOCK, SACROILIAC JOINT-Right- ORAL SEDATION;  Surgeon: Michelle Pineda Jr., MD;  Location: [facility];  Service: Pain Management;  Laterality: Right;    INJECTION OF ANESTHETIC AGENT INTO SACROILIAC JOINT Bilateral 09/27/2018    Procedure: BLOCK, SACROILIAC JOINT-BILATERAL;  Surgeon: Michelle Pineda Jr., MD;   Location: [facility];  Service: Pain Management;  Laterality: Bilateral;  Please keep at 10:00 due to trasnsportation    INJECTION OF ANESTHETIC AGENT INTO SACROILIAC JOINT Bilateral 02/07/2019    Procedure: Bilateral Sacroiliac Joint Injection - Per Dr Pineda, not necessary to hold ASA.;  Surgeon: Michelle Pineda Jr., MD;  Location: [facility];  Service: Pain Management;  Laterality: Bilateral;    INJECTION, SPINE, LUMBOSACRAL, TRANSFORAMINAL APPROACH Right 7/17/2024    Procedure: Right  L3/4  and L4/5 TFESI;  Surgeon: Maile Storm DO;  Location: [facility];  Service: Pain Management;  Laterality: Right;  20 mins  ASA 5 days    INTRAOCULAR PROSTHESES INSERTION Right 08/01/2019    Procedure: INSERTION, IOL PROSTHESIS;  Surgeon: Karen Song MD;  Location: [address];  Service: Ophthalmology;  Laterality: Right;    INTRAOCULAR PROSTHESES INSERTION Left 09/26/2019    Procedure: INSERTION, IOL PROSTHESIS;  Surgeon: Karen Song MD;  Location: [address];  Service: Ophthalmology;  Laterality: Left;    JOINT REPLACEMENT Right     knee    KNEE SURGERY      MASTECTOMY Right 05/09/2022    Procedure: MASTECTOMY-Right;  Surgeon: NEAL Dhillon MD;  Location: [facility];  Service: General;  Laterality: Right;  2.5 HOURS    PHACOEMULSIFICATION OF CATARACT Right 08/01/2019    Procedure: PHACOEMULSIFICATION, CATARACT;  Surgeon: Karen Song MD;  Location: [address];  Service: Ophthalmology;  Laterality: Right;    PHACOEMULSIFICATION OF CATARACT Left 09/26/2019    Procedure: PHACOEMULSIFICATION, CATARACT;  Surgeon: Karen Song MD;  [facility];  Service: Ophthalmology;  Laterality: Left;    RADIOFREQUENCY ABLATION, NERVE, PERIPHERAL Right 04/24/2024    Procedure: RIGHT L5 Dorsal Ramus and RIGHT Lateral Branches of S1, S2, and S3;  Surgeon: Maile Storm DO;  Location: [facility];  Service: Pain Management;  Laterality: Right;  30 mins     RADIOFREQUENCY THERMOCOAGULATION Right 05/07/2019    Procedure: RADIOFREQUENCY THERMAL COAGULATION RIGHT DORSAL RAMUS 5 AND LATERAL BRANCH OF S1, S2 AND S3;  Surgeon: Michelle Pineda Jr., MD;  Location: [facility];  Service: Pain Management;  Laterality: Right;    RADIOFREQUENCY THERMOCOAGULATION Left 05/14/2019    Procedure: RADIOFREQUENCY THERMAL COAGULATION LEFT DORSAL RAMUS 5 AND LATERAL BRANCH OF S1,S2 AND S3;  Surgeon: Michelle Pineda Jr., MD;  Location: [facility];  Service: Pain Management;  Laterality: Left;    RADIOFREQUENCY THERMOCOAGULATION Right 10/22/2019    Procedure: RADIOFREQUENCY THERMAL COAGULATION---DARSAL RAMUS 5 and LATERAL S1,S2,and S3 Right;  Surgeon: Michelle Pineda Jr., MD;  Location: [facility];  Service: Pain Management;  Laterality: Right;  patient to sign consent DOS    RADIOFREQUENCY THERMOCOAGULATION Left 10/29/2019    Procedure: RADIOFREQUENCY THERMAL COAGULATION - LEFT - DR5, S1,S2, AND S3;  Surgeon: Michelle Pineda Jr., MD;  Location: [facility];  Service: Pain Management;  Laterality: Left;    RADIOFREQUENCY THERMOCOAGULATION Left 05/26/2020    Procedure: RADIOFREQUENCY THERMAL COAGULATION--Left DR5+ lateral branches of S1, S2, S3;  Surgeon: Michelle Pineda Jr., MD;  Location: [facility];  Service: Pain Management;  Laterality: Left;    RADIOFREQUENCY THERMOCOAGULATION Right 06/02/2020    Procedure: RADIOFREQUENCY THERMAL COAGULATION--Right DR5+ lateral branches of S1, S2, S3;  Surgeon: Michelle Pineda Jr., MD;  Location: [facility];  Service: Pain Management;  Laterality: Right;    SENTINEL LYMPH NODE BIOPSY Right 05/09/2022    Procedure: BIOPSY, LYMPH NODE, SENTINEL-Right;  Surgeon: NEAL Dhillon MD;  Location: [facility];  Service: General;  Laterality: Right;    SINUS SURGERY      Surgery on right knee  07/09/1982    TONSILLECTOMY      tumor removed from back left side upper shoulder  03/17/2006        Physical Exam  Vitals:    08/30/24 1102   BP:  "(!) 105/56   Pulse: 73   Weight: 45.9 kg (101 lb 3.1 oz)   Height: 5' 5" (1.651 m)   PainSc:   7   PainLoc: Hip    - Virtual Visit     Virtual Visit Physical Exam - 05/02/2024  GEN: No acute distress. Calm, comfortable  HENT: Normocephalic, atraumatic, moist mucous membranes  EYE: Anicteric sclera, non-injected  CV: Non-diaphoretic.  CHEST: Breathing comfortably. Chest expansion symmetric  EXT: No clubbing, cyanosis.   Psych: Mood and affect are appropriate  GAIT: Independent, normal ambulation        Ortho/SPM Exam  PHYSICAL EXAMINATION Prior:    GENERAL: Well appearing, in no acute distress, alert and oriented x3.  PSYCH:  Mood and affect appropriate.  SKIN: Skin color, texture, turgor normal, no rashes or lesions.  HEAD/FACE:  Normocephalic, atraumatic. Cranial nerves grossly intact.  NECK: Normal ROM. Supple.   CV: RRR with palpation of the radial artery.  PULM: No evidence of respiratory difficulty, symmetric chest rise.  GI:  Soft and non-distended.  MSK: Straight leg raising is negative to radicular pain. There is pain to palpation over the facet joints of the lumbar spine. There is pain with lumbar facet loading.  Normal range of motion without pain reproduction with lumbar flexion. Pain with extension.  Peripheral joint ROM is full and pain free without obvious instability or laxity in all four extremities. No deformities, edema, or skin discoloration.  No atrophy or tone abnormalities are noted.   NEURO: Bilateral upper and lower extremity coordination and strength is symmetric.  No loss of sensation is noted.  MENTAL STATUS: A x O x 3, good concentration, speech is fluent and goal directed  MOTOR: 5/5 in all muscle groups  GAIT:  Antalgic..  Ambulates unassisted.    Imaging  MRI LUMBAR SPINE WITHOUT CONTRAST     CLINICAL HISTORY:  Low back pain, symptoms persist with > 6wks conservative treatment; Dorsalgia, unspecified     TECHNIQUE:  Multiplanar, multisequence MR images were acquired from the " thoracolumbar junction to the sacrum without the administration of contrast.     COMPARISON:  CT lumbar spine dated 05/31/2024 and MRI lumbar spine dated 03/11/2023     FINDINGS:  Lumbar spine vertebral body heights appear maintained with thoracolumbar levocurvature.  Variable multilevel disc space narrowing and Modic type 1 endplate changes.  The spinal cord terminus lies near L2-L3.  Probable thin fatty filum terminale, similar. Remaining visualized prevertebral and paraspinal soft tissues demonstrate no acute abnormality.     T12-L1: Circumferential bulge without significant spinal canal stenosis or neural foraminal impingement.     L1-L2: Circumferential bulge with facet arthropathy.  Mild flavum buckling and partial flattening of the ventral thecal sac.  Right neural foraminal encroachment.     L2-L3: Circumferential bulge and facet arthropathy.  No significant spinal canal stenosis.  Mild neural foraminal narrowing.     L3-L4: Circumferential bulge with large superimposed left paracentral extrusion with approximate 0.8 cm inferior migration.  Effacement of the left paracentral thecal sac and left lateral recess and severe left neural foraminal narrowing.     L4-L5: Circumferential bulge with superimposed left paracentral extrusion with superior migration of approximately 1.0 cm.  Flavum thickening and mild facet DJD contributing to mild spinal canal stenosis.  Mild right and moderate left neural foraminal narrowing.     L5-S1: Circumferential bulge and facet DJD with flavum thickening.  Superimposed right subarticular extrusion with inferior migration demonstrating slight variable T2 signal to the parent disc.  Findings contribute to lateral recess narrowing bilaterally with mild-moderate spinal canal stenosis.  Additional abutment and probable encroachment of the right transiting S1 nerve root.  Moderate-severe left and severe right neural foraminal narrowing.     Impression:     Thoracolumbar scoliosis with  multilevel lumbar spondylosis.  Detailed findings on a level by level basis as above.        Electronically signed by:Henok Damon  Date:                                            07/16/2024    Labs:  BMP  Lab Results   Component Value Date     08/14/2024    K 4.0 08/14/2024     08/14/2024    CO2 25 08/14/2024    BUN 8 08/14/2024    CREATININE 0.8 08/14/2024    CALCIUM 9.3 08/14/2024    ANIONGAP 7 (L) 08/14/2024    EGFRNORACEVR >60 08/14/2024     Lab Results   Component Value Date    ALT 11 08/14/2024    AST 19 08/14/2024    GGT 91 (H) 07/28/2014    ALKPHOS 56 08/14/2024    BILITOT 0.4 08/14/2024       Assessment:  Problem List Items Addressed This Visit    None  Visit Diagnoses       Lumbosacral radiculopathy    -  Primary    Relevant Orders    Procedure Order to Pain Management    Lumbar radiculopathy        Relevant Orders    Procedure Order to Pain Management    DDD (degenerative disc disease), lumbar        Relevant Orders    Procedure Order to Pain Management                  03/14/2023 -Bhavnaoumou Landry is a 69 y.o. 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, estrogen receptor positive (04/13/2022), Cataract, Depression, Dry eyes, Dry mouth, Duane's syndrome of right eye, Early dry stage nonexudative age-related macular degeneration of both eyes (09/20/2022), Fibromyalgia (04/17/2014), Fibromyalgia, Fractured hip, GERD (gastroesophageal reflux disease), History of psychiatric hospitalization, Hyperlipidemia (02/02/1992), Hypertension, Hypocalcemia, Hyponatremia, Kidney stone, Migraine headache, Osteoporosis, Pressure ulcer of unspecified site, unspecified stage, Psoriatic arthritis (02/02/1992), Recurrent upper respiratory infection (URI), Right knee pain, RLS (restless legs syndrome), Schizophrenia (02/02/1992), Sciatica, Squamous cell carcinoma of skin,  and Urinary tract infection.  By history and examination this patient has chronic low back pain without radiculopathy.  The underlying cause cause is facet arthritis and deconditioning.  Pathology is confirmed by imaging.  We discussed the underlying diagnoses and multiple treatment options including non-opioid medications, interventional procedures, physical therapy, and home exercise.  The risks and benefits of each treatment option were discussed and all questions were answered.      5/22/2023- 66 y/o female with a Hx of chronic low back  and bilateral leg pain she is s/p a diagnosis lumbar MBB targeting L4/5 and L5/S1 reporting 0% relief of her symptoms. Her Lumbar MRI multilevel disc bulges starting   at L3 through S1.  She has severe right, moderate left neural foraminal narrowing at L5-S1 thta may be causing her Low back and leg pain.  Her pain was refractory to diagnositic low lumbar MBB so i will attempt a L4-5 Lumbar SOPHY.     04/02/2024 Bhavna Landry presents for follow up of her low back/SI joint pain.  Patient has previously undergone bilateral L5 Dorsal Ramus and Lateral Branches of S1, S2, and S3 RFA with significant and lasting relief.  Prior RFA lasted about 2 years.  She would like to repeat the procedure as the pain has now returned.    05/02/2024 - Patient presents virtually today for follow up of her low back pain.  Today her biggest complaint is radicular symptoms primarily on the right.  Patient has a history of lumbar radiculopathy and previously underwent an L5/S1 epidural steroid injection in 08/2023 with excellent relief.  Her pain has returned and she would like to repeat the epidural steroid injection.  She would like to hold off on the left-sided sacral RFA for now until we can address her radicular symptoms as they are more severe.  Discussed with the patient that she may benefit from a surgical consult if epidural does not provide persistent relief.    6/21/2024- 67 y/o female  presents for continued pain in the right low back right buttocks and right leg that radiates into her right foot. Her recent L5-S1 IESI provided 30% relief of her symptoms.  Her MRI significant for disc bulges throughout the lower lumbar spine L3 through S1 she has severe right moderate left neural foraminal narrowing at L5-S1 that may be causing right low back and right leg pain.  Today we discussed focusing her pain interventions on the right side in effort to reduce some of the right-sided symptoms as she does not complain about pain in the left lumbar or left leg.    08/30/2024 - patient presents for follow up of her low back pain with right-sided radicular symptoms.  Most recent right transforaminal epidural steroid injection at L3/L4 and L4/L5 did provide her with some relief..  She continues to have pain that radiates into the buttocks and down the leg as well as pain over the sacrum.  We discussed perform a caudal epidural steroid injection.  Patient was amenable to this plan.    Plan & Recommendations  Procedures:  s/f caudal epidural steroid injection  Medications:  Trial of Lyrica 25 mg QHS   Imaging:  Updated lumbar MRI was reviewed and discussed in detail  PT/OT/HEP: I encouraged the patient to maintain a home exercise regimen that includes daily, moderate cardiovascular exercise lasting at least 30 minutes.  This may include yoga, gela chi, walking, swimming, aqua aerobics, or other exercises that maintain a heart rate of 50-70% of the calculated maximum heart rate.  I also encouraged light, daily stretching focused on the target area.  Follow Up: RTC 2 weeks post procedure    Maile Storm DO   Interventional Pain Management    Disclaimer: This note was partly generated using dictation software which may occasionally result in transcription errors.

## 2024-08-30 NOTE — TELEPHONE ENCOUNTER
----- Message from Nani Storm DO sent at 2024 11:39 AM CDT -----  Regarding: Order for PHUONG HAZEL    Patient Name: PHUONG HAZEL([id number])  Sex: Female  : 1955      PCP: MILLIE TELLO    Center: [facility] CENTRAL BILLING OFFICE     Types of orders made on 2024: Medications, Procedure Request    Order Date:2024  Ordering User:NANI STORM [[id number]]  Encounter Provider:Nani Storm DO [[id number]]    Authorizing Provider: Nani Storm DO [[id number]]  Department:[facility] PAIN MANAGEMENT[[id number]]    Common Order Information  Procedure -> Epidural Injection (specify level) Cmt: Caudal    Pre-op Diagnosis -> Lumbar radiculopathy       -> DDD (degenerative disc disease), lumbar     Order Specific Information  Order: Procedure Order to Pain Management [Custom: [id number]]  Order #:          [id number]: 1 FUTURE    Prior  ity: Routine  Class: Clinic Performed    Future Order Information      Expires on:2025            Expected by:2024                   Associated Diagnoses      M54.16 Lumbar radiculopathy      M51.36 DDD (degenerative disc disease), lumbar      M54.17 Lumbosacral radiculopathy      Physician -> gelter         Is patient on anti-coagulants? Cmt: ASA        Facility Name: -> [place]           Priority:   NRoutine  Class: Clinic Performed    Future Order Information      Expires on:2025            Expected by:2024                   Associated Diagnoses      M54.16 Lumbar radiculopathy      M51.36 DDD (degenerative disc disease), lumbar      M54.17 Lumbosacral radiculopathy      Procedure -> Epidural Injection (specify level) Cmt: Caudal        Physician -> gelter         Is patient on anti-coagulants? Cmt: ASA          Pre-op Diagnosis -> Lumbar radiculopathy           -> DDD (degenerative disc disease), lumbar         Facility Name: -> [place]

## 2024-08-30 NOTE — TELEPHONE ENCOUNTER
----- Message from Tonia Whipple sent at 8/29/2024  4:43 PM CDT -----  Regarding: Returning a Missed Call  Contact: :Bhavna Landry  Returning a Missed Call     Caller:Bhavna Landry         Returning call to: Curtis     Caller can be reached @:[phone] (home)       Nature of the call:Patient is returning call about paperwork. Requesting a call back

## 2024-09-03 ENCOUNTER — OFFICE VISIT (OUTPATIENT)
Dept: CARDIOLOGY | Facility: CLINIC | Age: 69
End: 2024-09-03
Payer: MEDICARE

## 2024-09-03 ENCOUNTER — TELEPHONE (OUTPATIENT)
Dept: PAIN MEDICINE | Facility: CLINIC | Age: 69
End: 2024-09-03
Payer: MEDICARE

## 2024-09-03 VITALS
SYSTOLIC BLOOD PRESSURE: 114 MMHG | HEIGHT: 60 IN | WEIGHT: 108 LBS | HEART RATE: 66 BPM | BODY MASS INDEX: 21.2 KG/M2 | DIASTOLIC BLOOD PRESSURE: 64 MMHG | OXYGEN SATURATION: 96 %

## 2024-09-03 DIAGNOSIS — M54.17 LUMBOSACRAL RADICULOPATHY: ICD-10-CM

## 2024-09-03 DIAGNOSIS — M54.16 LUMBAR RADICULOPATHY: Primary | ICD-10-CM

## 2024-09-03 DIAGNOSIS — I87.2 VENOUS INCOMPETENCE: ICD-10-CM

## 2024-09-03 DIAGNOSIS — E78.5 HYPERLIPIDEMIA, UNSPECIFIED HYPERLIPIDEMIA TYPE: ICD-10-CM

## 2024-09-03 DIAGNOSIS — I48.0 PAF (PAROXYSMAL ATRIAL FIBRILLATION): ICD-10-CM

## 2024-09-03 DIAGNOSIS — I70.0 ATHEROSCLEROSIS OF AORTA: Primary | ICD-10-CM

## 2024-09-03 DIAGNOSIS — M51.36 DDD (DEGENERATIVE DISC DISEASE), LUMBAR: ICD-10-CM

## 2024-09-03 DIAGNOSIS — I10 ESSENTIAL HYPERTENSION: Chronic | ICD-10-CM

## 2024-09-03 PROCEDURE — 1160F RVW MEDS BY RX/DR IN RCRD: CPT | Mod: CPTII,S$GLB,, | Performed by: INTERNAL MEDICINE

## 2024-09-03 PROCEDURE — 3078F DIAST BP <80 MM HG: CPT | Mod: CPTII,S$GLB,, | Performed by: INTERNAL MEDICINE

## 2024-09-03 PROCEDURE — 1101F PT FALLS ASSESS-DOCD LE1/YR: CPT | Mod: CPTII,S$GLB,, | Performed by: INTERNAL MEDICINE

## 2024-09-03 PROCEDURE — 3074F SYST BP LT 130 MM HG: CPT | Mod: CPTII,S$GLB,, | Performed by: INTERNAL MEDICINE

## 2024-09-03 PROCEDURE — 99214 OFFICE O/P EST MOD 30 MIN: CPT | Mod: S$GLB,,, | Performed by: INTERNAL MEDICINE

## 2024-09-03 PROCEDURE — 99999 PR PBB SHADOW E&M-EST. PATIENT-LVL III: CPT | Mod: PBBFAC,,, | Performed by: INTERNAL MEDICINE

## 2024-09-03 PROCEDURE — 1126F AMNT PAIN NOTED NONE PRSNT: CPT | Mod: CPTII,S$GLB,, | Performed by: INTERNAL MEDICINE

## 2024-09-03 PROCEDURE — 3008F BODY MASS INDEX DOCD: CPT | Mod: CPTII,S$GLB,, | Performed by: INTERNAL MEDICINE

## 2024-09-03 PROCEDURE — 3288F FALL RISK ASSESSMENT DOCD: CPT | Mod: CPTII,S$GLB,, | Performed by: INTERNAL MEDICINE

## 2024-09-03 PROCEDURE — 1159F MED LIST DOCD IN RCRD: CPT | Mod: CPTII,S$GLB,, | Performed by: INTERNAL MEDICINE

## 2024-09-03 PROCEDURE — 4010F ACE/ARB THERAPY RXD/TAKEN: CPT | Mod: CPTII,S$GLB,, | Performed by: INTERNAL MEDICINE

## 2024-09-03 RX ORDER — ISOSORBIDE MONONITRATE 30 MG/1
30 TABLET, EXTENDED RELEASE ORAL DAILY
Qty: 90 TABLET | Refills: 3 | Status: SHIPPED | OUTPATIENT
Start: 2024-09-03 | End: 2025-09-03

## 2024-09-03 NOTE — PROGRESS NOTES
Subjective:   @Patient ID:  Bhavna Landry is a 69 y.o. female who presents for follow-up of atrial fibrillation       HPI:   September 2024:  Follow up. She has been doing well since last visit. No CP or significant LEMON       November 2021:  Patient her for follow up. She stated that she is doing ok . No recurrent chest pain. Had the stress test that was -ve for ischemia.   She f/u with  Nephrology/ low salt intake is contributing to her hyponatremia.   Amlodipine dose was decreased to 5 mg due to orthostatic symptoms per reports from nephrology notes      Historically     She went to the ED 11/15/2019 with A.fib with RVR  And she was spontaneously converted after cardizem. She had upper respiratory tract infection at that time.     She was hospitalized 3/30/2021 with chest pain, dizziness. The pain was retrosternal. Improved with nitro. Troponin -ve. EKG with no ischemic changes.  Echo was reviewed. Hyperdynamic LV and collapsible IVC suggest she is more toward the dry side.     She stopped BB and Eliquis She was not feeling well with them     She had the event monitor with no evidence of a.fib    No DM, no TIA or CVA. Remote hx of tobacco, No known ROMAN.   Has sig FH of heart disease, and strokes.     Pertinent Hx FER, HTN, HLP, remote Tobacco abuse)      Prior cardiovascular  Hx  --------------------------------  - Stress MPI 4/7/2021  1. No convincing scintigraphic evidence of ischemia or infarct.  2. No focal wall motion abnormalities.  3. Left ventricular ejection fraction 72% during stress and 80% during rest.          - Echo 7/2024    Left Ventricle: The left ventricle is normal in size. Normal wall thickness. There is normal systolic function with a visually estimated ejection fraction of 60 - 65%. Grade I diastolic dysfunction.    Right Ventricle: Normal right ventricular cavity size. Systolic function is normal. TAPSE is 1.98 cm.    Aortic Valve: There is mild aortic valve sclerosis. Mildly restricted  motion. There is mild stenosis. Aortic valve area by VTI is 1.79 cm². Aortic valve peak velocity is 1.90 m/s. Mean gradient is 7 mmHg. The dimensionless index is 0.63.    The coronary sinus appears dilated.    Tricuspid Valve: There is mild regurgitation.    Pulmonic Valve: There is mild regurgitation.    Pulmonary Artery: The estimated pulmonary artery systolic pressure is 33 mmHg.    IVC/SVC: Intermediate venous pressure at 8 mmHg.     - Echo 3/31/2021   Hyperdynamic systolic function. The estimated ejection fraction is 75%  Normal right ventricular size with normal right ventricular systolic function.  Dilated coronary sinus. Consider elevated right atrial pressure or persistent left superior caval vein.  The coronary sinus appears dilated.  There is mild aortic valve stenosis.  Aortic valve area is 1.98 cm2; peak velocity is 2.17 m/s; mean gradient is 7 mmHg.  Right atrial enlargement.  Mild tricuspid regurgitation.  Mild to moderate pulmonic regurgitation.  Normal central venous pressure (3 mmHg).  The estimated PA systolic pressure is 24 mmHg.      - ECHO 6/2014  EF 60-65%    - Carotid U/S  20-39% stenosis b/l carotids    - Stress Echo 9/2016 Pharmacologic negative for ischemia     - Event monitor 12/17/2019 Sinus rhythm with 1st degree AV block, and a single episode of palpitations correlating with NSR with rare PACs.      Patient Active Problem List    Diagnosis Date Noted    Impaired functional mobility, balance, gait, and endurance 07/12/2024    Generalized weakness 07/12/2024    Cough 05/03/2024    Cervical spondylosis 09/26/2023    Lumbar spondylosis 09/26/2023    RASHIDA (generalized anxiety disorder) 09/12/2023    Gastroesophageal reflux disease 08/25/2023    Injury of left foot 08/25/2023    Leg edema, left 08/22/2023    Secondary and unspecified malignant neoplasm of axilla and upper limb lymph nodes 05/09/2023    Decreased functional mobility 03/13/2023    Decreased range of motion of lumbar spine  03/13/2023    Iron deficiency anemia due to chronic blood loss 02/07/2023    Drug-induced immunodeficiency 10/11/2022    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    Early dry stage nonexudative age-related macular degeneration of both eyes 09/20/2022    Osteopenia of multiple sites 08/03/2022    Use of anastrozole 08/03/2022    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, estrogen receptor positive 04/13/2022    Atherosclerosis of aorta 04/05/2022    Mild cognitive impairment with memory loss 12/30/2021    Depression     Rheumatoid arthritis     Greater trochanteric bursitis of left hip 09/16/2020    Vestibular disequilibrium, left 09/15/2020    Mucopurulent chronic bronchitis 08/17/2020    Left groin pain 06/22/2020    Left hip pain 06/22/2020    Chronic anticoagulation 04/15/2020    PAF (paroxysmal atrial fibrillation) 11/20/2019    Bilateral carotid artery disease 11/20/2019    Bilateral sacroiliitis 10/22/2019    Venous incompetence 09/30/2019    Senile nuclear sclerosis 08/01/2019    Osteoporosis, post-menopausal 02/21/2019     She refused Reclast      Restless legs 12/14/2018    Myofascial pain syndrome 09/07/2018    Chronic pain 08/30/2018    Sacroiliac joint dysfunction 08/13/2018    Brow ptosis 07/31/2018    Pain of right tibia 11/30/2015    S/P R knee surgery, TKA revision 09/08/2014    Internal derangement of right knee 08/20/2014    History of nickel allergy 07/16/2014    Fibromyalgia 04/17/2014    Retinal tear 10/08/2013    Posterior vitreous detachment, both eyes 09/17/2013    Retinal hemorrhage, left 09/17/2013    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    Migraine without aura and without status migrainosus, not intractable 07/25/2013    Paranoid schizophrenia 05/28/2013    Insomnia 05/28/2013    Extrapyramidal syndrome 05/28/2013    Essential hypertension 07/23/2012    Hyperlipidemia 07/23/2012    PSA (psoriatic arthritis) 07/19/2012    Low back pain 07/19/2012            Left Arm BP - Sittin/64  Reason for not completing BP on both arms: mastectomy        LAST HbA1c  Lab Results   Component Value Date    HGBA1C 5.3 2018       Lipid panel  Lab Results   Component Value Date    CHOL 140 2024    CHOL 141 2023    CHOL 153 2023     Lab Results   Component Value Date    HDL 87 (H) 2024    HDL 78 (H) 2023    HDL 90 (H) 2023     Lab Results   Component Value Date    LDLCALC 43.8 (L) 2024    LDLCALC 54.0 (L) 2023    LDLCALC 51.4 (L) 2023     Lab Results   Component Value Date    TRIG 46 2024    TRIG 45 2023    TRIG 58 2023     Lab Results   Component Value Date    CHOLHDL 62.1 (H) 2024    CHOLHDL 55.3 (H) 2023    CHOLHDL 58.8 (H) 2023            Review of Systems   Constitutional: Negative for chills and fever.   HENT:  Negative for hearing loss and nosebleeds.    Eyes:  Negative for blurred vision.   Cardiovascular:  Negative for chest pain and palpitations.   Respiratory:  Negative for hemoptysis and shortness of breath.    Hematologic/Lymphatic: Negative for bleeding problem.   Skin:  Negative for itching.   Musculoskeletal:  Negative for falls.   Gastrointestinal:  Negative for abdominal pain and hematochezia.   Genitourinary:  Negative for hematuria.   Neurological:  Positive for dizziness. Negative for loss of balance.   Psychiatric/Behavioral:  Negative for altered mental status and depression.        Objective:   Physical Exam  Constitutional:       Appearance: She is well-developed.   HENT:      Head: Normocephalic and atraumatic.   Eyes:      Conjunctiva/sclera: Conjunctivae normal.   Neck:      Vascular: Carotid bruit (b/l  Rt>Lt) present.   Cardiovascular:      Rate and Rhythm: Normal rate and regular rhythm.      Heart sounds: Normal heart sounds. No murmur heard.     No friction rub. No gallop.   Pulmonary:      Effort: Pulmonary effort is normal. No respiratory  distress.      Breath sounds: Normal breath sounds. No stridor. No wheezing.   Musculoskeletal:      Cervical back: Neck supple.   Skin:     General: Skin is warm and dry.   Neurological:      Mental Status: She is alert and oriented to person, place, and time.   Psychiatric:         Behavior: Behavior normal.         Assessment:     1. Atherosclerosis of aorta    2. Essential hypertension    3. Hyperlipidemia, unspecified hyperlipidemia type    4. Venous incompetence    5. PAF (paroxysmal atrial fibrillation)          Plan:   - Stress MPI with no ischemia   - Continue ASA/Statin   - Continue Imdur 30 mg,    - Continue current BP regimen   - Tobacco cessation counseling       - We have discussed NOAC and patient preferred to stay off  NOAC at this time. A.fib happened in the setting of URI, EM with no recurrent a.fib     - Hyponatremia noted. F/U with Nephrology.       Pertinent cardiac images and EKG reviewed independently.    Continue with current medical plan and lifestyle changes.  Return sooner for concerns or questions. If symptoms persist go to the ED  I have reviewed all pertinent data including patient's medical history in detail and updated the computerized patient record.     No orders of the defined types were placed in this encounter.      Follow up as scheduled.     She expressed verbal understanding and agreed with the plan    Patient's Medications   New Prescriptions    No medications on file   Previous Medications    ALBUTEROL (PROVENTIL) 2.5 MG /3 ML (0.083 %) NEBULIZER SOLUTION    Take 3 mLs (2.5 mg total) by nebulization every 6 (six) hours as needed for Wheezing. Rescue    ALBUTEROL (PROVENTIL/VENTOLIN HFA) 90 MCG/ACTUATION INHALER    USE 2 INHALATIONS BY MOUTH 4  TIMES DAILY AS NEEDED    AMLODIPINE (NORVASC) 5 MG TABLET    Take 1 tablet (5 mg total) by mouth 2 (two) times a day.    ASCORBIC ACID, VITAMIN C, (VITAMIN C) 500 MG TABLET    Take 500 mg by mouth once daily.    ASPIRIN (ECOTRIN) 81 MG  EC TABLET    Take 81 mg by mouth once daily.    ATORVASTATIN (LIPITOR) 80 MG TABLET    TAKE 1 TABLET(80 MG) BY MOUTH EVERY DAY    BACLOFEN (LIORESAL) 10 MG TABLET    1 tab po tid for muscle cramps.    BENZTROPINE (COGENTIN) 0.5 MG TABLET    TAKE 1 TABLET(0.5 MG) BY MOUTH TWICE DAILY    BUDESONIDE-FORMOTEROL 80-4.5 MCG (SYMBICORT) 80-4.5 MCG/ACTUATION HFAA    INHALE 2 PUFFS BY MOUTH TWICE DAILY    CICLOPIROX (PENLAC) 8 % SOLN    Apply topically nightly. Paint on affected nail(s) once per night, remove residue once per week with alcohol    EXEMESTANE (AROMASIN) 25 MG TABLET    Take 1 tablet (25 mg total) by mouth once daily.    FLUTICASONE (VERAMYST) 27.5 MCG/ACTUATION NASAL SPRAY    2 sprays by Nasal route as needed for Rhinitis.    FLUTICASONE PROPIONATE (FLONASE) 50 MCG/ACTUATION NASAL SPRAY    1 spray by Each Nostril route once daily.    FOLIC ACID (FOLVITE) 1 MG TABLET    Take 1 tablet (1,000 mcg total) by mouth once daily.    HYDROCODONE-ACETAMINOPHEN (NORCO) 5-325 MG PER TABLET    Take 1 tablet by mouth every 12 (twelve) hours as needed for Pain.    LIDOCAINE (LIDODERM) 5 %    Place 1 patch onto the skin once daily. Remove & Discard patch within 12 hours or as directed by MD    MELOXICAM (MOBIC) 7.5 MG TABLET    Take 1 tablet (7.5 mg total) by mouth once daily.    METHOTREXATE 2.5 MG TAB    TAKE 8 TABLETS BY MOUTH EVERY 7 DAYS. TAKE 4 TABLETS MONDAY MORNING AND TAKE 4 TABLETS MOND NIGHT ONLY    OMEGA-3 FATTY ACIDS/FISH OIL (FISH OIL-OMEGA-3 FATTY ACIDS) 300-1,000 MG CAPSULE    Take by mouth once daily.    OMEPRAZOLE (PRILOSEC) 20 MG CAPSULE    TAKE 1 CAPSULE(20 MG) BY MOUTH EVERY DAY    OMEPRAZOLE (PRILOSEC) 40 MG CAPSULE    Take 1 capsule (40 mg total) by mouth every morning.    PREGABALIN (LYRICA) 25 MG CAPSULE    Take 1 capsule (25 mg total) by mouth every evening.    RISPERIDONE (RISPERDAL) 2 MG TABLET    TAKE 1 TABLET(2 MG) BY MOUTH EVERY MORNING    RISPERIDONE (RISPERDAL) 4 MG TABLET    TAKE 1 TABLET(4  MG) BY MOUTH EVERY EVENING    VALSARTAN (DIOVAN) 80 MG TABLET    1 tab po q hs    VENLAFAXINE (EFFEXOR-XR) 75 MG 24 HR CAPSULE    Take 1 capsule (75 mg total) by mouth once daily.    VITAMIN D (VITAMIN D3) 1000 UNITS TAB    Take 1,000 Units by mouth once daily.    VITAMIN E 200 UNIT CAPSULE    Take 200 Units by mouth once daily.    ZINC GLUCONATE 50 MG TABLET    Take 50 mg by mouth once daily.   Modified Medications    Modified Medication Previous Medication    ISOSORBIDE MONONITRATE (IMDUR) 30 MG 24 HR TABLET isosorbide mononitrate (IMDUR) 30 MG 24 hr tablet       Take 1 tablet (30 mg total) by mouth once daily.    TAKE 1 TABLET(30 MG) BY MOUTH DAILY   Discontinued Medications    No medications on file

## 2024-09-03 NOTE — TELEPHONE ENCOUNTER
----- Message from Nani Storm DO sent at 2024 11:39 AM CDT -----  Regarding: Order for PHUONG HAZEL    Patient Name: PHUONG HAZEL([id number])  Sex: Female  : 1955      PCP: MILLIE TELLO    Center: [facility] CENTRAL BILLING OFFICE     Types of orders made on 2024: Medications, Procedure Request    Order Date:2024  Ordering User:NANI STORM [[id number]  Encounter Provider:Nani Storm DO [[id number]]    Authorizing Provider: Nani Storm DO [[id number]]  Department:[facility] PAIN MANAGEMENT[[id number]]    Common Order Information  Procedure -> Epidural Injection (specify level) Cmt: Caudal    Pre-op Diagnosis -> Lumbar radiculopathy       -> DDD (degenerative disc disease), lumbar     Order Specific Information  Order: Procedure Order to Pain Management [Custom: [id number]]  Order #:          [id number]: 1 FUTURE    Prior  ity: Routine  Class: Clinic Performed    Future Order Information      Expires on:2025            Expected by:2024                   Associated Diagnoses      M54.16 Lumbar radiculopathy      M51.36 DDD (degenerative disc disease), lumbar      M54.17 Lumbosacral radiculopathy      Physician -> gelter         Is patient on anti-coagulants? Cmt: ASA        Facility Name: -> [place]           Priority:   NRoutine  Class: Clinic Performed    Future Order Information      Expires on:2025            Expected by:2024                   Associated Diagnoses      M54.16 Lumbar radiculopathy      M51.36 DDD (degenerative disc disease), lumbar      M54.17 Lumbosacral radiculopathy      Procedure -> Epidural Injection (specify level) Cmt: Caudal        Physician -> gelter         Is patient on anti-coagulants? Cmt: ASA          Pre-op Diagnosis -> Lumbar radiculopathy           -> DDD (degenerative disc disease), lumbar         Facility Name: -> [place]

## 2024-09-04 NOTE — TELEPHONE ENCOUNTER
----- Message from Nani Storm DO sent at 2024 11:39 AM CDT -----  Regarding: Order for PHUONG HAZEL    Patient Name: PHUONG HAZEL([id number])  Sex: Female  : 1955      PCP: MILLIE TELLO    Center: [facility] CENTRAL BILLING OFFICE     Types of orders made on 2024: Medications, Procedure Request    Order Date:2024  Ordering User:NANI STORM [[id number]]  Encounter Provider:Nani Storm DO [[id number]]    Authorizing Provider: Nani Storm DO [[id number]]  Department:[facility] PAIN MANAGEMENT[[id number]]    Common Order Information  Procedure -> Epidural Injection (specify level) Cmt: Caudal    Pre-op Diagnosis -> Lumbar radiculopathy       -> DDD (degenerative disc disease), lumbar     Order Specific Information  Order: Procedure Order to Pain Management [Custom: [id number]]  Order #:          [id number]: 1 FUTURE    Prior  ity: Routine  Class: Clinic Performed    Future Order Information      Expires on:2025            Expected by:2024                   Associated Diagnoses      M54.16 Lumbar radiculopathy      M51.36 DDD (degenerative disc disease), lumbar      M54.17 Lumbosacral radiculopathy      Physician -> gelter         Is patient on anti-coagulants? Cmt: ASA        Facility Name: -> [place]           Priority:   NRoutine  Class: Clinic Performed    Future Order Information      Expires on:2025            Expected by:2024                   Associated Diagnoses      M54.16 Lumbar radiculopathy      M51.36 DDD (degenerative disc disease), lumbar      M54.17 Lumbosacral radiculopathy      Procedure -> Epidural Injection (specify level) Cmt: Caudal        Physician -> gelter         Is patient on anti-coagulants? Cmt: ASA          Pre-op Diagnosis -> Lumbar radiculopathy           -> DDD (degenerative disc disease), lumbar         Facility Name: -> [place]

## 2024-09-06 ENCOUNTER — TELEPHONE (OUTPATIENT)
Dept: PAIN MEDICINE | Facility: CLINIC | Age: 69
End: 2024-09-06
Payer: MEDICARE

## 2024-09-06 NOTE — TELEPHONE ENCOUNTER
----- Message from Ramsey Jaramillo MA sent at 9/6/2024 10:32 AM CDT -----  Regarding: FW: Procedure time  Contact: [phone]  Good morning, please advise....  ----- Message -----  From: Lizy Godoy  Sent: 9/6/2024   9:33 AM CDT  To: [facility] Staff  Subject: Procedure time                                   Type:  Needs Medical Advice    Who Called: Bhavna Huggins called requesting her arrival time for procedure 10:30am or later due to her ride schedule   She will not have a ride earlier and this is a 45 minute drive  Would the patient rather a call back or a response via [facility]? Call back  Best Call Back Number: [phone]    Additional Information:

## 2024-09-10 ENCOUNTER — TELEPHONE (OUTPATIENT)
Dept: PAIN MEDICINE | Facility: CLINIC | Age: 69
End: 2024-09-10
Payer: MEDICARE

## 2024-09-13 ENCOUNTER — OFFICE VISIT (OUTPATIENT)
Dept: DERMATOLOGY | Facility: CLINIC | Age: 69
End: 2024-09-13
Payer: MEDICARE

## 2024-09-13 VITALS — BODY MASS INDEX: 21.09 KG/M2 | WEIGHT: 108 LBS

## 2024-09-13 DIAGNOSIS — Z85.828 HISTORY OF SKIN CANCER: ICD-10-CM

## 2024-09-13 DIAGNOSIS — L81.4 LENTIGINES: ICD-10-CM

## 2024-09-13 DIAGNOSIS — L57.0 MULTIPLE ACTINIC KERATOSES: Primary | ICD-10-CM

## 2024-09-13 PROCEDURE — 99999 PR PBB SHADOW E&M-EST. PATIENT-LVL II: CPT | Mod: PBBFAC,,, | Performed by: DERMATOLOGY

## 2024-09-13 NOTE — PROGRESS NOTES
Subjective:      Patient ID:  Bhavna Landry is a 69 y.o. female who presents for   Chief Complaint   Patient presents with    Follow-up     Skin check     Would like skin check, few spots on forehead and left arm.     Follow-up - Initial  Affected locations: face, left hand, right hand, right arm and left arm  Signs / symptoms: asymptomatic      Review of Systems   Constitutional:  Negative for fever, chills, weight loss, weight gain, fatigue, night sweats and malaise.   Skin:  Positive for wears hat. Negative for daily sunscreen use and activity-related sunscreen use.   Hematologic/Lymphatic: Bruises/bleeds easily.       Objective:   Physical Exam   Constitutional: She appears well-developed and well-nourished. No distress.   Neurological: She is alert and oriented to person, place, and time. She is not disoriented.   Psychiatric: She has a normal mood and affect.   Skin:   Areas Examined (abnormalities noted in diagram):   Scalp / Hair Palpated and Inspected  Head / Face Inspection Performed  Neck Inspection Performed  Chest / Axilla Inspection Performed  Abdomen Inspection Performed  Back Inspection Performed  RUE Inspected  LUE Inspection Performed  Nails and Digits Inspection Performed                     Diagram Legend     Erythematous scaling macule/papule c/w actinic keratosis       Vascular papule c/w angioma      Pigmented verrucoid papule/plaque c/w seborrheic keratosis      Yellow umbilicated papule c/w sebaceous hyperplasia      Irregularly shaped tan macule c/w lentigo     1-2 mm smooth white papules consistent with Milia      Movable subcutaneous cyst with punctum c/w epidermal inclusion cyst      Subcutaneous movable cyst c/w pilar cyst      Firm pink to brown papule c/w dermatofibroma      Pedunculated fleshy papule(s) c/w skin tag(s)      Evenly pigmented macule c/w junctional nevus     Mildly variegated pigmented, slightly irregular-bordered macule c/w mildly atypical nevus      Flesh colored  "to evenly pigmented papule c/w intradermal nevus       Pink pearly papule/plaque c/w basal cell carcinoma      Erythematous hyperkeratotic cursted plaque c/w SCC      Surgical scar with no sign of skin cancer recurrence      Open and closed comedones      Inflammatory papules and pustules      Verrucoid papule consistent consistent with wart     Erythematous eczematous patches and plaques     Dystrophic onycholytic nail with subungual debris c/w onychomycosis     Umbilicated papule    Erythematous-base heme-crusted tan verrucoid plaque consistent with inflamed seborrheic keratosis     Erythematous Silvery Scaling Plaque c/w Psoriasis     See annotation      Assessment / Plan:        Multiple actinic keratoses   Cryosurgery Procedure Note    Verbal consent from the patient is obtained and the patient is aware of the precancerous quality and need for treatment of these lesions. Liquid nitrogen cryosurgery is applied to the 6 actinic keratoses, as detailed in the physical exam, to produce a freeze injury.     Lentigines  The "ABCD" rules to observe pigmented lesions were reviewed.      History of skin cancer  Comments:  scc right arm removed mohs surgery  Area(s) of previous NMSC evaluated with no signs of recurrence.    Upper body skin examination performed today including at least 6 points as noted in physical examination. No lesions suspicious for malignancy noted.    Recommend daily sun protection/avoidance and use of at least SPF 30, broad spectrum sunscreen (OTC drug).                Follow up in about 6 months (around 3/13/2025).  "

## 2024-09-16 NOTE — PRE-PROCEDURE INSTRUCTIONS
Pt states she read the portal msg of instructions and that she has been through this many times.  Has not questions or concerns.    The following was sent to pt portal:  Dear Bhavna ,     Please read over the following pre-procedure instructions in it's entirety as there is helpful information here to get you well prepared for your upcoming procedure.              You are scheduled for a procedure with Dr. Storm on 9/18/2024.      Your scheduled arrival time is 10:45 am.  This arrival time is roughly 1 hour before your anticipated procedure time to allow sufficient time for pre-op..     Please wear comfortable clothes. You will be placed in a gown for your procedure.  Please do not wear a dress.  This procedure will take place at the [facility] at the corner of [facility] and [facility]  It is in the [place] next to [facility].  The address is:     [address] [place] [address].  [place] [id number]     After entering the building, you will proceed to the second floor where you can check in with registration. You should take any medications that you routinely take for blood pressure, heart medications, thyroid, cholesterol, etc.      The fasting restrictions are dependent on whether or not you are receiving sedation.  Sedation is not available for all procedures.      Your fasting instructions are as follow:  IV sedation. You should not eat for 8 hours and can only drink clear liquids (water or black coffee without cream/sugar) up until 2 hours before your scheduled time.  You CANNOT drive yourself and must have a .     If you are on blood thinners, you need to follow the anticoagulation instructions that had been discussed previously.  You should only stop the blood thinners if it was approved by your primary care physician or your cardiologist.  In the event that you are not able to stop your blood thinners, a blood thinner was not listed on your medication list, or we  were not able to get clearance from your cardiologist, then the procedure may have to be postponed/canceled.      IF you were told to stop your blood thinners, this is how long you should generally hold some of the more common ones.  Remember that stopping blood thinners is only necessary for certain procedures. If you are unsure of your instructions, please call us.   Aspirin - ok to take  Plavix/Clopidogrel - 7 days  Warfarin / Coumadin - 5 days  Eliquis - 3 days  Pradaxa/Dabigatran - 4 days  Xarelto/Rivaroxaban - 3 days     If you are a diabetic, do not take your medication if you will be fasting, but bring it with you. Please plan on being here for roughly 3 hours.     Please call us if you have been sick (running fever, having any flu-like symptoms) or have been taking ANTIBIOTICS in the past 2 weeks or had any outpatient procedures other than with us (colonoscopy, endoscopy, OBGYN, dental, etc.).     If you have been previously COVID positive, you will need to hold off on your procedure until you are symptom free for 10 days. If you did not have any symptoms, you can have your procedure 10 days from your positive test result.       On the morning of your procedure:  *HOLD ALL VITAMINS, MINERALS, HERBS (INCLUDING HERBAL TEAS) AND SUPPLEMENTS  *SHOWER WITH ANTIBACTERIAL SOAP (EX. DIAL) NIGHT BEFORE AND MORNING OF PROCEDURE  *DO NOT APPLY ANY LOTIONS, OILS, POWDERS, PERFUME/COLOGNE, OINTMENTS, GELS, CREAMS, MAKEUP OR DEODORANT TO YOUR SKIN MORNING OF PROCEDURE  *LEAVE JEWELRY AND ANY VALUABLES AT HOME  *WEAR LOOSE COMFORTABLE CLOTHING (PREFERABLY A BUTTON UP SHIRT)     Please reply to this message as receipt of delivery.           Thank you,  [facility]  Camelia, LPN [facility]  Pre-Admit

## 2024-09-17 ENCOUNTER — TELEPHONE (OUTPATIENT)
Dept: PODIATRY | Facility: CLINIC | Age: 69
End: 2024-09-17
Payer: MEDICARE

## 2024-09-18 ENCOUNTER — HOSPITAL ENCOUNTER (OUTPATIENT)
Facility: HOSPITAL | Age: 69
Discharge: HOME | End: 2024-09-18
Attending: STUDENT IN AN ORGANIZED HEALTH CARE EDUCATION/TRAINING PROGRAM | Admitting: STUDENT IN AN ORGANIZED HEALTH CARE EDUCATION/TRAINING PROGRAM
Payer: MEDICARE

## 2024-09-18 VITALS
WEIGHT: 99 LBS | HEIGHT: 62 IN | TEMPERATURE: 99 F | HEART RATE: 67 BPM | RESPIRATION RATE: 16 BRPM | BODY MASS INDEX: 18.22 KG/M2 | DIASTOLIC BLOOD PRESSURE: 82 MMHG | SYSTOLIC BLOOD PRESSURE: 186 MMHG | OXYGEN SATURATION: 95 %

## 2024-09-18 DIAGNOSIS — M51.36 DDD (DEGENERATIVE DISC DISEASE), LUMBAR: Primary | ICD-10-CM

## 2024-09-18 DIAGNOSIS — M54.16 LUMBAR RADICULOPATHY: ICD-10-CM

## 2024-09-18 DIAGNOSIS — G89.29 CHRONIC PAIN: ICD-10-CM

## 2024-09-18 PROCEDURE — 25000003 PHARM REV CODE 250: Performed by: STUDENT IN AN ORGANIZED HEALTH CARE EDUCATION/TRAINING PROGRAM

## 2024-09-18 PROCEDURE — 63600175 PHARM REV CODE 636 W HCPCS: Performed by: STUDENT IN AN ORGANIZED HEALTH CARE EDUCATION/TRAINING PROGRAM

## 2024-09-18 PROCEDURE — 25500020 PHARM REV CODE 255: Performed by: STUDENT IN AN ORGANIZED HEALTH CARE EDUCATION/TRAINING PROGRAM

## 2024-09-18 PROCEDURE — 62323 NJX INTERLAMINAR LMBR/SAC: CPT | Performed by: STUDENT IN AN ORGANIZED HEALTH CARE EDUCATION/TRAINING PROGRAM

## 2024-09-18 PROCEDURE — 62323 NJX INTERLAMINAR LMBR/SAC: CPT | Mod: ,,, | Performed by: STUDENT IN AN ORGANIZED HEALTH CARE EDUCATION/TRAINING PROGRAM

## 2024-09-18 RX ORDER — FENTANYL CITRATE 50 UG/ML
INJECTION, SOLUTION INTRAMUSCULAR; INTRAVENOUS
Status: DISCONTINUED | OUTPATIENT
Start: 2024-09-18 | End: 2024-09-18 | Stop reason: HOSPADM

## 2024-09-18 RX ORDER — DEXAMETHASONE SODIUM PHOSPHATE 10 MG/ML
INJECTION INTRAMUSCULAR; INTRAVENOUS
Status: DISCONTINUED | OUTPATIENT
Start: 2024-09-18 | End: 2024-09-18 | Stop reason: HOSPADM

## 2024-09-18 RX ORDER — LIDOCAINE HYDROCHLORIDE 20 MG/ML
INJECTION, SOLUTION EPIDURAL; INFILTRATION; INTRACAUDAL; PERINEURAL
Status: DISCONTINUED | OUTPATIENT
Start: 2024-09-18 | End: 2024-09-18 | Stop reason: HOSPADM

## 2024-09-18 RX ORDER — MIDAZOLAM HYDROCHLORIDE 1 MG/ML
INJECTION INTRAMUSCULAR; INTRAVENOUS
Status: DISCONTINUED | OUTPATIENT
Start: 2024-09-18 | End: 2024-09-18 | Stop reason: HOSPADM

## 2024-09-18 RX ORDER — LIDOCAINE HYDROCHLORIDE 10 MG/ML
INJECTION, SOLUTION EPIDURAL; INFILTRATION; INTRACAUDAL; PERINEURAL
Status: DISCONTINUED | OUTPATIENT
Start: 2024-09-18 | End: 2024-09-18 | Stop reason: HOSPADM

## 2024-09-18 RX ORDER — SODIUM CHLORIDE 9 MG/ML
INJECTION, SOLUTION INTRAVENOUS CONTINUOUS
Status: DISCONTINUED | OUTPATIENT
Start: 2024-09-18 | End: 2024-09-18 | Stop reason: HOSPADM

## 2024-09-18 NOTE — DISCHARGE INSTRUCTIONS
Home Care Instructions Pain Management:    1.  DIET:    You may resume your normal diet today.    2.  BATHING:    You may shower with luke warm water.    3.  DRESSING:    You may remove your bandage today.    4.  ACTIVITY LEVEL:      You may resume your normal activities 24 hours after your procedure.    5.  MEDICATIONS:    You may resume your normal medications today.    6.  SPECIAL INSTRUCTIONS:    No heat to the injection site for 24 hours including bath or shower, heating pad, moist heat or hot tubs.    Use an ice pack to the injection site for any pain or discomfort.  Apply ice packs for 20 minute intervals as needed.    If you have received any sedatives by mouth today, you can not drive for 12 hours.    If you have received sedation through an IV, you can not drive for 24 hours.    PLEASE CALL YOUR DOCTOR FOR THE FOLLOWIN.  Redness or swelling around the injection site.  2.  Fever of 101 degrees.  3.  Drainage (pus) from the injection site.  4.  For any continuous bleeding (some dried blood over the incision is normal.)    FOR EMERGENCIES:    If any unusual problems or difficulties occur during clinic hours, call [phone] or dial [phone].    Follow up with with your physician in 2-3 weeks.

## 2024-09-18 NOTE — PLAN OF CARE
Patient discharge instructions reviewed, patient verbalized understanding.  Tolerated IV fluids well, denies nausea, IV removed, cath tip intact.  Escorted to family via wheelchair.  No concerns voiced.

## 2024-09-18 NOTE — OP NOTE
Caudal Epidural Steroid Injection without Catheter under Fluoroscopic Guidance    The procedure, risks, benefits, and options were discussed with the patient. There are no contraindications to the procedure. The patent expressed understanding and agreed to the procedure. Informed written consent was obtained prior to the start of the procedure and can be found in the patient's chart.    PATIENT NAME: Bhavna Landry   MRN: [id number]     DATE OF PROCEDURE: 09/18/2024    PROCEDURE: Caudal Epidural Steroid Injection under Fluoroscopic Guidance    PRE-OP DIAGNOSIS: Lumbar radiculopathy [M54.16]  DDD (degenerative disc disease), lumbar [M51.36]  Lumbosacral radiculopathy [M54.17] Lumbar radiculopathy [M54.16]    POST-OP DIAGNOSIS: Same    PHYSICIAN: Maile Storm DO    ASSISTANTS: Reinier Robles MD  [facility]     MEDICATIONS INJECTED: Preservative-free Decadron 10mg with 4cc of Lidocaine 1% MPF and preservative free NS    LOCAL ANESTHETIC INJECTED: Xylocaine 2%     SEDATION: Versed 2mg and Fentanyl 50mcg                                                                                                                                                                                     Conscious sedation ordered by FRANDYD. Patient re-evaluation prior to administration of conscious sedation. No changes noted in patient's status from initial evaluation. The patient's vital signs were monitored by RN and patient remained hemodynamically stable throughout the procedure.  No case tracking events are documented in the log.    ESTIMATED BLOOD LOSS: None    COMPLICATIONS: None    TECHNIQUE: Time-out was performed to identify the patient and procedure to be performed. With the patient laying in a prone position, the surgical area was prepped and draped in the usual sterile fashion using ChloraPrep and a fenestrated drape. The injection site was determined under fluoroscopy guidance. Skin anesthesia was achieved by injecting  Lidocaine 2% over the injection site. The sacrum and sacral cornua were then approached with a 25 gauge 1.5 inch hypodermic needle that was introduced and advanced into the sacral hiatus under fluoroscopic guidance with AP, lateral and/or contralateral oblique imaging. After the needle passed through the sacrococcygeal ligament, the needle angle was lowered and the needle was advanced 1 cm. After negative aspiration of blood or CSF, and no evidence of paraesthesias, the catheter was threaded through the needle into the epidural space using live fluoroscopy, contrast dye Omnipaque (300mg/mL) was injected to confirm placement and there was no vascular runoff. Fluoroscopic imaging in the AP and lateral views revealed a clear outline of the spinal nerve with proximal spread of agent through the caudal epidural space. Then 10 mL of the medication mixture listed above was injected slowly. Displacement of the radio opaque contrast after injection of the medication confirmed that the medication went into the area of the transforaminal spaces. The needles were removed and bleeding was nil. A sterile dressing was applied. No specimens collected. The patient tolerated the procedure well.     The patient was monitored after the procedure in the recovery area. They were given post-procedure and discharge instructions to follow at home. The patient was discharged in a stable condition.    Reinier Robles MD     I reviewed and edited the fellow's note. I conducted my own interview and physical examination. I agree with the findings. I was present and supervising all critical portions of the procedure.

## 2024-09-18 NOTE — DISCHARGE SUMMARY
Discharge Note  Short Stay      SUMMARY     Admit Date: 9/18/2024    Attending Physician: Maile Storm      Discharge Physician: Maile Storm      Discharge Date: 9/18/2024 12:09 PM    Procedure(s) (LRB):  Caudal SOPHY (N/A)    Final Diagnosis: Lumbar radiculopathy [M54.16]  DDD (degenerative disc disease), lumbar [M51.36]  Lumbosacral radiculopathy [M54.17]    Disposition: Home or self care    Patient Instructions:   Current Discharge Medication List        CONTINUE these medications which have NOT CHANGED    Details   albuterol (PROVENTIL) 2.5 mg /3 mL (0.083 %) nebulizer solution Take 3 mLs (2.5 mg total) by nebulization every 6 (six) hours as needed for Wheezing. Rescue  Qty: 90 each, Refills: 2    Associated Diagnoses: Chronic bronchitis, unspecified chronic bronchitis type      albuterol (PROVENTIL/VENTOLIN HFA) 90 mcg/actuation inhaler USE 2 INHALATIONS BY MOUTH 4  TIMES DAILY AS NEEDED  Qty: 34 g, Refills: 3    Comments: Please send a replace/new response with 90-Day Supply if appropriate to maximize member benefit. Requesting 1 year supply.      amLODIPine (NORVASC) 5 MG tablet Take 1 tablet (5 mg total) by mouth 2 (two) times a day.  Qty: 180 tablet, Refills: 1    Comments: .  Associated Diagnoses: Essential hypertension      ascorbic acid, vitamin C, (VITAMIN C) 500 MG tablet Take 500 mg by mouth once daily.      aspirin (ECOTRIN) 81 MG EC tablet Take 81 mg by mouth once daily.      baclofen (LIORESAL) 10 MG tablet 1 tab po tid for muscle cramps.  Qty: 60 tablet, Refills: 1      benztropine (COGENTIN) 0.5 MG tablet TAKE 1 TABLET(0.5 MG) BY MOUTH TWICE DAILY  Qty: 180 tablet, Refills: 3    Associated Diagnoses: Extrapyramidal symptom      budesonide-formoterol 80-4.5 mcg (SYMBICORT) 80-4.5 mcg/actuation HFAA INHALE 2 PUFFS BY MOUTH TWICE DAILY  Qty: 30.6 g, Refills: 2    Associated Diagnoses: Mucopurulent chronic bronchitis      exemestane (AROMASIN) 25 mg tablet Take 1 tablet (25 mg total) by  mouth once daily.  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      fluticasone propionate (FLONASE) 50 mcg/actuation nasal spray 1 spray by Each Nostril route once daily.      folic acid (FOLVITE) 1 MG tablet Take 1 tablet (1,000 mcg total) by mouth once daily.  Qty: 90 tablet, Refills: 3    Comments: Requesting 1 year supply  Associated Diagnoses: Psoriatic arthritis      HYDROcodone-acetaminophen (NORCO) 5-325 mg per tablet Take 1 tablet by mouth every 12 (twelve) hours as needed for Pain.  Qty: 60 tablet, Refills: 0    Comments: Quantity prescribed more than 7 day supply? Yes, quantity medically necessary  Associated Diagnoses: Chronic low back pain without sciatica, unspecified back pain laterality      isosorbide mononitrate (IMDUR) 30 MG 24 hr tablet Take 1 tablet (30 mg total) by mouth once daily.  Qty: 90 tablet, Refills: 3      meloxicam (MOBIC) 7.5 MG tablet Take 1 tablet (7.5 mg total) by mouth once daily.  Qty: 14 tablet, Refills: 0    Associated Diagnoses: Juvenile idiopathic scoliosis of thoracolumbar region; Lumbar radiculopathy      methotrexate 2.5 MG Tab TAKE 8 TABLETS BY MOUTH EVERY 7 DAYS. TAKE 4 TABLETS MONDAY MORNING AND TAKE 4 TABLETS MOND NIGHT ONLY  Qty: 144 tablet, Refills: 0    Comments: **Patient requests 90 days supply**  Associated Diagnoses: Psoriatic arthritis      !! omeprazole (PRILOSEC) 40 MG capsule Take 1 capsule (40 mg total) by mouth every morning.  Qty: 90 capsule, Refills: 3    Associated Diagnoses: Gastroesophageal reflux disease, unspecified whether esophagitis present      pregabalin (LYRICA) 25 MG capsule Take 1 capsule (25 mg total) by mouth every evening.  Qty: 30 capsule, Refills: 0      !! risperiDONE (RISPERDAL) 2 MG tablet TAKE 1 TABLET(2 MG) BY MOUTH EVERY  MORNING  Qty: 90 tablet, Refills: 3    Associated Diagnoses: Paranoid schizophrenia      valsartan (DIOVAN) 80 MG tablet 1 tab po q hs  Qty: 90 tablet, Refills: 0    Comments: .      venlafaxine (EFFEXOR-XR) 75 MG 24 hr capsule Take 1 capsule (75 mg total) by mouth once daily.  Qty: 90 capsule, Refills: 3    Comments: Patient is now taking 75mg daily.  Please cancel all other prescriptions.  Associated Diagnoses: Anxiety; Recurrent major depressive disorder, in full remission      vitamin D (VITAMIN D3) 1000 units Tab Take 1,000 Units by mouth once daily.      vitamin E 200 UNIT capsule Take 200 Units by mouth once daily.      zinc gluconate 50 mg tablet Take 50 mg by mouth once daily.      atorvastatin (LIPITOR) 80 MG tablet TAKE 1 TABLET(80 MG) BY MOUTH EVERY DAY  Qty: 90 tablet, Refills: 0    Associated Diagnoses: Hyperlipidemia, unspecified hyperlipidemia type      ciclopirox (PENLAC) 8 % Soln Apply topically nightly. Paint on affected nail(s) once per night, remove residue once per week with alcohol  Qty: 6.6 mL, Refills: 3    Associated Diagnoses: Tinea unguium      LIDOcaine (LIDODERM) 5 % Place 1 patch onto the skin once daily. Remove & Discard patch within 12 hours or as directed by MD  Qty: 30 patch, Refills: 0    Associated Diagnoses: Bilateral sacroiliitis      omega-3 fatty acids/fish oil (FISH OIL-OMEGA-3 FATTY ACIDS) 300-1,000 mg capsule Take by mouth once daily.      !! omeprazole (PRILOSEC) 20 MG capsule TAKE 1 CAPSULE(20 MG) BY MOUTH EVERY DAY  Qty: 30 capsule, Refills: 11    Associated Diagnoses: Gastroesophageal reflux disease, unspecified whether esophagitis present; Erosive gastropathy      !! risperiDONE (RISPERDAL) 4 MG tablet TAKE 1 TABLET(4 MG) BY MOUTH EVERY EVENING  Qty: 90 tablet, Refills: 3    Associated Diagnoses: Paranoid schizophrenia       !! - Potential duplicate medications found. Please discuss with provider.              Discharge Diagnosis: Lumbar radiculopathy [M54.16]  DDD  (degenerative disc disease), lumbar [M51.36]  Lumbosacral radiculopathy [M54.17]  Condition on Discharge: Stable with no complications to procedure   Diet on Discharge: Same as before.  Activity: as per instruction sheet.  Discharge to: Home with a responsible adult.  Follow up: 2-4 weeks       Please call my office or pager at [phone] if experienced any weakness or loss of sensation, fever > 101.5, pain uncontrolled with oral medications, persistent nausea/vomiting/or diarrhea, redness or drainage from the incisions, or any other worrisome concerns. If physician on call was not reached or could not communicate with our office for any reason please go to the nearest emergency department

## 2024-09-19 DIAGNOSIS — L40.50 PSORIATIC ARTHRITIS: ICD-10-CM

## 2024-09-19 RX ORDER — METHOTREXATE 2.5 MG/1
20 TABLET ORAL
Qty: 96 TABLET | Refills: 0 | Status: SHIPPED | OUTPATIENT
Start: 2024-09-19

## 2024-09-23 ENCOUNTER — TELEPHONE (OUTPATIENT)
Dept: ALLERGY | Facility: CLINIC | Age: 69
End: 2024-09-23
Payer: MEDICARE

## 2024-09-23 ENCOUNTER — PATIENT MESSAGE (OUTPATIENT)
Dept: INTERNAL MEDICINE | Facility: CLINIC | Age: 69
End: 2024-09-23
Payer: MEDICARE

## 2024-09-23 ENCOUNTER — TELEPHONE (OUTPATIENT)
Dept: HEMATOLOGY/ONCOLOGY | Facility: CLINIC | Age: 69
End: 2024-09-23
Payer: MEDICARE

## 2024-09-23 DIAGNOSIS — Z86.39 HISTORY OF IRON DEFICIENCY: Primary | ICD-10-CM

## 2024-09-23 NOTE — TELEPHONE ENCOUNTER
Unable to get in contact with patient.  Subscriber is not available.  Unable to leave a voice message.   I will tried again later on today.

## 2024-09-23 NOTE — TELEPHONE ENCOUNTER
----- Message from Brianna Navarro sent at 9/23/2024  8:40 AM CDT -----  Regarding: Order Advise  Contact: [phone]  Bhavna Landry calling regarding Patient Advice (message) for # pt is calling to speak with nurse regarding if this provider had put in orders for her iron she states her iron is low pls advise       Pt is asking if provider wants her to do a MRI for bone density

## 2024-09-23 NOTE — TELEPHONE ENCOUNTER
Spoke with patient about getting iron lab request as per Dr Benítez thru her Oncologist or PCP.  Patient verbalized understanding on instruction given.

## 2024-09-23 NOTE — TELEPHONE ENCOUNTER
Left a voice message with patient's daughter's phone number Mrs. Huggins to have patient to call office at her convenience.

## 2024-09-23 NOTE — TELEPHONE ENCOUNTER
----- Message from Dora Lira sent at 9/23/2024  8:12 AM CDT -----  Type:  Patient Requesting Order    Who CalledPHUONG HAZEL [[id number]] Telephone Information:  Mobile          [phone]          Does the patient already have the specialty appointment scheduled? no    If yes, what is the date of that appointment?:no      Additional Information: pt is requesting a blood test to check her Iron. She is feeling very fatigued . Please advise

## 2024-09-23 NOTE — TELEPHONE ENCOUNTER
"----- Message from Diana Rashid sent at 9/23/2024  8:25 AM CDT -----  Regarding: pt advice  Contact: [phone]  .Name Of Caller: Self     Contact Preference?: [phone]     What is the nature of the call?: Returning call to jamie Madera call     Additional Notes:  "Thank you for all that you do for our patients"  "

## 2024-09-24 NOTE — TELEPHONE ENCOUNTER
Pt requesting order to check her irons levels, reports feeling fatigued again    Ferritin and Iron & TIBC drawn 6/3 - told that levels look great, no anemia currently    LOV 8/22/2024

## 2024-09-25 ENCOUNTER — PATIENT MESSAGE (OUTPATIENT)
Dept: HEMATOLOGY/ONCOLOGY | Facility: CLINIC | Age: 69
End: 2024-09-25
Payer: MEDICARE

## 2024-09-25 DIAGNOSIS — Z00.00 ENCOUNTER FOR MEDICARE ANNUAL WELLNESS EXAM: ICD-10-CM

## 2024-09-27 ENCOUNTER — OFFICE VISIT (OUTPATIENT)
Dept: ALLERGY | Facility: CLINIC | Age: 69
End: 2024-09-27
Payer: MEDICARE

## 2024-09-27 ENCOUNTER — LAB VISIT (OUTPATIENT)
Dept: LAB | Facility: HOSPITAL | Age: 69
End: 2024-09-27
Attending: INTERNAL MEDICINE
Payer: MEDICARE

## 2024-09-27 VITALS
OXYGEN SATURATION: 97 % | HEART RATE: 75 BPM | SYSTOLIC BLOOD PRESSURE: 120 MMHG | WEIGHT: 100.06 LBS | BODY MASS INDEX: 18.41 KG/M2 | HEIGHT: 62 IN | DIASTOLIC BLOOD PRESSURE: 72 MMHG

## 2024-09-27 DIAGNOSIS — J31.0 RHINITIS, UNSPECIFIED TYPE: ICD-10-CM

## 2024-09-27 DIAGNOSIS — R59.1 LYMPHADENOPATHY: ICD-10-CM

## 2024-09-27 DIAGNOSIS — R06.2 WHEEZING: ICD-10-CM

## 2024-09-27 DIAGNOSIS — I10 ESSENTIAL HYPERTENSION: ICD-10-CM

## 2024-09-27 DIAGNOSIS — D83.9 CVID (COMMON VARIABLE IMMUNODEFICIENCY): Primary | ICD-10-CM

## 2024-09-27 DIAGNOSIS — Z86.19 HISTORY OF RECURRENT INFECTION: ICD-10-CM

## 2024-09-27 DIAGNOSIS — Z86.39 HISTORY OF IRON DEFICIENCY: ICD-10-CM

## 2024-09-27 LAB
FERRITIN SERPL-MCNC: 96 NG/ML (ref 20–300)
IRON SERPL-MCNC: 39 UG/DL (ref 30–160)
SATURATED IRON: 11 % (ref 20–50)
TOTAL IRON BINDING CAPACITY: 371 UG/DL (ref 250–450)
TRANSFERRIN SERPL-MCNC: 251 MG/DL (ref 200–375)

## 2024-09-27 PROCEDURE — 99999 PR PBB SHADOW E&M-EST. PATIENT-LVL V: CPT | Mod: PBBFAC,,, | Performed by: STUDENT IN AN ORGANIZED HEALTH CARE EDUCATION/TRAINING PROGRAM

## 2024-09-27 PROCEDURE — 36415 COLL VENOUS BLD VENIPUNCTURE: CPT | Mod: PO | Performed by: INTERNAL MEDICINE

## 2024-09-27 PROCEDURE — 83540 ASSAY OF IRON: CPT | Performed by: INTERNAL MEDICINE

## 2024-09-27 PROCEDURE — 82728 ASSAY OF FERRITIN: CPT | Performed by: INTERNAL MEDICINE

## 2024-09-27 NOTE — PROGRESS NOTES
ALLERGY & IMMUNOLOGY CLINIC - FOLLOW UP     HISTORY OF PRESENT ILLNESS     Patient ID: Bhavna Landry is a 69 y.o. female    CC: CVID    HPI: Bhavna Landry is a 69 y.o. female with a history of breast cancer (diagnosed 2022), HTN, psoriatic arthritis, OA, osteoporosis, rhinitis, recurrent sinusitis, wheezing and shortness of breath, following up for CVID.     No infections requiring antibiotics.    She says he has a lump on the left side of her neck that doesn't go away. Has been there for months. She says it is smaller when she is on antibiotics, bigger when not. Not getting progressively bigger over time. She says it isn't usually tender.     She reports rhinitis is doing the same. She uses flonase nightly, which she finds helpful. Not using sinus rinses.     She is having wheezing every night, not usually associated with shortness of breath. She is using albuterol every night. She still has the symbicort. She says she uses this too prn (alternates between the albuterol and symbicort). She says she sometimes wheezes when she can't cough her mucus up, then when she uses the inhaler, it loosens it so she can cough it up.     She has been dealing with fatigue.      MEDICAL HISTORY     Vaccines:    Immunization History   Administered Date(s) Administered    COVID-19 MRNA, LN-S PF (MODERNA HALF 0.25 ML DOSE) 04/01/2022    COVID-19, MRNA, LN-S, PF (MODERNA FULL 0.5 ML DOSE) 02/19/2021, 03/19/2021, 09/24/2021    Influenza 01/14/2014, 09/16/2016    Influenza (FLUAD) - Quadrivalent - Adjuvanted - PF *Preferred* (65+) 11/17/2021    Influenza - Quadrivalent 10/20/2015    Influenza - Quadrivalent - High Dose - PF (65 years and older) 10/03/2020, 09/23/2022, 09/14/2023    Influenza - Quadrivalent - PF *Preferred* (6 months and older) 12/11/2014, 10/30/2017    Influenza - Trivalent - Afluria, Fluzone MDV 01/14/2014    Influenza - Trivalent - Fluarix, Flulaval, Fluzone, Afluria - PF 09/16/2016    Influenza - Trivalent - Fluzone  High Dose - PF (65 years and older) 10/06/2018, 10/24/2019    Influenza Split 01/14/2014    Pneumococcal Conjugate - 13 Valent 08/15/2018    Pneumococcal Conjugate - 20 Valent 03/06/2024    Pneumococcal Polysaccharide - 23 Valent 01/14/2014, 11/26/2019, 10/03/2020, 05/24/2024    RSVpreF (Arexvy) 09/14/2023    Tdap 04/18/2007, 07/17/2017    Zoster 12/28/2015, 07/25/2016    Zoster Recombinant 10/12/2020, 01/02/2021     Medical Hx:   Patient Active Problem List   Diagnosis    PSA (psoriatic arthritis)    Low back pain    Essential hypertension    Hyperlipidemia    Paranoid schizophrenia    Insomnia    Extrapyramidal syndrome    Migraine without aura and without status migrainosus, not intractable    Duane's syndrome of right eye    Posterior vitreous detachment, both eyes    Retinal hemorrhage, left    Retinal tear    Fibromyalgia    History of nickel allergy    Internal derangement of right knee    S/P R knee surgery, TKA revision    Pain of right tibia    Brow ptosis    Sacroiliac joint dysfunction    Chronic pain    Myofascial pain syndrome    Restless legs    Osteoporosis, post-menopausal    Senile nuclear sclerosis    Venous incompetence    Bilateral sacroiliitis    PAF (paroxysmal atrial fibrillation)    Bilateral carotid artery disease    Chronic anticoagulation    Left groin pain    Left hip pain    Mucopurulent chronic bronchitis    Vestibular disequilibrium, left    Greater trochanteric bursitis of left hip    Depression    Rheumatoid arthritis    Mild cognitive impairment with memory loss    Atherosclerosis of aorta    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, estrogen receptor positive    Osteopenia of multiple sites    Use of anastrozole    Early dry stage nonexudative age-related macular degeneration of both eyes    Aromatase inhibitor-associated arthralgia    Drug-induced immunodeficiency    Iron deficiency anemia due to chronic blood loss    Decreased functional mobility    Decreased  range of motion of lumbar spine    Secondary and unspecified malignant neoplasm of axilla and upper limb lymph nodes    Leg edema, left    Gastroesophageal reflux disease    Injury of left foot    RASHIDA (generalized anxiety disorder)    Cervical spondylosis    Lumbar spondylosis    Cough    Impaired functional mobility, balance, gait, and endurance    Generalized weakness     Surgical Hx:   Past Surgical History:   Procedure Laterality Date    brow ptosis repair Right 2019    Surgeon: Dr. Karla Henson    CATARACT EXTRACTION      CAUDAL EPIDURAL STEROID INJECTION N/A 2024    Procedure: Caudal SOPHY;  Surgeon: Maile Storm DO;  Location: [facility];  Service: Pain Management;  Laterality: N/A;  ASA ok     SECTION      COLONOSCOPY N/A 2016    Procedure: COLONOSCOPY;  Surgeon: ANDRIA Connelly MD;  Location: [address]);  Service: Endoscopy;  Laterality: N/A;    COLONOSCOPY N/A 2022    Procedure: COLONOSCOPY;  Surgeon: Mikey Walters MD;  Location: [facility];  Service: Endoscopy;  Laterality: N/A;    cyst removed from right sinus  1982    EPIDURAL STEROID INJECTION INTO CERVICAL SPINE N/A 2023    Procedure: C6-7 SOPHY;  Surgeon: Spring Jensen MD;  Location: [facility];  Service: Pain Management;  Laterality: N/A;  20 mins    EPIDURAL STEROID INJECTION INTO LUMBAR SPINE N/A 2023    Procedure: Injection-steroid-epidural-lumbar L5-S1;  Surgeon: Chirag Kim MD;  Location: [facility];  Service: Pain Management;  Laterality: N/A;  asa    EPIDURAL STEROID INJECTION INTO LUMBAR SPINE N/A 2024    Procedure: L5/S1 Interlaminar SOPHY;  Surgeon: Maile Storm DO;  Location: [facility];  Service: Pain Management;  Laterality: N/A;  20mins-ASA 5d    ESOPHAGOGASTRODUODENOSCOPY N/A 2023    Procedure: EGD (ESOPHAGOGASTRODUODENOSCOPY);  Surgeon: Dougie Shea MD;  Location: [facility];  Service: Endoscopy;  Laterality:  N/A;    FOOT FRACTURE SURGERY      HYSTERECTOMY      VAGINAL HYSTERECTOMY WITHOUT BSO - ENDOMETRIOSIS    INJECTION FOR SENTINEL NODE IDENTIFICATION Right 05/09/2022    Procedure: INJECTION, FOR SENTINEL NODE IDENTIFICATION-Right;  Surgeon: NEAL Dhillon MD;  Location: [facility];  Service: General;  Laterality: Right;    INJECTION OF ANESTHETIC AGENT AROUND MEDIAL BRANCH NERVES INNERVATING LUMBAR FACET JOINT N/A 04/11/2019    Procedure: Lumbo-sacral Block, DR5 and Lateral Branches of S1,S2, S3;  Surgeon: Michelle Pineda Jr., MD;  Location: [facility] PAIN MG;  Service: Pain Management;  Laterality: N/A;  Pt takes  and states she holds ASA on her own whenever she has procedures.  Instructed to hold x 3 days prior to procedure.      INJECTION OF ANESTHETIC AGENT AROUND MEDIAL BRANCH NERVES INNERVATING LUMBAR FACET JOINT Bilateral 05/03/2023    Procedure: Block-nerve-medial branch-lumbar bilateral L3, L4, L5;  Surgeon: Maile Storm DO;  Location: [facility] PAIN MGT;  Service: Pain Management;  Laterality: Bilateral;  asa    INJECTION OF ANESTHETIC AGENT INTO SACROILIAC JOINT Right 08/30/2018    Procedure: BLOCK, SACROILIAC JOINT-Right- ORAL SEDATION;  Surgeon: Michelle Pineda Jr., MD;  Location: [facility];  Service: Pain Management;  Laterality: Right;    INJECTION OF ANESTHETIC AGENT INTO SACROILIAC JOINT Bilateral 09/27/2018    Procedure: BLOCK, SACROILIAC JOINT-BILATERAL;  Surgeon: Michelle Pineda Jr., MD;  Location: [facility] PAIN [facility];  Service: Pain Management;  Laterality: Bilateral;  Please keep at 10:00 due to trasnsportation    INJECTION OF ANESTHETIC AGENT INTO SACROILIAC JOINT Bilateral 02/07/2019    Procedure: Bilateral Sacroiliac Joint Injection - Per Dr Pineda, not necessary to hold ASA.;  Surgeon: Michelle Pineda Jr., MD;  Location: [facility] PAIN MG;  Service: Pain Management;  Laterality: Bilateral;    INJECTION, SPINE, LUMBOSACRAL, TRANSFORAMINAL APPROACH Right 7/17/2024    Procedure: Right   L3/4  and L4/5 TFESI;  Surgeon: Maile Storm DO;  Location: [facility] PAIN MANAGEMENT;  Service: Pain Management;  Laterality: Right;  20 mins  ASA 5 days    INTRAOCULAR PROSTHESES INSERTION Right 08/01/2019    Procedure: INSERTION, IOL PROSTHESIS;  Surgeon: Karen Song MD;  [facility];  Service: Ophthalmology;  Laterality: Right;    INTRAOCULAR PROSTHESES INSERTION Left 09/26/2019    Procedure: INSERTION, IOL PROSTHESIS;  Surgeon: Karen Song MD;  Location: [facility] OR [address] [place] [address];  Service: Ophthalmology;  Laterality: Left;    JOINT REPLACEMENT Right     knee    KNEE SURGERY      MASTECTOMY Right 05/09/2022    Procedure: MASTECTOMY-Right;  Surgeon: NEAL Dhillon MD;  Location: [facility];  Service: General;  Laterality: Right;  2.5 HOURS    PHACOEMULSIFICATION OF CATARACT Right 08/01/2019    Procedure: PHACOEMULSIFICATION, CATARACT;  Surgeon: Karen Song MD;  Location: [address];  Service: Ophthalmology;  Laterality: Right;    PHACOEMULSIFICATION OF CATARACT Left 09/26/2019    Procedure: PHACOEMULSIFICATION, CATARACT;  Surgeon: Karen Song MD;  Location: [facility] OR [address] [place] [address];  Service: Ophthalmology;  Laterality: Left;    RADIOFREQUENCY ABLATION, NERVE, PERIPHERAL Right 04/24/2024    Procedure: RIGHT L5 Dorsal Ramus and RIGHT Lateral Branches of S1, S2, and S3;  Surgeon: Maile Storm DO;  Location: [facility]IN [facility]EMENT;  Service: Pain Management;  Laterality: Right;  30 mins    RADIOFREQUENCY THERMOCOAGULATION Right 05/07/2019    Procedure: RADIOFREQUENCY THERMAL COAGULATION RIGHT DORSAL RAMUS 5 AND LATERAL BRANCH OF S1, S2 AND S3;  Surgeon: Michelle Pineda Jr., MD;  Location: [facility] PAIN MGT;  Service: Pain Management;  Laterality: Right;    RADIOFREQUENCY THERMOCOAGULATION Left 05/14/2019    Procedure: RADIOFREQUENCY THERMAL COAGULATION LEFT DORSAL RAMUS 5 AND LATERAL BRANCH OF S1,S2 AND S3;  Surgeon: Michelle Pineda Jr., MD;  Location: [facility] PAIN MGT;  Service: Pain  Management;  Laterality: Left;    RADIOFREQUENCY THERMOCOAGULATION Right 10/22/2019    Procedure: RADIOFREQUENCY THERMAL COAGULATION---DARSAL RAMUS 5 and LATERAL S1,S2,and S3 Right;  Surgeon: Michelle Pineda Jr., MD;  Location: [facility];  Service: Pain Management;  Laterality: Right;  patient to sign consent DOS    RADIOFREQUENCY THERMOCOAGULATION Left 10/29/2019    Procedure: RADIOFREQUENCY THERMAL COAGULATION - LEFT - DR5, S1,S2, AND S3;  Surgeon: Michelle Pineda Jr., MD;  Location: [facility];  Service: Pain Management;  Laterality: Left;    RADIOFREQUENCY THERMOCOAGULATION Left 05/26/2020    Procedure: RADIOFREQUENCY THERMAL COAGULATION--Left DR5+ lateral branches of S1, S2, S3;  Surgeon: Michelle Pineda Jr., MD;  Location: [facility];  Service: Pain Management;  Laterality: Left;    RADIOFREQUENCY THERMOCOAGULATION Right 06/02/2020    Procedure: RADIOFREQUENCY THERMAL COAGULATION--Right DR5+ lateral branches of S1, S2, S3;  Surgeon: Michelle Pineda Jr., MD;  Location: [facility];  Service: Pain Management;  Laterality: Right;    SENTINEL LYMPH NODE BIOPSY Right 05/09/2022    Procedure: BIOPSY, LYMPH NODE, SENTINEL-Right;  Surgeon: NEAL Dhillon MD;  Location: [facility];  Service: General;  Laterality: Right;    SINUS SURGERY      Surgery on right knee  07/09/1982    TONSILLECTOMY      tumor removed from back left side upper shoulder  03/17/2006     Medications:   Current Outpatient Medications on File Prior to Visit   Medication Sig Dispense Refill    albuterol (PROVENTIL) 2.5 mg /3 mL (0.083 %) nebulizer solution Take 3 mLs (2.5 mg total) by nebulization every 6 (six) hours as needed for Wheezing. Rescue 90 each 2    albuterol (PROVENTIL/VENTOLIN HFA) 90 mcg/actuation inhaler USE 2 INHALATIONS BY MOUTH 4  TIMES DAILY AS NEEDED 34 g 3    amLODIPine (NORVASC) 5 MG tablet Take 1 tablet (5 mg total) by mouth 2 (two) times a day. 180 tablet 1    ascorbic acid, vitamin C, (VITAMIN C) 500 MG  tablet Take 500 mg by mouth once daily.      aspirin (ECOTRIN) 81 MG EC tablet Take 81 mg by mouth once daily.      atorvastatin (LIPITOR) 80 MG tablet TAKE 1 TABLET(80 MG) BY MOUTH EVERY DAY 90 tablet 0    baclofen (LIORESAL) 10 MG tablet 1 tab po tid for muscle cramps. 60 tablet 1    benztropine (COGENTIN) 0.5 MG tablet TAKE 1 TABLET(0.5 MG) BY MOUTH TWICE DAILY 180 tablet 3    budesonide-formoterol 80-4.5 mcg (SYMBICORT) 80-4.5 mcg/actuation HFAA INHALE 2 PUFFS BY MOUTH TWICE DAILY 30.6 g 2    ciclopirox (PENLAC) 8 % Soln Apply topically nightly. Paint on affected nail(s) once per night, remove residue once per week with alcohol 6.6 mL 3    exemestane (AROMASIN) 25 mg tablet Take 1 tablet (25 mg total) by mouth once daily. 30 tablet 11    fluticasone (VERAMYST) 27.5 mcg/actuation nasal spray 2 sprays by Nasal route as needed for Rhinitis.      fluticasone propionate (FLONASE) 50 mcg/actuation nasal spray 1 spray by Each Nostril route once daily.      folic acid (FOLVITE) 1 MG tablet Take 1 tablet (1,000 mcg total) by mouth once daily. 90 tablet 3    HYDROcodone-acetaminophen (NORCO) 5-325 mg per tablet Take 1 tablet by mouth every 12 (twelve) hours as needed for Pain. 60 tablet 0    isosorbide mononitrate (IMDUR) 30 MG 24 hr tablet Take 1 tablet (30 mg total) by mouth once daily. 90 tablet 3    LIDOcaine (LIDODERM) 5 % Place 1 patch onto the skin once daily. Remove & Discard patch within 12 hours or as directed by MD 30 patch 0    meloxicam (MOBIC) 7.5 MG tablet Take 1 tablet (7.5 mg total) by mouth once daily. 14 tablet 0    methotrexate 2.5 MG Tab Take 8 tablets (20 mg total) by mouth every 7 days. TAKE 4 TABLETS MONDAY MORNING AND TAKE 4 TABLETS MONDAY NIGHT ONLY 96 tablet 0    omega-3 fatty acids/fish oil (FISH OIL-OMEGA-3 FATTY ACIDS) 300-1,000 mg capsule Take by mouth once daily.      omeprazole (PRILOSEC) 20 MG capsule TAKE 1 CAPSULE(20 MG) BY MOUTH EVERY DAY 30 capsule 11    omeprazole (PRILOSEC) 40 MG  capsule Take 1 capsule (40 mg total) by mouth every morning. 90 capsule 3    pregabalin (LYRICA) 25 MG capsule Take 1 capsule (25 mg total) by mouth every evening. 30 capsule 0    risperiDONE (RISPERDAL) 2 MG tablet TAKE 1 TABLET(2 MG) BY MOUTH EVERY MORNING 90 tablet 3    risperiDONE (RISPERDAL) 4 MG tablet TAKE 1 TABLET(4 MG) BY MOUTH EVERY EVENING 90 tablet 3    valsartan (DIOVAN) 80 MG tablet 1 tab po q hs 90 tablet 0    venlafaxine (EFFEXOR-XR) 75 MG 24 hr capsule Take 1 capsule (75 mg total) by mouth once daily. 90 capsule 3    vitamin D (VITAMIN D3) 1000 units Tab Take 1,000 Units by mouth once daily.      vitamin E 200 UNIT capsule Take 200 Units by mouth once daily.      zinc gluconate 50 mg tablet Take 50 mg by mouth once daily.       Current Facility-Administered Medications on File Prior to Visit   Medication Dose Route Frequency Provider Last Rate Last Admin    tropicamide 1% ophthalmic solution 1 drop  1 drop Right Eye On Call Procedure Karen Song MD   1 drop at [phone]     Drug Allergies:   Review of patient's allergies indicates:   Allergen Reactions    Etanercept Other (See Comments)     Other reaction(s): recurrent infections    Chloramphenicol sod succinate Hives    Codeine Other (See Comments)     Other reaction(s): Stomach upset. Pt states OK with Percocet    Nickel sutures [surgical stainless steel] Dermatitis     Allergic contact dermatitis    Adhesive Rash     Social Hx:   Social History     Tobacco Use    Smoking status: Every Day     Current packs/day: 0.00     Average packs/day: 0.3 packs/day for 10.0 years (2.5 ttl pk-yrs)     Types: Cigarettes     Start date: 1/10/2013     Last attempt to quit: 1/10/2023     Years since quittin.7     Passive exposure: Never    Smokeless tobacco: Former    Tobacco comments:     about 5 cig a day   Substance Use Topics    Alcohol use: No    Drug use: No     Additional History Obtained at Initial Visit:  H/o Asthma: as a child.   H/o  "Rhinitis: endorses  Env/Occ:   Pets: dog and cat. She thinks the cat might trigger symptoms.  Social Hx:   She started smoking in her 30's. She smokes about 5 cigarettes per day.  Family Hx:   Asthma: mother  Immune deficiency: unknown      PHYSICAL EXAM     VS: /72 (BP Location: Left arm, Patient Position: Sitting, BP Method: Medium (Automatic))   Pulse 75   Ht 5' 2" (1.575 m)   Wt 45.4 kg (100 lb 1.4 oz)   SpO2 97%   BMI 18.31 kg/m²   GENERAL: Alert, NAD, well-appearing  EYES: EOMI, no conjunctival injection, no discharge, no infraorbital shiners  NOSE: NT 2+ B/L, no stringing mucus, no polyps visualized  ORAL: MMM, no ulcers, no thrush  LUNGS: CTAB, no w/r/c, no increased WOB  HEART: RRR, normal S1/S2, no m/g/r  NECK: + left sided cervical lymphadenopathy, about 1 inch, tender to palpation  DERM: no rashes  NEURO: normal speech, no facial asymmetry     LABORATORY STUDIES     Component      Latest Ref Rng 5/31/2024 6/3/2024 8/14/2024   WBC      3.90 - 12.70 K/uL 7.93  8.68  8.58    RBC      4.00 - 5.40 M/uL 4.20  4.35  3.81 (L)    Hemoglobin      12.0 - 16.0 g/dL 13.6  14.8  12.6    Hematocrit      37.0 - 48.5 % 40.3  42.5  37.2    MCV      82 - 98 fL 96  98  98    MCH      27.0 - 31.0 pg 32.4 (H)  34.0 (H)  33.1 (H)    MCHC      32.0 - 36.0 g/dL 33.7  34.8  33.9    RDW      11.5 - 14.5 % 15.4 (H)  15.7 (H)  14.7 (H)    Platelet Count      150 - 450 K/uL 345  400  259    MPV      9.2 - 12.9 fL 8.1 (L)  8.5 (L)  8.5 (L)    Immature Granulocytes      0.0 - 0.5 % 0.3   0.2    Gran # (ANC)      1.8 - 7.7 K/uL 4.1  5.7  6.3    Immature Grans (Abs)      0.00 - 0.04 K/uL 0.02  0.02  0.02    Lymph #      1.0 - 4.8 K/uL 2.6   1.4    Mono #      0.3 - 1.0 K/uL 0.9   0.6    Eos #      0.0 - 0.5 K/uL 0.3   0.2    Baso #      0.00 - 0.20 K/uL 0.06   0.04    Differential Method Automated   Automated    Sodium      136 - 145 mmol/L 140  135 (L)  137    Potassium      3.5 - 5.1 mmol/L 4.4  4.4  4.0    Chloride      95 " - 110 mmol/L 101  100  105    CO2      23 - 29 mmol/L 25  27  25    Glucose      70 - 110 mg/dL 84  92  110    BUN      8 - 23 mg/dL 10  3 (L)  8    Creatinine      0.5 - 1.4 mg/dL 0.8  0.9  0.8    Calcium      8.7 - 10.5 mg/dL 10.1  9.3  9.3    PROTEIN TOTAL      6.0 - 8.4 g/dL 6.8  6.3  5.8 (L)    Albumin      3.5 - 5.2 g/dL 4.2  3.8  3.6    BILIRUBIN TOTAL      0.1 - 1.0 mg/dL 0.3  0.2  0.4    ALP      55 - 135 U/L 69  67  56    AST      10 - 40 U/L 19  19  19    ALT      10 - 44 U/L 13  13  11      Component      Latest Ref [facility] 3/22/2023   TSH      0.400 - 4.000 uIU/mL 2.593      Component      Latest Ref [facility] 2/26/2024   CD3 % Total T Cell      55 - 83 % 75.5    Absolute CD3      700 - 2100 cells/ul 1081    CD8 % Suppressor T Cell      10 - 39 % 22.2    Absolute CD8      200 - 900 cells/ul 318    CD4 % [place] T Cell      28 - 57 % 57.7 (H)    Absolute CD4      300 - 1400 cells/ul 827    CD4/CD8 Ratio      0.9 - 3.6  2.60    CD56 + CD16 Natural Killers      7 - 31 % 18.4    Absolute CD56 + CD16      90 - 600 cells/ul 264    CD19 B Cells      6 - 19 % 5.8 (L)    Absolute CD19      100 - 500 cells/ul 83 (L)      Component      Latest Ref [facility] 2/26/2024 4/3/2024 6/21/2024   S.pneumoniae Type 1      >=1.0 mcg/mL 2.1  2.2  2.5    Pneumococcal Serotype 2 IgG (PNX)      >=1.0 mcg/mL 0.3  0.3  0.3    S.pneumoniae Type 3      >=1.0 mcg/mL <0.1  0.1  0.1    Pneumococcal Serotype 4 IgG  (P7,P13,PNX)      >=1.0 mcg/mL 0.1  0.1  0.1    S.pneumoniae Type 5      >=1.0 mcg/mL 2.0  2.1  3.1    S.pneumoniae Type 8      >=1.0 mcg/mL 0.2  0.2  0.2    S.pneumoniae Type 9N      >=1.0 mcg/mL 0.1  0.1  0.1    S.pneumoniae Type 12F      >=1.0 mcg/mL <0.1  <0.1  <0.1    Pneumococcal Serotype 14 IgG (P7,P13,PNX)      >=1.0 mcg/mL 0.2  0.3  0.4    Pneumococcal Serotype 17F IgG (PNX)      >=1.0 mcg/mL 0.3  0.3  0.3    S.pneumoniae Type 19F      >=1.0 mcg/mL 1.0  1.0  0.9    Pneumococcal Serotype 20 IgG (PNX)      >=1.0 mcg/mL 0.9  0.9  0.7     Pneumococcal Serotype 22F IgG (PNX)      >=1.0 mcg/mL 0.1  0.1  0.1    S.pneumoniae Type 23F      >=1.0 mcg/mL 0.2  0.2  0.2    S.pneumoniae Type 6B      >=1.0 mcg/mL 0.2  0.3  0.2    Pneumococcal Serotype 10A IgG (PNX)      >=1.0 mcg/mL 5.7  6.0  6.2    Pneumococcal Serotype 11A IgG (PNX)      >=1.0 mcg/mL 0.1  0.1  0.2    S.pneumoniae Type 7F      >=1.0 mcg/mL 1.6  1.6  1.6    Pneumococcal Serotype 15B IgG (PNX)      >=1.0 mcg/mL 0.1  0.2  0.2    S.pneumoniae Type 18C      >=1.0 mcg/mL 0.9  1.0  0.9    Pneumococcal Serotype 19A IgG (P13,PNX)      >=1.0 mcg/mL 3.5  3.6  3.4    S.pneumoniae Type 9V Abs      >=1.0 mcg/mL 0.1  0.1  0.1    Pneumococcal Serotype 33 IgG (PNX)      >=1.0 mcg/mL 0.2  0.3  0.4      Component      Latest Ref [facility] 2/21/2024 2/26/2024 6/21/2024   IgG      650 - 1600 mg/dL 409 (L)  364 (L)  383 (L)    IgA Level      40 - 350 mg/dL 139  128  125    IgM      50 - 300 mg/dL 21 (L)  20 (L)  18 (L)    Total IgE      0 - 100 IU/mL  <35        Component      Latest Ref [facility] 2/21/2024   Protein, Serum      6.0 - 8.4 g/dL 6.0    Albumin grams/dl      3.35 - 5.55 g/dL 3.86    Alpha-1 grams/dl      0.17 - 0.41 g/dL 0.31    Alpha-2      0.43 - 0.99 g/dL 0.79    Beta      0.50 - 1.10 g/dL 0.65    Gamma      0.67 - 1.58 g/dL 0.39 (L)    Pathologist Interpretation SPE Borderline decreased total protein. Normal gamma globulins are decreased. No definitive monoclonal peaks identified.    Pathologist Interpretation SAUMYA No monoclonal peaks identified.       ALLERGEN TESTING     Immunocaps:   Component      Latest Ref [facility] 2/26/2024   D. farinae      <0.10 kU/L <0.10    D. farinae Class CLASS 0    D. pteronyssinus Dust Mite IgE      <0.10 kU/L <0.10    D. pteronyssinus Class CLASS 0    [place] Grass IgE      <0.10 kU/L <0.10    [place] Grass Class CLASS 0    [place] Grass IgE      <0.10 kU/L <0.10    [place] Grass Class CLASS 0    [place] IgE      <0.10 kU/L <0.10    [place] Class CLASS 0    English Plantain IgE       "<0.10 kU/L <0.10    English Plantain Class CLASS 0    [place] Tree IgE      <0.10 kU/L <0.10    [place], Class CLASS 0    Pecan [place] Tree IgE      <0.10 kU/L <0.10    Pecan, Class CLASS 0    Marshelder IgE      <0.10 kU/L <0.10    Marshelder Class CLASS 0    Ragweed, Western IgE      <0.10 kU/L <0.10    Ragweed, Western, Class CLASS 0    A. alternata IgE      <0.10 kU/L <0.10    Altern. alternata Class CLASS 0    Aspergillus Fumigatus IgE      <0.10 kU/L <0.10    A. fumigatus Class CLASS 0    Cat Dander IgE      <0.10 kU/L <0.10    Cat Epithelium Class CLASS 0    Cockroach, IgE      <0.10 kU/L <0.10    Cockroach, IgE       CLASS 0    Dog Dander IgE      <0.10 kU/L <0.10    Dog Dander Class CLASS 0    Curvularia lunata      <0.10 kU/L <0.10    Curvularia Lunata Class CLASS 0    Penicillium IgE      <0.10 kU/L <0.10    Penicillium Class CLASS 0    Cladosporium IgE      <0.10 kU/L <0.10    Cladosporium Class CLASS 0       PULMONARY FUNCTION TESTING     Date 5/3/24:  FVC:         96%ref   FEV1:         87%ref  FEV1/FVC: 70%  FEF 25-75: 60%ref  DLCO:        55%ref  Interpretation: Spirometry is normal. Lung volume determination is normal. Airway mechanics are abnormal showing increased airway resistance and decreased conductance. DLCO is moderately decreased.     IMAGING & OTHER DIAGNOSTICS     CT head 8/14/24 notes that "Mastoid air cells and paranasal sinuses are essentially clear."    CT chest low dose 5/14/24:  (See report for full read).  Lungs: There are no abnormal opacities that require further evaluation.  The largest opacity in the right lung appears solid and measures 0.4 cm on series 4, image 97, unchanged.  The largest opacity in the left lung appears calcified and measures 0.2 cm on series 4, image 366. The lungs show no findings consistent with emphysema.  Impression:  Lung-RADS Category:  2 - Benign Appearance or Behavior - continue annual screening with LDCT in 12 months.  Clinically or potentially " clinically significant non lung cancer finding:  None.  Prior Lung Cancer Modifier:  No history of prior lung cancer.    CXR 1 view 1/25/24:  FINDINGS:  Heart size and pulmonary vascularity are within normal limits.  Mild tortuosity of the thoracic aorta and brachiocephalic vessels with atherosclerotic calcification in the wall of the aortic arch.  Lungs are satisfactorily expanded.  Mild accentuation of the interstitial pattern bilaterally.  No airspace consolidation or pleural effusion.  No pneumothorax.  Skeletal structures appear intact.  Thoracolumbar scoliosis.  Impression:  No acute cardiopulmonary disease and no significant interval change    CT sinuses 11/3/22:  Impression:  Acute appearing right maxillary sinusitis.  Prior right maxillary antral window.  Small area of sclerosis, and osseous lucency anterior mid maxilla along the midline incisive canal with punctate  dense material area suggestive of possible prior dental procedure.  There is also a small dense linear area of what appears to be cement, left to the midline within a small incisive canal, medial to the infra orbital canal, likely relate to a prior root canal treatment it is also unchanged from prior study no definite additional osseous erosive changes.    CT chest low dose 8/21/18:  (See report for full read).  Lungs: There are no abnormal opacities that require further evaluation.   The lungs show no findings consistent with emphysema.  IMPRESSION:   Lung-RADS Category:  1 - Negative - Continue annual screening with LDCT in 12 months.  Clinically or potentially clinically significant non lung cancer finding:  None.  Prior Lung Cancer Modifier:  No history of prior lung cancer.      CHART REVIEW     Reviewed labs, imaging.      ASSESSMENT & PLAN     Bhavna Landry is a 69 y.o. female with     # CVID; History of recurrent sinusitis: She required antibiotics at least 4 times between 2/2023 and 2/2024. She was previously on sulfasalazine for her  psoriatic arthritis, which can cause hypogam, but hasn't been on it since 2021 (and it isn't clear how long the hypogam effects would last). Low total IgG and IgM. She had previously received pneumovax 3 times and prevnar-13 once. Pneumococcal titers were low, didn't respond to prevnar-20 or repeat pneumovax. Lymphocyte flow cytometry with low CD19 B cells.  -again discussed option of Ig replacement therapy (both IV and SubQ). Patient thinks that if she did decide to move forward with it, she would prefer IVIg. But as she has not had any infections requiring antibiotics since 2/2024, she isn't sure if she is ready to proceed. Given lack of recent infections, reasonable to wait; however, if she were to start getting frequent sinusitis again or if she were to develop a more serious infection such as pneumonia, would recommend moving forward with Ig replacement therapy at that time.     # Rhinitis: Immunocaps negative for evidence of allergy. She reports flonase helps.  -continue flonase 1-2 SEN nightly.     # Wheezing and shortness of breath: Suspected COPD as patient is a long time smoker and previous chest imaging with emphysematous changes; however, she saw pulm recently, spirometry was without obstruction and more recent CT chest without emphysematous changes. Total eos have been normal. She reports she has been using albuterol or symbicort every night for wheezing (usually without shortness of breath).  -given the frequency of symptoms, recommend she start using the symbicort 80 mcg as a controller (2 puffs BID, or at least 2 puffs every night).  -continue albuterol or symbicort as rescue.    # Cervical lymphadenopathy: Left sided. She reports it has been present for months.  -alerting her oncologist giving her history of breast cancer.      Follow up: 3-6 months    I spent a total of 65 minutes on the day of the visit.  This includes face to face time and non-face to face time preparing to see the patient (eg,  review of tests), obtaining and/or reviewing separately obtained history, documenting clinical information in the electronic or other health record, independently interpreting results and communicating results to the patient/family/caregiver, or care coordinator.    Jeff Benítez MD  Allergy/Immunology

## 2024-09-30 ENCOUNTER — CLINICAL SUPPORT (OUTPATIENT)
Dept: REHABILITATION | Facility: HOSPITAL | Age: 69
End: 2024-09-30
Payer: MEDICARE

## 2024-09-30 ENCOUNTER — PATIENT MESSAGE (OUTPATIENT)
Dept: ALLERGY | Facility: CLINIC | Age: 69
End: 2024-09-30
Payer: MEDICARE

## 2024-09-30 DIAGNOSIS — R53.1 GENERALIZED WEAKNESS: ICD-10-CM

## 2024-09-30 DIAGNOSIS — Z74.09 IMPAIRED FUNCTIONAL MOBILITY, BALANCE, GAIT, AND ENDURANCE: Primary | ICD-10-CM

## 2024-09-30 PROCEDURE — 97530 THERAPEUTIC ACTIVITIES: CPT | Mod: KX,PN

## 2024-09-30 RX ORDER — PREGABALIN 50 MG/1
50 CAPSULE ORAL NIGHTLY
Qty: 30 CAPSULE | Refills: 2 | Status: SHIPPED | OUTPATIENT
Start: 2024-09-30

## 2024-09-30 RX ORDER — PREGABALIN 25 MG/1
25 CAPSULE ORAL NIGHTLY
Qty: 30 CAPSULE | Refills: 2 | Status: SHIPPED | OUTPATIENT
Start: 2024-09-30

## 2024-09-30 NOTE — PROGRESS NOTES
[facility]   Physical Therapy Treatment Note / Discharge     Name: Bhavna Landry  Clinic Number: [id number]    Therapy Diagnosis:   Encounter Diagnoses   Name Primary?    Impaired functional mobility, balance, gait, and endurance Yes    Generalized weakness      Physician: Aleena Perdue PA-C    Visit Date: 9/30/2024    Physician Orders: PT Eval and Treat   Medical Diagnosis from Referral:   T84.84XA,Z96.659 (ICD-10-CM) - Pain in knee region after total knee replacement, initial encounter   M54.10 (ICD-10-CM) - Radicular leg pain   M54.50,G89.29 (ICD-10-CM) - Chronic left-sided low back pain without sciatica      Evaluation Date: 6/26/2024  Authorization Period Expiration: 12/31/2024  Plan of Care Expiration: 8/7/2024; 9/18/2024  Progress Note Due: 7/18/2024  Date of Surgery: none   Visit # / Visits authorized: 11/20   FOTO: MET GOAL     Precautions: Standard and hx of cancer     Time In: 1:10 pm  Time Out: 1:33 pm   Total Billable Time: 23 minutes - 1:1 physical therapist    PTA Visit #: 0/5       Subjective     Patient reports: had autoimmune issues requiring daily shots and she also had been having low iron saturation. She has been trying to figure out all those issues. Thinks she may be having internal bleeding. Got another epidural through the sacrum which has helped significantly. She is having very little pain now. She would like to be discharged cause of other health issues right now that she needs to focus on.     She was not compliant with home exercise program.  Response to previous treatment: improved pain levels  Functional change: improved ability to ambulate    Pain: 2/10 beginning  Location: right buttocks, SIJ, and low back     Objective      9/30/2024  AROM: Seated    Degrees Pain/Dysfunction   Flexion Finger tips to toes  NP  Emerita's Sign: -   Extension 10 NP   Right Side Bending  100% NP   Left Side Bending  100% NP   Right Rotation 100% NP   Left Rotation 100%% NP       Strength:  RLE   LLE     Hip flexion: 5/5 Hip flexion: 5/5   Hip Abduction: 4+/5 Hip abduction: 4+/5   Hip adduction: 4+/5 Hip adduction: 4+/5   Hip ER 4+/5 Hip ER 4+/5   Hip IR 4+/5 Hip IR 4+/5   Knee flexion: 4+/5 Knee flexion: 4+/5   Knee extension: 5/5 Knee extension: 5/5   Ankle Dorsiflexion: 5/5 Ankle Dorsiflexion: 5/5   Ankle Plantarflexion: 5/5 Ankle Plantarflexion: 5/5      TUG: 10.43 seconds   30 second sit to stand: 13x without upper extremity support     Treatment     Bhavna received the treatments listed below:      Therapeutic activities to improve functional performance for 23 minutes, including:  FOTO  Objective tests and measures  Outcome measures  Home exercise program overview   Questions and answers   Discharge       Patient Education and Home Exercises       Education provided:   - compliance with HEP    Written Home Exercises Provided: yes. Exercises were reviewed and Bhavna was able to demonstrate them prior to the end of the session.  Bhavna demonstrated good  understanding of the education provided. See Electronic Medical Record under Patient Instructions for exercises provided during therapy sessions    Assessment     Bhavna is a 68 y.o. 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 Patient presents with signs and symptoms of chronic low back pain. Patient has not attended therapy in over one month and would like to be discharged to focus on other health concerns. Patient has progressed well in therapy and is no longer using AD for ambulation, decreased pain with transitional activities, increased gross lower extremity and hip strength, and increased lumbar AROM. Concerns over compliance with home exercise program once discharged from  physical therapy secondary to history of poor compliance.     Bhavna Is progressing well towards her goals.   Patient prognosis is Fair.     Patient will continue to benefit from skilled outpatient physical therapy to address the deficits listed in the problem list box on initial evaluation, provide pt/family education and to maximize pt's level of independence in the home and community environment.     Patient's spiritual, cultural and educational needs considered and pt agreeable to plan of care and goals.     Anticipated barriers to physical therapy: chronic re-occurrences; high intensity pain levels     Goals:   Short Term Goals (3 Weeks):  1. Patient will be compliant with home exercise program to supplement therapy in promoting functional mobility. MET 9/30/2024  2. Patient will perform transverse abdominus activation with good control to demonstrate improved core strength. MET 8/15/2024  3. Patient will report no pain during bed mobility transitions to promote functional mobility. MET 8/15/2024  4. Patient will improve impaired lower extremity myotomes/manual muscle tests  to >/=4/5 to improve strength for functional tasks. MET 9/30/2024     Long Term Goals (6 Weeks):   1. Patient will improve FOTO score to </= 47% limited to decrease perceived limitation with maintaining/changing body position. MET 8/15/2024  2. Patient will be able to sleep for >/= 3 nights without waking from pain to improve quality of life. MET 9/30/2024  3. Patient will improve impaired lower extremity myotomes/manual muscle tests to >/=4+/5 to improve strength for functional tasks. MET 9/30/2024  4. Patient will be able to complete 6 MWT to improve aerobic tolerance and functional lower extremity strength and endurance. no met 9/30/2024    Plan     dishcharged    Stephanie Casetllanos, PT

## 2024-10-01 RX ORDER — AMLODIPINE BESYLATE 10 MG/1
5 TABLET ORAL 2 TIMES DAILY
Qty: 30 TABLET | Refills: 11 | Status: SHIPPED | OUTPATIENT
Start: 2024-10-01

## 2024-10-02 ENCOUNTER — TELEPHONE (OUTPATIENT)
Dept: ALLERGY | Facility: CLINIC | Age: 69
End: 2024-10-02
Payer: MEDICARE

## 2024-10-02 DIAGNOSIS — C50.411 CARCINOMA OF UPPER-OUTER QUADRANT OF RIGHT BREAST IN FEMALE, ESTROGEN RECEPTOR POSITIVE: ICD-10-CM

## 2024-10-02 DIAGNOSIS — Z17.0 CARCINOMA OF UPPER-OUTER QUADRANT OF RIGHT BREAST IN FEMALE, ESTROGEN RECEPTOR POSITIVE: ICD-10-CM

## 2024-10-02 NOTE — TELEPHONE ENCOUNTER
----- Message from Jeff Benítez MD sent at 9/30/2024 12:16 PM CDT -----  Regarding: hizentra start  Hello,  I am starting this patient on hizentra for CVID (common variable immunodeficiency). I have sent the prescription to the home infusion pharmacy.    Jeff Benítez MD  Allergy/Immunology

## 2024-10-03 ENCOUNTER — TELEPHONE (OUTPATIENT)
Dept: HEMATOLOGY/ONCOLOGY | Facility: CLINIC | Age: 69
End: 2024-10-03
Payer: MEDICARE

## 2024-10-03 NOTE — TELEPHONE ENCOUNTER
----- Message from [employer] sent at 10/3/2024  9:14 AM CDT -----  Regarding: Returning a Missed Call  Contact: :Bhavna Landry  Returning a Missed Call     Caller:Bhavna Landry         Returning call to: Curtis     Caller can be reached @:[phone] (home)       Nature of the call:Patient is returning call. Patient states the lump is not underneath arm. Its underneath her jaw bone left side. Requesting a  call back

## 2024-10-03 NOTE — TELEPHONE ENCOUNTER
----- Message from Cheyanne Smith NP sent at 10/2/2024  8:03 AM CDT -----  Regarding: swollen lymph nodes  Please call patient and see if she wants to come in sooner than November (sometime in the next week or two) for further evaluation.  Her allergist messaged me saying that she had some swollen lymph nodes under her left arm.  Thanks.    Cheyanne  ----- Message -----  From: Jeff Benítez MD  Sent: 9/27/2024  11:16 AM CDT  To: Cheyanne Smith NP    Helroshni Smith,   I saw this patient today for her CVID, and you follow her for her history of breast cancer. She reported to me today that she has left sided lymphadenopathy for months. I did feel it on exam as well (she told me that it wasn't typically tender, but it was tender to palpation today). I wasn't sure if imaging would be warranted or not, but I wanted to let you know in case you wanted to get imaging before your next appointment in November.     Jeff Benítez MD  Allergy/Immunology

## 2024-10-04 ENCOUNTER — TELEPHONE (OUTPATIENT)
Dept: ALLERGY | Facility: CLINIC | Age: 69
End: 2024-10-04
Payer: MEDICARE

## 2024-10-04 ENCOUNTER — TELEPHONE (OUTPATIENT)
Dept: INTERNAL MEDICINE | Facility: CLINIC | Age: 69
End: 2024-10-04
Payer: MEDICARE

## 2024-10-04 ENCOUNTER — TELEPHONE (OUTPATIENT)
Dept: HEMATOLOGY/ONCOLOGY | Facility: CLINIC | Age: 69
End: 2024-10-04
Payer: MEDICARE

## 2024-10-04 NOTE — TELEPHONE ENCOUNTER
Returned call to let patient know pcp states she needs to ask pain management provider or come in to see pcp.

## 2024-10-04 NOTE — TELEPHONE ENCOUNTER
Called patient back regarding her question on a DEXA Scan. Patient wanted confirmation on upcoming appointments and asked if she needed another bone scan. I explained to her most insurance only covers bone scans once every two years. Per her last visit note that scan was also not requested per Cheyanne.Patient was okay with that. Patient confirmed next few appointments with us. Patient thankful for our time.

## 2024-10-04 NOTE — TELEPHONE ENCOUNTER
Spoke with Princess DE LUNA at [facility] regarding Hizentra PA. They are waiting on the PA form to be filled out and returned for the review.

## 2024-10-04 NOTE — TELEPHONE ENCOUNTER
----- Message from Elvieroselyn sent at 10/3/2024  4:07 PM CDT -----  Contact: [phone]  1MEDICALADVICE     Patient is calling for Medical Advice regarding: Pt said she has been sending messages about getting a refill on tramadol 50 mg cause she was on HYDROcodone-acetaminophen (NORCO) 5-325 mg per tablet and wants to get off that one     How long has patient had these symptoms:    Pharmacy name and phone#:   [id number] [place]  [address] AT [facility] [address]  [address] [address]  Phone: [phone] Fax: [phone]    Patient wants a call back or thru [facility]: call back     Comments: Please call pt back     Please advise patient replies from provider may take up to 48 hours.

## 2024-10-04 NOTE — TELEPHONE ENCOUNTER
Spoke with Sue at [facility] who stated that they did received a key code to start the PA process and once its confirmed and reviewed they will contact the patient.

## 2024-10-07 ENCOUNTER — TELEPHONE (OUTPATIENT)
Dept: ALLERGY | Facility: CLINIC | Age: 69
End: 2024-10-07
Payer: MEDICARE

## 2024-10-07 NOTE — TELEPHONE ENCOUNTER
"----- Message from Jeff Benítez MD sent at 10/7/2024 12:25 PM CDT -----  Regarding: Ig therapy  I received this message from this patient: "I received a call from the home infusion pharmacy.  My share of the home infusions would be $171, which I can't afford.  She recommended that I see if the hospital administered IV would have more coverage from the insurance."    What would be the best way to order the IVIg? (As a therapy plan or as an prescription)?    Jeff Benítez MD  Allergy/Immunology  "

## 2024-10-07 NOTE — TELEPHONE ENCOUNTER
----- Message from Med Assistant Prajapati sent at 10/4/2024  1:50 PM CDT -----  Regarding: Hizentra Referral  Good afternoon,    We have received authorization for hizentra from the insurance company. But Ms. Pierre has declined adding that they are a  on a fixed income and were told by the MD they will most likely be on this long term and they cannot afford that.     Please advise how you would like us to proceed.    Thanks,  Victorina   13-Sep-2021

## 2024-10-07 NOTE — TELEPHONE ENCOUNTER
You can do a therapy plan but make sure you change the location to [facility] so the PA can be worked on

## 2024-10-09 ENCOUNTER — IMMUNIZATION (OUTPATIENT)
Dept: INTERNAL MEDICINE | Facility: CLINIC | Age: 69
End: 2024-10-09
Payer: MEDICARE

## 2024-10-09 ENCOUNTER — OFFICE VISIT (OUTPATIENT)
Dept: INTERNAL MEDICINE | Facility: CLINIC | Age: 69
End: 2024-10-09
Payer: MEDICARE

## 2024-10-09 ENCOUNTER — OFFICE VISIT (OUTPATIENT)
Dept: HEMATOLOGY/ONCOLOGY | Facility: CLINIC | Age: 69
End: 2024-10-09
Payer: MEDICARE

## 2024-10-09 VITALS
BODY MASS INDEX: 18.7 KG/M2 | WEIGHT: 101.63 LBS | HEIGHT: 62 IN | OXYGEN SATURATION: 98 % | DIASTOLIC BLOOD PRESSURE: 62 MMHG | HEART RATE: 63 BPM | SYSTOLIC BLOOD PRESSURE: 114 MMHG

## 2024-10-09 VITALS
TEMPERATURE: 98 F | WEIGHT: 101.63 LBS | SYSTOLIC BLOOD PRESSURE: 131 MMHG | HEART RATE: 73 BPM | DIASTOLIC BLOOD PRESSURE: 60 MMHG | RESPIRATION RATE: 17 BRPM | BODY MASS INDEX: 18.7 KG/M2 | OXYGEN SATURATION: 96 % | HEIGHT: 62 IN

## 2024-10-09 DIAGNOSIS — M79.621 PAIN IN RIGHT AXILLA: ICD-10-CM

## 2024-10-09 DIAGNOSIS — Z23 NEED FOR VACCINATION: Primary | ICD-10-CM

## 2024-10-09 DIAGNOSIS — C50.411 CARCINOMA OF UPPER-OUTER QUADRANT OF RIGHT BREAST IN FEMALE, ESTROGEN RECEPTOR POSITIVE: Primary | ICD-10-CM

## 2024-10-09 DIAGNOSIS — G89.4 CHRONIC PAIN SYNDROME: ICD-10-CM

## 2024-10-09 DIAGNOSIS — E78.5 HYPERLIPIDEMIA, UNSPECIFIED HYPERLIPIDEMIA TYPE: ICD-10-CM

## 2024-10-09 DIAGNOSIS — I48.0 PAF (PAROXYSMAL ATRIAL FIBRILLATION): ICD-10-CM

## 2024-10-09 DIAGNOSIS — G89.29 CHRONIC BILATERAL LOW BACK PAIN WITHOUT SCIATICA: ICD-10-CM

## 2024-10-09 DIAGNOSIS — K63.5 POLYP OF COLON, UNSPECIFIED PART OF COLON, UNSPECIFIED TYPE: ICD-10-CM

## 2024-10-09 DIAGNOSIS — Z00.00 ANNUAL PHYSICAL EXAM: Primary | ICD-10-CM

## 2024-10-09 DIAGNOSIS — M25.559 HIP PAIN, UNSPECIFIED LATERALITY: ICD-10-CM

## 2024-10-09 DIAGNOSIS — I10 ESSENTIAL HYPERTENSION: Chronic | ICD-10-CM

## 2024-10-09 DIAGNOSIS — T45.1X5A AROMATASE INHIBITOR-ASSOCIATED ARTHRALGIA: ICD-10-CM

## 2024-10-09 DIAGNOSIS — M85.89 OSTEOPENIA OF MULTIPLE SITES: ICD-10-CM

## 2024-10-09 DIAGNOSIS — R22.1 MASS OF LEFT SIDE OF NECK: ICD-10-CM

## 2024-10-09 DIAGNOSIS — R63.4 WEIGHT LOSS: ICD-10-CM

## 2024-10-09 DIAGNOSIS — M25.50 AROMATASE INHIBITOR-ASSOCIATED ARTHRALGIA: ICD-10-CM

## 2024-10-09 DIAGNOSIS — M54.50 CHRONIC BILATERAL LOW BACK PAIN WITHOUT SCIATICA: ICD-10-CM

## 2024-10-09 DIAGNOSIS — Z86.39 HISTORY OF IRON DEFICIENCY: ICD-10-CM

## 2024-10-09 DIAGNOSIS — D84.9 IMMUNODEFICIENCY: ICD-10-CM

## 2024-10-09 DIAGNOSIS — D83.9 COMMON VARIABLE IMMUNODEFICIENCY: ICD-10-CM

## 2024-10-09 DIAGNOSIS — I70.0 ATHEROSCLEROSIS OF AORTA: ICD-10-CM

## 2024-10-09 DIAGNOSIS — D50.9 IRON DEFICIENCY ANEMIA, UNSPECIFIED IRON DEFICIENCY ANEMIA TYPE: ICD-10-CM

## 2024-10-09 DIAGNOSIS — Z17.0 CARCINOMA OF UPPER-OUTER QUADRANT OF RIGHT BREAST IN FEMALE, ESTROGEN RECEPTOR POSITIVE: Primary | ICD-10-CM

## 2024-10-09 PROCEDURE — 90653 IIV ADJUVANT VACCINE IM: CPT | Mod: S$GLB,,, | Performed by: INTERNAL MEDICINE

## 2024-10-09 PROCEDURE — G2211 COMPLEX E/M VISIT ADD ON: HCPCS | Mod: S$GLB,,, | Performed by: NURSE PRACTITIONER

## 2024-10-09 PROCEDURE — 3008F BODY MASS INDEX DOCD: CPT | Mod: CPTII,S$GLB,, | Performed by: INTERNAL MEDICINE

## 2024-10-09 PROCEDURE — 99397 PER PM REEVAL EST PAT 65+ YR: CPT | Mod: S$GLB,,, | Performed by: INTERNAL MEDICINE

## 2024-10-09 PROCEDURE — 3288F FALL RISK ASSESSMENT DOCD: CPT | Mod: CPTII,S$GLB,, | Performed by: INTERNAL MEDICINE

## 2024-10-09 PROCEDURE — 1101F PT FALLS ASSESS-DOCD LE1/YR: CPT | Mod: CPTII,S$GLB,, | Performed by: NURSE PRACTITIONER

## 2024-10-09 PROCEDURE — 3078F DIAST BP <80 MM HG: CPT | Mod: CPTII,S$GLB,, | Performed by: INTERNAL MEDICINE

## 2024-10-09 PROCEDURE — 91320 SARSCV2 VAC 30MCG TRS-SUC IM: CPT | Mod: S$GLB,,, | Performed by: INTERNAL MEDICINE

## 2024-10-09 PROCEDURE — 3074F SYST BP LT 130 MM HG: CPT | Mod: CPTII,S$GLB,, | Performed by: INTERNAL MEDICINE

## 2024-10-09 PROCEDURE — 3075F SYST BP GE 130 - 139MM HG: CPT | Mod: CPTII,S$GLB,, | Performed by: NURSE PRACTITIONER

## 2024-10-09 PROCEDURE — 4010F ACE/ARB THERAPY RXD/TAKEN: CPT | Mod: CPTII,S$GLB,, | Performed by: INTERNAL MEDICINE

## 2024-10-09 PROCEDURE — G0008 ADMIN INFLUENZA VIRUS VAC: HCPCS | Mod: S$GLB,,, | Performed by: INTERNAL MEDICINE

## 2024-10-09 PROCEDURE — 1101F PT FALLS ASSESS-DOCD LE1/YR: CPT | Mod: CPTII,S$GLB,, | Performed by: INTERNAL MEDICINE

## 2024-10-09 PROCEDURE — 90480 ADMN SARSCOV2 VAC 1/ONLY CMP: CPT | Mod: S$GLB,,, | Performed by: INTERNAL MEDICINE

## 2024-10-09 PROCEDURE — 99999 PR PBB SHADOW E&M-EST. PATIENT-LVL III: CPT | Mod: PBBFAC,,, | Performed by: INTERNAL MEDICINE

## 2024-10-09 PROCEDURE — 1125F AMNT PAIN NOTED PAIN PRSNT: CPT | Mod: CPTII,S$GLB,, | Performed by: INTERNAL MEDICINE

## 2024-10-09 PROCEDURE — 1126F AMNT PAIN NOTED NONE PRSNT: CPT | Mod: CPTII,S$GLB,, | Performed by: NURSE PRACTITIONER

## 2024-10-09 PROCEDURE — 3078F DIAST BP <80 MM HG: CPT | Mod: CPTII,S$GLB,, | Performed by: NURSE PRACTITIONER

## 2024-10-09 PROCEDURE — 4010F ACE/ARB THERAPY RXD/TAKEN: CPT | Mod: CPTII,S$GLB,, | Performed by: NURSE PRACTITIONER

## 2024-10-09 PROCEDURE — 3288F FALL RISK ASSESSMENT DOCD: CPT | Mod: CPTII,S$GLB,, | Performed by: NURSE PRACTITIONER

## 2024-10-09 PROCEDURE — 99999 PR PBB SHADOW E&M-EST. PATIENT-LVL V: CPT | Mod: PBBFAC,,, | Performed by: NURSE PRACTITIONER

## 2024-10-09 PROCEDURE — 99214 OFFICE O/P EST MOD 30 MIN: CPT | Mod: S$GLB,,, | Performed by: NURSE PRACTITIONER

## 2024-10-09 PROCEDURE — 1159F MED LIST DOCD IN RCRD: CPT | Mod: CPTII,S$GLB,, | Performed by: NURSE PRACTITIONER

## 2024-10-09 PROCEDURE — 3008F BODY MASS INDEX DOCD: CPT | Mod: CPTII,S$GLB,, | Performed by: NURSE PRACTITIONER

## 2024-10-09 RX ORDER — TRAMADOL HYDROCHLORIDE 50 MG/1
50 TABLET ORAL EVERY 6 HOURS PRN
Qty: 90 EACH | Refills: 0 | Status: SHIPPED | OUTPATIENT
Start: 2024-10-09

## 2024-10-09 RX ORDER — EXEMESTANE 25 MG/1
25 TABLET ORAL DAILY
Qty: 30 TABLET | Refills: 11 | Status: SHIPPED | OUTPATIENT
Start: 2024-10-09

## 2024-10-09 RX ORDER — ATORVASTATIN CALCIUM 80 MG/1
80 TABLET, FILM COATED ORAL DAILY
Qty: 90 TABLET | Refills: 3 | Status: SHIPPED | OUTPATIENT
Start: 2024-10-09

## 2024-10-09 NOTE — PROGRESS NOTES
Subjective     Patient ID: Bhavna Landry is a 69 y.o. female.    Chief Complaint: Annual Exam    HPI  Dx with Common Variable Immunodef Dis.      Can't afford Hizentra..      BP often too low - syncopal episode in August.        Went to ED. BP 93/51.  Up to 150-160.  AMlodipine decr to 5 mg bid prior to ED visit.        I saw her late August - diovan started, clonidine and losartan stopped.  BP fairly well controlled since.  Dig htn readings reviewed.     Saw Cards in Sept.  /64    Chronic pain in back.  Hydrocodone not working.  She would like to restart tramadol.      Injections not helpful.    H/o karyna      Following low iron sat at 11 on  9/27.  She restarted iron, despite my thought to observe.           Not anemic.  She feels better taking iron.  Neg egd and colonoscopy.  Sm Bowel endoscopy to be considered.    Hyperlipidemia.  She stopped statin for unclear reasons.  Review of Systems   Constitutional:  Negative for fever and unexpected weight change.   HENT:  Negative for nasal congestion and postnasal drip.    Eyes:  Negative for pain, discharge and visual disturbance.   Respiratory:  Negative for cough, chest tightness, shortness of breath and wheezing.    Cardiovascular:  Negative for chest pain and leg swelling.   Gastrointestinal:  Negative for abdominal pain, constipation, diarrhea and nausea.   Genitourinary:  Negative for difficulty urinating, dysuria and hematuria.   Integumentary:  Negative for rash.   Neurological:  Negative for headaches.   Psychiatric/Behavioral:  Negative for dysphoric mood and sleep disturbance. The patient is not nervous/anxious.           Objective     Physical Exam  Constitutional:       General: She is not in acute distress.     Appearance: She is well-developed.   HENT:      Head: Normocephalic and atraumatic.      Right Ear: External ear normal.      Left Ear: External ear normal.      Nose: Nose normal.   Eyes:      General: No scleral icterus.        Right eye:  No discharge.         Left eye: No discharge.      Conjunctiva/sclera: Conjunctivae normal.      Pupils: Pupils are equal, round, and reactive to light.   Neck:      Thyroid: No thyromegaly.      Vascular: No JVD.   Cardiovascular:      Rate and Rhythm: Normal rate and regular rhythm.      Heart sounds: Normal heart sounds. No murmur heard.     No gallop.   Pulmonary:      Effort: Pulmonary effort is normal. No respiratory distress.      Breath sounds: Normal breath sounds. No wheezing or rales.   Abdominal:      General: Bowel sounds are normal. There is no distension.      Palpations: Abdomen is soft. There is no mass.      Tenderness: There is no abdominal tenderness. There is no guarding or rebound.   Musculoskeletal:         General: No tenderness. Normal range of motion.      Cervical back: Normal range of motion and neck supple.   Lymphadenopathy:      Cervical: No cervical adenopathy.   Skin:     General: Skin is warm and dry.      Findings: No rash.   Neurological:      Mental Status: She is alert and oriented to person, place, and time.      Cranial Nerves: No cranial nerve deficit.      Coordination: Coordination normal.   Psychiatric:         Behavior: Behavior normal.         Thought Content: Thought content normal.         Judgment: Judgment normal.            Assessment and Plan     1. Annual physical exam    2. Hyperlipidemia, unspecified hyperlipidemia type  -     atorvastatin (LIPITOR) 80 MG tablet; Take 1 tablet (80 mg total) by mouth once daily.  Dispense: 90 tablet; Refill: 3    3. Chronic pain syndrome    4. Essential hypertension    5. Chronic bilateral low back pain without sciatica    6. History of iron deficiency    7. Immunodeficiency  -     Ambulatory referral/consult to Pharmacy Assistance; Future; Expected date: 10/16/2024    8. Common variable immunodeficiency        flu and covid  Decide on sm bowel endoscopy after next labs       Follow up in about 3 months (around 1/9/2025) for  virtual visit.    Pharmacy assistance program for Hizentra    Start Tramadol - stop if not effective    Restart statin

## 2024-10-09 NOTE — PROGRESS NOTES
PROGRESS NOTE    Subjective:       Patient ID: Bhavna Landry is a 69 y.o. female.  MRN: [id number]  : 1955    Chief Complaint: 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    pT1b N0 (i+)  grade 2 ER + PA - HER2 - IDC of the right breast.    History of Present Illness:   Bhavna Landry is a 69 y.o. female who is referred for right breast IDC. She is a former patient of Dr. Salcedo.     Per Dr. Marcelino's note:   She presented for screening mammogram on 2022 . This identified an asymmetry in the right breast. Follow-up mammogram and ultrasound on 2022 showed a 1 x 0.9 x 0.8 cm mass at the 9 o'clock position of the right breast 3 cm from the nipple. A ultrasound guided biopsy was performed on 2022 with pathology revealing invasive ductal carcinoma of the breast.     She saw Dr. Dhillon and underwent right mastectomy and sentinel node exam.  It showed IDC 8 mm, grade 2, ER strongly positive, PA negative, HER2/jayla 1+, Ki-67 12%.  There was also DCIS, multiple foci up to 8 mm, spanning a distance of 20 mm.  Nuclear grade 2. Two sentinel nodes removed, 1 had isolated tumor cells.    Oncotype DX RS 28; Chemo benefit >15%     She has declined adjuvant chemotherapy.     Started Anastrozole  22, switched to exemestane due to arthralgias.     Interim history:   She presents today for regular follow-up and urgent care visit.  She was seeing her allergist a few weeks ago and the allergist felt a lump to her left neck under her chin.  The allergist messaged me to see if I could see her sooner than scheduled.  Per pt, she has been having a lump/swelling off and on for about one year in that area.  Typically when she is sick, but now the lump has just persisted.  Sometimes it is tender, but not always.  No recent fever.  98 today in clinic, but she is typically 97.  Does report dizziness with blood pressure swings.  She saw her pcp today as  well.      Tolerating exemestane fairly well, taking daily. No night sweats, has stable chronic joint pains.     Diffuse joint pains, no exacerbation with exemestane. Continues Tramadol. Will be getting an epidural with pain management tomorrow for low back pain with sciatica    In the past was diagnosed with TONIE and B12 def. On B12, completed iron. Had a colonoscopy in Dec,(sessile polyps)  had EGD in February that showed gastritis.     Weight and appetite are improving. Eating and drinking well, bowel movements are normal (takes stools softeners),     Survivorship  Cardiac toxicity: no chest pain or sob    Bone Health: takes vitamin D, does not take calcium currently because has had high calcium levels in the past, last bone density was 3/18/24, on zometa every 6 months    Anxiety/depression/ptsd: mood stable, followed by Dr. Poe with psychiatry    Cognitive function: lives with daughter, son in law and grandchildren, granddaughter drove her here today, does report some memory concerns    Fatigue: low energy level    Lymphedema: no swelling in arms    Pain: has chronic generalized arthralgias, see above    Sleep Disorder: sleeping ok at night, but intermittently wakes up at 2 am and has trouble falling back asleep    Healthy Lifestyle: weight/diet/exercise/social/spiritual connections    Health Maintenance: mammogram is scheduled for 6/3/24, Had a colonoscopy in Dec,(sessile polyps)  had EGD in February that showed gastritis. PCP is Dr. Vargas    Oncology History:  1. Breast, right, mastectomy:   - Invasive ductal carcinoma       - Greatest dimension:  8 mm       - Jonathan-Alaniz modified SBR score 3 + 2 + 1 = 6 of 9       - Histologic grade 2 of 3       - Mitotic index = 0.2 (average mitoses per high power field) (low)       - Resection margin free of invasive carcinoma           - Invasive carcinoma present 9 mm to closest posterior margin, 26 mm   to inferior margin, and 45 mm to superior margin   - Biopsy  "clip present   - No lymphovascular invasion is identified   - Ductal carcinoma in situ       - Foci up to 8 mm in greatest dimension spanning a distance of   approximately 20 mm       - Nuclear grade ranges from 2 to 3 of 3       - Solid and cribriform patterns       - Central comedonecrosis present:  Approximately 80%       - Resection margin free of DCIS           - DCIS present present 9 mm to closest posterior margin, 26 mm to   inferior margin, and 45 mm to superior margin   - Background breast contains usual ductal hyperplasia, apocrine metaplasia,   microcysts and macrocysts, ectatic ducts, and stromal fibrosis   - Microcalcifications, calcium phosphate type, associated with DCIS and   focally with medial calcific arterial stenosis (Monckeberg's sclerosis)   - See CAP Tumor Synoptic   2. Carlos lymph node, right axillary, "#1 hot and blue count 2004,"   excision:   - 1 lymph node, isolated tumor cells present   3. Carlos lymph node, right axillary, "#2 hot and blue count 1700,"   excision:   - 1 lymph node, negative for metastatic carcinoma   CAP Tumor Synoptic:   Procedure:  Total mastectomy   Specimen laterality:  Right   Tumor site:  9 o'clock   Histologic type:  Invasive carcinoma of no special type (ductal)   Histologic grade (Brixey histologic score):  Brixey score 3 + 2 + 1 =   6 of 9   Glandular (acinar) / tubular differentiation:  Score 3   Nuclear pleomorphism:  Score 2   Mitotic grade:  Score 1   Overall grade:  Grade 2   Tumor size:  8 mm   Tumor focality:  Single focus of invasive carcinoma   Ductal carcinoma in situ (DCIS):  Present (Negative for extensive intraductal   component)       Size (extent) of DCIS:  Foci up to 8 mm in greatest dimension spanning a   distance of approximately 20 mm       Number blocks with DCIS:  4       Number blocks examined:  13       Architectural patterns:  Comedo, cribriform, solid       Nuclear grade:  Ranges from grade 2 (intermediate) to grade 3 " (high)   Lobular carcinoma in situ (LCIS):  Not identified   Tumor extent:  Not applicable   Lymphovascular invasion:  Not identified   Dermal lymphovascular invasion:  Not identified   Microcalcifications:  Present in DCIS and nonneoplastic tissue   Treatment effect in the breast:  No known pre-surgical therapy   Treatment effect in the lymph nodes:  Not applicable   Margin status for invasive carcinoma:  All margins negative for invasive   carcinoma       Distance from invasive carcinoma closest posterior margin:  9 mm       Distance from invasive carcinoma to inferior margin:  26 mm       Distance from invasive carcinoma to superior margin:  45 mm   Margin status for DCIS:  All margins negative for DCIS       Distant from DCIS to closest posterior margin:  9 mm       Distance from DCIS to inferior margin:  26 mm       Distance from DCIS to superior margin:  45 mm   Regional lymph node status:  Tumor present in regional lymph nodes   Number of lymph nodes with macrometastases:  0   Number of lymph nodes with micrometastases:  0   Number of lymph nodes with isolated tumor cells:  1   Size of largest jose l metastatic deposit:  0.13 mm   Extranodal extension:  Not identified   Total number of lymph nodes examined (sentinel and nonsentinel):  2   Number of sentinel nodes examined:  2   Distant sites involved:  Not applicable   TNM descriptors:  Not applicable   pT category:  pT1b   Regional lymph nodes modifier:  (sn)   pN category:  (sn) pN0 (i+)   pM category:  Not applicable - pM cannot be determined from the submitted   specimens   Special studies:  Biomarkers, assessed by immunohistochemistry on prior right   breast biopsy (S-) with following reported results:       - Estrogen Receptor (ER):  Positive (100%; strong intensity)       - Progesterone Receptor (ME):  Negative (<1%)       - HER2:  Negative (1+)       - Ki-67 proliferation index:  12% (low)       Oncology History   Carcinoma of upper-outer  quadrant of right breast in female, estrogen receptor positive   4/13/2022 Initial Diagnosis    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, estrogen receptor positive     4/13/2022 Cancer Staged    Staging form: Breast, AJCC 8th Edition  - Clinical stage from 4/13/2022: Stage IA (cT1b, cN0, cM0, G2, ER+, OH-, HER2-)     7/19/2022 - 7/19/2022 Chemotherapy    Treatment Summary   Plan Name: OP BREAST TC - DOCETAXEL CYCLOPHOSPHAMIDE Q3W  Treatment Goal: Curative  Status: Inactive  Start Date:   End Date:   Provider: Idania Martins MD  Chemotherapy: cyclophosphamide 600 mg/m2 = 880 mg in sodium chloride 0.9% 250 mL chemo infusion, 600 mg/m2, Intravenous, Clinic/HOD 1 time, 0 of 4 cycles  DOCEtaxeL (TAXOTERE) 75 mg/m2 = 110 mg in sodium chloride 0.9% 250 mL chemo infusion, 75 mg/m2, Intravenous, Clinic/HOD 1 time, 0 of 4 cycles         History:  Past Medical History:   Diagnosis Date    Allergy     Amblyopia     Anemia     Anticoagulant long-term use     Arthritis 02/02/1992    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, estrogen receptor positive 04/13/2022    Cataract     Depression     Dry eyes     Dry mouth     Duane's syndrome of right eye     Early dry stage nonexudative age-related macular degeneration of both eyes 09/20/2022    Fibromyalgia 04/17/2014    Fibromyalgia     Fractured hip     RIGHT HIP    GERD (gastroesophageal reflux disease)     History of psychiatric hospitalization     Hyperlipidemia 02/02/1992    Hypertension     Hypocalcemia     Hyponatremia     Kidney stone     Migraine headache     Osteoporosis     Pressure ulcer of unspecified site, unspecified stage     Psoriatic arthritis 02/02/1992    Recurrent upper respiratory infection (URI)     Right knee pain     post knee replacement surgery (possible rejectiion of metal)    RLS (restless legs syndrome)     Schizophrenia 02/02/1992    stable on meds    Sciatica     Squamous cell carcinoma of skin     Urinary tract  infection       Past Surgical History:   Procedure Laterality Date    brow ptosis repair Right 2019    Surgeon: Dr. Karla Henson    CATARACT EXTRACTION      CAUDAL EPIDURAL STEROID INJECTION N/A 2024    Procedure: Caudal SOPHY;  Surgeon: Maile Storm DO;  Location: [facility] PAIN MANAGEMENT;  Service: Pain Management;  Laterality: N/A;  ASA ok     SECTION      COLONOSCOPY N/A 2016    Procedure: COLONOSCOPY;  Surgeon: ANDRIA Connelly MD;  [facility]  Service: Endoscopy;  Laterality: N/A;    COLONOSCOPY N/A 2022    Procedure: COLONOSCOPY;  Surgeon: Mikey Walters MD;  Location: [facility];  Service: Endoscopy;  Laterality: N/A;    cyst removed from right sinus  1982    EPIDURAL STEROID INJECTION INTO CERVICAL SPINE N/A 2023    Procedure: C6-7 SOPHY;  Surgeon: Spring Jensen MD;  Location: [facility] PAIN MANAGEMENT;  Service: Pain Management;  Laterality: N/A;  20 mins    EPIDURAL STEROID INJECTION INTO LUMBAR SPINE N/A 2023    Procedure: Injection-steroid-epidural-lumbar L5-S1;  Surgeon: Chirag Kim MD;  Location: [facility] PAIN MANAGEMENT;  Service: Pain Management;  Laterality: N/A;  asa    EPIDURAL STEROID INJECTION INTO LUMBAR SPINE N/A 2024    Procedure: L5/S1 Interlaminar SOPHY;  Surgeon: Maile Storm DO;  Location: [facility] PAIN MANAGEMENT;  Service: Pain Management;  Laterality: N/A;  20mins-ASA 5d    ESOPHAGOGASTRODUODENOSCOPY N/A 2023    Procedure: EGD (ESOPHAGOGASTRODUODENOSCOPY);  Surgeon: Dougie Shea MD;  Location: [facility];  Service: Endoscopy;  Laterality: N/A;    FOOT FRACTURE SURGERY      HYSTERECTOMY      VAGINAL HYSTERECTOMY WITHOUT BSO - ENDOMETRIOSIS    INJECTION FOR SENTINEL NODE IDENTIFICATION Right 2022    Procedure: INJECTION, FOR SENTINEL NODE IDENTIFICATION-Right;  Surgeon: NEAL Dhillon MD;  Location: [facility] OR;  Service: General;  Laterality: Right;    INJECTION OF ANESTHETIC AGENT AROUND MEDIAL BRANCH  NERVES INNERVATING LUMBAR FACET JOINT N/A 04/11/2019    Procedure: Lumbo-sacral Block, DR5 and Lateral Branches of S1,S2, S3;  Surgeon: Michelle Pineda Jr., MD;  Location: [facility] PAIN MG;  Service: Pain Management;  Laterality: N/A;  Pt takes  and states she holds ASA on her own whenever she has procedures.  Instructed to hold x 3 days prior to procedure.      INJECTION OF ANESTHETIC AGENT AROUND MEDIAL BRANCH NERVES INNERVATING LUMBAR FACET JOINT Bilateral 05/03/2023    Procedure: Block-nerve-medial branch-lumbar bilateral L3, L4, L5;  Surgeon: Maile Storm DO;  Location: [facility] PAIN MGT;  Service: Pain Management;  Laterality: Bilateral;  asa    INJECTION OF ANESTHETIC AGENT INTO SACROILIAC JOINT Right 08/30/2018    Procedure: BLOCK, SACROILIAC JOINT-Right- ORAL SEDATION;  Surgeon: Michelle Pineda Jr., MD;  Location: [facility] PAIN MG;  Service: Pain Management;  Laterality: Right;    INJECTION OF ANESTHETIC AGENT INTO SACROILIAC JOINT Bilateral 09/27/2018    Procedure: BLOCK, SACROILIAC JOINT-BILATERAL;  Surgeon: Michelle Pineda Jr., MD;  Location: [facility] PAIN [facility];  Service: Pain Management;  Laterality: Bilateral;  Please keep at 10:00 due to trasnsportation    INJECTION OF ANESTHETIC AGENT INTO SACROILIAC JOINT Bilateral 02/07/2019    Procedure: Bilateral Sacroiliac Joint Injection - Per Dr Pineda, not necessary to hold ASA.;  Surgeon: Michelle Pineda Jr., MD;  Location: [facility] PAIN [facility];  Service: Pain Management;  Laterality: Bilateral;    INJECTION, SPINE, LUMBOSACRAL, TRANSFORAMINAL APPROACH Right 7/17/2024    Procedure: Right  L3/4  and L4/5 TFESI;  Surgeon: Maile Storm DO;  Location: [facility] PAIN MANAGEMENT;  Service: Pain Management;  Laterality: Right;  20 mins  ASA 5 days    INTRAOCULAR PROSTHESES INSERTION Right 08/01/2019    Procedure: INSERTION, IOL PROSTHESIS;  Surgeon: Karen Song MD;  Location: [address];  Service: Ophthalmology;  Laterality: Right;    INTRAOCULAR  PROSTHESES INSERTION Left 09/26/2019    Procedure: INSERTION, IOL PROSTHESIS;  Surgeon: Karen Song MD;  [facility];  Service: Ophthalmology;  Laterality: Left;    JOINT REPLACEMENT Right     knee    KNEE SURGERY      MASTECTOMY Right 05/09/2022    Procedure: MASTECTOMY-Right;  Surgeon: NEAL Dhillon MD;  Location: [facility] OR;  Service: General;  Laterality: Right;  2.5 HOURS    PHACOEMULSIFICATION OF CATARACT Right 08/01/2019    Procedure: PHACOEMULSIFICATION, CATARACT;  Surgeon: Karen Song MD;  Location: [facility] OR [address] [place] [address];  Service: Ophthalmology;  Laterality: Right;    PHACOEMULSIFICATION OF CATARACT Left 09/26/2019    Procedure: PHACOEMULSIFICATION, CATARACT;  Surgeon: Karen Song MD;  Location: [facility] OR [address] [place] [address];  Service: Ophthalmology;  Laterality: Left;    RADIOFREQUENCY ABLATION, NERVE, PERIPHERAL Right 04/24/2024    Procedure: RIGHT L5 Dorsal Ramus and RIGHT Lateral Branches of S1, S2, and S3;  Surgeon: Maile Storm DO;  Location: [facility] PAIN MANAGEMENT;  Service: Pain Management;  Laterality: Right;  30 mins    RADIOFREQUENCY THERMOCOAGULATION Right 05/07/2019    Procedure: RADIOFREQUENCY THERMAL COAGULATION RIGHT DORSAL RAMUS 5 AND LATERAL BRANCH OF S1, S2 AND S3;  Surgeon: Michelle Pineda Jr., MD;  Location: [facility];  Service: Pain Management;  Laterality: Right;    RADIOFREQUENCY THERMOCOAGULATION Left 05/14/2019    Procedure: RADIOFREQUENCY THERMAL COAGULATION LEFT DORSAL RAMUS 5 AND LATERAL BRANCH OF S1,S2 AND S3;  Surgeon: Michelle Pineda Jr., MD;  Location: [facility] PAIN T;  Service: Pain Management;  Laterality: Left;    RADIOFREQUENCY THERMOCOAGULATION Right 10/22/2019    Procedure: RADIOFREQUENCY THERMAL COAGULATION---DARSAL RAMUS 5 and LATERAL S1,S2,and S3 Right;  Surgeon: Michelle Pineda Jr., MD;  Location: [facility] PAIN [facility];  Service: Pain Management;  Laterality: Right;  patient to sign consent DOS    RADIOFREQUENCY THERMOCOAGULATION Left 10/29/2019     Procedure: RADIOFREQUENCY THERMAL COAGULATION - LEFT - DR5, S1,S2, AND S3;  Surgeon: Michelle Pineda Jr., MD;  Location: [facility] PAIN MG;  Service: Pain Management;  Laterality: Left;    RADIOFREQUENCY THERMOCOAGULATION Left 2020    Procedure: RADIOFREQUENCY THERMAL COAGULATION--Left DR5+ lateral branches of S1, S2, S3;  Surgeon: Michelle Pineda Jr., MD;  Location: [facility] PAIN MGT;  Service: Pain Management;  Laterality: Left;    RADIOFREQUENCY THERMOCOAGULATION Right 2020    Procedure: RADIOFREQUENCY THERMAL COAGULATION--Right DR5+ lateral branches of S1, S2, S3;  Surgeon: Michelle Pineda Jr., MD;  Location: [facility] PAIN [facility];  Service: Pain Management;  Laterality: Right;    SENTINEL LYMPH NODE BIOPSY Right 2022    Procedure: BIOPSY, LYMPH NODE, SENTINEL-Right;  Surgeon: NEAL Dhillon MD;  Location: [facility];  Service: General;  Laterality: Right;    SINUS SURGERY      Surgery on right knee  1982    TONSILLECTOMY      tumor removed from back left side upper shoulder  2006     Family History   Problem Relation Name Age of Onset    Asthma Mother      Colon cancer Mother      Psoriasis Mother      Cancer Mother          colon    Depression Mother      Cancer Father          lymphoma    Stroke Sister      Diabetes Brother x2     Arthritis Brother x2     Heart disease Brother x2         cad    No Known Problems Daughter      Hypertension Neg Hx      Suicide Neg Hx      Amblyopia Neg Hx      Blindness Neg Hx      Cataracts Neg Hx      Glaucoma Neg Hx      Macular degeneration Neg Hx      Retinal detachment Neg Hx      Strabismus Neg Hx      Eczema Neg Hx      Allergies Neg Hx        Social History     Tobacco Use    Smoking status: Every Day     Current packs/day: 0.00     Average packs/day: 0.3 packs/day for 10.0 years (2.5 ttl pk-yrs)     Types: Cigarettes     Start date: 1/10/2013     Last attempt to quit: 1/10/2023     Years since quittin.7     Passive exposure: Never    Smokeless  "tobacco: Former    Tobacco comments:     about 5 cig a day   Substance and Sexual Activity    Alcohol use: No    Drug use: No    Sexual activity: Not Currently     Partners: Male     Birth control/protection: Post-menopausal        ROS:   Review of Systems   Constitutional:  Negative for fever, malaise/fatigue and weight loss.   HENT:  Negative for congestion, ear pain, nosebleeds and sore throat.         Lump under left chin   Eyes:  Negative for blurred vision, double vision and photophobia.   Respiratory:  Negative for cough, hemoptysis, sputum production, shortness of breath, wheezing and stridor.    Cardiovascular:  Negative for chest pain, palpitations, orthopnea and leg swelling.   Gastrointestinal:  Negative for abdominal pain, blood in stool, constipation, diarrhea, heartburn, melena, nausea and vomiting.   Genitourinary:  Negative for dysuria and hematuria.   Musculoskeletal:  Positive for back pain and joint pain (left hip with recent exacerbation). Negative for myalgias and neck pain.   Skin:  Negative for itching and rash.   Neurological:  Negative for dizziness, focal weakness, seizures, weakness and headaches.   Endo/Heme/Allergies:  Negative for polydipsia. Does not bruise/bleed easily.   Psychiatric/Behavioral:  Positive for depression. Negative for memory loss. The patient is nervous/anxious. The patient does not have insomnia.         Objective:     Vitals:    10/09/24 1306   BP: 131/60   Pulse: 73   Resp: 17   Temp: 98 °F (36.7 °C)   TempSrc: Oral   SpO2: 96%   Weight: 46.1 kg (101 lb 10.1 oz)   Height: 5' 2" (1.575 m)   PainSc: 0-No pain       Physical Examination:   Physical Exam  Vitals and nursing note reviewed.   Constitutional:       General: She is not in acute distress.     Appearance: She is not diaphoretic.   HENT:      Head: Normocephalic.   Eyes:      Conjunctiva/sclera: Conjunctivae normal.   Neck:      Thyroid: No thyromegaly.      Comments: Soft lump noted to left side of neck " under chin  Cardiovascular:      Rate and Rhythm: Normal rate.   Pulmonary:      Effort: Pulmonary effort is normal.   Chest:      Comments: Hx of right mastectomy, well healed, No skin changes or breast masses noted to left breast, no  lymphadenopathy noted bilaterally    Abdominal:      General: Bowel sounds are normal.      Palpations: Abdomen is soft.   Musculoskeletal:         General: Normal range of motion.      Cervical back: Neck supple.   Skin:     General: Skin is warm and dry.   Neurological:      Mental Status: She is alert and oriented to person, place, and time.      Gait: Gait is intact.   Psychiatric:         Mood and Affect: Affect normal.         Cognition and Memory: Memory normal.         Judgment: Judgment normal.          Diagnostic Tests:  Significant Imaging: I have reviewed and interpreted all pertinent imaging results/findings.    Laboratory Data:  All pertinent labs have been reviewed.    Labs:   Lab Results   Component Value Date    WBC 8.58 08/14/2024    HGB 12.6 08/14/2024    HCT 37.2 08/14/2024    MCV 98 08/14/2024     08/14/2024       Assessment/Plan:   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, estrogen receptor positive  pT1b N0 (i+)  grade 2 ER + MA - HER2 - IDC of the right breast.    She is status post mastectomy and sentinel node exam for IDC as well as DCIS of the right breast.     Oncotype returned high risk, which confers a benefit ot the addition of chemotherapy to AI.   RSclin showed an 8% risk of recurrence with 4% absolute benefit of chemotherapy. She has declined chemotherapy.     Tolerating exemestane relatively well. Continue at this time.     Continue Zometa q 6 months for bone health and possible risk reduction for breast cancer,cleared to receive today.   Continue active surveillance. Next mammogram in June 2025, will see her back in 6 months, sooner if needed    Osteopenia of multiple sites  Diagnosed with osteopenia/osteoporosis given hip  fracture in 2020. Follows up with Dr. Cornelius and is on zoledronic acid, now receiving q 6 months.   Continues on MTX weekly  Scheduled for labs and zometa on 11/6/24  Follow up bone density done 3/2024     Hip pain   Has degenerative changes L3-L5. Declines surgical evaluation at this time. Getting SOPHY with pain management tomorrow    Aromatase inhibitor arthralgias   Continue supportive care. She declined acupuncture referral previously.     Colon Polyps  TONIE  Recent colonoscopy 12/22, path did not show any malignancy.   EGD completed 2/23  Has TONIE again, poor tolerability and response to oral iron. received 2 doses of IV venofer at [facility] in December with improvement in counts  Cbc and iron studies being monitored by pcp    Weight loss  Stabilized from 6 months ago  Continue to monitor    Atrial fibrillation   Atherosclerosis of aorta   On aspirin and lipitor, follows up with Cardiology  Continue to follow with pcp    Pain in right axilla  Mild pain to right axilla, hx of mastectomy  No lump felt, but will get ultrasound for reasurance  -     US Axilla Only (Breast Imaging) Right; Future    Mass of left side of neck  - soft mobile mass felt to left neck,  possibly gland swelling  - will get neck ultrasound for further eval  -     US Soft Tissue Head Neck; Future    ECOG SCORE    1 - Restricted in strenuous activity-ambulatory and able to carry out work of a light nature       Total time of this visit, including time spent face to face with patient and/or via video/audio, and also in preparing for today's visit for MDM and documentation. (Medical Decision Making, including consideration of possible diagnoses, management options, complex medical record review, review of diagnostic tests and information, consideration and discussion of significant complications based on comorbidities, and discussion with providers involved with the care of the patient) is 30 minutes. Greater than 50% was spent face to face with the  patient counseling and coordinating care.    Cheyanne Smith NP    Discussion:         Med Onc Chart Routing  Urgent    Follow up with physician    Follow up with LATOYA 6 months. for follow up (can cancel provider visit in November and schedule for April)   Infusion scheduling note   zometa every 6 months   Injection scheduling note    Labs   Scheduling:  Preferred lab:  Lab interval:  Labs scheduled   Imaging Other   left neck ultrasond and right axillary ultrasound now   Pharmacy appointment    Other referrals

## 2024-10-10 ENCOUNTER — TELEPHONE (OUTPATIENT)
Dept: PHARMACY | Facility: CLINIC | Age: 69
End: 2024-10-10
Payer: MEDICARE

## 2024-10-10 ENCOUNTER — PROCEDURE VISIT (OUTPATIENT)
Dept: PODIATRY | Facility: CLINIC | Age: 69
End: 2024-10-10
Payer: MEDICARE

## 2024-10-10 VITALS
WEIGHT: 101.63 LBS | OXYGEN SATURATION: 98 % | HEART RATE: 73 BPM | DIASTOLIC BLOOD PRESSURE: 60 MMHG | BODY MASS INDEX: 18.7 KG/M2 | HEIGHT: 62 IN | SYSTOLIC BLOOD PRESSURE: 131 MMHG

## 2024-10-10 DIAGNOSIS — M79.674 PAIN OF TOES OF BOTH FEET: ICD-10-CM

## 2024-10-10 DIAGNOSIS — M76.821 POSTERIOR TIBIAL TENDON DYSFUNCTION (PTTD) OF BOTH LOWER EXTREMITIES: ICD-10-CM

## 2024-10-10 DIAGNOSIS — M79.671 CHRONIC PAIN OF BOTH FEET: ICD-10-CM

## 2024-10-10 DIAGNOSIS — M21.41 PES PLANUS OF BOTH FEET: ICD-10-CM

## 2024-10-10 DIAGNOSIS — B35.1 TINEA UNGUIUM: Primary | ICD-10-CM

## 2024-10-10 DIAGNOSIS — M21.42 PES PLANUS OF BOTH FEET: ICD-10-CM

## 2024-10-10 DIAGNOSIS — M79.675 PAIN OF TOES OF BOTH FEET: ICD-10-CM

## 2024-10-10 DIAGNOSIS — M76.822 POSTERIOR TIBIAL TENDON DYSFUNCTION (PTTD) OF BOTH LOWER EXTREMITIES: ICD-10-CM

## 2024-10-10 DIAGNOSIS — M79.672 CHRONIC PAIN OF BOTH FEET: ICD-10-CM

## 2024-10-10 DIAGNOSIS — G89.29 CHRONIC PAIN OF BOTH FEET: ICD-10-CM

## 2024-10-10 NOTE — TELEPHONE ENCOUNTER
We have reviewed Ms. Landry current medication list and/or insurance status. Unfortunately, The Pharmacy Patient Assistance Team is unable to assist at this time due to the following reasons      The Pharmacy Patient Assistance Team does not assist with specialty medication. Please send a new prescription to [facility] to initiate assistance and follow up with the Specialty Assistance Team.          Yas Knight  Pharmacy Patient Assistance Team

## 2024-10-10 NOTE — PROGRESS NOTES
Subjective:     Patient    Bhavna Landry is a 69 y.o. female.    Problem    08/24/23: Presents for thick discolored toenails which grow into her skin and cause pain. There is pain associated with the nails, worse at right 1st nail. The right 2nd toe recently split/crumbled. She has psoriasis and is unsure if her nail changes are related to psoriasis or fungus. Also reports left anterior ankle swelling.     06/07/24: Returns for routine foot care for painful fungal nails. Also now with progressive flatfoot deformity right worse than left, has attempted change in footwear and OTC arch supports without significant improvement.     10/10/24: Returns for routine foot care for tinea unguium causing pain, worst at right 1st toenail. At last visit ordered custom FOs for painful PTTD, she lost the prescription.     History    History obtained from patient and review of medical records.     Past Medical History:   Diagnosis Date    Allergy     Amblyopia     Anemia     Anticoagulant long-term use     Arthritis 02/02/1992    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, estrogen receptor positive 04/13/2022    Cataract     Depression     Dry eyes     Dry mouth     Duane's syndrome of right eye     Early dry stage nonexudative age-related macular degeneration of both eyes 09/20/2022    Fibromyalgia 04/17/2014    Fibromyalgia     Fractured hip     RIGHT HIP    GERD (gastroesophageal reflux disease)     History of psychiatric hospitalization     Hyperlipidemia 02/02/1992    Hypertension     Hypocalcemia     Hyponatremia     Kidney stone     Migraine headache     Osteoporosis     Pressure ulcer of unspecified site, unspecified stage     Psoriatic arthritis 02/02/1992    Recurrent upper respiratory infection (URI)     Right knee pain     post knee replacement surgery (possible rejectiion of metal)    RLS (restless legs syndrome)     Schizophrenia 02/02/1992    stable on meds    Sciatica     Squamous cell  carcinoma of skin     Urinary tract infection        Past Surgical History:   Procedure Laterality Date    brow ptosis repair Right 2019    Surgeon: Dr. Karla Henson    CATARACT EXTRACTION      CAUDAL EPIDURAL STEROID INJECTION N/A 2024    Procedure: Caudal SOPHY;  Surgeon: Maile Storm DO;  Location: [facility] PAIN MANAGEMENT;  Service: Pain Management;  Laterality: N/A;  ASA ok     SECTION      COLONOSCOPY N/A 2016    Procedure: COLONOSCOPY;  Surgeon: ANDRIA Connelly MD;  [facility]  Service: Endoscopy;  Laterality: N/A;    COLONOSCOPY N/A 2022    Procedure: COLONOSCOPY;  Surgeon: Mikey Walters MD;  Location: [facility];  Service: Endoscopy;  Laterality: N/A;    cyst removed from right sinus  1982    EPIDURAL STEROID INJECTION INTO CERVICAL SPINE N/A 2023    Procedure: C6-7 SOPHY;  Surgeon: Spring Jensen MD;  Location: [facility] PAIN MANAGEMENT;  Service: Pain Management;  Laterality: N/A;  20 mins    EPIDURAL STEROID INJECTION INTO LUMBAR SPINE N/A 2023    Procedure: Injection-steroid-epidural-lumbar L5-S1;  Surgeon: Chirag Kim MD;  Location: [facility] PAIN MANAGEMENT;  Service: Pain Management;  Laterality: N/A;  asa    EPIDURAL STEROID INJECTION INTO LUMBAR SPINE N/A 2024    Procedure: L5/S1 Interlaminar SOPHY;  Surgeon: Maile Storm DO;  Location: [facility] PAIN MANAGEMENT;  Service: Pain Management;  Laterality: N/A;  20mins-ASA 5d    ESOPHAGOGASTRODUODENOSCOPY N/A 2023    Procedure: EGD (ESOPHAGOGASTRODUODENOSCOPY);  Surgeon: Dougie Shea MD;  Location: [facility];  Service: Endoscopy;  Laterality: N/A;    FOOT FRACTURE SURGERY      HYSTERECTOMY      VAGINAL HYSTERECTOMY WITHOUT BSO - ENDOMETRIOSIS    INJECTION FOR SENTINEL NODE IDENTIFICATION Right 2022    Procedure: INJECTION, FOR SENTINEL NODE IDENTIFICATION-Right;  Surgeon: NEAL Dhillon MD;  Location: [facility] OR;  Service: General;  Laterality: Right;    INJECTION OF  ANESTHETIC AGENT AROUND MEDIAL BRANCH NERVES INNERVATING LUMBAR FACET JOINT N/A 04/11/2019    Procedure: Lumbo-sacral Block, DR5 and Lateral Branches of S1,S2, S3;  Surgeon: Michelle Pineda Jr., MD;  Location: [facility] PAIN MGT;  Service: Pain Management;  Laterality: N/A;  Pt takes  and states she holds ASA on her own whenever she has procedures.  Instructed to hold x 3 days prior to procedure.      INJECTION OF ANESTHETIC AGENT AROUND MEDIAL BRANCH NERVES INNERVATING LUMBAR FACET JOINT Bilateral 05/03/2023    Procedure: Block-nerve-medial branch-lumbar bilateral L3, L4, L5;  Surgeon: Maile Storm DO;  Location: [facility] PAIN MGT;  Service: Pain Management;  Laterality: Bilateral;  asa    INJECTION OF ANESTHETIC AGENT INTO SACROILIAC JOINT Right 08/30/2018    Procedure: BLOCK, SACROILIAC JOINT-Right- ORAL SEDATION;  Surgeon: Michelle Pineda Jr., MD;  Location: [facility] PAIN MGT;  Service: Pain Management;  Laterality: Right;    INJECTION OF ANESTHETIC AGENT INTO SACROILIAC JOINT Bilateral 09/27/2018    Procedure: BLOCK, SACROILIAC JOINT-BILATERAL;  Surgeon: Michelle Pineda Jr., MD;  Location: [facility] PAIN MGT;  Service: Pain Management;  Laterality: Bilateral;  Please keep at 10:00 due to trasnsportation    INJECTION OF ANESTHETIC AGENT INTO SACROILIAC JOINT Bilateral 02/07/2019    Procedure: Bilateral Sacroiliac Joint Injection - Per Dr Pineda, not necessary to hold ASA.;  Surgeon: Michelle Pineda Jr., MD;  Location: [facility] PAIN MGT;  Service: Pain Management;  Laterality: Bilateral;    INJECTION, SPINE, LUMBOSACRAL, TRANSFORAMINAL APPROACH Right 7/17/2024    Procedure: Right  L3/4  and L4/5 TFESI;  Surgeon: Maile Storm DO;  Location: [facility] PAIN MANAGEMENT;  Service: Pain Management;  Laterality: Right;  20 mins  ASA 5 days    INTRAOCULAR PROSTHESES INSERTION Right 08/01/2019    Procedure: INSERTION, IOL PROSTHESIS;  Surgeon: Karen Song MD;  Location: [facility] OR [address] [place] [address];  Service: Ophthalmology;   Laterality: Right;    INTRAOCULAR PROSTHESES INSERTION Left 09/26/2019    Procedure: INSERTION, IOL PROSTHESIS;  Surgeon: Karen Song MD;  Location: [facility] OR [address] [place] [address];  Service: Ophthalmology;  Laterality: Left;    JOINT REPLACEMENT Right     knee    KNEE SURGERY      MASTECTOMY Right 05/09/2022    Procedure: MASTECTOMY-Right;  Surgeon: NEAL Dhillon MD;  Location: [facility] OR;  Service: General;  Laterality: Right;  2.5 HOURS    PHACOEMULSIFICATION OF CATARACT Right 08/01/2019    Procedure: PHACOEMULSIFICATION, CATARACT;  Surgeon: Karen Song MD;  Location: [facility] OR [address] [place] [address];  Service: Ophthalmology;  Laterality: Right;    PHACOEMULSIFICATION OF CATARACT Left 09/26/2019    Procedure: PHACOEMULSIFICATION, CATARACT;  Surgeon: Karen Song MD;  Location: [facility] OR [address] [place] [address];  Service: Ophthalmology;  Laterality: Left;    RADIOFREQUENCY ABLATION, NERVE, PERIPHERAL Right 04/24/2024    Procedure: RIGHT L5 Dorsal Ramus and RIGHT Lateral Branches of S1, S2, and S3;  Surgeon: Maile Storm DO;  Location: [facility] PAIN MANAGEMENT;  Service: Pain Management;  Laterality: Right;  30 mins    RADIOFREQUENCY THERMOCOAGULATION Right 05/07/2019    Procedure: RADIOFREQUENCY THERMAL COAGULATION RIGHT DORSAL RAMUS 5 AND LATERAL BRANCH OF S1, S2 AND S3;  Surgeon: Michelle Pineda Jr., MD;  Location: [facility] PAIN T;  Service: Pain Management;  Laterality: Right;    RADIOFREQUENCY THERMOCOAGULATION Left 05/14/2019    Procedure: RADIOFREQUENCY THERMAL COAGULATION LEFT DORSAL RAMUS 5 AND LATERAL BRANCH OF S1,S2 AND S3;  Surgeon: Michelle Pineda Jr., MD;  Location: [facility] PAIN MGT;  Service: Pain Management;  Laterality: Left;    RADIOFREQUENCY THERMOCOAGULATION Right 10/22/2019    Procedure: RADIOFREQUENCY THERMAL COAGULATION---DARSAL RAMUS 5 and LATERAL S1,S2,and S3 Right;  Surgeon: Michelle Pineda Jr., MD;  Location: [facility] PAIN MGT;  Service: Pain Management;  Laterality: Right;  patient to sign consent DOS    RADIOFREQUENCY  THERMOCOAGULATION Left 10/29/2019    Procedure: RADIOFREQUENCY THERMAL COAGULATION - LEFT - DR5, S1,S2, AND S3;  Surgeon: Michelle Pineda Jr., MD;  Location: [facility];  Service: Pain Management;  Laterality: Left;    RADIOFREQUENCY THERMOCOAGULATION Left 05/26/2020    Procedure: RADIOFREQUENCY THERMAL COAGULATION--Left DR5+ lateral branches of S1, S2, S3;  Surgeon: Michelle Pineda Jr., MD;  Location: [facility];  Service: Pain Management;  Laterality: Left;    RADIOFREQUENCY THERMOCOAGULATION Right 06/02/2020    Procedure: RADIOFREQUENCY THERMAL COAGULATION--Right DR5+ lateral branches of S1, S2, S3;  Surgeon: Michelle Pineda Jr., MD;  Location: [facility];  Service: Pain Management;  Laterality: Right;    SENTINEL LYMPH NODE BIOPSY Right 05/09/2022    Procedure: BIOPSY, LYMPH NODE, SENTINEL-Right;  Surgeon: NEAL Dhillon MD;  Location: [facility];  Service: General;  Laterality: Right;    SINUS SURGERY      Surgery on right knee  07/09/1982    TONSILLECTOMY      tumor removed from back left side upper shoulder  03/17/2006        Objective:     Vitals  Wt Readings from Last 1 Encounters:   10/10/24 46.1 kg (101 lb 10.1 oz)     Temp Readings from Last 1 Encounters:   10/09/24 98 °F (36.7 °C) (Oral)     BP Readings from Last 1 Encounters:   10/10/24 131/60     Pulse Readings from Last 1 Encounters:   10/10/24 73       Dermatological Exam    Skin:  Pedal hair growth, skin color, and skin texture normal on right  Pedal hair growth, skin color, and skin texture normal on left    Nails:  Bilateral 1st and right 2nd nail(s) thickened and bilateral 1st and right 2nd  nail(s) discolored; pain on palpation of nails, worse at medial and lateral borders of 1st toenails worst on right      Vascular Exam    Arteries:  Posterior tibial artery palpable on right  Dorsalis pedis artery palpable on right  Posterior tibial artery palpable on left  Dorsalis pedis artery palpable on left    Veins:  Telangectasia on  right  Telangectasia on left    Swelling:  None on right  1+ pitting on left at ankle    Neurological Exam    Blanco touch test:  6/6 sites sensed, light touch intact     Musculoskeletal Exam    Footwear:  Casual on right  Casual on left    Gait Exam:   Ambulatory Status: Ambulatory  Gait: Apropulsive  Assistive Devices: None    Foot Progression Angle:  Normal on right  Normal on left     Right Lower Extremity Additional Findings:  Pes planus, moderate pain on palpation of navicular and distal PT tendon  Right foot and ankle function, strength, and range of motion unremarkable except as noted above.     Left Lower Extremity Additional Findings:  Pes planus, moderate pain on palpation of navicular and distal PT tendon  Left foot and ankle function, strength, and range of motion unremarkable except as noted above.    Imaging and Other Tests    Imagin23 L ankle X ray: ankle unremarkable, there is dorsal foot arthritis from talus to cuneiforms.     Independently reviewed and interpreted imaging, findings are as follows: N/A     Assessment:     Encounter Diagnoses   Name Primary?    Tinea unguium Yes    Chronic pain of both feet     Pain of toes of both feet     Posterior tibial tendon dysfunction (PTTD) of both lower extremities     Pes planus of both feet           Plan:     I counseled the patient on her conditions, their implications and medical management.    Tinea unguium, pain: chronic stable   -Discussed treatment options including (1) monitoring, (2) debridement, (3) topical antifungals, (4) oral antifungals. Discussed potential risks, benefits, alternatives to each. Patient opted for topical antifungals and debridement.  -Nails debrided x3, thickness and length reduced using sterile nail nippers, see procedure note. Other nails trimmed as a courtesy.    -Continue topical ciclopirox.    Posterior tibial tendon dysfunction, pes planus, pain: chronic exacerbated  -Reordered bilateral custom foot  orthoses.     Return to clinic in 2.5 months for routine foot care, call sooner PRN.

## 2024-10-11 ENCOUNTER — PATIENT MESSAGE (OUTPATIENT)
Dept: ALLERGY | Facility: CLINIC | Age: 69
End: 2024-10-11
Payer: MEDICARE

## 2024-10-11 ENCOUNTER — OFFICE VISIT (OUTPATIENT)
Dept: PAIN MEDICINE | Facility: CLINIC | Age: 69
End: 2024-10-11
Payer: MEDICARE

## 2024-10-11 VITALS
HEART RATE: 76 BPM | SYSTOLIC BLOOD PRESSURE: 114 MMHG | WEIGHT: 101.06 LBS | DIASTOLIC BLOOD PRESSURE: 60 MMHG | BODY MASS INDEX: 18.6 KG/M2 | HEIGHT: 62 IN

## 2024-10-11 DIAGNOSIS — M47.816 LUMBAR SPONDYLOSIS: ICD-10-CM

## 2024-10-11 DIAGNOSIS — G89.4 CHRONIC PAIN SYNDROME: ICD-10-CM

## 2024-10-11 DIAGNOSIS — M54.17 LUMBOSACRAL RADICULOPATHY: Primary | ICD-10-CM

## 2024-10-11 DIAGNOSIS — M51.362 DEGENERATION OF INTERVERTEBRAL DISC OF LUMBAR REGION WITH DISCOGENIC BACK PAIN AND LOWER EXTREMITY PAIN: ICD-10-CM

## 2024-10-11 PROCEDURE — 99999 PR PBB SHADOW E&M-EST. PATIENT-LVL IV: CPT | Mod: PBBFAC,,, | Performed by: NURSE PRACTITIONER

## 2024-10-11 RX ORDER — DIPHENHYDRAMINE HCL 25 MG
25 CAPSULE ORAL ONCE
OUTPATIENT
Start: 2024-10-11 | End: 2024-10-11

## 2024-10-11 RX ORDER — EPINEPHRINE 0.3 MG/.3ML
0.3 INJECTION SUBCUTANEOUS ONCE AS NEEDED
OUTPATIENT
Start: 2024-10-11

## 2024-10-11 RX ORDER — DIPHENHYDRAMINE HYDROCHLORIDE 50 MG/ML
50 INJECTION INTRAMUSCULAR; INTRAVENOUS ONCE AS NEEDED
OUTPATIENT
Start: 2024-10-11

## 2024-10-11 RX ORDER — SODIUM CHLORIDE 0.9 % (FLUSH) 0.9 %
10 SYRINGE (ML) INJECTION
OUTPATIENT
Start: 2024-10-11

## 2024-10-11 RX ORDER — HEPARIN 100 UNIT/ML
500 SYRINGE INTRAVENOUS
OUTPATIENT
Start: 2024-10-11

## 2024-10-11 RX ORDER — ACETAMINOPHEN 500 MG
500 TABLET ORAL ONCE
OUTPATIENT
Start: 2024-10-11 | End: 2024-10-11

## 2024-10-11 NOTE — PROGRESS NOTES
[facility] -  Established Patient Evaluation    Chief Complaint  Chief Complaint   Patient presents with    Follow-up     SP    Low-back Pain    Leg Pain           10/11/2024     2:21 PM 8/30/2024    11:04 AM 6/21/2024    10:43 AM   Last 3 PDI Scores   Pain Disability Index (PDI) 18 [phone] 48     Interval Update 10/11/2024: Patient return to clinic SP Caudal SOPHY procedure done on 09/18/2024 with 100% relief with improved functionality.    Interval update 08/30/24:  Patient return to clinic for follow up on back pain.  She reports most recent right L3/L4 and L4/L5 right-sided transforaminal epidural steroid injection on 07/17/2024 did provide her with pain relief.  Her pain is gradually returning, but she still notes her pain is better than prior to injection.  Pain radiates to the right buttocks and right leg.  She reports pain over the sacrum as well.  She tried the Lyrica 50 mg prescribed by NSGY, but stated the medication sedated her.  She was interested in trying the Lyrica again at a lower dose.  Today she rates her pain 7/10.    Interval Update 06/21/2024: Patient return to clinic for follow up on back pain.  She has status post a lumbar SOPHY targeting L5-S1 on 05/22/2024 reporting 30% relief of her symptoms her daughter who is present with her did report that she was able to walk around her home without using her walker following this injection.  Despite the percentage of relief being low.  Continues to suffer from chronic right low back and right leg pain her pain starts in the low right low back radiating into her right buttocks wrapping around to the front of her right leg into her toes.  She denies any recent incident or trauma denies any falls denies any profound weakness.  She was also previously provided a right-sided sacral lateral branch RFA and was scheduled to have the left side RFA done however her right sciatic pain has taking over her SI joint/hip pain.  I will focus my  energy on trying to alleviate her right-sided sciatica type pain.  She denies taking gabapentin anymore as she felt like the medicine was causing her ankles swell.  Today she denies any profound weakness denies any bowel bladder dysfunction denies saddle anesthesia at this time.    Interval History 05/02/2024  Bhavna Landry presents today virtually for follow up her pain back.  She recently underwent right sided sacral lateral branch RFA and was scheduled to have left sided RFA performed, but her sciatica pain has flared up in the interim.  She would like to hold off on the left sacral lateral branch RFA as her right-sided radicular symptoms are more severe.  Pain radiates down the right lower extremity to the top of the foot.  She needs to use a walker to ambulate due to pain.  She has a history of lumbar radiculopathy and underwent L5/S1 interlaminar SOPHY in 08/2023 with excellent results.  She reports her current radicular symptoms are the same as prior.  She would like to repeat the lumbar epidural steroid injection.  She is currently taking gabapentin which is prescribed by her psychiatrist.  She denies any weakness in the lower extremities, saddle anesthesia, or loss of bowel or bladder function.  She currently rates her pain 8/10.       Interval History 04/02/2024:  Bhavna Landry presents today for follow up of her low back pain.  Her low back/sacral pain has returned.  In the past she has responded well to bilateral L5 Dorsal Ramus and Lateral Branches of S1, S2, and S3 RFA.  This is previously given her significant relief lasting several years.  She would like to repeat this procedure.  Denies any recent falls or trauma.  No changes to her pain.  Denies any radicular symptoms.  No weakness, saddle anesthesia, bowel or bladder changes.     Interval History (05/22/2023):  Bhavna Landry returns today for follow up she is s/p a a diagnostic Lumbar MBB targeting L4/5 and L5/S1 that  provided 0% relief of her  "low back and leg pain R>L. She would like to consider other injection that may help. She denies and new pain, denies B/B dysfunction, denies any profound weakness.     Current Pain Scales:  Current: 5/10              Typical Range: 5-9/10     Interval History (03/14/2023):  Bhavna Landry returns today for low back pain that radiates into the b/l thighs.  Pain is described as clenching pain that has worsened over last 2 years. No trauma or surgery.  Pain is worse with extension.  Pain is better with heat, lying down, gabapentin, tylenol and tramadol.  She is scheduled to start PT next week.  Currently, the bilateral hip and bilateral leg pain is stable.  Avoid NSAIDs due to history of gastric ulcer.     Current Pain Scales:  Current: 7/10              Typical Range: 7-8/10     Background from Chart Review  9/16/2020- Mrs. Landry presents to clinic today reporting left hip pain for the past 3-4 mos, which has not improved with PT.  Pain starts in the lateral left hip "on the bone" and radiates through the buttock, down the lateral thigh not passing the knee, and into the anterolateral thigh.  She endorses regular stretching and states that this pain is distinctly different from the SI pain for which she received SI RFA in the past.     6/09/2020-   64-year-old female presents status post left then right  DR 5+ lateral branches of the S1, S2 and S3 RFA with reported 100% continued  relief she reports that her pain score today is 0/10.  She reports improvement with her ADLs and overall improvement of her functionality.     05/05/2020  presents tele-medicine appointment for a follow-up appointment for low back, left groin and bilateral hip pain.  Since the last visit, Bhavna Landry states the pain has been persistant. Current pain intensity is 9/10.  Patient reports onset of pain 2 weeks, patient states bending forward and sitting for long periods of time increase her pain.  Pain is described as aching.  She reports no " radicular symptoms in either leg.  Worst pain is not out of 10.  Patient is status post right and left L5 Dorsal Ramus and Lateral Branches of S1, S2, and S3 (4 levels) RFA reported 80-90% relief for a minimal 6 months.  Pain is now returned and patient like to reschedule procedure.        Treatment History  PT/OT: yes  HEP: yes  TENS:    Injections:   - 09/18/2024Caudal Epidural Steroid Injection 100%  - 07/17/2024-right L3/L4 and L4/L5 transforaminal epidural steroid injection  -05/22/2024 Lumbar Interlaminar Epidural Steroid Injection L5/S1 30%  Bilateral SI joint injections, bilateral  Dorsal ramus block(DR5, S1,S2,S3),  bilateral L5 Dorsal Ramus and Lateral Branches of S1, S2, and S3 (4 levels) RFA, GTB injections   12/08/2023 - Cervical Interlaminar Epidural Steroid Injection C6/C7 under Fluoroscopic Guidance   08//02/2023 -  Lumbar Interlaminar Epidural Steroid Injection L5/S1   05/03/2023 -  Diagnostic Bilateral L3, L4, and L5 Lumbar Medial Branch Block under Fluoroscopy  - No relief    Surgery:  Medications:   - NSAIDS: avoid due to gastric ulcer   - MSK Relaxants:   - TCAs:   - SNRIs: Venlafaxine  - Topicals: Voltaren  - Opioids: Tramadol   - Anticonvulsants: Gabapentin    Current Pain Medications  Gabapentin  Tramadol      Full Medication List    Current Outpatient Medications:     albuterol (PROVENTIL) 2.5 mg /3 mL (0.083 %) nebulizer solution, Take 3 mLs (2.5 mg total) by nebulization every 6 (six) hours as needed for Wheezing. Rescue, Disp: 90 each, Rfl: 2    albuterol (PROVENTIL/VENTOLIN HFA) 90 mcg/actuation inhaler, USE 2 INHALATIONS BY MOUTH 4  TIMES DAILY AS NEEDED, Disp: 34 g, Rfl: 3    amLODIPine (NORVASC) 5 MG tablet, Take 1 tablet (5 mg total) by mouth 2 (two) times a day., Disp: 180 tablet, Rfl: 1    ascorbic acid, vitamin C, (VITAMIN C) 500 MG tablet, Take 500 mg by mouth once daily., Disp: , Rfl:     aspirin (ECOTRIN) 81 MG EC tablet, Take 81 mg by mouth once daily., Disp: , Rfl:      atorvastatin (LIPITOR) 80 MG tablet, Take 1 tablet (80 mg total) by mouth once daily., Disp: 90 tablet, Rfl: 3    baclofen (LIORESAL) 10 MG tablet, 1 tab po tid for muscle cramps., Disp: 60 tablet, Rfl: 1    benztropine (COGENTIN) 0.5 MG tablet, TAKE 1 TABLET(0.5 MG) BY MOUTH TWICE DAILY, Disp: 180 tablet, Rfl: 3    budesonide-formoterol 80-4.5 mcg (SYMBICORT) 80-4.5 mcg/actuation HFAA, INHALE 2 PUFFS BY MOUTH TWICE DAILY, Disp: 30.6 g, Rfl: 2    exemestane (AROMASIN) 25 mg tablet, Take 1 tablet (25 mg total) by mouth once daily., Disp: 30 tablet, Rfl: 11    fluticasone propionate (FLONASE) 50 mcg/actuation nasal spray, 1 spray by Each Nostril route once daily., Disp: , Rfl:     folic acid (FOLVITE) 1 MG tablet, Take 1 tablet (1,000 mcg total) by mouth once daily., Disp: 90 tablet, Rfl: 3    HYDROcodone-acetaminophen (NORCO) 5-325 mg per tablet, Take 1 tablet by mouth every 12 (twelve) hours as needed for Pain. (Patient not taking: Reported on 10/9/2024), Disp: 60 tablet, Rfl: 0    immun glob G,IgG,-pro-IgA 0-50 (HIZENTRA) 4 gram/20 mL (20 %) Soln, Inject 60 mLs (12 g total) into the skin every 14 (fourteen) days., Disp: 120 mL, Rfl: 11    isosorbide mononitrate (IMDUR) 30 MG 24 hr tablet, Take 1 tablet (30 mg total) by mouth once daily., Disp: 90 tablet, Rfl: 3    LIDOcaine (LIDODERM) 5 %, Place 1 patch onto the skin once daily. Remove & Discard patch within 12 hours or as directed by MD, Disp: 30 patch, Rfl: 0    meloxicam (MOBIC) 7.5 MG tablet, Take 1 tablet (7.5 mg total) by mouth once daily. (Patient not taking: Reported on 10/9/2024), Disp: 14 tablet, Rfl: 0    methotrexate 2.5 MG Tab, Take 8 tablets (20 mg total) by mouth every 7 days. TAKE 4 TABLETS MONDAY MORNING AND TAKE 4 TABLETS MONDAY NIGHT ONLY, Disp: 96 tablet, Rfl: 0    omega-3 fatty acids/fish oil (FISH OIL-OMEGA-3 FATTY ACIDS) 300-1,000 mg capsule, Take by mouth once daily., Disp: , Rfl:     omeprazole (PRILOSEC) 40 MG capsule, Take 1 capsule (40  mg total) by mouth every morning., Disp: 90 capsule, Rfl: 3    pregabalin (LYRICA) 25 MG capsule, TAKE 1 CAPSULE BY MOUTH EVERY EVENING, Disp: 30 capsule, Rfl: 2    pregabalin (LYRICA) 50 MG capsule, TAKE 1 CAPSULE EVERY EVENING, Disp: 30 capsule, Rfl: 2    risperiDONE (RISPERDAL) 2 MG tablet, TAKE 1 TABLET(2 MG) BY MOUTH EVERY MORNING, Disp: 90 tablet, Rfl: 3    risperiDONE (RISPERDAL) 4 MG tablet, TAKE 1 TABLET(4 MG) BY MOUTH EVERY EVENING, Disp: 90 tablet, Rfl: 3    traMADoL (ULTRAM) 50 mg tablet, Take 1 tablet (50 mg total) by mouth every 6 (six) hours as needed for Pain., Disp: 90 each, Rfl: 0    valsartan (DIOVAN) 80 MG tablet, 1 tab po q hs, Disp: 90 tablet, Rfl: 0    venlafaxine (EFFEXOR-XR) 75 MG 24 hr capsule, Take 1 capsule (75 mg total) by mouth once daily., Disp: 90 capsule, Rfl: 3    vitamin D (VITAMIN D3) 1000 units Tab, Take 1,000 Units by mouth once daily., Disp: , Rfl:     vitamin E 200 UNIT capsule, Take 200 Units by mouth once daily., Disp: , Rfl:     zinc gluconate 50 mg tablet, Take 50 mg by mouth once daily., Disp: , Rfl:   No current facility-administered medications for this visit.    Facility-Administered Medications Ordered in Other Visits:     tropicamide 1% ophthalmic solution 1 drop, 1 drop, Right Eye, On Call Procedure, Karen Song MD, 1 drop at 08/01/19 0648     Review of Systems  ROS  REVIEW OF SYSTEMS:    GENERAL:  No weight loss, malaise or fevers.  HEENT:   No recent changes in vision or hearing  NECK:  No difficulty with swallowing. No stridor.   RESPIRATORY:  Negative for cough, wheezing or shortness of breath, patient denies any recent URI.  CARDIOVASCULAR:  Negative for chest pain, leg swelling or palpitations. +HTN  GI:  Negative for abdominal discomfort, blood in stools or black stools or change in bowel habits.  MUSCULOSKELETAL:  See HPI.  SKIN:  Negative for lesions, rash, and itching.  PSYCH:  No mood disorder or recent psychosocial stressors.   +anxiety  +depression   HEMATOLOGY/LYMPHOLOGY:  Negative for prolonged bleeding, bruising easily or swollen nodes.  Patient is not currently taking any anti-coagulants  NEURO:   No history of headaches, syncope, paralysis, seizures or tremors.  All other reviewed and negative other than HPI.      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, estrogen receptor positive 2022    Cataract     Depression     Dry eyes     Dry mouth     Duane's syndrome of right eye     Early dry stage nonexudative age-related macular degeneration of both eyes 2022    Fibromyalgia 2014    Fibromyalgia     Fractured hip     RIGHT HIP    GERD (gastroesophageal reflux disease)     History of psychiatric hospitalization     Hyperlipidemia 1992    Hypertension     Hypocalcemia     Hyponatremia     Kidney stone     Migraine headache     Osteoporosis     Pressure ulcer of unspecified site, unspecified stage     Psoriatic arthritis 1992    Recurrent upper respiratory infection (URI)     Right knee pain     post knee replacement surgery (possible rejectiion of metal)    RLS (restless legs syndrome)     Schizophrenia 1992    stable on meds    Sciatica     Squamous cell carcinoma of skin     Urinary tract infection         Surgical History  Past Surgical History:   Procedure Laterality Date    brow ptosis repair Right 2019    Surgeon: Dr. Karla Henson    CATARACT EXTRACTION      CAUDAL EPIDURAL STEROID INJECTION N/A 2024    Procedure: Caudal SOPHY;  Surgeon: Maile Storm DO;  Location: [facility];  Service: Pain Management;  Laterality: N/A;  ASA ok     SECTION      COLONOSCOPY N/A 2016    Procedure: COLONOSCOPY;  Surgeon: ANDRIA Connelly MD;  Location: [facility] ([address] [place] [address]);  Service: Endoscopy;  Laterality: N/A;     COLONOSCOPY N/A 12/09/2022    Procedure: COLONOSCOPY;  Surgeon: Mikey Walters MD;  Location: [facility];  Service: Endoscopy;  Laterality: N/A;    cyst removed from right sinus  07/09/1982    EPIDURAL STEROID INJECTION INTO CERVICAL SPINE N/A 12/08/2023    Procedure: C6-7 SOPHY;  Surgeon: Spring Jensen MD;  Location: [facility];  Service: Pain Management;  Laterality: N/A;  20 mins    EPIDURAL STEROID INJECTION INTO LUMBAR SPINE N/A 08/02/2023    Procedure: Injection-steroid-epidural-lumbar L5-S1;  Surgeon: Chirag Kim MD;  Location: [facility];  Service: Pain Management;  Laterality: N/A;  asa    EPIDURAL STEROID INJECTION INTO LUMBAR SPINE N/A 5/22/2024    Procedure: L5/S1 Interlaminar SOPHY;  Surgeon: Maile Storm DO;  Location: [facility];  Service: Pain Management;  Laterality: N/A;  20mins-ASA 5d    ESOPHAGOGASTRODUODENOSCOPY N/A 02/07/2023    Procedure: EGD (ESOPHAGOGASTRODUODENOSCOPY);  Surgeon: Dougie Shea MD;  Location: [facility];  Service: Endoscopy;  Laterality: N/A;    FOOT FRACTURE SURGERY      HYSTERECTOMY      VAGINAL HYSTERECTOMY WITHOUT BSO - ENDOMETRIOSIS    INJECTION FOR SENTINEL NODE IDENTIFICATION Right 05/09/2022    Procedure: INJECTION, FOR SENTINEL NODE IDENTIFICATION-Right;  Surgeon: NEAL Dhillon MD;  Location: [facility];  Service: General;  Laterality: Right;    INJECTION OF ANESTHETIC AGENT AROUND MEDIAL BRANCH NERVES INNERVATING LUMBAR FACET JOINT N/A 04/11/2019    Procedure: Lumbo-sacral Block, DR5 and Lateral Branches of S1,S2, S3;  Surgeon: Michelle Pineda Jr., MD;  Location: [facility];  Service: Pain Management;  Laterality: N/A;  Pt takes  and states she holds ASA on her own whenever she has procedures.  Instructed to hold x 3 days prior to procedure.      INJECTION OF ANESTHETIC AGENT AROUND MEDIAL BRANCH NERVES INNERVATING LUMBAR FACET JOINT Bilateral 05/03/2023    Procedure: Block-nerve-medial branch-lumbar bilateral L3,  L4, L5;  Surgeon: Maile Storm DO;  Location: [facility];  Service: Pain Management;  Laterality: Bilateral;  asa    INJECTION OF ANESTHETIC AGENT INTO SACROILIAC JOINT Right 08/30/2018    Procedure: BLOCK, SACROILIAC JOINT-Right- ORAL SEDATION;  Surgeon: Michelle Pineda Jr., MD;  Location: [facility];  Service: Pain Management;  Laterality: Right;    INJECTION OF ANESTHETIC AGENT INTO SACROILIAC JOINT Bilateral 09/27/2018    Procedure: BLOCK, SACROILIAC JOINT-BILATERAL;  Surgeon: Michelle Pineda Jr., MD;  Location: [facility];  Service: Pain Management;  Laterality: Bilateral;  Please keep at 10:00 due to trasnsportation    INJECTION OF ANESTHETIC AGENT INTO SACROILIAC JOINT Bilateral 02/07/2019    Procedure: Bilateral Sacroiliac Joint Injection - Per Dr Pineda, not necessary to hold ASA.;  Surgeon: Michelle Pineda Jr., MD;  Location: [facility] PAIN MG;  Service: Pain Management;  Laterality: Bilateral;    INJECTION, SPINE, LUMBOSACRAL, TRANSFORAMINAL APPROACH Right 7/17/2024    Procedure: Right  L3/4  and L4/5 TFESI;  Surgeon: Maile Storm DO;  Location: [facility]NA[facility]EMENT;  Service: Pain Management;  Laterality: Right;  20 mins  ASA 5 days    INTRAOCULAR PROSTHESES INSERTION Right 08/01/2019    Procedure: INSERTION, IOL PROSTHESIS;  Surgeon: Karen Song MD;  Location: [address];  Service: Ophthalmology;  Laterality: Right;    INTRAOCULAR PROSTHESES INSERTION Left 09/26/2019    Procedure: INSERTION, IOL PROSTHESIS;  Surgeon: Karen Song MD;  Location: [address];  Service: Ophthalmology;  Laterality: Left;    JOINT REPLACEMENT Right     knee    KNEE SURGERY      MASTECTOMY Right 05/09/2022    Procedure: MASTECTOMY-Right;  Surgeon: NEAL Dhillon MD;  Location: [facility];  Service: General;  Laterality: Right;  2.5 HOURS    PHACOEMULSIFICATION OF CATARACT Right 08/01/2019    Procedure: PHACOEMULSIFICATION, CATARACT;  Surgeon: Karen Song MD;  Location: [facility]  OR 1ST FLR;  Service: Ophthalmology;  Laterality: Right;    PHACOEMULSIFICATION OF CATARACT Left 09/26/2019    Procedure: PHACOEMULSIFICATION, CATARACT;  Surgeon: Karen Song MD;  Location: [facility] OR 1ST FLR;  Service: Ophthalmology;  Laterality: Left;    RADIOFREQUENCY ABLATION, NERVE, PERIPHERAL Right 04/24/2024    Procedure: RIGHT L5 Dorsal Ramus and RIGHT Lateral Branches of S1, S2, and S3;  Surgeon: Maile Storm DO;  Location: [facility] PAIN MANAGEMENT;  Service: Pain Management;  Laterality: Right;  30 mins    RADIOFREQUENCY THERMOCOAGULATION Right 05/07/2019    Procedure: RADIOFREQUENCY THERMAL COAGULATION RIGHT DORSAL RAMUS 5 AND LATERAL BRANCH OF S1, S2 AND S3;  Surgeon: Michelle Pineda Jr., MD;  Location: [facility];  Service: Pain Management;  Laterality: Right;    RADIOFREQUENCY THERMOCOAGULATION Left 05/14/2019    Procedure: RADIOFREQUENCY THERMAL COAGULATION LEFT DORSAL RAMUS 5 AND LATERAL BRANCH OF S1,S2 AND S3;  Surgeon: Michelle Pineda Jr., MD;  Location: [facility] PAIN T;  Service: Pain Management;  Laterality: Left;    RADIOFREQUENCY THERMOCOAGULATION Right 10/22/2019    Procedure: RADIOFREQUENCY THERMAL COAGULATION---DARSAL RAMUS 5 and LATERAL S1,S2,and S3 Right;  Surgeon: Michelle Pineda Jr., MD;  Location: [facility];  Service: Pain Management;  Laterality: Right;  patient to sign consent DOS    RADIOFREQUENCY THERMOCOAGULATION Left 10/29/2019    Procedure: RADIOFREQUENCY THERMAL COAGULATION - LEFT - DR5, S1,S2, AND S3;  Surgeon: Michelle Pineda Jr., MD;  Location: [facility] PAIN T;  Service: Pain Management;  Laterality: Left;    RADIOFREQUENCY THERMOCOAGULATION Left 05/26/2020    Procedure: RADIOFREQUENCY THERMAL COAGULATION--Left DR5+ lateral branches of S1, S2, S3;  Surgeon: Michelle Pineda Jr., MD;  Location: [facility];  Service: Pain Management;  Laterality: Left;    RADIOFREQUENCY THERMOCOAGULATION Right 06/02/2020    Procedure: RADIOFREQUENCY THERMAL  "COAGULATION--Right DR5+ lateral branches of S1, S2, S3;  Surgeon: Michelle Pineda Jr., MD;  [facility];  Service: Pain Management;  Laterality: Right;    SENTINEL LYMPH NODE BIOPSY Right 05/09/2022    Procedure: BIOPSY, LYMPH NODE, SENTINEL-Right;  Surgeon: NEAL Dhillon MD;  Location: [facility];  Service: General;  Laterality: Right;    SINUS SURGERY      Surgery on right knee  07/09/1982    TONSILLECTOMY      tumor removed from back left side upper shoulder  03/17/2006        Physical Exam  Vitals:    10/11/24 1421   BP: 114/60   Pulse: 76   Weight: 45.8 kg (101 lb 1.3 oz)   Height: 5' 2" (1.575 m)   PainSc:   3     GEN: No acute distress. Calm, comfortable  HENT: Normocephalic, atraumatic, moist mucous membranes  EYE: Anicteric sclera, non-injected.   CV: Non-diaphoretic.   RESP: Breathing comfortably. Chest expansion symmetric.  PSYCH: Pleasant mood and appropriate affect. Recent and remote memory intact.       Ortho/SPM Exam  PHYSICAL EXAMINATION Prior:    GENERAL: Well appearing, in no acute distress, alert and oriented x3.  PSYCH:  Mood and affect appropriate.  SKIN: Skin color, texture, turgor normal, no rashes or lesions.  HEAD/FACE:  Normocephalic, atraumatic. Cranial nerves grossly intact.  NECK: Normal ROM. Supple.   CV: RRR with palpation of the radial artery.  PULM: No evidence of respiratory difficulty, symmetric chest rise.  GI:  Soft and non-distended.  MSK: Straight leg raising is negative to radicular pain. There is pain to palpation over the facet joints of the lumbar spine. There is pain with lumbar facet loading.  Normal range of motion without pain reproduction with lumbar flexion. Pain with extension.  Peripheral joint ROM is full and pain free without obvious instability or laxity in all four extremities. No deformities, edema, or skin discoloration.  No atrophy or tone abnormalities are noted.   NEURO: Bilateral upper and lower extremity coordination and strength is " symmetric.  No loss of sensation is noted.  MENTAL STATUS: A x O x 3, good concentration, speech is fluent and goal directed  MOTOR: 5/5 in all muscle groups  GAIT:  Antalgic..  Ambulates unassisted.    Imaging  MRI LUMBAR SPINE WITHOUT CONTRAST     CLINICAL HISTORY:  Low back pain, symptoms persist with > 6wks conservative treatment; Dorsalgia, unspecified     TECHNIQUE:  Multiplanar, multisequence MR images were acquired from the thoracolumbar junction to the sacrum without the administration of contrast.     COMPARISON:  CT lumbar spine dated 05/31/2024 and MRI lumbar spine dated 03/11/2023     FINDINGS:  Lumbar spine vertebral body heights appear maintained with thoracolumbar levocurvature.  Variable multilevel disc space narrowing and Modic type 1 endplate changes.  The spinal cord terminus lies near L2-L3.  Probable thin fatty filum terminale, similar. Remaining visualized prevertebral and paraspinal soft tissues demonstrate no acute abnormality.     T12-L1: Circumferential bulge without significant spinal canal stenosis or neural foraminal impingement.     L1-L2: Circumferential bulge with facet arthropathy.  Mild flavum buckling and partial flattening of the ventral thecal sac.  Right neural foraminal encroachment.     L2-L3: Circumferential bulge and facet arthropathy.  No significant spinal canal stenosis.  Mild neural foraminal narrowing.     L3-L4: Circumferential bulge with large superimposed left paracentral extrusion with approximate 0.8 cm inferior migration.  Effacement of the left paracentral thecal sac and left lateral recess and severe left neural foraminal narrowing.     L4-L5: Circumferential bulge with superimposed left paracentral extrusion with superior migration of approximately 1.0 cm.  Flavum thickening and mild facet DJD contributing to mild spinal canal stenosis.  Mild right and moderate left neural foraminal narrowing.     L5-S1: Circumferential bulge and facet DJD with flavum  thickening.  Superimposed right subarticular extrusion with inferior migration demonstrating slight variable T2 signal to the parent disc.  Findings contribute to lateral recess narrowing bilaterally with mild-moderate spinal canal stenosis.  Additional abutment and probable encroachment of the right transiting S1 nerve root.  Moderate-severe left and severe right neural foraminal narrowing.     Impression:     Thoracolumbar scoliosis with multilevel lumbar spondylosis.  Detailed findings on a level by level basis as above.        Electronically signed by:Henok Damon  Date:                                            07/16/2024    Labs:  BMP  Lab Results   Component Value Date     08/14/2024    K 4.0 08/14/2024     08/14/2024    CO2 25 08/14/2024    BUN 8 08/14/2024    CREATININE 0.8 08/14/2024    CALCIUM 9.3 08/14/2024    ANIONGAP 7 (L) 08/14/2024    EGFRNORACEVR >60 08/14/2024     Lab Results   Component Value Date    ALT 11 08/14/2024    AST 19 08/14/2024    GGT 91 (H) 07/28/2014    ALKPHOS 56 08/14/2024    BILITOT 0.4 08/14/2024       Assessment:  Problem List Items Addressed This Visit    None          03/14/2023 -Bhavna KYM Landry is a 69 y.o. 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, estrogen receptor positive (04/13/2022), Cataract, Depression, Dry eyes, Dry mouth, Duane's syndrome of right eye, Early dry stage nonexudative age-related macular degeneration of both eyes (09/20/2022), Fibromyalgia (04/17/2014), Fibromyalgia, Fractured hip, GERD (gastroesophageal reflux disease), History of psychiatric hospitalization, Hyperlipidemia (02/02/1992), Hypertension, Hypocalcemia, Hyponatremia, Kidney stone, Migraine headache, Osteoporosis, Pressure ulcer of unspecified site, unspecified stage, Psoriatic arthritis (02/02/1992), Recurrent upper respiratory infection  (URI), Right knee pain, RLS (restless legs syndrome), Schizophrenia (02/02/1992), Sciatica, Squamous cell carcinoma of skin, and Urinary tract infection.  By history and examination this patient has chronic low back pain without radiculopathy.  The underlying cause cause is facet arthritis and deconditioning.  Pathology is confirmed by imaging.  We discussed the underlying diagnoses and multiple treatment options including non-opioid medications, interventional procedures, physical therapy, and home exercise.  The risks and benefits of each treatment option were discussed and all questions were answered.      5/22/2023- 66 y/o female with a Hx of chronic low back  and bilateral leg pain she is s/p a diagnosis lumbar MBB targeting L4/5 and L5/S1 reporting 0% relief of her symptoms. Her Lumbar MRI multilevel disc bulges starting   at L3 through S1.  She has severe right, moderate left neural foraminal narrowing at L5-S1 thta may be causing her Low back and leg pain.  Her pain was refractory to diagnositic low lumbar MBB so i will attempt a L4-5 Lumbar SOPHY.     04/02/2024 Bhavna Ladnry presents for follow up of her low back/SI joint pain.  Patient has previously undergone bilateral L5 Dorsal Ramus and Lateral Branches of S1, S2, and S3 RFA with significant and lasting relief.  Prior RFA lasted about 2 years.  She would like to repeat the procedure as the pain has now returned.    05/02/2024 - Patient presents virtually today for follow up of her low back pain.  Today her biggest complaint is radicular symptoms primarily on the right.  Patient has a history of lumbar radiculopathy and previously underwent an L5/S1 epidural steroid injection in 08/2023 with excellent relief.  Her pain has returned and she would like to repeat the epidural steroid injection.  She would like to hold off on the left-sided sacral RFA for now until we can address her radicular symptoms as they are more severe.  Discussed with the patient  that she may benefit from a surgical consult if epidural does not provide persistent relief.    6/21/2024- 67 y/o female presents for continued pain in the right low back right buttocks and right leg that radiates into her right foot. Her recent L5-S1 IESI provided 30% relief of her symptoms.  Her MRI significant for disc bulges throughout the lower lumbar spine L3 through S1 she has severe right moderate left neural foraminal narrowing at L5-S1 that may be causing right low back and right leg pain.  Today we discussed focusing her pain interventions on the right side in effort to reduce some of the right-sided symptoms as she does not complain about pain in the left lumbar or left leg.    08/30/2024 - patient presents for follow up of her low back pain with right-sided radicular symptoms.  Most recent right transforaminal epidural steroid injection at L3/L4 and L4/L5 did provide her with some relief..  She continues to have pain that radiates into the buttocks and down the leg as well as pain over the sacrum.  We discussed perform a caudal epidural steroid injection.  Patient was amenable to this plan.    [birth year]43-ehvn-old female with a history of chronic low back with right radiculopathy.  Recently provided a caudal SOPHY on 09/18/2024 that provided 100% relief of her symptoms with improved functionality.  Patient has completed physical therapy in the past recommended that she establish a home exercise plan utilizing the exercises she learned in physical therapy focusing on stretching and muscular strengthening to help with lumbar stabilization.    Plan & Recommendations  Procedures:  None at this time.  Can repeat caudal SOPHY if pain returns.  Medications:  No changes recommended in her medications today.  Imaging:  Reviewed  PT/OT/HEP: I encouraged the patient to maintain a home exercise regimen that includes daily, moderate cardiovascular exercise lasting at least 30 minutes.  This may include yoga, gela chi,  walking, swimming, aqua aerobics, or other exercises that maintain a heart rate of 50-70% of the calculated maximum heart rate.  I also encouraged light, daily stretching focused on the target area.  Follow Up:  3-4 months or sooner if needed.    Daniel Brar NP-C  Interventional Pain Management      Disclaimer: This note was partly generated using dictation software which may occasionally result in transcription errors.

## 2024-10-18 ENCOUNTER — PATIENT MESSAGE (OUTPATIENT)
Dept: PODIATRY | Facility: CLINIC | Age: 69
End: 2024-10-18
Payer: MEDICARE

## 2024-10-22 ENCOUNTER — TELEPHONE (OUTPATIENT)
Dept: INTERNAL MEDICINE | Facility: CLINIC | Age: 69
End: 2024-10-22
Payer: MEDICARE

## 2024-10-22 NOTE — TELEPHONE ENCOUNTER
----- Message from Chika sent at 10/22/2024 10:40 AM CDT -----  Contact: Self [phone]  Would like to receive medical advice    Symptoms (please be specific):  headaches, cough, sore throat     How long has the patient had these symptoms:  5 days     Pharmacy name/number (copy/paste from chart):      [id number] - [address] [address] [place] [id number]  [facility] [address] [address]  Phone: [phone] Fax: [phone]    Would they like a call back or a response via [facility]:  call back    Additional information:  Calling  to speak with  the nurse regarding a cough, headaches, sore throat and lump under neck. Pt declined and appt and requesting a medication sent to pharm.

## 2024-10-23 ENCOUNTER — NURSE TRIAGE (OUTPATIENT)
Dept: ADMINISTRATIVE | Facility: CLINIC | Age: 69
End: 2024-10-23
Payer: MEDICARE

## 2024-10-23 ENCOUNTER — OFFICE VISIT (OUTPATIENT)
Dept: INTERNAL MEDICINE | Facility: CLINIC | Age: 69
End: 2024-10-23
Payer: MEDICARE

## 2024-10-23 DIAGNOSIS — B96.89 ACUTE BACTERIAL SINUSITIS: Primary | ICD-10-CM

## 2024-10-23 DIAGNOSIS — J02.9 SORE THROAT: ICD-10-CM

## 2024-10-23 DIAGNOSIS — R05.1 ACUTE COUGH: ICD-10-CM

## 2024-10-23 DIAGNOSIS — J01.90 ACUTE BACTERIAL SINUSITIS: Primary | ICD-10-CM

## 2024-10-23 PROCEDURE — 1159F MED LIST DOCD IN RCRD: CPT | Mod: CPTII,95,, | Performed by: STUDENT IN AN ORGANIZED HEALTH CARE EDUCATION/TRAINING PROGRAM

## 2024-10-23 PROCEDURE — 4010F ACE/ARB THERAPY RXD/TAKEN: CPT | Mod: CPTII,95,, | Performed by: STUDENT IN AN ORGANIZED HEALTH CARE EDUCATION/TRAINING PROGRAM

## 2024-10-23 PROCEDURE — 1160F RVW MEDS BY RX/DR IN RCRD: CPT | Mod: CPTII,95,, | Performed by: STUDENT IN AN ORGANIZED HEALTH CARE EDUCATION/TRAINING PROGRAM

## 2024-10-23 PROCEDURE — 99213 OFFICE O/P EST LOW 20 MIN: CPT | Mod: 95,,, | Performed by: STUDENT IN AN ORGANIZED HEALTH CARE EDUCATION/TRAINING PROGRAM

## 2024-10-23 RX ORDER — BENZONATATE 200 MG/1
200 CAPSULE ORAL 3 TIMES DAILY PRN
Qty: 30 CAPSULE | Refills: 0 | Status: SHIPPED | OUTPATIENT
Start: 2024-10-23 | End: 2024-11-02

## 2024-10-23 RX ORDER — FLUTICASONE PROPIONATE 50 MCG
1 SPRAY, SUSPENSION (ML) NASAL DAILY
Qty: 18.2 ML | Refills: 0 | Status: SHIPPED | OUTPATIENT
Start: 2024-10-23

## 2024-10-23 RX ORDER — AMOXICILLIN AND CLAVULANATE POTASSIUM 875; 125 MG/1; MG/1
1 TABLET, FILM COATED ORAL EVERY 12 HOURS
Qty: 14 TABLET | Refills: 0 | Status: SHIPPED | OUTPATIENT
Start: 2024-10-23 | End: 2024-10-30

## 2024-10-23 NOTE — PROGRESS NOTES
Subjective:       Patient ID: Bhavna Landry is a 69 y.o. female.    Chief Complaint: Sore Throat      The patient location is: Home  The chief complaint leading to consultation is: sore throat    Visit type: audiovisual    Face to Face time with patient: 5 minutes  15 minutes of total time spent on the encounter, which includes face to face time and non-face to face time preparing to see the patient (eg, review of tests), Obtaining and/or reviewing separately obtained history, Documenting clinical information in the electronic or other health record, Independently interpreting results (not separately reported) and communicating results to the patient/family/caregiver, or Care coordination (not separately reported).         Each patient to whom he or she provides medical services by telemedicine is:  (1) informed of the relationship between the physician and patient and the respective role of any other health care provider with respect to management of the patient; and (2) notified that he or she may decline to receive medical services by telemedicine and may withdraw from such care at any time.    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     This is a new patient to me but established to [facility]. PCP is Sadaf Vargas MD.     Over 1 week history of sore throat and pain in the left ear with fullness and pain over maxillary sinuses. Has low grade temp 100F with associated cough and nasal congestion both with green, yellowish mucous production.     Sore Throat   This is a recurrent problem. The  current episode started in the past 7 days. The problem has been gradually worsening. The pain is worse on the left side. The maximum temperature recorded prior to her arrival was 100 - 100.9 F. The fever has been present for 3 to 4 days. The pain is at a severity of 5/10. The pain is moderate. Associated symptoms include congestion, coughing, ear pain, neck pain and swollen glands. She has tried NSAIDs for the symptoms. The treatment provided mild relief.         Review of Systems   HENT:  Positive for nasal congestion, ear pain and sore throat.    Respiratory:  Positive for cough.    Musculoskeletal:  Positive for neck pain.         Objective:     Gen: Well Appearing, NAD, conversant.   HEENT: PERR, EOMI  Chest: Normal work of breathing  Resp: No Conversational Dyspnea  Neuro: A&Ox3,  speech normal  Mood: Mood normal, behavior normal, thought process linear     Assessment:       Problem List Items Addressed This Visit          Pulmonary    Cough    Relevant Medications    benzonatate (TESSALON) 200 MG capsule     Other Visit Diagnoses       Acute bacterial sinusitis    -  Primary    Relevant Medications    amoxicillin-clavulanate 875-125mg (AUGMENTIN) 875-125 mg per tablet    fluticasone propionate (FLONASE) 50 mcg/actuation nasal spray    Sore throat                Plan:       Bhavna was seen today for sore throat.    Diagnoses and all orders for this visit:    Acute bacterial sinusitis  -     amoxicillin-clavulanate 875-125mg (AUGMENTIN) 875-125 mg per tablet; Take 1 tablet by mouth every 12 (twelve) hours. Take with food. for 7 days  -     fluticasone propionate (FLONASE) 50 mcg/actuation nasal spray; 1 spray (50 mcg total) by Each Nostril route once daily.    Acute cough  -     benzonatate (TESSALON) 200 MG capsule; Take 1 capsule (200 mg total) by mouth 3 (three) times daily as needed for Cough.    Sore throat  Counseled on symptomatic treatment.     Return precautions given. Patient verbalized understanding.                 Gilberto Lomas MD  Family Medicine  [facility]  10/23/2024

## 2024-10-23 NOTE — TELEPHONE ENCOUNTER
Pt co lymph node swollen underneath jaw bone,fever 100F,sinus drainage, sore throat and cough. Per protocol instructed pt to be seen by pcp within 24 hrs.apt scheduled.   Reason for Disposition   [1] Tender node in the neck AND [2] also has a sore throat AND [3] minimal/no runny nose or cough    Additional Information   Negative: SEVERE difficulty breathing (e.g., struggling for each breath, speaks in single words)   Negative: [1] Node is in the neck AND [2] causes difficulty breathing   Negative: [1] Node is in the neck AND [2] can't swallow fluids   Negative: Fever > 103 F (39.4 C)   Negative: [1] Lump or swelling in groin AND [2] pulsating (like heartbeat)   Negative: Patient sounds very sick or weak to the triager   Negative: [1] Single large node AND [2] size > 1 inch (2.5 cm) AND [3] fever   Negative: [1] Overlying skin is red AND [2] fever   Negative: [1] Single large node AND [2] size > 1 inch (2.5 cm) AND [3] no fever   Negative: Rapid increase in size of node over several hours   Negative: [1] Overlying skin is red AND [2] no fever   Negative: [1] Tender node in the groin AND [2] has a sore, scratch, cut or painful red area on that leg   Negative: [1] Tender node in the armpit AND [2] has a sore, scratch, cut or painful red area on that arm    Protocols used: Lymph Nodes - [id number]

## 2024-10-24 ENCOUNTER — TELEPHONE (OUTPATIENT)
Dept: INTERNAL MEDICINE | Facility: CLINIC | Age: 69
End: 2024-10-24
Payer: MEDICARE

## 2024-10-24 NOTE — TELEPHONE ENCOUNTER
----- Message from Madeline sent at 10/23/2024  6:20 PM CDT -----  Regarding: PHARMACY CALL  Contact: [facility]  [facility] pharmacy called regarding medication that was called in for patient.    Please advise. Pharmacy can be reached at [phone]

## 2024-10-29 ENCOUNTER — TELEPHONE (OUTPATIENT)
Dept: PODIATRY | Facility: CLINIC | Age: 69
End: 2024-10-29
Payer: MEDICARE

## 2024-10-30 ENCOUNTER — HOSPITAL ENCOUNTER (OUTPATIENT)
Dept: RADIOLOGY | Facility: HOSPITAL | Age: 69
Discharge: HOME | End: 2024-10-30
Attending: NURSE PRACTITIONER
Payer: MEDICARE

## 2024-10-30 DIAGNOSIS — M79.621 PAIN IN RIGHT AXILLA: ICD-10-CM

## 2024-10-30 DIAGNOSIS — R22.1 MASS OF LEFT SIDE OF NECK: ICD-10-CM

## 2024-10-30 DIAGNOSIS — Z17.0 CARCINOMA OF UPPER-OUTER QUADRANT OF RIGHT BREAST IN FEMALE, ESTROGEN RECEPTOR POSITIVE: ICD-10-CM

## 2024-10-30 DIAGNOSIS — C50.411 CARCINOMA OF UPPER-OUTER QUADRANT OF RIGHT BREAST IN FEMALE, ESTROGEN RECEPTOR POSITIVE: ICD-10-CM

## 2024-10-30 PROCEDURE — 76536 US EXAM OF HEAD AND NECK: CPT | Mod: 26,,, | Performed by: RADIOLOGY

## 2024-10-30 PROCEDURE — 76882 US LMTD JT/FCL EVL NVASC XTR: CPT | Mod: TC,RT

## 2024-10-30 PROCEDURE — 76536 US EXAM OF HEAD AND NECK: CPT | Mod: TC

## 2024-10-30 PROCEDURE — 76882 US LMTD JT/FCL EVL NVASC XTR: CPT | Mod: 26,RT,, | Performed by: RADIOLOGY

## 2024-10-31 ENCOUNTER — TELEPHONE (OUTPATIENT)
Dept: RHEUMATOLOGY | Facility: CLINIC | Age: 69
End: 2024-10-31
Payer: MEDICARE

## 2024-10-31 DIAGNOSIS — L40.50 PSA (PSORIATIC ARTHRITIS): Primary | ICD-10-CM

## 2024-11-01 ENCOUNTER — TELEPHONE (OUTPATIENT)
Dept: INFECTIOUS DISEASES | Facility: HOSPITAL | Age: 69
End: 2024-11-01
Payer: MEDICARE

## 2024-11-06 ENCOUNTER — INFUSION (OUTPATIENT)
Dept: INFUSION THERAPY | Facility: HOSPITAL | Age: 69
End: 2024-11-06
Payer: MEDICARE

## 2024-11-06 VITALS
HEART RATE: 75 BPM | RESPIRATION RATE: 18 BRPM | HEIGHT: 62 IN | OXYGEN SATURATION: 98 % | DIASTOLIC BLOOD PRESSURE: 65 MMHG | SYSTOLIC BLOOD PRESSURE: 138 MMHG | BODY MASS INDEX: 17.81 KG/M2 | WEIGHT: 96.81 LBS

## 2024-11-06 DIAGNOSIS — Z79.811 USE OF ANASTROZOLE: ICD-10-CM

## 2024-11-06 DIAGNOSIS — M85.89 OSTEOPENIA OF MULTIPLE SITES: Primary | ICD-10-CM

## 2024-11-06 PROCEDURE — 63600175 PHARM REV CODE 636 W HCPCS: Performed by: NURSE PRACTITIONER

## 2024-11-06 PROCEDURE — 96374 THER/PROPH/DIAG INJ IV PUSH: CPT

## 2024-11-06 PROCEDURE — 25000003 PHARM REV CODE 250: Performed by: NURSE PRACTITIONER

## 2024-11-06 RX ORDER — HEPARIN 100 UNIT/ML
500 SYRINGE INTRAVENOUS
Status: DISCONTINUED | OUTPATIENT
Start: 2024-11-06 | End: 2024-11-06 | Stop reason: HOSPADM

## 2024-11-06 RX ORDER — SODIUM CHLORIDE 0.9 % (FLUSH) 0.9 %
10 SYRINGE (ML) INJECTION
OUTPATIENT
Start: 2025-04-23

## 2024-11-06 RX ORDER — SODIUM CHLORIDE 0.9 % (FLUSH) 0.9 %
10 SYRINGE (ML) INJECTION
Status: DISCONTINUED | OUTPATIENT
Start: 2024-11-06 | End: 2024-11-06 | Stop reason: HOSPADM

## 2024-11-06 RX ORDER — ZOLEDRONIC ACID 0.04 MG/ML
4 INJECTION, SOLUTION INTRAVENOUS
OUTPATIENT
Start: 2025-04-23

## 2024-11-06 RX ORDER — HEPARIN 100 UNIT/ML
500 SYRINGE INTRAVENOUS
OUTPATIENT
Start: 2025-04-23

## 2024-11-06 RX ADMIN — ZOLEDRONIC ACID 3.3 MG: 4 INJECTION, SOLUTION, CONCENTRATE INTRAVENOUS at 02:11

## 2024-11-06 NOTE — PLAN OF CARE
1500 Pt tolerated INFED well today, no complaints or complications,. VSS. Pt aware to call provider with any questions or concerns and is aware of upcoming appts. Pt ambulatory from clinic with steady gait, no distress noted.

## 2024-11-11 ENCOUNTER — OFFICE VISIT (OUTPATIENT)
Dept: INTERNAL MEDICINE | Facility: CLINIC | Age: 69
End: 2024-11-11
Payer: MEDICARE

## 2024-11-11 DIAGNOSIS — E78.5 HYPERLIPIDEMIA, UNSPECIFIED HYPERLIPIDEMIA TYPE: ICD-10-CM

## 2024-11-11 DIAGNOSIS — I10 ESSENTIAL HYPERTENSION: Primary | Chronic | ICD-10-CM

## 2024-11-11 DIAGNOSIS — J22 LOWER RESPIRATORY INFECTION: ICD-10-CM

## 2024-11-11 PROCEDURE — 4010F ACE/ARB THERAPY RXD/TAKEN: CPT | Mod: CPTII,95,, | Performed by: PHYSICIAN ASSISTANT

## 2024-11-11 PROCEDURE — 99214 OFFICE O/P EST MOD 30 MIN: CPT | Mod: 95,,, | Performed by: PHYSICIAN ASSISTANT

## 2024-11-11 RX ORDER — DOXYCYCLINE 100 MG/1
100 CAPSULE ORAL 2 TIMES DAILY
Qty: 14 CAPSULE | Refills: 0 | Status: SHIPPED | OUTPATIENT
Start: 2024-11-11 | End: 2024-11-18

## 2024-11-11 NOTE — PROGRESS NOTES
INTERNAL MEDICINE URGENT VISIT NOTE    CHIEF COMPLAINT     Chief Complaint   Patient presents with    Cough       HPI     Bhavna Landry is a 69 y.o. female who presents for an urgent visit today.    PCP is Sadaf Vargas MD, patient is new to me.     She has complaint of cough for the past 10-12 days. Symptoms have worsened over the past 7 days.   Subjective fever  Congestion   No SOB or CP  No recent travel.     She is at home during this VV  Face Time is 10 mins.       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, estrogen receptor positive 04/13/2022    Cataract     Depression     Dry eyes     Dry mouth     Duane's syndrome of right eye     Early dry stage nonexudative age-related macular degeneration of both eyes 09/20/2022    Fibromyalgia 04/17/2014    Fibromyalgia     Fractured hip     RIGHT HIP    GERD (gastroesophageal reflux disease)     History of psychiatric hospitalization     Hyperlipidemia 02/02/1992    Hypertension     Hypocalcemia     Hyponatremia     Kidney stone     Migraine headache     Osteoporosis     Pressure ulcer of unspecified site, unspecified stage     Psoriatic arthritis 02/02/1992    Recurrent upper respiratory infection (URI)     Right knee pain     post knee replacement surgery (possible rejectiion of metal)    RLS (restless legs syndrome)     Schizophrenia 02/02/1992    stable on meds    Sciatica     Squamous cell carcinoma of skin     Urinary tract infection        Home Medications:  Prior to Admission medications    Medication Sig Start Date End Date Taking? Authorizing Provider   albuterol (PROVENTIL) 2.5 mg /3 mL (0.083 %) nebulizer solution Take 3 mLs (2.5 mg total) by nebulization every 6 (six) hours as needed for Wheezing. Rescue 1/22/24 1/21/25  Sadaf Vargas MD   albuterol (PROVENTIL/VENTOLIN HFA)  90 mcg/actuation inhaler USE 2 INHALATIONS BY MOUTH 4  TIMES DAILY AS NEEDED 4/24/24   Sadaf Vargas MD   amLODIPine (NORVASC) 5 MG tablet Take 1 tablet (5 mg total) by mouth 2 (two) times a day. 4/25/24   Camilla Nguyễn MD   ascorbic acid, vitamin C, (VITAMIN C) 500 MG tablet Take 500 mg by mouth once daily.    Provider, Historical   aspirin (ECOTRIN) 81 MG EC tablet Take 81 mg by mouth once daily.    Provider, Historical   atorvastatin (LIPITOR) 80 MG tablet Take 1 tablet (80 mg total) by mouth once daily. 10/9/24   Sadaf Vargas MD   baclofen (LIORESAL) 10 MG tablet 1 tab po tid for muscle cramps. 3/18/24   Sadaf Vargas MD   benztropine (COGENTIN) 0.5 MG tablet TAKE 1 TABLET(0.5 MG) BY MOUTH TWICE DAILY 2/26/24   Carol Ann Poe MD   budesonide-formoterol 80-4.5 mcg (SYMBICORT) 80-4.5 mcg/actuation HFAA INHALE 2 PUFFS BY MOUTH TWICE DAILY 8/15/24   Sadaf Vargas MD   exemestane (AROMASIN) 25 mg tablet Take 1 tablet (25 mg total) by mouth once daily. 10/9/24   Cheyanne Smith, NP   fluticasone propionate (FLONASE) 50 mcg/actuation nasal spray 1 spray (50 mcg total) by Each Nostril route once daily. 10/23/24   Gilberto Lomas MD   folic acid (FOLVITE) 1 MG tablet Take 1 tablet (1,000 mcg total) by mouth once daily. 2/23/24   Mode Cornelius MD   HYDROcodone-acetaminophen (NORCO) 5-325 mg per tablet Take 1 tablet by mouth every 12 (twelve) hours as needed for Pain. 8/27/24   Sadaf Vargas MD   immun glob G,IgG,-pro-IgA 0-50 (HIZENTRA) 4 gram/20 mL (20 %) Soln Inject 60 mLs (12 g total) into the skin every 14 (fourteen) days. 9/30/24 9/30/25  Jeff Benítez MD   isosorbide mononitrate (IMDUR) 30 MG 24 hr tablet Take 1 tablet (30 mg total) by mouth once daily. 9/3/24 9/3/25  Philipp Goel MD   LIDOcaine (LIDODERM) 5 % Place 1 patch onto the skin once daily. Remove & Discard patch within 12 hours or as directed by MD 7/17/24   Divya De Leon, PA-C   meloxicam (MOBIC)  7.5 MG tablet Take 1 tablet (7.5 mg total) by mouth once daily. 7/5/24   FRANDY Moss, NP   methotrexate 2.5 MG Tab Take 8 tablets (20 mg total) by mouth every 7 days. TAKE 4 TABLETS MONDAY MORNING AND TAKE 4 TABLETS MONDAY NIGHT ONLY 9/19/24   Mode Cornelius MD   omega-3 fatty acids/fish oil (FISH OIL-OMEGA-3 FATTY ACIDS) 300-1,000 mg capsule Take by mouth once daily.    Provider, Historical   omeprazole (PRILOSEC) 40 MG capsule Take 1 capsule (40 mg total) by mouth every morning. 11/16/23   Sadaf Vargas MD   pregabalin (LYRICA) 25 MG capsule TAKE 1 CAPSULE BY MOUTH EVERY EVENING 9/30/24   Maile Storm DO   pregabalin (LYRICA) 50 MG capsule TAKE 1 CAPSULE EVERY EVENING 9/30/24   Maile Storm DO   risperiDONE (RISPERDAL) 2 MG tablet TAKE 1 TABLET(2 MG) BY MOUTH EVERY MORNING 2/19/24   Carol Ann Poe MD   risperiDONE (RISPERDAL) 4 MG tablet TAKE 1 TABLET(4 MG) BY MOUTH EVERY EVENING 3/7/24   Carol Ann Poe MD   traMADoL (ULTRAM) 50 mg tablet Take 1 tablet (50 mg total) by mouth every 6 (six) hours as needed for Pain. 10/9/24   Sadaf Vargas MD   valsartan (DIOVAN) 80 MG tablet 1 tab po q hs 8/22/24   Sadaf Vargas MD   venlafaxine (EFFEXOR-XR) 75 MG 24 hr capsule Take 1 capsule (75 mg total) by mouth once daily. 12/28/23   Carol Ann Poe MD   vitamin D (VITAMIN D3) 1000 units Tab Take 1,000 Units by mouth once daily.    Provider, Historical   vitamin E 200 UNIT capsule Take 200 Units by mouth once daily.    Provider, Historical   zinc gluconate 50 mg tablet Take 50 mg by mouth once daily.    Provider, Historical       Review of Systems:  Review of Systems   Constitutional:  Negative for chills and fever.   HENT:  Positive for postnasal drip and sore throat. Negative for ear pain and rhinorrhea.    Respiratory:  Positive for cough. Negative for shortness of breath and wheezing.    Cardiovascular:  Negative for chest pain.   Musculoskeletal:  Negative for myalgias.    Skin:  Negative for rash.   Allergic/Immunologic: Negative for environmental allergies.   Neurological:  Positive for headaches.       Health Maintainence:   Immunizations:  Health Maintenance         Date Due Completion Date    Mammogram 06/14/2025 6/14/2024    Colorectal Cancer Screening 12/09/2025 12/9/2022    DEXA Scan 03/18/2027 3/18/2024    TETANUS VACCINE 07/17/2027 7/17/2017    Lipid Panel 07/23/2029 7/23/2024             PHYSICAL EXAM     There were no vitals taken for this visit. VV    Physical Exam  Constitutional:       Comments: Elderly female in NAD or apparent pain. She makes good eye contact, speaks in clear full sentences and is cooperative. She has coughing during  vv  interview and exam.          LABS     Lab Results   Component Value Date    HGBA1C 5.3 12/11/2018     CMP  Sodium   Date Value Ref Range Status   08/14/2024 137 136 - 145 mmol/L Final     Potassium   Date Value Ref Range Status   08/14/2024 4.0 3.5 - 5.1 mmol/L Final     Chloride   Date Value Ref Range Status   08/14/2024 105 95 - 110 mmol/L Final     CO2   Date Value Ref Range Status   08/14/2024 25 23 - 29 mmol/L Final     Glucose   Date Value Ref Range Status   08/14/2024 110 70 - 110 mg/dL Final     BUN   Date Value Ref Range Status   08/14/2024 8 8 - 23 mg/dL Final     Creatinine   Date Value Ref Range Status   08/14/2024 0.8 0.5 - 1.4 mg/dL Final     Calcium   Date Value Ref Range Status   08/14/2024 9.3 8.7 - 10.5 mg/dL Final     Total Protein   Date Value Ref Range Status   08/14/2024 5.8 (L) 6.0 - 8.4 g/dL Final     Albumin   Date Value Ref Range Status   08/14/2024 3.6 3.5 - 5.2 g/dL Final     Total Bilirubin   Date Value Ref Range Status   08/14/2024 0.4 0.1 - 1.0 mg/dL Final     Comment:     For infants and newborns, interpretation of results should be based  on gestational age, weight and in agreement with clinical  observations.    Premature Infant recommended reference ranges:  Up to 24 hours.............<8.0  mg/dL  Up to 48 hours............<12.0 mg/dL  3-5 days..................<15.0 mg/dL  6-29 days.................<15.0 mg/dL       Alkaline Phosphatase   Date Value Ref Range Status   08/14/2024 56 55 - 135 U/L Final     AST   Date Value Ref Range Status   08/14/2024 19 10 - 40 U/L Final     ALT   Date Value Ref Range Status   08/14/2024 11 10 - 44 U/L Final     Anion Gap   Date Value Ref Range Status   08/14/2024 7 (L) 8 - 16 mmol/L Final     eGFR if    Date Value Ref Range Status   04/13/2022 >60.0 >60 mL/min/1.73 m^2 Final     eGFR if non    Date Value Ref Range Status   04/13/2022 >60.0 >60 mL/min/1.73 m^2 Final     Comment:     Calculation used to obtain the estimated glomerular filtration  rate (eGFR) is the CKD-EPI equation.        Lab Results   Component Value Date    WBC 8.58 08/14/2024    HGB 12.6 08/14/2024    HCT 37.2 08/14/2024    MCV 98 08/14/2024     08/14/2024     Lab Results   Component Value Date    CHOL 140 07/23/2024    CHOL 141 05/24/2023    CHOL 153 02/14/2023     Lab Results   Component Value Date    HDL 87 (H) 07/23/2024    HDL 78 (H) 05/24/2023    HDL 90 (H) 02/14/2023     Lab Results   Component Value Date    LDLCALC 43.8 (L) 07/23/2024    LDLCALC 54.0 (L) 05/24/2023    LDLCALC 51.4 (L) 02/14/2023     Lab Results   Component Value Date    TRIG 46 07/23/2024    TRIG 45 05/24/2023    TRIG 58 02/14/2023     Lab Results   Component Value Date    CHOLHDL 62.1 (H) 07/23/2024    CHOLHDL 55.3 (H) 05/24/2023    CHOLHDL 58.8 (H) 02/14/2023     Lab Results   Component Value Date    TSH 2.593 03/22/2023       ASSESSMENT/PLAN     Bhavna Landry is a 69 y.o. female     Diagnoses and all orders for this visit:    Essential hypertension    Lower respiratory infection  -     doxycycline (MONODOX) 100 MG capsule; Take 1 capsule (100 mg total) by mouth 2 (two) times daily. for 7 days    Hyperlipidemia, unspecified hyperlipidemia type             Patient was counseled on  when and how to seek emergent care.       Shadia Coy PA-C   Department of Internal Medicine - [facility]   12:39 PM     Answers submitted by the patient for this visit:  Cough Questionnaire (Submitted on 11/11/2024)  Chief Complaint: Cough  Chronicity: recurrent  Onset: in the past 7 days  Progression since onset: unchanged  Cough characteristics: productive of sputum  ear congestion: Yes  heartburn: No  hemoptysis: No  nasal congestion: Yes  sweats: No  weight loss: No  Aggravated by: nothing  asthma: No  bronchiectasis: No  bronchitis: No  COPD: No  emphysema: No  pneumonia: No  Improvement on treatment: mild

## 2024-11-17 DIAGNOSIS — K21.9 GASTROESOPHAGEAL REFLUX DISEASE, UNSPECIFIED WHETHER ESOPHAGITIS PRESENT: ICD-10-CM

## 2024-11-17 NOTE — TELEPHONE ENCOUNTER
No care due was identified.  [facility] Embedded Care Due Messages. Reference number: [id number].   11/17/2024 5:08:26 PM CST

## 2024-11-18 ENCOUNTER — NURSE TRIAGE (OUTPATIENT)
Dept: ADMINISTRATIVE | Facility: CLINIC | Age: 69
End: 2024-11-18
Payer: MEDICARE

## 2024-11-18 RX ORDER — OMEPRAZOLE 40 MG/1
40 CAPSULE, DELAYED RELEASE ORAL EVERY MORNING
Qty: 90 CAPSULE | Refills: 3 | Status: SHIPPED | OUTPATIENT
Start: 2024-11-18

## 2024-11-18 NOTE — TELEPHONE ENCOUNTER
Spoke with pt who reports that she has been having chest, and nasal congestion for past month. States that she has been seen by two provider, and was prescribed antibiotics with course completion, but still having symptoms. Reports mucous noted to be green in color. Advised to be seen by provider today or tomorrow. Requesting to be seen by Dr. Vargas,No appointments Will send message to office.    Reason for Disposition   Sinus congestion (pressure, fullness) present > 10 days    Additional Information   Negative: Sounds like a life-threatening emergency to the triager   Negative: Difficulty breathing, and not from stuffy nose (e.g., not relieved by cleaning out the nose)   Negative: SEVERE headache and has fever   Negative: Patient sounds very sick or weak to the triager   Negative: SEVERE sinus pain   Negative: Severe headache   Negative: Redness or swelling on the cheek, forehead, or around the eye   Negative: Fever > 103 F (39.4 C)   Negative: Fever > 101 F (38.3 C) and over 60 years of age   Negative: Fever > 100.0 F (37.8 C) and has diabetes mellitus or a weak immune system (e.g., HIV positive, cancer chemotherapy, organ transplant, splenectomy, chronic steroids)   Negative: Fever > 100.0 F (37.8 C) and bedridden (e.g., nursing home patient, stroke, chronic illness, recovering from surgery)   Negative: Fever present > 3 days (72 hours)   Negative: Fever returns after gone for over 24 hours and symptoms worse or not improved   Negative: Sinus pain (not just congestion) and fever   Negative: Earache    Protocols used: Sinus Pain and Congestion-A-[place]

## 2024-11-18 NOTE — TELEPHONE ENCOUNTER
Refill Decision Note   Bhavna Gris  is requesting a refill authorization.  Brief Assessment and Rationale for Refill:  Approve     Medication Therapy Plan:         Comments:     Note composed:1:55 AM 11/18/2024

## 2024-11-19 ENCOUNTER — OFFICE VISIT (OUTPATIENT)
Dept: INTERNAL MEDICINE | Facility: CLINIC | Age: 69
End: 2024-11-19
Payer: MEDICARE

## 2024-11-19 VITALS
OXYGEN SATURATION: 97 % | BODY MASS INDEX: 18.25 KG/M2 | HEART RATE: 88 BPM | WEIGHT: 99.19 LBS | HEIGHT: 62 IN | DIASTOLIC BLOOD PRESSURE: 66 MMHG | SYSTOLIC BLOOD PRESSURE: 136 MMHG

## 2024-11-19 DIAGNOSIS — C50.411 CARCINOMA OF UPPER-OUTER QUADRANT OF RIGHT BREAST IN FEMALE, ESTROGEN RECEPTOR POSITIVE: ICD-10-CM

## 2024-11-19 DIAGNOSIS — Z17.0 CARCINOMA OF UPPER-OUTER QUADRANT OF RIGHT BREAST IN FEMALE, ESTROGEN RECEPTOR POSITIVE: ICD-10-CM

## 2024-11-19 DIAGNOSIS — J98.01 BRONCHOSPASM: ICD-10-CM

## 2024-11-19 DIAGNOSIS — G89.4 CHRONIC PAIN SYNDROME: ICD-10-CM

## 2024-11-19 DIAGNOSIS — D83.9 COMMON VARIABLE IMMUNODEFICIENCY: ICD-10-CM

## 2024-11-19 DIAGNOSIS — D84.821 DRUG-INDUCED IMMUNODEFICIENCY: ICD-10-CM

## 2024-11-19 DIAGNOSIS — Z79.899 DRUG-INDUCED IMMUNODEFICIENCY: ICD-10-CM

## 2024-11-19 DIAGNOSIS — J32.1 CHRONIC FRONTAL SINUSITIS: Primary | ICD-10-CM

## 2024-11-19 PROCEDURE — 4010F ACE/ARB THERAPY RXD/TAKEN: CPT | Mod: CPTII,S$GLB,, | Performed by: INTERNAL MEDICINE

## 2024-11-19 PROCEDURE — 1159F MED LIST DOCD IN RCRD: CPT | Mod: CPTII,S$GLB,, | Performed by: INTERNAL MEDICINE

## 2024-11-19 PROCEDURE — 1101F PT FALLS ASSESS-DOCD LE1/YR: CPT | Mod: CPTII,S$GLB,, | Performed by: INTERNAL MEDICINE

## 2024-11-19 PROCEDURE — G2211 COMPLEX E/M VISIT ADD ON: HCPCS | Mod: S$GLB,,, | Performed by: INTERNAL MEDICINE

## 2024-11-19 PROCEDURE — 3008F BODY MASS INDEX DOCD: CPT | Mod: CPTII,S$GLB,, | Performed by: INTERNAL MEDICINE

## 2024-11-19 PROCEDURE — 3075F SYST BP GE 130 - 139MM HG: CPT | Mod: CPTII,S$GLB,, | Performed by: INTERNAL MEDICINE

## 2024-11-19 PROCEDURE — 99999 PR PBB SHADOW E&M-EST. PATIENT-LVL V: CPT | Mod: PBBFAC,,, | Performed by: INTERNAL MEDICINE

## 2024-11-19 PROCEDURE — 1125F AMNT PAIN NOTED PAIN PRSNT: CPT | Mod: CPTII,S$GLB,, | Performed by: INTERNAL MEDICINE

## 2024-11-19 PROCEDURE — 3078F DIAST BP <80 MM HG: CPT | Mod: CPTII,S$GLB,, | Performed by: INTERNAL MEDICINE

## 2024-11-19 PROCEDURE — 99214 OFFICE O/P EST MOD 30 MIN: CPT | Mod: S$GLB,,, | Performed by: INTERNAL MEDICINE

## 2024-11-19 PROCEDURE — 3288F FALL RISK ASSESSMENT DOCD: CPT | Mod: CPTII,S$GLB,, | Performed by: INTERNAL MEDICINE

## 2024-11-19 RX ORDER — PREDNISONE 20 MG/1
20 TABLET ORAL DAILY
Qty: 10 TABLET | Refills: 0 | Status: SHIPPED | OUTPATIENT
Start: 2024-11-19 | End: 2024-11-24

## 2024-11-19 RX ORDER — TRAMADOL HYDROCHLORIDE 50 MG/1
50 TABLET ORAL EVERY 8 HOURS PRN
Qty: 90 EACH | Refills: 0 | Status: SHIPPED | OUTPATIENT
Start: 2024-11-19

## 2024-11-19 RX ORDER — AMOXICILLIN AND CLAVULANATE POTASSIUM 875; 125 MG/1; MG/1
1 TABLET, FILM COATED ORAL 2 TIMES DAILY
Qty: 28 TABLET | Refills: 0 | Status: SHIPPED | OUTPATIENT
Start: 2024-11-19 | End: 2024-12-03

## 2024-11-19 NOTE — PATIENT INSTRUCTIONS
Stop hydrocodone     Start Tramadol 1 tab 3 times a day.    Augmentin  - twice daily x 2 weeks.    Prednisone 2 tabs daily x 5 days.    Saline irrigation of sinuses.  Google:  how to irrigate sinuses.    Albuterol 2 puffs 4 times a day.    Mucinex twice a day.

## 2024-11-19 NOTE — PROGRESS NOTES
Subjective     Patient ID: Bhavna Landry is a 69 y.o. female.    Chief Complaint: Cough    HPI  C/o cough x 1 mo    Head congested.     Felt better after taking Augmentin, but a week later, symptoms recurred.     Then saw A Zbigniew, in  person.  Yanelis prescribed, which helped a little, but not as much as augmenting.       SHe is blowing out green, bloody mucus from nose.       No fever.  Fatigued.       Cervical adenopathy improved when taking abx.       Ex-smoker.  Wheezing.  Using albuterol.     H/o chronic sinusitis and sinus surgery.    She has Common Variable Immunodef Dis.     She can't afford IVIG Infusion - about $230 a month.  \  Chronic lower back pain.  Taking tramadol in am and pm and hydrocodone mid day with improved control of pain.    Stopped iron  Taking statin.        Review of Systems   Constitutional:  Negative for fever and unexpected weight change.   HENT:  Negative for nasal congestion and postnasal drip.    Eyes:  Negative for pain, discharge and visual disturbance.   Respiratory:  Negative for cough, chest tightness, shortness of breath and wheezing.    Cardiovascular:  Negative for chest pain and leg swelling.   Gastrointestinal:  Negative for abdominal pain, constipation, diarrhea and nausea.   Genitourinary:  Negative for difficulty urinating, dysuria and hematuria.   Integumentary:  Negative for rash.   Neurological:  Negative for headaches.   Psychiatric/Behavioral:  Negative for dysphoric mood and sleep disturbance. The patient is not nervous/anxious.           Objective     Physical Exam  Constitutional:       General: She is not in acute distress.     Appearance: She is well-developed.   HENT:      Head: Normocephalic and atraumatic.      Comments: Frontal tenderness on R.     Right Ear: Tympanic membrane normal.      Left Ear: Tympanic membrane normal.      Mouth/Throat:      Pharynx: No oropharyngeal exudate.   Cardiovascular:      Rate and Rhythm: Normal rate and regular rhythm.    Pulmonary:      Effort: Pulmonary effort is normal. No respiratory distress.      Breath sounds: No stridor. Wheezing (bilat) present. No rhonchi or rales.   Musculoskeletal:      Cervical back: Neck supple.      Right lower leg: No edema.      Left lower leg: No edema.   Lymphadenopathy:      Cervical: No cervical adenopathy.   Neurological:      Mental Status: She is alert and oriented to person, place, and time.   Psychiatric:         Mood and Affect: Mood normal.         Behavior: Behavior normal.         Thought Content: Thought content normal.            Assessment and Plan     1. Chronic frontal sinusitis  -     Ambulatory referral/consult to ENT; Future; Expected date: 11/26/2024    2. Chronic pain syndrome    3. Drug-induced immunodeficiency    4. Common variable immunodeficiency    5. Bronchospasm    6. 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, estrogen receptor positive  -     traMADoL (ULTRAM) 50 mg tablet; Take 1 tablet (50 mg total) by mouth every 8 (eight) hours as needed for Pain.  Dispense: 90 each; Refill: 0    Other orders  -     amoxicillin-clavulanate 875-125mg (AUGMENTIN) 875-125 mg per tablet; Take 1 tablet by mouth 2 (two) times daily. for 14 days  Dispense: 28 tablet; Refill: 0  -     predniSONE (DELTASONE) 20 MG tablet; Take 1 tablet (20 mg total) by mouth once daily. for 5 days  Dispense: 10 tablet; Refill: 0    7.  Recurrent chronic sinusitis      Albuterol qid    Alert Dr Benítez about inability to afford immunoglobulin     Saline irrigation of sinuses.  No follow-ups on file.

## 2024-12-03 DIAGNOSIS — J01.90 ACUTE BACTERIAL SINUSITIS: ICD-10-CM

## 2024-12-03 DIAGNOSIS — B96.89 ACUTE BACTERIAL SINUSITIS: ICD-10-CM

## 2024-12-03 RX ORDER — FLUTICASONE PROPIONATE 50 MCG
SPRAY, SUSPENSION (ML) NASAL
Qty: 32 G | Refills: 2 | Status: SHIPPED | OUTPATIENT
Start: 2024-12-03

## 2024-12-03 NOTE — TELEPHONE ENCOUNTER
No care due was identified.  Health [facility] Embedded Care Due Messages. Reference number: [id number].   12/03/2024 10:18:38 AM CST

## 2024-12-03 NOTE — TELEPHONE ENCOUNTER
Refill Routing Note   Medication(s) are not appropriate for processing by [facility] for the following reason(s):        No active prescription written by provider  Responsible provider unclear    [facility] action(s):  Defer             Appointments  past 12m or future 3m with PCP    Date Provider   Last Visit   11/19/2024 Sadaf Vargas MD   Next Visit   1/16/2025 Sadaf Vargas MD   ED visits in past 90 days: 0        Note composed:1:12 PM 12/03/2024

## 2024-12-06 ENCOUNTER — PATIENT MESSAGE (OUTPATIENT)
Dept: INFECTIOUS DISEASES | Facility: HOSPITAL | Age: 69
End: 2024-12-06
Payer: MEDICARE

## 2024-12-07 ENCOUNTER — OFFICE VISIT (OUTPATIENT)
Dept: URGENT CARE | Facility: CLINIC | Age: 69
End: 2024-12-07
Payer: MEDICARE

## 2024-12-07 VITALS
HEIGHT: 62 IN | OXYGEN SATURATION: 98 % | WEIGHT: 99.19 LBS | TEMPERATURE: 98 F | DIASTOLIC BLOOD PRESSURE: 69 MMHG | RESPIRATION RATE: 19 BRPM | SYSTOLIC BLOOD PRESSURE: 143 MMHG | BODY MASS INDEX: 18.25 KG/M2 | HEART RATE: 85 BPM

## 2024-12-07 DIAGNOSIS — S99.921A RIGHT FOOT INJURY, INITIAL ENCOUNTER: Primary | ICD-10-CM

## 2024-12-07 PROCEDURE — 99213 OFFICE O/P EST LOW 20 MIN: CPT | Mod: S$GLB,,,

## 2024-12-07 NOTE — PROGRESS NOTES
"Subjective:      Patient ID: Bhavna Landry is a 69 y.o. female.    Vitals:  height is 5' 2" (1.575 m) and weight is 45 kg (99 lb 3.3 oz). Her oral temperature is 98.2 °F (36.8 °C). Her blood pressure is 143/69 (abnormal) and her pulse is 85. Her respiration is 19 and oxygen saturation is 98%.     Chief Complaint: Ankle Injury    Pt present with RT ankle injury from hitting to bed frame happened 3 weeks ago. Tx include nothing at home.    Provider note starts below:  Patient presents to clinic for evaluation of right foot injury. States that she hit her foot against her bed frame approximately 3 weeks ago. She has been treating at home with Tramadol but states she is still having pain to the foot. States she is unable to bear weight or walk without pain. Denies pain at rest. Denies any numbness, tingling, swelling, bruising, laceration, abrasion. No other complaints.    Ankle Injury   The incident occurred more than 1 week ago. The incident occurred at home. The pain is present in the right ankle. The pain is at a severity of 8/10. The pain is severe. The pain has been Constant since onset. Pertinent negatives include no loss of motion, loss of sensation, numbness or tingling. She reports no foreign bodies present. The symptoms are aggravated by movement. She has tried NSAIDs for the symptoms. The treatment provided no relief.     Constitution: Negative for chills and fever.   Cardiovascular:  Negative for chest pain.   Eyes:  Negative for vision loss.   Respiratory:  Negative for shortness of breath.    Gastrointestinal:  Negative for nausea and vomiting.   Musculoskeletal:  Positive for pain (R foot) and trauma (R foot). Negative for joint swelling and abnormal ROM of joint.   Skin:  Negative for color change, pale, wound, abrasion, laceration, erythema and bruising.   Neurological:  Negative for loss of balance, disorientation, altered mental status, numbness and tingling.   Psychiatric/Behavioral:  Negative for " altered mental status, disorientation and confusion.       Objective:     Physical Exam   Constitutional: She is oriented to person, place, and time.  Non-toxic appearance. She does not appear ill. No distress.   HENT:   Head: Normocephalic and atraumatic.   Eyes: Conjunctivae are normal. Extraocular movement intact   Neck: Neck supple.   Pulmonary/Chest: Effort normal.   Abdominal: Normal appearance.   Musculoskeletal: Normal range of motion.         General: Normal range of motion.      Comments: Tenderness to palpation to lateral aspect of right foot inferior to the lateral malleolus, anatomically around the calcaneofibular ligament. There is no bony tenderness over lateral malleolus, medial malleolus, fifth metatarsal. No tenderness of achilles. No obvious deformity, swelling, color change. Neurovascularly intact.    Neurological: She is alert, oriented to person, place, and time and at baseline.   Skin: Skin is warm and dry. No erythema   Psychiatric: Her behavior is normal. Mood normal.   Nursing note and vitals reviewed.    Assessment:     1. Right foot injury, initial encounter        Plan:     Right foot injury, initial encounter  -     XR FOOT COMPLETE 3 VIEW RIGHT; Future; Expected date: 12/09/2024  -     Ambulatory referral/consult to Orthopedics  -     Walking Boot For Home Use      Medical Decision Making:   Urgent Care Management:  Pt w/ right foot injury x3 weeks ago, still having pain. RLE is NVI. No bony tenderness over medial/lateral malleolus, fifth metatarsal.     XR unavailable today in clinic, recommended XR to evaluate for possible fracture. Offered XR today at another urgent care clinic with XR available or XR to be done at imaging center on Monday. Pt would like to do XR on Monday. Order placed.     Order for pneumatic walking boot placed for diagnosis of right foot injury, which will allow for proper stabilization and improved functionality of injury.    Pt already taking Tramadol at home  for other medical reason.         Patient Instructions   Right Foot Injury  X-ray of the foot has been ordered. Please call our Referral Hotline at [phone] to schedule an appointment. You will receive a call or MyChart message with results.   Walking boot to help alleviate pain when bearing weight.  A referral has been placed for you to be seen with an orthopedic doctor if pain persists. You should receive a call from [facility] within the next 1-3 days to set up an appointment. If you do not receive a call, you may call our Referral Hotline at [phone] to make an appointment.  You may continue Tramadol as prescribed by previous provider as needed for pain.   RICE - Rest, ice, compression and elevation to the affected joint or limb as needed.  Rest your foot. You can use crutches or walking boot to help keep the weight off your foot if needed.  Place an ice pack or a bag of frozen vegetables wrapped in a towel over the painful part. Never put ice right on the skin. Use ice every 1 to 2 hours for 10 to 15 minutes at a time.   Wrap your ankle or foot with an elastic bandage to help with swelling.  Prop your ankle on pillows, keeping your foot above the level of your heart. This may help lessen pain and swelling.  In a few days, when you have less swelling and pain, you can start to gently stretch your ankle and foot.    Please remember that you have received care at an urgent care today. Urgent cares are not emergency rooms and are not equipped to handle life threatening emergencies and cannot rule in or out certain medical conditions and you may be released before all of your medical problems are known or treated. Please arrange follow up with your primary care physician or speciality clinic (orthopedics) within 2-5 days if your signs and symptoms have not resolved or worsen.     Please return here or go to the Emergency Department for any concerns or worsening of condition.Patient was educated on  signs/symptoms that would warrant emergent medical attention.   The pain or swelling is getting worse.  Your toes are blue or gray and numb.  Your ankle feels more unstable or wobbly.

## 2024-12-07 NOTE — PATIENT INSTRUCTIONS
Right Foot Injury  X-ray of the foot has been ordered. Please call our Referral Hotline at [phone] to schedule an appointment. You will receive a call or MyChart message with results.   Walking boot to help alleviate pain when bearing weight.  A referral has been placed for you to be seen with an orthopedic doctor if pain persists. You should receive a call from [facility] within the next 1-3 days to set up an appointment. If you do not receive a call, you may call our Referral Hotline at [phone] to make an appointment.  You may continue Tramadol as prescribed by previous provider as needed for pain.   RICE - Rest, ice, compression and elevation to the affected joint or limb as needed.  Rest your foot. You can use crutches or walking boot to help keep the weight off your foot if needed.  Place an ice pack or a bag of frozen vegetables wrapped in a towel over the painful part. Never put ice right on the skin. Use ice every 1 to 2 hours for 10 to 15 minutes at a time.   Wrap your ankle or foot with an elastic bandage to help with swelling.  Prop your ankle on pillows, keeping your foot above the level of your heart. This may help lessen pain and swelling.  In a few days, when you have less swelling and pain, you can start to gently stretch your ankle and foot.    Please remember that you have received care at an urgent care today. Urgent cares are not emergency rooms and are not equipped to handle life threatening emergencies and cannot rule in or out certain medical conditions and you may be released before all of your medical problems are known or treated. Please arrange follow up with your primary care physician or speciality clinic (orthopedics) within 2-5 days if your signs and symptoms have not resolved or worsen.     Please return here or go to the Emergency Department for any concerns or worsening of condition.Patient was educated on signs/symptoms that would warrant emergent medical attention.    The pain or swelling is getting worse.  Your toes are blue or gray and numb.  Your ankle feels more unstable or wobbly.

## 2024-12-09 ENCOUNTER — TELEPHONE (OUTPATIENT)
Dept: PODIATRY | Facility: CLINIC | Age: 69
End: 2024-12-09
Payer: MEDICARE

## 2024-12-09 ENCOUNTER — HOSPITAL ENCOUNTER (OUTPATIENT)
Dept: RADIOLOGY | Facility: HOSPITAL | Age: 69
Discharge: HOME | End: 2024-12-09
Payer: MEDICARE

## 2024-12-09 DIAGNOSIS — S99.921A RIGHT FOOT INJURY, INITIAL ENCOUNTER: ICD-10-CM

## 2024-12-09 DIAGNOSIS — J42 CHRONIC BRONCHITIS, UNSPECIFIED CHRONIC BRONCHITIS TYPE: ICD-10-CM

## 2024-12-09 PROCEDURE — 73630 X-RAY EXAM OF FOOT: CPT | Mod: 26,RT,, | Performed by: RADIOLOGY

## 2024-12-09 PROCEDURE — 73630 X-RAY EXAM OF FOOT: CPT | Mod: TC,FY,PO,RT

## 2024-12-09 RX ORDER — ALBUTEROL SULFATE 0.83 MG/ML
2.5 SOLUTION RESPIRATORY (INHALATION) EVERY 6 HOURS PRN
Qty: 90 EACH | Refills: 3 | Status: SHIPPED | OUTPATIENT
Start: 2024-12-09 | End: 2025-12-09

## 2024-12-09 NOTE — TELEPHONE ENCOUNTER
No care due was identified.  Health [facility] Embedded Care Due Messages. Reference number: [id number].   12/09/2024 9:21:38 AM CST   Standing/Toileting

## 2024-12-09 NOTE — TELEPHONE ENCOUNTER
----- Message from Cindy sent at 12/9/2024  9:02 AM CST -----  Contact: Self   Requesting an RX refill or new RX.    Is this a refill or new RX:     RX name and strength (copy/paste from chart):  albuterol (PROVENTIL) 2.5 mg /3 mL (0.083 %) nebulizer solution    Is this a 30 day or 90 day RX:     Pharmacy name and phone # (copy/paste from chart):    [id number] - [place] - [address] AT [facility] [address]  [address] [address]  Phone: [phone] Fax: [phone]              The doctors have asked that we provide their patients with the following 2 reminders -- prescription refills can take up to 72 hours, and a friendly reminder that in the future you can use your [facility] account to request refills: yes

## 2024-12-09 NOTE — TELEPHONE ENCOUNTER
Spoke with pt. And let her know her message was forward to Dr. Zhang. And to please give him some time to respond. Pt. Verbally stated okay and said thank you.

## 2024-12-10 DIAGNOSIS — M54.17 LUMBOSACRAL RADICULOPATHY: Primary | ICD-10-CM

## 2024-12-10 NOTE — TELEPHONE ENCOUNTER
Refill Decision Note   Bhavna Gris  is requesting a refill authorization.  Brief Assessment and Rationale for Refill:  Approve     Medication Therapy Plan:        Comments:     Note composed:8:38 PM 12/09/2024

## 2024-12-11 ENCOUNTER — TELEPHONE (OUTPATIENT)
Dept: PAIN MEDICINE | Facility: CLINIC | Age: 69
End: 2024-12-11
Payer: MEDICARE

## 2024-12-11 ENCOUNTER — OFFICE VISIT (OUTPATIENT)
Dept: ORTHOPEDICS | Facility: CLINIC | Age: 69
End: 2024-12-11
Payer: MEDICARE

## 2024-12-11 DIAGNOSIS — M54.17 LUMBOSACRAL RADICULOPATHY: Primary | ICD-10-CM

## 2024-12-11 DIAGNOSIS — S93.491A SPRAIN OF POSTERIOR TALOFIBULAR LIGAMENT OF RIGHT ANKLE, INITIAL ENCOUNTER: Primary | ICD-10-CM

## 2024-12-11 PROCEDURE — 1101F PT FALLS ASSESS-DOCD LE1/YR: CPT | Mod: CPTII,S$GLB,, | Performed by: NURSE PRACTITIONER

## 2024-12-11 PROCEDURE — 4010F ACE/ARB THERAPY RXD/TAKEN: CPT | Mod: CPTII,S$GLB,, | Performed by: NURSE PRACTITIONER

## 2024-12-11 PROCEDURE — 99999 PR PBB SHADOW E&M-EST. PATIENT-LVL IV: CPT | Mod: PBBFAC,,, | Performed by: NURSE PRACTITIONER

## 2024-12-11 PROCEDURE — 99213 OFFICE O/P EST LOW 20 MIN: CPT | Mod: S$GLB,,, | Performed by: NURSE PRACTITIONER

## 2024-12-11 PROCEDURE — 1160F RVW MEDS BY RX/DR IN RCRD: CPT | Mod: CPTII,S$GLB,, | Performed by: NURSE PRACTITIONER

## 2024-12-11 PROCEDURE — 3288F FALL RISK ASSESSMENT DOCD: CPT | Mod: CPTII,S$GLB,, | Performed by: NURSE PRACTITIONER

## 2024-12-11 PROCEDURE — 1126F AMNT PAIN NOTED NONE PRSNT: CPT | Mod: CPTII,S$GLB,, | Performed by: NURSE PRACTITIONER

## 2024-12-11 PROCEDURE — 1159F MED LIST DOCD IN RCRD: CPT | Mod: CPTII,S$GLB,, | Performed by: NURSE PRACTITIONER

## 2024-12-11 NOTE — TELEPHONE ENCOUNTER
----- Message from Nani Storm sent at 12/10/2024  2:59 PM CST -----  Regarding: Order for PHUONG HAZEL    Patient Name: PHUONG HAZEL([id number])  Sex: Female  : 1955      PCP: MILLIE TELLO    Center: [facility]ENTR[facility] BILL[facility]     Types of orders made on 12/10/2024: Procedure Request    Order Date:12/10/2024  Ordering User:NANI STORM [[id number]]  Encounter Provider:Nani Storm DO [[id number]]  Authorizing Provider: Nani Storm DO [[id number]]  Department:[facility] PAIN MANAGEMENT[[id number]]    Common Order Information  Procedure -> Epidural Injection (specify level) Cmt: Caudal    Pre-op Diagnosis -> Lumbosacral radiculopathy     Order Specific Information  Order: Procedure Order to Pain Management [Custom: [id number]]  Order #:          [id number]: 1 FUTURE    Priority: Routine  Class: Clinic Performed    Future Order Information      Expires on:12/10/2025            Expected by:12/10/2024                   Associated Diagnoses      M54.17 Lumbosacral radiculopathy      Physician -> Gelter         Is patient on anti-coagulants? -> Yes         Facility Name: -> [facility]           Priority: Routine  Class: Clinic Performed    Future Order Information      Expires on:12/10/2025            Expected by:12/10/2024                   Associated Diagnoses      M54.17 Lumbosacral radiculopathy      Procedure -> Epidural Injection (specify level) Cmt: Caudal        Physician -> Gelter         Is patient on anti-coagulants? -> Yes         Pre-op Diagnosis -> Lumbosacral radiculopathy         Facility Name: -> [facility]

## 2024-12-11 NOTE — PROGRESS NOTES
SUBJECTIVE:   History of Present Illness    CHIEF COMPLAINT:  - Bhavna presents for initial evaluation of right foot and ankle pain after hitting it on a bed frame four weeks ago.    HPI:  Bhavna presents with right foot pain that began four weeks ago when she hit her foot on the front of her bed. She describes the pain as being located in a lateral area of her right foot and reports that it radiates to her heel. Bhavna initially waited, thinking the pain would improve on its own, but it did not. She denies any previous injury to this foot or ankle that she can remember, though she mentions having injured her left foot in the past.    The pain is described as being sensitive in the lateral ankle area. Over the past four weeks, the patient reports doing minimal interventions for the pain. She has been taking medication prescribed by Dr. Vargas for sciatica, including hydrocodone or Tramadol, but states that these medications do not help with the current foot pain.    Bhavna sought care at Urgent Care on 12/7/24, where an x-ray was ordered. She was told that nothing abnormal was seen on the x-ray. Bhavna experiences pain when walking normally, but finds it somewhat better when walking on her heels. She confirms that her foot twisted externally when the injury occurred.    MEDICATIONS:  - Hydrocodone: prescribed by Dr. Vargas for sciatica  - Tramadol: prescribed by Dr. Vargas for sciatica, not helping with current foot pain      ROS:  Constitutional: -fever, -chills  Eyes: -visual changes  ENT: -nasal congestion, -sore throat  Respiratory: -cough, -shortness of breath  Cardiovascular: -chest pain, -palpitations  Gastrointestinal: -nausea, -vomiting  Genitourinary: -hematuria, -dysuria  Integument/Breast: -rash, -pruritus, -breast skin changes  Hematologic/Lymphatic: -easy bruising, -lymphadenopathy  Musculoskeletal: +arthralgia, -myalgia, -back pain  Neurological: -seizures, -tremors  Behavioral/Psych:  -hallucinations  Endocrine: -heat intolerance, -cold intolerance         Review of patient's allergies indicates:   Allergen Reactions    Etanercept Other (See Comments)     Other reaction(s): recurrent infections    Chloramphenicol sod succinate Hives    Codeine Other (See Comments)     Other reaction(s): Stomach upset. Pt states OK with Percocet    Nickel sutures [surgical stainless steel] Dermatitis     Allergic contact dermatitis    Adhesive Rash         Current Outpatient Medications   Medication Sig Dispense Refill    albuterol (PROVENTIL) 2.5 mg /3 mL (0.083 %) nebulizer solution Take 3 mLs (2.5 mg total) by nebulization every 6 (six) hours as needed for Wheezing. Rescue 90 each 3    albuterol (PROVENTIL/VENTOLIN HFA) 90 mcg/actuation inhaler USE 2 INHALATIONS BY MOUTH 4  TIMES DAILY AS NEEDED 34 g 3    amLODIPine (NORVASC) 5 MG tablet Take 1 tablet (5 mg total) by mouth 2 (two) times a day. 180 tablet 1    ascorbic acid, vitamin C, (VITAMIN C) 500 MG tablet Take 500 mg by mouth once daily.      aspirin (ECOTRIN) 81 MG EC tablet Take 81 mg by mouth once daily.      atorvastatin (LIPITOR) 80 MG tablet Take 1 tablet (80 mg total) by mouth once daily. 90 tablet 3    baclofen (LIORESAL) 10 MG tablet 1 tab po tid for muscle cramps. 60 tablet 1    benztropine (COGENTIN) 0.5 MG tablet TAKE 1 TABLET(0.5 MG) BY MOUTH TWICE DAILY 180 tablet 3    budesonide-formoterol 80-4.5 mcg (SYMBICORT) 80-4.5 mcg/actuation HFAA INHALE 2 PUFFS BY MOUTH TWICE DAILY 30.6 g 2    exemestane (AROMASIN) 25 mg tablet Take 1 tablet (25 mg total) by mouth once daily. 30 tablet 11    fluticasone propionate (FLONASE) 50 mcg/actuation nasal spray SHAKE LIQUID AND USE 1 SPRAY(50 MCG) IN EACH NOSTRIL DAILY 32 g 2    folic acid (FOLVITE) 1 MG tablet Take 1 tablet (1,000 mcg total) by mouth once daily. 90 tablet 3    HYDROcodone-acetaminophen (NORCO) 5-325 mg per tablet Take 1 tablet by mouth every 12 (twelve) hours as needed for Pain. 60 tablet 0     immun glob G,IgG,-pro-IgA 0-50 (HIZENTRA) 4 gram/20 mL (20 %) Soln Inject 60 mLs (12 g total) into the skin every 14 (fourteen) days. 120 mL 11    isosorbide mononitrate (IMDUR) 30 MG 24 hr tablet Take 1 tablet (30 mg total) by mouth once daily. 90 tablet 3    LIDOcaine (LIDODERM) 5 % Place 1 patch onto the skin once daily. Remove & Discard patch within 12 hours or as directed by MD 30 patch 0    meloxicam (MOBIC) 7.5 MG tablet Take 1 tablet (7.5 mg total) by mouth once daily. 14 tablet 0    methotrexate 2.5 MG Tab Take 8 tablets (20 mg total) by mouth every 7 days. TAKE 4 TABLETS MONDAY MORNING AND TAKE 4 TABLETS MONDAY NIGHT ONLY 96 tablet 0    omega-3 fatty acids/fish oil (FISH OIL-OMEGA-3 FATTY ACIDS) 300-1,000 mg capsule Take by mouth once daily.      omeprazole (PRILOSEC) 40 MG capsule TAKE 1 CAPSULE(40 MG) BY MOUTH EVERY MORNING 90 capsule 3    pregabalin (LYRICA) 25 MG capsule TAKE 1 CAPSULE BY MOUTH EVERY EVENING 30 capsule 2    pregabalin (LYRICA) 50 MG capsule TAKE 1 CAPSULE EVERY EVENING 30 capsule 2    risperiDONE (RISPERDAL) 2 MG tablet TAKE 1 TABLET(2 MG) BY MOUTH EVERY MORNING 90 tablet 3    risperiDONE (RISPERDAL) 4 MG tablet TAKE 1 TABLET(4 MG) BY MOUTH EVERY EVENING 90 tablet 3    traMADoL (ULTRAM) 50 mg tablet Take 1 tablet (50 mg total) by mouth every 8 (eight) hours as needed for Pain. 90 each 0    valsartan (DIOVAN) 80 MG tablet Take 1 tablet (80 mg total) by mouth 2 (two) times daily. 180 tablet 3    venlafaxine (EFFEXOR-XR) 75 MG 24 hr capsule Take 1 capsule (75 mg total) by mouth once daily. 90 capsule 3    vitamin D (VITAMIN D3) 1000 units Tab Take 1,000 Units by mouth once daily.      vitamin E 200 UNIT capsule Take 200 Units by mouth once daily.      zinc gluconate 50 mg tablet Take 50 mg by mouth once daily.       No current facility-administered medications for this visit.     Facility-Administered Medications Ordered in Other Visits   Medication Dose Route Frequency Provider Last  Rate Last Admin    tropicamide 1% ophthalmic solution 1 drop  1 drop Right Eye On Call Procedure Kaern Song MD   1 drop at 19 0648       Past Medical History:   Diagnosis Date    Allergy     Amblyopia     Anemia     Anticoagulant long-term use     Arthritis 1992    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, estrogen receptor positive 2022    Cataract     Depression     Dry eyes     Dry mouth     Duane's syndrome of right eye     Early dry stage nonexudative age-related macular degeneration of both eyes 2022    Fibromyalgia 2014    Fibromyalgia     Fractured hip     RIGHT HIP    GERD (gastroesophageal reflux disease)     History of psychiatric hospitalization     Hyperlipidemia 1992    Hypertension     Hypocalcemia     Hyponatremia     Kidney stone     Migraine headache     Osteoporosis     Pressure ulcer of unspecified site, unspecified stage     Psoriatic arthritis 1992    Recurrent upper respiratory infection (URI)     Right knee pain     post knee replacement surgery (possible rejectiion of metal)    RLS (restless legs syndrome)     Schizophrenia 1992    stable on meds    Sciatica     Squamous cell carcinoma of skin     Urinary tract infection        Past Surgical History:   Procedure Laterality Date    brow ptosis repair Right 2019    Surgeon: Dr. Karla Henson    CATARACT EXTRACTION      CAUDAL EPIDURAL STEROID INJECTION N/A 2024    Procedure: Caudal SOPHY;  Surgeon: Maile Storm DO;  Location: [facility];  Service: Pain Management;  Laterality: N/A;  ASA ok     SECTION      COLONOSCOPY N/A 2016    Procedure: COLONOSCOPY;  Surgeon: ANDRIA Connelly MD;  Location: [facility] ENDO ([address] [place] [address]);  Service: Endoscopy;  Laterality: N/A;    COLONOSCOPY N/A 2022    Procedure: COLONOSCOPY;  Surgeon: Mikey Walters MD;  Location: [facility] ENDO;  Service: Endoscopy;  Laterality: N/A;    cyst removed from right sinus   07/09/1982    EPIDURAL STEROID INJECTION INTO CERVICAL SPINE N/A 12/08/2023    Procedure: C6-7 SOPHY;  Surgeon: Spring Jensen MD;  Location: [facility] PAIN MANAGEMENT;  Service: Pain Management;  Laterality: N/A;  20 mins    EPIDURAL STEROID INJECTION INTO LUMBAR SPINE N/A 08/02/2023    Procedure: Injection-steroid-epidural-lumbar L5-S1;  Surgeon: Chirag Kim MD;  Location: [facility] PAIN MANAGEMENT;  Service: Pain Management;  Laterality: N/A;  asa    EPIDURAL STEROID INJECTION INTO LUMBAR SPINE N/A 5/22/2024    Procedure: L5/S1 Interlaminar SOPHY;  Surgeon: Maile Storm DO;  Location: [facility] PAIN MANAGEMENT;  Service: Pain Management;  Laterality: N/A;  20mins-ASA 5d    ESOPHAGOGASTRODUODENOSCOPY N/A 02/07/2023    Procedure: EGD (ESOPHAGOGASTRODUODENOSCOPY);  Surgeon: Dougie Shea MD;  Location: [facility];  Service: Endoscopy;  Laterality: N/A;    FOOT FRACTURE SURGERY      HYSTERECTOMY      VAGINAL HYSTERECTOMY WITHOUT BSO - ENDOMETRIOSIS    INJECTION FOR SENTINEL NODE IDENTIFICATION Right 05/09/2022    Procedure: INJECTION, FOR SENTINEL NODE IDENTIFICATION-Right;  Surgeon: NEAL Dhillon MD;  Location: [facility];  Service: General;  Laterality: Right;    INJECTION OF ANESTHETIC AGENT AROUND MEDIAL BRANCH NERVES INNERVATING LUMBAR FACET JOINT N/A 04/11/2019    Procedure: Lumbo-sacral Block, DR5 and Lateral Branches of S1,S2, S3;  Surgeon: Michelle Pineda Jr., MD;  Location: [facility] PAIN MGT;  Service: Pain Management;  Laterality: N/A;  Pt takes  and states she holds ASA on her own whenever she has procedures.  Instructed to hold x 3 days prior to procedure.      INJECTION OF ANESTHETIC AGENT AROUND MEDIAL BRANCH NERVES INNERVATING LUMBAR FACET JOINT Bilateral 05/03/2023    Procedure: Block-nerve-medial branch-lumbar bilateral L3, L4, L5;  Surgeon: Maile Storm DO;  Location: [facility] PAIN MGT;  Service: Pain Management;  Laterality: Bilateral;  asa    INJECTION OF ANESTHETIC AGENT INTO SACROILIAC JOINT  Right 08/30/2018    Procedure: BLOCK, SACROILIAC JOINT-Right- ORAL SEDATION;  Surgeon: Michelle Pineda Jr., MD;  Location: [facility] PAIN MGT;  Service: Pain Management;  Laterality: Right;    INJECTION OF ANESTHETIC AGENT INTO SACROILIAC JOINT Bilateral 09/27/2018    Procedure: BLOCK, SACROILIAC JOINT-BILATERAL;  Surgeon: Michelle Pineda Jr., MD;  Location: [facility] PAIN MGT;  Service: Pain Management;  Laterality: Bilateral;  Please keep at 10:00 due to trasnsportation    INJECTION OF ANESTHETIC AGENT INTO SACROILIAC JOINT Bilateral 02/07/2019    Procedure: Bilateral Sacroiliac Joint Injection - Per Dr Pineda, not necessary to hold ASA.;  Surgeon: Michelle Pineda Jr., MD;  Location: [facility] PAIN MGT;  Service: Pain Management;  Laterality: Bilateral;    INJECTION, SPINE, LUMBOSACRAL, TRANSFORAMINAL APPROACH Right 7/17/2024    Procedure: Right  L3/4  and L4/5 TFESI;  Surgeon: Maile Storm DO;  [facility];  Service: Pain Management;  Laterality: Right;  20 mins  ASA 5 days    INTRAOCULAR PROSTHESES INSERTION Right 08/01/2019    Procedure: INSERTION, IOL PROSTHESIS;  Surgeon: Karen Song MD;  Location: [address];  Service: Ophthalmology;  Laterality: Right;    INTRAOCULAR PROSTHESES INSERTION Left 09/26/2019    Procedure: INSERTION, IOL PROSTHESIS;  Surgeon: Karen Song MD;  Location: [address];  Service: Ophthalmology;  Laterality: Left;    JOINT REPLACEMENT Right     knee    KNEE SURGERY      MASTECTOMY Right 05/09/2022    Procedure: MASTECTOMY-Right;  Surgeon: NEAL Dhillon MD;  Location: [facility];  Service: General;  Laterality: Right;  2.5 HOURS    PHACOEMULSIFICATION OF CATARACT Right 08/01/2019    Procedure: PHACOEMULSIFICATION, CATARACT;  Surgeon: Karen Song MD;  Location: [address];  Service: Ophthalmology;  Laterality: Right;    PHACOEMULSIFICATION OF CATARACT Left 09/26/2019    Procedure: PHACOEMULSIFICATION, CATARACT;  Surgeon: Karen ON  MD Duncan;  Location: [facility] OR [address] [place] [address];  Service: Ophthalmology;  Laterality: Left;    RADIOFREQUENCY ABLATION, NERVE, PERIPHERAL Right 04/24/2024    Procedure: RIGHT L5 Dorsal Ramus and RIGHT Lateral Branches of S1, S2, and S3;  Surgeon: Maile Storm DO;  [facility];  Service: Pain Management;  Laterality: Right;  30 mins    RADIOFREQUENCY THERMOCOAGULATION Right 05/07/2019    Procedure: RADIOFREQUENCY THERMAL COAGULATION RIGHT DORSAL RAMUS 5 AND LATERAL BRANCH OF S1, S2 AND S3;  Surgeon: Michelle Pineda Jr., MD;  Location: [facility] PAIN T;  Service: Pain Management;  Laterality: Right;    RADIOFREQUENCY THERMOCOAGULATION Left 05/14/2019    Procedure: RADIOFREQUENCY THERMAL COAGULATION LEFT DORSAL RAMUS 5 AND LATERAL BRANCH OF S1,S2 AND S3;  Surgeon: Michelle Pineda Jr., MD;  Location: [facility] PAIN T;  Service: Pain Management;  Laterality: Left;    RADIOFREQUENCY THERMOCOAGULATION Right 10/22/2019    Procedure: RADIOFREQUENCY THERMAL COAGULATION---DARSAL RAMUS 5 and LATERAL S1,S2,and S3 Right;  Surgeon: Michelle Pineda Jr., MD;  Location: [facility] PAIN T;  Service: Pain Management;  Laterality: Right;  patient to sign consent DOS    RADIOFREQUENCY THERMOCOAGULATION Left 10/29/2019    Procedure: RADIOFREQUENCY THERMAL COAGULATION - LEFT - DR5, S1,S2, AND S3;  Surgeon: Michelle Pineda Jr., MD;  Location: [facility] PAIN T;  Service: Pain Management;  Laterality: Left;    RADIOFREQUENCY THERMOCOAGULATION Left 05/26/2020    Procedure: RADIOFREQUENCY THERMAL COAGULATION--Left DR5+ lateral branches of S1, S2, S3;  Surgeon: Michelle Pineda Jr., MD;  Location: [facility] PAIN MGT;  Service: Pain Management;  Laterality: Left;    RADIOFREQUENCY THERMOCOAGULATION Right 06/02/2020    Procedure: RADIOFREQUENCY THERMAL COAGULATION--Right DR5+ lateral branches of S1, S2, S3;  Surgeon: Michelle Pineda Jr., MD;  Location: [facility] PAIN T;  Service: Pain Management;  Laterality: Right;    SENTINEL LYMPH NODE  "BIOPSY Right 05/09/2022    Procedure: BIOPSY, LYMPH NODE, SENTINEL-Right;  Surgeon: NEAL Dhillon MD;  Location: [facility];  Service: General;  Laterality: Right;    SINUS SURGERY      Surgery on right knee  07/09/1982    TONSILLECTOMY      tumor removed from back left side upper shoulder  03/17/2006       Family History   Problem Relation Name Age of Onset    Asthma Mother      Colon cancer Mother      Psoriasis Mother      Cancer Mother          colon    Depression Mother      Cancer Father          lymphoma    Stroke Sister      Diabetes Brother x2     Arthritis Brother x2     Heart disease Brother x2         cad    No Known Problems Daughter      Hypertension Neg Hx      Suicide Neg Hx      Amblyopia Neg Hx      Blindness Neg Hx      Cataracts Neg Hx      Glaucoma Neg Hx      Macular degeneration Neg Hx      Retinal detachment Neg Hx      Strabismus Neg Hx      Eczema Neg Hx      Allergies Neg Hx         OBJECTIVE:     PHYSICAL EXAM:  Vital Signs (Most Recent)  There were no vitals filed for this visit.     ,   Estimated body mass index is 18.15 kg/m² as calculated from the following:    Height as of 12/7/24: 5' 2" (1.575 m).    Weight as of 12/7/24: 45 kg (99 lb 3.3 oz).   General Appearance: Well nourished, well developed, in no acute distress.  HENT: Normal cephalic, oropharynx pink and moist  Eyes: PERRLA bilaterally and EOM x 4  Respiratory: Even and unlabored  Skin: Warm and Dry.   Psychiatric: AAO x 4, Mood & affect are normal.    Physical Exam    MSK: Foot/Ankle - Right: Pain and sensitivity in lateral right ankle. Muscle strength 5/5 in right foot. Pain with walking on toes. Slight improvement in pain when walking on heels.  IMAGING:  - X-ray right foot 12/7: No abnormalities noted by radiologist or reviewing physician         All radiographs were personally reviewed by me.    ASSESSMENT/PLAN:       ICD-10-CM ICD-9-CM   1. Sprain of posterior talofibular ligament of right ankle, initial encounter  " S93.491A 845.09     Assessment & Plan    LIFESTYLE:  - Use Aspercreme or Salonpas patches for pain relief.    MEDICATIONS PRESCRIBED:  - Continue taking hydrocodone or Tramadol as prescribed by PCP.    IMAGING ORDERS:  - Reviewed x-ray taken on December 7th, which showed no abnormalities.  - Will order MRI if symptoms have not improved with conservative treatment.    REFERRALS:  - Referred to physical therapy for ankle sprain treatment and rehabilitation. Bhavna will attend therapy at [facility].    RETURN TO ACTIVITY:  - Wear a boot to support the ankle.    PROCEDURES:  - Performed physical exam of the right foot and ankle, including muscle strength testing and gait assessment.    FOLLOW UP:  - Follow up in 1 month to reevaluate the ankle condition and progress with physical therapy.       This note was generated with the assistance of ambient listening technology. Verbal consent was obtained by the patient and accompanying visitor(s) for the recording of patient appointment to facilitate this note. I attest to having reviewed and edited the generated note for accuracy, though some syntax or spelling errors may persist. Please contact the author of this note for any clarification.

## 2024-12-17 ENCOUNTER — TELEPHONE (OUTPATIENT)
Dept: INTERNAL MEDICINE | Facility: CLINIC | Age: 69
End: 2024-12-17
Payer: MEDICARE

## 2024-12-17 DIAGNOSIS — M54.50 CHRONIC LOW BACK PAIN WITHOUT SCIATICA, UNSPECIFIED BACK PAIN LATERALITY: ICD-10-CM

## 2024-12-17 DIAGNOSIS — G89.29 CHRONIC LOW BACK PAIN WITHOUT SCIATICA, UNSPECIFIED BACK PAIN LATERALITY: ICD-10-CM

## 2024-12-17 DIAGNOSIS — M46.1 BILATERAL SACROILIITIS: ICD-10-CM

## 2024-12-17 NOTE — TELEPHONE ENCOUNTER
----- Message from Julia sent at 12/17/2024 11:04 AM CST -----  Contact: [phone]  Requesting an RX refill or new RX.    Is this a refill or new RX:     RX name and strength (copy/paste from chart):  HYDROcodone-acetaminophen (NORCO) 5-325 mg per tablet 60 tablet    Is this a 30 day or 90 day RX: 60    Pharmacy name and phone # (copy/paste from chart):   [id number] - [place] - [address] AT [address] [place] [address]  Phone: [phone] Fax: [phone]    Requesting an RX refill or new RX.    Is this a refill or new RX:     RX name and strength (copy/paste from chart):  LIDOcaine (LIDODERM) 5 % 30 patch    Is this a 30 day or 90 day RX:     Pharmacy name and phone # (copy/paste from chart):    [facility] #[id number] - [place] - [address] AT [place] [address] [address] [place] [address]  Phone: [phone] Fax: [phone]

## 2024-12-17 NOTE — TELEPHONE ENCOUNTER
No care due was identified.  [facility] Embedded Care Due Messages. Reference number: [id number].   12/17/2024 4:13:57 PM CST

## 2024-12-17 NOTE — TELEPHONE ENCOUNTER
Pls call- she is requesting hydrocodone , but last visit in November she was told to stop and take Tramadol tid    Did she try that regimen?

## 2024-12-18 RX ORDER — HYDROCODONE BITARTRATE AND ACETAMINOPHEN 5; 325 MG/1; MG/1
1 TABLET ORAL EVERY 8 HOURS PRN
Qty: 90 TABLET | Refills: 0 | Status: SHIPPED | OUTPATIENT
Start: 2024-12-18

## 2024-12-18 RX ORDER — LIDOCAINE 50 MG/G
1 PATCH TOPICAL DAILY
Qty: 30 PATCH | Refills: 0 | Status: SHIPPED | OUTPATIENT
Start: 2024-12-18

## 2024-12-23 ENCOUNTER — LAB VISIT (OUTPATIENT)
Dept: LAB | Facility: HOSPITAL | Age: 69
End: 2024-12-23
Attending: INTERNAL MEDICINE
Payer: MEDICARE

## 2024-12-23 DIAGNOSIS — L40.50 PSA (PSORIATIC ARTHRITIS): ICD-10-CM

## 2024-12-23 LAB
ALBUMIN SERPL BCP-MCNC: 3.5 G/DL (ref 3.5–5.2)
ALP SERPL-CCNC: 66 U/L (ref 40–150)
ALT SERPL W/O P-5'-P-CCNC: 10 U/L (ref 10–44)
ANION GAP SERPL CALC-SCNC: 7 MMOL/L (ref 8–16)
AST SERPL-CCNC: 18 U/L (ref 10–40)
BASOPHILS # BLD AUTO: 0.06 K/UL (ref 0–0.2)
BASOPHILS NFR BLD: 0.7 % (ref 0–1.9)
BILIRUB SERPL-MCNC: 0.2 MG/DL (ref 0.1–1)
BUN SERPL-MCNC: 8 MG/DL (ref 8–23)
CALCIUM SERPL-MCNC: 8.9 MG/DL (ref 8.7–10.5)
CHLORIDE SERPL-SCNC: 97 MMOL/L (ref 95–110)
CO2 SERPL-SCNC: 27 MMOL/L (ref 23–29)
CREAT SERPL-MCNC: 0.7 MG/DL (ref 0.5–1.4)
CRP SERPL-MCNC: 1.4 MG/L (ref 0–8.2)
DIFFERENTIAL METHOD BLD: ABNORMAL
EOSINOPHIL # BLD AUTO: 0.2 K/UL (ref 0–0.5)
EOSINOPHIL NFR BLD: 1.7 % (ref 0–8)
ERYTHROCYTE [DISTWIDTH] IN BLOOD BY AUTOMATED COUNT: 15.7 % (ref 11.5–14.5)
ERYTHROCYTE [SEDIMENTATION RATE] IN BLOOD BY PHOTOMETRIC METHOD: 4 MM/HR (ref 0–36)
EST. GFR  (NO RACE VARIABLE): >60 ML/MIN/1.73 M^2
GLUCOSE SERPL-MCNC: 87 MG/DL (ref 70–110)
HCT VFR BLD AUTO: 40.4 % (ref 37–48.5)
HGB BLD-MCNC: 12.8 G/DL (ref 12–16)
IMM GRANULOCYTES # BLD AUTO: 0.03 K/UL (ref 0–0.04)
IMM GRANULOCYTES NFR BLD AUTO: 0.3 % (ref 0–0.5)
LYMPHOCYTES # BLD AUTO: 1.7 K/UL (ref 1–4.8)
LYMPHOCYTES NFR BLD: 18.1 % (ref 18–48)
MCH RBC QN AUTO: 31.9 PG (ref 27–31)
MCHC RBC AUTO-ENTMCNC: 31.7 G/DL (ref 32–36)
MCV RBC AUTO: 101 FL (ref 82–98)
MONOCYTES # BLD AUTO: 1 K/UL (ref 0.3–1)
MONOCYTES NFR BLD: 11.1 % (ref 4–15)
NEUTROPHILS # BLD AUTO: 6.3 K/UL (ref 1.8–7.7)
NEUTROPHILS NFR BLD: 68.1 % (ref 38–73)
NRBC BLD-RTO: 0 /100 WBC
PLATELET # BLD AUTO: 422 K/UL (ref 150–450)
PMV BLD AUTO: 8.6 FL (ref 9.2–12.9)
POTASSIUM SERPL-SCNC: 4.2 MMOL/L (ref 3.5–5.1)
PROT SERPL-MCNC: 6 G/DL (ref 6–8.4)
RBC # BLD AUTO: 4.01 M/UL (ref 4–5.4)
SODIUM SERPL-SCNC: 131 MMOL/L (ref 136–145)
WBC # BLD AUTO: 9.21 K/UL (ref 3.9–12.7)

## 2024-12-23 PROCEDURE — 85652 RBC SED RATE AUTOMATED: CPT | Performed by: INTERNAL MEDICINE

## 2024-12-23 PROCEDURE — 86140 C-REACTIVE PROTEIN: CPT | Performed by: INTERNAL MEDICINE

## 2024-12-23 PROCEDURE — 36415 COLL VENOUS BLD VENIPUNCTURE: CPT | Mod: PO | Performed by: INTERNAL MEDICINE

## 2024-12-23 PROCEDURE — 80053 COMPREHEN METABOLIC PANEL: CPT | Performed by: INTERNAL MEDICINE

## 2024-12-23 PROCEDURE — 85025 COMPLETE CBC W/AUTO DIFF WBC: CPT | Performed by: INTERNAL MEDICINE

## 2024-12-26 ENCOUNTER — OFFICE VISIT (OUTPATIENT)
Dept: INTERNAL MEDICINE | Facility: CLINIC | Age: 69
End: 2024-12-26
Payer: MEDICARE

## 2024-12-26 ENCOUNTER — TELEPHONE (OUTPATIENT)
Dept: INTERNAL MEDICINE | Facility: CLINIC | Age: 69
End: 2024-12-26
Payer: MEDICARE

## 2024-12-26 VITALS
HEIGHT: 62 IN | WEIGHT: 101.44 LBS | BODY MASS INDEX: 18.67 KG/M2 | DIASTOLIC BLOOD PRESSURE: 72 MMHG | SYSTOLIC BLOOD PRESSURE: 154 MMHG | HEART RATE: 75 BPM | OXYGEN SATURATION: 98 %

## 2024-12-26 DIAGNOSIS — J30.9 ALLERGIC RHINITIS, UNSPECIFIED SEASONALITY, UNSPECIFIED TRIGGER: ICD-10-CM

## 2024-12-26 DIAGNOSIS — J32.9 CHRONIC SINUSITIS, UNSPECIFIED LOCATION: Primary | ICD-10-CM

## 2024-12-26 DIAGNOSIS — H66.90 ACUTE OTITIS MEDIA, UNSPECIFIED OTITIS MEDIA TYPE: ICD-10-CM

## 2024-12-26 DIAGNOSIS — R51.9 FACE PAIN: ICD-10-CM

## 2024-12-26 LAB
CTP QC/QA: YES
CTP QC/QA: YES
POC MOLECULAR INFLUENZA A AGN: NEGATIVE
POC MOLECULAR INFLUENZA B AGN: NEGATIVE
SARS-COV-2 RDRP RESP QL NAA+PROBE: NEGATIVE

## 2024-12-26 PROCEDURE — 99999 PR PBB SHADOW E&M-EST. PATIENT-LVL V: CPT | Mod: PBBFAC,,,

## 2024-12-26 RX ORDER — AZELASTINE 1 MG/ML
1 SPRAY, METERED NASAL 2 TIMES DAILY
Qty: 30 ML | Refills: 0 | Status: SHIPPED | OUTPATIENT
Start: 2024-12-26 | End: 2025-12-26

## 2024-12-26 RX ORDER — LEVOFLOXACIN 500 MG/1
500 TABLET, FILM COATED ORAL DAILY
Qty: 7 TABLET | Refills: 0 | Status: SHIPPED | OUTPATIENT
Start: 2024-12-26 | End: 2025-01-02

## 2024-12-26 NOTE — TELEPHONE ENCOUNTER
----- Message from Ro sent at 12/26/2024  7:34 AM CST -----  Contact: [phone]  [email or web address]     Patient is calling for Medical Advice regarding: Patient would like to be seen today for recurring symptoms.    How long has patient had these symptoms: over 2 weeks    Pharmacy name and phone#:    Patient wants a call back or thru [facility]: call    Comments:Please call and advise    Please advise patient replies from provider may take up to 48 hours.

## 2024-12-26 NOTE — PROGRESS NOTES
INTERNAL MEDICINE URGENT CARE NOTE    CHIEF COMPLAINT     Chief Complaint   Patient presents with    Wheezing    Cough    Nasal Congestion    Fever    Generalized Body Aches       HPI     SINUSITIS:  She has had sinusitis for approximately two months with nasal congestion and thick mucus discharge that varies in color between yellow, green, and clear. She has been seen by providers three times in the last 2 months. Seen 10/23/24 and diagnosed with sinusitis, prescribed Augmentin. Seen 11/11/24 and diagnosed with sinusitis, prescribed doxycycline. Seen 11/19/24 and again diagnosed with sinusitis and treated with a longer course of Augmentin.   Today she reports right-sided sinus swelling and pain (6/10) with tenderness in the right frontal and maxillary sinuses, accompanied by forehead headaches (3/10), both alleviated with Tylenol. She notes low-grade fever with baseline temperature typically 97.2, reaching 101 degrees two days ago.  Denies neck stiffness, rash, or CP.    RESPIRATORY:  She reports wheezing, particularly when lying down or after exertion, accompanied by shortness of breath for about three weeks. She uses albuterol inhaler up to 3 times daily for chronic wheezing.    CONSTITUTIONAL:  She reports significant fatigue and weakness.    ROS:  General: +fever, -chills, +fatigue, -weight gain, -weight loss  Eyes: -vision changes, -redness, -discharge  ENT: -ear pain, +nasal congestion, -sore throat, +sinus pain and pressure   Cardiovascular: -chest pain, -palpitations, -lower extremity edema  Respiratory: +cough, +shortness of breath  Gastrointestinal: -abdominal pain, -nausea, -vomiting, -diarrhea, -constipation, -blood in stool  Genitourinary: -dysuria, -hematuria, -frequency  Musculoskeletal: -joint pain, -muscle pain  Skin: -rash, -lesion  Neurological: -headache, -dizziness, -numbness, -tingling, +weakness  Psychiatric: -anxiety, -depression, -sleep difficulty       Past Medical History:  Past Medical  History:   Diagnosis Date    Allergy     Amblyopia     Anemia     Anticoagulant long-term use     Arthritis 02/02/1992    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, estrogen receptor positive 04/13/2022    Cataract     Depression     Dry eyes     Dry mouth     Duane's syndrome of right eye     Early dry stage nonexudative age-related macular degeneration of both eyes 09/20/2022    Fibromyalgia 04/17/2014    Fibromyalgia     Fractured hip     RIGHT HIP    GERD (gastroesophageal reflux disease)     History of psychiatric hospitalization     Hyperlipidemia 02/02/1992    Hypertension     Hypocalcemia     Hyponatremia     Kidney stone     Migraine headache     Osteoporosis     Pressure ulcer of unspecified site, unspecified stage     Psoriatic arthritis 02/02/1992    Recurrent upper respiratory infection (URI)     Right knee pain     post knee replacement surgery (possible rejectiion of metal)    RLS (restless legs syndrome)     Schizophrenia 02/02/1992    stable on meds    Sciatica     Squamous cell carcinoma of skin     Urinary tract infection      Home Medications:  Prior to Admission medications    Medication Sig Start Date End Date Taking? Authorizing Provider   albuterol (PROVENTIL) 2.5 mg /3 mL (0.083 %) nebulizer solution Take 3 mLs (2.5 mg total) by nebulization every 6 (six) hours as needed for Wheezing. Rescue 12/9/24 12/9/25  Sadaf Vargas MD   albuterol (PROVENTIL/VENTOLIN HFA) 90 mcg/actuation inhaler USE 2 INHALATIONS BY MOUTH 4  TIMES DAILY AS NEEDED 4/24/24   Sadaf Vargas MD   amLODIPine (NORVASC) 5 MG tablet Take 1 tablet (5 mg total) by mouth 2 (two) times a day. 4/25/24   Camilla Nguyễn MD   ascorbic acid, vitamin C, (VITAMIN C) 500 MG tablet Take 500 mg by mouth once daily.    Provider, Historical   aspirin (ECOTRIN) 81 MG EC tablet Take 81 mg by mouth once daily.    Provider, Historical   atorvastatin (LIPITOR) 80 MG tablet Take 1 tablet (80 mg total) by mouth once  daily. 10/9/24   Sadaf Vargas MD   baclofen (LIORESAL) 10 MG tablet 1 tab po tid for muscle cramps. 3/18/24   Sadaf Vargas MD   benztropine (COGENTIN) 0.5 MG tablet TAKE 1 TABLET(0.5 MG) BY MOUTH TWICE DAILY 2/26/24   Carol Ann Poe MD   budesonide-formoterol 80-4.5 mcg (SYMBICORT) 80-4.5 mcg/actuation HFAA INHALE 2 PUFFS BY MOUTH TWICE DAILY 8/15/24   Sadaf Vargas MD   exemestane (AROMASIN) 25 mg tablet Take 1 tablet (25 mg total) by mouth once daily. 10/9/24   Cheyanne Smith NP   fluticasone propionate (FLONASE) 50 mcg/actuation nasal spray SHAKE LIQUID AND USE 1 SPRAY(50 MCG) IN EACH NOSTRIL DAILY 12/3/24   Sadaf Vargas MD   folic acid (FOLVITE) 1 MG tablet Take 1 tablet (1,000 mcg total) by mouth once daily. 2/23/24   Mode Cornelius MD   HYDROcodone-acetaminophen (NORCO) 5-325 mg per tablet Take 1 tablet by mouth every 8 (eight) hours as needed for Pain. 12/18/24   Sadaf Vargas MD   immun glob G,IgG,-pro-IgA 0-50 (HIZENTRA) 4 gram/20 mL (20 %) Soln Inject 60 mLs (12 g total) into the skin every 14 (fourteen) days. 9/30/24 9/30/25  Jeff Benítez MD   isosorbide mononitrate (IMDUR) 30 MG 24 hr tablet Take 1 tablet (30 mg total) by mouth once daily. 9/3/24 9/3/25  Philipp Goel MD   LIDOcaine (LIDODERM) 5 % Place 1 patch onto the skin once daily. Remove & Discard patch within 12 hours or as directed by MD 12/18/24   Sadaf Vargas MD   meloxicam (MOBIC) 7.5 MG tablet Take 1 tablet (7.5 mg total) by mouth once daily. 7/5/24   FRANDY Moss, NP   methotrexate 2.5 MG Tab Take 8 tablets (20 mg total) by mouth every 7 days. TAKE 4 TABLETS MONDAY MORNING AND TAKE 4 TABLETS MONDAY NIGHT ONLY 9/19/24   Mode Cornelius MD   omega-3 fatty acids/fish oil (FISH OIL-OMEGA-3 FATTY ACIDS) 300-1,000 mg capsule Take by mouth once daily.    Provider, Historical   omeprazole (PRILOSEC) 40 MG capsule TAKE 1 CAPSULE(40 MG) BY MOUTH EVERY MORNING 11/18/24   Sadaf Vargas MD    pregabalin (LYRICA) 25 MG capsule TAKE 1 CAPSULE BY MOUTH EVERY EVENING 9/30/24   Maile Storm,    pregabalin (LYRICA) 50 MG capsule TAKE 1 CAPSULE EVERY EVENING 9/30/24   Maile Storm DO   risperiDONE (RISPERDAL) 2 MG tablet TAKE 1 TABLET(2 MG) BY MOUTH EVERY MORNING 2/19/24   Carol Ann Poe MD   risperiDONE (RISPERDAL) 4 MG tablet TAKE 1 TABLET(4 MG) BY MOUTH EVERY EVENING 3/7/24   Carol Ann Poe MD   traMADoL (ULTRAM) 50 mg tablet Take 1 tablet (50 mg total) by mouth every 8 (eight) hours as needed for Pain. 11/19/24   Sadaf Vargas MD   valsartan (DIOVAN) 80 MG tablet Take 1 tablet (80 mg total) by mouth 2 (two) times daily. 11/18/24 11/18/25  Sadaf Vargas MD   venlafaxine (EFFEXOR-XR) 75 MG 24 hr capsule Take 1 capsule (75 mg total) by mouth once daily. 12/28/23   Carol Ann Poe MD   vitamin D (VITAMIN D3) 1000 units Tab Take 1,000 Units by mouth once daily.    Provider, Historical   vitamin E 200 UNIT capsule Take 200 Units by mouth once daily.    Provider, Historical   zinc gluconate 50 mg tablet Take 50 mg by mouth once daily.    Provider, Historical     PHYSICAL EXAM     General: No acute distress. Well-developed. Well-nourished.  Eyes: EOMI. Sclerae anicteric.  HENT: Normocephalic. Atraumatic. Nares patent. Moist oral mucosa. Right frontal sinus tenderness. Right maxillary sinus tenderness. All normal.  Ears: Left ear TM clear. Right ear- bulging, cloudy, and erythematous TM. Right ear pain on manipulation.  Cardiovascular: Regular rate. Regular rhythm. No murmurs. No rubs. No gallops. Normal S1, S2.  Respiratory: Normal respiratory effort. Clear to auscultation bilaterally. No rales. No rhonchi. No wheezing.  Abdomen: Soft. Non-tender. Non-distended. Normoactive bowel sounds.  Musculoskeletal: No  obvious deformity.  Extremities: No lower extremity edema.  Neurological: Alert & oriented x3. No slurred speech. Normal gait.  Psychiatric: Normal mood. Normal  "affect. Good insight. Good judgment.  Skin: Warm. Dry. No rash.        BP (!) 154/72   Pulse 75   Ht 5' 2" (1.575 m)   Wt 46 kg (101 lb 6.6 oz)   SpO2 98%   BMI 18.55 kg/m²     ASSESSMENT/PLAN     IMPRESSION:  - Patient presents with  recurrent bacterial sinus infection and AOM of right ear based on symptoms and physical exam findings. Considered CT imagining to determine possible anatomical issues, obstructions, persistent inflammation/fluid build up.  - Opted for antibiotic treatment due to presence of fever, prolonged symptoms  - Addressed concurrent wheezing with short-term increased use of albuterol inhaler  - Added Astelin nasal spray to current regimen of Flonase for enhanced symptom relief    ASTHMA:  - Explained the difference between as-needed albuterol use and daily preventive inhalers   - Bhavna to discuss frequent albuterol use with allergy specialist at upcoming appointment   - Increased albuterol inhaler: Use 3 times daily for 5 days, then return to as-needed use.    SINUSITIS AND POSTNASAL DRIP:  - Started Astelin nasal spray: Use 2 times daily for 1 week.  - Continued Flonase nasal spray: Use as previously prescribed.  - Levaquin 500 mg po q day x 7 days  - Maxillofacial CT ordered    FEVER AND PAIN MANAGEMENT:  - Continued Tylenol: Use as needed for pain and fever.    Patient education provided from Lisa. Patient was counseled on when and how to seek emergent care.     This note was generated with the assistance of ambient listening technology. Verbal consent was obtained by the patient and accompanying visitor(s) for the recording of patient appointment to facilitate this note. I attest to having reviewed and edited the generated note for accuracy, though some syntax or spelling errors may persist. Please contact the author of this note for any clarification.       Karlee THAKUR, APRN, FNP-c   Department of Internal Medicine - [facility]  12:36 PM    "

## 2024-12-27 ENCOUNTER — HOSPITAL ENCOUNTER (EMERGENCY)
Facility: HOSPITAL | Age: 69
Discharge: HOME | End: 2024-12-27
Attending: EMERGENCY MEDICINE
Payer: MEDICARE

## 2024-12-27 ENCOUNTER — TELEPHONE (OUTPATIENT)
Dept: PAIN MEDICINE | Facility: CLINIC | Age: 69
End: 2024-12-27
Payer: MEDICARE

## 2024-12-27 VITALS
HEIGHT: 62 IN | SYSTOLIC BLOOD PRESSURE: 175 MMHG | TEMPERATURE: 98 F | OXYGEN SATURATION: 97 % | RESPIRATION RATE: 18 BRPM | DIASTOLIC BLOOD PRESSURE: 81 MMHG | HEART RATE: 83 BPM | WEIGHT: 101 LBS | BODY MASS INDEX: 18.58 KG/M2

## 2024-12-27 DIAGNOSIS — I10 ESSENTIAL HYPERTENSION: Chronic | ICD-10-CM

## 2024-12-27 DIAGNOSIS — R06.02 SHORTNESS OF BREATH: ICD-10-CM

## 2024-12-27 DIAGNOSIS — I10 HYPERTENSION, UNSPECIFIED TYPE: Primary | ICD-10-CM

## 2024-12-27 LAB
ALBUMIN SERPL BCP-MCNC: 3.9 G/DL (ref 3.5–5.2)
ALP SERPL-CCNC: 64 U/L (ref 40–150)
ALT SERPL W/O P-5'-P-CCNC: 10 U/L (ref 10–44)
ANION GAP SERPL CALC-SCNC: 11 MMOL/L (ref 8–16)
AST SERPL-CCNC: 17 U/L (ref 10–40)
BASOPHILS # BLD AUTO: 0.07 K/UL (ref 0–0.2)
BASOPHILS NFR BLD: 0.9 % (ref 0–1.9)
BILIRUB SERPL-MCNC: 0.2 MG/DL (ref 0.1–1)
BILIRUB UR QL STRIP: NEGATIVE
BNP SERPL-MCNC: 54 PG/ML (ref 0–99)
BUN SERPL-MCNC: 9 MG/DL (ref 8–23)
CALCIUM SERPL-MCNC: 10.1 MG/DL (ref 8.7–10.5)
CHLORIDE SERPL-SCNC: 99 MMOL/L (ref 95–110)
CLARITY UR: CLEAR
CO2 SERPL-SCNC: 25 MMOL/L (ref 23–29)
COLOR UR: COLORLESS
CREAT SERPL-MCNC: 0.8 MG/DL (ref 0.5–1.4)
DIFFERENTIAL METHOD BLD: ABNORMAL
EOSINOPHIL # BLD AUTO: 0.2 K/UL (ref 0–0.5)
EOSINOPHIL NFR BLD: 2.1 % (ref 0–8)
ERYTHROCYTE [DISTWIDTH] IN BLOOD BY AUTOMATED COUNT: 15.1 % (ref 11.5–14.5)
EST. GFR  (NO RACE VARIABLE): >60 ML/MIN/1.73 M^2
GLUCOSE SERPL-MCNC: 94 MG/DL (ref 70–110)
GLUCOSE UR QL STRIP: NEGATIVE
HCT VFR BLD AUTO: 39.8 % (ref 37–48.5)
HGB BLD-MCNC: 13.2 G/DL (ref 12–16)
HGB UR QL STRIP: NEGATIVE
IMM GRANULOCYTES # BLD AUTO: 0.03 K/UL (ref 0–0.04)
IMM GRANULOCYTES NFR BLD AUTO: 0.4 % (ref 0–0.5)
KETONES UR QL STRIP: NEGATIVE
LEUKOCYTE ESTERASE UR QL STRIP: NEGATIVE
LYMPHOCYTES # BLD AUTO: 1.5 K/UL (ref 1–4.8)
LYMPHOCYTES NFR BLD: 19.2 % (ref 18–48)
MCH RBC QN AUTO: 32.1 PG (ref 27–31)
MCHC RBC AUTO-ENTMCNC: 33.2 G/DL (ref 32–36)
MCV RBC AUTO: 97 FL (ref 82–98)
MONOCYTES # BLD AUTO: 0.7 K/UL (ref 0.3–1)
MONOCYTES NFR BLD: 8.7 % (ref 4–15)
NEUTROPHILS # BLD AUTO: 5.5 K/UL (ref 1.8–7.7)
NEUTROPHILS NFR BLD: 68.7 % (ref 38–73)
NITRITE UR QL STRIP: NEGATIVE
NRBC BLD-RTO: 0 /100 WBC
OHS QRS DURATION: 76 MS
OHS QTC CALCULATION: 424 MS
PH UR STRIP: 8 [PH] (ref 5–8)
PLATELET # BLD AUTO: 386 K/UL (ref 150–450)
PMV BLD AUTO: 8.4 FL (ref 9.2–12.9)
POTASSIUM SERPL-SCNC: 4 MMOL/L (ref 3.5–5.1)
PROT SERPL-MCNC: 6.7 G/DL (ref 6–8.4)
PROT UR QL STRIP: NEGATIVE
RBC # BLD AUTO: 4.11 M/UL (ref 4–5.4)
SODIUM SERPL-SCNC: 135 MMOL/L (ref 136–145)
SP GR UR STRIP: 1 (ref 1–1.03)
TROPONIN I SERPL DL<=0.01 NG/ML-MCNC: 0.01 NG/ML (ref 0–0.03)
URN SPEC COLLECT METH UR: ABNORMAL
UROBILINOGEN UR STRIP-ACNC: NEGATIVE EU/DL
WBC # BLD AUTO: 8.02 K/UL (ref 3.9–12.7)

## 2024-12-27 PROCEDURE — 83880 ASSAY OF NATRIURETIC PEPTIDE: CPT | Performed by: EMERGENCY MEDICINE

## 2024-12-27 PROCEDURE — 93010 ELECTROCARDIOGRAM REPORT: CPT | Mod: ,,, | Performed by: INTERNAL MEDICINE

## 2024-12-27 PROCEDURE — 84484 ASSAY OF TROPONIN QUANT: CPT | Performed by: EMERGENCY MEDICINE

## 2024-12-27 PROCEDURE — 25000003 PHARM REV CODE 250: Performed by: EMERGENCY MEDICINE

## 2024-12-27 PROCEDURE — 80053 COMPREHEN METABOLIC PANEL: CPT | Performed by: EMERGENCY MEDICINE

## 2024-12-27 PROCEDURE — 81003 URINALYSIS AUTO W/O SCOPE: CPT | Performed by: EMERGENCY MEDICINE

## 2024-12-27 PROCEDURE — 93005 ELECTROCARDIOGRAM TRACING: CPT

## 2024-12-27 PROCEDURE — 85025 COMPLETE CBC W/AUTO DIFF WBC: CPT | Performed by: EMERGENCY MEDICINE

## 2024-12-27 PROCEDURE — 99285 EMERGENCY DEPT VISIT HI MDM: CPT | Mod: 25

## 2024-12-27 RX ORDER — AMLODIPINE BESYLATE 5 MG/1
5 TABLET ORAL
Status: COMPLETED | OUTPATIENT
Start: 2024-12-27 | End: 2024-12-27

## 2024-12-27 RX ORDER — VALSARTAN 160 MG/1
160 TABLET ORAL DAILY
Qty: 30 TABLET | Refills: 3 | Status: SHIPPED | OUTPATIENT
Start: 2024-12-27

## 2024-12-27 RX ADMIN — AMLODIPINE BESYLATE 5 MG: 5 TABLET ORAL at 02:12

## 2024-12-27 NOTE — ED NOTES
Pt seen in room AAOx4, states she started feeling SOB last night and was unable to sleep due to exacerbating symptoms while lying flat.

## 2024-12-27 NOTE — DISCHARGE INSTRUCTIONS
Progress Note - Hospitalist   Name: Papo Gibbs 53 y.o. male I MRN: [id number]  Unit/Bed#: S -01 I Date of Admission: 12/8/2024   Date of Service: 12/10/2024 I Hospital Day: 2    Assessment & Plan  Shortness of breath  Patient normally requires 2 to 3 L nasal cannula currently on baseline however with severely worsening dyspnea over the past several days acutely in the setting of chronic dyspnea for the past 1 to 2 months  SpO2 has remained stable on home oxygen during hospital stay, acute respiratory failure ruled out  Completed course of steroids and antibiotics outpatient without any significant improvement  CT chest showing progression of lymphangitic carcinomatosis of right lung, increased loculated moderate right pleural effusion, no PE, new interstitial edema of left lung, with possible small pneumonia in superior segment of left lower lobe, increasing mediastinal lymphadenopathy  He does continue to complain of significant dyspnea on exertion  Status post IR thoracentesis for 1.5 L yesterday, follow-up final cytology  Repeat chest x-ray following the procedure with reaccumulation of fluid, awaiting cytology to determine next steps  Appreciate pulmonology recommendations  HTN (hypertension)  Will continue patient's home losartan 25 mg daily  Adenocarcinoma of overlapping sites of right lung (HCC)  Patient follows with Dr. Ramachandran from heme-onc as an outpatient, last chemotherapy 1 week ago patient  Keytruda held due to potential pneumonitis patient recently completed steroid taper  History of pulmonary embolism  History of pulmonary embolism on Eliquis, continue  Pleural effusion  Follow up cytology from thoracentesis   Tachycardia  HR elevated in the 120s-130s during hospital stay, likely reactive from acute on chronic medical problems  Patient denies chest pain or palpitations, notes he is always somewhat tachycardic but typically appears to be in the 100s  Does have associated elevated troponin,  Start taking 160 mg of valsartan every morning.  Continue amlodipine.  You must follow up with the primary physician as soon as able for recheck and further medication adjustment.   seen in consult cardiology  Echocardiogram without wall motion abnormalities or valvular abnormalities  Continue telemetry monitoring  Continue to treat pain  Blood cultures negative, no evidence of additional infection, continue monitoring off antibiotics  Does have evidence of coronary artery calcifications, considering eventual outpatient stress test once improved from pulmonary perspective    VTE Pharmacologic Prophylaxis:   High Risk (Score >/= 5) - Pharmacological DVT Prophylaxis Ordered: apixaban (Eliquis). Sequential Compression Devices Ordered.    Mobility:   Basic Mobility Inpatient Raw Score: 24  JH-HLM Goal: 8: Walk 250 feet or more  JH-HLM Achieved: 7: Walk 25 feet or more  JH-HLM Goal NOT achieved. Continue with multidisciplinary rounding and encourage appropriate mobility to improve upon JH-HLM goals.    Patient Centered Rounds: I performed bedside rounds with nursing staff today.   Discussions with Specialists or Other Care Team Provider: Pulmonology, cardiology    Education and Discussions with Family / Patient:  Patient .     Current Length of Stay: 2 day(s)  Current Patient Status: Inpatient   Certification Statement: The patient will continue to require additional inpatient hospital stay due to shortness of breath pending cytology from thoracentesis and pulmonology recommendations  Discharge Plan: Anticipate discharge in 24-48 hrs to home.    Code Status: Level 1 - Full Code    Subjective   Patient seen and examined at bedside.  Notes he was able to sleep slightly better overnight than he has been in the past few days but still feels significantly short of breath with any exertion.  Denies any chest pain.    Objective :  Temp:  [98 °F (36.7 °C)-100 °F (37.8 °C)] 98 °F (36.7 °C)  HR:  [122-135] 125  BP: (102-146)/(62-89) 127/77  Resp:  [16-19] 18  SpO2:  [88 %-99 %] 99 %  O2 Device: None (Room air)    Body mass index is 30.32 kg/m².     Input and Output Summary (last 24 hours):     Intake/Output  Summary (Last 24 hours) at 12/10/2024 1104  Last data filed at 12/10/2024 0912  Gross per 24 hour   Intake 220 ml   Output 1675 ml   Net -1455 ml       Physical Exam  Vitals reviewed.   Constitutional:       General: He is not in acute distress.  HENT:      Head: Normocephalic and atraumatic.   Eyes:      General: No scleral icterus.     Conjunctiva/sclera: Conjunctivae normal.   Cardiovascular:      Rate and Rhythm: Normal rate and regular rhythm.      Heart sounds: No murmur heard.  Pulmonary:      Effort: Pulmonary effort is normal.      Breath sounds: Normal breath sounds.   Abdominal:      General: Bowel sounds are normal. There is no distension.      Palpations: Abdomen is soft.      Tenderness: There is no abdominal tenderness.   Musculoskeletal:      Cervical back: Neck supple.      Right lower leg: No edema.      Left lower leg: No edema.   Skin:     General: Skin is warm and dry.   Neurological:      Mental Status: He is alert and oriented to person, place, and time.   Psychiatric:         Mood and Affect: Mood normal.         Behavior: Behavior normal.         Lines/Drains:      Central Line:  Goal for removal: Will discontinue when meds requiring line are completed.                 Lab Results: I have reviewed the following results:   Results from last 7 days   Lab Units 12/10/24  0539 12/09/24  0614   WBC Thousand/uL 5.57 5.72   HEMOGLOBIN g/dL 9.9* 9.5*   HEMATOCRIT % 30.4* 29.7*   PLATELETS Thousands/uL 148* 122*   SEGS PCT %  --  74   LYMPHO PCT %  --  15   MONO PCT %  --  10   EOS PCT %  --  0     Results from last 7 days   Lab Units 12/10/24  0539 12/09/24  0614   SODIUM mmol/L 134* 136   POTASSIUM mmol/L 3.6 3.5   CHLORIDE mmol/L 98 99   CO2 mmol/L 29 31   BUN mg/dL 12 12   CREATININE mg/dL 0.66 0.67   ANION GAP mmol/L 7 6   CALCIUM mg/dL 8.4 8.5   ALBUMIN g/dL  --  3.1*   TOTAL BILIRUBIN mg/dL  --  0.43   ALK PHOS U/L  --  61   ALT U/L  --  24   AST U/L  --  17   GLUCOSE RANDOM mg/dL 243* 210*      Results from last 7 days   Lab Units 12/09/24  0614   INR  1.35*     Results from last 7 days   Lab Units 12/10/24  0713 12/09/24  2051 12/09/24  1632 12/09/24  1045 12/09/24  0703 12/08/24  2059 12/08/24  1816   POC GLUCOSE mg/dl 235* 281* 291* 280* 210* 259* 231*     Results from last 7 days   Lab Units 12/09/24  0614   HEMOGLOBIN A1C % 8.1*     Results from last 7 days   Lab Units 12/08/24  1118   LACTIC ACID mmol/L 1.0   PROCALCITONIN ng/ml 0.17       Recent Cultures (last 7 days):   Results from last 7 days   Lab Units 12/09/24  1214 12/08/24  1129 12/08/24  1118   BLOOD CULTURE   --  No Growth at 24 hrs. No Growth at 24 hrs.   GRAM STAIN RESULT  1+ Mononuclear Cells  No bacteria seen  --   --              Last 24 Hours Medication List:     Current Facility-Administered Medications:     acetaminophen (TYLENOL) tablet 975 mg, Q8H PRN    albuterol (PROVENTIL HFA,VENTOLIN HFA) inhaler 2 puff, Q6H PRN    amitriptyline (ELAVIL) tablet 200 mg, HS    apixaban (ELIQUIS) tablet 5 mg, BID    atorvastatin (LIPITOR) tablet 10 mg, Daily    folic acid (FOLVITE) tablet 1,000 mcg, Daily    furosemide (LASIX) injection 20 mg, Daily    insulin lispro (HumALOG/ADMELOG) 100 units/mL subcutaneous injection 1-5 Units, 4x Daily (AC & HS) **AND** Fingerstick Glucose (POCT), 4x Daily AC and at bedtime    ipratropium-albuterol (DUO-NEB) 0.5-2.5 mg/3 mL inhalation solution 3 mL, Q6H PRN    levothyroxine tablet 25 mcg, Early Morning    losartan (COZAAR) tablet 25 mg, Daily    umeclidinium 62.5 mcg/actuation inhaler AEPB 1 puff, Daily **AND** olodaterol HCl (STRIVERDI RESPIMAT) inhaler 2 puff, Daily    oxyCODONE (ROXICODONE) split tablet 2.5 mg, Q4H PRN **OR** oxyCODONE (ROXICODONE) IR tablet 5 mg, Q4H PRN    pantoprazole (PROTONIX) EC tablet 40 mg, BID AC    Administrative Statements   Today, Patient Was Seen By: Mary Kay Aaron PA-C      **Please Note: This note may have been constructed using a voice recognition system.**

## 2024-12-27 NOTE — TELEPHONE ENCOUNTER
----- Message from Med Assistant Salas sent at 12/27/2024  2:00 PM CST -----    ----- Message -----  From: Rena Gee  Sent: 12/27/2024  11:56 AM CST  To: [facility] Staff    Type:  Needs Medical Advice    Who Called: Pt    Would the patient rather a call back or a response via [facility]? call  Best Call Back Number:  [phone]  Additional Information: Pt would like a call from nurse in office to discuss time she is avail for  Surgery on 01/08/2025 between 7 a.m and 8 a.m please call   Pt also states that she is currently in a lot of pain (lower back both sides right and left)  Pain medication currently not working

## 2024-12-27 NOTE — ED PROVIDER NOTES
Encounter Date: 12/27/2024       History     Chief Complaint   Patient presents with    Shortness of Breath     Patient reports shortness of breath that started earlier today. Patient also reports elevated blood pressure PTA, 228/93 in triage.     Patient is a 69-year-old female with a history of hypertension who says in spite of compliance with valsartan and amlodipine, that her blood pressures have been elevated.  She has developed shortness of breath today.  No headache or lightheadedness.  She denies chest pain.  Patient also says she has been urinating a lot today but has no dysuria or hematuria.      Review of patient's allergies indicates:   Allergen Reactions    Etanercept Other (See Comments)     Other reaction(s): recurrent infections    Chloramphenicol sod succinate Hives    Codeine Other (See Comments)     Other reaction(s): Stomach upset. Pt states OK with Percocet    Nickel sutures [surgical stainless steel] Dermatitis     Allergic contact dermatitis    Adhesive Rash     Past Medical History:   Diagnosis Date    Allergy     Amblyopia     Anemia     Anticoagulant long-term use     Arthritis 02/02/1992    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, estrogen receptor positive 04/13/2022    Cataract     Depression     Dry eyes     Dry mouth     Duane's syndrome of right eye     Early dry stage nonexudative age-related macular degeneration of both eyes 09/20/2022    Fibromyalgia 04/17/2014    Fibromyalgia     Fractured hip     RIGHT HIP    GERD (gastroesophageal reflux disease)     History of psychiatric hospitalization     Hyperlipidemia 02/02/1992    Hypertension     Hypocalcemia     Hyponatremia     Kidney stone     Migraine headache     Osteoporosis     Pressure ulcer of unspecified site, unspecified stage     Psoriatic arthritis 02/02/1992    Recurrent upper respiratory infection (URI)     Right knee pain     post knee replacement surgery (possible rejectiion of metal)    RLS (restless  legs syndrome)     Schizophrenia 1992    stable on meds    Sciatica     Squamous cell carcinoma of skin     Urinary tract infection      Past Surgical History:   Procedure Laterality Date    brow ptosis repair Right 2019    Surgeon: Dr. Karla Henson    CATARACT EXTRACTION      CAUDAL EPIDURAL STEROID INJECTION N/A 2024    Procedure: Caudal SOPHY;  Surgeon: Maile Storm DO;  Location: [facility] PAIN MANAGEMENT;  Service: Pain Management;  Laterality: N/A;  ASA ok     SECTION      COLONOSCOPY N/A 2016    Procedure: COLONOSCOPY;  Surgeon: ANDRIA Connelly MD;  Location: [facility] ([address] [place] [address]);  Service: Endoscopy;  Laterality: N/A;    COLONOSCOPY N/A 2022    Procedure: COLONOSCOPY;  Surgeon: Mikey Walters MD;  Location: [facility];  Service: Endoscopy;  Laterality: N/A;    cyst removed from right sinus  1982    EPIDURAL STEROID INJECTION INTO CERVICAL SPINE N/A 2023    Procedure: C6-7 SOPHY;  Surgeon: Spring Jensen MD;  Location: [facility] PAIN MANAGEMENT;  Service: Pain Management;  Laterality: N/A;  20 mins    EPIDURAL STEROID INJECTION INTO LUMBAR SPINE N/A 2023    Procedure: Injection-steroid-epidural-lumbar L5-S1;  Surgeon: Chirag Kim MD;  Location: [facility] PAIN MANAGEMENT;  Service: Pain Management;  Laterality: N/A;  asa    EPIDURAL STEROID INJECTION INTO LUMBAR SPINE N/A 2024    Procedure: L5/S1 Interlaminar SOPHY;  Surgeon: Maile Storm DO;  Location: [facility] PAIN MANAGEMENT;  Service: Pain Management;  Laterality: N/A;  20mins-ASA 5d    ESOPHAGOGASTRODUODENOSCOPY N/A 2023    Procedure: EGD (ESOPHAGOGASTRODUODENOSCOPY);  Surgeon: Dougie Shea MD;  Location: [facility];  Service: Endoscopy;  Laterality: N/A;    FOOT FRACTURE SURGERY      HYSTERECTOMY      VAGINAL HYSTERECTOMY WITHOUT BSO - ENDOMETRIOSIS    INJECTION FOR SENTINEL NODE IDENTIFICATION Right 2022    Procedure: INJECTION, FOR SENTINEL NODE IDENTIFICATION-Right;  Surgeon: NEAL Parks  MD Alejo;  Location: [facility] OR;  Service: General;  Laterality: Right;    INJECTION OF ANESTHETIC AGENT AROUND MEDIAL BRANCH NERVES INNERVATING LUMBAR FACET JOINT N/A 04/11/2019    Procedure: Lumbo-sacral Block, DR5 and Lateral Branches of S1,S2, S3;  Surgeon: Michelle Pineda Jr., MD;  Location: [facility] PAIN MGT;  Service: Pain Management;  Laterality: N/A;  Pt takes  and states she holds ASA on her own whenever she has procedures.  Instructed to hold x 3 days prior to procedure.      INJECTION OF ANESTHETIC AGENT AROUND MEDIAL BRANCH NERVES INNERVATING LUMBAR FACET JOINT Bilateral 05/03/2023    Procedure: Block-nerve-medial branch-lumbar bilateral L3, L4, L5;  Surgeon: Maile Storm DO;  Location: [facility] PAIN [facility];  Service: Pain Management;  Laterality: Bilateral;  asa    INJECTION OF ANESTHETIC AGENT INTO SACROILIAC JOINT Right 08/30/2018    Procedure: BLOCK, SACROILIAC JOINT-Right- ORAL SEDATION;  Surgeon: Michelle Pineda Jr., MD;  Location: [facility] PAIN [facility];  Service: Pain Management;  Laterality: Right;    INJECTION OF ANESTHETIC AGENT INTO SACROILIAC JOINT Bilateral 09/27/2018    Procedure: BLOCK, SACROILIAC JOINT-BILATERAL;  Surgeon: Michelle Pineda Jr., MD;  Location: [facility] PAIN [facility];  Service: Pain Management;  Laterality: Bilateral;  Please keep at 10:00 due to trasnsportation    INJECTION OF ANESTHETIC AGENT INTO SACROILIAC JOINT Bilateral 02/07/2019    Procedure: Bilateral Sacroiliac Joint Injection - Per Dr Pineda, not necessary to hold ASA.;  Surgeon: Michelle Pineda Jr., MD;  Location: [facility] PAIN MGT;  Service: Pain Management;  Laterality: Bilateral;    INJECTION, SPINE, LUMBOSACRAL, TRANSFORAMINAL APPROACH Right 7/17/2024    Procedure: Right  L3/4  and L4/5 TFESI;  Surgeon: Maile Storm DO;  Location: [facility] PAIN MANAGEMENT;  Service: Pain Management;  Laterality: Right;  20 mins  ASA 5 days    INTRAOCULAR PROSTHESES INSERTION Right 08/01/2019    Procedure: INSERTION, IOL PROSTHESIS;   Surgeon: Karen Song MD;  Location: [facility] OR [address] [place] [address];  Service: Ophthalmology;  Laterality: Right;    INTRAOCULAR PROSTHESES INSERTION Left 09/26/2019    Procedure: INSERTION, IOL PROSTHESIS;  Surgeon: Karen Song MD;  Location: [facility] OR [address] [place] [address];  Service: Ophthalmology;  Laterality: Left;    JOINT REPLACEMENT Right     knee    KNEE SURGERY      MASTECTOMY Right 05/09/2022    Procedure: MASTECTOMY-Right;  Surgeon: NEAL Dhillon MD;  Location: [facility];  Service: General;  Laterality: Right;  2.5 HOURS    PHACOEMULSIFICATION OF CATARACT Right 08/01/2019    Procedure: PHACOEMULSIFICATION, CATARACT;  Surgeon: Karen Song MD;  Location: [address];  Service: Ophthalmology;  Laterality: Right;    PHACOEMULSIFICATION OF CATARACT Left 09/26/2019    Procedure: PHACOEMULSIFICATION, CATARACT;  Surgeon: Karen Song MD;  Location: [address];  Service: Ophthalmology;  Laterality: Left;    RADIOFREQUENCY ABLATION, NERVE, PERIPHERAL Right 04/24/2024    Procedure: RIGHT L5 Dorsal Ramus and RIGHT Lateral Branches of S1, S2, and S3;  Surgeon: Maile Storm DO;  Location: [facility] PAIN MANAGEMENT;  Service: Pain Management;  Laterality: Right;  30 mins    RADIOFREQUENCY THERMOCOAGULATION Right 05/07/2019    Procedure: RADIOFREQUENCY THERMAL COAGULATION RIGHT DORSAL RAMUS 5 AND LATERAL BRANCH OF S1, S2 AND S3;  Surgeon: Michelle Pineda Jr., MD;  Location: [facility] PAIN [facility];  Service: Pain Management;  Laterality: Right;    RADIOFREQUENCY THERMOCOAGULATION Left 05/14/2019    Procedure: RADIOFREQUENCY THERMAL COAGULATION LEFT DORSAL RAMUS 5 AND LATERAL BRANCH OF S1,S2 AND S3;  Surgeon: Michelle Pineda Jr., MD;  Location: [facility] PAIN T;  Service: Pain Management;  Laterality: Left;    RADIOFREQUENCY THERMOCOAGULATION Right 10/22/2019    Procedure: RADIOFREQUENCY THERMAL COAGULATION---DARSAL RAMUS 5 and LATERAL S1,S2,and S3 Right;  Surgeon: Michelle Pineda Jr., MD;  Location: [facility] PAIN T;  Service:  Pain Management;  Laterality: Right;  patient to sign consent DOS    RADIOFREQUENCY THERMOCOAGULATION Left 10/29/2019    Procedure: RADIOFREQUENCY THERMAL COAGULATION - LEFT - DR5, S1,S2, AND S3;  Surgeon: Michelle Pineda Jr., MD;  Location: [facility];  Service: Pain Management;  Laterality: Left;    RADIOFREQUENCY THERMOCOAGULATION Left 05/26/2020    Procedure: RADIOFREQUENCY THERMAL COAGULATION--Left DR5+ lateral branches of S1, S2, S3;  Surgeon: Michelle Pineda Jr., MD;  Location: [facility];  Service: Pain Management;  Laterality: Left;    RADIOFREQUENCY THERMOCOAGULATION Right 06/02/2020    Procedure: RADIOFREQUENCY THERMAL COAGULATION--Right DR5+ lateral branches of S1, S2, S3;  Surgeon: Michelle Pineda Jr., MD;  Location: [facility];  Service: Pain Management;  Laterality: Right;    SENTINEL LYMPH NODE BIOPSY Right 05/09/2022    Procedure: BIOPSY, LYMPH NODE, SENTINEL-Right;  Surgeon: NEAL Dhillon MD;  Location: [facility];  Service: General;  Laterality: Right;    SINUS SURGERY      Surgery on right knee  07/09/1982    TONSILLECTOMY      tumor removed from back left side upper shoulder  03/17/2006     Family History   Problem Relation Name Age of Onset    Asthma Mother      Colon cancer Mother      Psoriasis Mother      Cancer Mother          colon    Depression Mother      Cancer Father          lymphoma    Stroke Sister      Diabetes Brother x2     Arthritis Brother x2     Heart disease Brother x2         cad    No Known Problems Daughter      Hypertension Neg Hx      Suicide Neg Hx      Amblyopia Neg Hx      Blindness Neg Hx      Cataracts Neg Hx      Glaucoma Neg Hx      Macular degeneration Neg Hx      Retinal detachment Neg Hx      Strabismus Neg Hx      Eczema Neg Hx      Allergies Neg Hx       Social History     Tobacco Use    Smoking status: Every Day     Current packs/day: 0.00     Average packs/day: 0.3 packs/day for 10.0 years (2.5 ttl pk-yrs)     Types: Cigarettes     Start date:  1/10/2013     Last attempt to quit: 1/10/2023     Years since quittin.9     Passive exposure: Never    Smokeless tobacco: Former    Tobacco comments:     about 5 cig a day   Substance Use Topics    Alcohol use: No    Drug use: No     Review of Systems   Constitutional:  Negative for fever.   Respiratory:  Positive for shortness of breath.    Cardiovascular:  Negative for chest pain.   Endocrine: Positive for polyuria.   All other systems reviewed and are negative.      Physical Exam     Initial Vitals [24 1406]   BP Pulse Resp Temp SpO2   (!) 228/93 71 (!) 30 97.9 °F (36.6 °C) 99 %      MAP       --         Physical Exam    Nursing note and vitals reviewed.  Constitutional: No distress.   HENT:   Head: Normocephalic and atraumatic.   Eyes: EOM are normal.   Pupils pinpoint bilaterally.   Neck: Neck supple.   Normal range of motion.  Cardiovascular:  Normal rate and regular rhythm.           Pulmonary/Chest: Breath sounds normal.   Abdominal: Abdomen is soft. There is no abdominal tenderness.   Musculoskeletal:         General: No edema. Normal range of motion.      Cervical back: Normal range of motion and neck supple.     Neurological: She is alert and oriented to person, place, and time.   Skin: Skin is warm and dry.   Psychiatric: She has a normal mood and affect. Thought content normal.         ED Course   Procedures  Labs Reviewed   URINALYSIS - Abnormal       Result Value    Specimen UA Urine, Clean Catch      Color, UA Colorless (*)     Appearance, UA Clear      pH, UA 8.0      Specific Gravity, UA 1.005      Protein, UA Negative      Glucose, UA Negative      Ketones, UA Negative      Bilirubin (UA) Negative      Occult Blood UA Negative      Nitrite, UA Negative      Urobilinogen, UA Negative      Leukocytes, UA Negative     CBC W/ AUTO DIFFERENTIAL - Abnormal    WBC 8.02      RBC 4.11      Hemoglobin 13.2      Hematocrit 39.8      MCV 97      MCH 32.1 (*)     MCHC 33.2      RDW 15.1 (*)      Platelets 386      MPV 8.4 (*)     Immature Granulocytes 0.4      Gran # (ANC) 5.5      Immature Grans (Abs) 0.03      Lymph # 1.5      Mono # 0.7      Eos # 0.2      Baso # 0.07      nRBC 0      Gran % 68.7      Lymph % 19.2      Mono % 8.7      Eosinophil % 2.1      Basophil % 0.9      Differential Method Automated     COMPREHENSIVE METABOLIC PANEL - Abnormal    Sodium 135 (*)     Potassium 4.0      Chloride 99      CO2 25      Glucose 94      BUN 9      Creatinine 0.8      Calcium 10.1      Total Protein 6.7      Albumin 3.9      Total Bilirubin 0.2      Alkaline Phosphatase 64      AST 17      ALT 10      eGFR >60      Anion Gap 11     TROPONIN I    Troponin I 0.009     B-TYPE NATRIURETIC PEPTIDE    BNP 54       EKG Readings: (Independently Interpreted)   Initial Reading: No STEMI. Rhythm: Normal Sinus Rhythm. Heart Rate: 79. Ectopy: PACs. ST Segments: Normal ST Segments.     ECG Results              EKG 12-lead (In process)        Collection Time Result Time QRS Duration OHS QTC Calculation    12/27/24 14:08:40 12/27/24 15:01:02 76 424                     In process by Interface, Lab In [facility] (12/27/24 15:01:12)                   Narrative:    Test Reason : R06.02,    Vent. Rate :  79 BPM     Atrial Rate :  79 BPM     P-R Int : 186 ms          QRS Dur :  76 ms      QT Int : 370 ms       P-R-T Axes :  67  13  78 degrees    QTcB Int : 424 ms    Sinus rhythm with Premature supraventricular complexes  Anteroseptal infarct (cited on or before 10-Dec-2018)  Abnormal ECG  When compared with ECG of 14-Aug-2024 08:51,  Premature supraventricular complexes are now Present    Referred By: AAAREFERRAL SELF           Confirmed By:                                   Imaging Results              X-Ray Chest 1 View (Final result)  Result time 12/27/24 15:21:36      Final result by John Bazan III, MD (12/27/24 15:21:36)                   Impression:      No acute process seen.      Electronically signed by: John  MD Beni  Date:    12/27/2024  Time:    15:21               Narrative:    EXAMINATION:  XR CHEST 1 VIEW    CLINICAL HISTORY:  Shortness of breath;    FINDINGS:  Heart size is normal.  There is DJD and aortic plaque.  Lungs are clear.  The bones are noncontributory.                                       Medications   amLODIPine tablet 5 mg (5 mg Oral Given 12/27/24 4727)     Medical Decision Making  Emergent evaluation of a 69-year-old female with hypertension who complains of elevated blood pressures at home in spite of compliance with valsartan and amlodipine.  She was given an extra dose of amlodipine here in the ED with eventual reduction of her systolic blood pressure from 228 to 175.  Lab work is reassuring and chest x-rays unremarkable.  She has good oxygen saturations on room air and is not tachycardic.  Therefore I have no suspicions of a pulmonary embolism.  I have suggested the patient increase her valsartan from 80 mg in the morning to 160 mg.  She will continue amlodipine.  Most importantly, she should follow up with the primary physician as soon as able for blood pressure recheck and further medication adjustment as warranted.  She may return to the emergency department for any further breathing difficulties, headache, dizziness, chest pain or any other urgent concerns.    Amount and/or Complexity of Data Reviewed  Labs: ordered.     Details: CBC is unremarkable.  CMP is unremarkable.  Troponin is 0.  BNP is 54.  Radiology: ordered.     Details: Chest x-ray without acute cardiopulmonary process.    Risk  Prescription drug management.      Additional MDM:     Well's Criteria Score:  -Clinical symptoms of DVT (leg swelling, pain with palpation) = 0.0  -PE is #1 diagnosis OR equally likely =            0.0  -Heart Rate >100 =   0.0  -Immobilization (= or > than 3 days) or surgery in the previous 4 weeks = 0.0  -Previous DVT/PE = 0.0  -Hemoptysis =          0.0  -Malignancy =           0.0  Well's  Probability Score =    0                                     Clinical Impression:  Final diagnoses:  [R06.02] Shortness of breath  [I10] Hypertension, unspecified type (Primary)          ED Disposition Condition    Discharge Stable          ED Prescriptions       Medication Sig Dispense Start Date End Date Auth. Provider    valsartan (DIOVAN) 160 MG tablet Take 1 tablet (160 mg total) by mouth once daily. 30 tablet 12/27/2024 -- Caden Madrid MD          Follow-up Information       Follow up With Specialties Details Why Contact Info    Sadaf Vargas MD Internal Medicine Schedule an appointment as soon as possible for a visit   [address]  [place] [address]  [phone]      [place] - Emergency Dept Emergency Medicine  As needed [address] [facility] [address] [phone]             Caden Madrid MD  12/27/24 4778

## 2024-12-30 ENCOUNTER — OFFICE VISIT (OUTPATIENT)
Dept: RHEUMATOLOGY | Facility: CLINIC | Age: 69
End: 2024-12-30
Payer: MEDICARE

## 2024-12-30 VITALS
HEIGHT: 62 IN | SYSTOLIC BLOOD PRESSURE: 146 MMHG | WEIGHT: 103.38 LBS | BODY MASS INDEX: 19.02 KG/M2 | DIASTOLIC BLOOD PRESSURE: 69 MMHG | HEART RATE: 8 BPM

## 2024-12-30 DIAGNOSIS — M54.42 LEFT-SIDED LOW BACK PAIN WITH LEFT-SIDED SCIATICA, UNSPECIFIED CHRONICITY: Primary | ICD-10-CM

## 2024-12-30 DIAGNOSIS — L40.50 PSORIATIC ARTHRITIS: ICD-10-CM

## 2024-12-30 LAB
OHS QRS DURATION: 78 MS
OHS QTC CALCULATION: 414 MS

## 2024-12-30 PROCEDURE — 3288F FALL RISK ASSESSMENT DOCD: CPT | Mod: CPTII,S$GLB,, | Performed by: INTERNAL MEDICINE

## 2024-12-30 PROCEDURE — 1125F AMNT PAIN NOTED PAIN PRSNT: CPT | Mod: CPTII,S$GLB,, | Performed by: INTERNAL MEDICINE

## 2024-12-30 PROCEDURE — 99999 PR PBB SHADOW E&M-EST. PATIENT-LVL IV: CPT | Mod: PBBFAC,,, | Performed by: INTERNAL MEDICINE

## 2024-12-30 PROCEDURE — 3008F BODY MASS INDEX DOCD: CPT | Mod: CPTII,S$GLB,, | Performed by: INTERNAL MEDICINE

## 2024-12-30 PROCEDURE — 4010F ACE/ARB THERAPY RXD/TAKEN: CPT | Mod: CPTII,S$GLB,, | Performed by: INTERNAL MEDICINE

## 2024-12-30 PROCEDURE — 3077F SYST BP >= 140 MM HG: CPT | Mod: CPTII,S$GLB,, | Performed by: INTERNAL MEDICINE

## 2024-12-30 PROCEDURE — 1101F PT FALLS ASSESS-DOCD LE1/YR: CPT | Mod: CPTII,S$GLB,, | Performed by: INTERNAL MEDICINE

## 2024-12-30 PROCEDURE — 99214 OFFICE O/P EST MOD 30 MIN: CPT | Mod: S$GLB,,, | Performed by: INTERNAL MEDICINE

## 2024-12-30 PROCEDURE — 1159F MED LIST DOCD IN RCRD: CPT | Mod: CPTII,S$GLB,, | Performed by: INTERNAL MEDICINE

## 2024-12-30 PROCEDURE — 3078F DIAST BP <80 MM HG: CPT | Mod: CPTII,S$GLB,, | Performed by: INTERNAL MEDICINE

## 2024-12-30 RX ORDER — METHOTREXATE 2.5 MG/1
20 TABLET ORAL
Qty: 96 TABLET | Refills: 0 | Status: SHIPPED | OUTPATIENT
Start: 2024-12-30

## 2024-12-30 RX ORDER — FOLIC ACID 1 MG/1
1000 TABLET ORAL DAILY
Qty: 90 TABLET | Refills: 3 | Status: SHIPPED | OUTPATIENT
Start: 2024-12-30

## 2024-12-30 NOTE — PROGRESS NOTES
Patient ID:  Bhavna Landry    YOB: 1955     MRN:  [id number]     Subjective:     Chief Complaint:  Disease Management     History of Present Illness:  Pt is a 69 y.o. female with psoriatic arthritis, psoriasis, osteoporosis, OA who presents today for f/u. LCV was 2/23/2024. At that time she reported feeling well overall and was not having joint pain, swelling, or psoriasis.    Today: Patient reports no new flares of her psoriasis but states she has felt some increased stiffness and swelling in her hands bilaterally (L>R). She reports persistent and worsening pain in her low back with radiation into her left buttock and left leg. She states the pain is worse with activity and is associated with burning and tingling that goes down her left leg. She is seen by Pain Management and states she is scheduled to have an injection on 1/8/2025. She states she is also taking tramadol, hydrocodone, and tylenol for her low back pain but that it is not helping. She endorses taking methotrexate 20 mg weekly with folic acid daily as well as Zometa infusions every 6 months (last infusion 11/6/2024). She also endorses some recurrent sinus infections and a recent ED visit for shortness of breath and elevated blood pressure, at which time her valsartan dose was increased.    Vaccines: UTD on Flu, COVID    Denies hair loss, dry eyes, vision changes, dry mouth, oral/nasal ulcers, trouble swallowing, new rashes, morning stiffness, or GI disturbances.      Review of Systems   Review of Systems   Constitutional:  Positive for fatigue and fever. Negative for chills.   HENT:  Positive for sinus pressure. Negative for mouth sores and trouble swallowing.    Eyes:  Negative for visual disturbance.   Respiratory:  Positive for cough and shortness of breath (improving).    Cardiovascular:  Negative for chest pain and leg swelling.   Gastrointestinal:  Positive for constipation. Negative for abdominal pain, diarrhea and nausea.    Genitourinary:  Negative for genital sores.   Musculoskeletal:  Positive for back pain and joint swelling.   Skin:  Positive for rash (chronic psoriasis on extensor surfaces).   Neurological:  Positive for headaches (last 2-3 hours). Negative for dizziness and light-headedness.   Hematological:  Bruises/bleeds easily.   Psychiatric/Behavioral:  Positive for sleep disturbance.         Current Medications:    Current Outpatient Medications:     albuterol (PROVENTIL) 2.5 mg /3 mL (0.083 %) nebulizer solution, Take 3 mLs (2.5 mg total) by nebulization every 6 (six) hours as needed for Wheezing. Rescue, Disp: 90 each, Rfl: 3    albuterol (PROVENTIL/VENTOLIN HFA) 90 mcg/actuation inhaler, USE 2 INHALATIONS BY MOUTH 4  TIMES DAILY AS NEEDED, Disp: 34 g, Rfl: 3    amLODIPine (NORVASC) 5 MG tablet, Take 1 tablet (5 mg total) by mouth 2 (two) times a day., Disp: 180 tablet, Rfl: 1    ascorbic acid, vitamin C, (VITAMIN C) 500 MG tablet, Take 500 mg by mouth once daily., Disp: , Rfl:     aspirin (ECOTRIN) 81 MG EC tablet, Take 81 mg by mouth once daily., Disp: , Rfl:     atorvastatin (LIPITOR) 80 MG tablet, Take 1 tablet (80 mg total) by mouth once daily., Disp: 90 tablet, Rfl: 3    azelastine (ASTELIN) 137 mcg (0.1 %) nasal spray, 1 spray (137 mcg total) by Nasal route 2 (two) times daily., Disp: 30 mL, Rfl: 0    baclofen (LIORESAL) 10 MG tablet, 1 tab po tid for muscle cramps., Disp: 60 tablet, Rfl: 1    benztropine (COGENTIN) 0.5 MG tablet, TAKE 1 TABLET(0.5 MG) BY MOUTH TWICE DAILY, Disp: 180 tablet, Rfl: 3    budesonide-formoterol 80-4.5 mcg (SYMBICORT) 80-4.5 mcg/actuation HFAA, INHALE 2 PUFFS BY MOUTH TWICE DAILY, Disp: 30.6 g, Rfl: 2    exemestane (AROMASIN) 25 mg tablet, Take 1 tablet (25 mg total) by mouth once daily., Disp: 30 tablet, Rfl: 11    fluticasone propionate (FLONASE) 50 mcg/actuation nasal spray, SHAKE LIQUID AND USE 1 SPRAY(50 MCG) IN EACH NOSTRIL DAILY, Disp: 32 g, Rfl: 2    HYDROcodone-acetaminophen  (NORCO) 5-325 mg per tablet, Take 1 tablet by mouth every 8 (eight) hours as needed for Pain., Disp: 90 tablet, Rfl: 0    immun glob G,IgG,-pro-IgA 0-50 (HIZENTRA) 4 gram/20 mL (20 %) Soln, Inject 60 mLs (12 g total) into the skin every 14 (fourteen) days., Disp: 120 mL, Rfl: 11    isosorbide mononitrate (IMDUR) 30 MG 24 hr tablet, Take 1 tablet (30 mg total) by mouth once daily., Disp: 90 tablet, Rfl: 3    levoFLOXacin (LEVAQUIN) 500 MG tablet, Take 1 tablet (500 mg total) by mouth once daily. for 7 days, Disp: 7 tablet, Rfl: 0    LIDOcaine (LIDODERM) 5 %, Place 1 patch onto the skin once daily. Remove & Discard patch within 12 hours or as directed by MD, Disp: 30 patch, Rfl: 0    meloxicam (MOBIC) 7.5 MG tablet, Take 1 tablet (7.5 mg total) by mouth once daily., Disp: 14 tablet, Rfl: 0    omega-3 fatty acids/fish oil (FISH OIL-OMEGA-3 FATTY ACIDS) 300-1,000 mg capsule, Take by mouth once daily., Disp: , Rfl:     omeprazole (PRILOSEC) 40 MG capsule, TAKE 1 CAPSULE(40 MG) BY MOUTH EVERY MORNING, Disp: 90 capsule, Rfl: 3    pregabalin (LYRICA) 25 MG capsule, TAKE 1 CAPSULE BY MOUTH EVERY EVENING, Disp: 30 capsule, Rfl: 2    pregabalin (LYRICA) 50 MG capsule, TAKE 1 CAPSULE EVERY EVENING, Disp: 30 capsule, Rfl: 2    risperiDONE (RISPERDAL) 2 MG tablet, TAKE 1 TABLET(2 MG) BY MOUTH EVERY MORNING, Disp: 90 tablet, Rfl: 3    risperiDONE (RISPERDAL) 4 MG tablet, TAKE 1 TABLET(4 MG) BY MOUTH EVERY EVENING, Disp: 90 tablet, Rfl: 3    traMADoL (ULTRAM) 50 mg tablet, Take 1 tablet (50 mg total) by mouth every 8 (eight) hours as needed for Pain., Disp: 90 each, Rfl: 0    valsartan (DIOVAN) 160 MG tablet, Take 1 tablet (160 mg total) by mouth once daily., Disp: 30 tablet, Rfl: 3    venlafaxine (EFFEXOR-XR) 75 MG 24 hr capsule, Take 1 capsule (75 mg total) by mouth once daily., Disp: 90 capsule, Rfl: 3    vitamin D (VITAMIN D3) 1000 units Tab, Take 1,000 Units by mouth once daily., Disp: , Rfl:     vitamin E 200 UNIT capsule,  Take 200 Units by mouth once daily., Disp: , Rfl:     zinc gluconate 50 mg tablet, Take 50 mg by mouth once daily., Disp: , Rfl:     folic acid (FOLVITE) 1 MG tablet, Take 1 tablet (1,000 mcg total) by mouth once daily., Disp: 90 tablet, Rfl: 3    methotrexate 2.5 MG Tab, Take 8 tablets (20 mg total) by mouth every 7 days. TAKE 4 TABLETS MONDAY MORNING AND TAKE 4 TABLETS MONDAY NIGHT ONLY, Disp: 96 tablet, Rfl: 0  No current facility-administered medications for this visit.    Facility-Administered Medications Ordered in Other Visits:     tropicamide 1% ophthalmic solution 1 drop, 1 drop, Right Eye, On Call Procedure, Karen Song MD, 1 drop at 08/01/19 0648     Objective:     Vitals:    12/30/24 1113   BP: (!) 146/69   Pulse: (!) 8      Body mass index is 18.91 kg/m².     Physical Exam   Constitutional:  Non-toxic appearance. No distress.   HENT:   Head: Normocephalic and atraumatic.   Right Ear: External ear normal.   Left Ear: External ear normal.   Mouth/Throat: Mucous membranes are moist. No oropharyngeal exudate. Oropharynx is clear.   Eyes: Conjunctivae are normal. Right eye exhibits no discharge. Left eye exhibits no discharge.   Cardiovascular: Normal rate, regular rhythm and normal heart sounds. Exam reveals no gallop and no friction rub.   No murmur heard.  Pulmonary/Chest: Effort normal and breath sounds normal. No stridor. No respiratory distress. She has no wheezes. She has no rhonchi. She has no rales.   Abdominal: Soft. She exhibits no distension. There is no abdominal tenderness.   Musculoskeletal:         General: Tenderness (left 2nd MCP, left elbow) present. Normal range of motion.      Right shoulder: Normal.      Left shoulder: Normal.      Right elbow: Normal.      Left elbow: Tenderness present.      Right wrist: Normal.      Left wrist: Normal.      Cervical back: Normal range of motion. No rigidity.      Right knee: Normal.      Left knee: Normal.      Right lower leg: No edema.       Left lower leg: No edema.   Lymphadenopathy:     She has no cervical adenopathy.   Neurological: She is alert. She displays no weakness.   Skin: Skin is warm and dry. Rash (scaly lesion right heel) noted.   Psychiatric: Mood normal.   Vitals reviewed.      Right Side Rheumatological Exam     Examination finds the shoulder, elbow, wrist, knee, 1st PIP, 1st MCP, 2nd PIP, 3rd PIP, 4th PIP, 4th MCP, 5th PIP and 5th MCP normal.    The patient has an enlarged 2nd MCP and 3rd MCP    Muscle Strength (0-5 scale):  Deltoid:  5  Biceps: 5/5   Triceps:  5  : 5/5   Iliopsoas: 5  Quadriceps:  5   Distal Lower Extremity: 5    Left Side Rheumatological Exam     Examination finds the shoulder, wrist, knee, 1st PIP, 1st MCP, 2nd PIP, 3rd PIP, 4th PIP, 4th MCP, 5th PIP and 5th MCP normal.    The patient is tender to palpation of the elbow and 2nd MCP.    The patient has an enlarged 2nd MCP and 3rd MCP.    Muscle Strength (0-5 scale):  Deltoid:  5  Biceps: 5/5   Triceps:  5  :  5/5   Iliopsoas: 5  Quadriceps:  5   Distal Lower Extremity: 5             1/12/2023 5/23/2023 8/22/2023 2/23/2024   Tender (CLARK-28) 10 / 28  0 / 28  0 / 28  0 / 28    Swollen (CLARK-28) 3 / 28  2 / 28  0 / 28  0 / 28    Provider Global 10 / 100 10 / 100 0 / 100 0 / 100   Patient Global 50 / 100 30 / 100 75 / 100 2 / 100   ESR 10 mm/hr 9 mm/hr 1 mm/hr 4 mm/hr   CRP 0.5 mg/L 0.3 mg/L 1.9 mg/L 0.8 mg/L   CLARK-28 (ESR) 4.57 (Moderate disease activity) 2.35 (Remission) 1.05 (Remission) 1 (Remission)   CLARK-28 (CRP) 4.06 (Moderate disease activity) 1.87 (Remission) 2.39 (Remission) 1.2 (Remission)   CDAI Score 19  6  7.5  0.2         There is currently no information documented on the homunculus. Go to the Rheumatology activity and complete the homunculus joint exam.      Assessment:     1. Left-sided low back pain with left-sided sciatica, unspecified chronicity    2. Psoriatic arthritis        Cervical demyelination with etanercept in past, no anti-TNF  Failed  secukinumab  Failed sulfasalazine  Did not feel tofacitinib helped and stopped after visit of 22 and not refilled( 22), but patient apparently has been getting automatic refills without refill approvals.   Guselkumab ordered 22 never filled        PsA  TJ  0 SJ 0 Pt global 15 ESR 4  CRP 1.4  DAS28  [id number]  CDAI remission)  DAPSA  TJ 0 SJ  0 Pt global  Pt pain  CRP  0.14 =   Osteoporosis DXA 3/18/24  FRAX major 12.5 % hip 4.6%, s/p hip fracture;received  Reclast zoledronic acid(5mg IV) 20, 3/17/22. Now on Zometa (zoledronic acid) (4mg IV) q 6 months for breast cancer from Dr. Marcelino  so no need for Reclast  most  recent dose 24. Not a candidate for anabolic Rx with breast cancer  Psoriasis, tiny lesions heels only  OA hands  Breast cancer on exemestane 25mg daily  and zoledronate 4mg IV q 6 months  /69  Cervical spondylosis, neck pain  Hypogammaglobulinemia now on Hizentra 12 g sc q 14 days   Body mass index is 18.91 kg/m².  Plan:      Problem List Items Addressed This Visit          Orthopedic    Low back pain - Primary    Relevant Orders    MRI Sacroiliac Joint W W/O Contrast     Other Visit Diagnoses       Psoriatic arthritis        Relevant Medications    folic acid (FOLVITE) 1 MG tablet    methotrexate 2.5 MG Tab    Other Relevant Orders    MRI Sacroiliac Joint W W/O Contrast              Continue f/u  Dr. Benítez Allergy Immunology today for hypogamaglobulinemia now on Hizentra 12 g sc q 14days  MRI sacroiliac joint w/wo contrast to rule out inflammatory cause of low back pain  Methotrexate to 20mg po every Monday, divided dose refill to [facility]  Folic acid 1mg daily refill to [facility]  Continue Zometa (zoledronate) 4mg q 6 months per Dr. Marcelino in Heme-Onc  No need to add guselkumab 100mg sc wk 0,4 then q 8 wks. If OK with Dr. Marcelino her oncologist   Standing labs q 3 months'  RTC 6 months     Mikey Shane M.D.  PGY-1  [employer] [facility]

## 2024-12-30 NOTE — PROGRESS NOTES
I have personally taken the history and examined the patient and agree with the resident,s note as stated above           Latest Reference Range & Units 12/27/24 14:54   WBC 3.90 - 12.70 K/uL 8.02   RBC 4.00 - 5.40 M/uL 4.11   Hemoglobin 12.0 - 16.0 g/dL 13.2   Hematocrit 37.0 - 48.5 % 39.8   MCV 82 - 98 fL 97   MCH 27.0 - 31.0 pg 32.1 (H)   MCHC 32.0 - 36.0 g/dL 33.2   RDW 11.5 - 14.5 % 15.1 (H)   Platelet Count 150 - 450 K/uL 386   MPV 9.2 - 12.9 fL 8.4 (L)   Gran % 38.0 - 73.0 % 68.7   Lymph % 18.0 - 48.0 % 19.2   Mono % 4.0 - 15.0 % 8.7   Eos % 0.0 - 8.0 % 2.1   Basophil % 0.0 - 1.9 % 0.9   Immature Granulocytes 0.0 - 0.5 % 0.4   Gran # (ANC) 1.8 - 7.7 K/uL 5.5   Lymph # 1.0 - 4.8 K/uL 1.5   Mono # 0.3 - 1.0 K/uL 0.7   Eos # 0.0 - 0.5 K/uL 0.2   Baso # 0.00 - 0.20 K/uL 0.07   Immature Grans (Abs) 0.00 - 0.04 K/uL 0.03   nRBC 0 /100 WBC 0   Differential Method  Automated   Sodium 136 - 145 mmol/L 135 (L)   Potassium 3.5 - 5.1 mmol/L 4.0   Chloride 95 - 110 mmol/L 99   CO2 23 - 29 mmol/L 25   Anion Gap 8 - 16 mmol/L 11   BUN 8 - 23 mg/dL 9   Creatinine 0.5 - 1.4 mg/dL 0.8   eGFR >60 mL/min/1.73 m^2 >60   Glucose 70 - 110 mg/dL 94   Calcium 8.7 - 10.5 mg/dL 10.1   ALP 40 - 150 U/L 64   PROTEIN TOTAL 6.0 - 8.4 g/dL 6.7   Albumin 3.5 - 5.2 g/dL 3.9   BILIRUBIN TOTAL 0.1 - 1.0 mg/dL 0.2   AST 10 - 40 U/L 17   ALT 10 - 44 U/L 10   BNP 0 - 99 pg/mL 54   Troponin I 0.000 - 0.026 ng/mL 0.009   (H): Data is abnormally high  (L): Data is abnormally low     Latest Reference Range & Units 12/23/24 11:54   Sed Rate 0 - 36 mm/Hr 4      Latest Reference Range & Units 12/23/24 11:54   CRP 0.0 - 8.2 mg/L 1.4      Latest Reference Range & Units 06/21/24 11:30   IgG 650 - 1600 mg/dL 383 (L)   IgM 50 - 300 mg/dL 18 (L)   IgA Level 40 - 350 mg/dL 125   (L): Data is abnormally low     Latest Reference Range & Units 07/23/24 07:33   Cholesterol Total 120 - 199 mg/dL 140   HDL 40 - 75 mg/dL 87 (H)   HDL/Cholesterol Ratio 20.0 - 50.0 %  62.1 (H)   Non-HDL Cholesterol mg/dL 53   Total Cholesterol/HDL Ratio 2.0 - 5.0  1.6 (L)   Triglycerides 30 - 150 mg/dL 46   LDL Cholesterol 63.0 - 159.0 mg/dL 43.8 (L)   (H): Data is abnormally high  (L): Data is abnormally low      Saw Dr. Benítez in Allergy-Immunology for CVID  9/27/24:    # CVID; History of recurrent sinusitis: She required antibiotics at least 4 times between 2/2023 and 2/2024. She was previously on sulfasalazine for her psoriatic arthritis, which can cause hypogam, but hasn't been on it since 2021 (and it isn't clear how long the hypogam effects would last). Low total IgG and IgM. She had previously received pneumovax 3 times and prevnar-13 once. Pneumococcal titers were low, didn't respond to prevnar-20 or repeat pneumovax. Lymphocyte flow cytometry with low CD19 B cells.  -again discussed option of Ig replacement therapy (both IV and SubQ). Patient thinks that if she did decide to move forward with it, she would prefer IVIg. But as she has not had any infections requiring antibiotics since 2/2024, she isn't sure if she is ready to proceed. Given lack of recent infections, reasonable to wait; however, if she were to start getting frequent sinusitis again or if she were to develop a more serious infection such as pneumonia, would recommend moving forward with Ig replacement therapy at that time.      * Update 9/30/24 - patient sent portal message saying she decided to move forward with Ig infusions, opting for the subcutaneous route. Ordered hizentra 12g SubQ q14 days (about 533 mg/kg/mo) to [facility].      # Rhinitis: Immunocaps negative for evidence of allergy. She reports flonase helps.  -continue flonase 1-2 SEN nightly.      # Wheezing and shortness of breath: Suspected COPD as patient is a long time smoker and previous chest imaging with emphysematous changes; however, she saw pulm recently, spirometry was without obstruction and more recent CT chest without  emphysematous changes. Total eos have been normal. She reports she has been using albuterol or symbicort every night for wheezing (usually without shortness of breath).  -given the frequency of symptoms, recommend she start using the symbicort 80 mcg as a controller (2 puffs BID, or at least 2 puffs every night).  -continue albuterol or symbicort as rescue.     # Cervical lymphadenopathy: Left sided. She reports it has been present for months.  -alerting her oncologist giving her history of breast cancer.        Follow up: 3-6 months      ASSESSMENT:    Cervical demyelination with etanercept in past, no anti-TNF  Failed secukinumab  Failed sulfasalazine  Did not feel tofacitinib helped and stopped after visit of 22 and not refilled( 22), but patient apparently has been getting automatic refills without refill approvals.   Guselkumab ordered 22 never filled        PsA  TJ  0 SJ 0 Pt global 80 ESR 4  CRP 1.4  DAS28 2.09  [id number]  2.4(both remission) CDAI 9(LDA)  DAPSA  TJ 0 SJ  0 Pt global 8  Pt pain  CRP  0.14 =   Osteoporosis DXA 3/18/24  FRAX major 12.5 % hip 4.6%, s/p hip fracture;received  Reclast zoledronic acid(5mg IV) 20, 3/17/22. Now on Zometa (zoledronic acid) (4mg IV) q 6 months for breast cancer from Dr. Marcelino  so no need for Reclast  most  recent dose 24. Not a candidate for anabolic Rx with breast cancer  Psoriasis, tiny lesions heels only  OA hands  Major issue is severe left lumbar radiculopathy largely in L5 distribution scheduled with Dr. Storm  for caudal SOPHY  Much less likely psoriatic spondylitis/sacroiliitis.   Breast cancer on exemestane 25mg daily  and zoledronate 4mg IV q 6 months  /69  Cervical spondylosis, neck pain  Hypogammaglobulinemia now on Hizentra 12 g sc q 14 days   Body mass index is 18.91 kg/m².      PLAN:      Caudal SOPHY by Dr. Storm 25 for left lumbar radiculopathy which is major issue at present  Have ordered MRI  SIJ to be sure  no psoriatic spondylitis/sacroiliitis contribution   Continue f/u  Dr. Benítez Allergy Immunology today for hypogamaglobulinemia now on Hizentra 12 g sc  q 14days  methotrexate to 20mg po every Monday, divided dose refill to [facility]  Folic acid 1mg daily refill to [facility]  Continue Zometa(zoledronate) 4mg q 6 months per Dr. Marcelino in Heme-Onc  No  need to add guselkumab 100mg sc wk 0,4 then q 8 wks. If OK with Dr. Marcelino her oncologist   Standing labs q 3 months'  RTC 6 months

## 2024-12-31 ENCOUNTER — TELEPHONE (OUTPATIENT)
Dept: RHEUMATOLOGY | Facility: CLINIC | Age: 69
End: 2024-12-31
Payer: MEDICARE

## 2024-12-31 ENCOUNTER — PATIENT MESSAGE (OUTPATIENT)
Dept: OTOLARYNGOLOGY | Facility: CLINIC | Age: 69
End: 2024-12-31
Payer: MEDICARE

## 2024-12-31 ENCOUNTER — PATIENT MESSAGE (OUTPATIENT)
Dept: INTERNAL MEDICINE | Facility: CLINIC | Age: 69
End: 2024-12-31
Payer: MEDICARE

## 2024-12-31 ENCOUNTER — TELEPHONE (OUTPATIENT)
Dept: PAIN MEDICINE | Facility: CLINIC | Age: 69
End: 2024-12-31
Payer: MEDICARE

## 2024-12-31 ENCOUNTER — HOSPITAL ENCOUNTER (OUTPATIENT)
Dept: RADIOLOGY | Facility: HOSPITAL | Age: 69
Discharge: HOME | End: 2024-12-31
Payer: MEDICARE

## 2024-12-31 DIAGNOSIS — J34.1 MUCOUS RETENTION CYST OF MAXILLARY SINUS: ICD-10-CM

## 2024-12-31 DIAGNOSIS — I65.29 OCCLUSION AND STENOSIS OF UNSPECIFIED CAROTID ARTERY: Primary | ICD-10-CM

## 2024-12-31 DIAGNOSIS — F41.9 ANXIETY: ICD-10-CM

## 2024-12-31 DIAGNOSIS — F33.42 RECURRENT MAJOR DEPRESSIVE DISORDER, IN FULL REMISSION: ICD-10-CM

## 2024-12-31 DIAGNOSIS — J32.9 CHRONIC SINUSITIS, UNSPECIFIED LOCATION: ICD-10-CM

## 2024-12-31 DIAGNOSIS — I65.23 BILATERAL CAROTID ARTERY OCCLUSION: Primary | ICD-10-CM

## 2024-12-31 DIAGNOSIS — R51.9 FACE PAIN: ICD-10-CM

## 2024-12-31 PROCEDURE — 70488 CT MAXILLOFACIAL W/O & W/DYE: CPT | Mod: TC

## 2024-12-31 PROCEDURE — 70488 CT MAXILLOFACIAL W/O & W/DYE: CPT | Mod: 26,,, | Performed by: RADIOLOGY

## 2024-12-31 PROCEDURE — 25500020 PHARM REV CODE 255

## 2024-12-31 RX ORDER — VENLAFAXINE HYDROCHLORIDE 75 MG/1
75 CAPSULE, EXTENDED RELEASE ORAL
Qty: 90 CAPSULE | Refills: 0 | Status: SHIPPED | OUTPATIENT
Start: 2024-12-31

## 2024-12-31 RX ADMIN — IOHEXOL 75 ML: 350 INJECTION, SOLUTION INTRAVENOUS at 02:12

## 2025-01-02 NOTE — TELEPHONE ENCOUNTER
Advised patient of Dr Goode's message, she voiced understanding. She will wait till March's appointment, but call for sooner appointment if needed.

## 2025-01-03 ENCOUNTER — HOSPITAL ENCOUNTER (OUTPATIENT)
Dept: CARDIOLOGY | Facility: HOSPITAL | Age: 70
Discharge: HOME | End: 2025-01-03
Attending: INTERNAL MEDICINE
Payer: MEDICARE

## 2025-01-03 ENCOUNTER — TELEPHONE (OUTPATIENT)
Dept: PAIN MEDICINE | Facility: CLINIC | Age: 70
End: 2025-01-03
Payer: MEDICARE

## 2025-01-03 DIAGNOSIS — I65.23 BILATERAL CAROTID ARTERY OCCLUSION: ICD-10-CM

## 2025-01-03 LAB
LEFT CBA DIAS: 17 CM/S
LEFT CBA SYS: 69 CM/S
LEFT CCA DIST DIAS: 22 CM/S
LEFT CCA DIST SYS: 77 CM/S
LEFT CCA PROX DIAS: 20 CM/S
LEFT CCA PROX SYS: 95 CM/S
LEFT ECA DIAS: 17 CM/S
LEFT ECA SYS: 151 CM/S
LEFT ICA DIST DIAS: 30 CM/S
LEFT ICA DIST SYS: 76 CM/S
LEFT ICA MID DIAS: 25 CM/S
LEFT ICA MID SYS: 84 CM/S
LEFT ICA PROX DIAS: 20 CM/S
LEFT ICA PROX SYS: 59 CM/S
LEFT VERTEBRAL DIAS: 10 CM/S
LEFT VERTEBRAL SYS: 44 CM/S
OHS CV CAROTID RIGHT ICA EDV HIGHEST: 30
OHS CV CAROTID ULTRASOUND LEFT ICA/CCA RATIO: 1.09
OHS CV CAROTID ULTRASOUND RIGHT ICA/CCA RATIO: 3.73
OHS CV PV CAROTID LEFT HIGHEST CCA: 95
OHS CV PV CAROTID LEFT HIGHEST ICA: 84
OHS CV PV CAROTID RIGHT HIGHEST CCA: 64
OHS CV PV CAROTID RIGHT HIGHEST ICA: 164
OHS CV US CAROTID LEFT HIGHEST EDV: 30
RIGHT CBA DIAS: 55 CM/S
RIGHT CBA SYS: 425 CM/S
RIGHT CCA DIST DIAS: 14 CM/S
RIGHT CCA DIST SYS: 44 CM/S
RIGHT CCA PROX DIAS: 13 CM/S
RIGHT CCA PROX SYS: 64 CM/S
RIGHT ECA DIAS: 29 CM/S
RIGHT ECA SYS: 341 CM/S
RIGHT ICA DIST DIAS: 24 CM/S
RIGHT ICA DIST SYS: 74 CM/S
RIGHT ICA MID DIAS: 26 CM/S
RIGHT ICA MID SYS: 84 CM/S
RIGHT ICA PROX DIAS: 30 CM/S
RIGHT ICA PROX SYS: 164 CM/S
RIGHT VERTEBRAL DIAS: 23 CM/S
RIGHT VERTEBRAL SYS: 76 CM/S

## 2025-01-03 PROCEDURE — 93880 EXTRACRANIAL BILAT STUDY: CPT

## 2025-01-03 PROCEDURE — 93880 EXTRACRANIAL BILAT STUDY: CPT | Mod: 26,,, | Performed by: INTERNAL MEDICINE

## 2025-01-08 ENCOUNTER — PATIENT MESSAGE (OUTPATIENT)
Dept: PSYCHIATRY | Facility: CLINIC | Age: 70
End: 2025-01-08
Payer: MEDICARE

## 2025-01-08 ENCOUNTER — TELEPHONE (OUTPATIENT)
Dept: INTERNAL MEDICINE | Facility: CLINIC | Age: 70
End: 2025-01-08
Payer: MEDICARE

## 2025-01-08 ENCOUNTER — PATIENT MESSAGE (OUTPATIENT)
Dept: INTERNAL MEDICINE | Facility: CLINIC | Age: 70
End: 2025-01-08
Payer: MEDICARE

## 2025-01-08 NOTE — TELEPHONE ENCOUNTER
----- Message from Garry sent at 1/8/2025  2:59 PM CST -----  Contact: [phone]  [email or web address]     Patient is calling for Medical Advice regarding: coughing and blowing out nose yellow/clear mucus    How long has patient had these symptoms: 3 days    Pharmacy name and phone#:   [id number] - [place] - [address] AT [facility] [address]  [address] [address]  Phone: [phone] Fax: [phone]    Patient wants a call back or thru [facility]:    Comments:    Please advise patient replies from provider may take up to 48 hours.

## 2025-01-09 ENCOUNTER — TELEPHONE (OUTPATIENT)
Dept: INTERNAL MEDICINE | Facility: CLINIC | Age: 70
End: 2025-01-09
Payer: MEDICARE

## 2025-01-09 DIAGNOSIS — I77.9 CAROTID ARTERY DISEASE, UNSPECIFIED LATERALITY, UNSPECIFIED TYPE: Primary | ICD-10-CM

## 2025-01-09 NOTE — TELEPHONE ENCOUNTER
Pls call - she has significant carotid plaque - will refer to Dr Cameron in cardiology.    Pls schedule appt asap    Continue aspirin and atorvastatin.

## 2025-01-09 NOTE — TELEPHONE ENCOUNTER
----- Message from Darryn Miller sent at 1/9/2025 11:06 AM CST -----  Contact: Bhavna     ----- Message -----  From: Karin Moyer  Sent: 1/7/2025   1:54 PM CST  To: Alicia CASTANEDA Staff    Bhavna would like a call back. She would like to get the results of her ultrasound

## 2025-01-10 ENCOUNTER — OFFICE VISIT (OUTPATIENT)
Dept: PODIATRY | Facility: CLINIC | Age: 70
End: 2025-01-10
Payer: MEDICARE

## 2025-01-10 VITALS
HEART RATE: 77 BPM | BODY MASS INDEX: 18.91 KG/M2 | HEIGHT: 62 IN | DIASTOLIC BLOOD PRESSURE: 75 MMHG | SYSTOLIC BLOOD PRESSURE: 136 MMHG

## 2025-01-10 DIAGNOSIS — M79.675 PAIN OF TOES OF BOTH FEET: ICD-10-CM

## 2025-01-10 DIAGNOSIS — B35.1 TINEA UNGUIUM: Primary | ICD-10-CM

## 2025-01-10 DIAGNOSIS — M79.674 PAIN OF TOES OF BOTH FEET: ICD-10-CM

## 2025-01-10 PROCEDURE — 99999 PR PBB SHADOW E&M-EST. PATIENT-LVL IV: CPT | Mod: PBBFAC,,, | Performed by: STUDENT IN AN ORGANIZED HEALTH CARE EDUCATION/TRAINING PROGRAM

## 2025-01-10 NOTE — PROCEDURES
"Returns for routine foot care for painful tinea unguium, both 1st toenails causing pain today as well as left 2nd; working on getting foot orthoses still, no new concerns    Routine Foot Care    Date/Time: 1/10/2025 11:15 AM    Performed by: Walt Zhang DPM  Authorized by: Walt Zhang DPM    Time out: Immediately prior to procedure a "time out" was called to verify the correct patient, procedure, equipment, support staff and site/side marked as required.    Consent Done?:  Yes (Verbal)  Hyperkeratotic Skin Lesions?: No      Nail Care Type:  Debride(Left 1st Toe, Right 1st Toe and Right 2nd Toe)  Patient tolerance:  Patient tolerated the procedure well with no immediate complications    RTC in 3 months for routine foot care  "

## 2025-01-13 ENCOUNTER — TELEPHONE (OUTPATIENT)
Dept: PAIN MEDICINE | Facility: CLINIC | Age: 70
End: 2025-01-13
Payer: MEDICARE

## 2025-01-13 ENCOUNTER — TELEPHONE (OUTPATIENT)
Dept: INTERNAL MEDICINE | Facility: CLINIC | Age: 70
End: 2025-01-13
Payer: MEDICARE

## 2025-01-13 NOTE — TELEPHONE ENCOUNTER
----- Message from Darryn Miller sent at 1/13/2025  4:33 PM CST -----  Contact: [phone]    ----- Message -----  From: Garry Fields  Sent: 1/13/2025   4:24 PM CST  To: Alicia CASTANEDA Staff    Type: Returning a call    Who left a message? nurse    When did the practice call? today    Does patient know what this is regarding: no    Would the patient rather a call back or a response via [facility]? call    Comments:

## 2025-01-13 NOTE — TELEPHONE ENCOUNTER
----- Message from Darryn Salas sent at 1/13/2025 10:40 AM CST -----    ----- Message -----  From: Patty Ayala  Sent: 1/13/2025  10:34 AM CST  To: Emeterio Gr Staff    Type:  Needs Medical Advice    Who Called: Pt   Symptoms (please be specific): questions regarding arrival date and time for procedure and also for antibiotics    Would the patient rather a call back or a [facility]? Call back   Best Call Back Number: [phone]  
Patient/Caregiver provided printed discharge information.

## 2025-01-14 ENCOUNTER — HOSPITAL ENCOUNTER (OUTPATIENT)
Dept: RADIOLOGY | Facility: HOSPITAL | Age: 70
Discharge: HOME | End: 2025-01-14
Payer: MEDICARE

## 2025-01-14 DIAGNOSIS — M54.42 LEFT-SIDED LOW BACK PAIN WITH LEFT-SIDED SCIATICA, UNSPECIFIED CHRONICITY: ICD-10-CM

## 2025-01-14 DIAGNOSIS — L40.50 PSORIATIC ARTHRITIS: ICD-10-CM

## 2025-01-14 PROCEDURE — A9585 GADOBUTROL INJECTION: HCPCS

## 2025-01-14 PROCEDURE — 25500020 PHARM REV CODE 255

## 2025-01-14 PROCEDURE — 72197 MRI PELVIS W/O & W/DYE: CPT | Mod: 26,,, | Performed by: RADIOLOGY

## 2025-01-14 PROCEDURE — 72197 MRI PELVIS W/O & W/DYE: CPT | Mod: TC

## 2025-01-14 RX ORDER — GADOBUTROL 604.72 MG/ML
7.5 INJECTION INTRAVENOUS
Status: COMPLETED | OUTPATIENT
Start: 2025-01-14 | End: 2025-01-14

## 2025-01-14 RX ADMIN — GADOBUTROL 7.5 ML: 604.72 INJECTION INTRAVENOUS at 06:01

## 2025-01-15 ENCOUNTER — OFFICE VISIT (OUTPATIENT)
Dept: CARDIOLOGY | Facility: CLINIC | Age: 70
End: 2025-01-15
Payer: MEDICARE

## 2025-01-15 VITALS
HEART RATE: 74 BPM | DIASTOLIC BLOOD PRESSURE: 61 MMHG | WEIGHT: 101.94 LBS | BODY MASS INDEX: 18.76 KG/M2 | SYSTOLIC BLOOD PRESSURE: 107 MMHG | OXYGEN SATURATION: 95 % | HEIGHT: 62 IN

## 2025-01-15 DIAGNOSIS — I10 ESSENTIAL HYPERTENSION: Primary | Chronic | ICD-10-CM

## 2025-01-15 DIAGNOSIS — I77.9 CAROTID ARTERY DISEASE, UNSPECIFIED LATERALITY, UNSPECIFIED TYPE: ICD-10-CM

## 2025-01-15 DIAGNOSIS — I48.0 PAF (PAROXYSMAL ATRIAL FIBRILLATION): ICD-10-CM

## 2025-01-15 DIAGNOSIS — I87.2 VENOUS INCOMPETENCE: ICD-10-CM

## 2025-01-15 PROCEDURE — 1101F PT FALLS ASSESS-DOCD LE1/YR: CPT | Mod: CPTII,S$GLB,, | Performed by: INTERNAL MEDICINE

## 2025-01-15 PROCEDURE — 4010F ACE/ARB THERAPY RXD/TAKEN: CPT | Mod: CPTII,S$GLB,, | Performed by: INTERNAL MEDICINE

## 2025-01-15 PROCEDURE — 3074F SYST BP LT 130 MM HG: CPT | Mod: CPTII,S$GLB,, | Performed by: INTERNAL MEDICINE

## 2025-01-15 PROCEDURE — 3078F DIAST BP <80 MM HG: CPT | Mod: CPTII,S$GLB,, | Performed by: INTERNAL MEDICINE

## 2025-01-15 PROCEDURE — 1125F AMNT PAIN NOTED PAIN PRSNT: CPT | Mod: CPTII,S$GLB,, | Performed by: INTERNAL MEDICINE

## 2025-01-15 PROCEDURE — 3288F FALL RISK ASSESSMENT DOCD: CPT | Mod: CPTII,S$GLB,, | Performed by: INTERNAL MEDICINE

## 2025-01-15 PROCEDURE — 99214 OFFICE O/P EST MOD 30 MIN: CPT | Mod: S$GLB,,, | Performed by: INTERNAL MEDICINE

## 2025-01-15 PROCEDURE — 1159F MED LIST DOCD IN RCRD: CPT | Mod: CPTII,S$GLB,, | Performed by: INTERNAL MEDICINE

## 2025-01-15 PROCEDURE — 99999 PR PBB SHADOW E&M-EST. PATIENT-LVL V: CPT | Mod: PBBFAC,,, | Performed by: INTERNAL MEDICINE

## 2025-01-15 PROCEDURE — G2211 COMPLEX E/M VISIT ADD ON: HCPCS | Mod: S$GLB,,, | Performed by: INTERNAL MEDICINE

## 2025-01-15 PROCEDURE — 93000 ELECTROCARDIOGRAM COMPLETE: CPT | Mod: S$GLB,,, | Performed by: INTERNAL MEDICINE

## 2025-01-15 PROCEDURE — 3008F BODY MASS INDEX DOCD: CPT | Mod: CPTII,S$GLB,, | Performed by: INTERNAL MEDICINE

## 2025-01-16 ENCOUNTER — TELEPHONE (OUTPATIENT)
Dept: CARDIOLOGY | Facility: CLINIC | Age: 70
End: 2025-01-16
Payer: MEDICARE

## 2025-01-16 ENCOUNTER — TELEPHONE (OUTPATIENT)
Dept: PODIATRY | Facility: CLINIC | Age: 70
End: 2025-01-16
Payer: MEDICARE

## 2025-01-16 ENCOUNTER — OFFICE VISIT (OUTPATIENT)
Dept: INTERNAL MEDICINE | Facility: CLINIC | Age: 70
End: 2025-01-16
Payer: MEDICARE

## 2025-01-16 DIAGNOSIS — I48.0 PAF (PAROXYSMAL ATRIAL FIBRILLATION): ICD-10-CM

## 2025-01-16 DIAGNOSIS — D83.9 COMMON VARIABLE IMMUNODEFICIENCY: ICD-10-CM

## 2025-01-16 DIAGNOSIS — L40.50 PSORIATIC ARTHRITIS: ICD-10-CM

## 2025-01-16 DIAGNOSIS — G89.4 CHRONIC PAIN SYNDROME: Primary | ICD-10-CM

## 2025-01-16 DIAGNOSIS — C77.3 SECONDARY AND UNSPECIFIED MALIGNANT NEOPLASM OF AXILLA AND UPPER LIMB LYMPH NODES: ICD-10-CM

## 2025-01-16 DIAGNOSIS — I77.9 BILATERAL CAROTID ARTERY DISEASE, UNSPECIFIED TYPE: ICD-10-CM

## 2025-01-16 DIAGNOSIS — F20.0 PARANOID SCHIZOPHRENIA: ICD-10-CM

## 2025-01-16 PROBLEM — J41.1 MUCOPURULENT CHRONIC BRONCHITIS: Status: RESOLVED | Noted: 2020-08-17 | Resolved: 2025-01-16

## 2025-01-16 LAB
OHS QRS DURATION: 80 MS
OHS QTC CALCULATION: 407 MS

## 2025-01-16 PROCEDURE — 4010F ACE/ARB THERAPY RXD/TAKEN: CPT | Mod: CPTII,95,, | Performed by: INTERNAL MEDICINE

## 2025-01-16 PROCEDURE — 98006 SYNCH AUDIO-VIDEO EST MOD 30: CPT | Mod: 95,,, | Performed by: INTERNAL MEDICINE

## 2025-01-16 PROCEDURE — G2211 COMPLEX E/M VISIT ADD ON: HCPCS | Mod: 95,,, | Performed by: INTERNAL MEDICINE

## 2025-01-16 NOTE — TELEPHONE ENCOUNTER
Pt is requesting call back to discuss inserts for arch support shoes, pt states that the left is hurts, pt does not remember who assisted, please call pt @ [phone]     Left voice message for patient to give our office a call back at [phone].

## 2025-01-16 NOTE — PROGRESS NOTES
The patient location is: [facility]  The chief complaint leading to consultation is: medication f/u    Visit type: audiovisual    Face to Face time with patient: 15  18 minutes of total time spent on the encounter, which includes face to face time and non-face to face time preparing to see the patient (eg, review of tests), Obtaining and/or reviewing separately obtained history, Documenting clinical information in the electronic or other health record, Independently interpreting results (not separately reported) and communicating results to the patient/family/caregiver, or Care coordination (not separately reported).         Each patient to whom he or she provides medical services by telemedicine is:  (1) informed of the relationship between the physician and patient and the respective role of any other health care provider with respect to management of the patient; and (2) notified that he or she may decline to receive medical services by telemedicine and may withdraw from such care at any time.    Notes:  Subjective     Patient ID: Bhavna Landry is a 69 y.o. female.    Chief Complaint: No chief complaint on file.    Back Pain  This is a recurrent problem. The pain radiates to the left thigh. The pain is severe. The pain is The same all the time. The symptoms are aggravated by position. Stiffness is present All day. Associated symptoms include abdominal pain and leg pain. Pertinent negatives include no bladder incontinence, bowel incontinence, chest pain, dysuria, fever, headaches, numbness, paresis, paresthesias, pelvic pain, perianal numbness, tingling, weakness or weight loss. The treatment provided significant relief.     Medication f/u   Sinusitis has improved.      She has had several sinus infections.  Recent CT: mucous retention cyst.        Cardiology eval yday for carotid artery disease also reviewed.      Sinus CT suggested greater than 50% right proximal ICA stenosis      Subsequent US:   Severe greater than  70% stenosis of the right carotid bulb extending into the right proximal internal carotid artery    Calcified nodules noted in the left internal carotid artery associated with less than 50% stenosis.    She was referred to vasc surg.      She is in constant pain.  Hydrocodone is not helpful.         She has stopped hydrocodone.         Recent MRI reviewed                         Advanced left-sided degenerative disc disease in the lower lumbar spine. - she has upcoming appt with Pain Medicine.              Review of Systems   Constitutional:  Negative for fever and weight loss.   Cardiovascular:  Negative for chest pain.   Gastrointestinal:  Positive for abdominal pain. Negative for bowel incontinence.   Genitourinary:  Negative for bladder incontinence, dysuria, hematuria and pelvic pain.   Musculoskeletal:  Positive for back pain and leg pain.   Neurological:  Negative for tingling, weakness, numbness, headaches and paresthesias.          Objective     Physical Exam  Constitutional:       General: She is not in acute distress.     Appearance: Normal appearance. She is normal weight. She is not ill-appearing, toxic-appearing or diaphoretic.   Neurological:      Mental Status: She is alert.   Psychiatric:         Mood and Affect: Mood normal.         Behavior: Behavior normal.         Thought Content: Thought content normal.            Assessment and Plan     1. Chronic pain syndrome    2. Bilateral carotid artery disease, unspecified type    3. Secondary and unspecified malignant neoplasm of axilla and upper limb lymph nodes  Assessment & Plan:  Managed by oncology      4. Psoriatic arthritis  Assessment & Plan:  Managed by Dr Cornelius.  Meds reviewed.      5. Common variable immunodeficiency  Assessment & Plan:  Managed by Allergy  Unable to afford immunoglobulin tx.      6. Paranoid schizophrenia  Assessment & Plan:  Managed by psych.  Meds reviewed.      7. PAF (paroxysmal atrial fibrillation)  Assessment &  Plan:  Managed by cardiology          ENT, Vasc Surg eval upcoming  F/u Pain Med         No follow-ups on file.

## 2025-01-16 NOTE — TELEPHONE ENCOUNTER
----- Message from Alexandre Aly MD sent at 1/16/2025  9:43 AM CST -----  Can you please let the patient know that I have talked to the vascular surgeon.  No need for additional CT scan.  He can look at everything on the sinus CT that was done earlier

## 2025-01-17 ENCOUNTER — PATIENT MESSAGE (OUTPATIENT)
Dept: INFECTIOUS DISEASES | Facility: HOSPITAL | Age: 70
End: 2025-01-17
Payer: MEDICARE

## 2025-01-17 ENCOUNTER — PATIENT MESSAGE (OUTPATIENT)
Dept: PSYCHIATRY | Facility: CLINIC | Age: 70
End: 2025-01-17
Payer: MEDICARE

## 2025-01-22 ENCOUNTER — OFFICE VISIT (OUTPATIENT)
Dept: PSYCHIATRY | Facility: CLINIC | Age: 70
End: 2025-01-22
Payer: COMMERCIAL

## 2025-01-22 DIAGNOSIS — E87.1 HYPONATREMIA: ICD-10-CM

## 2025-01-22 DIAGNOSIS — G47.00 INSOMNIA, UNSPECIFIED TYPE: ICD-10-CM

## 2025-01-22 DIAGNOSIS — G25.81 RESTLESS LEGS: ICD-10-CM

## 2025-01-22 DIAGNOSIS — F33.0 MILD EPISODE OF RECURRENT MAJOR DEPRESSIVE DISORDER: ICD-10-CM

## 2025-01-22 DIAGNOSIS — G31.84 MILD COGNITIVE IMPAIRMENT WITH MEMORY LOSS: ICD-10-CM

## 2025-01-22 DIAGNOSIS — F20.0 PARANOID SCHIZOPHRENIA: ICD-10-CM

## 2025-01-22 DIAGNOSIS — R29.818 EXTRAPYRAMIDAL SYMPTOM: ICD-10-CM

## 2025-01-22 DIAGNOSIS — F41.1 GAD (GENERALIZED ANXIETY DISORDER): Primary | ICD-10-CM

## 2025-01-22 RX ORDER — BENZTROPINE MESYLATE 0.5 MG/1
0.5 TABLET ORAL 2 TIMES DAILY
Qty: 180 TABLET | Refills: 3 | Status: SHIPPED | OUTPATIENT
Start: 2025-01-22

## 2025-01-22 RX ORDER — RISPERIDONE 2 MG/1
TABLET ORAL
Qty: 90 TABLET | Refills: 3 | Status: SHIPPED | OUTPATIENT
Start: 2025-01-22

## 2025-01-22 RX ORDER — RISPERIDONE 4 MG/1
4 TABLET ORAL NIGHTLY
Qty: 90 TABLET | Refills: 3 | Status: SHIPPED | OUTPATIENT
Start: 2025-01-22

## 2025-01-22 RX ORDER — VENLAFAXINE HYDROCHLORIDE 37.5 MG/1
37.5 CAPSULE, EXTENDED RELEASE ORAL DAILY
Qty: 90 CAPSULE | Refills: 3 | Status: SHIPPED | OUTPATIENT
Start: 2025-01-22 | End: 2026-01-22

## 2025-01-22 RX ORDER — VENLAFAXINE HYDROCHLORIDE 75 MG/1
75 CAPSULE, EXTENDED RELEASE ORAL DAILY
Qty: 90 CAPSULE | Refills: 3 | Status: SHIPPED | OUTPATIENT
Start: 2025-01-22
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
## 2025-01-24 ENCOUNTER — TELEPHONE (OUTPATIENT)
Dept: PODIATRY | Facility: CLINIC | Age: 70
End: 2025-01-24
Payer: MEDICARE

## 2025-01-24 ENCOUNTER — TELEPHONE (OUTPATIENT)
Dept: PAIN MEDICINE | Facility: CLINIC | Age: 70
End: 2025-01-24
Payer: MEDICARE

## 2025-01-24 NOTE — TELEPHONE ENCOUNTER
I called patient she wants to get fitted for arch support she will go to the 5th floor on 1/31 as she was told for fitting.

## 2025-01-27 ENCOUNTER — TELEPHONE (OUTPATIENT)
Dept: PAIN MEDICINE | Facility: CLINIC | Age: 70
End: 2025-01-27
Payer: MEDICARE

## 2025-01-27 NOTE — TELEPHONE ENCOUNTER
----- Message from Med Assistant Salas sent at 1/27/2025  2:28 PM CST -----    ----- Message -----  From: Margaret Blakely  Sent: 1/27/2025   2:24 PM CST  To: [facility] Staff    Type:  Pt Advice     Who Called: Pt   Does the patient know what this is regarding?: pt takes 1 baby asprin a day, she has 75% blockage in carotid artery and upcoming procedure on Wednesday 01/29 , does pt need to stop taking asprin for procedure?  Would the patient rather a call back or a response via [facility]? Call   Best Call Back Number:[phone]   Additional Information:

## 2025-01-27 NOTE — PRE-PROCEDURE INSTRUCTIONS
The following was discussed with pt via phone and sent to pt portal. Pt verbalized understanding. Pt to be accompanied by her daughter Vidhi Haywood.    Dear Bhavna,       Please read over the following pre-procedure instructions in it's entirety as there is helpful information here to get you well prepared for your upcoming procedure.     You are scheduled for a procedure with Dr. Storm on 1/29/2025.     [facility] is located at the corner of [facility] and [facility]. [facility] [place] [facility] to [facility]. The address is: [address] [place] [address]. Take the elevator to the 2nd floor.      Registration check in time: 7:00am  Scheduled procedure time: 8:15am     You are scheduled to receive: _______Oral sedation         ____X___IV Sedation         _______No Sedation             If you are receiving any sedation, you CANNOT drive yourself and must have a responsible friend or family member (no rideshare) to drive you home.     You should take any medications that you routinely take for blood pressure, heart medications, thyroid, cholesterol, etc.      The fasting restrictions are dependent on whether or not you are receiving sedation. Sedation is not available for all procedures.      Your fasting instructions for sedation patients are as follow:  Oral sedation: You may eat and drink as normal. You may take your morning meds as prescribed, except for fluid pills.    IV sedation. Nothing to eat after midnight the night prior to procedure. Patients are encouraged to consume clear liquids up to 2 hours prior to scheduled arrival time. -Clear liquids include Gatorade, water, soda, black coffee or tea (no milk or creamer), and clear juices. - Clear liquids do NOT include anything with pulp or food particles (chicken broth, ice cream, yogurt, Jello, etc.) You CANNOT drive yourself and must have a .            If you are on blood thinners, you need to follow the anticoagulation  instructions that had been discussed previously. You should only stop the blood thinners if it was approved by your primary care physician or your cardiologist. In the event that you are not able to stop your blood thinners, a blood thinner was not listed on your medication list, or we were not able to get clearance from your cardiologist, then the procedure may have to be postponed/canceled.      IF you were told to stop your blood thinners, this is how long you should generally hold some of the more common ones. Remember that stopping blood thinners is only necessary for certain procedures. If you are unsure of your instructions, please call us.   Aspirin - 5 days  Plavix/Clopidogrel - 7 days  Warfarin / Coumadin - 5 days  Eliquis - 3 days  Pradaxa/Dabigatran - 4 days  Xarelto/Rivaroxaban - 3 days        HOLD all non-insulin injections (shots) until after surgery (Ozempic, Mounjaro, Trulicity, Victoza, Byetta, Wegovy and Adlyxin) (Total of 7 days prior)        If you are a diabetic, do not take your medication if you will be fasting, but bring it with you. Please plan on being here for roughly 2-3 hours. Please note that most procedures will not be performed if you blodd sugar is >200.     Please call us if you have been sick (running fever, having any flu-like symptoms) or have been taking ANTIBIOTICS in the past 2 weeks or had any outpatient procedures other than with us (colonoscopy, endoscopy, OBGYN, dental, etc.).      If you have been previously COVID positive, you will need to hold off on your procedure until you are symptom free for 10 days. If you did not have any symptoms, you can have your procedure 10 days from your positive test result.         On the morning of your procedure:  *HOLD ALL VITAMINS, MINERALS, HERBS (INCLUDING HERBAL TEAS) AND SUPPLEMENTS  *SHOWER WITH ANTIBACTERIAL SOAP (EX. DIAL) NIGHT BEFORE AND MORNING OF PROCEDURE  *DO NOT APPLY ANY LOTIONS, OILS, POWDERS, PERFUME/COLOGNE,  OINTMENTS, GELS, CREAMS, MAKEUP OR DEODORANT TO YOUR SKIN MORNING OF PROCEDURE  *LEAVE JEWELRY AND ANY VALUABLES AT HOME  *WEAR LOOSE COMFORTABLE CLOTHING         Please reply to this portal message as receipt of delivery.     Thank you,  [facility]  [facility]  Pre-Admit

## 2025-01-29 ENCOUNTER — HOSPITAL ENCOUNTER (OUTPATIENT)
Facility: HOSPITAL | Age: 70
Discharge: HOME | End: 2025-01-29
Attending: STUDENT IN AN ORGANIZED HEALTH CARE EDUCATION/TRAINING PROGRAM | Admitting: STUDENT IN AN ORGANIZED HEALTH CARE EDUCATION/TRAINING PROGRAM
Payer: MEDICARE

## 2025-01-29 ENCOUNTER — TELEPHONE (OUTPATIENT)
Dept: NEUROSURGERY | Facility: CLINIC | Age: 70
End: 2025-01-29
Payer: MEDICARE

## 2025-01-29 VITALS
HEART RATE: 79 BPM | SYSTOLIC BLOOD PRESSURE: 102 MMHG | OXYGEN SATURATION: 95 % | TEMPERATURE: 98 F | BODY MASS INDEX: 19.07 KG/M2 | RESPIRATION RATE: 16 BRPM | WEIGHT: 101 LBS | DIASTOLIC BLOOD PRESSURE: 52 MMHG | HEIGHT: 61 IN

## 2025-01-29 DIAGNOSIS — G89.29 CHRONIC PAIN: ICD-10-CM

## 2025-01-29 DIAGNOSIS — M54.17 LUMBOSACRAL RADICULOPATHY: Primary | ICD-10-CM

## 2025-01-29 PROCEDURE — 25500020 PHARM REV CODE 255: Performed by: STUDENT IN AN ORGANIZED HEALTH CARE EDUCATION/TRAINING PROGRAM

## 2025-01-29 PROCEDURE — 99152 MOD SED SAME PHYS/QHP 5/>YRS: CPT | Performed by: STUDENT IN AN ORGANIZED HEALTH CARE EDUCATION/TRAINING PROGRAM

## 2025-01-29 PROCEDURE — 62323 NJX INTERLAMINAR LMBR/SAC: CPT | Performed by: STUDENT IN AN ORGANIZED HEALTH CARE EDUCATION/TRAINING PROGRAM

## 2025-01-29 PROCEDURE — 63600175 PHARM REV CODE 636 W HCPCS: Performed by: STUDENT IN AN ORGANIZED HEALTH CARE EDUCATION/TRAINING PROGRAM

## 2025-01-29 PROCEDURE — 62323 NJX INTERLAMINAR LMBR/SAC: CPT | Mod: ,,, | Performed by: STUDENT IN AN ORGANIZED HEALTH CARE EDUCATION/TRAINING PROGRAM

## 2025-01-29 RX ORDER — SODIUM CHLORIDE 9 MG/ML
500 INJECTION, SOLUTION INTRAVENOUS CONTINUOUS
Status: DISCONTINUED | OUTPATIENT
Start: 2025-01-29 | End: 2025-01-29 | Stop reason: HOSPADM

## 2025-01-29 RX ORDER — LIDOCAINE HYDROCHLORIDE 10 MG/ML
1 INJECTION, SOLUTION EPIDURAL; INFILTRATION; INTRACAUDAL; PERINEURAL ONCE
Status: DISCONTINUED | OUTPATIENT
Start: 2025-01-29 | End: 2025-01-29 | Stop reason: HOSPADM

## 2025-01-29 RX ORDER — MIDAZOLAM HYDROCHLORIDE 1 MG/ML
INJECTION INTRAMUSCULAR; INTRAVENOUS
Status: DISCONTINUED | OUTPATIENT
Start: 2025-01-29 | End: 2025-01-29 | Stop reason: HOSPADM

## 2025-01-29 RX ORDER — FENTANYL CITRATE 50 UG/ML
INJECTION, SOLUTION INTRAMUSCULAR; INTRAVENOUS
Status: DISCONTINUED | OUTPATIENT
Start: 2025-01-29 | End: 2025-01-29 | Stop reason: HOSPADM

## 2025-01-29 RX ORDER — DEXAMETHASONE SODIUM PHOSPHATE 10 MG/ML
INJECTION, SOLUTION INTRA-ARTICULAR; INTRALESIONAL; INTRAMUSCULAR; INTRAVENOUS; SOFT TISSUE
Status: DISCONTINUED | OUTPATIENT
Start: 2025-01-29 | End: 2025-01-29 | Stop reason: HOSPADM

## 2025-01-29 RX ORDER — LIDOCAINE HYDROCHLORIDE 20 MG/ML
INJECTION, SOLUTION EPIDURAL; INFILTRATION; INTRACAUDAL; PERINEURAL
Status: DISCONTINUED | OUTPATIENT
Start: 2025-01-29 | End: 2025-01-29 | Stop reason: HOSPADM

## 2025-01-29 RX ORDER — LIDOCAINE HYDROCHLORIDE 10 MG/ML
INJECTION, SOLUTION INFILTRATION; PERINEURAL
Status: DISCONTINUED | OUTPATIENT
Start: 2025-01-29 | End: 2025-01-29 | Stop reason: HOSPADM

## 2025-01-29 NOTE — DISCHARGE INSTRUCTIONS
[facility]  Dr. Maile Storm  Messaging service # [phone]    POST-PROCEDURE INSTRUCTIONS:    Today you had an injection that included a steroid medications.  The steroid may or may not have been mixed with a local anesthetic when it was injected.   If the injection was in the neck, you may feel some pressure, numbness, or slight weakness in the arm after the procedure for a short period of time (this is a normal response), if this persists for longer than 1 day please contact our office or go to the emergency room.  If the injection was in the low back, you may feel some pressure, numbness, or slight weakness in the leg after the procedure for a short period of time (this is a normal response), if this persists for longer than 1 day please contact our office or go to the emergency room.  You may get side effects from the steroid.  This is not uncommon.  Symptoms include: elevated blood sugar, elevated blood pressure, headache, flushing, nausea, insomnia.  These symptoms are transient and will resolve within 1-3 days.  If symptoms last longer than this please contact our office or head to the emergency room.  Steroid medications can take anywhere from 3-14 days to take effect (rarely longer).  You may notice that your pain worsens for a short period of time after the injection, this would not be unusual due to the pressure and trauma from the needle.    If you do not have a follow up appointment scheduled, please contact my office (or the office of the physician who referred you for the procedure) to get a post-procedure follow up scheduled 2-4 weeks after the procedure.  This can be done as a virtual visit if that is more convenient for you.      What you need to do:    Keep a record of your response to the injection you had today.    How much relief did you get?   When did the relief start and how long did it last?  Were you able to decrease the use of any of your pain  medications?  Were you able to increase your level of activity?  How long did the relief last?    What to watch out for:    If you experience any of the following symptoms after your procedure, please notify the messaging service immediately (see above for contact information):   fever (increased oral temperature)   bleeding or swelling at the injection site,    drainage, rash or redness at the injection site    possible signs of infection    increased pain at the injection site   worsening of your usual pain   severe headache   new or worsening numbness    new arm and/or leg weakness, or    changes in bowel and/or bladder function: urinating or defecating on yourself and not knowing that you did it.    PLEASE FOLLOW ALL INSTRUCTIONS CAREFULLY     Do not engage in strenuous activity (e.g., lifting or pushing heavy objects or repeated bending) for 24 hours.     Do not take a bath, swim or use Jacuzzi for 24 hours after procedure. (A shower is fine).   Remove any Band-Aids when you get home.    Use cold/ice, as needed for comfort.  We recommend the use of cold therapy alternating on for 20 minutes, off for 20 minutes.    Do not apply direct heat (heating pad or heat packs) to the injection site for 24 hours.     Resume your usual medications, unless instructed otherwise by your Pain Physician.     If you are on warfarin (Coumadin) or other blood thinner, resume this medication as instructed by your prescribing Physician.    IF AT ANY POINT YOU ARE VERY CONCERNED ABOUT YOUR SYMPTOMS, PLEASE GO TO THE EMERGENCY ROOM.    If you develop worsening pain, weakness, numbness, lose bowel or bladder control (i.e., having an accident where you did not even know you had to go to the bathroom and suddenly noticed you soiled yourself), saddle anesthesia (a loss of sensation restricted to the area of the buttocks, anus and between the legs -- i.e., those parts of your body that would touch a saddle if you were sitting on one) you  need to go immediately to the emergency department for evaluation and treatment.    ----------------------------------------------------------------------------------------------------------------------------------------------------------------  If you received Sedation please read the following instructions:  POST SEDATION INSTRUCTIONS    Today you received intravenous medication (also known as sedation) that was used to help you relax and/or decrease discomfort during your procedure. This medication will be acting in your body for the next 24 hours, so you might feel a little tired or sleepy. This feeling will slowly wear off.   Common side effects associated with these medications include: drowsiness, dizziness, sleepiness, confusion, feeling excited, difficulty remembering things, lack of steadiness with walking or balance, loss of fine muscle control, slowed reflexes, difficulty focusing, and blurred vision.  Some over-the-counter and prescription medications (e.g., muscle relaxants, opioids, mood-altering medications, sedatives/hypnotics, antihistamines) can interact with the intravenous medication you received and cause an increased risk of the side effects listed above in addition to other potentially life threatening side effects. Use extreme caution if you are taking such medications, and consult with your Pain Physician or prescribing physician if you have any questions.  For the next 12-24 hours:    DO NOT--Drive a car, operate machinery or power tools   DO NOT--Drink any alcoholic beverages (not even beer), they may dangerously increase the risk of side effects.    DO NOT--Make any important legal or business decisions or sign important documents.  We advise you to have someone to assist you at home. Move slowly and carefully. Do not make sudden changes in position. Be aware of dizziness or light-headedness and move accordingly.   If you seek medical treatment within 24 hours, let the nurse or doctor  caring for you know that you have received the above medications. If you have any questions or concerns related to your sedation or treatment today please contact us.

## 2025-01-29 NOTE — PLAN OF CARE
Bhavna Landry has met all discharge criteria from Phase II. Vital Signs are stable, ambulating  without difficulty. Discharge instructions given, patient verbalized understanding. Discharged from facility via wheelchair in stable condition.

## 2025-01-29 NOTE — DISCHARGE SUMMARY
Discharge Note  Short Stay      SUMMARY     Admit Date: 1/29/2025    Attending Physician: Maile Storm      Discharge Physician: Maile Storm      Discharge Date: 1/29/2025 8:27 AM    Procedure(s) (LRB):  Caudal SOPHY (N/A)    Final Diagnosis: Lumbosacral radiculopathy [M54.17]    Disposition: Home or self care    Patient Instructions:   Current Discharge Medication List        CONTINUE these medications which have NOT CHANGED    Details   albuterol (PROVENTIL) 2.5 mg /3 mL (0.083 %) nebulizer solution Take 3 mLs (2.5 mg total) by nebulization every 6 (six) hours as needed for Wheezing. Rescue  Qty: 90 each, Refills: 3    Associated Diagnoses: Chronic bronchitis, unspecified chronic bronchitis type      albuterol (PROVENTIL/VENTOLIN HFA) 90 mcg/actuation inhaler USE 2 INHALATIONS BY MOUTH 4  TIMES DAILY AS NEEDED  Qty: 34 g, Refills: 3    Comments: Please send a replace/new response with 90-Day Supply if appropriate to maximize member benefit. Requesting 1 year supply.      amLODIPine (NORVASC) 5 MG tablet Take 1 tablet (5 mg total) by mouth 2 (two) times a day.  Qty: 180 tablet, Refills: 1    Comments: .  Associated Diagnoses: Essential hypertension      aspirin (ECOTRIN) 81 MG EC tablet Take 81 mg by mouth once daily.      atorvastatin (LIPITOR) 80 MG tablet Take 1 tablet (80 mg total) by mouth once daily.  Qty: 90 tablet, Refills: 3    Associated Diagnoses: Hyperlipidemia, unspecified hyperlipidemia type      benztropine (COGENTIN) 0.5 MG tablet Take 1 tablet (0.5 mg total) by mouth 2 (two) times daily.  Qty: 180 tablet, Refills: 3    Associated Diagnoses: Extrapyramidal symptom      exemestane (AROMASIN) 25 mg tablet Take 1 tablet (25 mg total) by mouth once daily.  Qty: 30 tablet, Refills: 11    Associated Diagnoses: 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, estrogen receptor positive      HYDROcodone-acetaminophen (NORCO) 5-325 mg per tablet Take 1 tablet by mouth every 8 (eight)  hours as needed for Pain.  Qty: 90 tablet, Refills: 0    Comments: Quantity prescribed more than 7 day supply? Yes, quantity medically necessary  Associated Diagnoses: Chronic low back pain without sciatica, unspecified back pain laterality      isosorbide mononitrate (IMDUR) 30 MG 24 hr tablet Take 1 tablet (30 mg total) by mouth once daily.  Qty: 90 tablet, Refills: 3      methotrexate 2.5 MG Tab Take 8 tablets (20 mg total) by mouth every 7 days. TAKE 4 TABLETS MONDAY MORNING AND TAKE 4 TABLETS MONDAY NIGHT ONLY  Qty: 96 tablet, Refills: 0    Comments: **Patient requests 90 days supply**  Associated Diagnoses: Psoriatic arthritis      omeprazole (PRILOSEC) 40 MG capsule TAKE 1 CAPSULE(40 MG) BY MOUTH EVERY MORNING  Qty: 90 capsule, Refills: 3    Associated Diagnoses: Gastroesophageal reflux disease, unspecified whether esophagitis present      !! pregabalin (LYRICA) 25 MG capsule TAKE 1 CAPSULE BY MOUTH EVERY EVENING  Qty: 30 capsule, Refills: 2      !! pregabalin (LYRICA) 50 MG capsule TAKE 1 CAPSULE EVERY EVENING  Qty: 30 capsule, Refills: 2      !! risperiDONE (RISPERDAL) 4 MG tablet Take 1 tablet (4 mg total) by mouth every evening.  Qty: 90 tablet, Refills: 3    Associated Diagnoses: Paranoid schizophrenia      valsartan (DIOVAN) 160 MG tablet Take 160 mg by mouth once daily. Patient can also take 80mg in the evening      !! venlafaxine (EFFEXOR-XR) 37.5 MG 24 hr capsule Take 1 capsule (37.5 mg total) by mouth once daily. Take with 75mg capsule to equal 112.5mg daily.  Qty: 90 capsule, Refills: 3    Associated Diagnoses: RASHIDA (generalized anxiety disorder)      !! venlafaxine (EFFEXOR-XR) 75 MG 24 hr capsule Take 1 capsule (75 mg total) by mouth once daily. Take with 37.5mg capsule to equal 112.5mg daily.  Qty: 90 capsule, Refills: 3    Associated Diagnoses: Mild episode of recurrent major depressive disorder; RASHIDA (generalized anxiety disorder)      ascorbic acid, vitamin C, (VITAMIN C) 500 MG tablet Take 500  mg by mouth once daily.      azelastine (ASTELIN) 137 mcg (0.1 %) nasal spray 1 spray (137 mcg total) by Nasal route 2 (two) times daily.  Qty: 30 mL, Refills: 0    Associated Diagnoses: Allergic rhinitis, unspecified seasonality, unspecified trigger      baclofen (LIORESAL) 10 MG tablet 1 tab po tid for muscle cramps.  Qty: 60 tablet, Refills: 1      budesonide-formoterol 80-4.5 mcg (SYMBICORT) 80-4.5 mcg/actuation HFAA INHALE 2 PUFFS BY MOUTH TWICE DAILY  Qty: 30.6 g, Refills: 2    Associated Diagnoses: Mucopurulent chronic bronchitis      fluticasone propionate (FLONASE) 50 mcg/actuation nasal spray SHAKE LIQUID AND USE 1 SPRAY(50 MCG) IN EACH NOSTRIL DAILY  Qty: 32 g, Refills: 2    Associated Diagnoses: Acute bacterial sinusitis      folic acid (FOLVITE) 1 MG tablet Take 1 tablet (1,000 mcg total) by mouth once daily.  Qty: 90 tablet, Refills: 3    Comments: Requesting 1 year supply  Associated Diagnoses: Psoriatic arthritis      immun glob G,IgG,-pro-IgA 0-50 (HIZENTRA) 4 gram/20 mL (20 %) Soln Inject 60 mLs (12 g total) into the skin every 14 (fourteen) days.  Qty: 120 mL, Refills: 11    Associated Diagnoses: CVID (common variable immunodeficiency)      LIDOcaine (LIDODERM) 5 % Place 1 patch onto the skin once daily. Remove & Discard patch within 12 hours or as directed by MD  Qty: 30 patch, Refills: 0    Associated Diagnoses: Bilateral sacroiliitis      meloxicam (MOBIC) 7.5 MG tablet Take 1 tablet (7.5 mg total) by mouth once daily.  Qty: 14 tablet, Refills: 0    Associated Diagnoses: Juvenile idiopathic scoliosis of thoracolumbar region; Lumbar radiculopathy      omega-3 fatty acids/fish oil (FISH OIL-OMEGA-3 FATTY ACIDS) 300-1,000 mg capsule Take by mouth once daily.      !! risperiDONE (RISPERDAL) 2 MG tablet TAKE 1 TABLET(2 MG) BY MOUTH EVERY MORNING  Qty: 90 tablet, Refills: 3    Associated Diagnoses: Paranoid schizophrenia      traMADoL (ULTRAM) 50 mg tablet Take 1 tablet (50 mg total) by mouth every 8  (eight) hours as needed for Pain.  Qty: 90 each, Refills: 0    Comments: Quantity prescribed more than 7 day supply? 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      vitamin E 200 UNIT capsule Take 200 Units by mouth once daily.      zinc gluconate 50 mg tablet Take 50 mg by mouth once daily.       !! - Potential duplicate medications found. Please discuss with provider.              Discharge Diagnosis: Lumbosacral radiculopathy [M54.17]  Condition on Discharge: Stable with no complications to procedure   Diet on Discharge: Same as before.  Activity: as per instruction sheet.  Discharge to: Home with a responsible adult.  Follow up: 2-4 weeks       Please call my office or pager at [phone] if experienced any weakness or loss of sensation, fever > 101.5, pain uncontrolled with oral medications, persistent nausea/vomiting/or diarrhea, redness or drainage from the incisions, or any other worrisome concerns. If physician on call was not reached or could not communicate with our office for any reason please go to the nearest emergency department

## 2025-01-29 NOTE — OP NOTE
Caudal Epidural Steroid Injection with Catheter under Fluoroscopic Guidance    The procedure, risks, benefits, and options were discussed with the patient. There are no contraindications to the procedure. The patent expressed understanding and agreed to the procedure. Informed written consent was obtained prior to the start of the procedure and can be found in the patient's chart.    PATIENT NAME: Bhavna Landry   MRN: [id number]     DATE OF PROCEDURE: 01/29/2025    PROCEDURE: Caudal Epidural Steroid Injection under Fluoroscopic Guidance    PRE-OP DIAGNOSIS: Lumbosacral radiculopathy [M54.17] Lumbar radiculopathy [M54.16]    POST-OP DIAGNOSIS: Same    PHYSICIAN: Maile Storm DO    ASSISTANTS: Phuong Nguyen, DO [facility]      MEDICATIONS INJECTED: Preservative-free Decadron 10mg with 4cc of Lidocaine 1% MPF and 2 cc of PFNS    LOCAL ANESTHETIC INJECTED: Xylocaine 2%     SEDATION: Versed 2mg and Fentanyl 100mcg                                                                                                                                                                                     Conscious sedation ordered by M.D. Patient re-evaluation prior to administration of conscious sedation. No changes noted in patient's status from initial evaluation. The patient's vital signs were monitored by RN and patient remained hemodynamically stable throughout the procedure.    Event Time In   Sedation Start 0810       ESTIMATED BLOOD LOSS: None    COMPLICATIONS: None    TECHNIQUE: Time-out was performed to identify the patient and procedure to be performed. With the patient laying in a prone position, the surgical area was prepped and draped in the usual sterile fashion using ChloraPrep and a fenestrated drape. The injection site was determined under fluoroscopy guidance. Skin anesthesia was achieved by injecting Lidocaine 2% over the injection site. The sacrum and sacral cornua were then approached with a 16  gauge, 3.5 inch epidural needle that was introduced and advanced into the sacral hiatus under fluoroscopic guidance with AP, lateral and/or contralateral oblique imaging. After the needle passed through the sacrococcygeal ligament, the needle angle was lowered and the needle was advanced 1 cm. After negative aspiration of blood or CSF, and no evidence of paraesthesias, the catheter was threaded through the needle into the epidural space using live fluoroscopy, contrast dye Omnipaque (300mg/mL) was injected to confirm placement and there was no vascular runoff. Fluoroscopic imaging in the AP and lateral views revealed a clear outline of the spinal nerve with proximal spread of agent through the caudal epidural space. Then 7 ml of the medication mixture listed above was injected slowly. Displacement of the radio opaque contrast after injection of the medication confirmed that the medication went into the area of the transforaminal spaces. The needles were removed and bleeding was nil. A sterile dressing was applied. No specimens collected. The patient tolerated the procedure well.     The patient was monitored after the procedure in the recovery area. They were given post-procedure and discharge instructions to follow at home. The patient was discharged in a stable condition.    Maile Storm DO

## 2025-01-29 NOTE — H&P
HPI  Patient presenting for Procedure(s) (LRB):  Caudal SOPHY (N/A)     Patient on Anti-coagulation ASA 81 mg      No health changes since previous encounter    Past Medical History:   Diagnosis Date    Allergy     Amblyopia     Anemia     Anticoagulant long-term use     Arthritis 1992    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, estrogen receptor positive 2022    Cataract     Depression     Dry eyes     Dry mouth     Duane's syndrome of right eye     Early dry stage nonexudative age-related macular degeneration of both eyes 2022    Fibromyalgia 2014    Fibromyalgia     Fractured hip     RIGHT HIP    GERD (gastroesophageal reflux disease)     History of psychiatric hospitalization     Hyperlipidemia 1992    Hypertension     Hypocalcemia     Hyponatremia     Kidney stone     Migraine headache     Osteoporosis     Pressure ulcer of unspecified site, unspecified stage     Psoriatic arthritis 1992    Recurrent upper respiratory infection (URI)     Right knee pain     post knee replacement surgery (possible rejectiion of metal)    RLS (restless legs syndrome)     Schizophrenia 1992    stable on meds    Sciatica     Squamous cell carcinoma of skin     Urinary tract infection      Past Surgical History:   Procedure Laterality Date    brow ptosis repair Right 2019    Surgeon: Dr. Karla Henson    CATARACT EXTRACTION      CAUDAL EPIDURAL STEROID INJECTION N/A 2024    Procedure: Caudal SOPHY;  Surgeon: Maile Storm DO;  Location: [facility] PAIN MANAGEMENT;  Service: Pain Management;  Laterality: N/A;  ASA ok     SECTION      COLONOSCOPY N/A 2016    Procedure: COLONOSCOPY;  Surgeon: ANDRIA Connelly MD;  [facility]  Service: Endoscopy;  Laterality: N/A;    COLONOSCOPY N/A 2022    Procedure: COLONOSCOPY;  Surgeon: Mikey Walters MD;  Location: [facility] ENDO;  Service: Endoscopy;  Laterality: N/A;    cyst removed from right  sinus  07/09/1982    EPIDURAL STEROID INJECTION INTO CERVICAL SPINE N/A 12/08/2023    Procedure: C6-7 SOPHY;  Surgeon: Spring Jensen MD;  Location: [facility] PAIN MANAGEMENT;  Service: Pain Management;  Laterality: N/A;  20 mins    EPIDURAL STEROID INJECTION INTO LUMBAR SPINE N/A 08/02/2023    Procedure: Injection-steroid-epidural-lumbar L5-S1;  Surgeon: Chirag Kim MD;  Location: [facility] PAIN MANAGEMENT;  Service: Pain Management;  Laterality: N/A;  asa    EPIDURAL STEROID INJECTION INTO LUMBAR SPINE N/A 5/22/2024    Procedure: L5/S1 Interlaminar SOPHY;  Surgeon: Maile Storm DO;  Location: [facility] PAIN MANAGEMENT;  Service: Pain Management;  Laterality: N/A;  20mins-ASA 5d    ESOPHAGOGASTRODUODENOSCOPY N/A 02/07/2023    Procedure: EGD (ESOPHAGOGASTRODUODENOSCOPY);  Surgeon: Dougie Shea MD;  Location: [facility];  Service: Endoscopy;  Laterality: N/A;    FOOT FRACTURE SURGERY      HYSTERECTOMY      VAGINAL HYSTERECTOMY WITHOUT BSO - ENDOMETRIOSIS    INJECTION FOR SENTINEL NODE IDENTIFICATION Right 05/09/2022    Procedure: INJECTION, FOR SENTINEL NODE IDENTIFICATION-Right;  Surgeon: NEAL Dhillon MD;  [facility];  Service: General;  Laterality: Right;    INJECTION OF ANESTHETIC AGENT AROUND MEDIAL BRANCH NERVES INNERVATING LUMBAR FACET JOINT N/A 04/11/2019    Procedure: Lumbo-sacral Block, DR5 and Lateral Branches of S1,S2, S3;  Surgeon: Michelle Pineda Jr., MD;  Location: [facility] PAIN MGT;  Service: Pain Management;  Laterality: N/A;  Pt takes  and states she holds ASA on her own whenever she has procedures.  Instructed to hold x 3 days prior to procedure.      INJECTION OF ANESTHETIC AGENT AROUND MEDIAL BRANCH NERVES INNERVATING LUMBAR FACET JOINT Bilateral 05/03/2023    Procedure: Block-nerve-medial branch-lumbar bilateral L3, L4, L5;  Surgeon: Maile Storm DO;  Location: [facility] PAIN MGT;  Service: Pain Management;  Laterality: Bilateral;  asa    INJECTION OF ANESTHETIC AGENT INTO SACROILIAC  JOINT Right 08/30/2018    Procedure: BLOCK, SACROILIAC JOINT-Right- ORAL SEDATION;  Surgeon: Michelle Pineda Jr., MD;  Location: [facility] PAIN MGT;  Service: Pain Management;  Laterality: Right;    INJECTION OF ANESTHETIC AGENT INTO SACROILIAC JOINT Bilateral 09/27/2018    Procedure: BLOCK, SACROILIAC JOINT-BILATERAL;  Surgeon: Michelle Pineda Jr., MD;  Location: [facility] PAIN MGT;  Service: Pain Management;  Laterality: Bilateral;  Please keep at 10:00 due to trasnsportation    INJECTION OF ANESTHETIC AGENT INTO SACROILIAC JOINT Bilateral 02/07/2019    Procedure: Bilateral Sacroiliac Joint Injection - Per Dr Pineda, not necessary to hold ASA.;  Surgeon: Michelle Pineda Jr., MD;  Location: [facility] PAIN MGT;  Service: Pain Management;  Laterality: Bilateral;    INJECTION, SPINE, LUMBOSACRAL, TRANSFORAMINAL APPROACH Right 7/17/2024    Procedure: Right  L3/4  and L4/5 TFESI;  Surgeon: Maile Storm DO;  Location: [facility] PAIN MANAGEMENT;  Service: Pain Management;  Laterality: Right;  20 mins  ASA 5 days    INTRAOCULAR PROSTHESES INSERTION Right 08/01/2019    Procedure: INSERTION, IOL PROSTHESIS;  Surgeon: Karen Song MD;  Location: [address];  Service: Ophthalmology;  Laterality: Right;    INTRAOCULAR PROSTHESES INSERTION Left 09/26/2019    Procedure: INSERTION, IOL PROSTHESIS;  Surgeon: Karen Song MD;  Location: [address];  Service: Ophthalmology;  Laterality: Left;    JOINT REPLACEMENT Right     knee    KNEE SURGERY      MASTECTOMY Right 05/09/2022    Procedure: MASTECTOMY-Right;  Surgeon: NEAL Dhillon MD;  Location: [facility];  Service: General;  Laterality: Right;  2.5 HOURS    PHACOEMULSIFICATION OF CATARACT Right 08/01/2019    Procedure: PHACOEMULSIFICATION, CATARACT;  Surgeon: Karen Song MD;  Location: [address];  Service: Ophthalmology;  Laterality: Right;    PHACOEMULSIFICATION OF CATARACT Left 09/26/2019    Procedure: PHACOEMULSIFICATION, CATARACT;  Surgeon: Karen NO  MD Duncan;  Location: [facility] OR [address] [place] [address];  Service: Ophthalmology;  Laterality: Left;    RADIOFREQUENCY ABLATION, NERVE, PERIPHERAL Right 04/24/2024    Procedure: RIGHT L5 Dorsal Ramus and RIGHT Lateral Branches of S1, S2, and S3;  Surgeon: Maile Storm DO;  Location: [facility] PAIN MANAGEMENT;  Service: Pain Management;  Laterality: Right;  30 mins    RADIOFREQUENCY THERMOCOAGULATION Right 05/07/2019    Procedure: RADIOFREQUENCY THERMAL COAGULATION RIGHT DORSAL RAMUS 5 AND LATERAL BRANCH OF S1, S2 AND S3;  Surgeon: Michelle Pineda Jr., MD;  Location: [facility] PAIN T;  Service: Pain Management;  Laterality: Right;    RADIOFREQUENCY THERMOCOAGULATION Left 05/14/2019    Procedure: RADIOFREQUENCY THERMAL COAGULATION LEFT DORSAL RAMUS 5 AND LATERAL BRANCH OF S1,S2 AND S3;  Surgeon: Michelle Pineda Jr., MD;  Location: [facility] PAIN T;  Service: Pain Management;  Laterality: Left;    RADIOFREQUENCY THERMOCOAGULATION Right 10/22/2019    Procedure: RADIOFREQUENCY THERMAL COAGULATION---DARSAL RAMUS 5 and LATERAL S1,S2,and S3 Right;  Surgeon: Michelle Pineda Jr., MD;  Location: [facility] PAIN T;  Service: Pain Management;  Laterality: Right;  patient to sign consent DOS    RADIOFREQUENCY THERMOCOAGULATION Left 10/29/2019    Procedure: RADIOFREQUENCY THERMAL COAGULATION - LEFT - DR5, S1,S2, AND S3;  Surgeon: Michelle Pineda Jr., MD;  Location: [facility] PAIN T;  Service: Pain Management;  Laterality: Left;    RADIOFREQUENCY THERMOCOAGULATION Left 05/26/2020    Procedure: RADIOFREQUENCY THERMAL COAGULATION--Left DR5+ lateral branches of S1, S2, S3;  Surgeon: Michelle Pineda Jr., MD;  Location: [facility] PAIN MGT;  Service: Pain Management;  Laterality: Left;    RADIOFREQUENCY THERMOCOAGULATION Right 06/02/2020    Procedure: RADIOFREQUENCY THERMAL COAGULATION--Right DR5+ lateral branches of S1, S2, S3;  Surgeon: Michelle Pineda Jr., MD;  Location: [facility] PAIN T;  Service: Pain Management;  Laterality: Right;    SENTINEL LYMPH NODE  "BIOPSY Right 05/09/2022    Procedure: BIOPSY, LYMPH NODE, SENTINEL-Right;  Surgeon: NEAL Dhillon MD;  Location: [facility];  Service: General;  Laterality: Right;    SINUS SURGERY      Surgery on right knee  07/09/1982    TONSILLECTOMY      tumor removed from back left side upper shoulder  03/17/2006     Review of patient's allergies indicates:   Allergen Reactions    Etanercept Other (See Comments)     Other reaction(s): recurrent infections    Chloramphenicol sod succinate Hives    Codeine Other (See Comments)     Other reaction(s): Stomach upset. Pt states OK with Percocet    Nickel sutures [surgical stainless steel] Dermatitis     Allergic contact dermatitis    Adhesive Rash      Current Facility-Administered Medications   Medication    0.9% NaCl infusion    LIDOcaine (PF) 10 mg/ml (1%) injection 10 mg     Facility-Administered Medications Ordered in Other Encounters   Medication    tropicamide 1% ophthalmic solution 1 drop       PMHx, PSHx, Allergies, Medications reviewed in epic    ROS negative except pain complaints in HPI    OBJECTIVE:    BP (!) 133/96 (BP Location: Left arm, Patient Position: Lying)   Pulse 76   Temp 98.1 °F (36.7 °C) (Temporal)   Resp 18   Ht 5' 1" (1.549 m)   Wt 45.8 kg (101 lb)   SpO2 97%   Breastfeeding No   BMI 19.08 kg/m²     PHYSICAL EXAMINATION:    GENERAL: Well appearing, in no acute distress, alert and oriented x3.  PSYCH:  Mood and affect appropriate.  SKIN: Skin color, texture, turgor normal, no rashes or lesions which will impact the procedure.  CV: RRR with palpation of the radial artery.  PULM: No evidence of respiratory difficulty, symmetric chest rise. Clear to auscultation.  NEURO: Cranial nerves grossly intact.    Plan:    Proceed with procedure as planned Procedure(s) (LRB):  Caudal SOPHY (N/A)    Maile Storm  01/29/2025            "

## 2025-01-30 DIAGNOSIS — Z00.00 ENCOUNTER FOR MEDICARE ANNUAL WELLNESS EXAM: ICD-10-CM

## 2025-01-31 ENCOUNTER — INITIAL CONSULT (OUTPATIENT)
Dept: VASCULAR SURGERY | Facility: CLINIC | Age: 70
End: 2025-01-31
Payer: MEDICARE

## 2025-01-31 ENCOUNTER — TELEPHONE (OUTPATIENT)
Dept: RHEUMATOLOGY | Facility: CLINIC | Age: 70
End: 2025-01-31
Payer: MEDICARE

## 2025-01-31 ENCOUNTER — TELEPHONE (OUTPATIENT)
Dept: VASCULAR SURGERY | Facility: CLINIC | Age: 70
End: 2025-01-31

## 2025-01-31 VITALS
RESPIRATION RATE: 18 BRPM | BODY MASS INDEX: 18.98 KG/M2 | HEART RATE: 65 BPM | WEIGHT: 100.5 LBS | HEIGHT: 61 IN | SYSTOLIC BLOOD PRESSURE: 112 MMHG | DIASTOLIC BLOOD PRESSURE: 51 MMHG | TEMPERATURE: 98 F

## 2025-01-31 DIAGNOSIS — F17.210 CIGARETTE NICOTINE DEPENDENCE WITHOUT COMPLICATION: ICD-10-CM

## 2025-01-31 DIAGNOSIS — I65.21 CAROTID ARTERY STENOSIS, ASYMPTOMATIC, RIGHT: ICD-10-CM

## 2025-01-31 DIAGNOSIS — Z01.818 PRE-OP EVALUATION: Primary | ICD-10-CM

## 2025-01-31 DIAGNOSIS — I65.23 BILATERAL CAROTID ARTERY STENOSIS: Primary | ICD-10-CM

## 2025-01-31 DIAGNOSIS — I65.23 BILATERAL CAROTID ARTERY STENOSIS: ICD-10-CM

## 2025-01-31 PROCEDURE — 99999 PR PBB SHADOW E&M-EST. PATIENT-LVL V: CPT | Mod: PBBFAC,,, | Performed by: SURGERY

## 2025-01-31 PROCEDURE — 99205 OFFICE O/P NEW HI 60 MIN: CPT | Mod: 25,S$GLB,, | Performed by: SURGERY

## 2025-01-31 PROCEDURE — 99406 BEHAV CHNG SMOKING 3-10 MIN: CPT | Mod: S$GLB,,, | Performed by: SURGERY

## 2025-01-31 RX ORDER — SODIUM CHLORIDE 9 MG/ML
INJECTION, SOLUTION INTRAVENOUS CONTINUOUS
OUTPATIENT
Start: 2025-01-31

## 2025-01-31 NOTE — PROGRESS NOTES
VASCULAR SURGERY SERVICE    REFERRING DOCTOR: Sadaf Vargas MD    CHIEF COMPLAINT: Asymptomatic right carotid artery stenosis    HISTORY OF PRESENT ILLNESS: Bhavna Landry is a 69 y.o. female presenting to vascular clinic today for referral for right carotid artery stenosis, asymptomatic.  Pertinent medical history includes hypertension on 2 oral antihypertensive agents and chronic back pain being managed with serial epidural injections.  She is a long-time smoker, 50 years, 1 pack per day.  She is currently maintained on an aspirin and high-dose statin.  No history of MI or stroke.  She has never had neck radiation or neck surgery including the cervical spine.  She denies any unilateral weakness, sensory changes, visual changes, syncopal events.  She is otherwise active in her activities of daily living.    Past Medical History:   Diagnosis Date    Allergy     Amblyopia     Anemia     Anticoagulant long-term use     Arthritis 02/02/1992    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, estrogen receptor positive 04/13/2022    Cataract     Depression     Dry eyes     Dry mouth     Duane's syndrome of right eye     Early dry stage nonexudative age-related macular degeneration of both eyes 09/20/2022    Fibromyalgia 04/17/2014    Fibromyalgia     Fractured hip     RIGHT HIP    GERD (gastroesophageal reflux disease)     History of psychiatric hospitalization     Hyperlipidemia 02/02/1992    Hypertension     Hypocalcemia     Hyponatremia     Kidney stone     Migraine headache     Osteoporosis     Pressure ulcer of unspecified site, unspecified stage     Psoriatic arthritis 02/02/1992    Recurrent upper respiratory infection (URI)     Right knee pain     post knee replacement surgery (possible rejectiion of metal)    RLS (restless legs syndrome)     Schizophrenia 02/02/1992    stable on meds    Sciatica     Squamous cell carcinoma of skin     Urinary tract infection        Past Surgical History:    Procedure Laterality Date    brow ptosis repair Right 2019    Surgeon: Dr. Karla Henson    CATARACT EXTRACTION      CAUDAL EPIDURAL STEROID INJECTION N/A 2024    Procedure: Caudal SOPHY;  Surgeon: Maile Storm DO;  Location: [facility];  Service: Pain Management;  Laterality: N/A;  ASA ok    CAUDAL EPIDURAL STEROID INJECTION N/A 2025    Procedure: Caudal SOPHY;  Surgeon: Maile Storm DO;  Location: [facility];  Service: Pain Management;  Laterality: N/A;  asa ok     SECTION      COLONOSCOPY N/A 2016    Procedure: COLONOSCOPY;  Surgeon: ANDRIA Connelly MD;  Location: [facility] ([address] [place] [address]);  Service: Endoscopy;  Laterality: N/A;    COLONOSCOPY N/A 2022    Procedure: COLONOSCOPY;  Surgeon: Mikey Walters MD;  Location: [facility];  Service: Endoscopy;  Laterality: N/A;    cyst removed from right sinus  1982    EPIDURAL STEROID INJECTION INTO CERVICAL SPINE N/A 2023    Procedure: C6-7 SOPHY;  Surgeon: Spring Jensen MD;  Location: [facility];  Service: Pain Management;  Laterality: N/A;  20 mins    EPIDURAL STEROID INJECTION INTO LUMBAR SPINE N/A 2023    Procedure: Injection-steroid-epidural-lumbar L5-S1;  Surgeon: Chirag Kim MD;  Location: [facility];  Service: Pain Management;  Laterality: N/A;  asa    EPIDURAL STEROID INJECTION INTO LUMBAR SPINE N/A 2024    Procedure: L5/S1 Interlaminar SOPHY;  Surgeon: Maile Storm DO;  Location: [facility];  Service: Pain Management;  Laterality: N/A;  20mins-ASA 5d    ESOPHAGOGASTRODUODENOSCOPY N/A 2023    Procedure: EGD (ESOPHAGOGASTRODUODENOSCOPY);  Surgeon: Dougie Shea MD;  Location: [facility];  Service: Endoscopy;  Laterality: N/A;    FOOT FRACTURE SURGERY      HYSTERECTOMY      VAGINAL HYSTERECTOMY WITHOUT BSO - ENDOMETRIOSIS    INJECTION FOR SENTINEL NODE IDENTIFICATION Right 2022    Procedure: INJECTION, FOR SENTINEL NODE  IDENTIFICATION-Right;  Surgeon: NEAL Dhillon MD;  Location: [facility] OR;  Service: General;  Laterality: Right;    INJECTION OF ANESTHETIC AGENT AROUND MEDIAL BRANCH NERVES INNERVATING LUMBAR FACET JOINT N/A 04/11/2019    Procedure: Lumbo-sacral Block, DR5 and Lateral Branches of S1,S2, S3;  Surgeon: Michelle Pineda Jr., MD;  Location: [facility];  Service: Pain Management;  Laterality: N/A;  Pt takes  and states she holds ASA on her own whenever she has procedures.  Instructed to hold x 3 days prior to procedure.      INJECTION OF ANESTHETIC AGENT AROUND MEDIAL BRANCH NERVES INNERVATING LUMBAR FACET JOINT Bilateral 05/03/2023    Procedure: Block-nerve-medial branch-lumbar bilateral L3, L4, L5;  Surgeon: Maile Storm DO;  Location: [facility];  Service: Pain Management;  Laterality: Bilateral;  asa    INJECTION OF ANESTHETIC AGENT INTO SACROILIAC JOINT Right 08/30/2018    Procedure: BLOCK, SACROILIAC JOINT-Right- ORAL SEDATION;  Surgeon: Michelle Pineda Jr., MD;  Location: [facility];  Service: Pain Management;  Laterality: Right;    INJECTION OF ANESTHETIC AGENT INTO SACROILIAC JOINT Bilateral 09/27/2018    Procedure: BLOCK, SACROILIAC JOINT-BILATERAL;  Surgeon: Michelle Pineda Jr., MD;  Location: [facility];  Service: Pain Management;  Laterality: Bilateral;  Please keep at 10:00 due to trasnsportation    INJECTION OF ANESTHETIC AGENT INTO SACROILIAC JOINT Bilateral 02/07/2019    Procedure: Bilateral Sacroiliac Joint Injection - Per Dr Pineda, not necessary to hold ASA.;  Surgeon: Michelle Pineda Jr., MD;  Location: [facility];  Service: Pain Management;  Laterality: Bilateral;    INJECTION, SPINE, LUMBOSACRAL, TRANSFORAMINAL APPROACH Right 7/17/2024    Procedure: Right  L3/4  and L4/5 TFESI;  Surgeon: Maile Storm DO;  Location: [facility];  Service: Pain Management;  Laterality: Right;  20 mins  ASA 5 days    INTRAOCULAR PROSTHESES INSERTION Right 08/01/2019     Procedure: INSERTION, IOL PROSTHESIS;  Surgeon: Karen Song MD;  Location: [facility] OR [address] [place] [address];  Service: Ophthalmology;  Laterality: Right;    INTRAOCULAR PROSTHESES INSERTION Left 09/26/2019    Procedure: INSERTION, IOL PROSTHESIS;  Surgeon: Karen Song MD;  [facility];  Service: Ophthalmology;  Laterality: Left;    JOINT REPLACEMENT Right     knee    KNEE SURGERY      MASTECTOMY Right 05/09/2022    Procedure: MASTECTOMY-Right;  Surgeon: NEAL Dhillon MD;  Location: [facility] OR;  Service: General;  Laterality: Right;  2.5 HOURS    PHACOEMULSIFICATION OF CATARACT Right 08/01/2019    Procedure: PHACOEMULSIFICATION, CATARACT;  Surgeon: Karen Song MD;  Location: [facility] OR [address] [place] [address];  Service: Ophthalmology;  Laterality: Right;    PHACOEMULSIFICATION OF CATARACT Left 09/26/2019    Procedure: PHACOEMULSIFICATION, CATARACT;  Surgeon: Karen Song MD;  Location: [facility] OR [address] [place] [address];  Service: Ophthalmology;  Laterality: Left;    RADIOFREQUENCY ABLATION, NERVE, PERIPHERAL Right 04/24/2024    Procedure: RIGHT L5 Dorsal Ramus and RIGHT Lateral Branches of S1, S2, and S3;  Surgeon: Maile Storm DO;  Location: [facility];  Service: Pain Management;  Laterality: Right;  30 mins    RADIOFREQUENCY THERMOCOAGULATION Right 05/07/2019    Procedure: RADIOFREQUENCY THERMAL COAGULATION RIGHT DORSAL RAMUS 5 AND LATERAL BRANCH OF S1, S2 AND S3;  Surgeon: Michelle Pineda Jr., MD;  Location: [facility] PAIN T;  Service: Pain Management;  Laterality: Right;    RADIOFREQUENCY THERMOCOAGULATION Left 05/14/2019    Procedure: RADIOFREQUENCY THERMAL COAGULATION LEFT DORSAL RAMUS 5 AND LATERAL BRANCH OF S1,S2 AND S3;  Surgeon: Michelle Pineda Jr., MD;  Location: [facility] T;  Service: Pain Management;  Laterality: Left;    RADIOFREQUENCY THERMOCOAGULATION Right 10/22/2019    Procedure: RADIOFREQUENCY THERMAL COAGULATION---DARSAL RAMUS 5 and LATERAL S1,S2,and S3 Right;  Surgeon: Michelle Pineda Jr.  MD;  Location: [facility];  Service: Pain Management;  Laterality: Right;  patient to sign consent DOS    RADIOFREQUENCY THERMOCOAGULATION Left 10/29/2019    Procedure: RADIOFREQUENCY THERMAL COAGULATION - LEFT - DR5, S1,S2, AND S3;  Surgeon: Michelle Pineda Jr., MD;  Location: [facility];  Service: Pain Management;  Laterality: Left;    RADIOFREQUENCY THERMOCOAGULATION Left 05/26/2020    Procedure: RADIOFREQUENCY THERMAL COAGULATION--Left DR5+ lateral branches of S1, S2, S3;  Surgeon: Michelle Pineda Jr., MD;  Location: [facility];  Service: Pain Management;  Laterality: Left;    RADIOFREQUENCY THERMOCOAGULATION Right 06/02/2020    Procedure: RADIOFREQUENCY THERMAL COAGULATION--Right DR5+ lateral branches of S1, S2, S3;  Surgeon: Michelle Pineda Jr., MD;  Location: [facility];  Service: Pain Management;  Laterality: Right;    SENTINEL LYMPH NODE BIOPSY Right 05/09/2022    Procedure: BIOPSY, LYMPH NODE, SENTINEL-Right;  Surgeon: NEAL Dhillon MD;  Location: [facility];  Service: General;  Laterality: Right;    SINUS SURGERY      Surgery on right knee  07/09/1982    TONSILLECTOMY      tumor removed from back left side upper shoulder  03/17/2006         Current Outpatient Medications:     albuterol (PROVENTIL) 2.5 mg /3 mL (0.083 %) nebulizer solution, Take 3 mLs (2.5 mg total) by nebulization every 6 (six) hours as needed for Wheezing. Rescue, Disp: 90 each, Rfl: 3    albuterol (PROVENTIL/VENTOLIN HFA) 90 mcg/actuation inhaler, USE 2 INHALATIONS BY MOUTH 4  TIMES DAILY AS NEEDED, Disp: 34 g, Rfl: 3    amLODIPine (NORVASC) 5 MG tablet, Take 1 tablet (5 mg total) by mouth 2 (two) times a day., Disp: 180 tablet, Rfl: 1    ascorbic acid, vitamin C, (VITAMIN C) 500 MG tablet, Take 500 mg by mouth once daily., Disp: , Rfl:     aspirin (ECOTRIN) 81 MG EC tablet, Take 81 mg by mouth once daily., Disp: , Rfl:     atorvastatin (LIPITOR) 80 MG tablet, Take 1 tablet (80 mg total) by mouth once daily., Disp: 90  tablet, Rfl: 3    benztropine (COGENTIN) 0.5 MG tablet, Take 1 tablet (0.5 mg total) by mouth 2 (two) times daily., Disp: 180 tablet, Rfl: 3    exemestane (AROMASIN) 25 mg tablet, Take 1 tablet (25 mg total) by mouth once daily., Disp: 30 tablet, Rfl: 11    fluticasone propionate (FLONASE) 50 mcg/actuation nasal spray, SHAKE LIQUID AND USE 1 SPRAY(50 MCG) IN EACH NOSTRIL DAILY, Disp: 32 g, Rfl: 2    folic acid (FOLVITE) 1 MG tablet, Take 1 tablet (1,000 mcg total) by mouth once daily., Disp: 90 tablet, Rfl: 3    isosorbide mononitrate (IMDUR) 30 MG 24 hr tablet, Take 1 tablet (30 mg total) by mouth once daily., Disp: 90 tablet, Rfl: 3    LIDOcaine (LIDODERM) 5 %, Place 1 patch onto the skin once daily. Remove & Discard patch within 12 hours or as directed by MD, Disp: 30 patch, Rfl: 0    meloxicam (MOBIC) 7.5 MG tablet, Take 1 tablet (7.5 mg total) by mouth once daily., Disp: 14 tablet, Rfl: 0    methotrexate 2.5 MG Tab, Take 8 tablets (20 mg total) by mouth every 7 days. TAKE 4 TABLETS MONDAY MORNING AND TAKE 4 TABLETS MONDAY NIGHT ONLY, Disp: 96 tablet, Rfl: 0    omega-3 fatty acids/fish oil (FISH OIL-OMEGA-3 FATTY ACIDS) 300-1,000 mg capsule, Take by mouth once daily., Disp: , Rfl:     omeprazole (PRILOSEC) 40 MG capsule, TAKE 1 CAPSULE(40 MG) BY MOUTH EVERY MORNING, Disp: 90 capsule, Rfl: 3    pregabalin (LYRICA) 50 MG capsule, TAKE 1 CAPSULE EVERY EVENING, Disp: 30 capsule, Rfl: 2    risperiDONE (RISPERDAL) 2 MG tablet, TAKE 1 TABLET(2 MG) BY MOUTH EVERY MORNING, Disp: 90 tablet, Rfl: 3    risperiDONE (RISPERDAL) 4 MG tablet, Take 1 tablet (4 mg total) by mouth every evening., Disp: 90 tablet, Rfl: 3    traMADoL (ULTRAM) 50 mg tablet, Take 1 tablet (50 mg total) by mouth every 8 (eight) hours as needed for Pain., Disp: 90 each, Rfl: 0    valsartan (DIOVAN) 160 MG tablet, Take 160 mg by mouth once daily. Patient can also take 80mg in the evening, Disp: , Rfl:     venlafaxine (EFFEXOR-XR) 37.5 MG 24 hr capsule,  Take 1 capsule (37.5 mg total) by mouth once daily. Take with 75mg capsule to equal 112.5mg daily., Disp: 90 capsule, Rfl: 3    venlafaxine (EFFEXOR-XR) 75 MG 24 hr capsule, Take 1 capsule (75 mg total) by mouth once daily. Take with 37.5mg capsule to equal 112.5mg daily., Disp: 90 capsule, Rfl: 3    vitamin D (VITAMIN D3) 1000 units Tab, Take 1,000 Units by mouth once daily., Disp: , Rfl:     vitamin E 200 UNIT capsule, Take 200 Units by mouth once daily., Disp: , Rfl:     zinc gluconate 50 mg tablet, Take 50 mg by mouth once daily., Disp: , Rfl:     azelastine (ASTELIN) 137 mcg (0.1 %) nasal spray, 1 spray (137 mcg total) by Nasal route 2 (two) times daily. (Patient not taking: Reported on 1/31/2025), Disp: 30 mL, Rfl: 0    baclofen (LIORESAL) 10 MG tablet, 1 tab po tid for muscle cramps. (Patient not taking: Reported on 1/31/2025), Disp: 60 tablet, Rfl: 1    budesonide-formoterol 80-4.5 mcg (SYMBICORT) 80-4.5 mcg/actuation HFAA, INHALE 2 PUFFS BY MOUTH TWICE DAILY (Patient not taking: Reported on 1/31/2025), Disp: 30.6 g, Rfl: 2    HYDROcodone-acetaminophen (NORCO) 5-325 mg per tablet, Take 1 tablet by mouth every 8 (eight) hours as needed for Pain. (Patient not taking: Reported on 1/31/2025), Disp: 90 tablet, Rfl: 0    immun glob G,IgG,-pro-IgA 0-50 (HIZENTRA) 4 gram/20 mL (20 %) Soln, Inject 60 mLs (12 g total) into the skin every 14 (fourteen) days. (Patient not taking: Reported on 1/31/2025), Disp: 120 mL, Rfl: 11    pregabalin (LYRICA) 25 MG capsule, TAKE 1 CAPSULE BY MOUTH EVERY EVENING (Patient not taking: Reported on 1/31/2025), Disp: 30 capsule, Rfl: 2  No current facility-administered medications for this visit.    Facility-Administered Medications Ordered in Other Visits:     tropicamide 1% ophthalmic solution 1 drop, 1 drop, Right Eye, On Call Procedure, Karen Song MD, 1 drop at 08/01/19 0630    Review of patient's allergies indicates:   Allergen Reactions    Etanercept Other (See Comments)      Other reaction(s): recurrent infections    Chloramphenicol sod succinate Hives    Codeine Other (See Comments)     Other reaction(s): Stomach upset. Pt states OK with Percocet    Nickel sutures [surgical stainless steel] Dermatitis     Allergic contact dermatitis    Adhesive Rash       Family History   Problem Relation Name Age of Onset    Asthma Mother      Colon cancer Mother      Psoriasis Mother      Cancer Mother          colon    Depression Mother      Cancer Father          lymphoma    Stroke Sister      Diabetes Brother x2     Arthritis Brother x2     Heart disease Brother x2         cad    No Known Problems Daughter      Hypertension Neg Hx      Suicide Neg Hx      Amblyopia Neg Hx      Blindness Neg Hx      Cataracts Neg Hx      Glaucoma Neg Hx      Macular degeneration Neg Hx      Retinal detachment Neg Hx      Strabismus Neg Hx      Eczema Neg Hx      Allergies Neg Hx         Social History     Tobacco Use    Smoking status: Every Day     Current packs/day: 0.00     Average packs/day: 0.3 packs/day for 10.0 years (2.5 ttl pk-yrs)     Types: Cigarettes     Start date: 1/10/2013     Last attempt to quit: 1/10/2023     Years since quittin.0     Passive exposure: Never    Smokeless tobacco: Former    Tobacco comments:     about 10 cigs a day 1/15/25   Substance Use Topics    Alcohol use: No    Drug use: No       REVIEW OF SYSTEMS:  General: No chills, fever, malaise, changes in weight  HEENT: No visual changes, difficulty hearing  Cardiovascular: No chest pain, palpitations, claudication  Pulmonary: No dyspnea, cough, wheezing  Gastrointestinal: No nausea, vomiting, diarrhea, constipation  Genitourinary: No dysuria, low urine output, hematuria  Endocrine: No polydipsia, polyphagia  Hematologic: No fatigue with exertion, pica, pallor  Musculoskeletal: No extremity or joint pain, no back pain, no difficulty with ambulation  Neurologic: no seizures, no headaches, no weakness  Psychiatric: no mood  "disturbance      PHYSICAL EXAM:   BP (!) 112/51 (BP Location: Left arm, Patient Position: Sitting)   Pulse 65   Temp 97.5 °F (36.4 °C) (Oral)   Resp 18   Ht 5' 1" (1.549 m)   Wt 45.6 kg (100 lb 8.5 oz)   BMI 18.99 kg/m²   Constitutional:  Alert,   Well-appearing  In no distress.   Neurological: Normal speech  no focal findings   Psychiatric: Mood and affect appropriate and symmetric.   HEENT: Normocephalic / atraumatic  PERRLA  Midline trachea  No scars across the neck   Cardiac: Regular rate and rhythm.   Pulmonary: Normal pulmonary effort.   Abdomen: Soft, not distended.     Skin: Warm and well perfused.    Vascular:  Palpable radial artery pulses, 2+, bilaterally   Extremities/  Musculoskeletal: No edema.   No deformities.      IMAGIN/3/25 Duplex significant for R carotid bulb  cm/s, R ICA/CCA ratio 3.73.     IMPRESSION: Asymptomatic R ICA stenosis, > 70%. Warrants intervention.     PLAN:   Will plan for right carotid endarterectomy on  [facility].   C/w ASA, high dose statin,   Smoking cessation importance reviewed and emphasized > 3 min spent    I have personally taken the history and examined this patient and agree with the resident's note as stated above     ATUL Johnson III, MD, FACS  Professor and Chief, Vascular and Endovascular Surgery    "

## 2025-01-31 NOTE — H&P (VIEW-ONLY)
VASCULAR SURGERY SERVICE    REFERRING DOCTOR: Sadaf Vargas MD    CHIEF COMPLAINT: Asymptomatic right carotid artery stenosis    HISTORY OF PRESENT ILLNESS: Bhavna Landry is a 69 y.o. female presenting to vascular clinic today for referral for right carotid artery stenosis, asymptomatic.  Pertinent medical history includes hypertension on 2 oral antihypertensive agents and chronic back pain being managed with serial epidural injections.  She is a long-time smoker, 50 years, 1 pack per day.  She is currently maintained on an aspirin and high-dose statin.  No history of MI or stroke.  She has never had neck radiation or neck surgery including the cervical spine.  She denies any unilateral weakness, sensory changes, visual changes, syncopal events.  She is otherwise active in her activities of daily living.    Past Medical History:   Diagnosis Date    Allergy     Amblyopia     Anemia     Anticoagulant long-term use     Arthritis 02/02/1992    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, estrogen receptor positive 04/13/2022    Cataract     Depression     Dry eyes     Dry mouth     Duane's syndrome of right eye     Early dry stage nonexudative age-related macular degeneration of both eyes 09/20/2022    Fibromyalgia 04/17/2014    Fibromyalgia     Fractured hip     RIGHT HIP    GERD (gastroesophageal reflux disease)     History of psychiatric hospitalization     Hyperlipidemia 02/02/1992    Hypertension     Hypocalcemia     Hyponatremia     Kidney stone     Migraine headache     Osteoporosis     Pressure ulcer of unspecified site, unspecified stage     Psoriatic arthritis 02/02/1992    Recurrent upper respiratory infection (URI)     Right knee pain     post knee replacement surgery (possible rejectiion of metal)    RLS (restless legs syndrome)     Schizophrenia 02/02/1992    stable on meds    Sciatica     Squamous cell carcinoma of skin     Urinary tract infection        Past Surgical History:    Procedure Laterality Date    brow ptosis repair Right 2019    Surgeon: Dr. Karla Henson    CATARACT EXTRACTION      CAUDAL EPIDURAL STEROID INJECTION N/A 2024    Procedure: Caudal SOPHY;  Surgeon: Maile Storm DO;  Location: [facility] PAIN MANAGEMENT;  Service: Pain Management;  Laterality: N/A;  ASA ok    CAUDAL EPIDURAL STEROID INJECTION N/A 2025    Procedure: Caudal SOPHY;  Surgeon: Maile Storm DO;  Location: [facility] PAIN MANAGEMENT;  Service: Pain Management;  Laterality: N/A;  asa ok     SECTION      COLONOSCOPY N/A 2016    Procedure: COLONOSCOPY;  Surgeon: ANDRIA Connelly MD;  Location: [facility] ([address] [place] [address]);  Service: Endoscopy;  Laterality: N/A;    COLONOSCOPY N/A 2022    Procedure: COLONOSCOPY;  Surgeon: Mikey Walters MD;  Location: [facility];  Service: Endoscopy;  Laterality: N/A;    cyst removed from right sinus  1982    EPIDURAL STEROID INJECTION INTO CERVICAL SPINE N/A 2023    Procedure: C6-7 SOPHY;  Surgeon: Spring Jensen MD;  Location: [facility] PAIN MANAGEMENT;  Service: Pain Management;  Laterality: N/A;  20 mins    EPIDURAL STEROID INJECTION INTO LUMBAR SPINE N/A 2023    Procedure: Injection-steroid-epidural-lumbar L5-S1;  Surgeon: Chirag Kim MD;  Location: [facility] PAIN MANAGEMENT;  Service: Pain Management;  Laterality: N/A;  asa    EPIDURAL STEROID INJECTION INTO LUMBAR SPINE N/A 2024    Procedure: L5/S1 Interlaminar SOPHY;  Surgeon: Maile Storm DO;  Location: [facility] PAIN MANAGEMENT;  Service: Pain Management;  Laterality: N/A;  20mins-ASA 5d    ESOPHAGOGASTRODUODENOSCOPY N/A 2023    Procedure: EGD (ESOPHAGOGASTRODUODENOSCOPY);  Surgeon: Dougie Shea MD;  Location: [facility];  Service: Endoscopy;  Laterality: N/A;    FOOT FRACTURE SURGERY      HYSTERECTOMY      VAGINAL HYSTERECTOMY WITHOUT BSO - ENDOMETRIOSIS    INJECTION FOR SENTINEL NODE IDENTIFICATION Right 2022    Procedure: INJECTION, FOR SENTINEL NODE  IDENTIFICATION-Right;  Surgeon: NEAL Dhillon MD;  Location: [facility] OR;  Service: General;  Laterality: Right;    INJECTION OF ANESTHETIC AGENT AROUND MEDIAL BRANCH NERVES INNERVATING LUMBAR FACET JOINT N/A 04/11/2019    Procedure: Lumbo-sacral Block, DR5 and Lateral Branches of S1,S2, S3;  Surgeon: Michelle Pineda Jr., MD;  [place] [facility] [place] [facility] [place]  Service: Pain Management;  Laterality: N/A;  Pt takes  and states she holds ASA on her own whenever she has procedures.  Instructed to hold x 3 days prior to procedure.      INJECTION OF ANESTHETIC AGENT AROUND MEDIAL BRANCH NERVES INNERVATING LUMBAR FACET JOINT Bilateral 05/03/2023    Procedure: Block-nerve-medial branch-lumbar bilateral L3, L4, L5;  Surgeon: Maile Storm DO;  [place] [facility] [place] [facility] [place]  Service: Pain Management;  Laterality: Bilateral;  asa    INJECTION OF ANESTHETIC AGENT INTO SACROILIAC JOINT Right 08/30/2018    Procedure: BLOCK, SACROILIAC JOINT-Right- ORAL SEDATION;  Surgeon: Michelle Pineda Jr., MD;  [place] [facility] [place] [facility] [place]  Service: Pain Management;  Laterality: Right;    INJECTION OF ANESTHETIC AGENT INTO SACROILIAC JOINT Bilateral 09/27/2018    Procedure: BLOCK, SACROILIAC JOINT-BILATERAL;  Surgeon: Michelle Pineda Jr., MD;  Location: [facility];  Service: Pain Management;  Laterality: Bilateral;  Please keep at 10:00 due to trasnsportation    INJECTION OF ANESTHETIC AGENT INTO SACROILIAC JOINT Bilateral 02/07/2019    Procedure: Bilateral Sacroiliac Joint Injection - Per Dr Pineda, not necessary to hold ASA.;  Surgeon: Michelle Pineda Jr., MD;  [place] [facility] [place] [facility] [place]  Service: Pain Management;  Laterality: Bilateral;    INJECTION, SPINE, LUMBOSACRAL, TRANSFORAMINAL APPROACH Right 7/17/2024    Procedure: Right  L3/4  and L4/5 TFESI;  Surgeon: Maile Storm DO;  Location: [facility]NAGEMENT;  Service: Pain Management;  Laterality: Right;  20 mins  ASA 5 days    INTRAOCULAR PROSTHESES INSERTION Right 08/01/2019     Procedure: INSERTION, IOL PROSTHESIS;  Surgeon: Karen Song MD;  Location: [facility] OR [address] [place] [address];  Service: Ophthalmology;  Laterality: Right;    INTRAOCULAR PROSTHESES INSERTION Left 09/26/2019    Procedure: INSERTION, IOL PROSTHESIS;  Surgeon: Karen Song MD;  [facility];  Service: Ophthalmology;  Laterality: Left;    JOINT REPLACEMENT Right     knee    KNEE SURGERY      MASTECTOMY Right 05/09/2022    Procedure: MASTECTOMY-Right;  Surgeon: NEAL Dhillon MD;  Location: [facility] OR;  Service: General;  Laterality: Right;  2.5 HOURS    PHACOEMULSIFICATION OF CATARACT Right 08/01/2019    Procedure: PHACOEMULSIFICATION, CATARACT;  Surgeon: Karen Song MD;  Location: [facility] OR [address] [place] [address];  Service: Ophthalmology;  Laterality: Right;    PHACOEMULSIFICATION OF CATARACT Left 09/26/2019    Procedure: PHACOEMULSIFICATION, CATARACT;  Surgeon: Karen Song MD;  Location: [facility] OR [address] [place] [address];  Service: Ophthalmology;  Laterality: Left;    RADIOFREQUENCY ABLATION, NERVE, PERIPHERAL Right 04/24/2024    Procedure: RIGHT L5 Dorsal Ramus and RIGHT Lateral Branches of S1, S2, and S3;  Surgeon: Maile Storm DO;  Location: [facility];  Service: Pain Management;  Laterality: Right;  30 mins    RADIOFREQUENCY THERMOCOAGULATION Right 05/07/2019    Procedure: RADIOFREQUENCY THERMAL COAGULATION RIGHT DORSAL RAMUS 5 AND LATERAL BRANCH OF S1, S2 AND S3;  Surgeon: Michelle Pineda Jr., MD;  Location: [facility] PAIN T;  Service: Pain Management;  Laterality: Right;    RADIOFREQUENCY THERMOCOAGULATION Left 05/14/2019    Procedure: RADIOFREQUENCY THERMAL COAGULATION LEFT DORSAL RAMUS 5 AND LATERAL BRANCH OF S1,S2 AND S3;  Surgeon: Michelle Pineda Jr., MD;  Location: [facility] PAIN T;  Service: Pain Management;  Laterality: Left;    RADIOFREQUENCY THERMOCOAGULATION Right 10/22/2019    Procedure: RADIOFREQUENCY THERMAL COAGULATION---DARSAL RAMUS 5 and LATERAL S1,S2,and S3 Right;  Surgeon: Michelle Pineda Jr.  MD;  Location: [facility];  Service: Pain Management;  Laterality: Right;  patient to sign consent DOS    RADIOFREQUENCY THERMOCOAGULATION Left 10/29/2019    Procedure: RADIOFREQUENCY THERMAL COAGULATION - LEFT - DR5, S1,S2, AND S3;  Surgeon: Michelle Pineda Jr., MD;  Location: [facility];  Service: Pain Management;  Laterality: Left;    RADIOFREQUENCY THERMOCOAGULATION Left 05/26/2020    Procedure: RADIOFREQUENCY THERMAL COAGULATION--Left DR5+ lateral branches of S1, S2, S3;  Surgeon: Michelle Pineda Jr., MD;  Location: [facility];  Service: Pain Management;  Laterality: Left;    RADIOFREQUENCY THERMOCOAGULATION Right 06/02/2020    Procedure: RADIOFREQUENCY THERMAL COAGULATION--Right DR5+ lateral branches of S1, S2, S3;  Surgeon: Michelle Pineda Jr., MD;  Location: [facility];  Service: Pain Management;  Laterality: Right;    SENTINEL LYMPH NODE BIOPSY Right 05/09/2022    Procedure: BIOPSY, LYMPH NODE, SENTINEL-Right;  Surgeon: NEAL Dhillon MD;  Location: [facility];  Service: General;  Laterality: Right;    SINUS SURGERY      Surgery on right knee  07/09/1982    TONSILLECTOMY      tumor removed from back left side upper shoulder  03/17/2006         Current Outpatient Medications:     albuterol (PROVENTIL) 2.5 mg /3 mL (0.083 %) nebulizer solution, Take 3 mLs (2.5 mg total) by nebulization every 6 (six) hours as needed for Wheezing. Rescue, Disp: 90 each, Rfl: 3    albuterol (PROVENTIL/VENTOLIN HFA) 90 mcg/actuation inhaler, USE 2 INHALATIONS BY MOUTH 4  TIMES DAILY AS NEEDED, Disp: 34 g, Rfl: 3    amLODIPine (NORVASC) 5 MG tablet, Take 1 tablet (5 mg total) by mouth 2 (two) times a day., Disp: 180 tablet, Rfl: 1    ascorbic acid, vitamin C, (VITAMIN C) 500 MG tablet, Take 500 mg by mouth once daily., Disp: , Rfl:     aspirin (ECOTRIN) 81 MG EC tablet, Take 81 mg by mouth once daily., Disp: , Rfl:     atorvastatin (LIPITOR) 80 MG tablet, Take 1 tablet (80 mg total) by mouth once daily., Disp: 90  tablet, Rfl: 3    benztropine (COGENTIN) 0.5 MG tablet, Take 1 tablet (0.5 mg total) by mouth 2 (two) times daily., Disp: 180 tablet, Rfl: 3    exemestane (AROMASIN) 25 mg tablet, Take 1 tablet (25 mg total) by mouth once daily., Disp: 30 tablet, Rfl: 11    fluticasone propionate (FLONASE) 50 mcg/actuation nasal spray, SHAKE LIQUID AND USE 1 SPRAY(50 MCG) IN EACH NOSTRIL DAILY, Disp: 32 g, Rfl: 2    folic acid (FOLVITE) 1 MG tablet, Take 1 tablet (1,000 mcg total) by mouth once daily., Disp: 90 tablet, Rfl: 3    isosorbide mononitrate (IMDUR) 30 MG 24 hr tablet, Take 1 tablet (30 mg total) by mouth once daily., Disp: 90 tablet, Rfl: 3    LIDOcaine (LIDODERM) 5 %, Place 1 patch onto the skin once daily. Remove & Discard patch within 12 hours or as directed by MD, Disp: 30 patch, Rfl: 0    meloxicam (MOBIC) 7.5 MG tablet, Take 1 tablet (7.5 mg total) by mouth once daily., Disp: 14 tablet, Rfl: 0    methotrexate 2.5 MG Tab, Take 8 tablets (20 mg total) by mouth every 7 days. TAKE 4 TABLETS MONDAY MORNING AND TAKE 4 TABLETS MONDAY NIGHT ONLY, Disp: 96 tablet, Rfl: 0    omega-3 fatty acids/fish oil (FISH OIL-OMEGA-3 FATTY ACIDS) 300-1,000 mg capsule, Take by mouth once daily., Disp: , Rfl:     omeprazole (PRILOSEC) 40 MG capsule, TAKE 1 CAPSULE(40 MG) BY MOUTH EVERY MORNING, Disp: 90 capsule, Rfl: 3    pregabalin (LYRICA) 50 MG capsule, TAKE 1 CAPSULE EVERY EVENING, Disp: 30 capsule, Rfl: 2    risperiDONE (RISPERDAL) 2 MG tablet, TAKE 1 TABLET(2 MG) BY MOUTH EVERY MORNING, Disp: 90 tablet, Rfl: 3    risperiDONE (RISPERDAL) 4 MG tablet, Take 1 tablet (4 mg total) by mouth every evening., Disp: 90 tablet, Rfl: 3    traMADoL (ULTRAM) 50 mg tablet, Take 1 tablet (50 mg total) by mouth every 8 (eight) hours as needed for Pain., Disp: 90 each, Rfl: 0    valsartan (DIOVAN) 160 MG tablet, Take 160 mg by mouth once daily. Patient can also take 80mg in the evening, Disp: , Rfl:     venlafaxine (EFFEXOR-XR) 37.5 MG 24 hr capsule,  Take 1 capsule (37.5 mg total) by mouth once daily. Take with 75mg capsule to equal 112.5mg daily., Disp: 90 capsule, Rfl: 3    venlafaxine (EFFEXOR-XR) 75 MG 24 hr capsule, Take 1 capsule (75 mg total) by mouth once daily. Take with 37.5mg capsule to equal 112.5mg daily., Disp: 90 capsule, Rfl: 3    vitamin D (VITAMIN D3) 1000 units Tab, Take 1,000 Units by mouth once daily., Disp: , Rfl:     vitamin E 200 UNIT capsule, Take 200 Units by mouth once daily., Disp: , Rfl:     zinc gluconate 50 mg tablet, Take 50 mg by mouth once daily., Disp: , Rfl:     azelastine (ASTELIN) 137 mcg (0.1 %) nasal spray, 1 spray (137 mcg total) by Nasal route 2 (two) times daily. (Patient not taking: Reported on 1/31/2025), Disp: 30 mL, Rfl: 0    baclofen (LIORESAL) 10 MG tablet, 1 tab po tid for muscle cramps. (Patient not taking: Reported on 1/31/2025), Disp: 60 tablet, Rfl: 1    budesonide-formoterol 80-4.5 mcg (SYMBICORT) 80-4.5 mcg/actuation HFAA, INHALE 2 PUFFS BY MOUTH TWICE DAILY (Patient not taking: Reported on 1/31/2025), Disp: 30.6 g, Rfl: 2    HYDROcodone-acetaminophen (NORCO) 5-325 mg per tablet, Take 1 tablet by mouth every 8 (eight) hours as needed for Pain. (Patient not taking: Reported on 1/31/2025), Disp: 90 tablet, Rfl: 0    immun glob G,IgG,-pro-IgA 0-50 (HIZENTRA) 4 gram/20 mL (20 %) Soln, Inject 60 mLs (12 g total) into the skin every 14 (fourteen) days. (Patient not taking: Reported on 1/31/2025), Disp: 120 mL, Rfl: 11    pregabalin (LYRICA) 25 MG capsule, TAKE 1 CAPSULE BY MOUTH EVERY EVENING (Patient not taking: Reported on 1/31/2025), Disp: 30 capsule, Rfl: 2  No current facility-administered medications for this visit.    Facility-Administered Medications Ordered in Other Visits:     tropicamide 1% ophthalmic solution 1 drop, 1 drop, Right Eye, On Call Procedure, Karen Song MD, 1 drop at 08/01/19 0609    Review of patient's allergies indicates:   Allergen Reactions    Etanercept Other (See Comments)      Other reaction(s): recurrent infections    Chloramphenicol sod succinate Hives    Codeine Other (See Comments)     Other reaction(s): Stomach upset. Pt states OK with Percocet    Nickel sutures [surgical stainless steel] Dermatitis     Allergic contact dermatitis    Adhesive Rash       Family History   Problem Relation Name Age of Onset    Asthma Mother      Colon cancer Mother      Psoriasis Mother      Cancer Mother          colon    Depression Mother      Cancer Father          lymphoma    Stroke Sister      Diabetes Brother x2     Arthritis Brother x2     Heart disease Brother x2         cad    No Known Problems Daughter      Hypertension Neg Hx      Suicide Neg Hx      Amblyopia Neg Hx      Blindness Neg Hx      Cataracts Neg Hx      Glaucoma Neg Hx      Macular degeneration Neg Hx      Retinal detachment Neg Hx      Strabismus Neg Hx      Eczema Neg Hx      Allergies Neg Hx         Social History     Tobacco Use    Smoking status: Every Day     Current packs/day: 0.00     Average packs/day: 0.3 packs/day for 10.0 years (2.5 ttl pk-yrs)     Types: Cigarettes     Start date: 1/10/2013     Last attempt to quit: 1/10/2023     Years since quittin.0     Passive exposure: Never    Smokeless tobacco: Former    Tobacco comments:     about 10 cigs a day 1/15/25   Substance Use Topics    Alcohol use: No    Drug use: No       REVIEW OF SYSTEMS:  General: No chills, fever, malaise, changes in weight  HEENT: No visual changes, difficulty hearing  Cardiovascular: No chest pain, palpitations, claudication  Pulmonary: No dyspnea, cough, wheezing  Gastrointestinal: No nausea, vomiting, diarrhea, constipation  Genitourinary: No dysuria, low urine output, hematuria  Endocrine: No polydipsia, polyphagia  Hematologic: No fatigue with exertion, pica, pallor  Musculoskeletal: No extremity or joint pain, no back pain, no difficulty with ambulation  Neurologic: no seizures, no headaches, no weakness  Psychiatric: no mood  "disturbance      PHYSICAL EXAM:   BP (!) 112/51 (BP Location: Left arm, Patient Position: Sitting)   Pulse 65   Temp 97.5 °F (36.4 °C) (Oral)   Resp 18   Ht 5' 1" (1.549 m)   Wt 45.6 kg (100 lb 8.5 oz)   BMI 18.99 kg/m²   Constitutional:  Alert,   Well-appearing  In no distress.   Neurological: Normal speech  no focal findings   Psychiatric: Mood and affect appropriate and symmetric.   HEENT: Normocephalic / atraumatic  PERRLA  Midline trachea  No scars across the neck   Cardiac: Regular rate and rhythm.   Pulmonary: Normal pulmonary effort.   Abdomen: Soft, not distended.     Skin: Warm and well perfused.    Vascular:  Palpable radial artery pulses, 2+, bilaterally   Extremities/  Musculoskeletal: No edema.   No deformities.      IMAGIN/3/25 Duplex significant for R carotid bulb  cm/s, R ICA/CCA ratio 3.73.     IMPRESSION: Asymptomatic R ICA stenosis, > 70%. Warrants intervention.     PLAN:   Will plan for right carotid endarterectomy on  [facility].   C/w ASA, high dose statin,   Smoking cessation importance reviewed and emphasized > 3 min spent    I have personally taken the history and examined this patient and agree with the resident's note as stated above     ATUL Johnson III, MD, FACS  Professor and Chief, Vascular and Endovascular Surgery    "

## 2025-01-31 NOTE — TELEPHONE ENCOUNTER
Called pt back to see why they felt as if they need an appt as they are not due for 5 more months.

## 2025-01-31 NOTE — TELEPHONE ENCOUNTER
----- Message from Love sent at 1/31/2025 12:28 PM CST -----  Type:  Needs Medical Advice    Who Called: pt    Would the patient rather a call back or a response via [facility]? call  Best Call Back Number: [phone]  Additional Information: pt requesting a call back to discuss lab work orders.

## 2025-02-03 ENCOUNTER — OFFICE VISIT (OUTPATIENT)
Dept: PAIN MEDICINE | Facility: CLINIC | Age: 70
End: 2025-02-03
Payer: MEDICARE

## 2025-02-03 ENCOUNTER — TELEPHONE (OUTPATIENT)
Dept: VASCULAR SURGERY | Facility: CLINIC | Age: 70
End: 2025-02-03
Payer: MEDICARE

## 2025-02-03 DIAGNOSIS — M47.816 LUMBAR SPONDYLOSIS: ICD-10-CM

## 2025-02-03 DIAGNOSIS — M54.17 LUMBOSACRAL RADICULOPATHY: Primary | ICD-10-CM

## 2025-02-03 DIAGNOSIS — G89.4 CHRONIC PAIN SYNDROME: ICD-10-CM

## 2025-02-03 DIAGNOSIS — M51.362 DEGENERATION OF INTERVERTEBRAL DISC OF LUMBAR REGION WITH DISCOGENIC BACK PAIN AND LOWER EXTREMITY PAIN: ICD-10-CM

## 2025-02-03 PROCEDURE — 4010F ACE/ARB THERAPY RXD/TAKEN: CPT | Mod: CPTII,95,, | Performed by: NURSE PRACTITIONER

## 2025-02-03 PROCEDURE — G2211 COMPLEX E/M VISIT ADD ON: HCPCS | Mod: 95,,, | Performed by: NURSE PRACTITIONER

## 2025-02-03 PROCEDURE — 1159F MED LIST DOCD IN RCRD: CPT | Mod: CPTII,95,, | Performed by: NURSE PRACTITIONER

## 2025-02-03 PROCEDURE — 1160F RVW MEDS BY RX/DR IN RCRD: CPT | Mod: CPTII,95,, | Performed by: NURSE PRACTITIONER

## 2025-02-03 PROCEDURE — 98006 SYNCH AUDIO-VIDEO EST MOD 30: CPT | Mod: 95,,, | Performed by: NURSE PRACTITIONER

## 2025-02-03 NOTE — PROGRESS NOTES
[facility] -  Established Patient Evaluation  telemedicine Encounter    Telemedicine Bundle:  The patient location is: patient's home  The chief complaint leading to consultation is: Low-back Pain, Hip Pain (Right), and Leg Pain (Right anterior thigh pain)  Visit type: Virtual visit with synchronous audio and video  Total time spent with patient: 20 minutes   Each patient to whom he or she provides medical services by telemedicine is:    (1) informed of the relationship between the physician and patient and the respective role of any other health care provider with respect to management of the patient  (2) notified that he or she may decline to receive medical services by telemedicine and may withdraw from such care at any time.      Chief Complaint  Chief Complaint   Patient presents with    Low-back Pain    Hip Pain     Right    Leg Pain     Right anterior thigh pain           10/11/2024     2:21 PM 8/30/2024    11:04 AM 6/21/2024    10:43 AM   Last 3 PDI Scores   Pain Disability Index (PDI) 18 35 48       Interval History 2/3/2025:  69-year-old female presents virtually for a follow-up appointment she is status post a caudal SOPHY on 01/29/2025 she reports 90% relief of her left leg and left low back pain however she abruptly is experiencing low back and right hip and right anterior thigh pain that started this morning.  Denies any recent incident or trauma denies any profound weakness she does report paresthesia like symptoms in the right hip and right anterior thigh.  She denies any bowel bladder dysfunction denies saddle anesthesia at this time.      Interval Update 10/11/2024: Patient return to clinic SP Caudal SOPHY procedure done on 09/18/2024 with 100% relief with improved functionality.    Interval update 08/30/24:  Patient return to clinic for follow up on back pain.  She reports most recent right L3/L4 and L4/L5 right-sided transforaminal epidural steroid injection on  07/17/2024 did provide her with pain relief.  Her pain is gradually returning, but she still notes her pain is better than prior to injection.  Pain radiates to the right buttocks and right leg.  She reports pain over the sacrum as well.  She tried the Lyrica 50 mg prescribed by NSGY, but stated the medication sedated her.  She was interested in trying the Lyrica again at a lower dose.  Today she rates her pain 7/10.    Interval Update 06/21/2024: Patient return to clinic for follow up on back pain.  She has status post a lumbar SOPHY targeting L5-S1 on 05/22/2024 reporting 30% relief of her symptoms her daughter who is present with her did report that she was able to walk around her home without using her walker following this injection.  Despite the percentage of relief being low.  Continues to suffer from chronic right low back and right leg pain her pain starts in the low right low back radiating into her right buttocks wrapping around to the front of her right leg into her toes.  She denies any recent incident or trauma denies any falls denies any profound weakness.  She was also previously provided a right-sided sacral lateral branch RFA and was scheduled to have the left side RFA done however her right sciatic pain has taking over her SI joint/hip pain.  I will focus my energy on trying to alleviate her right-sided sciatica type pain.  She denies taking gabapentin anymore as she felt like the medicine was causing her ankles swell.  Today she denies any profound weakness denies any bowel bladder dysfunction denies saddle anesthesia at this time.    Interval History 05/02/2024  Bhavna Landry presents today virtually for follow up her pain back.  She recently underwent right sided sacral lateral branch RFA and was scheduled to have left sided RFA performed, but her sciatica pain has flared up in the interim.  She would like to hold off on the left sacral lateral branch RFA as her right-sided radicular symptoms  are more severe.  Pain radiates down the right lower extremity to the top of the foot.  She needs to use a walker to ambulate due to pain.  She has a history of lumbar radiculopathy and underwent L5/S1 interlaminar SOPHY in 08/2023 with excellent results.  She reports her current radicular symptoms are the same as prior.  She would like to repeat the lumbar epidural steroid injection.  She is currently taking gabapentin which is prescribed by her psychiatrist.  She denies any weakness in the lower extremities, saddle anesthesia, or loss of bowel or bladder function.  She currently rates her pain 8/10.       Interval History 04/02/2024:  Bhavna Landry presents today for follow up of her low back pain.  Her low back/sacral pain has returned.  In the past she has responded well to bilateral L5 Dorsal Ramus and Lateral Branches of S1, S2, and S3 RFA.  This is previously given her significant relief lasting several years.  She would like to repeat this procedure.  Denies any recent falls or trauma.  No changes to her pain.  Denies any radicular symptoms.  No weakness, saddle anesthesia, bowel or bladder changes.     Interval History (05/22/2023):  Bhavna Landry returns today for follow up she is s/p a a diagnostic Lumbar MBB targeting L4/5 and L5/S1 that  provided 0% relief of her low back and leg pain R>L. She would like to consider other injection that may help. She denies and new pain, denies B/B dysfunction, denies any profound weakness.     Current Pain Scales:  Current: 5/10              Typical Range: 5-9/10     Interval History (03/14/2023):  Bhavna Landry returns today for low back pain that radiates into the b/l thighs.  Pain is described as clenching pain that has worsened over last 2 years. No trauma or surgery.  Pain is worse with extension.  Pain is better with heat, lying down, gabapentin, tylenol and tramadol.  She is scheduled to start PT next week.  Currently, the bilateral hip and bilateral leg pain  "is stable.  Avoid NSAIDs due to history of gastric ulcer.     Current Pain Scales:  Current: 7/10              Typical Range: 7-8/10     Background from Chart Review  9/16/2020- Mrs. Landry presents to clinic today reporting left hip pain for the past 3-4 mos, which has not improved with PT.  Pain starts in the lateral left hip "on the bone" and radiates through the buttock, down the lateral thigh not passing the knee, and into the anterolateral thigh.  She endorses regular stretching and states that this pain is distinctly different from the SI pain for which she received SI RFA in the past.     6/09/2020-   64-year-old female presents status post left then right  DR 5+ lateral branches of the S1, S2 and S3 RFA with reported 100% continued  relief she reports that her pain score today is 0/10.  She reports improvement with her ADLs and overall improvement of her functionality.     05/05/2020  presents tele-medicine appointment for a follow-up appointment for low back, left groin and bilateral hip pain.  Since the last visit, Bhavna Landry states the pain has been persistant. Current pain intensity is 9/10.  Patient reports onset of pain 2 weeks, patient states bending forward and sitting for long periods of time increase her pain.  Pain is described as aching.  She reports no radicular symptoms in either leg.  Worst pain is not out of 10.  Patient is status post right and left L5 Dorsal Ramus and Lateral Branches of S1, S2, and S3 (4 levels) RFA reported 80-90% relief for a minimal 6 months.  Pain is now returned and patient like to reschedule procedure.        Treatment History  PT/OT: yes  HEP: yes  TENS:    Injections:  -01/29/2025 Caudal Epidural Steroid Injection 90% relief of left leg and left LBP.  - 09/18/2024Caudal Epidural Steroid Injection 100%  - 07/17/2024-right L3/L4 and L4/L5 transforaminal epidural steroid injection  -05/22/2024 Lumbar Interlaminar Epidural Steroid Injection L5/S1 30%  Bilateral SI " joint injections, bilateral  Dorsal ramus block(DR5, S1,S2,S3),  bilateral L5 Dorsal Ramus and Lateral Branches of S1, S2, and S3 (4 levels) RFA, GTB injections   12/08/2023 - Cervical Interlaminar Epidural Steroid Injection C6/C7 under Fluoroscopic Guidance   08//02/2023 -  Lumbar Interlaminar Epidural Steroid Injection L5/S1   05/03/2023 -  Diagnostic Bilateral L3, L4, and L5 Lumbar Medial Branch Block under Fluoroscopy  - No relief    Surgery:  Medications:   - NSAIDS: avoid due to gastric ulcer   - MSK Relaxants:   - TCAs:   - SNRIs: Venlafaxine  - Topicals: Voltaren  - Opioids: Tramadol   - Anticonvulsants: Gabapentin    Current Pain Medications  Gabapentin  Tramadol      Full Medication List    Current Outpatient Medications:     albuterol (PROVENTIL) 2.5 mg /3 mL (0.083 %) nebulizer solution, Take 3 mLs (2.5 mg total) by nebulization every 6 (six) hours as needed for Wheezing. Rescue, Disp: 90 each, Rfl: 3    albuterol (PROVENTIL/VENTOLIN HFA) 90 mcg/actuation inhaler, USE 2 INHALATIONS BY MOUTH 4  TIMES DAILY AS NEEDED, Disp: 34 g, Rfl: 3    amLODIPine (NORVASC) 5 MG tablet, Take 1 tablet (5 mg total) by mouth 2 (two) times a day., Disp: 180 tablet, Rfl: 1    ascorbic acid, vitamin C, (VITAMIN C) 500 MG tablet, Take 500 mg by mouth once daily., Disp: , Rfl:     aspirin (ECOTRIN) 81 MG EC tablet, Take 81 mg by mouth once daily., Disp: , Rfl:     atorvastatin (LIPITOR) 80 MG tablet, Take 1 tablet (80 mg total) by mouth once daily., Disp: 90 tablet, Rfl: 3    azelastine (ASTELIN) 137 mcg (0.1 %) nasal spray, 1 spray (137 mcg total) by Nasal route 2 (two) times daily. (Patient not taking: Reported on 1/31/2025), Disp: 30 mL, Rfl: 0    baclofen (LIORESAL) 10 MG tablet, 1 tab po tid for muscle cramps. (Patient not taking: Reported on 1/31/2025), Disp: 60 tablet, Rfl: 1    benztropine (COGENTIN) 0.5 MG tablet, Take 1 tablet (0.5 mg total) by mouth 2 (two) times daily., Disp: 180 tablet, Rfl: 3     budesonide-formoterol 80-4.5 mcg (SYMBICORT) 80-4.5 mcg/actuation HFAA, INHALE 2 PUFFS BY MOUTH TWICE DAILY (Patient not taking: Reported on 1/31/2025), Disp: 30.6 g, Rfl: 2    exemestane (AROMASIN) 25 mg tablet, Take 1 tablet (25 mg total) by mouth once daily., Disp: 30 tablet, Rfl: 11    fluticasone propionate (FLONASE) 50 mcg/actuation nasal spray, SHAKE LIQUID AND USE 1 SPRAY(50 MCG) IN EACH NOSTRIL DAILY, Disp: 32 g, Rfl: 2    folic acid (FOLVITE) 1 MG tablet, Take 1 tablet (1,000 mcg total) by mouth once daily., Disp: 90 tablet, Rfl: 3    HYDROcodone-acetaminophen (NORCO) 5-325 mg per tablet, Take 1 tablet by mouth every 8 (eight) hours as needed for Pain. (Patient not taking: Reported on 1/31/2025), Disp: 90 tablet, Rfl: 0    immun glob G,IgG,-pro-IgA 0-50 (HIZENTRA) 4 gram/20 mL (20 %) Soln, Inject 60 mLs (12 g total) into the skin every 14 (fourteen) days. (Patient not taking: Reported on 1/31/2025), Disp: 120 mL, Rfl: 11    isosorbide mononitrate (IMDUR) 30 MG 24 hr tablet, Take 1 tablet (30 mg total) by mouth once daily., Disp: 90 tablet, Rfl: 3    LIDOcaine (LIDODERM) 5 %, Place 1 patch onto the skin once daily. Remove & Discard patch within 12 hours or as directed by MD, Disp: 30 patch, Rfl: 0    meloxicam (MOBIC) 7.5 MG tablet, Take 1 tablet (7.5 mg total) by mouth once daily., Disp: 14 tablet, Rfl: 0    methotrexate 2.5 MG Tab, Take 8 tablets (20 mg total) by mouth every 7 days. TAKE 4 TABLETS MONDAY MORNING AND TAKE 4 TABLETS MONDAY NIGHT ONLY, Disp: 96 tablet, Rfl: 0    omega-3 fatty acids/fish oil (FISH OIL-OMEGA-3 FATTY ACIDS) 300-1,000 mg capsule, Take by mouth once daily., Disp: , Rfl:     omeprazole (PRILOSEC) 40 MG capsule, TAKE 1 CAPSULE(40 MG) BY MOUTH EVERY MORNING, Disp: 90 capsule, Rfl: 3    pregabalin (LYRICA) 25 MG capsule, TAKE 1 CAPSULE BY MOUTH EVERY EVENING (Patient not taking: Reported on 1/31/2025), Disp: 30 capsule, Rfl: 2    pregabalin (LYRICA) 50 MG capsule, TAKE 1 CAPSULE  EVERY EVENING, Disp: 30 capsule, Rfl: 2    risperiDONE (RISPERDAL) 2 MG tablet, TAKE 1 TABLET(2 MG) BY MOUTH EVERY MORNING, Disp: 90 tablet, Rfl: 3    risperiDONE (RISPERDAL) 4 MG tablet, Take 1 tablet (4 mg total) by mouth every evening., Disp: 90 tablet, Rfl: 3    traMADoL (ULTRAM) 50 mg tablet, Take 1 tablet (50 mg total) by mouth every 8 (eight) hours as needed for Pain., Disp: 90 each, Rfl: 0    valsartan (DIOVAN) 160 MG tablet, Take 160 mg by mouth once daily. Patient can also take 80mg in the evening, Disp: , Rfl:     venlafaxine (EFFEXOR-XR) 37.5 MG 24 hr capsule, Take 1 capsule (37.5 mg total) by mouth once daily. Take with 75mg capsule to equal 112.5mg daily., Disp: 90 capsule, Rfl: 3    venlafaxine (EFFEXOR-XR) 75 MG 24 hr capsule, Take 1 capsule (75 mg total) by mouth once daily. Take with 37.5mg capsule to equal 112.5mg daily., Disp: 90 capsule, Rfl: 3    vitamin D (VITAMIN D3) 1000 units Tab, Take 1,000 Units by mouth once daily., Disp: , Rfl:     vitamin E 200 UNIT capsule, Take 200 Units by mouth once daily., Disp: , Rfl:     zinc gluconate 50 mg tablet, Take 50 mg by mouth once daily., Disp: , Rfl:   No current facility-administered medications for this visit.    Facility-Administered Medications Ordered in Other Visits:     tropicamide 1% ophthalmic solution 1 drop, 1 drop, Right Eye, On Call Procedure, Karen Song MD, 1 drop at 08/01/19 0648     Review of Systems  ROS  REVIEW OF SYSTEMS:    GENERAL:  No weight loss, malaise or fevers.  HEENT:   No recent changes in vision or hearing  NECK:  No difficulty with swallowing. No stridor.   RESPIRATORY:  Negative for cough, wheezing or shortness of breath, patient denies any recent URI.  CARDIOVASCULAR:  Negative for chest pain, leg swelling or palpitations. +HTN  GI:  Negative for abdominal discomfort, blood in stools or black stools or change in bowel habits.  MUSCULOSKELETAL:  See HPI.  SKIN:  Negative for lesions, rash, and itching.  PSYCH:   No mood disorder or recent psychosocial stressors.   +anxiety +depression   HEMATOLOGY/LYMPHOLOGY:  Negative for prolonged bleeding, bruising easily or swollen nodes.  Patient is not currently taking any anti-coagulants  NEURO:   No history of headaches, syncope, paralysis, seizures or tremors.  All other reviewed and negative other than HPI.      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, estrogen receptor positive 04/13/2022    Cataract     Depression     Dry eyes     Dry mouth     Duane's syndrome of right eye     Early dry stage nonexudative age-related macular degeneration of both eyes 09/20/2022    Fibromyalgia 04/17/2014    Fibromyalgia     Fractured hip     RIGHT HIP    GERD (gastroesophageal reflux disease)     History of psychiatric hospitalization     Hyperlipidemia 02/02/1992    Hypertension     Hypocalcemia     Hyponatremia     Kidney stone     Migraine headache     Osteoporosis     Pressure ulcer of unspecified site, unspecified stage     Psoriatic arthritis 02/02/1992    Recurrent upper respiratory infection (URI)     Right knee pain     post knee replacement surgery (possible rejectiion of metal)    RLS (restless legs syndrome)     Schizophrenia 02/02/1992    stable on meds    Sciatica     Squamous cell carcinoma of skin     Urinary tract infection         Surgical History  Past Surgical History:   Procedure Laterality Date    brow ptosis repair Right 03/04/2019    Surgeon: Dr. Karla Henson    CATARACT EXTRACTION      CAUDAL EPIDURAL STEROID INJECTION N/A 9/18/2024    Procedure: Caudal SOPHY;  Surgeon: Maile tSorm DO;  Location: [facility] PAIN MANAGEMENT;  Service: Pain Management;  Laterality: N/A;  ASA ok    CAUDAL EPIDURAL STEROID INJECTION N/A 1/29/2025    Procedure: Caudal SOPHY;  Surgeon: Maile Storm DO;  Location: [facility]  PAIN MANAGEMENT;  Service: Pain Management;  Laterality: N/A;  asa ok     SECTION      COLONOSCOPY N/A 2016    Procedure: COLONOSCOPY;  Surgeon: ANDRIA Connelly MD;  [facility]  Service: Endoscopy;  Laterality: N/A;    COLONOSCOPY N/A 2022    Procedure: COLONOSCOPY;  Surgeon: Mikey Walters MD;  Location: [facility] ENDO;  Service: Endoscopy;  Laterality: N/A;    cyst removed from right sinus  1982    EPIDURAL STEROID INJECTION INTO CERVICAL SPINE N/A 2023    Procedure: C6-7 SOPHY;  Surgeon: Spring Jensen MD;  Location: [facility] PAIN MANAGEMENT;  Service: Pain Management;  Laterality: N/A;  20 mins    EPIDURAL STEROID INJECTION INTO LUMBAR SPINE N/A 2023    Procedure: Injection-steroid-epidural-lumbar L5-S1;  Surgeon: Chirag Kim MD;  Location: [facility] PAIN MANAGEMENT;  Service: Pain Management;  Laterality: N/A;  asa    EPIDURAL STEROID INJECTION INTO LUMBAR SPINE N/A 2024    Procedure: L5/S1 Interlaminar SOPHY;  Surgeon: Maile Storm DO;  Location: [facility] PAIN MANAGEMENT;  Service: Pain Management;  Laterality: N/A;  20mins-ASA 5d    ESOPHAGOGASTRODUODENOSCOPY N/A 2023    Procedure: EGD (ESOPHAGOGASTRODUODENOSCOPY);  Surgeon: Dougie Shea MD;  Location: [facility];  Service: Endoscopy;  Laterality: N/A;    FOOT FRACTURE SURGERY      HYSTERECTOMY      VAGINAL HYSTERECTOMY WITHOUT BSO - ENDOMETRIOSIS    INJECTION FOR SENTINEL NODE IDENTIFICATION Right 2022    Procedure: INJECTION, FOR SENTINEL NODE IDENTIFICATION-Right;  Surgeon: NEAL Dhillon MD;  Location: [facility] OR;  Service: General;  Laterality: Right;    INJECTION OF ANESTHETIC AGENT AROUND MEDIAL BRANCH NERVES INNERVATING LUMBAR FACET JOINT N/A 2019    Procedure: Lumbo-sacral Block, DR5 and Lateral Branches of S1,S2, S3;  Surgeon: Michelle Pineda Jr., MD;  Location: [facility] PAIN MGT;  Service: Pain Management;  Laterality: N/A;  Pt takes  and states she holds ASA on her own  whenever she has procedures.  Instructed to hold x 3 days prior to procedure.      INJECTION OF ANESTHETIC AGENT AROUND MEDIAL BRANCH NERVES INNERVATING LUMBAR FACET JOINT Bilateral 05/03/2023    Procedure: Block-nerve-medial branch-lumbar bilateral L3, L4, L5;  Surgeon: Maile Storm DO;  Location: [facility] PAIN MGT;  Service: Pain Management;  Laterality: Bilateral;  asa    INJECTION OF ANESTHETIC AGENT INTO SACROILIAC JOINT Right 08/30/2018    Procedure: BLOCK, SACROILIAC JOINT-Right- ORAL SEDATION;  Surgeon: Michelle Pineda Jr., MD;  Location: [facility] PAIN MGT;  Service: Pain Management;  Laterality: Right;    INJECTION OF ANESTHETIC AGENT INTO SACROILIAC JOINT Bilateral 09/27/2018    Procedure: BLOCK, SACROILIAC JOINT-BILATERAL;  Surgeon: Michelle Pineda Jr., MD;  Location: [facility] PAIN MGT;  Service: Pain Management;  Laterality: Bilateral;  Please keep at 10:00 due to trasnsportation    INJECTION OF ANESTHETIC AGENT INTO SACROILIAC JOINT Bilateral 02/07/2019    Procedure: Bilateral Sacroiliac Joint Injection - Per Dr Pineda, not necessary to hold ASA.;  Surgeon: Michelle Pineda Jr., MD;  Location: [facility] PAIN MGT;  Service: Pain Management;  Laterality: Bilateral;    INJECTION, SPINE, LUMBOSACRAL, TRANSFORAMINAL APPROACH Right 7/17/2024    Procedure: Right  L3/4  and L4/5 TFESI;  Surgeon: Maile Storm DO;  Location: [facility] PAIN MANAGEMENT;  Service: Pain Management;  Laterality: Right;  20 mins  ASA 5 days    INTRAOCULAR PROSTHESES INSERTION Right 08/01/2019    Procedure: INSERTION, IOL PROSTHESIS;  Surgeon: Karen Song MD;  Location: [facility] OR [address] [place] [address];  Service: Ophthalmology;  Laterality: Right;    INTRAOCULAR PROSTHESES INSERTION Left 09/26/2019    Procedure: INSERTION, IOL PROSTHESIS;  Surgeon: Karen Song MD;  Location: [facility] OR [address] [place] [address];  Service: Ophthalmology;  Laterality: Left;    JOINT REPLACEMENT Right     knee    KNEE SURGERY      MASTECTOMY Right 05/09/2022    Procedure:  MASTECTOMY-Right;  Surgeon: NEAL Dhillon MD;  [place] [facility] [place];  Service: General;  Laterality: Right;  2.5 HOURS    PHACOEMULSIFICATION OF CATARACT Right 08/01/2019    Procedure: PHACOEMULSIFICATION, CATARACT;  Surgeon: Karen Song MD;  [facility];  Service: Ophthalmology;  Laterality: Right;    PHACOEMULSIFICATION OF CATARACT Left 09/26/2019    Procedure: PHACOEMULSIFICATION, CATARACT;  Surgeon: Karen Song MD;  [facility];  Service: Ophthalmology;  Laterality: Left;    RADIOFREQUENCY ABLATION, NERVE, PERIPHERAL Right 04/24/2024    Procedure: RIGHT L5 Dorsal Ramus and RIGHT Lateral Branches of S1, S2, and S3;  Surgeon: Maile Storm DO;  Location: [facility] PAIN MANAGEMENT;  Service: Pain Management;  Laterality: Right;  30 mins    RADIOFREQUENCY THERMOCOAGULATION Right 05/07/2019    Procedure: RADIOFREQUENCY THERMAL COAGULATION RIGHT DORSAL RAMUS 5 AND LATERAL BRANCH OF S1, S2 AND S3;  Surgeon: Michelle Pineda Jr., MD;  Location: [facility] PAIN T;  Service: Pain Management;  Laterality: Right;    RADIOFREQUENCY THERMOCOAGULATION Left 05/14/2019    Procedure: RADIOFREQUENCY THERMAL COAGULATION LEFT DORSAL RAMUS 5 AND LATERAL BRANCH OF S1,S2 AND S3;  Surgeon: Michelle Pineda Jr., MD;  Location: [facility] PAIN T;  Service: Pain Management;  Laterality: Left;    RADIOFREQUENCY THERMOCOAGULATION Right 10/22/2019    Procedure: RADIOFREQUENCY THERMAL COAGULATION---DARSAL RAMUS 5 and LATERAL S1,S2,and S3 Right;  Surgeon: Michelle Pineda Jr., MD;  Location: [facility] PAIN MGT;  Service: Pain Management;  Laterality: Right;  patient to sign consent DOS    RADIOFREQUENCY THERMOCOAGULATION Left 10/29/2019    Procedure: RADIOFREQUENCY THERMAL COAGULATION - LEFT - DR5, S1,S2, AND S3;  Surgeon: Michelle Pineda Jr., MD;  Location: [facility];  Service: Pain Management;  Laterality: Left;    RADIOFREQUENCY THERMOCOAGULATION Left 05/26/2020    Procedure: RADIOFREQUENCY THERMAL  COAGULATION--Left DR5+ lateral branches of S1, S2, S3;  Surgeon: Michelle Pineda Jr., MD;  Location: [facility] PAIN MGT;  Service: Pain Management;  Laterality: Left;    RADIOFREQUENCY THERMOCOAGULATION Right 06/02/2020    Procedure: RADIOFREQUENCY THERMAL COAGULATION--Right DR5+ lateral branches of S1, S2, S3;  Surgeon: Michelle Pineda Jr., MD;  Location: [facility] PAIN MGT;  Service: Pain Management;  Laterality: Right;    SENTINEL LYMPH NODE BIOPSY Right 05/09/2022    Procedure: BIOPSY, LYMPH NODE, SENTINEL-Right;  Surgeon: NEAL Dhillon MD;  Location: [facility] OR;  Service: General;  Laterality: Right;    SINUS SURGERY      Surgery on right knee  07/09/1982    TONSILLECTOMY      tumor removed from back left side upper shoulder  03/17/2006        Physical Exam  There were no vitals filed for this visit.    GEN: No acute distress. Calm, comfortable  HENT: Normocephalic, atraumatic, moist mucous membranes  EYE: Anicteric sclera, non-injected.   CV: Non-diaphoretic.   RESP: Breathing comfortably. Chest expansion symmetric.  PSYCH: Pleasant mood and appropriate affect. Recent and remote memory intact.       Ortho/SPM Exam  PHYSICAL EXAMINATION Prior:    GENERAL: Well appearing, in no acute distress, alert and oriented x3.  PSYCH:  Mood and affect appropriate.  SKIN: Skin color, texture, turgor normal, no rashes or lesions.  HEAD/FACE:  Normocephalic, atraumatic. Cranial nerves grossly intact.  NECK: Normal ROM. Supple.   CV: RRR with palpation of the radial artery.  PULM: No evidence of respiratory difficulty, symmetric chest rise.  GI:  Soft and non-distended.  MSK: Straight leg raising is negative to radicular pain. There is pain to palpation over the facet joints of the lumbar spine. There is pain with lumbar facet loading.  Normal range of motion without pain reproduction with lumbar flexion. Pain with extension.  Peripheral joint ROM is full and pain free without obvious instability or laxity in all four extremities. No  deformities, edema, or skin discoloration.  No atrophy or tone abnormalities are noted.   NEURO: Bilateral upper and lower extremity coordination and strength is symmetric.  No loss of sensation is noted.  MENTAL STATUS: A x O x 3, good concentration, speech is fluent and goal directed  MOTOR: 5/5 in all muscle groups  GAIT:  Antalgic..  Ambulates unassisted.    Imaging  MRI LUMBAR SPINE WITHOUT CONTRAST     CLINICAL HISTORY:  Low back pain, symptoms persist with > 6wks conservative treatment; Dorsalgia, unspecified     TECHNIQUE:  Multiplanar, multisequence MR images were acquired from the thoracolumbar junction to the sacrum without the administration of contrast.     COMPARISON:  CT lumbar spine dated 05/31/2024 and MRI lumbar spine dated 03/11/2023     FINDINGS:  Lumbar spine vertebral body heights appear maintained with thoracolumbar levocurvature.  Variable multilevel disc space narrowing and Modic type 1 endplate changes.  The spinal cord terminus lies near L2-L3.  Probable thin fatty filum terminale, similar. Remaining visualized prevertebral and paraspinal soft tissues demonstrate no acute abnormality.     T12-L1: Circumferential bulge without significant spinal canal stenosis or neural foraminal impingement.     L1-L2: Circumferential bulge with facet arthropathy.  Mild flavum buckling and partial flattening of the ventral thecal sac.  Right neural foraminal encroachment.     L2-L3: Circumferential bulge and facet arthropathy.  No significant spinal canal stenosis.  Mild neural foraminal narrowing.     L3-L4: Circumferential bulge with large superimposed left paracentral extrusion with approximate 0.8 cm inferior migration.  Effacement of the left paracentral thecal sac and left lateral recess and severe left neural foraminal narrowing.     L4-L5: Circumferential bulge with superimposed left paracentral extrusion with superior migration of approximately 1.0 cm.  Flavum thickening and mild facet DJD  contributing to mild spinal canal stenosis.  Mild right and moderate left neural foraminal narrowing.     L5-S1: Circumferential bulge and facet DJD with flavum thickening.  Superimposed right subarticular extrusion with inferior migration demonstrating slight variable T2 signal to the parent disc.  Findings contribute to lateral recess narrowing bilaterally with mild-moderate spinal canal stenosis.  Additional abutment and probable encroachment of the right transiting S1 nerve root.  Moderate-severe left and severe right neural foraminal narrowing.     Impression:     Thoracolumbar scoliosis with multilevel lumbar spondylosis.  Detailed findings on a level by level basis as above.        Electronically signed by:Henok Damon  Date:                                            07/16/2024    Labs:  BMP  Lab Results   Component Value Date     (L) 12/27/2024    K 4.0 12/27/2024    CL 99 12/27/2024    CO2 25 12/27/2024    BUN 9 12/27/2024    CREATININE 0.8 12/27/2024    CALCIUM 10.1 12/27/2024    ANIONGAP 11 12/27/2024    EGFRNORACEVR >60 12/27/2024     Lab Results   Component Value Date    ALT 10 12/27/2024    AST 17 12/27/2024    GGT 91 (H) 07/28/2014    ALKPHOS 64 12/27/2024    BILITOT 0.2 12/27/2024       Assessment:  Problem List Items Addressed This Visit    None          03/14/2023 -Bhavna KYM Landry is a 69 y.o. 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, estrogen receptor positive (04/13/2022), Cataract, Depression, Dry eyes, Dry mouth, Duane's syndrome of right eye, Early dry stage nonexudative age-related macular degeneration of both eyes (09/20/2022), Fibromyalgia (04/17/2014), Fibromyalgia, Fractured hip, GERD (gastroesophageal reflux disease), History of psychiatric hospitalization, Hyperlipidemia (02/02/1992), Hypertension, Hypocalcemia, Hyponatremia, Kidney  stone, Migraine headache, Osteoporosis, Pressure ulcer of unspecified site, unspecified stage, Psoriatic arthritis (02/02/1992), Recurrent upper respiratory infection (URI), Right knee pain, RLS (restless legs syndrome), Schizophrenia (02/02/1992), Sciatica, Squamous cell carcinoma of skin, and Urinary tract infection.  By history and examination this patient has chronic low back pain without radiculopathy.  The underlying cause cause is facet arthritis and deconditioning.  Pathology is confirmed by imaging.  We discussed the underlying diagnoses and multiple treatment options including non-opioid medications, interventional procedures, physical therapy, and home exercise.  The risks and benefits of each treatment option were discussed and all questions were answered.      5/22/2023- 68 y/o female with a Hx of chronic low back  and bilateral leg pain she is s/p a diagnosis lumbar MBB targeting L4/5 and L5/S1 reporting 0% relief of her symptoms. Her Lumbar MRI multilevel disc bulges starting   at L3 through S1.  She has severe right, moderate left neural foraminal narrowing at L5-S1 thta may be causing her Low back and leg pain.  Her pain was refractory to diagnositic low lumbar MBB so i will attempt a L4-5 Lumbar SOPHY.     04/02/2024 Bhavna Landry presents for follow up of her low back/SI joint pain.  Patient has previously undergone bilateral L5 Dorsal Ramus and Lateral Branches of S1, S2, and S3 RFA with significant and lasting relief.  Prior RFA lasted about 2 years.  She would like to repeat the procedure as the pain has now returned.    05/02/2024 - Patient presents virtually today for follow up of her low back pain.  Today her biggest complaint is radicular symptoms primarily on the right.  Patient has a history of lumbar radiculopathy and previously underwent an L5/S1 epidural steroid injection in 08/2023 with excellent relief.  Her pain has returned and she would like to repeat the epidural steroid  injection.  She would like to hold off on the left-sided sacral RFA for now until we can address her radicular symptoms as they are more severe.  Discussed with the patient that she may benefit from a surgical consult if epidural does not provide persistent relief.    6/21/2024- 69 y/o female presents for continued pain in the right low back right buttocks and right leg that radiates into her right foot. Her recent L5-S1 IESI provided 30% relief of her symptoms.  Her MRI significant for disc bulges throughout the lower lumbar spine L3 through S1 she has severe right moderate left neural foraminal narrowing at L5-S1 that may be causing right low back and right leg pain.  Today we discussed focusing her pain interventions on the right side in effort to reduce some of the right-sided symptoms as she does not complain about pain in the left lumbar or left leg.    08/30/2024 - patient presents for follow up of her low back pain with right-sided radicular symptoms.  Most recent right transforaminal epidural steroid injection at L3/L4 and L4/L5 did provide her with some relief..  She continues to have pain that radiates into the buttocks and down the leg as well as pain over the sacrum.  We discussed perform a caudal epidural steroid injection.  Patient was amenable to this plan.    [birth year]1-fxpo-old female with a history of chronic low back with right radiculopathy.  Recently provided a caudal SOPHY on 09/18/2024 that provided 100% relief of her symptoms with improved functionality.  Patient has completed physical therapy in the past recommended that she establish a home exercise plan utilizing the exercises she learned in physical therapy focusing on stretching and muscular strengthening to help with lumbar stabilization.    2/3/2025-Ms Landry presented virtually with a subacute pain described as aching throbbing and sharp in her right low back and right hip radiating into her right anterior thigh onset was this  morning.  Etiology of her pain is unclear at this time she was previously taking Lyrica 50 mg  q.h.s. she reports that she is not taking this medication in some time her and I discussed I will optimize her medication and start her on 25 mg q.h.s. gradually increasing this medication to t.i.d. she verbalized understanding of how I want her to take this medication, she denied any previous side effects with it.     Plan & Recommendations  Procedures:  None at this time.   Medications:  Restart Lyrica 25 mg q.h.s. gradually increasing to t.i.d. educated patient on increasing and to discontinue if any adverse side effects appear.  Imaging:  Reviewed  PT/OT/HEP: I encouraged the patient to maintain a home exercise regimen that includes daily, moderate cardiovascular exercise lasting at least 30 minutes.  This may include yoga, gela chi, walking, swimming, aqua aerobics, or other exercises that maintain a heart rate of 50-70% of the calculated maximum heart rate.  I also encouraged light, daily stretching focused on the target area.  Follow Up:  4 weeks to discuss medication.      LUI Brown  [facility]      Disclaimer: This note was partly generated using dictation software which may occasionally result in transcription errors.

## 2025-02-03 NOTE — TELEPHONE ENCOUNTER
Contacted pt in response to message. Appts rescheduled as requested, appt letter placed in mail.----- Message from Jerzy sent at 2/3/2025  9:50 AM CST -----  Regarding: R/S Appt  Contact: [phone]  Who call ? Bhavna Landry     What is the request Details : Pt calling to speak with someone in provider office regards r/s appt and ultrasound on 3/18 to 3/14. States its due to transportation. Please call pt back.       Can clinic  use patient portal  : No     What number to call back : [phone]

## 2025-02-04 RX ORDER — PREGABALIN 25 MG/1
25 CAPSULE ORAL 3 TIMES DAILY
Qty: 90 CAPSULE | Refills: 0 | Status: ON HOLD | OUTPATIENT
Start: 2025-02-04 | End: 2025-02-20

## 2025-02-07 ENCOUNTER — LAB VISIT (OUTPATIENT)
Dept: LAB | Facility: HOSPITAL | Age: 70
End: 2025-02-07
Attending: SURGERY
Payer: MEDICARE

## 2025-02-07 DIAGNOSIS — Z01.818 PRE-OP EVALUATION: ICD-10-CM

## 2025-02-07 LAB
BASOPHILS # BLD AUTO: 0.06 K/UL (ref 0–0.2)
BASOPHILS NFR BLD: 0.9 % (ref 0–1.9)
DIFFERENTIAL METHOD BLD: ABNORMAL
EOSINOPHIL # BLD AUTO: 0.2 K/UL (ref 0–0.5)
EOSINOPHIL NFR BLD: 2.6 % (ref 0–8)
ERYTHROCYTE [DISTWIDTH] IN BLOOD BY AUTOMATED COUNT: 14.4 % (ref 11.5–14.5)
HCT VFR BLD AUTO: 38.3 % (ref 37–48.5)
HGB BLD-MCNC: 12.5 G/DL (ref 12–16)
IMM GRANULOCYTES # BLD AUTO: 0.02 K/UL (ref 0–0.04)
IMM GRANULOCYTES NFR BLD AUTO: 0.3 % (ref 0–0.5)
LYMPHOCYTES # BLD AUTO: 1.9 K/UL (ref 1–4.8)
LYMPHOCYTES NFR BLD: 28.5 % (ref 18–48)
MCH RBC QN AUTO: 31.9 PG (ref 27–31)
MCHC RBC AUTO-ENTMCNC: 32.6 G/DL (ref 32–36)
MCV RBC AUTO: 98 FL (ref 82–98)
MONOCYTES # BLD AUTO: 0.5 K/UL (ref 0.3–1)
MONOCYTES NFR BLD: 7.5 % (ref 4–15)
NEUTROPHILS # BLD AUTO: 4 K/UL (ref 1.8–7.7)
NEUTROPHILS NFR BLD: 60.2 % (ref 38–73)
NRBC BLD-RTO: 0 /100 WBC
PLATELET # BLD AUTO: 367 K/UL (ref 150–450)
PMV BLD AUTO: 8.8 FL (ref 9.2–12.9)
RBC # BLD AUTO: 3.92 M/UL (ref 4–5.4)
WBC # BLD AUTO: 6.57 K/UL (ref 3.9–12.7)

## 2025-02-07 PROCEDURE — 36415 COLL VENOUS BLD VENIPUNCTURE: CPT | Mod: PO | Performed by: SURGERY

## 2025-02-07 PROCEDURE — 85025 COMPLETE CBC W/AUTO DIFF WBC: CPT | Performed by: SURGERY

## 2025-02-07 PROCEDURE — 80048 BASIC METABOLIC PNL TOTAL CA: CPT | Performed by: SURGERY

## 2025-02-08 LAB
ANION GAP SERPL CALC-SCNC: 12 MMOL/L (ref 8–16)
BUN SERPL-MCNC: 6 MG/DL (ref 8–23)
CALCIUM SERPL-MCNC: 9.2 MG/DL (ref 8.7–10.5)
CHLORIDE SERPL-SCNC: 102 MMOL/L (ref 95–110)
CO2 SERPL-SCNC: 21 MMOL/L (ref 23–29)
CREAT SERPL-MCNC: 0.8 MG/DL (ref 0.5–1.4)
EST. GFR  (NO RACE VARIABLE): >60 ML/MIN/1.73 M^2
GLUCOSE SERPL-MCNC: 87 MG/DL (ref 70–110)
POTASSIUM SERPL-SCNC: 4.3 MMOL/L (ref 3.5–5.1)
SODIUM SERPL-SCNC: 135 MMOL/L (ref 136–145)

## 2025-02-11 ENCOUNTER — TELEPHONE (OUTPATIENT)
Dept: CARDIOLOGY | Facility: CLINIC | Age: 70
End: 2025-02-11
Payer: MEDICARE

## 2025-02-11 NOTE — TELEPHONE ENCOUNTER
Returned call to patient.  States she was confused when she called earlier and does not need an appointment with cardiology at this time.  Reviewed the patient's chart.  She is due to see Dr Goel for a yearly visit in September.  Schedule is not open at this time.  There is a recall in for yearly visit.      ----- Message from Mireya sent at 2/11/2025 12:04 PM CST -----  Type:  Patient Returning Call    Who Called: Pt  Who Left Message for Patient:  Does the patient know what this is regarding?: appt  Would the patient rather a call back or a response via [facility]? Call  Best Call Back Number: [phone]   Additional Information: Pt states she received a call from office and call was disconnected.  Pt would like to speak with nurse in office.  Pt states her daughter indicated she does not need an appt with provider after procedure.  Pt states she apologize for the confusion.

## 2025-02-11 NOTE — TELEPHONE ENCOUNTER
Spoke with patient.  Was trying to assist patient with scheduling an appointment.  Patient put me on hold for over 5 minutes.  Hung up to assist another patient.  Will try to call patient again.      ----- Message from Mireya sent at 2/11/2025  9:48 AM CST -----  Type:  Sooner Apoointment Request    Caller is requesting a sooner appointment.  Caller declined first available appointment listed below.  Caller will not accept being placed on the waitlist and is requesting a message be sent to doctor.  Name of Caller: Pt  When is the first available appointment?  Symptoms: f/u  Would the patient rather a call back or a response via [facility]? Call  Best Call Back Number: [phone]  Additional Information: Pt would like to be seen by provider on either of the following day for a f/u appt:  2/28.  Pt states she is having a procedure scheduled for 2/19 and need to be seen after procedure.  Pt was previously scheduled for 2/25 but that date was not convenient for her.  Pt requires an appt date on a Thursday or Friday, due to transportation issues.

## 2025-02-12 ENCOUNTER — PATIENT MESSAGE (OUTPATIENT)
Dept: PAIN MEDICINE | Facility: CLINIC | Age: 70
End: 2025-02-12
Payer: MEDICARE

## 2025-02-17 ENCOUNTER — TELEPHONE (OUTPATIENT)
Dept: HEMATOLOGY/ONCOLOGY | Facility: CLINIC | Age: 70
End: 2025-02-17
Payer: MEDICARE

## 2025-02-17 ENCOUNTER — TELEPHONE (OUTPATIENT)
Dept: VASCULAR SURGERY | Facility: CLINIC | Age: 70
End: 2025-02-17
Payer: MEDICARE

## 2025-02-17 NOTE — TELEPHONE ENCOUNTER
Contacted pt in response to message. After discussing next availability with Dr. Johnson pt decided to keep appts as scheduled.

## 2025-02-17 NOTE — TELEPHONE ENCOUNTER
Called pt.   Moved appts to 4/10 as pt already had an appt here at main campus.     Appt slip printed and mailed.     Pt thankful for call and time.

## 2025-02-17 NOTE — TELEPHONE ENCOUNTER
Contacted pt in response to message. States she was returning call from staff in pre-op dept regarding instructions for surgery on Wednesday 2/19/25. Notified pt that there were no notes left in pt's chart and that she may receive a call again tomorrow. Pt verbalized understanding.----- Message from Med Assistant Salas sent at 2/17/2025  4:00 PM CST -----  Regarding: Call Back  Good evening Can someone from Dr. Johnson give pt a call in regards to her procedure with him on 2/19.Thank You Josue'

## 2025-02-18 ENCOUNTER — TELEPHONE (OUTPATIENT)
Dept: VASCULAR SURGERY | Facility: CLINIC | Age: 70
End: 2025-02-18
Payer: MEDICARE

## 2025-02-18 ENCOUNTER — ANESTHESIA EVENT (OUTPATIENT)
Dept: SURGERY | Facility: HOSPITAL | Age: 70
End: 2025-02-18
Payer: MEDICARE

## 2025-02-18 ENCOUNTER — TELEPHONE (OUTPATIENT)
Dept: HEMATOLOGY/ONCOLOGY | Facility: CLINIC | Age: 70
End: 2025-02-18
Payer: MEDICARE

## 2025-02-18 RX ORDER — PREGABALIN 50 MG/1
50 CAPSULE ORAL 2 TIMES DAILY
Qty: 60 CAPSULE | Refills: 0 | Status: ON HOLD | OUTPATIENT
Start: 2025-02-18 | End: 2025-02-20

## 2025-02-18 NOTE — ANESTHESIA PREPROCEDURE EVALUATION
[facility]  Anesthesia Pre-Operative Evaluation         Patient Name: Bhavna Landry  YOB: 1955  MRN: [id number]    SUBJECTIVE:     Pre-operative evaluation for Procedure(s) (LRB):  ENDARTERECTOMY-CAROTID (Right)     02/18/2025  Bhavna Landry is a 69 y.o. female w/ a significant PMHx of HTN, GERD, schizophrenia, HTN, HLD, cervical spondylolysis, and asymptomatic right carotid artery stenosis >70%.     she has a current medication list which includes the following long-term medication(s): albuterol, albuterol, amlodipine, aspirin, atorvastatin, azelastine, baclofen, benztropine, budesonide-formoterol 80-4.5 mcg, folic acid, isosorbide mononitrate, methotrexate, fish oil-omega-3 fatty acids, omeprazole, pregabalin, pregabalin, risperidone, risperidone, valsartan, venlafaxine, and venlafaxine.     Patient now presents for the above procedure(s).    TTE:  Results for orders placed during the hospital encounter of 07/23/24  Interpretation Summary    Left Ventricle: The left ventricle is normal in size. Normal wall thickness. There is normal systolic function with a visually estimated ejection fraction of 60 - 65%. Grade I diastolic dysfunction.    Right Ventricle: Normal right ventricular cavity size. Systolic function is normal. TAPSE is 1.98 cm.    Aortic Valve: There is mild aortic valve sclerosis. Mildly restricted motion. There is mild stenosis. Aortic valve area by VTI is 1.79 cm². Aortic valve peak velocity is 1.90 m/s. Mean gradient is 7 mmHg. The dimensionless index is 0.63.    The coronary sinus appears dilated.    Tricuspid Valve: There is mild regurgitation.    Pulmonic Valve: There is mild regurgitation.    Pulmonary Artery: The estimated pulmonary artery systolic pressure is 33 mmHg.    IVC/SVC: Intermediate venous pressure at 8 mmHg.    Bilateral Carotid U/s 1/3/25    Severe greater than 70% stenosis of the right carotid bulb extending into the right proximal internal  carotid artery    Calcified nodules noted in the left internal carotid artery associated with less than 50% stenosis.    Antegrade flow bilateral vertebral arteries    LDA: None documented.     Vent/Oxygen: None documented  Drips: None documented.  Previous Airway: Easy mask and grade 1 view on DL 2022  Allergies:  Review of patient's allergies indicates:   Allergen Reactions    Etanercept Other (See Comments)     Other reaction(s): recurrent infections    Chloramphenicol sod succinate Hives    Codeine Other (See Comments)     Other reaction(s): Stomach upset. Pt states OK with Percocet    Nickel sutures [surgical stainless steel] Dermatitis     Allergic contact dermatitis    Adhesive Rash     Medications:     Current Outpatient Medications   Medication Instructions    albuterol (PROVENTIL) 2.5 mg, Nebulization, Every 6 hours PRN, Rescue    albuterol (PROVENTIL/VENTOLIN HFA) 90 mcg/actuation inhaler USE 2 INHALATIONS BY MOUTH 4  TIMES DAILY AS NEEDED    amLODIPine (NORVASC) 5 mg, Oral, 2 times daily    ascorbic acid (vitamin C) (VITAMIN C) 500 mg, Daily    aspirin (ECOTRIN) 81 mg, Daily    atorvastatin (LIPITOR) 80 mg, Oral, Daily    azelastine (ASTELIN) 137 mcg, Nasal, 2 times daily    baclofen (LIORESAL) 10 MG tablet 1 tab po tid for muscle cramps.    benztropine (COGENTIN) 0.5 mg, Oral, 2 times daily    budesonide-formoterol 80-4.5 mcg (SYMBICORT) 80-4.5 mcg/actuation HFAA 2 puffs, Inhalation, 2 times daily    exemestane (AROMASIN) 25 mg, Oral, Daily    fluticasone propionate (FLONASE) 50 mcg/actuation nasal spray SHAKE LIQUID AND USE 1 SPRAY(50 MCG) IN EACH NOSTRIL DAILY    folic acid (FOLVITE) 1,000 mcg, Oral, Daily    HYDROcodone-acetaminophen (NORCO) 5-325 mg per tablet 1 tablet, Oral, Every 8 hours PRN    immun glob G,IgG,-pro-IgA 0-50 (HIZENTRA) 4 gram/20 mL (20 %) Soln 12 g, Subcutaneous, Every 14 days    isosorbide mononitrate (IMDUR) 30 mg, Oral, Daily    LIDOcaine (LIDODERM) 5 % 1 patch, Transdermal,  Daily, Remove & Discard patch within 12 hours or as directed by MD    meloxicam (MOBIC) 7.5 mg, Oral, Daily    methotrexate 20 mg, Oral, Every 7 days, TAKE 4 TABLETS MONDAY MORNING AND TAKE 4 TABLETS MONDAY NIGHT ONLY    omega-3 fatty acids/fish oil (FISH OIL-OMEGA-3 FATTY ACIDS) 300-1,000 mg capsule Daily    omeprazole (PRILOSEC) 40 mg, Oral, Every morning    pregabalin (LYRICA) 25 mg, Oral, 3 times daily    pregabalin (LYRICA) 50 mg, Oral, 2 times daily    risperiDONE (RISPERDAL) 2 MG tablet TAKE 1 TABLET(2 MG) BY MOUTH EVERY MORNING    risperiDONE (RISPERDAL) 4 mg, Oral, Nightly    traMADoL (ULTRAM) 50 mg, Oral, Every 8 hours PRN    valsartan (DIOVAN) 160 mg, Daily    venlafaxine (EFFEXOR-XR) 37.5 mg, Oral, Daily, Take with 75mg capsule to equal 112.5mg daily.    venlafaxine (EFFEXOR-XR) 75 mg, Oral, Daily, Take with 37.5mg capsule to equal 112.5mg daily.    vitamin D (VITAMIN D3) 1,000 Units, Daily    vitamin E 200 Units, Daily    zinc gluconate 50 mg, Daily     Inpatient Medications:    History:   There are no hospital problems to display for this patient.    Medical History:  Past Medical History:   Diagnosis Date    Allergy     Amblyopia     Anemia     Anticoagulant long-term use     Arthritis 02/02/1992    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, estrogen receptor positive 04/13/2022    Cataract     Depression     Dry eyes     Dry mouth     Duane's syndrome of right eye     Early dry stage nonexudative age-related macular degeneration of both eyes 09/20/2022    Fibromyalgia 04/17/2014    Fibromyalgia     Fractured hip     RIGHT HIP    GERD (gastroesophageal reflux disease)     History of psychiatric hospitalization     Hyperlipidemia 02/02/1992    Hypertension     Hypocalcemia     Hyponatremia     Kidney stone     Migraine headache     Osteoporosis     Pressure ulcer of unspecified site, unspecified stage     Psoriatic arthritis 02/02/1992    Recurrent upper respiratory infection (URI)      Right knee pain     post knee replacement surgery (possible rejectiion of metal)    RLS (restless legs syndrome)     Schizophrenia 1992    stable on meds    Sciatica     Squamous cell carcinoma of skin     Urinary tract infection      Surgical History:    has a past surgical history that includes Surgery on right knee (1982); cyst removed from right sinus (1982); tumor removed from back left side upper shoulder (2006); Hysterectomy; Knee surgery;  section; Joint replacement (Right); Colonoscopy (N/A, 2016); Injection of anesthetic agent into sacroiliac joint (Right, 2018); Injection of anesthetic agent into sacroiliac joint (Bilateral, 2018); Injection of anesthetic agent into sacroiliac joint (Bilateral, 2019); brow ptosis repair (Right, 2019); Injection of anesthetic agent around medial branch nerves innervating lumbar facet joint (N/A, 2019); Radiofrequency thermocoagulation (Right, 2019); Radiofrequency thermocoagulation (Left, 2019); Phacoemulsification of cataract (Right, 2019); Intraocular prosthesis insertion (Right, 2019); Cataract extraction; Phacoemulsification of cataract (Left, 2019); Intraocular prosthesis insertion (Left, 2019); Radiofrequency thermocoagulation (Right, 10/22/2019); Radiofrequency thermocoagulation (Left, 10/29/2019); Radiofrequency thermocoagulation (Left, 2020); Radiofrequency thermocoagulation (Right, 2020); Mastectomy (Right, 2022); Wilson lymph node biopsy (Right, 2022); Injection for sentinel node identification (Right, 2022); Colonoscopy (N/A, 2022); Esophagogastroduodenoscopy (N/A, 2023); Injection of anesthetic agent around medial branch nerves innervating lumbar facet joint (Bilateral, 2023); Epidural steroid injection into lumbar spine (N/A, 2023); Epidural steroid injection into cervical spine (N/A, 2023);  Tonsillectomy; Sinus surgery; radiofrequency ablation, nerve, peripheral (Right, 04/24/2024); Foot fracture surgery; Epidural steroid injection into lumbar spine (N/A, 5/22/2024); injection, spine, lumbosacral, transforaminal approach (Right, 7/17/2024); Caudal epidural steroid injection (N/A, 9/18/2024); and Caudal epidural steroid injection (N/A, 1/29/2025).   Social History:    reports that she is not currently sexually active and has had partner(s) who are male. She reports using the following method of birth control/protection: Post-menopausal.  reports that she has been smoking cigarettes. She started smoking about 12 years ago. She has a 2.5 pack-year smoking history. She has never been exposed to tobacco smoke. She has quit using smokeless tobacco. She reports that she does not drink alcohol and does not use drugs.    OBJECTIVE:   Vital Signs Range:  Wt Readings from Last 1 Encounters:   01/31/25 45.6 kg (100 lb 8.5 oz)      BMI Readings from Last 1 Encounters:   01/31/25 18.99 kg/m²     BP Readings from Last 3 Encounters:   01/31/25 (!) 112/51   01/29/25 (!) 102/52   01/15/25 107/61     Pulse Readings from Last 3 Encounters:   01/31/25 65   01/29/25 79   01/15/25 74       Significant Labs:      Component Value Date/Time    WBC 6.57 02/07/2025 1059    HGB 12.5 02/07/2025 1059    HCT 38.3 02/07/2025 1059    HCT 39 08/22/2020 1041     02/07/2025 1059     (L) 02/07/2025 1059    K 4.3 02/07/2025 1059     02/07/2025 1059    CO2 21 (L) 02/07/2025 1059    GLU 87 02/07/2025 1059    BUN 6 (L) 02/07/2025 1059    CREATININE 0.8 02/07/2025 1059    MG 1.7 08/14/2024 0850    PHOS 5.4 (H) 04/03/2023 0712    CALCIUM 9.2 02/07/2025 1059    ALBUMIN 3.9 12/27/2024 1454    PROT 6.7 12/27/2024 1454    ALKPHOS 64 12/27/2024 1454    BILITOT 0.2 12/27/2024 1454    AST 17 12/27/2024 1454    ALT 10 12/27/2024 1454    INR 1.0 02/14/2014 1217    HGBA1C 5.3 12/11/2018 0940      Please see Results Review for additional  labs.     Diagnostic Studies: No relevant studies.    EKG:   Results for orders placed or performed in visit on 01/15/25   IN OFFICE EKG 12-LEAD (to [place])    Collection Time: 01/15/25  5:12 PM   Result Value Ref Range    QRS Duration 80 ms    [facility] QTC Calculation 407 ms    Narrative    Test Reason : I10,I77.9,    Vent. Rate :  67 BPM     Atrial Rate :  67 BPM     P-R Int : 196 ms          QRS Dur :  80 ms      QT Int : 386 ms       P-R-T Axes :  74 -22  81 degrees    QTcB Int : 407 ms    Normal sinus rhythm  Anterior infarct (cited on or before 10-Dec-2018)  Abnormal ECG  When compared with ECG of 27-Dec-2024 14:15,  No significant change was found  Confirmed by Dominic Hendricks ([id number]) on 1/16/2025 4:09:24 PM    Referred By: MILLIE TELLO           Confirmed By: Dominic Hendricks     ECHO:  See subjective, if available.  ASSESSMENT/PLAN:     Pre-op Assessment    I have reviewed the Patient Summary Reports.     I have reviewed the Nursing Notes. I have reviewed the NPO Status.   I have reviewed the Medications.     Review of Systems  Anesthesia Hx:  No problems with previous Anesthesia             Denies Family Hx of Anesthesia complications.    Denies Personal Hx of Anesthesia complications.                    Social:  Smoker       Hematology/Oncology:       -- Denies Anemia:                                  EENT/Dental:  EENT/Dental Normal           Cardiovascular:     Hypertension   Denies MI.      Denies Dysrhythmias.    Denies CHF.    hyperlipidemia    Right Carotid stenosis >70%                           Pulmonary:    Denies COPD.  Denies Asthma.   Recent URI  Denies Sleep Apnea.                Renal/:   Denies Chronic Renal Disease. renal calculi               Hepatic/GI:     GERD Denies Liver Disease.               Musculoskeletal:  Arthritis               Neurological:    Denies CVA. Neuromuscular Disease, (fibromyalgia)  Headaches Denies Seizures.                                Endocrine:  Denies Diabetes.            Psych:  Psychiatric History (schizophrenia)  depression                Physical Exam  General: Well nourished, Cooperative, Alert and Oriented    Airway:  Mallampati: II / I  Mouth Opening: Normal  TM Distance: Normal  Tongue: Normal  Neck ROM: Normal ROM    Dental:  Edentulous    Chest/Lungs:  Normal Respiratory Rate    Heart:  Rate: Normal        Anesthesia Plan  Type of Anesthesia, risks & benefits discussed:    Anesthesia Type: Gen ETT  Intra-op Monitoring Plan: Standard ASA Monitors and Art Line  Post Op Pain Control Plan: multimodal analgesia and IV/PO Opioids PRN  Induction:  IV  Airway Plan: Direct and Video, Post-Induction  Informed Consent: Informed consent signed with the Patient and all parties understand the risks and agree with anesthesia plan.  All questions answered. Patient consented to blood products? Yes  ASA Score: 3  Day of Surgery Review of History & Physical: H&P Update referred to the surgeon/provider.  Anesthesia Plan Notes: Discussed plan for General endotracheal anesthesia    Ready For Surgery From Anesthesia Perspective.     .

## 2025-02-18 NOTE — TELEPHONE ENCOUNTER
----- Message from ELAINE Kolb sent at 2/17/2025  4:40 PM CST -----  Regarding: FW: change appt date  Contact: [phone]  Good afternoon, may you please contact patient to reschedule her appointments. Thank you in advance.  ----- Message -----  From: Julianne Guerrero  Sent: 2/17/2025  11:11 AM CST  To: Luis CAMPOS Staff  Subject: change appt date                                 Pt called regarding wanting to reschedule her appts she has scheduled for 04/09 and have them moved to a Thursday or Friday. Patient would like to be called back as soon as possible  Please call back at [phone]

## 2025-02-18 NOTE — PRE-PROCEDURE INSTRUCTIONS
PreOp Instructions given:   - Verbal medication information (what to hold and what to take)   - NPO guidelines [id number]  - Arrival place directions given; time to be given the day before procedure by the   Surgeon's Office [facility]  - Bathing with antibacterial soap   - Don't wear any jewelry or bring any valuables AM of surgery   - No makeup or moisturizer to face   - No perfume/cologne, powder, lotions or aftershave   Pt. verbalized understanding.   Pt denies any h/o Anesthesia/Sedation complications or side effects.  Patient does not know arrival time.  Explained that this information comes from the surgeon's office and if they haven't heard from them by 2 or 3 pm to call the office.  Patient stated an understanding.

## 2025-02-19 ENCOUNTER — HOSPITAL ENCOUNTER (INPATIENT)
Facility: HOSPITAL | Age: 70
LOS: 1 days | Discharge: HOME | DRG: 039 | End: 2025-02-20
Attending: SURGERY | Admitting: SURGERY
Payer: MEDICARE

## 2025-02-19 ENCOUNTER — ANESTHESIA (OUTPATIENT)
Dept: SURGERY | Facility: HOSPITAL | Age: 70
End: 2025-02-19
Payer: MEDICARE

## 2025-02-19 DIAGNOSIS — I77.9 BILATERAL CAROTID ARTERY DISEASE, UNSPECIFIED TYPE: ICD-10-CM

## 2025-02-19 DIAGNOSIS — I65.21 CAROTID ARTERY STENOSIS, ASYMPTOMATIC, RIGHT: Primary | ICD-10-CM

## 2025-02-19 LAB
POC ACTIVATED CLOTTING TIME K: 118 SEC (ref 74–137)
POC ACTIVATED CLOTTING TIME K: 204 SEC (ref 74–137)
POC ACTIVATED CLOTTING TIME K: 239 SEC (ref 74–137)
POC ACTIVATED CLOTTING TIME K: 273 SEC (ref 74–137)
SAMPLE: ABNORMAL
SAMPLE: NORMAL

## 2025-02-19 PROCEDURE — 36000706: Performed by: SURGERY

## 2025-02-19 PROCEDURE — 94640 AIRWAY INHALATION TREATMENT: CPT

## 2025-02-19 PROCEDURE — 25000003 PHARM REV CODE 250: Performed by: STUDENT IN AN ORGANIZED HEALTH CARE EDUCATION/TRAINING PROGRAM

## 2025-02-19 PROCEDURE — 35301 RECHANNELING OF ARTERY: CPT | Mod: RT,,, | Performed by: SURGERY

## 2025-02-19 PROCEDURE — 99900035 HC TECH TIME PER 15 MIN (STAT)

## 2025-02-19 PROCEDURE — 94761 N-INVAS EAR/PLS OXIMETRY MLT: CPT

## 2025-02-19 PROCEDURE — C1768 GRAFT, VASCULAR: HCPCS | Performed by: SURGERY

## 2025-02-19 PROCEDURE — 27000221 HC OXYGEN, UP TO 24 HOURS

## 2025-02-19 PROCEDURE — 71000039 HC RECOVERY, EACH ADD'L HOUR: Performed by: SURGERY

## 2025-02-19 PROCEDURE — 20000000 HC ICU ROOM

## 2025-02-19 PROCEDURE — 25000003 PHARM REV CODE 250

## 2025-02-19 PROCEDURE — 63600175 PHARM REV CODE 636 W HCPCS: Performed by: SURGERY

## 2025-02-19 PROCEDURE — 71000033 HC RECOVERY, INTIAL HOUR: Performed by: SURGERY

## 2025-02-19 PROCEDURE — 25000242 PHARM REV CODE 250 ALT 637 W/ HCPCS: Performed by: STUDENT IN AN ORGANIZED HEALTH CARE EDUCATION/TRAINING PROGRAM

## 2025-02-19 PROCEDURE — 63600175 PHARM REV CODE 636 W HCPCS: Performed by: STUDENT IN AN ORGANIZED HEALTH CARE EDUCATION/TRAINING PROGRAM

## 2025-02-19 PROCEDURE — 37000009 HC ANESTHESIA EA ADD 15 MINS: Performed by: SURGERY

## 2025-02-19 PROCEDURE — 27201423 OPTIME MED/SURG SUP & DEVICES STERILE SUPPLY: Performed by: SURGERY

## 2025-02-19 PROCEDURE — 94799 UNLISTED PULMONARY SVC/PX: CPT

## 2025-02-19 PROCEDURE — 03CK0ZZ EXTIRPATION OF MATTER FROM RIGHT INTERNAL CAROTID ARTERY, OPEN APPROACH: ICD-10-PCS | Performed by: SURGERY

## 2025-02-19 PROCEDURE — 63600175 PHARM REV CODE 636 W HCPCS

## 2025-02-19 PROCEDURE — 03UK0KZ SUPPLEMENT RIGHT INTERNAL CAROTID ARTERY WITH NONAUTOLOGOUS TISSUE SUBSTITUTE, OPEN APPROACH: ICD-10-PCS | Performed by: SURGERY

## 2025-02-19 PROCEDURE — 25000003 PHARM REV CODE 250: Performed by: SURGERY

## 2025-02-19 PROCEDURE — 36000707: Performed by: SURGERY

## 2025-02-19 PROCEDURE — 37000008 HC ANESTHESIA 1ST 15 MINUTES: Performed by: SURGERY

## 2025-02-19 DEVICE — GRAFT VAS GUARD 0.8X8CM BOVINE: Type: IMPLANTABLE DEVICE | Site: CAROTID | Status: FUNCTIONAL

## 2025-02-19 RX ORDER — AMLODIPINE BESYLATE 5 MG/1
5 TABLET ORAL 2 TIMES DAILY
Status: DISCONTINUED | OUTPATIENT
Start: 2025-02-19 | End: 2025-02-20

## 2025-02-19 RX ORDER — PREGABALIN 50 MG/1
50 CAPSULE ORAL 2 TIMES DAILY
Status: DISCONTINUED | OUTPATIENT
Start: 2025-02-19 | End: 2025-02-19

## 2025-02-19 RX ORDER — CEFAZOLIN SODIUM 1 G/3ML
INJECTION, POWDER, FOR SOLUTION INTRAMUSCULAR; INTRAVENOUS
Status: DISCONTINUED | OUTPATIENT
Start: 2025-02-19 | End: 2025-02-19

## 2025-02-19 RX ORDER — BENZTROPINE MESYLATE 0.5 MG/1
0.5 TABLET ORAL 2 TIMES DAILY
Status: DISCONTINUED | OUTPATIENT
Start: 2025-02-19 | End: 2025-02-20 | Stop reason: HOSPADM

## 2025-02-19 RX ORDER — PREGABALIN 25 MG/1
25 CAPSULE ORAL 2 TIMES DAILY
Status: DISCONTINUED | OUTPATIENT
Start: 2025-02-19 | End: 2025-02-20 | Stop reason: HOSPADM

## 2025-02-19 RX ORDER — POLYETHYLENE GLYCOL 3350 17 G/17G
17 POWDER, FOR SOLUTION ORAL DAILY
Status: DISCONTINUED | OUTPATIENT
Start: 2025-02-20 | End: 2025-02-20 | Stop reason: HOSPADM

## 2025-02-19 RX ORDER — RISPERIDONE 1 MG/1
4 TABLET ORAL NIGHTLY
Status: DISCONTINUED | OUTPATIENT
Start: 2025-02-19 | End: 2025-02-20 | Stop reason: HOSPADM

## 2025-02-19 RX ORDER — FENTANYL CITRATE 50 UG/ML
INJECTION, SOLUTION INTRAMUSCULAR; INTRAVENOUS
Status: DISCONTINUED | OUTPATIENT
Start: 2025-02-19 | End: 2025-02-19

## 2025-02-19 RX ORDER — ALBUTEROL SULFATE 90 UG/1
1 INHALANT RESPIRATORY (INHALATION) EVERY 4 HOURS PRN
Status: DISCONTINUED | OUTPATIENT
Start: 2025-02-19 | End: 2025-02-20 | Stop reason: HOSPADM

## 2025-02-19 RX ORDER — DEXAMETHASONE SODIUM PHOSPHATE 4 MG/ML
INJECTION, SOLUTION INTRA-ARTICULAR; INTRALESIONAL; INTRAMUSCULAR; INTRAVENOUS; SOFT TISSUE
Status: DISCONTINUED | OUTPATIENT
Start: 2025-02-19 | End: 2025-02-19

## 2025-02-19 RX ORDER — HEPARIN SODIUM 5000 [USP'U]/ML
5000 INJECTION, SOLUTION INTRAVENOUS; SUBCUTANEOUS EVERY 8 HOURS
Status: DISCONTINUED | OUTPATIENT
Start: 2025-02-19 | End: 2025-02-20

## 2025-02-19 RX ORDER — GLUCAGON 1 MG
1 KIT INJECTION
Status: DISCONTINUED | OUTPATIENT
Start: 2025-02-19 | End: 2025-02-19 | Stop reason: HOSPADM

## 2025-02-19 RX ORDER — ONDANSETRON HYDROCHLORIDE 2 MG/ML
INJECTION, SOLUTION INTRAVENOUS
Status: DISCONTINUED | OUTPATIENT
Start: 2025-02-19 | End: 2025-02-19

## 2025-02-19 RX ORDER — ASPIRIN 81 MG/1
81 TABLET ORAL DAILY
Status: DISCONTINUED | OUTPATIENT
Start: 2025-02-20 | End: 2025-02-20 | Stop reason: HOSPADM

## 2025-02-19 RX ORDER — OXYCODONE HYDROCHLORIDE 5 MG/1
5 TABLET ORAL EVERY 4 HOURS PRN
Refills: 0 | Status: DISCONTINUED | OUTPATIENT
Start: 2025-02-19 | End: 2025-02-20 | Stop reason: HOSPADM

## 2025-02-19 RX ORDER — HEPARIN SOD,PORCINE/0.9 % NACL 1000/500ML
INTRAVENOUS SOLUTION INTRAVENOUS
Status: DISCONTINUED | OUTPATIENT
Start: 2025-02-19 | End: 2025-02-19 | Stop reason: HOSPADM

## 2025-02-19 RX ORDER — MUPIROCIN 20 MG/G
OINTMENT TOPICAL 2 TIMES DAILY
Status: DISCONTINUED | OUTPATIENT
Start: 2025-02-19 | End: 2025-02-20 | Stop reason: HOSPADM

## 2025-02-19 RX ORDER — ACETAMINOPHEN 325 MG/1
650 TABLET ORAL EVERY 6 HOURS
Status: DISCONTINUED | OUTPATIENT
Start: 2025-02-19 | End: 2025-02-20 | Stop reason: HOSPADM

## 2025-02-19 RX ORDER — SODIUM CHLORIDE 9 MG/ML
INJECTION, SOLUTION INTRAVENOUS CONTINUOUS
Status: DISCONTINUED | OUTPATIENT
Start: 2025-02-19 | End: 2025-02-19

## 2025-02-19 RX ORDER — FENTANYL CITRATE 50 UG/ML
25 INJECTION, SOLUTION INTRAMUSCULAR; INTRAVENOUS EVERY 5 MIN PRN
Status: DISCONTINUED | OUTPATIENT
Start: 2025-02-19 | End: 2025-02-19 | Stop reason: HOSPADM

## 2025-02-19 RX ORDER — VASOPRESSIN 20 [USP'U]/ML
INJECTION, SOLUTION INTRAMUSCULAR; SUBCUTANEOUS
Status: DISCONTINUED | OUTPATIENT
Start: 2025-02-19 | End: 2025-02-19

## 2025-02-19 RX ORDER — AMOXICILLIN 250 MG
1 CAPSULE ORAL DAILY
Status: DISCONTINUED | OUTPATIENT
Start: 2025-02-20 | End: 2025-02-20 | Stop reason: HOSPADM

## 2025-02-19 RX ORDER — ONDANSETRON HYDROCHLORIDE 2 MG/ML
4 INJECTION, SOLUTION INTRAVENOUS DAILY PRN
Status: DISCONTINUED | OUTPATIENT
Start: 2025-02-19 | End: 2025-02-19 | Stop reason: HOSPADM

## 2025-02-19 RX ORDER — PROPOFOL 10 MG/ML
VIAL (ML) INTRAVENOUS
Status: DISCONTINUED | OUTPATIENT
Start: 2025-02-19 | End: 2025-02-19

## 2025-02-19 RX ORDER — CEFAZOLIN 2 G/1
2 INJECTION, POWDER, FOR SOLUTION INTRAMUSCULAR; INTRAVENOUS
Status: COMPLETED | OUTPATIENT
Start: 2025-02-19 | End: 2025-02-19

## 2025-02-19 RX ORDER — NOREPINEPHRINE BITARTRATE 1 MG/ML
INJECTION, SOLUTION INTRAVENOUS
Status: DISCONTINUED | OUTPATIENT
Start: 2025-02-19 | End: 2025-02-19

## 2025-02-19 RX ORDER — PHENYLEPHRINE HYDROCHLORIDE 10 MG/ML
INJECTION INTRAVENOUS
Status: DISCONTINUED | OUTPATIENT
Start: 2025-02-19 | End: 2025-02-19

## 2025-02-19 RX ORDER — SODIUM CHLORIDE 0.9 % (FLUSH) 0.9 %
10 SYRINGE (ML) INJECTION
Status: DISCONTINUED | OUTPATIENT
Start: 2025-02-19 | End: 2025-02-19 | Stop reason: HOSPADM

## 2025-02-19 RX ORDER — ROCURONIUM BROMIDE 10 MG/ML
INJECTION, SOLUTION INTRAVENOUS
Status: DISCONTINUED | OUTPATIENT
Start: 2025-02-19 | End: 2025-02-19

## 2025-02-19 RX ORDER — LIDOCAINE HYDROCHLORIDE 20 MG/ML
INJECTION, SOLUTION EPIDURAL; INFILTRATION; INTRACAUDAL; PERINEURAL
Status: DISCONTINUED | OUTPATIENT
Start: 2025-02-19 | End: 2025-02-19

## 2025-02-19 RX ORDER — ISOSORBIDE MONONITRATE 30 MG/1
30 TABLET, EXTENDED RELEASE ORAL DAILY
Status: DISCONTINUED | OUTPATIENT
Start: 2025-02-19 | End: 2025-02-20 | Stop reason: HOSPADM

## 2025-02-19 RX ORDER — HEPARIN SODIUM 1000 [USP'U]/ML
INJECTION, SOLUTION INTRAVENOUS; SUBCUTANEOUS
Status: DISCONTINUED | OUTPATIENT
Start: 2025-02-19 | End: 2025-02-19

## 2025-02-19 RX ORDER — VENLAFAXINE HYDROCHLORIDE 37.5 MG/1
75 CAPSULE, EXTENDED RELEASE ORAL DAILY
Status: DISCONTINUED | OUTPATIENT
Start: 2025-02-20 | End: 2025-02-20 | Stop reason: HOSPADM

## 2025-02-19 RX ORDER — EPHEDRINE SULFATE 50 MG/ML
INJECTION, SOLUTION INTRAVENOUS
Status: DISCONTINUED | OUTPATIENT
Start: 2025-02-19 | End: 2025-02-19

## 2025-02-19 RX ORDER — PROTAMINE SULFATE 10 MG/ML
INJECTION, SOLUTION INTRAVENOUS
Status: DISCONTINUED | OUTPATIENT
Start: 2025-02-19 | End: 2025-02-19

## 2025-02-19 RX ORDER — VENLAFAXINE HYDROCHLORIDE 37.5 MG/1
37.5 CAPSULE, EXTENDED RELEASE ORAL NIGHTLY
Status: DISCONTINUED | OUTPATIENT
Start: 2025-02-19 | End: 2025-02-20 | Stop reason: HOSPADM

## 2025-02-19 RX ORDER — ALBUTEROL SULFATE 2.5 MG/.5ML
2.5 SOLUTION RESPIRATORY (INHALATION) EVERY 4 HOURS
Status: DISCONTINUED | OUTPATIENT
Start: 2025-02-19 | End: 2025-02-20 | Stop reason: HOSPADM

## 2025-02-19 RX ORDER — CEFAZOLIN 2 G/1
2 INJECTION, POWDER, FOR SOLUTION INTRAMUSCULAR; INTRAVENOUS
Status: DISCONTINUED | OUTPATIENT
Start: 2025-02-19 | End: 2025-02-19

## 2025-02-19 RX ORDER — ATORVASTATIN CALCIUM 40 MG/1
80 TABLET, FILM COATED ORAL NIGHTLY
Status: DISCONTINUED | OUTPATIENT
Start: 2025-02-19 | End: 2025-02-20 | Stop reason: HOSPADM

## 2025-02-19 RX ADMIN — VENLAFAXINE HYDROCHLORIDE 37.5 MG: 37.5 CAPSULE, EXTENDED RELEASE ORAL at 08:02

## 2025-02-19 RX ADMIN — SODIUM CHLORIDE, POTASSIUM CHLORIDE, SODIUM LACTATE AND CALCIUM CHLORIDE 500 ML: 600; 310; 30; 20 INJECTION, SOLUTION INTRAVENOUS at 07:02

## 2025-02-19 RX ADMIN — RISPERIDONE 4 MG: 1 TABLET, FILM COATED ORAL at 08:02

## 2025-02-19 RX ADMIN — HEPARIN SODIUM 5000 UNITS: 1000 INJECTION, SOLUTION INTRAVENOUS; SUBCUTANEOUS at 09:02

## 2025-02-19 RX ADMIN — PHENYLEPHRINE HYDROCHLORIDE 200 MCG: 10 INJECTION INTRAVENOUS at 08:02

## 2025-02-19 RX ADMIN — SODIUM CHLORIDE, SODIUM GLUCONATE, SODIUM ACETATE, POTASSIUM CHLORIDE, MAGNESIUM CHLORIDE, SODIUM PHOSPHATE, DIBASIC, AND POTASSIUM PHOSPHATE: .53; .5; .37; .037; .03; .012; .00082 INJECTION, SOLUTION INTRAVENOUS at 09:02

## 2025-02-19 RX ADMIN — SODIUM CHLORIDE 500 ML: 9 INJECTION, SOLUTION INTRAVENOUS at 02:02

## 2025-02-19 RX ADMIN — VASOPRESSIN 1 UNITS: 20 INJECTION INTRAVENOUS at 08:02

## 2025-02-19 RX ADMIN — ROCURONIUM BROMIDE 20 MG: 10 INJECTION, SOLUTION INTRAVENOUS at 08:02

## 2025-02-19 RX ADMIN — SUGAMMADEX 200 MG: 100 INJECTION, SOLUTION INTRAVENOUS at 10:02

## 2025-02-19 RX ADMIN — VASOPRESSIN 2 UNITS: 20 INJECTION INTRAVENOUS at 09:02

## 2025-02-19 RX ADMIN — LIDOCAINE HYDROCHLORIDE 60 MG: 20 INJECTION, SOLUTION EPIDURAL; INFILTRATION; INTRACAUDAL; PERINEURAL at 08:02

## 2025-02-19 RX ADMIN — DEXAMETHASONE SODIUM PHOSPHATE 4 MG: 4 INJECTION, SOLUTION INTRAMUSCULAR; INTRAVENOUS at 08:02

## 2025-02-19 RX ADMIN — PROPOFOL 60 MG: 10 INJECTION, EMULSION INTRAVENOUS at 08:02

## 2025-02-19 RX ADMIN — ACETAMINOPHEN 650 MG: 325 TABLET ORAL at 06:02

## 2025-02-19 RX ADMIN — EPHEDRINE SULFATE 10 MG: 50 INJECTION INTRAVENOUS at 09:02

## 2025-02-19 RX ADMIN — CEFAZOLIN 2 G: 2 INJECTION, POWDER, FOR SOLUTION INTRAMUSCULAR; INTRAVENOUS at 04:02

## 2025-02-19 RX ADMIN — ACETAMINOPHEN 650 MG: 325 TABLET ORAL at 12:02

## 2025-02-19 RX ADMIN — ROCURONIUM BROMIDE 50 MG: 10 INJECTION, SOLUTION INTRAVENOUS at 08:02

## 2025-02-19 RX ADMIN — ALBUTEROL SULFATE 2.5 MG: 2.5 SOLUTION RESPIRATORY (INHALATION) at 09:02

## 2025-02-19 RX ADMIN — ALBUTEROL SULFATE 2.5 MG: 2.5 SOLUTION RESPIRATORY (INHALATION) at 11:02

## 2025-02-19 RX ADMIN — PREGABALIN 25 MG: 25 CAPSULE ORAL at 12:02

## 2025-02-19 RX ADMIN — EPHEDRINE SULFATE 5 MG: 50 INJECTION INTRAVENOUS at 09:02

## 2025-02-19 RX ADMIN — CEFAZOLIN 2 G: 2 INJECTION, POWDER, FOR SOLUTION INTRAMUSCULAR; INTRAVENOUS at 11:02

## 2025-02-19 RX ADMIN — ONDANSETRON 4 MG: 2 INJECTION INTRAMUSCULAR; INTRAVENOUS at 10:02

## 2025-02-19 RX ADMIN — EPHEDRINE SULFATE 5 MG: 50 INJECTION INTRAVENOUS at 10:02

## 2025-02-19 RX ADMIN — NOREPINEPHRINE BITARTRATE 16 MCG: 1 INJECTION, SOLUTION, CONCENTRATE INTRAVENOUS at 10:02

## 2025-02-19 RX ADMIN — CEFAZOLIN 2 G: 330 INJECTION, POWDER, FOR SOLUTION INTRAMUSCULAR; INTRAVENOUS at 08:02

## 2025-02-19 RX ADMIN — EPHEDRINE SULFATE 10 MG: 50 INJECTION INTRAVENOUS at 10:02

## 2025-02-19 RX ADMIN — SODIUM CHLORIDE: 9 INJECTION, SOLUTION INTRAVENOUS at 06:02

## 2025-02-19 RX ADMIN — HEPARIN SODIUM 5000 UNITS: 5000 INJECTION INTRAVENOUS; SUBCUTANEOUS at 09:02

## 2025-02-19 RX ADMIN — VASOPRESSIN 1 UNITS: 20 INJECTION INTRAVENOUS at 09:02

## 2025-02-19 RX ADMIN — VASOPRESSIN 2 UNITS: 20 INJECTION INTRAVENOUS at 08:02

## 2025-02-19 RX ADMIN — ROCURONIUM BROMIDE 10 MG: 10 INJECTION, SOLUTION INTRAVENOUS at 10:02

## 2025-02-19 RX ADMIN — OXYCODONE 5 MG: 5 TABLET ORAL at 12:02

## 2025-02-19 RX ADMIN — PHENYLEPHRINE HYDROCHLORIDE 0.5 MCG/KG/MIN: 10 INJECTION INTRAVENOUS at 08:02

## 2025-02-19 RX ADMIN — FENTANYL CITRATE 75 MCG: 50 INJECTION, SOLUTION INTRAMUSCULAR; INTRAVENOUS at 08:02

## 2025-02-19 RX ADMIN — ACETAMINOPHEN 650 MG: 325 TABLET ORAL at 11:02

## 2025-02-19 RX ADMIN — PROTAMINE SULFATE 65 MG: 10 INJECTION, SOLUTION INTRAVENOUS at 10:02

## 2025-02-19 RX ADMIN — HEPARIN SODIUM 1000 UNITS: 1000 INJECTION, SOLUTION INTRAVENOUS; SUBCUTANEOUS at 09:02

## 2025-02-19 RX ADMIN — HEPARIN SODIUM 3000 UNITS: 1000 INJECTION, SOLUTION INTRAVENOUS; SUBCUTANEOUS at 09:02

## 2025-02-19 RX ADMIN — SODIUM CHLORIDE 0.1 MCG/KG/MIN: 9 INJECTION, SOLUTION INTRAVENOUS at 08:02

## 2025-02-19 RX ADMIN — PROTAMINE SULFATE 5 MG: 10 INJECTION, SOLUTION INTRAVENOUS at 10:02

## 2025-02-19 RX ADMIN — SODIUM CHLORIDE: 0.9 INJECTION, SOLUTION INTRAVENOUS at 08:02

## 2025-02-19 RX ADMIN — BENZTROPINE MESYLATE 0.5 MG: 0.5 TABLET ORAL at 01:02

## 2025-02-19 RX ADMIN — MUPIROCIN: 20 OINTMENT TOPICAL at 09:02

## 2025-02-19 RX ADMIN — ROCURONIUM BROMIDE 20 MG: 10 INJECTION, SOLUTION INTRAVENOUS at 09:02

## 2025-02-19 RX ADMIN — HEPARIN SODIUM 5000 UNITS: 5000 INJECTION INTRAVENOUS; SUBCUTANEOUS at 02:02

## 2025-02-19 RX ADMIN — PROPOFOL 50 MG: 10 INJECTION, EMULSION INTRAVENOUS at 08:02

## 2025-02-19 RX ADMIN — ATORVASTATIN CALCIUM 80 MG: 40 TABLET, FILM COATED ORAL at 08:02

## 2025-02-19 RX ADMIN — ALBUTEROL SULFATE 2.5 MG: 2.5 SOLUTION RESPIRATORY (INHALATION) at 04:02

## 2025-02-19 RX ADMIN — VASOPRESSIN 4 UNITS: 20 INJECTION INTRAVENOUS at 10:02

## 2025-02-19 RX ADMIN — PHENYLEPHRINE HYDROCHLORIDE 100 MCG: 10 INJECTION INTRAVENOUS at 08:02

## 2025-02-19 RX ADMIN — VASOPRESSIN 4 UNITS: 20 INJECTION INTRAVENOUS at 09:02

## 2025-02-19 RX ADMIN — BENZTROPINE MESYLATE 0.5 MG: 0.5 TABLET ORAL at 09:02

## 2025-02-19 RX ADMIN — PREGABALIN 25 MG: 25 CAPSULE ORAL at 08:02

## 2025-02-19 NOTE — SUBJECTIVE & OBJECTIVE
Follow-up For: Procedure(s) (LRB):  ENDARTERECTOMY-CAROTID (Right)    Post-Operative Day: * Day of Surgery *     Past Medical History:   Diagnosis Date    Allergy     Amblyopia     Anemia     Anticoagulant long-term use     Arthritis 1992    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, estrogen receptor positive 2022    Cataract     Depression     Dry eyes     Dry mouth     Duane's syndrome of right eye     Early dry stage nonexudative age-related macular degeneration of both eyes 2022    Fibromyalgia 2014    Fibromyalgia     Fractured hip     RIGHT HIP    GERD (gastroesophageal reflux disease)     History of psychiatric hospitalization     Hyperlipidemia 1992    Hypertension     Hypocalcemia     Hyponatremia     Kidney stone     Migraine headache     Osteoporosis     Pressure ulcer of unspecified site, unspecified stage     Psoriatic arthritis 1992    Recurrent upper respiratory infection (URI)     Right knee pain     post knee replacement surgery (possible rejectiion of metal)    RLS (restless legs syndrome)     Schizophrenia 1992    stable on meds    Sciatica     Squamous cell carcinoma of skin     Urinary tract infection        Past Surgical History:   Procedure Laterality Date    brow ptosis repair Right 2019    Surgeon: Dr. Karla Henson    CATARACT EXTRACTION      CAUDAL EPIDURAL STEROID INJECTION N/A 2024    Procedure: Caudal SOPHY;  Surgeon: Maile Storm DO;  Location: [facility] PAIN MANAGEMENT;  Service: Pain Management;  Laterality: N/A;  ASA ok    CAUDAL EPIDURAL STEROID INJECTION N/A 2025    Procedure: Caudal SOPHY;  Surgeon: Maile Storm DO;  Location: [facility] PAIN MANAGEMENT;  Service: Pain Management;  Laterality: N/A;  asa ok     SECTION      COLONOSCOPY N/A 2016    Procedure: COLONOSCOPY;  Surgeon: ANDRIA Connelly MD;  Location: [facility] ENDO [address]);  Service: Endoscopy;  Laterality: N/A;    COLONOSCOPY N/A  12/09/2022    Procedure: COLONOSCOPY;  Surgeon: Mikey Walters MD;  Location: [facility];  Service: Endoscopy;  Laterality: N/A;    cyst removed from right sinus  07/09/1982    EPIDURAL STEROID INJECTION INTO CERVICAL SPINE N/A 12/08/2023    Procedure: C6-7 SOPHY;  Surgeon: Spring Jensen MD;  Location: [facility] PAIN MANAGEMENT;  Service: Pain Management;  Laterality: N/A;  20 mins    EPIDURAL STEROID INJECTION INTO LUMBAR SPINE N/A 08/02/2023    Procedure: Injection-steroid-epidural-lumbar L5-S1;  Surgeon: Chirag Kim MD;  Location: [facility] PAIN MANAGEMENT;  Service: Pain Management;  Laterality: N/A;  asa    EPIDURAL STEROID INJECTION INTO LUMBAR SPINE N/A 5/22/2024    Procedure: L5/S1 Interlaminar SOPHY;  Surgeon: Maile Storm DO;  Location: [facility] PAIN MANAGEMENT;  Service: Pain Management;  Laterality: N/A;  20mins-ASA 5d    ESOPHAGOGASTRODUODENOSCOPY N/A 02/07/2023    Procedure: EGD (ESOPHAGOGASTRODUODENOSCOPY);  Surgeon: Dougie Shea MD;  Location: [facility];  Service: Endoscopy;  Laterality: N/A;    FOOT FRACTURE SURGERY      HYSTERECTOMY      VAGINAL HYSTERECTOMY WITHOUT BSO - ENDOMETRIOSIS    INJECTION FOR SENTINEL NODE IDENTIFICATION Right 05/09/2022    Procedure: INJECTION, FOR SENTINEL NODE IDENTIFICATION-Right;  Surgeon: NEAL Dhillon MD;  Location: [facility];  Service: General;  Laterality: Right;    INJECTION OF ANESTHETIC AGENT AROUND MEDIAL BRANCH NERVES INNERVATING LUMBAR FACET JOINT N/A 04/11/2019    Procedure: Lumbo-sacral Block, DR5 and Lateral Branches of S1,S2, S3;  Surgeon: Michelle Pineda Jr., MD;  Location: [facility] PAIN MGT;  Service: Pain Management;  Laterality: N/A;  Pt takes  and states she holds ASA on her own whenever she has procedures.  Instructed to hold x 3 days prior to procedure.      INJECTION OF ANESTHETIC AGENT AROUND MEDIAL BRANCH NERVES INNERVATING LUMBAR FACET JOINT Bilateral 05/03/2023    Procedure: Block-nerve-medial branch-lumbar bilateral L3, L4, L5;  Surgeon:  Maile Storm DO;  Location: [facility] PAIN MGT;  Service: Pain Management;  Laterality: Bilateral;  asa    INJECTION OF ANESTHETIC AGENT INTO SACROILIAC JOINT Right 08/30/2018    Procedure: BLOCK, SACROILIAC JOINT-Right- ORAL SEDATION;  Surgeon: Michelle Pineda Jr., MD;  Location: [facility] PAIN MGT;  Service: Pain Management;  Laterality: Right;    INJECTION OF ANESTHETIC AGENT INTO SACROILIAC JOINT Bilateral 09/27/2018    Procedure: BLOCK, SACROILIAC JOINT-BILATERAL;  Surgeon: Michelle Pineda Jr., MD;  Location: [facility] PAIN MGT;  Service: Pain Management;  Laterality: Bilateral;  Please keep at 10:00 due to trasnsportation    INJECTION OF ANESTHETIC AGENT INTO SACROILIAC JOINT Bilateral 02/07/2019    Procedure: Bilateral Sacroiliac Joint Injection - Per Dr Pineda, not necessary to hold ASA.;  Surgeon: Michelle Pineda Jr., MD;  Location: [facility] PAIN MGT;  Service: Pain Management;  Laterality: Bilateral;    INJECTION, SPINE, LUMBOSACRAL, TRANSFORAMINAL APPROACH Right 7/17/2024    Procedure: Right  L3/4  and L4/5 TFESI;  Surgeon: Maile Storm DO;  [facility]: [facility];  Service: Pain Management;  Laterality: Right;  20 mins  ASA 5 days    INTRAOCULAR PROSTHESES INSERTION Right 08/01/2019    Procedure: INSERTION, IOL PROSTHESIS;  Surgeon: Karen Song MD;  Location: [address];  Service: Ophthalmology;  Laterality: Right;    INTRAOCULAR PROSTHESES INSERTION Left 09/26/2019    Procedure: INSERTION, IOL PROSTHESIS;  Surgeon: Karen Song MD;  Location: [address];  Service: Ophthalmology;  Laterality: Left;    JOINT REPLACEMENT Right     knee    KNEE SURGERY      MASTECTOMY Right 05/09/2022    Procedure: MASTECTOMY-Right;  Surgeon: NEAL Dhillon MD;  Location: [facility];  Service: General;  Laterality: Right;  2.5 HOURS    PHACOEMULSIFICATION OF CATARACT Right 08/01/2019    Procedure: PHACOEMULSIFICATION, CATARACT;  Surgeon: Karen Song MD;  Location: [address];   Service: Ophthalmology;  Laterality: Right;    PHACOEMULSIFICATION OF CATARACT Left 09/26/2019    Procedure: PHACOEMULSIFICATION, CATARACT;  Surgeon: Karen Song MD;  Location: [address];  Service: Ophthalmology;  Laterality: Left;    RADIOFREQUENCY ABLATION, NERVE, PERIPHERAL Right 04/24/2024    Procedure: RIGHT L5 Dorsal Ramus and RIGHT Lateral Branches of S1, S2, and S3;  Surgeon: Maile Storm DO;  Location: [facility] PAIN MANAGEMENT;  Service: Pain Management;  Laterality: Right;  30 mins    RADIOFREQUENCY THERMOCOAGULATION Right 05/07/2019    Procedure: RADIOFREQUENCY THERMAL COAGULATION RIGHT DORSAL RAMUS 5 AND LATERAL BRANCH OF S1, S2 AND S3;  Surgeon: Michelle Pineda Jr., MD;  Location: [facility];  Service: Pain Management;  Laterality: Right;    RADIOFREQUENCY THERMOCOAGULATION Left 05/14/2019    Procedure: RADIOFREQUENCY THERMAL COAGULATION LEFT DORSAL RAMUS 5 AND LATERAL BRANCH OF S1,S2 AND S3;  Surgeon: Michelle Pineda Jr., MD;  Location: [facility] PAIN T;  Service: Pain Management;  Laterality: Left;    RADIOFREQUENCY THERMOCOAGULATION Right 10/22/2019    Procedure: RADIOFREQUENCY THERMAL COAGULATION---DARSAL RAMUS 5 and LATERAL S1,S2,and S3 Right;  Surgeon: Michelle Pineda Jr., MD;  Location: [facility];  Service: Pain Management;  Laterality: Right;  patient to sign consent DOS    RADIOFREQUENCY THERMOCOAGULATION Left 10/29/2019    Procedure: RADIOFREQUENCY THERMAL COAGULATION - LEFT - DR5, S1,S2, AND S3;  Surgeon: Michelle Pineda Jr., MD;  Location: [facility] PAIN T;  Service: Pain Management;  Laterality: Left;    RADIOFREQUENCY THERMOCOAGULATION Left 05/26/2020    Procedure: RADIOFREQUENCY THERMAL COAGULATION--Left DR5+ lateral branches of S1, S2, S3;  Surgeon: Michelle Pineda Jr., MD;  Location: [facility] PAIN T;  Service: Pain Management;  Laterality: Left;    RADIOFREQUENCY THERMOCOAGULATION Right 06/02/2020    Procedure: RADIOFREQUENCY THERMAL COAGULATION--Right DR5+  lateral branches of S1, S2, S3;  Surgeon: Michelle Pineda Jr., MD;  Location: [facility] MGT;  Service: Pain Management;  Laterality: Right;    SENTINEL LYMPH NODE BIOPSY Right 2022    Procedure: BIOPSY, LYMPH NODE, SENTINEL-Right;  Surgeon: NEAL Dhillon MD;  Location: [facility] OR;  Service: General;  Laterality: Right;    SINUS SURGERY      Surgery on right knee  1982    TONSILLECTOMY      tumor removed from back left side upper shoulder  2006       Review of patient's allergies indicates:   Allergen Reactions    Etanercept Other (See Comments)     Other reaction(s): recurrent infections    Chloramphenicol sod succinate Hives    Codeine Other (See Comments)     Other reaction(s): Stomach upset. Pt states OK with Percocet    Nickel sutures [surgical stainless steel] Dermatitis     Allergic contact dermatitis    Adhesive Rash       Family History       Problem Relation (Age of Onset)    Arthritis Brother    Asthma Mother    Cancer Mother, Father    Colon cancer Mother    Depression Mother    Diabetes Brother    Heart disease Brother    No Known Problems Daughter    Psoriasis Mother    Stroke Sister          Tobacco Use    Smoking status: Every Day     Current packs/day: 0.00     Average packs/day: 0.3 packs/day for 10.0 years (2.5 ttl pk-yrs)     Types: Cigarettes     Start date: 1/10/2013     Last attempt to quit: 1/10/2023     Years since quittin.1     Passive exposure: Never    Smokeless tobacco: Former    Tobacco comments:     about 10 cigs a day 1/15/25   Substance and Sexual Activity    Alcohol use: No    Drug use: No    Sexual activity: Not Currently     Partners: Male     Birth control/protection: Post-menopausal      Review of Systems   All other systems reviewed and are negative.    Objective:     Vital Signs (Most Recent):  Temp: 97.7 °F (36.5 °C) (25)  Pulse: 67 (25)  Resp: 16 (25)  BP: (!) 118/58 (25)  SpO2: 95 % (25) Vital  "Signs (24h Range):  Temp:  [97.7 °F (36.5 °C)-98.7 °F (37.1 °C)] 97.7 °F (36.5 °C)  Pulse:  [58-90] 67  Resp:  [10-31] 16  SpO2:  [88 %-99 %] 95 %  BP: ()/(45-58) 118/58  Arterial Line BP: ()/(33-43) 122/43     Weight: 45.8 kg (101 lb)  Body mass index is 19.08 kg/m².      Intake/Output Summary (Last 24 hours) at 2/19/2025 1740  Last data filed at 2/19/2025 1730  Gross per 24 hour   Intake 1829.91 ml   Output --   Net 1829.91 ml          Physical Exam  Constitutional:       Appearance: Normal appearance.   HENT:      Head: Normocephalic and atraumatic.   Eyes:      Extraocular Movements: Extraocular movements intact.   Neck:      Comments: Right neck dressing  Cardiovascular:      Rate and Rhythm: Normal rate.   Pulmonary:      Effort: Pulmonary effort is normal.   Neurological:      General: No focal deficit present.      Mental Status: She is alert and oriented to person, place, and time.   Psychiatric:         Mood and Affect: Mood normal.         Behavior: Behavior normal.         Thought Content: Thought content normal.            Vents:       Lines/Drains/Airways       Airway  Duration                  Airway - Non-Surgical 02/19/25 0810 <1 day              Arterial Line  Duration             Arterial Line 02/19/25 0815 Left Radial <1 day              Peripheral Intravenous Line  Duration                  Peripheral IV - Single Lumen 02/19/25 0656 20 G Left;Posterior Hand <1 day         Peripheral IV - Single Lumen 02/19/25 1000 18 G Left Antecubital <1 day                    Significant Labs:    CBC/Anemia Profile:  No results for input(s): "WBC", "HGB", "HCT", "PLT", "MCV", "RDW", "IRON", "FERRITIN", "RETIC", "FOLATE", "[id number]", "OCCULTBLOOD" in the last 48 hours.     Chemistries:  No results for input(s): "NA", "K", "CL", "CO2", "BUN", "CREATININE", "CALCIUM", "ALBUMIN", "PROT", "BILITOT", "ALKPHOS", "ALT", "AST", "GLUCOSE", "MG", "PHOS" in the last 48 hours.    All pertinent labs within the " past 24 hours have been reviewed.    Significant Imaging: I have reviewed all pertinent imaging results/findings within the past 24 hours.

## 2025-02-19 NOTE — ANESTHESIA PROCEDURE NOTES
Intubation    Date/Time: 2/19/2025 8:10 AM    Performed by: Eladio Najera DO  Authorized by: Willie Combs MD    Intubation:     Induction:  Intravenous    Intubated:  Postinduction    Mask Ventilation:  Easy with oral airway    Attempts:  1    Attempted By:  Resident anesthesiologist    Method of Intubation:  Video laryngoscopy    Blade:  Gregory 3    Laryngeal View Grade: Grade I - full view of cords      Difficult Airway Encountered?: No      Complications:  None    Airway Device:  Oral endotracheal tube    Airway Device Size:  7.0    Style/Cuff Inflation:  Cuffed (inflated to minimal occlusive pressure)    Tube secured:  22    Secured at:  The teeth    Placement Verified By:  Capnometry and Revisualization with laryngoscopy    Complicating Factors:  None    Findings Post-Intubation:  BS equal bilateral and atraumatic/condition of teeth unchanged

## 2025-02-19 NOTE — H&P
[facility]  Critical Care - Surgery  History & Physical    Patient Name: Bhavna Landry  MRN: [id number]  Admission Date: 2/19/2025  Code Status: Full Code  Attending Physician: SLIME Johnson III, MD   Primary Care Provider: Sadaf Vargas MD   Principal Problem: Bilateral carotid artery disease    Subjective:     HPI:  Kosta Landry is a 69 year old female with a PMHx of HTN, GERD, schizophrenia, HLD, cervical spondylolysis. The patient presents to the SICU s/p Right carotid endarectomy with bovine patch with Dr. Johnson on 02/19/2025. On admission, maintain blood pressure at a SBP of 100-160. Access includes 2 PIV, and arterial access includes a left radial arterial line. Immediate post-operative plans include hemodynamic stabilization and closely monitor fluid status with strict Is/Os and continued fluid resuscitation as needed.      Hospital/ICU Course:  No notes on file    Follow-up For: Procedure(s) (LRB):  ENDARTERECTOMY-CAROTID (Right)    Post-Operative Day: * Day of Surgery *     Past Medical History:   Diagnosis Date    Allergy     Amblyopia     Anemia     Anticoagulant long-term use     Arthritis 02/02/1992    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, estrogen receptor positive 04/13/2022    Cataract     Depression     Dry eyes     Dry mouth     Duane's syndrome of right eye     Early dry stage nonexudative age-related macular degeneration of both eyes 09/20/2022    Fibromyalgia 04/17/2014    Fibromyalgia     Fractured hip     RIGHT HIP    GERD (gastroesophageal reflux disease)     History of psychiatric hospitalization     Hyperlipidemia 02/02/1992    Hypertension     Hypocalcemia     Hyponatremia     Kidney stone     Migraine headache     Osteoporosis     Pressure ulcer of unspecified site, unspecified stage     Psoriatic arthritis 02/02/1992    Recurrent upper respiratory infection (URI)     Right knee pain     post knee replacement surgery (possible  rejectiion of metal)    RLS (restless legs syndrome)     Schizophrenia 1992    stable on meds    Sciatica     Squamous cell carcinoma of skin     Urinary tract infection        Past Surgical History:   Procedure Laterality Date    brow ptosis repair Right 2019    Surgeon: Dr. Karla Henson    CATARACT EXTRACTION      CAUDAL EPIDURAL STEROID INJECTION N/A 2024    Procedure: Caudal SOPHY;  Surgeon: Maile Storm DO;  [facility]: [facility];  Service: Pain Management;  Laterality: N/A;  ASA ok    CAUDAL EPIDURAL STEROID INJECTION N/A 2025    Procedure: Caudal SOPHY;  Surgeon: Maile Storm DO;  Location: [facility];  Service: Pain Management;  Laterality: N/A;  asa ok     SECTION      COLONOSCOPY N/A 2016    Procedure: COLONOSCOPY;  Surgeon: ANDRIA Connelly MD;  Location: [address]);  Service: Endoscopy;  Laterality: N/A;    COLONOSCOPY N/A 2022    Procedure: COLONOSCOPY;  Surgeon: Mikey Walters MD;  Location: [facility];  Service: Endoscopy;  Laterality: N/A;    cyst removed from right sinus  1982    EPIDURAL STEROID INJECTION INTO CERVICAL SPINE N/A 2023    Procedure: C6-7 SOPHY;  Surgeon: Spring Jensen MD;  [facility]: [facility];  Service: Pain Management;  Laterality: N/A;  20 mins    EPIDURAL STEROID INJECTION INTO LUMBAR SPINE N/A 2023    Procedure: Injection-steroid-epidural-lumbar L5-S1;  Surgeon: Chirag Kim MD;  [facility]: [facility];  Service: Pain Management;  Laterality: N/A;  asa    EPIDURAL STEROID INJECTION INTO LUMBAR SPINE N/A 2024    Procedure: L5/S1 Interlaminar SOPHY;  Surgeon: Maile Storm DO;  [facility]: [facility];  Service: Pain Management;  Laterality: N/A;  20mins-ASA 5d    ESOPHAGOGASTRODUODENOSCOPY N/A 2023    Procedure: EGD (ESOPHAGOGASTRODUODENOSCOPY);  Surgeon: Dougie Shae MD;  Location: [facility];  Service: Endoscopy;  Laterality: N/A;    FOOT  FRACTURE SURGERY      HYSTERECTOMY      VAGINAL HYSTERECTOMY WITHOUT BSO - ENDOMETRIOSIS    INJECTION FOR SENTINEL NODE IDENTIFICATION Right 05/09/2022    Procedure: INJECTION, FOR SENTINEL NODE IDENTIFICATION-Right;  Surgeon: NEAL Dhillon MD;  Location: [facility];  Service: General;  Laterality: Right;    INJECTION OF ANESTHETIC AGENT AROUND MEDIAL BRANCH NERVES INNERVATING LUMBAR FACET JOINT N/A 04/11/2019    Procedure: Lumbo-sacral Block, DR5 and Lateral Branches of S1,S2, S3;  Surgeon: Michelle Pineda Jr., MD;  Location: [facility];  Service: Pain Management;  Laterality: N/A;  Pt takes  and states she holds ASA on her own whenever she has procedures.  Instructed to hold x 3 days prior to procedure.      INJECTION OF ANESTHETIC AGENT AROUND MEDIAL BRANCH NERVES INNERVATING LUMBAR FACET JOINT Bilateral 05/03/2023    Procedure: Block-nerve-medial branch-lumbar bilateral L3, L4, L5;  Surgeon: Maile Storm DO;  Location: [facility];  Service: Pain Management;  Laterality: Bilateral;  asa    INJECTION OF ANESTHETIC AGENT INTO SACROILIAC JOINT Right 08/30/2018    Procedure: BLOCK, SACROILIAC JOINT-Right- ORAL SEDATION;  Surgeon: Michelle Pineda Jr., MD;  Location: [facility];  Service: Pain Management;  Laterality: Right;    INJECTION OF ANESTHETIC AGENT INTO SACROILIAC JOINT Bilateral 09/27/2018    Procedure: BLOCK, SACROILIAC JOINT-BILATERAL;  Surgeon: Michelle Pineda Jr., MD;  Location: [facility];  Service: Pain Management;  Laterality: Bilateral;  Please keep at 10:00 due to trasnsportation    INJECTION OF ANESTHETIC AGENT INTO SACROILIAC JOINT Bilateral 02/07/2019    Procedure: Bilateral Sacroiliac Joint Injection - Per Dr Pineda, not necessary to hold ASA.;  Surgeon: Michelle Pineda Jr., MD;  Location: [facility];  Service: Pain Management;  Laterality: Bilateral;    INJECTION, SPINE, LUMBOSACRAL, TRANSFORAMINAL APPROACH Right 7/17/2024    Procedure: Right  L3/4  and L4/5  TFESI;  Surgeon: Maile Storm DO;  Location: [facility];  Service: Pain Management;  Laterality: Right;  20 mins  ASA 5 days    INTRAOCULAR PROSTHESES INSERTION Right 08/01/2019    Procedure: INSERTION, IOL PROSTHESIS;  Surgeon: Karen Song MD;  [facility];  Service: Ophthalmology;  Laterality: Right;    INTRAOCULAR PROSTHESES INSERTION Left 09/26/2019    Procedure: INSERTION, IOL PROSTHESIS;  Surgeon: Karen Song MD;  Location: [facility] OR [address] [place] [address];  Service: Ophthalmology;  Laterality: Left;    JOINT REPLACEMENT Right     knee    KNEE SURGERY      MASTECTOMY Right 05/09/2022    Procedure: MASTECTOMY-Right;  Surgeon: NEAL Dhillon MD;  Location: [facility];  Service: General;  Laterality: Right;  2.5 HOURS    PHACOEMULSIFICATION OF CATARACT Right 08/01/2019    Procedure: PHACOEMULSIFICATION, CATARACT;  Surgeon: Karen Song MD;  Location: [facility] OR [address] [place] [address];  Service: Ophthalmology;  Laterality: Right;    PHACOEMULSIFICATION OF CATARACT Left 09/26/2019    Procedure: PHACOEMULSIFICATION, CATARACT;  Surgeon: Karen Song MD;  Location: [facility] OR [address] [place] [address];  Service: Ophthalmology;  Laterality: Left;    RADIOFREQUENCY ABLATION, NERVE, PERIPHERAL Right 04/24/2024    Procedure: RIGHT L5 Dorsal Ramus and RIGHT Lateral Branches of S1, S2, and S3;  Surgeon: Maile Storm DO;  Location: [facility];  Service: Pain Management;  Laterality: Right;  30 mins    RADIOFREQUENCY THERMOCOAGULATION Right 05/07/2019    Procedure: RADIOFREQUENCY THERMAL COAGULATION RIGHT DORSAL RAMUS 5 AND LATERAL BRANCH OF S1, S2 AND S3;  Surgeon: Michelle Pineda Jr., MD;  Location: [facility];  Service: Pain Management;  Laterality: Right;    RADIOFREQUENCY THERMOCOAGULATION Left 05/14/2019    Procedure: RADIOFREQUENCY THERMAL COAGULATION LEFT DORSAL RAMUS 5 AND LATERAL BRANCH OF S1,S2 AND S3;  Surgeon: Michelle Pineda Jr., MD;  Location: [facility];  Service: Pain Management;   Laterality: Left;    RADIOFREQUENCY THERMOCOAGULATION Right 10/22/2019    Procedure: RADIOFREQUENCY THERMAL COAGULATION---DARSAL RAMUS 5 and LATERAL S1,S2,and S3 Right;  Surgeon: Michelle Pineda Jr., MD;  Location: [facility];  Service: Pain Management;  Laterality: Right;  patient to sign consent DOS    RADIOFREQUENCY THERMOCOAGULATION Left 10/29/2019    Procedure: RADIOFREQUENCY THERMAL COAGULATION - LEFT - DR5, S1,S2, AND S3;  Surgeon: Michelle Pineda Jr., MD;  Location: [facility];  Service: Pain Management;  Laterality: Left;    RADIOFREQUENCY THERMOCOAGULATION Left 05/26/2020    Procedure: RADIOFREQUENCY THERMAL COAGULATION--Left DR5+ lateral branches of S1, S2, S3;  Surgeon: Michelle Pineda Jr., MD;  Location: [facility];  Service: Pain Management;  Laterality: Left;    RADIOFREQUENCY THERMOCOAGULATION Right 06/02/2020    Procedure: RADIOFREQUENCY THERMAL COAGULATION--Right DR5+ lateral branches of S1, S2, S3;  Surgeon: Michelle Pineda Jr., MD;  Location: [facility];  Service: Pain Management;  Laterality: Right;    SENTINEL LYMPH NODE BIOPSY Right 05/09/2022    Procedure: BIOPSY, LYMPH NODE, SENTINEL-Right;  Surgeon: NEAL Dhillon MD;  Location: [facility];  Service: General;  Laterality: Right;    SINUS SURGERY      Surgery on right knee  07/09/1982    TONSILLECTOMY      tumor removed from back left side upper shoulder  03/17/2006       Review of patient's allergies indicates:   Allergen Reactions    Etanercept Other (See Comments)     Other reaction(s): recurrent infections    Chloramphenicol sod succinate Hives    Codeine Other (See Comments)     Other reaction(s): Stomach upset. Pt states OK with Percocet    Nickel sutures [surgical stainless steel] Dermatitis     Allergic contact dermatitis    Adhesive Rash       Family History       Problem Relation (Age of Onset)    Arthritis Brother    Asthma Mother    Cancer Mother, Father    Colon cancer Mother    Depression Mother    Diabetes  Brother    Heart disease Brother    No Known Problems Daughter    Psoriasis Mother    Stroke Sister          Tobacco Use    Smoking status: Every Day     Current packs/day: 0.00     Average packs/day: 0.3 packs/day for 10.0 years (2.5 ttl pk-yrs)     Types: Cigarettes     Start date: 1/10/2013     Last attempt to quit: 1/10/2023     Years since quittin.1     Passive exposure: Never    Smokeless tobacco: Former    Tobacco comments:     about 10 cigs a day 1/15/25   Substance and Sexual Activity    Alcohol use: No    Drug use: No    Sexual activity: Not Currently     Partners: Male     Birth control/protection: Post-menopausal      Review of Systems   All other systems reviewed and are negative.    Objective:     Vital Signs (Most Recent):  Temp: 97.7 °F (36.5 °C) (25)  Pulse: 67 (25)  Resp: 16 (25)  BP: (!) 118/58 (25)  SpO2: 95 % (25) Vital Signs (24h Range):  Temp:  [97.7 °F (36.5 °C)-98.7 °F (37.1 °C)] 97.7 °F (36.5 °C)  Pulse:  [58-90] 67  Resp:  [10-31] 16  SpO2:  [88 %-99 %] 95 %  BP: ()/(45-58) 118/58  Arterial Line BP: ()/(33-43) 122/43     Weight: 45.8 kg (101 lb)  Body mass index is 19.08 kg/m².      Intake/Output Summary (Last 24 hours) at 2025 1740  Last data filed at 2025 1730  Gross per 24 hour   Intake 1829.91 ml   Output --   Net 1829.91 ml          Physical Exam  Constitutional:       Appearance: Normal appearance.   HENT:      Head: Normocephalic and atraumatic.   Eyes:      Extraocular Movements: Extraocular movements intact.   Neck:      Comments: Right neck dressing  Cardiovascular:      Rate and Rhythm: Normal rate.   Pulmonary:      Effort: Pulmonary effort is normal.   Neurological:      General: No focal deficit present.      Mental Status: She is alert and oriented to person, place, and time.   Psychiatric:         Mood and Affect: Mood normal.         Behavior: Behavior normal.         Thought Content: Thought  "content normal.            Vents:       Lines/Drains/Airways       Airway  Duration                  Airway - Non-Surgical 02/19/25 0810 <1 day              Arterial Line  Duration             Arterial Line 02/19/25 0815 Left Radial <1 day              Peripheral Intravenous Line  Duration                  Peripheral IV - Single Lumen 02/19/25 0656 20 G Left;Posterior Hand <1 day         Peripheral IV - Single Lumen 02/19/25 1000 18 G Left Antecubital <1 day                    Significant Labs:    CBC/Anemia Profile:  No results for input(s): "WBC", "HGB", "HCT", "PLT", "MCV", "RDW", "IRON", "FERRITIN", "RETIC", "FOLATE", "IGEXUGHG54", "OCCULTBLOOD" in the last 48 hours.     Chemistries:  No results for input(s): "NA", "K", "CL", "CO2", "BUN", "CREATININE", "CALCIUM", "ALBUMIN", "PROT", "BILITOT", "ALKPHOS", "ALT", "AST", "GLUCOSE", "MG", "PHOS" in the last 48 hours.    All pertinent labs within the past 24 hours have been reviewed.    Significant Imaging: I have reviewed all pertinent imaging results/findings within the past 24 hours.  Assessment/Plan:     Cardiac/Vascular  * Bilateral carotid artery disease  S/p R CEA w/ Dr. Johnson on 2/19.    Neuro/Psych:   - Sedation: none  - Pain: scheduled tylenol, pregabalin, benztropine. Prn Oxy 5  - risperidone qhs  - venlafaxine qd and qhs                Cardiac:   - BP Goal: -160  - Pressors: none  - Rhythm: NSR  - Anti-HTNs: amlodipine 5mg bid  - Statin 80mg qd    Pulmonary:   - Goal SpO2 >92%  - Supplemental O2 as needed  - scheduled duonebs      Renal:    No results for input(s): "BUN", "CREATININE" in the last 168 hours.     - Record strict Is/Os    FEN / GI:     - Daily CMP, Mag/Phos    - Replace electrolytes as needed    - Nutrition: regular diet    - Bowel Regimen: Miralax, senna     ID:   - Afebrile  - Complete saskia-op ancef  No results for input(s): "WBC" in the last 168 hours.    Heme/Onc:   No results for input(s): "HGB", "PLT", "APTT", "INR" in the " last 168 hours.  - CBC daily  - DVT ppx: SQH  - ASA 81mg qd    Endocrine:   - Goal BG <180  - POCT Glucose prn  - SSI     PPx:   Feeding: regular diet  Analgesia/Sedation:  MMPC  Thromboembolic Prevention: SQH  HOB >30: Yes  Stress Ulcer: N/a  Glucose Control: BG Goal <180. SSI     Lines/Drains/Airway:  Left radial arterial line  PIV       Dispo/Code Status/Palliative:    - SICU   - Full Code          Sylwia Melendez MD  Critical Care - Surgery  [facility] - [facility]

## 2025-02-19 NOTE — OP NOTE
[facility] - [facility] ([facility])  Operative Note       Surgery Date: 2/19/2025      Surgeons and Role:     * SLIME Johnson III, MD - Primary     * Gisella Meredith MD - Resident - Assisting     * Kayode Cheek MD - Fellow     Pre-op Diagnosis:  >80% R carotid stenosis, Asx     Post-op Diagnosis:  same     Procedure(s) (LRB):  ENDARTERECTOMY-CAROTID (Right) with bovine patch     Anesthesia: General     Implants:  Implant Name Type Inv. Item Serial No.  Lot No. LRB No. Used Action   GRAFT VAS GUARD 0.8X8CM BOVINE - [id number]   GRAFT VAS GUARD 0.8X8CM BOVINE   [employer] [id number] Right 1 Implanted         Operative Findings: severe Ca++ stenosis extending 3 cm above bulb     Very robust ICA backbleeding, thus performed without a shunt as needed optimal visualization of the endpoint     Estimated Blood Loss: 45cc    Indications:  This patient is a 69-year-old female presenting with asymptomatic right internal carotid artery stenosis.  She is a long-time smoker.  She is maintained on aspirin and high-dose statin.  Carotid duplex was significant for greater than 75% stenosis.  She was offered a right carotid endarterectomy.  Informed consent was obtained.    Procedure in Detail: The patient was placed in the supine odell chair position.  Time-outs were performed using both pre induction and pre incision safety checklist to verify correct patient, procedure, site, and additional critical information prior to beginning the procedure.  The procedure was performed under general endotracheal anesthesia.  The right neck was prepped and draped in standard sterile fashion.  A longitudinal incision was made over the anterior border of the sternocleidomastoid muscle with a 15 blade scalpel.  The incision was deepened through the platysma with electrocautery.  The sternocleidomastoid was retracted laterally.  Small crossing veins were ligated with 3-0 silk suture.  The internal jugular vein was  identified.  Dissection along the medial and superior surface of the internal jugular vein led to the facial vein.  The facial vein was suture ligated with 2-0 silk suture.  The common carotid, internal carotid, external carotid, and superior thyroid arteries were exposed and dissected.  An umbilical tape was passed around the common carotid with a Garry tourniquet.  Of note, the plaque did extent quite distally requiring higher than normal dissection. Once healthy ICA was encountered, the ICA was controlled with a short Garry tourniquet, and the ECA and superior thyroid arteries were controlled with blue silastic vessel loops.  The vagus and hypoglossal nerves were clearly identified and preserved.  Extreme traction to the mandible was avoided in order to protect the marginal mandibular nerve. The patient was administered therapeutic dosing of IV heparin and an activated clotting time ACT was checked.  3 minutes after the heparin was administered the internal carotid artery was clamped with angled small blue titanium clamp.  The common carotid was then clamped with an angled DeBakey clamp.  An arteriotomy was performed on the anterior surface of the common carotid artery with an 11 blade scalpel.  The incision was extended anterior laterally over the internal carotid artery with Mann scissors.  It was also extended proximally along the common carotid artery with Mann scissors. Back bleeding was checked and was noted to be robust, no indwelling shunt was used.     Next the endarterectomy plane was performed using a [place] elevator.  The plaque was transected proximally in the common carotid artery with Mann scissors. Next an eversion technique was performed for the external carotid artery and it was flushed with heparinized saline.  In the distal internal carotid artery plaque was meticulously feathered off achieving a smooth endpoint. The endarterectomized artery was carefully inspected and small portions of  plaque were dissected off using Valarie's forceps.  The endarterectomized surface was gently irrigating using heparinized saline.  All remaining debris was removed with fine Valarie's forceps. In total, 4 tacking stitches were placed with 7 0 Prolenes. The arteriotomy was closed using a patch angioplasty with bovine pericardium.  The patch was trimmed and modified and was sewn in with 6 0 Prolene suture.  This was performed in a continuous fashion.  This suture was started at the apex of the arteriotomy and ran on each side.  The patch was appropriately trimmed at the other end using Metzenbaum scissors and the Prolene was continued around this end of the patch and ran in a continuous fashion along either side of the artery. Prior to throwing the last few throws, the  internal carotid artery was allowed to back bleed.  The internal carotid artery was then again clamped.  Next the internal carotid, external carotid, and superior thyroid arteries were allowed to back bleed.  These with all clamped again the common carotid artery was flushed forward. The carotid lumen was irrigated once again with heparinized saline.  Flow was 1st reestablished to the external carotid artery and then flow was reestablished to the internal carotid artery.  The suture line was checked for hemostasis and small needle hole bleeding was controlled with Surgicel. No repair stitches were placed to reinforce the suture line.  Doppler confirmed excellent flow through the common carotid artery, external carotid artery, and internal carotid artery.  After carefully ensuring hemostasis the wound was closed with 2-0 Vicryl suture closing the platysmal layer 1st.  The skin was closed with 4-0 Monocryl in a running continuous fashion.  Sterile Telfa/Tegaderm dressings were applied.  A debriefing checklist was performed to share information critical to patient care.      The patient tolerated the procedure well, the patient was awakened, and noted to have  no neurological deficits and was taken to the post anesthesia recovery unit in good condition.      All needle, sponge and instrument counts were correct at the end of the case.    Dr. Johnson was present for the entire case.

## 2025-02-19 NOTE — TRANSFER OF CARE
"Anesthesia Transfer of Care Note    Patient: Bhavna Landry    Procedure(s) Performed: Procedure(s) (LRB):  ENDARTERECTOMY-CAROTID (Right)    Patient location: PACU    Anesthesia Type: general    Transport from OR: Transported from OR on 6-10 L/min O2 by face mask with adequate spontaneous ventilation    Post pain: adequate analgesia    Post assessment: no apparent anesthetic complications and tolerated procedure well    Post vital signs: stable    Level of consciousness: awake, alert and oriented    Nausea/Vomiting: no nausea/vomiting    Complications: none    Transfer of care protocol was followed      Last vitals: Visit Vitals  BP (!) 109/53   Pulse 90   Temp 37.1 °C (98.7 °F) (Temporal)   Resp 20   Ht 5' 1" (1.549 m)   Wt 45.8 kg (101 lb)   SpO2 98%   Breastfeeding No   BMI 19.08 kg/m²     "

## 2025-02-19 NOTE — INTERVAL H&P NOTE
The patient has been examined and the H&P has been reviewed:    I concur with the findings and no changes have occurred since H&P was written.    Surgery risks, benefits and alternative options discussed and understood by patient/family.      Last ASA was this morning.     There are no hospital problems to display for this patient.

## 2025-02-19 NOTE — ASSESSMENT & PLAN NOTE
"S/p R CEA w/ Dr. Johnson on 2/19.    Neuro/Psych:   - Sedation: none  - Pain: scheduled tylenol, pregabalin, benztropine. Prn Oxy 5  - risperidone qhs  - venlafaxine qd and qhs                Cardiac:   - BP Goal: -160  - Pressors: none  - Rhythm: NSR  - Anti-HTNs: amlodipine 5mg bid  - Statin 80mg qd    Pulmonary:   - Goal SpO2 >92%  - Supplemental O2 as needed  - scheduled duonebs      Renal:    No results for input(s): "BUN", "CREATININE" in the last 168 hours.     - Record strict Is/Os    FEN / GI:     - Daily CMP, Mag/Phos    - Replace electrolytes as needed    - Nutrition: regular diet    - Bowel Regimen: Miralax, senna     ID:   - Afebrile  - Complete saskia-op ancef  No results for input(s): "WBC" in the last 168 hours.    Heme/Onc:   No results for input(s): "HGB", "PLT", "APTT", "INR" in the last 168 hours.  - CBC daily  - DVT ppx: SQH  - ASA 81mg qd    Endocrine:   - Goal BG <180  - POCT Glucose prn  - SSI     PPx:   Feeding: regular diet  Analgesia/Sedation:  MMPC  Thromboembolic Prevention: SQH  HOB >30: Yes  Stress Ulcer: N/a  Glucose Control: BG Goal <180. SSI     Lines/Drains/Airway:  Left radial arterial line  PIV       Dispo/Code Status/Palliative:    - SICU   - Full Code   "

## 2025-02-19 NOTE — NURSING TRANSFER
Nursing Transfer Note      2/19/2025   5:34 PM    Pt transported to [id number] by rn on bed c transport monitor.  VSS, denies pain, n/v, sob.  Report given to receiving icu rn, family updated

## 2025-02-19 NOTE — HPI
Kosta Landry is a 69 year old female with a PMHx of HTN, GERD, schizophrenia, HLD, cervical spondylolysis. The patient presents to the SICU s/p Right carotid endarectomy with bovine patch with Dr. Johnson on 02/19/2025. On admission, maintain blood pressure at a SBP of 100-160. Access includes 2 PIV, and arterial access includes a left radial arterial line. Immediate post-operative plans include hemodynamic stabilization and closely monitor fluid status with strict Is/Os and continued fluid resuscitation as needed.     Mother notified of MD recommendations and verbalized understanding.  Ambulatory EEG order faxed to [facility].

## 2025-02-19 NOTE — BRIEF OP NOTE
[facility] - Surgery (2nd Fl)  Brief Operative Note    SUMMARY     Surgery Date: 2/19/2025     Surgeons and Role:     * SLIME Johnson III, MD - Primary     * Gisella Meredith MD - Resident - Assisting     * Kayode Cheek MD - Fellow    Pre-op Diagnosis:  >80% R carotid stenosis, Asx    Post-op Diagnosis:  same    Procedure(s) (LRB):  ENDARTERECTOMY-CAROTID (Right) with bovine patch    Anesthesia: General    Implants:  Implant Name Type Inv. Item Serial No.  Lot No. LRB No. Used Action   GRAFT VAS GUARD 0.8X8CM BOVINE - [id number]  GRAFT VAS GUARD 0.8X8CM BOVINE  [employer] [id number] Right 1 Implanted       Operative Findings: severe Ca++ stenosis extending 3 cm above bulb    Very robust ICA backbleeding, thus performed without a shunt as needed optimal visualization of the endpoint    Estimated Blood Loss:45cc           Specimens:   Specimen (24h ago, onward)      None            [id number]

## 2025-02-19 NOTE — ANESTHESIA PROCEDURE NOTES
Arterial    Diagnosis: bilateral carotid artery stenosis    Patient location during procedure: done in OR  Timeout: 2/19/2025 8:15 AM  Procedure end time: 2/19/2025 8:18 AM    Staffing  Authorizing Provider: Willie Combs MD  Performing Provider: Eladio Najera DO    Staffing  Performed by: Eladio Najera DO  Authorized by: Willie Combs MD    Anesthesiologist was present at the time of the procedure.    Preanesthetic Checklist  Completed: patient identified, IV checked, site marked, risks and benefits discussed, surgical consent, monitors and equipment checked, pre-op evaluation, timeout performed and anesthesia consent givenArterial  Skin Prep: chlorhexidine gluconate  Orientation: left  Location: radial    Catheter Size: 20 G  Catheter placement by Ultrasound guidance. Heme positive aspiration all ports.   Vessel Caliber: small, patent  Needle advanced into vessel with real time Ultrasound guidance.Insertion Attempts: 1  Assessment  Dressing: secured with tape and tegaderm  Patient: Tolerated well

## 2025-02-20 ENCOUNTER — TELEPHONE (OUTPATIENT)
Dept: ALLERGY | Facility: CLINIC | Age: 70
End: 2025-02-20
Payer: MEDICARE

## 2025-02-20 VITALS
HEART RATE: 89 BPM | HEIGHT: 61 IN | WEIGHT: 101 LBS | DIASTOLIC BLOOD PRESSURE: 63 MMHG | RESPIRATION RATE: 20 BRPM | OXYGEN SATURATION: 97 % | BODY MASS INDEX: 19.07 KG/M2 | TEMPERATURE: 99 F | SYSTOLIC BLOOD PRESSURE: 144 MMHG

## 2025-02-20 LAB
ANION GAP SERPL CALC-SCNC: 7 MMOL/L (ref 8–16)
BASOPHILS # BLD AUTO: 0.01 K/UL (ref 0–0.2)
BASOPHILS NFR BLD: 0.1 % (ref 0–1.9)
BUN SERPL-MCNC: 5 MG/DL (ref 8–23)
CALCIUM SERPL-MCNC: 7.5 MG/DL (ref 8.7–10.5)
CHLORIDE SERPL-SCNC: 105 MMOL/L (ref 95–110)
CO2 SERPL-SCNC: 20 MMOL/L (ref 23–29)
CREAT SERPL-MCNC: 0.6 MG/DL (ref 0.5–1.4)
DIFFERENTIAL METHOD BLD: ABNORMAL
EOSINOPHIL # BLD AUTO: 0 K/UL (ref 0–0.5)
EOSINOPHIL NFR BLD: 0.1 % (ref 0–8)
ERYTHROCYTE [DISTWIDTH] IN BLOOD BY AUTOMATED COUNT: 14.3 % (ref 11.5–14.5)
EST. GFR  (NO RACE VARIABLE): >60 ML/MIN/1.73 M^2
GLUCOSE SERPL-MCNC: 79 MG/DL (ref 70–110)
HCT VFR BLD AUTO: 28.4 % (ref 37–48.5)
HGB BLD-MCNC: 9.2 G/DL (ref 12–16)
IMM GRANULOCYTES # BLD AUTO: 0.03 K/UL (ref 0–0.04)
IMM GRANULOCYTES NFR BLD AUTO: 0.3 % (ref 0–0.5)
LYMPHOCYTES # BLD AUTO: 1.2 K/UL (ref 1–4.8)
LYMPHOCYTES NFR BLD: 12.2 % (ref 18–48)
MCH RBC QN AUTO: 31.3 PG (ref 27–31)
MCHC RBC AUTO-ENTMCNC: 32.4 G/DL (ref 32–36)
MCV RBC AUTO: 97 FL (ref 82–98)
MONOCYTES # BLD AUTO: 0.6 K/UL (ref 0.3–1)
MONOCYTES NFR BLD: 6.4 % (ref 4–15)
NEUTROPHILS # BLD AUTO: 7.8 K/UL (ref 1.8–7.7)
NEUTROPHILS NFR BLD: 80.9 % (ref 38–73)
NRBC BLD-RTO: 0 /100 WBC
PLATELET # BLD AUTO: 254 K/UL (ref 150–450)
PMV BLD AUTO: 8.9 FL (ref 9.2–12.9)
POTASSIUM SERPL-SCNC: 4.1 MMOL/L (ref 3.5–5.1)
RBC # BLD AUTO: 2.94 M/UL (ref 4–5.4)
SODIUM SERPL-SCNC: 132 MMOL/L (ref 136–145)
WBC # BLD AUTO: 9.59 K/UL (ref 3.9–12.7)

## 2025-02-20 PROCEDURE — 25000003 PHARM REV CODE 250

## 2025-02-20 PROCEDURE — 85025 COMPLETE CBC W/AUTO DIFF WBC: CPT | Performed by: STUDENT IN AN ORGANIZED HEALTH CARE EDUCATION/TRAINING PROGRAM

## 2025-02-20 PROCEDURE — 63600175 PHARM REV CODE 636 W HCPCS

## 2025-02-20 PROCEDURE — 94640 AIRWAY INHALATION TREATMENT: CPT

## 2025-02-20 PROCEDURE — 25000242 PHARM REV CODE 250 ALT 637 W/ HCPCS: Performed by: STUDENT IN AN ORGANIZED HEALTH CARE EDUCATION/TRAINING PROGRAM

## 2025-02-20 PROCEDURE — 99233 SBSQ HOSP IP/OBS HIGH 50: CPT | Mod: ,,, | Performed by: ANESTHESIOLOGY

## 2025-02-20 PROCEDURE — 94761 N-INVAS EAR/PLS OXIMETRY MLT: CPT

## 2025-02-20 PROCEDURE — 80048 BASIC METABOLIC PNL TOTAL CA: CPT | Performed by: STUDENT IN AN ORGANIZED HEALTH CARE EDUCATION/TRAINING PROGRAM

## 2025-02-20 PROCEDURE — 94799 UNLISTED PULMONARY SVC/PX: CPT

## 2025-02-20 PROCEDURE — 25000003 PHARM REV CODE 250: Performed by: STUDENT IN AN ORGANIZED HEALTH CARE EDUCATION/TRAINING PROGRAM

## 2025-02-20 RX ORDER — HEPARIN SODIUM 5000 [USP'U]/ML
5000 INJECTION, SOLUTION INTRAVENOUS; SUBCUTANEOUS EVERY 12 HOURS
Status: CANCELLED | OUTPATIENT
Start: 2025-02-20

## 2025-02-20 RX ORDER — HEPARIN SODIUM 5000 [USP'U]/ML
5000 INJECTION, SOLUTION INTRAVENOUS; SUBCUTANEOUS EVERY 12 HOURS
Status: DISCONTINUED | OUTPATIENT
Start: 2025-02-20 | End: 2025-02-20 | Stop reason: HOSPADM

## 2025-02-20 RX ORDER — VENLAFAXINE HYDROCHLORIDE 37.5 MG/1
112.5 CAPSULE, EXTENDED RELEASE ORAL NIGHTLY
Status: CANCELLED | OUTPATIENT
Start: 2025-02-20

## 2025-02-20 RX ORDER — OXYCODONE HYDROCHLORIDE 5 MG/1
5 TABLET ORAL EVERY 12 HOURS PRN
Qty: 10 TABLET | Refills: 0 | Status: SHIPPED | OUTPATIENT
Start: 2025-02-20

## 2025-02-20 RX ORDER — RISPERIDONE 1 MG/1
2 TABLET ORAL DAILY
Status: DISCONTINUED | OUTPATIENT
Start: 2025-02-20 | End: 2025-02-20 | Stop reason: HOSPADM

## 2025-02-20 RX ORDER — ACETAMINOPHEN 500 MG
500 TABLET ORAL EVERY 6 HOURS PRN
COMMUNITY
Start: 2025-02-20

## 2025-02-20 RX ORDER — PREGABALIN 25 MG/1
25 CAPSULE ORAL 2 TIMES DAILY
COMMUNITY
Start: 2025-02-20 | End: 2025-03-22

## 2025-02-20 RX ADMIN — RISPERIDONE 2 MG: 1 TABLET, FILM COATED ORAL at 08:02

## 2025-02-20 RX ADMIN — SENNOSIDES AND DOCUSATE SODIUM 1 TABLET: 50; 8.6 TABLET ORAL at 08:02

## 2025-02-20 RX ADMIN — ACETAMINOPHEN 650 MG: 325 TABLET ORAL at 06:02

## 2025-02-20 RX ADMIN — POLYETHYLENE GLYCOL 3350 17 G: 17 POWDER, FOR SOLUTION ORAL at 08:02

## 2025-02-20 RX ADMIN — ALBUTEROL SULFATE 2.5 MG: 2.5 SOLUTION RESPIRATORY (INHALATION) at 12:02

## 2025-02-20 RX ADMIN — MUPIROCIN: 20 OINTMENT TOPICAL at 08:02

## 2025-02-20 RX ADMIN — ALBUTEROL SULFATE 2.5 MG: 2.5 SOLUTION RESPIRATORY (INHALATION) at 07:02

## 2025-02-20 RX ADMIN — PREGABALIN 25 MG: 25 CAPSULE ORAL at 08:02

## 2025-02-20 RX ADMIN — ALBUTEROL SULFATE 2.5 MG: 2.5 SOLUTION RESPIRATORY (INHALATION) at 04:02

## 2025-02-20 RX ADMIN — BENZTROPINE MESYLATE 0.5 MG: 0.5 TABLET ORAL at 08:02

## 2025-02-20 RX ADMIN — ISOSORBIDE MONONITRATE 30 MG: 30 TABLET, EXTENDED RELEASE ORAL at 08:02

## 2025-02-20 RX ADMIN — VENLAFAXINE HYDROCHLORIDE 75 MG: 37.5 CAPSULE, EXTENDED RELEASE ORAL at 08:02

## 2025-02-20 RX ADMIN — HEPARIN SODIUM 5000 UNITS: 5000 INJECTION INTRAVENOUS; SUBCUTANEOUS at 08:02

## 2025-02-20 RX ADMIN — ASPIRIN 81 MG: 81 TABLET, COATED ORAL at 08:02

## 2025-02-20 NOTE — NURSING
Pt presents to SICU [id number] via bed from PACU, escorted by PACU staff.  Upon arrival, SICU monitoring placed.  Dr. Melendez present at bedside, aware of pt data.   Pt's daughter presents to bedside, and plan of care reviewed (with questions answered.  Verbalization of understanding obtained.  Emotional support offered.

## 2025-02-20 NOTE — PLAN OF CARE
[facility] - Surgical Intensive Care  Discharge Final Note    Primary Care Provider: Sadaf Vargas MD    Expected Discharge Date: 2/20/2025    Patient discharged to home via daughter transportation.     Patient's bedside nurse provided discharge instructions.    Discharge Plan A and Plan B have been determined by review of patient's clinical status, future medical and therapeutic needs, and coverage/benefits for post-acute care in coordination with multidisciplinary team members.        Final Discharge Note (most recent)       Final Note - 02/20/25 1010          Final Note    What phone number can be called within the next 1-3 days to see how you are doing after discharge? [phone]   [phone], pt's daughter                    Important Message from Medicare             Contact Info       SLIME Johnson III, MD   Specialty: Vascular Surgery    [address] [facility] [address]   Phone: [phone]       Next Steps: Follow up on 3/21/2025            Future Appointments   Date Time Provider Department Center   2/25/2025 10:00 AM Matthias Conn MD [facility] IM [place]   3/3/2025 11:40 AM Daniel Brar, FNP [facility] PAINMGT [facility]   3/10/2025 11:00 AM LAB, [facility] LAB [place]   3/11/2025  2:30 PM Nathaniel Goode MD [facility] ENT [place]   3/14/2025  1:30 PM Josefina Vega MD [facility] DERM [place]   3/21/2025 10:00 AM VASCULAR, LAB [facility] VASCLAB [facility]   3/21/2025 10:30 AM SLIME Johnson III, MD [facility] VASCSUR [facility]   3/21/2025 11:30 AM LAB, APPOINTMENT [place] [facility] VNP [facility]   4/10/2025  1:00 PM LAB, HEMONC CANCER BLDG [facility] LAB HO [facility]   4/10/2025  2:00 PM Cheyanne Smith, NP [facility] HEMONC3 [facility]   4/10/2025  3:30 PM Walt Zhnag, SHARONM [facility] POD [facility]   4/23/2025  1:00 PM NURSE 7, NOM CHEMO [facility] CHEMO [facility]   5/8/2025  3:00 PM Carol Ann Poe MD [facility] PSYCH [facility]   6/10/2025 11:00 AM LAB, [facility] LAB [facility]  post-discharge follow-up appointment and information added to AVS.     Gerhard Jenkins, RN, BSN  Case Management  [phone]

## 2025-02-20 NOTE — SUBJECTIVE & OBJECTIVE
Interval History/Significant Events: Mildly hypotensive to SBP 90s ovn but responded well to 500cc LR.       Objective:     Vital Signs (Most Recent):  Temp: 98.1 °F (36.7 °C) (02/20/25 0300)  Pulse: 68 (02/20/25 0545)  Resp: 18 (02/20/25 0545)  BP: (!) 147/62 (02/20/25 0530)  SpO2: 95 % (02/20/25 0545) Vital Signs (24h Range):  Temp:  [97.7 °F (36.5 °C)-98.7 °F (37.1 °C)] 98.1 °F (36.7 °C)  Pulse:  [53-90] 68  Resp:  [9-38] 18  SpO2:  [85 %-99 %] 95 %  BP: ()/(45-62) 147/62  Arterial Line BP: ()/(33-70) 72/47     Weight: 45.8 kg (101 lb)  Body mass index is 19.08 kg/m².      Intake/Output Summary (Last 24 hours) at 2/20/2025 0557  Last data filed at 2/20/2025 0400  Gross per 24 hour   Intake 2809.91 ml   Output 1300 ml   Net 1509.91 ml          Physical Exam  Constitutional:       Appearance: Normal appearance.   HENT:      Head: Normocephalic and atraumatic.   Eyes:      Extraocular Movements: Extraocular movements intact.   Neck:      Comments: Right neck dressing  Cardiovascular:      Rate and Rhythm: Normal rate.   Pulmonary:      Effort: Pulmonary effort is normal.   Neurological:      General: No focal deficit present.      Mental Status: She is alert and oriented to person, place, and time.   Psychiatric:         Mood and Affect: Mood normal.         Behavior: Behavior normal.         Thought Content: Thought content normal.            Vents:  Oxygen Concentration (%): 28 (02/20/25 0420)    Lines/Drains/Airways       Drain  Duration             Female External Urinary Catheter w/ Suction 02/19/25 1730 <1 day              Airway  Duration                  Airway - Non-Surgical 02/19/25 0810 <1 day              Peripheral Intravenous Line  Duration                  Peripheral IV - Single Lumen 02/19/25 0656 20 G Left;Posterior Hand <1 day         Peripheral IV - Single Lumen 02/19/25 1000 18 G Left Antecubital <1 day                    Significant Labs:    CBC/Anemia Profile:  Recent Labs   Lab  02/20/25  0309   WBC 9.59   HGB 9.2*   HCT 28.4*      MCV 97   RDW 14.3        Chemistries:  Recent Labs   Lab 02/20/25  0309   *   K 4.1      CO2 20*   BUN 5*   CREATININE 0.6   CALCIUM 7.5*       All pertinent labs within the past 24 hours have been reviewed.    Significant Imaging: I have reviewed all pertinent imaging results/findings within the past 24 hours.

## 2025-02-20 NOTE — DISCHARGE SUMMARY
[facility] - Surgical Intensive Care  Vascular Surgery  Discharge Summary      Patient Name: Bhavna Landry  MRN: [id number]  Admission Date: 2/19/2025  Hospital Length of Stay: 1 days  Discharge Date and Time:  02/20/2025 9:34 AM  Attending Physician: SLIME Johnson III, MD   Discharging Provider: Gisella Meredith MD  Primary Care Provider: Sadaf Vargas MD    HPI:   Bhavna Landry is a 69 y.o. female presenting to vascular clinic today for referral for right carotid artery stenosis, asymptomatic.  Pertinent medical history includes hypertension on 2 oral antihypertensive agents and chronic back pain being managed with serial epidural injections.  She is a long-time smoker, 50 years, 1 pack per day.  She is currently maintained on an aspirin and high-dose statin.  No history of MI or stroke.  She has never had neck radiation or neck surgery including the cervical spine.  She denies any unilateral weakness, sensory changes, visual changes, syncopal events.  She is otherwise active in her activities of daily living.     Procedure(s) (LRB):  ENDARTERECTOMY-CAROTID (Right)     Hospital Course: Please see the preoperative H&P and other available documentation for full details related to history prior to this admission.  Briefly, Bhavna Landry is a 69 y.o. female who was admitted following scheduled elective surgery for carotid stenosis.      Following a complete preoperative discussion of the risks and benefits of surgery with signed informed consent, the patient was taken to the operating room on 2/19/25 and underwent the above stated procedures. The patient tolerated surgery well and there were no complications. Please see the operative report for full intraoperative findings and details.       Postoperatively, the patient did well and was transferred from the PACU to the SICU in stable condition where they had a stable and uncomplicated hospital course.      Labs and vital signs remained stable and appropriate throughout  course. Diet was advanced as tolerated and the patient's pain was controlled on oral pain medications without problem. Currently, the patient is doing well and is stable and appropriate for discharge at this time. Discharge instructions discussed with patient at bedside, all questions and concerns addressed.        Goals of Care Treatment Preferences:  Code Status: Full Code      Consults:     Significant Diagnostic Studies: N/A    Pending Diagnostic Studies:       None          Final Active Diagnoses:    Diagnosis Date Noted POA    PRINCIPAL PROBLEM:  Bilateral carotid artery disease [I77.9] 11/20/2019 Yes    PAF (paroxysmal atrial fibrillation) [I48.0] 11/20/2019 Yes    Essential hypertension [I10] 07/23/2012 Yes     Chronic      Problems Resolved During this Admission:      Discharged Condition: good    Disposition: Home or Self Care    Follow Up:   Follow-up Information       SLIME Johnson III, MD Follow up on 3/21/2025.    Specialty: Vascular Surgery  Contact information:  [address]  [facility] [address]  [phone]                             Patient Instructions:      Notify your health care provider if you experience any of the following:  temperature >100.4     Notify your health care provider if you experience any of the following:  persistent nausea and vomiting or diarrhea     Notify your health care provider if you experience any of the following:  severe uncontrolled pain     Notify your health care provider if you experience any of the following:  redness, tenderness, or signs of infection (pain, swelling, redness, odor or green/yellow discharge around incision site)     Notify your health care provider if you experience any of the following:  difficulty breathing or increased cough     Notify your health care provider if you experience any of the following:  severe persistent headache     Notify your health care provider if you experience any of the following:  worsening rash     Notify  your health care provider if you experience any of the following:  persistent dizziness, light-headedness, or visual disturbances     Notify your health care provider if you experience any of the following:  increased confusion or weakness     Reason for not Prescribing Nicotine Replacement     Order Specific Question Answer Comments   Reason for not Prescribing: Not medically appropriate at this time      Reason for not Ordering Smoking Cessation Referral     Order Specific Question Answer Comments   Reason for not ordering: Not medically appropriate at this time      Medications:  Reconciled Home Medications:      Medication List        START taking these medications      acetaminophen 500 MG tablet  Commonly known as: TYLENOL  Take 1 tablet (500 mg total) by mouth every 6 (six) hours as needed.     oxyCODONE 5 MG immediate release tablet  Commonly known as: ROXICODONE  Take 1 tablet (5 mg total) by mouth every 12 (twelve) hours as needed.            CHANGE how you take these medications      atorvastatin 80 MG tablet  Commonly known as: LIPITOR  Take 1 tablet (80 mg total) by mouth once daily.  What changed: when to take this     exemestane 25 mg tablet  Commonly known as: AROMASIN  Take 1 tablet (25 mg total) by mouth once daily.  What changed: when to take this     * venlafaxine 37.5 MG 24 hr capsule  Commonly known as: EFFEXOR-XR  Take 1 capsule (37.5 mg total) by mouth once daily. Take with 75mg capsule to equal 112.5mg daily.  What changed: when to take this     * venlafaxine 75 MG 24 hr capsule  Commonly known as: EFFEXOR-XR  Take 1 capsule (75 mg total) by mouth once daily. Take with 37.5mg capsule to equal 112.5mg daily.  What changed: Another medication with the same name was changed. Make sure you understand how and when to take each.           * This list has 2 medication(s) that are the same as other medications prescribed for you. Read the directions carefully, and ask your doctor or other care  provider to review them with you.                CONTINUE taking these medications      * albuterol 90 mcg/actuation inhaler  Commonly known as: PROVENTIL/VENTOLIN HFA  USE 2 INHALATIONS BY MOUTH 4  TIMES DAILY AS NEEDED     * albuterol 2.5 mg /3 mL (0.083 %) nebulizer solution  Commonly known as: PROVENTIL  Take 3 mLs (2.5 mg total) by nebulization every 6 (six) hours as needed for Wheezing. Rescue     amLODIPine 5 MG tablet  Commonly known as: NORVASC  Take 1 tablet (5 mg total) by mouth 2 (two) times a day.     ascorbic acid (vitamin C) 500 MG tablet  Commonly known as: VITAMIN C  Take 500 mg by mouth once daily.     aspirin 81 MG EC tablet  Commonly known as: ECOTRIN  Take 81 mg by mouth once daily.     azelastine 137 mcg (0.1 %) nasal spray  Commonly known as: ASTELIN  1 spray (137 mcg total) by Nasal route 2 (two) times daily.     benztropine 0.5 MG tablet  Commonly known as: COGENTIN  Take 1 tablet (0.5 mg total) by mouth 2 (two) times daily.     fish oil-omega-3 fatty acids 300-1,000 mg capsule  Take by mouth once daily.     fluticasone propionate 50 mcg/actuation nasal spray  Commonly known as: FLONASE  SHAKE LIQUID AND USE 1 SPRAY(50 MCG) IN EACH NOSTRIL DAILY     folic acid 1 MG tablet  Commonly known as: FOLVITE  Take 1 tablet (1,000 mcg total) by mouth once daily.     isosorbide mononitrate 30 MG 24 hr tablet  Commonly known as: IMDUR  Take 1 tablet (30 mg total) by mouth once daily.     LIDOcaine 5 %  Commonly known as: LIDODERM  Place 1 patch onto the skin once daily. Remove & Discard patch within 12 hours or as directed by MD     meloxicam 7.5 MG tablet  Commonly known as: MOBIC  Take 1 tablet (7.5 mg total) by mouth once daily.     methotrexate 2.5 MG Tab  Take 8 tablets (20 mg total) by mouth every 7 days. TAKE 4 TABLETS MONDAY MORNING AND TAKE 4 TABLETS MONDAY NIGHT ONLY     omeprazole 40 MG capsule  Commonly known as: PRILOSEC  TAKE 1 CAPSULE(40 MG) BY MOUTH EVERY MORNING     pregabalin 25 MG  capsule  Commonly known as: LYRICA  Take 1 capsule (25 mg total) by mouth 2 (two) times daily.     * risperiDONE 4 MG tablet  Commonly known as: RISPERDAL  Take 1 tablet (4 mg total) by mouth every evening.     * risperiDONE 2 MG tablet  Commonly known as: RISPERDAL  TAKE 1 TABLET(2 MG) BY MOUTH EVERY MORNING     traMADoL 50 mg tablet  Commonly known as: ULTRAM  Take 1 tablet (50 mg total) by mouth every 8 (eight) hours as needed for Pain.     valsartan 160 MG tablet  Commonly known as: DIOVAN  Take 160 mg by mouth once daily. Patient can also take 80mg in the evening     vitamin D 1000 units Tab  Commonly known as: VITAMIN D3  Take 1,000 Units by mouth once daily.     vitamin E 200 UNIT capsule  Take 200 Units by mouth once daily.     zinc gluconate 50 mg tablet  Take 50 mg by mouth once daily.           * This list has 4 medication(s) that are the same as other medications prescribed for you. Read the directions carefully, and ask your doctor or other care provider to review them with you.                ASK your doctor about these medications      baclofen 10 MG tablet  Commonly known as: LIORESAL  1 tab po tid for muscle cramps.     HIZENTRA 4 gram/20 mL (20 %) Soln  Generic drug: immun glob G(IgG)-pro-IgA 0-50  Inject 60 mLs (12 g total) into the skin every 14 (fourteen) days.     HYDROcodone-acetaminophen 5-325 mg per tablet  Commonly known as: NORCO  Take 1 tablet by mouth every 8 (eight) hours as needed for Pain.              Gisella Meredith MD  Vascular Surgery  [facility] - Surgical Intensive Care

## 2025-02-20 NOTE — TELEPHONE ENCOUNTER
Attempted to contact Ms. Landry to give her information regarding grants she could apply to in order to help her afford her immunoglobulin infusions.    Aguilar Hernandez, PharmD  Clinical Pharmacist - Allergy/Pulmonary

## 2025-02-20 NOTE — TELEPHONE ENCOUNTER
"----- Message from Jeff Benítez MD sent at 2/18/2025  1:09 PM CST -----  Regarding: FW: Infusion Cost  Hi Aguilar,I initially tried to order subQ Ig (hizentra) for this patient, but she couldn't afford the $170 copays. So then we tried for IVIg (see below). Do you have any suggestions on how we can proceed for this patient?Thanks,Jeff Benítez, [facility]/Immunology  ----- Message -----  From: Geraldine Pro RN  Sent: 1/13/2025   9:45 AM CST  To: Jeff Benítez MD  Subject: Infusion Cost                                    Good Morning Dr. Benítez,    I wanted to let you know this patient finally reached back out to us, and stated that due to the cost she would endure for the Privigen, she will not be able to take it.  She said she reached out to Financial Assistance and they were of no help to her.  Do you all have a pharmacist or anyone in office that could possibly apply her for any "medication brand assistance" or anything like that?      If in the future she is able to get it, please just reach out to us directly since her plan is already in. It won't come to our WQ.    Thank you and have a good week,    ELAINE Chandler  Ambulatory Infusion  "

## 2025-02-20 NOTE — PLAN OF CARE
SICU PLAN OF CARE NOTE    Dx: Bilateral carotid artery disease    Shift Events: No acute events noted during shift. 500cc LR bolus given for SBP<100. Chichi d/c. Straight cath for urine retention. VSS. Plan of care discussed with patient and verbalized understanding.     Gtts: N/A    Neuro: AAO x4, Follows Commands, and Moves All Extremities    Cardiac: NSR - Sinus Enoch    Respiratory: Nasal Cannula 2L    GI: Cardiac Diet    : Voids Spontaneously 1300 cc/shift    Labs/Accuchecks: Daily labs    Skin: No skin breakdown noted during shift. SCDs on, bony prominences protected, mattress inflated and working. See flowsheet for further details.

## 2025-02-20 NOTE — PROGRESS NOTES
[facility] - Surgical Intensive Care  Critical Care - Surgery  History & Physical    Patient Name: Bhavna Landry  MRN: [id number]  Admission Date: 2/19/2025  Code Status: Full Code  Attending Physician: SLIME Johnson III, MD   Primary Care Provider: Sadaf Vargas MD   Principal Problem: Bilateral carotid artery disease    Subjective:     HPI:  Kosta Landry is a 69 year old female with a PMHx of HTN, GERD, schizophrenia, HLD, cervical spondylolysis. The patient presents to the SICU s/p Right carotid endarectomy with bovine patch with Dr. Johnson on 02/19/2025. On admission, maintain blood pressure at a SBP of 100-160. Access includes 2 PIV, and arterial access includes a left radial arterial line. Immediate post-operative plans include hemodynamic stabilization and closely monitor fluid status with strict Is/Os and continued fluid resuscitation as needed.      Hospital/ICU Course:  No notes on file    Interval History/Significant Events: Mildly hypotensive to SBP 90s ovn but responded well to 500cc LR. Straight cath ovn for urinary retention of 600cc.       Objective:     Vital Signs (Most Recent):  Temp: 98.1 °F (36.7 °C) (02/20/25 0300)  Pulse: 68 (02/20/25 0545)  Resp: 18 (02/20/25 0545)  BP: (!) 147/62 (02/20/25 0530)  SpO2: 95 % (02/20/25 0545) Vital Signs (24h Range):  Temp:  [97.7 °F (36.5 °C)-98.7 °F (37.1 °C)] 98.1 °F (36.7 °C)  Pulse:  [53-90] 68  Resp:  [9-38] 18  SpO2:  [85 %-99 %] 95 %  BP: ()/(45-62) 147/62  Arterial Line BP: ()/(33-70) 72/47     Weight: 45.8 kg (101 lb)  Body mass index is 19.08 kg/m².      Intake/Output Summary (Last 24 hours) at 2/20/2025 0557  Last data filed at 2/20/2025 0400  Gross per 24 hour   Intake 2809.91 ml   Output 1300 ml   Net 1509.91 ml          Physical Exam  Constitutional:       Appearance: Normal appearance.   HENT:      Head: Normocephalic and atraumatic.   Eyes:      Extraocular Movements: Extraocular movements intact.   Neck:      Comments: Right  neck dressing  Cardiovascular:      Rate and Rhythm: Normal rate.   Pulmonary:      Effort: Pulmonary effort is normal.   Neurological:      General: No focal deficit present.      Mental Status: She is alert and oriented to person, place, and time.   Psychiatric:         Mood and Affect: Mood normal.         Behavior: Behavior normal.         Thought Content: Thought content normal.            Vents:  Oxygen Concentration (%): 28 (02/20/25 0420)    Lines/Drains/Airways       Drain  Duration             Female External Urinary Catheter w/ Suction 02/19/25 1730 <1 day              Airway  Duration                  Airway - Non-Surgical 02/19/25 0810 <1 day              Peripheral Intravenous Line  Duration                  Peripheral IV - Single Lumen 02/19/25 0656 20 G Left;Posterior Hand <1 day         Peripheral IV - Single Lumen 02/19/25 1000 18 G Left Antecubital <1 day                    Significant Labs:    CBC/Anemia Profile:  Recent Labs   Lab 02/20/25  0309   WBC 9.59   HGB 9.2*   HCT 28.4*      MCV 97   RDW 14.3        Chemistries:  Recent Labs   Lab 02/20/25  0309   *   K 4.1      CO2 20*   BUN 5*   CREATININE 0.6   CALCIUM 7.5*       All pertinent labs within the past 24 hours have been reviewed.    Significant Imaging: I have reviewed all pertinent imaging results/findings within the past 24 hours.  Assessment/Plan:     Cardiac/Vascular  * Bilateral carotid artery disease  S/p R CEA w/ Dr. Johnson on 2/19.    Neuro/Psych:   - Sedation: none  - Pain: scheduled tylenol, pregabalin, benztropine. Prn Oxy 5  - risperidone qhs  - venlafaxine qd and qhs                Cardiac:   - BP Goal: -160  - Pressors: none  - Rhythm: NSR  - Anti-HTNs: amlodipine 5mg bid. Will hold for now in setting of intermittent hypotension post operatively  - Statin 80mg qd    Pulmonary:   - Goal SpO2 >92%  - Supplemental O2 as needed  - scheduled duonebs      Renal:      Recent Labs   Lab 02/20/25  0301    BUN 5*   CREATININE 0.6        - Record strict Is/Os    - Straight cath ovn with 600cc output    FEN / GI:     - Daily CMP, Mag/Phos    - Replace electrolytes as needed    - Nutrition: cardiac diet    - Bowel Regimen: Miralax, senna     ID:   - Afebrile  - Complete saskia-op ancef  Recent Labs   Lab 02/20/25  0309   WBC 9.59       Heme/Onc:   Recent Labs   Lab 02/20/25  0309   HGB 9.2*        - CBC daily  - DVT ppx: SQH  - ASA 81mg qd    Endocrine:   - Goal BG <180  - POCT Glucose prn  - SSI     PPx:   Feeding: regular diet  Analgesia/Sedation:  MMPC  Thromboembolic Prevention: SQH  HOB >30: Yes  Stress Ulcer: N/a  Glucose Control: BG Goal <180. SSI     Lines/Drains/Airway:  Left radial arterial line  PIV       Dispo/Code Status/Palliative:    - SD v discharge home   - Full Code         Sylwia Melendez MD  Critical Care - Surgery  [facility] - Surgical Intensive Care

## 2025-02-20 NOTE — HOSPITAL COURSE
Please see the preoperative H&P and other available documentation for full details related to history prior to this admission.  Briefly, Bhavna Landry is a 69 y.o. female who was admitted following scheduled elective surgery for carotid stenosis.      Following a complete preoperative discussion of the risks and benefits of surgery with signed informed consent, the patient was taken to the operating room on 2/19/25 and underwent the above stated procedures. The patient tolerated surgery well and there were no complications. Please see the operative report for full intraoperative findings and details.       Postoperatively, the patient did well and was transferred from the PACU to the SICU in stable condition where they had a stable and uncomplicated hospital course.      Labs and vital signs remained stable and appropriate throughout course. Diet was advanced as tolerated and the patient's pain was controlled on oral pain medications without problem. Currently, the patient is doing well and is stable and appropriate for discharge at this time. Discharge instructions discussed with patient at bedside, all questions and concerns addressed.

## 2025-02-20 NOTE — ASSESSMENT & PLAN NOTE
S/p R CEA w/ Dr. Johnson on 2/19.    Neuro/Psych:   - Sedation: none  - Pain: scheduled tylenol, pregabalin, benztropine. Prn Oxy 5  - risperidone qhs  - venlafaxine qd and qhs                Cardiac:   - BP Goal: -160  - Pressors: none  - Rhythm: NSR  - Anti-HTNs: amlodipine 5mg bid. Will hold for now in setting of intermittent hypotension post operatively  - Statin 80mg qd    Pulmonary:   - Goal SpO2 >92%  - Supplemental O2 as needed  - scheduled duonebs      Renal:      Recent Labs   Lab 02/20/25  0309   BUN 5*   CREATININE 0.6        - Record strict Is/Os    FEN / GI:     - Daily CMP, Mag/Phos    - Replace electrolytes as needed    - Nutrition: cardiac diet    - Bowel Regimen: Miralax, senna     ID:   - Afebrile  - Complete saskia-op ancef  Recent Labs   Lab 02/20/25  0309   WBC 9.59       Heme/Onc:   Recent Labs   Lab 02/20/25  0309   HGB 9.2*        - CBC daily  - DVT ppx: SQH  - ASA 81mg qd    Endocrine:   - Goal BG <180  - POCT Glucose prn  - SSI     PPx:   Feeding: regular diet  Analgesia/Sedation:  MMPC  Thromboembolic Prevention: SQH  HOB >30: Yes  Stress Ulcer: N/a  Glucose Control: BG Goal <180. SSI     Lines/Drains/Airway:  Left radial arterial line  PIV       Dispo/Code Status/Palliative:    - SICU   - Full Code

## 2025-02-20 NOTE — PLAN OF CARE
Patient cleared medically and has received discharge orders. CM spoke with nursing staff requesting to speak with patient prior to patient being discharged. CM arrived at patient bedside to discuss discharge plan, but patient was in wheelchair and refused to speak with CM regarding discharge planning. Unable to perform patient's initial assessment    Discharge Plan A and Plan B have been determined by review of patient's clinical status, future medical and therapeutic needs, and coverage/benefits for post-acute care in coordination with multidisciplinary team members.     Franco Mckoy RN-CM  Case Management  [facility]  [phone]

## 2025-02-20 NOTE — ANESTHESIA POSTPROCEDURE EVALUATION
Anesthesia Post Evaluation    Patient: Bhavna Landry    Procedure(s) Performed: Procedure(s) (LRB):  ENDARTERECTOMY-CAROTID (Right)    Final Anesthesia Type: general      Patient location during evaluation: PACU  Patient participation: Yes- Able to Participate  Level of consciousness: awake and alert  Post-procedure vital signs: reviewed and stable  Pain management: adequate  Airway patency: patent    PONV status at discharge: No PONV  Anesthetic complications: no      Cardiovascular status: blood pressure returned to baseline  Respiratory status: unassisted  Hydration status: euvolemic  Follow-up not needed.              Vitals Value Taken Time   /62 02/20/25 07:02   Temp 36.7 °C (98.1 °F) 02/20/25 03:00   Pulse 73 02/20/25 07:04   Resp 28 02/20/25 07:04   SpO2 96 % 02/20/25 07:04   Vitals shown include unfiled device data.      Event Time   Out of Recovery 17:15:00         Pain/Azra Score: Pain Rating Prior to Med Admin: 4 (2/20/2025  5:57 AM)  Pain Rating Post Med Admin: 2 (2/20/2025 12:00 AM)  Azra Score: 9 (2/19/2025 12:00 PM)

## 2025-02-20 NOTE — DISCHARGE INSTRUCTIONS
You are OK to shower, do not soak or submerge in water - no swimming or baths for 2 weeks.   If you notice clear drainage from your incision, you can apply dry gauze daily and secure in place with tape or gentle elastic wrap    Diet:  -Resume your pre-operative home diet    Follow up:  -Refer to follow up instructions     Call Vascular Surgery Office at [phone] if you experience:  -Increased redness, warmth, tenderness, or draining pus at your incision(s)  -Worsening fevers, chills, nausea/vomiting  -Pain, weakness, coldness, or numbness in your hand  -Uncontrolled pain  -Your call will be returned within 24 hours and further instructions will be provided    Go to ER/Urgent Care if you experience:  -Worsening shortness of breath or chest pain

## 2025-02-20 NOTE — NURSING
Discharge instructions printed and provided to pt and pt's daughter, with verbalization of understanding obtained.  Pt's daughter obtains home medications from pharmacy and pt and pt's daughter are aware of prescription instructions. Pt denies c/o pain or distress.  Pt escorted to pt's daughter's vehicle via wheelchair for discharge home.

## 2025-02-21 ENCOUNTER — PATIENT MESSAGE (OUTPATIENT)
Dept: VASCULAR SURGERY | Facility: CLINIC | Age: 70
End: 2025-02-21
Payer: MEDICARE

## 2025-02-21 NOTE — TELEPHONE ENCOUNTER
Attempted to contact pt in response to [facility] message stating she has been experiencing tingling in her right hand since last night which has occurred 3-4 times and improves when warm water is run over her hand. Voice message left for pt explaining that this could be related to anesthesia used during surgery or A-line placed in wrist, however if pt is experiencing paralysis, paresthesia, numbness, or weakness to only one side of her body she should report to ED. Return call requested for questions or concerns and explained that nurse will be in clinic until 1700 today.

## 2025-02-22 ENCOUNTER — NURSE TRIAGE (OUTPATIENT)
Dept: ADMINISTRATIVE | Facility: CLINIC | Age: 70
End: 2025-02-22
Payer: MEDICARE

## 2025-02-23 NOTE — TELEPHONE ENCOUNTER
Patient states she is s/p Vascular Right Carotid surgery on 02/19/25. Patient states her incision is currently closed with skin glue. Patient contacted the [facility] for care advice regarding post-discharge showering and use of a hand towel at the surgical site.     On Call Provider, Dr. Anu Sharma, advised that patient can shower but not vigorously scrub incision site while in the shower. Dr. Sharma also advised for patient to pat skin dry and that skin glue will peel off eventually.     Care advice per Dr. Sharma provided to patient and patient states understanding. Patient also advised to contact the [facility] for any additional questions/symptoms.     Reason for Disposition   [1] Caller requesting NON-URGENT health information AND [2] PCP's office is the best resource    Additional Information   Negative: [1] Caller is not with the adult (patient) AND [2] reporting urgent symptoms   Negative: Lab result questions   Negative: Medication questions   Negative: Caller can't be reached by phone   Negative: Caller has already spoken to PCP (doctor or NP/PA) or another triager   Negative: Triager needs further essential information from caller in order to complete triage    Protocols used: Information Only Call - No Triage-A-

## 2025-02-24 ENCOUNTER — PATIENT OUTREACH (OUTPATIENT)
Dept: ADMINISTRATIVE | Facility: CLINIC | Age: 70
End: 2025-02-24
Payer: MEDICARE

## 2025-02-24 NOTE — PROGRESS NOTES
C3 nurse attempted to contact Bhavna Landry for a TCC post hospital discharge follow up call. No answer. Left voicemail with callback information. The patient has a scheduled HOSFU appointment with Matthias Conn MD  on 02/25/25 @ 1000.

## 2025-02-25 ENCOUNTER — RESULTS FOLLOW-UP (OUTPATIENT)
Dept: INTERNAL MEDICINE | Facility: CLINIC | Age: 70
End: 2025-02-25

## 2025-02-25 ENCOUNTER — OFFICE VISIT (OUTPATIENT)
Dept: INTERNAL MEDICINE | Facility: CLINIC | Age: 70
End: 2025-02-25
Payer: MEDICARE

## 2025-02-25 ENCOUNTER — TELEPHONE (OUTPATIENT)
Dept: ALLERGY | Facility: CLINIC | Age: 70
End: 2025-02-25
Payer: MEDICARE

## 2025-02-25 VITALS
BODY MASS INDEX: 19.15 KG/M2 | HEART RATE: 72 BPM | TEMPERATURE: 98 F | DIASTOLIC BLOOD PRESSURE: 54 MMHG | SYSTOLIC BLOOD PRESSURE: 120 MMHG | WEIGHT: 101.44 LBS | OXYGEN SATURATION: 95 % | HEIGHT: 61 IN

## 2025-02-25 DIAGNOSIS — J32.9 BACTERIAL SINUSITIS: ICD-10-CM

## 2025-02-25 DIAGNOSIS — B96.89 BACTERIAL SINUSITIS: ICD-10-CM

## 2025-02-25 DIAGNOSIS — Z09 HOSPITAL DISCHARGE FOLLOW-UP: Primary | ICD-10-CM

## 2025-02-25 DIAGNOSIS — Z98.890 H/O CAROTID ENDARTERECTOMY: ICD-10-CM

## 2025-02-25 LAB
CTP QC/QA: YES
CTP QC/QA: YES
FLUAV AG NPH QL: NEGATIVE
FLUBV AG NPH QL: NEGATIVE
SARS CORONAVIRUS 2 ANTIGEN: NEGATIVE

## 2025-02-25 PROCEDURE — 99999 PR PBB SHADOW E&M-EST. PATIENT-LVL V: CPT | Mod: PBBFAC,,, | Performed by: INTERNAL MEDICINE

## 2025-02-25 RX ORDER — AMOXICILLIN AND CLAVULANATE POTASSIUM 875; 125 MG/1; MG/1
1 TABLET, FILM COATED ORAL 2 TIMES DAILY
Qty: 14 TABLET | Refills: 0 | Status: SHIPPED | OUTPATIENT
Start: 2025-02-25 | End: 2025-03-04

## 2025-02-25 RX ORDER — AZELASTINE 1 MG/ML
1 SPRAY, METERED NASAL 2 TIMES DAILY
Qty: 30 ML | Refills: 0 | Status: SHIPPED | OUTPATIENT
Start: 2025-02-25 | End: 2026-02-25

## 2025-02-25 NOTE — PROGRESS NOTES
2nd attempt made to reach patient for TCC call. Pt is unable to conduct TCC call at this time and requested callback in about an hour.

## 2025-02-25 NOTE — TELEPHONE ENCOUNTER
----- Message from Amber sent at 2/25/2025  2:47 PM CST -----  Type: General Call Back Name of Caller:Jah pt is calling to find out when she can be seen by doctor. She states that she had an appt but was told doctor would not be in on that date so she would like to know when she can have an appt.Would the patient rather a call back or a response via [facility]? Call Best Call Back Number:[phone] Information: please give pt a call in regards, thank you.

## 2025-02-25 NOTE — PROGRESS NOTES
C3 nurse spoke with Bhavna Landry for a TCC post hospital discharge follow up call. The patient has a scheduled HOSFU appointment with Matthias Conn MD today, 02/25/25.

## 2025-02-25 NOTE — PROGRESS NOTES
Assessment:       1. Hospital discharge follow-up    2. H/O carotid endarterectomy    3. Bacterial sinusitis  - azelastine (ASTELIN) 137 mcg (0.1 %) nasal spray; 1 spray (137 mcg total) by Nasal route 2 (two) times daily.  Dispense: 30 mL; Refill: 0  - POCT Influenza A/B Rapid Antigen  - SARS Coronavirus 2 Antigen, POCT Manual Read  - amoxicillin-clavulanate 875-125mg (AUGMENTIN) 875-125 mg per tablet; Take 1 tablet by mouth 2 (two) times daily. for 7 days  Dispense: 14 tablet; Refill: 0        Plan:       1/2.  Recovering from surgery.  Has follow-up appointment with vascular surgeon already scheduled.    3. Astelin refilled.  Check rapid flu and COVID swabs.  Augmentin 875 mg twice a day for 7 days sent to pharmacy.    Assessment & Plan    IMPRESSION:  1. Assessed patient post-carotid surgery follow-up  2. Evaluated symptoms suggestive of upper respiratory infection  3. Considered differential diagnoses including flu, COVID-19, and bacterial sinus infection  4. Will test for flu and COVID-19 due to elevated community prevalence  5. Determined treatment approach based on clinical presentation and ear exam  6. Noted blood pressure control appears adequate on current regimen    SINUS INFECTION:   Ms. Pierre reports sinus pressure, yellow and clear mucus, and coughing.   Suspect bacterial infection based on symptoms, but will test for influenza and COVID-19 to rule out other infections.   Prescribed Augmentin for suspected sinus infection.   Prescribed Flonase, a topical corticosteroid nasal spray, to be used daily.   Prescribed Astelin, a topical antihistamine nasal spray, to be used twice daily.   Educated the patient on the proper use of Flonase and Astelin nasal sprays to help manage symptoms.   Instructed the patient to continue using Flonase nasal spray daily.    INFLUENZA AND COVID-19 TESTING:   Ordered influenza and COVID-19 tests to be performed.   Performed flu and COVID-19 swab tests.   Contact the office  if flu or COVID-19 test results are positive for further instructions.    HYPERTENSION:   Ms. Pierre reports home blood pressure readings of approximately 120/80 mmHg to 130/80 mmHg.   Assessed that the current blood pressure readings are within acceptable range.   Determined that the prescribed antihypertensive medication is effective in managing the patient's blood pressure.    EAR INFECTION:   Ms. Pierre reports ear pain.   Examined the patient's ears and found signs of infection, with one ear appearing more severely affected.   Prescribed antibiotics for the ear infection.   Will follow up to assess the effectiveness of the antibiotic treatment.    FOLLOW UP:   Follow up on March 21st as scheduled.                 This note was generated with the assistance of ambient listening technology. Verbal consent was obtained by the patient and accompanying visitor(s) for the recording of patient appointment to facilitate this note. I attest to having reviewed and edited the generated note for accuracy, though some syntax or spelling errors may persist. Please contact the author of this note for any clarification.       Subjective:       Patient ID: Bhavna Pierre is a 69 y.o. female.    Chief Complaint: Hospital Follow Up    HPI    69-year-old female here for hospital follow-up.  Discharged 02/20/2025.    Family and/or Caretaker present at visit?  No.  Diagnostic tests reviewed/disposition: I have reviewed all completed as well as pending diagnostic tests at the time of discharge.  Disease/illness education:  Carotid endarterectomy  Home health/community services discussion/referrals: Patient does not have home health established from hospital visit.  They do not need home health.  If needed, we will set up home health for the patient.   Establishment or re-establishment of referral orders for community resources: No other necessary community resources.   Discussion with other health care providers: No discussion with  other health care providers necessary.  I reviewed and reconciled the medications from hospital discharge.    History of Present Illness    CHIEF COMPLAINT:  Ms. Pierre presents today for hospital follow-up after carotid surgery.    POST-OPERATIVE STATUS:  She underwent elective carotid surgery for significant carotid stenosis. Post-operative pain is improving.    CURRENT SYMPTOMS:  She reports sinus pressure and congestion localized to the head with productive cough of yellow and clear mucus. She also complains of ear discomfort. She denies fever or chills.    MEDICATIONS AND BLOOD PRESSURE:  She continues blood pressure medication with good effect, reporting home systolic readings between 120-130. She has Flonase at home and was prescribed Astelin on December 26.      ROS:  Constitutional: -fevers, -chills  ENT: +nasal congestion  Respiratory: +cough  Musculoskeletal: -muscle pain         Discharge summary:    HPI:   Bhavna Pierre is a 69 y.o. female presenting to vascular clinic today for referral for right carotid artery stenosis, asymptomatic.  Pertinent medical history includes hypertension on 2 oral antihypertensive agents and chronic back pain being managed with serial epidural injections.  She is a long-time smoker, 50 years, 1 pack per day.  She is currently maintained on an aspirin and high-dose statin.  No history of MI or stroke.  She has never had neck radiation or neck surgery including the cervical spine.  She denies any unilateral weakness, sensory changes, visual changes, syncopal events.  She is otherwise active in her activities of daily living.      Procedure(s) (LRB):  ENDARTERECTOMY-CAROTID (Right)      Hospital Course: Please see the preoperative H&P and other available documentation for full details related to history prior to this admission.  Briefly, Bhavna Pierre is a 69 y.o. female who was admitted following scheduled elective surgery for carotid stenosis.      Following a complete  preoperative discussion of the risks and benefits of surgery with signed informed consent, the patient was taken to the operating room on 2/19/25 and underwent the above stated procedures. The patient tolerated surgery well and there were no complications. Please see the operative report for full intraoperative findings and details.       Postoperatively, the patient did well and was transferred from the PACU to the SICU in stable condition where they had a stable and uncomplicated hospital course.      Labs and vital signs remained stable and appropriate throughout course. Diet was advanced as tolerated and the patient's pain was controlled on oral pain medications without problem. Currently, the patient is doing well and is stable and appropriate for discharge at this time. Discharge instructions discussed with patient at bedside, all questions and concerns addressed.      Review of Systems          Objective:      Physical Exam  Vitals reviewed.   Constitutional:       Appearance: She is well-developed.   HENT:      Head: Normocephalic and atraumatic.      Mouth/Throat:      Pharynx: No oropharyngeal exudate.   Eyes:      General: No scleral icterus.        Right eye: No discharge.         Left eye: No discharge.      Pupils: Pupils are equal, round, and reactive to light.   Neck:      Thyroid: No thyromegaly.      Trachea: No tracheal deviation.   Cardiovascular:      Rate and Rhythm: Normal rate and regular rhythm.      Heart sounds: Normal heart sounds. No murmur heard.     No friction rub. No gallop.   Pulmonary:      Effort: Pulmonary effort is normal. No respiratory distress.      Breath sounds: Normal breath sounds. No wheezing or rales.   Chest:      Chest wall: No tenderness.   Abdominal:      General: Bowel sounds are normal. There is no distension.      Palpations: Abdomen is soft. There is no mass.      Tenderness: There is no abdominal tenderness. There is no guarding or rebound.   Musculoskeletal:          General: No tenderness. Normal range of motion.      Cervical back: Normal range of motion and neck supple.   Skin:     General: Skin is warm and dry.      Coloration: Skin is not pale.      Findings: No erythema or rash.   Neurological:      Mental Status: She is alert and oriented to person, place, and time.   Psychiatric:         Behavior: Behavior normal.

## 2025-02-25 NOTE — TELEPHONE ENCOUNTER
Rt call to pt in regards to below message no answer lvm to give our office a call back.    Type: General Call Back Name of Caller:Jah pt is calling to find out when she can be seen by doctor. She states that she had an appt but was told doctor would not be in on that date so she would like to know when she can have an appt.Would the patient rather a call back or a response via [facility]? Call Best Call Back Number:[phone] Information: please give pt a call in regards, thank you.

## 2025-03-02 ENCOUNTER — PATIENT MESSAGE (OUTPATIENT)
Dept: PSYCHIATRY | Facility: CLINIC | Age: 70
End: 2025-03-02
Payer: MEDICARE

## 2025-03-02 DIAGNOSIS — R29.818 EXTRAPYRAMIDAL SYMPTOM: ICD-10-CM

## 2025-03-02 DIAGNOSIS — F20.0 PARANOID SCHIZOPHRENIA: ICD-10-CM

## 2025-03-03 DIAGNOSIS — I10 ESSENTIAL HYPERTENSION: Chronic | ICD-10-CM

## 2025-03-03 RX ORDER — RISPERIDONE 2 MG/1
TABLET ORAL
Qty: 90 TABLET | Refills: 3 | Status: SHIPPED | OUTPATIENT
Start: 2025-03-03

## 2025-03-03 RX ORDER — RISPERIDONE 4 MG/1
4 TABLET ORAL NIGHTLY
Qty: 90 TABLET | Refills: 3 | Status: SHIPPED | OUTPATIENT
Start: 2025-03-03

## 2025-03-03 RX ORDER — BENZTROPINE MESYLATE 0.5 MG/1
0.5 TABLET ORAL 2 TIMES DAILY
Qty: 180 TABLET | Refills: 3 | Status: SHIPPED | OUTPATIENT
Start: 2025-03-03

## 2025-03-03 NOTE — TELEPHONE ENCOUNTER
----- Message from Arista Power sent at 3/3/2025 11:00 AM CST -----  Requesting an RX refill or new RX.Is this a refill or new RX:  refillRX name and strength (copy/paste from chart):  amLODIPine (NORVASC) 5 MG tabletIs this a 30 day or 90 day RX: 90Pharmacy name and phone # (copy/paste from chart):  [id number] - [place] - [address] AT [facility] [id number] [place] [id number]: [phone] Fax: [phone] doctors have asked that we provide their patients with the following 2 reminders -- prescription refills can take up to 72 hours, and a friendly reminder that in the future you can use your [facility] account to request refills: y

## 2025-03-05 ENCOUNTER — TELEPHONE (OUTPATIENT)
Dept: INTERNAL MEDICINE | Facility: CLINIC | Age: 70
End: 2025-03-05
Payer: MEDICARE

## 2025-03-05 RX ORDER — ALBUTEROL SULFATE 90 UG/1
INHALANT RESPIRATORY (INHALATION)
Qty: 25.5 G | Refills: 3 | Status: SHIPPED | OUTPATIENT
Start: 2025-03-05

## 2025-03-05 RX ORDER — AMLODIPINE BESYLATE 5 MG/1
5 TABLET ORAL 2 TIMES DAILY
Qty: 180 TABLET | Refills: 3 | Status: SHIPPED | OUTPATIENT
Start: 2025-03-05

## 2025-03-05 NOTE — TELEPHONE ENCOUNTER
Provider Staff:  Action required. This patient has received emergency care.     Please schedule patient for a follow up appointment.     Thanks!  [facility]     Appointments      Date Provider   Last Visit   1/16/2025 Sadaf Vargas MD   Next Visit   Visit date not found Sadaf Vargas MD

## 2025-03-05 NOTE — TELEPHONE ENCOUNTER
No care due was identified.  [facility] Embedded Care Due Messages. Reference number: [id number].   3/05/2025 4:39:31 AM CST

## 2025-03-05 NOTE — TELEPHONE ENCOUNTER
Refill Decision Note   Bhavna Landry  is requesting a refill authorization.  Brief Assessment and Rationale for Refill:  Approve     Medication Therapy Plan:  Acute care/admission documentation reviewed. No change in therapy;      Extended chart review required: Yes   Comments:     Note composed:9:03 AM 03/05/2025

## 2025-03-05 NOTE — TELEPHONE ENCOUNTER
Refill Decision Note   Bhavna Landry  is requesting a refill authorization.  Brief Assessment and Rationale for Refill:  Approve     Medication Therapy Plan: EDv reviewed, no change to therapy      Extended chart review required: Yes   Comments:     Note composed:3:50 PM 03/05/2025

## 2025-03-05 NOTE — TELEPHONE ENCOUNTER
----- Message from Carol Ann Poe MD sent at 12/28/2023  9:13 AM CST -----  Regarding: RE: Medication  Please update her that she was actually on 150mg daily for many many years.  It was decreased down to 75mg after her sodium got low.  At last appointment she wanted to increase back to 150mg daily because her anxiety had worsened.  I am fine going back down to 75mg daily though if 150mg is making her tired.  I have updated prescription at pharmacy to 75mg daily.    ----- Message -----  From: Peggy Madrid, ELAINE  Sent: 12/27/2023   3:54 PM CST  To: Carol Ann Poe MD  Subject: FW: Medication                                     ----- Message -----  From: Claribel Gonzalez  Sent: 12/27/2023   3:43 PM CST  To: Peggy Madrid RN; Camilla Crespo RN  Subject: Medication                                       Patient is unable to use the Portal, but wants to know why Dr. Carol Ann Poe changed her medication, venlafaxine (EFFEXOR-XR) 150 MG Cp24 from 75 mgs.  Patient says she's been taking 75 mgs for years without complication and wants to know why the increase?    Last seen on 9/12/23 with no future scheduled appointments.    She can be reached at [phone].    Thank you.        
05-Mar-2025 12:01

## 2025-03-05 NOTE — TELEPHONE ENCOUNTER
Spoke with pt to inform her she should be re-evaluated for sinus infection symptoms; pt vu  Pt to be scheduled 3/6/25 @230p; requires override

## 2025-03-05 NOTE — TELEPHONE ENCOUNTER
Patient was treated for a sinus infection at the time I saw her.  If she is still having symptoms, should be re-evaluated.

## 2025-03-05 NOTE — TELEPHONE ENCOUNTER
No care due was identified.  [facility] Embedded Care Due Messages. Reference number: [id number].   3/05/2025 8:33:30 AM CST

## 2025-03-06 ENCOUNTER — OFFICE VISIT (OUTPATIENT)
Dept: INTERNAL MEDICINE | Facility: CLINIC | Age: 70
End: 2025-03-06
Payer: MEDICARE

## 2025-03-06 ENCOUNTER — TELEPHONE (OUTPATIENT)
Dept: INTERNAL MEDICINE | Facility: CLINIC | Age: 70
End: 2025-03-06
Payer: MEDICARE

## 2025-03-06 VITALS
OXYGEN SATURATION: 96 % | WEIGHT: 99.63 LBS | TEMPERATURE: 98 F | BODY MASS INDEX: 18.81 KG/M2 | HEIGHT: 61 IN | HEART RATE: 94 BPM

## 2025-03-06 DIAGNOSIS — G89.29 OTHER CHRONIC PAIN: Primary | ICD-10-CM

## 2025-03-06 DIAGNOSIS — D83.9 COMMON VARIABLE IMMUNODEFICIENCY: ICD-10-CM

## 2025-03-06 DIAGNOSIS — J32.9 SINUSITIS, UNSPECIFIED CHRONICITY, UNSPECIFIED LOCATION: ICD-10-CM

## 2025-03-06 PROCEDURE — 99999 PR PBB SHADOW E&M-EST. PATIENT-LVL V: CPT | Mod: PBBFAC,,, | Performed by: INTERNAL MEDICINE

## 2025-03-06 RX ORDER — CEFIXIME 400 MG/1
CAPSULE ORAL
Qty: 10 CAPSULE | Refills: 0 | Status: SHIPPED | OUTPATIENT
Start: 2025-03-06 | End: 2025-03-07 | Stop reason: SDUPTHER

## 2025-03-06 RX ORDER — TRAMADOL HYDROCHLORIDE 50 MG/1
50 TABLET ORAL EVERY 8 HOURS PRN
Qty: 30 TABLET | Refills: 0 | Status: SHIPPED | OUTPATIENT
Start: 2025-03-06

## 2025-03-06 RX ORDER — CLINDAMYCIN HYDROCHLORIDE 150 MG/1
150 CAPSULE ORAL 3 TIMES DAILY
Qty: 21 CAPSULE | Refills: 0 | Status: SHIPPED | OUTPATIENT
Start: 2025-03-06

## 2025-03-06 RX ORDER — CEFTRIAXONE 1 G/1
1 INJECTION, POWDER, FOR SOLUTION INTRAMUSCULAR; INTRAVENOUS
Status: COMPLETED | OUTPATIENT
Start: 2025-03-06 | End: 2025-03-06

## 2025-03-06 RX ADMIN — CEFTRIAXONE 1 G: 1 INJECTION, POWDER, FOR SOLUTION INTRAMUSCULAR; INTRAVENOUS at 04:03

## 2025-03-06 NOTE — PROGRESS NOTES
Subjective:       Patient ID: Bhavna Landry is a 69 y.o. female.    Chief Complaint: Sinus Problem, Headache, and Nasal Congestion    Sinus Problem  Associated symptoms include headaches. Pertinent negatives include no shortness of breath (PND or orthopnea).   Headache   Pertinent negatives include no abdominal pain, nausea, seizures or vomiting.    Pt with a hx of common variable immunodeficiency not on any immunoglobulin.  She had a sinus infection in December treated with levaquin.  She had a CEA 2 weeks ago and had a sinus infection shortly after her discharge.  Treated with augmentin with little improvement.  No fever or chills or sweats.  She is having a HA and yellow nasal mucous.  No blood in the mucous.  Some facial pain on the R and sore throat on the R.  Mild cough no SOB.  Eating and drinking normally. She has a hx of recurrent sinus infections.  Review of Systems   Respiratory:  Negative for shortness of breath (PND or orthopnea).    Cardiovascular:  Negative for chest pain (arm pain or jaw pain).   Gastrointestinal:  Negative for abdominal pain, diarrhea, nausea and vomiting.   Genitourinary:  Negative for dysuria.   Neurological:  Positive for headaches. Negative for seizures and syncope.       Objective:      Physical Exam  Constitutional:       General: She is not in acute distress.     Appearance: She is well-developed.   HENT:      Head: Normocephalic.      Comments: Slight swelling in R cheek - no eye swelling     Ears:      Comments: R TM red with fluid behind it and L TM with fluid behind it     Mouth/Throat:      Mouth: Mucous membranes are moist.      Pharynx: No oropharyngeal exudate or posterior oropharyngeal erythema.   Eyes:      Pupils: Pupils are equal, round, and reactive to light.   Neck:      Thyroid: No thyromegaly.      Vascular: No JVD.      Comments: R side of neck slightly swollen most likely from surgery - wound looks good no erythema  Cardiovascular:      Rate and Rhythm:  Normal rate and regular rhythm.      Heart sounds: Normal heart sounds. No murmur heard.     No friction rub. No gallop.   Pulmonary:      Effort: Pulmonary effort is normal.      Breath sounds: Normal breath sounds. No wheezing or rales.   Abdominal:      General: Bowel sounds are normal. There is no distension.      Palpations: Abdomen is soft. There is no mass.      Tenderness: There is no abdominal tenderness. There is no guarding or rebound.   Musculoskeletal:      Cervical back: Neck supple.   Lymphadenopathy:      Cervical: No cervical adenopathy.   Skin:     General: Skin is warm and dry.   Neurological:      Mental Status: She is alert and oriented to person, place, and time.      Deep Tendon Reflexes: Reflexes are normal and symmetric.   Psychiatric:         Behavior: Behavior normal.         Thought Content: Thought content normal.         Judgment: Judgment normal.         Assessment:       1. Other chronic pain    2. Common variable immunodeficiency    3. Sinusitis, unspecified chronicity, unspecified location        Plan:   Other chronic pain  -     traMADoL (ULTRAM) 50 mg tablet; Take 1 tablet (50 mg total) by mouth every 8 (eight) hours as needed for Pain.  Dispense: 30 tablet; Refill: 0    Common variable immunodeficiency  Sinusitis, unspecified chronicity, unspecified location    Other orders  -     cefTRIAXone injection 1 g  -     cefixime (SUPRAX) 400 mg Cap; One daily  Dispense: 10 capsule; Refill: 0  -     clindamycin (CLEOCIN) 150 MG capsule; Take 1 capsule (150 mg total) by mouth 3 (three) times daily.  Dispense: 21 capsule; Refill: 0    Lab tomorrow due to late hour   ED for worsening symptoms

## 2025-03-06 NOTE — TELEPHONE ENCOUNTER
----- Message from Candi sent at 3/6/2025 12:06 PM CST -----  Contact: [phone] Patient  .1MEDICALADVICE Patient is calling for Medical Advice regarding: Pt called to state she only called for the appt time but somehow the appt got cancelled. Pt would like to know if she can still be seen today - due to transportation lined up already.How long has patient had these symptoms:Pharmacy name and phone#:Patient wants a call back or thru [facility]: call back Comments: Pt is upset that the appt was cancelled w/out her knowledge - she needs to be seen today. Please advise patient replies from provider may take up to 48 hours.

## 2025-03-07 ENCOUNTER — LAB VISIT (OUTPATIENT)
Dept: LAB | Facility: HOSPITAL | Age: 70
End: 2025-03-07
Attending: INTERNAL MEDICINE
Payer: MEDICARE

## 2025-03-07 ENCOUNTER — TELEPHONE (OUTPATIENT)
Dept: INTERNAL MEDICINE | Facility: CLINIC | Age: 70
End: 2025-03-07
Payer: MEDICARE

## 2025-03-07 ENCOUNTER — HOSPITAL ENCOUNTER (OUTPATIENT)
Dept: RADIOLOGY | Facility: HOSPITAL | Age: 70
Discharge: HOME | End: 2025-03-07
Attending: INTERNAL MEDICINE
Payer: MEDICARE

## 2025-03-07 DIAGNOSIS — R51.9 FACE PAIN: ICD-10-CM

## 2025-03-07 DIAGNOSIS — R51.9 FACE PAIN: Primary | ICD-10-CM

## 2025-03-07 DIAGNOSIS — J32.0 CHRONIC MAXILLARY SINUSITIS: ICD-10-CM

## 2025-03-07 DIAGNOSIS — L40.50 PSA (PSORIATIC ARTHRITIS): ICD-10-CM

## 2025-03-07 LAB
ALBUMIN SERPL BCP-MCNC: 3.3 G/DL (ref 3.5–5.2)
ALP SERPL-CCNC: 59 U/L (ref 40–150)
ALT SERPL W/O P-5'-P-CCNC: 5 U/L (ref 10–44)
ANION GAP SERPL CALC-SCNC: 7 MMOL/L (ref 8–16)
AST SERPL-CCNC: 37 U/L (ref 10–40)
BASOPHILS # BLD AUTO: 0.04 K/UL (ref 0–0.2)
BASOPHILS NFR BLD: 0.4 % (ref 0–1.9)
BILIRUB SERPL-MCNC: 0.2 MG/DL (ref 0.1–1)
BUN SERPL-MCNC: 11 MG/DL (ref 8–23)
CALCIUM SERPL-MCNC: 9.5 MG/DL (ref 8.7–10.5)
CHLORIDE SERPL-SCNC: 102 MMOL/L (ref 95–110)
CO2 SERPL-SCNC: 26 MMOL/L (ref 23–29)
CREAT SERPL-MCNC: 0.8 MG/DL (ref 0.5–1.4)
CRP SERPL-MCNC: 0.6 MG/L (ref 0–8.2)
DIFFERENTIAL METHOD BLD: ABNORMAL
EOSINOPHIL # BLD AUTO: 0.3 K/UL (ref 0–0.5)
EOSINOPHIL NFR BLD: 2.9 % (ref 0–8)
ERYTHROCYTE [DISTWIDTH] IN BLOOD BY AUTOMATED COUNT: 14.9 % (ref 11.5–14.5)
ERYTHROCYTE [SEDIMENTATION RATE] IN BLOOD BY PHOTOMETRIC METHOD: 14 MM/HR (ref 0–36)
EST. GFR  (NO RACE VARIABLE): >60 ML/MIN/1.73 M^2
GLUCOSE SERPL-MCNC: 108 MG/DL (ref 70–110)
HCT VFR BLD AUTO: 34.8 % (ref 37–48.5)
HGB BLD-MCNC: 10.7 G/DL (ref 12–16)
IMM GRANULOCYTES # BLD AUTO: 0.03 K/UL (ref 0–0.04)
IMM GRANULOCYTES NFR BLD AUTO: 0.3 % (ref 0–0.5)
LYMPHOCYTES # BLD AUTO: 1.6 K/UL (ref 1–4.8)
LYMPHOCYTES NFR BLD: 17.6 % (ref 18–48)
MCH RBC QN AUTO: 31.2 PG (ref 27–31)
MCHC RBC AUTO-ENTMCNC: 30.7 G/DL (ref 32–36)
MCV RBC AUTO: 102 FL (ref 82–98)
MONOCYTES # BLD AUTO: 0.5 K/UL (ref 0.3–1)
MONOCYTES NFR BLD: 5.1 % (ref 4–15)
NEUTROPHILS # BLD AUTO: 6.6 K/UL (ref 1.8–7.7)
NEUTROPHILS NFR BLD: 73.7 % (ref 38–73)
NRBC BLD-RTO: 0 /100 WBC
PLATELET # BLD AUTO: 402 K/UL (ref 150–450)
PMV BLD AUTO: 8.8 FL (ref 9.2–12.9)
POTASSIUM SERPL-SCNC: 4.9 MMOL/L (ref 3.5–5.1)
PROT SERPL-MCNC: 6.2 G/DL (ref 6–8.4)
RBC # BLD AUTO: 3.43 M/UL (ref 4–5.4)
SODIUM SERPL-SCNC: 135 MMOL/L (ref 136–145)
WBC # BLD AUTO: 8.97 K/UL (ref 3.9–12.7)

## 2025-03-07 PROCEDURE — 70450 CT HEAD/BRAIN W/O DYE: CPT | Mod: 26,,, | Performed by: RADIOLOGY

## 2025-03-07 PROCEDURE — 36415 COLL VENOUS BLD VENIPUNCTURE: CPT | Mod: PO | Performed by: INTERNAL MEDICINE

## 2025-03-07 PROCEDURE — 80053 COMPREHEN METABOLIC PANEL: CPT | Performed by: INTERNAL MEDICINE

## 2025-03-07 PROCEDURE — 85652 RBC SED RATE AUTOMATED: CPT | Performed by: INTERNAL MEDICINE

## 2025-03-07 PROCEDURE — 70491 CT SOFT TISSUE NECK W/DYE: CPT | Mod: TC

## 2025-03-07 PROCEDURE — 85025 COMPLETE CBC W/AUTO DIFF WBC: CPT | Performed by: INTERNAL MEDICINE

## 2025-03-07 PROCEDURE — 70486 CT MAXILLOFACIAL W/O DYE: CPT | Mod: TC

## 2025-03-07 PROCEDURE — 70486 CT MAXILLOFACIAL W/O DYE: CPT | Mod: 26,,, | Performed by: RADIOLOGY

## 2025-03-07 PROCEDURE — 86140 C-REACTIVE PROTEIN: CPT | Performed by: INTERNAL MEDICINE

## 2025-03-07 PROCEDURE — 70491 CT SOFT TISSUE NECK W/DYE: CPT | Mod: 26,,, | Performed by: RADIOLOGY

## 2025-03-07 PROCEDURE — 70450 CT HEAD/BRAIN W/O DYE: CPT | Mod: TC

## 2025-03-07 PROCEDURE — 25500020 PHARM REV CODE 255: Performed by: INTERNAL MEDICINE

## 2025-03-07 RX ORDER — MELOXICAM 7.5 MG/1
7.5 TABLET ORAL DAILY
Qty: 10 TABLET | Refills: 0 | Status: SHIPPED | OUTPATIENT
Start: 2025-03-07

## 2025-03-07 RX ORDER — CEFIXIME 400 MG/1
CAPSULE ORAL
Qty: 10 CAPSULE | Refills: 0 | Status: SHIPPED | OUTPATIENT
Start: 2025-03-07

## 2025-03-07 RX ADMIN — IOHEXOL 75 ML: 350 INJECTION, SOLUTION INTRAVENOUS at 04:03

## 2025-03-07 NOTE — TELEPHONE ENCOUNTER
----- Message from Candi sent at 3/6/2025 12:20 PM CST -----  Contact: [phone] Patient  .1MEDICALADVICE Patient is calling for Medical Advice regarding: Pt experiencing sinus issues and would like to know if an antibiotic called over. How long has patient had these symptoms:Pharmacy name and phone#: [id number] - [place] - [address] AT [facility] [id number] [place] [id number]: [phone] Fax: [phone] wants a call back or thru [facility]: call back Comments: Pt doesn't want insion to get infected - it would be great to complete today!Please advise patient replies from provider may take up to 48 hours.

## 2025-03-08 ENCOUNTER — RESULTS FOLLOW-UP (OUTPATIENT)
Dept: RHEUMATOLOGY | Facility: CLINIC | Age: 70
End: 2025-03-08

## 2025-03-08 NOTE — TELEPHONE ENCOUNTER
SPoke to patient - still having a HA - Normal WBC, ESR and CRP.  CT head, ct sinus - clear.  CT neck some appropriate fluid collection over carotid most likely post operative. Complete antibiotics and try meloxicam for HA.

## 2025-03-11 ENCOUNTER — OFFICE VISIT (OUTPATIENT)
Dept: OTOLARYNGOLOGY | Facility: CLINIC | Age: 70
End: 2025-03-11
Payer: MEDICARE

## 2025-03-11 VITALS
DIASTOLIC BLOOD PRESSURE: 63 MMHG | WEIGHT: 102.31 LBS | HEIGHT: 61 IN | SYSTOLIC BLOOD PRESSURE: 115 MMHG | HEART RATE: 89 BPM | BODY MASS INDEX: 19.32 KG/M2

## 2025-03-11 DIAGNOSIS — J32.8 OTHER CHRONIC SINUSITIS: ICD-10-CM

## 2025-03-11 DIAGNOSIS — J31.0 CHRONIC RHINITIS: Primary | ICD-10-CM

## 2025-03-11 PROCEDURE — 3078F DIAST BP <80 MM HG: CPT | Mod: CPTII,S$GLB,, | Performed by: OTOLARYNGOLOGY

## 2025-03-11 PROCEDURE — 31231 NASAL ENDOSCOPY DX: CPT | Mod: S$GLB,,, | Performed by: OTOLARYNGOLOGY

## 2025-03-11 PROCEDURE — 1101F PT FALLS ASSESS-DOCD LE1/YR: CPT | Mod: CPTII,S$GLB,, | Performed by: OTOLARYNGOLOGY

## 2025-03-11 PROCEDURE — 4010F ACE/ARB THERAPY RXD/TAKEN: CPT | Mod: CPTII,S$GLB,, | Performed by: OTOLARYNGOLOGY

## 2025-03-11 PROCEDURE — 3074F SYST BP LT 130 MM HG: CPT | Mod: CPTII,S$GLB,, | Performed by: OTOLARYNGOLOGY

## 2025-03-11 PROCEDURE — 99214 OFFICE O/P EST MOD 30 MIN: CPT | Mod: 25,S$GLB,, | Performed by: OTOLARYNGOLOGY

## 2025-03-11 PROCEDURE — 87102 FUNGUS ISOLATION CULTURE: CPT | Performed by: OTOLARYNGOLOGY

## 2025-03-11 PROCEDURE — 1160F RVW MEDS BY RX/DR IN RCRD: CPT | Mod: CPTII,S$GLB,, | Performed by: OTOLARYNGOLOGY

## 2025-03-11 PROCEDURE — 1125F AMNT PAIN NOTED PAIN PRSNT: CPT | Mod: CPTII,S$GLB,, | Performed by: OTOLARYNGOLOGY

## 2025-03-11 PROCEDURE — 3008F BODY MASS INDEX DOCD: CPT | Mod: CPTII,S$GLB,, | Performed by: OTOLARYNGOLOGY

## 2025-03-11 PROCEDURE — 87070 CULTURE OTHR SPECIMN AEROBIC: CPT | Performed by: OTOLARYNGOLOGY

## 2025-03-11 PROCEDURE — 87075 CULTR BACTERIA EXCEPT BLOOD: CPT | Performed by: OTOLARYNGOLOGY

## 2025-03-11 PROCEDURE — 99999 PR PBB SHADOW E&M-EST. PATIENT-LVL V: CPT | Mod: PBBFAC,,, | Performed by: OTOLARYNGOLOGY

## 2025-03-11 PROCEDURE — 1159F MED LIST DOCD IN RCRD: CPT | Mod: CPTII,S$GLB,, | Performed by: OTOLARYNGOLOGY

## 2025-03-11 PROCEDURE — 3288F FALL RISK ASSESSMENT DOCD: CPT | Mod: CPTII,S$GLB,, | Performed by: OTOLARYNGOLOGY

## 2025-03-11 PROCEDURE — 1111F DSCHRG MED/CURRENT MED MERGE: CPT | Mod: CPTII,S$GLB,, | Performed by: OTOLARYNGOLOGY

## 2025-03-11 NOTE — PROCEDURES
Nasal/sinus endoscopy    Date/Time: 3/11/2025 2:30 PM    Performed by: Nathaniel Goode MD  Authorized by: Nathaniel Goode MD    Anesthesia:     Local anesthetic:  Lidocaine 4% and Chad-Synephrine 1/2%    Patient tolerance:  Patient tolerated the procedure well with no immediate complications  Nose:     Procedure Performed:  Nasal Endoscopy  External:      No external nasal deformity  Intranasal:      Mucosa no polyps     Mucosa ulcers not present     No mucosa lesions present     Turbinates not enlarged     No septum gross deformity  Nasopharynx:      No mucosa lesions     Adenoids not present     Posterior choanae patent     Eustachian tube not patent     Scanty yellow mucus in left nasal cavity  White crusty film on right MT head, swabbed for culture  Right maxillary patent

## 2025-03-11 NOTE — PROGRESS NOTES
"  Subjective:      Bhavna is a 69 y.o. female who comes for follow-up of sinusitis. Her last visit with me was on 7/11/2023. Now nearly 4 years status-post endoscopic sinus surgery.  Fine until 3 months ago, now persistent pressure and congestion in right cheek and forehead, right ear pain, occasional white or yellow discharge, using flonase and astelin.  Took 10 days of Augmentin last month, given mobic and tramadol last week.  Had right carotid endarterectomy several weeks ago.        %       The patient's medications, allergies, past medical, surgical, social and family histories were reviewed and updated as appropriate.    A detailed review of systems was obtained with pertinent positives as per the above HPI, and otherwise negative.        Objective:     /63 (BP Location: Left arm, Patient Position: Sitting)   Pulse 89   Ht 5' 1" (1.549 m)   Wt 46.4 kg (102 lb 4.7 oz)   BMI 19.33 kg/m²        Constitutional:   She appears well-developed. She is cooperative. Normal speech.  No hoarse voice.      Head:  Normocephalic. Salivary glands normal.  Facial strength is normal.      Ears:    Right Ear: No drainage or tenderness. Tympanic membrane is not perforated. Tympanic membrane mobility is normal. No middle ear effusion. No decreased hearing is noted.   Left Ear: No drainage or tenderness. Tympanic membrane is not perforated. Tympanic membrane mobility is normal.  No middle ear effusion. No decreased hearing is noted.     Nose:  No mucosal edema, rhinorrhea, septal deviation or polyps. No epistaxis. Turbinates normal, no turbinate masses and no turbinate hypertrophy.  Right sinus exhibits no maxillary sinus tenderness and no frontal sinus tenderness. Left sinus exhibits no maxillary sinus tenderness and no frontal sinus tenderness.     Mouth/Throat  Oropharynx clear and moist without lesions or asymmetry and normal uvula midline. She does not have dentures. Normal dentition. No oral lesions or mucous " membrane lesions. No oropharyngeal exudate or posterior oropharyngeal erythema. Mirror exam not performed due to patient tolerance.  Mirror exam not performed due to patient tolerance.      Neck:  Neck normal without thyromegaly masses, asymmetry, normal tracheal structure, crepitus, and tenderness, thyroid normal, trachea normal and no adenopathy. Normal range of motion present.     She has no cervical adenopathy.   Right  incision intact, nontender     Cardiovascular:    Regular rhythm.              Pulmonary/Chest:   Effort normal.     Psychiatric:   She has a normal mood and affect. Her speech is normal and behavior is normal.     Neurological:   No cranial nerve deficit.     Skin:   No rash noted.       Procedure    Nasal endoscopy performed.  See procedure note.        Data Reviewed    WBC (K/uL)   Date Value   2025 8.97     Eosinophil % (%)   Date Value   2025 2.9     Eos, Fluid (%)   Date Value   2014 5     Eos # (K/uL)   Date Value   2025 0.3     Platelets (K/uL)   Date Value   2025 402     Glucose (mg/dL)   Date Value   2025 108     Total IgE (IU/mL)   Date Value   2024 <35       Pathology report indicated non-eosinophilic chronic inflammation.  Cultures showed Curvularia.    I independently reviewed the images of the CT sinuses dated 3/7/25. Pertinent findings include clear sinuses with patent hypoplastic right maxillary.    Comparison with scan from Dec 2024 that shows resolution of focal right maxillary opacification.      Assessment:     1. Chronic rhinitis    2. Other chronic sinusitis         Plan:     Cultures taken, will consider topical antibiotics.  Ear pain likely referred from carotid site.  Follow up for test results.

## 2025-03-13 LAB — FUNGUS SPEC CULT: NORMAL

## 2025-03-14 ENCOUNTER — OFFICE VISIT (OUTPATIENT)
Dept: DERMATOLOGY | Facility: CLINIC | Age: 70
End: 2025-03-14
Payer: MEDICARE

## 2025-03-14 DIAGNOSIS — Z85.828 HISTORY OF SKIN CANCER: ICD-10-CM

## 2025-03-14 DIAGNOSIS — L57.0 MULTIPLE ACTINIC KERATOSES: Primary | ICD-10-CM

## 2025-03-14 DIAGNOSIS — L81.4 LENTIGINES: ICD-10-CM

## 2025-03-14 LAB — BACTERIA SPEC ANAEROBE CULT: NORMAL

## 2025-03-14 PROCEDURE — 99999 PR PBB SHADOW E&M-EST. PATIENT-LVL III: CPT | Mod: PBBFAC,,, | Performed by: DERMATOLOGY

## 2025-03-14 NOTE — PROGRESS NOTES
Subjective:      Patient ID:  Bhavna Landry is a 69 y.o. female who presents for   Chief Complaint   Patient presents with    Follow-up     UBSE     Follow-up      Review of Systems    Objective:   Physical Exam   Constitutional: She appears well-developed and well-nourished. No distress.   Neurological: She is alert and oriented to person, place, and time. She is not disoriented.   Psychiatric: She has a normal mood and affect.   Skin:   Areas Examined (abnormalities noted in diagram):   Scalp / Hair Palpated and Inspected  Head / Face Inspection Performed  Neck Inspection Performed  Chest / Axilla Inspection Performed  Abdomen Inspection Performed  Back Inspection Performed  RUE Inspected  LUE Inspection Performed  Nails and Digits Inspection Performed                 Diagram Legend     Erythematous scaling macule/papule c/w actinic keratosis       Vascular papule c/w angioma      Pigmented verrucoid papule/plaque c/w seborrheic keratosis      Yellow umbilicated papule c/w sebaceous hyperplasia      Irregularly shaped tan macule c/w lentigo     1-2 mm smooth white papules consistent with Milia      Movable subcutaneous cyst with punctum c/w epidermal inclusion cyst      Subcutaneous movable cyst c/w pilar cyst      Firm pink to brown papule c/w dermatofibroma      Pedunculated fleshy papule(s) c/w skin tag(s)      Evenly pigmented macule c/w junctional nevus     Mildly variegated pigmented, slightly irregular-bordered macule c/w mildly atypical nevus      Flesh colored to evenly pigmented papule c/w intradermal nevus       Pink pearly papule/plaque c/w basal cell carcinoma      Erythematous hyperkeratotic cursted plaque c/w SCC      Surgical scar with no sign of skin cancer recurrence      Open and closed comedones      Inflammatory papules and pustules      Verrucoid papule consistent consistent with wart     Erythematous eczematous patches and plaques     Dystrophic onycholytic nail with subungual debris  "c/w onychomycosis     Umbilicated papule    Erythematous-base heme-crusted tan verrucoid plaque consistent with inflamed seborrheic keratosis     Erythematous Silvery Scaling Plaque c/w Psoriasis     See annotation      Assessment / Plan:        Multiple actinic keratoses   Cryosurgery Procedure Note    Verbal consent from the patient is obtained and the patient is aware of the precancerous quality and need for treatment of these lesions. Liquid nitrogen cryosurgery is applied to the 2 actinic keratoses, as detailed in the physical exam, to produce a freeze injury.      Lentigines  The "ABCD" rules to observe pigmented lesions were reviewed.      History of skin cancer  Comments:  scc right arm removed mohs surgery  Area(s) of previous NMSC evaluated with no signs of recurrence.    Upper body skin examination performed today including at least 6 points as noted in physical examination. No lesions suspicious for malignancy noted.    Recommend daily sun protection/avoidance and use of at least SPF 30, broad spectrum sunscreen (OTC drug).                Follow up in about 6 months (around 9/14/2025).  "

## 2025-03-14 NOTE — PROGRESS NOTES
Subjective:      Patient ID:  Bhavna Landry is a 69 y.o. female who presents for   Chief Complaint   Patient presents with    Follow-up     UBSE     Follow-up - Follow-up  Affected locations: face, left arm, right arm, chest, torso, back and abdomen  Signs / symptoms: asymptomatic    Review of Systems   Constitutional:  Negative for fever, chills, weight loss, weight gain, fatigue, night sweats and malaise.   Skin:  Positive for wears hat. Negative for itching, daily sunscreen use and activity-related sunscreen use.   Hematologic/Lymphatic: Bruises/bleeds easily.     Objective:   Physical Exam   Constitutional: She appears well-developed and well-nourished.   Neurological: She is alert and oriented to person, place, and time.   Psychiatric: She has a normal mood and affect.      Diagram Legend     Erythematous scaling macule/papule c/w actinic keratosis       Vascular papule c/w angioma      Pigmented verrucoid papule/plaque c/w seborrheic keratosis      Yellow umbilicated papule c/w sebaceous hyperplasia      Irregularly shaped tan macule c/w lentigo     1-2 mm smooth white papules consistent with Milia      Movable subcutaneous cyst with punctum c/w epidermal inclusion cyst      Subcutaneous movable cyst c/w pilar cyst      Firm pink to brown papule c/w dermatofibroma      Pedunculated fleshy papule(s) c/w skin tag(s)      Evenly pigmented macule c/w junctional nevus     Mildly variegated pigmented, slightly irregular-bordered macule c/w mildly atypical nevus      Flesh colored to evenly pigmented papule c/w intradermal nevus       Pink pearly papule/plaque c/w basal cell carcinoma      Erythematous hyperkeratotic cursted plaque c/w SCC      Surgical scar with no sign of skin cancer recurrence      Open and closed comedones      Inflammatory papules and pustules      Verrucoid papule consistent consistent with wart     Erythematous eczematous patches and plaques     Dystrophic onycholytic nail with subungual  debris c/w onychomycosis     Umbilicated papule    Erythematous-base heme-crusted tan verrucoid plaque consistent with inflamed seborrheic keratosis     Erythematous Silvery Scaling Plaque c/w Psoriasis     See annotation      Assessment / Plan:        There are no diagnoses linked to this encounter.         No follow-ups on file.

## 2025-03-15 LAB — BACTERIA SPEC AEROBE CULT: NO GROWTH

## 2025-03-18 ENCOUNTER — TELEPHONE (OUTPATIENT)
Dept: PAIN MEDICINE | Facility: CLINIC | Age: 70
End: 2025-03-18
Payer: MEDICARE

## 2025-03-18 DIAGNOSIS — I65.21 CAROTID ARTERY STENOSIS, ASYMPTOMATIC, RIGHT: ICD-10-CM

## 2025-03-18 RX ORDER — PREGABALIN 25 MG/1
CAPSULE ORAL
Qty: 90 CAPSULE | Refills: 5 | Status: SHIPPED | OUTPATIENT
Start: 2025-03-18

## 2025-03-18 NOTE — TELEPHONE ENCOUNTER
Spoke with pt in regards to her message in the portal. Pt stated that she just wants to put in her record that she is take 50mg of Lyrica in the morning which is 2 am  and night 4 pm and 25mg mid da at 12 pm. No further concerns was expressed. Call ended. ----- Message from Patty sent at 3/18/2025  9:42 AM CDT -----  Type:  Needs Medical AdviceWho Called: Pt Symptoms (please be specific): needs to speak to provider regarding pregabalin (LYRICA) 25 MG capsuleWould the patient rather a call back or a response via [facility]? Call back Best Call Back Number: [phone]

## 2025-03-19 ENCOUNTER — TELEPHONE (OUTPATIENT)
Dept: INFUSION THERAPY | Facility: HOSPITAL | Age: 70
End: 2025-03-19
Payer: MEDICARE

## 2025-03-19 RX ORDER — HEPARIN 100 UNIT/ML
500 SYRINGE INTRAVENOUS
Status: CANCELLED | OUTPATIENT
Start: 2025-04-20

## 2025-03-19 RX ORDER — SODIUM CHLORIDE 0.9 % (FLUSH) 0.9 %
10 SYRINGE (ML) INJECTION
Status: CANCELLED | OUTPATIENT
Start: 2025-04-20

## 2025-03-19 RX ORDER — SODIUM CHLORIDE 0.9 % (FLUSH) 0.9 %
10 SYRINGE (ML) INJECTION
OUTPATIENT
Start: 2025-05-20

## 2025-03-19 RX ORDER — ZOLEDRONIC ACID 0.04 MG/ML
4 INJECTION, SOLUTION INTRAVENOUS
OUTPATIENT
Start: 2025-05-20

## 2025-03-19 RX ORDER — ZOLEDRONIC ACID 0.04 MG/ML
4 INJECTION, SOLUTION INTRAVENOUS
Status: CANCELLED | OUTPATIENT
Start: 2025-04-20

## 2025-03-19 RX ORDER — HEPARIN 100 UNIT/ML
500 SYRINGE INTRAVENOUS
OUTPATIENT
Start: 2025-05-20

## 2025-03-20 ENCOUNTER — TELEPHONE (OUTPATIENT)
Dept: VASCULAR SURGERY | Facility: CLINIC | Age: 70
End: 2025-03-20
Payer: MEDICARE

## 2025-03-20 DIAGNOSIS — L40.50 PSORIATIC ARTHRITIS: ICD-10-CM

## 2025-03-20 RX ORDER — METHOTREXATE 2.5 MG/1
20 TABLET ORAL
Qty: 96 TABLET | Refills: 0 | Status: SHIPPED | OUTPATIENT
Start: 2025-03-20

## 2025-03-20 NOTE — TELEPHONE ENCOUNTER
Contacted pt to reschedule appts with vascular lab and with Dr. Johnson to later date due to change in surgeon's schedule. Appointment scheduled, pt verified. Appointment letter placed in mail.

## 2025-03-23 ENCOUNTER — PATIENT MESSAGE (OUTPATIENT)
Dept: PAIN MEDICINE | Facility: CLINIC | Age: 70
End: 2025-03-23
Payer: MEDICARE

## 2025-03-24 ENCOUNTER — PATIENT MESSAGE (OUTPATIENT)
Dept: PSYCHIATRY | Facility: CLINIC | Age: 70
End: 2025-03-24
Payer: MEDICARE

## 2025-03-24 ENCOUNTER — TELEPHONE (OUTPATIENT)
Dept: PAIN MEDICINE | Facility: CLINIC | Age: 70
End: 2025-03-24
Payer: MEDICARE

## 2025-03-24 ENCOUNTER — PATIENT MESSAGE (OUTPATIENT)
Dept: OTOLARYNGOLOGY | Facility: CLINIC | Age: 70
End: 2025-03-24
Payer: MEDICARE

## 2025-03-24 DIAGNOSIS — I65.21 CAROTID ARTERY STENOSIS, ASYMPTOMATIC, RIGHT: ICD-10-CM

## 2025-03-24 RX ORDER — PREGABALIN 25 MG/1
50 CAPSULE ORAL 3 TIMES DAILY
Qty: 180 CAPSULE | Refills: 5 | Status: SHIPPED | OUTPATIENT
Start: 2025-03-24

## 2025-03-27 ENCOUNTER — OFFICE VISIT (OUTPATIENT)
Dept: DERMATOLOGY | Facility: CLINIC | Age: 70
End: 2025-03-27
Payer: MEDICARE

## 2025-03-27 ENCOUNTER — OFFICE VISIT (OUTPATIENT)
Dept: PAIN MEDICINE | Facility: CLINIC | Age: 70
End: 2025-03-27
Payer: MEDICARE

## 2025-03-27 ENCOUNTER — LAB VISIT (OUTPATIENT)
Dept: LAB | Facility: HOSPITAL | Age: 70
End: 2025-03-27
Attending: INTERNAL MEDICINE
Payer: MEDICARE

## 2025-03-27 VITALS — HEART RATE: 78 BPM | SYSTOLIC BLOOD PRESSURE: 116 MMHG | DIASTOLIC BLOOD PRESSURE: 66 MMHG

## 2025-03-27 DIAGNOSIS — R23.4 SCAB: ICD-10-CM

## 2025-03-27 DIAGNOSIS — G89.4 CHRONIC PAIN SYNDROME: ICD-10-CM

## 2025-03-27 DIAGNOSIS — M54.17 LUMBOSACRAL RADICULOPATHY: Primary | ICD-10-CM

## 2025-03-27 DIAGNOSIS — L40.50 PSA (PSORIATIC ARTHRITIS): ICD-10-CM

## 2025-03-27 DIAGNOSIS — M51.362 DEGENERATION OF INTERVERTEBRAL DISC OF LUMBAR REGION WITH DISCOGENIC BACK PAIN AND LOWER EXTREMITY PAIN: ICD-10-CM

## 2025-03-27 DIAGNOSIS — E53.8 B12 DEFICIENCY: ICD-10-CM

## 2025-03-27 DIAGNOSIS — R23.4 FLAKING OF SCALP: ICD-10-CM

## 2025-03-27 DIAGNOSIS — E55.9 VITAMIN D DEFICIENCY: ICD-10-CM

## 2025-03-27 DIAGNOSIS — L65.9 ALOPECIA: ICD-10-CM

## 2025-03-27 DIAGNOSIS — L65.9 ALOPECIA: Primary | ICD-10-CM

## 2025-03-27 DIAGNOSIS — M47.816 LUMBAR SPONDYLOSIS: ICD-10-CM

## 2025-03-27 PROCEDURE — 1101F PT FALLS ASSESS-DOCD LE1/YR: CPT | Mod: CPTII,S$GLB,, | Performed by: PHYSICIAN ASSISTANT

## 2025-03-27 PROCEDURE — 84630 ASSAY OF ZINC: CPT

## 2025-03-27 PROCEDURE — 80053 COMPREHEN METABOLIC PANEL: CPT

## 2025-03-27 PROCEDURE — 36415 COLL VENOUS BLD VENIPUNCTURE: CPT | Mod: PO

## 2025-03-27 PROCEDURE — 86140 C-REACTIVE PROTEIN: CPT

## 2025-03-27 PROCEDURE — 99214 OFFICE O/P EST MOD 30 MIN: CPT | Mod: S$GLB,,, | Performed by: PHYSICIAN ASSISTANT

## 2025-03-27 PROCEDURE — 99999 PR PBB SHADOW E&M-EST. PATIENT-LVL III: CPT | Mod: PBBFAC,,, | Performed by: STUDENT IN AN ORGANIZED HEALTH CARE EDUCATION/TRAINING PROGRAM

## 2025-03-27 PROCEDURE — 82607 VITAMIN B-12: CPT

## 2025-03-27 PROCEDURE — 3288F FALL RISK ASSESSMENT DOCD: CPT | Mod: CPTII,S$GLB,, | Performed by: PHYSICIAN ASSISTANT

## 2025-03-27 PROCEDURE — 4010F ACE/ARB THERAPY RXD/TAKEN: CPT | Mod: CPTII,S$GLB,, | Performed by: PHYSICIAN ASSISTANT

## 2025-03-27 PROCEDURE — G2211 COMPLEX E/M VISIT ADD ON: HCPCS | Mod: S$GLB,,, | Performed by: PHYSICIAN ASSISTANT

## 2025-03-27 PROCEDURE — 99999 PR PBB SHADOW E&M-EST. PATIENT-LVL V: CPT | Mod: PBBFAC,,, | Performed by: PHYSICIAN ASSISTANT

## 2025-03-27 PROCEDURE — 82652 VIT D 1 25-DIHYDROXY: CPT

## 2025-03-27 PROCEDURE — 85025 COMPLETE CBC W/AUTO DIFF WBC: CPT

## 2025-03-27 PROCEDURE — 1160F RVW MEDS BY RX/DR IN RCRD: CPT | Mod: CPTII,S$GLB,, | Performed by: PHYSICIAN ASSISTANT

## 2025-03-27 PROCEDURE — 85652 RBC SED RATE AUTOMATED: CPT

## 2025-03-27 PROCEDURE — 1159F MED LIST DOCD IN RCRD: CPT | Mod: CPTII,S$GLB,, | Performed by: PHYSICIAN ASSISTANT

## 2025-03-27 RX ORDER — KETOCONAZOLE 20 MG/ML
SHAMPOO, SUSPENSION TOPICAL
Qty: 120 ML | Refills: 5 | Status: SHIPPED | OUTPATIENT
Start: 2025-03-27

## 2025-03-27 NOTE — PROGRESS NOTES
[facility] -  Established Patient Evaluation      Chief Complaint  Chief Complaint   Patient presents with    Back Pain    Follow-up           3/27/2025     2:22 PM 10/11/2024     2:21 PM 8/30/2024    11:04 AM   Last 3 PDI Scores   Pain Disability Index (PDI) 35 18 35     Interval History 3/27/2025:     Bhavna Landry is a 69 y.o. female presents to the clinic for follow up of her low back pain.  She states that the Lyrica has provided her with significant relief.  She also notes that the recent caudal epidural provided on 01/29/2025 also provided her with significant improvement if her pain.  Today she requests to schedule an epidural preemptively.    The pain is located in the back, leg and buttocks area and does not radiate.  The pain is described as boring, sharp, shooting, stabbing, throbbing, and tight band    At BEST  4/10   At WORST  7/10 on the WORST day.    On average pain is rated as 4/10.   Today the pain is rated as 8/10  Symptoms interfere with daily activity.   Exacerbating factors: Walking, Night Time, and Morning.    Mitigating factors medications and epidural injection    Interval History 2/3/2025:  69-year-old female presents virtually for a follow-up appointment she is status post a caudal SOPHY on 01/29/2025 she reports 90% relief of her left leg and left low back pain however she abruptly is experiencing low back and right hip and right anterior thigh pain that started this morning.  Denies any recent incident or trauma denies any profound weakness she does report paresthesia like symptoms in the right hip and right anterior thigh.  She denies any bowel bladder dysfunction denies saddle anesthesia at this time.      Interval Update 10/11/2024: Patient return to clinic SP Caudal SOPHY procedure done on 09/18/2024 with 100% relief with improved functionality.    Interval update 08/30/24:  Patient return to clinic for follow up on back pain.  She reports most recent right  L3/L4 and L4/L5 right-sided transforaminal epidural steroid injection on 07/17/2024 did provide her with pain relief.  Her pain is gradually returning, but she still notes her pain is better than prior to injection.  Pain radiates to the right buttocks and right leg.  She reports pain over the sacrum as well.  She tried the Lyrica 50 mg prescribed by NSGY, but stated the medication sedated her.  She was interested in trying the Lyrica again at a lower dose.  Today she rates her pain 7/10.    Interval Update 06/21/2024: Patient return to clinic for follow up on back pain.  She has status post a lumbar SOPHY targeting L5-S1 on 05/22/2024 reporting 30% relief of her symptoms her daughter who is present with her did report that she was able to walk around her home without using her walker following this injection.  Despite the percentage of relief being low.  Continues to suffer from chronic right low back and right leg pain her pain starts in the low right low back radiating into her right buttocks wrapping around to the front of her right leg into her toes.  She denies any recent incident or trauma denies any falls denies any profound weakness.  She was also previously provided a right-sided sacral lateral branch RFA and was scheduled to have the left side RFA done however her right sciatic pain has taking over her SI joint/hip pain.  I will focus my energy on trying to alleviate her right-sided sciatica type pain.  She denies taking gabapentin anymore as she felt like the medicine was causing her ankles swell.  Today she denies any profound weakness denies any bowel bladder dysfunction denies saddle anesthesia at this time.    Interval History 05/02/2024  Bhavna Landry presents today virtually for follow up her pain back.  She recently underwent right sided sacral lateral branch RFA and was scheduled to have left sided RFA performed, but her sciatica pain has flared up in the interim.  She would like to hold off on  the left sacral lateral branch RFA as her right-sided radicular symptoms are more severe.  Pain radiates down the right lower extremity to the top of the foot.  She needs to use a walker to ambulate due to pain.  She has a history of lumbar radiculopathy and underwent L5/S1 interlaminar SOPHY in 08/2023 with excellent results.  She reports her current radicular symptoms are the same as prior.  She would like to repeat the lumbar epidural steroid injection.  She is currently taking gabapentin which is prescribed by her psychiatrist.  She denies any weakness in the lower extremities, saddle anesthesia, or loss of bowel or bladder function.  She currently rates her pain 8/10.       Interval History 04/02/2024:  Bhavna Landry presents today for follow up of her low back pain.  Her low back/sacral pain has returned.  In the past she has responded well to bilateral L5 Dorsal Ramus and Lateral Branches of S1, S2, and S3 RFA.  This is previously given her significant relief lasting several years.  She would like to repeat this procedure.  Denies any recent falls or trauma.  No changes to her pain.  Denies any radicular symptoms.  No weakness, saddle anesthesia, bowel or bladder changes.     Interval History (05/22/2023):  Bhavna Landry returns today for follow up she is s/p a a diagnostic Lumbar MBB targeting L4/5 and L5/S1 that  provided 0% relief of her low back and leg pain R>L. She would like to consider other injection that may help. She denies and new pain, denies B/B dysfunction, denies any profound weakness.     Current Pain Scales:  Current: 5/10              Typical Range: 5-9/10     Interval History (03/14/2023):  Bhavna Landry returns today for low back pain that radiates into the b/l thighs.  Pain is described as clenching pain that has worsened over last 2 years. No trauma or surgery.  Pain is worse with extension.  Pain is better with heat, lying down, gabapentin, tylenol and tramadol.  She is scheduled to  "start PT next week.  Currently, the bilateral hip and bilateral leg pain is stable.  Avoid NSAIDs due to history of gastric ulcer.     Current Pain Scales:  Current: 7/10              Typical Range: 7-8/10     Background from Chart Review  9/16/2020- Mrs. Landry presents to clinic today reporting left hip pain for the past 3-4 mos, which has not improved with PT.  Pain starts in the lateral left hip "on the bone" and radiates through the buttock, down the lateral thigh not passing the knee, and into the anterolateral thigh.  She endorses regular stretching and states that this pain is distinctly different from the SI pain for which she received SI RFA in the past.     6/09/2020-   64-year-old female presents status post left then right  DR 5+ lateral branches of the S1, S2 and S3 RFA with reported 100% continued  relief she reports that her pain score today is 0/10.  She reports improvement with her ADLs and overall improvement of her functionality.     05/05/2020  presents tele-medicine appointment for a follow-up appointment for low back, left groin and bilateral hip pain.  Since the last visit, Bhavna Landry states the pain has been persistant. Current pain intensity is 9/10.  Patient reports onset of pain 2 weeks, patient states bending forward and sitting for long periods of time increase her pain.  Pain is described as aching.  She reports no radicular symptoms in either leg.  Worst pain is not out of 10.  Patient is status post right and left L5 Dorsal Ramus and Lateral Branches of S1, S2, and S3 (4 levels) RFA reported 80-90% relief for a minimal 6 months.  Pain is now returned and patient like to reschedule procedure.        Treatment History  PT/OT: yes  HEP: yes  TENS:    Injections:  -01/29/2025 Caudal Epidural Steroid Injection 90% relief of left leg and left LBP.  - 09/18/2024Caudal Epidural Steroid Injection 100%  - 07/17/2024-right L3/L4 and L4/L5 transforaminal epidural steroid " injection  -05/22/2024 Lumbar Interlaminar Epidural Steroid Injection L5/S1 30%  Bilateral SI joint injections, bilateral  Dorsal ramus block(DR5, S1,S2,S3),  bilateral L5 Dorsal Ramus and Lateral Branches of S1, S2, and S3 (4 levels) RFA, GTB injections   12/08/2023 - Cervical Interlaminar Epidural Steroid Injection C6/C7 under Fluoroscopic Guidance   08//02/2023 -  Lumbar Interlaminar Epidural Steroid Injection L5/S1   05/03/2023 -  Diagnostic Bilateral L3, L4, and L5 Lumbar Medial Branch Block under Fluoroscopy  - No relief    Surgery:  Medications:   - NSAIDS: avoid due to gastric ulcer   - MSK Relaxants:   - TCAs:   - SNRIs: Venlafaxine  - Topicals: Voltaren  - Opioids: Tramadol   - Anticonvulsants: Gabapentin    Current Pain Medications  Gabapentin  Tramadol      Full Medication List    Current Outpatient Medications:     acetaminophen (TYLENOL) 500 MG tablet, Take 1 tablet (500 mg total) by mouth every 6 (six) hours as needed., Disp: , Rfl:     albuterol (PROVENTIL) 2.5 mg /3 mL (0.083 %) nebulizer solution, Take 3 mLs (2.5 mg total) by nebulization every 6 (six) hours as needed for Wheezing. Rescue, Disp: 90 each, Rfl: 3    albuterol (PROVENTIL/VENTOLIN HFA) 90 mcg/actuation inhaler, USE 2 INHALATIONS BY MOUTH 4  TIMES DAILY AS NEEDED, Disp: 25.5 g, Rfl: 3    amLODIPine (NORVASC) 5 MG tablet, Take 1 tablet (5 mg total) by mouth 2 (two) times a day., Disp: 180 tablet, Rfl: 3    ascorbic acid, vitamin C, (VITAMIN C) 500 MG tablet, Take 500 mg by mouth once daily., Disp: , Rfl:     aspirin (ECOTRIN) 81 MG EC tablet, Take 81 mg by mouth once daily., Disp: , Rfl:     atorvastatin (LIPITOR) 80 MG tablet, Take 1 tablet (80 mg total) by mouth once daily., Disp: 90 tablet, Rfl: 3    azelastine (ASTELIN) 137 mcg (0.1 %) nasal spray, 1 spray (137 mcg total) by Nasal route 2 (two) times daily., Disp: 30 mL, Rfl: 0    azelastine (ASTELIN) 137 mcg (0.1 %) nasal spray, 1 spray (137 mcg total) by Nasal route 2 (two) times  daily., Disp: 30 mL, Rfl: 0    baclofen (LIORESAL) 10 MG tablet, 1 tab po tid for muscle cramps., Disp: 60 tablet, Rfl: 1    benztropine (COGENTIN) 0.5 MG tablet, TAKE 1 TABLET(0.5 MG) BY MOUTH TWICE DAILY, Disp: 180 tablet, Rfl: 3    exemestane (AROMASIN) 25 mg tablet, Take 1 tablet (25 mg total) by mouth once daily., Disp: 30 tablet, Rfl: 11    fluticasone propionate (FLONASE) 50 mcg/actuation nasal spray, SHAKE LIQUID AND USE 1 SPRAY(50 MCG) IN EACH NOSTRIL DAILY, Disp: 32 g, Rfl: 2    folic acid (FOLVITE) 1 MG tablet, Take 1 tablet (1,000 mcg total) by mouth once daily., Disp: 90 tablet, Rfl: 3    HYDROcodone-acetaminophen (NORCO) 5-325 mg per tablet, Take 1 tablet by mouth every 8 (eight) hours as needed for Pain., Disp: 90 tablet, Rfl: 0    immun glob G,IgG,-pro-IgA 0-50 (HIZENTRA) 4 gram/20 mL (20 %) Soln, Inject 60 mLs (12 g total) into the skin every 14 (fourteen) days., Disp: 120 mL, Rfl: 11    isosorbide mononitrate (IMDUR) 30 MG 24 hr tablet, Take 1 tablet (30 mg total) by mouth once daily., Disp: 90 tablet, Rfl: 3    ketoconazole (NIZORAL) 2 % shampoo, Wash hair with medicated shampoo at least 2x/month - let sit on scalp at least 5 minutes prior to rinsing, Disp: 120 mL, Rfl: 5    LIDOcaine (LIDODERM) 5 %, Place 1 patch onto the skin once daily. Remove & Discard patch within 12 hours or as directed by MD, Disp: 30 patch, Rfl: 0    meloxicam (MOBIC) 7.5 MG tablet, Take 1 tablet (7.5 mg total) by mouth once daily., Disp: 14 tablet, Rfl: 0    meloxicam (MOBIC) 7.5 MG tablet, Take 1 tablet (7.5 mg total) by mouth once daily., Disp: 10 tablet, Rfl: 0    methotrexate 2.5 MG Tab, Take 8 tablets (20 mg total) by mouth every 7 days. TAKE 4 TABLETS MONDAY MORNING AND TAKE 4 TABLETS MONDAY NIGHT ONLY, Disp: 96 tablet, Rfl: 0    omega-3 fatty acids/fish oil (FISH OIL-OMEGA-3 FATTY ACIDS) 300-1,000 mg capsule, Take by mouth once daily., Disp: , Rfl:     omeprazole (PRILOSEC) 40 MG capsule, TAKE 1 CAPSULE(40 MG) BY  MOUTH EVERY MORNING, Disp: 90 capsule, Rfl: 3    oxyCODONE (ROXICODONE) 5 MG immediate release tablet, Take 1 tablet (5 mg total) by mouth every 12 (twelve) hours as needed., Disp: 10 tablet, Rfl: 0    pregabalin (LYRICA) 25 MG capsule, Take 2 capsules (50 mg total) by mouth 3 (three) times daily. Take 2 capsules (50 mg) AM and PM and 1-2 capsules midday (25-50 mg)., Disp: 180 capsule, Rfl: 5    risperiDONE (RISPERDAL) 2 MG tablet, TAKE 1 TABLET(2 MG) BY MOUTH EVERY MORNING, Disp: 90 tablet, Rfl: 3    risperiDONE (RISPERDAL) 4 MG tablet, Take 1 tablet (4 mg total) by mouth every evening., Disp: 90 tablet, Rfl: 3    traMADoL (ULTRAM) 50 mg tablet, Take 1 tablet (50 mg total) by mouth every 8 (eight) hours as needed for Pain., Disp: 90 each, Rfl: 0    traMADoL (ULTRAM) 50 mg tablet, Take 1 tablet (50 mg total) by mouth every 8 (eight) hours as needed for Pain., Disp: 30 tablet, Rfl: 0    valsartan (DIOVAN) 160 MG tablet, Take 160 mg by mouth once daily. Patient can also take 80mg in the evening, Disp: , Rfl:     venlafaxine (EFFEXOR-XR) 37.5 MG 24 hr capsule, Take 1 capsule (37.5 mg total) by mouth once daily. Take with 75mg capsule to equal 112.5mg daily., Disp: 90 capsule, Rfl: 3    venlafaxine (EFFEXOR-XR) 75 MG 24 hr capsule, Take 1 capsule (75 mg total) by mouth once daily. Take with 37.5mg capsule to equal 112.5mg daily., Disp: 90 capsule, Rfl: 3    vitamin D (VITAMIN D3) 1000 units Tab, Take 1,000 Units by mouth once daily., Disp: , Rfl:     vitamin E 200 UNIT capsule, Take 200 Units by mouth once daily., Disp: , Rfl:     zinc gluconate 50 mg tablet, Take 50 mg by mouth once daily., Disp: , Rfl:     cefixime (SUPRAX) 400 mg Cap, One daily, Disp: 10 capsule, Rfl: 0    clindamycin (CLEOCIN) 150 MG capsule, Take 1 capsule (150 mg total) by mouth 3 (three) times daily., Disp: 21 capsule, Rfl: 0  No current facility-administered medications for this visit.    Facility-Administered Medications Ordered in Other  Visits:     tropicamide 1% ophthalmic solution 1 drop, 1 drop, Right Eye, On Call Procedure, Karen Song MD, 1 drop at 08/01/19 0648     Review of Systems  ROS  REVIEW OF SYSTEMS:    GENERAL:  No weight loss, malaise or fevers.  HEENT:   No recent changes in vision or hearing  NECK:  No difficulty with swallowing. No stridor.   RESPIRATORY:  Negative for cough, wheezing or shortness of breath, patient denies any recent URI.  CARDIOVASCULAR:  Negative for chest pain, leg swelling or palpitations. +HTN  GI:  Negative for abdominal discomfort, blood in stools or black stools or change in bowel habits.  MUSCULOSKELETAL:  See HPI.  SKIN:  Negative for lesions, rash, and itching.  PSYCH:  No mood disorder or recent psychosocial stressors.   +anxiety +depression   HEMATOLOGY/LYMPHOLOGY:  Negative for prolonged bleeding, bruising easily or swollen nodes.  Patient is not currently taking any anti-coagulants  NEURO:   No history of headaches, syncope, paralysis, seizures or tremors.  All other reviewed and negative other than HPI.      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, estrogen receptor positive 04/13/2022    Cataract     Depression     Dry eyes     Dry mouth     Duane's syndrome of right eye     Early dry stage nonexudative age-related macular degeneration of both eyes 09/20/2022    Fibromyalgia 04/17/2014    Fibromyalgia     Fractured hip     RIGHT HIP    GERD (gastroesophageal reflux disease)     History of psychiatric hospitalization     Hyperlipidemia 02/02/1992    Hypertension     Hypocalcemia     Hyponatremia     Kidney stone     Migraine headache     Osteoporosis     Pressure ulcer of unspecified site, unspecified stage     Psoriatic arthritis 02/02/1992    Recurrent upper respiratory infection (URI)     Right knee pain     post knee  replacement surgery (possible rejectiion of metal)    RLS (restless legs syndrome)     Schizophrenia 1992    stable on meds    Sciatica     Squamous cell carcinoma of skin     Urinary tract infection         Surgical History  Past Surgical History:   Procedure Laterality Date    brow ptosis repair Right 2019    Surgeon: Dr. Karla Henson    CAROTID ENDARTERECTOMY Right 2025    Procedure: ENDARTERECTOMY-CAROTID;  Surgeon: SLIME Johnson III, MD;  Location: [facility] OR 2ND FLR;  Service: Vascular;  Laterality: Right;    CATARACT EXTRACTION      CAUDAL EPIDURAL STEROID INJECTION N/A 2024    Procedure: Caudal SOPHY;  Surgeon: Maile Storm DO;  Location: [facility];  Service: Pain Management;  Laterality: N/A;  ASA ok    CAUDAL EPIDURAL STEROID INJECTION N/A 2025    Procedure: Caudal SOPHY;  Surgeon: Maile Storm DO;  Location: [facility];  Service: Pain Management;  Laterality: N/A;  asa ok     SECTION      COLONOSCOPY N/A 2016    Procedure: COLONOSCOPY;  Surgeon: ANDRIA Connelly MD;  Location: [facility] (4TH FLR);  Service: Endoscopy;  Laterality: N/A;    COLONOSCOPY N/A 2022    Procedure: COLONOSCOPY;  Surgeon: Mikey Walters MD;  Location: [facility];  Service: Endoscopy;  Laterality: N/A;    cyst removed from right sinus  1982    EPIDURAL STEROID INJECTION INTO CERVICAL SPINE N/A 2023    Procedure: C6-7 SOPHY;  Surgeon: Spring Jensen MD;  Location: [facility];  Service: Pain Management;  Laterality: N/A;  20 mins    EPIDURAL STEROID INJECTION INTO LUMBAR SPINE N/A 2023    Procedure: Injection-steroid-epidural-lumbar L5-S1;  Surgeon: Chirag Kim MD;  Location: [facility];  Service: Pain Management;  Laterality: N/A;  asa    EPIDURAL STEROID INJECTION INTO LUMBAR SPINE N/A 2024    Procedure: L5/S1 Interlaminar SOPHY;  Surgeon: Maile Storm DO;  Location: [facility];  Service: Pain  Management;  Laterality: N/A;  20mins-ASA 5d    ESOPHAGOGASTRODUODENOSCOPY N/A 02/07/2023    Procedure: EGD (ESOPHAGOGASTRODUODENOSCOPY);  Surgeon: Dougie Shea MD;  Location: [facility] ENDO;  Service: Endoscopy;  Laterality: N/A;    FOOT FRACTURE SURGERY      HYSTERECTOMY      VAGINAL HYSTERECTOMY WITHOUT BSO - ENDOMETRIOSIS    INJECTION FOR SENTINEL NODE IDENTIFICATION Right 05/09/2022    Procedure: INJECTION, FOR SENTINEL NODE IDENTIFICATION-Right;  Surgeon: NEAL Dhillon MD;  Location: [facility] OR;  Service: General;  Laterality: Right;    INJECTION OF ANESTHETIC AGENT AROUND MEDIAL BRANCH NERVES INNERVATING LUMBAR FACET JOINT N/A 04/11/2019    Procedure: Lumbo-sacral Block, DR5 and Lateral Branches of S1,S2, S3;  Surgeon: Michelle Pineda Jr., MD;  Location: [facility] PAIN MGT;  Service: Pain Management;  Laterality: N/A;  Pt takes  and states she holds ASA on her own whenever she has procedures.  Instructed to hold x 3 days prior to procedure.      INJECTION OF ANESTHETIC AGENT AROUND MEDIAL BRANCH NERVES INNERVATING LUMBAR FACET JOINT Bilateral 05/03/2023    Procedure: Block-nerve-medial branch-lumbar bilateral L3, L4, L5;  Surgeon: Maile Storm DO;  Location: [facility] PAIN MGT;  Service: Pain Management;  Laterality: Bilateral;  asa    INJECTION OF ANESTHETIC AGENT INTO SACROILIAC JOINT Right 08/30/2018    Procedure: BLOCK, SACROILIAC JOINT-Right- ORAL SEDATION;  Surgeon: Michelle Pineda Jr., MD;  Location: [facility] PAIN MGT;  Service: Pain Management;  Laterality: Right;    INJECTION OF ANESTHETIC AGENT INTO SACROILIAC JOINT Bilateral 09/27/2018    Procedure: BLOCK, SACROILIAC JOINT-BILATERAL;  Surgeon: Michelle Pineda Jr., MD;  Location: [facility] PAIN MGT;  Service: Pain Management;  Laterality: Bilateral;  Please keep at 10:00 due to trasnsportation    INJECTION OF ANESTHETIC AGENT INTO SACROILIAC JOINT Bilateral 02/07/2019    Procedure: Bilateral Sacroiliac Joint Injection - Per Dr Pineda, not necessary  to hold ASA.;  Surgeon: Michelle Pineda Jr., MD;  Location: [facility] PAIN MGT;  Service: Pain Management;  Laterality: Bilateral;    INJECTION, SPINE, LUMBOSACRAL, TRANSFORAMINAL APPROACH Right 7/17/2024    Procedure: Right  L3/4  and L4/5 TFESI;  Surgeon: Maile Storm DO;  Location: [facility];  Service: Pain Management;  Laterality: Right;  20 mins  ASA 5 days    INTRAOCULAR PROSTHESES INSERTION Right 08/01/2019    Procedure: INSERTION, IOL PROSTHESIS;  Surgeon: Karen Song MD;  [address]  Service: Ophthalmology;  Laterality: Right;    INTRAOCULAR PROSTHESES INSERTION Left 09/26/2019    Procedure: INSERTION, IOL PROSTHESIS;  Surgeon: Karen Song MD;  Location: [address];  Service: Ophthalmology;  Laterality: Left;    JOINT REPLACEMENT Right     knee    KNEE SURGERY      MASTECTOMY Right 05/09/2022    Procedure: MASTECTOMY-Right;  Surgeon: NEAL Dhillon MD;  Location: [facility];  Service: General;  Laterality: Right;  2.5 HOURS    PHACOEMULSIFICATION OF CATARACT Right 08/01/2019    Procedure: PHACOEMULSIFICATION, CATARACT;  Surgeon: Karen Song MD;  Location: [address];  Service: Ophthalmology;  Laterality: Right;    PHACOEMULSIFICATION OF CATARACT Left 09/26/2019    Procedure: PHACOEMULSIFICATION, CATARACT;  Surgeon: Karen Song MD;  Location: [facility] OR [address] [place] [address];  Service: Ophthalmology;  Laterality: Left;    RADIOFREQUENCY ABLATION, NERVE, PERIPHERAL Right 04/24/2024    Procedure: RIGHT L5 Dorsal Ramus and RIGHT Lateral Branches of S1, S2, and S3;  Surgeon: Maile Storm DO;  Location: [facility];  Service: Pain Management;  Laterality: Right;  30 mins    RADIOFREQUENCY THERMOCOAGULATION Right 05/07/2019    Procedure: RADIOFREQUENCY THERMAL COAGULATION RIGHT DORSAL RAMUS 5 AND LATERAL BRANCH OF S1, S2 AND S3;  Surgeon: Michelle Pineda Jr., MD;  Location: [facility] PAIN MGT;  Service: Pain Management;  Laterality: Right;     RADIOFREQUENCY THERMOCOAGULATION Left 05/14/2019    Procedure: RADIOFREQUENCY THERMAL COAGULATION LEFT DORSAL RAMUS 5 AND LATERAL BRANCH OF S1,S2 AND S3;  Surgeon: Michelle Pineda Jr., MD;  Location: [facility];  Service: Pain Management;  Laterality: Left;    RADIOFREQUENCY THERMOCOAGULATION Right 10/22/2019    Procedure: RADIOFREQUENCY THERMAL COAGULATION---DARSAL RAMUS 5 and LATERAL S1,S2,and S3 Right;  Surgeon: Michelle Pineda Jr., MD;  Location: [facility];  Service: Pain Management;  Laterality: Right;  patient to sign consent DOS    RADIOFREQUENCY THERMOCOAGULATION Left 10/29/2019    Procedure: RADIOFREQUENCY THERMAL COAGULATION - LEFT - DR5, S1,S2, AND S3;  Surgeon: Michelle Pineda Jr., MD;  Location: [facility];  Service: Pain Management;  Laterality: Left;    RADIOFREQUENCY THERMOCOAGULATION Left 05/26/2020    Procedure: RADIOFREQUENCY THERMAL COAGULATION--Left DR5+ lateral branches of S1, S2, S3;  Surgeon: Michelle Pineda Jr., MD;  Location: [facility];  Service: Pain Management;  Laterality: Left;    RADIOFREQUENCY THERMOCOAGULATION Right 06/02/2020    Procedure: RADIOFREQUENCY THERMAL COAGULATION--Right DR5+ lateral branches of S1, S2, S3;  Surgeon: Michelle Pineda Jr., MD;  Location: [facility];  Service: Pain Management;  Laterality: Right;    SENTINEL LYMPH NODE BIOPSY Right 05/09/2022    Procedure: BIOPSY, LYMPH NODE, SENTINEL-Right;  Surgeon: NEAL Dhillon MD;  Location: [facility];  Service: General;  Laterality: Right;    SINUS SURGERY      Surgery on right knee  07/09/1982    TONSILLECTOMY      tumor removed from back left side upper shoulder  03/17/2006        Physical Exam  Vitals:    03/27/25 1421   BP: 116/66   Pulse: 78   PainSc:   8   PainLoc: Back       GEN: No acute distress. Calm, comfortable  HENT: Normocephalic, atraumatic, moist mucous membranes  EYE: Anicteric sclera, non-injected.   CV: Non-diaphoretic.   RESP: Breathing comfortably. Chest expansion  symmetric.  PSYCH: Pleasant mood and appropriate affect. Recent and remote memory intact.       Ortho/SPM Exam  PHYSICAL EXAMINATION Prior:    GENERAL: Well appearing, in no acute distress, alert and oriented x3.  PSYCH:  Mood and affect appropriate.  SKIN: Skin color, texture, turgor normal, no rashes or lesions.  HEAD/FACE:  Normocephalic, atraumatic. Cranial nerves grossly intact.  NECK: Normal ROM. Supple.   CV: RRR with palpation of the radial artery.  PULM: No evidence of respiratory difficulty, symmetric chest rise.  GI:  Soft and non-distended.  MSK: Straight leg raising is negative to radicular pain. There is pain to palpation over the facet joints of the lumbar spine. There is pain with lumbar facet loading.  Normal range of motion without pain reproduction with lumbar flexion. Pain with extension.  Peripheral joint ROM is full and pain free without obvious instability or laxity in all four extremities. No deformities, edema, or skin discoloration.  No atrophy or tone abnormalities are noted.   NEURO: Bilateral upper and lower extremity coordination and strength is symmetric.  No loss of sensation is noted.  MENTAL STATUS: A x O x 3, good concentration, speech is fluent and goal directed  MOTOR: 5/5 in all muscle groups  GAIT:  Antalgic..  Ambulates unassisted.    Imaging  MRI LUMBAR SPINE WITHOUT CONTRAST     CLINICAL HISTORY:  Low back pain, symptoms persist with > 6wks conservative treatment; Dorsalgia, unspecified     TECHNIQUE:  Multiplanar, multisequence MR images were acquired from the thoracolumbar junction to the sacrum without the administration of contrast.     COMPARISON:  CT lumbar spine dated 05/31/2024 and MRI lumbar spine dated 03/11/2023     FINDINGS:  Lumbar spine vertebral body heights appear maintained with thoracolumbar levocurvature.  Variable multilevel disc space narrowing and Modic type 1 endplate changes.  The spinal cord terminus lies near L2-L3.  Probable thin fatty filum  terminale, similar. Remaining visualized prevertebral and paraspinal soft tissues demonstrate no acute abnormality.     T12-L1: Circumferential bulge without significant spinal canal stenosis or neural foraminal impingement.     L1-L2: Circumferential bulge with facet arthropathy.  Mild flavum buckling and partial flattening of the ventral thecal sac.  Right neural foraminal encroachment.     L2-L3: Circumferential bulge and facet arthropathy.  No significant spinal canal stenosis.  Mild neural foraminal narrowing.     L3-L4: Circumferential bulge with large superimposed left paracentral extrusion with approximate 0.8 cm inferior migration.  Effacement of the left paracentral thecal sac and left lateral recess and severe left neural foraminal narrowing.     L4-L5: Circumferential bulge with superimposed left paracentral extrusion with superior migration of approximately 1.0 cm.  Flavum thickening and mild facet DJD contributing to mild spinal canal stenosis.  Mild right and moderate left neural foraminal narrowing.     L5-S1: Circumferential bulge and facet DJD with flavum thickening.  Superimposed right subarticular extrusion with inferior migration demonstrating slight variable T2 signal to the parent disc.  Findings contribute to lateral recess narrowing bilaterally with mild-moderate spinal canal stenosis.  Additional abutment and probable encroachment of the right transiting S1 nerve root.  Moderate-severe left and severe right neural foraminal narrowing.     Impression:     Thoracolumbar scoliosis with multilevel lumbar spondylosis.  Detailed findings on a level by level basis as above.        Electronically signed by:Henok Damon  Date:                                            07/16/2024    Labs:  BMP  Lab Results   Component Value Date     (L) 03/07/2025    K 4.9 03/07/2025     03/07/2025    CO2 26 03/07/2025    BUN 11 03/07/2025    CREATININE 0.8 03/07/2025    CALCIUM 9.5 03/07/2025     ANIONGAP 7 (L) 03/07/2025    EGFRNORACEVR >60.0 03/07/2025     Lab Results   Component Value Date    ALT 5 (L) 03/07/2025    AST 37 03/07/2025    GGT 91 (H) 07/28/2014    ALKPHOS 59 03/07/2025    BILITOT 0.2 03/07/2025       Assessment:  Problem List Items Addressed This Visit    None          03/14/2023 -Bhavna Landry is a 69 y.o. 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, estrogen receptor positive (04/13/2022), Cataract, Depression, Dry eyes, Dry mouth, Duane's syndrome of right eye, Early dry stage nonexudative age-related macular degeneration of both eyes (09/20/2022), Fibromyalgia (04/17/2014), Fibromyalgia, Fractured hip, GERD (gastroesophageal reflux disease), History of psychiatric hospitalization, Hyperlipidemia (02/02/1992), Hypertension, Hypocalcemia, Hyponatremia, Kidney stone, Migraine headache, Osteoporosis, Pressure ulcer of unspecified site, unspecified stage, Psoriatic arthritis (02/02/1992), Recurrent upper respiratory infection (URI), Right knee pain, RLS (restless legs syndrome), Schizophrenia (02/02/1992), Sciatica, Squamous cell carcinoma of skin, and Urinary tract infection.  By history and examination this patient has chronic low back pain without radiculopathy.  The underlying cause cause is facet arthritis and deconditioning.  Pathology is confirmed by imaging.  We discussed the underlying diagnoses and multiple treatment options including non-opioid medications, interventional procedures, physical therapy, and home exercise.  The risks and benefits of each treatment option were discussed and all questions were answered.      5/22/2023- 66 y/o female with a Hx of chronic low back  and bilateral leg pain she is s/p a diagnosis lumbar MBB targeting L4/5 and L5/S1 reporting 0% relief of her symptoms. Her Lumbar MRI multilevel disc bulges starting   at  L3 through S1.  She has severe right, moderate left neural foraminal narrowing at L5-S1 thta may be causing her Low back and leg pain.  Her pain was refractory to diagnositic low lumbar MBB so i will attempt a L4-5 Lumbar SOPHY.     04/02/2024 Bhavna Landry presents for follow up of her low back/SI joint pain.  Patient has previously undergone bilateral L5 Dorsal Ramus and Lateral Branches of S1, S2, and S3 RFA with significant and lasting relief.  Prior RFA lasted about 2 years.  She would like to repeat the procedure as the pain has now returned.    05/02/2024 - Patient presents virtually today for follow up of her low back pain.  Today her biggest complaint is radicular symptoms primarily on the right.  Patient has a history of lumbar radiculopathy and previously underwent an L5/S1 epidural steroid injection in 08/2023 with excellent relief.  Her pain has returned and she would like to repeat the epidural steroid injection.  She would like to hold off on the left-sided sacral RFA for now until we can address her radicular symptoms as they are more severe.  Discussed with the patient that she may benefit from a surgical consult if epidural does not provide persistent relief.    6/21/2024- 69 y/o female presents for continued pain in the right low back right buttocks and right leg that radiates into her right foot. Her recent L5-S1 IESI provided 30% relief of her symptoms.  Her MRI significant for disc bulges throughout the lower lumbar spine L3 through S1 she has severe right moderate left neural foraminal narrowing at L5-S1 that may be causing right low back and right leg pain.  Today we discussed focusing her pain interventions on the right side in effort to reduce some of the right-sided symptoms as she does not complain about pain in the left lumbar or left leg.    08/30/2024 - patient presents for follow up of her low back pain with right-sided radicular symptoms.  Most recent right transforaminal epidural  steroid injection at L3/L4 and L4/L5 did provide her with some relief..  She continues to have pain that radiates into the buttocks and down the leg as well as pain over the sacrum.  We discussed perform a caudal epidural steroid injection.  Patient was amenable to this plan.    [birth year]-teuw-old female with a history of chronic low back with right radiculopathy.  Recently provided a caudal SOPHY on 09/18/2024 that provided 100% relief of her symptoms with improved functionality.  Patient has completed physical therapy in the past recommended that she establish a home exercise plan utilizing the exercises she learned in physical therapy focusing on stretching and muscular strengthening to help with lumbar stabilization.    2/3/2025-Ms Landry presented virtually with a subacute pain described as aching throbbing and sharp in her right low back and right hip radiating into her right anterior thigh onset was this morning.  Etiology of her pain is unclear at this time she was previously taking Lyrica 50 mg  q.h.s. she reports that she is not taking this medication in some time her and I discussed I will optimize her medication and start her on 25 mg q.h.s. gradually increasing this medication to t.i.d. she verbalized understanding of how I want her to take this medication, she denied any previous side effects with it.     03/27/2025-patient presents today for follow up of her low back pain.  She has responded well to Lyrica as well as caudal epidural steroid injection.  The patient was requesting to preemptively schedule another caudal epidural.  I educated the patient that a prophylactic epidural will not prevent her pain.  Recommend that she increase her Lyrica to 50 mg t.i.d. if tolerated.  Should her pain return, we can certainly consider repeating caudal epidural, , but explained that pretreating with an epidural was not an option.    Plan & Recommendations  Procedures:  Consider repeating caudal in the future  if needed.  Medications:  Increase Lyrica 50 mg t.i.d. if tolerated.  Took currently taking 50 mg twice daily and 25 mg at her noon time dose.  Imaging:  Reviewed  PT/OT/HEP: I encouraged the patient to maintain a home exercise regimen that includes daily, moderate cardiovascular exercise lasting at least 30 minutes.  This may include yoga, gela chi, walking, swimming, aqua aerobics, or other exercises that maintain a heart rate of 50-70% of the calculated maximum heart rate.  I also encouraged light, daily stretching focused on the target area.  Follow Up:  She will reach out when her pain returns.    Maile Storm,    Interventional Pain Management        Disclaimer: This note was partly generated using dictation software which may occasionally result in transcription errors.

## 2025-03-27 NOTE — PATIENT INSTRUCTIONS
Androgenetic Alopecia in Females (Female Pattern Hair Loss)  What is it?  A nonscarring hair loss that primarily involves the frontal scalp and vertex of the scalp. Untreated, FPHL results in a slow, progressive decline in the density of scalp hair, but does not typically progress to baldness.  What causes it?  Androgens, also known as male sex hormones, play a critical role. Dihydrotestosterone is the key androgen involved in the induction and promotion of MPHL.   Testosterone -> converted by 5-alpha reductase -> Dihydrotestosterone   Genetic component   Treatment options:  Topical Minoxidil (5% foam once daily application)- must allow 2 hours to dry before bed- over the counter  Foam should be applied to the scalp and not the hair.   Shedding may occur during the early course of treatment. This usually resolves in 2 months.  Must continue for 6 months  If treatment is stopped, regrowth will be lost often over the course of several months.   Skin Medicinals Compound solution  Shampoo  Ketoconazole 2% shampoo- Rx  Has antiandrogenic properties at the follicle   Zenegen shampoo- OTC  Supplements/oils   Vivascal or Nutrafol- talk to PCP before starting  Rosemary oil 3x per week  Pumpkin seed oil 400 mg daily- talk to PCP before starting   Things to Know  The course of the disease is variable, but tends to progress slowly over the course of many years.   Our goal is preserve the hair that is still present, prevent any further hair loss, and to incite regrowth.   Hair growth is a slow process. Stick with the treatment regimen! Stopping the treatment regimen too soon may not allow us to accurately assess what is working and what's not.       Viviscal Professional Hair Growth Supplement  Cost: $43 for 60 tablets  Ingredients    Vitamin C (from Acerola Powder and as Ascorbic Acid), Biotin, AminoMar Marine Complex, Shark Powder, Mollusc Powder, Apple Extract Powder, Procyanidin B-2, L-Cystine, L-Methionine, Microcrystalline  Cellulose, Maltodextrin, Hydroxypropyl Methyl Cellulose, Magnesium Stearate, Silicon Dioxide, Artificial Orange Flavoring, Modified Starch, Glycerol.    Allergy advice: Contains fish and shellfish, not recommended for those allergic to fish, shellfish, or seafood.    Direction for use  Recommended daily intake: 2 tablets  Use daily for a minimum of 3-6 months  Take 1 tablet in the morning and 1 tablet in the evening (with water, after eating)  Afterwards take 1-2 tablets daily as required to maintain healthy hair growth      Nutrafol Hair Growth Supplement  Cost: $68-88 for 120 capsules    Ingredients  Hydrolyzed fish collagen, ashwagandha, hyaluronic acid, bioperine, biotin, saw palmetto, amino acid blend    Allergy advice: Claims to be free from shellfish and wheat    Be cautious when using in patients on anti-platelet medications (Saw Palmetto is in ingredients)    Direction for use  Recommended daily intake: 4 capsules daily with a meal (2 per day is sufficient for most)  Use daily for a minimum of 3-6 months  Afterwards take 1-2 tablets daily as required to maintain healthy hair growth      Scab  Flaking of scalp  -No worrisome features today      Follow up in about 6 months (around 9/27/2025).

## 2025-03-27 NOTE — PROGRESS NOTES
Subjective:      Patient ID:  Bhavna Landry is a 69 y.o. female who presents for   Chief Complaint   Patient presents with    Hair Loss    Lesion     Reports that the hair loss started at the same time as the sores on the scalp. Sores on scalp for 2 years (scabs). Pt reports picking at them when they itch. Reports itching once a week or so. Worried they could potentially be skin cancer. Recently had her skin check with Dr. Vega a few weeks ago.    Hx of SCC.    Denies family hx of hair thinning.      Tx in the past with anti itch shampoo with no improvement.     Review of Systems   Skin:  Positive for itching.       Objective:   Physical Exam   Constitutional: She appears well-developed and well-nourished. No distress.   Neurological: She is alert and oriented to person, place, and time. She is not disoriented.   Psychiatric: She has a normal mood and affect.   Skin:   Areas Examined (abnormalities noted in diagram):   Scalp / Hair Palpated and Inspected                          Diagram Legend     Erythematous scaling macule/papule c/w actinic keratosis       Vascular papule c/w angioma      Pigmented verrucoid papule/plaque c/w seborrheic keratosis      Yellow umbilicated papule c/w sebaceous hyperplasia      Irregularly shaped tan macule c/w lentigo     1-2 mm smooth white papules consistent with Milia      Movable subcutaneous cyst with punctum c/w epidermal inclusion cyst      Subcutaneous movable cyst c/w pilar cyst      Firm pink to brown papule c/w dermatofibroma      Pedunculated fleshy papule(s) c/w skin tag(s)      Evenly pigmented macule c/w junctional nevus     Mildly variegated pigmented, slightly irregular-bordered macule c/w mildly atypical nevus      Flesh colored to evenly pigmented papule c/w intradermal nevus       Pink pearly papule/plaque c/w basal cell carcinoma      Erythematous hyperkeratotic cursted plaque c/w SCC      Surgical scar with no sign of skin cancer recurrence      Open and  closed comedones      Inflammatory papules and pustules      Verrucoid papule consistent consistent with wart     Erythematous eczematous patches and plaques     Dystrophic onycholytic nail with subungual debris c/w onychomycosis     Umbilicated papule    Erythematous-base heme-crusted tan verrucoid plaque consistent with inflamed seborrheic keratosis     Erythematous Silvery Scaling Plaque c/w Psoriasis     See annotation      Assessment / Plan:        Alopecia- most likely androgenetic   -     ketoconazole (NIZORAL) 2 % shampoo; Wash hair with medicated shampoo at least 2x/month - let sit on scalp at least 5 minutes prior to rinsing  Dispense: 120 mL; Refill: 5  -     Vitamin B12; Future; Expected date: 03/27/2025  -     Calcitriol; Future; Expected date: 03/27/2025  -     Zinc; Future; Expected date: 03/27/2025    Vitamin D deficiency- rule out  -     Calcitriol; Future; Expected date: 03/27/2025    B12 deficiency- rule out  -     Vitamin B12; Future; Expected date: 03/27/2025        What is it?  A nonscarring hair loss that primarily involves the frontal scalp and vertex of the scalp. Untreated, FPHL results in a slow, progressive decline in the density of scalp hair, but does not typically progress to baldness.  What causes it?  Androgens, also known as male sex hormones, play a critical role. Dihydrotestosterone is the key androgen involved in the induction and promotion of MPHL.   Testosterone -> converted by 5-alpha reductase -> Dihydrotestosterone   Genetic component   Treatment options:  Topical Minoxidil (5% foam once daily application)- must allow 2 hours to dry before bed- over the counter  Foam should be applied to the scalp and not the hair.   Shedding may occur during the early course of treatment. This usually resolves in 2 months.  Must continue for 6 months  If treatment is stopped, regrowth will be lost often over the course of several months.   Skin Medicinals Compound  solution  Shampoo  Ketoconazole 2% shampoo- Rx  Has antiandrogenic properties at the [facility] shampoo- OTC  Supplements/oils   Vivascal or Nutrafol- talk to PCP before starting  Rosemary oil 3x per week  Pumpkin seed oil 400 mg daily- talk to PCP before starting      Recommended starting topical minoxidil and keto shampoo.     Scab  Flaking of scalp  -No worrisome features today      Follow up in about 6 months (around 9/27/2025).

## 2025-03-28 ENCOUNTER — OFFICE VISIT (OUTPATIENT)
Dept: ALLERGY | Facility: CLINIC | Age: 70
End: 2025-03-28
Payer: MEDICARE

## 2025-03-28 VITALS
SYSTOLIC BLOOD PRESSURE: 129 MMHG | OXYGEN SATURATION: 95 % | HEIGHT: 61 IN | DIASTOLIC BLOOD PRESSURE: 71 MMHG | WEIGHT: 102.5 LBS | HEART RATE: 66 BPM | BODY MASS INDEX: 19.35 KG/M2

## 2025-03-28 DIAGNOSIS — J32.9 RECURRENT SINUSITIS: ICD-10-CM

## 2025-03-28 DIAGNOSIS — R06.02 SHORTNESS OF BREATH: ICD-10-CM

## 2025-03-28 DIAGNOSIS — D83.9 CVID (COMMON VARIABLE IMMUNODEFICIENCY): Primary | ICD-10-CM

## 2025-03-28 DIAGNOSIS — J31.0 RHINITIS, UNSPECIFIED TYPE: ICD-10-CM

## 2025-03-28 LAB
ABSOLUTE EOSINOPHIL (OHS): 0.19 K/UL
ABSOLUTE MONOCYTE (OHS): 0.76 K/UL (ref 0.3–1)
ABSOLUTE NEUTROPHIL COUNT (OHS): 7.3 K/UL (ref 1.8–7.7)
ALBUMIN SERPL BCP-MCNC: 3.4 G/DL (ref 3.5–5.2)
ALP SERPL-CCNC: 76 UNIT/L (ref 40–150)
ALT SERPL W/O P-5'-P-CCNC: 10 UNIT/L (ref 10–44)
ANION GAP (OHS): 8 MMOL/L (ref 8–16)
AST SERPL-CCNC: 21 UNIT/L (ref 11–45)
BASOPHILS # BLD AUTO: 0.06 K/UL
BASOPHILS NFR BLD AUTO: 0.6 %
BILIRUB SERPL-MCNC: 0.3 MG/DL (ref 0.1–1)
BUN SERPL-MCNC: 7 MG/DL (ref 8–23)
CALCIUM SERPL-MCNC: 9.2 MG/DL (ref 8.7–10.5)
CHLORIDE SERPL-SCNC: 103 MMOL/L (ref 95–110)
CO2 SERPL-SCNC: 26 MMOL/L (ref 23–29)
CREAT SERPL-MCNC: 0.8 MG/DL (ref 0.5–1.4)
CRP SERPL-MCNC: 5.8 MG/L
ERYTHROCYTE [DISTWIDTH] IN BLOOD BY AUTOMATED COUNT: 15.3 % (ref 11.5–14.5)
ERYTHROCYTE [SEDIMENTATION RATE] IN BLOOD BY PHOTOMETRIC METHOD: 3 MM/HR
GFR SERPLBLD CREATININE-BSD FMLA CKD-EPI: >60 ML/MIN/1.73/M2
GLUCOSE SERPL-MCNC: 120 MG/DL (ref 70–110)
HCT VFR BLD AUTO: 36.8 % (ref 37–48.5)
HGB BLD-MCNC: 11.4 GM/DL (ref 12–16)
IMM GRANULOCYTES # BLD AUTO: 0.03 K/UL (ref 0–0.04)
IMM GRANULOCYTES NFR BLD AUTO: 0.3 % (ref 0–0.5)
LYMPHOCYTES # BLD AUTO: 1.51 K/UL (ref 1–4.8)
MCH RBC QN AUTO: 31 PG (ref 27–50)
MCHC RBC AUTO-ENTMCNC: 31 G/DL (ref 32–36)
MCV RBC AUTO: 100 FL (ref 82–98)
NUCLEATED RBC (/100WBC) (OHS): 0 /100 WBC
PLATELET # BLD AUTO: 324 K/UL (ref 150–450)
PMV BLD AUTO: 8.8 FL (ref 9.2–12.9)
POTASSIUM SERPL-SCNC: 4.2 MMOL/L (ref 3.5–5.1)
PROT SERPL-MCNC: 6.1 GM/DL (ref 6–8.4)
RBC # BLD AUTO: 3.68 M/UL (ref 4–5.4)
RELATIVE EOSINOPHIL (OHS): 1.9 %
RELATIVE LYMPHOCYTE (OHS): 15.3 % (ref 18–48)
RELATIVE MONOCYTE (OHS): 7.7 % (ref 4–15)
RELATIVE NEUTROPHIL (OHS): 74.2 % (ref 38–73)
SODIUM SERPL-SCNC: 137 MMOL/L (ref 136–145)
VIT B12 SERPL-MCNC: 1251 PG/ML (ref 210–950)
WBC # BLD AUTO: 9.85 K/UL (ref 3.9–12.7)

## 2025-03-28 PROCEDURE — 99999 PR PBB SHADOW E&M-EST. PATIENT-LVL V: CPT | Mod: PBBFAC,,, | Performed by: STUDENT IN AN ORGANIZED HEALTH CARE EDUCATION/TRAINING PROGRAM

## 2025-03-28 PROCEDURE — G2211 COMPLEX E/M VISIT ADD ON: HCPCS | Mod: S$GLB,,, | Performed by: STUDENT IN AN ORGANIZED HEALTH CARE EDUCATION/TRAINING PROGRAM

## 2025-03-28 PROCEDURE — 3078F DIAST BP <80 MM HG: CPT | Mod: CPTII,S$GLB,, | Performed by: STUDENT IN AN ORGANIZED HEALTH CARE EDUCATION/TRAINING PROGRAM

## 2025-03-28 PROCEDURE — 1125F AMNT PAIN NOTED PAIN PRSNT: CPT | Mod: CPTII,S$GLB,, | Performed by: STUDENT IN AN ORGANIZED HEALTH CARE EDUCATION/TRAINING PROGRAM

## 2025-03-28 PROCEDURE — 3074F SYST BP LT 130 MM HG: CPT | Mod: CPTII,S$GLB,, | Performed by: STUDENT IN AN ORGANIZED HEALTH CARE EDUCATION/TRAINING PROGRAM

## 2025-03-28 PROCEDURE — 4010F ACE/ARB THERAPY RXD/TAKEN: CPT | Mod: CPTII,S$GLB,, | Performed by: STUDENT IN AN ORGANIZED HEALTH CARE EDUCATION/TRAINING PROGRAM

## 2025-03-28 PROCEDURE — 1160F RVW MEDS BY RX/DR IN RCRD: CPT | Mod: CPTII,S$GLB,, | Performed by: STUDENT IN AN ORGANIZED HEALTH CARE EDUCATION/TRAINING PROGRAM

## 2025-03-28 PROCEDURE — 3008F BODY MASS INDEX DOCD: CPT | Mod: CPTII,S$GLB,, | Performed by: STUDENT IN AN ORGANIZED HEALTH CARE EDUCATION/TRAINING PROGRAM

## 2025-03-28 PROCEDURE — 1159F MED LIST DOCD IN RCRD: CPT | Mod: CPTII,S$GLB,, | Performed by: STUDENT IN AN ORGANIZED HEALTH CARE EDUCATION/TRAINING PROGRAM

## 2025-03-28 PROCEDURE — 99215 OFFICE O/P EST HI 40 MIN: CPT | Mod: S$GLB,,, | Performed by: STUDENT IN AN ORGANIZED HEALTH CARE EDUCATION/TRAINING PROGRAM

## 2025-03-28 RX ORDER — BUDESONIDE AND FORMOTEROL FUMARATE DIHYDRATE 80; 4.5 UG/1; UG/1
2 AEROSOL RESPIRATORY (INHALATION) 2 TIMES DAILY
Qty: 10.2 G | Refills: 11 | Status: SHIPPED | OUTPATIENT
Start: 2025-03-28 | End: 2026-03-28

## 2025-03-28 NOTE — PROGRESS NOTES
ALLERGY & IMMUNOLOGY CLINIC - FOLLOW UP     HISTORY OF PRESENT ILLNESS     Patient ID: Bhavna Landry is a 69 y.o. female    CC: CVID, recurrent sinusitis     HPI: Bhavna Landry is a 69 y.o. female with a history of breast cancer (diagnosed 2022), HTN, psoriatic arthritis, OA, osteoporosis, rhinitis, recurrent sinusitis, wheezing and shortness of breath, following up for CVID and recurrent sinusitis.     Patient is here with her daughter. History is mostly from the patient.    She would like to start the hizentra. They say that they think they will be able to make it work financially.   She says she has been getting a lot of sinus infections. She gets better with antibiotics, but then she gets another sinus infection 1-2 weeks later.  No pneumonia since our last visit.   She is using flonase nightly.  She says the wheezing is doing better. Not happening as much at night anymore.  She is not on the symbicort. She thinks she threw her symbicort inhaler away.   She is using albuterol 2-3 times per day. For wheezinfg. She says she doesn't get shortness of breath bc she doesn't exert herself.   She is still smoking.      MEDICAL HISTORY     Vaccines:    Immunization History   Administered Date(s) Administered    COVID-19 MRNA, LN-S PF (MODERNA HALF 0.25 ML DOSE) 04/01/2022    COVID-19, MRNA, LN-S, PF (MODERNA FULL 0.5 ML DOSE) 02/19/2021, 03/19/2021, 09/24/2021    COVID-19, mRNA, LNP-S, PF (Moderna) Ages 12+ 10/17/2023    COVID-19, mRNA, LNP-S, PF, shira-sucrose, 30 mcg/0.3 mL (Pfizer Ages 12+) 10/09/2024    Influenza 01/14/2014, 09/16/2016    Influenza (FLUAD) - Quadrivalent - Adjuvanted - PF *Preferred* (65+) 11/17/2021    Influenza - Quadrivalent 10/20/2015    Influenza - Quadrivalent - High Dose - PF (65 years and older) 10/03/2020, 09/23/2022, 09/14/2023    Influenza - Quadrivalent - PF *Preferred* (6 months and older) 12/11/2014, 10/30/2017    Influenza - Trivalent - Afluria, Fluzone MDV 01/14/2014    Influenza -  Trivalent - Fluad - Adjuvanted - PF (65 years and older 10/09/2024    Influenza - Trivalent - Fluarix, Flulaval, Fluzone, Afluria - PF 09/16/2016    Influenza - Trivalent - Fluzone High Dose - PF (65 years and older) 10/06/2018, 10/24/2019    Influenza Split 01/14/2014    Pneumococcal Conjugate - 13 Valent 08/15/2018    Pneumococcal Conjugate - 20 Valent 03/06/2024    Pneumococcal Polysaccharide - 23 Valent 01/14/2014, 11/26/2019, 10/03/2020, 05/24/2024    RSVpreF (Arexvy) 09/14/2023    Tdap 04/18/2007, 07/17/2017    Zoster 12/28/2015, 07/25/2016    Zoster Recombinant 10/12/2020, 01/02/2021     Medical Hx:   Patient Active Problem List   Diagnosis    PSA (psoriatic arthritis)    Low back pain    Essential hypertension    Hyperlipidemia    Paranoid schizophrenia    Insomnia    Extrapyramidal syndrome    Migraine without aura and without status migrainosus, not intractable    Duane's syndrome of right eye    Posterior vitreous detachment, both eyes    Retinal hemorrhage, left    Retinal tear    Fibromyalgia    History of nickel allergy    Internal derangement of right knee    S/P R knee surgery, TKA revision    Pain of right tibia    Brow ptosis    Sacroiliac joint dysfunction    Chronic pain    Myofascial pain syndrome    Restless legs    Osteoporosis, post-menopausal    Senile nuclear sclerosis    Venous incompetence    Bilateral sacroiliitis    PAF (paroxysmal atrial fibrillation)    Bilateral carotid artery disease    Chronic anticoagulation    Left groin pain    Left hip pain    Vestibular disequilibrium, left    Greater trochanteric bursitis of left hip    Depression    Rheumatoid arthritis    Mild cognitive impairment with memory loss    Atherosclerosis of aorta    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, estrogen receptor positive    Osteopenia of multiple sites    Use of anastrozole    Early dry stage nonexudative age-related macular degeneration of both eyes    Aromatase  inhibitor-associated arthralgia    Drug-induced immunodeficiency    Iron deficiency anemia due to chronic blood loss    Decreased functional mobility    Decreased range of motion of lumbar spine    Secondary and unspecified malignant neoplasm of axilla and upper limb lymph nodes    Leg edema, left    Gastroesophageal reflux disease    Injury of left foot    RASHIDA (generalized anxiety disorder)    Cervical spondylosis    Lumbar spondylosis    Cough    Impaired functional mobility, balance, gait, and endurance    Generalized weakness    Common variable immunodeficiency    Cigarette nicotine dependence without complication     Surgical Hx:   Past Surgical History:   Procedure Laterality Date    brow ptosis repair Right 2019    Surgeon: Dr. Karla Henson    CAROTID ENDARTERECTOMY Right 2025    Procedure: ENDARTERECTOMY-CAROTID;  Surgeon: SLIME Johnson III, MD;  [facility];  Service: Vascular;  Laterality: Right;    CATARACT EXTRACTION      CAUDAL EPIDURAL STEROID INJECTION N/A 2024    Procedure: Caudal SOPHY;  Surgeon: Maile Storm DO;  Location: [facility] PAIN MANAGEMENT;  Service: Pain Management;  Laterality: N/A;  ASA ok    CAUDAL EPIDURAL STEROID INJECTION N/A 2025    Procedure: Caudal SOPHY;  Surgeon: Maile Storm DO;  Location: [facility] PAIN MANAGEMENT;  Service: Pain Management;  Laterality: N/A;  asa ok     SECTION      COLONOSCOPY N/A 2016    Procedure: COLONOSCOPY;  Surgeon: ANDRIA Connelly MD;  Location: [facility] (4TH FLR);  Service: Endoscopy;  Laterality: N/A;    COLONOSCOPY N/A 2022    Procedure: COLONOSCOPY;  Surgeon: Mikey Walters MD;  Location: [facility] ENDO;  Service: Endoscopy;  Laterality: N/A;    cyst removed from right sinus  1982    EPIDURAL STEROID INJECTION INTO CERVICAL SPINE N/A 2023    Procedure: C6-7 SOPHY;  Surgeon: Spring Jensen MD;  Location: [facility] PAIN MANAGEMENT;  Service: Pain Management;  Laterality: N/A;  20 mins     EPIDURAL STEROID INJECTION INTO LUMBAR SPINE N/A 08/02/2023    Procedure: Injection-steroid-epidural-lumbar L5-S1;  Surgeon: Chirag Kim MD;  Location: [facility] PAIN MANAGEMENT;  Service: Pain Management;  Laterality: N/A;  asa    EPIDURAL STEROID INJECTION INTO LUMBAR SPINE N/A 5/22/2024    Procedure: L5/S1 Interlaminar SOPHY;  Surgeon: Maile Storm DO;  Location: [facility] PAIN MANAGEMENT;  Service: Pain Management;  Laterality: N/A;  20mins-ASA 5d    ESOPHAGOGASTRODUODENOSCOPY N/A 02/07/2023    Procedure: EGD (ESOPHAGOGASTRODUODENOSCOPY);  Surgeon: Dougie Shea MD;  Location: [facility];  Service: Endoscopy;  Laterality: N/A;    FOOT FRACTURE SURGERY      HYSTERECTOMY      VAGINAL HYSTERECTOMY WITHOUT BSO - ENDOMETRIOSIS    INJECTION FOR SENTINEL NODE IDENTIFICATION Right 05/09/2022    Procedure: INJECTION, FOR SENTINEL NODE IDENTIFICATION-Right;  Surgeon: NEAL Dhillon MD;  Location: [facility];  Service: General;  Laterality: Right;    INJECTION OF ANESTHETIC AGENT AROUND MEDIAL BRANCH NERVES INNERVATING LUMBAR FACET JOINT N/A 04/11/2019    Procedure: Lumbo-sacral Block, DR5 and Lateral Branches of S1,S2, S3;  Surgeon: Michelle Pineda Jr., MD;  Location: [facility] PAIN MGT;  Service: Pain Management;  Laterality: N/A;  Pt takes  and states she holds ASA on her own whenever she has procedures.  Instructed to hold x 3 days prior to procedure.      INJECTION OF ANESTHETIC AGENT AROUND MEDIAL BRANCH NERVES INNERVATING LUMBAR FACET JOINT Bilateral 05/03/2023    Procedure: Block-nerve-medial branch-lumbar bilateral L3, L4, L5;  Surgeon: Maile Storm DO;  Location: [facility] PAIN MGT;  Service: Pain Management;  Laterality: Bilateral;  asa    INJECTION OF ANESTHETIC AGENT INTO SACROILIAC JOINT Right 08/30/2018    Procedure: BLOCK, SACROILIAC JOINT-Right- ORAL SEDATION;  Surgeon: Michelle Pineda Jr., MD;  Location: [facility] PAIN MGT;  Service: Pain Management;  Laterality: Right;    INJECTION OF ANESTHETIC AGENT  INTO SACROILIAC JOINT Bilateral 09/27/2018    Procedure: BLOCK, SACROILIAC JOINT-BILATERAL;  Surgeon: Michelle Pineda Jr., MD;  Location: [facility] PAIN MGT;  Service: Pain Management;  Laterality: Bilateral;  Please keep at 10:00 due to trasnsportation    INJECTION OF ANESTHETIC AGENT INTO SACROILIAC JOINT Bilateral 02/07/2019    Procedure: Bilateral Sacroiliac Joint Injection - Per Dr Pineda, not necessary to hold ASA.;  Surgeon: Michelle Pineda Jr., MD;  Location: [facility] PAIN MGT;  Service: Pain Management;  Laterality: Bilateral;    INJECTION, SPINE, LUMBOSACRAL, TRANSFORAMINAL APPROACH Right 7/17/2024    Procedure: Right  L3/4  and L4/5 TFESI;  Surgeon: Maile Storm DO;  Location: [facility] PAIN MANAGEMENT;  Service: Pain Management;  Laterality: Right;  20 mins  ASA 5 days    INTRAOCULAR PROSTHESES INSERTION Right 08/01/2019    Procedure: INSERTION, IOL PROSTHESIS;  Surgeon: Karen Song MD;  Location: [address];  Service: Ophthalmology;  Laterality: Right;    INTRAOCULAR PROSTHESES INSERTION Left 09/26/2019    Procedure: INSERTION, IOL PROSTHESIS;  Surgeon: Karen Song MD;  Location: [facility] OR [address] [place] [address];  Service: Ophthalmology;  Laterality: Left;    JOINT REPLACEMENT Right     knee    KNEE SURGERY      MASTECTOMY Right 05/09/2022    Procedure: MASTECTOMY-Right;  Surgeon: NEAL Dhillon MD;  Location: [facility];  Service: General;  Laterality: Right;  2.5 HOURS    PHACOEMULSIFICATION OF CATARACT Right 08/01/2019    Procedure: PHACOEMULSIFICATION, CATARACT;  Surgeon: Karen Song MD;  Location: [address];  Service: Ophthalmology;  Laterality: Right;    PHACOEMULSIFICATION OF CATARACT Left 09/26/2019    Procedure: PHACOEMULSIFICATION, CATARACT;  Surgeon: Karen Song MD;  Location: [facility] OR [address] [place] [address];  Service: Ophthalmology;  Laterality: Left;    RADIOFREQUENCY ABLATION, NERVE, PERIPHERAL Right 04/24/2024    Procedure: RIGHT L5 Dorsal Ramus and RIGHT Lateral Branches of S1, S2, and  S3;  Surgeon: Maile Storm DO;  Location: [facility] PAIN MANAGEMENT;  Service: Pain Management;  Laterality: Right;  30 mins    RADIOFREQUENCY THERMOCOAGULATION Right 05/07/2019    Procedure: RADIOFREQUENCY THERMAL COAGULATION RIGHT DORSAL RAMUS 5 AND LATERAL BRANCH OF S1, S2 AND S3;  Surgeon: Michelle Pineda Jr., MD;  Location: [facility];  Service: Pain Management;  Laterality: Right;    RADIOFREQUENCY THERMOCOAGULATION Left 05/14/2019    Procedure: RADIOFREQUENCY THERMAL COAGULATION LEFT DORSAL RAMUS 5 AND LATERAL BRANCH OF S1,S2 AND S3;  Surgeon: Michelle Pineda Jr., MD;  Location: [facility];  Service: Pain Management;  Laterality: Left;    RADIOFREQUENCY THERMOCOAGULATION Right 10/22/2019    Procedure: RADIOFREQUENCY THERMAL COAGULATION---DARSAL RAMUS 5 and LATERAL S1,S2,and S3 Right;  Surgeon: Michelle Pineda Jr., MD;  Location: [facility];  Service: Pain Management;  Laterality: Right;  patient to sign consent DOS    RADIOFREQUENCY THERMOCOAGULATION Left 10/29/2019    Procedure: RADIOFREQUENCY THERMAL COAGULATION - LEFT - DR5, S1,S2, AND S3;  Surgeon: Michelle Pineda Jr., MD;  Location: [facility];  Service: Pain Management;  Laterality: Left;    RADIOFREQUENCY THERMOCOAGULATION Left 05/26/2020    Procedure: RADIOFREQUENCY THERMAL COAGULATION--Left DR5+ lateral branches of S1, S2, S3;  Surgeon: Michelle Pineda Jr., MD;  Location: [facility];  Service: Pain Management;  Laterality: Left;    RADIOFREQUENCY THERMOCOAGULATION Right 06/02/2020    Procedure: RADIOFREQUENCY THERMAL COAGULATION--Right DR5+ lateral branches of S1, S2, S3;  Surgeon: Michelle Pineda Jr., MD;  Location: [facility];  Service: Pain Management;  Laterality: Right;    SENTINEL LYMPH NODE BIOPSY Right 05/09/2022    Procedure: BIOPSY, LYMPH NODE, SENTINEL-Right;  Surgeon: NEAL Dhillon MD;  Location: [facility];  Service: General;  Laterality: Right;    SINUS SURGERY      Surgery on right knee  07/09/1982     TONSILLECTOMY      tumor removed from back left side upper shoulder  03/17/2006     Medications:   Current Outpatient Medications on File Prior to Visit   Medication Sig Dispense Refill    acetaminophen (TYLENOL) 500 MG tablet Take 1 tablet (500 mg total) by mouth every 6 (six) hours as needed.      albuterol (PROVENTIL) 2.5 mg /3 mL (0.083 %) nebulizer solution Take 3 mLs (2.5 mg total) by nebulization every 6 (six) hours as needed for Wheezing. Rescue 90 each 3    albuterol (PROVENTIL/VENTOLIN HFA) 90 mcg/actuation inhaler USE 2 INHALATIONS BY MOUTH 4  TIMES DAILY AS NEEDED 25.5 g 3    amLODIPine (NORVASC) 5 MG tablet Take 1 tablet (5 mg total) by mouth 2 (two) times a day. 180 tablet 3    ascorbic acid, vitamin C, (VITAMIN C) 500 MG tablet Take 500 mg by mouth once daily.      aspirin (ECOTRIN) 81 MG EC tablet Take 81 mg by mouth once daily.      atorvastatin (LIPITOR) 80 MG tablet Take 1 tablet (80 mg total) by mouth once daily. 90 tablet 3    azelastine (ASTELIN) 137 mcg (0.1 %) nasal spray 1 spray (137 mcg total) by Nasal route 2 (two) times daily. 30 mL 0    azelastine (ASTELIN) 137 mcg (0.1 %) nasal spray 1 spray (137 mcg total) by Nasal route 2 (two) times daily. 30 mL 0    baclofen (LIORESAL) 10 MG tablet 1 tab po tid for muscle cramps. 60 tablet 1    benztropine (COGENTIN) 0.5 MG tablet TAKE 1 TABLET(0.5 MG) BY MOUTH TWICE DAILY 180 tablet 3    exemestane (AROMASIN) 25 mg tablet Take 1 tablet (25 mg total) by mouth once daily. 30 tablet 11    fluticasone propionate (FLONASE) 50 mcg/actuation nasal spray SHAKE LIQUID AND USE 1 SPRAY(50 MCG) IN EACH NOSTRIL DAILY 32 g 2    folic acid (FOLVITE) 1 MG tablet Take 1 tablet (1,000 mcg total) by mouth once daily. 90 tablet 3    HYDROcodone-acetaminophen (NORCO) 5-325 mg per tablet Take 1 tablet by mouth every 8 (eight) hours as needed for Pain. 90 tablet 0    immun glob G,IgG,-pro-IgA 0-50 (HIZENTRA) 4 gram/20 mL (20 %) Soln Inject 60 mLs (12 g total) into the  skin every 14 (fourteen) days. 120 mL 11    isosorbide mononitrate (IMDUR) 30 MG 24 hr tablet Take 1 tablet (30 mg total) by mouth once daily. 90 tablet 3    ketoconazole (NIZORAL) 2 % shampoo Wash hair with medicated shampoo at least 2x/month - let sit on scalp at least 5 minutes prior to rinsing 120 mL 5    LIDOcaine (LIDODERM) 5 % Place 1 patch onto the skin once daily. Remove & Discard patch within 12 hours or as directed by MD 30 patch 0    meloxicam (MOBIC) 7.5 MG tablet Take 1 tablet (7.5 mg total) by mouth once daily. 14 tablet 0    meloxicam (MOBIC) 7.5 MG tablet Take 1 tablet (7.5 mg total) by mouth once daily. 10 tablet 0    methotrexate 2.5 MG Tab Take 8 tablets (20 mg total) by mouth every 7 days. TAKE 4 TABLETS MONDAY MORNING AND TAKE 4 TABLETS MONDAY NIGHT ONLY 96 tablet 0    omega-3 fatty acids/fish oil (FISH OIL-OMEGA-3 FATTY ACIDS) 300-1,000 mg capsule Take by mouth once daily.      omeprazole (PRILOSEC) 40 MG capsule TAKE 1 CAPSULE(40 MG) BY MOUTH EVERY MORNING 90 capsule 3    oxyCODONE (ROXICODONE) 5 MG immediate release tablet Take 1 tablet (5 mg total) by mouth every 12 (twelve) hours as needed. 10 tablet 0    pregabalin (LYRICA) 25 MG capsule Take 2 capsules (50 mg total) by mouth 3 (three) times daily. Take 2 capsules (50 mg) AM and PM and 1-2 capsules midday (25-50 mg). 180 capsule 5    risperiDONE (RISPERDAL) 2 MG tablet TAKE 1 TABLET(2 MG) BY MOUTH EVERY MORNING 90 tablet 3    risperiDONE (RISPERDAL) 4 MG tablet Take 1 tablet (4 mg total) by mouth every evening. 90 tablet 3    traMADoL (ULTRAM) 50 mg tablet Take 1 tablet (50 mg total) by mouth every 8 (eight) hours as needed for Pain. 90 each 0    traMADoL (ULTRAM) 50 mg tablet Take 1 tablet (50 mg total) by mouth every 8 (eight) hours as needed for Pain. 30 tablet 0    valsartan (DIOVAN) 160 MG tablet Take 160 mg by mouth once daily. Patient can also take 80mg in the evening      venlafaxine (EFFEXOR-XR) 37.5 MG 24 hr capsule Take 1  capsule (37.5 mg total) by mouth once daily. Take with 75mg capsule to equal 112.5mg daily. 90 capsule 3    venlafaxine (EFFEXOR-XR) 75 MG 24 hr capsule Take 1 capsule (75 mg total) by mouth once daily. Take with 37.5mg capsule to equal 112.5mg daily. 90 capsule 3    vitamin D (VITAMIN D3) 1000 units Tab Take 1,000 Units by mouth once daily.      vitamin E 200 UNIT capsule Take 200 Units by mouth once daily.      zinc gluconate 50 mg tablet Take 50 mg by mouth once daily.      cefixime (SUPRAX) 400 mg Cap One daily 10 capsule 0    clindamycin (CLEOCIN) 150 MG capsule Take 1 capsule (150 mg total) by mouth 3 (three) times daily. 21 capsule 0     Current Facility-Administered Medications on File Prior to Visit   Medication Dose Route Frequency Provider Last Rate Last Admin    tropicamide 1% ophthalmic solution 1 drop  1 drop Right Eye On Call Procedure Karen Song MD   1 drop at [phone]     Drug Allergies:   Review of patient's allergies indicates:   Allergen Reactions    Etanercept Other (See Comments)     Other reaction(s): recurrent infections    Chloramphenicol sod succinate Hives    Codeine Other (See Comments)     Other reaction(s): Stomach upset. Pt states OK with Percocet    Nickel sutures [surgical stainless steel] Dermatitis     Allergic contact dermatitis    Adhesive Rash     Social Hx:   Social History     Tobacco Use    Smoking status: Every Day     Current packs/day: 0.00     Average packs/day: 0.3 packs/day for 10.0 years (2.5 ttl pk-yrs)     Types: Cigarettes     Start date: 1/10/2013     Last attempt to quit: 1/10/2023     Years since quittin.2     Passive exposure: Never    Smokeless tobacco: Former    Tobacco comments:     about 10 cigs a day 1/15/25   Substance Use Topics    Alcohol use: No    Drug use: No     Additional History Obtained at Initial Visit:  H/o Asthma: as a child.   H/o Rhinitis: endorses  Env/Occ:   Pets: dog and cat. She thinks the cat might trigger  "symptoms.  Social Hx:   She started smoking in her 30's. She smokes about 5 cigarettes per day.  Family Hx:   Asthma: mother  Immune deficiency: unknown      PHYSICAL EXAM     VS: /71 (BP Location: Left arm, Patient Position: Sitting)   Pulse 66   Ht 5' 1" (1.549 m)   Wt 46.5 kg (102 lb 8.2 oz)   SpO2 95%   BMI 19.37 kg/m²   GENERAL: Alert, NAD, well-appearing  EYES: EOMI, no conjunctival injection, no discharge, no infraorbital shiners  NOSE: NT 2+ B/L, no stringing mucus, no polyps visualized  ORAL: MMM, no ulcers, no thrush  LUNGS: CTAB, no w/r/c, no increased WOB  HEART: RRR, normal S1/S2, no m/g/r  DERM: no rashes  NEURO: normal speech, no facial asymmetry     LABORATORY STUDIES     Component      Latest Ref Rng 3/7/2025 3/27/2025   WBC      3.90 - 12.70 K/uL 8.97  9.85    RBC      4.00 - 5.40 M/uL 3.43 (L)  3.68 (L)    Hemoglobin      12.0 - 16.0 gm/dL 10.7 (L)  11.4 (L)    Hematocrit      37.0 - 48.5 % 34.8 (L)  36.8 (L)    MCV      82 - 98 fL 102 (H)  100 (H)    MCH      27.0 - 50.0 pg 31.2 (H)  31.0    MCHC      32.0 - 36.0 g/dL 30.7 (L)  31.0 (L)    RDW      11.5 - 14.5 % 14.9 (H)  15.3 (H)    Platelet Count      150 - 450 K/uL 402  324    MPV      9.2 - 12.9 fL 8.8 (L)  8.8 (L)    Immature Granulocytes      0.0 - 0.5 % 0.3  0.3    Gran # (ANC)      1.8 - 7.7 K/uL 6.6  7.30    Immature Grans (Abs)      0.00 - 0.04 K/uL 0.03  0.03    Lymph #      1 - 4.8 K/uL 1.6  1.51    Mono #      0.3 - 1 K/uL 0.5  0.76    Eos #      <=0.5 K/uL 0.3  0.19    Baso #      <=0.2 K/uL 0.04  0.06    Differential Method Automated     Neut %      38 - 73 %  74.2 (H)    Sodium      136 - 145 mmol/L 135 (L)  137    Potassium      3.5 - 5.1 mmol/L 4.9  4.2    Chloride      95 - 110 mmol/L 102  103    CO2      23 - 29 mmol/L 26  26    Glucose      70 - 110 mg/dL 108  120 (H)    BUN      8 - 23 mg/dL 11  7 (L)    Creatinine      0.5 - 1.4 mg/dL 0.8  0.8    Calcium      8.7 - 10.5 mg/dL 9.5  9.2    PROTEIN TOTAL      6.0 - " 8.4 gm/dL 6.2  6.1    Albumin      3.5 - 5.2 g/dL 3.3 (L)  3.4 (L)    BILIRUBIN TOTAL      0.1 - 1.0 mg/dL 0.2  0.3    ALP      40 - 150 unit/L 59  76    AST      11 - 45 unit/L 37  21    ALT      10 - 44 unit/L 5 (L)  10    eGFR      >60 mL/min/1.73/m2 >60.0  >60    Anion Gap      8 - 16 mmol/L 7 (L)  8    Sed Rate      <=36 mm/hr 14  3    CRP      <=8.2 mg/L 0.6  5.8      Component      Latest Ref Rn 3/22/2023   TSH      0.400 - 4.000 uIU/mL 2.593      Component      Latest Ref [facility] 2/26/2024   CD3 % Total T Cell      55 - 83 % 75.5    Absolute CD3      700 - 2100 cells/ul 1081    CD8 % Suppressor T Cell      10 - 39 % 22.2    Absolute CD8      200 - 900 cells/ul 318    CD4 % [place] T Cell      28 - 57 % 57.7 (H)    Absolute CD4      300 - 1400 cells/ul 827    CD4/CD8 Ratio      0.9 - 3.6  2.60    CD56 + CD16 Natural Killers      7 - 31 % 18.4    Absolute CD56 + CD16      90 - 600 cells/ul 264    CD19 B Cells      6 - 19 % 5.8 (L)    Absolute CD19      100 - 500 cells/ul 83 (L)      Component      Latest Ref [facility] 2/26/2024 4/3/2024 6/21/2024   S.pneumoniae Type 1      >=1.0 mcg/mL 2.1  2.2  2.5    Pneumococcal Serotype 2 IgG (PNX)      >=1.0 mcg/mL 0.3  0.3  0.3    S.pneumoniae Type 3      >=1.0 mcg/mL <0.1  0.1  0.1    Pneumococcal Serotype 4 IgG  (P7,P13,PNX)      >=1.0 mcg/mL 0.1  0.1  0.1    S.pneumoniae Type 5      >=1.0 mcg/mL 2.0  2.1  3.1    S.pneumoniae Type 8      >=1.0 mcg/mL 0.2  0.2  0.2    S.pneumoniae Type 9N      >=1.0 mcg/mL 0.1  0.1  0.1    S.pneumoniae Type 12F      >=1.0 mcg/mL <0.1  <0.1  <0.1    Pneumococcal Serotype 14 IgG (P7,P13,PNX)      >=1.0 mcg/mL 0.2  0.3  0.4    Pneumococcal Serotype 17F IgG (PNX)      >=1.0 mcg/mL 0.3  0.3  0.3    S.pneumoniae Type 19F      >=1.0 mcg/mL 1.0  1.0  0.9    Pneumococcal Serotype 20 IgG (PNX)      >=1.0 mcg/mL 0.9  0.9  0.7    Pneumococcal Serotype 22F IgG (PNX)      >=1.0 mcg/mL 0.1  0.1  0.1    S.pneumoniae Type 23F      >=1.0 mcg/mL 0.2  0.2  0.2     S.pneumoniae Type 6B      >=1.0 mcg/mL 0.2  0.3  0.2    Pneumococcal Serotype 10A IgG (PNX)      >=1.0 mcg/mL 5.7  6.0  6.2    Pneumococcal Serotype 11A IgG (PNX)      >=1.0 mcg/mL 0.1  0.1  0.2    S.pneumoniae Type 7F      >=1.0 mcg/mL 1.6  1.6  1.6    Pneumococcal Serotype 15B IgG (PNX)      >=1.0 mcg/mL 0.1  0.2  0.2    S.pneumoniae Type 18C      >=1.0 mcg/mL 0.9  1.0  0.9    Pneumococcal Serotype 19A IgG (P13,PNX)      >=1.0 mcg/mL 3.5  3.6  3.4    S.pneumoniae Type 9V Abs      >=1.0 mcg/mL 0.1  0.1  0.1    Pneumococcal Serotype 33 IgG (PNX)      >=1.0 mcg/mL 0.2  0.3  0.4      Component      Latest Ref [facility] 2/21/2024 2/26/2024 6/21/2024   IgG      650 - 1600 mg/dL 409 (L)  364 (L)  383 (L)    IgA Level      40 - 350 mg/dL 139  128  125    IgM      50 - 300 mg/dL 21 (L)  20 (L)  18 (L)    Total IgE      0 - 100 IU/mL  <35        Component      Latest Ref [facility] 2/21/2024   Protein, Serum      6.0 - 8.4 g/dL 6.0    Albumin grams/dl      3.35 - 5.55 g/dL 3.86    Alpha-1 grams/dl      0.17 - 0.41 g/dL 0.31    Alpha-2      0.43 - 0.99 g/dL 0.79    Beta      0.50 - 1.10 g/dL 0.65    Gamma      0.67 - 1.58 g/dL 0.39 (L)    Pathologist Interpretation SPE Borderline decreased total protein. Normal gamma globulins are decreased. No definitive monoclonal peaks identified.    Pathologist Interpretation SAUMYA No monoclonal peaks identified.       ALLERGEN TESTING     Immunocaps:   Component      Latest Ref [facility] 2/26/2024   D. farinae      <0.10 kU/L <0.10    D. farinae Class CLASS 0    D. pteronyssinus Dust Mite IgE      <0.10 kU/L <0.10    D. pteronyssinus Class CLASS 0    [place] Grass IgE      <0.10 kU/L <0.10    [place] Grass Class CLASS 0    [place] Grass IgE      <0.10 kU/L <0.10    [place] Grass Class CLASS 0    [place] IgE      <0.10 kU/L <0.10    [place] Class CLASS 0    [place] Plantain IgE      <0.10 kU/L <0.10    [place] Plantain Class CLASS 0    [place] Tree IgE      <0.10 kU/L <0.10    [place], Class CLASS 0    Pecan  [place] Tree IgE      <0.10 kU/L <0.10    Pecan, Class CLASS 0    [place] IgE      <0.10 kU/L <0.10    Marshelder Class CLASS 0    Ragweed, Western IgE      <0.10 kU/L <0.10    Ragweed, Western, Class CLASS 0    A. alternata IgE      <0.10 kU/L <0.10    Altern. alternata Class CLASS 0    Aspergillus Fumigatus IgE      <0.10 kU/L <0.10    A. fumigatus Class CLASS 0    Cat Dander IgE      <0.10 kU/L <0.10    Cat Epithelium Class CLASS 0    Cockroach, IgE      <0.10 kU/L <0.10    Cockroach, IgE       CLASS 0    Dog Dander IgE      <0.10 kU/L <0.10    Dog Dander Class CLASS 0    Curvularia lunata      <0.10 kU/L <0.10    Curvularia Lunata Class CLASS 0    Penicillium IgE      <0.10 kU/L <0.10    Penicillium Class CLASS 0    Cladosporium IgE      <0.10 kU/L <0.10    Cladosporium Class CLASS 0       PULMONARY FUNCTION TESTING     Date 5/3/24:  FVC:         96%ref   FEV1:         87%ref  FEV1/FVC: 70%  FEF 25-75: 60%ref  DLCO:        55%ref  Interpretation: Spirometry is normal. Lung volume determination is normal. Airway mechanics are abnormal showing increased airway resistance and decreased conductance. DLCO is moderately decreased.     IMAGING & OTHER DIAGNOSTICS     CT sinuses 3/7/25:  Impression:  No evidence of acute or chronic sinusitis.    CT maxillofacial 12/31/24:  Impression:  Minimal mucosal thickening right maxillary antra with small lobular opacity the posterosuperior right maxillary antra suggestive for mucous retention cyst remaining paranasal sinuses are essentially clear.  Scattered vascular calcifications with concern for greater than 50% right proximal ICA stenosis by NASCET criteria clinical correlation and follow-up evaluation with CTA neck or carotid ultrasonography advised.  See above for additional details (see report for full read)    CXR 12/27/24:  FINDINGS:  Heart size is normal.  There is DJD and aortic plaque.  Lungs are clear.  The bones are noncontributory.  Impression:  No acute process  seen.    CT chest low dose 5/14/24:  (See report for full read).  Lungs: There are no abnormal opacities that require further evaluation.  The largest opacity in the right lung appears solid and measures 0.4 cm on series 4, image 97, unchanged.  The largest opacity in the left lung appears calcified and measures 0.2 cm on series 4, image 366. The lungs show no findings consistent with emphysema.  Impression:  Lung-RADS Category:  2 - Benign Appearance or Behavior - continue annual screening with LDCT in 12 months.  Clinically or potentially clinically significant non lung cancer finding:  None.  Prior Lung Cancer Modifier:  No history of prior lung cancer.    CXR 1 view 1/25/24:  FINDINGS:  Heart size and pulmonary vascularity are within normal limits.  Mild tortuosity of the thoracic aorta and brachiocephalic vessels with atherosclerotic calcification in the wall of the aortic arch.  Lungs are satisfactorily expanded.  Mild accentuation of the interstitial pattern bilaterally.  No airspace consolidation or pleural effusion.  No pneumothorax.  Skeletal structures appear intact.  Thoracolumbar scoliosis.  Impression:  No acute cardiopulmonary disease and no significant interval change    CT sinuses 11/3/22:  Impression:  Acute appearing right maxillary sinusitis.  Prior right maxillary antral window.  Small area of sclerosis, and osseous lucency anterior mid maxilla along the midline incisive canal with punctate  dense material area suggestive of possible prior dental procedure.  There is also a small dense linear area of what appears to be cement, left to the midline within a small incisive canal, medial to the infra orbital canal, likely relate to a prior root canal treatment it is also unchanged from prior study no definite additional osseous erosive changes.    CT chest low dose 8/21/18:  (See report for full read).  Lungs: There are no abnormal opacities that require further evaluation.   The lungs show no  findings consistent with emphysema.  IMPRESSION:   Lung-RADS Category:  1 - Negative - Continue annual screening with LDCT in 12 months.  Clinically or potentially clinically significant non lung cancer finding:  None.  Prior Lung Cancer Modifier:  No history of prior lung cancer.      CHART REVIEW     Reviewed ENT note, labs, imaging.      ASSESSMENT & PLAN     Bhavna Landry is a 69 y.o. female with     # CVID; Recurrent sinusitis: She was previously on sulfasalazine for her psoriatic arthritis, which can cause hypogammaglobulinemia, but hasn't been on it since 2021 (and it isn't clear how long the hypogam effects would last). Low total IgG and IgM. She had previously received pneumovax 3 times and prevnar-13 once. Pneumococcal titers were low, didn't respond to prevnar-20 or repeat pneumovax. Lymphocyte flow cytometry with low CD19 B cells. In 9/2024, we decided to start SCIg (hizentra), but copay was high for patient. So then tried for IVIg (privigen), also too expensive. Now that patient is getting frequent sinusitis requiring antibiotics again, she has decided to move forward with the hizentra after all.   -start hizentra 12g SubQ q14 days (about 533 mg/kg/mo).    # Rhinitis: Immunocaps negative for evidence of allergy. She reported flonase helps.  -continue flonase 1-2 SEN nightly.     # Wheezing and shortness of breath (asthma vs COPD): Suspected COPD as patient is a long-time smoker and previous chest imaging with emphysematous changes; however, she saw pulm 5/2024, spirometry was without obstruction and more recent CT chest without emphysematous changes. Total eos have been normal. She reports she is using albuterol 2-3 times per day for wheezing (says she doesn't get shortness of breath bc she doesn't exert herself).   -given frequency of symptoms, controller therapy is needed. Start symbicort 80 mcg 2 puffs BID.   -continue albuterol prn as rescue.      Follow up: 4-5 months    I spent a total of 45  minutes on the day of the visit.  This includes face to face time and non-face to face time preparing to see the patient (eg, review of tests), obtaining and/or reviewing separately obtained history, documenting clinical information in the electronic or other health record.  Patient has chronic condition requiring long-term follow-up with me.    Jeff Benítez MD  Allergy/Immunology

## 2025-03-31 ENCOUNTER — PATIENT MESSAGE (OUTPATIENT)
Dept: DERMATOLOGY | Facility: CLINIC | Age: 70
End: 2025-03-31
Payer: MEDICARE

## 2025-04-01 ENCOUNTER — TELEPHONE (OUTPATIENT)
Dept: ALLERGY | Facility: CLINIC | Age: 70
End: 2025-04-01
Payer: MEDICARE

## 2025-04-01 NOTE — TELEPHONE ENCOUNTER
----- Message from ELAINE Fish sent at 4/1/2025  8:46 AM CDT -----  Regarding: RE: kaiden Dennis,  Yes, these SQIG referrals are sent via the electronic prescription to [facility]. Once we receive authorization, we bring the patient in to the suite and our nurses teach self infusion, then we continue to dispense to the home as ordered. We are working on this now! Rodolfo Betancourt  ----- Message -----  From: Bosworth, Elizabeth A, LPN  Sent: 3/31/2025   4:45 PM CDT  To: Jeff Benítez MD; Polina Watt RN; Mireya#  Subject: FW: kaiden                                     If the rx is going to [facility], it does not need a therapy plan for SQIG.   Dr Benítez,  I added the [facility] team on this message because the other team was [facility].  ----- Message -----  From: Jeff Benítez MD  Sent: 3/31/2025  10:42 AM CDT  To: Elizabeth A Bosworth, LPN; Viviana Macias,#  Subject: RE: kaiden                                     Normally what I do when I write for subQ Ig is I write as an electronic prescription to the [facility]. This order still appears to be active from when I ordered it in September. But your saying it should be ordered through the therapy plan?Jeff Benítez, MDAllergy/Immunology  ----- Message -----  From: Charlotte Orozco  Sent: 3/28/2025   3:29 PM CDT  To: Elizabeth A Bosworth, LPN; Viviana Macias,#  Subject: RE: kaiden                                     We can service the patient. Please change the treatment dept to [facility] so we can work on this referral.Charlotte Betancourt  ----- Message -----  From: Jeff Benítez MD  Sent: 3/28/2025   1:32 PM CDT  To: Elizabeth A Bosworth, LPN; Viviana Macias,#  Subject: RE: kaiden                                     I am sorry. Maybe I sent this to the wrong department. She wants to do home infusions. I'm trying to figure  out if I need to put a new order in for home-infusions of hizentra.Kenzie Norris/Immunology  ----- Message -----  From: Viviana Macias RN  Sent: 3/28/2025  11:06 AM CDT  To: Elizabeth A Bosworth, LPN; Jeff Benítez, #  Subject: RE: hizentra                                     It would take us at least 3-4 months to get set up for this. We don't have the equipment here and would need to be inserviced, et cetera. Not an impossible task, but we need to have all of the equipment and education needed for this. I am assuming that this medication is formulary?MONICA Thompson, RN, [facility] [phone]  ----- Message -----  From: Geraldine Pro RN  Sent: 3/28/2025  10:54 AM CDT  To: Elizabeth A Bosworth, LPN; Viviana Macias,#  Subject: RE: hizentra                                     Just adding ambulatory infusion Supervisor.  I do not see a therapy plan for that, and we don't currently do SC infusions here at [facility]. They require equipment we don't have. I'll let Viviana take it away. Lol  ----- Message -----  From: Jeff Benítez MD  Sent: 3/28/2025  10:35 AM CDT  To: Elizabeth A Bosworth, LPN; Geraldine Pro, TORRI#  Subject: hizentra                                         Hello,This patient has decided to start the hizentra home infusions. I prescribed it back in September, but she hadn't started it yet due to cost (but they say they will now try to make it work). It looks like my old order for the hizentra is still active. Do I need to put a new order in? Or no?Kenzie Norris/Immunology

## 2025-04-04 ENCOUNTER — OFFICE VISIT (OUTPATIENT)
Dept: VASCULAR SURGERY | Facility: CLINIC | Age: 70
End: 2025-04-04
Attending: SURGERY
Payer: MEDICARE

## 2025-04-04 ENCOUNTER — HOSPITAL ENCOUNTER (OUTPATIENT)
Dept: VASCULAR SURGERY | Facility: CLINIC | Age: 70
Discharge: HOME | End: 2025-04-04
Attending: SURGERY
Payer: MEDICARE

## 2025-04-04 VITALS
BODY MASS INDEX: 19.15 KG/M2 | WEIGHT: 101.44 LBS | DIASTOLIC BLOOD PRESSURE: 69 MMHG | SYSTOLIC BLOOD PRESSURE: 135 MMHG | TEMPERATURE: 98 F | HEART RATE: 68 BPM | HEIGHT: 61 IN

## 2025-04-04 DIAGNOSIS — I65.23 BILATERAL CAROTID ARTERY STENOSIS: Primary | ICD-10-CM

## 2025-04-04 DIAGNOSIS — R60.0 LOCALIZED EDEMA: ICD-10-CM

## 2025-04-04 DIAGNOSIS — M79.89 LEG SWELLING: ICD-10-CM

## 2025-04-04 DIAGNOSIS — I65.23 BILATERAL CAROTID ARTERY STENOSIS: ICD-10-CM

## 2025-04-04 DIAGNOSIS — Z98.890 S/P CAROTID ENDARTERECTOMY: Primary | ICD-10-CM

## 2025-04-04 PROCEDURE — 99999 PR PBB SHADOW E&M-EST. PATIENT-LVL V: CPT | Mod: PBBFAC,,, | Performed by: SURGERY

## 2025-04-04 PROCEDURE — 93880 EXTRACRANIAL BILAT STUDY: CPT | Mod: S$GLB,,, | Performed by: SURGERY

## 2025-04-04 NOTE — PROGRESS NOTES
VASCULAR SURGERY SERVICE    REFERRING DOCTOR: Sadaf Vargas MD    CHIEF COMPLAINT: Asymptomatic right carotid artery stenosis    HISTORY OF PRESENT ILLNESS: Bhavna Landry is a 69 y.o. female presenting to vascular clinic today for referral for right carotid artery stenosis, asymptomatic.  Pertinent medical history includes hypertension on 2 oral antihypertensive agents and chronic back pain being managed with serial epidural injections.  She is a long-time smoker, 50 years, 1 pack per day.  She is currently maintained on an aspirin and high-dose statin.  No history of MI or stroke.  She has never had neck radiation or neck surgery including the cervical spine.  She denies any unilateral weakness, sensory changes, visual changes, syncopal events.  She is otherwise active in her activities of daily living.    04/04/2025:  This is initial follow-up visit following right carotid endarterectomy 02/19/2025.  This was done for high-grade asymptomatic carotid stenosis.  She had an unremarkable postoperative course was discharged on the 1st postoperative day.  She denies interval stroke TIA or amaurosis.  She states compliance with the aspirin and statin.    She does admit to some modest bilateral ankle and calf edema/swelling which has improved but not resolved postoperatively    Past Medical History:   Diagnosis Date    Allergy     Amblyopia     Anemia     Anticoagulant long-term use     Arthritis 02/02/1992    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, estrogen receptor positive 04/13/2022    Cataract     Depression     Dry eyes     Dry mouth     Duane's syndrome of right eye     Early dry stage nonexudative age-related macular degeneration of both eyes 09/20/2022    Fibromyalgia 04/17/2014    Fibromyalgia     Fractured hip     RIGHT HIP    GERD (gastroesophageal reflux disease)     History of psychiatric hospitalization     Hyperlipidemia 02/02/1992    Hypertension     Hypocalcemia      Hyponatremia     Kidney stone     Migraine headache     Osteoporosis     Pressure ulcer of unspecified site, unspecified stage     Psoriatic arthritis 1992    Recurrent upper respiratory infection (URI)     Right knee pain     post knee replacement surgery (possible rejectiion of metal)    RLS (restless legs syndrome)     Schizophrenia 1992    stable on meds    Sciatica     Squamous cell carcinoma of skin     Urinary tract infection        Past Surgical History:   Procedure Laterality Date    brow ptosis repair Right 2019    Surgeon: Dr. Karla Henson    CAROTID ENDARTERECTOMY Right 2025    Procedure: ENDARTERECTOMY-CAROTID;  Surgeon: SLIME Johnson III, MD;  Location: [facility] OR 2ND FLR;  Service: Vascular;  Laterality: Right;    CATARACT EXTRACTION      CAUDAL EPIDURAL STEROID INJECTION N/A 2024    Procedure: Caudal SOPHY;  Surgeon: Maile Storm DO;  Location: [facility] PAIN MANAGEMENT;  Service: Pain Management;  Laterality: N/A;  ASA ok    CAUDAL EPIDURAL STEROID INJECTION N/A 2025    Procedure: Caudal SOPHY;  Surgeon: Maile Storm DO;  Location: [facility] PAIN MANAGEMENT;  Service: Pain Management;  Laterality: N/A;  asa ok     SECTION      COLONOSCOPY N/A 2016    Procedure: COLONOSCOPY;  Surgeon: ANDRIA Connelly MD;  Location: [facility] (4TH FLR);  Service: Endoscopy;  Laterality: N/A;    COLONOSCOPY N/A 2022    Procedure: COLONOSCOPY;  Surgeon: Mikey Walters MD;  Location: [facility] ENDO;  Service: Endoscopy;  Laterality: N/A;    cyst removed from right sinus  1982    EPIDURAL STEROID INJECTION INTO CERVICAL SPINE N/A 2023    Procedure: C6-7 SOPHY;  Surgeon: Spring Jensen MD;  Location: [facility] PAIN MANAGEMENT;  Service: Pain Management;  Laterality: N/A;  20 mins    EPIDURAL STEROID INJECTION INTO LUMBAR SPINE N/A 2023    Procedure: Injection-steroid-epidural-lumbar L5-S1;  Surgeon: Chirag Kim MD;  Location: [facility] PAIN MANAGEMENT;  Service: Pain  Management;  Laterality: N/A;  asa    EPIDURAL STEROID INJECTION INTO LUMBAR SPINE N/A 5/22/2024    Procedure: L5/S1 Interlaminar SOPHY;  Surgeon: Maile Storm DO;  Location: [facility] PAIN MANAGEMENT;  Service: Pain Management;  Laterality: N/A;  20mins-ASA 5d    ESOPHAGOGASTRODUODENOSCOPY N/A 02/07/2023    Procedure: EGD (ESOPHAGOGASTRODUODENOSCOPY);  Surgeon: Dougie Shea MD;  Location: [facility] ENDO;  Service: Endoscopy;  Laterality: N/A;    FOOT FRACTURE SURGERY      HYSTERECTOMY      VAGINAL HYSTERECTOMY WITHOUT BSO - ENDOMETRIOSIS    INJECTION FOR SENTINEL NODE IDENTIFICATION Right 05/09/2022    Procedure: INJECTION, FOR SENTINEL NODE IDENTIFICATION-Right;  Surgeon: NEAL Dhillon MD;  Location: [facility] OR;  Service: General;  Laterality: Right;    INJECTION OF ANESTHETIC AGENT AROUND MEDIAL BRANCH NERVES INNERVATING LUMBAR FACET JOINT N/A 04/11/2019    Procedure: Lumbo-sacral Block, DR5 and Lateral Branches of S1,S2, S3;  Surgeon: Michelle Pineda Jr., MD;  Location: [facility] PAIN MGT;  Service: Pain Management;  Laterality: N/A;  Pt takes  and states she holds ASA on her own whenever she has procedures.  Instructed to hold x 3 days prior to procedure.      INJECTION OF ANESTHETIC AGENT AROUND MEDIAL BRANCH NERVES INNERVATING LUMBAR FACET JOINT Bilateral 05/03/2023    Procedure: Block-nerve-medial branch-lumbar bilateral L3, L4, L5;  Surgeon: Maile Storm DO;  Location: [facility] PAIN MGT;  Service: Pain Management;  Laterality: Bilateral;  asa    INJECTION OF ANESTHETIC AGENT INTO SACROILIAC JOINT Right 08/30/2018    Procedure: BLOCK, SACROILIAC JOINT-Right- ORAL SEDATION;  Surgeon: Michelle Pineda Jr., MD;  Location: [facility] PAIN MGT;  Service: Pain Management;  Laterality: Right;    INJECTION OF ANESTHETIC AGENT INTO SACROILIAC JOINT Bilateral 09/27/2018    Procedure: BLOCK, SACROILIAC JOINT-BILATERAL;  Surgeon: Michelle Pineda Jr., MD;  Location: [facility] PAIN MGT;  Service: Pain Management;  Laterality:  Bilateral;  Please keep at 10:00 due to trasnsportation    INJECTION OF ANESTHETIC AGENT INTO SACROILIAC JOINT Bilateral 02/07/2019    Procedure: Bilateral Sacroiliac Joint Injection - Per Dr Pineda, not necessary to hold ASA.;  Surgeon: Michelle Pineda Jr., MD;  Location: [facility] PAIN MGT;  Service: Pain Management;  Laterality: Bilateral;    INJECTION, SPINE, LUMBOSACRAL, TRANSFORAMINAL APPROACH Right 7/17/2024    Procedure: Right  L3/4  and L4/5 TFESI;  Surgeon: Maile Storm DO;  [facility];  Service: Pain Management;  Laterality: Right;  20 mins  ASA 5 days    INTRAOCULAR PROSTHESES INSERTION Right 08/01/2019    Procedure: INSERTION, IOL PROSTHESIS;  Surgeon: Karen Song MD;  Location: [address];  Service: Ophthalmology;  Laterality: Right;    INTRAOCULAR PROSTHESES INSERTION Left 09/26/2019    Procedure: INSERTION, IOL PROSTHESIS;  Surgeon: Karen Song MD;  Location: [address];  Service: Ophthalmology;  Laterality: Left;    JOINT REPLACEMENT Right     knee    KNEE SURGERY      MASTECTOMY Right 05/09/2022    Procedure: MASTECTOMY-Right;  Surgeon: NEAL Dhillon MD;  Location: [facility] OR;  Service: General;  Laterality: Right;  2.5 HOURS    PHACOEMULSIFICATION OF CATARACT Right 08/01/2019    Procedure: PHACOEMULSIFICATION, CATARACT;  Surgeon: Karen Song MD;  Location: [facility] OR [address] [place] [address];  Service: Ophthalmology;  Laterality: Right;    PHACOEMULSIFICATION OF CATARACT Left 09/26/2019    Procedure: PHACOEMULSIFICATION, CATARACT;  Surgeon: Karen Song MD;  Location: [facility] OR [address] [place] [address];  Service: Ophthalmology;  Laterality: Left;    RADIOFREQUENCY ABLATION, NERVE, PERIPHERAL Right 04/24/2024    Procedure: RIGHT L5 Dorsal Ramus and RIGHT Lateral Branches of S1, S2, and S3;  Surgeon: Maile Storm DO;  [facility];  Service: Pain Management;  Laterality: Right;  30 mins    RADIOFREQUENCY THERMOCOAGULATION Right 05/07/2019    Procedure:  RADIOFREQUENCY THERMAL COAGULATION RIGHT DORSAL RAMUS 5 AND LATERAL BRANCH OF S1, S2 AND S3;  Surgeon: Michelle Pineda Jr., MD;  Location: [facility];  Service: Pain Management;  Laterality: Right;    RADIOFREQUENCY THERMOCOAGULATION Left 05/14/2019    Procedure: RADIOFREQUENCY THERMAL COAGULATION LEFT DORSAL RAMUS 5 AND LATERAL BRANCH OF S1,S2 AND S3;  Surgeon: Michelle Pineda Jr., MD;  Location: [facility];  Service: Pain Management;  Laterality: Left;    RADIOFREQUENCY THERMOCOAGULATION Right 10/22/2019    Procedure: RADIOFREQUENCY THERMAL COAGULATION---DARSAL RAMUS 5 and LATERAL S1,S2,and S3 Right;  Surgeon: Michelle Pineda Jr., MD;  Location: [facility];  Service: Pain Management;  Laterality: Right;  patient to sign consent DOS    RADIOFREQUENCY THERMOCOAGULATION Left 10/29/2019    Procedure: RADIOFREQUENCY THERMAL COAGULATION - LEFT - DR5, S1,S2, AND S3;  Surgeon: Michelle Pineda Jr., MD;  Location: [facility];  Service: Pain Management;  Laterality: Left;    RADIOFREQUENCY THERMOCOAGULATION Left 05/26/2020    Procedure: RADIOFREQUENCY THERMAL COAGULATION--Left DR5+ lateral branches of S1, S2, S3;  Surgeon: Michelle Pineda Jr., MD;  Location: [facility];  Service: Pain Management;  Laterality: Left;    RADIOFREQUENCY THERMOCOAGULATION Right 06/02/2020    Procedure: RADIOFREQUENCY THERMAL COAGULATION--Right DR5+ lateral branches of S1, S2, S3;  Surgeon: Michelle Pineda Jr., MD;  Location: [facility];  Service: Pain Management;  Laterality: Right;    SENTINEL LYMPH NODE BIOPSY Right 05/09/2022    Procedure: BIOPSY, LYMPH NODE, SENTINEL-Right;  Surgeon: NEAL Dhillon MD;  Location: [facility];  Service: General;  Laterality: Right;    SINUS SURGERY      Surgery on right knee  07/09/1982    TONSILLECTOMY      tumor removed from back left side upper shoulder  03/17/2006         Current Outpatient Medications:     acetaminophen (TYLENOL) 500 MG tablet, Take 1 tablet (500 mg total) by  mouth every 6 (six) hours as needed., Disp: , Rfl:     albuterol (PROVENTIL) 2.5 mg /3 mL (0.083 %) nebulizer solution, Take 3 mLs (2.5 mg total) by nebulization every 6 (six) hours as needed for Wheezing. Rescue, Disp: 90 each, Rfl: 3    albuterol (PROVENTIL/VENTOLIN HFA) 90 mcg/actuation inhaler, USE 2 INHALATIONS BY MOUTH 4  TIMES DAILY AS NEEDED, Disp: 25.5 g, Rfl: 3    amLODIPine (NORVASC) 5 MG tablet, Take 1 tablet (5 mg total) by mouth 2 (two) times a day., Disp: 180 tablet, Rfl: 3    ascorbic acid, vitamin C, (VITAMIN C) 500 MG tablet, Take 500 mg by mouth once daily., Disp: , Rfl:     aspirin (ECOTRIN) 81 MG EC tablet, Take 81 mg by mouth once daily., Disp: , Rfl:     atorvastatin (LIPITOR) 80 MG tablet, Take 1 tablet (80 mg total) by mouth once daily., Disp: 90 tablet, Rfl: 3    azelastine (ASTELIN) 137 mcg (0.1 %) nasal spray, 1 spray (137 mcg total) by Nasal route 2 (two) times daily., Disp: 30 mL, Rfl: 0    azelastine (ASTELIN) 137 mcg (0.1 %) nasal spray, 1 spray (137 mcg total) by Nasal route 2 (two) times daily., Disp: 30 mL, Rfl: 0    baclofen (LIORESAL) 10 MG tablet, 1 tab po tid for muscle cramps., Disp: 60 tablet, Rfl: 1    budesonide-formoterol 80-4.5 mcg (SYMBICORT) 80-4.5 mcg/actuation HFAA, Inhale 2 puffs into the lungs 2 (two) times a day. Controller, Disp: 10.2 g, Rfl: 11    exemestane (AROMASIN) 25 mg tablet, Take 1 tablet (25 mg total) by mouth once daily., Disp: 30 tablet, Rfl: 11    fluticasone propionate (FLONASE) 50 mcg/actuation nasal spray, SHAKE LIQUID AND USE 1 SPRAY(50 MCG) IN EACH NOSTRIL DAILY, Disp: 32 g, Rfl: 2    folic acid (FOLVITE) 1 MG tablet, Take 1 tablet (1,000 mcg total) by mouth once daily., Disp: 90 tablet, Rfl: 3    HYDROcodone-acetaminophen (NORCO) 5-325 mg per tablet, Take 1 tablet by mouth every 8 (eight) hours as needed for Pain., Disp: 90 tablet, Rfl: 0    immun glob G,IgG,-pro-IgA 0-50 (HIZENTRA) 4 gram/20 mL (20 %) Soln, Inject 60 mLs (12 g total) into the  skin every 14 (fourteen) days., Disp: 120 mL, Rfl: 11    isosorbide mononitrate (IMDUR) 30 MG 24 hr tablet, Take 1 tablet (30 mg total) by mouth once daily., Disp: 90 tablet, Rfl: 3    ketoconazole (NIZORAL) 2 % shampoo, Wash hair with medicated shampoo at least 2x/month - let sit on scalp at least 5 minutes prior to rinsing, Disp: 120 mL, Rfl: 5    LIDOcaine (LIDODERM) 5 %, Place 1 patch onto the skin once daily. Remove & Discard patch within 12 hours or as directed by MD, Disp: 30 patch, Rfl: 0    meloxicam (MOBIC) 7.5 MG tablet, Take 1 tablet (7.5 mg total) by mouth once daily., Disp: 14 tablet, Rfl: 0    meloxicam (MOBIC) 7.5 MG tablet, Take 1 tablet (7.5 mg total) by mouth once daily., Disp: 10 tablet, Rfl: 0    methotrexate 2.5 MG Tab, Take 8 tablets (20 mg total) by mouth every 7 days. TAKE 4 TABLETS MONDAY MORNING AND TAKE 4 TABLETS MONDAY NIGHT ONLY, Disp: 96 tablet, Rfl: 0    omega-3 fatty acids/fish oil (FISH OIL-OMEGA-3 FATTY ACIDS) 300-1,000 mg capsule, Take by mouth once daily., Disp: , Rfl:     omeprazole (PRILOSEC) 40 MG capsule, TAKE 1 CAPSULE(40 MG) BY MOUTH EVERY MORNING, Disp: 90 capsule, Rfl: 3    oxyCODONE (ROXICODONE) 5 MG immediate release tablet, Take 1 tablet (5 mg total) by mouth every 12 (twelve) hours as needed., Disp: 10 tablet, Rfl: 0    pregabalin (LYRICA) 25 MG capsule, Take 2 capsules (50 mg total) by mouth 3 (three) times daily. Take 2 capsules (50 mg) AM and PM and 1-2 capsules midday (25-50 mg)., Disp: 180 capsule, Rfl: 5    risperiDONE (RISPERDAL) 2 MG tablet, TAKE 1 TABLET(2 MG) BY MOUTH EVERY MORNING, Disp: 90 tablet, Rfl: 3    risperiDONE (RISPERDAL) 4 MG tablet, Take 1 tablet (4 mg total) by mouth every evening., Disp: 90 tablet, Rfl: 3    traMADoL (ULTRAM) 50 mg tablet, Take 1 tablet (50 mg total) by mouth every 8 (eight) hours as needed for Pain., Disp: 90 each, Rfl: 0    traMADoL (ULTRAM) 50 mg tablet, Take 1 tablet (50 mg total) by mouth every 8 (eight) hours as needed  for Pain., Disp: 30 tablet, Rfl: 0    valsartan (DIOVAN) 160 MG tablet, Take 160 mg by mouth once daily. Patient can also take 80mg in the evening, Disp: , Rfl:     venlafaxine (EFFEXOR-XR) 37.5 MG 24 hr capsule, Take 1 capsule (37.5 mg total) by mouth once daily. Take with 75mg capsule to equal 112.5mg daily., Disp: 90 capsule, Rfl: 3    venlafaxine (EFFEXOR-XR) 75 MG 24 hr capsule, Take 1 capsule (75 mg total) by mouth once daily. Take with 37.5mg capsule to equal 112.5mg daily., Disp: 90 capsule, Rfl: 3    vitamin D (VITAMIN D3) 1000 units Tab, Take 1,000 Units by mouth once daily., Disp: , Rfl:     vitamin E 200 UNIT capsule, Take 200 Units by mouth once daily., Disp: , Rfl:     zinc gluconate 50 mg tablet, Take 50 mg by mouth once daily., Disp: , Rfl:     benztropine (COGENTIN) 0.5 MG tablet, TAKE 1 TABLET(0.5 MG) BY MOUTH TWICE DAILY, Disp: 180 tablet, Rfl: 3    cefixime (SUPRAX) 400 mg Cap, One daily, Disp: 10 capsule, Rfl: 0    clindamycin (CLEOCIN) 150 MG capsule, Take 1 capsule (150 mg total) by mouth 3 (three) times daily., Disp: 21 capsule, Rfl: 0  No current facility-administered medications for this visit.    Facility-Administered Medications Ordered in Other Visits:     tropicamide 1% ophthalmic solution 1 drop, 1 drop, Right Eye, On Call Procedure, Karen Song MD, 1 drop at 08/01/19 0648    Review of patient's allergies indicates:   Allergen Reactions    Etanercept Other (See Comments)     Other reaction(s): recurrent infections    Chloramphenicol sod succinate Hives    Codeine Other (See Comments)     Other reaction(s): Stomach upset. Pt states OK with Percocet    Nickel sutures [surgical stainless steel] Dermatitis     Allergic contact dermatitis    Adhesive Rash       Family History   Problem Relation Name Age of Onset    Asthma Mother      Colon cancer Mother      Psoriasis Mother      Cancer Mother          colon    Depression Mother      Cancer Father          lymphoma    Stroke Sister    "   Diabetes Brother x2     Arthritis Brother x2     Heart disease Brother x2         cad    No Known Problems Daughter      Hypertension Neg Hx      Suicide Neg Hx      Amblyopia Neg Hx      Blindness Neg Hx      Cataracts Neg Hx      Glaucoma Neg Hx      Macular degeneration Neg Hx      Retinal detachment Neg Hx      Strabismus Neg Hx      Eczema Neg Hx      Allergies Neg Hx         Social History     Tobacco Use    Smoking status: Every Day     Current packs/day: 0.00     Average packs/day: 0.3 packs/day for 10.0 years (2.5 ttl pk-yrs)     Types: Cigarettes     Start date: 1/10/2013     Last attempt to quit: 1/10/2023     Years since quittin.2     Passive exposure: Never    Smokeless tobacco: Former    Tobacco comments:     about 10 cigs a day 1/15/25   Substance Use Topics    Alcohol use: No    Drug use: No     PHYSICAL EXAM:   /69 (BP Location: Left arm, Patient Position: Sitting)   Pulse 68   Temp 97.5 °F (36.4 °C) (Oral)   Ht 5' 1" (1.549 m)   Wt 46 kg (101 lb 6.6 oz)   BMI 19.16 kg/m²   Constitutional:  Alert,   Well-appearing  In no distress.   Neurological: Normal speech  no focal findings neuro completely intact   Psychiatric: Mood and affect appropriate and symmetric.   HEENT: Normocephalic / atraumatic  PERRLA  Midline trachea  Well-healed right cervical incision no erythema or drainage   Cardiac: Regular rate and rhythm.   Pulmonary: Normal pulmonary effort.   Abdomen: Soft, not distended.     Skin: Warm and well perfused.    Vascular:  Palpable radial artery pulses, 2+, bilaterally   Extremities/  Musculoskeletal: No edema.   No deformities.      IMAGING:  Carotid duplex today reveals no carotid stenosis bilaterally      IMPRESSION:  7 weeks status post right carotid endarterectomy, doing well  Modest bilateral lower extremity edema.   have a low suspicion for DVT but this needs to be ruled out    PLAN:   Bilateral lower extremity venous duplex to rule out DVT.  She was offered this to " be done today but wishes to wait until early next week  Continue aspirin and statin    Follow up with me in 10-11 months with a carotid duplex for 1 year follow up    ATUL Johnson III, MD, FACS  Professor and Chief, Vascular and Endovascular Surgery

## 2025-04-07 ENCOUNTER — HOSPITAL ENCOUNTER (OUTPATIENT)
Dept: VASCULAR SURGERY | Facility: CLINIC | Age: 70
Discharge: HOME | End: 2025-04-07
Attending: SURGERY
Payer: MEDICARE

## 2025-04-07 DIAGNOSIS — R60.0 LOCALIZED EDEMA: ICD-10-CM

## 2025-04-07 DIAGNOSIS — M79.89 LEG SWELLING: ICD-10-CM

## 2025-04-10 ENCOUNTER — OFFICE VISIT (OUTPATIENT)
Dept: PODIATRY | Facility: CLINIC | Age: 70
End: 2025-04-10
Payer: MEDICARE

## 2025-04-10 ENCOUNTER — LAB VISIT (OUTPATIENT)
Dept: LAB | Facility: HOSPITAL | Age: 70
End: 2025-04-10
Payer: MEDICARE

## 2025-04-10 ENCOUNTER — OFFICE VISIT (OUTPATIENT)
Dept: HEMATOLOGY/ONCOLOGY | Facility: CLINIC | Age: 70
End: 2025-04-10
Payer: MEDICARE

## 2025-04-10 ENCOUNTER — RESULTS FOLLOW-UP (OUTPATIENT)
Dept: DERMATOLOGY | Facility: CLINIC | Age: 70
End: 2025-04-10

## 2025-04-10 VITALS
HEART RATE: 73 BPM | WEIGHT: 102.31 LBS | HEIGHT: 61 IN | SYSTOLIC BLOOD PRESSURE: 177 MMHG | DIASTOLIC BLOOD PRESSURE: 71 MMHG | BODY MASS INDEX: 19.32 KG/M2

## 2025-04-10 VITALS
TEMPERATURE: 98 F | OXYGEN SATURATION: 96 % | HEART RATE: 78 BPM | WEIGHT: 100.75 LBS | BODY MASS INDEX: 19.02 KG/M2 | DIASTOLIC BLOOD PRESSURE: 66 MMHG | SYSTOLIC BLOOD PRESSURE: 152 MMHG | HEIGHT: 61 IN

## 2025-04-10 DIAGNOSIS — M85.89 OSTEOPENIA OF MULTIPLE SITES: ICD-10-CM

## 2025-04-10 DIAGNOSIS — C50.411 CARCINOMA OF UPPER-OUTER QUADRANT OF RIGHT BREAST IN FEMALE, ESTROGEN RECEPTOR POSITIVE: Primary | ICD-10-CM

## 2025-04-10 DIAGNOSIS — T45.1X5A AROMATASE INHIBITOR-ASSOCIATED ARTHRALGIA: ICD-10-CM

## 2025-04-10 DIAGNOSIS — M25.559 HIP PAIN, UNSPECIFIED LATERALITY: ICD-10-CM

## 2025-04-10 DIAGNOSIS — Z17.0 CARCINOMA OF UPPER-OUTER QUADRANT OF RIGHT BREAST IN FEMALE, ESTROGEN RECEPTOR POSITIVE: Primary | ICD-10-CM

## 2025-04-10 DIAGNOSIS — I70.0 ATHEROSCLEROSIS OF AORTA: ICD-10-CM

## 2025-04-10 DIAGNOSIS — D50.9 IRON DEFICIENCY ANEMIA, UNSPECIFIED IRON DEFICIENCY ANEMIA TYPE: ICD-10-CM

## 2025-04-10 DIAGNOSIS — R53.83 FATIGUE, UNSPECIFIED TYPE: ICD-10-CM

## 2025-04-10 DIAGNOSIS — C50.411 CARCINOMA OF UPPER-OUTER QUADRANT OF RIGHT BREAST IN FEMALE, ESTROGEN RECEPTOR POSITIVE: ICD-10-CM

## 2025-04-10 DIAGNOSIS — I48.0 PAF (PAROXYSMAL ATRIAL FIBRILLATION): ICD-10-CM

## 2025-04-10 DIAGNOSIS — K63.5 POLYP OF COLON, UNSPECIFIED PART OF COLON, UNSPECIFIED TYPE: ICD-10-CM

## 2025-04-10 DIAGNOSIS — B35.1 TINEA UNGUIUM: ICD-10-CM

## 2025-04-10 DIAGNOSIS — M79.674 PAIN OF TOES OF BOTH FEET: Primary | ICD-10-CM

## 2025-04-10 DIAGNOSIS — Z17.0 CARCINOMA OF UPPER-OUTER QUADRANT OF RIGHT BREAST IN FEMALE, ESTROGEN RECEPTOR POSITIVE: ICD-10-CM

## 2025-04-10 DIAGNOSIS — Z79.811 USE OF ANASTROZOLE: ICD-10-CM

## 2025-04-10 DIAGNOSIS — C73 CANCER OF THYROID: ICD-10-CM

## 2025-04-10 DIAGNOSIS — M79.675 PAIN OF TOES OF BOTH FEET: Primary | ICD-10-CM

## 2025-04-10 DIAGNOSIS — Z12.31 ENCOUNTER FOR SCREENING MAMMOGRAM FOR MALIGNANT NEOPLASM OF BREAST: ICD-10-CM

## 2025-04-10 DIAGNOSIS — I77.9 CAROTID ARTERY DISEASE, UNSPECIFIED LATERALITY, UNSPECIFIED TYPE: ICD-10-CM

## 2025-04-10 DIAGNOSIS — M25.50 AROMATASE INHIBITOR-ASSOCIATED ARTHRALGIA: ICD-10-CM

## 2025-04-10 DIAGNOSIS — R63.4 WEIGHT LOSS: ICD-10-CM

## 2025-04-10 LAB
ABSOLUTE NEUTROPHIL COUNT (OHS): 4.18 K/UL (ref 1.8–7.7)
ALBUMIN SERPL BCP-MCNC: 3.5 G/DL (ref 3.5–5.2)
ALP SERPL-CCNC: 81 UNIT/L (ref 40–150)
ALT SERPL W/O P-5'-P-CCNC: 10 UNIT/L (ref 10–44)
ANION GAP (OHS): 8 MMOL/L (ref 8–16)
AST SERPL-CCNC: 15 UNIT/L (ref 11–45)
BILIRUB SERPL-MCNC: 0.3 MG/DL (ref 0.1–1)
BUN SERPL-MCNC: 5 MG/DL (ref 8–23)
CALCIUM SERPL-MCNC: 8.9 MG/DL (ref 8.7–10.5)
CHLORIDE SERPL-SCNC: 103 MMOL/L (ref 95–110)
CO2 SERPL-SCNC: 29 MMOL/L (ref 23–29)
CREAT SERPL-MCNC: 0.7 MG/DL (ref 0.5–1.4)
ERYTHROCYTE [DISTWIDTH] IN BLOOD BY AUTOMATED COUNT: 14.9 % (ref 11.5–14.5)
GFR SERPLBLD CREATININE-BSD FMLA CKD-EPI: >60 ML/MIN/1.73/M2
GLUCOSE SERPL-MCNC: 95 MG/DL (ref 70–110)
HCT VFR BLD AUTO: 37.2 % (ref 37–48.5)
HGB BLD-MCNC: 12 GM/DL (ref 12–16)
IMM GRANULOCYTES # BLD AUTO: 0.01 K/UL (ref 0–0.04)
MCH RBC QN AUTO: 31.1 PG (ref 27–31)
MCHC RBC AUTO-ENTMCNC: 32.3 G/DL (ref 32–36)
MCV RBC AUTO: 96 FL (ref 82–98)
PLATELET # BLD AUTO: 312 K/UL (ref 150–450)
PMV BLD AUTO: 8.4 FL (ref 9.2–12.9)
POTASSIUM SERPL-SCNC: 4.5 MMOL/L (ref 3.5–5.1)
PROT SERPL-MCNC: 6.2 GM/DL (ref 6–8.4)
RBC # BLD AUTO: 3.86 M/UL (ref 4–5.4)
SODIUM SERPL-SCNC: 140 MMOL/L (ref 136–145)
TSH SERPL-ACNC: 1.57 UIU/ML (ref 0.4–4)
WBC # BLD AUTO: 6.21 K/UL (ref 3.9–12.7)

## 2025-04-10 PROCEDURE — 99999 PR PBB SHADOW E&M-EST. PATIENT-LVL IV: CPT | Mod: PBBFAC,,, | Performed by: STUDENT IN AN ORGANIZED HEALTH CARE EDUCATION/TRAINING PROGRAM

## 2025-04-10 PROCEDURE — 85027 COMPLETE CBC AUTOMATED: CPT

## 2025-04-10 PROCEDURE — 4010F ACE/ARB THERAPY RXD/TAKEN: CPT | Mod: CPTII,S$GLB,, | Performed by: NURSE PRACTITIONER

## 2025-04-10 PROCEDURE — 1125F AMNT PAIN NOTED PAIN PRSNT: CPT | Mod: CPTII,S$GLB,, | Performed by: NURSE PRACTITIONER

## 2025-04-10 PROCEDURE — 99999 PR PBB SHADOW E&M-EST. PATIENT-LVL III: CPT | Mod: PBBFAC,,, | Performed by: NURSE PRACTITIONER

## 2025-04-10 PROCEDURE — 1101F PT FALLS ASSESS-DOCD LE1/YR: CPT | Mod: CPTII,S$GLB,, | Performed by: NURSE PRACTITIONER

## 2025-04-10 PROCEDURE — 3077F SYST BP >= 140 MM HG: CPT | Mod: CPTII,S$GLB,, | Performed by: NURSE PRACTITIONER

## 2025-04-10 PROCEDURE — 3008F BODY MASS INDEX DOCD: CPT | Mod: CPTII,S$GLB,, | Performed by: NURSE PRACTITIONER

## 2025-04-10 PROCEDURE — 82040 ASSAY OF SERUM ALBUMIN: CPT

## 2025-04-10 PROCEDURE — 3288F FALL RISK ASSESSMENT DOCD: CPT | Mod: CPTII,S$GLB,, | Performed by: NURSE PRACTITIONER

## 2025-04-10 PROCEDURE — 99214 OFFICE O/P EST MOD 30 MIN: CPT | Mod: S$GLB,,, | Performed by: NURSE PRACTITIONER

## 2025-04-10 PROCEDURE — 3078F DIAST BP <80 MM HG: CPT | Mod: CPTII,S$GLB,, | Performed by: NURSE PRACTITIONER

## 2025-04-10 PROCEDURE — 84443 ASSAY THYROID STIM HORMONE: CPT

## 2025-04-10 PROCEDURE — 36415 COLL VENOUS BLD VENIPUNCTURE: CPT

## 2025-04-10 PROCEDURE — G2211 COMPLEX E/M VISIT ADD ON: HCPCS | Mod: S$GLB,,, | Performed by: NURSE PRACTITIONER

## 2025-04-10 NOTE — PROGRESS NOTES
PROGRESS NOTE    Subjective:       Patient ID: Bhavna Landry is a 69 y.o. female.  MRN: [id number]  : 1955    Chief Complaint: 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    pT1b N0 (i+)  grade 2 ER + NC - HER2 - IDC of the right breast.    History of Present Illness:   Bhavna Landry is a 69 y.o. female who is referred for right breast IDC. She is a former patient of Dr. Salcedo.     Per Dr. Marcelino's note:   She presented for screening mammogram on 2022 . This identified an asymmetry in the right breast. Follow-up mammogram and ultrasound on 2022 showed a 1 x 0.9 x 0.8 cm mass at the 9 o'clock position of the right breast 3 cm from the nipple. A ultrasound guided biopsy was performed on 2022 with pathology revealing invasive ductal carcinoma of the breast.     She saw Dr. Dhillon and underwent right mastectomy and sentinel node exam.  It showed IDC 8 mm, grade 2, ER strongly positive, NC negative, HER2/jayla 1+, Ki-67 12%.  There was also DCIS, multiple foci up to 8 mm, spanning a distance of 20 mm.  Nuclear grade 2. Two sentinel nodes removed, 1 had isolated tumor cells.    Oncotype DX RS 28; Chemo benefit >15%     She has declined adjuvant chemotherapy.     Started Anastrozole  22, switched to exemestane due to arthralgias.     Interim history:   She presents today for a 6 months follow-up.  She continues on her exemestane and is tolerating this fairly well.  At previous visit, she was concerned about a lump on the left side of her chin and ultrasound was done at that time and showed no abnormality.     No night sweats, has stable chronic joint pains.     Diffuse joint pains, no exacerbation with exemestane. Continues Tramadol. Gets epidurals with pain management every 6 months for her back pain    In the past was diagnosed with TONIE and B12 def. On B12, completed iron. Had a colonoscopy in Dec,(sessile polyps)  had EGD in  "February that showed gastritis. Cbc reviewed today and hgb normal.    Eating and drinking well.  Stool softeners keep stool soft  No new pain, but has diffuse stable pain  No new breast concerns  She recently had a right carotid endarterectomy for stenosis, recovered well from this  Taking vitamin D  Walking for exercise, but has not done much since the operation  Fatigue is her main complaint today, sleeping ok at night    Oncology History:  1. Breast, right, mastectomy:   - Invasive ductal carcinoma       - Greatest dimension:  8 mm       - Jonathan-Alaniz modified SBR score 3 + 2 + 1 = 6 of 9       - Histologic grade 2 of 3       - Mitotic index = 0.2 (average mitoses per high power field) (low)       - Resection margin free of invasive carcinoma           - Invasive carcinoma present 9 mm to closest posterior margin, 26 mm   to inferior margin, and 45 mm to superior margin   - Biopsy clip present   - No lymphovascular invasion is identified   - Ductal carcinoma in situ       - Foci up to 8 mm in greatest dimension spanning a distance of   approximately 20 mm       - Nuclear grade ranges from 2 to 3 of 3       - Solid and cribriform patterns       - Central comedonecrosis present:  Approximately 80%       - Resection margin free of DCIS           - DCIS present present 9 mm to closest posterior margin, 26 mm to   inferior margin, and 45 mm to superior margin   - Background breast contains usual ductal hyperplasia, apocrine metaplasia,   microcysts and macrocysts, ectatic ducts, and stromal fibrosis   - Microcalcifications, calcium phosphate type, associated with DCIS and   focally with medial calcific arterial stenosis (Monckeberg's sclerosis)   - See CAP Tumor Synoptic   2. [place] lymph node, right axillary, "#1 hot and blue count 2004,"   excision:   - 1 lymph node, isolated tumor cells present   3. [place] lymph node, right axillary, "#2 hot and blue count 1700,"   excision:   - 1 lymph node, negative for " metastatic carcinoma   CAP Tumor Synoptic:   Procedure:  Total mastectomy   Specimen laterality:  Right   Tumor site:  9 o'clock   Histologic type:  Invasive carcinoma of no special type (ductal)   Histologic grade (Shelly histologic score):  Holliday score 3 + 2 + 1 =   6 of 9   Glandular (acinar) / tubular differentiation:  Score 3   Nuclear pleomorphism:  Score 2   Mitotic grade:  Score 1   Overall grade:  Grade 2   Tumor size:  8 mm   Tumor focality:  Single focus of invasive carcinoma   Ductal carcinoma in situ (DCIS):  Present (Negative for extensive intraductal   component)       Size (extent) of DCIS:  Foci up to 8 mm in greatest dimension spanning a   distance of approximately 20 mm       Number blocks with DCIS:  4       Number blocks examined:  13       Architectural patterns:  Comedo, cribriform, solid       Nuclear grade:  Ranges from grade 2 (intermediate) to grade 3 (high)   Lobular carcinoma in situ (LCIS):  Not identified   Tumor extent:  Not applicable   Lymphovascular invasion:  Not identified   Dermal lymphovascular invasion:  Not identified   Microcalcifications:  Present in DCIS and nonneoplastic tissue   Treatment effect in the breast:  No known pre-surgical therapy   Treatment effect in the lymph nodes:  Not applicable   Margin status for invasive carcinoma:  All margins negative for invasive   carcinoma       Distance from invasive carcinoma closest posterior margin:  9 mm       Distance from invasive carcinoma to inferior margin:  26 mm       Distance from invasive carcinoma to superior margin:  45 mm   Margin status for DCIS:  All margins negative for DCIS       Distant from DCIS to closest posterior margin:  9 mm       Distance from DCIS to inferior margin:  26 mm       Distance from DCIS to superior margin:  45 mm   Regional lymph node status:  Tumor present in regional lymph nodes   Number of lymph nodes with macrometastases:  0   Number of lymph nodes with micrometastases:  0    Number of lymph nodes with isolated tumor cells:  1   Size of largest jose l metastatic deposit:  0.13 mm   Extranodal extension:  Not identified   Total number of lymph nodes examined (sentinel and nonsentinel):  2   Number of sentinel nodes examined:  2   Distant sites involved:  Not applicable   TNM descriptors:  Not applicable   pT category:  pT1b   Regional lymph nodes modifier:  (sn)   pN category:  (sn) pN0 (i+)   pM category:  Not applicable - pM cannot be determined from the submitted   specimens   Special studies:  Biomarkers, assessed by immunohistochemistry on prior right   breast biopsy ([facility] with following reported results:       - Estrogen Receptor (ER):  Positive (100%; strong intensity)       - Progesterone Receptor (AR):  Negative (<1%)       - HER2:  Negative (1+)       - Ki-67 proliferation index:  12% (low)       Oncology History   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, estrogen receptor positive   4/13/2022 Initial Diagnosis    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, estrogen receptor positive     4/13/2022 Cancer Staged    Staging form: Breast, AJCC 8th Edition  - Clinical stage from 4/13/2022: Stage IA (cT1b, cN0, cM0, G2, ER+, AR-, HER2-)     7/19/2022 - 7/19/2022 Chemotherapy    Treatment Summary   Plan Name: OP BREAST TC - DOCETAXEL CYCLOPHOSPHAMIDE Q3W  Treatment Goal: Curative  Status: Inactive  Start Date:   End Date:   Provider: Idania Martins MD  Chemotherapy: cyclophosphamide 600 mg/m2 = 880 mg in sodium chloride 0.9% 250 mL chemo infusion, 600 mg/m2, Intravenous, Clinic/HOD 1 time, 0 of 4 cycles  DOCEtaxeL (TAXOTERE) 75 mg/m2 = 110 mg in sodium chloride 0.9% 250 mL chemo infusion, 75 mg/m2, Intravenous, Clinic/HOD 1 time, 0 of 4 cycles         History:  Past Medical History:   Diagnosis Date    Allergy     Amblyopia     Anemia     Anticoagulant long-term use     Arthritis 02/02/1992    Carcinoma of upper-outer quadrant of right  breast in female, estrogen receptor positive 2022    Cataract     Depression     Dry eyes     Dry mouth     Duane's syndrome of right eye     Early dry stage nonexudative age-related macular degeneration of both eyes 2022    Fibromyalgia 2014    Fibromyalgia     Fractured hip     RIGHT HIP    GERD (gastroesophageal reflux disease)     History of psychiatric hospitalization     Hyperlipidemia 1992    Hypertension     Hypocalcemia     Hyponatremia     Kidney stone     Migraine headache     Osteoporosis     Pressure ulcer of unspecified site, unspecified stage     Psoriatic arthritis 1992    Recurrent upper respiratory infection (URI)     Right knee pain     post knee replacement surgery (possible rejectiion of metal)    RLS (restless legs syndrome)     Schizophrenia 1992    stable on meds    Sciatica     Squamous cell carcinoma of skin     Urinary tract infection       Past Surgical History:   Procedure Laterality Date    brow ptosis repair Right 2019    Surgeon: Dr. Karla Henson    CAROTID ENDARTERECTOMY Right 2025    Procedure: ENDARTERECTOMY-CAROTID;  Surgeon: SLIME Johnson III, MD;  Location: [facility] OR [address] [place] [address];  Service: Vascular;  Laterality: Right;    CATARACT EXTRACTION      CAUDAL EPIDURAL STEROID INJECTION N/A 2024    Procedure: Caudal SOPHY;  Surgeon: Maile Storm DO;  Location: [facility] PAIN MANAGEMENT;  Service: Pain Management;  Laterality: N/A;  ASA ok    CAUDAL EPIDURAL STEROID INJECTION N/A 2025    Procedure: Caudal SOPHY;  Surgeon: Maile Storm DO;  Location: [facility] PAIN MANAGEMENT;  Service: Pain Management;  Laterality: N/A;  asa ok     SECTION      COLONOSCOPY N/A 2016    Procedure: COLONOSCOPY;  Surgeon: ANDRIA Connelly MD;  Location: [facility] (4TH FLR);  Service: Endoscopy;  Laterality: N/A;    COLONOSCOPY N/A 2022    Procedure: COLONOSCOPY;  Surgeon: Mikey Walters MD;  Location: [facility] ENDO;  Service: Endoscopy;   Laterality: N/A;    cyst removed from right sinus  07/09/1982    EPIDURAL STEROID INJECTION INTO CERVICAL SPINE N/A 12/08/2023    Procedure: C6-7 OSPHY;  Surgeon: Spring Jensen MD;  Location: [facility] PAIN MANAGEMENT;  Service: Pain Management;  Laterality: N/A;  20 mins    EPIDURAL STEROID INJECTION INTO LUMBAR SPINE N/A 08/02/2023    Procedure: Injection-steroid-epidural-lumbar L5-S1;  Surgeon: Chirag Kim MD;  Location: [facility] PAIN MANAGEMENT;  Service: Pain Management;  Laterality: N/A;  asa    EPIDURAL STEROID INJECTION INTO LUMBAR SPINE N/A 5/22/2024    Procedure: L5/S1 Interlaminar SOPHY;  Surgeon: Maile Storm DO;  Location: [facility] PAIN MANAGEMENT;  Service: Pain Management;  Laterality: N/A;  20mins-ASA 5d    ESOPHAGOGASTRODUODENOSCOPY N/A 02/07/2023    Procedure: EGD (ESOPHAGOGASTRODUODENOSCOPY);  Surgeon: Dougie Shea MD;  Location: [facility];  Service: Endoscopy;  Laterality: N/A;    FOOT FRACTURE SURGERY      HYSTERECTOMY      VAGINAL HYSTERECTOMY WITHOUT BSO - ENDOMETRIOSIS    INJECTION FOR SENTINEL NODE IDENTIFICATION Right 05/09/2022    Procedure: INJECTION, FOR SENTINEL NODE IDENTIFICATION-Right;  Surgeon: NEAL Dhillon MD;  Location: [facility];  Service: General;  Laterality: Right;    INJECTION OF ANESTHETIC AGENT AROUND MEDIAL BRANCH NERVES INNERVATING LUMBAR FACET JOINT N/A 04/11/2019    Procedure: Lumbo-sacral Block, DR5 and Lateral Branches of S1,S2, S3;  Surgeon: Michelle Pineda Jr., MD;  Location: [facility] PAIN MGT;  Service: Pain Management;  Laterality: N/A;  Pt takes  and states she holds ASA on her own whenever she has procedures.  Instructed to hold x 3 days prior to procedure.      INJECTION OF ANESTHETIC AGENT AROUND MEDIAL BRANCH NERVES INNERVATING LUMBAR FACET JOINT Bilateral 05/03/2023    Procedure: Block-nerve-medial branch-lumbar bilateral L3, L4, L5;  Surgeon: Maile Storm DO;  Location: [facility] PAIN MGT;  Service: Pain Management;  Laterality: Bilateral;  asa     INJECTION OF ANESTHETIC AGENT INTO SACROILIAC JOINT Right 08/30/2018    Procedure: BLOCK, SACROILIAC JOINT-Right- ORAL SEDATION;  Surgeon: Michelle Pineda Jr., MD;  Location: [facility] PAIN MGT;  Service: Pain Management;  Laterality: Right;    INJECTION OF ANESTHETIC AGENT INTO SACROILIAC JOINT Bilateral 09/27/2018    Procedure: BLOCK, SACROILIAC JOINT-BILATERAL;  Surgeon: Michelle Pineda Jr., MD;  Location: [facility] PAIN MGT;  Service: Pain Management;  Laterality: Bilateral;  Please keep at 10:00 due to trasnsportation    INJECTION OF ANESTHETIC AGENT INTO SACROILIAC JOINT Bilateral 02/07/2019    Procedure: Bilateral Sacroiliac Joint Injection - Per Dr Pineda, not necessary to hold ASA.;  Surgeon: Michelle Pineda Jr., MD;  Location: [facility] PAIN MGT;  Service: Pain Management;  Laterality: Bilateral;    INJECTION, SPINE, LUMBOSACRAL, TRANSFORAMINAL APPROACH Right 7/17/2024    Procedure: Right  L3/4  and L4/5 TFESI;  Surgeon: Maile Storm DO;  Location: [facility] PAIN MANAGEMENT;  Service: Pain Management;  Laterality: Right;  20 mins  ASA 5 days    INTRAOCULAR PROSTHESES INSERTION Right 08/01/2019    Procedure: INSERTION, IOL PROSTHESIS;  Surgeon: Karen Song MD;  Location: [address];  Service: Ophthalmology;  Laterality: Right;    INTRAOCULAR PROSTHESES INSERTION Left 09/26/2019    Procedure: INSERTION, IOL PROSTHESIS;  Surgeon: Karen Song MD;  Location: [address];  Service: Ophthalmology;  Laterality: Left;    JOINT REPLACEMENT Right     knee    KNEE SURGERY      MASTECTOMY Right 05/09/2022    Procedure: MASTECTOMY-Right;  Surgeon: NEAL Dhillon MD;  Location: [facility];  Service: General;  Laterality: Right;  2.5 HOURS    PHACOEMULSIFICATION OF CATARACT Right 08/01/2019    Procedure: PHACOEMULSIFICATION, CATARACT;  Surgeon: Karen Song MD;  Location: [address];  Service: Ophthalmology;  Laterality: Right;    PHACOEMULSIFICATION OF CATARACT Left 09/26/2019    Procedure:  PHACOEMULSIFICATION, CATARACT;  Surgeon: Karen Song MD;  Location: [facility] OR [address] [place] [address];  Service: Ophthalmology;  Laterality: Left;    RADIOFREQUENCY ABLATION, NERVE, PERIPHERAL Right 04/24/2024    Procedure: RIGHT L5 Dorsal Ramus and RIGHT Lateral Branches of S1, S2, and S3;  Surgeon: Maile Storm DO;  Location: [facility] PAIN MANAGEMENT;  Service: Pain Management;  Laterality: Right;  30 mins    RADIOFREQUENCY THERMOCOAGULATION Right 05/07/2019    Procedure: RADIOFREQUENCY THERMAL COAGULATION RIGHT DORSAL RAMUS 5 AND LATERAL BRANCH OF S1, S2 AND S3;  Surgeon: Michelle Pineda Jr., MD;  Location: [facility] PAIN T;  Service: Pain Management;  Laterality: Right;    RADIOFREQUENCY THERMOCOAGULATION Left 05/14/2019    Procedure: RADIOFREQUENCY THERMAL COAGULATION LEFT DORSAL RAMUS 5 AND LATERAL BRANCH OF S1,S2 AND S3;  Surgeon: Michelle Pineda Jr., MD;  Location: [facility] PAIN T;  Service: Pain Management;  Laterality: Left;    RADIOFREQUENCY THERMOCOAGULATION Right 10/22/2019    Procedure: RADIOFREQUENCY THERMAL COAGULATION---DARSAL RAMUS 5 and LATERAL S1,S2,and S3 Right;  Surgeon: Michelle Pineda Jr., MD;  Location: [facility] PAIN MGT;  Service: Pain Management;  Laterality: Right;  patient to sign consent DOS    RADIOFREQUENCY THERMOCOAGULATION Left 10/29/2019    Procedure: RADIOFREQUENCY THERMAL COAGULATION - LEFT - DR5, S1,S2, AND S3;  Surgeon: Michelle Pineda Jr., MD;  Location: [facility] PAIN MGT;  Service: Pain Management;  Laterality: Left;    RADIOFREQUENCY THERMOCOAGULATION Left 05/26/2020    Procedure: RADIOFREQUENCY THERMAL COAGULATION--Left DR5+ lateral branches of S1, S2, S3;  Surgeon: Michelle Pineda Jr., MD;  Location: [facility] PAIN MGT;  Service: Pain Management;  Laterality: Left;    RADIOFREQUENCY THERMOCOAGULATION Right 06/02/2020    Procedure: RADIOFREQUENCY THERMAL COAGULATION--Right DR5+ lateral branches of S1, S2, S3;  Surgeon: Michelle Pineda Jr., MD;  Location: [facility] PAIN MGT;  Service: Pain  Management;  Laterality: Right;    SENTINEL LYMPH NODE BIOPSY Right 2022    Procedure: BIOPSY, LYMPH NODE, SENTINEL-Right;  Surgeon: NEAL Dhillon MD;  Location: [facility];  Service: General;  Laterality: Right;    SINUS SURGERY      Surgery on right knee  1982    TONSILLECTOMY      tumor removed from back left side upper shoulder  2006     Family History   Problem Relation Name Age of Onset    Asthma Mother      Colon cancer Mother      Psoriasis Mother      Cancer Mother          colon    Depression Mother      Cancer Father          lymphoma    Stroke Sister      Diabetes Brother x2     Arthritis Brother x2     Heart disease Brother x2         cad    No Known Problems Daughter      Hypertension Neg Hx      Suicide Neg Hx      Amblyopia Neg Hx      Blindness Neg Hx      Cataracts Neg Hx      Glaucoma Neg Hx      Macular degeneration Neg Hx      Retinal detachment Neg Hx      Strabismus Neg Hx      Eczema Neg Hx      Allergies Neg Hx        Social History     Tobacco Use    Smoking status: Every Day     Current packs/day: 0.00     Average packs/day: 0.3 packs/day for 10.0 years (2.5 ttl pk-yrs)     Types: Cigarettes     Start date: 1/10/2013     Last attempt to quit: 1/10/2023     Years since quittin.2     Passive exposure: Never    Smokeless tobacco: Former    Tobacco comments:     about 10 cigs a day 1/15/25   Substance and Sexual Activity    Alcohol use: No    Drug use: No    Sexual activity: Not Currently     Partners: Male     Birth control/protection: Post-menopausal        ROS:   Review of Systems   Constitutional:  Positive for malaise/fatigue. Negative for fever and weight loss.   HENT:  Negative for congestion, ear pain, nosebleeds and sore throat.    Eyes:  Negative for blurred vision, double vision and photophobia.   Respiratory:  Negative for cough, hemoptysis, sputum production, shortness of breath, wheezing and stridor.    Cardiovascular:  Negative for chest pain, palpitations,  "orthopnea and leg swelling.   Gastrointestinal:  Negative for abdominal pain, blood in stool, constipation, diarrhea, heartburn, melena, nausea and vomiting.   Genitourinary:  Negative for dysuria and hematuria.   Musculoskeletal:  Positive for back pain and joint pain (left hip with recent exacerbation). Negative for myalgias and neck pain.   Skin:  Negative for itching and rash.   Neurological:  Negative for dizziness, focal weakness, seizures, weakness and headaches.   Endo/Heme/Allergies:  Negative for polydipsia. Does not bruise/bleed easily.   Psychiatric/Behavioral:  Positive for depression. Negative for memory loss. The patient is nervous/anxious. The patient does not have insomnia.         Objective:     Vitals:    04/10/25 1330   BP: (!) 152/66   Pulse: 78   Temp: 97.9 °F (36.6 °C)   TempSrc: Oral   SpO2: 96%   Weight: 45.7 kg (100 lb 12 oz)   Height: 5' 1" (1.549 m)   PainSc:   8   PainLoc: Back       Physical Examination:   Physical Exam  Vitals and nursing note reviewed.   Constitutional:       General: She is not in acute distress.     Appearance: She is not diaphoretic.   HENT:      Head: Normocephalic.   Eyes:      Conjunctiva/sclera: Conjunctivae normal.   Neck:      Thyroid: No thyromegaly.   Cardiovascular:      Rate and Rhythm: Normal rate.   Pulmonary:      Effort: Pulmonary effort is normal.   Chest:      Comments: Hx of right mastectomy, well healed, No skin changes or breast masses noted to left breast, no  lymphadenopathy noted bilaterally    Abdominal:      General: Bowel sounds are normal.      Palpations: Abdomen is soft.   Musculoskeletal:         General: Normal range of motion.      Cervical back: Neck supple.   Skin:     General: Skin is warm and dry.   Neurological:      Mental Status: She is alert and oriented to person, place, and time.      Gait: Gait is intact.   Psychiatric:         Mood and Affect: Affect normal.         Cognition and Memory: Memory normal.         Judgment: " Judgment normal.          Diagnostic Tests:  Significant Imaging: I have reviewed and interpreted all pertinent imaging results/findings.    Laboratory Data:  All pertinent labs have been reviewed.    Labs:   Lab Results   Component Value Date    WBC 6.21 04/10/2025    HGB 12.0 04/10/2025    HCT 37.2 04/10/2025    MCV 96 04/10/2025     04/10/2025       Assessment/Plan:   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, estrogen receptor positive  pT1b N0 (i+)  grade 2 ER + CO - HER2 - IDC of the right breast.    She is status post mastectomy and sentinel node exam for IDC as well as DCIS of the right breast.     Oncotype returned high risk, which confers a benefit ot the addition of chemotherapy to AI.   RSclin showed an 8% risk of recurrence with 4% absolute benefit of chemotherapy. She has declined chemotherapy.     Tolerating exemestane relatively well. Continue at this time.  Cont till Aug 2027    Continue Zometa q 6 months for bone health and possible risk reduction for breast cancer,cleared to receive today.   Continue active surveillance. Next mammogram in June 2025, will see her back in 6 months, sooner if needed    Osteopenia of multiple sites  Diagnosed with osteopenia/osteoporosis given hip fracture in 2020. Follows up with Dr. Cornelius and is on zoledronic acid, now receiving q 6 months.   Continues on MTX weekly  Scheduled for zometa on 5/8/25, labs reviewed today and acceptable  Last bone density done 3/2024     Hip pain   Has degenerative changes L3-L5. Declines surgical evaluation at this time. Getting OSPHY with pain management tomorrow    Aromatase inhibitor arthralgias   Continue supportive care. She declined acupuncture referral previously.     Colon Polyps  TONIE  Recent colonoscopy 12/22, path did not show any malignancy.   EGD completed 2/23  Has TONIE again, poor tolerability and response to oral iron. received 2 doses of IV venofer at [facility] in December 2023 with improvement in  counts  Cbc and iron studies being monitored by pcp    Weight loss  Stabilized from 6 months ago  Continue to monitor    Atrial fibrillation   Atherosclerosis of aorta   CAD  On aspirin and lipitor, follows up with Cardiology  Continue to follow with pcp  Continue to follow with Dr. Jonas in vascular    Fatigue  Labs stable  Will check tsh  Exemestane could be contributing    ECOG SCORE    1 - Restricted in strenuous activity-ambulatory and able to carry out work of a light nature       Total time of this visit, including time spent face to face with patient and/or via video/audio, and also in preparing for today's visit for MDM and documentation. (Medical Decision Making, including consideration of possible diagnoses, management options, complex medical record review, review of diagnostic tests and information, consideration and discussion of significant complications based on comorbidities, and discussion with providers involved with the care of the patient) is 30 minutes. Greater than 50% was spent face to face with the patient counseling and coordinating care.     code applied: patient requires or will require a continuous, longitudinal, and active collaborative plan of care related to this patient's health condition, breast cancer -the management of which requires the direction of a practitioner with specialized clinical knowledge, skill, and expertise.       Cheyanne Smith NP    Discussion:         Med Onc Chart Routing      Follow up with physician    Follow up with LATOYA 6 months.   Infusion scheduling note   zometa every 6 months   Injection scheduling note    Labs CBC and CMP   Scheduling:  Preferred lab:  Lab interval:  in 6 months   Imaging Mammogram   in June   Pharmacy appointment    Other referrals

## 2025-04-10 NOTE — PROCEDURES
"Returns for routine foot care, painful fungal nails worst at right 1st toenail medial border which is also mildly red; no new concerns    Routine Foot Care    Date/Time: 4/10/2025 3:30 PM    Performed by: Walt Zhang DPM  Authorized by: Walt Zhang DPM    Time out: Immediately prior to procedure a "time out" was called to verify the correct patient, procedure, equipment, support staff and site/side marked as required.    Consent Done?:  Yes (Verbal)  Hyperkeratotic Skin Lesions?: No      Nail Care Type:  Debride(Left 1st Toe, Right 1st Toe and Right 2nd Toe)  Patient tolerance:  Patient tolerated the procedure well with no immediate complications      Return for routine foot care in 3 months  "

## 2025-04-11 ENCOUNTER — TELEPHONE (OUTPATIENT)
Dept: ALLERGY | Facility: CLINIC | Age: 70
End: 2025-04-11
Payer: MEDICARE

## 2025-04-11 ENCOUNTER — RESULTS FOLLOW-UP (OUTPATIENT)
Dept: HEMATOLOGY/ONCOLOGY | Facility: CLINIC | Age: 70
End: 2025-04-11

## 2025-04-11 NOTE — TELEPHONE ENCOUNTER
----- Message from Blake sent at 4/11/2025  1:00 PM CDT -----  Type:  Patient Returning CallWho Called:ptWho Left Message for Patient:Does the patient know what this is regarding?:apptWould the patient rather a call back or a response via [facility]? callBest Call Back Number: [phone] Information: pt states she is unable to make appt on 7/30 due to transportation. Pt would like to see if provider has availability on 8/1. Thank you

## 2025-04-11 NOTE — TELEPHONE ENCOUNTER
Rt call to pt in regards to below message. Pt verbalized she will keep the appointment in hopes of having a ride on the date of the appointment.    pt states she is unable to make appt on 7/30 due to transportation. Pt would like to see if provider has availability on 8/1.

## 2025-04-15 DIAGNOSIS — I65.21 CAROTID ARTERY STENOSIS, ASYMPTOMATIC, RIGHT: ICD-10-CM

## 2025-04-15 DIAGNOSIS — Z17.0 CARCINOMA OF UPPER-OUTER QUADRANT OF RIGHT BREAST IN FEMALE, ESTROGEN RECEPTOR POSITIVE: ICD-10-CM

## 2025-04-15 DIAGNOSIS — C50.411 CARCINOMA OF UPPER-OUTER QUADRANT OF RIGHT BREAST IN FEMALE, ESTROGEN RECEPTOR POSITIVE: ICD-10-CM

## 2025-04-15 RX ORDER — PREGABALIN 25 MG/1
CAPSULE ORAL
Qty: 180 CAPSULE | Refills: 1 | Status: SHIPPED | OUTPATIENT
Start: 2025-04-15

## 2025-04-15 RX ORDER — TRAMADOL HYDROCHLORIDE 50 MG/1
50 TABLET ORAL EVERY 8 HOURS PRN
Qty: 90 EACH | Refills: 0 | Status: SHIPPED | OUTPATIENT
Start: 2025-04-15

## 2025-04-15 NOTE — TELEPHONE ENCOUNTER
No care due was identified.  [facility] Embedded Care Due Messages. Reference number: [id number].   4/15/2025 10:56:09 AM CDT

## 2025-04-16 ENCOUNTER — PATIENT MESSAGE (OUTPATIENT)
Dept: INTERNAL MEDICINE | Facility: CLINIC | Age: 70
End: 2025-04-16
Payer: MEDICARE

## 2025-04-16 ENCOUNTER — PATIENT MESSAGE (OUTPATIENT)
Dept: ORTHOPEDICS | Facility: CLINIC | Age: 70
End: 2025-04-16
Payer: MEDICARE

## 2025-04-17 NOTE — TELEPHONE ENCOUNTER
LOV with Sadaf Vargas MD , 1/16/2025    Attempted to reach pt to assist with scheduling visit and obtain more information; received voicemail

## 2025-04-20 ENCOUNTER — PATIENT MESSAGE (OUTPATIENT)
Dept: PAIN MEDICINE | Facility: CLINIC | Age: 70
End: 2025-04-20
Payer: MEDICARE

## 2025-04-21 DIAGNOSIS — M54.16 LUMBAR RADICULOPATHY: ICD-10-CM

## 2025-04-21 DIAGNOSIS — M54.12 CERVICAL RADICULOPATHY: ICD-10-CM

## 2025-04-21 DIAGNOSIS — M48.062 SPINAL STENOSIS OF LUMBAR REGION WITH NEUROGENIC CLAUDICATION: ICD-10-CM

## 2025-04-21 DIAGNOSIS — M54.17 LUMBOSACRAL RADICULOPATHY: Primary | ICD-10-CM

## 2025-04-21 RX ORDER — PREGABALIN 50 MG/1
50 CAPSULE ORAL 2 TIMES DAILY
Qty: 60 CAPSULE | Refills: 5 | Status: SHIPPED | OUTPATIENT
Start: 2025-04-21 | End: 2025-10-18

## 2025-04-24 ENCOUNTER — TELEPHONE (OUTPATIENT)
Dept: ALLERGY | Facility: CLINIC | Age: 70
End: 2025-04-24

## 2025-04-24 NOTE — TELEPHONE ENCOUNTER
Dr. Benítez    Yes, You will need to enter a therapy plan for [facility] ambulatory infusions

## 2025-04-24 NOTE — TELEPHONE ENCOUNTER
----- Message from Jeff Benítez MD sent at 4/23/2025  5:08 PM CDT -----  Regarding: RE: hizentra  Okay thanks. I would need to enter a new therapy plan for this, correct?Kenzie Norris/Immunology  ----- Message -----  From: Mireya Otto  Sent: 4/22/2025   4:33 PM CDT  To: Elizabeth A Bosworth, LPN; Jeff Benítez, #  Subject: RE: hizentra                                     Yes, she wants to see if IVIG will be cheaper if infused in a hospital/provider based infusion center compared to the subQ IG here at our infusion suite.Thanks,Mireya  ----- Message -----  From: Jeff Benítez MD  Sent: 4/22/2025   2:10 PM CDT  To: Elizabeth A Bosworth, LPN; Polina Watt RN#  Subject: RE: hizentra                                     Just to be clear, she is no longer interested in subQ Ig? She would like to do IV?Kenzie Norris/Immunology  ----- Message -----  From: Mireya Otto  Sent: 4/22/2025  10:43 AM CDT  To: Elizabeth A Bosworth, LPN; Jeff Benítez, #  Subject: RE: hizentra                                     Good morning, I spoke with Ms. Pierre this morning and she is interested in obtaining pricing for IVIG at [facility] infusion suites. Her patient responsibility would be approximately $120.00 per infusion. She prefers [facility] as it is closer to her home. Please change the treatment dept to [facility] to provide services. I will make her a no go on my end to not interfere with them submitting/obtaining authorization for services. Thanks, Mireya  ----- Message -----  From: Jeff Benítez MD  Sent: 4/3/2025  12:56 PM CDT  To: Elizabeth A Bosworth, LPN; Viviana Macias,#  Subject: RE: kaiden                                     Thanks so much.Jeff Benítez, MDAllergy/Immunology  ----- Message -----  From: Polina Watt RN  Sent: 4/1/2025   8:53 AM CDT  To: Elizabeth A Bosworth, LPN; Viviana Macias,#  Subject: RE: kaiden Dennis,  Yes,  these SQIG referrals are sent via the electronic prescription to [facility]. Once we receive authorization, we bring the patient in to the suite and our nurses teach self infusion, then we continue to dispense to the home as ordered. We are working on this now! Rodolfo Betancourt  ----- Message -----  From: Bosworth, Elizabeth A, LPN  Sent: 3/31/2025   4:45 PM CDT  To: Jeff Benítez MD; Polina Watt RN; Carmen  Subject: FW: hizentra                                     If the rx is going to [facility], it does not need a therapy plan for SQIG.   Dr Benítez,  I added the [facility] infusion team on this message because the other team was [facility] infusions.  ----- Message -----  From: Jeff Benítez MD  Sent: 3/31/2025  10:42 AM CDT  To: Elizabeth A Bosworth, LPN; Viviana Macias,#  Subject: RE: kaiden                                     Normally what I do when I write for subQ Ig is I write as an electronic prescription to the [facility]. This order still appears to be active from when I ordered it in September. But your saying it should be ordered through the therapy plan?Jeff Benítez, MDAllergy/Immunology  ----- Message -----  From: Charlotte Orozco  Sent: 3/28/2025   3:29 PM CDT  To: Elizabeth A Bosworth, LPN; Viviana Macias,#  Subject: RE: hizentra                                     We can service the patient. Please change the treatment dept to [facility] so we can work on this referral.Charlotte Betancourt  ----- Message -----  From: Jeff Benítez MD  Sent: 3/28/2025   1:32 PM CDT  To: Elizabeth A Bosworth, LPN; Vivinaa Macias,#  Subject: RE: hizentra                                     I am sorry. Maybe I sent this to the wrong department. She wants to do home infusions. I'm trying to figure out if I need to put a new order in for home-infusions of hizentra.Jeff Benítez, [facility]  -----  Message -----  From: Viviana Macias RN  Sent: 3/28/2025  11:06 AM CDT  To: Elizabeth A Bosworth, LPN; Jeff Benítez, #  Subject: RE: hizentra                                     It would take us at least 3-4 months to get set up for this. We don't have the equipment here and would need to be inserviced, et cetera. Not an impossible task, but we need to have all of the equipment and education needed for this. I am assuming that this medication is formulary?MONICA Thompson, RN, [facility] [phone]  ----- Message -----  From: Geraldine Pro RN  Sent: 3/28/2025  10:54 AM CDT  To: Elizabeth A Bosworth, LPN; Viviana Macias,#  Subject: RE: hizentra                                     Just adding ambulatory infusion Supervisor.  I do not see a therapy plan for that, and we don't currently do SC infusions here at Ambulatory Infusion. They require equipment we don't have. I'll let Viviana take it away. Lol  ----- Message -----  From: Jeff Benítez MD  Sent: 3/28/2025  10:35 AM CDT  To: Elizabeth A Bosworth, LPN; Geraldine Pro, R#  Subject: hizentra                                         Hello,This patient has decided to start the hizentra home infusions. I prescribed it back in September, but she hadn't started it yet due to cost (but they say they will now try to make it work). It looks like my old order for the hizentra is still active. Do I need to put a new order in? Or no?Jeff Benítez, ROCKllergy/Immunology

## 2025-04-29 ENCOUNTER — TELEPHONE (OUTPATIENT)
Dept: HEMATOLOGY/ONCOLOGY | Facility: CLINIC | Age: 70
End: 2025-04-29
Payer: MEDICARE

## 2025-04-29 ENCOUNTER — TELEPHONE (OUTPATIENT)
Dept: PAIN MEDICINE | Facility: CLINIC | Age: 70
End: 2025-04-29
Payer: MEDICARE

## 2025-04-29 ENCOUNTER — TELEPHONE (OUTPATIENT)
Dept: INFECTIOUS DISEASES | Facility: HOSPITAL | Age: 70
End: 2025-04-29
Payer: MEDICARE

## 2025-04-29 DIAGNOSIS — Z17.0 CARCINOMA OF UPPER-OUTER QUADRANT OF RIGHT BREAST IN FEMALE, ESTROGEN RECEPTOR POSITIVE: Primary | ICD-10-CM

## 2025-04-29 DIAGNOSIS — C50.411 CARCINOMA OF UPPER-OUTER QUADRANT OF RIGHT BREAST IN FEMALE, ESTROGEN RECEPTOR POSITIVE: Primary | ICD-10-CM

## 2025-04-29 NOTE — TELEPHONE ENCOUNTER
Pt called asking about scheduling an infusion.  The only therapy plan currently in place is Zometa, and she said it was another medication.  I let the pt know there are no other therapy plans listed, and I will let the MD know she is asking.      I sent an inbasket msg to the provider letting her know pt is asking about this medication.

## 2025-04-29 NOTE — TELEPHONE ENCOUNTER
Orders faxed    ----- Message from ELAINE Perez sent at 4/11/2025  5:10 PM CDT -----  Regarding: FW: Consult Advisory  Contact: PHUONG HAZEL [[id number]]    ----- Message -----  From: Alice Platt  Sent: 4/11/2025   4:44 PM CDT  To: Luis CAMPOS Staff  Subject: Consult Advisory                                 CONSULT/ADVISORYName of Caller: PHUONG HAZEL [[id number]]Contact Preference:   [phone] of Call:  Pt is calling because she need a prescription for her prosthesis and six bras  Please fax to Heidi at .  Thank  you.

## 2025-04-29 NOTE — TELEPHONE ENCOUNTER
Returned patients call, pt unavailable, left vn to contact pain management office at [phone].

## 2025-04-29 NOTE — TELEPHONE ENCOUNTER
Ms. Landry returned my call, she stated that she was in excrutiating pain from her buttocks down to her grown.  I have advised Ms. Landry to seek medical attention either to the Emergency room or to urgent care.  Pt thanked me and hung up

## 2025-05-01 ENCOUNTER — TELEPHONE (OUTPATIENT)
Dept: INFECTIOUS DISEASES | Facility: HOSPITAL | Age: 70
End: 2025-05-01
Payer: MEDICARE

## 2025-05-01 RX ORDER — ACETAMINOPHEN 500 MG
500 TABLET ORAL
OUTPATIENT
Start: 2025-05-01

## 2025-05-01 RX ORDER — HEPARIN 100 UNIT/ML
500 SYRINGE INTRAVENOUS
OUTPATIENT
Start: 2025-05-01

## 2025-05-01 RX ORDER — DIPHENHYDRAMINE HYDROCHLORIDE 50 MG/ML
25 INJECTION, SOLUTION INTRAMUSCULAR; INTRAVENOUS
OUTPATIENT
Start: 2025-05-01

## 2025-05-01 RX ORDER — DIPHENHYDRAMINE HCL 25 MG
25 CAPSULE ORAL
OUTPATIENT
Start: 2025-05-01

## 2025-05-01 RX ORDER — SODIUM CHLORIDE 0.9 % (FLUSH) 0.9 %
10 SYRINGE (ML) INJECTION
OUTPATIENT
Start: 2025-05-01

## 2025-05-01 RX ORDER — EPINEPHRINE 0.3 MG/.3ML
0.3 INJECTION SUBCUTANEOUS ONCE AS NEEDED
OUTPATIENT
Start: 2025-05-01

## 2025-05-01 NOTE — TELEPHONE ENCOUNTER
Pt's daughter called for an update on which medication the pt would be receiving and where. After a thorough conversation and all available information from her care team,  we will now await the pricing estimate for the Privigen, and pt and daughter will make decision on whether they want the IVIG or Hizentra.  Pt's daughter agrees, and I let her know I would update her as soon as we have all of the information.  Daughter has number to give us a call back Monday in case she does not here from me.

## 2025-05-06 ENCOUNTER — TELEPHONE (OUTPATIENT)
Dept: INTERNAL MEDICINE | Facility: CLINIC | Age: 70
End: 2025-05-06
Payer: MEDICARE

## 2025-05-06 ENCOUNTER — TELEPHONE (OUTPATIENT)
Dept: ALLERGY | Facility: CLINIC | Age: 70
End: 2025-05-06
Payer: MEDICARE

## 2025-05-06 ENCOUNTER — OFFICE VISIT (OUTPATIENT)
Dept: URGENT CARE | Facility: CLINIC | Age: 70
End: 2025-05-06
Payer: MEDICARE

## 2025-05-06 VITALS
WEIGHT: 102 LBS | RESPIRATION RATE: 17 BRPM | HEART RATE: 78 BPM | DIASTOLIC BLOOD PRESSURE: 73 MMHG | SYSTOLIC BLOOD PRESSURE: 145 MMHG | TEMPERATURE: 98 F | OXYGEN SATURATION: 97 % | BODY MASS INDEX: 19.26 KG/M2 | HEIGHT: 61 IN

## 2025-05-06 DIAGNOSIS — L08.9 ABRASION OF FINGER WITH INFECTION, INITIAL ENCOUNTER: Primary | ICD-10-CM

## 2025-05-06 DIAGNOSIS — S60.419A ABRASION OF FINGER WITH INFECTION, INITIAL ENCOUNTER: Primary | ICD-10-CM

## 2025-05-06 DIAGNOSIS — Z12.31 ENCOUNTER FOR SCREENING MAMMOGRAM FOR BREAST CANCER: Primary | ICD-10-CM

## 2025-05-06 PROCEDURE — 99213 OFFICE O/P EST LOW 20 MIN: CPT | Mod: S$GLB,,, | Performed by: NURSE PRACTITIONER

## 2025-05-06 RX ORDER — CEPHALEXIN 500 MG/1
500 CAPSULE ORAL EVERY 6 HOURS
Qty: 28 CAPSULE | Refills: 0 | Status: SHIPPED | OUTPATIENT
Start: 2025-05-06 | End: 2025-05-13

## 2025-05-06 RX ORDER — MUPIROCIN 20 MG/G
OINTMENT TOPICAL 3 TIMES DAILY
Qty: 30 G | Refills: 0 | Status: SHIPPED | OUTPATIENT
Start: 2025-05-06 | End: 2025-05-13

## 2025-05-06 NOTE — TELEPHONE ENCOUNTER
I reached out to Ms. Landry's daughter, Vidhi, to inform her that the Privigen infusion is approved and gave her the estimated patient responsibility from the central pricing office.    Vidhi expressed understanding and states she will reach out to Geraldine Pro in the infusion center.    Aguilar Hernandez, PharmD  Clinical Pharmacist - Allergy/Pulmonary

## 2025-05-06 NOTE — PROGRESS NOTES
"Subjective:      Patient ID: Bhavna Landry is a 69 y.o. female.    Vitals:  height is 5' 1" (1.549 m) and weight is 46.3 kg (102 lb). Her oral temperature is 98.3 °F (36.8 °C). Her blood pressure is 145/73 (abnormal) and her pulse is 78. Her respiration is 17 and oxygen saturation is 97%.     Chief Complaint: Hand Pain    This is a 69 y.o. female who presents today with a chief complaint of right finger swollen, redness,sx started two week ago. She believes she scraped her finger against something initially. She has been cleaning it with antibacterial soap and applying neosporin.     Hand Pain   Her dominant hand is their right hand. The incident occurred at home. There was no injury mechanism. The pain has been Constant since the incident. Pertinent negatives include no chest pain, muscle weakness, numbness or tingling.       Cardiovascular:  Negative for chest pain.   Skin:  Positive for wound. Negative for erythema.   Neurological:  Negative for numbness.      Objective:     Physical Exam   Constitutional: She is oriented to person, place, and time. She appears well-developed.   HENT:   Head: Normocephalic and atraumatic. Head is without abrasion, without contusion and without laceration.   Ears:   Right Ear: External ear normal.   Left Ear: External ear normal.   Nose: Nose normal.   Mouth/Throat: Oropharynx is clear and moist and mucous membranes are normal.   Eyes: Conjunctivae, EOM and lids are normal. Pupils are equal, round, and reactive to light.   Neck: Trachea normal and phonation normal. Neck supple.   Cardiovascular: Normal rate, regular rhythm and normal heart sounds.   Pulmonary/Chest: Effort normal and breath sounds normal. No stridor. No respiratory distress.   Musculoskeletal: Normal range of motion.         General: Normal range of motion.      Left hand: Left middle finger: Exhibits swelling and tenderness.   Neurological: She is alert and oriented to person, place, and time.   Skin: Skin is " warm, dry, intact and no rash. Capillary refill takes less than 2 seconds. lesion No abrasion, No burn, No bruising, No erythema and No ecchymosis   Psychiatric: Her speech is normal and behavior is normal. Judgment and thought content normal.   Nursing note and vitals reviewed.      Assessment:     1. Abrasion of finger with infection, initial encounter        Plan:       Abrasion of finger with infection, initial encounter  -     mupirocin (BACTROBAN) 2 % ointment; Apply topically 3 (three) times daily. for 7 days  Dispense: 30 g; Refill: 0  -     cephALEXin (KEFLEX) 500 MG capsule; Take 1 capsule (500 mg total) by mouth every 6 (six) hours. for 7 days  Dispense: 28 capsule; Refill: 0        Cleaned with alcohol wipe and applied mupirocin. Covered with gauze and wrapped with coflex. Instructed to leave in place until tomorrow. Start antibiotics tonight. Return for worsening symptoms    Patient Instructions   Keflex four times a day for 7 days    Please keep your wound covered as it heals. Use a thin layer of antibiotic ointment (mupirocin) to help keep the wound moist. This will also keep the dressing from sticking to the wound.  After 24 hours injury, you can gently wash the wound with soap and water. Pat dry and put on a clean dressing. A telfa pad will not stick to the wound.  Change your dressing once a day or every other day.  Always wash your hands before and after touching the wound.  Each time you change the dressing, look closely at the wound to be sure it is healing the right way. The wound may have a yellowish discharge, and this is normal.  Avoid picking the scab or scratching the site which may cause more irritation.  Do not soak in water or swim with an open wound.  Do not use hydrogen peroxide; it will cause the wound to dry out or delay wound healing/new skin growth.  Please complete full course of oral antibiotics.       If not allergic, take Tylenol (Acetaminophen) 650 mg to  1 g every 6 hours as  needed for fever/pain and/or Motrin (Ibuprofen) 600 to 800 mg every 6 hours as needed for pain and/or fever      Please remember that you have received care at an urgent care today. Urgent cares are not emergency rooms and are not equipped to handle life threatening emergencies and cannot rule in or out certain medical conditions and you may be released before all of your medical problems are known or treated. Please arrange follow up with your primary care physician or speciality clinic  within 2-5 days if your signs and symptoms have not resolved or worsen. Patient can call our Referral Hotline at [phone] to make an appointment.    Please return here or go to the Emergency Department for any concerns or worsening of condition.Patient was educated on signs/symptoms that would warrant emergent medical attention. Patient verbalized understanding.  Signs of infection. These include a fever of 100.4°F (38°C) or higher, chills, or wound that will not heal.  The pain in and around the area gets much worse.  There is a bad smell or pus (thick yellow, green, or gray fluid) coming from your wound.  You notice a crunchy feeling or blisters in the skin around the wound.  The redness around your wound gets bigger or is spreading up your arm or leg.  Fluid that is not pus drains from your wound.  Your swelling doesnt improve or gets worse.

## 2025-05-06 NOTE — TELEPHONE ENCOUNTER
----- Message from Anu sent at 5/6/2025 12:00 PM CDT -----  Type:  Needs Medical AdviceWho Called: ptWould the patient rather a call back or a response via [facility]? [facility] Call Back Number:  [phone] Information: pt needing orders for mammogram

## 2025-05-06 NOTE — TELEPHONE ENCOUNTER
Called patient to let her know that her consents for infusion therapy was filled out by Dr. Benítez and was just waiting for her signature so the Pharmacist could continue working on it. Patient informed to come anytime before 4pm except 12-1 when the clinic goes to lunch. Forms placed with [facility] MA.

## 2025-05-06 NOTE — PATIENT INSTRUCTIONS
Keflex four times a day for 7 days    Please keep your wound covered as it heals. Use a thin layer of antibiotic ointment (mupirocin) to help keep the wound moist. This will also keep the dressing from sticking to the wound.  After 24 hours injury, you can gently wash the wound with soap and water. Pat dry and put on a clean dressing. A telfa pad will not stick to the wound.  Change your dressing once a day or every other day.  Always wash your hands before and after touching the wound.  Each time you change the dressing, look closely at the wound to be sure it is healing the right way. The wound may have a yellowish discharge, and this is normal.  Avoid picking the scab or scratching the site which may cause more irritation.  Do not soak in water or swim with an open wound.  Do not use hydrogen peroxide; it will cause the wound to dry out or delay wound healing/new skin growth.  Please complete full course of oral antibiotics.       If not allergic, take Tylenol (Acetaminophen) 650 mg to  1 g every 6 hours as needed for fever/pain and/or Motrin (Ibuprofen) 600 to 800 mg every 6 hours as needed for pain and/or fever      Please remember that you have received care at an urgent care today. Urgent cares are not emergency rooms and are not equipped to handle life threatening emergencies and cannot rule in or out certain medical conditions and you may be released before all of your medical problems are known or treated. Please arrange follow up with your primary care physician or speciality clinic  within 2-5 days if your signs and symptoms have not resolved or worsen. Patient can call our Referral Hotline at [phone] to make an appointment.    Please return here or go to the Emergency Department for any concerns or worsening of condition.Patient was educated on signs/symptoms that would warrant emergent medical attention. Patient verbalized understanding.  Signs of infection. These include a fever of 100.4°F  (38°C) or higher, chills, or wound that will not heal.  The pain in and around the area gets much worse.  There is a bad smell or pus (thick yellow, green, or gray fluid) coming from your wound.  You notice a crunchy feeling or blisters in the skin around the wound.  The redness around your wound gets bigger or is spreading up your arm or leg.  Fluid that is not pus drains from your wound.  Your swelling doesnt improve or gets worse.

## 2025-05-07 ENCOUNTER — TELEPHONE (OUTPATIENT)
Dept: ALLERGY | Facility: CLINIC | Age: 70
End: 2025-05-07
Payer: MEDICARE

## 2025-05-07 DIAGNOSIS — I10 ESSENTIAL HYPERTENSION: Primary | ICD-10-CM

## 2025-05-07 DIAGNOSIS — J30.9 ALLERGIC RHINITIS, UNSPECIFIED SEASONALITY, UNSPECIFIED TRIGGER: ICD-10-CM

## 2025-05-07 RX ORDER — AZELASTINE 1 MG/ML
1 SPRAY, METERED NASAL 2 TIMES DAILY
Qty: 30 ML | Refills: 0 | Status: SHIPPED | OUTPATIENT
Start: 2025-05-07 | End: 2026-05-07

## 2025-05-07 NOTE — TELEPHONE ENCOUNTER
----- Message from Mireya sent at 5/7/2025  2:36 PM CDT -----  Type:  RX Refill RequestWho Called:  PtRefill or New Rx: RefillRX Name and Strength: isosorbide mononitrate (IMDUR) 30 MG 24 hr tabletHow is the patient currently taking it? (ex. 1XDay):Is this a 30 day or 90 day RX:Preferred Pharm[facility] with phone number:  [id number] [place] [place] - [address] [facility] Order: LocalOrdering Provider: Dr. Garlandould the patient rather a call back or a response via [facility]? [facility] Call Back Number:  [phone] Information: Pt would like to speak to nurse in office regarding medication refill.  Pt is completely out of medication and need refill

## 2025-05-07 NOTE — TELEPHONE ENCOUNTER
"----- Message from Nurse Shoemaker sent at 5/6/2025  5:50 PM CDT -----  Regarding: FW: call back    ----- Message -----  From: Rishi López  Sent: 5/6/2025   4:36 PM CDT  To: Jackelin Aguilar Staff  Subject: call back                                        Consult/Advisory:  Name Of Caller: Self Contact Preference?:[phone] What is the nature of the call?: Calling to speak w/ Shequita in regards to some question she has  Additional Notes:"Thank you for all that you do for our patients"  "

## 2025-05-07 NOTE — TELEPHONE ENCOUNTER
No care due was identified.  [facility] Embedded Care Due Messages. Reference number: [id number].   5/07/2025 12:45:15 PM CDT

## 2025-05-07 NOTE — TELEPHONE ENCOUNTER
Refill Routing Note   Medication(s) are not appropriate for processing by [facility] for the following reason(s):        No active prescription written by provider-last under cardiology    [facility] action(s):  Defer        Medication Therapy Plan: most recent med ordered under cardiology. Prev order has been under PCP.      Appointments  past 12m or future 3m with PCP    Date Provider   Last Visit   1/16/2025 Sadaf Vargas MD   Next Visit   6/11/2025 Sadaf Vargas MD   ED visits in past 90 days: 0        Note composed:1:34 PM 05/07/2025

## 2025-05-08 ENCOUNTER — INFUSION (OUTPATIENT)
Dept: INFUSION THERAPY | Facility: HOSPITAL | Age: 70
End: 2025-05-08
Payer: MEDICARE

## 2025-05-08 ENCOUNTER — OFFICE VISIT (OUTPATIENT)
Dept: PSYCHIATRY | Facility: CLINIC | Age: 70
End: 2025-05-08
Payer: MEDICARE

## 2025-05-08 VITALS
HEART RATE: 70 BPM | HEIGHT: 61 IN | WEIGHT: 101.44 LBS | OXYGEN SATURATION: 99 % | RESPIRATION RATE: 16 BRPM | TEMPERATURE: 98 F | DIASTOLIC BLOOD PRESSURE: 70 MMHG | BODY MASS INDEX: 19.15 KG/M2 | SYSTOLIC BLOOD PRESSURE: 145 MMHG

## 2025-05-08 VITALS
WEIGHT: 103.06 LBS | DIASTOLIC BLOOD PRESSURE: 68 MMHG | BODY MASS INDEX: 19.47 KG/M2 | HEART RATE: 97 BPM | SYSTOLIC BLOOD PRESSURE: 146 MMHG

## 2025-05-08 DIAGNOSIS — F20.0 PARANOID SCHIZOPHRENIA: Primary | ICD-10-CM

## 2025-05-08 DIAGNOSIS — F41.1 GAD (GENERALIZED ANXIETY DISORDER): ICD-10-CM

## 2025-05-08 DIAGNOSIS — F33.42 RECURRENT MAJOR DEPRESSIVE DISORDER, IN FULL REMISSION: ICD-10-CM

## 2025-05-08 DIAGNOSIS — M85.89 OSTEOPENIA OF MULTIPLE SITES: Primary | ICD-10-CM

## 2025-05-08 DIAGNOSIS — Z79.811 USE OF ANASTROZOLE: ICD-10-CM

## 2025-05-08 DIAGNOSIS — M47.816 LUMBAR SPONDYLOSIS: ICD-10-CM

## 2025-05-08 PROCEDURE — 99999 PR PBB SHADOW E&M-EST. PATIENT-LVL III: CPT | Mod: PBBFAC,,, | Performed by: INTERNAL MEDICINE

## 2025-05-08 PROCEDURE — 3077F SYST BP >= 140 MM HG: CPT | Mod: CPTII,S$GLB,, | Performed by: INTERNAL MEDICINE

## 2025-05-08 PROCEDURE — 1160F RVW MEDS BY RX/DR IN RCRD: CPT | Mod: CPTII,S$GLB,, | Performed by: INTERNAL MEDICINE

## 2025-05-08 PROCEDURE — 96374 THER/PROPH/DIAG INJ IV PUSH: CPT

## 2025-05-08 PROCEDURE — 99214 OFFICE O/P EST MOD 30 MIN: CPT | Mod: S$GLB,,, | Performed by: INTERNAL MEDICINE

## 2025-05-08 PROCEDURE — 63600175 PHARM REV CODE 636 W HCPCS: Performed by: STUDENT IN AN ORGANIZED HEALTH CARE EDUCATION/TRAINING PROGRAM

## 2025-05-08 PROCEDURE — 3008F BODY MASS INDEX DOCD: CPT | Mod: CPTII,S$GLB,, | Performed by: INTERNAL MEDICINE

## 2025-05-08 PROCEDURE — 25000003 PHARM REV CODE 250: Performed by: STUDENT IN AN ORGANIZED HEALTH CARE EDUCATION/TRAINING PROGRAM

## 2025-05-08 PROCEDURE — 3078F DIAST BP <80 MM HG: CPT | Mod: CPTII,S$GLB,, | Performed by: INTERNAL MEDICINE

## 2025-05-08 PROCEDURE — 4010F ACE/ARB THERAPY RXD/TAKEN: CPT | Mod: CPTII,S$GLB,, | Performed by: INTERNAL MEDICINE

## 2025-05-08 PROCEDURE — 1159F MED LIST DOCD IN RCRD: CPT | Mod: CPTII,S$GLB,, | Performed by: INTERNAL MEDICINE

## 2025-05-08 RX ORDER — HEPARIN 100 UNIT/ML
500 SYRINGE INTRAVENOUS
Status: DISCONTINUED | OUTPATIENT
Start: 2025-05-08 | End: 2025-05-08 | Stop reason: HOSPADM

## 2025-05-08 RX ORDER — SODIUM CHLORIDE 0.9 % (FLUSH) 0.9 %
10 SYRINGE (ML) INJECTION
OUTPATIENT
Start: 2025-05-20

## 2025-05-08 RX ORDER — SODIUM CHLORIDE 0.9 % (FLUSH) 0.9 %
10 SYRINGE (ML) INJECTION
Status: DISCONTINUED | OUTPATIENT
Start: 2025-05-08 | End: 2025-05-08 | Stop reason: HOSPADM

## 2025-05-08 RX ORDER — ISOSORBIDE MONONITRATE 30 MG/1
30 TABLET, EXTENDED RELEASE ORAL DAILY
Qty: 90 TABLET | Refills: 3 | Status: SHIPPED | OUTPATIENT
Start: 2025-05-08 | End: 2026-05-08

## 2025-05-08 RX ORDER — ZOLEDRONIC ACID 0.04 MG/ML
4 INJECTION, SOLUTION INTRAVENOUS
OUTPATIENT
Start: 2025-05-20

## 2025-05-08 RX ORDER — HEPARIN 100 UNIT/ML
500 SYRINGE INTRAVENOUS
OUTPATIENT
Start: 2025-05-20

## 2025-05-08 RX ORDER — ISOSORBIDE MONONITRATE 30 MG/1
TABLET, EXTENDED RELEASE ORAL
Qty: 90 TABLET | Refills: 3 | Status: SHIPPED | OUTPATIENT
Start: 2025-05-08

## 2025-05-08 RX ADMIN — ZOLEDRONIC ACID 3.5 MG: 4 INJECTION, SOLUTION, CONCENTRATE INTRAVENOUS at 04:05

## 2025-05-08 RX ADMIN — SODIUM CHLORIDE: 9 INJECTION, SOLUTION INTRAVENOUS at 04:05

## 2025-05-08 NOTE — PROGRESS NOTES
OUTPATIENT PSYCHIATRY RETURN VISIT    ENCOUNTER DATE:  5/8/25  SITE:  [facility]  LENGTH OF SESSION:  20 minutes    CHIEF COMPLAINT:  Follow-up      HISTORY OF PRESENTING ILLNESS:  Bhavna Landry is a 69 y.o. female with history of Paranoid Schizophrenia, Depression, and Insomnia who presents for follow up appointment.     Plan at last appointment on 1/22/2025:  Add Effexor 37.5mg capsule to the 75mg capsule to equal 112.5mg daily.  I am hoping this increase will help her depression and anxiety without making her sleepy (unclear if 150mg daily was actually causing fatigue or not since she tolerated this dose for many years).    Most recent sodium level slightly low at 134 in December.  She gets labs every 3 months for RA so will review again at next appointment.  Continue Risperdal 2mg/4mg for paranoid schizophrenia.  Continue Cogentin 0.5mg BID for EPS.   Continue Hydroxyzine 25mg PRN anxiety (rarely takes either).    She is no longer taking Neurontin (caused ankle swelling) and instead is taking Lyrica 50mg qHS prescribed by Pain Management.    Discussed with patient and daughter informed consent, risks versus benefits, alternative treatments, side effect profile and the inherent unpredictability of individual responses to these treatments.  The patient's daughter expresses understanding of the above and displays the capacity to agree with this current plan.  *Cogentin stopped 3/25/25 due to concern with it causing memory loss.      History as told by patient today:  Patient feels she is doing well.  Both granddaughter and grandson are 2nd year in college.  Dynamics at home are good.  Things are good with daughter.  She and son-in-law don't talk - as of 2 weeks ago.  Mood has been good.  Sleep has improved.  She has some trouble walking which she feels is due to back pain and not coming off of Cogentin.  She does not feel she has had any side effects from Risperdal off of Cogentin.   Denies psychosis, anxiety, or depression.     Medication side effects:  Denies  Medication compliance:  Yes    PSYCHIATRIC REVIEW OF SYSTEMS:  Trouble with sleep:  Denies  Appetite changes:  Denies  Weight changes:  Not discussed  Lack of energy:  Denies  Anhedonia:  Denies  Somatic symptoms:  Denies  Libido:  Not discussed  Anxiety/panic:  Denies  Guilty/hopeless:  Denies  Self-injurious behavior/risky behavior:  Denies  Any drugs:  Denies  Alcohol:  Denies    MEDICAL REVIEW OF SYSTEMS:  Complete review of systems performed covering Constitutional, Musculoskeletal, Neurologic.  All systems negative except for that covered in HPI.    PAST PSYCHIATRIC, MEDICAL, AND SOCIAL HISTORY REVIEWED  The patient's past medical, family and social history have been reviewed and updated as appropriate within the electronic medical record - see encounter notes.    MEDICATIONS:    Current Outpatient Medications:     acetaminophen (TYLENOL) 500 MG tablet, Take 1 tablet (500 mg total) by mouth every 6 (six) hours as needed., Disp: , Rfl:     albuterol (PROVENTIL) 2.5 mg /3 mL (0.083 %) nebulizer solution, Take 3 mLs (2.5 mg total) by nebulization every 6 (six) hours as needed for Wheezing. Rescue, Disp: 90 each, Rfl: 3    albuterol (PROVENTIL/VENTOLIN HFA) 90 mcg/actuation inhaler, USE 2 INHALATIONS BY MOUTH 4  TIMES DAILY AS NEEDED, Disp: 25.5 g, Rfl: 3    amLODIPine (NORVASC) 5 MG tablet, Take 1 tablet (5 mg total) by mouth 2 (two) times a day., Disp: 180 tablet, Rfl: 3    ascorbic acid, vitamin C, (VITAMIN C) 500 MG tablet, Take 500 mg by mouth once daily., Disp: , Rfl:     aspirin (ECOTRIN) 81 MG EC tablet, Take 81 mg by mouth once daily., Disp: , Rfl:     atorvastatin (LIPITOR) 80 MG tablet, Take 1 tablet (80 mg total) by mouth once daily., Disp: 90 tablet, Rfl: 3    azelastine (ASTELIN) 137 mcg (0.1 %) nasal spray, 1 spray (137 mcg total) by Nasal route 2 (two) times daily., Disp: 30 mL, Rfl: 0    azelastine (ASTELIN) 137 mcg  (0.1 %) nasal spray, 1 spray (137 mcg total) by Nasal route 2 (two) times daily., Disp: 30 mL, Rfl: 0    baclofen (LIORESAL) 10 MG tablet, 1 tab po tid for muscle cramps., Disp: 60 tablet, Rfl: 1    budesonide-formoterol 80-4.5 mcg (SYMBICORT) 80-4.5 mcg/actuation HFAA, Inhale 2 puffs into the lungs 2 (two) times a day. Controller, Disp: 10.2 g, Rfl: 11    exemestane (AROMASIN) 25 mg tablet, Take 1 tablet (25 mg total) by mouth once daily., Disp: 30 tablet, Rfl: 11    fluticasone propionate (FLONASE) 50 mcg/actuation nasal spray, 1 SPRAY EACH NOSTRIL EVERY DAY, Disp: 32 g, Rfl: 2    folic acid (FOLVITE) 1 MG tablet, Take 1 tablet (1,000 mcg total) by mouth once daily., Disp: 90 tablet, Rfl: 3    HYDROcodone-acetaminophen (NORCO) 5-325 mg per tablet, Take 1 tablet by mouth every 8 (eight) hours as needed for Pain., Disp: 90 tablet, Rfl: 0    immun glob G,IgG,-pro-IgA 0-50 (HIZENTRA) 4 gram/20 mL (20 %) Soln, Inject 60 mLs (12 g total) into the skin every 14 (fourteen) days., Disp: 120 mL, Rfl: 11    isosorbide mononitrate (IMDUR) 30 MG 24 hr tablet, TAKE 1 TABLET(30 MG) BY MOUTH DAILY, Disp: 90 tablet, Rfl: 3    isosorbide mononitrate (IMDUR) 30 MG 24 hr tablet, Take 1 tablet (30 mg total) by mouth once daily., Disp: 90 tablet, Rfl: 3    ketoconazole (NIZORAL) 2 % shampoo, Wash hair with medicated shampoo at least 2x/month - let sit on scalp at least 5 minutes prior to rinsing, Disp: 120 mL, Rfl: 5    LIDOcaine (LIDODERM) 5 %, Place 1 patch onto the skin once daily. Remove & Discard patch within 12 hours or as directed by MD, Disp: 30 patch, Rfl: 0    meloxicam (MOBIC) 7.5 MG tablet, Take 1 tablet (7.5 mg total) by mouth once daily., Disp: 14 tablet, Rfl: 0    meloxicam (MOBIC) 7.5 MG tablet, Take 1 tablet (7.5 mg total) by mouth once daily., Disp: 10 tablet, Rfl: 0    methotrexate 2.5 MG Tab, Take 8 tablets (20 mg total) by mouth every 7 days. TAKE 4 TABLETS MONDAY MORNING AND TAKE 4 TABLETS MONDAY NIGHT ONLY, Disp:  96 tablet, Rfl: 0    omega-3 fatty acids/fish oil (FISH OIL-OMEGA-3 FATTY ACIDS) 300-1,000 mg capsule, Take by mouth once daily., Disp: , Rfl:     omeprazole (PRILOSEC) 40 MG capsule, TAKE 1 CAPSULE(40 MG) BY MOUTH EVERY MORNING, Disp: 90 capsule, Rfl: 3    oxyCODONE (ROXICODONE) 5 MG immediate release tablet, Take 1 tablet (5 mg total) by mouth every 12 (twelve) hours as needed., Disp: 10 tablet, Rfl: 0    pregabalin (LYRICA) 50 MG capsule, Take 1 capsule (50 mg total) by mouth 2 (two) times daily., Disp: 60 capsule, Rfl: 5    risperiDONE (RISPERDAL) 2 MG tablet, TAKE 1 TABLET(2 MG) BY MOUTH EVERY MORNING, Disp: 90 tablet, Rfl: 3    risperiDONE (RISPERDAL) 4 MG tablet, Take 1 tablet (4 mg total) by mouth every evening., Disp: 90 tablet, Rfl: 3    traMADoL (ULTRAM) 50 mg tablet, Take 1 tablet (50 mg total) by mouth every 8 (eight) hours as needed for Pain., Disp: 30 tablet, Rfl: 0    traMADoL (ULTRAM) 50 mg tablet, Take 1 tablet (50 mg total) by mouth every 8 (eight) hours as needed for Pain., Disp: 90 each, Rfl: 0    valsartan (DIOVAN) 160 MG tablet, Take 160 mg by mouth once daily. Patient can also take 80mg in the evening, Disp: , Rfl:     venlafaxine (EFFEXOR-XR) 37.5 MG 24 hr capsule, Take 1 capsule (37.5 mg total) by mouth once daily. Take with 75mg capsule to equal 112.5mg daily., Disp: 90 capsule, Rfl: 3    venlafaxine (EFFEXOR-XR) 75 MG 24 hr capsule, Take 1 capsule (75 mg total) by mouth once daily. Take with 37.5mg capsule to equal 112.5mg daily., Disp: 90 capsule, Rfl: 3    vitamin D (VITAMIN D3) 1000 units Tab, Take 1,000 Units by mouth once daily., Disp: , Rfl:     vitamin E 200 UNIT capsule, Take 200 Units by mouth once daily., Disp: , Rfl:     zinc gluconate 50 mg tablet, Take 50 mg by mouth once daily., Disp: , Rfl:   No current facility-administered medications for this visit.    Facility-Administered Medications Ordered in Other Visits:     tropicamide 1% ophthalmic solution 1 drop, 1 drop, Right  "Eye, On Call Procedure, Karen Song MD, 1 drop at 08/01/19 0648    ALLERGIES:  Review of patient's allergies indicates:   Allergen Reactions    Etanercept Other (See Comments)     Other reaction(s): recurrent infections    Chloramphenicol sod succinate Hives    Codeine Other (See Comments)     Other reaction(s): Stomach upset. Pt states OK with Percocet    Nickel sutures [surgical stainless steel] Dermatitis     Allergic contact dermatitis    Adhesive Rash       PSYCHIATRIC EXAM:  Vitals:    05/08/25 1507   BP: (!) 146/68   Pulse: 97   Weight: 46.7 kg (103 lb 1 oz)     Appearance:  Well groomed, appearing healthy and of stated age  Behavior:  Cooperative, pleasant, no psychomotor agitation or retardation  Speech:  Normal rate, rhythm, prosody, and volume  Mood:  "Good"  Affect:  Euthymic  Thought Process:  Linear, logical, goal directed  Thought Content:  Negative for suicidal ideation, homicidal ideation, delusions or hallucinations.  No paranoia.    Associations:  Intact  Memory:  Fair  Level of Consciousness/Orientation:  Grossly intact  Fund of Knowledge:  Fair  Attention:  Good  Language:  Fluent, able to name abstract and concrete objects  Insight:  Fair  Judgment:  Intact  Psychomotor signs:  No involuntary movements or tremor, no stiffness or cogwheeling on physical exam.    Gait:  Some shuffling gait which she feels is due to her back pain      RELEVANT LABS/STUDIES:    AIMS 0 on 5/8/25    Lab Results   Component Value Date    WBC 6.21 04/10/2025    HGB 12.0 04/10/2025    HCT 37.2 04/10/2025    MCV 96 04/10/2025     04/10/2025     BMP  Lab Results   Component Value Date     04/10/2025    K 4.5 04/10/2025     04/10/2025    CO2 29 04/10/2025    BUN 5 (L) 04/10/2025    CREATININE 0.7 04/10/2025    CALCIUM 8.9 04/10/2025    ANIONGAP 8 04/10/2025    ESTGFRAFRICA >60.0 04/13/2022    EGFRNONAA >60.0 04/13/2022     Lab Results   Component Value Date    ALT 10 04/10/2025    AST 15 " 04/10/2025    GGT 91 (H) 07/28/2014    ALKPHOS 81 04/10/2025    BILITOT 0.3 04/10/2025     Lab Results   Component Value Date    TSH 1.574 04/10/2025     Lab Results   Component Value Date    HGBA1C 5.3 12/11/2018       IMPRESSION:    Bhavna Landry is a 69 y.o. female with history of Paranoid Schizophrenia, Depression, and Insomnia who presents for follow up appointment.    Status/Progress:  Based on the examination today, the patient's problem(s) is/are stable.  New problems have not been presented today.    Risk Parameters:  Patient reports no suicidal ideation  Patient reports no homicidal ideation  Patient reports no self-injurious behavior  Patient reports no violent behavior        DIAGNOSES:    ICD-10-CM ICD-9-CM   1. Paranoid schizophrenia  F20.0 295.30   2. RASHIDA (generalized anxiety disorder)  F41.1 300.02   3. Recurrent major depressive disorder, in full remission  F33.42 296.36   4. Lumbar spondylosis  M47.816 721.3       PLAN:  Patient feels she is doing well.  No medication changes today.    Continue Risperdal 2mg/4mg for paranoid schizophrenia.  She is no longer taking Cogentin due to risk of memory loss.    Continue Effexor 112.5mg daily for depression and anxiety.    Discussed with patient and daughter informed consent, risks versus benefits, alternative treatments, side effect profile and the inherent unpredictability of individual responses to these treatments.  The patient's daughter expresses understanding of the above and displays the capacity to agree with this current plan.    RETURN TO CLINIC:  Follow up in about 3 months (around 8/8/2025).

## 2025-05-08 NOTE — PLAN OF CARE
Pt received Zometa today and tolerated well, without complications. VSS throughout infusion. Educated patient about Zometa (indications, side effects, possible reactions, precautions) and verbalized understanding. PIV positive for blood return, saline locked and removed prior to DC, catheter tip intact. Pt DC with no distress noted, ambulated off of unit, via self, w/o event, pleased.

## 2025-05-09 ENCOUNTER — TELEPHONE (OUTPATIENT)
Dept: PAIN MEDICINE | Facility: CLINIC | Age: 70
End: 2025-05-09
Payer: MEDICARE

## 2025-05-09 NOTE — TELEPHONE ENCOUNTER
----- Message from Anu sent at 5/9/2025  4:01 PM CDT -----  Type:  Patient Returning CallWho Called:ptWho Left Message for Patient:Alphonse Fields the patient know what this is regarding?:returning callWould the patient rather a call back or a response via [facility]? callBest Call Back Number:Donnie, CelsaaAdditional Information:

## 2025-05-09 NOTE — TELEPHONE ENCOUNTER
----- Message from Maile Storm sent at 5/9/2025  1:16 PM CDT -----  Hey! Happy Friday!  I am off today  -- any idea what injection she wants to schedule?  ----- Message -----  From: Mireya Fields  Sent: 5/9/2025  11:42 AM CDT  To: Maile Storm, DO    Please advise Thank youLisa  ----- Message -----  From: Love Mitchell  Sent: 5/9/2025  10:39 AM CDT  To: Emeterio Gr Staff    Type:  Needs Medical Advice/injection Who Called: ptWould the patient rather a call back or a response via [facility]? [facility] Call Back Number: [phone] Information: pt requesting to have back injection scheduled

## 2025-05-12 ENCOUNTER — TELEPHONE (OUTPATIENT)
Dept: PAIN MEDICINE | Facility: CLINIC | Age: 70
End: 2025-05-12
Payer: MEDICARE

## 2025-05-12 DIAGNOSIS — M54.17 LUMBOSACRAL RADICULOPATHY: Primary | ICD-10-CM

## 2025-05-12 DIAGNOSIS — M54.16 LUMBAR RADICULOPATHY: Primary | ICD-10-CM

## 2025-05-12 DIAGNOSIS — M48.062 SPINAL STENOSIS OF LUMBAR REGION WITH NEUROGENIC CLAUDICATION: ICD-10-CM

## 2025-05-12 DIAGNOSIS — M54.16 LUMBAR RADICULOPATHY: ICD-10-CM

## 2025-05-12 NOTE — PROGRESS NOTES
Patient requesting repeat caudal epidural steroid injection.  Previous injection provided her with greater than 4 months of significant pain relief (>80%).    Maile Storm, DO   Interventional Pain Management

## 2025-05-12 NOTE — TELEPHONE ENCOUNTER
----- Message from Nani Storm sent at 2025 12:50 PM CDT -----  Regarding: Order for PHUONG HAZEL    Patient Name: PHUONG HAZEL([id number])  Sex: Female  : 1955      PCP: MILLIE TELLO    Center: [facility] CENTRAL BILLING OFFICE     Types of orders made on 2025: Procedure Request    Order Date:2025  Ordering User:NANI STORM [[id number]]  Encounter Provider:Nani Storm DO [[id number]]  Authorizing Provider: Nani Storm DO [[id number]]  Department:[facility][[id number]]    Common Order Information  Procedure -> Epidural Injection (specify level) Cmt: Caudal    Pre-op Diagnosis -> Lumbar radiculopathy     Order Specific Information  Order: Procedure Request Order for Pain Management [Custom: [id number]]  Order #:          [id number]: 1 FUTURE    Priority: Routine  Class: Clinic Performed    Future Order Information      Expires on:2026            Expected by:2025                   Associated Diagnoses      M54.17 Lumbosacral radiculopathy      M54.16 Lumbar radiculopathy      M48.062 Spinal stenosis of lumbar region with neurogenic claudication      Physician -> Gelter         Is patient on anti-coagulants? -> Yes         Facility Name: -> [facility]         Follow-up: -> 4 weeks Cmt: Daniel Luke          Priority: Routine  Class: Clinic Performed    Future Order Information      Expires on:2026            Expected by:2025                   Associated Diagnoses      M54.17 Lumbosacral radiculopathy      M54.16 Lumbar radiculopathy      M48.062 Spinal stenosis of lumbar region with neurogenic claudication      Procedure -> Epidural Injection (specify level) Cmt: Caudal        Physician -> Gelter         Is patient on anti-coagulants? -> Yes         Pre-op Diagnosis -> Lumbar radiculopathy         Facility Name: -> [facility]         Follow-up: -> 4 weeks Cmt: Daniel Maykel

## 2025-05-14 ENCOUNTER — TELEPHONE (OUTPATIENT)
Dept: INTERNAL MEDICINE | Facility: CLINIC | Age: 70
End: 2025-05-14
Payer: MEDICARE

## 2025-05-14 DIAGNOSIS — Z86.0101 HISTORY OF ADENOMATOUS POLYP OF COLON: Primary | ICD-10-CM

## 2025-05-14 DIAGNOSIS — Z12.11 COLON CANCER SCREENING: ICD-10-CM

## 2025-05-14 NOTE — TELEPHONE ENCOUNTER
----- Message from Marian sent at 5/14/2025 12:06 PM CDT -----  Contact: [phone]  Type: Orders RequestWhat orders/ testing are being requested? Colonoscopy Is there a future appointment scheduled for the patient with PCP? Yes When? 06/11/2025Would you prefer a response via [facility]? Call back and portal Comments: Thank you

## 2025-05-19 ENCOUNTER — INFUSION (OUTPATIENT)
Dept: INFECTIOUS DISEASES | Facility: HOSPITAL | Age: 70
End: 2025-05-19
Payer: MEDICARE

## 2025-05-19 VITALS
DIASTOLIC BLOOD PRESSURE: 71 MMHG | WEIGHT: 102.94 LBS | HEIGHT: 61 IN | OXYGEN SATURATION: 94 % | SYSTOLIC BLOOD PRESSURE: 165 MMHG | RESPIRATION RATE: 16 BRPM | TEMPERATURE: 98 F | HEART RATE: 58 BPM | BODY MASS INDEX: 19.43 KG/M2

## 2025-05-19 DIAGNOSIS — D83.9 COMMON VARIABLE IMMUNODEFICIENCY: Primary | ICD-10-CM

## 2025-05-19 PROCEDURE — 96365 THER/PROPH/DIAG IV INF INIT: CPT

## 2025-05-19 PROCEDURE — 63600175 PHARM REV CODE 636 W HCPCS: Mod: JZ,TB | Performed by: STUDENT IN AN ORGANIZED HEALTH CARE EDUCATION/TRAINING PROGRAM

## 2025-05-19 PROCEDURE — 96366 THER/PROPH/DIAG IV INF ADDON: CPT

## 2025-05-19 RX ORDER — DIPHENHYDRAMINE HYDROCHLORIDE 50 MG/ML
25 INJECTION, SOLUTION INTRAMUSCULAR; INTRAVENOUS
Status: DISCONTINUED | OUTPATIENT
Start: 2025-05-19 | End: 2025-05-19 | Stop reason: HOSPADM

## 2025-05-19 RX ORDER — SODIUM CHLORIDE 0.9 % (FLUSH) 0.9 %
10 SYRINGE (ML) INJECTION
OUTPATIENT
Start: 2025-06-16

## 2025-05-19 RX ORDER — HEPARIN 100 UNIT/ML
500 SYRINGE INTRAVENOUS
Status: DISCONTINUED | OUTPATIENT
Start: 2025-05-19 | End: 2025-05-19 | Stop reason: HOSPADM

## 2025-05-19 RX ORDER — ACETAMINOPHEN 500 MG
500 TABLET ORAL
Status: DISCONTINUED | OUTPATIENT
Start: 2025-05-19 | End: 2025-05-19 | Stop reason: HOSPADM

## 2025-05-19 RX ORDER — DIPHENHYDRAMINE HCL 25 MG
25 CAPSULE ORAL
OUTPATIENT
Start: 2025-06-16

## 2025-05-19 RX ORDER — EPINEPHRINE 0.3 MG/.3ML
0.3 INJECTION SUBCUTANEOUS ONCE AS NEEDED
OUTPATIENT
Start: 2025-06-16

## 2025-05-19 RX ORDER — HEPARIN 100 UNIT/ML
500 SYRINGE INTRAVENOUS
OUTPATIENT
Start: 2025-06-16

## 2025-05-19 RX ORDER — EPINEPHRINE 0.3 MG/.3ML
0.3 INJECTION SUBCUTANEOUS ONCE AS NEEDED
Status: DISCONTINUED | OUTPATIENT
Start: 2025-05-19 | End: 2025-05-19 | Stop reason: HOSPADM

## 2025-05-19 RX ORDER — DIPHENHYDRAMINE HCL 25 MG
25 CAPSULE ORAL
Status: DISCONTINUED | OUTPATIENT
Start: 2025-05-19 | End: 2025-05-19 | Stop reason: HOSPADM

## 2025-05-19 RX ORDER — SODIUM CHLORIDE 0.9 % (FLUSH) 0.9 %
10 SYRINGE (ML) INJECTION
Status: DISCONTINUED | OUTPATIENT
Start: 2025-05-19 | End: 2025-05-19 | Stop reason: HOSPADM

## 2025-05-19 RX ORDER — ACETAMINOPHEN 500 MG
500 TABLET ORAL
OUTPATIENT
Start: 2025-06-16

## 2025-05-19 RX ORDER — DIPHENHYDRAMINE HYDROCHLORIDE 50 MG/ML
25 INJECTION, SOLUTION INTRAMUSCULAR; INTRAVENOUS
OUTPATIENT
Start: 2025-06-16

## 2025-05-19 RX ADMIN — HUMAN IMMUNOGLOBULIN G 25 G: 20 LIQUID INTRAVENOUS at 12:05

## 2025-05-19 NOTE — PROGRESS NOTES
Arrived for Privigen 25 g 1/1 as ordered. Started infusion as 14 ml/hr for the first 30mins.and then increased to 28ml/hr for the next 30mins. Followed by 56ml/hr--Max rate of 112.     Aware of next appt. In June.     Limited head-to-toe assessment due to privacy issues and visit reason though the opportunity was given for patient to express any concerns

## 2025-05-20 ENCOUNTER — OFFICE VISIT (OUTPATIENT)
Dept: DERMATOLOGY | Facility: CLINIC | Age: 70
End: 2025-05-20
Payer: MEDICARE

## 2025-05-20 DIAGNOSIS — T14.8XXA ABRASION: Primary | ICD-10-CM

## 2025-05-20 PROCEDURE — 99999 PR PBB SHADOW E&M-EST. PATIENT-LVL IV: CPT | Mod: PBBFAC,,, | Performed by: DERMATOLOGY

## 2025-05-20 RX ORDER — MUPIROCIN 20 MG/G
OINTMENT TOPICAL
Qty: 30 G | Refills: 3 | Status: SHIPPED | OUTPATIENT
Start: 2025-05-20

## 2025-05-20 NOTE — PROGRESS NOTES
Subjective:      Patient ID:  Bhavna Landry is a 69 y.o. female who presents for   Chief Complaint   Patient presents with    Lesion     Right third finger  and  nose     She states she has a new lesion on her right third digit, the previous lesion resolved.  She hit her hand on the edge of a metal cupboard a few weeks ago.  Also has a small abrasion on her nose states she scratched it when washing face 2 weeks ago.     Lesion - Initial  Affected locations: nose and right fingers  Signs / symptoms: irritated and pain    Review of Systems   Constitutional:  Negative for fever, chills, weight loss, weight gain, fatigue, night sweats and malaise.   Skin:  Negative for itching, rash, daily sunscreen use, activity-related sunscreen use and wears hat.   Hematologic/Lymphatic: Does not bruise/bleed easily.       Objective:   Physical Exam   Constitutional: She appears well-developed and well-nourished.   Neurological: She is alert and oriented to person, place, and time.   Psychiatric: She has a normal mood and affect.   Skin:   Areas Examined (abnormalities noted in diagram):   Head / Face Inspection Performed  Nails and Digits Inspection Performed                Diagram Legend     Erythematous scaling macule/papule c/w actinic keratosis       Vascular papule c/w angioma      Pigmented verrucoid papule/plaque c/w seborrheic keratosis      Yellow umbilicated papule c/w sebaceous hyperplasia      Irregularly shaped tan macule c/w lentigo     1-2 mm smooth white papules consistent with Milia      Movable subcutaneous cyst with punctum c/w epidermal inclusion cyst      Subcutaneous movable cyst c/w pilar cyst      Firm pink to brown papule c/w dermatofibroma      Pedunculated fleshy papule(s) c/w skin tag(s)      Evenly pigmented macule c/w junctional nevus     Mildly variegated pigmented, slightly irregular-bordered macule c/w mildly atypical nevus      Flesh colored to evenly pigmented papule c/w intradermal nevus        Pink pearly papule/plaque c/w basal cell carcinoma      Erythematous hyperkeratotic cursted plaque c/w SCC      Surgical scar with no sign of skin cancer recurrence      Open and closed comedones      Inflammatory papules and pustules      Verrucoid papule consistent consistent with wart     Erythematous eczematous patches and plaques     Dystrophic onycholytic nail with subungual debris c/w onychomycosis     Umbilicated papule    Erythematous-base heme-crusted tan verrucoid plaque consistent with inflamed seborrheic keratosis     Erythematous Silvery Scaling Plaque c/w Psoriasis     See annotation      Assessment / Plan:        Abrasion  -     mupirocin (BACTROBAN) 2 % ointment; Use qd  Dispense: 30 g; Refill: 3  Cryosurgery procedure note:    Verbal consent from the patient is obtained including, but not limited to, risk of hypopigmentation/hyperpigmentation, scar, recurrence of lesion. Liquid nitrogen cryosurgery is applied to 2  lesions to produce a freeze injury.     She will call if lesion on finger persists, would need removal  (possible ganglion cyst vs ca.)

## 2025-05-21 ENCOUNTER — PATIENT MESSAGE (OUTPATIENT)
Dept: DERMATOLOGY | Facility: CLINIC | Age: 70
End: 2025-05-21
Payer: MEDICARE

## 2025-05-22 ENCOUNTER — TELEPHONE (OUTPATIENT)
Dept: HEMATOLOGY/ONCOLOGY | Facility: CLINIC | Age: 70
End: 2025-05-22
Payer: MEDICARE

## 2025-05-22 NOTE — TELEPHONE ENCOUNTER
----- Message from Syl Prescott NP sent at 5/22/2025  1:54 PM CDT -----  Regarding: RE: Consult/Advisory  I would push out until march 2026  ----- Message -----  From: Kennedi Fuentes RN  Sent: 5/21/2025   2:19 PM CDT  To: Cheyanne Smith NP  Subject: FW: Consult/Advisory                             Cheyanne, Do you wish to begin a PA or do you wish to push DXA out . Please advise thanks.  ----- Message -----  From: Dahlia Ulloa  Sent: 5/21/2025   2:06 PM CDT  To: Luis CAMPOS Staff  Subject: Consult/Advisory                                 Name Of Caller:    Regan Preference:  [phone] Nature of call:    Insurance informed pt the bone density tests can only be covered every 2 years. They need a prior authorization for it to be done sooner. Pt would like a call back to discuss.

## 2025-05-22 NOTE — TELEPHONE ENCOUNTER
Patient contacted with advisement from provider  She was informed that her DEXA scan will be pushed out until 3/2026 to insure insurance coverage.  Patient verbalized understanding.  No further questions or concerns noted during call.

## 2025-05-25 DIAGNOSIS — Z17.0 CARCINOMA OF UPPER-OUTER QUADRANT OF RIGHT BREAST IN FEMALE, ESTROGEN RECEPTOR POSITIVE: ICD-10-CM

## 2025-05-25 DIAGNOSIS — C50.411 CARCINOMA OF UPPER-OUTER QUADRANT OF RIGHT BREAST IN FEMALE, ESTROGEN RECEPTOR POSITIVE: ICD-10-CM

## 2025-05-26 ENCOUNTER — OFFICE VISIT (OUTPATIENT)
Dept: OPHTHALMOLOGY | Facility: CLINIC | Age: 70
End: 2025-05-26
Payer: MEDICARE

## 2025-05-26 ENCOUNTER — CLINICAL SUPPORT (OUTPATIENT)
Dept: OPHTHALMOLOGY | Facility: CLINIC | Age: 70
End: 2025-05-26
Payer: MEDICARE

## 2025-05-26 DIAGNOSIS — H43.813 POSTERIOR VITREOUS DETACHMENT, BOTH EYES: ICD-10-CM

## 2025-05-26 DIAGNOSIS — H35.62 RETINAL HEMORRHAGE, LEFT: ICD-10-CM

## 2025-05-26 DIAGNOSIS — H35.3131 EARLY DRY STAGE NONEXUDATIVE AGE-RELATED MACULAR DEGENERATION OF BOTH EYES: Primary | ICD-10-CM

## 2025-05-26 DIAGNOSIS — H35.3132 INTERMEDIATE STAGE NONEXUDATIVE AGE-RELATED MACULAR DEGENERATION OF BOTH EYES: ICD-10-CM

## 2025-05-26 PROCEDURE — 3288F FALL RISK ASSESSMENT DOCD: CPT | Mod: CPTII,S$GLB,, | Performed by: OPHTHALMOLOGY

## 2025-05-26 PROCEDURE — 4010F ACE/ARB THERAPY RXD/TAKEN: CPT | Mod: CPTII,S$GLB,, | Performed by: OPHTHALMOLOGY

## 2025-05-26 PROCEDURE — 1159F MED LIST DOCD IN RCRD: CPT | Mod: CPTII,S$GLB,, | Performed by: OPHTHALMOLOGY

## 2025-05-26 PROCEDURE — 99999 PR PBB SHADOW E&M-EST. PATIENT-LVL III: CPT | Mod: PBBFAC,,, | Performed by: OPHTHALMOLOGY

## 2025-05-26 PROCEDURE — 1101F PT FALLS ASSESS-DOCD LE1/YR: CPT | Mod: CPTII,S$GLB,, | Performed by: OPHTHALMOLOGY

## 2025-05-26 PROCEDURE — 92014 COMPRE OPH EXAM EST PT 1/>: CPT | Mod: S$GLB,,, | Performed by: OPHTHALMOLOGY

## 2025-05-26 PROCEDURE — 92201 OPSCPY EXTND RTA DRAW UNI/BI: CPT | Mod: S$GLB,,, | Performed by: OPHTHALMOLOGY

## 2025-05-26 PROCEDURE — 92134 CPTRZ OPH DX IMG PST SGM RTA: CPT | Mod: S$GLB,,, | Performed by: OPHTHALMOLOGY

## 2025-05-26 PROCEDURE — 1160F RVW MEDS BY RX/DR IN RCRD: CPT | Mod: CPTII,S$GLB,, | Performed by: OPHTHALMOLOGY

## 2025-05-26 RX ORDER — TRAMADOL HYDROCHLORIDE 50 MG/1
50 TABLET, FILM COATED ORAL EVERY 8 HOURS PRN
Qty: 90 TABLET | Refills: 0 | Status: SHIPPED | OUTPATIENT
Start: 2025-05-26

## 2025-05-26 NOTE — PROGRESS NOTES
HPI     dfe      oct        pvd             Additional comments: Dfe      Oct  Amd      Duane  OD   Amaurosis     Dls 09/20/22          Last edited by Tania Rashid on 5/26/2025  2:42 PM.            OCT few drusen      A/P    1. VD OS -  No tears or breaks    RD precautions    2. PVD OD    3. PCIOL OU  S/p YAG OU    4. Duane's OD    5. VR tag with small flap OD @ 7:00 s/p barrier 10/13 with Dr. Moy    6. Pingueculum OD - AT's    7. Brow ptosis on right  X 1-2 years - Consult [facility] for Brow lift    8. Brief episode of color Va change  Lasted seconds.  Resolved without recurrence  Amaurosis precautions    9. Intermediate AMD OU        1 yr. OCT/FAF and dilate

## 2025-05-26 NOTE — TELEPHONE ENCOUNTER
No care due was identified.  [facility] Embedded Care Due Messages. Reference number: [id number].   5/25/2025 8:43:42 PM CDT

## 2025-05-26 NOTE — TELEPHONE ENCOUNTER
Refill Routing Note   Medication(s) are not appropriate for processing by [facility] for the following reason(s):        Outside of protocol    [facility] action(s):  Route             Appointments  past 12m or future 3m with PCP    Date Provider   Last Visit   1/16/2025 Sadaf Vargas MD   Next Visit   6/11/2025 Sadaf Vargas MD   ED visits in past 90 days: 0        Note composed:8:04 AM 05/26/2025

## 2025-05-30 ENCOUNTER — TELEPHONE (OUTPATIENT)
Dept: PAIN MEDICINE | Facility: CLINIC | Age: 70
End: 2025-05-30
Payer: MEDICARE

## 2025-06-03 ENCOUNTER — TELEPHONE (OUTPATIENT)
Dept: ALLERGY | Facility: CLINIC | Age: 70
End: 2025-06-03
Payer: MEDICARE

## 2025-06-03 ENCOUNTER — TELEPHONE (OUTPATIENT)
Dept: PAIN MEDICINE | Facility: CLINIC | Age: 70
End: 2025-06-03
Payer: MEDICARE

## 2025-06-04 ENCOUNTER — TELEPHONE (OUTPATIENT)
Dept: INTERNAL MEDICINE | Facility: CLINIC | Age: 70
End: 2025-06-04
Payer: MEDICARE

## 2025-06-05 ENCOUNTER — TELEPHONE (OUTPATIENT)
Dept: PAIN MEDICINE | Facility: CLINIC | Age: 70
End: 2025-06-05
Payer: MEDICARE

## 2025-06-10 ENCOUNTER — LAB VISIT (OUTPATIENT)
Dept: LAB | Facility: HOSPITAL | Age: 70
End: 2025-06-10
Attending: INTERNAL MEDICINE
Payer: MEDICARE

## 2025-06-10 ENCOUNTER — TELEPHONE (OUTPATIENT)
Dept: PAIN MEDICINE | Facility: HOSPITAL | Age: 70
End: 2025-06-10
Payer: MEDICARE

## 2025-06-10 ENCOUNTER — RESULTS FOLLOW-UP (OUTPATIENT)
Dept: RHEUMATOLOGY | Facility: CLINIC | Age: 70
End: 2025-06-10

## 2025-06-10 DIAGNOSIS — L40.50 PSA (PSORIATIC ARTHRITIS): ICD-10-CM

## 2025-06-10 LAB
ABSOLUTE EOSINOPHIL (OHS): 0.13 K/UL
ABSOLUTE MONOCYTE (OHS): 0.51 K/UL (ref 0.3–1)
ABSOLUTE NEUTROPHIL COUNT (OHS): 4.93 K/UL (ref 1.8–7.7)
ALBUMIN SERPL BCP-MCNC: 3.7 G/DL (ref 3.5–5.2)
ALP SERPL-CCNC: 65 UNIT/L (ref 40–150)
ALT SERPL W/O P-5'-P-CCNC: 16 UNIT/L (ref 10–44)
ANION GAP (OHS): 9 MMOL/L (ref 8–16)
AST SERPL-CCNC: 26 UNIT/L (ref 11–45)
BASOPHILS # BLD AUTO: 0.06 K/UL
BASOPHILS NFR BLD AUTO: 0.9 %
BILIRUB SERPL-MCNC: 0.2 MG/DL (ref 0.1–1)
BUN SERPL-MCNC: 8 MG/DL (ref 8–23)
CALCIUM SERPL-MCNC: 9.2 MG/DL (ref 8.7–10.5)
CHLORIDE SERPL-SCNC: 97 MMOL/L (ref 95–110)
CO2 SERPL-SCNC: 26 MMOL/L (ref 23–29)
CREAT SERPL-MCNC: 0.8 MG/DL (ref 0.5–1.4)
CRP SERPL-MCNC: 0.4 MG/L
ERYTHROCYTE [DISTWIDTH] IN BLOOD BY AUTOMATED COUNT: 16.9 % (ref 11.5–14.5)
ERYTHROCYTE [SEDIMENTATION RATE] IN BLOOD BY PHOTOMETRIC METHOD: 4 MM/HR
GFR SERPLBLD CREATININE-BSD FMLA CKD-EPI: >60 ML/MIN/1.73/M2
GLUCOSE SERPL-MCNC: 85 MG/DL (ref 70–110)
HCT VFR BLD AUTO: 36.6 % (ref 37–48.5)
HGB BLD-MCNC: 11.5 GM/DL (ref 12–16)
IMM GRANULOCYTES # BLD AUTO: 0.03 K/UL (ref 0–0.04)
IMM GRANULOCYTES NFR BLD AUTO: 0.4 % (ref 0–0.5)
LYMPHOCYTES # BLD AUTO: 1.32 K/UL (ref 1–4.8)
MCH RBC QN AUTO: 29.9 PG (ref 27–31)
MCHC RBC AUTO-ENTMCNC: 31.4 G/DL (ref 32–36)
MCV RBC AUTO: 95 FL (ref 82–98)
NUCLEATED RBC (/100WBC) (OHS): 0 /100 WBC
PLATELET # BLD AUTO: 322 K/UL (ref 150–450)
PMV BLD AUTO: 9.2 FL (ref 9.2–12.9)
POTASSIUM SERPL-SCNC: 4.8 MMOL/L (ref 3.5–5.1)
PROT SERPL-MCNC: 6.2 GM/DL (ref 6–8.4)
RBC # BLD AUTO: 3.84 M/UL (ref 4–5.4)
RELATIVE EOSINOPHIL (OHS): 1.9 %
RELATIVE LYMPHOCYTE (OHS): 18.9 % (ref 18–48)
RELATIVE MONOCYTE (OHS): 7.3 % (ref 4–15)
RELATIVE NEUTROPHIL (OHS): 70.6 % (ref 38–73)
SODIUM SERPL-SCNC: 132 MMOL/L (ref 136–145)
WBC # BLD AUTO: 6.98 K/UL (ref 3.9–12.7)

## 2025-06-10 PROCEDURE — 36415 COLL VENOUS BLD VENIPUNCTURE: CPT | Mod: PO

## 2025-06-10 PROCEDURE — 85652 RBC SED RATE AUTOMATED: CPT

## 2025-06-10 PROCEDURE — 80053 COMPREHEN METABOLIC PANEL: CPT

## 2025-06-10 PROCEDURE — 85025 COMPLETE CBC W/AUTO DIFF WBC: CPT

## 2025-06-10 PROCEDURE — 86140 C-REACTIVE PROTEIN: CPT

## 2025-06-10 RX ORDER — SODIUM CHLORIDE 9 MG/ML
INJECTION, SOLUTION INTRAVENOUS CONTINUOUS
OUTPATIENT
Start: 2025-06-10

## 2025-06-11 ENCOUNTER — OFFICE VISIT (OUTPATIENT)
Dept: INTERNAL MEDICINE | Facility: CLINIC | Age: 70
End: 2025-06-11
Payer: MEDICARE

## 2025-06-11 VITALS
HEIGHT: 61 IN | SYSTOLIC BLOOD PRESSURE: 146 MMHG | DIASTOLIC BLOOD PRESSURE: 52 MMHG | BODY MASS INDEX: 19.35 KG/M2 | OXYGEN SATURATION: 98 % | WEIGHT: 102.5 LBS | HEART RATE: 70 BPM

## 2025-06-11 DIAGNOSIS — D64.9 ANEMIA, UNSPECIFIED TYPE: Primary | ICD-10-CM

## 2025-06-11 DIAGNOSIS — M48.062 SPINAL STENOSIS OF LUMBAR REGION WITH NEUROGENIC CLAUDICATION: ICD-10-CM

## 2025-06-11 DIAGNOSIS — M54.12 CERVICAL RADICULOPATHY: ICD-10-CM

## 2025-06-11 DIAGNOSIS — M54.17 LUMBOSACRAL RADICULOPATHY: ICD-10-CM

## 2025-06-11 DIAGNOSIS — M54.16 LUMBAR RADICULOPATHY: ICD-10-CM

## 2025-06-11 DIAGNOSIS — J30.9 ALLERGIC RHINITIS, UNSPECIFIED SEASONALITY, UNSPECIFIED TRIGGER: ICD-10-CM

## 2025-06-11 PROCEDURE — 99214 OFFICE O/P EST MOD 30 MIN: CPT | Mod: S$GLB,,, | Performed by: INTERNAL MEDICINE

## 2025-06-11 PROCEDURE — 3077F SYST BP >= 140 MM HG: CPT | Mod: CPTII,S$GLB,, | Performed by: INTERNAL MEDICINE

## 2025-06-11 PROCEDURE — 1125F AMNT PAIN NOTED PAIN PRSNT: CPT | Mod: CPTII,S$GLB,, | Performed by: INTERNAL MEDICINE

## 2025-06-11 PROCEDURE — 99999 PR PBB SHADOW E&M-EST. PATIENT-LVL IV: CPT | Mod: PBBFAC,,, | Performed by: INTERNAL MEDICINE

## 2025-06-11 PROCEDURE — 1160F RVW MEDS BY RX/DR IN RCRD: CPT | Mod: CPTII,S$GLB,, | Performed by: INTERNAL MEDICINE

## 2025-06-11 PROCEDURE — 4010F ACE/ARB THERAPY RXD/TAKEN: CPT | Mod: CPTII,S$GLB,, | Performed by: INTERNAL MEDICINE

## 2025-06-11 PROCEDURE — 1159F MED LIST DOCD IN RCRD: CPT | Mod: CPTII,S$GLB,, | Performed by: INTERNAL MEDICINE

## 2025-06-11 PROCEDURE — 1101F PT FALLS ASSESS-DOCD LE1/YR: CPT | Mod: CPTII,S$GLB,, | Performed by: INTERNAL MEDICINE

## 2025-06-11 PROCEDURE — 3078F DIAST BP <80 MM HG: CPT | Mod: CPTII,S$GLB,, | Performed by: INTERNAL MEDICINE

## 2025-06-11 PROCEDURE — 3008F BODY MASS INDEX DOCD: CPT | Mod: CPTII,S$GLB,, | Performed by: INTERNAL MEDICINE

## 2025-06-11 PROCEDURE — 3288F FALL RISK ASSESSMENT DOCD: CPT | Mod: CPTII,S$GLB,, | Performed by: INTERNAL MEDICINE

## 2025-06-11 PROCEDURE — G2211 COMPLEX E/M VISIT ADD ON: HCPCS | Mod: S$GLB,,, | Performed by: INTERNAL MEDICINE

## 2025-06-11 RX ORDER — AZELASTINE 1 MG/ML
1 SPRAY, METERED NASAL 2 TIMES DAILY
Qty: 90 ML | Refills: 3 | Status: SHIPPED | OUTPATIENT
Start: 2025-06-11 | End: 2026-06-11

## 2025-06-11 RX ORDER — VALSARTAN 80 MG/1
80 TABLET ORAL 2 TIMES DAILY
Qty: 180 TABLET | Refills: 3 | Status: SHIPPED | OUTPATIENT
Start: 2025-06-11

## 2025-06-11 RX ORDER — PREGABALIN 50 MG/1
100 CAPSULE ORAL 2 TIMES DAILY
Qty: 60 CAPSULE | Refills: 0 | Status: SHIPPED | OUTPATIENT
Start: 2025-06-11 | End: 2025-12-08

## 2025-06-11 NOTE — PROGRESS NOTES
"Subjective     Patient ID: Bhavna Landry is a 69 y.o. female.    Chief Complaint: Follow-up    HPI  Life "sucks"  - due to pain in lower back, top of leg.      To have SOPHY tomorrow.     Surgery out of the question.  Tramadol, hydrocodone, lidocaine not effective.    To have Privigen infusion soon.    Htn - elevated today.  Avg 141 /71 by dig htn.   No syncope or low BP.     Amlodipine 5 mg bid     [id number] mg  daily     Valsartan 80 mg bid.    Hyperlipidemia, tx with ator 80 mg    Asthma- symbicort bid, albuterol prn    Dr Poe managing depression.    Chronic anemia - taking I naida 1 tab daily.  Review of Systems   Constitutional:  Negative for fever and unexpected weight change.   HENT:  Negative for nasal congestion and postnasal drip.    Respiratory:  Negative for chest tightness, shortness of breath and wheezing.    Cardiovascular:  Negative for chest pain and leg swelling.   Gastrointestinal:  Negative for abdominal pain, anal bleeding, constipation, diarrhea, nausea and vomiting.   Genitourinary:  Negative for dysuria and urgency.   Integumentary:  Negative for rash.   Neurological:  Negative for headaches.   Psychiatric/Behavioral:  Negative for dysphoric mood and sleep disturbance. The patient is not nervous/anxious.           Objective     Physical Exam  Constitutional:       General: She is in acute distress (appears uncomfortable., in pain).      Appearance: She is well-developed. She is not ill-appearing, toxic-appearing or diaphoretic.   Eyes:      General: No scleral icterus.  Neck:      Thyroid: No thyromegaly.      Vascular: No JVD.   Cardiovascular:      Rate and Rhythm: Normal rate and regular rhythm.      Heart sounds: Normal heart sounds. No murmur heard.  Pulmonary:      Effort: Pulmonary effort is normal. No respiratory distress.      Breath sounds: Normal breath sounds. No stridor. No wheezing, rhonchi or rales.   Abdominal:      General: There is no distension.      Palpations: Abdomen " is soft. There is no mass.      Tenderness: There is no abdominal tenderness. There is no guarding.      Hernia: No hernia is present.   Musculoskeletal:      Cervical back: No rigidity or tenderness.      Right lower leg: No edema.      Left lower leg: No edema.   Lymphadenopathy:      Cervical: No cervical adenopathy.   Neurological:      Mental Status: She is alert and oriented to person, place, and time.   Psychiatric:         Mood and Affect: Mood normal.         Behavior: Behavior normal.         Thought Content: Thought content normal.            Assessment and Plan     1. Anemia, unspecified type  -     Iron and TIBC; Future; Expected date: 06/11/2025  -     Ferritin; Future; Expected date: 06/11/2025  -     Soluble Transferrin Receptor; Future; Expected date: 06/11/2025    2. Lumbosacral radiculopathy  -     pregabalin (LYRICA) 50 MG capsule; Take 2 capsules (100 mg total) by mouth 2 (two) times daily.  Dispense: 60 capsule; Refill: 0    3. Lumbar radiculopathy  -     pregabalin (LYRICA) 50 MG capsule; Take 2 capsules (100 mg total) by mouth 2 (two) times daily.  Dispense: 60 capsule; Refill: 0    4. Spinal stenosis of lumbar region with neurogenic claudication  -     pregabalin (LYRICA) 50 MG capsule; Take 2 capsules (100 mg total) by mouth 2 (two) times daily.  Dispense: 60 capsule; Refill: 0    5. Cervical radiculopathy  -     pregabalin (LYRICA) 50 MG capsule; Take 2 capsules (100 mg total) by mouth 2 (two) times daily.  Dispense: 60 capsule; Refill: 0    Other orders  -     valsartan (DIOVAN) 80 MG tablet; Take 1 tablet (80 mg total) by mouth 2 (two) times daily.  Dispense: 180 tablet; Refill: 3          Cc Gelter - rec on pain mangement.        Follow up in about 3 months (around 9/11/2025).

## 2025-06-11 NOTE — TELEPHONE ENCOUNTER
Refill Routing Note   Medication(s) are not appropriate for processing by [facility] for the following reason(s):        No active prescription written by provider    [facility] action(s):  Defer               Appointments  past 12m or future 3m with PCP    Date Provider   Last Visit   6/11/2025 Sadaf Vargas MD   Next Visit   Visit date not found Sadaf Vargas MD   ED visits in past 90 days: 0        Note composed:4:34 PM 06/11/2025

## 2025-06-11 NOTE — Clinical Note
Maile - she is quite uncomfortable.  I incr lyrica to 100 mg bid.  I would appreciate any thoughts you have on medication to help - tramadol and hydrocodone ineffective. Thanks Sadaf

## 2025-06-12 ENCOUNTER — HOSPITAL ENCOUNTER (OUTPATIENT)
Facility: HOSPITAL | Age: 70
Discharge: HOME | End: 2025-06-12
Attending: STUDENT IN AN ORGANIZED HEALTH CARE EDUCATION/TRAINING PROGRAM | Admitting: STUDENT IN AN ORGANIZED HEALTH CARE EDUCATION/TRAINING PROGRAM
Payer: MEDICARE

## 2025-06-12 VITALS
HEIGHT: 61 IN | SYSTOLIC BLOOD PRESSURE: 137 MMHG | RESPIRATION RATE: 16 BRPM | BODY MASS INDEX: 19.07 KG/M2 | HEART RATE: 63 BPM | OXYGEN SATURATION: 96 % | TEMPERATURE: 99 F | DIASTOLIC BLOOD PRESSURE: 63 MMHG | WEIGHT: 101 LBS

## 2025-06-12 DIAGNOSIS — M54.16 LUMBAR RADICULOPATHY: Primary | ICD-10-CM

## 2025-06-12 DIAGNOSIS — G89.29 CHRONIC PAIN: ICD-10-CM

## 2025-06-12 PROCEDURE — 62323 NJX INTERLAMINAR LMBR/SAC: CPT | Performed by: STUDENT IN AN ORGANIZED HEALTH CARE EDUCATION/TRAINING PROGRAM

## 2025-06-12 PROCEDURE — 63600175 PHARM REV CODE 636 W HCPCS: Performed by: STUDENT IN AN ORGANIZED HEALTH CARE EDUCATION/TRAINING PROGRAM

## 2025-06-12 PROCEDURE — 25500020 PHARM REV CODE 255: Performed by: STUDENT IN AN ORGANIZED HEALTH CARE EDUCATION/TRAINING PROGRAM

## 2025-06-12 PROCEDURE — 62323 NJX INTERLAMINAR LMBR/SAC: CPT | Mod: ,,, | Performed by: STUDENT IN AN ORGANIZED HEALTH CARE EDUCATION/TRAINING PROGRAM

## 2025-06-12 RX ORDER — LIDOCAINE HYDROCHLORIDE 10 MG/ML
INJECTION, SOLUTION EPIDURAL; INFILTRATION; INTRACAUDAL; PERINEURAL
Status: DISCONTINUED | OUTPATIENT
Start: 2025-06-12 | End: 2025-06-12 | Stop reason: HOSPADM

## 2025-06-12 RX ORDER — DEXAMETHASONE SODIUM PHOSPHATE 10 MG/ML
INJECTION, SOLUTION INTRA-ARTICULAR; INTRALESIONAL; INTRAMUSCULAR; INTRAVENOUS; SOFT TISSUE
Status: DISCONTINUED | OUTPATIENT
Start: 2025-06-12 | End: 2025-06-12 | Stop reason: HOSPADM

## 2025-06-12 RX ORDER — FENTANYL CITRATE 50 UG/ML
INJECTION, SOLUTION INTRAMUSCULAR; INTRAVENOUS
Status: DISCONTINUED | OUTPATIENT
Start: 2025-06-12 | End: 2025-06-12 | Stop reason: HOSPADM

## 2025-06-12 RX ORDER — MIDAZOLAM HYDROCHLORIDE 1 MG/ML
INJECTION INTRAMUSCULAR; INTRAVENOUS
Status: DISCONTINUED | OUTPATIENT
Start: 2025-06-12 | End: 2025-06-12 | Stop reason: HOSPADM

## 2025-06-12 RX ORDER — LIDOCAINE HYDROCHLORIDE 20 MG/ML
INJECTION, SOLUTION EPIDURAL; INFILTRATION; INTRACAUDAL; PERINEURAL
Status: DISCONTINUED | OUTPATIENT
Start: 2025-06-12 | End: 2025-06-12 | Stop reason: HOSPADM

## 2025-06-12 NOTE — DISCHARGE SUMMARY
Discharge Note  Short Stay      SUMMARY     Admit Date: 6/12/2025    Attending Physician: Maile Storm      Discharge Physician: Maile Storm      Discharge Date: 6/12/2025 12:59 PM    Procedure(s) (LRB):  Caudal SOPHY (N/A)    Final Diagnosis: Lumbar radiculopathy [M54.16]    Disposition: Home or self care    Patient Instructions:   Current Discharge Medication List        CONTINUE these medications which have NOT CHANGED    Details   acetaminophen (TYLENOL) 500 MG tablet Take 1 tablet (500 mg total) by mouth every 6 (six) hours as needed.      albuterol (PROVENTIL/VENTOLIN HFA) 90 mcg/actuation inhaler USE 2 INHALATIONS BY MOUTH 4  TIMES DAILY AS NEEDED  Qty: 25.5 g, Refills: 3    Comments: Please send a replace/new response with 90-Day Supply if appropriate to maximize member benefit. Requesting 1 year supply.      amLODIPine (NORVASC) 5 MG tablet Take 1 tablet (5 mg total) by mouth 2 (two) times a day.  Qty: 180 tablet, Refills: 3    Comments: .  Associated Diagnoses: Essential hypertension      ascorbic acid, vitamin C, (VITAMIN C) 500 MG tablet Take 500 mg by mouth once daily.      aspirin (ECOTRIN) 81 MG EC tablet Take 81 mg by mouth once daily.      atorvastatin (LIPITOR) 80 MG tablet Take 1 tablet (80 mg total) by mouth once daily.  Qty: 90 tablet, Refills: 3    Associated Diagnoses: Hyperlipidemia, unspecified hyperlipidemia type      azelastine (ASTELIN) 137 mcg (0.1 %) nasal spray 1 spray (137 mcg total) by Nasal route 2 (two) times daily.  Qty: 90 mL, Refills: 3    Associated Diagnoses: Allergic rhinitis, unspecified seasonality, unspecified trigger      budesonide-formoterol 80-4.5 mcg (SYMBICORT) 80-4.5 mcg/actuation HFAA Inhale 2 puffs into the lungs 2 (two) times a day. Controller  Qty: 10.2 g, Refills: 11      fluticasone propionate (FLONASE) 50 mcg/actuation nasal spray 1 SPRAY EACH NOSTRIL EVERY DAY  Qty: 32 g, Refills: 2    Associated Diagnoses: Acute bacterial sinusitis      folic acid  (FOLVITE) 1 MG tablet Take 1 tablet (1,000 mcg total) by mouth once daily.  Qty: 90 tablet, Refills: 3    Comments: Requesting 1 year supply  Associated Diagnoses: Psoriatic arthritis      HYDROcodone-acetaminophen (NORCO) 5-325 mg per tablet Take 1 tablet by mouth every 8 (eight) hours as needed for Pain.  Qty: 90 tablet, Refills: 0    Comments: Quantity prescribed more than 7 day supply? Yes, quantity medically necessary  Associated Diagnoses: Chronic low back pain without sciatica, unspecified back pain laterality      immun glob G,IgG,-pro-IgA 0-50 (HIZENTRA) 4 gram/20 mL (20 %) Soln Inject 60 mLs (12 g total) into the skin every 14 (fourteen) days.  Qty: 120 mL, Refills: 11    Associated Diagnoses: CVID (common variable immunodeficiency)      !! isosorbide mononitrate (IMDUR) 30 MG 24 hr tablet TAKE 1 TABLET(30 MG) BY MOUTH DAILY  Qty: 90 tablet, Refills: 3      !! isosorbide mononitrate (IMDUR) 30 MG 24 hr tablet Take 1 tablet (30 mg total) by mouth once daily.  Qty: 90 tablet, Refills: 3    Associated Diagnoses: Essential hypertension      ketoconazole (NIZORAL) 2 % shampoo Wash hair with medicated shampoo at least 2x/month - let sit on scalp at least 5 minutes prior to rinsing  Qty: 120 mL, Refills: 5    Associated Diagnoses: Alopecia      methotrexate 2.5 MG Tab Take 8 tablets (20 mg total) by mouth every 7 days. TAKE 4 TABLETS MONDAY MORNING AND TAKE 4 TABLETS MONDAY NIGHT ONLY  Qty: 96 tablet, Refills: 0    Comments: **Patient requests 90 days supply**  Associated Diagnoses: Psoriatic arthritis      omega-3 fatty acids/fish oil (FISH OIL-OMEGA-3 FATTY ACIDS) 300-1,000 mg capsule Take by mouth once daily.      omeprazole (PRILOSEC) 40 MG capsule TAKE 1 CAPSULE(40 MG) BY MOUTH EVERY MORNING  Qty: 90 capsule, Refills: 3    Associated Diagnoses: Gastroesophageal reflux disease, unspecified whether esophagitis present      oxyCODONE (ROXICODONE) 5 MG immediate release tablet Take 1 tablet (5 mg total) by mouth  every 12 (twelve) hours as needed.  Qty: 10 tablet, Refills: 0    Comments: Quantity prescribed more than 7 day supply? No  Associated Diagnoses: Carotid artery stenosis, asymptomatic, right      pregabalin (LYRICA) 50 MG capsule Take 2 capsules (100 mg total) by mouth 2 (two) times daily.  Qty: 60 capsule, Refills: 0    Associated Diagnoses: Lumbosacral radiculopathy; Lumbar radiculopathy; Spinal stenosis of lumbar region with neurogenic claudication; Cervical radiculopathy      !! risperiDONE (RISPERDAL) 2 MG tablet TAKE 1 TABLET(2 MG) BY MOUTH EVERY MORNING  Qty: 90 tablet, Refills: 3    Associated Diagnoses: Paranoid schizophrenia      !! risperiDONE (RISPERDAL) 4 MG tablet Take 1 tablet (4 mg total) by mouth every evening.  Qty: 90 tablet, Refills: 3    Associated Diagnoses: Paranoid schizophrenia      traMADoL (ULTRAM) 50 mg tablet Take 1 tablet (50 mg total) by mouth every 8 (eight) hours as needed for Pain.  Qty: 90 tablet, Refills: 0    Comments: Quantity prescribed more than 7 day supply? 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  Qty: 180 tablet, Refills: 3    Comments: .      vitamin D (VITAMIN D3) 1000 units Tab Take 1,000 Units by mouth once daily.      vitamin E 200 UNIT capsule Take 200 Units by mouth once daily.      zinc gluconate 50 mg tablet Take 50 mg by mouth once daily.      albuterol (PROVENTIL) 2.5 mg /3 mL (0.083 %) nebulizer solution Take 3 mLs (2.5 mg total) by nebulization every 6 (six) hours as needed for Wheezing. Rescue  Qty: 90 each, Refills: 3    Associated Diagnoses: Chronic bronchitis, unspecified chronic bronchitis type      exemestane (AROMASIN) 25 mg tablet Take 1 tablet (25 mg total) by mouth once daily.  Qty: 30 tablet, Refills: 11    Associated Diagnoses: Carcinoma of upper-outer  quadrant of right breast in female, estrogen receptor positive       !! - Potential duplicate medications found. Please discuss with provider.              Discharge Diagnosis: Lumbar radiculopathy [M54.16]  Condition on Discharge: Stable with no complications to procedure   Diet on Discharge: Same as before.  Activity: as per instruction sheet.  Discharge to: Home with a responsible adult.  Follow up: 2-4 weeks       Please call my office or pager at [phone] if experienced any weakness or loss of sensation, fever > 101.5, pain uncontrolled with oral medications, persistent nausea/vomiting/or diarrhea, redness or drainage from the incisions, or any other worrisome concerns. If physician on call was not reached or could not communicate with our office for any reason please go to the nearest emergency department

## 2025-06-12 NOTE — OP NOTE
Caudal Epidural Steroid Injection with Catheter under Fluoroscopic Guidance    The procedure, risks, benefits, and options were discussed with the patient. There are no contraindications to the procedure. The patent expressed understanding and agreed to the procedure. Informed written consent was obtained prior to the start of the procedure and can be found in the patient's chart.    PATIENT NAME: Bhavna Landry   MRN: [id number]     DATE OF PROCEDURE: 06/12/2025    PROCEDURE: Caudal Epidural Steroid Injection under Fluoroscopic Guidance    PRE-OP DIAGNOSIS: Lumbar radiculopathy [M54.16] Lumbar radiculopathy [M54.16]    POST-OP DIAGNOSIS: Same    PHYSICIAN: Maile Storm DO    ASSISTANTS: None     MEDICATIONS INJECTED: Preservative-free Decadron 10mg with 4cc of Lidocaine 1% MPF and PFNS    LOCAL ANESTHETIC INJECTED: Xylocaine 2%     SEDATION: Versed 2mg and Fentanyl 50mcg                                                                                                                                                                                     Conscious sedation ordered by M.D. Patient re-evaluation prior to administration of conscious sedation. No changes noted in patient's status from initial evaluation. The patient's vital signs were monitored by RN and patient remained hemodynamically stable throughout the procedure.    Event Time In   Sedation Start 1248   Sedation End 1257       ESTIMATED BLOOD LOSS: None    COMPLICATIONS: None    TECHNIQUE: Time-out was performed to identify the patient and procedure to be performed. With the patient laying in a prone position, the surgical area was prepped and draped in the usual sterile fashion using ChloraPrep and a fenestrated drape. The injection site was determined under fluoroscopy guidance. Skin anesthesia was achieved by injecting Lidocaine 2% over the injection site. The sacrum and sacral cornua were then approached with a 16 gauge, 3.5 inch epidural needle  that was introduced and advanced into the sacral hiatus under fluoroscopic guidance with AP, lateral and/or contralateral oblique imaging. After the needle passed through the sacrococcygeal ligament, the needle angle was lowered and the needle was advanced 1 cm. After negative aspiration of blood or CSF, and no evidence of paraesthesias, the catheter was threaded through the needle into the epidural space using live fluoroscopy, contrast dye Omnipaque (300mg/mL) was injected to confirm placement and there was no vascular runoff. Fluoroscopic imaging in the AP and lateral views revealed a clear outline of the spinal nerve with proximal spread of agent through the caudal epidural space. Then 8 mL of the medication mixture listed above was injected slowly. Displacement of the radio opaque contrast after injection of the medication confirmed that the medication went into the area of the transforaminal spaces. The needles were removed and bleeding was nil. A sterile dressing was applied. No specimens collected. The patient tolerated the procedure well.       The patient was monitored after the procedure in the recovery area. They were given post-procedure and discharge instructions to follow at home. The patient was discharged in a stable condition.    Maile Storm DO

## 2025-06-12 NOTE — PLAN OF CARE
Discharge instructions given and explained to patient and family with verbalization of understanding all instructions. Patients v/s stable, denies n/v and tolerating po, denies pain, IV removed, and pt transported to car via wheelchair for D/C home.

## 2025-06-12 NOTE — H&P
HPI  Patient presenting for Procedure(s) (LRB):  Caudal SOPHY (N/A)     Patient on Anti-coagulation No    No health changes since previous encounter    Past Medical History:   Diagnosis Date    Allergy     Amblyopia     Anemia     Anticoagulant long-term use     Arthritis 02/02/1992    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, estrogen receptor positive 04/13/2022    Cataract     Depression     Dry eyes     Dry mouth     Duane's syndrome of right eye     Early dry stage nonexudative age-related macular degeneration of both eyes 09/20/2022    Fibromyalgia 04/17/2014    Fibromyalgia     Fractured hip     RIGHT HIP    GERD (gastroesophageal reflux disease)     History of psychiatric hospitalization     Hyperlipidemia 02/02/1992    Hypertension     Hypocalcemia     Hyponatremia     Kidney stone     Migraine headache     Osteoporosis     Pressure ulcer of unspecified site, unspecified stage     Psoriatic arthritis 02/02/1992    Recurrent upper respiratory infection (URI)     Right knee pain     post knee replacement surgery (possible rejectiion of metal)    RLS (restless legs syndrome)     Schizophrenia 02/02/1992    stable on meds    Sciatica     Squamous cell carcinoma of skin     Urinary tract infection      Past Surgical History:   Procedure Laterality Date    brow ptosis repair Right 03/04/2019    Surgeon: Dr. Karla Henson    CAROTID ENDARTERECTOMY Right 2/19/2025    Procedure: ENDARTERECTOMY-CAROTID;  Surgeon: SLIME Johnson III, MD;  Location: [facility] OR [address] [place] [address];  Service: Vascular;  Laterality: Right;    CATARACT EXTRACTION      CAUDAL EPIDURAL STEROID INJECTION N/A 9/18/2024    Procedure: Caudal SOPHY;  Surgeon: Maile Storm DO;  [facility];  Service: Pain Management;  Laterality: N/A;  ASA ok    CAUDAL EPIDURAL STEROID INJECTION N/A 1/29/2025    Procedure: Caudal SOPHY;  Surgeon: Maile Storm DO;  [facility];  Service: Pain Management;  Laterality:  N/A;  asa ok     SECTION      COLONOSCOPY N/A 2016    Procedure: COLONOSCOPY;  Surgeon: ANDRIA Connelly MD;  [facility]  Service: Endoscopy;  Laterality: N/A;    COLONOSCOPY N/A 2022    Procedure: COLONOSCOPY;  Surgeon: Mikey Walters MD;  Location: [facility];  Service: Endoscopy;  Laterality: N/A;    cyst removed from right sinus  1982    EPIDURAL STEROID INJECTION INTO CERVICAL SPINE N/A 2023    Procedure: C6-7 SOPHY;  Surgeon: Spring Jensen MD;  Location: [facility] PAIN MANAGEMENT;  Service: Pain Management;  Laterality: N/A;  20 mins    EPIDURAL STEROID INJECTION INTO LUMBAR SPINE N/A 2023    Procedure: Injection-steroid-epidural-lumbar L5-S1;  Surgeon: Chirag Kim MD;  Location: [facility] PAIN MANAGEMENT;  Service: Pain Management;  Laterality: N/A;  asa    EPIDURAL STEROID INJECTION INTO LUMBAR SPINE N/A 2024    Procedure: L5/S1 Interlaminar SOPHY;  Surgeon: Maile Storm DO;  Location: [facility] PAIN MANAGEMENT;  Service: Pain Management;  Laterality: N/A;  20mins-ASA 5d    ESOPHAGOGASTRODUODENOSCOPY N/A 2023    Procedure: EGD (ESOPHAGOGASTRODUODENOSCOPY);  Surgeon: Dougie Shea MD;  Location: [facility];  Service: Endoscopy;  Laterality: N/A;    FOOT FRACTURE SURGERY      HYSTERECTOMY      VAGINAL HYSTERECTOMY WITHOUT BSO - ENDOMETRIOSIS    INJECTION FOR SENTINEL NODE IDENTIFICATION Right 2022    Procedure: INJECTION, FOR SENTINEL NODE IDENTIFICATION-Right;  Surgeon: NEAL Dhillon MD;  Location: [facility] OR;  Service: General;  Laterality: Right;    INJECTION OF ANESTHETIC AGENT AROUND MEDIAL BRANCH NERVES INNERVATING LUMBAR FACET JOINT N/A 2019    Procedure: Lumbo-sacral Block, DR5 and Lateral Branches of S1,S2, S3;  Surgeon: Michelle Pineda Jr., MD;  Location: [facility] PAIN MGT;  Service: Pain Management;  Laterality: N/A;  Pt takes  and states she holds ASA on her own whenever she has procedures.  Instructed to hold x 3 days  prior to procedure.      INJECTION OF ANESTHETIC AGENT AROUND MEDIAL BRANCH NERVES INNERVATING LUMBAR FACET JOINT Bilateral 05/03/2023    Procedure: Block-nerve-medial branch-lumbar bilateral L3, L4, L5;  Surgeon: Maile Storm DO;  Location: [facility] PAIN [facility];  Service: Pain Management;  Laterality: Bilateral;  asa    INJECTION OF ANESTHETIC AGENT INTO SACROILIAC JOINT Right 08/30/2018    Procedure: BLOCK, SACROILIAC JOINT-Right- ORAL SEDATION;  Surgeon: Michelle Pineda Jr., MD;  Location: [facility] PAIN [facility];  Service: Pain Management;  Laterality: Right;    INJECTION OF ANESTHETIC AGENT INTO SACROILIAC JOINT Bilateral 09/27/2018    Procedure: BLOCK, SACROILIAC JOINT-BILATERAL;  Surgeon: Michelle Pineda Jr., MD;  Location: [facility];  Service: Pain Management;  Laterality: Bilateral;  Please keep at 10:00 due to trasnsportation    INJECTION OF ANESTHETIC AGENT INTO SACROILIAC JOINT Bilateral 02/07/2019    Procedure: Bilateral Sacroiliac Joint Injection - Per Dr Pineda, not necessary to hold ASA.;  Surgeon: Michelle Pineda Jr., MD;  Location: [facility];  Service: Pain Management;  Laterality: Bilateral;    INJECTION, SPINE, LUMBOSACRAL, TRANSFORAMINAL APPROACH Right 7/17/2024    Procedure: Right  L3/4  and L4/5 TFESI;  Surgeon: Maile Storm DO;  Location: [facility] PAIN MANAGEMENT;  Service: Pain Management;  Laterality: Right;  20 mins  ASA 5 days    INTRAOCULAR PROSTHESES INSERTION Right 08/01/2019    Procedure: INSERTION, IOL PROSTHESIS;  Surgeon: Karen Song MD;  Location: [address];  Service: Ophthalmology;  Laterality: Right;    INTRAOCULAR PROSTHESES INSERTION Left 09/26/2019    Procedure: INSERTION, IOL PROSTHESIS;  Surgeon: Karen Song MD;  Location: [address];  Service: Ophthalmology;  Laterality: Left;    JOINT REPLACEMENT Right     knee    KNEE SURGERY      MASTECTOMY Right 05/09/2022    Procedure: MASTECTOMY-Right;  Surgeon: NEAL Dhillon MD;  Location: [facility] OR;   Service: General;  Laterality: Right;  2.5 HOURS    PHACOEMULSIFICATION OF CATARACT Right 08/01/2019    Procedure: PHACOEMULSIFICATION, CATARACT;  Surgeon: Karen Song MD;  Location: [facility] OR [address] [place] [address];  Service: Ophthalmology;  Laterality: Right;    PHACOEMULSIFICATION OF CATARACT Left 09/26/2019    Procedure: PHACOEMULSIFICATION, CATARACT;  Surgeon: Karen Song MD;  Location: [facility] OR [address] [place] [address];  Service: Ophthalmology;  Laterality: Left;    RADIOFREQUENCY ABLATION, NERVE, PERIPHERAL Right 04/24/2024    Procedure: RIGHT L5 Dorsal Ramus and RIGHT Lateral Branches of S1, S2, and S3;  Surgeon: Maile Storm DO;  Location: [facility] PAIN MANAGEMENT;  Service: Pain Management;  Laterality: Right;  30 mins    RADIOFREQUENCY THERMOCOAGULATION Right 05/07/2019    Procedure: RADIOFREQUENCY THERMAL COAGULATION RIGHT DORSAL RAMUS 5 AND LATERAL BRANCH OF S1, S2 AND S3;  Surgeon: Michelle Pineda Jr., MD;  Location: [facility];  Service: Pain Management;  Laterality: Right;    RADIOFREQUENCY THERMOCOAGULATION Left 05/14/2019    Procedure: RADIOFREQUENCY THERMAL COAGULATION LEFT DORSAL RAMUS 5 AND LATERAL BRANCH OF S1,S2 AND S3;  Surgeon: Michelle Pineda Jr., MD;  Location: [facility];  Service: Pain Management;  Laterality: Left;    RADIOFREQUENCY THERMOCOAGULATION Right 10/22/2019    Procedure: RADIOFREQUENCY THERMAL COAGULATION---DARSAL RAMUS 5 and LATERAL S1,S2,and S3 Right;  Surgeon: Michelle Pineda Jr., MD;  Location: [facility] PAIN [facility] [place];  Service: Pain Management;  Laterality: Right;  patient to sign consent DOS    RADIOFREQUENCY THERMOCOAGULATION Left 10/29/2019    Procedure: RADIOFREQUENCY THERMAL COAGULATION - LEFT - DR5, S1,S2, AND S3;  Surgeon: Michelle Pineda Jr., MD;  Location: [facility];  Service: Pain Management;  Laterality: Left;    RADIOFREQUENCY THERMOCOAGULATION Left 05/26/2020    Procedure: RADIOFREQUENCY THERMAL COAGULATION--Left DR5+ lateral branches of S1, S2, S3;  Surgeon: Michelle MERRITT  Edwin Bergman MD;  Location: [facility] PAIN T;  Service: Pain Management;  Laterality: Left;    RADIOFREQUENCY THERMOCOAGULATION Right 06/02/2020    Procedure: RADIOFREQUENCY THERMAL COAGULATION--Right DR5+ lateral branches of S1, S2, S3;  Surgeon: Michelle Pineda Jr., MD;  Location: [facility] PAIN T;  Service: Pain Management;  Laterality: Right;    SENTINEL LYMPH NODE BIOPSY Right 05/09/2022    Procedure: BIOPSY, LYMPH NODE, SENTINEL-Right;  Surgeon: NEAL Dhillon MD;  Location: [facility];  Service: General;  Laterality: Right;    SINUS SURGERY      Surgery on right knee  07/09/1982    TONSILLECTOMY      tumor removed from back left side upper shoulder  03/17/2006     Review of patient's allergies indicates:   Allergen Reactions    Etanercept Other (See Comments)     Other reaction(s): recurrent infections    Chloramphenicol sod succinate Hives    Codeine Other (See Comments)     Other reaction(s): Stomach upset. Pt states OK with Percocet    Nickel sutures [surgical stainless steel] Dermatitis     Allergic contact dermatitis    Adhesive Rash      No current facility-administered medications for this encounter.     Facility-Administered Medications Ordered in Other Encounters   Medication    tropicamide 1% ophthalmic solution 1 drop       PMHx, PSHx, Allergies, Medications reviewed in epic    ROS negative except pain complaints in HPI    OBJECTIVE:    Breastfeeding No     PHYSICAL EXAMINATION:    GENERAL: Well appearing, in no acute distress, alert and oriented x3.  PSYCH:  Mood and affect appropriate.  SKIN: Skin color, texture, turgor normal, no rashes or lesions which will impact the procedure.  CV: RRR with palpation of the radial artery.  PULM: No evidence of respiratory difficulty, symmetric chest rise. Clear to auscultation.  NEURO: Cranial nerves grossly intact.    Plan:    Proceed with procedure as planned Procedure(s) (LRB):  Caudal SOPHY (N/A)    Maile Storm  06/12/2025

## 2025-06-12 NOTE — DISCHARGE INSTRUCTIONS
[facility]  Dr. Maile Storm  Messaging service # [phone]    POST-PROCEDURE INSTRUCTIONS:    Today you had an injection that included a steroid medications.  The steroid may or may not have been mixed with a local anesthetic when it was injected.   If the injection was in the neck, you may feel some pressure, numbness, or slight weakness in the arm after the procedure for a short period of time (this is a normal response), if this persists for longer than 1 day please contact our office or go to the emergency room.  If the injection was in the low back, you may feel some pressure, numbness, or slight weakness in the leg after the procedure for a short period of time (this is a normal response), if this persists for longer than 1 day please contact our office or go to the emergency room.  You may get side effects from the steroid.  This is not uncommon.  Symptoms include: elevated blood sugar, elevated blood pressure, headache, flushing, nausea, insomnia.  These symptoms are transient and will resolve within 1-3 days.  If symptoms last longer than this please contact our office or head to the emergency room.  Steroid medications can take anywhere from 3-14 days to take effect (rarely longer).  You may notice that your pain worsens for a short period of time after the injection, this would not be unusual due to the pressure and trauma from the needle.    If you do not have a follow up appointment scheduled, please contact my office (or the office of the physician who referred you for the procedure) to get a post-procedure follow up scheduled 2-4 weeks after the procedure.  This can be done as a virtual visit if that is more convenient for you.      What you need to do:    Keep a record of your response to the injection you had today.    How much relief did you get?   When did the relief start and how long did it last?  Were you able to decrease the use of any of your pain  medications?  Were you able to increase your level of activity?  How long did the relief last?    What to watch out for:    If you experience any of the following symptoms after your procedure, please notify the messaging service immediately (see above for contact information):   fever (increased oral temperature)   bleeding or swelling at the injection site,    drainage, rash or redness at the injection site    possible signs of infection    increased pain at the injection site   worsening of your usual pain   severe headache   new or worsening numbness    new arm and/or leg weakness, or    changes in bowel and/or bladder function: urinating or defecating on yourself and not knowing that you did it.    PLEASE FOLLOW ALL INSTRUCTIONS CAREFULLY     Do not engage in strenuous activity (e.g., lifting or pushing heavy objects or repeated bending) for 24 hours.     Do not take a bath, swim or use Jacuzzi for 24 hours after procedure. (A shower is fine).   Remove any Band-Aids when you get home.    Use cold/ice, as needed for comfort.  We recommend the use of cold therapy alternating on for 20 minutes, off for 20 minutes.    Do not apply direct heat (heating pad or heat packs) to the injection site for 24 hours.     Resume your usual medications, unless instructed otherwise by your Pain Physician.     If you are on warfarin (Coumadin) or other blood thinner, resume this medication as instructed by your prescribing Physician.    IF AT ANY POINT YOU ARE VERY CONCERNED ABOUT YOUR SYMPTOMS, PLEASE GO TO THE EMERGENCY ROOM.    If you develop worsening pain, weakness, numbness, lose bowel or bladder control (i.e., having an accident where you did not even know you had to go to the bathroom and suddenly noticed you soiled yourself), saddle anesthesia (a loss of sensation restricted to the area of the buttocks, anus and between the legs -- i.e., those parts of your body that would touch a saddle if you were sitting on one) you  need to go immediately to the emergency department for evaluation and treatment.    ----------------------------------------------------------------------------------------------------------------------------------------------------------------  If you received Sedation please read the following instructions:  POST SEDATION INSTRUCTIONS    Today you received intravenous medication (also known as sedation) that was used to help you relax and/or decrease discomfort during your procedure. This medication will be acting in your body for the next 24 hours, so you might feel a little tired or sleepy. This feeling will slowly wear off.   Common side effects associated with these medications include: drowsiness, dizziness, sleepiness, confusion, feeling excited, difficulty remembering things, lack of steadiness with walking or balance, loss of fine muscle control, slowed reflexes, difficulty focusing, and blurred vision.  Some over-the-counter and prescription medications (e.g., muscle relaxants, opioids, mood-altering medications, sedatives/hypnotics, antihistamines) can interact with the intravenous medication you received and cause an increased risk of the side effects listed above in addition to other potentially life threatening side effects. Use extreme caution if you are taking such medications, and consult with your Pain Physician or prescribing physician if you have any questions.  For the next 12-24 hours:    DO NOT--Drive a car, operate machinery or power tools   DO NOT--Drink any alcoholic beverages (not even beer), they may dangerously increase the risk of side effects.    DO NOT--Make any important legal or business decisions or sign important documents.  We advise you to have someone to assist you at home. Move slowly and carefully. Do not make sudden changes in position. Be aware of dizziness or light-headedness and move accordingly.   If you seek medical treatment within 24 hours, let the nurse or doctor  caring for you know that you have received the above medications. If you have any questions or concerns related to your sedation or treatment today please contact us.

## 2025-06-14 DIAGNOSIS — L40.50 PSORIATIC ARTHRITIS: ICD-10-CM

## 2025-06-16 ENCOUNTER — CLINICAL SUPPORT (OUTPATIENT)
Dept: ENDOSCOPY | Facility: HOSPITAL | Age: 70
End: 2025-06-16
Attending: INTERNAL MEDICINE
Payer: MEDICARE

## 2025-06-16 ENCOUNTER — PATIENT MESSAGE (OUTPATIENT)
Dept: ENDOSCOPY | Facility: HOSPITAL | Age: 70
End: 2025-06-16

## 2025-06-16 ENCOUNTER — TELEPHONE (OUTPATIENT)
Dept: ENDOSCOPY | Facility: HOSPITAL | Age: 70
End: 2025-06-16

## 2025-06-16 VITALS — BODY MASS INDEX: 19.07 KG/M2 | HEIGHT: 61 IN | WEIGHT: 101 LBS

## 2025-06-16 DIAGNOSIS — Z86.0101 HISTORY OF ADENOMATOUS POLYP OF COLON: ICD-10-CM

## 2025-06-16 DIAGNOSIS — Z12.11 COLON CANCER SCREENING: ICD-10-CM

## 2025-06-16 RX ORDER — METHOTREXATE 2.5 MG/1
TABLET ORAL
Qty: 96 TABLET | Refills: 0 | Status: SHIPPED | OUTPATIENT
Start: 2025-06-16

## 2025-06-16 RX ORDER — POLYETHYLENE GLYCOL 3350, SODIUM SULFATE ANHYDROUS, SODIUM BICARBONATE, SODIUM CHLORIDE, POTASSIUM CHLORIDE 236; 22.74; 6.74; 5.86; 2.97 G/4L; G/4L; G/4L; G/4L; G/4L
4 POWDER, FOR SOLUTION ORAL ONCE
Qty: 4000 ML | Refills: 0 | Status: SHIPPED | OUTPATIENT
Start: 2025-06-16 | End: 2025-06-16

## 2025-06-16 NOTE — PATIENT INSTRUCTIONS
Colonoscopy Procedure Prep Instructions    Date of procedure: 8/4/25 Arrive at: 10:30 AM    Location of Department:   [facility] [address] [place] [address]   Stop in the hospital lobby on the 1st floor to get registered, then take the hospital elevators to the 2nd floor waiting room    As soon as possible:   your prep from pharmacy and over the counter DULCOLAX LAXATIVE TABLETS            On the day before your procedure   What You CAN do:   You may have clear liquids ONLY-see below for list.     Liquids That Are OK to Drink:   Water  Sports drinks (Gatorade, Power-Aid)  Coffee or tea (no cream or nondairy creamer)  Clear juices without pulp (apple, white grape)  Gelatin desserts (no fruit or toppings)  Clear soda (sprite, coke, ginger ale)  Chicken broth (until 12 midnight the night before procedure)    What You CANNOT do:   Do not EAT solid food, drink milk or anything   colored red.  Do not drink alcohol.  Do not take oral medications within 1 hour of starting   each dose of prep.  No gum chewing or candy morning of procedure                       Note:   (Please disregard the insert instructions from pharmacy).  PEG Bowel Prep is indicated for cleansing of the colon as a preparation for colonoscopy in adults.   Be sure to tell your doctor about all the medicines you take, including prescription and non-prescription medicines, vitamins, and herbal supplements. PEG Bowel Prep may affect how other medicines work.  Medication taken by mouth may not be absorbed properly when taken within 1 hour before the start of each dose of PEG Bowel Prep.    It is not uncommon to experience some abdominal cramping, nausea and/or vomiting when taking the prep. If you have nausea and/or vomiting while taking the prep, stop drinking for 20 to 30 minutes then continue.      How to take prep:    PEG Bowel Prep is a (2-day) prep.    One (1) bottle of prep are required for a complete preparation for  colonoscopy. Dilute the solution concentrate as directed prior to use. You must drink water with each dose of prep, and additional water after each dose.    DOSE 1--Day Before Colonoscopy 8/3/25     Drink at least 6 to 8 glasses of clear liquids from time you wake up until you begin your prep and then continue until bedtime to avoid dehydration.     12:00 pm (NOON) Mix your entire container of prep with lukewarm water and refrigerate. Take four (4) Dulcolax (Bisacodyl) tablets with at least 8 ounces or more of clear liquids.       6:00 pm:    You must complete Steps 1 and 2 below before going to bed:    Step 1-Drink half the liquid in the container within one (1) hour.   Step 2-Refrigerate the remaining half of the liquid for dose 2. See below when to begin this step.                       IMPORTANT: If you experience preparation-related symptoms (for example, nausea, bloating, or cramping), stop, or slow the rate of drinking the additional water until your symptoms decrease.    DOSE 2--Day of the Colonoscopy 8/4/25 at 2-3 AM.    For this dose, repeat Step 1 shown above using the remaining half of the liquid prep.   You may continue drinking water/clear liquids until   4 hours before your colonoscopy or as directed by the scheduling nurse  7:30 AM.      For information about your procedure, two (2) things to view prior to colonoscopy:  Please watch this informational video. It is important to watch this animated consent video prior to your arrival. If you haven't watched the video prior to arriving, you are required to watch it during admission which can causes delays.    Options for viewing:   Using a keyboard:  press and hold the control tab (Ctrl) and left mouse click to follow links.           Colonoscopy Instructional Video                                                                                   OR    Type link address into your web browser's address  bar:  [email or web address]      Educational Booklet with pictures:      Colonoscopy Prep - Liquid      Comments:        IMPORTANT INFORMATION TO KNOW BEFORE YOUR PROCEDURE    [facility] 2nd Floor         If your procedure requires the administration of anesthesia, it is necessary for a responsible adult to drive you home. (Medical Transportation, Uber, Lyft, Taxi, etc. may ONLY be used if a responsible adult is present to accompany you home.  The responsible adult CAN'T be the  of the service).      person must be available to return to pick you up within 15 minutes of being notified of discharge.       Please bring a picture ID, insurance card, & copayment      Take Medications as directed below:        If you begin taking any blood thinning medications, injectable weight loss/diabetes medications (other than insulin) , Adipex (Phentermine) , please contact the endoscopy scheduling department listed below as soon as possible.    If you are diabetic see the attached instruction sheet regarding your medication.     If you take HEART, BLOOD PRESSURE, SEIZURE, PAIN, LUNG (including inhalers/nebulizers), ANTI-REJECTION (transplant patients), or PSYCHIATRIC medications, please take at your regular times with a sip of water or as directed by the scheduling nurse.     Important contact information:    Endoscopy [phone] Hours of operation Monday-Friday 8:00-4:30pm.    Questions about insurance or financial obligations call [phone] or [phone].    If you have questions regarding the prep or need to reschedule, please call [phone]. After hours questions requiring immediate assistance, contact [facility] nurse line at [phone] or [phone].   NOTE:     On occasion, unforeseen circumstances may cause a delay in your procedure start time. We respect your time and appreciate your patience during these  circumstances.      Comments:

## 2025-06-16 NOTE — TELEPHONE ENCOUNTER
Patient called to schedule Colonoscopy. Patient is requesting an EGD as well to check for gastric ulcers. Please place order or referral for GI clinic if appropriate. Thanks.

## 2025-06-16 NOTE — PLAN OF CARE
Patient is scheduled for a Colonoscopy on 8/4/25 with Dr. ANDERE Shea  Referral for procedure from PAT appointment

## 2025-06-17 ENCOUNTER — INFUSION (OUTPATIENT)
Dept: INFECTIOUS DISEASES | Facility: HOSPITAL | Age: 70
End: 2025-06-17
Payer: MEDICARE

## 2025-06-17 VITALS
WEIGHT: 102.94 LBS | TEMPERATURE: 98 F | RESPIRATION RATE: 18 BRPM | OXYGEN SATURATION: 95 % | HEART RATE: 61 BPM | BODY MASS INDEX: 19.43 KG/M2 | HEIGHT: 61 IN | DIASTOLIC BLOOD PRESSURE: 54 MMHG | SYSTOLIC BLOOD PRESSURE: 100 MMHG

## 2025-06-17 DIAGNOSIS — D83.9 COMMON VARIABLE IMMUNODEFICIENCY: Primary | ICD-10-CM

## 2025-06-17 DIAGNOSIS — J30.9 ALLERGIC RHINITIS, UNSPECIFIED SEASONALITY, UNSPECIFIED TRIGGER: ICD-10-CM

## 2025-06-17 PROCEDURE — 96366 THER/PROPH/DIAG IV INF ADDON: CPT

## 2025-06-17 PROCEDURE — 25000003 PHARM REV CODE 250: Performed by: STUDENT IN AN ORGANIZED HEALTH CARE EDUCATION/TRAINING PROGRAM

## 2025-06-17 PROCEDURE — 63600175 PHARM REV CODE 636 W HCPCS: Mod: JZ,TB | Performed by: STUDENT IN AN ORGANIZED HEALTH CARE EDUCATION/TRAINING PROGRAM

## 2025-06-17 PROCEDURE — 96365 THER/PROPH/DIAG IV INF INIT: CPT

## 2025-06-17 RX ORDER — DIPHENHYDRAMINE HYDROCHLORIDE 50 MG/ML
25 INJECTION, SOLUTION INTRAMUSCULAR; INTRAVENOUS
Status: DISCONTINUED | OUTPATIENT
Start: 2025-06-17 | End: 2025-06-17 | Stop reason: HOSPADM

## 2025-06-17 RX ORDER — EPINEPHRINE 0.3 MG/.3ML
0.3 INJECTION SUBCUTANEOUS ONCE AS NEEDED
OUTPATIENT
Start: 2025-07-15

## 2025-06-17 RX ORDER — HEPARIN 100 UNIT/ML
500 SYRINGE INTRAVENOUS
Status: DISCONTINUED | OUTPATIENT
Start: 2025-06-17 | End: 2025-06-17 | Stop reason: HOSPADM

## 2025-06-17 RX ORDER — DIPHENHYDRAMINE HYDROCHLORIDE 50 MG/ML
25 INJECTION, SOLUTION INTRAMUSCULAR; INTRAVENOUS
OUTPATIENT
Start: 2025-07-15

## 2025-06-17 RX ORDER — AZELASTINE 1 MG/ML
1 SPRAY, METERED NASAL 2 TIMES DAILY
Qty: 90 ML | Refills: 3 | OUTPATIENT
Start: 2025-06-17 | End: 2026-06-17

## 2025-06-17 RX ORDER — HEPARIN 100 UNIT/ML
500 SYRINGE INTRAVENOUS
OUTPATIENT
Start: 2025-07-15

## 2025-06-17 RX ORDER — DIPHENHYDRAMINE HCL 25 MG
25 CAPSULE ORAL
Status: DISCONTINUED | OUTPATIENT
Start: 2025-06-17 | End: 2025-06-17 | Stop reason: HOSPADM

## 2025-06-17 RX ORDER — SODIUM CHLORIDE 0.9 % (FLUSH) 0.9 %
10 SYRINGE (ML) INJECTION
OUTPATIENT
Start: 2025-07-15

## 2025-06-17 RX ORDER — EPINEPHRINE 0.3 MG/.3ML
0.3 INJECTION SUBCUTANEOUS ONCE AS NEEDED
Status: DISCONTINUED | OUTPATIENT
Start: 2025-06-17 | End: 2025-06-17 | Stop reason: HOSPADM

## 2025-06-17 RX ORDER — ACETAMINOPHEN 500 MG
500 TABLET ORAL
OUTPATIENT
Start: 2025-07-15

## 2025-06-17 RX ORDER — ACETAMINOPHEN 500 MG
500 TABLET ORAL
Status: DISCONTINUED | OUTPATIENT
Start: 2025-06-17 | End: 2025-06-17 | Stop reason: HOSPADM

## 2025-06-17 RX ORDER — SODIUM CHLORIDE 0.9 % (FLUSH) 0.9 %
10 SYRINGE (ML) INJECTION
Status: DISCONTINUED | OUTPATIENT
Start: 2025-06-17 | End: 2025-06-17 | Stop reason: HOSPADM

## 2025-06-17 RX ORDER — DIPHENHYDRAMINE HCL 25 MG
25 CAPSULE ORAL
OUTPATIENT
Start: 2025-07-15

## 2025-06-17 RX ADMIN — HUMAN IMMUNOGLOBULIN G 25 G: 20 LIQUID INTRAVENOUS at 11:06

## 2025-06-17 RX ADMIN — SODIUM CHLORIDE 250 ML: 9 INJECTION, SOLUTION INTRAVENOUS at 11:06

## 2025-06-17 NOTE — PROGRESS NOTES
Arrived for Privigen 25 g 1/1 as ordered. Started infusion as 14 ml/hr for the first 30mins.and then increased to 28ml/hr for the next 30mins. Followed by 56ml/hr--Max rate of 112.     Aware of next appt. In July.     Limited head-to-toe assessment due to privacy issues and visit reason though the opportunity was given for patient to express any concerns

## 2025-06-17 NOTE — TELEPHONE ENCOUNTER
Refill Routing Note   Medication(s) are not appropriate for processing by [facility] for the following reason(s):        Non-participating provider    [facility] action(s):  Route               Appointments  past 12m or future 3m with PCP    Date Provider   Last Visit   12/26/2024 Karlee Nagy NP-C   Next Visit   Visit date not found Karlee Nagy NP-C   ED visits in past 90 days: 0        Note composed:1:26 PM 06/17/2025

## 2025-06-18 NOTE — TELEPHONE ENCOUNTER
Pls call - colonoscopy was requested by her gi doctor    Upper GI scope normal in 2023- no need to repeat according to Dr Shea

## 2025-06-19 ENCOUNTER — TELEPHONE (OUTPATIENT)
Dept: INTERNAL MEDICINE | Facility: CLINIC | Age: 70
End: 2025-06-19
Payer: MEDICARE

## 2025-06-19 NOTE — TELEPHONE ENCOUNTER
Called and spoke to patient who stated that she did not have a colonoscopy in 2023.Will send a message to GI office to reach out to patient to

## 2025-06-19 NOTE — TELEPHONE ENCOUNTER
Spoke with patient and informed her that she is not due for a colon until 2026 per 's note on 06/08/2023. V/U

## 2025-06-23 ENCOUNTER — HOSPITAL ENCOUNTER (OUTPATIENT)
Dept: RADIOLOGY | Facility: HOSPITAL | Age: 70
Discharge: HOME | End: 2025-06-23
Attending: NURSE PRACTITIONER
Payer: MEDICARE

## 2025-06-23 ENCOUNTER — TELEPHONE (OUTPATIENT)
Dept: INTERNAL MEDICINE | Facility: CLINIC | Age: 70
End: 2025-06-23
Payer: MEDICARE

## 2025-06-23 ENCOUNTER — LAB VISIT (OUTPATIENT)
Dept: LAB | Facility: HOSPITAL | Age: 70
End: 2025-06-23
Attending: INTERNAL MEDICINE
Payer: MEDICARE

## 2025-06-23 DIAGNOSIS — D50.9 IRON DEFICIENCY ANEMIA, UNSPECIFIED IRON DEFICIENCY ANEMIA TYPE: Primary | ICD-10-CM

## 2025-06-23 DIAGNOSIS — C50.411 CARCINOMA OF UPPER-OUTER QUADRANT OF RIGHT BREAST IN FEMALE, ESTROGEN RECEPTOR POSITIVE: ICD-10-CM

## 2025-06-23 DIAGNOSIS — Z17.0 CARCINOMA OF UPPER-OUTER QUADRANT OF RIGHT BREAST IN FEMALE, ESTROGEN RECEPTOR POSITIVE: ICD-10-CM

## 2025-06-23 DIAGNOSIS — Z12.31 ENCOUNTER FOR SCREENING MAMMOGRAM FOR MALIGNANT NEOPLASM OF BREAST: ICD-10-CM

## 2025-06-23 DIAGNOSIS — D64.9 ANEMIA, UNSPECIFIED TYPE: ICD-10-CM

## 2025-06-23 LAB
FERRITIN SERPL-MCNC: 54 NG/ML (ref 20–300)
IRON SATN MFR SERPL: 11 % (ref 20–50)
IRON SERPL-MCNC: 37 UG/DL (ref 30–160)
TIBC SERPL-MCNC: 339 UG/DL (ref 250–450)
TRANSFERRIN SERPL-MCNC: 229 MG/DL (ref 200–375)

## 2025-06-23 PROCEDURE — 77067 SCR MAMMO BI INCL CAD: CPT | Mod: 26,52,, | Performed by: RADIOLOGY

## 2025-06-23 PROCEDURE — 77067 SCR MAMMO BI INCL CAD: CPT | Mod: TC,52

## 2025-06-23 PROCEDURE — 84238 ASSAY NONENDOCRINE RECEPTOR: CPT

## 2025-06-23 PROCEDURE — 82728 ASSAY OF FERRITIN: CPT

## 2025-06-23 PROCEDURE — 36415 COLL VENOUS BLD VENIPUNCTURE: CPT | Mod: PO

## 2025-06-23 PROCEDURE — 84466 ASSAY OF TRANSFERRIN: CPT

## 2025-06-23 PROCEDURE — 77063 BREAST TOMOSYNTHESIS BI: CPT | Mod: 26,52,, | Performed by: RADIOLOGY

## 2025-06-24 NOTE — TELEPHONE ENCOUNTER
I don't think she needs stomach scope.  No ulcer in 2023 when she had last s cope.  Would she like appt with GI to discuss?

## 2025-06-25 ENCOUNTER — PATIENT MESSAGE (OUTPATIENT)
Dept: ENDOSCOPY | Facility: HOSPITAL | Age: 70
End: 2025-06-25
Payer: MEDICARE

## 2025-06-25 ENCOUNTER — TELEPHONE (OUTPATIENT)
Dept: GASTROENTEROLOGY | Facility: CLINIC | Age: 70
End: 2025-06-25
Payer: MEDICARE

## 2025-06-25 ENCOUNTER — TELEPHONE (OUTPATIENT)
Dept: ENDOSCOPY | Facility: HOSPITAL | Age: 70
End: 2025-06-25
Payer: MEDICARE

## 2025-06-26 ENCOUNTER — TELEPHONE (OUTPATIENT)
Dept: ENDOSCOPY | Facility: HOSPITAL | Age: 70
End: 2025-06-26

## 2025-06-26 ENCOUNTER — TELEPHONE (OUTPATIENT)
Dept: INTERNAL MEDICINE | Facility: CLINIC | Age: 70
End: 2025-06-26
Payer: MEDICARE

## 2025-06-26 LAB — STFR SERPL-MCNC: 3 MG/L (ref 1.8–4.6)

## 2025-06-26 NOTE — TELEPHONE ENCOUNTER
Copied from CRM #[id number]. Topic: General Inquiry - Return Call  >> Jun 26, 2025 10:18 AM Nathaniel wrote:  Patient is returning a phone call.     Who left a message for the patient: nurse     Does patient know what this is regarding:  appt     Would you like a call back, or a response through your [facility] portal?: call back     Comments:

## 2025-06-26 NOTE — TELEPHONE ENCOUNTER
Copied from CRM #[id number]. Topic: General Inquiry - Patient Advice  >> Jun 26, 2025  9:57 AM Orin wrote:  Would like to receive medical advice.    Would they like a call back or a response via [email or web address]:  call back at [phone]    Additional information:  Pt is calling to speak to nurse in regards to test that she is supposed to have scheduled.  She said the orders were not put in her chart.  She is unsure of the name of the test.  She said she sent several messages regarding this.  Please call back to advise.

## 2025-06-27 ENCOUNTER — OFFICE VISIT (OUTPATIENT)
Dept: RHEUMATOLOGY | Facility: CLINIC | Age: 70
End: 2025-06-27
Payer: MEDICARE

## 2025-06-27 ENCOUNTER — PATIENT MESSAGE (OUTPATIENT)
Dept: DERMATOLOGY | Facility: CLINIC | Age: 70
End: 2025-06-27
Payer: MEDICARE

## 2025-06-27 VITALS
DIASTOLIC BLOOD PRESSURE: 62 MMHG | WEIGHT: 102.06 LBS | SYSTOLIC BLOOD PRESSURE: 129 MMHG | HEART RATE: 65 BPM | BODY MASS INDEX: 19.27 KG/M2 | HEIGHT: 61 IN

## 2025-06-27 DIAGNOSIS — L40.50 PSORIATIC ARTHRITIS: ICD-10-CM

## 2025-06-27 PROCEDURE — 99999 PR PBB SHADOW E&M-EST. PATIENT-LVL III: CPT | Mod: PBBFAC,,, | Performed by: INTERNAL MEDICINE

## 2025-06-27 RX ORDER — BENZTROPINE MESYLATE 0.5 MG/1
TABLET ORAL
COMMUNITY
Start: 2025-06-11

## 2025-06-27 RX ORDER — METHOTREXATE 2.5 MG/1
20 TABLET ORAL
Qty: 96 TABLET | Refills: 0 | Status: SHIPPED | OUTPATIENT
Start: 2025-06-27

## 2025-06-27 RX ORDER — FOLIC ACID 1 MG/1
1000 TABLET ORAL DAILY
Qty: 90 TABLET | Refills: 3 | Status: SHIPPED | OUTPATIENT
Start: 2025-06-27

## 2025-06-27 ASSESSMENT — ROUTINE ASSESSMENT OF PATIENT INDEX DATA (RAPID3)
PATIENT GLOBAL ASSESSMENT SCORE: 6.5
AM STIFFNESS SCORE: 1, YES
PSYCHOLOGICAL DISTRESS SCORE: 2.2
TOTAL RAPID3 SCORE: 5.94
MDHAQ FUNCTION SCORE: 1
PAIN SCORE: 8
FATIGUE SCORE: 7.5

## 2025-06-27 NOTE — PROGRESS NOTES
"Subjective:      Patient ID: Bhavna Landry is a 69 y.o. female.    Chief Complaint: PsA    HPI was out of methotrexate for 3 wks, now back on. Had increased pain in wrists and pain and swelling in hands. Has psoriasis both heels only . Hasn't seen Dr. Vega in a while  Review of Systems   Constitutional:  Positive for fatigue (7.5/10). Negative for appetite change, fever and unexpected weight change.   HENT:  Negative for mouth sores.    Eyes:  Negative for visual disturbance.   Respiratory:  Negative for cough, shortness of breath and wheezing.    Cardiovascular:  Negative for chest pain and palpitations.   Gastrointestinal:  Positive for constipation. Negative for abdominal pain, anal bleeding, blood in stool, diarrhea, nausea and vomiting.   Genitourinary:  Negative for dysuria, frequency and urgency.   Musculoskeletal:  Negative for arthralgias, back pain, gait problem, joint swelling, myalgias, neck pain and neck stiffness.   Skin:  Negative for rash.   Neurological:  Negative for weakness, numbness and headaches.   Hematological:  Negative for adenopathy. Does not bruise/bleed easily.   Psychiatric/Behavioral:  Negative for sleep disturbance. The patient is not nervous/anxious.         Objective:   /62   Pulse 65   Ht 5' 1" (1.549 m)   Wt 46.3 kg (102 lb 1.2 oz)   BMI 19.29 kg/m²   Physical Exam          5/23/2023 8/22/2023 2/23/2024 12/30/2024   Tender (CLARK-28) 0 / 28  0 / 28  0 / 28  0 / 28    Swollen (CLARK-28) 2 / 28  0 / 28  0 / 28  0 / 28    Provider Global 10 / 100 0 / 100 0 / 100 10 / 100   Patient Global 30 / 100 75 / 100 2 / 100 80 / 100   ESR 9 mm/hr 1 mm/hr 4 mm/hr 4 mm/hr   CRP 0.3 mg/L 1.9 mg/L 0.8 mg/L 1.4 mg/L   CLARK-28 (ESR) 2.35 (Remission) 1.05 (Remission) 1 (Remission) 2.09 (Remission)   CLARK-28 (CRP) 1.87 (Remission) 2.39 (Remission) 1.2 (Remission) 2.4 (Remission)   CDAI Score 6  7.5  0.2  9       Latest Reference Range & Units 06/10/25 11:26 06/12/25 12:59 06/23/25 11:41 06/23/25 " 12:25   WBC 3.90 - 12.70 K/uL 6.98      RBC 4.00 - 5.40 M/uL 3.84 (L)      Hemoglobin 12.0 - 16.0 gm/dL 11.5 (L)      Hematocrit 37.0 - 48.5 % 36.6 (L)      MCV 82 - 98 fL 95      MCH 27.0 - 31.0 pg 29.9      MCHC 32.0 - 36.0 g/dL 31.4 (L)      RDW 11.5 - 14.5 % 16.9 (H)      Platelet Count 150 - 450 K/uL 322      MPV 9.2 - 12.9 fL 9.2      Neut % 38 - 73 % 70.6      Lymph % 18 - 48 % 18.9      Mono % 4 - 15 % 7.3      Eos % <=8 % 1.9      Basophil % <=1.9 % 0.9      Immature Granulocytes 0.0 - 0.5 % 0.4      Gran # (ANC) 1.8 - 7.7 K/uL 4.93      Lymph # 1 - 4.8 K/uL 1.32      Mono # 0.3 - 1 K/uL 0.51      Eos # <=0.5 K/uL 0.13      Baso # <=0.2 K/uL 0.06      Immature Grans (Abs) 0.00 - 0.04 K/uL 0.03      nRBC <=0 /100 WBC 0      Iron 30 - 160 ug/dL    37   TIBC 250 - 450 ug/dL    339   Transferrin 200 - 375 mg/dL    229   Ferritin 20.0 - 300.0 ng/mL    54.0   Iron Saturation 20 - 50 %    11 (L)   Sed Rate <=36 mm/hr 4      Sodium 136 - 145 mmol/L 132 (L)      Potassium 3.5 - 5.1 mmol/L 4.8      Chloride 95 - 110 mmol/L 97      CO2 23 - 29 mmol/L 26      Anion Gap 8 - 16 mmol/L 9      BUN 8 - 23 mg/dL 8      Creatinine 0.5 - 1.4 mg/dL 0.8      eGFR >60 mL/min/1.73/m2 >60      Glucose 70 - 110 mg/dL 85      Calcium 8.7 - 10.5 mg/dL 9.2      ALP 40 - 150 unit/L 65      PROTEIN TOTAL 6.0 - 8.4 gm/dL 6.2      Albumin 3.5 - 5.2 g/dL 3.7      BILIRUBIN TOTAL 0.1 - 1.0 mg/dL 0.2      AST 11 - 45 unit/L 26      ALT 10 - 44 unit/L 16      CRP <=8.2 mg/L 0.4      FL Fluoro for Pain Management   Rpt     Mammo Digital Screening Left with Zaki (XPD)    Rpt    Soluble Transferrin Receptor (sTfR) 1.8 - 4.6 mg/L    3.0   (L): Data is abnormally low  (H): Data is abnormally high  Rpt: View report in Results Review for more information    EXAMINATION:  MRI SACROILIAC JOINTS W W/O CONTRAST     CLINICAL HISTORY:  Arthropathic psoriasis, unspecified     TECHNIQUE:  Routine multisequence multi planer MRI sacroiliac protocol performed  with the administration 7.5 cc gadolinium contrast.     COMPARISON:  None     FINDINGS:  SI joints: No evidence of sacroiliitis.  No significant enhancement/synovitis or saskia articular inflammatory change.  No fluid collections.  Mild degenerative change noted.     No fracture, marrow replacement process, or evidence of osteomyelitis.  There is advanced lower lumbar degenerative disc disease, particular on the left side, noting marked joint space loss with prominent sub endplate marrow edema/Modic 1 type change (series 4, images 23-30)..  Distended bladder noted.  Prior hysterectomy.  Small left renal cyst, partially imaged.     Impression:     No evidence of sacroiliitis.     Advanced left-sided degenerative disc disease in the lower lumbar spine.        Electronically signed by:Ari Rodriguez MD  Date:                                            01/15/2025  Time:                                           08:29      Saw Dr. Benítez in Allergy-Immunology for CVID  9/27/24:     # CVID; History of recurrent sinusitis: She required antibiotics at least 4 times between 2/2023 and 2/2024. She was previously on sulfasalazine for her psoriatic arthritis, which can cause hypogam, but hasn't been on it since 2021 (and it isn't clear how long the hypogam effects would last). Low total IgG and IgM. She had previously received pneumovax 3 times and prevnar-13 once. Pneumococcal titers were low, didn't respond to prevnar-20 or repeat pneumovax. Lymphocyte flow cytometry with low CD19 B cells.  -again discussed option of Ig replacement therapy (both IV and SubQ). Patient thinks that if she did decide to move forward with it, she would prefer IVIg. But as she has not had any infections requiring antibiotics since 2/2024, she isn't sure if she is ready to proceed. Given lack of recent infections, reasonable to wait; however, if she were to start getting frequent sinusitis again or if she were to develop a more serious infection such as  pneumonia, would recommend moving forward with Ig replacement therapy at that time.      * Update 24 - patient sent portal message saying she decided to move forward with Ig infusions, opting for the subcutaneous route. Ordered hizentra 12g SubQ q14 days (about 533 mg/kg/mo) to [facility].      # Rhinitis: Immunocaps negative for evidence of allergy. She reports flonase helps.  -continue flonase 1-2 SEN nightly.      # Wheezing and shortness of breath: Suspected COPD as patient is a long time smoker and previous chest imaging with emphysematous changes; however, she saw pulm recently, spirometry was without obstruction and more recent CT chest without emphysematous changes. Total eos have been normal. She reports she has been using albuterol or symbicort every night for wheezing (usually without shortness of breath).  -given the frequency of symptoms, recommend she start using the symbicort 80 mcg as a controller (2 puffs BID, or at least 2 puffs every night).  -continue albuterol or symbicort as rescue.     # Cervical lymphadenopathy: Left sided. She reports it has been present for months.  -alerting her oncologist giving her history of breast cancer.        Follow up: 3-6 months     Assessment:     Cervical demyelination with etanercept in past, no anti-TNF  Failed secukinumab  Failed sulfasalazine  Did not feel tofacitinib helped and stopped after visit of 22 and not refilled( 22), but patient apparently has been getting automatic refills without refill approvals.   Guselkumab ordered 22 never filled        PsA  TJ  0 SJ 0 Pt global 65  ESR 4  CRP 0.4  DAS28 2.44   FRR62-QHH   2.55(both remission)CDAI 8.54(LDA)  DAPSA  TJ 0 SJ  0 Pt global 6.5   Pt pain 8  CRP  0.04 = 14.45(LDA-MDA)  Osteoporosis DXA 3/18/24  FRAX major 12.5 % hip 4.6%, s/p hip fracture;received  Reclast zoledronic acid(5mg IV) 20, 3/17/22. Now on Zometa (zoledronic acid) (4mg IV) q 6 months for  breast cancer from Dr. Marcelino  so no need for Reclast  most  recent dose 5/8/25  Not a candidate for anabolic Rx with breast cancer  Psoriasis,heels only  OA hands  Major issue is severe left lumbar radiculopathy largely in L5 distribution had caudal SOPHY  Dr. Storm  6/12/25 wit significant relief Much less likely psoriatic spondylitis/sacroiliitis.   Breast cancer on exemestane 25mg daily  and zoledronate 4mg IV q 6 months  /62  Cervical spondylosis, neck pain  Hypogammaglobulinemia now on Hizentra 12 g sc q 14 days   Body mass index is 19.29 kg/m².  Anemia TONIE and anemia of inflammation/chronic disease    Plan:   F/u Dr Vega for heel psoriasis, topicals  Continue with  for low back   Continue f/u  Dr. Benítez Allergy Immunology today for hypogamaglobulinemia now on Hizentra 12 g sc  q 14days  methotrexate to 20mg po every Monday, divided dose refill to [facility]  Folic acid 1mg daily refill to [facility]  Continue Zometa(zoledronate) 4mg q 6 months per Dr. Marcelino in Heme-Onc  No  need to add guselkumab 100mg sc wk 0,4 then q 8 wks. If OK with Dr. Marcelino her oncologist   F/u Dr. Vargas for TONIE  RTC  3 months with CBC CMP ESR CRP

## 2025-06-29 ENCOUNTER — RESULTS FOLLOW-UP (OUTPATIENT)
Dept: INTERNAL MEDICINE | Facility: CLINIC | Age: 70
End: 2025-06-29

## 2025-06-29 DIAGNOSIS — D50.9 IRON DEFICIENCY ANEMIA, UNSPECIFIED IRON DEFICIENCY ANEMIA TYPE: Primary | ICD-10-CM

## 2025-07-01 ENCOUNTER — PATIENT MESSAGE (OUTPATIENT)
Dept: PAIN MEDICINE | Facility: CLINIC | Age: 70
End: 2025-07-01
Payer: MEDICARE

## 2025-07-02 DIAGNOSIS — M54.12 CERVICAL RADICULOPATHY: ICD-10-CM

## 2025-07-02 DIAGNOSIS — M54.16 LUMBAR RADICULOPATHY: Primary | ICD-10-CM

## 2025-07-02 DIAGNOSIS — M54.17 LUMBOSACRAL RADICULOPATHY: ICD-10-CM

## 2025-07-02 RX ORDER — PREGABALIN 100 MG/1
100 CAPSULE ORAL 3 TIMES DAILY
Qty: 90 CAPSULE | Refills: 2 | Status: SHIPPED | OUTPATIENT
Start: 2025-07-02 | End: 2025-09-30

## 2025-07-14 DIAGNOSIS — C50.411 CARCINOMA OF UPPER-OUTER QUADRANT OF RIGHT BREAST IN FEMALE, ESTROGEN RECEPTOR POSITIVE: ICD-10-CM

## 2025-07-14 DIAGNOSIS — Z17.0 CARCINOMA OF UPPER-OUTER QUADRANT OF RIGHT BREAST IN FEMALE, ESTROGEN RECEPTOR POSITIVE: ICD-10-CM

## 2025-07-14 NOTE — TELEPHONE ENCOUNTER
Care Due:                  Date            Visit Type   Department     Provider  --------------------------------------------------------------------------------                                EP -                              PRIMARY      [facility]  Last Visit: 06-      CARE ([facility])   MEDICINE       Sadaf Vargas  Next Visit: None Scheduled  None         None Found                                                            Last  Test          Frequency    Reason                     Performed    Due Date  --------------------------------------------------------------------------------    Lipid Panel.  12 months..  atorvastatin.............  07- 07-    [facility] Embedded Care Due Messages. Reference number: [id number].   7/14/2025 3:17:45 PM CDT

## 2025-07-15 ENCOUNTER — PATIENT MESSAGE (OUTPATIENT)
Dept: GASTROENTEROLOGY | Facility: CLINIC | Age: 70
End: 2025-07-15
Payer: MEDICARE

## 2025-07-15 ENCOUNTER — INFUSION (OUTPATIENT)
Dept: INFECTIOUS DISEASES | Facility: HOSPITAL | Age: 70
End: 2025-07-15
Payer: MEDICARE

## 2025-07-15 VITALS
HEART RATE: 69 BPM | TEMPERATURE: 98 F | DIASTOLIC BLOOD PRESSURE: 72 MMHG | HEIGHT: 61 IN | BODY MASS INDEX: 19.07 KG/M2 | OXYGEN SATURATION: 96 % | RESPIRATION RATE: 16 BRPM | SYSTOLIC BLOOD PRESSURE: 158 MMHG | WEIGHT: 101 LBS

## 2025-07-15 DIAGNOSIS — D83.9 COMMON VARIABLE IMMUNODEFICIENCY: Primary | ICD-10-CM

## 2025-07-15 PROCEDURE — 25000003 PHARM REV CODE 250: Performed by: STUDENT IN AN ORGANIZED HEALTH CARE EDUCATION/TRAINING PROGRAM

## 2025-07-15 PROCEDURE — 63600175 PHARM REV CODE 636 W HCPCS: Mod: JZ,TB | Performed by: STUDENT IN AN ORGANIZED HEALTH CARE EDUCATION/TRAINING PROGRAM

## 2025-07-15 PROCEDURE — 96366 THER/PROPH/DIAG IV INF ADDON: CPT

## 2025-07-15 PROCEDURE — 96365 THER/PROPH/DIAG IV INF INIT: CPT

## 2025-07-15 RX ORDER — DIPHENHYDRAMINE HYDROCHLORIDE 50 MG/ML
25 INJECTION, SOLUTION INTRAMUSCULAR; INTRAVENOUS
OUTPATIENT
Start: 2025-08-12

## 2025-07-15 RX ORDER — HEPARIN 100 UNIT/ML
500 SYRINGE INTRAVENOUS
OUTPATIENT
Start: 2025-08-12

## 2025-07-15 RX ORDER — DIPHENHYDRAMINE HCL 25 MG
25 CAPSULE ORAL
OUTPATIENT
Start: 2025-08-12

## 2025-07-15 RX ORDER — EPINEPHRINE 0.3 MG/.3ML
0.3 INJECTION SUBCUTANEOUS ONCE AS NEEDED
OUTPATIENT
Start: 2025-08-12

## 2025-07-15 RX ORDER — SODIUM CHLORIDE 0.9 % (FLUSH) 0.9 %
10 SYRINGE (ML) INJECTION
OUTPATIENT
Start: 2025-08-12

## 2025-07-15 RX ORDER — TRAMADOL HYDROCHLORIDE 50 MG/1
50 TABLET, FILM COATED ORAL EVERY 8 HOURS PRN
Qty: 90 TABLET | Refills: 0 | Status: SHIPPED | OUTPATIENT
Start: 2025-07-15

## 2025-07-15 RX ORDER — ACETAMINOPHEN 500 MG
500 TABLET ORAL
OUTPATIENT
Start: 2025-08-12

## 2025-07-15 RX ADMIN — HUMAN IMMUNOGLOBULIN G 25 G: 20 LIQUID INTRAVENOUS at 12:07

## 2025-07-15 RX ADMIN — SODIUM CHLORIDE 250 ML: 9 INJECTION, SOLUTION INTRAVENOUS at 12:07

## 2025-07-15 NOTE — PROGRESS NOTES
Arrived for Privigen 25 g 1/1 as ordered. Started infusion as 14 ml/hr for the first 30mins.and then increased to 28ml/hr for the next 30mins. Followed by 56ml/hr--Max rate of 112.       Limited head-to-toe assessment due to privacy issues and visit reason though the opportunity was given for patient to express any concerns

## 2025-07-23 ENCOUNTER — OFFICE VISIT (OUTPATIENT)
Dept: GASTROENTEROLOGY | Facility: CLINIC | Age: 70
End: 2025-07-23
Payer: MEDICARE

## 2025-07-23 DIAGNOSIS — K59.04 CHRONIC IDIOPATHIC CONSTIPATION: ICD-10-CM

## 2025-07-23 DIAGNOSIS — R13.10 DYSPHAGIA, UNSPECIFIED TYPE: ICD-10-CM

## 2025-07-23 DIAGNOSIS — D50.9 IRON DEFICIENCY ANEMIA, UNSPECIFIED IRON DEFICIENCY ANEMIA TYPE: ICD-10-CM

## 2025-07-23 DIAGNOSIS — Z12.11 SCREENING FOR COLON CANCER: Primary | ICD-10-CM

## 2025-07-23 RX ORDER — SODIUM, POTASSIUM,MAG SULFATES 17.5-3.13G
1 SOLUTION, RECONSTITUTED, ORAL ORAL DAILY
Qty: 1 KIT | Refills: 0 | Status: SHIPPED | OUTPATIENT
Start: 2025-07-23

## 2025-07-23 RX ORDER — POLYETHYLENE GLYCOL 3350 17 G/17G
POWDER, FOR SOLUTION ORAL
Qty: 30 EACH | Refills: 0 | Status: SHIPPED | OUTPATIENT
Start: 2025-07-23

## 2025-07-23 NOTE — PROGRESS NOTES
GASTROENTEROLOGY CLINIC NOTE    The patient location is: [place]  The chief complaint leading to consultation is: colon cancer screening, anemia    Visit type: audiovisual    Face to Face time with patient: 10:17  30 minutes of total time spent on the encounter, which includes face to face time and non-face to face time preparing to see the patient (eg, review of tests), Obtaining and/or reviewing separately obtained history, Documenting clinical information in the electronic or other health record, Independently interpreting results (not separately reported) and communicating results to the patient/family/caregiver, or Care coordination (not separately reported).       Each patient to whom he or she provides medical services by telemedicine is:  (1) informed of the relationship between the physician and patient and the respective role of any other health care provider with respect to management of the patient; and (2) notified that he or she may decline to receive medical services by telemedicine and may withdraw from such care at any time.       Chief Complaint: The primary encounter diagnosis was Screening for colon cancer. Diagnoses of Iron deficiency anemia, unspecified iron deficiency anemia type, Chronic idiopathic constipation, and Dysphagia, unspecified type were also pertinent to this visit.  Referring provider/PCP: Sadaf Vargas MD    HPI  Bhavna Landry is a 70 y.o. female who is a new patient to me who presents via telemedicine encounter for anemia, colon cancer screening, and constipation. She also reports occasional difficulty with swallowing.     Last seen by Dr. Shea 2023 for anemia follow up. EGD at that time significant for erosive gastropathy. Significant NSAID use at that time. Denies current NSAID use.   Occasionally feels food sticking in chest; clears with water. Does not seem to occur with specific foods. No difficulty with liquids. Denies heartburn or indigestion.     Anemia is  not a new problem. Recent H/H 11.5/36.6. Denies SOB, ice cravings.   Has fatigue.     Constipation    Frequency of Bowel Movements:   every other day with stool softener   Straining:   yes   Complete Evacuation:   no   Hematochezia:   no   Abdominal Pain:  no     Unexplained Weight Loss/Change in Bowel Habits:  no    Water Intake:   good    Daily Fiber Supplement:  no       Treatment: stool softener       GLP-1s: No  NSAIDs: ASA 81 mg  Anticoagulation or Antiplatelet: No    History of H.pylori:  H.pylori Treatment:  Prior Upper Endoscopy: 2/2023 with Dr. Shea  Impression:            - Normal esophagus.                          - Erosive gastropathy with no bleeding and no                          stigmata of recent bleeding. Biopsied.                          - Normal examined duodenum. Biopsied.     Pathology:   1. Duodenum, biopsy:   - Duodenal mucosa without significant histopathologic alteration   - Negative for active duodenitis or celiac-like injury   - Negative for dysplasia or malignancy   2. Stomach, biopsy:   - Mild chronic inactive gastritis   - Negative for Helicobacter pylori on H&E stain and immunostain   - Negative for intestinal metaplasia, dysplasia or malignancy   NOTE: Positive control and internal negative control for Helicobacter pylori   immunostain were examined and were appropriate.     Prior Colonoscopy: 12/2022 with Dr. Walters  Impression:            - Seven 4 to 7 mm polyps at the recto-sigmoid                          colon, in the transverse colon and in the                          ascending colon, removed with a cold snare.                          Resected and retrieved.                          - The distal rectum and anal verge are normal on                          retroflexion view.     Pathology:   Colon, polyp x7, biopsy:   - Sessile serrated lesion, negative for cytologic dysplasia, multiple   fragments.   - Colonic mucosa with hyperplastic change and lymphoid aggregate,  multiple   fragments.   - Fecal material, multiple fragments.     Family h/o Colon Cancer: No  Family h/o Crohn's Disease or Ulcerative Colitis: No  Family h/o Celiac Sprue: No    Review of Systems   Constitutional:  Positive for malaise/fatigue. Negative for weight loss.   HENT:  Negative for sore throat.    Eyes:  Negative for blurred vision.   Respiratory:  Negative for cough.    Cardiovascular:  Negative for chest pain.   Gastrointestinal:  Positive for constipation. Negative for abdominal pain, blood in stool, diarrhea, heartburn, melena, nausea and vomiting.   Genitourinary:  Negative for dysuria.   Musculoskeletal:  Negative for myalgias.   Skin:  Negative for rash.   Neurological:  Negative for headaches.   Endo/Heme/Allergies:  Negative for environmental allergies.   Psychiatric/Behavioral:  Negative for suicidal ideas. The patient is not nervous/anxious.        Past Medical History: has a past medical history of Allergy, Amblyopia, Anemia, Anticoagulant long-term use, Arthritis, 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, estrogen receptor positive, Cataract, Depression, Dry eyes, Dry mouth, Duane's syndrome of right eye, Early dry stage nonexudative age-related macular degeneration of both eyes, Fibromyalgia, Fibromyalgia, Fractured hip, GERD (gastroesophageal reflux disease), History of psychiatric hospitalization, Hyperlipidemia, Hypertension, Hypocalcemia, Hyponatremia, Kidney stone, Migraine headache, Osteoporosis, Pressure ulcer of unspecified site, unspecified stage, Psoriatic arthritis, Recurrent upper respiratory infection (URI), Right knee pain, RLS (restless legs syndrome), Schizophrenia, Sciatica, Squamous cell carcinoma of skin, and Urinary tract infection.    Past Surgical History: has a past surgical history that includes Surgery on right knee (07/09/1982); cyst removed from right sinus (07/09/1982); tumor removed from back left side upper shoulder (03/17/2006);  Hysterectomy; Knee surgery;  section; Joint replacement (Right); Colonoscopy (N/A, 2016); Injection of anesthetic agent into sacroiliac joint (Right, 2018); Injection of anesthetic agent into sacroiliac joint (Bilateral, 2018); Injection of anesthetic agent into sacroiliac joint (Bilateral, 2019); brow ptosis repair (Right, 2019); Injection of anesthetic agent around medial branch nerves innervating lumbar facet joint (N/A, 2019); Radiofrequency thermocoagulation (Right, 2019); Radiofrequency thermocoagulation (Left, 2019); Phacoemulsification of cataract (Right, 2019); Intraocular prosthesis insertion (Right, 2019); Cataract extraction; Phacoemulsification of cataract (Left, 2019); Intraocular prosthesis insertion (Left, 2019); Radiofrequency thermocoagulation (Right, 10/22/2019); Radiofrequency thermocoagulation (Left, 10/29/2019); Radiofrequency thermocoagulation (Left, 2020); Radiofrequency thermocoagulation (Right, 2020); Mastectomy (Right, 2022); [place] lymph node biopsy (Right, 2022); Injection for sentinel node identification (Right, 2022); Colonoscopy (N/A, 2022); Esophagogastroduodenoscopy (N/A, 2023); Injection of anesthetic agent around medial branch nerves innervating lumbar facet joint (Bilateral, 2023); Epidural steroid injection into lumbar spine (N/A, 2023); Epidural steroid injection into cervical spine (N/A, 2023); Tonsillectomy; Sinus surgery; radiofrequency ablation, nerve, peripheral (Right, 2024); Foot fracture surgery; Epidural steroid injection into lumbar spine (N/A, 2024); injection, spine, lumbosacral, transforaminal approach (Right, 2024); Caudal epidural steroid injection (N/A, 2024); Caudal epidural steroid injection (N/A, 2025); Carotid endarterectomy (Right, 2025); and Epidural steroid injection into lumbar spine  (N/A, 6/12/2025).    Family History:family history includes Arthritis in her brother; Asthma in her mother; Cancer in her father and mother; Colon cancer in her mother; Depression in her mother; Diabetes in her brother; Heart disease in her brother; No Known Problems in her daughter; Psoriasis in her mother; Stroke in her sister.    Allergies:   Review of patient's allergies indicates:   Allergen Reactions    Etanercept Other (See Comments)     Other reaction(s): recurrent infections    Chloramphenicol sod succinate Hives    Codeine Other (See Comments)     Other reaction(s): Stomach upset. Pt states OK with Percocet    Nickel sutures [surgical stainless steel] Dermatitis     Allergic contact dermatitis    Adhesive Rash       Social History: reports that she has been smoking cigarettes. She started smoking about 12 years ago. She has a 2.5 pack-year smoking history. She has never been exposed to tobacco smoke. She has quit using smokeless tobacco. She reports that she does not drink alcohol and does not use drugs.    Home medications: Medications Ordered Prior to Encounter[1]    Vital signs:  There were no vitals taken for this visit.    Physical Exam  Constitutional:       Appearance: Normal appearance. She is not ill-appearing.      Comments: Limited Physical Exam d/t Telemedicine Encounter   HENT:      Head: Normocephalic.   Pulmonary:      Effort: Pulmonary effort is normal. No respiratory distress.   Neurological:      Mental Status: She is alert and oriented to person, place, and time.   Psychiatric:         Mood and Affect: Mood normal.         Behavior: Behavior normal.         Thought Content: Thought content normal.         Judgment: Judgment normal.         Routine labs:  Lab Results   Component Value Date    WBC 6.98 06/10/2025    HGB 11.5 (L) 06/10/2025    HCT 36.6 (L) 06/10/2025    MCV 95 06/10/2025     06/10/2025     Lab Results   Component Value Date    INR 1.0 02/14/2014     Lab Results  "  Component Value Date    IRON 37 06/23/2025    FERRITIN 54.0 06/23/2025    TIBC 339 06/23/2025    FESATURATED 11 (L) 09/27/2024     Lab Results   Component Value Date     (L) 06/10/2025    K 4.8 06/10/2025    CL 97 06/10/2025    CO2 26 06/10/2025    BUN 8 06/10/2025    CREATININE 0.8 06/10/2025    GLUF 100 07/01/2009     Lab Results   Component Value Date    ALBUMIN 3.7 06/10/2025    ALT 16 06/10/2025    AST 26 06/10/2025    GGT 91 (H) 07/28/2014    ALKPHOS 65 06/10/2025    BILITOT 0.2 06/10/2025     No results found for: "GLUCOSE"  Lab Results   Component Value Date    TSH 1.574 04/10/2025     Lab Results   Component Value Date    CALCIUM 9.2 06/10/2025    PHOS 5.4 (H) 04/03/2023       Imaging:      I have reviewed prior labs, imaging, and notes.      Assessment:  1. Screening for colon cancer    2. Iron deficiency anemia, unspecified iron deficiency anemia type    3. Chronic idiopathic constipation    4. Dysphagia, unspecified type      Anemia. Patient with hx of anemia. Overdue for colonoscopy.   Constipation. Taking stool softener to help with constipation.   Occasional dysphagia with solids.     Plan:  Orders Placed This Encounter    sodium,potassium,mag sulfates (SUPREP BOWEL PREP KIT) 17.5-3.13-1.6 gram SolR    polyethylene glycol (GLYCOLAX) 17 gram PwPk    Case Request Endoscopy: COLONOSCOPY, EGD (ESOPHAGOGASTRODUODENOSCOPY)     Glycolax for constipation  EGD due to anemia and recent onset of dysphagia  Colonoscopy for screening    Consider VCE pending workup    Plan of care discussed with patient who is in agreement and verbalized understanding.     I have explained the planned procedures to the patient.The risks, benefits and alternatives of the procedure were also explained in detail. Patient verbalized understanding, all questions were answered. The patient agrees to proceed as planned    Follow Up: RICCI Hudson, APRN,FNP-BC  [facility]/. " [place]    (Portions of this note were dictated using voice recognition software and may contain dictation related errors in spelling/grammar/syntax not found on text review)         [1]   Current Outpatient Medications on File Prior to Visit   Medication Sig Dispense Refill    acetaminophen (TYLENOL) 500 MG tablet Take 1 tablet (500 mg total) by mouth every 6 (six) hours as needed.      albuterol (PROVENTIL) 2.5 mg /3 mL (0.083 %) nebulizer solution Take 3 mLs (2.5 mg total) by nebulization every 6 (six) hours as needed for Wheezing. Rescue 90 each 3    albuterol (PROVENTIL/VENTOLIN HFA) 90 mcg/actuation inhaler USE 2 INHALATIONS BY MOUTH 4  TIMES DAILY AS NEEDED 25.5 g 3    amLODIPine (NORVASC) 5 MG tablet Take 1 tablet (5 mg total) by mouth 2 (two) times a day. 180 tablet 3    ascorbic acid, vitamin C, (VITAMIN C) 500 MG tablet Take 500 mg by mouth once daily.      aspirin (ECOTRIN) 81 MG EC tablet Take 81 mg by mouth once daily.      atorvastatin (LIPITOR) 80 MG tablet Take 1 tablet (80 mg total) by mouth once daily. 90 tablet 3    azelastine (ASTELIN) 137 mcg (0.1 %) nasal spray 1 spray (137 mcg total) by Nasal route 2 (two) times daily. 90 mL 3    benztropine (COGENTIN) 0.5 MG tablet Take by mouth.      budesonide-formoterol 80-4.5 mcg (SYMBICORT) 80-4.5 mcg/actuation HFAA Inhale 2 puffs into the lungs 2 (two) times a day. Controller 10.2 g 11    exemestane (AROMASIN) 25 mg tablet Take 1 tablet (25 mg total) by mouth once daily. 30 tablet 11    folic acid (FOLVITE) 1 MG tablet Take 1 tablet (1,000 mcg total) by mouth once daily. 90 tablet 3    HYDROcodone-acetaminophen (NORCO) 5-325 mg per tablet Take 1 tablet by mouth every 8 (eight) hours as needed for Pain. 90 tablet 0    immun glob G,IgG,-pro-IgA 0-50 (HIZENTRA) 4 gram/20 mL (20 %) Soln Inject 60 mLs (12 g total) into the skin every 14 (fourteen) days. 120 mL 11    isosorbide mononitrate (IMDUR) 30 MG 24 hr tablet TAKE 1 TABLET(30 MG) BY MOUTH DAILY 90  tablet 3    isosorbide mononitrate (IMDUR) 30 MG 24 hr tablet Take 1 tablet (30 mg total) by mouth once daily. 90 tablet 3    ketoconazole (NIZORAL) 2 % shampoo Wash hair with medicated shampoo at least 2x/month - let sit on scalp at least 5 minutes prior to rinsing 120 mL 5    methotrexate 2.5 MG Tab Take 8 tablets (20 mg total) by mouth every 7 days. Take 4 tabs Mon am and 4 tabs Mon  pm only 96 tablet 0    omega-3 fatty acids/fish oil (FISH OIL-OMEGA-3 FATTY ACIDS) 300-1,000 mg capsule Take by mouth once daily.      omeprazole (PRILOSEC) 40 MG capsule TAKE 1 CAPSULE(40 MG) BY MOUTH EVERY MORNING 90 capsule 3    oxyCODONE (ROXICODONE) 5 MG immediate release tablet Take 1 tablet (5 mg total) by mouth every 12 (twelve) hours as needed. 10 tablet 0    pregabalin (LYRICA) 100 MG capsule Take 1 capsule (100 mg total) by mouth 3 (three) times daily. 90 capsule 2    risperiDONE (RISPERDAL) 2 MG tablet TAKE 1 TABLET(2 MG) BY MOUTH EVERY MORNING 90 tablet 3    risperiDONE (RISPERDAL) 4 MG tablet Take 1 tablet (4 mg total) by mouth every evening. 90 tablet 3    traMADoL (ULTRAM) 50 mg tablet Take 1 tablet (50 mg total) by mouth every 8 (eight) hours as needed for Pain. 90 tablet 0    valsartan (DIOVAN) 80 MG tablet Take 1 tablet (80 mg total) by mouth 2 (two) times daily. 180 tablet 3    vitamin D (VITAMIN D3) 1000 units Tab Take 1,000 Units by mouth once daily.      vitamin E 200 UNIT capsule Take 200 Units by mouth once daily.      zinc gluconate 50 mg tablet Take 50 mg by mouth once daily.      [DISCONTINUED] fluticasone propionate (FLONASE) 50 mcg/actuation nasal spray 1 SPRAY EACH NOSTRIL EVERY DAY 32 g 2     Current Facility-Administered Medications on File Prior to Visit   Medication Dose Route Frequency Provider Last Rate Last Admin    tropicamide 1% ophthalmic solution 1 drop  1 drop Right Eye On Call Procedure Karen Song MD   1 drop at 08/01/19 0648

## 2025-07-24 ENCOUNTER — TELEPHONE (OUTPATIENT)
Dept: PAIN MEDICINE | Facility: CLINIC | Age: 70
End: 2025-07-24

## 2025-07-24 ENCOUNTER — OFFICE VISIT (OUTPATIENT)
Dept: PAIN MEDICINE | Facility: CLINIC | Age: 70
End: 2025-07-24
Payer: MEDICARE

## 2025-07-24 ENCOUNTER — TELEPHONE (OUTPATIENT)
Dept: GASTROENTEROLOGY | Facility: CLINIC | Age: 70
End: 2025-07-24
Payer: MEDICARE

## 2025-07-24 VITALS
SYSTOLIC BLOOD PRESSURE: 180 MMHG | DIASTOLIC BLOOD PRESSURE: 71 MMHG | HEIGHT: 61 IN | WEIGHT: 100.5 LBS | BODY MASS INDEX: 18.98 KG/M2 | HEART RATE: 67 BPM

## 2025-07-24 DIAGNOSIS — M54.17 LUMBOSACRAL RADICULOPATHY: ICD-10-CM

## 2025-07-24 DIAGNOSIS — M47.816 LUMBAR SPONDYLOSIS: Primary | ICD-10-CM

## 2025-07-24 DIAGNOSIS — M54.16 LUMBAR RADICULOPATHY: ICD-10-CM

## 2025-07-24 DIAGNOSIS — G89.4 CHRONIC PAIN SYNDROME: ICD-10-CM

## 2025-07-24 PROCEDURE — G2211 COMPLEX E/M VISIT ADD ON: HCPCS | Mod: S$GLB,,, | Performed by: STUDENT IN AN ORGANIZED HEALTH CARE EDUCATION/TRAINING PROGRAM

## 2025-07-24 PROCEDURE — 3288F FALL RISK ASSESSMENT DOCD: CPT | Mod: CPTII,S$GLB,, | Performed by: STUDENT IN AN ORGANIZED HEALTH CARE EDUCATION/TRAINING PROGRAM

## 2025-07-24 PROCEDURE — 4010F ACE/ARB THERAPY RXD/TAKEN: CPT | Mod: CPTII,S$GLB,, | Performed by: STUDENT IN AN ORGANIZED HEALTH CARE EDUCATION/TRAINING PROGRAM

## 2025-07-24 PROCEDURE — 99214 OFFICE O/P EST MOD 30 MIN: CPT | Mod: S$GLB,,, | Performed by: STUDENT IN AN ORGANIZED HEALTH CARE EDUCATION/TRAINING PROGRAM

## 2025-07-24 PROCEDURE — 3077F SYST BP >= 140 MM HG: CPT | Mod: CPTII,S$GLB,, | Performed by: STUDENT IN AN ORGANIZED HEALTH CARE EDUCATION/TRAINING PROGRAM

## 2025-07-24 PROCEDURE — 99999 PR PBB SHADOW E&M-EST. PATIENT-LVL IV: CPT | Mod: PBBFAC,,, | Performed by: STUDENT IN AN ORGANIZED HEALTH CARE EDUCATION/TRAINING PROGRAM

## 2025-07-24 PROCEDURE — 3078F DIAST BP <80 MM HG: CPT | Mod: CPTII,S$GLB,, | Performed by: STUDENT IN AN ORGANIZED HEALTH CARE EDUCATION/TRAINING PROGRAM

## 2025-07-24 PROCEDURE — 1159F MED LIST DOCD IN RCRD: CPT | Mod: CPTII,S$GLB,, | Performed by: STUDENT IN AN ORGANIZED HEALTH CARE EDUCATION/TRAINING PROGRAM

## 2025-07-24 PROCEDURE — 1125F AMNT PAIN NOTED PAIN PRSNT: CPT | Mod: CPTII,S$GLB,, | Performed by: STUDENT IN AN ORGANIZED HEALTH CARE EDUCATION/TRAINING PROGRAM

## 2025-07-24 PROCEDURE — 1101F PT FALLS ASSESS-DOCD LE1/YR: CPT | Mod: CPTII,S$GLB,, | Performed by: STUDENT IN AN ORGANIZED HEALTH CARE EDUCATION/TRAINING PROGRAM

## 2025-07-24 PROCEDURE — 3008F BODY MASS INDEX DOCD: CPT | Mod: CPTII,S$GLB,, | Performed by: STUDENT IN AN ORGANIZED HEALTH CARE EDUCATION/TRAINING PROGRAM

## 2025-07-24 NOTE — TELEPHONE ENCOUNTER
IMPORTANT INFORMATION TO KNOW BEFORE YOUR PROCEDURE    [facility] 2nd Floor       Date of procedure: 9/26/2025 Arrive at: 9:00 AM     If your procedure requires the administration of anesthesia, it is necessary for a responsible adult to drive you home. (Medical Transportation, Uber, Lyft, Taxi, etc. may ONLY be used if a responsible adult is present to accompany you home.  The responsible adult CAN'T be the  of the service).      person must be available to return to pick you up within 15 minutes of being notified of discharge.       Please bring a picture ID, insurance card, & copayment      Take Medications as directed below:      If you begin taking any blood thinning medications or injectable weight loss/diabetes medications (other than insulin) , please contact the endoscopy scheduling department listed below as soon as possible.    If you are diabetic see the attached instruction sheet regarding your medication.     If you take HEART, BLOOD PRESSURE, SEIZURE, PAIN, LUNG (including inhalers/nebulizers), ANTI-REJECTION (transplant patients), or PSYCHIATRIC medications, please take at your regular times with a sip of water or as directed by the scheduling nurse.     Important contact information:    Endoscopy [phone] Hours of operation Monday-Friday 8:00-4:30pm.    Questions about insurance or financial obligations call [phone] or [phone].    If you have questions regarding the prep or need to reschedule, please call [phone]. After hours questions requiring immediate assistance, contact [facility] nurse line at [phone] or [phone].   NOTE:     On occasion, unforeseen circumstances may cause a delay in your procedure start time. We respect your time and appreciate your patience during these circumstances.    Colonoscopy Procedure Prep Instructions        Date of procedure: 9/26/2025 with     Location of Department:    [facility] [address] [place] [place] [address]   Take the Elevators to 2nd Floor Endoscopy Procedural Area      As soon as possible:   your prep from pharmacy and over the counter DULCOLAX LAXATIVE TABLETS     What You Can do:  You may have clear liquids ONLY-see below for list.     Liquids That Are OK to Drink:   Water  Sports drinks (Gatorade, Power-Aid)  Coffee or tea (no cream or nondairy creamer)  Clear juices without pulp (apple, white grape)  Gelatin desserts (no fruit or toppings)  Clear soda (sprite, coke, ginger ale)  Chicken broth (until 12 midnight the night before procedure)    What You CANNOT do:   Do not EAT solid food, drink milk or anything   colored red.  Do not drink alcohol.  Do not take oral medications within 1 hour of starting each dose of SUPREP.  No gum chewing or candy morning of procedure.    Note:   You will be picking up (2- KITS) from the pharmacy.   Please disregard the insert instructions from pharmacy).  SUPREP Bowel Prep Kit is indicated for cleansing of the colon as a preparation for colonoscopy in adults.   Be sure to tell your doctor about all the medicines you take, including prescription and non-prescription medicines, vitamins, and herbal supplements. SUPREP Bowel Prep Kit may affect how other medicines work.  Medication taken by mouth may not be absorbed properly when taken within 1 hour before the start of each dose of SUPREP Bowel Prep Kit.  It is not uncommon to experience some abdominal cramping, nausea and/or vomiting when taking the prep. If you have nausea and/or vomiting while taking the prep, stop drinking for 20 to 30 minutes then continue.    Two (2)-SUPREP Bowel Prep Kit is a (4-dose) prep.   ALL 6-ounce bottles are required for a complete preparation for colonoscopy. Dilute the solution concentrate as directed prior to use. You must drink water with each dose of SUPREP, and additional water after each dose.    How to take prep:    DOSE  1-- 2 Days Before Colonoscopy 9/24/2025     Drink at least 6 to 8 glasses of clear liquids from time you wake up until you begin your prep and then continue until bedtime to avoid dehydration.     12:00 pm (NOON) Take four (4) Dulcolax (Bisacodyl) tablets with at least 8 oz. or more of clear liquids.    At 6:00 pm:  You must complete Steps 1 through 4 using one (1)   6-ounce bottle before going to bed as shown below:    Step 1-Pour ONE (1) 6-ounce bottle of SUPREP liquid into the mixing container  Step 2-Add cool drinking water to the 16-ounce line on the container and mix  Step 3-Drink ALL the liquid in the container.  Step 4-You must drink two (2) more 16-ounce containers of water over the next 1 hour    DOSE 2--Day Before Colonoscopy 9/25/2025     Drink at least 6 to 8 glasses of clear liquids from time you wake up until you begin your prep and then continue until bedtime to avoid dehydration.      8:00 am:  For this dose, repeat Steps 1 through 4 shown above using one (1) 6-ounce bottle.        6:00 pm:  For this dose, repeat Steps 1 through 4 shown above using one (1) 6-ounce bottle.     IMPORTANT: If you experience preparation-related symptoms (for example, nausea, bloating, or cramping), stop, or slow the rate of drinking the additional water until your symptoms decrease.    DOSE 3--Day of the Colonoscopy 9/26/2025  at 2-3 AM.    For this dose, repeat Steps 1 through 4 shown above using one (1) 6-ounce bottle.     You may continue drinking water/clear liquids until   4 hours before your colonoscopy or as directed by the scheduling nurse.      For information about your procedure, two (2) things to view prior to colonoscopy:  Please watch this informational video. It is important to watch this animated consent video prior to your arrival. If you haven't watched the video prior to arriving, you are required to watch it during admission which can causes delays.    Options for viewing:   Using a keyboard:   press and hold the control tab (Ctrl) and left mouse click to follow links.           Colonoscopy Instructional Video                                                                                   OR    Type link address into your web browser's address bar:  [email or web address]      Educational Booklet with pictures:      Colonoscopy Prep - Liquid

## 2025-07-24 NOTE — PROGRESS NOTES
[facility] -  Established Patient Evaluation      Chief Complaint  Chief Complaint   Patient presents with    Follow-up           7/24/2025     9:56 AM 3/27/2025     2:22 PM 10/11/2024     2:21 PM   Last 3 PDI Scores   Pain Disability Index (PDI) 49 [phone]     Interval History 7/24/2025:     Bhavna Landry is a 69 y.o. female presents to the clinic for follow up of her chronic low back pain.  She is status post caudal SOPHY on 06/12/2025.  This did help with her radicular symptoms.  Today her worst pain is located in the lower back area and radiates to the bilateral gluteal area.  The pain is described as boring, sharp, stabbing, and throbbing.  She continues taking Lyrica 50 mg q.a.m., 50 mg in the afternoon, and 100 mg at night.  She does find the Lyrica helpful.    At BEST  8/10   At WORST  9/10 on the WORST day.    On average pain is rated as 7/10.   Today the pain is rated as 8/10  Symptoms interfere with daily activity.   Exacerbating factors: Walking, Night Time, and Morning.    Mitigating factors medications.       Interval History 3/27/2025:     Bhavna Landry is a 69 y.o. female presents to the clinic for follow up of her low back pain.  She states that the Lyrica has provided her with significant relief.  She also notes that the recent caudal epidural provided on 01/29/2025 also provided her with significant improvement if her pain.  Today she requests to schedule an epidural preemptively.    The pain is located in the back, leg and buttocks area and does not radiate.  The pain is described as boring, sharp, shooting, stabbing, throbbing, and tight band    At BEST  4/10   At WORST  7/10 on the WORST day.    On average pain is rated as 4/10.   Today the pain is rated as 8/10  Symptoms interfere with daily activity.   Exacerbating factors: Walking, Night Time, and Morning.    Mitigating factors medications and epidural injection    Interval History 2/3/2025:  69-year-old female  presents virtually for a follow-up appointment she is status post a caudal SOPHY on 01/29/2025 she reports 90% relief of her left leg and left low back pain however she abruptly is experiencing low back and right hip and right anterior thigh pain that started this morning.  Denies any recent incident or trauma denies any profound weakness she does report paresthesia like symptoms in the right hip and right anterior thigh.  She denies any bowel bladder dysfunction denies saddle anesthesia at this time.      Interval Update 10/11/2024: Patient return to clinic SP Caudal SOPHY procedure done on 09/18/2024 with 100% relief with improved functionality.    Interval update 08/30/24:  Patient return to clinic for follow up on back pain.  She reports most recent right L3/L4 and L4/L5 right-sided transforaminal epidural steroid injection on 07/17/2024 did provide her with pain relief.  Her pain is gradually returning, but she still notes her pain is better than prior to injection.  Pain radiates to the right buttocks and right leg.  She reports pain over the sacrum as well.  She tried the Lyrica 50 mg prescribed by NSGY, but stated the medication sedated her.  She was interested in trying the Lyrica again at a lower dose.  Today she rates her pain 7/10.    Interval Update 06/21/2024: Patient return to clinic for follow up on back pain.  She has status post a lumbar SOPHY targeting L5-S1 on 05/22/2024 reporting 30% relief of her symptoms her daughter who is present with her did report that she was able to walk around her home without using her walker following this injection.  Despite the percentage of relief being low.  Continues to suffer from chronic right low back and right leg pain her pain starts in the low right low back radiating into her right buttocks wrapping around to the front of her right leg into her toes.  She denies any recent incident or trauma denies any falls denies any profound weakness.  She was also previously  provided a right-sided sacral lateral branch RFA and was scheduled to have the left side RFA done however her right sciatic pain has taking over her SI joint/hip pain.  I will focus my energy on trying to alleviate her right-sided sciatica type pain.  She denies taking gabapentin anymore as she felt like the medicine was causing her ankles swell.  Today she denies any profound weakness denies any bowel bladder dysfunction denies saddle anesthesia at this time.    Interval History 05/02/2024  Bhavna Landry presents today virtually for follow up her pain back.  She recently underwent right sided sacral lateral branch RFA and was scheduled to have left sided RFA performed, but her sciatica pain has flared up in the interim.  She would like to hold off on the left sacral lateral branch RFA as her right-sided radicular symptoms are more severe.  Pain radiates down the right lower extremity to the top of the foot.  She needs to use a walker to ambulate due to pain.  She has a history of lumbar radiculopathy and underwent L5/S1 interlaminar SOPHY in 08/2023 with excellent results.  She reports her current radicular symptoms are the same as prior.  She would like to repeat the lumbar epidural steroid injection.  She is currently taking gabapentin which is prescribed by her psychiatrist.  She denies any weakness in the lower extremities, saddle anesthesia, or loss of bowel or bladder function.  She currently rates her pain 8/10.       Interval History 04/02/2024:  Bhavna Landry presents today for follow up of her low back pain.  Her low back/sacral pain has returned.  In the past she has responded well to bilateral L5 Dorsal Ramus and Lateral Branches of S1, S2, and S3 RFA.  This is previously given her significant relief lasting several years.  She would like to repeat this procedure.  Denies any recent falls or trauma.  No changes to her pain.  Denies any radicular symptoms.  No weakness, saddle anesthesia, bowel or  "bladder changes.     Interval History (05/22/2023):  Bhavna Landry returns today for follow up she is s/p a a diagnostic Lumbar MBB targeting L4/5 and L5/S1 that  provided 0% relief of her low back and leg pain R>L. She would like to consider other injection that may help. She denies and new pain, denies B/B dysfunction, denies any profound weakness.     Current Pain Scales:  Current: 5/10              Typical Range: 5-9/10     Interval History (03/14/2023):  Bhavna Landry returns today for low back pain that radiates into the b/l thighs.  Pain is described as clenching pain that has worsened over last 2 years. No trauma or surgery.  Pain is worse with extension.  Pain is better with heat, lying down, gabapentin, tylenol and tramadol.  She is scheduled to start PT next week.  Currently, the bilateral hip and bilateral leg pain is stable.  Avoid NSAIDs due to history of gastric ulcer.     Current Pain Scales:  Current: 7/10              Typical Range: 7-8/10     Background from Chart Review  9/16/2020- Mrs. Landry presents to clinic today reporting left hip pain for the past 3-4 mos, which has not improved with PT.  Pain starts in the lateral left hip "on the bone" and radiates through the buttock, down the lateral thigh not passing the knee, and into the anterolateral thigh.  She endorses regular stretching and states that this pain is distinctly different from the SI pain for which she received SI RFA in the past.     6/09/2020-   64-year-old female presents status post left then right  DR 5+ lateral branches of the S1, S2 and S3 RFA with reported 100% continued  relief she reports that her pain score today is 0/10.  She reports improvement with her ADLs and overall improvement of her functionality.     05/05/2020  presents tele-medicine appointment for a follow-up appointment for low back, left groin and bilateral hip pain.  Since the last visit, Bhavna Landry states the pain has been persistant. Current pain " intensity is 9/10.  Patient reports onset of pain 2 weeks, patient states bending forward and sitting for long periods of time increase her pain.  Pain is described as aching.  She reports no radicular symptoms in either leg.  Worst pain is not out of 10.  Patient is status post right and left L5 Dorsal Ramus and Lateral Branches of S1, S2, and S3 (4 levels) RFA reported 80-90% relief for a minimal 6 months.  Pain is now returned and patient like to reschedule procedure.        Treatment History  PT/OT: yes  HEP: yes  TENS:    Injections:  - 06/12/2025-caudal epidural steroid injection-improvement in lumbar radicular symptoms  -01/29/2025 Caudal Epidural Steroid Injection 90% relief of left leg and left LBP.  - 09/18/2024Caudal Epidural Steroid Injection 100%  - 07/17/2024-right L3/L4 and L4/L5 transforaminal epidural steroid injection  -05/22/2024 Lumbar Interlaminar Epidural Steroid Injection L5/S1 30%  Bilateral SI joint injections, bilateral  Dorsal ramus block(DR5, S1,S2,S3),  bilateral L5 Dorsal Ramus and Lateral Branches of S1, S2, and S3 (4 levels) RFA, GTB injections   12/08/2023 - Cervical Interlaminar Epidural Steroid Injection C6/C7 under Fluoroscopic Guidance   08//02/2023 -  Lumbar Interlaminar Epidural Steroid Injection L5/S1   05/03/2023 -  Diagnostic Bilateral L3, L4, and L5 Lumbar Medial Branch Block under Fluoroscopy  - No relief    Surgery:  Medications:   - NSAIDS: avoid due to gastric ulcer   - MSK Relaxants:   - TCAs:   - SNRIs: Venlafaxine  - Topicals: Voltaren  - Opioids: Tramadol   - Anticonvulsants: Gabapentin    Current Pain Medications  Gabapentin  Tramadol      Full Medication List    Current Outpatient Medications:     acetaminophen (TYLENOL) 500 MG tablet, Take 1 tablet (500 mg total) by mouth every 6 (six) hours as needed., Disp: , Rfl:     albuterol (PROVENTIL) 2.5 mg /3 mL (0.083 %) nebulizer solution, Take 3 mLs (2.5 mg total) by nebulization every 6 (six) hours as needed for  Wheezing. Rescue, Disp: 90 each, Rfl: 3    albuterol (PROVENTIL/VENTOLIN HFA) 90 mcg/actuation inhaler, USE 2 INHALATIONS BY MOUTH 4  TIMES DAILY AS NEEDED, Disp: 25.5 g, Rfl: 3    amLODIPine (NORVASC) 5 MG tablet, Take 1 tablet (5 mg total) by mouth 2 (two) times a day., Disp: 180 tablet, Rfl: 3    ascorbic acid, vitamin C, (VITAMIN C) 500 MG tablet, Take 500 mg by mouth once daily., Disp: , Rfl:     aspirin (ECOTRIN) 81 MG EC tablet, Take 81 mg by mouth once daily., Disp: , Rfl:     atorvastatin (LIPITOR) 80 MG tablet, Take 1 tablet (80 mg total) by mouth once daily., Disp: 90 tablet, Rfl: 3    azelastine (ASTELIN) 137 mcg (0.1 %) nasal spray, 1 spray (137 mcg total) by Nasal route 2 (two) times daily., Disp: 90 mL, Rfl: 3    benztropine (COGENTIN) 0.5 MG tablet, Take by mouth., Disp: , Rfl:     budesonide-formoterol 80-4.5 mcg (SYMBICORT) 80-4.5 mcg/actuation HFAA, Inhale 2 puffs into the lungs 2 (two) times a day. Controller, Disp: 10.2 g, Rfl: 11    exemestane (AROMASIN) 25 mg tablet, Take 1 tablet (25 mg total) by mouth once daily., Disp: 30 tablet, Rfl: 11    fluticasone propionate (FLONASE) 50 mcg/actuation nasal spray, 1 SPRAY EACH NOSTRIL EVERY DAY, Disp: 32 g, Rfl: 2    folic acid (FOLVITE) 1 MG tablet, Take 1 tablet (1,000 mcg total) by mouth once daily., Disp: 90 tablet, Rfl: 3    HYDROcodone-acetaminophen (NORCO) 5-325 mg per tablet, Take 1 tablet by mouth every 8 (eight) hours as needed for Pain., Disp: 90 tablet, Rfl: 0    immun glob G,IgG,-pro-IgA 0-50 (HIZENTRA) 4 gram/20 mL (20 %) Soln, Inject 60 mLs (12 g total) into the skin every 14 (fourteen) days., Disp: 120 mL, Rfl: 11    isosorbide mononitrate (IMDUR) 30 MG 24 hr tablet, TAKE 1 TABLET(30 MG) BY MOUTH DAILY, Disp: 90 tablet, Rfl: 3    isosorbide mononitrate (IMDUR) 30 MG 24 hr tablet, Take 1 tablet (30 mg total) by mouth once daily., Disp: 90 tablet, Rfl: 3    ketoconazole (NIZORAL) 2 % shampoo, Wash hair with medicated shampoo at least  2x/month - let sit on scalp at least 5 minutes prior to rinsing, Disp: 120 mL, Rfl: 5    methotrexate 2.5 MG Tab, Take 8 tablets (20 mg total) by mouth every 7 days. Take 4 tabs Mon am and 4 tabs Mon  pm only, Disp: 96 tablet, Rfl: 0    omega-3 fatty acids/fish oil (FISH OIL-OMEGA-3 FATTY ACIDS) 300-1,000 mg capsule, Take by mouth once daily., Disp: , Rfl:     omeprazole (PRILOSEC) 40 MG capsule, TAKE 1 CAPSULE(40 MG) BY MOUTH EVERY MORNING, Disp: 90 capsule, Rfl: 3    oxyCODONE (ROXICODONE) 5 MG immediate release tablet, Take 1 tablet (5 mg total) by mouth every 12 (twelve) hours as needed., Disp: 10 tablet, Rfl: 0    polyethylene glycol (GLYCOLAX) 17 gram PwPk, Take 17g every other day. Can increase to daily if needed., Disp: 30 each, Rfl: 0    pregabalin (LYRICA) 100 MG capsule, Take 1 capsule (100 mg total) by mouth 3 (three) times daily., Disp: 90 capsule, Rfl: 2    risperiDONE (RISPERDAL) 2 MG tablet, TAKE 1 TABLET(2 MG) BY MOUTH EVERY MORNING, Disp: 90 tablet, Rfl: 3    risperiDONE (RISPERDAL) 4 MG tablet, Take 1 tablet (4 mg total) by mouth every evening., Disp: 90 tablet, Rfl: 3    sodium,potassium,mag sulfates (SUPREP BOWEL PREP KIT) 17.5-3.13-1.6 gram SolR, Take 177 mLs by mouth once daily., Disp: 1 kit, Rfl: 0    traMADoL (ULTRAM) 50 mg tablet, Take 1 tablet (50 mg total) by mouth every 8 (eight) hours as needed for Pain., Disp: 90 tablet, Rfl: 0    valsartan (DIOVAN) 80 MG tablet, Take 1 tablet (80 mg total) by mouth 2 (two) times daily., Disp: 180 tablet, Rfl: 3    vitamin D (VITAMIN D3) 1000 units Tab, Take 1,000 Units by mouth once daily., Disp: , Rfl:     vitamin E 200 UNIT capsule, Take 200 Units by mouth once daily., Disp: , Rfl:     zinc gluconate 50 mg tablet, Take 50 mg by mouth once daily., Disp: , Rfl:   No current facility-administered medications for this visit.    Facility-Administered Medications Ordered in Other Visits:     tropicamide 1% ophthalmic solution 1 drop, 1 drop, Right Eye, On  Call Procedure, Karen Song MD, 1 drop at 08/01/19 0648     Review of Systems  ROS  REVIEW OF SYSTEMS:    GENERAL:  No weight loss, malaise or fevers.  HEENT:   No recent changes in vision or hearing  NECK:  No difficulty with swallowing. No stridor.   RESPIRATORY:  Negative for cough, wheezing or shortness of breath, patient denies any recent URI.  CARDIOVASCULAR:  Negative for chest pain, leg swelling or palpitations. +HTN  GI:  Negative for abdominal discomfort, blood in stools or black stools or change in bowel habits.  MUSCULOSKELETAL:  See HPI.  SKIN:  Negative for lesions, rash, and itching.  PSYCH:  No mood disorder or recent psychosocial stressors.   +anxiety +depression   HEMATOLOGY/LYMPHOLOGY:  Negative for prolonged bleeding, bruising easily or swollen nodes.  Patient is not currently taking any anti-coagulants  NEURO:   No history of headaches, syncope, paralysis, seizures or tremors.  All other reviewed and negative other than HPI.      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, estrogen receptor positive 04/13/2022    Cataract     Depression     Dry eyes     Dry mouth     Duane's syndrome of right eye     Early dry stage nonexudative age-related macular degeneration of both eyes 09/20/2022    Fibromyalgia 04/17/2014    Fibromyalgia     Fractured hip     RIGHT HIP    GERD (gastroesophageal reflux disease)     History of psychiatric hospitalization     Hyperlipidemia 02/02/1992    Hypertension     Hypocalcemia     Hyponatremia     Kidney stone     Migraine headache     Osteoporosis     Pressure ulcer of unspecified site, unspecified stage     Psoriatic arthritis 02/02/1992    Recurrent upper respiratory infection (URI)     Right knee pain     post knee replacement surgery (possible rejectiion of metal)    RLS (restless legs  syndrome)     Schizophrenia 1992    stable on meds    Sciatica     Squamous cell carcinoma of skin     Urinary tract infection         Surgical History  Past Surgical History:   Procedure Laterality Date    brow ptosis repair Right 2019    Surgeon: Dr. Karla Henson    CAROTID ENDARTERECTOMY Right 2025    Procedure: ENDARTERECTOMY-CAROTID;  Surgeon: SLIME Johnson III, MD;  [facility];  Service: Vascular;  Laterality: Right;    CATARACT EXTRACTION      CAUDAL EPIDURAL STEROID INJECTION N/A 2024    Procedure: Caudal SOPHY;  Surgeon: Maile Storm DO;  Location: [facility];  Service: Pain Management;  Laterality: N/A;  ASA ok    CAUDAL EPIDURAL STEROID INJECTION N/A 2025    Procedure: Caudal SOPHY;  Surgeon: Maile Storm DO;  Location: [facility];  Service: Pain Management;  Laterality: N/A;  asa ok     SECTION      COLONOSCOPY N/A 2016    Procedure: COLONOSCOPY;  Surgeon: ANDRIA Connelly MD;  Location: [facility] (4[facility] F[facility]);  Service: Endoscopy;  Laterality: N/A;    COLONOSCOPY N/A 2022    Procedure: COLONOSCOPY;  Surgeon: Mikey Walters MD;  Location: [facility] END[facility];  Service: Endoscopy;  Laterality: N/A;    cyst removed from right sinus  1982    EPIDURAL STEROID INJECTION INTO CERVICAL SPINE N/A 2023    Procedure: C6-7 SOPHY;  Surgeon: Spring Jensen MD;  Location: [facility];  Service: Pain Management;  Laterality: N/A;  20 mins    EPIDURAL STEROID INJECTION INTO LUMBAR SPINE N/A 2023    Procedure: Injection-steroid-epidural-lumbar L5-S1;  Surgeon: Chirag Kim MD;  Location: [facility];  Service: Pain Management;  Laterality: N/A;  asa    EPIDURAL STEROID INJECTION INTO LUMBAR SPINE N/A 2024    Procedure: L5/S1 Interlaminar SOPHY;  Surgeon: Maile Storm DO;  Location: [facility];  Service: Pain Management;  Laterality: N/A;  20mins-ASA 5d    EPIDURAL STEROID INJECTION INTO  LUMBAR SPINE N/A 6/12/2025    Procedure: Caudal SOPHY;  Surgeon: Maile Storm DO;  Location: [facility];  Service: Pain Management;  Laterality: N/A;  ASA ok    ESOPHAGOGASTRODUODENOSCOPY N/A 02/07/2023    Procedure: EGD (ESOPHAGOGASTRODUODENOSCOPY);  Surgeon: Dougie Shea MD;  Location: [facility] ENDO;  Service: Endoscopy;  Laterality: N/A;    FOOT FRACTURE SURGERY      HYSTERECTOMY      VAGINAL HYSTERECTOMY WITHOUT BSO - ENDOMETRIOSIS    INJECTION FOR SENTINEL NODE IDENTIFICATION Right 05/09/2022    Procedure: INJECTION, FOR SENTINEL NODE IDENTIFICATION-Right;  Surgeon: NEAL Dhillon MD;  Location: [facility] OR;  Service: General;  Laterality: Right;    INJECTION OF ANESTHETIC AGENT AROUND MEDIAL BRANCH NERVES INNERVATING LUMBAR FACET JOINT N/A 04/11/2019    Procedure: Lumbo-sacral Block, DR5 and Lateral Branches of S1,S2, S3;  Surgeon: Michelle Pineda Jr., MD;  Location: [facility];  Service: Pain Management;  Laterality: N/A;  Pt takes  and states she holds ASA on her own whenever she has procedures.  Instructed to hold x 3 days prior to procedure.      INJECTION OF ANESTHETIC AGENT AROUND MEDIAL BRANCH NERVES INNERVATING LUMBAR FACET JOINT Bilateral 05/03/2023    Procedure: Block-nerve-medial branch-lumbar bilateral L3, L4, L5;  Surgeon: Maile Storm DO;  Location: [facility];  Service: Pain Management;  Laterality: Bilateral;  asa    INJECTION OF ANESTHETIC AGENT INTO SACROILIAC JOINT Right 08/30/2018    Procedure: BLOCK, SACROILIAC JOINT-Right- ORAL SEDATION;  Surgeon: Michelle Pineda Jr., MD;  Location: [facility];  Service: Pain Management;  Laterality: Right;    INJECTION OF ANESTHETIC AGENT INTO SACROILIAC JOINT Bilateral 09/27/2018    Procedure: BLOCK, SACROILIAC JOINT-BILATERAL;  Surgeon: Michelle Pineda Jr., MD;  Location: [facility];  Service: Pain Management;  Laterality: Bilateral;  Please keep at 10:00 due to trasnsportation    INJECTION OF ANESTHETIC AGENT  INTO SACROILIAC JOINT Bilateral 02/07/2019    Procedure: Bilateral Sacroiliac Joint Injection - Per Dr Pineda, not necessary to hold ASA.;  Surgeon: Michelle Pineda Jr., MD;  Location: [facility] PAIN T;  Service: Pain Management;  Laterality: Bilateral;    INJECTION, SPINE, LUMBOSACRAL, TRANSFORAMINAL APPROACH Right 7/17/2024    Procedure: Right  L3/4  and L4/5 TFESI;  Surgeon: Maile Storm DO;  Location: [facility];  Service: Pain Management;  Laterality: Right;  20 mins  ASA 5 days    INTRAOCULAR PROSTHESES INSERTION Right 08/01/2019    Procedure: INSERTION, IOL PROSTHESIS;  Surgeon: Karen Song MD;  Location: [facility] OR [address] [place] [address];  Service: Ophthalmology;  Laterality: Right;    INTRAOCULAR PROSTHESES INSERTION Left 09/26/2019    Procedure: INSERTION, IOL PROSTHESIS;  Surgeon: Karen Song MD;  Location: [address];  Service: Ophthalmology;  Laterality: Left;    JOINT REPLACEMENT Right     knee    KNEE SURGERY      MASTECTOMY Right 05/09/2022    Procedure: MASTECTOMY-Right;  Surgeon: NEAL Dhillon MD;  Location: [facility];  Service: General;  Laterality: Right;  2.5 HOURS    PHACOEMULSIFICATION OF CATARACT Right 08/01/2019    Procedure: PHACOEMULSIFICATION, CATARACT;  Surgeon: Karen Song MD;  Location: [address];  Service: Ophthalmology;  Laterality: Right;    PHACOEMULSIFICATION OF CATARACT Left 09/26/2019    Procedure: PHACOEMULSIFICATION, CATARACT;  Surgeon: Karen Song MD;  Location: [facility] OR [address] [place] [address];  Service: Ophthalmology;  Laterality: Left;    RADIOFREQUENCY ABLATION, NERVE, PERIPHERAL Right 04/24/2024    Procedure: RIGHT L5 Dorsal Ramus and RIGHT Lateral Branches of S1, S2, and S3;  Surgeon: Maile Storm DO;  Location: [facility];  Service: Pain Management;  Laterality: Right;  30 mins    RADIOFREQUENCY THERMOCOAGULATION Right 05/07/2019    Procedure: RADIOFREQUENCY THERMAL COAGULATION RIGHT DORSAL RAMUS 5 AND LATERAL BRANCH OF S1, S2 AND  "S3;  Surgeon: Michelle Pineda Jr., MD;  Location: [facility];  Service: Pain Management;  Laterality: Right;    RADIOFREQUENCY THERMOCOAGULATION Left 05/14/2019    Procedure: RADIOFREQUENCY THERMAL COAGULATION LEFT DORSAL RAMUS 5 AND LATERAL BRANCH OF S1,S2 AND S3;  Surgeon: Michelle Pineda Jr., MD;  Location: [facility];  Service: Pain Management;  Laterality: Left;    RADIOFREQUENCY THERMOCOAGULATION Right 10/22/2019    Procedure: RADIOFREQUENCY THERMAL COAGULATION---DARSAL RAMUS 5 and LATERAL S1,S2,and S3 Right;  Surgeon: Michelle Pineda Jr., MD;  Location: [facility];  Service: Pain Management;  Laterality: Right;  patient to sign consent DOS    RADIOFREQUENCY THERMOCOAGULATION Left 10/29/2019    Procedure: RADIOFREQUENCY THERMAL COAGULATION - LEFT - DR5, S1,S2, AND S3;  Surgeon: Michelle Pineda Jr., MD;  Location: [facility];  Service: Pain Management;  Laterality: Left;    RADIOFREQUENCY THERMOCOAGULATION Left 05/26/2020    Procedure: RADIOFREQUENCY THERMAL COAGULATION--Left DR5+ lateral branches of S1, S2, S3;  Surgeon: Michelle Pineda Jr., MD;  Location: [facility];  Service: Pain Management;  Laterality: Left;    RADIOFREQUENCY THERMOCOAGULATION Right 06/02/2020    Procedure: RADIOFREQUENCY THERMAL COAGULATION--Right DR5+ lateral branches of S1, S2, S3;  Surgeon: Michelle Pineda Jr., MD;  Location: [facility];  Service: Pain Management;  Laterality: Right;    SENTINEL LYMPH NODE BIOPSY Right 05/09/2022    Procedure: BIOPSY, LYMPH NODE, SENTINEL-Right;  Surgeon: NEAL Dhillon MD;  Location: [facility];  Service: General;  Laterality: Right;    SINUS SURGERY      Surgery on right knee  07/09/1982    TONSILLECTOMY      tumor removed from back left side upper shoulder  03/17/2006        Physical Exam  Vitals:    07/24/25 0948   BP: (!) 180/71   Pulse: 67   Weight: 45.6 kg (100 lb 8.5 oz)   Height: 5' 1" (1.549 m)   PainSc:   8   PainLoc: Back     GENERAL: Well appearing, in no acute " distress, alert and oriented x3.  PSYCH:  Mood and affect appropriate.  SKIN: Skin color, texture, turgor normal, no rashes or lesions.  HEAD/FACE:  Normocephalic, atraumatic. Cranial nerves grossly intact.  NECK: Normal ROM. Supple.   CV: RRR with palpation of the radial artery.  PULM: No evidence of respiratory difficulty, symmetric chest rise.  GI:  Soft and non-distended.  MSK: Straight leg raising is negative to radicular pain. There is pain to palpation over the facet joints of the lumbar spine. There is pain with lumbar facet loading.  Normal range of motion without pain reproduction with lumbar flexion. Pain with extension.  Peripheral joint ROM is full and pain free without obvious instability or laxity in all four extremities. No deformities, edema, or skin discoloration.  No atrophy or tone abnormalities are noted.   NEURO: Bilateral upper and lower extremity coordination and strength is symmetric.  No loss of sensation is noted.  MENTAL STATUS: A x O x 3, good concentration, speech is fluent and goal directed  MOTOR: 5/5 in all muscle groups  GAIT:  Antalgic..  Ambulates unassisted.    Imaging  MRI LUMBAR SPINE WITHOUT CONTRAST     CLINICAL HISTORY:  Low back pain, symptoms persist with > 6wks conservative treatment; Dorsalgia, unspecified     TECHNIQUE:  Multiplanar, multisequence MR images were acquired from the thoracolumbar junction to the sacrum without the administration of contrast.     COMPARISON:  CT lumbar spine dated 05/31/2024 and MRI lumbar spine dated 03/11/2023     FINDINGS:  Lumbar spine vertebral body heights appear maintained with thoracolumbar levocurvature.  Variable multilevel disc space narrowing and Modic type 1 endplate changes.  The spinal cord terminus lies near L2-L3.  Probable thin fatty filum terminale, similar. Remaining visualized prevertebral and paraspinal soft tissues demonstrate no acute abnormality.     T12-L1: Circumferential bulge without significant spinal canal  stenosis or neural foraminal impingement.     L1-L2: Circumferential bulge with facet arthropathy.  Mild flavum buckling and partial flattening of the ventral thecal sac.  Right neural foraminal encroachment.     L2-L3: Circumferential bulge and facet arthropathy.  No significant spinal canal stenosis.  Mild neural foraminal narrowing.     L3-L4: Circumferential bulge with large superimposed left paracentral extrusion with approximate 0.8 cm inferior migration.  Effacement of the left paracentral thecal sac and left lateral recess and severe left neural foraminal narrowing.     L4-L5: Circumferential bulge with superimposed left paracentral extrusion with superior migration of approximately 1.0 cm.  Flavum thickening and mild facet DJD contributing to mild spinal canal stenosis.  Mild right and moderate left neural foraminal narrowing.     L5-S1: Circumferential bulge and facet DJD with flavum thickening.  Superimposed right subarticular extrusion with inferior migration demonstrating slight variable T2 signal to the parent disc.  Findings contribute to lateral recess narrowing bilaterally with mild-moderate spinal canal stenosis.  Additional abutment and probable encroachment of the right transiting S1 nerve root.  Moderate-severe left and severe right neural foraminal narrowing.     Impression:     Thoracolumbar scoliosis with multilevel lumbar spondylosis.  Detailed findings on a level by level basis as above.        Electronically signed by:Henok Damon  Date:                                            07/16/2024    Labs:  BMP  Lab Results   Component Value Date     (L) 06/10/2025    K 4.8 06/10/2025    CL 97 06/10/2025    CO2 26 06/10/2025    BUN 8 06/10/2025    CREATININE 0.8 06/10/2025    CALCIUM 9.2 06/10/2025    ANIONGAP 9 06/10/2025    EGFRNORACEVR >60 06/10/2025     Lab Results   Component Value Date    ALT 16 06/10/2025    AST 26 06/10/2025    GGT 91 (H) 07/28/2014    ALKPHOS 65 06/10/2025     BILITOT 0.2 06/10/2025       Assessment:  Problem List Items Addressed This Visit          Neuro    Chronic pain    Lumbar spondylosis - Primary    Relevant Orders    Ambulatory Referral/Consult to Physical Therapy    Procedure Request Order for Pain Management     Other Visit Diagnoses         Lumbar radiculopathy        Relevant Orders    Ambulatory Referral/Consult to Physical Therapy      Lumbosacral radiculopathy              03/14/2023 -Bhavna Landry is a 69 y.o. 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, estrogen receptor positive (04/13/2022), Cataract, Depression, Dry eyes, Dry mouth, Duane's syndrome of right eye, Early dry stage nonexudative age-related macular degeneration of both eyes (09/20/2022), Fibromyalgia (04/17/2014), Fibromyalgia, Fractured hip, GERD (gastroesophageal reflux disease), History of psychiatric hospitalization, Hyperlipidemia (02/02/1992), Hypertension, Hypocalcemia, Hyponatremia, Kidney stone, Migraine headache, Osteoporosis, Pressure ulcer of unspecified site, unspecified stage, Psoriatic arthritis (02/02/1992), Recurrent upper respiratory infection (URI), Right knee pain, RLS (restless legs syndrome), Schizophrenia (02/02/1992), Sciatica, Squamous cell carcinoma of skin, and Urinary tract infection.  By history and examination this patient has chronic low back pain without radiculopathy.  The underlying cause cause is facet arthritis and deconditioning.  Pathology is confirmed by imaging.  We discussed the underlying diagnoses and multiple treatment options including non-opioid medications, interventional procedures, physical therapy, and home exercise.  The risks and benefits of each treatment option were discussed and all questions were answered.      5/22/2023- 68 y/o female with a Hx of chronic low back  and bilateral leg pain she is  s/p a diagnosis lumbar MBB targeting L4/5 and L5/S1 reporting 0% relief of her symptoms. Her Lumbar MRI multilevel disc bulges starting   at L3 through S1.  She has severe right, moderate left neural foraminal narrowing at L5-S1 thta may be causing her Low back and leg pain.  Her pain was refractory to diagnositic low lumbar MBB so i will attempt a L4-5 Lumbar SOPHY.     04/02/2024 Bhavna Landry presents for follow up of her low back/SI joint pain.  Patient has previously undergone bilateral L5 Dorsal Ramus and Lateral Branches of S1, S2, and S3 RFA with significant and lasting relief.  Prior RFA lasted about 2 years.  She would like to repeat the procedure as the pain has now returned.    05/02/2024 - Patient presents virtually today for follow up of her low back pain.  Today her biggest complaint is radicular symptoms primarily on the right.  Patient has a history of lumbar radiculopathy and previously underwent an L5/S1 epidural steroid injection in 08/2023 with excellent relief.  Her pain has returned and she would like to repeat the epidural steroid injection.  She would like to hold off on the left-sided sacral RFA for now until we can address her radicular symptoms as they are more severe.  Discussed with the patient that she may benefit from a surgical consult if epidural does not provide persistent relief.    6/21/2024- 69 y/o female presents for continued pain in the right low back right buttocks and right leg that radiates into her right foot. Her recent L5-S1 IESI provided 30% relief of her symptoms.  Her MRI significant for disc bulges throughout the lower lumbar spine L3 through S1 she has severe right moderate left neural foraminal narrowing at L5-S1 that may be causing right low back and right leg pain.  Today we discussed focusing her pain interventions on the right side in effort to reduce some of the right-sided symptoms as she does not complain about pain in the left lumbar or left  leg.    08/30/2024 - patient presents for follow up of her low back pain with right-sided radicular symptoms.  Most recent right transforaminal epidural steroid injection at L3/L4 and L4/L5 did provide her with some relief..  She continues to have pain that radiates into the buttocks and down the leg as well as pain over the sacrum.  We discussed perform a caudal epidural steroid injection.  Patient was amenable to this plan.    [birth year]-85-vsoy-old female with a history of chronic low back with right radiculopathy.  Recently provided a caudal SOPHY on 09/18/2024 that provided 100% relief of her symptoms with improved functionality.  Patient has completed physical therapy in the past recommended that she establish a home exercise plan utilizing the exercises she learned in physical therapy focusing on stretching and muscular strengthening to help with lumbar stabilization.    2/3/2025-Ms Landry presented virtually with a subacute pain described as aching throbbing and sharp in her right low back and right hip radiating into her right anterior thigh onset was this morning.  Etiology of her pain is unclear at this time she was previously taking Lyrica 50 mg  q.h.s. she reports that she is not taking this medication in some time her and I discussed I will optimize her medication and start her on 25 mg q.h.s. gradually increasing this medication to t.i.d. she verbalized understanding of how I want her to take this medication, she denied any previous side effects with it.     03/27/2025-patient presents today for follow up of her low back pain.  She has responded well to Lyrica as well as caudal epidural steroid injection.  The patient was requesting to preemptively schedule another caudal epidural.  I educated the patient that a prophylactic epidural will not prevent her pain.  Recommend that she increase her Lyrica to 50 mg t.i.d. if tolerated.  Should her pain return, we can certainly consider repeating caudal  epidural, , but explained that pretreating with an epidural was not an option.    07/24/2025-patient presents for follow up of her low back pain.  She did notice improvement in her radicular symptoms following most recent caudal epidural steroid injection.  Today her low back pain appears to be most consistent with lumbar facetogenic pain.  I am recommending lumbar medial branch blocks of bilateral L3, L4, L5.  If good response to diagnostic blocks, proceed with RFA.  Also recommended increasing Lyrica if tolerable.  Currently taking 50 mg q.a.m., 50 mg afternoon, and 100 mg q.h.s..  Discussed gradually increasing to 100 mg t.i.d. if tolerable versus increasing just the bedtime dose if daytime increases make her too drowsy.      Treatment Plan:   Procedures:   Schedule patient for bilateral diagnostic medial branch blocks L3, L4, L5.  Proceed to RFA if appropriate response to diagnostic blocks.  PT/OT/HEP: I have stressed the importance of physical activity and a home exercise plan to help with pain and improve health.  Referral to physical therapy.  Medications:    - Discussed gradually increasing her Lyrica to 100 mg t.i.d. if tolerable versus increasing just the bedtime dose if daytime increases make her too drowsy.   -  Reviewed and consistent with medication use as prescribed.  Imaging:  Available imaging reviewed.    Follow Up:  MyChart or phone call follow up after 1st diagnostic block to assess response to injection and next steps in ongoing management of these chronic pain conditions    Maile Storm,    Interventional Pain Management    Disclaimer: This note was partly generated using dictation software which may occasionally result in transcription errors.

## 2025-07-24 NOTE — TELEPHONE ENCOUNTER
Endoscopy Scheduling Questionnaire    Are you taking any blood thinners? no      2.  Are you taking a GLP-1 Agonist? no      3.  Are you taking Adipex? no      4.  Are you on dialysis or have Kidney Disease? no    5.  Have you been diagnosed with Diverticulitis in the past three months? no    6.  Are you a diabetic? no    7. Implantable Devices? no    8.  Do you have a history of constipation? yes    9.  Previous Colonoscopy? yes     If yes, did you have polyps? yes     What prep did you use? GOLYTELY     How did it work? YES    10.  Family history of colon cancer? yes     If yes, whom? mother      Prep instructions sent to patient via My Chart    Prep - SUPREP    Prep RX sent to [facility] in [place]        Procedure scheduled with Dr. Heller  on  9/26/2025

## 2025-07-24 NOTE — TELEPHONE ENCOUNTER
----- Message from Nani Storm sent at 2025 10:23 AM CDT -----  Regarding: Order for PHUONG HAZEL    Patient Name: PHUONG HAZEL([id number])  Sex: Female  : 1955      PCP: MILLIE TELLO    Center: [facility] CENTRAL BILLING OFFICE     Types of orders made on 2025: Outpatient Referral, Procedure Request    Order Date:2025  Ordering User:NANI STORM [[id number]]  Encounter Provider:Nani Storm DO [[id number]]  Authorizing Provider: Nani Storm DO [[id number]]  Department:[facility][[id number]]    Common Order Information  Procedure -> Medial Branch Block (Specify level and laterality) Cmt: B/l L3,             L4, L5    Pre-op Diagnosis -> Lumbar spondylosis     Order Specific Information  Order: Procedure Request Order for Pain Management [Custom: [id number]]  Order #:          [id number]: 1 FUTURE    Priority: Routine  Class: Clinic Performed    Future Order Information      Expires on:2026            Expected by:2025                   Associated Diagnoses      M47.816 Lumbar spondylosis      Physician -> Emeterio         Facility Name: -> [place]         Follow-up: Cmt: MyChart or Phone call          Priority: Routine  Class: Clinic Performed    Future Order Information      Expires on:2026            Expected by:2025                   Associated Diagnoses      M47.816 Lumbar spondylosis      Procedure -> Medial Branch Block (Specify level and laterality) Cmt: B/l                 L3, L4, L5        Physician -> Emeterio         Pre-op Diagnosis -> Lumbar spondylosis         Facility Name: -> [facility] [facility]         Follow-up: Cmt: MyChart or Phone call

## 2025-07-25 ENCOUNTER — TELEPHONE (OUTPATIENT)
Dept: GASTROENTEROLOGY | Facility: CLINIC | Age: 70
End: 2025-07-25
Payer: MEDICARE

## 2025-07-25 ENCOUNTER — OFFICE VISIT (OUTPATIENT)
Dept: URGENT CARE | Facility: CLINIC | Age: 70
End: 2025-07-25
Payer: MEDICARE

## 2025-07-25 VITALS
BODY MASS INDEX: 18.88 KG/M2 | HEIGHT: 61 IN | RESPIRATION RATE: 16 BRPM | SYSTOLIC BLOOD PRESSURE: 130 MMHG | HEART RATE: 78 BPM | OXYGEN SATURATION: 97 % | WEIGHT: 100 LBS | DIASTOLIC BLOOD PRESSURE: 70 MMHG | TEMPERATURE: 99 F

## 2025-07-25 DIAGNOSIS — J01.90 ACUTE BACTERIAL RHINOSINUSITIS: Primary | ICD-10-CM

## 2025-07-25 DIAGNOSIS — J02.9 SORE THROAT: ICD-10-CM

## 2025-07-25 DIAGNOSIS — B96.89 ACUTE BACTERIAL RHINOSINUSITIS: Primary | ICD-10-CM

## 2025-07-25 DIAGNOSIS — H66.91 RIGHT OTITIS MEDIA, UNSPECIFIED OTITIS MEDIA TYPE: ICD-10-CM

## 2025-07-25 RX ORDER — CETIRIZINE HYDROCHLORIDE 10 MG/1
10 TABLET ORAL DAILY
Qty: 30 TABLET | Refills: 0 | Status: SHIPPED | OUTPATIENT
Start: 2025-07-25 | End: 2025-08-24

## 2025-07-25 RX ORDER — AMOXICILLIN AND CLAVULANATE POTASSIUM 875; 125 MG/1; MG/1
1 TABLET, FILM COATED ORAL EVERY 12 HOURS
Qty: 14 TABLET | Refills: 0 | Status: SHIPPED | OUTPATIENT
Start: 2025-07-25 | End: 2025-08-01

## 2025-07-25 RX ORDER — LIDOCAINE HYDROCHLORIDE 20 MG/ML
SOLUTION OROPHARYNGEAL EVERY 4 HOURS
Qty: 100 ML | Refills: 0 | Status: SHIPPED | OUTPATIENT
Start: 2025-07-25

## 2025-07-25 RX ORDER — FLUTICASONE PROPIONATE 50 MCG
1 SPRAY, SUSPENSION (ML) NASAL 2 TIMES DAILY PRN
Qty: 16 G | Refills: 0 | Status: SHIPPED | OUTPATIENT
Start: 2025-07-25

## 2025-07-25 NOTE — TELEPHONE ENCOUNTER
----- Message from Mireya Hudson NP sent at 7/23/2025 12:27 PM CDT -----  Double with Dr. Shea. Konradep

## 2025-07-25 NOTE — PATIENT INSTRUCTIONS
The CDC recommends should the patient develop a fever or starts to feel worse after they have returned to normal activities, they should return home and away from others for at least another 24 hours.     Please drink plenty of fluids.  Please get plenty of rest.  Please return here or go to the Emergency Department for any concerns or worsening of condition.  If you were prescribed augmentin, please take them to completion.  If you were prescribed a narcotic medication, do not drive or operate heavy equipment or machinery while taking these medications.  Use flonase nasal spray twice daily (use 30 minutes before bedtime at night) and take daily zyrtec as directed for five-ten days. Use oral lidocaine as needed for sore throat. Swish and spit or gargle and spit 10-15 mL every 4 hours as needed for sore throat. Please do not swallow.       Recommended for patient to drink hot tea with honey. Consider eating softer foods such as soup and broth for the next couple of days to prevent further throat irritation. Recommended for patient to refrain from acidic foods (such as tomatoes or caffeine) to prevent throat irritation for the next couple of days.   Recommend switching out tooth brushes once patient feels better. Recommend cleaning house using disinfectant and washing sheets once patient is healed. Recommend healthy diet, smoothie detox, and tumeric or ginger juice shots either while patient is sick or after patient has healed to help with overall inflammation and immunity support.   Recommend humidifier, hot showers for sinus drainage. Recommend elevating your head at night with two pillows to prevent post nasal drip and cough at night. Recommend over the counter benadryl allergy plus congestion that includes a nasal decongestant in it if you do not have good results with nasal spray at bed time.     If you do not have Hypertension or any history of palpitations, it is ok to take over the counter Sudafed or Mucinex D  or Allegra-D or Claritin-D or Zyrtec-D.  If you do take one of the above, it is ok to combine that with plain over the counter Mucinex or Allegra or Claritin or Zyrtec.  If for example you are taking Zyrtec -D, you can combine that with Mucinex, but not Mucinex-D.  If you are taking Mucinex-D, you can combine that with plain Allegra or Claritin or Zyrtec.   If you do have Hypertension or palpitations, it is safe to take Coricidin HBP for relief of sinus symptoms.  If not allergic, please take over the counter Tylenol (Acetaminophen) and/or Motrin (Ibuprofen) as directed for control of pain and/or fever.  Please follow up with your primary care doctor or specialist as needed.    If you  smoke, please stop smoking.

## 2025-07-25 NOTE — PROGRESS NOTES
"Subjective:      Patient ID: Bhavna Landry is a 70 y.o. female.    Vitals:  height is 5' 1" (1.549 m) and weight is 45.4 kg (100 lb). Her oral temperature is 98.8 °F (37.1 °C). Her blood pressure is 130/70 and her pulse is 78. Her respiration is 16 and oxygen saturation is 97%.     Chief Complaint: Sinus Problem    70-year-old female presents to the clinic today with chief complaint of cough, post nasal drip, right ear pain,  and congestion. Symptoms started one week ago and have not improved.  Patient has not taken any medications for sx  relief.  Denies any recent ill exposures. Denies any recent travel.  Denies history of seasonal allergies. She does have a hx of asthma.  Denies any pain or radiation of pain. Denies fever, chills, body aches, chest pain, shortness of breath, wheezing, abdominal pain, nausea, vomiting, diarrhea, or rashes.      Sinus Problem  Associated symptoms include congestion, coughing and ear pain. Pertinent negatives include no chills, diaphoresis, shortness of breath, sinus pressure or sore throat.     Constitution: Negative for activity change, appetite change, chills, sweating, fatigue, fever, generalized weakness and international travel in last 60 days.   HENT:  Positive for ear pain, congestion and postnasal drip. Negative for sinus pain, sinus pressure, sore throat and trouble swallowing.    Cardiovascular:  Negative for chest pain.   Respiratory:  Positive for cough and asthma. Negative for chest tightness, sputum production, shortness of breath and wheezing.    Gastrointestinal:  Negative for abdominal pain, nausea, vomiting and diarrhea.   Musculoskeletal:  Negative for muscle ache.   Skin:  Negative for rash.   Allergic/Immunologic: Positive for asthma. Negative for environmental allergies and seasonal allergies.      Objective:     Physical Exam   Constitutional: She is oriented to person, place, and time. She appears well-developed. She is cooperative.  Non-toxic appearance. " She appears ill. No distress.   HENT:   Head: Normocephalic and atraumatic.   Ears:   Right Ear: Hearing, external ear and ear canal normal. Tympanic membrane is injected and bulging. A middle ear effusion (purulent) is present.   Left Ear: Hearing, external ear and ear canal normal. A middle ear effusion is present.   Nose: Rhinorrhea present. No mucosal edema, purulent discharge or nasal deformity. No epistaxis. Right sinus exhibits frontal sinus tenderness. Right sinus exhibits no maxillary sinus tenderness. Left sinus exhibits frontal sinus tenderness. Left sinus exhibits no maxillary sinus tenderness.   Mouth/Throat: Uvula is midline, oropharynx is clear and moist and mucous membranes are normal. No trismus in the jaw. Normal dentition. No uvula swelling. Cobblestoning present. No oropharyngeal exudate, posterior oropharyngeal edema or posterior oropharyngeal erythema.   Eyes: Conjunctivae and lids are normal. No scleral icterus. Extraocular movement intact   Neck: Trachea normal and phonation normal. Neck supple. No edema present. No erythema present. No neck rigidity present.   Cardiovascular: Normal rate, regular rhythm, normal heart sounds and normal pulses.   Pulmonary/Chest: Effort normal and breath sounds normal. No stridor. No respiratory distress. She has no decreased breath sounds. She has no wheezes. She has no rhonchi. She has no rales.   Abdominal: Normal appearance.   Musculoskeletal: Normal range of motion.         General: No deformity. Normal range of motion.   Lymphadenopathy:     She has cervical adenopathy.   Neurological: She is alert and oriented to person, place, and time. She exhibits normal muscle tone. Coordination normal.   Skin: Skin is warm, dry, intact, not diaphoretic and not pale.   Psychiatric: Her speech is normal and behavior is normal. Judgment and thought content normal.   Nursing note and vitals reviewed.      Assessment:     1. Acute bacterial rhinosinusitis    2. Sore  throat    3. Right otitis media, unspecified otitis media type        Plan:       Acute bacterial rhinosinusitis  -     cetirizine (ZYRTEC) 10 MG tablet; Take 1 tablet (10 mg total) by mouth once daily.  Dispense: 30 tablet; Refill: 0  -     fluticasone propionate (FLONASE) 50 mcg/actuation nasal spray; 1 spray (50 mcg total) by Each Nostril route 2 (two) times daily as needed for Rhinitis.  Dispense: 16 g; Refill: 0  -     amoxicillin-clavulanate 875-125mg (AUGMENTIN) 875-125 mg per tablet; Take 1 tablet by mouth every 12 (twelve) hours. for 7 days  Dispense: 14 tablet; Refill: 0    Sore throat  -     LIDOcaine viscous HCl 2% (XYLOCAINE) 2 % Soln; by Mucous Membrane route every 4 (four) hours. Swish and spit or gargle and spit 15 mL every 4 hours as needed for sore throat. Please do not swallow.  Dispense: 100 mL; Refill: 0    Right otitis media, unspecified otitis media type  -     cetirizine (ZYRTEC) 10 MG tablet; Take 1 tablet (10 mg total) by mouth once daily.  Dispense: 30 tablet; Refill: 0  -     amoxicillin-clavulanate 875-125mg (AUGMENTIN) 875-125 mg per tablet; Take 1 tablet by mouth every 12 (twelve) hours. for 7 days  Dispense: 14 tablet; Refill: 0

## 2025-07-25 NOTE — LETTER
[facility]  [address]  [address]  [place] [address]  Phone: [phone]           IMPORTANT INFORMATION TO KNOW BEFORE YOUR PROCEDURE    [facility] 2nd Floor         If your procedure requires the administration of anesthesia, it is necessary for a responsible adult to drive you home. (Medical Transportation, Uber, Lyft, Taxi, etc. may ONLY be used if a responsible adult is present to accompany you home.  The responsible adult CAN'T be the  of the service).      person must be available to return to pick you up within 15 minutes of being notified of discharge.       Please bring a picture ID, insurance card, & copayment      Take Medications as directed below:      If you begin taking any blood thinning medications or injectable weight loss/diabetes medications (other than insulin) , please contact the endoscopy scheduling department listed below as soon as possible.    If you are diabetic see the attached instruction sheet regarding your medication.     If you take HEART, BLOOD PRESSURE, SEIZURE, PAIN, LUNG (including inhalers/nebulizers), ANTI-REJECTION (transplant patients), or PSYCHIATRIC medications, please take at your regular times with a sip of water or as directed by the scheduling nurse.     Important contact information:    Endoscopy [phone] Hours of operation Monday-Friday 8:00-4:30pm.    Questions about insurance or financial obligations call [phone] or [phone].    If you have questions regarding the prep or need to reschedule, please call [phone]. After hours questions requiring immediate assistance, contact [facility] nurse line at [phone] or [phone].   NOTE:     On occasion, unforeseen circumstances may cause a delay in your procedure start time. We respect your time and appreciate your patience during these circumstances.    Colonoscopy Procedure Prep Instructions        Date of  procedure: Sept/16/2025 with  arrival time is 11:30am    Location of Department:   [facility] [address] [place] [address]   Take the Elevators to 2nd Floor Endoscopy Procedural Area      As soon as possible:   your prep from pharmacy and over the counter DULCOLAX LAXATIVE TABLETS     What You Can do:  You may have clear liquids ONLY-see below for list.     Liquids That Are OK to Drink:   Water  Sports drinks (Gatorade, Power-Aid)  Coffee or tea (no cream or nondairy creamer)  Clear juices without pulp (apple, white grape)  Gelatin desserts (no fruit or toppings)  Clear soda (sprite, coke, ginger ale)  Chicken broth (until 12 midnight the night before procedure)    What You CANNOT do:   Do not EAT solid food, drink milk or anything   colored red.  Do not drink alcohol.  Do not take oral medications within 1 hour of starting each dose of SUPREP.  No gum chewing or candy morning of procedure.    Note:   You will be picking up (KIT) from the pharmacy.   Please disregard the insert instructions from pharmacy).  SUPREP Bowel Prep Kit is indicated for cleansing of the colon as a preparation for colonoscopy in adults.   Be sure to tell your doctor about all the medicines you take, including prescription and non-prescription medicines, vitamins, and herbal supplements. SUPREP Bowel Prep Kit may affect how other medicines work.  Medication taken by mouth may not be absorbed properly when taken within 1 hour before the start of each dose of SUPREP Bowel Prep Kit.  It is not uncommon to experience some abdominal cramping, nausea and/or vomiting when taking the prep. If you have nausea and/or vomiting while taking the prep, stop drinking for 20 to 30 minutes then continue.      How to take prep:   DOSE 1--Day Before Colonoscopy    Drink at least 6 to 8 glasses of clear liquids from time you wake up until you begin your prep and then continue until bedtime to avoid dehydration.     12:00  pm (NOON) Take four (4) Dulcolax (Bisacodyl) tablets with at least 8 oz. or more of clear liquids.    At 6:00 pm:  You must complete Steps 1 through 4 using one (1)   6-ounce bottle before going to bed as shown below:    Step 1-Pour ONE (1) 6-ounce bottle of SUPREP liquid into the mixing container  Step 2-Add cool drinking water to the 16-ounce line on the container and mix  Step 3-Drink ALL the liquid in the container.  Step 4-You must drink two (2) more 16-ounce containers of water over the next 1 hour    DOSE 2--Day of the Colonoscopy at 2-3 AM.    For this dose, repeat Steps 1 through 4 shown above using one (1) 6-ounce bottle.     You may continue drinking water/clear liquids until   4 hours before your colonoscopy or as directed by the scheduling nurse.      For information about your procedure, two (2) things to view prior to colonoscopy:  Please watch this informational video. It is important to watch this animated consent video prior to your arrival. If you haven't watched the video prior to arriving, you are required to watch it during admission which can causes delays.    Options for viewing:   Using a keyboard:  press and hold the control tab (Ctrl) and left mouse click to follow links.           Colonoscopy Instructional Video                                                                                   OR    Type link address into your web browser's address bar:  [email or web address]      Educational Booklet with pictures:      Colonoscopy Prep - Liquid

## 2025-07-28 ENCOUNTER — TELEPHONE (OUTPATIENT)
Dept: PAIN MEDICINE | Facility: CLINIC | Age: 70
End: 2025-07-28
Payer: MEDICARE

## 2025-07-29 ENCOUNTER — TELEPHONE (OUTPATIENT)
Dept: PODIATRY | Facility: CLINIC | Age: 70
End: 2025-07-29
Payer: MEDICARE

## 2025-07-29 ENCOUNTER — PATIENT MESSAGE (OUTPATIENT)
Dept: INTERNAL MEDICINE | Facility: CLINIC | Age: 70
End: 2025-07-29
Payer: MEDICARE

## 2025-07-29 NOTE — TELEPHONE ENCOUNTER
Chief Complaint   Patient presents with    Weight Management     The patient (or guardian, if applicable) and other individuals in attendance with the patient were advised that Artificial Intelligence will be utilized during this visit to record, process the conversation to generate a clinical note, and support improvement of the AI technology. The patient (or guardian, if applicable) and other individuals in attendance at the appointment consented to the use of AI, including the recording.                    Weight Management      History of Present Illness  The patient presents for weight management.    She has been actively managing her weight since 04/2025, incorporating protein shakes, water, and small, healthier portions into her diet. She has eliminated junk food and fast food from her meals. Despite these efforts, she notices that consuming more than one small portion meal a day leads to weight gain. She has successfully lost weight, dropping from 199 pounds to 170 pounds. 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 On her days off, she tries to stay active by leaving the house and doing activities such as going to the park with her daughter and grandson. She does not experience cravings and typically consumes one meal a day, supplemented with raw vegetables, fruits, and protein. She avoids carbohydrates in her diet.    She has discontinued all medications, including Topamax, due to a personal decision against medication use. She occasionally experiences brief emotional episodes. She continues to take vitamin D supplements weekly.    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 I informed pt her arrival time is 12 pm per Radha. Pt verbalized understanding and confirmed procedure time.

## 2025-07-30 ENCOUNTER — OFFICE VISIT (OUTPATIENT)
Dept: PODIATRY | Facility: CLINIC | Age: 70
End: 2025-07-30
Payer: MEDICARE

## 2025-07-30 ENCOUNTER — E-VISIT (OUTPATIENT)
Dept: INTERNAL MEDICINE | Facility: CLINIC | Age: 70
End: 2025-07-30
Payer: MEDICARE

## 2025-07-30 VITALS
WEIGHT: 100.06 LBS | DIASTOLIC BLOOD PRESSURE: 66 MMHG | BODY MASS INDEX: 18.89 KG/M2 | HEIGHT: 61 IN | HEART RATE: 83 BPM | SYSTOLIC BLOOD PRESSURE: 147 MMHG

## 2025-07-30 DIAGNOSIS — R06.83 SNORES: Primary | ICD-10-CM

## 2025-07-30 DIAGNOSIS — R29.818 SUSPECTED SLEEP APNEA: ICD-10-CM

## 2025-07-30 DIAGNOSIS — R53.83 FATIGUE, UNSPECIFIED TYPE: ICD-10-CM

## 2025-07-30 DIAGNOSIS — G47.19 OTHER HYPERSOMNIA: ICD-10-CM

## 2025-07-30 DIAGNOSIS — L60.0 INGROWN NAIL: Primary | ICD-10-CM

## 2025-07-30 PROCEDURE — 1101F PT FALLS ASSESS-DOCD LE1/YR: CPT | Mod: CPTII,S$GLB,, | Performed by: STUDENT IN AN ORGANIZED HEALTH CARE EDUCATION/TRAINING PROGRAM

## 2025-07-30 PROCEDURE — 3008F BODY MASS INDEX DOCD: CPT | Mod: CPTII,S$GLB,, | Performed by: STUDENT IN AN ORGANIZED HEALTH CARE EDUCATION/TRAINING PROGRAM

## 2025-07-30 PROCEDURE — 99999 PR PBB SHADOW E&M-EST. PATIENT-LVL IV: CPT | Mod: PBBFAC,,, | Performed by: STUDENT IN AN ORGANIZED HEALTH CARE EDUCATION/TRAINING PROGRAM

## 2025-07-30 PROCEDURE — 1159F MED LIST DOCD IN RCRD: CPT | Mod: CPTII,S$GLB,, | Performed by: STUDENT IN AN ORGANIZED HEALTH CARE EDUCATION/TRAINING PROGRAM

## 2025-07-30 PROCEDURE — 3078F DIAST BP <80 MM HG: CPT | Mod: CPTII,S$GLB,, | Performed by: STUDENT IN AN ORGANIZED HEALTH CARE EDUCATION/TRAINING PROGRAM

## 2025-07-30 PROCEDURE — 1125F AMNT PAIN NOTED PAIN PRSNT: CPT | Mod: CPTII,S$GLB,, | Performed by: STUDENT IN AN ORGANIZED HEALTH CARE EDUCATION/TRAINING PROGRAM

## 2025-07-30 PROCEDURE — 4010F ACE/ARB THERAPY RXD/TAKEN: CPT | Mod: CPTII,S$GLB,, | Performed by: STUDENT IN AN ORGANIZED HEALTH CARE EDUCATION/TRAINING PROGRAM

## 2025-07-30 PROCEDURE — 99213 OFFICE O/P EST LOW 20 MIN: CPT | Mod: S$GLB,,, | Performed by: STUDENT IN AN ORGANIZED HEALTH CARE EDUCATION/TRAINING PROGRAM

## 2025-07-30 PROCEDURE — 3077F SYST BP >= 140 MM HG: CPT | Mod: CPTII,S$GLB,, | Performed by: STUDENT IN AN ORGANIZED HEALTH CARE EDUCATION/TRAINING PROGRAM

## 2025-07-30 PROCEDURE — 3288F FALL RISK ASSESSMENT DOCD: CPT | Mod: CPTII,S$GLB,, | Performed by: STUDENT IN AN ORGANIZED HEALTH CARE EDUCATION/TRAINING PROGRAM

## 2025-07-30 NOTE — PROGRESS NOTES
Doralis pedis pulses 3+, bilaterally. Posterior tibial pulses palpated, 1+ bilaterally. Skin warm, dry. Routine nail care performed, tolerated well. States she will call office to schedule next appointment.

## 2025-07-30 NOTE — PROGRESS NOTES
Subjective:     Patient ID: Bhavna Landry is a 70 y.o. female.    Chief Complaint: Ingrown Toenail (Bilateral hallux bilateral borders)    Bhavna is a 70 y.o. female who presents to the clinic complaining of painful ingrown toenail on both feet, great toes. She relates toenails are thick due to psoriasis.     Review of Systems   Constitutional: Negative for chills, decreased appetite, diaphoresis and fever.   HENT:  Negative for congestion and hearing loss.    Cardiovascular:  Positive for leg swelling. Negative for chest pain, claudication and syncope.   Respiratory:  Negative for cough and shortness of breath.    Skin:  Positive for dry skin and nail changes. Negative for color change, flushing, itching, poor wound healing and rash.   Musculoskeletal:  Positive for arthritis, back pain and joint pain.   Gastrointestinal:  Negative for nausea and vomiting.   Neurological:  Positive for numbness. Negative for focal weakness, paresthesias and weakness.   Psychiatric/Behavioral:  Negative for altered mental status. The patient is not nervous/anxious.         Objective:     Physical Exam  Constitutional:       General: She is not in acute distress.     Appearance: She is well-developed. She is not diaphoretic.   Cardiovascular:      Pulses:           Dorsalis pedis pulses are 2+ on the right side and 2+ on the left side.        Posterior tibial pulses are 1+ on the right side and 1+ on the left side.      Comments: Dorsalis pedis and posterior tibial pulses are within normal limits. Skin temperature is within normal limits. Toes are cool to touch and feet are warm proximally.  Hair growth is diminished. Skin is mildly atrophic and with mild hyperpigmentation. Mild edema noted, bilaterally. Telangiectasias bilaterally.  Musculoskeletal:         General: No tenderness.      Comments: Adequate joint range of motion without pain, limitation, nor crepitation to bilateral feet and ankle joints. Muscle strength is 5/5 in all  "groups bilaterally.       Feet:      Right foot:      Protective Sensation: 10 sites tested.  9 sites sensed.      Left foot:      Protective Sensation: 10 sites tested.  9 sites sensed.   Lymphadenopathy:      Comments: Negative lymphangitic streaking    Skin:     General: Skin is warm and dry.      Findings: No lesion.      Comments: Skin is warm and dry, no acute signs of infection noted. No open wounds, macerations or hyperkeratotic lesions, bilaterally.     Toenails are mildly elongated and thickened     Neurological:      Mental Status: She is alert and oriented to person, place, and time.      Sensory: Sensory deficit present.      Motor: No abnormal muscle tone.      Comments: Light touch within normal limits.    Psychiatric:         Behavior: Behavior normal.         Thought Content: Thought content normal.         Judgment: Judgment normal.           Assessment:      Encounter Diagnosis   Name Primary?    Ingrown nail Yes     Plan:     Bhavna Lorenzo" was seen today for ingrown toenail.    Diagnoses and all orders for this visit:    Ingrown nail      I counseled the patient on her conditions, their implications and medical management.  Utilizing sterile toenail clippers I aggressively trimmed  the offending above mentioned  nail border approximately 3 mm from its edge and carried the nail plate incision down at an angle in order to wedge out the offending cryptotic portion of the nail plate. The offending border was then removed in toto. No blood was drawn. Patient tolerated the procedure well and related significant relief. Site dressed with antibiotic ointment and bandaid, to change same until symptoms resolve.   Toenails debrided 1-10, using sterile nail nippers, as a courtesy, without incident. Patient tolerated well. Assisted by Lili Blanco RN  Plan for procedure on follow up visit        "

## 2025-07-31 ENCOUNTER — TELEPHONE (OUTPATIENT)
Dept: PODIATRY | Facility: CLINIC | Age: 70
End: 2025-07-31
Payer: MEDICARE

## 2025-07-31 NOTE — TELEPHONE ENCOUNTER
Called patient to return call, patient stated she called the wrong number. Patient was told to reach out if she ever needed anything.  Copied from CRM #[id number]. Topic: General Inquiry - Patient Advice  >> Jul 31, 2025 11:10 AM Irena wrote:  Type:requesting a call/ cancel procedure     Who Called: pt   Would the patient rather a call back or a response via [facility]? Call   Best Call Back Number: [phone] (M)  Additional Information: regarding procedure

## 2025-07-31 NOTE — TELEPHONE ENCOUNTER
Copied from CRM #[id number]. Topic: General Inquiry - Patient Advice  >> Jul 31, 2025  9:00 AM Annabelle wrote:  Type:  Call    Who Called:pt  Does the patient know what this is regarding?:Procedure  Would the patient rather a call back or a response via [facility]? call  Best Call Back Number:[phone]  Additional Information:

## 2025-07-31 NOTE — TELEPHONE ENCOUNTER
Called pt back and she told me we have to call  her insurance for approval of her ingrown toenail procedure. Try to explain to her that we are clinical personal and we aren't doing that and we have a offfice who can help her with any questions she has regarding the cost of the procedure. Patient think she has to do a surgery. Forward the phone number from [employer] to her and ask her to call this office and ask them all her questions.

## 2025-08-01 NOTE — PROGRESS NOTES
Patient ID: Bhavna Landry is a 70 y.o. female.        E-Visit Time Tracking:             Chief Complaint: Insomnia (Entered automatically based on patient selection in [employer].)      The patient initiated a request through [employer] on 7/30/2025 for evaluation and management with a chief complaint of Insomnia (Entered automatically based on patient selection in [employer].)     I evaluated the questionnaire submission on 08/01/2025.    [facility] Peq Evisit Insomnia    7/31/2025  9:23 AM CDT - Filed by Patient   Do you agree to participate in an E-Visit? Yes   If you have any of the following symptoms, please go to the nearest emergency room or call 911. I acknowledge   Choose the state of your primary residence [place]   What is the main issue you would like addressed today? sleep apnea   In the past two weeks, how would you rate your difficulty with falling asleep? (None, Mild, Moderate, Severe, Very Severe) None   In the past two weeks, how would you rate your difficulty with staying asleep? (None, Mild, Moderate, Severe, Very Severe) None   How often do you wake up early? (Every day, Most days, Some days, Rarely, Never) Some days   How many days a week do you have a problem with your sleep? (0-1, 2, 3, 4, 5-7) 0-1   How would you rate the quality of your sleep? (Very good, Good, Average, Poor, Very Poor) Average   Has anyone noticed you stop breathing during sleep? Not sure   Do you have any of the following symptoms? (Anxiety, Depression, Fatigue, Mind races, Nightmares, Pain waking you, Restless legs, Snoring, Waking up to urinate, Waking up gasping, Other, None) Fatigue;  Snoring   How long have you had insomnia? (Less than a month, 1-2 months, 3-6 months, 7-12 months, More than a year) Less than a month   Before your insomnia problem started, did you have any of the following? (A loss or traumatic event, Anxiety, Depression, Loss of a loved one, More or worse stress, New medication, New work time, Starting or ending  a relationship, Other, None) None   I would like to address: (Medication for Insomnia, Other concerns related to Insomnia) Other concerns related to Insomnia   Please describe your symptoms. No insomnia.  Sleep apnea is my concern.   On a scale of 1-10, where 10 is the worst you can imagine, how severe are your symptoms? (range: 1 - 10) 5   Have you had these symptoms before? Yes   How long have you been having these symptoms? (Just today, For a few days, For a week, For one to four weeks, For more than a month) More than a month   What helps with your symptoms? I don't know   What makes your symptoms feel worse? I don't know   Are these symptoms related to a condition that you currently have? (Yes, No, Not sure) Not sure   When were you last seen for this condition?    Please describe any probable cause for your symptoms. I want to be checked for sleep apnea   Provide any information you feel is important to your history not asked above I do not have insomnia.  I want to discuss sleep apnea.   Please attach any relevant images or files    Are you able to take your vitals? No         No diagnosis found.     No orders of the defined types were placed in this encounter.       History and complains suspicious for abbey.   Will request  home sleep study.    No follow-ups on file.

## 2025-08-04 ENCOUNTER — TELEPHONE (OUTPATIENT)
Dept: CARDIOLOGY | Facility: CLINIC | Age: 70
End: 2025-08-04
Payer: MEDICARE

## 2025-08-04 NOTE — TELEPHONE ENCOUNTER
Spoke with pt to schedule an appt with Dr. Goel. Informed pt Dr. Goel is booked until January 2026. Offered pt an appt with NP Dae Sandoval. Pt agreed. Scheduled pt an appt with NP Nas Sandoval on Thursday, August 28th.       Copied from CRM #[id number]. Topic: Appointments - Appointment Access  >> Aug 4, 2025  3:35 PM Kaleb wrote:  Type:  Sooner Appointment Request    Patient is requesting a sooner appointment.  Patient declined first available appointment listed as well as another facility and provider .  Patient will not accept being placed on the waitlist and is requesting a message be sent to doctor.    Name of Caller:  patient     When is the first available appointment?  Patient will need to be scheduled in October     Symptoms:  annual     Would the patient rather a call back or a response via [facility]?  Call     Best Call Back Number:  [phone]     Additional Information: please don't scheduled on Mon or Wednesday ; On Tuesday or Thursday ant time faer 10 am ; anytime on Friday ; please advise

## 2025-08-05 ENCOUNTER — TELEPHONE (OUTPATIENT)
Dept: GASTROENTEROLOGY | Facility: CLINIC | Age: 70
End: 2025-08-05
Payer: MEDICARE

## 2025-08-05 NOTE — TELEPHONE ENCOUNTER
Copied from CRM #[id number]. Topic: General Inquiry - Patient Advice  >> Aug 5, 2025  8:29 AM Margaret wrote:  Type:  Pt advice     Who Called: Pt   Does the patient know what this is regarding?: pt would like to know when she is doing her upper GI. Will it be same time as colonoscopy?   Would the patient rather a call back or a response via [facility]? Call / Portal   Best Call Back Number: [phone]   Additional Information:      Spoke with patient and informed her of procedure date. Prep instruction resent via mail. V/U

## 2025-08-06 ENCOUNTER — PATIENT MESSAGE (OUTPATIENT)
Dept: INTERNAL MEDICINE | Facility: CLINIC | Age: 70
End: 2025-08-06
Payer: MEDICARE

## 2025-08-08 ENCOUNTER — TELEPHONE (OUTPATIENT)
Dept: SLEEP MEDICINE | Facility: OTHER | Age: 70
End: 2025-08-08
Payer: MEDICARE

## 2025-08-08 NOTE — TELEPHONE ENCOUNTER
Left message to schedule the home sleep study,also left my supervisors number incase I'm with patients.

## 2025-08-12 ENCOUNTER — TELEPHONE (OUTPATIENT)
Dept: SLEEP MEDICINE | Facility: OTHER | Age: 70
End: 2025-08-12
Payer: MEDICARE

## 2025-08-12 ENCOUNTER — CLINICAL SUPPORT (OUTPATIENT)
Dept: REHABILITATION | Facility: HOSPITAL | Age: 70
End: 2025-08-12
Attending: STUDENT IN AN ORGANIZED HEALTH CARE EDUCATION/TRAINING PROGRAM
Payer: MEDICARE

## 2025-08-12 DIAGNOSIS — M54.16 LUMBAR RADICULOPATHY: ICD-10-CM

## 2025-08-12 DIAGNOSIS — G89.29 CHRONIC BILATERAL LOW BACK PAIN WITHOUT SCIATICA: Primary | ICD-10-CM

## 2025-08-12 DIAGNOSIS — R29.898 DECREASED STRENGTH OF LOWER EXTREMITY: ICD-10-CM

## 2025-08-12 DIAGNOSIS — M47.816 LUMBAR SPONDYLOSIS: ICD-10-CM

## 2025-08-12 DIAGNOSIS — M54.50 CHRONIC BILATERAL LOW BACK PAIN WITHOUT SCIATICA: Primary | ICD-10-CM

## 2025-08-12 PROCEDURE — 97530 THERAPEUTIC ACTIVITIES: CPT | Mod: PN

## 2025-08-12 PROCEDURE — 97161 PT EVAL LOW COMPLEX 20 MIN: CPT | Mod: PN

## 2025-08-13 ENCOUNTER — OFFICE VISIT (OUTPATIENT)
Dept: INTERNAL MEDICINE | Facility: CLINIC | Age: 70
End: 2025-08-13
Payer: MEDICARE

## 2025-08-13 ENCOUNTER — TELEPHONE (OUTPATIENT)
Dept: PAIN MEDICINE | Facility: CLINIC | Age: 70
End: 2025-08-13
Payer: MEDICARE

## 2025-08-13 VITALS
HEART RATE: 79 BPM | SYSTOLIC BLOOD PRESSURE: 134 MMHG | OXYGEN SATURATION: 97 % | DIASTOLIC BLOOD PRESSURE: 62 MMHG | BODY MASS INDEX: 19.4 KG/M2 | WEIGHT: 102.75 LBS | HEIGHT: 61 IN

## 2025-08-13 DIAGNOSIS — R60.9 EDEMA, UNSPECIFIED TYPE: Primary | ICD-10-CM

## 2025-08-13 DIAGNOSIS — S99.922A FOOT TRAUMA, LEFT, INITIAL ENCOUNTER: ICD-10-CM

## 2025-08-13 DIAGNOSIS — H69.91 EUSTACHIAN TUBE DYSFUNCTION, RIGHT: ICD-10-CM

## 2025-08-13 DIAGNOSIS — I10 ESSENTIAL HYPERTENSION: Chronic | ICD-10-CM

## 2025-08-13 PROBLEM — R29.898 DECREASED STRENGTH OF LOWER EXTREMITY: Status: ACTIVE | Noted: 2025-08-13

## 2025-08-13 LAB
BILIRUB UR QL STRIP.AUTO: NEGATIVE
CLARITY UR: CLEAR
COLOR UR AUTO: COLORLESS
GLUCOSE UR QL STRIP: NEGATIVE
HGB UR QL STRIP: NEGATIVE
KETONES UR QL STRIP: NEGATIVE
LEUKOCYTE ESTERASE UR QL STRIP: NEGATIVE
NITRITE UR QL STRIP: NEGATIVE
PH UR STRIP: 7 [PH]
PROT UR QL STRIP: NEGATIVE
SP GR UR STRIP: 1
UROBILINOGEN UR STRIP-ACNC: NEGATIVE EU/DL

## 2025-08-13 PROCEDURE — 81003 URINALYSIS AUTO W/O SCOPE: CPT | Performed by: INTERNAL MEDICINE

## 2025-08-13 PROCEDURE — 1125F AMNT PAIN NOTED PAIN PRSNT: CPT | Mod: CPTII,S$GLB,, | Performed by: INTERNAL MEDICINE

## 2025-08-13 PROCEDURE — 99214 OFFICE O/P EST MOD 30 MIN: CPT | Mod: S$GLB,,, | Performed by: INTERNAL MEDICINE

## 2025-08-13 PROCEDURE — 1101F PT FALLS ASSESS-DOCD LE1/YR: CPT | Mod: CPTII,S$GLB,, | Performed by: INTERNAL MEDICINE

## 2025-08-13 PROCEDURE — 1160F RVW MEDS BY RX/DR IN RCRD: CPT | Mod: CPTII,S$GLB,, | Performed by: INTERNAL MEDICINE

## 2025-08-13 PROCEDURE — 3008F BODY MASS INDEX DOCD: CPT | Mod: CPTII,S$GLB,, | Performed by: INTERNAL MEDICINE

## 2025-08-13 PROCEDURE — G2211 COMPLEX E/M VISIT ADD ON: HCPCS | Mod: S$GLB,,, | Performed by: INTERNAL MEDICINE

## 2025-08-13 PROCEDURE — 3075F SYST BP GE 130 - 139MM HG: CPT | Mod: CPTII,S$GLB,, | Performed by: INTERNAL MEDICINE

## 2025-08-13 PROCEDURE — 1159F MED LIST DOCD IN RCRD: CPT | Mod: CPTII,S$GLB,, | Performed by: INTERNAL MEDICINE

## 2025-08-13 PROCEDURE — 3078F DIAST BP <80 MM HG: CPT | Mod: CPTII,S$GLB,, | Performed by: INTERNAL MEDICINE

## 2025-08-13 PROCEDURE — 4010F ACE/ARB THERAPY RXD/TAKEN: CPT | Mod: CPTII,S$GLB,, | Performed by: INTERNAL MEDICINE

## 2025-08-13 PROCEDURE — 3288F FALL RISK ASSESSMENT DOCD: CPT | Mod: CPTII,S$GLB,, | Performed by: INTERNAL MEDICINE

## 2025-08-13 PROCEDURE — 99999 PR PBB SHADOW E&M-EST. PATIENT-LVL III: CPT | Mod: PBBFAC,,, | Performed by: INTERNAL MEDICINE

## 2025-08-13 RX ORDER — AMLODIPINE BESYLATE 5 MG/1
5 TABLET ORAL DAILY
Qty: 90 TABLET | Refills: 3 | Status: SHIPPED | OUTPATIENT
Start: 2025-08-13

## 2025-08-13 RX ORDER — VALSARTAN 320 MG/1
320 TABLET ORAL DAILY
Qty: 90 TABLET | Refills: 3 | Status: SHIPPED | OUTPATIENT
Start: 2025-08-13

## 2025-08-14 ENCOUNTER — HOSPITAL ENCOUNTER (OUTPATIENT)
Dept: SLEEP MEDICINE | Facility: HOSPITAL | Age: 70
Discharge: HOME | End: 2025-08-14
Attending: PSYCHIATRY & NEUROLOGY
Payer: MEDICARE

## 2025-08-14 ENCOUNTER — RESULTS FOLLOW-UP (OUTPATIENT)
Dept: INTERNAL MEDICINE | Facility: CLINIC | Age: 70
End: 2025-08-14
Payer: MEDICARE

## 2025-08-14 DIAGNOSIS — R29.818 SUSPECTED SLEEP APNEA: ICD-10-CM

## 2025-08-14 DIAGNOSIS — G47.19 OTHER HYPERSOMNIA: ICD-10-CM

## 2025-08-14 DIAGNOSIS — R06.83 SNORES: ICD-10-CM

## 2025-08-14 DIAGNOSIS — R53.83 FATIGUE, UNSPECIFIED TYPE: ICD-10-CM

## 2025-08-14 LAB — HOLD SPECIMEN: NORMAL

## 2025-08-15 ENCOUNTER — HOSPITAL ENCOUNTER (OUTPATIENT)
Dept: RADIOLOGY | Facility: HOSPITAL | Age: 70
Discharge: HOME | End: 2025-08-15
Attending: INTERNAL MEDICINE
Payer: MEDICARE

## 2025-08-15 DIAGNOSIS — S99.922A FOOT TRAUMA, LEFT, INITIAL ENCOUNTER: ICD-10-CM

## 2025-08-15 PROCEDURE — 73630 X-RAY EXAM OF FOOT: CPT | Mod: 26,LT,, | Performed by: RADIOLOGY

## 2025-08-15 PROCEDURE — 73630 X-RAY EXAM OF FOOT: CPT | Mod: TC,LT

## 2025-08-18 ENCOUNTER — TELEPHONE (OUTPATIENT)
Dept: INTERNAL MEDICINE | Facility: CLINIC | Age: 70
End: 2025-08-18
Payer: MEDICARE

## 2025-08-18 ENCOUNTER — PATIENT MESSAGE (OUTPATIENT)
Dept: PAIN MEDICINE | Facility: CLINIC | Age: 70
End: 2025-08-18
Payer: MEDICARE

## 2025-08-18 ENCOUNTER — PATIENT MESSAGE (OUTPATIENT)
Dept: DERMATOLOGY | Facility: CLINIC | Age: 70
End: 2025-08-18
Payer: MEDICARE

## 2025-08-19 PROBLEM — G47.33 OSA (OBSTRUCTIVE SLEEP APNEA): Status: ACTIVE | Noted: 2025-08-19

## 2025-08-20 ENCOUNTER — HOSPITAL ENCOUNTER (OUTPATIENT)
Facility: HOSPITAL | Age: 70
Discharge: HOME | End: 2025-08-20
Attending: STUDENT IN AN ORGANIZED HEALTH CARE EDUCATION/TRAINING PROGRAM | Admitting: STUDENT IN AN ORGANIZED HEALTH CARE EDUCATION/TRAINING PROGRAM
Payer: MEDICARE

## 2025-08-20 ENCOUNTER — PATIENT MESSAGE (OUTPATIENT)
Dept: PAIN MEDICINE | Facility: HOSPITAL | Age: 70
End: 2025-08-20
Payer: MEDICARE

## 2025-08-20 VITALS
BODY MASS INDEX: 19.07 KG/M2 | OXYGEN SATURATION: 93 % | RESPIRATION RATE: 14 BRPM | TEMPERATURE: 98 F | HEART RATE: 67 BPM | DIASTOLIC BLOOD PRESSURE: 56 MMHG | HEIGHT: 61 IN | WEIGHT: 101 LBS | SYSTOLIC BLOOD PRESSURE: 140 MMHG

## 2025-08-20 DIAGNOSIS — M47.816 LUMBAR SPONDYLOSIS: Primary | ICD-10-CM

## 2025-08-20 DIAGNOSIS — G89.29 CHRONIC PAIN: ICD-10-CM

## 2025-08-20 PROCEDURE — 63600175 PHARM REV CODE 636 W HCPCS: Performed by: STUDENT IN AN ORGANIZED HEALTH CARE EDUCATION/TRAINING PROGRAM

## 2025-08-20 PROCEDURE — 25000003 PHARM REV CODE 250: Performed by: STUDENT IN AN ORGANIZED HEALTH CARE EDUCATION/TRAINING PROGRAM

## 2025-08-20 PROCEDURE — 64494 INJ PARAVERT F JNT L/S 2 LEV: CPT | Mod: 50,KX,, | Performed by: STUDENT IN AN ORGANIZED HEALTH CARE EDUCATION/TRAINING PROGRAM

## 2025-08-20 PROCEDURE — 64493 INJ PARAVERT F JNT L/S 1 LEV: CPT | Mod: 50 | Performed by: STUDENT IN AN ORGANIZED HEALTH CARE EDUCATION/TRAINING PROGRAM

## 2025-08-20 PROCEDURE — 64493 INJ PARAVERT F JNT L/S 1 LEV: CPT | Mod: 50,KX,, | Performed by: STUDENT IN AN ORGANIZED HEALTH CARE EDUCATION/TRAINING PROGRAM

## 2025-08-20 PROCEDURE — 64494 INJ PARAVERT F JNT L/S 2 LEV: CPT | Mod: 50 | Performed by: STUDENT IN AN ORGANIZED HEALTH CARE EDUCATION/TRAINING PROGRAM

## 2025-08-20 RX ORDER — LIDOCAINE HYDROCHLORIDE 20 MG/ML
INJECTION, SOLUTION EPIDURAL; INFILTRATION; INTRACAUDAL; PERINEURAL
Status: DISCONTINUED | OUTPATIENT
Start: 2025-08-20 | End: 2025-08-20 | Stop reason: HOSPADM

## 2025-08-20 RX ORDER — ALPRAZOLAM 0.5 MG/1
0.5 TABLET, ORALLY DISINTEGRATING ORAL ONCE AS NEEDED
Status: COMPLETED | OUTPATIENT
Start: 2025-08-20 | End: 2025-08-20

## 2025-08-20 RX ORDER — BUPIVACAINE HYDROCHLORIDE 2.5 MG/ML
INJECTION, SOLUTION EPIDURAL; INFILTRATION; INTRACAUDAL; PERINEURAL
Status: DISCONTINUED | OUTPATIENT
Start: 2025-08-20 | End: 2025-08-20 | Stop reason: HOSPADM

## 2025-08-20 RX ADMIN — ALPRAZOLAM 0.5 MG: 0.5 TABLET, ORALLY DISINTEGRATING ORAL at 01:08

## 2025-08-21 ENCOUNTER — TELEPHONE (OUTPATIENT)
Dept: PAIN MEDICINE | Facility: CLINIC | Age: 70
End: 2025-08-21
Payer: MEDICARE

## 2025-08-21 ENCOUNTER — TELEPHONE (OUTPATIENT)
Dept: GASTROENTEROLOGY | Facility: CLINIC | Age: 70
End: 2025-08-21
Payer: MEDICARE

## 2025-08-21 DIAGNOSIS — M47.816 LUMBAR SPONDYLOSIS: Primary | ICD-10-CM

## 2025-08-22 ENCOUNTER — INFUSION (OUTPATIENT)
Dept: INFECTIOUS DISEASES | Facility: HOSPITAL | Age: 70
End: 2025-08-22
Payer: MEDICARE

## 2025-08-22 VITALS
SYSTOLIC BLOOD PRESSURE: 158 MMHG | HEART RATE: 62 BPM | WEIGHT: 101.44 LBS | BODY MASS INDEX: 19.15 KG/M2 | OXYGEN SATURATION: 97 % | RESPIRATION RATE: 16 BRPM | HEIGHT: 61 IN | DIASTOLIC BLOOD PRESSURE: 67 MMHG | TEMPERATURE: 98 F

## 2025-08-22 DIAGNOSIS — D83.9 COMMON VARIABLE IMMUNODEFICIENCY: Primary | ICD-10-CM

## 2025-08-22 PROCEDURE — 96366 THER/PROPH/DIAG IV INF ADDON: CPT

## 2025-08-22 PROCEDURE — 25000003 PHARM REV CODE 250: Performed by: STUDENT IN AN ORGANIZED HEALTH CARE EDUCATION/TRAINING PROGRAM

## 2025-08-22 PROCEDURE — 96365 THER/PROPH/DIAG IV INF INIT: CPT

## 2025-08-22 PROCEDURE — 63600175 PHARM REV CODE 636 W HCPCS: Mod: JZ,TB | Performed by: STUDENT IN AN ORGANIZED HEALTH CARE EDUCATION/TRAINING PROGRAM

## 2025-08-22 RX ORDER — SODIUM CHLORIDE 0.9 % (FLUSH) 0.9 %
10 SYRINGE (ML) INJECTION
OUTPATIENT
Start: 2025-08-22

## 2025-08-22 RX ORDER — DIPHENHYDRAMINE HYDROCHLORIDE 50 MG/ML
25 INJECTION, SOLUTION INTRAMUSCULAR; INTRAVENOUS
Status: DISCONTINUED | OUTPATIENT
Start: 2025-08-22 | End: 2025-08-22 | Stop reason: HOSPADM

## 2025-08-22 RX ORDER — ACETAMINOPHEN 500 MG
500 TABLET ORAL
OUTPATIENT
Start: 2025-08-22

## 2025-08-22 RX ORDER — DIPHENHYDRAMINE HCL 25 MG
25 CAPSULE ORAL
Status: DISCONTINUED | OUTPATIENT
Start: 2025-08-22 | End: 2025-08-22 | Stop reason: HOSPADM

## 2025-08-22 RX ORDER — DIPHENHYDRAMINE HYDROCHLORIDE 50 MG/ML
25 INJECTION, SOLUTION INTRAMUSCULAR; INTRAVENOUS
OUTPATIENT
Start: 2025-08-22

## 2025-08-22 RX ORDER — HEPARIN 100 UNIT/ML
500 SYRINGE INTRAVENOUS
Status: DISCONTINUED | OUTPATIENT
Start: 2025-08-22 | End: 2025-08-22 | Stop reason: HOSPADM

## 2025-08-22 RX ORDER — EPINEPHRINE 0.3 MG/.3ML
0.3 INJECTION SUBCUTANEOUS ONCE AS NEEDED
Status: DISCONTINUED | OUTPATIENT
Start: 2025-08-22 | End: 2025-08-22 | Stop reason: HOSPADM

## 2025-08-22 RX ORDER — DIPHENHYDRAMINE HCL 25 MG
25 CAPSULE ORAL
OUTPATIENT
Start: 2025-08-22

## 2025-08-22 RX ORDER — SODIUM CHLORIDE 0.9 % (FLUSH) 0.9 %
10 SYRINGE (ML) INJECTION
Status: DISCONTINUED | OUTPATIENT
Start: 2025-08-22 | End: 2025-08-22 | Stop reason: HOSPADM

## 2025-08-22 RX ORDER — ACETAMINOPHEN 500 MG
500 TABLET ORAL
Status: DISCONTINUED | OUTPATIENT
Start: 2025-08-22 | End: 2025-08-22 | Stop reason: HOSPADM

## 2025-08-22 RX ORDER — HEPARIN 100 UNIT/ML
500 SYRINGE INTRAVENOUS
OUTPATIENT
Start: 2025-08-22

## 2025-08-22 RX ORDER — EPINEPHRINE 0.3 MG/.3ML
0.3 INJECTION SUBCUTANEOUS ONCE AS NEEDED
OUTPATIENT
Start: 2025-08-22 | End: 2037-01-18

## 2025-08-22 RX ADMIN — SODIUM CHLORIDE 250 ML: 9 INJECTION, SOLUTION INTRAVENOUS at 11:08

## 2025-08-22 RX ADMIN — HUMAN IMMUNOGLOBULIN G 25 G: 20 LIQUID INTRAVENOUS at 11:08

## 2025-08-26 ENCOUNTER — TELEPHONE (OUTPATIENT)
Dept: INTERNAL MEDICINE | Facility: CLINIC | Age: 70
End: 2025-08-26
Payer: MEDICARE

## 2025-08-26 ENCOUNTER — DOCUMENTATION ONLY (OUTPATIENT)
Dept: ALLERGY | Facility: CLINIC | Age: 70
End: 2025-08-26
Payer: MEDICARE

## 2025-08-26 ENCOUNTER — TELEPHONE (OUTPATIENT)
Dept: ALLERGY | Facility: CLINIC | Age: 70
End: 2025-08-26
Payer: MEDICARE

## 2025-08-26 ENCOUNTER — CLINICAL SUPPORT (OUTPATIENT)
Dept: REHABILITATION | Facility: HOSPITAL | Age: 70
End: 2025-08-26
Payer: MEDICARE

## 2025-08-26 ENCOUNTER — TELEPHONE (OUTPATIENT)
Dept: PAIN MEDICINE | Facility: CLINIC | Age: 70
End: 2025-08-26
Payer: MEDICARE

## 2025-08-26 DIAGNOSIS — R29.898 DECREASED STRENGTH OF LOWER EXTREMITY: ICD-10-CM

## 2025-08-26 DIAGNOSIS — G89.29 CHRONIC BILATERAL LOW BACK PAIN WITHOUT SCIATICA: Primary | ICD-10-CM

## 2025-08-26 DIAGNOSIS — G89.4 CHRONIC PAIN SYNDROME: ICD-10-CM

## 2025-08-26 DIAGNOSIS — M54.50 CHRONIC BILATERAL LOW BACK PAIN WITHOUT SCIATICA: Primary | ICD-10-CM

## 2025-08-26 PROCEDURE — 97110 THERAPEUTIC EXERCISES: CPT | Mod: PN,CQ

## 2025-08-26 PROCEDURE — 97112 NEUROMUSCULAR REEDUCATION: CPT | Mod: PN,CQ

## 2025-08-27 ENCOUNTER — OFFICE VISIT (OUTPATIENT)
Facility: CLINIC | Age: 70
End: 2025-08-27
Attending: PSYCHIATRY & NEUROLOGY
Payer: MEDICARE

## 2025-08-27 ENCOUNTER — TELEPHONE (OUTPATIENT)
Dept: ALLERGY | Facility: CLINIC | Age: 70
End: 2025-08-27
Payer: MEDICARE

## 2025-08-27 DIAGNOSIS — G47.00 INSOMNIA, UNSPECIFIED TYPE: Primary | ICD-10-CM

## 2025-08-27 PROCEDURE — 4010F ACE/ARB THERAPY RXD/TAKEN: CPT | Mod: CPTII,95,, | Performed by: PSYCHIATRY & NEUROLOGY

## 2025-08-27 PROCEDURE — 98002 SYNCH AUDIO-VIDEO NEW MOD 45: CPT | Mod: 95,,, | Performed by: PSYCHIATRY & NEUROLOGY

## 2025-08-27 RX ORDER — TRAZODONE HYDROCHLORIDE 50 MG/1
TABLET ORAL
Qty: 30 TABLET | Refills: 5 | Status: SHIPPED | OUTPATIENT
Start: 2025-08-27

## 2025-09-02 ENCOUNTER — HOSPITAL ENCOUNTER (OUTPATIENT)
Dept: RADIOLOGY | Facility: HOSPITAL | Age: 70
Discharge: HOME | End: 2025-09-02
Attending: STUDENT IN AN ORGANIZED HEALTH CARE EDUCATION/TRAINING PROGRAM
Payer: MEDICARE

## 2025-09-02 DIAGNOSIS — M25.559 HIP PAIN, UNSPECIFIED LATERALITY: ICD-10-CM
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
## 2025-09-02 PROCEDURE — 73521 X-RAY EXAM HIPS BI 2 VIEWS: CPT | Mod: TC

## 2025-09-03 ENCOUNTER — TELEPHONE (OUTPATIENT)
Dept: PAIN MEDICINE | Facility: CLINIC | Age: 70
End: 2025-09-03
Payer: MEDICARE

## 2025-09-03 DIAGNOSIS — U07.1 COVID-19 VIRUS DETECTED: ICD-10-CM

## (undated) DEVICE — SOL BETADINE 5%

## (undated) DEVICE — ELECTRODE REM PLYHSV RETURN 9

## (undated) DEVICE — BANDAGE ADHESIVE

## (undated) DEVICE — SPONGE LAP 18X18 PREWASHED

## (undated) DEVICE — GLOVE BIOGEL SKINSENSE PI 6.5

## (undated) DEVICE — ELECTRODE MEGADYNE RETURN DUAL

## (undated) DEVICE — GOWN SURGICAL X-LARGE

## (undated) DEVICE — GOWN B1 X-LG X-LONG

## (undated) DEVICE — SPONGE GAUZE 16PLY 4X4

## (undated) DEVICE — SOL WATER STRL IRR 1000ML

## (undated) DEVICE — DRESSING LEUKOPLAST FLEX 1X3IN

## (undated) DEVICE — CORD BIPOLAR 12 FOOT

## (undated) DEVICE — APPLICATOR CHLORAPREP ORN 26ML

## (undated) DEVICE — DRAPE INSTR MAGNETIC 10X16IN

## (undated) DEVICE — SKIN MARKER DEVON 160

## (undated) DEVICE — CATH ALL PUR URTHL RR 10FR

## (undated) DEVICE — DRAPE OPHTHALMIC 48X62 FEN

## (undated) DEVICE — Device

## (undated) DEVICE — LOOP VESSEL BLUE MAXI

## (undated) DEVICE — PAD ABD 8X10 STERILE

## (undated) DEVICE — HEMOSTAT SURGICEL 4X8IN

## (undated) DEVICE — CONTAINER SPECIMEN OR STER 4OZ

## (undated) DEVICE — SHIELD COLLAGEN 12HR CORNEAL

## (undated) DEVICE — WAVEGUIDE BRITEFIELD DISP

## (undated) DEVICE — DRAPE T THYROID STERILE

## (undated) DEVICE — DECANTER FLUID TRNSF WHITE 9IN

## (undated) DEVICE — SUT VICRYL 3-0 27 SH

## (undated) DEVICE — SUT PROLENE 6-0 C-1 30IN BL

## (undated) DEVICE — POWDER ARISTA AH 3G

## (undated) DEVICE — GLOVE BIOGEL ECLIPSE SZ 8

## (undated) DEVICE — ELECTRODE BLADE TEFLON 6

## (undated) DEVICE — SEE MEDLINE ITEM 157131

## (undated) DEVICE — NDL FLTR 5MCRN BLNT TIP 18GX1

## (undated) DEVICE — SUT 2/0 30IN SILK BLK BRAI

## (undated) DEVICE — INSERTS STEALTH FIBRA SIZE 1.

## (undated) DEVICE — TOWEL OR DISP STRL BLUE 4/PK

## (undated) DEVICE — KIT GREY EYE

## (undated) DEVICE — BANDAGE GAUZE 6PLY FLUFF 2X3

## (undated) DEVICE — SUT 3-0 12-18IN SILK

## (undated) DEVICE — SHEILD & GARTERS FOX METAL EYE

## (undated) DEVICE — TRAY PERIPHERAL VASCULAR OMC

## (undated) DEVICE — EVACUATOR WOUND BULB 100CC

## (undated) DEVICE — BLADE SURG BVL ANG COAX 2.4MM

## (undated) DEVICE — LOOP VESSEL BLUE MINI 2/CARD

## (undated) DEVICE — COVER LIGHT HANDLE 80/CA

## (undated) DEVICE — BLADE SCALP OPHTL BEVEL STR

## (undated) DEVICE — BRA CLASSIC COMFORM BLK SZ 36

## (undated) DEVICE — UNDERGLOVE BIOGEL PI SZ 6.5 LF

## (undated) DEVICE — SUT MCRYL PLUS 4-0 PS2 27IN

## (undated) DEVICE — SKINMARKER W/RULER DEVON

## (undated) DEVICE — SHIELD EYE PLASTIC 3100G

## (undated) DEVICE — FORCEP STRAIGHT DISP

## (undated) DEVICE — SUT 2-0 12-18IN SILK

## (undated) DEVICE — ADHESIVE DERMABOND ADVANCED

## (undated) DEVICE — SHIELD EYE CLEAR ACRYLIC UNIV

## (undated) DEVICE — NDL HYPO REG 25G X 1 1/2

## (undated) DEVICE — POSITIONER IV ARMBOARD FOAM

## (undated) DEVICE — DRESSING TRANS 4X4 TEGADERM

## (undated) DEVICE — PAD CURAD NONADH 3X4IN

## (undated) DEVICE — BANDAID GARFIELD

## (undated) DEVICE — EVACUATOR PENCIL SMOKE NEPTUNE

## (undated) DEVICE — SYR B-D DISP CONTROL 10CC100/C

## (undated) DEVICE — ELECTRODE BLADE INSULATED 1 IN

## (undated) DEVICE — APPLIER CLIP LIAGCLIP 9.375IN

## (undated) DEVICE — DRAPE STERI INSTRUMENT 1018

## (undated) DEVICE — SUT 4-0 12-18IN SILK BLACK